# Patient Record
Sex: MALE | Race: WHITE | NOT HISPANIC OR LATINO | Employment: OTHER | URBAN - METROPOLITAN AREA
[De-identification: names, ages, dates, MRNs, and addresses within clinical notes are randomized per-mention and may not be internally consistent; named-entity substitution may affect disease eponyms.]

---

## 2017-01-04 ENCOUNTER — ALLSCRIPTS OFFICE VISIT (OUTPATIENT)
Dept: OTHER | Facility: OTHER | Age: 67
End: 2017-01-04

## 2017-01-25 ENCOUNTER — ALLSCRIPTS OFFICE VISIT (OUTPATIENT)
Dept: OTHER | Facility: OTHER | Age: 67
End: 2017-01-25

## 2017-02-15 ENCOUNTER — LAB (OUTPATIENT)
Dept: LAB | Facility: MEDICAL CENTER | Age: 67
End: 2017-02-15
Payer: COMMERCIAL

## 2017-02-15 DIAGNOSIS — C91.10 CHRONIC LYMPHOCYTIC LEUKEMIA OF B-CELL TYPE NOT HAVING ACHIEVED REMISSION (HCC): ICD-10-CM

## 2017-02-15 DIAGNOSIS — I10 ESSENTIAL (PRIMARY) HYPERTENSION: ICD-10-CM

## 2017-02-15 DIAGNOSIS — Z12.5 ENCOUNTER FOR SCREENING FOR MALIGNANT NEOPLASM OF PROSTATE: ICD-10-CM

## 2017-02-15 LAB
ALBUMIN SERPL BCP-MCNC: 4.1 G/DL (ref 3.5–5)
ALP SERPL-CCNC: 76 U/L (ref 46–116)
ALT SERPL W P-5'-P-CCNC: 28 U/L (ref 12–78)
ANION GAP SERPL CALCULATED.3IONS-SCNC: 8 MMOL/L (ref 4–13)
AST SERPL W P-5'-P-CCNC: 21 U/L (ref 5–45)
BASOPHILS # BLD AUTO: 0.02 THOUSANDS/ΜL (ref 0–0.1)
BASOPHILS NFR BLD AUTO: 0 % (ref 0–1)
BILIRUB SERPL-MCNC: 0.72 MG/DL (ref 0.2–1)
BUN SERPL-MCNC: 9 MG/DL (ref 5–25)
CALCIUM SERPL-MCNC: 8.6 MG/DL (ref 8.3–10.1)
CHLORIDE SERPL-SCNC: 100 MMOL/L (ref 100–108)
CHOLEST SERPL-MCNC: 141 MG/DL (ref 50–200)
CO2 SERPL-SCNC: 28 MMOL/L (ref 21–32)
CREAT SERPL-MCNC: 0.84 MG/DL (ref 0.6–1.3)
EOSINOPHIL # BLD AUTO: 0.14 THOUSAND/ΜL (ref 0–0.61)
EOSINOPHIL NFR BLD AUTO: 1 % (ref 0–6)
ERYTHROCYTE [DISTWIDTH] IN BLOOD BY AUTOMATED COUNT: 17.5 % (ref 11.6–15.1)
GFR SERPL CREATININE-BSD FRML MDRD: >60 ML/MIN/1.73SQ M
GLUCOSE SERPL-MCNC: 109 MG/DL (ref 65–140)
HCT VFR BLD AUTO: 28.7 % (ref 36.5–49.3)
HDLC SERPL-MCNC: 77 MG/DL (ref 40–60)
HGB BLD-MCNC: 9 G/DL (ref 12–17)
LDLC SERPL CALC-MCNC: 56 MG/DL (ref 0–100)
LYMPHOCYTES # BLD AUTO: 14.39 THOUSANDS/ΜL (ref 0.6–4.47)
LYMPHOCYTES NFR BLD AUTO: 84 % (ref 14–44)
MCH RBC QN AUTO: 25 PG (ref 26.8–34.3)
MCHC RBC AUTO-ENTMCNC: 31.4 G/DL (ref 31.4–37.4)
MCV RBC AUTO: 80 FL (ref 82–98)
MONOCYTES # BLD AUTO: 0.41 THOUSAND/ΜL (ref 0.17–1.22)
MONOCYTES NFR BLD AUTO: 2 % (ref 4–12)
NEUTROPHILS # BLD AUTO: 2.3 THOUSANDS/ΜL (ref 1.85–7.62)
NEUTS SEG NFR BLD AUTO: 13 % (ref 43–75)
NRBC BLD AUTO-RTO: 0 /100 WBCS
PLATELET # BLD AUTO: 181 THOUSANDS/UL (ref 149–390)
PMV BLD AUTO: 10.1 FL (ref 8.9–12.7)
POTASSIUM SERPL-SCNC: 4.3 MMOL/L (ref 3.5–5.3)
PROT SERPL-MCNC: 8.3 G/DL (ref 6.4–8.2)
PSA SERPL-MCNC: 0.2 NG/ML (ref 0–4)
RBC # BLD AUTO: 3.6 MILLION/UL (ref 3.88–5.62)
SODIUM SERPL-SCNC: 136 MMOL/L (ref 136–145)
TRIGL SERPL-MCNC: 38 MG/DL
WBC # BLD AUTO: 17.27 THOUSAND/UL (ref 4.31–10.16)

## 2017-02-15 PROCEDURE — 80061 LIPID PANEL: CPT

## 2017-02-15 PROCEDURE — 36415 COLL VENOUS BLD VENIPUNCTURE: CPT

## 2017-02-15 PROCEDURE — G0103 PSA SCREENING: HCPCS

## 2017-02-15 PROCEDURE — 80053 COMPREHEN METABOLIC PANEL: CPT

## 2017-02-15 PROCEDURE — 85025 COMPLETE CBC W/AUTO DIFF WBC: CPT

## 2017-02-16 ENCOUNTER — GENERIC CONVERSION - ENCOUNTER (OUTPATIENT)
Dept: OTHER | Facility: OTHER | Age: 67
End: 2017-02-16

## 2017-02-22 ENCOUNTER — ALLSCRIPTS OFFICE VISIT (OUTPATIENT)
Dept: OTHER | Facility: OTHER | Age: 67
End: 2017-02-22

## 2017-02-22 ENCOUNTER — TRANSCRIBE ORDERS (OUTPATIENT)
Dept: ADMINISTRATIVE | Facility: HOSPITAL | Age: 67
End: 2017-02-22

## 2017-02-22 DIAGNOSIS — R06.00 DYSPNEA, UNSPECIFIED TYPE: Primary | ICD-10-CM

## 2017-02-23 ENCOUNTER — LAB (OUTPATIENT)
Dept: LAB | Facility: MEDICAL CENTER | Age: 67
End: 2017-02-23
Payer: COMMERCIAL

## 2017-02-23 ENCOUNTER — ALLSCRIPTS OFFICE VISIT (OUTPATIENT)
Dept: OTHER | Facility: OTHER | Age: 67
End: 2017-02-23

## 2017-02-23 ENCOUNTER — LAB REQUISITION (OUTPATIENT)
Dept: LAB | Facility: HOSPITAL | Age: 67
End: 2017-02-23
Payer: COMMERCIAL

## 2017-02-23 ENCOUNTER — TRANSCRIBE ORDERS (OUTPATIENT)
Dept: ADMINISTRATIVE | Facility: HOSPITAL | Age: 67
End: 2017-02-23

## 2017-02-23 DIAGNOSIS — D64.9 ANEMIA: ICD-10-CM

## 2017-02-23 LAB
DAT POLY-SP REAG RBC QL: NEGATIVE
FERRITIN SERPL-MCNC: 17 NG/ML (ref 8–388)
FOLATE SERPL-MCNC: 11.2 NG/ML (ref 3.1–17.5)
LDH SERPL-CCNC: 168 U/L (ref 81–234)
RETICS # AUTO: NORMAL 10*3/UL (ref 14356–105094)
RETICS # CALC: 1.57 % (ref 0.37–1.87)

## 2017-02-23 PROCEDURE — 36415 COLL VENOUS BLD VENIPUNCTURE: CPT

## 2017-02-23 PROCEDURE — 86880 COOMBS TEST DIRECT: CPT | Performed by: INTERNAL MEDICINE

## 2017-02-23 PROCEDURE — 85045 AUTOMATED RETICULOCYTE COUNT: CPT

## 2017-02-23 PROCEDURE — 83918 ORGANIC ACIDS TOTAL QUANT: CPT

## 2017-02-23 PROCEDURE — 82746 ASSAY OF FOLIC ACID SERUM: CPT

## 2017-02-23 PROCEDURE — 83615 LACTATE (LD) (LDH) ENZYME: CPT

## 2017-02-23 PROCEDURE — 82728 ASSAY OF FERRITIN: CPT

## 2017-03-01 ENCOUNTER — HOSPITAL ENCOUNTER (OUTPATIENT)
Dept: NON INVASIVE DIAGNOSTICS | Facility: CLINIC | Age: 67
Discharge: HOME | End: 2017-03-01
Payer: COMMERCIAL

## 2017-03-01 ENCOUNTER — GENERIC CONVERSION - ENCOUNTER (OUTPATIENT)
Dept: OTHER | Facility: OTHER | Age: 67
End: 2017-03-01

## 2017-03-01 DIAGNOSIS — R06.00 DYSPNEA, UNSPECIFIED TYPE: ICD-10-CM

## 2017-03-01 LAB — METHYLMALONATE SERPL-SCNC: 184 NMOL/L (ref 0–378)

## 2017-03-01 PROCEDURE — 93350 STRESS TTE ONLY: CPT

## 2017-05-09 RX ORDER — SODIUM CHLORIDE 9 MG/ML
20 INJECTION, SOLUTION INTRAVENOUS CONTINUOUS
Status: DISCONTINUED | OUTPATIENT
Start: 2017-05-10 | End: 2017-05-13 | Stop reason: HOSPADM

## 2017-05-10 ENCOUNTER — HOSPITAL ENCOUNTER (OUTPATIENT)
Dept: INFUSION CENTER | Facility: CLINIC | Age: 67
Discharge: HOME | End: 2017-05-10
Payer: COMMERCIAL

## 2017-05-15 ENCOUNTER — ALLSCRIPTS OFFICE VISIT (OUTPATIENT)
Dept: OTHER | Facility: OTHER | Age: 67
End: 2017-05-15

## 2017-05-16 RX ORDER — SODIUM CHLORIDE 9 MG/ML
20 INJECTION, SOLUTION INTRAVENOUS CONTINUOUS
Status: DISCONTINUED | OUTPATIENT
Start: 2017-05-17 | End: 2017-05-20 | Stop reason: HOSPADM

## 2017-05-17 ENCOUNTER — HOSPITAL ENCOUNTER (OUTPATIENT)
Dept: INFUSION CENTER | Facility: CLINIC | Age: 67
Discharge: HOME | End: 2017-05-17
Payer: COMMERCIAL

## 2017-05-17 PROCEDURE — 96374 THER/PROPH/DIAG INJ IV PUSH: CPT

## 2017-05-17 RX ORDER — OMEPRAZOLE 40 MG/1
40 CAPSULE, DELAYED RELEASE ORAL DAILY
COMMUNITY
End: 2018-08-15 | Stop reason: SDUPTHER

## 2017-05-17 RX ORDER — ASPIRIN 81 MG/1
81 TABLET ORAL DAILY
COMMUNITY
End: 2017-10-02 | Stop reason: HOSPADM

## 2017-05-17 RX ORDER — TAMSULOSIN HYDROCHLORIDE 0.4 MG/1
0.4 CAPSULE ORAL
COMMUNITY
End: 2018-08-15 | Stop reason: SDUPTHER

## 2017-05-17 RX ORDER — OMEGA-3 FATTY ACIDS CAP DELAYED RELEASE 1000 MG 1000 MG
1 CAPSULE DELAYED RELEASE ORAL 2 TIMES DAILY
COMMUNITY
End: 2017-10-02 | Stop reason: HOSPADM

## 2017-05-17 RX ORDER — CARVEDILOL 12.5 MG/1
12.5 TABLET ORAL 2 TIMES DAILY WITH MEALS
COMMUNITY
End: 2018-04-06 | Stop reason: SDUPTHER

## 2017-05-17 RX ORDER — MINOXIDIL 10 MG/1
10 TABLET ORAL DAILY
COMMUNITY
End: 2018-09-25 | Stop reason: SDUPTHER

## 2017-05-17 RX ORDER — SIMVASTATIN 20 MG
20 TABLET ORAL
COMMUNITY
End: 2018-09-25 | Stop reason: SDUPTHER

## 2017-05-17 RX ORDER — LISINOPRIL 40 MG/1
40 TABLET ORAL DAILY
COMMUNITY
End: 2018-04-06 | Stop reason: ALTCHOICE

## 2017-05-17 RX ORDER — AMLODIPINE BESYLATE 5 MG/1
5 TABLET ORAL EVERY EVENING
COMMUNITY
End: 2018-08-15 | Stop reason: SDUPTHER

## 2017-05-17 RX ORDER — SERTRALINE HYDROCHLORIDE 100 MG/1
50 TABLET, FILM COATED ORAL DAILY
COMMUNITY
End: 2018-01-29 | Stop reason: SDUPTHER

## 2017-05-17 RX ADMIN — IRON SUCROSE 200 MG: 20 INJECTION, SOLUTION INTRAVENOUS at 09:29

## 2017-05-17 RX ADMIN — SODIUM CHLORIDE 20 ML/HR: 0.9 INJECTION, SOLUTION INTRAVENOUS at 09:29

## 2017-05-17 NOTE — PLAN OF CARE
Problem: Potential for Falls  Goal: Patient will remain free of falls  INTERVENTIONS:  - Assess patient frequently for physical needs  - Identify cognitive and physical deficits and behaviors that affect risk of falls    - [place] fall precautions as indicated by assessment   - Educate patient/family on patient safety including physical limitations  - Instruct patient to call for assistance with activity based on assessment  - Modify environment to reduce risk of injury  - Consider OT/PT consult to assist with strengthening/mobility   Outcome: Progressing

## 2017-05-19 ENCOUNTER — GENERIC CONVERSION - ENCOUNTER (OUTPATIENT)
Dept: OTHER | Facility: OTHER | Age: 67
End: 2017-05-19

## 2017-05-23 RX ORDER — SODIUM CHLORIDE 9 MG/ML
20 INJECTION, SOLUTION INTRAVENOUS CONTINUOUS
Status: DISCONTINUED | OUTPATIENT
Start: 2017-05-24 | End: 2017-05-27 | Stop reason: HOSPADM

## 2017-05-24 ENCOUNTER — HOSPITAL ENCOUNTER (OUTPATIENT)
Dept: INFUSION CENTER | Facility: CLINIC | Age: 67
Discharge: HOME | End: 2017-05-24
Payer: COMMERCIAL

## 2017-05-24 VITALS
HEART RATE: 54 BPM | RESPIRATION RATE: 18 BRPM | SYSTOLIC BLOOD PRESSURE: 138 MMHG | DIASTOLIC BLOOD PRESSURE: 78 MMHG | TEMPERATURE: 98.1 F

## 2017-05-24 PROCEDURE — 96374 THER/PROPH/DIAG INJ IV PUSH: CPT

## 2017-05-24 RX ADMIN — SODIUM CHLORIDE 20 ML/HR: 0.9 INJECTION, SOLUTION INTRAVENOUS at 09:49

## 2017-05-24 RX ADMIN — IRON SUCROSE 200 MG: 20 INJECTION, SOLUTION INTRAVENOUS at 09:49

## 2017-05-24 NOTE — PLAN OF CARE
Problem: Potential for Falls  Goal: Patient will remain free of falls  INTERVENTIONS:  - Assess patient frequently for physical needs  - Identify cognitive and physical deficits and behaviors that affect risk of falls    - [place] fall precautions as indicated by assessment   - Educate patient/family on patient safety including physical limitations  - Instruct patient to call for assistance with activity based on assessment  - Modify environment to reduce risk of injury  - Consider OT/PT consult to assist with strengthening/mobility   Outcome: Progressing

## 2017-05-30 RX ORDER — SODIUM CHLORIDE 9 MG/ML
20 INJECTION, SOLUTION INTRAVENOUS CONTINUOUS
Status: DISCONTINUED | OUTPATIENT
Start: 2017-05-31 | End: 2017-06-03 | Stop reason: HOSPADM

## 2017-05-31 ENCOUNTER — HOSPITAL ENCOUNTER (OUTPATIENT)
Dept: INFUSION CENTER | Facility: CLINIC | Age: 67
Discharge: HOME | End: 2017-05-31
Payer: COMMERCIAL

## 2017-05-31 VITALS
SYSTOLIC BLOOD PRESSURE: 139 MMHG | RESPIRATION RATE: 18 BRPM | DIASTOLIC BLOOD PRESSURE: 69 MMHG | TEMPERATURE: 97.6 F | HEART RATE: 57 BPM

## 2017-05-31 PROCEDURE — 96374 THER/PROPH/DIAG INJ IV PUSH: CPT

## 2017-05-31 RX ADMIN — IRON SUCROSE 200 MG: 20 INJECTION, SOLUTION INTRAVENOUS at 09:10

## 2017-05-31 RX ADMIN — SODIUM CHLORIDE 20 ML/HR: 0.9 INJECTION, SOLUTION INTRAVENOUS at 09:11

## 2017-05-31 NOTE — PROGRESS NOTES
Pt received IVP venofer dose 3/4 without complications  VSS upon discharge  Pt aware of next appointment  AVS provided

## 2017-05-31 NOTE — PLAN OF CARE
Problem: Potential for Falls  Goal: Patient will remain free of falls  INTERVENTIONS:  - Assess patient frequently for physical needs  - Identify cognitive and physical deficits and behaviors that affect risk of falls    - [place] fall precautions as indicated by assessment   - Educate patient/family on patient safety including physical limitations  - Instruct patient to call for assistance with activity based on assessment  - Modify environment to reduce risk of injury  - Consider OT/PT consult to assist with strengthening/mobility   Outcome: Progressing

## 2017-06-06 RX ORDER — SODIUM CHLORIDE 9 MG/ML
20 INJECTION, SOLUTION INTRAVENOUS CONTINUOUS
Status: DISCONTINUED | OUTPATIENT
Start: 2017-06-07 | End: 2017-06-10 | Stop reason: HOSPADM

## 2017-06-07 ENCOUNTER — HOSPITAL ENCOUNTER (OUTPATIENT)
Dept: INFUSION CENTER | Facility: CLINIC | Age: 67
Discharge: HOME | End: 2017-06-07
Payer: COMMERCIAL

## 2017-06-09 RX ORDER — SODIUM CHLORIDE 9 MG/ML
20 INJECTION, SOLUTION INTRAVENOUS CONTINUOUS
Status: DISCONTINUED | OUTPATIENT
Start: 2017-06-12 | End: 2017-06-15 | Stop reason: HOSPADM

## 2017-06-12 ENCOUNTER — HOSPITAL ENCOUNTER (OUTPATIENT)
Dept: INFUSION CENTER | Facility: CLINIC | Age: 67
Discharge: HOME | End: 2017-06-12
Payer: COMMERCIAL

## 2017-06-12 VITALS
DIASTOLIC BLOOD PRESSURE: 61 MMHG | SYSTOLIC BLOOD PRESSURE: 122 MMHG | HEART RATE: 57 BPM | TEMPERATURE: 98.8 F | RESPIRATION RATE: 20 BRPM

## 2017-06-12 PROCEDURE — 96374 THER/PROPH/DIAG INJ IV PUSH: CPT

## 2017-06-12 RX ADMIN — SODIUM CHLORIDE 20 ML/HR: 0.9 INJECTION, SOLUTION INTRAVENOUS at 08:16

## 2017-06-12 RX ADMIN — IRON SUCROSE 200 MG: 20 INJECTION, SOLUTION INTRAVENOUS at 08:17

## 2017-06-12 NOTE — PLAN OF CARE
Problem: Potential for Falls  Goal: Patient will remain free of falls  INTERVENTIONS:  - Assess patient frequently for physical needs  -  Identify cognitive and physical deficits and behaviors that affect risk of falls    -  [place] fall precautions as indicated by assessment   - Educate patient/family on patient safety including physical limitations  - Instruct patient to call for assistance with activity based on assessment  - Modify environment to reduce risk of injury  - Consider OT/PT consult to assist with strengthening/mobility   Outcome: Progressing

## 2017-07-17 ENCOUNTER — GENERIC CONVERSION - ENCOUNTER (OUTPATIENT)
Dept: OTHER | Facility: OTHER | Age: 67
End: 2017-07-17

## 2017-08-14 DIAGNOSIS — D64.9 ANEMIA: ICD-10-CM

## 2017-08-17 ENCOUNTER — LAB (OUTPATIENT)
Dept: LAB | Facility: CLINIC | Age: 67
End: 2017-08-17
Payer: COMMERCIAL

## 2017-08-17 ENCOUNTER — TRANSCRIBE ORDERS (OUTPATIENT)
Dept: LAB | Facility: CLINIC | Age: 67
End: 2017-08-17

## 2017-08-17 ENCOUNTER — TRANSCRIBE ORDERS (OUTPATIENT)
Dept: ADMINISTRATIVE | Facility: HOSPITAL | Age: 67
End: 2017-08-17

## 2017-08-17 ENCOUNTER — ALLSCRIPTS OFFICE VISIT (OUTPATIENT)
Dept: OTHER | Facility: OTHER | Age: 67
End: 2017-08-17

## 2017-08-17 DIAGNOSIS — D64.9 ANEMIA, UNSPECIFIED: Primary | ICD-10-CM

## 2017-08-17 DIAGNOSIS — I27.20 PHT (PULMONARY HYPERTENSION) (HCC): Primary | ICD-10-CM

## 2017-08-17 DIAGNOSIS — D64.9 ANEMIA: ICD-10-CM

## 2017-08-17 LAB
ALBUMIN SERPL BCP-MCNC: 4.1 G/DL (ref 3.5–5)
ALP SERPL-CCNC: 90 U/L (ref 46–116)
ALT SERPL W P-5'-P-CCNC: 37 U/L (ref 12–78)
ANION GAP SERPL CALCULATED.3IONS-SCNC: 6 MMOL/L (ref 4–13)
ANISOCYTOSIS BLD QL SMEAR: PRESENT
AST SERPL W P-5'-P-CCNC: 35 U/L (ref 5–45)
BASOPHILS # BLD MANUAL: 0 THOUSAND/UL (ref 0–0.1)
BASOPHILS NFR MAR MANUAL: 0 % (ref 0–1)
BILIRUB SERPL-MCNC: 0.45 MG/DL (ref 0.2–1)
BUN SERPL-MCNC: 7 MG/DL (ref 5–25)
CALCIUM SERPL-MCNC: 8.4 MG/DL (ref 8.3–10.1)
CHLORIDE SERPL-SCNC: 97 MMOL/L (ref 100–108)
CO2 SERPL-SCNC: 26 MMOL/L (ref 21–32)
CREAT SERPL-MCNC: 0.71 MG/DL (ref 0.6–1.3)
EOSINOPHIL # BLD MANUAL: 0.23 THOUSAND/UL (ref 0–0.4)
EOSINOPHIL NFR BLD MANUAL: 1 % (ref 0–6)
ERYTHROCYTE [DISTWIDTH] IN BLOOD BY AUTOMATED COUNT: 16.4 % (ref 11.6–15.1)
FERRITIN SERPL-MCNC: 22 NG/ML (ref 8–388)
GFR SERPL CREATININE-BSD FRML MDRD: 99 ML/MIN/1.73SQ M
GLUCOSE P FAST SERPL-MCNC: 96 MG/DL (ref 65–99)
HCT VFR BLD AUTO: 35.1 % (ref 36.5–49.3)
HGB BLD-MCNC: 11.8 G/DL (ref 12–17)
LYMPHOCYTES # BLD AUTO: 18.17 THOUSAND/UL (ref 0.6–4.47)
LYMPHOCYTES # BLD AUTO: 78 % (ref 14–44)
MCH RBC QN AUTO: 28.9 PG (ref 26.8–34.3)
MCHC RBC AUTO-ENTMCNC: 33.6 G/DL (ref 31.4–37.4)
MCV RBC AUTO: 86 FL (ref 82–98)
MONOCYTES # BLD AUTO: 1.4 THOUSAND/UL (ref 0–1.22)
MONOCYTES NFR BLD: 6 % (ref 4–12)
NEUTROPHILS # BLD MANUAL: 2.8 THOUSAND/UL (ref 1.85–7.62)
NEUTS SEG NFR BLD AUTO: 12 % (ref 43–75)
NRBC BLD AUTO-RTO: 0 /100 WBCS
PLATELET # BLD AUTO: 187 THOUSANDS/UL (ref 149–390)
PLATELET BLD QL SMEAR: ADEQUATE
PMV BLD AUTO: 9.9 FL (ref 8.9–12.7)
POTASSIUM SERPL-SCNC: 4.8 MMOL/L (ref 3.5–5.3)
PROT SERPL-MCNC: 8 G/DL (ref 6.4–8.2)
RBC # BLD AUTO: 4.08 MILLION/UL (ref 3.88–5.62)
SMUDGE CELLS BLD QL SMEAR: PRESENT
SODIUM SERPL-SCNC: 129 MMOL/L (ref 136–145)
TOTAL CELLS COUNTED SPEC: 100
VARIANT LYMPHS # BLD AUTO: 3 %
WBC # BLD AUTO: 23.3 THOUSAND/UL (ref 4.31–10.16)

## 2017-08-17 PROCEDURE — 82728 ASSAY OF FERRITIN: CPT

## 2017-08-17 PROCEDURE — 85007 BL SMEAR W/DIFF WBC COUNT: CPT

## 2017-08-17 PROCEDURE — 85027 COMPLETE CBC AUTOMATED: CPT

## 2017-08-17 PROCEDURE — 36415 COLL VENOUS BLD VENIPUNCTURE: CPT

## 2017-08-17 PROCEDURE — 80053 COMPREHEN METABOLIC PANEL: CPT

## 2017-08-24 ENCOUNTER — ALLSCRIPTS OFFICE VISIT (OUTPATIENT)
Dept: OTHER | Facility: OTHER | Age: 67
End: 2017-08-24

## 2017-08-29 ENCOUNTER — HOSPITAL ENCOUNTER (OUTPATIENT)
Dept: INFUSION CENTER | Facility: CLINIC | Age: 67
Discharge: HOME | End: 2017-08-29
Payer: COMMERCIAL

## 2017-08-29 VITALS
DIASTOLIC BLOOD PRESSURE: 80 MMHG | SYSTOLIC BLOOD PRESSURE: 164 MMHG | TEMPERATURE: 97 F | RESPIRATION RATE: 18 BRPM | HEART RATE: 59 BPM

## 2017-08-29 PROCEDURE — 96374 THER/PROPH/DIAG INJ IV PUSH: CPT

## 2017-08-29 RX ORDER — SODIUM CHLORIDE 9 MG/ML
20 INJECTION, SOLUTION INTRAVENOUS CONTINUOUS
Status: DISCONTINUED | OUTPATIENT
Start: 2017-08-29 | End: 2017-09-01 | Stop reason: HOSPADM

## 2017-08-29 RX ADMIN — SODIUM CHLORIDE 20 ML/HR: 0.9 INJECTION, SOLUTION INTRAVENOUS at 08:40

## 2017-08-29 RX ADMIN — IRON SUCROSE 200 MG: 20 INJECTION, SOLUTION INTRAVENOUS at 08:43

## 2017-08-29 NOTE — PLAN OF CARE
Problem: Potential for Falls  Goal: Patient will remain free of falls  INTERVENTIONS:  - Assess patient frequently for physical needs  -  Identify cognitive and physical deficits and behaviors that affect risk of falls    -  [place] fall precautions as indicated by assessment   - Educate patient/family on patient safety including physical limitations  - Instruct patient to call for assistance with activity based on assessment  - Modify environment to reduce risk of injury  - Consider OT/PT consult to assist with strengthening/mobility   Outcome: Progressing

## 2017-09-05 ENCOUNTER — GENERIC CONVERSION - ENCOUNTER (OUTPATIENT)
Dept: OTHER | Facility: OTHER | Age: 67
End: 2017-09-05

## 2017-09-05 ENCOUNTER — APPOINTMENT (OUTPATIENT)
Dept: PREADMISSION TESTING | Facility: HOSPITAL | Age: 67
End: 2017-09-05
Payer: MEDICARE

## 2017-09-05 ENCOUNTER — APPOINTMENT (OUTPATIENT)
Dept: LAB | Facility: HOSPITAL | Age: 67
End: 2017-09-05
Payer: MEDICARE

## 2017-09-05 ENCOUNTER — HOSPITAL ENCOUNTER (OUTPATIENT)
Dept: RADIOLOGY | Facility: HOSPITAL | Age: 67
Discharge: HOME | End: 2017-09-05
Attending: ORTHOPAEDIC SURGERY
Payer: MEDICARE

## 2017-09-05 ENCOUNTER — HOSPITAL ENCOUNTER (OUTPATIENT)
Dept: NON INVASIVE DIAGNOSTICS | Facility: HOSPITAL | Age: 67
Discharge: HOME | End: 2017-09-05
Attending: ORTHOPAEDIC SURGERY
Payer: MEDICARE

## 2017-09-05 ENCOUNTER — ANESTHESIA EVENT (OUTPATIENT)
Dept: PERIOP | Facility: HOSPITAL | Age: 67
DRG: 470 | End: 2017-09-05
Payer: MEDICARE

## 2017-09-05 ENCOUNTER — TRANSCRIBE ORDERS (OUTPATIENT)
Dept: ADMINISTRATIVE | Facility: HOSPITAL | Age: 67
End: 2017-09-05

## 2017-09-05 VITALS
DIASTOLIC BLOOD PRESSURE: 70 MMHG | HEIGHT: 68 IN | RESPIRATION RATE: 16 BRPM | WEIGHT: 180.8 LBS | HEART RATE: 59 BPM | BODY MASS INDEX: 27.4 KG/M2 | TEMPERATURE: 97.7 F | SYSTOLIC BLOOD PRESSURE: 150 MMHG

## 2017-09-05 DIAGNOSIS — Z01.818 PREOP EXAMINATION: ICD-10-CM

## 2017-09-05 DIAGNOSIS — Z01.818 PREOP EXAMINATION: Primary | ICD-10-CM

## 2017-09-05 DIAGNOSIS — Z12.5 ENCOUNTER FOR SCREENING FOR MALIGNANT NEOPLASM OF PROSTATE: ICD-10-CM

## 2017-09-05 LAB
ALBUMIN SERPL BCP-MCNC: 4.3 G/DL (ref 3.5–5)
ALP SERPL-CCNC: 84 U/L (ref 46–116)
ALT SERPL W P-5'-P-CCNC: 35 U/L (ref 12–78)
ANION GAP SERPL CALCULATED.3IONS-SCNC: 7 MMOL/L (ref 4–13)
AST SERPL W P-5'-P-CCNC: 36 U/L (ref 5–45)
ATRIAL RATE: 60 BPM
BACTERIA UR QL AUTO: ABNORMAL /HPF
BASOPHILS # BLD AUTO: 0.03 THOUSANDS/ΜL (ref 0–0.1)
BASOPHILS NFR BLD AUTO: 0 % (ref 0–1)
BILIRUB SERPL-MCNC: 0.62 MG/DL (ref 0.2–1)
BILIRUB UR QL STRIP: NEGATIVE
BUN SERPL-MCNC: 10 MG/DL (ref 5–25)
CALCIUM SERPL-MCNC: 8.9 MG/DL (ref 8.3–10.1)
CHLORIDE SERPL-SCNC: 97 MMOL/L (ref 100–108)
CLARITY UR: CLEAR
CO2 SERPL-SCNC: 30 MMOL/L (ref 21–32)
COLOR UR: ABNORMAL
CREAT SERPL-MCNC: 0.81 MG/DL (ref 0.6–1.3)
EOSINOPHIL # BLD AUTO: 0.11 THOUSAND/ΜL (ref 0–0.61)
EOSINOPHIL NFR BLD AUTO: 1 % (ref 0–6)
ERYTHROCYTE [DISTWIDTH] IN BLOOD BY AUTOMATED COUNT: 16.1 % (ref 11.6–15.1)
GFR SERPL CREATININE-BSD FRML MDRD: 94 ML/MIN/1.73SQ M
GLUCOSE P FAST SERPL-MCNC: 105 MG/DL (ref 65–99)
GLUCOSE UR STRIP-MCNC: NEGATIVE MG/DL
HCT VFR BLD AUTO: 34.7 % (ref 36.5–49.3)
HGB BLD-MCNC: 11.8 G/DL (ref 12–17)
HGB UR QL STRIP.AUTO: ABNORMAL
INR PPP: 1.19 (ref 0.86–1.16)
KETONES UR STRIP-MCNC: NEGATIVE MG/DL
LEUKOCYTE ESTERASE UR QL STRIP: NEGATIVE
LYMPHOCYTES # BLD AUTO: 16.71 THOUSANDS/ΜL (ref 0.6–4.47)
LYMPHOCYTES NFR BLD AUTO: 84 % (ref 14–44)
MCH RBC QN AUTO: 30.1 PG (ref 26.8–34.3)
MCHC RBC AUTO-ENTMCNC: 34 G/DL (ref 31.4–37.4)
MCV RBC AUTO: 89 FL (ref 82–98)
MONOCYTES # BLD AUTO: 0.19 THOUSAND/ΜL (ref 0.17–1.22)
MONOCYTES NFR BLD AUTO: 1 % (ref 4–12)
NEUTROPHILS # BLD AUTO: 2.74 THOUSANDS/ΜL (ref 1.85–7.62)
NEUTS SEG NFR BLD AUTO: 14 % (ref 43–75)
NITRITE UR QL STRIP: NEGATIVE
NON-SQ EPI CELLS URNS QL MICRO: ABNORMAL /HPF
NRBC BLD AUTO-RTO: 0 /100 WBCS
P AXIS: 50 DEGREES
PH UR STRIP.AUTO: 6 [PH] (ref 4.5–8)
PLATELET # BLD AUTO: 148 THOUSANDS/UL (ref 149–390)
PMV BLD AUTO: 9.5 FL (ref 8.9–12.7)
POTASSIUM SERPL-SCNC: 4.6 MMOL/L (ref 3.5–5.3)
PR INTERVAL: 186 MS
PROT SERPL-MCNC: 8.2 G/DL (ref 6.4–8.2)
PROT UR STRIP-MCNC: NEGATIVE MG/DL
PROTHROMBIN TIME: 15.2 SECONDS (ref 12.1–14.4)
QRS AXIS: 70 DEGREES
QRSD INTERVAL: 92 MS
QT INTERVAL: 488 MS
QTC INTERVAL: 488 MS
RBC # BLD AUTO: 3.92 MILLION/UL (ref 3.88–5.62)
RBC #/AREA URNS AUTO: ABNORMAL /HPF
SODIUM SERPL-SCNC: 134 MMOL/L (ref 136–145)
SP GR UR STRIP.AUTO: <=1.005 (ref 1–1.03)
T WAVE AXIS: 77 DEGREES
UROBILINOGEN UR QL STRIP.AUTO: 0.2 E.U./DL
VENTRICULAR RATE: 60 BPM
WBC # BLD AUTO: 19.78 THOUSAND/UL (ref 4.31–10.16)
WBC #/AREA URNS AUTO: ABNORMAL /HPF

## 2017-09-05 PROCEDURE — 36415 COLL VENOUS BLD VENIPUNCTURE: CPT

## 2017-09-05 PROCEDURE — 85610 PROTHROMBIN TIME: CPT

## 2017-09-05 PROCEDURE — 85025 COMPLETE CBC W/AUTO DIFF WBC: CPT

## 2017-09-05 PROCEDURE — 81001 URINALYSIS AUTO W/SCOPE: CPT | Performed by: ORTHOPAEDIC SURGERY

## 2017-09-05 PROCEDURE — 93005 ELECTROCARDIOGRAM TRACING: CPT

## 2017-09-05 PROCEDURE — 71020 HB CHEST X-RAY 2VW FRONTAL&LATL: CPT

## 2017-09-05 PROCEDURE — 80053 COMPREHEN METABOLIC PANEL: CPT

## 2017-09-05 RX ORDER — NAPROXEN 500 MG/1
500 TABLET ORAL 2 TIMES DAILY PRN
COMMUNITY
End: 2017-10-02 | Stop reason: HOSPADM

## 2017-09-05 RX ORDER — SODIUM CHLORIDE 9 MG/ML
125 INJECTION, SOLUTION INTRAVENOUS CONTINUOUS
Status: CANCELLED | OUTPATIENT
Start: 2017-09-29

## 2017-09-06 RX ORDER — SODIUM CHLORIDE 9 MG/ML
20 INJECTION, SOLUTION INTRAVENOUS CONTINUOUS
Status: DISCONTINUED | OUTPATIENT
Start: 2017-09-07 | End: 2017-09-10 | Stop reason: HOSPADM

## 2017-09-07 ENCOUNTER — HOSPITAL ENCOUNTER (OUTPATIENT)
Dept: INFUSION CENTER | Facility: CLINIC | Age: 67
Discharge: HOME | End: 2017-09-07
Payer: MEDICARE

## 2017-09-07 VITALS
HEART RATE: 62 BPM | DIASTOLIC BLOOD PRESSURE: 79 MMHG | RESPIRATION RATE: 18 BRPM | SYSTOLIC BLOOD PRESSURE: 144 MMHG | TEMPERATURE: 98.4 F

## 2017-09-07 PROCEDURE — 96374 THER/PROPH/DIAG INJ IV PUSH: CPT

## 2017-09-07 RX ADMIN — SODIUM CHLORIDE 20 ML/HR: 0.9 INJECTION, SOLUTION INTRAVENOUS at 09:03

## 2017-09-07 RX ADMIN — IRON SUCROSE 200 MG: 20 INJECTION, SOLUTION INTRAVENOUS at 09:03

## 2017-09-07 NOTE — PROGRESS NOTES
Pt received venofer without complications  No further infusion appointments scheduled  AVS provided

## 2017-09-07 NOTE — PLAN OF CARE
Problem: Potential for Falls  Goal: Patient will remain free of falls  INTERVENTIONS:  - Assess patient frequently for physical needs  -  Identify cognitive and physical deficits and behaviors that affect risk of falls    -  [place] fall precautions as indicated by assessment   - Educate patient/family on patient safety including physical limitations  - Instruct patient to call for assistance with activity based on assessment  - Modify environment to reduce risk of injury  - Consider OT/PT consult to assist with strengthening/mobility   Outcome: Progressing

## 2017-09-15 ENCOUNTER — ALLSCRIPTS OFFICE VISIT (OUTPATIENT)
Dept: OTHER | Facility: OTHER | Age: 67
End: 2017-09-15

## 2017-09-18 ENCOUNTER — HOSPITAL ENCOUNTER (OUTPATIENT)
Dept: NON INVASIVE DIAGNOSTICS | Facility: CLINIC | Age: 67
Discharge: HOME | End: 2017-09-18
Payer: MEDICARE

## 2017-09-18 DIAGNOSIS — I27.20 PHT (PULMONARY HYPERTENSION) (HCC): ICD-10-CM

## 2017-09-18 PROCEDURE — 93306 TTE W/DOPPLER COMPLETE: CPT

## 2017-09-25 NOTE — PRE-PROCEDURE INSTRUCTIONS
No outpatient prescriptions have been marked as taking for the 9/29/17 encounter [facility] Encounter)

## 2017-09-27 ENCOUNTER — ALLSCRIPTS OFFICE VISIT (OUTPATIENT)
Dept: OTHER | Facility: OTHER | Age: 67
End: 2017-09-27

## 2017-09-29 ENCOUNTER — APPOINTMENT (OUTPATIENT)
Dept: RADIOLOGY | Facility: HOSPITAL | Age: 67
DRG: 470 | End: 2017-09-29
Payer: MEDICARE

## 2017-09-29 ENCOUNTER — ANESTHESIA (OUTPATIENT)
Dept: PERIOP | Facility: HOSPITAL | Age: 67
DRG: 470 | End: 2017-09-29
Payer: MEDICARE

## 2017-09-29 ENCOUNTER — APPOINTMENT (INPATIENT)
Dept: RADIOLOGY | Facility: HOSPITAL | Age: 67
DRG: 470 | End: 2017-09-29
Payer: MEDICARE

## 2017-09-29 ENCOUNTER — HOSPITAL ENCOUNTER (INPATIENT)
Facility: HOSPITAL | Age: 67
LOS: 3 days | Discharge: HOME HEALTH | DRG: 470 | End: 2017-10-02
Attending: ORTHOPAEDIC SURGERY | Admitting: ORTHOPAEDIC SURGERY
Payer: MEDICARE

## 2017-09-29 DIAGNOSIS — M16.11 PRIMARY OSTEOARTHRITIS OF RIGHT HIP: ICD-10-CM

## 2017-09-29 PROBLEM — C91.10 CLL (CHRONIC LYMPHOCYTIC LEUKEMIA) (HCC): Chronic | Status: ACTIVE | Noted: 2017-09-29

## 2017-09-29 PROBLEM — I10 HYPERTENSION: Chronic | Status: ACTIVE | Noted: 2017-09-29

## 2017-09-29 LAB
ABO GROUP BLD: NORMAL
BLD GP AB SCN SERPL QL: NEGATIVE
RH BLD: POSITIVE
SPECIMEN EXPIRATION DATE: NORMAL

## 2017-09-29 PROCEDURE — C1776 JOINT DEVICE (IMPLANTABLE): HCPCS | Performed by: ORTHOPAEDIC SURGERY

## 2017-09-29 PROCEDURE — 97116 GAIT TRAINING THERAPY: CPT | Performed by: PHYSICAL THERAPIST

## 2017-09-29 PROCEDURE — 86900 BLOOD TYPING SEROLOGIC ABO: CPT | Performed by: ORTHOPAEDIC SURGERY

## 2017-09-29 PROCEDURE — G8979 MOBILITY GOAL STATUS: HCPCS | Performed by: PHYSICAL THERAPIST

## 2017-09-29 PROCEDURE — 94762 N-INVAS EAR/PLS OXIMTRY CONT: CPT

## 2017-09-29 PROCEDURE — 73501 X-RAY EXAM HIP UNI 1 VIEW: CPT

## 2017-09-29 PROCEDURE — 0SR904Z REPLACEMENT OF RIGHT HIP JOINT WITH CERAMIC ON POLYETHYLENE SYNTHETIC SUBSTITUTE, OPEN APPROACH: ICD-10-PCS | Performed by: ORTHOPAEDIC SURGERY

## 2017-09-29 PROCEDURE — 97163 PT EVAL HIGH COMPLEX 45 MIN: CPT | Performed by: PHYSICAL THERAPIST

## 2017-09-29 PROCEDURE — G8978 MOBILITY CURRENT STATUS: HCPCS | Performed by: PHYSICAL THERAPIST

## 2017-09-29 PROCEDURE — 86901 BLOOD TYPING SEROLOGIC RH(D): CPT | Performed by: ORTHOPAEDIC SURGERY

## 2017-09-29 PROCEDURE — 86850 RBC ANTIBODY SCREEN: CPT | Performed by: ORTHOPAEDIC SURGERY

## 2017-09-29 PROCEDURE — C1713 ANCHOR/SCREW BN/BN,TIS/BN: HCPCS | Performed by: ORTHOPAEDIC SURGERY

## 2017-09-29 DEVICE — PINNACLE CANCELLOUS BONE SCREW 6.5MM X 20MM
Type: IMPLANTABLE DEVICE | Site: HIP | Status: FUNCTIONAL
Brand: PINNACLE

## 2017-09-29 DEVICE — PINNACLE POROCOAT ACETABULAR SHELL SECTOR II 52MM OD
Type: IMPLANTABLE DEVICE | Site: HIP | Status: FUNCTIONAL
Brand: PINNACLE POROCOAT

## 2017-09-29 DEVICE — PINNACLE HIP SOLUTIONS ALTRX POLYETHYLENE ACETABULAR LINER NEUTRAL 36MM ID 52MM OD
Type: IMPLANTABLE DEVICE | Site: HIP | Status: FUNCTIONAL
Brand: PINNACLE ALTRX

## 2017-09-29 DEVICE — CORAIL HIP SYSTEM CEMENTLESS FEMORAL STEM 12/14 AMT 135 DEGREES KA SIZE 12 HA COATED STANDARD COLLAR
Type: IMPLANTABLE DEVICE | Site: HIP | Status: FUNCTIONAL
Brand: CORAIL

## 2017-09-29 DEVICE — BIOLOX DELTA CERAMIC FEMORAL HEAD +1.5 36MM DIA 12/14 TAPER
Type: IMPLANTABLE DEVICE | Site: HIP | Status: FUNCTIONAL
Brand: BIOLOX DELTA

## 2017-09-29 RX ORDER — PRAVASTATIN SODIUM 20 MG
10 TABLET ORAL
Status: DISCONTINUED | OUTPATIENT
Start: 2017-09-30 | End: 2017-10-02 | Stop reason: HOSPADM

## 2017-09-29 RX ORDER — ONDANSETRON 2 MG/ML
4 INJECTION INTRAMUSCULAR; INTRAVENOUS EVERY 6 HOURS PRN
Status: DISCONTINUED | OUTPATIENT
Start: 2017-09-30 | End: 2017-10-02 | Stop reason: HOSPADM

## 2017-09-29 RX ORDER — ASPIRIN 81 MG/1
81 TABLET, CHEWABLE ORAL 2 TIMES DAILY
Status: DISCONTINUED | OUTPATIENT
Start: 2017-09-29 | End: 2017-10-02 | Stop reason: HOSPADM

## 2017-09-29 RX ORDER — PROPOFOL 10 MG/ML
INJECTION, EMULSION INTRAVENOUS AS NEEDED
Status: DISCONTINUED | OUTPATIENT
Start: 2017-09-29 | End: 2017-09-29 | Stop reason: SURG

## 2017-09-29 RX ORDER — FENTANYL CITRATE 50 UG/ML
INJECTION, SOLUTION INTRAMUSCULAR; INTRAVENOUS AS NEEDED
Status: DISCONTINUED | OUTPATIENT
Start: 2017-09-29 | End: 2017-09-29 | Stop reason: SURG

## 2017-09-29 RX ORDER — ONDANSETRON 2 MG/ML
INJECTION INTRAMUSCULAR; INTRAVENOUS AS NEEDED
Status: DISCONTINUED | OUTPATIENT
Start: 2017-09-29 | End: 2017-09-29 | Stop reason: SURG

## 2017-09-29 RX ORDER — AMLODIPINE BESYLATE 5 MG/1
5 TABLET ORAL EVERY EVENING
Status: DISCONTINUED | OUTPATIENT
Start: 2017-09-29 | End: 2017-10-02 | Stop reason: HOSPADM

## 2017-09-29 RX ORDER — OXYCODONE HYDROCHLORIDE 5 MG/1
5 TABLET ORAL EVERY 4 HOURS PRN
Status: DISCONTINUED | OUTPATIENT
Start: 2017-09-30 | End: 2017-10-02 | Stop reason: HOSPADM

## 2017-09-29 RX ORDER — PANTOPRAZOLE SODIUM 40 MG/1
40 TABLET, DELAYED RELEASE ORAL
Status: DISCONTINUED | OUTPATIENT
Start: 2017-09-29 | End: 2017-10-02 | Stop reason: HOSPADM

## 2017-09-29 RX ORDER — NALBUPHINE HCL 10 MG/ML
2 AMPUL (ML) INJECTION ONCE
Status: COMPLETED | OUTPATIENT
Start: 2017-09-29 | End: 2017-09-29

## 2017-09-29 RX ORDER — ACETAMINOPHEN 325 MG/1
650 TABLET ORAL EVERY 6 HOURS PRN
Status: DISCONTINUED | OUTPATIENT
Start: 2017-09-30 | End: 2017-10-02 | Stop reason: HOSPADM

## 2017-09-29 RX ORDER — BUPIVACAINE HYDROCHLORIDE 7.5 MG/ML
INJECTION, SOLUTION INTRASPINAL AS NEEDED
Status: DISCONTINUED | OUTPATIENT
Start: 2017-09-29 | End: 2017-09-29 | Stop reason: SURG

## 2017-09-29 RX ORDER — FENTANYL CITRATE/PF 50 MCG/ML
25 SYRINGE (ML) INJECTION
Status: DISCONTINUED | OUTPATIENT
Start: 2017-09-29 | End: 2017-09-29 | Stop reason: HOSPADM

## 2017-09-29 RX ORDER — MINOXIDIL 10 MG/1
10 TABLET ORAL DAILY
Status: DISCONTINUED | OUTPATIENT
Start: 2017-09-29 | End: 2017-10-02 | Stop reason: HOSPADM

## 2017-09-29 RX ORDER — SODIUM CHLORIDE, SODIUM LACTATE, POTASSIUM CHLORIDE, CALCIUM CHLORIDE 600; 310; 30; 20 MG/100ML; MG/100ML; MG/100ML; MG/100ML
125 INJECTION, SOLUTION INTRAVENOUS CONTINUOUS
Status: DISCONTINUED | OUTPATIENT
Start: 2017-09-29 | End: 2017-10-02 | Stop reason: HOSPADM

## 2017-09-29 RX ORDER — METOCLOPRAMIDE HYDROCHLORIDE 5 MG/ML
5 INJECTION INTRAMUSCULAR; INTRAVENOUS EVERY 6 HOURS PRN
Status: ACTIVE | OUTPATIENT
Start: 2017-09-29 | End: 2017-09-30

## 2017-09-29 RX ORDER — PANTOPRAZOLE SODIUM 40 MG/1
40 TABLET, DELAYED RELEASE ORAL
Status: DISCONTINUED | OUTPATIENT
Start: 2017-09-29 | End: 2017-09-29 | Stop reason: SDUPTHER

## 2017-09-29 RX ORDER — MORPHINE SULFATE 0.5 MG/ML
INJECTION, SOLUTION EPIDURAL; INTRATHECAL; INTRAVENOUS AS NEEDED
Status: DISCONTINUED | OUTPATIENT
Start: 2017-09-29 | End: 2017-09-29 | Stop reason: SURG

## 2017-09-29 RX ORDER — LISINOPRIL 20 MG/1
40 TABLET ORAL DAILY
Status: DISCONTINUED | OUTPATIENT
Start: 2017-09-29 | End: 2017-10-02 | Stop reason: HOSPADM

## 2017-09-29 RX ORDER — KETOROLAC TROMETHAMINE 30 MG/ML
15 INJECTION, SOLUTION INTRAMUSCULAR; INTRAVENOUS EVERY 6 HOURS PRN
Status: DISPENSED | OUTPATIENT
Start: 2017-09-29 | End: 2017-09-30

## 2017-09-29 RX ORDER — ZOLPIDEM TARTRATE 5 MG/1
5 TABLET ORAL
COMMUNITY
End: 2018-08-15

## 2017-09-29 RX ORDER — ONDANSETRON 2 MG/ML
4 INJECTION INTRAMUSCULAR; INTRAVENOUS EVERY 4 HOURS PRN
Status: ACTIVE | OUTPATIENT
Start: 2017-09-29 | End: 2017-09-30

## 2017-09-29 RX ORDER — SENNOSIDES 8.6 MG
1 TABLET ORAL DAILY
Qty: 120 EACH | Refills: 0 | Status: SHIPPED | OUTPATIENT
Start: 2017-09-30 | End: 2019-04-10 | Stop reason: ALTCHOICE

## 2017-09-29 RX ORDER — TRAMADOL HYDROCHLORIDE 50 MG/1
50 TABLET ORAL EVERY 6 HOURS PRN
Status: DISCONTINUED | OUTPATIENT
Start: 2017-09-30 | End: 2017-10-02 | Stop reason: HOSPADM

## 2017-09-29 RX ORDER — CARVEDILOL 12.5 MG/1
12.5 TABLET ORAL 2 TIMES DAILY WITH MEALS
Status: DISCONTINUED | OUTPATIENT
Start: 2017-09-29 | End: 2017-10-02 | Stop reason: HOSPADM

## 2017-09-29 RX ORDER — NALOXONE HYDROCHLORIDE 0.4 MG/ML
0.1 INJECTION, SOLUTION INTRAMUSCULAR; INTRAVENOUS; SUBCUTANEOUS
Status: ACTIVE | OUTPATIENT
Start: 2017-09-29 | End: 2017-09-30

## 2017-09-29 RX ORDER — EPHEDRINE SULFATE 50 MG/ML
INJECTION, SOLUTION INTRAVENOUS AS NEEDED
Status: DISCONTINUED | OUTPATIENT
Start: 2017-09-29 | End: 2017-09-29 | Stop reason: SURG

## 2017-09-29 RX ORDER — SENNOSIDES 8.6 MG
1 TABLET ORAL DAILY
Status: DISCONTINUED | OUTPATIENT
Start: 2017-09-29 | End: 2017-10-02 | Stop reason: HOSPADM

## 2017-09-29 RX ORDER — CALCIUM CARBONATE 200(500)MG
1000 TABLET,CHEWABLE ORAL DAILY PRN
Status: DISCONTINUED | OUTPATIENT
Start: 2017-09-29 | End: 2017-10-02 | Stop reason: HOSPADM

## 2017-09-29 RX ORDER — OXYCODONE HYDROCHLORIDE 5 MG/1
5-10 TABLET ORAL EVERY 4 HOURS PRN
Qty: 60 TABLET | Refills: 0 | Status: SHIPPED | OUTPATIENT
Start: 2017-09-30 | End: 2017-10-10

## 2017-09-29 RX ORDER — ONDANSETRON 2 MG/ML
4 INJECTION INTRAMUSCULAR; INTRAVENOUS ONCE AS NEEDED
Status: DISCONTINUED | OUTPATIENT
Start: 2017-09-29 | End: 2017-09-29 | Stop reason: HOSPADM

## 2017-09-29 RX ORDER — NALBUPHINE HCL 10 MG/ML
2 AMPUL (ML) INJECTION
Status: DISPENSED | OUTPATIENT
Start: 2017-09-29 | End: 2017-09-30

## 2017-09-29 RX ORDER — OXYCODONE HYDROCHLORIDE AND ACETAMINOPHEN 5; 325 MG/1; MG/1
2 TABLET ORAL EVERY 6 HOURS PRN
Status: DISCONTINUED | OUTPATIENT
Start: 2017-09-29 | End: 2017-10-02 | Stop reason: HOSPADM

## 2017-09-29 RX ORDER — VANCOMYCIN HYDROCHLORIDE 1 G/200ML
1000 INJECTION, SOLUTION INTRAVENOUS ONCE
Status: COMPLETED | OUTPATIENT
Start: 2017-09-29 | End: 2017-09-29

## 2017-09-29 RX ORDER — PROPOFOL 10 MG/ML
INJECTION, EMULSION INTRAVENOUS CONTINUOUS PRN
Status: DISCONTINUED | OUTPATIENT
Start: 2017-09-29 | End: 2017-09-29 | Stop reason: SURG

## 2017-09-29 RX ORDER — TAMSULOSIN HYDROCHLORIDE 0.4 MG/1
0.4 CAPSULE ORAL
Status: DISCONTINUED | OUTPATIENT
Start: 2017-09-29 | End: 2017-10-02 | Stop reason: HOSPADM

## 2017-09-29 RX ORDER — MIDAZOLAM HYDROCHLORIDE 1 MG/ML
INJECTION INTRAMUSCULAR; INTRAVENOUS AS NEEDED
Status: DISCONTINUED | OUTPATIENT
Start: 2017-09-29 | End: 2017-09-29 | Stop reason: SURG

## 2017-09-29 RX ORDER — MAGNESIUM HYDROXIDE 1200 MG/15ML
LIQUID ORAL AS NEEDED
Status: DISCONTINUED | OUTPATIENT
Start: 2017-09-29 | End: 2017-09-29 | Stop reason: HOSPADM

## 2017-09-29 RX ORDER — SODIUM CHLORIDE 9 MG/ML
INJECTION, SOLUTION INTRAVENOUS AS NEEDED
Status: DISCONTINUED | OUTPATIENT
Start: 2017-09-29 | End: 2017-09-29 | Stop reason: HOSPADM

## 2017-09-29 RX ORDER — TRANEXAMIC ACID 100 MG/ML
INJECTION, SOLUTION INTRAVENOUS AS NEEDED
Status: DISCONTINUED | OUTPATIENT
Start: 2017-09-29 | End: 2017-09-29 | Stop reason: SURG

## 2017-09-29 RX ORDER — SODIUM CHLORIDE 9 MG/ML
125 INJECTION, SOLUTION INTRAVENOUS CONTINUOUS
Status: DISCONTINUED | OUTPATIENT
Start: 2017-09-29 | End: 2017-10-02 | Stop reason: HOSPADM

## 2017-09-29 RX ORDER — OXYCODONE HYDROCHLORIDE 10 MG/1
10 TABLET ORAL EVERY 4 HOURS PRN
Status: DISCONTINUED | OUTPATIENT
Start: 2017-09-30 | End: 2017-10-02 | Stop reason: HOSPADM

## 2017-09-29 RX ORDER — DIPHENHYDRAMINE HYDROCHLORIDE 50 MG/ML
25 INJECTION INTRAMUSCULAR; INTRAVENOUS EVERY 6 HOURS PRN
Status: DISCONTINUED | OUTPATIENT
Start: 2017-09-29 | End: 2017-09-30

## 2017-09-29 RX ADMIN — NALBUPHINE HYDROCHLORIDE 2 MG: 10 INJECTION, SOLUTION INTRAMUSCULAR; INTRAVENOUS; SUBCUTANEOUS at 15:16

## 2017-09-29 RX ADMIN — AMLODIPINE BESYLATE 5 MG: 5 TABLET ORAL at 18:15

## 2017-09-29 RX ADMIN — EPHEDRINE SULFATE 10 MG: 50 INJECTION, SOLUTION INTRAMUSCULAR; INTRAVENOUS; SUBCUTANEOUS at 08:09

## 2017-09-29 RX ADMIN — NALBUPHINE HYDROCHLORIDE 2 MG: 10 INJECTION, SOLUTION INTRAMUSCULAR; INTRAVENOUS; SUBCUTANEOUS at 18:15

## 2017-09-29 RX ADMIN — PROPOFOL 140 MCG/KG/MIN: 10 INJECTION, EMULSION INTRAVENOUS at 07:38

## 2017-09-29 RX ADMIN — ONDANSETRON HYDROCHLORIDE 4 MG: 2 INJECTION, SOLUTION INTRAVENOUS at 08:41

## 2017-09-29 RX ADMIN — PROPOFOL 20 MG: 10 INJECTION, EMULSION INTRAVENOUS at 07:36

## 2017-09-29 RX ADMIN — EPHEDRINE SULFATE 5 MG: 50 INJECTION, SOLUTION INTRAMUSCULAR; INTRAVENOUS; SUBCUTANEOUS at 08:00

## 2017-09-29 RX ADMIN — EPHEDRINE SULFATE 10 MG: 50 INJECTION, SOLUTION INTRAMUSCULAR; INTRAVENOUS; SUBCUTANEOUS at 08:11

## 2017-09-29 RX ADMIN — SODIUM CHLORIDE, SODIUM LACTATE, POTASSIUM CHLORIDE, AND CALCIUM CHLORIDE 125 ML/HR: .6; .31; .03; .02 INJECTION, SOLUTION INTRAVENOUS at 10:14

## 2017-09-29 RX ADMIN — TAMSULOSIN HYDROCHLORIDE 0.4 MG: 0.4 CAPSULE ORAL at 16:35

## 2017-09-29 RX ADMIN — SODIUM CHLORIDE: 0.9 INJECTION, SOLUTION INTRAVENOUS at 09:19

## 2017-09-29 RX ADMIN — DIPHENHYDRAMINE HYDROCHLORIDE 25 MG: 50 INJECTION, SOLUTION INTRAMUSCULAR; INTRAVENOUS at 11:42

## 2017-09-29 RX ADMIN — CEFAZOLIN SODIUM 2000 MG: 2 SOLUTION INTRAVENOUS at 07:34

## 2017-09-29 RX ADMIN — PANTOPRAZOLE SODIUM 40 MG: 40 TABLET, DELAYED RELEASE ORAL at 11:41

## 2017-09-29 RX ADMIN — NALBUPHINE HYDROCHLORIDE 2 MG: 10 INJECTION, SOLUTION INTRAMUSCULAR; INTRAVENOUS; SUBCUTANEOUS at 21:04

## 2017-09-29 RX ADMIN — PANTOPRAZOLE SODIUM 40 MG: 40 TABLET, DELAYED RELEASE ORAL at 15:16

## 2017-09-29 RX ADMIN — EPHEDRINE SULFATE 5 MG: 50 INJECTION, SOLUTION INTRAMUSCULAR; INTRAVENOUS; SUBCUTANEOUS at 08:03

## 2017-09-29 RX ADMIN — ASPIRIN 81 MG 81 MG: 81 TABLET ORAL at 11:41

## 2017-09-29 RX ADMIN — PROPOFOL 30 MG: 10 INJECTION, EMULSION INTRAVENOUS at 07:38

## 2017-09-29 RX ADMIN — LISINOPRIL 40 MG: 20 TABLET ORAL at 13:11

## 2017-09-29 RX ADMIN — EPHEDRINE SULFATE 5 MG: 50 INJECTION, SOLUTION INTRAMUSCULAR; INTRAVENOUS; SUBCUTANEOUS at 08:05

## 2017-09-29 RX ADMIN — PROPOFOL 30 MG: 10 INJECTION, EMULSION INTRAVENOUS at 07:45

## 2017-09-29 RX ADMIN — CEFAZOLIN SODIUM 1000 MG: 1 SOLUTION INTRAVENOUS at 22:41

## 2017-09-29 RX ADMIN — CEFAZOLIN SODIUM 1000 MG: 1 SOLUTION INTRAVENOUS at 15:15

## 2017-09-29 RX ADMIN — EPHEDRINE SULFATE 15 MG: 50 INJECTION, SOLUTION INTRAMUSCULAR; INTRAVENOUS; SUBCUTANEOUS at 08:16

## 2017-09-29 RX ADMIN — SODIUM CHLORIDE, SODIUM LACTATE, POTASSIUM CHLORIDE, AND CALCIUM CHLORIDE 125 ML/HR: .6; .31; .03; .02 INJECTION, SOLUTION INTRAVENOUS at 19:29

## 2017-09-29 RX ADMIN — EPHEDRINE SULFATE 10 MG: 50 INJECTION, SOLUTION INTRAMUSCULAR; INTRAVENOUS; SUBCUTANEOUS at 08:14

## 2017-09-29 RX ADMIN — MIDAZOLAM HYDROCHLORIDE 2 MG: 1 INJECTION, SOLUTION INTRAMUSCULAR; INTRAVENOUS at 07:34

## 2017-09-29 RX ADMIN — MINOXIDIL 10 MG: 10 TABLET ORAL at 13:11

## 2017-09-29 RX ADMIN — EPHEDRINE SULFATE 5 MG: 50 INJECTION, SOLUTION INTRAMUSCULAR; INTRAVENOUS; SUBCUTANEOUS at 08:07

## 2017-09-29 RX ADMIN — SODIUM CHLORIDE: 0.9 INJECTION, SOLUTION INTRAVENOUS at 07:45

## 2017-09-29 RX ADMIN — SENNOSIDES 8.6 MG: 8.6 TABLET, FILM COATED ORAL at 11:41

## 2017-09-29 RX ADMIN — OXYCODONE HYDROCHLORIDE AND ACETAMINOPHEN 2 TABLET: 5; 325 TABLET ORAL at 19:56

## 2017-09-29 RX ADMIN — KETOROLAC TROMETHAMINE 15 MG: 30 INJECTION, SOLUTION INTRAMUSCULAR at 21:09

## 2017-09-29 RX ADMIN — TRANEXAMIC ACID 1000 MG: 100 INJECTION, SOLUTION INTRAVENOUS at 08:41

## 2017-09-29 RX ADMIN — BUPIVACAINE HYDROCHLORIDE IN DEXTROSE 1.4 ML: 7.5 INJECTION, SOLUTION SUBARACHNOID at 07:35

## 2017-09-29 RX ADMIN — FENTANYL CITRATE 100 MCG: 50 INJECTION, SOLUTION INTRAMUSCULAR; INTRAVENOUS at 07:35

## 2017-09-29 RX ADMIN — KETOROLAC TROMETHAMINE 15 MG: 30 INJECTION, SOLUTION INTRAMUSCULAR at 15:15

## 2017-09-29 RX ADMIN — Medication 1 TABLET: at 11:41

## 2017-09-29 RX ADMIN — ASPIRIN 81 MG 81 MG: 81 TABLET ORAL at 18:15

## 2017-09-29 RX ADMIN — VANCOMYCIN HYDROCHLORIDE 1000 MG: 1 INJECTION, SOLUTION INTRAVENOUS at 07:52

## 2017-09-29 RX ADMIN — CARVEDILOL 12.5 MG: 12.5 TABLET, FILM COATED ORAL at 16:35

## 2017-09-29 RX ADMIN — DIPHENHYDRAMINE HYDROCHLORIDE 25 MG: 50 INJECTION, SOLUTION INTRAMUSCULAR; INTRAVENOUS at 19:56

## 2017-09-29 RX ADMIN — MIDAZOLAM HYDROCHLORIDE 2 MG: 1 INJECTION, SOLUTION INTRAMUSCULAR; INTRAVENOUS at 07:35

## 2017-09-29 RX ADMIN — NALBUPHINE HYDROCHLORIDE 2 MG: 10 INJECTION, SOLUTION INTRAMUSCULAR; INTRAVENOUS; SUBCUTANEOUS at 22:41

## 2017-09-29 RX ADMIN — EPHEDRINE SULFATE 10 MG: 50 INJECTION, SOLUTION INTRAMUSCULAR; INTRAVENOUS; SUBCUTANEOUS at 08:19

## 2017-09-29 RX ADMIN — TRANEXAMIC ACID 1000 MG: 100 INJECTION, SOLUTION INTRAVENOUS at 07:40

## 2017-09-29 RX ADMIN — SODIUM CHLORIDE 125 ML/HR: 0.9 INJECTION, SOLUTION INTRAVENOUS at 06:25

## 2017-09-29 RX ADMIN — MORPHINE SULFATE 0.1 MG: 0.5 INJECTION, SOLUTION EPIDURAL; INTRATHECAL; INTRAVENOUS at 07:35

## 2017-09-29 NOTE — DISCHARGE INSTRUCTIONS
[facility]   Post Operative Joint Replacement Instructions   Dr Vergara [place] Office [address] [phone]     Remove dressing post op day 7  Home Health care will remove the staples/sutures post op day 14  MEDICATIONS      *  Enteric Coated Aspirin 81 mg:  Start taking this twice daily on discharge and continue for 6 weeks  Continue as you were in the hospital/rehab facility     * Iron: Continue the iron supplement twice daily  If you experience nausea or constipation with the medicine discontinue its use  Contact us if the nausea persists  * Pain Medications: Your prescriptions may run out before you return for your next visit  Please give us two regular office day's notice before you run out, to prevent any problems of not having pain medicine  Be advised that prescriptions for Percocet and OxyContin cannot be phoned to your pharmacy  They will need to be picked up at our office  Please refrain from using alcohol with your pain medicines  Percocet contains 325 mg of Tylenol  You should not exceed 3000 mg of Tylenol in a day  Please be careful to not overdose the Tylenol  * Herbal Medicines: Please hold all these preparations until after your first post-operative visit  Unless specifically directed otherwise  ACTIVITY   * Your weight bearing status is: as tolerated     * If you have been furnished with an assistive device  Please feel free to try to wean from using it under the supervision of your therapist      * You may participate in activity as tolerated  It is likely that you will tire more easily for a time after surgery  Please rest when you need it to help your healing process  * Stairs are not restricted for you  Please climb stairs as instructed by your physical therapist      * For hip and knee replacement patients please elevate your leg or legs while resting  This is important to prevent lower leg swelling       * When you are back at home you will likely have physical therapy three times a week  On the days you do not have formal therapy please perform the home exercises given to you  * If you had a hip replacement, please follow the safety precautions given to you by the nurse or therapist taking care of you  * Immediately following the surgery you are not permitted to drive until cleared by your surgeon  This typically does not happen until your first visit after surgery  * Knee replacement patients may be passengers in a vehicle following their discharge from the hospital      * Hip replacement patients are not allowed in a vehicle, except for your trip home and your first follow up visit  Please follow these guidelines unless specifically instructed to start outpatient therapy at an earlier time  * Please do not perform lifting tasks or strenuous domestic activities  * If you currently are employed, you are off work until cleared by your surgeon  Everyone's ability to return to work is determined by his or her abilities  Your return to work may take a few weeks to a few months  * Sexual activities are permitted as tolerated  NORMAL FINDINGS   * Numbness along the incision     * Mechanical click of a knee replacement  * Your incision area will feel warm  WOUND CARE   * It is OK to shower once there is no spotting or drainage for a full 24 hours  Please do not submerge the incision into a pool, bath or hot tub until after your 1st post-operative visit  * Please do not disturb your staples, sutures, or the scabs  It promotes better healing and smaller scars  * Cover the incision with gauze dressing until there is no further drainage  * You may leave the incision open to the air when there is no discharge left on the gauze at changing time  Once this occurs you may shower  * Please be generous with the use of ice  It is a very good remedy  Apply ice for only twenty minutes at a time   You may use it every hour  It is recommended to ice before and after anticipated activities, which includes exercise and physical therapy  * Wear your knee-high pressure stockings every day  They may be removed for sleep at night  Continue to wear them until you are seen for your first follow up  * For knee and hip replacements; your staples will be removed about 14 days after your surgery date  Home nurses and physical therapists typically remove them  If they are unable to, please call our office to have us do it for you  FOLLOW UP   * You should have a scheduled visit with your surgeon scheduled for three to four weeks after surgery  If you do not remember your appointment date and time, or if you are sure you do not have one, please call to set one up  * Please inform the office if you experience one of the following:    - Fever that is not responding to Tylenol and is above 101 degrees    - Redness of the skin that is increasing in area    - Your incision is soaking the dressings with drainage or blood    - You're unable to bear weight on your operative leg or legs

## 2017-09-29 NOTE — OP NOTE
OPERATIVE REPORT  PATIENT NAME: Jeffery Ogden    :  1950  MRN: [id number]  Pt Location: [facility] OR ROOM 02    Operative Note    Jeffery Ogden  2017    Pre-op Diagnosis:   Right Hip Osteoarthritis    Post-op Diagnosis:   Right Hip Osteoarthritis    Procedure:  Right Hip Replacement    Surgeon:  Claude Moser MD    Assistants:  ALISHA Avalos CFA       Anesthesia:  Spinal    Estimated Blood Loss:  350cc    Components:     Implant Name Type Inv  Item Serial No   Lot No  LRB No  Used   SHELL ACETABULAR 52MM POROUS SOLID LCK RING PINNACLE - [id number]  SHELL ACETABULAR 52MM POROUS SOLID LCK RING PINNACLE  DEPUY [id number] Right 1   LINER ACTB ULT LNK PE 36 X 52MM 0DEG NEUTRAL ALTRX - [id number]  LINER ACTB ULT LNK PE 36 X 52MM 0DEG NEUTRAL ALTRX  DEPUY [id number] Right 1   SCREW DANYELLE 6 5 X 20MM SLF TAP PINNACLE - [id number]  SCREW DANYELLE 6 5 X 20MM SLF TAP PINNACLE  DEPUY [id number] Right 1   STEM FEM PRESS FIT / TAPER SZ 12 COLR STD CORAIL - [id number]  STEM FEM PRESS FIT  TAPER SZ 12 COLR STD CORAIL  DEPUY [id number] Right 1   HEAD FEMORAL  TPR 36MM +1 5MM ARTICULEZE BIOLOX DELTA - [id number]   HEAD FEMORAL  TPR 36MM +1 5MM ARTICULEZE BIOLOX DELTA   DEPUY [id number] Right 1       APPROACH:   Direct anterior  INDICATION: Jeffery Ogden is a 74 y o  male with advanced osteoarthritis of the Right hip, which was refractory to nonoperative therapy  He understood the risks and benefits of hip replacement and wished to proceed  DESCRIPTION OF PROCEDURE: On 2017 the patient was brought to the holding area  The identity and surgical site were confirmed by him and the surgeon  Perioperative intravenous antibiotics were given  Anesthesia was administered by the anesthesia team  The leg length examination was performed with him in the supine position after anesthesia  The operative extremity was shorter by 7 or so mm   Then the patient was positioned on the HANA table and preoperative fluoroscopic views were obtained  Then the Right extremity was prepped and draped in the usual sterile manner  A direct anterior approach was performed to the hip in the usual manner, exposing the capsule and doing a capsulotomy  A femoral neck osteotomy was made  The head was removed  Acetabulum was exposed and reamed sequentially and eventually underreamed by 1mm for the impaction of the appropriately sized cup with excellent stability to host bone  This was impacted under direct fluoroscopic guidance to make sure we were satisfied with the position  Also, there was good coverage anteriorly to prevent any impingement  One screw was placed in the posterior-superior quadrant  Osteophytes were removed circumferentially from the acetabulum  Then the polyethylene liner was impacted into the cup and it was down all the way around  Attention was then turned to the femur  Appropriate releases were made  The leg was dropped into hyperextension, external rotation  The canal was accessed without difficulty  It was broached to fit the appropriate size stem, which gave excellent stability within the host bone, both axially and rotationally  We used the reamers distally to prevent any potting  We used the calcar planer as well until we were satisfied with the position of the broach relative to the proximal femur  We trialed  We were able to achieve good stability and good restoration of leg lengths and the stem size and neck were chosen as 12KA  Therefore, trial components were removed  The femoral canal was irrigated and the stem was impacted until it reached a firm endpoint, and it had excellent stability within the host bone, both axially and rotationally  We trialed with the different ball options and the 1 5 ball was chosen based on stabililty, measurement of leg lengths by fluoroscopic views, and the soft tissue tension to be appropriate and not excessively lax or tight    With that, there was excellent stability as the hip was able to come to 120 degrees of external rotation, in neutral, it was able to come to 100 degrees of external rotation, 30 degrees of hyperextension, had a firm endpoint posteriorly and the soft tissue tension was appropriate without being excessively lax or tight  Also, the leg length restoration appeared to be appropriate given the fluoroscopic measurements and the preoperative leg length examination  The -2 ball was lax with reduction and dislocation maneuvers, and there was a tendency toward instability with ER, which was not deemed acceptable  Therefore, the 1 5 ball was chosen and impacted with appropriate force onto a clean trunnion with 3 blows of the mallet  The hip was then reduced  It was irrigated copiously with several liters of pulsed lavage  A periarticular injection was injected for postoperative analgesia  The capsulotomy and the fascia were closed with #1 Vicryl suture  The skin was closed using Vicryl sutures, staples, skin adhesive and a sterile dressing  The patient was then awakened and taken to the recovery room  The assistance of Chon Wall was essential as no qualified resident assistance was available  XRAY REPORT: Postoperative x-rays including an AP view of the Right hip as well as fluoroscopic views of the Right hip taken intraoperatively on 9/29/2017 were checked and revealed a Right hip replacement in good alignment with no fracture or dislocation         Shekhar Nazario MD     Date: 9/29/2017  Time: 5:57 PM

## 2017-09-29 NOTE — CONSULTS
Consultation - Internal Medicine  [facility] 74 y o  male MRN: [id number]  Unit/Bed#: E2 -01 Encounter: [id number]      Impression :-  74 y o  male patient, who has undergone elective right total hip replacement earlier today by Dr Chey Nino  Advanced end-stage degenerative joint disease, failed conservative treatments  Ambulatory dysfunction  Hypertension  Benign prostatic hypertrophy  DJD lumbar spine  Chronic lymphocytic leukemia, followed by Hematology  History of depression currently stable  Chronic alcohol use  Postoperative indwelling Hillman catheter  Pulmonary hypertension  Preoperative hyponatremia  History of prolonged QTC interval      Recommendation: -    Patient is recuperating very nicely at this stage of his recovery  Patient's wife is at bedside and discussed and reviewed all his medications  Due to his underlying history of alcohol will be cautious for any delirium  He may require a short dose of benzodiazepine depending on his symptoms of withdrawal if exhibited  Patient is on multiple antihypertensive medications which will have holds in case his blood pressures below 308 systolic  I would hold off on his sertraline for tonight and resume that from tomorrow  His dose has been cut down to 50 mg due to prolonged QTC, following that his QTC does have come down  He has underlying hyponatremia which could be multifactorial, this will be deferred to his outpatient team which is already working him up for low sodium  This could be either related to his underlying conditions or alcohol use or even use of sertraline  I have reviewed patients medications as initiated on post operative orders by the primary team  Recommend  monitoring closely for any hypoxemia / respiratory insufficieny related to narcotics and to reduce doses and frequency of narcotics if necessary  Maintain Intravenous Fluids for next 24 -48 hours, till patient able to reliably keep meals and meds in  Suggest  Zofran ODT 4 mg sublingually PRN for control of post operative nausea  Patient encouraged to  use Incentive spirometry q 15 minutes while awake to minimize risk of postoperative atelectasis and pneumonia  Patient verbalized to understand and fully comprehend  Recommend DVT prophylaxis with use of Venodyne compression boots  If any factor X A inhibitor,  low molecule weight heparin or Coumadin is planned by the primary team, we will be happy to dose that  drug based on patient's hemostasis  Maintain ASA 81 mg as antiplatelet drug per Ortho  Team  Use Proton Pump inhibitor to minimize risk of gastritis  Monitor for any tinnitus as an early sign of salicylism and check salicylate levels in that situation  Discontinue patient's Hillman catheter within next 24-48 hours in order  to minimize risk of catheter associated UTI  Please check patient's hemoglobin and hematocrit within next 24 hours to ensure that there is no acute blood loss anemia which would need any blood transfusion  We will follow this pleasant patient with your service closely and recommend necessary changes based on  further hospital course and diagnostics  Thank you, Dr Lady Gonzalez , for your kind consultation  HISTORY OF PRESENT ILLNESS:    Reason for Consult:  Post operative management for patient who has undergone elective right hip replacement and has multiple other comorbidities for which we have been asked to further assist the primary team     HPI: Chandler Haddad is a 74 y o  male, retired  with underlying history of chronic lymphocytic leukemia with lymphocytosis at baseline, hyponatremia and recent abnormalities on electrocardiogram who has been suffering with chronic pain in his right hip for a long period of time  He used to work as a  and retired 3 years ago  He started noticing pain for many years and it was initially will localized in the lateral and posterior aspect of his hip    This was causing him to have antalgic gait and stiffness  Patient was unable to sit or stand for prolonged period of time  He was having difficulty climbing steps and had to take 1 step at a time  He took nonsteroidal anti-inflammatory medications including naproxen for a period of time  He tried physical therapy without much benefit  Patient was enrolled for physical therapy and when to [facility] and so multiple other physicians which included Dr Jaime Rivera, Dr Claire Owens and eventually by Dr Pastor Dow at [address] [place] [address] - patient was being treated for lumbar stenosis and had injection therapy  Patient thereafter changed his orthopedic team and went to see Dr Tra Arroyo  Upon his review patient was reportedly having discomfort in his right hip with tenderness on the trochanteric bursa  His range of motion passively was limited  Imaging studies, as reported x-rays of 07/17/2017 reveals severe joint narrowing in the hip joint with osteophyte formation and subchondral sclerosis  MRI of the lumbar spine done on 09/29/2016 revealed multilevel degenerative disc disease with spinal stenosis  Patient was given options either to continue conservative treatments which in fact had not worked very well or to consider more definitive joint replacement  He was started on preoperative assessment by Dr Chucho Bernstein and was found to have electrocardiographic abnormalities  He was referred to Cardiology team and was evaluated by them  He also underwent pulmonary evaluations  At the end of fall that he was advised to cut down on his alcohol and reduce the dose of his sertraline to 50 mg instead of 100 mg which she has been taking  He has been followed by Dr Raymundo Stack for his CLL and has been monitored closely for his lymphocytosis  Patient denies being on any chemotherapeutic drugs  Underwent detailed cardiac workup and was evaluated by Dr Gerri Tran on 09/27/2017      Review of Systems   Constitutional:   No recent appetite change, denies chills, diaphoresis, fatigue or fever  Drinks beer regularly - last drink was Monday  Denies any withdrawal    HEENT:  Negative for congestion, sore throat and tinnitus  No visual complaints  Respiratory:  Negative cough, chest tightness, shortness of breath and wheezing  Cardiovascular:  Negative for chest pain and palpitations  Recent detailed cardiac work up  Gastrointestinal: Negative for abdominal distention or pain, constipation, diarrhea and nausea  Endocrine: Negative for cold intolerance, heat intolerance, polydipsia and polyuria  Genitourinary:                Negative for  dysuria, flank pain  Tolerating Hillman without difficulty  Skin:   Negative for color change, pallor and rash  Neurological:   Negative for dizziness, tremors, seizures, syncope, facial asymmetry, speech difficulty, weakness, light-headedness, numbness and headaches  Hematological:  Musculoskeletal:  Psychiatric:  No h/o spontaneous bruising/bleeding  H/o CLL and follows Dr Matthew Villagran  Joint pains as above  Negative for agitation, behavioral problems, confusion, decreased concentration, dysphoric mood and sleep disturbance  A complete system-based review of systems as discussed with patient is otherwise negative      PAST MEDICAL HISTORY:  Past Medical History:   Diagnosis Date    Anxiety     BPH (benign prostatic hypertrophy)     CLL (chronic lymphocytic leukemia)     2009    Depression     GERD (gastroesophageal reflux disease)     Hearing loss of aging     Hiatal hernia     Hyperlipidemia     Hypertension     Iron deficiency anemia     OA (osteoarthritis)     right hip    Prostatitis     Pulmonary hypertension     Seasonal allergies     UTI (urinary tract infection)     in past    Wears glasses      Past Surgical History:   Procedure Laterality Date    COLONOSCOPY      FRACTURE SURGERY      left lower arm    FRACTURE SURGERY      left femur    TONSILLECTOMY AND ADENOIDECTOMY      TRANSURETHRAL RESECTION OF PROSTATE      x 2       FAMILY HISTORY:    Father    · Family history of Acute Myocardial Infarction    · Family history of Father  At Age 61  Brother    · Family history of Diabetes Mellitus    · Family history of Prostate Cancer     SOCIAL HISTORY:  History   Alcohol Use    Yes     Comment: 4 beers day     History   Drug Use    Types: Marijuana     Comment: few x month     History   Smoking Status    Former Smoker    Quit date: 1980   Smokeless Tobacco    Never Used    Retired , lives with his wife in [address] [place] [address]  ALLERGIES:  Allergies   Allergen Reactions    Codeine Other (See Comments)     agitated    Bactrim [Sulfamethoxazole-Trimethoprim] GI Intolerance       MEDICATIONS:  All current active medications have been reviewed    Current Facility-Administered Medications   Medication Dose Route Frequency Provider Last Rate Last Dose    [START ON 2017] acetaminophen (TYLENOL) tablet 650 mg  650 mg Oral Q6H PRN Inderjit Paredes PA-C        aspirin chewable tablet 81 mg  81 mg Oral BID Inderjit Paredes PA-C        calcium carbonate (TUMS) chewable tablet 1,000 mg  1,000 mg Oral Daily PRN Inderjit Paredes PA-C        ceFAZolin (ANCEF) IVPB (premix) 1,000 mg  1,000 mg Intravenous Q8H Saeed Esparza PA-C        dexamethasone (DECADRON) injection 8 mg  8 mg Intravenous Once PRN Rene Cisneros,         diphenhydrAMINE (BENADRYL) injection 25 mg  25 mg Intravenous Q6H PRN Seth Harrington DO        [START ON 2017] HYDROmorphone (DILAUDID) 1 mg/mL injection 0 2 mg  0 2 mg Intravenous Q3H PRN Saeed Esparza PA-C        ketorolac (TORADOL) 30 mg/mL injection 15 mg  15 mg Intravenous Q6H PRN Saeed Esparza PA-C        lactated ringers infusion  125 mL/hr Intravenous Continuous Inderjit Paredes PA-C 125 mL/hr at 17 1014 125 mL/hr at 17 1014    metoclopramide (REGLAN) injection 5 mg  5 mg Intravenous Q6H PRN Rene Alameya, DO        multivitamin-minerals (CENTRUM) tablet 1 tablet  1 tablet Oral Daily Saeed Esparza PA-C        nalbuphine (NUBAIN) injection 2 mg  2 mg Intravenous Q3H PRN Francine Olivo DO        naloxone (NARCAN) 0 4 mg/mL injection 0 1 mg  0 1 mg Intravenous Q3 min PRN Francine Olivo DO        [START ON 2017] ondansetron (ZOFRAN) injection 4 mg  4 mg Intravenous Q6H PRN Saeed Esparza PA-C        [facility]etron [facility]) injection 4 mg  4 mg Intravenous Q4H PRN Francine Olivo DO        [START ON 2017] oxyCODONE (ROXICODONE) immediate release tablet 10 mg  10 mg Oral Q4H PRN Saeed Esparza PA-C        [START ON 2017] oxyCODONE (ROXICODONE) IR tablet 5 mg  5 mg Oral Q4H PRN Saeed Esparza PA-C        oxyCODONE-acetaminophen (PERCOCET) 5-325 mg per tablet 2 tablet  2 tablet Oral Q6H PRN Francine Olivo DO        pantoprazole (PROTONIX) EC tablet 40 mg  40 mg Oral Early Morning Saeed Esparza PA-C        [facility]) tablet 8 6 mg  1 tablet Oral Daily Saeed Esparza PA-C        sodium chloride 0 9 % infusion  125 mL/hr Intravenous Continuous Lynford Din, DO   Stopped at 17 1014    [START ON 2017] traMADol (ULTRAM) tablet 50 mg  50 mg Oral Q6H PRN Mary Lou Burroughs PA-C           Prescriptions Prior to Admission   Medication    amLODIPine (NORVASC) 5 mg tablet    carvedilol (COREG) 12 5 mg tablet    lisinopril (ZESTRIL) 40 mg tablet    minoxidil (LONITEN) 10 mg tablet    omeprazole (PriLOSEC) 40 MG capsule    sertraline (ZOLOFT) 100 mg tablet    simvastatin (ZOCOR) 20 mg tablet    tamsulosin (FLOMAX) 0 4 mg    zolpidem (AMBIEN) 5 mg tablet    aspirin (ECOTRIN LOW STRENGTH) 81 mg EC tablet    milk thistle 175 MG tablet    naproxen (NAPROSYN) 500 mg tablet    Omega-3 Fatty Acids (FISH OIL) 1000 MG CPDR           PHYSICAL EXAM:    Vitals:  HR:  [48-60] 52  Resp:  [13-19] 14  BP: ()/(56-84) 132/78  SpO2:  [97 %-100 %] 97 %  Temp (24hrs), Av 9 °F (36 6 °C), Min:97 5 °F (36 4 °C), Max:99 4 °F (37 4 °C)  Current: Temperature: 97 6 °F (36 4 °C)  Body mass index is 27 37 kg/m²  General Appearance:    Awake, alert, cooperative, no distress, appears of stated age  Head:    Normocephalic, without obvious abnormality, atraumatic   Eyes:    Pupils equal in size,conjunctiva/corneas clear, EOM's intact  Nose:   No evidence of epistaxis/ discharge from nares  Throat:   Lips, mucosa, and tongue normal / dry  Neck:   Supple, symmetrical, trachea midline, no JVP  Back:     Straight / no tenderness  Lungs:     Bilateral air entry is broncho-alveolar and equal        No crepitation or rales  No pleural rub  Heart:    Regular rate and rhythm, S1S2 normal, no murmur, rub  Abdomen:     Soft, non-tender, no masses, no organomegaly   Genitalia:   Indwelling Hillman catheter  Clear urine +, No hematuria  Musculoskeletal:   Extremities appear normal, mild disuse atrophy is noted on lateral aspect of right hip, atraumatic, no cyanosis or edema  Surgical site on right hip has clear dressing / no bleeding or hemorrhage apparent  Vascular:  1+ in Posterior tibialis / dorsalis pedis  Skin:  Skin color, texture, turgor normal, no rashes or lesions   Neurologic:  Oriented/ Awake/ facial symmetry maintained  Speech is intact  Muscle bulk and strength is equivocal in B/L Upper and lower extremities except on operated right lower limb  Light sensation is intact B/l LE  B/L [place] flexion is WNL  LABS, IMAGING, & OTHER STUDIES:  Lab Results:  I have personally reviewed pertinent labs    Lab Results   Component Value Date     (L) 09/05/2017    CL 97 (L) 09/05/2017    CO2 30 09/05/2017    ANIONGAP 7 09/05/2017    BUN 10 09/05/2017    CREATININE 0 81 09/05/2017    EGFR 94 09/05/2017    GLUCOSE 109 02/15/2017    CALCIUM 8 9 09/05/2017    AST 36 09/05/2017    ALT 35 09/05/2017    ALKPHOS 84 09/05/2017    PROT 8 2 09/05/2017    ALBUMIN 4 3 09/05/2017    BILITOT 0 62 09/05/2017     Lab Results   Component Value Date    WBC 19 78 (H) 09/05/2017    WBC 23 30 (H) 08/17/2017    WBC 17 27 (H) 02/15/2017    HGB 11 8 (L) 09/05/2017    HGB 11 8 (L) 08/17/2017    HGB 9 0 (L) 02/15/2017     (L) 09/05/2017     08/17/2017     02/15/2017       Lab Results   Component Value Date    INR 1 19 (H) 09/05/2017         Imaging Studies:   I have personally reviewed pertinent imaging study reports  Imaging:     Xr Chest Pa & Lateral    Result Date: 9/7/2017  Narrative:   CHEST INDICATION:  72-year-old man undergoing preoperative evaluation   COMPARISON:  Chest radiograph 9/4/2014 VIEWS:  Frontal and lateral projections   IMAGES:  3   FINDINGS:  Cardiomediastinal silhouette appears unremarkable  The lungs are clear  No pneumothorax or pleural effusion  Healed right rib fractures are unchanged  Impression: No active pulmonary disease  Xr Hip/pelv 1 Vw Right    Result Date: 9/29/2017  Narrative:   RIGHT HIP INDICATION:  Follow-up after hip replacement   COMPARISON: None   VIEWS: AP coned down views of the hip   IMAGES:  1   FINDINGS: There is no acute fracture or dislocation  Hip prosthesis appears satisfactory in alignment  No lytic or blastic lesions are seen  Minimal soft tissue gas evident related to surgery  There are skin staples laterally  Impression: Satisfactory appearance of hip prosthesis    Xr Hip/pelv 1 Vw Right     Result Date: 9/29/2017  Narrative: C-ARM - right hip   INDICATION: Procedure guidance   COMPARISON:  5/12/2014   TECHNIQUE: FLUOROSCOPY TIME:   12 sec 4 FLUOROSCOPIC IMAGES FINDINGS: The initial image demonstrates severe osteoarthritis of the right hip  Subsequent images demonstrate placement of a right hip prosthesis  Positioning appears typical   The femoral head component is not evident on the images obtained  Osseous and soft tissue detail limited by technique  Impression: Right hip prosthesis placement noted    Alignment typical   Please refer to the separate procedure notes for additional details  EKG, Pathology, and Other Studies:   I have personally reviewed pertinent reports  Patient had electrocardiogram done on 09/05/2017 which showed WI interval of 186 milliseconds,  QTC interval 488 milliseconds, this has improved from previous interval of 504 milliseconds    Normal sinus rhythm  Prolonged QT  Abnormal ECG  When compared with ECG of 11-NOV-2016 03:34,  Nonspecific T wave abnormality no longer evident in Lateral leads          Transthoracic Echocardiogram  2D, M-mode, Doppler, and Color Doppler     Study date:  18-Sep-2017      SUMMARY     LEFT VENTRICLE:  Systolic function was normal  Ejection fraction was estimated to be 60 %  There were no regional wall motion abnormalities  Wall thickness was mildly increased  There was mild concentric hypertrophy      AORTIC VALVE:  There was mild regurgitation      TRICUSPID VALVE:  There was trace regurgitation      PULMONARY ARTERIES:  Systolic pressure was at the upper limits of normal  Estimated peak pressure was 35 mmHg      HISTORY: PRIOR HISTORY: Pulmonary hypertension  Risk factors: hypertension, medication-treated hypercholesterolemia, and a former history of cigarette use (quitting more than one month ago)      PROCEDURE: The study was performed in the [address] [place] [address]  This was a routine study  The transthoracic approach was used  The study included complete 2D imaging, M-mode, complete spectral Doppler, and color Doppler  The  heart rate was 93 bpm, at the start of the study  Image quality was adequate      LEFT VENTRICLE: Size was normal  Systolic function was normal  Ejection fraction was estimated to be 60 %  There were no regional wall motion abnormalities  Wall thickness was mildly increased  There was mild concentric hypertrophy    DOPPLER: Left ventricular diastolic function parameters were normal      RIGHT VENTRICLE: The size was normal  Systolic function was normal  Wall thickness was normal      LEFT ATRIUM: Size was at the upper limits of normal      RIGHT ATRIUM: Size was at the upper limits of normal      MITRAL VALVE: Valve structure was normal  There was normal leaflet separation  DOPPLER: The transmitral velocity was within the normal range  There was no evidence for stenosis  There was no regurgitation      AORTIC VALVE: The valve was trileaflet  Leaflets exhibited mildly increased thickness and normal cuspal separation  DOPPLER: Transaortic velocity was within the normal range  There was no evidence for stenosis  There was mild  regurgitation      TRICUSPID VALVE: The valve structure was normal  There was normal leaflet separation  DOPPLER: The transtricuspid velocity was within the normal range  There was no evidence for stenosis  There was trace regurgitation      PULMONIC VALVE: Leaflets exhibited normal thickness, no calcification, and normal cuspal separation  DOPPLER: The transpulmonic velocity was within the normal range  There was no regurgitation      PERICARDIUM: There was no pericardial effusion  The pericardium was normal in appearance      AORTA: The root exhibited normal size      SYSTEMIC VEINS: IVC: The inferior vena cava was normal in size and course  Respirophasic changes were normal      PULMONARY ARTERY: DOPPLER: Systolic pressure was at the upper limits of normal  Estimated peak pressure was 35 mmHg     5 mmHg            Portions of the record may have been created with voice recognition software  Occasional wrong word or "sound a like" substitutions may have occurred due to the inherent limitations of voice recognition software  Read the chart carefully and recognize, using context, where substitutions have occurred

## 2017-09-29 NOTE — PHYSICAL THERAPY NOTE
Physical Therapy Evaluation  PT evaluation [id number]    Patient Active Problem List   Diagnosis    Hypertension    CLL (chronic lymphocytic leukemia)       Past Medical History:   Diagnosis Date    Anxiety     BPH (benign prostatic hypertrophy)     CLL (chronic lymphocytic leukemia)     2009    Depression     GERD (gastroesophageal reflux disease)     Hearing loss of aging     Hiatal hernia     Hyperlipidemia     Hypertension     Iron deficiency anemia     OA (osteoarthritis)     right hip    Prostatitis     Pulmonary hypertension     Seasonal allergies     UTI (urinary tract infection)     in past    Wears glasses        Past Surgical History:   Procedure Laterality Date    COLONOSCOPY      FRACTURE SURGERY      left lower arm    FRACTURE SURGERY      left femur    TONSILLECTOMY AND ADENOIDECTOMY      TRANSURETHRAL RESECTION OF PROSTATE      x 2        09/29/17 1345   Note Type   Note type Eval/Treat   Pain Assessment   Pain Assessment No/denies pain   Home Living   Type of Home Apartment   Home Layout Stairs to enter with rails  (2 stairs to enter)   Prior Function   Level of [place] Independent with ADLs and functional mobility   Lives With Spouse   Receives Help From Family   ADL Assistance Independent   IADLs Independent   Falls in the last 6 months 0   Comments pt indep without assistive device      Restrictions/Precautions   Weight Bearing Precautions Per Order Yes   RLE Weight Bearing Per Order WBAT   General   Additional Pertinent History etoh per MD note   Family/Caregiver Present Yes   Cognition   Overall Cognitive Status WFL   Orientation Level Oriented X4   RUE Assessment   RUE Assessment WNL   LUE Assessment   LUE Assessment WNL   RLE Assessment   RLE Assessment X  (strength 4+/5)   LLE Assessment   LLE Assessment WNL   Coordination   Movements are Fluid and Coordinated 1   Sensation WFL   Bed Mobility   Rolling R 7  Independent   Rolling L 7  Independent   Supine to Sit 7 Independent   Transfers   Sit to Stand 4  Minimal assistance   Stand to Sit 4  Minimal assistance   Ambulation/Elevation   Gait pattern Antalgic   Gait Assistance 4  Minimal assist   Additional items Verbal cues; Tactile cues   Assistive Device Rolling walker   Distance 40'   Balance   Static Sitting Normal   Dynamic Sitting Good   Static Standing Fair   Dynamic Standing Fair -   Ambulatory Fair -   Endurance Deficit   Endurance Deficit No   Activity Tolerance   Activity Tolerance Patient tolerated treatment well   Nurse Made Aware yes- allie   Assessment   Prognosis Good   Problem List Decreased strength;Decreased endurance; Impaired balance;Decreased mobility; Decreased safety awareness;Decreased skin integrity;Orthopedic restrictions   Assessment pt admited with r hip djd and failed conservative care  underwent r anterior wong and referred to PT  pt was indep PTA, no assistive device  pt demonstrated moderate functional limitations dueto recent hip surgery  pt was able to amb with min assist 40'  needing safety instructions as pt is slightly impulsive  pt haad deficits above and will need skilled PT  pt will ba able to return home, home vs OPPT pt preference at time of discharge   pt tolerated well  Barriers to Discharge None   Goals   Patient Goals go home   STG Expiration Date 10/03/17   Short Term Goal #1 indep with bed mobility, transfers, amgb wtih rw for > 200' wbat rle  improve strength and balance by 1/2 grade  improve safety practices  normal gait pattern  stairs with railing and supervision  Plan   Treatment/Interventions Functional transfer training;LE strengthening/ROM; Elevations; Therapeutic exercise; Endurance training;Patient/family training;Equipment eval/education; Bed mobility;Gait training;Spoke to nursing   PT Frequency 7x/wk; Twice a day   Recommendation   Recommendation Home with family support; Outpatient PT; Home PT   PT - OK to Discharge No   Modified [place] Scale   Modified [place] Scale 3 Barthel Index   Feeding 10   Bathing 0   Grooming Score 5   Dressing Score 10   Bladder Score 0   Bowels Score 10   Toilet Use Score 5   Transfers (Bed/Chair) Score 5   Mobility (Level Surface) Score 0   Stairs Score 0   Barthel Index Score 45   History: co - morbidities, fall risk, use of assistive device, assist for adl's,  multiple lines  Exam: impairments in locomotion, musculoskeletal, balance, joint integrity, self care  Clinical: unpredictabel  Complexity:high  Eloisa November, PT  Time In:1330Time [id number]  Total Time: 15      S:  No pain , happy so far  O:  Gait training using rw   Pt antalgic on eval and impulsive, elevating risk for fall  Pt instructed in correct gait sequencing and needed occasional reinforcement  rw too far in front on several occasions  A:  Continues to need further instruction with emphasis on fall risk prevention     P:  Continue PT    Eloisa November, PT

## 2017-09-29 NOTE — PROGRESS NOTES
Doing well  Has some itching; has already received benadryl  CDI  5/5 EFTG operative side  A/P: PT; Med input; pain control

## 2017-09-29 NOTE — PLAN OF CARE
Problem: PHYSICAL THERAPY ADULT  Goal: Performs mobility at highest level of function for planned discharge setting  See evaluation for individualized goals  Treatment/Interventions: Functional transfer training, LE strengthening/ROM, Elevations, Therapeutic exercise, Endurance training, Patient/family training, Equipment eval/education, Bed mobility, Gait training, Spoke to nursing          See flowsheet documentation for full assessment, interventions and recommendations  Prognosis: Good  Problem List: Decreased strength, Decreased endurance, Impaired balance, Decreased mobility, Decreased safety awareness, Decreased skin integrity, Orthopedic restrictions  Assessment: pt admited with r hip djd and failed conservative care  underwent r anterior wong and referred to PT  pt was indep PTA, no assistive device  pt demonstrated moderate functional limitations dueto recent hip surgery  pt was able to amb with min assist 40'  needing safety instructions as pt is slightly impulsive  pt haad deficits above and will need skilled PT  pt will ba able to return home, home vs OPPT pt preference at time of discharge   pt tolerated well  Barriers to Discharge: None     Recommendation: Home with family support, Outpatient PT, Home PT     PT - OK to Discharge: No    See flowsheet documentation for full assessment

## 2017-09-30 LAB
ANION GAP SERPL CALCULATED.3IONS-SCNC: 5 MMOL/L (ref 4–13)
BUN SERPL-MCNC: 7 MG/DL (ref 5–25)
CALCIUM SERPL-MCNC: 7.9 MG/DL (ref 8.3–10.1)
CHLORIDE SERPL-SCNC: 102 MMOL/L (ref 100–108)
CO2 SERPL-SCNC: 29 MMOL/L (ref 21–32)
CREAT SERPL-MCNC: 0.75 MG/DL (ref 0.6–1.3)
GFR SERPL CREATININE-BSD FRML MDRD: 96 ML/MIN/1.73SQ M
GLUCOSE SERPL-MCNC: 118 MG/DL (ref 65–140)
POTASSIUM SERPL-SCNC: 3.7 MMOL/L (ref 3.5–5.3)
SODIUM SERPL-SCNC: 136 MMOL/L (ref 136–145)

## 2017-09-30 PROCEDURE — 80048 BASIC METABOLIC PNL TOTAL CA: CPT | Performed by: INTERNAL MEDICINE

## 2017-09-30 PROCEDURE — 97116 GAIT TRAINING THERAPY: CPT

## 2017-09-30 PROCEDURE — 97530 THERAPEUTIC ACTIVITIES: CPT

## 2017-09-30 PROCEDURE — 97110 THERAPEUTIC EXERCISES: CPT

## 2017-09-30 RX ORDER — NALBUPHINE HCL 10 MG/ML
2 AMPUL (ML) INJECTION
Status: DISCONTINUED | OUTPATIENT
Start: 2017-09-30 | End: 2017-10-02 | Stop reason: HOSPADM

## 2017-09-30 RX ORDER — TRIAMCINOLONE ACETONIDE 5 MG/G
CREAM TOPICAL 2 TIMES DAILY
Status: DISCONTINUED | OUTPATIENT
Start: 2017-09-30 | End: 2017-10-02 | Stop reason: HOSPADM

## 2017-09-30 RX ORDER — DIPHENHYDRAMINE HYDROCHLORIDE 50 MG/ML
50 INJECTION INTRAMUSCULAR; INTRAVENOUS EVERY 6 HOURS PRN
Status: DISPENSED | OUTPATIENT
Start: 2017-09-30 | End: 2017-09-30

## 2017-09-30 RX ORDER — POLYETHYLENE GLYCOL 3350 17 G/17G
17 POWDER, FOR SOLUTION ORAL DAILY
Status: DISCONTINUED | OUTPATIENT
Start: 2017-09-30 | End: 2017-10-02 | Stop reason: HOSPADM

## 2017-09-30 RX ORDER — ZOLPIDEM TARTRATE 5 MG/1
5 TABLET ORAL
Status: DISCONTINUED | OUTPATIENT
Start: 2017-09-30 | End: 2017-10-02 | Stop reason: HOSPADM

## 2017-09-30 RX ORDER — DIPHENHYDRAMINE HCL 25 MG
25 TABLET ORAL EVERY 6 HOURS PRN
Status: DISCONTINUED | OUTPATIENT
Start: 2017-09-30 | End: 2017-10-02 | Stop reason: HOSPADM

## 2017-09-30 RX ADMIN — TRAMADOL HYDROCHLORIDE 50 MG: 50 TABLET ORAL at 15:45

## 2017-09-30 RX ADMIN — KETOROLAC TROMETHAMINE 15 MG: 30 INJECTION, SOLUTION INTRAMUSCULAR at 08:30

## 2017-09-30 RX ADMIN — ASPIRIN 81 MG 81 MG: 81 TABLET ORAL at 08:24

## 2017-09-30 RX ADMIN — CARVEDILOL 12.5 MG: 12.5 TABLET, FILM COATED ORAL at 08:24

## 2017-09-30 RX ADMIN — TAMSULOSIN HYDROCHLORIDE 0.4 MG: 0.4 CAPSULE ORAL at 15:45

## 2017-09-30 RX ADMIN — NALBUPHINE HYDROCHLORIDE 2 MG: 10 INJECTION, SOLUTION INTRAMUSCULAR; INTRAVENOUS; SUBCUTANEOUS at 01:29

## 2017-09-30 RX ADMIN — PANTOPRAZOLE SODIUM 40 MG: 40 TABLET, DELAYED RELEASE ORAL at 06:48

## 2017-09-30 RX ADMIN — DIPHENHYDRAMINE HYDROCHLORIDE 50 MG: 50 INJECTION, SOLUTION INTRAMUSCULAR; INTRAVENOUS at 07:23

## 2017-09-30 RX ADMIN — HYDROMORPHONE HYDROCHLORIDE 0.2 MG: 1 INJECTION, SOLUTION INTRAMUSCULAR; INTRAVENOUS; SUBCUTANEOUS at 18:10

## 2017-09-30 RX ADMIN — AMLODIPINE BESYLATE 5 MG: 5 TABLET ORAL at 17:19

## 2017-09-30 RX ADMIN — SENNOSIDES 8.6 MG: 8.6 TABLET, FILM COATED ORAL at 08:24

## 2017-09-30 RX ADMIN — OXYCODONE HYDROCHLORIDE AND ACETAMINOPHEN 2 TABLET: 5; 325 TABLET ORAL at 21:09

## 2017-09-30 RX ADMIN — ACETAMINOPHEN 650 MG: 325 TABLET, FILM COATED ORAL at 17:28

## 2017-09-30 RX ADMIN — CARVEDILOL 12.5 MG: 12.5 TABLET, FILM COATED ORAL at 15:45

## 2017-09-30 RX ADMIN — PANTOPRAZOLE SODIUM 40 MG: 40 TABLET, DELAYED RELEASE ORAL at 15:44

## 2017-09-30 RX ADMIN — SODIUM CHLORIDE, SODIUM LACTATE, POTASSIUM CHLORIDE, AND CALCIUM CHLORIDE 125 ML/HR: .6; .31; .03; .02 INJECTION, SOLUTION INTRAVENOUS at 03:11

## 2017-09-30 RX ADMIN — ZOLPIDEM TARTRATE 5 MG: 5 TABLET, FILM COATED ORAL at 01:29

## 2017-09-30 RX ADMIN — TRIAMCINOLONE ACETONIDE: 5 CREAM TOPICAL at 23:08

## 2017-09-30 RX ADMIN — KETOROLAC TROMETHAMINE 15 MG: 30 INJECTION, SOLUTION INTRAMUSCULAR at 03:13

## 2017-09-30 RX ADMIN — TRIAMCINOLONE ACETONIDE: 5 CREAM TOPICAL at 17:19

## 2017-09-30 RX ADMIN — DIPHENHYDRAMINE HCL 25 MG: 25 TABLET ORAL at 15:37

## 2017-09-30 RX ADMIN — ZOLPIDEM TARTRATE 5 MG: 5 TABLET, FILM COATED ORAL at 23:06

## 2017-09-30 RX ADMIN — OXYCODONE HYDROCHLORIDE AND ACETAMINOPHEN 2 TABLET: 5; 325 TABLET ORAL at 06:48

## 2017-09-30 RX ADMIN — OXYCODONE HYDROCHLORIDE 10 MG: 10 TABLET ORAL at 17:18

## 2017-09-30 RX ADMIN — DIPHENHYDRAMINE HCL 25 MG: 25 TABLET ORAL at 21:09

## 2017-09-30 RX ADMIN — ASPIRIN 81 MG 81 MG: 81 TABLET ORAL at 17:21

## 2017-09-30 RX ADMIN — MINOXIDIL 10 MG: 10 TABLET ORAL at 08:24

## 2017-09-30 RX ADMIN — POLYETHYLENE GLYCOL 3350 17 G: 17 POWDER, FOR SOLUTION ORAL at 18:12

## 2017-09-30 RX ADMIN — PRAVASTATIN SODIUM 10 MG: 20 TABLET ORAL at 15:43

## 2017-09-30 RX ADMIN — OXYCODONE HYDROCHLORIDE 10 MG: 10 TABLET ORAL at 12:55

## 2017-09-30 RX ADMIN — NALBUPHINE HYDROCHLORIDE 2 MG: 10 INJECTION, SOLUTION INTRAMUSCULAR; INTRAVENOUS; SUBCUTANEOUS at 08:30

## 2017-09-30 RX ADMIN — Medication 1 TABLET: at 08:24

## 2017-09-30 RX ADMIN — LISINOPRIL 40 MG: 20 TABLET ORAL at 08:23

## 2017-09-30 RX ADMIN — SERTRALINE HYDROCHLORIDE 50 MG: 50 TABLET ORAL at 08:24

## 2017-09-30 NOTE — PROGRESS NOTES
Orthopedic Total Hip Progress Note    ASSESSMENT & PLAN:  Status post- RIGHT total hip arthroplasty: Doing well postoperatively  Pain Relief: Patient doing well with pain medications  Comfortable  Has been out of bed and to bathroom on own  Activity: Patient may be WBAT  Getting up to bathroom by self  Weight Bearing: Weight bearing as tollerated     LOS: 1 day     SUBJECTIVE:      Systemic or Specific Complaints: No Complaints    OBJECTIVE:  Vital signs in last 24 hours:  Temp:  [97 °F (36 1 °C)-100 2 °F (37 9 °C)] 100 2 °F (37 9 °C)  HR:  [48-69] 69  Resp:  [13-19] 18  BP: ()/(56-81) 168/81  General: alert, appears stated age and cooperative   Neurovascular: Tibial nerve: Intact, Superficial peroneal nerve: Intact and Deep peroneal nerve: Intact  Dorsalis pedis pulse: 2+, Posterior tibial pulse: 2+ and Capillary refill: Normal   Wound: No Erythema   Range of Motion: Normal and As expected post THR   DVT Exam: Negative Morris's sign  No cords or calf tenderness  No significant calf/ankle edema  Data Review  CBC:   Lab Results   Component Value Date    WBC 19 78 (H) 09/05/2017    WBC 22 68 (H) 07/20/2015    RBC 3 92 09/05/2017    RBC 4 14 07/20/2015    HGB 11 8 (L) 09/05/2017    HGB 10 7 (L) 07/20/2015    HCT 34 7 (L) 09/05/2017    HCT 33 0 (L) 07/20/2015     (L) 09/05/2017     07/20/2015       Plan: Mobilize with PT / OT  DVT prophylaxis -  mg BID x 6 weeks  D/C Planning for Disposition - Plan to d/c home today if doing well  Plan to start PT at home  WBAT  ?     Angelica Radford PA-C  Date: 9/30/2017  Time: 7:42 AM

## 2017-09-30 NOTE — PHYSICAL THERAPY NOTE
Physical Therapy Progress Note     09/30/17 0853   Pain Assessment   Pain Assessment 0-10   Pain Score 4   Pain Type Surgical pain   Pain Location Hip   Pain Orientation Right   Hospital Pain Intervention(s) Ambulation/increased activity;Cold applied   Response to Interventions Tolerated  Restrictions/Precautions   Weight Bearing Precautions Per Order Yes   RLE Weight Bearing Per Order WBAT   Other Precautions Fall Risk;Pain   General   Chart Reviewed Yes   Response to Previous Treatment Patient with no complaints from previous session  Family/Caregiver Present No   Subjective   Subjective Willing to participate in therapy this AM    Bed Mobility   Supine to Sit 7  Independent   Transfers   Sit to Stand 5  Supervision   Additional items Assist x 1;Bedrails; Increased time required;Verbal cues   Stand to Sit 5  Supervision   Additional items Assist x 1; Armrests; Increased time required;Verbal cues   Ambulation/Elevation   Gait pattern Decreased foot clearance;Decreased R stance; Antalgic   Gait Assistance 5  Supervision   Additional items Assist x 1; Tactile cues; Verbal cues   Assistive Device Rolling walker   Distance 120'   Balance   Static Sitting Normal   Dynamic Sitting Good   Static Standing Fair   Dynamic Standing Fair   Ambulatory Fair   Endurance Deficit   Endurance Deficit No   Activity Tolerance   Activity Tolerance Patient tolerated treatment well   Nurse Made Aware Yes   Exercises   THR Sitting;10 reps;AROM; Bilateral   Assessment   Prognosis Good   Problem List Decreased strength;Decreased range of motion;Decreased endurance; Impaired balance;Decreased mobility;Orthopedic restrictions;Pain   Assessment Pt  supine in bed upon my arrival  Able to complete all transfers practicing proper technique with no noted LOB  Progressed with an increased amb  trial with the RW and standbyA of therapist with no noted LOB  Noted improvement in LE sequencing   After continued amb  pt  returned to room and was positioned seated in bedside chair where HEP was performed  Remained seated in bedside chair with ice applied at end of treatment session  Pt  will continue to progress with PT goals to ensure a safe d/c home with Home PT and family support as needed when medically stable  Barriers to Discharge Inaccessible home environment   Barriers to Discharge Comments CHRISTOPHER   Goals   Patient Goals To go home  STG Expiration Date 10/03/17   Treatment Day 1   Plan   Treatment/Interventions Functional transfer training;LE strengthening/ROM; Therapeutic exercise; Endurance training;Bed mobility;Gait training;Spoke to nursing;Spoke to case management   Progress Improving as expected   PT Frequency Twice a day   Recommendation   Recommendation Home PT; Home with family support   Equipment Recommended [facility] [place]; Other (Comment)  (RW, 3 in 1 commode)   PT - OK to Discharge No  (Continued progression of PT goals )     Lance Chapa, PTA

## 2017-09-30 NOTE — CASE MANAGEMENT
[address] [facility] in the [facility] by Maycol Vaughn for 2017  Network Utilization Review Department  Phone: [phone]; Fax [phone]  ATTENTION: The Network Utilization Review Department is now centralized for our 7 Facilities  Make a note that we have a new phone and fax numbers for our Department  Please call with any questions or concerns to [phone] and carefully follow the prompts so that you are directed to the right person  All voicemails are confidential  Fax any determinations, approvals, denials, and requests for initial or continue stay review clinical to [phone]  Due to HIGH CALL volume, it would be easier if you could please send faxed requests to expedite your requests and in part, help us provide discharge notifications faster  Initial Clinical Review    Age/Sex: 74 y o  male    Surgery Date: 9/29/17    Procedure: Right Hip Replacement    Anesthesia: Spinal    Admission Orders: Date/Time/Statement: 9/29/17 @ 99 [id number]     Orders Placed This Encounter   Procedures    Inpatient Admission     Standing Status:   Standing     Number of Occurrences:   1     Order Specific Question:   Admitting Physician     Answer:   Annie Fox     Order Specific Question:   Level of Care     Answer:   Med Surg [16]     Order Specific Question:   Estimated length of stay     Answer:   Inpatient Only Surgery       Vital Signs: /81   Pulse 69   Temp 100 2 °F (37 9 °C) (Tympanic)   Resp 18   Ht 5' 8" (1 727 m)   Wt 81 6 kg (180 lb)   SpO2 96%   BMI 27 37 kg/m²     Diet:        Diet Orders            Start     Ordered    09/29/17 [facility]  Room Service  Once     Question:  Type of Service  Answer:  Room Service: No assistance needed    09/29/17 1839    09/29/17 1051  Diet Regular; Regular House  Diet effective now     Question Answer Comment   Diet Type Regular    Regular Regular House    RD to adjust diet per protocol?  Yes        09/29/17 1051 Mobility:   PT eval   Wt bearing as shira  Activity as shira  Assistive devices    DVT Prophylaxis: Aspirin 81 mg BID    Pain Control:   Pain Medications             sertraline (ZOLOFT) 100 mg tablet Take 50 mg by mouth daily      aspirin (ECOTRIN LOW STRENGTH) 81 mg EC tablet Take 81 mg by mouth daily    oxyCODONE (ROXICODONE) 5 mg immediate release tablet Take 1-2 tablets by mouth every 4 (four) hours as needed for moderate pain for up to 10 days Max Daily Amount: 60 mg

## 2017-09-30 NOTE — PLAN OF CARE
Problem: Potential for Falls  Goal: Patient will remain free of falls  INTERVENTIONS:  - Assess patient frequently for physical needs  -  Identify cognitive and physical deficits and behaviors that affect risk of falls    -  [place] fall precautions as indicated by assessment   - Educate patient/family on patient safety including physical limitations  - Instruct patient to call for assistance with activity based on assessment  - Modify environment to reduce risk of injury  - Consider OT/PT consult to assist with strengthening/mobility   Outcome: Progressing

## 2017-09-30 NOTE — SOCIAL WORK
D/c planning consult received  Patient s/p Rt THR, PT recommending home PT and RW/BSC  CM met with patient at bedside to address d/c needs  CM introduced self and role  Patient resides with his wife and reports being independent with ADL's PTA  Patient has help/support available  Patient would like RW, BSC and shower chair; referral sent to [facility]  Patient has 35% co-insurance, totaling $27 15 for RW/BSC and $4 86 for shower chair  RW and BSC delivered to patient's room, [facility] to follow up with patient on Monday re deliver of shower chair  Patient agreeable to costs  Patient would like to use [facility]; referral sent and accepted  AVS updated  Rx coverage available  Family to transport home at d/c  No further needs at present  CM will remain available as needed

## 2017-09-30 NOTE — PROGRESS NOTES
Progress Note - Internal Medicine   Jeffery Ogden 74 y o  male MRN: [id number]  Unit/Bed#: E2 -01 Encounter: [id number]      Impression :    POD #1 elective right total hip replacement by Dr Clifton Hines  Advanced end-stage degenerative joint disease, failed conservative treatments  Ambulatory dysfunction  Hypertension  Benign prostatic hypertrophy  DJD lumbar spine  Chronic lymphocytic leukemia, followed by Hematology  History of depression currently stable  Chronic alcohol use  Pulmonary hypertension  Preoperative hyponatremia  History of prolonged QTC interval     Recommendation:    Patient is generally doing well, he has severe pruritus in his back for which he is being started on triamcinolone steroid cream b i d  in addition to Benadryl p r n  as necessary for severe pruritus  Patient may have mild element of acute dermatitis secondary to moisture related to sweating  I do not see any acute allergic drug reaction at this point  Continue to monitor closely  Multiple family members were present in the room and we reviewed his x-ray before and after surgery  Recommend checking patient's white count due to his leukocytosis secondary to chronic lymphocytic leukemia  Full fall precautions, incentive spirometry, early mobilization all reviewed with him in detail  Abstinence from alcohol would be beneficial     Subjective:     Patient seen and examined today  EMR and overnight events reviewed  Resting comfortably, not in any particular distress except for severe pruritus in his back  States that Benadryl was given to him IV and it took slight edge off  He does not have any major pain in his hip area denies any swelling bleeding or cramps in his right leg  He is very cheerful and pleasant multiple family members were in the room and were watching [employer] football with interest   Patient's family members came all the way from [place] to meet him and that really helped his mood      Review of Systems     Constitutional: Denies appetite change  No chills, diaphoresis, fatigue or fever  HEENT: No congestion, sore throat  No visual complaints  Respiratory: No cough, chest tightness, shortness of breath and wheezing  Cardiovascular: No chest pain and palpitations  No Syncope or grey out  GI: Negative for abdominal distention, pain, constipation, diarrhea or nausea  Endocrine: Negative for cold or heat intolerance, polydipsia and polyuria  Genitourinary: Negative for decreased urine volume, dysuria, flank pain and urgency  Skin: Mild rash/ itching on the back present  Neurological: Negative for dizziness, weakness, numbness and headaches  Hematological: Negative for spontaneous bruising or bleeding  History of chronic lymphocytic leukemia with elevated white count in the background  Psychiatric: Negative for confusion, dysphoric mood, hallucinations  All other systems reviewed and are negative  OBJECTIVE:     Vitals:     HR:  [60-69] 64  Resp:  [16-18] 16  BP: (105-168)/(56-81) 150/74  SpO2:  [94 %-97 %] 97 %  Temp (24hrs), Av 7 °F (37 1 °C), Min:97 8 °F (36 6 °C), Max:100 2 °F (37 9 °C)  Current: Temperature: 99 5 °F (37 5 °C)    Intake/Output Summary (Last 24 hours) at 17 1410  Last data filed at 17 1300   Gross per 24 hour   Intake           2902 5 ml   Output             3450 ml   Net           -547 5 ml     Body mass index is 27 37 kg/m²  Physical Exam    General Appearance:  Awake,alert, cooperative  Not in distress  Pallor / Icterus/ Cyanosis negative  Oropharynx no thrush  Overweight and obese  Tongue protrudes in midline  Head:  Normocephalic, atraumatic  No titubations  Eyes:  No eye redness or discharge bilaterally  Neck: Supple, no raised JVP  Back:   Symmetric, no kypho-scoliosis  Lungs:   B/L Clear to auscultation, no wheezing or rhonchi  Normal broncho-alveolar air entry  No pleural rubs     Heart:   Regular S1S2, A2P2 normal, no murmur or gallop  Abdomen:   Soft, non-tender,no rebound, bowel sounds+  Musculoskeletal: Extremities no cyanosis or edema  Surgical site on the operated right hip has clean dressing  No discharge or drainage  Mild diminution and limited range of motion   Psych: Normal Affect / No hallucinations or delusions  Neurologic:               Skin:  Endocrine:  Vascular: Awake and alert  Mentation intact  Speech is intact  Facial symmetry is maintained  Good shoulder shrug and hand grasp  Muscle strength is 5/5 in all muscle groups except on operated right lower limb which is limited due to recent right hip surgery  Light touch and temperature sensation is maintained  Mild pruritic rash on back /no purpura  No open wounds  No thyromegaly / no lid lag  Homans sign is negative  B/L Calf are supple and non tender         Labs, Imaging, & Other studies:    All pertinent labs and imaging studies were personally reviewed                Current Meds:  Current Facility-Administered Medications   Medication Dose Route Frequency Provider Last Rate Last Dose    acetaminophen (TYLENOL) tablet 650 mg  650 mg Oral Q6H PRN Parvin Eden PA-C        amLODIPine (NORVASC) tablet 5 mg  5 mg Oral QPM Darshan Ovalle MD   5 mg at 09/29/17 1815    aspirin chewable tablet 81 mg  81 mg Oral BID Parvin Eden PA-C   81 mg at 09/30/17 1499    calcium carbonate (TUMS) chewable tablet 1,000 mg  1,000 mg Oral Daily PRN Parvin Eden PA-C        carvedilol (COREG) tablet 12 5 mg  12 5 mg Oral BID With Meals Darshan Ovalle MD   12 5 mg at 09/30/17 0824    diphenhydrAMINE (BENADRYL) tablet 25 mg  25 mg Oral Q6H PRN Darshan Ovalle MD        HYDROmorphone (DILAUDID) 1 mg/mL injection 0 2 mg  0 2 mg Intravenous Q3H PRN Parvin Eden PA-C        lactated ringers infusion  125 mL/hr Intravenous Continuous Parvin Eden PA-C   Stopped at 09/30/17 0801    lisinopril (ZESTRIL) tablet 40 mg  40 mg Oral Daily Darshan Ovalle MD   40 mg at 09/30/17 0823    minoxidil (LONITEN) tablet 10 mg  10 mg Oral Daily Steve Crisostomo MD   10 mg at 09/30/17 0824    multivitamin-minerals (CENTRUM) tablet 1 tablet  1 tablet Oral Daily Bebe Finn PA-C   1 tablet at 09/30/17 0824    ondansetron (ZOFRAN) injection 4 mg  4 mg Intravenous Q6H PRN Bebe Finn PA-C        oxyCODONE (ROXICODONE) immediate release tablet 10 mg  10 mg Oral Q4H PRN Bebe Finn PA-C   10 mg at 09/30/17 1255    oxyCODONE (ROXICODONE) IR tablet 5 mg  5 mg Oral Q4H PRN Bebe Finn PA-C        oxyCODONE-acetaminophen (PERCOCET) 5-325 mg per tablet 2 tablet  2 tablet Oral Q6H PRN Catr[place]ito Pines, DO   2 tablet at 09/30/17 0648    pantoprazole (PROTONIX) EC tablet 40 mg  40 mg Oral BID AC Steve Crisostomo MD   40 mg at 09/30/17 7814    polyethylene glycol (MIRALAX) packet 17 g  17 g Oral Daily Steve Crisostomo MD        pravastatin (PRAVACHOL) tablet 10 mg  10 mg Oral Daily With Camron Arellano MD        [facility]) tablet 8 6 mg  1 tablet Oral Daily Bebe Finn PA-C   8 6 mg at 09/30/17 1489    sertraline (ZOLOFT) tablet 50 mg  50 mg Oral Daily Steve Crisostomo MD   50 mg at 09/30/17 1084    sodium chloride 0 9 % infusion  125 mL/hr Intravenous Continuous [place] Gonsalves, DO   Stopped at 09/29/17 1014    tamsulosin (FLOMAX) capsule 0 4 mg  0 4 mg Oral Daily With Camron Arellano MD   0 4 mg at 09/29/17 1635    traMADol (ULTRAM) tablet 50 mg  50 mg Oral Q6H PRN Bebe Finn PA-C        triamcinolone (KENALOG) 0 5 % cream   Topical BID Steve Crisostomo MD        zolpidem [facility]) tablet 5 mg  5 mg Oral HS PRN Delphy Defalcis, DO   5 mg at 09/30/17 0129     Home Meds:  Prescriptions Prior to Admission   Medication    amLODIPine (NORVASC) 5 mg tablet    carvedilol (COREG) 12 5 mg tablet    lisinopril (ZESTRIL) 40 mg tablet    minoxidil (LONITEN) 10 mg tablet    omeprazole (PriLOSEC) 40 MG capsule    sertraline (ZOLOFT) 100 mg tablet    simvastatin (ZOCOR) 20 mg tablet  tamsulosin (FLOMAX) 0 4 mg    zolpidem (AMBIEN) 5 mg tablet    aspirin (ECOTRIN LOW STRENGTH) 81 mg EC tablet    milk thistle 175 MG tablet    naproxen (NAPROSYN) 500 mg tablet    Omega-3 Fatty Acids (FISH OIL) 1000 MG CPDR       Continuous Infusions:    lactated ringers 125 mL/hr Last Rate: Stopped (09/30/17 0801)   sodium chloride 125 mL/hr Last Rate: Stopped (09/29/17 1014)       Invasive Devices     Peripheral Intravenous Line            Peripheral IV 09/29/17 Left Hand 1 day                VTE Pharmacologic Prophylaxis:  as per Primary Ortho Team   VTE Mechanical Prophylaxis: sequential compression device    Portions of the record may have been created with voice recognition software  Occasional wrong word or "sound a like" substitutions may have occurred due to the inherent limitations of voice recognition software  Read the chart carefully and recognize, using context, where substitutions have occurred

## 2017-09-30 NOTE — PLAN OF CARE
Problem: PHYSICAL THERAPY ADULT  Goal: Performs mobility at highest level of function for planned discharge setting  See evaluation for individualized goals  Treatment/Interventions: Functional transfer training, LE strengthening/ROM, Elevations, Therapeutic exercise, Endurance training, Patient/family training, Equipment eval/education, Bed mobility, Gait training, Spoke to nursing          See flowsheet documentation for full assessment, interventions and recommendations  Outcome: Progressing  Prognosis: Good  Problem List: Decreased strength, Decreased endurance, Decreased range of motion, Impaired balance, Decreased mobility, Orthopedic restrictions, Pain  Assessment: PT  supine in bed upon my arrival  Required A with leg lift with bed mobility transfers this PM session  Progressed with amb  trial with use of RW and standbyA of therapist  Limited due to increased pain this PM  Performance of stair training requiring Xu of therapist for completion, with cueing required for proper technique  Returned to room and repositioned supine in bed, where HEP was performed  Remained supine in bed with ice applied at end of treatment session  Pt  will continue to progress with PT goals to ensure a safe d/c home with Home PT and family support when medically stable  Barriers to Discharge: Inaccessible home environment  Barriers to Discharge Comments: CHRISTOPHER  Recommendation: Home PT, Home with family support     PT - OK to Discharge: No (Continued progression of PT goals )    See flowsheet documentation for full assessment

## 2017-09-30 NOTE — PHYSICAL THERAPY NOTE
Physical Therapy Progress Note     09/30/17 1458   Pain Assessment   Pain Assessment 0-10   Pain Score 6   Pain Type Surgical pain   Pain Location Hip   Pain Orientation Right   Hospital Pain Intervention(s) Ambulation/increased activity;Repositioned;Cold applied   Response to Interventions Tolerated  Restrictions/Precautions   Weight Bearing Precautions Per Order Yes   RLE Weight Bearing Per Order WBAT   Other Precautions Fall Risk;Pain   General   Chart Reviewed Yes   Response to Previous Treatment Patient with no complaints from previous session  Family/Caregiver Present No   Subjective   Subjective Willing to participate in therapy this PM    Bed Mobility   Supine to Sit 5  Supervision   Additional items Assist x 1;HOB elevated; Bedrails; Increased time required;Verbal cues;LE management   Sit to Supine 4  Minimal assistance   Additional items Assist x 1;Bedrails; Increased time required;Verbal cues;LE management;Leg    Transfers   Sit to Stand 5  Supervision   Additional items Assist x 1;Bedrails; Increased time required;Verbal cues   Stand to Sit 5  Supervision   Additional items Assist x 1;Bedrails; Increased time required;Verbal cues   Ambulation/Elevation   Gait pattern Decreased foot clearance; Forward Flexion; Antalgic;Decreased R stance; Short stride; Excessively slow   Gait Assistance 5  Supervision   Additional items Assist x 1;Verbal cues; Tactile cues   Assistive Device Bariatric Rolling walker   Distance 60'   Stair Management Assistance 4  Minimal assist   Additional items Assist x 1;Verbal cues; Tactile cues; Increased time required   Stair Management Technique Step to pattern; Foreward;Nonreciprocal;With cane; One rail R   Number of Stairs 5   Balance   Static Sitting Normal   Dynamic Sitting Good   Static Standing Fair   Dynamic Standing Fair   Ambulatory Fair   Endurance Deficit   Endurance Deficit Yes   Endurance Deficit Description fatigue/pain   Activity Tolerance   Activity Tolerance Patient limited by fatigue;Patient limited by pain   Nurse Made Aware Yes   Exercises   THR Supine;10 reps;AAROM; Bilateral   Assessment   Prognosis Good   Problem List Decreased strength;Decreased endurance;Decreased range of motion; Impaired balance;Decreased mobility;Orthopedic restrictions;Pain   Assessment PT  supine in bed upon my arrival  Required A with leg lift with bed mobility transfers this PM session  Progressed with amb  trial with use of RW and standbyA of therapist  Limited due to increased pain this PM  Performance of stair training requiring Xu of therapist for completion, with cueing required for proper technique  Returned to room and repositioned supine in bed, where HEP was performed  Remained supine in bed with ice applied at end of treatment session  Pt  will continue to progress with PT goals to ensure a safe d/c home with Home PT and family support when medically stable  Goals   Patient Goals To go home  STG Expiration Date 10/03/17   Treatment Day 2   Plan   Treatment/Interventions Functional transfer training;LE strengthening/ROM; Elevations; Therapeutic exercise; Endurance training;Bed mobility;Gait training;Spoke to nursing;Spoke to case management; Family   Progress Slow progress, decreased activity tolerance   PT Frequency Twice a day   Recommendation   Recommendation Home PT; Home with family support   PT - OK to Discharge No  (Continued progression of PT goals )     Micheal Izquierdo PTA

## 2017-09-30 NOTE — PLAN OF CARE

## 2017-10-01 LAB
BASOPHILS # BLD AUTO: 0.02 THOUSANDS/ΜL (ref 0–0.1)
BASOPHILS NFR BLD AUTO: 0 % (ref 0–1)
EOSINOPHIL # BLD AUTO: 0.21 THOUSAND/ΜL (ref 0–0.61)
EOSINOPHIL NFR BLD AUTO: 1 % (ref 0–6)
ERYTHROCYTE [DISTWIDTH] IN BLOOD BY AUTOMATED COUNT: 15.5 % (ref 11.6–15.1)
HCT VFR BLD AUTO: 30.1 % (ref 36.5–49.3)
HGB BLD-MCNC: 9.8 G/DL (ref 12–17)
LYMPHOCYTES # BLD AUTO: 11.56 THOUSANDS/ΜL (ref 0.6–4.47)
LYMPHOCYTES NFR BLD AUTO: 74 % (ref 14–44)
MCH RBC QN AUTO: 30.9 PG (ref 26.8–34.3)
MCHC RBC AUTO-ENTMCNC: 32.6 G/DL (ref 31.4–37.4)
MCV RBC AUTO: 95 FL (ref 82–98)
MONOCYTES # BLD AUTO: 0.43 THOUSAND/ΜL (ref 0.17–1.22)
MONOCYTES NFR BLD AUTO: 3 % (ref 4–12)
NEUTROPHILS # BLD AUTO: 3.42 THOUSANDS/ΜL (ref 1.85–7.62)
NEUTS SEG NFR BLD AUTO: 22 % (ref 43–75)
PLATELET # BLD AUTO: 147 THOUSANDS/UL (ref 149–390)
PMV BLD AUTO: 9.9 FL (ref 8.9–12.7)
RBC # BLD AUTO: 3.17 MILLION/UL (ref 3.88–5.62)
WBC # BLD AUTO: 15.64 THOUSAND/UL (ref 4.31–10.16)

## 2017-10-01 PROCEDURE — 97116 GAIT TRAINING THERAPY: CPT

## 2017-10-01 PROCEDURE — 85025 COMPLETE CBC W/AUTO DIFF WBC: CPT | Performed by: INTERNAL MEDICINE

## 2017-10-01 PROCEDURE — 97110 THERAPEUTIC EXERCISES: CPT

## 2017-10-01 RX ADMIN — TAMSULOSIN HYDROCHLORIDE 0.4 MG: 0.4 CAPSULE ORAL at 15:47

## 2017-10-01 RX ADMIN — TRAMADOL HYDROCHLORIDE 50 MG: 50 TABLET ORAL at 06:03

## 2017-10-01 RX ADMIN — DIPHENHYDRAMINE HCL 25 MG: 25 TABLET ORAL at 08:31

## 2017-10-01 RX ADMIN — TRIAMCINOLONE ACETONIDE: 5 CREAM TOPICAL at 18:30

## 2017-10-01 RX ADMIN — ASPIRIN 81 MG 81 MG: 81 TABLET ORAL at 17:13

## 2017-10-01 RX ADMIN — LISINOPRIL 40 MG: 20 TABLET ORAL at 08:26

## 2017-10-01 RX ADMIN — TRAMADOL HYDROCHLORIDE 50 MG: 50 TABLET ORAL at 12:18

## 2017-10-01 RX ADMIN — SENNOSIDES 8.6 MG: 8.6 TABLET, FILM COATED ORAL at 08:26

## 2017-10-01 RX ADMIN — CARVEDILOL 12.5 MG: 12.5 TABLET, FILM COATED ORAL at 15:47

## 2017-10-01 RX ADMIN — OXYCODONE HYDROCHLORIDE 5 MG: 5 TABLET ORAL at 21:36

## 2017-10-01 RX ADMIN — TRIAMCINOLONE ACETONIDE: 5 CREAM TOPICAL at 08:26

## 2017-10-01 RX ADMIN — PANTOPRAZOLE SODIUM 40 MG: 40 TABLET, DELAYED RELEASE ORAL at 15:47

## 2017-10-01 RX ADMIN — DIPHENHYDRAMINE HCL 25 MG: 25 TABLET ORAL at 23:33

## 2017-10-01 RX ADMIN — PRAVASTATIN SODIUM 10 MG: 20 TABLET ORAL at 15:48

## 2017-10-01 RX ADMIN — OXYCODONE HYDROCHLORIDE 10 MG: 10 TABLET ORAL at 07:55

## 2017-10-01 RX ADMIN — PANTOPRAZOLE SODIUM 40 MG: 40 TABLET, DELAYED RELEASE ORAL at 06:03

## 2017-10-01 RX ADMIN — ZOLPIDEM TARTRATE 5 MG: 5 TABLET, FILM COATED ORAL at 20:17

## 2017-10-01 RX ADMIN — Medication 1 TABLET: at 08:26

## 2017-10-01 RX ADMIN — DIPHENHYDRAMINE HCL 25 MG: 25 TABLET ORAL at 02:45

## 2017-10-01 RX ADMIN — CARVEDILOL 12.5 MG: 12.5 TABLET, FILM COATED ORAL at 08:26

## 2017-10-01 RX ADMIN — MINOXIDIL 10 MG: 10 TABLET ORAL at 08:26

## 2017-10-01 RX ADMIN — SERTRALINE HYDROCHLORIDE 50 MG: 50 TABLET ORAL at 08:27

## 2017-10-01 RX ADMIN — NALBUPHINE HYDROCHLORIDE 2 MG: 10 INJECTION, SOLUTION INTRAMUSCULAR; INTRAVENOUS; SUBCUTANEOUS at 00:28

## 2017-10-01 RX ADMIN — NALBUPHINE HYDROCHLORIDE 2 MG: 10 INJECTION, SOLUTION INTRAMUSCULAR; INTRAVENOUS; SUBCUTANEOUS at 06:03

## 2017-10-01 RX ADMIN — OXYCODONE HYDROCHLORIDE AND ACETAMINOPHEN 2 TABLET: 5; 325 TABLET ORAL at 02:44

## 2017-10-01 RX ADMIN — TRAMADOL HYDROCHLORIDE 50 MG: 50 TABLET ORAL at 19:04

## 2017-10-01 RX ADMIN — DIPHENHYDRAMINE HCL 25 MG: 25 TABLET ORAL at 14:42

## 2017-10-01 RX ADMIN — ASPIRIN 81 MG 81 MG: 81 TABLET ORAL at 08:26

## 2017-10-01 RX ADMIN — OXYCODONE HYDROCHLORIDE 5 MG: 5 TABLET ORAL at 16:29

## 2017-10-01 RX ADMIN — AMLODIPINE BESYLATE 5 MG: 5 TABLET ORAL at 17:13

## 2017-10-01 RX ADMIN — POLYETHYLENE GLYCOL 3350 17 G: 17 POWDER, FOR SOLUTION ORAL at 08:27

## 2017-10-01 RX ADMIN — OXYCODONE HYDROCHLORIDE 10 MG: 10 TABLET ORAL at 12:18

## 2017-10-01 NOTE — PHYSICAL THERAPY NOTE
Physical Therapy Progress Note     10/01/17 0955   Pain Assessment   Pain Assessment 0-10   Pain Score 4   Pain Type Surgical pain   Pain Location Hip   Pain Orientation Right   Hospital Pain Intervention(s) Repositioned; Ambulation/increased activity; Emotional support   Response to Interventions Good  Restrictions/Precautions   Other Precautions Pain   Subjective   Subjective The pt  states that he is doing better ,and he is ready to go for a walk  Bed Mobility   Supine to Sit 6  Modified independent   Additional items Increased time required   Transfers   Sit to Stand 6  Modified independent   Stand to Sit 6  Modified independent   Ambulation/Elevation   Gait pattern Excessively slow;Decreased foot clearance; Antalgic   Gait Assistance 5  Supervision   Additional items Verbal cues   Assistive Device Rolling walker   Distance 240 feet x 2  Stair Management Assistance 5  Supervision   Additional items Verbal cues   Stair Management Technique One rail R;Step to pattern; Foreward;Nonreciprocal   Number of Stairs 10   Balance   Static Sitting Normal   Dynamic Sitting Normal   Static Standing Good   Ambulatory Fair +   Activity Tolerance   Activity Tolerance Patient tolerated treatment well   Nurse [address] [place] [address],   Exercises   THR Supine;Bilateral;10 reps;AROM  (x2 sets )   Assessment   Prognosis Good   Problem List Decreased strength;Decreased endurance;Decreased range of motion; Impaired balance;Decreased mobility;Orthopedic restrictions;Pain   Assessment The pt  has much improved mobility as he is ambulating beyond community distances without any losses of balance  He was able to perform dynamic head and trunk movements without difficulty as well  He was able to perform a flight of stairs without assistance  He was able to explain his home exercise program to the therapist and complete it without assistance   He is able to return home as he has demonstrated all of the necessary mobility in order to do so safely  Barriers to Discharge None   Goals   Patient Goals To go home tomorrow  STG Expiration Date 10/03/17   Treatment Day 3   Plan   Treatment/Interventions Functional transfer training;LE strengthening/ROM; Elevations; Therapeutic exercise;Patient/family training; Endurance training;Bed mobility;Gait training   Progress Improving as expected   PT Frequency Twice a day   Recommendation   Recommendation Home PT; Home with family support   Equipment Recommended Ariella Rhodes   PT - OK to Discharge Yes     Gabriella Schlatter, PTA

## 2017-10-01 NOTE — PLAN OF CARE
Problem: PHYSICAL THERAPY ADULT  Goal: Performs mobility at highest level of function for planned discharge setting  See evaluation for individualized goals  Treatment/Interventions: Functional transfer training, LE strengthening/ROM, Elevations, Therapeutic exercise, Endurance training, Patient/family training, Equipment eval/education, Bed mobility, Gait training, Spoke to nursing          See flowsheet documentation for full assessment, interventions and recommendations  Outcome: Adequate for Discharge  Prognosis: Good  Problem List: Decreased strength, Decreased endurance, Decreased range of motion, Impaired balance, Decreased mobility, Orthopedic restrictions, Pain  Assessment: The pt  was fatigued from this morning, but he was able to ambulate beyond community distances  His wife was present throughout and was instructed in proper techniques as well  He did have improved gait velocity this afternoon as well  He is able to return home at discharge  Barriers to Discharge: None  Barriers to Discharge Comments: CHRISTOPHER  Recommendation: Home PT, Home with family support     PT - OK to Discharge: Yes    See flowsheet documentation for full assessment

## 2017-10-01 NOTE — PROGRESS NOTES
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  Ambulatory dysfunction  Hypertension  Benign prostatic hypertrophy  DJD lumbar spine  Chronic lymphocytic leukemia, followed by Dr Slaughter Stage  History of depression currently stable  Pulmonary hypertension @ 35 mm of Hg - echo 9/18/2017  History of prolonged QTC interval    Pruritus generalized  Recommendation:  · Patient is hemodynamically stable as evident on the flow sheets  Patients  pain is well controlled with current analgesics  · Medically stable and doing well  Has mild pruritus and may use PRN oral benadryl  · Continue DVT prophylaxis with ASA and Compression boots  · Monitor for any redness/ drainage / swelling around site of surgery  · Recommend life style modifications and any high impact activities  · Continue PT /OT to improve endurance, range of motion, gait stabilization and use of assist device in a safe manner  · Follow up with Hematology for CLL  · Full fall and orthostatic precautions  Subjective:   Patient seen and examined today  EMR and overnight events reviewed  Patient is resting comfortably and watching football  His wife is in the room  He states he has been having mild itching on his abdomen and his back  But he does not have any rash  He stated he can use hydrocortisone cream to minimize his symptoms  His pain is well controlled and is not having any discomfort in his operated right leg  He is able to move his limbs without much difficulty and wishes to go home tomorrow  Denies any tremors or any symptoms suggestive of any withdrawal     Review of Systems     Constitutional: No fatigue or fever  HEENT: No congestion, sore throat  Respiratory: No shortness of breath and wheezing      Cardiovascular: No chest pain and palpitations  GI: Negative for constipation, diarrhea or nausea  Genitourinary: Negative for dysuria, flank pain and urgency  Skin: Negative for rash  Generalized mild + pruritus  Neurological: Negative for dizziness, weakness, numbness and headaches  Hematological: Negative for spontaneous bruising or bleeding  Psychiatric: Negative for confusion, dysphoric mood, hallucinations  All other systems reviewed and are negative  OBJECTIVE:     Vitals:     HR:  [59-68] 64  Resp:  [16-18] 18  BP: (121-177)/(65-83) 177/82  SpO2:  [93 %-98 %] 98 %  Temp (24hrs), Av °F (37 2 °C), Min:97 °F (36 1 °C), Max:100 7 °F (38 2 °C)  Current: Temperature: 98 7 °F (37 1 °C)    Intake/Output Summary (Last 24 hours) at 10/01/17 1149  Last data filed at 10/01/17 1001   Gross per 24 hour   Intake              240 ml   Output             1425 ml   Net            -1185 ml     Body mass index is 27 37 kg/m²  Physical Exam    General Appearance:  Awake,alert, cooperative  Not in distress  Head:  Normocephalic, atraumatic  Eyes:  No eye redness or discharge bilaterally  Neck: no raised JVP  Back:   Symmetric, no kypho-scoliosis  No rash  Lungs:   B/L Clear to auscultation, no wheezing  Heart:   Regular S1S2,  no murmur or gallop  Abdomen:   Soft, non-tender,no rebound, bowel sounds+  Musculoskeletal: Extremities no edema  Right hip has clean dressing without any discharge or drainage  Mild diminution to flexion +   Psych: Normal Affect   Neurologic:           Skin:  Endocrine:  Vascular: Awake and alert  Mentation intact  Speech is intact  Facial symmetry is maintained  Muscle strength is 5/5 in all muscle groups except on operated limb which is limited due to recent surgery  Light touch and temperature sensation is maintained  No rashes /purpura  No open wounds  No thyromegaly / no lid lag  Homans sign is negative          Labs, Imaging, & Other studies:    All pertinent labs and imaging studies were personally reviewed    Results from last 7 days  Lab Units 10/01/17  0603   WBC Thousand/uL 15 64*   HEMOGLOBIN g/dL 9 8*   PLATELETS Thousands/uL 147*       Results from last 7 days  Lab Units 09/30/17  1608   SODIUM mmol/L 136   POTASSIUM mmol/L 3 7   CHLORIDE mmol/L 102   CO2 mmol/L 29   ANION GAP mmol/L 5   BUN mg/dL 7   CREATININE mg/dL 0 75   EGFR ml/min/1 73sq m 96   GLUCOSE RANDOM mg/dL 118   CALCIUM mg/dL 7 9*           Current Meds:  Current Facility-Administered Medications   Medication Dose Route Frequency Provider Last Rate Last Dose    acetaminophen (TYLENOL) tablet 650 mg  650 mg Oral Q6H PRN GIAN NeilC   650 mg at 09/30/17 1728    amLODIPine (NORVASC) tablet 5 mg  5 mg Oral QPM Juarez Springer MD   5 mg at 09/30/17 1719    aspirin chewable tablet 81 mg  81 mg Oral BID GIAN NeilC   81 mg at 10/01/17 9027    calcium carbonate (TUMS) chewable tablet 1,000 mg  1,000 mg Oral Daily PRN Bebe Martell PA-C        carvedilol (COREG) tablet 12 5 mg  12 5 mg Oral BID With Meals Juarez Springer MD   12 5 mg at 10/01/17 0826    diphenhydrAMINE (BENADRYL) tablet 25 mg  25 mg Oral Q6H PRN Juarez Springer MD   25 mg at 10/01/17 0831    HYDROmorphone (DILAUDID) 1 mg/mL injection 0 2 mg  0 2 mg Intravenous Q3H PRN GIAN NeilC   0 2 mg at 09/30/17 1810    lactated ringers infusion  125 mL/hr Intravenous Continuous JESUSITA Neil-C   Stopped at 09/30/17 0801    lisinopril (ZESTRIL) tablet 40 mg  40 mg Oral Daily Juarez Springer MD   40 mg at 10/01/17 0826    minoxidil (LONITEN) tablet 10 mg  10 mg Oral Daily Juarez Springer MD   10 mg at 10/01/17 0826    multivitamin-minerals (CENTRUM) tablet 1 tablet  1 tablet Oral Daily JESUSITA Neil-C   1 tablet at 10/01/17 0826    nalbuphine (NUBAIN) injection 2 mg  2 mg Intravenous Q3H PRN MATHEW Reyes   2 mg at 10/01/17 0603    ondansetron (ZOFRAN) injection 4 mg  4 mg Intravenous Q6H PRN Bebe Martell PA-C        oxyCODONE (ROXICODONE) immediate release tablet 10 mg  10 mg Oral Q4H PRN Esvin Lang PA-C   10 mg at 10/01/17 0755    oxyCODONE (ROXICODONE) IR tablet 5 mg  5 mg Oral Q4H PRN Esvin Lang PA-C        oxyCODONE-acetaminophen (PERCOCET) 5-325 mg per tablet 2 tablet  2 tablet Oral Q6H PRN Annie Card DO   2 tablet at 10/01/17 0244    pantoprazole (PROTONIX) EC tablet 40 mg  40 mg Oral BID AC Scarlett Mcfarlane MD   40 mg at 10/01/17 0603    polyethylene glycol (MIRALAX) packet 17 g  17 g Oral Daily Scarlett Mcfarlane MD   17 g at 10/01/17 0827    pravastatin (PRAVACHOL) tablet 10 mg  10 mg Oral Daily With Franny Marie MD   10 mg at 09/30/17 1543    senna (SENOKOT) tablet 8 6 mg  1 tablet Oral Daily Esvin Lang PA-C   8 6 mg at 10/01/17 8312    sertraline (ZOLOFT) tablet 50 mg  50 mg Oral Daily Scarlett Mcfarlane MD   50 mg at 10/01/17 0827    sodium chloride 0 9 % infusion  125 mL/hr Intravenous Continuous Linsunita Jeronimo DO   Stopped at 09/29/17 1014    tamsulosin (FLOMAX) capsule 0 4 mg  0 4 mg Oral Daily With Franny Marie MD   0 4 mg at 09/30/17 1545    traMADol (ULTRAM) tablet 50 mg  50 mg Oral Q6H PRN Esvin Lang PA-C   50 mg at 10/01/17 0603    triamcinolone (KENALOG) 0 5 % cream   Topical BID Scarlett Mcfarlane MD        zolpidem [facility]) tablet 5 mg  5 mg Oral HS PRN Ariadne aBlderas DO   5 mg at 09/30/17 2306     Home Meds:  Prescriptions Prior to Admission   Medication    amLODIPine (NORVASC) 5 mg tablet    carvedilol (COREG) 12 5 mg tablet    lisinopril (ZESTRIL) 40 mg tablet    minoxidil (LONITEN) 10 mg tablet    omeprazole (PriLOSEC) 40 MG capsule    sertraline (ZOLOFT) 100 mg tablet    simvastatin (ZOCOR) 20 mg tablet    tamsulosin (FLOMAX) 0 4 mg    zolpidem (AMBIEN) 5 mg tablet    aspirin (ECOTRIN LOW STRENGTH) 81 mg EC tablet    milk thistle 175 MG tablet    naproxen (NAPROSYN) 500 mg tablet    Omega-3 Fatty Acids (FISH OIL) 1000 MG CPDR       Continuous Infusions:    lactated ringers 125 mL/hr Last Rate: Stopped (09/30/17 0801)   sodium chloride 125 mL/hr Last Rate: Stopped (09/29/17 1014)       Invasive Devices     Peripheral Intravenous Line            Peripheral IV 09/29/17 Left Hand 2 days                VTE Pharmacologic Prophylaxis:  as per Primary Ortho Team   VTE Mechanical Prophylaxis: sequential compression device    Portions of the record may have been created with voice recognition software  Occasional wrong word or "sound a like" substitutions may have occurred due to the inherent limitations of voice recognition software  Read the chart carefully and recognize, using context, where substitutions have occurred

## 2017-10-01 NOTE — PLAN OF CARE
Problem: PHYSICAL THERAPY ADULT  Goal: Performs mobility at highest level of function for planned discharge setting  See evaluation for individualized goals  Treatment/Interventions: Functional transfer training, LE strengthening/ROM, Elevations, Therapeutic exercise, Endurance training, Patient/family training, Equipment eval/education, Bed mobility, Gait training, Spoke to nursing          See flowsheet documentation for full assessment, interventions and recommendations  Outcome: Adequate for Discharge  Prognosis: Good  Problem List: Decreased strength, Decreased endurance, Decreased range of motion, Impaired balance, Decreased mobility, Orthopedic restrictions, Pain  Assessment: The pt  has much improved mobility as he is ambulating beyond community distances without any losses of balance  He was able to perform dynamic head and trunk movements without difficulty as well  He was able to perform a flight of stairs without assistance  He was able to explain his home exercise program to the therapist and complete it without assistance  He is able to return home as he has demonstrated all of the necessary mobility in order to do so safely  Barriers to Discharge: None  Barriers to Discharge Comments: CHRISTOPHER  Recommendation: Home PT, Home with family support     PT - OK to Discharge: Yes    See flowsheet documentation for full assessment

## 2017-10-01 NOTE — PROGRESS NOTES
Orthopedic Total Hip Progress Note    ASSESSMENT & PLAN:  Status post- RIGHT total hip arthroplasty: Doing well postoperatively  Pain Relief: Patient doing well with pain medications  Comfortable  Has been out of bed and to bathroom on own  Activity: Patient may be WBAT  Getting up to bathroom by self  Weight Bearing: Weight bearing as tollerated     LOS: 2 days     SUBJECTIVE:      Systemic or Specific Complaints: No Complaints    OBJECTIVE:  Vital signs in last 24 hours:  Temp:  [97 °F (36 1 °C)-100 7 °F (38 2 °C)] 98 7 °F (37 1 °C)  HR:  [59-68] 64  Resp:  [16-18] 18  BP: (121-177)/(65-83) 177/82  General: alert, appears stated age and cooperative   Neurovascular: Tibial nerve: Intact, Superficial peroneal nerve: Intact and Deep peroneal nerve: Intact  Dorsalis pedis pulse: 2+, Posterior tibial pulse: 2+ and Capillary refill: Normal   Wound: No Erythema   Range of Motion: Normal and As expected post THR   DVT Exam: Negative Morris's sign  No cords or calf tenderness  No significant calf/ankle edema  Data Review  CBC:   Lab Results   Component Value Date    WBC 15 64 (H) 10/01/2017    WBC 22 68 (H) 07/20/2015    RBC 3 17 (L) 10/01/2017    RBC 4 14 07/20/2015    HGB 9 8 (L) 10/01/2017    HGB 10 7 (L) 07/20/2015    HCT 30 1 (L) 10/01/2017    HCT 33 0 (L) 07/20/2015     (L) 10/01/2017     07/20/2015       Plan: Mobilize with PT / OT  DVT prophylaxis - ASA 81  mg BID x 6 weeks  D/C Planning for Disposition - Plan to d/c home today if doing well  Plan to start PT at home  WBAT  ?     Stefan Baez PA-C  Date: 10/1/2017  Time: 7:43 AM

## 2017-10-01 NOTE — PLAN OF CARE
Problem: Potential for Falls  Goal: Patient will remain free of falls  INTERVENTIONS:  - Assess patient frequently for physical needs  -  Identify cognitive and physical deficits and behaviors that affect risk of falls    -  Delia fall precautions as indicated by assessment   - Educate patient/family on patient safety including physical limitations  - Instruct patient to call for assistance with activity based on assessment  - Modify environment to reduce risk of injury  - Consider OT/PT consult to assist with strengthening/mobility   Outcome: Progressing

## 2017-10-01 NOTE — PHYSICAL THERAPY NOTE
Physical Therapy Progress Note     10/01/17 1415   Pain Assessment   Pain Assessment 0-10   Pain Score 4   Pain Type Surgical pain   Pain Location Hip   Pain Orientation Right   Hospital Pain Intervention(s) Repositioned; Ambulation/increased activity   Response to Interventions Tolerated, satisfied  Restrictions/Precautions   RLE Weight Bearing Per Order WBAT   Other Precautions Pain   Subjective   Subjective The pt  states that he would like to try and walk around the entire unit this afternoon  Bed Mobility   Supine to Sit 5  Supervision   Additional items Increased time required;LE management   Sit to Supine 5  Supervision   Additional items Increased time required;LE management   Ambulation/Elevation   Gait pattern Inconsistent jay; Antalgic   Gait Assistance 5  Supervision   Additional items Verbal cues   Assistive Device Rolling walker   Distance 420 feet  Stair Management Assistance 5  Supervision   Additional items Verbal cues   Stair Management Technique Two rails; Step to pattern; Foreward;Nonreciprocal   Number of Stairs 10   Balance   Static Sitting Normal   Dynamic Sitting Normal   Static Standing Good   Ambulatory Fair   Activity Tolerance   Activity Tolerance Patient tolerated treatment well   Nurse [facility], RN  Exercises   THR Standing;Bilateral;10 reps  (x2 sets )   Assessment   Prognosis Good   Problem List Decreased strength;Decreased endurance;Decreased range of motion; Impaired balance;Decreased mobility;Orthopedic restrictions;Pain   Assessment The pt  was fatigued from this morning, but he was able to ambulate beyond community distances  His wife was present throughout and was instructed in proper techniques as well  He did have improved gait velocity this afternoon as well  He is able to return home at discharge  Barriers to Discharge None   Goals   Patient Goals To go home tomorrow     STG Expiration Date 10/03/17   Treatment Day 4   Plan   Treatment/Interventions Functional transfer training;LE strengthening/ROM; Elevations; Therapeutic exercise; Endurance training;Patient/family training;Bed mobility;Gait training   Progress Improving as expected   PT Frequency Twice a day   Recommendation   Recommendation Home PT; Home with family support   Equipment Recommended Lisbet Millard   PT - OK to Discharge Yes     Tootie Flores, PTA

## 2017-10-02 VITALS
SYSTOLIC BLOOD PRESSURE: 153 MMHG | DIASTOLIC BLOOD PRESSURE: 93 MMHG | HEIGHT: 68 IN | HEART RATE: 66 BPM | BODY MASS INDEX: 27.28 KG/M2 | WEIGHT: 180 LBS | OXYGEN SATURATION: 99 % | TEMPERATURE: 100.1 F | RESPIRATION RATE: 18 BRPM

## 2017-10-02 PROCEDURE — 97110 THERAPEUTIC EXERCISES: CPT

## 2017-10-02 PROCEDURE — 97116 GAIT TRAINING THERAPY: CPT

## 2017-10-02 PROCEDURE — 97166 OT EVAL MOD COMPLEX 45 MIN: CPT

## 2017-10-02 PROCEDURE — G8989 SELF CARE D/C STATUS: HCPCS

## 2017-10-02 PROCEDURE — G8988 SELF CARE GOAL STATUS: HCPCS

## 2017-10-02 PROCEDURE — 97530 THERAPEUTIC ACTIVITIES: CPT

## 2017-10-02 PROCEDURE — G8987 SELF CARE CURRENT STATUS: HCPCS

## 2017-10-02 RX ADMIN — MINOXIDIL 10 MG: 10 TABLET ORAL at 08:19

## 2017-10-02 RX ADMIN — OXYCODONE HYDROCHLORIDE 5 MG: 5 TABLET ORAL at 09:26

## 2017-10-02 RX ADMIN — SENNOSIDES 8.6 MG: 8.6 TABLET, FILM COATED ORAL at 08:02

## 2017-10-02 RX ADMIN — ASPIRIN 81 MG 81 MG: 81 TABLET ORAL at 08:01

## 2017-10-02 RX ADMIN — SERTRALINE HYDROCHLORIDE 50 MG: 50 TABLET ORAL at 08:02

## 2017-10-02 RX ADMIN — LISINOPRIL 40 MG: 20 TABLET ORAL at 08:02

## 2017-10-02 RX ADMIN — Medication 1 TABLET: at 08:02

## 2017-10-02 RX ADMIN — CARVEDILOL 12.5 MG: 12.5 TABLET, FILM COATED ORAL at 08:01

## 2017-10-02 RX ADMIN — TRIAMCINOLONE ACETONIDE: 5 CREAM TOPICAL at 08:20

## 2017-10-02 RX ADMIN — POLYETHYLENE GLYCOL 3350 17 G: 17 POWDER, FOR SOLUTION ORAL at 08:02

## 2017-10-02 RX ADMIN — PANTOPRAZOLE SODIUM 40 MG: 40 TABLET, DELAYED RELEASE ORAL at 05:15

## 2017-10-02 NOTE — OCCUPATIONAL THERAPY NOTE
OccupationalTherapy Evaluation(time=0915-0930)     Patient Name: Hillary CAMPUZANO Date: 10/2/2017  Problem List  Patient Active Problem List   Diagnosis    Hypertension    CLL (chronic lymphocytic leukemia)    Primary osteoarthritis of right hip     Past Medical History  Past Medical History:   Diagnosis Date    Anxiety     BPH (benign prostatic hypertrophy)     CLL (chronic lymphocytic leukemia)     2009    Depression     GERD (gastroesophageal reflux disease)     Hearing loss of aging     Hiatal hernia     Hyperlipidemia     Hypertension     Iron deficiency anemia     OA (osteoarthritis)     right hip    Prostatitis     Pulmonary hypertension     Seasonal allergies     UTI (urinary tract infection)     in past    Wears glasses      Past Surgical History  Past Surgical History:   Procedure Laterality Date    COLONOSCOPY      FRACTURE SURGERY      left lower arm    FRACTURE SURGERY      left femur    [place] TOTAL HIP ARTHROPLASTY Right 9/29/2017    Procedure: ARTHROPLASTY HIP TOTAL ANTERIOR;  Surgeon: Shantal Uribe MD;  Location: [facility];  Service: Orthopedics    TONSILLECTOMY AND ADENOIDECTOMY      TRANSURETHRAL RESECTION OF PROSTATE      x 2      10/02/17 0930   Note Type   Note type Eval only   Restrictions/Precautions   Weight Bearing Precautions Per Order Yes   RLE Weight Bearing Per Order WBAT   Other Precautions Fall Risk;Pain   Pain Assessment   Pain Assessment 0-10   Pain Score 4   Pain Type Surgical pain   Pain Location Hip   Pain Orientation Right   Home Living   Type of Home Apartment  ([address])   Prior Function   Lives With Spouse   Lifestyle   Autonomy PTA pt states independence with all aspects of his ADLs, transfers, ambulation--w/o device; neg falls, +, neg home alone   Reciprocal Relationships 2 children, supportive wife   Service to Others works for the [employer]   Intrinsic Gratification watching TV   Psychosocial   Psychosocial (WDL) WDL   Subjective Subjective "My wife can help me at home "   ADL   Where Assessed Edge of bed   Eating Assistance 6  Modified independent   Grooming Assistance 6  Modified Independent   UB Bathing Assistance 5  Supervision/Setup   LB Bathing Assistance 4  Minimal Assistance   [address] 5  Supervision/Setup   LB Dressing Assistance 4  [address]  5  Supervision/Setup   Bed Mobility   Rolling R 5  Supervision   Rolling L 5  Supervision   Supine to Sit 5  Supervision   Sit to Supine 4  Minimal assistance   Transfers   Sit to Stand 6  Modified independent   Additional items Armrests   Stand to Sit 6  Modified independent   Additional items Armrests   Functional Mobility   Functional Mobility 5  Supervision   Additional items Rolling walker   Balance   Static Sitting Normal   Dynamic Sitting Normal   Static Standing Good   Dynamic Standing Fair +   Activity Tolerance   Activity Tolerance Patient limited by fatigue;Patient limited by pain   Medical Staff Made Aware [email or web address]   RUE Assessment   RUE Assessment WFL   RUE Strength   RUE Overall Strength Within Functional Limits - able to perform ADL tasks with strength   LUE Assessment   LUE Assessment WFL   LUE Strength   LUE Overall Strength Within Functional Limits - able to perform ADL tasks with strength   Hand Function   Gross Motor Coordination Functional   Fine Motor Coordination Functional   Sensation   Light Touch No apparent deficits   Proprioception   Proprioception No apparent deficits   Vision-Basic Assessment   Current Vision Wears glasses all the time   Vision - Complex Assessment   Acuity Able to read clock/calendar on wall without difficulty   Perception   Inattention/Neglect Appears intact   Cognition   Overall Cognitive Status WFL   Arousal/Participation Alert   Attention Within functional limits   Orientation Level Oriented X4   Memory Within functional limits   Following Commands Follows all commands and directions without difficulty   Assessment   Limitation Decreased ADL status; Decreased endurance   Prognosis Good   Assessment Pt is a 66y/o male admitted to the hospital for an elected R THR--anterior approach(9/29) 2* hx OA  PTA pt states independence with all aspects of his ADLs, transfers, ambulation--w/o device; neg falls, +, neg home alone  During initial eval, pt demonstrated slight deficits with his functional balance, functional mobility, and ADL status  Pt states good support in home environment and states no concerns about going directly home  Reviewed availability of LE adaptive equipment(i e "hip kit") but pt declining purchasing of equipment  Pt would benefit from continued P T  to improve his overall endurance, balance, and mobility  Acute OT tx not indicated at this time 2* limited ADL deficits      Goals   Patient Goals "to go home "   Plan   OT Frequency Eval only   Recommendation   Discharge Recommendation (continue P T  )   Barthel Index   Feeding 10   Bathing 0   Grooming Score 5   Dressing Score 5   Bladder Score 10   Bowels Score 10   Toilet Use Score 10   Transfers (Bed/Chair) Score 10   Mobility (Level Surface) Score 0   Stairs Score 0   Barthel Index Score 60   Zohra Briceño, OT

## 2017-10-02 NOTE — DISCHARGE SUMMARY
DISCHARGE SUMMARY      Patient Name: Sandy Miller  Patient MRN: [id number]  Admitting Provider: Gary Duarte MD  Discharging Provider: Gary Duarte MD  Primary Care Physician at Discharge: Arsenio Paul [phone]  Admission Date: 9/29/2017       Discharge Date: 10/2/17    Admission Diagnosis  Primary osteoarthritis of right hip [M16 11]    Discharge Diagnoses  [facility] Course  Sandy Miller was admitted to [address] [place] [address] on 9/29/2017, with diagnosis of osteoarthritis in his Right hip  On the day of admission, he was brought to surgery, successfully underwent a Right hip replacement  He tolerated the procedure well  Following surgery, he was taken to the recovery room, at which time, there was a consult placed for Dr Dontae Emanuel for the patient's medical management  After a short stay in the recovery room, he was transferred to the orthopedic floor at which time, he was resumed on his routine home medications per the hospitalist group  On postop day #1, he was able to start some physical therapy and was able to tolerate it well  At this time, he was in stable condition, showing no sign of deep vein thrombosis or pulmonary embolism  Incision was healing well at the time and showed no signs of infection  DISCHARGE DIAGNOSIS: Primary osteoarthritis of right hip [M16 11]  He is now status post Right total hip replacement, which he underwent during the hospital stay  Medications  See after visit summary for reconciled discharge medications provided to patient and family      Allergies  Allergies   Allergen Reactions    Codeine Other (See Comments)     agitated    Bactrim [Sulfamethoxazole-Trimethoprim] GI Intolerance       Outpatient Follow-Up  Future Appointments  Date Time Provider Dejon Escalante   10/13/2017 2:15 PM Arsenio Paul MD [facility] appts None   2/22/2018 9:20 AM Leticia Siddiqi MD [facility] appts None       Discharge Disposition  home    Operative Procedures Performed  Procedure(s):  ARTHROPLASTY HIP TOTAL ANTERIOR        Pallavi Hines PA-C    DISCHARGE SUMMARY

## 2017-10-02 NOTE — SOCIAL WORK
IMM Notice presented to the patient, patient signed the notice, & I faxed it to [facility]/ [facility]

## 2017-10-02 NOTE — PHYSICAL THERAPY NOTE
Physical Therapy Progress Note     10/02/17 0858   Pain Assessment   Pain Assessment 0-10   Pain Score 4   Pain Type Surgical pain   Pain Location Hip   Pain Orientation Right   Hospital Pain Intervention(s) Cold applied; Ambulation/increased activity   Response to Interventions Tolerated  Restrictions/Precautions   Weight Bearing Precautions Per Order Yes   RLE Weight Bearing Per Order WBAT   Other Precautions Fall Risk;Pain   General   Chart Reviewed Yes   Response to Previous Treatment Patient with no complaints from previous session  Family/Caregiver Present No   Subjective   Subjective Willing to participte in therapy this AM    Bed Mobility   Sit to Supine 5  Supervision   Additional items Bedrails;Leg ;LE management   Transfers   Sit to Stand 6  Modified independent   Additional items Armrests   Stand to Sit 6  Modified independent   Additional items Bedrails   Ambulation/Elevation   Gait pattern Excessively slow; Antalgic;Decreased R stance; Forward Flexion   Gait Assistance 5  Supervision   Additional items Assist x 1;Verbal cues; Tactile cues   Assistive Device Rolling walker   Distance 200'   Balance   Static Sitting Normal   Dynamic Sitting Normal   Static Standing Good   Dynamic Standing Fair   Ambulatory Fair   Endurance Deficit   Endurance Deficit No   Activity Tolerance   Activity Tolerance Patient tolerated treatment well   Nurse Made Aware Yes   Exercises   THR Sitting;10 reps;AROM; Bilateral   Assessment   Prognosis Good   Problem List Decreased strength;Decreased range of motion;Decreased endurance; Impaired balance;Decreased mobility;Orthopedic restrictions;Pain   Assessment Pt  seated in bedside chair upon my arrival  Performance of HEP seated in bedside chair  Able to complete all transfers practicing proper technique with no noted LOB  Progressed with amb  trial with noted improvement in LE sequencing  After amb  trial pt  requested to return to bed due to fatigue   Repositioned supine in bed where stair training was discussed in detail  Pt  reports he is understanding and has no questions  Remained supine in bed with ice applied at end of treatment session  Pt  has completed all his PT goals and is safe for d/c home with Home PT and family support as needed when medically stable  Barriers to Discharge None   Goals   Patient Goals To go home this AM    [facility] Expiration Date 10/03/17   Treatment Day 5   Plan   Treatment/Interventions Functional transfer training;LE strengthening/ROM; Therapeutic exercise; Endurance training;Elevations; Bed mobility;Gait training;Spoke to nursing;Spoke to case management   Progress Improving as expected   PT Frequency Twice a day   Recommendation   Recommendation Home PT; Home with family support   PT - OK to Discharge Yes  (if d/c when medically stable )     Maylin Lobo, PTA

## 2017-10-02 NOTE — PROGRESS NOTES
Orthopedic Total Hip Progress Note      Subjective     Status post-Right total hip arthroplasty  Systemic or Specific Complaints: No Complaints    Objective     Vital signs in last 24 hours:  Temp:  [98 7 °F (37 1 °C)-99 1 °F (37 3 °C)] 98 7 °F (37 1 °C)  HR:  [60-66] 66  Resp:  [18] 18  BP: (144-177)/(61-82) 144/61    Neurovascular: Capillary refill: Normal  Dorsiflexion: 5/5  Plantarflexion:  5/5   Wound: Dressing:  clean/dry/intact   DVT Exam: No evidence of DVT seen on physical exam      Data Review:  CBC:   Lab Results   Component Value Date    WBC 15 64 (H) 10/01/2017    WBC 22 68 (H) 07/20/2015    RBC 3 17 (L) 10/01/2017    RBC 4 14 07/20/2015    HGB 9 8 (L) 10/01/2017    HGB 10 7 (L) 07/20/2015    HCT 30 1 (L) 10/01/2017    HCT 33 0 (L) 07/20/2015     (L) 10/01/2017     07/20/2015       Assessment/Plan     Status post-Right total hip arthroplasty: Doing well postoperatively      Discharge today, Return to Clinic: as scheduled      Activity: ad bernardo      Shasha Oneal    Date: 10/2/2017  Time: 6:47 AM

## 2017-10-02 NOTE — PLAN OF CARE
Problem: PHYSICAL THERAPY ADULT  Goal: Performs mobility at highest level of function for planned discharge setting  See evaluation for individualized goals  Treatment/Interventions: Functional transfer training, LE strengthening/ROM, Elevations, Therapeutic exercise, Endurance training, Patient/family training, Equipment eval/education, Bed mobility, Gait training, Spoke to nursing          See flowsheet documentation for full assessment, interventions and recommendations  Outcome: Progressing  Prognosis: Good  Problem List: Decreased strength, Decreased range of motion, Decreased endurance, Impaired balance, Decreased mobility, Orthopedic restrictions, Pain  Assessment: Pt  seated in bedside chair upon my arrival  Performance of HEP seated in bedside chair  Able to complete all transfers practicing proper technique with no noted LOB  Progressed with amb  trial with noted improvement in LE sequencing  After amb  trial pt  requested to return to bed due to fatigue  Repositioned supine in bed where stair training was discussed in detail  Pt  reports he is understanding and has no questions  Remained supine in bed with ice applied at end of treatment session  Pt  has completed all his PT goals and is safe for d/c home with Home PT and family support as needed when medically stable  Barriers to Discharge: None  Barriers to Discharge Comments: CHRISTOPHER  Recommendation: Home PT, Home with family support     PT - OK to Discharge: Yes (if d/c when medically stable )    See flowsheet documentation for full assessment

## 2017-10-04 NOTE — CASE MANAGEMENT
Notification of Discharge  This is a Notification of Discharge from our facility [address]  Please be advised that this patient has been discharge from our facility  Below you will find the admission and discharge date and time including the patients disposition  PRESENTATION DATE: 9/29/2017  5:48 AM  IP ADMISSION DATE: 9/29/17 0934  DISCHARGE DATE: 10/2/2017 11:18 AM  DISPOSITION: Home with [address] [place] [address] in the [facility] by Maycol Vaughn for 2017  Network Utilization Review Department  Phone: [phone]; Fax [phone]  ATTENTION: The Network Utilization Review Department is now centralized for our 7 Facilities  Make a note that we have a new phone and fax numbers for our Department  Please call with any questions or concerns to [phone] and carefully follow the prompts so that you are directed to the right person  All voicemails are confidential  Fax any determinations, approvals, denials, and requests for initial or continue stay review clinical to [phone]  Due to HIGH CALL volume, it would be easier if you could please send faxed requests to expedite your requests and in part, help us provide discharge notifications faster

## 2017-10-13 ENCOUNTER — ALLSCRIPTS OFFICE VISIT (OUTPATIENT)
Dept: OTHER | Facility: OTHER | Age: 67
End: 2017-10-13

## 2017-12-06 ENCOUNTER — ALLSCRIPTS OFFICE VISIT (OUTPATIENT)
Dept: OTHER | Facility: OTHER | Age: 67
End: 2017-12-06

## 2017-12-06 DIAGNOSIS — I10 ESSENTIAL (PRIMARY) HYPERTENSION: ICD-10-CM

## 2017-12-06 DIAGNOSIS — Z12.5 ENCOUNTER FOR SCREENING FOR MALIGNANT NEOPLASM OF PROSTATE: ICD-10-CM

## 2017-12-07 NOTE — PROGRESS NOTES
Assessment    1  Hypertension (401 9) (I10)   2  Hyponatremia (276 1) (E87 1)   3  Anemia (285 9) (D64 9)   4  Chronic lymphocytic leukemia (204 10) (C91 10)   5  Esophageal reflux (530 81) (K21 9)   6  Depression (311) (F32 9)   7  Special screening, prostate cancer (V76 44) (Z12 5)    Plan  Hypertension    · (1) LIPID PANEL, FASTING; Status:Active; Requested for:[id number];   Special screening, prostate cancer    · (1) PSA (SCREEN) (Dx V76 44 Screen for Prostate Cancer); Status:Active; Requestedfor:90Unw3948;     Discussion/Summary    Patient presents today for follow-up for his chronic health issues  He continues to do well status post right hip replacement  Blood pressure remains very well controlled  He has actually had a few low blood pressure readings at home, so we will need to watch out for any lightheadedness as he does remain on multiple blood pressure medications  I have given him a slip to have a lipid panel done  He will also be having a CMP CBC and ferritin done by Hematology for his history of CLL  He remains on omeprazole for his history of reflux and this is well controlled  Depression is well controlled with sertraline  He is free to start taking the omeprazole every other day since he has had no recurrent reflux  He notes he would like to take a daily  He is having some mild urinary hesitancy at nighttime  PSA will be ordered and prostate exam will be done next time  He may try to sit down to avoid which should help this  Chief Complaint  2M Follow up - HTNwith Flu shot      Advance Directives  Advance Directive  Luke:  YES - Patient has an advance health care directive  The patient has a living will located  a scanned copy is in the patient's chart  History of Present Illness  The patient presents today for a hypertension follow-up  Patient remains compliant with medications and denies any chest pain, shortness of breath, palpitations, lightheadedness or diaphoresis   He did have an abnormal EKG on a preop study and had a stress echo done which revealed some questionable pulmonary hypertension  He saw Cardiology who felt he did not have any significant cardiovascular disease  He is monitoring blood pressures at home and they have been excellent  He actually had a few low blood pressures, but has had no lightheadedness  has a history of reflux but denies dysphagia or heartburn has a history of depression which remained stable on sertraline  He takes a rare zolpidem at nighttime for insomnia  has a history of BPH and is status post TURP x2  He is having some urinary hesitancy intermittently  He denies dysuria or hematuria  This typically occurs at nighttime and does not bother him during the day  Review of Systems   Constitutional: recent weight loss, but-- no fever,-- not feeling poorly,-- no recent weight gain,-- no chills-- and-- not feeling tired  Eyes: no eye pain-- and-- no eyesight problems  ENT: no sore throat-- and-- no hoarseness  Cardiovascular: the heart rate was not slow,-- no chest pain,-- no intermittent leg claudication,-- the heart rate was not fast,-- no palpitations-- and-- no extremity edema  Respiratory: no shortness of breath,-- no cough,-- no orthopnea,-- no wheezing-- and-- no shortness of breath during exertion  Gastrointestinal: no abdominal pain,-- no nausea,-- no vomiting,-- no constipation-- and-- no diarrhea  Genitourinary: no dysuria,-- no urinary hesitancy-- and-- no incontinence  Musculoskeletal: no limb pain,-- no myalgias-- and-- no joint stiffness  Integumentary: no rashes,-- no itching,-- no dry skin-- and-- no skin lesions  Neurological: no headache-- and-- no numbness  Psychiatric: no anxiety-- and-- no depression  Hematologic/Lymphatic: no tendency for easy bleeding-- and-- no tendency for easy bruising  Active Problems  1  Abnormal EKG (794 31) (R94 31)   2  Abnormal stress echo (794 39) (R94 39)   3   Allergic rhinitis due to pollen (477 0) (J30 1)   4  Anemia (285 9) (D64 9)   5  Anxiety (300 00) (F41 9)   6  Arthritis of right hip (716 95) (M16 11)   7  Chronic lymphocytic leukemia (204 10) (C91 10)   8  Depression (311) (F32 9)   9  Diverticulitis of colon (562 11) (K57 32)   10  DJD (degenerative joint disease), lumbar (721 3) (M47 816)   11  Encounter for pre-operative cardiovascular clearance (V72 81) (Z01 810)   12  Enlarged prostate without lower urinary tract symptoms (luts) (600 00) (N40 0)   13  Erectile dysfunction of non-organic origin (302 72) (F52 21)   14  Esophageal reflux (530 81) (K21 9)   15  Exercise counseling (V65 41) (Z71 82)   16  Flu vaccine need (V04 81) (Z23)   17  Hematuria (599 70) (R31 9)   18  Hypertension (401 9) (I10)   19  Hyponatremia (276 1) (E87 1)   20  Inguinal hernia (550 90) (K40 90)   21  Insomnia (780 52) (G47 00)   22  Iron deficiency anemia (280 9) (D50 9)   23  Prolonged QT interval (794 31) (R94 31)   24  Pulmonary hypertension (416 8) (I27 20)   25  Special screening, prostate cancer (V76 44) (Z12 5)   26  Spinal stenosis of lumbar region (724 02) (M48 061)   27  Well adult on routine health check (V70 0) (Z00 00)    Past Medical History  1  History of Cough (786 2) (R05)   2  History of Depression (311) (F32 9)   3  History of acute bronchitis (V12 69) (Z87 09)   4  History of chest pain (V13 89) (Z87 898)   5  History of concussion (V15 52) (Z87 820)   6  History of falling (V15 88) (Z91 81)   7  History of gastrointestinal hemorrhage (V12 79) (Z87 19)   8  History of hiatal hernia (V12 79) (Z87 19)   9  History of hyperlipidemia (V12 29) (Z86 39)   10  History of pneumothorax (V12 69) (Z87 09)   11  History of prostatitis (V13 89) (Z87 438)   12  History of upper respiratory infection (V12 09) (Z87 09)   13  History of upper respiratory infection (V12 09) (Z87 09)   14  History of upper respiratory infection (V12 09) (Z87 09)   15  History of Microscopic hematuria (599 72) (R31 29)   16  History of Pain, upper back (724 5) (M54 9)   17  Personal history of urinary tract infection (V13 02) (Z87 440)   18  History of Shoulder joint pain, unspecified laterality   19  History of Urinary retention (788 20) (R33 9)   20  History of Urinary Tract Infection (V13 02)   21  History of UTI (lower urinary tract infection) (599 0) (N39 0)    The active problems and past medical history were reviewed and updated today  Surgical History  1  History of Diagnostic Cystoscopy   2  History of Hernia Repair   3  History of Spinal Anesthesia Epidural   4  History of Total Hip Replacement   5  History of Transurethral Resection Of Prostate (TURP)   6  History of Wrist Surgery    The surgical history was reviewed and updated today  Family History  Father    1  Family history of Acute Myocardial Infarction (V17 3)   2  Family history of Father  At Age 61  Brother    1  Family history of Diabetes Mellitus (V18 0)   4  Family history of Prostate Cancer (V16 42)    The family history was reviewed and updated today  Social History     · Alcohol Use (History)   · Former cigar smoker ( 84) (V21 851)   · Former smoker ( 84) (V05 062)   · Marital History - Currently   The social history was reviewed and updated today  Current Meds   1  Adult Low Dose Aspirin 81 MG TABS; TAKE 1 TABLET DAILY; Therapy: (Recorded:2017) to Recorded   2  AmLODIPine Besylate 5 MG Oral Tablet; TAKE 1 TABLET BY MOUTH DAILY; Therapy: 67Sdc8217 to (Evaluate:74Nph2848)  Requested for: 20Pll9194; Last Rx:07Apm3850 Ordered   3  Carvedilol 12 5 MG Oral Tablet; TAKE 1 TABLET TWICE DAILY; Therapy: 06EEJ5629 to (798 660 361)  Requested for: 75IVX2256; Last Rx:90Rjg4445 Ordered   4  Ecotrin Low Strength 81 MG Oral Tablet Delayed Release; TAKE 1 TABLET DAILY; Therapy: (Recorded:2017) to Recorded   5  Fish Oil 1000 MG Oral Capsule; One daily; Therapy: (Recorded:2017) to Recorded   6   Lisinopril 40 MG Oral Tablet; TAKE 1 TABLET DAILY; Therapy: 23IIB6369 to (Evaluate:04Qqg5525)  Requested for: 16Nvy8603; Last Rx:77Vef4826 Ordered   7  Milk Thistle CAPS Recorded   8  Minoxidil 10 MG Oral Tablet; TAKE ONE TABLET BY MOUTH DAY; Therapy: 14WET8137 to (Evaluate:32Seq5745)  Requested for: 67Ecv7121; Last Rx:17Zvs8799 Ordered   9  Montelukast Sodium 10 MG Oral Tablet; TAKE 1 TABLET DAILY  Requested for: 02CFY6028; Last VC:39JIW9940 Ordered   10  Omeprazole 40 MG Oral Capsule Delayed Release; 1 PO QD; Therapy: 08VHJ7983 to (Evaluate:73Kol2237)  Requested for: 67FKZ4991; Last  Rx:30Eep7108 Ordered   11  Sertraline HCl - 100 MG Oral Tablet; TAKE 1/2 TABLET DAILY; Therapy: 40AKF6502 to (Evaluate:67Mnd9780)  Requested for: 00Cqf0056 Recorded   12  Simvastatin 20 MG Oral Tablet; TAKE 1 TABLET AT BEDTIME; Therapy: 28Ijf2519 to (Last Rx:28Bru4775)  Requested for: 10Ogz7905 Ordered   13  Tamsulosin HCl - 0 4 MG Oral Capsule; TAKE 1 CAPSULE AT BEDTIME; Therapy: 77AZV8265 to (Evaluate:47Osy6936)  Requested for: 96WAP6832; Last  Rx:27Oam6226 Ordered   14  Zolpidem Tartrate 10 MG Oral Tablet; TAKE 1 TABLET AT BEDTIME AS NEEDED; Therapy: 71GBL1390 to (Evaluate:51Qzc3964)  Requested for: 37LUI8833; Last  Rx:85Gmo0256 Ordered    The medication list was reviewed and updated today  Allergies  1  Bactrim DS TABS   2  Codeine Derivatives    Vitals  Vital Signs    Recorded: 88BCC1871 11:20AM   Heart Rate 60   Respiration 18   Systolic 634   Diastolic 80   Height 5 ft 7 in   Weight 186 lb    BMI Calculated 29 13   BSA Calculated 1 96   O2 Saturation 98, Non-Rebreather       Physical Exam   Constitutional  General appearance: No acute distress, well appearing and well nourished  Eyes  Conjunctiva and lids: No swelling, erythema, or discharge  Pupils and irises: Equal, round and reactive to light  Pulmonary  Respiratory effort: No increased work of breathing or signs of respiratory distress     Auscultation of lungs: Clear to auscultation, equal breath sounds bilaterally, no wheezes, no rales, no rhonci  Cardiovascular  Auscultation of heart: Normal rate and rhythm, normal S1 and S2, without murmurs  Examination of extremities for edema and/or varicosities: Normal    Carotid pulses: Normal    Abdomen  Abdomen: Non-tender, no masses  Liver and spleen: No hepatomegaly or splenomegaly  Lymphatic  Palpation of lymph nodes in neck: No lymphadenopathy  Musculoskeletal  Gait and station: Normal    Skin  Skin and subcutaneous tissue: Normal without rashes or lesions  Psychiatric  Orientation to person, place and time: Normal    Mood and affect: Normal          Future Appointments    Date/Time Provider Specialty Site   02/22/2018 09:20 AM MONICA Hargrove   Hematology [facility]cology [facility]       Signatures   Electronically signed by : MONICA Marrero ; Dec  6 2017 11:58AM EST                       (Author)

## 2017-12-19 ENCOUNTER — GENERIC CONVERSION - ENCOUNTER (OUTPATIENT)
Dept: OTHER | Facility: OTHER | Age: 67
End: 2017-12-19

## 2017-12-19 ENCOUNTER — ALLSCRIPTS OFFICE VISIT (OUTPATIENT)
Dept: OTHER | Facility: OTHER | Age: 67
End: 2017-12-19

## 2017-12-20 NOTE — PROGRESS NOTES
Assessment  1  Hypertension (401 9) (I10)    Discussion/Summary    1  Hypertension -we reviewed that while his blood pressure is mildly elevated today, it appears that he usually has good control of his blood pressure  He was encouraged to continue with same medication and to continue monitoring his blood pressure at home  His machine has been verified so we know that is giving accurate readings  He was encouraged to call if he finds at any point prior to his next visit that he is getting persistently elevated readings  The patient was in agreement with this plan  The treatment plan was reviewed with the patient/guardian  The patient/guardian understands and agrees with the treatment plan      Chief Complaint  c/o high blood pressure  History of Present Illness  HPI: The patient presented today to the office for a blood pressure check with the nurse  He was converted to an appointment due to elevation in his blood pressure reading  Upon further discussion with the patient today, he reveals that he has been checking his blood pressures at home with this machine  He has been getting reading the usually well controlled in the 100-140s/50-80s  He did have a BP yesterday of 159/86 as well as this morning of 151/83  He relates that he has been running around a lot though lately and likely contributed to the elevations  He denies any symptoms of poor control such as chest pain, HA, SOB  He had normal BP at his recent with Dr Moreno Sessions 2 weeks ago  Review of Systems   Constitutional: not feeling poorly  Cardiovascular: no chest pain-- and-- no palpitations  Respiratory: no shortness of breath  Neurological: no headache-- and-- no dizziness  Active Problems  1  Abnormal EKG (794 31) (R94 31)   2  Abnormal stress echo (794 39) (R94 39)   3  Allergic rhinitis due to pollen (477 0) (J30 1)   4  Anemia (285 9) (D64 9)   5  Anxiety (300 00) (F41 9)   6  Arthritis of right hip (716 95) (M16 11)   7   Chronic lymphocytic leukemia (204 10) (C91 10)   8  Depression (311) (F32 9)   9  Diverticulitis of colon (562 11) (K57 32)   10  DJD (degenerative joint disease), lumbar (721 3) (M47 816)   11  Encounter for pre-operative cardiovascular clearance (V72 81) (Z01 810)   12  Enlarged prostate without lower urinary tract symptoms (luts) (600 00) (N40 0)   13  Erectile dysfunction of non-organic origin (302 72) (F52 21)   14  Esophageal reflux (530 81) (K21 9)   15  Exercise counseling (V65 41) (Z71 82)   16  Flu vaccine need (V04 81) (Z23)   17  Hematuria (599 70) (R31 9)   18  Hypertension (401 9) (I10)   19  Hyponatremia (276 1) (E87 1)   20  Inguinal hernia (550 90) (K40 90)   21  Insomnia (780 52) (G47 00)   22  Iron deficiency anemia (280 9) (D50 9)   23  Prolonged QT interval (794 31) (R94 31)   24  Pulmonary hypertension (416 8) (I27 20)   25  Special screening, prostate cancer (V76 44) (Z12 5)   26  Spinal stenosis of lumbar region (724 02) (M48 061)   27  Well adult on routine health check (V70 0) (Z00 00)    Past Medical History  1  History of Cough (786 2) (R05)   2  History of Depression (311) (F32 9)   3  History of acute bronchitis (V12 69) (Z87 09)   4  History of chest pain (V13 89) (Z87 898)   5  History of concussion (V15 52) (Z87 820)   6  History of falling (V15 88) (Z91 81)   7  History of gastrointestinal hemorrhage (V12 79) (Z87 19)   8  History of hiatal hernia (V12 79) (Z87 19)   9  History of hyperlipidemia (V12 29) (Z86 39)   10  History of pneumothorax (V12 69) (Z87 09)   11  History of prostatitis (V13 89) (Z87 438)   12  History of upper respiratory infection (V12 09) (Z87 09)   13  History of upper respiratory infection (V12 09) (Z87 09)   14  History of upper respiratory infection (V12 09) (Z87 09)   15  History of Microscopic hematuria (599 72) (R31 29)   16  History of Pain, upper back (724 5) (M54 9)   17  Personal history of urinary tract infection (V13 02) (Z87 440)   18   History of Shoulder joint pain, unspecified laterality   19  History of Urinary retention (788 20) (R33 9)   20  History of Urinary Tract Infection (V13 02)   21  History of UTI (lower urinary tract infection) (599 0) (N39 0)    Family History  Father    1  Family history of Acute Myocardial Infarction (V17 3)   2  Family history of Father  At Age 61  Brother    1  Family history of Diabetes Mellitus (V18 0)   4  Family history of Prostate Cancer (V16 42)    Social History   · Alcohol Use (History)   · Former cigar smoker ( 82) (Z87 891)   · rare cigar   · Former smoker ( 82) (K27 021)   · started age 25, smoked 1ppd, quit in early 46s   · Marital History - Currently     Surgical History  1  History of Diagnostic Cystoscopy   2  History of Hernia Repair   3  History of Spinal Anesthesia Epidural   4  History of Total Hip Replacement   5  History of Transurethral Resection Of Prostate (TURP)   6  History of Wrist Surgery    Current Meds   1  Adult Low Dose Aspirin 81 MG TABS; TAKE 1 TABLET DAILY; Therapy: (Recorded:58Afm4393) to Recorded   2  AmLODIPine Besylate 5 MG Oral Tablet; TAKE 1 TABLET BY MOUTH DAILY; Therapy: 94Ouc4054 to (Evaluate:2018)  Requested for: 08Oje3202; Last Rx:44Ydm3657 Ordered   3  Carvedilol 12 5 MG Oral Tablet; TAKE 1 TABLET TWICE DAILY; Therapy: 53PXM8970 to (21 )  Requested for: 95DDN3571; Last Rx:77Qfm9801 Ordered   4  Ecotrin Low Strength 81 MG Oral Tablet Delayed Release; TAKE 1 TABLET DAILY; Therapy: (Recorded:81Ubi8290) to Recorded   5  Fish Oil 1000 MG Oral Capsule; One daily; Therapy: (Recorded:59Mim5948) to Recorded   6  Lisinopril 40 MG Oral Tablet; TAKE 1 TABLET DAILY; Therapy: 87HLP1991 to (Evaluate:29Aik3910)  Requested for: 02Dgm5720; Last Rx:30Ywq6519 Ordered   7  Milk Thistle CAPS Recorded   8  Minoxidil 10 MG Oral Tablet; TAKE ONE TABLET BY MOUTH DAY; Therapy: 92KIQ5643 to (Evaluate:57Btk3918)  Requested for: 86Vzh7263; Last Rx:41Kvx6810 Ordered   9  Montelukast Sodium 10 MG Oral Tablet; TAKE 1 TABLET DAILY  Requested for: 24XRQ0062; Last F81OSF3887 Ordered   10  Omeprazole 40 MG Oral Capsule Delayed Release; 1 PO QD; Therapy: 02DGI6970 to (Evaluate:98Spa4266)  Requested for: 76WGT7726; Last  Rx:37Cdq9955 Ordered   11  Sertraline HCl - 100 MG Oral Tablet; TAKE 1/2 TABLET DAILY; Therapy: 29ENK1491 to (Evaluate:92Bst9070)  Requested for: 65Jme1352 Recorded   12  Simvastatin 20 MG Oral Tablet; TAKE 1 TABLET AT BEDTIME; Therapy: 85Jce5546 to (Last Rx:57Eom2812)  Requested for: 61Wxm5080 Ordered   13  Tamsulosin HCl - 0 4 MG Oral Capsule; TAKE 1 CAPSULE AT BEDTIME; Therapy: 58KCJ8387 to (Evaluate:97Wpi3942)  Requested for: 53JJF4619; Last  Rx:56Rma9323 Ordered   14  Zolpidem Tartrate 10 MG Oral Tablet; TAKE 1 TABLET AT BEDTIME AS NEEDED; Therapy: 51QRK4221 to (Evaluate:96Ssi6535)  Requested for: 78LJN5962; Last  Rx:21Xog5015 Ordered    Allergies  1  Bactrim DS TABS   2  Codeine Derivatives    Vitals   Recorded: 50MAP1649 12:18PM Recorded: 98KAZ2495 11:05AM   Heart Rate  72   Systolic 034    Diastolic 78    Weight  796 lb    BMI Calculated  29 29   BSA Calculated  1 97    ** Printed in Appendix #1 below  Physical Exam   Constitutional  General appearance: No acute distress, well appearing and well nourished  Neck  Neck: Supple, symmetric, trachea midline, no masses  Pulmonary  Respiratory effort: No increased work of breathing or signs of respiratory distress  Future Appointments    Date/Time Provider Specialty Site   2018 09:00 AM MONICA Huntley  [facility]   2018 08:00 AM MONICA Encarnacion  [address] [place] [address]   2018 09:20 AM OMNICA Hercules   Hematology Oncology [facility]     Signatures   Electronically signed by : Marcela Rivas, [facility]; Dec 19 2017 12:30PM EST                       (Author)    Electronically signed by : MONICA Villa ; Dec 19 2017  9:47PM EST                          Appendix #1  Patient: Terry Malin ; : 1950; MRN: [id number]   Recorded: 63DGH0841 12:18PM Recorded: 62OPC1142 11:05AM Recorded: 71WRQ8824 11:01AM Recorded: 08YPF0267 50:26CR   Systolic 190  178 877   Diastolic 78  84 80   Temperature       Heart Rate  72  60   Pulse Quality       Respiration    18   Respiration Quality       Height    5 ft 7 in   Weight  187 lb   186 lb    BMI Calculated  29 29  29 13   BSA Calculated  1 97  1 96   Pain Scale       O2 Saturation    98, Non-Rebreather

## 2017-12-28 ENCOUNTER — ALLSCRIPTS OFFICE VISIT (OUTPATIENT)
Dept: OTHER | Facility: OTHER | Age: 67
End: 2017-12-28

## 2017-12-29 NOTE — PROGRESS NOTES
Assessment   1  Upper respiratory infection (465 9) (J06 9)    Plan   Upper respiratory infection    · Fluticasone Propionate 50 MCG/ACT Nasal Suspension; USE 2 SPRAYS IN EACH    NOSTRIL ONCE DAILY    Discussion/Summary      Salima Pillai is here with viral URI  Will treat symptomatically with increased fluids and Mucinex during the day may also continue Delsym  prn body aches  new rx Flonase 2 sprays each nostril  or recheck if not improving  Chief Complaint   c/o URI      History of Present Illness   HPI: cold sx began 2 days ago  congestion and pnd used Delsym which helped fever or chills great after hip replacement in September has been well controlled at home          Review of Systems        Constitutional: feeling poorly-- and-- feeling tired, but-- no fever-- and-- no chills  ENT: nasal discharge, but-- no earache-- and-- no sore throat  Cardiovascular: no complaints of slow or fast heart rate, no chest pain, no palpitations, no leg claudication or lower extremity edema  Respiratory: as noted in HPI  Gastrointestinal: no complaints of abdominal pain, no constipation, no nausea or vomiting, no diarrhea or bloody stools  Active Problems   1  Abnormal EKG (794 31) (R94 31)   2  Abnormal stress echo (794 39) (R94 39)   3  Allergic rhinitis due to pollen (477 0) (J30 1)   4  Anemia (285 9) (D64 9)   5  Anxiety (300 00) (F41 9)   6  Arthritis of right hip (716 95) (M16 11)   7  Chronic lymphocytic leukemia (204 10) (C91 10)   8  Depression (311) (F32 9)   9  Diverticulitis of colon (562 11) (K57 32)   10  DJD (degenerative joint disease), lumbar (721 3) (M47 816)   11  Encounter for pre-operative cardiovascular clearance (V72 81) (Z01 810)   12  Enlarged prostate without lower urinary tract symptoms (luts) (600 00) (N40 0)   13  Erectile dysfunction of non-organic origin (302 72) (F52 21)   14  Esophageal reflux (530 81) (K21 9)   15  Exercise counseling (V65 41) (Z71 82)   16   Flu vaccine need (V04 81) (Z23)   17  Hematuria (599 70) (R31 9)   18  Hypertension (401 9) (I10)   19  Hyponatremia (276 1) (E87 1)   20  Inguinal hernia (550 90) (K40 90)   21  Insomnia (780 52) (G47 00)   22  Iron deficiency anemia (280 9) (D50 9)   23  Prolonged QT interval (794 31) (R94 31)   24  Pulmonary hypertension (416 8) (I27 20)   25  Special screening, prostate cancer (V76 44) (Z12 5)   26  Spinal stenosis of lumbar region (724 02) (M48 061)   27  Well adult on routine health check (V70 0) (Z00 00)    Past Medical History   1  History of Cough (786 2) (R05)   2  History of Depression (311) (F32 9)   3  History of acute bronchitis (V12 69) (Z87 09)   4  History of chest pain (V13 89) (Z87 898)   5  History of concussion (V15 52) (Z87 820)   6  History of falling (V15 88) (Z91 81)   7  History of gastrointestinal hemorrhage (V12 79) (Z87 19)   8  History of hiatal hernia (V12 79) (Z87 19)   9  History of hyperlipidemia (V12 29) (Z86 39)   10  History of pneumothorax (V12 69) (Z87 09)   11  History of prostatitis (V13 89) (Z87 438)   12  History of upper respiratory infection (V12 09) (Z87 09)   13  History of upper respiratory infection (V12 09) (Z87 09)   14  History of upper respiratory infection (V12 09) (Z87 09)   15  History of Microscopic hematuria (599 72) (R31 29)   16  History of Pain, upper back (724 5) (M54 9)   17  Personal history of urinary tract infection (V13 02) (Z87 440)   18  History of Shoulder joint pain, unspecified laterality   19  History of Urinary retention (788 20) (R33 9)   20  History of Urinary Tract Infection (V13 02)   21  History of UTI (lower urinary tract infection) (599 0) (N39 0)  Active Problems And Past Medical History Reviewed: The active problems and past medical history were reviewed and updated today  Family History   Father    1  Family history of Acute Myocardial Infarction (V17 3)   2  Family history of Father  At Age 61  Brother    1   Family history of Diabetes Mellitus (V18 0)   4  Family history of Prostate Cancer (V16 42)    Social History    · Alcohol Use (History)   · Former cigar smoker (V15 82) (Z87 891)   · rare cigar   · Former smoker (V15 82) (B41 680)   · started age 25, smoked 1ppd, quit in early 46s   · Marital History - Currently     Surgical History   1  History of Diagnostic Cystoscopy   2  History of Hernia Repair   3  History of Spinal Anesthesia Epidural   4  History of Total Hip Replacement   5  History of Transurethral Resection Of Prostate (TURP)   6  History of Wrist Surgery    Current Meds    1  Adult Low Dose Aspirin 81 MG TABS; TAKE 1 TABLET DAILY; Therapy: (Recorded:17Bbs7237) to Recorded   2  AmLODIPine Besylate 5 MG Oral Tablet; TAKE 1 TABLET BY MOUTH DAILY; Therapy: 52Hut0299 to (Evaluate:17Aug2018)  Requested for: 50Rpn9616; Last     Rx:59Sbw0366 Ordered   3  Carvedilol 12 5 MG Oral Tablet; TAKE 1 TABLET TWICE DAILY; Therapy: 50VYK8774 to (Silvino Sampson)  Requested for: 10FGO7221; Last     Rx:46Yce9574 Ordered   4  Ecotrin Low Strength 81 MG Oral Tablet Delayed Release; TAKE 1 TABLET DAILY; Therapy: (Recorded:74Tdw7246) to Recorded   5  Fish Oil 1000 MG Oral Capsule; One daily; Therapy: (Recorded:75Jxa0155) to Recorded   6  Lisinopril 40 MG Oral Tablet; TAKE 1 TABLET DAILY; Therapy: 21TJA7234 to (Evaluate:15Dlv9070)  Requested for: 97Wqe0102; Last     Rx:77Fus5373 Ordered   7  Milk Thistle CAPS Recorded   8  Minoxidil 10 MG Oral Tablet; TAKE ONE TABLET BY MOUTH DAY; Therapy: 23PTG6225 to (Evaluate:59Bax9176)  Requested for: 88Reh1273; Last     Rx:26Jvj4368 Ordered   9  Montelukast Sodium 10 MG Oral Tablet; TAKE 1 TABLET DAILY  Requested for:     65WWK9158; Last HR:44QZJ5853 Ordered   10  Omeprazole 40 MG Oral Capsule Delayed Release; 1 PO QD; Therapy: 64QWM0673 to (Evaluate:07Nvb9397)  Requested for: 52KRR1264; Last      Rx:93Xsk9702 Ordered   11   Sertraline HCl - 100 MG Oral Tablet; TAKE 1/2 TABLET DAILY; Therapy: 50FRE3810 to (Evaluate:18Unc8615)  Requested for: 58Abj0559 Recorded   12  Simvastatin 20 MG Oral Tablet; TAKE 1 TABLET AT BEDTIME; Therapy: 16Yat7809 to (Last Rx:41Rud9438)  Requested for: 82Qyy6600 Ordered   13  Tamsulosin HCl - 0 4 MG Oral Capsule; TAKE 1 CAPSULE AT BEDTIME; Therapy: 87VOK6862 to (Evaluate:82Ibk0849)  Requested for: 19OYV8935; Last      Rx:08Ekj8396 Ordered   14  Zolpidem Tartrate 10 MG Oral Tablet; TAKE 1 TABLET AT BEDTIME AS NEEDED; Therapy: 15JTN1247 to (Evaluate:45Mgn5833)  Requested for: 24GLU0794; Last      Rx:84Ckb8082 Ordered     The medication list was reviewed and updated today  Allergies   1  Bactrim DS TABS   2  Codeine Derivatives    Vitals    Recorded: 28Dec2017 02:33PM   Temperature 99 F   Heart Rate 60   Respiration 18   Systolic 333   Diastolic 80   Height 5 ft 7 in   Weight 185 lb    BMI Calculated 28 98   BSA Calculated 1 96   O2 Saturation 97, RA     Physical Exam        Constitutional      General appearance: No acute distress, well appearing and well nourished  overweight  Ears, Nose, Mouth, and Throat      External inspection of ears and nose: Normal        Otoscopic examination: Tympanic membrance translucent with normal light reflex  Canals patent without erythema  Nasal mucosa, septum, and turbinates: Abnormal   There was clear rhinorrhea from both nares  The bilateral nasal mucosa was boggy  Oropharynx: Normal with no erythema, edema, exudate or lesions  Pulmonary      Respiratory effort: Abnormal   Respiratory Findings: paroxysmal cough  Auscultation of lungs: Clear to auscultation, equal breath sounds bilaterally, no wheezes, no rales, no rhonci  Cardiovascular      Auscultation of heart: Normal rate and rhythm, normal S1 and S2, without murmurs         Examination of extremities for edema and/or varicosities: Normal           Future Appointments      Date/Time Provider Specialty Site   02/13/2018 09:00 AM MONICA Jansen  Pulmonary Medicine [facility] [place]   04/06/2018 08:00 AM MONICA Joshi  [address] [place] [address]   02/22/2018 09:20 AM MONICA Bloom   Hematology Oncology [facility]     Signatures    Electronically signed by : Kj Michaels MD; Dec 28 2017  5:17PM EST                       (Author)

## 2018-01-10 NOTE — PROGRESS NOTES
Chief Complaint  pt here for nurse visit on bp check also has form for our office to fill out  bp at initial encounter is 154/80      Active Problems    1  Allergic rhinitis (477 9) (J30 9)   2  Allergic rhinitis due to pollen (477 0) (J30 1)   3  Anemia (285 9) (D64 9)   4  Anxiety (300 00) (F41 9)   5  Chronic lymphocytic leukemia (204 10) (C91 10)   6  Depression (311) (F32 9)   7  Diverticulitis of colon (562 11) (K57 32)   8  DJD (degenerative joint disease), lumbar (721 3) (M47 816)   9  Dysuria (788 1) (R30 0)   10  Encounter for removal of sutures (V58 32) (Z48 02)   11  Enlarged prostate without lower urinary tract symptoms (luts) (600 00) (N40 0)   12  Erectile dysfunction of non-organic origin (302 72) (F52 21)   13  Esophageal reflux (530 81) (K21 9)   14  Flu vaccine need (V04 81) (Z23)   15  Hematuria (599 70) (R31 9)   16  Hip pain, unspecified laterality   17  Hypercholesterolemia (272 0) (E78 00)   18  Hypertension (401 9) (I10)   19  Hyponatremia (276 1) (E87 1)   20  Incomplete emptying of bladder (788 21) (R33 9)   21  Influenza vaccination administered during current admission (V04 81) (Z23)   22  Influenza vaccination administered during current admission (V04 81) (Z23)   23  Inguinal hernia (550 90) (K40 90)   24  Insomnia (780 52) (G47 00)   25  Lower abdominal pain (789 09) (R10 30)   26  Lumbar radiculopathy (724 4) (M54 16)   27  Need for tetanus booster (V03 7) (Z23)   28  Other muscle spasm (728 85) (M62 838)   29  Pain, upper back (724 5) (M54 9)   30  Shoulder joint pain, unspecified laterality   31  Special screening, prostate cancer (V76 44) (Z12 5)   32  Vaccine for VZV (varicella-zoster virus) (V05 4) (Z23)   33  Well adult on routine health check (V70 0) (Z00 00)    Current Meds   1  AmLODIPine Besylate 5 MG Oral Tablet; TAKE 1 TABLET BY MOUTH DAILY; Therapy: 43Lut2116 to (Evaluate:09Sjq8140)  Requested for: 80Nsl1625; Last   Rx:43Kaj0546 Ordered   2   Carvedilol 12 5 MG Oral Tablet; TAKE 1 TABLET TWICE DAILY; Therapy: 58BNH9426 to (Evaluate:17Aug2017)  Requested for: 86Fst6950; Last   Rx:06Yoo6712 Ordered   3  Ecotrin Low Strength 81 MG Oral Tablet Delayed Release; Therapy: (Recorded:44Gtt6999) to Recorded   4  Fish Oil 1000 MG Oral Capsule Recorded   5  Fluticasone Propionate 50 MCG/ACT Nasal Suspension; INSTILL 2 SPRAY Daily; Therapy: 65EBV2061 to (Evaluate:08Jan2016)  Requested for: 36BWM8897; Last   Rx:13Jan2015 Ordered   6  Lisinopril 40 MG Oral Tablet; TAKE 1 TABLET DAILY; Therapy: 21SPC3548 to (Evaluate:17Aug2017)  Requested for: 93Rzr1642; Last   Rx:19Tnp8978 Ordered   7  Milk Thistle CAPS Recorded   8  Minoxidil 10 MG Oral Tablet; TAKE ONE TABLET BY MOUTH DAY; Therapy: 20WVN9046 to (Evaluate:17Aug2017)  Requested for: 99Zkp1128; Last   Rx:20Nna2088 Ordered   9  Montelukast Sodium 10 MG Oral Tablet (Singulair); TAKE 1 TABLET DAILY; Last   Rx:93Gwp3580 Ordered   10  Naproxen 500 MG Oral Tablet; TAKE 1 TABLET EVERY 12 HOURS WITH FOOD AS    NEEDED; Therapy: 94OLI1451 to (Salima Kerns)  Requested for: 60Ods8877; Last    Rx:43Qub4802 Ordered   11  Omeprazole 40 MG Oral Capsule Delayed Release; 1 PO QD; Therapy: 65GOG0592 to (Evaluate:17Aug2017)  Requested for: 09Knl2905; Last    Rx:16Ufn5948 Ordered   12  ProAir  (90 Base) MCG/ACT Inhalation Aerosol Solution; INHALE 2 PUFFS    EVERY 6 HOURS AS NEEDED; Therapy: 84Dhi9101 to (Evaluate:24Trg1884); Last Rx:66Sre4638 Ordered   13  Sertraline HCl - 100 MG Oral Tablet; TAKE 1 TABLET DAILY; Therapy: 85DOY5833 to (Evaluate:17Aug2017)  Requested for: 15Hqg7214; Last    Rx:44Mfl2452 Ordered   14  Simvastatin 20 MG Oral Tablet; TAKE 1 TABLET AT BEDTIME; Therapy: 20Kzc1998 to (Last Rx:22Aug2016)  Requested for: 64Zcw1190 Ordered   15  Tamsulosin HCl - 0 4 MG Oral Capsule; TAKE 1 CAPSULE AT BEDTIME; Therapy: 41MWV1028 to (Evaluate:17Aug2017)  Requested for: 95Hwc0049; Last    Rx:79Hfm0223 Ordered   16  Zolpidem Tartrate 10 MG Oral Tablet; TAKE 1 TABLET AT BEDTIME AS NEEDED; Therapy: 44QSZ2285 to (Avis Valente)  Requested for: 50Vyt0504; Last    Rx:98Dvg4089 Ordered    Allergies    1  Bactrim DS TABS   2  Codeine Derivatives    Future Appointments    Date/Time Provider Specialty Site   02/22/2017 09:45 MONICA Arreola  [address] [place] [address]   02/27/2017 10:00 AM MONICA Katz  [address] [place] [address]   02/09/2017 09:40 AM MONICA Ballard   Hematology Oncology [facility]     Signatures   Electronically signed by : MONICA Benavides ; Jan 4 2017 10:16PM EST

## 2018-01-11 NOTE — CONSULTS
Chief Complaint  Pre-op clearance - Right total hip replacement scheduled on 9/29/17 at [facility], performed by Dr Geronimo Hammond  Flu shot given today      History of Present Illness  Pre-Op Visit (Brief): The patient is being seen for a preoperative visit  Surgical Risk Assessment:   Prior Anesthesia: He had prior anesthesia, no prior adverse reaction to spinal anesthesia and no prior adverse reaction to general anesthesia  Exercise Capacity: able to walk four blocks without symptoms and able to walk two flights of stairs without symptoms  Lifestyle Factors: denies tobacco use and Daily alcohol 5-6 per day*******  Symptoms: no easy bleeding, no easy bruising, no chest pain, no dyspnea, no edema, no palpitations and no wheezing  HPI: Patient presents today for a preoperative clearance for a right total hip replacement  This will be done at [facility] on September 29 by Dr Geronimo Hammond  The patient has had his preoperative blood work done which revealed improvement in his hemoglobin and a stable white blood cell count  He does have known CLL and follows routinely with hematology  He just finished up 6 rounds of Venofer  Besides his pain, he is currently feeling quite well  He does imbibe in about 5-6 alcoholic drinks per day, but is going to scale back prior to the surgery  He has a history of severe hypertension on multiple agents  He has already been counseled by anesthesia to hold his minoxidil the day of the surgery  He is planning on holding his aspirin as well  Preoperative chest x-ray was normal but he did have QT prolongation on his EKG  He recently had a stress test in March which was normal from a cardiac perspective but did show significant pulmonary hypertension  I had him see Dr Debra Martinez of pulmonary who hasn't getting a normal echocardiogram to reassess this  He has a history of depression but feels this is stable  He remains on sertraline at 100 mg daily    He has history of hyponatremia which improved on recent blood work  BPH is stable with tamsulosin and is voiding well  He remains on omeprazole with good control of his reflux  He has a history of insomnia and takes Ambien on a rare basis  Review of Systems    Constitutional: no fever, not feeling poorly, no recent weight gain, no chills, not feeling tired and no recent weight loss  Eyes: no eye pain and no eyesight problems  ENT: no sore throat and no hoarseness  Cardiovascular: the heart rate was not slow, no chest pain, no intermittent leg claudication, the heart rate was not fast, no palpitations and no extremity edema  Respiratory: no shortness of breath, no cough, no orthopnea, no wheezing and no shortness of breath during exertion  Gastrointestinal: no abdominal pain, no nausea, no vomiting, no constipation and no diarrhea  Genitourinary: no dysuria, no urinary hesitancy and no incontinence  Musculoskeletal: arthralgias, limb pain and joint stiffness, but as noted in HPI and no myalgias  Integumentary: no rashes, no itching, no dry skin and no skin lesions  Neurological: no headache and no numbness  Psychiatric: no anxiety and no depression  Hematologic/Lymphatic: no tendency for easy bleeding and no tendency for easy bruising  Active Problems    1  Allergic rhinitis due to pollen (477 0) (J30 1)   2  Anemia (285 9) (D64 9)   3  Anxiety (300 00) (F41 9)   4  Chronic lymphocytic leukemia (204 10) (C91 10)   5  Depression (311) (F32 9)   6  Diverticulitis of colon (562 11) (K57 32)   7  DJD (degenerative joint disease), lumbar (721 3) (M47 816)   8  Enlarged prostate without lower urinary tract symptoms (luts) (600 00) (N40 0)   9  Erectile dysfunction of non-organic origin (302 72) (F52 21)   10  Esophageal reflux (530 81) (K21 9)   11  Flu vaccine need (V04 81) (Z23)   12  Hematuria (599 70) (R31 9)   13  Hip pain, right (719 45) (M25 551)   14  Hypertension (401 9) (I10)   15  Hyponatremia (276 1) (E87 1)   16  Inguinal hernia (550 90) (K40 90)   17  Insomnia (780 52) (G47 00)   18  Iron deficiency anemia (280 9) (D50 9)   19  Other muscle spasm (728 85) (M62 838)   20  Pulmonary hypertension (416 8) (I27 2)   21  Shortness of breath on exertion (786 05) (R06 02)   22  Special screening, prostate cancer (V76 44) (Z12 5)   23  Spinal stenosis of lumbar region (724 02) (M48 06)   24  Well adult on routine health check (V70 0) (Z00 00)    Past Medical History    · History of Cough (786 2) (R05)   · History of Depression (311) (F32 9)   · History of acute bronchitis (V12 69) (Z87 09)   · History of chest pain (V13 89) (H66 617)   · History of concussion (V15 52) (Z87 820)   · History of falling (V15 88) (Z91 81)   · History of gastrointestinal hemorrhage (V12 79) (Z87 19)   · History of hiatal hernia (V12 79) (Z87 19)   · History of hyperlipidemia (V12 29) (Z86 39)   · History of pneumothorax (V12 69) (Z87 09)   · History of prostatitis (V13 89) (D17 941)   · History of upper respiratory infection (V12 09) (Z87 09)   · History of upper respiratory infection (V12 09) (Z87 09)   · History of upper respiratory infection (V12 09) (Z87 09)   · History of Microscopic hematuria (599 72) (R31 29)   · History of Pain, upper back (724 5) (M54 9)   · Personal history of urinary tract infection (V13 02) (Z87 440)   · History of Shoulder joint pain, unspecified laterality   · History of Urinary retention (788 20) (R33 9)   · History of Urinary Tract Infection (V13 02)   · History of UTI (lower urinary tract infection) (599 0) (N39 0)    The active problems and past medical history were reviewed and updated today  Surgical History    · History of Diagnostic Cystoscopy   · History of Hernia Repair   · History of Spinal Anesthesia Epidural   · History of Transurethral Resection Of Prostate (TURP)   · History of Wrist Surgery    The surgical history was reviewed and updated today         Family History    · Family history of Acute Myocardial Infarction (V17 3)   · Family history of Father  At Age 61    · Family history of Diabetes Mellitus (V18 0)   · Family history of Prostate Cancer (V16 42)    The family history was reviewed and updated today  Social History    · Alcohol Use (History)   · Former cigar smoker () (G91 569)   · rare cigar   · Former smoker () (T86 282)   · started age 25, smoked 1ppd, quit in early 46s   · Marital History - Currently   The social history was reviewed and updated today  Current Meds   1  AmLODIPine Besylate 5 MG Oral Tablet; TAKE 1 TABLET BY MOUTH DAILY; Therapy: 35Zwz9910 to (Evaluate:2018)  Requested for: 31Lwd5428; Last   Rx:96Hba3719 Ordered   2  Carvedilol 12 5 MG Oral Tablet; TAKE 1 TABLET TWICE DAILY; Therapy: 96FGE0434 to (Evaluate:2017)  Requested for: 21Asq7481; Last   Rx:99Jol9491 Ordered   3  Ecotrin Low Strength 81 MG Oral Tablet Delayed Release; TAKE 1 TABLET DAILY; Therapy: (Recorded:2017) to Recorded   4  Fish Oil 1000 MG Oral Capsule; One daily; Therapy: (Recorded:2017) to Recorded   5  Fluticasone Propionate 50 MCG/ACT Nasal Suspension; INSTILL 2 SPRAY Daily; Therapy: 24JOT4116 to (Evaluate:2016)  Requested for: 14AEP1892; Last   Rx:2015 Ordered   6  Lisinopril 40 MG Oral Tablet; TAKE 1 TABLET DAILY; Therapy: 47XOM4702 to (Evaluate:2017)  Requested for: 55Wlu2785; Last   Rx:89Ttd9295 Ordered   7  Milk Thistle CAPS Recorded   8  Minoxidil 10 MG Oral Tablet; TAKE ONE TABLET BY MOUTH DAY; Therapy: 40BTF6606 to (Evaluate:2017)  Requested for: 72Slw4479; Last   Rx:09Pkd2662 Ordered   9  Montelukast Sodium 10 MG Oral Tablet; TAKE 1 TABLET DAILY  Requested for:   29VHA6633; Last SZ:59RJF8737 Ordered   10  Naproxen 500 MG Oral Tablet; TAKE 1 TABLET EVERY 12 HOURS WITH FOOD AS    NEEDED; Therapy: 64WNC4807 to (Evaluate:2017)  Requested for: 64PUY1676; Last    Rx:2017 Ordered   11   Omeprazole 40 MG Oral Capsule Delayed Release; 1 PO QD; Therapy: 36JRD9829 to (Evaluate:53Eht0089)  Requested for: 88BLV2575; Last    Rx:72Xtp3053 Ordered   12  ProAir  (90 Base) MCG/ACT Inhalation Aerosol Solution; INHALE 2 PUFFS    EVERY 6 HOURS AS NEEDED; Therapy: 56Efl3040 to (Evaluate:71Zfn9609); Last Rx:19Hwr4448 Ordered   13  Sertraline HCl - 100 MG Oral Tablet; TAKE 1/2 TABLET DAILY; Therapy: 90TIR4472 to (Evaluate:67Tcw8317)  Requested for: 70Ude1228 Recorded   14  Simvastatin 20 MG Oral Tablet; TAKE 1 TABLET AT BEDTIME; Therapy: 81Tqe3386 to (Last Rx:22Svt6045)  Requested for: 55Fec0116 Ordered   15  Tamsulosin HCl - 0 4 MG Oral Capsule; TAKE 1 CAPSULE AT BEDTIME; Therapy: 35EZD8983 to (Evaluate:61Ztg6049)  Requested for: 91GVZ5425; Last    Rx:57Uxy7396 Ordered   16  Zolpidem Tartrate 10 MG Oral Tablet; TAKE 1 TABLET AT BEDTIME AS NEEDED; Therapy: 09ZFX6759 to (Evaluate:55Lse0207)  Requested for: 46NQT7692; Last    Rx:30Zpv3631 Ordered    The medication list was reviewed and updated today  Allergies    1  Bactrim DS TABS   2  Codeine Derivatives    Vitals  Signs    Heart Rate: 72  Respiration: 18  Systolic: 281  Diastolic: 80  Height: 5 ft 7 in  Weight: 180 lb   BMI Calculated: 28 19  BSA Calculated: 1 93    Physical Exam    Constitutional   General appearance: No acute distress, well appearing and well nourished  Head and Face   Head and face: Normal     Palpation of the face and sinuses: No sinus tenderness  Eyes   Conjunctiva and lids: No erythema, swelling or discharge  Ears, Nose, Mouth, and Throat   External inspection of ears and nose: Normal     Otoscopic examination: Tympanic membranes translucent with normal light reflex  Canals patent without erythema  Oropharynx: Normal with no erythema, edema, exudate or lesions  Neck   Neck: Supple, symmetric, trachea midline, no masses  Thyroid: Normal, no thyromegaly      Pulmonary   Respiratory effort: No increased work of breathing or signs of respiratory distress  Auscultation of lungs: Clear to auscultation  Cardiovascular   Auscultation of heart: Normal rate and rhythm, normal S1 and S2, no murmurs  Carotid pulses: 2+ bilaterally  Examination of extremities for edema and/or varicosities: Normal     Abdomen   Abdomen: Non-tender, no masses  Liver and spleen: No hepatomegaly or splenomegaly  Lymphatic   Palpation of lymph nodes in neck: No lymphadenopathy  Musculoskeletal   Gait and station: Abnormal   antalgic  Inspection/palpation of joints, bones, and muscles: Abnormal   Limited range of motion of his right hip  Muscle strength/tone: Normal     Skin   Skin and subcutaneous tissue: Normal without rashes or lesions  Neurologic   Cranial nerves: Cranial nerves 2-12 intact  Cortical function: Normal mental status  Psychiatric   Judgment and insight: Normal     Orientation to person, place and time: Normal     Recent and remote memory: Intact  Mood and affect: Normal        Assessment    1  Arthritis of right hip (716 95) (M16 11)   2  Hypertension (401 9) (I10)   3  Chronic lymphocytic leukemia (204 10) (C91 10)   4  Anemia (285 9) (D64 9)   5  Pulmonary hypertension (416 8) (I27 2)   6  Hyponatremia (276 1) (E87 1)   7  Iron deficiency anemia (280 9) (D50 9)   8  Prolonged QT interval (794 31) (R94 31)   9  Depression (311) (F32 9)    Plan  Hypertension    · Lisinopril 40 MG Oral Tablet; TAKE 1 TABLET DAILY   · Minoxidil 10 MG Oral Tablet; TAKE ONE TABLET BY MOUTH DAY  PMH: History of hypercholesterolemia    · Simvastatin 20 MG Oral Tablet; TAKE 1 TABLET AT BEDTIME  Prolonged QT interval    · 1 Eva Roberts MD, Henok Guzman  (Cardiology) Co-Management  *  Status: Active  Requested for:  92BXQ3841  Care Summary provided  : Yes    Discussion/Summary  Surgical Clearance: He is at a LOW TO MODERATE risk from a cardiovascular standpoint at this time without any additional cardiac testing   Reevaluation needed, if he should present with symptoms prior to surgery/procedure  Patient presents today for preoperative clearance  His preop EKG did reveal QT prolongation which has not been mentioned on previous EKGs  This may be due to his sertraline, which I cut in half at this point as his depression is stable  I'm also going to have him see cardiology to be on the safe side  He has a pending echocardiogram as ordered by Dr Aylin Kay for his recent noted pulmonary hypertension on stress echocardiogram   It is worth noting that his stress echocardiogram was otherwise normal   He has a history of severe hypertension which is well-controlled on 4 agents  I would have him hold minoxidil the day of the surgery  He will hold his aspirin, naproxen, fish oil and milk thistle one week prior to the procedure  He does currently drink about 5 or 6 beers per day  He is going to hold off from drinking one week prior to the procedure to avoid any concerns of alcohol withdrawal   Blood work was normal for the most part  He does have known CLL and his CBC was stable  I spoke with Dr Lior Barrett and he has no specific concerns regarding his CLL  He has a history of hyponatremia which improved significantly on his recent labs  This may be coming from his sertraline, so we will continue to monitor this postoperatively on one half dose of the sertraline (50 mg)  He will have his flu shot today  Addendum 9/27/17 - Patient cleared by cardiology and is CLEAR for hip surgery  End of Encounter Meds    1  Sertraline HCl - 100 MG Oral Tablet; TAKE 1/2 TABLET DAILY; Therapy: 97YQU3569 to (Evaluate:86Vkw3260)  Requested for: 48Zmk1582 Recorded    2  Omeprazole 40 MG Oral Capsule Delayed Release; 1 PO QD; Therapy: 82GTY9860 to (Evaluate:82Hgy6032)  Requested for: 96LZY9844; Last   Rx:45Dbs3857 Ordered    3  AmLODIPine Besylate 5 MG Oral Tablet; TAKE 1 TABLET BY MOUTH DAILY;    Therapy: 77Mor0875 to (Evaluate:87Nej2904)  Requested for: 03Ltm6834; Last   Rx:22Aug2017 Ordered   4  Carvedilol 12 5 MG Oral Tablet; TAKE 1 TABLET TWICE DAILY; Therapy: 76GHR3473 to (Evaluate:17Aug2017)  Requested for: 86Kti5840; Last   Rx:40Qar8094 Ordered   5  Lisinopril 40 MG Oral Tablet; TAKE 1 TABLET DAILY; Therapy: 06GCU9305 to (Evaluate:96Dyv3309)  Requested for: 38Bvf3769; Last   Rx:17Eod9239; Status: UNAUTHORIZED - Requires Signature Ordered   6  Minoxidil 10 MG Oral Tablet; TAKE ONE TABLET BY MOUTH DAY; Therapy: 78OQJ3466 to (Evaluate:10Sep2018)  Requested for: 42Tqo4436; Last   Rx:15Sep2017; Status: UNAUTHORIZED - Requires Signature Ordered    7  Zolpidem Tartrate 10 MG Oral Tablet; TAKE 1 TABLET AT BEDTIME AS NEEDED; Therapy: 10JMX9975 to (Evaluate:03Sep2017)  Requested for: 95NHX0087; Last   Rx:07Mar2017 Ordered    8  Naproxen 500 MG Oral Tablet; TAKE 1 TABLET EVERY 12 HOURS WITH FOOD AS   NEEDED; Therapy: 72VPZ3581 to (Evaluate:25Apr2017)  Requested for: 41ZFZ9471; Last   Rx:25Jan2017 Ordered    9  Montelukast Sodium 10 MG Oral Tablet (Singulair); TAKE 1 TABLET DAILY  Requested   for: 54WNP0822; Last Rx:26Jan2017 Ordered    10  Simvastatin 20 MG Oral Tablet; TAKE 1 TABLET AT BEDTIME; Therapy: 29Nkr8843-(Last:15Sep2017)  Requested for: 14Ome5518; Last    Rx:60Zcv5500; Status: UNAUTHORIZED - Requires Signature Ordered    11  Fluticasone Propionate 50 MCG/ACT Nasal Suspension; INSTILL 2 SPRAY Daily; Therapy: 83INH2520 to (Evaluate:08Jan2016)  Requested for: 57HSZ5990; Last    Rx:13Jan2015 Ordered   12  ProAir  (90 Base) MCG/ACT Inhalation Aerosol Solution; INHALE 2 PUFFS    EVERY 6 HOURS AS NEEDED; Therapy: 23Pid7616 to (Evaluate:05Sep2014); Last Rx:22Aug2014 Ordered    13  Tamsulosin HCl - 0 4 MG Oral Capsule; TAKE 1 CAPSULE AT BEDTIME; Therapy: 53DPG2055 to (Evaluate:20Gha8911)  Requested for: 58HMW4505; Last    Rx:00Yit2325 Ordered    14  Ecotrin Low Strength 81 MG Oral Tablet Delayed Release; TAKE 1 TABLET DAILY;     Therapy: (Recorded:33Dfr4858) to Recorded   15  Fish Oil 1000 MG Oral Capsule; One daily; Therapy: (Recorded:12Vme6186) to Recorded   16   Milk Thistle CAPS Recorded    Signatures   Electronically signed by : MONICA Byrd ; Sep 27 2017 11:32AM EST                       (Author)

## 2018-01-12 VITALS
BODY MASS INDEX: 28.56 KG/M2 | WEIGHT: 182 LBS | SYSTOLIC BLOOD PRESSURE: 140 MMHG | HEART RATE: 72 BPM | HEIGHT: 67 IN | OXYGEN SATURATION: 97 % | TEMPERATURE: 99.3 F | RESPIRATION RATE: 18 BRPM | DIASTOLIC BLOOD PRESSURE: 80 MMHG

## 2018-01-13 VITALS
HEIGHT: 67 IN | DIASTOLIC BLOOD PRESSURE: 82 MMHG | BODY MASS INDEX: 28.25 KG/M2 | SYSTOLIC BLOOD PRESSURE: 148 MMHG | RESPIRATION RATE: 18 BRPM | TEMPERATURE: 98.9 F | HEART RATE: 72 BPM | WEIGHT: 180 LBS | OXYGEN SATURATION: 97 %

## 2018-01-13 VITALS
SYSTOLIC BLOOD PRESSURE: 130 MMHG | HEART RATE: 60 BPM | DIASTOLIC BLOOD PRESSURE: 70 MMHG | RESPIRATION RATE: 18 BRPM | WEIGHT: 182 LBS | BODY MASS INDEX: 28.56 KG/M2 | HEIGHT: 67 IN

## 2018-01-13 VITALS
HEART RATE: 66 BPM | SYSTOLIC BLOOD PRESSURE: 190 MMHG | HEIGHT: 67 IN | BODY MASS INDEX: 28.25 KG/M2 | DIASTOLIC BLOOD PRESSURE: 90 MMHG | WEIGHT: 180 LBS

## 2018-01-13 VITALS
WEIGHT: 181.05 LBS | SYSTOLIC BLOOD PRESSURE: 130 MMHG | DIASTOLIC BLOOD PRESSURE: 70 MMHG | HEIGHT: 67 IN | BODY MASS INDEX: 28.42 KG/M2 | HEART RATE: 75 BPM | RESPIRATION RATE: 18 BRPM

## 2018-01-14 VITALS
WEIGHT: 180 LBS | HEIGHT: 67 IN | BODY MASS INDEX: 28.25 KG/M2 | RESPIRATION RATE: 18 BRPM | SYSTOLIC BLOOD PRESSURE: 138 MMHG | DIASTOLIC BLOOD PRESSURE: 80 MMHG | HEART RATE: 72 BPM

## 2018-01-14 VITALS
DIASTOLIC BLOOD PRESSURE: 84 MMHG | SYSTOLIC BLOOD PRESSURE: 166 MMHG | WEIGHT: 174.13 LBS | TEMPERATURE: 98 F | HEIGHT: 67 IN | BODY MASS INDEX: 27.33 KG/M2

## 2018-01-14 VITALS
RESPIRATION RATE: 18 BRPM | OXYGEN SATURATION: 98 % | BODY MASS INDEX: 27.62 KG/M2 | HEART RATE: 64 BPM | SYSTOLIC BLOOD PRESSURE: 120 MMHG | WEIGHT: 176 LBS | HEIGHT: 67 IN | DIASTOLIC BLOOD PRESSURE: 70 MMHG

## 2018-01-15 VITALS
BODY MASS INDEX: 28.25 KG/M2 | OXYGEN SATURATION: 98 % | DIASTOLIC BLOOD PRESSURE: 88 MMHG | RESPIRATION RATE: 18 BRPM | HEIGHT: 67 IN | TEMPERATURE: 98.3 F | WEIGHT: 180 LBS | SYSTOLIC BLOOD PRESSURE: 132 MMHG | HEART RATE: 70 BPM

## 2018-01-15 NOTE — MISCELLANEOUS
Message   Recorded as Task   Date: 05/16/2017 10:17 AM, Created By: Ioana Gallagher   Task Name: Medical Complaint Callback   Assigned To: Lowell Soares   Regarding Patient: Ray Pina, Status: Active   CommentWandy Morgan - 16 May 2017 10:17 AM     TASK CREATED  Caller: Self; Medical Complaint; [phone] (Home); [phone] (Mobile Phone)  Patient called today about the referral for pain management  He said that he is not going to see Dr Ira Fink  When scheduling called him, they told him that they needed to get his records from [facility] that he said he has not been to for 5-6 years  He told them that he will not call them for records because they are irrelevant  He said she was really nasty and now he is refusing to see them  So he wanted to pass that along and also wants to know what to do now  He said also if you want to call him, call his mobile phone not his wife  Pravin Castaneda Jr - 16 May 2017 1:52 PM     TASK REPLIED TO: Previously Assigned To Olga Mcdonald                      Please explain to the patient is that prior pain management records are extremely relevant even 5 and 6 years later  He can either return to [facility] pain [facility] or we can help him get set up with [facility] - 17 May 2017 9:02 AM     TASK REASSIGNED: Previously Assigned To Florecita Liu - 17 May 2017 9:28 AM     TASK REASSIGNED: Previously Assigned To Kamryn Whitney - 17 May 2017 2:24 PM     TASK REPLIED TO: Previously Assigned To Pravin Castaneda Jr  Pt said he is going to hold off on the pain management  He said that "it is just a Band-Aid" and is only a temporary fix  He said he will reconsider going when the pain becomes severe  He also doesn't want to go because he said with his insurance it would not be fully covered and he can not afford to pay more bills      He also wanted to let you know that he is going for his iron transfusion today and that he will also go see the pulmonologist as you requested  062Gloria Hayes - 18 May 2017 2:17 PM     TASK REPLIED TO: Previously Assigned To [address] [place] [address]  Please create a note with all of this correspondence  Fernanda Villegas - 19 May 2017 8:16 AM     TASK REASSIGNED: Previously Assigned To Dodie Villegason        Active Problems    1  Allergic rhinitis due to pollen (477 0) (J30 1)   2  Anemia (285 9) (D64 9)   3  Anxiety (300 00) (F41 9)   4  Chronic lymphocytic leukemia (204 10) (C91 10)   5  Depression (311) (F32 9)   6  Diverticulitis of colon (562 11) (K57 32)   7  DJD (degenerative joint disease), lumbar (721 3) (M47 816)   8  Dysuria (788 1) (R30 0)   9  Enlarged prostate without lower urinary tract symptoms (luts) (600 00) (N40 0)   10  Erectile dysfunction of non-organic origin (302 72) (F52 21)   11  Esophageal reflux (530 81) (K21 9)   12  Flu vaccine need (V04 81) (Z23)   13  Hematuria (599 70) (R31 9)   14  Hip pain, right (719 45) (M25 551)   15  Hypertension (401 9) (I10)   16  Hyponatremia (276 1) (E87 1)   17  Incomplete emptying of bladder (788 21) (R33 9)   18  Inguinal hernia (550 90) (K40 90)   19  Insomnia (780 52) (G47 00)   20  Other muscle spasm (728 85) (M62 838)   21  Pulmonary hypertension (416 8) (I27 2)   22  Shortness of breath on exertion (786 05) (R06 02)   23  Special screening, prostate cancer (V76 44) (Z12 5)   24  Spinal stenosis of lumbar region (724 02) (M48 06)   25  Well adult on routine health check (V70 0) (Z00 00)    Current Meds   1  AmLODIPine Besylate 5 MG Oral Tablet; TAKE 1 TABLET BY MOUTH DAILY; Therapy: 30Isa5220 to (Evaluate:83Vov5128)  Requested for: 32Dvs3737; Last   Rx:14Qth6038 Ordered   2  Carvedilol 12 5 MG Oral Tablet; TAKE 1 TABLET TWICE DAILY; Therapy: 85MKM4280 to (Evaluate:64Nic7852)  Requested for: 26Gkr3122; Last   Rx:12Nmy9021 Ordered   3   Ecotrin Low Strength 81 MG Oral Tablet Delayed Release; Therapy: (Recorded:85Dbc5352) to Recorded   4  Fish Oil 1000 MG Oral Capsule Recorded   5  Fluticasone Propionate 50 MCG/ACT Nasal Suspension; INSTILL 2 SPRAY Daily; Therapy: 53VMG2497 to (Evaluate:08Jan2016)  Requested for: 64EYC1058; Last   Rx:13Jan2015 Ordered   6  Lisinopril 40 MG Oral Tablet; TAKE 1 TABLET DAILY; Therapy: 74PQM9927 to (Evaluate:17Aug2017)  Requested for: 13Imu9722; Last   Rx:00Vzm7812 Ordered   7  Milk Thistle CAPS Recorded   8  Minoxidil 10 MG Oral Tablet; TAKE ONE TABLET BY MOUTH DAY; Therapy: 59VID2285 to (Evaluate:17Aug2017)  Requested for: 81Xxe7494; Last   Rx:22Aug2016 Ordered   9  Montelukast Sodium 10 MG Oral Tablet (Singulair); TAKE 1 TABLET DAILY  Requested   for: 60XQS2767; Last Rx:26Jan2017 Ordered   10  Naproxen 500 MG Oral Tablet; TAKE 1 TABLET EVERY 12 HOURS WITH FOOD AS    NEEDED; Therapy: 77QAK4900 to (Evaluate:25Apr2017)  Requested for: 54JLW3648; Last    Rx:25Jan2017 Ordered   11  Omeprazole 40 MG Oral Capsule Delayed Release; 1 PO QD; Therapy: 65MAM7198 to (Evaluate:17Aug2017)  Requested for: 28Rdf1312; Last    Rx:13Upw1895 Ordered   12  ProAir  (90 Base) MCG/ACT Inhalation Aerosol Solution; INHALE 2 PUFFS    EVERY 6 HOURS AS NEEDED; Therapy: 12Gcf1403 to (Evaluate:05Sep2014); Last Rx:51Zmy8945 Ordered   13  Sertraline HCl - 100 MG Oral Tablet; TAKE 1 TABLET DAILY; Therapy: 16VTB9739 to (Evaluate:17Aug2017)  Requested for: 72Jiv0135; Last    Rx:56Guu8225 Ordered   14  Simvastatin 20 MG Oral Tablet; TAKE 1 TABLET AT BEDTIME; Therapy: 89Mno2140 to (Last Rx:94Mrz9692)  Requested for: 77Sjx8416 Ordered   15  Tamsulosin HCl - 0 4 MG Oral Capsule; TAKE 1 CAPSULE AT BEDTIME; Therapy: 93DZC5351 to (Evaluate:17Aug2017)  Requested for: 85Pbd1329; Last    Rx:50Xvo7169 Ordered   16  Zolpidem Tartrate 10 MG Oral Tablet; TAKE 1 TABLET AT BEDTIME AS NEEDED;     Therapy: 47SDT6275 to (Evaluate:90Fek8237)  Requested for: 09PHE1452; Last SY:66XHJ9112 Ordered    Allergies    1  Bactrim DS TABS   2   Codeine Derivatives    Signatures   Electronically signed by : Mendez Huitron, ; May 19 2017  8:30AM EST                       (Author)

## 2018-01-15 NOTE — RESULT NOTES
Verified Results  (1) LIPID PANEL, FASTING 14Ugx4987 09:57AM Jeffrey Olivo Order Number: [id number]      Triglyceride:         Normal              <150 mg/dl       Borderline High    150-199 mg/dl       High               200-499 mg/dl       Very High          >499 mg/dl  Cholesterol:         Desirable        <200 mg/dl      Borderline High  200-239 mg/dl      High             >239 mg/dl  HDL Cholesterol:        High    >59 mg/dL      Low     <41 mg/dL  LDL CALCULATED:    This screening LDL is a calculated result  It does not have the accuracy of the Direct Measured LDL in the monitoring of patients with hyperlipidemia and/or statin therapy  Direct Measure LDL (YYM864) must be ordered separately in these patients       Test Name Result Flag Reference   CHOLESTEROL 145 mg/dL     HDL,DIRECT 76 mg/dL H 40-60   LDL CHOLESTEROL CALCULATED 61 mg/dL  0-100   TRIGLYCERIDES 42 mg/dL  <=150     (1) PSA (SCREEN) (Dx V76 44 Screen for Prostate Cancer) 35RAA5967 09:57AM Arabellaberthajose Osmin Pam Order Number: [id number]     Order Number: [id number]     Test Name Result Flag Reference   PROSTATE SPECIFIC ANTIGEN 0 1 ng/mL  0 0-4 0

## 2018-01-16 NOTE — RESULT NOTES
Verified Results  ECHO STRESS TEST W CONTRAST IF INDICATED [id number] 07:48AM Leonel Mary     Test Name Result Flag Reference   ECHO STRESS TEST W CONTRAST IF INDICATED (Report)     [facility]   [facility]  [place], [address]   [phone]     Exercise Stress Echocardiography     Study date: 01-Mar-2017     Patient: Micheal Baker   MR number: [id number]   Account number: [de-identified]   : 1950   Age: 61 years   Gender: Male   Study date: 01-Mar-2017   Status: Outpatient   [facility]   Height: 67 in   Weight: 181 9 lb   BP: 144// 80 mmHg     Indications: Shortness of breath  Detection of coronary artery disease  Diagnosis: R06 02 - Shortness of breath     Sonographer: HARJINDER Cano   Primary Physician: Dex Holland MD   Group: [facility]   RN: Evette Escobar RN BSN   Interpreting Physician: Marlene Kilgore DO     IMPRESSIONS:   No ECG, or echocardiographic evidence of myocardial ischemia  Moderate pulmonary hypertension noted at rest with pulmonary artery systolic pressure   ~ 17UCDO  Post exercise pulmonary presures not evaluated  Normal right heart size and systolic function  Left ventricular systolic function was normal  Diagnostic sensitivity was limited by blunted heart rate response to exercise (62% MPHR   achieved), likely due to beta blocker treatment  SUMMARY     STRESS RESULTS:   Duration of exercise was 6 min  Maximal work rate was 7 METs  Maximal heart rate during stress was 98 bpm ( 62 % of maximal predicted heart rate)  Target heart rate was not achieved  There was resting hypertension with an appropriate blood pressure response to stress  There was no chest pain during stress  ECG CONCLUSIONS:   The stress ECG was negative for ischemia  BASELINE:   There were no regional wall motion abnormalities     Estimated left ventricular ejection fraction was 60 %       PEAK STRESS:   There were no regional wall motion abnormalities  ECHO CONCLUSIONS:   There was no echocardiographic evidence for stress-induced ischemia  HISTORY: The patient is a 61year old male  Chest pain status: no chest pain  Other symptoms: dyspnea of recent onset  Coronary artery disease risk factors: dyslipidemia, hypertension, and family history of coronary artery disease  Cardiovascular history: none significant  Medications: a beta blocker, a calcium channel blocker, and a lipid lowering agent  REST ECG: Normal sinus rhythm  Normal baseline ECG  PROCEDURE: The study was performed in the stress lab  The study was performed in the [address] [place] [address]  The procedure was explained to the patient and informed consent was obtained  Treadmill exercise testing was performed,   using the Vanesa protocol  Stress and rest echocardiographic evaluation with 2D imaging, spectral Doppler, and color Doppler was performed from multiple acoustic windows for evaluation of ventricular function  VANESA PROTOCOL:   HR bpm SBP mmHg DBP mmHg Symptoms   Baseline 65 144 80 none   Stage 1 93 162 82 --   Stage 2 166 76 -- --   Immediate 98 166 76 --   Recovery 2 79 148 70 --   Recovery 5 68 140 72 --     Resting SPO2 97%  Peak SPO2 94%  MEDICATIONS GIVEN: No medications or fluids given  STRESS RESULTS: Duration of exercise was 6 min  The patient exercised to protocol stage 2  Maximal work rate was 7 METs  Maximal heart rate during stress was 98 bpm ( 62 % of maximal predicted heart rate)  Target heart rate was not   achieved  The heart rate response to stress was blunted  Maximal systolic blood pressure during stress was 166 mmHg  There was resting hypertension with an appropriate blood pressure response to stress  The rate-pressure product for the   peak heart rate and blood pressure was 61169  There was no chest pain during stress   The stress test was terminated due to achievement of maximal (symptom limited) exercise  ECG CONCLUSIONS: The stress ECG was negative for ischemia  There were no stress arrhythmias or conduction abnormalities  STRESS 2D ECHO RESULTS:     BASELINE: There were no regional wall motion abnormalities  Left ventricular size was normal  Overall left ventricular systolic function was normal  Estimated left ventricular ejection fraction was 60 %   PEAK STRESS: There were no regional wall motion abnormalities  There was an appropriate reduction in left ventricular size  There was an appropriate augmentation in LV function  OTHER ECHO FINDINGS: Mild mitral and aortic regurgitation  Moderate pulmonary hypertension, PASP ~ 55mmHg  ECHO CONCLUSIONS: There was no echocardiographic evidence for stress-induced ischemia  The image quality was good       SYSTEM MEASUREMENT TABLES     CW   TR Vmax: 3 6 m/s   TR maxP 1 mmHg     Prepared and electronically signed by     Bia Luke DO   Signed 01-Mar-2017 12:49:22

## 2018-01-16 NOTE — MISCELLANEOUS
Assessment    1  Hypertension (401 9) (I10)   2  Chronic lymphocytic leukemia (204 10) (C91 10)   3  Hyponatremia (276 1) (E87 1)   4  Pulmonary hypertension (416 8) (I27 20)    Plan  Exercise counseling    · There are many exercise options for seniors ; Status:Complete - Retrospective By  Protocol Authorization;   Done: [id number]   Last Updated Jay Nicolas; 10/13/2017 2:46:45 PM;Ordered;  For:Exercise counseling; Ordered By:McGorry Burke Kayser; Health Maintenance    · *VB - Fall Risk Assessment  (Dx Z13 89 Screen for Neurologic Disorder);  Status:Complete - Retrospective By Protocol Authorization;   Done: 12SJF4987 02:46PM   Performed: In Office; [phone]; Last Updated Jay Nicolas; 10/13/2017 2:46:45 PM;Ordered;  For:Health Maintenance; Ordered By:McGorry Burke Kayser;   · *VB - Urinary Incontinence Screen (Dx Z13 89 Screen for UI); Status:Complete -  Retrospective By Protocol Authorization;   Done: 77SHA8017 02:46PM   Performed: In Office; [phone]; Last Updated Jay Nicolas; 10/13/2017 2:46:45 PM;Ordered;  For:Health Maintenance; Ordered By:McGorry Burke Kayser; Discussion/Summary  Discussion Summary:   The patient presents today for a postoperative visit for his recent total hip replacement  His blood pressure has remained well controlled throughout the perioperative period  He is tolerating to aspirin daily at the direction of his orthopedist  Emma Avila with his current blood pressure medications and follow-up in about 2 months time  CLL remains stable as well  He had a mildly abnormal EKG and I had him see Cardiology  There is a question of some pulmonary hypertension on 1 of his echocardiograms  He is going to follow with Cardiology annually  His breathing seems to be quite stable at this point  I would recommend routine exercise and continued weight loss  I am very proud of him at this point        History of Present Illness  TCM Communication [facility]: The patient is being contacted for follow-up after hospitalization  Hospital records were reviewed  He was hospitalized at [facility]  The date of admission:, date of discharge: 10/02/2017  He was discharged to home  Medications were not reviewed today  Counseling was provided to the patient  Communication performed and completed by telma   HPI: Patient presents today for transition of care  He underwent total hip replacement 2 weeks ago [facility] in [facility]  He had a bit of confusion postoperatively due to narcotics, but otherwise did extremely well  His home blood pressures have remained very well controlled  He is having no severe pain and is only taking Tylenol at this point  He denies any current issues with chest pain, shortness of breath, palpitations or lightheadedness  He has a history of depression which has been quite stable  He is actually feeling elated with his lack of pain  Reflux remained stable on omeprazole  He denies any heartburn or dysphagia  He has some blood work preoperatively which revealed improvement of his hyponatremia  He did quit drinking for about 12 days prior to his procedure but is planning on having a few beers at this point  I did have him see Cardiology for an abnormal preoperative EKG  Workup including echocardiogram was okay  He may have some early stage pulmonary hypertension  He continues to follow with Oncology for his CLL  He has had no unintentional weight loss or night sweats  Review of Systems  Complete-Male:   Constitutional: recent weight loss, but no fever, not feeling poorly, no recent weight gain, no chills and not feeling tired  Eyes: no eye pain and no eyesight problems  ENT: no sore throat and no hoarseness  Cardiovascular: the heart rate was not slow, no chest pain, no intermittent leg claudication, the heart rate was not fast, no palpitations and no extremity edema     Respiratory: no shortness of breath, no cough, no orthopnea, no wheezing and no shortness of breath during exertion  Gastrointestinal: no abdominal pain, no nausea, no vomiting, no constipation and no diarrhea  Genitourinary: no dysuria, no urinary hesitancy and no incontinence  Musculoskeletal: no limb pain, no myalgias and no joint stiffness  Integumentary: no rashes, no itching, no dry skin and no skin lesions  Neurological: no headache and no numbness  Psychiatric: no anxiety and no depression  Hematologic/Lymphatic: no tendency for easy bleeding and no tendency for easy bruising  Active Problems    1  Abnormal EKG (794 31) (R94 31)   2  Abnormal stress echo (794 39) (R94 39)   3  Allergic rhinitis due to pollen (477 0) (J30 1)   4  Anemia (285 9) (D64 9)   5  Anxiety (300 00) (F41 9)   6  Arthritis of right hip (716 95) (M16 11)   7  Chronic lymphocytic leukemia (204 10) (C91 10)   8  Depression (311) (F32 9)   9  Diverticulitis of colon (562 11) (K57 32)   10  DJD (degenerative joint disease), lumbar (721 3) (M47 816)   11  Encounter for pre-operative cardiovascular clearance (V72 81) (Z01 810)   12  Enlarged prostate without lower urinary tract symptoms (luts) (600 00) (N40 0)   13  Erectile dysfunction of non-organic origin (302 72) (F52 21)   14  Esophageal reflux (530 81) (K21 9)   15  Flu vaccine need (V04 81) (Z23)   16  Hematuria (599 70) (R31 9)   17  Hip pain, right (719 45) (M25 551)   18  Hypertension (401 9) (I10)   19  Hyponatremia (276 1) (E87 1)   20  Inguinal hernia (550 90) (K40 90)   21  Insomnia (780 52) (G47 00)   22  Iron deficiency anemia (280 9) (D50 9)   23  Other muscle spasm (728 85) (M62 838)   24  Prolonged QT interval (794 31) (R94 31)   25  Pulmonary hypertension (416 8) (I27 20)   26  Special screening, prostate cancer (V76 44) (Z12 5)   27  Spinal stenosis of lumbar region (724 02) (M48 061)   28  Well adult on routine health check (V70 0) (Z00 00)    Past Medical History    1  History of Cough (786 2) (R05)   2   History of Depression (311) (F32 9) 3  History of acute bronchitis (V12 69) (Z87 09)   4  History of chest pain (V13 89) (Z87 898)   5  History of concussion (V15 52) (Z87 820)   6  History of falling (V15 88) (Z91 81)   7  History of gastrointestinal hemorrhage (V12 79) (Z87 19)   8  History of hiatal hernia (V12 79) (Z87 19)   9  History of hyperlipidemia (V12 29) (Z86 39)   10  History of pneumothorax (V12 69) (Z87 09)   11  History of prostatitis (V13 89) (Z87 438)   12  History of upper respiratory infection (V12 09) (Z87 09)   13  History of upper respiratory infection (V12 09) (Z87 09)   14  History of upper respiratory infection (V12 09) (Z87 09)   15  History of Microscopic hematuria (599 72) (R31 29)   16  History of Pain, upper back (724 5) (M54 9)   17  Personal history of urinary tract infection (V13 02) (Z87 440)   18  History of Shoulder joint pain, unspecified laterality   19  History of Urinary retention (788 20) (R33 9)   20  History of Urinary Tract Infection (V13 02)   21  History of UTI (lower urinary tract infection) (599 0) (N39 0)    Surgical History    1  History of Diagnostic Cystoscopy   2  History of Hernia Repair   3  History of Spinal Anesthesia Epidural   4  History of Total Hip Replacement   5  History of Transurethral Resection Of Prostate (TURP)   6  History of Wrist Surgery  Surgical History Reviewed: The surgical history was reviewed and updated today  Family History  Father    1  Family history of Acute Myocardial Infarction (V17 3)   2  Family history of Father  At Age 61  Brother    1  Family history of Diabetes Mellitus (V18 0)   4  Family history of Prostate Cancer (D30 29)  Family History Reviewed: The family history was reviewed and updated today  Social History    · Alcohol Use (History)   · Former cigar smoker (T84 89) (H55 917)   · Former smoker (X63 17) (E95 318)   · Marital History - Currently   Social History Reviewed: The social history was reviewed and updated today  Current Meds   1  Adult Low Dose Aspirin 81 MG TABS; TAKE 1 TABLET DAILY; Therapy: (Recorded:13Oct2017) to Recorded   2  AmLODIPine Besylate 5 MG Oral Tablet; TAKE 1 TABLET BY MOUTH DAILY; Therapy: 25Tnh3604 to (Evaluate:17Aug2018)  Requested for: 60Kem8656; Last   Rx:63Brh3674 Ordered   3  Carvedilol 12 5 MG Oral Tablet; TAKE 1 TABLET TWICE DAILY; Therapy: 60EQP4811 to (Evaluate:17Aug2017)  Requested for: 43Wjq9901; Last   Rx:42Slv3541 Ordered   4  Ecotrin Low Strength 81 MG Oral Tablet Delayed Release; TAKE 1 TABLET DAILY; Therapy: (Recorded:17Aug2017) to Recorded   5  Fish Oil 1000 MG Oral Capsule; One daily; Therapy: (Recorded:17Aug2017) to Recorded   6  Lisinopril 40 MG Oral Tablet; TAKE 1 TABLET DAILY; Therapy: 04YFK4348 to (Evaluate:10Sep2018)  Requested for: 43Bgi5890; Last   Rx:69Zbu3056 Ordered   7  Milk Thistle CAPS Recorded   8  Minoxidil 10 MG Oral Tablet; TAKE ONE TABLET BY MOUTH DAY; Therapy: 94ZVA9832 to (Evaluate:51Jnj8508)  Requested for: 10Ukk6653; Last   Rx:50Vdc5597 Ordered   9  Montelukast Sodium 10 MG Oral Tablet; TAKE 1 TABLET DAILY  Requested for:   11NBX9818; Last OB:95OHL9153 Ordered   10  Omeprazole 40 MG Oral Capsule Delayed Release; 1 PO QD; Therapy: 87IFC4797 to (Evaluate:25Rmc1328)  Requested for: 73BIT4799; Last    Rx:69Ldr1643 Ordered   11  Sertraline HCl - 100 MG Oral Tablet; TAKE 1/2 TABLET DAILY; Therapy: 79CRE1891 to (Evaluate:61Pvl3135)  Requested for: 65Feo4406 Recorded   12  Simvastatin 20 MG Oral Tablet; TAKE 1 TABLET AT BEDTIME; Therapy: 17Zso8201 to (Last Rx:56Jzk6110)  Requested for: 61Rsb6396 Ordered   13  Tamsulosin HCl - 0 4 MG Oral Capsule; TAKE 1 CAPSULE AT BEDTIME; Therapy: 93MKN4726 to (Evaluate:41Wca0066)  Requested for: 13DAX7973; Last    Rx:75Isn7707 Ordered   14  Zolpidem Tartrate 10 MG Oral Tablet; TAKE 1 TABLET AT BEDTIME AS NEEDED;     Therapy: 41QKE6073 to (Evaluate:48Fow3401)  Requested for: 78IWS4692; Last    [id number] Ordered  Medication List Reviewed: The medication list was reviewed and updated today  Allergies    1  Bactrim DS TABS   2  Codeine Derivatives    Vitals  Signs   Recorded: 89DUF6542 92:64KJ   Systolic: 636  Diastolic: 70  Recorded: 59XZI5427 02:42PM   Heart Rate: 64  Respiration: 18  Systolic: 709  Diastolic: 80  Height: 5 ft 7 in  Weight: 176 lb   BMI Calculated: 27 57  BSA Calculated: 1 92  O2 Saturation: 98, RA    Physical Exam    Constitutional   General appearance: No acute distress, well appearing and well nourished  Eyes   Conjunctiva and lids: No swelling, erythema, or discharge  Pupils and irises: Equal, round and reactive to light  Pulmonary   Respiratory effort: No increased work of breathing or signs of respiratory distress  Auscultation of lungs: Clear to auscultation, equal breath sounds bilaterally, no wheezes, no rales, no rhonci  Cardiovascular   Auscultation of heart: Normal rate and rhythm, normal S1 and S2, without murmurs  Examination of extremities for edema and/or varicosities: Normal     Carotid pulses: Normal     Abdomen   Abdomen: Non-tender, no masses  Liver and spleen: No hepatomegaly or splenomegaly  Lymphatic   Palpation of lymph nodes in neck: No lymphadenopathy  Musculoskeletal   Gait and station: Normal     Skin   Skin and subcutaneous tissue: Normal without rashes or lesions  Surgical site is clean dry and intact  Sutures have been removed     Psychiatric   Orientation to person, place and time: Normal     Mood and affect: Normal          Results/Data  *VB - Fall Risk Assessment  (Dx Z13 89 Screen for Neurologic Disorder) 00GMV5230 02:46PM McGorry Jeferson Brenda     Test Name Result Flag Reference   Falls Risk      No falls in the past year     *VB - Urinary Incontinence Screen (Dx Z13 89 Screen for UI) 08AME8186 02:46PM McGorry Jeferson Brenda     Test Name Result Flag Reference   Urinary Incontinence Assessment 30ATQ0687         Future Appointments    Date/Time Provider Specialty Site   02/22/2018 09:20 AM MONICA Perez   Hematology Oncology [facility]     Signatures   Electronically signed by : MONICA Batres ; Oct 13 2017  3:12PM EST                       (Author)

## 2018-01-18 NOTE — RESULT NOTES
Verified Results  (1) LIPID PANEL, FASTING 27ZYP8896 09:40AM Jeffrey Olivo Order Number: [id number]     Test Name Result Flag Reference   CHOLESTEROL 141 mg/dL     HDL,DIRECT 77 mg/dL H 40-60   Specimen collection should occur prior to Metamizole administration due to the potential for falsely depressed results  LDL CHOLESTEROL CALCULATED 56 mg/dL  0-100   - Patient Instructions: This is a fasting blood test  Water,black tea or black  coffee only after 9:00pm the night before test   Drink 2 glasses of water the morning of test     - Patient Instructions: This is a fasting blood test  Water,black tea or black  coffee only after 9:00pm the night before test Drink 2 glasses of water the morning of test   Triglyceride:         Normal              <150 mg/dl       Borderline High    150-199 mg/dl       High               200-499 mg/dl       Very High          >499 mg/dl  Cholesterol:         Desirable        <200 mg/dl      Borderline High  200-239 mg/dl      High             >239 mg/dl  HDL Cholesterol:        High    >59 mg/dL      Low     <41 mg/dL  LDL CALCULATED:    This screening LDL is a calculated result  It does not have the accuracy of the Direct Measured LDL in the monitoring of patients with hyperlipidemia and/or statin therapy  Direct Measure LDL (JYK182) must be ordered separately in these patients  TRIGLYCERIDES 38 mg/dL  <=150   Specimen collection should occur prior to N-Acetylcysteine or Metamizole administration due to the potential for falsely depressed results

## 2018-01-22 VITALS
OXYGEN SATURATION: 97 % | WEIGHT: 185 LBS | BODY MASS INDEX: 29.03 KG/M2 | HEIGHT: 67 IN | DIASTOLIC BLOOD PRESSURE: 80 MMHG | TEMPERATURE: 99 F | RESPIRATION RATE: 18 BRPM | HEART RATE: 60 BPM | SYSTOLIC BLOOD PRESSURE: 142 MMHG

## 2018-01-22 VITALS
WEIGHT: 186 LBS | BODY MASS INDEX: 29.19 KG/M2 | DIASTOLIC BLOOD PRESSURE: 80 MMHG | SYSTOLIC BLOOD PRESSURE: 138 MMHG | HEIGHT: 67 IN | OXYGEN SATURATION: 98 % | RESPIRATION RATE: 18 BRPM | HEART RATE: 60 BPM

## 2018-01-23 VITALS
SYSTOLIC BLOOD PRESSURE: 156 MMHG | DIASTOLIC BLOOD PRESSURE: 78 MMHG | BODY MASS INDEX: 29.29 KG/M2 | WEIGHT: 187 LBS | HEART RATE: 72 BPM

## 2018-01-24 VITALS — SYSTOLIC BLOOD PRESSURE: 158 MMHG | DIASTOLIC BLOOD PRESSURE: 84 MMHG

## 2018-01-29 DIAGNOSIS — F32.A DEPRESSION, UNSPECIFIED DEPRESSION TYPE: Primary | ICD-10-CM

## 2018-01-29 RX ORDER — CHLORAL HYDRATE 500 MG
CAPSULE ORAL DAILY
COMMUNITY

## 2018-01-29 RX ORDER — MONTELUKAST SODIUM 10 MG/1
1 TABLET ORAL DAILY
COMMUNITY
End: 2018-02-27 | Stop reason: SDUPTHER

## 2018-01-29 RX ORDER — SERTRALINE HYDROCHLORIDE 100 MG/1
50 TABLET, FILM COATED ORAL DAILY
Qty: 90 TABLET | Refills: 3 | Status: SHIPPED | OUTPATIENT
Start: 2018-01-29 | End: 2018-01-30 | Stop reason: SDUPTHER

## 2018-01-29 RX ORDER — FLUTICASONE PROPIONATE 50 MCG
2 SPRAY, SUSPENSION (ML) NASAL DAILY PRN
COMMUNITY
Start: 2017-12-28 | End: 2019-07-15 | Stop reason: SDUPTHER

## 2018-01-30 DIAGNOSIS — F32.A DEPRESSION, UNSPECIFIED DEPRESSION TYPE: ICD-10-CM

## 2018-02-12 ENCOUNTER — LAB (OUTPATIENT)
Dept: LAB | Facility: MEDICAL CENTER | Age: 68
End: 2018-02-12
Payer: MEDICARE

## 2018-02-12 ENCOUNTER — TRANSCRIBE ORDERS (OUTPATIENT)
Dept: ADMINISTRATIVE | Facility: HOSPITAL | Age: 68
End: 2018-02-12

## 2018-02-12 DIAGNOSIS — Z12.5 ENCOUNTER FOR SCREENING FOR MALIGNANT NEOPLASM OF PROSTATE: ICD-10-CM

## 2018-02-12 DIAGNOSIS — I10 ESSENTIAL (PRIMARY) HYPERTENSION: ICD-10-CM

## 2018-02-12 DIAGNOSIS — D50.9 IRON DEFICIENCY ANEMIA: ICD-10-CM

## 2018-02-12 LAB
ALBUMIN SERPL BCP-MCNC: 4.3 G/DL (ref 3.5–5)
ALP SERPL-CCNC: 89 U/L (ref 46–116)
ALT SERPL W P-5'-P-CCNC: 23 U/L (ref 12–78)
ANION GAP SERPL CALCULATED.3IONS-SCNC: 6 MMOL/L (ref 4–13)
AST SERPL W P-5'-P-CCNC: 17 U/L (ref 5–45)
BASOPHILS # BLD AUTO: 0.02 THOUSANDS/ΜL (ref 0–0.1)
BASOPHILS NFR BLD AUTO: 0 % (ref 0–1)
BILIRUB SERPL-MCNC: 0.61 MG/DL (ref 0.2–1)
BUN SERPL-MCNC: 7 MG/DL (ref 5–25)
CALCIUM SERPL-MCNC: 8.7 MG/DL (ref 8.3–10.1)
CHLORIDE SERPL-SCNC: 97 MMOL/L (ref 100–108)
CHOLEST SERPL-MCNC: 137 MG/DL (ref 50–200)
CO2 SERPL-SCNC: 29 MMOL/L (ref 21–32)
CREAT SERPL-MCNC: 0.76 MG/DL (ref 0.6–1.3)
EOSINOPHIL # BLD AUTO: 0.12 THOUSAND/ΜL (ref 0–0.61)
EOSINOPHIL NFR BLD AUTO: 1 % (ref 0–6)
ERYTHROCYTE [DISTWIDTH] IN BLOOD BY AUTOMATED COUNT: 14.5 % (ref 11.6–15.1)
FERRITIN SERPL-MCNC: 27 NG/ML (ref 8–388)
GFR SERPL CREATININE-BSD FRML MDRD: 96 ML/MIN/1.73SQ M
GLUCOSE P FAST SERPL-MCNC: 111 MG/DL (ref 65–99)
HCT VFR BLD AUTO: 35.7 % (ref 36.5–49.3)
HDLC SERPL-MCNC: 58 MG/DL (ref 40–60)
HGB BLD-MCNC: 12.2 G/DL (ref 12–17)
LDLC SERPL CALC-MCNC: 62 MG/DL (ref 0–100)
LYMPHOCYTES # BLD AUTO: 17.61 THOUSANDS/ΜL (ref 0.6–4.47)
LYMPHOCYTES NFR BLD AUTO: 84 % (ref 14–44)
MCH RBC QN AUTO: 30.8 PG (ref 26.8–34.3)
MCHC RBC AUTO-ENTMCNC: 34.2 G/DL (ref 31.4–37.4)
MCV RBC AUTO: 90 FL (ref 82–98)
MONOCYTES # BLD AUTO: 0.36 THOUSAND/ΜL (ref 0.17–1.22)
MONOCYTES NFR BLD AUTO: 2 % (ref 4–12)
NEUTROPHILS # BLD AUTO: 2.69 THOUSANDS/ΜL (ref 1.85–7.62)
NEUTS SEG NFR BLD AUTO: 13 % (ref 43–75)
NRBC BLD AUTO-RTO: 0 /100 WBCS
PLATELET # BLD AUTO: 173 THOUSANDS/UL (ref 149–390)
PMV BLD AUTO: 9.5 FL (ref 8.9–12.7)
POTASSIUM SERPL-SCNC: 4.3 MMOL/L (ref 3.5–5.3)
PROT SERPL-MCNC: 8.3 G/DL (ref 6.4–8.2)
PSA SERPL-MCNC: 0.1 NG/ML (ref 0–4)
RBC # BLD AUTO: 3.96 MILLION/UL (ref 3.88–5.62)
SODIUM SERPL-SCNC: 132 MMOL/L (ref 136–145)
TRIGL SERPL-MCNC: 83 MG/DL
WBC # BLD AUTO: 20.81 THOUSAND/UL (ref 4.31–10.16)

## 2018-02-12 PROCEDURE — 36415 COLL VENOUS BLD VENIPUNCTURE: CPT

## 2018-02-12 PROCEDURE — G0103 PSA SCREENING: HCPCS | Performed by: FAMILY MEDICINE

## 2018-02-12 PROCEDURE — 85025 COMPLETE CBC W/AUTO DIFF WBC: CPT

## 2018-02-12 PROCEDURE — 80053 COMPREHEN METABOLIC PANEL: CPT

## 2018-02-12 PROCEDURE — 80061 LIPID PANEL: CPT | Performed by: FAMILY MEDICINE

## 2018-02-12 PROCEDURE — 82728 ASSAY OF FERRITIN: CPT

## 2018-02-13 ENCOUNTER — OFFICE VISIT (OUTPATIENT)
Dept: PULMONOLOGY | Facility: CLINIC | Age: 68
End: 2018-02-13
Payer: MEDICARE

## 2018-02-13 VITALS
BODY MASS INDEX: 28.79 KG/M2 | HEART RATE: 67 BPM | WEIGHT: 190 LBS | DIASTOLIC BLOOD PRESSURE: 72 MMHG | HEIGHT: 68 IN | TEMPERATURE: 98.8 F | SYSTOLIC BLOOD PRESSURE: 130 MMHG | OXYGEN SATURATION: 98 %

## 2018-02-13 DIAGNOSIS — J30.89 CHRONIC NONSEASONAL ALLERGIC RHINITIS DUE TO POLLEN: ICD-10-CM

## 2018-02-13 DIAGNOSIS — I27.20 PULMONARY HYPERTENSION (HCC): Primary | ICD-10-CM

## 2018-02-13 PROBLEM — M16.11 ARTHRITIS OF RIGHT HIP: Status: ACTIVE | Noted: 2017-09-15

## 2018-02-13 PROBLEM — D50.9 IRON DEFICIENCY ANEMIA: Status: ACTIVE | Noted: 2017-08-24

## 2018-02-13 PROBLEM — M48.061 SPINAL STENOSIS OF LUMBAR REGION: Status: ACTIVE | Noted: 2017-05-15

## 2018-02-13 PROCEDURE — 99213 OFFICE O/P EST LOW 20 MIN: CPT | Performed by: INTERNAL MEDICINE

## 2018-02-13 NOTE — PATIENT INSTRUCTIONS
· We will repeat your echocardiogram in September and see you in follow-up for the pulmonary hypertension after that  · The pulmonary hypertension is very mild by the last echocardiogram   I would encourage you to continue exercising and increasing your physical activity    · No specific treatment is indicated for your pulmonary hypertension currently

## 2018-02-13 NOTE — PROGRESS NOTES
Progress note - Pulmonary Medicine   Royal House 74 y o  male MRN: [id number]        Pulmonary hypertension  · Based on standard echocardiogram this is probably quite mild  · He does not report significant symptomatology  · Recommended 1 year repeat echocardiogram to assess pulmonary pressures  · No current therapy indicated but increased activity for cardiovascular conditioning was encouraged    Allergic rhinitis due to pollen  · Has a morning cough with some phlegm related to allergies and postnasal drip  · Takes Zyrtec and Singulair which seemed to help control symptoms  He is not particularly bothered by symptoms throughout the day however  · He will continue present prescribed therapy     Doing well  He will return to see me after echocardiogram in September  ______________________________________________________________________    HPI:    Royal House presents today for follow-up of mild pulmonary hypertension and cough with seasonal allergies  He is doing well  He had hip replacement surgery with Dr Anila Dee and has done very well postoperatively  He is walking much better  He does not have any significant hip or back pain any longer  He has been able to increase his activity a bit as well  His pulmonary symptoms are minimal   He does not report shortness of breath, chest pain, wheezing or other symptoms  He does have a bit of a cough first thing in the morning which is related to allergies and postnasal drip  There is some seasonal association but he does have symptoms throughout the year  He takes Singulair and Zyrtec to treat the symptoms with reasonably good effect  He does not report significant esophageal reflux or other abdominal complaints  He has improved arthritis symptoms after hip replacement surgery  Review of Systems:  Review of Systems   Constitutional: Negative for chills, fatigue and unexpected weight change     HENT:        As per HPI   Respiratory: As per HPI   Cardiovascular:        As per HPI   Gastrointestinal: Negative  Genitourinary: Negative  Musculoskeletal:        As per HPI   Neurological: Negative  Hematological:        He has iron deficiency anemia and will be following up with Dr Meryl Singh   All other systems reviewed and are negative  Social history updates:  History   Smoking Status    Former Smoker    Packs/day: 1 00    Years: 15 00    Types: Cigarettes    Quit date: 9/5/1980   Smokeless Tobacco    Never Used         PhysicalExamination:  Vitals:   /72 (BP Location: Left arm, Patient Position: Sitting)   Pulse 67   Temp 98 8 °F (37 1 °C) (Tympanic)   Ht 5' 8" (1 727 m)   Wt 86 2 kg (190 lb)   SpO2 98%   BMI 28 89 kg/m²     Gen:  Comfortable on room air and without respiratory distress  HEENT: PERRL  Oropharynx is clear without any erythema or exudate  Neck: Supple  There is no JVD, lymphadenopathy or thyromegaly appreciated  Trachea is midline  Chest: Symmetric chest wall excursion  Lung fields are clear to auscultation  No wheezes, rales or rhonchi  Normal resonance to percussion  Cardiac: Regular rate and rhythm  There are no murmurs  Abdomen: Soft and nontender  Benign  Extremities: No clubbing, cyanosis or edema  Neurologic: No focal deficits  Skin: No appreciable rashes  Diagnostic Data:  Labs:   I personally reviewed the most recent laboratory data pertinent to today's visit  Appointment on 02/12/2018   Component Date Value    WBC 02/12/2018 20 81*    RBC 02/12/2018 3 96     Hemoglobin 02/12/2018 12 2     Hematocrit 02/12/2018 35 7*    MCV 02/12/2018 90     MCH 02/12/2018 30 8     MCHC 02/12/2018 34 2     RDW 02/12/2018 14 5     MPV 02/12/2018 9 5     Platelets 65/21/0733 173     nRBC 02/12/2018 0     Neutrophils Relative 02/12/2018 13*    Lymphocytes Relative 02/12/2018 84*    Monocytes Relative 02/12/2018 2*    Eosinophils Relative 02/12/2018 1     Basophils Relative 02/12/2018 0     Neutrophils Absolute 02/12/2018 2 69     Lymphocytes Absolute 02/12/2018 17 61*    Monocytes Absolute 02/12/2018 0 36     Eosinophils Absolute 02/12/2018 0 12     Basophils Absolute 02/12/2018 0 02     Sodium 02/12/2018 132*    Potassium 02/12/2018 4 3     Chloride 02/12/2018 97*    CO2 02/12/2018 29     Anion Gap 02/12/2018 6     BUN 02/12/2018 7     Creatinine 02/12/2018 0 76     Glucose, Fasting 02/12/2018 111*    Calcium 02/12/2018 8 7     AST 02/12/2018 17     ALT 02/12/2018 23     Alkaline Phosphatase 02/12/2018 89     Total Protein 02/12/2018 8 3*    Albumin 02/12/2018 4 3     Total Bilirubin 02/12/2018 0 61     eGFR 02/12/2018 96     Ferritin 02/12/2018 27    Transcribe Orders on 02/12/2018   Component Date Value    PSA 02/12/2018 0 1     Cholesterol 02/12/2018 137     Triglycerides 02/12/2018 83     HDL, Direct 02/12/2018 58     LDL Calculated 02/12/2018 62        Anemia improved  Last echocardiogram and chest x-ray imaging from September    Echocardiogram will be repeated in September      Johnny Scott MD

## 2018-02-13 NOTE — ASSESSMENT & PLAN NOTE
· Has a morning cough with some phlegm related to allergies and postnasal drip  · Takes Zyrtec and Singulair which seemed to help control symptoms  He is not particularly bothered by symptoms throughout the day however    · He will continue present prescribed therapy

## 2018-02-13 NOTE — ASSESSMENT & PLAN NOTE
· Based on standard echocardiogram this is probably quite mild  · He does not report significant symptomatology  · Recommended 1 year repeat echocardiogram to assess pulmonary pressures      · No current therapy indicated but increased activity for cardiovascular conditioning was encouraged

## 2018-02-19 DIAGNOSIS — D50.9 IRON DEFICIENCY ANEMIA: ICD-10-CM

## 2018-02-22 ENCOUNTER — OFFICE VISIT (OUTPATIENT)
Dept: HEMATOLOGY ONCOLOGY | Facility: CLINIC | Age: 68
End: 2018-02-22
Payer: MEDICARE

## 2018-02-22 VITALS
RESPIRATION RATE: 18 BRPM | SYSTOLIC BLOOD PRESSURE: 134 MMHG | WEIGHT: 196 LBS | HEIGHT: 68 IN | HEART RATE: 66 BPM | OXYGEN SATURATION: 94 % | BODY MASS INDEX: 29.7 KG/M2 | DIASTOLIC BLOOD PRESSURE: 70 MMHG | TEMPERATURE: 98.7 F

## 2018-02-22 DIAGNOSIS — C91.10 CLL (CHRONIC LYMPHOCYTIC LEUKEMIA) (HCC): Primary | Chronic | ICD-10-CM

## 2018-02-22 PROCEDURE — 99214 OFFICE O/P EST MOD 30 MIN: CPT | Performed by: INTERNAL MEDICINE

## 2018-02-22 NOTE — PROGRESS NOTES
Hematology / Oncology Outpatient Follow Up Note    Gisela Patiño 74 y o  male [facility] [place]:[id number] TWS:[id number]         Date:  2/22/2018    Assessment / Plan:  A 74year old gentleman with chronic lymphocytic leukemia diagnosed in March 2010  He has lymphocytosis with no lymphadenopathy  He is currently on watchful waiting  He previously had iron deficiency anemia which was corrected by IV iron  Most recent CBC showed no evidence anemia  Based on laboratory as well as his symptomatology, he has no progression  Therefore, I recommended him to continue with watchful waiting  I will see him again in 6 months with CBC, CMP, ferritin and LDH  He is in agreement with my recommendation  Subjective:     HPI:          Interval History:  A 74year old gentleman with chronic lymphocytic leukemia diagnosed in March 2010  He has been on watchful waiting because he has absolutely no symptoms from hematology standpoint  Over the last few years, he had slowly progressive microcytic anemia  He was found to have iron deficiency  He is up to date for colonoscopy  Because of his intolerance to oral iron, he was given IV iron with resolution of anemia  He came in today for follow-up  He has no fever, chills or night sweats  He denied any pain  He has 10 lb of interval weight gain  He has no respiratory symptoms  He has not noticed any lymphadenopathy  His performance status is normal       Objective:     Primary Diagnosis:    Chronic lymphocytic leukemia  Diagnosed in March 2010  CD-5, CD-23 positive  Kappa light chain positive  CD-38 negative  ZAP-70 positive disease  Diagnosed in 2010    Cancer Staging:  No matching staging information was found for the patient  Previous Hematologic/ Oncologic Treatment:         Current Hematologic/ Oncologic Treatment:      Watchful waiting  Disease Status:     No evidence of progressive disease      Test Results:    Pathology:        Radiology:        Laboratory:    See below    Physical Exam:      General Appearance:    Alert, oriented        Eyes:    PERRL   Ears:    Normal external ear canals, both ears   Nose:   Nares normal, septum midline   Throat:   Mucosa moist  Pharynx without injection  Neck:   Supple       Lungs:     Clear to auscultation bilaterally   Chest Wall:    No tenderness or deformity    Heart:    Regular rate and rhythm       Abdomen:     Soft, non-tender, bowel sounds +, no organomegaly           Extremities:   Extremities no cyanosis or edema       Skin:   no rash or icterus  Lymph nodes:   Cervical, supraclavicular, and axillary nodes normal   Neurologic:   CNII-XII intact, normal strength, sensation and reflexes     Throughout                 ROS: Review of Systems   All other systems reviewed and are negative  Imaging: No results found  Labs:   Lab Results   Component Value Date    WBC 20 81 (H) 02/12/2018    HGB 12 2 02/12/2018    HCT 35 7 (L) 02/12/2018    MCV 90 02/12/2018     02/12/2018     Lab Results   Component Value Date     (L) 02/12/2018    K 4 3 02/12/2018    CL 97 (L) 02/12/2018    CO2 29 02/12/2018    ANIONGAP 6 02/12/2018    BUN 7 02/12/2018    CREATININE 0 76 02/12/2018    GLUCOSE 118 09/30/2017    GLUF 111 (H) 02/12/2018    CALCIUM 8 7 02/12/2018    AST 17 02/12/2018    ALT 23 02/12/2018    ALKPHOS 89 02/12/2018    PROT 8 3 (H) 02/12/2018    BILITOT 0 61 02/12/2018    EGFR 96 02/12/2018       Lab Results   Component Value Date     02/23/2017         Lab Results   Component Value Date    PSA 0 1 02/12/2018    PSA 0 2 02/15/2017    PSA 0 1 02/03/2016         Lab Results   Component Value Date    FERRITIN 27 02/12/2018       No results found for: [id number]    Lab Results   Component Value Date    FOLATE 11 2 02/23/2017         Current Medications: Reviewed  Allergies: Reviewed  PMH/FH/SH:  Reviewed      Vital Sign:    Body surface area is 2 03 meters squared      Wt Readings from Last 3 Encounters: 02/22/18 88 9 kg (196 lb)   02/13/18 86 2 kg (190 lb)   12/28/17 83 9 kg (185 lb)        Temp Readings from Last 3 Encounters:   02/22/18 98 7 °F (37 1 °C) (Tympanic)   02/13/18 98 8 °F (37 1 °C) (Tympanic)   12/28/17 99 °F (37 2 °C)        BP Readings from Last 3 Encounters:   02/22/18 134/70   02/13/18 130/72   12/28/17 142/80         Pulse Readings from Last 3 Encounters:   02/22/18 66   02/13/18 67   12/28/17 60     @LASTSAO2(3)@

## 2018-02-23 RX ORDER — ZOLPIDEM TARTRATE 10 MG/1
TABLET ORAL
COMMUNITY
Start: 2018-01-04 | End: 2018-05-21 | Stop reason: SDUPTHER

## 2018-02-26 ENCOUNTER — CONSULT (OUTPATIENT)
Dept: FAMILY MEDICINE CLINIC | Facility: CLINIC | Age: 68
End: 2018-02-26
Payer: MEDICARE

## 2018-02-26 VITALS
BODY MASS INDEX: 28.79 KG/M2 | TEMPERATURE: 98.2 F | DIASTOLIC BLOOD PRESSURE: 72 MMHG | OXYGEN SATURATION: 98 % | HEART RATE: 64 BPM | RESPIRATION RATE: 18 BRPM | HEIGHT: 68 IN | SYSTOLIC BLOOD PRESSURE: 128 MMHG | WEIGHT: 190 LBS

## 2018-02-26 DIAGNOSIS — H25.11 AGE-RELATED NUCLEAR CATARACT OF RIGHT EYE: ICD-10-CM

## 2018-02-26 DIAGNOSIS — Z01.818 PREOP EXAMINATION: Primary | ICD-10-CM

## 2018-02-26 DIAGNOSIS — I27.20 PULMONARY HYPERTENSION (HCC): ICD-10-CM

## 2018-02-26 DIAGNOSIS — R94.31 LONG QT INTERVAL: ICD-10-CM

## 2018-02-26 DIAGNOSIS — C91.10 CLL (CHRONIC LYMPHOCYTIC LEUKEMIA) (HCC): Chronic | ICD-10-CM

## 2018-02-26 PROCEDURE — 99213 OFFICE O/P EST LOW 20 MIN: CPT | Performed by: FAMILY MEDICINE

## 2018-02-26 NOTE — ASSESSMENT & PLAN NOTE
Decreased Zoloft to improve, patient asx and not taking any other QT prolonging medications  May be pertinent for surgeon to be aware of

## 2018-02-26 NOTE — PROGRESS NOTES
Family Medicine Preoperative ClearanceOffice Visit  Royal House 74 y o  male   MRN: [id number] : 1950  ENCOUNTER: 2018 10:24 AM    Assessment and Plan   Age-related nuclear cataract of right eye  Surgical clearance: The patient is found to be at low risk from a cardiovascular standpoint  Additional cardiac testing is NOT  necessary  RCRI CRITERIA: 0  Preoperative testing reviewed includes CMP, CBC, Most recent echo  Re-evaluation is needed if the patient should present with symptoms prior to surgery/ procedure  Surgical clearance will be faxed to Dr Susan Bonner  Long QT interval  Decreased Zoloft to improve, patient asx and not taking any other QT prolonging medications  May be pertinent for surgeon to be aware of  Preop examination  See clearance noted below    Pulmonary hypertension  Following with Dr Marisol Austin, plan to be seen again with Echo this fall    CLL (chronic lymphocytic leukemia) ([facility] [place] [address] )  Continue to follow with Dr Veto Samuel, recently seen      Chief Complaint     Chief Complaint   Patient presents with    Pre-op Exam     left cataract on 18 with Dr Susan Bonner        History of Present Illness   Royal House is a 74y o -year-old male who presents today for preoperative clearance  He complains of worsening vision and is planning to have cataract surgery in the next few days with Dr Susan Bonner  Otherwise he is doing well and he reports he feels stable    Pre-Op Visit:  The patient is being seen today for preoperative visit  The procedure that is scheduled is R cataract surgery  It is scheduled on 18  with Dr Susan Bonner  The indication for the surgery is difficulty seeing especially in the evening  Surgical Risk Assessment:  Prior Anesthesia:  The patient has  had prior anesthesia without any problems    Pertinent Past Medical History:  The patient Does not have history of diabetes, angina, CAD, arrhythmia, DVT, pulmonary embolism, chronic liver disease, chronic kidney disease, COPD, asthma, thyroid disease, seizure disorder  Exercise Capacity:  The patient can  walk 4 blocks without symptoms and can  walk two flights of stairs without symptoms  Lifestyle Factors: The patient does admit to alcohol use--not every day but drinks beer at the bar, denies tobacco use, and denies drug use  Symptoms:  The patient denies easy bruising, chest pain, cough, dyspnea, edema, palpitations, and wheezing  Complains of post nasal drip  Pertinent Family History:  The patient DOES family history of ischemic heart diease (Father had an MI at 61), and stroke (Grandfather) but denies adverse reaction to anesthesia, anuerysm, bleeding problems, sudden early death  Living Situation:  The patient's reports home is secure and denies any post-op concerns with current living situation  Review of Systems   Review of Systems   Constitutional: Negative for chills, fatigue, fever and unexpected weight change  HENT: Positive for postnasal drip  Negative for hearing loss and sore throat  Eyes: Positive for visual disturbance  Negative for discharge  Respiratory: Negative for shortness of breath  Cardiovascular: Negative for chest pain  Gastrointestinal: Negative for constipation, diarrhea, nausea and vomiting  Genitourinary: Negative for dysuria  No incontinence   Musculoskeletal: Negative for arthralgias and myalgias  Skin: Negative for rash  Neurological: Negative for headaches  Psychiatric/Behavioral:        No anxiety or depression   All other systems reviewed and are negative        Active Problem List     Patient Active Problem List   Diagnosis    Hypertension    [facility]) ([facility] [address] )    Primary osteoarthritis of right hip    Allergic rhinitis due to pollen    Erectile dysfunction of non-organic origin    Esophageal reflux    Pulmonary hypertension    Spinal stenosis of lumbar region    Enlarged prostate without lower urinary tract symptoms (luts)  Iron deficiency anemia    Insomnia    Arthritis of right hip    Long QT interval    Preop examination    Age-related nuclear cataract of right eye       Past Medical History, Past Surgical History, Family History, and Social History were reviewed and updated today as appropriate  Objective   /72 (BP Location: Left arm, Patient Position: Sitting)   Pulse 64   Temp 98 2 °F (36 8 °C)   Resp 18   Ht 5' 8" (1 727 m)   Wt 86 2 kg (190 lb)   SpO2 98%   BMI 28 89 kg/m²     Physical Exam   Constitutional: He is oriented to person, place, and time  Vital signs are normal  He appears well-developed and well-nourished  Non-toxic appearance  No distress  Appears Stated Age   HENT:   Head: Normocephalic and atraumatic  Nose: Nose normal    Mouth/Throat: Oropharynx is clear and moist  No oropharyngeal exudate  Eyes: Conjunctivae and EOM are normal  Pupils are equal, round, and reactive to light  Right eye exhibits no discharge  Left eye exhibits no discharge  Left conjunctiva is not injected  No scleral icterus  Right eye exhibits no nystagmus  Left eye exhibits no nystagmus  Accomodation intact   Neck: Normal range of motion  Neck supple  Carotid bruit is not present  No thyromegaly present  Cardiovascular: Normal rate, regular rhythm, S1 normal and S2 normal   Exam reveals no gallop and no friction rub  No murmur heard  Pulmonary/Chest: Effort normal and breath sounds normal  No respiratory distress  He has no wheezes  He has no rhonchi  He has no rales  Abdominal: Soft  Bowel sounds are normal  He exhibits no distension  There is no tenderness  There is no rebound and no guarding  Musculoskeletal: He exhibits no edema  No clubbing or cyanosis   Lymphadenopathy:     He has no cervical adenopathy  Neurological: He is alert and oriented to person, place, and time  No focal neurologic deficits noted on exam, no change from baseline status   Skin: Skin is warm and dry  No rash noted  He is not diaphoretic  Psychiatric: He has a normal mood and affect  His behavior is normal  Thought content normal    stable   Vitals reviewed      Diabetic Foot Exam    Pertinent Laboratory/Diagnostic Studies:  Lab Results   Component Value Date    GLUCOSE 118 09/30/2017    BUN 7 02/12/2018    CREATININE 0 76 02/12/2018    CALCIUM 8 7 02/12/2018     (L) 02/12/2018    K 4 3 02/12/2018    CO2 29 02/12/2018    CL 97 (L) 02/12/2018     Lab Results   Component Value Date    ALT 23 02/12/2018    AST 17 02/12/2018    ALKPHOS 89 02/12/2018    BILITOT 0 61 02/12/2018       Lab Results   Component Value Date    WBC 20 81 (H) 02/12/2018    HGB 12 2 02/12/2018    HCT 35 7 (L) 02/12/2018    MCV 90 02/12/2018     02/12/2018       No results found for: TSH    Lab Results   Component Value Date    CHOL 137 02/12/2018     Lab Results   Component Value Date    TRIG 83 02/12/2018     Lab Results   Component Value Date    HDL 58 02/12/2018     Lab Results   Component Value Date    LDLCALC 62 02/12/2018     No results found for: HGBA1C    Results for orders placed or performed in visit on 02/12/18   CBC and differential   Result Value Ref Range    WBC 20 81 (H) 4 31 - 10 16 Thousand/uL    RBC 3 96 3 88 - 5 62 Million/uL    Hemoglobin 12 2 12 0 - 17 0 g/dL    Hematocrit 35 7 (L) 36 5 - 49 3 %    MCV 90 82 - 98 fL    MCH 30 8 26 8 - 34 3 pg    MCHC 34 2 31 4 - 37 4 g/dL    RDW 14 5 11 6 - 15 1 %    MPV 9 5 8 9 - 12 7 fL    Platelets 922 610 - 245 Thousands/uL    nRBC 0 /100 WBCs    Neutrophils Relative 13 (L) 43 - 75 %    Lymphocytes Relative 84 (H) 14 - 44 %    Monocytes Relative 2 (L) 4 - 12 %    Eosinophils Relative 1 0 - 6 %    Basophils Relative 0 0 - 1 %    Neutrophils Absolute 2 69 1 85 - 7 62 Thousands/µL    Lymphocytes Absolute 17 61 (H) 0 60 - 4 47 Thousands/µL    Monocytes Absolute 0 36 0 17 - 1 22 Thousand/µL    Eosinophils Absolute 0 12 0 00 - 0 61 Thousand/µL    Basophils Absolute 0 02 0 00 - 0 10 Thousands/µL   Comprehensive metabolic panel   Result Value Ref Range    Sodium 132 (L) 136 - 145 mmol/L    Potassium 4 3 3 5 - 5 3 mmol/L    Chloride 97 (L) 100 - 108 mmol/L    CO2 29 21 - 32 mmol/L    Anion Gap 6 4 - 13 mmol/L    BUN 7 5 - 25 mg/dL    Creatinine 0 76 0 60 - 1 30 mg/dL    Glucose, Fasting 111 (H) 65 - 99 mg/dL    Calcium 8 7 8 3 - 10 1 mg/dL    AST 17 5 - 45 U/L    ALT 23 12 - 78 U/L    Alkaline Phosphatase 89 46 - 116 U/L    Total Protein 8 3 (H) 6 4 - 8 2 g/dL    Albumin 4 3 3 5 - 5 0 g/dL    Total Bilirubin 0 61 0 20 - 1 00 mg/dL    eGFR 96 ml/min/1 73sq m   Ferritin   Result Value Ref Range    Ferritin 27 8 - 388 ng/mL       No orders of the defined types were placed in this encounter          Current Medications     Current Outpatient Prescriptions   Medication Sig Dispense Refill    amLODIPine (NORVASC) 5 mg tablet Take 5 mg by mouth every evening        aspirin 81 MG tablet Take 1 tablet by mouth daily      carvedilol (COREG) 12 5 mg tablet Take 12 5 mg by mouth 2 (two) times a day with meals      fluticasone (FLONASE) 50 mcg/act nasal spray 2 sprays into each nostril daily      lisinopril (ZESTRIL) 40 mg tablet Take 40 mg by mouth daily      Milk Thistle 1000 MG CAPS Take by mouth      minoxidil (LONITEN) 10 mg tablet Take 10 mg by mouth daily      montelukast (SINGULAIR) 10 mg tablet Take 1 tablet by mouth daily      Omega-3 Fatty Acids (FISH OIL) 1,000 mg Take by mouth daily      omeprazole (PriLOSEC) 40 MG capsule Take 40 mg by mouth daily      senna (SENOKOT) 8 6 mg Take 1 tablet by mouth daily 120 each 0    sertraline (ZOLOFT) 50 mg tablet Take 1 tablet (50 mg total) by mouth daily 90 tablet 0    simvastatin (ZOCOR) 20 mg tablet Take 20 mg by mouth daily at bedtime      tamsulosin (FLOMAX) 0 4 mg Take 0 4 mg by mouth daily with dinner      zolpidem (AMBIEN) 10 mg tablet       zolpidem (AMBIEN) 5 mg tablet Take 5 mg by mouth daily at bedtime as needed for sleep (10mg) No current facility-administered medications for this visit  ALLERGIES:  Allergies   Allergen Reactions    Codeine Other (See Comments)     agitated    Bactrim [Sulfamethoxazole-Trimethoprim] GI Intolerance       Health Maintenance     Health Maintenance   Topic Date Due    HIV SCREENING  1950    Hepatitis C Screening  1950    COLONOSCOPY  1950    Depression Screening PHQ-9  09/18/1964    DTaP,Tdap,and Td Vaccines (2 - Td) 09/19/2016    Fall Risk  09/18/2017    ABDOMINAL AORTIC ANEURYSM (AAA) SCREEN  09/18/2017    INFLUENZA VACCINE  Completed    PNEUMOCOCCAL POLYSACCHARIDE VACCINE AGE 72 AND OVER  Completed     Immunization History   Administered Date(s) Administered    Influenza 09/21/2015, 10/14/2016    Influenza Quadrivalent Preservative Free 3 years and older IM 10/14/2016, 09/15/2017    Influenza Quadrivalent, 6-35 Months IM 09/21/2015    Influenza Split Preservative Free ID 09/10/2013    Influenza TIV (IM) 08/31/2012, 09/23/2014    Pneumococcal Conjugate 13-Valent 10/14/2016    Pneumococcal Polysaccharide PPV23 09/10/2013    Tdap 08/22/2016    Zoster 04/29/2015         Lakhwinder Ibanez MD   [facility]  2/26/2018  10:24 AM    Parts of this note were dictated using M*[employer] dictation software and may have sounds-like errors due to variation in pronunciation

## 2018-02-26 NOTE — ASSESSMENT & PLAN NOTE
Surgical clearance: The patient is found to be at low risk from a cardiovascular standpoint  Additional cardiac testing is NOT  necessary  RCRI CRITERIA: 0  Preoperative testing reviewed includes CMP, CBC, Most recent echo  Re-evaluation is needed if the patient should present with symptoms prior to surgery/ procedure  Surgical clearance will be faxed to Dr Braden Ellsworth

## 2018-02-26 NOTE — PATIENT INSTRUCTIONS
Age-related nuclear cataract of right eye  Surgical clearance: The patient is found to be at low risk from a cardiovascular standpoint  Additional cardiac testing is NOT  necessary  RCRI CRITERIA: 0  Preoperative testing reviewed includes CMP, CBC, Most recent echo  Re-evaluation is needed if the patient should present with symptoms prior to surgery/ procedure  Surgical clearance will be faxed to Dr Sharlett Sacks  Long QT interval  Decreased Zoloft to improve, patient asx and not taking any other QT prolonging medications  May be pertinent for surgeon to be aware of      Preop examination  See clearance noted below    Pulmonary hypertension  Following with Dr Janis Burrell, plan to be seen again with Echo this fall    CLL (chronic lymphocytic leukemia) ([facility] [address] )  Continue to follow with Dr Machado, recently seen

## 2018-02-27 DIAGNOSIS — J30.1 ALLERGIC RHINITIS DUE TO POLLEN, UNSPECIFIED CHRONICITY, UNSPECIFIED SEASONALITY: Primary | ICD-10-CM

## 2018-02-27 RX ORDER — MONTELUKAST SODIUM 10 MG/1
10 TABLET ORAL DAILY
Qty: 90 TABLET | Refills: 1 | Status: SHIPPED | OUTPATIENT
Start: 2018-02-27 | End: 2018-07-31 | Stop reason: SDUPTHER

## 2018-02-27 RX ORDER — MONTELUKAST SODIUM 10 MG/1
TABLET ORAL
Qty: 90 TABLET | Refills: 0 | OUTPATIENT
Start: 2018-02-27

## 2018-03-12 ENCOUNTER — OFFICE VISIT (OUTPATIENT)
Dept: UROLOGY | Facility: CLINIC | Age: 68
End: 2018-03-12
Payer: MEDICARE

## 2018-03-12 ENCOUNTER — DOCUMENTATION (OUTPATIENT)
Dept: FAMILY MEDICINE CLINIC | Facility: CLINIC | Age: 68
End: 2018-03-12

## 2018-03-12 VITALS
WEIGHT: 190 LBS | HEART RATE: 65 BPM | HEIGHT: 68 IN | BODY MASS INDEX: 28.79 KG/M2 | DIASTOLIC BLOOD PRESSURE: 80 MMHG | SYSTOLIC BLOOD PRESSURE: 142 MMHG

## 2018-03-12 DIAGNOSIS — R97.20 ELEVATED PSA: ICD-10-CM

## 2018-03-12 DIAGNOSIS — Z12.5 SCREENING FOR PROSTATE CANCER: Primary | ICD-10-CM

## 2018-03-12 LAB
SL AMB  POCT GLUCOSE, UA: ABNORMAL
SL AMB LEUKOCYTE ESTERASE,UA: ABNORMAL
SL AMB POCT BILIRUBIN,UA: ABNORMAL
SL AMB POCT BLOOD,UA: ABNORMAL
SL AMB POCT CLARITY,UA: ABNORMAL
SL AMB POCT COLOR,UA: YELLOW
SL AMB POCT KETONES,UA: ABNORMAL
SL AMB POCT NITRITE,UA: ABNORMAL
SL AMB POCT PH,UA: 5
SL AMB POCT SPECIFIC GRAVITY,UA: 1.01
SL AMB POCT URINE PROTEIN: ABNORMAL
SL AMB POCT UROBILINOGEN: ABNORMAL

## 2018-03-12 PROCEDURE — 99213 OFFICE O/P EST LOW 20 MIN: CPT | Performed by: UROLOGY

## 2018-03-12 PROCEDURE — 81002 URINALYSIS NONAUTO W/O SCOPE: CPT | Performed by: UROLOGY

## 2018-03-12 NOTE — PROGRESS NOTES
3/12/2018    Len Ill  1950  [id number]        Assessment  Routine prostate cancer screening      Discussion  I provided Robert Galdamez with reassurance her that his most recent PSA from February 2018 is stable at 0 1  This is unchanged over the course of the last 4 years  There is no family history of prostate cancer and his overall risk of prostate cancer is low based on his PSA and normal digital rectal examination  His next follow-up with Urology can be in 2 years with a repeat PSA and digital rectal examination at that time in the spring of 2020  History of Present Illness  74 y o  male with a history of CLL  He is status post right hip replacement  He also has a history of TURP x2 performed at [facility] as well as [place] [address]  He states he is voiding well with a strong urinary stream and feels that he empties to completion  He returns for routine follow-up today  He was last seen in June 2015  He has no complaints  He denies any lower urinary tract symptoms  AUA Symptom Score      Review of Systems  Review of Systems   Constitutional: Negative  HENT: Negative  Eyes: Negative  Respiratory: Negative  Cardiovascular: Negative  Gastrointestinal: Negative  Endocrine: Negative  Genitourinary: Negative  Musculoskeletal: Negative  Skin: Negative  Allergic/Immunologic: Negative  Neurological: Negative  Hematological: Negative  Psychiatric/Behavioral: Negative  All other systems reviewed and are negative          Past Medical History  Past Medical History:   Diagnosis Date    Anxiety     BPH (benign prostatic hypertrophy)     [facility]     [facility]    Concussion     Resolved: 08/22/16    Depression     Gastrointestinal hemorrhage     Last assessed: 08/27/13    GERD (gastroesophageal reflux disease)     Hearing loss of aging     Hiatal hernia     Hyperlipidemia     Hypertension     Iron deficiency anemia     Microscopic hematuria     Last assessed: 06/28/13    OA (osteoarthritis)     right hip    Pneumothorax     Prostatitis     Pulmonary hypertension     Seasonal allergies     UTI (urinary tract infection)     in past    Wears glasses        Past Social History  Past Surgical History:   Procedure Laterality Date    COLONOSCOPY      CYSTOSCOPY  10/09/2014    Diagnostic    FRACTURE SURGERY      left lower arm    FRACTURE SURGERY      left femur    HERNIA REPAIR      OTHER SURGICAL HISTORY  03/2011    Spinal anesthesia epidural    IA TOTAL HIP ARTHROPLASTY Right 9/29/2017    Procedure: ARTHROPLASTY HIP TOTAL ANTERIOR;  Surgeon: Brianda Ramírez MD;  Location: [facility];  Service: Orthopedics    TONSILLECTOMY AND ADENOIDECTOMY      TRANSURETHRAL RESECTION OF PROSTATE      x 2    WRIST SURGERY         Past Family History  Family History   Problem Relation Age of Onset    No Known Problems Mother     Heart attack Father     Diabetes Brother     Prostate cancer Brother        Past Social history  Social History     Social History    Marital status: /Civil Union     Spouse name: N/A    Number of children: N/A    Years of education: N/A     Occupational History    Not on file       Social History Main Topics    Smoking status: Former Smoker     Packs/day: 1 00     Years: 15 00     Types: Cigarettes     Quit date: 9/5/1980    Smokeless tobacco: Never Used    Alcohol use Yes      Comment: 4 beers day    Drug use: Yes     Types: Marijuana      Comment: few x month    Sexual activity: Not on file     Other Topics Concern    Not on file     Social History Narrative    No narrative on file       Current Medications  Current Outpatient Prescriptions   Medication Sig Dispense Refill    amLODIPine (NORVASC) 5 mg tablet Take 5 mg by mouth every evening        aspirin 81 MG tablet Take 1 tablet by mouth daily      carvedilol (COREG) 12 5 mg tablet Take 12 5 mg by mouth 2 (two) times a day with meals  fluticasone (FLONASE) 50 mcg/act nasal spray 2 sprays into each nostril daily      lisinopril (ZESTRIL) 40 mg tablet Take 40 mg by mouth daily      Milk Thistle 1000 MG CAPS Take by mouth      minoxidil (LONITEN) 10 mg tablet Take 10 mg by mouth daily      montelukast (SINGULAIR) 10 mg tablet Take 1 tablet (10 mg total) by mouth daily for 180 days 90 tablet 1    Omega-3 Fatty Acids (FISH OIL) 1,000 mg Take by mouth daily      omeprazole (PriLOSEC) 40 MG capsule Take 40 mg by mouth daily      senna (SENOKOT) 8 6 mg Take 1 tablet by mouth daily 120 each 0    sertraline (ZOLOFT) 50 mg tablet Take 1 tablet (50 mg total) by mouth daily 90 tablet 0    simvastatin (ZOCOR) 20 mg tablet Take 20 mg by mouth daily at bedtime      tamsulosin (FLOMAX) 0 4 mg Take 0 4 mg by mouth daily with dinner      zolpidem (AMBIEN) 10 mg tablet       zolpidem (AMBIEN) 5 mg tablet Take 5 mg by mouth daily at bedtime as needed for sleep (10mg)       No current facility-administered medications for this visit  Allergies  Allergies   Allergen Reactions    Codeine Other (See Comments)     agitated    Bactrim [Sulfamethoxazole-Trimethoprim] GI Intolerance       Past Medical History, Social History, Family History, medications and allergies were reviewed  Vitals  Vitals:    03/12/18 0924   BP: 142/80   Pulse: 65   Weight: 86 2 kg (190 lb)   Height: 5' 8" (1 727 m)       Physical Exam  Physical Exam   On examination he is in no acute distress  His abdomen is soft nontender nondistended  Hernia  examination reveals normal phallus, scrotum and scrotal contents  A slight bulge in the left groin is appreciated but no justo hernia  Digital rectal examination reveals a very small prostate  Without nodularity  Skin is warm  Extremities without edema    Neurologic is grossly intact and nonfocal   Gait normal   Affect normal        Results  Lab Results   Component Value Date    PSA 0 1 02/12/2018    PSA 0 2 02/15/2017 PSA 0 1 02/03/2016     Lab Results   Component Value Date    GLUCOSE 118 09/30/2017    CALCIUM 8 7 02/12/2018     (L) 02/12/2018    K 4 3 02/12/2018    CO2 29 02/12/2018    CL 97 (L) 02/12/2018    BUN 7 02/12/2018    CREATININE 0 76 02/12/2018     Lab Results   Component Value Date    WBC 20 81 (H) 02/12/2018    HGB 12 2 02/12/2018    HCT 35 7 (L) 02/12/2018    MCV 90 02/12/2018     02/12/2018         Office Urine Dip  Recent Results (from the past 1 hour(s))   POCT urine dip    Collection Time: 03/12/18  9:31 AM   Result Value Ref Range    LEUKOCYTE ESTERASE,UA -      NITRITE,UA -     SL AMB POCT UROBILINOGEN -     SL AMB POCT URINE PROTEIN -      PH,UA 5 0      BLOOD,UA trace      SPECIFIC GRAVITY,UA 1 010      KETONES,UA -      BILIRUBIN,UA -     GLUCOSE, UA -      COLOR,UA yellow      CLARITY,UA trans    ]

## 2018-04-06 ENCOUNTER — OFFICE VISIT (OUTPATIENT)
Dept: FAMILY MEDICINE CLINIC | Facility: CLINIC | Age: 68
End: 2018-04-06
Payer: MEDICARE

## 2018-04-06 VITALS
RESPIRATION RATE: 18 BRPM | BODY MASS INDEX: 29.1 KG/M2 | HEART RATE: 76 BPM | SYSTOLIC BLOOD PRESSURE: 138 MMHG | HEIGHT: 68 IN | OXYGEN SATURATION: 93 % | WEIGHT: 192 LBS | DIASTOLIC BLOOD PRESSURE: 88 MMHG

## 2018-04-06 DIAGNOSIS — J30.89 CHRONIC NONSEASONAL ALLERGIC RHINITIS DUE TO POLLEN: ICD-10-CM

## 2018-04-06 DIAGNOSIS — I10 ESSENTIAL HYPERTENSION: Primary | ICD-10-CM

## 2018-04-06 DIAGNOSIS — I27.20 PULMONARY HYPERTENSION (HCC): ICD-10-CM

## 2018-04-06 DIAGNOSIS — K21.9 GASTROESOPHAGEAL REFLUX DISEASE WITHOUT ESOPHAGITIS: ICD-10-CM

## 2018-04-06 DIAGNOSIS — C91.10 CLL (CHRONIC LYMPHOCYTIC LEUKEMIA) (HCC): Chronic | ICD-10-CM

## 2018-04-06 DIAGNOSIS — N40.0 ENLARGED PROSTATE WITHOUT LOWER URINARY TRACT SYMPTOMS (LUTS): ICD-10-CM

## 2018-04-06 PROBLEM — H25.11 AGE-RELATED NUCLEAR CATARACT OF RIGHT EYE: Status: RESOLVED | Noted: 2018-02-26 | Resolved: 2018-04-06

## 2018-04-06 PROCEDURE — 99214 OFFICE O/P EST MOD 30 MIN: CPT | Performed by: FAMILY MEDICINE

## 2018-04-06 RX ORDER — LOSARTAN POTASSIUM 100 MG/1
100 TABLET ORAL DAILY
Qty: 90 TABLET | Refills: 3 | Status: SHIPPED | OUTPATIENT
Start: 2018-04-06 | End: 2019-03-15 | Stop reason: SDUPTHER

## 2018-04-06 RX ORDER — CARVEDILOL 12.5 MG/1
12.5 TABLET ORAL 2 TIMES DAILY WITH MEALS
Qty: 180 TABLET | Refills: 3 | Status: SHIPPED | OUTPATIENT
Start: 2018-04-06 | End: 2019-03-15 | Stop reason: SDUPTHER

## 2018-04-06 RX ORDER — IPRATROPIUM BROMIDE 42 UG/1
2 SPRAY, METERED NASAL 4 TIMES DAILY
Qty: 45 ML | Refills: 3 | Status: SHIPPED | OUTPATIENT
Start: 2018-04-06 | End: 2019-04-10 | Stop reason: ALTCHOICE

## 2018-04-06 NOTE — ASSESSMENT & PLAN NOTE
Blood pressure is well controlled  We reviewed his blood pressures from home which are all excellent  Continue with his current regimen except I switched lisinopril to losartan with his ongoing cough  I believe his cough is likely from allergies but in ACE-inhibitor can also contribute

## 2018-04-06 NOTE — PROGRESS NOTES
Assessment/Plan:    Essential hypertension  Blood pressure is well controlled  We reviewed his blood pressures from home which are all excellent  Continue with his current regimen except I switched lisinopril to losartan with his ongoing cough  I believe his cough is likely from allergies but in ACE-inhibitor can also contribute  CLL (chronic lymphocytic leukemia) ([address] )  He is stable  Continue with routine Oncology follow-up  Pulmonary hypertension  Pulmonary hypertension remains stable  Repeat echocardiogram prior to his pulmonary follow-up in September  Allergic rhinitis due to pollen  His allergies are persistent  He is having a lot of postnasal drip despite Flonase  I placed him on Atrovent nasal spray 2 sprays per nostril 4 times daily as needed  Enlarged prostate without lower urinary tract symptoms (luts)  Continue with routine Urology follow-up       Diagnoses and all orders for this visit:    Essential hypertension  -     losartan (COZAAR) 100 MG tablet; Take 1 tablet (100 mg total) by mouth daily  -     carvedilol (COREG) 12 5 mg tablet; Take 1 tablet (12 5 mg total) by mouth 2 (two) times a day with meals    CLL (chronic lymphocytic leukemia) (HCC)    Gastroesophageal reflux disease without esophagitis    Pulmonary hypertension  -     Echo complete with contrast if indicated; Future    Chronic nonseasonal allergic rhinitis due to pollen  -     ipratropium (ATROVENT) 0 06 % nasal spray; 2 sprays into each nostril 4 (four) times a day    Enlarged prostate without lower urinary tract symptoms (luts)    Other orders  -      Colonoscopy          Subjective:      Patient ID: Sidney Lane is a 74 y o  male  HPI     The patient presents today for a hypertension follow-up  Patient remains compliant with medications and denies any chest pain, shortness of breath, palpitations, lightheadedness or diaphoresis  We reviewed his labs today which are all excellent    Home blood pressures have been fantastic  Hyperlipidemia is well controlled  He is having no myalgias from his statin therapy  He has history of pulmonary hypertension  He does continue to have some shortness of breath with exertion but denies chest pain  He has history of CLL which is monitored by Oncology  He denies any unintentional weight loss or night sweats  He has persistent cough in the morning  He notes he wakes and cough very severely  He has a lot of postnasal drip  He has been on long-term ACE-inhibitor therapy  He notes his cough is typically in the morning but does occasionally have a dry cough throughout the day  The following portions of the patient's history were reviewed and updated as appropriate: allergies, current medications, past family history, past medical history, past social history, past surgical history and problem list     Review of Systems   Constitutional: Negative for appetite change, chills, fatigue, fever and unexpected weight change  HENT: Positive for postnasal drip, rhinorrhea and sinus pressure  Negative for trouble swallowing  Eyes: Negative for visual disturbance  Respiratory: Positive for cough  Negative for chest tightness, shortness of breath and wheezing  Cardiovascular: Negative for chest pain  Gastrointestinal: Negative for abdominal distention, abdominal pain, blood in stool, constipation and diarrhea  Endocrine: Negative for polyuria  Genitourinary: Negative for difficulty urinating and flank pain  Musculoskeletal: Negative for arthralgias and myalgias  Skin: Negative for rash  Neurological: Negative for dizziness and light-headedness  Hematological: Negative for adenopathy  Does not bruise/bleed easily  Psychiatric/Behavioral: Negative for sleep disturbance           Objective:      /88   Pulse 76   Resp 18   Ht 5' 8" (1 727 m)   Wt 87 1 kg (192 lb)   SpO2 93%   BMI 29 19 kg/m²          Physical Exam   Constitutional: He is oriented to person, place, and time  He appears well-developed and well-nourished  No distress  HENT:   Head: Normocephalic  Eyes: Pupils are equal, round, and reactive to light  Neck: No tracheal deviation present  No thyromegaly present  Cardiovascular: Normal rate, regular rhythm and normal heart sounds  No murmur heard  Pulmonary/Chest: Effort normal  No respiratory distress  He has no wheezes  He has no rales  Abdominal: Soft  He exhibits no distension  There is no tenderness  Musculoskeletal: Normal range of motion  He exhibits no edema  Neurological: He is alert and oriented to person, place, and time  No cranial nerve deficit  Skin: Skin is warm  He is not diaphoretic  Psychiatric: He has a normal mood and affect   Judgment and thought content normal

## 2018-04-06 NOTE — ASSESSMENT & PLAN NOTE
Pulmonary hypertension remains stable  Repeat echocardiogram prior to his pulmonary follow-up in September

## 2018-04-06 NOTE — ASSESSMENT & PLAN NOTE
His allergies are persistent  He is having a lot of postnasal drip despite Flonase  I placed him on Atrovent nasal spray 2 sprays per nostril 4 times daily as needed

## 2018-05-21 DIAGNOSIS — F51.01 PRIMARY INSOMNIA: Primary | ICD-10-CM

## 2018-05-21 RX ORDER — ZOLPIDEM TARTRATE 10 MG/1
10 TABLET ORAL
Qty: 30 TABLET | Refills: 2 | OUTPATIENT
Start: 2018-05-21 | End: 2018-12-27 | Stop reason: SDUPTHER

## 2018-06-21 ENCOUNTER — TELEPHONE (OUTPATIENT)
Dept: FAMILY MEDICINE CLINIC | Facility: CLINIC | Age: 68
End: 2018-06-21

## 2018-06-21 DIAGNOSIS — I10 ESSENTIAL HYPERTENSION, BENIGN: Primary | ICD-10-CM

## 2018-06-21 NOTE — TELEPHONE ENCOUNTER
Feng Hicks called and said that every six months you and his cancer doctor order blood work, and he would like you to put that order in for him

## 2018-06-25 NOTE — TELEPHONE ENCOUNTER
It looks like Dr Yas Napoles, has already ordered these test and some additional's  Lydia printed a mailed to pts home address

## 2018-07-05 ENCOUNTER — TELEPHONE (OUTPATIENT)
Dept: FAMILY MEDICINE CLINIC | Facility: CLINIC | Age: 68
End: 2018-07-05

## 2018-07-31 DIAGNOSIS — J30.1 ALLERGIC RHINITIS DUE TO POLLEN, UNSPECIFIED SEASONALITY: Primary | ICD-10-CM

## 2018-07-31 RX ORDER — MONTELUKAST SODIUM 10 MG/1
10 TABLET ORAL DAILY
Qty: 90 TABLET | Refills: 3 | Status: SHIPPED | OUTPATIENT
Start: 2018-07-31 | End: 2019-12-05 | Stop reason: SDUPTHER

## 2018-08-08 ENCOUNTER — LAB (OUTPATIENT)
Dept: LAB | Age: 68
End: 2018-08-08
Payer: MEDICARE

## 2018-08-08 DIAGNOSIS — C91.10 CLL (CHRONIC LYMPHOCYTIC LEUKEMIA) (HCC): ICD-10-CM

## 2018-08-08 LAB
ALBUMIN SERPL BCP-MCNC: 4.1 G/DL (ref 3.5–5)
ALP SERPL-CCNC: 77 U/L (ref 46–116)
ALT SERPL W P-5'-P-CCNC: 24 U/L (ref 12–78)
ANION GAP SERPL CALCULATED.3IONS-SCNC: 6 MMOL/L (ref 4–13)
ANISOCYTOSIS BLD QL SMEAR: PRESENT
AST SERPL W P-5'-P-CCNC: 18 U/L (ref 5–45)
BASOPHILS # BLD MANUAL: 0 THOUSAND/UL (ref 0–0.1)
BASOPHILS NFR MAR MANUAL: 0 % (ref 0–1)
BILIRUB SERPL-MCNC: 0.71 MG/DL (ref 0.2–1)
BUN SERPL-MCNC: 7 MG/DL (ref 5–25)
CALCIUM SERPL-MCNC: 8.9 MG/DL (ref 8.3–10.1)
CHLORIDE SERPL-SCNC: 98 MMOL/L (ref 100–108)
CO2 SERPL-SCNC: 28 MMOL/L (ref 21–32)
CREAT SERPL-MCNC: 0.87 MG/DL (ref 0.6–1.3)
EOSINOPHIL # BLD MANUAL: 0 THOUSAND/UL (ref 0–0.4)
EOSINOPHIL NFR BLD MANUAL: 0 % (ref 0–6)
ERYTHROCYTE [DISTWIDTH] IN BLOOD BY AUTOMATED COUNT: 14.3 % (ref 11.6–15.1)
FERRITIN SERPL-MCNC: 19 NG/ML (ref 8–388)
GFR SERPL CREATININE-BSD FRML MDRD: 91 ML/MIN/1.73SQ M
GLUCOSE P FAST SERPL-MCNC: 103 MG/DL (ref 65–99)
HCT VFR BLD AUTO: 34.4 % (ref 36.5–49.3)
HGB BLD-MCNC: 11.2 G/DL (ref 12–17)
LDH SERPL-CCNC: 146 U/L (ref 81–234)
LYMPHOCYTES # BLD AUTO: 20.67 THOUSAND/UL (ref 0.6–4.47)
LYMPHOCYTES # BLD AUTO: 88 % (ref 14–44)
MCH RBC QN AUTO: 29.3 PG (ref 26.8–34.3)
MCHC RBC AUTO-ENTMCNC: 32.6 G/DL (ref 31.4–37.4)
MCV RBC AUTO: 90 FL (ref 82–98)
MONOCYTES # BLD AUTO: 0.47 THOUSAND/UL (ref 0–1.22)
MONOCYTES NFR BLD: 2 % (ref 4–12)
NEUTROPHILS # BLD MANUAL: 1.88 THOUSAND/UL (ref 1.85–7.62)
NEUTS SEG NFR BLD AUTO: 8 % (ref 43–75)
NRBC BLD AUTO-RTO: 0 /100 WBCS
OVALOCYTES BLD QL SMEAR: PRESENT
PLATELET # BLD AUTO: 191 THOUSANDS/UL (ref 149–390)
PLATELET BLD QL SMEAR: ADEQUATE
PMV BLD AUTO: 9.6 FL (ref 8.9–12.7)
POIKILOCYTOSIS BLD QL SMEAR: PRESENT
POTASSIUM SERPL-SCNC: 4.5 MMOL/L (ref 3.5–5.3)
PROT SERPL-MCNC: 8.1 G/DL (ref 6.4–8.2)
RBC # BLD AUTO: 3.82 MILLION/UL (ref 3.88–5.62)
SICKLE CELLS BLD QL SMEAR: PRESENT
SODIUM SERPL-SCNC: 132 MMOL/L (ref 136–145)
TOTAL CELLS COUNTED SPEC: 100
VARIANT LYMPHS # BLD AUTO: 2 %
WBC # BLD AUTO: 23.49 THOUSAND/UL (ref 4.31–10.16)

## 2018-08-08 PROCEDURE — 82728 ASSAY OF FERRITIN: CPT

## 2018-08-08 PROCEDURE — 85027 COMPLETE CBC AUTOMATED: CPT

## 2018-08-08 PROCEDURE — 36415 COLL VENOUS BLD VENIPUNCTURE: CPT

## 2018-08-08 PROCEDURE — 85007 BL SMEAR W/DIFF WBC COUNT: CPT

## 2018-08-08 PROCEDURE — 83615 LACTATE (LD) (LDH) ENZYME: CPT

## 2018-08-08 PROCEDURE — 80053 COMPREHEN METABOLIC PANEL: CPT

## 2018-08-14 RX ORDER — GABAPENTIN 300 MG/1
CAPSULE ORAL
COMMUNITY
End: 2020-01-02 | Stop reason: ALTCHOICE

## 2018-08-14 RX ORDER — LISINOPRIL 40 MG/1
TABLET ORAL
COMMUNITY
End: 2019-04-10 | Stop reason: ALTCHOICE

## 2018-08-15 ENCOUNTER — OFFICE VISIT (OUTPATIENT)
Dept: HEMATOLOGY ONCOLOGY | Facility: CLINIC | Age: 68
End: 2018-08-15
Payer: MEDICARE

## 2018-08-15 ENCOUNTER — OFFICE VISIT (OUTPATIENT)
Dept: FAMILY MEDICINE CLINIC | Facility: CLINIC | Age: 68
End: 2018-08-15
Payer: MEDICARE

## 2018-08-15 VITALS
RESPIRATION RATE: 18 BRPM | TEMPERATURE: 97.5 F | HEART RATE: 58 BPM | SYSTOLIC BLOOD PRESSURE: 130 MMHG | BODY MASS INDEX: 29.25 KG/M2 | OXYGEN SATURATION: 95 % | WEIGHT: 193 LBS | HEIGHT: 68 IN | DIASTOLIC BLOOD PRESSURE: 66 MMHG

## 2018-08-15 VITALS
HEIGHT: 68 IN | WEIGHT: 190.8 LBS | BODY MASS INDEX: 28.92 KG/M2 | HEART RATE: 64 BPM | SYSTOLIC BLOOD PRESSURE: 152 MMHG | DIASTOLIC BLOOD PRESSURE: 80 MMHG | TEMPERATURE: 99.6 F | RESPIRATION RATE: 20 BRPM

## 2018-08-15 DIAGNOSIS — I10 ESSENTIAL HYPERTENSION: ICD-10-CM

## 2018-08-15 DIAGNOSIS — C91.10 CLL (CHRONIC LYMPHOCYTIC LEUKEMIA) (HCC): Primary | Chronic | ICD-10-CM

## 2018-08-15 DIAGNOSIS — I27.20 PULMONARY HYPERTENSION (HCC): Primary | ICD-10-CM

## 2018-08-15 DIAGNOSIS — M16.11 PRIMARY OSTEOARTHRITIS OF RIGHT HIP: ICD-10-CM

## 2018-08-15 DIAGNOSIS — K21.9 GASTROESOPHAGEAL REFLUX DISEASE, ESOPHAGITIS PRESENCE NOT SPECIFIED: ICD-10-CM

## 2018-08-15 DIAGNOSIS — N40.0 ENLARGED PROSTATE WITHOUT LOWER URINARY TRACT SYMPTOMS (LUTS): ICD-10-CM

## 2018-08-15 DIAGNOSIS — C91.10 CLL (CHRONIC LYMPHOCYTIC LEUKEMIA) (HCC): Chronic | ICD-10-CM

## 2018-08-15 DIAGNOSIS — E78.5 HYPERLIPIDEMIA, UNSPECIFIED HYPERLIPIDEMIA TYPE: ICD-10-CM

## 2018-08-15 DIAGNOSIS — Z12.5 SCREENING FOR PROSTATE CANCER: ICD-10-CM

## 2018-08-15 PROBLEM — M50.30 DDD (DEGENERATIVE DISC DISEASE), CERVICAL: Status: ACTIVE | Noted: 2018-08-15

## 2018-08-15 PROCEDURE — 99214 OFFICE O/P EST MOD 30 MIN: CPT | Performed by: INTERNAL MEDICINE

## 2018-08-15 PROCEDURE — 99214 OFFICE O/P EST MOD 30 MIN: CPT | Performed by: FAMILY MEDICINE

## 2018-08-15 RX ORDER — AMLODIPINE BESYLATE 5 MG/1
5 TABLET ORAL EVERY EVENING
Qty: 90 TABLET | Refills: 3 | Status: SHIPPED | OUTPATIENT
Start: 2018-08-15 | End: 2019-06-19 | Stop reason: SDUPTHER

## 2018-08-15 RX ORDER — NAPROXEN 500 MG/1
500 TABLET ORAL 2 TIMES DAILY WITH MEALS
Qty: 60 TABLET | Refills: 0 | Status: SHIPPED | OUTPATIENT
Start: 2018-08-15 | End: 2019-09-25 | Stop reason: SDUPTHER

## 2018-08-15 RX ORDER — OMEPRAZOLE 40 MG/1
40 CAPSULE, DELAYED RELEASE ORAL DAILY
Qty: 90 CAPSULE | Refills: 3 | Status: SHIPPED | OUTPATIENT
Start: 2018-08-15 | End: 2019-06-19 | Stop reason: SDUPTHER

## 2018-08-15 RX ORDER — TAMSULOSIN HYDROCHLORIDE 0.4 MG/1
0.4 CAPSULE ORAL
Qty: 90 CAPSULE | Refills: 3 | Status: SHIPPED | OUTPATIENT
Start: 2018-08-15 | End: 2019-08-01 | Stop reason: SDUPTHER

## 2018-08-15 NOTE — PROGRESS NOTES
Hematology / Oncology Outpatient Follow Up Note    Annelise Para 74 y o  male BVA:[birth year] [facility]:[id number]         Date:  8/15/2018    Assessment / Plan:  A 74year old gentleman with chronic lymphocytic leukemia diagnosed in March 2010  He has lymphocytosis with no lymphadenopathy  He is currently on watchful waiting  He previously had iron deficiency anemia which was corrected by IV iron  his lymphocytosis is stable  He has minimal anemia  However, ferritin is low normal range  He is due for colonoscopy this year which I recommended him to do  He remain asymptomatic from hematology standpoint  Therefore, I recommended him to continue watchful waiting  He is in agreement with my recommendation  I will see him again in 6 months with CBC, CMP, LDH and ferritin         Subjective:      HPI:             Interval History:  A 74year old gentleman with chronic lymphocytic leukemia diagnosed in March 2010  He has been on watchful waiting because he has absolutely no symptoms from hematology standpoint  Over the last few years, he had slowly progressive microcytic anemia  He was found to have iron deficiency  He is up to date for colonoscopy  Because of his intolerance to oral iron, he was given IV iron with resolution of anemia  He came in today for follow-up    he has no new symptomatology  He denied fever, chills or night sweats  His weight is stable  He has no complaint of pain  His performance status is normal        Objective:      Primary Diagnosis:     Chronic lymphocytic leukemia  Diagnosed in March 2010  CD-5, CD-23 positive  Kappa light chain positive  CD-38 negative  ZAP-70 positive disease    Diagnosed in 2010     Cancer Staging:  No matching staging information was found for the patient         Previous Hematologic/ Oncologic Treatment:            Current Hematologic/ Oncologic Treatment:       Watchful waiting      Disease Status:      No evidence of progressive disease      Test Results:     Pathology:           Radiology:           Laboratory:     See below     Physical Exam:        General Appearance:    Alert, oriented          Eyes:    PERRL   Ears:    Normal external ear canals, both ears   Nose:   Nares normal, septum midline   Throat:   Mucosa moist  Pharynx without injection  Neck:   Supple         Lungs:     Clear to auscultation bilaterally   Chest Wall:    No tenderness or deformity    Heart:    Regular rate and rhythm         Abdomen:     Soft, non-tender, bowel sounds +, no organomegaly               Extremities:   Extremities no cyanosis or edema         Skin:   no rash or icterus  Lymph nodes:   Cervical, supraclavicular, and axillary nodes normal   Neurologic:   CNII-XII intact, normal strength, sensation and reflexes     Throughout           ROS: Review of Systems        Imaging: No results found  Labs:   Lab Results   Component Value Date    WBC 23 49 (H) 08/08/2018    HGB 11 2 (L) 08/08/2018    HCT 34 4 (L) 08/08/2018    MCV 90 08/08/2018     08/08/2018     Lab Results   Component Value Date     (L) 08/08/2018    K 4 5 08/08/2018    CL 98 (L) 08/08/2018    CO2 28 08/08/2018    ANIONGAP 6 08/08/2018    BUN 7 08/08/2018    CREATININE 0 87 08/08/2018    GLUCOSE 118 09/30/2017    GLUF 103 (H) 08/08/2018    CALCIUM 8 9 08/08/2018    AST 18 08/08/2018    ALT 24 08/08/2018    ALKPHOS 77 08/08/2018    PROT 8 1 08/08/2018    BILITOT 0 71 08/08/2018    EGFR 91 08/08/2018         Lab Results   Component Value Date     08/08/2018       No results found for: SPEP, UPEP    Lab Results   Component Value Date    PSA 0 1 02/12/2018    PSA 0 2 02/15/2017    PSA 0 1 02/03/2016         Lab Results   Component Value Date    FERRITIN 19 08/08/2018         Lab Results   Component Value Date    FOLATE 11 2 02/23/2017         Current Medications: Reviewed  Allergies: Reviewed  PMH/FH/SH:  Reviewed      Vital Sign:    Body surface area is 2 01 meters squared      Wt Readings from Last 3 Encounters:   08/15/18 87 5 kg (193 lb)   04/06/18 87 1 kg (192 lb)   03/12/18 86 2 kg (190 lb)        Temp Readings from Last 3 Encounters:   08/15/18 97 5 °F (36 4 °C) (Tympanic)   02/26/18 98 2 °F (36 8 °C)   02/22/18 98 7 °F (37 1 °C) (Tympanic)        BP Readings from Last 3 Encounters:   08/15/18 130/66   04/06/18 138/88   03/12/18 142/80         Pulse Readings from Last 3 Encounters:   08/15/18 58   04/06/18 76   03/12/18 65     @LASTSAO2(3)@

## 2018-08-15 NOTE — PROGRESS NOTES
Assessment/Plan:    CLL (chronic lymphocytic leukemia) ([facility], [address] )      Everything has been stable without progression  Will continue with lab work and oncology visits every 6 months  Essential hypertension    Blood pressure is stable  Will continue current regimen  Esophageal reflux    Reflux symptoms are stable on omeprazole 40 mg daily  Diagnoses and all orders for this visit:    Pulmonary hypertension (HCC)  -     amLODIPine (NORVASC) 5 mg tablet; Take 1 tablet (5 mg total) by mouth every evening    Gastroesophageal reflux disease, esophagitis presence not specified  -     omeprazole (PriLOSEC) 40 MG capsule; Take 1 capsule (40 mg total) by mouth daily    Enlarged prostate without lower urinary tract symptoms (luts)  -     tamsulosin (FLOMAX) 0 4 mg; Take 1 capsule (0 4 mg total) by mouth daily with dinner    Primary osteoarthritis of right hip  -     naproxen (EC NAPROSYN) 500 MG EC tablet; Take 1 tablet (500 mg total) by mouth 2 (two) times a day with meals    CLL (chronic lymphocytic leukemia) ([facility])    Essential hypertension    Other orders  -     lisinopril (ZESTRIL) 40 mg tablet; lisinopril 40 mg tablet  -     gabapentin (NEURONTIN) 300 mg capsule; gabapentin 300 mg capsule          Subjective:      Patient ID: Lennie Whitman is a 74 y o  male  He is here for follow-up  He denies recent problem  CLL is stable, just saw oncologist  BP has been good at home        The following portions of the patient's history were reviewed and updated as appropriate: allergies, current medications, past family history, past medical history, past social history, past surgical history and problem list     Review of Systems   Constitutional: Negative for activity change, chills, fatigue and fever  HENT: Negative for congestion, ear pain, sinus pressure and sore throat  Eyes: Negative for pain and visual disturbance  Respiratory: Negative for cough, chest tightness, shortness of breath and wheezing  Cardiovascular: Negative for chest pain, palpitations and leg swelling  Gastrointestinal: Negative for abdominal pain, blood in stool, constipation, diarrhea, nausea and vomiting  Endocrine: Negative for polydipsia and polyuria  Genitourinary: Negative for difficulty urinating, dysuria, frequency and urgency  Musculoskeletal: Negative for arthralgias, joint swelling and myalgias  Skin: Negative for rash  Neurological: Negative for dizziness, weakness, numbness and headaches  Hematological: Negative for adenopathy  Does not bruise/bleed easily  Psychiatric/Behavioral: Negative for dysphoric mood  The patient is not nervous/anxious  Objective:      /80   Pulse 64   Temp 99 6 °F (37 6 °C)   Resp 20   Ht 5' 8" (1 727 m)   Wt 86 5 kg (190 lb 12 8 oz)   BMI 29 01 kg/m²          Physical Exam   Constitutional: He is oriented to person, place, and time  He appears well-developed and well-nourished  No distress  HENT:   Left Ear: External ear normal    Eyes: Conjunctivae and EOM are normal  Pupils are equal, round, and reactive to light  No scleral icterus  Neck: Normal range of motion  Neck supple  No thyromegaly present  Cardiovascular: Normal rate, regular rhythm and normal heart sounds  No murmur heard  Pulmonary/Chest: Effort normal and breath sounds normal  He has no wheezes  He has no rales  Musculoskeletal: He exhibits no tenderness or deformity  Lymphadenopathy:     He has no cervical adenopathy  Neurological: He is alert and oriented to person, place, and time  He has normal reflexes  No cranial nerve deficit  Skin: Skin is warm and dry  No rash noted  No erythema  Psychiatric: He has a normal mood and affect  His behavior is normal    Nursing note and vitals reviewed

## 2018-08-15 NOTE — ASSESSMENT & PLAN NOTE
Everything has been stable without progression  Will continue with lab work and oncology visits every 6 months

## 2018-09-25 DIAGNOSIS — E78.00 HYPERCHOLESTEREMIA: ICD-10-CM

## 2018-09-25 DIAGNOSIS — I10 BENIGN ESSENTIAL HTN: Primary | ICD-10-CM

## 2018-09-25 RX ORDER — SIMVASTATIN 20 MG
TABLET ORAL
Qty: 90 TABLET | Refills: 0 | Status: SHIPPED | OUTPATIENT
Start: 2018-09-25 | End: 2018-12-27 | Stop reason: SDUPTHER

## 2018-09-25 RX ORDER — MINOXIDIL 10 MG/1
TABLET ORAL
Qty: 90 TABLET | Refills: 0 | Status: SHIPPED | OUTPATIENT
Start: 2018-09-25 | End: 2018-12-27 | Stop reason: SDUPTHER

## 2018-10-01 ENCOUNTER — IMMUNIZATION (OUTPATIENT)
Dept: FAMILY MEDICINE CLINIC | Facility: CLINIC | Age: 68
End: 2018-10-01
Payer: MEDICARE

## 2018-10-01 DIAGNOSIS — Z23 NEED FOR INFLUENZA VACCINATION: Primary | ICD-10-CM

## 2018-10-01 PROCEDURE — 90662 IIV NO PRSV INCREASED AG IM: CPT | Performed by: FAMILY MEDICINE

## 2018-10-01 PROCEDURE — G0008 ADMIN INFLUENZA VIRUS VAC: HCPCS | Performed by: FAMILY MEDICINE

## 2018-10-02 ENCOUNTER — TELEPHONE (OUTPATIENT)
Dept: PULMONOLOGY | Facility: CLINIC | Age: 68
End: 2018-10-02

## 2018-10-03 ENCOUNTER — HOSPITAL ENCOUNTER (OUTPATIENT)
Dept: NON INVASIVE DIAGNOSTICS | Facility: CLINIC | Age: 68
Discharge: HOME | End: 2018-10-03
Payer: MEDICARE

## 2018-10-03 DIAGNOSIS — I27.20 PULMONARY HYPERTENSION (HCC): ICD-10-CM

## 2018-10-03 PROCEDURE — 93306 TTE W/DOPPLER COMPLETE: CPT

## 2018-10-03 PROCEDURE — 93306 TTE W/DOPPLER COMPLETE: CPT | Performed by: INTERNAL MEDICINE

## 2018-10-17 ENCOUNTER — OFFICE VISIT (OUTPATIENT)
Dept: PULMONOLOGY | Facility: CLINIC | Age: 68
End: 2018-10-17
Payer: MEDICARE

## 2018-10-17 VITALS
DIASTOLIC BLOOD PRESSURE: 62 MMHG | HEIGHT: 68 IN | RESPIRATION RATE: 18 BRPM | WEIGHT: 194.4 LBS | BODY MASS INDEX: 29.46 KG/M2 | SYSTOLIC BLOOD PRESSURE: 148 MMHG | TEMPERATURE: 99.6 F | HEART RATE: 77 BPM | OXYGEN SATURATION: 97 %

## 2018-10-17 DIAGNOSIS — I27.20 PULMONARY HYPERTENSION (HCC): Primary | ICD-10-CM

## 2018-10-17 PROBLEM — G47.33 OSA (OBSTRUCTIVE SLEEP APNEA): Status: ACTIVE | Noted: 2018-10-17

## 2018-10-17 PROBLEM — Z01.818 PREOP EXAMINATION: Status: RESOLVED | Noted: 2018-02-26 | Resolved: 2018-10-17

## 2018-10-17 PROCEDURE — 94618 PULMONARY STRESS TESTING: CPT | Performed by: INTERNAL MEDICINE

## 2018-10-17 PROCEDURE — 99215 OFFICE O/P EST HI 40 MIN: CPT | Performed by: INTERNAL MEDICINE

## 2018-10-17 NOTE — TELEPHONE ENCOUNTER
Called patient and informed him that his echo is scheduled for 4/2/19 at 9am and he is to set up an appointment with [facility] for April following the test

## 2018-10-17 NOTE — PROGRESS NOTES
Progress note - Pulmonary Medicine   Gisela Patiño 79 y o  male MRN: [id number]       Addendum:  I discussed the echocardiogram with Dr Marie Mendoza who reviewed the study again  He does think that 1 of the parameters may have over estimated pulmonary hypertension and that the pulmonary pressures are likely closer to the 45-50 mm of mercury range  He agreed that appropriate follow-up would be interval echocardiogram in 6 months      Impression & Plan:     Pulmonary hypertension ([address] )  · 10/03/2018 repeat echocardiogram suggest pulmonary systolic pressure 60 mm of mercury which would be consistent with severe pulmonary hypertension  · Previous estimation in September 2017 suggested 35 mm of mercury  This is a significant increase but is not consistent with any symptoms or ambulatory oximetry  · I have contacted Dr Marie Mendoza and asked him to take a second look at the echocardiogram to assess the quality of the tricuspid jet to see if this seems accurate  · I would favor a conservative approach at this point  He does not have symptoms or any overt exam findings to suggest right-sided cardiac dysfunction  The right ventricle is also functioning well which is out of proportion to the pulmonary pressure also  · I will contact Jimbo Montiel with the result of Dr Katherine Malik review  Would not favor proceeding with any additional workup at this point and repeat echocardiogram in 6 months  Follow-up will be determined based on the results of the discussion with Dr Marie Mendoza    ______________________________________________________________________    HPI:    Gisela Patiño presents today for follow-up of pulmonary hypertension  He reports that he has been doing well  He has no shortness of Breath  He is able to walk up stairs without getting winded  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  He denies cough or phlegm production  He has no wheezing  He denies any chest pain or chest tightness    He has not had leg swelling or abdominal bloating  He has no calf tenderness or cramping  He has no personal history of pulmonary embolism  He was previously diagnosed with mild sleep apnea  He is overweight and does snore  He has not been treated for obstructive sleep apnea however  His wife is accompanying him today and does agree to the snoring but has not witnessed any apneas  Appetite and weight have been stable  No fever, chills or other constitutional complaints  No headache or lightheadedness  No sore throat  No GERD symptoms  Review of Systems:  Review of Systems   All other systems reviewed and are negative  Past medical history, surgical history, and family history were reviewed and updated as appropriate    Social history updates:  History   Smoking Status    Former Smoker    Packs/day: 1 00    Years: 15 00    Types: Cigarettes    Start date: 1965    Quit date: 9/5/1980   Smokeless Tobacco    Never Used       PhysicalExamination:  Vitals:   /62 (BP Location: Left arm, Patient Position: Sitting, Cuff Size: Standard)   Pulse 77   Temp 99 6 °F (37 6 °C) (Tympanic)   Resp 18   Ht 5' 8" (1 727 m)   Wt 88 2 kg (194 lb 6 4 oz)   SpO2 97%   BMI 29 56 kg/m²     Gen:  Comfortable on room air and without respiratory distress  HEENT: PERRL  Oropharynx is clear without any erythema or exudate  Neck: Supple  There is no JVD, lymphadenopathy or thyromegaly appreciated  Trachea is midline  Chest: Symmetric chest wall excursion  Lung fields are clear to auscultation  No wheezes, rales or rhonchi  Normal resonance to percussion  Cardiac: Regular rate and rhythm  There are no murmurs  Abdomen: Soft and nontender  Benign  Extremities: No clubbing, cyanosis or edema  Neurologic: No focal deficits  Skin: No appreciable rashes  Diagnostic Data:  Labs:   I personally reviewed the most recent laboratory data pertinent to today's visit    Lab Results   Component Value Date    WBC 23 49 (H) 08/08/2018    HGB 11 2 (L) 08/08/2018    HCT 34 4 (L) 08/08/2018    MCV 90 08/08/2018     08/08/2018     Lab Results   Component Value Date    GLUCOSE 119 07/20/2015    CALCIUM 8 9 08/08/2018     (L) 08/08/2018    K 4 5 08/08/2018    CO2 28 08/08/2018    CL 98 (L) 08/08/2018    BUN 7 08/08/2018    CREATININE 0 87 08/08/2018     Lab Results   Component Value Date    ALT 24 08/08/2018    AST 18 08/08/2018    ALKPHOS 77 08/08/2018    BILITOT 0 47 07/20/2015     Imaging:  No pertinent imaging    Other studies:  Echocardiogram report was reviewed  This would suggest severe pulmonary hypertension with an estimated pulmonary pressure of 60 mm of mercury  There is no right ventricular dysfunction or septal flattening described however    Left ventricular ejection fraction is normal    Karla Salcido MD

## 2018-10-17 NOTE — ASSESSMENT & PLAN NOTE
· 10/03/2018 repeat echocardiogram suggest pulmonary systolic pressure 60 mm of mercury which would be consistent with severe pulmonary hypertension  · Previous estimation in September 2017 suggested 35 mm of mercury  This is a significant increase but is not consistent with any symptoms or ambulatory oximetry  · I have contacted Dr Abran Valenzuela and asked him to take a second look at the echocardiogram to assess the quality of the tricuspid jet to see if this seems accurate  · I would favor a conservative approach at this point  He does not have symptoms or any overt exam findings to suggest right-sided cardiac dysfunction  The right ventricle is also functioning well which is out of proportion to the pulmonary pressure also  · I will contact Bouchra Angel with the result of Dr Medina Wyman review  Would not favor proceeding with any additional workup at this point and repeat echocardiogram in 6 months

## 2018-10-17 NOTE — ASSESSMENT & PLAN NOTE
· Polysomnogram was very remote and he reports that he was told it was mild    · Not on any therapy at this time but this may be contributing to pulmonary hypertension

## 2018-10-17 NOTE — LETTER
October 17, 2018     Samantha Marie MD  [address]  [address] [facility]    Patient: Helene Vyas   YOB: 1950   Date of Visit: 10/17/2018       Dear Dr Homer Moreno: Thank you for referring Chandana Butler to me for evaluation  Below are my notes for this consultation  If you have questions, please do not hesitate to call me  I look forward to following your patient along with you  Sincerely,        Manas Macdonald MD        CC: MD Manas Sherman MD  10/17/2018  3:13 PM  Sign at close encounter    Progress note - Pulmonary Medicine   Helene Vyas 79 y o  male MRN: [id number]       Addendum:  I discussed the echocardiogram with Dr Arnol Armijo who reviewed the study again  He does think that 1 of the parameters may have over estimated pulmonary hypertension and that the pulmonary pressures are likely closer to the 45-50 mm of mercury range  He agreed that appropriate follow-up would be interval echocardiogram in 6 months      Impression & Plan:     Pulmonary hypertension ([address] )  · 10/03/2018 repeat echocardiogram suggest pulmonary systolic pressure 60 mm of mercury which would be consistent with severe pulmonary hypertension  · Previous estimation in September 2017 suggested 35 mm of mercury  This is a significant increase but is not consistent with any symptoms or ambulatory oximetry  · I have contacted Dr Arnol Armijo and asked him to take a second look at the echocardiogram to assess the quality of the tricuspid jet to see if this seems accurate  · I would favor a conservative approach at this point  He does not have symptoms or any overt exam findings to suggest right-sided cardiac dysfunction  The right ventricle is also functioning well which is out of proportion to the pulmonary pressure also  · I will contact Johann Saleem with the result of Dr Goran Del Toro review     Would not favor proceeding with any additional workup at this point and repeat echocardiogram in 6 months  Follow-up will be determined based on the results of the discussion with Dr Art Woo    ______________________________________________________________________    HPI:    Sadi Bridges presents today for follow-up of pulmonary hypertension  He reports that he has been doing well  He has no shortness of Breath  He is able to walk up stairs without getting winded  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  He denies cough or phlegm production  He has no wheezing  He denies any chest pain or chest tightness  He has not had leg swelling or abdominal bloating  He has no calf tenderness or cramping  He has no personal history of pulmonary embolism  He was previously diagnosed with mild sleep apnea  He is overweight and does snore  He has not been treated for obstructive sleep apnea however  His wife is accompanying him today and does agree to the snoring but has not witnessed any apneas  Appetite and weight have been stable  No fever, chills or other constitutional complaints  No headache or lightheadedness  No sore throat  No GERD symptoms  Review of Systems:  Review of Systems   All other systems reviewed and are negative  Past medical history, surgical history, and family history were reviewed and updated as appropriate    Social history updates:  History   Smoking Status    Former Smoker    Packs/day: 1 00    Years: 15 00    Types: Cigarettes    Start date: 1965    Quit date: 9/5/1980   Smokeless Tobacco    Never Used       PhysicalExamination:  Vitals:   /62 (BP Location: Left arm, Patient Position: Sitting, Cuff Size: Standard)   Pulse 77   Temp 99 6 °F (37 6 °C) (Tympanic)   Resp 18   Ht 5' 8" (1 727 m)   Wt 88 2 kg (194 lb 6 4 oz)   SpO2 97%   BMI 29 56 kg/m²      Gen:  Comfortable on room air and without respiratory distress  HEENT: PERRL   Oropharynx is clear without any erythema or exudate  Neck: Supple  There is no JVD, lymphadenopathy or thyromegaly appreciated  Trachea is midline  Chest: Symmetric chest wall excursion  Lung fields are clear to auscultation  No wheezes, rales or rhonchi  Normal resonance to percussion  Cardiac: Regular rate and rhythm  There are no murmurs  Abdomen: Soft and nontender  Benign  Extremities: No clubbing, cyanosis or edema  Neurologic: No focal deficits  Skin: No appreciable rashes  Diagnostic Data:  Labs: I personally reviewed the most recent laboratory data pertinent to today's visit    Lab Results   Component Value Date    WBC 23 49 (H) 08/08/2018    HGB 11 2 (L) 08/08/2018    HCT 34 4 (L) 08/08/2018    MCV 90 08/08/2018     08/08/2018     Lab Results   Component Value Date    GLUCOSE 119 07/20/2015    CALCIUM 8 9 08/08/2018     (L) 08/08/2018    K 4 5 08/08/2018    CO2 28 08/08/2018    CL 98 (L) 08/08/2018    BUN 7 08/08/2018    CREATININE 0 87 08/08/2018     Lab Results   Component Value Date    ALT 24 08/08/2018    AST 18 08/08/2018    ALKPHOS 77 08/08/2018    BILITOT 0 47 07/20/2015     Imaging:  No pertinent imaging    Other studies:  Echocardiogram report was reviewed  This would suggest severe pulmonary hypertension with an estimated pulmonary pressure of 60 mm of mercury  There is no right ventricular dysfunction or septal flattening described however    Left ventricular ejection fraction is normal    Adrienne Saenz MD

## 2018-11-09 ENCOUNTER — OFFICE VISIT (OUTPATIENT)
Dept: CARDIOLOGY CLINIC | Facility: CLINIC | Age: 68
End: 2018-11-09
Payer: MEDICARE

## 2018-11-09 VITALS
HEIGHT: 68 IN | BODY MASS INDEX: 28.64 KG/M2 | SYSTOLIC BLOOD PRESSURE: 146 MMHG | HEART RATE: 66 BPM | DIASTOLIC BLOOD PRESSURE: 86 MMHG | WEIGHT: 189 LBS

## 2018-11-09 DIAGNOSIS — I10 ESSENTIAL HYPERTENSION: Primary | ICD-10-CM

## 2018-11-09 DIAGNOSIS — I27.20 PULMONARY HYPERTENSION (HCC): ICD-10-CM

## 2018-11-09 DIAGNOSIS — R94.31 ABNORMAL EKG: ICD-10-CM

## 2018-11-09 PROCEDURE — 99214 OFFICE O/P EST MOD 30 MIN: CPT | Performed by: INTERNAL MEDICINE

## 2018-11-09 NOTE — PROGRESS NOTES
Cardiology Follow Up    Royal Nicks  1950  [id number]  [address] [facility] [address] [place] [address]  [phone]    1  Essential hypertension     2  Pulmonary hypertension ([facility] )     3  Abnormal EKG         Interval History:   Cardiology follow-up  Patient doing overall okay, denies any significant dyspnea there is no chest pain  States been compliant with low-sodium diet, blood pressure is mostly well controlled  Compliant with low-cholesterol diet on statin therapy  Recent lipid profile total cholesterol 137 with an LDL of 62 and an HDL 58 adequate control  He recently undergo an echocardiogram, personally reviewed  The suggesting of significant pulmonary hypertension which is systolic pulmonary artery pressure estimated in the 60 range, up from 35 to 40 previously    There is no evidence of secondary volume or pressure overload on the right heart    Patient Active Problem List   Diagnosis    Essential hypertension    CLL (chronic lymphocytic leukemia) (HCC)    Primary osteoarthritis of right hip    Allergic rhinitis due to pollen    Erectile dysfunction of non-organic origin    Esophageal reflux    Pulmonary [facility] ([facility] )    Spinal stenosis of lumbar region    Enlarged prostate without lower urinary tract symptoms (luts)    Iron deficiency anemia    Insomnia    Arthritis of right hip    Abnormal EKG    DDD (degenerative disc disease), cervical    DIXIE not currently treated     Past Medical History:   Diagnosis Date    Anxiety     BPH (benign prostatic hypertrophy)     CLL (chronic lymphocytic leukemia) ([facility] )     2009    Concussion     Resolved: 08/22/16    Depression     Gastrointestinal hemorrhage     Last assessed: 08/27/13    GERD (gastroesophageal reflux disease)     Hearing loss of aging     Hiatal hernia     Hyperlipidemia     Hypertension     Iron deficiency anemia     Microscopic hematuria     Last assessed: 06/28/13    OA (osteoarthritis)     right hip    Pneumothorax     Prostatitis     Pulmonary hypertension (HCC)     Seasonal allergies     UTI (urinary tract infection)     in past    Wears glasses      Social History     Social History    Marital status: /Civil Union     Spouse name: N/A    Number of children: N/A    Years of education: N/A     Occupational History    Not on file       Social History Main Topics    Smoking status: Former Smoker     Packs/day: 1 00     Years: 15 00     Types: Cigarettes     Start date: 1965     Quit date: 9/5/1980    Smokeless tobacco: Never Used    Alcohol use Yes      Comment: 4 beers day    Drug use: Yes     Types: Marijuana      Comment: few x month    Sexual activity: Not on file     Other Topics Concern    Not on file     Social History Narrative    No narrative on file      Family History   Problem Relation Age of Onset    No Known Problems Mother     Heart attack Father     Diabetes Brother     Prostate cancer Brother      Past Surgical History:   Procedure Laterality Date    COLONOSCOPY      CYSTOSCOPY  10/09/2014    Diagnostic    FRACTURE SURGERY      left lower arm    FRACTURE SURGERY      left femur    HERNIA REPAIR      OTHER SURGICAL HISTORY  03/2011    Spinal anesthesia epidural    MI TOTAL HIP ARTHROPLASTY Right 9/29/2017    Procedure: ARTHROPLASTY HIP TOTAL ANTERIOR;  Surgeon: Jack Rust MD;  Location: [facility];  Service: Orthopedics    TONSILLECTOMY AND ADENOIDECTOMY      TRANSURETHRAL RESECTION OF PROSTATE      x 2    WRIST SURGERY         Current Outpatient Prescriptions:     amLODIPine (NORVASC) 5 mg tablet, Take 1 tablet (5 mg total) by mouth every evening, Disp: 90 tablet, Rfl: 3    aspirin 81 MG tablet, Take 1 tablet by mouth daily, Disp: , Rfl:     carvedilol (COREG) 12 5 mg tablet, Take 1 tablet (12 5 mg total) by mouth 2 (two) times a day with meals, Disp: 180 tablet, Rfl: 3    fluticasone (FLONASE) 50 mcg/act nasal spray, 2 sprays into each nostril daily, Disp: , Rfl:     gabapentin (NEURONTIN) 300 mg capsule, gabapentin 300 mg capsule, Disp: , Rfl:     ipratropium (ATROVENT) 0 06 % nasal spray, 2 sprays into each nostril 4 (four) times a day, Disp: 45 mL, Rfl: 3    lisinopril (ZESTRIL) 40 mg tablet, lisinopril 40 mg tablet, Disp: , Rfl:     losartan (COZAAR) 100 MG tablet, Take 1 tablet (100 mg total) by mouth daily, Disp: 90 tablet, Rfl: 3    Milk Thistle 1000 MG CAPS, Take by mouth, Disp: , Rfl:     minoxidil (LONITEN) 10 mg tablet, TAKE 1 TABLET BY MOUTH ONCE DAILY, Disp: 90 tablet, Rfl: 0    montelukast (SINGULAIR) 10 mg tablet, Take 1 tablet (10 mg total) by mouth daily for 180 days, Disp: 90 tablet, Rfl: 3    naproxen (EC NAPROSYN) 500 MG EC tablet, Take 1 tablet (500 mg total) by mouth 2 (two) times a day with meals, Disp: 60 tablet, Rfl: 0    Omega-3 Fatty Acids (FISH OIL) 1,000 mg, Take by mouth daily, Disp: , Rfl:     omeprazole (PriLOSEC) 40 MG capsule, Take 1 capsule (40 mg total) by mouth daily, Disp: 90 capsule, Rfl: 3    senna (SENOKOT) 8 6 mg, Take 1 tablet by mouth daily, Disp: 120 each, Rfl: 0    sertraline (ZOLOFT) 50 mg tablet, Take 1 tablet (50 mg total) by mouth daily, Disp: 90 tablet, Rfl: 0    SHINGRIX 50 MCG SUSR, inject 0 5 milliliter intramuscularly, Disp: , Rfl: 0    simvastatin (ZOCOR) 20 mg tablet, TAKE ONE TABLET BY MOUTH AT BEDTIME, Disp: 90 tablet, Rfl: 0    tamsulosin (FLOMAX) 0 4 mg, Take 1 capsule (0 4 mg total) by mouth daily with dinner, Disp: 90 capsule, Rfl: 3    zolpidem (AMBIEN) 10 mg tablet, Take 1 tablet (10 mg total) by mouth daily at bedtime as needed for sleep, Disp: 30 tablet, Rfl: 2  Allergies   Allergen Reactions    Codeine Other (See Comments)     agitated    Bactrim [Sulfamethoxazole-Trimethoprim] GI Intolerance       Labs:  Lab on 08/08/2018   Component Date Value    WBC 08/08/2018 23 49*    RBC 08/08/2018 3 82*    Hemoglobin 08/08/2018 11 2*    Hematocrit 08/08/2018 34 4*    MCV 08/08/2018 90     MCH 08/08/2018 29 3     MCHC 08/08/2018 32 6     RDW 08/08/2018 14 3     MPV 08/08/2018 9 6     Platelets 39/05/9294 191     nRBC 08/08/2018 0     Sodium 08/08/2018 132*    Potassium 08/08/2018 4 5     Chloride 08/08/2018 98*    CO2 08/08/2018 28     ANION GAP 08/08/2018 6     BUN 08/08/2018 7     Creatinine 08/08/2018 0 87     Glucose, Fasting 08/08/2018 103*    Calcium 08/08/2018 8 9     AST 08/08/2018 18     ALT 08/08/2018 24     Alkaline Phosphatase 08/08/2018 77     Total Protein 08/08/2018 8 1     Albumin 08/08/2018 4 1     Total Bilirubin 08/08/2018 0 71     eGFR 08/08/2018 91     LD 08/08/2018 146     Ferritin 08/08/2018 19     Segmented % 08/08/2018 8*    Lymphocytes % 08/08/2018 88*    Monocytes % 08/08/2018 2*    Eosinophils, % 08/08/2018 0     Basophils % 08/08/2018 0     Atypical Lymphocytes % 08/08/2018 2*    Absolute Neutrophils 08/08/2018 1 88     Lymphocytes Absolute 08/08/2018 20 67*    Monocytes Absolute 08/08/2018 0 47     Eosinophils Absolute 08/08/2018 0 00     Basophils Absolute 08/08/2018 0 00     Total Counted 08/08/2018 100     Anisocytosis 08/08/2018 Present     Ovalocytes 08/08/2018 Present     Poikilocytes 08/08/2018 Present     Sickle Cells 08/08/2018 Present     Platelet Estimate 65/85/4046 Adequate      Imaging: No results found  Review of Systems:  Review of Systems   Constitutional: Negative for activity change and fatigue  HENT: Negative for nosebleeds and trouble swallowing  Eyes: Negative for visual disturbance  Respiratory: Negative for apnea, chest tightness, shortness of breath, wheezing and stridor  Cardiovascular: Negative for chest pain, palpitations and leg swelling  Gastrointestinal: Negative for abdominal pain  Endocrine: Negative for cold intolerance  Genitourinary: Negative for hematuria  Musculoskeletal: Negative for arthralgias and myalgias  Skin: Negative for pallor  Allergic/Immunologic: Positive for immunocompromised state  Neurological: Negative for syncope  Hematological: Does not bruise/bleed easily  Psychiatric/Behavioral: Negative for sleep disturbance  Physical Exam:  Physical Exam   Constitutional: He is oriented to person, place, and time  He appears well-developed  No distress  Eyes: No scleral icterus  Neck: No JVD present  Cardiovascular: Normal rate, regular rhythm, normal heart sounds and intact distal pulses  Exam reveals no gallop and no friction rub  No murmur heard  Pulmonary/Chest: Effort normal  No respiratory distress  He has no wheezes  He has no rales  He exhibits no tenderness  Neurological: He is alert and oriented to person, place, and time  Skin: Skin is warm and dry  He is not diaphoretic  Psychiatric: He has a normal mood and affect  Discussion/Summary:  Hypertension, mostly well controlled clinical regimen recent echocardiogram as described above  Normal left ventricular systolic function  Left ventricle thickness upper limits of normal, previously described as mild LVH  There was mild aortic insufficiency and tricuspid insufficiency, tricuspid regurgitant jet velocity of 3 9 m/sec suggesting systolic PA pressures in the 60 range   right ventricle systolic function was normal and there is no evidence of pressure volume overload the right ventricle  Uncertain whether this is accurate or not  However it is concerning  Options included proceeding with a right heart catheterization to better determine the accuracy of this pressures  With concomitant left heart catheterization to assess diastolic function  He is not very symptomatic which is speaks against significant pulmonary hypertension but does not necessarily eliminate that possibility    Will wait for the next echocardiogram   Will also do a CT scan of the lungs to look for lung pathology and also pulmonary artery size  He has been followed by Dr Daniel Cifuentes, Pulmonary Department as well

## 2018-11-13 ENCOUNTER — TELEPHONE (OUTPATIENT)
Dept: PULMONOLOGY | Facility: CLINIC | Age: 68
End: 2018-11-13

## 2018-11-13 NOTE — TELEPHONE ENCOUNTER
Patient would like to speak to Dr Lorena Arriaga regarding his recent visit with the cardiologist  Patient would like Dr Belkis Tucker option with testing that the cardiologist ordered   Please call patient at [phone] or on his cell at [phone]

## 2018-11-13 NOTE — TELEPHONE ENCOUNTER
Maya Morgan did call back  I spoke with him about his issue with Cardiology  He was considering heart catheterization  This has been deferred until follow-up echocardiogram   He will be sent for a noncontrast CT scan by Dr Sergei Reyes  I do not expect that this will add a lot to the current decision making however  I will review his study when it is available

## 2018-11-13 NOTE — TELEPHONE ENCOUNTER
I called the patient back  He did not answer his home or cellular telephone    Message left to return my call

## 2018-11-15 ENCOUNTER — HOSPITAL ENCOUNTER (OUTPATIENT)
Dept: RADIOLOGY | Age: 68
Discharge: HOME | End: 2018-11-15
Payer: MEDICARE

## 2018-11-15 DIAGNOSIS — I27.20 PULMONARY HYPERTENSION (HCC): ICD-10-CM

## 2018-11-15 DIAGNOSIS — R94.31 ABNORMAL EKG: ICD-10-CM

## 2018-11-15 PROCEDURE — 71250 CT THORAX DX C-: CPT

## 2018-12-27 DIAGNOSIS — F51.01 PRIMARY INSOMNIA: ICD-10-CM

## 2018-12-27 DIAGNOSIS — E78.00 HYPERCHOLESTEREMIA: ICD-10-CM

## 2018-12-27 DIAGNOSIS — I10 BENIGN ESSENTIAL HTN: ICD-10-CM

## 2018-12-27 RX ORDER — MINOXIDIL 10 MG/1
TABLET ORAL
Qty: 90 TABLET | Refills: 3 | Status: SHIPPED | OUTPATIENT
Start: 2018-12-27 | End: 2019-11-27 | Stop reason: SDUPTHER

## 2018-12-27 RX ORDER — SIMVASTATIN 20 MG
TABLET ORAL
Qty: 90 TABLET | Refills: 3 | Status: SHIPPED | OUTPATIENT
Start: 2018-12-27 | End: 2019-08-30 | Stop reason: ALTCHOICE

## 2018-12-27 RX ORDER — ZOLPIDEM TARTRATE 10 MG/1
TABLET ORAL
Qty: 90 TABLET | Refills: 1 | Status: SHIPPED | OUTPATIENT
Start: 2018-12-27 | End: 2019-08-12 | Stop reason: SDUPTHER

## 2019-01-11 ENCOUNTER — TELEPHONE (OUTPATIENT)
Dept: CARDIOLOGY CLINIC | Facility: CLINIC | Age: 69
End: 2019-01-11

## 2019-02-01 DIAGNOSIS — F32.A DEPRESSION, UNSPECIFIED DEPRESSION TYPE: ICD-10-CM

## 2019-02-15 ENCOUNTER — TRANSCRIBE ORDERS (OUTPATIENT)
Dept: LAB | Facility: MEDICAL CENTER | Age: 69
End: 2019-02-15

## 2019-02-18 ENCOUNTER — APPOINTMENT (OUTPATIENT)
Dept: LAB | Age: 69
End: 2019-02-18
Payer: MEDICARE

## 2019-02-18 DIAGNOSIS — C91.10 CLL (CHRONIC LYMPHOCYTIC LEUKEMIA) (HCC): Chronic | ICD-10-CM

## 2019-02-18 DIAGNOSIS — E78.5 HYPERLIPIDEMIA, UNSPECIFIED HYPERLIPIDEMIA TYPE: ICD-10-CM

## 2019-02-18 DIAGNOSIS — Z12.5 SCREENING FOR PROSTATE CANCER: ICD-10-CM

## 2019-02-18 LAB
ALBUMIN SERPL BCP-MCNC: 4.3 G/DL (ref 3.5–5)
ALP SERPL-CCNC: 74 U/L (ref 46–116)
ALT SERPL W P-5'-P-CCNC: 24 U/L (ref 12–78)
ANION GAP SERPL CALCULATED.3IONS-SCNC: 5 MMOL/L (ref 4–13)
AST SERPL W P-5'-P-CCNC: 17 U/L (ref 5–45)
BASOPHILS # BLD AUTO: 0.07 THOUSANDS/ΜL (ref 0–0.1)
BASOPHILS NFR BLD AUTO: 0 % (ref 0–1)
BILIRUB SERPL-MCNC: 0.61 MG/DL (ref 0.2–1)
BUN SERPL-MCNC: 7 MG/DL (ref 5–25)
CALCIUM SERPL-MCNC: 8.6 MG/DL (ref 8.3–10.1)
CHLORIDE SERPL-SCNC: 100 MMOL/L (ref 100–108)
CHOLEST SERPL-MCNC: 126 MG/DL (ref 50–200)
CO2 SERPL-SCNC: 27 MMOL/L (ref 21–32)
CREAT SERPL-MCNC: 0.86 MG/DL (ref 0.6–1.3)
EOSINOPHIL # BLD AUTO: 0.14 THOUSAND/ΜL (ref 0–0.61)
EOSINOPHIL NFR BLD AUTO: 1 % (ref 0–6)
ERYTHROCYTE [DISTWIDTH] IN BLOOD BY AUTOMATED COUNT: 14.7 % (ref 11.6–15.1)
FERRITIN SERPL-MCNC: 12 NG/ML (ref 8–388)
GFR SERPL CREATININE-BSD FRML MDRD: 90 ML/MIN/1.73SQ M
GLUCOSE P FAST SERPL-MCNC: 105 MG/DL (ref 65–99)
HCT VFR BLD AUTO: 30.9 % (ref 36.5–49.3)
HDLC SERPL-MCNC: 55 MG/DL (ref 40–60)
HGB BLD-MCNC: 9.7 G/DL (ref 12–17)
IMM GRANULOCYTES # BLD AUTO: 0.04 THOUSAND/UL (ref 0–0.2)
IMM GRANULOCYTES NFR BLD AUTO: 0 % (ref 0–2)
LDH SERPL-CCNC: 145 U/L (ref 81–234)
LDLC SERPL CALC-MCNC: 57 MG/DL (ref 0–100)
LYMPHOCYTES # BLD AUTO: 21.07 THOUSANDS/ΜL (ref 0.6–4.47)
LYMPHOCYTES NFR BLD AUTO: 87 % (ref 14–44)
MCH RBC QN AUTO: 27.1 PG (ref 26.8–34.3)
MCHC RBC AUTO-ENTMCNC: 31.4 G/DL (ref 31.4–37.4)
MCV RBC AUTO: 86 FL (ref 82–98)
MONOCYTES # BLD AUTO: 0.27 THOUSAND/ΜL (ref 0.17–1.22)
MONOCYTES NFR BLD AUTO: 1 % (ref 4–12)
NEUTROPHILS # BLD AUTO: 2.58 THOUSANDS/ΜL (ref 1.85–7.62)
NEUTS SEG NFR BLD AUTO: 11 % (ref 43–75)
NONHDLC SERPL-MCNC: 71 MG/DL
NRBC BLD AUTO-RTO: 0 /100 WBCS
PLATELET # BLD AUTO: 179 THOUSANDS/UL (ref 149–390)
PMV BLD AUTO: 10.3 FL (ref 8.9–12.7)
POTASSIUM SERPL-SCNC: 4 MMOL/L (ref 3.5–5.3)
PROT SERPL-MCNC: 8.2 G/DL (ref 6.4–8.2)
PSA SERPL-MCNC: 0.2 NG/ML (ref 0–4)
RBC # BLD AUTO: 3.58 MILLION/UL (ref 3.88–5.62)
SODIUM SERPL-SCNC: 132 MMOL/L (ref 136–145)
TRIGL SERPL-MCNC: 70 MG/DL
WBC # BLD AUTO: 24.17 THOUSAND/UL (ref 4.31–10.16)

## 2019-02-18 PROCEDURE — 82728 ASSAY OF FERRITIN: CPT

## 2019-02-18 PROCEDURE — 80053 COMPREHEN METABOLIC PANEL: CPT

## 2019-02-18 PROCEDURE — 36415 COLL VENOUS BLD VENIPUNCTURE: CPT

## 2019-02-18 PROCEDURE — 80061 LIPID PANEL: CPT

## 2019-02-18 PROCEDURE — G0103 PSA SCREENING: HCPCS

## 2019-02-18 PROCEDURE — 85025 COMPLETE CBC W/AUTO DIFF WBC: CPT

## 2019-02-18 PROCEDURE — 83615 LACTATE (LD) (LDH) ENZYME: CPT

## 2019-02-25 ENCOUNTER — OFFICE VISIT (OUTPATIENT)
Dept: HEMATOLOGY ONCOLOGY | Facility: CLINIC | Age: 69
End: 2019-02-25
Payer: MEDICARE

## 2019-02-25 VITALS
BODY MASS INDEX: 30.39 KG/M2 | TEMPERATURE: 96.6 F | OXYGEN SATURATION: 97 % | SYSTOLIC BLOOD PRESSURE: 142 MMHG | HEART RATE: 62 BPM | WEIGHT: 193.6 LBS | RESPIRATION RATE: 18 BRPM | DIASTOLIC BLOOD PRESSURE: 70 MMHG | HEIGHT: 67 IN

## 2019-02-25 DIAGNOSIS — D50.9 IRON DEFICIENCY ANEMIA, UNSPECIFIED IRON DEFICIENCY ANEMIA TYPE: ICD-10-CM

## 2019-02-25 DIAGNOSIS — C91.10 CLL (CHRONIC LYMPHOCYTIC LEUKEMIA) (HCC): Primary | Chronic | ICD-10-CM

## 2019-02-25 PROCEDURE — 99214 OFFICE O/P EST MOD 30 MIN: CPT | Performed by: INTERNAL MEDICINE

## 2019-02-25 NOTE — PROGRESS NOTES
Hematology / Oncology Outpatient Follow Up Note    Helene Vyas 79 y o  male AYQ:[id number] EAE:[id number]         Date:  2/25/2019    Assessment / Plan: Khushbu Haro old gentleman with chronic lymphocytic leukemia diagnosed in March 2010  He has lymphocytosis with no lymphadenopathy  He is currently on watchful waiting  Venson Agent previously had iron deficiency anemia which was corrected by IV iron  He remain to be asymptomatic and having stable lymphocytosis  However, he has progressive anemia with low ferritin  This is consistent with iron deficiency anemia, again  He has significant constipation with oral iron  I recommended him to have IV iron venofer weekly x5  He is in agreement with my recommendation  I will see him again in 4 months with CBC, CMP and ferritin                                      Subjective:      HPI:             Interval History:  A 74 year old gentleman with chronic lymphocytic leukemia diagnosed in March 2010  He has been on watchful waiting because he has absolutely no symptoms from hematology standpoint  Over the last few years, he had slowly progressive microcytic anemia  He was found to have iron deficiency  He was previously treated with IV iron with improvement of anemia  His last colonoscopy was in October 2018 which was negative  He came in today for routine follow-up  He has absolutely no new complaint  He denied fever, chills or night sweats  His weight is stable  He has no respiratory symptoms  His performance status is normal                                  Objective:      Primary Diagnosis:     Chronic lymphocytic leukemia  Diagnosed in March 2010  CD-5, CD-23 positive  Kappa light chain positive  CD-38 negative   ZAP-70 positive disease   Diagnosed in 2010     Cancer Staging:  No matching staging information was found for the patient         Previous Hematologic/ Oncologic Treatment:            Current Hematologic/ Oncologic Treatment:       Watchful waiting      Disease Status:      No evidence of progressive disease      Test Results:     Pathology:           Radiology:           Laboratory:     See below, ferritin is only 12  Hemoglobin 9 7      Physical Exam:        General Appearance:    Alert, oriented          Eyes:    PERRL   Ears:    Normal external ear canals, both ears   Nose:   Nares normal, septum midline   Throat:   Mucosa moist  Pharynx without injection  Neck:   Supple         Lungs:     Clear to auscultation bilaterally   Chest Wall:    No tenderness or deformity    Heart:    Regular rate and rhythm         Abdomen:     Soft, non-tender, bowel sounds +, no organomegaly               Extremities:   Extremities no cyanosis or edema         Skin:   no rash or icterus  Lymph nodes:   Cervical, supraclavicular, and axillary nodes normal   Neurologic:   CNII-XII intact, normal strength, sensation and reflexes     Throughout             ROS: Review of Systems   All other systems reviewed and are negative  Imaging: No results found        Labs:   Lab Results   Component Value Date    WBC 24 17 (H) 02/18/2019    HGB 9 7 (L) 02/18/2019    HCT 30 9 (L) 02/18/2019    MCV 86 02/18/2019     02/18/2019     Lab Results   Component Value Date     (L) 07/20/2015    K 4 0 02/18/2019     02/18/2019    CO2 27 02/18/2019    ANIONGAP 9 07/20/2015    BUN 7 02/18/2019    CREATININE 0 86 02/18/2019    GLUCOSE 119 07/20/2015    GLUF 105 (H) 02/18/2019    CALCIUM 8 6 02/18/2019    AST 17 02/18/2019    ALT 24 02/18/2019    ALKPHOS 74 02/18/2019    PROT 8 0 07/20/2015    BILITOT 0 47 07/20/2015    EGFR 90 02/18/2019         Lab Results   Component Value Date     02/18/2019         Lab Results   Component Value Date    PSA 0 2 02/18/2019    PSA 0 1 02/12/2018    PSA 0 2 02/15/2017         Lab Results   Component Value Date    FERRITIN 12 02/18/2019         Lab Results   Component Value Date    FOLATE 11 2 02/23/2017         Current Medications: Reviewed  Allergies: Reviewed  PMH/FH/SH:  Reviewed      Vital Sign:    Body surface area is 1 98 meters squared      Wt Readings from Last 3 Encounters:   02/25/19 87 8 kg (193 lb 9 6 oz)   11/09/18 85 7 kg (189 lb)   10/17/18 88 2 kg (194 lb 6 4 oz)        Temp Readings from Last 3 Encounters:   02/25/19 (!) 96 6 °F (35 9 °C) (Tympanic Core)   10/17/18 99 6 °F (37 6 °C) (Tympanic)   08/15/18 99 6 °F (37 6 °C)        BP Readings from Last 3 Encounters:   02/25/19 142/70   11/09/18 146/86   10/17/18 148/62         Pulse Readings from Last 3 Encounters:   02/25/19 62   11/09/18 66   10/17/18 77     @LASTSAO2(3)@

## 2019-02-27 ENCOUNTER — OFFICE VISIT (OUTPATIENT)
Dept: FAMILY MEDICINE CLINIC | Facility: CLINIC | Age: 69
End: 2019-02-27
Payer: MEDICARE

## 2019-02-27 VITALS
RESPIRATION RATE: 18 BRPM | BODY MASS INDEX: 31.34 KG/M2 | WEIGHT: 195 LBS | SYSTOLIC BLOOD PRESSURE: 130 MMHG | OXYGEN SATURATION: 95 % | DIASTOLIC BLOOD PRESSURE: 68 MMHG | HEART RATE: 64 BPM | HEIGHT: 66 IN

## 2019-02-27 DIAGNOSIS — C91.10 CLL (CHRONIC LYMPHOCYTIC LEUKEMIA) (HCC): Chronic | ICD-10-CM

## 2019-02-27 DIAGNOSIS — Z00.00 MEDICARE ANNUAL WELLNESS VISIT, INITIAL: Primary | ICD-10-CM

## 2019-02-27 DIAGNOSIS — K21.9 GASTROESOPHAGEAL REFLUX DISEASE WITHOUT ESOPHAGITIS: ICD-10-CM

## 2019-02-27 DIAGNOSIS — Z11.59 NEED FOR HEPATITIS C SCREENING TEST: ICD-10-CM

## 2019-02-27 DIAGNOSIS — D50.9 IRON DEFICIENCY ANEMIA, UNSPECIFIED IRON DEFICIENCY ANEMIA TYPE: ICD-10-CM

## 2019-02-27 DIAGNOSIS — I10 ESSENTIAL HYPERTENSION: ICD-10-CM

## 2019-02-27 DIAGNOSIS — I27.20 PULMONARY HYPERTENSION (HCC): ICD-10-CM

## 2019-02-27 PROCEDURE — G0439 PPPS, SUBSEQ VISIT: HCPCS | Performed by: FAMILY MEDICINE

## 2019-02-27 PROCEDURE — 99214 OFFICE O/P EST MOD 30 MIN: CPT | Performed by: FAMILY MEDICINE

## 2019-02-27 NOTE — PROGRESS NOTES
Assessment and Plan:    Problem List Items Addressed This Visit        Digestive    Esophageal reflux       Respiratory    Pulmonary hypertension ([facility] [address] )       Cardiovascular and Mediastinum    Essential hypertension       Other    CLL (chronic lymphocytic leukemia) (HCC) (Chronic)    Iron deficiency anemia      Other Visit Diagnoses     Medicare annual wellness visit, initial    -  Primary    Need for hepatitis C screening test            Health Maintenance Due   Topic Date Due    Hepatitis C Screening  1950    Medicare Annual Wellness Visit (AWV)  1950    BMI: Followup Plan  09/18/1970    Pneumococcal PPSV23/PCV13 65+ Years / High and Highest Risk (2 of 2 - PPSV23) 09/10/2018     Patient presents today for Medicare wellness visit  Overall, he is doing quite well  He recently had a CT of his chest done by his cardiologist to look for pulmonary issues due to his history of pulmonary hypertension  Fortunately, this CT was normal   He does have very mild persistent shortness of breath with significant exertion  He does continue to drink about 3 beers per day  He does not smoke  He continues to complete all of his own activities of daily life  He is up-to-date on prostate cancer screening as well as colonoscopy  He is up-to-date on immunizations  He appears to be safe at home and is not having issues with cognitive decline or depression or anxiety  He has a living will as well as a power of   HPI:  Asiya Mondragon is a 79 y o  male here for his Initial Wellness Visit      Patient Active Problem List   Diagnosis    Essential hypertension    CLL (chronic lymphocytic leukemia) ([facility] [address] )    Primary osteoarthritis of right hip    Allergic rhinitis due to pollen    Erectile dysfunction of non-organic origin    Esophageal reflux    Pulmonary hypertension ([facility] [address] )    Spinal stenosis of lumbar region    Enlarged prostate without lower urinary tract symptoms (luts)    Iron deficiency anemia  Insomnia    Arthritis of right hip    Abnormal EKG    DDD (degenerative disc disease), cervical    DIXIE not currently treated     Past Medical History:   Diagnosis Date    Anxiety     BPH (benign prostatic hypertrophy)     CLL [facility]chronic lymphocytic leukemia) ([facility] [address] )         Concussion     Resolved: 16    Depression     Gastrointestinal hemorrhage     Last assessed: 13    GERD (gastroesophageal reflux disease)     Hearing loss of aging     Hiatal hernia     Hyperlipidemia     Hypertension     Iron deficiency anemia     Microscopic hematuria     Last assessed: 13    OA (osteoarthritis)     right hip    Pneumothorax     Prostatitis     Pulmonary hypertension (HCC)     Seasonal allergies     UTI (urinary tract infection)     in past    Wears glasses      Past Surgical History:   Procedure Laterality Date    COLONOSCOPY      CYSTOSCOPY  10/09/2014    Diagnostic    FRACTURE SURGERY      left lower arm    FRACTURE SURGERY      left femur    HERNIA REPAIR      OTHER SURGICAL HISTORY  2011    Spinal anesthesia epidural    NV TOTAL HIP ARTHROPLASTY Right 2017    Procedure: ARTHROPLASTY HIP TOTAL ANTERIOR;  Surgeon: Saira Vazquez MD;  Location: [facility];  Service: Orthopedics    TONSILLECTOMY AND ADENOIDECTOMY      TRANSURETHRAL RESECTION OF PROSTATE      x 2    WRIST SURGERY       Family History   Problem Relation Age of Onset    No Known Problems Mother     Heart attack Father     Diabetes Brother     Prostate cancer Brother      Social History     Tobacco Use   Smoking Status Former Smoker    Packs/day: 1 00    Years: 15 00    Pack years: 15 00    Types: Cigarettes    Start date:     Last attempt to quit: 1980    Years since quittin 5   Smokeless Tobacco Never Used     Social History     Substance and Sexual Activity   Alcohol Use Yes    Comment: 4 beers day      Social History     Substance and Sexual Activity   Drug Use Yes    Types: Marijuana    Comment: few x month       Current Outpatient Medications   Medication Sig Dispense Refill    amLODIPine (NORVASC) 5 mg tablet Take 1 tablet (5 mg total) by mouth every evening 90 tablet 3    aspirin 81 MG tablet Take 1 tablet by mouth daily      carvedilol (COREG) 12 5 mg tablet Take 1 tablet (12 5 mg total) by mouth 2 (two) times a day with meals 180 tablet 3    fluticasone (FLONASE) 50 mcg/act nasal spray 2 sprays into each nostril daily      gabapentin (NEURONTIN) 300 mg capsule gabapentin 300 mg capsule      ipratropium (ATROVENT) 0 06 % nasal spray 2 sprays into each nostril 4 (four) times a day 45 mL 3    lisinopril (ZESTRIL) 40 mg tablet lisinopril 40 mg tablet      losartan (COZAAR) 100 MG tablet Take 1 tablet (100 mg total) by mouth daily 90 tablet 3    Milk Thistle 1000 MG CAPS Take by mouth      minoxidil (LONITEN) 10 mg tablet TAKE 1 TABLET BY MOUTH ONCE DAILY 90 tablet 3    naproxen (EC NAPROSYN) 500 MG EC tablet Take 1 tablet (500 mg total) by mouth 2 (two) times a day with meals 60 tablet 0    Omega-3 Fatty Acids (FISH OIL) 1,000 mg Take by mouth daily      omeprazole (PriLOSEC) 40 MG capsule Take 1 capsule (40 mg total) by mouth daily 90 capsule 3    senna (SENOKOT) 8 6 mg Take 1 tablet by mouth daily 120 each 0    sertraline (ZOLOFT) 50 mg tablet TAKE ONE TABLET BY MOUTH EVERY DAY 90 tablet 0    simvastatin (ZOCOR) 20 mg tablet TAKE ONE TABLET BY MOUTH AT BEDTIME 90 tablet 3    tamsulosin (FLOMAX) 0 4 mg Take 1 capsule (0 4 mg total) by mouth daily with dinner 90 capsule 3    zolpidem (AMBIEN) 10 mg tablet TAKE ONE TABLET BY MOUTH AT BEDTIME AS NEEDED FOR SLEEP 90 tablet 1    montelukast (SINGULAIR) 10 mg tablet Take 1 tablet (10 mg total) by mouth daily for 180 days 90 tablet 3     No current facility-administered medications for this visit        Allergies   Allergen Reactions    Codeine Other (See Comments)     agitated    Bactrim [Sulfamethoxazole-Trimethoprim] GI Intolerance     Immunization History   Administered Date(s) Administered    INFLUENZA 09/21/2015    Influenza Quadrivalent Preservative Free 3 years and older IM 10/14/2016, 09/15/2017    Influenza Split Preservative Free ID 09/10/2013    Influenza TIV (IM) 08/31/2012, 09/23/2014    Influenza, high dose seasonal 0 5 mL 10/01/2018    Pneumococcal Conjugate 13-Valent 10/14/2016    Pneumococcal Polysaccharide PPV23 09/10/2013    Tdap 08/22/2016    Zoster 04/29/2015, 08/20/2018    Zoster Vaccine Recombinant 08/20/2018, 10/19/2018       Patient Care Team:  Anniece Skiff , MD as PCP - MD Felicita Medley MD    Medicare Screening Tests and Risk Assessments:      Health Risk Assessment:  Patient rates overall health as very good  Patient feels that their physical health rating is Same  Eyesight was rated as Same  Hearing was rated as Same  Patient feels that their emotional and mental health rating is Same  Pain experienced by patient in the last 7 days has been None  Emotional/Mental Health:  Patient has been feeling nervous/anxious  PHQ-9 Depression Screening:    Frequency of the following problems over the past two weeks:      1  Little interest or pleasure in doing things: 0 - not at all      2  Feeling down, depressed, or hopeless: 0 - not at all  PHQ-2 Score: 0          Broken Bones/Falls: Fall Risk Assessment:    In the past year, patient has experienced: No history of falling in past year          Bladder/Bowel:  Patient has not leaked urine accidently in the last six months  Patient reports no loss of bowel control  Immunizations:  Patient has had a flu vaccination within the last year  Patient has received a pneumonia shot  Patient has received a shingles shot  Patient has received tetanus/diphtheria shot   Date of tetanus/diphtheria shot: 8/22/2016    Home Safety:  Patient does not have trouble with stairs inside or outside of their home  Patient currently reports that there are no safety hazards present in home, working smoke alarms, no working carbon monoxide detectors  Preventative Screenings:   prostate cancer screen performed, colon cancer screen completed, cholesterol screen completed, glaucoma eye exam completed,     Nutrition:  Current diet: Regular and Limited junk food with servings of the following:    Medications:  Patient is currently taking over-the-counter supplements  Patient is able to manage medications  Lifestyle Choices:  Patient reports no tobacco use  Patient has not smoked or used tobacco in the past   Patient reports alcohol use  Patient drives a vehicle  Patient wears seat belt  Activities of Daily Living:  Can get out of bed by his or her self, able to dress self, able to make own meals, able to do own shopping, able to bathe self, can do own laundry/housekeeping, can manage own money, pay bills and track expenses    Previous Hospitalizations:  Hospitalization or ED visit in past 12 months  Number of hospitalizations within the last year: 1-2        Advanced Directives:  Patient has decided on a power of   Patient has spoken to designated power of   Patient has completed advanced directive          Preventative Screening/Counseling:      Cardiovascular:      General: Screening Current          Diabetes:      General: Screening Current

## 2019-02-27 NOTE — PROGRESS NOTES
Assessment/Plan:       Problem List Items Addressed This Visit        Digestive    Esophageal reflux     Let us try omeprazole every other day and see how he does            Respiratory    Pulmonary hypertension ([address] )     He remains clinically stable  Recent CT chest was normal             Cardiovascular and Mediastinum    Essential hypertension     Blood pressure is very well controlled  Continue current regimen            Other    CLL (chronic lymphocytic leukemia) (HCC) (Chronic)     Continue close follow-up with Dr Anuradha Walker deficiency anemia     He will be having iron transfusions in the near future  Other Visit Diagnoses     Medicare annual wellness visit, initial    -  Primary    Need for hepatitis C screening test                Subjective:      Patient ID: Yasmany Vasques is a 79 y o  male  HPI patient presents today for follow-up for chronic health issues in addition to Medicare wellness visit  He feels that he is stable  He has some chronic mild exertional shortness of breath but no chest pain  Recent echocardiogram showed id intact ejection fraction of 60%  CT of his lungs was clear by Cardiology who was looking for a potential reason for his pulmonary hypertension  Overall, the patient remains quite stable  He remains on multiple medications for his hypertension but is having no lightheadedness  He has a history of reflux  He gets absolute 0 reflux with omeprazole daily  He is having no dysphagia  He has CLL and denies any unintentional weight loss or night sweats        The following portions of the patient's history were reviewed and updated as appropriate: allergies, current medications, past family history, past medical history, past social history, past surgical history and problem list     Review of Systems      Objective:      /68   Pulse 64   Resp 18   Ht 5' 6 25" (1 683 m)   Wt 88 5 kg (195 lb)   SpO2 95%   BMI 31 24 kg/m²          Physical Exam Adonis Thrasher MD

## 2019-03-05 RX ORDER — SODIUM CHLORIDE 9 MG/ML
20 INJECTION, SOLUTION INTRAVENOUS CONTINUOUS
Status: DISCONTINUED | OUTPATIENT
Start: 2019-03-06 | End: 2019-03-09 | Stop reason: HOSPADM

## 2019-03-06 ENCOUNTER — HOSPITAL ENCOUNTER (OUTPATIENT)
Dept: INFUSION CENTER | Facility: CLINIC | Age: 69
Discharge: HOME | End: 2019-03-06
Payer: MEDICARE

## 2019-03-06 VITALS
SYSTOLIC BLOOD PRESSURE: 162 MMHG | TEMPERATURE: 99.4 F | RESPIRATION RATE: 18 BRPM | DIASTOLIC BLOOD PRESSURE: 80 MMHG | HEART RATE: 64 BPM

## 2019-03-06 PROCEDURE — 96365 THER/PROPH/DIAG IV INF INIT: CPT

## 2019-03-06 RX ADMIN — IRON SUCROSE 200 MG: 20 INJECTION, SOLUTION INTRAVENOUS at 12:03

## 2019-03-06 RX ADMIN — SODIUM CHLORIDE 20 ML/HR: 0.9 INJECTION, SOLUTION INTRAVENOUS at 12:03

## 2019-03-06 NOTE — PROGRESS NOTES
Patient arrived to the unit for his venofer infusion  He had questioned why his infusion was an hour  A clarification that future infusions can be IV push  [place] through his venofer infusion he complained of a pain at his iv site  The site appeared swollen  IV was removed ice was applied  Another attempt was made to place an iv by a different nurse  Patient was upset that his iv's were not successful  He did not want to be stuck for a fourth time  Jeferson Evans RN from Dr Christen Lopez nurse was notified  Patient left without completing his venofer  He will return next week

## 2019-03-06 NOTE — PLAN OF CARE
Problem: INFECTION - ADULT  Goal: Absence or prevention of progression during hospitalization  Description  INTERVENTIONS:  - Assess and monitor for signs and symptoms of infection  - Monitor lab/diagnostic results  - Monitor all insertion sites, i e  indwelling lines, tubes, and drains  - Monitor endotracheal (as able) and nasal secretions for changes in amount and color  - [place] appropriate cooling/warming therapies per order  - Administer medications as ordered  - Instruct and encourage patient and family to use good hand hygiene technique  - Identify and instruct in appropriate isolation precautions for identified infection/condition  Outcome: Progressing

## 2019-03-11 NOTE — PROGRESS NOTES
Pt received venofer per protocol without complications  Observed post-infusion, and discharged in stable condition  Pt aware of next infusion appointment  AVS provided  Pt seen for NPO status. Per chart,  pt with abdominal pain for one week found to have an obstruction with proximal large bowel dilatation. Plan for OR tomorrow for lap/open sigmoidectomy. Pt currently NPO with NGT, NGT output 125ml x 24 hours. Pt denies any history of chewing/swallowing difficulty or GI distress at this time. Pt educated on diet advancement, pt declined to have any nutrition-related questions/concerns at this time. Pt made aware RD remains available.

## 2019-03-12 RX ORDER — SODIUM CHLORIDE 9 MG/ML
20 INJECTION, SOLUTION INTRAVENOUS CONTINUOUS
Status: DISCONTINUED | OUTPATIENT
Start: 2019-03-13 | End: 2019-03-13 | Stop reason: ALTCHOICE

## 2019-03-13 ENCOUNTER — HOSPITAL ENCOUNTER (OUTPATIENT)
Dept: INFUSION CENTER | Facility: CLINIC | Age: 69
Discharge: HOME | End: 2019-03-13

## 2019-03-13 RX ORDER — SODIUM CHLORIDE 9 MG/ML
20 INJECTION, SOLUTION INTRAVENOUS CONTINUOUS
Status: DISCONTINUED | OUTPATIENT
Start: 2019-03-14 | End: 2019-03-17 | Stop reason: HOSPADM

## 2019-03-14 ENCOUNTER — HOSPITAL ENCOUNTER (OUTPATIENT)
Dept: INFUSION CENTER | Facility: CLINIC | Age: 69
Discharge: HOME | End: 2019-03-14
Payer: MEDICARE

## 2019-03-14 PROCEDURE — 96374 THER/PROPH/DIAG INJ IV PUSH: CPT

## 2019-03-14 RX ADMIN — IRON SUCROSE 200 MG: 20 INJECTION, SOLUTION INTRAVENOUS at 10:55

## 2019-03-14 RX ADMIN — SODIUM CHLORIDE 20 ML/HR: 0.9 INJECTION, SOLUTION INTRAVENOUS at 10:55

## 2019-03-14 NOTE — PLAN OF CARE
Problem: INFECTION - ADULT  Goal: Absence or prevention of progression during hospitalization  Description  INTERVENTIONS:  - Assess and monitor for signs and symptoms of infection  - Monitor lab/diagnostic results  - Monitor all insertion sites, i e  indwelling lines, tubes, and drains  - Monitor endotracheal (as able) and nasal secretions for changes in amount and color  - [place] appropriate cooling/warming therapies per order  - Administer medications as ordered  - Instruct and encourage patient and family to use good hand hygiene technique  - Identify and instruct in appropriate isolation precautions for identified infection/condition  Outcome: Progressing

## 2019-03-14 NOTE — PROGRESS NOTES
Patient tolerated his venofer IV push well without adverse reaction  He remained for 20 minutes of observation without complications

## 2019-03-15 ENCOUNTER — OFFICE VISIT (OUTPATIENT)
Dept: FAMILY MEDICINE CLINIC | Facility: CLINIC | Age: 69
End: 2019-03-15
Payer: MEDICARE

## 2019-03-15 VITALS
WEIGHT: 193 LBS | SYSTOLIC BLOOD PRESSURE: 112 MMHG | HEIGHT: 66 IN | DIASTOLIC BLOOD PRESSURE: 78 MMHG | HEART RATE: 92 BPM | BODY MASS INDEX: 31.02 KG/M2 | RESPIRATION RATE: 18 BRPM | OXYGEN SATURATION: 96 %

## 2019-03-15 DIAGNOSIS — I10 ESSENTIAL HYPERTENSION: ICD-10-CM

## 2019-03-15 DIAGNOSIS — S39.011A STRAIN OF ABDOMINAL MUSCLE, INITIAL ENCOUNTER: Primary | ICD-10-CM

## 2019-03-15 DIAGNOSIS — F32.A DEPRESSION, UNSPECIFIED DEPRESSION TYPE: ICD-10-CM

## 2019-03-15 DIAGNOSIS — R73.09 ELEVATED GLUCOSE: ICD-10-CM

## 2019-03-15 PROCEDURE — 99213 OFFICE O/P EST LOW 20 MIN: CPT | Performed by: FAMILY MEDICINE

## 2019-03-15 RX ORDER — CARVEDILOL 12.5 MG/1
12.5 TABLET ORAL 2 TIMES DAILY WITH MEALS
Qty: 180 TABLET | Refills: 3 | Status: SHIPPED | OUTPATIENT
Start: 2019-03-15 | End: 2020-03-06

## 2019-03-15 RX ORDER — LOSARTAN POTASSIUM 100 MG/1
100 TABLET ORAL DAILY
Qty: 90 TABLET | Refills: 3 | Status: SHIPPED | OUTPATIENT
Start: 2019-03-15 | End: 2020-03-06

## 2019-03-15 NOTE — PROGRESS NOTES
Assessment/Plan:       Problem List Items Addressed This Visit        Cardiovascular and Mediastinum    Essential hypertension    Relevant Medications    losartan (COZAAR) 100 MG tablet    carvedilol (COREG) 12 5 mg tablet      Other Visit Diagnoses     Strain of abdominal muscle, initial encounter    -  Primary    Likely due to coughing  Contact me if persisting or worsening and I will check a right upper quadrant ultrasound  Depression, unspecified depression type        Relevant Medications    sertraline (ZOLOFT) 50 mg tablet    Elevated glucose        Relevant Orders    Hemoglobin A1C            Subjective:      Patient ID: Asiya Mondragon is a 79 y o  male  HPI patient presents today complaining of pain in his right upper quadrant  He notes that occurred abruptly yesterday after he was coughing significantly  He has had no nausea or vomiting  He denies fever or chills  He does have a chronic intermittent cough due to his allergies which remained stable  He has not noted any exacerbating factors besides coughing  He has had    The following portions of the patient's history were reviewed and updated as appropriate: allergies, current medications, past family history, past medical history, past social history, past surgical history and problem list     Review of Systems   Constitutional: Negative for appetite change, chills, fatigue, fever and unexpected weight change  HENT: Negative for trouble swallowing  Eyes: Negative for visual disturbance  Respiratory: Negative for cough, chest tightness, shortness of breath and wheezing  Cardiovascular: Negative for chest pain  Gastrointestinal: Negative for abdominal distention, abdominal pain, blood in stool, constipation and diarrhea  Endocrine: Negative for polyuria  Genitourinary: Negative for difficulty urinating and flank pain  Musculoskeletal: Negative for arthralgias and myalgias  Skin: Negative for rash     Neurological: Negative for dizziness and light-headedness  Hematological: Negative for adenopathy  Does not bruise/bleed easily  Psychiatric/Behavioral: Negative for sleep disturbance  Objective:      /78   Pulse 92   Resp 18   Ht 5' 6 25" (1 683 m)   Wt 87 5 kg (193 lb)   SpO2 96%   BMI 30 92 kg/m²          Physical Exam   Constitutional: He is oriented to person, place, and time  He appears well-developed and well-nourished  No distress  HENT:   Head: Normocephalic  Eyes: Pupils are equal, round, and reactive to light  Right eye exhibits no discharge  Left eye exhibits no discharge  Neck: No tracheal deviation present  No thyromegaly present  Cardiovascular: Normal rate, regular rhythm and normal heart sounds  No murmur heard  Pulmonary/Chest: Effort normal  No respiratory distress  He has no wheezes  He has no rales  Abdominal: Soft  He exhibits no distension  There is no tenderness  Musculoskeletal: Normal range of motion  He exhibits no edema  Lymphadenopathy:     He has no cervical adenopathy  Neurological: He is alert and oriented to person, place, and time  No cranial nerve deficit  Skin: Skin is warm  He is not diaphoretic  No erythema  Psychiatric: He has a normal mood and affect   Judgment and thought content normal          Franco Lion MD

## 2019-03-19 RX ORDER — SODIUM CHLORIDE 9 MG/ML
20 INJECTION, SOLUTION INTRAVENOUS CONTINUOUS
Status: DISCONTINUED | OUTPATIENT
Start: 2019-03-20 | End: 2019-03-23 | Stop reason: HOSPADM

## 2019-03-20 ENCOUNTER — HOSPITAL ENCOUNTER (OUTPATIENT)
Dept: INFUSION CENTER | Facility: CLINIC | Age: 69
Discharge: HOME | End: 2019-03-20
Payer: MEDICARE

## 2019-03-20 VITALS
HEART RATE: 67 BPM | RESPIRATION RATE: 18 BRPM | DIASTOLIC BLOOD PRESSURE: 75 MMHG | SYSTOLIC BLOOD PRESSURE: 149 MMHG | TEMPERATURE: 98.2 F

## 2019-03-20 PROCEDURE — 96374 THER/PROPH/DIAG INJ IV PUSH: CPT

## 2019-03-20 RX ADMIN — IRON SUCROSE 200 MG: 20 INJECTION, SOLUTION INTRAVENOUS at 13:11

## 2019-03-20 RX ADMIN — SODIUM CHLORIDE 20 ML/HR: 0.9 INJECTION, SOLUTION INTRAVENOUS at 13:11

## 2019-03-20 NOTE — PLAN OF CARE
Problem: Potential for Falls  Goal: Patient will remain free of falls  Description  INTERVENTIONS:  - Assess patient frequently for physical needs  -  Identify cognitive and physical deficits and behaviors that affect risk of falls    -  [place] fall precautions as indicated by assessment   - Educate patient/family on patient safety including physical limitations  - Instruct patient to call for assistance with activity based on assessment  - Modify environment to reduce risk of injury  - Consider OT/PT consult to assist with strengthening/mobility  Outcome: Progressing

## 2019-03-26 RX ORDER — SODIUM CHLORIDE 9 MG/ML
20 INJECTION, SOLUTION INTRAVENOUS CONTINUOUS
Status: DISCONTINUED | OUTPATIENT
Start: 2019-03-27 | End: 2019-03-30 | Stop reason: HOSPADM

## 2019-03-27 ENCOUNTER — HOSPITAL ENCOUNTER (OUTPATIENT)
Dept: INFUSION CENTER | Facility: CLINIC | Age: 69
Discharge: HOME | End: 2019-03-27
Payer: MEDICARE

## 2019-03-27 VITALS
HEART RATE: 56 BPM | TEMPERATURE: 99 F | SYSTOLIC BLOOD PRESSURE: 143 MMHG | DIASTOLIC BLOOD PRESSURE: 72 MMHG | RESPIRATION RATE: 18 BRPM

## 2019-03-27 PROCEDURE — 96374 THER/PROPH/DIAG INJ IV PUSH: CPT

## 2019-03-27 RX ADMIN — SODIUM CHLORIDE 20 ML/HR: 0.9 INJECTION, SOLUTION INTRAVENOUS at 12:06

## 2019-03-27 RX ADMIN — IRON SUCROSE 200 MG: 20 INJECTION, SOLUTION INTRAVENOUS at 12:06

## 2019-03-27 NOTE — PLAN OF CARE
Problem: INFECTION - ADULT  Goal: Absence or prevention of progression during hospitalization  Description  INTERVENTIONS:  - Assess and monitor for signs and symptoms of infection  - Monitor lab/diagnostic results  - Monitor all insertion sites, i e  indwelling lines, tubes, and drains  - Monitor endotracheal (as able) and nasal secretions for changes in amount and color  - [place] appropriate cooling/warming therapies per order  - Administer medications as ordered  - Instruct and encourage patient and family to use good hand hygiene technique  - Identify and instruct in appropriate isolation precautions for identified infection/condition  Outcome: Progressing

## 2019-04-02 RX ORDER — SODIUM CHLORIDE 9 MG/ML
20 INJECTION, SOLUTION INTRAVENOUS CONTINUOUS
Status: DISCONTINUED | OUTPATIENT
Start: 2019-04-03 | End: 2019-04-06 | Stop reason: HOSPADM

## 2019-04-03 ENCOUNTER — HOSPITAL ENCOUNTER (OUTPATIENT)
Dept: INFUSION CENTER | Facility: CLINIC | Age: 69
Discharge: HOME | End: 2019-04-03
Payer: MEDICARE

## 2019-04-03 ENCOUNTER — HOSPITAL ENCOUNTER (OUTPATIENT)
Dept: NON INVASIVE DIAGNOSTICS | Facility: CLINIC | Age: 69
Discharge: HOME | End: 2019-04-03
Payer: MEDICARE

## 2019-04-03 VITALS
DIASTOLIC BLOOD PRESSURE: 76 MMHG | SYSTOLIC BLOOD PRESSURE: 141 MMHG | HEART RATE: 59 BPM | TEMPERATURE: 99.2 F | RESPIRATION RATE: 18 BRPM

## 2019-04-03 DIAGNOSIS — I27.20 PULMONARY HYPERTENSION (HCC): ICD-10-CM

## 2019-04-03 PROCEDURE — 93308 TTE F-UP OR LMTD: CPT | Performed by: INTERNAL MEDICINE

## 2019-04-03 PROCEDURE — 93325 DOPPLER ECHO COLOR FLOW MAPG: CPT | Performed by: INTERNAL MEDICINE

## 2019-04-03 PROCEDURE — 96374 THER/PROPH/DIAG INJ IV PUSH: CPT

## 2019-04-03 PROCEDURE — 93308 TTE F-UP OR LMTD: CPT

## 2019-04-03 PROCEDURE — 93321 DOPPLER ECHO F-UP/LMTD STD: CPT | Performed by: INTERNAL MEDICINE

## 2019-04-03 RX ADMIN — IRON SUCROSE 200 MG: 20 INJECTION, SOLUTION INTRAVENOUS at 11:15

## 2019-04-03 RX ADMIN — SODIUM CHLORIDE 20 ML/HR: 0.9 INJECTION, SOLUTION INTRAVENOUS at 11:15

## 2019-04-03 NOTE — PROGRESS NOTES
Patient tolerated Venofer IVP well  Offers no complaints  Pt is aware he has follow up visit with Dr Bradley Willis in June   Refused AVS

## 2019-04-03 NOTE — PLAN OF CARE
Problem: Potential for Falls  Goal: Patient will remain free of falls  Description  INTERVENTIONS:  - Assess patient frequently for physical needs  -  Identify cognitive and physical deficits and behaviors that affect risk of falls    -  [place] fall precautions as indicated by assessment   - Educate patient/family on patient safety including physical limitations  - Instruct patient to call for assistance with activity based on assessment  - Modify environment to reduce risk of injury  - Consider OT/PT consult to assist with strengthening/mobility  Outcome: Progressing

## 2019-04-10 ENCOUNTER — OFFICE VISIT (OUTPATIENT)
Dept: PULMONOLOGY | Facility: CLINIC | Age: 69
End: 2019-04-10
Payer: MEDICARE

## 2019-04-10 VITALS
TEMPERATURE: 98.9 F | WEIGHT: 196.6 LBS | BODY MASS INDEX: 29.8 KG/M2 | HEART RATE: 56 BPM | OXYGEN SATURATION: 94 % | RESPIRATION RATE: 18 BRPM | HEIGHT: 68 IN | DIASTOLIC BLOOD PRESSURE: 70 MMHG | SYSTOLIC BLOOD PRESSURE: 148 MMHG

## 2019-04-10 DIAGNOSIS — I27.20 PULMONARY HYPERTENSION (HCC): Primary | ICD-10-CM

## 2019-04-10 PROCEDURE — 99214 OFFICE O/P EST MOD 30 MIN: CPT | Performed by: INTERNAL MEDICINE

## 2019-05-06 ENCOUNTER — TELEPHONE (OUTPATIENT)
Dept: FAMILY MEDICINE CLINIC | Facility: CLINIC | Age: 69
End: 2019-05-06

## 2019-06-18 ENCOUNTER — TRANSCRIBE ORDERS (OUTPATIENT)
Dept: LAB | Facility: MEDICAL CENTER | Age: 69
End: 2019-06-18

## 2019-06-19 ENCOUNTER — OFFICE VISIT (OUTPATIENT)
Dept: FAMILY MEDICINE CLINIC | Facility: CLINIC | Age: 69
End: 2019-06-19
Payer: MEDICARE

## 2019-06-19 VITALS
HEIGHT: 66 IN | SYSTOLIC BLOOD PRESSURE: 130 MMHG | DIASTOLIC BLOOD PRESSURE: 70 MMHG | BODY MASS INDEX: 31.08 KG/M2 | TEMPERATURE: 99.9 F | OXYGEN SATURATION: 97 % | WEIGHT: 193.4 LBS | HEART RATE: 60 BPM

## 2019-06-19 DIAGNOSIS — I27.20 PULMONARY HYPERTENSION (HCC): ICD-10-CM

## 2019-06-19 DIAGNOSIS — K21.9 GASTROESOPHAGEAL REFLUX DISEASE, ESOPHAGITIS PRESENCE NOT SPECIFIED: ICD-10-CM

## 2019-06-19 DIAGNOSIS — H61.22 HEARING LOSS SECONDARY TO CERUMEN IMPACTION, LEFT: Primary | ICD-10-CM

## 2019-06-19 PROCEDURE — 1124F ACP DISCUSS-NO DSCNMKR DOCD: CPT | Performed by: FAMILY MEDICINE

## 2019-06-19 PROCEDURE — 69210 REMOVE IMPACTED EAR WAX UNI: CPT | Performed by: FAMILY MEDICINE

## 2019-06-19 PROCEDURE — 99213 OFFICE O/P EST LOW 20 MIN: CPT | Performed by: FAMILY MEDICINE

## 2019-06-19 RX ORDER — AMLODIPINE BESYLATE 5 MG/1
5 TABLET ORAL EVERY EVENING
Qty: 90 TABLET | Refills: 3 | Status: SHIPPED | OUTPATIENT
Start: 2019-06-19 | End: 2020-08-03

## 2019-06-19 RX ORDER — OMEPRAZOLE 40 MG/1
40 CAPSULE, DELAYED RELEASE ORAL DAILY
Qty: 90 CAPSULE | Refills: 3 | Status: SHIPPED | OUTPATIENT
Start: 2019-06-19 | End: 2020-08-19

## 2019-06-21 ENCOUNTER — LAB (OUTPATIENT)
Dept: LAB | Facility: MEDICAL CENTER | Age: 69
End: 2019-06-21
Payer: MEDICARE

## 2019-06-21 DIAGNOSIS — I10 ESSENTIAL HYPERTENSION: ICD-10-CM

## 2019-06-21 DIAGNOSIS — C91.10 CLL (CHRONIC LYMPHOCYTIC LEUKEMIA) (HCC): ICD-10-CM

## 2019-06-21 DIAGNOSIS — Z11.59 NEED FOR HEPATITIS C SCREENING TEST: ICD-10-CM

## 2019-06-21 DIAGNOSIS — R73.09 ELEVATED GLUCOSE: ICD-10-CM

## 2019-06-21 LAB
ALBUMIN SERPL BCP-MCNC: 4.5 G/DL (ref 3.5–5)
ALP SERPL-CCNC: 84 U/L (ref 46–116)
ALT SERPL W P-5'-P-CCNC: 33 U/L (ref 12–78)
ANION GAP SERPL CALCULATED.3IONS-SCNC: 4 MMOL/L (ref 4–13)
ANISOCYTOSIS BLD QL SMEAR: PRESENT
AST SERPL W P-5'-P-CCNC: 21 U/L (ref 5–45)
BASOPHILS # BLD MANUAL: 0.2 THOUSAND/UL (ref 0–0.1)
BASOPHILS NFR MAR MANUAL: 1 % (ref 0–1)
BILIRUB SERPL-MCNC: 0.66 MG/DL (ref 0.2–1)
BUN SERPL-MCNC: 9 MG/DL (ref 5–25)
CALCIUM SERPL-MCNC: 9.3 MG/DL (ref 8.3–10.1)
CHLORIDE SERPL-SCNC: 100 MMOL/L (ref 100–108)
CO2 SERPL-SCNC: 30 MMOL/L (ref 21–32)
CREAT SERPL-MCNC: 0.85 MG/DL (ref 0.6–1.3)
EOSINOPHIL # BLD MANUAL: 0.2 THOUSAND/UL (ref 0–0.4)
EOSINOPHIL NFR BLD MANUAL: 1 % (ref 0–6)
ERYTHROCYTE [DISTWIDTH] IN BLOOD BY AUTOMATED COUNT: 14.6 % (ref 11.6–15.1)
EST. AVERAGE GLUCOSE BLD GHB EST-MCNC: 114 MG/DL
FERRITIN SERPL-MCNC: 27 NG/ML (ref 8–388)
GFR SERPL CREATININE-BSD FRML MDRD: 91 ML/MIN/1.73SQ M
GLUCOSE P FAST SERPL-MCNC: 106 MG/DL (ref 65–99)
HBA1C MFR BLD: 5.6 % (ref 4.2–6.3)
HCT VFR BLD AUTO: 39.9 % (ref 36.5–49.3)
HCV AB SER QL: NORMAL
HGB BLD-MCNC: 12.9 G/DL (ref 12–17)
LYMPHOCYTES # BLD AUTO: 16.77 THOUSAND/UL (ref 0.6–4.47)
LYMPHOCYTES # BLD AUTO: 84 % (ref 14–44)
MCH RBC QN AUTO: 30.6 PG (ref 26.8–34.3)
MCHC RBC AUTO-ENTMCNC: 32.3 G/DL (ref 31.4–37.4)
MCV RBC AUTO: 95 FL (ref 82–98)
MICROCYTES BLD QL AUTO: PRESENT
MONOCYTES # BLD AUTO: 0.2 THOUSAND/UL (ref 0–1.22)
MONOCYTES NFR BLD: 1 % (ref 4–12)
NEUTROPHILS # BLD MANUAL: 2.59 THOUSAND/UL (ref 1.85–7.62)
NEUTS SEG NFR BLD AUTO: 13 % (ref 43–75)
NRBC BLD AUTO-RTO: 0 /100 WBCS
OVALOCYTES BLD QL SMEAR: PRESENT
PLATELET # BLD AUTO: 160 THOUSANDS/UL (ref 149–390)
PLATELET BLD QL SMEAR: ADEQUATE
PMV BLD AUTO: 9.9 FL (ref 8.9–12.7)
POIKILOCYTOSIS BLD QL SMEAR: PRESENT
POTASSIUM SERPL-SCNC: 4.4 MMOL/L (ref 3.5–5.3)
PROT SERPL-MCNC: 8.8 G/DL (ref 6.4–8.2)
RBC # BLD AUTO: 4.22 MILLION/UL (ref 3.88–5.62)
RBC MORPH BLD: PRESENT
SODIUM SERPL-SCNC: 134 MMOL/L (ref 136–145)
STOMATOCYTES BLD QL SMEAR: PRESENT
WBC # BLD AUTO: 19.96 THOUSAND/UL (ref 4.31–10.16)

## 2019-06-21 PROCEDURE — 80053 COMPREHEN METABOLIC PANEL: CPT

## 2019-06-21 PROCEDURE — 82728 ASSAY OF FERRITIN: CPT

## 2019-06-21 PROCEDURE — 85027 COMPLETE CBC AUTOMATED: CPT

## 2019-06-21 PROCEDURE — 85007 BL SMEAR W/DIFF WBC COUNT: CPT

## 2019-06-21 PROCEDURE — 83036 HEMOGLOBIN GLYCOSYLATED A1C: CPT

## 2019-06-21 PROCEDURE — 86803 HEPATITIS C AB TEST: CPT

## 2019-06-21 PROCEDURE — 36415 COLL VENOUS BLD VENIPUNCTURE: CPT

## 2019-06-27 ENCOUNTER — TELEPHONE (OUTPATIENT)
Dept: GASTROENTEROLOGY | Facility: MEDICAL CENTER | Age: 69
End: 2019-06-27

## 2019-06-27 ENCOUNTER — OFFICE VISIT (OUTPATIENT)
Dept: HEMATOLOGY ONCOLOGY | Facility: CLINIC | Age: 69
End: 2019-06-27
Payer: MEDICARE

## 2019-06-27 VITALS
DIASTOLIC BLOOD PRESSURE: 80 MMHG | HEIGHT: 66 IN | RESPIRATION RATE: 18 BRPM | WEIGHT: 197 LBS | TEMPERATURE: 98.8 F | SYSTOLIC BLOOD PRESSURE: 164 MMHG | HEART RATE: 67 BPM | OXYGEN SATURATION: 94 % | BODY MASS INDEX: 31.66 KG/M2

## 2019-06-27 DIAGNOSIS — D50.9 IRON DEFICIENCY ANEMIA, UNSPECIFIED IRON DEFICIENCY ANEMIA TYPE: ICD-10-CM

## 2019-06-27 DIAGNOSIS — C91.10 CLL (CHRONIC LYMPHOCYTIC LEUKEMIA) (HCC): Primary | Chronic | ICD-10-CM

## 2019-06-27 PROCEDURE — 99214 OFFICE O/P EST MOD 30 MIN: CPT | Performed by: INTERNAL MEDICINE

## 2019-06-27 RX ORDER — SODIUM CHLORIDE 9 MG/ML
20 INJECTION, SOLUTION INTRAVENOUS ONCE
Status: CANCELLED | OUTPATIENT
Start: 2019-07-10

## 2019-06-27 NOTE — TELEPHONE ENCOUNTER
Please call patient for first available appt in [place]  Referred by Dr Karie Hernandez for chronic lymphocytic leukemia   Patient can be reached at [phone]

## 2019-07-01 ENCOUNTER — TELEPHONE (OUTPATIENT)
Dept: FAMILY MEDICINE CLINIC | Facility: CLINIC | Age: 69
End: 2019-07-01

## 2019-07-01 NOTE — TELEPHONE ENCOUNTER
He was told by Dr Carrie Holter that he needs a sigmoidoscopy  Fatmata Mari He wants to know who you would go to for this  And don't tell him they're all good , b/c he doesn't want to hear that

## 2019-07-10 ENCOUNTER — HOSPITAL ENCOUNTER (OUTPATIENT)
Dept: INFUSION CENTER | Facility: CLINIC | Age: 69
Discharge: HOME | End: 2019-07-10
Payer: MEDICARE

## 2019-07-10 VITALS
HEART RATE: 62 BPM | TEMPERATURE: 98.5 F | RESPIRATION RATE: 20 BRPM | SYSTOLIC BLOOD PRESSURE: 133 MMHG | DIASTOLIC BLOOD PRESSURE: 71 MMHG

## 2019-07-10 DIAGNOSIS — D50.9 IRON DEFICIENCY ANEMIA, UNSPECIFIED IRON DEFICIENCY ANEMIA TYPE: Primary | ICD-10-CM

## 2019-07-10 PROCEDURE — 96374 THER/PROPH/DIAG INJ IV PUSH: CPT

## 2019-07-10 RX ORDER — SODIUM CHLORIDE 9 MG/ML
20 INJECTION, SOLUTION INTRAVENOUS ONCE
Status: CANCELLED | OUTPATIENT
Start: 2019-12-25

## 2019-07-10 RX ORDER — SODIUM CHLORIDE 9 MG/ML
20 INJECTION, SOLUTION INTRAVENOUS ONCE
Status: COMPLETED | OUTPATIENT
Start: 2019-07-10 | End: 2019-07-10

## 2019-07-10 RX ADMIN — IRON SUCROSE 200 MG: 20 INJECTION, SOLUTION INTRAVENOUS at 12:01

## 2019-07-10 RX ADMIN — SODIUM CHLORIDE 20 ML/HR: 0.9 INJECTION, SOLUTION INTRAVENOUS at 11:40

## 2019-07-10 NOTE — PLAN OF CARE
Problem: Potential for Falls  Goal: Patient will remain free of falls  Description  INTERVENTIONS:  - Assess patient frequently for physical needs  -  Identify cognitive and physical deficits and behaviors that affect risk of falls    -  [place] fall precautions as indicated by assessment   - Educate patient/family on patient safety including physical limitations  - Instruct patient to call for assistance with activity based on assessment  - Modify environment to reduce risk of injury  - Consider OT/PT consult to assist with strengthening/mobility  Outcome: Progressing

## 2019-07-15 DIAGNOSIS — J30.1 ALLERGIC RHINITIS DUE TO POLLEN, UNSPECIFIED SEASONALITY: Primary | ICD-10-CM

## 2019-07-16 RX ORDER — FLUTICASONE PROPIONATE 50 MCG
2 SPRAY, SUSPENSION (ML) NASAL DAILY PRN
Qty: 3 BOTTLE | Refills: 3 | Status: SHIPPED | OUTPATIENT
Start: 2019-07-16 | End: 2021-02-26 | Stop reason: ALTCHOICE

## 2019-08-01 DIAGNOSIS — N40.0 ENLARGED PROSTATE WITHOUT LOWER URINARY TRACT SYMPTOMS (LUTS): ICD-10-CM

## 2019-08-01 RX ORDER — TAMSULOSIN HYDROCHLORIDE 0.4 MG/1
CAPSULE ORAL
Qty: 90 CAPSULE | Refills: 3 | Status: SHIPPED | OUTPATIENT
Start: 2019-08-01 | End: 2020-01-30

## 2019-08-12 DIAGNOSIS — F51.01 PRIMARY INSOMNIA: ICD-10-CM

## 2019-08-12 RX ORDER — ZOLPIDEM TARTRATE 10 MG/1
TABLET ORAL
Qty: 90 TABLET | Refills: 0 | Status: SHIPPED | OUTPATIENT
Start: 2019-08-12 | End: 2020-04-30

## 2019-08-30 ENCOUNTER — OFFICE VISIT (OUTPATIENT)
Dept: FAMILY MEDICINE CLINIC | Facility: CLINIC | Age: 69
End: 2019-08-30
Payer: MEDICARE

## 2019-08-30 VITALS
TEMPERATURE: 98.4 F | SYSTOLIC BLOOD PRESSURE: 120 MMHG | OXYGEN SATURATION: 95 % | DIASTOLIC BLOOD PRESSURE: 72 MMHG | WEIGHT: 191.8 LBS | BODY MASS INDEX: 30.96 KG/M2 | HEART RATE: 64 BPM

## 2019-08-30 DIAGNOSIS — I10 ESSENTIAL HYPERTENSION: ICD-10-CM

## 2019-08-30 DIAGNOSIS — E78.00 HYPERCHOLESTEROLEMIA: ICD-10-CM

## 2019-08-30 DIAGNOSIS — C91.10 CLL (CHRONIC LYMPHOCYTIC LEUKEMIA) (HCC): Chronic | ICD-10-CM

## 2019-08-30 DIAGNOSIS — F32.A MILD DEPRESSION: ICD-10-CM

## 2019-08-30 DIAGNOSIS — N40.0 ENLARGED PROSTATE WITHOUT LOWER URINARY TRACT SYMPTOMS (LUTS): ICD-10-CM

## 2019-08-30 DIAGNOSIS — J30.1 ALLERGIC RHINITIS DUE TO POLLEN, UNSPECIFIED SEASONALITY: ICD-10-CM

## 2019-08-30 DIAGNOSIS — K21.9 GASTROESOPHAGEAL REFLUX DISEASE WITHOUT ESOPHAGITIS: ICD-10-CM

## 2019-08-30 DIAGNOSIS — F51.01 PRIMARY INSOMNIA: ICD-10-CM

## 2019-08-30 DIAGNOSIS — Z23 NEED FOR PNEUMOCOCCAL VACCINATION: Primary | ICD-10-CM

## 2019-08-30 DIAGNOSIS — I27.20 PULMONARY HYPERTENSION (HCC): ICD-10-CM

## 2019-08-30 PROBLEM — H61.22: Status: RESOLVED | Noted: 2019-06-19 | Resolved: 2019-08-30

## 2019-08-30 PROCEDURE — 99214 OFFICE O/P EST MOD 30 MIN: CPT | Performed by: FAMILY MEDICINE

## 2019-08-30 PROCEDURE — 90732 PPSV23 VACC 2 YRS+ SUBQ/IM: CPT

## 2019-08-30 PROCEDURE — G0009 ADMIN PNEUMOCOCCAL VACCINE: HCPCS

## 2019-08-30 RX ORDER — ATORVASTATIN CALCIUM 20 MG/1
20 TABLET, FILM COATED ORAL DAILY
Qty: 90 TABLET | Refills: 3 | Status: SHIPPED | OUTPATIENT
Start: 2019-08-30 | End: 2020-08-19

## 2019-08-30 RX ORDER — ATORVASTATIN CALCIUM 20 MG/1
20 TABLET, FILM COATED ORAL DAILY
Qty: 90 TABLET | Refills: 3 | Status: SHIPPED | OUTPATIENT
Start: 2019-08-30 | End: 2019-08-30 | Stop reason: SDUPTHER

## 2019-08-30 NOTE — ASSESSMENT & PLAN NOTE
I am going to switch him over to atorvastatin as he remains on amlodipine  He has done quite well on the combination of simvastatin amlodipine, but there is a listed interaction

## 2019-08-30 NOTE — ASSESSMENT & PLAN NOTE
Depression is certainly stable  We talked about potentially bumping up his sertraline to 100 mg daily as he is having some breakthrough anxiety  In light of his chronic alcohol use, I am uncomfortable with benzodiazepines

## 2019-08-30 NOTE — ASSESSMENT & PLAN NOTE
He can continue on Singulair and antihistamines  He is having some significant allergic symptoms in the morning  I offered allergy evaluation, but he would like to hold off on that for now

## 2019-08-30 NOTE — PROGRESS NOTES
Assessment/Plan:       Problem List Items Addressed This Visit        Digestive    Esophageal reflux     He does well on omeprazole  He will be having an EGD in the near future due to his persistent iron deficiency anemia  He tried to titrate down his omeprazole to every other day but he had significant recurrence of reflux  Continue with daily omeprazole  Respiratory    Allergic rhinitis due to pollen     He can continue on Singulair and antihistamines  He is having some significant allergic symptoms in the morning  I offered allergy evaluation, but he would like to hold off on that for now  Pulmonary hypertension ([address] )     He remains asymptomatic  He declines an offer for a sleep study as he notes he would not use a CPAP machine  Continue with routine pulmonary follow-up            Cardiovascular and Mediastinum    Essential hypertension - Primary     Blood pressure remains well controlled  Continue current regimen  Other    CLL (chronic lymphocytic leukemia) (HCC) (Chronic)     Continue routine Oncology follow-up         Enlarged prostate without lower urinary tract symptoms (luts)     Symptoms are stable  Insomnia    Hypercholesterolemia     I am going to switch him over to atorvastatin as he remains on amlodipine  He has done quite well on the combination of simvastatin amlodipine, but there is a listed interaction  Relevant Medications    atorvastatin (LIPITOR) 20 mg tablet    Other Relevant Orders    Lipid Panel with Direct LDL reflex          BMI Counseling: Body mass index is 30 96 kg/m²  Discussed the patient's BMI with him  The BMI is above average  BMI counseling and education was provided to the patient  Nutrition recommendations include reducing portion sizes, decreasing overall calorie intake and 3-5 servings of fruits/vegetables daily  Exercise recommendations include moderate aerobic physical activity for 150 minutes/week      Subjective: Patient ID: Darrell Ac is a 79 y o  male  HPI patient presents today for follow-up for chronic health issues  The patient presents today for a hypertension follow-up  Patient remains compliant with medications and denies any chest pain, shortness of breath, palpitations, lightheadedness or diaphoresis  He has history of BPH but is voiding relatively well  His history of some mild depression which he blames mostly on situational circumstances in regards to his wife having history of some chronic brain injury  He does have some breakthrough anxiety intermittently  He has allergies and is currently experiencing some postnasal drip  He feels that Singulair is helpful he takes at bedtime  He has history of iron deficiency anemia which is currently being worked up by Oncology  He also has CLL  He is due for an EGD in the near future  He has known reflux and had significant breakthrough heartburn we tried to titrate down his omeprazole  Currently denies heartburn or dysphagia      The following portions of the patient's history were reviewed and updated as appropriate: allergies, current medications, past family history, past medical history, past social history, past surgical history and problem list       Current Outpatient Medications:     amLODIPine (NORVASC) 5 mg tablet, Take 1 tablet (5 mg total) by mouth every evening, Disp: 90 tablet, Rfl: 3    aspirin 81 MG tablet, Take 1 tablet by mouth daily, Disp: , Rfl:     carvedilol (COREG) 12 5 mg tablet, Take 1 tablet (12 5 mg total) by mouth 2 (two) times a day with meals, Disp: 180 tablet, Rfl: 3    fluticasone (FLONASE) 50 mcg/act nasal spray, 2 sprays into each nostril daily as needed for allergies, Disp: 3 Bottle, Rfl: 3    gabapentin (NEURONTIN) 300 mg capsule, gabapentin 300 mg capsule, Disp: , Rfl:     losartan (COZAAR) 100 MG tablet, Take 1 tablet (100 mg total) by mouth daily, Disp: 90 tablet, Rfl: 3    Milk Thistle 1000 MG CAPS, Take by mouth, Disp: , Rfl:     minoxidil (LONITEN) 10 mg tablet, TAKE 1 TABLET BY MOUTH ONCE DAILY, Disp: 90 tablet, Rfl: 3    montelukast (SINGULAIR) 10 mg tablet, Take 1 tablet (10 mg total) by mouth daily for 180 days (Patient taking differently: Take 10 mg by mouth daily as needed ), Disp: 90 tablet, Rfl: 3    naproxen (EC NAPROSYN) 500 MG EC tablet, Take 1 tablet (500 mg total) by mouth 2 (two) times a day with meals, Disp: 60 tablet, Rfl: 0    Omega-3 Fatty Acids (FISH OIL) 1,000 mg, Take by mouth daily, Disp: , Rfl:     omeprazole (PriLOSEC) 40 MG capsule, Take 1 capsule (40 mg total) by mouth daily, Disp: 90 capsule, Rfl: 3    sertraline (ZOLOFT) 50 mg tablet, Take 1 tablet (50 mg total) by mouth daily, Disp: 90 tablet, Rfl: 3    tamsulosin (FLOMAX) 0 4 mg, TAKE ONE CAPSULE BY MOUTH EVERY DAY WITH DINNER, Disp: 90 capsule, Rfl: 3    zolpidem (AMBIEN) 10 mg tablet, TAKE ONE TABLET BY MOUTH AT BEDTIME AS NEEDED FOR SLEEP, Disp: 90 tablet, Rfl: 0    atorvastatin (LIPITOR) 20 mg tablet, Take 1 tablet (20 mg total) by mouth daily, Disp: 90 tablet, Rfl: 3     Review of Systems   Constitutional: Negative for appetite change, chills, fatigue, fever and unexpected weight change  HENT: Negative for trouble swallowing  Eyes: Negative for visual disturbance  Respiratory: Negative for cough, chest tightness, shortness of breath and wheezing  Cardiovascular: Negative for chest pain  Gastrointestinal: Negative for abdominal distention, abdominal pain, blood in stool, constipation and diarrhea  Endocrine: Negative for polyuria  Genitourinary: Negative for difficulty urinating and flank pain  Musculoskeletal: Negative for arthralgias and myalgias  Skin: Negative for rash  Neurological: Negative for dizziness and light-headedness  Hematological: Negative for adenopathy  Does not bruise/bleed easily  Psychiatric/Behavioral: Negative for sleep disturbance           Objective:      /72 (BP Location: Left arm, Patient Position: Sitting, Cuff Size: Standard)   Pulse 64   Temp 98 4 °F (36 9 °C) (Tympanic)   Wt 87 kg (191 lb 12 8 oz)   SpO2 95%   BMI 30 96 kg/m²          Physical Exam   Constitutional: He is oriented to person, place, and time  He appears well-developed and well-nourished  No distress  HENT:   Head: Normocephalic  Eyes: Pupils are equal, round, and reactive to light  Right eye exhibits no discharge  Left eye exhibits no discharge  Neck: No tracheal deviation present  No thyromegaly present  Cardiovascular: Normal rate, regular rhythm and normal heart sounds  No murmur heard  Pulmonary/Chest: Effort normal  No respiratory distress  He has no wheezes  He has no rales  Abdominal: Soft  He exhibits no distension  There is no tenderness  Musculoskeletal: Normal range of motion  He exhibits no edema  Lymphadenopathy:     He has no cervical adenopathy  Neurological: He is alert and oriented to person, place, and time  No cranial nerve deficit  Skin: Skin is warm  He is not diaphoretic  No erythema  Psychiatric: He has a normal mood and affect   Judgment and thought content normal          Liudmila Stanley MD

## 2019-08-30 NOTE — ASSESSMENT & PLAN NOTE
He does well on omeprazole  He will be having an EGD in the near future due to his persistent iron deficiency anemia  He tried to titrate down his omeprazole to every other day but he had significant recurrence of reflux  Continue with daily omeprazole

## 2019-08-30 NOTE — ASSESSMENT & PLAN NOTE
He remains asymptomatic  He declines an offer for a sleep study as he notes he would not use a CPAP machine    Continue with routine pulmonary follow-up

## 2019-09-16 ENCOUNTER — TELEPHONE (OUTPATIENT)
Dept: GASTROENTEROLOGY | Facility: MEDICAL CENTER | Age: 69
End: 2019-09-16

## 2019-09-16 ENCOUNTER — OFFICE VISIT (OUTPATIENT)
Dept: GASTROENTEROLOGY | Facility: MEDICAL CENTER | Age: 69
End: 2019-09-16
Payer: MEDICARE

## 2019-09-16 VITALS
HEIGHT: 66 IN | HEART RATE: 69 BPM | SYSTOLIC BLOOD PRESSURE: 118 MMHG | TEMPERATURE: 98.6 F | DIASTOLIC BLOOD PRESSURE: 76 MMHG | WEIGHT: 190.2 LBS | BODY MASS INDEX: 30.57 KG/M2

## 2019-09-16 DIAGNOSIS — G47.33 OSA (OBSTRUCTIVE SLEEP APNEA): ICD-10-CM

## 2019-09-16 DIAGNOSIS — I27.20 PULMONARY HYPERTENSION (HCC): ICD-10-CM

## 2019-09-16 DIAGNOSIS — Z86.010 HISTORY OF COLON POLYPS: ICD-10-CM

## 2019-09-16 DIAGNOSIS — K21.9 GASTROESOPHAGEAL REFLUX DISEASE WITHOUT ESOPHAGITIS: Primary | ICD-10-CM

## 2019-09-16 DIAGNOSIS — D50.9 IRON DEFICIENCY ANEMIA, UNSPECIFIED IRON DEFICIENCY ANEMIA TYPE: ICD-10-CM

## 2019-09-16 PROBLEM — Z86.0100 HISTORY OF COLON POLYPS: Status: ACTIVE | Noted: 2019-09-16

## 2019-09-16 PROCEDURE — 99204 OFFICE O/P NEW MOD 45 MIN: CPT | Performed by: INTERNAL MEDICINE

## 2019-09-16 PROCEDURE — 1124F ACP DISCUSS-NO DSCNMKR DOCD: CPT | Performed by: INTERNAL MEDICINE

## 2019-09-16 NOTE — PATIENT INSTRUCTIONS
EGD scheduled on 11/12/2019 with Dr Yokasta Bustos at [facility]  Instructions given to patient

## 2019-09-16 NOTE — TELEPHONE ENCOUNTER
Pt was rescheduled to 10/8/19 with dr Jelani Fields at [facility] end pt requests after 1130 am arrival time

## 2019-09-16 NOTE — PROGRESS NOTES
[facility] - Outpatient Consultation  Odessa Reich 79 y o  male MRN: [id number]  Encounter: [id number]          ASSESSMENT AND PLAN:      1  Gastroesophageal reflux disease without esophagitis  2  Iron deficiency anemia, unspecified iron deficiency anemia type  I will schedule him for an upper endoscopy to evaluate for reflux esophagitis, a hiatal hernia, or Young's esophagus  Because of his iron deficiency anemia I will also evaluate for celiac sprue and any source of chronic bleeding   - Ambulatory referral to Gastroenterology  - EGD; Future    3  History of colon polyps  He is due for his next surveillance colonoscopy in approximately three years because of his personal history of polyps and his family history of polyps  Depending on his upper endoscopy findings for his iron-deficiency anemia we may consider repeating his colonoscopy sooner  4  Pulmonary hypertension ([address] )  5  DIXIE not currently treated  He has mild pulmonary hypertension and obstructive sleep apnea but does not require oxygen supplementation  He believes his diagnoses are very mild and that he strongly prefers to have his EGD at the [place] [address] endoscopy center  He said he plans to discuss this with Dr Lazara Davis and will let me know  ______________________________________________________________________    HPI:  He presents for evaluation because of longstanding history of reflux symptoms  He said he has been on omeprazole for the past 10 years and feels this is helping  He has not had any dysphagia, nausea, vomiting, abdominal pain, change in bowel habits, bleeding, or weight loss  He has never previously had an upper endoscopy  He reports a history of colon polyps and said his last procedure was about two years ago and he is due for his next one in about three years  He also reports a family history of colon polyps        REVIEW OF SYSTEMS:    CONSTITUTIONAL: Denies any fever, chills, rigors, and weight loss   HEENT: No earache or tinnitus  Denies hearing loss or visual disturbances  CARDIOVASCULAR: No chest pain or palpitations  RESPIRATORY: Denies any cough, hemoptysis, shortness of breath or dyspnea on exertion  GASTROINTESTINAL: As noted in the History of Present Illness  GENITOURINARY: No problems with urination  Denies any hematuria or dysuria  NEUROLOGIC: No dizziness or vertigo, denies headaches  MUSCULOSKELETAL: Denies any muscle or joint pain  SKIN: Denies skin rashes or itching  ENDOCRINE: Denies excessive thirst  Denies intolerance to heat or cold  PSYCHOSOCIAL: Denies depression or anxiety  Denies any recent memory loss         Historical Information   Past Medical History:   Diagnosis Date    Anxiety     BPH (benign prostatic hypertrophy)     [facility]LL [facility]) [facility] [address] )     2009    Concussion     Resolved: 08/22/16    Depression     Gastrointestinal hemorrhage     Last assessed: 08/27/13    GERD (gastroesophageal reflux disease)     Hearing loss of aging     Hiatal hernia     Hyperlipidemia     Hypertension     Iron deficiency anemia     Microscopic hematuria     Last assessed: 06/28/13    OA (osteoarthritis)     right hip    Pneumothorax     Prostatitis     Pulmonary hypertension (HCC)     Seasonal allergies     UTI (urinary tract infection)     in past    Wears glasses      Past Surgical History:   Procedure Laterality Date    COLONOSCOPY      CYSTOSCOPY  10/09/2014    Diagnostic    FRACTURE SURGERY      left lower arm    FRACTURE SURGERY      left femur    HERNIA REPAIR      OTHER SURGICAL HISTORY  03/2011    Spinal anesthesia epidural    [facility]ROPLASTY Right 9/29/2017    Procedure: ARTHROPLASTY HIP TOTAL ANTERIOR;  Surgeon: Camille Royal MD;  Location: [facility];  Service: Orthopedics    TONSILLECTOMY AND ADENOIDECTOMY      TRANSURETHRAL RESECTION OF PROSTATE      x 2    WRIST SURGERY       Social History   Social History Substance and Sexual Activity   Alcohol Use Yes    Alcohol/week: 28 0 standard drinks    Types: 28 Cans of beer per week    Comment: 4 beers day     Social History     Substance and Sexual Activity   Drug Use Yes    Types: Marijuana    Comment: few x month     Social History     Tobacco Use   Smoking Status Former Smoker    Packs/day: 1 00    Years: 15 00    Pack years: 15     Types: Cigarettes    Start date:     Last attempt to quit: 1980    Years since quittin 0   Smokeless Tobacco Never Used     Family History   Problem Relation Age of Onset    No Known Problems Mother     Heart attack Father     Diabetes Brother     Prostate cancer Brother        Meds/Allergies       Current Outpatient Medications:     amLODIPine (NORVASC) 5 mg tablet    aspirin 81 MG tablet    atorvastatin (LIPITOR) 20 mg tablet    carvedilol (COREG) 12 5 mg tablet    fluticasone (FLONASE) 50 mcg/act nasal spray    gabapentin (NEURONTIN) 300 mg capsule    losartan (COZAAR) 100 MG tablet    Milk Thistle 1000 MG CAPS    minoxidil (LONITEN) 10 mg tablet    naproxen (EC NAPROSYN) 500 MG EC tablet    Omega-3 Fatty Acids (FISH OIL) 1,000 mg    omeprazole (PriLOSEC) 40 MG capsule    sertraline (ZOLOFT) 50 mg tablet    tamsulosin (FLOMAX) 0 4 mg    zolpidem (AMBIEN) 10 mg tablet    montelukast (SINGULAIR) 10 mg tablet    Allergies   Allergen Reactions    Codeine Other (See Comments)     agitated    Bactrim [Sulfamethoxazole-Trimethoprim] GI Intolerance           Objective     Blood pressure 118/76, pulse 69, temperature 98 6 °F (37 °C), height 5' 6" (1 676 m), weight 86 3 kg (190 lb 3 2 oz)  Body mass index is 30 7 kg/m²          PHYSICAL EXAM:      General Appearance:   Alert, cooperative, no distress   HEENT:   Normocephalic, atraumatic, anicteric      Neck:  Supple, symmetrical, trachea midline   Lungs:   Clear to auscultation bilaterally; no rales, rhonchi or wheezing; respirations unlabored  Heart[de-identified]   Regular rate and rhythm; no murmur, rub, or gallop  Abdomen:   Soft, obese, non-tender, non-distended; normal bowel sounds; no masses, no organomegaly    Genitalia:   Deferred    Rectal:   Deferred    Extremities:  No cyanosis, clubbing or edema    Pulses:  2+ and symmetric    Skin:  No jaundice, rashes, or lesions    Lymph nodes:  No palpable cervical lymphadenopathy        Lab Results:   No visits with results within 1 Day(s) from this visit     Latest known visit with results is:   Lab on 06/21/2019   Component Date Value    WBC 06/21/2019 19 96*    RBC 06/21/2019 4 22     Hemoglobin 06/21/2019 12 9     Hematocrit 06/21/2019 39 9     MCV 06/21/2019 95     MCH 06/21/2019 30 6     MCHC 06/21/2019 32 3     RDW 06/21/2019 14 6     MPV 06/21/2019 9 9     Platelets 71/79/3929 160     nRBC 06/21/2019 0     Ferritin 06/21/2019 27     Hepatitis C Ab 06/21/2019 Non-reactive     Sodium 06/21/2019 134*    Potassium 06/21/2019 4 4     Chloride 06/21/2019 100     CO2 06/21/2019 30     ANION GAP 06/21/2019 4     BUN 06/21/2019 9     Creatinine 06/21/2019 0 85     Glucose, Fasting 06/21/2019 106*    Calcium 06/21/2019 9 3     AST 06/21/2019 21     ALT 06/21/2019 33     Alkaline Phosphatase 06/21/2019 84     Total Protein 06/21/2019 8 8*    Albumin 06/21/2019 4 5     Total Bilirubin 06/21/2019 0 66     eGFR 06/21/2019 91     Hemoglobin A1C 06/21/2019 5 6     EAG 06/21/2019 114     Segmented % 06/21/2019 13*    Lymphocytes % 06/21/2019 84*    Monocytes % 06/21/2019 1*    Eosinophils, % 06/21/2019 1     Basophils % 06/21/2019 1     Absolute Neutrophils 06/21/2019 2 59     Lymphocytes Absolute 06/21/2019 16 77*    Monocytes Absolute 06/21/2019 0 20     Eosinophils Absolute 06/21/2019 0 20     Basophils Absolute 06/21/2019 0 20*    RBC Morphology 06/21/2019 Present     Anisocytosis 06/21/2019 Present     Microcytes 06/21/2019 Present     Ovalocytes 06/21/2019 Present     Poikilocytes 06/21/2019 Present     Stomatocytes 06/21/2019 Present     Platelet Estimate 33/28/0498 Adequate          Radiology Results:   No results found

## 2019-09-25 ENCOUNTER — OFFICE VISIT (OUTPATIENT)
Dept: FAMILY MEDICINE CLINIC | Facility: CLINIC | Age: 69
End: 2019-09-25
Payer: MEDICARE

## 2019-09-25 VITALS
BODY MASS INDEX: 30.67 KG/M2 | OXYGEN SATURATION: 95 % | HEART RATE: 68 BPM | TEMPERATURE: 99.7 F | DIASTOLIC BLOOD PRESSURE: 80 MMHG | SYSTOLIC BLOOD PRESSURE: 144 MMHG | WEIGHT: 190 LBS

## 2019-09-25 DIAGNOSIS — J06.9 VIRAL UPPER RESPIRATORY TRACT INFECTION: Primary | ICD-10-CM

## 2019-09-25 DIAGNOSIS — M16.11 PRIMARY OSTEOARTHRITIS OF RIGHT HIP: ICD-10-CM

## 2019-09-25 PROCEDURE — 99213 OFFICE O/P EST LOW 20 MIN: CPT | Performed by: FAMILY MEDICINE

## 2019-09-25 RX ORDER — NAPROXEN 500 MG/1
500 TABLET ORAL 2 TIMES DAILY WITH MEALS
Qty: 60 TABLET | Refills: 2 | Status: SHIPPED | OUTPATIENT
Start: 2019-09-25 | End: 2021-01-21 | Stop reason: HOSPADM

## 2019-09-25 NOTE — PROGRESS NOTES
Assessment/Plan:    He appears to have viral upper respiratory infection  Will treat symptomatically with continuation of Mucinex in the morning and increased hydration with water or flavored water  He may use extra-strength Tylenol up to the maximum  I also gave him refill on his naproxen 500 mg to use once or twice per day with meals if needed for back pain     Diagnoses and all orders for this visit:    Viral upper respiratory tract infection    Primary osteoarthritis of right hip  -     naproxen (EC NAPROSYN) 500 MG EC tablet; Take 1 tablet (500 mg total) by mouth 2 (two) times a day with meals          Subjective:      Patient ID: Garrick Marshall is a 81 y o  male  He is here with his wife with complaint of upper respiratory infection symptoms which began 2 days ago  It started with scratchy throat then went to nasal congestion and coughing  He is bringing up some yellow mucus  Notes a lot of fatigue and he felt warm like he may have had a fever  He got a little relief from Mucinex  No GI sx      The following portions of the patient's history were reviewed and updated as appropriate: allergies, current medications, past family history, past medical history, past social history, past surgical history and problem list     Review of Systems   Constitutional: Positive for fatigue and fever  Negative for activity change and chills  HENT: Positive for congestion, sneezing and sore throat  Negative for ear pain  Respiratory: Positive for cough  Gastrointestinal: Positive for nausea  Negative for diarrhea and vomiting  Neurological: Negative for dizziness and headaches  Objective:      /80 (BP Location: Left arm, Patient Position: Sitting, Cuff Size: Standard)   Pulse 68   Temp 99 7 °F (37 6 °C)   Wt 86 2 kg (190 lb)   SpO2 95%   BMI 30 67 kg/m²          Physical Exam   Constitutional: He is oriented to person, place, and time  He appears well-developed and well-nourished  HENT:   Head: Normocephalic and atraumatic  Right Ear: External ear normal    Left Ear: External ear normal    Mouth/Throat: Oropharynx is clear and moist    Nose with mucosal edema and serous drainage   Neck: Normal range of motion  Neck supple  No thyromegaly present  Cardiovascular: Normal rate, regular rhythm and normal heart sounds  Pulmonary/Chest: Breath sounds normal  No respiratory distress  Occasional cough   Musculoskeletal: Normal range of motion  Lymphadenopathy:     He has no cervical adenopathy  Neurological: He is alert and oriented to person, place, and time  Skin: Skin is warm and dry  Nursing note and vitals reviewed

## 2019-10-07 ENCOUNTER — ANESTHESIA EVENT (OUTPATIENT)
Dept: GASTROENTEROLOGY | Facility: MEDICAL CENTER | Age: 69
End: 2019-10-07

## 2019-10-08 ENCOUNTER — ANESTHESIA (OUTPATIENT)
Dept: GASTROENTEROLOGY | Facility: MEDICAL CENTER | Age: 69
End: 2019-10-08

## 2019-10-08 ENCOUNTER — HOSPITAL ENCOUNTER (OUTPATIENT)
Dept: GASTROENTEROLOGY | Facility: MEDICAL CENTER | Age: 69
Setting detail: OUTPATIENT SURGERY
Discharge: HOME | End: 2019-10-08
Attending: INTERNAL MEDICINE | Admitting: INTERNAL MEDICINE
Payer: MEDICARE

## 2019-10-08 VITALS
BODY MASS INDEX: 28.64 KG/M2 | HEART RATE: 55 BPM | RESPIRATION RATE: 20 BRPM | HEIGHT: 68 IN | OXYGEN SATURATION: 99 % | TEMPERATURE: 98.5 F | SYSTOLIC BLOOD PRESSURE: 167 MMHG | WEIGHT: 189 LBS | DIASTOLIC BLOOD PRESSURE: 76 MMHG

## 2019-10-08 DIAGNOSIS — K21.9 GASTROESOPHAGEAL REFLUX DISEASE WITHOUT ESOPHAGITIS: ICD-10-CM

## 2019-10-08 DIAGNOSIS — D50.9 IRON DEFICIENCY ANEMIA, UNSPECIFIED IRON DEFICIENCY ANEMIA TYPE: ICD-10-CM

## 2019-10-08 PROCEDURE — 88305 TISSUE EXAM BY PATHOLOGIST: CPT | Performed by: PATHOLOGY

## 2019-10-08 PROCEDURE — 43239 EGD BIOPSY SINGLE/MULTIPLE: CPT | Performed by: INTERNAL MEDICINE

## 2019-10-08 RX ORDER — PROPOFOL 10 MG/ML
INJECTION, EMULSION INTRAVENOUS AS NEEDED
Status: DISCONTINUED | OUTPATIENT
Start: 2019-10-08 | End: 2019-10-08 | Stop reason: SURG

## 2019-10-08 RX ORDER — SODIUM CHLORIDE 9 MG/ML
125 INJECTION, SOLUTION INTRAVENOUS CONTINUOUS
Status: DISCONTINUED | OUTPATIENT
Start: 2019-10-08 | End: 2019-10-12 | Stop reason: HOSPADM

## 2019-10-08 RX ADMIN — PROPOFOL 20 MG: 10 INJECTION, EMULSION INTRAVENOUS at 12:46

## 2019-10-08 RX ADMIN — PROPOFOL 20 MG: 10 INJECTION, EMULSION INTRAVENOUS at 12:49

## 2019-10-08 RX ADMIN — PROPOFOL 30 MG: 10 INJECTION, EMULSION INTRAVENOUS at 12:45

## 2019-10-08 RX ADMIN — SODIUM CHLORIDE 125 ML/HR: 0.9 INJECTION, SOLUTION INTRAVENOUS at 11:51

## 2019-10-08 RX ADMIN — PROPOFOL 20 MG: 10 INJECTION, EMULSION INTRAVENOUS at 12:56

## 2019-10-08 RX ADMIN — PROPOFOL 100 MG: 10 INJECTION, EMULSION INTRAVENOUS at 12:42

## 2019-10-08 RX ADMIN — PROPOFOL 30 MG: 10 INJECTION, EMULSION INTRAVENOUS at 12:43

## 2019-10-08 RX ADMIN — PROPOFOL 20 MG: 10 INJECTION, EMULSION INTRAVENOUS at 12:54

## 2019-10-08 RX ADMIN — PROPOFOL 20 MG: 10 INJECTION, EMULSION INTRAVENOUS at 12:52

## 2019-10-08 RX ADMIN — LIDOCAINE HYDROCHLORIDE 40 MG: 20 INJECTION, SOLUTION INTRAVENOUS at 12:42

## 2019-10-08 NOTE — H&P
History and Physical - [facility]  Blair Guevara 81 y o  male MRN: [id number]    HPI: Blair Guevara is a 81y o  year old male who presents for  Upper endoscopy in setting longstanding GERD and iron deficiency anemia  Patient longstanding history of GERD in addition to iron deficient anemia  Patient will undergo upper endoscopy to rule out esophagitis, Young's esophagus  Will screen for celiac  REVIEW OF SYSTEMS: Per the HPI, and otherwise unremarkable      Historical Information   Past Medical History:   Diagnosis Date    Anxiety     BPH (benign prostatic hypertrophy)     [email or web address] )     2009    Colon polyp     Concussion     Resolved: 08/22/16    Depression     Gastrointestinal hemorrhage     Last assessed: 08/27/13    GERD (gastroesophageal reflux disease)     Hearing loss of aging     Hiatal hernia     Hyperlipidemia     Hypertension     Iron deficiency anemia     Microscopic hematuria     Last assessed: 06/28/13    OA (osteoarthritis)     right hip    Pneumothorax     Prostatitis     Pulmonary hypertension (HCC)     Seasonal allergies     UTI (urinary tract infection)     in past    Wears glasses      Past Surgical History:   Procedure Laterality Date    COLONOSCOPY      CYSTOSCOPY  10/09/2014    Diagnostic    FRACTURE SURGERY      left lower arm    FRACTURE SURGERY      left femur    HERNIA REPAIR      OTHER SURGICAL HISTORY  03/2011    Spinal anesthesia epidural    [place] T[place] H[place] ARTHROPLASTY Right 9/29/2017    Procedure: ARTHROPLASTY HIP TOTAL ANTERIOR;  Surgeon: Isidoro Perez MD;  Location: [facility];  Service: Orthopedics    TONSILLECTOMY AND ADENOIDECTOMY      TRANSURETHRAL RESECTION OF PROSTATE      x 2    WRIST SURGERY       Social History   Social History     Substance and Sexual Activity   Alcohol Use Yes    Alcohol/week: 20 0 standard drinks    Types: 20 Cans of beer per week    Drinks per session: 3 or 4 Social History     Substance and Sexual Activity   Drug Use Not Currently    Types: Marijuana    Comment: few x month     Social History     Tobacco Use   Smoking Status Former Smoker    Packs/day: 1 00    Years: 15 00    Pack years: 15 00    Types: Cigarettes    Start date:     Last attempt to quit: 1980    Years since quittin 1   Smokeless Tobacco Never Used     Family History   Problem Relation Age of Onset    No Known Problems Mother     Heart attack Father     Diabetes Brother     Prostate cancer Brother        Meds/Allergies       (Not in a hospital admission)    Allergies   Allergen Reactions    Codeine Other (See Comments)     agitated    Bactrim [Sulfamethoxazole-Trimethoprim] GI Intolerance       Objective     BP (!) 182/83   Pulse 58   Temp 98 5 °F (36 9 °C) (Temporal)   Resp 16   Ht 5' 8" (1 727 m)   Wt 85 7 kg (189 lb)   SpO2 96%   BMI 28 74 kg/m²       PHYSICAL EXAM    Gen: NAD  CV: RRR  CHEST: Clear  ABD: soft, NT/ND  EXT: no edema      ASSESSMENT/PLAN:  This is a 81y o  year old male here for   Upper endoscopy in setting of GERD and iron deficient anemia, and he is stable and optimized for his procedure

## 2019-10-08 NOTE — ANESTHESIA PREPROCEDURE EVALUATION
Review of Systems/Medical History  Patient summary reviewed  Chart reviewed  No history of anesthetic complications     Cardiovascular  Hyperlipidemia, Hypertension ,   Pulmonary hypertension (mild, per pmd note) Pulmonary  Smoker ex-smoker  , Sleep apnea ,        GI/Hepatic    GERD ,  Hiatal hernia,             Endo/Other    Comment: ETOH daily    GYN       Hematology  Anemia iron deficiency anemia,    Comment: CLL Musculoskeletal    Arthritis     Neurology      Comment: Concussion 2016 Psychology   Anxiety, Depression ,              Physical Exam    Airway    Mallampati score: II  TM Distance: >3 FB  Neck ROM: full     Dental   No notable dental hx     Cardiovascular  Rhythm: regular, Rate: normal, Cardiovascular exam normal    Pulmonary  Pulmonary exam normal Breath sounds clear to auscultation,     Other Findings        Anesthesia Plan  ASA Score- 3     Anesthesia Type- IV sedation with anesthesia with ASA Monitors  Additional Monitors:   Airway Plan:         Plan Factors-    Induction- intravenous  Postoperative Plan-     Informed Consent- Anesthetic plan and risks discussed with patient

## 2019-10-08 NOTE — DISCHARGE INSTRUCTIONS
Upper Endoscopy   WHAT YOU NEED TO KNOW:   An upper endoscopy is also called an upper gastrointestinal (GI) endoscopy, or an esophagogastroduodenoscopy (EGD)  You may feel bloated, gassy, or have some abdominal discomfort after your procedure  Your throat may be sore for 24 to 36 hours  You may burp or pass gas from air that is still inside your body  DISCHARGE INSTRUCTIONS:   Call 911 for any of the following:   · You have sudden chest pain or trouble breathing  Seek care immediately if:   · You feel dizzy or faint  · You have trouble swallowing  · Your bowel movements are very dark or black  · Your abdomen is hard and firm and you have severe pain  · You vomit blood  Contact your healthcare provider if:   · You feel full or bloated and cannot burp or pass gas  · You have not had a bowel movement for 3 days after your procedure  · You have neck pain  · You have a fever or chills  · You have nausea or are vomiting  · You have a rash or hives  · You have questions or concerns about your endoscopy  Relieve a sore throat:  Suck on throat lozenges or crushed ice  Gargle with a small amount of warm salt water  Mix 1 teaspoon of salt and 1 cup of warm water to make salt water  Relieve gas and discomfort from bloating:  Lie on your right side with a heating pad on your abdomen  Take short walks to help pass gas  Eat small meals until bloating is relieved  Rest after your procedure: You have been given medicine to relax you  Do not  drive or make important decisions until the day after your procedure  Return to your normal activity as directed  You can usually return to work the day after your procedure  Follow up with your healthcare provider as directed:  Write down your questions so you remember to ask them during your visits     © 2017 [address] is for End User's use only and may not be sold, redistributed or otherwise used for commercial purposes  All illustrations and images included in CareNotes® are the copyrighted property of A D A M , Inc  or Maycol Vaughn  The above information is an  only  It is not intended as medical advice for individual conditions or treatments  Talk to your doctor, nurse or pharmacist before following any medical regimen to see if it is safe and effective for you

## 2019-10-15 ENCOUNTER — OFFICE VISIT (OUTPATIENT)
Dept: PULMONOLOGY | Facility: CLINIC | Age: 69
End: 2019-10-15
Payer: MEDICARE

## 2019-10-15 ENCOUNTER — IMMUNIZATIONS (OUTPATIENT)
Dept: FAMILY MEDICINE CLINIC | Facility: CLINIC | Age: 69
End: 2019-10-15
Payer: MEDICARE

## 2019-10-15 VITALS
HEART RATE: 64 BPM | TEMPERATURE: 97.9 F | DIASTOLIC BLOOD PRESSURE: 78 MMHG | BODY MASS INDEX: 28.82 KG/M2 | SYSTOLIC BLOOD PRESSURE: 140 MMHG | WEIGHT: 190.2 LBS | OXYGEN SATURATION: 96 % | HEIGHT: 68 IN | RESPIRATION RATE: 18 BRPM

## 2019-10-15 DIAGNOSIS — Z23 FLU VACCINE NEED: Primary | ICD-10-CM

## 2019-10-15 DIAGNOSIS — G47.33 OSA (OBSTRUCTIVE SLEEP APNEA): ICD-10-CM

## 2019-10-15 DIAGNOSIS — I27.20 PULMONARY HYPERTENSION (HCC): Primary | ICD-10-CM

## 2019-10-15 PROCEDURE — G0008 ADMIN INFLUENZA VIRUS VAC: HCPCS

## 2019-10-15 PROCEDURE — 99213 OFFICE O/P EST LOW 20 MIN: CPT | Performed by: INTERNAL MEDICINE

## 2019-10-15 PROCEDURE — 90662 IIV NO PRSV INCREASED AG IM: CPT

## 2019-10-15 NOTE — PROGRESS NOTES
Progress note - Pulmonary Medicine   Walker Parks 81 y o  male MRN: [id number]       Impression & Plan:     Pulmonary hypertension ([address] )  · Continues to be asymptomatic  · No evidence of right ventricular pressure volume overload by clinical examination despite echocardiogram findings of significant pulmonary hypertension  · Patient does not wish to pursue right heart catheterization and we will continue with clinical surveillance  · Instructed to call me with any new or worsening symptoms  · Currently will hold off on repeat echocardiography unless symptoms are changing substantially    DIXIE not currently treated  · Noted to have desaturation under anesthesia at his most recent endoscopy evaluation  · This desaturation is most certainly secondary to his obstructive sleep apnea  · Does not wish to pursue treatment    Will continue to follow up in 6 months  He has an appointment later today for influenza vaccination with his primary care physician  ______________________________________________________________________    HPI:    Walker Parsk presents today for follow-up of pulmonary hypertension and obstructive sleep apnea  He has been doing well from a symptom perspective  He reports no exertional shortness of breath  He has joined a gym and is trying to exercise more regularly he does not report any difficulty with his normal daily activities  He does not have leg swelling increase in abdominal girth, or other symptoms of right ventricular volume or pressure overload  No other cardiac complaints  Denies palpitations  Denies chest pain  No significant change in appetite or weight  He does have obstructive sleep apnea which is untreated  He denies any lightheadedness or dizziness  No headache  No visual change  No sore throat or upper respiratory complaints  Recently had EGD which showed a benign gastric polyp and a watermelon stomach  All biopsies were negative    He is planned to undergo a secondary ablation procedure for the GAVE with Dr Christopher Prasad    Current Medications:    Current Outpatient Medications:     amLODIPine (NORVASC) 5 mg tablet, Take 1 tablet (5 mg total) by mouth every evening, Disp: 90 tablet, Rfl: 3    aspirin 81 MG tablet, Take 1 tablet by mouth daily, Disp: , Rfl:     atorvastatin (LIPITOR) 20 mg tablet, Take 1 tablet (20 mg total) by mouth daily, Disp: 90 tablet, Rfl: 3    carvedilol (COREG) 12 5 mg tablet, Take 1 tablet (12 5 mg total) by mouth 2 (two) times a day with meals, Disp: 180 tablet, Rfl: 3    fluticasone (FLONASE) 50 mcg/act nasal spray, 2 sprays into each nostril daily as needed for allergies, Disp: 3 Bottle, Rfl: 3    gabapentin (NEURONTIN) 300 mg capsule, gabapentin 300 mg capsule, Disp: , Rfl:     losartan (COZAAR) 100 MG tablet, Take 1 tablet (100 mg total) by mouth daily, Disp: 90 tablet, Rfl: 3    Milk Thistle 1000 MG CAPS, Take 250 mg by mouth , Disp: , Rfl:     minoxidil (LONITEN) 10 mg tablet, TAKE 1 TABLET BY MOUTH ONCE DAILY, Disp: 90 tablet, Rfl: 3    naproxen (EC NAPROSYN) 500 MG EC tablet, Take 1 tablet (500 mg total) by mouth 2 (two) times a day with meals, Disp: 60 tablet, Rfl: 2    Omega-3 Fatty Acids (FISH OIL) 1,000 mg, Take by mouth daily, Disp: , Rfl:     omeprazole (PriLOSEC) 40 MG capsule, Take 1 capsule (40 mg total) by mouth daily, Disp: 90 capsule, Rfl: 3    sertraline (ZOLOFT) 50 mg tablet, Take 1 tablet (50 mg total) by mouth daily, Disp: 90 tablet, Rfl: 3    tamsulosin (FLOMAX) 0 4 mg, TAKE ONE CAPSULE BY MOUTH EVERY DAY WITH DINNER, Disp: 90 capsule, Rfl: 3    zolpidem (AMBIEN) 10 mg tablet, TAKE ONE TABLET BY MOUTH AT BEDTIME AS NEEDED FOR SLEEP, Disp: 90 tablet, Rfl: 0    montelukast (SINGULAIR) 10 mg tablet, Take 1 tablet (10 mg total) by mouth daily for 180 days (Patient taking differently: Take 10 mg by mouth daily as needed ), Disp: 90 tablet, Rfl: 3    Review of Systems:  Aside from what is mentioned in the HPI is otherwise negative    Past medical history, surgical history, and family history were reviewed and updated as appropriate    Social history updates:  Social History     Tobacco Use   Smoking Status Former Smoker    Packs/day: 1 00    Years: 15 00    Pack years: 15 00    Types: Cigarettes    Start date:     Last attempt to quit: 1980    Years since quittin 1   Smokeless Tobacco Never Used       PhysicalExamination:  Vitals:   /78 (BP Location: Left arm, Patient Position: Sitting)   Pulse 64   Temp 97 9 °F (36 6 °C)   Resp 18   Ht 5' 8" (1 727 m)   Wt 86 3 kg (190 lb 3 2 oz)   SpO2 96%   BMI 28 92 kg/m²     Gen: Comfortable  Non-labored  HEENT: PERRL  O/P: clear  moist  Neck: Trachea is midline  No JVD  No adenopathy  Chest:  Clear breath sounds bilaterally  Symmetric chest wall excursion  Normal resonance to percussion  Cardiac:  Regular  no murmur  Abdomen: Soft and nontender  Bowel sounds are present  Extremities: No edema    Diagnostic Data:  Labs: I personally reviewed the most recent laboratory data pertinent to today's visit    Lab Results   Component Value Date    WBC 19 96 (H) 2019    HGB 12 9 2019    HCT 39 9 2019    MCV 95 2019     2019     Lab Results   Component Value Date    SODIUM 134 (L) 2019    K 4 4 2019    CO2 30 2019     2019    BUN 9 2019    CREATININE 0 85 2019    CALCIUM 9 3 2019         Other studies:  Pathology from recent EGD was reviewed    No evidence of dysplasia and polyp was benign    Mimi Roque MD

## 2019-10-15 NOTE — ASSESSMENT & PLAN NOTE
· Continues to be asymptomatic  · No evidence of right ventricular pressure volume overload by clinical examination despite echocardiogram findings of significant pulmonary hypertension  · Patient does not wish to pursue right heart catheterization and we will continue with clinical surveillance  · Instructed to call me with any new or worsening symptoms    · Currently will hold off on repeat echocardiography unless symptoms are changing substantially

## 2019-10-15 NOTE — ASSESSMENT & PLAN NOTE
· Noted to have desaturation under anesthesia at his most recent endoscopy evaluation    · This desaturation is most certainly secondary to his obstructive sleep apnea  · Does not wish to pursue treatment

## 2019-10-21 ENCOUNTER — TELEPHONE (OUTPATIENT)
Dept: GASTROENTEROLOGY | Facility: AMBULARY SURGERY CENTER | Age: 69
End: 2019-10-21

## 2019-10-21 NOTE — TELEPHONE ENCOUNTER
Patients GI provider:  Dr Karolina Oscar    Number to return call: [phone]    Reason for call: Pt calling back to get egd results, received vm from dr Niles Stanley    Scheduled procedure/appointment date if applicable: na

## 2019-11-13 NOTE — TELEPHONE ENCOUNTER
Spoke to pt he states dr Cadence Quintanilla wanted two procedures done, I do not see anything ordered   I will reach out to dr Cadence Quintanilla and call the pt back

## 2019-11-14 ENCOUNTER — PREP FOR PROCEDURE (OUTPATIENT)
Dept: GASTROENTEROLOGY | Facility: MEDICAL CENTER | Age: 69
End: 2019-11-14

## 2019-11-14 DIAGNOSIS — K31.819 GAVE (GASTRIC ANTRAL VASCULAR ECTASIA): Primary | ICD-10-CM

## 2019-11-14 NOTE — TELEPHONE ENCOUNTER
Pt is scheduled with dr Sherie Guillaume at [facility] lab on 1/2/19 for egd with APC I mailed out instructions and hosp packet   Pt is aware it is a pm procedure

## 2019-11-23 ENCOUNTER — OFFICE VISIT (OUTPATIENT)
Dept: URGENT CARE | Age: 69
End: 2019-11-23
Payer: MEDICARE

## 2019-11-23 VITALS
WEIGHT: 192.4 LBS | SYSTOLIC BLOOD PRESSURE: 140 MMHG | HEIGHT: 68 IN | DIASTOLIC BLOOD PRESSURE: 66 MMHG | HEART RATE: 55 BPM | BODY MASS INDEX: 29.16 KG/M2 | TEMPERATURE: 98.7 F | OXYGEN SATURATION: 97 % | RESPIRATION RATE: 18 BRPM

## 2019-11-23 DIAGNOSIS — K57.92 DIVERTICULITIS: Primary | ICD-10-CM

## 2019-11-23 LAB
SL AMB  POCT GLUCOSE, UA: NEGATIVE
SL AMB LEUKOCYTE ESTERASE,UA: NEGATIVE
SL AMB POCT BILIRUBIN,UA: NEGATIVE
SL AMB POCT BLOOD,UA: ABNORMAL
SL AMB POCT CLARITY,UA: CLEAR
SL AMB POCT COLOR,UA: ABNORMAL
SL AMB POCT KETONES,UA: NEGATIVE
SL AMB POCT NITRITE,UA: NEGATIVE
SL AMB POCT PH,UA: 5
SL AMB POCT SPECIFIC GRAVITY,UA: 1.01
SL AMB POCT URINE PROTEIN: NEGATIVE
SL AMB POCT UROBILINOGEN: 0.2

## 2019-11-23 PROCEDURE — 99203 OFFICE O/P NEW LOW 30 MIN: CPT | Performed by: PHYSICIAN ASSISTANT

## 2019-11-23 PROCEDURE — G0463 HOSPITAL OUTPT CLINIC VISIT: HCPCS | Performed by: PHYSICIAN ASSISTANT

## 2019-11-23 PROCEDURE — 81002 URINALYSIS NONAUTO W/O SCOPE: CPT | Performed by: PHYSICIAN ASSISTANT

## 2019-11-23 RX ORDER — LEVOFLOXACIN 750 MG/1
750 TABLET ORAL EVERY 24 HOURS
Qty: 7 TABLET | Refills: 0 | Status: SHIPPED | OUTPATIENT
Start: 2019-11-23 | End: 2019-11-30

## 2019-11-23 NOTE — PROGRESS NOTES
[facility]        NAME: Ken Amaya is a 81 y o  male  : 1950    MRN: [id number]  DATE: 2019  TIME: 11:23 AM    /66 (BP Location: Right arm, Patient Position: Sitting, Cuff Size: Adult)   Pulse 55   Temp 98 7 °F (37 1 °C) (Tympanic)   Resp 18   Ht 5' 8" (1 727 m)   Wt 87 3 kg (192 lb 6 4 oz)   SpO2 97%   BMI 29 25 kg/m²     Assessment and Plan   Diverticulitis [K57 92]  1  Diverticulitis  levofloxacin (LEVAQUIN) 750 mg tablet         Patient Instructions       Follow up with PCP in 3-5 days  Proceed to  ER if symptoms worsen  Chief Complaint     Chief Complaint   Patient presents with    Abdominal Pain     since yesterday, LLQ pain  urinary frequency  hx of diverticulitis  History of Present Illness       Pt with llq pain  X 2 days  Feels like when he had diverticulitis several yrs ago      Abdominal Pain   This is a new problem  The current episode started yesterday  The onset quality is gradual  The problem occurs constantly  The most recent episode lasted 2 days  The problem has been unchanged  The pain is located in the LLQ  The pain is at a severity of 2/10  The pain is mild  The abdominal pain does not radiate  Pertinent negatives include no anorexia, arthralgias, belching, constipation, diarrhea, dysuria, fever, flatus, frequency, headaches, hematochezia, hematuria, melena, myalgias, nausea, vomiting or weight loss  Nothing aggravates the pain  The pain is relieved by nothing  He has tried nothing for the symptoms  There is no history of abdominal surgery, colon cancer, Crohn's disease, gallstones, GERD, irritable bowel syndrome, pancreatitis, PUD or ulcerative colitis  Review of Systems   Review of Systems   Constitutional: Negative  Negative for fever and weight loss  HENT: Negative  Eyes: Negative  Respiratory: Negative  Cardiovascular: Negative  Gastrointestinal: Positive for abdominal pain   Negative for anorexia, constipation, diarrhea, flatus, hematochezia, melena, nausea and vomiting  Endocrine: Negative  Genitourinary: Negative  Negative for dysuria, frequency and hematuria  Musculoskeletal: Negative  Negative for arthralgias and myalgias  Allergic/Immunologic: Negative  Neurological: Negative  Negative for headaches  Hematological: Negative  Psychiatric/Behavioral: Negative  All other systems reviewed and are negative          Current Medications       Current Outpatient Medications:     amLODIPine (NORVASC) 5 mg tablet, Take 1 tablet (5 mg total) by mouth every evening, Disp: 90 tablet, Rfl: 3    aspirin 81 MG tablet, Take 1 tablet by mouth daily, Disp: , Rfl:     atorvastatin (LIPITOR) 20 mg tablet, Take 1 tablet (20 mg total) by mouth daily, Disp: 90 tablet, Rfl: 3    carvedilol (COREG) 12 5 mg tablet, Take 1 tablet (12 5 mg total) by mouth 2 (two) times a day with meals, Disp: 180 tablet, Rfl: 3    fluticasone (FLONASE) 50 mcg/act nasal spray, 2 sprays into each nostril daily as needed for allergies, Disp: 3 Bottle, Rfl: 3    losartan (COZAAR) 100 MG tablet, Take 1 tablet (100 mg total) by mouth daily, Disp: 90 tablet, Rfl: 3    Milk Thistle 1000 MG CAPS, Take 250 mg by mouth , Disp: , Rfl:     minoxidil (LONITEN) 10 mg tablet, TAKE 1 TABLET BY MOUTH ONCE DAILY, Disp: 90 tablet, Rfl: 3    naproxen (EC NAPROSYN) 500 MG EC tablet, Take 1 tablet (500 mg total) by mouth 2 (two) times a day with meals, Disp: 60 tablet, Rfl: 2    Omega-3 Fatty Acids (FISH OIL) 1,000 mg, Take by mouth daily, Disp: , Rfl:     omeprazole (PriLOSEC) 40 MG capsule, Take 1 capsule (40 mg total) by mouth daily, Disp: 90 capsule, Rfl: 3    sertraline (ZOLOFT) 50 mg tablet, Take 1 tablet (50 mg total) by mouth daily, Disp: 90 tablet, Rfl: 3    tamsulosin (FLOMAX) 0 4 mg, TAKE ONE CAPSULE BY MOUTH EVERY DAY WITH DINNER, Disp: 90 capsule, Rfl: 3    zolpidem (AMBIEN) 10 mg tablet, TAKE ONE TABLET BY MOUTH AT BEDTIME AS NEEDED FOR SLEEP, Disp: 90 tablet, Rfl: 0    gabapentin (NEURONTIN) 300 mg capsule, gabapentin 300 mg capsule, Disp: , Rfl:     levofloxacin (LEVAQUIN) 750 mg tablet, Take 1 tablet (750 mg total) by mouth every 24 hours for 7 days, Disp: 7 tablet, Rfl: 0    montelukast (SINGULAIR) 10 mg tablet, Take 1 tablet (10 mg total) by mouth daily for 180 days (Patient taking differently: Take 10 mg by mouth daily as needed ), Disp: 90 tablet, Rfl: 3    Current Allergies     Allergies as of 11/23/2019 - Reviewed 11/23/2019   Allergen Reaction Noted    Codeine Other (See Comments) 11/11/2016    Bactrim [sulfamethoxazole-trimethoprim] GI Intolerance 11/11/2016            The following portions of the patient's history were reviewed and updated as appropriate: allergies, current medications, past family history, past medical history, past social history, past surgical history and problem list      Past Medical History:   Diagnosis Date    Anxiety     BPH (benign prostatic hypertrophy)     CLL (chronic lymphocytic leukemia) ([facility] )     2009    Colon polyp     Concussion     Resolved: 08/22/16    Depression     Gastrointestinal hemorrhage     Last assessed: 08/27/13    GERD (gastroesophageal reflux disease)     Hearing loss of aging     Hiatal hernia     Hyperlipidemia     Hypertension     Iron deficiency anemia     Microscopic hematuria     Last assessed: 06/28/13    OA (osteoarthritis)     right hip    Pneumothorax     Prostatitis     Pulmonary hypertension ([facility] )     Seasonal allergies     UTI (urinary tract infection)     in past    Wears glasses        Past Surgical History:   Procedure Laterality Date    COLONOSCOPY      CYSTOSCOPY  10/09/2014    Diagnostic    FRACTURE SURGERY      left lower arm    FRACTURE SURGERY      left femur    HERNIA REPAIR      OTHER SURGICAL HISTORY  03/2011    Spinal anesthesia epidural    UT TOTAL HIP ARTHROPLASTY Right 9/29/2017    Procedure: ARTHROPLASTY HIP TOTAL ANTERIOR;  Surgeon: Otis Rincon MD;  Location: [facility];  Service: Orthopedics    TONSILLECTOMY AND ADENOIDECTOMY      TRANSURETHRAL RESECTION OF PROSTATE      x 2    WRIST SURGERY         Family History   Problem Relation Age of Onset    No Known Problems Mother     Heart attack Father     Diabetes Brother     Prostate cancer Brother          Medications have been verified  Objective   /66 (BP Location: Right arm, Patient Position: Sitting, Cuff Size: Adult)   Pulse 55   Temp 98 7 °F (37 1 °C) (Tympanic)   Resp 18   Ht 5' 8" (1 727 m)   Wt 87 3 kg (192 lb 6 4 oz)   SpO2 97%   BMI 29 25 kg/m²        Physical Exam     Physical Exam   Constitutional: He appears well-developed and well-nourished  Discussed with pt if not improving will need ct scan at er  Pt agrees    HENT:   Head: Normocephalic and atraumatic  Mouth/Throat: Oropharynx is clear and moist    Eyes: Pupils are equal, round, and reactive to light  EOM are normal    Cardiovascular: Normal rate, normal heart sounds and intact distal pulses  Pulmonary/Chest: Effort normal and breath sounds normal    Abdominal: Normal appearance and bowel sounds are normal  There is tenderness in the left lower quadrant  Skin: Skin is warm  Capillary refill takes less than 2 seconds  Nursing note and vitals reviewed

## 2019-11-23 NOTE — PATIENT INSTRUCTIONS
Diverticulitis   WHAT YOU NEED TO KNOW:   Diverticulitis is a condition that causes small pockets along your intestine called diverticula to become inflamed or infected  This is caused by hard bowel movements, food, or bacteria that get stuck in the pockets  DISCHARGE INSTRUCTIONS:   Return to the emergency department if:   · You have bowel movement or foul-smelling discharge leaking from your vagina or in your urine  · You have severe diarrhea  · You urinate less than usual or not at all  · You are not able to have a bowel movement  · You cannot stop vomiting  · You have severe abdominal pain, a fever, and your abdomen is larger than usual      · You have new or increased blood in your bowel movements  Contact your healthcare provider if:   · You have pain when you urinate  · Your symptoms get worse or do not go away  · You have questions or concerns about your condition or care  Medicines:   · Antibiotics  may be given to help treat a bacterial infection  · Prescription pain medicine  may be given  Ask your healthcare provider how to take this medicine safely  Some prescription pain medicines contain acetaminophen  Do not take other medicines that contain acetaminophen without talking to your healthcare provider  Too much acetaminophen may cause liver damage  Prescription pain medicine may cause constipation  Ask your healthcare provider how to prevent or treat constipation  · Take your medicine as directed  Contact your healthcare provider if you think your medicine is not helping or if you have side effects  Tell him or her if you are allergic to any medicine  Keep a list of the medicines, vitamins, and herbs you take  Include the amounts, and when and why you take them  Bring the list or the pill bottles to follow-up visits  Carry your medicine list with you in case of an emergency  Clear liquid diet:  A clear liquid diet includes any liquids that you can see through  Examples include water, ginger-dheeraj, cranberry or apple juice, frozen fruit ice, or broth  Stay on a clear liquid diet until your symptoms are gone, or as directed  Follow up with your healthcare provider as directed: You may need to return for a colonoscopy  When your symptoms are gone, you may need a low-fat, high-fiber diet to prevent diverticulitis from developing again  Your healthcare provider or dietitian can help you create meal plans  Write down your questions so you remember to ask them during your visits  © 2017 [facility] Information is for End User's use only and may not be sold, redistributed or otherwise used for commercial purposes  All illustrations and images included in CareNotes® are the copyrighted property of A D A M , Inc  or Maycol Vaughn  The above information is an  only  It is not intended as medical advice for individual conditions or treatments  Talk to your doctor, nurse or pharmacist before following any medical regimen to see if it is safe and effective for you

## 2019-11-27 DIAGNOSIS — E78.00 HYPERCHOLESTEREMIA: ICD-10-CM

## 2019-11-27 RX ORDER — MINOXIDIL 10 MG/1
TABLET ORAL
Qty: 90 TABLET | Refills: 3 | Status: SHIPPED | OUTPATIENT
Start: 2019-11-27 | End: 2020-11-18

## 2019-12-05 DIAGNOSIS — J30.1 ALLERGIC RHINITIS DUE TO POLLEN, UNSPECIFIED SEASONALITY: ICD-10-CM

## 2019-12-05 RX ORDER — MONTELUKAST SODIUM 10 MG/1
10 TABLET ORAL DAILY
Qty: 90 TABLET | Refills: 3 | Status: SHIPPED | OUTPATIENT
Start: 2019-12-05 | End: 2021-06-19 | Stop reason: SDUPTHER

## 2019-12-19 ENCOUNTER — TELEPHONE (OUTPATIENT)
Dept: INFUSION CENTER | Facility: HOSPITAL | Age: 69
End: 2019-12-19

## 2019-12-19 NOTE — TELEPHONE ENCOUNTER
Call transferred from [facility]  Patient questioning sequence of endoscopy followed by appt with Dr Dina Lopez and an appt for venofer directly after OV  Explained rationale, patient will get lab work prior to appt and Dr Dina Lopez will evaluate results of diagnostic testing to determine whether patient requires venofer infusion  Patient verbalizes understanding of treatment plan

## 2019-12-26 ENCOUNTER — TELEPHONE (OUTPATIENT)
Dept: GASTROENTEROLOGY | Facility: CLINIC | Age: 69
End: 2019-12-26

## 2019-12-26 NOTE — TELEPHONE ENCOUNTER
I called and spoke to pt to confirm procedure, he would like clarification on which meds to take prior to EGD on 1/2/20 with Dr Nely Burks  Pt asked for Dr Nely Burks' personal cell phone number, I did not provide it  Pt stated that we may call him on his cell phone for answer to his questions  I let him know that he can take Losartan prior to procedure, but he would like to hear it from someone else

## 2019-12-27 NOTE — TELEPHONE ENCOUNTER
History of GERD, iron deficiency anemia, colon polyps, pulmonary hypertension, obstructive sleep apnea but does not require O2 supplementation  Spoke to patient  Told him he should take his BP meds with a sip of water  And he would like to take his depression medication before the procedure  Advised it was ok  EGD scheduled for 1/2/2020

## 2019-12-31 ENCOUNTER — LAB (OUTPATIENT)
Dept: LAB | Age: 69
End: 2019-12-31
Payer: MEDICARE

## 2019-12-31 ENCOUNTER — ANESTHESIA EVENT (OUTPATIENT)
Dept: GASTROENTEROLOGY | Facility: HOSPITAL | Age: 69
End: 2019-12-31

## 2019-12-31 DIAGNOSIS — E78.00 HYPERCHOLESTEROLEMIA: ICD-10-CM

## 2019-12-31 DIAGNOSIS — C91.10 CLL (CHRONIC LYMPHOCYTIC LEUKEMIA) (HCC): ICD-10-CM

## 2019-12-31 DIAGNOSIS — D50.9 IRON DEFICIENCY ANEMIA, UNSPECIFIED IRON DEFICIENCY ANEMIA TYPE: ICD-10-CM

## 2019-12-31 LAB
ALBUMIN SERPL BCP-MCNC: 4.2 G/DL (ref 3.5–5)
ALP SERPL-CCNC: 80 U/L (ref 46–116)
ALT SERPL W P-5'-P-CCNC: 28 U/L (ref 12–78)
ANION GAP SERPL CALCULATED.3IONS-SCNC: 4 MMOL/L (ref 4–13)
AST SERPL W P-5'-P-CCNC: 18 U/L (ref 5–45)
BASOPHILS # BLD MANUAL: 0 THOUSAND/UL (ref 0–0.1)
BASOPHILS NFR MAR MANUAL: 0 % (ref 0–1)
BILIRUB SERPL-MCNC: 0.91 MG/DL (ref 0.2–1)
BUN SERPL-MCNC: 7 MG/DL (ref 5–25)
CALCIUM SERPL-MCNC: 8.7 MG/DL (ref 8.3–10.1)
CHLORIDE SERPL-SCNC: 99 MMOL/L (ref 100–108)
CHOLEST SERPL-MCNC: 124 MG/DL (ref 50–200)
CO2 SERPL-SCNC: 28 MMOL/L (ref 21–32)
CREAT SERPL-MCNC: 0.85 MG/DL (ref 0.6–1.3)
EOSINOPHIL # BLD MANUAL: 0.24 THOUSAND/UL (ref 0–0.4)
EOSINOPHIL NFR BLD MANUAL: 1 % (ref 0–6)
ERYTHROCYTE [DISTWIDTH] IN BLOOD BY AUTOMATED COUNT: 13.2 % (ref 11.6–15.1)
FERRITIN SERPL-MCNC: 42 NG/ML (ref 8–388)
GFR SERPL CREATININE-BSD FRML MDRD: 90 ML/MIN/1.73SQ M
GLUCOSE P FAST SERPL-MCNC: 101 MG/DL (ref 65–99)
HCT VFR BLD AUTO: 36.3 % (ref 36.5–49.3)
HDLC SERPL-MCNC: 50 MG/DL
HGB BLD-MCNC: 12.2 G/DL (ref 12–17)
LDLC SERPL CALC-MCNC: 63 MG/DL (ref 0–100)
LYMPHOCYTES # BLD AUTO: 21.15 THOUSAND/UL (ref 0.6–4.47)
LYMPHOCYTES # BLD AUTO: 89 % (ref 14–44)
MCH RBC QN AUTO: 32.8 PG (ref 26.8–34.3)
MCHC RBC AUTO-ENTMCNC: 33.6 G/DL (ref 31.4–37.4)
MCV RBC AUTO: 98 FL (ref 82–98)
MONOCYTES # BLD AUTO: 0.24 THOUSAND/UL (ref 0–1.22)
MONOCYTES NFR BLD: 1 % (ref 4–12)
NEUTROPHILS # BLD MANUAL: 1.66 THOUSAND/UL (ref 1.85–7.62)
NEUTS SEG NFR BLD AUTO: 7 % (ref 43–75)
NRBC BLD AUTO-RTO: 0 /100 WBCS
PLATELET # BLD AUTO: 145 THOUSANDS/UL (ref 149–390)
PLATELET BLD QL SMEAR: ABNORMAL
PMV BLD AUTO: 9.4 FL (ref 8.9–12.7)
POTASSIUM SERPL-SCNC: 4.2 MMOL/L (ref 3.5–5.3)
PROT SERPL-MCNC: 8.1 G/DL (ref 6.4–8.2)
RBC # BLD AUTO: 3.72 MILLION/UL (ref 3.88–5.62)
RBC MORPH BLD: NORMAL
SMUDGE CELLS BLD QL SMEAR: PRESENT
SODIUM SERPL-SCNC: 131 MMOL/L (ref 136–145)
TRIGL SERPL-MCNC: 53 MG/DL
VARIANT LYMPHS # BLD AUTO: 2 %
WBC # BLD AUTO: 23.76 THOUSAND/UL (ref 4.31–10.16)

## 2019-12-31 PROCEDURE — 36415 COLL VENOUS BLD VENIPUNCTURE: CPT

## 2019-12-31 PROCEDURE — 85027 COMPLETE CBC AUTOMATED: CPT

## 2019-12-31 PROCEDURE — 82728 ASSAY OF FERRITIN: CPT

## 2019-12-31 PROCEDURE — 85007 BL SMEAR W/DIFF WBC COUNT: CPT

## 2019-12-31 PROCEDURE — 80053 COMPREHEN METABOLIC PANEL: CPT

## 2019-12-31 PROCEDURE — 80061 LIPID PANEL: CPT

## 2019-12-31 RX ORDER — SODIUM CHLORIDE 9 MG/ML
20 INJECTION, SOLUTION INTRAVENOUS ONCE
Status: CANCELLED | OUTPATIENT
Start: 2020-01-06

## 2020-01-02 ENCOUNTER — HOSPITAL ENCOUNTER (OUTPATIENT)
Dept: GASTROENTEROLOGY | Facility: HOSPITAL | Age: 70
Setting detail: OUTPATIENT SURGERY
Discharge: HOME | End: 2020-01-02
Attending: INTERNAL MEDICINE | Admitting: INTERNAL MEDICINE
Payer: MEDICARE

## 2020-01-02 ENCOUNTER — ANESTHESIA (OUTPATIENT)
Dept: GASTROENTEROLOGY | Facility: HOSPITAL | Age: 70
End: 2020-01-02

## 2020-01-02 VITALS
TEMPERATURE: 98.8 F | HEART RATE: 57 BPM | OXYGEN SATURATION: 97 % | BODY MASS INDEX: 29.1 KG/M2 | RESPIRATION RATE: 16 BRPM | HEIGHT: 68 IN | WEIGHT: 192 LBS | SYSTOLIC BLOOD PRESSURE: 107 MMHG | DIASTOLIC BLOOD PRESSURE: 56 MMHG

## 2020-01-02 DIAGNOSIS — K31.819 GAVE (GASTRIC ANTRAL VASCULAR ECTASIA): ICD-10-CM

## 2020-01-02 PROCEDURE — 43251 EGD REMOVE LESION SNARE: CPT | Performed by: INTERNAL MEDICINE

## 2020-01-02 PROCEDURE — 88342 IMHCHEM/IMCYTCHM 1ST ANTB: CPT | Performed by: PATHOLOGY

## 2020-01-02 PROCEDURE — 43270 EGD LESION ABLATION: CPT | Performed by: INTERNAL MEDICINE

## 2020-01-02 PROCEDURE — 88305 TISSUE EXAM BY PATHOLOGIST: CPT | Performed by: PATHOLOGY

## 2020-01-02 PROCEDURE — 88341 IMHCHEM/IMCYTCHM EA ADD ANTB: CPT | Performed by: PATHOLOGY

## 2020-01-02 RX ORDER — SODIUM CHLORIDE 9 MG/ML
125 INJECTION, SOLUTION INTRAVENOUS CONTINUOUS
Status: DISCONTINUED | OUTPATIENT
Start: 2020-01-02 | End: 2020-01-06 | Stop reason: HOSPADM

## 2020-01-02 RX ORDER — PROPOFOL 10 MG/ML
INJECTION, EMULSION INTRAVENOUS AS NEEDED
Status: DISCONTINUED | OUTPATIENT
Start: 2020-01-02 | End: 2020-01-02 | Stop reason: SURG

## 2020-01-02 RX ADMIN — SODIUM CHLORIDE 125 ML/HR: 0.9 INJECTION, SOLUTION INTRAVENOUS at 13:30

## 2020-01-02 RX ADMIN — PROPOFOL 50 MG: 10 INJECTION, EMULSION INTRAVENOUS at 14:46

## 2020-01-02 RX ADMIN — PROPOFOL 50 MG: 10 INJECTION, EMULSION INTRAVENOUS at 14:35

## 2020-01-02 RX ADMIN — PROPOFOL 50 MG: 10 INJECTION, EMULSION INTRAVENOUS at 14:31

## 2020-01-02 RX ADMIN — PROPOFOL 100 MG: 10 INJECTION, EMULSION INTRAVENOUS at 14:29

## 2020-01-02 RX ADMIN — PROPOFOL 50 MG: 10 INJECTION, EMULSION INTRAVENOUS at 14:49

## 2020-01-02 RX ADMIN — PROPOFOL 50 MG: 10 INJECTION, EMULSION INTRAVENOUS at 14:33

## 2020-01-02 RX ADMIN — PROPOFOL 50 MG: 10 INJECTION, EMULSION INTRAVENOUS at 14:37

## 2020-01-02 RX ADMIN — PROPOFOL 50 MG: 10 INJECTION, EMULSION INTRAVENOUS at 14:43

## 2020-01-02 NOTE — ANESTHESIA PREPROCEDURE EVALUATION
Review of Systems/Medical History  Patient summary reviewed  Chart reviewed  No history of anesthetic complications     Cardiovascular  Hyperlipidemia, Hypertension controlled,   Pulmonary hypertension (mild, per pmd note) Pulmonary  Smoker ex-smoker  , Sleep apnea ,        GI/Hepatic    GERD ,  Hiatal hernia,             Endo/Other    Comment: ETOH daily    GYN       Hematology  Anemia iron deficiency anemia,    Comment: CLL Musculoskeletal    Arthritis     Neurology      Comment: Concussion 2016 Psychology   Anxiety, Depression ,              Physical Exam    Airway    Mallampati score: II  TM Distance: >3 FB  Neck ROM: full     Dental   No notable dental hx     Cardiovascular  Rhythm: regular, Rate: normal, Cardiovascular exam normal    Pulmonary  Pulmonary exam normal Breath sounds clear to auscultation,     Other Findings        Anesthesia Plan  ASA Score- 3     Anesthesia Type- IV sedation with anesthesia with ASA Monitors  Additional Monitors:   Airway Plan:         Plan Factors-Patient not instructed to abstain from smoking on day of procedure  Patient did not smoke on day of surgery  Induction- intravenous  Postoperative Plan-     Informed Consent- Anesthetic plan and risks discussed with patient  I personally reviewed this patient with the CRNA  Discussed and agreed on the Anesthesia Plan with the CRNA  Reji Stoll

## 2020-01-02 NOTE — DISCHARGE INSTRUCTIONS
Gastric Polyps   WHAT YOU NEED TO KNOW:   Gastric polyps are growths that form in the lining of your stomach  They are not cancerous, but certain types of polyps can change into cancer  DISCHARGE INSTRUCTIONS:   Follow up with your healthcare provider as directed: You may need more tests or procedures  Write down any questions so you remember to ask them at your visits  Medicines:   · Medicines may  be given if you have an infection caused by H  pylori bacteria  · Take your medicine as directed  Contact your healthcare provider if you think your medicine is not helping or if you have side effects  Tell him or her if you are allergic to any medicine  Keep a list of the medicines, vitamins, and herbs you take  Include the amounts, and when and why you take them  Bring the list or the pill bottles to follow-up visits  Carry your medicine list with you in case of an emergency  Seek care immediately if:   · You have blood in your vomit  · You have dark bowel movements  · You have severe pain in your abdomen that does not go away after you take medicine  Contact your healthcare provider if:   · You have indigestion that does not go away with treatment  · You vomit after meals  · You have questions or concerns about your condition or care  © 2017 [address] Information is for End User's use only and may not be sold, redistributed or otherwise used for commercial purposes  All illustrations and images included in CareNotes® are the copyrighted property of [employer]  or Maycol Vaughn  The above information is an  only  It is not intended as medical advice for individual conditions or treatments  Talk to your doctor, nurse or pharmacist before following any medical regimen to see if it is safe and effective for you

## 2020-01-06 ENCOUNTER — OFFICE VISIT (OUTPATIENT)
Dept: HEMATOLOGY ONCOLOGY | Facility: CLINIC | Age: 70
End: 2020-01-06
Payer: MEDICARE

## 2020-01-06 ENCOUNTER — HOSPITAL ENCOUNTER (OUTPATIENT)
Dept: INFUSION CENTER | Facility: CLINIC | Age: 70
Discharge: HOME | End: 2020-01-06
Payer: MEDICARE

## 2020-01-06 VITALS
HEIGHT: 66 IN | HEART RATE: 67 BPM | OXYGEN SATURATION: 94 % | TEMPERATURE: 98.6 F | WEIGHT: 195 LBS | BODY MASS INDEX: 31.34 KG/M2 | RESPIRATION RATE: 18 BRPM | SYSTOLIC BLOOD PRESSURE: 132 MMHG | DIASTOLIC BLOOD PRESSURE: 64 MMHG

## 2020-01-06 VITALS
RESPIRATION RATE: 18 BRPM | SYSTOLIC BLOOD PRESSURE: 132 MMHG | TEMPERATURE: 98.6 F | HEART RATE: 67 BPM | DIASTOLIC BLOOD PRESSURE: 64 MMHG

## 2020-01-06 DIAGNOSIS — C91.10 CLL (CHRONIC LYMPHOCYTIC LEUKEMIA) (HCC): Primary | Chronic | ICD-10-CM

## 2020-01-06 DIAGNOSIS — D50.9 IRON DEFICIENCY ANEMIA, UNSPECIFIED IRON DEFICIENCY ANEMIA TYPE: ICD-10-CM

## 2020-01-06 DIAGNOSIS — D50.9 IRON DEFICIENCY ANEMIA, UNSPECIFIED IRON DEFICIENCY ANEMIA TYPE: Primary | ICD-10-CM

## 2020-01-06 PROCEDURE — 96374 THER/PROPH/DIAG INJ IV PUSH: CPT

## 2020-01-06 PROCEDURE — 99214 OFFICE O/P EST MOD 30 MIN: CPT | Performed by: INTERNAL MEDICINE

## 2020-01-06 RX ORDER — SODIUM CHLORIDE 9 MG/ML
20 INJECTION, SOLUTION INTRAVENOUS ONCE
Status: COMPLETED | OUTPATIENT
Start: 2020-01-06 | End: 2020-01-06

## 2020-01-06 RX ORDER — SODIUM CHLORIDE 9 MG/ML
20 INJECTION, SOLUTION INTRAVENOUS ONCE
Status: CANCELLED | OUTPATIENT
Start: 2020-06-22

## 2020-01-06 RX ADMIN — IRON SUCROSE 200 MG: 20 INJECTION, SOLUTION INTRAVENOUS at 11:31

## 2020-01-06 RX ADMIN — SODIUM CHLORIDE 20 ML/HR: 0.9 INJECTION, SOLUTION INTRAVENOUS at 11:31

## 2020-01-06 NOTE — PROGRESS NOTES
Hematology / Oncology Outpatient Follow Up Note    Kortney Russellpool 81 y o  male VRI:[id number] SEN:[id number]         Date:  1/6/2020    Assessment / Plan:  A 81 year old gentleman with chronic lymphocytic leukemia diagnosed in March 2010  He has lymphocytosis with no lymphadenopathy  He is currently on watchful waiting  Mauro Morris previously had iron deficiency anemia which was corrected by IV iron  In early 2019, he became anemic again  He is up to date for colonoscopy  His recent EGD showed several polyp as well as vascular ectasia  He is currently on venofer every 6 months with no anemia  He has very stable lymphocytosis  He remain to be asymptomatic from hematology standpoint  Therefore, I recommended him to continue with watchful waiting for CLL as well as venofer every 6 months for iron deficiency anemia  He is in agreement with my recommendation  I will see him again in 6 months with CBC and ferritin                                      Subjective:      HPI:             Interval History:  A 81 year old gentleman with chronic lymphocytic leukemia diagnosed in March 2010  He has been on watchful waiting because he has absolutely no symptoms from hematology standpoint  Over the last few years, he had slowly progressive microcytic anemia  He was found to have iron deficiency   He was previously treated with IV iron with improvement of anemia   His last colonoscopy was in October 2018 which was negative  In early 2019, he developed iron deficiency anemia again  Therefore, he underwent weekly venofer x5, followed by maintenance Venofer every 6 months  He recently underwent EGD which showed several gastric polyp as well as vascular ectasia  He presents today for routine follow-up  He has absolutely no new complaint  He denied fever, chills or night sweats  His weight is stable  He has stable lymphocytosis  Has no complaint of pain    His performance status is normal                                  Objective:      Primary Diagnosis:     1  Chronic lymphocytic leukemia  Diagnosed in March 2010  CD-5, CD-23 positive  Kappa light chain positive  CD-38 negative  ZAP-70 positive disease   Diagnosed in 2010    2  Iron deficiency anemia      Cancer Staging:  No matching staging information was found for the patient         Previous Hematologic/ Oncologic Treatment:            Current Hematologic/ Oncologic Treatment:       Watchful waiting for CLL  Venofer every 6 months      Disease Status:      No evidence of progressive disease      Test Results:     Pathology:           Radiology:           Laboratory:     See below      Physical Exam:        General Appearance:    Alert, oriented          Eyes:    PERRL   Ears:    Normal external ear canals, both ears   Nose:   Nares normal, septum midline   Throat:   Mucosa moist  Pharynx without injection  Neck:   Supple         Lungs:     Clear to auscultation bilaterally   Chest Wall:    No tenderness or deformity    Heart:    Regular rate and rhythm         Abdomen:     Soft, non-tender, bowel sounds +, no organomegaly               Extremities:   Extremities no cyanosis or edema         Skin:   no rash or icterus  Lymph nodes:   Cervical, supraclavicular, and axillary nodes normal   Neurologic:   CNII-XII intact, normal strength, sensation and reflexes     Throughout           ROS: Review of Systems   All other systems reviewed and are negative  Imaging: No results found        Labs:   Lab Results   Component Value Date    WBC 23 76 (H) 12/31/2019    HGB 12 2 12/31/2019    HCT 36 3 (L) 12/31/2019    MCV 98 12/31/2019     (L) 12/31/2019     Lab Results   Component Value Date     (L) 07/20/2015    K 4 2 12/31/2019    CL 99 (L) 12/31/2019    CO2 28 12/31/2019    ANIONGAP 9 07/20/2015    BUN 7 12/31/2019    CREATININE 0 85 12/31/2019    GLUCOSE 119 07/20/2015    GLUF 101 (H) 12/31/2019    CALCIUM 8 7 12/31/2019    AST 18 12/31/2019    ALT 28 12/31/2019    ALKPHOS 80 12/31/2019    PROT 8 0 07/20/2015    BILITOT 0 47 07/20/2015    EGFR 90 12/31/2019         Lab Results   Component Value Date     02/18/2019       Lab Results   Component Value Date    PSA 0 2 02/18/2019    PSA 0 1 02/12/2018    PSA 0 2 02/15/2017       Lab Results   Component Value Date    FERRITIN 42 12/31/2019         Lab Results   Component Value Date    FOLATE 11 2 02/23/2017         Current Medications: Reviewed  Allergies: Reviewed  PMH/FH/SH:  Reviewed      Vital Sign:    Body surface area is 1 98 meters squared      Wt Readings from Last 3 Encounters:   01/06/20 88 5 kg (195 lb)   01/02/20 87 1 kg (192 lb)   11/23/19 87 3 kg (192 lb 6 4 oz)        Temp Readings from Last 3 Encounters:   01/06/20 98 6 °F (37 °C) (Tympanic Core)   01/02/20 98 8 °F (37 1 °C) (Temporal)   11/23/19 98 7 °F (37 1 °C) (Tympanic)        BP Readings from Last 3 Encounters:   01/06/20 132/64   01/02/20 107/56   11/23/19 140/66         Pulse Readings from Last 3 Encounters:   01/06/20 67   01/02/20 57   11/23/19 55     @LASTSAO2(3)@

## 2020-01-06 NOTE — PLAN OF CARE
Problem: Potential for Falls  Goal: Patient will remain free of falls  Description  INTERVENTIONS:  - Assess patient frequently for physical needs  -  Identify cognitive and physical deficits and behaviors that affect risk of falls    -  [place] fall precautions as indicated by assessment   - Educate patient/family on patient safety including physical limitations  - Instruct patient to call for assistance with activity based on assessment  - Modify environment to reduce risk of injury  - Consider OT/PT consult to assist with strengthening/mobility  Outcome: Progressing

## 2020-01-10 ENCOUNTER — OFFICE VISIT (OUTPATIENT)
Dept: FAMILY MEDICINE CLINIC | Facility: CLINIC | Age: 70
End: 2020-01-10
Payer: MEDICARE

## 2020-01-10 ENCOUNTER — HOSPITAL ENCOUNTER (OUTPATIENT)
Dept: CT IMAGING | Facility: HOSPITAL | Age: 70
Discharge: HOME | End: 2020-01-10
Payer: MEDICARE

## 2020-01-10 VITALS
SYSTOLIC BLOOD PRESSURE: 120 MMHG | TEMPERATURE: 98.8 F | HEIGHT: 66 IN | DIASTOLIC BLOOD PRESSURE: 70 MMHG | OXYGEN SATURATION: 96 % | WEIGHT: 194.13 LBS | BODY MASS INDEX: 31.2 KG/M2

## 2020-01-10 DIAGNOSIS — R10.32 LLQ PAIN: ICD-10-CM

## 2020-01-10 DIAGNOSIS — K57.92 DIVERTICULITIS: Primary | ICD-10-CM

## 2020-01-10 DIAGNOSIS — R10.32 LLQ PAIN: Primary | ICD-10-CM

## 2020-01-10 LAB
SL AMB  POCT GLUCOSE, UA: NEGATIVE
SL AMB LEUKOCYTE ESTERASE,UA: NEGATIVE
SL AMB POCT BILIRUBIN,UA: NEGATIVE
SL AMB POCT BLOOD,UA: ABNORMAL
SL AMB POCT CLARITY,UA: CLEAR
SL AMB POCT COLOR,UA: YELLOW
SL AMB POCT KETONES,UA: ABNORMAL
SL AMB POCT NITRITE,UA: NEGATIVE
SL AMB POCT PH,UA: 5
SL AMB POCT SPECIFIC GRAVITY,UA: 1
SL AMB POCT URINE PROTEIN: NEGATIVE
SL AMB POCT UROBILINOGEN: 0.2

## 2020-01-10 PROCEDURE — 87086 URINE CULTURE/COLONY COUNT: CPT | Performed by: PHYSICIAN ASSISTANT

## 2020-01-10 PROCEDURE — 99214 OFFICE O/P EST MOD 30 MIN: CPT | Performed by: PHYSICIAN ASSISTANT

## 2020-01-10 PROCEDURE — 81002 URINALYSIS NONAUTO W/O SCOPE: CPT | Performed by: PHYSICIAN ASSISTANT

## 2020-01-10 PROCEDURE — 74177 CT ABD & PELVIS W/CONTRAST: CPT

## 2020-01-10 RX ORDER — AMOXICILLIN AND CLAVULANATE POTASSIUM 875; 125 MG/1; MG/1
1 TABLET, FILM COATED ORAL EVERY 12 HOURS SCHEDULED
Qty: 20 TABLET | Refills: 0 | Status: SHIPPED | OUTPATIENT
Start: 2020-01-10 | End: 2020-01-20

## 2020-01-10 RX ADMIN — IOHEXOL 100 ML: 350 INJECTION, SOLUTION INTRAVENOUS at 14:19

## 2020-01-10 NOTE — PROGRESS NOTES
FAMILY PRACTICE ACUTE OFFICE VISIT  [facility]       NAME: Jose Willams  AGE: 81 y o  SEX: male       : 1950        MRN: [id number]    DATE: 1/10/2020  TIME: 11:45 AM    Assessment and Plan     Problem List Items Addressed This Visit     None      Visit Diagnoses     LLQ pain    -  Primary    Relevant Orders    CT abdomen pelvis w contrast        Patient Instructions   Patient has possible diverticulitis  Patient will go for STAT CT of the abdomen and pelvis to confirm diagnosis and check for abscess  Will plan on initiating Augmentin if CT is positive  Patient should also follow diverticulitis  If positive, he should also follow-up with GI given his multiple episodes recently (in November and again now less than 2 months later)  Diverticulitis Diet   WHAT YOU NEED TO KNOW:   A diverticulitis diet includes foods that allow your intestines to rest while you have diverticulitis  Diverticulitis is a condition that causes small pockets along your intestine called diverticula to become inflamed or infected  This is caused by hard bowel movement, food, or bacteria that get stuck in the pockets  DISCHARGE INSTRUCTIONS:   Foods you can eat while you have diverticulitis:   · A clear liquid diet may be recommended for 2 to 3 days  A clear liquid diet is made up of clear liquids and foods that are liquid at room temperature  Your healthcare provider will tell you when you can start eating solid foods   Examples of clear liquids include the following:     ¨ Water and clear juices (such as apple, cranberry, or grape), strained citrus juices or fruit punch    ¨ Coffee or tea (without cream or milk)    ¨ Clear sports drinks or soft drinks, such as ginger ale, lemon-lime soda, or club soda (no cola or root beer)    ¨ Clear broth, bouillon, or consommé    ¨ Plain popsicles (no popsicles with pureed fruit or fiber)    ¨ Flavored gelatin without fruit    · A low-fiber diet may be recommended until your symptoms improve  Your healthcare provider will tell you when you can slowly add high-fiber foods back into your diet  ¨ Cream of wheat and finely ground grits    ¨ White bread, white pasta, and white rice    ¨ Canned and well-cooked fruit without skins or seeds, and juice without pulp    ¨ Canned and well-cooked vegetables without skins or seeds, and vegetable juice    ¨ Cow's milk, lactose-free milk, soy milk, and rice milk    ¨ Yogurt, cottage cheese, and sherbet    ¨ Eggs, poultry (such as chicken and turkey), fish, and tender, ground, well-cooked beef     ¨ Tofu and smooth nut butters, such as peanut butter    ¨ Broth and strained soups made of low-fiber foods  Foods you should avoid while you have diverticulitis:  Avoid foods that are high in fiber while you have symptoms of diverticulitis  Examples of high-fiber foods include the following:  · Whole grains and breads, and cereals made with whole grains    · Dried fruit, fresh fruit with skin, and fruit pulp    · Raw vegetables    · Cooked greens, such as spinach    · Tough meat and meat with gristle    · Legumes, such as sanchez beans and lentils  Contact your healthcare provider if:   · Your symptoms do not get better, or they get worse  · You have questions about the foods you should eat  · You have questions or concerns about your condition or care  © 2017 [address] [employer] Information is for End User's use only and may not be sold, redistributed or otherwise used for commercial purposes  All illustrations and images included in CareNotes® are the copyrighted property of [employer]  or Maycol Vaughn  The above information is an  only  It is not intended as medical advice for individual conditions or treatments  Talk to your doctor, nurse or pharmacist before following any medical regimen to see if it is safe and effective for you              Chief Complaint Chief Complaint   Patient presents with    Abdominal Pain     since last night        History of Present Illness   Macarena Zhang is a 81y o -year-old male who presents for possible diverticulitis  Notes that he has had hematuria in the urine chronically and already had work-up with Urology including cystoscopy    Abdominal Pain   This is a new (reports that this feels like diverticulitis which he has had about 4 times in the past over the last 20 years - last episode was about 11/23/19 - about 6 weeks ago) problem  The current episode started yesterday  The onset quality is gradual (began about 9 pm last night)  The problem occurs intermittently (feels with walking around but okay if sitting)  The pain is located in the LLQ  Quality: stabbing  The abdominal pain does not radiate  Associated symptoms include belching  Pertinent negatives include no constipation, diarrhea, dysuria, fever, flatus, frequency, hematochezia, hematuria, melena, nausea or vomiting  The pain is aggravated by movement  Treatments tried: Tylenol  The treatment provided mild relief  has had diverticulitis in the past and had left over levofloxacin from November so he took one         Review of Systems   Review of Systems   Constitutional: Negative for fever  Gastrointestinal: Positive for abdominal pain  Negative for constipation, diarrhea, flatus, hematochezia, melena, nausea and vomiting  Genitourinary: Negative for dysuria, frequency and hematuria         Active Problem List     Patient Active Problem List   Diagnosis    Essential hypertension    CLL (chronic lymphocytic leukemia) ([address]    Primary osteoarthritis of right hip    Allergic rhinitis due to pollen    Erectile dysfunction of non-organic origin    Esophageal reflux    Pulmonary hypertension ([address]    Spinal stenosis of lumbar region    Enlarged prostate without lower urinary tract symptoms (luts)    Iron deficiency anemia    Insomnia    Arthritis of right hip    Abnormal EKG    DDD (degenerative disc disease), cervical    DIXIE not currently treated    Hypercholesterolemia    Mild depression (HCC)    History of colon polyps         Social History:  Social History     Socioeconomic History    Marital status: /Civil Union     Spouse name: Not on file    Number of children: Not on file    Years of education: Not on file    Highest education level: Not on file   Occupational History    Not on file   Social Needs    Financial resource strain: Not on file    Food insecurity:     Worry: Not on file     Inability: Not on file    Transportation needs:     Medical: Not on file     Non-medical: Not on file   Tobacco Use    Smoking status: Former Smoker     Packs/day: 1 00     Years: 15 00     Pack years: 15      Types: Cigarettes     Start date:      Last attempt to quit: 1980     Years since quittin 3    Smokeless tobacco: Never Used   Substance and Sexual Activity    Alcohol use:  Yes     Alcohol/week: 20 0 standard drinks     Types: 20 Cans of beer per week     Frequency: 4 or more times a week     Drinks per session: 3 or 4    Drug use: Not Currently     Types: Marijuana    Sexual activity: Not on file   Lifestyle    Physical activity:     Days per week: Not on file     Minutes per session: Not on file    Stress: Not on file   Relationships    Social connections:     Talks on phone: Not on file     Gets together: Not on file     Attends [employer] service: Not on file     Active member of club or organization: Not on file     Attends meetings of clubs or organizations: Not on file     Relationship status: Not on file    Intimate partner violence:     Fear of current or ex partner: Not on file     Emotionally abused: Not on file     Physically abused: Not on file     Forced sexual activity: Not on file   Other Topics Concern    Not on file   Social History Narrative    Not on file       Objective     Vitals:    01/10/20 1114   BP: 120/70   BP Location: Left arm   Patient Position: Sitting   Cuff Size: Standard   Temp: 98 8 °F (37 1 °C)   SpO2: 96%   Weight: 88 1 kg (194 lb 2 oz)   Height: 5' 6" (1 676 m)     Wt Readings from Last 3 Encounters:   01/10/20 88 1 kg (194 lb 2 oz)   01/06/20 88 5 kg (195 lb)   01/02/20 87 1 kg (192 lb)       Physical Exam   Constitutional: He is oriented to person, place, and time  He appears well-developed and well-nourished  He does not appear ill  Pulmonary/Chest: Effort normal and breath sounds normal  He has no wheezes  He has no rhonchi  He has no rales  Abdominal: Normal appearance and bowel sounds are normal  He exhibits no distension  There is tenderness in the left lower quadrant  There is no rigidity, no rebound, no guarding and no CVA tenderness  Neurological: He is alert and oriented to person, place, and time  Psychiatric: He has a normal mood and affect  Vitals reviewed  Pertinent Laboratory/Diagnostic Studies:  Results for orders placed or performed during the hospital encounter of 01/02/20   Tissue Exam   Result Value Ref Range    Case Report       Surgical Pathology Report                         Case: [id number]                                   Authorizing Provider:  Johanny Vanessa MD           Collected:           01/02/2020 1449              Ordering Location:     [facility]        Received:            01/02/2020 1401 [address] [facility]                                                          Pathologist:           Maricruz Wooten MD                                                              Specimen:    Stomach, gastric polyps                                                                    Addendum       -Pankeratin stain MCK is used in evaluation and highlighted surface epithelium  Stromal cells are negative for staining  Appropriate positive and negative controls reacted appropriately      Final Diagnosis       A   Stomach, gastric polyps :  -Gastric type mucosa with edema,  Acute & chronic inflammation of lamina propria, surface ulceration and  irregular elongated foveolar epithelium in a corkscrew like appearance , consistent with  hyperplastic polyp  -No evidence of malignancy  Note       Interpretation performed at [facility], [address] [facility] [address] [facility]  H  Pylori , MCK stains will be ordered and the result will be issued in the supplemental report      Additional Information       All controls performed with the immunohistochemical stains reported above reacted appropriately  These tests were developed and their performance characteristics determined by [facility] or [employer]  They may not be cleared or approved by the [place]  Food and Drug Administration  The FDA has determined that such clearance or approval is not necessary  These tests are used for clinical purposes  They should not be regarded as investigational or for research  This laboratory has been approved by [place], designated as a high-complexity laboratory and is qualified to perform these tests  Gross Description       A  The specimen is received in formalin, labeled with the patient's name and hospital number, and is designated "stomach gastric polyp  The specimen consists of 2 polypoid tissue fragments measuring 1 8 x 1 1 x 0 8 cm and 1 5 x 0 8 x 0 6 cm  Both tissue fragments are inked blue at margin of resection and sectioned revealing tan-pink grossly unremarkable cut surfaces  The specimen is entirely submitted in 3 screened cassettes  1:  Polypoid tissue fragment measuring 1 5 x 0 8 x 0 6 cm   2-3:  Polypoid tissue fragment measuring 1 8 x 1 1 x 0 8 cm  Note: The estimated total formalin fixation time based upon information provided by the submitting clinician and the standard processing schedule is under 72 hours      Sebastian Praamalia            Orders Placed This Encounter   Procedures    CT abdomen pelvis w contrast       ALLERGIES:  Allergies   Allergen Reactions    Codeine Other (See Comments)     agitated    Bactrim [Sulfamethoxazole-Trimethoprim] GI Intolerance       Current Medications     Current Outpatient Medications   Medication Sig Dispense Refill    amLODIPine (NORVASC) 5 mg tablet Take 1 tablet (5 mg total) by mouth every evening 90 tablet 3    aspirin 81 MG tablet Take 1 tablet by mouth daily      atorvastatin (LIPITOR) 20 mg tablet Take 1 tablet (20 mg total) by mouth daily 90 tablet 3    carvedilol (COREG) 12 5 mg tablet Take 1 tablet (12 5 mg total) by mouth 2 (two) times a day with meals 180 tablet 3    fluticasone (FLONASE) 50 mcg/act nasal spray 2 sprays into each nostril daily as needed for allergies 3 Bottle 3    losartan (COZAAR) 100 MG tablet Take 1 tablet (100 mg total) by mouth daily 90 tablet 3    Milk Thistle 1000 MG CAPS Take 250 mg by mouth       minoxidil (LONITEN) 10 mg tablet TAKE 1 TABLET BY MOUTH ONCE DAILY 90 tablet 3    montelukast (SINGULAIR) 10 mg tablet Take 1 tablet (10 mg total) by mouth daily 90 tablet 3    naproxen (EC NAPROSYN) 500 MG EC tablet Take 1 tablet (500 mg total) by mouth 2 (two) times a day with meals 60 tablet 2    Omega-3 Fatty Acids (FISH OIL) 1,000 mg Take by mouth daily      omeprazole (PriLOSEC) 40 MG capsule Take 1 capsule (40 mg total) by mouth daily 90 capsule 3    sertraline (ZOLOFT) 50 mg tablet Take 1 tablet (50 mg total) by mouth daily 90 tablet 3    tamsulosin (FLOMAX) 0 4 mg TAKE ONE CAPSULE BY MOUTH EVERY DAY WITH DINNER 90 capsule 3    zolpidem (AMBIEN) 10 mg tablet TAKE ONE TABLET BY MOUTH AT BEDTIME AS NEEDED FOR SLEEP 90 tablet 0     No current facility-administered medications for this visit            Benji Morales PA-C  1/10/2020 11:45 AM  [facility]

## 2020-01-10 NOTE — PATIENT INSTRUCTIONS
Patient has possible diverticulitis  Patient will go for STAT CT of the abdomen and pelvis to confirm diagnosis and check for abscess  Will plan on initiating Augmentin if CT is positive  Patient should also follow diverticulitis  If positive, he should also follow-up with GI given his multiple episodes recently (in November and again now less than 2 months later)  Diverticulitis Diet   WHAT YOU NEED TO KNOW:   A diverticulitis diet includes foods that allow your intestines to rest while you have diverticulitis  Diverticulitis is a condition that causes small pockets along your intestine called diverticula to become inflamed or infected  This is caused by hard bowel movement, food, or bacteria that get stuck in the pockets  DISCHARGE INSTRUCTIONS:   Foods you can eat while you have diverticulitis:   · A clear liquid diet may be recommended for 2 to 3 days  A clear liquid diet is made up of clear liquids and foods that are liquid at room temperature  Your healthcare provider will tell you when you can start eating solid foods  Examples of clear liquids include the following:     ¨ Water and clear juices (such as apple, cranberry, or grape), strained citrus juices or fruit punch    ¨ Coffee or tea (without cream or milk)    ¨ Clear sports drinks or soft drinks, such as ginger ale, lemon-lime soda, or club soda (no cola or root beer)    ¨ Clear broth, bouillon, or consommé    ¨ Plain popsicles (no popsicles with pureed fruit or fiber)    ¨ Flavored gelatin without fruit    · A low-fiber diet may be recommended until your symptoms improve  Your healthcare provider will tell you when you can slowly add high-fiber foods back into your diet      ¨ Cream of wheat and finely ground grits    ¨ White bread, white pasta, and white rice    ¨ Canned and well-cooked fruit without skins or seeds, and juice without pulp    ¨ Canned and well-cooked vegetables without skins or seeds, and vegetable juice    ¨ Cow's milk, lactose-free milk, soy milk, and rice milk    ¨ Yogurt, cottage cheese, and sherbet    ¨ Eggs, poultry (such as chicken and turkey), fish, and tender, ground, well-cooked beef     ¨ Tofu and smooth nut butters, such as peanut butter    ¨ Broth and strained soups made of low-fiber foods  Foods you should avoid while you have diverticulitis:  Avoid foods that are high in fiber while you have symptoms of diverticulitis  Examples of high-fiber foods include the following:  · Whole grains and breads, and cereals made with whole grains    · Dried fruit, fresh fruit with skin, and fruit pulp    · Raw vegetables    · Cooked greens, such as spinach    · Tough meat and meat with gristle    · Legumes, such as sanchez beans and lentils  Contact your healthcare provider if:   · Your symptoms do not get better, or they get worse  · You have questions about the foods you should eat  · You have questions or concerns about your condition or care  © 2017 [employer] Information is for End User's use only and may not be sold, redistributed or otherwise used for commercial purposes  All illustrations and images included in CareNotes® are the copyrighted property of [employer]  or Maycol Vaughn  The above information is an  only  It is not intended as medical advice for individual conditions or treatments  Talk to your doctor, nurse or pharmacist before following any medical regimen to see if it is safe and effective for you

## 2020-01-11 LAB — BACTERIA UR CULT: NORMAL

## 2020-01-13 ENCOUNTER — OFFICE VISIT (OUTPATIENT)
Dept: FAMILY MEDICINE CLINIC | Facility: CLINIC | Age: 70
End: 2020-01-13
Payer: MEDICARE

## 2020-01-13 VITALS
HEIGHT: 66 IN | TEMPERATURE: 97.3 F | WEIGHT: 193.4 LBS | BODY MASS INDEX: 31.08 KG/M2 | DIASTOLIC BLOOD PRESSURE: 80 MMHG | SYSTOLIC BLOOD PRESSURE: 162 MMHG | HEART RATE: 61 BPM | OXYGEN SATURATION: 95 %

## 2020-01-13 DIAGNOSIS — F32.A MILD DEPRESSION: ICD-10-CM

## 2020-01-13 DIAGNOSIS — I27.20 PULMONARY HYPERTENSION (HCC): ICD-10-CM

## 2020-01-13 DIAGNOSIS — R16.1 SPLENOMEGALY: ICD-10-CM

## 2020-01-13 DIAGNOSIS — C91.10 CLL (CHRONIC LYMPHOCYTIC LEUKEMIA) (HCC): Primary | Chronic | ICD-10-CM

## 2020-01-13 DIAGNOSIS — K57.92 DIVERTICULITIS: ICD-10-CM

## 2020-01-13 DIAGNOSIS — D50.9 IRON DEFICIENCY ANEMIA, UNSPECIFIED IRON DEFICIENCY ANEMIA TYPE: ICD-10-CM

## 2020-01-13 DIAGNOSIS — E78.00 HYPERCHOLESTEROLEMIA: ICD-10-CM

## 2020-01-13 DIAGNOSIS — I10 ESSENTIAL HYPERTENSION: ICD-10-CM

## 2020-01-13 DIAGNOSIS — K21.9 GASTROESOPHAGEAL REFLUX DISEASE WITHOUT ESOPHAGITIS: ICD-10-CM

## 2020-01-13 DIAGNOSIS — N40.0 ENLARGED PROSTATE WITHOUT LOWER URINARY TRACT SYMPTOMS (LUTS): ICD-10-CM

## 2020-01-13 PROCEDURE — 99214 OFFICE O/P EST MOD 30 MIN: CPT | Performed by: FAMILY MEDICINE

## 2020-01-13 NOTE — PROGRESS NOTES
Assessment/Plan:       Problem List Items Addressed This Visit        Digestive    Esophageal reflux       Continue on PPI            Cardiovascular and Mediastinum    Essential hypertension       Blood pressure elevated today  I did encourage him to check some blood pressure readings at home and notify me if he is running consistently above 140/90  He was stressed today regarding his recent CT scan as well as recent diverticulitis  Home blood pressures have been running quite good lately  He is coming back to see me in March and will bring his blood pressure cuff at that time         Pulmonary hypertension ([facility] [address] )       He remains clinically stable            Other    CLL (chronic lymphocytic leukemia) ([address] ) - Primary      Continue close Oncology follow-up  Enlarged prostate without lower urinary tract symptoms (luts)       Confirmed on recent CT scan  He also has some known bladder diverticuli  I am going to arrange for Urology follow-up  Relevant Orders    Ambulatory referral to Urology    Iron deficiency anemia       Continue with routine hematology follow-up  He is status post iron infusions  Hypercholesterolemia    Mild depression ([address] )       Stable with sertraline  He has a bit of mild dysphoric mood in light of his recent diverticulitis  I will see him back here in March  Diverticulitis       Finish Augmentin  He certainly is improving clinically  He is following with GI  Routinely for his reflux and we can certainly get their opinion if he continues to have recurrent diverticulitis  In regards to any further steps  Splenomegaly      He has some mild splenomegaly noted on his most recent CT scan done for diverticulitis  This was not noted on previous CT scan  Consider repeat imaging with an ultrasound in about 6 months  This could certainly be coming from his history of chronic alcohol use as well as from his history of CLL    At any rate, it was mild and does not need any current intervention  I would, as always, encouraged him to moderate or stop drinking altogether  We will continue to monitor his CLL through Oncology  Subjective:      Patient ID: Radha Magdaleno is a 81 y o  male  HPI patient presents today for follow-up for his chronic health issues as well as his recent episode of acute diverticulitis  He notes his left lower abdominal pain is improving  He continues on Augmentin  He denies any fever or chills  CT abdomen did reveal diverticulitis as well as some splenomegaly and enlarged bladder with bladder diverticulum  He has a known history of CLL  He denies any unintentional weight loss or night sweats  The patient presents today for a hypertension follow-up  Patient remains compliant with medications and denies any chest pain, shortness of breath, palpitations, lightheadedness or diaphoresis  He notes his mood has been a bit down lately in light of his diverticulitis  He does have a history of depression  Sertraline has been helpful  He has history of reflux and denies any current problems with heartburn or dysphagia  He has been unsuccessful in getting off PPIs in the past   He has known BPH and denies excessive voiding issues currently  He has status post TURP x2        The following portions of the patient's history were reviewed and updated as appropriate: allergies, current medications, past family history, past medical history, past social history, past surgical history and problem list       Current Outpatient Medications:     amLODIPine (NORVASC) 5 mg tablet, Take 1 tablet (5 mg total) by mouth every evening, Disp: 90 tablet, Rfl: 3    amoxicillin-clavulanate (AUGMENTIN) 875-125 mg per tablet, Take 1 tablet by mouth every 12 (twelve) hours for 10 days, Disp: 20 tablet, Rfl: 0    aspirin 81 MG tablet, Take 1 tablet by mouth daily, Disp: , Rfl:     atorvastatin (LIPITOR) 20 mg tablet, Take 1 tablet (20 mg total) by mouth daily, Disp: 90 tablet, Rfl: 3    carvedilol (COREG) 12 5 mg tablet, Take 1 tablet (12 5 mg total) by mouth 2 (two) times a day with meals, Disp: 180 tablet, Rfl: 3    fluticasone (FLONASE) 50 mcg/act nasal spray, 2 sprays into each nostril daily as needed for allergies, Disp: 3 Bottle, Rfl: 3    losartan (COZAAR) 100 MG tablet, Take 1 tablet (100 mg total) by mouth daily, Disp: 90 tablet, Rfl: 3    Milk Thistle 1000 MG CAPS, Take 250 mg by mouth , Disp: , Rfl:     minoxidil (LONITEN) 10 mg tablet, TAKE 1 TABLET BY MOUTH ONCE DAILY, Disp: 90 tablet, Rfl: 3    montelukast (SINGULAIR) 10 mg tablet, Take 1 tablet (10 mg total) by mouth daily, Disp: 90 tablet, Rfl: 3    naproxen (EC NAPROSYN) 500 MG EC tablet, Take 1 tablet (500 mg total) by mouth 2 (two) times a day with meals (Patient taking differently: Take 500 mg by mouth as needed ), Disp: 60 tablet, Rfl: 2    Omega-3 Fatty Acids (FISH OIL) 1,000 mg, Take by mouth daily, Disp: , Rfl:     omeprazole (PriLOSEC) 40 MG capsule, Take 1 capsule (40 mg total) by mouth daily, Disp: 90 capsule, Rfl: 3    sertraline (ZOLOFT) 50 mg tablet, Take 1 tablet (50 mg total) by mouth daily, Disp: 90 tablet, Rfl: 3    tamsulosin (FLOMAX) 0 4 mg, TAKE ONE CAPSULE BY MOUTH EVERY DAY WITH DINNER, Disp: 90 capsule, Rfl: 3    zolpidem (AMBIEN) 10 mg tablet, TAKE ONE TABLET BY MOUTH AT BEDTIME AS NEEDED FOR SLEEP, Disp: 90 tablet, Rfl: 0     Review of Systems   Constitutional: Negative for appetite change, chills, fatigue, fever and unexpected weight change  HENT: Negative for congestion and trouble swallowing  Eyes: Negative for visual disturbance  Respiratory: Negative for cough, chest tightness, shortness of breath and wheezing  Cardiovascular: Negative for chest pain  Gastrointestinal: Negative for abdominal distention, abdominal pain, blood in stool, constipation, diarrhea, nausea and rectal pain  Endocrine: Negative for polyuria     Genitourinary: Negative for difficulty urinating and flank pain  Musculoskeletal: Negative for arthralgias and myalgias  Skin: Negative for rash  Neurological: Negative for dizziness and light-headedness  Hematological: Negative for adenopathy  Does not bruise/bleed easily  Psychiatric/Behavioral: Positive for dysphoric mood  Negative for sleep disturbance  Objective:      /80 (BP Location: Right arm, Patient Position: Sitting, Cuff Size: Standard)   Pulse 61   Temp (!) 97 3 °F (36 3 °C) (Tympanic)   Ht 5' 6" (1 676 m)   Wt 87 7 kg (193 lb 6 4 oz)   SpO2 95%   BMI 31 22 kg/m²          Physical Exam   Constitutional: He is oriented to person, place, and time  He appears well-developed and well-nourished  No distress  HENT:   Head: Normocephalic  Eyes: Pupils are equal, round, and reactive to light  Right eye exhibits no discharge  Left eye exhibits no discharge  Neck: No tracheal deviation present  No thyromegaly present  Cardiovascular: Normal rate, regular rhythm and normal heart sounds  Exam reveals no gallop  No murmur heard  Pulmonary/Chest: Effort normal  No respiratory distress  He has no wheezes  He has no rales  Abdominal: Soft  He exhibits no distension and no mass  There is no tenderness  There is no rebound and no guarding  No hernia  Musculoskeletal: Normal range of motion  He exhibits no edema  Lymphadenopathy:     He has no cervical adenopathy  Neurological: He is alert and oriented to person, place, and time  No cranial nerve deficit  Skin: Skin is warm  He is not diaphoretic  No erythema  Psychiatric: He has a normal mood and affect   Judgment and thought content normal          Judy Castro MD

## 2020-01-13 NOTE — ASSESSMENT & PLAN NOTE
Confirmed on recent CT scan  He also has some known bladder diverticuli  I am going to arrange for Urology follow-up

## 2020-01-13 NOTE — ASSESSMENT & PLAN NOTE
He has some mild splenomegaly noted on his most recent CT scan done for diverticulitis  This was not noted on previous CT scan  Consider repeat imaging with an ultrasound in about 6 months  This could certainly be coming from his history of chronic alcohol use as well as from his history of CLL  At any rate, it was mild and does not need any current intervention  I would, as always, encouraged him to moderate or stop drinking altogether  We will continue to monitor his CLL through Oncology

## 2020-01-13 NOTE — ASSESSMENT & PLAN NOTE
Blood pressure elevated today  I did encourage him to check some blood pressure readings at home and notify me if he is running consistently above 140/90  He was stressed today regarding his recent CT scan as well as recent diverticulitis  Home blood pressures have been running quite good lately    He is coming back to see me in March and will bring his blood pressure cuff at that time

## 2020-01-13 NOTE — ASSESSMENT & PLAN NOTE
Finish Augmentin  He certainly is improving clinically  He is following with GI  Routinely for his reflux and we can certainly get their opinion if he continues to have recurrent diverticulitis  In regards to any further steps

## 2020-01-13 NOTE — ASSESSMENT & PLAN NOTE
Stable with sertraline  He has a bit of mild dysphoric mood in light of his recent diverticulitis  I will see him back here in March

## 2020-01-14 ENCOUNTER — TELEPHONE (OUTPATIENT)
Dept: PULMONOLOGY | Facility: CLINIC | Age: 70
End: 2020-01-14

## 2020-01-16 NOTE — RESULT ENCOUNTER NOTE
I called him with his results  Polyps were hyperplastic  He is planning to follow-up with me in 6 months or sooner if he develops any symptoms

## 2020-01-18 ENCOUNTER — TELEPHONE (OUTPATIENT)
Dept: OTHER | Facility: OTHER | Age: 70
End: 2020-01-18

## 2020-01-18 NOTE — TELEPHONE ENCOUNTER
Joey Bettencourt 1950  CONFIDENTIALTY NOTICE: This fax transmission is intended only for the addressee  It contains information that is legally privileged,  confidential or otherwise protected from use or disclosure  If you are not the intended recipient, you are strictly prohibited from reviewing,  disclosing, copying using or disseminating any of this information or taking any action in reliance on or regarding this information  If you have  received this fax in error, please notify us immediately by telephone so that we can arrange for its return to us  Page:   Call Id: [id number]  Health Call  Standard Call Report  Health Call  Patient Name: Joey Bettencourt  Gender: Male  : 1950  Age: 81 Y 3 M  Return Phone  Number: [phone] (Home)  Address: [address] [place] [address] [address]  [facility]: [facility] [address]  Practice Name: Walter Petersen Charged:  Physician:  [facility] Name:  Relationship To  Patient: Self  Return Phone Number: [phone] (Home)  Presenting Problem: "I am on Augmentin and I think I am  having a reaction "  Service Type: Triage  Charged Service 1: N/A  Pharmacy Name and  Number:  Nurse Assessment  Nurse: Esmer Lara Date/Time: 2020 7:20:15 AM  Type of assessment required:  ---General (Adult or Child)  Duration of Current S/S  ---Has been on-going since starting Augmentin  Location/Radiation  ---GI Intolerance  Temperature (F) and route:  ---Denies fever  Symptom Specific Meds (Dose/Time):  ---Currently on Augmentin Has 5 doses left  Other S/S  ---Patient on Augmentin for Diverticulitis  Since starting it, patient feels as though  he has a lot of flatulence  No c/o abdominal pain  Patient stated, "my diverticulitis is  definitely improved  Patient stated that he does have intermittent itching but no rash,  Itching relieved after using Gold Bold lotion for dry skin    Pain Scale on scale of 1-10, 10 being the worst:  ---"Discomfort due to excess gas "  Symptom progression:  ---same  Intake and Output  Monica Evans 1950  CONFIDENTIALTY NOTICE: This fax transmission is intended only for the addressee  It contains information that is legally privileged,  confidential or otherwise protected from use or disclosure  If you are not the intended recipient, you are strictly prohibited from reviewing,  disclosing, copying using or disseminating any of this information or taking any action in reliance on or regarding this information  If you have  received this fax in error, please notify us immediately by telephone so that we can arrange for its return to us  Page: 2 of 2  Call Id: [id number]  Nurse Assessment  ---Normal intake and output  Last Exam/Treatment:  ---01/13/2020 for F/U of Diverticulitis  Protocols  Protocol Title Nurse Date/Time  Abdominal Pain - Upper Lasha Stall 1/18/2020 7:38:49 AM  Question Caller Affirmed  Disp  Time Disposition Final User  1/18/2020 7:41:08 [address] [place] JOSE Vogel Jerlean Barnes  1/18/2020 7:43:09 AM RN Triaged Yes Shelia Laboy, JOSE, [facility] Advice Given Per Protocol  HOME CARE: You should be able to treat this at home  REASSURANCE: It doesn't sound like a serious stomachache  A mild  stomachache can be from indigestion, stomach irritation, or overeating  Sometimes a stomachache signals the onset of a vomiting illness  from a virus  ANTACID: If having pain now, try taking an antacid (e g , Mylanta, Maalox)  Dose: 2 tablespoons (30 ml) of liquid  FLUIDS: * Drink clear liquids only (e g , water, flat soft drinks or half-strength Gatorade), small amounts at a time, until the pain is  resolved for 2 hours  * Then slowly return to a regular diet  STOP SMOKING: Smoking can aggravate heartburn and stomach problems  AVOID ALCOHOL: Alcohol is a stomach irritant  EXPECTED COURSE: With harmless causes, the pain is usually better or resolved  in 2 hours   With gastroenteritis, belly cramps may precede each bout of vomiting or diarrhea  With serious causes (such as appendicitis)  the pain tends to become constant and severe CALL BACK IF: * Severe pain present over 1 hour * Constant pain present over 2 hours *  Intermittent pain lasts over 48 hours * You become worse  CARE ADVICE given per Abdominal Pain, Upper (Adult) guideline  Caller Understands: Yes  Caller Disagree/Comply: Comply  PreDisposition: Unsure  Comments  User: Lulu Walker RN Date/Time: 1/18/2020 7:43:02 AM  Patient is going to try antacid  Will take the Augmentin with food and will start Probiotics as well  If none of these help, patient  stated that he would stop the Augmentin

## 2020-01-20 ENCOUNTER — TELEPHONE (OUTPATIENT)
Dept: UROLOGY | Facility: AMBULATORY SURGERY CENTER | Age: 70
End: 2020-01-20

## 2020-01-20 DIAGNOSIS — Z12.5 PROSTATE CANCER SCREENING: Primary | ICD-10-CM

## 2020-01-20 NOTE — TELEPHONE ENCOUNTER
Patient managed by [facility] is calling to say Dr Oral Whitehead referred him to have follow up for significant finding in Cat Scan imaging  Patient would like a call back to set up this appointment

## 2020-01-20 NOTE — TELEPHONE ENCOUNTER
Called and spoke to very upset pateint who cut me off multiple times due to the fact that he was "forgotten about"  I tried to inform patein that FT will review his CT   Patient cut me off and told me to just schedule a damn appointment he already knows everything about the CT "probaly more then you know"  I then scheduled patient at soonest appointment   Durga Ewing MD)  02/12/2020 at 4:00 PM Mo     Patient would like to know if he should get a repeat PSA PTV     Please review CT and advise current  appointment

## 2020-01-21 ENCOUNTER — TELEPHONE (OUTPATIENT)
Dept: UROLOGY | Facility: CLINIC | Age: 70
End: 2020-01-21

## 2020-01-21 NOTE — TELEPHONE ENCOUNTER
Called and left a message with pt wife in regards to his appointment on 2/12/20 and PSA PTV    Patient stated he refuses to come in for sooner appointment  As I tried to inform patient of CT notes Patient stated that he does not feel like it needed "I know more about the CT then you do"    Spoke to patient he is very upset about communication from call center and feels he should not have to "put up with aditi faust  If I was to write a review I would give it a 1 start  You can note down all this   DO you hear me SIR" I expressed that I was sorry he felt that way and he then hung up on me      Please review CT and advise

## 2020-01-22 NOTE — TELEPHONE ENCOUNTER
CT scan shows bladder wall thickening  This was ordered by PMD   He has a history of BPH s/p TURP  F/U arranged for Feb 2020 with PSA  Let me know if you think that patient needs a call from me  Thank you

## 2020-01-22 NOTE — TELEPHONE ENCOUNTER
CT reviewed showing thick walled bladder and possible distension  S/p TURP  Patient has appt with me in Feb   Discussed with patient checking PVR/Uroflow next 1-2 weeks with AP  Patient is amenable with this plan and was pleased with the phone call

## 2020-01-22 NOTE — TELEPHONE ENCOUNTER
Patient placed call to office, was immediately upset with front office staff and demanded to speak with nurse  I took the call  Advised patient that I am aware of recommendation by Dr Dante Lane to schedule office visit in 1-2 weeks with AP  Patient demands to be seen next week as he has been "bounced around like a yo yo"  I advised that I would look for an appointment as soon as possible based on openings  Patient became loud and complaining that our staff has "no compassion"  I advised patient that we are attempting to help him however he continues to call office and be rude to office staff  He states that he should not be bounced around "God damn it"  I asked patient to kindly not use that language  He became angry and loud again  I was finally able to get him scheduled with AP at [facility] office for 1/30 and wished him a nice day

## 2020-01-22 NOTE — TELEPHONE ENCOUNTER
This is a duplicate phone encounter  Please go to phone encounter from 1/20/20 where Dr Mary Quintero has already advised

## 2020-01-28 PROBLEM — N13.8 BPH WITH OBSTRUCTION/LOWER URINARY TRACT SYMPTOMS: Status: ACTIVE | Noted: 2020-01-28

## 2020-01-28 PROBLEM — Z12.5 PROSTATE CANCER SCREENING: Status: ACTIVE | Noted: 2020-01-28

## 2020-01-28 PROBLEM — N40.1 BPH WITH OBSTRUCTION/LOWER URINARY TRACT SYMPTOMS: Status: ACTIVE | Noted: 2020-01-28

## 2020-01-28 NOTE — PROGRESS NOTES
1/30/2020      Chief Complaint   Patient presents with    Follow-up    Benign Prostatic Hypertrophy       Assessment and Plan    81 y o  male managed by Dr Annmarie Spann    1  BPH s/p TURP 10+ years ago  - PVR 383mL  - uroflow inaccurate due to a small voided volume   - patient asymptomatic and has bladder diverticulum which could make a PVR inaccurate, discussed catheter versus starting 2 pills of flomax with a recheck PVR at upcoming visit   - the patient wishes to take 2 pills of flomax and re-evaluate, he is aware to call with any abdominal pain or inability to urinate      2  Prostate cancer screening   - PSA 0 2 (2/18/19)  - patient to have PSA prior to next visit, will also be due for ZAIRE    FU as scheduled       History of Present Illness  Kaye Car is a 81 y o  male here for follow up evaluation of a CT scan revealing a markedly distended bladder with circumferential wall thickening and multiple large diverticula  The patient presents today for uroflow and PVR  Uroflow inaccurate due to small voided volume and PVR 383mL  The patient has no pain or discomfort  He is urinating but does admit to some occasional hesitancy  He underwent a TURP 10-20 years ago  Is currently on 1 pill of flomax  Review of Systems   Constitutional: Negative for activity change, chills and fever  Gastrointestinal: Negative for abdominal distention and abdominal pain  Musculoskeletal: Negative for back pain and gait problem  Psychiatric/Behavioral: Negative for behavioral problems and confusion         Past Medical History  Past Medical History:   Diagnosis Date    Anxiety     BPH (benign prostatic hypertrophy)     CLL [facility]chronic [facility]ocytic leukemia) ([facility] [address] )     2009    Colon polyp     Concussion     Resolved: 08/22/16    Depression     Gastrointestinal hemorrhage     Last assessed: 08/27/13    GERD (gastroesophageal reflux disease)     Hearing loss of aging     Hiatal hernia     History of transfusion     Hyperlipidemia     Hypertension     Iron deficiency anemia     Microscopic hematuria     Last assessed: 13    OA (osteoarthritis)     right hip    Pneumothorax     Prostatitis     Pulmonary hypertension (HCC)     Seasonal allergies     UTI (urinary tract infection)     in past    Wears glasses        Past Social History  Past Surgical History:   Procedure Laterality Date    COLONOSCOPY      CYSTOSCOPY  10/09/2014    Diagnostic    FRACTURE SURGERY      left lower arm    FRACTURE SURGERY      left femur    HERNIA REPAIR      JOINT REPLACEMENT Right     hip    OTHER SURGICAL HISTORY  2011    Spinal anesthesia epidural    MD TOTAL HIP ARTHROPLASTY Right 2017    Procedure: ARTHROPLASTY HIP TOTAL ANTERIOR;  Surgeon: Junior Chilel MD;  Location: [facility];  Service: Orthopedics    TONSILLECTOMY AND ADENOIDECTOMY      TRANSURETHRAL RESECTION OF PROSTATE      x 2    WRIST SURGERY       Social History     Tobacco Use   Smoking Status Former Smoker    Packs/day: 1 00    Years: 15 00    Pack years: 15 00    Types: Cigarettes    Start date:     Last attempt to quit: 1980    Years since quittin 4   Smokeless Tobacco Never Used       Past Family History  Family History   Problem Relation Age of Onset    No Known Problems Mother     Heart attack Father     Diabetes Brother     Prostate cancer Brother        Past Social history  Social History     Socioeconomic History    Marital status: /Civil Union     Spouse name: Not on file    Number of children: Not on file    Years of education: Not on file    Highest education level: Not on file   Occupational History    Not on file   Social Needs    Financial resource strain: Not on file    Food insecurity:     Worry: Not on file     Inability: Not on file    Transportation needs:     Medical: Not on file     Non-medical: Not on file   Tobacco Use    Smoking status: Former Smoker     Packs/day: 1 00     Years: 15 00 Pack years: 15 00     Types: Cigarettes     Start date:      Last attempt to quit: 1980     Years since quittin 4    Smokeless tobacco: Never Used   Substance and Sexual Activity    Alcohol use:  Yes     Alcohol/week: 10 0 standard drinks     Types: 10 Cans of beer per week     Frequency: 4 or more times a week     Drinks per session: 3 or 4    Drug use: Not Currently     Types: Marijuana    Sexual activity: Not Currently   Lifestyle    Physical activity:     Days per week: Not on file     Minutes per session: Not on file    Stress: Not on file   Relationships    Social connections:     Talks on phone: Not on file     Gets together: Not on file     Attends Synagogue service: Not on file     Active member of club or organization: Not on file     Attends meetings of clubs or organizations: Not on file     Relationship status: Not on file    Intimate partner violence:     Fear of current or ex partner: Not on file     Emotionally abused: Not on file     Physically abused: Not on file     Forced sexual activity: Not on file   Other Topics Concern    Not on file   Social History Narrative    Not on file       Current Medications  Current Outpatient Medications   Medication Sig Dispense Refill    amLODIPine (NORVASC) 5 mg tablet Take 1 tablet (5 mg total) by mouth every evening 90 tablet 3    aspirin 81 MG tablet Take 1 tablet by mouth daily      atorvastatin (LIPITOR) 20 mg tablet Take 1 tablet (20 mg total) by mouth daily 90 tablet 3    carvedilol (COREG) 12 5 mg tablet Take 1 tablet (12 5 mg total) by mouth 2 (two) times a day with meals 180 tablet 3    fluticasone (FLONASE) 50 mcg/act nasal spray 2 sprays into each nostril daily as needed for allergies 3 Bottle 3    losartan (COZAAR) 100 MG tablet Take 1 tablet (100 mg total) by mouth daily 90 tablet 3    Milk Thistle 1000 MG CAPS Take 250 mg by mouth       minoxidil (LONITEN) 10 mg tablet TAKE 1 TABLET BY MOUTH ONCE DAILY 90 tablet 3  montelukast (SINGULAIR) 10 mg tablet Take 1 tablet (10 mg total) by mouth daily 90 tablet 3    naproxen (EC NAPROSYN) 500 MG EC tablet Take 1 tablet (500 mg total) by mouth 2 (two) times a day with meals (Patient taking differently: Take 500 mg by mouth as needed ) 60 tablet 2    Omega-3 Fatty Acids (FISH OIL) 1,000 mg Take by mouth daily      omeprazole (PriLOSEC) 40 MG capsule Take 1 capsule (40 mg total) by mouth daily 90 capsule 3    sertraline (ZOLOFT) 50 mg tablet Take 1 tablet (50 mg total) by mouth daily 90 tablet 3    zolpidem (AMBIEN) 10 mg tablet TAKE ONE TABLET BY MOUTH AT BEDTIME AS NEEDED FOR SLEEP 90 tablet 0    tamsulosin (FLOMAX) 0 4 mg Take 2 capsules (0 8 mg total) by mouth daily with dinner 60 capsule 5     No current facility-administered medications for this visit  Allergies  Allergies   Allergen Reactions    Codeine Other (See Comments)     agitated    Bactrim [Sulfamethoxazole-Trimethoprim] GI Intolerance         The following portions of the patient's history were reviewed and updated as appropriate: allergies, current medications, past medical history, past social history, past surgical history and problem list       Vitals  Vitals:    01/30/20 1313   BP: 150/70   BP Location: Left arm   Patient Position: Sitting   Cuff Size: Adult   Pulse: 64   Weight: 88 kg (194 lb)   Height: 5' 6" (1 676 m)          Physical Exam  Constitutional   General appearance: Patient is seated and in no acute distress, well appearing and well nourished  Head and Face   Head and face: Normal     Eyes   Conjunctiva and lids: No erythema, swelling or discharge  Ears, Nose, Mouth, and Throat   Hearing: Normal     Pulmonary   Respiratory effort: No increased work of breathing or signs of respiratory distress  Cardiovascular   Examination of extremities for edema and/or varicosities: Normal     Abdomen   Abdomen: Non-tender, no masses      Musculoskeletal   Gait and station: Normal     Skin Skin and subcutaneous tissue: Warm, dry, and intact  No visible lesions or rashes    Psychiatric   Judgment and insight: Normal  Recent and remote memory:  Normal  Mood and affect: Normal      Results  Recent Results (from the past 1 hour(s))   POCT Measure PVR    Collection Time: 01/30/20  1:28 PM   Result Value Ref Range    POST-VOID RESIDUAL VOLUME, ML  mL   ]  Lab Results   Component Value Date    PSA 0 2 02/18/2019    PSA 0 1 02/12/2018    PSA 0 2 02/15/2017     Lab Results   Component Value Date    GLUCOSE 119 07/20/2015    CALCIUM 8 7 12/31/2019     (L) 07/20/2015    K 4 2 12/31/2019    CO2 28 12/31/2019    CL 99 (L) 12/31/2019    BUN 7 12/31/2019    CREATININE 0 85 12/31/2019     Lab Results   Component Value Date    WBC 23 76 (H) 12/31/2019    HGB 12 2 12/31/2019    HCT 36 3 (L) 12/31/2019    MCV 98 12/31/2019     (L) 12/31/2019       Orders  Orders Placed This Encounter   Procedures    POCT Measure PVR

## 2020-01-30 ENCOUNTER — APPOINTMENT (OUTPATIENT)
Dept: LAB | Facility: MEDICAL CENTER | Age: 70
End: 2020-01-30
Payer: MEDICARE

## 2020-01-30 ENCOUNTER — OFFICE VISIT (OUTPATIENT)
Dept: UROLOGY | Facility: CLINIC | Age: 70
End: 2020-01-30
Payer: MEDICARE

## 2020-01-30 VITALS
BODY MASS INDEX: 31.18 KG/M2 | SYSTOLIC BLOOD PRESSURE: 150 MMHG | HEART RATE: 64 BPM | WEIGHT: 194 LBS | DIASTOLIC BLOOD PRESSURE: 70 MMHG | HEIGHT: 66 IN

## 2020-01-30 DIAGNOSIS — Z12.5 PROSTATE CANCER SCREENING: ICD-10-CM

## 2020-01-30 DIAGNOSIS — N13.8 BPH WITH OBSTRUCTION/LOWER URINARY TRACT SYMPTOMS: Primary | ICD-10-CM

## 2020-01-30 DIAGNOSIS — N40.1 BPH WITH OBSTRUCTION/LOWER URINARY TRACT SYMPTOMS: Primary | ICD-10-CM

## 2020-01-30 LAB
POST-VOID RESIDUAL VOLUME, ML POC: 383 ML
PSA SERPL-MCNC: 0.2 NG/ML (ref 0–4)

## 2020-01-30 PROCEDURE — 51798 US URINE CAPACITY MEASURE: CPT | Performed by: PHYSICIAN ASSISTANT

## 2020-01-30 PROCEDURE — 36415 COLL VENOUS BLD VENIPUNCTURE: CPT

## 2020-01-30 PROCEDURE — G0103 PSA SCREENING: HCPCS

## 2020-01-30 PROCEDURE — 99213 OFFICE O/P EST LOW 20 MIN: CPT | Performed by: PHYSICIAN ASSISTANT

## 2020-01-30 RX ORDER — TAMSULOSIN HYDROCHLORIDE 0.4 MG/1
0.8 CAPSULE ORAL
Qty: 60 CAPSULE | Refills: 5 | Status: SHIPPED | OUTPATIENT
Start: 2020-01-30 | End: 2020-07-09

## 2020-01-30 NOTE — LETTER
January 30, 2020     Vickey Coffey MD  [id number]   [address]    Patient: Nuvia Blanco   YOB: 1950   Date of Visit: 1/30/2020       Dear Dr Madelyn Albarran: Thank you for referring [facility] to me for evaluation  Below are the relevant portions of my assessment and plan of care  If you have questions, please do not hesitate to call me  I look forward to following Heidi Naqvi along with you           Sincerely,        Kenroy Patterson PA-C        CC: Durga Ewing MD

## 2020-02-12 ENCOUNTER — OFFICE VISIT (OUTPATIENT)
Dept: UROLOGY | Facility: AMBULATORY SURGERY CENTER | Age: 70
End: 2020-02-12
Payer: MEDICARE

## 2020-02-12 ENCOUNTER — TELEPHONE (OUTPATIENT)
Dept: UROLOGY | Facility: AMBULATORY SURGERY CENTER | Age: 70
End: 2020-02-12

## 2020-02-12 VITALS
WEIGHT: 197.2 LBS | SYSTOLIC BLOOD PRESSURE: 142 MMHG | HEART RATE: 59 BPM | BODY MASS INDEX: 31.69 KG/M2 | HEIGHT: 66 IN | DIASTOLIC BLOOD PRESSURE: 76 MMHG

## 2020-02-12 DIAGNOSIS — R33.9 INCOMPLETE EMPTYING OF BLADDER: Primary | ICD-10-CM

## 2020-02-12 PROCEDURE — 1160F RVW MEDS BY RX/DR IN RCRD: CPT | Performed by: UROLOGY

## 2020-02-12 PROCEDURE — 99214 OFFICE O/P EST MOD 30 MIN: CPT | Performed by: UROLOGY

## 2020-02-12 PROCEDURE — 1036F TOBACCO NON-USER: CPT | Performed by: UROLOGY

## 2020-02-12 PROCEDURE — 3008F BODY MASS INDEX DOCD: CPT | Performed by: UROLOGY

## 2020-02-12 PROCEDURE — 3078F DIAST BP <80 MM HG: CPT | Performed by: UROLOGY

## 2020-02-12 PROCEDURE — 4040F PNEUMOC VAC/ADMIN/RCVD: CPT | Performed by: UROLOGY

## 2020-02-12 PROCEDURE — 3077F SYST BP >= 140 MM HG: CPT | Performed by: UROLOGY

## 2020-02-12 PROCEDURE — 51798 US URINE CAPACITY MEASURE: CPT | Performed by: UROLOGY

## 2020-02-12 NOTE — PROGRESS NOTES
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 and to obtain supplies  I also recommend repeating cystoscopy to see if he requires a 3rd TURP for bladder outlet obstruction  The patient is amenable with this plan will continue on the tamsulosin at this time  History of Present Illness  81 y o  male with a history of BPH status post TURP x2  One performed at [facility] and the 2nd at [facility]  At this time he feels that he is voiding well but he is unsure if he empties to completion  He is taking tamsulosin 0 4 mg x2 tablets daily  He returns in follow-up today as a recent postvoid residual was 380 cc  AUA Symptom Score      Review of Systems  Review of Systems   Constitutional: Negative  HENT: Negative  Eyes: Negative  Respiratory: Negative  Cardiovascular: Negative  Gastrointestinal: Negative  Endocrine: Negative  Genitourinary:        Per HPI   Musculoskeletal: Negative  Skin: Negative  Allergic/Immunologic: Negative  Neurological: Negative  Hematological: Negative  Psychiatric/Behavioral: Negative            Past Medical History  Past Medical History:   Diagnosis Date    Anxiety     BPH (benign prostatic hypertrophy)     [facility]     [facility]    Colon polyp     Concussion     Resolved: 08/22/16    Depression     Gastrointestinal hemorrhage     Last assessed: 08/27/13    GERD (gastroesophageal reflux disease)     Hearing loss of aging     Hiatal hernia     History of transfusion     Hyperlipidemia     Hypertension     Iron deficiency anemia     Microscopic hematuria     Last assessed: 13    OA (osteoarthritis)     right hip    Pneumothorax     Prostatitis     Pulmonary hypertension (HCC)     Seasonal allergies     UTI (urinary tract infection)     in past    Wears glasses        Past Social History  Past Surgical History:   Procedure Laterality Date    COLONOSCOPY      CYSTOSCOPY  10/09/2014    Diagnostic    FRACTURE SURGERY      left lower arm    FRACTURE SURGERY      left femur    HERNIA REPAIR      JOINT REPLACEMENT Right     hip    OTHER SURGICAL HISTORY  2011    Spinal anesthesia epidural    NM TOTAL HIP ARTHROPLASTY Right 2017    Procedure: ARTHROPLASTY HIP TOTAL ANTERIOR;  Surgeon: Nneka Ramos MD;  [facility];  Service: Orthopedics    TONSILLECTOMY AND ADENOIDECTOMY      TRANSURETHRAL RESECTION OF PROSTATE      x 2    WRIST SURGERY         Past Family History  Family History   Problem Relation Age of Onset    No Known Problems Mother     Heart attack Father     Diabetes Brother     Prostate cancer Brother        Past Social history  Social History     Socioeconomic History    Marital status: /Civil Union     Spouse name: Not on file    Number of children: Not on file    Years of education: Not on file    Highest education level: Not on file   Occupational History    Not on file   Social Needs    Financial resource strain: Not on file    Food insecurity:     Worry: Not on file     Inability: Not on file    Transportation needs:     Medical: Not on file     Non-medical: Not on file   Tobacco Use    Smoking status: Former Smoker     Packs/day: 1 00     Years: 15 00     Pack years: 15 00     Types: Cigarettes     Start date:      Last attempt to quit: 1980     Years since quittin 4    Smokeless tobacco: Never Used   Substance and Sexual Activity    Alcohol use:  Yes     Alcohol/week: 10 0 standard drinks     Types: 10 Cans of beer per week     Frequency: 4 or more times a week     Drinks per session: 3 or 4    Drug use: Not Currently     Types: Marijuana    Sexual activity: Not Currently   Lifestyle    Physical activity:     Days per week: Not on file     Minutes per session: Not on file    Stress: Not on file   Relationships    Social connections:     Talks on phone: Not on file     Gets together: Not on file     Attends Taoism service: Not on file     Active member of club or organization: Not on file     Attends meetings of clubs or organizations: Not on file     Relationship status: Not on file    Intimate partner violence:     Fear of current or ex partner: Not on file     Emotionally abused: Not on file     Physically abused: Not on file     Forced sexual activity: Not on file   Other Topics Concern    Not on file   Social History Narrative    Not on file       Current Medications  Current Outpatient Medications   Medication Sig Dispense Refill    amLODIPine (NORVASC) 5 mg tablet Take 1 tablet (5 mg total) by mouth every evening 90 tablet 3    aspirin 81 MG tablet Take 1 tablet by mouth daily      atorvastatin (LIPITOR) 20 mg tablet Take 1 tablet (20 mg total) by mouth daily 90 tablet 3    carvedilol (COREG) 12 5 mg tablet Take 1 tablet (12 5 mg total) by mouth 2 (two) times a day with meals 180 tablet 3    fluticasone (FLONASE) 50 mcg/act nasal spray 2 sprays into each nostril daily as needed for allergies 3 Bottle 3    losartan (COZAAR) 100 MG tablet Take 1 tablet (100 mg total) by mouth daily 90 tablet 3    Milk Thistle 1000 MG CAPS Take 250 mg by mouth       minoxidil (LONITEN) 10 mg tablet TAKE 1 TABLET BY MOUTH ONCE DAILY 90 tablet 3    montelukast (SINGULAIR) 10 mg tablet Take 1 tablet (10 mg total) by mouth daily 90 tablet 3    naproxen (EC NAPROSYN) 500 MG EC tablet Take 1 tablet (500 mg total) by mouth 2 (two) times a day with meals (Patient taking differently: Take 500 mg by mouth as needed ) 60 tablet 2    Omega-3 Fatty Acids (FISH OIL) 1,000 mg Take by mouth daily      omeprazole (PriLOSEC) 40 MG capsule Take 1 capsule (40 mg total) by mouth daily 90 capsule 3    sertraline (ZOLOFT) 50 mg tablet Take 1 tablet (50 mg total) by mouth daily 90 tablet 3    tamsulosin (FLOMAX) 0 4 mg Take 2 capsules (0 8 mg total) by mouth daily with dinner 60 capsule 5    zolpidem (AMBIEN) 10 mg tablet TAKE ONE TABLET BY MOUTH AT BEDTIME AS NEEDED FOR SLEEP 90 tablet 0     No current facility-administered medications for this visit  Allergies  Allergies   Allergen Reactions    Codeine Other (See Comments)     agitated    Bactrim [Sulfamethoxazole-Trimethoprim] GI Intolerance       Past Medical History, Social History, Family History, medications and allergies were reviewed  Vitals  Vitals:    02/12/20 1605   BP: 142/76   BP Location: Left arm   Patient Position: Sitting   Cuff Size: Adult   Pulse: 59   Weight: 89 4 kg (197 lb 3 2 oz)   Height: 5' 6" (1 676 m)       Physical Exam  Physical Exam  On examination he is in no acute distress  Gait normal   Affect normal    Results  Lab Results   Component Value Date    PSA 0 2 01/30/2020    PSA 0 2 02/18/2019    PSA 0 1 02/12/2018     Lab Results   Component Value Date    GLUCOSE 119 07/20/2015    CALCIUM 8 7 12/31/2019     (L) 07/20/2015    K 4 2 12/31/2019    CO2 28 12/31/2019    CL 99 (L) 12/31/2019    BUN 7 12/31/2019    CREATININE 0 85 12/31/2019     Lab Results   Component Value Date    WBC 23 76 (H) 12/31/2019    HGB 12 2 12/31/2019    HCT 36 3 (L) 12/31/2019    MCV 98 12/31/2019     (L) 12/31/2019         Office Urine Dip  No results found for this or any previous visit (from the past 1 hour(s))  ]    I performed ultrasound with the medical assistant which revealed a bladder with a PVR of 390 cc  There also 3 medium-sized bladder diverticula filled with urine which were not included in this calculation      Total visit time was 25 minutes of which over 50% was spent on counseling

## 2020-02-13 ENCOUNTER — TELEPHONE (OUTPATIENT)
Dept: UROLOGY | Facility: CLINIC | Age: 70
End: 2020-02-13

## 2020-02-18 ENCOUNTER — TELEPHONE (OUTPATIENT)
Dept: PULMONOLOGY | Facility: CLINIC | Age: 70
End: 2020-02-18

## 2020-02-18 NOTE — TELEPHONE ENCOUNTER
Pt has an appt w/ Dr Zeeshan Walker on 4/2 ([facility]), need to reschedule his appt, provider not available

## 2020-02-26 ENCOUNTER — PROCEDURE VISIT (OUTPATIENT)
Dept: UROLOGY | Facility: CLINIC | Age: 70
End: 2020-02-26
Payer: MEDICARE

## 2020-02-26 VITALS
WEIGHT: 198.2 LBS | DIASTOLIC BLOOD PRESSURE: 74 MMHG | HEIGHT: 66 IN | HEART RATE: 68 BPM | BODY MASS INDEX: 31.85 KG/M2 | SYSTOLIC BLOOD PRESSURE: 118 MMHG

## 2020-02-26 DIAGNOSIS — N13.8 BPH WITH OBSTRUCTION/LOWER URINARY TRACT SYMPTOMS: ICD-10-CM

## 2020-02-26 DIAGNOSIS — N40.1 BPH WITH OBSTRUCTION/LOWER URINARY TRACT SYMPTOMS: ICD-10-CM

## 2020-02-26 PROCEDURE — 99211 OFF/OP EST MAY X REQ PHY/QHP: CPT

## 2020-02-26 NOTE — PROGRESS NOTES
2/26/2020    Naseem Stovall is a 81 y o  male  [id number]    Diagnosis:  Chief Complaint     Benign Prostatic Hypertrophy; [facility] Teaching          Patient presents for Clean intermittent catheterization teaching managed by Dr Marco A De Santiago:  · Patient provided with catheter samples and a copy of written instructions  · Patient will catheterize twice daily   · Catheter ordered submitted today to [place] [id number] for size 16 Fr  · Patient will Follow up as scheduled with Dr Wayne Sharma for routine office cystoscopy   · Patient knows to call the office with any concerns, problems with supplies or difficulty passing catheter  Vitals:    02/26/20 0853   BP: 118/74   BP Location: Right arm   Patient Position: Sitting   Cuff Size: Adult   Pulse: 68   Weight: 89 9 kg (198 lb 3 2 oz)   Height: 5' 6" (1 676 m)         Teaching:    Patient reports previous history of performing self-catheterization  Provided patient with detailed instructions on how to correctly place catheter  Advised patient of need to perform appropriate hand hygiene prior to catheterization  Instructed patient to clean head of the penis with soap and water or cleansing wipe prior to placing catheter  Patient properly demonstrated correct placement and technique with 16 Fr straight catheter in office  All questions answered  Patient's wife also present for teaching  She voiced understanding of all directions as well       Preet Maher RN

## 2020-02-27 ENCOUNTER — TELEPHONE (OUTPATIENT)
Dept: UROLOGY | Facility: MEDICAL CENTER | Age: 70
End: 2020-02-27

## 2020-02-27 DIAGNOSIS — N40.1 BPH WITH OBSTRUCTION/LOWER URINARY TRACT SYMPTOMS: ICD-10-CM

## 2020-02-27 DIAGNOSIS — N13.8 BPH WITH OBSTRUCTION/LOWER URINARY TRACT SYMPTOMS: ICD-10-CM

## 2020-02-27 NOTE — TELEPHONE ENCOUNTER
Medication Refill Request  Dr Jonah Carmen    Patient calling to request a refill of tamsulosin (FLOMAX) 0 4 mg  to be sent to [address] [place] [address],  [address]       Patient can be reached at: [phone]

## 2020-02-28 RX ORDER — TAMSULOSIN HYDROCHLORIDE 0.4 MG/1
0.8 CAPSULE ORAL
Qty: 60 CAPSULE | Refills: 5 | Status: CANCELLED | OUTPATIENT
Start: 2020-02-28 | End: 2020-03-29

## 2020-02-28 NOTE — TELEPHONE ENCOUNTER
Patient was given plenty of refills back in January, 2020  I called the pharmacy  Patient just picked up the medication  No further action required

## 2020-02-28 NOTE — TELEPHONE ENCOUNTER
Spoke with pt  Advised [facility] would be calling which was explained at his visit  Script for flomax will be sent to pharmacy  Pt verbalized understanding   All questions answered

## 2020-02-28 NOTE — TELEPHONE ENCOUNTER
Patient of Dr Monika Winters seen at [address]    Patient calling in regards to catheter sample  States clinical was supposed to call him with this information but he has not heard anything yet  Asking for a call from a nurse to discuss      Patient can be reached at [phone]

## 2020-03-02 ENCOUNTER — PROCEDURE VISIT (OUTPATIENT)
Dept: UROLOGY | Facility: CLINIC | Age: 70
End: 2020-03-02
Payer: MEDICARE

## 2020-03-02 VITALS
HEIGHT: 66 IN | BODY MASS INDEX: 31.66 KG/M2 | HEART RATE: 58 BPM | SYSTOLIC BLOOD PRESSURE: 140 MMHG | WEIGHT: 197 LBS | DIASTOLIC BLOOD PRESSURE: 78 MMHG

## 2020-03-02 DIAGNOSIS — R31.0 GROSS HEMATURIA: Primary | ICD-10-CM

## 2020-03-02 LAB
BACTERIA UR QL AUTO: ABNORMAL /HPF
BILIRUB UR QL STRIP: NEGATIVE
CLARITY UR: ABNORMAL
COLOR UR: YELLOW
GLUCOSE UR STRIP-MCNC: NEGATIVE MG/DL
HGB UR QL STRIP.AUTO: ABNORMAL
HYALINE CASTS #/AREA URNS LPF: ABNORMAL /LPF
KETONES UR STRIP-MCNC: NEGATIVE MG/DL
LEUKOCYTE ESTERASE UR QL STRIP: ABNORMAL
NITRITE UR QL STRIP: NEGATIVE
NON-SQ EPI CELLS URNS QL MICRO: ABNORMAL /HPF
PH UR STRIP.AUTO: 6 [PH]
PROT UR STRIP-MCNC: NEGATIVE MG/DL
RBC #/AREA URNS AUTO: ABNORMAL /HPF
SL AMB  POCT GLUCOSE, UA: NORMAL
SL AMB LEUKOCYTE ESTERASE,UA: NORMAL
SL AMB POCT BILIRUBIN,UA: NORMAL
SL AMB POCT BLOOD,UA: NORMAL
SL AMB POCT CLARITY,UA: NORMAL
SL AMB POCT COLOR,UA: CLEAR
SL AMB POCT KETONES,UA: NORMAL
SL AMB POCT NITRITE,UA: NORMAL
SL AMB POCT PH,UA: 6
SL AMB POCT SPECIFIC GRAVITY,UA: 1.01
SL AMB POCT URINE PROTEIN: NORMAL
SL AMB POCT UROBILINOGEN: NORMAL
SP GR UR STRIP.AUTO: 1.01 (ref 1–1.03)
UROBILINOGEN UR QL STRIP.AUTO: 0.2 E.U./DL
WBC #/AREA URNS AUTO: ABNORMAL /HPF

## 2020-03-02 PROCEDURE — 99211 OFF/OP EST MAY X REQ PHY/QHP: CPT

## 2020-03-02 PROCEDURE — 81002 URINALYSIS NONAUTO W/O SCOPE: CPT

## 2020-03-02 PROCEDURE — 81001 URINALYSIS AUTO W/SCOPE: CPT | Performed by: UROLOGY

## 2020-03-02 PROCEDURE — 87086 URINE CULTURE/COLONY COUNT: CPT | Performed by: UROLOGY

## 2020-03-02 NOTE — TELEPHONE ENCOUNTER
Patient called in regard to refill for Tamsulosin   This was increased to twice daily vs once- call transferred to [facility]  Patient also reported he is requesting catheter supply samples   He reported he will be into the office today to pick them up   Reporting hematuria x1 since onset of self catheterization and wishes to speak with Gustavo Minors with this concern    He can be reached at [phone]  Thank you

## 2020-03-02 NOTE — PROGRESS NOTES
Patient managed by Karolina Jaquez at the [address] [place] [address]  He has a history of Gross Hematuria, Incomplete emptying  Patient Walked in office with complaints of Urinary frequency and Hematuria     Recent Results (from the past 3 hour(s))   POCT urine dip    Collection Time: 03/02/20  4:32 PM   Result Value Ref Range    LEUKOCYTE ESTERASE,UA +++     NITRITE,UA +     SL AMB POCT UROBILINOGEN -     POCT URINE PROTEIN -      PH,UA 6 0     BLOOD,UA +++     SPECIFIC GRAVITY,UA 1 010     KETONES,UA -     BILIRUBIN,UA -     GLUCOSE, UA -      COLOR,UA clear     CLARITY,UA cloudy          Orders placed for UA C&S to rule out urinary tract infection  Office will follow up as results become available usually within 48-72 hours  Patient encouraged to hydrate well with water and avoid bladder irritants  Patient instructed to call back with worsening symptoms, fever, chills, blood in the urine or difficulty urinating

## 2020-03-02 NOTE — TELEPHONE ENCOUNTER
Pt walked in to office for catheter supplies  Pt complained of urinary frequency, some blood in urine  Urine dip done in office(please see results)  Urine sent out for UA and C&S     Will send to Provider

## 2020-03-03 LAB — BACTERIA UR CULT: NORMAL

## 2020-03-05 NOTE — TELEPHONE ENCOUNTER
Urine culture reveals mixed contaminants  Recent CT scan of the abdomen pelvis with contrast revealed no genitourinary abnormalities  If the patient has been straight catheterizing  himself and had some blood will continue to monitor this  If he had blood not prompted by straight catheterization we may consider cystoscopy

## 2020-03-05 NOTE — TELEPHONE ENCOUNTER
Call placed to pt  Advised of urine results and Marysol's recommendations  Pt verbalized understanding  All questions answered   Pt states any further concerns he will discuss with Dr Romero Sylvester at his [address] on 3/23

## 2020-03-06 ENCOUNTER — OFFICE VISIT (OUTPATIENT)
Dept: FAMILY MEDICINE CLINIC | Facility: CLINIC | Age: 70
End: 2020-03-06
Payer: MEDICARE

## 2020-03-06 ENCOUNTER — TELEPHONE (OUTPATIENT)
Dept: UROLOGY | Facility: MEDICAL CENTER | Age: 70
End: 2020-03-06

## 2020-03-06 VITALS
HEART RATE: 68 BPM | DIASTOLIC BLOOD PRESSURE: 72 MMHG | OXYGEN SATURATION: 92 % | HEIGHT: 66 IN | RESPIRATION RATE: 18 BRPM | WEIGHT: 195 LBS | SYSTOLIC BLOOD PRESSURE: 122 MMHG | BODY MASS INDEX: 31.34 KG/M2

## 2020-03-06 DIAGNOSIS — I10 ESSENTIAL HYPERTENSION: ICD-10-CM

## 2020-03-06 DIAGNOSIS — C91.10 CLL (CHRONIC LYMPHOCYTIC LEUKEMIA) (HCC): ICD-10-CM

## 2020-03-06 DIAGNOSIS — E87.1 HYPONATREMIA: ICD-10-CM

## 2020-03-06 DIAGNOSIS — Z00.00 MEDICARE ANNUAL WELLNESS VISIT, SUBSEQUENT: Primary | ICD-10-CM

## 2020-03-06 DIAGNOSIS — D50.9 IRON DEFICIENCY ANEMIA, UNSPECIFIED IRON DEFICIENCY ANEMIA TYPE: ICD-10-CM

## 2020-03-06 DIAGNOSIS — F32.A MILD DEPRESSION: ICD-10-CM

## 2020-03-06 DIAGNOSIS — E78.00 HYPERCHOLESTEROLEMIA: ICD-10-CM

## 2020-03-06 DIAGNOSIS — I27.20 PULMONARY HYPERTENSION (HCC): ICD-10-CM

## 2020-03-06 DIAGNOSIS — E66.9 OBESITY, CLASS I, BMI 30-34.9: ICD-10-CM

## 2020-03-06 DIAGNOSIS — R33.9 URINARY RETENTION: ICD-10-CM

## 2020-03-06 DIAGNOSIS — R16.1 SPLENOMEGALY: ICD-10-CM

## 2020-03-06 DIAGNOSIS — K21.9 GASTROESOPHAGEAL REFLUX DISEASE WITHOUT ESOPHAGITIS: ICD-10-CM

## 2020-03-06 PROBLEM — F10.10 ALCOHOL ABUSE: Status: ACTIVE | Noted: 2020-03-06

## 2020-03-06 PROCEDURE — 1160F RVW MEDS BY RX/DR IN RCRD: CPT | Performed by: FAMILY MEDICINE

## 2020-03-06 PROCEDURE — G0439 PPPS, SUBSEQ VISIT: HCPCS | Performed by: FAMILY MEDICINE

## 2020-03-06 PROCEDURE — 4040F PNEUMOC VAC/ADMIN/RCVD: CPT | Performed by: FAMILY MEDICINE

## 2020-03-06 PROCEDURE — 99214 OFFICE O/P EST MOD 30 MIN: CPT | Performed by: FAMILY MEDICINE

## 2020-03-06 PROCEDURE — 1170F FXNL STATUS ASSESSED: CPT | Performed by: FAMILY MEDICINE

## 2020-03-06 PROCEDURE — 1125F AMNT PAIN NOTED PAIN PRSNT: CPT | Performed by: FAMILY MEDICINE

## 2020-03-06 PROCEDURE — 1036F TOBACCO NON-USER: CPT | Performed by: FAMILY MEDICINE

## 2020-03-06 PROCEDURE — 3074F SYST BP LT 130 MM HG: CPT | Performed by: FAMILY MEDICINE

## 2020-03-06 PROCEDURE — 3008F BODY MASS INDEX DOCD: CPT | Performed by: FAMILY MEDICINE

## 2020-03-06 PROCEDURE — 3078F DIAST BP <80 MM HG: CPT | Performed by: FAMILY MEDICINE

## 2020-03-06 RX ORDER — CARVEDILOL 12.5 MG/1
TABLET ORAL
Qty: 180 TABLET | Refills: 3 | Status: SHIPPED | OUTPATIENT
Start: 2020-03-06 | End: 2021-01-21 | Stop reason: HOSPADM

## 2020-03-06 RX ORDER — LOSARTAN POTASSIUM 100 MG/1
TABLET ORAL
Qty: 90 TABLET | Refills: 3 | Status: SHIPPED | OUTPATIENT
Start: 2020-03-06 | End: 2021-01-21 | Stop reason: HOSPADM

## 2020-03-06 NOTE — ASSESSMENT & PLAN NOTE
We did reach out to Urology today to make sure he has the appropriate supplies for his self catheterization

## 2020-03-06 NOTE — ASSESSMENT & PLAN NOTE
He has some mild splenomegaly noted on previous CT  Consider repeat imaging after his next visit with an ultrasound

## 2020-03-06 NOTE — TELEPHONE ENCOUNTER
[facility]  Dr Ana Nash office is calling and saying patient is out of medical supplies and they have a couple of questions if he can buy things from the store  Please call Tray Crow at [phone]

## 2020-03-06 NOTE — TELEPHONE ENCOUNTER
Called and spoke with patient  Patient inquiring about what he should you to wipe the head of the penis before he catheterizes  Informed patient that he can use antibacterial soap and water before catheterizing

## 2020-03-06 NOTE — PROGRESS NOTES
Assessment and Plan:     Problem List Items Addressed This Visit     None      Visit Diagnoses     Medicare annual wellness visit, subsequent    -  Primary    Obesity, Class I, BMI 30-34 9            BMI Counseling: Body mass index is 31 47 kg/m²  The BMI is above normal  Nutrition recommendations include encouraging healthy choices of fruits and vegetables  Exercise recommendations include moderate physical activity 150 minutes/week  Presents today for Medicare wellness visit  Overall, he is quite stable  I counseled him to moderate alcohol  He is up-to-date on vaccines  He is up-to-date on colon cancer screening  He is followed routinely by Urology in regards to his BPH and he has screen for prostate cancer through them  He has had no cognitive decline  He had screens negative for depression  Preventive health issues were discussed with patient, and age appropriate screening tests were ordered as noted in patient's After Visit Summary  Personalized health advice and appropriate referrals for health education or preventive services given if needed, as noted in patient's After Visit Summary       History of Present Illness:     Patient presents for Medicare Annual Wellness visit    Patient Care Team:  Edy Sierra MD as PCP - MD Jennifer Adrian MD     Problem List:     Patient Active Problem List   Diagnosis    Essential hypertension    CLL (chronic lymphocytic leukemia) ([address] [address]    Primary osteoarthritis of right hip    Allergic rhinitis due to pollen    Erectile dysfunction of non-organic origin    Esophageal reflux    Pulmonary hypertension ([address] )    Spinal stenosis of lumbar region    Enlarged prostate without lower urinary tract symptoms (luts)    Iron deficiency anemia    Insomnia    Arthritis of right hip    Abnormal EKG    DDD (degenerative disc disease), cervical    DIXIE not currently treated    Hypercholesterolemia    Mild depression ([address] )  History of colon polyps    Diverticulitis    Splenomegaly    Prostate cancer screening    BPH with obstruction/lower urinary tract symptoms      Past Medical and Surgical History:     Past Medical History:   Diagnosis Date    Anxiety     BPH (benign prostatic hypertrophy)     CLL [facility]chronic [facility]ocytic leukemia) [facility]     2009    Colon polyp     Concussion     Resolved: 08/22/16    Depression     Gastrointestinal hemorrhage     Last assessed: 08/27/13    GERD (gastroesophageal reflux disease)     Hearing loss of aging     Hiatal hernia     History of transfusion     Hyperlipidemia     Hypertension     Iron deficiency anemia     Microscopic hematuria     Last assessed: 06/28/13    OA (osteoarthritis)     right hip    Pneumothorax     Prostatitis     Pulmonary hypertension (HCC)     Seasonal allergies     UTI (urinary tract infection)     in past    Wears glasses      Past Surgical History:   Procedure Laterality Date    COLONOSCOPY      CYSTOSCOPY  10/09/2014    Diagnostic    FRACTURE SURGERY      left lower arm    FRACTURE SURGERY      left femur    HERNIA REPAIR      JOINT REPLACEMENT Right     hip    OTHER SURGICAL HISTORY  03/2011    Spinal anesthesia epidural    NV TOTAL HIP ARTHROPLASTY Right 9/29/2017    Procedure: ARTHROPLASTY HIP TOTAL ANTERIOR;  Surgeon: Natalie Gross MD;  Location: [facility];  Service: Orthopedics    TONSILLECTOMY AND ADENOIDECTOMY      TRANSURETHRAL RESECTION OF PROSTATE      x 2    WRIST SURGERY        Family History:     Family History   Problem Relation Age of Onset    No Known Problems Mother     Heart attack Father     Diabetes Brother     Prostate cancer Brother       Social History:        Social History     Socioeconomic History    Marital status: /Civil Union     Spouse name: None    Number of children: None    Years of education: None    Highest education level: None   Occupational History    None   Social Needs    Financial resource strain: None    Food insecurity:     Worry: None     Inability: None    Transportation needs:     Medical: None     Non-medical: None   Tobacco Use    Smoking status: Former Smoker     Packs/day: 1 00     Years: 15      Pack years: 15      Types: Cigarettes     Start date:      Last attempt to quit: 1980     Years since quittin 5    Smokeless tobacco: Never Used   Substance and Sexual Activity    Alcohol use:  Yes     Alcohol/week: 10 0 standard drinks     Types: 10 Cans of beer per week     Frequency: 4 or more times a week     Drinks per session: 3 or 4    Drug use: Not Currently     Types: Marijuana    Sexual activity: Not Currently   Lifestyle    Physical activity:     Days per week: None     Minutes per session: None    Stress: None   Relationships    Social connections:     Talks on phone: None     Gets together: None     Attends Taoism service: None     Active member of club or organization: None     Attends meetings of clubs or organizations: None     Relationship status: None    Intimate partner violence:     Fear of current or ex partner: None     Emotionally abused: None     Physically abused: None     Forced sexual activity: None   Other Topics Concern    None   Social History Narrative    None      Medications and Allergies:     Current Outpatient Medications   Medication Sig Dispense Refill    amLODIPine (NORVASC) 5 mg tablet Take 1 tablet (5 mg total) by mouth every evening 90 tablet 3    aspirin 81 MG tablet Take 1 tablet by mouth daily      atorvastatin (LIPITOR) 20 mg tablet Take 1 tablet (20 mg total) by mouth daily 90 tablet 3    carvedilol (COREG) 12 5 mg tablet Take 1 tablet (12 5 mg total) by mouth 2 (two) times a day with meals 180 tablet 3    fluticasone (FLONASE) 50 mcg/act nasal spray 2 sprays into each nostril daily as needed for allergies 3 Bottle 3    losartan (COZAAR) 100 MG tablet Take 1 tablet (100 mg total) by mouth daily 90 tablet 3    Milk Thistle 1000 MG CAPS Take 250 mg by mouth       minoxidil (LONITEN) 10 mg tablet TAKE 1 TABLET BY MOUTH ONCE DAILY 90 tablet 3    montelukast (SINGULAIR) 10 mg tablet Take 1 tablet (10 mg total) by mouth daily 90 tablet 3    naproxen (EC NAPROSYN) 500 MG EC tablet Take 1 tablet (500 mg total) by mouth 2 (two) times a day with meals (Patient taking differently: Take 500 mg by mouth as needed ) 60 tablet 2    Omega-3 Fatty Acids (FISH OIL) 1,000 mg Take by mouth daily      omeprazole (PriLOSEC) 40 MG capsule Take 1 capsule (40 mg total) by mouth daily 90 capsule 3    sertraline (ZOLOFT) 50 mg tablet Take 1 tablet (50 mg total) by mouth daily 90 tablet 3    tamsulosin (FLOMAX) 0 4 mg Take 2 capsules (0 8 mg total) by mouth daily with dinner 60 capsule 5    zolpidem (AMBIEN) 10 mg tablet TAKE ONE TABLET BY MOUTH AT BEDTIME AS NEEDED FOR SLEEP 90 tablet 0     No current facility-administered medications for this visit  Allergies   Allergen Reactions    Codeine Other (See Comments)     agitated    Bactrim [Sulfamethoxazole-Trimethoprim] GI Intolerance      Immunizations:     Immunization History   Administered Date(s) Administered    INFLUENZA 09/21/2015    Influenza Quadrivalent Preservative Free 3 years and older IM 10/14/2016, 09/15/2017    Influenza Split Preservative Free ID 09/10/2013    Influenza TIV (IM) 08/31/2012, 09/23/2014    Influenza, high dose seasonal 0 5 mL 10/01/2018, 10/15/2019    Pneumococcal Conjugate 13-Valent 10/14/2016    Pneumococcal Polysaccharide PPV23 09/10/2013, 08/30/2019    Tdap 08/22/2016    Zoster 04/29/2015, 08/20/2018    Zoster Vaccine Recombinant 08/20/2018, 10/19/2018      Health Maintenance:         Topic Date Due    CRC Screening: Colonoscopy  10/23/2028    Hepatitis C Screening  Completed     There are no preventive care reminders to display for this patient     Medicare Health Risk Assessment:     /72   Pulse 68   Resp 18   Ht 5' 6" (1 676 m)   Wt 88 5 kg (195 lb)   SpO2 92%   BMI 31 47 kg/m²      Fredda Severance is here for his Subsequent Wellness visit  Health Risk Assessment:   Patient rates overall health as good  Patient feels that their physical health rating is same  Eyesight was rated as same  Hearing was rated as same  Patient feels that their emotional and mental health rating is same  Pain experienced in the last 7 days has been none  Depression Screening:   PHQ-2 Score: 0  PHQ-9 Score: 0      Fall Risk Screening: In the past year, patient has experienced: no history of falling in past year      Home Safety:  Patient does not have trouble with stairs inside or outside of their home  Patient has working smoke alarms and has no working carbon monoxide detector  Home safety hazards include: none  Nutrition:   Current diet is Regular  Medications:   Patient is currently taking over-the-counter supplements  OTC medications include: see medication list  Patient is able to manage medications  Activities of Daily Living (ADLs)/Instrumental Activities of Daily Living (IADLs):   Walk and transfer into and out of bed and chair?: Yes  Dress and groom yourself?: Yes    Bathe or shower yourself?: Yes    Feed yourself? Yes  Do your laundry/housekeeping?: Yes  Manage your money, pay your bills and track your expenses?: Yes  Make your own meals?: Yes    Do your own shopping?: Yes    Previous Hospitalizations:   Any hospitalizations or ED visits within the last 12 months?: Yes    How many hospitalizations have you had in the last year?: 1-2    Advance Care Planning:   Living will: Yes    Durable POA for healthcare:  Yes    Advanced directive: Yes      Cognitive Screening:   Provider or family/friend/caregiver concerned regarding cognition?: No    PREVENTIVE SCREENINGS      Cardiovascular Screening:    General: Screening Not Indicated and History Lipid Disorder      Diabetes Screening:     General: Screening Current      Colorectal Cancer Screening:     General: Screening Current      Prostate Cancer Screening:    General: Screening Current      Osteoporosis Screening:    General: Screening Not Indicated      Abdominal Aortic Aneurysm (AAA) Screening:    Risk factors include: age between 73-67 yo and tobacco use        Lung Cancer Screening:     General: Screening Not Indicated      Hepatitis C Screening:    General: Screening Current    Other Counseling Topics:   Alcohol use counseling         Inis Leventhal, MD

## 2020-03-06 NOTE — PROGRESS NOTES
Assessment/Plan:       Problem List Items Addressed This Visit        Digestive    Esophageal reflux     Continue on PPI  Cardiovascular and Mediastinum    Essential hypertension    Relevant Orders    Comprehensive metabolic panel    Pulmonary hypertension ([address] )    Relevant Orders    Comprehensive metabolic panel       Genitourinary    Urinary retention     We did reach out to Urology today to make sure he has the appropriate supplies for his self catheterization  Other    CLL (chronic lymphocytic leukemia) (HCC)    Relevant Orders    CBC and differential    Iron deficiency anemia    Relevant Orders    Comprehensive metabolic panel    Hypercholesterolemia    Relevant Orders    Comprehensive metabolic panel    Mild depression ([address] )    Splenomegaly     He has some mild splenomegaly noted on previous CT  Consider repeat imaging after his next visit with an ultrasound  Relevant Orders    CBC and differential      Other Visit Diagnoses     Medicare annual wellness visit, subsequent    -  Primary    Obesity, Class I, BMI 30-34 9                Subjective:      Patient ID: Nissa Khalil is a 81 y o  male  HPI patient presents today for follow-up for chronic health issues  Blood pressure actually is very well controlled  He brought a lot of readings from home the look excellent  He is not having chest pain, shortness of breath or palpitations  Denies lower extremity swelling  He is back on his omeprazole routinely for his reflux  He does have a history of chronic hyponatremia which we have been monitoring  This is likely from his SSRI  He has a history of CLL and follows with oncology  He denies unintentional weight loss or night sweats  He has an enlarged prostate has been catheterized himself twice daily  He had some urinary burning but recent urine culture was negative  Mood remained stable on low-dose sertraline  He does continue to drink about 6 beers daily      The following portions of the patient's history were reviewed and updated as appropriate: allergies, current medications, past family history, past medical history, past social history, past surgical history and problem list       Current Outpatient Medications:     amLODIPine (NORVASC) 5 mg tablet, Take 1 tablet (5 mg total) by mouth every evening, Disp: 90 tablet, Rfl: 3    aspirin 81 MG tablet, Take 1 tablet by mouth daily, Disp: , Rfl:     atorvastatin (LIPITOR) 20 mg tablet, Take 1 tablet (20 mg total) by mouth daily, Disp: 90 tablet, Rfl: 3    carvedilol (COREG) 12 5 mg tablet, Take 1 tablet (12 5 mg total) by mouth 2 (two) times a day with meals, Disp: 180 tablet, Rfl: 3    fluticasone (FLONASE) 50 mcg/act nasal spray, 2 sprays into each nostril daily as needed for allergies, Disp: 3 Bottle, Rfl: 3    losartan (COZAAR) 100 MG tablet, Take 1 tablet (100 mg total) by mouth daily, Disp: 90 tablet, Rfl: 3    Milk Thistle 1000 MG CAPS, Take 250 mg by mouth , Disp: , Rfl:     minoxidil (LONITEN) 10 mg tablet, TAKE 1 TABLET BY MOUTH ONCE DAILY, Disp: 90 tablet, Rfl: 3    montelukast (SINGULAIR) 10 mg tablet, Take 1 tablet (10 mg total) by mouth daily, Disp: 90 tablet, Rfl: 3    naproxen (EC NAPROSYN) 500 MG EC tablet, Take 1 tablet (500 mg total) by mouth 2 (two) times a day with meals (Patient taking differently: Take 500 mg by mouth as needed ), Disp: 60 tablet, Rfl: 2    Omega-3 Fatty Acids (FISH OIL) 1,000 mg, Take by mouth daily, Disp: , Rfl:     omeprazole (PriLOSEC) 40 MG capsule, Take 1 capsule (40 mg total) by mouth daily, Disp: 90 capsule, Rfl: 3    sertraline (ZOLOFT) 50 mg tablet, Take 1 tablet (50 mg total) by mouth daily, Disp: 90 tablet, Rfl: 3    tamsulosin (FLOMAX) 0 4 mg, Take 2 capsules (0 8 mg total) by mouth daily with dinner, Disp: 60 capsule, Rfl: 5    zolpidem (AMBIEN) 10 mg tablet, TAKE ONE TABLET BY MOUTH AT BEDTIME AS NEEDED FOR SLEEP, Disp: 90 tablet, Rfl: 0     Review of Systems   Constitutional: Negative for appetite change, chills, fatigue, fever and unexpected weight change  HENT: Negative for congestion and trouble swallowing  Eyes: Negative for visual disturbance  Respiratory: Negative for cough, chest tightness, shortness of breath and wheezing  Cardiovascular: Negative for chest pain, palpitations and leg swelling  Gastrointestinal: Negative for abdominal distention, abdominal pain, blood in stool, constipation and diarrhea  Endocrine: Negative for polyuria  Genitourinary: Negative for difficulty urinating and flank pain  Musculoskeletal: Negative for arthralgias and myalgias  Skin: Negative for rash  Neurological: Negative for dizziness and light-headedness  Hematological: Negative for adenopathy  Does not bruise/bleed easily  Psychiatric/Behavioral: Negative for dysphoric mood and sleep disturbance  The patient is not nervous/anxious  Objective:      /72   Pulse 68   Resp 18   Ht 5' 6" (1 676 m)   Wt 88 5 kg (195 lb)   SpO2 92%   BMI 31 47 kg/m²          Physical Exam   Constitutional: He is oriented to person, place, and time  He appears well-developed and well-nourished  HENT:   Head: Normocephalic  Eyes: Right eye exhibits no discharge  Cardiovascular: Regular rhythm and normal heart sounds  No murmur heard  Pulmonary/Chest: No respiratory distress  He has no wheezes  He has no rales  Abdominal: He exhibits no distension  There is no tenderness  Neurological: He is alert and oriented to person, place, and time  Skin: No rash noted  No erythema  Vitals reviewed          Dory Gresham MD

## 2020-03-09 ENCOUNTER — TELEPHONE (OUTPATIENT)
Dept: FAMILY MEDICINE CLINIC | Facility: CLINIC | Age: 70
End: 2020-03-09

## 2020-03-20 ENCOUNTER — TELEPHONE (OUTPATIENT)
Dept: UROLOGY | Facility: CLINIC | Age: 70
End: 2020-03-20

## 2020-03-20 NOTE — TELEPHONE ENCOUNTER
Spoke to patient today  Cystoscopy for Monday March 23, 2020 postpone for 4-6 weeks  Patient is amenable with this plan  He is continuing to do clean intermittent catheterization at this time

## 2020-04-13 DIAGNOSIS — F32.A DEPRESSION, UNSPECIFIED DEPRESSION TYPE: ICD-10-CM

## 2020-04-29 DIAGNOSIS — F51.01 PRIMARY INSOMNIA: ICD-10-CM

## 2020-04-30 RX ORDER — ZOLPIDEM TARTRATE 10 MG/1
TABLET ORAL
Qty: 90 TABLET | Refills: 0 | Status: SHIPPED | OUTPATIENT
Start: 2020-04-30 | End: 2021-01-21 | Stop reason: HOSPADM

## 2020-06-29 ENCOUNTER — PROCEDURE VISIT (OUTPATIENT)
Dept: UROLOGY | Facility: CLINIC | Age: 70
End: 2020-06-29
Payer: MEDICARE

## 2020-06-29 VITALS
DIASTOLIC BLOOD PRESSURE: 82 MMHG | HEIGHT: 66 IN | TEMPERATURE: 98 F | HEART RATE: 62 BPM | SYSTOLIC BLOOD PRESSURE: 150 MMHG | BODY MASS INDEX: 31.66 KG/M2 | WEIGHT: 197 LBS

## 2020-06-29 DIAGNOSIS — N40.1 BPH WITH OBSTRUCTION/LOWER URINARY TRACT SYMPTOMS: Primary | ICD-10-CM

## 2020-06-29 DIAGNOSIS — N13.8 BPH WITH OBSTRUCTION/LOWER URINARY TRACT SYMPTOMS: Primary | ICD-10-CM

## 2020-06-29 DIAGNOSIS — R33.9 URINARY RETENTION: ICD-10-CM

## 2020-06-29 LAB
SL AMB  POCT GLUCOSE, UA: NORMAL
SL AMB LEUKOCYTE ESTERASE,UA: NORMAL
SL AMB POCT BILIRUBIN,UA: NORMAL
SL AMB POCT BLOOD,UA: NORMAL
SL AMB POCT CLARITY,UA: NORMAL
SL AMB POCT COLOR,UA: YELLOW
SL AMB POCT KETONES,UA: NORMAL
SL AMB POCT NITRITE,UA: NORMAL
SL AMB POCT PH,UA: 6
SL AMB POCT SPECIFIC GRAVITY,UA: 1
SL AMB POCT URINE PROTEIN: NORMAL
SL AMB POCT UROBILINOGEN: 0.2

## 2020-06-29 PROCEDURE — 52000 CYSTOURETHROSCOPY: CPT | Performed by: UROLOGY

## 2020-06-29 PROCEDURE — 81002 URINALYSIS NONAUTO W/O SCOPE: CPT | Performed by: UROLOGY

## 2020-07-06 ENCOUNTER — APPOINTMENT (OUTPATIENT)
Dept: LAB | Age: 70
End: 2020-07-06
Payer: MEDICARE

## 2020-07-06 DIAGNOSIS — C91.10 CLL (CHRONIC LYMPHOCYTIC LEUKEMIA) (HCC): ICD-10-CM

## 2020-07-06 DIAGNOSIS — R16.1 SPLENOMEGALY: ICD-10-CM

## 2020-07-06 DIAGNOSIS — I10 ESSENTIAL HYPERTENSION: ICD-10-CM

## 2020-07-06 DIAGNOSIS — E78.00 HYPERCHOLESTEROLEMIA: ICD-10-CM

## 2020-07-06 DIAGNOSIS — D50.9 IRON DEFICIENCY ANEMIA, UNSPECIFIED IRON DEFICIENCY ANEMIA TYPE: ICD-10-CM

## 2020-07-06 DIAGNOSIS — I27.20 PULMONARY HYPERTENSION (HCC): ICD-10-CM

## 2020-07-06 LAB
ALBUMIN SERPL BCP-MCNC: 4.2 G/DL (ref 3.5–5)
ALP SERPL-CCNC: 90 U/L (ref 46–116)
ALT SERPL W P-5'-P-CCNC: 22 U/L (ref 12–78)
ANION GAP SERPL CALCULATED.3IONS-SCNC: 5 MMOL/L (ref 4–13)
ANISOCYTOSIS BLD QL SMEAR: PRESENT
AST SERPL W P-5'-P-CCNC: 19 U/L (ref 5–45)
BASOPHILS # BLD MANUAL: 0 THOUSAND/UL (ref 0–0.1)
BASOPHILS NFR MAR MANUAL: 0 % (ref 0–1)
BILIRUB SERPL-MCNC: 0.93 MG/DL (ref 0.2–1)
BUN SERPL-MCNC: 8 MG/DL (ref 5–25)
CALCIUM SERPL-MCNC: 8.7 MG/DL (ref 8.3–10.1)
CHLORIDE SERPL-SCNC: 99 MMOL/L (ref 100–108)
CO2 SERPL-SCNC: 28 MMOL/L (ref 21–32)
CREAT SERPL-MCNC: 0.86 MG/DL (ref 0.6–1.3)
EOSINOPHIL # BLD MANUAL: 0.29 THOUSAND/UL (ref 0–0.4)
EOSINOPHIL NFR BLD MANUAL: 1 % (ref 0–6)
ERYTHROCYTE [DISTWIDTH] IN BLOOD BY AUTOMATED COUNT: 14 % (ref 11.6–15.1)
FERRITIN SERPL-MCNC: 51 NG/ML (ref 8–388)
GFR SERPL CREATININE-BSD FRML MDRD: 90 ML/MIN/1.73SQ M
GLUCOSE P FAST SERPL-MCNC: 108 MG/DL (ref 65–99)
HCT VFR BLD AUTO: 37.4 % (ref 36.5–49.3)
HGB BLD-MCNC: 12.5 G/DL (ref 12–17)
LYMPHOCYTES # BLD AUTO: 25.95 THOUSAND/UL (ref 0.6–4.47)
LYMPHOCYTES # BLD AUTO: 88 % (ref 14–44)
MACROCYTES BLD QL AUTO: PRESENT
MCH RBC QN AUTO: 33.5 PG (ref 26.8–34.3)
MCHC RBC AUTO-ENTMCNC: 33.4 G/DL (ref 31.4–37.4)
MCV RBC AUTO: 100 FL (ref 82–98)
MONOCYTES # BLD AUTO: 0 THOUSAND/UL (ref 0–1.22)
MONOCYTES NFR BLD: 0 % (ref 4–12)
NEUTROPHILS # BLD MANUAL: 2.36 THOUSAND/UL (ref 1.85–7.62)
NEUTS SEG NFR BLD AUTO: 8 % (ref 43–75)
NRBC BLD AUTO-RTO: 0 /100 WBCS
PLATELET # BLD AUTO: 158 THOUSANDS/UL (ref 149–390)
PLATELET BLD QL SMEAR: ADEQUATE
PMV BLD AUTO: 9.5 FL (ref 8.9–12.7)
POTASSIUM SERPL-SCNC: 4 MMOL/L (ref 3.5–5.3)
PROT SERPL-MCNC: 8.4 G/DL (ref 6.4–8.2)
RBC # BLD AUTO: 3.73 MILLION/UL (ref 3.88–5.62)
RBC MORPH BLD: PRESENT
SMUDGE CELLS BLD QL SMEAR: PRESENT
SODIUM SERPL-SCNC: 132 MMOL/L (ref 136–145)
TOTAL CELLS COUNTED SPEC: 100
VARIANT LYMPHS # BLD AUTO: 3 %
WBC # BLD AUTO: 29.49 THOUSAND/UL (ref 4.31–10.16)

## 2020-07-06 PROCEDURE — 85027 COMPLETE CBC AUTOMATED: CPT

## 2020-07-06 PROCEDURE — 82728 ASSAY OF FERRITIN: CPT

## 2020-07-06 PROCEDURE — 80053 COMPREHEN METABOLIC PANEL: CPT

## 2020-07-06 PROCEDURE — 36415 COLL VENOUS BLD VENIPUNCTURE: CPT

## 2020-07-06 PROCEDURE — 85007 BL SMEAR W/DIFF WBC COUNT: CPT

## 2020-07-09 DIAGNOSIS — N13.8 BPH WITH OBSTRUCTION/LOWER URINARY TRACT SYMPTOMS: ICD-10-CM

## 2020-07-09 DIAGNOSIS — N40.1 BPH WITH OBSTRUCTION/LOWER URINARY TRACT SYMPTOMS: ICD-10-CM

## 2020-07-09 RX ORDER — TAMSULOSIN HYDROCHLORIDE 0.4 MG/1
CAPSULE ORAL
Qty: 60 CAPSULE | Refills: 5 | Status: SHIPPED | OUTPATIENT
Start: 2020-07-09 | End: 2020-09-01 | Stop reason: SDUPTHER

## 2020-07-10 ENCOUNTER — TELEPHONE (OUTPATIENT)
Dept: HEMATOLOGY ONCOLOGY | Facility: CLINIC | Age: 70
End: 2020-07-10

## 2020-07-13 ENCOUNTER — OFFICE VISIT (OUTPATIENT)
Dept: HEMATOLOGY ONCOLOGY | Facility: CLINIC | Age: 70
End: 2020-07-13
Payer: MEDICARE

## 2020-07-13 ENCOUNTER — HOSPITAL ENCOUNTER (OUTPATIENT)
Dept: INFUSION CENTER | Facility: CLINIC | Age: 70
Discharge: HOME | End: 2020-07-13
Payer: MEDICARE

## 2020-07-13 VITALS
HEART RATE: 65 BPM | WEIGHT: 188 LBS | SYSTOLIC BLOOD PRESSURE: 158 MMHG | OXYGEN SATURATION: 95 % | RESPIRATION RATE: 18 BRPM | HEIGHT: 66 IN | TEMPERATURE: 99 F | BODY MASS INDEX: 30.22 KG/M2 | DIASTOLIC BLOOD PRESSURE: 82 MMHG

## 2020-07-13 VITALS — TEMPERATURE: 97.2 F

## 2020-07-13 DIAGNOSIS — D50.9 IRON DEFICIENCY ANEMIA, UNSPECIFIED IRON DEFICIENCY ANEMIA TYPE: Primary | ICD-10-CM

## 2020-07-13 DIAGNOSIS — C91.10 CLL (CHRONIC LYMPHOCYTIC LEUKEMIA) (HCC): Primary | ICD-10-CM

## 2020-07-13 DIAGNOSIS — D50.9 IRON DEFICIENCY ANEMIA, UNSPECIFIED IRON DEFICIENCY ANEMIA TYPE: ICD-10-CM

## 2020-07-13 PROCEDURE — 1160F RVW MEDS BY RX/DR IN RCRD: CPT | Performed by: INTERNAL MEDICINE

## 2020-07-13 PROCEDURE — 99214 OFFICE O/P EST MOD 30 MIN: CPT | Performed by: INTERNAL MEDICINE

## 2020-07-13 PROCEDURE — 1036F TOBACCO NON-USER: CPT | Performed by: INTERNAL MEDICINE

## 2020-07-13 PROCEDURE — 3008F BODY MASS INDEX DOCD: CPT | Performed by: INTERNAL MEDICINE

## 2020-07-13 PROCEDURE — 4040F PNEUMOC VAC/ADMIN/RCVD: CPT | Performed by: INTERNAL MEDICINE

## 2020-07-13 PROCEDURE — 3079F DIAST BP 80-89 MM HG: CPT | Performed by: INTERNAL MEDICINE

## 2020-07-13 PROCEDURE — 96374 THER/PROPH/DIAG INJ IV PUSH: CPT

## 2020-07-13 PROCEDURE — 3077F SYST BP >= 140 MM HG: CPT | Performed by: INTERNAL MEDICINE

## 2020-07-13 RX ORDER — SODIUM CHLORIDE 9 MG/ML
20 INJECTION, SOLUTION INTRAVENOUS ONCE
Status: CANCELLED | OUTPATIENT
Start: 2020-07-13

## 2020-07-13 RX ORDER — SODIUM CHLORIDE 9 MG/ML
20 INJECTION, SOLUTION INTRAVENOUS ONCE
Status: CANCELLED | OUTPATIENT
Start: 2021-07-12

## 2020-07-13 RX ORDER — SODIUM CHLORIDE 9 MG/ML
20 INJECTION, SOLUTION INTRAVENOUS ONCE
Status: COMPLETED | OUTPATIENT
Start: 2020-07-13 | End: 2020-07-13

## 2020-07-13 RX ADMIN — SODIUM CHLORIDE 20 ML/HR: 0.9 INJECTION, SOLUTION INTRAVENOUS at 11:41

## 2020-07-13 NOTE — PLAN OF CARE
Problem: Potential for Falls  Goal: Patient will remain free of falls  Description  INTERVENTIONS:  - Assess patient frequently for physical needs  -  Identify cognitive and physical deficits and behaviors that affect risk of falls    -  [place] fall precautions as indicated by assessment   - Educate patient/family on patient safety including physical limitations  - Instruct patient to call for assistance with activity based on assessment  - Modify environment to reduce risk of injury  - Consider OT/PT consult to assist with strengthening/mobility  Outcome: Progressing
Praful Tran (Ricki)

## 2020-07-13 NOTE — PROGRESS NOTES
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 to the gastric vascular ectasia  He is on venofer maintenance therapy  He has no anemia  He has slowly progressive asymptomatic lymphocytosis  He has no thrombocytopenia  His ferritin is adequately controlled  Regarding his CLL, I do not think he need active treatment  I recommended him to continue with venofer once a year  I am going to check CBC in 6 months  I will see him again in a year with CBC, CMP and ferritin  He is in agreement with my recommendation                                      Subjective:      HPI:             Interval History:  A 81 year old gentleman with chronic lymphocytic leukemia diagnosed in March 2010  He has been on watchful waiting because he has absolutely no symptoms from hematology standpoint  Over the last few years, he had slowly progressive microcytic anemia  He was found to have iron deficiency   He was previously treated with IV iron with improvement of anemia   His last colonoscopy was in October 2018 which was negative   In early 2019, he developed iron deficiency anemia again  Catie Worrell, he underwent weekly venofer x5, followed by maintenance Venofer every 6 months  He previously underwent EGD which showed several gastric polyp as well as vascular ectasia  He presents today for routine follow-up  He has absolutely no new symptomatology  He denied fever, chills or night sweats  His weight is stable  His recent CBC showed no evidence anemia  Ferritin was 51  He has mild lymphocytosis  His platelet count is normal   He denied any respiratory symptoms  He has no complaint of pain    His performance status is normal                                  Objective:      Primary Diagnosis:     1  Chronic lymphocytic leukemia  Diagnosed in March 2010  CD-5, CD-23 positive  Kappa light chain positive  CD-38 negative  ZAP-70 positive disease   Diagnosed in 2010     2  Iron deficiency anemia      Cancer Staging:  No matching staging information was found for the patient         Previous Hematologic/ Oncologic Treatment:            Current Hematologic/ Oncologic Treatment:       Watchful waiting for CLL     Venofer once a year      Disease Status:      No evidence of progressive disease      Test Results:     Pathology:           Radiology:           Laboratory:     See below      Physical Exam:        General Appearance:    Alert, oriented          Eyes:    PERRL   Ears:    Normal external ear canals, both ears   Nose:   Nares normal, septum midline   Throat:   Mucosa moist  Pharynx without injection  Neck:   Supple         Lungs:     Clear to auscultation bilaterally   Chest Wall:    No tenderness or deformity    Heart:    Regular rate and rhythm         Abdomen:     Soft, non-tender, bowel sounds +, no organomegaly               Extremities:   Extremities no cyanosis or edema         Skin:   no rash or icterus  Lymph nodes:   Cervical, supraclavicular, and axillary nodes normal   Neurologic:   CNII-XII intact, normal strength, sensation and reflexes     Throughout           ROS: Review of Systems   All other systems reviewed and are negative  Imaging: No results found        Labs:   Lab Results   Component Value Date    WBC 29 49 (H) 07/06/2020    HGB 12 5 07/06/2020    HCT 37 4 07/06/2020     (H) 07/06/2020     07/06/2020     Lab Results   Component Value Date     (L) 07/20/2015    K 4 0 07/06/2020    CL 99 (L) 07/06/2020    CO2 28 07/06/2020    ANIONGAP 9 07/20/2015    BUN 8 07/06/2020    CREATININE 0 86 07/06/2020    GLUCOSE 119 07/20/2015    GLUF 108 (H) 07/06/2020    CALCIUM 8 7 07/06/2020    AST 19 07/06/2020    ALT 22 07/06/2020    ALKPHOS 90 07/06/2020    PROT 8 0 07/20/2015    BILITOT 0 47 07/20/2015    EGFR 90 07/06/2020         Lab Results   Component Value Date     02/18/2019         Lab Results   Component Value Date    PSA 0 2 01/30/2020    PSA 0 2 02/18/2019    PSA 0 1 02/12/2018         Lab Results   Component Value Date    FERRITIN 51 07/06/2020         Lab Results   Component Value Date    FOLATE 11 2 02/23/2017         Current Medications: Reviewed  Allergies: Reviewed  PMH/FH/SH:  Reviewed      Vital Sign:    Body surface area is 1 95 meters squared      Wt Readings from Last 3 Encounters:   07/13/20 85 3 kg (188 lb)   06/29/20 89 4 kg (197 lb)   03/06/20 88 5 kg (195 lb)        Temp Readings from Last 3 Encounters:   07/13/20 99 °F (37 2 °C) (Tympanic Core)   06/29/20 98 °F (36 7 °C)   01/13/20 (!) 97 3 °F (36 3 °C) (Tympanic)        BP Readings from Last 3 Encounters:   07/13/20 158/82   06/29/20 150/82   03/06/20 122/72         Pulse Readings from Last 3 Encounters:   07/13/20 65   06/29/20 62   03/06/20 68     @LASTSAO2(3)@

## 2020-08-03 DIAGNOSIS — I27.20 PULMONARY HYPERTENSION (HCC): ICD-10-CM

## 2020-08-03 RX ORDER — AMLODIPINE BESYLATE 5 MG/1
TABLET ORAL
Qty: 90 TABLET | Refills: 3 | Status: SHIPPED | OUTPATIENT
Start: 2020-08-03 | End: 2021-07-22

## 2020-08-13 ENCOUNTER — CONSULT (OUTPATIENT)
Dept: FAMILY MEDICINE CLINIC | Facility: CLINIC | Age: 70
End: 2020-08-13
Payer: MEDICARE

## 2020-08-13 VITALS
WEIGHT: 189 LBS | SYSTOLIC BLOOD PRESSURE: 132 MMHG | TEMPERATURE: 98.4 F | HEIGHT: 66 IN | HEART RATE: 65 BPM | RESPIRATION RATE: 20 BRPM | OXYGEN SATURATION: 97 % | BODY MASS INDEX: 30.37 KG/M2 | DIASTOLIC BLOOD PRESSURE: 70 MMHG

## 2020-08-13 DIAGNOSIS — H25.9 SENILE CATARACT OF LEFT EYE, UNSPECIFIED AGE-RELATED CATARACT TYPE: ICD-10-CM

## 2020-08-13 DIAGNOSIS — N13.8 BPH WITH OBSTRUCTION/LOWER URINARY TRACT SYMPTOMS: ICD-10-CM

## 2020-08-13 DIAGNOSIS — N40.1 BPH WITH OBSTRUCTION/LOWER URINARY TRACT SYMPTOMS: ICD-10-CM

## 2020-08-13 DIAGNOSIS — I10 ESSENTIAL HYPERTENSION: ICD-10-CM

## 2020-08-13 DIAGNOSIS — Z01.818 PRE-OP EXAM: Primary | ICD-10-CM

## 2020-08-13 PROCEDURE — 99214 OFFICE O/P EST MOD 30 MIN: CPT | Performed by: FAMILY MEDICINE

## 2020-08-13 RX ORDER — AMOXICILLIN 500 MG/1
CAPSULE ORAL
COMMUNITY
Start: 2020-08-12 | End: 2020-10-21 | Stop reason: ALTCHOICE

## 2020-08-13 NOTE — ASSESSMENT & PLAN NOTE
Blood pressure is under very good control  Appreciate the monitoring that he has been doing at home  He will continue amlodipine 5 mg, carvedilol 12 5 mg b i d , losartan 100 mg daily and minoxidil 10 mg daily

## 2020-08-13 NOTE — PROGRESS NOTES
Assessment/Plan:      I have given him clearance for left cataract surgery to be done August 26 barring any unforeseen illnesses between now and his surgical date  He has bruise right ankle when he walked into it a ladder  Recommended ice packs and elevation  Essential hypertension    Blood pressure is under very good control  Appreciate the monitoring that he has been doing at home  He will continue amlodipine 5 mg, carvedilol 12 5 mg b i d , losartan 100 mg daily and minoxidil 10 mg daily  Pulmonary hypertension ([facility] [address] )    He remains asymptomatic with pulmonary hypertension  He will continue follow-up with pulmonary  BPH with obstruction/lower urinary tract symptoms    He will continue self catheterization along with tamsulosin and follow-up with urology  [facility]    Thankfully this has been inactive  He will cont f/u with hem-onc and yearly Venofer infusions  Diagnoses and all orders for this visit:    Pre-op exam    Senile cataract of left eye, unspecified age-related cataract type    Essential hypertension    BPH with obstruction/lower urinary tract symptoms    Other orders  -     amoxicillin (AMOXIL) 500 mg capsule          Subjective:      Patient ID: Jayne Verduzco is a 81 y o  male  He is here for preop clearance prior to left cataract surgery which is scheduled August 26th  He had right eye done 2 years ago with good results  Right hip replacement was done about 3 years ago  He denies any side effects from anesthesia  He denies any viral symptoms  He has not been ill and nobody in the household has been ill  He has been carefully monitoring blood pressure at home over the past few months  The majority of readings are normal and occasionally he has systolic in the high 459U  He has been very stable with long history of CLL  He sees hematology yearly and is not needing any active treatment    He has been getting iron infusion yearly basis  He self caths bid in spite of TURPs x 2  He follows with urology regularly     He sees pulmonary for diagnosis of pulmonary hypertension discovered on echo  He is asymptomatic  He denies SOB with walking flat surface or 1 flight of stairs          The following portions of the patient's history were reviewed and updated as appropriate: allergies, current medications, past family history, past medical history, past social history, past surgical history and problem list     Review of Systems   Constitutional: Negative for activity change, chills, fatigue and fever  HENT: Positive for sneezing  Negative for congestion, ear pain, sinus pressure and sore throat  Eyes: Negative for pain and visual disturbance  Respiratory: Negative for cough, chest tightness, shortness of breath and wheezing  Cardiovascular: Negative for chest pain, palpitations and leg swelling  Gastrointestinal: Negative for abdominal pain, blood in stool, constipation, diarrhea, nausea and vomiting  Endocrine: Negative for polydipsia and polyuria  Genitourinary: Positive for difficulty urinating  Negative for dysuria, frequency and urgency  Musculoskeletal: Negative for arthralgias, joint swelling and myalgias  Skin: Negative for rash  Allergic/Immunologic: Positive for environmental allergies  Neurological: Negative for dizziness, weakness, numbness and headaches  Hematological: Negative for adenopathy  Does not bruise/bleed easily  Psychiatric/Behavioral: Negative for dysphoric mood  The patient is not nervous/anxious  Objective:      /70 (BP Location: Left arm, Patient Position: Sitting, Cuff Size: Large)   Pulse 65   Temp 98 4 °F (36 9 °C) (Temporal)   Resp 20   Ht 5' 6" (1 676 m)   Wt 85 7 kg (189 lb)   SpO2 97%   BMI 30 51 kg/m²          Physical Exam  Vitals signs and nursing note reviewed  Constitutional:       General: He is not in acute distress       Appearance: He is well-developed  He is obese  HENT:      Head: Normocephalic and atraumatic  Right Ear: Tympanic membrane and external ear normal       Left Ear: Tympanic membrane and external ear normal       Nose: Nose normal       Mouth/Throat:      Mouth: Mucous membranes are moist    Eyes:      General: No scleral icterus  Conjunctiva/sclera: Conjunctivae normal       Pupils: Pupils are equal, round, and reactive to light  Neck:      Musculoskeletal: Normal range of motion and neck supple  Thyroid: No thyromegaly  Cardiovascular:      Rate and Rhythm: Normal rate and regular rhythm  Pulses: Normal pulses  Heart sounds: Normal heart sounds  No murmur  Pulmonary:      Effort: Pulmonary effort is normal       Breath sounds: Normal breath sounds  No wheezing or rales  Abdominal:      General: Bowel sounds are normal       Palpations: Abdomen is soft  There is no mass  Tenderness: There is no abdominal tenderness  Musculoskeletal:         General: No tenderness or deformity  Comments:     Firm subcutaneous swelling right medial wrist    Lymphadenopathy:      Cervical: No cervical adenopathy  Skin:     General: Skin is warm and dry  Findings: Bruising present  No erythema or rash  Comments: There is a 2 x 3 cm  Area of swelling and ecchymosis right medial ankle  Neurological:      General: No focal deficit present  Mental Status: He is alert and oriented to person, place, and time  Cranial Nerves: No cranial nerve deficit  Deep Tendon Reflexes: Reflexes are normal and symmetric     Psychiatric:         Mood and Affect: Mood normal          Behavior: Behavior normal

## 2020-08-19 DIAGNOSIS — E78.00 HYPERCHOLESTEROLEMIA: ICD-10-CM

## 2020-08-19 DIAGNOSIS — K21.9 GASTROESOPHAGEAL REFLUX DISEASE, ESOPHAGITIS PRESENCE NOT SPECIFIED: ICD-10-CM

## 2020-08-19 RX ORDER — OMEPRAZOLE 40 MG/1
CAPSULE, DELAYED RELEASE ORAL
Qty: 90 CAPSULE | Refills: 3 | Status: SHIPPED | OUTPATIENT
Start: 2020-08-19 | End: 2021-01-21 | Stop reason: HOSPADM

## 2020-08-19 RX ORDER — ATORVASTATIN CALCIUM 20 MG/1
TABLET, FILM COATED ORAL
Qty: 90 TABLET | Refills: 3 | Status: SHIPPED | OUTPATIENT
Start: 2020-08-19 | End: 2021-01-21 | Stop reason: HOSPADM

## 2020-08-24 ENCOUNTER — OFFICE VISIT (OUTPATIENT)
Dept: PULMONOLOGY | Facility: CLINIC | Age: 70
End: 2020-08-24
Payer: MEDICARE

## 2020-08-24 VITALS
SYSTOLIC BLOOD PRESSURE: 140 MMHG | DIASTOLIC BLOOD PRESSURE: 76 MMHG | BODY MASS INDEX: 30.18 KG/M2 | OXYGEN SATURATION: 97 % | HEART RATE: 60 BPM | TEMPERATURE: 98.2 F | WEIGHT: 187 LBS

## 2020-08-24 DIAGNOSIS — I27.20 PULMONARY HYPERTENSION (HCC): Primary | ICD-10-CM

## 2020-08-24 DIAGNOSIS — S89.92XA INJURY OF LEFT LOWER EXTREMITY, INITIAL ENCOUNTER: ICD-10-CM

## 2020-08-24 PROCEDURE — 3077F SYST BP >= 140 MM HG: CPT | Performed by: INTERNAL MEDICINE

## 2020-08-24 PROCEDURE — 3078F DIAST BP <80 MM HG: CPT | Performed by: INTERNAL MEDICINE

## 2020-08-24 PROCEDURE — 1036F TOBACCO NON-USER: CPT | Performed by: INTERNAL MEDICINE

## 2020-08-24 PROCEDURE — 4040F PNEUMOC VAC/ADMIN/RCVD: CPT | Performed by: INTERNAL MEDICINE

## 2020-08-24 PROCEDURE — 1160F RVW MEDS BY RX/DR IN RCRD: CPT | Performed by: INTERNAL MEDICINE

## 2020-08-24 PROCEDURE — 99213 OFFICE O/P EST LOW 20 MIN: CPT | Performed by: INTERNAL MEDICINE

## 2020-08-24 NOTE — ASSESSMENT & PLAN NOTE
· Currently has a bruise and his left ankle from dropping a ladder on his ankle  Still has a fairly firm and slightly warm level of in duration at this site  · He was instructed to continue to monitor the swelling and redness  If the redness is extending and becomes more warm or he notices tracking up his leg, he was instructed to notify either myself or his primary care physician  · At this time appears to be associated with the injury and bruising and is not frankly cellulitic    Will hold off on antibiotics

## 2020-08-24 NOTE — PROGRESS NOTES
Progress note - Pulmonary Medicine   Maricarmen Weber 81 y o  male MRN: [id number]       Impression & Plan:     Pulmonary hypertension ([address] )  · Still without symptoms  Maintains his activity level  · No leg swelling or increasing abdominal girth/bloating to suggest decompensated cor pulmonale  · It has been more than a year since his last echocardiogram   We discussed potentially repeating this  Given that his symptoms are largely unchanged, without plan to add medical intervention, we will continue an observational approach  We will hold on repeat echocardiogram at this time  · Patient is in agreement with this plan  Injury of left lower extremity  · Currently has a bruise and his left ankle from dropping a ladder on his ankle  Still has a fairly firm and slightly warm level of in duration at this site  · He was instructed to continue to monitor the swelling and redness  If the redness is extending and becomes more warm or he notices tracking up his leg, he was instructed to notify either myself or his primary care physician  · At this time appears to be associated with the injury and bruising and is not frankly cellulitic  Will hold off on antibiotics    Follow-up in 1 year unless change in pulmonary symptoms  ______________________________________________________________________    HPI:    Maricarmen Weber presents today for follow-up of pulmonary hypertension and obstructive sleep apnea  He is not treated for sleep apnea and does not report any symptoms or change in sleep habits  He has declined treatment for sleep apnea in the past     His pulmonary hypertension has been essentially asymptomatic  Prior to COVID-19 he was exercising at the gym and did not report any exercise limitation  He currently does not have any limitation to daily activities but is not exercising regularly because of COVID-19  He has not had any leg swelling    He denies any abdominal bloating or increasing abdominal girth     He does tell me that he had an injury to his left lower extremity with some bruising and some induration associated with dropping a ladder on his ankle  He is walking without difficulty and feels that the symptoms are slowly improving  The bruising has improved  He still has an area of firm induration however  He denies any cardiac complaints  No chest pain  No lightheadedness or dizziness  No headache  No neurologic complaints  No arthralgias or myalgias  No substantial weight or appetite changes        Current Medications:    Current Outpatient Medications:     amLODIPine (NORVASC) 5 mg tablet, TAKE ONE TABLET BY MOUTH EVERY EVENING, Disp: 90 tablet, Rfl: 3    amoxicillin (AMOXIL) 500 mg capsule, , Disp: , Rfl:     aspirin 81 MG tablet, Take 1 tablet by mouth daily, Disp: , Rfl:     atorvastatin (LIPITOR) 20 mg tablet, TAKE ONE TABLET BY MOUTH EVERY DAY, Disp: 90 tablet, Rfl: 3    carvedilol (COREG) 12 5 mg tablet, TAKE 1 TABLET BY MOUTH TWO TIMES A DAY WITH MEALS, Disp: 180 tablet, Rfl: 3    fluticasone (FLONASE) 50 mcg/act nasal spray, 2 sprays into each nostril daily as needed for allergies, Disp: 3 Bottle, Rfl: 3    losartan (COZAAR) 100 MG tablet, TAKE ONE TABLET BY MOUTH EVERY DAY, Disp: 90 tablet, Rfl: 3    Milk Thistle 1000 MG CAPS, Take 250 mg by mouth , Disp: , Rfl:     minoxidil (LONITEN) 10 mg tablet, TAKE 1 TABLET BY MOUTH ONCE DAILY, Disp: 90 tablet, Rfl: 3    naproxen (EC NAPROSYN) 500 MG EC tablet, Take 1 tablet (500 mg total) by mouth 2 (two) times a day with meals (Patient taking differently: Take 500 mg by mouth as needed ), Disp: 60 tablet, Rfl: 2    Omega-3 Fatty Acids (FISH OIL) 1,000 mg, Take by mouth daily, Disp: , Rfl:     omeprazole (PriLOSEC) 40 MG capsule, TAKE ONE CAPSULE BY MOUTH EVERY DAY, Disp: 90 capsule, Rfl: 3    sertraline (ZOLOFT) 50 mg tablet, TAKE ONE TABLET BY MOUTH EVERY DAY, Disp: 90 tablet, Rfl: 3    tamsulosin (FLOMAX) 0 4 mg, TAKE TWO CAPSULES BY MOUTH DAILY WITH DINNER, Disp: 60 capsule, Rfl: 5    zolpidem (AMBIEN) 10 mg tablet, TAKE ONE TABLET BY MOUTH AT BEDTIME AS NEEDED SLEEP, Disp: 90 tablet, Rfl: 0    montelukast (SINGULAIR) 10 mg tablet, Take 1 tablet (10 mg total) by mouth daily, Disp: 90 tablet, Rfl: 3    Review of Systems:  Review of Systems   Constitutional: Negative for appetite change and unexpected weight change  Skin: Positive for color change ( bruising of the left ankle with induration)  All other systems reviewed and are negative  Past medical history, surgical history, and family history were reviewed and updated as appropriate    Social history updates:  Social History     Tobacco Use   Smoking Status Former Smoker    Packs/day:     Years: 15 00    Pack years: 15 00    Types: Cigarettes    Start date:     Last attempt to quit: 1980    Years since quittin 9   Smokeless Tobacco Never Used       PhysicalExamination:  Vitals:   /76   Pulse 60   Temp 98 2 °F (36 8 °C)   Wt 84 8 kg (187 lb)   SpO2 97%   BMI 30 18 kg/m²   Gen:  Obese, comfortable on room air  HEENT: PERRL  Oropharynx is crowded with a low-lying palate  There is no erythema or exudate  Neck: Stout  I do not appreciate any JVD or lymphadenopathy  Trachea is midline  No thyromegaly  Chest: Breath sounds are distant but clear to auscultation  There are no wheezes, rales or rhonchi  Cardiac: Regular rate and rhythm  There are no murmurs, rubs or gallops  Abdomen: Obese  Soft and nontender  Extremities:  He does have a warm area of induration above the ankle on the left  This is about 3-4 cm in diameter  There is distal and medial bruising  No open wounds  Neurologic: No focal deficits  Skin: No appreciable rashes  Diagnostic Data:  Labs:   I personally reviewed the most recent laboratory data pertinent to today's visit    Lab Results   Component Value Date    WBC 29 49 (H) 2020    HGB 12 5 2020 HCT 37 4 07/06/2020     (H) 07/06/2020     07/06/2020     Lab Results   Component Value Date    SODIUM 132 (L) 07/06/2020    K 4 0 07/06/2020    CO2 28 07/06/2020    CL 99 (L) 07/06/2020    BUN 8 07/06/2020    CREATININE 0 86 07/06/2020    CALCIUM 8 7 07/06/2020         Other studies:  No new or recent echocardiogram or imaging    Tuyet Quijano MD

## 2020-08-24 NOTE — ASSESSMENT & PLAN NOTE
· Still without symptoms  Maintains his activity level  · No leg swelling or increasing abdominal girth/bloating to suggest decompensated cor pulmonale  · It has been more than a year since his last echocardiogram   We discussed potentially repeating this  Given that his symptoms are largely unchanged, without plan to add medical intervention, we will continue an observational approach  We will hold on repeat echocardiogram at this time  · Patient is in agreement with this plan

## 2020-08-31 DIAGNOSIS — N40.1 BPH WITH OBSTRUCTION/LOWER URINARY TRACT SYMPTOMS: ICD-10-CM

## 2020-08-31 DIAGNOSIS — N13.8 BPH WITH OBSTRUCTION/LOWER URINARY TRACT SYMPTOMS: ICD-10-CM

## 2020-08-31 NOTE — TELEPHONE ENCOUNTER
Patient of Dr Oral Quinn seen at [facility] from [facility] requesting change to Tamsulosin  Only taking 1 pill a day   Also requesting 90 day supply    Patient can be reached at [phone]

## 2020-09-01 RX ORDER — TAMSULOSIN HYDROCHLORIDE 0.4 MG/1
0.4 CAPSULE ORAL
Qty: 90 CAPSULE | Refills: 1 | Status: SHIPPED | OUTPATIENT
Start: 2020-09-01 | End: 2021-02-17 | Stop reason: SDUPTHER

## 2020-09-01 NOTE — TELEPHONE ENCOUNTER
The patient has an upcoming office visit scheduled for 12/28/20 with Dr Rohini Stokes in the [place] [address] location but will run out of medication until then    Request for same, 90 day supply with 1 refill was queued and forwarded to the Advanced Practitioner covering the [place] [address] location for approval

## 2020-09-14 ENCOUNTER — TELEPHONE (OUTPATIENT)
Dept: UROLOGY | Facility: MEDICAL CENTER | Age: 70
End: 2020-09-14

## 2020-09-14 DIAGNOSIS — R39.15 URINARY URGENCY: Primary | ICD-10-CM

## 2020-09-14 DIAGNOSIS — N30.00 ACUTE CYSTITIS WITHOUT HEMATURIA: ICD-10-CM

## 2020-09-14 NOTE — TELEPHONE ENCOUNTER
Called and spoke with pt  He will be in this afternoon to  catheter  [address] was unable to get customer service on the phone  Burke Michelle will send documentation over that need to be signed by our office and ship pts catheter supplies      Pt made aware supplies will be mailed out to him this week

## 2020-09-14 NOTE — TELEPHONE ENCOUNTER
Received notification from Benedicto Burton   Medicare requires documentation for retention  Pts office notes from 6/29/2020 needs to be addend to support pt needing CIC      Will send to provider

## 2020-09-14 NOTE — TELEPHONE ENCOUNTER
Patient managed by Dr Jersey Alvraez  Patient called in stating he is out of catheter supplies  Patient stated [facility] informed him the order was  and they were awaiting order to be signed  Patient would like to  a catheter at the [facility] location this afternoon  Patient stated he needs a 14in speedy cath #[id number]   Patient can be reached at [phone]

## 2020-09-18 ENCOUNTER — OFFICE VISIT (OUTPATIENT)
Dept: FAMILY MEDICINE CLINIC | Facility: CLINIC | Age: 70
End: 2020-09-18
Payer: MEDICARE

## 2020-09-18 VITALS
WEIGHT: 187 LBS | RESPIRATION RATE: 18 BRPM | HEART RATE: 60 BPM | BODY MASS INDEX: 30.05 KG/M2 | SYSTOLIC BLOOD PRESSURE: 104 MMHG | TEMPERATURE: 98.4 F | DIASTOLIC BLOOD PRESSURE: 60 MMHG | OXYGEN SATURATION: 98 % | HEIGHT: 66 IN

## 2020-09-18 DIAGNOSIS — E78.00 HYPERCHOLESTEROLEMIA: ICD-10-CM

## 2020-09-18 DIAGNOSIS — D50.9 IRON DEFICIENCY ANEMIA, UNSPECIFIED IRON DEFICIENCY ANEMIA TYPE: ICD-10-CM

## 2020-09-18 DIAGNOSIS — E66.9 OBESITY, CLASS I, BMI 30-34.9: ICD-10-CM

## 2020-09-18 DIAGNOSIS — J30.1 ALLERGIC RHINITIS DUE TO POLLEN, UNSPECIFIED SEASONALITY: ICD-10-CM

## 2020-09-18 DIAGNOSIS — N40.1 BPH WITH OBSTRUCTION/LOWER URINARY TRACT SYMPTOMS: ICD-10-CM

## 2020-09-18 DIAGNOSIS — F32.A DEPRESSION, UNSPECIFIED DEPRESSION TYPE: ICD-10-CM

## 2020-09-18 DIAGNOSIS — E87.1 HYPONATREMIA: ICD-10-CM

## 2020-09-18 DIAGNOSIS — R73.9 HYPERGLYCEMIA: ICD-10-CM

## 2020-09-18 DIAGNOSIS — N40.0 ENLARGED PROSTATE WITHOUT LOWER URINARY TRACT SYMPTOMS (LUTS): ICD-10-CM

## 2020-09-18 DIAGNOSIS — F32.A MILD DEPRESSION: ICD-10-CM

## 2020-09-18 DIAGNOSIS — Z12.5 PROSTATE CANCER SCREENING: ICD-10-CM

## 2020-09-18 DIAGNOSIS — N13.8 BPH WITH OBSTRUCTION/LOWER URINARY TRACT SYMPTOMS: ICD-10-CM

## 2020-09-18 DIAGNOSIS — Z23 FLU VACCINE NEED: ICD-10-CM

## 2020-09-18 DIAGNOSIS — K21.9 GASTROESOPHAGEAL REFLUX DISEASE WITHOUT ESOPHAGITIS: ICD-10-CM

## 2020-09-18 DIAGNOSIS — I27.20 PULMONARY HYPERTENSION (HCC): ICD-10-CM

## 2020-09-18 DIAGNOSIS — F10.10 ALCOHOL ABUSE: ICD-10-CM

## 2020-09-18 DIAGNOSIS — C91.10 CLL (CHRONIC LYMPHOCYTIC LEUKEMIA) (HCC): ICD-10-CM

## 2020-09-18 DIAGNOSIS — I10 ESSENTIAL HYPERTENSION: Primary | ICD-10-CM

## 2020-09-18 PROBLEM — S89.92XA INJURY OF LEFT LOWER EXTREMITY: Status: RESOLVED | Noted: 2020-08-24 | Resolved: 2020-09-18

## 2020-09-18 PROCEDURE — 99214 OFFICE O/P EST MOD 30 MIN: CPT | Performed by: FAMILY MEDICINE

## 2020-09-18 PROCEDURE — 90662 IIV NO PRSV INCREASED AG IM: CPT

## 2020-09-18 PROCEDURE — G0008 ADMIN INFLUENZA VIRUS VAC: HCPCS

## 2020-09-18 NOTE — PROGRESS NOTES
Assessment/Plan:       Problem List Items Addressed This Visit        Digestive    Esophageal reflux     Stable at this time  Continue PPI  Respiratory    Allergic rhinitis due to pollen     Allergies are stable  Cardiovascular and Mediastinum    Essential hypertension - Primary     Blood pressure is well controlled at this time  Pulmonary hypertension ([facility] [address] )     He continues with pulmonary follow-up  He denies excessive shortness of breath currently  Genitourinary    BPH with obstruction/lower urinary tract symptoms     He continues to self-catheterize  Continue close follow-up with urology  Other    CLL (chronic lymphocytic leukemia) ([facility] [address] )     Continue routine Oncology follow-up         Enlarged prostate without lower urinary tract symptoms (luts)    Iron deficiency anemia     Follow with Hematology  Hypercholesterolemia    Relevant Orders    Lipid Panel with Direct LDL reflex    Mild depression (HCC)     Stable with PHQ 9 of 2  Prostate cancer screening    Relevant Orders    PSA, Total Screen    Hyponatremia    Alcohol abuse     Does continue to drink 3-4 beers daily  I encouraged him to cut back on his alcohol intake  Hyperglycemia     Check an A1c with next labs  Relevant Orders    Lipid Panel with Direct LDL reflex    Hemoglobin A1C      Other Visit Diagnoses     Obesity, Class I, BMI 30-34 9        Depression, unspecified depression type        Flu vaccine need        Relevant Orders    influenza vaccine, high-dose, PF 0 7 mL (FLUZONE HIGH-DOSE) (Completed)            Subjective:      Patient ID: Asiya Mondragon is a 78 y o  male  HPI patient presents today for follow-up for chronic health issues  Overall, he feels relatively well  Today is his birthday  He plans on having several beers  He has no acute complaints  He does have some chronic mild shortness of breath and has a history of some pulmonary hypertension  He denies any current issues chest pain or palpitations  He has a history of significant hypertension but blood pressure is actually have been well controlled and he denies lightheadedness  He has history of some mild depressive disorder but feels his mood has been relatively stable  He has history of CLL with some iron deficiency anemia and follows routinely with Hematology  He denies night sweats or unintentional weight loss  He does have some mild intermittent fatigue      The following portions of the patient's history were reviewed and updated as appropriate: allergies, current medications, past family history, past medical history, past social history, past surgical history and problem list       Current Outpatient Medications:     amLODIPine (NORVASC) 5 mg tablet, TAKE ONE TABLET BY MOUTH EVERY EVENING, Disp: 90 tablet, Rfl: 3    amoxicillin (AMOXIL) 500 mg capsule, , Disp: , Rfl:     aspirin 81 MG tablet, Take 1 tablet by mouth daily, Disp: , Rfl:     atorvastatin (LIPITOR) 20 mg tablet, TAKE ONE TABLET BY MOUTH EVERY DAY, Disp: 90 tablet, Rfl: 3    carvedilol (COREG) 12 5 mg tablet, TAKE 1 TABLET BY MOUTH TWO TIMES A DAY WITH MEALS, Disp: 180 tablet, Rfl: 3    fluticasone (FLONASE) 50 mcg/act nasal spray, 2 sprays into each nostril daily as needed for allergies, Disp: 3 Bottle, Rfl: 3    losartan (COZAAR) 100 MG tablet, TAKE ONE TABLET BY MOUTH EVERY DAY, Disp: 90 tablet, Rfl: 3    Milk Thistle 1000 MG CAPS, Take 250 mg by mouth , Disp: , Rfl:     minoxidil (LONITEN) 10 mg tablet, TAKE 1 TABLET BY MOUTH ONCE DAILY, Disp: 90 tablet, Rfl: 3    naproxen (EC NAPROSYN) 500 MG EC tablet, Take 1 tablet (500 mg total) by mouth 2 (two) times a day with meals (Patient taking differently: Take 500 mg by mouth as needed ), Disp: 60 tablet, Rfl: 2    Omega-3 Fatty Acids (FISH OIL) 1,000 mg, Take by mouth daily, Disp: , Rfl:     omeprazole (PriLOSEC) 40 MG capsule, TAKE ONE CAPSULE BY MOUTH EVERY DAY, Disp: 90 capsule, Rfl: 3    sertraline (ZOLOFT) 50 mg tablet, TAKE ONE TABLET BY MOUTH EVERY DAY, Disp: 90 tablet, Rfl: 3    tamsulosin (FLOMAX) 0 4 mg, Take 1 capsule (0 4 mg total) by mouth daily with dinner CORRECTED SCRIPT! Note change in SIG and QUANTITY  Please process accordingly  Thank you! (9/1/20), Disp: 90 capsule, Rfl: 1    zolpidem (AMBIEN) 10 mg tablet, TAKE ONE TABLET BY MOUTH AT BEDTIME AS NEEDED SLEEP, Disp: 90 tablet, Rfl: 0    montelukast (SINGULAIR) 10 mg tablet, Take 1 tablet (10 mg total) by mouth daily, Disp: 90 tablet, Rfl: 3     Review of Systems   Constitutional: Negative for appetite change, chills, fatigue, fever and unexpected weight change  HENT: Negative for congestion and trouble swallowing  Eyes: Negative for visual disturbance  Respiratory: Positive for shortness of breath (Mild on occasion with exertion)  Negative for cough, chest tightness and wheezing  Cardiovascular: Negative for chest pain, palpitations and leg swelling  Gastrointestinal: Negative for abdominal distention, abdominal pain, blood in stool, constipation and diarrhea  Endocrine: Negative for polyuria  Genitourinary: Negative for difficulty urinating, flank pain and urgency  Musculoskeletal: Negative for arthralgias and myalgias  Skin: Negative for rash  Neurological: Negative for dizziness and light-headedness  Hematological: Negative for adenopathy  Does not bruise/bleed easily  Psychiatric/Behavioral: Negative for sleep disturbance  Objective:      /60   Pulse 60   Temp 98 4 °F (36 9 °C)   Resp 18   Ht 5' 6" (1 676 m)   Wt 84 8 kg (187 lb)   SpO2 98%   BMI 30 18 kg/m²          Physical Exam  Constitutional:       General: He is not in acute distress  Appearance: He is well-developed  He is not diaphoretic  HENT:      Head: Normocephalic  Eyes:      General:         Right eye: No discharge  Left eye: No discharge        Pupils: Pupils are equal, round, and reactive to light  Neck:      Thyroid: No thyromegaly  Trachea: No tracheal deviation  Cardiovascular:      Rate and Rhythm: Normal rate and regular rhythm  Heart sounds: Normal heart sounds  No murmur  Pulmonary:      Effort: Pulmonary effort is normal  No respiratory distress  Breath sounds: No wheezing or rales  Abdominal:      General: There is no distension  Palpations: Abdomen is soft  Tenderness: There is no abdominal tenderness  Musculoskeletal: Normal range of motion  Lymphadenopathy:      Cervical: No cervical adenopathy  Skin:     General: Skin is warm  Findings: No erythema  Neurological:      Mental Status: He is alert and oriented to person, place, and time  Cranial Nerves: No cranial nerve deficit  Psychiatric:         Thought Content:  Thought content normal          Judgment: Judgment normal            Jose Eduardo Beck MD

## 2020-10-19 NOTE — TELEPHONE ENCOUNTER
Patient is calling to say he is using catheters a little bit more than prescribed and he feels he will run out sooner  Please advise

## 2020-10-19 NOTE — TELEPHONE ENCOUNTER
Called and spoke with pt  Pt states he is cathing more frequency  Pt states the past few days he complains of urgency and frequency  Denies pain, fever, no other symptoms  Advised pt to go for urine testing to r/o infection  Encourage pt to hydrate with water  Pt states hes not really good at drinking water  Order placed for urine testing  Pt states he will go in the am     Advised message will be sent to provider for any further recommendations

## 2020-10-20 ENCOUNTER — LAB (OUTPATIENT)
Dept: LAB | Age: 70
End: 2020-10-20
Payer: MEDICARE

## 2020-10-20 DIAGNOSIS — R39.15 URINARY URGENCY: ICD-10-CM

## 2020-10-20 LAB
BACTERIA UR QL AUTO: ABNORMAL /HPF
BILIRUB UR QL STRIP: NEGATIVE
CLARITY UR: ABNORMAL
COLOR UR: YELLOW
GLUCOSE UR STRIP-MCNC: NEGATIVE MG/DL
HGB UR QL STRIP.AUTO: ABNORMAL
KETONES UR STRIP-MCNC: NEGATIVE MG/DL
LEUKOCYTE ESTERASE UR QL STRIP: ABNORMAL
NITRITE UR QL STRIP: NEGATIVE
NON-SQ EPI CELLS URNS QL MICRO: ABNORMAL /HPF
PH UR STRIP.AUTO: 7 [PH]
PROT UR STRIP-MCNC: ABNORMAL MG/DL
RBC #/AREA URNS AUTO: ABNORMAL /HPF
SP GR UR STRIP.AUTO: 1.01 (ref 1–1.03)
UROBILINOGEN UR QL STRIP.AUTO: 0.2 E.U./DL
WBC #/AREA URNS AUTO: ABNORMAL /HPF

## 2020-10-20 PROCEDURE — 81001 URINALYSIS AUTO W/SCOPE: CPT

## 2020-10-20 PROCEDURE — 87077 CULTURE AEROBIC IDENTIFY: CPT

## 2020-10-20 PROCEDURE — 87186 SC STD MICRODIL/AGAR DIL: CPT

## 2020-10-20 PROCEDURE — 87086 URINE CULTURE/COLONY COUNT: CPT

## 2020-10-21 RX ORDER — CEPHALEXIN 500 MG/1
500 CAPSULE ORAL 2 TIMES DAILY
Qty: 14 CAPSULE | Refills: 0 | Status: SHIPPED | OUTPATIENT
Start: 2020-10-21 | End: 2020-10-28

## 2020-10-21 NOTE — TELEPHONE ENCOUNTER
Called and informed patient about urine culture results  Patient stated that he will be out of catheters soon  Faxed an order [facility] to [facility] for [facility] catheters

## 2020-10-22 LAB — BACTERIA UR CULT: ABNORMAL

## 2020-11-16 DIAGNOSIS — E78.00 HYPERCHOLESTEREMIA: ICD-10-CM

## 2020-11-18 RX ORDER — MINOXIDIL 10 MG/1
TABLET ORAL
Qty: 90 TABLET | Refills: 3 | Status: SHIPPED | OUTPATIENT
Start: 2020-11-18 | End: 2021-01-21 | Stop reason: HOSPADM

## 2020-11-24 ENCOUNTER — TELEMEDICINE (OUTPATIENT)
Dept: FAMILY MEDICINE CLINIC | Facility: CLINIC | Age: 70
End: 2020-11-24
Payer: MEDICARE

## 2020-11-24 DIAGNOSIS — B34.9 VIRAL INFECTION, UNSPECIFIED: ICD-10-CM

## 2020-11-24 DIAGNOSIS — B34.9 VIRAL INFECTION, UNSPECIFIED: Primary | ICD-10-CM

## 2020-11-24 PROCEDURE — U0003 INFECTIOUS AGENT DETECTION BY NUCLEIC ACID (DNA OR RNA); SEVERE ACUTE RESPIRATORY SYNDROME CORONAVIRUS 2 (SARS-COV-2) (CORONAVIRUS DISEASE [COVID-19]), AMPLIFIED PROBE TECHNIQUE, MAKING USE OF HIGH THROUGHPUT TECHNOLOGIES AS DESCRIBED BY CMS-2020-01-R: HCPCS | Performed by: INTERNAL MEDICINE

## 2020-11-24 PROCEDURE — 99213 OFFICE O/P EST LOW 20 MIN: CPT | Performed by: INTERNAL MEDICINE

## 2020-11-25 LAB — SARS-COV-2 RNA SPEC QL NAA+PROBE: NOT DETECTED

## 2021-01-04 ENCOUNTER — TELEMEDICINE (OUTPATIENT)
Dept: FAMILY MEDICINE CLINIC | Facility: CLINIC | Age: 71
End: 2021-01-04
Payer: MEDICARE

## 2021-01-04 VITALS — DIASTOLIC BLOOD PRESSURE: 68 MMHG | HEART RATE: 63 BPM | SYSTOLIC BLOOD PRESSURE: 129 MMHG

## 2021-01-04 DIAGNOSIS — B34.9 VIRAL INFECTION, UNSPECIFIED: Primary | ICD-10-CM

## 2021-01-04 DIAGNOSIS — B34.9 VIRAL INFECTION, UNSPECIFIED: ICD-10-CM

## 2021-01-04 PROCEDURE — U0003 INFECTIOUS AGENT DETECTION BY NUCLEIC ACID (DNA OR RNA); SEVERE ACUTE RESPIRATORY SYNDROME CORONAVIRUS 2 (SARS-COV-2) (CORONAVIRUS DISEASE [COVID-19]), AMPLIFIED PROBE TECHNIQUE, MAKING USE OF HIGH THROUGHPUT TECHNOLOGIES AS DESCRIBED BY CMS-2020-01-R: HCPCS | Performed by: PHYSICIAN ASSISTANT

## 2021-01-04 PROCEDURE — 99213 OFFICE O/P EST LOW 20 MIN: CPT | Performed by: PHYSICIAN ASSISTANT

## 2021-01-04 NOTE — PROGRESS NOTES
COVID-19 Virtual Visit     Assessment/Plan:    Problem List Items Addressed This Visit     None      Visit Diagnoses     Viral infection, unspecified    -  Primary    Relevant Orders    Novel Coronavirus (COVID-19), PCR [facility] - Collected at [facility] or [facility]         Disposition:     I referred patient to one of our centralized sites for a COVID-19 swab  Patient was directed to go for testing for COVID and then isolate at home  Call if any new or worsening prior to contacted with results  I have spent 15 minutes directly with the patient  Encounter provider Martir Escobar PA-C    Provider located at [facility] [id number]    Recent Visits  Date Type Provider Dept   01/04/21 [facility], [address] [address] Primary Care   Showing recent visits within past 7 days and meeting all other requirements     Future Appointments  No visits were found meeting these conditions  Showing future appointments within next 150 days and meeting all other requirements      This virtual check-in was done via [employer] and patient was informed that this is not a secure, HIPAA-compliant platform  He agrees to proceed  Patient agrees to participate in a virtual check in via telephone or video visit instead of presenting to the office to address urgent/immediate medical needs  Patient is aware this is a billable service  After connecting through [facility], the patient was identified by name and date of birth  Joyce Meehan was informed that this was a telemedicine visit and that the exam was being conducted confidentially over secure lines  My office door was closed  No one else was in the room  Joyce Meehan acknowledged consent and understanding of privacy and security of the telemedicine visit   I informed the patient that I have reviewed his record in Epic and presented the opportunity for him to ask any questions regarding the visit today  The patient agreed to participate  Subjective:   Walker Parks is a 78 y o  male who is concerned about COVID-19  Date of symptom onset: 1/2/2021    Patient's symptoms include chills (slight), cough (dry - mild), nausea (subdided), vomiting and headache (slight)  Patient denies fever, fatigue, congestion, rhinorrhea, sore throat, anosmia, loss of taste, shortness of breath, chest tightness, abdominal pain, diarrhea and myalgias  Exposure:   Contact with a person who is under investigation (PUI) for or who is positive for COVID-19 within the last 14 days?: Yes  Date of exposure: 12/30/2020       Hospitalized recently for fever and/or lower respiratory symptoms?: No      Currently a healthcare worker that is involved in direct patient care?: No      Works in a special setting where the risk of COVID-19 transmission may be high? (this may include long-term care, correctional and group home facilities; homeless shelters; assisted-living facilities and group homes ): No      Resident in a special setting where the risk of COVID-19 transmission may be high? (this may include long-term care, correctional and group home facilities; homeless shelters; assisted-living facilities and group homes ): No      Watched [employer] concert with friends last week and saw a friend last Wednesday - notes that wife had cough, bodyaches, chills, etc   - patient is feeling better and she is improving too - took Delsym DM    Lab Results   Component Value Date    SARSCOV2 Not Detected 11/24/2020     Past Medical History:   Diagnosis Date    Anxiety     BPH (benign prostatic hypertrophy)     CLL (chronic lymphocytic leukemia) ([facility] [address] )     2009    Colon polyp     Concussion     Resolved: 08/22/16    Depression     Gastrointestinal hemorrhage     Last assessed: 08/27/13    GERD (gastroesophageal reflux disease)     Hearing loss of aging     Hiatal hernia     History of transfusion     Hyperlipidemia     Hypertension     Iron deficiency anemia     Microscopic hematuria     Last assessed: 06/28/13    OA (osteoarthritis)     right hip    Pneumothorax     Prostatitis     Pulmonary hypertension (HCC)     Seasonal allergies     UTI (urinary tract infection)     in past    Wears glasses      Past Surgical History:   Procedure Laterality Date    COLONOSCOPY      CYSTOSCOPY  10/09/2014    Diagnostic    CYSTOSCOPY  06/29/2020    FRACTURE SURGERY      left lower arm    FRACTURE SURGERY      left femur    HERNIA REPAIR      JOINT REPLACEMENT Right     hip    OTHER SURGICAL HISTORY  03/2011    Spinal anesthesia epidural    [facility] TOTAL HIP ARTHROPLASTY Right 9/29/2017    Procedure: ARTHROPLASTY HIP TOTAL ANTERIOR;  Surgeon: Aashish Reynoso MD;  Location: [facility];  Service: Orthopedics    TONSILLECTOMY AND ADENOIDECTOMY      TRANSURETHRAL RESECTION OF PROSTATE      x 2    WRIST SURGERY       Current Outpatient Medications   Medication Sig Dispense Refill    amLODIPine (NORVASC) 5 mg tablet TAKE ONE TABLET BY MOUTH EVERY EVENING 90 tablet 3    aspirin 81 MG tablet Take 1 tablet by mouth daily      atorvastatin (LIPITOR) 20 mg tablet TAKE ONE TABLET BY MOUTH EVERY DAY 90 tablet 3    carvedilol (COREG) 12 5 mg tablet TAKE 1 TABLET BY MOUTH TWO TIMES A DAY WITH MEALS 180 tablet 3    fluticasone (FLONASE) 50 mcg/act nasal spray 2 sprays into each nostril daily as needed for allergies 3 Bottle 3    losartan (COZAAR) 100 MG tablet TAKE ONE TABLET BY MOUTH EVERY DAY 90 tablet 3    Milk Thistle 1000 MG CAPS Take 250 mg by mouth       minoxidil (LONITEN) 10 mg tablet TAKE ONE TABLET BY MOUTH ONCE EVERY DAY 90 tablet 3    montelukast (SINGULAIR) 10 mg tablet Take 1 tablet (10 mg total) by mouth daily 90 tablet 3    naproxen (EC NAPROSYN) 500 MG EC tablet Take 1 tablet (500 mg total) by mouth 2 (two) times a day with meals (Patient taking differently: Take 500 mg by mouth as needed ) 60 tablet 2    Omega-3 Fatty Acids (FISH OIL) 1,000 mg Take by mouth daily      omeprazole (PriLOSEC) 40 MG capsule TAKE ONE CAPSULE BY MOUTH EVERY DAY 90 capsule 3    sertraline (ZOLOFT) 50 mg tablet TAKE ONE TABLET BY MOUTH EVERY DAY 90 tablet 3    tamsulosin (FLOMAX) 0 4 mg Take 1 capsule (0 4 mg total) by mouth daily with dinner CORRECTED SCRIPT! Note change in SIG and QUANTITY  Please process accordingly  Thank you! (9/1/20) 90 capsule 1    zolpidem (AMBIEN) 10 mg tablet TAKE ONE TABLET BY MOUTH AT BEDTIME AS NEEDED SLEEP 90 tablet 0     No current facility-administered medications for this visit  Allergies   Allergen Reactions    Codeine Other (See Comments)     agitated    Sulfamethoxazole-Trimethoprim GI Intolerance and Abdominal Pain     upset stomach       Review of Systems   Constitutional: Positive for chills (slight)  Negative for fatigue and fever  HENT: Negative for congestion, rhinorrhea and sore throat  Respiratory: Positive for cough (dry - mild)  Negative for chest tightness and shortness of breath  Gastrointestinal: Positive for nausea (subdided) and vomiting  Negative for abdominal pain and diarrhea  Musculoskeletal: Negative for myalgias  Neurological: Positive for headaches (slight)  Objective:    Vitals:    01/04/21 1430   BP: 129/68   Pulse: 63       Physical Exam  Vitals signs reviewed  Constitutional:       General: He is not in acute distress  Appearance: He is well-developed  Pulmonary:      Effort: Pulmonary effort is normal  No respiratory distress  Psychiatric:         Behavior: Behavior normal        VIRTUAL VISIT DISCLAIMER    Felipa Willams acknowledges that he has consented to an online visit or consultation   He understands that the online visit is based solely on information provided by him, and that, in the absence of a face-to-face physical evaluation by the physician, the diagnosis he receives is both limited and provisional in terms of accuracy and completeness  This is not intended to replace a full medical face-to-face evaluation by the physician  Reyna Arenas understands and accepts these terms

## 2021-01-05 LAB — SARS-COV-2 RNA SPEC QL NAA+PROBE: DETECTED

## 2021-01-06 ENCOUNTER — TELEPHONE (OUTPATIENT)
Dept: FAMILY MEDICINE CLINIC | Facility: CLINIC | Age: 71
End: 2021-01-06

## 2021-01-06 DIAGNOSIS — R05.9 COUGH: Primary | ICD-10-CM

## 2021-01-06 DIAGNOSIS — U07.1 COVID-19: ICD-10-CM

## 2021-01-06 RX ORDER — GUAIFENESIN/DEXTROMETHORPHAN 100-10MG/5
5 SYRUP ORAL 3 TIMES DAILY PRN
Qty: 120 ML | Refills: 0 | Status: SHIPPED | OUTPATIENT
Start: 2021-01-06 | End: 2021-02-05 | Stop reason: ALTCHOICE

## 2021-01-06 RX ORDER — ALBUTEROL SULFATE 90 UG/1
3 AEROSOL, METERED RESPIRATORY (INHALATION) ONCE AS NEEDED
Status: CANCELLED | OUTPATIENT
Start: 2021-01-06

## 2021-01-06 RX ORDER — SODIUM CHLORIDE 9 MG/ML
20 INJECTION, SOLUTION INTRAVENOUS ONCE
Status: CANCELLED | OUTPATIENT
Start: 2021-01-06

## 2021-01-06 RX ORDER — ACETAMINOPHEN 325 MG/1
650 TABLET ORAL ONCE AS NEEDED
Status: CANCELLED | OUTPATIENT
Start: 2021-01-06

## 2021-01-06 NOTE — TELEPHONE ENCOUNTER
Patient called upset that he has not gotten a steroid called in for his coughing because he is COVID positive  He states the [facility] recommended this and he is very upset with this office and the people we hire! He said it is ridiculous that a patient can call at 11:15AM and not have any type of answer by 3PM  And now the pharmacy won't deliver  Advised him that the provider has been back to back, and he does not believe that we are "that busy"  He wants to know how he is going to sleep tonight and wants to take multiple Ambien and still wants a medication called in to [facility] on [address]  He expects a call back about what he needs to do and what has been called in  Admin note: Patient was asked to not use inappropriate language with staff  Acknowledged that I knew he was upset but that I was respectfully requesting he refrain from that  He understood but stands by the fact that he has something to be upset about!

## 2021-01-08 ENCOUNTER — TELEMEDICINE (OUTPATIENT)
Dept: FAMILY MEDICINE CLINIC | Facility: CLINIC | Age: 71
End: 2021-01-08
Payer: MEDICARE

## 2021-01-08 DIAGNOSIS — I27.20 PULMONARY HYPERTENSION (HCC): ICD-10-CM

## 2021-01-08 DIAGNOSIS — U07.1 COVID-19: Primary | ICD-10-CM

## 2021-01-08 PROCEDURE — 99442 PR PHYS/QHP TELEPHONE EVALUATION 11-20 MIN: CPT | Performed by: FAMILY MEDICINE

## 2021-01-08 NOTE — ASSESSMENT & PLAN NOTE
The patient is doing relatively well at this point  He has some significant fatigue and exertional shortness of breath  He will be getting monoclonal antibodies this weekend  It is not ideal that he will be that day 9, but we were not able to schedule him prior  He meets UA criteria as he will be infused prior to day 10

## 2021-01-08 NOTE — PROGRESS NOTES
COVID-19 Virtual Visit     Assessment/Plan:    Problem List Items Addressed This Visit        Cardiovascular and Mediastinum    Pulmonary hypertension ([address] )       Other    COVID-19 - Primary       The patient is doing relatively well at this point  He has some significant fatigue and exertional shortness of breath  He will be getting monoclonal antibodies this weekend  It is not ideal that he will be that day 9, but we were not able to schedule him prior  He meets UA criteria as he will be infused prior to day 10  Disposition:     Patient is a candidate for Bamlanivimab  They were counseled in regards to risks, benefits, and side effects of this infusion  Possible side effects of bamlanivimab are: Allergic reactions   Allergic reactions can happen during and after infusion with bamlanivimab which include:    Fever, chills, nausea, headache, shortness of breath, low blood pressure, wheezing, swelling of your lips, face, or throat, rash including hives, itching, muscle aches, and dizziness  The side effects of getting any medicine by vein may include brief pain, bleeding, bruising of the skin, soreness, swelling, and possible infection at the infusion site  These are not all the possible side effects of bamlanivimab  Not a lot of people have been given bamlanivimab  Serious and unexpected side effects may happen  Sanaz Elias is still being studied so it is possible that all of the risks are not known at this time  Please note that this drug was approved under the Emergency Use Authorization of the FDA and has not gone through the full, formal FDA approval process    It is possible that bamlanivimab could interfere with your body's own ability to fight off a future infection of SARS-CoV-2  Similarly, bamlanivimab may reduce your bodys immune response to a vaccine for SARS-CoV-2  Specific studies have not been conducted to address these possible risks       Currently there is no data or safety or efficacy of COVID-19 vaccination in persons who received monoclonal antibodies as part of COVID-19 treatment  Treatment should be deferred for at least 90 days to avoid interference of the treatment with vaccine-induced immune responses (this is based on estimated half-life of therapies and evidence suggesting reinfection is uncommon within 90 days of initial infection)  The patient consents to proceed with bamlanivimab infusion  *http://pi  jose antonio  com/eua/bamlanivimab-eua-factsheet-hcp  pdf    I have spent 15 minutes directly with the patient  Greater than 50% of this time was spent in counseling/coordination of care regarding: prognosis, risks and benefits of treatment options and patient and family education  Encounter provider Li Le MD    Provider located at [facility] [id number]    Recent Visits  Date Type Provider Dept   01/07/21 Telephone [address] [facility] Primary Care   01/06/21 Telephone 1100 [facility] Care   01/04/21 [facility] 153, [address] [address] Primary Care   Showing recent visits within past 7 days and meeting all other requirements     Today's Visits  Date Type Provider Dept   01/08/21 Telemedicine Li Le MD [facility] Primary Care   Showing today's visits and meeting all other requirements     Future Appointments  No visits were found meeting these conditions  Showing future appointments within next 150 days and meeting all other requirements        Patient agrees to participate in a virtual check in via telephone or video visit instead of presenting to the office to address urgent/immediate medical needs  Patient is aware this is a billable service  After connecting through Telephone, the patient was identified by name and date of birth   Marina Luna was informed that this was a telemedicine visit and that the exam was being conducted confidentially over secure lines  My office door was closed  No one else was in the room  Cleveland Singh acknowledged consent and understanding of privacy and security of the telemedicine visit  I informed the patient that I have reviewed his record in Epic and presented the opportunity for him to ask any questions regarding the visit today  The patient agreed to participate  Subjective:   Cleveland Singh is a 78 y o  male who has been screened for COVID-19  Symptom change since last report: unchanged  Patient's symptoms include fatigue, loss of taste, shortness of breath (with exertion), nausea (mild - poor appetite ) and headache  Patient denies fever, chills, congestion, rhinorrhea, sore throat, anosmia, cough, vomiting, diarrhea and myalgias  Ruben Aponte has been staying home and has isolated themselves in his home  He is taking care to not share personal items and is cleaning all surfaces that are touched often, like counters, tabletops, and doorknobs using household cleaning sprays or wipes  He is wearing a mask when he leaves his room  Date of symptom onset: 1/2/2021  Date of exposure: 12/30/2020  Date of positive COVID-19 PCR: 1/4/2021    Patient's symptoms are remaining stable  He has some exertional shortness of breath which is persistent but stable  He has a lot of fatigue and poor appetite      Lab Results   Component Value Date    SARSCOV2 Detected (A) 01/04/2021     Past Medical History:   Diagnosis Date    Anxiety     BPH (benign prostatic hypertrophy)     CLL (chronic lymphocytic leukemia) ([facility] [address] )     2009    Colon polyp     Concussion     Resolved: 08/22/16    Depression     Gastrointestinal hemorrhage     Last assessed: 08/27/13    GERD (gastroesophageal reflux disease)     Hearing loss of aging     Hiatal hernia     History of transfusion     Hyperlipidemia     Hypertension     Iron deficiency anemia     Microscopic hematuria     Last assessed: 06/28/13    OA (osteoarthritis)     right hip    Pneumothorax     Prostatitis     Pulmonary hypertension (HCC)     Seasonal allergies     UTI (urinary tract infection)     in past    Wears glasses      Past Surgical History:   Procedure Laterality Date    COLONOSCOPY      CYSTOSCOPY  10/09/2014    Diagnostic    CYSTOSCOPY  06/29/2020    FRACTURE SURGERY      left lower arm    FRACTURE SURGERY      left femur    HERNIA REPAIR      JOINT REPLACEMENT Right     hip    OTHER SURGICAL HISTORY  03/2011    Spinal anesthesia epidural    [facility] TOTAL HIP ARTHROPLASTY Right 9/29/2017    Procedure: ARTHROPLASTY HIP TOTAL ANTERIOR;  Surgeon: Jovanni Cardoso MD;  Location: [facility];  Service: Orthopedics    TONSILLECTOMY AND ADENOIDECTOMY      TRANSURETHRAL RESECTION OF PROSTATE      x 2    WRIST SURGERY       Current Outpatient Medications   Medication Sig Dispense Refill    amLODIPine (NORVASC) 5 mg tablet TAKE ONE TABLET BY MOUTH EVERY EVENING 90 tablet 3    aspirin 81 MG tablet Take 1 tablet by mouth daily      atorvastatin (LIPITOR) 20 mg tablet TAKE ONE TABLET BY MOUTH EVERY DAY 90 tablet 3    carvedilol (COREG) 12 5 mg tablet TAKE 1 TABLET BY MOUTH TWO TIMES A DAY WITH MEALS 180 tablet 3    dextromethorphan-guaiFENesin (ROBITUSSIN DM)  mg/5 mL syrup Take 5 mL by mouth 3 (three) times a day as needed for cough 120 mL 0    fluticasone (FLONASE) 50 mcg/act nasal spray 2 sprays into each nostril daily as needed for allergies 3 Bottle 3    losartan (COZAAR) 100 MG tablet TAKE ONE TABLET BY MOUTH EVERY DAY 90 tablet 3    Milk Thistle 1000 MG CAPS Take 250 mg by mouth       minoxidil (LONITEN) 10 mg tablet TAKE ONE TABLET BY MOUTH ONCE EVERY DAY 90 tablet 3    montelukast (SINGULAIR) 10 mg tablet Take 1 tablet (10 mg total) by mouth daily 90 tablet 3    naproxen (EC NAPROSYN) 500 MG EC tablet Take 1 tablet (500 mg total) by mouth 2 (two) times a day with meals (Patient taking differently: Take 500 mg by mouth as needed ) 60 tablet 2    Omega-3 Fatty Acids (FISH OIL) 1,000 mg Take by mouth daily      omeprazole (PriLOSEC) 40 MG capsule TAKE ONE CAPSULE BY MOUTH EVERY DAY 90 capsule 3    sertraline (ZOLOFT) 50 mg tablet TAKE ONE TABLET BY MOUTH EVERY DAY 90 tablet 3    tamsulosin (FLOMAX) 0 4 mg Take 1 capsule (0 4 mg total) by mouth daily with dinner CORRECTED SCRIPT! Note change in SIG and QUANTITY  Please process accordingly  Thank you! (9/1/20) 90 capsule 1    zolpidem (AMBIEN) 10 mg tablet TAKE ONE TABLET BY MOUTH AT BEDTIME AS NEEDED SLEEP 90 tablet 0     No current facility-administered medications for this visit  Allergies   Allergen Reactions    Codeine Other (See Comments)     agitated    Sulfamethoxazole-Trimethoprim GI Intolerance and Abdominal Pain     upset stomach       Review of Systems   Constitutional: Positive for fatigue  Negative for chills and fever  HENT: Negative for congestion, rhinorrhea and sore throat  Respiratory: Positive for shortness of breath (with exertion)  Negative for cough  Gastrointestinal: Positive for nausea (mild - poor appetite  )  Negative for diarrhea and vomiting  Musculoskeletal: Negative for myalgias  Neurological: Positive for headaches  Objective: There were no vitals filed for this visit  Physical Exam  VIRTUAL VISIT DISCLAIMER    Tora Gottron Екатерина acknowledges that he has consented to an online visit or consultation  He understands that the online visit is based solely on information provided by him, and that, in the absence of a face-to-face physical evaluation by the physician, the diagnosis he receives is both limited and provisional in terms of accuracy and completeness  This is not intended to replace a full medical face-to-face evaluation by the physician  Thom Russo understands and accepts these terms

## 2021-01-10 ENCOUNTER — HOSPITAL ENCOUNTER (OUTPATIENT)
Dept: INFUSION CENTER | Facility: HOSPITAL | Age: 71
Discharge: HOME | End: 2021-01-10

## 2021-01-10 ENCOUNTER — HOSPITAL ENCOUNTER (INPATIENT)
Facility: HOSPITAL | Age: 71
LOS: 11 days | Discharge: HOME HEALTH | DRG: 207 | End: 2021-01-21
Attending: INTERNAL MEDICINE | Admitting: INTERNAL MEDICINE
Payer: MEDICARE

## 2021-01-10 ENCOUNTER — HOSPITAL ENCOUNTER (EMERGENCY)
Facility: HOSPITAL | Age: 71
End: 2021-01-10
Attending: EMERGENCY MEDICINE | Admitting: EMERGENCY MEDICINE
Payer: MEDICARE

## 2021-01-10 ENCOUNTER — APPOINTMENT (EMERGENCY)
Dept: RADIOLOGY | Facility: HOSPITAL | Age: 71
End: 2021-01-10
Payer: MEDICARE

## 2021-01-10 VITALS
SYSTOLIC BLOOD PRESSURE: 91 MMHG | RESPIRATION RATE: 28 BRPM | HEART RATE: 72 BPM | OXYGEN SATURATION: 93 % | DIASTOLIC BLOOD PRESSURE: 60 MMHG

## 2021-01-10 VITALS
HEART RATE: 64 BPM | DIASTOLIC BLOOD PRESSURE: 51 MMHG | SYSTOLIC BLOOD PRESSURE: 104 MMHG | TEMPERATURE: 97.8 F | OXYGEN SATURATION: 92 % | RESPIRATION RATE: 24 BRPM

## 2021-01-10 DIAGNOSIS — E87.1 HYPONATREMIA: ICD-10-CM

## 2021-01-10 DIAGNOSIS — J96.01 ACUTE RESPIRATORY FAILURE WITH HYPOXIA (HCC): ICD-10-CM

## 2021-01-10 DIAGNOSIS — M16.11 PRIMARY OSTEOARTHRITIS OF RIGHT HIP: ICD-10-CM

## 2021-01-10 DIAGNOSIS — D72.829 LEUKOCYTOSIS: ICD-10-CM

## 2021-01-10 DIAGNOSIS — U07.1 PNEUMONIA DUE TO COVID-19 VIRUS: Primary | ICD-10-CM

## 2021-01-10 DIAGNOSIS — J12.82 PNEUMONIA DUE TO COVID-19 VIRUS: Primary | ICD-10-CM

## 2021-01-10 DIAGNOSIS — U07.1 COVID-19: Primary | ICD-10-CM

## 2021-01-10 DIAGNOSIS — R00.1 BRADYCARDIA: ICD-10-CM

## 2021-01-10 DIAGNOSIS — R09.02 HYPOXIA: ICD-10-CM

## 2021-01-10 DIAGNOSIS — C91.10 CLL (CHRONIC LYMPHOCYTIC LEUKEMIA) (HCC): ICD-10-CM

## 2021-01-10 DIAGNOSIS — E78.00 HYPERCHOLESTEROLEMIA: ICD-10-CM

## 2021-01-10 DIAGNOSIS — U07.1 COVID-19: ICD-10-CM

## 2021-01-10 DIAGNOSIS — J96.01 ACUTE RESPIRATORY FAILURE WITH HYPOXEMIA (HCC): ICD-10-CM

## 2021-01-10 LAB
ABO GROUP BLD: NORMAL
ALBUMIN SERPL BCP-MCNC: 2.7 G/DL (ref 3.5–5)
ALBUMIN SERPL BCP-MCNC: 3.7 G/DL (ref 3.4–4.8)
ALP SERPL-CCNC: 103.8 U/L (ref 10–129)
ALP SERPL-CCNC: 116 U/L (ref 46–116)
ALT SERPL W P-5'-P-CCNC: 45 U/L (ref 5–63)
ALT SERPL W P-5'-P-CCNC: 51 U/L (ref 12–78)
ANION GAP SERPL CALCULATED.3IONS-SCNC: 10 MMOL/L (ref 4–13)
ANION GAP SERPL CALCULATED.3IONS-SCNC: 9 MMOL/L (ref 4–13)
ANION GAP SERPL CALCULATED.3IONS-SCNC: 9 MMOL/L (ref 4–13)
ANISOCYTOSIS BLD QL SMEAR: PRESENT
APTT PPP: 35 SECONDS (ref 23–31)
AST SERPL W P-5'-P-CCNC: 50 U/L (ref 15–41)
AST SERPL W P-5'-P-CCNC: 53 U/L (ref 5–45)
BASE EXCESS BLDA CALC-SCNC: 1 MMOL/L (ref -2–3)
BASE EXCESS BLDA CALC-SCNC: 1 MMOL/L (ref -2–3)
BASOPHILS # BLD MANUAL: 0 THOUSAND/UL (ref 0–0.1)
BASOPHILS NFR MAR MANUAL: 0 % (ref 0–1)
BILIRUB SERPL-MCNC: 1.97 MG/DL (ref 0.2–1)
BILIRUB SERPL-MCNC: 2.77 MG/DL (ref 0.3–1.2)
BNP SERPL-MCNC: 395.4 PG/ML (ref 1–100)
BUN SERPL-MCNC: 26 MG/DL (ref 5–25)
BUN SERPL-MCNC: 26 MG/DL (ref 5–25)
BUN SERPL-MCNC: 28 MG/DL (ref 6–20)
CA-I BLD-SCNC: 1.09 MMOL/L (ref 1.12–1.32)
CA-I BLD-SCNC: 1.09 MMOL/L (ref 1.12–1.32)
CALCIUM ALBUM COR SERPL-MCNC: 9.4 MG/DL (ref 8.3–10.1)
CALCIUM SERPL-MCNC: 8.4 MG/DL (ref 8.3–10.1)
CALCIUM SERPL-MCNC: 8.4 MG/DL (ref 8.3–10.1)
CALCIUM SERPL-MCNC: 8.8 MG/DL (ref 8.4–10.2)
CHLORIDE SERPL-SCNC: 87 MMOL/L (ref 96–108)
CHLORIDE SERPL-SCNC: 88 MMOL/L (ref 100–108)
CHLORIDE SERPL-SCNC: 91 MMOL/L (ref 100–108)
CK SERPL-CCNC: 34 U/L (ref 49–397)
CO2 SERPL-SCNC: 23 MMOL/L (ref 21–32)
CO2 SERPL-SCNC: 24 MMOL/L (ref 21–32)
CO2 SERPL-SCNC: 26 MMOL/L (ref 22–33)
CREAT SERPL-MCNC: 1.06 MG/DL (ref 0.6–1.3)
CREAT SERPL-MCNC: 1.13 MG/DL (ref 0.6–1.3)
CREAT SERPL-MCNC: 1.22 MG/DL (ref 0.5–1.2)
CRP SERPL QL: 28.6 MG/L (ref 0–1)
D DIMER PPP FEU-MCNC: 0.73 UG/ML FEU
DOHLE BOD BLD QL SMEAR: PRESENT
EOSINOPHIL # BLD MANUAL: 0 THOUSAND/UL (ref 0–0.4)
EOSINOPHIL NFR BLD MANUAL: 0 % (ref 0–6)
ERYTHROCYTE [DISTWIDTH] IN BLOOD BY AUTOMATED COUNT: 13.7 % (ref 11.6–15.1)
FERRITIN SERPL-MCNC: 1696 NG/ML (ref 8–388)
FIO2 GAS DIL.REBREATH: 50 L
GFR SERPL CREATININE-BSD FRML MDRD: 61 ML/MIN/1.73SQ M
GFR SERPL CREATININE-BSD FRML MDRD: 66 ML/MIN/1.73SQ M
GFR SERPL CREATININE-BSD FRML MDRD: 72 ML/MIN/1.73SQ M
GLUCOSE SERPL-MCNC: 127 MG/DL (ref 65–140)
GLUCOSE SERPL-MCNC: 130 MG/DL (ref 65–140)
GLUCOSE SERPL-MCNC: 132 MG/DL (ref 65–140)
GLUCOSE SERPL-MCNC: 143 MG/DL (ref 65–140)
GLUCOSE SERPL-MCNC: 173 MG/DL (ref 65–140)
HCO3 BLDA-SCNC: 23.8 MMOL/L (ref 22–28)
HCO3 BLDA-SCNC: 24.5 MMOL/L (ref 22–28)
HCT VFR BLD AUTO: 26.5 % (ref 36.5–49.3)
HCT VFR BLD CALC: 22 % (ref 36.5–49.3)
HCT VFR BLD CALC: 24 % (ref 36.5–49.3)
HGB BLD-MCNC: 9.1 G/DL (ref 12–17)
HGB BLDA-MCNC: 7.5 G/DL (ref 12–17)
HGB BLDA-MCNC: 8.2 G/DL (ref 12–17)
HIV 1+2 AB+HIV1 P24 AG SERPL QL IA: NORMAL
HIV1 P24 AG SER QL: NORMAL
HYPERCHROMIA BLD QL SMEAR: PRESENT
INR PPP: 1.18 (ref 0.9–1.1)
LACTATE SERPL-SCNC: 0.7 MMOL/L (ref 0–2)
LYMPHOCYTES # BLD AUTO: 42.26 THOUSAND/UL (ref 0.6–4.47)
LYMPHOCYTES # BLD AUTO: 83 % (ref 14–44)
MAGNESIUM SERPL-MCNC: 1.9 MG/DL (ref 1.6–2.6)
MCH RBC QN AUTO: 33.3 PG (ref 26.8–34.3)
MCHC RBC AUTO-ENTMCNC: 34.3 G/DL (ref 31.4–37.4)
MCV RBC AUTO: 97 FL (ref 82–98)
MONOCYTES # BLD AUTO: 0.51 THOUSAND/UL (ref 0–1.22)
MONOCYTES NFR BLD: 1 % (ref 4–12)
NEUTROPHILS # BLD MANUAL: 8.15 THOUSAND/UL (ref 1.85–7.62)
NEUTS BAND NFR BLD MANUAL: 1 % (ref 0–8)
NEUTS SEG NFR BLD AUTO: 15 % (ref 43–75)
PCO2 BLD: 25 MMOL/L (ref 21–32)
PCO2 BLD: 25 MMOL/L (ref 21–32)
PCO2 BLD: 31.1 MM HG (ref 36–44)
PCO2 BLD: 32 MM HG (ref 36–44)
PH BLD: 7.49 [PH] (ref 7.35–7.45)
PH BLD: 7.49 [PH] (ref 7.35–7.45)
PLATELET # BLD AUTO: 220 THOUSANDS/UL (ref 149–390)
PLATELET # BLD AUTO: 241 THOUSANDS/UL (ref 149–390)
PLATELET BLD QL SMEAR: ADEQUATE
PMV BLD AUTO: 9.3 FL (ref 8.9–12.7)
PMV BLD AUTO: 9.6 FL (ref 8.9–12.7)
PO2 BLD: 27 MM HG (ref 75–129)
PO2 BLD: 66 MM HG (ref 75–129)
POTASSIUM BLD-SCNC: 3.9 MMOL/L (ref 3.5–5.3)
POTASSIUM BLD-SCNC: 4.3 MMOL/L (ref 3.5–5.3)
POTASSIUM SERPL-SCNC: 4.2 MMOL/L (ref 3.5–5.3)
POTASSIUM SERPL-SCNC: 4.4 MMOL/L (ref 3.5–5.3)
POTASSIUM SERPL-SCNC: 4.5 MMOL/L (ref 3.5–5)
PROCALCITONIN SERPL-MCNC: 0.21 NG/ML
PROCALCITONIN SERPL-MCNC: 0.26 NG/ML
PROT SERPL-MCNC: 7.4 G/DL (ref 6.4–8.2)
PROT SERPL-MCNC: 7.5 G/DL (ref 6.4–8.3)
PROTHROMBIN TIME: 13.2 SECONDS (ref 9.5–12.1)
RBC # BLD AUTO: 2.73 MILLION/UL (ref 3.88–5.62)
RBC MORPH BLD: PRESENT
RH BLD: POSITIVE
SAO2 % BLD FROM PO2: 57 % (ref 60–85)
SAO2 % BLD FROM PO2: 95 % (ref 60–85)
SODIUM BLD-SCNC: 121 MMOL/L (ref 136–145)
SODIUM BLD-SCNC: 121 MMOL/L (ref 136–145)
SODIUM SERPL-SCNC: 121 MMOL/L (ref 136–145)
SODIUM SERPL-SCNC: 122 MMOL/L (ref 133–145)
SODIUM SERPL-SCNC: 124 MMOL/L (ref 136–145)
SPECIMEN SOURCE: ABNORMAL
SPECIMEN SOURCE: ABNORMAL
TOTAL CELLS COUNTED SPEC: 100
TOXIC GRANULES BLD QL SMEAR: PRESENT
TROPONIN I SERPL-MCNC: <0.03 NG/ML (ref 0–0.07)
WBC # BLD AUTO: 50.92 THOUSAND/UL (ref 4.31–10.16)

## 2021-01-10 PROCEDURE — 96365 THER/PROPH/DIAG IV INF INIT: CPT

## 2021-01-10 PROCEDURE — 99223 1ST HOSP IP/OBS HIGH 75: CPT | Performed by: INTERNAL MEDICINE

## 2021-01-10 PROCEDURE — 85007 BL SMEAR W/DIFF WBC COUNT: CPT | Performed by: EMERGENCY MEDICINE

## 2021-01-10 PROCEDURE — 85049 AUTOMATED PLATELET COUNT: CPT | Performed by: NURSE PRACTITIONER

## 2021-01-10 PROCEDURE — 86140 C-REACTIVE PROTEIN: CPT | Performed by: EMERGENCY MEDICINE

## 2021-01-10 PROCEDURE — 83880 ASSAY OF NATRIURETIC PEPTIDE: CPT | Performed by: EMERGENCY MEDICINE

## 2021-01-10 PROCEDURE — 84300 ASSAY OF URINE SODIUM: CPT | Performed by: NURSE PRACTITIONER

## 2021-01-10 PROCEDURE — 82803 BLOOD GASES ANY COMBINATION: CPT

## 2021-01-10 PROCEDURE — 87040 BLOOD CULTURE FOR BACTERIA: CPT | Performed by: EMERGENCY MEDICINE

## 2021-01-10 PROCEDURE — 84484 ASSAY OF TROPONIN QUANT: CPT | Performed by: EMERGENCY MEDICINE

## 2021-01-10 PROCEDURE — 86704 HEP B CORE ANTIBODY TOTAL: CPT | Performed by: NURSE PRACTITIONER

## 2021-01-10 PROCEDURE — 86901 BLOOD TYPING SEROLOGIC RH(D): CPT | Performed by: NURSE PRACTITIONER

## 2021-01-10 PROCEDURE — 83735 ASSAY OF MAGNESIUM: CPT | Performed by: EMERGENCY MEDICINE

## 2021-01-10 PROCEDURE — 80053 COMPREHEN METABOLIC PANEL: CPT | Performed by: NURSE PRACTITIONER

## 2021-01-10 PROCEDURE — 80053 COMPREHEN METABOLIC PANEL: CPT | Performed by: EMERGENCY MEDICINE

## 2021-01-10 PROCEDURE — 82947 ASSAY GLUCOSE BLOOD QUANT: CPT

## 2021-01-10 PROCEDURE — 87806 HIV AG W/HIV1&2 ANTB W/OPTIC: CPT | Performed by: NURSE PRACTITIONER

## 2021-01-10 PROCEDURE — 84145 PROCALCITONIN (PCT): CPT | Performed by: NURSE PRACTITIONER

## 2021-01-10 PROCEDURE — 87040 BLOOD CULTURE FOR BACTERIA: CPT | Performed by: NURSE PRACTITIONER

## 2021-01-10 PROCEDURE — 80048 BASIC METABOLIC PNL TOTAL CA: CPT | Performed by: NURSE PRACTITIONER

## 2021-01-10 PROCEDURE — 86803 HEPATITIS C AB TEST: CPT | Performed by: NURSE PRACTITIONER

## 2021-01-10 PROCEDURE — 84295 ASSAY OF SERUM SODIUM: CPT

## 2021-01-10 PROCEDURE — 93005 ELECTROCARDIOGRAM TRACING: CPT

## 2021-01-10 PROCEDURE — 84132 ASSAY OF SERUM POTASSIUM: CPT

## 2021-01-10 PROCEDURE — 96366 THER/PROPH/DIAG IV INF ADDON: CPT

## 2021-01-10 PROCEDURE — 84145 PROCALCITONIN (PCT): CPT | Performed by: EMERGENCY MEDICINE

## 2021-01-10 PROCEDURE — 96375 TX/PRO/DX INJ NEW DRUG ADDON: CPT

## 2021-01-10 PROCEDURE — 85610 PROTHROMBIN TIME: CPT | Performed by: EMERGENCY MEDICINE

## 2021-01-10 PROCEDURE — 82550 ASSAY OF CK (CPK): CPT | Performed by: EMERGENCY MEDICINE

## 2021-01-10 PROCEDURE — 85027 COMPLETE CBC AUTOMATED: CPT | Performed by: EMERGENCY MEDICINE

## 2021-01-10 PROCEDURE — 85014 HEMATOCRIT: CPT

## 2021-01-10 PROCEDURE — 82330 ASSAY OF CALCIUM: CPT

## 2021-01-10 PROCEDURE — 83605 ASSAY OF LACTIC ACID: CPT | Performed by: EMERGENCY MEDICINE

## 2021-01-10 PROCEDURE — 94760 N-INVAS EAR/PLS OXIMETRY 1: CPT

## 2021-01-10 PROCEDURE — 86705 HEP B CORE ANTIBODY IGM: CPT | Performed by: NURSE PRACTITIONER

## 2021-01-10 PROCEDURE — 85379 FIBRIN DEGRADATION QUANT: CPT | Performed by: NURSE PRACTITIONER

## 2021-01-10 PROCEDURE — 99292 CRITICAL CARE ADDL 30 MIN: CPT | Performed by: EMERGENCY MEDICINE

## 2021-01-10 PROCEDURE — 71045 X-RAY EXAM CHEST 1 VIEW: CPT

## 2021-01-10 PROCEDURE — 36600 WITHDRAWAL OF ARTERIAL BLOOD: CPT

## 2021-01-10 PROCEDURE — 36415 COLL VENOUS BLD VENIPUNCTURE: CPT | Performed by: EMERGENCY MEDICINE

## 2021-01-10 PROCEDURE — 86900 BLOOD TYPING SEROLOGIC ABO: CPT | Performed by: NURSE PRACTITIONER

## 2021-01-10 PROCEDURE — 85730 THROMBOPLASTIN TIME PARTIAL: CPT | Performed by: EMERGENCY MEDICINE

## 2021-01-10 PROCEDURE — 82728 ASSAY OF FERRITIN: CPT | Performed by: EMERGENCY MEDICINE

## 2021-01-10 PROCEDURE — 99285 EMERGENCY DEPT VISIT HI MDM: CPT

## 2021-01-10 PROCEDURE — 99291 CRITICAL CARE FIRST HOUR: CPT | Performed by: EMERGENCY MEDICINE

## 2021-01-10 PROCEDURE — 87340 HEPATITIS B SURFACE AG IA: CPT | Performed by: NURSE PRACTITIONER

## 2021-01-10 RX ORDER — FAMOTIDINE 20 MG/1
20 TABLET, FILM COATED ORAL 2 TIMES DAILY
Status: DISCONTINUED | OUTPATIENT
Start: 2021-01-10 | End: 2021-01-10 | Stop reason: DRUGHIGH

## 2021-01-10 RX ORDER — CEFTRIAXONE 2 G/50ML
2000 INJECTION, SOLUTION INTRAVENOUS ONCE
Status: COMPLETED | OUTPATIENT
Start: 2021-01-10 | End: 2021-01-10

## 2021-01-10 RX ORDER — DEXAMETHASONE SODIUM PHOSPHATE 4 MG/ML
8 INJECTION, SOLUTION INTRA-ARTICULAR; INTRALESIONAL; INTRAMUSCULAR; INTRAVENOUS; SOFT TISSUE ONCE
Status: COMPLETED | OUTPATIENT
Start: 2021-01-10 | End: 2021-01-10

## 2021-01-10 RX ORDER — AMOXICILLIN 250 MG
1 CAPSULE ORAL
Status: DISCONTINUED | OUTPATIENT
Start: 2021-01-10 | End: 2021-01-21 | Stop reason: HOSPADM

## 2021-01-10 RX ORDER — FLUTICASONE PROPIONATE 50 MCG
2 SPRAY, SUSPENSION (ML) NASAL DAILY PRN
Status: DISCONTINUED | OUTPATIENT
Start: 2021-01-10 | End: 2021-01-21 | Stop reason: HOSPADM

## 2021-01-10 RX ORDER — CHLORAL HYDRATE 500 MG
1000 CAPSULE ORAL DAILY
Status: DISCONTINUED | OUTPATIENT
Start: 2021-01-10 | End: 2021-01-13

## 2021-01-10 RX ORDER — DOXYCYCLINE HYCLATE 100 MG/1
100 CAPSULE ORAL EVERY 12 HOURS
Status: DISCONTINUED | OUTPATIENT
Start: 2021-01-10 | End: 2021-01-10

## 2021-01-10 RX ORDER — GUAIFENESIN/DEXTROMETHORPHAN 100-10MG/5
10 SYRUP ORAL EVERY 4 HOURS PRN
Status: DISCONTINUED | OUTPATIENT
Start: 2021-01-10 | End: 2021-01-21 | Stop reason: HOSPADM

## 2021-01-10 RX ORDER — DOXYCYCLINE HYCLATE 100 MG/1
100 CAPSULE ORAL EVERY 12 HOURS SCHEDULED
Status: DISCONTINUED | OUTPATIENT
Start: 2021-01-10 | End: 2021-01-11

## 2021-01-10 RX ORDER — TAMSULOSIN HYDROCHLORIDE 0.4 MG/1
0.4 CAPSULE ORAL
Status: DISCONTINUED | OUTPATIENT
Start: 2021-01-10 | End: 2021-01-13

## 2021-01-10 RX ORDER — MULTIVITAMIN/IRON/FOLIC ACID 18MG-0.4MG
1 TABLET ORAL DAILY
Status: DISCONTINUED | OUTPATIENT
Start: 2021-01-17 | End: 2021-01-21 | Stop reason: HOSPADM

## 2021-01-10 RX ORDER — DOXYCYCLINE HYCLATE 100 MG/1
100 CAPSULE ORAL ONCE
Status: COMPLETED | OUTPATIENT
Start: 2021-01-10 | End: 2021-01-10

## 2021-01-10 RX ORDER — POLYETHYLENE GLYCOL 3350 17 G/17G
17 POWDER, FOR SOLUTION ORAL DAILY
Status: DISCONTINUED | OUTPATIENT
Start: 2021-01-11 | End: 2021-01-21 | Stop reason: HOSPADM

## 2021-01-10 RX ORDER — ASPIRIN 81 MG/1
81 TABLET ORAL DAILY
Status: DISCONTINUED | OUTPATIENT
Start: 2021-01-10 | End: 2021-01-21 | Stop reason: HOSPADM

## 2021-01-10 RX ORDER — HEPARIN SODIUM 5000 [USP'U]/ML
5000 INJECTION, SOLUTION INTRAVENOUS; SUBCUTANEOUS EVERY 8 HOURS SCHEDULED
Status: DISCONTINUED | OUTPATIENT
Start: 2021-01-10 | End: 2021-01-10

## 2021-01-10 RX ORDER — MELATONIN
2000 DAILY
Status: DISCONTINUED | OUTPATIENT
Start: 2021-01-10 | End: 2021-01-13

## 2021-01-10 RX ORDER — DEXAMETHASONE SODIUM PHOSPHATE 10 MG/ML
0.1 INJECTION, SOLUTION INTRAMUSCULAR; INTRAVENOUS EVERY 12 HOURS
Status: COMPLETED | OUTPATIENT
Start: 2021-01-10 | End: 2021-01-19

## 2021-01-10 RX ORDER — SODIUM CHLORIDE 1000 MG
1 TABLET, SOLUBLE MISCELLANEOUS
Status: DISCONTINUED | OUTPATIENT
Start: 2021-01-11 | End: 2021-01-11

## 2021-01-10 RX ORDER — ALBUTEROL SULFATE 90 UG/1
2 AEROSOL, METERED RESPIRATORY (INHALATION) EVERY 4 HOURS PRN
Status: DISCONTINUED | OUTPATIENT
Start: 2021-01-10 | End: 2021-01-21 | Stop reason: HOSPADM

## 2021-01-10 RX ORDER — CHLORHEXIDINE GLUCONATE 0.12 MG/ML
15 RINSE ORAL EVERY 12 HOURS SCHEDULED
Status: DISCONTINUED | OUTPATIENT
Start: 2021-01-10 | End: 2021-01-21 | Stop reason: HOSPADM

## 2021-01-10 RX ORDER — LANOLIN ALCOHOL/MO/W.PET/CERES
6 CREAM (GRAM) TOPICAL
Status: DISCONTINUED | OUTPATIENT
Start: 2021-01-10 | End: 2021-01-21 | Stop reason: HOSPADM

## 2021-01-10 RX ORDER — ZINC SULFATE 50(220)MG
220 CAPSULE ORAL DAILY
Status: COMPLETED | OUTPATIENT
Start: 2021-01-10 | End: 2021-01-16

## 2021-01-10 RX ORDER — FAMOTIDINE 20 MG/1
20 TABLET, FILM COATED ORAL 2 TIMES DAILY
Status: DISCONTINUED | OUTPATIENT
Start: 2021-01-10 | End: 2021-01-13

## 2021-01-10 RX ORDER — ZOLPIDEM TARTRATE 5 MG/1
10 TABLET ORAL
Status: DISCONTINUED | OUTPATIENT
Start: 2021-01-10 | End: 2021-01-13

## 2021-01-10 RX ORDER — ATORVASTATIN CALCIUM 40 MG/1
40 TABLET, FILM COATED ORAL
Status: DISCONTINUED | OUTPATIENT
Start: 2021-01-10 | End: 2021-01-13

## 2021-01-10 RX ORDER — ASCORBIC ACID 500 MG
1000 TABLET ORAL EVERY 12 HOURS SCHEDULED
Status: DISCONTINUED | OUTPATIENT
Start: 2021-01-10 | End: 2021-01-13

## 2021-01-10 RX ORDER — SODIUM CHLORIDE 9 MG/ML
50 INJECTION, SOLUTION INTRAVENOUS CONTINUOUS
Status: DISCONTINUED | OUTPATIENT
Start: 2021-01-10 | End: 2021-01-11

## 2021-01-10 RX ORDER — MONTELUKAST SODIUM 10 MG/1
10 TABLET ORAL DAILY
Status: DISCONTINUED | OUTPATIENT
Start: 2021-01-10 | End: 2021-01-21 | Stop reason: HOSPADM

## 2021-01-10 RX ADMIN — OXYCODONE HYDROCHLORIDE AND ACETAMINOPHEN 1000 MG: 500 TABLET ORAL at 22:19

## 2021-01-10 RX ADMIN — REMDESIVIR 200 MG: 100 INJECTION, POWDER, LYOPHILIZED, FOR SOLUTION INTRAVENOUS at 17:23

## 2021-01-10 RX ADMIN — DOXYCYCLINE 100 MG: 100 CAPSULE ORAL at 10:07

## 2021-01-10 RX ADMIN — DOXYCYCLINE 100 MG: 100 CAPSULE ORAL at 22:19

## 2021-01-10 RX ADMIN — TAMSULOSIN HYDROCHLORIDE 0.4 MG: 0.4 CAPSULE ORAL at 17:37

## 2021-01-10 RX ADMIN — OXYCODONE HYDROCHLORIDE AND ACETAMINOPHEN 1000 MG: 500 TABLET ORAL at 13:58

## 2021-01-10 RX ADMIN — CEFTRIAXONE 2000 MG: 2 INJECTION, SOLUTION INTRAVENOUS at 09:08

## 2021-01-10 RX ADMIN — DEXAMETHASONE SODIUM PHOSPHATE 8.5 MG: 10 INJECTION, SOLUTION INTRAMUSCULAR; INTRAVENOUS at 22:25

## 2021-01-10 RX ADMIN — ENOXAPARIN SODIUM 30 MG: 30 INJECTION SUBCUTANEOUS at 22:22

## 2021-01-10 RX ADMIN — MONTELUKAST SODIUM 10 MG: 10 TABLET, FILM COATED ORAL at 13:58

## 2021-01-10 RX ADMIN — HEPARIN SODIUM 5000 UNITS: 5000 INJECTION INTRAVENOUS; SUBCUTANEOUS at 14:12

## 2021-01-10 RX ADMIN — SODIUM CHLORIDE, SODIUM LACTATE, POTASSIUM CHLORIDE, AND CALCIUM CHLORIDE 1000 ML: .6; .31; .03; .02 INJECTION, SOLUTION INTRAVENOUS at 09:05

## 2021-01-10 RX ADMIN — FAMOTIDINE 20 MG: 20 TABLET ORAL at 14:13

## 2021-01-10 RX ADMIN — SODIUM CHLORIDE, SODIUM LACTATE, POTASSIUM CHLORIDE, AND CALCIUM CHLORIDE 1000 ML: .6; .31; .03; .02 INJECTION, SOLUTION INTRAVENOUS at 10:02

## 2021-01-10 RX ADMIN — DEXAMETHASONE SODIUM PHOSPHATE 8 MG: 4 INJECTION INTRA-ARTICULAR; INTRALESIONAL; INTRAMUSCULAR; INTRAVENOUS; SOFT TISSUE at 09:12

## 2021-01-10 RX ADMIN — CHLORHEXIDINE GLUCONATE 0.12% ORAL RINSE 15 ML: 1.2 LIQUID ORAL at 13:59

## 2021-01-10 RX ADMIN — ATORVASTATIN CALCIUM 40 MG: 40 TABLET, FILM COATED ORAL at 22:21

## 2021-01-10 RX ADMIN — FAMOTIDINE 20 MG: 20 TABLET ORAL at 22:21

## 2021-01-10 RX ADMIN — GUAIFENESIN AND DEXTROMETHORPHAN 10 ML: 100; 10 SYRUP ORAL at 22:19

## 2021-01-10 RX ADMIN — ZINC SULFATE 220 MG (50 MG) CAPSULE 220 MG: CAPSULE at 13:59

## 2021-01-10 RX ADMIN — Medication 2000 UNITS: at 13:58

## 2021-01-10 NOTE — PROGRESS NOTES
Pt presented for bamlanivimab infusion  Pt reports feeling fatigued and SOB with exertion  Pts SpO2 on room air 62-70%  Pt titrated to 6L nasal canula, SpO2 85%  Pt placed on nonrebreather mask, SpO2 93%  Medical emergency called  Report given and pt transported to ED with ED staff

## 2021-01-10 NOTE — ED NOTES
[facility] icu bed 8  Accepting: Dr Charmaine Garcia  Report:   [id number], [facility]  01/10/21 1025

## 2021-01-10 NOTE — ED NOTES
Attempting to reach the ICU at [facility] for report, no answer at number provided, will continue to try to call     Carolyn Schaefer RN  01/10/21 3911

## 2021-01-10 NOTE — ED PROVIDER NOTES
History  Chief Complaint   Patient presents with    Shortness of Breath     patient sent from [facility] infusion center for eval of hypoxia and hypotension  patient c/o shortness of breath, weakness, fatigue  This is a 72-year-old male presenting to the emergency department for evaluation of hypoxia in the setting of a recent positive COVID-19 test   He states he has been sick for about 1 week since last Saturday and he tested positive for the coronavirus on January 4th, 6 days ago  He admits to frequent cough, generalized weakness, myalgias and gradually progressive shortness of breath over the past few days, worst when he is coughing  He was upstairs in the infusion center he was just about to receive the monoclonal antibody infusion prescribed by his primary doctor when the RN noticed that his blood pressure was low, 90s over 60s and his pulse ox was in the 60s on room air  They placed him on a non-rebreather and his pulse ox came up to 100%  He brought him down to the emergency department after a medical alert was called for further treatment  History provided by:  Patient   used: No    Shortness of Breath  Severity:  Moderate  Onset quality:  Gradual  Duration:  1 week  Timing:  Constant  Progression:  Worsening  Chronicity:  New  Relieved by:  Oxygen  Worsened by:  Nothing  Ineffective treatments:  None tried  Associated symptoms: cough        Prior to Admission Medications   Prescriptions Last Dose Informant Patient Reported? Taking?    Milk Thistle 1000 MG CAPS  Self Yes No   Sig: Take 250 mg by mouth    Omega-3 Fatty Acids (FISH OIL) 1,000 mg  Self Yes No   Sig: Take by mouth daily   amLODIPine (NORVASC) 5 mg tablet  Self No No   Sig: TAKE ONE TABLET BY MOUTH EVERY EVENING   aspirin 81 MG tablet  Self Yes No   Sig: Take 1 tablet by mouth daily   atorvastatin (LIPITOR) 20 mg tablet  Self No No   Sig: TAKE ONE TABLET BY MOUTH EVERY DAY   carvedilol (COREG) 12 5 mg tablet  Self No No   Sig: TAKE 1 TABLET BY MOUTH TWO TIMES A DAY WITH MEALS   dextromethorphan-guaiFENesin (ROBITUSSIN DM)  mg/5 mL syrup   No No   Sig: Take 5 mL by mouth 3 (three) times a day as needed for cough   fluticasone (FLONASE) 50 mcg/act nasal spray  Self No No   Si sprays into each nostril daily as needed for allergies   losartan (COZAAR) 100 MG tablet  Self No No   Sig: TAKE ONE TABLET BY MOUTH EVERY DAY   minoxidil (LONITEN) 10 mg tablet   No No   Sig: TAKE ONE TABLET BY MOUTH ONCE EVERY DAY   montelukast (SINGULAIR) 10 mg tablet  Self No No   Sig: Take 1 tablet (10 mg total) by mouth daily   naproxen (EC NAPROSYN) 500 MG EC tablet  Self No No   Sig: Take 1 tablet (500 mg total) by mouth 2 (two) times a day with meals   Patient taking differently: Take 500 mg by mouth as needed    omeprazole (PriLOSEC) 40 MG capsule  Self No No   Sig: TAKE ONE CAPSULE BY MOUTH EVERY DAY   sertraline (ZOLOFT) 50 mg tablet  Self No No   Sig: TAKE ONE TABLET BY MOUTH EVERY DAY   tamsulosin (FLOMAX) 0 4 mg  Self No No   Sig: Take 1 capsule (0 4 mg total) by mouth daily with dinner CORRECTED SCRIPT! Note change in SIG and QUANTITY  Please process accordingly    Thank you! (20)   zolpidem (AMBIEN) 10 mg tablet  Self No No   Sig: TAKE ONE TABLET BY MOUTH AT BEDTIME AS NEEDED SLEEP      Facility-Administered Medications: None       Past Medical History:   Diagnosis Date    Anxiety     BPH (benign prostatic hypertrophy)     CLL [facility]ch[facility]ocytic leukemia) ([facility] [address] [facility]         Colon polyp     Concussion     Resolved: 16    Depression     Gastrointestinal hemorrhage     Last assessed: 13    GERD (gastroesophageal reflux disease)     Hearing loss of aging     Hiatal hernia     History of transfusion     Hyperlipidemia     Hypertension     Iron deficiency anemia     Microscopic hematuria     Last assessed: 13    OA (osteoarthritis)     right hip    Pneumothorax     Prostatitis     Pulmonary hypertension ([address] )     Seasonal allergies     UTI (urinary tract infection)     in past    Wears glasses        Past Surgical History:   Procedure Laterality Date    COLONOSCOPY      CYSTOSCOPY  10/09/2014    Diagnostic    CYSTOSCOPY  2020    FRACTURE SURGERY      left lower arm    FRACTURE SURGERY      left femur    HERNIA REPAIR      JOINT REPLACEMENT Right     hip    OTHER SURGICAL HISTORY  2011    Spinal anesthesia epidural    IA TOTAL HIP ARTHROPLASTY Right 2017    Procedure: ARTHROPLASTY HIP TOTAL ANTERIOR;  Surgeon: Shana Ramon MD;  Location: [facility];  Service: Orthopedics    TONSILLECTOMY AND ADENOIDECTOMY      TRANSURETHRAL RESECTION OF PROSTATE      x 2    WRIST SURGERY         Family History   Problem Relation Age of Onset    No Known Problems Mother     Heart attack Father     Diabetes Brother     Prostate cancer Brother      I have reviewed and agree with the history as documented  E-Cigarette/Vaping    E-Cigarette Use Former User      E-Cigarette/Vaping Substances    Nicotine No     THC No     CBD No     Flavoring No     Other No     Unknown No      Social History     Tobacco Use    Smoking status: Former Smoker     Packs/day: 1 00     Years: 15 00     Pack years: 15 00     Types: Cigarettes     Start date:      Quit date: 1980     Years since quittin 3    Smokeless tobacco: Never Used   Substance Use Topics    Alcohol use: Yes     Alcohol/week: 10 0 standard drinks     Types: 10 Cans of beer per week     Frequency: 4 or more times a week     Drinks per session: 3 or 4    Drug use: Not Currently     Types: Marijuana       Review of Systems   Constitutional: Positive for fatigue  Respiratory: Positive for cough and shortness of breath  All other systems reviewed and are negative  Physical Exam  Physical Exam  Vitals signs and nursing note reviewed  Constitutional:       General: He is not in acute distress       Appearance: He is well-developed and normal weight  He is ill-appearing  He is not toxic-appearing  HENT:      Head: Normocephalic and atraumatic  Mouth/Throat:      Mouth: Mucous membranes are moist       Pharynx: Oropharynx is clear  Eyes:      Extraocular Movements: Extraocular movements intact  Pupils: Pupils are equal, round, and reactive to light  Neck:      Musculoskeletal: Normal range of motion and neck supple  Cardiovascular:      Rate and Rhythm: Normal rate and regular rhythm  Pulmonary:      Effort: Pulmonary effort is normal  Tachypnea present  Breath sounds: Normal breath sounds  Musculoskeletal:      Right lower leg: No edema  Left lower leg: No edema  Skin:     General: Skin is warm and dry  Capillary Refill: Capillary refill takes less than 2 seconds  Neurological:      General: No focal deficit present  Mental Status: He is alert and oriented to person, place, and time     Psychiatric:         Mood and Affect: Mood normal          Behavior: Behavior normal          Vital Signs  ED Triage Vitals   Temperature Pulse Respirations Blood Pressure SpO2   01/10/21 0832 01/10/21 0832 01/10/21 0832 01/10/21 0832 01/10/21 0832   97 8 °F (36 6 °C) 72 (!) 28 105/52 98 %      Temp src Heart Rate Source Patient Position - Orthostatic VS BP Location FiO2 (%)   -- 01/10/21 1002 01/10/21 1002 01/10/21 1002 --    Monitor Lying Left arm       Pain Score       --                  Vitals:    01/10/21 0832 01/10/21 1002 01/10/21 1116   BP: 105/52 103/52 104/51   Pulse: 72 67 64   Patient Position - Orthostatic VS:  Lying Lying         Visual Acuity      ED Medications  Medications   lactated ringers bolus 1,000 mL (0 mL Intravenous Stopped 1/10/21 1000)     Followed by   lactated ringers bolus 1,000 mL (0 mL Intravenous Stopped 1/10/21 1145)   cefTRIAXone (ROCEPHIN) IVPB (premix in dextrose) 2,000 mg 50 mL (0 mg Intravenous Stopped 1/10/21 0956)   doxycycline hyclate (VIBRAMYCIN) capsule 100 mg (100 mg Oral Given 1/10/21 1007)   dexamethasone (DECADRON) injection 8 mg (8 mg Intravenous Given 1/10/21 0912)       Diagnostic Studies  Results Reviewed     Procedure Component Value Units Date/Time    Blood culture #1 [[id number]] Collected: 01/10/21 0852    Lab Status: Preliminary result Specimen: Blood from Arm, Left Updated: 01/10/21 1801     Blood Culture Received in Microbiology Lab  Culture in Progress  Blood culture #2 [[id number]] Collected: 01/10/21 1012    Lab Status: Preliminary result Specimen: Blood from Arm, Right Updated: 01/10/21 1801     Blood Culture Received in Microbiology Lab  Culture in Progress      Ferritin [[id number]]  (Abnormal) Collected: 01/10/21 0852    Lab Status: Final result Specimen: Blood from Arm, Left Updated: 01/10/21 1508     Ferritin 1,696 ng/mL     B-Type Natriuretic Peptide [facility] ONLY) [[id number]]  (Abnormal) Collected: 01/10/21 0852    Lab Status: Final result Specimen: Blood from Arm, Left Updated: 01/10/21 0940      4 pg/mL     CBC and differential [[id number]]  (Abnormal) Collected: 01/10/21 0853    Lab Status: Final result Specimen: Blood from Arm, Left Updated: 01/10/21 0937     WBC 50 92 Thousand/uL      RBC 2 73 Million/uL      Hemoglobin 9 1 g/dL      Hematocrit 26 5 %      MCV 97 fL      MCH 33 3 pg      MCHC 34 3 g/dL      RDW 13 7 %      MPV 9 3 fL      Platelets 416 Thousands/uL     Manual Differential(PHLEBS Do Not Order) [[id number]]  (Abnormal) Collected: 01/10/21 0853    Lab Status: Final result Specimen: Blood from Arm, Left Updated: 01/10/21 0937     Segmented % 15 %      Bands % 1 %      Lymphocytes % 83 %      Monocytes % 1 %      Eosinophils, % 0 %      Basophils % 0 %      Absolute Neutrophils 8 15 Thousand/uL      Lymphocytes Absolute 42 26 Thousand/uL      Monocytes Absolute 0 51 Thousand/uL      Eosinophils Absolute 0 00 Thousand/uL      Basophils Absolute 0 00 Thousand/uL      Total Counted 100     Dohle Bodies Present Toxic Granulation Present     RBC Morphology Present     Anisocytosis Present     Hypochromia Present     Platelet Estimate Adequate    Troponin I [[id number]]  (Normal) Collected: 01/10/21 0852    Lab Status: Final result Specimen: Blood from Arm, Left Updated: 01/10/21 0937     Troponin I <0 03 ng/mL     POCT Blood Gas (CG8+) [[id number]]  (Abnormal) Collected: 01/10/21 0931    Lab Status: Final result Specimen: Arterial Updated: 01/10/21 0935     pH, Art i-STAT 7 491     pCO2, Art i-STAT 31 1 mm HG      pO2, ART i-STAT 66 0 mm HG      BE, i-STAT 1 mmol/L      HCO3, Art i-STAT 23 8 mmol/L      CO2, i-STAT 25 mmol/L      O2 Sat, i-STAT 95 %      SODIUM, I-STAT 121 mmol/l      Potassium, i-STAT 3 9 mmol/L      Calcium, Ionized i-STAT 1 09 mmol/L      Hct, i-STAT 24 %      Hgb, i-STAT 8 2 g/dl      Glucose, i-STAT 130 mg/dl      POC FIO2 50 L      Specimen Type ARTERIAL    POCT Blood Gas (CG8+) [[id number]]  (Abnormal) Collected: 01/10/21 0920    Lab Status: Final result Specimen: Arterial Updated: 01/10/21 0935     pH, Art i-STAT 7 491     pCO2, Art i-STAT 32 0 mm HG      pO2, ART i-STAT 27 0 mm HG      BE, i-STAT 1 mmol/L      HCO3, Art i-STAT 24 5 mmol/L      CO2, i-STAT 25 mmol/L      O2 Sat, i-STAT 57 %      SODIUM, I-STAT 121 mmol/l      Potassium, i-STAT 4 3 mmol/L      Calcium, Ionized i-STAT 1 09 mmol/L      Hct, i-STAT 22 %      Hgb, i-STAT 7 5 g/dl      Glucose, i-STAT 132 mg/dl      Specimen Type ARTERIAL    Lactic acid, plasma [[id number]]  (Normal) Collected: 01/10/21 0852    Lab Status: Final result Specimen: Blood from Arm, Left Updated: 01/10/21 0266     LACTIC ACID 0 7 mmol/L     Narrative:      Result may be elevated if tourniquet was used during collection      Comprehensive metabolic panel [[id number]]  (Abnormal) Collected: 01/10/21 0852    Lab Status: Final result Specimen: Blood from Arm, Left Updated: 01/10/21 1506     Sodium 122 mmol/L      Potassium 4 5 mmol/L      Chloride 87 mmol/L      CO2 26 mmol/L      ANION GAP 9 mmol/L      BUN 28 mg/dL      Creatinine 1 22 mg/dL      Glucose 127 mg/dL      Calcium 8 8 mg/dL      AST 50 U/L      ALT 45 U/L      Alkaline Phosphatase 103 8 U/L      Total Protein 7 5 g/dL      Albumin 3 7 g/dL      Total Bilirubin 2 77 mg/dL      eGFR 61 ml/min/1 73sq m     Narrative:      [place] guidelines for Chronic Kidney Disease (CKD):     Stage 1 with normal or high GFR (GFR > 90 mL/min/1 73 square meters)    Stage 2 Mild CKD (GFR = 60-89 mL/min/1 73 square meters)    Stage 3A Moderate CKD (GFR = 45-59 mL/min/1 73 square meters)    Stage 3B Moderate CKD (GFR = 30-44 mL/min/1 73 square meters)    Stage 4 Severe CKD (GFR = 15-29 mL/min/1 73 square meters)    Stage 5 End Stage CKD (GFR <15 mL/min/1 73 square meters)  Note: GFR calculation is accurate only with a steady state creatinine    Magnesium [[id number]]  (Normal) Collected: 01/10/21 0852    Lab Status: Final result Specimen: Blood from Arm, Left Updated: 01/10/21 0922     Magnesium 1 9 mg/dL     C-reactive protein [[id number]]  (Abnormal) Collected: 01/10/21 0852    Lab Status: Final result Specimen: Blood from Arm, Left Updated: 01/10/21 0922     CRP 28 6 mg/L     CK (with reflex to MB) [[id number]]  (Abnormal) Collected: 01/10/21 0852    Lab Status: Final result Specimen: Blood from Arm, Left Updated: 01/10/21 0922     Total CK 34 0 U/L     Protime-INR [[id number]]  (Abnormal) Collected: 01/10/21 0852    Lab Status: Final result Specimen: Blood from Arm, Left Updated: 01/10/21 0920     Protime 13 2 seconds      INR 1 18    Narrative:      INR Reference Ranges:  No Anticoagulant, Normal:           0 9-1 1  Standard Dose, Oral Anticoagulant:  2 0-3 0  High Dose, Oral Anticoagulant:      2 5-3 5    APTT [[id number]]  (Abnormal) Collected: 01/10/21 0852    Lab Status: Final result Specimen: Blood from Arm, Left Updated: 01/10/21 0920     PTT 35 seconds     Procalcitonin with AM Reflex [[id number]] Collected: 01/10/21 0852    Lab Status:  In process Specimen: Blood from Arm, Left Updated: 01/10/21 0859                 XR chest portable   ED Interpretation by Kimberley Garcia DO (01/10 6980)   Severe bilateral patchy infiltrates, R worse than L                 Procedures  ECG 12 Lead Documentation Only    Date/Time: 1/10/2021 8:51 AM  Performed by: Kimberley Garcia DO  Authorized by: Kimberley Garcia DO     Indications / Diagnosis:  Hypoxia  ECG reviewed by me, the ED Provider: yes    Patient location:  ED  Previous ECG:     Previous ECG:  Compared to current    Similarity:  Changes noted    Comparison to cardiac monitor: Yes    Interpretation:     Interpretation: abnormal    Rate:     ECG rate:  69    ECG rate assessment: normal    Rhythm:     Rhythm: sinus rhythm    Ectopy:     Ectopy: none    QRS:     QRS axis:  Normal    QRS intervals:  Normal  Conduction:     Conduction: normal    ST segments:     ST segments:  Normal  T waves:     T waves: inverted      Inverted:  V4, V5, V6 and aVL    CriticalCare Time  Performed by: Kimberley Garcia DO  Authorized by: Kimberley Garcia DO     Critical care provider statement:     Critical care time (minutes):  80    Critical care was necessary to treat or prevent imminent or life-threatening deterioration of the following conditions:  Respiratory failure, sepsis and dehydration    Critical care was time spent personally by me on the following activities:  Blood draw for specimens, obtaining history from patient or surrogate, development of treatment plan with patient or surrogate, discussions with consultants, discussions with primary provider, evaluation of patient's response to treatment, examination of patient, review of old charts, re-evaluation of patient's condition, ordering and review of radiographic studies, ordering and review of laboratory studies and ordering and performing treatments and interventions    I assumed direction of critical care for this patient from another provider in my specialty: no    Comments:      Severe covid pneumonia, hypoxemic resp failure  ED Course  ED Course as of Som 10 1824   Aston Garcia Som 10, 2021   0847 Pt placed on 50% FIO2 high flow nasal cannula (optiflow)  O2 sats  2761 D/w critical care Dr Lokesh Pineda at [facility], accepts to [facility] level 1       1142 Bp improving                                 SBIRT 20yo+      Most Recent Value   SBIRT (23 yo +)   In order to provide better care to our patients, we are screening all of our patients for alcohol and drug use  Would it be okay to ask you these screening questions? Yes Filed at: 01/10/2021 1008   Initial Alcohol Screen: US AUDIT-C    1  How often do you have a drink containing alcohol?  0 Filed at: 01/10/2021 1008   2  How many drinks containing alcohol do you have on a typical day you are drinking? 0 Filed at: 01/10/2021 1008   3a  Male UNDER 65: How often do you have five or more drinks on one occasion? 0 Filed at: 01/10/2021 1008   3b  FEMALE Any Age, or MALE 65+: How often do you have 4 or more drinks on one occassion? 0 Filed at: 01/10/2021 1008   Audit-C Score  0 Filed at: 01/10/2021 1008   ROSALIA: How many times in the past year have you    Used an illegal drug or used a prescription medication for non-medical reasons? Never Filed at: 01/10/2021 1008                    MDM  Number of Diagnoses or Management Options  Acute respiratory failure with hypoxemia ([facility] [address] ): new and requires workup  Hyponatremia:   Hypoxia:   Leukocytosis:   Pneumonia due to COVID-19 virus: new and requires workup  Diagnosis management comments: 79 yo M w/ CLL and severe covid19 pneumonia, very hypoxic requiring hi flow nasal cannula and 50% FIO2  Admit to level 1 stepdown at [facility]         Amount and/or Complexity of Data Reviewed  Clinical lab tests: ordered and reviewed  Tests in the radiology section of CPT®: ordered and reviewed  Review and summarize past medical records: yes  Discuss the patient with other providers: yes  Independent visualization of images, tracings, or specimens: yes    Risk of Complications, Morbidity, and/or Mortality  Presenting problems: high  Diagnostic procedures: high  Management options: high    Patient Progress  Patient progress: improved (guarded)      Disposition  Final diagnoses:   Hypoxia   Pneumonia due to COVID-19 virus   Acute respiratory failure with hypoxemia ([address] )   Hyponatremia   Leukocytosis     Time reflects when diagnosis was documented in both MDM as applicable and the Disposition within this note     Time User Action Codes Description Comment    1/10/2021  9:55 AM Peggi Balloon Add [R09 02] Hypoxia     1/10/2021  9:55 AM DichterMicheal Cockayne Add [U07 1,  J12 82] Pneumonia due to COVID-19 virus     1/10/2021  9:55 AM DichterMicheal Cockayne Add [J96 01] Acute respiratory failure with hypoxemia ([facility] [address] )     1/10/2021  9:56 AM Peggi Balloon Modify [R09 02] Hypoxia     1/10/2021  9:56 AM Dichter Micheal Cockayne Modify [U07 1,  J12 82] Pneumonia due to COVID-19 virus     1/10/2021  9:56 AM Peggi Balloon Add [E87 1] Hyponatremia     1/10/2021  9:56 AM Peggi Balloon Add [Q25 828] Leukocytosis       ED Disposition     ED Disposition Condition Date/Time Comment    Transfer to Another Facility-In Network  Ch[place] Som 10, 2021  9:55 AM Cody Galan should be transferred out to [facility] [place] [facility] Documentation      Most Recent Value   Patient Condition  The patient has been stabilized such that within reasonable medical probability, no material deterioration of the patient condition or the condition of the unborn child(sonja) is likely to result from the transfer   Reason for Transfer  Level of Care needed not available at this facility, Patient/Family request   Benefits of Transfer  Specialized equipment and/or services available at the receiving facility (Include comment)________________________   Risks of Transfer  Potential for delay in receiving treatment, Potential deterioration of medical condition, Loss of IV, Increased discomfort during transfer, Possible worsening of condition or death during transfer   Accepting Physician  Dr Sukhjinder Austin Name, Reza Morin   Sending MD Dr Buddy Marroquin   Provider Certification  General risk, such as traffic hazards, adverse weather conditions, rough terrain or turbulence, possible failure of equipment (including vehicle or aircraft), or consequences of actions of persons outside the control of the transport personnel      RN Documentation      Most [address] Name, [address] [facility]      Follow-up Information    None         Discharge Medication List as of 1/10/2021 12:01 PM      CONTINUE these medications which have NOT CHANGED    Details   amLODIPine (NORVASC) 5 mg tablet TAKE ONE TABLET BY MOUTH EVERY EVENING, Normal      aspirin 81 MG tablet Take 1 tablet by mouth daily, Historical Med      atorvastatin (LIPITOR) 20 mg tablet TAKE ONE TABLET BY MOUTH EVERY DAY, Normal      carvedilol (COREG) 12 5 mg tablet TAKE 1 TABLET BY MOUTH TWO TIMES A DAY WITH MEALS, Normal      dextromethorphan-guaiFENesin (ROBITUSSIN DM)  mg/5 mL syrup Take 5 mL by mouth 3 (three) times a day as needed for cough, Starting Wed 1/6/2021, Normal      fluticasone (FLONASE) 50 mcg/act nasal spray 2 sprays into each nostril daily as needed for allergies, Starting Tue 7/16/2019, Normal      losartan (COZAAR) 100 MG tablet TAKE ONE TABLET BY MOUTH EVERY DAY, Normal      Milk Thistle 1000 MG CAPS Take 250 mg by mouth , Historical Med      minoxidil (LONITEN) 10 mg tablet TAKE ONE TABLET BY MOUTH ONCE EVERY DAY, Normal      montelukast (SINGULAIR) 10 mg tablet Take 1 tablet (10 mg total) by mouth daily, Starting Thu 12/5/2019, Until Tue 11/24/2020, Normal      naproxen (EC NAPROSYN) 500 MG EC tablet Take 1 tablet (500 mg total) by mouth 2 (two) times a day with meals, Starting Wed 9/25/2019, Normal      Omega-3 Fatty Acids (FISH OIL) 1,000 mg Take by mouth daily, Historical Med      omeprazole (PriLOSEC) 40 MG capsule TAKE ONE CAPSULE BY MOUTH EVERY DAY, Normal      sertraline (ZOLOFT) 50 mg tablet TAKE ONE TABLET BY MOUTH EVERY DAY, Normal      tamsulosin (FLOMAX) 0 4 mg Take 1 capsule (0 4 mg total) by mouth daily with dinner CORRECTED SCRIPT! Note change in SIG and QUANTITY  Please process accordingly  Thank you! (9/1/20), Starting Tue 9/1/2020, Normal      zolpidem (AMBIEN) 10 mg tablet TAKE ONE TABLET BY MOUTH AT BEDTIME AS NEEDED SLEEP, Normal           No discharge procedures on file      [address] Review     None          ED Provider  Electronically Signed by           Mary Prather DO  01/10/21 6227

## 2021-01-10 NOTE — H&P
History and Physical - Critical Care   Reyna Arenas 78 y o  male MRN: [id number]  Unit/Bed#: ICU 08 Encounter: [id number]    Code Status: Level 1 - Full Code  POA:      Reason for Admission / Principal Problem:  COVID-19 pneumonia  HPI: Reyna Arenas is a 78 y o  male who presents with acute hypoxic respiratory failure  The patient has a past medical history significant for urinary retention, CLL who was transferred from [facility] with hypoxia  The patient reports he was in his usual state of health until Saturday January 2nd when he and his wife both developed symptoms of COVID-19  The patient went for COVID-19 testing on Monday January 4th which was positive  He had persistent symptoms and was scheduled to receive Bamlnivumab outpatient infusion  Immediately prior to infusion, the patient was undergoing assessment by the nurse who noted he was hypoxic and hypotensive  The infusion was not administered  Patient was transferred immediately to the ER  In ER, he required high-flow nasal cannula to maintain adequate saturations  The patient received fluid boluses for his hypotension in the ED  The patient reports for the week prior, he had decreased p o  Intake  He reported he was drinking water T and some Gatorade but minimal food  He denies fevers  He reports myalgias, decreased appetite and a cough  He has recently started on guaifenesin  The patient denies diarrhea  History obtained from chart review and the patient      PMH:   Past Medical History:   Diagnosis Date    Anxiety     BPH (benign prostatic hypertrophy)     CLL [facility]chronic lymphocytic leukemia) ([facility] [address] )     2009    Colon polyp     Concussion     Resolved: 08/22/16    Depression     Gastrointestinal hemorrhage     Last assessed: 08/27/13    GERD (gastroesophageal reflux disease)     Hearing loss of aging     Hiatal hernia     History of transfusion     Hyperlipidemia     Hypertension     Iron deficiency anemia     Microscopic hematuria     Last assessed: 13    OA (osteoarthritis)     right hip    Pneumothorax     Prostatitis     Pulmonary hypertension (HCC)     Seasonal allergies     UTI (urinary tract infection)     in past    Wears glasses        PSH:   Past Surgical History:   Procedure Laterality Date    COLONOSCOPY      CYSTOSCOPY  10/09/2014    Diagnostic    CYSTOSCOPY  2020    FRACTURE SURGERY      left lower arm    FRACTURE SURGERY      left femur    HERNIA REPAIR      JOINT REPLACEMENT Right     hip    OTHER SURGICAL HISTORY  2011    Spinal anesthesia epidural    ND TOTAL HIP ARTHROPLASTY Right 2017    Procedure: ARTHROPLASTY HIP TOTAL ANTERIOR;  Surgeon: Ananya Benedict MD;  Location: [facility];  Service: Orthopedics    TONSILLECTOMY AND ADENOIDECTOMY      TRANSURETHRAL RESECTION OF PROSTATE      x 2    WRIST SURGERY         Family History:   Family History   Problem Relation Age of Onset    No Known Problems Mother     Heart attack Father     Diabetes Brother     Prostate cancer Brother        Social History:   Social History     Tobacco Use   Smoking Status Former Smoker    Packs/day: 1 00    Years: 15 00    Pack years: 15     Types: Cigarettes    Start date:     Quit date: 1980    Years since quittin 3   Smokeless Tobacco Never Used      Social History     Substance and Sexual Activity   Alcohol Use Yes    Alcohol/week: 10 0 standard drinks    Types: 10 Cans of beer per week    Frequency: 4 or more times a week    Drinks per session: 3 or 4      Marital Status: /Civil Union    ROS: 14-point ROS is negative and/or as stated in the HPI  Allergies: Allergies   Allergen Reactions    Codeine Other (See Comments)     agitated    Sulfamethoxazole-Trimethoprim GI Intolerance and Abdominal Pain     upset stomach       Home Medications:   Prior to Admission medications    Medication Sig Start Date End Date Taking?  Authorizing Provider tamsulosin (FLOMAX) 0 4 mg Take 1 capsule (0 4 mg total) by mouth daily with dinner CORRECTED SCRIPT! Note change in SIG and QUANTITY  Please process accordingly    Thank you! (9/1/20) 9/1/20  Yes Quang Thomas PA-C   zolpidem (AMBIEN) 10 mg tablet TAKE ONE TABLET BY MOUTH AT BEDTIME AS NEEDED SLEEP 4/30/20  Yes Denise Mustafa MD   amLODIPine (NORVASC) 5 mg tablet TAKE ONE TABLET BY MOUTH EVERY EVENING 8/3/20   Denise Mustafa MD   aspirin 81 MG tablet Take 1 tablet by mouth daily    Historical Provider, MD   atorvastatin (LIPITOR) 20 mg tablet TAKE ONE TABLET BY MOUTH EVERY DAY 8/19/20   Denise Mustafa MD   carvedilol (COREG) 12 5 mg tablet TAKE 1 TABLET BY MOUTH TWO TIMES A DAY WITH MEALS 3/6/20   Denise Mustafa MD   dextromethorphan-guaiFENesin [facility] DM)  mg/5 mL syrup Take 5 mL by mouth 3 (three) times a day as needed for cough 1/6/21   Jim Cortez PA-C   fluticasone [facility]) 50 mcg/act nasal spray 2 sprays into each nostril daily as needed for allergies 7/16/19   Denise Mustafa MD   losartan (COZAAR) 100 MG tablet TAKE ONE TABLET BY MOUTH EVERY DAY 3/6/20   Denise Mustafa MD   Milk Thistle 1000 MG CAPS Take 250 mg by mouth     Historical Provider, MD   minoxidil (LONITEN) 10 mg tablet TAKE ONE TABLET BY MOUTH ONCE EVERY DAY 11/18/20   Denise Mustafa MD   montelukast (SINGULAIR) 10 mg tablet Take 1 tablet (10 mg total) by mouth daily 12/5/19 11/24/20  Denise Mustafa MD   naproxen (EC NAPROSYN) 500 MG EC tablet Take 1 tablet (500 mg total) by mouth 2 (two) times a day with meals  Patient taking differently: Take 500 mg by mouth as needed  9/25/19   Humberto Peng MD   Omega-3 Fatty Acids (FISH OIL) 1,000 mg Take by mouth daily    Historical Provider, MD   omeprazole (PriLOSEC) 40 MG capsule TAKE ONE CAPSULE BY MOUTH EVERY DAY 8/19/20   Denise Mustafa MD   sertraline (ZOLOFT) 50 mg tablet TAKE ONE TABLET BY MOUTH EVERY DAY 4/13/20 Carina Maher MD       Invasive lines and devices: Invasive Devices     Peripheral Intravenous Line            Peripheral IV 01/10/21 Left Antecubital less than 1 day    Peripheral IV 01/10/21 Right Antecubital less than 1 day                Vitals:   Vitals:    01/10/21 1223   SpO2: 91%     SpO2: SpO2: 91 %    Temperature: Temp (24hrs), Av 8 °F (36 6 °C), Min:97 8 °F (36 6 °C), Max:97 8 °F (36 6 °C)  Current:      Weights: There is no height or weight on file to calculate BMI  VTE Pharmacologic Prophylaxis: Enoxaparin (Lovenox)  VTE Mechanical Prophylaxis: sequential compression device    Invasive/non-invasive ventilation settings:   Respiratory    Lab Data (Last 4 hours)    None         O2/Vent Data (Last 4 hours)      01/10 1130 01/10 1223        Non-Invasive Ventilation Mode HFNC (High flow) HFNC (High flow)                Physical Exam  Vitals signs and nursing note reviewed  Constitutional:       General: He is not in acute distress  Appearance: Normal appearance  He is not ill-appearing, toxic-appearing or diaphoretic  HENT:      Head: Normocephalic and atraumatic  Right Ear: External ear normal       Left Ear: External ear normal       Nose: Nose normal       Mouth/Throat:      Mouth: Mucous membranes are moist    Eyes:      Pupils: Pupils are equal, round, and reactive to light  Neck:      Musculoskeletal: Normal range of motion and neck supple  Cardiovascular:      Rate and Rhythm: Normal rate  Pulses: Normal pulses  Heart sounds: Normal heart sounds  Pulmonary:      Effort: Pulmonary effort is normal       Breath sounds: No rales  Chest:      Chest wall: No tenderness  Abdominal:      General: Abdomen is flat  Bowel sounds are normal       Palpations: Abdomen is soft  Musculoskeletal: Normal range of motion  General: No swelling, tenderness, deformity or signs of injury  Skin:     General: Skin is warm and dry     Neurological:      General: No focal deficit present  Mental Status: He is alert and oriented to person, place, and time  Mental status is at baseline  Psychiatric:         Mood and Affect: Mood normal          Behavior: Behavior normal          Thought Content: Thought content normal          Judgment: Judgment normal        Labs:   Results from last 7 days   Lab Units 01/10/21  1250 01/10/21  0931 01/10/21  0920 01/10/21  0853   WBC Thousand/uL  --   --   --  50 92*   HEMOGLOBIN g/dL  --   --   --  9 1*   I STAT HEMOGLOBIN g/dl  --  8 2* 7 5*  --    HEMATOCRIT %  --   --   --  26 5*   HEMATOCRIT, ISTAT %  --  24* 22*  --    PLATELETS Thousands/uL 220  --   --  241   MONO PCT %  --   --   --  1*     Results from last 7 days   Lab Units 01/10/21  1250 01/10/21  0931 01/10/21  0920 01/10/21  0852   POTASSIUM mmol/L 4 4  --   --  4 5   CHLORIDE mmol/L 88*  --   --  87*   CO2 mmol/L 24  --   --  26   CO2, I-STAT mmol/L  --  25 25  --    BUN mg/dL 26*  --   --  28*   CREATININE mg/dL 1 13  --   --  1 22*   CALCIUM mg/dL 8 4  --   --  8 8   ALK PHOS U/L 116  --   --  103 8   ALT U/L 51  --   --  45   AST U/L 53*  --   --  50*   GLUCOSE, ISTAT mg/dl  --  130 132  --      Results from last 7 days   Lab Units 01/10/21  0852   MAGNESIUM mg/dL 1 9     Results from last 7 days   Lab Units 01/10/21  0852   INR  1 18*   PTT seconds 35*     No results found for: TROPONINT  ABG:No results found for: PHART, EXX9VXB, PO2ART, GSV5MDJ, Y7IKIHKW, BEART, SOURCE    Imaging: CXR 1/10/21 bilateral ground glass opacities  I have personally reviewed pertinent films in PACS     EKG: Sinus rhythm  This was personally reviewed by myself       Micro:  Blood Culture: No results found for: BLOODCX  Urine Culture:   Lab Results   Component Value Date    URINECX >100,000 cfu/ml Klebsiella pneumoniae (A) 10/20/2020    URINECX 50,000-59,000 cfu/ml  03/02/2020    URINECX No Growth <1000 cfu/mL 01/10/2020    URINECX  10/21/2016     8850-5649 cfu/ml beta hemolytic Streptococcus Group B     Sputum Culture: No components found for: SPUTUMCX  Wound Culure: No results found for: WOUNDCULT    Impression:  Patient Active Problem List   Diagnosis    Essential hypertension    [facility]    Primary osteoarthritis of right hip    Allergic rhinitis due to pollen    Erectile dysfunction of non-organic origin    Esophageal reflux    Pulmonary hypertension ([facility] )    Spinal stenosis of lumbar region    Enlarged prostate without lower urinary tract symptoms (luts)    Iron deficiency anemia    Insomnia    Arthritis of right hip    Abnormal EKG    DDD (degenerative disc disease), cervical    DIXIE not currently treated    Hypercholesterolemia    Mild depression ([facility] )    History of colon polyps    Diverticulitis    Splenomegaly    Prostate cancer screening    BPH with obstruction/lower urinary tract symptoms    Hyponatremia    Urinary retention    Alcohol abuse    Hyperglycemia    COVID-19       Plan:  Patient is a 24-year-old male with past medical history significant for CLL who was transferred from [address] [place] [address] after presenting for monoclonal antibody infusion for COVID-19 but did not receive this as he was hypotensive and hypoxic  In the ED, patient was fluid resuscitated and started high-flow nasal cannula and transferred to [place] for further evaluation      Neuro:   depression  -  + facial tic  -  Start melatonin  -  Hold zoloft    CV: Pulmonary hypertension; HTN  -  Not currently on any out patient therapy  -  No indication for diuresis  -  Hold home antihypertensive medications    Lung:  Acute hypoxic respiratory failure in setting of COVID-19 pneumonia  -  Currently requiring HFNC 30%/60L  -  Start severe covid-19 protocol  -  Start ramdesivir, decadron  -  Continue ceftriaxone/ doxycycline   -  procal pending  -  Pt would accept convalescent plasma  -  Start multivitamins, atorvastatin    Renal:  Hyponatremia; PHAM (baseline 1 2)  -  Likely in setting of decreased PO intake  -  Repeat BMP  -  Monitor urine output  -  Check urine na    GI:   -  Last BM PTA  -  Start regular diet    :  Urinary retention  -  Straight caths at home q12h    ID:   covid 19 pneumonia  -  Empiric abx for community acquired pneumonia pending procal    Heme: CLL  -  Start lovenox 30mg bid    Endo:   --  Blood glucose well controlled    Disposition:   Monitor on SD1    CC time for this patient is 0 not including time for procedures documented elsewhere or time spent teaching

## 2021-01-11 ENCOUNTER — TELEMEDICINE (OUTPATIENT)
Dept: FAMILY MEDICINE CLINIC | Facility: CLINIC | Age: 71
End: 2021-01-11

## 2021-01-11 DIAGNOSIS — U07.1 COVID-19: Primary | ICD-10-CM

## 2021-01-11 LAB
ABO GROUP BLD: NORMAL
ALBUMIN SERPL BCP-MCNC: 2.5 G/DL (ref 3.5–5)
ALP SERPL-CCNC: 107 U/L (ref 46–116)
ALT SERPL W P-5'-P-CCNC: 73 U/L (ref 12–78)
ANION GAP SERPL CALCULATED.3IONS-SCNC: 11 MMOL/L (ref 4–13)
ANION GAP SERPL CALCULATED.3IONS-SCNC: 9 MMOL/L (ref 4–13)
ANION GAP SERPL CALCULATED.3IONS-SCNC: 9 MMOL/L (ref 4–13)
ANISOCYTOSIS BLD QL SMEAR: PRESENT
AST SERPL W P-5'-P-CCNC: 100 U/L (ref 5–45)
ATRIAL RATE: 124 BPM
BASOPHILS # BLD MANUAL: 0 THOUSAND/UL (ref 0–0.1)
BASOPHILS NFR MAR MANUAL: 0 % (ref 0–1)
BILIRUB DIRECT SERPL-MCNC: 1.01 MG/DL (ref 0–0.2)
BILIRUB SERPL-MCNC: 1.49 MG/DL (ref 0.2–1)
BLD GP AB SCN SERPL QL: POSITIVE
BLOOD GROUP ANTIBODIES SERPL: NORMAL
BUN SERPL-MCNC: 20 MG/DL (ref 5–25)
BUN SERPL-MCNC: 21 MG/DL (ref 5–25)
BUN SERPL-MCNC: 26 MG/DL (ref 5–25)
BURR CELLS BLD QL SMEAR: PRESENT
CA-I BLD-SCNC: 1.07 MMOL/L (ref 1.12–1.32)
CALCIUM SERPL-MCNC: 8.3 MG/DL (ref 8.3–10.1)
CALCIUM SERPL-MCNC: 8.5 MG/DL (ref 8.3–10.1)
CALCIUM SERPL-MCNC: 8.5 MG/DL (ref 8.3–10.1)
CHLORIDE SERPL-SCNC: 92 MMOL/L (ref 100–108)
CHLORIDE SERPL-SCNC: 93 MMOL/L (ref 100–108)
CHLORIDE SERPL-SCNC: 94 MMOL/L (ref 100–108)
CO2 SERPL-SCNC: 22 MMOL/L (ref 21–32)
CO2 SERPL-SCNC: 24 MMOL/L (ref 21–32)
CO2 SERPL-SCNC: 24 MMOL/L (ref 21–32)
CREAT SERPL-MCNC: 0.78 MG/DL (ref 0.6–1.3)
CREAT SERPL-MCNC: 0.88 MG/DL (ref 0.6–1.3)
CREAT SERPL-MCNC: 0.91 MG/DL (ref 0.6–1.3)
CRP SERPL QL: 308.2 MG/L
D DIMER PPP FEU-MCNC: 1 UG/ML FEU
EOSINOPHIL # BLD MANUAL: 0 THOUSAND/UL (ref 0–0.4)
EOSINOPHIL NFR BLD MANUAL: 0 % (ref 0–6)
ERYTHROCYTE [DISTWIDTH] IN BLOOD BY AUTOMATED COUNT: 14.3 % (ref 11.6–15.1)
FERRITIN SERPL-MCNC: 1955 NG/ML (ref 8–388)
GFR SERPL CREATININE-BSD FRML MDRD: 86 ML/MIN/1.73SQ M
GFR SERPL CREATININE-BSD FRML MDRD: 88 ML/MIN/1.73SQ M
GFR SERPL CREATININE-BSD FRML MDRD: 93 ML/MIN/1.73SQ M
GLUCOSE SERPL-MCNC: 153 MG/DL (ref 65–140)
GLUCOSE SERPL-MCNC: 165 MG/DL (ref 65–140)
GLUCOSE SERPL-MCNC: 176 MG/DL (ref 65–140)
HBV CORE AB SER QL: NORMAL
HBV CORE IGM SER QL: NORMAL
HBV SURFACE AG SER QL: NORMAL
HCT VFR BLD AUTO: 26.9 % (ref 36.5–49.3)
HCV AB SER QL: NORMAL
HGB BLD-MCNC: 8.7 G/DL (ref 12–17)
HYPERCHROMIA BLD QL SMEAR: PRESENT
LYMPHOCYTES # BLD AUTO: 48.2 THOUSAND/UL (ref 0.6–4.47)
LYMPHOCYTES # BLD AUTO: 74 % (ref 14–44)
MAGNESIUM SERPL-MCNC: 2 MG/DL (ref 1.6–2.6)
MCH RBC QN AUTO: 32.1 PG (ref 26.8–34.3)
MCHC RBC AUTO-ENTMCNC: 32.3 G/DL (ref 31.4–37.4)
MCV RBC AUTO: 99 FL (ref 82–98)
MONOCYTES # BLD AUTO: 3.26 THOUSAND/UL (ref 0–1.22)
MONOCYTES NFR BLD: 5 % (ref 4–12)
NEUTROPHILS # BLD MANUAL: 11.07 THOUSAND/UL (ref 1.85–7.62)
NEUTS BAND NFR BLD MANUAL: 2 % (ref 0–8)
NEUTS SEG NFR BLD AUTO: 15 % (ref 43–75)
NRBC BLD AUTO-RTO: 0 /100 WBCS
P AXIS: 104 DEGREES
PHOSPHATE SERPL-MCNC: 3.4 MG/DL (ref 2.3–4.1)
PLATELET # BLD AUTO: 237 THOUSANDS/UL (ref 149–390)
PLATELET BLD QL SMEAR: ADEQUATE
PMV BLD AUTO: 9.6 FL (ref 8.9–12.7)
POIKILOCYTOSIS BLD QL SMEAR: PRESENT
POTASSIUM SERPL-SCNC: 4.1 MMOL/L (ref 3.5–5.3)
POTASSIUM SERPL-SCNC: 4.1 MMOL/L (ref 3.5–5.3)
POTASSIUM SERPL-SCNC: 4.2 MMOL/L (ref 3.5–5.3)
PR INTERVAL: 196 MS
PROCALCITONIN SERPL-MCNC: 0.14 NG/ML
PROT SERPL-MCNC: 7.1 G/DL (ref 6.4–8.2)
QRS AXIS: 45 DEGREES
QRSD INTERVAL: 89 MS
QT INTERVAL: 468 MS
QTC INTERVAL: 502 MS
RBC # BLD AUTO: 2.71 MILLION/UL (ref 3.88–5.62)
RBC MORPH BLD: PRESENT
RH BLD: POSITIVE
SMUDGE CELLS BLD QL SMEAR: PRESENT
SODIUM 24H UR-SCNC: 8 MOL/L
SODIUM SERPL-SCNC: 125 MMOL/L (ref 136–145)
SODIUM SERPL-SCNC: 126 MMOL/L (ref 136–145)
SODIUM SERPL-SCNC: 127 MMOL/L (ref 136–145)
SPECIMEN EXPIRATION DATE: NORMAL
T WAVE AXIS: 150 DEGREES
TOTAL CELLS COUNTED SPEC: 100
VARIANT LYMPHS # BLD AUTO: 4 %
VENTRICULAR RATE: 69 BPM
WBC # BLD AUTO: 65.14 THOUSAND/UL (ref 4.31–10.16)

## 2021-01-11 PROCEDURE — 86921 COMPATIBILITY TEST INCUBATE: CPT

## 2021-01-11 PROCEDURE — 80048 BASIC METABOLIC PNL TOTAL CA: CPT | Performed by: NURSE PRACTITIONER

## 2021-01-11 PROCEDURE — XW033E5 INTRODUCTION OF REMDESIVIR ANTI-INFECTIVE INTO PERIPHERAL VEIN, PERCUTANEOUS APPROACH, NEW TECHNOLOGY GROUP 5: ICD-10-PCS | Performed by: INTERNAL MEDICINE

## 2021-01-11 PROCEDURE — 85007 BL SMEAR W/DIFF WBC COUNT: CPT | Performed by: NURSE PRACTITIONER

## 2021-01-11 PROCEDURE — 94003 VENT MGMT INPAT SUBQ DAY: CPT

## 2021-01-11 PROCEDURE — 86870 RBC ANTIBODY IDENTIFICATION: CPT | Performed by: INTERNAL MEDICINE

## 2021-01-11 PROCEDURE — 82330 ASSAY OF CALCIUM: CPT | Performed by: NURSE PRACTITIONER

## 2021-01-11 PROCEDURE — 86900 BLOOD TYPING SEROLOGIC ABO: CPT | Performed by: NURSE PRACTITIONER

## 2021-01-11 PROCEDURE — 94762 N-INVAS EAR/PLS OXIMTRY CONT: CPT

## 2021-01-11 PROCEDURE — 97110 THERAPEUTIC EXERCISES: CPT

## 2021-01-11 PROCEDURE — NC001 PR NO CHARGE: Performed by: INTERNAL MEDICINE

## 2021-01-11 PROCEDURE — 85379 FIBRIN DEGRADATION QUANT: CPT | Performed by: NURSE PRACTITIONER

## 2021-01-11 PROCEDURE — 99233 SBSQ HOSP IP/OBS HIGH 50: CPT | Performed by: INTERNAL MEDICINE

## 2021-01-11 PROCEDURE — 86140 C-REACTIVE PROTEIN: CPT | Performed by: NURSE PRACTITIONER

## 2021-01-11 PROCEDURE — 83735 ASSAY OF MAGNESIUM: CPT | Performed by: NURSE PRACTITIONER

## 2021-01-11 PROCEDURE — 84100 ASSAY OF PHOSPHORUS: CPT | Performed by: NURSE PRACTITIONER

## 2021-01-11 PROCEDURE — 80076 HEPATIC FUNCTION PANEL: CPT | Performed by: PHYSICIAN ASSISTANT

## 2021-01-11 PROCEDURE — 86850 RBC ANTIBODY SCREEN: CPT | Performed by: NURSE PRACTITIONER

## 2021-01-11 PROCEDURE — 85027 COMPLETE CBC AUTOMATED: CPT | Performed by: NURSE PRACTITIONER

## 2021-01-11 PROCEDURE — 94660 CPAP INITIATION&MGMT: CPT

## 2021-01-11 PROCEDURE — 84145 PROCALCITONIN (PCT): CPT | Performed by: NURSE PRACTITIONER

## 2021-01-11 PROCEDURE — 86901 BLOOD TYPING SEROLOGIC RH(D): CPT | Performed by: NURSE PRACTITIONER

## 2021-01-11 PROCEDURE — 97163 PT EVAL HIGH COMPLEX 45 MIN: CPT

## 2021-01-11 PROCEDURE — 82728 ASSAY OF FERRITIN: CPT | Performed by: NURSE PRACTITIONER

## 2021-01-11 PROCEDURE — 94002 VENT MGMT INPAT INIT DAY: CPT

## 2021-01-11 PROCEDURE — 93010 ELECTROCARDIOGRAM REPORT: CPT | Performed by: INTERNAL MEDICINE

## 2021-01-11 PROCEDURE — 86922 COMPATIBILITY TEST ANTIGLOB: CPT

## 2021-01-11 PROCEDURE — 80048 BASIC METABOLIC PNL TOTAL CA: CPT | Performed by: PHYSICIAN ASSISTANT

## 2021-01-11 PROCEDURE — XW13325 TRANSFUSION OF CONVALESCENT PLASMA (NONAUTOLOGOUS) INTO PERIPHERAL VEIN, PERCUTANEOUS APPROACH, NEW TECHNOLOGY GROUP 5: ICD-10-PCS | Performed by: INTERNAL MEDICINE

## 2021-01-11 PROCEDURE — 94760 N-INVAS EAR/PLS OXIMETRY 1: CPT

## 2021-01-11 RX ORDER — FOLIC ACID 1 MG/1
1 TABLET ORAL DAILY
Status: DISCONTINUED | OUTPATIENT
Start: 2021-01-12 | End: 2021-01-21 | Stop reason: HOSPADM

## 2021-01-11 RX ORDER — POLYVINYL ALCOHOL 14 MG/ML
1 SOLUTION/ DROPS OPHTHALMIC
Status: DISCONTINUED | OUTPATIENT
Start: 2021-01-11 | End: 2021-01-11

## 2021-01-11 RX ORDER — SODIUM CHLORIDE 9 MG/ML
50 INJECTION, SOLUTION INTRAVENOUS CONTINUOUS
Status: DISCONTINUED | OUTPATIENT
Start: 2021-01-11 | End: 2021-01-13

## 2021-01-11 RX ORDER — THIAMINE MONONITRATE (VIT B1) 100 MG
100 TABLET ORAL DAILY
Status: DISCONTINUED | OUTPATIENT
Start: 2021-01-12 | End: 2021-01-13

## 2021-01-11 RX ADMIN — TAMSULOSIN HYDROCHLORIDE 0.4 MG: 0.4 CAPSULE ORAL at 15:35

## 2021-01-11 RX ADMIN — REMDESIVIR 100 MG: 100 INJECTION, POWDER, LYOPHILIZED, FOR SOLUTION INTRAVENOUS at 15:35

## 2021-01-11 RX ADMIN — GUAIFENESIN AND DEXTROMETHORPHAN 10 ML: 100; 10 SYRUP ORAL at 21:14

## 2021-01-11 RX ADMIN — OXYCODONE HYDROCHLORIDE AND ACETAMINOPHEN 1000 MG: 500 TABLET ORAL at 08:39

## 2021-01-11 RX ADMIN — SODIUM CHLORIDE 50 ML/HR: 0.9 INJECTION, SOLUTION INTRAVENOUS at 17:02

## 2021-01-11 RX ADMIN — DEXAMETHASONE SODIUM PHOSPHATE 8.5 MG: 10 INJECTION, SOLUTION INTRAMUSCULAR; INTRAVENOUS at 21:00

## 2021-01-11 RX ADMIN — ZOLPIDEM TARTRATE 10 MG: 5 TABLET ORAL at 21:00

## 2021-01-11 RX ADMIN — ATORVASTATIN CALCIUM 40 MG: 40 TABLET, FILM COATED ORAL at 21:00

## 2021-01-11 RX ADMIN — ZOLPIDEM TARTRATE 10 MG: 5 TABLET ORAL at 01:22

## 2021-01-11 RX ADMIN — ASPIRIN 81 MG: 81 TABLET ORAL at 08:42

## 2021-01-11 RX ADMIN — CHLORHEXIDINE GLUCONATE 0.12% ORAL RINSE 15 ML: 1.2 LIQUID ORAL at 08:39

## 2021-01-11 RX ADMIN — CEFTRIAXONE SODIUM 1000 MG: 10 INJECTION, POWDER, FOR SOLUTION INTRAVENOUS at 08:38

## 2021-01-11 RX ADMIN — ENOXAPARIN SODIUM 30 MG: 30 INJECTION SUBCUTANEOUS at 21:00

## 2021-01-11 RX ADMIN — SODIUM CHLORIDE 50 ML/HR: 0.9 INJECTION, SOLUTION INTRAVENOUS at 01:23

## 2021-01-11 RX ADMIN — DOCUSATE SODIUM AND SENNOSIDES 1 TABLET: 8.6; 5 TABLET ORAL at 21:01

## 2021-01-11 RX ADMIN — DOCUSATE SODIUM AND SENNOSIDES 1 TABLET: 8.6; 5 TABLET ORAL at 01:23

## 2021-01-11 RX ADMIN — ZINC SULFATE 220 MG (50 MG) CAPSULE 220 MG: CAPSULE at 08:39

## 2021-01-11 RX ADMIN — OXYCODONE HYDROCHLORIDE AND ACETAMINOPHEN 1000 MG: 500 TABLET ORAL at 21:00

## 2021-01-11 RX ADMIN — MONTELUKAST SODIUM 10 MG: 10 TABLET, FILM COATED ORAL at 08:39

## 2021-01-11 RX ADMIN — Medication 2000 UNITS: at 08:38

## 2021-01-11 RX ADMIN — FAMOTIDINE 20 MG: 20 TABLET ORAL at 21:00

## 2021-01-11 RX ADMIN — ENOXAPARIN SODIUM 30 MG: 30 INJECTION SUBCUTANEOUS at 08:39

## 2021-01-11 RX ADMIN — SERTRALINE HYDROCHLORIDE 50 MG: 50 TABLET ORAL at 08:42

## 2021-01-11 RX ADMIN — CHLORHEXIDINE GLUCONATE 0.12% ORAL RINSE 15 ML: 1.2 LIQUID ORAL at 21:01

## 2021-01-11 RX ADMIN — DOXYCYCLINE 100 MG: 100 CAPSULE ORAL at 08:38

## 2021-01-11 RX ADMIN — OMEGA-3 FATTY ACIDS CAP 1000 MG 1000 MG: 1000 CAP at 08:39

## 2021-01-11 RX ADMIN — DEXAMETHASONE SODIUM PHOSPHATE 8.5 MG: 10 INJECTION, SOLUTION INTRAMUSCULAR; INTRAVENOUS at 08:42

## 2021-01-11 RX ADMIN — FAMOTIDINE 20 MG: 20 TABLET ORAL at 08:42

## 2021-01-11 RX ADMIN — MELATONIN 6 MG: at 21:01

## 2021-01-11 RX ADMIN — POLYETHYLENE GLYCOL 3350 17 G: 17 POWDER, FOR SOLUTION ORAL at 08:39

## 2021-01-11 NOTE — PLAN OF CARE
Problem: PHYSICAL THERAPY ADULT  Goal: Performs mobility at highest level of function for planned discharge setting  See evaluation for individualized goals  Description: Treatment/Interventions: Functional transfer training, LE strengthening/ROM, Elevations, Therapeutic exercise, Endurance training, Patient/family training, Equipment eval/education, Bed mobility, Gait training          See flowsheet documentation for full assessment, interventions and recommendations  Outcome: Progressing  Note: Prognosis: Good  Problem List: Decreased strength, Decreased endurance, Impaired balance, Decreased mobility, Decreased safety awareness, Impaired hearing  Assessment: Pt was introduced to therapeutic exercises to address mobility and physical deficits noted during eval  Pt needed rest breaks while completing exercises due to fatigue  Input was necessary to complete all reps and maintain technique  Pt needs further input to improve independence w/ completing exercise program  continued PT intervention is indicated to reduce fall risk  PT Discharge Recommendation: Return to previous environment with social support, Home with skilled therapy(home PT)          See flowsheet documentation for full assessment

## 2021-01-11 NOTE — PROGRESS NOTES
Convalescent Plasma was ordered on 1/11/2021  The Fact Sheet for Patients and Parents/Caregivers was provided to the patient and/or healthcare agent  The option to accept or refuse administration was offered  The risks, benefits, and alternatives to treatment were reviewed, and all questions addressed  Disclosure that this treatment is authorized for use under EUA and not FDA approved for treatment was discussed

## 2021-01-11 NOTE — PROGRESS NOTES
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  Patient very anxious and home zoloft and ambien restarted  Patient remains on HFNC 30LPM 60%  His Spo2 dropped when he was OOB to low 80's and NRB placed for recovery  Patient with chronic hyponatremia  NS infusion started overnight      ---------------------------------------------------------------------------------------  SUBJECTIVE  Patient attempting to self prone  He is short of breath, worse with ambulating  Complains of dry cough  Denies chest pain, abdominal pain  Review of Systems   Constitutional: Negative for fever  Respiratory: Positive for cough and shortness of breath  Negative for chest tightness and wheezing  Cardiovascular: Negative for chest pain, palpitations and leg swelling  Gastrointestinal: Negative for abdominal pain  Genitourinary: Positive for difficulty urinating  Neurological: Positive for weakness  Psychiatric/Behavioral: Negative for behavioral problems  Review of systems was reviewed and negative unless stated above in HPI/24-hour events   ---------------------------------------------------------------------------------------  Assessment and Plan:    Neuro:    Diagnosis: Depression/Anxiety  o Plan:   - Continue Zoloft   - Continue Ambien   - Continue melatonin HS   Diagnosis:  At risk for delirium   o Plan:   - Delirium precautions  - Frequent neuro exams  - CAM-ICU daily   - Regulate sleep/wake cycle       CV:    Diagnosis: HTN  o Plan:   - Home meds: norvasc 5mg, aspirin 81mg, coreg 12 5mg BID, cozaar 100mg daily  - Home antihypertensives on hold in the setting of hypotension   - Restart when able   - Echo 4/3/2019: EF 60%  - Diurese as needed    Diagnosis: Hypotension   o Plan:   - /52 in ED   - S/p 3L IVF   - Continue to cardio-pulmonary monitoring       Pulm:   Diagnosis: Acute hypoxic respiratory failure in the setting of Covid PNA   o Plan:   - Continue severe covid protocol   - Currently requiring HFNC 30LPM/60%  - Maintain SpO2>90%  - Pulmonary hygeine  - Chest PT  - Self prone/OOB   -    Diagnosis: Pulmonary HTN  o Plan:   - No outpatient therapy      GI:    Diagnosis: GERD  o Plan:   - Continue pepcid       :    Diagnosis: Hyponatremia  o Plan:   - BMP q4   - NS @50cc/hr   - Slow repletion of Sodium not to exceed >4mEq/hr    Diagnosis: PHAM  o Plan:   - Likely in the setting of poor PO intake   - Baseline Cr: 0 85-0 87  - Admission Cr: 1 22, trending down   - Continue to trend BUN/Cr  - Strict I/O   Diagnosis: Chronic urinary retention with BPH   o Plan:   - Patient straight caths every AM and HS but also urinates himself through out the day   - Continue flomax   - CT A/P: Markedly distended bladder with circumferential wall thickening and multiple large diverticula  Correlate clinically for urinary retention and the need for urinary catheterization  F/E/N:    Plan:   o Fluids: NS @50cc/hr   o Electrolytes:  Will replete as necessary to maintain potassium > 4 0, magnesium > 2 0, and phosphrous > 3 0    o Nutrition: Regular diet       Heme/Onc:    Diagnosis: CLL  o Plan:   - Start lovenox 30mg BID   - Follows with outpatient oncology    Diagnosis: Leukocytosis  o Plan:   - WBC count on admission 50 92  - Continue to trend blood counts   - Platelets 660   - Transfuse if Hgb <7       Endo:    Diagnosis: No acute issues   o Plan:   - Continue to trend glucose  - Blood glucose goal 140-180       ID:    Diagnosis: Covid PNA   o Plan:   - Continue severe pathway - Continue Remdesivir Day   - Continue Decadron Day 2/10   - Convalescent plasma   - Continue ceftriaxone/doxy   - Procal: 0 21  - Continue DMV  - Atorvastatin   - Pepcid  - Vit C, Vit D, Zinc       MSK/Skin:    Diagnosis: No acute issues   o Plan:   - Frequent turning/pressure offloading   - Local wound care as needed   - PT/OT      Disposition: Continue Stepdown Level 1 level of care   Code Status: Level 1 - Full Code  ---------------------------------------------------------------------------------------  ICU CORE MEASURES    Prophylaxis   VTE Pharmacologic Prophylaxis: Enoxaparin (Lovenox)  VTE Mechanical Prophylaxis: sequential compression device  Stress Ulcer Prophylaxis: Famotidine PO    ABCDE Protocol (if indicated)  Plan to perform spontaneous awakening trial today? Not applicable  Plan to perform spontaneous breathing trial today? Not applicable  Obvious barriers to extubation? Not applicable  CAM-ICU: Negative    Invasive Devices Review  Invasive Devices     Peripheral Intravenous Line            Peripheral IV 01/10/21 Left Antecubital less than 1 day    Peripheral IV 01/10/21 Right Antecubital less than 1 day              Can any invasive devices be discontinued today?  No  ---------------------------------------------------------------------------------------  OBJECTIVE    Vitals   Vitals:    01/10/21 1523 01/10/21 1900 01/10/21 2008 01/10/21 2231   BP:  108/64  113/58   BP Location:  Left arm  Left arm   Pulse:  66  75   Resp:  (!) 31  (!) 57   Temp:  (!) 97 °F (36 1 °C)     TempSrc:  Oral     SpO2: 91% 93% 93% (!) 87%     Temp (24hrs), Av 4 °F (36 3 °C), Min:97 °F (36 1 °C), Max:97 8 °F (36 6 °C)  Current: Temperature: (!) 97 °F (36 1 °C)    Respiratory:  SpO2: SpO2: 91 %, SpO2 Activity: SpO2 Activity: At Rest, SpO2 Device: O2 Device: High flow nasal cannula       Invasive/non-invasive ventilation settings   Respiratory    Lab Data (Last 4 hours)    None         O2/Vent Data (Last 4 hours)      01/10 2008          Non-Invasive Ventilation Mode HFNC (High flow)                   Physical Exam  Constitutional:       General: He is not in acute distress  Appearance: He is obese  He is not ill-appearing or diaphoretic  HENT:      Head: Normocephalic and atraumatic  Nose: Nose normal       Mouth/Throat:      Mouth: Mucous membranes are moist       Pharynx: Oropharynx is clear  Eyes:      General: No scleral icterus  Conjunctiva/sclera: Conjunctivae normal       Pupils: Pupils are equal, round, and reactive to light  Neck:      Musculoskeletal: Normal range of motion and neck supple  Cardiovascular:      Rate and Rhythm: Normal rate and regular rhythm  Heart sounds: Normal heart sounds  Pulmonary:      Effort: Pulmonary effort is normal  No respiratory distress  Breath sounds: Wheezing present  Abdominal:      General: There is no distension  Palpations: Abdomen is soft  Tenderness: There is no abdominal tenderness  Musculoskeletal:      Right lower leg: No edema  Left lower leg: No edema  Skin:     General: Skin is warm and dry  Capillary Refill: Capillary refill takes less than 2 seconds  Coloration: Skin is not jaundiced  Neurological:      General: No focal deficit present  Mental Status: He is alert and oriented to person, place, and time           Laboratory and Diagnostics:  Results from last 7 days   Lab Units 01/10/21  1250 01/10/21  0931 01/10/21  0920 01/10/21  0853   WBC Thousand/uL  --   --   --  50 92*   HEMOGLOBIN g/dL  --   --   --  9 1*   I STAT HEMOGLOBIN g/dl  --  8 2* 7 5*  --    HEMATOCRIT %  --   --   --  26 5*   HEMATOCRIT, ISTAT %  --  24* 22*  --    PLATELETS Thousands/uL 220  --   --  241   BANDS PCT %  --   --   --  1   MONO PCT %  --   --   --  1*     Results from last 7 days   Lab Units 01/10/21  2238 01/10/21  1250 01/10/21  0931 01/10/21  0920 01/10/21  0852   SODIUM mmol/L 124* 121*  --   --  122* POTASSIUM mmol/L 4 2 4 4  --   --  4 5   CHLORIDE mmol/L 91* 88*  --   --  87*   CO2 mmol/L 23 24  --   --  26   CO2, I-STAT mmol/L  --   --  25 25  --    ANION GAP mmol/L 10 9  --   --  9   BUN mg/dL 26* 26*  --   --  28*   CREATININE mg/dL 1 06 1 13  --   --  1 22*   CALCIUM mg/dL 8 4 8 4  --   --  8 8   GLUCOSE RANDOM mg/dL 173* 143*  --   --  127   ALT U/L  --  51  --   --  45   AST U/L  --  53*  --   --  50*   ALK PHOS U/L  --  116  --   --  103 8   ALBUMIN g/dL  --  2 7*  --   --  3 7   TOTAL BILIRUBIN mg/dL  --  1 97*  --   --  2 77*     Results from last 7 days   Lab Units 01/10/21  0852   MAGNESIUM mg/dL 1 9      Results from last 7 days   Lab Units 01/10/21  0852   INR  1 18*   PTT seconds 35*      Results from last 7 days   Lab Units 01/10/21  0852   TROPONIN I ng/mL <0 03     Results from last 7 days   Lab Units 01/10/21  0852   LACTIC ACID mmol/L 0 7     ABG:    VBG:    Results from last 7 days   Lab Units 01/10/21  1250 01/10/21  0852   PROCALCITONIN ng/ml 0 21 0 26*       Micro  Results from last 7 days   Lab Units 01/10/21  1012 01/10/21  0852   BLOOD CULTURE  Received in Microbiology Lab  Culture in Progress  Received in Microbiology Lab  Culture in Progress  EKG: Reviewed      Imaging:  I have personally reviewed pertinent films in PACS    Intake and Output  I/O       01/09 0701 - 01/10 0700 01/10 0701 - 01/11 0700    Urine  925    Total Output  925    Net  -925                Height and Weights         There is no height or weight on file to calculate BMI  Weight (last 2 days)     None            Nutrition       Diet Orders   (From admission, onward)             Start     Ordered    01/10/21 1624  Room Service  Once     Question:  Type of Service  Answer:  Room Service - Appropriate with Assistance    01/10/21 1623    01/10/21 1220  Diet Regular; Regular House  Diet effective now     Question Answer Comment   Diet Type Regular    Regular Regular House    RD to adjust diet per protocol? Yes        01/10/21 1219                    Active Medications  Scheduled Meds:  Current Facility-Administered Medications   Medication Dose Route Frequency Provider Last Rate    albuterol  2 puff Inhalation Q4H PRN MATHEW Gomez      ascorbic acid  1,000 mg Oral Q12H [address] [place] Shruthi, MATHEW      aspirin  81 mg Oral Daily Devan Hawkins, MATHEW      atorvastatin  40 mg Oral HS [facility] MATHEW Stiles      [START ON 1/11/2021] cefTRIAXone  1,000 mg Intravenous Q24H Devan Hawkins, MATHEW      chlorhexidine  15 mL Swish & Spit Q12H [address] [facility], MATHEW      cholecalciferol  2,000 Units Oral Daily [facility]      dexamethasone  0 1 mg/kg Intravenous Q12H Devan Hawkins, MATHEW      dextromethorphan-guaiFENesin  10 mL Oral Q4H PRN Pamela Muniz PA-C      doxycycline hyclate  100 mg Oral Q12H [address] Devan Hawkins, MATHEW      enoxaparin  30 mg Subcutaneous Q12H [address] Devan Hawkins, MATHEW      famotidine  20 mg Oral BID Sullivan Breath, MD      fish oil  1,000 mg Oral Daily Devan Sultanaaves, MATHEW      fluticasone  2 spray Nasal Daily PRN MATHEW Gomez      melatonin  6 mg Oral HS Devan Hawkins, MATHEW      montelukast  10 mg Oral Daily Devan Sultanaaves, MATHEW      zinc sulfate  220 mg Oral Daily MATHEW Gomez      Followed by   Ruben Degree ON 1/17/2021] multivitamin-minerals  1 tablet Oral Daily MATHEW Gomez      [START ON 1/11/2021] polyethylene glycol  17 g Oral Daily Geovanny Dotson PA-C      [START ON 1/11/2021] remdesivir  100 mg Intravenous Q24H MATHEW Gomez      senna-docusate sodium  1 tablet Oral HS Geovanny Dotson PA-C      sertraline  50 mg Oral Daily Devan Hawkins, MATHEW      sodium chloride  50 mL/hr Intravenous Continuous Geovanny Dotson PA-C      [START ON 1/11/2021] sodium chloride  1 g Oral TID With Meals Geovanny Dotson PA-C      tamsulosin  0 4 mg Oral Daily With MATHEW Miles      zolpidem  10 mg Oral HS Radha Hong PA-C       Continuous Infusions:  sodium chloride, 50 mL/hr      PRN Meds:   albuterol, 2 puff, Q4H PRN  dextromethorphan-guaiFENesin, 10 mL, Q4H PRN  fluticasone, 2 spray, Daily PRN        Allergies   Allergies   Allergen Reactions    Codeine Other (See Comments)     agitated    Sulfamethoxazole-Trimethoprim GI Intolerance and Abdominal Pain     upset stomach     ---------------------------------------------------------------------------------------  Advance Directive and Living Will: Yes    Power of :    POLST:    ---------------------------------------------------------------------------------------  Care Time Delivered:   No Critical Care time spent     Radha Hong PA-C

## 2021-01-11 NOTE — PROGRESS NOTES
The patient was put on for a visit but he is actually in the ICU  I did call him and we had a face time visit  He is on high-level O2  I discussed his treatment plan with him

## 2021-01-11 NOTE — PHYSICAL THERAPY NOTE
PHYSICAL THERAPY TREATMENT NOTE      Patient Name: Ivis Rebollar Date: 1/11/2021 01/11/21 8550   PT Last Visit   PT Visit Date 01/11/21   Note Type   Note Type Treatment   Pain Assessment   Pain Assessment Tool Pain Assessment not indicated - pt denies pain   Restrictions/Precautions   Other Precautions Contact/isolation; Airborne/isolation; Impulsive; Chair Alarm; Bed Alarm;Multiple lines;Telemetry;O2;Fall Risk;Hard of hearing  (high flow nasal cannula)   General   Chart Reviewed Yes   Family/Caregiver Present No   Cognition   Arousal/Participation Alert; Cooperative   Attention Attends with cues to redirect   Orientation Level Oriented to person;Oriented to place; Other (Comment)  (pt was identified w/ full name, birth date)   Following Commands Follows one step commands without difficulty   Subjective   Subjective pt agreed to participate in PT intervention  Activity Tolerance   Activity Tolerance Patient limited by fatigue   Nurse Made Aware spoke to Tereso Mccarty PTA, [place] PCA   Equipment Use   Comments ankle pumps 30  quad sets 10  heel slides 5  Assessment   Prognosis Good   Problem List Decreased strength;Decreased endurance; Impaired balance;Decreased mobility; Decreased safety awareness; Impaired hearing   Assessment Pt was introduced to therapeutic exercises to address mobility and physical deficits noted during eval  Pt needed rest breaks while completing exercises due to fatigue  Input was necessary to complete all reps and maintain technique  Pt needs further input to improve independence w/ completing exercise program  continued PT intervention is indicated to reduce fall risk  Goals   Patient Goals go home  do what I was doing before  STG Expiration Date 01/21/21   Short Term Goal #1 pt will:  Increase bilateral LE strength 1/2 grade to facilitate independent mobility, Perform all bed mobility tasks independently to decrease fall risk factors, Perform all transfers independently to improve independence, Ambulate 250 ft  without assistive device independently w/o LOB to facilitate return to previous level of function, Navigate 3 stairs independently with unilateral handrail to facilitate return to previous living environment, Increase ambulatory balance 1 grade to decrease risk for falls, Complete exercise program independently to increase strength and endurance, Tolerate 3 hr OOB to faciliate upright tolerance and Improve Barthel Index score to 80 or greater to facilitate independence   PT Treatment Day 1   Plan   Treatment/Interventions Functional transfer training;LE strengthening/ROM; Elevations; Therapeutic exercise; Endurance training;Patient/family training;Equipment eval/education; Bed mobility;Gait training   Progress Improving as expected   PT Frequency 5x/wk   Recommendation   PT Discharge Recommendation Return to previous environment with social support;Home with skilled therapy  (home PT)   Additional Comments Pt would benefit from OT consult to address safety awareness and energy conservation  Skilled inpatient PT recommended while in hospital to progress pt toward treatment goals      Vinny Yost, PT

## 2021-01-11 NOTE — PHYSICAL THERAPY NOTE
PHYSICAL THERAPY EVALUATION NOTE    Patient Name: Moises LUCIA Date: 1/11/2021  AGE:   78 y o  Mrn:   [id number]  ADMIT DX:  [id number] virus infection [U07 1]    Past Medical History:   Diagnosis Date    Anxiety     BPH (benign prostatic hypertrophy)     [facility]     [facility]    Colon polyp     Concussion     Resolved: 08/22/16    Depression     Gastrointestinal hemorrhage     Last assessed: 08/27/13    GERD (gastroesophageal reflux disease)     Hearing loss of aging     Hiatal hernia     History of transfusion     Hyperlipidemia     Hypertension     Iron deficiency anemia     Microscopic hematuria     Last assessed: 06/28/13    OA (osteoarthritis)     right hip    Pneumothorax     Prostatitis     Pulmonary hypertension (HCC)     Seasonal allergies     UTI (urinary tract infection)     in past    Wears glasses      Length Of Stay: 1  PHYSICAL THERAPY EVALUATION :    01/11/21 0823   PT Last Visit   PT Visit Date 01/11/21   Note Type   Note type Evaluation   Pain Assessment   Pain Assessment Tool Pain Assessment not indicated - pt denies pain   Home Living   Type of Home Apartment   Home Layout One level; Other (Comment)  ([address] w/ railing)   Additional Comments lives w/ spouse  ambulates w/o device  owns cane and walker  independent w/ ADLs and driving  no falls in last 6 months  no history of supplemental oxygen use  Prior Function   Comments pt seen supine in bed  agreed to PT eval  denied pain or dizziness  Restrictions/Precautions   Other Precautions Contact/isolation; Airborne/isolation; Impulsive; Chair Alarm; Bed Alarm;Multiple lines;Telemetry;O2;Fall Risk;Hard of hearing  (high flow nasal cannula)   General   Additional Pertinent History 1/11/21 at 4:25, WBC was 65 14 (critical high value)   1/10/21 at 10:39, blood pressure was 95/44   Family/Caregiver Present No Cognition   Arousal/Participation Alert   Orientation Level Oriented to person;Oriented to place; Other (Comment)  (pt was identified w/ full name, birth date)   Following Commands Follows one step commands without difficulty   Comments high flow nasal cannula  resting pulse ox 95% and 75 BPM, active 96% and 84 BPM  supine blood pressure 117/64  RUE Assessment   RUE Assessment WFL   LUE Assessment   LUE Assessment WFL   RLE Assessment   RLE Assessment WFL  (4-/5)   LLE Assessment   LLE Assessment WFL  (4-/5)   Coordination   Movements are Fluid and Coordinated 1   Sensation X  (impaired hearing)   Light Touch   RLE Light Touch Grossly intact   LLE Light Touch Grossly intact   Bed Mobility   Supine to Sit 4  Minimal assistance   Additional items Assist x 1;HOB elevated; Bedrails; Increased time required;Verbal cues;LE management  (for trunk/LE positioning, breathing technique)   Transfers   Sit to Stand 4  Minimal assistance   Additional items Assist x 1; Increased time required;Verbal cues  (for LE positioning)   Stand to Sit 5  Supervision   Additional items Impulsive;Verbal cues  (for body positioning, controlled descent)   Ambulation/Elevation   Gait pattern Wide HERNAN; Foward flexed; Short stride   Gait Assistance 4  Minimal assist   Additional items Assist x 1;Verbal cues  (for full step length, breathing technique)   Assistive Device None   Distance 5, 20 feet w/ seated rest break x 2 minutes  (additional not possible due to fatigue)   Stair Management Assistance Not tested  (due to limited ambulation tolerance)   Balance   Static Sitting Good   Dynamic Sitting Fair   Static Standing Fair   Ambulatory Poor +   Activity Tolerance   Activity Tolerance Patient limited by fatigue   Nurse Made Aware spoke to [address] PTA, [place] PCA   Assessment   Prognosis Good   Problem List Decreased strength;Decreased endurance; Impaired balance;Decreased mobility; Decreased safety awareness; Impaired hearing   Assessment Pt was transferred [facility] after presenting for monoclonal antibody infusion for COVID-19 but did not receive this as he was hypotensive and hypoxic  In the ED, pt was fluid resuscitated and started high-flow nasal cannula and transferred to [facility] for further evaluation  Dx: acute hypoxic respiratory failure, hypotension, hyponatremia, PHAM, chronic urinary retention, and COVID-19 infection  order placed for PT eval and tx, w/ activity order of up w/ A and ambulate patient  pt presents w/ comorbidities of concussion, leukemia, hyperlipidemia, HTN, anemia, OA, pneumothorax, pulmonary HTN, UTI, left femur fx surgery, and right THR and personal factors of stair(s) to enter home, anxiety, hearing impairments and depression  pt presents w/ weakness, decreased endurance, impaired balance, gait deviations, impaired hearing, decreased safety awareness and fall risk  these impairments are evident in findings from physical examination (weakness), mobility assessment (need for standby to min assist w/ all phases of mobility when usually mobilizing independently, tolerance to only 20 feet of ambulation and need for cueing for mobility technique), and Barthel Index: 55/100  pt needed input for task focus and mobility technique  pt is at risk for falls due to physical and safety awareness deficits  pt's clinical presentation is unstable/unpredictable (evident in critical high WBC value, poor blood pressure control, need for assist w/ all phases of mobility when usually mobilizing independently, tolerance to only 20 feet of ambulation, need for supplemental oxygen in order to maintain oxygen saturation and need for input for mobility technique/safety)  pt needs inpatient PT tx to improve mobility deficits and progress mobility training as appropriate  discharge recommendation is for home w/ family support and home PT to reduce fall risk and maximize level of functional independence   Pt would benefit from OT consult to address safety awareness and energy conservation  Goals   Patient Goals go home  do what I was doing before  STG Expiration Date 01/21/21   Short Term Goal #1 pt will: Increase bilateral LE strength 1/2 grade to facilitate independent mobility, Perform all bed mobility tasks independently to decrease fall risk factors, Perform all transfers independently to improve independence, Ambulate 250 ft  without assistive device independently w/o LOB to facilitate return to previous level of function, Navigate 3 stairs independently with unilateral handrail to facilitate return to previous living environment, Increase ambulatory balance 1 grade to decrease risk for falls, Complete exercise program independently to increase strength and endurance, Tolerate 3 hr OOB to faciliate upright tolerance and Improve Barthel Index score to 80 or greater to facilitate independence   Plan   Treatment/Interventions Functional transfer training;LE strengthening/ROM; Elevations; Therapeutic exercise; Endurance training;Patient/family training;Equipment eval/education; Bed mobility;Gait training   PT Frequency 5x/wk   Recommendation   PT Discharge Recommendation Return to previous environment with social support;Home with skilled therapy; Other (Comment)  (home PT)   Barthel Index   Feeding 10   Bathing 0   Grooming Score 5   Dressing Score 5   Bladder Score 10   Bowels Score 10   Toilet Use Score 5   Transfers (Bed/Chair) Score 10   Mobility (Level Surface) Score 0   Stairs Score 0   Barthel Index Score 55     Skilled PT recommended while in hospital and upon DC to progress pt toward treatment goals       Tony Ray, PT

## 2021-01-12 ENCOUNTER — APPOINTMENT (INPATIENT)
Dept: RADIOLOGY | Facility: HOSPITAL | Age: 71
DRG: 207 | End: 2021-01-12
Payer: MEDICARE

## 2021-01-12 ENCOUNTER — APPOINTMENT (INPATIENT)
Dept: ULTRASOUND IMAGING | Facility: HOSPITAL | Age: 71
DRG: 207 | End: 2021-01-12
Payer: MEDICARE

## 2021-01-12 LAB
ABO GROUP BLD BPU: NORMAL
ALBUMIN SERPL BCP-MCNC: 2.5 G/DL (ref 3.5–5)
ALP SERPL-CCNC: 133 U/L (ref 46–116)
ALT SERPL W P-5'-P-CCNC: 162 U/L (ref 12–78)
ANION GAP SERPL CALCULATED.3IONS-SCNC: 7 MMOL/L (ref 4–13)
ANION GAP SERPL CALCULATED.3IONS-SCNC: 9 MMOL/L (ref 4–13)
ANISOCYTOSIS BLD QL SMEAR: PRESENT
AST SERPL W P-5'-P-CCNC: 174 U/L (ref 5–45)
BASOPHILS # BLD AUTO: 0.03 THOUSANDS/ΜL (ref 0–0.1)
BASOPHILS # BLD MANUAL: 0 THOUSAND/UL (ref 0–0.1)
BASOPHILS NFR BLD AUTO: 0 % (ref 0–1)
BASOPHILS NFR MAR MANUAL: 0 % (ref 0–1)
BILIRUB SERPL-MCNC: 2.22 MG/DL (ref 0.2–1)
BPU ID: NORMAL
BUN SERPL-MCNC: 18 MG/DL (ref 5–25)
BUN SERPL-MCNC: 19 MG/DL (ref 5–25)
CALCIUM ALBUM COR SERPL-MCNC: 9.5 MG/DL (ref 8.3–10.1)
CALCIUM SERPL-MCNC: 8.3 MG/DL (ref 8.3–10.1)
CALCIUM SERPL-MCNC: 8.4 MG/DL (ref 8.3–10.1)
CHLORIDE SERPL-SCNC: 94 MMOL/L (ref 100–108)
CHLORIDE SERPL-SCNC: 97 MMOL/L (ref 100–108)
CO2 SERPL-SCNC: 24 MMOL/L (ref 21–32)
CO2 SERPL-SCNC: 25 MMOL/L (ref 21–32)
CREAT SERPL-MCNC: 0.75 MG/DL (ref 0.6–1.3)
CREAT SERPL-MCNC: 0.81 MG/DL (ref 0.6–1.3)
EOSINOPHIL # BLD AUTO: 0 THOUSAND/ΜL (ref 0–0.61)
EOSINOPHIL # BLD MANUAL: 0 THOUSAND/UL (ref 0–0.4)
EOSINOPHIL NFR BLD AUTO: 0 % (ref 0–6)
EOSINOPHIL NFR BLD MANUAL: 0 % (ref 0–6)
ERYTHROCYTE [DISTWIDTH] IN BLOOD BY AUTOMATED COUNT: 16.1 % (ref 11.6–15.1)
ERYTHROCYTE [DISTWIDTH] IN BLOOD BY AUTOMATED COUNT: 16.7 % (ref 11.6–15.1)
GFR SERPL CREATININE-BSD FRML MDRD: 91 ML/MIN/1.73SQ M
GFR SERPL CREATININE-BSD FRML MDRD: 94 ML/MIN/1.73SQ M
GLUCOSE SERPL-MCNC: 150 MG/DL (ref 65–140)
GLUCOSE SERPL-MCNC: 160 MG/DL (ref 65–140)
HCT VFR BLD AUTO: 23.9 % (ref 36.5–49.3)
HCT VFR BLD AUTO: 25.7 % (ref 36.5–49.3)
HGB BLD-MCNC: 7.5 G/DL (ref 12–17)
HGB BLD-MCNC: 7.8 G/DL (ref 12–17)
IMM GRANULOCYTES # BLD AUTO: 0.5 THOUSAND/UL (ref 0–0.2)
IMM GRANULOCYTES NFR BLD AUTO: 1 % (ref 0–2)
LYMPHOCYTES # BLD AUTO: 80 % (ref 14–44)
LYMPHOCYTES # BLD AUTO: 88.09 THOUSAND/UL (ref 0.6–4.47)
LYMPHOCYTES # BLD AUTO: 93.3 THOUSANDS/ΜL (ref 0.6–4.47)
LYMPHOCYTES NFR BLD AUTO: 88 % (ref 14–44)
MCH RBC QN AUTO: 30.6 PG (ref 26.8–34.3)
MCH RBC QN AUTO: 31.6 PG (ref 26.8–34.3)
MCHC RBC AUTO-ENTMCNC: 30.4 G/DL (ref 31.4–37.4)
MCHC RBC AUTO-ENTMCNC: 31.4 G/DL (ref 31.4–37.4)
MCV RBC AUTO: 101 FL (ref 82–98)
MCV RBC AUTO: 101 FL (ref 82–98)
MONOCYTES # BLD AUTO: 0.3 THOUSAND/ΜL (ref 0.17–1.22)
MONOCYTES # BLD AUTO: 1.1 THOUSAND/UL (ref 0–1.22)
MONOCYTES NFR BLD AUTO: 0 % (ref 4–12)
MONOCYTES NFR BLD: 1 % (ref 4–12)
NEUTROPHILS # BLD AUTO: 11.32 THOUSANDS/ΜL (ref 1.85–7.62)
NEUTROPHILS # BLD MANUAL: 20.92 THOUSAND/UL (ref 1.85–7.62)
NEUTS SEG NFR BLD AUTO: 11 % (ref 43–75)
NEUTS SEG NFR BLD AUTO: 19 % (ref 43–75)
NRBC BLD AUTO-RTO: 0 /100 WBCS
NRBC BLD AUTO-RTO: 0 /100 WBCS
PLATELET # BLD AUTO: 327 THOUSANDS/UL (ref 149–390)
PLATELET # BLD AUTO: 359 THOUSANDS/UL (ref 149–390)
PLATELET BLD QL SMEAR: ADEQUATE
PMV BLD AUTO: 9.7 FL (ref 8.9–12.7)
PMV BLD AUTO: 9.7 FL (ref 8.9–12.7)
POIKILOCYTOSIS BLD QL SMEAR: PRESENT
POTASSIUM SERPL-SCNC: 4.5 MMOL/L (ref 3.5–5.3)
POTASSIUM SERPL-SCNC: 5.1 MMOL/L (ref 3.5–5.3)
PROT SERPL-MCNC: 7 G/DL (ref 6.4–8.2)
RBC # BLD AUTO: 2.37 MILLION/UL (ref 3.88–5.62)
RBC # BLD AUTO: 2.55 MILLION/UL (ref 3.88–5.62)
RBC MORPH BLD: PRESENT
SMUDGE CELLS BLD QL SMEAR: PRESENT
SODIUM SERPL-SCNC: 127 MMOL/L (ref 136–145)
SODIUM SERPL-SCNC: 129 MMOL/L (ref 136–145)
TOTAL CELLS COUNTED SPEC: 100
UNIT DISPENSE STATUS: NORMAL
UNIT PRODUCT CODE: NORMAL
UNIT RH: NORMAL
WBC # BLD AUTO: 105.45 THOUSAND/UL (ref 4.31–10.16)
WBC # BLD AUTO: 110.11 THOUSAND/UL (ref 4.31–10.16)

## 2021-01-12 PROCEDURE — 76705 ECHO EXAM OF ABDOMEN: CPT

## 2021-01-12 PROCEDURE — 80048 BASIC METABOLIC PNL TOTAL CA: CPT | Performed by: INTERNAL MEDICINE

## 2021-01-12 PROCEDURE — 94640 AIRWAY INHALATION TREATMENT: CPT

## 2021-01-12 PROCEDURE — 74018 RADEX ABDOMEN 1 VIEW: CPT

## 2021-01-12 PROCEDURE — 94003 VENT MGMT INPAT SUBQ DAY: CPT

## 2021-01-12 PROCEDURE — 80053 COMPREHEN METABOLIC PANEL: CPT | Performed by: PHYSICIAN ASSISTANT

## 2021-01-12 PROCEDURE — 99291 CRITICAL CARE FIRST HOUR: CPT | Performed by: INTERNAL MEDICINE

## 2021-01-12 PROCEDURE — 85007 BL SMEAR W/DIFF WBC COUNT: CPT | Performed by: INTERNAL MEDICINE

## 2021-01-12 PROCEDURE — 94762 N-INVAS EAR/PLS OXIMTRY CONT: CPT

## 2021-01-12 PROCEDURE — 71045 X-RAY EXAM CHEST 1 VIEW: CPT

## 2021-01-12 PROCEDURE — 85027 COMPLETE CBC AUTOMATED: CPT | Performed by: PHYSICIAN ASSISTANT

## 2021-01-12 PROCEDURE — 85027 COMPLETE CBC AUTOMATED: CPT | Performed by: INTERNAL MEDICINE

## 2021-01-12 PROCEDURE — 94760 N-INVAS EAR/PLS OXIMETRY 1: CPT

## 2021-01-12 RX ORDER — LORAZEPAM 1 MG/1
2 TABLET ORAL ONCE
Status: COMPLETED | OUTPATIENT
Start: 2021-01-12 | End: 2021-01-12

## 2021-01-12 RX ORDER — LEVALBUTEROL 1.25 MG/.5ML
1.25 SOLUTION, CONCENTRATE RESPIRATORY (INHALATION) EVERY 8 HOURS PRN
Status: DISCONTINUED | OUTPATIENT
Start: 2021-01-12 | End: 2021-01-13

## 2021-01-12 RX ADMIN — MELATONIN 6 MG: at 21:54

## 2021-01-12 RX ADMIN — IPRATROPIUM BROMIDE 0.5 MG: 0.5 SOLUTION RESPIRATORY (INHALATION) at 09:19

## 2021-01-12 RX ADMIN — OMEGA-3 FATTY ACIDS CAP 1000 MG 1000 MG: 1000 CAP at 09:16

## 2021-01-12 RX ADMIN — THIAMINE HCL TAB 100 MG 100 MG: 100 TAB at 09:17

## 2021-01-12 RX ADMIN — FOLIC ACID 1 MG: 1 TABLET ORAL at 09:16

## 2021-01-12 RX ADMIN — ENOXAPARIN SODIUM 30 MG: 30 INJECTION SUBCUTANEOUS at 21:54

## 2021-01-12 RX ADMIN — OXYCODONE HYDROCHLORIDE AND ACETAMINOPHEN 1000 MG: 500 TABLET ORAL at 21:54

## 2021-01-12 RX ADMIN — OXYCODONE HYDROCHLORIDE AND ACETAMINOPHEN 1000 MG: 500 TABLET ORAL at 09:16

## 2021-01-12 RX ADMIN — ENOXAPARIN SODIUM 30 MG: 30 INJECTION SUBCUTANEOUS at 09:14

## 2021-01-12 RX ADMIN — ZINC SULFATE 220 MG (50 MG) CAPSULE 220 MG: CAPSULE at 09:16

## 2021-01-12 RX ADMIN — SODIUM CHLORIDE 50 ML/HR: 0.9 INJECTION, SOLUTION INTRAVENOUS at 07:31

## 2021-01-12 RX ADMIN — LORAZEPAM 2 MG: 1 TABLET ORAL at 04:19

## 2021-01-12 RX ADMIN — LORAZEPAM 2 MG: 1 TABLET ORAL at 22:04

## 2021-01-12 RX ADMIN — DEXAMETHASONE SODIUM PHOSPHATE 8.5 MG: 10 INJECTION, SOLUTION INTRAMUSCULAR; INTRAVENOUS at 21:55

## 2021-01-12 RX ADMIN — CHLORHEXIDINE GLUCONATE 0.12% ORAL RINSE 15 ML: 1.2 LIQUID ORAL at 09:18

## 2021-01-12 RX ADMIN — POLYETHYLENE GLYCOL 3350 17 G: 17 POWDER, FOR SOLUTION ORAL at 09:36

## 2021-01-12 RX ADMIN — FAMOTIDINE 20 MG: 20 TABLET ORAL at 21:54

## 2021-01-12 RX ADMIN — ALBUTEROL SULFATE 2 PUFF: 90 AEROSOL, METERED RESPIRATORY (INHALATION) at 09:10

## 2021-01-12 RX ADMIN — MONTELUKAST SODIUM 10 MG: 10 TABLET, FILM COATED ORAL at 09:14

## 2021-01-12 RX ADMIN — DOCUSATE SODIUM AND SENNOSIDES 1 TABLET: 8.6; 5 TABLET ORAL at 21:54

## 2021-01-12 RX ADMIN — ATORVASTATIN CALCIUM 40 MG: 40 TABLET, FILM COATED ORAL at 21:54

## 2021-01-12 RX ADMIN — REMDESIVIR 100 MG: 100 INJECTION, POWDER, LYOPHILIZED, FOR SOLUTION INTRAVENOUS at 17:28

## 2021-01-12 RX ADMIN — ASPIRIN 81 MG: 81 TABLET ORAL at 09:16

## 2021-01-12 RX ADMIN — TAMSULOSIN HYDROCHLORIDE 0.4 MG: 0.4 CAPSULE ORAL at 17:28

## 2021-01-12 RX ADMIN — GUAIFENESIN AND DEXTROMETHORPHAN 10 ML: 100; 10 SYRUP ORAL at 17:28

## 2021-01-12 RX ADMIN — LEVALBUTEROL HYDROCHLORIDE 1.25 MG: 1.25 SOLUTION, CONCENTRATE RESPIRATORY (INHALATION) at 09:19

## 2021-01-12 RX ADMIN — Medication 2000 UNITS: at 09:15

## 2021-01-12 RX ADMIN — DEXAMETHASONE SODIUM PHOSPHATE 8.5 MG: 10 INJECTION, SOLUTION INTRAMUSCULAR; INTRAVENOUS at 09:18

## 2021-01-12 RX ADMIN — FAMOTIDINE 20 MG: 20 TABLET ORAL at 09:17

## 2021-01-12 RX ADMIN — CHLORHEXIDINE GLUCONATE 0.12% ORAL RINSE 15 ML: 1.2 LIQUID ORAL at 21:54

## 2021-01-12 RX ADMIN — SERTRALINE HYDROCHLORIDE 50 MG: 50 TABLET ORAL at 09:16

## 2021-01-12 NOTE — PROGRESS NOTES
Daily Progress Note - [facility] Dina Willams 78 y o  male MRN: [id number]  Unit/Bed#: ICU 08 Encounter: [id number]        ----------------------------------------------------------------------------------------  HPI/24hr events: 77 y/o male admitted due to acute hypoxic respiratory failure and hypotension in the setting of COVID pneumonia  Past medical history, includes CLL, BPH with urinary retention, hypertension, pulmonary hypertension, hypercholesterolemia, depression/anxiety  Patient is anxious particularly about his wife who is on the [facility] Surg service in ICU bed 11  CBC from 4:00 a m  indicated white blood cell count of 105, repeated episodes of calling the nurses and getting mad and anxious overnight requiring 30 L FiO2 85% on high flow  Desat to 50% when jumping across the room    ---------------------------------------------------------------------------------------  SUBJECTIVE    Patient reports that he "did not get along" with the providers overnight  He says that he feels subjectively confused and that he has been coughing and would like more oxygen  Also with new RUQ pain  Nursing reports he is doing much worse than yesterday  Review of Systems   Constitutional: Positive for fatigue  Negative for chills and fever  HENT: Negative for congestion and rhinorrhea  Respiratory: Positive for cough, shortness of breath and wheezing  Cardiovascular: Negative for chest pain and palpitations  Gastrointestinal: Positive for abdominal pain  Negative for constipation, diarrhea, nausea and vomiting  Genitourinary: Negative for decreased urine volume and difficulty urinating  Skin: Negative for rash  Neurological: Positive for weakness  Negative for dizziness and tremors  Psychiatric/Behavioral: Positive for behavioral problems  Negative for hallucinations  The patient is nervous/anxious           Sujective confusion     Review of systems was reviewed and negative unless stated above in HPI/24-hour events   ---------------------------------------------------------------------------------------  Assessment and Plan:    Neuro:    Diagnosis:  Alcohol abuse  o Plan:  On CIWA protocol requiring Ativan once overnight  o Reported history of drinking 6 beers in 1 day with last drink on this Saturday  o On thiamine   Diagnosis:  Depression  o Plan:  Continue melatonin Zoloft and Ambien      CV:    Diagnosis:  Pulmonary hypertension, HTN  o Plan:  No current outpatient therapy for pulmonary hypertension  o No diureses indicated  o Echo 04/03/2019 EF 60%  o holding home antihypertensives   o Home meds: norvasc 5mg, aspirin 81mg, coreg 12 5 mg BID, cozaar 100 mg daily      Pulm:   Diagnosis:  Acute hypoxic respiratory failure in the setting of COVID-19 pneumonia  o Plan:  Continue severe COVID protocol  o Currently on HFNC decreased to 15LPM/60%  o Maintain SpO2 >90%  o Pulmonary hygiene  o Chest PT  o Self prone/OOB      GI:    Diagnosis: GERD  o Plan: continue pepsid, tolerating regular diet        :    Diagnosis: Hyponatremia  o Plan:  BMP Q 4  o Normal saline at 50 cc/hour  o Slow repletion of sodium not to exceed greater than 4 mEq/hr   Diagnosis: PHAM  o Plan: Likely in the setting of poor PO intake  o Baseline Cr: 0 85-0 87  o Admission Cr: 1 22, trending down  o Continue to trend BUN/Cr  o Strict I/O    Diagnosis: Chronic urinary retention with BPH  · Patient straight caths every Am and HS but also urinates himself throughout the day  · Continue flomax  · CT A/P: Markedly distended bladder with circumfirential wall thickening and multiple large diverticula  Correlate clinically for urinary retention and the need for urinary catheterization      F/E/N:    Plan: NS @ 50 cc/hr   Electrolytes will replete as necessary to maintain potassium > 4, magnesium >2, and phosphorus >3   Nutrition: Regular diet      Heme/Onc:    Diagnosis: CLL  o Plan: Start lovenox 30mg BID  o Follows with outpatient oncology   Diagnosis: Leukocytosis  o Plan: WBC count of 50 92 on admission with increase to 105 45 this AM   o Continue to trend blood counts  o Transfuse if Hgb <7  o Platlets 327      Endo:    Diagnosis: No acute issues  o Plan: Continue to trend glucose  o Bood glucose goal 140-180      ID:    Diagnosis: Covid PNA  o Plan: continue severe pathway  o Continue Remdesivir Day 3/5  o Continue Decadron Day 3/10  o Convalescent plasma   o Continue ceftriaxone/doxy  o Procal: 0 21  o Continue DMV  o Atorvastatin  o Pepsid  o Vit C, Vit D, Zinc        MSK/Skin:    Diagnosis: No acute issues  o Plan: Frequent turning/pressure offloading  o Local wound care as needed  o PT/OT      Disposition: Continue Critical Care   Code Status: Level 1 - Full Code  ---------------------------------------------------------------------------------------  ICU CORE MEASURES    Prophylaxis   VTE Pharmacologic Prophylaxis: Enoxaparin (Lovenox)  VTE Mechanical Prophylaxis: sequential compression device  Stress Ulcer Prophylaxis: Famotidine PO      Invasive Devices Review  Invasive Devices     Peripheral Intravenous Line            Peripheral IV 01/10/21 Left Antecubital 1 day    Peripheral IV 01/10/21 Right Antecubital 1 day                ---------------------------------------------------------------------------------------  OBJECTIVE    Vitals   Vitals:    21 0105 21 0115 21 0315 21 0354   BP:  141/81  141/64   Pulse:  77  73   Resp:  (!) 34     Temp:       TempSrc:       SpO2: 91% (!) 81% 91%      Temp (24hrs), Av 2 °F (36 8 °C), Min:97 2 °F (36 2 °C), Max:98 5 °F (36 9 °C)      Respiratory:  SpO2: SpO2: 90 %  Nasal Cannula O2 Flow Rate (L/min): 10 L/min    Invasive/non-invasive ventilation settings   Respiratory    Lab Data (Last 4 hours)    None         O2/Vent Data (Last 4 hours)       0315          Non-Invasive Ventilation Mode MFNC (Mid flow)                   Physical Exam  Constitutional: Appearance: He is ill-appearing  He is not toxic-appearing  HENT:      Head: Normocephalic and atraumatic  Right Ear: External ear normal       Left Ear: External ear normal       Nose: Nose normal       Mouth/Throat:      Mouth: Mucous membranes are moist       Pharynx: Oropharynx is clear  Eyes:      General: No scleral icterus  Right eye: No discharge  Left eye: No discharge  Extraocular Movements: Extraocular movements intact  Conjunctiva/sclera: Conjunctivae normal    Neck:      Musculoskeletal: Neck supple  Cardiovascular:      Rate and Rhythm: Normal rate and regular rhythm  Heart sounds: No murmur  No friction rub  No gallop  Pulmonary:      Breath sounds: Wheezing present  No rhonchi or rales  Abdominal:      General: Bowel sounds are normal  There is no distension  Palpations: Abdomen is soft  There is no mass  Tenderness: There is abdominal tenderness (RUQ)  There is no guarding or rebound  Musculoskeletal:      Right lower leg: No edema  Left lower leg: No edema  Skin:     General: Skin is warm and dry  Capillary Refill: Capillary refill takes less than 2 seconds  Neurological:      General: No focal deficit present  Mental Status: He is alert and oriented to person, place, and time  Cranial Nerves: No cranial nerve deficit        Comments: Patient feels subjectively confused but converses appropriately, AAOx3   Psychiatric:         Mood and Affect: Mood normal          Laboratory and Diagnostics:  Results from last 7 days   Lab Units 01/12/21  0352 01/11/21  0425 01/10/21  1250 01/10/21  0931 01/10/21  0920 01/10/21  0853   WBC Thousand/uL 105 45* 65 14*  --   --   --  50 92*   HEMOGLOBIN g/dL 7 5* 8 7*  --   --   --  9 1*   I STAT HEMOGLOBIN g/dl  --   --   --  8 2* 7 5*  --    HEMATOCRIT % 23 9* 26 9*  --   --   --  26 5*   HEMATOCRIT, ISTAT %  --   --   --  24* 22*  --    PLATELETS Thousands/uL 327 237 220  --   -- 241   NEUTROS PCT % 11*  --   --   --   --   --    BANDS PCT %  --  2  --   --   --  1   MONOS PCT % 0*  --   --   --   --   --    MONO PCT %  --  5  --   --   --  1*     Results from last 7 days   Lab Units 01/12/21  0352 01/12/21  0025 01/11/21  1546 01/11/21  0800 01/11/21  0425 01/11/21  0414 01/10/21  2238 01/10/21  1250  01/10/21  0852   SODIUM mmol/L 129* 127* 126* 127*  --  125* 124* 121*  --  122*   POTASSIUM mmol/L 4 5 5 1 4 2 4 1  --  4 1 4 2 4 4  --  4 5   CHLORIDE mmol/L 97* 94* 93* 94*  --  92* 91* 88*  --  87*   CO2 mmol/L 25 24 22 24  --  24 23 24  --  26   CO2, I-STAT   --   --   --   --   --   --   --   --    < >  --    ANION GAP mmol/L 7 9 11 9  --  9 10 9  --  9   BUN mg/dL 18 19 20 21  --  26* 26* 26*  --  28*   CREATININE mg/dL 0 75 0 81 0 88 0 78  --  0 91 1 06 1 13  --  1 22*   CALCIUM mg/dL 8 3 8 4 8 5 8 5  --  8 3 8 4 8 4  --  8 8   GLUCOSE RANDOM mg/dL 150* 160* 176* 153*  --  165* 173* 143*  --  127   ALT U/L 162*  --   --   --  73  --   --  51  --  45   AST U/L 174*  --   --   --  100*  --   --  53*  --  50*   ALK PHOS U/L 133*  --   --   --  107  --   --  116  --  103 8   ALBUMIN g/dL 2 5*  --   --   --  2 5*  --   --  2 7*  --  3 7   TOTAL BILIRUBIN mg/dL 2 22*  --   --   --  1 49*  --   --  1 97*  --  2 77*    < > = values in this interval not displayed       Results from last 7 days   Lab Units 01/11/21  0425 01/10/21  0852   MAGNESIUM mg/dL 2 0 1 9   PHOSPHORUS mg/dL 3 4  --       Results from last 7 days   Lab Units 01/10/21  0852   INR  1 18*   PTT seconds 35*      Results from last 7 days   Lab Units 01/10/21  0852   TROPONIN I ng/mL <0 03     Results from last 7 days   Lab Units 01/10/21  0852   LACTIC ACID mmol/L 0 7     ABG:    VBG:    Results from last 7 days   Lab Units 01/11/21  0425 01/10/21  1250 01/10/21  0852   PROCALCITONIN ng/ml 0 14 0 21 0 26*       Micro  Results from last 7 days   Lab Units 01/10/21  1423 01/10/21  1251 01/10/21  1012 01/10/21  0852   BLOOD CULTURE No Growth at 24 hrs  No Growth at 24 hrs  No Growth at 24 hrs  No Growth at 24 hrs  EKG:   Imaging:  I have personally reviewed pertinent reports  Intake and Output  I/O       01/10 0701 - 01/11 0700 01/11 0701 - 01/12 0700    P  O   830    I V   1151 7    Blood  200    Total Intake  2181 7    Urine 925 1700    Stool  0    Total Output 925 1700    Net -925 +481 7          Unmeasured Urine Occurrence  1 x    Unmeasured Stool Occurrence  1 x            Height and Weights         There is no height or weight on file to calculate BMI  Weight (last 2 days)     None            Nutrition       Diet Orders   (From admission, onward)             Start     Ordered    01/10/21 1624  Room Service  Once     Question:  Type of Service  Answer:  Room Service - Appropriate with Assistance    01/10/21 1623    01/10/21 1220  Diet Regular; Regular House  Diet effective now     Question Answer Comment   Diet Type Regular    Regular Regular House    RD to adjust diet per protocol?  Yes        01/10/21 1219                    Active Medications  Scheduled Meds:  Current Facility-Administered Medications   Medication Dose Route Frequency Provider Last Rate    albuterol  2 puff Inhalation Q4H PRN Claudean Acosta, CRNP      ascorbic acid  1,000 mg Oral Q12H [address] Claudean Acosta, CRNP      aspirin  81 mg Oral Daily Claudean Acosta, CRNP      atorvastatin  40 mg Oral HS Claudean Acosta, CRNP      chlorhexidine  15 mL Swish & Spit Q12H [address] Claudean Acosta, CRNP      cholecalciferol  2,000 Units Oral Daily MATHEW Antunez      dexamethasone  0 1 mg/kg Intravenous Q12H Claudean Acosta, CRNP      dextromethorphan-guaiFENesin  10 mL Oral Q4H PRN Francheska Terry PA-C      enoxaparin  30 mg Subcutaneous Q12H [address] Claudean Acosta, CRNP      famotidine  20 mg Oral BID Mathew Olea MD      fish oil  1,000 mg Oral Daily KoltoneaMATHEW Tom      fluticasone  2 spray Nasal Daily PRN , CRNP      folic acid  1 mg Oral Daily Mohan Hoskins MD      glycerin-hypromellose-  1 drop Both Eyes Q3H PRN Lauren Krause MD      melatonin  6 mg Oral HS , CRNP      montelukast  10 mg Oral Daily , CRNP      zinc sulfate  220 mg Oral Daily , CRNP      Followed by   Jocelyn Hall ON 1/17/2021] multivitamin-minerals  1 tablet Oral Daily , CRNP      polyethylene glycol  17 g Oral Daily Bianca Johnson PA-C      remdesivir  100 mg Intravenous Q24H , MATHEW      senna-docusate sodium  1 tablet Oral HS Bianca Johnson PA-C      sertraline  50 mg Oral Daily , MATHEW      sodium chloride  50 mL/hr Intravenous Continuous Mohan Hoskins MD 50 mL/hr (01/11/21 1702)    tamsulosin  0 4 mg Oral Daily With [employer], MATHEW      thiamine  100 mg Oral Daily Mohan Hoskins MD      zolpidem  10 mg Oral HS Bianca Johnson PA-C       Continuous Infusions:  sodium chloride, 50 mL/hr, Last Rate: 50 mL/hr (01/11/21 1702)      PRN Meds:   albuterol, 2 puff, Q4H PRN  dextromethorphan-guaiFENesin, 10 mL, Q4H PRN  fluticasone, 2 spray, Daily PRN  glycerin-hypromellose-, 1 drop, Q3H PRN        Allergies   Allergies   Allergen Reactions    Codeine Other (See Comments)     agitated    Sulfamethoxazole-Trimethoprim GI Intolerance and Abdominal Pain     upset stomach     ---------------------------------------------------------------------------------------  Advance Directive and Living Will: Yes    Power of :    POLST:    ---------------------------------------------------------------------------------------  Care Time Delivered:       Clinton Fuentes DO      Portions of the record may have been created with voice recognition software    Occasional wrong word or "sound a like" substitutions may have occurred due to the inherent limitations of voice recognition software    Read the chart carefully and recognize, using context, where substitutions have occurred

## 2021-01-12 NOTE — QUICK NOTE
Discussed with the patient's son Ac Tom was called the with an update  We discussed his continued oxygen requirement on high flow oxygen 40L at 85% FiO2 as well as increased leucocytosis and inflammatory markers and worsened chest xray  All questions were answered and update was appreciated

## 2021-01-13 ENCOUNTER — APPOINTMENT (INPATIENT)
Dept: RADIOLOGY | Facility: HOSPITAL | Age: 71
DRG: 207 | End: 2021-01-13
Payer: MEDICARE

## 2021-01-13 PROBLEM — M48.061 SPINAL STENOSIS OF LUMBAR REGION: Chronic | Status: ACTIVE | Noted: 2017-05-15

## 2021-01-13 PROBLEM — N13.8 BPH WITH OBSTRUCTION/LOWER URINARY TRACT SYMPTOMS: Chronic | Status: ACTIVE | Noted: 2020-01-28

## 2021-01-13 PROBLEM — M50.30 DDD (DEGENERATIVE DISC DISEASE), CERVICAL: Chronic | Status: ACTIVE | Noted: 2018-08-15

## 2021-01-13 PROBLEM — J96.01 ACUTE RESPIRATORY FAILURE WITH HYPOXIA (HCC): Status: ACTIVE | Noted: 2021-01-13

## 2021-01-13 PROBLEM — E87.1 HYPONATREMIA: Status: RESOLVED | Noted: 2020-03-06 | Resolved: 2021-01-13

## 2021-01-13 PROBLEM — R94.31 QT PROLONGATION: Status: ACTIVE | Noted: 2021-01-13

## 2021-01-13 PROBLEM — M16.11 PRIMARY OSTEOARTHRITIS OF RIGHT HIP: Chronic | Status: ACTIVE | Noted: 2017-09-29

## 2021-01-13 PROBLEM — F10.10 ALCOHOL ABUSE: Chronic | Status: ACTIVE | Noted: 2020-03-06

## 2021-01-13 PROBLEM — D50.9 IRON DEFICIENCY ANEMIA: Chronic | Status: ACTIVE | Noted: 2017-08-24

## 2021-01-13 PROBLEM — K57.92 DIVERTICULITIS: Chronic | Status: ACTIVE | Noted: 2020-01-13

## 2021-01-13 PROBLEM — Z12.5 PROSTATE CANCER SCREENING: Status: RESOLVED | Noted: 2020-01-28 | Resolved: 2021-01-13

## 2021-01-13 PROBLEM — I27.20 PULMONARY HYPERTENSION (HCC): Chronic | Status: ACTIVE | Noted: 2017-05-15

## 2021-01-13 PROBLEM — G47.33 OSA (OBSTRUCTIVE SLEEP APNEA): Chronic | Status: ACTIVE | Noted: 2018-10-17

## 2021-01-13 PROBLEM — F32.A MILD DEPRESSION: Chronic | Status: ACTIVE | Noted: 2019-08-30

## 2021-01-13 PROBLEM — E78.00 HYPERCHOLESTEROLEMIA: Chronic | Status: ACTIVE | Noted: 2019-08-30

## 2021-01-13 PROBLEM — R33.9 URINARY RETENTION: Chronic | Status: ACTIVE | Noted: 2020-03-06

## 2021-01-13 PROBLEM — N40.1 BPH WITH OBSTRUCTION/LOWER URINARY TRACT SYMPTOMS: Chronic | Status: ACTIVE | Noted: 2020-01-28

## 2021-01-13 LAB
ALBUMIN SERPL BCP-MCNC: 2.2 G/DL (ref 3.5–5)
ALBUMIN SERPL BCP-MCNC: 2.3 G/DL (ref 3.5–5)
ALP SERPL-CCNC: 132 U/L (ref 46–116)
ALP SERPL-CCNC: 137 U/L (ref 46–116)
ALT SERPL W P-5'-P-CCNC: 124 U/L (ref 12–78)
ALT SERPL W P-5'-P-CCNC: 134 U/L (ref 12–78)
ANION GAP SERPL CALCULATED.3IONS-SCNC: 5 MMOL/L (ref 4–13)
ANION GAP SERPL CALCULATED.3IONS-SCNC: 6 MMOL/L (ref 4–13)
ANION GAP SERPL CALCULATED.3IONS-SCNC: 7 MMOL/L (ref 4–13)
ANISOCYTOSIS BLD QL SMEAR: PRESENT
ARTERIAL PATENCY WRIST A: YES
AST SERPL W P-5'-P-CCNC: 103 U/L (ref 5–45)
AST SERPL W P-5'-P-CCNC: 85 U/L (ref 5–45)
BASE EXCESS BLDA CALC-SCNC: -0.7 MMOL/L
BASOPHILS # BLD MANUAL: 0 THOUSAND/UL (ref 0–0.1)
BASOPHILS NFR MAR MANUAL: 0 % (ref 0–1)
BILIRUB SERPL-MCNC: 2.27 MG/DL (ref 0.2–1)
BILIRUB SERPL-MCNC: 2.85 MG/DL (ref 0.2–1)
BUN SERPL-MCNC: 20 MG/DL (ref 5–25)
BUN SERPL-MCNC: 22 MG/DL (ref 5–25)
BUN SERPL-MCNC: 23 MG/DL (ref 5–25)
CALCIUM ALBUM COR SERPL-MCNC: 9 MG/DL (ref 8.3–10.1)
CALCIUM ALBUM COR SERPL-MCNC: 9.4 MG/DL (ref 8.3–10.1)
CALCIUM SERPL-MCNC: 7.6 MG/DL (ref 8.3–10.1)
CALCIUM SERPL-MCNC: 7.7 MG/DL (ref 8.3–10.1)
CALCIUM SERPL-MCNC: 8 MG/DL (ref 8.3–10.1)
CHLORIDE SERPL-SCNC: 101 MMOL/L (ref 100–108)
CHLORIDE SERPL-SCNC: 102 MMOL/L (ref 100–108)
CHLORIDE SERPL-SCNC: 99 MMOL/L (ref 100–108)
CO2 SERPL-SCNC: 25 MMOL/L (ref 21–32)
CO2 SERPL-SCNC: 26 MMOL/L (ref 21–32)
CO2 SERPL-SCNC: 27 MMOL/L (ref 21–32)
CREAT SERPL-MCNC: 0.71 MG/DL (ref 0.6–1.3)
CREAT SERPL-MCNC: 0.72 MG/DL (ref 0.6–1.3)
CREAT SERPL-MCNC: 0.77 MG/DL (ref 0.6–1.3)
CRP SERPL QL: 187.7 MG/L
CRP SERPL QL: <3 MG/L
D DIMER PPP FEU-MCNC: 16.39 UG/ML FEU
D DIMER PPP FEU-MCNC: >20 UG/ML FEU
EOSINOPHIL # BLD MANUAL: 0 THOUSAND/UL (ref 0–0.4)
EOSINOPHIL NFR BLD MANUAL: 0 % (ref 0–6)
ERYTHROCYTE [DISTWIDTH] IN BLOOD BY AUTOMATED COUNT: 17.2 % (ref 11.6–15.1)
ERYTHROCYTE [DISTWIDTH] IN BLOOD BY AUTOMATED COUNT: 17.6 % (ref 11.6–15.1)
FERRITIN SERPL-MCNC: 2320 NG/ML (ref 8–388)
FERRITIN SERPL-MCNC: 2430 NG/ML (ref 8–388)
GFR SERPL CREATININE-BSD FRML MDRD: 93 ML/MIN/1.73SQ M
GFR SERPL CREATININE-BSD FRML MDRD: 96 ML/MIN/1.73SQ M
GFR SERPL CREATININE-BSD FRML MDRD: 96 ML/MIN/1.73SQ M
GLUCOSE SERPL-MCNC: 118 MG/DL (ref 65–140)
GLUCOSE SERPL-MCNC: 120 MG/DL (ref 65–140)
GLUCOSE SERPL-MCNC: 159 MG/DL (ref 65–140)
HCO3 BLDA-SCNC: 24.8 MMOL/L (ref 22–28)
HCT VFR BLD AUTO: 19.3 % (ref 36.5–49.3)
HCT VFR BLD AUTO: 21.9 % (ref 36.5–49.3)
HGB BLD-MCNC: 5.8 G/DL (ref 12–17)
HGB BLD-MCNC: 6.8 G/DL (ref 12–17)
HOROWITZ INDEX BLDA+IHG-RTO: 100 MM[HG]
LYMPHOCYTES # BLD AUTO: 72.02 THOUSAND/UL (ref 0.6–4.47)
LYMPHOCYTES # BLD AUTO: 85 % (ref 14–44)
MAGNESIUM SERPL-MCNC: 2 MG/DL (ref 1.6–2.6)
MCH RBC QN AUTO: 31.2 PG (ref 26.8–34.3)
MCH RBC QN AUTO: 32.1 PG (ref 26.8–34.3)
MCHC RBC AUTO-ENTMCNC: 30.1 G/DL (ref 31.4–37.4)
MCHC RBC AUTO-ENTMCNC: 31.1 G/DL (ref 31.4–37.4)
MCV RBC AUTO: 103 FL (ref 82–98)
MCV RBC AUTO: 104 FL (ref 82–98)
MONOCYTES # BLD AUTO: 0.85 THOUSAND/UL (ref 0–1.22)
MONOCYTES NFR BLD: 1 % (ref 4–12)
NEUTROPHILS # BLD MANUAL: 11.86 THOUSAND/UL (ref 1.85–7.62)
NEUTS SEG NFR BLD AUTO: 14 % (ref 43–75)
NRBC BLD AUTO-RTO: 0 /100 WBCS
O2 CT BLDA-SCNC: 9.2 ML/DL (ref 16–23)
OXYHGB MFR BLDA: 91.8 % (ref 94–97)
PCO2 BLDA: 45.8 MM HG (ref 36–44)
PEEP RESPIRATORY: 8 CM[H2O]
PH BLDA: 7.35 [PH] (ref 7.35–7.45)
PLATELET # BLD AUTO: 293 THOUSANDS/UL (ref 149–390)
PLATELET # BLD AUTO: 318 THOUSANDS/UL (ref 149–390)
PLATELET BLD QL SMEAR: ADEQUATE
PMV BLD AUTO: 9.5 FL (ref 8.9–12.7)
PMV BLD AUTO: 9.6 FL (ref 8.9–12.7)
PO2 BLDA: 75 MM HG (ref 75–129)
POTASSIUM SERPL-SCNC: 4.4 MMOL/L (ref 3.5–5.3)
POTASSIUM SERPL-SCNC: 4.8 MMOL/L (ref 3.5–5.3)
POTASSIUM SERPL-SCNC: 4.9 MMOL/L (ref 3.5–5.3)
PROCALCITONIN SERPL-MCNC: 0.12 NG/ML
PROT SERPL-MCNC: 6.3 G/DL (ref 6.4–8.2)
PROT SERPL-MCNC: 6.7 G/DL (ref 6.4–8.2)
RBC # BLD AUTO: 1.86 MILLION/UL (ref 3.88–5.62)
RBC # BLD AUTO: 2.12 MILLION/UL (ref 3.88–5.62)
RBC MORPH BLD: PRESENT
SMUDGE CELLS BLD QL SMEAR: PRESENT
SODIUM SERPL-SCNC: 132 MMOL/L (ref 136–145)
SODIUM SERPL-SCNC: 133 MMOL/L (ref 136–145)
SODIUM SERPL-SCNC: 133 MMOL/L (ref 136–145)
SPECIMEN SOURCE: ABNORMAL
TOTAL CELLS COUNTED SPEC: 100
VENT AC: 16
VENT- AC: AC
VT SETTING VENT: 450 ML
WBC # BLD AUTO: 70.15 THOUSAND/UL (ref 4.31–10.16)
WBC # BLD AUTO: 84.73 THOUSAND/UL (ref 4.31–10.16)

## 2021-01-13 PROCEDURE — 82728 ASSAY OF FERRITIN: CPT | Performed by: INTERNAL MEDICINE

## 2021-01-13 PROCEDURE — 85379 FIBRIN DEGRADATION QUANT: CPT | Performed by: INTERNAL MEDICINE

## 2021-01-13 PROCEDURE — 30233N1 TRANSFUSION OF NONAUTOLOGOUS RED BLOOD CELLS INTO PERIPHERAL VEIN, PERCUTANEOUS APPROACH: ICD-10-PCS | Performed by: INTERNAL MEDICINE

## 2021-01-13 PROCEDURE — 31500 INSERT EMERGENCY AIRWAY: CPT | Performed by: PHYSICIAN ASSISTANT

## 2021-01-13 PROCEDURE — 36600 WITHDRAWAL OF ARTERIAL BLOOD: CPT

## 2021-01-13 PROCEDURE — 31500 INSERT EMERGENCY AIRWAY: CPT

## 2021-01-13 PROCEDURE — 85379 FIBRIN DEGRADATION QUANT: CPT | Performed by: NURSE PRACTITIONER

## 2021-01-13 PROCEDURE — 85027 COMPLETE CBC AUTOMATED: CPT | Performed by: INTERNAL MEDICINE

## 2021-01-13 PROCEDURE — 87070 CULTURE OTHR SPECIMN AEROBIC: CPT | Performed by: PHYSICIAN ASSISTANT

## 2021-01-13 PROCEDURE — 82728 ASSAY OF FERRITIN: CPT | Performed by: NURSE PRACTITIONER

## 2021-01-13 PROCEDURE — P9016 RBC LEUKOCYTES REDUCED: HCPCS

## 2021-01-13 PROCEDURE — 80053 COMPREHEN METABOLIC PANEL: CPT | Performed by: INTERNAL MEDICINE

## 2021-01-13 PROCEDURE — 99291 CRITICAL CARE FIRST HOUR: CPT | Performed by: PHYSICIAN ASSISTANT

## 2021-01-13 PROCEDURE — 0BH18EZ INSERTION OF ENDOTRACHEAL AIRWAY INTO TRACHEA, VIA NATURAL OR ARTIFICIAL OPENING ENDOSCOPIC: ICD-10-PCS | Performed by: INTERNAL MEDICINE

## 2021-01-13 PROCEDURE — 80053 COMPREHEN METABOLIC PANEL: CPT | Performed by: NURSE PRACTITIONER

## 2021-01-13 PROCEDURE — 83735 ASSAY OF MAGNESIUM: CPT | Performed by: NURSE PRACTITIONER

## 2021-01-13 PROCEDURE — 85610 PROTHROMBIN TIME: CPT | Performed by: INTERNAL MEDICINE

## 2021-01-13 PROCEDURE — 94002 VENT MGMT INPAT INIT DAY: CPT

## 2021-01-13 PROCEDURE — 85014 HEMATOCRIT: CPT | Performed by: NURSE PRACTITIONER

## 2021-01-13 PROCEDURE — 84145 PROCALCITONIN (PCT): CPT | Performed by: FAMILY MEDICINE

## 2021-01-13 PROCEDURE — 87205 SMEAR GRAM STAIN: CPT | Performed by: PHYSICIAN ASSISTANT

## 2021-01-13 PROCEDURE — 94640 AIRWAY INHALATION TREATMENT: CPT

## 2021-01-13 PROCEDURE — 5A1955Z RESPIRATORY VENTILATION, GREATER THAN 96 CONSECUTIVE HOURS: ICD-10-PCS | Performed by: INTERNAL MEDICINE

## 2021-01-13 PROCEDURE — 84484 ASSAY OF TROPONIN QUANT: CPT | Performed by: INTERNAL MEDICINE

## 2021-01-13 PROCEDURE — 94150 VITAL CAPACITY TEST: CPT

## 2021-01-13 PROCEDURE — 82805 BLOOD GASES W/O2 SATURATION: CPT | Performed by: PHYSICIAN ASSISTANT

## 2021-01-13 PROCEDURE — 86140 C-REACTIVE PROTEIN: CPT | Performed by: NURSE PRACTITIONER

## 2021-01-13 PROCEDURE — XW033H5 INTRODUCTION OF TOCILIZUMAB INTO PERIPHERAL VEIN, PERCUTANEOUS APPROACH, NEW TECHNOLOGY GROUP 5: ICD-10-PCS | Performed by: INTERNAL MEDICINE

## 2021-01-13 PROCEDURE — 85027 COMPLETE CBC AUTOMATED: CPT | Performed by: NURSE PRACTITIONER

## 2021-01-13 PROCEDURE — NC001 PR NO CHARGE: Performed by: PHYSICIAN ASSISTANT

## 2021-01-13 PROCEDURE — 85018 HEMOGLOBIN: CPT | Performed by: NURSE PRACTITIONER

## 2021-01-13 PROCEDURE — 93005 ELECTROCARDIOGRAM TRACING: CPT

## 2021-01-13 PROCEDURE — 86140 C-REACTIVE PROTEIN: CPT | Performed by: INTERNAL MEDICINE

## 2021-01-13 PROCEDURE — 80048 BASIC METABOLIC PNL TOTAL CA: CPT | Performed by: INTERNAL MEDICINE

## 2021-01-13 PROCEDURE — 85007 BL SMEAR W/DIFF WBC COUNT: CPT | Performed by: INTERNAL MEDICINE

## 2021-01-13 PROCEDURE — 94760 N-INVAS EAR/PLS OXIMETRY 1: CPT

## 2021-01-13 PROCEDURE — 99292 CRITICAL CARE ADDL 30 MIN: CPT | Performed by: INTERNAL MEDICINE

## 2021-01-13 PROCEDURE — 94003 VENT MGMT INPAT SUBQ DAY: CPT

## 2021-01-13 PROCEDURE — 71045 X-RAY EXAM CHEST 1 VIEW: CPT

## 2021-01-13 RX ORDER — LEVALBUTEROL 1.25 MG/.5ML
1.25 SOLUTION, CONCENTRATE RESPIRATORY (INHALATION)
Status: DISCONTINUED | OUTPATIENT
Start: 2021-01-13 | End: 2021-01-17

## 2021-01-13 RX ORDER — FENTANYL CITRATE 50 UG/ML
INJECTION, SOLUTION INTRAMUSCULAR; INTRAVENOUS
Status: DISPENSED
Start: 2021-01-13 | End: 2021-01-13

## 2021-01-13 RX ORDER — DEXMEDETOMIDINE 100 UG/ML
.1-.7 INJECTION, SOLUTION, CONCENTRATE INTRAVENOUS
Status: DISCONTINUED | OUTPATIENT
Start: 2021-01-13 | End: 2021-01-13

## 2021-01-13 RX ORDER — MIDAZOLAM HYDROCHLORIDE 2 MG/2ML
2 INJECTION, SOLUTION INTRAMUSCULAR; INTRAVENOUS ONCE
Status: COMPLETED | OUTPATIENT
Start: 2021-01-13 | End: 2021-01-13

## 2021-01-13 RX ORDER — FAMOTIDINE 40 MG/5ML
20 POWDER, FOR SUSPENSION ORAL 2 TIMES DAILY
Status: DISCONTINUED | OUTPATIENT
Start: 2021-01-13 | End: 2021-01-18

## 2021-01-13 RX ORDER — FENTANYL CITRATE 50 UG/ML
50 INJECTION, SOLUTION INTRAMUSCULAR; INTRAVENOUS ONCE
Status: COMPLETED | OUTPATIENT
Start: 2021-01-13 | End: 2021-01-13

## 2021-01-13 RX ORDER — VECURONIUM BROMIDE 1 MG/ML
10 INJECTION, POWDER, LYOPHILIZED, FOR SOLUTION INTRAVENOUS ONCE
Status: DISCONTINUED | OUTPATIENT
Start: 2021-01-13 | End: 2021-01-13

## 2021-01-13 RX ORDER — SODIUM CHLORIDE, SODIUM GLUCONATE, SODIUM ACETATE, POTASSIUM CHLORIDE, MAGNESIUM CHLORIDE, SODIUM PHOSPHATE, DIBASIC, AND POTASSIUM PHOSPHATE .53; .5; .37; .037; .03; .012; .00082 G/100ML; G/100ML; G/100ML; G/100ML; G/100ML; G/100ML; G/100ML
500 INJECTION, SOLUTION INTRAVENOUS ONCE
Status: COMPLETED | OUTPATIENT
Start: 2021-01-13 | End: 2021-01-13

## 2021-01-13 RX ORDER — LORAZEPAM 2 MG/ML
4 INJECTION INTRAMUSCULAR ONCE
Status: COMPLETED | OUTPATIENT
Start: 2021-01-13 | End: 2021-01-13

## 2021-01-13 RX ORDER — MELATONIN
2000 DAILY
Status: DISCONTINUED | OUTPATIENT
Start: 2021-01-13 | End: 2021-01-21 | Stop reason: HOSPADM

## 2021-01-13 RX ORDER — BISACODYL 10 MG
10 SUPPOSITORY, RECTAL RECTAL DAILY PRN
Status: DISCONTINUED | OUTPATIENT
Start: 2021-01-13 | End: 2021-01-20

## 2021-01-13 RX ORDER — FENTANYL CITRATE 50 UG/ML
50 INJECTION, SOLUTION INTRAMUSCULAR; INTRAVENOUS
Status: DISCONTINUED | OUTPATIENT
Start: 2021-01-13 | End: 2021-01-18

## 2021-01-13 RX ORDER — MIDAZOLAM HYDROCHLORIDE 2 MG/2ML
INJECTION, SOLUTION INTRAMUSCULAR; INTRAVENOUS
Status: COMPLETED
Start: 2021-01-13 | End: 2021-01-13

## 2021-01-13 RX ORDER — CHLORHEXIDINE GLUCONATE 0.12 MG/ML
15 RINSE ORAL EVERY 12 HOURS SCHEDULED
Status: DISCONTINUED | OUTPATIENT
Start: 2021-01-13 | End: 2021-01-13 | Stop reason: SDUPTHER

## 2021-01-13 RX ORDER — ETOMIDATE 2 MG/ML
20 INJECTION INTRAVENOUS ONCE
Status: COMPLETED | OUTPATIENT
Start: 2021-01-13 | End: 2021-01-13

## 2021-01-13 RX ORDER — ATORVASTATIN CALCIUM 40 MG/1
40 TABLET, FILM COATED ORAL
Status: DISCONTINUED | OUTPATIENT
Start: 2021-01-13 | End: 2021-01-21 | Stop reason: HOSPADM

## 2021-01-13 RX ORDER — THIAMINE MONONITRATE (VIT B1) 100 MG
100 TABLET ORAL DAILY
Status: DISCONTINUED | OUTPATIENT
Start: 2021-01-13 | End: 2021-01-21 | Stop reason: HOSPADM

## 2021-01-13 RX ORDER — PROPOFOL 10 MG/ML
5-50 INJECTION, EMULSION INTRAVENOUS
Status: DISCONTINUED | OUTPATIENT
Start: 2021-01-13 | End: 2021-01-16

## 2021-01-13 RX ORDER — ASCORBIC ACID 500 MG
1000 TABLET ORAL EVERY 12 HOURS SCHEDULED
Status: COMPLETED | OUTPATIENT
Start: 2021-01-13 | End: 2021-01-16

## 2021-01-13 RX ORDER — LORAZEPAM 2 MG/ML
INJECTION INTRAMUSCULAR
Status: DISPENSED
Start: 2021-01-13 | End: 2021-01-13

## 2021-01-13 RX ORDER — SODIUM CHLORIDE 30 MG/ML INHALATION SOLUTION 30 MG/ML
4 SOLUTION INHALANT
Status: DISCONTINUED | OUTPATIENT
Start: 2021-01-13 | End: 2021-01-17

## 2021-01-13 RX ORDER — VECURONIUM BROMIDE 1 MG/ML
8 INJECTION, POWDER, LYOPHILIZED, FOR SOLUTION INTRAVENOUS ONCE
Status: COMPLETED | OUTPATIENT
Start: 2021-01-13 | End: 2021-01-13

## 2021-01-13 RX ADMIN — PROPOFOL 50 MCG/KG/MIN: 10 INJECTION, EMULSION INTRAVENOUS at 13:41

## 2021-01-13 RX ADMIN — MIDAZOLAM 2 MG: 1 INJECTION INTRAMUSCULAR; INTRAVENOUS at 17:32

## 2021-01-13 RX ADMIN — POLYETHYLENE GLYCOL 3350 17 G: 17 POWDER, FOR SOLUTION ORAL at 08:00

## 2021-01-13 RX ADMIN — ATORVASTATIN CALCIUM 40 MG: 40 TABLET, FILM COATED ORAL at 21:04

## 2021-01-13 RX ADMIN — VANCOMYCIN HYDROCHLORIDE 1500 MG: 5 INJECTION, POWDER, LYOPHILIZED, FOR SOLUTION INTRAVENOUS at 17:15

## 2021-01-13 RX ADMIN — MIDAZOLAM HYDROCHLORIDE 2 MG: 2 INJECTION, SOLUTION INTRAMUSCULAR; INTRAVENOUS at 17:32

## 2021-01-13 RX ADMIN — DEXAMETHASONE SODIUM PHOSPHATE 8.5 MG: 10 INJECTION, SOLUTION INTRAMUSCULAR; INTRAVENOUS at 08:04

## 2021-01-13 RX ADMIN — LEVALBUTEROL HYDROCHLORIDE 1.25 MG: 1.25 SOLUTION, CONCENTRATE RESPIRATORY (INHALATION) at 13:59

## 2021-01-13 RX ADMIN — ETOMIDATE 20 MG: 2 INJECTION, SOLUTION INTRAVENOUS at 02:30

## 2021-01-13 RX ADMIN — THIAMINE HCL TAB 100 MG 100 MG: 100 TAB at 10:07

## 2021-01-13 RX ADMIN — FAMOTIDINE 20 MG: 20 TABLET ORAL at 08:01

## 2021-01-13 RX ADMIN — PROPOFOL 50 MCG/KG/MIN: 10 INJECTION, EMULSION INTRAVENOUS at 09:43

## 2021-01-13 RX ADMIN — SODIUM CHLORIDE SOLN NEBU 3% 4 ML: 3 NEBU SOLN at 07:14

## 2021-01-13 RX ADMIN — SERTRALINE HYDROCHLORIDE 50 MG: 50 TABLET ORAL at 08:01

## 2021-01-13 RX ADMIN — REMDESIVIR 100 MG: 100 INJECTION, POWDER, LYOPHILIZED, FOR SOLUTION INTRAVENOUS at 15:50

## 2021-01-13 RX ADMIN — DOCUSATE SODIUM AND SENNOSIDES 1 TABLET: 8.6; 5 TABLET ORAL at 21:02

## 2021-01-13 RX ADMIN — FENTANYL CITRATE 50 MCG: 50 INJECTION INTRAMUSCULAR; INTRAVENOUS at 21:05

## 2021-01-13 RX ADMIN — CEFEPIME HYDROCHLORIDE 1000 MG: 1 INJECTION, POWDER, FOR SOLUTION INTRAMUSCULAR; INTRAVENOUS at 04:00

## 2021-01-13 RX ADMIN — PROPOFOL 50 MCG/KG/MIN: 10 INJECTION, EMULSION INTRAVENOUS at 22:05

## 2021-01-13 RX ADMIN — IPRATROPIUM BROMIDE 0.5 MG: 0.5 SOLUTION RESPIRATORY (INHALATION) at 01:21

## 2021-01-13 RX ADMIN — SODIUM CHLORIDE 0.5 MCG/KG/HR: 9 INJECTION, SOLUTION INTRAVENOUS at 05:40

## 2021-01-13 RX ADMIN — FENTANYL CITRATE 50 MCG: 50 INJECTION INTRAMUSCULAR; INTRAVENOUS at 13:38

## 2021-01-13 RX ADMIN — CHLORHEXIDINE GLUCONATE 0.12% ORAL RINSE 15 ML: 1.2 LIQUID ORAL at 21:01

## 2021-01-13 RX ADMIN — OXYCODONE HYDROCHLORIDE AND ACETAMINOPHEN 1000 MG: 500 TABLET ORAL at 21:01

## 2021-01-13 RX ADMIN — ZINC SULFATE 220 MG (50 MG) CAPSULE 220 MG: CAPSULE at 08:01

## 2021-01-13 RX ADMIN — PROPOFOL 50 MCG/KG/MIN: 10 INJECTION, EMULSION INTRAVENOUS at 20:45

## 2021-01-13 RX ADMIN — ENOXAPARIN SODIUM 80 MG: 80 INJECTION SUBCUTANEOUS at 21:02

## 2021-01-13 RX ADMIN — PROPOFOL 45 MCG/KG/MIN: 10 INJECTION, EMULSION INTRAVENOUS at 05:35

## 2021-01-13 RX ADMIN — LEVALBUTEROL HYDROCHLORIDE 1.25 MG: 1.25 SOLUTION, CONCENTRATE RESPIRATORY (INHALATION) at 19:16

## 2021-01-13 RX ADMIN — VANCOMYCIN HYDROCHLORIDE 1500 MG: 5 INJECTION, POWDER, LYOPHILIZED, FOR SOLUTION INTRAVENOUS at 05:18

## 2021-01-13 RX ADMIN — PROPOFOL 20 MCG/KG/MIN: 10 INJECTION, EMULSION INTRAVENOUS at 02:45

## 2021-01-13 RX ADMIN — FAMOTIDINE 20 MG: 40 POWDER, FOR SUSPENSION ORAL at 17:15

## 2021-01-13 RX ADMIN — LORAZEPAM 4 MG: 2 INJECTION INTRAMUSCULAR; INTRAVENOUS at 00:38

## 2021-01-13 RX ADMIN — SODIUM CHLORIDE SOLN NEBU 3% 4 ML: 3 NEBU SOLN at 19:16

## 2021-01-13 RX ADMIN — LEVALBUTEROL HYDROCHLORIDE 1.25 MG: 1.25 SOLUTION, CONCENTRATE RESPIRATORY (INHALATION) at 07:14

## 2021-01-13 RX ADMIN — SODIUM CHLORIDE 0.4 MCG/KG/HR: 9 INJECTION, SOLUTION INTRAVENOUS at 01:02

## 2021-01-13 RX ADMIN — SODIUM CHLORIDE SOLN NEBU 3% 4 ML: 3 NEBU SOLN at 13:59

## 2021-01-13 RX ADMIN — FOLIC ACID 1 MG: 1 TABLET ORAL at 08:01

## 2021-01-13 RX ADMIN — CEFEPIME HYDROCHLORIDE 1000 MG: 1 INJECTION, POWDER, FOR SOLUTION INTRAMUSCULAR; INTRAVENOUS at 15:07

## 2021-01-13 RX ADMIN — TOCILIZUMAB 600 MG: 20 INJECTION, SOLUTION, CONCENTRATE INTRAVENOUS at 20:30

## 2021-01-13 RX ADMIN — SODIUM CHLORIDE, SODIUM GLUCONATE, SODIUM ACETATE, POTASSIUM CHLORIDE, MAGNESIUM CHLORIDE, SODIUM PHOSPHATE, DIBASIC, AND POTASSIUM PHOSPHATE 500 ML: .53; .5; .37; .037; .03; .012; .00082 INJECTION, SOLUTION INTRAVENOUS at 02:46

## 2021-01-13 RX ADMIN — DEXAMETHASONE SODIUM PHOSPHATE 8.5 MG: 10 INJECTION, SOLUTION INTRAMUSCULAR; INTRAVENOUS at 21:04

## 2021-01-13 RX ADMIN — ENOXAPARIN SODIUM 80 MG: 80 INJECTION SUBCUTANEOUS at 10:07

## 2021-01-13 RX ADMIN — Medication 2000 UNITS: at 10:07

## 2021-01-13 RX ADMIN — FAMOTIDINE 20 MG: 40 POWDER, FOR SUSPENSION ORAL at 14:07

## 2021-01-13 RX ADMIN — CHLORHEXIDINE GLUCONATE 0.12% ORAL RINSE 15 ML: 1.2 LIQUID ORAL at 08:00

## 2021-01-13 RX ADMIN — LEVALBUTEROL HYDROCHLORIDE 1.25 MG: 1.25 SOLUTION, CONCENTRATE RESPIRATORY (INHALATION) at 01:21

## 2021-01-13 RX ADMIN — FENTANYL CITRATE 50 MCG: 50 INJECTION INTRAMUSCULAR; INTRAVENOUS at 17:22

## 2021-01-13 RX ADMIN — MONTELUKAST SODIUM 10 MG: 10 TABLET, FILM COATED ORAL at 10:07

## 2021-01-13 RX ADMIN — ASPIRIN 81 MG: 81 TABLET ORAL at 08:01

## 2021-01-13 RX ADMIN — NOREPINEPHRINE BITARTRATE 2 MCG/MIN: 1 INJECTION INTRAVENOUS at 05:42

## 2021-01-13 RX ADMIN — FENTANYL CITRATE 50 MCG: 50 INJECTION INTRAMUSCULAR; INTRAVENOUS at 22:11

## 2021-01-13 RX ADMIN — MELATONIN 6 MG: at 21:01

## 2021-01-13 RX ADMIN — OXYCODONE HYDROCHLORIDE AND ACETAMINOPHEN 1000 MG: 500 TABLET ORAL at 10:07

## 2021-01-13 RX ADMIN — VECURONIUM BROMIDE 8 MG: 1 INJECTION, POWDER, LYOPHILIZED, FOR SOLUTION INTRAVENOUS at 02:30

## 2021-01-13 NOTE — PROCEDURES
Intubation    Date/Time: 1/13/2021 2:20 AM  Performed by: Bishop Villagomez PA-C  Authorized by: Bishop Villagomez PA-C     Patient location:  Bedside  Other Assisting Provider: No    Consent:     Consent obtained:  Emergent situation  Universal protocol:     Relevant documents present and verified: yes      Test results available and properly labeled: yes      Radiology Images displayed and confirmed  If images not available, report reviewed: yes      Required blood products, implants, devices, and special equipment available: yes      Patient identity confirmed:  Hospital-assigned identification number and arm band  Pre-procedure details:     Patient status:  Altered mental status    Mallampati score:  1    Pretreatment medications:  Etomidate  Indications:     Indications for intubation: respiratory failure and hypoxemia    Procedure details:     Preoxygenation:  Bag valve mask    Intubation method:  Oral    Oral intubation technique:  Glidescope    Laryngoscope blade: Mac 3    Tube size (mm):  8 0    Number of attempts:  1    Cricoid pressure: no      Tube visualized through cords: yes    Placement assessment:     Tube secured with:  ETT saleh    Breath sounds:  Equal    Placement verification: chest rise, CXR verification, equal breath sounds, ETCO2 detector and tube exhalation      CXR findings:  ETT in proper place  Post-procedure details:     Patient tolerance of procedure: Tolerated well, no immediate complications    Complication (if applicable): Other than below  Comments:      Initially intubated patient with 7 5 ETT, successful intubation  Upon patient manipulation / repositioning, ETT dislodged prior to secured  Patient reintubated with 8 0 ETT successfully with glidescope  Initial attempt patient only received etomidate  After intubated 2nd time, unable to ventilate via BVM, Fentanyl and Vec administered

## 2021-01-13 NOTE — PROGRESS NOTES
Pt becoming increasingly agitated, anxious unable to follow commands  Reoriented pt to situation, assessed CIWA score 15 and administered 4mg IV ativan as per protocol  Upon leaving pts room he began to yell for help he then removed HFNC, nonrebreather and was climbing out of bed  Pt began to decompensate, CC at bedside

## 2021-01-13 NOTE — PROGRESS NOTES
Vancomycin Assessment    Gillian Munguia is a 78 y o  male who is currently receiving vancomycin 1500mg (20mg/kg) Q 12hrs for Pneumonia     Relevant clinical data and objective history reviewed:  Creatinine   Date Value Ref Range Status   01/12/2021 0 75 0 60 - 1 30 mg/dL Final     Comment:     Standardized to IDMS reference method   01/12/2021 0 81 0 60 - 1 30 mg/dL Final     Comment:     Standardized to IDMS reference method   01/11/2021 0 88 0 60 - 1 30 mg/dL Final     Comment:     Standardized to IDMS reference method   07/20/2015 0 86 0 60 - 1 30 mg/dL Final     Comment:     Standardized to IDMS reference method   01/28/2015 0 80 0 60 - 1 30 mg/dL Final     Comment:     Standardized to IDMS reference method   01/20/2015 0 78 0 60 - 1 30 mg/dL Final     Comment:     Standardized to IDMS reference method     BP (!) 86/50   Pulse 64   Temp 97 9 °F (36 6 °C) (Axillary)   Resp 17   SpO2 96%   I/O last 3 completed shifts: In: 2830 8 [P O :830; I V :1550 8; Blood:200; IV Piggyback:250]  Out: 2000 [Urine:2000]  Lab Results   Component Value Date/Time    BUN 18 01/12/2021 03:52 AM    BUN 11 07/20/2015 08:49 AM     11 (HH) 01/12/2021 04:09 PM    WBC 22 68 (H) 07/20/2015 08:49 AM    HGB 7 8 (L) 01/12/2021 04:09 PM    HGB 10 7 (L) 07/20/2015 08:49 AM    HCT 25 7 (L) 01/12/2021 04:09 PM    HCT 33 0 (L) 07/20/2015 08:49 AM     (H) 01/12/2021 04:09 PM    MCV 80 (L) 07/20/2015 08:49 AM     01/12/2021 04:09 PM     07/20/2015 08:49 AM     Temp Readings from Last 3 Encounters:   01/12/21 97 9 °F (36 6 °C) (Axillary)   01/10/21 97 8 °F (36 6 °C)   11/10/20 97 6 °F (36 4 °C) (Skin)     Vancomycin Days of Therapy: 1    Assessment/Plan  The patient is currently on vancomycin utilizing scheduled dosing based on adjusted body weight (due to obesity)  Baseline risks associated with therapy include: concomitant nephrotoxic medications and advanced age    The patient is currently receiving 1500mg (20mg/kg) Q 12hrs and is clinically appropriate and dose will be continued  Pharmacy will also follow closely for s/sx of nephrotoxicity, infusion reactions, and appropriateness of therapy  BMP and CBC will be ordered per protocol  Plan for trough as patient approaches steady state, prior to the 4th  dose at approximately 1630 onn 1/14/21  Due to infection severity, will target a trough of 15-20 (appropriate for most indications)   Pharmacy will continue to follow the patients culture results and clinical progress daily      Darya Kerns, Pharmacist

## 2021-01-13 NOTE — QUICK NOTE
Spoke to pt son Suleiman Ward and update at 1400 and answered all questions  Pt wife is a patient on the 3rd floor and was unable to reach her until this evening  She was updated on his condition and answered all questions

## 2021-01-13 NOTE — PROGRESS NOTES
Daily Progress Note - [facility] Dina Willams 78 y o  male MRN: [id number]  Unit/Bed#: ICU 08 Encounter: [id number]        ----------------------------------------------------------------------------------------  HPI/24hr events: 77 y/o male admitted due to acute hypoxic respiratory failure and hypotension in the setting of COVID pneumonia  Past medical history, includes CLL, BPH with urinary retention, hypertension, pulmonary hypertension, hypercholesterolemia, depression/anxiety  Worsening acute hypoxic respiratory failure overnight exacerbated by anxiety fighting with high flow attempted presidex with decompensation to the 60s on HF the with need to transition from HFNC to emergent intubation  Initial 7 5 ET tube which dislodged following settings adjustments by respiratory therapy and was intubated again with an 8 0 ET tube  Given atomidate  Later paralyzed with 8 of Vec  For decompensating pressures was given 500 cc isolyte   Currently requiring Levo to maintain pressures following intubation          ---------------------------------------------------------------------------------------  SUBJECTIVE  Unable to obtain due to patient mental status    Review of Systems  Review of systems was unable to be performed secondary to sedation  ---------------------------------------------------------------------------------------  Assessment and Plan:    Neuro:    Diagnosis: Pharmacologic encephalopathy  o Plan: Propofol 20 mcg/kg/min titrating up by 5 every 5 minutes to achieve RASS of -2 to -3   Diagnosis: Alcohol abuse   o Plan: Reports drinking "enough" as many as 6 beers/ day with last drink LAST Saturday on my questioning of the patient   o On thiamin and folate  o On presidex at 0 3 due to alcohol history will consider discontinuing today      CV:    Diagnosis: Hypotension  o Plan: Continue on Levo at this time, no fluids due to precipitous lung status on chest Xray yesterday   Diagnosis: Pulmonary hypertension, HTN  o Plan: No diuresis at this time  o Echo 4/3/2019 EF 60%  o Holding home antihypertensives  o Home meds norvasc, ASA, Coreg, Cozaar      Pulm:   Diagnosis: Acute hypoxic respiratory failure in the setting of CoVID-19 pneumonia  o Plan: Increase to very severe COVID protocol  o Currently on ventilltor - AC at 16/440/80/8 from HFNC yesterday  o SpO2 goal > 90%  o Antibiotics have been d/c  o Continue ramdesivir, decadron  o S/p convalescent plasma (1/11)  o Continue multivitamins, Atorvastatin  o q8h xopenex  o D-dimer up to 16 from 1 increase lovenox to therapeutic dose 1 mg/kg  o Recheck Procal - hold off on antibiotics at this time  o Consider early proning      GI:    Diagnosis: Last BM 1/11  o Plan: NPO with intubation status  o Jevity 1 2 ekaterina continuous 20   Diagnosis: GERD  o Plan: home on PO pepsid  o Start IV protonix      :    Diagnosis: Hyponatremia  o Plan: Continued improvement BMP q4 no fluids at this time  o Will evaluate AM Na   Diagnosis: PHAM  o Plan: Likely in the setting of poor PO intake  o Baseline Cr 0 85-0 87  o Admission Cr: 1 22, trending down  o Continue to trend BUN/Cr  o Strict I/O          F/E/N:    Plan: No fluids or diuresis at this time   Electrolytes will replete as necessary   NPO      Heme/Onc:    Diagnosis: CLL  o Plan: lovenox 30mg BID follows with outpatient oncology    Diagnosis: Leukocytosis  o Plan: WBC count of 50 92 on admission with increase to 105 45 yesterday AM  o Continue to trend blood counts Transfuse if Hgb <7   o Platlets 327      Endo:    Diagnosis:   o Plan: continue to trend glucose, goal 140-180        ID:    Diagnosis: COVID PNA severe pathway  o Plan: Remdesivir day 4/5  o Decadron day 4/5  o Convalescent plasma 1/11  o Off of ceftriaxone and doxy will repeat procalcitonin  o Cefepime and Vanc started overnight Day 1  o Last Procal 0 21  o Continue DMV  o Atorvastatin  o Pepsid  o Vit C, Vit D, and Zinc       MSK/Skin:  Diagnosis: No acute issues   o Plan: frequent turning/pressure offloading  o Local wound care as needed      Disposition: Continue Critical Care   Code Status: Level 1 - Full Code  ---------------------------------------------------------------------------------------  ICU CORE MEASURES    Prophylaxis   VTE Pharmacologic Prophylaxis: Enoxaparin (Lovenox)  VTE Mechanical Prophylaxis: sequential compression device  Stress Ulcer Prophylaxis: Famotidine IV     ABCDE Protocol (if indicated)  Plan to perform spontaneous awakening trial today? No  Plan to perform spontaneous breathing trial today? No  Obvious barriers to extubation? yes  CAM-ICU:     Invasive Devices Review  Invasive Devices     Peripheral Intravenous Line            Peripheral IV 01/10/21 Right Antecubital 2 days    Peripheral IV 21 Left Forearm 1 day    Peripheral IV 21 Dorsal (posterior); Right Hand less than 1 day          Drain            NG/OG/Enteral Tube Orogastric 18 Fr Right mouth less than 1 day          Airway            ETT  Oral;Hi-Lo; Cuffed 8 mm less than 1 day              Can any invasive devices be discontinued today?  No  ---------------------------------------------------------------------------------------  OBJECTIVE    Vitals   Vitals:    21 0415 21 0430 21 0445 21 0500   BP: (!) 86/54 (!) 86/52 (!) 86/52 (!) 86/50   BP Location:       Pulse: 64 65 64 64   Resp:  17   Temp:       TempSrc:       SpO2: 91% 91% 95% 97%     Temp (24hrs), Av 4 °F (36 3 °C), Min:97 2 °F (36 2 °C), Max:97 9 °F (36 6 °C)  Current: Temperature: 97 9 °F (36 6 °C)      Respiratory:  SpO2: SpO2: 96 %, SpO2 Device: O2 Device: Ventilator  Nasal Cannula O2 Flow Rate (L/min): 10 L/min    Invasive/non-invasive ventilation settings   Respiratory    Lab Data (Last 4 hours)       0347            pH, Arterial       7 352           pCO2, Arterial       45 8           pO2, Arterial       75 0           HCO3, Arterial 24 8           Base Excess, Arterial       -0 7                O2/Vent Data       01/13 0300   Most Recent         Vent Mode   AC/VC      Resp Rate (BPM) (BPM)   16      Vt (mL) (mL)   450      FIO2 (%) (%)   100      PEEP (cmH2O) (cmH2O) 8  Comment: increased by CC provider  8  Comment: increased by CC provider      MV   7 7                  Physical Exam  Constitutional:       General: He is in acute distress  Appearance: He is ill-appearing and toxic-appearing  He is not diaphoretic  HENT:      Head: Normocephalic and atraumatic  Right Ear: External ear normal       Left Ear: External ear normal       Nose: Nose normal       Mouth/Throat:      Mouth: Mucous membranes are moist       Pharynx: Oropharynx is clear  Comments: Blood tinged secretions  Eyes:      General: No scleral icterus  Right eye: No discharge  Left eye: No discharge  Conjunctiva/sclera: Conjunctivae normal       Comments: Poor pupillary constriction   Neck:      Musculoskeletal: Normal range of motion  Cardiovascular:      Rate and Rhythm: Normal rate and regular rhythm  Heart sounds: No murmur  No friction rub  No gallop  Pulmonary:      Effort: Pulmonary effort is normal       Breath sounds: Wheezing present  Comments: intubated  Abdominal:      General: Bowel sounds are normal       Palpations: Abdomen is soft  Tenderness: There is no abdominal tenderness  There is no guarding or rebound  Genitourinary:     Penis: Normal        Comments: Urinary catheter in place  Musculoskeletal:      Right lower leg: No edema  Left lower leg: No edema  Skin:     General: Skin is warm and dry  Capillary Refill: Capillary refill takes less than 2 seconds  Findings: No bruising or erythema     Neurological:      Comments: Unable to evaluate         Laboratory and Diagnostics:  Results from last 7 days   Lab Units 01/12/21  1609 01/12/21  0352 01/11/21  0425 01/10/21  1250 01/10/21  0931 01/10/21  0920 01/10/21  0853   WBC Thousand/uL 110 11* 105 45* 65 14*  --   --   --  50 92*   HEMOGLOBIN g/dL 7 8* 7 5* 8 7*  --   --   --  9 1*   I STAT HEMOGLOBIN g/dl  --   --   --   --  8 2* 7 5*  --    HEMATOCRIT % 25 7* 23 9* 26 9*  --   --   --  26 5*   HEMATOCRIT, ISTAT %  --   --   --   --  24* 22*  --    PLATELETS Thousands/uL 359 327 237 220  --   --  241   NEUTROS PCT %  --  11*  --   --   --   --   --    BANDS PCT %  --   --  2  --   --   --  1   MONOS PCT %  --  0*  --   --   --   --   --    MONO PCT % 1*  --  5  --   --   --  1*     Results from last 7 days   Lab Units 01/12/21  0352 01/12/21  0025 01/11/21  1546 01/11/21  0800 01/11/21  0425 01/11/21  0414 01/10/21  2238 01/10/21  1250  01/10/21  0852   SODIUM mmol/L 129* 127* 126* 127*  --  125* 124* 121*  --  122*   POTASSIUM mmol/L 4 5 5 1 4 2 4 1  --  4 1 4 2 4 4  --  4 5   CHLORIDE mmol/L 97* 94* 93* 94*  --  92* 91* 88*  --  87*   CO2 mmol/L 25 24 22 24  --  24 23 24  --  26   CO2, I-STAT   --   --   --   --   --   --   --   --    < >  --    ANION GAP mmol/L 7 9 11 9  --  9 10 9  --  9   BUN mg/dL 18 19 20 21  --  26* 26* 26*  --  28*   CREATININE mg/dL 0 75 0 81 0 88 0 78  --  0 91 1 06 1 13  --  1 22*   CALCIUM mg/dL 8 3 8 4 8 5 8 5  --  8 3 8 4 8 4  --  8 8   GLUCOSE RANDOM mg/dL 150* 160* 176* 153*  --  165* 173* 143*  --  127   ALT U/L 162*  --   --   --  73  --   --  51  --  45   AST U/L 174*  --   --   --  100*  --   --  53*  --  50*   ALK PHOS U/L 133*  --   --   --  107  --   --  116  --  103 8   ALBUMIN g/dL 2 5*  --   --   --  2 5*  --   --  2 7*  --  3 7   TOTAL BILIRUBIN mg/dL 2 22*  --   --   --  1 49*  --   --  1 97*  --  2 77*    < > = values in this interval not displayed       Results from last 7 days   Lab Units 01/11/21  0425 01/10/21  0852   MAGNESIUM mg/dL 2 0 1 9   PHOSPHORUS mg/dL 3 4  --       Results from last 7 days   Lab Units 01/10/21  0852   INR  1 18*   PTT seconds 35*      Results from last 7 days Lab Units 01/10/21  0852   TROPONIN I ng/mL <0 03     Results from last 7 days   Lab Units 01/10/21  0852   LACTIC ACID mmol/L 0 7     ABG:  Results from last 7 days   Lab Units 01/13/21  0347   PH ART  7 352   PCO2 ART mm Hg 45 8*   PO2 ART mm Hg 75 0   HCO3 ART mmol/L 24 8   BASE EXC ART mmol/L -0 7   ABG SOURCE  Radial, Right     VBG:  Results from last 7 days   Lab Units 01/13/21  0347   ABG SOURCE  Radial, Right     Results from last 7 days   Lab Units 01/11/21  0425 01/10/21  1250 01/10/21  0852   PROCALCITONIN ng/ml 0 14 0 21 0 26*       Micro  Results from last 7 days   Lab Units 01/10/21  1423 01/10/21  1251 01/10/21  1012 01/10/21  0852   BLOOD CULTURE  No Growth at 48 hrs  No Growth at 48 hrs  No Growth at 48 hrs  No Growth at 48 hrs  EKG:   Imaging:  I have personally reviewed pertinent reports  Intake and Output  I/O       01/11 0701 - 01/12 0700 01/12 0701 - 01/13 0700    P  O  830     I V  1251 7 799 2    Blood 200     IV Piggyback  500    Total Intake 2281 7 1299 2    Urine 1700 1250    Stool 0     Total Output 1700 1250    Net +581 7 +49 2          Unmeasured Urine Occurrence 1 x     Unmeasured Stool Occurrence 1 x             Height and Weights         There is no height or weight on file to calculate BMI  Weight (last 2 days)     None            Nutrition       Diet Orders   (From admission, onward)             Start     Ordered    01/10/21 1624  Room Service  Once     Question:  Type of Service  Answer:  Room Service - Appropriate with Assistance    01/10/21 1623    01/10/21 1220  Diet Regular; Regular House  Diet effective now     Question Answer Comment   Diet Type Regular    Regular Regular House    RD to adjust diet per protocol?  Yes        01/10/21 1219                    Active Medications  Scheduled Meds:  Current Facility-Administered Medications   Medication Dose Route Frequency Provider Last Rate    albuterol  2 puff Inhalation Q4H PRN MATHEW Morillo      ascorbic acid  1,000 mg Per NG Tube Q12H [facility] JESUSITA Pierson-C      aspirin  81 mg Oral Daily MATHEW Ragsdale      atorvastatin  40 mg Per NG Tube HS Mercy Frank PA-C      cefepime  1,000 mg Intravenous Q12H Mercy Gojamie, PA-C 1,000 mg (01/13/21 0400)    chlorhexidine  15 mL [facility] Kendal Tyree [place]      cholecalciferol  2,000 Units Per NG Tube Daily Helene Marie, PA-C      dexamethasone  0 1 mg/kg Intravenous Q12H MATHEW Ragsdale      dexmedetomidine  0 1-0 7 mcg/kg/hr Intravenous Titrated Helene Gojamie, PA-C 0 7 mcg/kg/hr (01/13/21 0133)    dextromethorphan-guaiFENesin  10 mL Oral Q4H PRN JESUSITA Garcia-C      enoxaparin  30 mg Subcutaneous Q12H [facility] MATHEW Ragsdale      etomidate  20 mg Intravenous Once Helene Marie PA-C      famotidine  20 mg Oral BID Edil Bhatt MD      fentanyl citrate (PF)           fluticasone  2 spray Nasal Daily PRN MATHEW Ragsdale      folic acid  1 mg Oral Daily Elise Garcia MD      glycerin-hypromellose-  1 drop Both Eyes Q3H PRN Edil Bhatt MD      levalbuterol  1 25 mg Nebulization TID Helene Marie PA-C      melatonin  6 mg Oral HS MATHEW Ragsdale      montelukast  10 mg Oral Daily Amauryter Meghna, MATHEW      zinc sulfate  220 mg Oral Daily Amauryter Meghna, MATHEW      Followed by   Pola Quiles ON 1/17/2021] multivitamin-minerals  1 tablet Oral Daily MATHEW Ragsdale      norepinephrine  1-30 mcg/min Intravenous Titrated Helene Gojamie, PA-C      polyethylene glycol  17 g Oral Daily JESUSITA Blank-MARKOS      propofol  5-50 mcg/kg/min Intravenous Titrated Elisy Gobble, PA-C 45 mcg/kg/min (01/13/21 0535)    remdesivir  100 mg Intravenous Q24H CHONG RagsdaleNP 0 mg (01/12/21 0800)    senna-docusate sodium  1 tablet Oral HS Apurva Sinclair PA-C      sertraline  50 mg Per NG Tube Daily Champ Flirt, PA-C      sodium chloride  4 mL Nebulization TID Champ Flirt, PA-C      thiamine  100 mg Per NG Tube Daily Champ Flirt, PA-C      vancomycin  20 mg/kg (Adjusted) Intravenous Q12H Champ Flirt, PA-C 1,500 mg (01/13/21 0518)    vecuronium  8 mg Intravenous Once Champ Flirt, PA-C       Continuous Infusions:  dexmedetomidine, 0 1-0 7 mcg/kg/hr, Last Rate: 0 7 mcg/kg/hr (01/13/21 0133)  norepinephrine, 1-30 mcg/min  propofol, 5-50 mcg/kg/min, Last Rate: 45 mcg/kg/min (01/13/21 0535)      PRN Meds:   albuterol, 2 puff, Q4H PRN  dextromethorphan-guaiFENesin, 10 mL, Q4H PRN  fluticasone, 2 spray, Daily PRN  glycerin-hypromellose-, 1 drop, Q3H PRN        Allergies   Allergies   Allergen Reactions    Codeine Other (See Comments)     agitated    Sulfamethoxazole-Trimethoprim GI Intolerance and Abdominal Pain     upset stomach     ---------------------------------------------------------------------------------------  Advance Directive and Living Will: Yes    Power of :    POLST:    ---------------------------------------------------------------------------------------  Care Time Delivered:       Margarette Thomas DO      Portions of the record may have been created with voice recognition software  Occasional wrong word or "sound a like" substitutions may have occurred due to the inherent limitations of voice recognition software    Read the chart carefully and recognize, using context, where substitutions have occurred

## 2021-01-13 NOTE — CONSULTS
Consult received for TF recommendations  Pt receiving about 671 kcal daily from propofol rate  Recommend goal TF of Jevity 1 2 @ 40 ml/hr with additional 2 packets of Prosource TF daily  Including calories from propofol, will provide 1903 kcal, 75g protein , 778 ml free water  Additional free water flushes per primary team, hyponatremia noted  Will continue to closely follow

## 2021-01-13 NOTE — QUICK NOTE
Contacted patients son Karey Aaron and updated on his dad's further decline ultimately requiring intubation as well as MV support  We further discussed his Mom / Murphy's wife whom is also admitted currently on MS3 with COVID  He would like us to hold off on updating his mother at this time  He will call us back this am after he speaks with his sister regarding his dads decline

## 2021-01-13 NOTE — PHYSICAL THERAPY NOTE
Physical Therapy Cancellation Note      Attempted to see pt for PT intervention but Kimberley Schofield reports pt was intubated last night and is not appropriate for PT due to respiratory and overall medical status  Will follow and continue PT as appropriate      Jakub Ogden, PT

## 2021-01-13 NOTE — CONSULTS
Oncology Consult Note      Name: Dev Willson  : 1950  Age: 78 y o  Sex: male  MRN: [id number]  Unit/Bed#: ICU 08  Encounter: [id number]      Assessment   1  COVID-19 pneumonia  The patient presented with increasing shortness of breath and in acute hypoxic respiratory failure  He has been initiated on protocol for COVID 19 infection  He is currently on remdesivir, dexamethasone on broad-spectrum antibiotic therapy with cefepime and vancomycin for suspected concurrent bacterial infection  2  Acute respiratory failure with hypoxemia  The patient is currently intubated on ventilatory support  3  Leukocytosis  The patient has a known diagnosis of chronic lymphocytic leukemia (CLL) in   The disease is CD5, CD 23 positive, kappa light chain positive, CD 30 8-and Zap 70 positive  He has been followed by Dr Qi Loyd of [address] [place] [address] Oncology  4  Anemia  The patient has a history of occult GI blood loss from gastric angiodysplasia  He has been supplemented with IV iron therapy in order to maintain adequate iron stores  The anemia is due in part to the acute infection        Patient Active Problem List   Diagnosis    Essential hypertension    CLL (chronic lymphocytic leukemia) ([address] )    Primary osteoarthritis of right hip    Allergic rhinitis due to pollen    Erectile dysfunction of non-organic origin    Esophageal reflux    Pulmonary hypertension ([address] )    Spinal stenosis of lumbar region    Enlarged prostate without lower urinary tract symptoms (luts)    Iron deficiency anemia    Insomnia    Arthritis of right hip    Abnormal EKG    DDD (degenerative disc disease), cervical    DIXIE not currently treated    Hypercholesterolemia    Mild depression ([address] )    History of colon polyps    Diverticulitis    Splenomegaly    BPH with obstruction/lower urinary tract symptoms    Urinary retention    Alcohol abuse    Hyperglycemia    COVID-19    Acute respiratory failure with hypoxia ([facility] )       Plan:  The patient's white blood cell count should decrease as his infection is treated  No specific intervention is indicated at this time for his known chronic lymphocytic leukemia (CLL)  Reason for consultation:  Patient with chronic lymphocytic leukemia presenting with COVID-19 pneumonia  History of Presenting Illness:  The patient is a 62-year-old male with multiple medical problems including a history of hypertension, hyperlipidemia, gastroesophageal reflux disease (GERD), alcohol abuse being followed by Dr Madelyn Ashton of [place] [address] Oncology for CLL  The patient's disease has been indolent and he has not been initiated on active treatment of his disease  The patient also has a history of iron-deficiency anemia due to occult GI bleeding as a result of gastric angiodysplastic lesions  The patient presented complaining of increasing shortness of breath  He was initially going to be treated on an outpatient but was found to be in acute respiratory failure with hypoxemia  The chest x-ray done on presentation showed ground-glass opacities throughout the right lung and left mid to lower lung zone consistent with COVID-19 pneumonia  The patient subsequently tested positive for SARS-CoV-2  The patient appeared to be going into alcohol withdrawal before becoming increasingly agitated and hypoxemic prompting intubation and initiation of ventilatory support  The patient is on the protocol for COVID-19 infection and suspected superimposed bacterial infection        Past Medical History:   Diagnosis Date    Anxiety     BPH (benign prostatic hypertrophy)     CLL (chronic lymphocytic leukemia) ([facility] )     2009    Colon polyp     Concussion     Resolved: 08/22/16    Depression     Gastrointestinal hemorrhage     Last assessed: 08/27/13    GERD (gastroesophageal reflux disease)     Hearing loss of aging     Hiatal hernia     History of transfusion     Hyperlipidemia     Hypertension     Iron deficiency anemia     Microscopic hematuria     Last assessed: 13    OA (osteoarthritis)     right hip    Pneumothorax     Prostatitis     Pulmonary hypertension (HCC)     Seasonal allergies     UTI (urinary tract infection)     in past    Wears glasses        Past Surgical History:   Procedure Laterality Date    COLONOSCOPY      CYSTOSCOPY  10/09/2014    Diagnostic    CYSTOSCOPY  2020    FRACTURE SURGERY      left lower arm    FRACTURE SURGERY      left femur    HERNIA REPAIR      JOINT REPLACEMENT Right     hip    OTHER SURGICAL HISTORY  2011    Spinal anesthesia epidural    VA TOTAL HIP ARTHROPLASTY Right 2017    Procedure: ARTHROPLASTY HIP TOTAL ANTERIOR;  Surgeon: Isidoro Perez MD;  Location: [facility];  Service: Orthopedics    TONSILLECTOMY AND ADENOIDECTOMY      TRANSURETHRAL RESECTION OF PROSTATE      x 2    WRIST SURGERY         Social History     Socioeconomic History    Marital status: /Civil Union     Spouse name: Not on file    Number of children: Not on file    Years of education: Not on file    Highest education level: Not on file   Occupational History    Not on file   Social Needs    Financial resource strain: Not on file    Food insecurity     Worry: Not on file     Inability: Not on file   [employer] needs     Medical: Not on file     Non-medical: Not on file   Tobacco Use    Smoking status: Former Smoker     Packs/day: 1 00     Years: 15 00     Pack years: 15 00     Types: Cigarettes     Start date:      Quit date: 1980     Years since quittin 3    Smokeless tobacco: Never Used   Substance and Sexual Activity    Alcohol use:  Yes     Alcohol/week: 10 0 standard drinks     Types: 10 Cans of beer per week     Frequency: 4 or more times a week     Drinks per session: 3 or 4    Drug use: Not Currently     Types: Marijuana    Sexual activity: Not Currently   Lifestyle    Physical activity     Days per week: Not on file     Minutes per session: Not on file    Stress: Not on file   Relationships    Social connections     Talks on phone: Not on file     Gets together: Not on file     Attends Cheondo[employer] service: Not on file     Active member of club or organization: Not on file     Attends meetings of clubs or organizations: Not on file     Relationship status: Not on file    Intimate partner violence     Fear of current or ex partner: Not on file     Emotionally abused: Not on file     Physically abused: Not on file     Forced sexual activity: Not on file   Other Topics Concern    Not on file   Social History Narrative    Not on file       Family History   Problem Relation Age of Onset    No Known Problems Mother     Heart attack Father     Diabetes Brother     Prostate cancer Brother          Current Facility-Administered Medications:     albuterol (PROVENTIL HFA,VENTOLIN HFA) inhaler 2 puff, 2 puff, Inhalation, Q4H PRN, [facility], CRNP, 2 puff at 01/12/21 0910    ascorbic acid (VITAMIN C) tablet 1,000 mg, 1,000 mg, Per NG Tube, Q12H [address], Rodriguez Osorio PA-C, 1,000 mg at 01/13/21 1007    aspirin (ECOTRIN LOW STRENGTH) EC tablet 81 mg, 81 mg, Oral, Daily, [facility], CRNP, 81 mg at 01/13/21 0801    atorvastatin (LIPITOR) tablet 40 mg, 40 mg, Per NG Tube, HS, Rodriguez Osorio PA-C    cefepime (MAXIPIME) 1,000 mg in dextrose 5 % 50 mL IVPB, 1,000 mg, Intravenous, Q12H, Rodriguez Osorio PA-C, Stopped at 01/13/21 1601    chlorhexidine (PERIDEX) 0 12 % oral rinse 15 mL, 15 mL, Swish & Spit, Q12H [address], MATHEW Sotelo, 15 mL at 01/13/21 0800    cholecalciferol (VITAMIN D3) tablet 2,000 Units, 2,000 Units, Per NG Tube, Daily, Rodriguez Osorio PA-C, 2,000 Units at 01/13/21 1007    dexamethasone (PF) (DECADRON) injection 8 5 mg, 0 1 mg/kg, Intravenous, Q12H, MATHEW Sotelo, 8 5 mg at 01/13/21 0804    dextromethorphan-guaiFENesin (ROBITUSSIN DM) oral syrup 10 mL, 10 mL, Oral, Q4H PRN, Gabo Vanegas PA-C, 10 mL at 01/12/21 1728    enoxaparin (LOVENOX) subcutaneous injection 80 mg, 1 mg/kg, Subcutaneous, Q12H [facility], Julianne Jordan, DO, 80 mg at 01/13/21 1007    famotidine (PEPCID) oral suspension 20 mg, 20 mg, Per NG Tube, BID, MATHEW Dawson, 20 mg at 01/13/21 1715    fentanyl citrate (PF) 100 MCG/2ML **ADS Override Pull**, , , ,     fentanyl citrate (PF) 100 MCG/2ML 50 mcg, 50 mcg, Intravenous, Q1H PRN, MATHEW Dawson, 50 mcg at 01/13/21 1722    fluticasone (FLONASE) 50 mcg/act nasal spray 2 spray, 2 spray, Nasal, Daily PRN, MATHEW [place]    folic acid (FOLVITE) tablet 1 mg, 1 mg, Oral, Daily, Temitope Sesay MD, 1 mg at 01/13/21 0801    glycerin-hypromellose- (ARTIFICIAL TEARS) ophthalmic solution 1 drop, 1 drop, Both Eyes, Q3H PRN, Lauren Krause MD    [facility]) inhalation solution 1 25 mg, 1 25 mg, Nebulization, TID, Carmina Ervin PA-C, 1 25 mg at 01/13/21 1359    melatonin tablet 6 mg, 6 mg, Oral, HS, MATHEW Sotelo, 6 mg at 01/12/21 2154    montelukast (SINGULAIR) tablet 10 mg, 10 mg, Oral, Daily, Cheng MATHEW Snyder, 10 mg at 01/13/21 1007    zinc sulfate (ZINCATE) capsule 220 mg, 220 mg, Oral, Daily, 220 mg at 01/13/21 0801 **FOLLOWED BY** [START ON 1/17/2021] multivitamin-minerals (CENTRUM ADULTS) tablet 1 tablet, 1 tablet, Oral, Daily, MATHEW [place]    polyethylene glycol (MIRALAX) packet 17 g, 17 g, Oral, Daily, Bianca Johnson PA-C, 17 g at 01/13/21 0800    propofol (DIPRIVAN) 1000 mg in 100 mL infusion (premix), 5-50 mcg/kg/min, Intravenous, Titrated, Carmina Ervin PA-C, Last Rate: 25 4 mL/hr at 01/13/21 1341, 50 mcg/kg/min at 01/13/21 1341    [COMPLETED] remdesivir (Veklury) 200 mg in sodium chloride 0 9 % 250 mL IVPB, 200 mg, Intravenous, Q24H, Stopped at 01/10/21 2154 **FOLLOWED BY** remdesivir (Veklury) 100 mg in sodium chloride 0 9 % 250 mL IVPB, 100 mg, Intravenous, Q24H, MATHEW Sotelo, Last Rate: 0 mL/hr at 01/12/21 0800, 100 mg at 01/13/21 1550    senna-docusate sodium (SENOKOT S) 8 6-50 mg per tablet 1 tablet, 1 tablet, Oral, HS, Trinity Houser PA-C, 1 tablet at 01/12/21 2154    sertraline (ZOLOFT) tablet 50 mg, 50 mg, Per NG Tube, Daily, Luiza Real PA-C, 50 mg at 01/13/21 0801    sodium chloride 3 % inhalation solution 4 mL, 4 mL, Nebulization, TID, Luiza Real PA-C, 4 mL at 01/13/21 1359    thiamine (VITAMIN B1) tablet 100 mg, 100 mg, Per NG Tube, Daily, Luiza Real PA-C, 100 mg at 01/13/21 1007    vancomycin (VANCOCIN) 1,500 mg in sodium chloride 0 9 % 250 mL IVPB, 20 mg/kg (Adjusted), Intravenous, Q12H, Luiza Real PA-C, Last Rate: 166 7 mL/hr at 01/13/21 1715, 1,500 mg at 01/13/21 1715    Allergies   Allergen Reactions    Codeine Other (See Comments)     agitated    Sulfamethoxazole-Trimethoprim GI Intolerance and Abdominal Pain     upset stomach         Review of Systems:     Constitutional:  No fever, chills or night sweats  No fatigue  No recent weight change  HEENT:  No headaches  No eye pain or discharge  No visual problems  No ear pain or discharge  No sore throat  Respiratory:  He complained of increasing shortness of breath, cough, wheezing or sputum production  Cardiovascular:  No chest pain  No palpitations  Gastrointestinal:  No abdominal pain  No nausea or vomiting  No change in bowel habits  No blood noted in the stool  Genitourinary:  Negative  Musculoskeletal: No joint pain or swelling  No limitation of range of motion  Skin:  No rashes  No itching  Neurological:  No dizziness  No lightheadedness  No focal weakness  Psychiatric:  Negative  Physical Exam    General:  The patient is sedated on ventilatory support      Vitals: /67   Pulse 66   Temp 97 9 °F (36 6 °C)   Resp (!) 28   Ht 5' 6" (1 676 m) Comment: most recent ht in chart from 11/10/20 encounter  Wt 82 7 kg (182 lb 6 5 oz)   SpO2 98%   BMI 29 44 kg/m²     HEENT: Pupils are reactive to light and accommodation  Extraocular muscles are intact  No scleral icterus  Respiratory:  Lungs are clear bilaterally  No crackles  No wheezes  Cardiovascular: S1, S2 are normal  No murmurs  No gallops  Abdominal: Soft, non-tender  Normal bowel sounds  No organomegaly  Musculoskeletal: Negative for joint or muscle tenderness or swelling  Skin: No rashes  No erythema  Extremities: No clubbing, cyanosis or edema  Pulses are normal     Neurologic:  The patient is sedated on ventilatory support  No focal deficits  LABS:    Results Reviewed     None              Xr Chest Portable    Result Date: 1/11/2021  Narrative: CHEST INDICATION:  Decreased oxygen saturation, covid+  COMPARISON:  9/5/2017, CT chest 11/15/2018 EXAM PERFORMED/VIEWS:  XR CHEST PORTABLE FINDINGS: Cardiomediastinal silhouette appears stable  Groundglass opacities throughout the right lung and left mid to lower lung zone  No pneumothorax or pleural effusion  Old right-sided rib fractures  Impression: Bilateral groundglass opacities, right greater than left  Although nonspecific, given clinical scenario, findings are suggestive of Covid-19 viral pneumonia  As per comments in EPIC, findings are concordant with preliminary interpretation provided by the emergency department  Workstation performed: [id number]     Xr Abdomen 1 View Kub    Result Date: 1/12/2021  Narrative: ABDOMEN INDICATION:   sob, abdominal distention  Patient has confirmed COVID-19  COMPARISON:  CT abdomen and pelvis study of January 10, 2020  VIEWS:  AP supine FINDINGS: There is a nonobstructive bowel gas pattern  No discernible free air on this supine study  Upright or left lateral decubitus imaging is more sensitive to detect subtle free air in the appropriate setting  No pathologic calcifications or soft tissue masses   Bilateral groundglass opacities concerning for Covid 19 pneumonia  Elevated left hemidiaphragm presumably due to the recently noted CT findings of splenomegaly  Right total hip arthroplasty  Degenerative changes of the left hip joint and lower lumbar spine  Impression: Unremarkable bowel gas pattern  Bilateral pulmonary infiltrates consistent with Covid 19 pneumonia  Persistent elevation of left hemidiaphragm likely secondary to splenomegaly as seen on the recent CT scan  Workstation performed: [id number]     Xr Chest Portable Icu    Result Date: 1/13/2021  Narrative: CHEST INDICATION:   intubation  Patient has confirmed COVID-19  COMPARISON:  1/12/2021 EXAM PERFORMED/VIEWS:  XR CHEST PORTABLE ICU FINDINGS:  Endotracheal tube tip is 3 8 cm above the mar and nasogastric tube extends below left hemidiaphragm  The cardiac silhouette is without change from the prior study  Persistent bilateral multifocal infiltrates are identified  No pneumothorax  Osseous structures appear within normal limits for patient age  Impression: Endotracheal tube tip is 3 8 cm above the mar Workstation performed: [id number]     Xr Chest Portable Icu    Result Date: 1/12/2021  Narrative: CHEST INDICATION:   sob, increase o2 requirement  Patient has confirmed COVID-19  COMPARISON:  1/10/2021 EXAM PERFORMED/VIEWS:  XR CHEST PORTABLE ICU FINDINGS: Cardiomediastinal silhouette appears unremarkable  There is diffuse patchy airspace disease, worsened  Osseous structures appear within normal limits for patient age  Impression: Diffuse patchy airspace disease, worsened and consistent with the clinical history  Workstation performed: [id number]     Us Right Upper Quadrant    Result Date: 1/12/2021  Narrative: RIGHT UPPER QUADRANT ULTRASOUND INDICATION:     RUQ pain  Patient has confirmed COVID-19   COMPARISON:  None TECHNIQUE:   Real-time ultrasound of the right upper quadrant was performed with a curvilinear transducer with both volumetric sweeps and still imaging techniques  FINDINGS: Limited study as the patient is Covid -19 positive  BILIARY: Gallbladder is underdistended limiting evaluation for wall thickening  There is a 3 mm gallbladder polyp along the anterior wall  There is a negative sonographic Soto's sign  There is no evidence for cholelithiasis  No intrahepatic biliary dilatation  CBD measures 2 mm  No choledocholithiasis  Impression: No evidence for cholelithiasis  Under distention of the gallbladder wall limits evaluation for thickening  3 mm gallbladder polyp  According to current literature guidelines (JACR 2013;10:953-956) small polyps this size are benign and no workup or followup is needed   Workstation performed: [id number]

## 2021-01-13 NOTE — PROGRESS NOTES
Critical Care Services- Interval Progress Note   Batool Musa 78 y o  male MRN: [id number]  Unit/Bed#: ICU 08 Encounter: [id number]  Assessment and Plan  Patient is a 79 y/o M whom presented on 1/10 with Acute hypoxic respiratory failure 2/2 COVID 19 which was diagnosed on 1/4  PMH most significant for CLL, HTN, HLD, GERD, and alcoholism  Since admission patient with increasing FiO2 requirements, all evening on HFNC escalating from 85/35 to 100/50  Earlier advised patient meeting CIWA with ativan doses being administered  Patient however with worsening hypoxia as well as agitation despite coaching  Precedex infusion initiated at 0100; however despite infusion as well as up titration, patient continued to remain overly agitated, with worsening hypoxia   Ultimately requiring Emergent Intubation with RSI       Diagnosis: Worsening Acute Hypoxic Respiratory Failure  o Plan: attempts to control anxiety with CIWA ativan as well as addition of precedex unsuccessful  o Suspect worsening anxiety in setting of progressive hypoxic respiratory failure  o Emergently intubated - etomidate with 7 5 ETT 1st successful attempt; however in repositioning ETT dislodged, reintubated with 8 0 ETT  o STAT CXR  o Attempts to ventilate via BVM post-intubation extremely difficult despite sedation, 1 x dose of 8mg Vec administered  o Continue telemetry and hemodynamic monitoring  o Continue MV support  o Obtain ABG in 30 mins  o Goal RASS -2 to -3  o Currently will continue Precedex 0 7 in setting of alcohol abuse, in addition will add propofol cautiously in the setting of hypotension post intubation  o Currently Peak 21 with plateau of 17  o If persistently hypoxic may need to increase PEEP as well as consider addition of inhaled epoprostenol / continued paralyzation / proning  o Despite low procalcitonin, will broaden antibiotic coverage to Cefepime, Vancomycin  o Obtain MRSA swab  o Obtain Sputum culture     Diagnosis: COVID-19 Pneumonia  o Plan: continue COVID treatment algorithm, escalating to the VERY SEVERE algorithm  o Continue to trend inflammatory markers      Contacted son and updated regarding decline in fathers condition ultimately requiring intubation  The above decompensation as well as Assessment / Plan discussed with Dr Eyad Larkin whom is in agreement as outline above  Upon my evaluation, this patient had a high probability of imminent or life-threatening deterioration due to Worsening Hypoxic respiratory failure in the setting of COVID-19 Pneumonia, which required my direct attention, intervention, and personal management  I have personally provided 70 minutes (0100 to 0210) on 01/13/2021 of critical care time, exclusive of procedures, teaching, family meetings, and any prior time recorded by providers other than myself  Time includes review of laboratory data, review of imaging/radiology results, discussion with consultants, monitoring for potential decompensation    Interventions were performed as documented above    -------------------------------------------------------------------------------------------------------------------------------------  Hemodynamic Monitoring:  Vital Signs:   HR: 100s  BP: 185/90  RR: 48  SpO2: 72 on 100 oxygen via HFNC  Cardiac Monitoring:   Telemetry rhythm: Sinus with occasional PVCs      Laboratory   Results from last 7 days   Lab Units 01/12/21  1609 01/12/21  0352 01/11/21  0425 01/10/21  1250 01/10/21  0931 01/10/21  0920 01/10/21  0853   WBC Thousand/uL 110 11* 105 45* 65 14*  --   --   --  50 92*   HEMOGLOBIN g/dL 7 8* 7 5* 8 7*  --   --   --  9 1*   I STAT HEMOGLOBIN g/dl  --   --   --   --  8 2* 7 5*  --    HEMATOCRIT % 25 7* 23 9* 26 9*  --   --   --  26 5*   HEMATOCRIT, ISTAT %  --   --   --   --  24* 22*  --    PLATELETS Thousands/uL 359 327 237 220  --   --  241   NEUTROS PCT %  --  11*  --   --   --   --   --    BANDS PCT %  --   --  2  --   --   --  1   MONOS PCT %  -- 0*  --   --   --   --   --    MONO PCT % 1*  --  5  --   --   --  1*     Results from last 7 days   Lab Units 01/12/21  0352 01/12/21  0025 01/11/21  1546 01/11/21  0800 01/11/21  0425 01/11/21  0414 01/10/21  2238 01/10/21  1250  01/10/21  0852   SODIUM mmol/L 129* 127* 126* 127*  --  125* 124* 121*  --  122*   POTASSIUM mmol/L 4 5 5 1 4 2 4 1  --  4 1 4 2 4 4  --  4 5   CHLORIDE mmol/L 97* 94* 93* 94*  --  92* 91* 88*  --  87*   CO2 mmol/L 25 24 22 24  --  24 23 24  --  26   CO2, I-STAT   --   --   --   --   --   --   --   --    < >  --    ANION GAP mmol/L 7 9 11 9  --  9 10 9  --  9   BUN mg/dL 18 19 20 21  --  26* 26* 26*  --  28*   CREATININE mg/dL 0 75 0 81 0 88 0 78  --  0 91 1 06 1 13  --  1 22*   CALCIUM mg/dL 8 3 8 4 8 5 8 5  --  8 3 8 4 8 4  --  8 8   GLUCOSE RANDOM mg/dL 150* 160* 176* 153*  --  165* 173* 143*  --  127   ALT U/L 162*  --   --   --  73  --   --  51  --  45   AST U/L 174*  --   --   --  100*  --   --  53*  --  50*   ALK PHOS U/L 133*  --   --   --  107  --   --  116  --  103 8   ALBUMIN g/dL 2 5*  --   --   --  2 5*  --   --  2 7*  --  3 7   TOTAL BILIRUBIN mg/dL 2 22*  --   --   --  1 49*  --   --  1 97*  --  2 77*    < > = values in this interval not displayed  Results from last 7 days   Lab Units 01/11/21  0425 01/10/21  0852   MAGNESIUM mg/dL 2 0 1 9   PHOSPHORUS mg/dL 3 4  --       Results from last 7 days   Lab Units 01/10/21  0852   INR  1 18*   PTT seconds 35*      Results from last 7 days   Lab Units 01/10/21  0852   TROPONIN I ng/mL <0 03     Results from last 7 days   Lab Units 01/10/21  0852   LACTIC ACID mmol/L 0 7       Results from last 7 days   Lab Units 01/11/21  0425 01/10/21  1250 01/10/21  0852   PROCALCITONIN ng/ml 0 14 0 21 0 26*       Micro  Results from last 7 days   Lab Units 01/10/21  1423 01/10/21  1251 01/10/21  1012 01/10/21  0852   BLOOD CULTURE  No Growth at 48 hrs  No Growth at 48 hrs  No Growth at 48 hrs  No Growth at 48 hrs         Diagnostic Imaging / Data: I have personally reviewed pertinent reports     and I have personally reviewed pertinent films in PACS    Medications:  Current Facility-Administered Medications   Medication Dose Route Frequency    albuterol (PROVENTIL HFA,VENTOLIN HFA) inhaler 2 puff  2 puff Inhalation Q4H PRN    ascorbic acid (VITAMIN C) tablet 1,000 mg  1,000 mg Oral Q12H [address]    aspirin (ECOTRIN LOW STRENGTH) EC tablet 81 mg  81 mg Oral Daily    atorvastatin (LIPITOR) tablet 40 mg  40 mg Oral HS    chlorhexidine (PERIDEX) 0 12 % oral rinse 15 mL  15 mL Swish & Spit Q12H [address]    chlorhexidine (PERIDEX) 0 12 % oral rinse 15 mL  15 mL Swish & Spit Q12H [address]    cholecalciferol (VITAMIN D3) tablet 2,000 Units  2,000 Units Oral Daily    dexamethasone (PF) (DECADRON) injection 8 5 mg  0 1 mg/kg Intravenous Q12H    dexmedetomidine (PRECEDEX) 400 mcg in sodium chloride 0 9% 100 ml IVPB  0 1-0 7 mcg/kg/hr Intravenous Titrated    dextromethorphan-guaiFENesin (ROBITUSSIN DM) oral syrup 10 mL  10 mL Oral Q4H PRN    enoxaparin (LOVENOX) subcutaneous injection 30 mg  30 mg Subcutaneous Q12H CHET    famotidine (PEPCID) tablet 20 mg  20 mg Oral BID    fentanyl citrate (PF) 100 MCG/2ML **ADS Override Pull**        fish oil capsule 1,000 mg  1,000 mg Oral Daily    fluticasone (FLONASE) 50 mcg/act nasal spray 2 spray  2 spray Nasal Daily PRN    folic acid (FOLVITE) tablet 1 mg  1 mg Oral Daily    glycerin-hypromellose- (ARTIFICIAL TEARS) ophthalmic solution 1 drop  1 drop Both Eyes Q3H PRN    ipratropium (ATROVENT) 0 02 % inhalation solution 0 5 mg  0 5 mg Nebulization Q8H PRN    levalbuterol (XOPENEX) inhalation solution 1 25 mg  1 25 mg Nebulization Q8H PRN    melatonin tablet 6 mg  6 mg Oral HS    montelukast (SINGULAIR) tablet 10 mg  10 mg Oral Daily    multi-electrolyte (ISOLYTE-S PH 7 4) bolus 500 mL  500 mL Intravenous Once    zinc sulfate (ZINCATE) capsule 220 mg  220 mg Oral Daily    Followed by   Adenike Ross ON 1/17/2021] multivitamin-minerals (CENTRUM ADULTS) tablet 1 tablet  1 tablet Oral Daily    polyethylene glycol (MIRALAX) packet 17 g  17 g Oral Daily    propofol (DIPRIVAN) 1000 mg in 100 mL infusion (premix)  5-50 mcg/kg/min Intravenous Titrated    remdesivir (Veklury) 100 mg in sodium chloride 0 9 % 250 mL IVPB  100 mg Intravenous Q24H    senna-docusate sodium (SENOKOT S) 8 6-50 mg per tablet 1 tablet  1 tablet Oral HS    sertraline (ZOLOFT) tablet 50 mg  50 mg Oral Daily    sodium chloride 0 9 % infusion  50 mL/hr Intravenous Continuous    tamsulosin (FLOMAX) capsule 0 4 mg  0 4 mg Oral Daily With Dinner    thiamine (VITAMIN B1) tablet 100 mg  100 mg Oral Daily    zolpidem (AMBIEN) tablet 10 mg  10 mg Oral HS       SIGNATURE: Maury López PA-C    Portions of the record may have been created with voice recognition software  Occasional wrong word or "sound a like" substitutions may have occurred due to the inherent limitations of voice recognition software    Read the chart carefully and recognize, using context, where substitutions have occurred

## 2021-01-13 NOTE — OCCUPATIONAL THERAPY NOTE
Occupational Therapy Evaluation     Patient Name: Nicoalsa CHATTERJEE Date: 1/13/2021  Problem List  Principal Problem:    COVID-19  Active Problems:    Essential hypertension    [facility]ch[facility] ([facility] )    Hypercholesterolemia    Mild depression (HCC)    BPH with obstruction/lower urinary tract symptoms    Alcohol abuse    Acute respiratory failure with hypoxia [facility])    Past Medical History  Past Medical History:   Diagnosis Date    Anxiety     BPH (benign prostatic hypertrophy)     CLL [facility]ch[facility]ocytic [facility]) ([facility] )     2009    Colon polyp     Concussion     Resolved: 08/22/16    Depression     Gastrointestinal hemorrhage     Last assessed: 08/27/13    GERD (gastroesophageal reflux disease)     Hearing loss of aging     Hiatal hernia     History of transfusion     Hyperlipidemia     Hypertension     Iron deficiency anemia     Microscopic hematuria     Last assessed: 06/28/13    OA (osteoarthritis)     right hip    Pneumothorax     Prostatitis     Pulmonary hypertension (HCC)     Seasonal allergies     UTI (urinary tract infection)     in past    Wears glasses      Past Surgical History  Past Surgical History:   Procedure Laterality Date    COLONOSCOPY      CYSTOSCOPY  10/09/2014    Diagnostic    CYSTOSCOPY  06/29/2020    FRACTURE SURGERY      left lower arm    FRACTURE SURGERY      left femur    HERNIA REPAIR      JOINT REPLACEMENT Right     hip    OTHER SURGICAL HISTORY  03/2011    Spinal anesthesia epidural    FL TOTAL HIP ARTHROPLASTY Right 9/29/2017    Procedure: ARTHROPLASTY HIP TOTAL ANTERIOR;  Surgeon: Adrian Madrigal MD;  Location: [facility];  Service: Orthopedics    TONSILLECTOMY AND ADENOIDECTOMY      TRANSURETHRAL RESECTION OF PROSTATE      x 2    WRIST SURGERY          01/13/21 1209   OT Last Visit   OT Visit Date 01/13/21  (Wednesday)   Note Type   Note type Evaluation   Cancel Reasons Intubated/sedated   Assessment   Assessment OT orders received and chart review completed  Spoke w/ [email or web address]   Pt intubated over night and currently not appropriate to participate in OT eval  Will continue to follow as appropriate and schedule allows      Marylen Furl, OT

## 2021-01-13 NOTE — RESPIRATORY THERAPY NOTE
01/13/21 0240   Vent Information   Vent    Vent type     Vent Mode AC/VC   $ Vent Charge-INITIAL Yes   Ventilator Start Yes   $ Pulse Oximetry Spot Check Charge Completed   SpO2 91 %   AC/VC Settings   Resp Rate (BPM) 16 BPM   Vt (mL) 450 mL   FIO2 (%) 100 %   PEEP (cmH2O) 6 cmH2O   Flow Pattern (LPM) 55 L/min   Trigger Sensitivity Flow (lpm) 3 %   Humidification Heater   Heater Temperature (Set) 98 6 °F (37 °C)   AC/VC Actuals   Resp Rate (BPM) 16 BPM   VT (mL) 480   MV 7 7   MAP (cmH2O) 11 cmH2O   Peak Pressure (cmH2O) 21 cmH2O   I/E Ratio (Obs) 1:3 2   Heater Temperature (Obs) 98 6 °F (37 °C)   Static Compliance (mL/cmH20) 43 mL/cmH2O   Plateau Pressure (cm H2O) 17 cm H2O   AC/VC Alarms   High Peak Pressure (cmH2O) 40   High Resp Rate (BPM) 40 BPM   High MV (L/min) 16 L/min   Low MV (L/min) 3 L/min   Vt High (mL) 900 mL   Vt Low (mL) 250 mL   AC/VC Apnea Settings   Resp Rate (BPM) 16 BPM   VT (mL) 450 mL   FIO2 (%) 100 %   Apnea Time (s) 20 S   Apnea Flow (L/min) 55 L/min   Maintenance   Alarm (pink) cable attached Yes   Resuscitation bag with peep valve at bedside Yes   Water bag changed No   Circuit changed No   ETT  Oral;Hi-Lo; Cuffed 8 mm   Placement Date/Time: 01/13/21 0220   Mask Ventilation: Ventilated by mask with oral airway (2)  Preoxygenated: Yes  Type: Oral;Hi-Lo; Cuffed  Tube Size: 8 mm  Location: Oral  Placement Verification: Auscultation; Chest x-ray;End tidal CO2;Symmetrical ch    Secured at (cm) 25   Measured from Teeth   Secured Location Right   Secured by Commercial tube saleh   Site Condition Dry   HI-LO Suction  Intermittent suction   HI-LO Secretions Small;Tan;Blood tinged   HI-LO Intervention Patent   RT Ventilator Management Note  Joyce Meehan 78 y o  male MRN: [id number]  Unit/Bed#: ICU 08 Encounter: [id number]      Daily Screen     No data found in the last 10 encounters              Physical Exam:          Resp Comments: pt extremely agitated, pulling off all O2, increased O2 an flow, prn neb given for exp wheezing, RN aware

## 2021-01-14 LAB
ABO GROUP BLD BPU: NORMAL
ALBUMIN SERPL BCP-MCNC: 2 G/DL (ref 3.5–5)
ALP SERPL-CCNC: 115 U/L (ref 46–116)
ALT SERPL W P-5'-P-CCNC: 99 U/L (ref 12–78)
ANION GAP SERPL CALCULATED.3IONS-SCNC: 6 MMOL/L (ref 4–13)
ANION GAP SERPL CALCULATED.3IONS-SCNC: 8 MMOL/L (ref 4–13)
ARTERIAL PATENCY WRIST A: YES
AST SERPL W P-5'-P-CCNC: 67 U/L (ref 5–45)
ATRIAL RATE: 55 BPM
BASE EXCESS BLDA CALC-SCNC: 0.1 MMOL/L
BASOPHILS # BLD AUTO: 0.02 THOUSANDS/ΜL (ref 0–0.1)
BASOPHILS NFR BLD AUTO: 0 % (ref 0–1)
BILIRUB SERPL-MCNC: 2.48 MG/DL (ref 0.2–1)
BPU ID: NORMAL
BUN SERPL-MCNC: 19 MG/DL (ref 5–25)
BUN SERPL-MCNC: 21 MG/DL (ref 5–25)
CA-I BLD-SCNC: 1.08 MMOL/L (ref 1.12–1.32)
CALCIUM ALBUM COR SERPL-MCNC: 9.4 MG/DL (ref 8.3–10.1)
CALCIUM SERPL-MCNC: 7.8 MG/DL (ref 8.3–10.1)
CALCIUM SERPL-MCNC: 7.8 MG/DL (ref 8.3–10.1)
CHLORIDE SERPL-SCNC: 102 MMOL/L (ref 100–108)
CHLORIDE SERPL-SCNC: 103 MMOL/L (ref 100–108)
CO2 SERPL-SCNC: 24 MMOL/L (ref 21–32)
CO2 SERPL-SCNC: 26 MMOL/L (ref 21–32)
CREAT SERPL-MCNC: 0.61 MG/DL (ref 0.6–1.3)
CREAT SERPL-MCNC: 0.66 MG/DL (ref 0.6–1.3)
CROSSMATCH: NORMAL
CRP SERPL QL: 199.1 MG/L
D DIMER PPP FEU-MCNC: 17.59 UG/ML FEU
EOSINOPHIL # BLD AUTO: 0 THOUSAND/ΜL (ref 0–0.61)
EOSINOPHIL NFR BLD AUTO: 0 % (ref 0–6)
ERYTHROCYTE [DISTWIDTH] IN BLOOD BY AUTOMATED COUNT: 18.4 % (ref 11.6–15.1)
FERRITIN SERPL-MCNC: 1965 NG/ML (ref 8–388)
GFR SERPL CREATININE-BSD FRML MDRD: 103 ML/MIN/1.73SQ M
GFR SERPL CREATININE-BSD FRML MDRD: 99 ML/MIN/1.73SQ M
GLUCOSE SERPL-MCNC: 157 MG/DL (ref 65–140)
GLUCOSE SERPL-MCNC: 184 MG/DL (ref 65–140)
GLUCOSE SERPL-MCNC: 184 MG/DL (ref 65–140)
GLUCOSE SERPL-MCNC: 190 MG/DL (ref 65–140)
GLUCOSE SERPL-MCNC: 206 MG/DL (ref 65–140)
GLUCOSE SERPL-MCNC: <20 MG/DL (ref 65–140)
HCO3 BLDA-SCNC: 25 MMOL/L (ref 22–28)
HCT VFR BLD AUTO: 21.5 % (ref 36.5–49.3)
HCT VFR BLD AUTO: 22.8 % (ref 36.5–49.3)
HCT VFR BLD AUTO: 23.6 % (ref 36.5–49.3)
HGB BLD-MCNC: 6.7 G/DL (ref 12–17)
HGB BLD-MCNC: 7.2 G/DL (ref 12–17)
HGB BLD-MCNC: 7.2 G/DL (ref 12–17)
HOROWITZ INDEX BLDA+IHG-RTO: 90 MM[HG]
IMM GRANULOCYTES # BLD AUTO: 0.24 THOUSAND/UL (ref 0–0.2)
IMM GRANULOCYTES NFR BLD AUTO: 0 % (ref 0–2)
INR PPP: 1.61 (ref 0.84–1.19)
LYMPHOCYTES # BLD AUTO: 66.88 THOUSANDS/ΜL (ref 0.6–4.47)
LYMPHOCYTES NFR BLD AUTO: 90 % (ref 14–44)
MAGNESIUM SERPL-MCNC: 2.5 MG/DL (ref 1.6–2.6)
MCH RBC QN AUTO: 31.3 PG (ref 26.8–34.3)
MCHC RBC AUTO-ENTMCNC: 31.6 G/DL (ref 31.4–37.4)
MCV RBC AUTO: 99 FL (ref 82–98)
MONOCYTES # BLD AUTO: 0.7 THOUSAND/ΜL (ref 0.17–1.22)
MONOCYTES NFR BLD AUTO: 1 % (ref 4–12)
NEUTROPHILS # BLD AUTO: 6.32 THOUSANDS/ΜL (ref 1.85–7.62)
NEUTS SEG NFR BLD AUTO: 9 % (ref 43–75)
NRBC BLD AUTO-RTO: 0 /100 WBCS
O2 CT BLDA-SCNC: 12.3 ML/DL (ref 16–23)
OXYHGB MFR BLDA: 95.4 % (ref 94–97)
P AXIS: 118 DEGREES
PCO2 BLDA: 41.3 MM HG (ref 36–44)
PEEP RESPIRATORY: 10 CM[H2O]
PH BLDA: 7.4 [PH] (ref 7.35–7.45)
PLATELET # BLD AUTO: 266 THOUSANDS/UL (ref 149–390)
PMV BLD AUTO: 9.7 FL (ref 8.9–12.7)
PO2 BLDA: 86.2 MM HG (ref 75–129)
POTASSIUM SERPL-SCNC: 4.6 MMOL/L (ref 3.5–5.3)
POTASSIUM SERPL-SCNC: 4.9 MMOL/L (ref 3.5–5.3)
PR INTERVAL: 154 MS
PROCALCITONIN SERPL-MCNC: 0.06 NG/ML
PROT SERPL-MCNC: 6.3 G/DL (ref 6.4–8.2)
PROTHROMBIN TIME: 19.3 SECONDS (ref 11.6–14.5)
QRS AXIS: 64 DEGREES
QRSD INTERVAL: 94 MS
QT INTERVAL: 700 MS
QTC INTERVAL: 669 MS
RBC # BLD AUTO: 2.3 MILLION/UL (ref 3.88–5.62)
SODIUM SERPL-SCNC: 134 MMOL/L (ref 136–145)
SODIUM SERPL-SCNC: 135 MMOL/L (ref 136–145)
SPECIMEN SOURCE: ABNORMAL
T WAVE AXIS: 31 DEGREES
TROPONIN I SERPL-MCNC: 0.22 NG/ML
TROPONIN I SERPL-MCNC: 0.28 NG/ML
TROPONIN I SERPL-MCNC: 0.34 NG/ML
UNIT DISPENSE STATUS: NORMAL
UNIT PRODUCT CODE: NORMAL
UNIT RH: NORMAL
VANCOMYCIN TROUGH SERPL-MCNC: 17.5 UG/ML (ref 10–20)
VENT AC: 16
VENT- AC: AC
VENTRICULAR RATE: 55 BPM
VT SETTING VENT: 450 ML
WBC # BLD AUTO: 74.16 THOUSAND/UL (ref 4.31–10.16)

## 2021-01-14 PROCEDURE — 86140 C-REACTIVE PROTEIN: CPT | Performed by: NURSE PRACTITIONER

## 2021-01-14 PROCEDURE — 94760 N-INVAS EAR/PLS OXIMETRY 1: CPT

## 2021-01-14 PROCEDURE — 84145 PROCALCITONIN (PCT): CPT | Performed by: FAMILY MEDICINE

## 2021-01-14 PROCEDURE — 93010 ELECTROCARDIOGRAM REPORT: CPT | Performed by: INTERNAL MEDICINE

## 2021-01-14 PROCEDURE — 30233N1 TRANSFUSION OF NONAUTOLOGOUS RED BLOOD CELLS INTO PERIPHERAL VEIN, PERCUTANEOUS APPROACH: ICD-10-PCS | Performed by: INTERNAL MEDICINE

## 2021-01-14 PROCEDURE — 82805 BLOOD GASES W/O2 SATURATION: CPT | Performed by: NURSE PRACTITIONER

## 2021-01-14 PROCEDURE — 82728 ASSAY OF FERRITIN: CPT | Performed by: NURSE PRACTITIONER

## 2021-01-14 PROCEDURE — 94762 N-INVAS EAR/PLS OXIMTRY CONT: CPT

## 2021-01-14 PROCEDURE — 85027 COMPLETE CBC AUTOMATED: CPT | Performed by: NURSE PRACTITIONER

## 2021-01-14 PROCEDURE — 83735 ASSAY OF MAGNESIUM: CPT | Performed by: NURSE PRACTITIONER

## 2021-01-14 PROCEDURE — 94150 VITAL CAPACITY TEST: CPT

## 2021-01-14 PROCEDURE — 85014 HEMATOCRIT: CPT | Performed by: NURSE PRACTITIONER

## 2021-01-14 PROCEDURE — 94640 AIRWAY INHALATION TREATMENT: CPT

## 2021-01-14 PROCEDURE — 80202 ASSAY OF VANCOMYCIN: CPT | Performed by: PHYSICIAN ASSISTANT

## 2021-01-14 PROCEDURE — 84484 ASSAY OF TROPONIN QUANT: CPT | Performed by: NURSE PRACTITIONER

## 2021-01-14 PROCEDURE — 82948 REAGENT STRIP/BLOOD GLUCOSE: CPT

## 2021-01-14 PROCEDURE — 85379 FIBRIN DEGRADATION QUANT: CPT | Performed by: NURSE PRACTITIONER

## 2021-01-14 PROCEDURE — 80048 BASIC METABOLIC PNL TOTAL CA: CPT | Performed by: NURSE PRACTITIONER

## 2021-01-14 PROCEDURE — 99291 CRITICAL CARE FIRST HOUR: CPT | Performed by: INTERNAL MEDICINE

## 2021-01-14 PROCEDURE — P9016 RBC LEUKOCYTES REDUCED: HCPCS

## 2021-01-14 PROCEDURE — 94003 VENT MGMT INPAT SUBQ DAY: CPT

## 2021-01-14 PROCEDURE — 82330 ASSAY OF CALCIUM: CPT | Performed by: FAMILY MEDICINE

## 2021-01-14 PROCEDURE — 85018 HEMOGLOBIN: CPT | Performed by: NURSE PRACTITIONER

## 2021-01-14 PROCEDURE — 36600 WITHDRAWAL OF ARTERIAL BLOOD: CPT

## 2021-01-14 PROCEDURE — 80053 COMPREHEN METABOLIC PANEL: CPT | Performed by: NURSE PRACTITIONER

## 2021-01-14 RX ORDER — MIDAZOLAM HYDROCHLORIDE 2 MG/2ML
2 INJECTION, SOLUTION INTRAMUSCULAR; INTRAVENOUS EVERY 4 HOURS PRN
Status: DISCONTINUED | OUTPATIENT
Start: 2021-01-14 | End: 2021-01-15

## 2021-01-14 RX ADMIN — FENTANYL CITRATE 50 MCG: 50 INJECTION INTRAMUSCULAR; INTRAVENOUS at 05:36

## 2021-01-14 RX ADMIN — INSULIN LISPRO 1 UNITS: 100 INJECTION, SOLUTION INTRAVENOUS; SUBCUTANEOUS at 11:38

## 2021-01-14 RX ADMIN — FENTANYL CITRATE 50 MCG: 50 INJECTION INTRAMUSCULAR; INTRAVENOUS at 21:03

## 2021-01-14 RX ADMIN — REMDESIVIR 100 MG: 100 INJECTION, POWDER, LYOPHILIZED, FOR SOLUTION INTRAVENOUS at 14:30

## 2021-01-14 RX ADMIN — ZINC SULFATE 220 MG (50 MG) CAPSULE 220 MG: CAPSULE at 08:06

## 2021-01-14 RX ADMIN — PROPOFOL 50 MCG/KG/MIN: 10 INJECTION, EMULSION INTRAVENOUS at 13:50

## 2021-01-14 RX ADMIN — CHLORHEXIDINE GLUCONATE 0.12% ORAL RINSE 15 ML: 1.2 LIQUID ORAL at 08:06

## 2021-01-14 RX ADMIN — OXYCODONE HYDROCHLORIDE AND ACETAMINOPHEN 1000 MG: 500 TABLET ORAL at 21:00

## 2021-01-14 RX ADMIN — LEVALBUTEROL HYDROCHLORIDE 1.25 MG: 1.25 SOLUTION, CONCENTRATE RESPIRATORY (INHALATION) at 07:20

## 2021-01-14 RX ADMIN — PROPOFOL 50 MCG/KG/MIN: 10 INJECTION, EMULSION INTRAVENOUS at 01:28

## 2021-01-14 RX ADMIN — DEXAMETHASONE SODIUM PHOSPHATE 8.5 MG: 10 INJECTION, SOLUTION INTRAMUSCULAR; INTRAVENOUS at 21:22

## 2021-01-14 RX ADMIN — SODIUM CHLORIDE SOLN NEBU 3% 4 ML: 3 NEBU SOLN at 07:20

## 2021-01-14 RX ADMIN — ENOXAPARIN SODIUM 80 MG: 80 INJECTION SUBCUTANEOUS at 21:00

## 2021-01-14 RX ADMIN — PROPOFOL 50 MCG/KG/MIN: 10 INJECTION, EMULSION INTRAVENOUS at 05:30

## 2021-01-14 RX ADMIN — OXYCODONE HYDROCHLORIDE AND ACETAMINOPHEN 1000 MG: 500 TABLET ORAL at 08:06

## 2021-01-14 RX ADMIN — SODIUM CHLORIDE SOLN NEBU 3% 4 ML: 3 NEBU SOLN at 19:03

## 2021-01-14 RX ADMIN — DEXAMETHASONE SODIUM PHOSPHATE 8.5 MG: 10 INJECTION, SOLUTION INTRAMUSCULAR; INTRAVENOUS at 08:07

## 2021-01-14 RX ADMIN — BISACODYL 10 MG: 10 SUPPOSITORY RECTAL at 16:33

## 2021-01-14 RX ADMIN — PROPOFOL 50 MCG/KG/MIN: 10 INJECTION, EMULSION INTRAVENOUS at 08:16

## 2021-01-14 RX ADMIN — LEVALBUTEROL HYDROCHLORIDE 1.25 MG: 1.25 SOLUTION, CONCENTRATE RESPIRATORY (INHALATION) at 19:03

## 2021-01-14 RX ADMIN — VANCOMYCIN HYDROCHLORIDE 1500 MG: 5 INJECTION, POWDER, LYOPHILIZED, FOR SOLUTION INTRAVENOUS at 17:15

## 2021-01-14 RX ADMIN — ENOXAPARIN SODIUM 80 MG: 80 INJECTION SUBCUTANEOUS at 08:06

## 2021-01-14 RX ADMIN — Medication 2000 UNITS: at 08:06

## 2021-01-14 RX ADMIN — CHLORHEXIDINE GLUCONATE 0.12% ORAL RINSE 15 ML: 1.2 LIQUID ORAL at 21:00

## 2021-01-14 RX ADMIN — ASPIRIN 81 MG: 81 TABLET ORAL at 08:06

## 2021-01-14 RX ADMIN — THIAMINE HCL TAB 100 MG 100 MG: 100 TAB at 08:06

## 2021-01-14 RX ADMIN — POLYETHYLENE GLYCOL 3350 17 G: 17 POWDER, FOR SOLUTION ORAL at 08:06

## 2021-01-14 RX ADMIN — SODIUM CHLORIDE SOLN NEBU 3% 4 ML: 3 NEBU SOLN at 13:30

## 2021-01-14 RX ADMIN — VANCOMYCIN HYDROCHLORIDE 1500 MG: 5 INJECTION, POWDER, LYOPHILIZED, FOR SOLUTION INTRAVENOUS at 05:31

## 2021-01-14 RX ADMIN — INSULIN LISPRO 1 UNITS: 100 INJECTION, SOLUTION INTRAVENOUS; SUBCUTANEOUS at 17:32

## 2021-01-14 RX ADMIN — FAMOTIDINE 20 MG: 40 POWDER, FOR SUSPENSION ORAL at 17:32

## 2021-01-14 RX ADMIN — ATORVASTATIN CALCIUM 40 MG: 40 TABLET, FILM COATED ORAL at 21:00

## 2021-01-14 RX ADMIN — FAMOTIDINE 20 MG: 40 POWDER, FOR SUSPENSION ORAL at 08:06

## 2021-01-14 RX ADMIN — MELATONIN 6 MG: at 21:00

## 2021-01-14 RX ADMIN — FOLIC ACID 1 MG: 1 TABLET ORAL at 08:06

## 2021-01-14 RX ADMIN — MONTELUKAST SODIUM 10 MG: 10 TABLET, FILM COATED ORAL at 08:06

## 2021-01-14 RX ADMIN — CEFEPIME HYDROCHLORIDE 1000 MG: 1 INJECTION, POWDER, FOR SOLUTION INTRAMUSCULAR; INTRAVENOUS at 14:15

## 2021-01-14 RX ADMIN — PROPOFOL 50 MCG/KG/MIN: 10 INJECTION, EMULSION INTRAVENOUS at 23:34

## 2021-01-14 RX ADMIN — LEVALBUTEROL HYDROCHLORIDE 1.25 MG: 1.25 SOLUTION, CONCENTRATE RESPIRATORY (INHALATION) at 13:30

## 2021-01-14 RX ADMIN — PROPOFOL 50 MCG/KG/MIN: 10 INJECTION, EMULSION INTRAVENOUS at 21:45

## 2021-01-14 RX ADMIN — CEFEPIME HYDROCHLORIDE 1000 MG: 1 INJECTION, POWDER, FOR SOLUTION INTRAMUSCULAR; INTRAVENOUS at 02:18

## 2021-01-14 RX ADMIN — PROPOFOL 50 MCG/KG/MIN: 10 INJECTION, EMULSION INTRAVENOUS at 16:27

## 2021-01-14 NOTE — QUICK NOTE
Called patient's son Juan Francisco Posey to update  Informed that we provided 2 units of blood overnight and that we are going to do a rectal exam to look for blood in the stool  Otherwise his vent settings are up a little today  And informed that he is on broad spectrum antibiotics  All questions were answered at this time

## 2021-01-14 NOTE — PROGRESS NOTES
Daily Progress Note - [address] [facility] 78 y o  male MRN: [id number]  Unit/Bed#: ICU 08 Encounter: [id number]        ----------------------------------------------------------------------------------------  HPI/24hr events:  29-year-old male admitted due to acute hypoxic respiratory failure and hypotension in the setting of COVID pneumonia  Past medical history includes CLL, BPH with urinary retention, hypertension, pulmonary hypertension, hypercholesterolemia, depression/anxiety  Received 2L of blood overnight  Up to 6 7 following first unit awaiting Hgb following second unit   Troponins were evaluated in the setting of bradycardia      ---------------------------------------------------------------------------------------  SUBJECTIVE  Unable to obtain due to patient mental status    Review of Systems  Review of systems was reviewed and negative unless stated above in HPI/24-hour events   ---------------------------------------------------------------------------------------  Assessment and Plan:    Neuro:    Diagnosis:  Depression, ETOH abuse  o RASS -4; CAM-ICU unable to assess  o Plan:  Propofol 50  o Precedex weaned in the setting of bradycardia  o Continue melatonin  o Continue Zoloft  o Continue thiamine and folate      CV:    Diagnosis:  Shock secondary to sedation  o Plan:  Maintain MEP greater than 65  o Nor epi on hold   o Hemodynamically stable overnight   Diagnosis:  Pulmonary hypertension, hypertension  o Plan:  No diuresis at this time  o Echo 04/03/2019 EF 60%  o Holding home antihypertensives including Norvasc ASA Coreg Cozaar      Pulm:   Diagnosis:  Acute hypoxic respiratory failure in the setting of COVID-19 pneumonia  o Plan:  Intubated on 01/13  o 16/440/8/80 RR 40s SpO2 95%  o Wean Fi O2 as tolerated  o Ferritin 2430, , D-dimer 16 inflammatory markers trending up  o Very severe COVID-19 protocol  o Continue remdesivir, Decadron  o Status post cuffless plasma 1/11  o Continue multivitamins, atorvastatin  o Q 8 hours xopenex      GI:    Diagnosis:  Last BM 1/11  o Plan:  Jevity 1 2 calories continues 20   Diagnosis:  GERD  o Plan:  Continue on liquid Pepcid      :    Diagnosis:  Hyponatremia  o Plan:  Stablized at 133 over the past 24 hours  o No fluids at this time   Diagnosis:  PHAM  o Plan:  Likely in the setting of poor p o  Intake  o Baseline creatinine 0 85-0 87  o Admission creatinine 1 22  o Currently 0 71 resolved  o Place Hillman catheter      F/E/N:    Plan:  No fluid her diuresis at this time   Electrolytes will replete as necessary   Jevity 1 2 calories at 40 mL/hr with additional 2 packets of [employer] TF daily      Heme/Onc:    Diagnosis: CLL  o Plan: therapeutic lovenox  o Heme consult for leukocytosis and concern for leukostasis appreciated; no intervention recommended at this time  o WBC max 110 on 1/12 down to 70 on 1/13      Endo:    Diagnosis: no active issues  o Plan: Blood glucose well controlled  o Plan to trend glucose and maintain between 140-180      ID:    Diagnosis: COVID PNA severe pathway  o Plan: Remdesivir day 5/5  o Decadron day 5/5  o Convalescent plasma 1/11  o On cefepime and vanc day 2 (previously on ceftriaxone and doxy)  o Last Procal 0 21  o D-dimer >20,  7, Ferritin 2430, continues to increase  o Will consider plasmapharesis      MSK/Skin:    Diagnosis: no active issues  o Plan: frequent repositioning and pressure offloading  o Local wound care as needed      Disposition: Continue Critical Care   Code Status: Level 1 - Full Code  ---------------------------------------------------------------------------------------  ICU CORE MEASURES    Prophylaxis   VTE Pharmacologic Prophylaxis: On therapeutic Lovenox  VTE Mechanical Prophylaxis: sequential compression device  Stress Ulcer Prophylaxis: Famotidine PO    ABCDE Protocol (if indicated)  Plan to perform spontaneous awakening trial today?  yes  Plan to perform spontaneous breathing trial today? yes  Obvious barriers to extubation? CAM-ICU: Unable to evaluate    Invasive Devices Review  Invasive Devices     Peripheral Intravenous Line            Peripheral IV 21 Left Forearm 2 days    Peripheral IV 21 Dorsal (posterior); Right Hand 1 day    Long-Dwell Peripheral IV (Midline) 49/55/11 Left Cephalic less than 1 day          Drain            NG/OG/Enteral Tube Orogastric 18 Fr Right mouth 1 day    Urethral Catheter Latex 16 Fr  less than 1 day          Airway            ETT  Oral;Hi-Lo; Cuffed 8 mm 1 day              Can any invasive devices be discontinued today? No  ---------------------------------------------------------------------------------------  OBJECTIVE    Vitals   Vitals:    21 0115 21 0130 21 0200 21 0410   BP: 110/66 113/70 114/67    Pulse: 62 62 58    Resp: (!) 26 (!) 23 (!) 23    Temp: (!) 97 4 °F (36 3 °C) (!) 97 4 °F (36 3 °C) (!) 97 4 °F (36 3 °C)    TempSrc:       SpO2: 91% 90% 94% 94%   Weight:       Height:         Temp (24hrs), Av °F (36 7 °C), Min:97 1 °F (36 2 °C), Max:99 6 °F (37 6 °C)  Current: Temperature: (!) 97 4 °F (36 3 °C)      Respiratory:  SpO2: SpO2: 91 %  Nasal Cannula O2 Flow Rate (L/min): 10 L/min    Invasive/non-invasive ventilation settings   Respiratory    Lab Data (Last 4 hours)    None         O2/Vent Data (Last 4 hours)      410           Vent Mode AC/VC       Resp Rate (BPM) (BPM) 16       Vt (mL) (mL) 450       FIO2 (%) (%) 95       PEEP (cmH2O) (cmH2O) 10       MV 10 5                   Physical Exam  Constitutional:       General: He is not in acute distress  Appearance: He is not ill-appearing or diaphoretic  HENT:      Head: Normocephalic and atraumatic  Right Ear: External ear normal       Left Ear: External ear normal       Mouth/Throat:      Pharynx: Oropharynx is clear  No oropharyngeal exudate        Comments: ET tube in place  Eyes:      General: No scleral icterus  Right eye: No discharge  Left eye: No discharge  Conjunctiva/sclera: Conjunctivae normal    Cardiovascular:      Rate and Rhythm: Normal rate and regular rhythm  Heart sounds: No murmur  No friction rub  No gallop  Pulmonary:      Effort: Pulmonary effort is normal       Breath sounds: Rhonchi present  No wheezing or rales  Abdominal:      General: Bowel sounds are normal       Palpations: Abdomen is soft  Tenderness: There is no abdominal tenderness  There is no guarding or rebound  Genitourinary:     Penis: Normal        Comments: Urinary catheter in place  Musculoskeletal:      Right lower leg: No edema  Left lower leg: No edema  Skin:     General: Skin is warm and dry  Capillary Refill: Capillary refill takes less than 2 seconds  Neurological:      Comments: Unable to evaluate RASS -4         Laboratory and Diagnostics:  Results from last 7 days   Lab Units 01/13/21  2341 01/13/21  1151 01/13/21  0757 01/12/21  1609 01/12/21  0352 01/11/21  0425 01/10/21  1250 01/10/21  0931  01/10/21  0853   WBC Thousand/uL  --  70 15* 84 73* 110 11* 105 45* 65 14*  --   --   --  50 92*   HEMOGLOBIN g/dL 6 7* 5 8* 6 8* 7 8* 7 5* 8 7*  --   --   --  9 1*   I STAT HEMOGLOBIN g/dl  --   --   --   --   --   --   --  8 2*   < >  --    HEMATOCRIT % 21 5* 19 3* 21 9* 25 7* 23 9* 26 9*  --   --   --  26 5*   HEMATOCRIT, ISTAT %  --   --   --   --   --   --   --  24*   < >  --    PLATELETS Thousands/uL  --  293 318 359 327 237 220  --   --  241   NEUTROS PCT %  --   --   --   --  11*  --   --   --   --   --    BANDS PCT %  --   --   --   --   --  2  --   --   --  1   MONOS PCT %  --   --   --   --  0*  --   --   --   --   --    MONO PCT %  --   --  1* 1*  --  5  --   --   --  1*    < > = values in this interval not displayed       Results from last 7 days   Lab Units 01/13/21  2340 01/13/21  1121 01/13/21  0511 01/12/21  0352 01/12/21  0025 01/11/21  1546 01/11/21  0800 01/11/21  0425  01/10/21  1250  01/10/21  0852   SODIUM mmol/L 133* 132* 133* 129* 127* 126* 127*  --    < > 121*  --  122*   POTASSIUM mmol/L 4 9 4 8 4 4 4 5 5 1 4 2 4 1  --    < > 4 4  --  4 5   CHLORIDE mmol/L 102 101 99* 97* 94* 93* 94*  --    < > 88*  --  87*   CO2 mmol/L 25 26 27 25 24 22 24  --    < > 24  --  26   CO2, I-STAT   --   --   --   --   --   --   --   --   --   --    < >  --    ANION GAP mmol/L 6 5 7 7 9 11 9  --    < > 9  --  9   BUN mg/dL 22 20 23 18 19 20 21  --    < > 26*  --  28*   CREATININE mg/dL 0 71 0 77 0 72 0 75 0 81 0 88 0 78  --    < > 1 13  --  1 22*   CALCIUM mg/dL 7 7* 8 0* 7 6* 8 3 8 4 8 5 8 5  --    < > 8 4  --  8 8   GLUCOSE RANDOM mg/dL 159* 120 118 150* 160* 176* 153*  --    < > 143*  --  127   ALT U/L  --  134* 124* 162*  --   --   --  73  --  51  --  45   AST U/L  --  85* 103* 174*  --   --   --  100*  --  53*  --  50*   ALK PHOS U/L  --  137* 132* 133*  --   --   --  107  --  116  --  103 8   ALBUMIN g/dL  --  2 3* 2 2* 2 5*  --   --   --  2 5*  --  2 7*  --  3 7   TOTAL BILIRUBIN mg/dL  --  2 85* 2 27* 2 22*  --   --   --  1 49*  --  1 97*  --  2 77*    < > = values in this interval not displayed       Results from last 7 days   Lab Units 01/13/21  0511 01/11/21  0425 01/10/21  0852   MAGNESIUM mg/dL 2 0 2 0 1 9   PHOSPHORUS mg/dL  --  3 4  --       Results from last 7 days   Lab Units 01/13/21  2340 01/10/21  0852   INR  1 61* 1 18*   PTT seconds  --  35*      Results from last 7 days   Lab Units 01/14/21  0254 01/13/21  2340 01/10/21  0852   TROPONIN I ng/mL 0 28* 0 34* <0 03     Results from last 7 days   Lab Units 01/10/21  0852   LACTIC ACID mmol/L 0 7     ABG:  Results from last 7 days   Lab Units 01/13/21  0347   PH ART  7 352   PCO2 ART mm Hg 45 8*   PO2 ART mm Hg 75 0   HCO3 ART mmol/L 24 8   BASE EXC ART mmol/L -0 7   ABG SOURCE  Radial, Right     VBG:  Results from last 7 days   Lab Units 01/13/21  0347   ABG SOURCE  Radial, Right     Results from last 7 days Lab Units 01/13/21  0757 01/11/21  0425 01/10/21  1250 01/10/21  0852   PROCALCITONIN ng/ml 0 12 0 14 0 21 0 26*       Micro  Results from last 7 days   Lab Units 01/13/21  0758 01/10/21  1423 01/10/21  1251 01/10/21  1012 01/10/21  0852   BLOOD CULTURE   --  No Growth at 72 hrs  No Growth at 72 hrs  No Growth at 72 hrs  No Growth at 72 hrs  GRAM STAIN RESULT  No Epithelial cells per low power field  No Polys or Bacteria seen  --   --   --   --        EKG:   Imaging: I have personally reviewed pertinent reports  Less ground glass opacities on L upper lung field than 2 days ago    Intake and Output  I/O       01/12 0701 - 01/13 0700 01/13 0701 - 01/14 0700    P  O   0    I V  (mL/kg) 1433 2 622 4 (7 5)    Blood  443 8    NG/GT 30 120    IV Piggyback 800 150    Feedings  250    Total Intake(mL/kg) 2263 2 1586 1 (19 2)    Urine (mL/kg/hr) 1250 1915 (1)    Emesis/NG output 0     Total Output 1250 1915    Net +1013 2 -328 9              UOP: 52 ml/hr     Height and Weights   Height: 5' 6" (167 6 cm)(most recent ht in chart from 11/10/20 encounter)  IBW: 63 8 kg  Body mass index is 29 44 kg/m²  Weight (last 2 days)     Date/Time   Weight    01/13/21 0815   82 7 (182 41)                Nutrition       Diet Orders   (From admission, onward)             Start     Ordered    01/13/21 0723  Diet Enteral/Parenteral; Tube Feeding No Oral Diet; Jevity 1 2 Chad; Continuous; 20  Diet effective now     Question Answer Comment   Diet Type Enteral/Parenteral    Enteral/Parenteral Tube Feeding No Oral Diet    Tube Feeding Formula: Jevity 1 2 Cahd    Bolus/Cyclic/Continuous Continuous    Tube Feeding Goal Rate (mL/hr): 20    RD to adjust diet per protocol? Yes        01/13/21 0726    01/10/21 1624  Room Service  Once     Question:  Type of Service  Answer:  Room Service - Appropriate with Assistance    01/10/21 1623              TF currently running at 20 ml/hr with a goal of 40 ml/hr   Formula: Jevity 1 2 Chad      Active Medications  Scheduled Meds:  Current Facility-Administered Medications   Medication Dose Route Frequency Provider Last Rate    albuterol  2 puff Inhalation Q4H PRN MATHEW Hernandez      ascorbic acid  1,000 mg Per NG Tube Q12H [address] Odalys Mccloud PA-C      aspirin  81 mg Oral Daily MATHEW Hernandez      atorvastatin  40 mg Per NG Tube HS Odalys Mccloud PA-C      bisacodyl  10 mg Rectal Daily PRN MATHEW Parrish      cefepime  1,000 mg Intravenous Q12H Odalys Mccloud PA-C 1,000 mg (01/14/21 0218)    chlorhexidine  15 mL Swish & Spit Q12H [address] MATHEW Padilla      cholecalciferol  2,000 Units Per NG Tube Daily Odalys Mccloud PA-C      dexamethasone  0 1 mg/kg Intravenous Q12H MATHEW Hernandez      dextromethorphan-guaiFENesin  10 mL Oral Q4H PRN Shane Quinn PA-C      enoxaparin  1 mg/kg Subcutaneous Q12H [address] [facility],       famotidine  20 mg Per NG Tube BID MATHEW Amos      fentanyl citrate (PF)  50 mcg Intravenous Q1H PRN MATHEW Amos      fluticasone  2 spray Nasal Daily PRN MATHEW Hernandez      folic acid  1 mg Oral Daily Kassandra Whitehead MD      glycerin-hypromellose-  1 drop Both Eyes Q3H PRN Scottie Cary MD      levalbuterol  1 25 mg Nebulization TID Odalys Mccloud PA-C      melatonin  6 mg Oral HS MATHEW Hernandez      montelukast  10 mg Oral Daily MATHEW Hernandez      zinc sulfate  220 mg Oral Daily MATHEW Hernandez      Followed by   Tamanna Freeman ON 1/17/2021] multivitamin-minerals  1 tablet Oral Daily MATHEW Hernandez      polyethylene glycol  17 g Oral Daily Lutricia Galeazzi, PA-C      propofol  5-50 mcg/kg/min Intravenous Titrated Odalys Mccloud PA-C 50 mcg/kg/min (01/14/21 0128)    remdesivir  100 mg Intravenous Q24H MATHEW Hernandez 0 mg (01/12/21 0800)    senna-docusate sodium  1 tablet Oral  Radha Alex Hunt PA-C      sodium chloride  4 mL Nebulization TID Isma ALISHA Hernandez      sterile water           thiamine  100 mg Per NG Tube Daily Lukefelicitykirstin Hernandez PA-C      vancomycin  20 mg/kg (Adjusted) Intravenous Q12H Carsonnikhilfelicitykirstin Hernandez PA-C 1,500 mg (01/13/21 0814)     Continuous Infusions:  propofol, 5-50 mcg/kg/min, Last Rate: 50 mcg/kg/min (01/14/21 0128)      PRN Meds:   albuterol, 2 puff, Q4H PRN  bisacodyl, 10 mg, Daily PRN  dextromethorphan-guaiFENesin, 10 mL, Q4H PRN  fentanyl citrate (PF), 50 mcg, Q1H PRN  fluticasone, 2 spray, Daily PRN  glycerin-hypromellose-, 1 drop, Q3H PRN        Allergies   Allergies   Allergen Reactions    Codeine Other (See Comments)     agitated    Sulfamethoxazole-Trimethoprim GI Intolerance and Abdominal Pain     upset stomach     ---------------------------------------------------------------------------------------  Advance Directive and Living Will: Yes    Power of :    POLST:    ---------------------------------------------------------------------------------------  Care Time Delivered:       Gisella Earl,       Portions of the record may have been created with voice recognition software  Occasional wrong word or "sound a like" substitutions may have occurred due to the inherent limitations of voice recognition software    Read the chart carefully and recognize, using context, where substitutions have occurred

## 2021-01-14 NOTE — PHYSICAL THERAPY NOTE
Physical Therapy Cancellation Note      Attempted to see pt for continued PT intervention but April NSG states pt is not appropriate due to continued intubation and medical status  Will follow and continue PT as appropriate      Deloris Galan, PT

## 2021-01-14 NOTE — PROGRESS NOTES
Vancomycin IV Pharmacy-to-Dose Consultation    Nuvia Blanco is a 78 y o  male who is currently receiving Vancomycin IV with management by the Pharmacy Consult service  Assessment/Plan:  The patient was reviewed  Renal function is stable and no signs or symptoms of nephrotoxicity and/or infusion reactions were documented in the chart  Trough today was drawn approximately 30 minutes early and is 17 5  Extrapolated trough is 16 1 and therapeutic  Based on todays assessment, continue current vancomycin (day # 2) dosing of 1500 mg every 12 hours, with a plan for trough to be drawn at 1630 on 1/18  We will continue to follow the patients culture results and clinical progress daily      Jose Jordan, Pharmacist

## 2021-01-14 NOTE — CASE MANAGEMENT
LOS: 4 DAYS  PATIENT IS NOT A BUNDLE  PATIENT IS NOT A READMISSION  UNPLANNED RISK OF READMISSION: 19    CM called and spoke to patient's son Roberta Tran via mobile phone to introduce self, explain role, complete CM assessment, and discuss DC planning as patient is COVID + and intubated at this time  Patient resides with his spouse in a 1st floor apartment in [place]  There are a few CHRISTOPHER and patient is able to navigate his home without concern  Patient is totally independent with ADLs and does not utilize any DME  Patient does not have history of VNA/STR  Patient utilizes [address] or [employer] in [place]; has Rx plan and is able toa fford all copays  Patient sees Psychologist Dr Fredi Carvalho on [address] [facility] with his spouse  His spouse suffered a TBI as the result of a MVA in 1991 and patient helps care for her  Patient does not have history of IP [employer] [facility]ending [facility] to [facility] admission  Patient's son stated that patient "drinks beer with his buddies" but doesn't think this negatively impact patient in any way or that his consumption is concerning  Patient receives SSI and a pension and he does drive  His PCP is Dr Peter Gonzalez  CM explained that once patient is more medically stable that we would get recommendations from the entire team (including PT and OT) and make discharge plans at this point  Family expressed understanding of this  CM Dept will continue to follow  CM reviewed discharge planning process including the following: identifying caregivers at home, preference for d/c planning needs, availability of treatment team to discuss questions or concerns patient and/or family may have regarding diagnosis, plan of care, old or new medications and discharge planning   CM will continue to follow for care coordination and update assessment as appropriate

## 2021-01-15 ENCOUNTER — APPOINTMENT (INPATIENT)
Dept: NON INVASIVE DIAGNOSTICS | Facility: HOSPITAL | Age: 71
DRG: 207 | End: 2021-01-15
Payer: MEDICARE

## 2021-01-15 PROBLEM — N30.00 ACUTE CYSTITIS WITHOUT HEMATURIA: Status: ACTIVE | Noted: 2021-01-15

## 2021-01-15 PROBLEM — I49.5 SINUS BRADY-TACHY SYNDROME (HCC): Status: ACTIVE | Noted: 2021-01-15

## 2021-01-15 PROBLEM — R00.1 SINUS BRADYCARDIA BY ELECTROCARDIOGRAM: Status: ACTIVE | Noted: 2021-01-15

## 2021-01-15 PROBLEM — R33.9 URINARY RETENTION: Chronic | Status: RESOLVED | Noted: 2020-03-06 | Resolved: 2021-01-15

## 2021-01-15 PROBLEM — D64.9 ANEMIA: Status: ACTIVE | Noted: 2021-01-15

## 2021-01-15 PROBLEM — R74.01 TRANSAMINITIS: Status: ACTIVE | Noted: 2021-01-15

## 2021-01-15 LAB
ABO GROUP BLD BPU: NORMAL
ABO GROUP BLD BPU: NORMAL
ALBUMIN SERPL BCP-MCNC: 2.1 G/DL (ref 3.5–5)
ALP SERPL-CCNC: 116 U/L (ref 46–116)
ALT SERPL W P-5'-P-CCNC: 94 U/L (ref 12–78)
ANION GAP SERPL CALCULATED.3IONS-SCNC: 5 MMOL/L (ref 4–13)
ANISOCYTOSIS BLD QL SMEAR: PRESENT
ARTERIAL PATENCY WRIST A: YES
AST SERPL W P-5'-P-CCNC: 36 U/L (ref 5–45)
BACTERIA BLD CULT: NORMAL
BACTERIA SPT RESP CULT: NO GROWTH
BASE EXCESS BLDA CALC-SCNC: 0.4 MMOL/L
BASOPHILS # BLD MANUAL: 0 THOUSAND/UL (ref 0–0.1)
BASOPHILS NFR MAR MANUAL: 0 % (ref 0–1)
BILIRUB DIRECT SERPL-MCNC: 1.32 MG/DL (ref 0–0.2)
BILIRUB SERPL-MCNC: 1.75 MG/DL (ref 0.2–1)
BPU ID: NORMAL
BPU ID: NORMAL
BUN SERPL-MCNC: 18 MG/DL (ref 5–25)
CALCIUM SERPL-MCNC: 7.9 MG/DL (ref 8.3–10.1)
CHLORIDE SERPL-SCNC: 105 MMOL/L (ref 100–108)
CO2 SERPL-SCNC: 26 MMOL/L (ref 21–32)
CREAT SERPL-MCNC: 0.65 MG/DL (ref 0.6–1.3)
CROSSMATCH: NORMAL
CROSSMATCH: NORMAL
CRP SERPL QL: 145 MG/L
D DIMER PPP FEU-MCNC: 12.18 UG/ML FEU
EOSINOPHIL # BLD MANUAL: 0 THOUSAND/UL (ref 0–0.4)
EOSINOPHIL NFR BLD MANUAL: 0 % (ref 0–6)
ERYTHROCYTE [DISTWIDTH] IN BLOOD BY AUTOMATED COUNT: 19.5 % (ref 11.6–15.1)
FERRITIN SERPL-MCNC: 1842 NG/ML (ref 8–388)
GFR SERPL CREATININE-BSD FRML MDRD: 100 ML/MIN/1.73SQ M
GLUCOSE SERPL-MCNC: 128 MG/DL (ref 65–140)
GLUCOSE SERPL-MCNC: 159 MG/DL (ref 65–140)
GLUCOSE SERPL-MCNC: 161 MG/DL (ref 65–140)
GLUCOSE SERPL-MCNC: 94 MG/DL (ref 65–140)
GRAM STN SPEC: NORMAL
GRAM STN SPEC: NORMAL
HCO3 BLDA-SCNC: 25.8 MMOL/L (ref 22–28)
HCT VFR BLD AUTO: 23.7 % (ref 36.5–49.3)
HGB BLD-MCNC: 7.2 G/DL (ref 12–17)
HOROWITZ INDEX BLDA+IHG-RTO: 80 MM[HG]
I-TIME: 1
LYMPHOCYTES # BLD AUTO: 71.76 THOUSAND/UL (ref 0.6–4.47)
LYMPHOCYTES # BLD AUTO: 83 % (ref 14–44)
MAGNESIUM SERPL-MCNC: 2.5 MG/DL (ref 1.6–2.6)
MCH RBC QN AUTO: 30.6 PG (ref 26.8–34.3)
MCHC RBC AUTO-ENTMCNC: 30.4 G/DL (ref 31.4–37.4)
MCV RBC AUTO: 101 FL (ref 82–98)
MONOCYTES # BLD AUTO: 0 THOUSAND/UL (ref 0–1.22)
MONOCYTES NFR BLD: 0 % (ref 4–12)
NEUTROPHILS # BLD MANUAL: 14.7 THOUSAND/UL (ref 1.85–7.62)
NEUTS SEG NFR BLD AUTO: 17 % (ref 43–75)
NRBC BLD AUTO-RTO: 0 /100 WBCS
O2 CT BLDA-SCNC: 11.2 ML/DL (ref 16–23)
OXYHGB MFR BLDA: 91.7 % (ref 94–97)
PCO2 BLDA: 45.3 MM HG (ref 36–44)
PEEP RESPIRATORY: 10 CM[H2O]
PH BLDA: 7.37 [PH] (ref 7.35–7.45)
PHOSPHATE SERPL-MCNC: 3.5 MG/DL (ref 2.3–4.1)
PLATELET # BLD AUTO: 276 THOUSANDS/UL (ref 149–390)
PLATELET BLD QL SMEAR: ADEQUATE
PMV BLD AUTO: 10 FL (ref 8.9–12.7)
PO2 BLDA: 75.3 MM HG (ref 75–129)
POLYCHROMASIA BLD QL SMEAR: PRESENT
POTASSIUM SERPL-SCNC: 4.7 MMOL/L (ref 3.5–5.3)
PRESSURE CONTROL: 14
PROT SERPL-MCNC: 6.1 G/DL (ref 6.4–8.2)
RBC # BLD AUTO: 2.35 MILLION/UL (ref 3.88–5.62)
SMUDGE CELLS BLD QL SMEAR: PRESENT
SODIUM SERPL-SCNC: 136 MMOL/L (ref 136–145)
SPECIMEN SOURCE: ABNORMAL
TOTAL CELLS COUNTED SPEC: 100
TSH SERPL DL<=0.05 MIU/L-ACNC: 0.33 UIU/ML (ref 0.36–3.74)
UNIT DISPENSE STATUS: NORMAL
UNIT DISPENSE STATUS: NORMAL
UNIT PRODUCT CODE: NORMAL
UNIT PRODUCT CODE: NORMAL
UNIT RH: NORMAL
UNIT RH: NORMAL
VENT AC: 14
VENT- AC: AC
WBC # BLD AUTO: 86.46 THOUSAND/UL (ref 4.31–10.16)

## 2021-01-15 PROCEDURE — 86140 C-REACTIVE PROTEIN: CPT | Performed by: INTERNAL MEDICINE

## 2021-01-15 PROCEDURE — 94003 VENT MGMT INPAT SUBQ DAY: CPT

## 2021-01-15 PROCEDURE — 94762 N-INVAS EAR/PLS OXIMTRY CONT: CPT

## 2021-01-15 PROCEDURE — 93306 TTE W/DOPPLER COMPLETE: CPT

## 2021-01-15 PROCEDURE — 84443 ASSAY THYROID STIM HORMONE: CPT | Performed by: INTERNAL MEDICINE

## 2021-01-15 PROCEDURE — 94760 N-INVAS EAR/PLS OXIMETRY 1: CPT

## 2021-01-15 PROCEDURE — 82805 BLOOD GASES W/O2 SATURATION: CPT | Performed by: NURSE PRACTITIONER

## 2021-01-15 PROCEDURE — 84100 ASSAY OF PHOSPHORUS: CPT | Performed by: INTERNAL MEDICINE

## 2021-01-15 PROCEDURE — 82948 REAGENT STRIP/BLOOD GLUCOSE: CPT

## 2021-01-15 PROCEDURE — 82728 ASSAY OF FERRITIN: CPT | Performed by: INTERNAL MEDICINE

## 2021-01-15 PROCEDURE — 85379 FIBRIN DEGRADATION QUANT: CPT | Performed by: INTERNAL MEDICINE

## 2021-01-15 PROCEDURE — 94640 AIRWAY INHALATION TREATMENT: CPT

## 2021-01-15 PROCEDURE — 80076 HEPATIC FUNCTION PANEL: CPT | Performed by: INTERNAL MEDICINE

## 2021-01-15 PROCEDURE — 84439 ASSAY OF FREE THYROXINE: CPT | Performed by: INTERNAL MEDICINE

## 2021-01-15 PROCEDURE — 99223 1ST HOSP IP/OBS HIGH 75: CPT | Performed by: INTERNAL MEDICINE

## 2021-01-15 PROCEDURE — 99291 CRITICAL CARE FIRST HOUR: CPT | Performed by: INTERNAL MEDICINE

## 2021-01-15 PROCEDURE — 85027 COMPLETE CBC AUTOMATED: CPT | Performed by: INTERNAL MEDICINE

## 2021-01-15 PROCEDURE — 93005 ELECTROCARDIOGRAM TRACING: CPT

## 2021-01-15 PROCEDURE — 83735 ASSAY OF MAGNESIUM: CPT | Performed by: INTERNAL MEDICINE

## 2021-01-15 PROCEDURE — 36600 WITHDRAWAL OF ARTERIAL BLOOD: CPT

## 2021-01-15 PROCEDURE — 93306 TTE W/DOPPLER COMPLETE: CPT | Performed by: INTERNAL MEDICINE

## 2021-01-15 PROCEDURE — 85007 BL SMEAR W/DIFF WBC COUNT: CPT | Performed by: INTERNAL MEDICINE

## 2021-01-15 PROCEDURE — 87081 CULTURE SCREEN ONLY: CPT | Performed by: INTERNAL MEDICINE

## 2021-01-15 PROCEDURE — 80048 BASIC METABOLIC PNL TOTAL CA: CPT | Performed by: INTERNAL MEDICINE

## 2021-01-15 PROCEDURE — 94150 VITAL CAPACITY TEST: CPT

## 2021-01-15 RX ORDER — MIDAZOLAM HYDROCHLORIDE 2 MG/2ML
2 INJECTION, SOLUTION INTRAMUSCULAR; INTRAVENOUS ONCE
Status: COMPLETED | OUTPATIENT
Start: 2021-01-15 | End: 2021-01-15

## 2021-01-15 RX ORDER — LOSARTAN POTASSIUM 50 MG/1
50 TABLET ORAL DAILY
Status: DISCONTINUED | OUTPATIENT
Start: 2021-01-16 | End: 2021-01-16

## 2021-01-15 RX ORDER — AMLODIPINE BESYLATE 5 MG/1
5 TABLET ORAL EVERY EVENING
Status: DISCONTINUED | OUTPATIENT
Start: 2021-01-15 | End: 2021-01-19

## 2021-01-15 RX ORDER — FENTANYL CITRATE-0.9 % NACL/PF 10 MCG/ML
100 PLASTIC BAG, INJECTION (ML) INTRAVENOUS CONTINUOUS
Status: DISCONTINUED | OUTPATIENT
Start: 2021-01-15 | End: 2021-01-17

## 2021-01-15 RX ORDER — HYDROMORPHONE HCL/PF 1 MG/ML
1 SYRINGE (ML) INJECTION ONCE
Status: COMPLETED | OUTPATIENT
Start: 2021-01-15 | End: 2021-01-15

## 2021-01-15 RX ADMIN — FAMOTIDINE 20 MG: 40 POWDER, FOR SUSPENSION ORAL at 18:25

## 2021-01-15 RX ADMIN — POLYETHYLENE GLYCOL 3350 17 G: 17 POWDER, FOR SOLUTION ORAL at 08:09

## 2021-01-15 RX ADMIN — LEVALBUTEROL HYDROCHLORIDE 1.25 MG: 1.25 SOLUTION, CONCENTRATE RESPIRATORY (INHALATION) at 19:53

## 2021-01-15 RX ADMIN — ZINC SULFATE 220 MG (50 MG) CAPSULE 220 MG: CAPSULE at 08:10

## 2021-01-15 RX ADMIN — PROPOFOL 50 MCG/KG/MIN: 10 INJECTION, EMULSION INTRAVENOUS at 03:39

## 2021-01-15 RX ADMIN — FOLIC ACID 1 MG: 1 TABLET ORAL at 08:10

## 2021-01-15 RX ADMIN — ENOXAPARIN SODIUM 80 MG: 80 INJECTION SUBCUTANEOUS at 21:05

## 2021-01-15 RX ADMIN — ATORVASTATIN CALCIUM 40 MG: 40 TABLET, FILM COATED ORAL at 21:07

## 2021-01-15 RX ADMIN — FENTANYL CITRATE 50 MCG: 50 INJECTION INTRAMUSCULAR; INTRAVENOUS at 00:17

## 2021-01-15 RX ADMIN — MONTELUKAST SODIUM 10 MG: 10 TABLET, FILM COATED ORAL at 08:10

## 2021-01-15 RX ADMIN — VANCOMYCIN HYDROCHLORIDE 1500 MG: 5 INJECTION, POWDER, LYOPHILIZED, FOR SOLUTION INTRAVENOUS at 04:51

## 2021-01-15 RX ADMIN — SODIUM CHLORIDE SOLN NEBU 3% 4 ML: 3 NEBU SOLN at 13:23

## 2021-01-15 RX ADMIN — VANCOMYCIN HYDROCHLORIDE 1500 MG: 5 INJECTION, POWDER, LYOPHILIZED, FOR SOLUTION INTRAVENOUS at 18:19

## 2021-01-15 RX ADMIN — ENOXAPARIN SODIUM 80 MG: 80 INJECTION SUBCUTANEOUS at 08:08

## 2021-01-15 RX ADMIN — SODIUM CHLORIDE SOLN NEBU 3% 4 ML: 3 NEBU SOLN at 19:53

## 2021-01-15 RX ADMIN — MIDAZOLAM HYDROCHLORIDE 2 MG: 1 INJECTION, SOLUTION INTRAMUSCULAR; INTRAVENOUS at 16:48

## 2021-01-15 RX ADMIN — FAMOTIDINE 20 MG: 40 POWDER, FOR SUSPENSION ORAL at 08:08

## 2021-01-15 RX ADMIN — PROPOFOL 45 MCG/KG/MIN: 10 INJECTION, EMULSION INTRAVENOUS at 10:19

## 2021-01-15 RX ADMIN — Medication 25 MCG/HR: at 10:14

## 2021-01-15 RX ADMIN — SODIUM CHLORIDE SOLN NEBU 3% 4 ML: 3 NEBU SOLN at 07:15

## 2021-01-15 RX ADMIN — CHLORHEXIDINE GLUCONATE 0.12% ORAL RINSE 15 ML: 1.2 LIQUID ORAL at 21:07

## 2021-01-15 RX ADMIN — THIAMINE HCL TAB 100 MG 100 MG: 100 TAB at 08:10

## 2021-01-15 RX ADMIN — FENTANYL CITRATE 50 MCG: 50 INJECTION INTRAMUSCULAR; INTRAVENOUS at 08:04

## 2021-01-15 RX ADMIN — DEXAMETHASONE SODIUM PHOSPHATE 8.5 MG: 10 INJECTION, SOLUTION INTRAMUSCULAR; INTRAVENOUS at 08:08

## 2021-01-15 RX ADMIN — ASPIRIN 81 MG: 81 TABLET ORAL at 08:11

## 2021-01-15 RX ADMIN — DOCUSATE SODIUM AND SENNOSIDES 1 TABLET: 8.6; 5 TABLET ORAL at 21:05

## 2021-01-15 RX ADMIN — CHLORHEXIDINE GLUCONATE 0.12% ORAL RINSE 15 ML: 1.2 LIQUID ORAL at 08:08

## 2021-01-15 RX ADMIN — MELATONIN 6 MG: at 21:46

## 2021-01-15 RX ADMIN — DEXAMETHASONE SODIUM PHOSPHATE 8.5 MG: 10 INJECTION, SOLUTION INTRAMUSCULAR; INTRAVENOUS at 21:05

## 2021-01-15 RX ADMIN — LEVALBUTEROL HYDROCHLORIDE 1.25 MG: 1.25 SOLUTION, CONCENTRATE RESPIRATORY (INHALATION) at 13:23

## 2021-01-15 RX ADMIN — Medication 2000 UNITS: at 08:10

## 2021-01-15 RX ADMIN — MIDAZOLAM 2 MG: 1 INJECTION INTRAMUSCULAR; INTRAVENOUS at 08:05

## 2021-01-15 RX ADMIN — LEVALBUTEROL HYDROCHLORIDE 1.25 MG: 1.25 SOLUTION, CONCENTRATE RESPIRATORY (INHALATION) at 07:15

## 2021-01-15 RX ADMIN — CEFEPIME HYDROCHLORIDE 1000 MG: 1 INJECTION, POWDER, FOR SOLUTION INTRAMUSCULAR; INTRAVENOUS at 02:56

## 2021-01-15 RX ADMIN — SERTRALINE HYDROCHLORIDE 50 MG: 50 TABLET ORAL at 10:35

## 2021-01-15 RX ADMIN — PROPOFOL 50 MCG/KG/MIN: 10 INJECTION, EMULSION INTRAVENOUS at 09:09

## 2021-01-15 RX ADMIN — PROPOFOL 25 MCG/KG/MIN: 10 INJECTION, EMULSION INTRAVENOUS at 16:49

## 2021-01-15 RX ADMIN — HYDROMORPHONE HYDROCHLORIDE 1 MG: 1 INJECTION, SOLUTION INTRAMUSCULAR; INTRAVENOUS; SUBCUTANEOUS at 16:48

## 2021-01-15 RX ADMIN — INSULIN LISPRO 1 UNITS: 100 INJECTION, SOLUTION INTRAVENOUS; SUBCUTANEOUS at 00:35

## 2021-01-15 RX ADMIN — OXYCODONE HYDROCHLORIDE AND ACETAMINOPHEN 1000 MG: 500 TABLET ORAL at 21:05

## 2021-01-15 RX ADMIN — MIDAZOLAM 2 MG: 1 INJECTION INTRAMUSCULAR; INTRAVENOUS at 13:10

## 2021-01-15 RX ADMIN — FENTANYL CITRATE 50 MCG: 50 INJECTION INTRAMUSCULAR; INTRAVENOUS at 13:53

## 2021-01-15 RX ADMIN — AMLODIPINE BESYLATE 5 MG: 5 TABLET ORAL at 21:46

## 2021-01-15 RX ADMIN — FENTANYL CITRATE 50 MCG: 50 INJECTION INTRAMUSCULAR; INTRAVENOUS at 15:42

## 2021-01-15 RX ADMIN — INSULIN LISPRO 1 UNITS: 100 INJECTION, SOLUTION INTRAVENOUS; SUBCUTANEOUS at 05:36

## 2021-01-15 RX ADMIN — MIDAZOLAM 2 MG: 1 INJECTION INTRAMUSCULAR; INTRAVENOUS at 21:30

## 2021-01-15 RX ADMIN — CEFEPIME HYDROCHLORIDE 1000 MG: 1 INJECTION, POWDER, FOR SOLUTION INTRAMUSCULAR; INTRAVENOUS at 15:34

## 2021-01-15 RX ADMIN — OXYCODONE HYDROCHLORIDE AND ACETAMINOPHEN 1000 MG: 500 TABLET ORAL at 08:10

## 2021-01-15 NOTE — ASSESSMENT & PLAN NOTE
· Patient was noted to have bradycardia with HR ranging from 45 -60 in the last 24 hours  · Patient presented with NSR with HR of 69  On ECG at admission  · ECG on 01/14/2021 showed sinus bradycardia with long Q T interval  HR 55 and Qtc of 669  · Patient is on fentanyl and propofol infusions for sedation purposes secondary to acute respiratory failure with hypoxia on AC mode ventilator  Plan:  · Consider holding propofol and monitor for improvement  · Consider magnesium if symptoms persist in the setting of long QT prolongation  · Consider temporary transcutaneous pacing if refractory to conservative measures    · Check TSH

## 2021-01-15 NOTE — ASSESSMENT & PLAN NOTE
· Patient drinks 3-4 beers a day about 4 times a week  Plan:  · ASTON protocol p r n  · Thiamine 100 mg q d  Jacobannetta Given Per NG tube  · Encourage cessation of alcohol use

## 2021-01-15 NOTE — ASSESSMENT & PLAN NOTE
· Patient was diagnosed on 01/04/2021 after 2 days symptoms  His wife also tested positive  Patient presented for [facility] when he was found to be severely hypotensive and hypoxic  Patient was immediately referred to the ED where he was started on HFNC and fluid boluses  · Currently on severe pathway for COVID-19 protocol for management and on the ventilator      Plan:  · Continue management per CCU Team

## 2021-01-15 NOTE — ASSESSMENT & PLAN NOTE
· Patient has a history of urinary retention secondary to BPH and self catheterizations at home  Follows up with Urology  · Adequate urinary output of 1 2 L in the last 24 hours with Hillman  · Unlikely cause of reflex bradycardia with vagal response as urine output is adequate      Plan:  · Strict I's and O's  · Maintain Hillman catheter

## 2021-01-15 NOTE — PHYSICAL THERAPY NOTE
Physical Therapy Cancellation Note      Attempted to see pt for PT intervention but Flakita GONZALEZ reports pt is not appropriate due to continued intubation and overall medical status  Will follow and continue PT as appropriate      Jakub Ogden, PT

## 2021-01-15 NOTE — ASSESSMENT & PLAN NOTE
· Recent echo on 01/15/2021 was significant for PA pressure of 75 mm Hg  · May be secondary or exaggerated by history of DIXIE  Plan:  · Monitor

## 2021-01-15 NOTE — ASSESSMENT & PLAN NOTE
· Patient was noted to have bradycardia with HR ranging from 45 -60 in the last 24 hours  · Patient presented with NSR with HR of 69  On ECG at admission  · ECG on 01/14/2021 showed sinus bradycardia with long Q T interval  HR 55 and Qtc of 669  · Patient is on fentanyl and propofol infusions for sedation purposes secondary to acute respiratory failure with hypoxia on AC mode ventilator  Plan:  · Consider holding propofol and monitor for improvement  · Consider magnesium if symptoms persist in the setting of long QT prolongation  · Consider temporary transcutaneous pacing if refractory to conservative measures

## 2021-01-15 NOTE — OCCUPATIONAL THERAPY NOTE
Occupational Therapy Evaluation     Patient Name: Jerson RAUSCH Date: 1/15/2021  Problem List  Principal Problem:    COVID-19  Active Problems:    Essential hypertension    CLL (chronic lymphocytic leukemia) ([facility] )    Pulmonary hypertension (HCC)    DIXIE not currently treated    Hypercholesterolemia    Mild depression ([facility] )    BPH with obstruction/lower urinary tract symptoms    Alcohol abuse    Acute respiratory failure with hypoxia (HCC)    QT prolongation    Sinus los-tachy syndrome ([facility] )    Past Medical History  Past Medical History:   Diagnosis Date    Anxiety     BPH (benign prostatic hypertrophy)     CLL (chronic lymphocytic leukemia) ([facility] )     2009    Colon polyp     Concussion     Resolved: 08/22/16    Depression     Gastrointestinal hemorrhage     Last assessed: 08/27/13    GERD (gastroesophageal reflux disease)     Hearing loss of aging     Hiatal hernia     History of transfusion     Hyperlipidemia     Hypertension     Iron deficiency anemia     Microscopic hematuria     Last assessed: 06/28/13    OA (osteoarthritis)     right hip    Pneumothorax     Prostatitis     Pulmonary hypertension (HCC)     Seasonal allergies     UTI (urinary tract infection)     in past    Wears glasses      Past Surgical History  Past Surgical History:   Procedure Laterality Date    COLONOSCOPY      CYSTOSCOPY  10/09/2014    Diagnostic    CYSTOSCOPY  06/29/2020    FRACTURE SURGERY      left lower arm    FRACTURE SURGERY      left femur    HERNIA REPAIR      JOINT REPLACEMENT Right     hip    OTHER SURGICAL HISTORY  03/2011    Spinal anesthesia epidural    MN TOTAL HIP ARTHROPLASTY Right 9/29/2017    Procedure: ARTHROPLASTY HIP TOTAL ANTERIOR;  Surgeon: Junior Chilel MD;  Location: [facility];  Service: Orthopedics    TONSILLECTOMY AND ADENOIDECTOMY      TRANSURETHRAL RESECTION OF PROSTATE      x 2    WRIST SURGERY          01/15/21 1115   OT Last Visit   OT Visit Date 01/15/21  (Friday)   Note Type   Note type Evaluation   Cancel Reasons Intubated/sedated   Assessment   Assessment OT orders received and chart review completed  Pt currently not appropriate to participate in OT eval due to medical / respiratory status   Will follow as appropriate and schedule allows      Sanchez Vera OT

## 2021-01-15 NOTE — ASSESSMENT & PLAN NOTE
· POA  Patient presented with acute respiratory failure with hypoxia secondary to COVID 9 infection  · Patient was treated with HFNC and then transitioned to ventilator on [facility] mode  · Currently patient is on ventilator AC mode  14/520/10/60    SpO2 at 100%  · Management by CCU team     Plan:  · Continue management by CCU team

## 2021-01-15 NOTE — CONSULTS
Consult- Jessica Duque 1950, 78 y o  male MRN: [id number]    Unit/Bed#: ICU 08 Encounter: [id number]    Primary Care Provider: Margot Chase MD   Date and time admitted to hospital: 1/10/2021 12:16 PM    Reason For Consultation:   Sinus bradycardia    Assessment/Plan:         Inpatient consult to Cardiology     Date/Time 1/15/2021 2:28 PM     Performed by  Kelin Lundberg MD     Authorized by Fadia Maxwell MD            Sinus los-tachy syndrome [facility])  Assessment & Plan  · Patient was noted to have bradycardia with HR ranging from 45 -60 in the last 24 hours  · Patient presented with NSR with HR of 69  On ECG at admission  · ECG on 01/14/2021 showed sinus bradycardia with long Q T interval  HR 55 and Qtc of 669  · Patient is on fentanyl and propofol infusions for sedation purposes secondary to acute respiratory failure with hypoxia on AC mode ventilator  Plan:  · Consider holding propofol and monitor for improvement  · Consider magnesium if symptoms persist in the setting of long QT prolongation  · Consider temporary transcutaneous pacing if refractory to conservative measures  · Check TSH    QT prolongation  Assessment & Plan  Recent Labs     01/13/21  2325   QTCINT 669     Pt has prolonged QTc interval that was not noted at admission  Plan:  · Avoid using agents that prolong QT interval or interact causing prolongation  · Consider magnesium if symptoms persist   · Considered temporary transcutaneous pacing if refractory  · Monitor    Acute respiratory failure with hypoxia (HCC)  Assessment & Plan  · POA  Patient presented with acute respiratory failure with hypoxia secondary to COVID 9 infection  · Patient was treated with HFNC and then transitioned to ventilator on [facility] mode  · Currently patient is on ventilator AC mode  14/520/10/60    SpO2 at 100%  · Management by CCU team     Plan:  · Continue management by CCU team     * COVID-19  Assessment & Plan  · Patient was diagnosed on 01/04/2021 after 2 days symptoms  His wife also tested positive  Patient presented for [facility] Pe[facility]ody when he was found to be severely hypotensive and hypoxic  Patient was immediately referred to the ED where he was started on HFNC and fluid boluses  · Currently on severe pathway for COVID-19 protocol for management and on the ventilator  Plan:  · Continue management per CCU Team     DIXIE not currently treated  Assessment & Plan  · Polysomnogram very remote and was self-reported to have mild DIXIE currently untreated  · Patient has a history of pulmonary hypertension which may be secondary or exacerbated by DIXIE  · Echo on 01/15/2021 was significant for PA pressure of 75 mm Hg  Plan:  · Patient is intubated on ventilator support  [facility]  Assessment & Plan  · Recent echo on 01/15/2021 was significant for PA pressure of 75 mm Hg  · May be secondary or exaggerated by history of DIXIE  Plan:  · Monitor  BPH with obstruction/lower urinary tract symptoms  Assessment & Plan  · Patient has a history of urinary retention secondary to BPH and self catheterizations at home  Follows up with Urology  · Adequate urinary output of 1 2 L in the last 24 hours with Hillman  · Unlikely cause of reflex bradycardia with vagal response as urine output is adequate  Plan:  · Strict I's and O's  · Maintain Hillman catheter    Alcohol abuse  Assessment & Plan  · Patient drinks 3-4 beers a day about 4 times a week  Plan:  · CIWA protocol p r n  · Thiamine 100 mg q d  Daniel Alhaji Per NG tube  · Encourage cessation of alcohol use  Hypercholesterolemia  Assessment & Plan  Lab Results   Component Value Date    CHOLESTEROL 124 12/31/2019    TRIG 53 12/31/2019    HDL 50 12/31/2019    LDLCALC 63 12/31/2019       CLL (chronic lymphocytic leukemia) ([facility] )  Assessment & Plan  Recent Labs     01/13/21  1151 01/14/21  0558 01/15/21  0324   WBC 70 15* 74 16* 86 46*     · Patient has been in remission  Plan:  · Will continue follow-up with heme Onc and yearly for Venofer infusions  · Monitor CBC for WBC      VTE Prophylaxis: Enoxaparin (Lovenox)  / sequential compression device       Counseling / Coordination of Care Time: 45 minutes  Greater than 50% of total time spent on patient counseling and coordination of care  History of Present Illness:    Laurent Zamora is a 78 y o  male with past medical history of pulmonary hypertension and DIXIE untreated, HTN, HLD, iron deficiency anemia CLL in remission on venofer infusions yearly, BPH with urinary retention, hearing loss, mild depression and insomnia who is originally admitted to the CCU service on 1/10/2021 due to acute respiratory failure with hypoxia and hypotension secondary to COVID-19 infection  Patient had COVID-19 symptoms together with his wife at home on 01/02/2021 and was tested on 01/04/21 and tested positive for COVID  Patient was at home and experiencing decreased appetite however stated that he was trying to keep up with his water intake and Gatorade  He admitted to body aches, malaise, productive cough and shortness of breath  He denied any fever, chills, dizziness or abdominal pain  Patient went to an infusion center to receive bamlanivimab infusion when he was noted to be significantly hypoxic and hypotensive  Patient was immediately referred to [facility]  In the ER patient received IV boluses for hydration and hypertension and was started on HFNC to maintain SpO2 greater than 90%  CXR confirmed bilateral infiltrates consistent with COVID-19 infection  CBC was significant for WBC of 50 92 and HGB of 9 1  Hb baseline is 12  Patient was also noted to be hyponatremic with a sodium of 122, PHAM with a creatinine of 1 2  BNP was mildly elevated at 395 4  Inflammatory markers and propofol were elevated Lactic acid, troponins, CK-MB for within normal limits  ECG was unremarkable with NSR and HR of 69       In the CCU patient continued to progressively decline respiratory wise and was intubated on 01/13/2021 to maintain SpO2 greater than 90  Patient was also noted to have bradycardia with ectopic intermittent ventricular tachyarrhythmias that included short runs of PVCs and VT  Patient was also noted to have QT prolongation with a QTC of 669 with sinus bradycardia on 01/13    Echocardiogram on 01/15/2021 was significant for normal LV with EF of 60%  RV was mildly to moderately dilated  Left atrium was dilated with mild mitral regurg  PA pressure was 75 mm Hg  Review of Systems:    Review of Systems   Reason unable to perform ROS: Patient intubated on ventilator and sedated         Past Medical and Surgical History:     Past Medical History:   Diagnosis Date    Anxiety     BPH (benign prostatic hypertrophy)     CLL [facility]chronic [facility]ocytic leukemia) ([facility] )     2009    Colon polyp     Concussion     Resolved: 08/22/16    Depression     Gastrointestinal hemorrhage     Last assessed: 08/27/13    GERD (gastroesophageal reflux disease)     Hearing loss of aging     Hiatal hernia     History of transfusion     Hyperlipidemia     Hypertension     Iron deficiency anemia     Microscopic hematuria     Last assessed: 06/28/13    OA (osteoarthritis)     right hip    Pneumothorax     Prostatitis     Pulmonary hypertension (HCC)     Seasonal allergies     UTI (urinary tract infection)     in past    Wears glasses        Past Surgical History:   Procedure Laterality Date    COLONOSCOPY      CYSTOSCOPY  10/09/2014    Diagnostic    CYSTOSCOPY  06/29/2020    FRACTURE SURGERY      left lower arm    FRACTURE SURGERY      left femur    HERNIA REPAIR      JOINT REPLACEMENT Right     hip    OTHER SURGICAL HISTORY  03/2011    Spinal anesthesia epidural    LA TOTAL HIP ARTHROPLASTY Right 9/29/2017    Procedure: ARTHROPLASTY HIP TOTAL ANTERIOR;  Surgeon: Surendra Alcantar MD;  Location: [facility];  Service: Orthopedics    TONSILLECTOMY AND ADENOIDECTOMY      TRANSURETHRAL RESECTION OF PROSTATE      x 2    WRIST SURGERY         Meds/Allergies:    PTA meds:   Prior to Admission Medications   Prescriptions Last Dose Informant Patient Reported? Taking? Milk Thistle 1000 MG CAPS  Self Yes No   Sig: Take 250 mg by mouth    Omega-3 Fatty Acids (FISH OIL) 1,000 mg  Self Yes No   Sig: Take by mouth daily   amLODIPine (NORVASC) 5 mg tablet  Self No No   Sig: TAKE ONE TABLET BY MOUTH EVERY EVENING   aspirin 81 MG tablet  Self Yes No   Sig: Take 1 tablet by mouth daily   atorvastatin (LIPITOR) 20 mg tablet  Self No No   Sig: TAKE ONE TABLET BY MOUTH EVERY DAY   carvedilol (COREG) 12 5 mg tablet  Self No No   Sig: TAKE 1 TABLET BY MOUTH TWO TIMES A DAY WITH MEALS   dextromethorphan-guaiFENesin (ROBITUSSIN DM)  mg/5 mL syrup   No No   Sig: Take 5 mL by mouth 3 (three) times a day as needed for cough   fluticasone (FLONASE) 50 mcg/act nasal spray  Self No No   Si sprays into each nostril daily as needed for allergies   losartan (COZAAR) 100 MG tablet  Self No No   Sig: TAKE ONE TABLET BY MOUTH EVERY DAY   minoxidil (LONITEN) 10 mg tablet   No No   Sig: TAKE ONE TABLET BY MOUTH ONCE EVERY DAY   montelukast (SINGULAIR) 10 mg tablet  Self No No   Sig: Take 1 tablet (10 mg total) by mouth daily   naproxen (EC NAPROSYN) 500 MG EC tablet  Self No No   Sig: Take 1 tablet (500 mg total) by mouth 2 (two) times a day with meals   Patient taking differently: Take 500 mg by mouth as needed    omeprazole (PriLOSEC) 40 MG capsule  Self No No   Sig: TAKE ONE CAPSULE BY MOUTH EVERY DAY   sertraline (ZOLOFT) 50 mg tablet  Self No No   Sig: TAKE ONE TABLET BY MOUTH EVERY DAY   tamsulosin (FLOMAX) 0 4 mg 2021 at Unknown time Self No Yes   Sig: Take 1 capsule (0 4 mg total) by mouth daily with dinner CORRECTED SCRIPT! Note change in SIG and QUANTITY  Please process accordingly    Thank you! (20)   zolpidem (AMBIEN) 10 mg tablet Past Week at Unknown time Self No Yes   Sig: TAKE ONE TABLET BY MOUTH AT BEDTIME AS NEEDED SLEEP      Facility-Administered Medications: None       Allergies: Allergies   Allergen Reactions    Codeine Other (See Comments)     agitated    Sulfamethoxazole-Trimethoprim GI Intolerance and Abdominal Pain     upset stomach       Social History:     Marital Status: /Civil Union    Substance Use History:   Social History     Substance and Sexual Activity   Alcohol Use Yes    Alcohol/week: 10 0 standard drinks    Types: 10 Cans of beer per week    Frequency: 4 or more times a week    Drinks per session: 3 or 4     Social History     Tobacco Use   Smoking Status Former Smoker    Packs/day: 1 00    Years: 15     Pack years: 15     Types: Cigarettes    Start date:     Quit date: 1980    Years since quittin 3   Smokeless Tobacco Never Used     Social History     Substance and Sexual Activity   Drug Use Not Currently    Types: Marijuana       Family History:    Family History   Problem Relation Age of Onset    No Known Problems Mother     Heart attack Father     Diabetes Brother     Prostate cancer Brother        Physical Exam:     Vitals:   Blood Pressure: 118/63 (01/15/21 1400)  Pulse: (!) 50 (01/15/21 1400)  Temperature: (!) 96 °F (35 6 °C) (01/15/21 1000)  Temp Source: Axillary (01/15/21 1000)  Respirations: 14 (01/15/21 1400)  Height: 5' 6" (167 6 cm)(most recent ht in chart from 11/10/20 encounter) (21 0957)  Weight - Scale: 88 8 kg (195 lb 12 3 oz) (01/15/21 0541)  SpO2: 91 % (01/15/21 1400)    Physical Exam  Vitals signs and nursing note reviewed  Constitutional:       General: He is not in acute distress  Appearance: Normal appearance  He is obese  He is not ill-appearing or toxic-appearing  HENT:      Head: Normocephalic and atraumatic  Nose: Nose normal       Mouth/Throat:      Mouth: Mucous membranes are dry  Pharynx: Oropharyngeal exudate present     Eyes: General: No scleral icterus  Right eye: No discharge  Left eye: No discharge  Pupils: Pupils are equal, round, and reactive to light  Neck:      Musculoskeletal: Normal range of motion and neck supple  No neck rigidity or muscular tenderness  Cardiovascular:      Rate and Rhythm: Regular rhythm  Bradycardia present  Pulses: Normal pulses  Heart sounds: Normal heart sounds  No murmur  No friction rub  No gallop  Pulmonary:      Effort: Pulmonary effort is normal  No respiratory distress  Breath sounds: No stridor  Rhonchi and rales present  No wheezing  Abdominal:      General: Bowel sounds are normal       Palpations: Abdomen is soft  Tenderness: There is no abdominal tenderness  There is no guarding or rebound  Genitourinary:     Comments: Hillman in place  Musculoskeletal:         General: No swelling, tenderness or deformity  Right lower leg: No edema  Left lower leg: No edema  Skin:     General: Skin is warm and dry  Capillary Refill: Capillary refill takes less than 2 seconds  Coloration: Skin is not jaundiced  Findings: No lesion or rash  Neurological:      Mental Status: He is alert  Motor: No weakness  Comments: Sedated for intubation on ventilator  Additional Data:     Lab Results: I have personally reviewed pertinent reports  Results from last 7 days   Lab Units 01/15/21  0324  01/14/21  0558   WBC Thousand/uL 86 46*  --  74 16*   HEMOGLOBIN g/dL 7 2*   < > 7 2*   HEMATOCRIT % 23 7*   < > 22 8*   PLATELETS Thousands/uL 276  --  266   NEUTROS PCT %  --   --  9*   LYMPHS PCT %  --   --  90*   LYMPHO PCT % 83*  --   --    MONOS PCT %  --   --  1*   MONO PCT % 0*  --   --    EOS PCT % 0  --  0    < > = values in this interval not displayed       Results from last 7 days   Lab Units 01/15/21  0324  01/10/21  0931   POTASSIUM mmol/L 4 7   < >  --    CHLORIDE mmol/L 105   < >  --    CO2 mmol/L 26   < >  --    CO2, I-STAT mmol/L  --   --  25   BUN mg/dL 18   < >  --    CREATININE mg/dL 0 65   < >  --    CALCIUM mg/dL 7 9*   < >  --    ALK PHOS U/L 116   < >  --    ALT U/L 94*   < >  --    AST U/L 36   < >  --    GLUCOSE, ISTAT mg/dl  --   --  130    < > = values in this interval not displayed  Results from last 7 days   Lab Units 01/13/21  2340   INR  1 61*       Imaging: I have personally reviewed pertinent films in PACS    Xr Chest Portable    Result Date: 1/11/2021  Narrative: CHEST INDICATION:  Decreased oxygen saturation, covid+  COMPARISON:  9/5/2017, CT chest 11/15/2018 EXAM PERFORMED/VIEWS:  XR CHEST PORTABLE FINDINGS: Cardiomediastinal silhouette appears stable  Groundglass opacities throughout the right lung and left mid to lower lung zone  No pneumothorax or pleural effusion  Old right-sided rib fractures  Impression: Bilateral groundglass opacities, right greater than left  Although nonspecific, given clinical scenario, findings are suggestive of Covid-19 viral pneumonia  As per comments in EPIC, findings are concordant with preliminary interpretation provided by the emergency department  Workstation performed: [id number]     Xr Abdomen 1 View Kub    Result Date: 1/12/2021  Narrative: ABDOMEN INDICATION:   sob, abdominal distention  Patient has confirmed COVID-19  COMPARISON:  CT abdomen and pelvis study of January 10, 2020  VIEWS:  AP supine FINDINGS: There is a nonobstructive bowel gas pattern  No discernible free air on this supine study  Upright or left lateral decubitus imaging is more sensitive to detect subtle free air in the appropriate setting  No pathologic calcifications or soft tissue masses  Bilateral groundglass opacities concerning for Covid 19 pneumonia  Elevated left hemidiaphragm presumably due to the recently noted CT findings of splenomegaly  Right total hip arthroplasty  Degenerative changes of the left hip joint and lower lumbar spine       Impression: Unremarkable bowel gas pattern  Bilateral pulmonary infiltrates consistent with Covid 19 pneumonia  Persistent elevation of left hemidiaphragm likely secondary to splenomegaly as seen on the recent CT scan  Workstation performed: [id number]     Xr Chest Portable Icu    Result Date: 1/13/2021  Narrative: CHEST INDICATION:   intubation  Patient has confirmed COVID-19  COMPARISON:  1/12/2021 EXAM PERFORMED/VIEWS:  XR CHEST PORTABLE ICU FINDINGS:  Endotracheal tube tip is 3 8 cm above the mar and nasogastric tube extends below left hemidiaphragm  The cardiac silhouette is without change from the prior study  Persistent bilateral multifocal infiltrates are identified  No pneumothorax  Osseous structures appear within normal limits for patient age  Impression: Endotracheal tube tip is 3 8 cm above the mar Workstation performed: [id number]     Xr Chest Portable Icu    Result Date: 1/12/2021  Narrative: CHEST INDICATION:   sob, increase o2 requirement  Patient has confirmed COVID-19  COMPARISON:  1/10/2021 EXAM PERFORMED/VIEWS:  XR CHEST PORTABLE ICU FINDINGS: Cardiomediastinal silhouette appears unremarkable  There is diffuse patchy airspace disease, worsened  Osseous structures appear within normal limits for patient age  Impression: Diffuse patchy airspace disease, worsened and consistent with the clinical history  Workstation performed: [id number]     Us Right Upper Quadrant    Result Date: 1/12/2021  Narrative: RIGHT UPPER QUADRANT ULTRASOUND INDICATION:     RUQ pain  Patient has confirmed COVID-19  COMPARISON:  None TECHNIQUE:   Real-time ultrasound of the right upper quadrant was performed with a curvilinear transducer with both volumetric sweeps and still imaging techniques  FINDINGS: Limited study as the patient is Covid -19 positive  BILIARY: Gallbladder is underdistended limiting evaluation for wall thickening  There is a 3 mm gallbladder polyp along the anterior wall  There is a negative sonographic Soto's sign  There is no evidence for cholelithiasis  No intrahepatic biliary dilatation  CBD measures 2 mm  No choledocholithiasis  Impression: No evidence for cholelithiasis  Under distention of the gallbladder wall limits evaluation for thickening  3 mm gallbladder polyp  According to current literature guidelines (JACR 2013;10:953-956) small polyps this size are benign and no workup or followup is needed  Workstation performed: [id number]       EKG, Pathology, and Other Studies Reviewed on Admission:   · EKG:  Sinus bradycardia  HR 55  Long QT prolongation    ** Please Note: This note has been constructed using a voice recognition system   **

## 2021-01-15 NOTE — ASSESSMENT & PLAN NOTE
Lab Results   Component Value Date    CHOLESTEROL 124 12/31/2019    TRIG 53 12/31/2019    HDL 50 12/31/2019    LDLCALC 63 12/31/2019

## 2021-01-15 NOTE — PROGRESS NOTES
Vancomycin IV Pharmacy-to-Dose Consultation    Martha Vinson is a 78 y o  male who is currently receiving Vancomycin IV with management by the Pharmacy Consult service  Assessment/Plan:  The patient was reviewed  Renal function is stable and no signs or symptoms of nephrotoxicity and/or infusion reactions were documented in the chart  Based on todays assessment, continue current vancomycin (day # 3) dosing of 1500 mg IV Q12H, with a plan for trough to be drawn at 1630 on 1/18  We will continue to follow the patients culture results and clinical progress daily      Suha Castaneda, Pharmacist

## 2021-01-15 NOTE — ASSESSMENT & PLAN NOTE
· POA  Patient presented with acute respiratory failure with hypoxia secondary to COVID 9 infection  · Patient was treated with HFNC and then transitioned to ventilator on [facility] mode  · Currently patient is on ventilator AC mode  14/520/10/60    SpO2 at 100%  · Management by CCU team     Plan:  · Continue management by CCU team

## 2021-01-15 NOTE — ASSESSMENT & PLAN NOTE
Recent Labs     01/13/21  2325   QTCINT 669     Pt has prolonged QTc interval that was not noted at admission  Plan:  · Avoid using agents that prolong QT interval or interact causing prolongation  · Consider magnesium if symptoms persist   · Considered temporary transcutaneous pacing if refractory    · Monitor

## 2021-01-15 NOTE — PROGRESS NOTES
Daily Progress Note - 102 [facility] Dina Willams 78 y o  male MRN: [id number]  Unit/Bed#: ICU 08 Encounter: [id number]        ----------------------------------------------------------------------------------------  HPI/24hr events: 78year old male admitted due to Covid pneumonia  Intubated 1/13, MV day 3  Overnight, patient had several episodes of ectopy  Longest run was 8 beats of Vtach followed by nasreen  Patient also remains bradycardic this AM      ---------------------------------------------------------------------------------------  SUBJECTIVE  Unable to offer verbal complaints  Able to follow simple commands   Frustrated this AM      Review of Systems   Unable to perform ROS: Intubated     Review of systems was reviewed and negative unless stated above in HPI/24-hour events   ---------------------------------------------------------------------------------------  Assessment and Plan:    Neuro:    Diagnosis: Sedation/Pain  o Plan:   - RASS goal 0 to -1  - Continue propofol @50  - Precedex stopped in the setting of bradycardia   - PRN fentanyl 50mcg   - Versed 2mg q4 PRN   - Continue melatonin HS   Diagnosis: Alcohol abuse   o Plan:   - Continue CIWA protocol   - Continue thiamine/folate   Diagnosis: Depression/Anxiety  o Plan:   - Home meds include: ambien, zoloft   - Restart home meds       CV:    Diagnosis: Normal sinus VT  o Plan:   - Continue telemetry   - Echo pending   - Echo 4/2019 EF 60%   Diagnosis: Pulmonary HTN  o Plan:   - Not currently on outpatient regimen   - No indication for diuresis    Diagnosis: HTN   o Plan:   - Home hypertension medications on hold in the setting of hypotension   - Levophed off  - Maintain MAP >65   - Continue cardio-pulmonary monitoring       Pulm:   Diagnosis: Acute hypoxic respiratory failure   o Plan:   - Likely in the setting of Covid PNA   - Originially presented requiring HFNC and NRB   - Intubated 1/13, MV 3  - Continue severe Covid pathway - Continue Xopenex   - Wean FiO2 as tolerated to maintain SpO2>88%      GI:    Diagnosis: GERD  o Plan:   - Continue Pepcid   - Continue bowel regimen   - RUQ US negative       :    Diagnosis: PHAM   o Plan:   - Strict I/O  - Baseline Cr 0 85-0 87  - Admission Cr 1 22  - Continue to trend BUN/Cr      F/E/N:    Plan:   o Fluids: No maintenance fluids at this time, diurese as needed   o Electrolytes: Will replete as necessary to maintain potassium > 4 0, magnesium > 2 0, and phosphrous > 3 0    o Nutrition: TFs at goal       Heme/Onc:    Diagnosis: CLL  o Plan:   - Heme consulted   - WBC count elevated in the setting of acute infection   - Continue to trend       Endo:    Diagnosis: Hyperglycemia  o Plan:   - Continue SSI  - Blood glucose goal 140-180     ID:    Diagnosis: Covid-19 PNA   o Plan:   - Continue severe pathway   - Remdesivir completed 1/14  - Continue Decadron   - S/p Convalescent plasma 1/11  - Continue multivitamins, atorvastatin, pepcid   - Continue to trend inflammatory markers   - Ferritin: 1842  - D-dimer: 12 18  - CRP: 145  - Procal: 0 06  - Continue cefepime day 3  - Continue vancomycin Day 3  - Blood cultures 1/10 show no growth at 4 days   - Sputum culture 1/13: no growth      MSK/Skin:    Diagnosis: No acute issues   o Plan:   - Frequent repositioning and pressure offloading   - Local wound care as needed       Disposition: Continue Critical Care   Code Status: Level 1 - Full Code  ---------------------------------------------------------------------------------------  ICU CORE MEASURES    Prophylaxis   VTE Pharmacologic Prophylaxis: Enoxaparin (Lovenox)  VTE Mechanical Prophylaxis: sequential compression device  Stress Ulcer Prophylaxis: Famotidine PO    ABCDE Protocol (if indicated)  Plan to perform spontaneous awakening trial today? Yes  Plan to perform spontaneous breathing trial today? No  Obvious barriers to extubation?  Yes  CAM-ICU: Positive    Invasive Devices Review  Invasive Devices     Peripheral Intravenous Line            Peripheral IV 21 Left Forearm 3 days    Peripheral IV 21 Dorsal (posterior); Right Hand 2 days    Long-Dwell Peripheral IV (Midline)  Left Cephalic 1 day          Drain            NG/OG/Enteral Tube Orogastric 18 Fr Right mouth 2 days    Urethral Catheter Latex 16 Fr  1 day          Airway            ETT  Oral;Hi-Lo; Cuffed 8 mm 2 days              Can any invasive devices be discontinued today? No  ---------------------------------------------------------------------------------------  OBJECTIVE    Vitals   Vitals:    01/15/21 0400 01/15/21 0500 01/15/21 0541 01/15/21 0600   BP:       BP Location:       Pulse: (!) 47 (!) 45  (!) 50   Resp: 15 15  16   Temp:       TempSrc:       SpO2: 98% 98%  98%   Weight:   88 8 kg (195 lb 12 3 oz)    Height:         Temp (24hrs), Av °F (36 1 °C), Min:95 9 °F (35 5 °C), Max:98 3 °F (36 8 °C)  Current: Temperature: 98 3 °F (36 8 °C)     Respiratory:  SpO2: SpO2: 98 %, SpO2 Activity: SpO2 Activity: At Rest, SpO2 Device: O2 Device: Ventilator  Nasal Cannula O2 Flow Rate (L/min): 10 L/min    Invasive/non-invasive ventilation settings   Respiratory    Lab Data (Last 4 hours)    None         O2/Vent Data (Last 4 hours)      01/15 0327           Vent Mode AC/VC       Resp Rate (BPM) (BPM) 14       Vt (mL) (mL) 520       FIO2 (%) (%) 60       PEEP (cmH2O) (cmH2O) 10       MV 9 15                   Physical Exam  Constitutional:       Appearance: He is obese  HENT:      Head: Normocephalic and atraumatic  Nose: Nose normal       Mouth/Throat:      Pharynx: Oropharyngeal exudate present  Eyes:      General: No scleral icterus  Conjunctiva/sclera: Conjunctivae normal       Pupils: Pupils are equal, round, and reactive to light  Neck:      Musculoskeletal: Normal range of motion and neck supple  Cardiovascular:      Rate and Rhythm: Regular rhythm  Bradycardia present        Heart sounds: Normal heart sounds  Pulmonary:      Effort: No respiratory distress  Breath sounds: Rhonchi and rales present  Abdominal:      General: There is no distension  Palpations: Abdomen is soft  Tenderness: There is no abdominal tenderness  Genitourinary:     Comments: Rafy  Musculoskeletal:      Right lower leg: No edema  Left lower leg: No edema  Skin:     General: Skin is warm and dry  Capillary Refill: Capillary refill takes less than 2 seconds  Coloration: Skin is not jaundiced  Neurological:      General: No focal deficit present  Mental Status: He is alert  Laboratory and Diagnostics:  Results from last 7 days   Lab Units 01/15/21  0324 01/14/21  1628 01/14/21  0558 01/13/21  2341 01/13/21  1151 01/13/21  0757 01/12/21  1609 01/12/21  0352 01/11/21  0425  01/10/21  0853   WBC Thousand/uL 86 46*  --  74 16*  --  70 15* 84 73* 110 11* 105 45* 65 14*  --  50 92*   HEMOGLOBIN g/dL 7 2* 7 2* 7 2* 6 7* 5 8* 6 8* 7 8* 7 5* 8 7*  --  9 1*   I STAT HEMOGLOBIN   --   --   --   --   --   --   --   --   --    < >  --    HEMATOCRIT % 23 7* 23 6* 22 8* 21 5* 19 3* 21 9* 25 7* 23 9* 26 9*  --  26 5*   HEMATOCRIT, ISTAT   --   --   --   --   --   --   --   --   --    < >  --    PLATELETS Thousands/uL 276  --  266  --  293 318 359 327 237   < > 241   NEUTROS PCT %  --   --  9*  --   --   --   --  11*  --   --   --    BANDS PCT %  --   --   --   --   --   --   --   --  2  --  1   MONOS PCT %  --   --  1*  --   --   --   --  0*  --   --   --    MONO PCT % 0*  --   --   --   --  1* 1*  --  5  --  1*    < > = values in this interval not displayed       Results from last 7 days   Lab Units 01/15/21  0324 01/14/21  1628 01/14/21  0558 01/13/21  2340 01/13/21  1121 01/13/21  0511 01/12/21  0352  01/11/21  0425  01/10/21  1250   SODIUM mmol/L 136 135* 134* 133* 132* 133* 129*   < >  --    < > 121*   POTASSIUM mmol/L 4 7 4 6 4 9 4 9 4 8 4 4 4 5   < >  --    < > 4 4   CHLORIDE mmol/L 105 103 102 102 101 99* 97*   < >  --    < > 88*   CO2 mmol/L 26 26 24 25 26 27 25   < >  --    < > 24   ANION GAP mmol/L 5 6 8 6 5 7 7   < >  --    < > 9   BUN mg/dL 18 19 21 22 20 23 18   < >  --    < > 26*   CREATININE mg/dL 0 65 0 66 0 61 0 71 0 77 0 72 0 75   < >  --    < > 1 13   CALCIUM mg/dL 7 9* 7 8* 7 8* 7 7* 8 0* 7 6* 8 3   < >  --    < > 8 4   GLUCOSE RANDOM mg/dL 159* 190* 206* 159* 120 118 150*   < >  --    < > 143*   ALT U/L 94*  --  99*  --  134* 124* 162*  --  73  --  51   AST U/L 36  --  67*  --  85* 103* 174*  --  100*  --  53*   ALK PHOS U/L 116  --  115  --  137* 132* 133*  --  107  --  116   ALBUMIN g/dL 2 1*  --  2 0*  --  2 3* 2 2* 2 5*  --  2 5*  --  2 7*   TOTAL BILIRUBIN mg/dL 1 75*  --  2 48*  --  2 85* 2 27* 2 22*  --  1 49*  --  1 97*    < > = values in this interval not displayed       Results from last 7 days   Lab Units 01/15/21  0324 01/14/21  0558 01/13/21  0511 01/11/21  0425 01/10/21  0852   MAGNESIUM mg/dL 2 5 2 5 2 0 2 0 1 9   PHOSPHORUS mg/dL 3 5  --   --  3 4  --       Results from last 7 days   Lab Units 01/13/21  2340 01/10/21  0852   INR  1 61* 1 18*   PTT seconds  --  35*      Results from last 7 days   Lab Units 01/14/21  0559 01/14/21  0254 01/13/21  2340 01/10/21  0852   TROPONIN I ng/mL 0 22* 0 28* 0 34* <0 03     Results from last 7 days   Lab Units 01/10/21  0852   LACTIC ACID mmol/L 0 7     ABG:  Results from last 7 days   Lab Units 01/14/21  0550   PH ART  7 399   PCO2 ART mm Hg 41 3   PO2 ART mm Hg 86 2   HCO3 ART mmol/L 25 0   BASE EXC ART mmol/L 0 1   ABG SOURCE  Radial, Left     VBG:  Results from last 7 days   Lab Units 01/14/21  0550   ABG SOURCE  Radial, Left     Results from last 7 days   Lab Units 01/14/21  0558 01/13/21  0757 01/11/21  0425 01/10/21  1250 01/10/21  0852   PROCALCITONIN ng/ml 0 06 0 12 0 14 0 21 0 26*       Micro  Results from last 7 days   Lab Units 01/13/21  0758 01/10/21  1423 01/10/21  1251 01/10/21  1012 01/10/21  0852   BLOOD CULTURE   --  No Growth After 4 Days  No Growth After 4 Days  No Growth After 4 Days  No Growth After 4 Days  SPUTUM CULTURE  No growth  --   --   --   --    GRAM STAIN RESULT  No Epithelial cells per low power field  No Polys or Bacteria seen  --   --   --   --        EKG: Reviewed      Imaging:  I have personally reviewed pertinent films in PACS    Intake and Output  I/O       01/13 0701 - 01/14 0700 01/14 0701 - 01/15 0700    P  O  0     I V  (mL/kg) 724 (8 8) 750 (8 4)    Blood 943 8     NG/ 210    IV Piggyback 150 100    Feedings 330 1169    Total Intake(mL/kg) 2267 7 (27 4) 2229 (25 1)    Urine (mL/kg/hr) 2225 (1 1) 1290 (0 6)    Stool  0    Total Output 2225 1290    Net +42 7 +939          Unmeasured Stool Occurrence  1 x          Height and Weights   Height: 5' 6" (167 6 cm)(most recent ht in chart from 11/10/20 encounter)  IBW: 63 8 kg  Body mass index is 31 6 kg/m²  Weight (last 2 days)     Date/Time   Weight    01/15/21 0541   88 8 (195 77)    01/13/21 0815   82 7 (182 41)                Nutrition       Diet Orders   (From admission, onward)             Start     Ordered    01/13/21 0723  Diet Enteral/Parenteral; Tube Feeding No Oral Diet; Jevity 1 2 Chad; Continuous; 20  Diet effective now     Question Answer Comment   Diet Type Enteral/Parenteral    Enteral/Parenteral Tube Feeding No Oral Diet    Tube Feeding Formula: Jevity 1 2 Chad    Bolus/Cyclic/Continuous Continuous    Tube Feeding Goal Rate (mL/hr): 20    RD to adjust diet per protocol? Yes        01/13/21 0726    01/10/21 1624  Room Service  Once     Question:  Type of Service  Answer:  Room Service - Appropriate with Assistance    01/10/21 1623              TF currently running at 20 ml/hr with a goal of 20 ml/hr   Formula: Jevity 1 2      Active Medications  Scheduled Meds:  Current Facility-Administered Medications   Medication Dose Route Frequency Provider Last Rate    albuterol  2 puff Inhalation Q4H PRN MATHEW North      ascorbic acid 1,000 mg Per NG Tube Q12H [facility] Odalys Mccloud PA-C      aspirin  81 mg Oral Daily MATHEW Hernandez      atorvastatin  40 mg Per NG Tube HS Odalys Mccloud PA-C      bisacodyl  10 mg Rectal Daily PRN MATHEW Parrish      cefepime  1,000 mg Intravenous Q12H Odalys Mccloud PA-C Stopped (01/15/21 0400)    chlorhexidine  15 mL Swish & Spit Q12H [facility] Lala MATHEW Ferguson      cholecalciferol  2,000 Units Per NG Tube Daily Odalys Mccloud PA-C      dexamethasone  0 1 mg/kg Intravenous Q12H MATHEW Hernandez      dextromethorphan-guaiFENesin  10 mL Oral Q4H PRN Shane Quinn PA-C      enoxaparin  1 mg/kg Subcutaneous Q12H [facility] Willis Kay DO      famotidine  20 mg Per NG Tube BID MATHEW Amos      fentanyl citrate (PF)  50 mcg Intravenous Q1H PRN MATHEW Amos      fluticasone  2 spray Nasal Daily PRN MATHEW Hernandez      folic acid  1 mg Oral Daily Kassandra Whitehead MD      glycerin-hypromellose-  1 drop Both Eyes Q3H PRN Scottie Cary MD      insulin lispro  1-5 Units Subcutaneous Q6H [facility] Temitope Sesay MD      levalbuterol  1 25 mg Nebulization TID Odalys Mccloud PA-C      melatonin  6 mg Oral HS MATHEW Hernandez      midazolam  2 mg Intravenous Q4H PRN Kassandra Whitehead MD      montelukast  10 mg Oral Daily MATHEW Hernandez      zinc sulfate  220 mg Oral Daily MATHEW Hernandez      Followed by   Tamanna Freeman ON 1/17/2021] multivitamin-minerals  1 tablet Oral Daily MATHEW Hernandez      polyethylene glycol  17 g Oral Daily Lutricia Galeazzi, PA-C      propofol  5-50 mcg/kg/min Intravenous Titrated Odalys Mccloud PA-C 50 mcg/kg/min (01/15/21 0339)    senna-docusate sodium  1 tablet Oral HS Lutricia Galeazzi, PA-C      sodium chloride  4 mL Nebulization TID Odalys Mccloud PA-C      thiamine  100 mg Per NG Tube Daily Odalys Mccloud PA-C      vancomycin  20 mg/kg (Adjusted) Intravenous Q12H Feliciano Guy PA-C 1,500 mg (01/15/21 7016)     Continuous Infusions:  propofol, 5-50 mcg/kg/min, Last Rate: 50 mcg/kg/min (01/15/21 1219)      PRN Meds:   albuterol, 2 puff, Q4H PRN  bisacodyl, 10 mg, Daily PRN  dextromethorphan-guaiFENesin, 10 mL, Q4H PRN  fentanyl citrate (PF), 50 mcg, Q1H PRN  fluticasone, 2 spray, Daily PRN  glycerin-hypromellose-, 1 drop, Q3H PRN  midazolam, 2 mg, Q4H PRN        Allergies   Allergies   Allergen Reactions    Codeine Other (See Comments)     agitated    Sulfamethoxazole-Trimethoprim GI Intolerance and Abdominal Pain     upset stomach     ---------------------------------------------------------------------------------------  Advance Directive and Living Will: Yes    Power of :    POLST:    ---------------------------------------------------------------------------------------  Care Time Delivered:   No Critical Care time spent     Miki Nobles PA-C

## 2021-01-15 NOTE — ASSESSMENT & PLAN NOTE
· Patient drinks 3-4 beers a day about 4 times a week  Plan:  · JIMWA protocol p r n   · Encourage cessation of alcohol use

## 2021-01-15 NOTE — ASSESSMENT & PLAN NOTE
· Patient was diagnosed on 01/04/2021 after 2 days symptoms  His wife also tested positive  Patient presented for [facility] 1677 when he was found to be severely hypotensive and hypoxic  Patient was immediately referred to the ED where he was started on HFNC and fluid boluses  · Currently on severe pathway for COVID-19 protocol for management and on the ventilator      Plan:  · Continue management per CCU Team

## 2021-01-15 NOTE — ASSESSMENT & PLAN NOTE
· Polysomnogram very remote and was self-reported to have mild DIXIE currently untreated  · Patient has a history of pulmonary hypertension which may be secondary or exacerbated by DIXIE  · Echo on 01/15/2021 was significant for PA pressure of 75 mm Hg  Plan:  · Patient is intubated on ventilator support

## 2021-01-15 NOTE — ASSESSMENT & PLAN NOTE
Recent Labs     01/13/21  1151 01/14/21  0558 01/15/21  0324   WBC 70 15* 74 16* 86 46*     · Patient has been in remission  Plan:  · Will continue follow-up with heme Onc and yearly for Venofer infusions    · Monitor CBC for WBC

## 2021-01-16 LAB
ALBUMIN SERPL BCP-MCNC: 2.5 G/DL (ref 3.5–5)
ALP SERPL-CCNC: 127 U/L (ref 46–116)
ALT SERPL W P-5'-P-CCNC: 77 U/L (ref 12–78)
ANION GAP SERPL CALCULATED.3IONS-SCNC: 4 MMOL/L (ref 4–13)
ANION GAP SERPL CALCULATED.3IONS-SCNC: 6 MMOL/L (ref 4–13)
AST SERPL W P-5'-P-CCNC: 34 U/L (ref 5–45)
ATRIAL RATE: 60 BPM
ATRIAL RATE: 71 BPM
BASOPHILS # BLD AUTO: 0.03 THOUSANDS/ΜL (ref 0–0.1)
BASOPHILS NFR BLD AUTO: 0 % (ref 0–1)
BILIRUB SERPL-MCNC: 1.61 MG/DL (ref 0.2–1)
BUN SERPL-MCNC: 19 MG/DL (ref 5–25)
BUN SERPL-MCNC: 19 MG/DL (ref 5–25)
CALCIUM ALBUM COR SERPL-MCNC: 9.1 MG/DL (ref 8.3–10.1)
CALCIUM SERPL-MCNC: 7.9 MG/DL (ref 8.3–10.1)
CALCIUM SERPL-MCNC: 8.1 MG/DL (ref 8.3–10.1)
CHLORIDE SERPL-SCNC: 102 MMOL/L (ref 100–108)
CHLORIDE SERPL-SCNC: 104 MMOL/L (ref 100–108)
CO2 SERPL-SCNC: 29 MMOL/L (ref 21–32)
CO2 SERPL-SCNC: 30 MMOL/L (ref 21–32)
CREAT SERPL-MCNC: 0.58 MG/DL (ref 0.6–1.3)
CREAT SERPL-MCNC: 0.74 MG/DL (ref 0.6–1.3)
CRP SERPL QL: 64.2 MG/L
D DIMER PPP FEU-MCNC: 9.12 UG/ML FEU
EOSINOPHIL # BLD AUTO: 0.03 THOUSAND/ΜL (ref 0–0.61)
EOSINOPHIL NFR BLD AUTO: 0 % (ref 0–6)
ERYTHROCYTE [DISTWIDTH] IN BLOOD BY AUTOMATED COUNT: 20.2 % (ref 11.6–15.1)
EST. AVERAGE GLUCOSE BLD GHB EST-MCNC: 114 MG/DL
FERRITIN SERPL-MCNC: 1380 NG/ML (ref 8–388)
FERRITIN SERPL-MCNC: 1510 NG/ML (ref 8–388)
FOLATE SERPL-MCNC: 11.8 NG/ML (ref 3.1–17.5)
GFR SERPL CREATININE-BSD FRML MDRD: 105 ML/MIN/1.73SQ M
GFR SERPL CREATININE-BSD FRML MDRD: 95 ML/MIN/1.73SQ M
GLUCOSE SERPL-MCNC: 124 MG/DL (ref 65–140)
GLUCOSE SERPL-MCNC: 127 MG/DL (ref 65–140)
GLUCOSE SERPL-MCNC: 138 MG/DL (ref 65–140)
GLUCOSE SERPL-MCNC: 144 MG/DL (ref 65–140)
HBA1C MFR BLD: 5.6 %
HCT VFR BLD AUTO: 30.2 % (ref 36.5–49.3)
HGB BLD-MCNC: 8.8 G/DL (ref 12–17)
IMM GRANULOCYTES # BLD AUTO: >0.5 THOUSAND/UL (ref 0–0.2)
IMM GRANULOCYTES NFR BLD AUTO: 1 % (ref 0–2)
IRON SATN MFR SERPL: 31 %
IRON SERPL-MCNC: 64 UG/DL (ref 65–175)
LYMPHOCYTES # BLD AUTO: >100 THOUSANDS/ΜL (ref 0.6–4.47)
LYMPHOCYTES NFR BLD AUTO: 92 % (ref 14–44)
MAGNESIUM SERPL-MCNC: 2.2 MG/DL (ref 1.6–2.6)
MCH RBC QN AUTO: 29.7 PG (ref 26.8–34.3)
MCHC RBC AUTO-ENTMCNC: 29.1 G/DL (ref 31.4–37.4)
MCV RBC AUTO: 102 FL (ref 82–98)
MONOCYTES # BLD AUTO: 0.23 THOUSAND/ΜL (ref 0.17–1.22)
MONOCYTES NFR BLD AUTO: 0 % (ref 4–12)
MRSA NOSE QL CULT: NORMAL
NEUTROPHILS # BLD AUTO: 8.61 THOUSANDS/ΜL (ref 1.85–7.62)
NEUTS SEG NFR BLD AUTO: 7 % (ref 43–75)
NRBC BLD AUTO-RTO: 0 /100 WBCS
P AXIS: 61 DEGREES
P AXIS: 70 DEGREES
PLATELET # BLD AUTO: 384 THOUSANDS/UL (ref 149–390)
PMV BLD AUTO: 9.8 FL (ref 8.9–12.7)
POTASSIUM SERPL-SCNC: 5.3 MMOL/L (ref 3.5–5.3)
POTASSIUM SERPL-SCNC: 5.6 MMOL/L (ref 3.5–5.3)
PR INTERVAL: 142 MS
PR INTERVAL: 148 MS
PROT SERPL-MCNC: 6.6 G/DL (ref 6.4–8.2)
QRS AXIS: 76 DEGREES
QRS AXIS: 77 DEGREES
QRSD INTERVAL: 96 MS
QRSD INTERVAL: 98 MS
QT INTERVAL: 642 MS
QT INTERVAL: 648 MS
QTC INTERVAL: 679 MS
QTC INTERVAL: 697 MS
RBC # BLD AUTO: 2.96 MILLION/UL (ref 3.88–5.62)
SODIUM SERPL-SCNC: 137 MMOL/L (ref 136–145)
SODIUM SERPL-SCNC: 138 MMOL/L (ref 136–145)
T WAVE AXIS: 118 DEGREES
T WAVE AXIS: 126 DEGREES
T4 FREE SERPL-MCNC: 1.28 NG/DL (ref 0.76–1.46)
TIBC SERPL-MCNC: 208 UG/DL (ref 250–450)
TRIGL SERPL-MCNC: 117 MG/DL
VENTRICULAR RATE: 66 BPM
VENTRICULAR RATE: 71 BPM
VIT B12 SERPL-MCNC: 794 PG/ML (ref 100–900)
WBC # BLD AUTO: 125.45 THOUSAND/UL (ref 4.31–10.16)

## 2021-01-16 PROCEDURE — 99292 CRITICAL CARE ADDL 30 MIN: CPT | Performed by: INTERNAL MEDICINE

## 2021-01-16 PROCEDURE — 85379 FIBRIN DEGRADATION QUANT: CPT | Performed by: INTERNAL MEDICINE

## 2021-01-16 PROCEDURE — 93010 ELECTROCARDIOGRAM REPORT: CPT | Performed by: INTERNAL MEDICINE

## 2021-01-16 PROCEDURE — 82728 ASSAY OF FERRITIN: CPT | Performed by: NURSE PRACTITIONER

## 2021-01-16 PROCEDURE — 94150 VITAL CAPACITY TEST: CPT

## 2021-01-16 PROCEDURE — 82607 VITAMIN B-12: CPT | Performed by: NURSE PRACTITIONER

## 2021-01-16 PROCEDURE — 83735 ASSAY OF MAGNESIUM: CPT | Performed by: INTERNAL MEDICINE

## 2021-01-16 PROCEDURE — 80048 BASIC METABOLIC PNL TOTAL CA: CPT | Performed by: PHYSICIAN ASSISTANT

## 2021-01-16 PROCEDURE — 83550 IRON BINDING TEST: CPT | Performed by: NURSE PRACTITIONER

## 2021-01-16 PROCEDURE — 94760 N-INVAS EAR/PLS OXIMETRY 1: CPT

## 2021-01-16 PROCEDURE — 82728 ASSAY OF FERRITIN: CPT | Performed by: INTERNAL MEDICINE

## 2021-01-16 PROCEDURE — 94003 VENT MGMT INPAT SUBQ DAY: CPT

## 2021-01-16 PROCEDURE — 86140 C-REACTIVE PROTEIN: CPT | Performed by: INTERNAL MEDICINE

## 2021-01-16 PROCEDURE — 99291 CRITICAL CARE FIRST HOUR: CPT | Performed by: NURSE PRACTITIONER

## 2021-01-16 PROCEDURE — 94762 N-INVAS EAR/PLS OXIMTRY CONT: CPT

## 2021-01-16 PROCEDURE — 85027 COMPLETE CBC AUTOMATED: CPT | Performed by: INTERNAL MEDICINE

## 2021-01-16 PROCEDURE — 83540 ASSAY OF IRON: CPT | Performed by: NURSE PRACTITIONER

## 2021-01-16 PROCEDURE — 80053 COMPREHEN METABOLIC PANEL: CPT | Performed by: INTERNAL MEDICINE

## 2021-01-16 PROCEDURE — 93005 ELECTROCARDIOGRAM TRACING: CPT

## 2021-01-16 PROCEDURE — 94664 DEMO&/EVAL PT USE INHALER: CPT

## 2021-01-16 PROCEDURE — 83036 HEMOGLOBIN GLYCOSYLATED A1C: CPT | Performed by: NURSE PRACTITIONER

## 2021-01-16 PROCEDURE — 84478 ASSAY OF TRIGLYCERIDES: CPT | Performed by: INTERNAL MEDICINE

## 2021-01-16 PROCEDURE — 82746 ASSAY OF FOLIC ACID SERUM: CPT | Performed by: NURSE PRACTITIONER

## 2021-01-16 PROCEDURE — 82948 REAGENT STRIP/BLOOD GLUCOSE: CPT

## 2021-01-16 PROCEDURE — 94640 AIRWAY INHALATION TREATMENT: CPT

## 2021-01-16 RX ORDER — HYDRALAZINE HYDROCHLORIDE 20 MG/ML
10 INJECTION INTRAMUSCULAR; INTRAVENOUS EVERY 6 HOURS PRN
Status: DISCONTINUED | OUTPATIENT
Start: 2021-01-16 | End: 2021-01-16

## 2021-01-16 RX ORDER — FUROSEMIDE 10 MG/ML
40 INJECTION INTRAMUSCULAR; INTRAVENOUS ONCE
Status: COMPLETED | OUTPATIENT
Start: 2021-01-16 | End: 2021-01-16

## 2021-01-16 RX ORDER — HYDRALAZINE HYDROCHLORIDE 20 MG/ML
20 INJECTION INTRAMUSCULAR; INTRAVENOUS EVERY 6 HOURS PRN
Status: DISCONTINUED | OUTPATIENT
Start: 2021-01-16 | End: 2021-01-21 | Stop reason: HOSPADM

## 2021-01-16 RX ADMIN — HYDRALAZINE HYDROCHLORIDE 10 MG: 20 INJECTION, SOLUTION INTRAMUSCULAR; INTRAVENOUS at 11:26

## 2021-01-16 RX ADMIN — ATORVASTATIN CALCIUM 40 MG: 40 TABLET, FILM COATED ORAL at 21:04

## 2021-01-16 RX ADMIN — Medication 100 MCG/HR: at 00:07

## 2021-01-16 RX ADMIN — MONTELUKAST SODIUM 10 MG: 10 TABLET, FILM COATED ORAL at 08:36

## 2021-01-16 RX ADMIN — DOCUSATE SODIUM AND SENNOSIDES 1 TABLET: 8.6; 5 TABLET ORAL at 21:04

## 2021-01-16 RX ADMIN — FOLIC ACID 1 MG: 1 TABLET ORAL at 08:36

## 2021-01-16 RX ADMIN — HYDRALAZINE HYDROCHLORIDE 20 MG: 20 INJECTION, SOLUTION INTRAMUSCULAR; INTRAVENOUS at 18:23

## 2021-01-16 RX ADMIN — SERTRALINE HYDROCHLORIDE 50 MG: 50 TABLET ORAL at 08:36

## 2021-01-16 RX ADMIN — DEXAMETHASONE SODIUM PHOSPHATE 8.5 MG: 10 INJECTION, SOLUTION INTRAMUSCULAR; INTRAVENOUS at 08:35

## 2021-01-16 RX ADMIN — THIAMINE HCL TAB 100 MG 100 MG: 100 TAB at 08:36

## 2021-01-16 RX ADMIN — OXYCODONE HYDROCHLORIDE AND ACETAMINOPHEN 1000 MG: 500 TABLET ORAL at 21:04

## 2021-01-16 RX ADMIN — ENOXAPARIN SODIUM 80 MG: 80 INJECTION SUBCUTANEOUS at 08:35

## 2021-01-16 RX ADMIN — AMLODIPINE BESYLATE 5 MG: 5 TABLET ORAL at 17:28

## 2021-01-16 RX ADMIN — LEVALBUTEROL HYDROCHLORIDE 1.25 MG: 1.25 SOLUTION, CONCENTRATE RESPIRATORY (INHALATION) at 19:24

## 2021-01-16 RX ADMIN — INSULIN LISPRO 1 UNITS: 100 INJECTION, SOLUTION INTRAVENOUS; SUBCUTANEOUS at 23:38

## 2021-01-16 RX ADMIN — CHLORHEXIDINE GLUCONATE 0.12% ORAL RINSE 15 ML: 1.2 LIQUID ORAL at 21:04

## 2021-01-16 RX ADMIN — LEVALBUTEROL HYDROCHLORIDE 1.25 MG: 1.25 SOLUTION, CONCENTRATE RESPIRATORY (INHALATION) at 07:24

## 2021-01-16 RX ADMIN — OXYCODONE HYDROCHLORIDE AND ACETAMINOPHEN 1000 MG: 500 TABLET ORAL at 08:36

## 2021-01-16 RX ADMIN — VANCOMYCIN HYDROCHLORIDE 1500 MG: 5 INJECTION, POWDER, LYOPHILIZED, FOR SOLUTION INTRAVENOUS at 16:59

## 2021-01-16 RX ADMIN — LEVALBUTEROL HYDROCHLORIDE 1.25 MG: 1.25 SOLUTION, CONCENTRATE RESPIRATORY (INHALATION) at 14:18

## 2021-01-16 RX ADMIN — DEXAMETHASONE SODIUM PHOSPHATE 8.5 MG: 10 INJECTION, SOLUTION INTRAMUSCULAR; INTRAVENOUS at 21:05

## 2021-01-16 RX ADMIN — MELATONIN 6 MG: at 21:04

## 2021-01-16 RX ADMIN — SODIUM CHLORIDE SOLN NEBU 3% 4 ML: 3 NEBU SOLN at 14:18

## 2021-01-16 RX ADMIN — ZINC SULFATE 220 MG (50 MG) CAPSULE 220 MG: CAPSULE at 08:36

## 2021-01-16 RX ADMIN — Medication 2000 UNITS: at 08:36

## 2021-01-16 RX ADMIN — CEFEPIME HYDROCHLORIDE 1000 MG: 1 INJECTION, POWDER, FOR SOLUTION INTRAMUSCULAR; INTRAVENOUS at 03:57

## 2021-01-16 RX ADMIN — LOSARTAN POTASSIUM 50 MG: 50 TABLET, FILM COATED ORAL at 08:36

## 2021-01-16 RX ADMIN — FENTANYL CITRATE 50 MCG: 50 INJECTION INTRAMUSCULAR; INTRAVENOUS at 06:22

## 2021-01-16 RX ADMIN — POLYETHYLENE GLYCOL 3350 17 G: 17 POWDER, FOR SOLUTION ORAL at 08:35

## 2021-01-16 RX ADMIN — FUROSEMIDE 40 MG: 10 INJECTION, SOLUTION INTRAMUSCULAR; INTRAVENOUS at 02:30

## 2021-01-16 RX ADMIN — Medication 100 MCG/HR: at 10:04

## 2021-01-16 RX ADMIN — SODIUM CHLORIDE SOLN NEBU 3% 4 ML: 3 NEBU SOLN at 19:24

## 2021-01-16 RX ADMIN — FAMOTIDINE 20 MG: 40 POWDER, FOR SUSPENSION ORAL at 08:35

## 2021-01-16 RX ADMIN — ASPIRIN 81 MG: 81 TABLET ORAL at 08:36

## 2021-01-16 RX ADMIN — FAMOTIDINE 20 MG: 40 POWDER, FOR SUSPENSION ORAL at 17:28

## 2021-01-16 RX ADMIN — Medication 100 MCG/HR: at 20:25

## 2021-01-16 RX ADMIN — FUROSEMIDE 40 MG: 10 INJECTION, SOLUTION INTRAMUSCULAR; INTRAVENOUS at 13:18

## 2021-01-16 RX ADMIN — CHLORHEXIDINE GLUCONATE 0.12% ORAL RINSE 15 ML: 1.2 LIQUID ORAL at 08:36

## 2021-01-16 RX ADMIN — VANCOMYCIN HYDROCHLORIDE 1500 MG: 5 INJECTION, POWDER, LYOPHILIZED, FOR SOLUTION INTRAVENOUS at 06:00

## 2021-01-16 RX ADMIN — SODIUM CHLORIDE SOLN NEBU 3% 4 ML: 3 NEBU SOLN at 07:24

## 2021-01-16 RX ADMIN — ENOXAPARIN SODIUM 80 MG: 80 INJECTION SUBCUTANEOUS at 21:05

## 2021-01-16 RX ADMIN — CEFEPIME HYDROCHLORIDE 1000 MG: 1 INJECTION, POWDER, FOR SOLUTION INTRAMUSCULAR; INTRAVENOUS at 14:38

## 2021-01-16 NOTE — PROGRESS NOTES
Progress Note - Kortney Russellpool 1950, 78 y o  male MRN: [id number]    Unit/Bed#: ICU 08 Encounter: [id number]    Primary Care Provider: Sandra Schultz MD   Date and time admitted to hospital: 1/10/2021 12:16 PM        * COVID-19  Assessment & Plan  · CXR bilateral ground-glass opacities worsened from 01/10  · Severe COVID-19 tx algorithm/Positive 1/4  · BC no growth  MEDICATIONS  · Decadron 0 1 milligram/kilogram D 7/10/Pepcid b i d   · Doxy/ceftriazone escalated to cefepime/Vanco currently day 5 of 5   · Vit C/Zinc x7 days then MV/Vit D  · Atorvastatin transition home dose on discharge   · Enoxaparin therapeutic  · Remdesimir completed 1/14   · Plasma 1/11  · Actemra 1/13 not redosed given down trending inflammatory marker  INFLAMMATORY MARKERS (trend q 48-72)  · CRP -887-145  · Ferritin 3306-8393-2009  · D-Dimer 0 3-77-lqbtsyl than 20-12  · Troponin peak 0 3  ·   · CK 34  · PCT 0 26-0 2 1-0 06  · Hepatitis negative  · HIV negative      Acute respiratory failure with hypoxia (HCC)  Assessment & Plan  · Secondary to COVID-19 viral pneumonia concern for superimposed bacterial  · ETT day 4  · Currently on mechanical ventilation changed to pressure support overnight due to breath stacking  · PC 14/14/70/10  · Wean oxygen for saturations greater than 88%  · VAP bundle  · Transition to fentanyl drip only given bradycardia goal RASS 0 to -1  · Dose with lasix x1 for net negative    QT prolongation  Assessment & Plan  ·   · Cardiology following  · Avoid QTC prolonging medications  · Weaning off propofol  · Daily EKGs    Anemia  Assessment & Plan  · Baseline hemoglobin 12  · Status post PRBCs on 01/13 in 1/14  · No signs of acute bleeding  · Will check occult/iron B12 and folate  · CBC in the a m      Sinus los-tachy syndrome (HCC)  Assessment & Plan  · TSH 0 3 awaiting free T4  · Coreg currently being held  · Weaning off propofol  · Hemodynamically stable  · Cardiology following    Transaminitis  Assessment & Plan  · With elevated t bili  · Improving   · RUQ/CT cholelithiasis    Acute cystitis without hematuria  Assessment & Plan  · Large leukocytes/innumerable wbc's/occasional bacteria  · Did not reflex  · Currently on cefepime which will cover day for antibiotic day 6    Hyperglycemia  Assessment & Plan  · On steroids  · Check A1c  · Continue sliding scale insulin    Diverticulitis  Assessment & Plan  · Noted acute uncomplicated sigmoid diverticulitis on CT scan    Essential hypertension  Assessment & Plan  · Resume home Norvasc  · Resumed Cozaar  · Holding Coreg with bradycardia  · Consider resuming minoxidil if needed    Hypercholesterolemia  Assessment & Plan  · Currently on COVID dose statin protocol    Mild depression ([address] )  Assessment & Plan  · Continue home Zoloft    Alcohol abuse  Assessment & Plan  · No signs and symptoms of alcohol withdrawal  ·  Thiamine/folate    Pulmonary hypertension (HCC)  Assessment & Plan  · PA pressure of 75  · RV mildly down  · Outpatient follow-up    BPH with obstruction/lower urinary tract symptoms  Assessment & Plan  · Flomax on hold is unable to crush    CLL (chronic lymphocytic leukemia) ([address] )  Assessment & Plan  · Outpatient follow-up with Oncology    ----------------------------------------------------------------------------------------  HPI/24hr events:  Patient with breath stacking overnight changed to pressure control ventilation  Hypertensive restarted oral meds  Noted to be bradycardic Cardiology consulted found to have prolonged QTC weaned off propofol   Decreased fio2 70    Disposition: Continue Critical Care   Code Status: Level 1 - Full Code  ---------------------------------------------------------------------------------------  SUBJECTIVE  Intubated    Review of Systems   Unable to perform ROS: Intubated     Review of systems was unable to be performed secondary to Intubation  ---------------------------------------------------------------------------------------  OBJECTIVE    Vitals   Vitals:    01/15/21 1953 01/15/21 2056 01/15/21 2343 21 0000   BP:    (!) 188/86   BP Location:       Pulse:    59   Resp:    18   Temp:       TempSrc:       SpO2: 97% 93% 94% 95%   Weight:       Height:         Temp (24hrs), Av 9 °F (36 1 °C), Min:96 °F (35 6 °C), Max:98 3 °F (36 8 °C)  Current: Temperature: (!) 96 4 °F (35 8 °C)          Respiratory:  SpO2: SpO2: 95 %  Nasal Cannula O2 Flow Rate (L/min): 10 L/min    Invasive/non-invasive ventilation settings   Respiratory    Lab Data (Last 4 hours)      01/15 2221            pH, Arterial       7 373           pCO2, Arterial       45 3           pO2, Arterial       75 3           HCO3, Arterial       25 8           Base Excess, Arterial       0 4                O2/Vent Data       01/15 2056   Most Recent         Vent Mode AC/PC  AC/PC      Resp Rate (BPM) (BPM) 14  14      Pressure Control (cmH2O) (cm) 14  14      Insp Time (sec) (sec) 1  1      FiO2 (%) (%) 80  80      PEEP (cmH2O) (cmH2O) 10  10      MV 11 2  7 33                  Physical Exam  Constitutional:       General: He is not in acute distress  Interventions: He is sedated, intubated and restrained  HENT:      Head: Normocephalic and atraumatic  Mouth/Throat:      Mouth: Mucous membranes are moist    Eyes:      General: No scleral icterus  Pupils: Pupils are equal, round, and reactive to light  Neck:      Musculoskeletal: Neck supple  Vascular: No JVD  Cardiovascular:      Rate and Rhythm: Normal rate and regular rhythm  Heart sounds: Normal heart sounds  No murmur  No friction rub  No gallop  Pulmonary:      Effort: Pulmonary effort is normal  No respiratory distress  He is intubated  Breath sounds: No wheezing or rales  Comments: Coarse breath sounds  Abdominal:      General: Bowel sounds are normal  There is distension  Palpations: Abdomen is soft  Musculoskeletal: Normal range of motion  Skin:     General: Skin is warm and dry  Coloration: Skin is not pale  Findings: No erythema or rash  Neurological:      GCS: GCS eye subscore is 4  GCS verbal subscore is 1  GCS motor subscore is 6  Cranial Nerves: No cranial nerve deficit  Laboratory and Diagnostics:  Results from last 7 days   Lab Units 01/15/21  0324 01/14/21  1628 01/14/21  0558 01/13/21  2341 01/13/21  1151 01/13/21  0757 01/12/21  1609 01/12/21  0352 01/11/21  0425  01/10/21  0853   WBC Thousand/uL 86 46*  --  74 16*  --  70 15* 84 73* 110 11* 105 45* 65 14*  --  50 92*   HEMOGLOBIN g/dL 7 2* 7 2* 7 2* 6 7* 5 8* 6 8* 7 8* 7 5* 8 7*  --  9 1*   I STAT HEMOGLOBIN   --   --   --   --   --   --   --   --   --    < >  --    HEMATOCRIT % 23 7* 23 6* 22 8* 21 5* 19 3* 21 9* 25 7* 23 9* 26 9*  --  26 5*   HEMATOCRIT, ISTAT   --   --   --   --   --   --   --   --   --    < >  --    PLATELETS Thousands/uL 276  --  266  --  293 318 359 327 237   < > 241   NEUTROS PCT %  --   --  9*  --   --   --   --  11*  --   --   --    BANDS PCT %  --   --   --   --   --   --   --   --  2  --  1   MONOS PCT %  --   --  1*  --   --   --   --  0*  --   --   --    MONO PCT % 0*  --   --   --   --  1* 1*  --  5  --  1*    < > = values in this interval not displayed       Results from last 7 days   Lab Units 01/15/21  0324 01/14/21  1628 01/14/21  0558 01/13/21  2340 01/13/21  1121 01/13/21  0511 01/12/21  0352  01/11/21  0425  01/10/21  1250   SODIUM mmol/L 136 135* 134* 133* 132* 133* 129*   < >  --    < > 121*   POTASSIUM mmol/L 4 7 4 6 4 9 4 9 4 8 4 4 4 5   < >  --    < > 4 4   CHLORIDE mmol/L 105 103 102 102 101 99* 97*   < >  --    < > 88*   CO2 mmol/L 26 26 24 25 26 27 25   < >  --    < > 24   ANION GAP mmol/L 5 6 8 6 5 7 7   < >  --    < > 9   BUN mg/dL 18 19 21 22 20 23 18   < >  --    < > 26*   CREATININE mg/dL 0 65 0 66 0 61 0 71 0 77 0 72 0 75   < >  --    < > 1  13   CALCIUM mg/dL 7 9* 7 8* 7 8* 7 7* 8 0* 7 6* 8 3   < >  --    < > 8 4   GLUCOSE RANDOM mg/dL 159* 190* 206* 159* 120 118 150*   < >  --    < > 143*   ALT U/L 94*  --  99*  --  134* 124* 162*  --  73  --  51   AST U/L 36  --  67*  --  85* 103* 174*  --  100*  --  53*   ALK PHOS U/L 116  --  115  --  137* 132* 133*  --  107  --  116   ALBUMIN g/dL 2 1*  --  2 0*  --  2 3* 2 2* 2 5*  --  2 5*  --  2 7*   TOTAL BILIRUBIN mg/dL 1 75*  --  2 48*  --  2 85* 2 27* 2 22*  --  1 49*  --  1 97*    < > = values in this interval not displayed  Results from last 7 days   Lab Units 01/15/21  0324 01/14/21  0558 01/13/21  0511 01/11/21  0425 01/10/21  0852   MAGNESIUM mg/dL 2 5 2 5 2 0 2 0 1 9   PHOSPHORUS mg/dL 3 5  --   --  3 4  --       Results from last 7 days   Lab Units 01/13/21  2340 01/10/21  0852   INR  1 61* 1 18*   PTT seconds  --  35*      Results from last 7 days   Lab Units 01/14/21  0559 01/14/21  0254 01/13/21  2340 01/10/21  0852   TROPONIN I ng/mL 0 22* 0 28* 0 34* <0 03     Results from last 7 days   Lab Units 01/10/21  0852   LACTIC ACID mmol/L 0 7     ABG:  Results from last 7 days   Lab Units 01/15/21  2221   PH ART  7 373   PCO2 ART mm Hg 45 3*   PO2 ART mm Hg 75 3   HCO3 ART mmol/L 25 8   BASE EXC ART mmol/L 0 4   ABG SOURCE  Radial, Left     VBG:  Results from last 7 days   Lab Units 01/15/21  2221   ABG SOURCE  Radial, Left     Results from last 7 days   Lab Units 01/14/21  0558 01/13/21  0757 01/11/21  0425 01/10/21  1250 01/10/21  0852   PROCALCITONIN ng/ml 0 06 0 12 0 14 0 21 0 26*       Micro  Results from last 7 days   Lab Units 01/13/21  0758 01/10/21  1423 01/10/21  1251 01/10/21  1012 01/10/21  0852   BLOOD CULTURE   --  No Growth After 5 Days  No Growth After 5 Days  No Growth After 5 Days  No Growth After 5 Days     SPUTUM CULTURE  No growth  --   --   --   --    GRAM STAIN RESULT  No Epithelial cells per low power field  No Polys or Bacteria seen  --   --   --   --        EKG:  Sinus bradycardia with junctional escape beats and PVCs prolonged QTC  Imaging: I have personally reviewed pertinent reports  and I have personally reviewed pertinent films in PACS    Intake and Output  I/O       01/14 0701 - 01/15 0700 01/15 0701 - 01/16 0700    P  O       I V  (mL/kg) 750 (8 4) 396 9 (4 5)    Blood      NG/ 210    IV Piggyback 100 50    Feedings 1169 240    Total Intake(mL/kg) 2229 (25 1) 896 9 (10 1)    Urine (mL/kg/hr) 1290 (0 6) 800 (0 6)    Stool 0 0    Total Output 1290 800    Net +939 +96 9          Unmeasured Stool Occurrence 1 x 1 x          Height and Weights   Height: 5' 6" (167 6 cm)(most recent ht in chart from 11/10/20 encounter)  IBW: 63 8 kg  Body mass index is 31 6 kg/m²  Weight (last 2 days)     Date/Time   Weight    01/15/21 0541   88 8 (195 77)                Nutrition       Diet Orders   (From admission, onward)             Start     Ordered    01/15/21 2150  Diet Enteral/Parenteral; Tube Feeding No Oral Diet; Jevity 1 2 Chad; Continuous; 40; Prosource Protein Liquid - Two Packets  Diet effective now     Question Answer Comment   Diet Type Enteral/Parenteral    Enteral/Parenteral Tube Feeding No Oral Diet    Tube Feeding Formula: Jevity 1 2 Chad    Bolus/Cyclic/Continuous Continuous    Tube Feeding Goal Rate (mL/hr): 40    Prosource Protein Liquid - No Carb Prosource Protein Liquid - Two Packets    RD to adjust diet per protocol?  Yes        01/15/21 2149    01/10/21 1624  Room Service  Once     Question:  Type of Service  Answer:  Room Service - Appropriate with Assistance    01/10/21 1623                  Active Medications  Scheduled Meds:  Current Facility-Administered Medications   Medication Dose Route Frequency Provider Last Rate    albuterol  2 puff Inhalation Q4H PRN SilverioMATHEW Hickman      amLODIPine  5 mg Oral QPM MATHEW Pandya      ascorbic acid  1,000 mg Per NG Tube Q12H [facility] Rodriguez Osorio PA-C      aspirin  81 mg Oral Daily Gabriele Armenta MATHEW Wang      atorvastatin  40 mg Per NG Tube HS Gilda Huynh PA-C      bisacodyl  10 mg Rectal Daily PRN MATHEW Main      cefepime  1,000 mg Intravenous Q12H [place] MATHEW MICHAEL      chlorhexidine  15 mL Swish & Spit Q12H [address] [facility]      cholecalciferol  2,000 Units Per NG Tube Daily Gilda Huynh PA-C      dexamethasone  0 1 mg/kg Intravenous Q12H Candida Loving, CRNP      dextromethorphan-guaiFENesin  10 mL Oral Q4H PRN Maribel Cornejo PA-C      enoxaparin  1 mg/kg Subcutaneous Q12H [address] Carine Guzman DO      famotidine  20 mg Per NG Tube BID MATHEW Ramirez      fentaNYL  100 mcg/hr Intravenous Continuous [place] MATHEW MICHAEL 100 mcg/hr (01/16/21 0007)    fentanyl citrate (PF)  50 mcg Intravenous Q1H PRN MATHEW Ramirez      fluticasone  2 spray Nasal Daily PRN Indy Loving, CRNP      folic acid  1 mg Oral Daily Adriano Chris MD      glycerin-hypromellose-  1 drop Both Eyes Q3H PRN Chet Blanchard MD      insulin lispro  1-5 Units Subcutaneous Q6H [address] Temitope Sesay MD      levalbuterol  1 25 mg Nebulization TID Gilda Huynh PA-C      losartan  50 mg Oral Daily MATHEW Pandya      melatonin  6 mg Oral HS Indy Loving, CRNP      montelukast  10 mg Oral Daily Candida Loving, CRNP      zinc sulfate  220 mg Oral Daily Candida Loving, CRNP      Followed by   Mckenzie Paul ON 1/17/2021] multivitamin-minerals  1 tablet Oral Daily Candida Loving, CRNP      polyethylene glycol  17 g Oral Daily Iveth Rice PA-C      propofol  5-50 mcg/kg/min Intravenous Titrated Gilda Huynh PA-C 15 mcg/kg/min (01/15/21 2200)    senna-docusate sodium  1 tablet Oral HS Iveth Rice PA-C      sertraline  50 mg Oral Daily Ian Sesay MD      sodium chloride  4 mL Nebulization TID Gilda Huynh PA-C      thiamine  100 mg Per NG Tube Daily Gilda Huynh PA-C      vancomycin  20 mg/kg (Adjusted) Intravenous Q12H MATHEW Pandya       Continuous Infusions:  fentaNYL, 100 mcg/hr, Last Rate: 100 mcg/hr (01/16/21 0007)  propofol, 5-50 mcg/kg/min, Last Rate: 15 mcg/kg/min (01/15/21 2200)      PRN Meds:   albuterol, 2 puff, Q4H PRN  bisacodyl, 10 mg, Daily PRN  dextromethorphan-guaiFENesin, 10 mL, Q4H PRN  fentanyl citrate (PF), 50 mcg, Q1H PRN  fluticasone, 2 spray, Daily PRN  glycerin-hypromellose-, 1 drop, Q3H PRN        Invasive Devices Review  Invasive Devices     Peripheral Intravenous Line            Peripheral IV 01/12/21 Left Forearm 3 days    Long-Dwell Peripheral IV (Midline) 22/39/43 Left Cephalic 2 days    Peripheral IV 01/13/21 Dorsal (posterior); Right Hand 2 days          Drain            NG/OG/Enteral Tube Orogastric 18 Fr Right mouth 2 days    Urethral Catheter Latex 16 Fr  2 days          Airway            ETT  Oral;Hi-Lo; Cuffed 8 mm 2 days                Rationale for remaining devices:  ett/Hillman  ---------------------------------------------------------------------------------------  Advance Directive and Living Will: Yes    Power of :    POLST:    ---------------------------------------------------------------------------------------  Care Time Delivered:   Upon my evaluation, this patient had a high probability of imminent or life-threatening deterioration due to Respiratory failure, which required my direct attention, intervention, and personal management  I have personally provided 35 minutes (0005 to 0040) of critical care time, exclusive of procedures, teaching, family meetings, and any prior time recorded by providers other than myself  MATHEW LEVIN      Portions of the record may have been created with voice recognition software  Occasional wrong word or "sound a like" substitutions may have occurred due to the inherent limitations of voice recognition software    Read the chart carefully and recognize, using context, where substitutions have occurred

## 2021-01-16 NOTE — ASSESSMENT & PLAN NOTE
· Baseline hemoglobin 12  · Status post PRBCs on 01/13 in 1/14  · No signs of acute bleeding  · Will check occult/iron B12 and folate  · CBC in the a m

## 2021-01-16 NOTE — PROGRESS NOTES
Vancomycin IV Pharmacy-to-Dose Consultation    Macarena Zhang is a 78 y o  male who is currently receiving Vancomycin IV with management by the Pharmacy Consult service for the treatment of Pneumonia  Assessment/Plan:    The patient's chart was reviewed  Renal function is stable  There are no signs or symptoms of nephrotoxicity and/or infusion reactions documented  Based on today's assessment, will continue current vancomycin dosing of 1500mg (20mg/kg) Q 12hrs, with a plan for trough to be drawn at 1630 on 1/18  We will continue to follow the patient's culture results and clinical progress daily        Inga Hernandez, PharmD   Pharmacist

## 2021-01-16 NOTE — ASSESSMENT & PLAN NOTE
· Large leukocytes/innumerable wbc's/occasional bacteria  · Did not reflex  · Currently on cefepime which will cover day for antibiotic day 6

## 2021-01-16 NOTE — ASSESSMENT & PLAN NOTE
· Secondary to COVID-19 viral pneumonia concern for superimposed bacterial  · ETT day 4  · Currently on mechanical ventilation changed to pressure support overnight due to breath stacking  · PC 14/14/70/10  · Wean oxygen for saturations greater than 88%  · VAP bundle  · Transition to fentanyl drip only given bradycardia goal RASS 0 to -1  · Dose with lasix x1 for net negative

## 2021-01-16 NOTE — QUICK NOTE
Saima Stains the patient's son was called to let him know that his ventilator settings have continued to decrease and he has been taken down to pressure support today, and has been more alert  His blood pressure has been elevated so we have been giving him medications for that  All questions were answered about inflammatory markers   No additional concerns at this time

## 2021-01-16 NOTE — ASSESSMENT & PLAN NOTE
·   · Cardiology following  · Avoid QTC prolonging medications  · Weaning off propofol  · Daily EKGs

## 2021-01-16 NOTE — ASSESSMENT & PLAN NOTE
· CXR bilateral ground-glass opacities worsened from 01/10  · Severe COVID-19 tx algorithm/Positive 1/4  · BC no growth  MEDICATIONS  · Decadron 0 1 milligram/kilogram D 7/10/Pepcid b i d   · Doxy/ceftriazone escalated to cefepime/Vanco currently day 5 of 5   · Vit C/Zinc x7 days then MV/Vit D  · Atorvastatin transition home dose on discharge   · Enoxaparin therapeutic  · Remdesimir completed 1/14   · Plasma 1/11  · Actemra 1/13 not redosed given down trending inflammatory marker  INFLAMMATORY MARKERS (trend q 48-72)  · CRP -670-145  · Ferritin 0272-2099-9244  · D-Dimer 0 6-93-cdedjqs than 20-12  · Troponin peak 0 3  ·   · CK 34  · PCT 0 26-0 2 1-0 06  · Hepatitis negative  · HIV negative

## 2021-01-16 NOTE — ASSESSMENT & PLAN NOTE
· TSH 0 3 awaiting free T4  · Coreg currently being held  · Weaning off propofol  · Hemodynamically stable  · Cardiology following

## 2021-01-16 NOTE — ASSESSMENT & PLAN NOTE
· Resume home Norvasc  · Resumed Cozaar  · Holding Coreg with bradycardia  · Consider resuming minoxidil if needed

## 2021-01-17 LAB
ANION GAP SERPL CALCULATED.3IONS-SCNC: 6 MMOL/L (ref 4–13)
BASOPHILS # BLD MANUAL: 0 THOUSAND/UL (ref 0–0.1)
BASOPHILS NFR MAR MANUAL: 0 % (ref 0–1)
BUN SERPL-MCNC: 19 MG/DL (ref 5–25)
CALCIUM SERPL-MCNC: 8.2 MG/DL (ref 8.3–10.1)
CHLORIDE SERPL-SCNC: 101 MMOL/L (ref 100–108)
CO2 SERPL-SCNC: 32 MMOL/L (ref 21–32)
CREAT SERPL-MCNC: 0.57 MG/DL (ref 0.6–1.3)
EOSINOPHIL # BLD MANUAL: 0 THOUSAND/UL (ref 0–0.4)
EOSINOPHIL NFR BLD MANUAL: 0 % (ref 0–6)
ERYTHROCYTE [DISTWIDTH] IN BLOOD BY AUTOMATED COUNT: 20.4 % (ref 11.6–15.1)
GFR SERPL CREATININE-BSD FRML MDRD: 106 ML/MIN/1.73SQ M
GLUCOSE SERPL-MCNC: 109 MG/DL (ref 65–140)
GLUCOSE SERPL-MCNC: 113 MG/DL (ref 65–140)
GLUCOSE SERPL-MCNC: 160 MG/DL (ref 65–140)
GLUCOSE SERPL-MCNC: 183 MG/DL (ref 65–140)
GLUCOSE SERPL-MCNC: 88 MG/DL (ref 65–140)
GLUCOSE SERPL-MCNC: 94 MG/DL (ref 65–140)
HCT VFR BLD AUTO: 32.5 % (ref 36.5–49.3)
HGB BLD-MCNC: 9.8 G/DL (ref 12–17)
LYMPHOCYTES # BLD AUTO: 150.59 THOUSAND/UL (ref 0.6–4.47)
LYMPHOCYTES # BLD AUTO: 95 % (ref 14–44)
MAGNESIUM SERPL-MCNC: 2.1 MG/DL (ref 1.6–2.6)
MCH RBC QN AUTO: 30.3 PG (ref 26.8–34.3)
MCHC RBC AUTO-ENTMCNC: 30.2 G/DL (ref 31.4–37.4)
MCV RBC AUTO: 101 FL (ref 82–98)
MONOCYTES # BLD AUTO: 0 THOUSAND/UL (ref 0–1.22)
MONOCYTES NFR BLD: 0 % (ref 4–12)
NEUTROPHILS # BLD MANUAL: 7.93 THOUSAND/UL (ref 1.85–7.62)
NEUTS SEG NFR BLD AUTO: 5 % (ref 43–75)
NRBC BLD AUTO-RTO: 0 /100 WBCS
PLATELET # BLD AUTO: 436 THOUSANDS/UL (ref 149–390)
PLATELET BLD QL SMEAR: ABNORMAL
PMV BLD AUTO: 9.9 FL (ref 8.9–12.7)
POLYCHROMASIA BLD QL SMEAR: PRESENT
POTASSIUM SERPL-SCNC: 5 MMOL/L (ref 3.5–5.3)
RBC # BLD AUTO: 3.23 MILLION/UL (ref 3.88–5.62)
SODIUM SERPL-SCNC: 139 MMOL/L (ref 136–145)
TOTAL CELLS COUNTED SPEC: 100
WBC # BLD AUTO: 158.52 THOUSAND/UL (ref 4.31–10.16)

## 2021-01-17 PROCEDURE — 94003 VENT MGMT INPAT SUBQ DAY: CPT

## 2021-01-17 PROCEDURE — 94760 N-INVAS EAR/PLS OXIMETRY 1: CPT

## 2021-01-17 PROCEDURE — 80048 BASIC METABOLIC PNL TOTAL CA: CPT | Performed by: INTERNAL MEDICINE

## 2021-01-17 PROCEDURE — 94640 AIRWAY INHALATION TREATMENT: CPT

## 2021-01-17 PROCEDURE — 94002 VENT MGMT INPAT INIT DAY: CPT

## 2021-01-17 PROCEDURE — 83735 ASSAY OF MAGNESIUM: CPT | Performed by: INTERNAL MEDICINE

## 2021-01-17 PROCEDURE — 94150 VITAL CAPACITY TEST: CPT

## 2021-01-17 PROCEDURE — 85007 BL SMEAR W/DIFF WBC COUNT: CPT | Performed by: INTERNAL MEDICINE

## 2021-01-17 PROCEDURE — 82948 REAGENT STRIP/BLOOD GLUCOSE: CPT

## 2021-01-17 PROCEDURE — 93005 ELECTROCARDIOGRAM TRACING: CPT

## 2021-01-17 PROCEDURE — 94762 N-INVAS EAR/PLS OXIMTRY CONT: CPT

## 2021-01-17 PROCEDURE — 85027 COMPLETE CBC AUTOMATED: CPT | Performed by: INTERNAL MEDICINE

## 2021-01-17 PROCEDURE — 94660 CPAP INITIATION&MGMT: CPT

## 2021-01-17 PROCEDURE — 99291 CRITICAL CARE FIRST HOUR: CPT | Performed by: INTERNAL MEDICINE

## 2021-01-17 RX ORDER — CARVEDILOL 6.25 MG/1
6.25 TABLET ORAL 2 TIMES DAILY WITH MEALS
Status: DISCONTINUED | OUTPATIENT
Start: 2021-01-17 | End: 2021-01-18

## 2021-01-17 RX ORDER — LORAZEPAM 0.5 MG/1
0.5 TABLET ORAL EVERY 8 HOURS PRN
Status: DISCONTINUED | OUTPATIENT
Start: 2021-01-17 | End: 2021-01-21 | Stop reason: HOSPADM

## 2021-01-17 RX ORDER — FUROSEMIDE 10 MG/ML
20 INJECTION INTRAMUSCULAR; INTRAVENOUS ONCE
Status: COMPLETED | OUTPATIENT
Start: 2021-01-17 | End: 2021-01-17

## 2021-01-17 RX ADMIN — CARVEDILOL 6.25 MG: 6.25 TABLET, FILM COATED ORAL at 15:50

## 2021-01-17 RX ADMIN — HYDRALAZINE HYDROCHLORIDE 20 MG: 20 INJECTION, SOLUTION INTRAMUSCULAR; INTRAVENOUS at 00:26

## 2021-01-17 RX ADMIN — CHLORHEXIDINE GLUCONATE 0.12% ORAL RINSE 15 ML: 1.2 LIQUID ORAL at 22:00

## 2021-01-17 RX ADMIN — ENOXAPARIN SODIUM 80 MG: 80 INJECTION SUBCUTANEOUS at 22:00

## 2021-01-17 RX ADMIN — INSULIN LISPRO 1 UNITS: 100 INJECTION, SOLUTION INTRAVENOUS; SUBCUTANEOUS at 06:05

## 2021-01-17 RX ADMIN — LEVALBUTEROL HYDROCHLORIDE 1.25 MG: 1.25 SOLUTION, CONCENTRATE RESPIRATORY (INHALATION) at 07:08

## 2021-01-17 RX ADMIN — ASPIRIN 81 MG: 81 TABLET ORAL at 08:29

## 2021-01-17 RX ADMIN — GLYCERIN 1 DROP: .002; .002; .01 SOLUTION/ DROPS OPHTHALMIC at 13:39

## 2021-01-17 RX ADMIN — SODIUM CHLORIDE SOLN NEBU 3% 4 ML: 3 NEBU SOLN at 07:08

## 2021-01-17 RX ADMIN — DEXAMETHASONE SODIUM PHOSPHATE 8.5 MG: 10 INJECTION, SOLUTION INTRAMUSCULAR; INTRAVENOUS at 08:28

## 2021-01-17 RX ADMIN — FAMOTIDINE 20 MG: 40 POWDER, FOR SUSPENSION ORAL at 18:09

## 2021-01-17 RX ADMIN — FAMOTIDINE 20 MG: 40 POWDER, FOR SUSPENSION ORAL at 08:29

## 2021-01-17 RX ADMIN — MONTELUKAST SODIUM 10 MG: 10 TABLET, FILM COATED ORAL at 08:30

## 2021-01-17 RX ADMIN — DEXAMETHASONE SODIUM PHOSPHATE 8.5 MG: 10 INJECTION, SOLUTION INTRAMUSCULAR; INTRAVENOUS at 22:00

## 2021-01-17 RX ADMIN — ATORVASTATIN CALCIUM 40 MG: 40 TABLET, FILM COATED ORAL at 22:00

## 2021-01-17 RX ADMIN — AMLODIPINE BESYLATE 5 MG: 5 TABLET ORAL at 18:09

## 2021-01-17 RX ADMIN — POLYETHYLENE GLYCOL 3350 17 G: 17 POWDER, FOR SOLUTION ORAL at 08:29

## 2021-01-17 RX ADMIN — HYDRALAZINE HYDROCHLORIDE 20 MG: 20 INJECTION, SOLUTION INTRAMUSCULAR; INTRAVENOUS at 06:31

## 2021-01-17 RX ADMIN — FOLIC ACID 1 MG: 1 TABLET ORAL at 08:30

## 2021-01-17 RX ADMIN — FUROSEMIDE 20 MG: 10 INJECTION, SOLUTION INTRAMUSCULAR; INTRAVENOUS at 15:54

## 2021-01-17 RX ADMIN — ENOXAPARIN SODIUM 80 MG: 80 INJECTION SUBCUTANEOUS at 08:29

## 2021-01-17 RX ADMIN — THIAMINE HCL TAB 100 MG 100 MG: 100 TAB at 08:29

## 2021-01-17 RX ADMIN — Medication 1 TABLET: at 08:29

## 2021-01-17 RX ADMIN — HYDRALAZINE HYDROCHLORIDE 20 MG: 20 INJECTION, SOLUTION INTRAMUSCULAR; INTRAVENOUS at 12:13

## 2021-01-17 RX ADMIN — CHLORHEXIDINE GLUCONATE 0.12% ORAL RINSE 15 ML: 1.2 LIQUID ORAL at 08:29

## 2021-01-17 RX ADMIN — MELATONIN 6 MG: at 22:00

## 2021-01-17 RX ADMIN — Medication 100 MCG/HR: at 05:42

## 2021-01-17 RX ADMIN — Medication 2000 UNITS: at 08:29

## 2021-01-17 RX ADMIN — DOCUSATE SODIUM AND SENNOSIDES 1 TABLET: 8.6; 5 TABLET ORAL at 22:00

## 2021-01-17 RX ADMIN — SERTRALINE HYDROCHLORIDE 50 MG: 50 TABLET ORAL at 08:29

## 2021-01-17 NOTE — PROGRESS NOTES
Daily Progress Note - [facility] Dina Willams 78 y o  male MRN: [id number]  Unit/Bed#: ICU 08 Encounter: [id number]        ----------------------------------------------------------------------------------------  HPI/24hr events: On pressure support, PEEP down from 8 to 6, saturating fine patient is more awake and alert    ---------------------------------------------------------------------------------------  SUBJECTIVE  Patient is vent, but is able to communicate via writing and nods  Vent comfortable but otherwise no pain in chest or abdomen or anywhere else  No complaints in urine or bowel  Most concerned about when he will be extubated as he was told that it will be this morning  Review of Systems  Review of systems was reviewed and negative unless stated above in HPI/24-hour events   ---------------------------------------------------------------------------------------  Assessment and Plan:    Neuro:    Alcohol abuse  o No withdrawal noted at this time  o Thiamine  o Folate  o Restraints   · Mild depression  · Continue Zoloft  · Maintain sleep-wake cycle with melatonin 6 mg q h s  · Fentanyl drip 100mcg/hr,  will wean? CV:    Cardiology following   QT prolongation 697  o Avoid QT prolonging medication  o Has been off propofol for more than 1 day, continue to hold as he will likely be extubated  o Daily EKGs?    Sinus bradycardia  o Cardiology agrees that it is likely due to propofol infusion, this has been continued for greater than 24 hours, no bradycardia events recently  o If patient develops high-degree AV block or symptomatic bradycardia cardiology suggest transvenous pacer  ·  Nonsustained V-tach  · Multiple ectopy rounds yesterday evening, only 1 that was 5 beats  · Will observe   · No treatment as per Cardiology at his it may worsen bradycardia  · Hypertension with elevated blood pressures  · Hydralazine p r n   · Hold losartan as patient had hyperkalemia yesterday  · Hold Coreg for bradycardia, no bradycardia for greater than 24 hours  · Continue amlodipine 5 mg q d  · Hyperlipidemia  · Continue atorvastatin 40    Pulm:   Acute hypoxic respiratory failure due to COVID-19 pneumonia #Day 5 Vent, Day 8/10 of COVID protocol  o On pressure support: 5/8 , decreased PEEP to 6  o Patient is awake alert  o Possible extubation later this today  o Atorvastatin 40 mg q d   o Vitamin-D  o Famotidine 20 mg  o Status post from the severe completed 1/14  o Status post Remdesivir completed 1/13  o Status post going to plasma 1/12   Pulmonary hypertension   o RA pressure of 75   o Will require outpatient follow-up       GI:    Diverticulitis  o Noted on CT that she has uncomplicated sigmoid diverticulitis   · Transaminitis with elevated bili  · Right upper quadrant/CT shows cholelithiasis  · Observe, monitor    :    Acute cystitis without hematuria Day 4 of cefepime  o UA showed large leukocytes/innumerable WBCs/occasional bacteremia which did not reflex  o Currently on cefepime   BPH with obstruction   o Flomax on as they are unable to flush it, can resume once patient is eating     F/E/N:    Electrolytes normal/hyperkalemia resolved   On tube feeds -Jevity 1 2      Heme/Onc:    Anemia  o Baseline hemoglobin 12  o Status post 2 units RBCs, hemoglobin 9 8 status post transfusion  o No bleeding source noted   o On DVT prophylaxis therapeutic Lovenox  · CLL  · Lymphocytes increased from 71  5   · WBC increased from 86 46 to 125 45 to 158 52   · hemoconcentration of CBC?   · Monitor    Endo:    Hyperglycemia secondary to steroids  o Continue SSI      ID:    COVID-19  o See above   Acute cystitis without hematuria ,   o See above      MSK/Skin:   o None      Disposition: Continue Critical Care   Code Status: Level 1 - Full Code  ---------------------------------------------------------------------------------------  ICU CORE MEASURES    Prophylaxis   VTE Pharmacologic Prophylaxis: Enoxaparin (Lovenox)  VTE Mechanical Prophylaxis: sequential compression device and foot pump applied  Stress Ulcer Prophylaxis: Famotidine PO    ABCDE Protocol (if indicated)  Plan to perform spontaneous awakening trial today? Not applicable  Plan to perform spontaneous breathing trial today? May be later today if patient does well on lower PEEP  Obvious barriers to extubation? See above  CAM-ICU: Negative    Invasive Devices Review  Invasive Devices     Peripheral Intravenous Line            Peripheral IV 21 Dorsal (posterior); Right Hand 4 days    Long-Dwell Peripheral IV (Midline)  Left Cephalic 3 days    Peripheral IV 21 Right Forearm less than 1 day          Drain            NG/OG/Enteral Tube Orogastric 18 Fr Right mouth 4 days    Urethral Catheter Latex 16 Fr  3 days          Airway            ETT  Oral;Hi-Lo; Cuffed 8 mm 4 days              Can any invasive devices be discontinued today? Yes, likely to be extubated   ---------------------------------------------------------------------------------------  OBJECTIVE    Vitals   Vitals:    21 0358 21 0400 21 0500 21 0600   BP:  169/80 (!) 175/81    BP Location:       Pulse:  71 68    Resp:  13 18    Temp:       TempSrc:       SpO2: 96% 97% 96%    Weight:    88 8 kg (195 lb 12 3 oz)   Height:         Temp (24hrs), Av 5 °F (36 4 °C), Min:97 2 °F (36 2 °C), Max:97 7 °F (36 5 °C)  Current: Temperature: 97 5 °F (36 4 °C)  HR: 68  BP: 175/81  RR: 18  SpO2: 95%    Respiratory:  Negative  Nasal Cannula O2 Flow Rate (L/min): 10 L/min    Invasive/non-invasive ventilation settings   Respiratory    Lab Data (Last 4 hours)    None         O2/Vent Data (Last 4 hours)       0358           Vent Mode CPAP/PS Spont       FIO2 (%) (%) 50       PEEP (cmH2O) (cmH2O) 8       Pressure Support (cmH2O) (cmH20) 5       MV (Obs) 9 64       RSBI 19                   Physical Exam  Vitals signs and nursing note reviewed     Constitutional: General: He is not in acute distress  Appearance: Normal appearance  He is not ill-appearing  Comments: Awake , alert      HENT:      Right Ear: External ear normal       Left Ear: External ear normal    Eyes:      General: No scleral icterus  Right eye: No discharge  Left eye: No discharge  Cardiovascular:      Rate and Rhythm: Normal rate and regular rhythm  Pulses: Normal pulses  Heart sounds: No murmur  No friction rub  Comments: Loud heart sound  Pulmonary:      Effort: Pulmonary effort is normal       Breath sounds: Rales present  Abdominal:      General: Abdomen is flat  Bowel sounds are normal  There is no distension  Tenderness: There is no abdominal tenderness  There is no guarding  Genitourinary:     Comments: Urine is yelliw   Musculoskeletal:      Right lower leg: No edema  Left lower leg: No edema  Skin:     General: Skin is warm  Coloration: Skin is not jaundiced  Findings: No bruising, lesion or rash  Neurological:      Mental Status: He is alert and oriented to person, place, and time        Comments: Able to write and questions appropriately   Random blinking   Knows year/month/day with head nods   Moves all limbs    Psychiatric:         Mood and Affect: Mood normal          Behavior: Behavior normal          Laboratory and Diagnostics:  Results from last 7 days   Lab Units 01/17/21  0403 01/16/21  0358 01/15/21  0324 01/14/21  1628 01/14/21  0558 01/13/21  2341 01/13/21  1151 01/13/21  0757 01/12/21  1609 01/12/21  0352 01/11/21  0425  01/10/21  0853   WBC Thousand/uL 158 52* 125 45* 86 46*  --  74 16*  --  70 15* 84 73* 110 11* 105 45* 65 14*  --  50 92*   HEMOGLOBIN g/dL 9 8* 8 8* 7 2* 7 2* 7 2* 6 7* 5 8* 6 8* 7 8* 7 5* 8 7*  --  9 1*   I STAT HEMOGLOBIN   --   --   --   --   --   --   --   --   --   --   --    < >  --    HEMATOCRIT % 32 5* 30 2* 23 7* 23 6* 22 8* 21 5* 19 3* 21 9* 25 7* 23 9* 26 9*  --  26 5*   HEMATOCRIT, ISTAT   --   --   --   --   --   --   --   --   --   --   --    < >  --    PLATELETS Thousands/uL 436* 384 276  --  266  --  293 318 359 327 237   < > 241   NEUTROS PCT %  --  7*  --   --  9*  --   --   --   --  11*  --   --   --    BANDS PCT %  --   --   --   --   --   --   --   --   --   --  2  --  1   MONOS PCT %  --  0*  --   --  1*  --   --   --   --  0*  --   --   --    MONO PCT % 0*  --  0*  --   --   --   --  1* 1*  --  5  --  1*    < > = values in this interval not displayed  Results from last 7 days   Lab Units 01/17/21  0403 01/16/21  1127 01/16/21  0358 01/15/21  0324 01/14/21  1628 01/14/21  0558 01/13/21  2340 01/13/21  1121 01/13/21  0511 01/12/21  0352  01/11/21  0425   SODIUM mmol/L 139 138 137 136 135* 134* 133* 132* 133* 129*   < >  --    POTASSIUM mmol/L 5 0 5 3 5 6* 4 7 4 6 4 9 4 9 4 8 4 4 4 5   < >  --    CHLORIDE mmol/L 101 102 104 105 103 102 102 101 99* 97*   < >  --    CO2 mmol/L 32 30 29 26 26 24 25 26 27 25   < >  --    ANION GAP mmol/L 6 6 4 5 6 8 6 5 7 7   < >  --    BUN mg/dL 19 19 19 18 19 21 22 20 23 18   < >  --    CREATININE mg/dL 0 57* 0 58* 0 74 0 65 0 66 0 61 0 71 0 77 0 72 0 75   < >  --    CALCIUM mg/dL 8 2* 8 1* 7 9* 7 9* 7 8* 7 8* 7 7* 8 0* 7 6* 8 3   < >  --    GLUCOSE RANDOM mg/dL 160* 124 138 159* 190* 206* 159* 120 118 150*   < >  --    ALT U/L  --   --  77 94*  --  99*  --  134* 124* 162*  --  73   AST U/L  --   --  34 36  --  67*  --  85* 103* 174*  --  100*   ALK PHOS U/L  --   --  127* 116  --  115  --  137* 132* 133*  --  107   ALBUMIN g/dL  --   --  2 5* 2 1*  --  2 0*  --  2 3* 2 2* 2 5*  --  2 5*   TOTAL BILIRUBIN mg/dL  --   --  1 61* 1 75*  --  2 48*  --  2 85* 2 27* 2 22*  --  1 49*    < > = values in this interval not displayed       Results from last 7 days   Lab Units 01/17/21  0403 01/16/21  0358 01/15/21  0324 01/14/21  0558 01/13/21  0511 01/11/21  0425 01/10/21  0852   MAGNESIUM mg/dL 2 1 2 2 2 5 2 5 2 0 2 0 1 9   PHOSPHORUS mg/dL  --   --  3 5  -- --  3 4  --       Results from last 7 days   Lab Units 01/13/21  2340 01/10/21  0852   INR  1 61* 1 18*   PTT seconds  --  35*      Results from last 7 days   Lab Units 01/14/21  0559 01/14/21  0254 01/13/21  2340 01/10/21  0852   TROPONIN I ng/mL 0 22* 0 28* 0 34* <0 03     Results from last 7 days   Lab Units 01/10/21  0852   LACTIC ACID mmol/L 0 7     ABG:  Results from last 7 days   Lab Units 01/15/21  2221   PH ART  7 373   PCO2 ART mm Hg 45 3*   PO2 ART mm Hg 75 3   HCO3 ART mmol/L 25 8   BASE EXC ART mmol/L 0 4   ABG SOURCE  Radial, Left     VBG:  Results from last 7 days   Lab Units 01/15/21  2221   ABG SOURCE  Radial, Left     Results from last 7 days   Lab Units 01/14/21  0558 01/13/21  0757 01/11/21  0425 01/10/21  1250 01/10/21  0852   PROCALCITONIN ng/ml 0 06 0 12 0 14 0 21 0 26*       Micro  Results from last 7 days   Lab Units 01/15/21  1034 01/13/21  0758 01/10/21  1423 01/10/21  1251 01/10/21  1012 01/10/21  0852   BLOOD CULTURE   --   --  No Growth After 5 Days  No Growth After 5 Days  No Growth After 5 Days  No Growth After 5 Days     SPUTUM CULTURE   --  No growth  --   --   --   --    GRAM STAIN RESULT   --  No Epithelial cells per low power field  No Polys or Bacteria seen  --   --   --   --    MRSA CULTURE ONLY  No Methicillin Resistant Staphlyococcus aureus (MRSA) isolated  --   --   --   --   --        EKG: No new   Imaging:  no new     Intake and Output  I/O       01/15 0701 - 01/16 0700 01/16 0701 - 01/17 0700 01/17 0701 - 01/18 0700    I V  (mL/kg) 575 8 (6 5) 320 2 (3 6)     NG/ 150     IV Piggyback 350 300     Feedings 400 700     Total Intake(mL/kg) 1535 8 (17 3) 1470 2 (16 6)     Urine (mL/kg/hr) 4310 (2) 4290 (2)     Stool 0 0     Total Output 4310 4290     Net -2774 2 -2819 8            Unmeasured Stool Occurrence 1 x 1 x           Height and Weights   Height: 5' 6" (167 6 cm)(most recent ht in chart from 11/10/20 encounter)  IBW: 63 8 kg  Body mass index is 31 6 kg/m²  Weight (last 2 days)     Date/Time   Weight    01/17/21 0600   88 8 (195 77)    01/16/21 0600   88 8 (195 77)    01/15/21 0541   88 8 (195 77)                Nutrition       Diet Orders   (From admission, onward)             Start     Ordered    01/15/21 2150  Diet Enteral/Parenteral; Tube Feeding No Oral Diet; Jevity 1 2 Chad; Continuous; 40; Prosource Protein Liquid - Two Packets  Diet effective now     Question Answer Comment   Diet Type Enteral/Parenteral    Enteral/Parenteral Tube Feeding No Oral Diet    Tube Feeding Formula: Jevity 1 2 Chad    Bolus/Cyclic/Continuous Continuous    Tube Feeding Goal Rate (mL/hr): 40    Prosource Protein Liquid - No Carb Prosource Protein Liquid - Two Packets    RD to adjust diet per protocol?  Yes        01/15/21 2149    01/10/21 1624  Room Service  Once     Question:  Type of Service  Answer:  Room Service - Appropriate with Assistance    01/10/21 1623                  Active Medications  Scheduled Meds:  Current Facility-Administered Medications   Medication Dose Route Frequency Provider Last Rate    albuterol  2 puff Inhalation Q4H PRN MATHEW Gomez      amLODIPine  5 mg Oral QPM Dierdre Dress, CRNP      aspirin  81 mg Oral Daily MATHEW Gomez      atorvastatin  40 mg Per NG Tube HS Dwight Peacock PA-C      bisacodyl  10 mg Rectal Daily PRN MATHEW Jain      chlorhexidine  15 mL Swish & Spit Q12H [facility], [address]      cholecalciferol  2,000 Units Per NG Tube Daily Dwight Peacock PA-C      dexamethasone  0 1 mg/kg Intravenous Q12H MATHEW Gomez      dextromethorphan-guaiFENesin  10 mL Oral Q4H PRN Pamela Muniz PA-C      enoxaparin  1 mg/kg Subcutaneous Q12H [facility] Braden Pitt,       famotidine  20 mg Per NG Tube BID Murleen Coins, CRFATOU      fentaNYL  100 mcg/hr Intravenous Continuous Dierdre Dress, CRNP 100 mcg/hr (01/17/21 0542)    fentanyl citrate (PF)  50 mcg Intravenous Q1H PRN [place] Nadiya, MATHEW      fluticasone  2 spray Nasal Daily PRN Belkis Quails, CRNP      folic acid  1 mg Oral Daily Boy García MD      glycerin-hypromellose-  1 drop Both Eyes Q3H PRN Roberta Townsend MD      hydrALAZINE  20 mg Intravenous Q6H PRN Emmanuel Rodriguez PA-C      insulin lispro  1-5 Units Subcutaneous Q6H [facility] Temitope Lam MD      levalbuterol  1 25 mg Nebulization TID Vinh Zee PA-C      melatonin  6 mg Oral HS Belkis Quails, CRFATOU      montelukast  10 mg Oral Daily Belkis Quails, CRNP      multivitamin-minerals  1 tablet Oral Daily Belkis Quails, CRNP      polyethylene glycol  17 g Oral Daily Emry [place], [place]      senna-docusate sodium  1 tablet Oral HS Emrjose Hong PA-C      sertraline  50 mg Oral Daily Boy García MD      sodium chloride  4 mL Nebulization TID Vinh Zee PA-C      thiamine  100 mg Per NG Tube Daily Vinh Zee PA-C       Continuous Infusions:  fentaNYL, 100 mcg/hr, Last Rate: 100 mcg/hr (01/17/21 0542)      PRN Meds:   albuterol, 2 puff, Q4H PRN  bisacodyl, 10 mg, Daily PRN  dextromethorphan-guaiFENesin, 10 mL, Q4H PRN  fentanyl citrate (PF), 50 mcg, Q1H PRN  fluticasone, 2 spray, Daily PRN  glycerin-hypromellose-, 1 drop, Q3H PRN  hydrALAZINE, 20 mg, Q6H PRN        Allergies   Allergies   Allergen Reactions    Codeine Other (See Comments)     agitated    Sulfamethoxazole-Trimethoprim GI Intolerance and Abdominal Pain     upset stomach     ---------------------------------------------------------------------------------------  Advance Directive and Living Will: Yes    Power of :    POLST:    ---------------------------------------------------------------------------------------  Care Time Delivered:   No Critical Care time spent     Kervin Del Castillo MD      Portions of the record may have been created with voice recognition software  Occasional wrong word or "sound a like" substitutions may have occurred due to the inherent limitations of voice recognition software    Read the chart carefully and recognize, using context, where substitutions have occurred

## 2021-01-17 NOTE — CONSULTS
Vancomycin IV Pharmacy-to-Dose Consultation    Cody Galan is a 78 y o  male who was receiving Vancomycin IV with management by the Pharmacy Consult service for treatment of Pneumonia  The patient's Vancomycin therapy has been completed / discontinued  Thank you for allowing us to take part in this patient's care  Pharmacy will sign-off now; please call or re-consult if there are any questions          Iesha Mcgarry, PharmD  Pharmacist

## 2021-01-17 NOTE — QUICK NOTE
Spoke to [employer]  Informed him that his dad is extubated and thus far stable  Updated him on plan and monitoring  All questions answered

## 2021-01-18 ENCOUNTER — TELEPHONE (OUTPATIENT)
Dept: FAMILY MEDICINE CLINIC | Facility: CLINIC | Age: 71
End: 2021-01-18

## 2021-01-18 LAB
ALBUMIN SERPL BCP-MCNC: 3 G/DL (ref 3.5–5)
ALP SERPL-CCNC: 133 U/L (ref 46–116)
ALT SERPL W P-5'-P-CCNC: 83 U/L (ref 12–78)
ANION GAP SERPL CALCULATED.3IONS-SCNC: 6 MMOL/L (ref 4–13)
AST SERPL W P-5'-P-CCNC: 46 U/L (ref 5–45)
BILIRUB SERPL-MCNC: 2.1 MG/DL (ref 0.2–1)
BUN SERPL-MCNC: 26 MG/DL (ref 5–25)
CALCIUM ALBUM COR SERPL-MCNC: 9.5 MG/DL (ref 8.3–10.1)
CALCIUM SERPL-MCNC: 8.7 MG/DL (ref 8.3–10.1)
CHLORIDE SERPL-SCNC: 97 MMOL/L (ref 100–108)
CO2 SERPL-SCNC: 34 MMOL/L (ref 21–32)
CREAT SERPL-MCNC: 0.66 MG/DL (ref 0.6–1.3)
ERYTHROCYTE [DISTWIDTH] IN BLOOD BY AUTOMATED COUNT: 20.9 % (ref 11.6–15.1)
GFR SERPL CREATININE-BSD FRML MDRD: 99 ML/MIN/1.73SQ M
GLUCOSE SERPL-MCNC: 117 MG/DL (ref 65–140)
GLUCOSE SERPL-MCNC: 136 MG/DL (ref 65–140)
GLUCOSE SERPL-MCNC: 148 MG/DL (ref 65–140)
GLUCOSE SERPL-MCNC: 90 MG/DL (ref 65–140)
HCT VFR BLD AUTO: 33.9 % (ref 36.5–49.3)
HGB BLD-MCNC: 9.9 G/DL (ref 12–17)
MCH RBC QN AUTO: 29.7 PG (ref 26.8–34.3)
MCHC RBC AUTO-ENTMCNC: 29.2 G/DL (ref 31.4–37.4)
MCV RBC AUTO: 102 FL (ref 82–98)
PLATELET # BLD AUTO: 364 THOUSANDS/UL (ref 149–390)
PMV BLD AUTO: 10 FL (ref 8.9–12.7)
POTASSIUM SERPL-SCNC: 5.3 MMOL/L (ref 3.5–5.3)
PROT SERPL-MCNC: 7.1 G/DL (ref 6.4–8.2)
RBC # BLD AUTO: 3.33 MILLION/UL (ref 3.88–5.62)
SODIUM SERPL-SCNC: 137 MMOL/L (ref 136–145)
WBC # BLD AUTO: 164.9 THOUSAND/UL (ref 4.31–10.16)

## 2021-01-18 PROCEDURE — 94760 N-INVAS EAR/PLS OXIMETRY 1: CPT

## 2021-01-18 PROCEDURE — 85027 COMPLETE CBC AUTOMATED: CPT | Performed by: INTERNAL MEDICINE

## 2021-01-18 PROCEDURE — 94003 VENT MGMT INPAT SUBQ DAY: CPT

## 2021-01-18 PROCEDURE — 99232 SBSQ HOSP IP/OBS MODERATE 35: CPT | Performed by: INTERNAL MEDICINE

## 2021-01-18 PROCEDURE — 82948 REAGENT STRIP/BLOOD GLUCOSE: CPT

## 2021-01-18 PROCEDURE — 99233 SBSQ HOSP IP/OBS HIGH 50: CPT | Performed by: INTERNAL MEDICINE

## 2021-01-18 PROCEDURE — 80053 COMPREHEN METABOLIC PANEL: CPT | Performed by: INTERNAL MEDICINE

## 2021-01-18 RX ORDER — ACETAMINOPHEN 325 MG/1
650 TABLET ORAL EVERY 6 HOURS PRN
Status: DISCONTINUED | OUTPATIENT
Start: 2021-01-18 | End: 2021-01-21 | Stop reason: HOSPADM

## 2021-01-18 RX ORDER — FAMOTIDINE 20 MG/1
20 TABLET, FILM COATED ORAL 2 TIMES DAILY
Status: DISCONTINUED | OUTPATIENT
Start: 2021-01-18 | End: 2021-01-19

## 2021-01-18 RX ADMIN — CHLORHEXIDINE GLUCONATE 0.12% ORAL RINSE 15 ML: 1.2 LIQUID ORAL at 20:37

## 2021-01-18 RX ADMIN — MONTELUKAST SODIUM 10 MG: 10 TABLET, FILM COATED ORAL at 09:06

## 2021-01-18 RX ADMIN — THIAMINE HCL TAB 100 MG 100 MG: 100 TAB at 09:06

## 2021-01-18 RX ADMIN — LORAZEPAM 0.5 MG: 0.5 TABLET ORAL at 23:45

## 2021-01-18 RX ADMIN — DEXAMETHASONE SODIUM PHOSPHATE 8.5 MG: 10 INJECTION, SOLUTION INTRAMUSCULAR; INTRAVENOUS at 21:01

## 2021-01-18 RX ADMIN — CARVEDILOL 6.25 MG: 6.25 TABLET, FILM COATED ORAL at 09:06

## 2021-01-18 RX ADMIN — AMLODIPINE BESYLATE 5 MG: 5 TABLET ORAL at 18:22

## 2021-01-18 RX ADMIN — LORAZEPAM 0.5 MG: 0.5 TABLET ORAL at 09:06

## 2021-01-18 RX ADMIN — FAMOTIDINE 20 MG: 20 TABLET ORAL at 12:39

## 2021-01-18 RX ADMIN — Medication 1 TABLET: at 09:06

## 2021-01-18 RX ADMIN — ACETAMINOPHEN 650 MG: 325 TABLET, FILM COATED ORAL at 23:44

## 2021-01-18 RX ADMIN — SERTRALINE HYDROCHLORIDE 50 MG: 50 TABLET ORAL at 09:06

## 2021-01-18 RX ADMIN — MELATONIN 6 MG: at 21:00

## 2021-01-18 RX ADMIN — FAMOTIDINE 20 MG: 20 TABLET ORAL at 18:22

## 2021-01-18 RX ADMIN — ENOXAPARIN SODIUM 80 MG: 80 INJECTION SUBCUTANEOUS at 09:07

## 2021-01-18 RX ADMIN — FOLIC ACID 1 MG: 1 TABLET ORAL at 09:06

## 2021-01-18 RX ADMIN — ASPIRIN 81 MG: 81 TABLET ORAL at 09:07

## 2021-01-18 RX ADMIN — METOPROLOL TARTRATE 25 MG: 25 TABLET, FILM COATED ORAL at 21:00

## 2021-01-18 RX ADMIN — DOCUSATE SODIUM AND SENNOSIDES 1 TABLET: 8.6; 5 TABLET ORAL at 21:02

## 2021-01-18 RX ADMIN — LORAZEPAM 0.5 MG: 0.5 TABLET ORAL at 00:11

## 2021-01-18 RX ADMIN — DEXAMETHASONE SODIUM PHOSPHATE 8.5 MG: 10 INJECTION, SOLUTION INTRAMUSCULAR; INTRAVENOUS at 09:07

## 2021-01-18 RX ADMIN — ENOXAPARIN SODIUM 80 MG: 80 INJECTION SUBCUTANEOUS at 20:40

## 2021-01-18 RX ADMIN — POLYETHYLENE GLYCOL 3350 17 G: 17 POWDER, FOR SOLUTION ORAL at 09:06

## 2021-01-18 RX ADMIN — GLYCERIN 1 DROP: .002; .002; .01 SOLUTION/ DROPS OPHTHALMIC at 12:40

## 2021-01-18 RX ADMIN — Medication 2000 UNITS: at 12:39

## 2021-01-18 RX ADMIN — ATORVASTATIN CALCIUM 40 MG: 40 TABLET, FILM COATED ORAL at 21:00

## 2021-01-18 NOTE — PROGRESS NOTES
Cardiology Progress Note - Marine Cam 78 y o  male MRN: [id number]    Unit/Bed#: ICU 08 Encounter: [id number]    Assessment and plan  1  Sinus bradycardia resolved  2  Nonsustained ventricular tachycardia  3  COVID pneumonia  4  QT prolongation  5  Acute hypoxemic respiratory failure secondary to 3    6  Obstructive sleep apnea  7  CLL    Recommendations: The bradycardia has resolved likely secondary due to propofol infusion  He is now extubated  Heart rates in the 70s to 80s  There is still brief nonsustained ventricular tachycardia noted on telemetry would trial low-dose of metoprolol tartrate today watch closely on telemetry  Keep potassium and magnesium corrected  Subjective:    No significant events overnight  Extubated this a m  No reported anginal chest pain  Telemetry reviewed no high-degree AV block, resolution of sinus bradycardia    ROS    Objective:   Vitals: Blood pressure 156/84, pulse 68, temperature 97 5 °F (36 4 °C), temperature source Axillary, resp  rate 17, height 5' 6" (1 676 m), weight 88 8 kg (195 lb 12 3 oz), SpO2 93 %  , Body mass index is 31 6 kg/m² ,   Orthostatic Blood Pressures      Most Recent Value   Blood Pressure  156/84 filed at 01/18/2021 0600   Patient Position - Orthostatic VS  Lying filed at 01/18/2021 8461         Systolic (26IRM), ZGI:026 , Min:143 , SIO:680     Diastolic (27RYV), IUA:95, Min:72, Max:101      Intake/Output Summary (Last 24 hours) at 1/18/2021 0803  Last data filed at 1/18/2021 0400  Gross per 24 hour   Intake 321 ml   Output 2840 ml   Net -2519 ml     Weight (last 2 days)     Date/Time   Weight    01/18/21 0542   88 8 (195 77)    01/17/21 0600   88 8 (195 77)    01/16/21 0600   88 8 (195 77)                Telemetry Review: No significant arrhythmias seen on telemetry review  EKG personally reviewed by Xiomara Simons DO       Physical Exam  Physical Exam:  Vital signs reviewed  General:  Alert , appears stated age, respiratory status labored but just extubated  HEENT:  No scleral icterus, no conjunctival pallor  Neck:  Normal range of motion  Lungs:  Just extubated, breathing labored  Abdomen:  No evidence of distension   Extremities:  Normal range of motion    No clubbing, cyanosis or edema   Skin:  No rashes or lesions on exposed skin  Neurologic:  Cranial nerves II-XII grossly intact without focal deficits  Psych:  Normal mood and affect        Laboratory Results:  Results from last 7 days   Lab Units 01/14/21  0559 01/14/21  0254 01/13/21  2340   TROPONIN I ng/mL 0 22* 0 28* 0 34*       CBC with diff:   Results from last 7 days   Lab Units 01/18/21  0600 01/17/21  0403 01/16/21  0358 01/15/21  0324 01/14/21  1628 01/14/21  0558 01/13/21  2341 01/13/21  1151 01/13/21  0757 01/12/21  1609 01/12/21  0352   WBC Thousand/uL 164 90* 158 52* 125 45* 86 46*  --  74 16*  --  70 15* 84 73* 110 11* 105 45*   HEMOGLOBIN g/dL 9 9* 9 8* 8 8* 7 2* 7 2* 7 2* 6 7* 5 8* 6 8* 7 8* 7 5*   HEMATOCRIT % 33 9* 32 5* 30 2* 23 7* 23 6* 22 8* 21 5* 19 3* 21 9* 25 7* 23 9*   MCV fL 102* 101* 102* 101*  --  99*  --  104* 103* 101* 101*   PLATELETS Thousands/uL 364 436* 384 276  --  266  --  293 318 359 327   MCH pg 29 7 30 3 29 7 30 6  --  31 3  --  31 2 32 1 30 6 31 6   MCHC g/dL 29 2* 30 2* 29 1* 30 4*  --  31 6  --  30 1* 31 1* 30 4* 31 4   RDW % 20 9* 20 4* 20 2* 19 5*  --  18 4*  --  17 2* 17 6* 16 7* 16 1*   MPV fL 10 0 9 9 9 8 10 0  --  9 7  --  9 6 9 5 9 7 9 7   NRBC AUTO /100 WBCs  --  0 0 0  --  0  --   --  0 0 0         CMP:  Results from last 7 days   Lab Units 01/17/21  0403 01/16/21  1127 01/16/21  0358 01/15/21  0324 01/14/21  1628 01/14/21  0558 01/13/21  2340 01/13/21  1121 01/13/21  0511 01/12/21  0352   POTASSIUM mmol/L 5 0 5 3 5 6* 4 7 4 6 4 9 4 9 4 8 4 4 4 5   CHLORIDE mmol/L 101 102 104 105 103 102 102 101 99* 97*   CO2 mmol/L 32 30 29 26 26 24 25 26 27 25   BUN mg/dL 19 19 19 18 19 21 22 20 23 18   CREATININE mg/dL 0 57* 0 58* 0 74 0 65 0 66 0 61 0  71 0 77 0 72 0 75   CALCIUM mg/dL 8 2* 8 1* 7 9* 7 9* 7 8* 7 8* 7 7* 8 0* 7 6* 8 3   AST U/L  --   --  34 36  --  67*  --  85* 103* 174*   ALT U/L  --   --  77 94*  --  99*  --  134* 124* 162*   ALK PHOS U/L  --   --  127* 116  --  115  --  137* 132* 133*   EGFR ml/min/1 73sq m 106 105 95 100 99 103 96 93 96 94         BMP:  Results from last 7 days   Lab Units 21  0403 21  1127 21  0358 01/15/21  0324 21  1628 21  0558 21  2340   POTASSIUM mmol/L 5 0 5 3 5 6* 4 7 4 6 4 9 4 9   CHLORIDE mmol/L 101 102 104 105 103 102 102   CO2 mmol/L 32 30 29 26 26 24 25   BUN mg/dL 19 19 19 18 19 21 22   CREATININE mg/dL 0 57* 0 58* 0 74 0 65 0 66 0 61 0 71   CALCIUM mg/dL 8 2* 8 1* 7 9* 7 9* 7 8* 7 8* 7 7*       BNP: No results for input(s): BNP in the last 72 hours  Magnesium:   Results from last 7 days   Lab Units 21  0403 21  0358 01/15/21  0324 21  0558 21  0511   MAGNESIUM mg/dL 2 1 2 2 2 5 2 5 2 0       Coags:   Results from last 7 days   Lab Units 21  2340   INR  1 61*       TSH:        Hemoglobin A1C   Results from last 7 days   Lab Units 21  0358   HEMOGLOBIN A1C % 5 6       Lipid Profile:   Results from last 7 days   Lab Units 21  0358   TRIGLYCERIDES mg/dL 117       Cardiac testing:   Results for orders placed during the hospital encounter of 01/10/21   Echo complete with contrast if indicated    [facility] [address] [facility]  [phone]    Transthoracic Echocardiogram  2D, M-mode, Doppler, and Color Doppler    Study date:  15-Som-2021    Patient: Vijaya Melissa  MR number: [id number]  Account number: [de-identified]  : 1950  Age: 78 years  Gender: Male  Status: Inpatient  Location: ICU  Height: 66 in  Weight: 195 lb  BP: 149/ 71 mmHg    Indications: New V-Tach, COVID-19      Diagnoses: I47 2 - Ventricular tachycardia    Sonographer:  HARJINDER Moreno  Primary Physician:  Anjali Wesley Jamil Bee MD  Referring Physician:  Nicolle Morrison MD  Group:  [facility]  Interpreting Physician:  Peggy Esquivel DO    SUMMARY    SUMMARY:  Limited study  LEFT VENTRICLE:  Systolic function was normal  Ejection fraction was estimated to be 60 %  Although no diagnostic regional wall motion abnormality was identified, this possibility cannot be completely excluded on the basis of this study  RIGHT VENTRICLE:  The ventricle was mildly to moderately dilated  Systolic function was mildly reduced  Free wall hypokinesis  LEFT ATRIUM:  The atrium was dilated  MITRAL VALVE:  There was mild regurgitation  AORTIC VALVE:  There was mild to moderate regurgitation  TRICUSPID VALVE:  There was moderate regurgitation  Estimated peak PA pressure was 75 mmHg  IVC, HEPATIC VEINS:  The inferior vena cava was dilated, with poor inspiratory collapse, consistent with elevated right atrial pressure  HISTORY: PRIOR HISTORY: COVID-19 +, hypertension, PHTN, Luekemia, Alcohol Abuse, Former Smoker  PROCEDURE: The study was performed in the ICU  This was a routine study  The transthoracic approach was used  The study included complete 2D imaging, M-mode, complete spectral Doppler, and color Doppler  The heart rate was 47 bpm, at the  start of the study  Images were obtained from the parasternal, apical, subcostal, and suprasternal notch acoustic windows  Image quality was adequate  LEFT VENTRICLE: Size was normal  Systolic function was normal  Ejection fraction was estimated to be 60 %  Although no diagnostic regional wall motion abnormality was identified, this possibility cannot be completely excluded on the basis  of this study  RIGHT VENTRICLE: The ventricle was mildly to moderately dilated  Systolic function was mildly reduced  Free wall hypokinesis  Wall thickness was normal     LEFT ATRIUM: The atrium was dilated      RIGHT ATRIUM: Size was normal     MITRAL VALVE: Valve structure was normal  There was normal leaflet separation  DOPPLER: The transmitral velocity was within the normal range  There was no evidence for stenosis  There was mild regurgitation  AORTIC VALVE: The valve was trileaflet  Leaflets exhibited normal thickness and normal cuspal separation  DOPPLER: Transaortic velocity was within the normal range  There was no evidence for stenosis  There was mild to moderate  regurgitation  TRICUSPID VALVE: The valve structure was normal  There was normal leaflet separation  DOPPLER: The transtricuspid velocity was within the normal range  There was no evidence for stenosis  There was moderate regurgitation  Estimated peak PA  pressure was 75 mmHg  PULMONIC VALVE: Not well visualized  PERICARDIUM: There was no pericardial effusion  The pericardium was normal in appearance  AORTA: The root exhibited normal size  SYSTEMIC VEINS: IVC: The inferior vena cava was dilated, with poor inspiratory collapse, consistent with elevated right atrial pressure  SYSTEM MEASUREMENT TABLES    2D  %FS: 33 33 %  Ao Diam: 3 62 cm  EDV(Teich): 99 88 ml  EF(Teich): 62 02 %  ESV(Teich): 37 93 ml  IVSd: 1 15 cm  LA Area: 26 46 cm2  LA Diam: 4 59 cm  LVEDV MOD A4C: 166 8 ml  LVEF MOD A4C: 60 98 %  LVESV MOD A4C: 65 09 ml  LVIDd: 4 65 cm  LVIDs: 3 1 cm  LVLd A4C: 9 42 cm  LVLs A4C: 7 89 cm  LVPWd: 1 03 cm  RA Area: 16 54 cm2  RVIDd: 5 01 cm  SV MOD A4C: 101 71 ml  SV(Teich): 61 94 ml    CW  AR Dec Donley: 3 09 m/s2  AR Dec Time: 1445 59 ms  AR PHT: 419 22 ms  AR Vmax: 4 47 m/s  AR maxP 75 mmHg  AV Env  Ti: 304 47 ms  AV MaxP 7 mmHg  AV VTI: 33 72 cm  AV Vmax: 1 78 m/s  AV Vmean: 1 11 m/s  AV meanP 58 mmHg  MR VTI: 204 77 cm  MR Vmax: 4 56 m/s  MR Vmean: 3 84 m/s  MR maxP 27 mmHg  MR meanP 52 mmHg  TR MaxP 73 mmHg  TR Vmax: 3 96 m/s    PW  LVOT Env  Ti: 374 88 ms  LVOT VTI: 24 53 cm  LVOT Vmax: 1 04 m/s  LVOT Vmean: 0 65 m/s  LVOT maxP 42 mmHg  LVOT meanPG: 2 05 mmHg  MV A Adithya: 0 16 m/s  MV Dec Vigo: 2 45 m/s2  MV DecT: 254 72 ms  MV E Adithya: 0 62 m/s  MV E/A Ratio: 3 99  MV PHT: 73 87 ms  MVA By PHT: 2 98 cm2    [address] [facility]    Prepared and electronically signed by    Chris Victoria DO  Signed 15-Som-2021 12:34:36       No results found for this or any previous visit  No results found for this or any previous visit  No results found for this or any previous visit  Meds/Allergies   all current active meds have been reviewed  Medications Prior to Admission   Medication    tamsulosin (FLOMAX) 0 4 mg    zolpidem (AMBIEN) 10 mg tablet    amLODIPine (NORVASC) 5 mg tablet    aspirin 81 MG tablet    atorvastatin (LIPITOR) 20 mg tablet    carvedilol (COREG) 12 5 mg tablet    dextromethorphan-guaiFENesin (ROBITUSSIN DM)  mg/5 mL syrup    fluticasone (FLONASE) 50 mcg/act nasal spray    losartan (COZAAR) 100 MG tablet    Milk Thistle 1000 MG CAPS    minoxidil (LONITEN) 10 mg tablet    montelukast (SINGULAIR) 10 mg tablet    naproxen (EC NAPROSYN) 500 MG EC tablet    Omega-3 Fatty Acids (FISH OIL) 1,000 mg    omeprazole (PriLOSEC) 40 MG capsule    sertraline (ZOLOFT) 50 mg tablet          Assessment:  Principal Problem:    COVID-19  Active Problems:    Essential hypertension    CLL (chronic lymphocytic leukemia) (HCC)    Pulmonary hypertension (HCC)    Hypercholesterolemia    Mild depression (HCC)    Diverticulitis    BPH with obstruction/lower urinary tract symptoms    Alcohol abuse    Hyperglycemia    Acute respiratory failure with hypoxia (HCC)    QT prolongation    Sinus los-tachy syndrome (HCC)    Anemia    Acute cystitis without hematuria    Transaminitis            Counseling / Coordination of Care  Total floor / unit time spent today 25 minutes  Greater than 50% of total time was spent with the patient and / or family counseling and / or coordination of care    A description of the counseling / coordination of care: Pam Fowler

## 2021-01-18 NOTE — PROGRESS NOTES
Daily Progress Note - 102 [facility] Dina Willams 78 y o  male MRN: [id number]  Unit/Bed#: ICU 08 Encounter: [id number]        ----------------------------------------------------------------------------------------  HPI/24hr events: Kevin Bond is a 78 y o  male who presents with acute hypoxic respiratory failure  The patient has a past medical history significant for urinary retention, CLL who was transferred from [facility] with hypoxia  The patient reports he was in his usual state of health until Saturday January 2nd when he and his wife both developed symptoms of COVID-19  The patient went for COVID-19 testing on Monday January 4th which was positive  He had persistent symptoms and was scheduled to receive Bamlnivumab outpatient infusion  Immediately prior to infusion, the patient was undergoing assessment by the nurse who noted he was hypoxic and hypotensive  The infusion was not administered  Patient was transferred immediately to the ER  In ER, he required high-flow nasal cannula to maintain adequate saturations  The patient received fluid boluses for his hypotension in the ED  Patient extubated on 01/17 has been closely monitored for any signs of respiratory distress or need for re-intubation  Has been on high flow nasal cannula 60% 30 L     ---------------------------------------------------------------------------------------  SUBJECTIVE  Patient is doing well today and his only concern is that he is hungry  Not feeling as short of breath denies chest pain  Review of Systems   Constitutional: Negative for chills and fever  HENT: Negative for congestion and sore throat  Eyes: Negative for visual disturbance  Respiratory: Positive for shortness of breath  Negative for cough and chest tightness  Cardiovascular: Negative for chest pain and palpitations  Gastrointestinal: Negative for abdominal distention, abdominal pain, constipation, diarrhea and vomiting     Skin: Negative for rash  Neurological: Negative for headaches  Psychiatric/Behavioral: Negative for behavioral problems and confusion  Review of systems was reviewed and negative unless stated above in HPI/24-hour events   ---------------------------------------------------------------------------------------  Assessment and Plan:    Neuro:    Diagnosis:  Alcohol abuse  o Plan:  No withdrawal noted at this time  o Thiamine and folate  o Restraints as needed   Diagnosis:  Depression  o Plan:  Continue Zoloft  o Maintain sleep-wake cycle with melatonin 6 mg q h s   o Fentanyl drip 100 micrograms/hour weaned      CV:    Diagnosis:  QT prolongation 697  o Plan:  Avoid QT prolonging medications  o Has been off propofol for more than 1 day continue to hold as extubated  o Daily EKGs   Diagnosis:  Sinus bradycardia  o Plan:  Cardiology agrees that this is likely due to propofol infusion, this has been discontinued for greater than 24 hours no bradycardia events noted  o If patient develops high degree AV block or symptomatic bradycardia cardiology suggests transvenous pacer   Diagnosis:  Nonsustained V-tach  o Plan:  Multiple ectopy rounds on the evening of 1/16 with 1 that was 5 beats  o Will observe  o No treatment as per Cardiology as this may worsen bradycardia   Diagnosis:  Hypertension with elevated blood pressures  o Plan:  Hydralazine p r n   o Hold losartan as patient hyperkalemia yesterday  o Resume low-dose Coreg as no bradycardia for 24 hours if he becomes bradycardic may need pacer  o Continue amlodipine 5 mg q d     Diagnosis:  Hyperlipidemia  o Plan:  Continue atorvastatin 40mg        Pulm:   Diagnosis:  Acute hypoxic respiratory failure due to COVID-19 pneumonia 5 days on vent currently extubated, day 9/10 of COVID protocol  o Plan:  Currently on HFNC 30L at 60%  o Atorvastatin 40 mg q d   o Vitamin-D/vitamin-C/zinc  o Famotidine 20 mg   o remdesivir completed 1/14  o Actemra 1/13  o Status post convalescent plasma 1/12   Diagnosis: Pulmonary hypertension  o Plan: RA pressure of 75  o Will require outpatient f/u      GI:    Diagnosis: Diverticulitis  o Plan: Noted on CT that she has uncomplicated sigmoid diverticulitis   Diagnosis: Transaminitis with elevated bili  o Plan: RUQ/ US shows GB polyp  o No gallstones  o No need for treatment will continue to monitor    :    Diagnosis: Acute cystitis without hematuria 4 days of cefepime pompleted  o Plan:  UA showed large leukocytes/innumerable wbc's/occasional bacteremia which did not reflex   o Four days of cefepime completed   Diagnosis:  BPH with obstruction  o Plan:  Flomax was held in as they are unable to flush it, patient can resume when returned to diet      F/E/N:    Plan:  Monitor electrolytes and replete as indicated   Ice chips and and clears overnight   Plan to restart diet today      Heme/Onc:    Diagnosis:  Anemia  o Plan:  Baseline hemoglobin 12  o Status post 2 units of RBCs, hemoglobin 9 9 and stable post transfusion  o No source of bleeding noted  o On DVT prophylaxis therapeutc lovenox   Diagnosis: CLL  o Plan:  Wbc's increased from 142>962>556 for today  o Potentially hemoconcentrated  And secondary to CLL  o Hemeonc following, recommendations appreciated  o Will continue to monitor      Endo:    Diagnosis:  Hyperglycemia secondary to steroids  o Plan:  Continue SSI algorithm 1      ID:    Diagnosis:  COVID-19   o Plan:  See above   Diagnosis:  Acute cystitis without hematuria  Plan:  See above    MSK/Skin:   o Diagnosis:  No active issues      Disposition: Continue Critical Care   Code Status: Level 1 - Full Code  ---------------------------------------------------------------------------------------  ICU CORE MEASURES    Prophylaxis   VTE Pharmacologic Prophylaxis: Enoxaparin (Lovenox)  VTE Mechanical Prophylaxis: sequential compression device  Stress Ulcer Prophylaxis: Famotidine PO    ABCDE Protocol (if indicated)  Plan to perform spontaneous awakening trial today? N/A  Plan to perform spontaneous breathing trial today? N/A  Obvious barriers to extubation? N/A  CAM-ICU: negative    Invasive Devices Review  Invasive Devices     Peripheral Intravenous Line            Long-Dwell Peripheral IV (Midline) / Left Cephalic 4 days    Peripheral IV 21 Right Forearm 1 day          Drain            Urethral Catheter Latex 16 Fr  4 days              Can any invasive devices be discontinued today? N/A  ---------------------------------------------------------------------------------------  OBJECTIVE    Vitals   Vitals:    21 0024 21 0100 21 0300 21 0303   BP: 157/79 148/77 147/77    BP Location: Right arm      Pulse: 67 65 59    Resp: 18 15 15    Temp: 98 6 °F (37 °C)      TempSrc: Oral      SpO2: (!) 88% (!) 88% 90% 90%   Weight:       Height:         Temp (24hrs), Av 6 °F (37 °C), Min:98 2 °F (36 8 °C), Max:98 9 °F (37 2 °C)  Current: Temperature: 98 6 °F (37 °C)      Respiratory:  SpO2: SpO2: 92 %  Nasal Cannula O2 Flow Rate (L/min): 10 L/min    Invasive/non-invasive ventilation settings   Respiratory    Lab Data (Last 4 hours)    None         O2/Vent Data (Last 4 hours)      303          Non-Invasive Ventilation Mode HFNC (High flow)                   Physical Exam  Constitutional:       General: He is not in acute distress  Appearance: Normal appearance  He is not toxic-appearing  HENT:      Head: Normocephalic and atraumatic  Right Ear: External ear normal       Left Ear: External ear normal       Nose: Nose normal  No congestion  Mouth/Throat:      Mouth: Mucous membranes are moist       Pharynx: Oropharynx is clear  Cardiovascular:      Rate and Rhythm: Normal rate and regular rhythm  Heart sounds: No murmur  No friction rub  No gallop  Pulmonary:      Effort: Pulmonary effort is normal       Breath sounds: Rhonchi present  No wheezing     Abdominal:      Palpations: Abdomen is soft  Tenderness: There is no abdominal tenderness  There is no guarding or rebound  Musculoskeletal:      Right lower leg: No edema  Left lower leg: No edema  Skin:     General: Skin is warm and dry  Capillary Refill: Capillary refill takes less than 2 seconds  Neurological:      General: No focal deficit present  Mental Status: He is alert and oriented to person, place, and time     Psychiatric:         Mood and Affect: Mood normal          Behavior: Behavior normal          Laboratory and Diagnostics:  Results from last 7 days   Lab Units 01/17/21  0403 01/16/21  0358 01/15/21  0324 01/14/21  1628 01/14/21  0558 01/13/21  2341 01/13/21  1151 01/13/21  0757 01/12/21  1609 01/12/21  0352   WBC Thousand/uL 158 52* 125 45* 86 46*  --  74 16*  --  70 15* 84 73* 110 11* 105 45*   HEMOGLOBIN g/dL 9 8* 8 8* 7 2* 7 2* 7 2* 6 7* 5 8* 6 8* 7 8* 7 5*   HEMATOCRIT % 32 5* 30 2* 23 7* 23 6* 22 8* 21 5* 19 3* 21 9* 25 7* 23 9*   PLATELETS Thousands/uL 436* 384 276  --  266  --  293 318 359 327   NEUTROS PCT %  --  7*  --   --  9*  --   --   --   --  11*   MONOS PCT %  --  0*  --   --  1*  --   --   --   --  0*   MONO PCT % 0*  --  0*  --   --   --   --  1* 1*  --      Results from last 7 days   Lab Units 01/17/21  0403 01/16/21  1127 01/16/21  0358 01/15/21  0324 01/14/21  1628 01/14/21  0558 01/13/21  2340 01/13/21  1121 01/13/21  0511 01/12/21  0352   SODIUM mmol/L 139 138 137 136 135* 134* 133* 132* 133* 129*   POTASSIUM mmol/L 5 0 5 3 5 6* 4 7 4 6 4 9 4 9 4 8 4 4 4 5   CHLORIDE mmol/L 101 102 104 105 103 102 102 101 99* 97*   CO2 mmol/L 32 30 29 26 26 24 25 26 27 25   ANION GAP mmol/L 6 6 4 5 6 8 6 5 7 7   BUN mg/dL 19 19 19 18 19 21 22 20 23 18   CREATININE mg/dL 0 57* 0 58* 0 74 0 65 0 66 0 61 0 71 0 77 0 72 0 75   CALCIUM mg/dL 8 2* 8 1* 7 9* 7 9* 7 8* 7 8* 7 7* 8 0* 7 6* 8 3   GLUCOSE RANDOM mg/dL 160* 124 138 159* 190* 206* 159* 120 118 150*   ALT U/L  --   --  77 94*  --  99*  -- 134* 124* 162*   AST U/L  --   --  34 36  --  67*  --  85* 103* 174*   ALK PHOS U/L  --   --  127* 116  --  115  --  137* 132* 133*   ALBUMIN g/dL  --   --  2 5* 2 1*  --  2 0*  --  2 3* 2 2* 2 5*   TOTAL BILIRUBIN mg/dL  --   --  1 61* 1 75*  --  2 48*  --  2 85* 2 27* 2 22*     Results from last 7 days   Lab Units 01/17/21  0403 01/16/21  0358 01/15/21  0324 01/14/21  0558 01/13/21  0511   MAGNESIUM mg/dL 2 1 2 2 2 5 2 5 2 0   PHOSPHORUS mg/dL  --   --  3 5  --   --       Results from last 7 days   Lab Units 01/13/21  2340   INR  1 61*      Results from last 7 days   Lab Units 01/14/21  0559 01/14/21  0254 01/13/21  2340   TROPONIN I ng/mL 0 22* 0 28* 0 34*         ABG:  Results from last 7 days   Lab Units 01/15/21  2221   PH ART  7 373   PCO2 ART mm Hg 45 3*   PO2 ART mm Hg 75 3   HCO3 ART mmol/L 25 8   BASE EXC ART mmol/L 0 4   ABG SOURCE  Radial, Left     VBG:  Results from last 7 days   Lab Units 01/15/21  2221   ABG SOURCE  Radial, Left     Results from last 7 days   Lab Units 01/14/21  0558 01/13/21  0757   PROCALCITONIN ng/ml 0 06 0 12       Micro  Results from last 7 days   Lab Units 01/15/21  1034 01/13/21  0758   SPUTUM CULTURE   --  No growth   GRAM STAIN RESULT   --  No Epithelial cells per low power field  No Polys or Bacteria seen   MRSA CULTURE ONLY  No Methicillin Resistant Staphlyococcus aureus (MRSA) isolated  --        EKG: no new EKG, last 1/15  Imaging:  I have personally reviewed pertinent reports        Intake and Output  I/O       01/16 0701 - 01/17 0700 01/17 0701 - 01/18 0700    I V  (mL/kg) 320 2 (3 6) 41 (0 5)    NG/ 120    IV Piggyback 300     Feedings 700 160    Total Intake(mL/kg) 1470 2 (16 6) 321 (3 6)    Urine (mL/kg/hr) 4290 (2) 1990 (0 9)    Stool 0 0    Total Output 4290 1990    Net -2819 8 -1669          Unmeasured Stool Occurrence 1 x 1 x            Height and Weights   Height: 5' 6" (167 6 cm)(most recent ht in chart from 11/10/20 encounter)  IBW: 63 8 kg  Body mass index is 31 6 kg/m²  Weight (last 2 days)     Date/Time   Weight    01/17/21 0600   88 8 (195 77)    01/16/21 0600   88 8 (195 77)                Nutrition       Diet Orders   (From admission, onward)             Start     Ordered    01/17/21 0940  Diet NPO  Diet effective now     Question Answer Comment   Diet Type NPO    RD to adjust diet per protocol?  Yes        01/17/21 0940    01/10/21 1624  Room Service  Once     Question:  Type of Service  Answer:  Room Service - Appropriate with Assistance    01/10/21 1623                    Active Medications  Scheduled Meds:  Current Facility-Administered Medications   Medication Dose Route Frequency Provider Last Rate    albuterol  2 puff Inhalation Q4H PRN MATHEW Samayoa      amLODIPine  5 mg Oral QPM MATHEW Worthington      aspirin  81 mg Oral Daily MATHEW Samayoa      atorvastatin  40 mg Per NG Tube HS Sarai Bettencourt PA-C      bisacodyl  10 mg Rectal Daily PRN MATHEW Matias      carvedilol  6 25 mg Oral BID With Meals Shyla Zepeda MD      chlorhexidine  15 mL Swish & Spit Q12H [facility] [address]      cholecalciferol  2,000 Units Per NG Tube Daily Sarai Bettencourt PA-C      dexamethasone  0 1 mg/kg Intravenous Q12H MATHEW Samayoa      dextromethorphan-guaiFENesin  10 mL Oral Q4H PRN Farnaz Moreno PA-C      enoxaparin  1 mg/kg Subcutaneous Q12H [facility] Ashleigh Alberto DO      famotidine  20 mg Per NG Tube BID Collette Calamity, CRNP      fentanyl citrate (PF)  50 mcg Intravenous Q1H PRN Collette Calamity, CRNP      fluticasone  2 spray Nasal Daily PRN MATHEW Samayoa      folic acid  1 mg Oral Daily Shyla Zepeda MD      glycerin-hypromellose-  1 drop Both Eyes Q3H PRN Cherelle Cunningham MD      hydrALAZINE  20 mg Intravenous Q6H PRN Cristel Matthews PA-C      insulin lispro  1-5 Units Subcutaneous Q6H Steve Sesay MD      LORazepam  0 5 mg Oral Q8H PRN Lauri Graham PA-C      melatonin  6 mg Oral HS Rebbeca Smoker, CRNP      montelukast  10 mg Oral Daily Rebbeca Smoker, CRNP      multivitamin-minerals  1 tablet Oral Daily Rebbeca Smoker, CRNP      polyethylene glycol  17 g Oral Daily Alisha Prude, [place]      senna-docusate sodium  1 tablet Oral HS Alisha PrALISHA antunez      sertraline  50 mg Oral Daily Jules Bowen MD      thiamine  100 mg Per NG Tube Daily Bartolo Monahan PA-C       Continuous Infusions:     PRN Meds:   albuterol, 2 puff, Q4H PRN  bisacodyl, 10 mg, Daily PRN  dextromethorphan-guaiFENesin, 10 mL, Q4H PRN  fentanyl citrate (PF), 50 mcg, Q1H PRN  fluticasone, 2 spray, Daily PRN  glycerin-hypromellose-, 1 drop, Q3H PRN  hydrALAZINE, 20 mg, Q6H PRN  LORazepam, 0 5 mg, Q8H PRN        Allergies   Allergies   Allergen Reactions    Codeine Other (See Comments)     agitated    Sulfamethoxazole-Trimethoprim GI Intolerance and Abdominal Pain     upset stomach     ---------------------------------------------------------------------------------------  Advance Directive and Living Will: Yes    Power of :    POLST:    ---------------------------------------------------------------------------------------  Care Time Delivered:       Aaron Apt, DO      Portions of the record may have been created with voice recognition software  Occasional wrong word or "sound a like" substitutions may have occurred due to the inherent limitations of voice recognition software    Read the chart carefully and recognize, using context, where substitutions have occurred

## 2021-01-18 NOTE — TELEPHONE ENCOUNTER
Patient called into the office just to inform Dr Ortega that he was in the hospital due to covid, patient stated he was intubated due to his oxygen levels dropping  Patient stated he would love to hear back from Dr Celine Anna and he wanted him to know he is doing better

## 2021-01-18 NOTE — QUICK NOTE
Alex Jace patient's son called with an update with current High flow requirements and back on a diet  Overall prognosis has greatly improved  All questions are answered and concerns addressed

## 2021-01-19 ENCOUNTER — TELEPHONE (OUTPATIENT)
Dept: FAMILY MEDICINE CLINIC | Facility: CLINIC | Age: 71
End: 2021-01-19

## 2021-01-19 ENCOUNTER — TELEPHONE (OUTPATIENT)
Dept: HEMATOLOGY ONCOLOGY | Facility: CLINIC | Age: 71
End: 2021-01-19

## 2021-01-19 PROBLEM — I47.29 NSVT (NONSUSTAINED VENTRICULAR TACHYCARDIA): Status: ACTIVE | Noted: 2021-01-19

## 2021-01-19 PROBLEM — I47.2 NSVT (NONSUSTAINED VENTRICULAR TACHYCARDIA) (HCC): Status: ACTIVE | Noted: 2021-01-19

## 2021-01-19 LAB
ANION GAP SERPL CALCULATED.3IONS-SCNC: 5 MMOL/L (ref 4–13)
ANISOCYTOSIS BLD QL SMEAR: PRESENT
ATRIAL RATE: 53 BPM
ATRIAL RATE: 60 BPM
ATRIAL RATE: 68 BPM
ATRIAL RATE: 71 BPM
ATRIAL RATE: 91 BPM
BASOPHILS # BLD MANUAL: 0 THOUSAND/UL (ref 0–0.1)
BASOPHILS NFR MAR MANUAL: 0 % (ref 0–1)
BUN SERPL-MCNC: 23 MG/DL (ref 5–25)
CALCIUM SERPL-MCNC: 8.3 MG/DL (ref 8.3–10.1)
CHLORIDE SERPL-SCNC: 97 MMOL/L (ref 100–108)
CO2 SERPL-SCNC: 31 MMOL/L (ref 21–32)
CREAT SERPL-MCNC: 0.71 MG/DL (ref 0.6–1.3)
EOSINOPHIL # BLD MANUAL: 0 THOUSAND/UL (ref 0–0.4)
EOSINOPHIL NFR BLD MANUAL: 0 % (ref 0–6)
ERYTHROCYTE [DISTWIDTH] IN BLOOD BY AUTOMATED COUNT: 20.9 % (ref 11.6–15.1)
GFR SERPL CREATININE-BSD FRML MDRD: 96 ML/MIN/1.73SQ M
GLUCOSE SERPL-MCNC: 116 MG/DL (ref 65–140)
GLUCOSE SERPL-MCNC: 129 MG/DL (ref 65–140)
GLUCOSE SERPL-MCNC: 145 MG/DL (ref 65–140)
GLUCOSE SERPL-MCNC: 95 MG/DL (ref 65–140)
HCT VFR BLD AUTO: 33.4 % (ref 36.5–49.3)
HGB BLD-MCNC: 9.8 G/DL (ref 12–17)
LYMPHOCYTES # BLD AUTO: 161.28 THOUSAND/UL (ref 0.6–4.47)
LYMPHOCYTES # BLD AUTO: 97 % (ref 14–44)
MAGNESIUM SERPL-MCNC: 2.3 MG/DL (ref 1.6–2.6)
MCH RBC QN AUTO: 29.9 PG (ref 26.8–34.3)
MCHC RBC AUTO-ENTMCNC: 29.3 G/DL (ref 31.4–37.4)
MCV RBC AUTO: 102 FL (ref 82–98)
MONOCYTES # BLD AUTO: 0 THOUSAND/UL (ref 0–1.22)
MONOCYTES NFR BLD: 0 % (ref 4–12)
NEUTROPHILS # BLD MANUAL: 4.99 THOUSAND/UL (ref 1.85–7.62)
NEUTS SEG NFR BLD AUTO: 3 % (ref 43–75)
NRBC BLD AUTO-RTO: 0 /100 WBCS
P AXIS: 31 DEGREES
P AXIS: 41 DEGREES
P AXIS: 44 DEGREES
P AXIS: 83 DEGREES
PLATELET # BLD AUTO: 319 THOUSANDS/UL (ref 149–390)
PLATELET BLD QL SMEAR: ADEQUATE
PMV BLD AUTO: 9.7 FL (ref 8.9–12.7)
POTASSIUM SERPL-SCNC: 5.2 MMOL/L (ref 3.5–5.3)
PR INTERVAL: 128 MS
PR INTERVAL: 134 MS
PR INTERVAL: 138 MS
PR INTERVAL: 152 MS
QRS AXIS: 61 DEGREES
QRS AXIS: 65 DEGREES
QRS AXIS: 75 DEGREES
QRS AXIS: 78 DEGREES
QRS AXIS: 79 DEGREES
QRSD INTERVAL: 86 MS
QRSD INTERVAL: 88 MS
QRSD INTERVAL: 90 MS
QRSD INTERVAL: 92 MS
QRSD INTERVAL: 94 MS
QT INTERVAL: 478 MS
QT INTERVAL: 590 MS
QT INTERVAL: 602 MS
QT INTERVAL: 614 MS
QT INTERVAL: 614 MS
QTC INTERVAL: 564 MS
QTC INTERVAL: 587 MS
QTC INTERVAL: 590 MS
QTC INTERVAL: 652 MS
QTC INTERVAL: 667 MS
RBC # BLD AUTO: 3.28 MILLION/UL (ref 3.88–5.62)
SODIUM SERPL-SCNC: 133 MMOL/L (ref 136–145)
T WAVE AXIS: 100 DEGREES
T WAVE AXIS: 66 DEGREES
T WAVE AXIS: 79 DEGREES
T WAVE AXIS: 84 DEGREES
T WAVE AXIS: 97 DEGREES
TOTAL CELLS COUNTED SPEC: 100
VENTRICULAR RATE: 53 BPM
VENTRICULAR RATE: 60 BPM
VENTRICULAR RATE: 68 BPM
VENTRICULAR RATE: 71 BPM
VENTRICULAR RATE: 91 BPM
WBC # BLD AUTO: 166.27 THOUSAND/UL (ref 4.31–10.16)

## 2021-01-19 PROCEDURE — 85027 COMPLETE CBC AUTOMATED: CPT | Performed by: INTERNAL MEDICINE

## 2021-01-19 PROCEDURE — 83735 ASSAY OF MAGNESIUM: CPT | Performed by: INTERNAL MEDICINE

## 2021-01-19 PROCEDURE — 93005 ELECTROCARDIOGRAM TRACING: CPT

## 2021-01-19 PROCEDURE — 94003 VENT MGMT INPAT SUBQ DAY: CPT

## 2021-01-19 PROCEDURE — 94760 N-INVAS EAR/PLS OXIMETRY 1: CPT

## 2021-01-19 PROCEDURE — 80048 BASIC METABOLIC PNL TOTAL CA: CPT | Performed by: INTERNAL MEDICINE

## 2021-01-19 PROCEDURE — 99232 SBSQ HOSP IP/OBS MODERATE 35: CPT | Performed by: INTERNAL MEDICINE

## 2021-01-19 PROCEDURE — 97167 OT EVAL HIGH COMPLEX 60 MIN: CPT

## 2021-01-19 PROCEDURE — 82948 REAGENT STRIP/BLOOD GLUCOSE: CPT

## 2021-01-19 PROCEDURE — 93010 ELECTROCARDIOGRAM REPORT: CPT | Performed by: INTERNAL MEDICINE

## 2021-01-19 PROCEDURE — 97110 THERAPEUTIC EXERCISES: CPT

## 2021-01-19 PROCEDURE — 97164 PT RE-EVAL EST PLAN CARE: CPT

## 2021-01-19 PROCEDURE — 97530 THERAPEUTIC ACTIVITIES: CPT

## 2021-01-19 PROCEDURE — NC001 PR NO CHARGE: Performed by: INTERNAL MEDICINE

## 2021-01-19 PROCEDURE — 85007 BL SMEAR W/DIFF WBC COUNT: CPT | Performed by: INTERNAL MEDICINE

## 2021-01-19 RX ORDER — PANTOPRAZOLE SODIUM 40 MG/1
40 TABLET, DELAYED RELEASE ORAL
Status: DISCONTINUED | OUTPATIENT
Start: 2021-01-19 | End: 2021-01-21 | Stop reason: HOSPADM

## 2021-01-19 RX ORDER — TAMSULOSIN HYDROCHLORIDE 0.4 MG/1
0.4 CAPSULE ORAL
Status: DISCONTINUED | OUTPATIENT
Start: 2021-01-19 | End: 2021-01-21 | Stop reason: HOSPADM

## 2021-01-19 RX ORDER — PREDNISONE 20 MG/1
20 TABLET ORAL DAILY
Status: DISCONTINUED | OUTPATIENT
Start: 2021-01-26 | End: 2021-01-21 | Stop reason: HOSPADM

## 2021-01-19 RX ORDER — AMLODIPINE BESYLATE 5 MG/1
5 TABLET ORAL 2 TIMES DAILY
Status: DISCONTINUED | OUTPATIENT
Start: 2021-01-19 | End: 2021-01-21 | Stop reason: HOSPADM

## 2021-01-19 RX ORDER — PREDNISONE 20 MG/1
40 TABLET ORAL DAILY
Status: DISCONTINUED | OUTPATIENT
Start: 2021-01-20 | End: 2021-01-21 | Stop reason: HOSPADM

## 2021-01-19 RX ORDER — PREDNISONE 10 MG/1
10 TABLET ORAL DAILY
Status: DISCONTINUED | OUTPATIENT
Start: 2021-01-29 | End: 2021-01-21 | Stop reason: HOSPADM

## 2021-01-19 RX ORDER — CALCIUM CARBONATE 200(500)MG
500 TABLET,CHEWABLE ORAL DAILY PRN
Status: DISCONTINUED | OUTPATIENT
Start: 2021-01-19 | End: 2021-01-21 | Stop reason: HOSPADM

## 2021-01-19 RX ADMIN — TAMSULOSIN HYDROCHLORIDE 0.4 MG: 0.4 CAPSULE ORAL at 17:25

## 2021-01-19 RX ADMIN — DEXAMETHASONE SODIUM PHOSPHATE 8.5 MG: 10 INJECTION, SOLUTION INTRAMUSCULAR; INTRAVENOUS at 21:29

## 2021-01-19 RX ADMIN — METOPROLOL TARTRATE 25 MG: 25 TABLET, FILM COATED ORAL at 21:30

## 2021-01-19 RX ADMIN — ENOXAPARIN SODIUM 80 MG: 80 INJECTION SUBCUTANEOUS at 08:24

## 2021-01-19 RX ADMIN — FOLIC ACID 1 MG: 1 TABLET ORAL at 08:23

## 2021-01-19 RX ADMIN — CALCIUM CARBONATE 500 MG: 500 TABLET, CHEWABLE ORAL at 22:28

## 2021-01-19 RX ADMIN — ENOXAPARIN SODIUM 80 MG: 80 INJECTION SUBCUTANEOUS at 21:28

## 2021-01-19 RX ADMIN — ASPIRIN 81 MG: 81 TABLET ORAL at 08:24

## 2021-01-19 RX ADMIN — CHLORHEXIDINE GLUCONATE 0.12% ORAL RINSE 15 ML: 1.2 LIQUID ORAL at 21:29

## 2021-01-19 RX ADMIN — METOPROLOL TARTRATE 25 MG: 25 TABLET, FILM COATED ORAL at 08:24

## 2021-01-19 RX ADMIN — AMLODIPINE BESYLATE 5 MG: 5 TABLET ORAL at 21:30

## 2021-01-19 RX ADMIN — MELATONIN 6 MG: at 22:29

## 2021-01-19 RX ADMIN — THIAMINE HCL TAB 100 MG 100 MG: 100 TAB at 08:24

## 2021-01-19 RX ADMIN — SERTRALINE HYDROCHLORIDE 50 MG: 50 TABLET ORAL at 08:23

## 2021-01-19 RX ADMIN — DOCUSATE SODIUM AND SENNOSIDES 1 TABLET: 8.6; 5 TABLET ORAL at 22:31

## 2021-01-19 RX ADMIN — DEXAMETHASONE SODIUM PHOSPHATE 8.5 MG: 10 INJECTION, SOLUTION INTRAMUSCULAR; INTRAVENOUS at 08:25

## 2021-01-19 RX ADMIN — MONTELUKAST SODIUM 10 MG: 10 TABLET, FILM COATED ORAL at 08:24

## 2021-01-19 RX ADMIN — Medication 2000 UNITS: at 08:23

## 2021-01-19 RX ADMIN — LORAZEPAM 0.5 MG: 0.5 TABLET ORAL at 22:31

## 2021-01-19 RX ADMIN — ATORVASTATIN CALCIUM 40 MG: 40 TABLET, FILM COATED ORAL at 22:30

## 2021-01-19 RX ADMIN — Medication 1 TABLET: at 08:23

## 2021-01-19 NOTE — TELEPHONE ENCOUNTER
Patient is calling in inquiring whether Shine Linder would be able to visit him in the [facility] ICU or if he would be able to call patient at [phone]

## 2021-01-19 NOTE — ASSESSMENT & PLAN NOTE
· Carvedilol 12 5 mg BID switched to metoprolol 25 mg BID in effort to suppress  · Intermittent PVVC and bigeminy overnight

## 2021-01-19 NOTE — ASSESSMENT & PLAN NOTE
· Baseline hemoglobin 12  · Status post 2 units PRBCs between 1/13 and 1/14  · No signs of active bleeding  · No signs of blood in stool  · Hgb has stabilized at 9 8 today  · Check occult/iron B12 and folate - likely anemia of chronic disease  · CBC in the a m

## 2021-01-19 NOTE — TELEPHONE ENCOUNTER
PATIENT CALL HE STILL IN [address] AND  HAVE AN APPOINTMENT WITH YOU ON Friday WOULD LIKE TO KEEP IT, AND SO I CHANGE IT TO A VIRTUAL FACETIME

## 2021-01-19 NOTE — PROGRESS NOTES
Daily Progress Note - [facility] Dina Willams 78 y o  male MRN: [id number]  Unit/Bed#: ICU 08 Encounter: [id number]        ----------------------------------------------------------------------------------------  HPI/24hr events: Coleman Anderson is a 78 y o  male who presents with acute hypoxic respiratory failure  The patient has a past medical history significant for urinary retention, CLL  The patient reports he was in his usual state of health until COVID 19 symtoms started 1/2 and tested positive on 1/4  He had persistent symptoms and did not receive Bamlanivumab due to being hypoxic and hypotensive at the infusion center and was requiring HFNC in the ED and thus transferred to ICU on 1/10  No acute events overnight bradycardic in the 50s with intermittent PVCs and bigeminy     ---------------------------------------------------------------------------------------  SUBJECTIVE  Patient reports that he would like to try to get up and out of bed  Also saying that the pepsid upsets his stomach and he would prefer omeprazole  He is feeling well aside from some slight chest wall discomfort  Review of Systems   Constitutional: Negative for chills, fatigue and fever  HENT: Negative for congestion  Eyes: Negative for visual disturbance  Respiratory: Negative for cough, chest tightness and shortness of breath  Some chest wall discomfort   Cardiovascular: Negative for chest pain and palpitations  Gastrointestinal: Negative for abdominal pain, constipation, diarrhea, nausea and vomiting  Genitourinary: Negative for difficulty urinating  Musculoskeletal: Negative for back pain  Neurological: Negative for dizziness and headaches  Psychiatric/Behavioral: Negative for confusion       Review of systems was reviewed and negative unless stated above in HPI/24-hour events   ---------------------------------------------------------------------------------------  Assessment and Plan:    Neuro:  Diagnosis: Alcohol abuse  o Plan:  Not a daily drinker no withdrawal at this time  o CIWA protocol has discontinued  o Thiamine and folate  o Currently AAOx3   Diagnosis:  Depression  o Plan:  Continue Zoloft  o Maintain sleep-wake cycle with melatonin 6 mg q h s  CV:    Diagnosis:  QT prolongation 697  o Plan:  Avoid QT prolonging medications  o Has resolved since being off of propofol  o Daily EKGs   Diagnosis:  Sinus bradycardia  o Plan:  Has resolved since being propofol  o If patient were to develop high degree AV block or symptomatic bradycardia cardiology would suggest transvenous pacer   Diagnosis:  NSVT  o Plan:  Cardiology following  o carvedilol 12 5 mg b i d  Was switched to metoprolol 25 mg b i d  In an effort to suppress  o Will continue to monitor on telemetry   Diagnosis:  Hypertension with elevated blood pressures  o Plan:  Hydralazine p r n   o Continue amlodipine 5 mg daily  o Holding losartan in the setting of hyperkalemia  o On the metoprolol rather than home carvedilol as above   Diagnosis:  Hyperlipidemia  o Plan:  Continue atorvastatin 40 mg      Pulm:   Diagnosis:  Acute hypoxic respiratory failure due to COVID-19 pneumonia 5 days on vent extubated x2 days, day 9/10 of COVID protocol  o Plan:  Currently on mid flow 8 L  o Atorvastatin 40 mg daily  o Vitamin-D/vitamin-C/zinc  o Famotidine 20 mg transition to protonix  o Remdesivir completed 1/14  o Actemra completed 1/13  o Convalescent plasma 1/12  o Continue decadron today transition to prednisone 40 mg tomorrow taper down by 10mg q3 days     Diagnosis:  Pulmonary hypertension  o Plan:  RA pressure of 75  o Will require outpatient follow-up      GI:    Diagnosis:  Diverticulitis  o Plan:  Noted on CT that he has uncomplicated sigmoid diverticulitis   Diagnosis:  Transaminitis with elevated bili  o Plan:  Right upper quadrant ultrasound negative  o Likely elevated in the setting of COVID will continue to monitor      :  Diagnosis:  Acute cystitis without hematuria 4 days of cefepime completed  o Plan:  UA showed large leukocytes numeral Will white blood cells occasional bacteremia which did not reflex   Diagnosis:  BPH with obstruction  o Plan:  Flomax restarted  o Patient will continue to self cath as he does at home as needed with help of nursing      F/E/N:    Plan:  Monitor electrolytes and replete as indicated   On regular house diet      Heme/Onc:    Diagnosis:  Anemia  o Plan:  Baseline hemoglobin 12  o 1 unit received 1/13, 1 unit received 1/14  o Hemoglobin of 9 9 has been stable post transfusion  o No source of bleeding noted  o On therapeutic dose Lovenox   Diagnosis:  CLL  o Plan:  WBC count continues to increase 165 today  o Heme Onc following and updated  o No additional recommendations per heme Onc at this time      Endo:    Diagnosis:  Hyperglycemia secondary to steroids  o Plan:  Continue SSi algorithm 1      ID:    Diagnosis:  COVID-19  o Plan:  See pulmonary above   Diagnosis:  Acute cystitis without hematuria  o Plan:  See  above      MSK/Skin:    Diagnosis:  No active issues  o       Disposition: Transfer to Med-Surg   Code Status: Level 1 - Full Code  ---------------------------------------------------------------------------------------  ICU CORE MEASURES    Prophylaxis   VTE Pharmacologic Prophylaxis: Enoxaparin (Lovenox)  VTE Mechanical Prophylaxis: sequential compression device  Stress Ulcer Prophylaxis: Famotidine PO      Invasive Devices Review  Invasive Devices     Peripheral Intravenous Line            Long-Dwell Peripheral IV (Midline) 51/08/62 Left Cephalic 5 days    Peripheral IV 01/16/21 Right Forearm 2 days              Can any invasive devices be discontinued today?  N/A  ---------------------------------------------------------------------------------------  OBJECTIVE    Vitals   Vitals:    01/18/21 2344 01/19/21 0000 01/19/21 0100 01/19/21 0344   BP: 156/72 156/72     BP Location: Right arm      Pulse: 62 55 56    Resp:  (!) 23 20    Temp:       TempSrc:       SpO2:  90% 95% 94%   Weight:       Height:         Temp (24hrs), Av 1 °F (36 7 °C), Min:97 5 °F (36 4 °C), Max:98 8 °F (37 1 °C)  Current: Temperature: 97 8 °F (36 6 °C)      Respiratory:  SpO2: SpO2: 93 %  Nasal Cannula O2 Flow Rate (L/min): 8 L/min    Invasive/non-invasive ventilation settings   Respiratory    Lab Data (Last 4 hours)    None         O2/Vent Data (Last 4 hours)       034          Non-Invasive Ventilation Mode MFNC (Mid flow)                   Physical Exam    Laboratory and Diagnostics:  Results from last 7 days   Lab Units 21  0600 21  0403 21  0358 01/15/21  0324 21  1628 21  0558 21  2341 21  1151 21  0757 21  1609   WBC Thousand/uL 164 90* 158 52* 125 45* 86 46*  --  74 16*  --  70 15* 84 73* 110 11*   HEMOGLOBIN g/dL 9 9* 9 8* 8 8* 7 2* 7 2* 7 2* 6 7* 5 8* 6 8* 7 8*   HEMATOCRIT % 33 9* 32 5* 30 2* 23 7* 23 6* 22 8* 21 5* 19 3* 21 9* 25 7*   PLATELETS Thousands/uL 364 436* 384 276  --  266  --  293 318 359   NEUTROS PCT %  --   --  7*  --   --  9*  --   --   --   --    MONOS PCT %  --   --  0*  --   --  1*  --   --   --   --    MONO PCT %  --  0*  --  0*  --   --   --   --  1* 1*     Results from last 7 days   Lab Units 21  0859 21  0403 21  1127 21  0358 01/15/21  0324 21  1628 21  0558  21  1121 21  0511   SODIUM mmol/L 137 139 138 137 136 135* 134*   < > 132* 133*   POTASSIUM mmol/L 5 3 5 0 5 3 5 6* 4 7 4 6 4 9   < > 4 8 4 4   CHLORIDE mmol/L 97* 101 102 104 105 103 102   < > 101 99*   CO2 mmol/L 34* 32 30 29 26 26 24   < > 26 27   ANION GAP mmol/L 6 6 6 4 5 6 8   < > 5 7   BUN mg/dL 26* 19 19 19 18 19 21   < > 20 23   CREATININE mg/dL 0 66 0 57* 0 58* 0 74 0 65 0 66 0 61   < > 0 77 0 72   CALCIUM mg/dL 8 7 8 2* 8 1* 7 9* 7 9* 7 8* 7 8*   < > 8 0* 7 6*   GLUCOSE RANDOM mg/dL 90 160* 124 138 159* 190* 206*   < > 120 118   ALT U/L 83*  --   --  77 94*  --  99*  --  134* 124*   AST U/L 46*  --   --  34 36  --  67*  --  85* 103*   ALK PHOS U/L 133*  --   --  127* 116  --  115  --  137* 132*   ALBUMIN g/dL 3 0*  --   --  2 5* 2 1*  --  2 0*  --  2 3* 2 2*   TOTAL BILIRUBIN mg/dL 2 10*  --   --  1 61* 1 75*  --  2 48*  --  2 85* 2 27*    < > = values in this interval not displayed  Results from last 7 days   Lab Units 21  0403 21  0358 01/15/21  0324 21  0558 21  0511   MAGNESIUM mg/dL 2 1 2 2 2 5 2 5 2 0   PHOSPHORUS mg/dL  --   --  3 5  --   --       Results from last 7 days   Lab Units 21  2340   INR  1 61*      Results from last 7 days   Lab Units 21  0559 21  0254 21  2340   TROPONIN I ng/mL 0 22* 0 28* 0 34*         ABG:  Results from last 7 days   Lab Units 01/15/21  2221   PH ART  7 373   PCO2 ART mm Hg 45 3*   PO2 ART mm Hg 75 3   HCO3 ART mmol/L 25 8   BASE EXC ART mmol/L 0 4   ABG SOURCE  Radial, Left     VBG:  Results from last 7 days   Lab Units 01/15/21  2221   ABG SOURCE  Radial, Left     Results from last 7 days   Lab Units 21  0558 21  0757   PROCALCITONIN ng/ml 0 06 0 12       Micro  Results from last 7 days   Lab Units 01/15/21  1034 21  0758   SPUTUM CULTURE   --  No growth   GRAM STAIN RESULT   --  No Epithelial cells per low power field  No Polys or Bacteria seen   MRSA CULTURE ONLY  No Methicillin Resistant Staphlyococcus aureus (MRSA) isolated  --        EK/15 normal sinus rhythm with frequent and consecutive PVCs, nonspecific T-wave abnormality, prolonged QT  Imaging:  I have personally reviewed pertinent reports  Intake and Output  I/O       701 -  07 0701 -  0700    P  O   600    I V  (mL/kg) 41 (0 5)     NG/     Feedings 160     Total Intake(mL/kg) 321 (3 6) 600 (6 8)    Urine (mL/kg/hr) 2840 (1 3) 850 (0 4)    Stool 0 0    Total Output 2840 850    Net -2515 -250          Unmeasured Stool Occurrence 1 x 1 x            Height and Weights   Height: 5' 6" (167 6 cm)(most recent ht in chart from 11/10/20 encounter)  IBW: 63 8 kg  Body mass index is 31 6 kg/m²  Weight (last 2 days)     Date/Time   Weight    01/18/21 0542   88 8 (195 77)    01/17/21 0600   88 8 (195 77)                Nutrition       Diet Orders   (From admission, onward)             Start     Ordered    01/18/21 1741  Dietary nutrition supplements  Once     Question Answer Comment   Select Supplement: Ensure Enlive-Chocolate    Frequency Once        01/18/21 1741    01/18/21 0808  Diet Regular; Regular House  Diet effective now     Question Answer Comment   Diet Type Regular    Regular Regular House    RD to adjust diet per protocol?  Yes        01/18/21 0807    01/10/21 1624  Room Service  Once     Question:  Type of Service  Answer:  Room Service - Appropriate with Assistance    01/10/21 1623                  Active Medications  Scheduled Meds:  Current Facility-Administered Medications   Medication Dose Route Frequency Provider Last Rate    acetaminophen  650 mg Oral Q6H PRN MATHEW Ceja      albuterol  2 puff Inhalation Q4H PRN MATHEW Rod      amLODIPine  5 mg Oral QPM MATHEW Rucker      aspirin  81 mg Oral Daily MATHEW Rod      atorvastatin  40 mg Per NG Tube HS Lorelei Arreola PA-C      bisacodyl  10 mg Rectal Daily PRN MATHEW Ceja      chlorhexidine  15 mL Swish & Spit Q12H [facility] [address]      cholecalciferol  2,000 Units Per NG Tube Daily Lorelei Arreola PA-C      dexamethasone  0 1 mg/kg Intravenous Q12H MATHEW Rod      dextromethorphan-guaiFENesin  10 mL Oral Q4H PRN Deana Brown PA-C      enoxaparin  1 mg/kg Subcutaneous Q12H [facility] Rhesa Rhyme, DO      famotidine  20 mg Oral BID Rhesa Rhyme, DO      fluticasone  2 spray Nasal Daily PRN MATHEW Rod      folic acid  1 mg Oral Daily Cayden Mallory Laura Espinoza MD      glycerin-hypromellose-  1 drop Both Eyes Q3H PRN Santiago Box MD      hydrALAZINE  20 mg Intravenous Q6H PRN Florencia Elizondo PA-C      insulin lispro  1-5 Units Subcutaneous Q6H [address] Julianne Jordan DO      LORazepam  0 5 mg Oral Q8H PRN Lauri Graham PA-C      melatonin  6 mg Oral HS MATHEW Truong      metoprolol tartrate  25 mg Oral Q12H [address] Temitope Sesay MD      montelukast  10 mg Oral Daily MATHEW Truong      multivitamin-minerals  1 tablet Oral Daily MATHEW Truong      polyethylene glycol  17 g Oral Daily Mariusz Higuera [place]      senna-docusate sodium  1 tablet Oral HS Mariusz Higuera PA-C      sertraline  50 mg Oral Daily Shoaib Petersen MD      thiamine  100 mg Per NG Tube Daily Janice Leroy PA-C       Continuous Infusions:     PRN Meds:   acetaminophen, 650 mg, Q6H PRN  albuterol, 2 puff, Q4H PRN  bisacodyl, 10 mg, Daily PRN  dextromethorphan-guaiFENesin, 10 mL, Q4H PRN  fluticasone, 2 spray, Daily PRN  glycerin-hypromellose-, 1 drop, Q3H PRN  hydrALAZINE, 20 mg, Q6H PRN  LORazepam, 0 5 mg, Q8H PRN        Allergies   Allergies   Allergen Reactions    Codeine Other (See Comments)     agitated    Sulfamethoxazole-Trimethoprim GI Intolerance and Abdominal Pain     upset stomach     ---------------------------------------------------------------------------------------  Advance Directive and Living Will: Yes    Power of :    POLST:    ---------------------------------------------------------------------------------------  Care Time Delivered:       Octavia Shea,       Portions of the record may have been created with voice recognition software  Occasional wrong word or "sound a like" substitutions may have occurred due to the inherent limitations of voice recognition software    Read the chart carefully and recognize, using context, where substitutions have occurred

## 2021-01-19 NOTE — ASSESSMENT & PLAN NOTE
· Secondary to COVID-19 viral pneumonia   · Currently on mid flow 8L - had been on mechanical ventilation for 5 days, 3 days since extubation  · Atorvastatin 40mg daily  · Vitamin D/Vit C/Zinc  · Famotidine transitioned to Protonix per patient request  · Convalescent plasma 1/12  · Actemira completed 1/13  · Remdesivir completed 1/14  · Continue decadron today transition to prednisone 40 mg tomorrow taper down by 10mg q3 days

## 2021-01-19 NOTE — PLAN OF CARE
Problem: OCCUPATIONAL THERAPY ADULT  Goal: Performs self-care activities at highest level of function for planned discharge setting  See evaluation for individualized goals  Description: Treatment Interventions: ADL retraining, Functional transfer training, Endurance training, Patient/family training, Equipment evaluation/education, Continued evaluation, Energy conservation, Activityengagement  Equipment Recommended: Tub seat with back       See flowsheet documentation for full assessment, interventions and recommendations  Note: Limitation: Decreased ADL status, Decreased endurance, Decreased high-level ADLs, Decreased self-care trans     Assessment: Pt is a 67yo male admitted [facility] on 1/101/2021  Pt presents w/ COVID-19 and significant PMH impacting his occupational performance including CLL, hx concussion, anxiety/depression, HTN, R DANIEL, wrist sx  Pt tested + for COVID on 1/4/2021  Pt transferred from [facility]  Pt intubated on 1/12/2021 and extubated on 1/17/2021  Pt reports living w/ wife in apt PTA  Pt reports I w/ ADL/IADL w/ out O2 or AD  Upon eval, pt alert and generally oriented on 6L O2  Pt able to participate in conversation and follow directions w/ cues to sustain attention  Pt required S to complete bed mobility supine <> sit  Pt required S to complete sit <> stand and min A w/ out AD for short distance functional mobility  Pt required min A to complete LBD and mod I for + time, set- up to complete UBD  Pt engaged in 30 second sit to stand w/ score of 4 indicating + fall risk  Pt presents w/ decreased activity tolerance, decreased endurance, decreased standing tolerance, decreased standing balance, generalized weakness / deconditioning impacting his I w/ dressing, bathing, food prep / clean up, clothing mgmt, functional mobility, functional transfers, community mobility  Pt completing ADL below baseline level of I and would benefit from OT while in acute care to address deficits   Recommend discharge home to PLOF w/ social support pending progress, activity tolerance when medically stable for discharge from acte care   Will continue to follow     OT Discharge Recommendation: Home with skilled therapy(pending progress, activity tolerance)

## 2021-01-19 NOTE — ASSESSMENT & PLAN NOTE
· Outpatient follow-up with Oncology  · Heme-Onc following and informed about grossly elevated WBC count  · No concerns for leukostasis in the setting of infection in CLL according to Dr Margarito Fleming  · Recommendations appreciated

## 2021-01-19 NOTE — ASSESSMENT & PLAN NOTE
·  on 1/17  · Cardiology following  · Avoid QTC prolonging medications  · Much improved off of propofol   · Daily EKGs - 12 lead ECG 1/19/2021; SB, rate 53 bpm,  msec

## 2021-01-19 NOTE — PHYSICAL THERAPY NOTE
Physical Therapy Re-Evaluation and Treatment    Patient's Name: Jessica Duque    Admitting Diagnosis  COVID-19 virus infection [U07 1]    Problem List  Patient Active Problem List   Diagnosis    Essential hypertension    CLL (chronic lymphocytic leukemia) ([facility] )    Primary osteoarthritis of right hip    Allergic rhinitis due to pollen    Erectile dysfunction of non-organic origin    Esophageal reflux    Pulmonary hypertension ([place] [address] )    Spinal stenosis of lumbar region    Enlarged prostate without lower urinary tract symptoms (luts)    Iron deficiency anemia    Insomnia    Arthritis of right hip    Abnormal EKG    DDD (degenerative disc disease), cervical    DIXIE not currently treated    Hypercholesterolemia    Mild depression ([facility] )    History of colon polyps    Diverticulitis    Splenomegaly    BPH with obstruction/lower urinary tract symptoms    Alcohol abuse    Hyperglycemia    COVID-19    Acute respiratory failure with hypoxia ([facility])    QT prolongation    Sinus los-tachy syndrome ([facility] )    Anemia    Acute cystitis without hematuria    Transaminitis    NSVT (nonsustained ventricular tachycardia) ([facility])       Past Medical History  Past Medical History:   Diagnosis Date    Anxiety     BPH (benign prostatic hypertrophy)     CLL (chronic lymphocytic leukemia) ([place] [address] )     2009    Colon polyp     Concussion     Resolved: 08/22/16    Depression     Gastrointestinal hemorrhage     Last assessed: 08/27/13    GERD (gastroesophageal reflux disease)     Hearing loss of aging     Hiatal hernia     History of transfusion     Hyperlipidemia     Hypertension     Iron deficiency anemia     Microscopic hematuria     Last assessed: 06/28/13    OA (osteoarthritis)     right hip    Pneumothorax     Prostatitis     Pulmonary hypertension (HCC)     Seasonal allergies     UTI (urinary tract infection)     in past    Wears glasses        Past Surgical History  Past Surgical History: Procedure Laterality Date    COLONOSCOPY      CYSTOSCOPY  10/09/2014    Diagnostic    CYSTOSCOPY  06/29/2020    FRACTURE SURGERY      left lower arm    FRACTURE SURGERY      left femur    HERNIA REPAIR      JOINT REPLACEMENT Right     hip    OTHER SURGICAL HISTORY  03/2011    Spinal anesthesia epidural    DE TOTAL HIP ARTHROPLASTY Right 9/29/2017    Procedure: ARTHROPLASTY HIP TOTAL ANTERIOR;  Surgeon: Jovanni Cardoso MD;  Location: [facility];  Service: Orthopedics    TONSILLECTOMY AND ADENOIDECTOMY      TRANSURETHRAL RESECTION OF PROSTATE      x 2    WRIST SURGERY          01/19/21 1120   PT Last Visit   PT Visit Date 01/19/21   Note Type   Note type Re-Evaluation   Pain Assessment   Pain Assessment Tool 0-10   Pain Score No Pain   Home Living   Type of [address] One level;Stairs to enter with rails  (3 CHRISTOPHER)   Home Equipment   (DENIES)   Prior Function   Level of [place] Independent with ADLs and functional mobility   Lives With Spouse  (*CURRENTLY PATIENT IN [facility] PER PATIENT )   ADL Assistance Independent   IADLs Independent   Falls in the last 6 months 0   Vocational Retired   Restrictions/Precautions   [facility] Bearing Precautions Per Order No   Braces or Orthoses   (NONE)   Other Precautions Contact/isolation;O2;Multiple lines;Telemetry; Chair Alarm  (6 L O2; COVID PRECAUTIONS; CHAIR ALARM ACTIVE POST RE-EVAL)   General   Additional Pertinent History INTUBATED 1/13  EXTUBATED 1/17   Family/Caregiver Present No   Cognition   Overall Cognitive Status WFL   Arousal/Participation Alert   Attention Within functional limits   Orientation Level Oriented X4   Memory Within functional limits   Following Commands Follows multistep commands with increased time or repetition   Comments VERY PLEASANT AND MOTIVATED  EXPRESSED CONCERNS FOR WIFE WHO IS IN HOSPITAL      RUE Assessment   RUE Assessment WFL   LUE Assessment   LUE Assessment WFL   RLE Assessment   RLE Assessment WFL  (4-/5 GROSSLY )   LLE Assessment   LLE Assessment WFL  (4-/5 GROSSLY )   Bed Mobility   Supine to Sit Unable to assess   Sit to Supine Unable to assess   Additional Comments SEATED IN CHAIR    Transfers   Sit to Stand 5  Supervision   Additional items Increased time required;Verbal cues   Stand to Sit 5  Supervision   Additional items Increased time required;Verbal cues   Ambulation/Elevation   Gait pattern Short stride;Decreased foot clearance   Gait Assistance 5  Supervision   Additional items Verbal cues   Assistive Device Rolling walker   Distance 5 FEET FORWARDS/5 FEET BACKWARDS    Balance   Static Sitting Good   Static Standing Fair   Ambulatory Fair -   Endurance Deficit   Endurance Deficit Yes   Endurance Deficit Description 6 L O2  O2 SATS 88%-95%  Activity Tolerance   Activity Tolerance Patient tolerated treatment well;Patient limited by fatigue   Nurse Made Aware [place] TO SEE PER RN APRIL    Assessment   Prognosis Good   Problem List Decreased strength;Decreased endurance; Impaired balance;Decreased mobility   Assessment PT COMPLETED RE-EVALUATION OF 78YEAR OLD MALE ADMITTED TO [facility] ON 1/10/2021 (TRANSFER FROM [address] [place] [address] AFTER MONOCLONAL ANTIBODY INFUSION FOR COVID-19) WITH HYPOTENSION AND HYPOXIA  INITIAL PT EVALUATION WAS PERFORMED ON 1/11/2021 DURING WHICH HE AMBULATED 25 FEET AND WAS RECOMMENDED FOR DISCHARGE HOME  1/13/2021 PATIENT WAS INTUBATED FOR WORSENING RESPIRATORY STATUS  1/17/2021 PATIENT EXTUBATED AND PRESENTS TODAY ON NASAL CANNULA AND MEDICALLY APPROPRIATE FOR PT RE-EVALUATION  DURING RE-EVALUATION PATIENT RECEIVED SEATED ON CHAIR ON 6 L O2 (93% AT REST)  HE WAS ABLE TO PERFORM SIT<-->STAND TRANSFER AND SHORT DISTANCE AMBULATION SUPERVISION LEVEL  PATIENT AMBULATED 5 FEET FORWARDS/5 FEET BACKWARDS WITH USE OF RW  DISTANCE LIMITED BY LINES/DESATURATION (88%)  ANTICIPATE WITH CONTINUED MOBILITY THIS ADMISSION PATIENT WILL ACHIEVE PT GOALS AND D/C HOME WITH HHPT   WILL LIKELY REQUIRES USE OF RW VERSUS SPC  PATIENT WILL BENEFIT FROM CONTINUED SKILLED PT THIS ADMISSION TO ACHIEVE MAXIMAL FUNCTION AND SAFETY  Goals   Patient Goals TO GO HOME AND BE WITH HIS WIFE    LTG Expiration Date 02/02/21   Long Term Goal #1 1) PERFORM BED MOBILITY MOD-I TO PARTICIPATE IN FREQUENT REPOSITIONING AND IMPROVE SKIN INTEGRITY; 2) PERFORM SIT<-->STAND TRANSFER MOD-I TO PROMOTE I WITH TOILETING AND OOB MOBILITY; 3) AMBULATE 200 FEET MOD-I W/ LEAST RESTRICTIVE DEVICE TO PARTICIPATE IN HOUSEHOLD AND COMMUNITY LEVEL AMBULATION; 4) IMPROVE B/L LE STRENGTH BY 1/2 GRADE TO IMPROVE EFFICIENCY OF TRANSFERS; 5) IMPROVE BALANCE BY 1 GRADE TO REDUCE RISK FOR FALLS; 6) NAVIGATE 3 STEPS S LEVEL IN ORDER TO SAFELY NAVIGATE IN/OUT OF HOME   PT Treatment Day 0  (RE-EVAL)   Plan   Treatment/Interventions Functional transfer training;LE strengthening/ROM; Elevations; Therapeutic exercise; Endurance training;Patient/family training;Equipment eval/education; Bed mobility;Gait training;OT;Spoke to nursing   PT Frequency 5x/wk   Recommendation   PT Discharge Recommendation Return to previous environment with social support;Home with skilled therapy  (HOME PT)   Equipment Recommended Walker  (WILL LIKELY REQUIRE RW TO D/C HOME WITH )   PT - OK to Discharge No  (IMPROVE AMBULATORY DISTANCE)     ADDITIONAL TREATMENT SESSION PERFORMED TODAY BELOW ([id number]; 29 MINUTES)    SUBJECTIVE: "I CAN'T WAIT TO BE HOME WITH MY WIFE"    OBJECTIVE: PATIENT PARTICIPATED IN THE FOLLOWING SEATED THERE-EX:  LONG ARC QUADS, MARCHES, HIP ABDUCTION, AND HEEL SLIDES WHILE SEATED IN CHAIR  10 REPS B/L DEMONSTRATING GOOD TOLERANCE  HE PERFORMED 3 SIT<-->STAND TRANSFERS WITH SUPERVISION  THE 1ST STAND HE MAINTAINED X 15 SECONDS, THE 2ND STAND HE MAINTAINED X 30 SECONDS, AND THE 3RD STAND HE PERFORMED MARCHING IN PLACE X 20 SECONDS  O2 SATS WERE MONITORED THROUGHOUT TREATMENT SESSION (88%-95% ON 6 L)       ASSESSMENT: THIS PATIENT'S FUNCTIONAL MOBILITY CONTINUES TO BE LIMITED BY DECREASED ACTIVITY TOLERANCE  PATIENT IS VERY MOTIVATED TO GET BETTER AND EXPRESSES DESIRE  [address]  HE PARTICIPATED IN SEATED AND STANDING THERE-EX THIS SESSION TO IMPROVE LE STRENGTH AND OVERALL ACTIVITY TOLERANCE  ANTICIPATE WITH CONTINUED MOBILITY HE WILL ACHIEVE GOALS AND D/C HOME WITH USE OF [place] [facility] AND HHPT  PATIENT WILL BENEFIT FROM CONTINUED SKILLED PT THIS ADMISSION TO ACHIEVE MAXIMAL FUNCTION AND SAFETY  PLAN: NEXT SESSION PLAN TO PROGRESS AMBULATORY DISTANCE AS TOLERATED AND APPROPRIATE         Knutson Betters, PT, DPT

## 2021-01-19 NOTE — QUICK NOTE
I personally spoke with Murphy's son, Jamal Collins, and gave him a clinical update  He is aware that he will be transferred to a medical surgical flood with telemetry  All questions answered

## 2021-01-19 NOTE — ASSESSMENT & PLAN NOTE
· CXR bilateral ground-glass opacities worsened from 01/10  · Severe COVID-19 tx algorithm/Positive 1/4  · BC no growth  MEDICATIONS  · Decadron 0 1 milligram/kilogram D 7/10/Pepcid b i d   · Doxy/ceftriazone escalated to cefepime/Vanco currently day 5 of 5   · Vit C/Zinc MV/Vit D  · Atorvastatin transition home dose on discharge   · Enoxaparin therapeutic  · Remdesimir completed 1/14   · Plasma 1/11  · Actemra 1/13 not redosed given down trending inflammatory marker  · IHepatitis negative  · HIV negative

## 2021-01-19 NOTE — PLAN OF CARE
Problem: PHYSICAL THERAPY ADULT  Goal: Performs mobility at highest level of function for planned discharge setting  See evaluation for individualized goals  Description: Treatment/Interventions: Functional transfer training, LE strengthening/ROM, Elevations, Therapeutic exercise, Endurance training, Patient/family training, Equipment eval/education, Bed mobility, Gait training          See flowsheet documentation for full assessment, interventions and recommendations  Outcome: Progressing  Note: Prognosis: Good  Problem List: Decreased strength, Decreased endurance, Impaired balance, Decreased mobility  Assessment: PT COMPLETED RE-EVALUATION OF 78YEAR OLD MALE ADMITTED TO [facility] ON 1/10/2021 (TRANSFER FROM [facility] AFTER MONOCLONAL ANTIBODY INFUSION FOR COVID-19) WITH HYPOTENSION AND HYPOXIA  INITIAL PT EVALUATION WAS PERFORMED ON 1/11/2021 DURING WHICH HE AMBULATED 25 FEET AND WAS RECOMMENDED FOR DISCHARGE HOME  1/13/2021 PATIENT WAS INTUBATED FOR WORSENING RESPIRATORY STATUS  1/17/2021 PATIENT EXTUBATED AND PRESENTS TODAY ON NASAL CANNULA AND MEDICALLY APPROPRIATE FOR PT RE-EVALUATION  DURING RE-EVALUATION PATIENT RECEIVED SEATED ON CHAIR ON 6 L O2 (93% AT REST)  HE WAS ABLE TO PERFORM SIT<-->STAND TRANSFER AND SHORT DISTANCE AMBULATION SUPERVISION LEVEL  PATIENT AMBULATED 5 FEET FORWARDS/5 FEET BACKWARDS WITH USE OF RW  DISTANCE LIMITED BY LINES/DESATURATION (88%)  ANTICIPATE WITH CONTINUED MOBILITY THIS ADMISSION PATIENT WILL ACHIEVE PT GOALS AND D/C HOME WITH HHPT  WILL LIKELY REQUIRES USE OF RW VERSUS SPC  PATIENT WILL BENEFIT FROM CONTINUED SKILLED PT THIS ADMISSION TO ACHIEVE MAXIMAL FUNCTION AND SAFETY  PT Discharge Recommendation: Return to previous environment with social support, Home with skilled therapy(HOME PT)     PT - OK to Discharge: No(IMPROVE AMBULATORY DISTANCE)    See flowsheet documentation for full assessment

## 2021-01-19 NOTE — TELEPHONE ENCOUNTER
Will print and discuss with Dr Jackson--however he is NOT the rounding physician at this time   Whom ever is rounding currently would be the physician to see this patient in the hospital

## 2021-01-19 NOTE — ASSESSMENT & PLAN NOTE
· TSH 0 3 awaiting free T4  · Resolved since weaned off of propofol  · Hemodynamically stable  · Cardiology following

## 2021-01-19 NOTE — ASSESSMENT & PLAN NOTE
· Large leukocytes/innumerable wbc's/occasional bacteria  · Did not reflex  · Received 4 days of cefepime which covers

## 2021-01-19 NOTE — PROGRESS NOTES
General Cardiology   Progress Note -  [employer]   Walker Parks 78 y o  male MRN: [id number]    Unit/Bed#: ICU 08 Encounter: [id number]    Assessment  1  Sinus bradycardia -- 2/2 to sedation  2  NSVT -- resolved  12 lead ECG 2021; SB, rate 53 bpm,  msec  -24 hr telemetry review; NSR, occasional PAC's  -Had been started on metoprolol tartrate 25 mg q12h yesterday  -Electrolytes stable on BMP, TSH 0 325/ free t4 1 28    3  Acute hypoxic respiratory failure  4  COVID-19 PNA  -Management per CC service     5  HTN  -Avg /77, last recorded at 155/99, HR 56  -On amlodipine 5 mg daily, and metoprolol tartrate 25 mg q12h    6  Dyslipidemia  -On atorvastatin 40 mg daily    Plan  -Continue metoprolol at current dose   -Avoid QT prolongating medications  -Monitor on telemetry  -Maintain K+ at 4, and mag at 2    Subjective  Review of Systems   Constitution: Negative for chills, fever and malaise/fatigue  HENT: Negative for congestion  Eyes: Negative for visual disturbance  Respiratory: Negative for cough and shortness of breath  Gastrointestinal: Negative for abdominal pain  Genitourinary: Negative for dysuria  Neurological: Negative for dizziness, focal weakness, headaches, light-headedness and weakness  All other systems reviewed and are negative  Objective:   Physical Exam  Vitals signs and nursing note reviewed  Constitutional:       Appearance: Normal appearance  Cardiovascular:      Rate and Rhythm: Normal rate and regular rhythm  Neurological:      Mental Status: He is alert and oriented to person, place, and time  Psychiatric:         Mood and Affect: Mood normal         Vitals: Blood pressure 155/99, pulse 56, temperature 98 4 °F (36 9 °C), temperature source Oral, resp  rate 18, height 5' 6" (1 676 m), weight 77 2 kg (170 lb 3 1 oz), SpO2 (!) 89 %  ,     Body mass index is 27 47 kg/m²  ,   Systolic (79LVO), AWJ:760 , Min:123 , JRO:259     Diastolic (25PFQ), YK, Min:65, Max:99      Intake/Output Summary (Last 24 hours) at 1/19/2021 0858  Last data filed at 1/19/2021 0501  Gross per 24 hour   Intake 600 ml   Output 1325 ml   Net -725 ml     Weight (last 2 days)     Date/Time   Weight    01/19/21 0559   77 2 (170 2)    01/18/21 0542   88 8 (195 77)    01/17/21 0600   88 8 (195 77)              LABORATORY RESULTS  Results from last 7 days   Lab Units 01/14/21  0559 01/14/21  0254 01/13/21  2340   TROPONIN I ng/mL 0 22* 0 28* 0 34*     CBC with diff:   Results from last 7 days   Lab Units 01/19/21  0507 01/18/21  0600 01/17/21  0403 01/16/21  0358 01/15/21  0324 01/14/21  1628 01/14/21  0558  01/13/21  1151 01/13/21  0757 01/12/21  1609   WBC Thousand/uL 166 27* 164 90* 158 52* 125 45* 86 46*  --  74 16*  --  70 15* 84 73* 110 11*   HEMOGLOBIN g/dL 9 8* 9 9* 9 8* 8 8* 7 2* 7 2* 7 2*   < > 5 8* 6 8* 7 8*   HEMATOCRIT % 33 4* 33 9* 32 5* 30 2* 23 7* 23 6* 22 8*   < > 19 3* 21 9* 25 7*   MCV fL 102* 102* 101* 102* 101*  --  99*  --  104* 103* 101*   PLATELETS Thousands/uL 319 364 436* 384 276  --  266  --  293 318 359   MCH pg 29 9 29 7 30 3 29 7 30 6  --  31 3  --  31 2 32 1 30 6   MCHC g/dL 29 3* 29 2* 30 2* 29 1* 30 4*  --  31 6  --  30 1* 31 1* 30 4*   RDW % 20 9* 20 9* 20 4* 20 2* 19 5*  --  18 4*  --  17 2* 17 6* 16 7*   MPV fL 9 7 10 0 9 9 9 8 10 0  --  9 7  --  9 6 9 5 9 7   NRBC AUTO /100 WBCs 0  --  0 0 0  --  0  --   --  0 0    < > = values in this interval not displayed         CMP:  Results from last 7 days   Lab Units 01/19/21  0507 01/18/21  0859 01/17/21  0403 01/16/21  1127 01/16/21  0358 01/15/21  0324 01/14/21  1628 01/14/21  0558  01/13/21  1121 01/13/21  0511   POTASSIUM mmol/L 5 2 5 3 5 0 5 3 5 6* 4 7 4 6 4 9   < > 4 8 4 4   CHLORIDE mmol/L 97* 97* 101 102 104 105 103 102   < > 101 99*   CO2 mmol/L 31 34* 32 30 29 26 26 24   < > 26 27   BUN mg/dL 23 26* 19 19 19 18 19 21   < > 20 23   CREATININE mg/dL 0 71 0 66 0 57* 0 58* 0 74 0 65 0 66 0 61   < > 0 77 0 72   CALCIUM mg/dL 8 3 8 7 8 2* 8 1* 7 9* 7 9* 7 8* 7 8*   < > 8 0* 7 6*   AST U/L  --  46*  --   --  34 36  --  67*  --  85* 103*   ALT U/L  --  83*  --   --  77 94*  --  99*  --  134* 124*   ALK PHOS U/L  --  133*  --   --  127* 116  --  115  --  137* 132*   EGFR ml/min/1 73sq m 96 99 106 105 95 100 99 103   < > 93 96    < > = values in this interval not displayed         BMP:  Results from last 7 days   Lab Units 01/19/21  0507 01/18/21  0859 01/17/21  0403 01/16/21  1127 01/16/21  0358 01/15/21  0324 01/14/21  1628   POTASSIUM mmol/L 5 2 5 3 5 0 5 3 5 6* 4 7 4 6   CHLORIDE mmol/L 97* 97* 101 102 104 105 103   CO2 mmol/L 31 34* 32 30 29 26 26   BUN mg/dL 23 26* 19 19 19 18 19   CREATININE mg/dL 0 71 0 66 0 57* 0 58* 0 74 0 65 0 66   CALCIUM mg/dL 8 3 8 7 8 2* 8 1* 7 9* 7 9* 7 8*       No results found for: NTBNP     Results from last 7 days   Lab Units 01/19/21  0507 01/17/21  0403 01/16/21  0358 01/15/21  0324 01/14/21  0558 01/13/21  0511   MAGNESIUM mg/dL 2 3 2 1 2 2 2 5 2 5 2 0       Results from last 7 days   Lab Units 01/16/21  0358   HEMOGLOBIN A1C % 5 6       Results from last 7 days   Lab Units 01/15/21  1553   TSH 3RD GENERATON uIU/mL 0 325*   FREE T4 ng/dL 1 28       Results from last 7 days   Lab Units 01/13/21  2340   INR  1 61*       Lipid Profile:   Lab Results   Component Value Date    CHOL 144 01/20/2015    CHOL 146 07/01/2014    CHOL 151 01/10/2014     Lab Results   Component Value Date    HDL 50 12/31/2019    HDL 55 02/18/2019    HDL 58 02/12/2018     Lab Results   Component Value Date    LDLCALC 63 12/31/2019    LDLCALC 57 02/18/2019    LDLCALC 62 02/12/2018     Lab Results   Component Value Date    TRIG 117 01/16/2021    TRIG 53 12/31/2019    TRIG 70 02/18/2019       Cardiac testing:   Results for orders placed during the hospital encounter of 01/10/21   Echo complete with contrast if indicated    Narrative [address] [facility]  [phone]    Transthoracic Echocardiogram  2D, M-mode, Doppler, and Color Doppler    Study date:  15-Som-2021    Patient: Austyn Vanegas  MR number: [id number]  Account number: [de-identified]  : 1950  Age: 78 years  Gender: Male  Status: Inpatient  Location: ICU  Height: 66 in  Weight: 195 lb  BP: 149/ 71 mmHg    Indications: New V-Tach, COVID-19  Diagnoses: I47 2 - Ventricular tachycardia    Sonographer:  HARJINDER Apodaca  Primary Physician:  Tiffany Verduzco MD  Referring Physician:  Jeanne Tavarez MD  Group:  [facility]  Interpreting Physician:  Davian Serna DO    SUMMARY    SUMMARY:  Limited study  LEFT VENTRICLE:  Systolic function was normal  Ejection fraction was estimated to be 60 %  Although no diagnostic regional wall motion abnormality was identified, this possibility cannot be completely excluded on the basis of this study  RIGHT VENTRICLE:  The ventricle was mildly to moderately dilated  Systolic function was mildly reduced  Free wall hypokinesis  LEFT ATRIUM:  The atrium was dilated  MITRAL VALVE:  There was mild regurgitation  AORTIC VALVE:  There was mild to moderate regurgitation  TRICUSPID VALVE:  There was moderate regurgitation  Estimated peak PA pressure was 75 mmHg  IVC, HEPATIC VEINS:  The inferior vena cava was dilated, with poor inspiratory collapse, consistent with elevated right atrial pressure  HISTORY: PRIOR HISTORY: COVID-19 +, hypertension, PHTN, Luekemia, Alcohol Abuse, Former Smoker  PROCEDURE: The study was performed in the ICU  This was a routine study  The transthoracic approach was used  The study included complete 2D imaging, M-mode, complete spectral Doppler, and color Doppler  The heart rate was 47 bpm, at the  start of the study  Images were obtained from the parasternal, apical, subcostal, and suprasternal notch acoustic windows  Image quality was adequate      LEFT VENTRICLE: Size was normal  Systolic function was normal  Ejection fraction was estimated to be 60 %  Although no diagnostic regional wall motion abnormality was identified, this possibility cannot be completely excluded on the basis  of this study  RIGHT VENTRICLE: The ventricle was mildly to moderately dilated  Systolic function was mildly reduced  Free wall hypokinesis  Wall thickness was normal     LEFT ATRIUM: The atrium was dilated  RIGHT ATRIUM: Size was normal     MITRAL VALVE: Valve structure was normal  There was normal leaflet separation  DOPPLER: The transmitral velocity was within the normal range  There was no evidence for stenosis  There was mild regurgitation  AORTIC VALVE: The valve was trileaflet  Leaflets exhibited normal thickness and normal cuspal separation  DOPPLER: Transaortic velocity was within the normal range  There was no evidence for stenosis  There was mild to moderate  regurgitation  TRICUSPID VALVE: The valve structure was normal  There was normal leaflet separation  DOPPLER: The transtricuspid velocity was within the normal range  There was no evidence for stenosis  There was moderate regurgitation  Estimated peak PA  pressure was 75 mmHg  PULMONIC VALVE: Not well visualized  PERICARDIUM: There was no pericardial effusion  The pericardium was normal in appearance  AORTA: The root exhibited normal size  SYSTEMIC VEINS: IVC: The inferior vena cava was dilated, with poor inspiratory collapse, consistent with elevated right atrial pressure      SYSTEM MEASUREMENT TABLES    2D  %FS: 33 33 %  Ao Diam: 3 62 cm  EDV(Teich): 99 88 ml  EF(Teich): 62 02 %  ESV(Teich): 37 93 ml  IVSd: 1 15 cm  LA Area: 26 46 cm2  LA Diam: 4 59 cm  LVEDV MOD A4C: 166 8 ml  LVEF MOD A4C: 60 98 %  LVESV MOD A4C: 65 09 ml  LVIDd: 4 65 cm  LVIDs: 3 1 cm  LVLd A4C: 9 42 cm  LVLs A4C: 7 89 cm  LVPWd: 1 03 cm  RA Area: 16 54 cm2  RVIDd: 5 01 cm  SV MOD A4C: 101 71 ml  SV(Teich): 61 94 ml    CW  AR Dec Calvert: 3 09 m/s2  AR Dec Time: 1445 59 ms  AR PHT: 419 22 ms  AR Vmax: 4 47 m/s  AR maxP 75 mmHg  AV Env  Ti: 304 47 ms  AV MaxP 7 mmHg  AV VTI: 33 72 cm  AV Vmax: 1 78 m/s  AV Vmean: 1 11 m/s  AV meanP 58 mmHg  MR VTI: 204 77 cm  MR Vmax: 4 56 m/s  MR Vmean: 3 84 m/s  MR maxP 27 mmHg  MR meanP 52 mmHg  TR MaxP 73 mmHg  TR Vmax: 3 96 m/s    PW  LVOT Env  Ti: 374 88 ms  LVOT VTI: 24 53 cm  LVOT Vmax: 1 04 m/s  LVOT Vmean: 0 65 m/s  LVOT maxP 42 mmHg  LVOT meanP 05 mmHg  MV A Adithya: 0 16 m/s  MV Dec Lamb: 2 45 m/s2  MV DecT: 254 72 ms  MV E Adithya: 0 62 m/s  MV E/A Ratio: 3 99  MV PHT: 73 87 ms  MVA By PHT: 2 98 cm2    [address] [facility]    Prepared and electronically signed by    Joaquin Beauchamp DO  Signed 15-Som-2021 12:34:36       No results found for this or any previous visit  No results found for this or any previous visit  No procedure found  No results found for this or any previous visit      Meds/Allergies   all current active meds have been reviewed, current meds:   Current Facility-Administered Medications   Medication Dose Route Frequency    acetaminophen (TYLENOL) tablet 650 mg  650 mg Oral Q6H PRN    albuterol (PROVENTIL HFA,VENTOLIN HFA) inhaler 2 puff  2 puff Inhalation Q4H PRN    amLODIPine (NORVASC) tablet 5 mg  5 mg Oral QPM    aspirin (ECOTRIN LOW STRENGTH) EC tablet 81 mg  81 mg Oral Daily    atorvastatin (LIPITOR) tablet 40 mg  40 mg Per NG Tube HS    bisacodyl (DULCOLAX) rectal suppository 10 mg  10 mg Rectal Daily PRN    chlorhexidine (PERIDEX) 0 12 % oral rinse 15 mL  15 mL Swish & Spit Q12H [address]    cholecalciferol (VITAMIN D3) tablet 2,000 Units  2,000 Units Per NG Tube Daily    dexamethasone (PF) (DECADRON) injection 8 5 mg  0 1 mg/kg Intravenous Q12H    dextromethorphan-guaiFENesin (ROBITUSSIN DM) oral syrup 10 mL  10 mL Oral Q4H PRN    enoxaparin (LOVENOX) subcutaneous injection 80 mg  1 mg/kg Subcutaneous Q12H [address]    fluticasone (FLONASE) 50 mcg/act nasal spray 2 spray  2 spray Nasal Daily PRN    folic acid (FOLVITE) tablet 1 mg  1 mg Oral Daily    glycerin-hypromellose- (ARTIFICIAL TEARS) ophthalmic solution 1 drop  1 drop Both Eyes Q3H PRN    hydrALAZINE (APRESOLINE) injection 20 mg  20 mg Intravenous Q6H PRN    insulin lispro (HumaLOG) 100 units/mL subcutaneous injection 1-5 Units  1-5 Units Subcutaneous Q6H [address]    LORazepam (ATIVAN) tablet 0 5 mg  0 5 mg Oral Q8H PRN    melatonin tablet 6 mg  6 mg Oral HS    metoprolol tartrate (LOPRESSOR) tablet 25 mg  25 mg Oral Q12H CHET    montelukast (SINGULAIR) tablet 10 mg  10 mg Oral Daily    multivitamin-minerals (CENTRUM ADULTS) tablet 1 tablet  1 tablet Oral Daily    pantoprazole (PROTONIX) EC tablet 40 mg  40 mg Oral Early Morning    polyethylene glycol (MIRALAX) packet 17 g  17 g Oral Daily    [START ON 1/20/2021] predniSONE tablet 40 mg  40 mg Oral Daily    Followed by   Fei Merchant ON 1/23/2021] predniSONE tablet 30 mg  30 mg Oral Daily    Followed by   Fei Merchant ON 1/26/2021] predniSONE tablet 20 mg  20 mg Oral Daily    Followed by   Fei Merchant ON 1/29/2021] predniSONE tablet 10 mg  10 mg Oral Daily    senna-docusate sodium (SENOKOT S) 8 6-50 mg per tablet 1 tablet  1 tablet Oral HS    sertraline (ZOLOFT) tablet 50 mg  50 mg Oral Daily    tamsulosin (FLOMAX) capsule 0 4 mg  0 4 mg Oral Daily With Dinner    thiamine (VITAMIN B1) tablet 100 mg  100 mg Per NG Tube Daily    and PTA meds:   Prior to Admission Medications   Prescriptions Last Dose Informant Patient Reported? Taking?    Milk Thistle 1000 MG CAPS  Self Yes No   Sig: Take 250 mg by mouth    Omega-3 Fatty Acids (FISH OIL) 1,000 mg  Self Yes No   Sig: Take by mouth daily   amLODIPine (NORVASC) 5 mg tablet  Self No No   Sig: TAKE ONE TABLET BY MOUTH EVERY EVENING   aspirin 81 MG tablet  Self Yes No   Sig: Take 1 tablet by mouth daily   atorvastatin (LIPITOR) 20 mg tablet  Self No No   Sig: TAKE ONE TABLET BY MOUTH EVERY DAY carvedilol (COREG) 12 5 mg tablet  Self No No   Sig: TAKE 1 TABLET BY MOUTH TWO TIMES A DAY WITH MEALS   dextromethorphan-guaiFENesin (ROBITUSSIN DM)  mg/5 mL syrup   No No   Sig: Take 5 mL by mouth 3 (three) times a day as needed for cough   fluticasone (FLONASE) 50 mcg/act nasal spray  Self No No   Si sprays into each nostril daily as needed for allergies   losartan (COZAAR) 100 MG tablet  Self No No   Sig: TAKE ONE TABLET BY MOUTH EVERY DAY   minoxidil (LONITEN) 10 mg tablet   No No   Sig: TAKE ONE TABLET BY MOUTH ONCE EVERY DAY   montelukast (SINGULAIR) 10 mg tablet  Self No No   Sig: Take 1 tablet (10 mg total) by mouth daily   naproxen (EC NAPROSYN) 500 MG EC tablet  Self No No   Sig: Take 1 tablet (500 mg total) by mouth 2 (two) times a day with meals   Patient taking differently: Take 500 mg by mouth as needed    omeprazole (PriLOSEC) 40 MG capsule  Self No No   Sig: TAKE ONE CAPSULE BY MOUTH EVERY DAY   sertraline (ZOLOFT) 50 mg tablet  Self No No   Sig: TAKE ONE TABLET BY MOUTH EVERY DAY   tamsulosin (FLOMAX) 0 4 mg 2021 at Unknown time Self No Yes   Sig: Take 1 capsule (0 4 mg total) by mouth daily with dinner CORRECTED SCRIPT! Note change in SIG and QUANTITY  Please process accordingly    Thank you! (20)   zolpidem (AMBIEN) 10 mg tablet Past Week at Unknown time Self No Yes   Sig: TAKE ONE TABLET BY MOUTH AT BEDTIME AS NEEDED SLEEP      Facility-Administered Medications: None          EKG personally reviewed by MATHEW Padgett    Assessment:  Principal Problem:    COVID-19  Active Problems:    Essential hypertension    CLL (chronic lymphocytic leukemia) (HCC)    Pulmonary hypertension (HCC)    Hypercholesterolemia    Mild depression (HCC)    Diverticulitis    BPH with obstruction/lower urinary tract symptoms    Alcohol abuse    Hyperglycemia    Acute respiratory failure with hypoxia (HCC)    QT prolongation    Sinus los-tachy syndrome (HCC)    Anemia    Acute cystitis without hematuria    Transaminitis    Counseling / Coordination of Care  Total floor / unit time spent today 20 minutes  Greater than 50% of total time was spent with the patient and / or family counseling and / or coordination of care  ** Please Note: Dragon 360 Dictation voice to text software may have been used in the creation of this document   **

## 2021-01-19 NOTE — PROGRESS NOTES
Transfer Note - Antoine Gresham 1950, 78 y o  male MRN: [id number]    Unit/Bed#: ICU 08 Encounter: [id number]    Primary Care Provider: Owen Ge MD   Date and time admitted to hospital: 1/10/2021 12:16 PM        NSVT (nonsustained ventricular tachycardia) ([facility])  Assessment & Plan  · Carvedilol 12 5 mg BID switched to metoprolol 25 mg BID in effort to suppress  · Intermittent PVVC and bigeminy overnight    Transaminitis  Assessment & Plan  · With elevated t bili  · Improving   · RUQ U/S negative    Acute cystitis without hematuria  Assessment & Plan  · Large leukocytes/innumerable wbc's/occasional bacteria  · Did not reflex  · Received 4 days of cefepime which covers    Anemia  Assessment & Plan  · Baseline hemoglobin 12  · Status post 2 units PRBCs between 1/13 and 1/14  · No signs of active bleeding  · No signs of blood in stool  · Hgb has stabilized at 9 8 today  · Check occult/iron B12 and folate - likely anemia of chronic disease  · CBC in the a m      Sinus los-tachy syndrome (HCC)  Assessment & Plan  · TSH 0 3 awaiting free T4  · Resolved since weaned off of propofol  · Hemodynamically stable  · Cardiology following    QT prolongation  Assessment & Plan  ·  on 1/17  · Cardiology following  · Avoid QTC prolonging medications  · Much improved off of propofol   · Daily EKGs - 12 lead ECG 1/19/2021; SB, rate 53 bpm,  msec    Acute respiratory failure with hypoxia (HCC)  Assessment & Plan  · Secondary to COVID-19 viral pneumonia   · Currently on mid flow 8L - had been on mechanical ventilation for 5 days, 3 days since extubation  · Atorvastatin 40mg daily  · Vitamin D/Vit C/Zinc  · Famotidine transitioned to Protonix per patient request  · Convalescent plasma 1/12  · Sharlotte Lazier completed 1/13  · Remdesivir completed 1/14  · Continue decadron today transition to prednisone 40 mg tomorrow taper down by 10mg q3 days    Hyperglycemia  Assessment & Plan  · On steroids  · A1c 5 6  · Continue sliding scale insulin algorithm 1    Alcohol abuse  Assessment & Plan  · No signs and symptoms of alcohol withdrawal  ·  Thiamine/folate  · Not a daily drinker    BPH with obstruction/lower urinary tract symptoms  Assessment & Plan  · Flomax restarted  · Self cath as needed    Diverticulitis  Assessment & Plan  · Noted acute uncomplicated sigmoid diverticulitis on CT scan    Mild depression (HCC)  Assessment & Plan  · Continue home Zoloft    Hypercholesterolemia  Assessment & Plan  · Currently on COVID dose statin protocol    Pulmonary hypertension ([facility] [address] )  Assessment & Plan  · PA pressure of 75  · RV mildly down  · Outpatient follow-up    CLL (chronic lymphocytic leukemia) ([facility] [address] )  Assessment & Plan  · Outpatient follow-up with Oncology  · Heme-Onc following and informed about grossly elevated WBC count  · No concerns for leukostasis in the setting of infection in CLL according to Dr Mathew Kocher  · Recommendations appreciated    Essential hypertension  Assessment & Plan  · Continue amlodipine 5mg daily  · Holding losartan in the setting of hyperkalemia  · Cardiology recommendations appreciated  · Now on metoprolol 25mg BID rather than home Carvedilol 12 5 BID in the setting of NSVTs  · Hydralizine PRN    * COVID-19  Assessment & Plan  · CXR bilateral ground-glass opacities worsened from 01/10  · Severe COVID-19 tx algorithm/Positive 1/4  · BC no growth  MEDICATIONS  · Decadron 0 1 milligram/kilogram D 7/10/Pepcid b i d   · Doxy/ceftriazone escalated to cefepime/Vanco currently day 5 of 5   · Vit C/Zinc MV/Vit D  · Atorvastatin transition home dose on discharge   · Enoxaparin therapeutic  · Remdesimir completed 1/14   · Plasma 1/11  · Actemra 1/13 not redosed given down trending inflammatory marker  · IHepatitis negative  · HIV negative        Code Status: Level 1 - Full Code  POA:    POLST:      Reason for ICU admission:   Hypoxia and hypotension    Active problems:   Principal Problem:    COVID-19  Active Problems:    Essential hypertension    CLL (chronic lymphocytic leukemia) (HCC)    Pulmonary hypertension (HCC)    Hypercholesterolemia    Mild depression (HCC)    Diverticulitis    BPH with obstruction/lower urinary tract symptoms    Alcohol abuse    Hyperglycemia    Acute respiratory failure with hypoxia (HCC)    QT prolongation    Sinus los-tachy syndrome (HCC)    Anemia    Acute cystitis without hematuria    Transaminitis    NSVT (nonsustained ventricular tachycardia) (HCC)  Resolved Problems:    * No resolved hospital problems  *      Consultants:   Cardiology  Heme-Onc    History of Present Illness:   Batool Musa is a 78 y o  male who presents with acute hypoxic respiratory failure  The patient has a past medical history significant for urinary retention, CLL  The patient reports he was in his usual state of health until COVID 19 symtoms started 1/2 and tested positive on 1/4  He had persistent symptoms and did not receive Bamlanivumab due to being hypoxic and hypotensive at the infusion center and was requiring HFNC in the ED and thus transferred to ICU on 1/10  Summary of clinical course:   When admitted to the unit patient was on HFNC  Patient was intubated on 1/13 for worsening acute hypoxic respiratory failure  He received propofol and precidex both of which were weaned with fentanyl increased instead in the setting of bradycardia and increased NSVTs (with improvement following wean)  He was treated per the severe COVID protocol with convalescent plasma received 1/12, Actemra received 1/13, Remdesivir completed 1/14  Has been on 0 1mg/kg decadron for 10days  Receiving atorvastatin, famotidine, zinc, vitamin C, vitamin D, and multivitamin  Patient was extubated on 1/17 due to improved breathing status  He has retained mental status AAOx3 and has been titrating down over the past 3 days off of the vent now on 8L midflow       Recent or scheduled procedures:   N/A    Outstanding/pending diagnostics:   N/A    Cultures:   Blood cultures and repeats no growth after 5 days  No growth on sputum culture  MRSA negative  Mobilization Plan:   Per PT/OT    Nutrition Plan:   House diet    Invasive Devices Review  Invasive Devices     Peripheral Intravenous Line            Long-Dwell Peripheral IV (Midline) 09/71/10 Left Cephalic 5 days    Peripheral IV 01/16/21 Right Forearm 3 days                Rationale for remaining devices: N/A    VTE Pharmacologic Prophylaxis: Enoxaparin (Lovenox)  VTE Mechanical Prophylaxis: sequential compression device    Discharge Plan:   Discharge planning per SLIM team    Initial Physical Therapy Recommendations: Home PT  Initial Occupational Therapy Recommendations: pending  Initial /Plan: pending    Home medications that are not reordered and reason why:   Carvedilol - See SEA, Losartan see SEA, minoxidil - await Cardiology recs    Spoke with Dr Jessie Hemphill  regarding transfer  Please contact critical care via [employer] with any questions or concerns  Portions of the record may have been created with voice recognition software  Occasional wrong word or "sound a like" substitutions may have occurred due to the inherent limitations of voice recognition software  Read the chart carefully and recognize, using context, where substitutions have occurred

## 2021-01-19 NOTE — ASSESSMENT & PLAN NOTE
· Continue amlodipine 5mg daily  · Holding losartan in the setting of hyperkalemia  · Cardiology recommendations appreciated  · Now on metoprolol 25mg BID rather than home Carvedilol 12 5 BID in the setting of NSVTs  · Hydralizine PRN

## 2021-01-19 NOTE — OCCUPATIONAL THERAPY NOTE
Occupational Therapy Evaluation     Patient Name: Walker Parks  CQIKV'P Date: 1/19/2021  Problem List  Principal Problem:    COVID-19  Active Problems:    Essential hypertension    CLL (chronic lymphocytic leukemia) ([facility] )    Pulmonary hypertension ([facility] )    Hypercholesterolemia    Mild depression ([facility] )    Diverticulitis    BPH with obstruction/lower urinary tract symptoms    Alcohol abuse    Hyperglycemia    Acute respiratory failure with hypoxia (HCC)    QT prolongation    Sinus los-tachy syndrome (HCC)    Anemia    Acute cystitis without hematuria    Transaminitis    NSVT (nonsustained ventricular tachycardia) (HCC)    Past Medical History  Past Medical History:   Diagnosis Date    Anxiety     BPH (benign prostatic hypertrophy)     CLL [facility]ch[facility]) ([facility] )     2009    Colon polyp     Concussion     Resolved: 08/22/16    Depression     Gastrointestinal hemorrhage     Last assessed: 08/27/13    GERD (gastroesophageal reflux disease)     Hearing loss of aging     Hiatal hernia     History of transfusion     Hyperlipidemia     Hypertension     Iron deficiency anemia     Microscopic hematuria     Last assessed: 06/28/13    OA (osteoarthritis)     right hip    Pneumothorax     Prostatitis     Pulmonary hypertension ([facility] )     Seasonal allergies     UTI (urinary tract infection)     in past    Wears glasses      Past Surgical History  Past Surgical History:   Procedure Laterality Date    COLONOSCOPY      CYSTOSCOPY  10/09/2014    Diagnostic    CYSTOSCOPY  06/29/2020    FRACTURE SURGERY      left lower arm    FRACTURE SURGERY      left femur    HERNIA REPAIR      JOINT REPLACEMENT Right     hip    OTHER SURGICAL HISTORY  03/2011    Spinal anesthesia epidural    KS TOTAL HIP ARTHROPLASTY Right 9/29/2017    Procedure: ARTHROPLASTY HIP TOTAL ANTERIOR;  Surgeon: Pamela Rowell MD;  Location: [facility];  Service: Orthopedics    TONSILLECTOMY AND ADENOIDECTOMY      TRANSURETHRAL RESECTION OF PROSTATE      x 2    WRIST SURGERY          01/19/21 1524   OT Last Visit   OT Visit Date 01/19/21  (Tuesday)   Note Type   Note type Evaluation   Restrictions/Precautions   Weight Bearing Precautions Per Order No   Other Precautions Contact/isolation; Airborne/isolation;Droplet precautions;O2;Fall Risk;Bed Alarm;Multiple lines   Pain Assessment   Pain Assessment Tool 0-10   Pain Score No Pain   Home Living   Type of Home Apartment   Home Layout One level;Performs ADLs on one level; Able to live on main level with bedroom/bathroom   Bathroom Shower/Tub Walk-in shower   Bathroom Toilet Standard   Bathroom Equipment Grab bars in shower; Other (Comment)  (no DME  pt plans to get shower chair)   [facility]  (not using AD)   Additional Comments Pt reports living w/ wife in apt PTA   Prior Function   Level of Hope Independent with ADLs and functional mobility   Lives With Spouse; Other (Comment)  (pt reports wife is currently admitted on S 3 w/ COVID)   ADL Assistance Independent   IADLs Independent  (+ drive)   Falls in the last 6 months 0   Vocational Retired   Comments Pt reports I w/ ADL/ IADl PTA w/ out use of AD including driving  Pt reports no O2 at baseline   Lifestyle   Autonomy Pt reports I w/ ADL/ IADL PTA w/ out use of AD or DME  Pt drives   Reciprocal Relationships Pt reports living w/ wife, and reported that she is curently admitted w/ COVID   Service to Others Pt reports retired and worked for [employer]   Intrinsic Gratification Pt reports enjoying going to bars, but added that he know he shouldn't  Pt added that he would like to lose some weight  Also enjoys puzzle books and his grandson   Psychosocial   Needs Expressed Emotional   Length of Time/Family Visitation   (therapist provided emotional support, A w/ phone)   Subjective   Subjective "I am trying to get in touch w/ my wife's nurse   I am worried about her and she called me to tell me they put a heart monitor on her"   ADL   Where Assessed Edge of bed  (vs standing )   Eating Assistance 6  Modified independent   Eating Deficit Setup   Grooming Assistance 6  Modified Independent  (seated unsupported at EOB)   Grooming Deficit Setup;Standing with assistive device   UB Bathing Assistance Unable to assess  (anticipate S after set- up w/ + time based on obs skills)   LB Bathing Assistance Unable to assess  (anticipate min A based on fxal obs skills)   UB Dressing Assistance 6  Modified independent   UB Dressing Deficit Setup;Verbal cueing; Increased time to complete   LB Dressing Assistance 4  Minimal Assistance   LB Dressing Deficit Steadying;Setup   Toileting Assistance  Unable to assess   Additional Comments on O2 via NC   Bed Mobility   Supine to Sit 5  Supervision   Additional items Assist x 1; Increased time required;Verbal cues; Bedrails   Sit to Supine 5  Supervision   Additional items Assist x 1; Increased time required; Bedrails   Additional Comments Pt supine HOB elevated upon therapist arrival and post eval w/ needs met, call bell in reach and bed alarm activated   Transfers   Sit to Stand 5  Supervision   Additional items Assist x 1; Increased time required;Verbal cues  (instruction for hand placement)   Stand to Sit 5  Supervision   Additional items Assist x 1; Increased time required;Verbal cues  (instruction for hand placement)   Additional Comments Pt engaged in 30 second sit <> stand w/ score of 4  Scores < 12 for mend 65-69 indicates + fall risk  Pt scored below mean for his age indicating + fall risk   Functional Mobility   Functional Mobility 4  Minimal assistance   Additional Comments min A short distances w/ in room w/ out use of AD     Additional items   (no AD)   Balance   Static Sitting Good   Static Standing Fair -   Ambulatory Poor +   Activity Tolerance   Activity Tolerance Patient limited by fatigue   Medical Staff Made Aware spoke to PTLibia Aqq  192 per RN, April appropriate to see pt   RUE Assessment   RUE Assessment WFL   RUE Strength   RUE Overall Strength Other (Comment)  (generalized weakness, able to participate in ADL)   LUE Assessment   LUE Assessment WFL   LUE Strength   LUE Overall Strength Other (Comment)  (generalized weakness; able to participate in ADL)   Hand Function   Gross Motor Coordination Functional   Fine Motor Coordination Functional   Sensation   Light Touch Not tested   Sharp/Dull Not tested   Vision-Basic Assessment   Current Vision   (glasses)   Cognition   Overall Cognitive Status [facility]   Arousal/Participation Alert; Cooperative   Attention Attends with cues to redirect   Orientation Level Oriented X4   Memory Decreased recall of recent events; Other (Comment)  (details, timeframe)   Following Commands Follows multistep commands without difficulty   Comments Identified pt by full name and birthdate  Pt alert and oriented  Demonstrated limited recall details, timeframe events but oriented  Able to participate in conversation  Therapist provided emotional support and asissted pt w/ hospital phone  Pt is easily distracted but pleasant   Assessment   Limitation Decreased ADL status; Decreased endurance;Decreased high-level ADLs; Decreased self-care trans   Assessment Pt is a 69yo male [facility] 1/101[facility]20[facility]  Pt presents w/ COVID-19 and significant PMH impacting his occupational performance including CLL, hx concussion, anxiety/depression, HTN, R DANIEL, wrist sx  Pt tested + for COVID on 1/4/2021  Pt transferred from [facility]  Pt intubated on 1/12/2021 and extubated on 1/17/2021  Pt reports living w/ wife in apt PTA  Pt reports I w/ ADL/IADL w/ out O2 or AD  Upon eval, pt alert and generally oriented on 6L O2  Pt able to participate in conversation and follow directions w/ cues to sustain attention  Pt required S to complete bed mobility supine <> sit   Pt required S to complete sit <> stand and min A w/ out AD for short distance functional mobility  Pt required min A to complete LBD and mod I for + time, set- up to complete UBD  Pt engaged in 30 second sit to stand w/ score of 4 indicating + fall risk  Pt presents w/ decreased activity tolerance, decreased endurance, decreased standing tolerance, decreased standing balance, generalized weakness / deconditioning impacting his I w/ dressing, bathing, food prep / clean up, clothing mgmt, functional mobility, functional transfers, community mobility  Pt completing ADL below baseline level of I and would benefit from OT while in acute care to address deficits  Recommend discharge home to OF w/ social support pending progress, activity tolerance when medically stable for discharge from acte care  Will continue to follow   Goals   Patient Goals Pt stated that he would like to go home and be able to care for his wife   Plan   Treatment Interventions ADL retraining;Functional transfer training; Endurance training;Patient/family training;Equipment evaluation/education;Continued evaluation; Energy conservation; Activityengagement   Goal Expiration Date 01/29/21   OT Frequency 2-3x/wk   Recommendation   OT Discharge Recommendation Home with skilled therapy  (pending progress, activity tolerance)   Equipment Recommended Tub seat with back   Barthel Index   Feeding 10   Bathing 0   Grooming Score 5   Dressing Score 5   Bladder Score 10   Bowels Score 10   Toilet Use Score 5   Transfers (Bed/Chair) Score 10   Mobility (Level Surface) Score 0   Stairs Score 0   Barthel Index Score 55   Modified [place] Scale   Modified [place] Scale 4   Pt goals to be met by 1/29/2021:  -Pt will complete bed mobility supine <> sit w/ mod I to max I w/ ADL performance    -Pt will complete UBD w/ mod I to max I w/ ADL performance    -Pt will complete LBD w/ S after set- up to max I w/ ADL performance to return home    -Pt will consistently engage in functional mobility using AD as needed to /from bathroom w/ mod I to max I w/ ADL performance to return home    -Pt will demonstrate increased functional standing tolerance for at least 10 minutes w/ out rest break or sign /symptoms of fatigue while engaged in ADL tasks w/ at least fair balance to max I w/ functional mobility and IADL tasks    -Pt will demonstrate good attention and understanding of EC tech to max I and improve activity engagement    -Pt will demonstrate increased 30 second sit <> stand score to at least 8 to reduce fall risk and max I w/ ADL / IADL performance to return home    -Pt will complete functional transfers to all surfaces w/ mod I to max I w/ ADL performance using AD, DME as needed    Maricruz Gonzales, OTR/L

## 2021-01-20 PROBLEM — R00.1 BRADYCARDIA: Status: ACTIVE | Noted: 2021-01-20

## 2021-01-20 LAB
ALBUMIN SERPL BCP-MCNC: 3 G/DL (ref 3.5–5)
ALP SERPL-CCNC: 99 U/L (ref 46–116)
ALT SERPL W P-5'-P-CCNC: 98 U/L (ref 12–78)
ANION GAP SERPL CALCULATED.3IONS-SCNC: 7 MMOL/L (ref 4–13)
ANISOCYTOSIS BLD QL SMEAR: PRESENT
AST SERPL W P-5'-P-CCNC: 47 U/L (ref 5–45)
BASOPHILS # BLD MANUAL: 0 THOUSAND/UL (ref 0–0.1)
BASOPHILS NFR MAR MANUAL: 0 % (ref 0–1)
BILIRUB SERPL-MCNC: 1.44 MG/DL (ref 0.2–1)
BUN SERPL-MCNC: 17 MG/DL (ref 5–25)
CALCIUM ALBUM COR SERPL-MCNC: 9 MG/DL (ref 8.3–10.1)
CALCIUM SERPL-MCNC: 8.2 MG/DL (ref 8.3–10.1)
CHLORIDE SERPL-SCNC: 96 MMOL/L (ref 100–108)
CO2 SERPL-SCNC: 29 MMOL/L (ref 21–32)
CREAT SERPL-MCNC: 0.59 MG/DL (ref 0.6–1.3)
CRP SERPL QL: 6.1 MG/L
D DIMER PPP FEU-MCNC: 3.02 UG/ML FEU
EOSINOPHIL # BLD MANUAL: 0 THOUSAND/UL (ref 0–0.4)
EOSINOPHIL NFR BLD MANUAL: 0 % (ref 0–6)
ERYTHROCYTE [DISTWIDTH] IN BLOOD BY AUTOMATED COUNT: 20.7 % (ref 11.6–15.1)
FERRITIN SERPL-MCNC: 854 NG/ML (ref 8–388)
GFR SERPL CREATININE-BSD FRML MDRD: 104 ML/MIN/1.73SQ M
GLUCOSE SERPL-MCNC: 103 MG/DL (ref 65–140)
GLUCOSE SERPL-MCNC: 107 MG/DL (ref 65–140)
GLUCOSE SERPL-MCNC: 132 MG/DL (ref 65–140)
GLUCOSE SERPL-MCNC: 137 MG/DL (ref 65–140)
GLUCOSE SERPL-MCNC: 139 MG/DL (ref 65–140)
GLUCOSE SERPL-MCNC: 174 MG/DL (ref 65–140)
HCT VFR BLD AUTO: 31 % (ref 36.5–49.3)
HGB BLD-MCNC: 9.3 G/DL (ref 12–17)
LYMPHOCYTES # BLD AUTO: 143.3 THOUSAND/UL (ref 0.6–4.47)
LYMPHOCYTES # BLD AUTO: 94 % (ref 14–44)
MCH RBC QN AUTO: 30.6 PG (ref 26.8–34.3)
MCHC RBC AUTO-ENTMCNC: 30 G/DL (ref 31.4–37.4)
MCV RBC AUTO: 102 FL (ref 82–98)
MONOCYTES # BLD AUTO: 0 THOUSAND/UL (ref 0–1.22)
MONOCYTES NFR BLD: 0 % (ref 4–12)
NEUTROPHILS # BLD MANUAL: 9.15 THOUSAND/UL (ref 1.85–7.62)
NEUTS SEG NFR BLD AUTO: 6 % (ref 43–75)
NRBC BLD AUTO-RTO: 0 /100 WBCS
PLATELET # BLD AUTO: 293 THOUSANDS/UL (ref 149–390)
PLATELET BLD QL SMEAR: ADEQUATE
PMV BLD AUTO: 10.5 FL (ref 8.9–12.7)
POTASSIUM SERPL-SCNC: 5 MMOL/L (ref 3.5–5.3)
PROT SERPL-MCNC: 6.6 G/DL (ref 6.4–8.2)
RBC # BLD AUTO: 3.04 MILLION/UL (ref 3.88–5.62)
SODIUM SERPL-SCNC: 132 MMOL/L (ref 136–145)
TOTAL CELLS COUNTED SPEC: 100
WBC # BLD AUTO: 152.45 THOUSAND/UL (ref 4.31–10.16)

## 2021-01-20 PROCEDURE — 82948 REAGENT STRIP/BLOOD GLUCOSE: CPT

## 2021-01-20 PROCEDURE — 85007 BL SMEAR W/DIFF WBC COUNT: CPT | Performed by: INTERNAL MEDICINE

## 2021-01-20 PROCEDURE — 85379 FIBRIN DEGRADATION QUANT: CPT | Performed by: INTERNAL MEDICINE

## 2021-01-20 PROCEDURE — 99232 SBSQ HOSP IP/OBS MODERATE 35: CPT | Performed by: INTERNAL MEDICINE

## 2021-01-20 PROCEDURE — 97116 GAIT TRAINING THERAPY: CPT

## 2021-01-20 PROCEDURE — 97110 THERAPEUTIC EXERCISES: CPT

## 2021-01-20 PROCEDURE — 80053 COMPREHEN METABOLIC PANEL: CPT | Performed by: INTERNAL MEDICINE

## 2021-01-20 PROCEDURE — 85027 COMPLETE CBC AUTOMATED: CPT | Performed by: INTERNAL MEDICINE

## 2021-01-20 PROCEDURE — 82728 ASSAY OF FERRITIN: CPT | Performed by: INTERNAL MEDICINE

## 2021-01-20 PROCEDURE — 86140 C-REACTIVE PROTEIN: CPT | Performed by: INTERNAL MEDICINE

## 2021-01-20 RX ORDER — BISACODYL 10 MG
10 SUPPOSITORY, RECTAL RECTAL DAILY PRN
Status: DISCONTINUED | OUTPATIENT
Start: 2021-01-20 | End: 2021-01-21 | Stop reason: HOSPADM

## 2021-01-20 RX ADMIN — LORAZEPAM 0.5 MG: 0.5 TABLET ORAL at 21:25

## 2021-01-20 RX ADMIN — MONTELUKAST SODIUM 10 MG: 10 TABLET, FILM COATED ORAL at 08:31

## 2021-01-20 RX ADMIN — CHLORHEXIDINE GLUCONATE 0.12% ORAL RINSE 15 ML: 1.2 LIQUID ORAL at 08:30

## 2021-01-20 RX ADMIN — FOLIC ACID 1 MG: 1 TABLET ORAL at 08:31

## 2021-01-20 RX ADMIN — TAMSULOSIN HYDROCHLORIDE 0.4 MG: 0.4 CAPSULE ORAL at 17:20

## 2021-01-20 RX ADMIN — POLYETHYLENE GLYCOL 3350 17 G: 17 POWDER, FOR SOLUTION ORAL at 08:32

## 2021-01-20 RX ADMIN — Medication 1 TABLET: at 08:32

## 2021-01-20 RX ADMIN — THIAMINE HCL TAB 100 MG 100 MG: 100 TAB at 08:33

## 2021-01-20 RX ADMIN — AMLODIPINE BESYLATE 5 MG: 5 TABLET ORAL at 21:14

## 2021-01-20 RX ADMIN — Medication 2000 UNITS: at 08:30

## 2021-01-20 RX ADMIN — ENOXAPARIN SODIUM 80 MG: 80 INJECTION SUBCUTANEOUS at 08:30

## 2021-01-20 RX ADMIN — ENOXAPARIN SODIUM 80 MG: 80 INJECTION SUBCUTANEOUS at 21:13

## 2021-01-20 RX ADMIN — SERTRALINE HYDROCHLORIDE 50 MG: 50 TABLET ORAL at 08:32

## 2021-01-20 RX ADMIN — ASPIRIN 81 MG: 81 TABLET ORAL at 08:30

## 2021-01-20 RX ADMIN — DOCUSATE SODIUM AND SENNOSIDES 1 TABLET: 8.6; 5 TABLET ORAL at 21:14

## 2021-01-20 RX ADMIN — MELATONIN 6 MG: at 21:13

## 2021-01-20 RX ADMIN — PREDNISONE 40 MG: 20 TABLET ORAL at 08:32

## 2021-01-20 RX ADMIN — METOPROLOL TARTRATE 25 MG: 25 TABLET, FILM COATED ORAL at 21:14

## 2021-01-20 RX ADMIN — AMLODIPINE BESYLATE 5 MG: 5 TABLET ORAL at 08:29

## 2021-01-20 RX ADMIN — PANTOPRAZOLE SODIUM 40 MG: 40 TABLET, DELAYED RELEASE ORAL at 05:04

## 2021-01-20 RX ADMIN — CHLORHEXIDINE GLUCONATE 0.12% ORAL RINSE 15 ML: 1.2 LIQUID ORAL at 21:14

## 2021-01-20 RX ADMIN — ATORVASTATIN CALCIUM 40 MG: 40 TABLET, FILM COATED ORAL at 21:13

## 2021-01-20 NOTE — PHYSICAL THERAPY NOTE
PHYSICAL THERAPY NOTE    Patient Name: Noam Carter  KDLWO'N Date: 21 1342   PT Last Visit   PT Visit Date 21   Note Type   Note Type Treatment   Pain Assessment   Pain Assessment Tool Pain Assessment not indicated - pt denies pain   Pain Score No Pain   Restrictions/Precautions   Weight Bearing Precautions Per Order No   Other Precautions Contact/isolation; Airborne/isolation;Droplet precautions;O2;Fall Risk;Bed Alarm;Multiple lines   General   Family/Caregiver Present No   Subjective   Subjective Patient seated OOB in recliner and is agreeable to participate in therapy session  Patient identifers obtained from name &   Bed Mobility   Supine to Sit Unable to assess   Sit to Supine Unable to assess   Additional Comments Patient seated OOB in recliner pre and post session with call bell and belongings in reach  Transfers   Sit to Stand 5  Supervision   Additional items Assist x 1; Armrests   Stand to Sit 5  Supervision   Additional items Assist x 1; Armrests   Toilet transfer 5  Supervision   Additional items Assist x 1; Armrests; Increased time required;Verbal cues;Standard toilet;Commode  (x 2 trials (one commode & one standard))   Ambulation/Elevation   Gait pattern Short stride;Decreased foot clearance   Gait Assistance 5  Supervision   Additional items Assist x 1;Verbal cues   Assistive Device Rolling walker   Distance 60' x1, 30' x1   Balance   Static Sitting Good   Dynamic Sitting Fair +   Static Standing Fair -   Ambulatory Fair -   Endurance Deficit   Endurance Deficit Yes   Endurance Deficit Description 6L O2 via NC, O2 sat 89-94% throughout session   Activity Tolerance   Activity Tolerance Patient tolerated treatment well   Nurse Made Aware Spoke to Braden Galo RN    Exercises   Hip Abduction Sitting;20 reps;AROM; Bilateral  (with SLR)   Hip Adduction Sitting;20 reps;AROM; Bilateral  (with SLR) Knee AROM Long Arc Quad Sitting;20 reps;AROM; Bilateral   Marching Sitting;20 reps;AROM; Bilateral   Assessment   Prognosis Good   Problem List Decreased strength;Decreased endurance; Impaired balance;Decreased mobility   Assessment Patient agreeable and motivated to participate in therapy session  Patient demonstrates ability to transfer sit<>stand with consistent supervision throughout session with good safety and occasional instruction for hand placement  Pt able to ambulate increased gait distance with roller walker and supervision with assistance for O2 line management  Participated in seated B LE exercise program with AROM and instruction to slow pace  Continue to focus on OOB mobility with progression of ambulation and initiation of stair training as appropriate  Goals   Patient Goals "to see my wife"   STG Expiration Date 02/02/21   PT Treatment Day 1   Plan   Treatment/Interventions Functional transfer training;LE strengthening/ROM; Elevations; Therapeutic exercise; Endurance training;Patient/family training;Equipment eval/education; Bed mobility;Gait training;Spoke to nursing   Progress Improving as expected   PT Frequency 5x/wk   Recommendation   PT Discharge Recommendation Return to previous environment with social support;Home with skilled therapy; Other (Comment)  (home PT)   Equipment Recommended Briseyda Barlow, PTA

## 2021-01-20 NOTE — PROGRESS NOTES
Progress Note - Maribell Retana 1950, 78 y o  male MRN: [id number]    Unit/Bed#: S -01 Encounter: [id number]    Primary Care Provider: Beckie Goel MD   Date and time admitted to hospital: 1/10/2021 12:16 PM        Bradycardia  Assessment & Plan  Cardiology did see the patient  They feel bradycardia is sedondary to propofol infusion  Stable to be DC on metoprolol    Acute respiratory failure with hypoxia (HCC)  Assessment & Plan  Secondary to covid 19   Weaning off O2   Will get PT /OT as well  May be stable for DC in 1-2 days  CLL (chronic lymphocytic leukemia) [facility])  Assessment & Plan  Outpatient follow up with hematology oncology  Essential hypertension  Assessment & Plan          VTE Pharmacologic Prophylaxis:   Pharmacologic: Heparin  Mechanical VTE Prophylaxis in Place: Yes    Patient Centered Rounds: touched base with corbin  Discussions with Specialists or Other Care Team Provider: Discussed with CM  Education and Discussions with Family / Patient: Discussed with patient  Time Spent for Care: 30 minutes  More than 50% of total time spent on counseling and coordination of care as described above  Current Length of Stay: 10 day(s)    Current Patient Status: Inpatient   Certification Statement: The patient will continue to require additional inpatient hospital stay due to covid 19 wean off O2  Discharge Plan: Not medically stable for dC  Code Status: Level 1 - Full Code      Subjective:   Patient seen and examined  Feeling "good"    Objective:     Vitals:   Temp (24hrs), Av 2 °F (36 8 °C), Min:97 9 °F (36 6 °C), Max:98 6 °F (37 °C)    Temp:  [97 9 °F (36 6 °C)-98 6 °F (37 °C)] 97 9 °F (36 6 °C)  HR:  [50-72] 72  Resp:  [16-18] 18  BP: (124-139)/(61-64) 124/64  SpO2:  [85 %-99 %] 95 %  Body mass index is 26 37 kg/m²  Input and Output Summary (last 24 hours):        Intake/Output Summary (Last 24 hours) at 2021 4734  Last data filed at 1/20/2021 1230  Gross per 24 hour   Intake 720 ml   Output 1300 ml   Net -580 ml       Physical Exam:     Physical Exam  Constitutional:       General: He is not in acute distress  Appearance: He is not ill-appearing, toxic-appearing or diaphoretic  HENT:      Head: Normocephalic  Cardiovascular:      Rate and Rhythm: Normal rate  Pulmonary:      Effort: Pulmonary effort is normal  No respiratory distress  Breath sounds: No stridor  No wheezing, rhonchi or rales  Chest:      Chest wall: No tenderness  Abdominal:      General: Abdomen is flat  There is no distension  Palpations: There is no mass  Tenderness: There is no abdominal tenderness  There is no right CVA tenderness, guarding or rebound  Hernia: No hernia is present  Musculoskeletal:         General: No swelling, tenderness, deformity or signs of injury  Left lower leg: No edema  Skin:     Capillary Refill: Capillary refill takes less than 2 seconds  Coloration: Skin is pale  Skin is not jaundiced  Findings: No bruising, erythema or rash  Neurological:      General: No focal deficit present  Mental Status: He is alert  Cranial Nerves: No cranial nerve deficit  Sensory: No sensory deficit  Motor: No weakness  Coordination: Coordination normal       Gait: Gait normal       Deep Tendon Reflexes: Reflexes normal    Psychiatric:         Mood and Affect: Mood normal            Additional Data:     Labs:    Results from last 7 days   Lab Units 01/20/21 0448  01/16/21  0358   WBC Thousand/uL 152 45*   < > 125 45*   HEMOGLOBIN g/dL 9 3*   < > 8 8*   HEMATOCRIT % 31 0*   < > 30 2*   PLATELETS Thousands/uL 293   < > 384   NEUTROS PCT %  --   --  7*   LYMPHS PCT %  --   --  92*   LYMPHO PCT % 94*   < >  --    MONOS PCT %  --   --  0*   MONO PCT % 0*   < >  --    EOS PCT % 0   < > 0    < > = values in this interval not displayed       Results from last 7 days   Lab Units 01/20/21  0447 SODIUM mmol/L 132*   POTASSIUM mmol/L 5 0   CHLORIDE mmol/L 96*   CO2 mmol/L 29   BUN mg/dL 17   CREATININE mg/dL 0 59*   ANION GAP mmol/L 7   CALCIUM mg/dL 8 2*   ALBUMIN g/dL 3 0*   TOTAL BILIRUBIN mg/dL 1 44*   ALK PHOS U/L 99   ALT U/L 98*   AST U/L 47*   GLUCOSE RANDOM mg/dL 107     Results from last 7 days   Lab Units 01/13/21  2340   INR  1 61*     Results from last 7 days   Lab Units 01/20/21  1611 01/20/21  1040 01/20/21  0732 01/20/21  0456 01/19/21  2048 01/19/21  1232 01/19/21  0630 01/18/21  2347 01/18/21  1212 01/18/21  0023 01/17/21  2305 01/17/21  1807   POC GLUCOSE mg/dl 137 139 103 132 116 145* 95 136 148* 117 88 94     Results from last 7 days   Lab Units 01/16/21  0358   HEMOGLOBIN A1C % 5 6     Results from last 7 days   Lab Units 01/14/21  0558   PROCALCITONIN ng/ml 0 06           * I Have Reviewed All Lab Data Listed Above  * Additional Pertinent Lab Tests Reviewed: All Labs For Current Hospital Admission Reviewed    Imaging:    Imaging Reports Reviewed Today Include:   None pertinent to this encounter     Imaging Personally Reviewed by Myself Includes:  As above    Recent Cultures (last 7 days):           Last 24 Hours Medication List:   Current Facility-Administered Medications   Medication Dose Route Frequency Provider Last Rate    acetaminophen  650 mg Oral Q6H PRN Fadia Maxwell MD      albuterol  2 puff Inhalation Q4H PRN Fadia Maxwell MD      amLODIPine  5 mg Oral BID Fadia Maxwell MD      aspirin  81 mg Oral Daily Fadia Maxwell MD      atorvastatin  40 mg Per NG Tube HS Fadia Maxwell MD      bisacodyl  10 mg Rectal Daily PRN Yu Gamez MD      calcium carbonate  500 mg Oral Daily PRN Deepali Chandra PA-C      chlorhexidine  15 mL Swish & Spit Q12H [facility] Fadia Maxwell MD      cholecalciferol  2,000 Units Per NG Tube Daily Fadia Maxwell MD      dextromethorphan-guaiFENesin  10 mL Oral Q4H PRN Jettie Stage, MD      enoxaparin  1 mg/kg Subcutaneous Q12H [address] Butch Boast, MD      fluticasone  2 spray Nasal Daily PRN Jettie Stage, MD      folic acid  1 mg Oral Daily Jettie Stage, MD      glycerin-hypromellose-  1 drop Both Eyes Q3H PRN Jettie Stage, MD      hydrALAZINE  20 mg Intravenous Q6H PRN Jettie Stage, MD      insulin lispro  1-5 Units Subcutaneous Q6H [address] A MD Lázaro      LORazepam  0 5 mg Oral Q8H PRN Jettie Stage, MD      melatonin  6 mg Oral HS Jettie Stage, MD      metoprolol tartrate  25 mg Oral Q12H [address] A MD Lázaro      montelukast  10 mg Oral Daily Jettie Stage, MD      multivitamin-minerals  1 tablet Oral Daily Jettie Stage, MD      pantoprazole  40 mg Oral Early Morning Jettie Stage, MD      polyethylene glycol  17 g Oral Daily Jettie Stage, MD      predniSONE  40 mg Oral Daily Tae Feeling A MD Lázaro      Followed by   Shanita Iraheta ON 1/23/2021] predniSONE  30 mg Oral Daily Tae Feeling TIFFANIE Sesay MD      Followed by   Shanita Irhaeta ON 1/26/2021] predniSONE  20 mg Oral Daily Tae Feeling A MD Lázaro      Followed by   Shanita Iraheta ON 1/29/2021] predniSONE  10 mg Oral Daily Jettie Stage, MD      senna-docusate sodium  1 tablet Oral HS Jettie Stage, MD      sertraline  50 mg Oral Daily Jettie Stage, MD      tamsulosin  0 4 mg Oral Daily With Daniel Adrian MD      thiamine  100 mg Per NG Tube Daily Cecilia Stage, MD          Today, Patient Was Seen By: Rosana Moffett MD    ** Please Note: Dictation voice to text software may have been used in the creation of this document   **

## 2021-01-20 NOTE — PLAN OF CARE
Problem: Potential for Falls  Goal: Patient will remain free of falls  Description: INTERVENTIONS:  - Assess patient frequently for physical needs  -  Identify cognitive and physical deficits and behaviors that affect risk of falls  -  [place] fall precautions as indicated by assessment   - Educate patient/family on patient safety including physical limitations  - Instruct patient to call for assistance with activity based on assessment  - Modify environment to reduce risk of injury  - Consider OT/PT consult to assist with strengthening/mobility  Outcome: Progressing     Problem: Nutrition/Hydration-ADULT  Goal: Nutrient/Hydration intake appropriate for improving, restoring or maintaining nutritional needs  Description: Monitor and assess patient's nutrition/hydration status for malnutrition  Collaborate with interdisciplinary team and initiate plan and interventions as ordered  Monitor patient's weight and dietary intake as ordered or per policy  Utilize nutrition screening tool and intervene as necessary  Determine patient's food preferences and provide high-protein, high-caloric foods as appropriate       INTERVENTIONS:  - Monitor oral intake, urinary output, labs, and treatment plans  - Assess nutrition and hydration status and recommend course of action  - Evaluate amount of meals eaten  - Assist patient with eating if necessary   - Allow adequate time for meals  - Recommend/ encourage appropriate diets, oral nutritional supplements, and vitamin/mineral supplements  - Order, calculate, and assess calorie counts as needed  - Recommend, monitor, and adjust tube feedings and TPN/PPN based on assessed needs  - Assess need for intravenous fluids  - Provide specific nutrition/hydration education as appropriate  - Include patient/family/caregiver in decisions related to nutrition  Outcome: Progressing     Problem: Prexisting or High Potential for Compromised Skin Integrity  Goal: Skin integrity is maintained or improved  Description: INTERVENTIONS:  - Identify patients at risk for skin breakdown  - Assess and monitor skin integrity  - Assess and monitor nutrition and hydration status  - Monitor labs   - Assess for incontinence   - Turn and reposition patient  - Assist with mobility/ambulation  - Relieve pressure over bony prominences  - Avoid friction and shearing  - Provide appropriate hygiene as needed including keeping skin clean and dry  - Evaluate need for skin moisturizer/barrier cream  - Collaborate with interdisciplinary team   - Patient/family teaching  - Consider wound care consult   Outcome: Progressing     Problem: SAFETY,RESTRAINT: NV/NON-SELF DESTRUCTIVE BEHAVIOR  Goal: Remains free of harm/injury (restraint for non violent/non self-detsructive behavior)  Description: INTERVENTIONS:  - Instruct patient/family regarding restraint use   - Assess and monitor physiologic and psychological status   - Provide interventions and comfort measures to meet assessed patient needs   - Identify and implement measures to help patient regain control  - Assess readiness for release of restraint   Outcome: Progressing  Goal: Returns to optimal restraint-free functioning  Description: INTERVENTIONS:  - Assess the patient's behavior and symptoms that indicate continued need for restraint  - Identify and implement measures to help patient regain control  - Assess readiness for release of restraint   Outcome: Progressing

## 2021-01-20 NOTE — PLAN OF CARE
Problem: Potential for Falls  Goal: Patient will remain free of falls  Description: INTERVENTIONS:  - Assess patient frequently for physical needs  -  Identify cognitive and physical deficits and behaviors that affect risk of falls  -  [place] fall precautions as indicated by assessment   - Educate patient/family on patient safety including physical limitations  - Instruct patient to call for assistance with activity based on assessment  - Modify environment to reduce risk of injury  - Consider OT/PT consult to assist with strengthening/mobility  Outcome: Progressing     Problem: Nutrition/Hydration-ADULT  Goal: Nutrient/Hydration intake appropriate for improving, restoring or maintaining nutritional needs  Description: Monitor and assess patient's nutrition/hydration status for malnutrition  Collaborate with interdisciplinary team and initiate plan and interventions as ordered  Monitor patient's weight and dietary intake as ordered or per policy  Utilize nutrition screening tool and intervene as necessary  Determine patient's food preferences and provide high-protein, high-caloric foods as appropriate       INTERVENTIONS:  - Monitor oral intake, urinary output, labs, and treatment plans  - Assess nutrition and hydration status and recommend course of action  - Evaluate amount of meals eaten  - Assist patient with eating if necessary   - Allow adequate time for meals  - Recommend/ encourage appropriate diets, oral nutritional supplements, and vitamin/mineral supplements  - Order, calculate, and assess calorie counts as needed  - Recommend, monitor, and adjust tube feedings and TPN/PPN based on assessed needs  - Assess need for intravenous fluids  - Provide specific nutrition/hydration education as appropriate  - Include patient/family/caregiver in decisions related to nutrition  Outcome: Progressing     Problem: Prexisting or High Potential for Compromised Skin Integrity  Goal: Skin integrity is maintained or improved  Description: INTERVENTIONS:  - Identify patients at risk for skin breakdown  - Assess and monitor skin integrity  - Assess and monitor nutrition and hydration status  - Monitor labs   - Assess for incontinence   - Turn and reposition patient  - Assist with mobility/ambulation  - Relieve pressure over bony prominences  - Avoid friction and shearing  - Provide appropriate hygiene as needed including keeping skin clean and dry  - Evaluate need for skin moisturizer/barrier cream  - Collaborate with interdisciplinary team   - Patient/family teaching  - Consider wound care consult   Outcome: Progressing     Problem: SAFETY,RESTRAINT: NV/NON-SELF DESTRUCTIVE BEHAVIOR  Goal: Remains free of harm/injury (restraint for non violent/non self-detsructive behavior)  Description: INTERVENTIONS:  - Instruct patient/family regarding restraint use   - Assess and monitor physiologic and psychological status   - Provide interventions and comfort measures to meet assessed patient needs   - Identify and implement measures to help patient regain control  - Assess readiness for release of restraint   Outcome: Progressing  Goal: Returns to optimal restraint-free functioning  Description: INTERVENTIONS:  - Assess the patient's behavior and symptoms that indicate continued need for restraint  - Identify and implement measures to help patient regain control  - Assess readiness for release of restraint   Outcome: Progressing

## 2021-01-20 NOTE — ASSESSMENT & PLAN NOTE
Cardiology did see the patient  They feel bradycardia is sedondary to propofol infusion     Stable to be DC on metoprolol

## 2021-01-20 NOTE — PLAN OF CARE
Problem: PHYSICAL THERAPY ADULT  Goal: Performs mobility at highest level of function for planned discharge setting  See evaluation for individualized goals  Description: Treatment/Interventions: Functional transfer training, LE strengthening/ROM, Elevations, Therapeutic exercise, Endurance training, Patient/family training, Equipment eval/education, Bed mobility, Gait training          See flowsheet documentation for full assessment, interventions and recommendations  Outcome: Progressing  Note: Prognosis: Good  Problem List: Decreased strength, Decreased endurance, Impaired balance, Decreased mobility  Assessment: Patient agreeable and motivated to participate in therapy session  Patient demonstrates ability to transfer sit<>stand with consistent supervision throughout session with good safety and occasional instruction for hand placement  Pt able to ambulate increased gait distance with roller walker and supervision with assistance for O2 line management  Participated in seated B LE exercise program with AROM and instruction to slow pace  Continue to focus on OOB mobility with progression of ambulation and initiation of stair training as appropriate  PT Discharge Recommendation: Return to previous environment with social support, Home with skilled therapy, Other (Comment)(home PT)     PT - OK to Discharge: No(IMPROVE AMBULATORY DISTANCE)    See flowsheet documentation for full assessment

## 2021-01-20 NOTE — PROGRESS NOTES
Cardiology Progress Note - Xin Morales 78 y o  male MRN: [id number]    Unit/Bed#: S -01 Encounter: [id number]    Assessment and plan  1  Sinus bradycardia resolved  2  Nonsustained ventricular tachycardia  3  COVID pneumonia  4  QT prolongation  5  Acute hypoxemic respiratory failure secondary to 3    6  Obstructive sleep apnea  7  CLL    Recommendations: The bradycardia has resolved likely secondary due to propofol infusion  He has been moved to a med surge floor and is no longer on telemetry  Continue current dose of metoprolol keep electrolytes corrected  Patient can follow up in the office in 3-4 weeks  Avoid any QT prolonging agents  Please call if any questions    Subjective:    No significant events overnight  Transferred out of the ICU on med surge floor not on telemetry more  No significant events  Review of Systems   Skin: Dry skin:  covidex  Objective:   Vitals: Blood pressure 129/62, pulse (!) 50, temperature 98 °F (36 7 °C), temperature source Oral, resp  rate 18, height 5' 6" (1 676 m), weight 74 1 kg (163 lb 5 8 oz), SpO2 98 %  , Body mass index is 26 37 kg/m² ,   Orthostatic Blood Pressures      Most Recent Value   Blood Pressure  129/62 filed at 01/20/2021 0700   Patient Position - Orthostatic VS  Lying filed at 01/20/2021 6957         Systolic (92RAW), SHN:120 , Min:129 , LPW:239     Diastolic (35AIO), MEQ:94, Min:61, Max:84      Intake/Output Summary (Last 24 hours) at 1/20/2021 0913  Last data filed at 1/20/2021 0801  Gross per 24 hour   Intake 120 ml   Output 1300 ml   Net -1180 ml     Weight (last 2 days)     Date/Time   Weight    01/20/21 0600   74 1 (163 36)    01/19/21 0559   77 2 (170 2)    01/18/21 0542   88 8 (195 77)                Telemetry Review: No significant arrhythmias seen on telemetry review  EKG personally reviewed by Allison Zimmer DO       Physical Exam  Physical Exam:  Vital signs reviewed  General:  Alert and cooperative, appears stated age, no acute distress  HEENT:  No scleral icterus, no conjunctival pallor  Neck:  Normal range of motion  Lungs:  No respiratory distress, breathing unlabored  Abdomen:  No evidence of distension   Extremities:  Normal range of motion    No clubbing, cyanosis or edema   Skin:  No rashes or lesions on exposed skin  Neurologic:  Cranial nerves II-XII grossly intact without focal deficits  Psych:  Normal mood and affect            Laboratory Results:  Results from last 7 days   Lab Units 01/14/21  0559 01/14/21  0254 01/13/21  2340   TROPONIN I ng/mL 0 22* 0 28* 0 34*       CBC with diff:   Results from last 7 days   Lab Units 01/20/21  0448 01/19/21  0507 01/18/21  0600 01/17/21  0403 01/16/21  0358 01/15/21  0324 01/14/21  1628 01/14/21  0558   WBC Thousand/uL 152 45* 166 27* 164 90* 158 52* 125 45* 86 46*  --  74 16*   HEMOGLOBIN g/dL 9 3* 9 8* 9 9* 9 8* 8 8* 7 2* 7 2* 7 2*   HEMATOCRIT % 31 0* 33 4* 33 9* 32 5* 30 2* 23 7* 23 6* 22 8*   MCV fL 102* 102* 102* 101* 102* 101*  --  99*   PLATELETS Thousands/uL 293 319 364 436* 384 276  --  266   MCH pg 30 6 29 9 29 7 30 3 29 7 30 6  --  31 3   MCHC g/dL 30 0* 29 3* 29 2* 30 2* 29 1* 30 4*  --  31 6   RDW % 20 7* 20 9* 20 9* 20 4* 20 2* 19 5*  --  18 4*   MPV fL 10 5 9 7 10 0 9 9 9 8 10 0  --  9 7   NRBC AUTO /100 WBCs 0 0  --  0 0 0  --  0         CMP:  Results from last 7 days   Lab Units 01/20/21  0447 01/19/21  0507 01/18/21  0859 01/17/21  0403 01/16/21  1127 01/16/21  0358 01/15/21  0324  01/14/21  0558  01/13/21  1121   POTASSIUM mmol/L 5 0 5 2 5 3 5 0 5 3 5 6* 4 7   < > 4 9   < > 4 8   CHLORIDE mmol/L 96* 97* 97* 101 102 104 105   < > 102   < > 101   CO2 mmol/L 29 31 34* 32 30 29 26   < > 24   < > 26   BUN mg/dL 17 23 26* 19 19 19 18   < > 21   < > 20   CREATININE mg/dL 0 59* 0 71 0 66 0 57* 0 58* 0 74 0 65   < > 0 61   < > 0 77   CALCIUM mg/dL 8 2* 8 3 8 7 8 2* 8 1* 7 9* 7 9*   < > 7 8*   < > 8 0*   AST U/L 47*  --  46*  --   --  34 36  --  67*  --  85*   ALT U/L 98*  --  83*  --   --  77 94*  --  99*  --  134*   ALK PHOS U/L 99  --  133*  --   --  127* 116  --  115  --  137*   EGFR ml/min/1 73sq m 104 96 99 106 105 95 100   < > 103   < > 93    < > = values in this interval not displayed  BMP:  Results from last 7 days   Lab Units 21  0447 21  0507 21  0859 21  0403 21  1127 21  0358 01/15/21  0324   POTASSIUM mmol/L 5 0 5 2 5 3 5 0 5 3 5 6* 4 7   CHLORIDE mmol/L 96* 97* 97* 101 102 104 105   CO2 mmol/L 29 31 34* 32 30 29 26   BUN mg/dL 17 23 26* 19 19 19 18   CREATININE mg/dL 0 59* 0 71 0 66 0 57* 0 58* 0 74 0 65   CALCIUM mg/dL 8 2* 8 3 8 7 8 2* 8 1* 7 9* 7 9*       BNP: No results for input(s): BNP in the last 72 hours  Magnesium:   Results from last 7 days   Lab Units 21  0507 21  0403 21  0358 01/15/21  0324 21  0558   MAGNESIUM mg/dL 2 3 2 1 2 2 2 5 2 5       Coags:   Results from last 7 days   Lab Units 21  2340   INR  1 61*       TSH:        Hemoglobin A1C   Results from last 7 days   Lab Units 21  0358   HEMOGLOBIN A1C % 5 6       Lipid Profile:   Results from last 7 days   Lab Units 21  0358   TRIGLYCERIDES mg/dL 117       Cardiac testing:   Results for orders placed during the hospital encounter of 01/10/21   Echo complete with contrast if indicated    Narrative [facility], [address] [facility]  [phone]    Transthoracic Echocardiogram  2D, M-mode, Doppler, and Color Doppler    Study date:  15-Som-2021    Patient: Kendra Alcaraz  MR number: [id number]  Account number: [de-identified]  : 1950  Age: 78 years  Gender: Male  Status: Inpatient  Location: ICU  Height: 66 in  Weight: 195 lb  BP: 149/ 71 mmHg    Indications: New V-Tach, COVID-19      Diagnoses: I47 2 - Ventricular tachycardia    Sonographer:  HARJINDER Pablo  Primary Physician:  Nevin Michelle MD  Referring Physician:  Kelsie Paul MD  Group:  [facility] [id number] [facility]  Interpreting Physician:  Aylin Nuñez DO    SUMMARY    SUMMARY:  Limited study  LEFT VENTRICLE:  Systolic function was normal  Ejection fraction was estimated to be 60 %  Although no diagnostic regional wall motion abnormality was identified, this possibility cannot be completely excluded on the basis of this study  RIGHT VENTRICLE:  The ventricle was mildly to moderately dilated  Systolic function was mildly reduced  Free wall hypokinesis  LEFT ATRIUM:  The atrium was dilated  MITRAL VALVE:  There was mild regurgitation  AORTIC VALVE:  There was mild to moderate regurgitation  TRICUSPID VALVE:  There was moderate regurgitation  Estimated peak PA pressure was 75 mmHg  IVC, HEPATIC VEINS:  The inferior vena cava was dilated, with poor inspiratory collapse, consistent with elevated right atrial pressure  HISTORY: PRIOR HISTORY: COVID-19 +, hypertension, PHTN, Luekemia, Alcohol Abuse, Former Smoker  PROCEDURE: The study was performed in the ICU  This was a routine study  The transthoracic approach was used  The study included complete 2D imaging, M-mode, complete spectral Doppler, and color Doppler  The heart rate was 47 bpm, at the  start of the study  Images were obtained from the parasternal, apical, subcostal, and suprasternal notch acoustic windows  Image quality was adequate  LEFT VENTRICLE: Size was normal  Systolic function was normal  Ejection fraction was estimated to be 60 %  Although no diagnostic regional wall motion abnormality was identified, this possibility cannot be completely excluded on the basis  of this study  RIGHT VENTRICLE: The ventricle was mildly to moderately dilated  Systolic function was mildly reduced  Free wall hypokinesis  Wall thickness was normal     LEFT ATRIUM: The atrium was dilated  RIGHT ATRIUM: Size was normal     MITRAL VALVE: Valve structure was normal  There was normal leaflet separation   DOPPLER: The transmitral velocity was within the normal range  There was no evidence for stenosis  There was mild regurgitation  AORTIC VALVE: The valve was trileaflet  Leaflets exhibited normal thickness and normal cuspal separation  DOPPLER: Transaortic velocity was within the normal range  There was no evidence for stenosis  There was mild to moderate  regurgitation  TRICUSPID VALVE: The valve structure was normal  There was normal leaflet separation  DOPPLER: The transtricuspid velocity was within the normal range  There was no evidence for stenosis  There was moderate regurgitation  Estimated peak PA  pressure was 75 mmHg  PULMONIC VALVE: Not well visualized  PERICARDIUM: There was no pericardial effusion  The pericardium was normal in appearance  AORTA: The root exhibited normal size  SYSTEMIC VEINS: IVC: The inferior vena cava was dilated, with poor inspiratory collapse, consistent with elevated right atrial pressure  SYSTEM MEASUREMENT TABLES    2D  %FS: 33 33 %  Ao Diam: 3 62 cm  EDV(Teich): 99 88 ml  EF(Teich): 62 02 %  ESV(Teich): 37 93 ml  IVSd: 1 15 cm  LA Area: 26 46 cm2  LA Diam: 4 59 cm  LVEDV MOD A4C: 166 8 ml  LVEF MOD A4C: 60 98 %  LVESV MOD A4C: 65 09 ml  LVIDd: 4 65 cm  LVIDs: 3 1 cm  LVLd A4C: 9 42 cm  LVLs A4C: 7 89 cm  LVPWd: 1 03 cm  RA Area: 16 54 cm2  RVIDd: 5 01 cm  SV MOD A4C: 101 71 ml  SV(Teich): 61 94 ml    CW  AR Dec [place]: 3 09 m/s2  AR Dec Time: 1445 59 ms  AR PHT: 419 22 ms  AR Vmax: 4 47 m/s  AR maxP 75 mmHg  AV Env  Ti: 304 47 ms  AV MaxP 7 mmHg  AV VTI: 33 72 cm  AV Vmax: 1 78 m/s  AV Vmean: 1 11 m/s  AV meanP 58 mmHg  MR VTI: 204 77 cm  MR Vmax: 4 56 m/s  MR Vmean: 3 84 m/s  MR maxP 27 mmHg  MR meanP 52 mmHg  TR MaxP 73 mmHg  TR Vmax: 3 96 m/s    PW  LVOT Env  Ti: 374 88 ms  LVOT VTI: 24 53 cm  LVOT Vmax: 1 04 m/s  LVOT Vmean: 0 65 m/s  LVOT maxP 42 mmHg  LVOT meanP 05 mmHg  MV A Adithya: 0 16 m/s  MV Dec [place]: 2 45 m/s2  MV DecT: 254 72 ms  MV E Adithya: 0 62 m/s  MV E/A Ratio: 3 99  MV PHT: 73 87 ms  MVA By PHT: 2 98 cm2    [address] [facility]    Prepared and electronically signed by    Osiel Alcaraz DO  Signed 15-Som-2021 12:34:36       No results found for this or any previous visit  No results found for this or any previous visit  No results found for this or any previous visit  Meds/Allergies   all current active meds have been reviewed  Medications Prior to Admission   Medication    tamsulosin (FLOMAX) 0 4 mg    zolpidem (AMBIEN) 10 mg tablet    amLODIPine (NORVASC) 5 mg tablet    aspirin 81 MG tablet    atorvastatin (LIPITOR) 20 mg tablet    carvedilol (COREG) 12 5 mg tablet    dextromethorphan-guaiFENesin (ROBITUSSIN DM)  mg/5 mL syrup    fluticasone (FLONASE) 50 mcg/act nasal spray    losartan (COZAAR) 100 MG tablet    Milk Thistle 1000 MG CAPS    minoxidil (LONITEN) 10 mg tablet    montelukast (SINGULAIR) 10 mg tablet    naproxen (EC NAPROSYN) 500 MG EC tablet    Omega-3 Fatty Acids (FISH OIL) 1,000 mg    omeprazole (PriLOSEC) 40 MG capsule    sertraline (ZOLOFT) 50 mg tablet          Assessment:  Principal Problem:    COVID-19  Active Problems:    Essential hypertension    CLL (chronic lymphocytic leukemia) (HCC)    Pulmonary hypertension (HCC)    Hypercholesterolemia    Mild depression (HCC)    Diverticulitis    BPH with obstruction/lower urinary tract symptoms    Alcohol abuse    Hyperglycemia    Acute respiratory failure with hypoxia (HCC)    QT prolongation    Sinus los-tachy syndrome (HCC)    Anemia    Acute cystitis without hematuria    Transaminitis    NSVT (nonsustained ventricular tachycardia) (HCC)            Counseling / Coordination of Care  Total floor / unit time spent today 25 minutes  Greater than 50% of total time was spent with the patient and / or family counseling and / or coordination of care    A description of the counseling / coordination of care: Andree Mcnamara

## 2021-01-20 NOTE — PLAN OF CARE
Problem: Potential for Falls  Goal: Patient will remain free of falls  Description: INTERVENTIONS:  - Assess patient frequently for physical needs  -  Identify cognitive and physical deficits and behaviors that affect risk of falls  -  [place] fall precautions as indicated by assessment   - Educate patient/family on patient safety including physical limitations  - Instruct patient to call for assistance with activity based on assessment  - Modify environment to reduce risk of injury  - Consider OT/PT consult to assist with strengthening/mobility  Outcome: Progressing     Problem: Nutrition/Hydration-ADULT  Goal: Nutrient/Hydration intake appropriate for improving, restoring or maintaining nutritional needs  Description: Monitor and assess patient's nutrition/hydration status for malnutrition  Collaborate with interdisciplinary team and initiate plan and interventions as ordered  Monitor patient's weight and dietary intake as ordered or per policy  Utilize nutrition screening tool and intervene as necessary  Determine patient's food preferences and provide high-protein, high-caloric foods as appropriate       INTERVENTIONS:  - Monitor oral intake, urinary output, labs, and treatment plans  - Assess nutrition and hydration status and recommend course of action  - Evaluate amount of meals eaten  - Assist patient with eating if necessary   - Allow adequate time for meals  - Recommend/ encourage appropriate diets, oral nutritional supplements, and vitamin/mineral supplements  - Order, calculate, and assess calorie counts as needed  - Recommend, monitor, and adjust tube feedings and TPN/PPN based on assessed needs  - Assess need for intravenous fluids  - Provide specific nutrition/hydration education as appropriate  - Include patient/family/caregiver in decisions related to nutrition  Outcome: Progressing     Problem: Prexisting or High Potential for Compromised Skin Integrity  Goal: Skin integrity is maintained or improved  Description: INTERVENTIONS:  - Identify patients at risk for skin breakdown  - Assess and monitor skin integrity  - Assess and monitor nutrition and hydration status  - Monitor labs   - Assess for incontinence   - Turn and reposition patient  - Assist with mobility/ambulation  - Relieve pressure over bony prominences  - Avoid friction and shearing  - Provide appropriate hygiene as needed including keeping skin clean and dry  - Evaluate need for skin moisturizer/barrier cream  - Collaborate with interdisciplinary team   - Patient/family teaching  - Consider wound care consult   Outcome: Progressing     Problem: SAFETY,RESTRAINT: NV/NON-SELF DESTRUCTIVE BEHAVIOR  Goal: Remains free of harm/injury (restraint for non violent/non self-detsructive behavior)  Description: INTERVENTIONS:  - Instruct patient/family regarding restraint use   - Assess and monitor physiologic and psychological status   - Provide interventions and comfort measures to meet assessed patient needs   - Identify and implement measures to help patient regain control  - Assess readiness for release of restraint   Outcome: Progressing  Goal: Returns to optimal restraint-free functioning  Description: INTERVENTIONS:  - Assess the patient's behavior and symptoms that indicate continued need for restraint  - Identify and implement measures to help patient regain control  - Assess readiness for release of restraint   Outcome: Progressing

## 2021-01-21 VITALS
SYSTOLIC BLOOD PRESSURE: 160 MMHG | DIASTOLIC BLOOD PRESSURE: 74 MMHG | HEIGHT: 66 IN | HEART RATE: 65 BPM | WEIGHT: 162.04 LBS | BODY MASS INDEX: 26.04 KG/M2 | OXYGEN SATURATION: 97 % | RESPIRATION RATE: 18 BRPM | TEMPERATURE: 98 F

## 2021-01-21 LAB
ALBUMIN SERPL BCP-MCNC: 3 G/DL (ref 3.5–5)
ALP SERPL-CCNC: 86 U/L (ref 46–116)
ALT SERPL W P-5'-P-CCNC: 98 U/L (ref 12–78)
ANION GAP SERPL CALCULATED.3IONS-SCNC: 8 MMOL/L (ref 4–13)
AST SERPL W P-5'-P-CCNC: 36 U/L (ref 5–45)
BILIRUB SERPL-MCNC: 1.08 MG/DL (ref 0.2–1)
BUN SERPL-MCNC: 16 MG/DL (ref 5–25)
CALCIUM ALBUM COR SERPL-MCNC: 9 MG/DL (ref 8.3–10.1)
CALCIUM SERPL-MCNC: 8.2 MG/DL (ref 8.3–10.1)
CHLORIDE SERPL-SCNC: 99 MMOL/L (ref 100–108)
CO2 SERPL-SCNC: 27 MMOL/L (ref 21–32)
CREAT SERPL-MCNC: 0.55 MG/DL (ref 0.6–1.3)
D DIMER PPP FEU-MCNC: 2.12 UG/ML FEU
GFR SERPL CREATININE-BSD FRML MDRD: 107 ML/MIN/1.73SQ M
GLUCOSE SERPL-MCNC: 107 MG/DL (ref 65–140)
GLUCOSE SERPL-MCNC: 143 MG/DL (ref 65–140)
GLUCOSE SERPL-MCNC: 155 MG/DL (ref 65–140)
POTASSIUM SERPL-SCNC: 4.5 MMOL/L (ref 3.5–5.3)
PROT SERPL-MCNC: 6.5 G/DL (ref 6.4–8.2)
SODIUM SERPL-SCNC: 134 MMOL/L (ref 136–145)

## 2021-01-21 PROCEDURE — 94761 N-INVAS EAR/PLS OXIMETRY MLT: CPT

## 2021-01-21 PROCEDURE — 85379 FIBRIN DEGRADATION QUANT: CPT | Performed by: NURSE PRACTITIONER

## 2021-01-21 PROCEDURE — 82948 REAGENT STRIP/BLOOD GLUCOSE: CPT

## 2021-01-21 PROCEDURE — 99239 HOSP IP/OBS DSCHRG MGMT >30: CPT | Performed by: INTERNAL MEDICINE

## 2021-01-21 PROCEDURE — 80053 COMPREHEN METABOLIC PANEL: CPT | Performed by: NURSE PRACTITIONER

## 2021-01-21 PROCEDURE — 97116 GAIT TRAINING THERAPY: CPT

## 2021-01-21 RX ORDER — PANTOPRAZOLE SODIUM 40 MG/1
40 TABLET, DELAYED RELEASE ORAL
Qty: 30 TABLET | Refills: 0 | Status: SHIPPED | OUTPATIENT
Start: 2021-01-22 | End: 2021-01-21

## 2021-01-21 RX ORDER — PANTOPRAZOLE SODIUM 40 MG/1
40 TABLET, DELAYED RELEASE ORAL
Qty: 30 TABLET | Refills: 0 | Status: SHIPPED | OUTPATIENT
Start: 2021-01-22 | End: 2021-02-26 | Stop reason: ALTCHOICE

## 2021-01-21 RX ORDER — ALBUTEROL SULFATE 90 UG/1
2 AEROSOL, METERED RESPIRATORY (INHALATION) EVERY 4 HOURS PRN
Qty: 1 INHALER | Refills: 0 | Status: SHIPPED | OUTPATIENT
Start: 2021-01-21 | End: 2021-02-05 | Stop reason: ALTCHOICE

## 2021-01-21 RX ORDER — ATORVASTATIN CALCIUM 40 MG/1
40 TABLET, FILM COATED ORAL DAILY
Qty: 30 TABLET | Refills: 0 | Status: SHIPPED | OUTPATIENT
Start: 2021-01-21 | End: 2021-03-11

## 2021-01-21 RX ORDER — MULTIVITAMIN/IRON/FOLIC ACID 18MG-0.4MG
1 TABLET ORAL DAILY
Qty: 30 TABLET | Refills: 0 | Status: SHIPPED | OUTPATIENT
Start: 2021-01-22 | End: 2021-02-05 | Stop reason: SDUPTHER

## 2021-01-21 RX ORDER — AMOXICILLIN 250 MG
1 CAPSULE ORAL
Qty: 30 TABLET | Refills: 0 | Status: SHIPPED | OUTPATIENT
Start: 2021-01-21 | End: 2021-11-10 | Stop reason: ALTCHOICE

## 2021-01-21 RX ORDER — AMOXICILLIN 250 MG
1 CAPSULE ORAL
Qty: 30 TABLET | Refills: 0 | Status: SHIPPED | OUTPATIENT
Start: 2021-01-21 | End: 2021-01-21

## 2021-01-21 RX ORDER — PREDNISONE 10 MG/1
40 TABLET ORAL DAILY
Qty: 22 TABLET | Refills: 0 | Status: SHIPPED | OUTPATIENT
Start: 2021-01-21 | End: 2021-01-22 | Stop reason: SDUPTHER

## 2021-01-21 RX ORDER — ALBUTEROL SULFATE 90 UG/1
2 AEROSOL, METERED RESPIRATORY (INHALATION) EVERY 4 HOURS PRN
Qty: 1 INHALER | Refills: 0 | Status: SHIPPED | OUTPATIENT
Start: 2021-01-21 | End: 2021-01-21

## 2021-01-21 RX ADMIN — MONTELUKAST SODIUM 10 MG: 10 TABLET, FILM COATED ORAL at 08:41

## 2021-01-21 RX ADMIN — CHLORHEXIDINE GLUCONATE 0.12% ORAL RINSE 15 ML: 1.2 LIQUID ORAL at 08:41

## 2021-01-21 RX ADMIN — ENOXAPARIN SODIUM 80 MG: 80 INJECTION SUBCUTANEOUS at 09:46

## 2021-01-21 RX ADMIN — Medication 2000 UNITS: at 08:41

## 2021-01-21 RX ADMIN — PANTOPRAZOLE SODIUM 40 MG: 40 TABLET, DELAYED RELEASE ORAL at 05:16

## 2021-01-21 RX ADMIN — ASPIRIN 81 MG: 81 TABLET ORAL at 08:40

## 2021-01-21 RX ADMIN — FOLIC ACID 1 MG: 1 TABLET ORAL at 08:40

## 2021-01-21 RX ADMIN — METOPROLOL TARTRATE 25 MG: 25 TABLET, FILM COATED ORAL at 08:41

## 2021-01-21 RX ADMIN — SERTRALINE HYDROCHLORIDE 50 MG: 50 TABLET ORAL at 08:40

## 2021-01-21 RX ADMIN — PREDNISONE 40 MG: 20 TABLET ORAL at 08:41

## 2021-01-21 RX ADMIN — AMLODIPINE BESYLATE 5 MG: 5 TABLET ORAL at 08:40

## 2021-01-21 RX ADMIN — Medication 1 TABLET: at 08:40

## 2021-01-21 RX ADMIN — THIAMINE HCL TAB 100 MG 100 MG: 100 TAB at 08:40

## 2021-01-21 RX ADMIN — INSULIN LISPRO 1 UNITS: 100 INJECTION, SOLUTION INTRAVENOUS; SUBCUTANEOUS at 00:31

## 2021-01-21 NOTE — NURSING NOTE
Pt colton/roberto removed  AVS reviewed  Questions answered  Scripts originally sent to pt personal pharmacy, but due to pharmacy unable to deliver this late in the day, pt requested meds [facility] beds [facility] to verify that scripts were sent  Orders were not yet placed to homestar but while giving report to JOSE Capps told her to double check that patient leaves with medications in hand prior to d/c  Pt will be leaving in stable condition via wheelchair van once oxygen is delivered home and meds delivered to bedside    Thalia Dee RN

## 2021-01-21 NOTE — CASE MANAGEMENT
CM made aware patient is in need of home oxygen home Wilizuly Moreno  CM contacted patient via telephone d/t Covid to discuss dcp  Patient is requesting referral to [facility] d/t past poistive experience and requests PT Shanon MYERS agreed to place referral and request Shanon  LOUIS reviewed need for home oxygen  CM provided DME company options and patient chooses [facility] d/t convieniance  PERRYVDUDLEY referral placed via [facility] and requested Shanon PT  CM placed referral to [facility] and uploaded Script and supporting documentation for oxygen via [facility]  Patient requests transport home  CM review OOP cost of w/c transport in the event he doesn't qualify for BLS, patient agreeable  Patient accepted by both [facility] and [facility]  AVS updated  CM contacted [place] [facility] at [facility] to discuss transport for patient to home  Neither w/c van or BLS can secure E-tank oxygen for transport d/t size and inability to secure it  Per Aurelia Chaidez, oxygen will need to be in place at patient's home prior to transport taking him home  The other option would be for family to transport  CM contacted patient's son Claire Frenchs to discuss above  Per Claire Junes, he is not comfortable transporting patient d/t covid and need for 3 more days of quarantine  Claire Junes states he can be at his father's home by 430-5pm today to accept oxygen delivery  CM notified [facility] liaison Laban Koyanagi of need for coordination of O2 delivery prior to patient being able to d/c  Laban Koyanagi to f/u with delivery team to ensure O2 delivery b/w 430-530 pm this evening  CM placed referral to [facility] via [facility] requesting 6pm w/c transport this evening  Awaiting return response

## 2021-01-21 NOTE — PLAN OF CARE
Problem: PHYSICAL THERAPY ADULT  Goal: Performs mobility at highest level of function for planned discharge setting  See evaluation for individualized goals  Description: Treatment/Interventions: Functional transfer training, LE strengthening/ROM, Elevations, Therapeutic exercise, Endurance training, Patient/family training, Equipment eval/education, Bed mobility, Gait training          See flowsheet documentation for full assessment, interventions and recommendations  Outcome: Progressing  Note: Prognosis: Good  Problem List: Decreased strength, Decreased endurance, Impaired balance, Decreased mobility  Assessment: Pt is agreeable to participate in PT intervention, continues to appear well-motivated throughout  Pt recently weaned off 1L NC O2 onto RA, coordinated w/ RN to assist w/ O2 requirements during session  Pt continues to be S for all transfers and mobility w/ RW, however SpO2 dropped on RA after ~20 ft from 94% to 83% without reported SOB or fatigue from patient  Placed on 1L, increased to 90% and then again dropped to 87-88% after ~20 ft, increased back to >90% after ~45s  Pt also participated in 5 Time STS from recliner chair without BUE use to assess fall risk and overall for functional strength and endurance  Pt would continue to benefit from practice w/ OOB mobility including transfers, gait, and initiating stair navigation to facilitate return to previous living environment  PT Discharge Recommendation: Home with skilled therapy, Other (Comment)(HHPT)       See flowsheet documentation for full assessment

## 2021-01-21 NOTE — PROGRESS NOTES
Patient called twice and is upset and irritable regarding the amount of time his discharge is taking  [facility]urse [facility] and was told they are filling his prescriptions and will receive a tiger text when meds are ready  Kain Roa, , is to call patient and answer some of his questions when available  Per Kain Roa, patient told her he has 2 walkers at home  Still awaiting a time for patient

## 2021-01-21 NOTE — DISCHARGE INSTR - AVS FIRST PAGE
Dear Noam Carter,     It was our pleasure to care for you here at [facility], [address]  It is our hope that we were always able to exceed the expected standards for your care during your stay  You were hospitalized due to covid 19 pneumonia  You were cared for on the 3rd floor under the service of Rosana Moffett MD with the [facility] who covers for your primary care physician (PCP), Yovani Lara MD, while you were hospitalized  If you have any questions or concerns related to this hospitalization, you may contact us at [phone]  For follow up as well as medication refills, we recommend that you follow up with your primary care physician  A registered nurse will reach out to you by phone within a few days after your discharge to answer any additional questions that you may have after going home  However, at this time we provide for you here, the most important instructions / recommendations at discharge:     · Notable Medication Adjustments -   · We started you on metoprolol and given That you had issues with a low heart rate during this hospitalization will keep you on this medication because he was stable with regard to heart rate on this    Your carvedilol will therefore be discontinued  ·   We started you on Protonix to protect the lining of your stomach  ·  we discharged you on a prednisone taper to take 40 mg once a day for 1 day then 30 mg once a day for 3 days then 20 mg once a day for 3 days then 10 mg once a day for 3 days then to stop  ·  we discharged on Eliquis 2 5 mg to be taken twice a day to prevent blood clots which we know can be a side effect of COVID-19 infections  · Testing Required after Discharge -   ·  you should follow-up with your PCP early next week  ·  please purchase a pulse oximeter so that you can measure  your saturations at home  · Important follow up information -   ·  if you experience worsening chest pain shortness of breath leg swelling altered mental status or chest pain please urgently contact your come back into the emergency room  · Other Instructions -   ·  you will be discharged on home oxygen currently will need 2 L  ·  he should follow-up with Pulmonary Medicine I have attached their contact information  ·  you should follow-up with Cardiology I have attached their contact information  ·  you should follow-up with your primary care physician early next week  · Please review this entire after visit summary as additional general instructions including medication list, appointments, activity, diet, any pertinent wound care, and other additional recommendations from your care team that may be provided for you  Sincerely,     Alexander Marshall MD       COVID-19 Home Care Guidelines    Your healthcare provider and/or public health staff have evaluated you and have determined that you do not need to remain in the hospital at this time  At this time you can be isolated at home where you will be monitored by staff from your local or state health department  You should carefully follow the prevention and isolation steps below until a healthcare provider or local or state health department says that you can return to your normal activities  Stay home except to get medical care    People who are mildly ill with COVID-19 are able to isolate at home during their illness  You should restrict activities outside your home, except for getting medical care  Do not go to work, school, or public areas  Avoid using public transportation, ride-sharing, or taxis  Separate yourself from other people and animals in your home    People: As much as possible, you should stay in a specific room and away from other people in your home  Also, you should use a separate bathroom, if available  Animals:  You should restrict contact with pets and other animals while you are sick with COVID-19, just like you would around other people  Although there have not been reports of pets or other animals becoming sick with COVID-19, it is still recommended that people sick with COVID-19 limit contact with animals until more information is known about the virus  When possible, have another member of your household care for your animals while you are sick  If you are sick with COVID-19, avoid contact with your pet, including petting, snuggling, being kissed or licked, and sharing food  If you must care for your pet or be around animals while you are sick, wash your hands before and after you interact with pets and wear a facemask  See COVID-19 and Animals for more information  Call ahead before visiting your doctor    If you have a medical appointment, call the healthcare provider and tell them that you have or may have COVID-19  This will help the healthcare providers office take steps to keep other people from getting infected or exposed  Wear a facemask    You should wear a facemask when you are around other people (e g , sharing a room or vehicle) or pets and before you enter a healthcare providers office  If you are not able to wear a facemask (for example, because it causes trouble breathing), then people who live with you should not stay in the same room with you, or they should wear a facemask if they enter your room  Cover your coughs and sneezes    Cover your mouth and nose with a tissue when you cough or sneeze  Throw used tissues in a lined trash can  Immediately wash your hands with soap and water for at least 20 seconds or, if soap and water are not available, clean your hands with an alcohol-based hand  that contains at least 60% alcohol  Clean your hands often    Wash your hands often with soap and water for at least 20 seconds, especially after blowing your nose, coughing, or sneezing; going to the bathroom; and before eating or preparing food   If soap and water are not readily available, use an alcohol-based hand  with at least 60% alcohol, covering all surfaces of your hands and rubbing them together until they feel dry  Soap and water are the best option if hands are visibly dirty  Avoid touching your eyes, nose, and mouth with unwashed hands  Avoid sharing personal household items    You should not share dishes, drinking glasses, cups, eating utensils, towels, or bedding with other people or pets in your home  After using these items, they should be washed thoroughly with soap and water  Clean all high-touch surfaces everyday    High touch surfaces include counters, tabletops, doorknobs, bathroom fixtures, toilets, phones, keyboards, tablets, and bedside tables  Also, clean any surfaces that may have blood, stool, or body fluids on them  Use a household cleaning spray or wipe, according to the label instructions  Labels contain instructions for safe and effective use of the cleaning product including precautions you should take when applying the product, such as wearing gloves and making sure you have good ventilation during use of the product  Monitor your symptoms    Seek prompt medical attention if your illness is worsening (e g , difficulty breathing)  Before seeking care, call your healthcare provider and tell them that you have, or are being evaluated for, COVID-19  Put on a facemask before you enter the facility  These steps will help the healthcare providers office to keep other people in the office or waiting room from getting infected or exposed  Ask your healthcare provider to call the local or [facility] health department  Persons who are placed under active monitoring or facilitated self-monitoring should follow instructions provided by their local health department or occupational health professionals, as appropriate  If you have a medical emergency and need to call 911, notify the dispatch personnel that you have, or are being evaluated for COVID-19   If possible, put on a facemask before emergency medical services arrive  Discontinuing home isolation    Patients with confirmed COVID-19 should remain under home isolation precautions until the following conditions are met:   - They have had no fever for at least 24 hours (that is one full day of no fever without the use medicine that reduces fevers)  AND  - other symptoms have improved (for example, when their cough or shortness of breath have improved)  AND  - If had mild or moderate illness, at least 10 days have passed since their symptoms first appeared or if severe illness (needed oxygen) or immunosuppressed, at least 20 days have passed since symptoms first appeared  Patients with confirmed COVID-19 should also notify close contacts (including their workplace) and ask that they self-quarantine  Currently, close contact is defined as being within 6 feet for 15 minutes or more from the period 24 hours starting 48 hours before symptom onset to the time at which the patient went into isolation  Close contacts of patients diagnosed with COVID-19 should be instructed by the patient to self-quarantine for 14 days from the last time of their last contact with the patient       Source: [employer]

## 2021-01-21 NOTE — PROGRESS NOTES
Patient's medications from [address] delievered, walker delivered, and patient's belongings all packed up

## 2021-01-21 NOTE — PLAN OF CARE
Problem: Potential for Falls  Goal: Patient will remain free of falls  Description: INTERVENTIONS:  - Assess patient frequently for physical needs  -  Identify cognitive and physical deficits and behaviors that affect risk of falls  -  [place] fall precautions as indicated by assessment   - Educate patient/family on patient safety including physical limitations  - Instruct patient to call for assistance with activity based on assessment  - Modify environment to reduce risk of injury  - Consider OT/PT consult to assist with strengthening/mobility  Outcome: Progressing     Problem: Nutrition/Hydration-ADULT  Goal: Nutrient/Hydration intake appropriate for improving, restoring or maintaining nutritional needs  Description: Monitor and assess patient's nutrition/hydration status for malnutrition  Collaborate with interdisciplinary team and initiate plan and interventions as ordered  Monitor patient's weight and dietary intake as ordered or per policy  Utilize nutrition screening tool and intervene as necessary  Determine patient's food preferences and provide high-protein, high-caloric foods as appropriate       INTERVENTIONS:  - Monitor oral intake, urinary output, labs, and treatment plans  - Assess nutrition and hydration status and recommend course of action  - Evaluate amount of meals eaten  - Assist patient with eating if necessary   - Allow adequate time for meals  - Recommend/ encourage appropriate diets, oral nutritional supplements, and vitamin/mineral supplements  - Order, calculate, and assess calorie counts as needed  - Recommend, monitor, and adjust tube feedings and TPN/PPN based on assessed needs  - Assess need for intravenous fluids  - Provide specific nutrition/hydration education as appropriate  - Include patient/family/caregiver in decisions related to nutrition  Outcome: Progressing     Problem: Prexisting or High Potential for Compromised Skin Integrity  Goal: Skin integrity is maintained or improved  Description: INTERVENTIONS:  - Identify patients at risk for skin breakdown  - Assess and monitor skin integrity  - Assess and monitor nutrition and hydration status  - Monitor labs   - Assess for incontinence   - Turn and reposition patient  - Assist with mobility/ambulation  - Relieve pressure over bony prominences  - Avoid friction and shearing  - Provide appropriate hygiene as needed including keeping skin clean and dry  - Evaluate need for skin moisturizer/barrier cream  - Collaborate with interdisciplinary team   - Patient/family teaching  - Consider wound care consult   Outcome: Progressing     Problem: SAFETY,RESTRAINT: NV/NON-SELF DESTRUCTIVE BEHAVIOR  Goal: Remains free of harm/injury (restraint for non violent/non self-detsructive behavior)  Description: INTERVENTIONS:  - Instruct patient/family regarding restraint use   - Assess and monitor physiologic and psychological status   - Provide interventions and comfort measures to meet assessed patient needs   - Identify and implement measures to help patient regain control  - Assess readiness for release of restraint   Outcome: Progressing  Goal: Returns to optimal restraint-free functioning  Description: INTERVENTIONS:  - Assess the patient's behavior and symptoms that indicate continued need for restraint  - Identify and implement measures to help patient regain control  - Assess readiness for release of restraint   Outcome: Progressing

## 2021-01-21 NOTE — PLAN OF CARE
Problem: Potential for Falls  Goal: Patient will remain free of falls  Description: INTERVENTIONS:  - Assess patient frequently for physical needs  -  Identify cognitive and physical deficits and behaviors that affect risk of falls  -  [place] fall precautions as indicated by assessment   - Educate patient/family on patient safety including physical limitations  - Instruct patient to call for assistance with activity based on assessment  - Modify environment to reduce risk of injury  - Consider OT/PT consult to assist with strengthening/mobility  Outcome: Progressing     Problem: Nutrition/Hydration-ADULT  Goal: Nutrient/Hydration intake appropriate for improving, restoring or maintaining nutritional needs  Description: Monitor and assess patient's nutrition/hydration status for malnutrition  Collaborate with interdisciplinary team and initiate plan and interventions as ordered  Monitor patient's weight and dietary intake as ordered or per policy  Utilize nutrition screening tool and intervene as necessary  Determine patient's food preferences and provide high-protein, high-caloric foods as appropriate       INTERVENTIONS:  - Monitor oral intake, urinary output, labs, and treatment plans  - Assess nutrition and hydration status and recommend course of action  - Evaluate amount of meals eaten  - Assist patient with eating if necessary   - Allow adequate time for meals  - Recommend/ encourage appropriate diets, oral nutritional supplements, and vitamin/mineral supplements  - Order, calculate, and assess calorie counts as needed  - Recommend, monitor, and adjust tube feedings and TPN/PPN based on assessed needs  - Assess need for intravenous fluids  - Provide specific nutrition/hydration education as appropriate  - Include patient/family/caregiver in decisions related to nutrition  Outcome: Progressing     Problem: Prexisting or High Potential for Compromised Skin Integrity  Goal: Skin integrity is maintained or improved  Description: INTERVENTIONS:  - Identify patients at risk for skin breakdown  - Assess and monitor skin integrity  - Assess and monitor nutrition and hydration status  - Monitor labs   - Assess for incontinence   - Turn and reposition patient  - Assist with mobility/ambulation  - Relieve pressure over bony prominences  - Avoid friction and shearing  - Provide appropriate hygiene as needed including keeping skin clean and dry  - Evaluate need for skin moisturizer/barrier cream  - Collaborate with interdisciplinary team   - Patient/family teaching  - Consider wound care consult   Outcome: Progressing     Problem: SAFETY,RESTRAINT: NV/NON-SELF DESTRUCTIVE BEHAVIOR  Goal: Remains free of harm/injury (restraint for non violent/non self-detsructive behavior)  Description: INTERVENTIONS:  - Instruct patient/family regarding restraint use   - Assess and monitor physiologic and psychological status   - Provide interventions and comfort measures to meet assessed patient needs   - Identify and implement measures to help patient regain control  - Assess readiness for release of restraint   Outcome: Progressing  Goal: Returns to optimal restraint-free functioning  Description: INTERVENTIONS:  - Assess the patient's behavior and symptoms that indicate continued need for restraint  - Identify and implement measures to help patient regain control  - Assess readiness for release of restraint   Outcome: Progressing

## 2021-01-21 NOTE — PHYSICAL THERAPY NOTE
PHYSICAL THERAPY TREATMENT NOTE    Patient Name: Alla IRELAND Date: 1/21/2021 01/21/21 1035   PT Last Visit   PT Visit Date 01/21/21   Note Type   Note Type Treatment   Pain Assessment   Pain Assessment Tool Pain Assessment not indicated - pt denies pain   Pain Score No Pain   Restrictions/Precautions   Weight Bearing Precautions Per Order No   Other Precautions Airborne/isolation;Droplet precautions;Contact/isolation;O2  (RA --> 1L, masimo)   General   Chart Reviewed Yes   Additional Pertinent History Per RN, pt was on 1L NC O2 this AM, recently weaned to RA  Pt 94% at rest, after ambulating ~20 ft, desat to 83% (confirmed via masimo and dynamap), pt denies increased SOB  Required >1-2 min to increase to 88%, RN placed on 1 L NC O2 and increased to >90%  Pt at 91%, ambulated ~20 ft again w/ RW on 1L before dropping to 88%  Required ~45s to increase to >90%  Notified RN Shira Proctor to RT Shahid    Response to Previous Treatment Patient with no complaints from previous session  Cognition   Overall Cognitive Status WFL   Arousal/Participation Alert; Cooperative   Attention Attends with cues to redirect   Orientation Level Oriented X4   Memory Decreased recall of recent events; Other (Comment)   Following Commands Follows multistep commands without difficulty   Comments Pt seated OOB in recliner chair  He is pleasant and agreeable to participate in PT evaluation  Bed Mobility   Supine to Sit Unable to assess   Sit to Supine Unable to assess   Transfers   Sit to Stand 5  Supervision   Additional items Assist x 1; Armrests   Stand to Sit 5  Supervision   Additional items Assist x 1; Armrests   Ambulation/Elevation   Gait pattern Forward Flexion; Short stride   Gait Assistance 5  Supervision   Additional items Assist x 1   Assistive Device Rolling walker   Distance 40 ft x 2  (seated rest break between)   Balance   Static Sitting Good   Static Standing Fair -   Ambulatory Fair -  (w/ RW)   Endurance Deficit   Endurance Deficit Yes   Activity Tolerance   Activity Tolerance Patient limited by fatigue  (SpO2 requirements)   Nurse Made Aware Spoke to  [facility] Staff Made Aware Spoke to RT [facility]   Exercises   Balance training  5 Time STS = 18 3s (<11 4s is at increased risk for those 61-75 y/o)  (Able to perform STS w/ no UE support w/ CGA)   Assessment   Prognosis Good   Problem List Decreased strength;Decreased endurance; Impaired balance;Decreased mobility   Assessment Pt is agreeable to participate in PT intervention, continues to appear well-motivated throughout  Pt recently weaned off 1L NC O2 onto RA, coordinated w/ RN to assist w/ O2 requirements during session  Pt continues to be S for all transfers and mobility w/ RW, however SpO2 dropped on RA after ~20 ft from 94% to 83% without reported SOB or fatigue from patient  Placed on 1L, increased to 90% and then again dropped to 87-88% after ~20 ft, increased back to >90% after ~45s  Pt also participated in 5 Time STS from recliner chair without BUE use to assess fall risk and overall for functional strength and endurance  Pt would continue to benefit from practice w/ OOB mobility including transfers, gait, and initiating stair navigation to facilitate return to previous living environment      Goals   Patient Goals to shower this afternoon, and to be discharged together w/ his wife   STG Expiration Date 02/02/21   Short Term Goal #1 1) PERFORM BED MOBILITY MOD-I TO PARTICIPATE IN FREQUENT REPOSITIONING AND IMPROVE SKIN INTEGRITY; 2) PERFORM SIT<-->STAND TRANSFER MOD-I TO PROMOTE I WITH TOILETING AND OOB MOBILITY; 3) AMBULATE 200 FEET MOD-I W/ LEAST RESTRICTIVE DEVICE TO PARTICIPATE IN HOUSEHOLD AND COMMUNITY LEVEL AMBULATION; 4) IMPROVE B/L LE STRENGTH BY 1/2 GRADE TO IMPROVE EFFICIENCY OF TRANSFERS; 5) IMPROVE BALANCE BY 1 GRADE TO REDUCE RISK FOR FALLS; 6) NAVIGATE 3 STEPS S LEVEL IN ORDER TO SAFELY NAVIGATE IN/OUT OF HOME   PT Treatment Day 2   Plan   Treatment/Interventions Functional transfer training;LE strengthening/ROM; Elevations; Therapeutic exercise; Endurance training;Equipment eval/education;Patient/family training;Gait training;Bed mobility   Progress Progressing toward goals   PT Frequency 5x/wk   Recommendation   PT Discharge Recommendation Home with skilled therapy; Other (Comment)  (HHPT)   Equipment Recommended Walker  (RW)   AM-PAC Basic Mobility Inpatient   Turning in Bed Without Bedrails 3   Lying on Back to Sitting on Edge of Flat Bed 3   Moving Bed to Chair 3   Standing Up From Chair 3   Walk in Room 3   Climb 3-5 Stairs 3   Basic Mobility Inpatient Raw Score 18   Basic Mobility Standardized Score 41 05     Pt would continue to benefit from skilled PT during this admission in order to progress patient towards goals to decrease risk of falls and maximize independence       Sybil Dickson, PT, DPT

## 2021-01-21 NOTE — CASE MANAGEMENT
CM received confirmation from [facility] that patient is set up for a 1830 W/C transport via [address] to his residence today  CM received call from patient's son Kain Rivas that patient's oxygen has been delivered to his home and is ready to go  CM contacted patient via telephone to make him aware of above  Patient states that he is now unsure he has a walker at home and would like one  CM s/w patient's son simultaniously and made them both aware that per pt's previous account he received a walker follwing knew surgery about 3 years ago he would not be [facility] only [facility] 1 piece of DME every 5 years  CM reviewed that OOP cost of walker is about $99 dollars  Both patient and son agree that they would rather patient go home with ta new walker and pay for it if medicare doesn't cover than risk him not having one at home  CM agrees to request script for walker  Referral placed to [facility], awaiting script from Dr Keanu Chahal, walker provided to nurse Kia Mcintyre to give to patient as patient wants the walker whether insurance approves or not  CM dept will continue to follow

## 2021-01-21 NOTE — PROGRESS NOTES
Patient had some questions regarding his discharge  All questions were answered to the best of my ability  Reached out to Jairo Cheung,  regarding these questions  Patient seems very anxious and continues to reiterate he cannot think correctly because his wife is also admitted in the hospital  Patient now states he is unsure if he has a walker at home or not, Coco Espinoza notified

## 2021-01-21 NOTE — RESPIRATORY THERAPY NOTE
Home Oxygen Qualifying Test       Patient name: Lynne Costa        : 1950   Date of Test:  2021  Diagnosis: Covid 19     Home Oxygen Test:    **Medicare Guidelines require item(s) 1-5 on all ambulatory patients or 1 and 2 on non-ambulatory patients  1   Baseline SPO2 on Room Air at rest: 91%  2   SPO2 during exercise on Room Air: 86-90%  During exercise monitor SpO2  If SPO2 increases >=89% with ambulation do not add supplemental             oxygen  If <= 88% on room air add O2 via NC and titrate patient  Patient must be ambulated with O2 and titrated to > 88% with exertion  3   SPO2 on Oxygen at rest: 94% 2 lpm     4   SPO2 during exercise on Oxygen: 89-90% a liter flow of 2 lpm     5   Exercise performed:         Pt was only able to walk for about 4 minutes as tolerated with walker          [x]  Supplemental Home Oxygen is indicated  []  Client does not qualify for home oxygen  Respiratory Additional Notes: PT qualifies for 2L NC  Pt had desaturation below 88% on RA  Placed on 2L NC and no further desaturation      Kait Ni, RT

## 2021-01-22 ENCOUNTER — TELEMEDICINE (OUTPATIENT)
Dept: FAMILY MEDICINE CLINIC | Facility: CLINIC | Age: 71
End: 2021-01-22
Payer: MEDICARE

## 2021-01-22 ENCOUNTER — TRANSITIONAL CARE MANAGEMENT (OUTPATIENT)
Dept: FAMILY MEDICINE CLINIC | Facility: CLINIC | Age: 71
End: 2021-01-22

## 2021-01-22 ENCOUNTER — TELEPHONE (OUTPATIENT)
Dept: FAMILY MEDICINE CLINIC | Facility: CLINIC | Age: 71
End: 2021-01-22

## 2021-01-22 VITALS — OXYGEN SATURATION: 94 % | HEART RATE: 65 BPM

## 2021-01-22 DIAGNOSIS — F32.A MILD DEPRESSION: Primary | Chronic | ICD-10-CM

## 2021-01-22 DIAGNOSIS — I27.20 PULMONARY HYPERTENSION (HCC): Chronic | ICD-10-CM

## 2021-01-22 DIAGNOSIS — I10 ESSENTIAL HYPERTENSION: Chronic | ICD-10-CM

## 2021-01-22 DIAGNOSIS — D50.9 IRON DEFICIENCY ANEMIA, UNSPECIFIED IRON DEFICIENCY ANEMIA TYPE: Chronic | ICD-10-CM

## 2021-01-22 DIAGNOSIS — U07.1 COVID-19: ICD-10-CM

## 2021-01-22 DIAGNOSIS — E78.00 HYPERCHOLESTEROLEMIA: Chronic | ICD-10-CM

## 2021-01-22 DIAGNOSIS — F51.01 PRIMARY INSOMNIA: ICD-10-CM

## 2021-01-22 DIAGNOSIS — F10.10 ALCOHOL ABUSE: Chronic | ICD-10-CM

## 2021-01-22 DIAGNOSIS — K21.9 GASTROESOPHAGEAL REFLUX DISEASE WITHOUT ESOPHAGITIS: Chronic | ICD-10-CM

## 2021-01-22 DIAGNOSIS — C91.10 CLL (CHRONIC LYMPHOCYTIC LEUKEMIA) (HCC): Chronic | ICD-10-CM

## 2021-01-22 DIAGNOSIS — J96.01 ACUTE RESPIRATORY FAILURE WITH HYPOXIA (HCC): ICD-10-CM

## 2021-01-22 PROBLEM — N30.00 ACUTE CYSTITIS WITHOUT HEMATURIA: Status: RESOLVED | Noted: 2021-01-15 | Resolved: 2021-01-22

## 2021-01-22 PROCEDURE — 99496 TRANSJ CARE MGMT HIGH F2F 7D: CPT | Performed by: FAMILY MEDICINE

## 2021-01-22 RX ORDER — MULTIVITAMIN
1 TABLET ORAL DAILY
Qty: 90 TABLET | Refills: 3 | Status: SHIPPED | OUTPATIENT
Start: 2021-01-22 | End: 2021-06-28 | Stop reason: HOSPADM

## 2021-01-22 RX ORDER — PREDNISONE 10 MG/1
40 TABLET ORAL DAILY
Qty: 22 TABLET | Refills: 0 | Status: SHIPPED | OUTPATIENT
Start: 2021-01-22 | End: 2021-02-01 | Stop reason: ALTCHOICE

## 2021-01-22 RX ORDER — LORAZEPAM 0.5 MG/1
0.5 TABLET ORAL
Qty: 15 TABLET | Refills: 0 | Status: SHIPPED | OUTPATIENT
Start: 2021-01-22 | End: 2021-02-05 | Stop reason: SDUPTHER

## 2021-01-22 NOTE — DISCHARGE SUMMARY
Discharge- Radha Magdaleno 1950, 78 y o  male MRN: [id number]    Unit/Bed#: S -01 Encounter: [id number]    Primary Care Provider: Colonel Norbreto MD   Date and time admitted to hospital: 1/10/2021 12:16 PM        Bradycardia  Assessment & Plan  Cardiology did see the patient  They feel bradycardia is sedondary to propofol infusion  Stable to be DC on metoprolol    CLL (chronic lymphocytic leukemia) [facility])  Assessment & Plan  Outpatient follow up with hematology oncology  Essential hypertension  Assessment & Plan  160/74 - stable for discharge  May need outpatient adjustment of medication    * COVID-19  Assessment & Plan  Discharge on home oxygen and prednisone taper vitamin-C and zinc   Discussed at length with the patient's son regarding discharge plan and what his needs will be as well as isolation up until the 25th of January  Also discussed with son that the wife should not be discharged home on the same day as the patient given that he is her main caregiver and he will need to get use to new medication regimen as well as oxygen requirements  Overall the patient is doing better and eager to be discharged home          Discharging Physician / Practitioner: Zena Aden MD  PCP: Colonel Norberto MD  Admission Date:   Admission Orders (From admission, onward)     Ordered        01/10/21 1217  Inpatient Admission  Once                   Discharge Date: 01/21/21    Resolved Problems  Date Reviewed: 1/21/2021    None          Consultations During Hospital Stay:  · Cardiology  · Pulmonology/critical care    Procedures Performed:   · Endotracheal intubation    Significant Findings / Test Results:   · Chest x-ray compatible with COVID-19 pneumonia    Incidental Findings:   · Bradycardia     Test Results Pending at Discharge (will require follow up):    · None     Outpatient Tests Requested:  · None    Complications:  None    Reason for Admission:  Shortness of breath    Hospital Course: Theron Hayward is a 78 y o  male patient who originally presented to the hospital on 1/10/2021 due to shortness of breath and acute respiratory failure  Patient was admitted to ICU and underwent intubation he was extubated successfully weaned off oxygen transferred to the medical-surgical floors  He did have bradycardia for which Cardiology saw him and his Coreg was held and he was started on metoprolol on which she was stable and not bradycardic  Overall the patient improved and felt well enough to be discharged home on the 21st January 2021  He did qualify for oxygen and will be discharged on 2 L continuously  He will follow-up with pulmonology in the outpatient setting and will be discharged on Eliquis 2 5 mg twice a day prednisone taper as well as vitamin-C and zinc        Please see above list of diagnoses and related plan for additional information  Condition at Discharge: stable     Discharge Day Visit / Exam:     Subjective:    Patient seen and examined  Feeling "Good"  Vitals: Blood Pressure: 160/74 (01/21/21 0700)  Pulse: 65 (01/21/21 0700)  Temperature: 98 °F (36 7 °C) (01/21/21 0700)  Temp Source: Oral (01/21/21 0700)  Respirations: 18 (01/21/21 0700)  Height: 5' 6" (167 6 cm)(most recent ht in chart from 11/10/20 encounter) (01/13/21 0957)  Weight - Scale: 73 5 kg (162 lb 0 6 oz) (01/21/21 0546)  SpO2: 97 % (01/21/21 0700)  Exam:   Physical Exam  Constitutional:       General: He is not in acute distress  Appearance: He is not ill-appearing, toxic-appearing or diaphoretic  Cardiovascular:      Rate and Rhythm: Normal rate  Pulmonary:      Effort: No respiratory distress  Breath sounds: No stridor  No wheezing  Abdominal:      General: Abdomen is flat  There is no distension  Palpations: There is no mass  Tenderness: There is no abdominal tenderness  There is no right CVA tenderness, left CVA tenderness, guarding or rebound  Hernia: No hernia is present  Skin:     Capillary Refill: Capillary refill takes less than 2 seconds  Coloration: Skin is pale  Skin is not jaundiced  Findings: No bruising or lesion  Neurological:      General: No focal deficit present  Cranial Nerves: No cranial nerve deficit  Sensory: No sensory deficit  Motor: No weakness  Coordination: Coordination normal       Gait: Gait normal       Deep Tendon Reflexes: Reflexes normal    Psychiatric:         Mood and Affect: Mood normal          Behavior: Behavior is uncooperative  Discussion with Family:  Discussed with patient discussed with son    Discharge instructions/Information to patient and family:   See after visit summary for information provided to patient and family  Provisions for Follow-Up Care:  See after visit summary for information related to follow-up care and any pertinent home health orders  Disposition:     Home    For Discharges to [facility] SNF:   · Not Applicable to this Patient - Not Applicable to this Patient    Planned Readmission:  None     Discharge Statement:  I spent 45 minutes discharging the patient  This time was spent on the day of discharge  I had direct contact with the patient on the day of discharge  Greater than 50% of the total time was spent examining patient, answering all patient questions, arranging and discussing plan of care with patient as well as directly providing post-discharge instructions  Additional time then spent on discharge activities  Discharge Medications:  See after visit summary for reconciled discharge medications provided to patient and family        ** Please Note: This note has been constructed using a voice recognition system **

## 2021-01-22 NOTE — ASSESSMENT & PLAN NOTE
Discharge on home oxygen and prednisone taper vitamin-C and zinc   Discussed at length with the patient's son regarding discharge plan and what his needs will be as well as isolation up until the 25th of January  Also discussed with son that the wife should not be discharged home on the same day as the patient given that he is her main caregiver and he will need to get use to new medication regimen as well as oxygen requirements      Overall the patient is doing better and eager to be discharged home

## 2021-01-22 NOTE — PROGRESS NOTES
Assessment/Plan:       Problem List Items Addressed This Visit        Digestive    Esophageal reflux (Chronic)     He was prescribed omeprazole upon discharge from the hospital which he may continue  He had previously been on Protonix which he can initiate once omeprazole runs out  He does seem to require chronic PPI therapy  Respiratory    Acute respiratory failure with hypoxia (HCC)       Cardiovascular and Mediastinum    Essential hypertension (Chronic)     Stable with current therapy  This will need to monitored closely  Pulmonary hypertension (HCC) (Chronic)     Breathing seems stable at this time  Other    CLL (chronic lymphocytic leukemia) (HCC) (Chronic)     White counts were exceedingly high when hospitalized for COVID-19  Continue routine hematology follow-up         Iron deficiency anemia (Chronic)     Continue monitoring of CBC  Continue routine follow-up with Hematology  Hypercholesterolemia (Chronic)    Mild depression (HCC) - Primary (Chronic)    Relevant Medications    LORazepam (ATIVAN) 0 5 mg tablet    Alcohol abuse (Chronic)     The patient has not drank since his COVID illness in early January  I did give him some lorazepam to help him sleep and told him that he may not drink if taking this medication  Relevant Medications    LORazepam (ATIVAN) 0 5 mg tablet    Insomnia    Relevant Medications    LORazepam (ATIVAN) 0 5 mg tablet    COVID-19     The patient looks well on his virtual visit today  He is oxygenating in the mid 90s consistently even with some moderate ambulation  He will continue with his anticoagulation for a full month  I did rescind his steroid taper as prescribed by Pulmonary/Critical Care  He is having some significant issues with sleep since being extubated and I did refill lorazepam   He notes he is not drinking at this time  He will require close follow-up with myself as well as Pulmonary and Cardiology    Blood pressure should be checked at least daily at home and he agrees to notify me if it is running above 140/90  Relevant Medications    predniSONE 10 mg tablet    Multiple Vitamin (multivitamin) tablet            Subjective:      Patient ID: Destiny Lane is a 78 y o  male  HPI     TCM Call (since 12/22/2020)     Date and time call was made  1/22/2021 10:41 AM    Hospital care reviewed  Records reviewed    Patient was hospitialized at  [facility]        Date of Admission  01/10/21    Date of discharge  01/21/21    Diagnosis  Covid 19, SOB, Acute Respiratory Failure    Disposition  Home    Were the patients medications reviewed and updated  Yes    Current Symptoms  Weakness; Shortness of breath; Fatigue    Shortness of breath severity  Mild    Weakness severity  Mild    Fatigue severity  Moderate      TCM Call (since 12/22/2020)     Post hospital issues  Reduced activity    Should patient be enrolled in anticoag monitoring? Yes    Scheduled for follow up? Yes    Did you obtain your prescribed medications  Yes    Do you need help managing your prescriptions or medications  No    Living Arrangements  Family members    Are you recieving any outpatient services  Yes    What type of services  [address] [facility] you recieving home care services  Yes    Types of home care services  Hospice; Nurse visit          Patient was evaluated today via a virtual visit for his recent hospitalization  TCM will be done at a later date  The patient had severe COVID illness and was intubated  He was treated with steroids as well as doxycycline ceftriaxone initially and then transfer to cefepime vancomycin  He was treated with plasma as well as remdesivir  He is anticoagulated now and is discharged on a steroid taper  He is fatigued and gets short of breath with exertion but feels much better  He denies any further fever or chills  He has continued on oxygen at 2 L without excessive shortness of breath    He denies any chest pain or palpitations  He is tolerating anticoagulation without excessive bruising or bleeding      The following portions of the patient's history were reviewed and updated as appropriate: allergies, current medications, past family history, past medical history, past social history, past surgical history and problem list       Current Outpatient Medications:     albuterol (PROVENTIL HFA,VENTOLIN HFA) 90 mcg/act inhaler, Inhale 2 puffs every 4 (four) hours as needed for wheezing, Disp: 1 Inhaler, Rfl: 0    amLODIPine (NORVASC) 5 mg tablet, TAKE ONE TABLET BY MOUTH EVERY EVENING, Disp: 90 tablet, Rfl: 3    apixaban (ELIQUIS) 2 5 mg, Take 1 tablet (2 5 mg total) by mouth 2 (two) times a day, Disp: 60 tablet, Rfl: 0    aspirin 81 MG tablet, Take 1 tablet by mouth daily, Disp: , Rfl:     atorvastatin (LIPITOR) 40 mg tablet, Take 1 tablet (40 mg total) by mouth daily, Disp: 30 tablet, Rfl: 0    dextromethorphan-guaiFENesin (ROBITUSSIN DM)  mg/5 mL syrup, Take 5 mL by mouth 3 (three) times a day as needed for cough, Disp: 120 mL, Rfl: 0    fluticasone (FLONASE) 50 mcg/act nasal spray, 2 sprays into each nostril daily as needed for allergies, Disp: 3 Bottle, Rfl: 3    LORazepam (ATIVAN) 0 5 mg tablet, Take 1 tablet (0 5 mg total) by mouth daily at bedtime, Disp: 15 tablet, Rfl: 0    metoprolol tartrate (LOPRESSOR) 25 mg tablet, Take 1 tablet (25 mg total) by mouth every 12 (twelve) hours, Disp: 60 tablet, Rfl: 0    montelukast (SINGULAIR) 10 mg tablet, Take 1 tablet (10 mg total) by mouth daily, Disp: 90 tablet, Rfl: 3    Multiple Vitamin (multivitamin) tablet, Take 1 tablet by mouth daily, Disp: 90 tablet, Rfl: 3    multivitamin-minerals (CENTRUM ADULTS) tablet, Take 1 tablet by mouth daily, Disp: 30 tablet, Rfl: 0    Omega-3 Fatty Acids (FISH OIL) 1,000 mg, Take by mouth daily, Disp: , Rfl:     pantoprazole (PROTONIX) 40 mg tablet, Take 1 tablet (40 mg total) by mouth daily in the early morning, Disp: 30 tablet, Rfl: 0    predniSONE 10 mg tablet, Take 4 tablets (40 mg total) by mouth daily for 10 days Take 4 tablets (40 mg ) for one more day, then 30 mg once a day (3 tab) for 3 days, then 20 mg (2tab) for 3 days, then 10 mg once a day for 3 days, then stop , Disp: 22 tablet, Rfl: 0    senna-docusate sodium (SENOKOT S) 8 6-50 mg per tablet, Take 1 tablet by mouth daily at bedtime, Disp: 30 tablet, Rfl: 0    sertraline (ZOLOFT) 50 mg tablet, TAKE ONE TABLET BY MOUTH EVERY DAY, Disp: 90 tablet, Rfl: 3    tamsulosin (FLOMAX) 0 4 mg, Take 1 capsule (0 4 mg total) by mouth daily with dinner CORRECTED SCRIPT! Note change in SIG and QUANTITY  Please process accordingly  Thank you! (9/1/20), Disp: 90 capsule, Rfl: 1  No current facility-administered medications for this visit  Review of Systems   Constitutional: Positive for fatigue  Negative for appetite change, chills, fever and unexpected weight change  HENT: Negative for trouble swallowing  Eyes: Negative for visual disturbance  Respiratory: Positive for shortness of breath (minimal with SOB)  Negative for cough, chest tightness and wheezing  Cardiovascular: Negative for chest pain, palpitations and leg swelling  Gastrointestinal: Negative for abdominal distention, abdominal pain, blood in stool, constipation and diarrhea  Endocrine: Negative for polyuria  Genitourinary: Negative for difficulty urinating and flank pain  Musculoskeletal: Negative for arthralgias and myalgias  Skin: Negative for rash  Neurological: Negative for dizziness and light-headedness  Hematological: Negative for adenopathy  Does not bruise/bleed easily  Psychiatric/Behavioral: Negative for dysphoric mood and sleep disturbance  The patient is not nervous/anxious  Objective:      Pulse 65   SpO2 94% Comment: on 2L via NC         Physical Exam  Constitutional:       Appearance: He is well-developed        Comments: o2 in place via nasal cannula  He has lost weight   HENT:      Head: Normocephalic  Eyes:      Pupils: Pupils are equal, round, and reactive to light  Neck:      Musculoskeletal: Normal range of motion  Pulmonary:      Effort: Pulmonary effort is normal    Skin:     Findings: No rash  Neurological:      Mental Status: He is alert             Genny Huston MD

## 2021-01-22 NOTE — TELEPHONE ENCOUNTER
Spoke with patient this morning regarding set-up TCM , patient is asking for oxygen humidifier  I contact [facility] and they informed me at first to fax Rx to them then nurse call back and informed that Rx have to be fax to [facility]  Patient have to find out with his insurance which pharmacy he can use  [facility] is assisting patient with this  As soon we have more info we will fax order to the proper pharmacy

## 2021-01-25 ENCOUNTER — TELEMEDICINE (OUTPATIENT)
Dept: FAMILY MEDICINE CLINIC | Facility: CLINIC | Age: 71
End: 2021-01-25
Payer: MEDICARE

## 2021-01-25 VITALS — SYSTOLIC BLOOD PRESSURE: 126 MMHG | DIASTOLIC BLOOD PRESSURE: 72 MMHG | OXYGEN SATURATION: 96 % | HEART RATE: 72 BPM

## 2021-01-25 DIAGNOSIS — I10 ESSENTIAL HYPERTENSION: Chronic | ICD-10-CM

## 2021-01-25 DIAGNOSIS — C91.10 CLL (CHRONIC LYMPHOCYTIC LEUKEMIA) (HCC): Chronic | ICD-10-CM

## 2021-01-25 DIAGNOSIS — N13.8 BPH WITH OBSTRUCTION/LOWER URINARY TRACT SYMPTOMS: Chronic | ICD-10-CM

## 2021-01-25 DIAGNOSIS — N40.1 BPH WITH OBSTRUCTION/LOWER URINARY TRACT SYMPTOMS: Chronic | ICD-10-CM

## 2021-01-25 DIAGNOSIS — F32.A MILD DEPRESSION: Chronic | ICD-10-CM

## 2021-01-25 DIAGNOSIS — Z12.5 PROSTATE CANCER SCREENING: ICD-10-CM

## 2021-01-25 DIAGNOSIS — J96.01 ACUTE RESPIRATORY FAILURE WITH HYPOXIA (HCC): ICD-10-CM

## 2021-01-25 DIAGNOSIS — U07.1 COVID-19: Primary | ICD-10-CM

## 2021-01-25 PROCEDURE — 99213 OFFICE O/P EST LOW 20 MIN: CPT | Performed by: FAMILY MEDICINE

## 2021-01-25 NOTE — PROGRESS NOTES
COVID-19 Virtual Visit     Assessment/Plan:    Problem List Items Addressed This Visit        Respiratory    Acute respiratory failure with hypoxia (HCC)    Relevant Orders    CBC and differential       Cardiovascular and Mediastinum    Essential hypertension (Chronic)    Relevant Orders    CBC and differential    Comprehensive metabolic panel       Genitourinary    BPH with obstruction/lower urinary tract symptoms (Chronic)     Check PSA and follow with urology  Other    CLL (chronic lymphocytic leukemia) (HCC) (Chronic)    Relevant Orders    CBC and differential    Comprehensive metabolic panel    Mild depression (HCC) (Chronic)    Relevant Orders    CBC and differential    COVID-19 - Primary    Relevant Orders    CBC and differential    Comprehensive metabolic panel      Other Visit Diagnoses     Prostate cancer screening        Relevant Orders    PSA, Total Screen         Disposition:     The patient appears to be on the road to recovery  Fortunately, he has had no setbacks  He is breathing well even off of his oxygen but is using it for any type of exertion  We will continue with close follow-up visit is and he will contact me immediately with any worsening symptoms, in particular shortness of breath  I have spent 15 minutes directly with the patient  Greater than 50% of this time was spent in counseling/coordination of care regarding: impressions          Encounter provider Tom Hill MD    Provider located at [address] [place] [address] [place] [phone]    Recent Visits  Date Type Provider Dept   01/25/21 Telemedicine Tom Hill MD [facility]   [facility]   01/22/21 Telemedicine Tom Hill MD [facility]   Showing recent visits within past 7 days and meeting all other requirements     Future Appointments  No visits were found meeting these conditions  Showing future appointments within next 150 days and meeting all other requirements      This virtual check-in was done via [employer] and patient was informed that this is not a secure, HIPAA-compliant platform  He agrees to proceed  Patient agrees to participate in a virtual check in via telephone or video visit instead of presenting to the office to address urgent/immediate medical needs  Patient is aware this is a billable service  After connecting through [place], the patient was identified by name and date of birth  Radha Magdaleno was informed that this was a telemedicine visit and that the exam was being conducted confidentially over secure lines  My office door was closed  No one else was in the room  Radha Magdaleno acknowledged consent and understanding of privacy and security of the telemedicine visit  I informed the patient that I have reviewed his record in Epic and presented the opportunity for him to ask any questions regarding the visit today  The patient agreed to participate  Subjective:   Radha Magdaleno is a 78 y o  male who has been screened for COVID-19  Symptom change since last report: improving  Patient's symptoms include fatigue and shortness of breath (some SOB with exertion  )  Patient denies fever, chills, malaise, congestion, rhinorrhea, sore throat, anosmia, loss of taste, cough, chest tightness, abdominal pain, nausea, vomiting, diarrhea, myalgias and headaches  Pravin Rosenberg has been staying home and has isolated themselves in his home  He is taking care to not share personal items and is cleaning all surfaces that are touched often, like counters, tabletops, and doorknobs using household cleaning sprays or wipes  He is wearing a mask when he leaves his room  Date of symptom onset: 1/2/2021  Date of exposure: 12/30/2020  Date of positive COVID-19 PCR: 1/4/2021    Patient evaluated for a COVID follow-up  Fortunately, he continues to improve overall  He notes his fatigue is improving but is present  He gets mildly short of breath with exertion  He remains on oxygen at home and will be undergoing some physical therapy in the near future      Lab Results   Component Value Date    SARSCOV2 Detected (A) 01/04/2021     Past Medical History:   Diagnosis Date    Anxiety     BPH (benign prostatic hypertrophy)     CLL (chronic lymphocytic leukemia) ([facility] [address] )     2009    Colon polyp     Concussion     Resolved: 08/22/16    Depression     Gastrointestinal hemorrhage     Last assessed: 08/27/13    GERD (gastroesophageal reflux disease)     Hearing loss of aging     Hiatal hernia     History of transfusion     Hyperlipidemia     Hypertension     Iron deficiency anemia     Microscopic hematuria     Last assessed: 06/28/13    OA (osteoarthritis)     right hip    Pneumothorax     Prostatitis     Pulmonary hypertension ([facility] [address] )     Seasonal allergies     UTI (urinary tract infection)     in past    Wears glasses      Past Surgical History:   Procedure Laterality Date    COLONOSCOPY      CYSTOSCOPY  10/09/2014    Diagnostic    CYSTOSCOPY  06/29/2020    FRACTURE SURGERY      left lower arm    FRACTURE SURGERY      left femur    HERNIA REPAIR      JOINT REPLACEMENT Right     hip    OTHER SURGICAL HISTORY  03/2011    Spinal anesthesia epidural    [place] TOTAL HIP ARTHROPLASTY Right 9/29/2017    Procedure: ARTHROPLASTY HIP TOTAL ANTERIOR;  Surgeon: Aaron Vera MD;  Location: [facility];  Service: Orthopedics    TONSILLECTOMY AND ADENOIDECTOMY      TRANSURETHRAL RESECTION OF PROSTATE      x 2    WRIST SURGERY       Current Outpatient Medications   Medication Sig Dispense Refill    albuterol (PROVENTIL HFA,VENTOLIN HFA) 90 mcg/act inhaler Inhale 2 puffs every 4 (four) hours as needed for wheezing 1 Inhaler 0    amLODIPine (NORVASC) 5 mg tablet TAKE ONE TABLET BY MOUTH EVERY EVENING 90 tablet 3    apixaban (ELIQUIS) 2 5 mg Take 1 tablet (2 5 mg total) by mouth 2 (two) times a day 60 tablet 0    aspirin 81 MG tablet Take 1 tablet by mouth daily      atorvastatin (LIPITOR) 40 mg tablet Take 1 tablet (40 mg total) by mouth daily 30 tablet 0    dextromethorphan-guaiFENesin (ROBITUSSIN DM)  mg/5 mL syrup Take 5 mL by mouth 3 (three) times a day as needed for cough 120 mL 0    fluticasone (FLONASE) 50 mcg/act nasal spray 2 sprays into each nostril daily as needed for allergies 3 Bottle 3    Humidifiers MISC Use humidifier with oxygen therapy  1 each 0    LORazepam (ATIVAN) 0 5 mg tablet Take 1 tablet (0 5 mg total) by mouth daily at bedtime 15 tablet 0    metoprolol tartrate (LOPRESSOR) 25 mg tablet Take 1 tablet (25 mg total) by mouth every 12 (twelve) hours 60 tablet 0    montelukast (SINGULAIR) 10 mg tablet Take 1 tablet (10 mg total) by mouth daily 90 tablet 3    Multiple Vitamin (multivitamin) tablet Take 1 tablet by mouth daily 90 tablet 3    multivitamin-minerals (CENTRUM ADULTS) tablet Take 1 tablet by mouth daily 30 tablet 0    Omega-3 Fatty Acids (FISH OIL) 1,000 mg Take by mouth daily      pantoprazole (PROTONIX) 40 mg tablet Take 1 tablet (40 mg total) by mouth daily in the early morning 30 tablet 0    predniSONE 10 mg tablet Take 4 tablets (40 mg total) by mouth daily for 10 days Take 4 tablets (40 mg ) for one more day, then 30 mg once a day (3 tab) for 3 days, then 20 mg (2tab) for 3 days, then 10 mg once a day for 3 days, then stop  22 tablet 0    senna-docusate sodium (SENOKOT S) 8 6-50 mg per tablet Take 1 tablet by mouth daily at bedtime 30 tablet 0    sertraline (ZOLOFT) 50 mg tablet TAKE ONE TABLET BY MOUTH EVERY DAY 90 tablet 3    tamsulosin (FLOMAX) 0 4 mg Take 1 capsule (0 4 mg total) by mouth daily with dinner CORRECTED SCRIPT! Note change in SIG and QUANTITY  Please process accordingly    Thank you! (9/1/20) 90 capsule 1     No current facility-administered medications for this visit  Allergies   Allergen Reactions    Codeine Other (See Comments)     agitated    Sulfamethoxazole-Trimethoprim GI Intolerance and Abdominal Pain     upset stomach       Review of Systems   Constitutional: Positive for fatigue  Negative for chills and fever  HENT: Negative for congestion, rhinorrhea and sore throat  Respiratory: Positive for shortness of breath (some SOB with exertion  )  Negative for cough and chest tightness  Gastrointestinal: Negative for abdominal pain, diarrhea, nausea and vomiting  Musculoskeletal: Negative for myalgias  Neurological: Negative for headaches  Objective:    Vitals:    01/25/21 1616   BP: 126/72   Pulse: 72   SpO2: 96%       Physical Exam  Constitutional:       Appearance: He is well-developed  HENT:      Head: Normocephalic  Eyes:      Pupils: Pupils are equal, round, and reactive to light  Pulmonary:      Effort: Pulmonary effort is normal    Skin:     Findings: No rash  Neurological:      Mental Status: He is alert  VIRTUAL VISIT DISCLAIMER    Radha Magdaleno acknowledges that he has consented to an online visit or consultation  He understands that the online visit is based solely on information provided by him, and that, in the absence of a face-to-face physical evaluation by the physician, the diagnosis he receives is both limited and provisional in terms of accuracy and completeness  This is not intended to replace a full medical face-to-face evaluation by the physician  Radha Magdaleno understands and accepts these terms

## 2021-01-27 ENCOUNTER — APPOINTMENT (OUTPATIENT)
Dept: LAB | Age: 71
End: 2021-01-27
Payer: MEDICARE

## 2021-01-27 DIAGNOSIS — F32.A MILD DEPRESSION: ICD-10-CM

## 2021-01-27 DIAGNOSIS — U07.1 COVID-19: ICD-10-CM

## 2021-01-27 DIAGNOSIS — Z12.5 PROSTATE CANCER SCREENING: ICD-10-CM

## 2021-01-27 DIAGNOSIS — J96.01 ACUTE RESPIRATORY FAILURE WITH HYPOXIA (HCC): ICD-10-CM

## 2021-01-27 DIAGNOSIS — C91.10 CLL (CHRONIC LYMPHOCYTIC LEUKEMIA) (HCC): ICD-10-CM

## 2021-01-27 DIAGNOSIS — I10 ESSENTIAL HYPERTENSION: ICD-10-CM

## 2021-01-27 LAB
ALBUMIN SERPL BCP-MCNC: 3.8 G/DL (ref 3.5–5)
ALP SERPL-CCNC: 95 U/L (ref 46–116)
ALT SERPL W P-5'-P-CCNC: 108 U/L (ref 12–78)
ANION GAP SERPL CALCULATED.3IONS-SCNC: 7 MMOL/L (ref 4–13)
ANISOCYTOSIS BLD QL SMEAR: PRESENT
AST SERPL W P-5'-P-CCNC: 41 U/L (ref 5–45)
BASOPHILS # BLD MANUAL: 0 THOUSAND/UL (ref 0–0.1)
BASOPHILS NFR MAR MANUAL: 0 % (ref 0–1)
BILIRUB SERPL-MCNC: 0.84 MG/DL (ref 0.2–1)
BUN SERPL-MCNC: 12 MG/DL (ref 5–25)
CALCIUM SERPL-MCNC: 8.7 MG/DL (ref 8.3–10.1)
CHLORIDE SERPL-SCNC: 100 MMOL/L (ref 100–108)
CO2 SERPL-SCNC: 25 MMOL/L (ref 21–32)
CREAT SERPL-MCNC: 0.65 MG/DL (ref 0.6–1.3)
EOSINOPHIL # BLD MANUAL: 0 THOUSAND/UL (ref 0–0.4)
EOSINOPHIL NFR BLD MANUAL: 0 % (ref 0–6)
ERYTHROCYTE [DISTWIDTH] IN BLOOD BY AUTOMATED COUNT: 23 % (ref 11.6–15.1)
GFR SERPL CREATININE-BSD FRML MDRD: 100 ML/MIN/1.73SQ M
GLUCOSE SERPL-MCNC: 125 MG/DL (ref 65–140)
HCT VFR BLD AUTO: 34.9 % (ref 36.5–49.3)
HGB BLD-MCNC: 10.5 G/DL (ref 12–17)
LG PLATELETS BLD QL SMEAR: PRESENT
LYMPHOCYTES # BLD AUTO: 142.42 THOUSAND/UL (ref 0.6–4.47)
LYMPHOCYTES # BLD AUTO: 88 % (ref 14–44)
MACROCYTES BLD QL AUTO: PRESENT
MCH RBC QN AUTO: 31.9 PG (ref 26.8–34.3)
MCHC RBC AUTO-ENTMCNC: 30.1 G/DL (ref 31.4–37.4)
MCV RBC AUTO: 106 FL (ref 82–98)
MICROCYTES BLD QL AUTO: PRESENT
MONOCYTES # BLD AUTO: 0 THOUSAND/UL (ref 0–1.22)
MONOCYTES NFR BLD: 0 % (ref 4–12)
NEUTROPHILS # BLD MANUAL: 16.18 THOUSAND/UL (ref 1.85–7.62)
NEUTS SEG NFR BLD AUTO: 10 % (ref 43–75)
NRBC BLD AUTO-RTO: 0 /100 WBCS
PLATELET # BLD AUTO: 222 THOUSANDS/UL (ref 149–390)
PLATELET BLD QL SMEAR: ADEQUATE
PMV BLD AUTO: 10.7 FL (ref 8.9–12.7)
POLYCHROMASIA BLD QL SMEAR: PRESENT
POTASSIUM SERPL-SCNC: 4.9 MMOL/L (ref 3.5–5.3)
PROT SERPL-MCNC: 7.3 G/DL (ref 6.4–8.2)
PSA SERPL-MCNC: 0.4 NG/ML (ref 0–4)
RBC # BLD AUTO: 3.29 MILLION/UL (ref 3.88–5.62)
RBC MORPH BLD: PRESENT
SMUDGE CELLS BLD QL SMEAR: PRESENT
SODIUM SERPL-SCNC: 132 MMOL/L (ref 136–145)
TOTAL CELLS COUNTED SPEC: 100
VARIANT LYMPHS # BLD AUTO: 2 %
WBC # BLD AUTO: 161.84 THOUSAND/UL (ref 4.31–10.16)

## 2021-01-27 PROCEDURE — 36415 COLL VENOUS BLD VENIPUNCTURE: CPT

## 2021-01-27 PROCEDURE — 85007 BL SMEAR W/DIFF WBC COUNT: CPT

## 2021-01-27 PROCEDURE — G0103 PSA SCREENING: HCPCS

## 2021-01-27 PROCEDURE — 80053 COMPREHEN METABOLIC PANEL: CPT

## 2021-01-27 PROCEDURE — 85027 COMPLETE CBC AUTOMATED: CPT

## 2021-01-28 ENCOUNTER — TELEPHONE (OUTPATIENT)
Dept: FAMILY MEDICINE CLINIC | Facility: CLINIC | Age: 71
End: 2021-01-28

## 2021-01-28 NOTE — TELEPHONE ENCOUNTER
PATIENT CALL CONCERNED ABOUT THE WEATHER ON Monday  HAS AN APPOINTMENT WOULD LIKE TO KNOW IF HE CAN DO AS A VIRTUAL IF THE WEATHER IS TO BAD TO COME IN  MR VANNA SAID THAT IN LAST TWO DAYS HE DIDN'T HAVE TO USE HIS OXYGEN AND HE DID GOOD

## 2021-01-29 DIAGNOSIS — R30.0 DYSURIA: Primary | ICD-10-CM

## 2021-01-29 RX ORDER — CEPHALEXIN 500 MG/1
500 CAPSULE ORAL EVERY 8 HOURS SCHEDULED
Qty: 21 CAPSULE | Refills: 0 | Status: SHIPPED | OUTPATIENT
Start: 2021-01-29 | End: 2021-02-05

## 2021-01-29 NOTE — PROGRESS NOTES
Patient describes burning with voiding  He does self-catheterize in his high risk for a UTI  He just had COVID in cannot come in for a urinalysis, so I am going to place him on Keflex 500 t i d  for 7 days  If symptoms persist, he will need further evaluation

## 2021-02-01 ENCOUNTER — TELEMEDICINE (OUTPATIENT)
Dept: FAMILY MEDICINE CLINIC | Facility: CLINIC | Age: 71
End: 2021-02-01
Payer: MEDICARE

## 2021-02-01 VITALS — DIASTOLIC BLOOD PRESSURE: 73 MMHG | SYSTOLIC BLOOD PRESSURE: 124 MMHG | HEART RATE: 74 BPM | OXYGEN SATURATION: 97 %

## 2021-02-01 DIAGNOSIS — N40.1 BPH WITH OBSTRUCTION/LOWER URINARY TRACT SYMPTOMS: Chronic | ICD-10-CM

## 2021-02-01 DIAGNOSIS — C91.10 CLL (CHRONIC LYMPHOCYTIC LEUKEMIA) (HCC): Chronic | ICD-10-CM

## 2021-02-01 DIAGNOSIS — F32.A MILD DEPRESSION: Chronic | ICD-10-CM

## 2021-02-01 DIAGNOSIS — N13.8 BPH WITH OBSTRUCTION/LOWER URINARY TRACT SYMPTOMS: Chronic | ICD-10-CM

## 2021-02-01 DIAGNOSIS — T83.511A URINARY TRACT INFECTION ASSOCIATED WITH CATHETERIZATION OF URINARY TRACT, UNSPECIFIED INDWELLING URINARY CATHETER TYPE, INITIAL ENCOUNTER (HCC): ICD-10-CM

## 2021-02-01 DIAGNOSIS — M48.061 SPINAL STENOSIS OF LUMBAR REGION WITHOUT NEUROGENIC CLAUDICATION: Chronic | ICD-10-CM

## 2021-02-01 DIAGNOSIS — N39.0 URINARY TRACT INFECTION ASSOCIATED WITH CATHETERIZATION OF URINARY TRACT, UNSPECIFIED INDWELLING URINARY CATHETER TYPE, INITIAL ENCOUNTER (HCC): ICD-10-CM

## 2021-02-01 DIAGNOSIS — E78.00 HYPERCHOLESTEROLEMIA: Chronic | ICD-10-CM

## 2021-02-01 DIAGNOSIS — J96.01 ACUTE RESPIRATORY FAILURE WITH HYPOXIA (HCC): ICD-10-CM

## 2021-02-01 DIAGNOSIS — U07.1 COVID-19: Primary | ICD-10-CM

## 2021-02-01 PROCEDURE — 99214 OFFICE O/P EST MOD 30 MIN: CPT | Performed by: FAMILY MEDICINE

## 2021-02-01 NOTE — PROGRESS NOTES
COVID-19 Virtual Visit     Assessment/Plan:    Problem List Items Addressed This Visit        Respiratory    Acute respiratory failure with hypoxia ([facility] [address] )      He is off oxygen now x5 days  He seems to be doing quite well  He will be following with Pulmonary tomorrow  I will see him later in the week  Genitourinary    BPH with obstruction/lower urinary tract symptoms (Chronic)       He does appear to have UTI now but has responded quite well to cephalexin  I did not get a culture at the time as he was unable to come in due to his recent COVID illness  He will follow closely with Urology as he does require self catheterization  Intermittently            Other    CLL (chronic lymphocytic leukemia) ([facility] [address] ) (Chronic)       Continue close follow-up with Oncology  Spinal stenosis of lumbar region (Chronic)      Back pain issues are currently stable  Hypercholesterolemia (Chronic)       Continue current regimen  Mild depression (HCC) (Chronic)     Stable at this time  COVID-19 - Primary       He seems to be doing quite well  He is now off of oxygen for 5 days  He is moving about his apartment without significant issues  He will continue close follow-up with Pulmonary  I will see him also later in the week  His wife is still admitted at a SNF           Other Visit Diagnoses     Urinary tract infection associated with catheterization of urinary tract, unspecified indwelling urinary catheter type, initial encounter ([place] [address] )             Disposition:     I have spent 15 minutes directly with the patient          Encounter provider Genny Huston MD    Provider located at [address] [place] [id number]    Recent Visits  Date Type Provider Dept   01/28/21 Telephone Genny Huston MD [place] [id number] Primary Care   01/25/21 Telemedicine Genny Huston MD [facility] Primary Care   Showing recent visits within past 7 days and meeting all other requirements     Today's Visits  Date Type Provider Dept   02/01/21 Telemedicine Stephany Gambino MD [facility] Primary Care   Showing today's visits and meeting all other requirements     Future Appointments  No visits were found meeting these conditions  Showing future appointments within next 150 days and meeting all other requirements      This virtual check-in was done via [employer] and patient was informed that this is not a secure, HIPAA-compliant platform  He agrees to proceed  Patient agrees to participate in a virtual check in via telephone or video visit instead of presenting to the office to address urgent/immediate medical needs  Patient is aware this is a billable service  After connecting through [facility], the patient was identified by name and date of birth  Esau Casanova was informed that this was a telemedicine visit and that the exam was being conducted confidentially over secure lines  My office door was closed  No one else was in the room  Esau Casanova acknowledged consent and understanding of privacy and security of the telemedicine visit  I informed the patient that I have reviewed his record in Epic and presented the opportunity for him to ask any questions regarding the visit today  The patient agreed to participate  Subjective:   Esau Casanova is a 78 y o  male who has been screened for COVID-19  Symptom change since last report: resolving  Patient's symptoms include fatigue  Patient denies fever, chills, congestion, rhinorrhea, sore throat, anosmia, loss of taste, cough, shortness of breath, chest tightness, abdominal pain, nausea, vomiting, diarrhea, myalgias and headaches  Dain Riding has been staying home and has isolated themselves in his home   He is taking care to not share personal items and is cleaning all surfaces that are touched often, like counters, tabletops, and doorknobs using household cleaning sprays or wipes  He is wearing a mask when he leaves his room  Date of symptom onset: 1/2/2021  Date of exposure: 12/30/2020  Date of positive COVID-19 PCR: 1/4/2021    Stopped O2 as of 5 days ago  He is feeling relatively well  He denies excessive shortness of breath or coughing  He has history of hypertension and blood pressure readings have been excellent since his hospital discharge  He does remain on metoprolol which was added during his hospitalization  He also remains on anticoagulation  He will be finishing his prednisone taper today  He has no acute complaints today  He notes that his wife is doing better and should be coming home soon  He has history of CLL but follows routinely with Oncology for this  He has lost significant weight and has not been drinking alcohol since his admission for COVID    Lab Results   Component Value Date    SARSCOV2 Detected (A) 01/04/2021     Past Medical History:   Diagnosis Date    Anxiety     BPH (benign prostatic hypertrophy)     CLL (chronic lymphocytic [facility]) ([facility] [address] )     2009    Colon polyp     Concussion     Resolved: 08/22/16    Depression     Gastrointestinal hemorrhage     Last assessed: 08/27/13    GERD (gastroesophageal reflux disease)     Hearing loss of aging     Hiatal hernia     History of transfusion     Hyperlipidemia     Hypertension     Iron deficiency anemia     Microscopic hematuria     Last assessed: 06/28/13    OA (osteoarthritis)     right hip    Pneumothorax     Prostatitis     Pulmonary hypertension (HCC)     Seasonal allergies     UTI (urinary tract infection)     in past    Wears glasses      Past Surgical History:   Procedure Laterality Date    COLONOSCOPY      CYSTOSCOPY  10/09/2014    Diagnostic    CYSTOSCOPY  06/29/2020    FRACTURE SURGERY      left lower arm    FRACTURE SURGERY      left femur    HERNIA REPAIR      JOINT REPLACEMENT Right     hip    OTHER SURGICAL HISTORY  03/2011    Spinal anesthesia epidural    [place] TOTAL HIP ARTHROPLASTY Right 9/29/2017    Procedure: ARTHROPLASTY HIP TOTAL ANTERIOR;  Surgeon: Verenice Freitas MD;  Location: [facility];  Service: Orthopedics    TONSILLECTOMY AND ADENOIDECTOMY      TRANSURETHRAL RESECTION OF PROSTATE      x 2    WRIST SURGERY       Current Outpatient Medications   Medication Sig Dispense Refill    albuterol (PROVENTIL HFA,VENTOLIN HFA) 90 mcg/act inhaler Inhale 2 puffs every 4 (four) hours as needed for wheezing 1 Inhaler 0    amLODIPine (NORVASC) 5 mg tablet TAKE ONE TABLET BY MOUTH EVERY EVENING 90 tablet 3    apixaban (ELIQUIS) 2 5 mg Take 1 tablet (2 5 mg total) by mouth 2 (two) times a day 60 tablet 0    aspirin 81 MG tablet Take 1 tablet by mouth daily      atorvastatin (LIPITOR) 40 mg tablet Take 1 tablet (40 mg total) by mouth daily 30 tablet 0    cephalexin (KEFLEX) 500 mg capsule Take 1 capsule (500 mg total) by mouth every 8 (eight) hours for 7 days 21 capsule 0    dextromethorphan-guaiFENesin (ROBITUSSIN DM)  mg/5 mL syrup Take 5 mL by mouth 3 (three) times a day as needed for cough 120 mL 0    fluticasone (FLONASE) 50 mcg/act nasal spray 2 sprays into each nostril daily as needed for allergies 3 Bottle 3    Humidifiers MISC Use humidifier with oxygen therapy   1 each 0    LORazepam (ATIVAN) 0 5 mg tablet Take 1 tablet (0 5 mg total) by mouth daily at bedtime 15 tablet 0    metoprolol tartrate (LOPRESSOR) 25 mg tablet Take 1 tablet (25 mg total) by mouth every 12 (twelve) hours 60 tablet 0    montelukast (SINGULAIR) 10 mg tablet Take 1 tablet (10 mg total) by mouth daily 90 tablet 3    Multiple Vitamin (multivitamin) tablet Take 1 tablet by mouth daily 90 tablet 3    multivitamin-minerals (CENTRUM ADULTS) tablet Take 1 tablet by mouth daily 30 tablet 0    Omega-3 Fatty Acids (FISH OIL) 1,000 mg Take by mouth daily      pantoprazole (PROTONIX) 40 mg tablet Take 1 tablet (40 mg total) by mouth daily in the early morning 30 tablet 0    senna-docusate sodium (SENOKOT S) 8 6-50 mg per tablet Take 1 tablet by mouth daily at bedtime 30 tablet 0    sertraline (ZOLOFT) 50 mg tablet TAKE ONE TABLET BY MOUTH EVERY DAY 90 tablet 3    tamsulosin (FLOMAX) 0 4 mg Take 1 capsule (0 4 mg total) by mouth daily with dinner CORRECTED SCRIPT! Note change in SIG and QUANTITY  Please process accordingly  Thank you! (9/1/20) 90 capsule 1     No current facility-administered medications for this visit  Allergies   Allergen Reactions    Codeine Other (See Comments)     agitated    Sulfamethoxazole-Trimethoprim GI Intolerance and Abdominal Pain     upset stomach       Review of Systems   Constitutional: Positive for fatigue  Negative for chills and fever  HENT: Negative for congestion, rhinorrhea and sore throat  Respiratory: Negative for cough, chest tightness and shortness of breath  Cardiovascular: Negative for chest pain, palpitations and leg swelling  Gastrointestinal: Negative for abdominal pain, diarrhea, nausea and vomiting  Musculoskeletal: Negative for myalgias  Neurological: Negative for headaches  Objective:    Vitals:    02/01/21 1332   BP: 124/73   Pulse: 74   SpO2: 97%       Physical Exam  Constitutional:       Appearance: He is well-developed  HENT:      Head: Normocephalic  Eyes:      Pupils: Pupils are equal, round, and reactive to light  Pulmonary:      Effort: Pulmonary effort is normal  No respiratory distress  Skin:     Findings: No rash  Neurological:      Mental Status: He is alert  Cranial Nerves: No cranial nerve deficit  VIRTUAL VISIT DISCLAIMER    aRdha Magdaleno acknowledges that he has consented to an online visit or consultation   He understands that the online visit is based solely on information provided by him, and that, in the absence of a face-to-face physical evaluation by the physician, the diagnosis he receives is both limited and provisional in terms of accuracy and completeness  This is not intended to replace a full medical face-to-face evaluation by the physician  Destiny Lane understands and accepts these terms

## 2021-02-01 NOTE — ASSESSMENT & PLAN NOTE
He seems to be doing quite well  He is now off of oxygen for 5 days  He is moving about his apartment without significant issues  He will continue close follow-up with Pulmonary  I will see him also later in the week    His wife is still admitted at a SNF

## 2021-02-01 NOTE — ASSESSMENT & PLAN NOTE
He is off oxygen now x5 days  He seems to be doing quite well  He will be following with Pulmonary tomorrow  I will see him later in the week

## 2021-02-01 NOTE — ASSESSMENT & PLAN NOTE
He does appear to have UTI now but has responded quite well to cephalexin  I did not get a culture at the time as he was unable to come in due to his recent COVID illness    He will follow closely with Urology as he does require self catheterization  Intermittently

## 2021-02-02 ENCOUNTER — OFFICE VISIT (OUTPATIENT)
Dept: PULMONOLOGY | Facility: CLINIC | Age: 71
End: 2021-02-02
Payer: MEDICARE

## 2021-02-02 VITALS
SYSTOLIC BLOOD PRESSURE: 96 MMHG | BODY MASS INDEX: 26.07 KG/M2 | WEIGHT: 172 LBS | DIASTOLIC BLOOD PRESSURE: 63 MMHG | HEART RATE: 86 BPM | OXYGEN SATURATION: 96 % | HEIGHT: 68 IN

## 2021-02-02 DIAGNOSIS — J96.01 ACUTE RESPIRATORY FAILURE WITH HYPOXIA (HCC): ICD-10-CM

## 2021-02-02 DIAGNOSIS — U07.1 COVID-19: Primary | ICD-10-CM

## 2021-02-02 PROCEDURE — 99442 PR PHYS/QHP TELEPHONE EVALUATION 11-20 MIN: CPT | Performed by: INTERNAL MEDICINE

## 2021-02-02 NOTE — ASSESSMENT & PLAN NOTE
· This is now resolved and we will discontinue the home oxygen   · He has had steady improvement in his oxygen levels which have ranged from the low to mid 90s with exertion to close to 98% at rest

## 2021-02-02 NOTE — ASSESSMENT & PLAN NOTE
· Clinically improving with increased activity levels   · He did have significant interventions during the hospital stay included remdesivir, convalescent plasma, and Actemra  Treated with Decadron and antibiotics as well  · Currently remains on Eliquis due to elevated D-dimer at 2 5 mg b i d    · I have recommended that he obtain a repeat chest x-ray at the end of March which will be in 8 week follow-up chest x-ray  I have also ordered a test for his wife as well who was also hospitalized and sees me as a patient    · Will see both of them in follow-up after the x-rays are done

## 2021-02-02 NOTE — PROGRESS NOTES
Virtual Brief Visit    Assessment/Plan:    Problem List Items Addressed This Visit        Respiratory    Acute respiratory failure with hypoxia (HCC)     · This is now resolved and we will discontinue the home oxygen   · He has had steady improvement in his oxygen levels which have ranged from the low to mid 90s with exertion to close to 98% at rest          Relevant Orders    Discontinue home oxygen       Other    COVID-19 - Primary     · Clinically improving with increased activity levels   · He did have significant interventions during the hospital stay included remdesivir, convalescent plasma, and Actemra  Treated with Decadron and antibiotics as well  · Currently remains on Eliquis due to elevated D-dimer at 2 5 mg b i d    · I have recommended that he obtain a repeat chest x-ray at the end of March which will be in 8 week follow-up chest x-ray  I have also ordered a test for his wife as well who was also hospitalized and sees me as a patient  · Will see both of them in follow-up after the x-rays are done         Relevant Orders    Discontinue home oxygen    XR chest pa & lateral                Reason for visit is   Chief Complaint   Patient presents with   [address] Visit        Encounter provider Lurdes Stubbs MD    Provider located at [address] [id number]    Recent Visits  Date Type Provider Dept   02/01/21 Telemedicine Ruchi Porter MD [place] [address] Primary Care   01/28/21 Telephone Ruchi Porter MD [facility]   Showing recent visits within past 7 days and meeting all other requirements     Today's Visits  Date Type Provider Dept   02/02/21 Office Visit Lurdes Stubbs MD  Pulmonary [facility]   Showing today's visits and meeting all other requirements     Future Appointments  No visits were found meeting these conditions     Showing future appointments within next 150 days and meeting all other requirements        After connecting through telephone, the patient was identified by name and date of birth  Nuvia Blanco was informed that this is a telemedicine visit and that the visit is being conducted through telephone  My office door was closed  No one else was in the room  He acknowledged consent and understanding of privacy and security of the platform  The patient has agreed to participate and understands he can discontinue the visit at any time  Patient is aware this is a billable service  Subjective    Nuvia Blanco is a 78 y o  male recently hospitalized for COVID-19 infection  He was ultimately discharged home and has been doing well with maintained oxygen saturations  He has had some fatigue which has been gradually improving since the hospitalization  Both he and his wife for hospitalized at the same time at the [address] [place] [address]  They both had ICU stays  He did require mechanical ventilation and intubation  He was fortunate in that he was able to come off of the ventilator and is doing well  He has had steady improvement and currently is off oxygen and has not used it for close to a week now  His oxygen saturations he has been monitoring in remains in the mid 90s  He also has been anticoagulated on Eliquis and completed a prednisone taper  He reports no new symptoms  He continues to use an incentive spirometer  He has no chest pain  No cardiac complaints  He did have bradycardia during the hospital stay and has follow-up scheduled with Cardiology  No fever, chills, or persistent symptoms  He has resumed his pre-hospital level of activity  He has not had cough, phlegm production, or other respiratory symptoms  He denies any GERD symptoms or abdominal pain  No arthralgias or myalgias  He otherwise reports steady improvement in fatigue and his activity level has increased            Past Medical History: Diagnosis Date    Anxiety     BPH (benign prostatic hypertrophy)     CLL [facility]chronic lymphocytic leukemia) ([facility] [address] )     2009    Colon polyp     Concussion     Resolved: 08/22/16    Depression     Gastrointestinal hemorrhage     Last assessed: 08/27/13    GERD (gastroesophageal reflux disease)     Hearing loss of aging     Hiatal hernia     History of transfusion     Hyperlipidemia     Hypertension     Iron deficiency anemia     Microscopic hematuria     Last assessed: 06/28/13    OA (osteoarthritis)     right hip    Pneumothorax     Prostatitis     [facility]     Seasonal allergies     UTI (urinary tract infection)     in past    Wears glasses        Past Surgical History:   Procedure Laterality Date    COLONOSCOPY      CYSTOSCOPY  10/09/2014    Diagnostic    CYSTOSCOPY  06/29/2020    FRACTURE SURGERY      left lower arm    FRACTURE SURGERY      left femur    HERNIA REPAIR      JOINT REPLACEMENT Right     hip    OTHER SURGICAL HISTORY  03/2011    Spinal anesthesia epidural    ID TOTAL HIP ARTHROPLASTY Right 9/29/2017    Procedure: ARTHROPLASTY HIP TOTAL ANTERIOR;  Surgeon: Kailey Poole MD;  Location: [facility];  Service: Orthopedics    TONSILLECTOMY AND ADENOIDECTOMY      TRANSURETHRAL RESECTION OF PROSTATE      x 2    WRIST SURGERY         Current Outpatient Medications   Medication Sig Dispense Refill    albuterol (PROVENTIL HFA,VENTOLIN HFA) 90 mcg/act inhaler Inhale 2 puffs every 4 (four) hours as needed for wheezing 1 Inhaler 0    amLODIPine (NORVASC) 5 mg tablet TAKE ONE TABLET BY MOUTH EVERY EVENING 90 tablet 3    apixaban (ELIQUIS) 2 5 mg Take 1 tablet (2 5 mg total) by mouth 2 (two) times a day 60 tablet 0    aspirin 81 MG tablet Take 1 tablet by mouth daily      atorvastatin (LIPITOR) 40 mg tablet Take 1 tablet (40 mg total) by mouth daily 30 tablet 0    cephalexin (KEFLEX) 500 mg capsule Take 1 capsule (500 mg total) by mouth every 8 (eight) hours for 7 days 21 capsule 0    dextromethorphan-guaiFENesin (ROBITUSSIN DM)  mg/5 mL syrup Take 5 mL by mouth 3 (three) times a day as needed for cough 120 mL 0    fluticasone (FLONASE) 50 mcg/act nasal spray 2 sprays into each nostril daily as needed for allergies 3 Bottle 3    Humidifiers MISC Use humidifier with oxygen therapy  1 each 0    LORazepam (ATIVAN) 0 5 mg tablet Take 1 tablet (0 5 mg total) by mouth daily at bedtime 15 tablet 0    metoprolol tartrate (LOPRESSOR) 25 mg tablet Take 1 tablet (25 mg total) by mouth every 12 (twelve) hours 60 tablet 0    montelukast (SINGULAIR) 10 mg tablet Take 1 tablet (10 mg total) by mouth daily 90 tablet 3    Multiple Vitamin (multivitamin) tablet Take 1 tablet by mouth daily 90 tablet 3    multivitamin-minerals (CENTRUM ADULTS) tablet Take 1 tablet by mouth daily 30 tablet 0    Omega-3 Fatty Acids (FISH OIL) 1,000 mg Take by mouth daily      pantoprazole (PROTONIX) 40 mg tablet Take 1 tablet (40 mg total) by mouth daily in the early morning 30 tablet 0    senna-docusate sodium (SENOKOT S) 8 6-50 mg per tablet Take 1 tablet by mouth daily at bedtime 30 tablet 0    sertraline (ZOLOFT) 50 mg tablet TAKE ONE TABLET BY MOUTH EVERY DAY 90 tablet 3    tamsulosin (FLOMAX) 0 4 mg Take 1 capsule (0 4 mg total) by mouth daily with dinner CORRECTED SCRIPT! Note change in SIG and QUANTITY  Please process accordingly  Thank you! (9/1/20) 90 capsule 1     No current facility-administered medications for this visit  Allergies   Allergen Reactions    Codeine Other (See Comments)     agitated    Sulfamethoxazole-Trimethoprim GI Intolerance and Abdominal Pain     upset stomach     Aside from what is mentioned in the HPI, the review of systems is otherwise negative    Vitals:    02/02/21 0928   BP: 96/63   Pulse: 86   SpO2: 96%   Weight: 78 kg (172 lb)   Height: 5' 8" (1 727 m)     He is mildly anxious but otherwise sounds well on the phone    Speech normal  Does not appear dyspneic or have any increased respiratory rate  I personally reviewed his chest x-ray from the hospitalization  This was after intubation and shows diffuse bilateral opacities consistent with COVID-19 pneumonia  Hospital blood work was also reviewed  I spent 15 minutes directly with the patient during this visit    VIRTUAL VISIT [address] acknowledges that he has consented to an online visit or consultation  He understands that the online visit is based solely on information provided by him, and that, in the absence of a face-to-face physical evaluation by the physician, the diagnosis he receives is both limited and provisional in terms of accuracy and completeness  This is not intended to replace a full medical face-to-face evaluation by the physician  Khushbu Melendrez understands and accepts these terms

## 2021-02-03 ENCOUNTER — TELEPHONE (OUTPATIENT)
Dept: FAMILY MEDICINE CLINIC | Facility: CLINIC | Age: 71
End: 2021-02-03

## 2021-02-03 NOTE — TELEPHONE ENCOUNTER
Patient has a few questions:    1  He is almost done with his medications for the UTI and he still has burning  Anything else he can do? 2  He has been drinking cranberry juice almost exclusively because he thought it was good for urinary issues; but then he read that those on Eliquis should not drink cranberry   ? 3  Also, tomorrow is his last visit with the visiting nurse - did you want to end that order?     Please advise

## 2021-02-05 ENCOUNTER — OFFICE VISIT (OUTPATIENT)
Dept: FAMILY MEDICINE CLINIC | Facility: CLINIC | Age: 71
End: 2021-02-05
Payer: MEDICARE

## 2021-02-05 ENCOUNTER — OFFICE VISIT (OUTPATIENT)
Dept: UROLOGY | Facility: CLINIC | Age: 71
End: 2021-02-05
Payer: MEDICARE

## 2021-02-05 VITALS
HEIGHT: 68 IN | OXYGEN SATURATION: 96 % | WEIGHT: 175 LBS | TEMPERATURE: 97.2 F | HEART RATE: 89 BPM | DIASTOLIC BLOOD PRESSURE: 60 MMHG | RESPIRATION RATE: 18 BRPM | BODY MASS INDEX: 26.52 KG/M2 | SYSTOLIC BLOOD PRESSURE: 110 MMHG

## 2021-02-05 VITALS
WEIGHT: 175.2 LBS | HEART RATE: 78 BPM | DIASTOLIC BLOOD PRESSURE: 78 MMHG | SYSTOLIC BLOOD PRESSURE: 124 MMHG | HEIGHT: 68 IN | BODY MASS INDEX: 26.55 KG/M2

## 2021-02-05 DIAGNOSIS — N39.0 URINARY TRACT INFECTION ASSOCIATED WITH CATHETERIZATION OF URINARY TRACT, UNSPECIFIED INDWELLING URINARY CATHETER TYPE, SUBSEQUENT ENCOUNTER: ICD-10-CM

## 2021-02-05 DIAGNOSIS — U07.1 COVID-19: ICD-10-CM

## 2021-02-05 DIAGNOSIS — F32.A MILD DEPRESSION: Chronic | ICD-10-CM

## 2021-02-05 DIAGNOSIS — T83.511D URINARY TRACT INFECTION ASSOCIATED WITH CATHETERIZATION OF URINARY TRACT, UNSPECIFIED INDWELLING URINARY CATHETER TYPE, SUBSEQUENT ENCOUNTER: ICD-10-CM

## 2021-02-05 DIAGNOSIS — D50.9 IRON DEFICIENCY ANEMIA, UNSPECIFIED IRON DEFICIENCY ANEMIA TYPE: Chronic | ICD-10-CM

## 2021-02-05 DIAGNOSIS — N40.1 BPH WITH OBSTRUCTION/LOWER URINARY TRACT SYMPTOMS: Primary | Chronic | ICD-10-CM

## 2021-02-05 DIAGNOSIS — R73.9 HYPERGLYCEMIA: ICD-10-CM

## 2021-02-05 DIAGNOSIS — R74.01 TRANSAMINITIS: ICD-10-CM

## 2021-02-05 DIAGNOSIS — J96.01 ACUTE RESPIRATORY FAILURE WITH HYPOXIA (HCC): ICD-10-CM

## 2021-02-05 DIAGNOSIS — F51.01 PRIMARY INSOMNIA: ICD-10-CM

## 2021-02-05 DIAGNOSIS — C91.10 CLL (CHRONIC LYMPHOCYTIC LEUKEMIA) (HCC): Primary | Chronic | ICD-10-CM

## 2021-02-05 DIAGNOSIS — N13.8 BPH WITH OBSTRUCTION/LOWER URINARY TRACT SYMPTOMS: Primary | Chronic | ICD-10-CM

## 2021-02-05 DIAGNOSIS — E78.00 HYPERCHOLESTEROLEMIA: Chronic | ICD-10-CM

## 2021-02-05 DIAGNOSIS — I47.2 NSVT (NONSUSTAINED VENTRICULAR TACHYCARDIA) (HCC): ICD-10-CM

## 2021-02-05 PROBLEM — T83.511A URINARY TRACT INFECTION ASSOCIATED WITH CATHETERIZATION OF URINARY TRACT (HCC): Status: ACTIVE | Noted: 2021-02-05

## 2021-02-05 PROBLEM — F10.10 ALCOHOL ABUSE: Chronic | Status: RESOLVED | Noted: 2020-03-06 | Resolved: 2021-02-05

## 2021-02-05 LAB
SL AMB  POCT GLUCOSE, UA: NEGATIVE
SL AMB LEUKOCYTE ESTERASE,UA: ABNORMAL
SL AMB POCT BILIRUBIN,UA: NEGATIVE
SL AMB POCT BLOOD,UA: ABNORMAL
SL AMB POCT CLARITY,UA: CLEAR
SL AMB POCT COLOR,UA: YELLOW
SL AMB POCT KETONES,UA: NEGATIVE
SL AMB POCT NITRITE,UA: ABNORMAL
SL AMB POCT PH,UA: 6
SL AMB POCT SPECIFIC GRAVITY,UA: 1.01
SL AMB POCT URINE PROTEIN: 100
SL AMB POCT UROBILINOGEN: 0.2

## 2021-02-05 PROCEDURE — 99213 OFFICE O/P EST LOW 20 MIN: CPT | Performed by: UROLOGY

## 2021-02-05 PROCEDURE — 81002 URINALYSIS NONAUTO W/O SCOPE: CPT | Performed by: FAMILY MEDICINE

## 2021-02-05 PROCEDURE — 81002 URINALYSIS NONAUTO W/O SCOPE: CPT | Performed by: UROLOGY

## 2021-02-05 PROCEDURE — 99214 OFFICE O/P EST MOD 30 MIN: CPT | Performed by: FAMILY MEDICINE

## 2021-02-05 RX ORDER — LORAZEPAM 0.5 MG/1
0.5 TABLET ORAL
Qty: 15 TABLET | Refills: 0 | Status: SHIPPED | OUTPATIENT
Start: 2021-02-05 | End: 2021-02-26 | Stop reason: ALTCHOICE

## 2021-02-05 RX ORDER — MULTIVITAMIN/IRON/FOLIC ACID 18MG-0.4MG
1 TABLET ORAL DAILY
Qty: 30 TABLET | Refills: 0
Start: 2021-02-05 | End: 2021-02-17

## 2021-02-05 NOTE — PROGRESS NOTES
2/5/2021    Doneen Balloon  1950  [id number]        Assessment   history of BPH, status post TURP x2, bladder diverticulum, history of clean intermittent catheterization, routine prostate cancer screening      Discussion   I provided Kristopher Lynch with reassurance that his PSA is 0 4  He continues on the tamsulosin and does have spontaneous voids  He also performs clean intermittent catheterization twice daily  He feels that this helps to maintain his bladder empty  A likely etiology for an elevated PVR would be his moderate-sized bladder diverticula x2  He states that he recently had COVID and was in the ICU on a ventilator for 5 days  He feels that he has fully recovered  Digital rectal examination to be performed at his next office visit  History of Present Illness  78 y o  male with a history of  BPH  He is status post TURP x2 performed at an outside institution  Despite this, he continues to have incomplete bladder emptying  I last performed cystoscopy in June 2020 and found at the bladder neck and prostatic urethra were widely patent  He appears to have 2 sizable bladder diverticula and this may be the etiology for his incomplete bladder emptying  He continues on the tamsulosin  Recent PSA level 0 4  AUA Symptom Score      Review of Systems  Review of Systems   Constitutional: Negative  HENT: Negative  Eyes: Negative  Respiratory: Negative  Cardiovascular: Negative  Gastrointestinal: Negative  Endocrine: Negative  Genitourinary:        Per HPI   Musculoskeletal: Negative  Skin: Negative  Allergic/Immunologic: Negative  Neurological: Negative  Hematological: Negative  Psychiatric/Behavioral: Negative  All other systems reviewed and are negative          Past Medical History  Past Medical History:   Diagnosis Date    Anxiety     BPH (benign prostatic hypertrophy)     CLL (chronic lymphocytic leukemia) ([facility] [address] )     2009    Colon polyp     Concussion Resolved: 08/22/16    Depression     Gastrointestinal hemorrhage     Last assessed: 08/27/13    GERD (gastroesophageal reflux disease)     Hearing loss of aging     Hiatal hernia     History of transfusion     Hyperlipidemia     Hypertension     Iron deficiency anemia     Microscopic hematuria     Last assessed: 06/28/13    OA (osteoarthritis)     right hip    Pneumothorax     Prostatitis     Pulmonary hypertension (HCC)     Seasonal allergies     UTI (urinary tract infection)     in past    Wears glasses        Past Social History  Past Surgical History:   Procedure Laterality Date    COLONOSCOPY      CYSTOSCOPY  10/09/2014    Diagnostic    CYSTOSCOPY  06/29/2020    FRACTURE SURGERY      left lower arm    FRACTURE SURGERY      left femur    HERNIA REPAIR      JOINT REPLACEMENT Right     hip    OTHER SURGICAL HISTORY  03/2011    Spinal anesthesia epidural    RI TOTAL HIP ARTHROPLASTY Right 9/29/2017    Procedure: ARTHROPLASTY HIP TOTAL ANTERIOR;  Surgeon: Maurice Muro MD;  Location: [facility];  Service: Orthopedics    TONSILLECTOMY AND ADENOIDECTOMY      TRANSURETHRAL RESECTION OF PROSTATE      x 2    WRIST SURGERY         Past Family History  Family History   Problem Relation Age of Onset    No Known Problems Mother     Heart attack Father     Diabetes Brother     Prostate cancer Brother        Past Social history  Social History     Socioeconomic History    Marital status: /Civil Union     Spouse name: Not on file    Number of children: Not on file    Years of education: Not on file    Highest education level: Not on file   Occupational History    Not on file   Social Needs    Financial resource strain: Not on file    Food insecurity     Worry: Not on file     Inability: Not on file    Transportation needs     Medical: Not on file     Non-medical: Not on file   Tobacco Use    Smoking status: Former Smoker     Packs/day: 1 00     Years: 15 00     Pack years: 15 00     Types: Cigarettes     Start date: 67     Quit date: 1980     Years since quittin 4    Smokeless tobacco: Never Used   Substance and Sexual Activity    Alcohol use:  Yes     Alcohol/week: 10 0 standard drinks     Types: 10 Cans of beer per week     Frequency: 4 or more times a week     Drinks per session: 3 or 4    Drug use: Not Currently     Types: Marijuana    Sexual activity: Not Currently   Lifestyle    Physical activity     Days per week: Not on file     Minutes per session: Not on file    Stress: Not on file   Relationships    Social connections     Talks on phone: Not on file     Gets together: Not on file     Attends Mosque service: Not on file     Active member of club or organization: Not on file     Attends meetings of clubs or organizations: Not on file     Relationship status: Not on file    Intimate partner violence     Fear of current or ex partner: Not on file     Emotionally abused: Not on file     Physically abused: Not on file     Forced sexual activity: Not on file   Other Topics Concern    Not on file   Social History Narrative    Not on file       Current Medications  Current Outpatient Medications   Medication Sig Dispense Refill    amLODIPine (NORVASC) 5 mg tablet TAKE ONE TABLET BY MOUTH EVERY EVENING 90 tablet 3    apixaban (ELIQUIS) 2 5 mg Take 1 tablet (2 5 mg total) by mouth 2 (two) times a day 60 tablet 0    aspirin 81 MG tablet Take 1 tablet by mouth daily      atorvastatin (LIPITOR) 40 mg tablet Take 1 tablet (40 mg total) by mouth daily 30 tablet 0    cephalexin (KEFLEX) 500 mg capsule Take 1 capsule (500 mg total) by mouth every 8 (eight) hours for 7 days 21 capsule 0    fluticasone (FLONASE) 50 mcg/act nasal spray 2 sprays into each nostril daily as needed for allergies 3 Bottle 3    LORazepam (ATIVAN) 0 5 mg tablet Take 1 tablet (0 5 mg total) by mouth daily at bedtime 15 tablet 0    metoprolol tartrate (LOPRESSOR) 25 mg tablet Take 1 tablet (25 mg total) by mouth every 12 (twelve) hours 60 tablet 0    Multiple Vitamin (multivitamin) tablet Take 1 tablet by mouth daily 90 tablet 3    multivitamin-minerals (CENTRUM ADULTS) tablet Take 1 tablet by mouth daily 30 tablet 0    Omega-3 Fatty Acids (FISH OIL) 1,000 mg Take by mouth daily      pantoprazole (PROTONIX) 40 mg tablet Take 1 tablet (40 mg total) by mouth daily in the early morning 30 tablet 0    senna-docusate sodium (SENOKOT S) 8 6-50 mg per tablet Take 1 tablet by mouth daily at bedtime 30 tablet 0    sertraline (ZOLOFT) 50 mg tablet TAKE ONE TABLET BY MOUTH EVERY DAY 90 tablet 3    tamsulosin (FLOMAX) 0 4 mg Take 1 capsule (0 4 mg total) by mouth daily with dinner CORRECTED SCRIPT! Note change in SIG and QUANTITY  Please process accordingly  Thank you! (9/1/20) 90 capsule 1    montelukast (SINGULAIR) 10 mg tablet Take 1 tablet (10 mg total) by mouth daily 90 tablet 3     No current facility-administered medications for this visit  Allergies  Allergies   Allergen Reactions    Codeine Other (See Comments)     agitated    Sulfamethoxazole-Trimethoprim GI Intolerance and Abdominal Pain     upset stomach       Past Medical History, Social History, Family History, medications and allergies were reviewed  Vitals  Vitals:    02/05/21 1458   BP: 124/78   Pulse: 78   Weight: 79 5 kg (175 lb 3 2 oz)   Height: 5' 8" (1 727 m)       Physical Exam  Physical Exam      On examination is he is in no acute distress  Gait normal   Affect normal   He defers digital rectal examination until his next office visit      Results  Lab Results   Component Value Date    PSA 0 4 01/27/2021    PSA 0 2 01/30/2020    PSA 0 2 02/18/2019     Lab Results   Component Value Date    GLUCOSE 130 01/10/2021    CALCIUM 8 7 01/27/2021     (L) 07/20/2015    K 4 9 01/27/2021    CO2 25 01/27/2021     01/27/2021    BUN 12 01/27/2021    CREATININE 0 65 01/27/2021     Lab Results   Component Value Date    WBC 161 84 (HH) 01/27/2021    HGB 10 5 (L) 01/27/2021    HCT 34 9 (L) 01/27/2021     (H) 01/27/2021     01/27/2021         Office Urine Dip  Recent Results (from the past 1 hour(s))   POCT urine dip    Collection Time: 02/05/21  2:31 PM   Result Value Ref Range    LEUKOCYTE ESTERASE,UA large     NITRITE,UA postive     SL AMB POCT UROBILINOGEN 0 2     POCT URINE PROTEIN 100      PH,UA 6 0     BLOOD,UA small     SPECIFIC GRAVITY,UA 1 015     KETONES,UA negative     BILIRUBIN,UA negative     GLUCOSE, UA negative      COLOR,UA yellow     CLARITY,UA clear    ]      Total visit time was 15 minutes of which over 50% was spent on counseling

## 2021-02-05 NOTE — PROGRESS NOTES
Assessment/Plan:       Problem List Items Addressed This Visit        Respiratory    Acute respiratory failure with hypoxia (HCC)     He is now off of oxygen and seems to be doing extremely well  He had a relatively difficult COVID course but is fortunately on the rebound  Continue to monitor him clinically  Follow-up in March  Cardiovascular and Mediastinum    NSVT (nonsustained ventricular tachycardia) ([facility] [address] )     The patient does remain on metoprolol tartrate twice daily  Continue this for the time being  Genitourinary    Urinary tract infection associated with catheterization of urinary tract (HCC)     The patient had a symptomatic UTI but was unable to come in for urine culture due to his illness with COVID-19  I did place him on cephalexin which is ending today  He is still having some intermittent dysuria and significant discomfort when catheterizing himself  Urine dip today revealed large amount of leukocytes with positive nitrates  I will send off a urine culture  He is also seeing Urology this afternoon  Other    CLL (chronic lymphocytic leukemia) (HCC) - Primary (Chronic)    Iron deficiency anemia (Chronic)    Relevant Orders    CBC and differential    Hypercholesterolemia (Chronic)    Relevant Orders    Comprehensive metabolic panel    Mild depression (HCC) (Chronic)    Relevant Medications    LORazepam (ATIVAN) 0 5 mg tablet    Other Relevant Orders    CBC and differential    Insomnia    Relevant Medications    LORazepam (ATIVAN) 0 5 mg tablet    Hyperglycemia    Relevant Orders    Hemoglobin A1C    COVID-19    Relevant Medications    multivitamin-minerals (CENTRUM ADULTS) tablet    Transaminitis    Relevant Orders    CBC and differential    Comprehensive metabolic panel            Subjective:      Patient ID: Coleman Anderson is a 78 y o  male  HPI   The patient presents today for his 1st visit since his recent admission for COVID  He continues to improve    He is off oxygen now for a week  He does get fatigued with exertion but has no further severe shortness of breath  He has had no recurrence of any type of palpitations and denies any chest pain  He has had no fever or chills  He does remain on Eliquis at this time based on protocol  He used to drink several alcoholic beverages per day but has backed off on that significantly since his COVID illness  He does have a history of UTI and I placed him on cephalexin 6 days ago in light of dysuria  He also could not command as he was on isolation  He is having some persistent intermittent dysuria and increased pain with self catheterization        The following portions of the patient's history were reviewed and updated as appropriate: allergies, current medications, past family history, past medical history, past social history, past surgical history and problem list       Current Outpatient Medications:     amLODIPine (NORVASC) 5 mg tablet, TAKE ONE TABLET BY MOUTH EVERY EVENING, Disp: 90 tablet, Rfl: 3    apixaban (ELIQUIS) 2 5 mg, Take 1 tablet (2 5 mg total) by mouth 2 (two) times a day, Disp: 60 tablet, Rfl: 0    aspirin 81 MG tablet, Take 1 tablet by mouth daily, Disp: , Rfl:     atorvastatin (LIPITOR) 40 mg tablet, Take 1 tablet (40 mg total) by mouth daily, Disp: 30 tablet, Rfl: 0    cephalexin (KEFLEX) 500 mg capsule, Take 1 capsule (500 mg total) by mouth every 8 (eight) hours for 7 days, Disp: 21 capsule, Rfl: 0    fluticasone (FLONASE) 50 mcg/act nasal spray, 2 sprays into each nostril daily as needed for allergies, Disp: 3 Bottle, Rfl: 3    LORazepam (ATIVAN) 0 5 mg tablet, Take 1 tablet (0 5 mg total) by mouth daily at bedtime, Disp: 15 tablet, Rfl: 0    metoprolol tartrate (LOPRESSOR) 25 mg tablet, Take 1 tablet (25 mg total) by mouth every 12 (twelve) hours, Disp: 60 tablet, Rfl: 0    Multiple Vitamin (multivitamin) tablet, Take 1 tablet by mouth daily, Disp: 90 tablet, Rfl: 3    Omega-3 Fatty Acids (FISH OIL) 1,000 mg, Take by mouth daily, Disp: , Rfl:     senna-docusate sodium (SENOKOT S) 8 6-50 mg per tablet, Take 1 tablet by mouth daily at bedtime, Disp: 30 tablet, Rfl: 0    sertraline (ZOLOFT) 50 mg tablet, TAKE ONE TABLET BY MOUTH EVERY DAY, Disp: 90 tablet, Rfl: 3    tamsulosin (FLOMAX) 0 4 mg, Take 1 capsule (0 4 mg total) by mouth daily with dinner CORRECTED SCRIPT! Note change in SIG and QUANTITY  Please process accordingly  Thank you! (9/1/20), Disp: 90 capsule, Rfl: 1    montelukast (SINGULAIR) 10 mg tablet, Take 1 tablet (10 mg total) by mouth daily, Disp: 90 tablet, Rfl: 3    multivitamin-minerals (CENTRUM ADULTS) tablet, Take 1 tablet by mouth daily, Disp: 30 tablet, Rfl: 0    pantoprazole (PROTONIX) 40 mg tablet, Take 1 tablet (40 mg total) by mouth daily in the early morning, Disp: 30 tablet, Rfl: 0     Review of Systems      Objective:      /60 (BP Location: Left arm, Patient Position: Sitting, Cuff Size: Adult)   Pulse 89   Temp (!) 97 2 °F (36 2 °C) (Temporal)   Resp 18   Ht 5' 8" (1 727 m)   Wt 79 4 kg (175 lb)   SpO2 96%   BMI 26 61 kg/m²          Physical Exam  Constitutional:       General: He is not in acute distress  Appearance: He is well-developed  He is not diaphoretic  HENT:      Head: Normocephalic  Eyes:      General:         Right eye: No discharge  Left eye: No discharge  Pupils: Pupils are equal, round, and reactive to light  Neck:      Thyroid: No thyromegaly  Trachea: No tracheal deviation  Cardiovascular:      Rate and Rhythm: Normal rate and regular rhythm  Heart sounds: Normal heart sounds  No murmur  Pulmonary:      Effort: Pulmonary effort is normal  No respiratory distress  Breath sounds: No wheezing or rales  Abdominal:      General: There is no distension  Palpations: Abdomen is soft  Tenderness: There is no abdominal tenderness  Musculoskeletal: Normal range of motion  Lymphadenopathy:      Cervical: No cervical adenopathy  Skin:     General: Skin is warm  Findings: No erythema or rash  Comments: He has significant ecchymoses with a hematoma of his right lower abdomen  Neurological:      Mental Status: He is alert and oriented to person, place, and time  Cranial Nerves: No cranial nerve deficit  Psychiatric:         Thought Content:  Thought content normal          Judgment: Judgment normal            Felix Vasquez MD

## 2021-02-05 NOTE — ASSESSMENT & PLAN NOTE
He is now off of oxygen and seems to be doing extremely well  He had a relatively difficult COVID course but is fortunately on the rebound  Continue to monitor him clinically  Follow-up in March  No indicators present

## 2021-02-05 NOTE — ASSESSMENT & PLAN NOTE
The patient had a symptomatic UTI but was unable to come in for urine culture due to his illness with COVID-19  I did place him on cephalexin which is ending today  He is still having some intermittent dysuria and significant discomfort when catheterizing himself  Urine dip today revealed large amount of leukocytes with positive nitrates  I will send off a urine culture  He is also seeing Urology this afternoon

## 2021-02-12 DIAGNOSIS — U07.1 COVID-19: ICD-10-CM

## 2021-02-12 NOTE — TELEPHONE ENCOUNTER
Also, patient wants to confirm that he should NOT re [facility] a refill of the Eliquis, that you would like him to d/c once he is out of the pills

## 2021-02-12 NOTE — TELEPHONE ENCOUNTER
Patient called numerous times today to try to reach Dr Rachel Soares  He states as per his patient message that he is very backed up, his rectum hurts, despite stool softener and metamucil  Per his message, he had to pull out some that was impacted  Per patient, he has increased stool softener to 3 pills   Please advise further

## 2021-02-17 DIAGNOSIS — N13.8 BPH WITH OBSTRUCTION/LOWER URINARY TRACT SYMPTOMS: ICD-10-CM

## 2021-02-17 DIAGNOSIS — K21.9 GASTROESOPHAGEAL REFLUX DISEASE: ICD-10-CM

## 2021-02-17 DIAGNOSIS — N40.1 BPH WITH OBSTRUCTION/LOWER URINARY TRACT SYMPTOMS: ICD-10-CM

## 2021-02-17 RX ORDER — TAMSULOSIN HYDROCHLORIDE 0.4 MG/1
0.4 CAPSULE ORAL
Qty: 90 CAPSULE | Refills: 1 | Status: SHIPPED | OUTPATIENT
Start: 2021-02-17 | End: 2021-08-30

## 2021-02-17 RX ORDER — OMEPRAZOLE 40 MG/1
CAPSULE, DELAYED RELEASE ORAL
Qty: 90 CAPSULE | Refills: 3 | Status: SHIPPED | OUTPATIENT
Start: 2021-02-17 | End: 2022-03-23 | Stop reason: SDUPTHER

## 2021-02-17 NOTE — TELEPHONE ENCOUNTER
Patient called in regard of medication needs refills for omeprazole 40 mg and Flomax 0 4 mg capsules  Please review  Patient is also asking if he can keep taking miralex  Has been taking it for 7 days and is worried about constipation if he stop it  Direction on medication says to stop in 7 days   Would like to know if he can take metamucil instead of the miralex   If the doctor states to stop miralex

## 2021-02-19 NOTE — TELEPHONE ENCOUNTER
Called pt and informed him on medication, patient is upset in regards of his wife medication  He stated nobody got back to him in regards of this and stated when he comes to see the doctor on the 17 th he will bring it up to his attention  He feels like every time he sends a message the doctor need to get back to him asap  He was highly upset and need to speak with someone

## 2021-02-22 ENCOUNTER — APPOINTMENT (OUTPATIENT)
Dept: RADIOLOGY | Facility: MEDICAL CENTER | Age: 71
End: 2021-02-22
Payer: MEDICARE

## 2021-02-22 DIAGNOSIS — U07.1 COVID-19: ICD-10-CM

## 2021-02-22 PROCEDURE — 71046 X-RAY EXAM CHEST 2 VIEWS: CPT

## 2021-02-26 ENCOUNTER — OFFICE VISIT (OUTPATIENT)
Dept: PULMONOLOGY | Facility: CLINIC | Age: 71
End: 2021-02-26
Payer: MEDICARE

## 2021-02-26 VITALS
TEMPERATURE: 96 F | HEART RATE: 68 BPM | HEIGHT: 68 IN | DIASTOLIC BLOOD PRESSURE: 66 MMHG | SYSTOLIC BLOOD PRESSURE: 118 MMHG | WEIGHT: 180.8 LBS | BODY MASS INDEX: 27.4 KG/M2 | RESPIRATION RATE: 18 BRPM | OXYGEN SATURATION: 97 %

## 2021-02-26 DIAGNOSIS — G47.33 OSA (OBSTRUCTIVE SLEEP APNEA): Chronic | ICD-10-CM

## 2021-02-26 DIAGNOSIS — I27.20 PULMONARY HYPERTENSION (HCC): Primary | Chronic | ICD-10-CM

## 2021-02-26 DIAGNOSIS — U07.1 COVID-19: ICD-10-CM

## 2021-02-26 PROBLEM — J96.01 ACUTE RESPIRATORY FAILURE WITH HYPOXIA (HCC): Status: RESOLVED | Noted: 2021-01-13 | Resolved: 2021-02-26

## 2021-02-26 PROCEDURE — 99214 OFFICE O/P EST MOD 30 MIN: CPT | Performed by: INTERNAL MEDICINE

## 2021-02-26 RX ORDER — ATORVASTATIN CALCIUM 20 MG/1
TABLET, FILM COATED ORAL
COMMUNITY
Start: 2021-02-17 | End: 2021-08-30

## 2021-02-26 NOTE — PROGRESS NOTES
Progress note - Pulmonary Medicine   Esau Casanova 78 y o  male MRN: [id number]       Impression & Plan:     COVID-19  · Infection resolved  · Had severe presentation requiring hospitalization, intubation, and mechanical ventilation  Discharged on supplemental oxygen  · Now off supplemental oxygen   · Repeat chest x-ray clear   · No longstanding sequela of primary infection    DIXIE not currently treated  ·  Has declined therapy for sleep apnea       Pulmonary hypertension ([address] )  · Continues to have elevated pulmonary pressures  Most recent echocardiogram January 21 performed during hospitalization for COVID-19 infection showed an estimated pulmonary artery pressure of 75 mmHg   · He has no symptoms of cor pulmonale  No shortness of breath with exertion  No leg swelling  No abdominal bloating  · His symptoms have always been discordant with his pulmonary pressures by echocardiography  · Given the absence of symptoms, no indication to start primary pulmonary hypertension therapy     I will see Drippler Riding back in 3 months for evaluation  ______________________________________________________________________    HPI:    Esau Casanova presents today for follow-up of  COVID-19 pneumonia  He has been doing well since a severe hospitalization  He is off supplemental oxygen and doing well  No residual cough, shortness of breath, chest tightness, or chest pain  He has nothing but trophic things to say about his hospitalization despite severe illness and ICU level care  He continues not to have any symptoms of pulmonary hypertension  No leg swelling, abdominal bloating, chest pain, or other symptoms  He has not shown any signs or symptoms of congestive heart failure  He does have obstructive sleep apnea  He has declined therapy in the past   He does not have significant symptoms of hypersomnolence or other findings to suggest sleep difficulty  Otherwise he has been doing well    He has no longstanding sequela of the primary COVID-19 infection  Current Medications:    Current Outpatient Medications:     amLODIPine (NORVASC) 5 mg tablet, TAKE ONE TABLET BY MOUTH EVERY EVENING, Disp: 90 tablet, Rfl: 3    aspirin 81 MG tablet, Take 1 tablet by mouth daily, Disp: , Rfl:     atorvastatin (LIPITOR) 40 mg tablet, Take 1 tablet (40 mg total) by mouth daily, Disp: 30 tablet, Rfl: 0    metoprolol tartrate (LOPRESSOR) 25 mg tablet, Take 1 tablet (25 mg total) by mouth every 12 (twelve) hours, Disp: 60 tablet, Rfl: 5    Multiple Vitamin (multivitamin) tablet, Take 1 tablet by mouth daily, Disp: 90 tablet, Rfl: 3    Omega-3 Fatty Acids (FISH OIL) 1,000 mg, Take by mouth daily, Disp: , Rfl:     omeprazole (PriLOSEC) 40 MG capsule, TAKE ONE CAPSULE BY MOUTH ONCE EVERY DAY, Disp: 90 capsule, Rfl: 3    senna-docusate sodium (SENOKOT S) 8 6-50 mg per tablet, Take 1 tablet by mouth daily at bedtime, Disp: 30 tablet, Rfl: 0    sertraline (ZOLOFT) 50 mg tablet, TAKE ONE TABLET BY MOUTH EVERY DAY, Disp: 90 tablet, Rfl: 3    tamsulosin (FLOMAX) 0 4 mg, Take 1 capsule (0 4 mg total) by mouth daily with dinner CORRECTED SCRIPT! Note change in SIG and QUANTITY  Please process accordingly    Thank you! (20), Disp: 90 capsule, Rfl: 1    atorvastatin (LIPITOR) 20 mg tablet, TAKE ONE TABLET BY MOUTH ONCE EVERY DAY, Disp: , Rfl:     Review of Systems:  Aside from what is mentioned in the HPI, the review of systems is otherwise negative    Past medical history, surgical history, and family history were reviewed and updated as appropriate    Social history updates:  Social History     Tobacco Use   Smoking Status Former Smoker    Packs/day: 1 00    Years: 15 00    Pack years: 15 00    Types: Cigarettes    Start date:     Quit date: 1980    Years since quittin 5   Smokeless Tobacco Never Used       PhysicalExamination:  Vitals:   /66   Pulse 68   Temp (!) 96 °F (35 6 °C)   Resp 18   Ht 5' 8" (1 727 m)   Wt 82 kg (180 lb 12 8 oz)   SpO2 97%   BMI 27 49 kg/m²     Gen: Comfortable  Non-labored  HEENT: PERRL  O/P: clear  moist  Neck: Trachea is midline  No JVD  No adenopathy  Chest:   Symmetric chest wall excursion  Slightly limited  Breath sounds are clear bilaterally  Cardiac:  regular  no murmur  Abdomen: Soft and nontender  Bowel sounds are present  Extremities: No edema  Neuro:  Nonfocal     Diagnostic Data:  Labs: I personally reviewed the most recent laboratory data pertinent to today's visit    Lab Results   Component Value Date     84 (HH) 01/27/2021    HGB 10 5 (L) 01/27/2021    HCT 34 9 (L) 01/27/2021     (H) 01/27/2021     01/27/2021     Lab Results   Component Value Date    SODIUM 132 (L) 01/27/2021    K 4 9 01/27/2021    CO2 25 01/27/2021     01/27/2021    BUN 12 01/27/2021    CREATININE 0 65 01/27/2021    CALCIUM 8 7 01/27/2021       Imaging:  I personally reviewed the images on the [facility] system pertinent to today's visit    Chest x-ray shows complete resolution of pulmonary infiltrates      Other studies:    Echocardiogram from January 21 during the hospitalization  Was reviewed  And is as mentioned above in the HPI    Jazlyn Feliciano MD

## 2021-02-26 NOTE — ASSESSMENT & PLAN NOTE
· Infection resolved  · Had severe presentation requiring hospitalization, intubation, and mechanical ventilation    Discharged on supplemental oxygen  · Now off supplemental oxygen   · Repeat chest x-ray clear   · No longstanding sequela of primary infection

## 2021-02-26 NOTE — ASSESSMENT & PLAN NOTE
· Continues to have elevated pulmonary pressures  Most recent echocardiogram January 21 performed during hospitalization for COVID-19 infection showed an estimated pulmonary artery pressure of 75 mmHg   · He has no symptoms of cor pulmonale  No shortness of breath with exertion  No leg swelling  No abdominal bloating  · His symptoms have always been discordant with his pulmonary pressures by echocardiography    · Given the absence of symptoms, no indication to start primary pulmonary hypertension therapy

## 2021-03-10 ENCOUNTER — TELEPHONE (OUTPATIENT)
Dept: UROLOGY | Facility: MEDICAL CENTER | Age: 71
End: 2021-03-10

## 2021-03-10 DIAGNOSIS — N30.00 ACUTE CYSTITIS WITHOUT HEMATURIA: ICD-10-CM

## 2021-03-10 DIAGNOSIS — R39.9 UTI SYMPTOMS: Primary | ICD-10-CM

## 2021-03-10 DIAGNOSIS — Z23 ENCOUNTER FOR IMMUNIZATION: ICD-10-CM

## 2021-03-10 NOTE — TELEPHONE ENCOUNTER
Patient of [facility] office  History of BPH, status post TURP x2, bladder diverticulum, history of clean intermittent catheterization, routine prostate cancer screening  Patient called with complaints of burning, urgency, frequency  Denies fever, chills, hematuria, flank pain  Orders placed for UA C&S to rule out urinary tract infection  Office will follow up as results become available usually within 48-72 hours  Patient encouraged to hydrate well with water and avoid bladder irritants  Patient instructed to call back with worsening symptoms, fever, chills, blood in the urine or difficulty urinating

## 2021-03-10 NOTE — TELEPHONE ENCOUNTER
Patient of Dr Joyce Nugent seen in [address]  Patient requesting lab order for ua  Patient is having burning and urgency  Patient will be going to lab in am and is requesting order to be put in

## 2021-03-11 ENCOUNTER — APPOINTMENT (OUTPATIENT)
Dept: LAB | Facility: MEDICAL CENTER | Age: 71
End: 2021-03-11
Payer: MEDICARE

## 2021-03-11 ENCOUNTER — TELEPHONE (OUTPATIENT)
Dept: CARDIOLOGY CLINIC | Facility: CLINIC | Age: 71
End: 2021-03-11

## 2021-03-11 ENCOUNTER — OFFICE VISIT (OUTPATIENT)
Dept: CARDIOLOGY CLINIC | Facility: CLINIC | Age: 71
End: 2021-03-11
Payer: MEDICARE

## 2021-03-11 VITALS
SYSTOLIC BLOOD PRESSURE: 136 MMHG | DIASTOLIC BLOOD PRESSURE: 70 MMHG | HEART RATE: 66 BPM | WEIGHT: 184.8 LBS | BODY MASS INDEX: 28.01 KG/M2 | HEIGHT: 68 IN | TEMPERATURE: 97.9 F

## 2021-03-11 DIAGNOSIS — R39.9 UTI SYMPTOMS: ICD-10-CM

## 2021-03-11 DIAGNOSIS — F32.A MILD DEPRESSION: ICD-10-CM

## 2021-03-11 DIAGNOSIS — I10 ESSENTIAL HYPERTENSION: Primary | Chronic | ICD-10-CM

## 2021-03-11 DIAGNOSIS — E78.00 HYPERCHOLESTEROLEMIA: Chronic | ICD-10-CM

## 2021-03-11 DIAGNOSIS — I47.2 NSVT (NONSUSTAINED VENTRICULAR TACHYCARDIA) (HCC): ICD-10-CM

## 2021-03-11 DIAGNOSIS — R73.9 HYPERGLYCEMIA: ICD-10-CM

## 2021-03-11 DIAGNOSIS — D50.9 IRON DEFICIENCY ANEMIA, UNSPECIFIED IRON DEFICIENCY ANEMIA TYPE: ICD-10-CM

## 2021-03-11 DIAGNOSIS — R74.01 TRANSAMINITIS: ICD-10-CM

## 2021-03-11 LAB
ALBUMIN SERPL BCP-MCNC: 4.1 G/DL (ref 3.5–5)
ALP SERPL-CCNC: 99 U/L (ref 46–116)
ALT SERPL W P-5'-P-CCNC: 31 U/L (ref 12–78)
ANION GAP SERPL CALCULATED.3IONS-SCNC: 3 MMOL/L (ref 4–13)
ANISOCYTOSIS BLD QL SMEAR: PRESENT
AST SERPL W P-5'-P-CCNC: 15 U/L (ref 5–45)
BACTERIA UR QL AUTO: ABNORMAL /HPF
BASOPHILS # BLD MANUAL: 0 THOUSAND/UL (ref 0–0.1)
BASOPHILS NFR MAR MANUAL: 0 % (ref 0–1)
BILIRUB SERPL-MCNC: 0.51 MG/DL (ref 0.2–1)
BILIRUB UR QL STRIP: NEGATIVE
BUN SERPL-MCNC: 16 MG/DL (ref 5–25)
CALCIUM SERPL-MCNC: 9.2 MG/DL (ref 8.3–10.1)
CHLORIDE SERPL-SCNC: 106 MMOL/L (ref 100–108)
CLARITY UR: ABNORMAL
CO2 SERPL-SCNC: 29 MMOL/L (ref 21–32)
COLOR UR: YELLOW
CREAT SERPL-MCNC: 0.74 MG/DL (ref 0.6–1.3)
EOSINOPHIL # BLD MANUAL: 0.25 THOUSAND/UL (ref 0–0.4)
EOSINOPHIL NFR BLD MANUAL: 1 % (ref 0–6)
ERYTHROCYTE [DISTWIDTH] IN BLOOD BY AUTOMATED COUNT: 16.1 % (ref 11.6–15.1)
EST. AVERAGE GLUCOSE BLD GHB EST-MCNC: 100 MG/DL
GFR SERPL CREATININE-BSD FRML MDRD: 95 ML/MIN/1.73SQ M
GLUCOSE P FAST SERPL-MCNC: 104 MG/DL (ref 65–99)
GLUCOSE UR STRIP-MCNC: NEGATIVE MG/DL
HBA1C MFR BLD: 5.1 %
HCT VFR BLD AUTO: 32 % (ref 36.5–49.3)
HGB BLD-MCNC: 10.4 G/DL (ref 12–17)
HGB UR QL STRIP.AUTO: ABNORMAL
KETONES UR STRIP-MCNC: NEGATIVE MG/DL
LEUKOCYTE ESTERASE UR QL STRIP: ABNORMAL
LYMPHOCYTES # BLD AUTO: 20.91 THOUSAND/UL (ref 0.6–4.47)
LYMPHOCYTES # BLD AUTO: 85 % (ref 14–44)
MACROCYTES BLD QL AUTO: PRESENT
MCH RBC QN AUTO: 33.2 PG (ref 26.8–34.3)
MCHC RBC AUTO-ENTMCNC: 32.5 G/DL (ref 31.4–37.4)
MCV RBC AUTO: 102 FL (ref 82–98)
MONOCYTES # BLD AUTO: 0.49 THOUSAND/UL (ref 0–1.22)
MONOCYTES NFR BLD: 2 % (ref 4–12)
NEUTROPHILS # BLD MANUAL: 2.95 THOUSAND/UL (ref 1.85–7.62)
NEUTS BAND NFR BLD MANUAL: 1 % (ref 0–8)
NEUTS SEG NFR BLD AUTO: 11 % (ref 43–75)
NITRITE UR QL STRIP: POSITIVE
NON-SQ EPI CELLS URNS QL MICRO: ABNORMAL /HPF
NRBC BLD AUTO-RTO: 0 /100 WBCS
OVALOCYTES BLD QL SMEAR: PRESENT
PH UR STRIP.AUTO: 6.5 [PH]
PLATELET # BLD AUTO: 262 THOUSANDS/UL (ref 149–390)
PLATELET BLD QL SMEAR: ADEQUATE
PMV BLD AUTO: 10.3 FL (ref 8.9–12.7)
POIKILOCYTOSIS BLD QL SMEAR: PRESENT
POTASSIUM SERPL-SCNC: 4.4 MMOL/L (ref 3.5–5.3)
PROT SERPL-MCNC: 7.9 G/DL (ref 6.4–8.2)
PROT UR STRIP-MCNC: ABNORMAL MG/DL
RBC # BLD AUTO: 3.13 MILLION/UL (ref 3.88–5.62)
RBC #/AREA URNS AUTO: ABNORMAL /HPF
SODIUM SERPL-SCNC: 138 MMOL/L (ref 136–145)
SP GR UR STRIP.AUTO: 1.01 (ref 1–1.03)
TOTAL CELLS COUNTED SPEC: 100
UROBILINOGEN UR QL STRIP.AUTO: 0.2 E.U./DL
WBC # BLD AUTO: 24.6 THOUSAND/UL (ref 4.31–10.16)
WBC #/AREA URNS AUTO: ABNORMAL /HPF

## 2021-03-11 PROCEDURE — 93000 ELECTROCARDIOGRAM COMPLETE: CPT | Performed by: INTERNAL MEDICINE

## 2021-03-11 PROCEDURE — 83036 HEMOGLOBIN GLYCOSYLATED A1C: CPT

## 2021-03-11 PROCEDURE — 80053 COMPREHEN METABOLIC PANEL: CPT

## 2021-03-11 PROCEDURE — 87077 CULTURE AEROBIC IDENTIFY: CPT

## 2021-03-11 PROCEDURE — 99214 OFFICE O/P EST MOD 30 MIN: CPT | Performed by: INTERNAL MEDICINE

## 2021-03-11 PROCEDURE — 85007 BL SMEAR W/DIFF WBC COUNT: CPT

## 2021-03-11 PROCEDURE — 87186 SC STD MICRODIL/AGAR DIL: CPT

## 2021-03-11 PROCEDURE — 81001 URINALYSIS AUTO W/SCOPE: CPT

## 2021-03-11 PROCEDURE — 36415 COLL VENOUS BLD VENIPUNCTURE: CPT

## 2021-03-11 PROCEDURE — 85027 COMPLETE CBC AUTOMATED: CPT

## 2021-03-11 PROCEDURE — 87086 URINE CULTURE/COLONY COUNT: CPT

## 2021-03-11 NOTE — PROGRESS NOTES
Cardiology   MD Kee Lyman MD Ather Mansoor, MD Alisia Needle, DO, Wendy Ly DO, Robyn Almonte DO, [facility] - [place]  -------------------------------------------------------------------  [facility], [address] [id number] [phone]                        Blair Guevara                     1950                     [id number]          Assessment/Plan:    1  QT prolongation:  Without symptoms of palpitations, without clinical sequelae, now history of torsades  Prior potassium level normal   Would recommend routine magnesium level once or twice a year  Have written down this condition for him on paper, and have discussed with him the importance of avoiding QT prolonging medications in the future  2  Nonsustained ventricular tachycardia:  As noted during prior hospitalization 01/2021 at time of COVID-19 pneumonia  Continue beta-blocker as tolerated  NO symptoms of angina  3  Hypertension: Blood pressure controlled, continue amlodipine,  Metoprolol  4  Pulmonary hypertension:  Fortunately, without symptoms  Continue following up with Pulmonary Medicine  No plans for treatment as he is asymptomatic      Follow up in 1 year      Interval History:      this is a 72-year-old male with hypertension who has to be on minoxidil in the past   He also has a history of prolonged QT interval, and has been seeing Dr Hernesto Awad in the past    He has also been following Dr Brendon Duran a pulmonary medicine for pulmonary hypertension and sleep apnea  He was hospitalized with severe COVID-19 pneumonia 01/2021 at which time our group was consulted due to bradycardia and short runs of nonsustained ventricular tachycardia  He was maintained on metoprolol and QT prolonging agents were avoided  He presents today for follow-up with no complaints    He denies exertional dyspnea, chest discomfort, palpitations, dizziness, lower extremity edema  Vitals:  Vitals:    21 1446   BP: 136/70   Pulse: 66   Temp: 97 9 °F (36 6 °C)   Weight: 83 8 kg (184 lb 12 8 oz)   Height: 5' 8" (1 727 m)         Past Medical History:   Diagnosis Date    Anxiety     BPH (benign prostatic hypertrophy)     Cancer (HCC)     CLL    [facility]         Colon polyp     Concussion     Resolved: 16    Depression     Gastrointestinal hemorrhage     Last assessed: 13    GERD (gastroesophageal reflux disease)     Hearing loss of aging     Hiatal hernia     History of transfusion     Hyperlipidemia     Hypertension     Iron deficiency anemia     Microscopic hematuria     Last assessed: 13    OA (osteoarthritis)     right hip    Pneumothorax     Prostatitis     Pulmonary hypertension (HCC)     Seasonal allergies     UTI (urinary tract infection)     in past    Wears glasses      Social History     Socioeconomic History    Marital status: /Civil Union     Spouse name: Not on file    Number of children: Not on file    Years of education: Not on file    Highest education level: Not on file   Occupational History    Not on file   Social Needs    Financial resource strain: Not on file    Food insecurity     Worry: Not on file     Inability: Not on file   MD-IT needs     Medical: Not on file     Non-medical: Not on file   Tobacco Use    Smoking status: Former Smoker     Packs/day: 1 00     Years: 15 00     Pack years: 15 00     Types: Cigarettes     Start date:      Quit date: 1980     Years since quittin 5    Smokeless tobacco: Never Used   Substance and Sexual Activity    Alcohol use:  Yes     Alcohol/week: 10 0 standard drinks     Types: 10 Cans of beer per week     Frequency: 4 or more times a week     Drinks per session: 3 or 4    Drug use: Not Currently     Types: Marijuana    Sexual activity: Not Currently   Lifestyle    Physical activity Days per week: Not on file     Minutes per session: Not on file    Stress: Not on file   Relationships    Social connections     Talks on phone: Not on file     Gets together: Not on file     Attends Sikhism service: Not on file     Active member of club or organization: Not on file     Attends meetings of clubs or organizations: Not on file     Relationship status: Not on file    Intimate partner violence     Fear of current or ex partner: Not on file     Emotionally abused: Not on file     Physically abused: Not on file     Forced sexual activity: Not on file   Other Topics Concern    Not on file   Social History Narrative    Not on file      Family History   Problem Relation Age of Onset    No Known Problems Mother     Heart attack Father     Diabetes Brother     Prostate cancer Brother      Past Surgical History:   Procedure Laterality Date    COLONOSCOPY      CYSTOSCOPY  10/09/2014    Diagnostic    CYSTOSCOPY  06/29/2020    FRACTURE SURGERY      left lower arm    FRACTURE SURGERY      left femur    HERNIA REPAIR      JOINT REPLACEMENT Right     hip    OTHER SURGICAL HISTORY  03/2011    Spinal anesthesia epidural    KS TOTAL HIP ARTHROPLASTY Right 9/29/2017    Procedure: ARTHROPLASTY HIP TOTAL ANTERIOR;  Surgeon: Honorio Crabtree MD;  Location: [facility];  Service: Orthopedics    TONSILLECTOMY AND ADENOIDECTOMY      TRANSURETHRAL RESECTION OF PROSTATE      x 2    WRIST SURGERY         Current Outpatient Medications:     amLODIPine (NORVASC) 5 mg tablet, TAKE ONE TABLET BY MOUTH EVERY EVENING, Disp: 90 tablet, Rfl: 3    aspirin 81 MG tablet, Take 1 tablet by mouth daily, Disp: , Rfl:     metoprolol tartrate (LOPRESSOR) 25 mg tablet, Take 1 tablet (25 mg total) by mouth every 12 (twelve) hours, Disp: 60 tablet, Rfl: 5    Multiple Vitamin (multivitamin) tablet, Take 1 tablet by mouth daily, Disp: 90 tablet, Rfl: 3    Omega-3 Fatty Acids (FISH OIL) 1,000 mg, Take by mouth daily, Disp: , Rfl:     omeprazole (PriLOSEC) 40 MG capsule, TAKE ONE CAPSULE BY MOUTH ONCE EVERY DAY, Disp: 90 capsule, Rfl: 3    senna-docusate sodium (SENOKOT S) 8 6-50 mg per tablet, Take 1 tablet by mouth daily at bedtime, Disp: 30 tablet, Rfl: 0    sertraline (ZOLOFT) 50 mg tablet, TAKE ONE TABLET BY MOUTH EVERY DAY, Disp: 90 tablet, Rfl: 3    tamsulosin (FLOMAX) 0 4 mg, Take 1 capsule (0 4 mg total) by mouth daily with dinner CORRECTED SCRIPT! Note change in SIG and QUANTITY  Please process accordingly  Thank you! (9/1/20), Disp: 90 capsule, Rfl: 1    atorvastatin (LIPITOR) 20 mg tablet, TAKE ONE TABLET BY MOUTH ONCE EVERY DAY, Disp: , Rfl:     atorvastatin (LIPITOR) 40 mg tablet, Take 1 tablet (40 mg total) by mouth daily, Disp: 30 tablet, Rfl: 0        Review of Systems:  Review of Systems   Respiratory: Negative  Cardiovascular: Negative  All other systems reviewed and are negative  Physical Exam:  Physical Exam  Constitutional:       General: He is not in acute distress  Appearance: He is well-developed  He is not diaphoretic  HENT:      Head: Normocephalic and atraumatic  Eyes:      General: No scleral icterus  Right eye: No discharge  Pupils: Pupils are equal, round, and reactive to light  Neck:      Musculoskeletal: Normal range of motion and neck supple  Thyroid: No thyromegaly  Cardiovascular:      Rate and Rhythm: Normal rate and regular rhythm  Heart sounds: Normal heart sounds  No murmur  No friction rub  No gallop  Pulmonary:      Effort: Pulmonary effort is normal       Breath sounds: Normal breath sounds  Abdominal:      General: There is no distension  Tenderness: There is no abdominal tenderness  There is no guarding or rebound  Musculoskeletal: Normal range of motion  Skin:     General: Skin is warm and dry  Coloration: Skin is not pale  Findings: No erythema or rash     Neurological:      Mental Status: He is alert and oriented to person, place, and time  Coordination: Coordination normal    Psychiatric:         Behavior: Behavior normal          Thought Content: Thought content normal          Judgment: Judgment normal          This note was completed in part utilizing M-Modal Fluency Direct Software  Grammatical errors, random word insertions, spelling mistakes, and incomplete sentences can be an occasional consequence of this system secondary to software limitations, ambient noise, and hardware issues  If you have any questions or concerns about the content, text, or information contained within the body of this dictation, please contact the provider for clarification

## 2021-03-11 NOTE — TELEPHONE ENCOUNTER
Phone call from patient who had an appointment today and was to call with strength of Lipitor  He stating takes Lipitor 20 mgs not the 40 mgs

## 2021-03-12 RX ORDER — CEFIXIME 400 MG/1
400 CAPSULE ORAL DAILY
Qty: 5 CAPSULE | Refills: 0 | Status: SHIPPED | OUTPATIENT
Start: 2021-03-12 | End: 2021-03-15 | Stop reason: ALTCHOICE

## 2021-03-12 NOTE — TELEPHONE ENCOUNTER
Spoke with patient in depth regarding his current symptoms, UA results and preliminary urine culture results  Patient reports currently no fever, reports burning has mostly resolved  Patient does performs CIC 2x day  Patient reports reservations with starting Suprax now, given he currently doesn't have any true symptoms  Discussed with patient chronic colonization that can become common with patients who have chronic wolfe catheters or perform CIC  At this time, patient wishes to hold off on antibiotics until final urine culture results are back  Patient is requesting call on Monday with final results  Patient knows if symptoms worsen, to start antibiotics in the meantime and contact office or proceed to the ER  Will monitor for final culture results and contact patient on Monday

## 2021-03-12 NOTE — TELEPHONE ENCOUNTER
Patient called the office once again, frustrated that he has yet to receive a call back from the clinical staff  He didn't understand why an antibiotic couldn't be put into his pharmacy, as he saw that his urine results came in  I explained to him that although the UA results were finalized, the UC results were still preliminary, so we are waiting for those results so that we can prescribe the best possible antibiotic for him  I assured him that WE would be the ones to contact him regarding this, and that he doesn't have to call our office back before that

## 2021-03-12 NOTE — TELEPHONE ENCOUNTER
Please call patient and let him know I apologize for delay in prescribing him the medication  I was hoping for his urine culture with sensitivity to come back  Because it is Friday and getting closer to the end of the day and I do not have the sensitivities back I will send a prescription to his pharmacy on file  If I need to change his prescription because it will not cover his current infection we will call him  And let him know    If he does not hear from us the antibiotic prescribed will cover his current infection

## 2021-03-12 NOTE — TELEPHONE ENCOUNTER
This is a very highly resistant infection and the antibiotics And I  and waiting for sensitivities    If I do not have the by the end of the day we will call in an antibiotic to his pharmacy however it may need to be changed

## 2021-03-12 NOTE — TELEPHONE ENCOUNTER
Patient called stating he saw his results on my chart this morning and he wants a call back   He was very upset stating no one has called him back  He has burning when urinating  He states he needs antibiotics      Patient can be reached at [phone] (Z)

## 2021-03-13 LAB — BACTERIA UR CULT: ABNORMAL

## 2021-03-15 NOTE — TELEPHONE ENCOUNTER
Spoke with patient, advised, per provider, no need for antibiotics at this time  Patient instructed to contact office if he develops symptoms  Spoke with [place] at [facility], cancelled script for Suprax

## 2021-03-15 NOTE — TELEPHONE ENCOUNTER
Urine culture results final  Attempted to contact patient, per his request  No answer  Left message for patient to return call to discuss final culture results and if he is currently experiencing any symptoms

## 2021-03-15 NOTE — TELEPHONE ENCOUNTER
Just confirming if patient symptomatic or asymptomatic? Culture grew pseudomonas   cipro or levaquin would be best, happy to prescribe wherever he likes

## 2021-03-15 NOTE — TELEPHONE ENCOUNTER
Spoke with patient  Patient reports doing well, denies any further symptoms  Advised patient of urine culture results  Patient questions need for antibiotics, given he currently has no symptoms  Advised, will discuss with provider on their thoughts for antibiotics, given asymptomatic  Patient reports, Sobia Edwards had sent the Suprax script to [facility], as they do not stock Suprax  Patient requests office contact pharmacy to cancel order if antibiotics are not needed  Patient requests call back after discussion with provider  Patient will be unavailable between 10-11 this morning

## 2021-03-17 ENCOUNTER — OFFICE VISIT (OUTPATIENT)
Dept: FAMILY MEDICINE CLINIC | Facility: CLINIC | Age: 71
End: 2021-03-17
Payer: MEDICARE

## 2021-03-17 VITALS
SYSTOLIC BLOOD PRESSURE: 128 MMHG | WEIGHT: 189.2 LBS | DIASTOLIC BLOOD PRESSURE: 76 MMHG | BODY MASS INDEX: 28.67 KG/M2 | OXYGEN SATURATION: 97 % | HEART RATE: 81 BPM | HEIGHT: 68 IN | RESPIRATION RATE: 12 BRPM

## 2021-03-17 DIAGNOSIS — I27.20 PULMONARY HYPERTENSION (HCC): ICD-10-CM

## 2021-03-17 DIAGNOSIS — R73.9 HYPERGLYCEMIA: ICD-10-CM

## 2021-03-17 DIAGNOSIS — R00.1 BRADYCARDIA: ICD-10-CM

## 2021-03-17 DIAGNOSIS — F32.A MILD DEPRESSION: Chronic | ICD-10-CM

## 2021-03-17 DIAGNOSIS — N40.0 ENLARGED PROSTATE WITHOUT LOWER URINARY TRACT SYMPTOMS (LUTS): ICD-10-CM

## 2021-03-17 DIAGNOSIS — U07.1 COVID-19: ICD-10-CM

## 2021-03-17 DIAGNOSIS — I47.2 NSVT (NONSUSTAINED VENTRICULAR TACHYCARDIA) (HCC): ICD-10-CM

## 2021-03-17 DIAGNOSIS — G47.33 OSA (OBSTRUCTIVE SLEEP APNEA): Chronic | ICD-10-CM

## 2021-03-17 DIAGNOSIS — R94.31 QT PROLONGATION: ICD-10-CM

## 2021-03-17 DIAGNOSIS — C91.10 CLL (CHRONIC LYMPHOCYTIC LEUKEMIA) (HCC): Chronic | ICD-10-CM

## 2021-03-17 DIAGNOSIS — T83.511D URINARY TRACT INFECTION ASSOCIATED WITH CATHETERIZATION OF URINARY TRACT, UNSPECIFIED INDWELLING URINARY CATHETER TYPE, SUBSEQUENT ENCOUNTER: ICD-10-CM

## 2021-03-17 DIAGNOSIS — I49.5 SINUS BRADY-TACHY SYNDROME (HCC): ICD-10-CM

## 2021-03-17 DIAGNOSIS — N39.0 URINARY TRACT INFECTION ASSOCIATED WITH CATHETERIZATION OF URINARY TRACT, UNSPECIFIED INDWELLING URINARY CATHETER TYPE, SUBSEQUENT ENCOUNTER: ICD-10-CM

## 2021-03-17 DIAGNOSIS — I10 ESSENTIAL HYPERTENSION: Primary | ICD-10-CM

## 2021-03-17 DIAGNOSIS — K21.9 GASTROESOPHAGEAL REFLUX DISEASE WITHOUT ESOPHAGITIS: Chronic | ICD-10-CM

## 2021-03-17 DIAGNOSIS — E78.00 HYPERCHOLESTEROLEMIA: ICD-10-CM

## 2021-03-17 DIAGNOSIS — D50.9 IRON DEFICIENCY ANEMIA, UNSPECIFIED IRON DEFICIENCY ANEMIA TYPE: Chronic | ICD-10-CM

## 2021-03-17 PROCEDURE — 99214 OFFICE O/P EST MOD 30 MIN: CPT | Performed by: FAMILY MEDICINE

## 2021-03-17 NOTE — ASSESSMENT & PLAN NOTE
Breathing seems stable at this time 
Continue monitoring of CBC  Continue routine follow-up with Hematology 
He was placed on metoprolol while hospitalized which will continue for now  He also will be seeing Cardiology  We will help to expedite this 
He was prescribed omeprazole upon discharge from the hospital which he may continue  He had previously been on Protonix which he can initiate once omeprazole runs out  He does seem to require chronic PPI therapy 
Stable at this time    Continue Urology follow-up
Stable with current therapy  This will need to monitored closely 
The patient has not drank since his COVID illness in early January  I did give him some lorazepam to help him sleep and told him that he may not drink if taking this medication 
The patient looks well on his virtual visit today  He is oxygenating in the mid 90s consistently even with some moderate ambulation  He will continue with his anticoagulation for a full month  I did rescind his steroid taper as prescribed by Pulmonary/Critical Care  He is having some significant issues with sleep since being extubated and I did refill lorazepam   He notes he is not drinking at this time  He will require close follow-up with myself as well as Pulmonary and Cardiology  Blood pressure should be checked at least daily at home and he agrees to notify me if it is running above 140/90 
White counts were exceedingly high when hospitalized for COVID-19    Continue routine hematology follow-up
Initiate Treatment: Hydrocortisone 2.5% cream twice daily as needed x1-2 weeks for flares on the torso
Detail Level: Zone
Render In Strict Bullet Format?: No

## 2021-03-17 NOTE — PROGRESS NOTES
Assessment/Plan:       Problem List Items Addressed This Visit        Digestive    Esophageal reflux (Chronic)     Stable at this time  Continue PPI  Respiratory    DIXIE not currently treated (Chronic)       Cardiovascular and Mediastinum    Essential hypertension - Primary (Chronic)    Relevant Medications    metoprolol tartrate (LOPRESSOR) 25 mg tablet    Other Relevant Orders    Comprehensive metabolic panel    Lipid Panel with Direct LDL reflex    Pulmonary hypertension (HCC) (Chronic)    Relevant Orders    Comprehensive metabolic panel    Lipid Panel with Direct LDL reflex    Sinus los-tachy syndrome (HCC)    Relevant Medications    metoprolol tartrate (LOPRESSOR) 25 mg tablet    NSVT (nonsustained ventricular tachycardia) (HCC)    Relevant Medications    metoprolol tartrate (LOPRESSOR) 25 mg tablet       Genitourinary    Urinary tract infection associated with catheterization of urinary tract (HCC)       Other    CLL (chronic lymphocytic leukemia) (HCC) (Chronic)    Relevant Orders    Comprehensive metabolic panel    Iron deficiency anemia (Chronic)    Hypercholesterolemia (Chronic)    Relevant Orders    Comprehensive metabolic panel    Lipid Panel with Direct LDL reflex    Mild depression (HCC) (Chronic)    Relevant Orders    Comprehensive metabolic panel    TSH, 3rd generation with Free T4 reflex    Enlarged prostate without lower urinary tract symptoms (luts)     Symptoms stable at this time  Hyperglycemia    Relevant Orders    Comprehensive metabolic panel    BIPLY-12    Relevant Medications    metoprolol tartrate (LOPRESSOR) 25 mg tablet    QT prolongation    Relevant Orders    TSH, 3rd generation with Free T4 reflex    Bradycardia    Relevant Orders    TSH, 3rd generation with Free T4 reflex      Other Visit Diagnoses     BMI 29 0-29 9,adult              BMI Counseling: Body mass index is 28 77 kg/m²   The BMI is above normal  Nutrition recommendations include encouraging healthy choices of fruits and vegetables  Exercise recommendations include moderate physical activity 150 minutes/week  Depression Screening and Follow-up Plan: Patient advised to follow-up with PCP for further management  Subjective:      Patient ID: Joyce Meehan is a 78 y o  male  HPI patient presents today for follow-up for chronic health issues  He feels well  He had a very complicated COVID illness but has fortunately seem to reached almost a full recovery  He still has some persistent fatigue intermittently  He denies any persistent shortness of breath or chest tightness  He has a history of prolonged QT as well as ventricular tachycardia noted during his hospitalization  Fortunately, he denies any chest pain, palpitations, lightheadedness or syncope  He has history of depressed mood but feels his mood is very good these days  He is quite thankful to have gotten through [facility]  He has history of reflux and remains on PPI without heartburn or dysphagia  BPH remains stable  He does have to self cath himself and recently was noted to have Pseudomonas in the urine, but urology has elected to hold off on treatment at this time      The following portions of the patient's history were reviewed and updated as appropriate: allergies, current medications, past family history, past medical history, past social history, past surgical history and problem list       Current Outpatient Medications:     amLODIPine (NORVASC) 5 mg tablet, TAKE ONE TABLET BY MOUTH EVERY EVENING, Disp: 90 tablet, Rfl: 3    aspirin 81 MG tablet, Take 1 tablet by mouth daily, Disp: , Rfl:     atorvastatin (LIPITOR) 20 mg tablet, TAKE ONE TABLET BY MOUTH ONCE EVERY DAY, Disp: , Rfl:     metoprolol tartrate (LOPRESSOR) 25 mg tablet, Take 1 tablet (25 mg total) by mouth every 12 (twelve) hours, Disp: 180 tablet, Rfl: 3    Multiple Vitamin (multivitamin) tablet, Take 1 tablet by mouth daily, Disp: 90 tablet, Rfl: 3    Omega-3 Fatty Acids (FISH OIL) 1,000 mg, Take by mouth daily, Disp: , Rfl:     omeprazole (PriLOSEC) 40 MG capsule, TAKE ONE CAPSULE BY MOUTH ONCE EVERY DAY, Disp: 90 capsule, Rfl: 3    senna-docusate sodium (SENOKOT S) 8 6-50 mg per tablet, Take 1 tablet by mouth daily at bedtime, Disp: 30 tablet, Rfl: 0    sertraline (ZOLOFT) 50 mg tablet, TAKE ONE TABLET BY MOUTH EVERY DAY, Disp: 90 tablet, Rfl: 3    tamsulosin (FLOMAX) 0 4 mg, Take 1 capsule (0 4 mg total) by mouth daily with dinner CORRECTED SCRIPT! Note change in SIG and QUANTITY  Please process accordingly  Thank you! (9/1/20), Disp: 90 capsule, Rfl: 1     Review of Systems   Constitutional: Negative for appetite change, chills, fatigue, fever and unexpected weight change  HENT: Negative for trouble swallowing  Eyes: Negative for visual disturbance  Respiratory: Negative for cough, chest tightness, shortness of breath and wheezing  Cardiovascular: Negative for chest pain, palpitations and leg swelling  Gastrointestinal: Negative for abdominal distention, abdominal pain, blood in stool, constipation and diarrhea  Endocrine: Negative for polyuria  Genitourinary: Negative for difficulty urinating and flank pain  Musculoskeletal: Negative for arthralgias and myalgias  Skin: Negative for rash  Neurological: Negative for dizziness and light-headedness  Hematological: Negative for adenopathy  Does not bruise/bleed easily  Psychiatric/Behavioral: Negative for dysphoric mood and sleep disturbance  The patient is not nervous/anxious  Objective:      /76 (BP Location: Left arm, Patient Position: Sitting, Cuff Size: Standard)   Pulse 81   Resp 12   Ht 5' 8" (1 727 m)   Wt 85 8 kg (189 lb 3 2 oz)   SpO2 97%   BMI 28 77 kg/m²          Physical Exam  Constitutional:       General: He is not in acute distress  Appearance: He is well-developed  He is not diaphoretic  HENT:      Head: Normocephalic     Eyes:      General: Right eye: No discharge  Left eye: No discharge  Pupils: Pupils are equal, round, and reactive to light  Neck:      Thyroid: No thyromegaly  Trachea: No tracheal deviation  Cardiovascular:      Rate and Rhythm: Normal rate and regular rhythm  Heart sounds: Normal heart sounds  No murmur  Pulmonary:      Effort: Pulmonary effort is normal  No respiratory distress  Breath sounds: No wheezing or rales  Abdominal:      General: There is no distension  Palpations: Abdomen is soft  Tenderness: There is no abdominal tenderness  Musculoskeletal: Normal range of motion  Lymphadenopathy:      Cervical: No cervical adenopathy  Skin:     General: Skin is warm  Findings: No erythema  Neurological:      Mental Status: He is alert and oriented to person, place, and time  Cranial Nerves: No cranial nerve deficit  Psychiatric:         Thought Content:  Thought content normal          Judgment: Judgment normal            Naomi Mota MD

## 2021-03-31 ENCOUNTER — TELEPHONE (OUTPATIENT)
Dept: PULMONOLOGY | Facility: CLINIC | Age: 71
End: 2021-03-31

## 2021-03-31 NOTE — TELEPHONE ENCOUNTER
Patient calling stating he has been more SOB lately than he has in the past and is very concerned   Please advise [phone]

## 2021-03-31 NOTE — TELEPHONE ENCOUNTER
Spoke with Sonia Luke  He said for the past week he has increased SOB on exertion  He has to stop and catch his breath  Denies cough, wheeze, chest tightness, fevers and chills  He does not have any inhalers or nebulizer  He said he is more SOB now then when he was seen last   Please advise   Jessie Mcdaniels

## 2021-03-31 NOTE — TELEPHONE ENCOUNTER
Called patient but no answer  I would investigate if there is any signs or symptoms of volume overload like lower extremity edema, orthopnea, abdominal bloating etc  I asked him to call back and if he does let me know

## 2021-04-05 ENCOUNTER — TELEPHONE (OUTPATIENT)
Dept: PULMONOLOGY | Facility: CLINIC | Age: 71
End: 2021-04-05

## 2021-04-05 NOTE — TELEPHONE ENCOUNTER
Patient calling stating since he spoke with [facility] on 3/31 he tried the singulair and albuterol inhaler and he stated it doesn't seem to be working  He feels he has been getting worse over the last few days  Patient states he is very concerned and doesn't know what he should do   Please advise [phone]

## 2021-04-05 NOTE — TELEPHONE ENCOUNTER
Telephone note- Pulmonary Medicine   Maureen Landrum 78 y o  male MRN: [id number]    Reason for call:    Sick call      Pertinent details:  Reports: persistent dyspnea regardless of use of albuterol    Plan:   Discussed with Dr Kunal Fish steroid taper, patient refuses and requests follow up            Yin Soliz, Magrarito Shabazz

## 2021-04-06 ENCOUNTER — IMMUNIZATIONS (OUTPATIENT)
Dept: FAMILY MEDICINE CLINIC | Facility: HOSPITAL | Age: 71
End: 2021-04-06

## 2021-04-06 DIAGNOSIS — Z23 ENCOUNTER FOR IMMUNIZATION: Primary | ICD-10-CM

## 2021-04-06 PROCEDURE — 0011A SARS-COV-2 / COVID-19 MRNA VACCINE (MODERNA) 100 MCG: CPT

## 2021-04-06 PROCEDURE — 91301 SARS-COV-2 / COVID-19 MRNA VACCINE (MODERNA) 100 MCG: CPT

## 2021-04-08 PROCEDURE — 99291 CRITICAL CARE FIRST HOUR: CPT | Performed by: EMERGENCY MEDICINE

## 2021-04-09 ENCOUNTER — APPOINTMENT (EMERGENCY)
Dept: RADIOLOGY | Facility: HOSPITAL | Age: 71
DRG: 841 | End: 2021-04-09
Payer: MEDICARE

## 2021-04-09 ENCOUNTER — ANESTHESIA (INPATIENT)
Dept: GASTROENTEROLOGY | Facility: HOSPITAL | Age: 71
DRG: 841 | End: 2021-04-09
Payer: MEDICARE

## 2021-04-09 ENCOUNTER — APPOINTMENT (INPATIENT)
Dept: RADIOLOGY | Facility: HOSPITAL | Age: 71
DRG: 841 | End: 2021-04-09
Payer: MEDICARE

## 2021-04-09 ENCOUNTER — HOSPITAL ENCOUNTER (INPATIENT)
Facility: HOSPITAL | Age: 71
LOS: 3 days | Discharge: HOME | DRG: 841 | End: 2021-04-12
Attending: EMERGENCY MEDICINE | Admitting: INTERNAL MEDICINE
Payer: MEDICARE

## 2021-04-09 ENCOUNTER — APPOINTMENT (INPATIENT)
Dept: GASTROENTEROLOGY | Facility: HOSPITAL | Age: 71
DRG: 841 | End: 2021-04-09
Payer: MEDICARE

## 2021-04-09 ENCOUNTER — ANESTHESIA EVENT (INPATIENT)
Dept: GASTROENTEROLOGY | Facility: HOSPITAL | Age: 71
DRG: 841 | End: 2021-04-09
Payer: MEDICARE

## 2021-04-09 DIAGNOSIS — C91.10 CLL (CHRONIC LYMPHOCYTIC LEUKEMIA) (HCC): ICD-10-CM

## 2021-04-09 DIAGNOSIS — E87.1 HYPONATREMIA: ICD-10-CM

## 2021-04-09 DIAGNOSIS — I20.8 STABLE ANGINA (HCC): ICD-10-CM

## 2021-04-09 DIAGNOSIS — R06.00 DYSPNEA ON EXERTION: ICD-10-CM

## 2021-04-09 DIAGNOSIS — D64.9 ACUTE ANEMIA: Primary | ICD-10-CM

## 2021-04-09 DIAGNOSIS — G47.00 INSOMNIA, UNSPECIFIED TYPE: ICD-10-CM

## 2021-04-09 PROBLEM — R06.09 DYSPNEA ON EXERTION: Status: ACTIVE | Noted: 2021-04-09

## 2021-04-09 LAB
ABO GROUP BLD: NORMAL
ALBUMIN SERPL BCP-MCNC: 3.7 G/DL (ref 3.5–5)
ALP SERPL-CCNC: 88 U/L (ref 46–116)
ALT SERPL W P-5'-P-CCNC: 18 U/L (ref 12–78)
ANION GAP SERPL CALCULATED.3IONS-SCNC: 8 MMOL/L (ref 4–13)
ANION GAP SERPL CALCULATED.3IONS-SCNC: 9 MMOL/L (ref 4–13)
ANISOCYTOSIS BLD QL SMEAR: PRESENT
AST SERPL W P-5'-P-CCNC: 21 U/L (ref 5–45)
ATRIAL RATE: 227 BPM
ATRIAL RATE: 234 BPM
BASOPHILS # BLD MANUAL: 0 THOUSAND/UL (ref 0–0.1)
BASOPHILS NFR BLD AUTO: 0 % (ref 0–1)
BASOPHILS NFR MAR MANUAL: 0 % (ref 0–1)
BILIRUB SERPL-MCNC: 1.97 MG/DL (ref 0.2–1)
BLD GP AB SCN SERPL QL: NEGATIVE
BLOOD GROUP ANTIBODIES SERPL: NORMAL
BUN SERPL-MCNC: 10 MG/DL (ref 5–25)
BUN SERPL-MCNC: 10 MG/DL (ref 5–25)
C AG RBC QL: POSITIVE
CALCIUM SERPL-MCNC: 8.1 MG/DL (ref 8.3–10.1)
CALCIUM SERPL-MCNC: 8.2 MG/DL (ref 8.3–10.1)
CHLORIDE SERPL-SCNC: 100 MMOL/L (ref 100–108)
CHLORIDE SERPL-SCNC: 95 MMOL/L (ref 100–108)
CO2 SERPL-SCNC: 24 MMOL/L (ref 21–32)
CO2 SERPL-SCNC: 26 MMOL/L (ref 21–32)
CREAT SERPL-MCNC: 0.76 MG/DL (ref 0.6–1.3)
CREAT SERPL-MCNC: 0.77 MG/DL (ref 0.6–1.3)
D DIMER PPP FEU-MCNC: 0.69 UG/ML FEU
DACRYOCYTES BLD QL SMEAR: PRESENT
E AG RBC QL: NEGATIVE
EOSINOPHIL # BLD MANUAL: 0.74 THOUSAND/UL (ref 0–0.4)
EOSINOPHIL NFR BLD AUTO: 0 % (ref 0–6)
EOSINOPHIL NFR BLD MANUAL: 1 % (ref 0–6)
ERYTHROCYTE [DISTWIDTH] IN BLOOD BY AUTOMATED COUNT: 27 % (ref 11.6–15.1)
ERYTHROCYTE [DISTWIDTH] IN BLOOD BY AUTOMATED COUNT: 27.3 % (ref 11.6–15.1)
ERYTHROCYTE [DISTWIDTH] IN BLOOD BY AUTOMATED COUNT: 33.1 % (ref 11.6–15.1)
FERRITIN SERPL-MCNC: 484 NG/ML (ref 8–388)
GFR SERPL CREATININE-BSD FRML MDRD: 93 ML/MIN/1.73SQ M
GFR SERPL CREATININE-BSD FRML MDRD: 94 ML/MIN/1.73SQ M
GLUCOSE SERPL-MCNC: 119 MG/DL (ref 65–140)
GLUCOSE SERPL-MCNC: 127 MG/DL (ref 65–140)
HCT VFR BLD AUTO: 14.9 % (ref 36.5–49.3)
HCT VFR BLD AUTO: 14.9 % (ref 36.5–49.3)
HCT VFR BLD AUTO: 20.7 % (ref 36.5–49.3)
HCT VFR BLD AUTO: 23.6 % (ref 36.5–49.3)
HGB BLD-MCNC: 4.6 G/DL (ref 12–17)
HGB BLD-MCNC: 4.6 G/DL (ref 12–17)
HGB BLD-MCNC: 6.7 G/DL (ref 12–17)
HGB BLD-MCNC: 7.3 G/DL (ref 12–17)
IMM GRANULOCYTES NFR BLD AUTO: 0 % (ref 0–2)
IRON SATN MFR SERPL: 33 %
IRON SERPL-MCNC: 107 UG/DL (ref 65–175)
KELL GROUP AG RBC: NEGATIVE
LDH SERPL-CCNC: 415 U/L (ref 81–234)
LITTLE C AG RBC QL: POSITIVE
LYMPHOCYTES # BLD AUTO: 62.44 THOUSAND/UL (ref 0.6–4.47)
LYMPHOCYTES # BLD AUTO: 84 % (ref 14–44)
LYMPHOCYTES NFR BLD AUTO: 90 % (ref 14–44)
MACROCYTES BLD QL AUTO: PRESENT
MCH RBC QN AUTO: 36.2 PG (ref 26.8–34.3)
MCH RBC QN AUTO: 41.1 PG (ref 26.8–34.3)
MCH RBC QN AUTO: 41.4 PG (ref 26.8–34.3)
MCHC RBC AUTO-ENTMCNC: 30.9 G/DL (ref 31.4–37.4)
MCHC RBC AUTO-ENTMCNC: 30.9 G/DL (ref 31.4–37.4)
MCHC RBC AUTO-ENTMCNC: 32.4 G/DL (ref 31.4–37.4)
MCV RBC AUTO: 112 FL (ref 82–98)
MCV RBC AUTO: 133 FL (ref 82–98)
MCV RBC AUTO: 134 FL (ref 82–98)
MONOCYTES # BLD AUTO: 0 THOUSAND/UL (ref 0–1.22)
MONOCYTES NFR BLD AUTO: 4 % (ref 4–12)
MONOCYTES NFR BLD: 0 % (ref 4–12)
NEUTROPHILS # BLD MANUAL: 5.95 THOUSAND/UL (ref 1.85–7.62)
NEUTS BAND NFR BLD MANUAL: 1 % (ref 0–8)
NEUTS SEG NFR BLD AUTO: 6 % (ref 43–75)
NEUTS SEG NFR BLD AUTO: 7 % (ref 43–75)
NRBC BLD AUTO-RTO: 0 /100 WBCS
NRBC BLD AUTO-RTO: 0 /100 WBCS
OVALOCYTES BLD QL SMEAR: PRESENT
P AXIS: 78 DEGREES
PLATELET # BLD AUTO: 164 THOUSANDS/UL (ref 149–390)
PLATELET # BLD AUTO: 170 THOUSANDS/UL (ref 149–390)
PLATELET # BLD AUTO: 181 THOUSANDS/UL (ref 149–390)
PLATELET BLD QL SMEAR: ADEQUATE
PMV BLD AUTO: 10.1 FL (ref 8.9–12.7)
PMV BLD AUTO: 10.3 FL (ref 8.9–12.7)
PMV BLD AUTO: 9.8 FL (ref 8.9–12.7)
POIKILOCYTOSIS BLD QL SMEAR: PRESENT
POLYCHROMASIA BLD QL SMEAR: PRESENT
POTASSIUM SERPL-SCNC: 4.2 MMOL/L (ref 3.5–5.3)
POTASSIUM SERPL-SCNC: 4.2 MMOL/L (ref 3.5–5.3)
PROT SERPL-MCNC: 7.6 G/DL (ref 6.4–8.2)
QRS AXIS: 74 DEGREES
QRS AXIS: 76 DEGREES
QRSD INTERVAL: 86 MS
QRSD INTERVAL: 94 MS
QT INTERVAL: 374 MS
QT INTERVAL: 378 MS
QTC INTERVAL: 405 MS
QTC INTERVAL: 406 MS
RBC # BLD AUTO: 1.11 MILLION/UL (ref 3.88–5.62)
RBC # BLD AUTO: 1.12 MILLION/UL (ref 3.88–5.62)
RBC # BLD AUTO: 1.85 MILLION/UL (ref 3.88–5.62)
RH BLD: POSITIVE
SMUDGE CELLS BLD QL SMEAR: PRESENT
SODIUM SERPL-SCNC: 128 MMOL/L (ref 136–145)
SODIUM SERPL-SCNC: 134 MMOL/L (ref 136–145)
SPECIMEN EXPIRATION DATE: NORMAL
T WAVE AXIS: 78 DEGREES
T WAVE AXIS: 80 DEGREES
TARGETS BLD QL SMEAR: PRESENT
TIBC SERPL-MCNC: 324 UG/DL (ref 250–450)
TOTAL CELLS COUNTED SPEC: 100
TROPONIN I SERPL-MCNC: <0.02 NG/ML
VARIANT LYMPHS # BLD AUTO: 7 %
VENTRICULAR RATE: 69 BPM
VENTRICULAR RATE: 71 BPM
WBC # BLD AUTO: 74.33 THOUSAND/UL (ref 4.31–10.16)
WBC # BLD AUTO: 77.59 THOUSAND/UL (ref 4.31–10.16)
WBC # BLD AUTO: 89.58 THOUSAND/UL (ref 4.31–10.16)

## 2021-04-09 PROCEDURE — 85379 FIBRIN DEGRADATION QUANT: CPT | Performed by: EMERGENCY MEDICINE

## 2021-04-09 PROCEDURE — 93010 ELECTROCARDIOGRAM REPORT: CPT | Performed by: INTERNAL MEDICINE

## 2021-04-09 PROCEDURE — 74176 CT ABD & PELVIS W/O CONTRAST: CPT

## 2021-04-09 PROCEDURE — 83615 LACTATE (LD) (LDH) ENZYME: CPT | Performed by: PHYSICIAN ASSISTANT

## 2021-04-09 PROCEDURE — 85007 BL SMEAR W/DIFF WBC COUNT: CPT | Performed by: INTERNAL MEDICINE

## 2021-04-09 PROCEDURE — 80053 COMPREHEN METABOLIC PANEL: CPT | Performed by: EMERGENCY MEDICINE

## 2021-04-09 PROCEDURE — 80048 BASIC METABOLIC PNL TOTAL CA: CPT | Performed by: INTERNAL MEDICINE

## 2021-04-09 PROCEDURE — P9016 RBC LEUKOCYTES REDUCED: HCPCS

## 2021-04-09 PROCEDURE — 99285 EMERGENCY DEPT VISIT HI MDM: CPT

## 2021-04-09 PROCEDURE — 86905 BLOOD TYPING RBC ANTIGENS: CPT

## 2021-04-09 PROCEDURE — 83010 ASSAY OF HAPTOGLOBIN QUANT: CPT | Performed by: PHYSICIAN ASSISTANT

## 2021-04-09 PROCEDURE — 85014 HEMATOCRIT: CPT | Performed by: PHYSICIAN ASSISTANT

## 2021-04-09 PROCEDURE — 0D578ZZ DESTRUCTION OF STOMACH, PYLORUS, VIA NATURAL OR ARTIFICIAL OPENING ENDOSCOPIC: ICD-10-PCS | Performed by: INTERNAL MEDICINE

## 2021-04-09 PROCEDURE — 86922 COMPATIBILITY TEST ANTIGLOB: CPT

## 2021-04-09 PROCEDURE — 86870 RBC ANTIBODY IDENTIFICATION: CPT | Performed by: INTERNAL MEDICINE

## 2021-04-09 PROCEDURE — G1004 CDSM NDSC: HCPCS

## 2021-04-09 PROCEDURE — C9113 INJ PANTOPRAZOLE SODIUM, VIA: HCPCS | Performed by: INTERNAL MEDICINE

## 2021-04-09 PROCEDURE — 99223 1ST HOSP IP/OBS HIGH 75: CPT | Performed by: INTERNAL MEDICINE

## 2021-04-09 PROCEDURE — 86901 BLOOD TYPING SEROLOGIC RH(D): CPT | Performed by: EMERGENCY MEDICINE

## 2021-04-09 PROCEDURE — 86850 RBC ANTIBODY SCREEN: CPT | Performed by: EMERGENCY MEDICINE

## 2021-04-09 PROCEDURE — 85025 COMPLETE CBC W/AUTO DIFF WBC: CPT | Performed by: EMERGENCY MEDICINE

## 2021-04-09 PROCEDURE — 30233N1 TRANSFUSION OF NONAUTOLOGOUS RED BLOOD CELLS INTO PERIPHERAL VEIN, PERCUTANEOUS APPROACH: ICD-10-PCS | Performed by: INTERNAL MEDICINE

## 2021-04-09 PROCEDURE — 36415 COLL VENOUS BLD VENIPUNCTURE: CPT | Performed by: EMERGENCY MEDICINE

## 2021-04-09 PROCEDURE — 83540 ASSAY OF IRON: CPT | Performed by: INTERNAL MEDICINE

## 2021-04-09 PROCEDURE — 85018 HEMOGLOBIN: CPT | Performed by: PHYSICIAN ASSISTANT

## 2021-04-09 PROCEDURE — 71045 X-RAY EXAM CHEST 1 VIEW: CPT

## 2021-04-09 PROCEDURE — 84484 ASSAY OF TROPONIN QUANT: CPT | Performed by: EMERGENCY MEDICINE

## 2021-04-09 PROCEDURE — 84484 ASSAY OF TROPONIN QUANT: CPT | Performed by: INTERNAL MEDICINE

## 2021-04-09 PROCEDURE — 1123F ACP DISCUSS/DSCN MKR DOCD: CPT | Performed by: EMERGENCY MEDICINE

## 2021-04-09 PROCEDURE — 43270 EGD LESION ABLATION: CPT | Performed by: INTERNAL MEDICINE

## 2021-04-09 PROCEDURE — 86921 COMPATIBILITY TEST INCUBATE: CPT

## 2021-04-09 PROCEDURE — 85027 COMPLETE CBC AUTOMATED: CPT | Performed by: INTERNAL MEDICINE

## 2021-04-09 PROCEDURE — 82728 ASSAY OF FERRITIN: CPT | Performed by: INTERNAL MEDICINE

## 2021-04-09 PROCEDURE — 93005 ELECTROCARDIOGRAM TRACING: CPT

## 2021-04-09 PROCEDURE — 86900 BLOOD TYPING SEROLOGIC ABO: CPT | Performed by: EMERGENCY MEDICINE

## 2021-04-09 PROCEDURE — 83550 IRON BINDING TEST: CPT | Performed by: INTERNAL MEDICINE

## 2021-04-09 RX ORDER — PROPOFOL 10 MG/ML
INJECTION, EMULSION INTRAVENOUS AS NEEDED
Status: DISCONTINUED | OUTPATIENT
Start: 2021-04-09 | End: 2021-04-09

## 2021-04-09 RX ORDER — SODIUM CHLORIDE 9 MG/ML
INJECTION, SOLUTION INTRAVENOUS CONTINUOUS PRN
Status: DISCONTINUED | OUTPATIENT
Start: 2021-04-09 | End: 2021-04-09

## 2021-04-09 RX ORDER — TAMSULOSIN HYDROCHLORIDE 0.4 MG/1
0.4 CAPSULE ORAL
Status: DISCONTINUED | OUTPATIENT
Start: 2021-04-09 | End: 2021-04-12 | Stop reason: HOSPADM

## 2021-04-09 RX ORDER — PANTOPRAZOLE SODIUM 40 MG/1
40 INJECTION, POWDER, FOR SOLUTION INTRAVENOUS EVERY 12 HOURS SCHEDULED
Status: DISCONTINUED | OUTPATIENT
Start: 2021-04-09 | End: 2021-04-10

## 2021-04-09 RX ORDER — ONDANSETRON 2 MG/ML
4 INJECTION INTRAMUSCULAR; INTRAVENOUS EVERY 6 HOURS PRN
Status: DISCONTINUED | OUTPATIENT
Start: 2021-04-09 | End: 2021-04-12 | Stop reason: HOSPADM

## 2021-04-09 RX ORDER — ONDANSETRON 2 MG/ML
4 INJECTION INTRAMUSCULAR; INTRAVENOUS ONCE
Status: COMPLETED | OUTPATIENT
Start: 2021-04-09 | End: 2021-04-09

## 2021-04-09 RX ORDER — CHLORAL HYDRATE 500 MG
1000 CAPSULE ORAL DAILY
Status: DISCONTINUED | OUTPATIENT
Start: 2021-04-09 | End: 2021-04-12 | Stop reason: HOSPADM

## 2021-04-09 RX ORDER — LORAZEPAM 0.5 MG/1
0.5 TABLET ORAL ONCE
Status: COMPLETED | OUTPATIENT
Start: 2021-04-09 | End: 2021-04-09

## 2021-04-09 RX ORDER — LORAZEPAM 0.5 MG/1
0.25 TABLET ORAL ONCE AS NEEDED
Status: COMPLETED | OUTPATIENT
Start: 2021-04-09 | End: 2021-04-09

## 2021-04-09 RX ORDER — AMOXICILLIN 250 MG
1 CAPSULE ORAL
Status: DISCONTINUED | OUTPATIENT
Start: 2021-04-09 | End: 2021-04-12 | Stop reason: HOSPADM

## 2021-04-09 RX ORDER — ASPIRIN 81 MG/1
162 TABLET, CHEWABLE ORAL ONCE
Status: COMPLETED | OUTPATIENT
Start: 2021-04-09 | End: 2021-04-09

## 2021-04-09 RX ORDER — ATORVASTATIN CALCIUM 20 MG/1
20 TABLET, FILM COATED ORAL
Status: DISCONTINUED | OUTPATIENT
Start: 2021-04-09 | End: 2021-04-12 | Stop reason: HOSPADM

## 2021-04-09 RX ORDER — PANTOPRAZOLE SODIUM 40 MG/1
40 TABLET, DELAYED RELEASE ORAL
Status: DISCONTINUED | OUTPATIENT
Start: 2021-04-09 | End: 2021-04-09

## 2021-04-09 RX ORDER — AMLODIPINE BESYLATE 5 MG/1
5 TABLET ORAL EVERY EVENING
Status: DISCONTINUED | OUTPATIENT
Start: 2021-04-09 | End: 2021-04-09

## 2021-04-09 RX ADMIN — PROPOFOL 40 MG: 10 INJECTION, EMULSION INTRAVENOUS at 15:11

## 2021-04-09 RX ADMIN — SENNOSIDES AND DOCUSATE SODIUM 1 TABLET: 8.6; 5 TABLET ORAL at 21:12

## 2021-04-09 RX ADMIN — Medication 1 TABLET: at 11:48

## 2021-04-09 RX ADMIN — PROPOFOL 40 MG: 10 INJECTION, EMULSION INTRAVENOUS at 15:16

## 2021-04-09 RX ADMIN — PROPOFOL 40 MG: 10 INJECTION, EMULSION INTRAVENOUS at 15:06

## 2021-04-09 RX ADMIN — LORAZEPAM 0.25 MG: 0.5 TABLET ORAL at 22:25

## 2021-04-09 RX ADMIN — OMEGA-3 FATTY ACIDS CAP 1000 MG 1000 MG: 1000 CAP at 11:48

## 2021-04-09 RX ADMIN — PANTOPRAZOLE SODIUM 40 MG: 40 INJECTION, POWDER, FOR SOLUTION INTRAVENOUS at 11:49

## 2021-04-09 RX ADMIN — METOPROLOL TARTRATE 25 MG: 25 TABLET, FILM COATED ORAL at 21:04

## 2021-04-09 RX ADMIN — TAMSULOSIN HYDROCHLORIDE 0.4 MG: 0.4 CAPSULE ORAL at 18:16

## 2021-04-09 RX ADMIN — ATORVASTATIN CALCIUM 20 MG: 20 TABLET, FILM COATED ORAL at 18:16

## 2021-04-09 RX ADMIN — PANTOPRAZOLE SODIUM 40 MG: 40 TABLET, DELAYED RELEASE ORAL at 06:27

## 2021-04-09 RX ADMIN — SODIUM CHLORIDE: 0.9 INJECTION, SOLUTION INTRAVENOUS at 14:56

## 2021-04-09 RX ADMIN — LORAZEPAM 0.5 MG: 0.5 TABLET ORAL at 03:48

## 2021-04-09 RX ADMIN — SERTRALINE HYDROCHLORIDE 50 MG: 50 TABLET ORAL at 11:48

## 2021-04-09 RX ADMIN — ASPIRIN 162 MG: 81 TABLET, CHEWABLE ORAL at 00:45

## 2021-04-09 RX ADMIN — ONDANSETRON 4 MG: 2 INJECTION INTRAMUSCULAR; INTRAVENOUS at 02:38

## 2021-04-09 RX ADMIN — PANTOPRAZOLE SODIUM 40 MG: 40 INJECTION, POWDER, FOR SOLUTION INTRAVENOUS at 21:04

## 2021-04-09 RX ADMIN — PROPOFOL 100 MG: 10 INJECTION, EMULSION INTRAVENOUS at 15:02

## 2021-04-09 NOTE — ANESTHESIA POSTPROCEDURE EVALUATION
Post-Op Assessment Note    CV Status:  Stable  Pain Score: 0    Pain management: adequate     Mental Status:  Alert and awake   Hydration Status:  Stable   PONV Controlled:  None   Airway Patency:  Patent      Post Op Vitals Reviewed: Yes      Staff: CRNA         No complications documented      /53    Temp 99 3 °F (37 4 °C) (04/09/21 1524)    Pulse 72 (04/09/21 1524)   Resp 18 (04/09/21 1524)    SpO2 95 % (04/09/21 1524)

## 2021-04-09 NOTE — ASSESSMENT & PLAN NOTE
· Sodium 128 on admission, did have prior chronic hyponatremia 131-134 noted however some recent normal values  · Will assess response to PRBC transfusion, monitor with BMP

## 2021-04-09 NOTE — ASSESSMENT & PLAN NOTE
· Follows with Dr Maira Hernandez, echo 01/2021 EF 60% with peak pressure 75 mmHg  · May need IV diuresis if shortness of breath develops during blood transfusion

## 2021-04-09 NOTE — ANESTHESIA PREPROCEDURE EVALUATION
Procedure:  EGD    Relevant Problems   CARDIO   (+) Dyspnea on exertion   (+) Essential hypertension   (+) Hypercholesterolemia   (+) Sinus los-tachy syndrome (HCC)      GI/HEPATIC   (+) Esophageal reflux      /RENAL   (+) BPH with obstruction/lower urinary tract symptoms      HEMATOLOGY   (+) Acute on chronic anemia   (+) Iron deficiency anemia      MUSCULOSKELETAL   (+) Arthritis of right hip   (+) DDD (degenerative disc disease), cervical   (+) Primary osteoarthritis of right hip      NEURO/PSYCH   (+) History of colon polyps   (+) Mild depression (HCC)      PULMONARY   (+) Dyspnea on exertion   (+) DIXIE not currently treated      Other   (+) CLL (chronic lymphocytic leukemia) (HCC)   (+) Splenomegaly      1/2021:  Normal LV systolic function, mild RV systolic dysfunction, mild MR, mod TR, PASP 75 mmHg       Anesthesia Plan  ASA Score- 3 Emergent    Anesthesia Type- IV sedation with anesthesia with ASA Monitors  Additional Monitors:   Airway Plan:     Comment: IV sedation,  standard ASA monitors  Risks and benefits discussed with patient; patient consented and agrees to proceed  I saw and evaluated the patient  If seen with CRNA, we have discussed the anesthetic plan and I am in agreement that the plan is appropriate for the patient          Plan Factors-    Induction- intravenous  Postoperative Plan-     Informed Consent- Anesthetic plan and risks discussed with patient  I personally reviewed this patient with the CRNA  Discussed and agreed on the Anesthesia Plan with the CRNA  Uma Villanueva

## 2021-04-09 NOTE — ED NOTES
Per GI, they would like patient to receive all 3 units of blood before EGD at 1300 today  Made them aware that patient has yet to receive first unit due to patient having rare type of blood that would require longer prep time and that first unit received, the bag was damaged        Beckie Rahman, RN  04/09/21 1483

## 2021-04-09 NOTE — ED ATTENDING ATTESTATION
4/9/2021  IVladimir DO, saw and evaluated the patient  I have discussed the patient with the resident/non-physician practitioner and agree with the resident's/non-physician practitioner's findings, Plan of Care, and MDM as documented in the resident's/non-physician practitioner's note, except where noted  All available labs and Radiology studies were reviewed  I was present for key portions of any procedure(s) performed by the resident/non-physician practitioner and I was immediately available to provide assistance  At this point I agree with the current assessment done in the Emergency Department  I have conducted an independent evaluation of this patient a history and physical is as follows:    51-year-old male with history of CLL p/w with dyspnea, weakness worse with exertion, onset approx 2 weeks ago hypoxic on arrival, improved with 4LNC  Looks pale  No chest pain        Had covid in icu, on vent 3 months ago       Plan cardiac workup, PE, cxr for pneumonia, likely admit for hypoxia    ED Course     Patient profoundly anemic, requiring blood administration, likely cause of hypoxia is multifactoral, will give blood, admit    Critical Care Time  CriticalCare Time  Performed by: Vladimir Young DO  Authorized by: Vladimir Young DO     Critical care provider statement:     Critical care time (minutes):  30    Critical care time was exclusive of:  Separately billable procedures and treating other patients and teaching time    Critical care was necessary to treat or prevent imminent or life-threatening deterioration of the following conditions:  Shock    Critical care was time spent personally by me on the following activities:  Blood draw for specimens, obtaining history from patient or surrogate, re-evaluation of patient's condition, review of old charts, evaluation of patient's response to treatment, examination of patient, ordering and review of laboratory studies, ordering and performing treatments and interventions, development of treatment plan with patient or surrogate and ordering and review of radiographic studies

## 2021-04-09 NOTE — CONSULTS
Oncology Consult Note  Maximo Servin 78 y o  male MRN: [id number]  Unit/Bed#: ED 20 Encounter: [id number]      Presenting Complaint:  CLL  Acute onset of macrocytic anemia  History of Presenting Illness:  A 71-year-old gentleman who was found to have CLL in 2010  He has lymphocytosis  However, he has no constitutional symptoms  Therefore, he has been on watchful waiting  He did not have any CLL treatment in the past   In the past, he has history of iron deficiency anemia for which she was on intravenous iron  His hemoglobin was 10 2 in early March 2021  However, he developed exertional shortness of breath as well as weakness  Therefore, he came to the emergency department  His hemoglobin was 4 6  MCV was 133  He has markedly elevated lymphocytosis compared to a month ago  His platelet count is normal range  He has no recent history of dark stool or hematochezia  He has no abdominal pain  He has some shortness of breath but no complaint of pain  He denied fever, chills or night sweats  Review of Systems - As stated in the HPI otherwise the fourteen point review of systems was negative      Past Medical History:   Diagnosis Date    Anxiety     BPH (benign prostatic hypertrophy)     Cancer (HCC)     CLL    CLL [facility]chronic lymphocytic leukemia) ([facility] [address] )     2009    Colon polyp     Concussion     Resolved: 08/22/16    Depression     Gastrointestinal hemorrhage     Last assessed: 08/27/13    GERD (gastroesophageal reflux disease)     Hearing loss of aging     Hiatal hernia     History of transfusion     Hyperlipidemia     Hypertension     Iron deficiency anemia     Microscopic hematuria     Last assessed: 06/28/13    OA (osteoarthritis)     right hip    Pneumothorax     Prostatitis     Pulmonary hypertension (HCC)     Seasonal allergies     UTI (urinary tract infection)     in past    Wears glasses        Social History     Socioeconomic History    Marital status: /Civil Union Spouse name: None    Number of children: None    Years of education: None    Highest education level: None   Occupational History    None   Social Needs    Financial resource strain: None    Food insecurity     Worry: None     Inability: None    Transportation needs     Medical: None     Non-medical: None   Tobacco Use    Smoking status: Former Smoker     Packs/day: 1 00     Years: 15 00     Pack years: 15      Types: Cigarettes     Start date:      Quit date: 1980     Years since quittin 11    Smokeless tobacco: Never Used   Substance and Sexual Activity    Alcohol use:  Yes     Alcohol/week: 10 0 standard drinks     Types: 10 Cans of beer per week     Frequency: 4 or more times a week     Drinks per session: 3 or 4    Drug use: Not Currently     Types: Marijuana    Sexual activity: Not Currently   Lifestyle    Physical activity     Days per week: None     Minutes per session: None    Stress: None   Relationships    Social connections     Talks on phone: None     Gets together: None     Attends Confucianism service: None     Active member of club or organization: None     Attends meetings of clubs or organizations: None     Relationship status: None    Intimate partner violence     Fear of current or ex partner: None     Emotionally abused: None     Physically abused: None     Forced sexual activity: None   Other Topics Concern    None   Social History Narrative    None       Family History   Problem Relation Age of Onset    No Known Problems Mother     Heart attack Father     Diabetes Brother     Prostate cancer Brother        Allergies   Allergen Reactions    Codeine Other (See Comments)     agitated    Sulfamethoxazole-Trimethoprim GI Intolerance and Abdominal Pain     upset stomach         Current Facility-Administered Medications:     atorvastatin (LIPITOR) tablet 20 mg, 20 mg, Oral, Daily With Dinner, Sidney Stewart DO    fish oil capsule 1,000 mg, 1,000 mg, Oral, Daily, [place] Slight, DO, 1,000 mg at 04/09/21 1148    metoprolol tartrate (LOPRESSOR) tablet 25 mg, 25 mg, Oral, Q12H [facility], [place]a Slight, DO    multivitamin-minerals (CENTRUM) tablet 1 tablet, 1 tablet, Oral, Daily, [place]a Slight, DO, 1 tablet at 04/09/21 1148    ondansetron (ZOFRAN) injection 4 mg, 4 mg, Intravenous, Q6H PRN, [place]a Slight, DO    pantoprazole (PROTONIX) injection 40 mg, 40 mg, Intravenous, Q12H [facility], Andrzej Bradley MD, 40 mg at 04/09/21 1149    senna-docusate sodium (SENOKOT S) 8 6-50 mg per tablet 1 tablet, 1 tablet, Oral, HS, T[place]a Slight, DO    sertraline (ZOLOFT) tablet 50 mg, 50 mg, Oral, Daily, T[place]a Slight, DO, 50 mg at 04/09/21 1148    tamsulosin (FLOMAX) capsule 0 4 mg, 0 4 mg, Oral, Daily With Dinner, T[place]a Slight, DO    Current Outpatient Medications:     amLODIPine (NORVASC) 5 mg tablet, TAKE ONE TABLET BY MOUTH EVERY EVENING, Disp: 90 tablet, Rfl: 3    aspirin 81 MG tablet, Take 1 tablet by mouth daily, Disp: , Rfl:     atorvastatin (LIPITOR) 20 mg tablet, TAKE ONE TABLET BY MOUTH ONCE EVERY DAY, Disp: , Rfl:     metoprolol tartrate (LOPRESSOR) 25 mg tablet, Take 1 tablet (25 mg total) by mouth every 12 (twelve) hours, Disp: 180 tablet, Rfl: 3    Multiple Vitamin (multivitamin) tablet, Take 1 tablet by mouth daily, Disp: 90 tablet, Rfl: 3    Omega-3 Fatty Acids (FISH OIL) 1,000 mg, Take by mouth daily, Disp: , Rfl:     omeprazole (PriLOSEC) 40 MG capsule, TAKE ONE CAPSULE BY MOUTH ONCE EVERY DAY, Disp: 90 capsule, Rfl: 3    senna-docusate sodium (SENOKOT S) 8 6-50 mg per tablet, Take 1 tablet by mouth daily at bedtime, Disp: 30 tablet, Rfl: 0    sertraline (ZOLOFT) 50 mg tablet, TAKE ONE TABLET BY MOUTH EVERY DAY, Disp: 90 tablet, Rfl: 3    tamsulosin (FLOMAX) 0 4 mg, Take 1 capsule (0 4 mg total) by mouth daily with dinner CORRECTED SCRIPT! Note change in SIG and QUANTITY  Please process accordingly    Thank you! (9/1/20), Disp: 90 capsule, Rfl: 1      /71 Pulse 74   Temp 98 9 °F (37 2 °C) (Tympanic)   Resp 18   Wt 84 8 kg (187 lb)   SpO2 96%   BMI 28 43 kg/m²     General Appearance:    Alert, oriented        Eyes:    PERRL   Ears:    Normal external ear canals, both ears   Nose:   Nares normal, septum midline   Throat:   Mucosa moist  Pharynx without injection  Neck:   Supple       Lungs:     Clear to auscultation bilaterally   Chest Wall:    No tenderness or deformity    Heart:    Regular rate and rhythm       Abdomen:     Soft, non-tender, bowel sounds +, no organomegaly           Extremities:   Extremities no cyanosis or edema       Skin:   no rash or icterus      Lymph nodes:   Cervical, supraclavicular, and axillary nodes normal   Neurologic:   CNII-XII intact, normal strength, sensation and reflexes     Throughout               Recent Results (from the past 48 hour(s))   ECG 12 lead    Collection Time: 04/09/21 12:37 AM   Result Value Ref Range    Ventricular Rate 71 BPM    Atrial Rate 234 BPM    WI Interval  ms    QRSD Interval 86 ms    QT Interval 374 ms    QTC Interval 406 ms    P Axis 78 degrees    QRS Axis 76 degrees    T Wave Axis 80 degrees   CBC and differential    Collection Time: 04/09/21 12:45 AM   Result Value Ref Range    WBC 89 58 (HH) 4 31 - 10 16 Thousand/uL    RBC 1 12 (L) 3 88 - 5 62 Million/uL    Hemoglobin 4 6 (LL) 12 0 - 17 0 g/dL    Hematocrit 14 9 (L) 36 5 - 49 3 %     (H) 82 - 98 fL    MCH 41 1 (H) 26 8 - 34 3 pg    MCHC 30 9 (L) 31 4 - 37 4 g/dL    RDW 27 3 (H) 11 6 - 15 1 %    MPV 10 3 8 9 - 12 7 fL    Platelets 507 590 - 158 Thousands/uL    nRBC 0 /100 WBCs    Neutrophils Relative 6 (L) 43 - 75 %    Immat GRANS % 0 0 - 2 %    Lymphocytes Relative 90 (H) 14 - 44 %    Monocytes Relative 4 4 - 12 %    Eosinophils Relative 0 0 - 6 %    Basophils Relative 0 0 - 1 %   Comprehensive metabolic panel    Collection Time: 04/09/21 12:45 AM   Result Value Ref Range    Sodium 128 (L) 136 - 145 mmol/L    Potassium 4 2 3 5 - 5 3 mmol/L Chloride 95 (L) 100 - 108 mmol/L    CO2 24 21 - 32 mmol/L    ANION GAP 9 4 - 13 mmol/L    BUN 10 5 - 25 mg/dL    Creatinine 0 77 0 60 - 1 30 mg/dL    Glucose 127 65 - 140 mg/dL    Calcium 8 1 (L) 8 3 - 10 1 mg/dL    AST 21 5 - 45 U/L    ALT 18 12 - 78 U/L    Alkaline Phosphatase 88 46 - 116 U/L    Total Protein 7 6 6 4 - 8 2 g/dL    Albumin 3 7 3 5 - 5 0 g/dL    Total Bilirubin 1 97 (H) 0 20 - 1 00 mg/dL    eGFR 93 ml/min/1 73sq m   Troponin I    Collection Time: 04/09/21 12:45 AM   Result Value Ref Range    Troponin I <0 02 <=0 04 ng/mL   D-Dimer    Collection Time: 04/09/21 12:45 AM   Result Value Ref Range    D-Dimer, Quant 0 69 (H) <0 50 ug/ml FEU   Iron Saturation %    Collection Time: 04/09/21 12:45 AM   Result Value Ref Range    Iron Saturation 33 %    TIBC 324 250 - 450 ug/dL    Iron 107 65 - 175 ug/dL   Ferritin    Collection Time: 04/09/21 12:45 AM   Result Value Ref Range    Ferritin 484 (H) 8 - 388 ng/mL   Type and screen    Collection Time: 04/09/21  2:24 AM   Result Value Ref Range    ABO Grouping A     Rh Factor Positive     Antibody Screen Negative     Specimen Expiration Date [id number]    Troponin I    Collection Time: 04/09/21  3:50 AM   Result Value Ref Range    Troponin I <0 02 <=0 04 ng/mL   Basic metabolic panel    Collection Time: 04/09/21  6:32 AM   Result Value Ref Range    Sodium 134 (L) 136 - 145 mmol/L    Potassium 4 2 3 5 - 5 3 mmol/L    Chloride 100 100 - 108 mmol/L    CO2 26 21 - 32 mmol/L    ANION GAP 8 4 - 13 mmol/L    BUN 10 5 - 25 mg/dL    Creatinine 0 76 0 60 - 1 30 mg/dL    Glucose 119 65 - 140 mg/dL    Calcium 8 2 (L) 8 3 - 10 1 mg/dL    eGFR 94 ml/min/1 73sq m   CBC (With Platelets)    Collection Time: 04/09/21  6:32 AM   Result Value Ref Range    WBC 77 59 (HH) 4 31 - 10 16 Thousand/uL    RBC 1 11 (L) 3 88 - 5 62 Million/uL    Hemoglobin 4 6 (LL) 12 0 - 17 0 g/dL    Hematocrit 14 9 (L) 36 5 - 49 3 %     (H) 82 - 98 fL    MCH 41 4 (H) 26 8 - 34 3 pg    MCHC 30 9 (L) 31 4 - 37 4 g/dL    RDW 27 0 (H) 11 6 - 15 1 %    Platelets 234 230 - 897 Thousands/uL    MPV 10 1 8 9 - 12 7 fL   Troponin I    Collection Time: 04/09/21  6:32 AM   Result Value Ref Range    Troponin I <0 02 <=0 04 ng/mL   ECG 12 lead    Collection Time: 04/09/21  6:33 AM   Result Value Ref Range    Ventricular Rate 69 BPM    Atrial Rate 227 BPM    VT Interval  ms    QRSD Interval 94 ms    QT Interval 378 ms    QTC Interval 405 ms    P Axis  degrees    QRS Axis 74 degrees    T Wave Axis 78 degrees   Prepare Leukoreduced RBC: 1 Units    Collection Time: 04/09/21 10:57 AM   Result Value Ref Range    Unit Product Code J0039X75     Unit Number [id number]     Unit ABO A     Unit RH NEG     Crossmatch Compatible     Unit Dispense Status Issued    Lactate dehydrogenase    Collection Time: 04/09/21 11:12 AM   Result Value Ref Range     (H) 81 - 234 U/L   Blood typing, RBC antigens    Collection Time: 04/09/21 11:22 AM   Result Value Ref Range    C ANTIGEN Positive     E ANTIGEN Negative     c ANTIGEN Positive     CARLEY ANTIGEN Negative    Prepare Leukoreduced RBC: 3 Units    Collection Time: 04/09/21 12:23 PM   Result Value Ref Range    Unit Product Code M6193U01     Unit Number [id number]     Unit ABO A     Unit RH NEG     Crossmatch Compatible     Unit Dispense Status Return to [facility]     Unit Product Code X4809K15     Unit Number [id number]     Unit ABO A     Unit RH NEG     Crossmatch Compatible     Unit Dispense Status Return to [facility]     Unit Product Code C3886E02     Unit Number [id number]     Unit ABO A     Unit [facility] NEG     Crossmatch Compatible     Unit Dispense Status Issued     Unit Product Code U1729V65     Unit Number [id number]     Unit ABO A     Unit [facility] NEG     Crossmatch Compatible     Unit Dispense Status Issued          Xr Chest 1 View Portable    Result Date: 4/9/2021  Narrative: CHEST INDICATION:   dyspnea   COMPARISON:  2/22/2021 EXAM PERFORMED/VIEWS:  XR CHEST PORTABLE Single view FINDINGS: Cardiomediastinal silhouette remains mildly enlarged The lungs are clear  No pneumothorax or pleural effusion  Osseous structures appear within normal limits for patient age  Chronic posttraumatic right chest wall deformity with old rib fractures     Impression: No acute cardiopulmonary disease  Stable Workstation performed: [id number]       Assessment: 1  CLL    2  Relatively acute onset of macrocytic anemia  Plan:  A 77-year-old gentleman with history as described above  He has severe microcytic anemia with hemoglobin 4 6,   He has no noticeable sign of GI bleed lately  This is 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

## 2021-04-09 NOTE — ED PROVIDER NOTES
CERTIFICATE OF RETURN TO WORK    October 31, 2017      Re:   Bianca De La Paz  [address] [facility] [id number]                        This is to certify that Bianca De La Paz has been under my care.       RESTRICTIONS: please excuse from work 10/30, 10/31 and 11/1/2017      REMARKS:         SIGNATURE:___________________________________________,   10/31/2017      JOSE L Mcdonald Kennebeck, PA-C  Family Medicine  [address] [place] [id number]  [phone]   History  Chief Complaint   Patient presents with    Shortness of Breath     Per EMS pt was out for a walking and c/o sob  Pt states o2 sat dropped down to the 88%  Pt currently on 4L nc and sating 99%     Helene Vyas is a 78y o  year old male with PMH of CLL presenting to the [facility] ED for dyspnea  Symptoms started two weeks prior to arrival  Patient notes dyspnea brought on by exertion and relieved with rest  Also notes chest burning sensation brought on while walking that resolves within minutes of rest  This evening patient, went for walk and became profoundly short of rest  Patient noted to have O2 sat in 80's per EMS for which he was placed on 4L NC  Does not wear O2 supplementation at baseline  Patient intubated for COVID19 respiratory failure three months ago  Received first dose of moderna COVID19 immunization this past week  Patient denies associated abdominal pain, dark/tarry stools or bloody stools  Not currently on anticoagulants  Patient not currently on AC/AP medications  History provided by:  Patient   used: No    Shortness of Breath  Associated symptoms: chest pain    Associated symptoms: no abdominal pain, no cough, no fever, no headaches, no rash, no vomiting and no wheezing        Prior to Admission Medications   Prescriptions Last Dose Informant Patient Reported? Taking?    Multiple Vitamin (multivitamin) tablet 4/8/2021 at Unknown time Self No Yes   Sig: Take 1 tablet by mouth daily   Omega-3 Fatty Acids (FISH OIL) 1,000 mg 4/8/2021 at Unknown time Self Yes Yes   Sig: Take by mouth daily   amLODIPine (NORVASC) 5 mg tablet 4/8/2021 at Unknown time Self No Yes   Sig: TAKE ONE TABLET BY MOUTH EVERY EVENING   aspirin 81 MG tablet 4/8/2021 at Unknown time Self Yes Yes   Sig: Take 1 tablet by mouth daily   atorvastatin (LIPITOR) 20 mg tablet  Self Yes No   Sig: TAKE ONE TABLET BY MOUTH ONCE EVERY DAY   metoprolol tartrate (LOPRESSOR) 25 mg tablet 4/8/2021 at Unknown time No Yes   Sig: Take 1 tablet (25 mg total) by mouth every 12 (twelve) hours   omeprazole (PriLOSEC) 40 MG capsule 4/8/2021 at Unknown time Self No Yes   Sig: TAKE ONE CAPSULE BY MOUTH ONCE EVERY DAY   senna-docusate sodium (SENOKOT S) 8 6-50 mg per tablet 4/8/2021 at Unknown time Self No Yes   Sig: Take 1 tablet by mouth daily at bedtime   sertraline (ZOLOFT) 50 mg tablet 4/8/2021 at Unknown time Self No Yes   Sig: TAKE ONE TABLET BY MOUTH EVERY DAY   tamsulosin (FLOMAX) 0 4 mg 4/8/2021 at Unknown time Self No Yes   Sig: Take 1 capsule (0 4 mg total) by mouth daily with dinner CORRECTED SCRIPT! Note change in SIG and QUANTITY  Please process accordingly    Thank you! (9/1/20)      Facility-Administered Medications: None       Past Medical History:   Diagnosis Date    Anxiety     BPH (benign prostatic hypertrophy)     Cancer (HCC)     CLL    CLL (chronic lymphocytic leukemia) ([facility] [address] )     2009    Colon polyp     Concussion     Resolved: 08/22/16    Depression     Gastrointestinal hemorrhage     Last assessed: 08/27/13    GERD (gastroesophageal reflux disease)     Hearing loss of aging     Hiatal hernia     History of transfusion     Hyperlipidemia     Hypertension     Iron deficiency anemia     Microscopic hematuria     Last assessed: 06/28/13    OA (osteoarthritis)     right hip    Pneumothorax     Prostatitis     Pulmonary hypertension (HCC)     Seasonal allergies     UTI (urinary tract infection)     in past    Wears glasses        Past Surgical History:   Procedure Laterality Date    COLONOSCOPY      CYSTOSCOPY  10/09/2014    Diagnostic    CYSTOSCOPY  06/29/2020    FRACTURE SURGERY      left lower arm    FRACTURE SURGERY      left femur    HERNIA REPAIR      JOINT REPLACEMENT Right     hip    OTHER SURGICAL HISTORY  03/2011    Spinal anesthesia epidural    [facility] TOTAL HIP ARTHROPLASTY Right 9/29/2017    Procedure: ARTHROPLASTY HIP TOTAL ANTERIOR;  Surgeon: Dayanara Vasquez MD;  Location: [facility];  Service: Orthopedics    TONSILLECTOMY AND ADENOIDECTOMY      TRANSURETHRAL RESECTION OF PROSTATE      x 2    WRIST SURGERY         Family History   Problem Relation Age of Onset    No Known Problems Mother     Heart attack Father     Diabetes Brother     Prostate cancer Brother      I have reviewed and agree with the history as documented  E-Cigarette/Vaping    E-Cigarette Use Never User      E-Cigarette/Vaping Substances    Nicotine No     THC No     CBD No     Flavoring No     Other No     Unknown No      Social History     Tobacco Use    Smoking status: Former Smoker     Packs/day: 1      Years: 15      Pack years: 15      Types: Cigarettes     Start date:      Quit date: 1980     Years since quittin 11    Smokeless tobacco: Never Used   Substance Use Topics    Alcohol use: Yes     Alcohol/week: 10 0 standard drinks     Types: 10 Cans of beer per week     Frequency: 4 or more times a week     Drinks per session: 3 or 4    Drug use: Not Currently     Types: Marijuana        Review of Systems   Constitutional: Negative for chills, fatigue and fever  HENT: Negative for congestion, nosebleeds and rhinorrhea  Eyes: Negative for visual disturbance  Respiratory: Positive for shortness of breath  Negative for cough, chest tightness and wheezing  Cardiovascular: Positive for chest pain  Negative for leg swelling  Gastrointestinal: Negative for abdominal distention, abdominal pain, blood in stool, diarrhea, nausea and vomiting  Endocrine: Negative for polyuria  Genitourinary: Negative for dysuria and hematuria  Musculoskeletal: Negative for arthralgias, gait problem and joint swelling  Skin: Negative for rash  Neurological: Negative for seizures, syncope, weakness, light-headedness and headaches  Hematological: Does not bruise/bleed easily  Psychiatric/Behavioral: Negative for behavioral problems and confusion     All other systems reviewed and are negative  Physical Exam  ED Triage Vitals   Temperature Pulse Respirations Blood Pressure SpO2   04/09/21 0019 04/09/21 0019 04/09/21 0019 04/09/21 0019 04/09/21 0019   97 7 °F (36 5 °C) 74 20 136/62 99 %      Temp Source Heart Rate Source Patient Position - Orthostatic VS BP Location FiO2 (%)   04/09/21 0019 04/09/21 0019 04/09/21 0019 04/09/21 0019 --   Oral Monitor Lying Right arm       Pain Score       04/09/21 0139       No Pain             Orthostatic Vital Signs  Vitals:    04/09/21 1539 04/09/21 1709 04/09/21 2101 04/09/21 2220   BP: 115/56 128/70 129/71 130/66   Pulse: 67 68 65 59   Patient Position - Orthostatic VS:           Physical Exam  Vitals signs and nursing note reviewed  Constitutional:       General: He is not in acute distress  Appearance: He is well-developed  He is not ill-appearing, toxic-appearing or diaphoretic  HENT:      Head: Normocephalic and atraumatic  Nose: No congestion or rhinorrhea  Eyes:      General:         Right eye: No discharge  Left eye: No discharge  Neck:      Musculoskeletal: Normal range of motion and neck supple  No neck rigidity  Cardiovascular:      Rate and Rhythm: Normal rate and regular rhythm  Pulmonary:      Effort: Pulmonary effort is normal  No tachypnea or respiratory distress  Breath sounds: Normal breath sounds  No wheezing, rhonchi or rales  Comments: 4L NC  Abdominal:      General: Bowel sounds are normal  There is no distension  Palpations: Abdomen is soft  Tenderness: There is no abdominal tenderness  There is no guarding or rebound  Genitourinary:     Rectum: Guaiac result negative (stool light brown, hemoccult negative)  Musculoskeletal:      Right lower leg: No edema  Left lower leg: No edema  Skin:     General: Skin is warm  Capillary Refill: Capillary refill takes less than 2 seconds  Neurological:      Mental Status: He is alert and oriented to person, place, and time  Psychiatric:         Mood and Affect: Mood normal          Behavior: Behavior normal          ED Medications  Medications   atorvastatin (LIPITOR) tablet 20 mg (20 mg Oral Given 4/9/21 1816)   metoprolol tartrate (LOPRESSOR) tablet 25 mg (25 mg Oral Given 4/9/21 2104)   tamsulosin (FLOMAX) capsule 0 4 mg (0 4 mg Oral Given 4/9/21 1816)   sertraline (ZOLOFT) tablet 50 mg (50 mg Oral Given 4/9/21 1148)   senna-docusate sodium (SENOKOT S) 8 6-50 mg per tablet 1 tablet (1 tablet Oral Given 4/9/21 2112)   multivitamin-minerals (CENTRUM) tablet 1 tablet (1 tablet Oral Given 4/9/21 1148)   fish oil capsule 1,000 mg (1,000 mg Oral Given 4/9/21 1148)   ondansetron (ZOFRAN) injection 4 mg (has no administration in time range)   pantoprazole (PROTONIX) injection 40 mg (40 mg Intravenous Given 4/9/21 2104)   aspirin chewable tablet 162 mg (162 mg Oral Given 4/9/21 0045)   ondansetron (ZOFRAN) injection 4 mg (4 mg Intravenous Given 4/9/21 0238)   LORazepam (ATIVAN) tablet 0 5 mg (0 5 mg Oral Given 4/9/21 0348)   LORazepam (ATIVAN) tablet 0 25 mg (0 25 mg Oral Given 4/9/21 2225)       Diagnostic Studies  Results Reviewed     Procedure Component Value Units Date/Time    Manual Differential(PHLEBS Do Not Order) [[id number]]  (Abnormal) Collected: 04/09/21 1326    Lab Status: Final result Specimen: Blood from Arm, Right Updated: 04/09/21 1638     Segmented % 7 %      Bands % 1 %      Lymphocytes % 84 %      Monocytes % 0 %      Eosinophils, % 1 %      Basophils % 0 %      Atypical Lymphocytes % 7 %      Absolute Neutrophils 5 95 Thousand/uL      Lymphocytes Absolute 62 44 Thousand/uL      Monocytes Absolute 0 00 Thousand/uL      Eosinophils Absolute 0 74 Thousand/uL      Basophils Absolute 0 00 Thousand/uL      Total Counted 100     Smudge Cells Present     Anisocytosis Present     Macrocytes Present     Ovalocytes Present     Poikilocytes Present     Polychromasia Present     Target Cells Present     Tear Drop Cells Present Platelet Estimate Adequate    CBC and differential [[id number]]  (Abnormal) Collected: 04/09/21 1326    Lab Status: Final result Specimen: Blood from Arm, Right Updated: 04/09/21 1638     WBC 74 33 Thousand/uL      RBC 1 85 Million/uL      Hemoglobin 6 7 g/dL      Hematocrit 20 7 %       fL      MCH 36 2 pg      MCHC 32 4 g/dL      RDW 33 1 %      MPV 9 8 fL      Platelets 920 Thousands/uL      nRBC 0 /100 WBCs     Narrative: This is an appended report  These results have been appended to a previously verified report  Cold agglutinin titer [[id number]]     Lab Status: No result Specimen: Blood     Lactate dehydrogenase [[id number]]  (Abnormal) Collected: 04/09/21 1112    Lab Status: Final result Specimen: Blood from Arm, Left Updated: 04/09/21 1147      U/L     Retic Count [[id number]]     Lab Status: No result Specimen: Blood     Haptoglobin [[id number]] Collected: 04/09/21 1112    Lab Status:  In process Specimen: Blood from Arm, Left Updated: 04/09/21 1122    CBC (With Platelets) [[id number]]  (Abnormal) Collected: 04/09/21 0632    Lab Status: Final result Specimen: Blood from Arm, Right Updated: 04/09/21 0720     WBC 77 59 Thousand/uL      RBC 1 11 Million/uL      Hemoglobin 4 6 g/dL      Hematocrit 14 9 %       fL      MCH 41 4 pg      MCHC 30 9 g/dL      RDW 27 0 %      Platelets 920 Thousands/uL      MPV 10 1 fL     Troponin I [[id number]]  (Normal) Collected: 04/09/21 0632    Lab Status: Final result Specimen: Blood from Arm, Right Updated: 04/09/21 0711     Troponin I <0 02 ng/mL     Basic metabolic panel [[id number]]  (Abnormal) Collected: 04/09/21 7672    Lab Status: Final result Specimen: Blood from Arm, Right Updated: 04/09/21 0706     Sodium 134 mmol/L      Potassium 4 2 mmol/L      Chloride 100 mmol/L      CO2 26 mmol/L      ANION GAP 8 mmol/L      BUN 10 mg/dL      Creatinine 0 76 mg/dL      Glucose 119 mg/dL      Calcium 8 2 mg/dL      eGFR 94 ml/min/1 73sq m     Narrative: National Kidney Disease Foundation guidelines for Chronic Kidney Disease (CKD):     Stage 1 with normal or high GFR (GFR > 90 mL/min/1 73 square meters)    Stage 2 Mild CKD (GFR = 60-89 mL/min/1 73 square meters)    Stage 3A Moderate CKD (GFR = 45-59 mL/min/1 73 square meters)    Stage 3B Moderate CKD (GFR = 30-44 mL/min/1 73 square meters)    Stage 4 Severe CKD (GFR = 15-29 mL/min/1 73 square meters)    Stage 5 End Stage CKD (GFR <15 mL/min/1 73 square meters)  Note: GFR calculation is accurate only with a steady state creatinine    Iron Saturation % [[id number]] Collected: 04/09/21 0045    Lab Status: Final result Specimen: Blood from Arm, Right Updated: 04/09/21 0454     Iron Saturation 33 %      TIBC 324 ug/dL      Iron 107 ug/dL     Ferritin [[id number]]  (Abnormal) Collected: 04/09/21 0045    Lab Status: Final result Specimen: Blood from Arm, Right Updated: 04/09/21 0454     Ferritin 484 ng/mL     Troponin I [[id number]]  (Normal) Collected: 04/09/21 0350    Lab Status: Final result Specimen: Blood from Arm, Right Updated: 04/09/21 0419     Troponin I <0 02 ng/mL     CBC and differential [[id number]]  (Abnormal) Collected: 04/09/21 0045    Lab Status: Final result Specimen: Blood from Arm, Right Updated: 04/09/21 0410     WBC 89 58 Thousand/uL      RBC 1 12 Million/uL      Hemoglobin 4 6 g/dL      Hematocrit 14 9 %       fL      MCH 41 1 pg      MCHC 30 9 g/dL      RDW 27 3 %      MPV 10 3 fL      Platelets 069 Thousands/uL      nRBC 0 /100 WBCs      Neutrophils Relative 6 %      Immat GRANS % 0 %      Lymphocytes Relative 90 %      Monocytes Relative 4 %      Eosinophils Relative 0 %      Basophils Relative 0 %     D-Dimer [[id number]]  (Abnormal) Collected: 04/09/21 0045    Lab Status: Final result Specimen: Blood from Arm, Right Updated: 04/09/21 0130     D-Dimer, Quant 0 69 ug/ml FEU     Troponin I [[id number]]  (Normal) Collected: 04/09/21 0045    Lab Status: Final result Specimen: Blood from Arm, Right Updated: 04/09/21 0130     Troponin I <0 02 ng/mL     Comprehensive metabolic panel [[id number]]  (Abnormal) Collected: 04/09/21 0045    Lab Status: Final result Specimen: Blood from Arm, Right Updated: 04/09/21 0129     Sodium 128 mmol/L      Potassium 4 2 mmol/L      Chloride 95 mmol/L      CO2 24 mmol/L      ANION GAP 9 mmol/L      BUN 10 mg/dL      Creatinine 0 77 mg/dL      Glucose 127 mg/dL      Calcium 8 1 mg/dL      AST 21 U/L      ALT 18 U/L      Alkaline Phosphatase 88 U/L      Total Protein 7 6 g/dL      Albumin 3 7 g/dL      Total Bilirubin 1 97 mg/dL      eGFR 93 ml/min/1 73sq m     Narrative:      [place] guidelines for Chronic Kidney Disease (CKD):     Stage 1 with normal or high GFR (GFR > 90 mL/min/1 73 square meters)    Stage 2 Mild CKD (GFR = 60-89 mL/min/1 73 square meters)    Stage 3A Moderate CKD (GFR = 45-59 mL/min/1 73 square meters)    Stage 3B Moderate CKD (GFR = 30-44 mL/min/1 73 square meters)    Stage 4 Severe CKD (GFR = 15-29 mL/min/1 73 square meters)    Stage 5 End Stage CKD (GFR <15 mL/min/1 73 square meters)  Note: GFR calculation is accurate only with a steady state creatinine                 CT abdomen pelvis wo contrast   Final Result by Diana Chong DO (04/09 2010)      Diffuse urinary bladder wall thickening without surrounding inflammatory change  Correlate clinically for cystitis  Several bladder diverticula are seen as on the prior study  Splenomegaly  Colonic diverticulosis without evidence for acute diverticulitis  Workstation performed: [id number]         XR chest 1 view portable   Final Result by Chan Aggarwal MD (04/09 1051)   No acute cardiopulmonary disease        Stable            Workstation performed: [id number]               Procedures  Procedures      ED Course  ED Course as of Apr 10 0300   Fri Apr 09, 2021   4803 Procedure Note: EKG  Date/Time: 04/09/21 12:37 AM   Interpreted by: Kaylin Kaiser Vickey Castro DO  Indications / Diagnosis: Dyspnea  ECG reviewed by me, the ED Provider: yes   The EKG demonstrates:  Rhythm: normal sinus rhythm 71 BPM  Intervals: Normal NE and QT intervals  Axis: Normal axis  QRS/Blocks: Normal QRS  ST Changes: No acute ST/T waves changes  No CHRISTOPHER  No TWI  Comparison: Compared to prior EKG performed on 01/19/2021                  Identification of Seniors at Risk      Most Recent Value   (ISAR) Identification of Seniors at Risk   Before the illness or injury that brought you to the Emergency, did you need someone to help you on a regular basis? 0 Filed at: 04/09/2021 0021   In the last 24 hours, have you needed more help than usual?  1 Filed at: 04/09/2021 0021   Have you been hospitalized for one or more nights during the past 6 months? 0 Filed at: 04/09/2021 0021   In general, do you see well?  0 Filed at: 04/09/2021 0021   In general, do you have serious problems with your memory? 0 Filed at: 04/09/2021 0021   Do you take more than three different medications every day? 1 Filed at: 04/09/2021 0021   ISAR Score  2 Filed at: 04/09/2021 0021                    SBIRT 22yo+      Most Recent Value   SBIRT (25 yo +)   In order to provide better care to our patients, we are screening all of our patients for alcohol and drug use  Would it be okay to ask you these screening questions?   Unable to answer at this time Filed at: 04/09/2021 0032          Wells' Criteria for PE      Most Recent Value   Wells' Criteria for PE   Clinical signs and symptoms of DVT  0 Filed at: 04/09/2021 0107   PE is primary diagnosis or equally likely  0 Filed at: 04/09/2021 0107   HR >100  0 Filed at: 04/09/2021 0107   Immobilization at least 3 days or Surgery in the previous 4 weeks  0 Filed at: 04/09/2021 0107   Previous, objectively diagnosed PE or DVT  0 Filed at: 04/09/2021 0107   Hemoptysis  0 Filed at: 04/09/2021 0107   Malignancy with treatment within 6 months or palliative  1 Filed at: 04/09/2021 0107 Sage' Criteria Total  1 Filed at: 04/09/2021 0107            MDM  Number of Diagnoses or Management Options  Acute anemia:   CLL (chronic lymphocytic leukemia) ([facility] ):   Dyspnea on exertion:   Hyponatremia:   Stable angina [facility]):   Diagnosis management comments: 78 y o  male presenting for dyspnea with exertion  Will check labs and CXR  Hb result reviewed with patient and written consent obtained for blood transfusion  Due to symptomatic anemia requiring transfusion, will admit for further evaluation and management  Patient care discussed with Dr Milagro Richard who will assume care and admit patient to the hospital  I have discussed with the patient our recommendation of inpatient admission for further medical care  I have answered all of the patient's questions and concerns   The patient is in agreement with the plan to proceed with admission to the hospital         Amount and/or Complexity of Data Reviewed  Clinical lab tests: ordered and reviewed  Tests in the radiology section of CPT®: reviewed and ordered  Review and summarize past medical records: yes  Discuss the patient with other providers: yes  Independent visualization of images, tracings, or specimens: yes    Patient Progress  Patient progress: stable      Disposition  Final diagnoses:   Acute anemia   CLL (chronic lymphocytic leukemia) ([place] [address] )   Dyspnea on exertion   Stable angina ([place] [address] )   Hyponatremia     Time reflects when diagnosis was documented in both MDM as applicable and the Disposition within this note     Time User Action Codes Description Comment    4/9/2021  2:01 AM Heather Raisin Add [D64 9] Acute anemia     4/9/2021  2:01 AM Heather Raisin Add [C91 10] CLL (chronic lymphocytic leukemia) ([place] [address] )     4/9/2021  2:01 AM Heather Raisin Add [R06 00] Dyspnea on exertion     4/9/2021  2:01 AM Heather Raisin Add [I20 8] Stable angina ([place] [address] )     4/9/2021  2:01 AM Heather Raisin Add [E87 1] Hyponatremia       ED Disposition     ED Disposition Condition Date/Time Comment    Admit Stable Fri Apr 9, 2021  2:01 AM Case was discussed with Dr Zeeshan Hong and the patient's admission status was agreed to be Admission Status: inpatient status to the service of Dr Zeeshan Hong  Follow-up Information    None         Current Discharge Medication List      CONTINUE these medications which have NOT CHANGED    Details   amLODIPine (NORVASC) 5 mg tablet TAKE ONE TABLET BY MOUTH EVERY EVENING  Qty: 90 tablet, Refills: 3    Comments: This prescription was filled on 5/18/2020  Any refills authorized will be placed on file  Associated Diagnoses: Pulmonary hypertension (HCC)      aspirin 81 MG tablet Take 1 tablet by mouth daily      metoprolol tartrate (LOPRESSOR) 25 mg tablet Take 1 tablet (25 mg total) by mouth every 12 (twelve) hours  Qty: 180 tablet, Refills: 3    Associated Diagnoses: COVID-19      Multiple Vitamin (multivitamin) tablet Take 1 tablet by mouth daily  Qty: 90 tablet, Refills: 3    Associated Diagnoses: COVID-19      Omega-3 Fatty Acids (FISH OIL) 1,000 mg Take by mouth daily      omeprazole (PriLOSEC) 40 MG capsule TAKE ONE CAPSULE BY MOUTH ONCE EVERY DAY  Qty: 90 capsule, Refills: 3    Associated Diagnoses: Gastroesophageal reflux disease      senna-docusate sodium (SENOKOT S) 8 6-50 mg per tablet Take 1 tablet by mouth daily at bedtime  Qty: 30 tablet, Refills: 0    Associated Diagnoses: COVID-19      sertraline (ZOLOFT) 50 mg tablet TAKE ONE TABLET BY MOUTH EVERY DAY  Qty: 90 tablet, Refills: 3    Associated Diagnoses: Depression, unspecified depression type      tamsulosin (FLOMAX) 0 4 mg Take 1 capsule (0 4 mg total) by mouth daily with dinner CORRECTED SCRIPT! Note change in SIG and QUANTITY  Please process accordingly  Thank you! (9/1/20)  Qty: 90 capsule, Refills: 1    Comments: This prescription was filled on 6/22/2020  Any refills authorized will be placed on file    Associated Diagnoses: BPH with obstruction/lower urinary tract symptoms atorvastatin (LIPITOR) 20 mg tablet TAKE ONE TABLET BY MOUTH ONCE EVERY DAY           No discharge procedures on file  PDMP Review       Value Time User    PDMP Reviewed  Yes 4/9/2021  2:55 AM Alexx Howard DO           ED Provider  Attending physically available and evaluated Susanne Pica  I managed the patient along with the ED Attending      Electronically Signed by         Carson Gamez DO  04/10/21 0930

## 2021-04-09 NOTE — ASSESSMENT & PLAN NOTE
· Reason baseline hemoglobin has been approximately 10, now presented with dyspnea on exertion and found with hemoglobin 4 6  · Patient with known history of CML, additionally with history of gastric vascular ectasia however denies any visible bleeding  · To received 3 units PRBCs, will repeat hemoglobin following transfusion to assess response then serial hemoglobins  · Check iron panel  · GI consultation, will consider Hematology evaluation  · Hold AP/AC for now

## 2021-04-09 NOTE — ASSESSMENT & PLAN NOTE
· POA with SOB found of have hgb 4 6 on admission   · Known CLL diagnosed in March 2010 under care of Dr Amadou Morton   · additionally with history of gastric vascular ectasia however denies any visible bleeding  · GI consulted going for EGD today   · Receiving first unit out of 3 PRBC ordered now   · Repeat H&H after transfusion   · Monitor volume status   · Check iron panel  · Check STAT peripheral smear, LDH, haptoglobin   · Consult Hematology   · Hold [facility] for now

## 2021-04-09 NOTE — ED NOTES
Per blood bank, patient has "special" type of blood and will take longer to prepare units        Kiesha Zepeda RN  04/09/21 9115

## 2021-04-09 NOTE — H&P
[facility]  H&P- Fabrizio Nagy 1950, 78 y o  male MRN: [id number]  Unit/Bed#: ED 20 Encounter: [id number]  Primary Care Provider: Franco Lion MD   Date and time admitted to hospital: 4/9/2021 12:14 AM    * Acute on chronic anemia  Assessment & Plan  · Reason baseline hemoglobin has been approximately 10, now presented with dyspnea on exertion and found with hemoglobin 4 6  · Patient with known history of CML, additionally with history of gastric vascular ectasia however denies any visible bleeding  · To received 3 units PRBCs, will repeat hemoglobin following transfusion to assess response then serial hemoglobins  · Check iron panel  · GI consultation, will consider Hematology evaluation  · Hold AP/AC for now    Dyspnea on exertion  Assessment & Plan  · Developing over past 1 week, history of pulmonary hypertension noted however suspect this is more likely in setting of profound anemia and will assess response to PRBC transfusions  · Additionally will trend troponin and monitor on telemetry for now    Hyponatremia  Assessment & Plan  · Sodium 128 on admission, did have prior chronic hyponatremia 131-134 noted however some recent normal values  · Will assess response to PRBC transfusion, monitor with BMP    Pulmonary hypertension ([facility] [address] )  Assessment & Plan  · Follows with Dr Silke Jaeger, echo 01/2021 EF 60% with peak pressure 75 mmHg  · May need IV diuresis if shortness of breath develops during blood transfusion    Esophageal reflux  Assessment & Plan  · Continue PPI for now    CLL (chronic lymphocytic leukemia) ([facility] [address] )  Assessment & Plan  · Follows with Dr Latrell Cruz, currently watchful waiting      VTE Prophylaxis: Pharmacologic VTE Prophylaxis contraindicated due to Profound anemia  / sequential compression device   Code Status: Level 1 - Full Code  POLST: POLST form is not discussed and not completed at this time      Anticipated Length of Stay:  Patient will be admitted on an Inpatient basis with an anticipated length of stay of  greater than 2 midnights  Justification for Hospital Stay: Please see detailed plans noted above  Chief Complaint:     Shortness of breath with exertion  History of Present Illness:  China Maher is a 78 y o  male who presents with shortness of breath on exertion  Has a past medical history significant for pulmonary hypertension currently followed by pulmonology, CLL currently with watchful waiting, esophageal reflux with gastric vascular ectasia noted s/p prior intervention  Additionally with prolonged hospitalization 01/2021 with acute hypoxic respiratory failure in the setting of COVID-19 infection requiring intubation with successful extubation  Reported dyspnea on exertion has been worsening over the past 2 weeks to where patient became profoundly short of breath even with brief walk of approximately 100 ft this evening which prompted presentation  Additionally did note that episode was associated with brief chest pressure described as central, nonradiating, and resolved almost immediately with rest   Denies fevers/chills, dizziness/lightheadedness, shortness of breath at rest, abdominal pain/nausea/vomiting/diarrhea, lower extremity edema, weight gain, orthopnea, melena or hematochezia  During ED evaluation noted hypoxic requiring 4 L NC, and labs reveal hemoglobin of 4 6 for which patient is to receive 3 units PRBCs and is admitted for further management of acute on chronic anemia  Currently, patient is lying in bed in no acute distress and comfortable appearing  Denies shortness of breath or chest pain/pressure at this time while resting  Does admit to some anxiety this time      Review of Systems:    Constitutional:  Denies fever or chills   Eyes:  Denies change in visual acuity   HENT:  Denies nasal congestion or sore throat   Respiratory:  Denies cough but endorsed dyspnea on exertion  Cardiovascular:  Denies chest pain or edema GI:  Denies abdominal pain, nausea, vomiting, bloody stools or diarrhea   :  Denies dysuria   Musculoskeletal:  Denies back pain or joint pain   Integument:  Denies rash   Neurologic:  Denies headache, focal weakness or sensory changes   Endocrine:  Denies polyuria or polydipsia   Lymphatic:  Denies swollen glands   Psychiatric:  Denies depression but endorses anxiety    Past Medical and Surgical History:   Past Medical History:   Diagnosis Date    Anxiety     BPH (benign prostatic hypertrophy)     Cancer (HCC)     CLL    CLL (chronic lymphocytic leukemia) ([facility] )     2009    Colon polyp     Concussion     Resolved: 08/22/16    Depression     Gastrointestinal hemorrhage     Last assessed: 08/27/13    GERD (gastroesophageal reflux disease)     Hearing loss of aging     Hiatal hernia     History of transfusion     Hyperlipidemia     Hypertension     Iron deficiency anemia     Microscopic hematuria     Last assessed: 06/28/13    OA (osteoarthritis)     right hip    Pneumothorax     Prostatitis     Pulmonary hypertension ([facility] )     Seasonal allergies     UTI (urinary tract infection)     in past    Wears glasses      Past Surgical History:   Procedure Laterality Date    COLONOSCOPY      CYSTOSCOPY  10/09/2014    Diagnostic    CYSTOSCOPY  06/29/2020    FRACTURE SURGERY      left lower arm    FRACTURE SURGERY      left femur    HERNIA REPAIR      JOINT REPLACEMENT Right     hip    OTHER SURGICAL HISTORY  03/2011    Spinal anesthesia epidural    [place] TOTAL HIP ARTHROPLASTY Right 9/29/2017    Procedure: ARTHROPLASTY HIP TOTAL ANTERIOR;  Surgeon: Brady Brown MD;  Location: [facility];  Service: Orthopedics    TONSILLECTOMY AND ADENOIDECTOMY      TRANSURETHRAL RESECTION OF PROSTATE      x 2    WRIST SURGERY         Meds/Allergies:    Current Facility-Administered Medications:     amLODIPine (NORVASC) tablet 5 mg, 5 mg, Oral, QPM, Tirso Landry DO    atorvastatin (LIPITOR) tablet 20 mg, 20 mg, Oral, Daily With Dinner, Calloway [place], DO    fish oil capsule 1,000 mg, 1,000 mg, Oral, Daily, Calloway [place], DO    metoprolol tartrate (LOPRESSOR) tablet 25 mg, 25 mg, Oral, Q12H [address], Calloway [place], DO    multivitamin-minerals (CENTRUM) tablet 1 tablet, 1 tablet, Oral, Daily, Calloway Anastasia, DO    ondansetron [facility]) injection 4 mg, 4 mg, Intravenous, Q6H PRN, Calloway [place], DO    pantoprazole (PROTONIX) EC tablet 40 mg, 40 mg, Oral, Early Morning, Calloway Anastasia, DO    senna-docusate sodium (SENOKOT S) 8 6-50 mg per tablet 1 tablet, 1 tablet, Oral, HS, Calloway [place], DO    sertraline (ZOLOFT) tablet 50 mg, 50 mg, Oral, Daily, Calloway Anastasia, DO    tamsulosin (FLOMAX) capsule 0 4 mg, 0 4 mg, Oral, Daily With Dinner, Calloway Anastasia, DO    Current Outpatient Medications:     amLODIPine (NORVASC) 5 mg tablet, TAKE ONE TABLET BY MOUTH EVERY EVENING, Disp: 90 tablet, Rfl: 3    aspirin 81 MG tablet, Take 1 tablet by mouth daily, Disp: , Rfl:     atorvastatin (LIPITOR) 20 mg tablet, TAKE ONE TABLET BY MOUTH ONCE EVERY DAY, Disp: , Rfl:     metoprolol tartrate (LOPRESSOR) 25 mg tablet, Take 1 tablet (25 mg total) by mouth every 12 (twelve) hours, Disp: 180 tablet, Rfl: 3    Multiple Vitamin (multivitamin) tablet, Take 1 tablet by mouth daily, Disp: 90 tablet, Rfl: 3    Omega-3 Fatty Acids (FISH OIL) 1,000 mg, Take by mouth daily, Disp: , Rfl:     omeprazole (PriLOSEC) 40 MG capsule, TAKE ONE CAPSULE BY MOUTH ONCE EVERY DAY, Disp: 90 capsule, Rfl: 3    senna-docusate sodium (SENOKOT S) 8 6-50 mg per tablet, Take 1 tablet by mouth daily at bedtime, Disp: 30 tablet, Rfl: 0    sertraline (ZOLOFT) 50 mg tablet, TAKE ONE TABLET BY MOUTH EVERY DAY, Disp: 90 tablet, Rfl: 3    tamsulosin (FLOMAX) 0 4 mg, Take 1 capsule (0 4 mg total) by mouth daily with dinner CORRECTED SCRIPT! Note change in SIG and QUANTITY  Please process accordingly    Thank you! (9/1/20), Disp: 90 capsule, Rfl: 1    Allergies: Allergies   Allergen Reactions    Codeine Other (See Comments)     agitated    Sulfamethoxazole-Trimethoprim GI Intolerance and Abdominal Pain     upset stomach     History:  Marital Status: /Civil Union     Substance Use History:   Social History     Substance and Sexual Activity   Alcohol Use Yes    Alcohol/week: 10 0 standard drinks    Types: 10 Cans of beer per week    Frequency: 4 or more times a week    Drinks per session: 3 or 4     Social History     Tobacco Use   Smoking Status Former Smoker    Packs/day: 1 00    Years: 15 00    Pack years: 15     Types: Cigarettes    Start date:     Quit date: 1980    Years since quittin 6   Smokeless Tobacco Never Used     Social History     Substance and Sexual Activity   Drug Use Not Currently    Types: Marijuana       Family History:  Family History   Problem Relation Age of Onset    No Known Problems Mother     Heart attack Father     Diabetes Brother     Prostate cancer Brother        Physical Exam:     Vitals:   Blood Pressure: (!) 146/108 (21)  Pulse: 74 (21)  Temperature: 97 7 °F (36 5 °C) (21)  Temp Source: Oral (21)  Respirations: 22 (21)  Weight - Scale: 84 8 kg (187 lb) (21)  SpO2: 97 % (21)    Constitutional:  Well developed, well nourished, no acute distress, non-toxic appearance   Eyes:  PERRL, conjunctiva normal   HENT:  Atraumatic, external ears normal, nose normal, oropharynx moist, no pharyngeal exudates  Neck- normal range of motion, no tenderness, supple   Respiratory:  No respiratory distress, normal breath sounds, no rales, no wheezing   Cardiovascular:  Normal rate, normal rhythm, no murmurs, no gallops, no rubs   GI:  Soft, nondistended, normal bowel sounds, nontender, no organomegaly, no mass, no rebound, no guarding   :  No costovertebral angle tenderness   Musculoskeletal:  No edema, no tenderness, no deformities   Back- no tenderness  Integument:  Well hydrated, no rash, pallor noted   Lymphatic:  No lymphadenopathy noted   Neurologic:  Alert &awake, communicative, CN 2-12 normal, normal motor function, normal sensory function, no focal deficits noted   Psychiatric:  Speech and behavior appropriate       Lab Results: I have personally reviewed pertinent reports  Results from last 7 days   Lab Units 04/09/21  0045   WBC Thousand/uL 89 58*   HEMOGLOBIN g/dL 4 6*   HEMATOCRIT % 14 9*   PLATELETS Thousands/uL 181   NEUTROS PCT % 6*   LYMPHS PCT % 90*   MONOS PCT % 4   EOS PCT % 0     Results from last 7 days   Lab Units 04/09/21  0045   POTASSIUM mmol/L 4 2   CHLORIDE mmol/L 95*   CO2 mmol/L 24   BUN mg/dL 10   CREATININE mg/dL 0 77   CALCIUM mg/dL 8 1*   ALK PHOS U/L 88   ALT U/L 18   AST U/L 21           EKG:  Sinus rhythm with occasional PACs, persistent nonspecific ST-T changes    Imaging: I have personally reviewed pertinent films in PACS    CXR:  Personally reviewed, some increased opacity particularly in lingula  Final radiologist read is pending  ** Please Note: Dragon 360 Dictation voice to text software was used in the creation of this document   **

## 2021-04-09 NOTE — QUICK NOTE
CT abdomen/pelvis ordered per Dr Kasandra Crowe request given findings of abdominal distension on exam

## 2021-04-09 NOTE — ASSESSMENT & PLAN NOTE
· Sodium 128 on admission, did have prior chronic hyponatremia 131-134 noted however some recent normal values  · Na 134 on repeat labs   · monitor with BMP

## 2021-04-09 NOTE — ASSESSMENT & PLAN NOTE
· Follows with Dr Silke Jaeger, echo 01/2021 EF 60% with peak pressure 75 mmHg  · May need IV diuresis if shortness of breath develops during blood transfusion, close monitoring

## 2021-04-09 NOTE — ASSESSMENT & PLAN NOTE
· Developing over past 1 week, history of pulmonary hypertension noted however suspect this is more likely in setting of profound anemia and will assess response to PRBC transfusions  · Troponin negative   · Monitor on telemetry   · He is on room air at home

## 2021-04-09 NOTE — PLAN OF CARE
Problem: Potential for Falls  Goal: Patient will remain free of falls  Description: INTERVENTIONS:  - Assess patient frequently for physical needs  -  Identify cognitive and physical deficits and behaviors that affect risk of falls    -  [place] fall precautions as indicated by assessment   - Educate patient/family on patient safety including physical limitations  - Instruct patient to call for assistance with activity based on assessment  - Modify environment to reduce risk of injury  - Consider OT/PT consult to assist with strengthening/mobility  Outcome: Progressing     Problem: PAIN - ADULT  Goal: Verbalizes/displays adequate comfort level or baseline comfort level  Description: Interventions:  - Encourage patient to monitor pain and request assistance  - Assess pain using appropriate pain scale  - Administer analgesics based on type and severity of pain and evaluate response  - Implement non-pharmacological measures as appropriate and evaluate response  - Consider cultural and social influences on pain and pain management  - Notify physician/advanced practitioner if interventions unsuccessful or patient reports new pain  Outcome: Progressing     Problem: INFECTION - ADULT  Goal: Absence or prevention of progression during hospitalization  Description: INTERVENTIONS:  - Assess and monitor for signs and symptoms of infection  - Monitor lab/diagnostic results  - Monitor all insertion sites, i e  indwelling lines, tubes, and drains  - Monitor endotracheal if appropriate and nasal secretions for changes in amount and color  - [place] appropriate cooling/warming therapies per order  - Administer medications as ordered  - Instruct and encourage patient and family to use good hand hygiene technique  - Identify and instruct in appropriate isolation precautions for identified infection/condition  Outcome: Progressing  Goal: Absence of fever/infection during neutropenic period  Description: INTERVENTIONS:  - Monitor WBC    Outcome: Progressing     Problem: SAFETY ADULT  Goal: Patient will remain free of falls  Description: INTERVENTIONS:  - Assess patient frequently for physical needs  -  Identify cognitive and physical deficits and behaviors that affect risk of falls    -  [place] fall precautions as indicated by assessment   - Educate patient/family on patient safety including physical limitations  - Instruct patient to call for assistance with activity based on assessment  - Modify environment to reduce risk of injury  - Consider OT/PT consult to assist with strengthening/mobility  Outcome: Progressing  Goal: Maintain or return to baseline ADL function  Description: INTERVENTIONS:  -  Assess patient's ability to carry out ADLs; assess patient's baseline for ADL function and identify physical deficits which impact ability to perform ADLs (bathing, care of mouth/teeth, toileting, grooming, dressing, etc )  - Assess/evaluate cause of self-care deficits   - Assess range of motion  - Assess patient's mobility; develop plan if impaired  - Assess patient's need for assistive devices and provide as appropriate  - Encourage maximum independence but intervene and supervise when necessary  - Involve family in performance of ADLs  - Assess for home care needs following discharge   - Consider OT consult to assist with ADL evaluation and planning for discharge  - Provide patient education as appropriate  Outcome: Progressing  Goal: Maintain or return mobility status to optimal level  Description: INTERVENTIONS:  - Assess patient's baseline mobility status (ambulation, transfers, stairs, etc )    - Identify cognitive and physical deficits and behaviors that affect mobility  - Identify mobility aids required to assist with transfers and/or ambulation (gait belt, sit-to-stand, lift, walker, cane, etc )  - [place] fall precautions as indicated by assessment  - Record patient progress and toleration of activity level on Mobility SBAR; progress patient to next Phase/Stage  - Instruct patient to call for assistance with activity based on assessment  - Consider rehabilitation consult to assist with strengthening/weightbearing, etc   Outcome: Progressing     Problem: DISCHARGE PLANNING  Goal: Discharge to home or other facility with appropriate resources  Description: INTERVENTIONS:  - Identify barriers to discharge w/patient and caregiver  - Arrange for needed discharge resources and transportation as appropriate  - Identify discharge learning needs (meds, wound care, etc )  - Arrange for interpretive services to assist at discharge as needed  - Refer to Case Management Department for coordinating discharge planning if the patient needs post-hospital services based on physician/advanced practitioner order or complex needs related to functional status, cognitive ability, or social support system  Outcome: Progressing     Problem: Knowledge Deficit  Goal: Patient/family/caregiver demonstrates understanding of disease process, treatment plan, medications, and discharge instructions  Description: Complete learning assessment and assess knowledge base    Interventions:  - Provide teaching at level of understanding  - Provide teaching via preferred learning methods  Outcome: Progressing

## 2021-04-09 NOTE — PROGRESS NOTES
[facility]  Progress Note Anneliese Meehan 1950, 78 y o  male MRN: [id number]  Unit/Bed#: ED 20 Encounter: [id number]  Primary Care Provider: Aaron Wallace MD   Date and time admitted to hospital: 4/9/2021 12:14 AM    * Acute on chronic anemia  Assessment & Plan  · POA with SOB found of have hgb 4 6 on admission   · Known CLL diagnosed in March 2010 under care of Dr Nu Williamson   · additionally with history of gastric vascular ectasia however denies any visible bleeding  · GI consulted going for EGD today   · Receiving first unit out of 3 PRBC ordered now   · Repeat H&H after transfusion   · Monitor volume status   · Check iron panel  · Check STAT peripheral smear, LDH, haptoglobin   · Consult Hematology   · Hold [facility] for now     CLL (chronic lymphocytic leukemia) ([facility] [address] )  Assessment & Plan  · Follows with Dr Nu Williamson, not on any treatment   · Consult Hematology for concern for hemolysis     Pulmonary hypertension ([facility] )  Assessment & Plan  · Follows with Dr Lorena Arriaga, echo 01/2021 EF 60% with peak pressure 75 mmHg  · May need IV diuresis if shortness of breath develops during blood transfusion, close monitoring     Esophageal reflux  Assessment & Plan  · Continue IV PPI for now    Hyponatremia  Assessment & Plan  · Sodium 128 on admission, did have prior chronic hyponatremia 131-134 noted however some recent normal values  · Na 134 on repeat labs   · monitor with BMP    Dyspnea on exertion  Assessment & Plan  · Developing over past 1 week, history of pulmonary hypertension noted however suspect this is more likely in setting of profound anemia and will assess response to PRBC transfusions  · Troponin negative   · Monitor on telemetry   · He is on room air at home     Essential hypertension  Assessment & Plan  hold norvasc for now with profound anemia   Continue BB as BP tolerates     Mild depression (HCC)  Assessment & Plan  Continue zoloft       Post admin check     VTE Pharmacologic Prophylaxis:   Pharmacologic: Pharmacologic VTE Prophylaxis contraindicated due to profound anemia   Mechanical VTE Prophylaxis in Place: Yes    Patient Centered Rounds: I have performed bedside rounds with nursing staff today  Discussions with Specialists or Other Care Team Provider: Oncology     Education and Discussions with Family / Patient: patient at the bedside,called his wife and updated on plan of care    Time Spent for Care: 20 minutes  More than 50% of total time spent on counseling and coordination of care as described above  Current Length of Stay: 0 day(s)    Current Patient Status: Inpatient   Certification Statement: The patient will continue to require additional inpatient hospital stay due to CLL, anemia     Discharge Plan: pending clinical course     Code Status: Level 1 - Full Code      Subjective:   Jono Land is lying in bed flat comfortably  He is on 4 lpm nasal cannula currently speaking in full sentences  He denies pain  States he feels better than when he came in  No chest pain or SOB at this time  No abdominal pain  No N/V  No rashes  No bleeding from anywhere noted  He is glad Dr Machado is here  Objective:     Vitals:   Temp (24hrs), Av 3 °F (36 8 °C), Min:97 7 °F (36 5 °C), Max:98 9 °F (37 2 °C)    Temp:  [97 7 °F (36 5 °C)-98 9 °F (37 2 °C)] 98 9 °F (37 2 °C)  HR:  [64-76] 70  Resp:  [16-24] 19  BP: (116-146)/() 116/60  SpO2:  [94 %-100 %] 100 %  Body mass index is 28 43 kg/m²  Input and Output Summary (last 24 hours):     No intake or output data in the 24 hours ending 21 0824    Physical Exam:     Physical Exam  Vitals signs and nursing note reviewed  Constitutional:       General: He is not in acute distress  Appearance: He is ill-appearing  HENT:      Head: Normocephalic  Eyes:      Comments: Pale conjunctiva    Cardiovascular:      Rate and Rhythm: Normal rate and regular rhythm     Pulmonary:      Effort: Pulmonary effort is normal  No respiratory distress  Breath sounds: No wheezing, rhonchi or rales  Comments: Speaking in full sentences, on 4 lpm nasal cannula   Abdominal:      General: Bowel sounds are normal       Palpations: Abdomen is soft  Comments: Large, round abdominal circumference, non tender    Musculoskeletal:      Right lower leg: No edema  Left lower leg: No edema  Skin:     Coloration: Skin is pale  Findings: No rash  Neurological:      Mental Status: He is alert  Mental status is at baseline  Psychiatric:         Mood and Affect: Mood normal          Additional Data:     Labs:    Results from last 7 days   Lab Units 04/09/21  0632 04/09/21  0045   WBC Thousand/uL 77 59* 89 58*   HEMOGLOBIN g/dL 4 6* 4 6*   HEMATOCRIT % 14 9* 14 9*   PLATELETS Thousands/uL 170 181   NEUTROS PCT %  --  6*   LYMPHS PCT %  --  90*   MONOS PCT %  --  4   EOS PCT %  --  0     Results from last 7 days   Lab Units 04/09/21  0632 04/09/21  0045   SODIUM mmol/L 134* 128*   POTASSIUM mmol/L 4 2 4 2   CHLORIDE mmol/L 100 95*   CO2 mmol/L 26 24   BUN mg/dL 10 10   CREATININE mg/dL 0 76 0 77   ANION GAP mmol/L 8 9   CALCIUM mg/dL 8 2* 8 1*   ALBUMIN g/dL  --  3 7   TOTAL BILIRUBIN mg/dL  --  1 97*   ALK PHOS U/L  --  88   ALT U/L  --  18   AST U/L  --  21   GLUCOSE RANDOM mg/dL 119 127                           * I Have Reviewed All Lab Data Listed Above  * Additional Pertinent Lab Tests Reviewed:  All Labs Within Last 24 Hours Reviewed     Imaging:    Imaging Reports Reviewed Today Include:   Imaging Personally Reviewed by Myself Includes:      Recent Cultures (last 7 days):           Last 24 Hours Medication List:   Current Facility-Administered Medications   Medication Dose Route Frequency Provider Last Rate    atorvastatin  20 mg Oral Daily With Dinner Love Spoon, DO      fish oil  1,000 mg Oral Daily Love Spoon, DO      metoprolol tartrate  25 mg Oral Q12H [facility]      multivitamin-minerals  1 tablet Oral Daily Sabrina Olney, DO      ondansetron  4 mg Intravenous Q6H PRN Sabrina Olney, DO      pantoprazole  40 mg Intravenous Q12H [address] Andrzej Bradley MD      senna-docusate sodium  1 tablet Oral HS Sabrina Olney, DO      sertraline  50 mg Oral Daily Sabrina Olney, DO      tamsulosin  0 4 mg Oral Daily With Jelly Farias DO          Today, Patient Was Seen By: Narinder Rdz PA-C    ** Please Note: Dictation voice to text software may have been used in the creation of this document   **

## 2021-04-09 NOTE — ED NOTES
Spoke with blood bank, karla states that they can send up the 2nd unit in about 5 minutes as more testing is underway according to the supervisor       Jennifer Cha RN  04/09/21 8301

## 2021-04-09 NOTE — ED NOTES
Received 1st unit of blood at this time  Upon inspection of bag, blood bag seemed to have a hole in it and leaking at this time  Blood bank made aware of damage to specimen        Stu Salcido RN  04/09/21 [id number]

## 2021-04-09 NOTE — CONSULTS
Consult Service: Gastroenterology      PATIENT INFORMATION      Ashish Slater 78 y o  male MRN: [id number]  Unit/Bed#: ED 20 Encounter: [id number]  PCP: Nevin Rocha MD  Date of Admission:  4/9/2021  Date of Consultation: 04/09/21  Requesting Physician: Vernell Pennington*       ASSESSMENTS & PLAN   Ashish Slater is a 78 y o  male with past medical history of CLL, gave, GERD presented with shortness of breath 2/2 acute symptomatic anemia    Symptomatic acute anemia - no overt bleeding  This may be a consequence of his CLL given his marked elevation as white count  He also has an elevated T bili so there component of hemolysis  However given the significant drop in his hemoglobin from less than 1 month ago from 10 4-4 6, will perform endoscopic evaluation and rule out PUD, AVMs, gastritis, esophagitis and/or malignancy  · Keep NPO  Plan for EGD in afternoon after appropriate resuscitation  · IV PPI BID  · Recommend Initiating hemolysis workup and hematology consult        REASON FOR CONSULTATION      Acute anemia      HISTORY OF PRESENT ILLNESS      Ashish Slater is a 78 y o  male with past medical history of CLL, gave, GERD presented with shortness of breath  Patient states for the past week he has been having exertional shortness of breath associated lightheadedness dizziness fatigue  He denies abdominal pain nausea vomiting constipation or diarrhea  He does not use NSAIDs  Last EGD on 01/2020 showed 3 large ulcerated gastric polyps and gave status post APC  Last colonoscopy on 10/2018 showed 3 subcentimeter colon polyps and pandiverticulosis  No family history of colon cancer or IBD  No significant alcohol smoking history  When he arrived to the ED he was noted to be short of breath and hemoglobin dropped from 10 4-4 6 and less than a month  He has ordered 3 units of PRBCs  He also noted to have elevated T bili and marked leukocytosis        REVIEW OF SYSTEMS     A thorough 12-point review of systems has been conducted  Pertinent positives and negatives are mentioned in the history of present illness  PAST MEDICAL & SURGICAL HISTORY      Past Medical History:   Diagnosis Date    Anxiety     BPH (benign prostatic hypertrophy)     Cancer (HCC)     CLL    CLL (chronic lymphocytic leukemia) ([facility] [address] )     2009    Colon polyp     Concussion     Resolved: 08/22/16    Depression     Gastrointestinal hemorrhage     Last assessed: 08/27/13    GERD (gastroesophageal reflux disease)     Hearing loss of aging     Hiatal hernia     History of transfusion     Hyperlipidemia     Hypertension     Iron deficiency anemia     Microscopic hematuria     Last assessed: 06/28/13    OA (osteoarthritis)     right hip    Pneumothorax     Prostatitis     Pulmonary hypertension (HCC)     Seasonal allergies     UTI (urinary tract infection)     in past    Wears glasses        Past Surgical History:   Procedure Laterality Date    COLONOSCOPY      CYSTOSCOPY  10/09/2014    Diagnostic    CYSTOSCOPY  06/29/2020    FRACTURE SURGERY      left lower arm    FRACTURE SURGERY      left femur    HERNIA REPAIR      JOINT REPLACEMENT Right     hip    OTHER SURGICAL HISTORY  03/2011    Spinal anesthesia epidural    SD TOTAL HIP ARTHROPLASTY Right 9/29/2017    Procedure: ARTHROPLASTY HIP TOTAL ANTERIOR;  Surgeon: Shaina Moreno MD;  Location: [facility];  Service: Orthopedics    TONSILLECTOMY AND ADENOIDECTOMY      TRANSURETHRAL RESECTION OF PROSTATE      x 2    WRIST SURGERY           MEDICATIONS & ALLERGIES       Medications:   Prior to Admission medications    Medication Sig Start Date End Date Taking?  Authorizing Provider   amLODIPine (NORVASC) 5 mg tablet TAKE ONE TABLET BY MOUTH EVERY EVENING 8/3/20   Alvin Argueta MD   aspirin 81 MG tablet Take 1 tablet by mouth daily    Historical Provider, MD   atorvastatin (LIPITOR) 20 mg tablet TAKE ONE TABLET BY MOUTH ONCE EVERY DAY 2/17/21 Historical Provider, MD   metoprolol tartrate (LOPRESSOR) 25 mg tablet Take 1 tablet (25 mg total) by mouth every 12 (twelve) hours 3/17/21   Jose Eduardo Beck MD   Multiple Vitamin (multivitamin) tablet Take 1 tablet by mouth daily 21   Jose Eduardo Beck MD   Omega-3 Fatty Acids (FISH OIL) 1,000 mg Take by mouth daily    Historical Provider, MD   omeprazole (PriLOSEC) 40 MG capsule TAKE ONE CAPSULE BY MOUTH ONCE EVERY DAY 21   Jose Eduardo Beck MD   senna-docusate sodium (SENOKOT S) 8 6-50 mg per tablet Take 1 tablet by mouth daily at bedtime 21   Lyle Nick MD   sertraline (ZOLOFT) 50 mg tablet TAKE ONE TABLET BY MOUTH EVERY DAY 20   Jose Eduardo Beck MD   [facility]) 0 4 mg Take 1 capsule (0 4 mg total) by mouth daily with dinner CORRECTED SCRIPT! Note change in SIG and QUANTITY  Please process accordingly  Thank you! (20) 21   Jose Eduardo Beck MD       Allergies:    Allergies   Allergen Reactions    Codeine Other (See Comments)     agitated    Sulfamethoxazole-Trimethoprim GI Intolerance and Abdominal Pain     upset stomach         SOCIAL HISTORY      Marital Status: /Civil Union    Substance Use History:   Social History     Substance and Sexual Activity   Alcohol Use Yes    Alcohol/week: 10 0 standard drinks    Types: 10 Cans of beer per week    Frequency: 4 or more times a week    Drinks per session: 3 or 4     Social History     Tobacco Use   Smoking Status Former Smoker    Packs/day: 1 00    Years: 15 00    Pack years: 15 00    Types: Cigarettes    Start date:     Quit date: 1980    Years since quittin 6   Smokeless Tobacco Never Used     Social History     Substance and Sexual Activity   Drug Use Not Currently    Types: Marijuana         FAMILY HISTORY      Non-Contributory      PHYSICAL EXAM     Vitals:   Blood Pressure: 139/59 (21 0600)  Pulse: 72 (21 0600)  Temperature: 97 7 °F (36 5 °C) (04/09/21 0019)  Temp Source: Oral (04/09/21 0019)  Respirations: (!) 24 (04/09/21 0600)  Weight - Scale: 84 8 kg (187 lb) (04/09/21 0019)  SpO2: 94 % (04/09/21 0600)    Physical Exam:   GENERAL: NAD  HEENT:  NC/AT, no scleral icterus  CARDIAC:  RRR, +S1/S2  PULMONARY:  CTA B/L, no wheezing/rales/rhonci, non-labored breathing  ABDOMEN:  Soft, NT/ND, +BS, no rebound/guarding/rigidity  Extremities:  No edema, cyanosis, or clubbing  NEUROLOGIC:  Alert  SKIN:  No rashes or erythema      ADDITIONAL DATA     Lab Results:     Results from last 7 days   Lab Units 04/09/21  0045   WBC Thousand/uL 89 58*   HEMOGLOBIN g/dL 4 6*   HEMATOCRIT % 14 9*   PLATELETS Thousands/uL 181   NEUTROS PCT % 6*   LYMPHS PCT % 90*   MONOS PCT % 4   EOS PCT % 0     Results from last 7 days   Lab Units 04/09/21  0045   POTASSIUM mmol/L 4 2   CHLORIDE mmol/L 95*   CO2 mmol/L 24   BUN mg/dL 10   CREATININE mg/dL 0 77   CALCIUM mg/dL 8 1*   ALK PHOS U/L 88   ALT U/L 18   AST U/L 21           Imaging:    No results found  EKG, Pathology, and Other Studies Reviewed on Admission:   · EKG: Reviewed      Counseling / Coordination of Care Time: 30 total mins spent n consult  Greater than 50% of total time spent on patient counseling and coordination of care     ** Please Note: This note is constructed using a voice recognition dictation system   **

## 2021-04-09 NOTE — ASSESSMENT & PLAN NOTE
· Developing over past 1 week, history of pulmonary hypertension noted however suspect this is more likely in setting of profound anemia and will assess response to PRBC transfusions  · Additionally will trend troponin and monitor on telemetry for now

## 2021-04-09 NOTE — ED NOTES
Requesting 2nd unit of blood  Blood bank asked for another hour for them to recheck antibodies  Xavier Vanessa in department and she is ok with delay for safety of blood   Will continue to monitor     Mario Crawford RN  04/09/21 3608

## 2021-04-10 LAB
ABO GROUP BLD BPU: NORMAL
ALBUMIN SERPL BCP-MCNC: 3.7 G/DL (ref 3.5–5)
ALP SERPL-CCNC: 90 U/L (ref 46–116)
ALT SERPL W P-5'-P-CCNC: 16 U/L (ref 12–78)
ANION GAP SERPL CALCULATED.3IONS-SCNC: 8 MMOL/L (ref 4–13)
AST SERPL W P-5'-P-CCNC: 20 U/L (ref 5–45)
BILIRUB SERPL-MCNC: 2.53 MG/DL (ref 0.2–1)
BPU ID: NORMAL
BUN SERPL-MCNC: 12 MG/DL (ref 5–25)
CALCIUM SERPL-MCNC: 8.4 MG/DL (ref 8.3–10.1)
CHLORIDE SERPL-SCNC: 100 MMOL/L (ref 100–108)
CO2 SERPL-SCNC: 25 MMOL/L (ref 21–32)
CREAT SERPL-MCNC: 0.77 MG/DL (ref 0.6–1.3)
CROSSMATCH: NORMAL
DAT C3-SP REAG RBC QL: NEGATIVE
DAT IGG-SP REAG RBCCO QL: NORMAL
DAT POLY-SP REAG RBC QL: NORMAL
ERYTHROCYTE [DISTWIDTH] IN BLOOD BY AUTOMATED COUNT: 33.5 % (ref 11.6–15.1)
GFR SERPL CREATININE-BSD FRML MDRD: 93 ML/MIN/1.73SQ M
GLUCOSE SERPL-MCNC: 104 MG/DL (ref 65–140)
HAPTOGLOB SERPL-MCNC: <10 MG/DL (ref 32–363)
HCT VFR BLD AUTO: 23 % (ref 36.5–49.3)
HGB BLD-MCNC: 7.2 G/DL (ref 12–17)
MCH RBC QN AUTO: 35.1 PG (ref 26.8–34.3)
MCHC RBC AUTO-ENTMCNC: 31.3 G/DL (ref 31.4–37.4)
MCV RBC AUTO: 112 FL (ref 82–98)
NRBC BLD AUTO-RTO: 0 /100 WBCS
PLATELET # BLD AUTO: 168 THOUSANDS/UL (ref 149–390)
PMV BLD AUTO: 10.1 FL (ref 8.9–12.7)
POTASSIUM SERPL-SCNC: 4.2 MMOL/L (ref 3.5–5.3)
PROT SERPL-MCNC: 7.7 G/DL (ref 6.4–8.2)
RBC # BLD AUTO: 2.05 MILLION/UL (ref 3.88–5.62)
RETICS # AUTO: ABNORMAL 10*3/UL (ref 14356–105094)
RETICS # AUTO: ABNORMAL 10*3/UL (ref 14356–105094)
RETICS # CALC: 13.78 % (ref 0.37–1.87)
RETICS # CALC: 15.93 % (ref 0.37–1.87)
SODIUM SERPL-SCNC: 133 MMOL/L (ref 136–145)
UNIT DISPENSE STATUS: NORMAL
UNIT PRODUCT CODE: NORMAL
UNIT RH: NORMAL
WBC # BLD AUTO: 65.29 THOUSAND/UL (ref 4.31–10.16)

## 2021-04-10 PROCEDURE — C9113 INJ PANTOPRAZOLE SODIUM, VIA: HCPCS | Performed by: INTERNAL MEDICINE

## 2021-04-10 PROCEDURE — 85045 AUTOMATED RETICULOCYTE COUNT: CPT | Performed by: INTERNAL MEDICINE

## 2021-04-10 PROCEDURE — 82595 ASSAY OF CRYOGLOBULIN: CPT | Performed by: INTERNAL MEDICINE

## 2021-04-10 PROCEDURE — 86157 COLD AGGLUTININ TITER: CPT | Performed by: INTERNAL MEDICINE

## 2021-04-10 PROCEDURE — 80053 COMPREHEN METABOLIC PANEL: CPT | Performed by: PHYSICIAN ASSISTANT

## 2021-04-10 PROCEDURE — 85027 COMPLETE CBC AUTOMATED: CPT | Performed by: PHYSICIAN ASSISTANT

## 2021-04-10 PROCEDURE — 86860 RBC ANTIBODY ELUTION: CPT | Performed by: INTERNAL MEDICINE

## 2021-04-10 PROCEDURE — 86880 COOMBS TEST DIRECT: CPT | Performed by: INTERNAL MEDICINE

## 2021-04-10 PROCEDURE — 99232 SBSQ HOSP IP/OBS MODERATE 35: CPT | Performed by: INTERNAL MEDICINE

## 2021-04-10 RX ORDER — LANOLIN ALCOHOL/MO/W.PET/CERES
3 CREAM (GRAM) TOPICAL
Status: DISCONTINUED | OUTPATIENT
Start: 2021-04-10 | End: 2021-04-12 | Stop reason: HOSPADM

## 2021-04-10 RX ORDER — PREDNISONE 20 MG/1
60 TABLET ORAL DAILY
Status: DISCONTINUED | OUTPATIENT
Start: 2021-04-10 | End: 2021-04-12 | Stop reason: HOSPADM

## 2021-04-10 RX ORDER — ACETAMINOPHEN 325 MG/1
975 TABLET ORAL EVERY 6 HOURS PRN
Status: DISCONTINUED | OUTPATIENT
Start: 2021-04-10 | End: 2021-04-12 | Stop reason: HOSPADM

## 2021-04-10 RX ORDER — PANTOPRAZOLE SODIUM 40 MG/1
40 TABLET, DELAYED RELEASE ORAL EVERY 12 HOURS SCHEDULED
Status: DISCONTINUED | OUTPATIENT
Start: 2021-04-10 | End: 2021-04-12 | Stop reason: HOSPADM

## 2021-04-10 RX ADMIN — PANTOPRAZOLE SODIUM 40 MG: 40 INJECTION, POWDER, FOR SOLUTION INTRAVENOUS at 07:51

## 2021-04-10 RX ADMIN — OMEGA-3 FATTY ACIDS CAP 1000 MG 1000 MG: 1000 CAP at 07:53

## 2021-04-10 RX ADMIN — PREDNISONE 60 MG: 20 TABLET ORAL at 15:25

## 2021-04-10 RX ADMIN — Medication 1 TABLET: at 07:52

## 2021-04-10 RX ADMIN — METOPROLOL TARTRATE 25 MG: 25 TABLET, FILM COATED ORAL at 20:00

## 2021-04-10 RX ADMIN — ATORVASTATIN CALCIUM 20 MG: 20 TABLET, FILM COATED ORAL at 17:24

## 2021-04-10 RX ADMIN — SENNOSIDES AND DOCUSATE SODIUM 1 TABLET: 8.6; 5 TABLET ORAL at 21:43

## 2021-04-10 RX ADMIN — SERTRALINE HYDROCHLORIDE 50 MG: 50 TABLET ORAL at 07:52

## 2021-04-10 RX ADMIN — MELATONIN TAB 3 MG 3 MG: 3 TAB at 21:42

## 2021-04-10 RX ADMIN — METOPROLOL TARTRATE 25 MG: 25 TABLET, FILM COATED ORAL at 07:52

## 2021-04-10 RX ADMIN — TAMSULOSIN HYDROCHLORIDE 0.4 MG: 0.4 CAPSULE ORAL at 17:23

## 2021-04-10 RX ADMIN — PANTOPRAZOLE SODIUM 40 MG: 40 TABLET, DELAYED RELEASE ORAL at 21:43

## 2021-04-10 NOTE — ASSESSMENT & PLAN NOTE
· Follows with Dr Nathaniel Ryder, not on any treatment   · Likely hemolytic anemia consistent with diagnosis of CLL; followed by Hematology/Oncology

## 2021-04-10 NOTE — PLAN OF CARE
Problem: Potential for Falls  Goal: Patient will remain free of falls  Description: INTERVENTIONS:  - Assess patient frequently for physical needs  -  Identify cognitive and physical deficits and behaviors that affect risk of falls    -  [place] fall precautions as indicated by assessment   - Educate patient/family on patient safety including physical limitations  - Instruct patient to call for assistance with activity based on assessment  - Modify environment to reduce risk of injury  - Consider OT/PT consult to assist with strengthening/mobility  Outcome: Progressing     Problem: PAIN - ADULT  Goal: Verbalizes/displays adequate comfort level or baseline comfort level  Description: Interventions:  - Encourage patient to monitor pain and request assistance  - Assess pain using appropriate pain scale  - Administer analgesics based on type and severity of pain and evaluate response  - Implement non-pharmacological measures as appropriate and evaluate response  - Consider cultural and social influences on pain and pain management  - Notify physician/advanced practitioner if interventions unsuccessful or patient reports new pain  Outcome: Progressing     Problem: INFECTION - ADULT  Goal: Absence or prevention of progression during hospitalization  Description: INTERVENTIONS:  - Assess and monitor for signs and symptoms of infection  - Monitor lab/diagnostic results  - Monitor all insertion sites, i e  indwelling lines, tubes, and drains  - Monitor endotracheal if appropriate and nasal secretions for changes in amount and color  - [place] appropriate cooling/warming therapies per order  - Administer medications as ordered  - Instruct and encourage patient and family to use good hand hygiene technique  - Identify and instruct in appropriate isolation precautions for identified infection/condition  Outcome: Progressing  Goal: Absence of fever/infection during neutropenic period  Description: INTERVENTIONS:  - Monitor WBC    Outcome: Progressing     Problem: SAFETY ADULT  Goal: Patient will remain free of falls  Description: INTERVENTIONS:  - Assess patient frequently for physical needs  -  Identify cognitive and physical deficits and behaviors that affect risk of falls    -  [place] fall precautions as indicated by assessment   - Educate patient/family on patient safety including physical limitations  - Instruct patient to call for assistance with activity based on assessment  - Modify environment to reduce risk of injury  - Consider OT/PT consult to assist with strengthening/mobility  Outcome: Progressing  Goal: Maintain or return to baseline ADL function  Description: INTERVENTIONS:  -  Assess patient's ability to carry out ADLs; assess patient's baseline for ADL function and identify physical deficits which impact ability to perform ADLs (bathing, care of mouth/teeth, toileting, grooming, dressing, etc )  - Assess/evaluate cause of self-care deficits   - Assess range of motion  - Assess patient's mobility; develop plan if impaired  - Assess patient's need for assistive devices and provide as appropriate  - Encourage maximum independence but intervene and supervise when necessary  - Involve family in performance of ADLs  - Assess for home care needs following discharge   - Consider OT consult to assist with ADL evaluation and planning for discharge  - Provide patient education as appropriate  Outcome: Progressing  Goal: Maintain or return mobility status to optimal level  Description: INTERVENTIONS:  - Assess patient's baseline mobility status (ambulation, transfers, stairs, etc )    - Identify cognitive and physical deficits and behaviors that affect mobility  - Identify mobility aids required to assist with transfers and/or ambulation (gait belt, sit-to-stand, lift, walker, cane, etc )  - [place] fall precautions as indicated by assessment  - Record patient progress and toleration of activity level on Mobility SBAR; progress patient to next Phase/Stage  - Instruct patient to call for assistance with activity based on assessment  - Consider rehabilitation consult to assist with strengthening/weightbearing, etc   Outcome: Progressing     Problem: DISCHARGE PLANNING  Goal: Discharge to home or other facility with appropriate resources  Description: INTERVENTIONS:  - Identify barriers to discharge w/patient and caregiver  - Arrange for needed discharge resources and transportation as appropriate  - Identify discharge learning needs (meds, wound care, etc )  - Arrange for interpretive services to assist at discharge as needed  - Refer to Case Management Department for coordinating discharge planning if the patient needs post-hospital services based on physician/advanced practitioner order or complex needs related to functional status, cognitive ability, or social support system  Outcome: Progressing     Problem: Knowledge Deficit  Goal: Patient/family/caregiver demonstrates understanding of disease process, treatment plan, medications, and discharge instructions  Description: Complete learning assessment and assess knowledge base    Interventions:  - Provide teaching at level of understanding  - Provide teaching via preferred learning methods  Outcome: Progressing

## 2021-04-10 NOTE — NURSING NOTE
Lab person called and stated the cryoglobulin needs to be redrawn as it requires a block so the vial is warm prior to drawing the specimen  HUGO Freitas told about this and that she is required to go to the lab to obtain the heating block needed  The lab person will reenter the lab so it can be drawn

## 2021-04-10 NOTE — ASSESSMENT & PLAN NOTE
· POA with SOB found of have hgb 4 6 on admission   · Known CLL diagnosed in March 2010 under care of Dr Sheree Montez   · additionally with history of gastric vascular ectasia however denies any visible bleeding  · GI consulted -underwent EGD and evidence of GA ve disease, however unlikely cause of anemia  GI signed off      · Transfused 4 units PRBCs since admission  · Repeat H&H -7 3  · Monitor volume status   · Appreciate hematology recommendations, following  · Direct Narciso test positive, reticulocytes 15 93%, haptoglobin less than 10--all consistent with diagnosis of hemolytic anemia  · As per Hematology recommendations, have started 60 mg of prednisone daily

## 2021-04-10 NOTE — ASSESSMENT & PLAN NOTE
· Developing over past 1 week, history of pulmonary hypertension noted however suspect this is more likely in setting of profound anemia and will assess response to PRBC transfusions  · Troponin negative , vital stable  · He is on room air at home

## 2021-04-10 NOTE — CASE MANAGEMENT
Cm spoke to pt via unit phone, introduced self, role and discharged process  Pt presented as alert during conversation  Pt reported lives with spouse on 1st floor aptmt, 4 steps to enter  Pt reported being indep with ADLS PTA  Pt ambulate with RW independently  Pt has hx of inpt rehab, [facility] services  Denies any inpt MH or substance abuse  Pt PCP is Ivis Briones and preferred pharmacy is [facility]  Pt retired and don't drive  Per pt, son to transport upon discharged  CM reviewed d/c planning process including the following: identifying help at home, patient preference for d/c planning needs, Discharge Lounge, [facility] Meds to Bed program, availability of treatment team to discuss questions or concerns patient and/or family may have regarding understanding medications and recognizing signs and symptoms once discharged  CM also encouraged patient to follow up with all recommended appointments after discharge  Patient advised of importance for patient and family to participate in managing patients medical well being

## 2021-04-10 NOTE — PROGRESS NOTES
[facility]  Progress Note Trinidad Esters 1950, 78 y o  male MRN: [id number]  Unit/Bed#: [facility] 928-01 Encounter: [id number]  Primary Care Provider: Mike Panchal MD   Date and time admitted to hospital: 4/9/2021 12:14 AM    Dyspnea on exertion  Assessment & Plan  · Developing over past 1 week, history of pulmonary hypertension noted however suspect this is more likely in setting of profound anemia and will assess response to PRBC transfusions  · Troponin negative , vital stable  · He is on room air at home     Hyponatremia  Assessment & Plan  · Sodium 128 on admission, did have prior chronic hyponatremia 131-134 noted however some recent normal values  · Na 134 on repeat labs   · monitor with BMP    Mild depression ([address] )  Assessment & Plan  Continue zoloft     Pulmonary hypertension ([facility] [address] )  Assessment & Plan  · Follows with Dr Noe Osorio, echo 01/2021 EF 60% with peak pressure 75 mmHg  · May need IV diuresis if shortness of breath develops during blood transfusion, close monitoring     Esophageal reflux  Assessment & Plan  · Continue IV PPI for now    CLL (chronic lymphocytic leukemia) ([address] )  Assessment & Plan  · Follows with Dr Arlene Dailey, not on any treatment   · Likely hemolytic anemia consistent with diagnosis of CLL; followed by Hematology/Oncology    Essential hypertension  Assessment & Plan  hold norvasc for now with profound anemia   Continue BB as BP tolerates     * Acute on chronic anemia  Assessment & Plan  · POA with SOB found of have hgb 4 6 on admission   · Known CLL diagnosed in March 2010 under care of Dr Arlene Dailey   · additionally with history of gastric vascular ectasia however denies any visible bleeding  · GI consulted -underwent EGD and evidence of GA ve disease, however unlikely cause of anemia  GI signed off      · Transfused 4 units PRBCs since admission  · Repeat H&H -7 3  · Monitor volume status   · Appreciate hematology recommendations, following  · Direct Alexandra test positive, reticulocytes 15 93%, haptoglobin less than 10--all consistent with diagnosis of hemolytic anemia  · As per Hematology recommendations, have started 60 mg of prednisone daily      VTE Pharmacologic Prophylaxis:   Pharmacologic: Pharmacologic VTE Prophylaxis contraindicated due to Acute hemolytic anemia  Mechanical VTE Prophylaxis in Place: Yes    Patient Centered Rounds: I have performed bedside rounds with nursing staff today  Discussions with Specialists or Other Care Team Provider:  Oncology    Education and Discussions with Family / Patient: Discussed treatment plan with family and patient who agree with current plan; encouraged to ask questions and participate  Time Spent for Care: 30 minutes  More than 50% of total time spent on counseling and coordination of care as described above  Current Length of Stay: 1 day(s)    Current Patient Status: Inpatient   Certification Statement: The patient will continue to require additional inpatient hospital stay due to Treatment of anemia    Discharge Plan: To be determined    Code Status: Level 1 - Full Code      Subjective:   Patient seen examined bedside, no acute distress or discomfort noted  Overall, patient's respiratory status improved and hemoglobin now 7 2  Patient very anxious and has many questions about hemolytic anemia and its long-term prognosis  Will defer to Hematology/Oncology for those answers   haptoglobin low, reticulocytes high, and direct alexandra test is positive  Have started on 60 mg prednisone daily; no GI bleed suspected  Monitor overnight and transfuse as needed  Objective:     Vitals:   Temp (24hrs), Av 7 °F (36 5 °C), Min:97 4 °F (36 3 °C), Max:97 8 °F (36 6 °C)    Temp:  [97 4 °F (36 3 °C)-97 8 °F (36 6 °C)] 97 8 °F (36 6 °C)  HR:  [59-69] 65  Resp:  [18-20] 20  BP: (128-140)/(66-75) 140/75  SpO2:  [97 %-99 %] 97 %  Body mass index is 28 86 kg/m²       Input and Output Summary (last 24 hours): Intake/Output Summary (Last 24 hours) at 4/10/2021 1619  Last data filed at 4/10/2021 0915  Gross per 24 hour   Intake 950 ml   Output 750 ml   Net 200 ml       Physical Exam:     Physical Exam  Vitals signs and nursing note reviewed  Constitutional:       Appearance: He is well-developed  HENT:      Head: Normocephalic and atraumatic  Eyes:      Conjunctiva/sclera: Conjunctivae normal    Neck:      Musculoskeletal: Neck supple  Cardiovascular:      Rate and Rhythm: Normal rate and regular rhythm  Heart sounds: No murmur  Pulmonary:      Effort: Pulmonary effort is normal  No respiratory distress  Breath sounds: Normal breath sounds  Abdominal:      General: There is distension  Palpations: Abdomen is soft  Tenderness: There is no abdominal tenderness  Skin:     General: Skin is warm and dry  Neurological:      Mental Status: He is alert and oriented to person, place, and time  Psychiatric:         Mood and Affect: Mood normal          Behavior: Behavior normal            Additional Data:     Labs:    Results from last 7 days   Lab Units 04/10/21  0539  04/09/21  1326  04/09/21  0045   WBC Thousand/uL 65 29*  --  74 33*   < > 89 58*   HEMOGLOBIN g/dL 7 2*   < > 6 7*   < > 4 6*   HEMATOCRIT % 23 0*   < > 20 7*   < > 14 9*   PLATELETS Thousands/uL 168  --  164   < > 181   BANDS PCT %  --   --  1  --   --    NEUTROS PCT %  --   --   --   --  6*   LYMPHS PCT %  --   --   --   --  90*   LYMPHO PCT %  --   --  84*  --   --    MONOS PCT %  --   --   --   --  4   MONO PCT %  --   --  0*  --   --    EOS PCT %  --   --  1  --  0    < > = values in this interval not displayed       Results from last 7 days   Lab Units 04/10/21  0539   SODIUM mmol/L 133*   POTASSIUM mmol/L 4 2   CHLORIDE mmol/L 100   CO2 mmol/L 25   BUN mg/dL 12   CREATININE mg/dL 0 77   ANION GAP mmol/L 8   CALCIUM mg/dL 8 4   ALBUMIN g/dL 3 7   TOTAL BILIRUBIN mg/dL 2 53*   ALK PHOS U/L 90   ALT U/L 16   AST U/L 20   GLUCOSE RANDOM mg/dL 104                           * I Have Reviewed All Lab Data Listed Above  * Additional Pertinent Lab Tests Reviewed: All Labs Within Last 24 Hours Reviewed    Imaging:    Imaging Reports Reviewed Today Include:  EGD  Imaging Personally Reviewed by Myself Includes:      Recent Cultures (last 7 days):           Last 24 Hours Medication List:   Current Facility-Administered Medications   Medication Dose Route Frequency Provider Last Rate    acetaminophen  975 mg Oral Q6H PRN Naya Rice PA-C      atorvastatin  20 mg Oral Daily With [employer], DO      fish oil  1,000 mg Oral Daily Prowers Anastasia, DO      metoprolol tartrate  25 mg Oral Q12H [facility] Prowers Anastasia, DO      multivitamin-minerals  1 tablet Oral Daily Prowers Anastasia, DO      ondansetron  4 mg Intravenous Q6H PRN Prowers Anastasia, DO      pantoprazole  40 mg Oral Q12H 1338 Luan Petersen MD      predniSONE  60 mg Oral Daily Terrie Duff MD      senna-docusate sodium  1 tablet Oral HS Prowers Anastasia, DO      sertraline  50 mg Oral Daily Prowers Anastasia, DO      tamsulosin  0 4 mg Oral Daily With Dinner Prowers [place], DO          Today, Patient Was Seen By: Merlene Humphreys MD    ** Please Note: Dictation voice to text software may have been used in the creation of this document   **

## 2021-04-11 LAB
ABO GROUP BLD: NORMAL
ALBUMIN SERPL BCP-MCNC: 3.5 G/DL (ref 3.5–5)
ALP SERPL-CCNC: 87 U/L (ref 46–116)
ALT SERPL W P-5'-P-CCNC: 16 U/L (ref 12–78)
ANION GAP SERPL CALCULATED.3IONS-SCNC: 6 MMOL/L (ref 4–13)
AST SERPL W P-5'-P-CCNC: 16 U/L (ref 5–45)
BILIRUB SERPL-MCNC: 1.5 MG/DL (ref 0.2–1)
BLD GP AB SCN SERPL QL: NEGATIVE
BLD GP AB SCN SERPL QL: POSITIVE
BUN SERPL-MCNC: 12 MG/DL (ref 5–25)
CALCIUM SERPL-MCNC: 8.6 MG/DL (ref 8.3–10.1)
CHLORIDE SERPL-SCNC: 103 MMOL/L (ref 100–108)
CO2 SERPL-SCNC: 26 MMOL/L (ref 21–32)
CREAT SERPL-MCNC: 0.77 MG/DL (ref 0.6–1.3)
ERYTHROCYTE [DISTWIDTH] IN BLOOD BY AUTOMATED COUNT: 32.2 % (ref 11.6–15.1)
GFR SERPL CREATININE-BSD FRML MDRD: 93 ML/MIN/1.73SQ M
GLUCOSE SERPL-MCNC: 154 MG/DL (ref 65–140)
HCT VFR BLD AUTO: 22.4 % (ref 36.5–49.3)
HGB BLD-MCNC: 6.9 G/DL (ref 12–17)
MAGNESIUM SERPL-MCNC: 2.5 MG/DL (ref 1.6–2.6)
MCH RBC QN AUTO: 35.8 PG (ref 26.8–34.3)
MCHC RBC AUTO-ENTMCNC: 30.8 G/DL (ref 31.4–37.4)
MCV RBC AUTO: 116 FL (ref 82–98)
NRBC BLD AUTO-RTO: 0 /100 WBCS
PHOSPHATE SERPL-MCNC: 3.3 MG/DL (ref 2.3–4.1)
PLATELET # BLD AUTO: 166 THOUSANDS/UL (ref 149–390)
PMV BLD AUTO: 10.5 FL (ref 8.9–12.7)
POTASSIUM SERPL-SCNC: 4 MMOL/L (ref 3.5–5.3)
PROT SERPL-MCNC: 7.6 G/DL (ref 6.4–8.2)
RBC # BLD AUTO: 1.93 MILLION/UL (ref 3.88–5.62)
RH BLD: POSITIVE
SODIUM SERPL-SCNC: 135 MMOL/L (ref 136–145)
SPECIMEN EXPIRATION DATE: NORMAL
WBC # BLD AUTO: 66.4 THOUSAND/UL (ref 4.31–10.16)

## 2021-04-11 PROCEDURE — 86922 COMPATIBILITY TEST ANTIGLOB: CPT

## 2021-04-11 PROCEDURE — 86900 BLOOD TYPING SEROLOGIC ABO: CPT | Performed by: INTERNAL MEDICINE

## 2021-04-11 PROCEDURE — 86921 COMPATIBILITY TEST INCUBATE: CPT

## 2021-04-11 PROCEDURE — 80053 COMPREHEN METABOLIC PANEL: CPT | Performed by: INTERNAL MEDICINE

## 2021-04-11 PROCEDURE — 86901 BLOOD TYPING SEROLOGIC RH(D): CPT | Performed by: INTERNAL MEDICINE

## 2021-04-11 PROCEDURE — 86850 RBC ANTIBODY SCREEN: CPT | Performed by: INTERNAL MEDICINE

## 2021-04-11 PROCEDURE — P9040 RBC LEUKOREDUCED IRRADIATED: HCPCS

## 2021-04-11 PROCEDURE — 84100 ASSAY OF PHOSPHORUS: CPT | Performed by: INTERNAL MEDICINE

## 2021-04-11 PROCEDURE — 83735 ASSAY OF MAGNESIUM: CPT | Performed by: INTERNAL MEDICINE

## 2021-04-11 PROCEDURE — 99232 SBSQ HOSP IP/OBS MODERATE 35: CPT | Performed by: INTERNAL MEDICINE

## 2021-04-11 PROCEDURE — 85027 COMPLETE CBC AUTOMATED: CPT | Performed by: INTERNAL MEDICINE

## 2021-04-11 RX ADMIN — METOPROLOL TARTRATE 25 MG: 25 TABLET, FILM COATED ORAL at 22:01

## 2021-04-11 RX ADMIN — OMEGA-3 FATTY ACIDS CAP 1000 MG 1000 MG: 1000 CAP at 08:15

## 2021-04-11 RX ADMIN — Medication 1 TABLET: at 08:15

## 2021-04-11 RX ADMIN — TAMSULOSIN HYDROCHLORIDE 0.4 MG: 0.4 CAPSULE ORAL at 17:25

## 2021-04-11 RX ADMIN — PANTOPRAZOLE SODIUM 40 MG: 40 TABLET, DELAYED RELEASE ORAL at 08:15

## 2021-04-11 RX ADMIN — PREDNISONE 60 MG: 20 TABLET ORAL at 08:15

## 2021-04-11 RX ADMIN — ATORVASTATIN CALCIUM 20 MG: 20 TABLET, FILM COATED ORAL at 17:25

## 2021-04-11 RX ADMIN — METOPROLOL TARTRATE 25 MG: 25 TABLET, FILM COATED ORAL at 08:15

## 2021-04-11 RX ADMIN — PANTOPRAZOLE SODIUM 40 MG: 40 TABLET, DELAYED RELEASE ORAL at 22:01

## 2021-04-11 RX ADMIN — SERTRALINE HYDROCHLORIDE 50 MG: 50 TABLET ORAL at 08:15

## 2021-04-11 RX ADMIN — SENNOSIDES AND DOCUSATE SODIUM 1 TABLET: 8.6; 5 TABLET ORAL at 22:01

## 2021-04-11 RX ADMIN — MELATONIN TAB 3 MG 3 MG: 3 TAB at 22:01

## 2021-04-11 NOTE — PLAN OF CARE
Problem: Potential for Falls  Goal: Patient will remain free of falls  Description: INTERVENTIONS:  - Assess patient frequently for physical needs  -  Identify cognitive and physical deficits and behaviors that affect risk of falls    -  [place] fall precautions as indicated by assessment   - Educate patient/family on patient safety including physical limitations  - Instruct patient to call for assistance with activity based on assessment  - Modify environment to reduce risk of injury  - Consider OT/PT consult to assist with strengthening/mobility  4/11/2021 0825 by Tiffany Palacios RN  Outcome: Progressing  4/10/2021 1929 by Tiffany Palacios RN  Outcome: Progressing     Problem: PAIN - ADULT  Goal: Verbalizes/displays adequate comfort level or baseline comfort level  Description: Interventions:  - Encourage patient to monitor pain and request assistance  - Assess pain using appropriate pain scale  - Administer analgesics based on type and severity of pain and evaluate response  - Implement non-pharmacological measures as appropriate and evaluate response  - Consider cultural and social influences on pain and pain management  - Notify physician/advanced practitioner if interventions unsuccessful or patient reports new pain  4/11/2021 0825 by Tiffany Palacios RN  Outcome: Progressing  4/10/2021 1929 by Tiffany Palacios RN  Outcome: Progressing     Problem: INFECTION - ADULT  Goal: Absence or prevention of progression during hospitalization  Description: INTERVENTIONS:  - Assess and monitor for signs and symptoms of infection  - Monitor lab/diagnostic results  - Monitor all insertion sites, i e  indwelling lines, tubes, and drains  - Monitor endotracheal if appropriate and nasal secretions for changes in amount and color  - [place] appropriate cooling/warming therapies per order  - Administer medications as ordered  - Instruct and encourage patient and family to use good hand hygiene technique  - Identify and instruct in appropriate isolation precautions for identified infection/condition  4/11/2021 0825 by Jeanie Richmond RN  Outcome: Progressing  4/10/2021 1929 by Jeanie Richmond RN  Outcome: Progressing  Goal: Absence of fever/infection during neutropenic period  Description: INTERVENTIONS:  - Monitor WBC    4/11/2021 0825 by Jeanie Richmond RN  Outcome: Progressing  4/10/2021 1929 by Jeanie Richmond RN  Outcome: Progressing     Problem: SAFETY ADULT  Goal: Patient will remain free of falls  Description: INTERVENTIONS:  - Assess patient frequently for physical needs  -  Identify cognitive and physical deficits and behaviors that affect risk of falls    -  [place] fall precautions as indicated by assessment   - Educate patient/family on patient safety including physical limitations  - Instruct patient to call for assistance with activity based on assessment  - Modify environment to reduce risk of injury  - Consider OT/PT consult to assist with strengthening/mobility  4/11/2021 0825 by Jeanie Richmond RN  Outcome: Progressing  4/10/2021 1929 by Jeanie Richmond RN  Outcome: Progressing  Goal: Maintain or return to baseline ADL function  Description: INTERVENTIONS:  -  Assess patient's ability to carry out ADLs; assess patient's baseline for ADL function and identify physical deficits which impact ability to perform ADLs (bathing, care of mouth/teeth, toileting, grooming, dressing, etc )  - Assess/evaluate cause of self-care deficits   - Assess range of motion  - Assess patient's mobility; develop plan if impaired  - Assess patient's need for assistive devices and provide as appropriate  - Encourage maximum independence but intervene and supervise when necessary  - Involve family in performance of ADLs  - Assess for home care needs following discharge   - Consider OT consult to assist with ADL evaluation and planning for discharge  - Provide patient education as appropriate  4/11/2021 0825 by Riddhi Delgado RN  Outcome: Progressing  4/10/2021 1929 by Riddhi Delgado RN  Outcome: Progressing  Goal: Maintain or return mobility status to optimal level  Description: INTERVENTIONS:  - Assess patient's baseline mobility status (ambulation, transfers, stairs, etc )    - Identify cognitive and physical deficits and behaviors that affect mobility  - Identify mobility aids required to assist with transfers and/or ambulation (gait belt, sit-to-stand, lift, walker, cane, etc )  - [place] fall precautions as indicated by assessment  - Record patient progress and toleration of activity level on Mobility SBAR; progress patient to next Phase/Stage  - Instruct patient to call for assistance with activity based on assessment  - Consider rehabilitation consult to assist with strengthening/weightbearing, etc   4/11/2021 0825 by Riddhi Delgado RN  Outcome: Progressing  4/10/2021 1929 by Riddhi Delgado RN  Outcome: Progressing     Problem: DISCHARGE PLANNING  Goal: Discharge to home or other facility with appropriate resources  Description: INTERVENTIONS:  - Identify barriers to discharge w/patient and caregiver  - Arrange for needed discharge resources and transportation as appropriate  - Identify discharge learning needs (meds, wound care, etc )  - Arrange for interpretive services to assist at discharge as needed  - Refer to Case Management Department for coordinating discharge planning if the patient needs post-hospital services based on physician/advanced practitioner order or complex needs related to functional status, cognitive ability, or social support system  4/11/2021 0825 by Riddhi Delgado RN  Outcome: Progressing  4/10/2021 1929 by Riddhi Delgado RN  Outcome: Progressing     Problem: Knowledge Deficit  Goal: Patient/family/caregiver demonstrates understanding of disease process, treatment plan, medications, and discharge instructions  Description: Complete learning assessment and assess knowledge base    Interventions:  - Provide teaching at level of understanding  - Provide teaching via preferred learning methods  4/11/2021 0825 by Pradeep Cornejo RN  Outcome: Progressing  4/10/2021 1929 by Pradeep Cornejo, RN  Outcome: Progressing

## 2021-04-11 NOTE — ASSESSMENT & PLAN NOTE
· Sodium 128 on admission, did have prior chronic hyponatremia 131-134 noted however some recent normal values  · Na 135 on repeat labs   · monitor with BMP

## 2021-04-11 NOTE — ASSESSMENT & PLAN NOTE
Patient noted on admission to have EKG showing atrial fibrillation; a few minutes prior to that EKG, he was normal sinus rhythm  As per primary care note on 03/2021; has been diagnosed with sinus Jimi-tachycardia syndrome; on metoprolol   Heart rate and blood pressure appear well controlled this admission; will continue metoprolol  Denies chest pain, echo shows no systolic dysfunction, EF 38%  Chads Vasc-2; however patient with long history of CLL and currently requiring blood transfusions  Will consider consult to Cardiology and defer discussion of anticoagulation until current hemolytic anemia crisis resolved

## 2021-04-11 NOTE — ASSESSMENT & PLAN NOTE
· Follows with Dr Samantha Díaz, echo 01/2021 EF 60% with peak pressure 75 mmHg  · May need IV diuresis if shortness of breath develops during blood transfusion, close monitoring

## 2021-04-11 NOTE — ASSESSMENT & PLAN NOTE
· Follows with Dr Amadou Morton, not on any treatment   · Likely hemolytic anemia consistent with diagnosis of CLL; followed by Hematology/Oncology

## 2021-04-11 NOTE — ASSESSMENT & PLAN NOTE
· POA with SOB found of have hgb 4 6 on admission   · Known CLL diagnosed in March 2010 under care of Dr Pinky Bustillo   · additionally with history of gastric vascular ectasia however denies any visible bleeding  · GI consulted -underwent EGD and evidence of GA ve disease, however unlikely cause of anemia  GI signed off  · Transfused 4 units PRBCs since admission  · Repeat H&H -7 3  · Monitor volume status   · Appreciate hematology recommendations, following  · Direct Narciso test positive, reticulocytes 15 93%, haptoglobin less than 10--all consistent with diagnosis of hemolytic anemia  · As per Hematology recommendations, have started 60 mg of prednisone daily    4/11-hemoglobin 6 9 on morning labs; will transfuse 1 unit of leuko reduced, irradiated PRBCs  Again, picture of autoimmune hemolytic anemia and discussed with Hematology again this morning  Continue prednisone and anticipate response in the next few days to weeks  Already, T bilirubin has trended down  Monitor and transfuse as needed

## 2021-04-11 NOTE — PROGRESS NOTES
[address] [facility]  Progress Note Aleyda Morales 1950, 78 y o  male MRN: [id number]  Unit/Bed#: [facility] 928-01 Encounter: [id number]  Primary Care Provider: Jose Eduardo Beck MD   Date and time admitted to hospital: 4/9/2021 12:14 AM    Dyspnea on exertion  Assessment & Plan  · Developing over past 1 week, history of pulmonary hypertension noted however suspect this is more likely in setting of profound anemia and will assess response to PRBC transfusions  · Troponin negative , vital stable  · He is on room air at home     Hyponatremia  Assessment & Plan  · Sodium 128 on admission, did have prior chronic hyponatremia 131-134 noted however some recent normal values  · Na 135 on repeat labs   · monitor with BMP    Mild depression ([facility] [address] )  Assessment & Plan  Continue zoloft     Abnormal EKG  Assessment & Plan  Patient noted on admission to have EKG showing atrial fibrillation; a few minutes prior to that EKG, he was normal sinus rhythm  As per primary care note on 03/2021; has been diagnosed with sinus Jimi-tachycardia syndrome; on metoprolol   Heart rate and blood pressure appear well controlled this admission; will continue metoprolol  Denies chest pain, echo shows no systolic dysfunction, EF 08%  Chads Vasc-2; however patient with long history of CLL and currently requiring blood transfusions  Will consider consult to Cardiology and defer discussion of anticoagulation until current hemolytic anemia crisis resolved        [facility]  Assessment & Plan  · Follows with Dr Yuliana Vazquez, echo 01/2021 EF 60% with peak pressure 75 mmHg  · May need IV diuresis if shortness of breath develops during blood transfusion, close monitoring     Esophageal reflux  Assessment & Plan  · Continue IV PPI for now    CLL (chronic lymphocytic leukemia) ([facility] [address] )  Assessment & Plan  · Follows with Dr Socrates Nino, not on any treatment   · Likely hemolytic anemia consistent with diagnosis of CLL; followed by Hematology/Oncology    Essential hypertension  Assessment & Plan  hold norvasc for now with profound anemia   Continue BB as BP tolerates     * Acute on chronic anemia  Assessment & Plan  · POA with SOB found of have hgb 4 6 on admission   · Known CLL diagnosed in March 2010 under care of Dr Sheree Montez   · additionally with history of gastric vascular ectasia however denies any visible bleeding  · GI consulted -underwent EGD and evidence of GA ve disease, however unlikely cause of anemia  GI signed off  · Transfused 4 units PRBCs since admission  · Repeat H&H -7 3  · Monitor volume status   · Appreciate hematology recommendations, following  · Direct Narciso test positive, reticulocytes 15 93%, haptoglobin less than 10--all consistent with diagnosis of hemolytic anemia  · As per Hematology recommendations, have started 60 mg of prednisone daily    4/11-hemoglobin 6 9 on morning labs; will transfuse 1 unit of leuko reduced, irradiated PRBCs  Again, picture of autoimmune hemolytic anemia and discussed with Hematology again this morning  Continue prednisone and anticipate response in the next few days to weeks  Already, T bilirubin has trended down  Monitor and transfuse as needed  VTE Pharmacologic Prophylaxis:   Pharmacologic: Pharmacologic VTE Prophylaxis contraindicated due to Anemia  Mechanical VTE Prophylaxis in Place: Yes    Patient Centered Rounds: I have performed bedside rounds with nursing staff today  Discussions with Specialists or Other Care Team Provider:  Hematology    Education and Discussions with Family / Patient: Discussed treatment plan with family and patient who agree with current plan; encouraged to ask questions and participate  Time Spent for Care: 30 minutes  More than 50% of total time spent on counseling and coordination of care as described above      Current Length of Stay: 2 day(s)    Current Patient Status: Inpatient   Certification Statement: The patient will continue to require additional inpatient hospital stay due to Treatment of autoimmune hemolytic anemia secondary to CLL    Discharge Plan: To be determined    Code Status: Level 1 - Full Code      Subjective:   Patient seen examined bedside, no acute distress or discomfort noted  Hemoglobin 6 9 this morning, transfuse 1 unit PRBCs  Had long discussion about the nature of patient's underlying disease and benefits of steroids to help improve his hemolytic anemia  Patient voiced understanding  EKG reviewed, patient noted to have abnormal rhythm on admission, however has been stable since then  Will avoid anticoagulation at this time secondary to underlying disease  Consider consult Cardiology moving forward  Blood pressure and vital stable, otherwise labs stable and followed by Oncology  Objective:     Vitals:   Temp (24hrs), Av 9 °F (36 6 °C), Min:97 7 °F (36 5 °C), Max:98 3 °F (36 8 °C)    Temp:  [97 7 °F (36 5 °C)-98 3 °F (36 8 °C)] 98 3 °F (36 8 °C)  HR:  [58-69] 63  Resp:  [16-20] 18  BP: (127-150)/(68-75) 148/71  SpO2:  [94 %-97 %] 94 %  Body mass index is 28 96 kg/m²  Input and Output Summary (last 24 hours): Intake/Output Summary (Last 24 hours) at 2021 1534  Last data filed at 2021 1030  Gross per 24 hour   Intake 600 ml   Output 1000 ml   Net -400 ml       Physical Exam:     Physical Exam  Vitals signs and nursing note reviewed  Constitutional:       Appearance: He is well-developed  HENT:      Head: Normocephalic and atraumatic  Eyes:      Conjunctiva/sclera: Conjunctivae normal    Neck:      Musculoskeletal: Neck supple  Cardiovascular:      Rate and Rhythm: Normal rate and regular rhythm  Heart sounds: No murmur  Pulmonary:      Effort: Pulmonary effort is normal  No respiratory distress  Breath sounds: Normal breath sounds  Abdominal:      General: There is distension  Palpations: Abdomen is soft  Tenderness:  There is no abdominal tenderness  Skin:     General: Skin is warm and dry  Neurological:      Mental Status: He is alert and oriented to person, place, and time  Psychiatric:         Mood and Affect: Mood normal          Behavior: Behavior normal            Additional Data:     Labs:    Results from last 7 days   Lab Units 04/11/21  0653  04/09/21  1326  04/09/21  0045   WBC Thousand/uL 66 40*   < > 74 33*   < > 89 58*   HEMOGLOBIN g/dL 6 9*   < > 6 7*   < > 4 6*   HEMATOCRIT % 22 4*   < > 20 7*   < > 14 9*   PLATELETS Thousands/uL 166   < > 164   < > 181   BANDS PCT %  --   --  1  --   --    NEUTROS PCT %  --   --   --   --  6*   LYMPHS PCT %  --   --   --   --  90*   LYMPHO PCT %  --   --  84*  --   --    MONOS PCT %  --   --   --   --  4   MONO PCT %  --   --  0*  --   --    EOS PCT %  --   --  1  --  0    < > = values in this interval not displayed  Results from last 7 days   Lab Units 04/11/21  0653   SODIUM mmol/L 135*   POTASSIUM mmol/L 4 0   CHLORIDE mmol/L 103   CO2 mmol/L 26   BUN mg/dL 12   CREATININE mg/dL 0 77   ANION GAP mmol/L 6   CALCIUM mg/dL 8 6   ALBUMIN g/dL 3 5   TOTAL BILIRUBIN mg/dL 1 50*   ALK PHOS U/L 87   ALT U/L 16   AST U/L 16   GLUCOSE RANDOM mg/dL 154*                           * I Have Reviewed All Lab Data Listed Above  * Additional Pertinent Lab Tests Reviewed:  All Labs Within Last 24 Hours Reviewed    Imaging:    Imaging Reports Reviewed Today Include:   Imaging Personally Reviewed by Myself Includes:      Recent Cultures (last 7 days):           Last 24 Hours Medication List:   Current Facility-Administered Medications   Medication Dose Route Frequency Provider Last Rate    acetaminophen  975 mg Oral Q6H PRN Olinda Vital PA-C      atorvastatin  20 mg Oral Daily With [employer], DO      fish oil  1,000 mg Oral Daily Le Patches, DO      melatonin  3 mg Oral HS PRN Mellisa Sanchez PA-C      metoprolol tartrate  25 mg Oral Q12H [facility]      multivitamin-minerals  1 tablet Oral Daily [place]um[place], DO      ondansetron  4 mg Intravenous Q6H PRN [place]ummer, DO      pantoprazole  40 mg Oral Q12H [address] Mari Farnsworth MD      predniSONE  60 mg Oral Daily Mari Farnsworth MD      senna-docusate sodium  1 tablet Oral HS [place]ummer, DO      sertraline  50 mg Oral Daily [place]ummer, DO      tamsulosin  0 4 mg Oral Daily With Hardeep Kenny DO          Today, Patient Was Seen By: Gonzalez Elliott MD    ** Please Note: Dictation voice to text software may have been used in the creation of this document   **

## 2021-04-12 VITALS
DIASTOLIC BLOOD PRESSURE: 67 MMHG | RESPIRATION RATE: 16 BRPM | WEIGHT: 190.04 LBS | HEIGHT: 68 IN | OXYGEN SATURATION: 98 % | TEMPERATURE: 97.9 F | HEART RATE: 56 BPM | SYSTOLIC BLOOD PRESSURE: 136 MMHG | BODY MASS INDEX: 28.8 KG/M2

## 2021-04-12 PROBLEM — K31.819 GAVE (GASTRIC ANTRAL VASCULAR ECTASIA): Status: ACTIVE | Noted: 2021-04-12

## 2021-04-12 LAB
ABO GROUP BLD BPU: NORMAL
ANION GAP SERPL CALCULATED.3IONS-SCNC: 6 MMOL/L (ref 4–13)
ANTIBODY ELUTION #1: NORMAL
BPU ID: NORMAL
BUN SERPL-MCNC: 14 MG/DL (ref 5–25)
CA TITR SERPL AGGL: NEGATIVE {TITER}
CALCIUM SERPL-MCNC: 8.3 MG/DL (ref 8.3–10.1)
CHLORIDE SERPL-SCNC: 105 MMOL/L (ref 100–108)
CO2 SERPL-SCNC: 26 MMOL/L (ref 21–32)
CREAT SERPL-MCNC: 0.76 MG/DL (ref 0.6–1.3)
CROSSMATCH: NORMAL
ERYTHROCYTE [DISTWIDTH] IN BLOOD BY AUTOMATED COUNT: 31.4 % (ref 11.6–15.1)
GFR SERPL CREATININE-BSD FRML MDRD: 94 ML/MIN/1.73SQ M
GLUCOSE SERPL-MCNC: 101 MG/DL (ref 65–140)
HCT VFR BLD AUTO: 24.4 % (ref 36.5–49.3)
HGB BLD-MCNC: 7.5 G/DL (ref 12–17)
MCH RBC QN AUTO: 35.4 PG (ref 26.8–34.3)
MCHC RBC AUTO-ENTMCNC: 30.7 G/DL (ref 31.4–37.4)
MCV RBC AUTO: 115 FL (ref 82–98)
NRBC BLD AUTO-RTO: 0 /100 WBCS
PLATELET # BLD AUTO: 156 THOUSANDS/UL (ref 149–390)
PMV BLD AUTO: 10.7 FL (ref 8.9–12.7)
POTASSIUM SERPL-SCNC: 4 MMOL/L (ref 3.5–5.3)
RBC # BLD AUTO: 2.12 MILLION/UL (ref 3.88–5.62)
SODIUM SERPL-SCNC: 137 MMOL/L (ref 136–145)
UNIT DISPENSE STATUS: NORMAL
UNIT PRODUCT CODE: NORMAL
UNIT RH: NORMAL
WBC # BLD AUTO: 65.24 THOUSAND/UL (ref 4.31–10.16)

## 2021-04-12 PROCEDURE — 80048 BASIC METABOLIC PNL TOTAL CA: CPT | Performed by: INTERNAL MEDICINE

## 2021-04-12 PROCEDURE — 85027 COMPLETE CBC AUTOMATED: CPT | Performed by: INTERNAL MEDICINE

## 2021-04-12 PROCEDURE — 99239 HOSP IP/OBS DSCHRG MGMT >30: CPT | Performed by: INTERNAL MEDICINE

## 2021-04-12 PROCEDURE — 99233 SBSQ HOSP IP/OBS HIGH 50: CPT | Performed by: INTERNAL MEDICINE

## 2021-04-12 RX ORDER — CYANOCOBALAMIN (VITAMIN B-12) 500 MCG
500 TABLET ORAL DAILY
Qty: 30 TABLET | Refills: 0 | Status: SHIPPED | OUTPATIENT
Start: 2021-04-12 | End: 2022-03-16

## 2021-04-12 RX ORDER — PREDNISONE 20 MG/1
60 TABLET ORAL DAILY
Qty: 90 TABLET | Refills: 0 | Status: SHIPPED | OUTPATIENT
Start: 2021-04-13 | End: 2021-05-04 | Stop reason: SDUPTHER

## 2021-04-12 RX ORDER — LANOLIN ALCOHOL/MO/W.PET/CERES
400 CREAM (GRAM) TOPICAL DAILY
Qty: 30 TABLET | Refills: 0 | Status: SHIPPED | OUTPATIENT
Start: 2021-04-12

## 2021-04-12 RX ORDER — LANOLIN ALCOHOL/MO/W.PET/CERES
3 CREAM (GRAM) TOPICAL
Qty: 30 TABLET | Refills: 0 | Status: SHIPPED | OUTPATIENT
Start: 2021-04-12 | End: 2021-05-12

## 2021-04-12 RX ADMIN — OMEGA-3 FATTY ACIDS CAP 1000 MG 1000 MG: 1000 CAP at 08:47

## 2021-04-12 RX ADMIN — PANTOPRAZOLE SODIUM 40 MG: 40 TABLET, DELAYED RELEASE ORAL at 08:51

## 2021-04-12 RX ADMIN — METOPROLOL TARTRATE 25 MG: 25 TABLET, FILM COATED ORAL at 08:51

## 2021-04-12 RX ADMIN — Medication 1 TABLET: at 08:47

## 2021-04-12 RX ADMIN — PREDNISONE 60 MG: 20 TABLET ORAL at 08:47

## 2021-04-12 RX ADMIN — SERTRALINE HYDROCHLORIDE 50 MG: 50 TABLET ORAL at 08:51

## 2021-04-12 NOTE — ASSESSMENT & PLAN NOTE
Patient presented with a hemoglobin as low as 4 6 on admission, symptomatic with shortness of breath and generalized fatigue  Baseline hemoglobin previously around 10  Further workup revealed autoimmune hemolytic anemia in the background of his chronic lymphocytic leukemia  Hematology/oncology was on board, initiated prednisone 60 mg daily  Hemoglobin on discharge is 7 5, patient is currently asymptomatic  He was given a script for a CBC to be done in 1 week and instructed to follow-up with his oncologist in 2 weeks  Folate and B12 tablets were also provided

## 2021-04-12 NOTE — ASSESSMENT & PLAN NOTE
· Follows with Dr Darrell Rhodes, not on any treatment   · Chronic leukocytosis; noted to be 65,000 today which is actually close to his baseline; no signs of hyperviscosity or leuko stasis noted (no visual changes, severe headache, bleeding, etc )  · CBC to be done in 1 week; follow-up with Heme-Onc in 2 weeks

## 2021-04-12 NOTE — ASSESSMENT & PLAN NOTE
· Follows with Dr Maira Hernandez, echo 01/2021 EF 60% with peak pressure 75 mmHg  · Did not require any IV diuresis during his hospital stay; shortness of breath resolved with improvement in anemia

## 2021-04-12 NOTE — ASSESSMENT & PLAN NOTE
Upon further review of admission EKG, P waves are noted prior to QRS therefore ruling out atrial fibrillation

## 2021-04-12 NOTE — ASSESSMENT & PLAN NOTE
Patient does have a history of gastric antral vascular ectasia, no evidence of GI bleed during his hospital stay or prior to admission but given his severe anemia, GI was consulted he underwent EGD with repeat APC to residual gave tissue but no evidence of bleeding at that time either  Continue PPI therapy

## 2021-04-12 NOTE — DISCHARGE SUMMARY
[address] [facility]  Discharge- Maximo Servin 1950, 78 y o  male MRN: [id number]  Unit/Bed#: [facility] 928-01 Encounter: [id number]  Primary Care Provider: Reyes Orosco MD   Date and time admitted to hospital: 4/9/2021 12:14 AM    GAVE (gastric antral vascular ectasia)  Assessment & Plan  Patient does have a history of gastric antral vascular ectasia, no evidence of GI bleed during his hospital stay or prior to admission but given his severe anemia, GI was consulted he underwent EGD with repeat APC to residual gave tissue but no evidence of bleeding at that time either  Continue PPI therapy  Dyspnea on exertion  Assessment & Plan  · Developing over past 1 week, history of pulmonary hypertension noted however suspect this is more likely in setting of profound anemia and will assess response to PRBC transfusions  · Troponin negative , vital stable  · He is on room air at home     Mild depression [facility])  Assessment & Plan  Continue zoloft     Abnormal EKG  Assessment & Plan  Upon further review of admission EKG, P waves are noted prior to QRS therefore ruling out atrial fibrillation  Pulmonary hypertension ([address] )  Assessment & Plan  · Follows with Dr Sharyle Pho, echo 01/2021 EF 60% with peak pressure 75 mmHg  · Did not require any IV diuresis during his hospital stay; shortness of breath resolved with improvement in anemia      Esophageal reflux  Assessment & Plan  Resume home dose of PPI    CLL (chronic lymphocytic leukemia) (HCC)  Assessment & Plan  · Follows with Dr Nohemy David, not on any treatment   · Chronic leukocytosis; noted to be 65,000 today which is actually close to his baseline; no signs of hyperviscosity or leuko stasis noted (no visual changes, severe headache, bleeding, etc )  · CBC to be done in 1 week; follow-up with Heme-Onc in 2 weeks    Essential hypertension  Assessment & Plan  Blood pressure in the 150s on discharge; amlodipine restarted on discharge, continue home dose of metoprolol  * Acute on chronic anemia  Assessment & Plan  Patient presented with a hemoglobin as low as 4 6 on admission, symptomatic with shortness of breath and generalized fatigue  Baseline hemoglobin previously around 10  Further workup revealed autoimmune hemolytic anemia in the background of his chronic lymphocytic leukemia  Hematology/oncology was on board, initiated prednisone 60 mg daily  Hemoglobin on discharge is 7 5, patient is currently asymptomatic  He was given a script for a CBC to be done in 1 week and instructed to follow-up with his oncologist in 2 weeks  Folate and B12 tablets were also provided  Resolved Problems  Date Reviewed: 4/12/2021    None          Admission Date:   Admission Orders (From admission, onward)     Ordered        04/09/21 0226  Inpatient Admission  Once                     Admitting Diagnosis: Hyponatremia [E87 1]  SOB (shortness of breath) [R06 02]  Dyspnea on exertion [R06 00]  CLL (chronic lymphocytic leukemia) (HCC) [C91 10]  Stable angina (HCC) [I20 8]  Acute anemia [D64 9]    HPI:  53-year-old male with a history of chronic lymphocytic leukemia who presented with complaints of shortness of breath and generalized fatigue over 1 week prior to admission  Procedures Performed: No orders of the defined types were placed in this encounter  Summary of Hospital Course:   Hemoglobin on admission of 4 6 (baseline around 10)  Further workup revealed autoimmune hemolytic anemia in the background of his chronic lymphocytic leukemia, oncology consulted who initiated prednisone 60 mg daily for which he is to continue on discharge and follow-up with them in 2 weeks, CBC in 1 week  Hemoglobin on discharge of 7 5 with total bilirubin downtrending likely signifying decreased and hemolytic activity    Given his history of gastric antral vascular ectasia, GI was consulted, underwent repeat EGD without signs of active bleeding but did undergo a PC to some residual tissue  Patient is clinically and hemodynamically stable on discharge  Significant Findings, Care, Treatment and Services Provided:   CT abdomen pelvis wo contrast   Final Result by Diana Chong DO (04/09 2010)      Diffuse urinary bladder wall thickening without surrounding inflammatory change  Correlate clinically for cystitis  Several bladder diverticula are seen as on the prior study  Splenomegaly  Colonic diverticulosis without evidence for acute diverticulitis  Workstation performed: [id number]         XR chest 1 view portable   Final Result by Ge Rodrigues MD (04/09 1051)   No acute cardiopulmonary disease  Stable            Workstation performed: [id number]           EGD-  · The esophagus appeared normal   · Small sliding hiatal hernia (type I hiatal hernia) - GE junction 38 cm from the incisors, diaphragmatic impression 40 cm from the incisors  · Gastric antral vascular ectasia in the antrum; ablated residual tissue with argon plasma coagulation  · Ten or more benign-appearing polyps measuring smaller than 5 mm in the stomach  · The 1st part of the duodenum and 2nd part of the duodenum appeared normal     Pending labs-  Cold agglutinin titer and cryoglobulin levels  Complications: none    Condition at Discharge: stable      Physical Exam -   Gen: in recliner; room air  HEENT: NC/AT, PERRLA, no scleral icterus, Trachea is central  RS: symmetric, speaking in full sentences, CTA bilaterally  CVS:  RRR, S1-S2 heard without murmurs, no lower extremity edema, radial pulses 2+, capillary refill less than 2 seconds  Abdomen:  Soft, NT/ND, bowel sounds heard  No rebound tenderness or involuntary guarding  MSK:  Warm  Moves all 4 extremities  :  No [place]   Neuro:  AAO x3  Psych:  Cooperative        Discharge instructions/Information to patient and family:   See after visit summary for information provided to patient and family  Provisions for Follow-Up Care:  See after visit summary for information related to follow-up care and any pertinent home health orders  PCP: Gt Perez MD    Disposition: Home    Planned Readmission: No    Discharge Statement   I spent 35 minutes discharging the patient  This time was spent on the day of discharge  I had direct contact with the patient on the day of discharge  Additional documentation is required if more than 30 minutes were spent on discharge  Discharge Medications:  See after visit summary for reconciled discharge medications provided to patient and family

## 2021-04-12 NOTE — PLAN OF CARE
Problem: Potential for Falls  Goal: Patient will remain free of falls  Description: INTERVENTIONS:  - Assess patient frequently for physical needs  -  Identify cognitive and physical deficits and behaviors that affect risk of falls    -  [place] fall precautions as indicated by assessment   - Educate patient/family on patient safety including physical limitations  - Instruct patient to call for assistance with activity based on assessment  - Modify environment to reduce risk of injury  - Consider OT/PT consult to assist with strengthening/mobility  Outcome: Completed     Problem: PAIN - ADULT  Goal: Verbalizes/displays adequate comfort level or baseline comfort level  Description: Interventions:  - Encourage patient to monitor pain and request assistance  - Assess pain using appropriate pain scale  - Administer analgesics based on type and severity of pain and evaluate response  - Implement non-pharmacological measures as appropriate and evaluate response  - Consider cultural and social influences on pain and pain management  - Notify physician/advanced practitioner if interventions unsuccessful or patient reports new pain  Outcome: Completed     Problem: INFECTION - ADULT  Goal: Absence or prevention of progression during hospitalization  Description: INTERVENTIONS:  - Assess and monitor for signs and symptoms of infection  - Monitor lab/diagnostic results  - Monitor all insertion sites, i e  indwelling lines, tubes, and drains  - Monitor endotracheal if appropriate and nasal secretions for changes in amount and color  - [place] appropriate cooling/warming therapies per order  - Administer medications as ordered  - Instruct and encourage patient and family to use good hand hygiene technique  - Identify and instruct in appropriate isolation precautions for identified infection/condition  Outcome: Completed  Goal: Absence of fever/infection during neutropenic period  Description: INTERVENTIONS:  - Monitor WBC    Outcome: Completed     Problem: SAFETY ADULT  Goal: Patient will remain free of falls  Description: INTERVENTIONS:  - Assess patient frequently for physical needs  -  Identify cognitive and physical deficits and behaviors that affect risk of falls    -  [place] fall precautions as indicated by assessment   - Educate patient/family on patient safety including physical limitations  - Instruct patient to call for assistance with activity based on assessment  - Modify environment to reduce risk of injury  - Consider OT/PT consult to assist with strengthening/mobility  Outcome: Completed  Goal: Maintain or return to baseline ADL function  Description: INTERVENTIONS:  -  Assess patient's ability to carry out ADLs; assess patient's baseline for ADL function and identify physical deficits which impact ability to perform ADLs (bathing, care of mouth/teeth, toileting, grooming, dressing, etc )  - Assess/evaluate cause of self-care deficits   - Assess range of motion  - Assess patient's mobility; develop plan if impaired  - Assess patient's need for assistive devices and provide as appropriate  - Encourage maximum independence but intervene and supervise when necessary  - Involve family in performance of ADLs  - Assess for home care needs following discharge   - Consider OT consult to assist with ADL evaluation and planning for discharge  - Provide patient education as appropriate  Outcome: Completed  Goal: Maintain or return mobility status to optimal level  Description: INTERVENTIONS:  - Assess patient's baseline mobility status (ambulation, transfers, stairs, etc )    - Identify cognitive and physical deficits and behaviors that affect mobility  - Identify mobility aids required to assist with transfers and/or ambulation (gait belt, sit-to-stand, lift, walker, cane, etc )  - [place] fall precautions as indicated by assessment  - Record patient progress and toleration of activity level on Mobility SBAR; progress patient to next Phase/Stage  - Instruct patient to call for assistance with activity based on assessment  - Consider rehabilitation consult to assist with strengthening/weightbearing, etc   Outcome: Completed     Problem: DISCHARGE PLANNING  Goal: Discharge to home or other facility with appropriate resources  Description: INTERVENTIONS:  - Identify barriers to discharge w/patient and caregiver  - Arrange for needed discharge resources and transportation as appropriate  - Identify discharge learning needs (meds, wound care, etc )  - Arrange for interpretive services to assist at discharge as needed  - Refer to Case Management Department for coordinating discharge planning if the patient needs post-hospital services based on physician/advanced practitioner order or complex needs related to functional status, cognitive ability, or social support system  Outcome: Completed     Problem: Knowledge Deficit  Goal: Patient/family/caregiver demonstrates understanding of disease process, treatment plan, medications, and discharge instructions  Description: Complete learning assessment and assess knowledge base    Interventions:  - Provide teaching at level of understanding  - Provide teaching via preferred learning methods  Outcome: Completed

## 2021-04-12 NOTE — PROGRESS NOTES
Oncology Progress Note  Carry Kawasaki 78 y o  male MRN: [id number]  Unit/Bed#: [facility] 928-01 Encounter: [id number]      /67   Pulse 56   Temp 97 9 °F (36 6 °C)   Resp 16   Ht 5' 8" (1 727 m)   Wt 86 2 kg (190 lb 0 6 oz)   SpO2 98%   BMI 28 89 kg/m²     Subjective:  His reticulocyte count was highly elevated  Narciso test was positive  This is consistent with autoimmune hemolytic anemia, associated with CLL  He already started prednisone 60 mg daily  His hemoglobin is stable  He feels well  He is afebrile  She has no complaint of pain  Objective:    General Appearance:    Alert, oriented        Eyes:    PERRL   Ears:    Normal external ear canals, both ears   Nose:   Nares normal, septum midline   Throat:   Mucosa moist  Pharynx without injection  Neck:   Supple       Lungs:     Clear to auscultation bilaterally   Chest Wall:    No tenderness or deformity    Heart:    Regular rate and rhythm       Abdomen:     Soft, non-tender, bowel sounds +, no organomegaly           Extremities:   Extremities no cyanosis or edema       Skin:   no rash or icterus      Lymph nodes:   Cervical, supraclavicular, and axillary nodes normal   Neurologic:   CNII-XII intact, normal strength, sensation and reflexes     throughout        Recent Results (from the past 48 hour(s))   CBC and differential    Collection Time: 04/11/21  6:53 AM   Result Value Ref Range    WBC 66 40 (HH) 4 31 - 10 16 Thousand/uL    RBC 1 93 (L) 3 88 - 5 62 Million/uL    Hemoglobin 6 9 (LL) 12 0 - 17 0 g/dL    Hematocrit 22 4 (L) 36 5 - 49 3 %     (H) 82 - 98 fL    MCH 35 8 (H) 26 8 - 34 3 pg    MCHC 30 8 (L) 31 4 - 37 4 g/dL    RDW 32 2 (H) 11 6 - 15 1 %    MPV 10 5 8 9 - 12 7 fL    Platelets 850 254 - 546 Thousands/uL    nRBC 0 /100 WBCs   Comprehensive metabolic panel    Collection Time: 04/11/21  6:53 AM   Result Value Ref Range    Sodium 135 (L) 136 - 145 mmol/L    Potassium 4 0 3 5 - 5 3 mmol/L    Chloride 103 100 - 108 mmol/L CO2 26 21 - 32 mmol/L    ANION GAP 6 4 - 13 mmol/L    BUN 12 5 - 25 mg/dL    Creatinine 0 77 0 60 - 1 30 mg/dL    Glucose 154 (H) 65 - 140 mg/dL    Calcium 8 6 8 3 - 10 1 mg/dL    AST 16 5 - 45 U/L    ALT 16 12 - 78 U/L    Alkaline Phosphatase 87 46 - 116 U/L    Total Protein 7 6 6 4 - 8 2 g/dL    Albumin 3 5 3 5 - 5 0 g/dL    Total Bilirubin 1 50 (H) 0 20 - 1 00 mg/dL    eGFR 93 ml/min/1 73sq m   Magnesium    Collection Time: 04/11/21  6:53 AM   Result Value Ref Range    Magnesium 2 5 1 6 - 2 6 mg/dL   Phosphorus    Collection Time: 04/11/21  6:53 AM   Result Value Ref Range    Phosphorus 3 3 2 3 - 4 1 mg/dL   Type and screen    Collection Time: 04/11/21  1:21 PM   Result Value Ref Range    ABO Grouping A     Rh Factor Positive     Antibody Screen Positive     Specimen Expiration Date 54553007    Antibody screen    Collection Time: 04/11/21  1:21 PM   Result Value Ref Range    Antibody Screen Negative    Prepare Leukoreduced RBC: 1 Units, Leukoreduced, Irradiated    Collection Time: 04/12/21  5:55 AM   Result Value Ref Range    Unit Product Code A9163B91     Unit Number [id number]     Unit ABO A     Unit [facility] POS     Crossmatch Compatible     Unit Dispense Status Presumed Trans    CBC and differential    Collection Time: 04/12/21  6:22 AM   Result Value Ref Range    WBC 65 24 (HH) 4 31 - 10 16 Thousand/uL    RBC 2 12 (L) 3 88 - 5 62 Million/uL    Hemoglobin 7 5 (L) 12 0 - 17 0 g/dL    Hematocrit 24 4 (L) 36 5 - 49 3 %     (H) 82 - 98 fL    MCH 35 4 (H) 26 8 - 34 3 pg    MCHC 30 7 (L) 31 4 - 37 4 g/dL    RDW 31 4 (H) 11 6 - 15 1 %    MPV 10 7 8 9 - 12 7 fL    Platelets 244 930 - 019 Thousands/uL    nRBC 0 /100 WBCs   Basic metabolic panel    Collection Time: 04/12/21  6:22 AM   Result Value Ref Range    Sodium 137 136 - 145 mmol/L    Potassium 4 0 3 5 - 5 3 mmol/L    Chloride 105 100 - 108 mmol/L    CO2 26 21 - 32 mmol/L    ANION GAP 6 4 - 13 mmol/L    BUN 14 5 - 25 mg/dL    Creatinine 0 76 0 60 - 1 30 mg/dL    Glucose 101 65 - 140 mg/dL    Calcium 8 3 8 3 - 10 1 mg/dL    eGFR 94 ml/min/1 73sq m         Ct Abdomen Pelvis Wo Contrast    Result Date: 4/9/2021  Narrative: CT ABDOMEN AND PELVIS WITHOUT IV CONTRAST INDICATION:   Abdominal distension abdominal distension  COMPARISON:  None  TECHNIQUE:  CT examination of the abdomen and pelvis was performed without intravenous contrast   Axial, sagittal, and coronal 2D reformatted images were created from the source data and submitted for interpretation  Radiation dose length product (DLP) for this visit:  1010 8 mGy-cm   This examination, like all CT scans performed in the [facility], was performed utilizing techniques to minimize radiation dose exposure, including the use of iterative  reconstruction and automated exposure control  Enteric contrast was administered  FINDINGS: ABDOMEN LOWER CHEST:  Trace bilateral pleural effusions  LIVER/BILIARY TREE:  Unremarkable  GALLBLADDER:  No calcified gallstones  No pericholecystic inflammatory change  SPLEEN:  Spleen is enlarged measuring 17 2 x 14 5 x 9 1 cm  PANCREAS:  Unremarkable  ADRENAL GLANDS:  Unremarkable  KIDNEYS/URETERS:  Unremarkable  No hydronephrosis  STOMACH AND BOWEL:  There is colonic diverticulosis without evidence of acute diverticulitis  APPENDIX:  No findings to suggest appendicitis  ABDOMINOPELVIC CAVITY:  No ascites  No pneumoperitoneum  No lymphadenopathy  VESSELS:  Unremarkable for patient's age  PELVIS REPRODUCTIVE ORGANS:  Unremarkable for patient's age  URINARY BLADDER:  The urinary bladder is distended with diffuse wall thickening  Several fairly large diverticuli are also noted  This appearance is unchanged from the prior study  Correlate clinically for cystitis  ABDOMINAL WALL/INGUINAL REGIONS:  There is an umbilical hernia containing fat similar in appearance to the prior study  OSSEOUS STRUCTURES:  No acute fracture or destructive osseous lesion       Impression: Diffuse urinary bladder wall thickening without surrounding inflammatory change  Correlate clinically for cystitis  Several bladder diverticula are seen as on the prior study  Splenomegaly  Colonic diverticulosis without evidence for acute diverticulitis  Workstation performed: [id number]     Xr Chest 1 View Portable    Result Date: 4/9/2021  Narrative: CHEST INDICATION:   dyspnea  COMPARISON:  2/22/2021 EXAM PERFORMED/VIEWS:  XR CHEST PORTABLE Single view FINDINGS: Cardiomediastinal silhouette remains mildly enlarged The lungs are clear  No pneumothorax or pleural effusion  Osseous structures appear within normal limits for patient age  Chronic posttraumatic right chest wall deformity with old rib fractures     Impression: No acute cardiopulmonary disease  Stable Workstation performed: [id number]         Assessment :  Chronic lymphocytic leukemia  Autoimmune hemolytic anemia  Plan: On prednisone 60 mg daily, his hemoglobin is stable  From hematology oncology standpoint, he may be discharged today  I am going to see him in 2 weeks with CBC  He will continue with prednisone 60 mg at least he see me in my office, when I would consider start tapering if he has adequate response

## 2021-04-12 NOTE — DISCHARGE INSTRUCTIONS
Upper Endoscopy   WHAT YOU NEED TO KNOW:   An upper endoscopy is also called an upper gastrointestinal (GI) endoscopy, or an esophagogastroduodenoscopy (EGD)  It is a procedure to examine the inside of your esophagus, stomach, and duodenum (first part of the small intestine) with a scope  You may feel bloated, gassy, or have some abdominal discomfort after your procedure  Your throat may be sore for 24 to 36 hours  You may burp or pass gas from air that is still inside your body  DISCHARGE INSTRUCTIONS:   Seek care immediately if:    You have sudden, severe abdominal pain   You have problems swallowing   You have a large amount of black, sticky bowel movements or blood in your bowel movements   You have sudden trouble breathing   You feel weak, lightheaded, or faint or your heart beats faster than normal for you  Contact your healthcare provider if:    You have a fever and chills   You have nausea or are vomiting   Your abdomen is bloated or feels full and hard   You have abdominal pain   You have black, sticky bowel movements or blood in your bowel movements   You have not had a bowel movement for 3 days after your procedure   You have rash or hives   You have questions or concerns about your procedure  Activity:    Do not lift, strain, or run for 24 hours after your procedure   Rest after your procedure  You have been given medicine to relax you  Do not drive or make important decisions until the day after your procedure  Return to your normal activity as directed   Relieve gas and discomfort from bloating by lying on your right side with a heating pad on your abdomen  You may need to take short walks to help the gas move out  Eat small meals until bloating is relieved  Follow up with your healthcare provider as directed: Write down your questions so you remember to ask them during your visits       If you take a blood thinner, please review the specific instructions from your endoscopist about when you should resume it  These can be found in the Recommendation and Your Medication list sections of this After Visit Summary  Hemolytic Anemia   WHAT YOU NEED TO KNOW:   What is hemolytic anemia? Hemolytic anemia is a condition that causes your red blood cells to die sooner than normal  Your bone marrow cannot make new red blood cells fast enough to replace the cells that have   Hemolytic anemia can be a short-term or long-term problem  What causes hemolytic anemia? · A disease you were born with, such as sickle cell anemia or thalassemia    · Certain medicines, such as antibiotics, antimalaria medicine, or acetaminophen    · Exposure to certain chemicals, such as arsenic or lead    · An autoimmune disorder, such as rheumatoid arthritis or lupus    · An organ transplant    · A bone marrow or blood stem cell transplant, a blood transfusion, or dialysis    · An artificial device, such as a heart valve    · An infection, such as mononucleosis or hepatitis    · Certain cancers, such as leukemia or lymphoma    What are the signs and symptoms of hemolytic anemia? · Feeling tired and weak    · Dizziness or trouble thinking clearly    · Unusual shortness of breath when you exercise    · A faster heartbeat than usual    · Pale skin    · Yellow skin and eyes, and dark urine    How is hemolytic anemia diagnosed and treated? Your healthcare provider will ask about your medical history and examine you  You may also need a blood test so your healthcare provider knows your red blood cell level  Treatment will depend on what caused your hemolytic anemia  You may need any of the following:  · Medicines  may be given to help trigger your bone marrow to start making new red blood cells, white blood cells, and platelets  Platelets are the sticky part of your blood that helps form clots to stop bleeding   You may also need medicines to help prevent your body from attacking its own bone marrow  This may help the bone marrow make more blood cells  · A blood transfusion  may be needed to replace blood you have lost  You may need more than one transfusion  · A bone marrow or stem cell transplant  is a procedure used to replace your stem cells with healthy cells  Stem cells are the part of the bone marrow that make red blood cells, white blood cells, and platelets  The transplanted stem cells return to the bone marrow, grow, and start producing new blood cells  · Surgery  may be used remove your spleen if it gets too large  Your spleen may get larger as it works harder to remove broken down red blood cells  As the spleen gets larger, even more cells are broken down  What can I do to manage hemolytic anemia? · Rest as much as possible  Hemolytic anemia can cause you to feel more tired than usual     · Eat a variety of healthy foods  This may help you have more energy and heal faster  Healthy foods include fruit, vegetables, whole-grain breads, low-fat dairy products, beans, lean meat, and fish  Ask your healthcare provider if you need to be on a special diet  · Drink liquids as directed  Ask your healthcare provider how much liquid to drink and which liquids are best for you  For most people, good liquids are water, juice, and milk  · Exercise as directed  Talk to your healthcare provider about the best exercise plan for you  Exercise can decrease your blood pressure and improve your health  Call 911 if:   · You have chest pain  When should I seek immediate care? · You have shortness of breath, even when you rest     · You have trouble thinking clearly  When should I contact my healthcare provider? · You have a fever, muscle aches, a cough, or sore throat  · You have signs of infection, such as redness, pain, or swelling in any part of your body  · Your skin is itchy, swollen, or has a rash   Your medicine may be causing these symptoms  · You have blood in your urine  · You are dizzy or more tired than usual     · You have questions or concerns about your condition or care  CARE AGREEMENT:   You have the right to help plan your care  Learn about your health condition and how it may be treated  Discuss treatment options with your healthcare providers to decide what care you want to receive  You always have the right to refuse treatment  The above information is an  only  It is not intended as medical advice for individual conditions or treatments  Talk to your doctor, nurse or pharmacist before following any medical regimen to see if it is safe and effective for you  © Copyright [address] Information is for End User's use only and may not be sold, redistributed or otherwise used for commercial purposes   All illustrations and images included in CareNotes® are the copyrighted property of [employer] D [employer] M [employer]  or [address] [place] [address]

## 2021-04-12 NOTE — ASSESSMENT & PLAN NOTE
Blood pressure in the 150s on discharge; amlodipine restarted on discharge, continue home dose of metoprolol

## 2021-04-13 ENCOUNTER — TRANSITIONAL CARE MANAGEMENT (OUTPATIENT)
Dept: FAMILY MEDICINE CLINIC | Facility: CLINIC | Age: 71
End: 2021-04-13

## 2021-04-13 ENCOUNTER — TELEPHONE (OUTPATIENT)
Dept: HEMATOLOGY ONCOLOGY | Facility: CLINIC | Age: 71
End: 2021-04-13

## 2021-04-13 DIAGNOSIS — D50.9 IRON DEFICIENCY ANEMIA, UNSPECIFIED IRON DEFICIENCY ANEMIA TYPE: ICD-10-CM

## 2021-04-13 DIAGNOSIS — C91.10 CLL (CHRONIC LYMPHOCYTIC LEUKEMIA) (HCC): Primary | ICD-10-CM

## 2021-04-13 LAB
ABO GROUP BLD BPU: NORMAL
BPU ID: NORMAL
CROSSMATCH: NORMAL
UNIT DISPENSE STATUS: NORMAL
UNIT PRODUCT CODE: NORMAL
UNIT RH: NORMAL

## 2021-04-13 NOTE — TELEPHONE ENCOUNTER
----- Message from Finn Yi MD sent at 4/13/2021 11:35 AM EDT -----  Can you let him do CBC and CMP 1-2 days before his next appointment?

## 2021-04-15 LAB — CRYOGLOB SER QL 1D COLD INC: NORMAL

## 2021-04-16 ENCOUNTER — CONSULT (OUTPATIENT)
Dept: FAMILY MEDICINE CLINIC | Facility: CLINIC | Age: 71
End: 2021-04-16
Payer: MEDICARE

## 2021-04-16 VITALS
OXYGEN SATURATION: 98 % | HEART RATE: 73 BPM | BODY MASS INDEX: 30.1 KG/M2 | SYSTOLIC BLOOD PRESSURE: 138 MMHG | WEIGHT: 198.6 LBS | DIASTOLIC BLOOD PRESSURE: 78 MMHG | HEIGHT: 68 IN

## 2021-04-16 DIAGNOSIS — I27.20 PULMONARY HYPERTENSION (HCC): Chronic | ICD-10-CM

## 2021-04-16 DIAGNOSIS — I49.5 SINUS BRADY-TACHY SYNDROME (HCC): ICD-10-CM

## 2021-04-16 DIAGNOSIS — I10 ESSENTIAL HYPERTENSION: Chronic | ICD-10-CM

## 2021-04-16 DIAGNOSIS — K31.819 GAVE (GASTRIC ANTRAL VASCULAR ECTASIA): ICD-10-CM

## 2021-04-16 DIAGNOSIS — R73.9 HYPERGLYCEMIA: ICD-10-CM

## 2021-04-16 DIAGNOSIS — E87.1 HYPONATREMIA: ICD-10-CM

## 2021-04-16 DIAGNOSIS — D64.9 ACUTE ON CHRONIC ANEMIA: ICD-10-CM

## 2021-04-16 DIAGNOSIS — E78.00 HYPERCHOLESTEROLEMIA: Chronic | ICD-10-CM

## 2021-04-16 DIAGNOSIS — U07.1 COVID-19: ICD-10-CM

## 2021-04-16 DIAGNOSIS — F32.A MILD DEPRESSION: Chronic | ICD-10-CM

## 2021-04-16 DIAGNOSIS — C91.10 CLL (CHRONIC LYMPHOCYTIC LEUKEMIA) (HCC): Chronic | ICD-10-CM

## 2021-04-16 DIAGNOSIS — D59.19 OTHER AUTOIMMUNE HEMOLYTIC ANEMIA (HCC): Primary | ICD-10-CM

## 2021-04-16 PROBLEM — D58.9 HEMOLYTIC ANEMIA (HCC): Status: ACTIVE | Noted: 2021-04-16

## 2021-04-16 PROCEDURE — 99496 TRANSJ CARE MGMT HIGH F2F 7D: CPT | Performed by: FAMILY MEDICINE

## 2021-04-16 NOTE — PROGRESS NOTES
Assessment/Plan:       Problem List Items Addressed This Visit        Digestive    GAVE (gastric antral vascular ectasia)     Continue routine GI follow-up  He has no signs of active bleeding at this time            Cardiovascular and Mediastinum    Essential hypertension (Chronic)     Blood pressure is starting to become elevated again  He will maintain routine follow-up with me  Continue his current regimen and notify me if his pressure is running above 140/90 at home  Pulmonary hypertension (HCC) (Chronic)     Continue close pulmonary follow-up  Sinus los-tachy syndrome ([facility] [address] )     Continue cardiology follow-up            Other    CLL (chronic lymphocytic leukemia) ([facility] [address] ) (Chronic)     Continue close follow-up with Hematology         Hypercholesterolemia (Chronic)    Mild depression (HCC) (Chronic)     Mood seems to be relatively stable today despite his recent admission  Hyponatremia     Monitor electrolytes prior to follow-up         Hyperglycemia     He has known prednisone  Continue with short-term follow-up  COVID-19     He seems to be relatively stable at this time  Acute on chronic anemia     Repeat labs next week         Hemolytic anemia (HCC) - Primary     Continue prednisone  Continue close follow-up with Hematology  He will be having blood work done in the next few days  Subjective:      Patient ID: Rebecca Vallejo is a 78 y o  male  HPI     TCM Call (since 3/19/2021)     Date and time call was made  4/13/2021  5:37 PM    Hospital care reviewed  Records reviewed    Patient was hospitialized at  [facility]        Date of Admission  04/09/21    Date of discharge  04/12/21    Diagnosis  acute anemia    Disposition  Home    Current Symptoms  None      TCM Call (since 3/19/2021)     Post hospital issues  None    Scheduled for follow up?   Yes    Did you obtain your prescribed medications  Yes    Do you need help managing your prescriptions or medications  No    Is transportation to your appointment needed  No    I have advised the patient to call PCP with any new or worsening symptoms  ISMAEL Mazariegos       The patient presents today for TCM  Patient presented with a hemoglobin as low as 4 6 on admission on April 9, 2021, symptomatic with shortness of breath and generalized fatigue  Baseline hemoglobin previously around 10  Further workup revealed autoimmune hemolytic anemia in the background of his chronic lymphocytic leukemia  Hematology/oncology was on board, initiated prednisone 60 mg daily  Hemoglobin on discharge is 7 5  He notes he feels better at this time  He is mildly fatigue  He denies any chest pain  He is mildly short of breath with exertion  He is following closely with Hematology  He has a history of hypertension  Home blood pressures have been mostly pretty well controlled  He did have several his blood pressure medications removed during hospitalization for COVID-19 in January        The following portions of the patient's history were reviewed and updated as appropriate: allergies, current medications, past family history, past medical history, past social history, past surgical history and problem list       Current Outpatient Medications:     amLODIPine (NORVASC) 5 mg tablet, TAKE ONE TABLET BY MOUTH EVERY EVENING, Disp: 90 tablet, Rfl: 3    aspirin 81 MG tablet, Take 1 tablet by mouth daily, Disp: , Rfl:     atorvastatin (LIPITOR) 20 mg tablet, TAKE ONE TABLET BY MOUTH ONCE EVERY DAY, Disp: , Rfl:     melatonin 3 mg, Take 1 tablet (3 mg total) by mouth daily at bedtime as needed (insomnia), Disp: 30 tablet, Rfl: 0    metoprolol tartrate (LOPRESSOR) 25 mg tablet, Take 1 tablet (25 mg total) by mouth every 12 (twelve) hours, Disp: 180 tablet, Rfl: 3    Multiple Vitamin (multivitamin) tablet, Take 1 tablet by mouth daily, Disp: 90 tablet, Rfl: 3    Omega-3 Fatty Acids (FISH OIL) 1,000 mg, Take by mouth daily, Disp: , Rfl:     omeprazole (PriLOSEC) 40 MG capsule, TAKE ONE CAPSULE BY MOUTH ONCE EVERY DAY, Disp: 90 capsule, Rfl: 3    predniSONE 20 mg tablet, Take 3 tablets (60 mg total) by mouth daily, Disp: 90 tablet, Rfl: 0    senna-docusate sodium (SENOKOT S) 8 6-50 mg per tablet, Take 1 tablet by mouth daily at bedtime, Disp: 30 tablet, Rfl: 0    sertraline (ZOLOFT) 50 mg tablet, TAKE ONE TABLET BY MOUTH EVERY DAY, Disp: 90 tablet, Rfl: 3    tamsulosin (FLOMAX) 0 4 mg, Take 1 capsule (0 4 mg total) by mouth daily with dinner CORRECTED SCRIPT! Note change in SIG and QUANTITY  Please process accordingly  Thank you! (9/1/20), Disp: 90 capsule, Rfl: 1    vitamin B-12 (CYANOCOBALAMIN) 500 MCG TABS, Take 1 tablet (500 mcg total) by mouth daily, Disp: 30 tablet, Rfl: 0    folic acid (FOLVITE) 452 mcg tablet, Take 1 tablet (400 mcg total) by mouth daily (Patient not taking: Reported on 4/16/2021), Disp: 30 tablet, Rfl: 0     Review of Systems   Constitutional: Negative for appetite change, chills, fatigue, fever and unexpected weight change  HENT: Negative for trouble swallowing  Eyes: Negative for visual disturbance  Respiratory: Negative for cough, chest tightness, shortness of breath and wheezing  Cardiovascular: Positive for leg swelling (trace edema)  Negative for chest pain and palpitations  Gastrointestinal: Negative for abdominal distention, abdominal pain, blood in stool, constipation and diarrhea  Endocrine: Negative for polyuria  Genitourinary: Negative for difficulty urinating and flank pain  Musculoskeletal: Negative for arthralgias and myalgias  Skin: Negative for rash  Neurological: Negative for dizziness and light-headedness  Hematological: Negative for adenopathy  Does not bruise/bleed easily  Psychiatric/Behavioral: Negative for dysphoric mood and sleep disturbance  The patient is not nervous/anxious            Objective:      /78 (BP Location: Left arm, Patient Position: Sitting, Cuff Size: Adult)   Pulse 73   Ht 5' 8" (1 727 m)   Wt 90 1 kg (198 lb 9 6 oz)   SpO2 98%   BMI 30 20 kg/m²          Physical Exam  Constitutional:       General: He is not in acute distress  Appearance: He is well-developed  He is obese  He is not diaphoretic  HENT:      Head: Normocephalic  Eyes:      General:         Right eye: No discharge  Left eye: No discharge  Pupils: Pupils are equal, round, and reactive to light  Neck:      Thyroid: No thyromegaly  Trachea: No tracheal deviation  Cardiovascular:      Rate and Rhythm: Normal rate and regular rhythm  Heart sounds: Normal heart sounds  No murmur  Pulmonary:      Effort: Pulmonary effort is normal  No respiratory distress  Breath sounds: No wheezing or rales  Abdominal:      General: There is no distension  Palpations: Abdomen is soft  Tenderness: There is no abdominal tenderness  Musculoskeletal: Normal range of motion  Lymphadenopathy:      Cervical: No cervical adenopathy  Skin:     General: Skin is warm  Findings: No erythema  Neurological:      General: No focal deficit present  Mental Status: He is alert and oriented to person, place, and time  Cranial Nerves: No cranial nerve deficit  Psychiatric:         Thought Content:  Thought content normal          Judgment: Judgment normal            Anniece Skiff, MD

## 2021-04-19 PROBLEM — Z96.641 HISTORY OF RIGHT HIP REPLACEMENT: Status: ACTIVE | Noted: 2021-04-19

## 2021-04-19 PROBLEM — M16.11 PRIMARY OSTEOARTHRITIS OF RIGHT HIP: Chronic | Status: RESOLVED | Noted: 2017-09-29 | Resolved: 2021-04-19

## 2021-04-19 PROBLEM — M16.11 ARTHRITIS OF RIGHT HIP: Status: RESOLVED | Noted: 2017-09-15 | Resolved: 2021-04-19

## 2021-04-19 NOTE — ASSESSMENT & PLAN NOTE
Continue prednisone  Continue close follow-up with Hematology  He will be having blood work done in the next few days

## 2021-04-19 NOTE — ASSESSMENT & PLAN NOTE
Blood pressure is starting to become elevated again  He will maintain routine follow-up with me  Continue his current regimen and notify me if his pressure is running above 140/90 at home

## 2021-04-21 ENCOUNTER — LAB (OUTPATIENT)
Dept: LAB | Age: 71
End: 2021-04-21
Payer: MEDICARE

## 2021-04-21 ENCOUNTER — TELEPHONE (OUTPATIENT)
Dept: HEMATOLOGY ONCOLOGY | Facility: CLINIC | Age: 71
End: 2021-04-21

## 2021-04-21 DIAGNOSIS — C91.10 CLL (CHRONIC LYMPHOCYTIC LEUKEMIA) (HCC): ICD-10-CM

## 2021-04-21 DIAGNOSIS — D64.9 ACUTE ANEMIA: ICD-10-CM

## 2021-04-21 DIAGNOSIS — D50.9 IRON DEFICIENCY ANEMIA, UNSPECIFIED IRON DEFICIENCY ANEMIA TYPE: ICD-10-CM

## 2021-04-21 LAB
ALBUMIN SERPL BCP-MCNC: 4 G/DL (ref 3.5–5)
ALP SERPL-CCNC: 77 U/L (ref 46–116)
ALT SERPL W P-5'-P-CCNC: 30 U/L (ref 12–78)
ANION GAP SERPL CALCULATED.3IONS-SCNC: 4 MMOL/L (ref 4–13)
ANISOCYTOSIS BLD QL SMEAR: PRESENT
AST SERPL W P-5'-P-CCNC: 9 U/L (ref 5–45)
BASOPHILS # BLD MANUAL: 0 THOUSAND/UL (ref 0–0.1)
BASOPHILS NFR MAR MANUAL: 0 % (ref 0–1)
BILIRUB SERPL-MCNC: 1.25 MG/DL (ref 0.2–1)
BUN SERPL-MCNC: 17 MG/DL (ref 5–25)
CALCIUM SERPL-MCNC: 8.6 MG/DL (ref 8.3–10.1)
CHLORIDE SERPL-SCNC: 102 MMOL/L (ref 100–108)
CO2 SERPL-SCNC: 29 MMOL/L (ref 21–32)
CREAT SERPL-MCNC: 0.79 MG/DL (ref 0.6–1.3)
EOSINOPHIL # BLD MANUAL: 0 THOUSAND/UL (ref 0–0.4)
EOSINOPHIL NFR BLD MANUAL: 0 % (ref 0–6)
ERYTHROCYTE [DISTWIDTH] IN BLOOD BY AUTOMATED COUNT: 27 % (ref 11.6–15.1)
GFR SERPL CREATININE-BSD FRML MDRD: 92 ML/MIN/1.73SQ M
GLUCOSE SERPL-MCNC: 102 MG/DL (ref 65–140)
HCT VFR BLD AUTO: 29.8 % (ref 36.5–49.3)
HGB BLD-MCNC: 8.9 G/DL (ref 12–17)
LYMPHOCYTES # BLD AUTO: 92 % (ref 14–44)
LYMPHOCYTES # BLD AUTO: 96.02 THOUSAND/UL (ref 0.6–4.47)
MCH RBC QN AUTO: 36.2 PG (ref 26.8–34.3)
MCHC RBC AUTO-ENTMCNC: 29.9 G/DL (ref 31.4–37.4)
MCV RBC AUTO: 121 FL (ref 82–98)
MONOCYTES # BLD AUTO: 3.13 THOUSAND/UL (ref 0–1.22)
MONOCYTES NFR BLD: 3 % (ref 4–12)
NEUTROPHILS # BLD MANUAL: 4.17 THOUSAND/UL (ref 1.85–7.62)
NEUTS SEG NFR BLD AUTO: 4 % (ref 43–75)
NRBC BLD AUTO-RTO: 0 /100 WBCS
PLATELET # BLD AUTO: 293 THOUSANDS/UL (ref 149–390)
PLATELET BLD QL SMEAR: ADEQUATE
PMV BLD AUTO: 9.6 FL (ref 8.9–12.7)
POLYCHROMASIA BLD QL SMEAR: PRESENT
POTASSIUM SERPL-SCNC: 4.1 MMOL/L (ref 3.5–5.3)
PROT SERPL-MCNC: 7.6 G/DL (ref 6.4–8.2)
RBC # BLD AUTO: 2.46 MILLION/UL (ref 3.88–5.62)
SMUDGE CELLS BLD QL SMEAR: PRESENT
SODIUM SERPL-SCNC: 135 MMOL/L (ref 136–145)
TOTAL CELLS COUNTED SPEC: 100
VARIANT LYMPHS # BLD AUTO: 1 %
WBC # BLD AUTO: 104.37 THOUSAND/UL (ref 4.31–10.16)

## 2021-04-21 PROCEDURE — 80053 COMPREHEN METABOLIC PANEL: CPT

## 2021-04-21 PROCEDURE — 85007 BL SMEAR W/DIFF WBC COUNT: CPT

## 2021-04-21 PROCEDURE — 85027 COMPLETE CBC AUTOMATED: CPT

## 2021-04-21 PROCEDURE — 36415 COLL VENOUS BLD VENIPUNCTURE: CPT

## 2021-04-21 NOTE — TELEPHONE ENCOUNTER
Patient has f/u appt with Dr Monika Edwards on 4/23/2021  Will pass on critical to Dr Cari Castañeda

## 2021-04-21 NOTE — TELEPHONE ENCOUNTER
Critical Results   Call Received From  [facility]   Lab Study WBC   Date Blood Work was Done  04/21/21 @9am   Relevant Information  [phone]

## 2021-04-22 ENCOUNTER — OFFICE VISIT (OUTPATIENT)
Dept: PULMONOLOGY | Facility: CLINIC | Age: 71
End: 2021-04-22
Payer: MEDICARE

## 2021-04-22 VITALS
RESPIRATION RATE: 18 BRPM | TEMPERATURE: 99.3 F | OXYGEN SATURATION: 94 % | SYSTOLIC BLOOD PRESSURE: 132 MMHG | HEART RATE: 74 BPM | DIASTOLIC BLOOD PRESSURE: 68 MMHG | WEIGHT: 196 LBS | BODY MASS INDEX: 29.7 KG/M2 | HEIGHT: 68 IN

## 2021-04-22 DIAGNOSIS — R06.00 DYSPNEA ON EXERTION: Primary | ICD-10-CM

## 2021-04-22 DIAGNOSIS — I27.20 PULMONARY HYPERTENSION (HCC): Chronic | ICD-10-CM

## 2021-04-22 DIAGNOSIS — G47.33 OSA (OBSTRUCTIVE SLEEP APNEA): Chronic | ICD-10-CM

## 2021-04-22 PROBLEM — T83.511A URINARY TRACT INFECTION ASSOCIATED WITH CATHETERIZATION OF URINARY TRACT (HCC): Status: RESOLVED | Noted: 2021-02-05 | Resolved: 2021-04-22

## 2021-04-22 PROBLEM — N39.0 URINARY TRACT INFECTION ASSOCIATED WITH CATHETERIZATION OF URINARY TRACT (HCC): Status: RESOLVED | Noted: 2021-02-05 | Resolved: 2021-04-22

## 2021-04-22 PROCEDURE — 99214 OFFICE O/P EST MOD 30 MIN: CPT | Performed by: INTERNAL MEDICINE

## 2021-04-22 NOTE — ASSESSMENT & PLAN NOTE
·  Recently had significant increase in dyspnea which was attributable to the development of hemolytic anemia   · On steroids per Dr Callum Yeung and has follow-up in a short interval   Patient does have CLL with significantly high white count on steroids and most recent hemoglobin stable  · Has gained weight on steroids and we discussed the impact that this would have on his shortness of breath as well    Encouraged diet and exercise

## 2021-04-22 NOTE — ASSESSMENT & PLAN NOTE
·  Weight may impact severity of sleep apnea, still not treated and may also be contributing to pulmonary hypertension

## 2021-04-22 NOTE — PROGRESS NOTES
Progress note - Pulmonary Medicine   Richard Dalton 78 y o  male MRN: [id number]       Impression & Plan:     Dyspnea on exertion  ·  Recently had significant increase in dyspnea which was attributable to the development of hemolytic anemia   · On steroids per Dr Machado and has follow-up in a short interval   Patient does have CLL with significantly high white count on steroids and most recent hemoglobin stable  · Has gained weight on steroids and we discussed the impact that this would have on his shortness of breath as well  Encouraged diet and exercise    DIXIE not currently treated  ·  Weight may impact severity of sleep apnea, still not treated and may also be contributing to pulmonary hypertension    Pulmonary hypertension (HCC)  ·  Last echocardiogram does continue to estimate elevated pulmonary pressures  Does not have signs or symptoms of cor pulmonale however  · Estimated pulmonary pressure 75 mmHg January 2021  · Continue observational approach at this time     he does have a follow-up appointment with me in May  He will keep this appointment   ______________________________________________________________________    HPI:    Richard Dalton presents today for follow-up of   Shortness of breath  Had COVID infection  Post hospital discharge to develop severe shortness of breath and was rehospitalized with notation of the development of hemolytic anemia  He did have EGD and had steroids initiate his hemoglobin is better and he is feeling better  He still has a significantly elevated white count consistent with his known CLL  He will be following up with Dr Machado in the near future  Otherwise he does admit to some weight gain on prednisone  It makes him want to eat consistently  He does drink beer as well which contributes some additional calories  He denies any new symptoms otherwise  No chest pain or palpitations  No leg swelling or signs or symptoms of cor pulmonale    No abdominal pain or GERD symptoms  No increasing arthralgias or myalgias  Current Medications:    Current Outpatient Medications:     amLODIPine (NORVASC) 5 mg tablet, TAKE ONE TABLET BY MOUTH EVERY EVENING, Disp: 90 tablet, Rfl: 3    aspirin 81 MG tablet, Take 1 tablet by mouth daily, Disp: , Rfl:     atorvastatin (LIPITOR) 20 mg tablet, TAKE ONE TABLET BY MOUTH ONCE EVERY DAY, Disp: , Rfl:     melatonin 3 mg, Take 1 tablet (3 mg total) by mouth daily at bedtime as needed (insomnia), Disp: 30 tablet, Rfl: 0    metoprolol tartrate (LOPRESSOR) 25 mg tablet, Take 1 tablet (25 mg total) by mouth every 12 (twelve) hours, Disp: 180 tablet, Rfl: 3    Multiple Vitamin (multivitamin) tablet, Take 1 tablet by mouth daily, Disp: 90 tablet, Rfl: 3    Omega-3 Fatty Acids (FISH OIL) 1,000 mg, Take by mouth daily, Disp: , Rfl:     omeprazole (PriLOSEC) 40 MG capsule, TAKE ONE CAPSULE BY MOUTH ONCE EVERY DAY, Disp: 90 capsule, Rfl: 3    predniSONE 20 mg tablet, Take 3 tablets (60 mg total) by mouth daily, Disp: 90 tablet, Rfl: 0    senna-docusate sodium (SENOKOT S) 8 6-50 mg per tablet, Take 1 tablet by mouth daily at bedtime, Disp: 30 tablet, Rfl: 0    sertraline (ZOLOFT) 50 mg tablet, TAKE ONE TABLET BY MOUTH EVERY DAY, Disp: 90 tablet, Rfl: 3    tamsulosin (FLOMAX) 0 4 mg, Take 1 capsule (0 4 mg total) by mouth daily with dinner CORRECTED SCRIPT! Note change in SIG and QUANTITY  Please process accordingly    Thank you! (9/1/20), Disp: 90 capsule, Rfl: 1    vitamin B-12 (CYANOCOBALAMIN) 500 MCG TABS, Take 1 tablet (500 mcg total) by mouth daily, Disp: 30 tablet, Rfl: 0    folic acid (FOLVITE) 288 mcg tablet, Take 1 tablet (400 mcg total) by mouth daily (Patient not taking: Reported on 4/16/2021), Disp: 30 tablet, Rfl: 0    Review of Systems:  Aside from what is mentioned in the HPI, the review of systems is otherwise negative    Past medical history, surgical history, and family history were reviewed and updated as appropriate    Social history updates:  Social History     Tobacco Use   Smoking Status Former Smoker    Packs/day: 1 00    Years: 15 00    Pack years: 15 00    Types: Cigarettes    Start date:     Quit date: 1980    Years since quittin 6   Smokeless Tobacco Never Used       PhysicalExamination:  Vitals:   /68   Pulse 74   Temp 99 3 °F (37 4 °C)   Resp 18   Ht 5' 8" (1 727 m)   Wt 88 9 kg (196 lb)   SpO2 94%   BMI 29 80 kg/m²     Gen:  Obese, comfortable on room air  No conversational dyspnea  Mild pallor  HEENT:   Conjugate gaze  No scleral icterus  Oropharynx exam deferred due to face mask and COVID 19  Neck: Stout  I do not appreciate any JVD or lymphadenopathy  Trachea is midline  No thyromegaly  Chest: Breath sounds are distant but clear to auscultation  There are no wheezes, rales or rhonchi  Cardiac: Regular rate and rhythm  There are no murmurs, rubs or gallops  Abdomen: Obese  Soft and nontender  Extremities: No clubbing, cyanosis or edema  Neurologic: No focal deficits  Skin: No appreciable rashes  Diagnostic Data:  Labs: I personally reviewed the most recent laboratory data pertinent to today's visit    Lab Results   Component Value Date     37 (HH) 2021    HGB 8 9 (L) 2021    HCT 29 8 (L) 2021     (H) 2021     2021     Lab Results   Component Value Date    SODIUM 135 (L) 2021    K 4 1 2021    CO2 29 2021     2021    BUN 17 2021    CREATININE 0 79 2021    CALCIUM 8 6 2021      hematologic studies identifying his CLL and hemolytic anemia were reviewed      Imaging:  I personally reviewed the images on the [facility] system pertinent to today's visit    2021 chest x-ray shows clear lung fields    Other studies:   2021 echocardiogram does show severe pulmonary hypertension with an estimated pulmonary artery pressure of 75 mmHg    Jammie Benoit MD

## 2021-04-22 NOTE — ASSESSMENT & PLAN NOTE
·  Last echocardiogram does continue to estimate elevated pulmonary pressures  Does not have signs or symptoms of cor pulmonale however      · Estimated pulmonary pressure 75 mmHg January 2021  · Continue observational approach at this time

## 2021-04-23 ENCOUNTER — OFFICE VISIT (OUTPATIENT)
Dept: HEMATOLOGY ONCOLOGY | Facility: CLINIC | Age: 71
End: 2021-04-23
Payer: MEDICARE

## 2021-04-23 VITALS
WEIGHT: 196 LBS | OXYGEN SATURATION: 96 % | SYSTOLIC BLOOD PRESSURE: 142 MMHG | TEMPERATURE: 99.2 F | DIASTOLIC BLOOD PRESSURE: 78 MMHG | HEART RATE: 67 BPM | RESPIRATION RATE: 18 BRPM | HEIGHT: 68 IN | BODY MASS INDEX: 29.7 KG/M2

## 2021-04-23 DIAGNOSIS — C91.10 CLL (CHRONIC LYMPHOCYTIC LEUKEMIA) (HCC): Primary | ICD-10-CM

## 2021-04-23 DIAGNOSIS — D59.10 AUTOIMMUNE HEMOLYTIC ANEMIA (HCC): ICD-10-CM

## 2021-04-23 PROCEDURE — 99215 OFFICE O/P EST HI 40 MIN: CPT | Performed by: INTERNAL MEDICINE

## 2021-04-23 NOTE — PROGRESS NOTES
Hematology / Oncology Outpatient Follow Up Note    David Knox 78 y o  male GLEN: [facility] [place]         Date:  2021    Assessment / Plan: Shaan Disla old gentleman with chronic lymphocytic leukemia diagnosed in 2010  He has lymphocytosis with no lymphadenopathy  He is currently on watchful waiting  He also has iron deficiency anemia, probably due to the gastric vascular ectasia  He is on venofer maintenance therapy  In 2021, he had relatively acute onset of severe microcytic anemia with hemoglobin 4 6  Narciso test was positive  Reticulocyte count was elevated, consistent with autoimmune hemolytic anemia associated with CLL  He has been on prednisone 60 mg with improvement of hemoglobin  I recommended him to continue with prednisone 60 mg daily  I am going to monitor his CBC every week for next 4 weeks  If he has maximum response, he may start taper prednisone  I will see him again in a month for routine follow-up  If his anemia incompletely resolved, ibrutinib may be considered  He is in agreement with my recommendations                                    Subjective:      HPI:             Interval History:  A 80 year old gentleman with chronic lymphocytic leukemia diagnosed in 2010  He has been on watchful waiting because he has absolutely no symptoms from hematology standpoint  Over the last few years, he had slowly progressive microcytic anemia  He was found to have iron deficiency   He was previously treated with IV iron with improvement of anemia   His last colonoscopy was in 2018 which was negative   In early , he developed iron deficiency anemia again  Faustino Yarbrough, he underwent weekly venofer x5, followed by maintenance Venofer every 6 months   He previously underwent EGD which showed several gastric polyp as well as vascular ectasia  In early 2021, he was hospitalized with severe anemia with macrocytosis    He had Narciso positive anemia and elevated reticulocyte count, consistent with hemolytic anemia  He was placed on prednisone 60 mg daily  He presents today for follow-up  His hemoglobin has been improving  He feels better  He has no exertional shortness of breath  He has no bleeding symptoms  His performance status is normal                                   Objective:      Primary Diagnosis:     1  Chronic lymphocytic leukemia  Diagnosed in March 2010  CD-5, CD-23 positive  Kappa light chain positive  CD-38 negative  ZAP-70 positive disease   Diagnosed in 2010     2  Iron deficiency anemia  3    Autoimmune hemolytic anemia, diagnosed in April 2021      Cancer Staging:  No matching staging information was found for the patient         Previous Hematologic/ Oncologic Treatment:            Current Hematologic/ Oncologic Treatment:        prednisone 60 mg daily      Venofer once a year      Disease Status:      No evidence of progressive disease      Test Results:     Pathology:           Radiology:           Laboratory:     See below      Physical Exam:        General Appearance:    Alert, oriented          Eyes:    PERRL   Ears:    Normal external ear canals, both ears   Nose:   Nares normal, septum midline   Throat:   Mucosa moist  Pharynx without injection  Neck:   Supple         Lungs:     Clear to auscultation bilaterally   Chest Wall:    No tenderness or deformity    Heart:    Regular rate and rhythm         Abdomen:     Soft, non-tender, bowel sounds +, no organomegaly               Extremities:   Extremities no cyanosis or edema         Skin:   no rash or icterus  Lymph nodes:   Cervical, supraclavicular, and axillary nodes normal   Neurologic:   CNII-XII intact, normal strength, sensation and reflexes     Throughout           ROS: Review of Systems   All other systems reviewed and are negative            Imaging: Ct Abdomen Pelvis Wo Contrast    Result Date: 4/9/2021  Narrative: CT ABDOMEN AND PELVIS WITHOUT IV CONTRAST INDICATION:   Abdominal distension abdominal distension  COMPARISON:  None  TECHNIQUE:  CT examination of the abdomen and pelvis was performed without intravenous contrast   Axial, sagittal, and coronal 2D reformatted images were created from the source data and submitted for interpretation  Radiation dose length product (DLP) for this visit:  1010 8 mGy-cm   This examination, like all CT scans performed in the [facility], was performed utilizing techniques to minimize radiation dose exposure, including the use of iterative  reconstruction and automated exposure control  Enteric contrast was administered  FINDINGS: ABDOMEN LOWER CHEST:  Trace bilateral pleural effusions  LIVER/BILIARY TREE:  Unremarkable  GALLBLADDER:  No calcified gallstones  No pericholecystic inflammatory change  SPLEEN:  Spleen is enlarged measuring 17 2 x 14 5 x 9 1 cm  PANCREAS:  Unremarkable  ADRENAL GLANDS:  Unremarkable  KIDNEYS/URETERS:  Unremarkable  No hydronephrosis  STOMACH AND BOWEL:  There is colonic diverticulosis without evidence of acute diverticulitis  APPENDIX:  No findings to suggest appendicitis  ABDOMINOPELVIC CAVITY:  No ascites  No pneumoperitoneum  No lymphadenopathy  VESSELS:  Unremarkable for patient's age  PELVIS REPRODUCTIVE ORGANS:  Unremarkable for patient's age  URINARY BLADDER:  The urinary bladder is distended with diffuse wall thickening  Several fairly large diverticuli are also noted  This appearance is unchanged from the prior study  Correlate clinically for cystitis  ABDOMINAL WALL/INGUINAL REGIONS:  There is an umbilical hernia containing fat similar in appearance to the prior study  OSSEOUS STRUCTURES:  No acute fracture or destructive osseous lesion  Impression: Diffuse urinary bladder wall thickening without surrounding inflammatory change  Correlate clinically for cystitis  Several bladder diverticula are seen as on the prior study  Splenomegaly   Colonic diverticulosis without evidence for acute diverticulitis  Workstation performed: [id number]     Xr Chest 1 View Portable    Result Date: 4/9/2021  Narrative: CHEST INDICATION:   dyspnea  COMPARISON:  2/22/2021 EXAM PERFORMED/VIEWS:  XR CHEST PORTABLE Single view FINDINGS: Cardiomediastinal silhouette remains mildly enlarged The lungs are clear  No pneumothorax or pleural effusion  Osseous structures appear within normal limits for patient age  Chronic posttraumatic right chest wall deformity with old rib fractures     Impression: No acute cardiopulmonary disease  Stable Workstation performed: [id number]         Labs:   Lab Results   Component Value Date     37 (HH) 04/21/2021    HGB 8 9 (L) 04/21/2021    HCT 29 8 (L) 04/21/2021     (H) 04/21/2021     04/21/2021     Lab Results   Component Value Date     (L) 07/20/2015    K 4 1 04/21/2021     04/21/2021    CO2 29 04/21/2021    ANIONGAP 9 07/20/2015    BUN 17 04/21/2021    CREATININE 0 79 04/21/2021    GLUCOSE 130 01/10/2021    GLUF 104 (H) 03/11/2021    CALCIUM 8 6 04/21/2021    CORRECTEDCA 9 0 01/21/2021    AST 9 04/21/2021    ALT 30 04/21/2021    ALKPHOS 77 04/21/2021    PROT 8 0 07/20/2015    BILITOT 0 47 07/20/2015    EGFR 92 04/21/2021         Lab Results   Component Value Date     (H) 04/09/2021       No results found for: SPEP, UPEP    Lab Results   Component Value Date    PSA 0 4 01/27/2021    PSA 0 2 01/30/2020    PSA 0 2 02/18/2019         Lab Results   Component Value Date    IRON 107 04/09/2021    TIBC 324 04/09/2021    FERRITIN 484 (H) 04/09/2021       Lab Results   Component Value Date    COHDOBFW37 763 01/16/2021       Lab Results   Component Value Date    FOLATE 11 8 01/16/2021         Current Medications: Reviewed  Allergies: Reviewed  PMH/FH/SH:  Reviewed      Vital Sign:    Body surface area is 2 03 meters squared      Wt Readings from Last 3 Encounters:   04/23/21 88 9 kg (196 lb)   04/22/21 88 9 kg (196 lb)   04/16/21 90 1 kg (198 lb 9 6 oz)        Temp Readings from Last 3 Encounters:   04/23/21 99 2 °F (37 3 °C) (Tympanic Core)   04/22/21 99 3 °F (37 4 °C)   04/16/21 (!) 97 4 °F (36 3 °C) (Skin)        BP Readings from Last 3 Encounters:   04/23/21 142/78   04/22/21 132/68   04/16/21 138/78         Pulse Readings from Last 3 Encounters:   04/23/21 67   04/22/21 74   04/16/21 73     @LASTSAO2(3)@

## 2021-04-29 ENCOUNTER — APPOINTMENT (OUTPATIENT)
Dept: LAB | Facility: MEDICAL CENTER | Age: 71
End: 2021-04-29
Payer: MEDICARE

## 2021-04-29 ENCOUNTER — TELEPHONE (OUTPATIENT)
Dept: HEMATOLOGY ONCOLOGY | Facility: CLINIC | Age: 71
End: 2021-04-29

## 2021-04-29 LAB
BASOPHILS # BLD MANUAL: 0 THOUSAND/UL (ref 0–0.1)
BASOPHILS NFR MAR MANUAL: 0 % (ref 0–1)
EOSINOPHIL # BLD MANUAL: 0 THOUSAND/UL (ref 0–0.4)
EOSINOPHIL NFR BLD MANUAL: 0 % (ref 0–6)
ERYTHROCYTE [DISTWIDTH] IN BLOOD BY AUTOMATED COUNT: 22.6 % (ref 11.6–15.1)
HCT VFR BLD AUTO: 32.2 % (ref 36.5–49.3)
HGB BLD-MCNC: 10 G/DL (ref 12–17)
LYMPHOCYTES # BLD AUTO: 85 % (ref 14–44)
LYMPHOCYTES # BLD AUTO: 98.94 THOUSAND/UL (ref 0.6–4.47)
MACROCYTES BLD QL AUTO: PRESENT
MCH RBC QN AUTO: 37.3 PG (ref 26.8–34.3)
MCHC RBC AUTO-ENTMCNC: 31.1 G/DL (ref 31.4–37.4)
MCV RBC AUTO: 120 FL (ref 82–98)
MONOCYTES # BLD AUTO: 0 THOUSAND/UL (ref 0–1.22)
MONOCYTES NFR BLD: 0 % (ref 4–12)
NEUTROPHILS # BLD MANUAL: 17.46 THOUSAND/UL (ref 1.85–7.62)
NEUTS BAND NFR BLD MANUAL: 3 % (ref 0–8)
NEUTS SEG NFR BLD AUTO: 12 % (ref 43–75)
NRBC BLD AUTO-RTO: 0 /100 WBCS
PLATELET # BLD AUTO: 240 THOUSANDS/UL (ref 149–390)
PLATELET BLD QL SMEAR: ADEQUATE
PMV BLD AUTO: 9.4 FL (ref 8.9–12.7)
RBC # BLD AUTO: 2.68 MILLION/UL (ref 3.88–5.62)
RBC MORPH BLD: PRESENT
STOMATOCYTES BLD QL SMEAR: PRESENT
TOTAL CELLS COUNTED SPEC: 100
WBC # BLD AUTO: 116.4 THOUSAND/UL (ref 4.31–10.16)

## 2021-04-29 PROCEDURE — 36415 COLL VENOUS BLD VENIPUNCTURE: CPT | Performed by: INTERNAL MEDICINE

## 2021-04-29 PROCEDURE — 85007 BL SMEAR W/DIFF WBC COUNT: CPT | Performed by: INTERNAL MEDICINE

## 2021-04-29 PROCEDURE — 85027 COMPLETE CBC AUTOMATED: CPT | Performed by: INTERNAL MEDICINE

## 2021-04-29 NOTE — TELEPHONE ENCOUNTER
Critical Results   Call Received From [facility]    Lab Department Location  [place]    Lab Study  40   Date Blood Work was Done 4/296   Read Back of Information Done Yes    Relevant Information

## 2021-04-29 NOTE — TELEPHONE ENCOUNTER
Hg 10    Per 4/23/OV note:   Reticulocyte count was elevated, consistent with autoimmune hemolytic anemia associated with CLL  He has been on prednisone 60 mg with improvement of hemoglobin  I recommended him to continue with prednisone 60 mg daily  I am going to monitor his CBC every week for next 4 weeks  If he has maximum response, he may start taper prednisone  I will see him again in a month for routine follow-up  If his anemia incompletely resolved, ibrutinib may be considered  He is in agreement with my recommendations        Will send critical WBC to Dr Nevin Munoz RN

## 2021-04-29 NOTE — TELEPHONE ENCOUNTER
Patient calling to see if there is an alternative to the steroid he is on due to symptoms he is having  I advised that this medication was prescribed by another provider  He insisted that it was Dr Stephan Murphy or that he is at least aware the patient is on the steroid  He also would like to talk to someone regarding covid vaccine scheduled 05/07/2021 he is very anxious   He can be reached at [phone]

## 2021-04-29 NOTE — TELEPHONE ENCOUNTER
Returned call to patient and relayed message from Dr Stephan Murphy regarding covid vaccine and prednisone  Patient verbalizes understanding  Is at lab currently for weekly CBC ordered by Dr Carrie Turk to Dr Anna Salazar RN

## 2021-04-29 NOTE — TELEPHONE ENCOUNTER
Patient is concerned that prednisone will interfere with covid vaccine  Will send concerns to Dr Moishe Cowden RN for his input  Patient aware of plan

## 2021-05-04 ENCOUNTER — TELEPHONE (OUTPATIENT)
Dept: HEMATOLOGY ONCOLOGY | Facility: CLINIC | Age: 71
End: 2021-05-04

## 2021-05-04 DIAGNOSIS — D64.9 ACUTE ANEMIA: ICD-10-CM

## 2021-05-04 RX ORDER — PREDNISONE 20 MG/1
60 TABLET ORAL DAILY
Qty: 90 TABLET | Refills: 0 | Status: SHIPPED | OUTPATIENT
Start: 2021-05-04 | End: 2021-06-03

## 2021-05-04 NOTE — TELEPHONE ENCOUNTER
Pended script to Dr Nolen Solders to sign for prednisone  Patient gets weekly CBC to adjust prednisone dose

## 2021-05-04 NOTE — TELEPHONE ENCOUNTER
Medication Refill     Who is Calling  Patient    Medication predniSONE 20 mg tablet        How many pills left  0   Preferred Pharmacy / Address [facility]   Call back number [phone]   Relevant Information

## 2021-05-06 ENCOUNTER — TELEPHONE (OUTPATIENT)
Dept: HEMATOLOGY ONCOLOGY | Facility: CLINIC | Age: 71
End: 2021-05-06

## 2021-05-06 ENCOUNTER — APPOINTMENT (OUTPATIENT)
Dept: LAB | Facility: MEDICAL CENTER | Age: 71
End: 2021-05-06
Payer: MEDICARE

## 2021-05-06 DIAGNOSIS — F32.A DEPRESSION, UNSPECIFIED DEPRESSION TYPE: ICD-10-CM

## 2021-05-06 NOTE — TELEPHONE ENCOUNTER
Lab calling with critical result   Critical WBC = 73 24    Will send to RN to review with Dr Fernando Mini

## 2021-05-07 ENCOUNTER — IMMUNIZATIONS (OUTPATIENT)
Dept: FAMILY MEDICINE CLINIC | Facility: HOSPITAL | Age: 71
End: 2021-05-07

## 2021-05-07 ENCOUNTER — TELEPHONE (OUTPATIENT)
Dept: FAMILY MEDICINE CLINIC | Facility: CLINIC | Age: 71
End: 2021-05-07

## 2021-05-07 DIAGNOSIS — Z23 ENCOUNTER FOR IMMUNIZATION: Primary | ICD-10-CM

## 2021-05-07 PROCEDURE — 91301 SARS-COV-2 / COVID-19 MRNA VACCINE (MODERNA) 100 MCG: CPT

## 2021-05-07 PROCEDURE — 0012A SARS-COV-2 / COVID-19 MRNA VACCINE (MODERNA) 100 MCG: CPT

## 2021-05-13 ENCOUNTER — TELEPHONE (OUTPATIENT)
Dept: HEMATOLOGY ONCOLOGY | Facility: CLINIC | Age: 71
End: 2021-05-13

## 2021-05-13 ENCOUNTER — APPOINTMENT (OUTPATIENT)
Dept: LAB | Facility: HOSPITAL | Age: 71
End: 2021-05-13
Attending: INTERNAL MEDICINE
Payer: MEDICARE

## 2021-05-18 ENCOUNTER — APPOINTMENT (OUTPATIENT)
Dept: LAB | Age: 71
End: 2021-05-18
Payer: MEDICARE

## 2021-05-18 ENCOUNTER — HOSPITAL ENCOUNTER (OUTPATIENT)
Dept: ULTRASOUND IMAGING | Facility: HOSPITAL | Age: 71
Discharge: HOME | End: 2021-05-18
Payer: MEDICARE

## 2021-05-18 ENCOUNTER — TELEPHONE (OUTPATIENT)
Dept: FAMILY MEDICINE CLINIC | Facility: CLINIC | Age: 71
End: 2021-05-18

## 2021-05-18 ENCOUNTER — OFFICE VISIT (OUTPATIENT)
Dept: FAMILY MEDICINE CLINIC | Facility: CLINIC | Age: 71
End: 2021-05-18
Payer: MEDICARE

## 2021-05-18 VITALS
RESPIRATION RATE: 22 BRPM | SYSTOLIC BLOOD PRESSURE: 142 MMHG | BODY MASS INDEX: 31.22 KG/M2 | DIASTOLIC BLOOD PRESSURE: 52 MMHG | HEART RATE: 70 BPM | OXYGEN SATURATION: 96 % | WEIGHT: 206 LBS | HEIGHT: 68 IN

## 2021-05-18 DIAGNOSIS — N50.811 PAIN IN RIGHT TESTICLE: ICD-10-CM

## 2021-05-18 DIAGNOSIS — N50.811 PAIN IN RIGHT TESTICLE: Primary | ICD-10-CM

## 2021-05-18 DIAGNOSIS — D58.9 HEREDITARY HEMOLYTIC ANEMIA (HCC): ICD-10-CM

## 2021-05-18 LAB
BACTERIA UR QL AUTO: ABNORMAL /HPF
BILIRUB UR QL STRIP: NEGATIVE
CLARITY UR: CLEAR
COLOR UR: YELLOW
GLUCOSE UR STRIP-MCNC: NEGATIVE MG/DL
HGB UR QL STRIP.AUTO: ABNORMAL
HYALINE CASTS #/AREA URNS LPF: ABNORMAL /LPF
KETONES UR STRIP-MCNC: NEGATIVE MG/DL
LEUKOCYTE ESTERASE UR QL STRIP: ABNORMAL
NITRITE UR QL STRIP: POSITIVE
NON-SQ EPI CELLS URNS QL MICRO: ABNORMAL /HPF
PH UR STRIP.AUTO: 6.5 [PH]
PROT UR STRIP-MCNC: ABNORMAL MG/DL
RBC #/AREA URNS AUTO: ABNORMAL /HPF
SP GR UR STRIP.AUTO: 1.02 (ref 1–1.03)
UROBILINOGEN UR QL STRIP.AUTO: 1 E.U./DL
WBC #/AREA URNS AUTO: ABNORMAL /HPF

## 2021-05-18 PROCEDURE — 87086 URINE CULTURE/COLONY COUNT: CPT | Performed by: FAMILY MEDICINE

## 2021-05-18 PROCEDURE — 99214 OFFICE O/P EST MOD 30 MIN: CPT | Performed by: FAMILY MEDICINE

## 2021-05-18 PROCEDURE — 87077 CULTURE AEROBIC IDENTIFY: CPT | Performed by: FAMILY MEDICINE

## 2021-05-18 PROCEDURE — 76870 US EXAM SCROTUM: CPT

## 2021-05-18 PROCEDURE — 87186 SC STD MICRODIL/AGAR DIL: CPT | Performed by: FAMILY MEDICINE

## 2021-05-18 PROCEDURE — 81001 URINALYSIS AUTO W/SCOPE: CPT | Performed by: FAMILY MEDICINE

## 2021-05-18 RX ORDER — LEVOFLOXACIN 500 MG/1
TABLET, FILM COATED ORAL
Qty: 10 TABLET | Refills: 0 | Status: SHIPPED | OUTPATIENT
Start: 2021-05-18 | End: 2021-05-28

## 2021-05-18 NOTE — PATIENT INSTRUCTIONS
STAT ultrasound testicles today    Go for urine test at the lab    Patient to call for results if he/she does not hear from us    Cool compresses/elevation of scrotal sac    Further treatment including pain management pending results of above

## 2021-05-18 NOTE — PROGRESS NOTES
FAMILY PRACTICE OFFICE VISIT    NAME: Macarena Willams    AGE: 78 y o  SEX: male  : 1950   MRN: [id number]    DATE: 2021  TIME: 10:00 AM    Assessment and Plan   1  Pain in right testicle  Send for STAT US today  Cannot r/o acute epididymitis/epididymoorchitis (heavy lifting could pre-dispose as well as underlying bacterial/viral infection or tumor)   There is a palpable mass attached to the right testicle which pt states is new  Cannot r/o acute testicular torsion  Less likely - hernia  (but pt with h/o b/l inguinal hernia repairs years ago)  No overlying skin changes in scrotum other than mild erythema - so very unlikely to be early elizabeth's ; no fever  Pt with known BPH - requiring self cath bid - may be creating outflow obstruction    Send for urine today as well  Note -pt has known prolonged QTc interval     Once dx known - can decide upon pain control  Pt took 2 xtra strength tylenol this am and not much relief  Pt with urologist - so may need to consult with him once results known    Cool compresses, elevation of scrotal sac  Will defer CBC testing today - as pt has known CLL  Going for routine labs in a couple of days  AVS:  STAT ultrasound testicles today    Go for urine test at the lab    Patient to call for results if he/she does not hear from us    Cool compresses/elevation of scrotal sac    Further treatment including pain management pending results of above  Chief Complaint     Chief Complaint   Patient presents with    Testicle Pain    Groin Swelling       History of Present Illness   Swati Bruce is a 78y o -year-old male who presents today for acute concern    Pt noticed swelling in testicle 4 days ago (right)    A few days prior to that pt was helping friend move a washer   Noticed some swelling after the move but no pain  Now the pain is increasing particularly starting last night    Here with wife    No h/o testicular problems in past  H/o b/l inguinal hernias 1982 - no problems since    No acute changes in bowels  Pt gets some radiation of pain into back as well        Review of Systems   Review of Systems   Constitutional: Positive for unexpected weight change  Negative for fever  Pt gained weight  Has been taking prednisone   Gastrointestinal: Positive for abdominal pain  Negative for constipation, diarrhea, nausea and vomiting  Genitourinary: Positive for scrotal swelling and testicular pain  Negative for decreased urine volume, discharge, dysuria, flank pain, hematuria, penile pain, penile swelling and urgency  No h/o renal calculi or STI's    Pt self-caths bid - h/o turps X 2  Sees uro      Urine is clear       Active Problem List     Patient Active Problem List   Diagnosis    Essential hypertension    CLL (chronic lymphocytic leukemia) (HCC)    Allergic rhinitis due to pollen    Erectile dysfunction of non-organic origin    Esophageal reflux    Pulmonary hypertension ([address] )    Spinal stenosis of lumbar region    Enlarged prostate without lower urinary tract symptoms (luts)    Iron deficiency anemia    Insomnia    Abnormal EKG    DDD (degenerative disc disease), cervical    DIXIE not currently treated    Hypercholesterolemia    Mild depression ([address] )    History of colon polyps    Diverticulitis    Splenomegaly    BPH with obstruction/lower urinary tract symptoms    Hyponatremia    Hyperglycemia    COVID-19    QT prolongation    Sinus los-tachy syndrome (HCC)    Acute on chronic anemia    Transaminitis    NSVT (nonsustained ventricular tachycardia) (HCC)    Bradycardia    Dyspnea on exertion    GAVE (gastric antral vascular ectasia)    Hemolytic anemia (HCC)    History of right hip replacement    Autoimmune hemolytic anemia (HCC)         Past Medical History:  Past Medical History:   Diagnosis Date    Anxiety     BPH (benign prostatic hypertrophy)     Cancer (HCC)     CLL    CLL (chronic lymphocytic leukemia) ([facility] )     2009    Colon polyp     Concussion     Resolved: 08/22/16    Depression     Gastrointestinal hemorrhage     Last assessed: 08/27/13    GERD (gastroesophageal reflux disease)     Hearing loss of aging     Hiatal hernia     History of transfusion     Hyperlipidemia     Hypertension     Iron deficiency anemia     Microscopic hematuria     Last assessed: 06/28/13    OA (osteoarthritis)     right hip    Pneumothorax     Primary osteoarthritis of right hip 9/29/2017    He is status post right hip arthroplasty    Prostatitis     Pulmonary hypertension ([place] [address] )     Seasonal allergies     Urinary tract infection associated with catheterization of urinary tract ([place] [address] ) 2/5/2021    Pseudomonal UTI asymptomatic  Per Urology do not treat at this time      UTI (urinary tract infection)     in past    Wears glasses        Past Surgical History:  Past Surgical History:   Procedure Laterality Date    COLONOSCOPY      CYSTOSCOPY  10/09/2014    Diagnostic    CYSTOSCOPY  06/29/2020    FRACTURE SURGERY      left lower arm    FRACTURE SURGERY      left femur    HERNIA REPAIR      JOINT REPLACEMENT Right     hip    OTHER SURGICAL HISTORY  03/2011    Spinal anesthesia epidural    NE TOTAL HIP ARTHROPLASTY Right 9/29/2017    Procedure: ARTHROPLASTY HIP TOTAL ANTERIOR;  Surgeon: Raffy Evans MD;  Location: [facility];  Service: Orthopedics    TONSILLECTOMY AND ADENOIDECTOMY      TRANSURETHRAL RESECTION OF PROSTATE      x 2    WRIST SURGERY         Family History:  Family History   Problem Relation Age of Onset    No Known Problems Mother     Heart attack Father     Diabetes Brother     Prostate cancer Brother        Social History:  Social History     Socioeconomic History    Marital status: /Civil Union     Spouse name: Not on file    Number of children: Not on file    Years of education: Not on file    Highest education level: Not on file   Occupational History    Not on file Social Needs    Financial resource strain: Not on file    Food insecurity     Worry: Not on file     Inability: Not on file    Transportation needs     Medical: Not on file     Non-medical: Not on file   Tobacco Use    Smoking status: Former Smoker     Packs/day: 1 00     Years: 15 00     Pack years: 15      Types: Cigarettes     Start date:      Quit date: 1980     Years since quittin 7    Smokeless tobacco: Never Used   Substance and Sexual Activity    Alcohol use: Yes     Alcohol/week: 10 0 standard drinks     Types: 10 Cans of beer per week     Frequency: 4 or more times a week     Drinks per session: 3 or 4    Drug use: Not Currently     Types: Marijuana    Sexual activity: Not Currently   Lifestyle    Physical activity     Days per week: Not on file     Minutes per session: Not on file    Stress: Not on file   Relationships    Social connections     Talks on phone: Not on file     Gets together: Not on file     Attends [employer] service: Not on file     Active member of club or organization: Not on file     Attends meetings of clubs or organizations: Not on file     Relationship status: Not on file    Intimate partner violence     Fear of current or ex partner: Not on file     Emotionally abused: Not on file     Physically abused: Not on file     Forced sexual activity: Not on file   Other Topics Concern    Not on file   Social History Narrative    Not on file       Objective     Vitals:    21 0945   BP: 142/52   Pulse: 70   Resp: 22   SpO2: 96%     Wt Readings from Last 3 Encounters:   21 93 4 kg (206 lb)   21 88 9 kg (196 lb)   21 88 9 kg (196 lb)       Physical Exam  Vitals signs and nursing note reviewed  Constitutional:       General: He is not in acute distress  Appearance: Normal appearance  He is not ill-appearing or toxic-appearing  Comments: Nontoxic appearing   Cardiovascular:      Rate and Rhythm: Normal rate and regular rhythm  Pulses: Normal pulses  Pulmonary:      Effort: Pulmonary effort is normal  No respiratory distress  Breath sounds: Normal breath sounds  No stridor  No wheezing, rhonchi or rales  Abdominal:      General: Abdomen is flat  There is no distension  Palpations: Abdomen is soft  There is no mass  Tenderness: There is no abdominal tenderness  There is no guarding  Hernia: No hernia is present  Comments: No palpable inguinal hernia with coughing  Genitourinary:     Comments: Tenderness along right testicle but not along the epididymis  Not relieved with elevation of scrotal sac/testicles  Slight reddness  Left testicle appears slightly enlarged as well  No inguinal adenopathy  Penis is not entirely visible upon length due to swelling in testicles  No penile discharge  Musculoskeletal:      Right lower leg: No edema  Left lower leg: No edema  Skin:     General: Skin is warm  Findings: No rash  Neurological:      Mental Status: He is alert  Psychiatric:         Mood and Affect: Mood normal          Behavior: Behavior normal          Thought Content:  Thought content normal          Judgment: Judgment normal          Pertinent Laboratory/Diagnostic Studies:  Lab Results   Component Value Date    GLUCOSE 130 01/10/2021    BUN 17 04/21/2021    CREATININE 0 79 04/21/2021    CALCIUM 8 6 04/21/2021     (L) 07/20/2015    K 4 1 04/21/2021    CO2 29 04/21/2021     04/21/2021     Lab Results   Component Value Date    ALT 30 04/21/2021    AST 9 04/21/2021    ALKPHOS 77 04/21/2021    BILITOT 0 47 07/20/2015       Lab Results   Component Value Date    WBC 65 83 (HH) 05/13/2021    HGB 10 7 (L) 05/13/2021    HCT 34 2 (L) 05/13/2021     (H) 05/13/2021     05/13/2021       No results found for: TSH    Lab Results   Component Value Date    CHOL 144 01/20/2015     Lab Results   Component Value Date    TRIG 117 01/16/2021     Lab Results   Component Value Date    HDL 50 12/31/2019     Lab Results   Component Value Date    LDLCALC 63 12/31/2019     Lab Results   Component Value Date    HGBA1C 5 1 03/11/2021       Results for orders placed or performed in visit on 05/07/21   CBC and differential   Result Value Ref Range    WBC 65 83 (HH) 4 31 - 10 16 Thousand/uL    RBC 2 88 (L) 3 88 - 5 62 Million/uL    Hemoglobin 10 7 (L) 12 0 - 17 0 g/dL    Hematocrit 34 2 (L) 36 5 - 49 3 %     (H) 82 - 98 fL    MCH 37 2 (H) 26 8 - 34 3 pg    MCHC 31 3 (L) 31 4 - 37 4 g/dL    RDW 17 2 (H) 11 6 - 15 1 %    MPV 8 9 8 9 - 12 7 fL    Platelets 852 017 - 683 Thousands/uL    nRBC 0 /100 WBCs   Manual Differential(PHLEBS Do Not Order)   Result Value Ref Range    Segmented % 1 (L) 43 - 75 %    Lymphocytes % 97 (H) 14 - 44 %    Monocytes % 2 (L) 4 - 12 %    Eosinophils, % 0 0 - 6 %    Basophils % 0 0 - 1 %    Absolute Neutrophils 0 66 (L) 1 85 - 7 62 Thousand/uL    Lymphocytes Absolute 63 86 (H) 0 60 - 4 47 Thousand/uL    Monocytes Absolute 1 32 (H) 0 00 - 1 22 Thousand/uL    Eosinophils Absolute 0 00 0 00 - 0 40 Thousand/uL    Basophils Absolute 0 00 0 00 - 0 10 Thousand/uL    Total Counted 100     Smudge Cells Present     Anisocytosis Present     Macrocytes Present     Polychromasia Present     Platelet Estimate Adequate Adequate       No orders of the defined types were placed in this encounter        ALLERGIES:  Allergies   Allergen Reactions    Codeine Other (See Comments)     agitated    Sulfamethoxazole-Trimethoprim GI Intolerance and Abdominal Pain     upset stomach       Current Medications     Current Outpatient Medications   Medication Sig Dispense Refill    amLODIPine (NORVASC) 5 mg tablet TAKE ONE TABLET BY MOUTH EVERY EVENING 90 tablet 3    aspirin 81 MG tablet Take 1 tablet by mouth daily      atorvastatin (LIPITOR) 20 mg tablet TAKE ONE TABLET BY MOUTH ONCE EVERY DAY      folic acid (FOLVITE) 037 mcg tablet Take 1 tablet (400 mcg total) by mouth daily 30 tablet 0    metoprolol tartrate (LOPRESSOR) 25 mg tablet Take 1 tablet (25 mg total) by mouth every 12 (twelve) hours 180 tablet 3    Multiple Vitamin (multivitamin) tablet Take 1 tablet by mouth daily 90 tablet 3    Omega-3 Fatty Acids (FISH OIL) 1,000 mg Take by mouth daily      omeprazole (PriLOSEC) 40 MG capsule TAKE ONE CAPSULE BY MOUTH ONCE EVERY DAY 90 capsule 3    predniSONE 20 mg tablet Take 3 tablets (60 mg total) by mouth daily 90 tablet 0    senna-docusate sodium (SENOKOT S) 8 6-50 mg per tablet Take 1 tablet by mouth daily at bedtime 30 tablet 0    sertraline (ZOLOFT) 50 mg tablet TAKE ONE TABLET BY MOUTH EVERY DAY 90 tablet 3    tamsulosin (FLOMAX) 0 4 mg Take 1 capsule (0 4 mg total) by mouth daily with dinner CORRECTED SCRIPT! Note change in SIG and QUANTITY  Please process accordingly  Thank you! (9/1/20) 90 capsule 1    vitamin B-12 (CYANOCOBALAMIN) 500 MCG TABS Take 1 tablet (500 mcg total) by mouth daily 30 tablet 0     No current facility-administered medications for this visit            Health Maintenance     Health Maintenance   Topic Date Due    Medicare Annual Wellness Visit (AWV)  03/06/2021    Fall Risk  02/05/2022    Depression Remission PHQ  02/05/2022    BMI: Followup Plan  03/17/2022    BMI: Adult  05/18/2022    DTaP,Tdap,and Td Vaccines (2 - Td) 08/22/2026    Colorectal Cancer Screening  10/23/2028    Hepatitis C Screening  Completed    Pneumococcal Vaccine: 65+ Years  Completed    Influenza Vaccine  Completed    COVID-19 Vaccine  Completed    HIB Vaccine  Aged Out    Hepatitis B Vaccine  Aged Out    IPV Vaccine  Aged Out    Hepatitis A Vaccine  Aged Out    Meningococcal ACWY Vaccine  Aged Out    HPV Vaccine  Aged Out     Immunization History   Administered Date(s) Administered    INFLUENZA 09/21/2015    Influenza Quadrivalent Preservative Free 3 years and older IM 10/14/2016, 09/15/2017    Influenza Split Preservative Free ID 09/10/2013    Influenza, high dose seasonal 0 7 mL 10/01/2018, 10/15/2019, 09/18/2020    Influenza, seasonal, injectable 08/31/2012, 09/23/2014    Pneumococcal Conjugate 13-Valent 10/14/2016    Pneumococcal Polysaccharide PPV23 09/10/2013, 08/30/2019    SARS-CoV-2 / COVID-19 mRNA IM (Moderna) 04/06/2021, 05/07/2021    Tdap 08/22/2016    Zoster 04/29/2015, 08/20/2018    Zoster Vaccine Recombinant 08/20/2018, 10/19/2018          Bertrand Sauceda DO    Addendum - rec'd results of scrotal US -   Sonographic evidence of right epididymoorchitis      Large, complex right hydrocele  No specific findings of scrotal abscess  Consistent with epididymoorchitis  And large complex right hydrocoele  Will begin treatment with antibiotic   levaquin 500 mg po qd for 10 days  Note - pt with prolongation of QTc so checked with samira - clinic pharmacist to make sure levaquin  Is appropriate    Will also have pt f/u with urology whom he is already established with  Bertrand Sauceda 5/18/2021 at [phone]

## 2021-05-18 NOTE — LETTER
[address] [place] [address]  [address] [facility] [address]      May 20, 2021    MRN: [id number]     Phone: [phone]     Dear Mr Mariaelena Gómez recently had a(n) Ultrasound performed on 5/18/2021 at  [address] [place] [address] that was requested by Brody Melo DO  The study was reviewed by a radiologist, which is a physician who specializes in medical imaging  The radiologist issued a report describing his or her findings  In that report there was a finding that the radiologist felt warranted further discussion with your health care provider and that discussion would be beneficial to you  The results were sent to Brody Melo DO on 5/18/2021  We recommend that you contact Brody Melo DO at [phone] or set up an appointment to discuss the results of the imaging test  If you have already heard from Brody Melo DO regarding the results of your study, you can disregard this letter  This letter is not meant to alarm you, but intended to encourage you to follow-up on your results with the provider that sent you for the imaging study  In addition, we have enclosed answers to frequently asked questions by other patients who have also received a letter to review results with their health care provider (see page two)  Thank you for choosing [address] [place] [address] for your medical imaging needs  FREQUENTLY ASKED QUESTIONS    1  Why am I receiving this letter? [facility] requires us to notify patients who have findings on imaging exams that may require more testing or follow-up with a health professional within the next 3 months  2  How serious is the finding on the imaging test?  This letter is sent to all patients who may need follow-up or more testing within the next 3 months  Receiving this letter does not necessarily mean you have a life-threatening imaging finding or disease  Recommendations in the radiologists imaging report are general in nature and it is up to your healthcare provider to say whether those recommendations make sense for your situation  You are strongly encouraged to talk to your health care provider about the results and ask whether additional steps need to be taken  3  Where can I get a copy of the final report for my recent radiology exam?  To get a full copy of the report you can access your records online at [email or web address] or please contact [address] [place] [address] Department at [phone] Monday through Friday between 8 am and 6 pm          4  What do I need to do now? Please contact your health care provider who requested the imaging study to discuss what further actions (if any) are needed  You may have already heard from (your ordering provider) in regard to this test in which case you can disregard this letter  NOTICE IN ACCORDANCE WITH THE [facility] PATIENT TEST RESULT INFORMATION ACT OF 2018    You are receiving this notice as a result of a determination by your diagnostic imaging service that further discussions of your test results are warranted and would be beneficial to you  The complete results of your test or tests have been or will be sent to the health care practitioner that ordered the test or tests  It is recommended that you contact your health care practitioner to discuss your results as soon as possible

## 2021-05-18 NOTE — TELEPHONE ENCOUNTER
I personally called pt at 1303 - with results of ultrasound  Discussed that he should take antibiotic daily for 10 days and avoid taking within 2 hours of taking calcium or other supplements  Note - renal function normal  And most recent QTc normal  He should also use tylenol for pain control, cool compressess and scrotal elevation  He collected his urine and will bring to the lab  And I asked him to f/u with urology  I discussed that the antibiotic can increase risk for tendon rupture while on prednisone but at this time the benefits outweigh the risks    He is aware and in agreement with plan

## 2021-05-19 ENCOUNTER — OFFICE VISIT (OUTPATIENT)
Dept: FAMILY MEDICINE CLINIC | Facility: CLINIC | Age: 71
End: 2021-05-19
Payer: MEDICARE

## 2021-05-19 ENCOUNTER — TELEPHONE (OUTPATIENT)
Dept: OTHER | Facility: OTHER | Age: 71
End: 2021-05-19

## 2021-05-19 ENCOUNTER — APPOINTMENT (OUTPATIENT)
Dept: LAB | Facility: MEDICAL CENTER | Age: 71
End: 2021-05-19
Payer: MEDICARE

## 2021-05-19 VITALS
BODY MASS INDEX: 30.46 KG/M2 | OXYGEN SATURATION: 97 % | WEIGHT: 201 LBS | HEIGHT: 68 IN | DIASTOLIC BLOOD PRESSURE: 70 MMHG | TEMPERATURE: 97.8 F | HEART RATE: 94 BPM | SYSTOLIC BLOOD PRESSURE: 120 MMHG

## 2021-05-19 DIAGNOSIS — R73.9 HYPERGLYCEMIA: ICD-10-CM

## 2021-05-19 DIAGNOSIS — D59.10 AUTOIMMUNE HEMOLYTIC ANEMIA (HCC): ICD-10-CM

## 2021-05-19 DIAGNOSIS — N43.0 ENCYSTED HYDROCELE: ICD-10-CM

## 2021-05-19 DIAGNOSIS — D50.9 IRON DEFICIENCY ANEMIA, UNSPECIFIED IRON DEFICIENCY ANEMIA TYPE: Chronic | ICD-10-CM

## 2021-05-19 DIAGNOSIS — N40.1 BPH WITH OBSTRUCTION/LOWER URINARY TRACT SYMPTOMS: Chronic | ICD-10-CM

## 2021-05-19 DIAGNOSIS — K31.819 GAVE (GASTRIC ANTRAL VASCULAR ECTASIA): ICD-10-CM

## 2021-05-19 DIAGNOSIS — Z00.00 MEDICARE ANNUAL WELLNESS VISIT, SUBSEQUENT: Primary | ICD-10-CM

## 2021-05-19 DIAGNOSIS — G47.33 OSA (OBSTRUCTIVE SLEEP APNEA): Chronic | ICD-10-CM

## 2021-05-19 DIAGNOSIS — C91.10 CLL (CHRONIC LYMPHOCYTIC LEUKEMIA) (HCC): Chronic | ICD-10-CM

## 2021-05-19 DIAGNOSIS — N45.1 EPIDIDYMITIS: ICD-10-CM

## 2021-05-19 DIAGNOSIS — D59.19 OTHER AUTOIMMUNE HEMOLYTIC ANEMIA (HCC): ICD-10-CM

## 2021-05-19 DIAGNOSIS — N13.8 BPH WITH OBSTRUCTION/LOWER URINARY TRACT SYMPTOMS: Chronic | ICD-10-CM

## 2021-05-19 DIAGNOSIS — I10 ESSENTIAL HYPERTENSION: Chronic | ICD-10-CM

## 2021-05-19 DIAGNOSIS — K21.9 GASTROESOPHAGEAL REFLUX DISEASE WITHOUT ESOPHAGITIS: Chronic | ICD-10-CM

## 2021-05-19 DIAGNOSIS — E87.1 HYPONATREMIA: ICD-10-CM

## 2021-05-19 DIAGNOSIS — F32.A MILD DEPRESSION: Chronic | ICD-10-CM

## 2021-05-19 DIAGNOSIS — I27.20 PULMONARY HYPERTENSION (HCC): Chronic | ICD-10-CM

## 2021-05-19 PROCEDURE — 99214 OFFICE O/P EST MOD 30 MIN: CPT | Performed by: FAMILY MEDICINE

## 2021-05-19 PROCEDURE — G0438 PPPS, INITIAL VISIT: HCPCS | Performed by: FAMILY MEDICINE

## 2021-05-19 NOTE — PROGRESS NOTES
Assessment/Plan:       Problem List Items Addressed This Visit        Digestive    Esophageal reflux (Chronic)     Continue on PPI therapy  GAVE (gastric antral vascular ectasia)     He is now following with GI  He had a recent EGD  CBC was improved recently  Respiratory    DIXIE not currently treated (Chronic)     He declines treatment at this time  Cardiovascular and Mediastinum    Essential hypertension (Chronic)     Blood pressure remains stable off multiple medications that were stopped during his [facility] admission  Continue close monitoring as he has gained some weight and remains on high-dose prednisone  Pulmonary hypertension (HCC) (Chronic)     He has no significant shortness of breath at this time  He declines CPAP treatment which could certainly help with his pulmonary hypertension  Genitourinary    BPH with obstruction/lower urinary tract symptoms (Chronic)     Continue close Urology follow-up         Epididymitis     He has taken 2 doses of Levaquin  Fortunately, he is feeling much better already  He is concerned about side effects of Levaquin so we decided to stop it today  He will monitor closely for recurrence of significant symptoms  Urine culture is negative, but I explained that that does not rule out epididymitis  Encysted hydrocele     He has a large right-sided hydrocele  At this time, it is minimally tender  He does follow with urology intermittently  He can keep his regular appointment in August             Other    CLL (chronic lymphocytic leukemia) ([facility] [address] ) (Chronic)     Continue close follow-up with Oncology  Recent blood work was reviewed and looks stable  Iron deficiency anemia (Chronic)    Mild depression (HCC) (Chronic)     He seems to be stable at this time  He feels that sertraline has been helpful  Hyponatremia     Continue routine monitoring           Hyperglycemia    Relevant Orders    Hemoglobin A1C Hemolytic anemia (HCC)    Autoimmune hemolytic anemia (HCC)     This appears to be stable at this time  He does remain on high-dose prednisone  He is seeing Oncology next week  Other Visit Diagnoses     Medicare annual wellness visit, subsequent    -  Primary            Subjective:      Patient ID: Sadi Bridges is a 78 y o  male  HPI patient presents today for follow-up for chronic health issues in addition to a Medicare wellness visit  He is doing pretty well  He has gained some weight on prednisone which he finds disconcerting  He does have a history of hypertension which interestingly has stayed on the lower side despite stopping multiple medications during his COVID illness  He denies any current issues with chest pain, shortness of breath, palpitations or lightheadedness  He has some chronic depression which remains stable  His wife has chronic memory loss which she finds very stressful  He has history of BPH and remains on tamsulosin and is voiding well  He was diagnosed with epididymitis a few days ago  He took 2 doses of Levaquin but wants to stop it due to concern for side effects  He is feeling much better  He still is having some right-sided testicular pain  He has a large hydrocele as well      The following portions of the patient's history were reviewed and updated as appropriate: allergies, current medications, past family history, past medical history, past social history, past surgical history and problem list       Current Outpatient Medications:     amLODIPine (NORVASC) 5 mg tablet, TAKE ONE TABLET BY MOUTH EVERY EVENING, Disp: 90 tablet, Rfl: 3    aspirin 81 MG tablet, Take 1 tablet by mouth daily, Disp: , Rfl:     atorvastatin (LIPITOR) 20 mg tablet, TAKE ONE TABLET BY MOUTH ONCE EVERY DAY, Disp: , Rfl:     folic acid (FOLVITE) 356 mcg tablet, Take 1 tablet (400 mcg total) by mouth daily, Disp: 30 tablet, Rfl: 0    levofloxacin (LEVAQUIN) 500 mg tablet, Take 1 tab po daily for 10 days, Disp: 10 tablet, Rfl: 0    metoprolol tartrate (LOPRESSOR) 25 mg tablet, Take 1 tablet (25 mg total) by mouth every 12 (twelve) hours, Disp: 180 tablet, Rfl: 3    Multiple Vitamin (multivitamin) tablet, Take 1 tablet by mouth daily, Disp: 90 tablet, Rfl: 3    Omega-3 Fatty Acids (FISH OIL) 1,000 mg, Take by mouth daily, Disp: , Rfl:     omeprazole (PriLOSEC) 40 MG capsule, TAKE ONE CAPSULE BY MOUTH ONCE EVERY DAY, Disp: 90 capsule, Rfl: 3    predniSONE 20 mg tablet, Take 3 tablets (60 mg total) by mouth daily, Disp: 90 tablet, Rfl: 0    senna-docusate sodium (SENOKOT S) 8 6-50 mg per tablet, Take 1 tablet by mouth daily at bedtime, Disp: 30 tablet, Rfl: 0    sertraline (ZOLOFT) 50 mg tablet, TAKE ONE TABLET BY MOUTH EVERY DAY, Disp: 90 tablet, Rfl: 3    tamsulosin (FLOMAX) 0 4 mg, Take 1 capsule (0 4 mg total) by mouth daily with dinner CORRECTED SCRIPT! Note change in SIG and QUANTITY  Please process accordingly  Thank you! (9/1/20), Disp: 90 capsule, Rfl: 1    vitamin B-12 (CYANOCOBALAMIN) 500 MCG TABS, Take 1 tablet (500 mcg total) by mouth daily, Disp: 30 tablet, Rfl: 0     Review of Systems   Constitutional: Negative for appetite change, chills, fatigue, fever and unexpected weight change  HENT: Negative for trouble swallowing  Eyes: Negative for visual disturbance  Respiratory: Negative for cough, chest tightness, shortness of breath and wheezing  Cardiovascular: Negative for chest pain, palpitations and leg swelling  Gastrointestinal: Negative for abdominal distention, abdominal pain, blood in stool, constipation and diarrhea  Endocrine: Negative for polyuria  Genitourinary: Negative for difficulty urinating and flank pain  Musculoskeletal: Negative for arthralgias and myalgias  Skin: Negative for rash  Neurological: Negative for dizziness and light-headedness  Hematological: Negative for adenopathy  Does not bruise/bleed easily  Psychiatric/Behavioral: Negative for decreased concentration, dysphoric mood and sleep disturbance  The patient is not nervous/anxious  Objective:      /70 (BP Location: Left arm, Patient Position: Sitting, Cuff Size: Standard)   Pulse 94   Temp 97 8 °F (36 6 °C)   Ht 5' 8" (1 727 m)   Wt 91 2 kg (201 lb)   SpO2 97%   BMI 30 56 kg/m²          Physical Exam  Constitutional:       General: He is not in acute distress  Appearance: He is well-developed  He is obese  He is not diaphoretic  HENT:      Head: Normocephalic  Eyes:      General:         Right eye: No discharge  Left eye: No discharge  Pupils: Pupils are equal, round, and reactive to light  Neck:      Thyroid: No thyromegaly  Trachea: No tracheal deviation  Cardiovascular:      Rate and Rhythm: Normal rate and regular rhythm  Heart sounds: Normal heart sounds  No murmur  Pulmonary:      Effort: Pulmonary effort is normal  No respiratory distress  Breath sounds: No wheezing or rales  Abdominal:      General: There is no distension  Palpations: Abdomen is soft  Tenderness: There is no abdominal tenderness  Musculoskeletal: Normal range of motion  Lymphadenopathy:      Cervical: No cervical adenopathy  Skin:     General: Skin is warm  Findings: No erythema  Neurological:      Mental Status: He is alert and oriented to person, place, and time  Cranial Nerves: No cranial nerve deficit  Psychiatric:         Thought Content:  Thought content normal          Judgment: Judgment normal            Sal Trotter MD

## 2021-05-19 NOTE — PROGRESS NOTES
Assessment and Plan:     Problem List Items Addressed This Visit     None        BMI Counseling: Body mass index is 30 56 kg/m²  The BMI is above normal  Nutrition recommendations include encouraging healthy choices of fruits and vegetables  Exercise recommendations include moderate physical activity 150 minutes/week  Presents today for Medicare wellness visit  He is up-to-date on vaccines  He is up-to-date on colon cancer screening  PSA will continue to be monitored  He is following with Urology  He has a living will  He screens negative for depression or cognitive decline  Preventive health issues were discussed with patient, and age appropriate screening tests were ordered as noted in patient's After Visit Summary  Personalized health advice and appropriate referrals for health education or preventive services given if needed, as noted in patient's After Visit Summary       History of Present Illness:     Patient presents for Medicare Annual Wellness visit    Patient Care Team:  Tracy Faustin MD as PCP - MD Luc Killian MD     Problem List:     Patient Active Problem List   Diagnosis    Essential hypertension    [facility]LL [facility]ocytic [facility]) ([facility] [address] )    Allergic rhinitis due to pollen    Erectile dysfunction of non-organic origin    Esophageal reflux    [facility]    Spinal stenosis of lumbar region    Enlarged prostate without lower urinary tract symptoms (luts)    Iron deficiency anemia    Insomnia    Abnormal EKG    DDD (degenerative disc disease), cervical    DIXIE not currently treated    Hypercholesterolemia    Mild depression ([facility] [address] )    History of colon polyps    Diverticulitis    Splenomegaly    BPH with obstruction/lower urinary tract symptoms    Hyponatremia    Hyperglycemia    COVID-19    QT prolongation    [facility]us los-tachy syndrome [facility]    Acute on chronic anemia    Transaminitis    NSVT (nonsustained ventricular tachycardia) (HCC)    Bradycardia    Dyspnea on exertion    GAVE (gastric antral vascular ectasia)    Hemolytic anemia (HCC)    History of right hip replacement    Autoimmune hemolytic anemia (HCC)      Past Medical and Surgical History:     Past Medical History:   Diagnosis Date    Anxiety     BPH (benign prostatic hypertrophy)     Cancer (HCC)     CLL    CLL (chronic lymphocytic leukemia) ([facility] [address] )     2009    Colon polyp     Concussion     Resolved: 08/22/16    Depression     Gastrointestinal hemorrhage     Last assessed: 08/27/13    GERD (gastroesophageal reflux disease)     Hearing loss of aging     Hiatal hernia     History of transfusion     Hyperlipidemia     Hypertension     Iron deficiency anemia     Microscopic hematuria     Last assessed: 06/28/13    OA (osteoarthritis)     right hip    Pneumothorax     Primary osteoarthritis of right hip 9/29/2017    He is status post right hip arthroplasty    Prostatitis     Pulmonary hypertension ([facility] [address] )     Seasonal allergies     [facility] 2/5/2021    Pseudomonal UTI asymptomatic  Per Urology do not treat at this time      UTI (urinary tract infection)     in past    Wears glasses      Past Surgical History:   Procedure Laterality Date    COLONOSCOPY      CYSTOSCOPY  10/09/2014    Diagnostic    CYSTOSCOPY  06/29/2020    FRACTURE SURGERY      left lower arm    FRACTURE SURGERY      left femur    HERNIA REPAIR      JOINT REPLACEMENT Right     hip    OTHER SURGICAL HISTORY  03/2011    Spinal anesthesia epidural    MA TOTAL HIP ARTHROPLASTY Right 9/29/2017    Procedure: ARTHROPLASTY HIP TOTAL ANTERIOR;  Surgeon: Edward Crawford MD;  Location: [facility];  Service: Orthopedics    TONSILLECTOMY AND ADENOIDECTOMY      TRANSURETHRAL RESECTION OF PROSTATE      x 2    WRIST SURGERY        Family History:     Family History   Problem Relation Age of Onset    No Known Problems Mother     Heart attack Father     Diabetes Brother     Prostate cancer Brother       Social History:     E-Cigarette/Vaping    E-Cigarette Use Never User      E-Cigarette/Vaping Substances    Nicotine No     THC No     CBD No     Flavoring No     Other No     Unknown No      Social History     Socioeconomic History    Marital status: /Civil Union     Spouse name: None    Number of children: None    Years of education: None    Highest education level: None   Occupational History    None   Social Needs    Financial resource strain: None    Food insecurity     Worry: None     Inability: None    Transportation needs     Medical: None     Non-medical: None   Tobacco Use    Smoking status: Former Smoker     Packs/day: 1      Years: 15      Pack years: 15 00     Types: Cigarettes     Start date:      Quit date: 1980     Years since quittin 7    Smokeless tobacco: Never Used   Substance and Sexual Activity    Alcohol use:  Yes     Alcohol/week: 10 0 standard drinks     Types: 10 Cans of beer per week     Frequency: 4 or more times a week     Drinks per session: 3 or 4    Drug use: Not Currently     Types: Marijuana    Sexual activity: Not Currently   Lifestyle    Physical activity     Days per week: None     Minutes per session: None    Stress: None   Relationships    Social connections     Talks on phone: None     Gets together: None     Attends Ca[employer] service: None     Active member of club or organization: None     Attends meetings of clubs or organizations: None     Relationship status: None    Intimate partner violence     Fear of current or ex partner: None     Emotionally abused: None     Physically abused: None     Forced sexual activity: None   Other Topics Concern    None   Social History Narrative    None      Medications and Allergies:     Current Outpatient Medications   Medication Sig Dispense Refill    amLODIPine (NORVASC) 5 mg tablet TAKE ONE TABLET BY MOUTH EVERY EVENING 90 tablet 3    aspirin 81 MG tablet Take 1 tablet by mouth daily      atorvastatin (LIPITOR) 20 mg tablet TAKE ONE TABLET BY MOUTH ONCE EVERY DAY      folic acid (FOLVITE) 191 mcg tablet Take 1 tablet (400 mcg total) by mouth daily 30 tablet 0    levofloxacin (LEVAQUIN) 500 mg tablet Take 1 tab po daily for 10 days 10 tablet 0    metoprolol tartrate (LOPRESSOR) 25 mg tablet Take 1 tablet (25 mg total) by mouth every 12 (twelve) hours 180 tablet 3    Multiple Vitamin (multivitamin) tablet Take 1 tablet by mouth daily 90 tablet 3    Omega-3 Fatty Acids (FISH OIL) 1,000 mg Take by mouth daily      omeprazole (PriLOSEC) 40 MG capsule TAKE ONE CAPSULE BY MOUTH ONCE EVERY DAY 90 capsule 3    predniSONE 20 mg tablet Take 3 tablets (60 mg total) by mouth daily 90 tablet 0    senna-docusate sodium (SENOKOT S) 8 6-50 mg per tablet Take 1 tablet by mouth daily at bedtime 30 tablet 0    sertraline (ZOLOFT) 50 mg tablet TAKE ONE TABLET BY MOUTH EVERY DAY 90 tablet 3    tamsulosin (FLOMAX) 0 4 mg Take 1 capsule (0 4 mg total) by mouth daily with dinner CORRECTED SCRIPT! Note change in SIG and QUANTITY  Please process accordingly  Thank you! (9/1/20) 90 capsule 1    vitamin B-12 (CYANOCOBALAMIN) 500 MCG TABS Take 1 tablet (500 mcg total) by mouth daily 30 tablet 0     No current facility-administered medications for this visit        Allergies   Allergen Reactions    Codeine Other (See Comments)     agitated    Sulfamethoxazole-Trimethoprim GI Intolerance and Abdominal Pain     upset stomach      Immunizations:     Immunization History   Administered Date(s) Administered    INFLUENZA 09/21/2015    Influenza Quadrivalent Preservative Free 3 years and older IM 10/14/2016, 09/15/2017    Influenza Split Preservative Free ID 09/10/2013    Influenza, high dose seasonal 0 7 mL 10/01/2018, 10/15/2019, 09/18/2020    Influenza, seasonal, injectable 08/31/2012, 09/23/2014    Pneumococcal Conjugate 13-Valent 10/14/2016    Pneumococcal Polysaccharide PPV23 09/10/2013, 08/30/2019    SARS-CoV-2 / COVID-19 mRNA IM (Moderna) 04/06/2021, 05/07/2021    Tdap 08/22/2016    Zoster 04/29/2015, 08/20/2018    Zoster Vaccine Recombinant 08/20/2018, 10/19/2018      Health Maintenance:         Topic Date Due    Colorectal Cancer Screening  10/23/2028    Hepatitis C Screening  Completed     There are no preventive care reminders to display for this patient  Medicare Health Risk Assessment:     /70 (BP Location: Left arm, Patient Position: Sitting, Cuff Size: Standard)   Pulse 94   Temp 97 8 °F (36 6 °C)   Ht 5' 8" (1 727 m)   Wt 91 2 kg (201 lb)   SpO2 97%   BMI 30 56 kg/m²      Dustin Peralta is here for his Subsequent Wellness visit  Health Risk Assessment:   Patient rates overall health as good  Patient feels that their physical health rating is same  Patient is satisfied with their life  Eyesight was rated as same  Hearing was rated as same  Patient feels that their emotional and mental health rating is same  Patients states they are never, rarely angry  Patient states they are never, rarely unusually tired/fatigued  Pain experienced in the last 7 days has been a lot  Patient's pain rating has been 3/10  Patient states that he has experienced no weight loss or gain in last 6 months  Depression Screening:   PHQ-2 Score: 0  PHQ-9 Score: 4      Fall Risk Screening: In the past year, patient has experienced: no history of falling in past year      Home Safety:  Patient does not have trouble with stairs inside or outside of their home  Patient has working smoke alarms and has no working carbon monoxide detector  Home safety hazards include: none  Nutrition:   Current diet is Regular  Medications:   Patient is currently taking over-the-counter supplements  OTC medications include: see medication list  Patient is able to manage medications       Activities of Daily Living (ADLs)/Instrumental Activities of Daily Living (IADLs):   Walk and transfer into and out of bed and chair?: Yes  Dress and groom yourself?: Yes    Bathe or shower yourself?: Yes    Feed yourself? Yes  Do your laundry/housekeeping?: Yes  Manage your money, pay your bills and track your expenses?: Yes  Make your own meals?: Yes    Do your own shopping?: Yes    Previous Hospitalizations:   Any hospitalizations or ED visits within the last 12 months?: Yes      Advance Care Planning:   Living will: Yes    Durable POA for healthcare: Yes    Advanced directive: Yes    End of Life Decisions reviewed with patient: Yes      Cognitive Screening:   Provider or family/friend/caregiver concerned regarding cognition?: No    PREVENTIVE SCREENINGS      Cardiovascular Screening:    General: Screening Not Indicated and History Lipid Disorder      Diabetes Screening:     General: Screening Current      Colorectal Cancer Screening:     General: Screening Current      Prostate Cancer Screening:    General: Screening Current      Osteoporosis Screening:    General: Patient Declines      Abdominal Aortic Aneurysm (AAA) Screening:    Risk factors include: age between 73-67 yo and tobacco use        Lung Cancer Screening:     General: Screening Not Indicated      Hepatitis C Screening:    General: Screening Current    Screening, Brief Intervention, and Referral to Treatment (SBIRT)    Screening  Typical number of drinks in a day: 0  Typical number of drinks in a week: 5  Interpretation: Low risk drinking behavior      Single Item Drug Screening:  How often have you used an illegal drug (including marijuana) or a prescription medication for non-medical reasons in the past year? never    Single Item Drug Screen Score: 0  Interpretation: Negative screen for possible drug use disorder      Imelda Gonsalves MD

## 2021-05-19 NOTE — TELEPHONE ENCOUNTER
Lab Result: Critical Lab results - WBC - 75 98   Date/Time Drawn: 05/19/2021 at [facility]   Ordering Provider:  Tripp Camacho   Lab Personnel's Name: Irving Perez       The following critical/stat result was read back to the lab as stated above and [employer] to the on-call provider

## 2021-05-20 ENCOUNTER — TELEPHONE (OUTPATIENT)
Dept: HEMATOLOGY ONCOLOGY | Facility: CLINIC | Age: 71
End: 2021-05-20

## 2021-05-20 LAB — BACTERIA UR CULT: ABNORMAL

## 2021-05-20 NOTE — TELEPHONE ENCOUNTER
----- Message from Maryann Chavez MD sent at 5/20/2021  7:53 AM EDT -----  Hb>11  Can you let him cut back prednisone 40mg po daily

## 2021-05-20 NOTE — RESULT ENCOUNTER NOTE
Will inform pt thru the portal that his urine did in fact grow out bacteria which is susceptible to the levaquin which pt is taking  Any questions, please feel free to call!

## 2021-05-24 ENCOUNTER — OFFICE VISIT (OUTPATIENT)
Dept: HEMATOLOGY ONCOLOGY | Facility: CLINIC | Age: 71
End: 2021-05-24
Payer: MEDICARE

## 2021-05-24 VITALS
OXYGEN SATURATION: 95 % | HEIGHT: 68 IN | WEIGHT: 207 LBS | BODY MASS INDEX: 31.37 KG/M2 | HEART RATE: 88 BPM | TEMPERATURE: 99 F | RESPIRATION RATE: 18 BRPM | DIASTOLIC BLOOD PRESSURE: 80 MMHG | SYSTOLIC BLOOD PRESSURE: 138 MMHG

## 2021-05-24 DIAGNOSIS — C91.10 CLL (CHRONIC LYMPHOCYTIC LEUKEMIA) (HCC): Primary | ICD-10-CM

## 2021-05-24 PROCEDURE — 99214 OFFICE O/P EST MOD 30 MIN: CPT | Performed by: INTERNAL MEDICINE

## 2021-05-24 NOTE — PROGRESS NOTES
Hematology / Oncology Outpatient Follow Up Note    Ashish Slater 78 y o  male  BGD:[id number]         Date:  2021    Assessment / Plan: Morteza Davison old gentleman with chronic lymphocytic leukemia diagnosed in 2010  He has lymphocytosis with no lymphadenopathy  He is currently on watchful waiting   He also has iron deficiency anemia, probably due to the gastric vascular ectasia   He is on venofer maintenance therapy  In 2021, he  Was diagnosed with autoimmune hemolytic anemia for which he is on prednisone 60 mg daily  He has significant improvement of anemia with prednisone  Therefore, I recommended him to start tapering prednisone  Over the next 2 weeks, he takes 40 mg daily followed by 20 mg for 2 weeks  Subsequently, he takes 10 mg daily for 2 weeks followed by 10 mg every other day for 2 weeks before he discontinued prednisone  He is in agreement with my recommendation  We will continue to monitor his CBC every other week  I will see him again in mid 2021 with ferritin at which time he is going to have venofer infusion with history of iron deficiency                                   Subjective:      HPI:             Interval History:  A 80 year old gentleman with chronic lymphocytic leukemia diagnosed in 2010  He has been on watchful waiting because he has absolutely no symptoms from hematology standpoint  Over the last few years, he had slowly progressive microcytic anemia  He was found to have iron deficiency   He was previously treated with IV iron with improvement of anemia   His last colonoscopy was in 2018 which was negative   In early , he developed iron deficiency anemia again  Mayda Carter, he underwent weekly venofer x5, followed by maintenance Venofer every 6 months   He previously underwent EGD which showed several gastric polyp as well as vascular ectasia  In early 2021, he was hospitalized with severe anemia with macrocytosis    He had Narciso positive anemia and elevated reticulocyte count, consistent with autoimmune hemolytic anemia  He was placed on prednisone 60 mg daily  He has significant improvement of anemia  His most recent hemoglobin was 11 3  He has stable lymphocytosis  He feels much better  His exercise tolerance has much improved  He has no exertional shortness of breath  He is afebrile  He has mild insomnia  His performance status is normal                                  Objective:      Primary Diagnosis:     1  Chronic lymphocytic leukemia  Diagnosed in March 2010  CD-5, CD-23 positive  Kappa light chain positive  CD-38 negative  ZAP-70 positive disease   Diagnosed in 2010     2  Iron deficiency anemia      3  Autoimmune hemolytic anemia, diagnosed in April 2021      Cancer Staging:  No matching staging information was found for the patient         Previous Hematologic/ Oncologic Treatment:            Current Hematologic/ Oncologic Treatment:        prednisone   Taper to be started today      Venofer once a year      Disease Status:      NA     Test Results:     Pathology:           Radiology:           Laboratory:     See below      Physical Exam:        General Appearance:    Alert, oriented          Eyes:    PERRL   Ears:    Normal external ear canals, both ears   Nose:   Nares normal, septum midline   Throat:   Mucosa moist  Pharynx without injection  Neck:   Supple         Lungs:     Clear to auscultation bilaterally   Chest Wall:    No tenderness or deformity    Heart:    Regular rate and rhythm         Abdomen:     Soft, non-tender, bowel sounds +, no organomegaly               Extremities:   Extremities no cyanosis or edema         Skin:   no rash or icterus      Lymph nodes:   Cervical, supraclavicular, and axillary nodes normal   Neurologic:   CNII-XII intact, normal strength, sensation and reflexes     Throughout           ROS: Review of Systems   All other systems reviewed and are negative  Imaging: Us Scrotum And Testicles    Result Date: 5/18/2021  Narrative: SCROTAL ULTRASOUND INDICATION:    N50 [id number]: Right testicular pain  Per physician progress notes: Questioned acute epididymitis /epididymoorchitis  Palpable mass right scrotum /testicle  Question torsion  Known BPH, self catheterizes  Per sonographer, right testicular pain for 5 days with redness and swelling  COMPARISON: None TECHNIQUE:   Ultrasound the scrotal contents was performed with a high frequency linear transducer utilizing volumetric sweep imaging as well as standard still image techniques  Imaging performed in longitudinal and transverse orientation  Color and spectral Doppler evaluation also performed bilaterally  FINDINGS: TESTES: Testes are symmetric and normal in size  RIGHT testis = 3 9 x 2 6 x 3 0 cm (length X depth X width) (length X depth X width) Normal contour with homogeneous smooth echotexture  No intratesticular mass lesion or calcifications  LEFT testis = 3 6 x 2 0 x 2 5 cm (length X depth X width) Normal contour with homogeneous smooth echotexture  No intratesticular mass lesion or calcifications  Arterial and venous flow demonstrated in both testes  Asymmetric diffuse hypervascularity right testicle and epididymis compared to the left  EPIDIDYMIDES: Hypervascularity on the right mentioned above  Also asymmetric increased in size and decreased echogenicity right epididymis compared to last  Simple epididymal cysts, largest 1 4 cm, on the left are considered benign findings  HYDROCELE:  Large, complex right hydrocele, with septations, but no masses or internal vascularity  Smaller left hydrocele with mild floating debris  VARICOCELE:  Not demonstrated  SCROTUM:  Scrotal thickness and appearance within normal limits  No evidence for extratesticular mass or hernia demonstrated  The study was marked in [facility] for immediate notification       Impression:  Sonographic evidence of right epididymoorchitis  Large, complex right hydrocele  No specific findings of scrotal abscess  Other nonemergent findings above  Workstation performed: [id number]         Labs:   Lab Results   Component Value Date    WBC 75 98 (HH) 05/19/2021    HGB 11 3 (L) 05/19/2021    HCT 35 6 (L) 05/19/2021     (H) 05/19/2021     05/19/2021     Lab Results   Component Value Date     (L) 07/20/2015    K 4 1 04/21/2021     04/21/2021    CO2 29 04/21/2021    ANIONGAP 9 07/20/2015    BUN 17 04/21/2021    CREATININE 0 79 04/21/2021    GLUCOSE 130 01/10/2021    GLUF 104 (H) 03/11/2021    CALCIUM 8 6 04/21/2021    CORRECTEDCA 9 0 01/21/2021    AST 9 04/21/2021    ALT 30 04/21/2021    ALKPHOS 77 04/21/2021    PROT 8 0 07/20/2015    BILITOT 0 47 07/20/2015    EGFR 92 04/21/2021           Lab Results   Component Value Date     (H) 04/09/2021           Lab Results   Component Value Date    PSA 0 4 01/27/2021    PSA 0 2 01/30/2020    PSA 0 2 02/18/2019         Lab Results   Component Value Date    IRON 107 04/09/2021    TIBC 324 04/09/2021    FERRITIN 484 (H) 04/09/2021       Lab Results   Component Value Date    BEMZSSEV17 676 01/16/2021       Lab Results   Component Value Date    FOLATE 11 8 01/16/2021         Current Medications: Reviewed  Allergies: Reviewed  PMH/FH/SH:  Reviewed      Vital Sign:    Body surface area is 2 07 meters squared      Wt Readings from Last 3 Encounters:   05/24/21 93 9 kg (207 lb)   05/19/21 91 2 kg (201 lb)   05/18/21 93 4 kg (206 lb)        Temp Readings from Last 3 Encounters:   05/24/21 99 °F (37 2 °C) (Tympanic Core)   05/19/21 97 8 °F (36 6 °C)   04/23/21 99 2 °F (37 3 °C) (Tympanic Core)        BP Readings from Last 3 Encounters:   05/24/21 138/80   05/19/21 120/70   05/18/21 142/52         Pulse Readings from Last 3 Encounters:   05/24/21 88   05/19/21 94   05/18/21 70     @LASTSAO2(3)@

## 2021-05-28 ENCOUNTER — OFFICE VISIT (OUTPATIENT)
Dept: PULMONOLOGY | Facility: CLINIC | Age: 71
End: 2021-05-28
Payer: MEDICARE

## 2021-05-28 VITALS
OXYGEN SATURATION: 97 % | WEIGHT: 205.2 LBS | DIASTOLIC BLOOD PRESSURE: 80 MMHG | HEART RATE: 68 BPM | HEIGHT: 68 IN | SYSTOLIC BLOOD PRESSURE: 144 MMHG | BODY MASS INDEX: 31.1 KG/M2 | TEMPERATURE: 99.3 F

## 2021-05-28 DIAGNOSIS — R06.00 DYSPNEA ON EXERTION: Primary | ICD-10-CM

## 2021-05-28 DIAGNOSIS — I27.20 PULMONARY HYPERTENSION (HCC): Chronic | ICD-10-CM

## 2021-05-28 DIAGNOSIS — G47.33 OSA (OBSTRUCTIVE SLEEP APNEA): Chronic | ICD-10-CM

## 2021-05-28 PROCEDURE — 99214 OFFICE O/P EST MOD 30 MIN: CPT | Performed by: INTERNAL MEDICINE

## 2021-05-28 NOTE — PROGRESS NOTES
Progress note - Pulmonary Medicine   Swati Bruce 78 y o  male MRN: [id number]       Impression & Plan:     Dyspnea on exertion  ·  Currently well controlled and was tied closely to his degree of anemia  · Currently with a hemoglobin of 11   · Followed by Dr Nilsa Harrison for CLL and hemolytic anemia  Remains on steroids  Currently on tapering dose  · Has pulmonary hypertension but has never been particularly symptomatic from a pulmonary hypertension standpoint    DIXIE not currently treated  ·  Has not been on therapy for sleep apnea -  Has declined  · Denies daytime somnolence or other symptoms  Continue to monitor for symptomatology    Pulmonary hypertension (HCC)  ·  Last echocardiogram with significant pulmonary hypertension estimated at 75 mmHg   · He does not have any signs or symptoms of right ventricular pressure or volume overload by clinical examination  No peripheral edema  Suspect discordance with echo and pulmonary pressure is likely overestimated   · At this time would not pursue any additional echocardiogram or right heart catheterization given well controlled symptoms      Will follow up with me in 3-4 months  ______________________________________________________________________    HPI:    Swati Bruce presents today for follow-up of  Dyspnea on exertion, obstructive sleep apnea, and pulmonary hypertension  He remains essentially asymptomatic from a pulmonary hypertension standpoint  His recent increase in dyspnea on exertion was related to hemolytic anemia  He has had improvement in his hemoglobin levels and is on tapering doses of steroids under the direction of Dr Nilsa Harrison  He has CLL as well  No cough or phlegm production  No chest tightness or chest pain  He denies any new respiratory symptoms and shortness of breath is well controlled  He unfortunately has gained substantial amount of weight on steroid therapy  He has a significantly increased appetite    No fever, chills, or sick contacts  He did receive his COVID vaccination after having primary COVID infection  He may be at risk for poor vaccination response due to his relative immunocompromise and CLL    Although he does have obstructive sleep apnea, he denies symptomatology and has never agreed therapy      He does report stress and anxiety  He does use marijuana occasionally to help remedy the anxiety  He is interested in obtaining a medical marijuana card and potentially transitioning from smoking marijuana to  edible therapy as an alternative    Current Medications:    Current Outpatient Medications:     amLODIPine (NORVASC) 5 mg tablet, TAKE ONE TABLET BY MOUTH EVERY EVENING, Disp: 90 tablet, Rfl: 3    aspirin 81 MG tablet, Take 1 tablet by mouth daily, Disp: , Rfl:     atorvastatin (LIPITOR) 20 mg tablet, TAKE ONE TABLET BY MOUTH ONCE EVERY DAY, Disp: , Rfl:     folic acid (FOLVITE) 003 mcg tablet, Take 1 tablet (400 mcg total) by mouth daily, Disp: 30 tablet, Rfl: 0    levofloxacin (LEVAQUIN) 500 mg tablet, Take 1 tab po daily for 10 days, Disp: 10 tablet, Rfl: 0    metoprolol tartrate (LOPRESSOR) 25 mg tablet, Take 1 tablet (25 mg total) by mouth every 12 (twelve) hours, Disp: 180 tablet, Rfl: 3    Multiple Vitamin (multivitamin) tablet, Take 1 tablet by mouth daily, Disp: 90 tablet, Rfl: 3    Omega-3 Fatty Acids (FISH OIL) 1,000 mg, Take by mouth daily, Disp: , Rfl:     omeprazole (PriLOSEC) 40 MG capsule, TAKE ONE CAPSULE BY MOUTH ONCE EVERY DAY, Disp: 90 capsule, Rfl: 3    predniSONE 20 mg tablet, Take 3 tablets (60 mg total) by mouth daily, Disp: 90 tablet, Rfl: 0    senna-docusate sodium (SENOKOT S) 8 6-50 mg per tablet, Take 1 tablet by mouth daily at bedtime, Disp: 30 tablet, Rfl: 0    sertraline (ZOLOFT) 50 mg tablet, TAKE ONE TABLET BY MOUTH EVERY DAY, Disp: 90 tablet, Rfl: 3    tamsulosin (FLOMAX) 0 4 mg, Take 1 capsule (0 4 mg total) by mouth daily with dinner CORRECTED SCRIPT!   Note change in SIG and QUANTITY  Please process accordingly  Thank you! (20), Disp: 90 capsule, Rfl: 1    vitamin B-12 (CYANOCOBALAMIN) 500 MCG TABS, Take 1 tablet (500 mcg total) by mouth daily, Disp: 30 tablet, Rfl: 0    Review of Systems:  Aside from what is mentioned in the HPI, the review of systems is otherwise negative    Past medical history, surgical history, and family history were reviewed and updated as appropriate    Social history updates:  Social History     Tobacco Use   Smoking Status Former Smoker    Packs/day:     Years: 15 00    Pack years: 15 00    Types: Cigarettes    Start date:     Quit date: 1980    Years since quittin 7   Smokeless Tobacco Never Used       PhysicalExamination:  Vitals:   /80   Pulse 68   Temp 99 3 °F (37 4 °C)   Ht 5' 8" (1 727 m)   Wt 93 1 kg (205 lb 3 2 oz)   SpO2 97%   BMI 31 20 kg/m²    Gen:  Overweight  Comfortable  Non-labored  HEENT:  Conjugate gaze  No scleral icterus    O/P: clear  moist  Neck: Trachea is midline  No JVD  No adenopathy  Chest:   Mild thoracic kyphosis  Cardiac:  regular  no murmur  Abdomen: Soft and nontender  Bowel sounds are present  Extremities: No edema    Diagnostic Data:  Labs:   I personally reviewed the most recent laboratory data pertinent to today's visit    Lab Results   Component Value Date    WBC 75 98 (HH) 2021    HGB 11 3 (L) 2021    HCT 35 6 (L) 2021     (H) 2021     2021     Lab Results   Component Value Date    SODIUM 135 (L) 2021    K 4 1 2021    CO2 29 2021     2021    BUN 17 2021    CREATININE 0 79 2021    CALCIUM 8 6 2021       no new pulmonary diagnostic studies or imaging    Karla Salcido MD

## 2021-05-28 NOTE — ASSESSMENT & PLAN NOTE
·  Has not been on therapy for sleep apnea -  Has declined  · Denies daytime somnolence or other symptoms    Continue to monitor for symptomatology

## 2021-05-28 NOTE — ASSESSMENT & PLAN NOTE
·  Currently well controlled and was tied closely to his degree of anemia  · Currently with a hemoglobin of 11   · Followed by Dr Miguelina Alvarez for CLL and hemolytic anemia  Remains on steroids  Currently on tapering dose    · Has pulmonary hypertension but has never been particularly symptomatic from a pulmonary hypertension standpoint

## 2021-05-28 NOTE — ASSESSMENT & PLAN NOTE
·  Last echocardiogram with significant pulmonary hypertension estimated at 75 mmHg   · He does not have any signs or symptoms of right ventricular pressure or volume overload by clinical examination  No peripheral edema    Suspect discordance with echo and pulmonary pressure is likely overestimated   · At this time would not pursue any additional echocardiogram or right heart catheterization given well controlled symptoms

## 2021-06-07 ENCOUNTER — APPOINTMENT (OUTPATIENT)
Dept: LAB | Age: 71
End: 2021-06-07
Payer: MEDICARE

## 2021-06-07 ENCOUNTER — TELEPHONE (OUTPATIENT)
Dept: OTHER | Facility: OTHER | Age: 71
End: 2021-06-07

## 2021-06-07 LAB
ANISOCYTOSIS BLD QL SMEAR: PRESENT
BASOPHILS # BLD MANUAL: 0.37 THOUSAND/UL (ref 0–0.1)
BASOPHILS NFR MAR MANUAL: 1 % (ref 0–1)
EOSINOPHIL # BLD MANUAL: 0.37 THOUSAND/UL (ref 0–0.4)
EOSINOPHIL NFR BLD MANUAL: 1 % (ref 0–6)
ERYTHROCYTE [DISTWIDTH] IN BLOOD BY AUTOMATED COUNT: 14.8 % (ref 11.6–15.1)
HCT VFR BLD AUTO: 34 % (ref 36.5–49.3)
HGB BLD-MCNC: 11.1 G/DL (ref 12–17)
LYMPHOCYTES # BLD AUTO: 32.15 THOUSAND/UL (ref 0.6–4.47)
LYMPHOCYTES # BLD AUTO: 88 % (ref 14–44)
MACROCYTES BLD QL AUTO: PRESENT
MCH RBC QN AUTO: 36 PG (ref 26.8–34.3)
MCHC RBC AUTO-ENTMCNC: 32.6 G/DL (ref 31.4–37.4)
MCV RBC AUTO: 110 FL (ref 82–98)
METAMYELOCYTES NFR BLD MANUAL: 1 % (ref 0–1)
MONOCYTES # BLD AUTO: 0 THOUSAND/UL (ref 0–1.22)
MONOCYTES NFR BLD: 0 % (ref 4–12)
NEUTROPHILS # BLD MANUAL: 2.92 THOUSAND/UL (ref 1.85–7.62)
NEUTS BAND NFR BLD MANUAL: 1 % (ref 0–8)
NEUTS SEG NFR BLD AUTO: 7 % (ref 43–75)
NRBC BLD AUTO-RTO: 0 /100 WBCS
PLATELET # BLD AUTO: 166 THOUSANDS/UL (ref 149–390)
PLATELET BLD QL SMEAR: ADEQUATE
PMV BLD AUTO: 9.2 FL (ref 8.9–12.7)
POLYCHROMASIA BLD QL SMEAR: PRESENT
RBC # BLD AUTO: 3.08 MILLION/UL (ref 3.88–5.62)
SMUDGE CELLS BLD QL SMEAR: PRESENT
TOTAL CELLS COUNTED SPEC: 100
VARIANT LYMPHS # BLD AUTO: 1 %
WBC # BLD AUTO: 36.53 THOUSAND/UL (ref 4.31–10.16)

## 2021-06-07 PROCEDURE — 85007 BL SMEAR W/DIFF WBC COUNT: CPT | Performed by: INTERNAL MEDICINE

## 2021-06-07 PROCEDURE — 85027 COMPLETE CBC AUTOMATED: CPT | Performed by: INTERNAL MEDICINE

## 2021-06-07 PROCEDURE — 36415 COLL VENOUS BLD VENIPUNCTURE: CPT | Performed by: INTERNAL MEDICINE

## 2021-06-07 NOTE — TELEPHONE ENCOUNTER
Lab Result: WBC 36 53   Date/Time Drawn: 06-07-21 @ 0932   Ordering Provider: Dr Whitley Hopson Name: Ronald Bella following critical/stat result was read back to the lab as stated above and [employer] to the on-call provider

## 2021-06-18 ENCOUNTER — TELEPHONE (OUTPATIENT)
Dept: FAMILY MEDICINE CLINIC | Facility: CLINIC | Age: 71
End: 2021-06-18

## 2021-06-18 DIAGNOSIS — J30.1 ALLERGIC RHINITIS DUE TO POLLEN, UNSPECIFIED SEASONALITY: ICD-10-CM

## 2021-06-18 NOTE — TELEPHONE ENCOUNTER
patient called in to the office stating that he called over 2 weeks ago to discuss a personal issue with you over the phone and you never called him back, and he stated he does understand you a very busy man, but would like to discuss a personal issue  I stated to the pt that you were out for the week and that I would send you a message, but I can not grantee he would call you while out of the office and that once you returned someone from the office would call him back  Thank you!

## 2021-06-19 RX ORDER — MONTELUKAST SODIUM 10 MG/1
TABLET ORAL
Qty: 90 TABLET | Refills: 3 | Status: SHIPPED | OUTPATIENT
Start: 2021-06-19 | End: 2022-07-13

## 2021-06-21 ENCOUNTER — APPOINTMENT (OUTPATIENT)
Dept: LAB | Facility: IMAGING CENTER | Age: 71
End: 2021-06-21
Payer: MEDICARE

## 2021-06-22 ENCOUNTER — TELEMEDICINE (OUTPATIENT)
Dept: FAMILY MEDICINE CLINIC | Facility: CLINIC | Age: 71
End: 2021-06-22
Payer: MEDICARE

## 2021-06-22 VITALS
OXYGEN SATURATION: 93 % | TEMPERATURE: 99.3 F | SYSTOLIC BLOOD PRESSURE: 159 MMHG | HEART RATE: 66 BPM | DIASTOLIC BLOOD PRESSURE: 83 MMHG

## 2021-06-22 DIAGNOSIS — R50.9 FEVER, UNSPECIFIED FEVER CAUSE: Primary | ICD-10-CM

## 2021-06-22 PROCEDURE — 99213 OFFICE O/P EST LOW 20 MIN: CPT | Performed by: FAMILY MEDICINE

## 2021-06-24 ENCOUNTER — HOSPITAL ENCOUNTER (INPATIENT)
Facility: HOSPITAL | Age: 71
LOS: 3 days | Discharge: HOME | DRG: 699 | End: 2021-06-28
Attending: EMERGENCY MEDICINE | Admitting: INTERNAL MEDICINE
Payer: MEDICARE

## 2021-06-24 ENCOUNTER — TELEPHONE (OUTPATIENT)
Dept: FAMILY MEDICINE CLINIC | Facility: CLINIC | Age: 71
End: 2021-06-24

## 2021-06-24 ENCOUNTER — APPOINTMENT (EMERGENCY)
Dept: CT IMAGING | Facility: HOSPITAL | Age: 71
DRG: 699 | End: 2021-06-24
Payer: MEDICARE

## 2021-06-24 DIAGNOSIS — T83.511D URINARY TRACT INFECTION ASSOCIATED WITH CATHETERIZATION OF URINARY TRACT, UNSPECIFIED INDWELLING URINARY CATHETER TYPE, SUBSEQUENT ENCOUNTER: ICD-10-CM

## 2021-06-24 DIAGNOSIS — R78.81 BACTEREMIA: ICD-10-CM

## 2021-06-24 DIAGNOSIS — N39.0 URINARY TRACT INFECTION ASSOCIATED WITH CATHETERIZATION OF URINARY TRACT, UNSPECIFIED INDWELLING URINARY CATHETER TYPE, SUBSEQUENT ENCOUNTER: ICD-10-CM

## 2021-06-24 DIAGNOSIS — N30.80 EMPHYSEMATOUS CYSTITIS: Primary | ICD-10-CM

## 2021-06-24 DIAGNOSIS — C91.10 CLL (CHRONIC LYMPHOCYTIC LEUKEMIA) (HCC): ICD-10-CM

## 2021-06-24 DIAGNOSIS — E87.1 HYPONATREMIA: ICD-10-CM

## 2021-06-24 LAB
ALBUMIN SERPL BCP-MCNC: 3.5 G/DL (ref 3.5–5)
ALP SERPL-CCNC: 83 U/L (ref 46–116)
ALT SERPL W P-5'-P-CCNC: 43 U/L (ref 12–78)
ANION GAP SERPL CALCULATED.3IONS-SCNC: 8 MMOL/L (ref 4–13)
AST SERPL W P-5'-P-CCNC: 22 U/L (ref 5–45)
BACTERIA UR QL AUTO: ABNORMAL /HPF
BASOPHILS # BLD MANUAL: 0.32 THOUSAND/UL (ref 0–0.1)
BASOPHILS NFR MAR MANUAL: 1 % (ref 0–1)
BILIRUB SERPL-MCNC: 0.88 MG/DL (ref 0.2–1)
BILIRUB UR QL STRIP: NEGATIVE
BUN SERPL-MCNC: 10 MG/DL (ref 5–25)
CALCIUM SERPL-MCNC: 8.4 MG/DL (ref 8.3–10.1)
CHLORIDE SERPL-SCNC: 92 MMOL/L (ref 100–108)
CLARITY UR: ABNORMAL
CO2 SERPL-SCNC: 29 MMOL/L (ref 21–32)
COLOR UR: YELLOW
CREAT SERPL-MCNC: 0.77 MG/DL (ref 0.6–1.3)
EOSINOPHIL # BLD MANUAL: 0 THOUSAND/UL (ref 0–0.4)
EOSINOPHIL NFR BLD MANUAL: 0 % (ref 0–6)
ERYTHROCYTE [DISTWIDTH] IN BLOOD BY AUTOMATED COUNT: 14.9 % (ref 11.6–15.1)
GFR SERPL CREATININE-BSD FRML MDRD: 93 ML/MIN/1.73SQ M
GLUCOSE SERPL-MCNC: 115 MG/DL (ref 65–140)
GLUCOSE UR STRIP-MCNC: NEGATIVE MG/DL
HCT VFR BLD AUTO: 31.2 % (ref 36.5–49.3)
HGB BLD-MCNC: 10.7 G/DL (ref 12–17)
HGB UR QL STRIP.AUTO: ABNORMAL
KETONES UR STRIP-MCNC: NEGATIVE MG/DL
LACTATE SERPL-SCNC: 0.6 MMOL/L (ref 0.5–2)
LEUKOCYTE ESTERASE UR QL STRIP: ABNORMAL
LYMPHOCYTES # BLD AUTO: 26.48 THOUSAND/UL (ref 0.6–4.47)
LYMPHOCYTES # BLD AUTO: 82 % (ref 14–44)
MACROCYTES BLD QL AUTO: PRESENT
MCH RBC QN AUTO: 34.9 PG (ref 26.8–34.3)
MCHC RBC AUTO-ENTMCNC: 34.3 G/DL (ref 31.4–37.4)
MCV RBC AUTO: 102 FL (ref 82–98)
MONOCYTES # BLD AUTO: 0 THOUSAND/UL (ref 0–1.22)
MONOCYTES NFR BLD: 0 % (ref 4–12)
NEUTROPHILS # BLD MANUAL: 5.49 THOUSAND/UL (ref 1.85–7.62)
NEUTS SEG NFR BLD AUTO: 17 % (ref 43–75)
NITRITE UR QL STRIP: NEGATIVE
NON-SQ EPI CELLS URNS QL MICRO: ABNORMAL /HPF
NRBC BLD AUTO-RTO: 0 /100 WBCS
PH UR STRIP.AUTO: 6 [PH]
PLATELET # BLD AUTO: 160 THOUSANDS/UL (ref 149–390)
PLATELET BLD QL SMEAR: ADEQUATE
PMV BLD AUTO: 9 FL (ref 8.9–12.7)
POTASSIUM SERPL-SCNC: 3.9 MMOL/L (ref 3.5–5.3)
PROT SERPL-MCNC: 7.7 G/DL (ref 6.4–8.2)
PROT UR STRIP-MCNC: ABNORMAL MG/DL
RBC # BLD AUTO: 3.07 MILLION/UL (ref 3.88–5.62)
RBC #/AREA URNS AUTO: ABNORMAL /HPF
SMUDGE CELLS BLD QL SMEAR: PRESENT
SODIUM SERPL-SCNC: 129 MMOL/L (ref 136–145)
SP GR UR STRIP.AUTO: 1.01 (ref 1–1.03)
TOTAL CELLS COUNTED SPEC: 100
UROBILINOGEN UR QL STRIP.AUTO: 0.2 E.U./DL
WBC # BLD AUTO: 32.29 THOUSAND/UL (ref 4.31–10.16)
WBC #/AREA URNS AUTO: ABNORMAL /HPF

## 2021-06-24 PROCEDURE — 87186 SC STD MICRODIL/AGAR DIL: CPT | Performed by: EMERGENCY MEDICINE

## 2021-06-24 PROCEDURE — 81001 URINALYSIS AUTO W/SCOPE: CPT | Performed by: EMERGENCY MEDICINE

## 2021-06-24 PROCEDURE — 99284 EMERGENCY DEPT VISIT MOD MDM: CPT | Performed by: EMERGENCY MEDICINE

## 2021-06-24 PROCEDURE — 74177 CT ABD & PELVIS W/CONTRAST: CPT

## 2021-06-24 PROCEDURE — 1123F ACP DISCUSS/DSCN MKR DOCD: CPT | Performed by: EMERGENCY MEDICINE

## 2021-06-24 PROCEDURE — 87086 URINE CULTURE/COLONY COUNT: CPT | Performed by: EMERGENCY MEDICINE

## 2021-06-24 PROCEDURE — 85027 COMPLETE CBC AUTOMATED: CPT | Performed by: EMERGENCY MEDICINE

## 2021-06-24 PROCEDURE — 36415 COLL VENOUS BLD VENIPUNCTURE: CPT | Performed by: EMERGENCY MEDICINE

## 2021-06-24 PROCEDURE — 85007 BL SMEAR W/DIFF WBC COUNT: CPT | Performed by: EMERGENCY MEDICINE

## 2021-06-24 PROCEDURE — 99285 EMERGENCY DEPT VISIT HI MDM: CPT

## 2021-06-24 PROCEDURE — 85025 COMPLETE CBC W/AUTO DIFF WBC: CPT | Performed by: EMERGENCY MEDICINE

## 2021-06-24 PROCEDURE — 80053 COMPREHEN METABOLIC PANEL: CPT | Performed by: EMERGENCY MEDICINE

## 2021-06-24 PROCEDURE — 87077 CULTURE AEROBIC IDENTIFY: CPT | Performed by: EMERGENCY MEDICINE

## 2021-06-24 PROCEDURE — 87040 BLOOD CULTURE FOR BACTERIA: CPT | Performed by: EMERGENCY MEDICINE

## 2021-06-24 PROCEDURE — 83605 ASSAY OF LACTIC ACID: CPT | Performed by: EMERGENCY MEDICINE

## 2021-06-24 RX ADMIN — IOHEXOL 100 ML: 350 INJECTION, SOLUTION INTRAVENOUS at 23:47

## 2021-06-24 NOTE — TELEPHONE ENCOUNTER
PATIENT CALLED SAID NOT HAPPY AT ALL DR MIRELES WAS GOING TO CALL ME BACK YESTERDAY WITH ISSUES THAT PATIENT IS HAVING AND HE NEVER RECEIVED A CALL FROM HIS DOCTOR WHO SAID HE WOULD BE THERE FOR HIM  PATIENT REQUESTING A CALL FROM THE DOCTOR TODAY

## 2021-06-25 PROBLEM — R79.1 ELEVATED INR: Status: ACTIVE | Noted: 2021-06-25

## 2021-06-25 LAB
ANION GAP SERPL CALCULATED.3IONS-SCNC: 8 MMOL/L (ref 4–13)
ATRIAL RATE: 80 BPM
BUN SERPL-MCNC: 8 MG/DL (ref 5–25)
CALCIUM SERPL-MCNC: 8.1 MG/DL (ref 8.3–10.1)
CHLORIDE SERPL-SCNC: 97 MMOL/L (ref 100–108)
CO2 SERPL-SCNC: 26 MMOL/L (ref 21–32)
CREAT SERPL-MCNC: 0.85 MG/DL (ref 0.6–1.3)
ERYTHROCYTE [DISTWIDTH] IN BLOOD BY AUTOMATED COUNT: 15 % (ref 11.6–15.1)
GFR SERPL CREATININE-BSD FRML MDRD: 90 ML/MIN/1.73SQ M
GLUCOSE SERPL-MCNC: 121 MG/DL (ref 65–140)
HCT VFR BLD AUTO: 30 % (ref 36.5–49.3)
HGB BLD-MCNC: 10.1 G/DL (ref 12–17)
MCH RBC QN AUTO: 34.4 PG (ref 26.8–34.3)
MCHC RBC AUTO-ENTMCNC: 33.7 G/DL (ref 31.4–37.4)
MCV RBC AUTO: 102 FL (ref 82–98)
NRBC BLD AUTO-RTO: 0 /100 WBCS
P AXIS: 64 DEGREES
PLATELET # BLD AUTO: 143 THOUSANDS/UL (ref 149–390)
PMV BLD AUTO: 8.7 FL (ref 8.9–12.7)
POTASSIUM SERPL-SCNC: 3.7 MMOL/L (ref 3.5–5.3)
PR INTERVAL: 180 MS
QRS AXIS: 58 DEGREES
QRSD INTERVAL: 90 MS
QT INTERVAL: 444 MS
QTC INTERVAL: 512 MS
RBC # BLD AUTO: 2.94 MILLION/UL (ref 3.88–5.62)
SODIUM SERPL-SCNC: 131 MMOL/L (ref 136–145)
T WAVE AXIS: 72 DEGREES
VENTRICULAR RATE: 80 BPM
WBC # BLD AUTO: 27.76 THOUSAND/UL (ref 4.31–10.16)

## 2021-06-25 PROCEDURE — 93010 ELECTROCARDIOGRAM REPORT: CPT | Performed by: INTERNAL MEDICINE

## 2021-06-25 PROCEDURE — 96375 TX/PRO/DX INJ NEW DRUG ADDON: CPT

## 2021-06-25 PROCEDURE — 85027 COMPLETE CBC AUTOMATED: CPT | Performed by: PHYSICIAN ASSISTANT

## 2021-06-25 PROCEDURE — 96374 THER/PROPH/DIAG INJ IV PUSH: CPT

## 2021-06-25 PROCEDURE — 93005 ELECTROCARDIOGRAM TRACING: CPT

## 2021-06-25 PROCEDURE — 80048 BASIC METABOLIC PNL TOTAL CA: CPT | Performed by: PHYSICIAN ASSISTANT

## 2021-06-25 PROCEDURE — 99222 1ST HOSP IP/OBS MODERATE 55: CPT | Performed by: NURSE PRACTITIONER

## 2021-06-25 PROCEDURE — 99223 1ST HOSP IP/OBS HIGH 75: CPT | Performed by: PHYSICIAN ASSISTANT

## 2021-06-25 RX ORDER — ACETAMINOPHEN 325 MG/1
650 TABLET ORAL EVERY 6 HOURS PRN
Status: DISCONTINUED | OUTPATIENT
Start: 2021-06-25 | End: 2021-06-28 | Stop reason: HOSPADM

## 2021-06-25 RX ORDER — MONTELUKAST SODIUM 10 MG/1
10 TABLET ORAL DAILY
Status: DISCONTINUED | OUTPATIENT
Start: 2021-06-25 | End: 2021-06-28 | Stop reason: HOSPADM

## 2021-06-25 RX ORDER — CHLORAL HYDRATE 500 MG
1000 CAPSULE ORAL DAILY
Status: DISCONTINUED | OUTPATIENT
Start: 2021-06-25 | End: 2021-06-28 | Stop reason: HOSPADM

## 2021-06-25 RX ORDER — ONDANSETRON 2 MG/ML
4 INJECTION INTRAMUSCULAR; INTRAVENOUS EVERY 6 HOURS PRN
Status: DISCONTINUED | OUTPATIENT
Start: 2021-06-25 | End: 2021-06-28 | Stop reason: HOSPADM

## 2021-06-25 RX ORDER — ONDANSETRON 2 MG/ML
4 INJECTION INTRAMUSCULAR; INTRAVENOUS ONCE
Status: COMPLETED | OUTPATIENT
Start: 2021-06-25 | End: 2021-06-25

## 2021-06-25 RX ORDER — PANTOPRAZOLE SODIUM 40 MG/1
40 TABLET, DELAYED RELEASE ORAL
Status: DISCONTINUED | OUTPATIENT
Start: 2021-06-25 | End: 2021-06-28 | Stop reason: HOSPADM

## 2021-06-25 RX ORDER — PREDNISONE 10 MG/1
10 TABLET ORAL DAILY
Status: DISCONTINUED | OUTPATIENT
Start: 2021-06-25 | End: 2021-06-28 | Stop reason: HOSPADM

## 2021-06-25 RX ORDER — ATORVASTATIN CALCIUM 20 MG/1
20 TABLET, FILM COATED ORAL
Status: DISCONTINUED | OUTPATIENT
Start: 2021-06-25 | End: 2021-06-28 | Stop reason: HOSPADM

## 2021-06-25 RX ORDER — LORAZEPAM 0.5 MG/1
0.5 TABLET ORAL
Status: DISCONTINUED | OUTPATIENT
Start: 2021-06-25 | End: 2021-06-26

## 2021-06-25 RX ORDER — AMLODIPINE BESYLATE 5 MG/1
5 TABLET ORAL EVERY EVENING
Status: DISCONTINUED | OUTPATIENT
Start: 2021-06-25 | End: 2021-06-28 | Stop reason: HOSPADM

## 2021-06-25 RX ORDER — TAMSULOSIN HYDROCHLORIDE 0.4 MG/1
0.4 CAPSULE ORAL
Status: DISCONTINUED | OUTPATIENT
Start: 2021-06-25 | End: 2021-06-28 | Stop reason: HOSPADM

## 2021-06-25 RX ORDER — LANOLIN ALCOHOL/MO/W.PET/CERES
400 CREAM (GRAM) TOPICAL DAILY
Status: DISCONTINUED | OUTPATIENT
Start: 2021-06-25 | End: 2021-06-28 | Stop reason: HOSPADM

## 2021-06-25 RX ORDER — ZOLPIDEM TARTRATE 5 MG/1
10 TABLET ORAL
Status: DISCONTINUED | OUTPATIENT
Start: 2021-06-25 | End: 2021-06-28 | Stop reason: HOSPADM

## 2021-06-25 RX ORDER — AMOXICILLIN 250 MG
1 CAPSULE ORAL
Status: DISCONTINUED | OUTPATIENT
Start: 2021-06-25 | End: 2021-06-28 | Stop reason: HOSPADM

## 2021-06-25 RX ORDER — SODIUM CHLORIDE 9 MG/ML
100 INJECTION, SOLUTION INTRAVENOUS CONTINUOUS
Status: DISCONTINUED | OUTPATIENT
Start: 2021-06-25 | End: 2021-06-26

## 2021-06-25 RX ORDER — ASPIRIN 81 MG/1
81 TABLET, CHEWABLE ORAL DAILY
Status: DISCONTINUED | OUTPATIENT
Start: 2021-06-25 | End: 2021-06-28 | Stop reason: HOSPADM

## 2021-06-25 RX ORDER — LORAZEPAM 2 MG/ML
0.5 INJECTION INTRAMUSCULAR ONCE
Status: COMPLETED | OUTPATIENT
Start: 2021-06-25 | End: 2021-06-25

## 2021-06-25 RX ADMIN — CYANOCOBALAMIN TAB 500 MCG 500 MCG: 500 TAB at 09:15

## 2021-06-25 RX ADMIN — OMEGA-3 FATTY ACIDS CAP 1000 MG 1000 MG: 1000 CAP at 09:15

## 2021-06-25 RX ADMIN — ZOLPIDEM TARTRATE 10 MG: 5 TABLET, FILM COATED ORAL at 02:57

## 2021-06-25 RX ADMIN — Medication 400 MCG: at 09:15

## 2021-06-25 RX ADMIN — ATORVASTATIN CALCIUM 20 MG: 20 TABLET, FILM COATED ORAL at 15:53

## 2021-06-25 RX ADMIN — ASPIRIN 81 MG: 81 TABLET, CHEWABLE ORAL at 09:14

## 2021-06-25 RX ADMIN — ZOLPIDEM TARTRATE 10 MG: 5 TABLET, FILM COATED ORAL at 23:52

## 2021-06-25 RX ADMIN — SERTRALINE HYDROCHLORIDE 50 MG: 50 TABLET ORAL at 09:15

## 2021-06-25 RX ADMIN — ACETAMINOPHEN 650 MG: 325 TABLET, FILM COATED ORAL at 17:13

## 2021-06-25 RX ADMIN — TAMSULOSIN HYDROCHLORIDE 0.4 MG: 0.4 CAPSULE ORAL at 15:53

## 2021-06-25 RX ADMIN — LORAZEPAM 0.5 MG: 0.5 TABLET ORAL at 10:19

## 2021-06-25 RX ADMIN — AMLODIPINE BESYLATE 5 MG: 5 TABLET ORAL at 17:16

## 2021-06-25 RX ADMIN — SODIUM CHLORIDE 100 ML/HR: 0.9 INJECTION, SOLUTION INTRAVENOUS at 03:05

## 2021-06-25 RX ADMIN — PANTOPRAZOLE SODIUM 40 MG: 40 TABLET, DELAYED RELEASE ORAL at 05:25

## 2021-06-25 RX ADMIN — MONTELUKAST SODIUM 10 MG: 10 TABLET, COATED ORAL at 09:15

## 2021-06-25 RX ADMIN — ONDANSETRON 4 MG: 2 INJECTION INTRAMUSCULAR; INTRAVENOUS at 01:05

## 2021-06-25 RX ADMIN — DOCUSATE SODIUM AND SENNOSIDES 1 TABLET: 8.6; 5 TABLET, FILM COATED ORAL at 23:52

## 2021-06-25 RX ADMIN — CEFEPIME HYDROCHLORIDE 2000 MG: 2 INJECTION, POWDER, FOR SOLUTION INTRAVENOUS at 01:07

## 2021-06-25 RX ADMIN — PREDNISONE 10 MG: 10 TABLET ORAL at 09:15

## 2021-06-25 RX ADMIN — METOPROLOL TARTRATE 25 MG: 25 TABLET, FILM COATED ORAL at 23:52

## 2021-06-25 RX ADMIN — LORAZEPAM 0.5 MG: 2 INJECTION INTRAMUSCULAR; INTRAVENOUS at 19:44

## 2021-06-25 RX ADMIN — SODIUM CHLORIDE 1000 ML: 0.9 INJECTION, SOLUTION INTRAVENOUS at 01:07

## 2021-06-25 RX ADMIN — METOPROLOL TARTRATE 25 MG: 25 TABLET, FILM COATED ORAL at 09:15

## 2021-06-25 RX ADMIN — CEFEPIME HYDROCHLORIDE 2000 MG: 2 INJECTION, POWDER, FOR SOLUTION INTRAVENOUS at 13:11

## 2021-06-25 RX ADMIN — ACETAMINOPHEN 650 MG: 325 TABLET, FILM COATED ORAL at 02:57

## 2021-06-25 NOTE — ASSESSMENT & PLAN NOTE
· Na 129  · Some element of chronicity in the setting of leukemia  · Suspect poor p o  Intake over the last few days what contributed  · Status post 1 L IV fluid resuscitation, will begin IV fluid hydration at 100 mL/hr  · Last echo in January 2021 shows EF 60%   · a m   CMP

## 2021-06-25 NOTE — ASSESSMENT & PLAN NOTE
· Presented with fever and malaise for the last 2 days, nausea, poor p o  Intake  · Procalcitonin is pending  · UA shows innumerable bacteria and innumerable white blood cells, urine cultures pending  · CT abd & pelvis -   1 ) Moderate circumferential bladder wall thickening with air within the bladder lumen as well as within the wall of the bladder suspicious for emphysematous cystitis  Correlation with the patient's symptoms, laboratory values, and urinalysis recommended  2) Subtle heterogeneous enhancement of the bilateral kidneys, consider pyelonephritis  No hydronephrosis  3) Subtle nodular contour of the liver, consider a component of hepatic cirrhosis  Associated splenomegaly, nonspecific  4) Colonic diverticulosis without evidence of acute diverticulitis, small hiatal hernia, coronary atherosclerosis, and other findings   · Does have a history of pseudomonal UTI which was susceptible to cefepime    This occurred may 2021  · IV cefepime for now

## 2021-06-25 NOTE — CONSULTS
UROLOGY CONSULTATION NOTE     Patient Identifiers: Bartolo Fonseca (MRN [id number])  Service Requesting Consultation: Internal Med   Service Providing Consultation:  Urology, Danise Moritz, CRNP    Date of Service: 6/25/2021  Consults    Reason for Consultation: UTI    ASSESSMENT:   UTI  Urinary retention   Leukocytosis 2/2 to CLL     PLAN:   Continue antibiotics  Place wolfe while in hospital, consider long term use as opposed to CIC at home   Urine culture pending  Blood cultures pending       History of Present Illness:     Bartolo Fonseca is a 78 y o  old with a history of BPH with chronic self catheterization 2-3 times daily since Feb 2020, TURP x 2, bladder diverticulum, CLL, anxiety, depression, GERD, hypertension, ELI, OA, and pulmonary hypertension  Patient developed fevers over the previous two days up to 102 with chills and fatigue  He noticed foul smelling urine this past week but denies any other  symptoms  Patient follows with Dr Lindy Lau and last seen Feb 2021 with cysto June 2020 where he was found to have 2 sizable bladder diverticula thought to be the etiology of his incomplete bladder emptying  Patient has recently increased his self-catheterization from 2 to 3 times per day      Past Medical, Past Surgical History:     Past Medical History:   Diagnosis Date    Anxiety     BPH (benign prostatic hypertrophy)     Cancer (HCC)     CLL    [facility]     [facility]    Colon polyp     Concussion     Resolved: 08/22/16    Depression     Gastrointestinal hemorrhage     Last assessed: 08/27/13    GERD (gastroesophageal reflux disease)     Hearing loss of aging     Hiatal hernia     History of transfusion     Hyperlipidemia     Hypertension     Iron deficiency anemia     Microscopic hematuria     Last assessed: 06/28/13    OA (osteoarthritis)     right hip    Pneumothorax     Primary osteoarthritis of right hip 9/29/2017    He is status post right hip arthroplasty    Prostatitis     Pulmonary hypertension (HCC)     Seasonal allergies     Urinary tract infection associated with catheterization of urinary tract [facility] ) 2/5/2021    Pseudomonal UTI asymptomatic  Per Urology do not treat at this time      UTI (urinary tract infection)     in past    Wears glasses    :    Past Surgical History:   Procedure Laterality Date    COLONOSCOPY      CYSTOSCOPY  10/09/2014    Diagnostic    CYSTOSCOPY  06/29/2020    FRACTURE SURGERY      left lower arm    FRACTURE SURGERY      left femur    HERNIA REPAIR      JOINT REPLACEMENT Right     hip    OTHER SURGICAL HISTORY  03/2011    Spinal anesthesia epidural    NE TOTAL HIP ARTHROPLASTY Right 9/29/2017    Procedure: ARTHROPLASTY HIP TOTAL ANTERIOR;  Surgeon: Anne Bonilla MD;  Location: [facility];  Service: Orthopedics    TONSILLECTOMY AND ADENOIDECTOMY      TRANSURETHRAL RESECTION OF PROSTATE      x 2    WRIST SURGERY     :    Medications, Allergies:     Current Facility-Administered Medications   Medication Dose Route Frequency    acetaminophen (TYLENOL) tablet 650 mg  650 mg Oral Q6H PRN    amLODIPine (NORVASC) tablet 5 mg  5 mg Oral QPM    aspirin chewable tablet 81 mg  81 mg Oral Daily    atorvastatin (LIPITOR) tablet 20 mg  20 mg Oral Daily With Dinner    cefepime (MAXIPIME) 2,000 mg in dextrose 5 % 50 mL IVPB  2,000 mg Intravenous Q12H    cyanocobalamin (VITAMIN B-12) tablet 500 mcg  500 mcg Oral Daily    enoxaparin (LOVENOX) subcutaneous injection 40 mg  40 mg Subcutaneous Daily    fish oil capsule 1,000 mg  1,000 mg Oral Daily    folic acid (FOLVITE) tablet 400 mcg  400 mcg Oral Daily    LORazepam (ATIVAN) tablet 0 5 mg  0 5 mg Oral HS PRN    metoprolol tartrate (LOPRESSOR) tablet 25 mg  25 mg Oral Q12H [facility]    montelukast (SINGULAIR) tablet 10 mg  10 mg Oral Daily    ondansetron (ZOFRAN) injection 4 mg  4 mg Intravenous Q6H PRN    pantoprazole (PROTONIX) EC tablet 40 mg  40 mg Oral Early Morning    predniSONE tablet 10 mg  10 mg Oral Daily    senna-docusate sodium (SENOKOT S) 8 6-50 mg per tablet 1 tablet  1 tablet Oral HS    sertraline (ZOLOFT) tablet 50 mg  50 mg Oral Daily    sodium chloride 0 9 % infusion  100 mL/hr Intravenous Continuous    tamsulosin (FLOMAX) capsule 0 4 mg  0 4 mg Oral Daily With Dinner    zolpidem (AMBIEN) tablet 10 mg  10 mg Oral HS PRN       Allergies: Allergies   Allergen Reactions    Codeine Other (See Comments)     agitated    Sulfamethoxazole-Trimethoprim GI Intolerance and Abdominal Pain     upset stomach   :    Social and Family History:   Social History:   Social History     Tobacco Use    Smoking status: Former Smoker     Packs/day: 1 00     Years: 15 00     Pack years: 15 00     Types: Cigarettes     Start date:      Quit date: 1980     Years since quittin 8    Smokeless tobacco: Never Used   Vaping Use    Vaping Use: Never used   Substance Use Topics    Alcohol use: Yes     Alcohol/week: 10 0 standard drinks     Types: 10 Cans of beer per week    Drug use: Not Currently     Types: Marijuana         Social History     Tobacco Use   Smoking Status Former Smoker    Packs/day:  00    Years: 15 00    Pack years: 15 00    Types: Cigarettes    Start date:     Quit date: 1980    Years since quittin 8   Smokeless Tobacco Never Used       Family History:  Family History   Problem Relation Age of Onset    No Known Problems Mother     Heart attack Father     Diabetes Brother     Prostate cancer Brother    :     Review of Systems:     Review of Systems - History obtained from spouse, chart review and the patient  General ROS: positive for  - chills, fatigue and fever  Psychological ROS: negative  Allergy and Immunology ROS: negative  Hematological and Lymphatic ROS: negative  Respiratory ROS: no cough, shortness of breath, or wheezing  Cardiovascular ROS: no chest pain or dyspnea on exertion  Gastrointestinal ROS: no abdominal pain, change in bowel habits, or black or bloody stools  Genito-Urinary ROS: no dysuria, trouble voiding, or hematuria  Positive for foul smelling urine   Musculoskeletal ROS: negative  Neurological ROS: no TIA or stroke symptoms    All other systems queried were negative  Physical Exam:   General: Patient is pleasant and in NAD  Awake and alert  /92 (BP Location: Left arm)   Pulse 89   Temp 97 5 °F (36 4 °C) (Oral)   Resp 20   Wt 94 kg (207 lb 3 7 oz)   SpO2 94%   BMI 31 51 kg/m² Temp (24hrs), Av 5 °F (37 5 °C), Min:97 5 °F (36 4 °C), Max:102 1 °F (38 9 °C)  current; Temperature: 97 5 °F (36 4 °C)  I/O last 24 hours: In: 48 [IV Piggyback:50]  Out: -     /92 (BP Location: Left arm)   Pulse 89   Temp 97 5 °F (36 4 °C) (Oral)   Resp 20   Wt 94 kg (207 lb 3 7 oz)   SpO2 94%   BMI 31 51 kg/m²   General appearance: alert and oriented, in no acute distress  Neck: no adenopathy, no carotid bruit, no JVD, supple, symmetrical, trachea midline and thyroid not enlarged, symmetric, no tenderness/mass/nodules  Back: symmetric, no curvature  ROM normal  No CVA tenderness    Lungs: clear to auscultation bilaterally  Heart: regular rate and rhythm, S1, S2 normal, no murmur, click, rub or gallop  Abdomen: soft, non-tender; bowel sounds normal; no masses,  no organomegaly  Male genitalia: normal  Pulses: 2+ and symmetric  Skin: pale    Labs:     Lab Results   Component Value Date    HGB 10 1 (L) 2021    HCT 30 0 (L) 2021    WBC 27 76 (H) 2021     (L) 2021   ]    Lab Results   Component Value Date     (L) 2015    K 3 7 2021    CL 97 (L) 2021    CO2 26 2021    BUN 8 2021    CREATININE 0 85 2021    CALCIUM 8 1 (L) 2021    GLUCOSE 130 01/10/2021   ]    Imaging:   I personally reviewed the images and report of the following studies, and reviewed them with the patient:    CT abd/pelvis- Moderate circumferential bladder wall thickening with air within the bladder lumen as well as within the wall of the bladder (axial image 79, series 2), suspicious for emphysematous cystitis  Correlation with the patient's symptoms, laboratory values,   and urinalysis recommended      Subtle heterogeneous enhancement of the bilateral kidneys, consider pyelonephritis  No hydronephrosis  Thank you for allowing me to participate in this patients care  Please do not hesitate to call with any additional questions    Hakan Alvarado

## 2021-06-25 NOTE — ASSESSMENT & PLAN NOTE
· Requires self catheterization twice daily, will continue this regimen while inpatient  · Urinary retention protocol  · Urology consultation has been placed

## 2021-06-25 NOTE — ED NOTES
Per Dr Xavier Graves, patient is allowed to self cath patient in ER for urine as patient self caths at home frequently  Patient has clean supplies with him at this time        Timi John RN  06/24/21 2221

## 2021-06-25 NOTE — ED NOTES
Pt states "I feel like I have a fever " Requesting temp to be retaken  Oral temp 99 4        Gloria oMntez RN  06/25/21 0769

## 2021-06-25 NOTE — PLAN OF CARE
Problem: Potential for Falls  Goal: Patient will remain free of falls  Description: INTERVENTIONS:  - Educate patient/family on patient safety including physical limitations  - Instruct patient to call for assistance with activity   - Consult OT/PT to assist with strengthening/mobility   - Keep Call bell within reach  - Keep bed low and locked with side rails adjusted as appropriate  - Keep care items and personal belongings within reach  - Initiate and maintain comfort rounds  - Make Fall Risk Sign visible to staff  - Offer Toileting every  Hours, in advance of need  - Initiate/Maintain alarm  - Obtain necessary fall risk management equipment:   - Apply yellow socks and bracelet for high fall risk patients  - Consider moving patient to room near nurses station  Outcome: Progressing     Problem: PAIN - ADULT  Goal: Verbalizes/displays adequate comfort level or baseline comfort level  Description: Interventions:  - Encourage patient to monitor pain and request assistance  - Assess pain using appropriate pain scale  - Administer analgesics based on type and severity of pain and evaluate response  - Implement non-pharmacological measures as appropriate and evaluate response  - Consider cultural and social influences on pain and pain management  - Notify physician/advanced practitioner if interventions unsuccessful or patient reports new pain  Outcome: Progressing     Problem: INFECTION - ADULT  Goal: Absence or prevention of progression during hospitalization  Description: INTERVENTIONS:  - Assess and monitor for signs and symptoms of infection  - Monitor lab/diagnostic results  - Monitor all insertion sites, i e  indwelling lines, tubes, and drains  - Monitor endotracheal if appropriate and nasal secretions for changes in amount and color  - [place] appropriate cooling/warming therapies per order  - Administer medications as ordered  - Instruct and encourage patient and family to use good hand hygiene technique  - Identify and instruct in appropriate isolation precautions for identified infection/condition  Outcome: Progressing  Goal: Absence of fever/infection during neutropenic period  Description: INTERVENTIONS:  - Monitor WBC    Outcome: Progressing     Problem: SAFETY ADULT  Goal: Patient will remain free of falls  Description: INTERVENTIONS:  - Educate patient/family on patient safety including physical limitations  - Instruct patient to call for assistance with activity   - Consult OT/PT to assist with strengthening/mobility   - Keep Call bell within reach  - Keep bed low and locked with side rails adjusted as appropriate  - Keep care items and personal belongings within reach  - Initiate and maintain comfort rounds  - Make Fall Risk Sign visible to staff  - Offer Toileting every  Hours, in advance of need  - Initiate/Maintain alarm  - Obtain necessary fall risk management equipment:   - Apply yellow socks and bracelet for high fall risk patients  - Consider moving patient to room near nurses station  Outcome: Progressing  Goal: Maintain or return to baseline ADL function  Description: INTERVENTIONS:  -  Assess patient's ability to carry out ADLs; assess patient's baseline for ADL function and identify physical deficits which impact ability to perform ADLs (bathing, care of mouth/teeth, toileting, grooming, dressing, etc )  - Assess/evaluate cause of self-care deficits   - Assess range of motion  - Assess patient's mobility; develop plan if impaired  - Assess patient's need for assistive devices and provide as appropriate  - Encourage maximum independence but intervene and supervise when necessary  - Involve family in performance of ADLs  - Assess for home care needs following discharge   - Consider OT consult to assist with ADL evaluation and planning for discharge  - Provide patient education as appropriate  Outcome: Progressing  Goal: Maintains/Returns to pre admission functional level  Description: INTERVENTIONS:  - Perform BMAT or MOVE assessment daily    - Set and communicate daily mobility goal to care team and patient/family/caregiver  - Collaborate with rehabilitation services on mobility goals if consulted  - Perform Range of Motion  times a day  - Reposition patient every  hours  - Dangle patient  times a day  - Stand patient  times a day  - Ambulate patient  times a day  - Out of bed to chair  times a day   - Out of bed for meal times a day  - Out of bed for toileting  - Record patient progress and toleration of activity level   Outcome: Progressing     Problem: DISCHARGE PLANNING  Goal: Discharge to home or other facility with appropriate resources  Description: INTERVENTIONS:  - Identify barriers to discharge w/patient and caregiver  - Arrange for needed discharge resources and transportation as appropriate  - Identify discharge learning needs (meds, wound care, etc )  - Arrange for interpretive services to assist at discharge as needed  - Refer to Case Management Department for coordinating discharge planning if the patient needs post-hospital services based on physician/advanced practitioner order or complex needs related to functional status, cognitive ability, or social support system  Outcome: Progressing     Problem: Knowledge Deficit  Goal: Patient/family/caregiver demonstrates understanding of disease process, treatment plan, medications, and discharge instructions  Description: Complete learning assessment and assess knowledge base    Interventions:  - Provide teaching at level of understanding  - Provide teaching via preferred learning methods  Outcome: Progressing

## 2021-06-25 NOTE — ASSESSMENT & PLAN NOTE
· Diagnosis in 2009 following with Oncology  · Recently underwent prednisone taper from chronic prednisone use  · White blood cell 32 29 which is actually on the lower side for this patient  · A m  CBC with diff

## 2021-06-25 NOTE — ASSESSMENT & PLAN NOTE
· Was hospitalized for COVID-19 in January 2021, did have to go to the ICU at 1 point, requiring intubation  · This history is noted simply in case patient has positive COVID screening, his infection was months ago

## 2021-06-25 NOTE — ED PROVIDER NOTES
Pt Name: Joseph Hudson  MRN: [id number]  [place] 1950  Age/Sex: 78 y o  male  Date of evaluation: 6/24/2021  PCP: Roosevelt Leiva MD    [address] [place] [address]    Chief Complaint   Patient presents with    Possible UTI     Pt reports foul smelling urine and intermittent fevers since Tuesday  Reports caths self daily  States temp of 100 0 this evening and took tylenol around 1930  HPI  Lili Peters presents to the Emergency Department complaining of fever  He has had foul smelling urine  He self cath's twice daily  He otherwise feels well but was concerned because he has plans this weekend  HPI      Past Medical and Surgical History    Past Medical History:   Diagnosis Date    Anxiety     BPH (benign prostatic hypertrophy)     Cancer (HCC)     CLL    CLL (chronic lymphocytic leukemia) ([facility] )     2009    Colon polyp     Concussion     Resolved: 08/22/16    Depression     Gastrointestinal hemorrhage     Last assessed: 08/27/13    GERD (gastroesophageal reflux disease)     Hearing loss of aging     Hiatal hernia     History of transfusion     Hyperlipidemia     Hypertension     Iron deficiency anemia     Microscopic hematuria     Last assessed: 06/28/13    OA (osteoarthritis)     right hip    Pneumothorax     Primary osteoarthritis of right hip 9/29/2017    He is status post right hip arthroplasty    Prostatitis     Pulmonary hypertension ([facility] )     Seasonal allergies     Urinary tract infection associated with catheterization of urinary tract ([facility] ) 2/5/2021    Pseudomonal UTI asymptomatic  Per Urology do not treat at this time      UTI (urinary tract infection)     in past    Wears glasses        Past Surgical History:   Procedure Laterality Date    COLONOSCOPY      CYSTOSCOPY  10/09/2014    Diagnostic    CYSTOSCOPY  06/29/2020    FRACTURE SURGERY      left lower arm    FRACTURE SURGERY      left femur    HERNIA REPAIR      JOINT REPLACEMENT Right     hip    OTHER SURGICAL HISTORY  2011    Spinal anesthesia epidural    [place] TOTAL HIP ARTHROPLASTY Right 2017    Procedure: ARTHROPLASTY HIP TOTAL ANTERIOR;  Surgeon: Gary Duarte MD;  Location: [facility];  Service: Orthopedics    TONSILLECTOMY AND ADENOIDECTOMY      TRANSURETHRAL RESECTION OF PROSTATE      x 2    WRIST SURGERY         Family History   Problem Relation Age of Onset    No Known Problems Mother     Heart attack Father     Diabetes Brother     Prostate cancer Brother        Social History     Tobacco Use    Smoking status: Former Smoker     Packs/day: 1 00     Years: 15 00     Pack years: 15 00     Types: Cigarettes     Start date:      Quit date: 1980     Years since quittin 8    Smokeless tobacco: Never Used   Vaping Use    Vaping Use: Never used   Substance Use Topics    Alcohol use: Yes     Alcohol/week: 10 0 standard drinks     Types: 10 Cans of beer per week    Drug use: Not Currently     Types: Marijuana           Allergies    Allergies   Allergen Reactions    Codeine Other (See Comments)     agitated    Sulfamethoxazole-Trimethoprim GI Intolerance and Abdominal Pain     upset stomach       Home Medications    Prior to Admission medications    Medication Sig Start Date End Date Taking?  Authorizing Provider   amLODIPine (NORVASC) 5 mg tablet TAKE ONE TABLET BY MOUTH EVERY EVENING 8/3/20  Yes Arsenio Paul MD   aspirin 81 MG tablet Take 1 tablet by mouth daily   Yes Historical Provider, MD   atorvastatin (LIPITOR) 20 mg tablet TAKE ONE TABLET BY MOUTH ONCE EVERY DAY 21  Yes Historical Provider, MD   folic acid (FOLVITE) 815 mcg tablet Take 1 tablet (400 mcg total) by mouth daily 21  Yes Chidi Prather MD   metoprolol tartrate (LOPRESSOR) 25 mg tablet Take 1 tablet (25 mg total) by mouth every 12 (twelve) hours 3/17/21  Yes Arsenio Paul MD   montelukast (SINGULAIR) 10 mg tablet TAKE ONE TABLET BY MOUTH DAILY 21  Yes Arsenio Paul MD Multiple Vitamin (multivitamin) tablet Take 1 tablet by mouth daily 1/22/21  Yes Geri Hewitt MD   Omega-3 Fatty Acids (FISH OIL) 1,000 mg Take by mouth daily   Yes Pepe Lewis MD   omeprazole (PriLOSEC) 40 MG capsule TAKE ONE CAPSULE BY MOUTH ONCE EVERY DAY 2/17/21  Yes Geri Hewitt MD   senna-docusate sodium (SENOKOT S) 8 6-50 mg per tablet Take 1 tablet by mouth daily at bedtime 1/21/21  Yes Juan Antonio Styles MD   sertraline (ZOLOFT) 50 mg tablet TAKE ONE TABLET BY MOUTH EVERY DAY 5/10/21  Yes Geri Hewitt MD   tamsulosin [facility]) 0 4 mg Take 1 capsule (0 4 mg total) by mouth daily with dinner CORRECTED SCRIPT! Note change in SIG and QUANTITY  Please process accordingly  Thank you! (9/1/20) 2/17/21  Yes Geri Hewitt MD   vitamin B-12 (CYANOCOBALAMIN) 500 MCG TABS Take 1 tablet (500 mcg total) by mouth daily 4/12/21 6/24/21 Yes Luis Woods MD   montelukast (SINGULAIR) 10 mg tablet Take 1 tablet (10 mg total) by mouth daily 12/5/19   Geri Hewitt MD           Review of Systems    Review of Systems   Constitutional: Positive for fever  Negative for chills  HENT: Negative for ear pain and sore throat  Eyes: Negative for pain and visual disturbance  Respiratory: Negative for cough and shortness of breath  Cardiovascular: Negative for chest pain and palpitations  Gastrointestinal: Positive for abdominal pain  Negative for vomiting  Genitourinary: Positive for difficulty urinating  Negative for dysuria and hematuria  Musculoskeletal: Negative for arthralgias and back pain  Skin: Negative for color change and rash  Neurological: Negative for seizures and syncope  All other systems reviewed and are negative        Physical Exam      ED Triage Vitals [06/24/21 2037]   Temperature Pulse Respirations Blood Pressure SpO2   99 9 °F (37 7 °C) 85 18 141/81 96 %      Temp Source Heart Rate Source Patient Position - Orthostatic VS BP Location FiO2 (%)   Oral Monitor Sitting Right arm --      Pain Score       --               Physical Exam  Vitals and nursing note reviewed  Constitutional:       General: He is not in acute distress  Appearance: He is well-developed  He is not diaphoretic  HENT:      Head: Normocephalic and atraumatic  Nose: Nose normal    Eyes:      Conjunctiva/sclera: Conjunctivae normal       Pupils: Pupils are equal, round, and reactive to light  Cardiovascular:      Rate and Rhythm: Normal rate and regular rhythm  Heart sounds: Normal heart sounds  No murmur heard  No friction rub  No gallop  Pulmonary:      Effort: Pulmonary effort is normal  No respiratory distress  Breath sounds: Normal breath sounds  No wheezing or rales  Abdominal:      General: Bowel sounds are normal       Palpations: Abdomen is soft  Tenderness: There is abdominal tenderness in the left upper quadrant and left lower quadrant  There is no guarding or rebound  Musculoskeletal:         General: Normal range of motion  Cervical back: Normal range of motion and neck supple  Skin:     General: Skin is warm and dry  Neurological:      Mental Status: He is alert and oriented to person, place, and time  Psychiatric:         Behavior: Behavior normal                 Assessment and Plan    Ronna Khoury is a 78 y o  male who presents with fever  Physical examination remarkable for abdominal tenderness  Differential diagnosis (not completely inclusive) includes intra-abdominal pathology  Plan will be to perform diagnostic testing and treat symptomatically        MDM    Diagnostic Results        Labs:    Results for orders placed or performed during the hospital encounter of 06/24/21   UA w Reflex to Microscopic w Reflex to Culture    Specimen: Urine, Straight Cath   Result Value Ref Range    Color, UA Yellow     Clarity, UA Cloudy     Specific [place], UA 1 015 1 003 - 1 030    pH, UA 6 0 4 5, 5 0, 5 5, 6 0, 6 5, 7 0, 7 5, 8 0 Leukocytes, UA Moderate (A) Negative    Nitrite, UA Negative Negative    Protein, UA 30 (1+) (A) Negative mg/dl    Glucose, UA Negative Negative mg/dl    Ketones, UA Negative Negative mg/dl    Urobilinogen, UA 0 2 0 2, 1 0 E U /dl E U /dl    Bilirubin, UA Negative Negative    Blood, UA Large (A) Negative   CBC and differential   Result Value Ref Range    WBC 32 29 (HH) 4 31 - 10 16 Thousand/uL    RBC 3 07 (L) 3 88 - 5 62 Million/uL    Hemoglobin 10 7 (L) 12 0 - 17 0 g/dL    Hematocrit 31 2 (L) 36 5 - 49 3 %     (H) 82 - 98 fL    MCH 34 9 (H) 26 8 - 34 3 pg    MCHC 34 3 31 4 - 37 4 g/dL    RDW 14 9 11 6 - 15 1 %    MPV 9 0 8 9 - 12 7 fL    Platelets 853 008 - 176 Thousands/uL    nRBC 0 /100 WBCs   Comprehensive metabolic panel   Result Value Ref Range    Sodium 129 (L) 136 - 145 mmol/L    Potassium 3 9 3 5 - 5 3 mmol/L    Chloride 92 (L) 100 - 108 mmol/L    CO2 29 21 - 32 mmol/L    ANION GAP 8 4 - 13 mmol/L    BUN 10 5 - 25 mg/dL    Creatinine 0 77 0 60 - 1 30 mg/dL    Glucose 115 65 - 140 mg/dL    Calcium 8 4 8 3 - 10 1 mg/dL    AST 22 5 - 45 U/L    ALT 43 12 - 78 U/L    Alkaline Phosphatase 83 46 - 116 U/L    Total Protein 7 7 6 4 - 8 2 g/dL    Albumin 3 5 3 5 - 5 0 g/dL    Total Bilirubin 0 88 0 20 - 1 00 mg/dL    eGFR 93 ml/min/1 73sq m   Lactic acid   Result Value Ref Range    LACTIC ACID 0 6 0 5 - 2 0 mmol/L   Urine Microscopic   Result Value Ref Range    RBC, UA 2-4 None Seen, 2-4 /hpf    WBC, UA Innumerable (A) None Seen, 2-4 /hpf    Epithelial Cells Occasional None Seen, Occasional /hpf    Bacteria, UA Innumerable (A) None Seen, Occasional /hpf   Manual Differential(PHLEBS Do Not Order)   Result Value Ref Range    Segmented % 17 (L) 43 - 75 %    Lymphocytes % 82 (H) 14 - 44 %    Monocytes % 0 (L) 4 - 12 %    Eosinophils, % 0 0 - 6 %    Basophils % 1 0 - 1 %    Absolute Neutrophils 5 49 1 85 - 7 62 Thousand/uL    Lymphocytes Absolute 26 48 (H) 0 60 - 4 47 Thousand/uL    Monocytes Absolute 0 00 0 00 - 1 22 Thousand/uL    Eosinophils Absolute 0 00 0 00 - 0 40 Thousand/uL    Basophils Absolute 0 32 (H) 0 00 - 0 10 Thousand/uL    Total Counted 100     Smudge Cells Present     Macrocytes Present     Platelet Estimate Adequate Adequate       All labs reviewed and utilized in the medical decision making process    Radiology:    CT abdomen pelvis with contrast   Final Result      Moderate circumferential bladder wall thickening with air within the bladder lumen as well as within the wall of the bladder (axial image 79, series 2), suspicious for emphysematous cystitis  Correlation with the patient's symptoms, laboratory values,    and urinalysis recommended  Subtle heterogeneous enhancement of the bilateral kidneys, consider pyelonephritis  No hydronephrosis  Subtle nodular contour of the liver, consider a component of hepatic cirrhosis  Associated splenomegaly, nonspecific  Colonic diverticulosis without evidence of acute diverticulitis, small hiatal hernia, coronary atherosclerosis, and other findings as above  I personally discussed this study with Pat Arroyo on 6/25/2021 at 1:00 AM                      Workstation performed: [id number]             All radiology studies independently viewed by me and interpreted by the radiologist     Procedure    Procedures      ED Course of Care and Re-Assessments    Patient admitted to [facility]       Medications   sodium chloride 0 9 % bolus 1,000 mL (1,000 mL Intravenous New Bag 6/25/21 0107)   cefepime (MAXIPIME) 2 g/50 mL dextrose IVPB (2,000 mg Intravenous New Bag 6/25/21 0107)   iohexol (OMNIPAQUE) 350 MG/ML injection (SINGLE-DOSE) 100 mL (100 mL Intravenous Given 6/24/21 2347)   ondansetron (ZOFRAN) injection 4 mg (4 mg Intravenous Given 6/25/21 0105)           FINAL IMPRESSION    Final diagnoses:   Emphysematous cystitis         DISPOSITION/PLAN    Time reflects when diagnosis was documented in both MDM as applicable and the Disposition within this note     Time User Action Codes Description Comment    6/25/2021  1:03 AM Edenilson Melgar YESSICA Add [N30 [id number]] Emphysematous cystitis       ED Disposition     ED Disposition Condition Date/Time Comment    Admit Stable Fri Jun 25, 2021  1:15 AM Case was discussed with MILLI and the patient's admission status was agreed to be Admission Status: inpatient status to the service of Dr Preston Díaz   Follow-up Information    None           PATIENT REFERRED TO:    No follow-up provider specified  DISCHARGE MEDICATIONS:    Patient's Medications   Discharge Prescriptions    No medications on file       No discharge procedures on file           Sheila Aponte, [address] [place] [address],   06/25/21 8897

## 2021-06-25 NOTE — H&P
[address] [facility] [place] 1950, 78 y o  male MRN: [id number]  Unit/Bed#: E5 -01 Encounter: [id number]  Primary Care Provider: Osvaldo Guallpa MD   Date and time admitted to hospital: 6/24/2021  8:34 PM    * Urinary tract infection associated with catheterization of urinary tract [facility])  Assessment & Plan  · Presented with fever and malaise for the last 2 days, nausea, poor p o  Intake  · Procalcitonin is pending  · UA shows innumerable bacteria and innumerable white blood cells, urine cultures pending  · CT abd & pelvis -   1 ) Moderate circumferential bladder wall thickening with air within the bladder lumen as well as within the wall of the bladder suspicious for emphysematous cystitis  Correlation with the patient's symptoms, laboratory values, and urinalysis recommended  2) Subtle heterogeneous enhancement of the bilateral kidneys, consider pyelonephritis  No hydronephrosis  3) Subtle nodular contour of the liver, consider a component of hepatic cirrhosis  Associated splenomegaly, nonspecific  4) Colonic diverticulosis without evidence of acute diverticulitis, small hiatal hernia, coronary atherosclerosis, and other findings   · Does have a history of pseudomonal UTI which was susceptible to cefepime  This occurred may 2021  · IV cefepime for now    CLL (chronic lymphocytic leukemia) ([facility] [address] )  Assessment & Plan  · Diagnosis in 2009 following with Oncology  · Recently underwent prednisone taper from chronic prednisone use  · White blood cell 32 29 which is actually on the lower side for this patient  · A m  CBC with diff    H/o COVID-19  Assessment & Plan  · Was hospitalized for COVID-19 in January 2021, did have to go to the ICU at 1 point, requiring intubation  · This history is noted simply in case patient has positive COVID screening, his infection was months ago      Hyponatremia  Assessment & Plan  · Na 129  · Some element of chronicity in the setting of leukemia  · Suspect poor p o  Intake over the last few days what contributed  · Status post 1 L IV fluid resuscitation, will begin IV fluid hydration at 100 mL/hr  · Last echo in January 2021 shows EF 60%   · a m  CMP    BPH with obstruction/lower urinary tract symptoms  Assessment & Plan  · Requires self catheterization twice daily, will continue this regimen while inpatient  · Urinary retention protocol  · Urology consultation has been placed    Insomnia  Assessment & Plan  · Takes Ambien and Ativan at home  · PDMP has been reviewed    Essential hypertension  Assessment & Plan  · Continue amlodipine from home  · Blood pressure is well controlled 145/70 in the ED      VTE Pharmacologic Prophylaxis: VTE Score: 4 Moderate Risk (Score 3-4) - Pharmacological DVT Prophylaxis Ordered: enoxaparin (Lovenox)  Code Status: level 1 - full code  Discussion with family: Updated  (wife) at bedside  Anticipated Length of Stay: Patient will be admitted on an inpatient basis with an anticipated length of stay of greater than 2 midnights secondary to iv antibiotics, urology consultation  Total Time for Visit, including Counseling / Coordination of Care: 45 minutes Greater than 50% of this total time spent on direct patient counseling and coordination of care  Chief Complaint:  Fever    History of Present Illness:  Aries Rojas is a 78 y o  male with a PMH of BPH with chronic self catheterization twice daily, CLL, anxiety, depression, GERD, hypertension, ELI, OA, pulmonary hypertension, history of COVID pneumonia in January 2021 who presents with fever for the last 2 days, taking Tylenol at home but the fever keeps coming back with shaking chills  This patient admits to foul-smelling urine  He self catheterized himself twice a day, denies urinary urgency, frequency, dysuria  Denies hematuria, flank pain    Patient admits to experiencing decreased appetite, nausea, vomiting, generalized abdominal pain that comes in waves before the nausea and vomiting  Patient complaining of malaise, fatigue in excessive tiredness since he has been sick for the last few days  Patient denies change in bowel habit, change in weight  Patient denies shortness of breath, pleurisy, chest pain, lightheadedness, dizziness, syncope or presyncopal feeling  Patient has taken all his medications, no recent changes besides prednisone taper which was completed earlier this month  No travel, no sick contacts, patient did have history of COVID in January 2021  Review of Systems:  Review of Systems   Constitutional: Positive for activity change, appetite change, chills, fatigue and fever  Negative for diaphoresis and unexpected weight change  HENT: Negative  Respiratory: Negative  Cardiovascular: Negative  Gastrointestinal: Positive for abdominal pain, nausea and vomiting  Genitourinary: Positive for difficulty urinating  Negative for decreased urine volume, discharge, dysuria, flank pain, frequency, hematuria, penile pain, penile swelling, testicular pain and urgency  Baseline difficulty urinating secondary to BPH, patient straight caths himself twice a day  Musculoskeletal: Negative  Skin: Negative  Neurological: Positive for weakness  Psychiatric/Behavioral: Negative  All other systems reviewed and are negative        Past Medical and Surgical History:   Past Medical History:   Diagnosis Date    Anxiety     BPH (benign prostatic hypertrophy)     Cancer (HCC)     CLL    CLL (chronic lymphocytic leukemia) ([facility] [address] [facility]     2009    Colon polyp     Concussion     Resolved: 08/22/16    Depression     Gastrointestinal hemorrhage     Last assessed: 08/27/13    GERD (gastroesophageal reflux disease)     Hearing loss of aging     Hiatal hernia     History of transfusion     Hyperlipidemia     Hypertension     Iron deficiency anemia     Microscopic hematuria     Last assessed: 06/28/13    OA (osteoarthritis)     right hip    Pneumothorax     Primary osteoarthritis of right hip 9/29/2017    He is status post right hip arthroplasty    Prostatitis     Pulmonary hypertension (HCC)     Seasonal allergies     Urinary tract infection associated with catheterization of urinary tract ([facility] [address] ) 2/5/2021    Pseudomonal UTI asymptomatic  Per Urology do not treat at this time   UTI (urinary tract infection)     in past    Wears glasses        Past Surgical History:   Procedure Laterality Date    COLONOSCOPY      CYSTOSCOPY  10/09/2014    Diagnostic    CYSTOSCOPY  06/29/2020    FRACTURE SURGERY      left lower arm    FRACTURE SURGERY      left femur    HERNIA REPAIR      JOINT REPLACEMENT Right     hip    OTHER SURGICAL HISTORY  03/2011    Spinal anesthesia epidural    PA TOTAL HIP ARTHROPLASTY Right 9/29/2017    Procedure: ARTHROPLASTY HIP TOTAL ANTERIOR;  Surgeon: Delvin Reece MD;  Location: [facility];  Service: Orthopedics    TONSILLECTOMY AND ADENOIDECTOMY      TRANSURETHRAL RESECTION OF PROSTATE      x 2    WRIST SURGERY         Meds/Allergies:  Prior to Admission medications    Medication Sig Start Date End Date Taking?  Authorizing Provider   amLODIPine (NORVASC) 5 mg tablet TAKE ONE TABLET BY MOUTH EVERY EVENING 8/3/20  Yes Geri Hewitt MD   aspirin 81 MG tablet Take 1 tablet by mouth daily   Yes Historical Provider, MD   atorvastatin (LIPITOR) 20 mg tablet TAKE ONE TABLET BY MOUTH ONCE EVERY DAY 2/17/21  Yes Historical Provider, MD   folic acid (FOLVITE) 912 mcg tablet Take 1 tablet (400 mcg total) by mouth daily 4/12/21  Yes Luis Woods MD   metoprolol tartrate (LOPRESSOR) 25 mg tablet Take 1 tablet (25 mg total) by mouth every 12 (twelve) hours 3/17/21  Yes Geri Hewitt MD   montelukast (SINGULAIR) 10 mg tablet TAKE ONE TABLET BY MOUTH DAILY 6/19/21  Yes Geri Hewitt MD   Multiple Vitamin (multivitamin) tablet Take 1 tablet by mouth daily 1/22/21  Yes Geri Hewitt MD   Omega-3 Fatty Acids (FISH OIL) 1,000 mg Take by mouth daily   Yes Pepe Lewis MD   omeprazole (PriLOSEC) 40 MG capsule TAKE ONE CAPSULE BY MOUTH ONCE EVERY DAY 21  Yes Geri Hewitt MD   senna-docusate sodium (SENOKOT S) 8 6-50 mg per tablet Take 1 tablet by mouth daily at bedtime 21  Yes Juan Antonio Styles MD   sertraline (ZOLOFT) 50 mg tablet TAKE ONE TABLET BY MOUTH EVERY DAY 5/10/21  Yes Geri Hewitt MD   tamsulosin [facility]) 0 4 mg Take 1 capsule (0 4 mg total) by mouth daily with dinner CORRECTED SCRIPT! Note change in SIG and QUANTITY  Please process accordingly  Thank you! (20) 21  Yes Geri Hewitt MD   vitamin B-12 (CYANOCOBALAMIN) 500 MCG TABS Take 1 tablet (500 mcg total) by mouth daily 21 Yes Luis Woods MD   montelukast (SINGULAIR) 10 mg tablet Take 1 tablet (10 mg total) by mouth daily 19   Geri Hewitt MD     I have reviewed home medications with patient personally  Allergies:    Allergies   Allergen Reactions    Codeine Other (See Comments)     agitated    Sulfamethoxazole-Trimethoprim GI Intolerance and Abdominal Pain     upset stomach       Social History:  Marital Status: /Civil Union   Occupation: noncontributory  Patient Pre-hospital Living Situation: Home  Patient Pre-hospital Level of Mobility: walks  Patient Pre-hospital Diet Restrictions: none  Substance Use History:   Social History     Substance and Sexual Activity   Alcohol Use Yes    Alcohol/week: 10 0 standard drinks    Types: 10 Cans of beer per week     Social History     Tobacco Use   Smoking Status Former Smoker    Packs/day: 1 00    Years: 15 00    Pack years: 15 00    Types: Cigarettes    Start date:     Quit date: 1980    Years since quittin 8   Smokeless Tobacco Never Used     Social History     Substance and Sexual Activity   Drug Use Not Currently    Types: Marijuana       Family History:  Family History   Problem Relation Age of Onset    No Known Problems Mother     Heart attack Father     Diabetes Brother     Prostate cancer Brother        Physical Exam:     Vitals:   Blood Pressure: 153/85 (06/25/21 0159)  Pulse: 82 (06/25/21 0159)  Temperature: (!) 102 1 °F (38 9 °C) (06/25/21 0159)  Temp Source: Oral (06/25/21 0027)  Respirations: 16 (06/25/21 0159)  Weight - Scale: 94 kg (207 lb 3 7 oz) (06/24/21 2037)  SpO2: 94 % (06/25/21 0159)    Physical Exam  Vitals reviewed  Constitutional:       General: He is not in acute distress  Appearance: He is obese  He is ill-appearing  He is not toxic-appearing  Comments: Patient is lying in bed, some mild increased work of breathing at rest, however patient claims he is not short of breath this is his baseline  Patient has shaking chills, but is strong enough to move the way ask him to during my physical exam     HENT:      Head: Normocephalic and atraumatic  Nose: Nose normal       Mouth/Throat:      Mouth: Mucous membranes are dry  Eyes:      Extraocular Movements: Extraocular movements intact  Conjunctiva/sclera: Conjunctivae normal    Neck:      Comments: No JVD  Cardiovascular:      Rate and Rhythm: Normal rate and regular rhythm  Pulses: Normal pulses  Heart sounds: Normal heart sounds  Pulmonary:      Breath sounds: No wheezing, rhonchi or rales  Comments: Some increased work of breathing even at rest    Decreased breath sounds throughout  Abdominal:      General: Bowel sounds are normal  There is no distension  Palpations: Abdomen is soft  Tenderness: There is no abdominal tenderness  Genitourinary:     Penis: Normal        Testes: Normal    Musculoskeletal:         General: No swelling or tenderness  Normal range of motion  Cervical back: Neck supple  Skin:     General: Skin is warm and dry  Coloration: Skin is pale  Neurological:      General: No focal deficit present  Mental Status: He is alert and oriented to person, place, and time  Cranial Nerves: No dysarthria or facial asymmetry  Comments: Facial tremors which include winking the right eye and right side of the mouth, patient reports this is not his baseline and due to [employer]   Psychiatric:      Comments: Anxious, repeats himself sometimes  Additional Data:     Lab Results:  Results from last 7 days   Lab Units 06/24/21 2157 06/21/21  0941   WBC Thousand/uL 32 29* 28 87*   HEMOGLOBIN g/dL 10 7* 11 2*   HEMATOCRIT % 31 2* 34 9*   PLATELETS Thousands/uL 160 196   BANDS PCT %  --  1   LYMPHO PCT % 82* 89*   MONO PCT % 0* 1*   EOS PCT % 0 0     Results from last 7 days   Lab Units 06/24/21 2157   SODIUM mmol/L 129*   POTASSIUM mmol/L 3 9   CHLORIDE mmol/L 92*   CO2 mmol/L 29   BUN mg/dL 10   CREATININE mg/dL 0 77   ANION GAP mmol/L 8   CALCIUM mg/dL 8 4   ALBUMIN g/dL 3 5   TOTAL BILIRUBIN mg/dL 0 88   ALK PHOS U/L 83   ALT U/L 43   AST U/L 22   GLUCOSE RANDOM mg/dL 115                 Results from last 7 days   Lab Units 06/24/21  2157   LACTIC ACID mmol/L 0 6       Imaging: Reviewed radiology reports from this admission including: abdominal/pelvic CT  CT abdomen pelvis with contrast   Final Result by Ruby Vasquez DO (06/25 0102)      Moderate circumferential bladder wall thickening with air within the bladder lumen as well as within the wall of the bladder (axial image 79, series 2), suspicious for emphysematous cystitis  Correlation with the patient's symptoms, laboratory values,    and urinalysis recommended  Subtle heterogeneous enhancement of the bilateral kidneys, consider pyelonephritis  No hydronephrosis  Subtle nodular contour of the liver, consider a component of hepatic cirrhosis  Associated splenomegaly, nonspecific  Colonic diverticulosis without evidence of acute diverticulitis, small hiatal hernia, coronary atherosclerosis, and other findings as above  I personally discussed this study with LEANN BOLAND on 6/25/2021 at 1:00 AM                      Workstation performed: [id number]             EKG and Other Studies Reviewed on Admission:   · EKG: No EKG obtained  Will get an EKG now     ** Please Note: This note has been constructed using a voice recognition system   **

## 2021-06-26 LAB
ANION GAP SERPL CALCULATED.3IONS-SCNC: 7 MMOL/L (ref 4–13)
BUN SERPL-MCNC: 8 MG/DL (ref 5–25)
CALCIUM SERPL-MCNC: 7.8 MG/DL (ref 8.3–10.1)
CHLORIDE SERPL-SCNC: 100 MMOL/L (ref 100–108)
CO2 SERPL-SCNC: 26 MMOL/L (ref 21–32)
CREAT SERPL-MCNC: 0.82 MG/DL (ref 0.6–1.3)
ERYTHROCYTE [DISTWIDTH] IN BLOOD BY AUTOMATED COUNT: 15 % (ref 11.6–15.1)
GFR SERPL CREATININE-BSD FRML MDRD: 91 ML/MIN/1.73SQ M
GLUCOSE SERPL-MCNC: 121 MG/DL (ref 65–140)
HCT VFR BLD AUTO: 27.9 % (ref 36.5–49.3)
HGB BLD-MCNC: 9.3 G/DL (ref 12–17)
MCH RBC QN AUTO: 34.8 PG (ref 26.8–34.3)
MCHC RBC AUTO-ENTMCNC: 33.3 G/DL (ref 31.4–37.4)
MCV RBC AUTO: 105 FL (ref 82–98)
PLATELET # BLD AUTO: 153 THOUSANDS/UL (ref 149–390)
PMV BLD AUTO: 9.2 FL (ref 8.9–12.7)
POTASSIUM SERPL-SCNC: 3.6 MMOL/L (ref 3.5–5.3)
PROCALCITONIN SERPL-MCNC: 0.2 NG/ML
RBC # BLD AUTO: 2.67 MILLION/UL (ref 3.88–5.62)
SODIUM SERPL-SCNC: 133 MMOL/L (ref 136–145)
WBC # BLD AUTO: 21.79 THOUSAND/UL (ref 4.31–10.16)

## 2021-06-26 PROCEDURE — 99232 SBSQ HOSP IP/OBS MODERATE 35: CPT | Performed by: INTERNAL MEDICINE

## 2021-06-26 PROCEDURE — 84145 PROCALCITONIN (PCT): CPT | Performed by: PHYSICIAN ASSISTANT

## 2021-06-26 PROCEDURE — 85027 COMPLETE CBC AUTOMATED: CPT | Performed by: INTERNAL MEDICINE

## 2021-06-26 PROCEDURE — 80048 BASIC METABOLIC PNL TOTAL CA: CPT | Performed by: INTERNAL MEDICINE

## 2021-06-26 RX ORDER — LORAZEPAM 0.5 MG/1
0.5 TABLET ORAL 2 TIMES DAILY
Status: DISCONTINUED | OUTPATIENT
Start: 2021-06-26 | End: 2021-06-28 | Stop reason: HOSPADM

## 2021-06-26 RX ORDER — LORAZEPAM 2 MG/ML
0.5 INJECTION INTRAMUSCULAR EVERY 8 HOURS PRN
Status: DISCONTINUED | OUTPATIENT
Start: 2021-06-26 | End: 2021-06-28 | Stop reason: HOSPADM

## 2021-06-26 RX ORDER — SODIUM CHLORIDE 1000 MG
1 TABLET, SOLUBLE MISCELLANEOUS 2 TIMES DAILY WITH MEALS
Status: DISCONTINUED | OUTPATIENT
Start: 2021-06-26 | End: 2021-06-28 | Stop reason: HOSPADM

## 2021-06-26 RX ORDER — LORAZEPAM 2 MG/ML
0.5 INJECTION INTRAMUSCULAR EVERY 6 HOURS PRN
Status: DISCONTINUED | OUTPATIENT
Start: 2021-06-26 | End: 2021-06-26

## 2021-06-26 RX ADMIN — CEFEPIME HYDROCHLORIDE 2000 MG: 2 INJECTION, POWDER, FOR SOLUTION INTRAVENOUS at 13:23

## 2021-06-26 RX ADMIN — OMEGA-3 FATTY ACIDS CAP 1000 MG 1000 MG: 1000 CAP at 08:57

## 2021-06-26 RX ADMIN — SODIUM CHLORIDE TAB 1 GM 1 G: 1 TAB at 16:11

## 2021-06-26 RX ADMIN — ASPIRIN 81 MG: 81 TABLET, CHEWABLE ORAL at 08:57

## 2021-06-26 RX ADMIN — DOCUSATE SODIUM AND SENNOSIDES 1 TABLET: 8.6; 5 TABLET, FILM COATED ORAL at 22:22

## 2021-06-26 RX ADMIN — AMLODIPINE BESYLATE 5 MG: 5 TABLET ORAL at 17:33

## 2021-06-26 RX ADMIN — LORAZEPAM 0.5 MG: 2 INJECTION INTRAMUSCULAR; INTRAVENOUS at 20:38

## 2021-06-26 RX ADMIN — SODIUM CHLORIDE 100 ML/HR: 0.9 INJECTION, SOLUTION INTRAVENOUS at 01:11

## 2021-06-26 RX ADMIN — ENOXAPARIN SODIUM 40 MG: 40 INJECTION SUBCUTANEOUS at 08:59

## 2021-06-26 RX ADMIN — TAMSULOSIN HYDROCHLORIDE 0.4 MG: 0.4 CAPSULE ORAL at 16:11

## 2021-06-26 RX ADMIN — SERTRALINE HYDROCHLORIDE 50 MG: 50 TABLET ORAL at 08:58

## 2021-06-26 RX ADMIN — CYANOCOBALAMIN TAB 500 MCG 500 MCG: 500 TAB at 08:57

## 2021-06-26 RX ADMIN — PANTOPRAZOLE SODIUM 40 MG: 40 TABLET, DELAYED RELEASE ORAL at 05:44

## 2021-06-26 RX ADMIN — MONTELUKAST SODIUM 10 MG: 10 TABLET, COATED ORAL at 08:57

## 2021-06-26 RX ADMIN — METOPROLOL TARTRATE 25 MG: 25 TABLET, FILM COATED ORAL at 08:58

## 2021-06-26 RX ADMIN — ZOLPIDEM TARTRATE 10 MG: 5 TABLET, FILM COATED ORAL at 22:22

## 2021-06-26 RX ADMIN — METOPROLOL TARTRATE 25 MG: 25 TABLET, FILM COATED ORAL at 22:22

## 2021-06-26 RX ADMIN — Medication 400 MCG: at 08:57

## 2021-06-26 RX ADMIN — CEFEPIME HYDROCHLORIDE 2000 MG: 2 INJECTION, POWDER, FOR SOLUTION INTRAVENOUS at 01:08

## 2021-06-26 RX ADMIN — LORAZEPAM 0.5 MG: 0.5 TABLET ORAL at 17:33

## 2021-06-26 RX ADMIN — PREDNISONE 10 MG: 10 TABLET ORAL at 08:57

## 2021-06-26 RX ADMIN — ACETAMINOPHEN 650 MG: 325 TABLET, FILM COATED ORAL at 02:46

## 2021-06-26 RX ADMIN — ATORVASTATIN CALCIUM 20 MG: 20 TABLET, FILM COATED ORAL at 16:11

## 2021-06-26 NOTE — PROGRESS NOTES
Progress Note - Aries Rojas 78 y o  male MRN: [id number]    Unit/Bed#: E5 -01 Encounter: [id number]    Assessment/Plan:    Bacteremia   Gram-negative melanie continue cefepime and monitor culture    UTI    Complicated, patient has a history of BPH and self catheterization, will continue cefepime and closely monitor culture    BPH    Hillman has been placed will continue follow-up with Urology  History of TURP in the past    CLL    continue outpatient follow-up with Oncology continue prednisone    Hyponatremia   appears chronic DC IV fluids and initiate salt tablets      Hypertension   control with amlodipine and metoprolol    Dyslipidemia   continue statin for LDL control    Subjective:   Feels much better, denies chest pain shortness of breath nausea vomiting no fevers chills appetite is okay    Objective:     Vitals: Blood pressure 144/79, pulse 86, temperature 99 3 °F (37 4 °C), temperature source Oral, resp  rate 18, weight 94 kg (207 lb 3 7 oz), SpO2 91 %  ,Body mass index is 31 51 kg/m²        Results from last 7 days   Lab Units 06/26/21  0538   WBC Thousand/uL 21 79*   HEMOGLOBIN g/dL 9 3*   HEMATOCRIT % 27 9*   PLATELETS Thousands/uL 153     Results from last 7 days   Lab Units 06/26/21  0538 06/24/21  2157   POTASSIUM mmol/L 3 6 3 9   CHLORIDE mmol/L 100 92*   CO2 mmol/L 26 29   BUN mg/dL 8 10   CREATININE mg/dL 0 82 0 77   CALCIUM mg/dL 7 8* 8 4   ALK PHOS U/L  --  83   ALT U/L  --  43   AST U/L  --  22       Scheduled Meds:  Current Facility-Administered Medications   Medication Dose Route Frequency Provider Last Rate    acetaminophen  650 mg Oral Q6H PRN Debra Leon PA-C      amLODIPine  5 mg Oral QPM Debra Leon PA-C      aspirin  81 mg Oral Daily Debra Leon PA-C      atorvastatin  20 mg Oral Daily With [facility]IS[facility]      cefepime  2,000 mg Intravenous Q12H Debra Leon PA-C 2,000 mg (06/26/21 0108)    vitamin B-12  500 mcg Oral Daily Debra Leon PA-C      enoxaparin  40 mg Subcutaneous Daily Debra Leon PA-C      fish oil  1,000 mg Oral Daily [place], [place]      folic acid  007 mcg Oral Daily [place], [place]      LORazepam  0 5 mg Oral HS PRN Debra Leon PA-C      metoprolol tartrate  25 mg Oral Q12H [address] [facility] negrete      montelukast  10 mg Oral Daily [place], [place]      ondansetron  4 mg Intravenous Q6H PRN Debra Leon PA-C      pantoprazole  40 mg Oral Early Morning [facility] [place]      predniSONE  10 mg Oral Daily Fede Snyder DO      senna-docusate sodium  1 tablet Oral HS Debra Leon PA-C      sertraline  50 mg Oral Daily Debra Leon PA-C      sodium chloride  100 mL/hr Intravenous Continuous Debra Leon PA-C 100 mL/hr (06/26/21 0111)    tamsulosin  0 4 mg Oral Daily With [place]      zolpidem  10 mg Oral HS PRN eDbra Leon PA-C         Continuous Infusions:  sodium chloride, 100 mL/hr, Last Rate: 100 mL/hr (06/26/21 0111)          Physical exam:  General appearance:  Alert no distress interaction appropriate  Head/Eyes:  Nonicteric PERRL EOMI  Neck:  Supple  Lungs: CTA bilateral no wheezing rhonchi or rales  Heart: normal S1 S2 regular  Abdomen:  Obese nontender with bowel sounds  Extremities: no edema  Skin: no rash    Invasive Devices     Peripheral Intravenous Line            Peripheral IV 06/25/21 Left;Ventral (anterior) Forearm <1 day          Drain            Urethral Catheter 16 Fr  <1 day                  VTE Pharmacologic Prophylaxis:  Lovenox    Counseling / Coordination of Care  Total floor / unit time spent today 30 and / or family counseling and / or coordination of care  A description of the counseling / coordination of care: Josey Slater

## 2021-06-26 NOTE — PLAN OF CARE
Problem: Potential for Falls  Goal: Patient will remain free of falls  Description: INTERVENTIONS:  - Educate patient/family on patient safety including physical limitations  - Instruct patient to call for assistance with activity   - Consult OT/PT to assist with strengthening/mobility   - Keep Call bell within reach  - Keep bed low and locked with side rails adjusted as appropriate  - Keep care items and personal belongings within reach  - Initiate and maintain comfort rounds  - Make Fall Risk Sign visible to staff  - Offer Toileting every  Hours, in advance of need  - Initiate/Maintain alarm  - Obtain necessary fall risk management equipment:   - Apply yellow socks and bracelet for high fall risk patients  - Consider moving patient to room near nurses station  Outcome: Progressing     Problem: PAIN - ADULT  Goal: Verbalizes/displays adequate comfort level or baseline comfort level  Description: Interventions:  - Encourage patient to monitor pain and request assistance  - Assess pain using appropriate pain scale  - Administer analgesics based on type and severity of pain and evaluate response  - Implement non-pharmacological measures as appropriate and evaluate response  - Consider cultural and social influences on pain and pain management  - Notify physician/advanced practitioner if interventions unsuccessful or patient reports new pain  Outcome: Progressing     Problem: INFECTION - ADULT  Goal: Absence or prevention of progression during hospitalization  Description: INTERVENTIONS:  - Assess and monitor for signs and symptoms of infection  - Monitor lab/diagnostic results  - Monitor all insertion sites, i e  indwelling lines, tubes, and drains  - Monitor endotracheal if appropriate and nasal secretions for changes in amount and color  - [place] appropriate cooling/warming therapies per order  - Administer medications as ordered  - Instruct and encourage patient and family to use good hand hygiene technique  - Identify and instruct in appropriate isolation precautions for identified infection/condition  Outcome: Progressing  Goal: Absence of fever/infection during neutropenic period  Description: INTERVENTIONS:  - Monitor WBC    Outcome: Progressing     Problem: SAFETY ADULT  Goal: Patient will remain free of falls  Description: INTERVENTIONS:  - Educate patient/family on patient safety including physical limitations  - Instruct patient to call for assistance with activity   - Consult OT/PT to assist with strengthening/mobility   - Keep Call bell within reach  - Keep bed low and locked with side rails adjusted as appropriate  - Keep care items and personal belongings within reach  - Initiate and maintain comfort rounds  - Make Fall Risk Sign visible to staff  - Offer Toileting every  Hours, in advance of need  - Initiate/Maintain alarm  - Obtain necessary fall risk management equipment:   - Apply yellow socks and bracelet for high fall risk patients  - Consider moving patient to room near nurses station  Outcome: Progressing  Goal: Maintain or return to baseline ADL function  Description: INTERVENTIONS:  -  Assess patient's ability to carry out ADLs; assess patient's baseline for ADL function and identify physical deficits which impact ability to perform ADLs (bathing, care of mouth/teeth, toileting, grooming, dressing, etc )  - Assess/evaluate cause of self-care deficits   - Assess range of motion  - Assess patient's mobility; develop plan if impaired  - Assess patient's need for assistive devices and provide as appropriate  - Encourage maximum independence but intervene and supervise when necessary  - Involve family in performance of ADLs  - Assess for home care needs following discharge   - Consider OT consult to assist with ADL evaluation and planning for discharge  - Provide patient education as appropriate  Outcome: Progressing  Goal: Maintains/Returns to pre admission functional level  Description: INTERVENTIONS:  - Perform BMAT or MOVE assessment daily    - Set and communicate daily mobility goal to care team and patient/family/caregiver  - Collaborate with rehabilitation services on mobility goals if consulted  - Perform Range of Motion  times a day  - Reposition patient every  hours  - Dangle patient  times a day  - Stand patient  times a day  - Ambulate patient  times a day  - Out of bed to chair  times a day   - Out of bed for meals times a day  - Out of bed for toileting  - Record patient progress and toleration of activity level   Outcome: Progressing     Problem: DISCHARGE PLANNING  Goal: Discharge to home or other facility with appropriate resources  Description: INTERVENTIONS:  - Identify barriers to discharge w/patient and caregiver  - Arrange for needed discharge resources and transportation as appropriate  - Identify discharge learning needs (meds, wound care, etc )  - Arrange for interpretive services to assist at discharge as needed  - Refer to Case Management Department for coordinating discharge planning if the patient needs post-hospital services based on physician/advanced practitioner order or complex needs related to functional status, cognitive ability, or social support system  Outcome: Progressing     Problem: Knowledge Deficit  Goal: Patient/family/caregiver demonstrates understanding of disease process, treatment plan, medications, and discharge instructions  Description: Complete learning assessment and assess knowledge base    Interventions:  - Provide teaching at level of understanding  - Provide teaching via preferred learning methods  Outcome: Progressing

## 2021-06-27 ENCOUNTER — TELEPHONE (OUTPATIENT)
Dept: OTHER | Facility: HOSPITAL | Age: 71
End: 2021-06-27

## 2021-06-27 PROBLEM — R78.81 BACTEREMIA: Status: ACTIVE | Noted: 2021-06-27

## 2021-06-27 LAB
ANION GAP SERPL CALCULATED.3IONS-SCNC: 10 MMOL/L (ref 4–13)
BACTERIA BLD CULT: ABNORMAL
BACTERIA BLD CULT: ABNORMAL
BACTERIA UR CULT: ABNORMAL
BACTERIA UR CULT: ABNORMAL
BUN SERPL-MCNC: 10 MG/DL (ref 5–25)
CALCIUM SERPL-MCNC: 8.3 MG/DL (ref 8.3–10.1)
CHLORIDE SERPL-SCNC: 96 MMOL/L (ref 100–108)
CO2 SERPL-SCNC: 26 MMOL/L (ref 21–32)
CREAT SERPL-MCNC: 0.78 MG/DL (ref 0.6–1.3)
ERYTHROCYTE [DISTWIDTH] IN BLOOD BY AUTOMATED COUNT: 15 % (ref 11.6–15.1)
GFR SERPL CREATININE-BSD FRML MDRD: 93 ML/MIN/1.73SQ M
GLUCOSE SERPL-MCNC: 109 MG/DL (ref 65–140)
GRAM STN SPEC: ABNORMAL
GRAM STN SPEC: ABNORMAL
HCT VFR BLD AUTO: 27.7 % (ref 36.5–49.3)
HGB BLD-MCNC: 9.1 G/DL (ref 12–17)
MCH RBC QN AUTO: 33.7 PG (ref 26.8–34.3)
MCHC RBC AUTO-ENTMCNC: 32.9 G/DL (ref 31.4–37.4)
MCV RBC AUTO: 103 FL (ref 82–98)
PLATELET # BLD AUTO: 163 THOUSANDS/UL (ref 149–390)
PMV BLD AUTO: 9.2 FL (ref 8.9–12.7)
POTASSIUM SERPL-SCNC: 3.6 MMOL/L (ref 3.5–5.3)
PROCALCITONIN SERPL-MCNC: 0.1 NG/ML
RBC # BLD AUTO: 2.7 MILLION/UL (ref 3.88–5.62)
SODIUM SERPL-SCNC: 132 MMOL/L (ref 136–145)
WBC # BLD AUTO: 22.97 THOUSAND/UL (ref 4.31–10.16)

## 2021-06-27 PROCEDURE — 85027 COMPLETE CBC AUTOMATED: CPT | Performed by: INTERNAL MEDICINE

## 2021-06-27 PROCEDURE — 80048 BASIC METABOLIC PNL TOTAL CA: CPT | Performed by: INTERNAL MEDICINE

## 2021-06-27 PROCEDURE — 99233 SBSQ HOSP IP/OBS HIGH 50: CPT | Performed by: INTERNAL MEDICINE

## 2021-06-27 PROCEDURE — 87040 BLOOD CULTURE FOR BACTERIA: CPT | Performed by: INTERNAL MEDICINE

## 2021-06-27 PROCEDURE — 84145 PROCALCITONIN (PCT): CPT | Performed by: PHYSICIAN ASSISTANT

## 2021-06-27 RX ADMIN — DEXTROSE 1000 MG: 50 INJECTION, SOLUTION INTRAVENOUS at 14:26

## 2021-06-27 RX ADMIN — ASPIRIN 81 MG: 81 TABLET, CHEWABLE ORAL at 09:05

## 2021-06-27 RX ADMIN — METOPROLOL TARTRATE 25 MG: 25 TABLET, FILM COATED ORAL at 22:35

## 2021-06-27 RX ADMIN — PANTOPRAZOLE SODIUM 40 MG: 40 TABLET, DELAYED RELEASE ORAL at 05:17

## 2021-06-27 RX ADMIN — AMLODIPINE BESYLATE 5 MG: 5 TABLET ORAL at 16:49

## 2021-06-27 RX ADMIN — ZOLPIDEM TARTRATE 10 MG: 5 TABLET, FILM COATED ORAL at 23:58

## 2021-06-27 RX ADMIN — CYANOCOBALAMIN TAB 500 MCG 500 MCG: 500 TAB at 09:05

## 2021-06-27 RX ADMIN — PREDNISONE 10 MG: 10 TABLET ORAL at 09:06

## 2021-06-27 RX ADMIN — LORAZEPAM 0.5 MG: 2 INJECTION INTRAMUSCULAR; INTRAVENOUS at 22:35

## 2021-06-27 RX ADMIN — SODIUM CHLORIDE TAB 1 GM 1 G: 1 TAB at 09:06

## 2021-06-27 RX ADMIN — METOPROLOL TARTRATE 25 MG: 25 TABLET, FILM COATED ORAL at 09:06

## 2021-06-27 RX ADMIN — ENOXAPARIN SODIUM 40 MG: 40 INJECTION SUBCUTANEOUS at 09:06

## 2021-06-27 RX ADMIN — Medication 400 MCG: at 09:05

## 2021-06-27 RX ADMIN — MONTELUKAST SODIUM 10 MG: 10 TABLET, COATED ORAL at 09:06

## 2021-06-27 RX ADMIN — TAMSULOSIN HYDROCHLORIDE 0.4 MG: 0.4 CAPSULE ORAL at 16:49

## 2021-06-27 RX ADMIN — LORAZEPAM 0.5 MG: 0.5 TABLET ORAL at 09:05

## 2021-06-27 RX ADMIN — SERTRALINE HYDROCHLORIDE 50 MG: 50 TABLET ORAL at 09:05

## 2021-06-27 RX ADMIN — ATORVASTATIN CALCIUM 20 MG: 20 TABLET, FILM COATED ORAL at 16:49

## 2021-06-27 RX ADMIN — LORAZEPAM 0.5 MG: 0.5 TABLET ORAL at 16:49

## 2021-06-27 RX ADMIN — SODIUM CHLORIDE TAB 1 GM 1 G: 1 TAB at 16:48

## 2021-06-27 RX ADMIN — DOCUSATE SODIUM AND SENNOSIDES 1 TABLET: 8.6; 5 TABLET, FILM COATED ORAL at 22:35

## 2021-06-27 RX ADMIN — LORAZEPAM 0.5 MG: 2 INJECTION INTRAMUSCULAR; INTRAVENOUS at 14:41

## 2021-06-27 RX ADMIN — OMEGA-3 FATTY ACIDS CAP 1000 MG 1000 MG: 1000 CAP at 09:05

## 2021-06-27 RX ADMIN — CEFEPIME HYDROCHLORIDE 2000 MG: 2 INJECTION, POWDER, FOR SOLUTION INTRAVENOUS at 00:22

## 2021-06-27 NOTE — ASSESSMENT & PLAN NOTE
· Na 129 on admission  · Some element of chronicity in the setting of leukemia  · Suspect poor p o   Intake over the last few days what contributed  · Status post 1 L IV fluid resuscitation, was started on IV fluid hydration at 100 mL/hr  · Last echo in January 2021 shows EF 60%   · Switched to salt tablets yesterday  · Na 132 today

## 2021-06-27 NOTE — ASSESSMENT & PLAN NOTE
· Presented with fever and malaise for the last 2 days, nausea, poor p o  Intake  · Procalcitonin is pending  · UA shows innumerable bacteria and innumerable white blood cells, urine cultures pending  · CT abd & pelvis -   1 ) Moderate circumferential bladder wall thickening with air within the bladder lumen as well as within the wall of the bladder suspicious for emphysematous cystitis  Correlation with the patient's symptoms, laboratory values, and urinalysis recommended  2) Subtle heterogeneous enhancement of the bilateral kidneys, consider pyelonephritis  No hydronephrosis  3) Subtle nodular contour of the liver, consider a component of hepatic cirrhosis  Associated splenomegaly, nonspecific  4) Colonic diverticulosis without evidence of acute diverticulitis, small hiatal hernia, coronary atherosclerosis, and other findings   · Does have a history of pseudomonal UTI which was susceptible to cefepime    This occurred may 2021  · Urine culture with Ecoli sensitive to cephalosporins  · Will switch cefepime to ceftriaxone  · Given possible pyelonephritis, will need a longer period of antibiotic treatment

## 2021-06-27 NOTE — TELEPHONE ENCOUNTER
Alejandro Palomino is a 78year old male with history of urinary retention who performs CIC at home twice daily however more recently has been increased to three times  Currently admitted with UTI and still requiring antibiotics  Patient should be discharged home with wolfe catheter and outpatient follow up with our group for long term management options

## 2021-06-27 NOTE — PROGRESS NOTES
[facility]  Progress Note Cheryl Fitting 1950, 78 y o  male MRN: [id number]  Unit/Bed#: E5 -01 Encounter: [id number]  Primary Care Provider: Faustina Miller MD   Date and time admitted to hospital: 6/24/2021  8:34 PM    H/o COVID-19  Assessment & Plan  · Was hospitalized for COVID-19 in January 2021, did have to go to the ICU at 1 point, requiring intubation  · This history is noted simply in case patient has positive COVID screening, his infection was months ago  Hyponatremia  Assessment & Plan  · Na 129 on admission  · Some element of chronicity in the setting of leukemia  · Suspect poor p o  Intake over the last few days what contributed  · Status post 1 L IV fluid resuscitation, was started on IV fluid hydration at 100 mL/hr  · Last echo in January 2021 shows EF 60%   · Switched to salt tablets yesterday  · Na 132 today    BPH with obstruction/lower urinary tract symptoms  Assessment & Plan  · Requires self catheterization twice daily, will continue this regimen while inpatient  · Urinary retention protocol  · Urology consultation has been placed    Insomnia  Assessment & Plan  · Takes Ambien and Ativan at home  · PDMP has been reviewed    CLL (chronic lymphocytic leukemia) ([facility] [address] )  Assessment & Plan  · Diagnosis in 2009 following with Oncology  · Recently underwent prednisone taper from chronic prednisone use  · WBC elevated and predominately lymphocytic which coincides with CLL  · Has splenomegaly as well  · Needs outpatient follow up with heme/onc  · Continue prednisone 10mg daily    Essential hypertension  Assessment & Plan  · Continue amlodipine from home  · Blood pressure is well controlled 145/70 in the ED    * Urinary tract infection associated with catheterization of urinary tract ([facility] [address] )  Assessment & Plan  · Presented with fever and malaise for the last 2 days, nausea, poor p o   Intake  · Procalcitonin is pending  · UA shows innumerable bacteria and innumerable white blood cells, urine cultures pending  · CT abd & pelvis -   1 ) Moderate circumferential bladder wall thickening with air within the bladder lumen as well as within the wall of the bladder suspicious for emphysematous cystitis  Correlation with the patient's symptoms, laboratory values, and urinalysis recommended  2) Subtle heterogeneous enhancement of the bilateral kidneys, consider pyelonephritis  No hydronephrosis  3) Subtle nodular contour of the liver, consider a component of hepatic cirrhosis  Associated splenomegaly, nonspecific  4) Colonic diverticulosis without evidence of acute diverticulitis, small hiatal hernia, coronary atherosclerosis, and other findings   · Does have a history of pseudomonal UTI which was susceptible to cefepime  This occurred may 2021  · Urine culture with Ecoli sensitive to cephalosporins  · Will switch cefepime to ceftriaxone  · Given possible pyelonephritis, will need a longer period of antibiotic treatment      VTE Pharmacologic Prophylaxis:   Pharmacologic: Enoxaparin (Lovenox)  Mechanical VTE Prophylaxis in Place: Yes    Patient Centered Rounds: I have performed bedside rounds with nursing staff today  Discussions with Specialists or Other Care Team Provider: karen    Education and Discussions with Family / Patient: patient    Time Spent for Care: 30 minutes  More than 50% of total time spent on counseling and coordination of care as described above      Current Length of Stay: 2 day(s)    Current Patient Status: Inpatient   Certification Statement: The patient will continue to require additional inpatient hospital stay due to need for repeat blood cultures and treatment of complicated UTI    Discharge Plan: pending new blood cultures    Code Status: Level 1 - Full Code      Subjective:   Feels better today, no acute complaints and no acute events overnight    Objective:     Vitals:   Temp (24hrs), Av 2 °F (31 2 °C), Min:37 4 °F (3 °C), Max:99 3 °F (37 4 °C)    Temp:  [37 4 °F (3 °C)-99 3 °F (37 4 °C)] 97 7 °F (36 5 °C)  HR:  [72-86] 86  Resp:  [16-18] 18  BP: (144-157)/(79-83) 144/83  SpO2:  [92 %-96 %] 93 %  Body mass index is 31 51 kg/m²  Input and Output Summary (last 24 hours): Intake/Output Summary (Last 24 hours) at 6/27/2021 1334  Last data filed at 6/27/2021 1255  Gross per 24 hour   Intake 650 ml   Output 1700 ml   Net -1050 ml       Physical Exam:     Physical Exam  Constitutional:       General: He is not in acute distress  Appearance: He is not diaphoretic  HENT:      Head: Normocephalic and atraumatic  Nose: Nose normal       Mouth/Throat:      Pharynx: No oropharyngeal exudate  Eyes:      General: No scleral icterus  Conjunctiva/sclera: Conjunctivae normal       Pupils: Pupils are equal, round, and reactive to light  Neck:      Thyroid: No thyromegaly  Vascular: No JVD  Trachea: No tracheal deviation  Cardiovascular:      Rate and Rhythm: Normal rate and regular rhythm  Heart sounds: Normal heart sounds  No murmur heard  No friction rub  No gallop  Pulmonary:      Effort: Pulmonary effort is normal  No respiratory distress  Breath sounds: Normal breath sounds  No stridor  No wheezing or rales  Abdominal:      General: Bowel sounds are normal  There is no distension  Palpations: Abdomen is soft  Tenderness: There is no abdominal tenderness  There is no guarding or rebound  Musculoskeletal:         General: No deformity  Normal range of motion  Cervical back: Normal range of motion and neck supple  Lymphadenopathy:      Cervical: No cervical adenopathy  Skin:     General: Skin is warm  Findings: No erythema or rash  Neurological:      Mental Status: He is alert and oriented to person, place, and time  Cranial Nerves: No cranial nerve deficit  Sensory: No sensory deficit         Additional Data:     Labs:    Results from last 7 days   Lab Units 06/27/21  0520 06/24/21 2157 06/21/21  0941   WBC Thousand/uL 22 97* 32 29* 28 87*   HEMOGLOBIN g/dL 9 1* 10 7* 11 2*   HEMATOCRIT % 27 7* 31 2* 34 9*   PLATELETS Thousands/uL 163 160 196   BANDS PCT %  --   --  1   LYMPHO PCT %  --  82* 89*   MONO PCT %  --  0* 1*   EOS PCT %  --  0 0     Results from last 7 days   Lab Units 06/27/21  0520 06/24/21 2157   SODIUM mmol/L 132* 129*   POTASSIUM mmol/L 3 6 3 9   CHLORIDE mmol/L 96* 92*   CO2 mmol/L 26 29   BUN mg/dL 10 10   CREATININE mg/dL 0 78 0 77   ANION GAP mmol/L 10 8   CALCIUM mg/dL 8 3 8 4   ALBUMIN g/dL  --  3 5   TOTAL BILIRUBIN mg/dL  --  0 88   ALK PHOS U/L  --  83   ALT U/L  --  43   AST U/L  --  22   GLUCOSE RANDOM mg/dL 109 115                 Results from last 7 days   Lab Units 06/27/21 0520 06/26/21  0538 06/24/21 2157   LACTIC ACID mmol/L  --   --  0 6   PROCALCITONIN ng/ml 0 10 0 20  --            * I Have Reviewed All Lab Data Listed Above  * Additional Pertinent Lab Tests Reviewed:  [address] Admission Reviewed    Imaging:    Imaging Reports Reviewed Today Include: CT abdomen  Imaging Personally Reviewed by Myself Includes:  CT abdomen    Recent Cultures (last 7 days):     Results from last 7 days   Lab Units 06/24/21 2223 06/24/21 2157   BLOOD CULTURE   --  Escherichia coli*  Escherichia coli*   GRAM STAIN RESULT   --  Gram negative rods*  Gram negative rods*   URINE CULTURE  >100,000 cfu/ml Escherichia coli*  >100,000 cfu/ml Escherichia coli*  --        Last 24 Hours Medication List:   Current Facility-Administered Medications   Medication Dose Route Frequency Provider Last Rate    acetaminophen  650 mg Oral Q6H PRN Debra Leon PA-C      amLODIPine  5 mg Oral QPM Debra Leon PA-C      aspirin  81 mg Oral Daily Debra Leon PA-C      atorvastatin  20 mg Oral Daily With [facility] PriALISHA paz      cefTRIAXone  1,000 mg Intravenous Q24H Jessica Yun MD      vitamin B-12  500 mcg Oral Daily Debra Leon PA-C      enoxaparin  40 mg Subcutaneous Daily Debra Villegascaden, ALISHA      fish oil  1,000 mg Oral Daily [place], [place]      folic acid  685 mcg Oral Daily [place]      LORazepam  0 5 mg Intravenous Q8H PRN MATHEW Murphy      LORazepam  0 5 mg Oral BID Jan Apt, ALISHA      metoprolol tartrate  25 mg Oral Q12H [facility] Debra Nellyo, ALISHA      montelukast  10 mg Oral Daily Debra Demo, ALISHA      ondansetron  4 mg Intravenous Q6H PRN [facility], ALISHA      pantoprazole  40 mg Oral Early Morning [facility], ALISHA      predniSONE  10 mg Oral Daily Fede Snyder DO      senna-docusate sodium  1 tablet Oral HS [facility], ALISHA      sertraline  50 mg Oral Daily [facility], ALISHA      sodium chloride  1 g Oral BID With [facility], DO      tamsulosin  0 4 mg Oral Daily With [employer], ALISHA      zolpidem  10 mg Oral HS PRN Sabino Momin PA-C          Today, Patient Was Seen By: Aliya Castro MD    ** Please Note: Dictation voice to text software may have been used in the creation of this document   **

## 2021-06-27 NOTE — ASSESSMENT & PLAN NOTE
· Diagnosis in 2009 following with Oncology  · Recently underwent prednisone taper from chronic prednisone use  · WBC elevated and predominately lymphocytic which coincides with CLL  · Has splenomegaly as well  · Needs outpatient follow up with heme/onc  · Continue prednisone 10mg daily

## 2021-06-27 NOTE — QUICK NOTE
Skyler Cooley is a 78year old male with history of urinary retention who performs CIC at home twice daily however more recently has been increased to three times  Currently admitted with UTI and still requiring antibiotics  Patient should be discharged home with wolfe catheter and outpatient follow up with our group for long term management options  Our office will reach out for scheduling patient  Will sign off, please do not hesitate to reach back out for further questions or concerns

## 2021-06-28 ENCOUNTER — TELEPHONE (OUTPATIENT)
Dept: UROLOGY | Facility: MEDICAL CENTER | Age: 71
End: 2021-06-28

## 2021-06-28 VITALS
WEIGHT: 207.23 LBS | SYSTOLIC BLOOD PRESSURE: 159 MMHG | TEMPERATURE: 97.9 F | BODY MASS INDEX: 31.41 KG/M2 | RESPIRATION RATE: 18 BRPM | HEART RATE: 88 BPM | DIASTOLIC BLOOD PRESSURE: 91 MMHG | HEIGHT: 68 IN | OXYGEN SATURATION: 94 %

## 2021-06-28 PROBLEM — B96.20 E COLI BACTEREMIA: Status: ACTIVE | Noted: 2021-06-27

## 2021-06-28 LAB
ANION GAP SERPL CALCULATED.3IONS-SCNC: 10 MMOL/L (ref 4–13)
BUN SERPL-MCNC: 9 MG/DL (ref 5–25)
CALCIUM SERPL-MCNC: 8.5 MG/DL (ref 8.3–10.1)
CHLORIDE SERPL-SCNC: 99 MMOL/L (ref 100–108)
CO2 SERPL-SCNC: 27 MMOL/L (ref 21–32)
CREAT SERPL-MCNC: 0.79 MG/DL (ref 0.6–1.3)
ERYTHROCYTE [DISTWIDTH] IN BLOOD BY AUTOMATED COUNT: 14.9 % (ref 11.6–15.1)
GFR SERPL CREATININE-BSD FRML MDRD: 92 ML/MIN/1.73SQ M
GLUCOSE SERPL-MCNC: 131 MG/DL (ref 65–140)
HCT VFR BLD AUTO: 28.9 % (ref 36.5–49.3)
HGB BLD-MCNC: 9.4 G/DL (ref 12–17)
MCH RBC QN AUTO: 33.5 PG (ref 26.8–34.3)
MCHC RBC AUTO-ENTMCNC: 32.5 G/DL (ref 31.4–37.4)
MCV RBC AUTO: 103 FL (ref 82–98)
PLATELET # BLD AUTO: 200 THOUSANDS/UL (ref 149–390)
PMV BLD AUTO: 9.2 FL (ref 8.9–12.7)
POTASSIUM SERPL-SCNC: 3.8 MMOL/L (ref 3.5–5.3)
RBC # BLD AUTO: 2.81 MILLION/UL (ref 3.88–5.62)
SODIUM SERPL-SCNC: 136 MMOL/L (ref 136–145)
WBC # BLD AUTO: 22.22 THOUSAND/UL (ref 4.31–10.16)

## 2021-06-28 PROCEDURE — 80048 BASIC METABOLIC PNL TOTAL CA: CPT | Performed by: INTERNAL MEDICINE

## 2021-06-28 PROCEDURE — 85027 COMPLETE CBC AUTOMATED: CPT | Performed by: INTERNAL MEDICINE

## 2021-06-28 PROCEDURE — 99239 HOSP IP/OBS DSCHRG MGMT >30: CPT | Performed by: INTERNAL MEDICINE

## 2021-06-28 RX ORDER — SODIUM CHLORIDE 1000 MG
1 TABLET, SOLUBLE MISCELLANEOUS 2 TIMES DAILY WITH MEALS
Qty: 10 TABLET | Refills: 0 | Status: SHIPPED | OUTPATIENT
Start: 2021-06-28 | End: 2021-07-07 | Stop reason: ALTCHOICE

## 2021-06-28 RX ORDER — PREDNISONE 10 MG/1
10 TABLET ORAL DAILY
Qty: 30 TABLET | Refills: 0 | Status: SHIPPED | OUTPATIENT
Start: 2021-06-29 | End: 2021-08-02 | Stop reason: ALTCHOICE

## 2021-06-28 RX ORDER — CEPHALEXIN 500 MG/1
500 CAPSULE ORAL EVERY 6 HOURS SCHEDULED
Qty: 24 CAPSULE | Refills: 0 | Status: SHIPPED | OUTPATIENT
Start: 2021-06-28 | End: 2021-07-04

## 2021-06-28 RX ADMIN — LORAZEPAM 0.5 MG: 0.5 TABLET ORAL at 10:07

## 2021-06-28 RX ADMIN — OMEGA-3 FATTY ACIDS CAP 1000 MG 1000 MG: 1000 CAP at 10:07

## 2021-06-28 RX ADMIN — Medication 400 MCG: at 10:06

## 2021-06-28 RX ADMIN — SODIUM CHLORIDE TAB 1 GM 1 G: 1 TAB at 10:05

## 2021-06-28 RX ADMIN — CYANOCOBALAMIN TAB 500 MCG 500 MCG: 500 TAB at 10:08

## 2021-06-28 RX ADMIN — METOPROLOL TARTRATE 25 MG: 25 TABLET, FILM COATED ORAL at 10:09

## 2021-06-28 RX ADMIN — MONTELUKAST SODIUM 10 MG: 10 TABLET, COATED ORAL at 10:09

## 2021-06-28 RX ADMIN — PANTOPRAZOLE SODIUM 40 MG: 40 TABLET, DELAYED RELEASE ORAL at 05:35

## 2021-06-28 RX ADMIN — SERTRALINE HYDROCHLORIDE 50 MG: 50 TABLET ORAL at 10:06

## 2021-06-28 RX ADMIN — PREDNISONE 10 MG: 10 TABLET ORAL at 10:10

## 2021-06-28 RX ADMIN — ASPIRIN 81 MG: 81 TABLET, CHEWABLE ORAL at 10:05

## 2021-06-28 NOTE — ASSESSMENT & PLAN NOTE
· Na 129 on admission  Improved to 136 on discharge  · Some element of chronicity in the setting of leukemia  · Suspect poor p o  Intake over the last few days what contributed  · Status post 1 L IV fluid resuscitation, was started on IV fluid hydration at 100 mL/hr  IV fluids have subsequently been discontinued

## 2021-06-28 NOTE — TELEPHONE ENCOUNTER
Spoke with patient and scheduled him for cath removal on Friday 7/2/2021 @ 0900 in the [facility] office, as he requested to have catheter removed prior to 7/4/2021  He is aware he may resume spontaneous voiding with CIC TID post Hillman removal  Also aware of keeping office appointment on 8/20/2021

## 2021-06-28 NOTE — ASSESSMENT & PLAN NOTE
Patient admitted with urinary tract infection and 2 x 2 blood cultures positive with E coli  Urine with same organism  Repeat blood cultures have been negative to date  He remains afebrile hemodynamically stable  Stable for transition to oral antibiotics to complete total of 10 days of treatment

## 2021-06-28 NOTE — TELEPHONE ENCOUNTER
Patient calling for hospital follow up appointment    Patient of Dr Pati Riley seen in [address]

## 2021-06-28 NOTE — TELEPHONE ENCOUNTER
Hillman catheter can be removed in 1 week and he can resume spontaneous void with CIC 3x a day   Keep 8/20 appt

## 2021-06-28 NOTE — DISCHARGE SUMMARY
[facility]  Discharge- La Nena Orozco 1950, 78 y o  male MRN: [id number]  Unit/Bed#: E5 -01 Encounter: [id number]  Primary Care Provider: Leona Scherer MD   Date and time admitted to hospital: 6/24/2021  8:34 PM  Patient's UTI likely secondary to pyelonephritis with bacteremia present on admission  E coli bacteremia  Assessment & Plan  Patient admitted with urinary tract infection and 2 x 2 blood cultures positive with E coli  Urine with same organism  Repeat blood cultures have been negative to date  He remains afebrile hemodynamically stable  Stable for transition to oral antibiotics to complete total of 10 days of treatment  * Urinary tract infection associated with catheterization of urinary tract ([facility] [address] )  Assessment & Plan  · Presented with fever and malaise for the last 2 days, nausea, poor p o  Intake  · Procalcitonin is pending  · UA shows innumerable bacteria and innumerable white blood cells, urine cultures pending  · CT abd & pelvis -   1 ) Moderate circumferential bladder wall thickening with air within the bladder lumen as well as within the wall of the bladder suspicious for emphysematous cystitis  Correlation with the patient's symptoms, laboratory values, and urinalysis recommended  2) Subtle heterogeneous enhancement of the bilateral kidneys, consider pyelonephritis  No hydronephrosis  3) Subtle nodular contour of the liver, consider a component of hepatic cirrhosis  Associated splenomegaly, nonspecific  4) Colonic diverticulosis without evidence of acute diverticulitis, small hiatal hernia, coronary atherosclerosis, and other findings   · Does have a history of pseudomonal UTI which was susceptible to cefepime    This occurred may 2021  · Urine culture with Ecoli sensitive to cephalosporins  · Will switch ceftriaxone to Keflex to complete total of 10 days of treatment in view ofbacteremia present on admission  · Outpatient Urology follow-up on discharge    H/o COVID-19  Assessment & Plan  · Was hospitalized for COVID-19 in January 2021, did have to go to the ICU at 1 point, requiring intubation  · This history is noted simply in case patient has positive COVID screening, his infection was months ago  Hyponatremia  Assessment & Plan  · Na 129 on admission  Improved to 136 on discharge  · Some element of chronicity in the setting of leukemia  · Suspect poor p o  Intake over the last few days what contributed  · Status post 1 L IV fluid resuscitation, was started on IV fluid hydration at 100 mL/hr  IV fluids have subsequently been discontinued  BPH with obstruction/lower urinary tract symptoms  Assessment & Plan  · Requires self catheterization twice daily,   · Urinary retention protocol since admission  · Had Hillman catheter placed secondary to urinary retention  · Seen by Urology and recommended discharge with Hillmna catheter to complete treatment of urinary tract infection and outpatient follow-up with primary urologist Dr Lillian Matthews    · Catheter care discussed with patient prior to discharge  · Outpatient Urology follow-up for catheter removal as an outpatient    CLL (chronic lymphocytic leukemia) ([facility] [address] )  Assessment & Plan  · Diagnosis in 2009 following with Oncology  · Recently underwent prednisone taper from chronic prednisone use  · WBC elevated and predominately lymphocytic which coincides with CLL  · Has splenomegaly as well  · Needs outpatient follow up with heme/onc  · Continue prednisone 10mg daily    Essential hypertension  Assessment & Plan  · Continue amlodipine from home  · Blood pressure is well controlled 145/70 in the ED    Medical Problems     Resolved Problems  Date Reviewed: 6/27/2021    None              Discharging Physician / Practitioner: Azell Lundborg, MD  PCP: Kirsten Mitchell MD  Admission Date:   Admission Orders (From admission, onward)     Ordered        06/25/21 0115  Inpatient Admission  Once Discharge Date: 06/28/21    Consultations During Hospital Stay:  · Urology    Procedures Performed:   CT abdomen: Moderate circumferential bladder wall thickening with air within the bladder lumen as well as within the wall of the bladder (axial image 79, series 2), suspicious for emphysematous cystitis  Correlation with the patient's symptoms, laboratory values,   and urinalysis recommended      · Subtle heterogeneous enhancement of the bilateral kidneys, consider pyelonephritis  No hydronephrosis  Significant Findings / Test Results:   · See above    Incidental Findings:   · See above    Test Results Pending at Discharge (will require follow up): · Blood cultures repeat from 6/27-to date     Outpatient Tests Requested:  · Urology follow-up    Complications:  None  Reason for Admission:  Fever    Hospital Course:   Heath Bush is a 78 y o  male patient who originally presented to the hospital on 6/24/2021 due to fever, nausea, poor oral intake  CT scan of the abdomen pelvis was suspicious for emphysematous cystitis with pyelonephritis  Urinalysis showed innumerable bacteria and white cells  Blood and urine cultures were sent and he was started on broad-spectrum antibiotic  His blood culture and urine culture both grew E coli which was pansensitive and antibiotic was subsequently adjusted  Repeat blood cultures have been negative to date and patient had remained afebrile  He was subsequently switched to oral antibiotics to complete total of 10 days of treatment  Of note he had history of BPH with urinary retention for which he was intermittently self catheterizing at home prior to admission  Subsequently had urinary retention requiring Hillman catheter placement  Urology recommended discharge with a Hillman catheter and completion of course of antibiotics and subsequent follow-up with urologist as an outpatient  Patient was discharged with a Hillman catheter and VNA set up      Please see above list of diagnoses and related plan for additional information  Condition at Discharge: stable    Discharge Day Visit / Exam:   Subjective patient seen and examined  Alcira Cummings for discharge home  Denies any complaints  Remains afebrile  Vitals: Blood Pressure: 159/91 (06/28/21 0755)  Pulse: 88 (06/28/21 0755)  Temperature: 97 9 °F (36 6 °C) (06/28/21 0755)  Temp Source: Oral (06/27/21 1608)  Respirations: 18 (06/28/21 0755)  Height: 5' 8" (172 7 cm) (06/26/21 1300)  Weight - Scale: 94 kg (207 lb 3 7 oz) (06/26/21 1300)  SpO2: 94 % (06/28/21 0755)  Exam:   Physical Exam  Constitutional:       Appearance: He is obese  HENT:      Head: Normocephalic  Nose: Nose normal       Mouth/Throat:      Mouth: Mucous membranes are moist    Eyes:      Extraocular Movements: Extraocular movements intact  Pupils: Pupils are equal, round, and reactive to light  Cardiovascular:      Rate and Rhythm: Normal rate and regular rhythm  Pulses: Normal pulses  Pulmonary:      Effort: Pulmonary effort is normal       Breath sounds: Normal breath sounds  Abdominal:      General: There is distension  Palpations: Abdomen is soft  There is mass  Tenderness: There is no abdominal tenderness  Comments: Splenomegaly   Genitourinary:     Comments: Indwelling Hillman catheter without hematuria  Musculoskeletal:         General: No swelling or tenderness  Cervical back: Neck supple  Right lower leg: No edema  Skin:     General: Skin is warm  Neurological:      General: No focal deficit present  Mental Status: He is alert and oriented to person, place, and time  Mental status is at baseline  Psychiatric:         Mood and Affect: Mood normal          Discussion with Family: Patient declined call to   Discharge instructions/Information to patient and family:   See after visit summary for information provided to patient and family        Provisions for Follow-Up Care:  See after visit summary for information related to follow-up care and any pertinent home health orders  Disposition:   Home    Planned Readmission: No     Discharge Statement:  I [id number] discharging the patient  This time was spent on the day of discharge  I had direct contact with the patient on the day of discharge  Greater than 50% of the total time was spent examining patient, answering all patient questions, arranging and discussing plan of care with patient as well as directly providing post-discharge instructions  Additional time then spent on discharge activities  Discharge Medications:  See after visit summary for reconciled discharge medications provided to patient and/or family        **Please Note: This note may have been constructed using a voice recognition system**

## 2021-06-28 NOTE — ASSESSMENT & PLAN NOTE
· Presented with fever and malaise for the last 2 days, nausea, poor p o  Intake  · Procalcitonin is pending  · UA shows innumerable bacteria and innumerable white blood cells, urine cultures pending  · CT abd & pelvis -   1 ) Moderate circumferential bladder wall thickening with air within the bladder lumen as well as within the wall of the bladder suspicious for emphysematous cystitis  Correlation with the patient's symptoms, laboratory values, and urinalysis recommended  2) Subtle heterogeneous enhancement of the bilateral kidneys, consider pyelonephritis  No hydronephrosis  3) Subtle nodular contour of the liver, consider a component of hepatic cirrhosis  Associated splenomegaly, nonspecific  4) Colonic diverticulosis without evidence of acute diverticulitis, small hiatal hernia, coronary atherosclerosis, and other findings   · Does have a history of pseudomonal UTI which was susceptible to cefepime    This occurred may 2021  · Urine culture with Ecoli sensitive to cephalosporins  · Will switch ceftriaxone to Keflex to complete total of 10 days of treatment in view ofbacteremia present on admission  · Outpatient Urology follow-up on discharge

## 2021-06-28 NOTE — ASSESSMENT & PLAN NOTE
· Requires self catheterization twice daily,   · Urinary retention protocol since admission  · Had Hillman catheter placed secondary to urinary retention  · Seen by Urology and recommended discharge with Hillman catheter to complete treatment of urinary tract infection and outpatient follow-up with primary urologist Dr Wanda Peña    · Catheter care discussed with patient prior to discharge  · Outpatient Urology follow-up for catheter removal as an outpatient

## 2021-06-28 NOTE — TELEPHONE ENCOUNTER
Please advise on follow up      Should we set pt up for void trail  Pt has follow up scheduled with [facility] on 8/21

## 2021-06-28 NOTE — CASE MANAGEMENT
CM attempted assessment and offered home assistance w/ [employer]- patient refused and states " No I don't need nothing "

## 2021-06-30 ENCOUNTER — TRANSITIONAL CARE MANAGEMENT (OUTPATIENT)
Dept: FAMILY MEDICINE CLINIC | Facility: CLINIC | Age: 71
End: 2021-06-30

## 2021-07-02 ENCOUNTER — CLINICAL SUPPORT (OUTPATIENT)
Dept: UROLOGY | Facility: CLINIC | Age: 71
End: 2021-07-02
Payer: MEDICARE

## 2021-07-02 VITALS
DIASTOLIC BLOOD PRESSURE: 70 MMHG | RESPIRATION RATE: 20 BRPM | BODY MASS INDEX: 31.07 KG/M2 | HEART RATE: 80 BPM | SYSTOLIC BLOOD PRESSURE: 130 MMHG | HEIGHT: 68 IN | WEIGHT: 205 LBS

## 2021-07-02 DIAGNOSIS — N39.0 URINARY TRACT INFECTION ASSOCIATED WITH CATHETERIZATION OF URINARY TRACT, UNSPECIFIED INDWELLING URINARY CATHETER TYPE, INITIAL ENCOUNTER (HCC): Primary | ICD-10-CM

## 2021-07-02 DIAGNOSIS — T83.511A URINARY TRACT INFECTION ASSOCIATED WITH CATHETERIZATION OF URINARY TRACT, UNSPECIFIED INDWELLING URINARY CATHETER TYPE, INITIAL ENCOUNTER (HCC): Primary | ICD-10-CM

## 2021-07-02 LAB
BACTERIA BLD CULT: NORMAL
BACTERIA BLD CULT: NORMAL

## 2021-07-02 PROCEDURE — 99211 OFF/OP EST MAY X REQ PHY/QHP: CPT

## 2021-07-02 NOTE — PROGRESS NOTES
7/2/2021    Richa Cohn is a 78 y o  male  [id number]    Diagnosis:  Chief Complaint     Urinary Tract Infection; Wolfe Removal          Patient presents for wolfe removal managed by  Dr Yehuda Larsen:  Patient to keep scheduled appointment for 08/20/2021 with Dr Anyi Gipson    Procedure: Wolfe removed without incident after deflation of intact balloon  Patient tolerated procedure well     Instructed patient to perform CIC TID but patient corrected myself stating " Dr Anyi Gipson said BID once in the morning and once in the evening"    Vitals:    07/02/21 0855   BP: 130/70   Pulse: 80   Resp: 20   Weight: 93 kg (205 lb)   Height: 5' 8" (1 727 m)         Edda Flores RN

## 2021-07-05 ENCOUNTER — TELEPHONE (OUTPATIENT)
Dept: OTHER | Facility: OTHER | Age: 71
End: 2021-07-05

## 2021-07-05 ENCOUNTER — APPOINTMENT (OUTPATIENT)
Dept: LAB | Facility: MEDICAL CENTER | Age: 71
End: 2021-07-05
Payer: MEDICARE

## 2021-07-05 NOTE — TELEPHONE ENCOUNTER
Lab Result: WBC 38 13   Date/Time Drawn: 07-05-21@ 0849   Ordering Provider: Dr Cy Bhatt Name: Cat O       The following critical/stat result was read back to the lab as stated above and [employer] to the on-call provider

## 2021-07-07 ENCOUNTER — OFFICE VISIT (OUTPATIENT)
Dept: FAMILY MEDICINE CLINIC | Facility: CLINIC | Age: 71
End: 2021-07-07

## 2021-07-07 ENCOUNTER — TELEPHONE (OUTPATIENT)
Dept: UROLOGY | Facility: MEDICAL CENTER | Age: 71
End: 2021-07-07

## 2021-07-07 VITALS
DIASTOLIC BLOOD PRESSURE: 70 MMHG | WEIGHT: 203 LBS | SYSTOLIC BLOOD PRESSURE: 160 MMHG | TEMPERATURE: 97.8 F | HEART RATE: 85 BPM | OXYGEN SATURATION: 95 % | HEIGHT: 68 IN | BODY MASS INDEX: 30.77 KG/M2

## 2021-07-07 DIAGNOSIS — R73.9 HYPERGLYCEMIA: ICD-10-CM

## 2021-07-07 DIAGNOSIS — B96.20 E COLI BACTEREMIA: ICD-10-CM

## 2021-07-07 DIAGNOSIS — C91.10 CLL (CHRONIC LYMPHOCYTIC LEUKEMIA) (HCC): Chronic | ICD-10-CM

## 2021-07-07 DIAGNOSIS — I10 ESSENTIAL HYPERTENSION: Primary | Chronic | ICD-10-CM

## 2021-07-07 DIAGNOSIS — I49.5 SINUS BRADY-TACHY SYNDROME (HCC): ICD-10-CM

## 2021-07-07 DIAGNOSIS — M79.10 MYALGIA: ICD-10-CM

## 2021-07-07 DIAGNOSIS — Z86.39 H/O VITAMIN D DEFICIENCY: ICD-10-CM

## 2021-07-07 DIAGNOSIS — K21.9 GASTROESOPHAGEAL REFLUX DISEASE WITHOUT ESOPHAGITIS: Chronic | ICD-10-CM

## 2021-07-07 DIAGNOSIS — N39.0 URINARY TRACT INFECTION ASSOCIATED WITH CATHETERIZATION OF URINARY TRACT, UNSPECIFIED INDWELLING URINARY CATHETER TYPE, SUBSEQUENT ENCOUNTER: ICD-10-CM

## 2021-07-07 DIAGNOSIS — K31.819 GAVE (GASTRIC ANTRAL VASCULAR ECTASIA): ICD-10-CM

## 2021-07-07 DIAGNOSIS — R78.81 E COLI BACTEREMIA: ICD-10-CM

## 2021-07-07 DIAGNOSIS — E87.1 HYPONATREMIA: ICD-10-CM

## 2021-07-07 DIAGNOSIS — D59.19 OTHER AUTOIMMUNE HEMOLYTIC ANEMIA (HCC): ICD-10-CM

## 2021-07-07 DIAGNOSIS — D59.10 AUTOIMMUNE HEMOLYTIC ANEMIA (HCC): ICD-10-CM

## 2021-07-07 DIAGNOSIS — I27.20 PULMONARY HYPERTENSION (HCC): Chronic | ICD-10-CM

## 2021-07-07 DIAGNOSIS — F32.A MILD DEPRESSION: Chronic | ICD-10-CM

## 2021-07-07 DIAGNOSIS — T83.511D URINARY TRACT INFECTION ASSOCIATED WITH CATHETERIZATION OF URINARY TRACT, UNSPECIFIED INDWELLING URINARY CATHETER TYPE, SUBSEQUENT ENCOUNTER: ICD-10-CM

## 2021-07-07 DIAGNOSIS — D50.9 IRON DEFICIENCY ANEMIA, UNSPECIFIED IRON DEFICIENCY ANEMIA TYPE: Chronic | ICD-10-CM

## 2021-07-07 DIAGNOSIS — D64.9 ACUTE ON CHRONIC ANEMIA: ICD-10-CM

## 2021-07-07 PROCEDURE — 99495 TRANSJ CARE MGMT MOD F2F 14D: CPT | Performed by: FAMILY MEDICINE

## 2021-07-07 NOTE — TELEPHONE ENCOUNTER
Patient stopped by office stating that he is performing CIC 4x a day needs new referral to [address] [place] [address]  Please provide documentation stating CIC 4x per day

## 2021-07-07 NOTE — ASSESSMENT & PLAN NOTE
He just finished his sodium tablets for his hyponatremia  Blood pressure was high today  He is checking his blood pressure at home daily and will report to me if he runs consistently above 140/90  I will be seeing him back in early August   No blood pressure medication changes will be made today

## 2021-07-07 NOTE — ASSESSMENT & PLAN NOTE
Patient had urosepsis and was recently admitted  He had an E coli UTI this time  Continue close follow-up with urology

## 2021-07-07 NOTE — TELEPHONE ENCOUNTER
Patient requesting call from clinical   Patient states he needs additional catheters ordered as he is using more

## 2021-07-07 NOTE — PROGRESS NOTES
Assessment/Plan:       Problem List Items Addressed This Visit        Digestive    Esophageal reflux (Chronic)     Check magnesium level he on chronic PPI  GAVE (gastric antral vascular ectasia)     This remains clinically stable  Relevant Orders    CBC and differential       Cardiovascular and Mediastinum    Essential hypertension - Primary (Chronic)     He just finished his sodium tablets for his hyponatremia  Blood pressure was high today  He is checking his blood pressure at home daily and will report to me if he runs consistently above 140/90  I will be seeing him back in early August   No blood pressure medication changes will be made today  Relevant Orders    Comprehensive metabolic panel    CBC and differential    Pulmonary hypertension (HCC) (Chronic)     Is clinically stable  Relevant Orders    Comprehensive metabolic panel    CBC and differential    Sinus los-tachy syndrome (HCC)       Genitourinary    Urinary tract infection associated with catheterization of urinary tract [facility])     Patient had urosepsis and was recently admitted  He had an E coli UTI this time  Continue close follow-up with urology  Other    CLL (chronic lymphocytic leukemia) (HCC) (Chronic)    Relevant Orders    CBC and differential    Iron deficiency anemia (Chronic)    Relevant Orders    CBC and differential    Mild depression (HCC) (Chronic)     Stable despite recent stress  Relevant Orders    Comprehensive metabolic panel    Hyponatremia     Check lab work within the next few weeks  He does have a follow-up with me in early August   He has several risk factors for hyponatremia including his CLL as well as being on sertraline  He finished his sodium tablets from the hospital today           Relevant Orders    Comprehensive metabolic panel    Hyperglycemia    Relevant Orders    Comprehensive metabolic panel    Acute on chronic anemia    Relevant Orders    CBC and differential Hemolytic anemia (HCC)    Relevant Orders    CBC and differential    Autoimmune hemolytic anemia (HCC)    Relevant Orders    CBC and differential    E coli bacteremia     Fortunately, he has improved significantly  He has no signs of recurrent sepsis  Continue close follow-up with urology regarding his unfortunate chronic urinary retention with need for self catheterization         Relevant Orders    Magnesium    H/O vitamin D deficiency     He is a high risk for vitamin-D deficiency  Check vitamin-D level  Other Visit Diagnoses     Myalgia                Subjective:      Patient ID: La Nena Orozco is a 78 y o  male  HPI   TCM Call (since 6/6/2021)     Date and time call was made  6/30/2021  4:24 PM    Hospital care reviewed  Records reviewed    Patient was hospitialized at  [address] [place] [address]        Date of Admission  06/24/21    Date of discharge  06/28/21    Diagnosis  emphysematous cyctitis     Disposition  Home    Current Symptoms  None      TCM Call (since 6/6/2021)     Scheduled for follow up? Yes    Did you obtain your prescribed medications  Yes    Do you need help managing your prescriptions or medications  No    Is transportation to your appointment needed  No    I have advised the patient to call PCP with any new or worsening symptoms  ISMAEL Duarte        Patient presents today for his recent hospital admission for urosepsis with blood cultures positive for E coli  Fortunately, he seems to be doing much better  He is having no further fever or chills  He does require self catheterization at least twice daily but up to 4 times daily  He has a history of UTI  His other medical issues seem stable  His blood pressure is up today but he just came off of sodium chloride tablets for hyponatremia  He will be checking his blood pressure at home and is not experiencing any chest pain or shortness of breath    He does have a history of chronic anemia as well as CLL and is following closely with Oncology  He has a history of some mild depression secondary to his wife's history of brain injury which seems to be stable at this time and he is on a moderate dose of sertraline  The following portions of the patient's history were reviewed and updated as appropriate: allergies, current medications, past family history, past medical history, past social history, past surgical history and problem list       Current Outpatient Medications:     amLODIPine (NORVASC) 5 mg tablet, TAKE ONE TABLET BY MOUTH EVERY EVENING, Disp: 90 tablet, Rfl: 3    aspirin 81 MG tablet, Take 1 tablet by mouth daily, Disp: , Rfl:     atorvastatin (LIPITOR) 20 mg tablet, TAKE ONE TABLET BY MOUTH ONCE EVERY DAY, Disp: , Rfl:     metoprolol tartrate (LOPRESSOR) 25 mg tablet, Take 1 tablet (25 mg total) by mouth every 12 (twelve) hours, Disp: 180 tablet, Rfl: 3    montelukast (SINGULAIR) 10 mg tablet, TAKE ONE TABLET BY MOUTH DAILY, Disp: 90 tablet, Rfl: 3    Omega-3 Fatty Acids (FISH OIL) 1,000 mg, Take by mouth daily, Disp: , Rfl:     omeprazole (PriLOSEC) 40 MG capsule, TAKE ONE CAPSULE BY MOUTH ONCE EVERY DAY, Disp: 90 capsule, Rfl: 3    predniSONE 10 mg tablet, Take 1 tablet (10 mg total) by mouth daily, Disp: 30 tablet, Rfl: 0    senna-docusate sodium (SENOKOT S) 8 6-50 mg per tablet, Take 1 tablet by mouth daily at bedtime, Disp: 30 tablet, Rfl: 0    sertraline (ZOLOFT) 50 mg tablet, TAKE ONE TABLET BY MOUTH EVERY DAY, Disp: 90 tablet, Rfl: 3    tamsulosin (FLOMAX) 0 4 mg, Take 1 capsule (0 4 mg total) by mouth daily with dinner CORRECTED SCRIPT! Note change in SIG and QUANTITY  Please process accordingly    Thank you! (9/1/20), Disp: 90 capsule, Rfl: 1    vitamin B-12 (CYANOCOBALAMIN) 500 MCG TABS, Take 1 tablet (500 mcg total) by mouth daily, Disp: 30 tablet, Rfl: 0    folic acid (FOLVITE) 338 mcg tablet, Take 1 tablet (400 mcg total) by mouth daily (Patient not taking: Reported on 7/7/2021), Disp: 30 tablet, Rfl: 0     Review of Systems   Constitutional: Negative for appetite change, chills, fatigue, fever and unexpected weight change  HENT: Negative for trouble swallowing  Eyes: Negative for visual disturbance  Respiratory: Negative for cough, chest tightness, shortness of breath and wheezing  Cardiovascular: Negative for chest pain, palpitations and leg swelling  Gastrointestinal: Negative for abdominal distention, abdominal pain, blood in stool, constipation and diarrhea  Endocrine: Negative for polyuria  Genitourinary: Positive for decreased urine volume (he performs self catheterization)  Negative for difficulty urinating, flank pain, frequency and urgency  Musculoskeletal: Negative for arthralgias and myalgias  Skin: Negative for rash  Neurological: Negative for dizziness and light-headedness  Hematological: Negative for adenopathy  Does not bruise/bleed easily  Psychiatric/Behavioral: Negative for agitation, dysphoric mood and sleep disturbance  The patient is not nervous/anxious  Objective:      /70 (BP Location: Right arm, Patient Position: Sitting, Cuff Size: Large)   Pulse 85   Temp 97 8 °F (36 6 °C)   Ht 5' 8" (1 727 m)   Wt 92 1 kg (203 lb)   SpO2 95%   BMI 30 87 kg/m²          Physical Exam  Constitutional:       General: He is not in acute distress  Appearance: He is well-developed  He is obese  He is not diaphoretic  HENT:      Head: Normocephalic  Eyes:      General:         Right eye: No discharge  Left eye: No discharge  Pupils: Pupils are equal, round, and reactive to light  Neck:      Thyroid: No thyromegaly  Trachea: No tracheal deviation  Cardiovascular:      Rate and Rhythm: Normal rate and regular rhythm  Heart sounds: Normal heart sounds  No murmur heard  Pulmonary:      Effort: Pulmonary effort is normal  No respiratory distress  Breath sounds: No wheezing or rales     Abdominal:      General: There is no distension  Palpations: Abdomen is soft  Tenderness: There is no abdominal tenderness  Musculoskeletal:         General: Normal range of motion  Lymphadenopathy:      Cervical: No cervical adenopathy  Skin:     General: Skin is warm  Findings: No erythema  Neurological:      Mental Status: He is alert and oriented to person, place, and time  Cranial Nerves: No cranial nerve deficit  Psychiatric:         Thought Content:  Thought content normal          Judgment: Judgment normal            Kacy Diego MD

## 2021-07-07 NOTE — ASSESSMENT & PLAN NOTE
Fortunately, he has improved significantly  He has no signs of recurrent sepsis    Continue close follow-up with urology regarding his unfortunate chronic urinary retention with need for self catheterization

## 2021-07-07 NOTE — ASSESSMENT & PLAN NOTE
Check lab work within the next few weeks  He does have a follow-up with me in early August   He has several risk factors for hyponatremia including his CLL as well as being on sertraline  He finished his sodium tablets from the hospital today

## 2021-07-08 ENCOUNTER — TELEPHONE (OUTPATIENT)
Dept: UROLOGY | Facility: CLINIC | Age: 71
End: 2021-07-08

## 2021-07-08 NOTE — TELEPHONE ENCOUNTER
Patient stopped by the office stating he is performing CIC 4x/day and asking for a new referral be sent to [facility]   Referral for CIC 4X/day  fax to [phone] and confirmation received  Patient notified

## 2021-07-15 ENCOUNTER — APPOINTMENT (OUTPATIENT)
Dept: LAB | Facility: MEDICAL CENTER | Age: 71
End: 2021-07-15
Payer: MEDICARE

## 2021-07-15 DIAGNOSIS — C91.10 CLL (CHRONIC LYMPHOCYTIC LEUKEMIA) (HCC): ICD-10-CM

## 2021-07-15 LAB
BASOPHILS # BLD MANUAL: 0 THOUSAND/UL (ref 0–0.1)
BASOPHILS NFR MAR MANUAL: 0 % (ref 0–1)
EOSINOPHIL # BLD MANUAL: 0 THOUSAND/UL (ref 0–0.4)
EOSINOPHIL NFR BLD MANUAL: 0 % (ref 0–6)
ERYTHROCYTE [DISTWIDTH] IN BLOOD BY AUTOMATED COUNT: 17.6 % (ref 11.6–15.1)
FERRITIN SERPL-MCNC: 395 NG/ML (ref 8–388)
HCT VFR BLD AUTO: 34.9 % (ref 36.5–49.3)
HGB BLD-MCNC: 11.2 G/DL (ref 12–17)
LYMPHOCYTES # BLD AUTO: 25.35 THOUSAND/UL (ref 0.6–4.47)
LYMPHOCYTES # BLD AUTO: 93 % (ref 14–44)
MACROCYTES BLD QL AUTO: PRESENT
MCH RBC QN AUTO: 32.8 PG (ref 26.8–34.3)
MCHC RBC AUTO-ENTMCNC: 32.1 G/DL (ref 31.4–37.4)
MCV RBC AUTO: 102 FL (ref 82–98)
MONOCYTES # BLD AUTO: 0.27 THOUSAND/UL (ref 0–1.22)
MONOCYTES NFR BLD: 1 % (ref 4–12)
NEUTROPHILS # BLD MANUAL: 1.64 THOUSAND/UL (ref 1.85–7.62)
NEUTS SEG NFR BLD AUTO: 6 % (ref 43–75)
NRBC BLD AUTO-RTO: 0 /100 WBCS
PLATELET # BLD AUTO: 224 THOUSANDS/UL (ref 149–390)
PLATELET BLD QL SMEAR: ADEQUATE
PMV BLD AUTO: 9.7 FL (ref 8.9–12.7)
RBC # BLD AUTO: 3.41 MILLION/UL (ref 3.88–5.62)
RBC MORPH BLD: PRESENT
TOTAL CELLS COUNTED SPEC: 100
WBC # BLD AUTO: 27.26 THOUSAND/UL (ref 4.31–10.16)

## 2021-07-15 PROCEDURE — 36415 COLL VENOUS BLD VENIPUNCTURE: CPT

## 2021-07-15 PROCEDURE — 85027 COMPLETE CBC AUTOMATED: CPT

## 2021-07-15 PROCEDURE — 82728 ASSAY OF FERRITIN: CPT

## 2021-07-15 PROCEDURE — 85007 BL SMEAR W/DIFF WBC COUNT: CPT

## 2021-07-19 ENCOUNTER — OFFICE VISIT (OUTPATIENT)
Dept: HEMATOLOGY ONCOLOGY | Facility: CLINIC | Age: 71
End: 2021-07-19
Payer: MEDICARE

## 2021-07-19 ENCOUNTER — HOSPITAL ENCOUNTER (OUTPATIENT)
Dept: INFUSION CENTER | Facility: CLINIC | Age: 71
End: 2021-07-19

## 2021-07-19 ENCOUNTER — TELEPHONE (OUTPATIENT)
Dept: HEMATOLOGY ONCOLOGY | Facility: CLINIC | Age: 71
End: 2021-07-19

## 2021-07-19 VITALS
BODY MASS INDEX: 31.52 KG/M2 | WEIGHT: 208 LBS | DIASTOLIC BLOOD PRESSURE: 76 MMHG | SYSTOLIC BLOOD PRESSURE: 138 MMHG | OXYGEN SATURATION: 93 % | RESPIRATION RATE: 18 BRPM | HEIGHT: 68 IN | HEART RATE: 91 BPM | TEMPERATURE: 99 F

## 2021-07-19 DIAGNOSIS — C91.10 CLL (CHRONIC LYMPHOCYTIC LEUKEMIA) (HCC): Primary | Chronic | ICD-10-CM

## 2021-07-19 PROCEDURE — 99214 OFFICE O/P EST MOD 30 MIN: CPT | Performed by: INTERNAL MEDICINE

## 2021-07-19 NOTE — PROGRESS NOTES
Hematology / Oncology Outpatient Follow Up Note    Sidney Lane 78 y o  male LRE:[birth year] LAZARO:[id number]         Date:  7/19/2021    Assessment / Plan: Anahy Crump old gentleman with chronic lymphocytic leukemia diagnosed in March 2010  He has lymphocytosis with no lymphadenopathy  He is currently on watchful waiting   He also has iron deficiency anemia, probably due to the gastric vascular ectasia   He is on venofer maintenance therapy   In April 2021, he was diagnosed with autoimmune hemolytic anemia for which he was treated with prednisone with significant improvement of anemia  He completed prednisone taper, today  His hemoglobin is 11 2  He has above average ferritin  Therefore, I am going to discontinue venofer infusion  He does not need active treatment for CLL at this moment  I recommended him to have CBC, CMP and ferritin in 3 months followed by office visit  He is in agreement with my recommendation                                     Subjective:      HPI:             Interval History:  A 80 year old gentleman with chronic lymphocytic leukemia diagnosed in March 2010  He has been on watchful waiting because he has absolutely no symptoms from hematology standpoint  Over the last few years, he had slowly progressive microcytic anemia  He was found to have iron deficiency   He was previously treated with IV iron with improvement of anemia   His last colonoscopy was in October 2018 which was negative   In early 2019, he developed iron deficiency anemia again  Lulu Reddy, he underwent weekly venofer x5, followed by maintenance Venofer every 6 months   He previously underwent EGD which showed several gastric polyp as well as vascular ectasia   In early April 2021, he was hospitalized with severe anemia with macrocytosis   He had Narciso positive anemia and elevated reticulocyte count, consistent with autoimmune hemolytic anemia   He was placed on prednisone 60 mg daily   He has significant improvement of anemia  In the last 2 months, he underwent prednisone taper  Today, he discontinued prednisone  His most recent hemoglobin was 11 2  He feels well  He has no respiratory symptoms  He denied any pain  During the treatment with prednisone, he has significant weight gain  His performance status is normal                                   Objective:      Primary Diagnosis:     1  Chronic lymphocytic leukemia  Diagnosed in March 2010  CD-5, CD-23 positive  Kappa light chain positive  CD-38 negative  ZAP-70 positive disease   Diagnosed in 2010     2  Iron deficiency anemia      3    Autoimmune hemolytic anemia, diagnosed in April 2021      Cancer Staging:  No matching staging information was found for the patient         Previous Hematologic/ Oncologic Treatment:         Prednisone, completed in July 2021      Current Hematologic/ Oncologic Treatment:        observation        Disease Status:      NA     Test Results:     Pathology:           Radiology:           Laboratory:     See below  For CBC CMP and ferritin      Physical Exam:        General Appearance:    Alert, oriented          Eyes:    PERRL   Ears:    Normal external ear canals, both ears   Nose:   Nares normal, septum midline   Throat:   Mucosa moist  Pharynx without injection  Neck:   Supple         Lungs:     Clear to auscultation bilaterally   Chest Wall:    No tenderness or deformity    Heart:    Regular rate and rhythm         Abdomen:     Soft, non-tender, bowel sounds +, no organomegaly               Extremities:   Extremities no cyanosis or edema         Skin:   no rash or icterus  Lymph nodes:   Cervical, supraclavicular, and axillary nodes normal   Neurologic:   CNII-XII intact, normal strength, sensation and reflexes     Throughout           ROS: Review of Systems   All other systems reviewed and are negative            Imaging: CT abdomen pelvis with contrast    Result Date: 6/25/2021  Narrative: CT ABDOMEN AND PELVIS WITH IV CONTRAST INDICATION:   left  sided abdominal pain  COMPARISON:  CT abdomen and pelvis dated 4/9/2021 TECHNIQUE:  CT examination of the abdomen and pelvis was performed  Axial, sagittal, and coronal 2D reformatted images were created from the source data and submitted for interpretation  Radiation dose length product (DLP) for this visit:  930 mGy-cm   This examination, like all CT scans performed in the [facility], was performed utilizing techniques to minimize radiation dose exposure, including the use of iterative reconstruction and automated exposure control  IV Contrast:  100 mL of iohexol (OMNIPAQUE) Enteric Contrast:  Enteric contrast was not administered  FINDINGS: ABDOMEN LOWER CHEST:  Mild atelectasis/scarring in the bilateral lower lung fields  Moderate coronary atherosclerosis  The heart is normal in size  LIVER/BILIARY TREE:  Subtle nodular contour of the liver, consider a component of hepatic cirrhosis  Otherwise grossly unremarkable  GALLBLADDER:  No calcified gallstones  No pericholecystic inflammatory change  SPLEEN:  Enlarged, splenic volume estimated at 915 mL PANCREAS:  Unremarkable  ADRENAL GLANDS:  Unremarkable  KIDNEYS/URETERS:  Subtle heterogeneous enhancement of the bilateral kidneys, consider pyelonephritis  No hydronephrosis  STOMACH AND BOWEL:  Colonic diverticulosis  No discrete evidence of acute diverticulitis  Small hiatal hernia  No evidence of bowel obstruction  APPENDIX:  No findings to suggest appendicitis  ABDOMINOPELVIC CAVITY:  No ascites  No pneumoperitoneum  No lymphadenopathy  VESSELS:  Moderate atherosclerosis; no aortic aneurysm PELVIS REPRODUCTIVE ORGANS:  Unremarkable for patient's age  URINARY BLADDER:  Several posterior bladder diverticula are redemonstrated    There is moderate circumferential bladder wall thickening with air within the bladder lumen as well as within the wall of the bladder (axial image 79, series 2), suspicious for emphysematous cystitis  ABDOMINAL WALL/INGUINAL REGIONS:  Small fat-containing inguinal hernias  Small fat-containing umbilical hernia  OSSEOUS STRUCTURES:  Multiple old right-sided rib fractures  Multilevel degenerative changes of the spine, most prominent in the lumbar region ORIF of the right hip which appears intact  Degenerative changes of the left hip  Impression: Moderate circumferential bladder wall thickening with air within the bladder lumen as well as within the wall of the bladder (axial image 79, series 2), suspicious for emphysematous cystitis  Correlation with the patient's symptoms, laboratory values, and urinalysis recommended  Subtle heterogeneous enhancement of the bilateral kidneys, consider pyelonephritis  No hydronephrosis  Subtle nodular contour of the liver, consider a component of hepatic cirrhosis  Associated splenomegaly, nonspecific  Colonic diverticulosis without evidence of acute diverticulitis, small hiatal hernia, coronary atherosclerosis, and other findings as above   I personally discussed this study with LEANN BOLAND on 6/25/2021 at 1:00 AM  Workstation performed: [id number]         Labs:   Lab Results   Component Value Date    WBC 27 26 (H) 07/15/2021    HGB 11 2 (L) 07/15/2021    HCT 34 9 (L) 07/15/2021     (H) 07/15/2021     07/15/2021     Lab Results   Component Value Date     (L) 07/20/2015    K 3 8 06/28/2021    CL 99 (L) 06/28/2021    CO2 27 06/28/2021    ANIONGAP 9 07/20/2015    BUN 9 06/28/2021    CREATININE 0 79 06/28/2021    GLUCOSE 130 01/10/2021    GLUF 104 (H) 03/11/2021    CALCIUM 8 5 06/28/2021    CORRECTEDCA 9 0 01/21/2021    AST 22 06/24/2021    ALT 43 06/24/2021    ALKPHOS 83 06/24/2021    PROT 8 0 07/20/2015    BILITOT 0 47 07/20/2015    EGFR 92 06/28/2021           Lab Results   Component Value Date     (H) 04/09/2021       No results found for: SPEP, UPEP    Lab Results   Component Value Date    PSA 0 4 01/27/2021    PSA 0 2 01/30/2020    PSA 0 2 02/18/2019         Lab Results   Component Value Date    IRON 107 04/09/2021    TIBC 324 04/09/2021    FERRITIN 395 (H) 07/15/2021       Lab Results   Component Value Date    MPKEAWMF41 108 01/16/2021       Lab Results   Component Value Date    FOLATE 11 8 01/16/2021         Current Medications: Reviewed  Allergies: Reviewed  PMH/FH/SH:  Reviewed      Vital Sign:    Body surface area is 2 08 meters squared      Wt Readings from Last 3 Encounters:   07/19/21 94 3 kg (208 lb)   07/07/21 92 1 kg (203 lb)   07/02/21 93 kg (205 lb)        Temp Readings from Last 3 Encounters:   07/19/21 99 °F (37 2 °C) (Tympanic)   07/07/21 97 8 °F (36 6 °C)   06/28/21 97 9 °F (36 6 °C)        BP Readings from Last 3 Encounters:   07/19/21 138/76   07/07/21 160/70   07/02/21 130/70         Pulse Readings from Last 3 Encounters:   07/19/21 91   07/07/21 85   07/02/21 80     @LASTSAO2(3)@

## 2021-07-19 NOTE — TELEPHONE ENCOUNTER
Patient wanted an earlier appointment during the first week of October because later on in the month, patient is going to [place] supposedly

## 2021-07-20 ENCOUNTER — TELEPHONE (OUTPATIENT)
Dept: UROLOGY | Facility: AMBULATORY SURGERY CENTER | Age: 71
End: 2021-07-20

## 2021-07-20 NOTE — TELEPHONE ENCOUNTER
Patient is calling to request psa lab order to be added to epic  He will get lab work done for his upcoming appointment  Also requesting to have clinical call him back   [phone]

## 2021-07-20 NOTE — TELEPHONE ENCOUNTER
Spoke with patient and made him aware psa lab test was ordered and he can have it completed prior to his upcoming appointment in August  He verbalized understanding

## 2021-07-22 DIAGNOSIS — I27.20 PULMONARY HYPERTENSION (HCC): ICD-10-CM

## 2021-07-22 RX ORDER — AMLODIPINE BESYLATE 5 MG/1
TABLET ORAL
Qty: 90 TABLET | Refills: 3 | Status: SHIPPED | OUTPATIENT
Start: 2021-07-22 | End: 2022-06-22 | Stop reason: SDUPTHER

## 2021-07-29 ENCOUNTER — APPOINTMENT (OUTPATIENT)
Dept: LAB | Facility: MEDICAL CENTER | Age: 71
End: 2021-07-29
Payer: MEDICARE

## 2021-07-29 ENCOUNTER — TELEPHONE (OUTPATIENT)
Dept: UROLOGY | Facility: AMBULATORY SURGERY CENTER | Age: 71
End: 2021-07-29

## 2021-07-29 ENCOUNTER — NURSE TRIAGE (OUTPATIENT)
Dept: OTHER | Facility: OTHER | Age: 71
End: 2021-07-29

## 2021-07-29 ENCOUNTER — APPOINTMENT (OUTPATIENT)
Dept: LAB | Facility: HOSPITAL | Age: 71
End: 2021-07-29
Attending: UROLOGY
Payer: MEDICARE

## 2021-07-29 DIAGNOSIS — K31.819 GAVE (GASTRIC ANTRAL VASCULAR ECTASIA): ICD-10-CM

## 2021-07-29 DIAGNOSIS — I10 ESSENTIAL HYPERTENSION: ICD-10-CM

## 2021-07-29 DIAGNOSIS — D50.9 IRON DEFICIENCY ANEMIA, UNSPECIFIED IRON DEFICIENCY ANEMIA TYPE: Chronic | ICD-10-CM

## 2021-07-29 DIAGNOSIS — B96.20 E COLI BACTEREMIA: ICD-10-CM

## 2021-07-29 DIAGNOSIS — F32.A MILD DEPRESSION: Chronic | ICD-10-CM

## 2021-07-29 DIAGNOSIS — R00.1 BRADYCARDIA: ICD-10-CM

## 2021-07-29 DIAGNOSIS — E87.1 HYPONATREMIA: ICD-10-CM

## 2021-07-29 DIAGNOSIS — R73.9 HYPERGLYCEMIA: ICD-10-CM

## 2021-07-29 DIAGNOSIS — C91.10 CLL (CHRONIC LYMPHOCYTIC LEUKEMIA) (HCC): Chronic | ICD-10-CM

## 2021-07-29 DIAGNOSIS — D64.9 ACUTE ON CHRONIC ANEMIA: ICD-10-CM

## 2021-07-29 DIAGNOSIS — N39.0 URINARY TRACT INFECTION WITHOUT HEMATURIA, SITE UNSPECIFIED: ICD-10-CM

## 2021-07-29 DIAGNOSIS — N39.0 URINARY TRACT INFECTION WITHOUT HEMATURIA, SITE UNSPECIFIED: Primary | ICD-10-CM

## 2021-07-29 DIAGNOSIS — D59.10 AUTOIMMUNE HEMOLYTIC ANEMIA (HCC): ICD-10-CM

## 2021-07-29 DIAGNOSIS — E78.00 HYPERCHOLESTEROLEMIA: ICD-10-CM

## 2021-07-29 DIAGNOSIS — Z12.5 PROSTATE CANCER SCREENING: ICD-10-CM

## 2021-07-29 DIAGNOSIS — R78.81 E COLI BACTEREMIA: ICD-10-CM

## 2021-07-29 DIAGNOSIS — R94.31 QT PROLONGATION: ICD-10-CM

## 2021-07-29 DIAGNOSIS — I27.20 PULMONARY HYPERTENSION (HCC): ICD-10-CM

## 2021-07-29 LAB
ALBUMIN SERPL BCP-MCNC: 4 G/DL (ref 3.5–5)
ALP SERPL-CCNC: 97 U/L (ref 46–116)
ALT SERPL W P-5'-P-CCNC: 33 U/L (ref 12–78)
ANION GAP SERPL CALCULATED.3IONS-SCNC: 9 MMOL/L (ref 4–13)
AST SERPL W P-5'-P-CCNC: 17 U/L (ref 5–45)
BACTERIA UR QL AUTO: ABNORMAL /HPF
BASOPHILS # BLD AUTO: 0.04 THOUSANDS/ΜL (ref 0–0.1)
BASOPHILS NFR BLD AUTO: 0 % (ref 0–1)
BILIRUB SERPL-MCNC: 0.67 MG/DL (ref 0.2–1)
BILIRUB UR QL STRIP: NEGATIVE
BUN SERPL-MCNC: 6 MG/DL (ref 5–25)
CALCIUM SERPL-MCNC: 8.6 MG/DL (ref 8.3–10.1)
CHLORIDE SERPL-SCNC: 95 MMOL/L (ref 100–108)
CHOLEST SERPL-MCNC: 128 MG/DL (ref 50–200)
CLARITY UR: ABNORMAL
CO2 SERPL-SCNC: 26 MMOL/L (ref 21–32)
COLOR UR: ABNORMAL
CREAT SERPL-MCNC: 0.64 MG/DL (ref 0.6–1.3)
EOSINOPHIL # BLD AUTO: 0.08 THOUSAND/ΜL (ref 0–0.61)
EOSINOPHIL NFR BLD AUTO: 1 % (ref 0–6)
ERYTHROCYTE [DISTWIDTH] IN BLOOD BY AUTOMATED COUNT: 18.6 % (ref 11.6–15.1)
EST. AVERAGE GLUCOSE BLD GHB EST-MCNC: 105 MG/DL
GFR SERPL CREATININE-BSD FRML MDRD: 101 ML/MIN/1.73SQ M
GLUCOSE P FAST SERPL-MCNC: 113 MG/DL (ref 65–99)
GLUCOSE UR STRIP-MCNC: NEGATIVE MG/DL
HBA1C MFR BLD: 5.3 %
HCT VFR BLD AUTO: 34.2 % (ref 36.5–49.3)
HDLC SERPL-MCNC: 43 MG/DL
HGB BLD-MCNC: 11.4 G/DL (ref 12–17)
HGB UR QL STRIP.AUTO: ABNORMAL
IMM GRANULOCYTES # BLD AUTO: 0.05 THOUSAND/UL (ref 0–0.2)
IMM GRANULOCYTES NFR BLD AUTO: 0 % (ref 0–2)
KETONES UR STRIP-MCNC: NEGATIVE MG/DL
LDLC SERPL CALC-MCNC: 64 MG/DL (ref 0–100)
LEUKOCYTE ESTERASE UR QL STRIP: ABNORMAL
LYMPHOCYTES # BLD AUTO: 13.25 THOUSANDS/ΜL (ref 0.6–4.47)
LYMPHOCYTES NFR BLD AUTO: 77 % (ref 14–44)
MAGNESIUM SERPL-MCNC: 2.1 MG/DL (ref 1.6–2.6)
MCH RBC QN AUTO: 33.8 PG (ref 26.8–34.3)
MCHC RBC AUTO-ENTMCNC: 33.3 G/DL (ref 31.4–37.4)
MCV RBC AUTO: 102 FL (ref 82–98)
MONOCYTES # BLD AUTO: 0.28 THOUSAND/ΜL (ref 0.17–1.22)
MONOCYTES NFR BLD AUTO: 2 % (ref 4–12)
NEUTROPHILS # BLD AUTO: 3.31 THOUSANDS/ΜL (ref 1.85–7.62)
NEUTS SEG NFR BLD AUTO: 20 % (ref 43–75)
NITRITE UR QL STRIP: NEGATIVE
NON-SQ EPI CELLS URNS QL MICRO: ABNORMAL /HPF
NRBC BLD AUTO-RTO: 0 /100 WBCS
OTHER STN SPEC: ABNORMAL
PH UR STRIP.AUTO: 5.5 [PH]
PLATELET # BLD AUTO: 197 THOUSANDS/UL (ref 149–390)
PMV BLD AUTO: 10.2 FL (ref 8.9–12.7)
POTASSIUM SERPL-SCNC: 4.5 MMOL/L (ref 3.5–5.3)
PROT SERPL-MCNC: 7.7 G/DL (ref 6.4–8.2)
PROT UR STRIP-MCNC: NEGATIVE MG/DL
PSA SERPL-MCNC: 0.4 NG/ML (ref 0–4)
RBC # BLD AUTO: 3.37 MILLION/UL (ref 3.88–5.62)
RBC #/AREA URNS AUTO: ABNORMAL /HPF
SODIUM SERPL-SCNC: 130 MMOL/L (ref 136–145)
SP GR UR STRIP.AUTO: 1.01 (ref 1–1.03)
TRIGL SERPL-MCNC: 103 MG/DL
TSH SERPL DL<=0.05 MIU/L-ACNC: 0.96 UIU/ML (ref 0.36–3.74)
UROBILINOGEN UR QL STRIP.AUTO: 0.2 E.U./DL
WBC # BLD AUTO: 17.01 THOUSAND/UL (ref 4.31–10.16)
WBC #/AREA URNS AUTO: ABNORMAL /HPF
WBC CLUMPS # UR AUTO: ABNORMAL /UL

## 2021-07-29 PROCEDURE — 36415 COLL VENOUS BLD VENIPUNCTURE: CPT

## 2021-07-29 PROCEDURE — 80053 COMPREHEN METABOLIC PANEL: CPT

## 2021-07-29 PROCEDURE — 84443 ASSAY THYROID STIM HORMONE: CPT

## 2021-07-29 PROCEDURE — G0103 PSA SCREENING: HCPCS

## 2021-07-29 PROCEDURE — 80061 LIPID PANEL: CPT

## 2021-07-29 PROCEDURE — 87086 URINE CULTURE/COLONY COUNT: CPT

## 2021-07-29 PROCEDURE — 83735 ASSAY OF MAGNESIUM: CPT

## 2021-07-29 PROCEDURE — 81001 URINALYSIS AUTO W/SCOPE: CPT

## 2021-07-29 PROCEDURE — 83036 HEMOGLOBIN GLYCOSYLATED A1C: CPT

## 2021-07-29 PROCEDURE — 87186 SC STD MICRODIL/AGAR DIL: CPT

## 2021-07-29 PROCEDURE — 85025 COMPLETE CBC W/AUTO DIFF WBC: CPT

## 2021-07-29 NOTE — TELEPHONE ENCOUNTER
Spoke to pt regarding most recent results of urine testing dated today 7/29/21    Urine culture is pending  Pt will await test results and a call from the office with those results

## 2021-07-29 NOTE — TELEPHONE ENCOUNTER
Regarding: UA/UC Order Request  ----- Message from [facility] sent at 7/29/2021  8:16 AM EDT -----  "I am having trouble urinating and I am at the lab right now to have blood work completed   Can I please get an order for a UA/UC "

## 2021-07-29 NOTE — TELEPHONE ENCOUNTER
Patient managed by Dr Lester [facility]) patient called in concern about his abnormal Urinalysis   Patient stated he was just in the hospital with a infection that went into his blood stream    Patient can be reached at [phone]

## 2021-07-29 NOTE — TELEPHONE ENCOUNTER
Order was placed by health call nurse for urine testing, which is in process  Office to monitor for results and follow up with patient at that time

## 2021-07-29 NOTE — TELEPHONE ENCOUNTER
Reason for Disposition   All other urine symptoms    Answer Assessment - Initial Assessment Questions  1  SYMPTOM: "What's the main symptom you're concerned about?" (e g , frequency, incontinence)      Patient catheterizes himself due to frequency, but he only passes a small amount of urine  Burning in urethra when he pulls the catheter out  2  ONSET: "When did the  Symptoms  start?"     Started 2 days ago  Recently treated for a UTI  3   PAIN: "Is there any pain?" If Yes, ask: "How bad is it?" (Scale: 1-10; mild, moderate, severe)      Burning pain rated 2-3/10    4  CAUSE: "What do you think is causing the symptoms?"      UTI  5  OTHER SYMPTOMS: "Do you have any other symptoms?" (e g , fever, flank pain, blood in urine, pain with urination)      Denied fever, hematuria, flank or bladder pain    Protocols used: [facility]

## 2021-07-30 ENCOUNTER — TELEPHONE (OUTPATIENT)
Dept: FAMILY MEDICINE CLINIC | Facility: CLINIC | Age: 71
End: 2021-07-30

## 2021-07-30 ENCOUNTER — APPOINTMENT (OUTPATIENT)
Dept: LAB | Facility: HOSPITAL | Age: 71
End: 2021-07-30
Payer: MEDICARE

## 2021-07-30 DIAGNOSIS — E87.1 HYPONATREMIA: ICD-10-CM

## 2021-07-30 LAB
ANION GAP SERPL CALCULATED.3IONS-SCNC: 13 MMOL/L (ref 5–14)
BUN SERPL-MCNC: 7 MG/DL (ref 5–25)
CALCIUM SERPL-MCNC: 9.3 MG/DL (ref 8.4–10.2)
CHLORIDE SERPL-SCNC: 98 MMOL/L (ref 97–108)
CO2 SERPL-SCNC: 25 MMOL/L (ref 22–30)
CREAT SERPL-MCNC: 0.61 MG/DL (ref 0.7–1.5)
GFR SERPL CREATININE-BSD FRML MDRD: 103 ML/MIN/1.73SQ M
GLUCOSE P FAST SERPL-MCNC: 123 MG/DL (ref 70–99)
POTASSIUM SERPL-SCNC: 4.4 MMOL/L (ref 3.6–5)
SODIUM SERPL-SCNC: 136 MMOL/L (ref 137–147)

## 2021-07-30 PROCEDURE — 36415 COLL VENOUS BLD VENIPUNCTURE: CPT

## 2021-07-30 PROCEDURE — 80048 BASIC METABOLIC PNL TOTAL CA: CPT

## 2021-07-30 NOTE — TELEPHONE ENCOUNTER
Patient called in to the office stating he was mad because he was not told that his blood work that Dr Gonzalo Mitchell wanted him to do was stat and he has to fast  He was yelling at me saying he is hungry and wants to go eat and wanted to know why he has to go now  Because patient was yelling at me I went to get my [place] to talk with him  Thank you!

## 2021-07-30 NOTE — TELEPHONE ENCOUNTER
Patient had urine culture completed 1 month ago and showed E coli  U/a has been resulted bu not the urine culture  Patient states that he doesn't have any fever, no burning with catherization and urine is clear  He states that he doesn't think that he needs anything now So we will see out things are on Monday  He has an appointment on 8/12 - he has a lot of questions about urolift or TURP   Patient has been catherizing more than normal

## 2021-08-02 ENCOUNTER — OFFICE VISIT (OUTPATIENT)
Dept: FAMILY MEDICINE CLINIC | Facility: CLINIC | Age: 71
End: 2021-08-02
Payer: MEDICARE

## 2021-08-02 VITALS
BODY MASS INDEX: 31.58 KG/M2 | HEART RATE: 82 BPM | HEIGHT: 68 IN | WEIGHT: 208.4 LBS | DIASTOLIC BLOOD PRESSURE: 80 MMHG | OXYGEN SATURATION: 97 % | RESPIRATION RATE: 12 BRPM | SYSTOLIC BLOOD PRESSURE: 142 MMHG

## 2021-08-02 DIAGNOSIS — R73.9 HYPERGLYCEMIA: ICD-10-CM

## 2021-08-02 DIAGNOSIS — I27.20 PULMONARY HYPERTENSION (HCC): Chronic | ICD-10-CM

## 2021-08-02 DIAGNOSIS — E87.1 HYPONATREMIA: ICD-10-CM

## 2021-08-02 DIAGNOSIS — G47.33 OSA (OBSTRUCTIVE SLEEP APNEA): Chronic | ICD-10-CM

## 2021-08-02 DIAGNOSIS — C91.10 CLL (CHRONIC LYMPHOCYTIC LEUKEMIA) (HCC): Chronic | ICD-10-CM

## 2021-08-02 DIAGNOSIS — I10 ESSENTIAL HYPERTENSION: Primary | ICD-10-CM

## 2021-08-02 DIAGNOSIS — N39.0 URINARY TRACT INFECTION ASSOCIATED WITH CATHETERIZATION OF URINARY TRACT, UNSPECIFIED INDWELLING URINARY CATHETER TYPE, SUBSEQUENT ENCOUNTER: ICD-10-CM

## 2021-08-02 DIAGNOSIS — I49.5 SINUS BRADY-TACHY SYNDROME (HCC): ICD-10-CM

## 2021-08-02 DIAGNOSIS — K21.9 GASTROESOPHAGEAL REFLUX DISEASE WITHOUT ESOPHAGITIS: Chronic | ICD-10-CM

## 2021-08-02 DIAGNOSIS — I47.2 NSVT (NONSUSTAINED VENTRICULAR TACHYCARDIA) (HCC): ICD-10-CM

## 2021-08-02 DIAGNOSIS — T83.511D URINARY TRACT INFECTION ASSOCIATED WITH CATHETERIZATION OF URINARY TRACT, UNSPECIFIED INDWELLING URINARY CATHETER TYPE, SUBSEQUENT ENCOUNTER: ICD-10-CM

## 2021-08-02 DIAGNOSIS — D59.19 OTHER AUTOIMMUNE HEMOLYTIC ANEMIA (HCC): ICD-10-CM

## 2021-08-02 DIAGNOSIS — F32.A MILD DEPRESSION: Chronic | ICD-10-CM

## 2021-08-02 PROBLEM — B96.20 E COLI BACTEREMIA: Status: RESOLVED | Noted: 2021-06-27 | Resolved: 2021-08-02

## 2021-08-02 PROBLEM — R78.81 E COLI BACTEREMIA: Status: RESOLVED | Noted: 2021-06-27 | Resolved: 2021-08-02

## 2021-08-02 PROCEDURE — 99214 OFFICE O/P EST MOD 30 MIN: CPT | Performed by: FAMILY MEDICINE

## 2021-08-02 NOTE — PROGRESS NOTES
Assessment/Plan:       Problem List Items Addressed This Visit        Digestive    Esophageal reflux (Chronic)     Continue PPI therapy  Respiratory    DIXIE not currently treated (Chronic)     Patient declined CPAP  Cardiovascular and Mediastinum    Essential hypertension - Primary (Chronic)     Blood pressure is well controlled  Continue current regimen  Pulmonary hypertension ([facility] ) (Chronic)     He is clinically stable and denies excessive shortness of breath at this time  Sinus los-tachy syndrome ([facility] )     This remains clinically stable  Continue routine cardiology follow-up         NSVT (nonsustained ventricular tachycardia) ([facility] )     He is now on a beta blocker ever since his V-tach noted during his [facility] hospitalization  Relevant Orders    TSH, 3rd generation with Free T4 reflex       Genitourinary    Urinary tract infection associated with catheterization of urinary tract ([facility] [address] )     He does have a positive urine culture for Staph  I reached out to Urology and Dr Eleno Guzman does not feel he needs treatment for this as he would probably be colonized with his chronic need for catheterization            Other    CLL (chronic lymphocytic leukemia) (HCC) (Chronic)    Mild depression (HCC) (Chronic)     Mood seems pretty stable at this time  He will continue with sertraline  Relevant Orders    TSH, 3rd generation with Free T4 reflex    Hyponatremia     Sodium did drop significant last week  This may be related to some intermittent alcohol abuse  He also remains on sertraline  Continue monitoring sertraline closely  Hyperglycemia     Continue routine monitoring  Relevant Orders    Hemoglobin A1C    Hemolytic anemia ([facility] [address] )     Continue close follow-up with Hematology  Subjective:      Patient ID: Darrell Ac is a 78 y o  male  HPI patient presents today for follow-up for his chronic health issues    He feels pretty well overall  He has been gaining weight ever since he lost weight during his COVID illness  He feels well at this time and is no acute complaints  He has history of SVT and remains on metoprolol  He denies any excessive palpitations or lightheadedness at this time  He has a history of hyperlipidemia and remains on statin therapy without myalgias  He does unfortunately of chronic urinary retention requiring self catheterization of the 4 times daily  Recent urine did show Staph infection  He has no fever or chills  He did have sepsis in June likely from a urinary source  The following portions of the patient's history were reviewed and updated as appropriate: allergies, current medications, past family history, past medical history, past social history, past surgical history and problem list       Current Outpatient Medications:     amLODIPine (NORVASC) 5 mg tablet, TAKE ONE TABLET BY MOUTH EVERY EVENING, Disp: 90 tablet, Rfl: 3    aspirin 81 MG tablet, Take 1 tablet by mouth daily, Disp: , Rfl:     atorvastatin (LIPITOR) 20 mg tablet, TAKE ONE TABLET BY MOUTH ONCE EVERY DAY, Disp: , Rfl:     metoprolol tartrate (LOPRESSOR) 25 mg tablet, Take 1 tablet (25 mg total) by mouth every 12 (twelve) hours, Disp: 180 tablet, Rfl: 3    montelukast (SINGULAIR) 10 mg tablet, TAKE ONE TABLET BY MOUTH DAILY, Disp: 90 tablet, Rfl: 3    Omega-3 Fatty Acids (FISH OIL) 1,000 mg, Take by mouth daily, Disp: , Rfl:     omeprazole (PriLOSEC) 40 MG capsule, TAKE ONE CAPSULE BY MOUTH ONCE EVERY DAY, Disp: 90 capsule, Rfl: 3    senna-docusate sodium (SENOKOT S) 8 6-50 mg per tablet, Take 1 tablet by mouth daily at bedtime, Disp: 30 tablet, Rfl: 0    sertraline (ZOLOFT) 50 mg tablet, TAKE ONE TABLET BY MOUTH EVERY DAY, Disp: 90 tablet, Rfl: 3    tamsulosin (FLOMAX) 0 4 mg, Take 1 capsule (0 4 mg total) by mouth daily with dinner CORRECTED SCRIPT! Note change in SIG and QUANTITY  Please process accordingly  Thank you! (9/1/20), Disp: 90 capsule, Rfl: 1    folic acid (FOLVITE) 592 mcg tablet, Take 1 tablet (400 mcg total) by mouth daily (Patient not taking: Reported on 8/2/2021), Disp: 30 tablet, Rfl: 0    vitamin B-12 (CYANOCOBALAMIN) 500 MCG TABS, Take 1 tablet (500 mcg total) by mouth daily, Disp: 30 tablet, Rfl: 0     Review of Systems   Constitutional: Negative for appetite change, chills, fatigue, fever and unexpected weight change  HENT: Negative for trouble swallowing  Eyes: Negative for visual disturbance  Respiratory: Negative for cough, chest tightness, shortness of breath and wheezing  Cardiovascular: Negative for chest pain, palpitations and leg swelling  Gastrointestinal: Negative for abdominal distention, abdominal pain, blood in stool, constipation and diarrhea  Endocrine: Negative for polyuria  Genitourinary: Negative for difficulty urinating and flank pain  Musculoskeletal: Negative for arthralgias, gait problem and myalgias  Skin: Negative for rash  Neurological: Negative for dizziness, weakness and light-headedness  Hematological: Negative for adenopathy  Does not bruise/bleed easily  Psychiatric/Behavioral: Negative for dysphoric mood and sleep disturbance  The patient is not nervous/anxious  Objective:      /80 (BP Location: Left arm, Patient Position: Sitting, Cuff Size: Standard)   Pulse 82   Resp 12   Ht 5' 8" (1 727 m)   Wt 94 5 kg (208 lb 6 4 oz)   SpO2 97%   BMI 31 69 kg/m²          Physical Exam  Constitutional:       General: He is not in acute distress  Appearance: He is well-developed  He is obese  He is not diaphoretic  HENT:      Head: Normocephalic  Eyes:      General:         Right eye: No discharge  Left eye: No discharge  Pupils: Pupils are equal, round, and reactive to light  Neck:      Thyroid: No thyromegaly  Trachea: No tracheal deviation  Cardiovascular:      Rate and Rhythm: Normal rate and regular rhythm  Heart sounds: Normal heart sounds  No murmur heard  Pulmonary:      Effort: Pulmonary effort is normal  No respiratory distress  Breath sounds: No wheezing or rales  Abdominal:      General: There is no distension  Palpations: Abdomen is soft  Tenderness: There is no abdominal tenderness  Musculoskeletal:         General: Normal range of motion  Lymphadenopathy:      Cervical: No cervical adenopathy  Skin:     General: Skin is warm  Findings: No erythema  Neurological:      Mental Status: He is alert and oriented to person, place, and time  Cranial Nerves: No cranial nerve deficit  Psychiatric:         Thought Content:  Thought content normal          Judgment: Judgment normal            Gabino Delarosa MD

## 2021-08-04 NOTE — ASSESSMENT & PLAN NOTE
Sodium did drop significant last week  This may be related to some intermittent alcohol abuse  He also remains on sertraline  Continue monitoring sertraline closely

## 2021-08-04 NOTE — ASSESSMENT & PLAN NOTE
He does have a positive urine culture for Staph    I reached out to Urology and Dr Jose Luis Posey does not feel he needs treatment for this as he would probably be colonized with his chronic need for catheterization

## 2021-08-12 ENCOUNTER — TELEPHONE (OUTPATIENT)
Dept: OTHER | Facility: OTHER | Age: 71
End: 2021-08-12

## 2021-08-13 NOTE — TELEPHONE ENCOUNTER
Patient call and advised that he would like to reschedule his appointment from yesterday  Patient advised that he would like the soonest available because he is having to CIC more than usual  Scheduled patient for 08/26/21 @11:15am  Please advise if appointment is appropriate

## 2021-08-18 ENCOUNTER — TELEPHONE (OUTPATIENT)
Dept: HEMATOLOGY ONCOLOGY | Facility: CLINIC | Age: 71
End: 2021-08-18

## 2021-08-18 NOTE — TELEPHONE ENCOUNTER
Patient is calling to see if Dr Don Sánchez would recommend he get the COVID 19 booster? Would he qualify for it?     Will forward to Dr Leonora Morrow RN to review with MD

## 2021-08-21 LAB — BACTERIA UR CULT: ABNORMAL

## 2021-08-26 ENCOUNTER — OFFICE VISIT (OUTPATIENT)
Dept: UROLOGY | Facility: CLINIC | Age: 71
End: 2021-08-26
Payer: MEDICARE

## 2021-08-26 VITALS
BODY MASS INDEX: 31.37 KG/M2 | HEIGHT: 68 IN | SYSTOLIC BLOOD PRESSURE: 130 MMHG | WEIGHT: 207 LBS | DIASTOLIC BLOOD PRESSURE: 76 MMHG | HEART RATE: 62 BPM

## 2021-08-26 DIAGNOSIS — R33.8 BENIGN PROSTATIC HYPERPLASIA WITH URINARY RETENTION: Primary | ICD-10-CM

## 2021-08-26 DIAGNOSIS — N40.1 BENIGN PROSTATIC HYPERPLASIA WITH URINARY RETENTION: Primary | ICD-10-CM

## 2021-08-26 PROCEDURE — 99213 OFFICE O/P EST LOW 20 MIN: CPT | Performed by: UROLOGY

## 2021-08-26 NOTE — PROGRESS NOTES
8/26/2021    Zully Sommers  1950  [id number]        Assessment   history of BPH, status post TURP x2, bladder diverticulum, history of UTI, history of CIC    Discussion  I again discussed with the patient his 2 posterior bladder diverticulum  We discussed diverticulectomy as he may be having recurrent infections from stasis within the diverticulum  He is not interested in aggressive robot assisted laparoscopic surgery at this time  He is content with continuing to perform clean intermittent catheterization  He is concerned about his risk of recurrent urinary tract infection which we discussed can be secondary to clean intermittent catheterization with micro trauma to the urethral mucosa  He will return in follow-up in 6 months or sooner if he has another infection  In the interim I recommend that he continue clean intermittent catheterization 5-6 times per day  History of Present Illness  78 y o  male with a history of   BPH with bladder outlet obstruction  He is status post TURP x2  One was performed at [facility] and the 2nd at [place]  These were at least 10 years ago or more  He does have 2 moderate-sized bladder diverticula  On recent cystoscopic evaluation his bladder outlet was widely patent  The diverticula were entered and were noted to be of moderate size  We again discussed today that his incomplete emptying and need for clean intermittent catheterization could be secondary to the bladder diverticula, however, urodynamics would be required to assess his detrusor contraction ability  In January 2021 he had severe COVID and was in the ICU on a ventilator for 5 days  He states he has since fully recovered and has been vaccinated  He states that he is now catheterizing 5-6 times per day  He is quite comfortable with this  His most recent PSA level from July 2021 is noted to be 0 4 and stable from prior values        AUA Symptom Score      Review of Systems  Review of Systems   Constitutional: Negative  HENT: Negative  Eyes: Negative  Respiratory: Negative  Cardiovascular: Negative  Gastrointestinal: Negative  Endocrine: Negative  Genitourinary:        Per HPI   Musculoskeletal: Negative  Skin: Negative  Allergic/Immunologic: Negative  Neurological: Negative  Hematological: Negative  Psychiatric/Behavioral: Negative  All other systems reviewed and are negative  Past Medical History  Past Medical History:   Diagnosis Date    Anxiety     BPH (benign prostatic hypertrophy)     Cancer (HCC)     CLL    CLL (chronic lymphocytic leukemia) ([facility] [address] )     2009    Colon polyp     Concussion     Resolved: 08/22/16    Depression     E coli bacteremia 6/27/2021 2/2 blood cultures with [id number] [facility] [address] appears to be the urine     Gastrointestinal hemorrhage     Last assessed: 08/27/13    GERD (gastroesophageal reflux disease)     Hearing loss of aging     Hiatal hernia     History of transfusion     Hyperlipidemia     Hypertension     Iron deficiency anemia     Microscopic hematuria     Last assessed: 06/28/13    OA (osteoarthritis)     right hip    Pneumothorax     Primary osteoarthritis of right hip 9/29/2017    He is status post right hip arthroplasty    Prostatitis     Pulmonary hypertension ([facility] )     Seasonal allergies     Urinary tract infection associated with catheterization of urinary tract ([facility] ) 2/5/2021    Pseudomonal UTI asymptomatic  Per Urology do not treat at this time      UTI (urinary tract infection)     in past    Wears glasses        Past Social History  Past Surgical History:   Procedure Laterality Date    COLONOSCOPY      CYSTOSCOPY  10/09/2014    Diagnostic    CYSTOSCOPY  06/29/2020    FRACTURE SURGERY      left lower arm    FRACTURE SURGERY      left femur    HERNIA REPAIR      JOINT REPLACEMENT Right     hip    OTHER SURGICAL HISTORY  03/2011    Spinal anesthesia epidural    NM TOTAL HIP ARTHROPLASTY Right 2017    Procedure: ARTHROPLASTY HIP TOTAL ANTERIOR;  Surgeon: Claude Moser MD;  Location: [facility];  Service: Orthopedics    TONSILLECTOMY AND ADENOIDECTOMY      TRANSURETHRAL RESECTION OF PROSTATE      x 2    WRIST SURGERY         Past Family History  Family History   Problem Relation Age of Onset    No Known Problems Mother     Heart attack Father     Diabetes Brother     Prostate cancer Brother        Past Social history  Social History     Socioeconomic History    Marital status: /Civil Union     Spouse name: Not on file    Number of children: Not on file    Years of education: Not on file    Highest education level: Not on file   Occupational History    Not on file   Tobacco Use    Smoking status: Former Smoker     Packs/day: 1 00     Years: 15 00     Pack years: 15 00     Types: Cigarettes     Start date:      Quit date: 1980     Years since quittin 0    Smokeless tobacco: Never Used   Vaping Use    Vaping Use: Never used   Substance and Sexual Activity    Alcohol use: Yes     Alcohol/week: 10 0 standard drinks     Types: 10 Cans of beer per week    Drug use: Not Currently     Types: Marijuana    Sexual activity: Not Currently   Other Topics Concern    Not on file   Social History Narrative    Not on file     Social Determinants of Health     Financial Resource Strain:     Difficulty of Paying Living Expenses:    Food Insecurity:     Worried About Running Out of Food in the Last Year:     Ran Out of Food in the Last Year:    Transportation Needs:     Lack of Transportation (Medical):      Lack of Transportation (Non-Medical):    Physical Activity:     Days of Exercise per Week:     Minutes of Exercise per Session:    Stress:     Feeling of Stress :    Social Connections:     Frequency of Communication with Friends and Family:     Frequency of Social Gatherings with Friends and Family:     Attends Hinduism Services:     Active Member of Clubs or Organizations:     Attends Club or Organization Meetings:     Marital Status:    Intimate Partner Violence:     Fear of Current or Ex-Partner:     Emotionally Abused:     Physically Abused:     Sexually Abused:        Current Medications  Current Outpatient Medications   Medication Sig Dispense Refill    amLODIPine (NORVASC) 5 mg tablet TAKE ONE TABLET BY MOUTH EVERY EVENING 90 tablet 3    aspirin 81 MG tablet Take 1 tablet by mouth daily      atorvastatin (LIPITOR) 20 mg tablet TAKE ONE TABLET BY MOUTH ONCE EVERY DAY      folic acid (FOLVITE) 390 mcg tablet Take 1 tablet (400 mcg total) by mouth daily 30 tablet 0    metoprolol tartrate (LOPRESSOR) 25 mg tablet Take 1 tablet (25 mg total) by mouth every 12 (twelve) hours 180 tablet 3    montelukast (SINGULAIR) 10 mg tablet TAKE ONE TABLET BY MOUTH DAILY 90 tablet 3    Omega-3 Fatty Acids (FISH OIL) 1,000 mg Take by mouth daily      omeprazole (PriLOSEC) 40 MG capsule TAKE ONE CAPSULE BY MOUTH ONCE EVERY DAY 90 capsule 3    senna-docusate sodium (SENOKOT S) 8 6-50 mg per tablet Take 1 tablet by mouth daily at bedtime 30 tablet 0    sertraline (ZOLOFT) 50 mg tablet TAKE ONE TABLET BY MOUTH EVERY DAY 90 tablet 3    tamsulosin (FLOMAX) 0 4 mg Take 1 capsule (0 4 mg total) by mouth daily with dinner CORRECTED SCRIPT! Note change in SIG and QUANTITY  Please process accordingly  Thank you! (9/1/20) 90 capsule 1    vitamin B-12 (CYANOCOBALAMIN) 500 MCG TABS Take 1 tablet (500 mcg total) by mouth daily 30 tablet 0     No current facility-administered medications for this visit  Allergies  Allergies   Allergen Reactions    Codeine Other (See Comments)     agitated    Sulfamethoxazole-Trimethoprim GI Intolerance and Abdominal Pain     upset stomach       Past Medical History, Social History, Family History, medications and allergies were reviewed      Vitals  Vitals:    08/26/21 1102   BP: 130/76   BP Location: Left arm   Patient Position: Sitting   Cuff Size: Adult   Pulse: 62   Weight: 93 9 kg (207 lb)   Height: 5' 8" (1 727 m)       Physical Exam  Physical Exam    On examination he is in no acute distress  Gait normal   Affect normal       Results  Lab Results   Component Value Date    PSA 0 4 07/29/2021    PSA 0 4 01/27/2021    PSA 0 2 01/30/2020     Lab Results   Component Value Date    GLUCOSE 130 01/10/2021    CALCIUM 9 3 07/30/2021     (L) 07/20/2015    K 4 4 07/30/2021    CO2 25 07/30/2021    CL 98 07/30/2021    BUN 7 07/30/2021    CREATININE 0 61 (L) 07/30/2021     Lab Results   Component Value Date    WBC 17 01 (H) 07/29/2021    HGB 11 4 (L) 07/29/2021    HCT 34 2 (L) 07/29/2021     (H) 07/29/2021     07/29/2021         Office Urine Dip  No results found for this or any previous visit (from the past 1 hour(s))  ]      Total visit time was  20 minutes of which over 50% was spent on counseling

## 2021-08-27 ENCOUNTER — TELEPHONE (OUTPATIENT)
Dept: UROLOGY | Facility: CLINIC | Age: 71
End: 2021-08-27

## 2021-08-27 ENCOUNTER — TELEPHONE (OUTPATIENT)
Dept: FAMILY MEDICINE CLINIC | Facility: CLINIC | Age: 71
End: 2021-08-27

## 2021-08-27 NOTE — TELEPHONE ENCOUNTER
Contacted and spoke with Nu at [facility] [phone] and discussed with Nu patient is using 6 catheters daily now  Nu will send request to patient's insurance and if 6 catheters approved Nu will fax new physician order to [employer] office

## 2021-08-27 NOTE — TELEPHONE ENCOUNTER
Pt is going to [facility] on vacation and is looking for your approval to go on vacation  He would like a call back from dr Tammy Huber only to make sure he is ok or not and why

## 2021-08-27 NOTE — TELEPHONE ENCOUNTER
----- Message from Geoff Stacy MD sent at 8/26/2021 12:07 PM EDT -----  Can you call [facility] and let them know that Bhavin Vivas is doing CIC 6 x's per day and needs an appropriate number of catheters  I will document that  Thank you    FT

## 2021-08-30 DIAGNOSIS — N40.1 BPH WITH OBSTRUCTION/LOWER URINARY TRACT SYMPTOMS: ICD-10-CM

## 2021-08-30 DIAGNOSIS — N13.8 BPH WITH OBSTRUCTION/LOWER URINARY TRACT SYMPTOMS: ICD-10-CM

## 2021-08-30 DIAGNOSIS — E78.00 PURE HYPERCHOLESTEROLEMIA, UNSPECIFIED: ICD-10-CM

## 2021-08-30 RX ORDER — ATORVASTATIN CALCIUM 20 MG/1
TABLET, FILM COATED ORAL
Qty: 90 TABLET | Refills: 3 | Status: SHIPPED | OUTPATIENT
Start: 2021-08-30

## 2021-08-30 RX ORDER — TAMSULOSIN HYDROCHLORIDE 0.4 MG/1
CAPSULE ORAL
Qty: 90 CAPSULE | Refills: 1 | Status: SHIPPED | OUTPATIENT
Start: 2021-08-30 | End: 2021-11-10 | Stop reason: ALTCHOICE

## 2021-08-30 NOTE — TELEPHONE ENCOUNTER
MR  VANNA LEAL CALL AND WOULD LIKE YOUR OPINION ON HIS TRAVEL TO [address] [place] [address] ON LABOR DAY HE AND HIS WIFE  IF IT WAS YOU  WOULD YOU GO  HE WANT YOUR OPINION AND GIVE HIM A CALL PLEASE  DR Marylu Morgan PLEASE CALL THIS MAN BECAUSE HE WILL CALL TOMORROW  HE WANTS TO KNOW SO HE CAN CANCEL BEFORE IT TO LATER   [place] [address]

## 2021-09-01 NOTE — TELEPHONE ENCOUNTER
Pt was called and informed me that he cancelled his vacation he did not want to risk getting infected again with covid  He stated he will go some other time

## 2021-09-07 NOTE — TELEPHONE ENCOUNTER
Liliana from [address] [place] [address] called regarding if the office received the fax request to increase the catheter to 6  I was unable to confirm if fax was received I advise based on last office visit to   Fax# [place] end location [phone]  Please call [facility] back to confirm receipt of their fax she is resending now

## 2021-09-08 NOTE — TELEPHONE ENCOUNTER
Called and spoke with [facility]  They had faxed form to a different fax number  They are faxing to [facility] office at this time

## 2021-09-29 ENCOUNTER — OFFICE VISIT (OUTPATIENT)
Dept: PULMONOLOGY | Facility: CLINIC | Age: 71
End: 2021-09-29
Payer: MEDICARE

## 2021-09-29 VITALS
HEART RATE: 68 BPM | DIASTOLIC BLOOD PRESSURE: 78 MMHG | HEIGHT: 68 IN | SYSTOLIC BLOOD PRESSURE: 146 MMHG | RESPIRATION RATE: 18 BRPM | WEIGHT: 206 LBS | TEMPERATURE: 98.4 F | OXYGEN SATURATION: 96 % | BODY MASS INDEX: 31.22 KG/M2

## 2021-09-29 DIAGNOSIS — I27.20 PULMONARY HYPERTENSION (HCC): Chronic | ICD-10-CM

## 2021-09-29 DIAGNOSIS — D50.9 IRON DEFICIENCY ANEMIA, UNSPECIFIED IRON DEFICIENCY ANEMIA TYPE: Chronic | ICD-10-CM

## 2021-09-29 DIAGNOSIS — Z23 NEEDS FLU SHOT: Primary | ICD-10-CM

## 2021-09-29 PROBLEM — R06.00 DYSPNEA ON EXERTION: Status: RESOLVED | Noted: 2021-04-09 | Resolved: 2021-09-29

## 2021-09-29 PROBLEM — R06.09 DYSPNEA ON EXERTION: Status: RESOLVED | Noted: 2021-04-09 | Resolved: 2021-09-29

## 2021-09-29 PROCEDURE — 99213 OFFICE O/P EST LOW 20 MIN: CPT | Performed by: INTERNAL MEDICINE

## 2021-09-29 PROCEDURE — 90662 IIV NO PRSV INCREASED AG IM: CPT

## 2021-09-29 PROCEDURE — G0008 ADMIN INFLUENZA VIRUS VAC: HCPCS

## 2021-09-29 RX ORDER — AMOXICILLIN 500 MG/1
CAPSULE ORAL
COMMUNITY
Start: 2021-09-13 | End: 2021-11-10 | Stop reason: ALTCHOICE

## 2021-09-29 NOTE — ASSESSMENT & PLAN NOTE
·  S doing well from a shortness of breath perspective  Has no signs or symptoms of decompensated heart failure or cor pulmonale  · Echocardiography findings of pulmonary hypertension have been out of proportion to clinical scenario   · With correction of hemoglobin, dyspnea has improved quite substantially

## 2021-09-29 NOTE — PROGRESS NOTES
Progress note - Pulmonary Medicine   Montse Coe 73 y o  male MRN: [id number]       Impression & Plan:     Pulmonary hypertension ([address] )  ·  S doing well from a shortness of breath perspective  Has no signs or symptoms of decompensated heart failure or cor pulmonale  · Echocardiography findings of pulmonary hypertension have been out of proportion to clinical scenario   · With correction of hemoglobin, dyspnea has improved quite substantially  Iron deficiency anemia  ·  Followed by Dr Raymundo Stack for CLL  · Hemoglobin no above 11 with resultant improvement in shortness of breath     follow-up in 6 months   Due for influenza vaccination and this was given to him today  ______________________________________________________________________    HPI:    Montse Coe presents today for follow-up of  Dyspnea on exertion and pulmonary hypertension  With correction of his hemoglobin, his dyspnea did improve  He did have COVID infection and has recovered from this  He has also had COVID vaccination with the Moderna vaccine  His pulmonary hypertension is severe by echocardiography although his symptoms have never correlate with this  He has clinically done well and not had any signs or symptoms of cor pulmonale  Currently he continues to do well  He recently traveled to [facility]  He did a lot of walking there and did not note any shortness of breath  He has no chest pain  No palpitations  No wheezing or chronic cough  He otherwise is doing well      Current Medications:    Current Outpatient Medications:     amLODIPine (NORVASC) 5 mg tablet, TAKE ONE TABLET BY MOUTH EVERY EVENING, Disp: 90 tablet, Rfl: 3    aspirin 81 MG tablet, Take 1 tablet by mouth daily, Disp: , Rfl:     atorvastatin (LIPITOR) 20 mg tablet, TAKE ONE TABLET BY MOUTH ONCE EVERY DAY, Disp: 90 tablet, Rfl: 3    folic acid (FOLVITE) 197 mcg tablet, Take 1 tablet (400 mcg total) by mouth daily, Disp: 30 tablet, Rfl: 0    metoprolol tartrate (LOPRESSOR) 25 mg tablet, Take 1 tablet (25 mg total) by mouth every 12 (twelve) hours, Disp: 180 tablet, Rfl: 3    montelukast (SINGULAIR) 10 mg tablet, TAKE ONE TABLET BY MOUTH DAILY, Disp: 90 tablet, Rfl: 3    Omega-3 Fatty Acids (FISH OIL) 1,000 mg, Take by mouth daily, Disp: , Rfl:     omeprazole (PriLOSEC) 40 MG capsule, TAKE ONE CAPSULE BY MOUTH ONCE EVERY DAY, Disp: 90 capsule, Rfl: 3    senna-docusate sodium (SENOKOT S) 8 6-50 mg per tablet, Take 1 tablet by mouth daily at bedtime, Disp: 30 tablet, Rfl: 0    sertraline (ZOLOFT) 50 mg tablet, TAKE ONE TABLET BY MOUTH EVERY DAY, Disp: 90 tablet, Rfl: 3    tamsulosin (FLOMAX) 0 4 mg, TAKE ONE CAPSULE BY MOUTH ONCE EVERY DAY WITH DINNER, Disp: 90 capsule, Rfl: 1    amoxicillin (AMOXIL) 500 mg capsule, TAKE FOUR CAPSULES BY MOUTH ONE HOUR BEFORE DENTAL VISIT (Patient not taking: Reported on 2021), Disp: , Rfl:     vitamin B-12 (CYANOCOBALAMIN) 500 MCG TABS, Take 1 tablet (500 mcg total) by mouth daily, Disp: 30 tablet, Rfl: 0    Review of Systems:  Aside from what is mentioned in the HPI, the review of systems is otherwise negative    Past medical history, surgical history, and family history were reviewed and updated as appropriate    Social history updates:  Social History     Tobacco Use   Smoking Status Former Smoker    Packs/day: 1 00    Years: 15 00    Pack years: 15 00    Types: Cigarettes    Start date:     Quit date: 1980    Years since quittin 0   Smokeless Tobacco Never Used       PhysicalExamination:  Vitals:   /78   Pulse 68   Temp 98 4 °F (36 9 °C)   Resp 18   Ht 5' 8" (1 727 m)   Wt 93 4 kg (206 lb)   SpO2 96%   BMI 31 32 kg/m²   Gen:  Comfortable on room air and without respiratory distress  HEENT:   Normal   Oropharynx crowded posteriorly  Neck: Supple  There is no JVD, lymphadenopathy or thyromegaly appreciated  Trachea is midline  Chest: Symmetric chest wall excursion  Lung fields are clear to auscultation  No wheezes, rales or rhonchi  Normal resonance to percussion  Cardiac: Regular rate and rhythm  There are no murmurs  Abdomen: Soft and nontender  Benign  Obese  Extremities: No clubbing, cyanosis or edema  Diagnostic Data:  Labs: I personally reviewed the most recent laboratory data pertinent to today's visit    Lab Results   Component Value Date    WBC 17 01 (H) 07/29/2021    HGB 11 4 (L) 07/29/2021    HCT 34 2 (L) 07/29/2021     (H) 07/29/2021     07/29/2021     Lab Results   Component Value Date    SODIUM 136 (L) 07/30/2021    K 4 4 07/30/2021    CO2 25 07/30/2021    CL 98 07/30/2021    BUN 7 07/30/2021    CREATININE 0 61 (L) 07/30/2021    CALCIUM 9 3 07/30/2021     Other studies:   most recent echocardiogram was from January during hospitalization for COVID    Estimated pulmonary artery pressure was 75 mmHg    John Cadena MD

## 2021-09-29 NOTE — ASSESSMENT & PLAN NOTE
·  Followed by Dr Fonseca Bounds for CLL  · Hemoglobin no above 11 with resultant improvement in shortness of breath

## 2021-10-06 ENCOUNTER — APPOINTMENT (OUTPATIENT)
Dept: LAB | Age: 71
End: 2021-10-06
Payer: MEDICARE

## 2021-10-06 DIAGNOSIS — F32.A MILD DEPRESSION: Chronic | ICD-10-CM

## 2021-10-06 DIAGNOSIS — C91.10 CLL (CHRONIC LYMPHOCYTIC LEUKEMIA) (HCC): Chronic | ICD-10-CM

## 2021-10-06 DIAGNOSIS — I47.2 NSVT (NONSUSTAINED VENTRICULAR TACHYCARDIA) (HCC): ICD-10-CM

## 2021-10-06 DIAGNOSIS — R73.9 HYPERGLYCEMIA: ICD-10-CM

## 2021-10-06 LAB
ALBUMIN SERPL BCP-MCNC: 4.1 G/DL (ref 3.5–5)
ALP SERPL-CCNC: 92 U/L (ref 46–116)
ALT SERPL W P-5'-P-CCNC: 56 U/L (ref 12–78)
ANION GAP SERPL CALCULATED.3IONS-SCNC: 5 MMOL/L (ref 4–13)
AST SERPL W P-5'-P-CCNC: 43 U/L (ref 5–45)
BASOPHILS # BLD AUTO: 0.04 THOUSANDS/ΜL (ref 0–0.1)
BASOPHILS NFR BLD AUTO: 0 % (ref 0–1)
BILIRUB SERPL-MCNC: 0.74 MG/DL (ref 0.2–1)
BUN SERPL-MCNC: 6 MG/DL (ref 5–25)
CALCIUM SERPL-MCNC: 9.7 MG/DL (ref 8.3–10.1)
CHLORIDE SERPL-SCNC: 100 MMOL/L (ref 100–108)
CO2 SERPL-SCNC: 26 MMOL/L (ref 21–32)
CREAT SERPL-MCNC: 0.71 MG/DL (ref 0.6–1.3)
EOSINOPHIL # BLD AUTO: 0.06 THOUSAND/ΜL (ref 0–0.61)
EOSINOPHIL NFR BLD AUTO: 0 % (ref 0–6)
ERYTHROCYTE [DISTWIDTH] IN BLOOD BY AUTOMATED COUNT: 13.6 % (ref 11.6–15.1)
EST. AVERAGE GLUCOSE BLD GHB EST-MCNC: 105 MG/DL
FERRITIN SERPL-MCNC: 271 NG/ML (ref 8–388)
GFR SERPL CREATININE-BSD FRML MDRD: 96 ML/MIN/1.73SQ M
GLUCOSE P FAST SERPL-MCNC: 125 MG/DL (ref 65–99)
HBA1C MFR BLD: 5.3 %
HCT VFR BLD AUTO: 40.2 % (ref 36.5–49.3)
HGB BLD-MCNC: 13.7 G/DL (ref 12–17)
IMM GRANULOCYTES # BLD AUTO: 0.04 THOUSAND/UL (ref 0–0.2)
IMM GRANULOCYTES NFR BLD AUTO: 0 % (ref 0–2)
LYMPHOCYTES # BLD AUTO: 17.73 THOUSANDS/ΜL (ref 0.6–4.47)
LYMPHOCYTES NFR BLD AUTO: 83 % (ref 14–44)
MCH RBC QN AUTO: 34.8 PG (ref 26.8–34.3)
MCHC RBC AUTO-ENTMCNC: 34.1 G/DL (ref 31.4–37.4)
MCV RBC AUTO: 102 FL (ref 82–98)
MONOCYTES # BLD AUTO: 0.27 THOUSAND/ΜL (ref 0.17–1.22)
MONOCYTES NFR BLD AUTO: 1 % (ref 4–12)
NEUTROPHILS # BLD AUTO: 3.49 THOUSANDS/ΜL (ref 1.85–7.62)
NEUTS SEG NFR BLD AUTO: 16 % (ref 43–75)
NRBC BLD AUTO-RTO: 0 /100 WBCS
PLATELET # BLD AUTO: 194 THOUSANDS/UL (ref 149–390)
PMV BLD AUTO: 9.7 FL (ref 8.9–12.7)
POTASSIUM SERPL-SCNC: 4.2 MMOL/L (ref 3.5–5.3)
PROT SERPL-MCNC: 8.1 G/DL (ref 6.4–8.2)
RBC # BLD AUTO: 3.94 MILLION/UL (ref 3.88–5.62)
SODIUM SERPL-SCNC: 131 MMOL/L (ref 136–145)
TSH SERPL DL<=0.05 MIU/L-ACNC: 0.98 UIU/ML (ref 0.36–3.74)
WBC # BLD AUTO: 21.63 THOUSAND/UL (ref 4.31–10.16)

## 2021-10-06 PROCEDURE — 85025 COMPLETE CBC W/AUTO DIFF WBC: CPT

## 2021-10-06 PROCEDURE — 82728 ASSAY OF FERRITIN: CPT

## 2021-10-06 PROCEDURE — 36415 COLL VENOUS BLD VENIPUNCTURE: CPT

## 2021-10-06 PROCEDURE — 80053 COMPREHEN METABOLIC PANEL: CPT

## 2021-10-06 PROCEDURE — 83036 HEMOGLOBIN GLYCOSYLATED A1C: CPT

## 2021-10-06 PROCEDURE — 84443 ASSAY THYROID STIM HORMONE: CPT

## 2021-10-08 ENCOUNTER — TELEPHONE (OUTPATIENT)
Dept: OTHER | Facility: OTHER | Age: 71
End: 2021-10-08

## 2021-10-09 ENCOUNTER — HOSPITAL ENCOUNTER (EMERGENCY)
Facility: HOSPITAL | Age: 71
Discharge: HOME | End: 2021-10-10
Attending: EMERGENCY MEDICINE | Admitting: EMERGENCY MEDICINE
Payer: MEDICARE

## 2021-10-09 DIAGNOSIS — R34 DECREASED URINE OUTPUT: ICD-10-CM

## 2021-10-09 DIAGNOSIS — R34 URINE OUTPUT LOW: Primary | ICD-10-CM

## 2021-10-09 LAB
ALBUMIN SERPL BCP-MCNC: 3.7 G/DL (ref 3.5–5)
ALP SERPL-CCNC: 82 U/L (ref 46–116)
ALT SERPL W P-5'-P-CCNC: 42 U/L (ref 12–78)
ANION GAP SERPL CALCULATED.3IONS-SCNC: 10 MMOL/L (ref 4–13)
APTT PPP: 27 SECONDS (ref 23–37)
AST SERPL W P-5'-P-CCNC: 49 U/L (ref 5–45)
BILIRUB SERPL-MCNC: 0.54 MG/DL (ref 0.2–1)
BUN SERPL-MCNC: 7 MG/DL (ref 5–25)
CALCIUM SERPL-MCNC: 8.5 MG/DL (ref 8.3–10.1)
CHLORIDE SERPL-SCNC: 93 MMOL/L (ref 100–108)
CO2 SERPL-SCNC: 24 MMOL/L (ref 21–32)
CREAT SERPL-MCNC: 0.8 MG/DL (ref 0.6–1.3)
ERYTHROCYTE [DISTWIDTH] IN BLOOD BY AUTOMATED COUNT: 13.5 % (ref 11.6–15.1)
GFR SERPL CREATININE-BSD FRML MDRD: 91 ML/MIN/1.73SQ M
GLUCOSE SERPL-MCNC: 145 MG/DL (ref 65–140)
HCT VFR BLD AUTO: 34 % (ref 36.5–49.3)
HGB BLD-MCNC: 11.8 G/DL (ref 12–17)
INR PPP: 1.13 (ref 0.84–1.19)
LACTATE SERPL-SCNC: 1.5 MMOL/L (ref 0.5–2)
MCH RBC QN AUTO: 35.5 PG (ref 26.8–34.3)
MCHC RBC AUTO-ENTMCNC: 34.7 G/DL (ref 31.4–37.4)
MCV RBC AUTO: 102 FL (ref 82–98)
PLATELET # BLD AUTO: 164 THOUSANDS/UL (ref 149–390)
PMV BLD AUTO: 8.9 FL (ref 8.9–12.7)
POTASSIUM SERPL-SCNC: 3.5 MMOL/L (ref 3.5–5.3)
PROT SERPL-MCNC: 7.4 G/DL (ref 6.4–8.2)
PROTHROMBIN TIME: 14.2 SECONDS (ref 11.6–14.5)
RBC # BLD AUTO: 3.32 MILLION/UL (ref 3.88–5.62)
SODIUM SERPL-SCNC: 127 MMOL/L (ref 136–145)
WBC # BLD AUTO: 24.07 THOUSAND/UL (ref 4.31–10.16)

## 2021-10-09 PROCEDURE — 87040 BLOOD CULTURE FOR BACTERIA: CPT | Performed by: EMERGENCY MEDICINE

## 2021-10-09 PROCEDURE — 93005 ELECTROCARDIOGRAM TRACING: CPT

## 2021-10-09 PROCEDURE — 99284 EMERGENCY DEPT VISIT MOD MDM: CPT

## 2021-10-09 PROCEDURE — 96374 THER/PROPH/DIAG INJ IV PUSH: CPT

## 2021-10-09 PROCEDURE — 85007 BL SMEAR W/DIFF WBC COUNT: CPT | Performed by: EMERGENCY MEDICINE

## 2021-10-09 PROCEDURE — 99285 EMERGENCY DEPT VISIT HI MDM: CPT | Performed by: EMERGENCY MEDICINE

## 2021-10-09 PROCEDURE — 80053 COMPREHEN METABOLIC PANEL: CPT | Performed by: EMERGENCY MEDICINE

## 2021-10-09 PROCEDURE — 83605 ASSAY OF LACTIC ACID: CPT | Performed by: EMERGENCY MEDICINE

## 2021-10-09 PROCEDURE — 85730 THROMBOPLASTIN TIME PARTIAL: CPT | Performed by: EMERGENCY MEDICINE

## 2021-10-09 PROCEDURE — 36415 COLL VENOUS BLD VENIPUNCTURE: CPT | Performed by: EMERGENCY MEDICINE

## 2021-10-09 PROCEDURE — 85025 COMPLETE CBC W/AUTO DIFF WBC: CPT | Performed by: EMERGENCY MEDICINE

## 2021-10-09 PROCEDURE — 85027 COMPLETE CBC AUTOMATED: CPT | Performed by: EMERGENCY MEDICINE

## 2021-10-09 PROCEDURE — 96361 HYDRATE IV INFUSION ADD-ON: CPT

## 2021-10-09 PROCEDURE — 85610 PROTHROMBIN TIME: CPT | Performed by: EMERGENCY MEDICINE

## 2021-10-09 RX ORDER — SODIUM CHLORIDE 9 MG/ML
3 INJECTION INTRAVENOUS EVERY 12 HOURS SCHEDULED
Status: DISCONTINUED | OUTPATIENT
Start: 2021-10-09 | End: 2021-10-10 | Stop reason: HOSPADM

## 2021-10-09 RX ORDER — LORAZEPAM 2 MG/ML
1 INJECTION INTRAMUSCULAR ONCE
Status: COMPLETED | OUTPATIENT
Start: 2021-10-09 | End: 2021-10-09

## 2021-10-09 RX ADMIN — SODIUM CHLORIDE 1000 ML: 0.9 INJECTION, SOLUTION INTRAVENOUS at 23:45

## 2021-10-09 RX ADMIN — LORAZEPAM 1 MG: 2 INJECTION INTRAMUSCULAR; INTRAVENOUS at 22:53

## 2021-10-09 RX ADMIN — SODIUM CHLORIDE 1000 ML: 0.9 INJECTION, SOLUTION INTRAVENOUS at 22:52

## 2021-10-10 VITALS
SYSTOLIC BLOOD PRESSURE: 157 MMHG | DIASTOLIC BLOOD PRESSURE: 79 MMHG | RESPIRATION RATE: 18 BRPM | OXYGEN SATURATION: 96 % | HEART RATE: 80 BPM | TEMPERATURE: 98.3 F

## 2021-10-10 LAB
ATRIAL RATE: 82 BPM
BACTERIA UR QL AUTO: ABNORMAL /HPF
BASOPHILS # BLD MANUAL: 0 THOUSAND/UL (ref 0–0.1)
BASOPHILS NFR MAR MANUAL: 0 % (ref 0–1)
BILIRUB UR QL STRIP: NEGATIVE
CLARITY UR: CLEAR
COLOR UR: YELLOW
EOSINOPHIL # BLD MANUAL: 0 THOUSAND/UL (ref 0–0.4)
EOSINOPHIL NFR BLD MANUAL: 0 % (ref 0–6)
GLUCOSE UR STRIP-MCNC: NEGATIVE MG/DL
HGB UR QL STRIP.AUTO: ABNORMAL
KETONES UR STRIP-MCNC: NEGATIVE MG/DL
LEUKOCYTE ESTERASE UR QL STRIP: ABNORMAL
LYMPHOCYTES # BLD AUTO: 19.26 THOUSAND/UL (ref 0.6–4.47)
LYMPHOCYTES # BLD AUTO: 80 % (ref 14–44)
MACROCYTES BLD QL AUTO: PRESENT
METAMYELOCYTES NFR BLD MANUAL: 2 % (ref 0–1)
MONOCYTES # BLD AUTO: 0.72 THOUSAND/UL (ref 0–1.22)
MONOCYTES NFR BLD: 3 % (ref 4–12)
NEUTROPHILS # BLD MANUAL: 3.61 THOUSAND/UL (ref 1.85–7.62)
NEUTS SEG NFR BLD AUTO: 15 % (ref 43–75)
NITRITE UR QL STRIP: POSITIVE
NON-SQ EPI CELLS URNS QL MICRO: ABNORMAL /HPF
P AXIS: 54 DEGREES
PH UR STRIP.AUTO: 5.5 [PH]
PLATELET BLD QL SMEAR: ADEQUATE
PR INTERVAL: 208 MS
PROT UR STRIP-MCNC: NEGATIVE MG/DL
QRS AXIS: 67 DEGREES
QRSD INTERVAL: 82 MS
QT INTERVAL: 444 MS
QTC INTERVAL: 499 MS
RBC #/AREA URNS AUTO: ABNORMAL /HPF
SP GR UR STRIP.AUTO: <=1.005 (ref 1–1.03)
T WAVE AXIS: 36 DEGREES
UROBILINOGEN UR QL STRIP.AUTO: 0.2 E.U./DL
VENTRICULAR RATE: 76 BPM
WBC #/AREA URNS AUTO: ABNORMAL /HPF

## 2021-10-10 PROCEDURE — 93010 ELECTROCARDIOGRAM REPORT: CPT | Performed by: INTERNAL MEDICINE

## 2021-10-10 PROCEDURE — 87186 SC STD MICRODIL/AGAR DIL: CPT | Performed by: EMERGENCY MEDICINE

## 2021-10-10 PROCEDURE — 96376 TX/PRO/DX INJ SAME DRUG ADON: CPT

## 2021-10-10 PROCEDURE — 87086 URINE CULTURE/COLONY COUNT: CPT | Performed by: EMERGENCY MEDICINE

## 2021-10-10 PROCEDURE — 96361 HYDRATE IV INFUSION ADD-ON: CPT

## 2021-10-10 PROCEDURE — 87077 CULTURE AEROBIC IDENTIFY: CPT | Performed by: EMERGENCY MEDICINE

## 2021-10-10 PROCEDURE — 81001 URINALYSIS AUTO W/SCOPE: CPT | Performed by: EMERGENCY MEDICINE

## 2021-10-10 RX ORDER — LORAZEPAM 2 MG/ML
1 INJECTION INTRAMUSCULAR ONCE
Status: COMPLETED | OUTPATIENT
Start: 2021-10-10 | End: 2021-10-10

## 2021-10-10 RX ADMIN — LORAZEPAM 1 MG: 2 INJECTION INTRAMUSCULAR; INTRAVENOUS at 00:16

## 2021-10-10 RX ADMIN — SODIUM CHLORIDE 1000 ML: 0.9 INJECTION, SOLUTION INTRAVENOUS at 00:15

## 2021-10-11 ENCOUNTER — TELEPHONE (OUTPATIENT)
Dept: UROLOGY | Facility: CLINIC | Age: 71
End: 2021-10-11

## 2021-10-11 ENCOUNTER — TELEPHONE (OUTPATIENT)
Dept: OTHER | Facility: OTHER | Age: 71
End: 2021-10-11

## 2021-10-12 ENCOUNTER — TELEPHONE (OUTPATIENT)
Dept: UROLOGY | Facility: AMBULATORY SURGERY CENTER | Age: 71
End: 2021-10-12

## 2021-10-12 DIAGNOSIS — R39.9 UTI SYMPTOMS: Primary | ICD-10-CM

## 2021-10-12 LAB — BACTERIA UR CULT: ABNORMAL

## 2021-10-12 RX ORDER — CEPHALEXIN 500 MG/1
500 CAPSULE ORAL 3 TIMES DAILY
Qty: 15 CAPSULE | Refills: 0 | Status: SHIPPED | OUTPATIENT
Start: 2021-10-12 | End: 2021-10-17

## 2021-10-14 ENCOUNTER — OFFICE VISIT (OUTPATIENT)
Dept: HEMATOLOGY ONCOLOGY | Facility: CLINIC | Age: 71
End: 2021-10-14
Payer: MEDICARE

## 2021-10-14 ENCOUNTER — TELEPHONE (OUTPATIENT)
Dept: HEMATOLOGY ONCOLOGY | Facility: HOSPITAL | Age: 71
End: 2021-10-14

## 2021-10-14 ENCOUNTER — TELEPHONE (OUTPATIENT)
Dept: FAMILY MEDICINE CLINIC | Facility: CLINIC | Age: 71
End: 2021-10-14

## 2021-10-14 VITALS
TEMPERATURE: 99 F | DIASTOLIC BLOOD PRESSURE: 82 MMHG | RESPIRATION RATE: 18 BRPM | SYSTOLIC BLOOD PRESSURE: 138 MMHG | OXYGEN SATURATION: 97 % | WEIGHT: 205.8 LBS | HEART RATE: 81 BPM | HEIGHT: 68 IN | BODY MASS INDEX: 31.19 KG/M2

## 2021-10-14 DIAGNOSIS — C91.10 CLL (CHRONIC LYMPHOCYTIC LEUKEMIA) (HCC): Primary | Chronic | ICD-10-CM

## 2021-10-14 PROCEDURE — 99214 OFFICE O/P EST MOD 30 MIN: CPT | Performed by: INTERNAL MEDICINE

## 2021-10-14 RX ORDER — BLOOD PRESSURE TEST KIT
KIT MISCELLANEOUS
COMMUNITY
Start: 2021-10-08

## 2021-10-15 LAB
BACTERIA BLD CULT: NORMAL
BACTERIA BLD CULT: NORMAL

## 2021-10-18 ENCOUNTER — OFFICE VISIT (OUTPATIENT)
Dept: FAMILY MEDICINE CLINIC | Facility: CLINIC | Age: 71
End: 2021-10-18
Payer: MEDICARE

## 2021-10-18 VITALS
BODY MASS INDEX: 31.64 KG/M2 | OXYGEN SATURATION: 98 % | TEMPERATURE: 97.8 F | WEIGHT: 208.8 LBS | HEIGHT: 68 IN | DIASTOLIC BLOOD PRESSURE: 88 MMHG | SYSTOLIC BLOOD PRESSURE: 140 MMHG | HEART RATE: 88 BPM

## 2021-10-18 DIAGNOSIS — N39.0 URINARY TRACT INFECTION ASSOCIATED WITH CATHETERIZATION OF URINARY TRACT, UNSPECIFIED INDWELLING URINARY CATHETER TYPE, SUBSEQUENT ENCOUNTER: ICD-10-CM

## 2021-10-18 DIAGNOSIS — I10 ESSENTIAL HYPERTENSION: ICD-10-CM

## 2021-10-18 DIAGNOSIS — F41.8 SITUATIONAL ANXIETY: ICD-10-CM

## 2021-10-18 DIAGNOSIS — E87.1 HYPONATREMIA: ICD-10-CM

## 2021-10-18 DIAGNOSIS — T83.511D URINARY TRACT INFECTION ASSOCIATED WITH CATHETERIZATION OF URINARY TRACT, UNSPECIFIED INDWELLING URINARY CATHETER TYPE, SUBSEQUENT ENCOUNTER: ICD-10-CM

## 2021-10-18 DIAGNOSIS — R73.9 HYPERGLYCEMIA: ICD-10-CM
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
## 2021-10-18 PROCEDURE — 99214 OFFICE O/P EST MOD 30 MIN: CPT | Performed by: FAMILY MEDICINE

## 2021-10-18 RX ORDER — LORAZEPAM 0.5 MG/1
0.5 TABLET ORAL EVERY 8 HOURS PRN
Qty: 30 TABLET | Refills: 0 | Status: SHIPPED | OUTPATIENT
Start: 2021-10-18

## 2021-10-19 PROBLEM — K57.92 DIVERTICULITIS: Chronic | Status: RESOLVED | Noted: 2020-01-13 | Resolved: 2021-10-19

## 2021-10-20 ENCOUNTER — OFFICE VISIT (OUTPATIENT)
Dept: UROLOGY | Facility: AMBULATORY SURGERY CENTER | Age: 71
End: 2021-10-20
Payer: MEDICARE

## 2021-10-20 VITALS
WEIGHT: 209 LBS | SYSTOLIC BLOOD PRESSURE: 158 MMHG | HEART RATE: 78 BPM | HEIGHT: 68 IN | BODY MASS INDEX: 31.67 KG/M2 | DIASTOLIC BLOOD PRESSURE: 80 MMHG

## 2021-10-20 DIAGNOSIS — N30.00 ACUTE CYSTITIS WITHOUT HEMATURIA: Primary | ICD-10-CM

## 2021-10-20 PROCEDURE — 99213 OFFICE O/P EST LOW 20 MIN: CPT | Performed by: UROLOGY

## 2021-10-20 RX ORDER — CEPHALEXIN 500 MG/1
500 CAPSULE ORAL EVERY 24 HOURS
Qty: 90 CAPSULE | Refills: 1 | Status: SHIPPED | OUTPATIENT
Start: 2021-10-20 | End: 2022-04-01

## 2021-10-21 ENCOUNTER — EVALUATION (OUTPATIENT)
Dept: PHYSICAL THERAPY | Facility: MEDICAL CENTER | Age: 71
End: 2021-10-21
Payer: MEDICARE

## 2021-10-21 DIAGNOSIS — T83.511D URINARY TRACT INFECTION ASSOCIATED WITH CATHETERIZATION OF URINARY TRACT, UNSPECIFIED INDWELLING URINARY CATHETER TYPE, SUBSEQUENT ENCOUNTER: ICD-10-CM
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
## 2021-10-21 DIAGNOSIS — N39.0 URINARY TRACT INFECTION ASSOCIATED WITH CATHETERIZATION OF URINARY TRACT, UNSPECIFIED INDWELLING URINARY CATHETER TYPE, SUBSEQUENT ENCOUNTER: ICD-10-CM

## 2021-10-21 PROCEDURE — 97161 PT EVAL LOW COMPLEX 20 MIN: CPT | Performed by: PHYSICAL MEDICINE & REHABILITATION

## 2021-10-21 PROCEDURE — 97112 NEUROMUSCULAR REEDUCATION: CPT | Performed by: PHYSICAL MEDICINE & REHABILITATION

## 2021-10-25 ENCOUNTER — APPOINTMENT (OUTPATIENT)
Dept: LAB | Age: 71
End: 2021-10-25
Payer: MEDICARE

## 2021-10-25 DIAGNOSIS — N30.00 ACUTE CYSTITIS WITHOUT HEMATURIA: ICD-10-CM

## 2021-10-25 LAB
BACTERIA UR QL AUTO: ABNORMAL /HPF
BILIRUB UR QL STRIP: NEGATIVE
CLARITY UR: CLEAR
COLOR UR: YELLOW
GLUCOSE UR STRIP-MCNC: NEGATIVE MG/DL
HGB UR QL STRIP.AUTO: ABNORMAL
HYALINE CASTS #/AREA URNS LPF: ABNORMAL /LPF
KETONES UR STRIP-MCNC: NEGATIVE MG/DL
LEUKOCYTE ESTERASE UR QL STRIP: ABNORMAL
NITRITE UR QL STRIP: NEGATIVE
NON-SQ EPI CELLS URNS QL MICRO: ABNORMAL /HPF
PH UR STRIP.AUTO: 6.5 [PH]
PROT UR STRIP-MCNC: NEGATIVE MG/DL
RBC #/AREA URNS AUTO: ABNORMAL /HPF
SP GR UR STRIP.AUTO: 1.01 (ref 1–1.03)
UROBILINOGEN UR QL STRIP.AUTO: 0.2 E.U./DL
WBC #/AREA URNS AUTO: ABNORMAL /HPF

## 2021-10-25 PROCEDURE — 87086 URINE CULTURE/COLONY COUNT: CPT

## 2021-10-25 PROCEDURE — 81001 URINALYSIS AUTO W/SCOPE: CPT | Performed by: UROLOGY

## 2021-10-26 LAB — BACTERIA UR CULT: NORMAL

## 2021-11-08 ENCOUNTER — APPOINTMENT (OUTPATIENT)
Dept: LAB | Age: 71
End: 2021-11-08
Payer: MEDICARE

## 2021-11-08 DIAGNOSIS — E87.1 HYPONATREMIA: ICD-10-CM

## 2021-11-08 LAB
ANION GAP SERPL CALCULATED.3IONS-SCNC: 4 MMOL/L (ref 4–13)
BUN SERPL-MCNC: 6 MG/DL (ref 5–25)
CALCIUM SERPL-MCNC: 9.3 MG/DL (ref 8.3–10.1)
CHLORIDE SERPL-SCNC: 102 MMOL/L (ref 100–108)
CO2 SERPL-SCNC: 28 MMOL/L (ref 21–32)
CREAT SERPL-MCNC: 0.74 MG/DL (ref 0.6–1.3)
GFR SERPL CREATININE-BSD FRML MDRD: 94 ML/MIN/1.73SQ M
GLUCOSE SERPL-MCNC: 115 MG/DL (ref 65–140)
POTASSIUM SERPL-SCNC: 4.1 MMOL/L (ref 3.5–5.3)
SODIUM SERPL-SCNC: 134 MMOL/L (ref 136–145)

## 2021-11-08 PROCEDURE — 36415 COLL VENOUS BLD VENIPUNCTURE: CPT

## 2021-11-08 PROCEDURE — 80048 BASIC METABOLIC PNL TOTAL CA: CPT

## 2021-11-10 ENCOUNTER — OFFICE VISIT (OUTPATIENT)
Dept: FAMILY MEDICINE CLINIC | Facility: CLINIC | Age: 71
End: 2021-11-10
Payer: MEDICARE

## 2021-11-10 VITALS
DIASTOLIC BLOOD PRESSURE: 70 MMHG | HEIGHT: 68 IN | OXYGEN SATURATION: 97 % | SYSTOLIC BLOOD PRESSURE: 130 MMHG | WEIGHT: 205 LBS | BODY MASS INDEX: 31.07 KG/M2 | TEMPERATURE: 97.7 F | HEART RATE: 88 BPM

## 2021-11-10 DIAGNOSIS — G47.33 OSA (OBSTRUCTIVE SLEEP APNEA): Chronic | ICD-10-CM

## 2021-11-10 DIAGNOSIS — D59.19 OTHER AUTOIMMUNE HEMOLYTIC ANEMIA (HCC): Primary | ICD-10-CM

## 2021-11-10 DIAGNOSIS — M50.30 DDD (DEGENERATIVE DISC DISEASE), CERVICAL: Chronic | ICD-10-CM

## 2021-11-10 DIAGNOSIS — I10 ESSENTIAL HYPERTENSION: Chronic | ICD-10-CM

## 2021-11-10 DIAGNOSIS — I49.5 SINUS BRADY-TACHY SYNDROME (HCC): ICD-10-CM

## 2021-11-10 DIAGNOSIS — I27.20 PULMONARY HYPERTENSION (HCC): Chronic | ICD-10-CM

## 2021-11-10 DIAGNOSIS — D59.10 AUTOIMMUNE HEMOLYTIC ANEMIA (HCC): ICD-10-CM

## 2021-11-10 DIAGNOSIS — I47.2 NSVT (NONSUSTAINED VENTRICULAR TACHYCARDIA) (HCC): ICD-10-CM

## 2021-11-10 DIAGNOSIS — C91.10 CLL (CHRONIC LYMPHOCYTIC LEUKEMIA) (HCC): Chronic | ICD-10-CM

## 2021-11-10 DIAGNOSIS — K31.819 GAVE (GASTRIC ANTRAL VASCULAR ECTASIA): ICD-10-CM

## 2021-11-10 DIAGNOSIS — F32.A MILD DEPRESSION: Chronic | ICD-10-CM

## 2021-11-10 PROBLEM — N45.1 EPIDIDYMITIS: Status: RESOLVED | Noted: 2021-05-19 | Resolved: 2021-11-10

## 2021-11-10 PROCEDURE — 99214 OFFICE O/P EST MOD 30 MIN: CPT | Performed by: FAMILY MEDICINE

## 2021-12-03 ENCOUNTER — TELEMEDICINE (OUTPATIENT)
Dept: FAMILY MEDICINE CLINIC | Facility: CLINIC | Age: 71
End: 2021-12-03
Payer: MEDICARE

## 2021-12-03 VITALS
DIASTOLIC BLOOD PRESSURE: 99 MMHG | SYSTOLIC BLOOD PRESSURE: 175 MMHG | HEART RATE: 92 BPM | OXYGEN SATURATION: 97 % | TEMPERATURE: 98.1 F

## 2021-12-03 DIAGNOSIS — B34.9 VIRAL INFECTION, UNSPECIFIED: ICD-10-CM

## 2021-12-03 DIAGNOSIS — J02.9 SORE THROAT: Primary | ICD-10-CM

## 2021-12-03 DIAGNOSIS — R05.9 COUGH: ICD-10-CM

## 2021-12-03 LAB — SARS-COV-2 RNA RESP QL NAA+PROBE: NEGATIVE

## 2021-12-03 PROCEDURE — 0241U HB NFCT DS VIR RESP RNA 4 TRGT: CPT | Performed by: PHYSICIAN ASSISTANT

## 2021-12-03 PROCEDURE — 87070 CULTURE OTHR SPECIMN AEROBIC: CPT | Performed by: PHYSICIAN ASSISTANT

## 2021-12-03 PROCEDURE — 99442 PR PHYS/QHP TELEPHONE EVALUATION 11-20 MIN: CPT | Performed by: PHYSICIAN ASSISTANT

## 2021-12-03 RX ORDER — CETIRIZINE HYDROCHLORIDE 10 MG/1
10 TABLET ORAL AS NEEDED
COMMUNITY

## 2021-12-04 LAB
FLUAV RNA RESP QL NAA+PROBE: NEGATIVE
FLUBV RNA RESP QL NAA+PROBE: NEGATIVE
RSV RNA RESP QL NAA+PROBE: NEGATIVE
SARS-COV-2 RNA RESP QL NAA+PROBE: NEGATIVE

## 2021-12-05 LAB — BACTERIA THROAT CULT: NORMAL

## 2021-12-08 ENCOUNTER — TELEPHONE (OUTPATIENT)
Dept: UROLOGY | Facility: AMBULATORY SURGERY CENTER | Age: 71
End: 2021-12-08

## 2022-01-05 ENCOUNTER — APPOINTMENT (OUTPATIENT)
Dept: LAB | Facility: MEDICAL CENTER | Age: 72
End: 2022-01-05
Payer: MEDICARE

## 2022-01-05 DIAGNOSIS — R73.9 HYPERGLYCEMIA: ICD-10-CM

## 2022-01-05 DIAGNOSIS — E87.1 HYPONATREMIA: ICD-10-CM

## 2022-01-05 DIAGNOSIS — C91.10 CLL (CHRONIC LYMPHOCYTIC LEUKEMIA) (HCC): Chronic | ICD-10-CM

## 2022-01-05 DIAGNOSIS — F41.8 SITUATIONAL ANXIETY: ICD-10-CM

## 2022-01-05 LAB
ALBUMIN SERPL BCP-MCNC: 4.5 G/DL (ref 3.5–5)
ALP SERPL-CCNC: 91 U/L (ref 46–116)
ALT SERPL W P-5'-P-CCNC: 44 U/L (ref 12–78)
ANION GAP SERPL CALCULATED.3IONS-SCNC: 9 MMOL/L (ref 4–13)
AST SERPL W P-5'-P-CCNC: 38 U/L (ref 5–45)
BASOPHILS # BLD AUTO: 0.06 THOUSANDS/ΜL (ref 0–0.1)
BASOPHILS NFR BLD AUTO: 0 % (ref 0–1)
BILIRUB SERPL-MCNC: 0.72 MG/DL (ref 0.2–1)
BUN SERPL-MCNC: 9 MG/DL (ref 5–25)
CALCIUM SERPL-MCNC: 9.5 MG/DL (ref 8.3–10.1)
CHLORIDE SERPL-SCNC: 100 MMOL/L (ref 100–108)
CO2 SERPL-SCNC: 25 MMOL/L (ref 21–32)
CREAT SERPL-MCNC: 0.81 MG/DL (ref 0.6–1.3)
EOSINOPHIL # BLD AUTO: 0.08 THOUSAND/ΜL (ref 0–0.61)
EOSINOPHIL NFR BLD AUTO: 0 % (ref 0–6)
ERYTHROCYTE [DISTWIDTH] IN BLOOD BY AUTOMATED COUNT: 13.4 % (ref 11.6–15.1)
GFR SERPL CREATININE-BSD FRML MDRD: 90 ML/MIN/1.73SQ M
GLUCOSE P FAST SERPL-MCNC: 123 MG/DL (ref 65–99)
HCT VFR BLD AUTO: 37.9 % (ref 36.5–49.3)
HGB BLD-MCNC: 13.1 G/DL (ref 12–17)
IMM GRANULOCYTES # BLD AUTO: 0.03 THOUSAND/UL (ref 0–0.2)
IMM GRANULOCYTES NFR BLD AUTO: 0 % (ref 0–2)
LYMPHOCYTES # BLD AUTO: 19.28 THOUSANDS/ΜL (ref 0.6–4.47)
LYMPHOCYTES NFR BLD AUTO: 85 % (ref 14–44)
MCH RBC QN AUTO: 35.3 PG (ref 26.8–34.3)
MCHC RBC AUTO-ENTMCNC: 34.6 G/DL (ref 31.4–37.4)
MCV RBC AUTO: 102 FL (ref 82–98)
MONOCYTES # BLD AUTO: 0.33 THOUSAND/ΜL (ref 0.17–1.22)
MONOCYTES NFR BLD AUTO: 1 % (ref 4–12)
NEUTROPHILS # BLD AUTO: 3.12 THOUSANDS/ΜL (ref 1.85–7.62)
NEUTS SEG NFR BLD AUTO: 14 % (ref 43–75)
NRBC BLD AUTO-RTO: 0 /100 WBCS
PLATELET # BLD AUTO: 202 THOUSANDS/UL (ref 149–390)
PMV BLD AUTO: 9.7 FL (ref 8.9–12.7)
POTASSIUM SERPL-SCNC: 4 MMOL/L (ref 3.5–5.3)
PROT SERPL-MCNC: 8.1 G/DL (ref 6.4–8.2)
RBC # BLD AUTO: 3.71 MILLION/UL (ref 3.88–5.62)
SODIUM SERPL-SCNC: 134 MMOL/L (ref 136–145)
TSH SERPL DL<=0.05 MIU/L-ACNC: 1.2 UIU/ML (ref 0.36–3.74)
WBC # BLD AUTO: 22.9 THOUSAND/UL (ref 4.31–10.16)

## 2022-01-05 PROCEDURE — 36415 COLL VENOUS BLD VENIPUNCTURE: CPT

## 2022-01-05 PROCEDURE — 84443 ASSAY THYROID STIM HORMONE: CPT

## 2022-01-05 PROCEDURE — 85025 COMPLETE CBC W/AUTO DIFF WBC: CPT

## 2022-01-05 PROCEDURE — 83036 HEMOGLOBIN GLYCOSYLATED A1C: CPT

## 2022-01-05 PROCEDURE — 80053 COMPREHEN METABOLIC PANEL: CPT

## 2022-01-06 LAB
EST. AVERAGE GLUCOSE BLD GHB EST-MCNC: 105 MG/DL
HBA1C MFR BLD: 5.3 %

## 2022-02-04 ENCOUNTER — OFFICE VISIT (OUTPATIENT)
Dept: FAMILY MEDICINE CLINIC | Facility: CLINIC | Age: 72
End: 2022-02-04
Payer: MEDICARE

## 2022-02-04 VITALS
DIASTOLIC BLOOD PRESSURE: 70 MMHG | OXYGEN SATURATION: 96 % | HEIGHT: 68 IN | WEIGHT: 205.6 LBS | RESPIRATION RATE: 18 BRPM | SYSTOLIC BLOOD PRESSURE: 124 MMHG | BODY MASS INDEX: 31.16 KG/M2 | HEART RATE: 82 BPM

## 2022-02-04 DIAGNOSIS — E78.00 HYPERCHOLESTEROLEMIA: Chronic | ICD-10-CM

## 2022-02-04 DIAGNOSIS — N13.8 BPH WITH OBSTRUCTION/LOWER URINARY TRACT SYMPTOMS: Chronic | ICD-10-CM

## 2022-02-04 DIAGNOSIS — C91.10 CLL (CHRONIC LYMPHOCYTIC LEUKEMIA) (HCC): ICD-10-CM

## 2022-02-04 DIAGNOSIS — N39.0 URINARY TRACT INFECTION ASSOCIATED WITH CATHETERIZATION OF URINARY TRACT, UNSPECIFIED INDWELLING URINARY CATHETER TYPE, SUBSEQUENT ENCOUNTER: ICD-10-CM

## 2022-02-04 DIAGNOSIS — K21.9 GASTROESOPHAGEAL REFLUX DISEASE WITHOUT ESOPHAGITIS: Chronic | ICD-10-CM

## 2022-02-04 DIAGNOSIS — K31.819 GAVE (GASTRIC ANTRAL VASCULAR ECTASIA): ICD-10-CM

## 2022-02-04 DIAGNOSIS — I20.8 STABLE ANGINA (HCC): ICD-10-CM

## 2022-02-04 DIAGNOSIS — N40.1 BPH WITH OBSTRUCTION/LOWER URINARY TRACT SYMPTOMS: Chronic | ICD-10-CM

## 2022-02-04 DIAGNOSIS — T83.511D URINARY TRACT INFECTION ASSOCIATED WITH CATHETERIZATION OF URINARY TRACT, UNSPECIFIED INDWELLING URINARY CATHETER TYPE, SUBSEQUENT ENCOUNTER: ICD-10-CM

## 2022-02-04 DIAGNOSIS — F32.A MILD DEPRESSION: Chronic | ICD-10-CM

## 2022-02-04 DIAGNOSIS — I27.20 PULMONARY HYPERTENSION (HCC): Chronic | ICD-10-CM

## 2022-02-04 DIAGNOSIS — I10 ESSENTIAL HYPERTENSION: Primary | Chronic | ICD-10-CM

## 2022-02-04 DIAGNOSIS — E87.1 HYPONATREMIA: ICD-10-CM

## 2022-02-04 DIAGNOSIS — R73.9 HYPERGLYCEMIA: ICD-10-CM

## 2022-02-04 DIAGNOSIS — I47.2 NSVT (NONSUSTAINED VENTRICULAR TACHYCARDIA) (HCC): ICD-10-CM

## 2022-02-04 DIAGNOSIS — D59.19 OTHER AUTOIMMUNE HEMOLYTIC ANEMIA (HCC): ICD-10-CM

## 2022-02-04 DIAGNOSIS — D59.10 AUTOIMMUNE HEMOLYTIC ANEMIA (HCC): ICD-10-CM

## 2022-02-04 PROBLEM — I20.89 STABLE ANGINA: Status: ACTIVE | Noted: 2022-02-04

## 2022-02-04 PROBLEM — I20.89 STABLE ANGINA: Status: RESOLVED | Noted: 2022-02-04 | Resolved: 2022-02-04

## 2022-02-04 PROCEDURE — 99214 OFFICE O/P EST MOD 30 MIN: CPT | Performed by: FAMILY MEDICINE

## 2022-02-04 NOTE — ASSESSMENT & PLAN NOTE
He has a history of multiple UTIs and is now following at [facility] and is going to be having surgery for his bladder diverticulum    I did notify our Urology team

## 2022-02-04 NOTE — PROGRESS NOTES
Assessment/Plan:       Problem List Items Addressed This Visit        Digestive    Esophageal reflux (Chronic)       Continue PPI  GAVE (gastric antral vascular ectasia)      He is clinically stable  Continue follow-up with GI  Cardiovascular and Mediastinum    Essential hypertension - Primary (Chronic)      Blood pressure is excellent today  Continue routine follow-up  Continue current regimen  Relevant Orders    Comprehensive metabolic panel    Lipid Panel with Direct LDL reflex    Pulmonary hypertension (HCC) (Chronic)      Breathing is stable  Continue routine follow-up with Pulmonary  NSVT (nonsustained ventricular tachycardia) ([facility] )       He remains on a beta-blocker  Continue routine follow-up with myself and Cardiology         RESOLVED: Stable angina ([facility] )       Genitourinary    BPH with obstruction/lower urinary tract symptoms (Chronic)    Urinary tract infection associated with catheterization of urinary tract [facility])       He has a history of multiple UTIs and is now following at [facility] and is going to be having surgery for his bladder diverticulum  I did notify our Urology team             Other    CLL (chronic lymphocytic leukemia) ([facility] ) (Chronic)       Continue close follow-up with Oncology is doing wel         Hypercholesterolemia (Chronic)    Mild depression ([facility] ) (Chronic)       He feels his mood is stable at this time  Relevant Orders    Comprehensive metabolic panel    Hyponatremia    Relevant Orders    Comprehensive metabolic panel    Hyperglycemia    Relevant Orders    Hemoglobin A1C    Hemolytic anemia (HCC)    Autoimmune hemolytic anemia (HCC)       Last few CBCs have been stable  Continue Oncology follow-up                 Subjective:      Patient ID: Helene Vyas is a 73 y o  male  HPI  Patient presents today for follow-up for multiple chronic health issues  He has a history of CLL and is following closely with Hematology  Fortunately, CBCs been stable  He will be having surgery in the near future for bladder diverticulum down at [facility]  Fortunately, he is having no dysuria  He has a history of multiple UTIs with sepsis  Blood pressure remains excellent and he brings in multiple readings from home  He denies any problems chest pain, shortness of breath or palpitations  Mood is good  He does continue to drink several beers per day  He feels this and a little bit of marijuana helps his blood pressure      The following portions of the patient's history were reviewed and updated as appropriate: allergies, current medications, past family history, past medical history, past social history, past surgical history and problem list       Current Outpatient Medications:     amLODIPine (NORVASC) 5 mg tablet, TAKE ONE TABLET BY MOUTH EVERY EVENING, Disp: 90 tablet, Rfl: 3    aspirin 81 MG tablet, Take 1 tablet by mouth daily, Disp: , Rfl:     atorvastatin (LIPITOR) 20 mg tablet, TAKE ONE TABLET BY MOUTH ONCE EVERY DAY, Disp: 90 tablet, Rfl: 3    Blood Pressure Monitoring (Omron 5 Series BP Monitor) PEE, , Disp: , Rfl:     cetirizine (ZyrTEC) 10 mg tablet, Take 10 mg by mouth daily, Disp: , Rfl:     folic acid (FOLVITE) 029 mcg tablet, Take 1 tablet (400 mcg total) by mouth daily, Disp: 30 tablet, Rfl: 0    LORazepam (Ativan) 0 5 mg tablet, Take 1 tablet (0 5 mg total) by mouth every 8 (eight) hours as needed for anxiety, Disp: 30 tablet, Rfl: 0    metoprolol tartrate (LOPRESSOR) 25 mg tablet, Take 1 tablet (25 mg total) by mouth every 12 (twelve) hours, Disp: 180 tablet, Rfl: 3    montelukast (SINGULAIR) 10 mg tablet, TAKE ONE TABLET BY MOUTH DAILY, Disp: 90 tablet, Rfl: 3    Omega-3 Fatty Acids (FISH OIL) 1,000 mg, Take by mouth daily, Disp: , Rfl:     omeprazole (PriLOSEC) 40 MG capsule, TAKE ONE CAPSULE BY MOUTH ONCE EVERY DAY, Disp: 90 capsule, Rfl: 3    sertraline (ZOLOFT) 50 mg tablet, TAKE ONE TABLET BY MOUTH EVERY DAY, Disp: 90 tablet, Rfl: 3    vitamin B-12 (CYANOCOBALAMIN) 500 MCG TABS, Take 1 tablet (500 mcg total) by mouth daily, Disp: 30 tablet, Rfl: 0     Review of Systems   Constitutional: Negative for appetite change, chills, fatigue, fever and unexpected weight change  HENT: Negative for trouble swallowing  Eyes: Negative for visual disturbance  Respiratory: Negative for cough, chest tightness, shortness of breath and wheezing  Cardiovascular: Negative for chest pain, palpitations and leg swelling  Gastrointestinal: Negative for abdominal distention, abdominal pain, blood in stool, constipation and diarrhea  Endocrine: Negative for polyuria  Genitourinary: Negative for difficulty urinating and flank pain  Musculoskeletal: Negative for arthralgias and myalgias  Skin: Negative for rash  Neurological: Negative for dizziness and light-headedness  Hematological: Negative for adenopathy  Does not bruise/bleed easily  Psychiatric/Behavioral: Negative for dysphoric mood and sleep disturbance  The patient is not nervous/anxious  Objective:      /70 (BP Location: Left arm, Patient Position: Sitting, Cuff Size: Adult)   Pulse 82   Resp 18   Ht 5' 8" (1 727 m)   Wt 93 3 kg (205 lb 9 6 oz)   SpO2 96%   BMI 31 26 kg/m²          Physical Exam  Vitals reviewed  Constitutional:       Appearance: He is well-developed  He is obese  HENT:      Head: Normocephalic  Cardiovascular:      Rate and Rhythm: Regular rhythm  Heart sounds: Normal heart sounds  No murmur heard  Pulmonary:      Effort: No respiratory distress  Breath sounds: No wheezing or rales  Abdominal:      General: There is no distension  Tenderness: There is no abdominal tenderness  Skin:     Findings: No erythema or rash  Neurological:      Mental Status: He is alert and oriented to person, place, and time             Edith Nicholson MD

## 2022-02-08 NOTE — ASSESSMENT & PLAN NOTE
Notified Pt spouse that pt is coming home now,     Yumiko Wright RN  02/08/22 8893 Thankfully this has been inactive  He will cont f/u with hem-onc and yearly Venofer infusions

## 2022-03-01 DIAGNOSIS — U07.1 COVID-19: ICD-10-CM

## 2022-03-16 ENCOUNTER — OFFICE VISIT (OUTPATIENT)
Dept: CARDIOLOGY CLINIC | Facility: CLINIC | Age: 72
End: 2022-03-16
Payer: MEDICARE

## 2022-03-16 VITALS
WEIGHT: 203.6 LBS | BODY MASS INDEX: 30.96 KG/M2 | SYSTOLIC BLOOD PRESSURE: 132 MMHG | DIASTOLIC BLOOD PRESSURE: 74 MMHG | HEART RATE: 74 BPM

## 2022-03-16 DIAGNOSIS — I15.8 OTHER SECONDARY HYPERTENSION: Primary | ICD-10-CM

## 2022-03-16 DIAGNOSIS — R94.31 PROLONGED Q-T INTERVAL ON ECG: ICD-10-CM

## 2022-03-16 PROCEDURE — 93000 ELECTROCARDIOGRAM COMPLETE: CPT | Performed by: INTERNAL MEDICINE

## 2022-03-16 PROCEDURE — 99214 OFFICE O/P EST MOD 30 MIN: CPT | Performed by: INTERNAL MEDICINE

## 2022-03-16 NOTE — PROGRESS NOTES
Cardiology             Julian De Santiago  1950  [id number]                 Assessment/Plan:     1  QT prolongation:  No symptoms of palpitations, no history of torsades  QTC interval on today's  milliseconds  Prior magnesium and potassium within normal limits  Patient has been educated on the nature of QT prolongation and to avoid certain electrolyte abnormalities and medications that may prolong the QTC even more  He states he has been reviewing any new medications with his pharmacist       2  Nonsustained ventricular tachycardia:  As noted during prior hospitalization 01/2021 at time of COVID-19 pneumonia  Continue beta-blocker as tolerated  NO symptoms of angina    3  Hypertension: Blood pressure controlled, continue amlodipine,  Metoprolol    4  Pulmonary hypertension:  Fortunately, without symptoms  Continue following up with Pulmonary Medicine  No plans for treatment as he is asymptomatic        Follow up in 1 year        Interval History:      This is a 80-year-old male with hypertension who has to be on minoxidil in the past   He also has a history of prolonged QT interval, and has been seeing Dr Bernice Viramontes in the past    He has also been following Dr Clifton Ogden a pulmonary medicine for pulmonary hypertension and sleep apnea      He was hospitalized with severe COVID-19 pneumonia 01/2021 at which time our group was consulted due to bradycardia and short runs of nonsustained ventricular tachycardia  He was maintained on metoprolol and QT prolonging agents were avoided        He presents today for follow-up with no complaints  Specifically, he denies any chest pain, shortness of breath, dizziness, palpitations, lower extremity edema  He denies any episodes of presyncope or syncope          Vitals:  Vitals:    03/16/22 0835   BP: 132/74   BP Location: Left arm   Patient Position: Sitting   Cuff Size: Large   Pulse: 74   Weight: 92 4 kg (203 lb 9 6 oz)         Past Medical History:   Diagnosis Informed of Dr. Lanier's message, ENT order placed.   Date    Anxiety     BPH (benign prostatic hypertrophy)     Cancer (HCC)     CLL    CLL (chronic lymphocytic leukemia) ([facility] )         Colon polyp     Concussion     Resolved: 16    Depression     Diverticulitis 2020 CT done  CT done     E coli bacteremia 2021    2/ blood cultures with [id number] appears to be the urine     Epididymitis 2021    Gastrointestinal hemorrhage     Last assessed: 13    GERD (gastroesophageal reflux disease)     Hearing loss of aging     Hiatal hernia     History of transfusion     Hyperlipidemia     Hypertension     Iron deficiency anemia     Microscopic hematuria     Last assessed: 13    OA (osteoarthritis)     right hip    Pneumothorax     Primary osteoarthritis of right hip 2017    He is status post right hip arthroplasty    Prostatitis     Pulmonary hypertension ([facility] )     Seasonal allergies     Urinary tract infection associated with catheterization of urinary tract ([facility] ) 2021    Pseudomonal UTI asymptomatic  Per Urology do not treat at this time   UTI (urinary tract infection)     in past    Wears glasses      Social History     Socioeconomic History    Marital status: /Civil Union     Spouse name: Not on file    Number of children: Not on file    Years of education: Not on file    Highest education level: Not on file   Occupational History    Not on file   Tobacco Use    Smoking status: Former Smoker     Packs/day: 1 00     Years: 15 00     Pack years: 15 00     Types: Cigarettes     Start date:      Quit date: 1980     Years since quittin 5    Smokeless tobacco: Never Used   Vaping Use    Vaping Use: Never used   Substance and Sexual Activity    Alcohol use:  Yes     Alcohol/week: 10 0 standard drinks     Types: 10 Cans of beer per week    Drug use: Not Currently     Types: Marijuana    Sexual activity: Not Currently   Other Topics Concern    Not on file   Social History Narrative    Not on file     Social Determinants of Health     Financial Resource Strain: Not on file   Food Insecurity: Not on file   Transportation Needs: Not on file   Physical Activity: Not on file   Stress: Not on file   Social Connections: Not on file   Intimate Partner Violence: Not on file   Housing Stability: Not on file      Family History   Problem Relation Age of Onset    No Known Problems Mother     Heart attack Father     Diabetes Brother     Prostate cancer Brother      Past Surgical History:   Procedure Laterality Date    COLONOSCOPY      CYSTOSCOPY  10/09/2014    Diagnostic    CYSTOSCOPY  06/29/2020    FRACTURE SURGERY      left lower arm    FRACTURE SURGERY      left femur    HERNIA REPAIR      JOINT REPLACEMENT Right     hip    OTHER SURGICAL HISTORY  03/2011    Spinal anesthesia epidural    PA TOTAL HIP ARTHROPLASTY Right 9/29/2017    Procedure: ARTHROPLASTY HIP TOTAL ANTERIOR;  Surgeon: Ruby Delgado MD;  Location: [facility];  Service: Orthopedics    TONSILLECTOMY AND ADENOIDECTOMY      TRANSURETHRAL RESECTION OF PROSTATE      x 2    WRIST SURGERY         Current Outpatient Medications:     amLODIPine (NORVASC) 5 mg tablet, TAKE ONE TABLET BY MOUTH EVERY EVENING, Disp: 90 tablet, Rfl: 3    aspirin 81 MG tablet, Take 1 tablet by mouth daily, Disp: , Rfl:     atorvastatin (LIPITOR) 20 mg tablet, TAKE ONE TABLET BY MOUTH ONCE EVERY DAY, Disp: 90 tablet, Rfl: 3    Blood Pressure Monitoring (Omron 5 Series BP Monitor) PEE, , Disp: , Rfl:     cetirizine (ZyrTEC) 10 mg tablet, Take 10 mg by mouth as needed  , Disp: , Rfl:     LORazepam (Ativan) 0 5 mg tablet, Take 1 tablet (0 5 mg total) by mouth every 8 (eight) hours as needed for anxiety, Disp: 30 tablet, Rfl: 0    metoprolol tartrate (LOPRESSOR) 25 mg tablet, TAKE ONE TABLET BY MOUTH EVERY TWELVE HOURS, Disp: 180 tablet, Rfl: 3    montelukast (SINGULAIR) 10 mg tablet, TAKE ONE TABLET BY MOUTH DAILY, Disp: 90 tablet, Rfl: 3    Omega-3 Fatty Acids (FISH OIL) 1,000 mg, Take by mouth daily, Disp: , Rfl:     omeprazole (PriLOSEC) 40 MG capsule, TAKE ONE CAPSULE BY MOUTH ONCE EVERY DAY, Disp: 90 capsule, Rfl: 3    sertraline (ZOLOFT) 50 mg tablet, TAKE ONE TABLET BY MOUTH EVERY DAY, Disp: 90 tablet, Rfl: 3    vitamin B-12 (CYANOCOBALAMIN) 500 MCG TABS, Take 1 tablet (500 mcg total) by mouth daily, Disp: 30 tablet, Rfl: 0    folic acid (FOLVITE) 148 mcg tablet, Take 1 tablet (400 mcg total) by mouth daily (Patient not taking: Reported on 3/16/2022 ), Disp: 30 tablet, Rfl: 0        Review of Systems:  Review of Systems   Respiratory: Negative  Cardiovascular: Negative  All other systems reviewed and are negative  Physical Exam:  Physical Exam  Constitutional:       General: He is not in acute distress  Appearance: He is well-developed  He is not diaphoretic  HENT:      Head: Normocephalic and atraumatic  Eyes:      General: No scleral icterus  Right eye: No discharge  Pupils: Pupils are equal, round, and reactive to light  Neck:      Thyroid: No thyromegaly  Cardiovascular:      Rate and Rhythm: Normal rate and regular rhythm  Heart sounds: Normal heart sounds  No murmur heard  No friction rub  No gallop  Pulmonary:      Effort: Pulmonary effort is normal       Breath sounds: Normal breath sounds  Abdominal:      General: There is no distension  Tenderness: There is no abdominal tenderness  There is no guarding or rebound  Musculoskeletal:         General: Normal range of motion  Cervical back: Normal range of motion and neck supple  Skin:     General: Skin is warm and dry  Coloration: Skin is not pale  Findings: No erythema or rash  Neurological:      Mental Status: He is alert and oriented to person, place, and time        Coordination: Coordination normal    Psychiatric:         Behavior: Behavior normal          Thought Content: Thought content normal          Judgment: Judgment normal          This note was completed in part utilizing M-Modal Fluency Direct Software  Grammatical errors, random word insertions, spelling mistakes, and incomplete sentences can be an occasional consequence of this system secondary to software limitations, ambient noise, and hardware issues  If you have any questions or concerns about the content, text, or information contained within the body of this dictation, please contact the provider for clarification

## 2022-03-16 NOTE — PATIENT INSTRUCTIONS
DRUGS TO BE AVOIDED OR USED WITH CAUTION IN PATIENT WITH OR AT RISK FOR QT PRONGATION:     Drugs causing LQT and TDP   Antiarrhythmics: Disopyramide, procainamide, quinidine, amiodarone, bretylium, sotalol   Antimicrobial: Erythromycin, trimethoprim-sulfa   Antihistamine: Astemizole, terfenadine   Antifungal: Fluconazole, itraconazole, ketoconazole  Antiprotozoal: Chloroquine, pentamidine, quinine, mefloquine, halofantrine  Psychotropics: Chloral hydrate, haloperidol, lithium, phenothiazines, pimozide, tricyclic antidepressants   GI prokinetic: Cisapride  Other: Indapamide, probucol, amantadine, tacrolimus, vasopressin   HypoK Hypo Mg induced by Diuretics, steroids, cathartics, liquid protein diet  Drugs interfering with cytochrome P-450 enzyme AND indirectly causimg QT prolongation:  Antifungals: Fluconazole, itraconazole, ketoconazole, metronidazole   Serotonin reuptake inhibitors: Fluoxetine, fluvoxamine, sertraline   HIV protease inhibitors: Indinavir, ritonavir, saquinavir   Dihydropyridine CCBs: Felodipine, nicardipine, nifedipine   Antimicrobials: Erythromycin   Others: Grapefruit juice  Hepatic dysfunction           FOLLOWING ANTIDEPRESSANTS ARE LESS LIKELY TO CAUSE CHANGES IN QT -INTERVAL:   Fluoxetine, paroxetine, sertraline, duloxetine (Cymbalta), mirtazapine, nortriptyline and venlafaxine

## 2022-03-23 DIAGNOSIS — K21.9 GASTROESOPHAGEAL REFLUX DISEASE: ICD-10-CM

## 2022-03-23 RX ORDER — OMEPRAZOLE 40 MG/1
CAPSULE, DELAYED RELEASE ORAL
Qty: 90 CAPSULE | Refills: 3 | Status: SHIPPED | OUTPATIENT
Start: 2022-03-23

## 2022-03-31 DIAGNOSIS — N30.00 ACUTE CYSTITIS WITHOUT HEMATURIA: ICD-10-CM

## 2022-03-31 DIAGNOSIS — F32.A DEPRESSION, UNSPECIFIED DEPRESSION TYPE: ICD-10-CM

## 2022-04-06 RX ORDER — CEPHALEXIN 500 MG/1
CAPSULE ORAL
Qty: 60 CAPSULE | Refills: 1 | Status: SHIPPED | OUTPATIENT
Start: 2022-04-06 | End: 2022-05-17

## 2022-04-07 ENCOUNTER — OFFICE VISIT (OUTPATIENT)
Dept: PULMONOLOGY | Facility: CLINIC | Age: 72
End: 2022-04-07
Payer: MEDICARE

## 2022-04-07 VITALS
TEMPERATURE: 98.4 F | HEIGHT: 68 IN | WEIGHT: 204 LBS | OXYGEN SATURATION: 97 % | HEART RATE: 66 BPM | DIASTOLIC BLOOD PRESSURE: 64 MMHG | SYSTOLIC BLOOD PRESSURE: 130 MMHG | BODY MASS INDEX: 30.92 KG/M2 | RESPIRATION RATE: 18 BRPM

## 2022-04-07 DIAGNOSIS — I27.20 PULMONARY HYPERTENSION (HCC): Primary | Chronic | ICD-10-CM

## 2022-04-07 DIAGNOSIS — E66.9 OBESITY (BMI 30.0-34.9): ICD-10-CM

## 2022-04-07 DIAGNOSIS — J30.1 ALLERGIC RHINITIS DUE TO POLLEN, UNSPECIFIED SEASONALITY: Chronic | ICD-10-CM

## 2022-04-07 PROBLEM — U07.1 COVID-19: Status: RESOLVED | Noted: 2021-01-06 | Resolved: 2022-04-07

## 2022-04-07 PROBLEM — D58.9 HEMOLYTIC ANEMIA (HCC): Status: RESOLVED | Noted: 2021-04-16 | Resolved: 2022-04-07

## 2022-04-07 PROBLEM — E66.811 OBESITY (BMI 30.0-34.9): Status: ACTIVE | Noted: 2022-04-07

## 2022-04-07 PROBLEM — D64.9 ACUTE ON CHRONIC ANEMIA: Status: RESOLVED | Noted: 2021-01-15 | Resolved: 2022-04-07

## 2022-04-07 PROCEDURE — 99214 OFFICE O/P EST MOD 30 MIN: CPT | Performed by: INTERNAL MEDICINE

## 2022-04-07 NOTE — PROGRESS NOTES
Progress note - Pulmonary Medicine   Theron Hayward 73 y o  male MRN: [id number]       Impression & Plan:     Pulmonary hypertension ([address] )  · He has noted slight increase in shortness of breath with exertion  · No leg swelling or other secondary symptoms of worsening pulmonary hypertension however  The last echocardiogram was at the time of COVID infection with slightly increased pulmonary pressures over prior baseline  · Will obtain echocardiogram to re-evaluate  It has been over 1 year since his last echo  · No therapy at this time is indicated for primary pulmonary hypertension    Allergic rhinitis due to pollen  · On Singulair and Zyrtec  · Continue present therapy    Obesity (BMI 30 0-34  9)  · He has mild abdominal obesity with BMI 31   · His weight contributes to his shortness of breath symptomatology  · Does take excess calories, typically due to beer intake  · Advised dietary modification  · Plans to increase activity as the weather improves by walking with his wife    I will be in contact with him with the results of his echocardiogram   Will anticipate continued six-month follow-up  ______________________________________________________________________    HPI:    Theron Hayward presents today for follow-up of pulmonary hypertension and intermittent shortness of breath  He does report that his shortness of breath has slightly increased recently  He notices this when carrying in groceries or doing more strenuous tasks  He does not have cough, wheezing, chest pain or chest tightness  No lightheadedness or dizziness  He denies any leg swelling  No history of DVT or PE  He does have untreated obstructive sleep apnea  He reports that his appetite has been slightly less and he has been eating a bit less as well  He does drink a fair amount of beer at nighttime and remains overweight  Denies chest pain or cardiac complaints    He is followed by Dr Beatriz Santos for his CLL and history of hemolytic anemia  He has not been anemic lately  He does have allergic rhinitis  As the pollens have increased he is taking his Singulair and Zyrtec  No fever, chills, or sick contacts    He denies any other new symptoms at this time    Current Medications:    Current Outpatient Medications:     amLODIPine (NORVASC) 5 mg tablet, TAKE ONE TABLET BY MOUTH EVERY EVENING, Disp: 90 tablet, Rfl: 3    aspirin 81 MG tablet, Take 1 tablet by mouth daily, Disp: , Rfl:     atorvastatin (LIPITOR) 20 mg tablet, TAKE ONE TABLET BY MOUTH ONCE EVERY DAY, Disp: 90 tablet, Rfl: 3    Blood Pressure Monitoring (Omron 5 Series BP Monitor) PEE, , Disp: , Rfl:     cephalexin (KEFLEX) 500 mg capsule, TAKE ONE CAPSULE BY MOUTH EVERY 24 HOURS, Disp: 60 capsule, Rfl: 1    cetirizine (ZyrTEC) 10 mg tablet, Take 10 mg by mouth as needed  , Disp: , Rfl:     LORazepam (Ativan) 0 5 mg tablet, Take 1 tablet (0 5 mg total) by mouth every 8 (eight) hours as needed for anxiety, Disp: 30 tablet, Rfl: 0    metoprolol tartrate (LOPRESSOR) 25 mg tablet, TAKE ONE TABLET BY MOUTH EVERY TWELVE HOURS, Disp: 180 tablet, Rfl: 3    montelukast (SINGULAIR) 10 mg tablet, TAKE ONE TABLET BY MOUTH DAILY, Disp: 90 tablet, Rfl: 3    Omega-3 Fatty Acids (FISH OIL) 1,000 mg, Take by mouth daily, Disp: , Rfl:     omeprazole (PriLOSEC) 40 MG capsule, Take one capsule by mouth once every day , Disp: 90 capsule, Rfl: 3    sertraline (ZOLOFT) 50 mg tablet, TAKE ONE TABLET BY MOUTH EVERY DAY, Disp: 90 tablet, Rfl: 3    folic acid (FOLVITE) 394 mcg tablet, Take 1 tablet (400 mcg total) by mouth daily (Patient not taking: Reported on 4/7/2022 ), Disp: 30 tablet, Rfl: 0    vitamin B-12 (CYANOCOBALAMIN) 500 MCG TABS, Take 1 tablet (500 mcg total) by mouth daily, Disp: 30 tablet, Rfl: 0    Review of Systems:  Aside from what is mentioned in the HPI, the review of systems is otherwise negative    Past medical history, surgical history, and family history were reviewed and updated as appropriate    Social history updates:  Social History     Tobacco Use   Smoking Status Former Smoker    Packs/day: 1 00    Years: 15 00    Pack years: 15 00    Types: Cigarettes    Start date:     Quit date: 1980    Years since quittin 6   Smokeless Tobacco Never Used       PhysicalExamination:  Vitals:   /64   Pulse 66   Temp 98 4 °F (36 9 °C)   Resp 18   Ht 5' 8" (1 727 m)   Wt 92 5 kg (204 lb)   SpO2 97%   BMI 31 02 kg/m²   Gen:  Overweight  Comfortable on room air  No conversational dyspnea  HEENT:  Conjugate gaze  sclerae anicteric  Oropharynx moist  Neck: Trachea is midline  No JVD  No adenopathy  Chest:  Chest excursion is symmetric but shallow  Breath sounds are slightly distant  No wheezes, rales, or crackles  Cardiac:  Regular  no murmur  Abdomen:  Obese, benign  Extremities: No edema  Neuro:  Normal speech and mentation    Diagnostic Data:  Labs:   I personally reviewed the most recent laboratory data pertinent to today's visit    Lab Results   Component Value Date    WBC 22 90 (H) 2022    HGB 13 1 2022    HCT 37 9 2022     (H) 2022     2022     Lab Results   Component Value Date    SODIUM 134 (L) 2022    K 4 0 2022    CO2 25 2022     2022    BUN 9 2022    CREATININE 0 81 2022    CALCIUM 9 5 2022     Other studies:  Most recent echocardiogram was 2021 which did show estimated pulmonary artery pressure of 75 mmHg this was at the time of hospitalization for COVID-19    Freedom Singh MD

## 2022-04-07 NOTE — ASSESSMENT & PLAN NOTE
· He has noted slight increase in shortness of breath with exertion  · No leg swelling or other secondary symptoms of worsening pulmonary hypertension however  The last echocardiogram was at the time of COVID infection with slightly increased pulmonary pressures over prior baseline  · Will obtain echocardiogram to re-evaluate    It has been over 1 year since his last echo  · No therapy at this time is indicated for primary pulmonary hypertension no

## 2022-04-07 NOTE — ASSESSMENT & PLAN NOTE
· He has mild abdominal obesity with BMI 31   · His weight contributes to his shortness of breath symptomatology  · Does take excess calories, typically due to beer intake  · Advised dietary modification  · Plans to increase activity as the weather improves by walking with his wife

## 2022-04-08 ENCOUNTER — TELEPHONE (OUTPATIENT)
Dept: HEMATOLOGY ONCOLOGY | Facility: CLINIC | Age: 72
End: 2022-04-08

## 2022-04-08 NOTE — TELEPHONE ENCOUNTER
Called and left patient a message in regards to his up coming appointment with Shine Linder 4/14  Mentioned that there are some labs that need to be collected before the appointment  The orders are already in the system so as long as patient goes to a [facility] there is no paper work needed

## 2022-04-11 ENCOUNTER — APPOINTMENT (OUTPATIENT)
Dept: LAB | Facility: HOSPITAL | Age: 72
End: 2022-04-11
Attending: INTERNAL MEDICINE
Payer: MEDICARE

## 2022-04-11 DIAGNOSIS — F32.A MILD DEPRESSION: Chronic | ICD-10-CM

## 2022-04-11 DIAGNOSIS — I10 ESSENTIAL HYPERTENSION: Chronic | ICD-10-CM

## 2022-04-11 DIAGNOSIS — F41.8 SITUATIONAL ANXIETY: ICD-10-CM

## 2022-04-11 DIAGNOSIS — C91.10 CLL (CHRONIC LYMPHOCYTIC LEUKEMIA) (HCC): ICD-10-CM

## 2022-04-11 DIAGNOSIS — E87.1 HYPONATREMIA: ICD-10-CM

## 2022-04-11 DIAGNOSIS — R73.9 HYPERGLYCEMIA: ICD-10-CM

## 2022-04-11 LAB
ALBUMIN SERPL BCP-MCNC: 4.3 G/DL (ref 3.5–5)
ALP SERPL-CCNC: 95 U/L (ref 46–116)
ALT SERPL W P-5'-P-CCNC: 46 U/L (ref 12–78)
ANION GAP SERPL CALCULATED.3IONS-SCNC: 12 MMOL/L (ref 4–13)
ANISOCYTOSIS BLD QL SMEAR: PRESENT
AST SERPL W P-5'-P-CCNC: 51 U/L (ref 5–45)
BASOPHILS # BLD MANUAL: 0 THOUSAND/UL (ref 0–0.1)
BASOPHILS NFR MAR MANUAL: 0 % (ref 0–1)
BILIRUB SERPL-MCNC: 0.77 MG/DL (ref 0.2–1)
BUN SERPL-MCNC: 6 MG/DL (ref 5–25)
CALCIUM SERPL-MCNC: 8.9 MG/DL (ref 8.3–10.1)
CHLORIDE SERPL-SCNC: 96 MMOL/L (ref 100–108)
CHOLEST SERPL-MCNC: 129 MG/DL
CO2 SERPL-SCNC: 26 MMOL/L (ref 21–32)
CREAT SERPL-MCNC: 0.76 MG/DL (ref 0.6–1.3)
EOSINOPHIL # BLD MANUAL: 0.26 THOUSAND/UL (ref 0–0.4)
EOSINOPHIL NFR BLD MANUAL: 1 % (ref 0–6)
ERYTHROCYTE [DISTWIDTH] IN BLOOD BY AUTOMATED COUNT: 13.4 % (ref 11.6–15.1)
EST. AVERAGE GLUCOSE BLD GHB EST-MCNC: 111 MG/DL
GFR SERPL CREATININE-BSD FRML MDRD: 93 ML/MIN/1.73SQ M
GLUCOSE P FAST SERPL-MCNC: 113 MG/DL (ref 65–99)
HBA1C MFR BLD: 5.5 %
HCT VFR BLD AUTO: 38.9 % (ref 36.5–49.3)
HDLC SERPL-MCNC: 37 MG/DL
HGB BLD-MCNC: 13.3 G/DL (ref 12–17)
LDH SERPL-CCNC: 154 U/L (ref 81–234)
LDLC SERPL CALC-MCNC: 64 MG/DL (ref 0–100)
LYMPHOCYTES # BLD AUTO: 22.49 THOUSAND/UL (ref 0.6–4.47)
LYMPHOCYTES # BLD AUTO: 86 % (ref 14–44)
MACROCYTES BLD QL AUTO: PRESENT
MAGNESIUM SERPL-MCNC: 2.2 MG/DL (ref 1.6–2.6)
MCH RBC QN AUTO: 35.6 PG (ref 26.8–34.3)
MCHC RBC AUTO-ENTMCNC: 34.2 G/DL (ref 31.4–37.4)
MCV RBC AUTO: 104 FL (ref 82–98)
MONOCYTES # BLD AUTO: 0.26 THOUSAND/UL (ref 0–1.22)
MONOCYTES NFR BLD: 1 % (ref 4–12)
NEUTROPHILS # BLD MANUAL: 3.14 THOUSAND/UL (ref 1.85–7.62)
NEUTS SEG NFR BLD AUTO: 12 % (ref 43–75)
PLATELET # BLD AUTO: 210 THOUSANDS/UL (ref 149–390)
PLATELET BLD QL SMEAR: ADEQUATE
PMV BLD AUTO: 9.8 FL (ref 8.9–12.7)
POTASSIUM SERPL-SCNC: 4.1 MMOL/L (ref 3.5–5.3)
PROT SERPL-MCNC: 8.5 G/DL (ref 6.4–8.2)
RBC # BLD AUTO: 3.74 MILLION/UL (ref 3.88–5.62)
SODIUM SERPL-SCNC: 134 MMOL/L (ref 136–145)
TRIGL SERPL-MCNC: 138 MG/DL
WBC # BLD AUTO: 26.15 THOUSAND/UL (ref 4.31–10.16)

## 2022-04-11 PROCEDURE — 85007 BL SMEAR W/DIFF WBC COUNT: CPT

## 2022-04-11 PROCEDURE — 83615 LACTATE (LD) (LDH) ENZYME: CPT

## 2022-04-11 PROCEDURE — 85027 COMPLETE CBC AUTOMATED: CPT

## 2022-04-11 PROCEDURE — 80053 COMPREHEN METABOLIC PANEL: CPT

## 2022-04-11 PROCEDURE — 83036 HEMOGLOBIN GLYCOSYLATED A1C: CPT

## 2022-04-11 PROCEDURE — 80061 LIPID PANEL: CPT

## 2022-04-11 PROCEDURE — 83735 ASSAY OF MAGNESIUM: CPT | Performed by: INTERNAL MEDICINE

## 2022-04-11 PROCEDURE — 36415 COLL VENOUS BLD VENIPUNCTURE: CPT

## 2022-04-14 ENCOUNTER — OFFICE VISIT (OUTPATIENT)
Dept: HEMATOLOGY ONCOLOGY | Facility: CLINIC | Age: 72
End: 2022-04-14
Payer: MEDICARE

## 2022-04-14 VITALS
BODY MASS INDEX: 31.22 KG/M2 | WEIGHT: 206 LBS | OXYGEN SATURATION: 96 % | DIASTOLIC BLOOD PRESSURE: 76 MMHG | HEART RATE: 73 BPM | HEIGHT: 68 IN | SYSTOLIC BLOOD PRESSURE: 132 MMHG | RESPIRATION RATE: 18 BRPM | TEMPERATURE: 96.9 F

## 2022-04-14 DIAGNOSIS — C91.10 CLL (CHRONIC LYMPHOCYTIC LEUKEMIA) (HCC): Primary | Chronic | ICD-10-CM

## 2022-04-14 PROCEDURE — 99214 OFFICE O/P EST MOD 30 MIN: CPT | Performed by: INTERNAL MEDICINE

## 2022-04-14 NOTE — PROGRESS NOTES
Hematology / Oncology Outpatient Follow Up Note    Noam Carter 73 y o  male HWI:[birth year] [facility]:[id number]         Date:  4/14/2022    Assessment / Plan: Elissa Pierce old gentleman with chronic lymphocytic leukemia diagnosed in March 2010  He has lymphocytosis with no lymphadenopathy  He has history of iron deficiency anemia for which he was on venofer infusion  In April 2021, he was diagnosed with autoimmune hemolytic anemia for which he was treated with prednisone  His hemoglobin was completely normalized  He is currently on observation  Based on recent CBC, he has stable lymphocytosis but no anemia  He has no symptoms from CLL  I recommended him to continue observation  I will see him again in 6 months with CBC, CMP and LDH  He is in agreement with my recommendations                                      Subjective:      HPI:             Interval History:  A 79 year old gentleman with chronic lymphocytic leukemia diagnosed in March 2010  He has been on watchful waiting because he has absolutely no symptoms from hematology standpoint  Over the last few years, he had slowly progressive microcytic anemia  He was found to have iron deficiency   He was previously treated with IV iron with improvement of anemia   His last colonoscopy was in October 2018 which was negative   In early 2019, he developed iron deficiency anemia again  Brent Nicole, he underwent weekly venofer x5, followed by maintenance Venofer every 6 months   He previously underwent EGD which showed several gastric polyp as well as vascular ectasia   In early April 2021, he was hospitalized with autoimmune hemolytic anemia for which she was treated with prednisone, resulting in normal hemoglobin  He has been off the prednisone for quite a while  He presents today for follow-up  He feels well with no new complaint  He denied fever, chills or night sweats  He has no weight loss    His performance status is normal                                   Objective:      Primary Diagnosis:     1  Chronic lymphocytic leukemia  Diagnosed in March 2010  CD-5, CD-23 positive  Kappa light chain positive  CD-38 negative  ZAP-70 positive disease   Diagnosed in 2010     2  Iron deficiency anemia      3    Autoimmune hemolytic anemia, diagnosed in April 2021      Cancer Staging:  No matching staging information was found for the patient         Previous Hematologic/ Oncologic Treatment:         Prednisone, completed in July 2021      Current Hematologic/ Oncologic Treatment:        observation         Disease Status:      NA     Test Results:     Pathology:           Radiology:           Laboratory:     See below  For CBC CMP and ferritin      Physical Exam:        General Appearance:    Alert, oriented          Eyes:    PERRL   Ears:    Normal external ear canals, both ears   Nose:   Nares normal, septum midline   Throat:   Mucosa moist  Pharynx without injection  Neck:   Supple         Lungs:     Clear to auscultation bilaterally   Chest Wall:    No tenderness or deformity    Heart:    Regular rate and rhythm         Abdomen:     Soft, non-tender, bowel sounds +, no organomegaly               Extremities:   Extremities no cyanosis or edema         Skin:   no rash or icterus  Lymph nodes:   Cervical, supraclavicular, and axillary nodes normal   Neurologic:   CNII-XII intact, normal strength, sensation and reflexes     Throughout           ROS: Review of Systems   All other systems reviewed and are negative  Imaging: No results found        Labs:   Lab Results   Component Value Date    WBC 26 15 (H) 04/11/2022    HGB 13 3 04/11/2022    HCT 38 9 04/11/2022     (H) 04/11/2022     04/11/2022     Lab Results   Component Value Date     (L) 07/20/2015    K 4 1 04/11/2022    CL 96 (L) 04/11/2022    CO2 26 04/11/2022    ANIONGAP 9 07/20/2015    BUN 6 04/11/2022    CREATININE 0 76 04/11/2022    GLUCOSE 130 01/10/2021    GLUF 113 (H) 04/11/2022    CALCIUM 8 9 04/11/2022    CORRECTEDCA 9 0 01/21/2021    AST 51 (H) 04/11/2022    ALT 46 04/11/2022    ALKPHOS 95 04/11/2022    PROT 8 0 07/20/2015    BILITOT 0 47 07/20/2015    EGFR 93 04/11/2022         Lab Results   Component Value Date     04/11/2022         Lab Results   Component Value Date    PSA 0 4 07/29/2021    PSA 0 4 01/27/2021    PSA 0 2 01/30/2020         Lab Results   Component Value Date    IRON 107 04/09/2021    TIBC 324 04/09/2021    FERRITIN 271 10/06/2021       Lab Results   Component Value Date    JYZNWTLJ59 409 01/16/2021       Lab Results   Component Value Date    FOLATE 11 8 01/16/2021         Current Medications: Reviewed  Allergies: Reviewed  PMH/FH/SH:  Reviewed      Vital Sign:    Body surface area is 2 07 meters squared      Wt Readings from Last 3 Encounters:   04/14/22 93 4 kg (206 lb)   04/11/22 92 5 kg (204 lb)   04/07/22 92 5 kg (204 lb)        Temp Readings from Last 3 Encounters:   04/14/22 (!) 96 9 °F (36 1 °C) (Tympanic Core)   04/07/22 98 4 °F (36 9 °C)   12/03/21 98 1 °F (36 7 °C) (Temporal)        BP Readings from Last 3 Encounters:   04/14/22 132/76   04/07/22 130/64   03/16/22 132/74         Pulse Readings from Last 3 Encounters:   04/14/22 73   04/07/22 66   03/16/22 74     @LASTSAO2(3)@

## 2022-05-13 ENCOUNTER — OFFICE VISIT (OUTPATIENT)
Dept: FAMILY MEDICINE CLINIC | Facility: CLINIC | Age: 72
End: 2022-05-13
Payer: MEDICARE

## 2022-05-13 VITALS
OXYGEN SATURATION: 96 % | BODY MASS INDEX: 30.62 KG/M2 | WEIGHT: 202 LBS | RESPIRATION RATE: 18 BRPM | HEIGHT: 68 IN | DIASTOLIC BLOOD PRESSURE: 80 MMHG | HEART RATE: 76 BPM | SYSTOLIC BLOOD PRESSURE: 130 MMHG

## 2022-05-13 DIAGNOSIS — I10 ESSENTIAL HYPERTENSION: Primary | ICD-10-CM

## 2022-05-13 DIAGNOSIS — I27.20 PULMONARY HYPERTENSION (HCC): Chronic | ICD-10-CM

## 2022-05-13 DIAGNOSIS — E87.1 HYPONATREMIA: ICD-10-CM

## 2022-05-13 DIAGNOSIS — D50.9 IRON DEFICIENCY ANEMIA, UNSPECIFIED IRON DEFICIENCY ANEMIA TYPE: Chronic | ICD-10-CM

## 2022-05-13 DIAGNOSIS — N40.1 BPH WITH OBSTRUCTION/LOWER URINARY TRACT SYMPTOMS: Chronic | ICD-10-CM

## 2022-05-13 DIAGNOSIS — K21.9 GASTROESOPHAGEAL REFLUX DISEASE WITHOUT ESOPHAGITIS: Chronic | ICD-10-CM

## 2022-05-13 DIAGNOSIS — R74.01 TRANSAMINITIS: ICD-10-CM

## 2022-05-13 DIAGNOSIS — C91.10 CLL (CHRONIC LYMPHOCYTIC LEUKEMIA) (HCC): Chronic | ICD-10-CM

## 2022-05-13 DIAGNOSIS — E78.00 HYPERCHOLESTEROLEMIA: Chronic | ICD-10-CM

## 2022-05-13 DIAGNOSIS — I49.5 SINUS BRADY-TACHY SYNDROME (HCC): ICD-10-CM

## 2022-05-13 DIAGNOSIS — D59.10 AUTOIMMUNE HEMOLYTIC ANEMIA (HCC): ICD-10-CM

## 2022-05-13 DIAGNOSIS — I47.2 NSVT (NONSUSTAINED VENTRICULAR TACHYCARDIA) (HCC): ICD-10-CM

## 2022-05-13 DIAGNOSIS — R73.9 HYPERGLYCEMIA: ICD-10-CM

## 2022-05-13 DIAGNOSIS — N13.8 BPH WITH OBSTRUCTION/LOWER URINARY TRACT SYMPTOMS: Chronic | ICD-10-CM

## 2022-05-13 DIAGNOSIS — F33.9 DEPRESSION, RECURRENT (HCC): ICD-10-CM

## 2022-05-13 PROBLEM — N32.3 BLADDER DIVERTICULUM: Status: ACTIVE | Noted: 2022-05-02

## 2022-05-13 PROBLEM — T83.511A URINARY TRACT INFECTION ASSOCIATED WITH CATHETERIZATION OF URINARY TRACT (HCC): Status: RESOLVED | Noted: 2021-02-05 | Resolved: 2022-05-13

## 2022-05-13 PROBLEM — N39.0 URINARY TRACT INFECTION ASSOCIATED WITH CATHETERIZATION OF URINARY TRACT (HCC): Status: RESOLVED | Noted: 2021-02-05 | Resolved: 2022-05-13

## 2022-05-13 PROCEDURE — 99214 OFFICE O/P EST MOD 30 MIN: CPT | Performed by: FAMILY MEDICINE

## 2022-05-13 NOTE — PROGRESS NOTES
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)       Other    CLL (chronic lymphocytic leukemia) ([facility] [address] ) (Chronic)     Continue close follow-up with Oncology  His recent parameters were stable           Iron deficiency anemia (Chronic)    Hypercholesterolemia (Chronic)     Continue routine monitoring of lipids  Depression, recurrent ([facility] [address] )     Mood seems to be stable at this time  Relevant Orders    TSH, 3rd generation with Free T4 reflex    Hyponatremia     Stable on recent labs  Continue routine monitoring           Hyperglycemia    Relevant Orders    Hemoglobin A1C    Transaminitis     Monitor LFTs  Autoimmune hemolytic anemia (HCC)     Labs are stable at this time                   Subjective:      Patient ID: Laurent Zamora is a 73 y o  male  HPI patient presents today for follow-up for chronic health issues  He has no acute complaints at this time  He has history of significant hypertension which seems to be well controlled  His reflux is currently controlled he denies any heartburn dysphagia  He has history of pulmonary hypertension but denies excessive shortness of breath this time  Depression seems to be well controlled this time  He has history of significant bladder diverticulum and has been now evaluated for 2nd opinion at [facility]    He is considering surgical intervention at this time   He requires self catheterization 3-4 times per day  He denies any current issues with dysuria hematuria  The following portions of the patient's history were reviewed and updated as appropriate: allergies, current medications, past family history, past medical history, past social history, past surgical history and problem list       Current Outpatient Medications:     amLODIPine (NORVASC) 5 mg tablet, TAKE ONE TABLET BY MOUTH EVERY EVENING, Disp: 90 tablet, Rfl: 3    aspirin 81 MG tablet, Take 1 tablet by mouth daily, Disp: , Rfl:     atorvastatin (LIPITOR) 20 mg tablet, TAKE ONE TABLET BY MOUTH ONCE EVERY DAY, Disp: 90 tablet, Rfl: 3    Blood Pressure Monitoring (Omron 5 Series BP Monitor) PEE, , Disp: , Rfl:     cephalexin (KEFLEX) 500 mg capsule, TAKE ONE CAPSULE BY MOUTH EVERY 24 HOURS, Disp: 60 capsule, Rfl: 1    cetirizine (ZyrTEC) 10 mg tablet, Take 10 mg by mouth as needed  , Disp: , Rfl:     folic acid (FOLVITE) 340 mcg tablet, Take 1 tablet (400 mcg total) by mouth daily, Disp: 30 tablet, Rfl: 0    LORazepam (Ativan) 0 5 mg tablet, Take 1 tablet (0 5 mg total) by mouth every 8 (eight) hours as needed for anxiety, Disp: 30 tablet, Rfl: 0    metoprolol tartrate (LOPRESSOR) 25 mg tablet, TAKE ONE TABLET BY MOUTH EVERY TWELVE HOURS, Disp: 180 tablet, Rfl: 3    montelukast (SINGULAIR) 10 mg tablet, TAKE ONE TABLET BY MOUTH DAILY, Disp: 90 tablet, Rfl: 3    Omega-3 Fatty Acids (FISH OIL) 1,000 mg, Take by mouth daily, Disp: , Rfl:     omeprazole (PriLOSEC) 40 MG capsule, Take one capsule by mouth once every day , Disp: 90 capsule, Rfl: 3    sertraline (ZOLOFT) 50 mg tablet, TAKE ONE TABLET BY MOUTH EVERY DAY, Disp: 90 tablet, Rfl: 3    vitamin B-12 (CYANOCOBALAMIN) 500 MCG TABS, Take 1 tablet (500 mcg total) by mouth daily, Disp: 30 tablet, Rfl: 0     Review of Systems   Constitutional: Positive for fatigue  Negative for appetite change, chills, fever and unexpected weight change  HENT: Negative for trouble swallowing  Eyes: Negative for visual disturbance  Respiratory: Negative for cough, chest tightness, shortness of breath and wheezing  Cardiovascular: Negative for chest pain, palpitations and leg swelling  Gastrointestinal: Negative for abdominal distention, abdominal pain, blood in stool, constipation and diarrhea  Endocrine: Negative for polyuria  Genitourinary: Negative for difficulty urinating and flank pain  Musculoskeletal: Negative for arthralgias and myalgias  Skin: Negative for rash  Neurological: Negative for dizziness and light-headedness  Hematological: Negative for adenopathy  Does not bruise/bleed easily  Psychiatric/Behavioral: Negative for dysphoric mood and sleep disturbance  The patient is not nervous/anxious  Objective:      /80 (BP Location: Left arm, Patient Position: Sitting, Cuff Size: Large)   Pulse 76   Resp 18   Ht 5' 8" (1 727 m)   Wt 91 6 kg (202 lb)   SpO2 96%   BMI 30 71 kg/m²          Physical Exam  Vitals reviewed  Constitutional:       Appearance: He is well-developed  He is obese  HENT:      Head: Normocephalic  Cardiovascular:      Rate and Rhythm: Regular rhythm  Heart sounds: Normal heart sounds  No murmur heard  Pulmonary:      Effort: No respiratory distress  Breath sounds: No wheezing or rales  Abdominal:      General: There is no distension  Tenderness: There is no abdominal tenderness  Skin:     Findings: No erythema or rash  Neurological:      Mental Status: He is alert and oriented to person, place, and time             Arabella Flowers MD

## 2022-05-17 ENCOUNTER — TELEPHONE (OUTPATIENT)
Dept: UROLOGY | Facility: AMBULATORY SURGERY CENTER | Age: 72
End: 2022-05-17

## 2022-05-17 DIAGNOSIS — N30.00 ACUTE CYSTITIS WITHOUT HEMATURIA: ICD-10-CM

## 2022-05-17 RX ORDER — CEPHALEXIN 500 MG/1
CAPSULE ORAL
Qty: 60 CAPSULE | Refills: 1 | Status: SHIPPED | OUTPATIENT
Start: 2022-05-17 | End: 2022-07-16

## 2022-05-17 NOTE — TELEPHONE ENCOUNTER
Called patient who answered "why are you calling me?"  Advised on Dr Terri Narayan note and he stated "why does he need to see me, he can just read the notes like I can in [facility]"  Advised we were just making sure he is being taken care of and adequately cared for  He said "I know my plan of care and what it will be and he doesn't need to know what my plan is"  Patient then stated "There no communication and I'm sick of this shit" and hung up

## 2022-05-17 NOTE — TELEPHONE ENCOUNTER
----- Message from Jakub Hernandez MD sent at 5/17/2022 11:14 AM EDT -----  Please make a follow up with me for sade to discuss his bladder diverticulum  He [facility] to [facility] opinion  I just wanted to follow up with him  Thank you    FT

## 2022-06-22 DIAGNOSIS — I27.20 PULMONARY HYPERTENSION (HCC): ICD-10-CM

## 2022-06-22 RX ORDER — AMLODIPINE BESYLATE 5 MG/1
5 TABLET ORAL EVERY EVENING
Qty: 90 TABLET | Refills: 3 | Status: SHIPPED | OUTPATIENT
Start: 2022-06-22

## 2022-06-23 ENCOUNTER — HOSPITAL ENCOUNTER (OUTPATIENT)
Dept: NON INVASIVE DIAGNOSTICS | Facility: HOSPITAL | Age: 72
Discharge: HOME | End: 2022-06-23
Attending: INTERNAL MEDICINE
Payer: MEDICARE

## 2022-06-23 VITALS
BODY MASS INDEX: 30.62 KG/M2 | SYSTOLIC BLOOD PRESSURE: 130 MMHG | HEIGHT: 68 IN | HEART RATE: 77 BPM | DIASTOLIC BLOOD PRESSURE: 77 MMHG | WEIGHT: 202 LBS

## 2022-06-23 DIAGNOSIS — I27.20 PULMONARY HYPERTENSION (HCC): Chronic | ICD-10-CM

## 2022-06-23 LAB
AORTIC ROOT: 3.9 CM
AORTIC VALVE MEAN VELOCITY: 8.9 M/S
APICAL FOUR CHAMBER EJECTION FRACTION: 59 %
ASCENDING AORTA: 4.2 CM
AV LVOT MEAN GRADIENT: 4 MMHG
AV LVOT PEAK GRADIENT: 7 MMHG
AV MEAN GRADIENT: 4 MMHG
AV PEAK GRADIENT: 9 MMHG
AV REGURGITATION PRESSURE HALF TIME: 358 MS
AV VELOCITY RATIO: 0.9
DOP CALC AO PEAK VEL: 1.5 M/S
DOP CALC AO VTI: 26.86 CM
DOP CALC LVOT PEAK VEL VTI: 25.12 CM
DOP CALC LVOT PEAK VEL: 1.35 M/S
FRACTIONAL SHORTENING: 24 % (ref 28–44)
INTERVENTRICULAR SEPTUM IN DIASTOLE (PARASTERNAL SHORT AXIS VIEW): 1.6 CM
INTERVENTRICULAR SEPTUM: 1.6 CM (ref 0.6–1.1)
LAAS-AP4: 22.3 CM2
LEFT ATRIUM SIZE: 4.5 CM
LEFT INTERNAL DIMENSION IN SYSTOLE: 2.5 CM (ref 2.1–4)
LEFT VENTRICULAR INTERNAL DIMENSION IN DIASTOLE: 3.3 CM (ref 3.5–6)
LEFT VENTRICULAR POSTERIOR WALL IN END DIASTOLE: 1.2 CM
LEFT VENTRICULAR STROKE VOLUME: 22 ML
LVSV (TEICH): 22 ML
RIGHT ATRIAL 2D VOLUME: 87 ML
RIGHT ATRIUM AREA SYSTOLE A4C: 25.5 CM2
RIGHT VENTRICLE ID DIMENSION: 4.7 CM
SL CV AV DECELERATION TIME RETROGRADE: 1235 MS
SL CV AV PEAK GRADIENT RETROGRADE: 72 MMHG
SL CV PED ECHO LEFT VENTRICLE DIASTOLIC VOLUME (MOD BIPLANE) 2D: 44 ML
SL CV PED ECHO LEFT VENTRICLE SYSTOLIC VOLUME (MOD BIPLANE) 2D: 22 ML
TR MAX PG: 52 MMHG
TR PEAK VELOCITY: 3.6 M/S
TRICUSPID VALVE PEAK REGURGITATION VELOCITY: 3.62 M/S

## 2022-06-23 PROCEDURE — 93306 TTE W/DOPPLER COMPLETE: CPT | Performed by: INTERNAL MEDICINE

## 2022-06-23 PROCEDURE — 93306 TTE W/DOPPLER COMPLETE: CPT

## 2022-07-13 DIAGNOSIS — J30.1 ALLERGIC RHINITIS DUE TO POLLEN, UNSPECIFIED SEASONALITY: ICD-10-CM

## 2022-07-13 RX ORDER — MONTELUKAST SODIUM 10 MG/1
TABLET ORAL
Qty: 90 TABLET | Refills: 3 | Status: SHIPPED | OUTPATIENT
Start: 2022-07-13

## 2022-07-19 ENCOUNTER — APPOINTMENT (OUTPATIENT)
Dept: LAB | Facility: HOSPITAL | Age: 72
End: 2022-07-19
Payer: MEDICARE

## 2022-07-19 DIAGNOSIS — N32.2 FISTULA, BLADDER: ICD-10-CM

## 2022-07-19 DIAGNOSIS — Z01.818 PRE-OP TESTING: ICD-10-CM

## 2022-07-19 LAB
BASOPHILS # BLD MANUAL: 0 THOUSAND/UL (ref 0–0.1)
BASOPHILS NFR MAR MANUAL: 0 % (ref 0–1)
EOSINOPHIL # BLD MANUAL: 0 THOUSAND/UL (ref 0–0.4)
EOSINOPHIL NFR BLD MANUAL: 0 % (ref 0–6)
ERYTHROCYTE [DISTWIDTH] IN BLOOD BY AUTOMATED COUNT: 13.3 % (ref 11.6–15.1)
HCT VFR BLD AUTO: 37.1 % (ref 36.5–49.3)
HGB BLD-MCNC: 12.6 G/DL (ref 12–17)
LYMPHOCYTES # BLD AUTO: 15.65 THOUSAND/UL (ref 0.6–4.47)
LYMPHOCYTES # BLD AUTO: 80 % (ref 14–44)
MACROCYTES BLD QL AUTO: PRESENT
MCH RBC QN AUTO: 35.7 PG (ref 26.8–34.3)
MCHC RBC AUTO-ENTMCNC: 34 G/DL (ref 31.4–37.4)
MCV RBC AUTO: 105 FL (ref 82–98)
MONOCYTES # BLD AUTO: 0.59 THOUSAND/UL (ref 0–1.22)
MONOCYTES NFR BLD: 3 % (ref 4–12)
NEUTROPHILS # BLD MANUAL: 3.33 THOUSAND/UL (ref 1.85–7.62)
NEUTS SEG NFR BLD AUTO: 17 % (ref 43–75)
PLATELET # BLD AUTO: 174 THOUSANDS/UL (ref 149–390)
PLATELET BLD QL SMEAR: ADEQUATE
PMV BLD AUTO: 9.9 FL (ref 8.9–12.7)
RBC # BLD AUTO: 3.53 MILLION/UL (ref 3.88–5.62)
SMUDGE CELLS BLD QL SMEAR: PRESENT
WBC # BLD AUTO: 19.56 THOUSAND/UL (ref 4.31–10.16)

## 2022-07-19 PROCEDURE — 85007 BL SMEAR W/DIFF WBC COUNT: CPT

## 2022-07-19 PROCEDURE — 36415 COLL VENOUS BLD VENIPUNCTURE: CPT

## 2022-07-19 PROCEDURE — 85027 COMPLETE CBC AUTOMATED: CPT

## 2022-07-25 ENCOUNTER — APPOINTMENT (OUTPATIENT)
Dept: LAB | Facility: MEDICAL CENTER | Age: 72
End: 2022-07-25
Payer: MEDICARE

## 2022-07-25 ENCOUNTER — TELEPHONE (OUTPATIENT)
Dept: FAMILY MEDICINE CLINIC | Facility: CLINIC | Age: 72
End: 2022-07-25

## 2022-07-25 ENCOUNTER — TELEPHONE (OUTPATIENT)
Dept: PULMONOLOGY | Facility: CLINIC | Age: 72
End: 2022-07-25

## 2022-07-25 DIAGNOSIS — Z01.818 PRE-OP TESTING: ICD-10-CM

## 2022-07-25 DIAGNOSIS — N32.2 FISTULA, BLADDER: ICD-10-CM

## 2022-07-25 DIAGNOSIS — N32.2 VESICAL FISTULA, NOT ELSEWHERE CLASSIFIED: ICD-10-CM

## 2022-07-25 LAB
ANION GAP SERPL CALCULATED.3IONS-SCNC: 7 MMOL/L (ref 4–13)
BUN SERPL-MCNC: 5 MG/DL (ref 5–25)
CALCIUM SERPL-MCNC: 9.1 MG/DL (ref 8.3–10.1)
CHLORIDE SERPL-SCNC: 100 MMOL/L (ref 96–108)
CO2 SERPL-SCNC: 26 MMOL/L (ref 21–32)
CREAT SERPL-MCNC: 0.76 MG/DL (ref 0.6–1.3)
GFR SERPL CREATININE-BSD FRML MDRD: 93 ML/MIN/1.73SQ M
GLUCOSE P FAST SERPL-MCNC: 115 MG/DL (ref 65–99)
POTASSIUM SERPL-SCNC: 3.8 MMOL/L (ref 3.5–5.3)
SODIUM SERPL-SCNC: 133 MMOL/L (ref 135–147)

## 2022-07-25 PROCEDURE — 80048 BASIC METABOLIC PNL TOTAL CA: CPT

## 2022-07-25 PROCEDURE — 87086 URINE CULTURE/COLONY COUNT: CPT

## 2022-07-25 PROCEDURE — 87186 SC STD MICRODIL/AGAR DIL: CPT

## 2022-07-25 PROCEDURE — 87077 CULTURE AEROBIC IDENTIFY: CPT

## 2022-07-25 PROCEDURE — 36415 COLL VENOUS BLD VENIPUNCTURE: CPT

## 2022-07-25 NOTE — TELEPHONE ENCOUNTER
Pt called in stating he needs to have an EKG done before his surgery on 8/3/22  He stated that Richard Segovia is suppose to send a script to our office for him to get this done    He also stated he's feeling very nervous about the upcoming surgery and is requesting a phone call from you when you have a chance [phone]

## 2022-07-25 NOTE — TELEPHONE ENCOUNTER
Lvm for pt to schedule a 6m f/u post echo @ our [address] office with Dr Roxann Phelps or CHANTALE in October

## 2022-07-26 ENCOUNTER — CLINICAL SUPPORT (OUTPATIENT)
Dept: FAMILY MEDICINE CLINIC | Facility: CLINIC | Age: 72
End: 2022-07-26

## 2022-07-26 DIAGNOSIS — Z01.818 PRE-OPERATIVE CLEARANCE: Primary | ICD-10-CM

## 2022-07-26 NOTE — TELEPHONE ENCOUNTER
Pts EKG was done today , he expects a call from you with results  I did advised him you will be in tomorrow to see pts   He states he is sure you are able to give him a call at anytime   EKG will be in your folder

## 2022-07-27 LAB — BACTERIA UR CULT: ABNORMAL

## 2022-07-28 ENCOUNTER — TELEPHONE (OUTPATIENT)
Dept: FAMILY MEDICINE CLINIC | Facility: CLINIC | Age: 72
End: 2022-07-28

## 2022-07-28 NOTE — TELEPHONE ENCOUNTER
Patient is looking for ECG results from Nurse Visit 7/26/22 (ECG in [facility])  Patient reports it is necessary for Surgery on 8/3/22 at [facility]  Patient is aware that PCP is out of office, has been seeing patients when in office, and we will work to provide a report on ECG via phone as soon as we receive consent from PCP  Patient understanding at call

## 2022-07-28 NOTE — TELEPHONE ENCOUNTER
Pt called around 10:20 AM requesting the results of his EKG and wanted to know why Dr Edgardo Berger hasn't called him yet  He requested to be transferred to [facility] to speak with her  Transfer made  Pt called back @ 10:25 AM to let me know that [facility] did not answer, so he left a nice message  He then requested an appt for a rapid test that is needed before his surgery on Wed

## 2022-07-29 ENCOUNTER — TELEPHONE (OUTPATIENT)
Dept: FAMILY MEDICINE CLINIC | Facility: CLINIC | Age: 72
End: 2022-07-29

## 2022-07-29 NOTE — TELEPHONE ENCOUNTER
Patient aware of Dr Santino Solis note below  Faxed ECG to [facility] PAT (f) [phone]

## 2022-08-01 ENCOUNTER — CLINICAL SUPPORT (OUTPATIENT)
Dept: FAMILY MEDICINE CLINIC | Facility: CLINIC | Age: 72
End: 2022-08-01
Payer: MEDICARE

## 2022-08-01 ENCOUNTER — TELEPHONE (OUTPATIENT)
Dept: FAMILY MEDICINE CLINIC | Facility: CLINIC | Age: 72
End: 2022-08-01

## 2022-08-01 DIAGNOSIS — Z01.818 PRE-OP EVALUATION: Primary | ICD-10-CM

## 2022-08-01 LAB
SARS-COV-2 AG UPPER RESP QL IA: NEGATIVE
VALID CONTROL: NORMAL

## 2022-08-01 PROCEDURE — 87811 SARS-COV-2 COVID19 W/OPTIC: CPT

## 2022-08-01 NOTE — TELEPHONE ENCOUNTER
I called [facility] around 9am this morning to see if Natasha Santana needed a clearance done for his surgery   I was told they will call me back and let me know if he needs a clearance  His surgery is Wednesday   Still no call back to see if he needs a clearance   I called again at 420pm , was transferred to the surgery coordinator and was disconnected  I tried calling back and no answer  I will refax lab work and ekg results

## 2022-08-05 ENCOUNTER — PATIENT MESSAGE (OUTPATIENT)
Dept: FAMILY MEDICINE CLINIC | Facility: CLINIC | Age: 72
End: 2022-08-05

## 2022-08-08 ENCOUNTER — TELEPHONE (OUTPATIENT)
Dept: FAMILY MEDICINE CLINIC | Facility: CLINIC | Age: 72
End: 2022-08-08

## 2022-08-08 NOTE — TELEPHONE ENCOUNTER
Patient called said he sent Dr Castaneda a message through my chart and has not heard a word from him  He would like the dr to call him to discuss labs  Patient also looking for a refill on atavan 5mg  To be sent to [facility]

## 2022-08-09 DIAGNOSIS — F41.8 SITUATIONAL ANXIETY: ICD-10-CM

## 2022-08-09 NOTE — TELEPHONE ENCOUNTER
Patient called to follow up on the [employer] message from August 5  He would like to hear from you from after his surgery

## 2022-08-10 ENCOUNTER — TELEPHONE (OUTPATIENT)
Dept: FAMILY MEDICINE CLINIC | Facility: CLINIC | Age: 72
End: 2022-08-10

## 2022-08-10 NOTE — TELEPHONE ENCOUNTER
Called patient for dr Verenice Nieves gave patient the name and phone number of the dr  For medical marijuana

## 2022-08-10 NOTE — TELEPHONE ENCOUNTER
Patient called again said Dr Mukul Briscoe never returned his call's , now patient needs the name of the person you referred him to for medical marijuana, he said you roomed with him at [facility]

## 2022-08-11 RX ORDER — LORAZEPAM 0.5 MG/1
0.5 TABLET ORAL EVERY 8 HOURS PRN
Qty: 30 TABLET | Refills: 0 | OUTPATIENT
Start: 2022-08-11

## 2022-08-12 ENCOUNTER — TELEPHONE (OUTPATIENT)
Dept: FAMILY MEDICINE CLINIC | Facility: CLINIC | Age: 72
End: 2022-08-12

## 2022-08-12 ENCOUNTER — TELEMEDICINE (OUTPATIENT)
Dept: FAMILY MEDICINE CLINIC | Facility: CLINIC | Age: 72
End: 2022-08-12
Payer: MEDICARE

## 2022-08-12 DIAGNOSIS — C91.10 CLL (CHRONIC LYMPHOCYTIC LEUKEMIA) (HCC): Chronic | ICD-10-CM

## 2022-08-12 DIAGNOSIS — N32.3 DIVERTICULUM OF BLADDER: ICD-10-CM

## 2022-08-12 DIAGNOSIS — R74.01 TRANSAMINITIS: Primary | ICD-10-CM

## 2022-08-12 DIAGNOSIS — D59.10 AUTOIMMUNE HEMOLYTIC ANEMIA (HCC): ICD-10-CM

## 2022-08-12 DIAGNOSIS — E87.1 HYPONATREMIA: ICD-10-CM

## 2022-08-12 DIAGNOSIS — I10 ESSENTIAL HYPERTENSION: Chronic | ICD-10-CM

## 2022-08-12 DIAGNOSIS — D50.9 IRON DEFICIENCY ANEMIA, UNSPECIFIED IRON DEFICIENCY ANEMIA TYPE: Chronic | ICD-10-CM

## 2022-08-12 DIAGNOSIS — F41.8 SITUATIONAL ANXIETY: ICD-10-CM

## 2022-08-12 PROCEDURE — 99214 OFFICE O/P EST MOD 30 MIN: CPT | Performed by: FAMILY MEDICINE

## 2022-08-12 RX ORDER — LORAZEPAM 0.5 MG/1
0.5 TABLET ORAL EVERY 8 HOURS PRN
Qty: 30 TABLET | Refills: 0 | Status: SHIPPED | OUTPATIENT
Start: 2022-08-12

## 2022-08-12 NOTE — PROGRESS NOTES
Virtual Regular Visit    Verification of patient location:    Patient is located in the following state in which I hold an active license [place]      Assessment/Plan:    Problem List Items Addressed This Visit        Cardiovascular and Mediastinum    Essential hypertension (Chronic)    Relevant Orders    CBC and differential    Comprehensive metabolic panel       Genitourinary    Diverticulum of bladder     Continue follow-up with his urologist at [place]  He still has his Hillman in place  Repeat CBC as he did have some postoperative anemia              Other    CLL (chronic lymphocytic leukemia) (HCC) (Chronic)    Relevant Orders    CBC and differential    Comprehensive metabolic panel    Iron deficiency anemia (Chronic)    Relevant Orders    CBC and differential    Hyponatremia    Relevant Orders    Comprehensive metabolic panel    Transaminitis - Primary    Relevant Orders    Comprehensive metabolic panel    Autoimmune hemolytic anemia ([facility] [address] )    Relevant Orders    CBC and differential    Comprehensive metabolic panel    Situational anxiety    Relevant Medications    LORazepam (Ativan) 0 5 mg tablet               Reason for visit is   Chief Complaint   Patient presents with    Virtual Regular Visit        Encounter provider Ritika Tamayo MD    Provider located at [address] [id number] [phone]      Recent Visits  Date Type Provider Dept   08/10/22 Telephone [address] Primary Care   08/08/22 Telephone [address] Primary Care   Showing recent visits within past 7 days and meeting all other requirements  Today's Visits  Date Type Provider Dept   08/12/22 Telephone Ritika Tamayo MD [place] [address] Primary Care   08/12/22 Telemedicine Ritika Tamayo MD [facility]   Showing today's visits and meeting all other requirements  Future Appointments  No visits were found meeting these conditions  Showing future appointments within next 150 days and meeting all other requirements       The patient was identified by name and date of birth  Swati Bruce was informed that this is a telemedicine visit and that the visit is being conducted through Telephone  My office door was closed  No one else was in the room  He acknowledged consent and understanding of privacy and security of the video platform  The patient has agreed to participate and understands they can discontinue the visit at any time  Patient is aware this is a billable service  Subjective  Swati Bruce is a 73 y o  male  HPI the patient was evaluated today over the telephone as he was unable to connect via televideo  He is doing pretty well postoperatively from his bladder diverticulum surgery  He still has a catheter in place  He is noting his urine is now clear  He did have some significant postoperative anemia and does have a history of CLL with hemolytic anemia  He admits to some fatigue but feels this is a normal postoperative fatigue  He denies any fever, chills      Past Medical History:   Diagnosis Date    Anxiety     BPH (benign prostatic hypertrophy)     Cancer (HCC)     CLL    [facility]     [facility]    Colon polyp     Concussion     Resolved: 08/22/16    Depression     Diverticulitis 1/13/2020 2014 CT done 11/19 12/19 CT done     E coli bacteremia 6/27/2021 2/2 blood cultures [id number] appears to be the urine     Epididymitis 5/19/2021 5/2021    Gastrointestinal hemorrhage     Last assessed: 08/27/13    GERD (gastroesophageal reflux disease)     Hearing loss of aging     Hiatal hernia     History of transfusion     Hyperlipidemia     Hypertension     Iron deficiency anemia     Microscopic hematuria     Last assessed: 06/28/13    OA (osteoarthritis)     right hip    Pneumothorax     Primary osteoarthritis of right hip 9/29/2017    He is status post right hip arthroplasty    Prostatitis     Pulmonary hypertension (HCC)     Seasonal allergies     Urinary tract infection associated with catheterization of urinary tract ([facility] [address] ) 2/5/2021    Pseudomonal UTI asymptomatic  Per Urology do not treat at this time   Urinary tract infection associated with catheterization of urinary tract ([facility] [address] ) 2/5/2021    Pseudomonal UTI asymptomatic  Per Urology do not treat at this time    He did have urosepsis from E coli 7/21    UTI (urinary tract infection)     in past    Wears glasses        Past Surgical History:   Procedure Laterality Date    COLONOSCOPY      CYSTOSCOPY  10/09/2014    Diagnostic    CYSTOSCOPY  06/29/2020    FRACTURE SURGERY      left lower arm    FRACTURE SURGERY      left femur    HERNIA REPAIR      JOINT REPLACEMENT Right     hip    OTHER SURGICAL HISTORY  03/2011    Spinal anesthesia epidural    LA TOTAL HIP ARTHROPLASTY Right 9/29/2017    Procedure: ARTHROPLASTY HIP TOTAL ANTERIOR;  Surgeon: Radha Jay MD;  Location: [facility];  Service: Orthopedics    TONSILLECTOMY AND ADENOIDECTOMY      TRANSURETHRAL RESECTION OF PROSTATE      x 2    WRIST SURGERY         Current Outpatient Medications   Medication Sig Dispense Refill    LORazepam (Ativan) 0 5 mg tablet Take 1 tablet (0 5 mg total) by mouth every 8 (eight) hours as needed for anxiety 30 tablet 0    amLODIPine (NORVASC) 5 mg tablet Take 1 tablet (5 mg total) by mouth every evening 90 tablet 3    aspirin 81 MG tablet Take 1 tablet by mouth daily      atorvastatin (LIPITOR) 20 mg tablet TAKE ONE TABLET BY MOUTH ONCE EVERY DAY 90 tablet 3    Blood Pressure Monitoring (Omron 5 Series BP Monitor) PEE       cetirizine (ZyrTEC) 10 mg tablet Take 10 mg by mouth as needed        folic acid (FOLVITE) 609 mcg tablet Take 1 tablet (400 mcg total) by mouth daily 30 tablet 0    metoprolol tartrate (LOPRESSOR) 25 mg tablet TAKE ONE TABLET BY MOUTH EVERY TWELVE HOURS 180 tablet 3    montelukast (SINGULAIR) 10 mg tablet TAKE ONE TABLET BY MOUTH DAILY 90 tablet 3    Omega-3 Fatty Acids (FISH OIL) 1,000 mg Take by mouth daily      omeprazole (PriLOSEC) 40 MG capsule Take one capsule by mouth once every day  90 capsule 3    sertraline (ZOLOFT) 50 mg tablet TAKE ONE TABLET BY MOUTH EVERY DAY 90 tablet 3    vitamin B-12 (CYANOCOBALAMIN) 500 MCG TABS Take 1 tablet (500 mcg total) by mouth daily 30 tablet 0     No current facility-administered medications for this visit  Allergies   Allergen Reactions    Codeine Other (See Comments)     agitated    Sulfamethoxazole-Trimethoprim GI Intolerance and Abdominal Pain     upset stomach       Review of Systems   Constitutional: Positive for fatigue  Negative for appetite change, chills, fever and unexpected weight change  HENT: Negative for trouble swallowing  Respiratory: Negative for chest tightness, shortness of breath and wheezing  Cardiovascular: Negative for chest pain, palpitations and leg swelling  Gastrointestinal: Negative for abdominal pain, constipation and diarrhea  Endocrine: Negative for polyuria  Genitourinary: Negative for difficulty urinating, flank pain, hematuria, penile swelling, scrotal swelling and testicular pain  Currently has a Hillman in place   Musculoskeletal: Negative for arthralgias and myalgias  Skin: Negative for rash  Psychiatric/Behavioral: Negative for sleep disturbance  Video Exam    There were no vitals filed for this visit      Physical Exam     I spent 20 minutes directly with the patient during this visit

## 2022-08-12 NOTE — ASSESSMENT & PLAN NOTE
Continue follow-up with his urologist at [facility]  He still has his Hillman in place  Repeat CBC as he did have some postoperative anemia

## 2022-08-12 NOTE — TELEPHONE ENCOUNTER
Pt called asking who the black girl is, not the nice one but the one who takes you back? I asked him if he was speaking about Nickie, he said yes  He asked if she passed the message on to  Dr Aguiar Delbarton  I told him Dr Aguiar Delbarton did receive his message about returning his phone call  He stated Leonel was to call him at 12:15 PM and now it is 12:30 PM  What is going on that he's not calling me? I told him Dr Aguiar Delbarton may be running behind with pt's  He said,"That doesn't surprise me!"  He said," Look Ashley Robertson, I'm sorry to bother you but I just want Arabellaorrjose to call me  I know I'm a pain in the ass  Then he stated if Leonel doesn't call me by 1:00 PM I'm calling back

## 2022-08-15 ENCOUNTER — APPOINTMENT (OUTPATIENT)
Dept: LAB | Facility: HOSPITAL | Age: 72
End: 2022-08-15
Payer: MEDICARE

## 2022-08-15 ENCOUNTER — TELEPHONE (OUTPATIENT)
Dept: HEMATOLOGY ONCOLOGY | Facility: CLINIC | Age: 72
End: 2022-08-15

## 2022-08-15 ENCOUNTER — HOSPITAL ENCOUNTER (OUTPATIENT)
Dept: RADIOLOGY | Facility: HOSPITAL | Age: 72
Discharge: HOME | End: 2022-08-15
Payer: MEDICARE

## 2022-08-15 DIAGNOSIS — D59.10 AUTOIMMUNE HEMOLYTIC ANEMIA (HCC): ICD-10-CM

## 2022-08-15 DIAGNOSIS — C91.10 CLL (CHRONIC LYMPHOCYTIC LEUKEMIA) (HCC): Primary | ICD-10-CM

## 2022-08-15 DIAGNOSIS — N32.3 DIVERTICULUM OF BLADDER: ICD-10-CM

## 2022-08-15 DIAGNOSIS — I10 ESSENTIAL HYPERTENSION: ICD-10-CM

## 2022-08-15 DIAGNOSIS — E87.1 HYPONATREMIA: ICD-10-CM

## 2022-08-15 DIAGNOSIS — R74.01 TRANSAMINITIS: ICD-10-CM

## 2022-08-15 DIAGNOSIS — C91.10 CLL (CHRONIC LYMPHOCYTIC LEUKEMIA) (HCC): ICD-10-CM

## 2022-08-15 DIAGNOSIS — D50.9 IRON DEFICIENCY ANEMIA, UNSPECIFIED IRON DEFICIENCY ANEMIA TYPE: ICD-10-CM

## 2022-08-15 LAB
ALBUMIN SERPL BCP-MCNC: 3.8 G/DL (ref 3.5–5)
ALP SERPL-CCNC: 129 U/L (ref 46–116)
ALT SERPL W P-5'-P-CCNC: 41 U/L (ref 12–78)
ANION GAP SERPL CALCULATED.3IONS-SCNC: 7 MMOL/L (ref 4–13)
AST SERPL W P-5'-P-CCNC: 30 U/L (ref 5–45)
BASOPHILS # BLD MANUAL: 0 THOUSAND/UL (ref 0–0.1)
BASOPHILS NFR MAR MANUAL: 0 % (ref 0–1)
BILIRUB SERPL-MCNC: 1 MG/DL (ref 0.2–1)
BUN SERPL-MCNC: 8 MG/DL (ref 5–25)
CALCIUM SERPL-MCNC: 9 MG/DL (ref 8.3–10.1)
CHLORIDE SERPL-SCNC: 96 MMOL/L (ref 96–108)
CO2 SERPL-SCNC: 29 MMOL/L (ref 21–32)
CREAT SERPL-MCNC: 0.88 MG/DL (ref 0.6–1.3)
EOSINOPHIL # BLD MANUAL: 0.45 THOUSAND/UL (ref 0–0.4)
EOSINOPHIL NFR BLD MANUAL: 1 % (ref 0–6)
ERYTHROCYTE [DISTWIDTH] IN BLOOD BY AUTOMATED COUNT: 14.4 % (ref 11.6–15.1)
GFR SERPL CREATININE-BSD FRML MDRD: 87 ML/MIN/1.73SQ M
GLUCOSE SERPL-MCNC: 112 MG/DL (ref 65–140)
HCT VFR BLD AUTO: 31.3 % (ref 36.5–49.3)
HGB BLD-MCNC: 9.8 G/DL (ref 12–17)
LYMPHOCYTES # BLD AUTO: 34.49 THOUSAND/UL (ref 0.6–4.47)
LYMPHOCYTES # BLD AUTO: 77 % (ref 14–44)
MACROCYTES BLD QL AUTO: PRESENT
MCH RBC QN AUTO: 33.9 PG (ref 26.8–34.3)
MCHC RBC AUTO-ENTMCNC: 31.3 G/DL (ref 31.4–37.4)
MCV RBC AUTO: 108 FL (ref 82–98)
MONOCYTES # BLD AUTO: 1.34 THOUSAND/UL (ref 0–1.22)
MONOCYTES NFR BLD: 3 % (ref 4–12)
NEUTROPHILS # BLD MANUAL: 8.51 THOUSAND/UL (ref 1.85–7.62)
NEUTS BAND NFR BLD MANUAL: 2 % (ref 0–8)
NEUTS SEG NFR BLD AUTO: 17 % (ref 43–75)
PLATELET # BLD AUTO: 347 THOUSANDS/UL (ref 149–390)
PLATELET BLD QL SMEAR: ADEQUATE
PMV BLD AUTO: 9.7 FL (ref 8.9–12.7)
POTASSIUM SERPL-SCNC: 4.4 MMOL/L (ref 3.5–5.3)
PROT SERPL-MCNC: 8.4 G/DL (ref 6.4–8.4)
RBC # BLD AUTO: 2.89 MILLION/UL (ref 3.88–5.62)
SODIUM SERPL-SCNC: 132 MMOL/L (ref 135–147)
WBC # BLD AUTO: 44.79 THOUSAND/UL (ref 4.31–10.16)

## 2022-08-15 PROCEDURE — 74430 CONTRAST X-RAY BLADDER: CPT

## 2022-08-15 PROCEDURE — 80053 COMPREHEN METABOLIC PANEL: CPT

## 2022-08-15 PROCEDURE — 36415 COLL VENOUS BLD VENIPUNCTURE: CPT

## 2022-08-15 PROCEDURE — 85007 BL SMEAR W/DIFF WBC COUNT: CPT

## 2022-08-15 PROCEDURE — 85027 COMPLETE CBC AUTOMATED: CPT

## 2022-08-15 RX ADMIN — IOTHALAMATE MEGLUMINE 150 ML: 172 INJECTION URETERAL at 13:30

## 2022-08-15 NOTE — TELEPHONE ENCOUNTER
Left VM for patient to make him aware that Dr Arlene Dailey ordered more labs for him to have completed  He did review the lab results that his PCP ordered

## 2022-08-15 NOTE — TELEPHONE ENCOUNTER
CALL RETURN FORM   Reason for patient call? Patient calling in to inform Dr Sheree Montez that due to his recent surgery his hemoglobin is at  9 5 and his white blood cell count has elevated  Patient would like the provider to look over his results from 8/3 to 8/12  and call back to discuss  Patient's primary oncologist?  Dr Sheree Montez   Name of person the patient was calling for? Donna     Any additional information to add, if applicable? Patient's best call back number is [phone]   Informed patient that the message will be forwarded to the team and someone will get back to them as soon as possible    Did you relay this information to the patient?  Yes

## 2022-08-16 ENCOUNTER — TELEPHONE (OUTPATIENT)
Dept: HEMATOLOGY ONCOLOGY | Facility: CLINIC | Age: 72
End: 2022-08-16

## 2022-08-16 ENCOUNTER — APPOINTMENT (OUTPATIENT)
Dept: LAB | Age: 72
End: 2022-08-16
Payer: MEDICARE

## 2022-08-16 DIAGNOSIS — C91.10 CLL (CHRONIC LYMPHOCYTIC LEUKEMIA) (HCC): ICD-10-CM

## 2022-08-16 DIAGNOSIS — D59.10 AUTOIMMUNE HEMOLYTIC ANEMIA (HCC): ICD-10-CM

## 2022-08-16 LAB
DAT POLY-SP REAG RBC QL: NEGATIVE
LDH SERPL-CCNC: 332 U/L (ref 81–234)
RETICS # AUTO: ABNORMAL 10*3/UL (ref 14356–105094)
RETICS # CALC: 5.32 % (ref 0.37–1.87)

## 2022-08-16 PROCEDURE — 85045 AUTOMATED RETICULOCYTE COUNT: CPT

## 2022-08-16 PROCEDURE — 83010 ASSAY OF HAPTOGLOBIN QUANT: CPT

## 2022-08-16 PROCEDURE — 36415 COLL VENOUS BLD VENIPUNCTURE: CPT

## 2022-08-16 PROCEDURE — 86880 COOMBS TEST DIRECT: CPT

## 2022-08-16 PROCEDURE — 83615 LACTATE (LD) (LDH) ENZYME: CPT

## 2022-08-16 NOTE — TELEPHONE ENCOUNTER
I spoke with Abigail Mallory regarding his recent CBC which showed hemoglobin 9 7  He told me that he recently had colon resection at [facility] for diverticulosis 2 weeks ago  This could be the surgery related anemia  He already did hemolysis workup which are pending  Once I obtain the pending laboratory, I will contact him  He is in agreement

## 2022-08-16 NOTE — TELEPHONE ENCOUNTER
Per Dr Nirmala Huff: What is his questions? He has anemia again for which he may need to start treatment  I needs more blood work to find out if this hemolytic anemia or CLL progression  Left VM for patient to make him aware of the above

## 2022-08-16 NOTE — TELEPHONE ENCOUNTER
Patient called and left following   He would like for you to call him back  "Hi, this is Tayla Loyd calling  Date of birth [birth year]  I'm a patient of Doctor Miguelina Alvarez and I would really love to talk to him  I see has me getting a whole bunch of tests today  I'd called yesterday later, like around 3:45, and he got back on my chart that fast  I guess he wants me to get some blood work, but I have some questions please  And please get back to me at [phone]   Thank you "

## 2022-08-16 NOTE — TELEPHONE ENCOUNTER
CALL RETURN FORM   Reason for patient call? Patient only stated he wanted a call from Dr Easton De Oliveira    Patient's primary oncologist? Dr Easton De Oliveira   Name of person the patient was calling for? Dr Easton De Oliveira   Any additional information to add, if applicable? Informed patient that the message will be forwarded to the team and someone will get back to them as soon as possible    Did you relay this information to the patient?  Yes

## 2022-08-17 ENCOUNTER — TELEPHONE (OUTPATIENT)
Dept: HEMATOLOGY ONCOLOGY | Facility: CLINIC | Age: 72
End: 2022-08-17

## 2022-08-17 DIAGNOSIS — C91.10 CLL (CHRONIC LYMPHOCYTIC LEUKEMIA) (HCC): Primary | ICD-10-CM

## 2022-08-17 DIAGNOSIS — D59.10 AUTOIMMUNE HEMOLYTIC ANEMIA (HCC): ICD-10-CM

## 2022-08-17 LAB — HAPTOGLOB SERPL-MCNC: <10 MG/DL (ref 32–363)

## 2022-08-17 NOTE — TELEPHONE ENCOUNTER
Patient called back and said that he had a major operation in [facility], which you weren't aware of  He was given keflex for that  He is taking tylenol for pain, but won't be taking that much longer since he foes feel better  He is also just about done taking keflex as well  He is getting his catheter out tomorrow in [facility]  He read that the procedure can mess with the hemolytic anemia/lab values  He wanted me to make you aware of the above

## 2022-08-17 NOTE — TELEPHONE ENCOUNTER
----- Message from Guevara Wilson MD sent at 8/17/2022 10:49 AM EDT -----  Let him know that I reviewed his labs  No immediate actions needed  Let him do CBC in 4 weeks to monitor anemia closely

## 2022-08-30 ENCOUNTER — TELEPHONE (OUTPATIENT)
Dept: HEMATOLOGY ONCOLOGY | Facility: CLINIC | Age: 72
End: 2022-08-30

## 2022-08-30 NOTE — TELEPHONE ENCOUNTER
Left VM for patient to make him aware to get labs on/around 9/17/22  He can get these drawn anytime the week of 9/11

## 2022-09-04 DIAGNOSIS — E78.00 PURE HYPERCHOLESTEROLEMIA, UNSPECIFIED: ICD-10-CM

## 2022-09-04 RX ORDER — ATORVASTATIN CALCIUM 20 MG/1
TABLET, FILM COATED ORAL
Qty: 90 TABLET | Refills: 3 | Status: SHIPPED | OUTPATIENT
Start: 2022-09-04

## 2022-09-08 ENCOUNTER — TELEPHONE (OUTPATIENT)
Dept: HEMATOLOGY ONCOLOGY | Facility: CLINIC | Age: 72
End: 2022-09-08

## 2022-09-08 NOTE — TELEPHONE ENCOUNTER
Left voicemail for patient about rescheduled appointment  Provided details of new appointment  Left our callback number in case this does not work for the patient

## 2022-09-14 ENCOUNTER — APPOINTMENT (OUTPATIENT)
Dept: LAB | Facility: MEDICAL CENTER | Age: 72
End: 2022-09-14
Payer: MEDICARE

## 2022-09-14 DIAGNOSIS — C91.10 CLL (CHRONIC LYMPHOCYTIC LEUKEMIA) (HCC): ICD-10-CM

## 2022-09-14 DIAGNOSIS — D59.10 AUTOIMMUNE HEMOLYTIC ANEMIA (HCC): ICD-10-CM

## 2022-09-14 LAB
BASOPHILS # BLD AUTO: 0.04 THOUSANDS/ΜL (ref 0–0.1)
BASOPHILS NFR BLD AUTO: 0 % (ref 0–1)
EOSINOPHIL # BLD AUTO: 0.09 THOUSAND/ΜL (ref 0–0.61)
EOSINOPHIL NFR BLD AUTO: 1 % (ref 0–6)
ERYTHROCYTE [DISTWIDTH] IN BLOOD BY AUTOMATED COUNT: 16.4 % (ref 11.6–15.1)
HCT VFR BLD AUTO: 36.4 % (ref 36.5–49.3)
HGB BLD-MCNC: 11.4 G/DL (ref 12–17)
IMM GRANULOCYTES # BLD AUTO: 0.04 THOUSAND/UL (ref 0–0.2)
IMM GRANULOCYTES NFR BLD AUTO: 0 % (ref 0–2)
LYMPHOCYTES # BLD AUTO: 15.65 THOUSANDS/ΜL (ref 0.6–4.47)
LYMPHOCYTES NFR BLD AUTO: 83 % (ref 14–44)
MCH RBC QN AUTO: 33.9 PG (ref 26.8–34.3)
MCHC RBC AUTO-ENTMCNC: 31.3 G/DL (ref 31.4–37.4)
MCV RBC AUTO: 108 FL (ref 82–98)
MONOCYTES # BLD AUTO: 0.21 THOUSAND/ΜL (ref 0.17–1.22)
MONOCYTES NFR BLD AUTO: 1 % (ref 4–12)
NEUTROPHILS # BLD AUTO: 2.85 THOUSANDS/ΜL (ref 1.85–7.62)
NEUTS SEG NFR BLD AUTO: 15 % (ref 43–75)
NRBC BLD AUTO-RTO: 0 /100 WBCS
PLATELET # BLD AUTO: 194 THOUSANDS/UL (ref 149–390)
PMV BLD AUTO: 10.2 FL (ref 8.9–12.7)
RBC # BLD AUTO: 3.36 MILLION/UL (ref 3.88–5.62)
WBC # BLD AUTO: 18.88 THOUSAND/UL (ref 4.31–10.16)

## 2022-09-14 PROCEDURE — 36415 COLL VENOUS BLD VENIPUNCTURE: CPT

## 2022-09-14 PROCEDURE — 85025 COMPLETE CBC W/AUTO DIFF WBC: CPT

## 2022-09-15 ENCOUNTER — APPOINTMENT (OUTPATIENT)
Dept: LAB | Facility: MEDICAL CENTER | Age: 72
End: 2022-09-15
Payer: MEDICARE

## 2022-09-15 DIAGNOSIS — N42.9 PROSTATE DISEASE: ICD-10-CM

## 2022-09-15 PROCEDURE — 87086 URINE CULTURE/COLONY COUNT: CPT

## 2022-09-15 PROCEDURE — 87186 SC STD MICRODIL/AGAR DIL: CPT

## 2022-09-15 PROCEDURE — 87077 CULTURE AEROBIC IDENTIFY: CPT

## 2022-09-17 LAB — BACTERIA UR CULT: ABNORMAL

## 2022-09-27 ENCOUNTER — APPOINTMENT (OUTPATIENT)
Dept: RADIOLOGY | Facility: HOSPITAL | Age: 72
End: 2022-09-27
Payer: MEDICARE

## 2022-09-27 ENCOUNTER — APPOINTMENT (EMERGENCY)
Dept: CT IMAGING | Facility: HOSPITAL | Age: 72
End: 2022-09-27
Payer: MEDICARE

## 2022-09-27 ENCOUNTER — HOSPITAL ENCOUNTER (OUTPATIENT)
Facility: HOSPITAL | Age: 72
Setting detail: OBSERVATION
Discharge: HOME | End: 2022-09-28
Attending: EMERGENCY MEDICINE | Admitting: INTERNAL MEDICINE
Payer: MEDICARE

## 2022-09-27 DIAGNOSIS — N30.01 ACUTE CYSTITIS WITH HEMATURIA: ICD-10-CM

## 2022-09-27 DIAGNOSIS — D50.9 IRON DEFICIENCY ANEMIA, UNSPECIFIED IRON DEFICIENCY ANEMIA TYPE: Chronic | ICD-10-CM

## 2022-09-27 DIAGNOSIS — N39.0 UTI (URINARY TRACT INFECTION): ICD-10-CM

## 2022-09-27 DIAGNOSIS — Z78.9 ALCOHOL USE: ICD-10-CM

## 2022-09-27 DIAGNOSIS — R56.9 SEIZURE-LIKE ACTIVITY (HCC): Primary | ICD-10-CM

## 2022-09-27 DIAGNOSIS — I10 ESSENTIAL HYPERTENSION: Chronic | ICD-10-CM

## 2022-09-27 DIAGNOSIS — I27.20 PULMONARY HYPERTENSION (HCC): Chronic | ICD-10-CM

## 2022-09-27 PROBLEM — F10.90 ALCOHOL USE: Status: ACTIVE | Noted: 2022-09-27

## 2022-09-27 LAB
2HR DELTA HS TROPONIN: 11 NG/L
4HR DELTA HS TROPONIN: 9 NG/L
ANION GAP SERPL CALCULATED.3IONS-SCNC: 11 MMOL/L (ref 4–13)
ANISOCYTOSIS BLD QL SMEAR: PRESENT
ATRIAL RATE: 116 BPM
BACTERIA UR QL AUTO: ABNORMAL /HPF
BASOPHILS # BLD MANUAL: 0 THOUSAND/UL (ref 0–0.1)
BASOPHILS NFR MAR MANUAL: 0 % (ref 0–1)
BILIRUB UR QL STRIP: NEGATIVE
BUN SERPL-MCNC: 10 MG/DL (ref 5–25)
CALCIUM SERPL-MCNC: 8.9 MG/DL (ref 8.3–10.1)
CARDIAC TROPONIN I PNL SERPL HS: 14 NG/L
CARDIAC TROPONIN I PNL SERPL HS: 23 NG/L
CARDIAC TROPONIN I PNL SERPL HS: 25 NG/L
CHLORIDE SERPL-SCNC: 95 MMOL/L (ref 96–108)
CLARITY UR: ABNORMAL
CO2 SERPL-SCNC: 26 MMOL/L (ref 21–32)
COLOR UR: ABNORMAL
CREAT SERPL-MCNC: 0.92 MG/DL (ref 0.6–1.3)
EOSINOPHIL # BLD MANUAL: 0 THOUSAND/UL (ref 0–0.4)
EOSINOPHIL NFR BLD MANUAL: 0 % (ref 0–6)
ERYTHROCYTE [DISTWIDTH] IN BLOOD BY AUTOMATED COUNT: 15.4 % (ref 11.6–15.1)
GFR SERPL CREATININE-BSD FRML MDRD: 83 ML/MIN/1.73SQ M
GLUCOSE SERPL-MCNC: 135 MG/DL (ref 65–140)
GLUCOSE UR STRIP-MCNC: NEGATIVE MG/DL
HCT VFR BLD AUTO: 34.8 % (ref 36.5–49.3)
HGB BLD-MCNC: 11.6 G/DL (ref 12–17)
HGB UR QL STRIP.AUTO: ABNORMAL
KETONES UR STRIP-MCNC: NEGATIVE MG/DL
LACTATE SERPL-SCNC: 1.3 MMOL/L (ref 0.5–2)
LEUKOCYTE ESTERASE UR QL STRIP: ABNORMAL
LYMPHOCYTES # BLD AUTO: 14.61 THOUSAND/UL (ref 0.6–4.47)
LYMPHOCYTES # BLD AUTO: 78 % (ref 14–44)
MACROCYTES BLD QL AUTO: PRESENT
MCH RBC QN AUTO: 34.5 PG (ref 26.8–34.3)
MCHC RBC AUTO-ENTMCNC: 33.3 G/DL (ref 31.4–37.4)
MCV RBC AUTO: 104 FL (ref 82–98)
MONOCYTES # BLD AUTO: 0.19 THOUSAND/UL (ref 0–1.22)
MONOCYTES NFR BLD: 1 % (ref 4–12)
NEUTROPHILS # BLD MANUAL: 3.93 THOUSAND/UL (ref 1.85–7.62)
NEUTS BAND NFR BLD MANUAL: 5 % (ref 0–8)
NEUTS SEG NFR BLD AUTO: 16 % (ref 43–75)
NITRITE UR QL STRIP: NEGATIVE
NON-SQ EPI CELLS URNS QL MICRO: ABNORMAL /HPF
OTHER STN SPEC: ABNORMAL
P AXIS: 34 DEGREES
PH UR STRIP.AUTO: 6 [PH]
PLATELET # BLD AUTO: 169 THOUSANDS/UL (ref 149–390)
PLATELET BLD QL SMEAR: ADEQUATE
PMV BLD AUTO: 9.7 FL (ref 8.9–12.7)
POTASSIUM SERPL-SCNC: 3.7 MMOL/L (ref 3.5–5.3)
PR INTERVAL: 248 MS
PROT UR STRIP-MCNC: >=300 MG/DL
QRS AXIS: 53 DEGREES
QRSD INTERVAL: 80 MS
QT INTERVAL: 292 MS
QTC INTERVAL: 405 MS
RBC # BLD AUTO: 3.36 MILLION/UL (ref 3.88–5.62)
RBC #/AREA URNS AUTO: ABNORMAL /HPF
SARS-COV-2 AG UPPER RESP QL IA: NORMAL
SMUDGE CELLS BLD QL SMEAR: PRESENT
SODIUM SERPL-SCNC: 132 MMOL/L (ref 135–147)
SP GR UR STRIP.AUTO: >=1.03 (ref 1–1.03)
T WAVE AXIS: 46 DEGREES
UROBILINOGEN UR QL STRIP.AUTO: 1 E.U./DL
VENTRICULAR RATE: 116 BPM
WBC # BLD AUTO: 18.73 THOUSAND/UL (ref 4.31–10.16)
WBC #/AREA URNS AUTO: ABNORMAL /HPF

## 2022-09-27 PROCEDURE — 83605 ASSAY OF LACTIC ACID: CPT | Performed by: EMERGENCY MEDICINE

## 2022-09-27 PROCEDURE — 87086 URINE CULTURE/COLONY COUNT: CPT | Performed by: EMERGENCY MEDICINE

## 2022-09-27 PROCEDURE — 93010 ELECTROCARDIOGRAM REPORT: CPT | Performed by: STUDENT IN AN ORGANIZED HEALTH CARE EDUCATION/TRAINING PROGRAM

## 2022-09-27 PROCEDURE — 85025 COMPLETE CBC W/AUTO DIFF WBC: CPT | Performed by: EMERGENCY MEDICINE

## 2022-09-27 PROCEDURE — G1004 CDSM NDSC: HCPCS

## 2022-09-27 PROCEDURE — 99220 PR INITIAL OBSERVATION CARE/DAY 70 MINUTES: CPT | Performed by: INTERNAL MEDICINE

## 2022-09-27 PROCEDURE — 36415 COLL VENOUS BLD VENIPUNCTURE: CPT | Performed by: EMERGENCY MEDICINE

## 2022-09-27 PROCEDURE — 84484 ASSAY OF TROPONIN QUANT: CPT | Performed by: EMERGENCY MEDICINE

## 2022-09-27 PROCEDURE — 99285 EMERGENCY DEPT VISIT HI MDM: CPT | Performed by: EMERGENCY MEDICINE

## 2022-09-27 PROCEDURE — 96374 THER/PROPH/DIAG INJ IV PUSH: CPT

## 2022-09-27 PROCEDURE — 84145 PROCALCITONIN (PCT): CPT | Performed by: INTERNAL MEDICINE

## 2022-09-27 PROCEDURE — 93005 ELECTROCARDIOGRAM TRACING: CPT

## 2022-09-27 PROCEDURE — 87636 SARSCOV2 & INF A&B AMP PRB: CPT | Performed by: INTERNAL MEDICINE

## 2022-09-27 PROCEDURE — 80048 BASIC METABOLIC PNL TOTAL CA: CPT | Performed by: EMERGENCY MEDICINE

## 2022-09-27 PROCEDURE — 70450 CT HEAD/BRAIN W/O DYE: CPT

## 2022-09-27 PROCEDURE — 81001 URINALYSIS AUTO W/SCOPE: CPT | Performed by: EMERGENCY MEDICINE

## 2022-09-27 PROCEDURE — 87040 BLOOD CULTURE FOR BACTERIA: CPT | Performed by: EMERGENCY MEDICINE

## 2022-09-27 PROCEDURE — 87077 CULTURE AEROBIC IDENTIFY: CPT | Performed by: EMERGENCY MEDICINE

## 2022-09-27 PROCEDURE — 96361 HYDRATE IV INFUSION ADD-ON: CPT

## 2022-09-27 PROCEDURE — 85007 BL SMEAR W/DIFF WBC COUNT: CPT | Performed by: EMERGENCY MEDICINE

## 2022-09-27 PROCEDURE — 87811 SARS-COV-2 COVID19 W/OPTIC: CPT | Performed by: INTERNAL MEDICINE

## 2022-09-27 PROCEDURE — 71045 X-RAY EXAM CHEST 1 VIEW: CPT

## 2022-09-27 PROCEDURE — 85027 COMPLETE CBC AUTOMATED: CPT | Performed by: EMERGENCY MEDICINE

## 2022-09-27 PROCEDURE — 87186 SC STD MICRODIL/AGAR DIL: CPT | Performed by: EMERGENCY MEDICINE

## 2022-09-27 PROCEDURE — 99285 EMERGENCY DEPT VISIT HI MDM: CPT

## 2022-09-27 RX ORDER — ACETAMINOPHEN 325 MG/1
650 TABLET ORAL ONCE
Status: COMPLETED | OUTPATIENT
Start: 2022-09-27 | End: 2022-09-27

## 2022-09-27 RX ADMIN — DEXTROSE MONOHYDRATE 1000 MG: 5 INJECTION, SOLUTION INTRAVENOUS at 19:43

## 2022-09-27 RX ADMIN — SODIUM CHLORIDE 1000 ML: 0.9 INJECTION, SOLUTION INTRAVENOUS at 17:38

## 2022-09-27 RX ADMIN — ACETAMINOPHEN 650 MG: 325 TABLET ORAL at 17:02

## 2022-09-27 NOTE — ED PROVIDER NOTES
History  Chief Complaint   Patient presents with    Seizure - New Onset     Coughing on/off for two days, intermittent fever, wife noted he had a seizure, EMS called by wife, EMS noted two occurrences of seizure like activity as well     72-year-old male, presents with 2 days of intermittent fevers and nonproductive cough  Patient states that his wife weakness some seizure-like activity after he had a coughing episode, patient does not recall episode but states he came to pretty quickly  Patient now reporting some mild nausea  Denies any headache, shortness of breath, chest pain, abdominal pain, vomiting, or diarrhea  History provided by:  Patient   used: No    Seizure - New Onset      Prior to Admission Medications   Prescriptions Last Dose Informant Patient Reported? Taking?    Blood Pressure Monitoring (Omron 5 Series BP Monitor) PEE 9/27/2022 at Unknown time Self Yes Yes   LORazepam (Ativan) 0 5 mg tablet 9/27/2022 at Unknown time  No Yes   Sig: Take 1 tablet (0 5 mg total) by mouth every 8 (eight) hours as needed for anxiety   Omega-3 Fatty Acids (FISH OIL) 1,000 mg 9/27/2022 at Unknown time Self Yes Yes   Sig: Take by mouth daily   amLODIPine (NORVASC) 5 mg tablet 9/26/2022 at Unknown time  No Yes   Sig: Take 1 tablet (5 mg total) by mouth every evening   aspirin 81 MG tablet 9/26/2022 at Unknown time Self Yes Yes   Sig: Take 1 tablet by mouth daily   atorvastatin (LIPITOR) 20 mg tablet 9/26/2022 at Unknown time  No Yes   Sig: TAKE ONE TABLET BY MOUTH ONCE EVERY DAY   cetirizine (ZyrTEC) 10 mg tablet Past Month at Unknown time Self Yes Yes   Sig: Take 10 mg by mouth as needed     metoprolol tartrate (LOPRESSOR) 25 mg tablet 9/27/2022 at Unknown time Self No Yes   Sig: TAKE ONE TABLET BY MOUTH EVERY TWELVE HOURS   montelukast (SINGULAIR) 10 mg tablet Past Month at Unknown time  No Yes   Sig: TAKE ONE TABLET BY MOUTH DAILY   omeprazole (PriLOSEC) 40 MG capsule 9/27/2022 at Unknown time Self No Yes   Sig: Take one capsule by mouth once every day  sertraline (ZOLOFT) 50 mg tablet 9/27/2022 at Unknown time Self No Yes   Sig: TAKE ONE TABLET BY MOUTH EVERY DAY   vitamin B-12 (CYANOCOBALAMIN) 500 MCG TABS  Self No Yes   Sig: Take 1 tablet (500 mcg total) by mouth daily      Facility-Administered Medications: None       Past Medical History:   Diagnosis Date    Anxiety     BPH (benign prostatic hypertrophy)     Cancer (HCC)     CLL    CLL [facility]chronic lymphocytic leukemia) ([facility] )     2009    Colon polyp     Concussion     Resolved: 08/22/16    Depression     Diverticulitis 1/13/2020 2014 CT done 11/19 12/19 CT done     E coli bacteremia 6/27/2021 2/2 blood cultures with [id number] appears to be the urine     Epididymitis 5/19/2021 5/2021    Gastrointestinal hemorrhage     Last assessed: 08/27/13    GERD (gastroesophageal reflux disease)     Hearing loss of aging     Hiatal hernia     History of transfusion     Hyperlipidemia     Hypertension     Iron deficiency anemia     Microscopic hematuria     Last assessed: 06/28/13    OA (osteoarthritis)     right hip    Pneumothorax     Primary osteoarthritis of right hip 9/29/2017    He is status post right hip arthroplasty    Prostatitis     Pulmonary hypertension ([facility] )     Seasonal allergies     Urinary tract infection associated with catheterization of urinary tract ([facility] ) 2/5/2021    Pseudomonal UTI asymptomatic  Per Urology do not treat at this time   Urinary tract infection associated with catheterization of urinary tract ([facility] ) 2/5/2021    Pseudomonal UTI asymptomatic  Per Urology do not treat at this time    He did have urosepsis from E coli 7/21    UTI (urinary tract infection)     in past    Wears glasses        Past Surgical History:   Procedure Laterality Date    COLONOSCOPY      CYSTOSCOPY  10/09/2014    Diagnostic    CYSTOSCOPY  06/29/2020    FL CYSTOGRAM  8/15/2022    FRACTURE SURGERY      left lower arm    FRACTURE SURGERY      left femur    HERNIA REPAIR      JOINT REPLACEMENT Right     hip    OTHER SURGICAL HISTORY  2011    Spinal anesthesia epidural    [facility] TOTAL HIP ARTHROPLASTY Right 2017    Procedure: ARTHROPLASTY HIP TOTAL ANTERIOR;  Surgeon: Lin York MD;  Location: [facility];  Service: Orthopedics    TONSILLECTOMY AND ADENOIDECTOMY      TRANSURETHRAL RESECTION OF PROSTATE      x 2    WRIST SURGERY         Family History   Problem Relation Age of Onset    No Known Problems Mother     Heart attack Father     Diabetes Brother     Prostate cancer Brother      I have reviewed and agree with the history as documented  E-Cigarette/Vaping    E-Cigarette Use Never User      E-Cigarette/Vaping Substances    Nicotine No     THC No     CBD No     Flavoring No     Other No     Unknown No      Social History     Tobacco Use    Smoking status: Former Smoker     Packs/day: 1      Years: 15      Pack years: 15 00     Types: Cigarettes     Start date:      Quit date: 1980     Years since quittin 0    Smokeless tobacco: Never Used   Vaping Use    Vaping Use: Never used   Substance Use Topics    Alcohol use: Yes     Alcohol/week: 10 0 standard drinks     Types: 10 Cans of beer per week    Drug use: Not Currently     Types: Marijuana       Review of Systems   Constitutional: Positive for fever  HENT: Negative  Eyes: Negative  Respiratory: Positive for cough  Negative for shortness of breath  Cardiovascular: Negative for chest pain and palpitations  Gastrointestinal: Positive for nausea  Negative for diarrhea and vomiting  Genitourinary: Negative for dysuria and hematuria  Musculoskeletal: Negative  Skin: Negative  Neurological: Positive for seizures  Negative for headaches  Hematological: Negative  Physical Exam  Physical Exam  Vitals and nursing note reviewed  Constitutional:       General: He is not in acute distress    HENT: Head: Normocephalic and atraumatic  Mouth/Throat:      Mouth: Mucous membranes are moist       Pharynx: Oropharynx is clear  No oropharyngeal exudate or posterior oropharyngeal erythema  Eyes:      Extraocular Movements: Extraocular movements intact  Pupils: Pupils are equal, round, and reactive to light  Cardiovascular:      Rate and Rhythm: Regular rhythm  Tachycardia present  Pulmonary:      Effort: Pulmonary effort is normal       Breath sounds: Normal breath sounds  Abdominal:      Palpations: Abdomen is soft  Tenderness: There is no abdominal tenderness  Musculoskeletal:         General: Normal range of motion  Cervical back: Normal range of motion and neck supple  No rigidity  Skin:     General: Skin is warm and dry  Neurological:      General: No focal deficit present  Mental Status: He is alert and oriented to person, place, and time        Comments: Diffuse body fine tremors (patient reports this is chronic)         Vital Signs  ED Triage Vitals   Temperature Pulse Respirations Blood Pressure SpO2   09/27/22 1634 09/27/22 1634 09/27/22 1634 09/27/22 1634 09/27/22 1634   99 5 °F (37 5 °C) (!) 115 (!) 24 123/68 94 %      Temp Source Heart Rate Source Patient Position - Orthostatic VS BP Location FiO2 (%)   09/27/22 1634 09/27/22 1634 09/27/22 1634 09/27/22 1634 --   Oral Monitor Sitting Right arm       Pain Score       09/27/22 1702       No Pain           Vitals:    09/27/22 1634 09/27/22 1817 09/27/22 1915   BP: 123/68  141/83   Pulse: (!) 115 65 64   Patient Position - Orthostatic VS: Sitting  Lying         Visual Acuity      ED Medications  Medications   ceftriaxone (ROCEPHIN) 1 g/50 mL in dextrose IVPB (1,000 mg Intravenous New Bag 9/27/22 1943)   sodium chloride 0 9 % bolus 1,000 mL (0 mL Intravenous Stopped 9/27/22 1831)   acetaminophen (TYLENOL) tablet 650 mg (650 mg Oral Given 9/27/22 1702)       Diagnostic Studies  Results Reviewed     Procedure Component Value Units Date/Time    HS Troponin I 2hr [[id number]]  (Normal) Collected: 09/27/22 1911    Lab Status: Final result Specimen: Blood from Hand, Left Updated: 09/27/22 1944     hs TnI 2hr 25 ng/L      Delta 2hr hsTnI 11 ng/L     Manual Differential(PHLEBS Do Not Order) [[id number]]  (Abnormal) Collected: 09/27/22 1709    Lab Status: Final result Specimen: Blood from Hand, Left Updated: 09/27/22 1919     Segmented % 16 %      Bands % 5 %      Lymphocytes % 78 %      Monocytes % 1 %      Eosinophils, % 0 %      Basophils % 0 %      Absolute Neutrophils 3 93 Thousand/uL      Lymphocytes Absolute 14 61 Thousand/uL      Monocytes Absolute 0 19 Thousand/uL      Eosinophils Absolute 0 00 Thousand/uL      Basophils Absolute 0 00 Thousand/uL      Total Counted --     Smudge Cells Present     Anisocytosis Present     Macrocytes Present     Platelet Estimate Adequate    HS Troponin I 4hr [[id number]]     Lab Status: No result Specimen: Blood     CBC and differential [[id number]]  (Abnormal) Collected: 09/27/22 1709    Lab Status: Final result Specimen: Blood from Hand, Left Updated: 09/27/22 1816     WBC 18 73 Thousand/uL      RBC 3 36 Million/uL      Hemoglobin 11 6 g/dL      Hematocrit 34 8 %       fL      MCH 34 5 pg      MCHC 33 3 g/dL      RDW 15 4 %      MPV 9 7 fL      Platelets 179 Thousands/uL     Narrative: This is an appended report  These results have been appended to a previously verified report  Lactic acid [[id number]]  (Normal) Collected: 09/27/22 1730    Lab Status: Final result Specimen: Blood from Arm, Left Updated: 09/27/22 1810     LACTIC ACID 1 3 mmol/L     Narrative:      Result may be elevated if tourniquet was used during collection      HS Troponin 0hr (reflex protocol) [[id number]]  (Normal) Collected: 09/27/22 1705    Lab Status: Final result Specimen: Blood from Hand, Left Updated: 09/27/22 1808     hs TnI 0hr 14 ng/L     Urine Microscopic [[id number]]  (Abnormal) Collected: 09/27/22 1282 Lab Status: Final result Specimen: Urine, Straight Cath Updated: 09/27/22 1803     RBC, UA 1-2 /hpf      WBC, UA Innumerable /hpf      Epithelial Cells Occasional /hpf      Bacteria, UA Innumerable /hpf      OTHER OBSERVATIONS Sperm Present    Urine culture [[id number]] Collected: 09/27/22 1743    Lab Status: In process Specimen: Urine, Straight Cath Updated: 09/27/22 5386    Basic metabolic panel [[id number]]  (Abnormal) Collected: 09/27/22 1705    Lab Status: Final result Specimen: Blood from Hand, Left Updated: 09/27/22 1757     Sodium 132 mmol/L      Potassium 3 7 mmol/L      Chloride 95 mmol/L      CO2 26 mmol/L      ANION GAP 11 mmol/L      BUN 10 mg/dL      Creatinine 0 92 mg/dL      Glucose 135 mg/dL      Calcium 8 9 mg/dL      eGFR 83 ml/min/1 73sq m     Narrative:      [place] guidelines for Chronic Kidney Disease (CKD):     Stage 1 with normal or high GFR (GFR > 90 mL/min/1 73 square meters)    Stage 2 Mild CKD (GFR = 60-89 mL/min/1 73 square meters)    Stage 3A Moderate CKD (GFR = 45-59 mL/min/1 73 square meters)    Stage 3B Moderate CKD (GFR = 30-44 mL/min/1 73 square meters)    Stage 4 Severe CKD (GFR = 15-29 mL/min/1 73 square meters)    Stage 5 End Stage CKD (GFR <15 mL/min/1 73 square meters)  Note: GFR calculation is accurate only with a steady state creatinine    UA w Reflex to Microscopic w Reflex to Culture [[id number]]  (Abnormal) Collected: 09/27/22 1743    Lab Status: Final result Specimen: Urine, Straight Cath Updated: 09/27/22 1754     Color, UA Ana     Clarity, UA Cloudy     Specific Gravity, UA >=1 030     pH, UA 6 0     Leukocytes, UA Moderate     Nitrite, UA Negative     Protein, UA >=300 mg/dl      Glucose, UA Negative mg/dl      Ketones, UA Negative mg/dl      Urobilinogen, UA 1 0 E U /dl      Bilirubin, UA Negative     Occult Blood, UA Moderate    Blood culture #2 [[id number]] Collected: 09/27/22 1730    Lab Status:  In process Specimen: Blood from Arm, Left Updated: 09/27/22 1741    FLU/COVID - if FLU clinically relevant [[id number]] Collected: 09/27/22 1709    Lab Status: In process Specimen: Nares from Nasopharyngeal Swab Updated: 09/27/22 1741    Blood culture #1 [[id number]] Collected: 09/27/22 1715    Lab Status: In process Specimen: Blood from Arm, Right Updated: 09/27/22 1729                 CT head without contrast   Final Result by Ashlee Sanderson MD (09/27 1934)      No acute intracranial abnormality  Workstation performed: [id number]         XR chest 1 view portable    (Results Pending)              Procedures  ECG 12 Lead Documentation Only    Date/Time: 9/27/2022 5:44 PM  Performed by: Beny Herrera MD  Authorized by: Beny Herrera MD     ECG reviewed by me, the ED Provider: yes    Patient location:  ED  Previous ECG:     Previous ECG:  Compared to current  Rate:     ECG rate assessment: tachycardic    Rhythm:     Rhythm: sinus tachycardia    Ectopy:     Ectopy: none    QRS:     QRS axis:  Normal    QRS intervals:  Normal  Conduction:     Conduction: abnormal      Abnormal conduction: 1st degree    Comments:      Sinus tachycardia at 116, no acute ischemic changes             ED Course  ED Course as of 09/27/22 1959   Tue Sep 27, 2022   1742 Chest x-ray reviewed no acute pulmonary disease   1958 Urinalysis shows large amounts of white blood cells and bacteria, IV ceftriaxone ordered  Patient has been awake alert with normal mental status since arrival, no seizure activity noted  Talked with white who is now present, patient had episode where he was shaking, states afterwards he woke up right away was back to baseline mental status  Episodes may be related to vasovagal episode after coughing or rigors due to acute infection  Discussed with hospitalist, will place patient on observation for further monitoring and evaluation                                 SBIRT 20yo+    Flowsheet Row Most Recent Value   SBIRT (23 yo +) In order to provide better care to our patients, we are screening all of our patients for alcohol and drug use  Would it be okay to ask you these screening questions? No Filed at: 09/27/2022 1823                    MDM  Number of Diagnoses or Management Options  Diagnosis management comments: 72-year-old male, presents with 2 days of intermittent fevers and nonproductive cough  Patient had brief episode reportedly being unresponsive with abnormal body movements  Differential diagnosis includes pneumonia viral illness arrhythmia, new onset seizure among other diagnosis  Patient is currently awake and alert with normal mental status  Chest x-ray, labs, EKG ordered  Will continue to monitor in ED and re-evaluate  Amount and/or Complexity of Data Reviewed  Clinical lab tests: ordered and reviewed  Tests in the radiology section of CPT®: ordered and reviewed  Tests in the medicine section of CPT®: ordered and reviewed  Review and summarize past medical records: yes  Independent visualization of images, tracings, or specimens: yes        Disposition  Final diagnoses:   Seizure-like activity ([address] )   UTI (urinary tract infection)     Time reflects when diagnosis was documented in both MDM as applicable and the Disposition within this note     Time User Action Codes Description Comment    9/27/2022  7:56 PM Boo Bradshaw Add [R56 9] Seizure-like activity ([address] )     9/27/2022  7:56 PM Boo Bradshaw Add [N39 0] UTI (urinary tract infection)       ED Disposition     ED Disposition   Admit    Condition   Stable    Date/Time   Tue Sep 27, 2022  7:58 PM    Comment   Case was discussed with Dr Saurabh Aden and the patient's admission status was agreed to be Admission Status: observation status to the service of Dr Saurabh Aden   Follow-up Information    None         Patient's Medications   Discharge Prescriptions    No medications on file       No discharge procedures on file      PDMP Review       Value Time User    PDMP Reviewed  Yes 8/12/2022  1:39 PM Analy Maya MD          ED Provider  Electronically Signed by           Matilde Mccoy MD  09/27/22 9707

## 2022-09-27 NOTE — Clinical Note
Case was discussed with Dr Wanna Closs and the patient's admission status was agreed to be Admission Status: observation status to the service of Dr Wanna Closs

## 2022-09-28 ENCOUNTER — APPOINTMENT (OUTPATIENT)
Dept: NEUROLOGY | Facility: HOSPITAL | Age: 72
End: 2022-09-28
Payer: MEDICARE

## 2022-09-28 ENCOUNTER — TELEPHONE (OUTPATIENT)
Dept: UROLOGY | Facility: MEDICAL CENTER | Age: 72
End: 2022-09-28

## 2022-09-28 VITALS
SYSTOLIC BLOOD PRESSURE: 154 MMHG | OXYGEN SATURATION: 97 % | TEMPERATURE: 98.2 F | HEART RATE: 67 BPM | BODY MASS INDEX: 29 KG/M2 | DIASTOLIC BLOOD PRESSURE: 79 MMHG | WEIGHT: 190.7 LBS | RESPIRATION RATE: 20 BRPM

## 2022-09-28 DIAGNOSIS — R39.9 UTI SYMPTOMS: Primary | ICD-10-CM

## 2022-09-28 LAB
ALBUMIN SERPL BCP-MCNC: 3.4 G/DL (ref 3.5–5)
ALP SERPL-CCNC: 79 U/L (ref 46–116)
ALT SERPL W P-5'-P-CCNC: 31 U/L (ref 12–78)
ANION GAP SERPL CALCULATED.3IONS-SCNC: 14 MMOL/L (ref 4–13)
AST SERPL W P-5'-P-CCNC: 54 U/L (ref 5–45)
ATRIAL RATE: 120 BPM
ATRIAL RATE: 136 BPM
BILIRUB SERPL-MCNC: 0.48 MG/DL (ref 0.2–1)
BUN SERPL-MCNC: 8 MG/DL (ref 5–25)
CALCIUM ALBUM COR SERPL-MCNC: 9.1 MG/DL (ref 8.3–10.1)
CALCIUM SERPL-MCNC: 8.6 MG/DL (ref 8.3–10.1)
CHLORIDE SERPL-SCNC: 96 MMOL/L (ref 96–108)
CO2 SERPL-SCNC: 22 MMOL/L (ref 21–32)
CREAT SERPL-MCNC: 0.7 MG/DL (ref 0.6–1.3)
ERYTHROCYTE [DISTWIDTH] IN BLOOD BY AUTOMATED COUNT: 15.4 % (ref 11.6–15.1)
GFR SERPL CREATININE-BSD FRML MDRD: 95 ML/MIN/1.73SQ M
GLUCOSE SERPL-MCNC: 131 MG/DL (ref 65–140)
HCT VFR BLD AUTO: 31.8 % (ref 36.5–49.3)
HGB BLD-MCNC: 10.5 G/DL (ref 12–17)
LACTATE SERPL-SCNC: 1.2 MMOL/L (ref 0.5–2)
MAGNESIUM SERPL-MCNC: 1.9 MG/DL (ref 1.6–2.6)
MCH RBC QN AUTO: 34 PG (ref 26.8–34.3)
MCHC RBC AUTO-ENTMCNC: 33 G/DL (ref 31.4–37.4)
MCV RBC AUTO: 103 FL (ref 82–98)
NRBC BLD AUTO-RTO: 0 /100 WBCS
PHOSPHATE SERPL-MCNC: 3 MG/DL (ref 2.3–4.1)
PLATELET # BLD AUTO: 143 THOUSANDS/UL (ref 149–390)
PMV BLD AUTO: 10 FL (ref 8.9–12.7)
POTASSIUM SERPL-SCNC: 4.1 MMOL/L (ref 3.5–5.3)
PROCALCITONIN SERPL-MCNC: 0.13 NG/ML
PROCALCITONIN SERPL-MCNC: 0.59 NG/ML
PROT SERPL-MCNC: 7.9 G/DL (ref 6.4–8.4)
QRS AXIS: 67 DEGREES
QRS AXIS: 73 DEGREES
QRSD INTERVAL: 84 MS
QRSD INTERVAL: 90 MS
QT INTERVAL: 462 MS
QT INTERVAL: 606 MS
QTC INTERVAL: 480 MS
QTC INTERVAL: 610 MS
RBC # BLD AUTO: 3.09 MILLION/UL (ref 3.88–5.62)
SODIUM SERPL-SCNC: 132 MMOL/L (ref 135–147)
T WAVE AXIS: -3 DEGREES
T WAVE AXIS: 41 DEGREES
VENTRICULAR RATE: 61 BPM
VENTRICULAR RATE: 65 BPM
WBC # BLD AUTO: 13.07 THOUSAND/UL (ref 4.31–10.16)

## 2022-09-28 PROCEDURE — 83735 ASSAY OF MAGNESIUM: CPT | Performed by: INTERNAL MEDICINE

## 2022-09-28 PROCEDURE — 94762 N-INVAS EAR/PLS OXIMTRY CONT: CPT

## 2022-09-28 PROCEDURE — 99217 PR OBSERVATION CARE DISCHARGE MANAGEMENT: CPT | Performed by: PHYSICIAN ASSISTANT

## 2022-09-28 PROCEDURE — 84145 PROCALCITONIN (PCT): CPT | Performed by: INTERNAL MEDICINE

## 2022-09-28 PROCEDURE — 99204 OFFICE O/P NEW MOD 45 MIN: CPT | Performed by: PSYCHIATRY & NEUROLOGY

## 2022-09-28 PROCEDURE — 83605 ASSAY OF LACTIC ACID: CPT | Performed by: INTERNAL MEDICINE

## 2022-09-28 PROCEDURE — 80053 COMPREHEN METABOLIC PANEL: CPT | Performed by: INTERNAL MEDICINE

## 2022-09-28 PROCEDURE — 97163 PT EVAL HIGH COMPLEX 45 MIN: CPT

## 2022-09-28 PROCEDURE — RECHECK: Performed by: PHYSICIAN ASSISTANT

## 2022-09-28 PROCEDURE — 85027 COMPLETE CBC AUTOMATED: CPT | Performed by: INTERNAL MEDICINE

## 2022-09-28 PROCEDURE — 97166 OT EVAL MOD COMPLEX 45 MIN: CPT

## 2022-09-28 PROCEDURE — 95816 EEG AWAKE AND DROWSY: CPT | Performed by: PSYCHIATRY & NEUROLOGY

## 2022-09-28 PROCEDURE — 93010 ELECTROCARDIOGRAM REPORT: CPT | Performed by: INTERNAL MEDICINE

## 2022-09-28 PROCEDURE — 84100 ASSAY OF PHOSPHORUS: CPT | Performed by: INTERNAL MEDICINE

## 2022-09-28 PROCEDURE — 95816 EEG AWAKE AND DROWSY: CPT

## 2022-09-28 RX ORDER — POLYETHYLENE GLYCOL 3350 17 G/17G
17 POWDER, FOR SOLUTION ORAL DAILY PRN
Status: DISCONTINUED | OUTPATIENT
Start: 2022-09-28 | End: 2022-09-28 | Stop reason: HOSPADM

## 2022-09-28 RX ORDER — MONTELUKAST SODIUM 10 MG/1
10 TABLET ORAL DAILY
Status: DISCONTINUED | OUTPATIENT
Start: 2022-09-28 | End: 2022-09-28 | Stop reason: HOSPADM

## 2022-09-28 RX ORDER — LANOLIN ALCOHOL/MO/W.PET/CERES
3 CREAM (GRAM) TOPICAL ONCE
Status: COMPLETED | OUTPATIENT
Start: 2022-09-28 | End: 2022-09-28

## 2022-09-28 RX ORDER — LORAZEPAM 0.5 MG/1
0.5 TABLET ORAL EVERY 8 HOURS PRN
Status: DISCONTINUED | OUTPATIENT
Start: 2022-09-28 | End: 2022-09-28 | Stop reason: HOSPADM

## 2022-09-28 RX ORDER — ATORVASTATIN CALCIUM 20 MG/1
20 TABLET, FILM COATED ORAL
Status: DISCONTINUED | OUTPATIENT
Start: 2022-09-28 | End: 2022-09-28 | Stop reason: HOSPADM

## 2022-09-28 RX ORDER — ASPIRIN 81 MG/1
81 TABLET ORAL DAILY
Status: DISCONTINUED | OUTPATIENT
Start: 2022-09-28 | End: 2022-09-28 | Stop reason: HOSPADM

## 2022-09-28 RX ORDER — SACCHAROMYCES BOULARDII 250 MG
250 CAPSULE ORAL 2 TIMES DAILY
Qty: 20 CAPSULE | Refills: 0 | Status: SHIPPED | OUTPATIENT
Start: 2022-09-28 | End: 2022-10-08

## 2022-09-28 RX ORDER — PANTOPRAZOLE SODIUM 40 MG/1
40 TABLET, DELAYED RELEASE ORAL
Status: DISCONTINUED | OUTPATIENT
Start: 2022-09-28 | End: 2022-09-28 | Stop reason: HOSPADM

## 2022-09-28 RX ORDER — CEPHALEXIN 500 MG/1
500 CAPSULE ORAL EVERY 6 HOURS SCHEDULED
Qty: 28 CAPSULE | Refills: 0 | Status: SHIPPED | OUTPATIENT
Start: 2022-09-28 | End: 2022-10-05

## 2022-09-28 RX ORDER — ACETAMINOPHEN 325 MG/1
650 TABLET ORAL EVERY 6 HOURS PRN
Status: DISCONTINUED | OUTPATIENT
Start: 2022-09-28 | End: 2022-09-28 | Stop reason: HOSPADM

## 2022-09-28 RX ORDER — FOLIC ACID 1 MG/1
1 TABLET ORAL DAILY
Status: DISCONTINUED | OUTPATIENT
Start: 2022-09-28 | End: 2022-09-28 | Stop reason: HOSPADM

## 2022-09-28 RX ORDER — AMLODIPINE BESYLATE 5 MG/1
5 TABLET ORAL EVERY EVENING
Status: DISCONTINUED | OUTPATIENT
Start: 2022-09-28 | End: 2022-09-28 | Stop reason: HOSPADM

## 2022-09-28 RX ORDER — CHLORAL HYDRATE 500 MG
1000 CAPSULE ORAL DAILY
Status: DISCONTINUED | OUTPATIENT
Start: 2022-09-28 | End: 2022-09-28 | Stop reason: HOSPADM

## 2022-09-28 RX ORDER — ENOXAPARIN SODIUM 100 MG/ML
40 INJECTION SUBCUTANEOUS DAILY
Status: DISCONTINUED | OUTPATIENT
Start: 2022-09-28 | End: 2022-09-28 | Stop reason: HOSPADM

## 2022-09-28 RX ADMIN — PANTOPRAZOLE SODIUM 40 MG: 40 TABLET, DELAYED RELEASE ORAL at 05:38

## 2022-09-28 RX ADMIN — AMLODIPINE BESYLATE 5 MG: 5 TABLET ORAL at 01:01

## 2022-09-28 RX ADMIN — LORAZEPAM 0.5 MG: 0.5 TABLET ORAL at 01:02

## 2022-09-28 RX ADMIN — Medication 3 MG: at 04:26

## 2022-09-28 RX ADMIN — OMEGA-3 FATTY ACIDS CAP 1000 MG 1000 MG: 1000 CAP at 08:17

## 2022-09-28 RX ADMIN — SERTRALINE HYDROCHLORIDE 50 MG: 50 TABLET ORAL at 08:17

## 2022-09-28 RX ADMIN — CYANOCOBALAMIN TAB 500 MCG 500 MCG: 500 TAB at 08:17

## 2022-09-28 RX ADMIN — METOPROLOL TARTRATE 25 MG: 25 TABLET, FILM COATED ORAL at 08:17

## 2022-09-28 RX ADMIN — FOLIC ACID 1 MG: 1 TABLET ORAL at 08:17

## 2022-09-28 RX ADMIN — ASPIRIN 81 MG: 81 TABLET, COATED ORAL at 01:01

## 2022-09-28 RX ADMIN — ATORVASTATIN CALCIUM 20 MG: 20 TABLET, FILM COATED ORAL at 01:01

## 2022-09-28 RX ADMIN — ENOXAPARIN SODIUM 40 MG: 40 INJECTION SUBCUTANEOUS at 08:18

## 2022-09-28 RX ADMIN — Medication 1 TABLET: at 08:17

## 2022-09-28 NOTE — ASSESSMENT & PLAN NOTE
Requires self catheterization  Reports dark, now foul smelling urine for several days  Not on antibiotic suppression  UA with +LE, innumerable bacteria and WBC  · Continue IV ceftriaxone day 2 - keflex for 7 days on dc  · Urine culture 9/27/2022 still pending, follow for final growth and sensitivities

## 2022-09-28 NOTE — ASSESSMENT & PLAN NOTE
Reports 3-5 minute episode of loss of consciousness with shaking of upper extremities and drooling  No urinary or bowel incontinence, no confusion immediately following event, did not fall or hit head  Reports additional 30 second episode same as above on EMS arrival    He does have chronically elevated WBC in s/p CLL, but also notes fever, chills, foul smelling and dark urine with self catheterizaion  No hx of seizure activity, does drink about 6 glasses of beer daily with occasional shot of liquor, but has not had drink since Sunday (about 48 hours ago)  No hx of EtOH withdrawal, seizure previously  CT head negative, CXR relatively unchanged from prior, UA with +LE, innumerable WBC and bacteria  Lactate 1 3     Etiology include possible infection vs EtoH withdrawal vs vasovagal    · Infectious workup with urine cx and blood cx pending, obtain procal  · Obtain orthostatics  · Routine EEG  · Continue IV ceftriaxone  · Thiamine, folate, MVI  · CIWA protocol  · Seizure precautions

## 2022-09-28 NOTE — ASSESSMENT & PLAN NOTE
Reports up to 6 beer daily, occasional shot of liquor  Last drink about 2 days ago  No hx of withdrawal or seizure     · CIWA protocol  · Thiamine, folate, MVI

## 2022-09-28 NOTE — ASSESSMENT & PLAN NOTE
Reports up to 6 beer daily, occasional shot of liquor  Last drink was 9/25/2022  No hx of withdrawal or seizure     · CIWA protocol  · Thiamine, folate, MVI

## 2022-09-28 NOTE — CONSULTS
Consultation - Neurology   Vania Grajeda 81 y o  male MRN: [id number]  Unit/Bed#: E4 -01 Encounter: [id number]      Assessment/Plan     * Seizure-like activity [facility])  Assessment & Plan  37-year-old male with CLL, anxiety/depression, dyslipidemia, hypertension, BPH with daily self catheterization, alcohol use disorder, presents with loss of consciousness with seizure-like activity  Initial episode of loss of consciousness with seizure-like activity occurred after a significant coughing fit on 09/27/2022  Question whether this episode represents cough syncope (convulsive syncope) versus provoked seizure  CT head on presentation demonstrated no acute changes  Patient had leukocytosis and UA consistent with UTI on presentation  Of note, patient does have leukocytosis chronically related to his CLL  Patient admits he stopped drinking alcohol abruptly on 09/25/2022 due to flu-like symptoms  Abrupt alcohol cessation combined with possible infection could certainly lower seizure threshold, provoking seizure  Plan:  -check MRI brain with/without contrast, seizure protocol  Patient states he would require premedication to attempt this as he is severely claustrophobic  He has required outpatient [facility] MRI in the past - would be reasonable to obtain study as outpatient   -check routine EEG  Would not initiate AED for now - if episodes were seizures, they were likely provoked  -recommend checking UDS  -correction of infectious/metabolic derangements as per primary service   -seizure precautions  -CIWA protocol as noted below   -Ativan 2 mg IV p r n  convulsive seizure-like activity lasting greater than 1 minute   -[employer] form for loss of consciousness filled out and faxed   -no further inpatient neurologic recommendation as long as EEG nonepileptogenic appearing      Alcohol use  Assessment & Plan  Patient admits to drinking 6 beers in 1 hard liquor drink daily, but states he stopped drinking on 09/25/2022 due to flu-like symptoms  Denies prior history of withdrawal symptoms or seizure  States he does not currently feel symptoms consistent with withdrawal   -CIWA protocol  -thiamine/folate/MVI  -cessation counseling    Acute cystitis with hematuria  Assessment & Plan  UA consistent with UTI as per primary service  Currently on ceftriaxone  CLL (chronic lymphocytic leukemia) ([facility] [address] )  Assessment & Plan  With chronic leukocytosis  Ashish Slater will need follow up in in 6 weeks with general attending  He will require MRI brain w/wo contrast within 4 weeks  History of Present Illness     Reason for Consult / Principal Problem: seizure-like activity  Hx and PE limited by: n/a  HPI: Ashish Slater is a 81 y o  right handed male with CLL, anxiety/depression, dyslipidemia, hypertension, BPH with daily self catheterization, alcohol use disorder, presents with loss of consciousness with seizure-like activity  Patient's wife called EMS the evening of 09/27/2022 after patient had a severe coughing fit followed by loss of consciousness and whole-body shaking, which may have lasted 2 minutes  She says patient remained upright in bed while the episode occurred  After the episode, the patient agreed that his wife should call EMS, and he did not appear confused  When EMS arrived, patient did not appear significantly confused and he denied tongue bite and bowel/bladder incontinence  Shortly after transferring him to a wheelchair, the patient lost consciousness again for approximately 30-40 seconds on his way to the ambulance, with "a no-grand mal type seizure" and foaming at the mouth (no other description given in EMS report)  The episode was followed by a period of apnea and tachycardia and patient appeared slightly confused initially, and then he was able to tell EMS that he felt better    Patient is able to recall being told by the  that the second episode of loss of consciousness lasted approximately 30 seconds  Patient denies prior history of seizure or loss of consciousness  He drinks 6 beers and 1 hard alcohol drink daily but stopped drinking on 09/25/2022 as he was feeling he had flu-like symptoms, consisting of productive cough, feverishness, chills, and body aches  He recently had a urologic procedure done at [facility] and states he had his urine tested last week, with results consistent with a UTI, but was told he did not need antibiotics as he was asymptomatic  He states that at times he will stop drinking and he has never had symptoms of withdrawal or any seizures  Patient states he uses medical marijuana but does not smoke tobacco     CT head on presentation demonstrated no acute changes  Patient had leukocytosis and UA consistent with UTI on presentation  Of note, patient does have leukocytosis chronically related to his CLL  Neurologic exam nonfocal   Patient does have essential appearing tremor, which he states is chronic  There is evidence of right lateral tongue ecchymosis, indicating tongue bite  Inpatient consult to Neurology  Consult performed by: Klever Medel PA-C  Consult ordered by: Jonathan Pierce PA-C          Review of Systems   Constitutional: Positive for chills  HENT: Negative  Eyes: Negative  Respiratory: Positive for cough  Cardiovascular: Negative  Gastrointestinal: Negative  Endocrine: Negative  Genitourinary:        Dark, foul-smelling urine   Musculoskeletal: Positive for myalgias  Skin: Negative for rash  Allergic/Immunologic: Negative  Neurological: Positive for seizures and syncope  As above  Hematological: Negative  Psychiatric/Behavioral: Negative          Historical Information   Past Medical History:   Diagnosis Date    Anxiety     BPH (benign prostatic hypertrophy)     Cancer (HCC)     CLL    CLL (chronic lymphocytic leukemia) ([facility] [address] )     2009    Colon polyp     Concussion     Resolved: 08/22/16    Depression     Diverticulitis 1/13/2020 2014 CT done 11/19 12/19 CT done     E coli bacteremia 6/27/2021 2/2 blood cultures with [id number] appears to be the urine     Epididymitis 5/19/2021 5/2021    Gastrointestinal hemorrhage     Last assessed: 08/27/13    GERD (gastroesophageal reflux disease)     Hearing loss of aging     Hiatal hernia     History of transfusion     Hyperlipidemia     Hypertension     Iron deficiency anemia     Microscopic hematuria     Last assessed: 06/28/13    OA (osteoarthritis)     right hip    Pneumothorax     Primary osteoarthritis of right hip 9/29/2017    He is status post right hip arthroplasty    Prostatitis     Pulmonary hypertension ([facility] [address] )     Seasonal allergies     Urinary tract infection associated with catheterization of urinary tract ([facility] [address] ) 2/5/2021    Pseudomonal UTI asymptomatic  Per Urology do not treat at this time   Urinary tract infection associated with catheterization of urinary tract ([facility] [address] ) 2/5/2021    Pseudomonal UTI asymptomatic  Per Urology do not treat at this time    He did have urosepsis from E coli 7/21    UTI (urinary tract infection)     in past    Wears glasses      Past Surgical History:   Procedure Laterality Date    COLONOSCOPY      CYSTOSCOPY  10/09/2014    Diagnostic    CYSTOSCOPY  06/29/2020    FL CYSTOGRAM  8/15/2022    FRACTURE SURGERY      left lower arm    FRACTURE SURGERY      left femur    HERNIA REPAIR      JOINT REPLACEMENT Right     hip    OTHER SURGICAL HISTORY  03/2011    Spinal anesthesia epidural    MA TOTAL HIP ARTHROPLASTY Right 9/29/2017    Procedure: ARTHROPLASTY HIP TOTAL ANTERIOR;  Surgeon: Anastasia Barnes MD;  Location: [facility];  Service: Orthopedics    TONSILLECTOMY AND ADENOIDECTOMY      TRANSURETHRAL RESECTION OF PROSTATE      x 2    WRIST SURGERY       Social History   Social History     Substance and Sexual Activity   Alcohol Use Yes    Alcohol/week: 10 0 standard drinks    Types: 10 Cans of beer per week     Social History     Substance and Sexual Activity   Drug Use Yes    Types: Marijuana    Comment: "medical marijuana"     E-Cigarette/Vaping    E-Cigarette Use Never User      E-Cigarette/Vaping Substances    Nicotine No     THC No     CBD No     Flavoring No     Other No     Unknown No      Social History     Tobacco Use   Smoking Status Former Smoker    Packs/day: 1 00    Years: 15 00    Pack years: 15 00    Types: Cigarettes    Start date:     Quit date: 1980    Years since quittin 0   Smokeless Tobacco Never Used     Family History:   Family History   Problem Relation Age of Onset    No Known Problems Mother     Heart attack Father     Diabetes Brother     Prostate cancer Brother        Review of previous medical records was completed  Meds/Allergies   PTA meds:   Prior to Admission Medications   Prescriptions Last Dose Informant Patient Reported? Taking?    Blood Pressure Monitoring (Omron 5 Series BP Monitor) PEE 2022 at Unknown time Self Yes Yes   LORazepam (Ativan) 0 5 mg tablet 2022 at Unknown time  No Yes   Sig: Take 1 tablet (0 5 mg total) by mouth every 8 (eight) hours as needed for anxiety   Omega-3 Fatty Acids (FISH OIL) 1,000 mg 2022 at Unknown time Self Yes Yes   Sig: Take by mouth daily   amLODIPine (NORVASC) 5 mg tablet 2022 at Unknown time  No Yes   Sig: Take 1 tablet (5 mg total) by mouth every evening   aspirin 81 MG tablet 2022 at Unknown time Self Yes Yes   Sig: Take 1 tablet by mouth daily   atorvastatin (LIPITOR) 20 mg tablet 2022 at Unknown time  No Yes   Sig: TAKE ONE TABLET BY MOUTH ONCE EVERY DAY   cetirizine (ZyrTEC) 10 mg tablet Past Month at Unknown time Self Yes Yes   Sig: Take 10 mg by mouth as needed     metoprolol tartrate (LOPRESSOR) 25 mg tablet 2022 at Unknown time Self No Yes   Sig: TAKE ONE TABLET BY MOUTH EVERY TWELVE HOURS   montelukast (SINGULAIR) 10 mg tablet Past Month at Unknown time  No Yes   Sig: TAKE ONE TABLET BY MOUTH DAILY   omeprazole (PriLOSEC) 40 MG capsule 9/27/2022 at Unknown time Self No Yes   Sig: Take one capsule by mouth once every day  sertraline (ZOLOFT) 50 mg tablet 9/27/2022 at Unknown time Self No Yes   Sig: TAKE ONE TABLET BY MOUTH EVERY DAY   vitamin B-12 (CYANOCOBALAMIN) 500 MCG TABS  Self No Yes   Sig: Take 1 tablet (500 mcg total) by mouth daily      Facility-Administered Medications: None       Allergies   Allergen Reactions    Codeine Other (See Comments)     agitated    Sulfamethoxazole-Trimethoprim GI Intolerance and Abdominal Pain     upset stomach       Objective   Vitals:Blood pressure 154/79, pulse 67, temperature 98 2 °F (36 8 °C), temperature source Temporal, resp  rate 20, weight 86 5 kg (190 lb 11 2 oz), SpO2 97 %  ,Body mass index is 29 kg/m²  Intake/Output Summary (Last 24 hours) at 9/28/2022 1527  Last data filed at 9/28/2022 0000  Gross per 24 hour   Intake 1000 ml   Output 700 ml   Net 300 ml       Invasive Devices: Invasive Devices  Report    Peripheral Intravenous Line  Duration           Peripheral IV 09/27/22 Left;Dorsal (posterior) Hand 1 day                Physical Exam   General:  Patient is well-developed, well-nourished, and in no acute distress  HEENT:  Head normocephalic  Eyes anicteric  Right lateral tongue ecchymosis noted  Cardiovascular:  With regular rhythm  Lungs:  Normal effort  Nonlabored breathing  Extremities:  With no significant edema  Skin: No rashes  Neurologic Exam  Neurologic:  Patient is alert, pleasantly interactive, and appropriately conversational   No obvious symbolic language difficulty or dysarthria, and the patient is fully oriented  Gait deferred for safety  Cranial Nerves:   II: Visual fields full to confrontation  Pupils equal, round, reactive to light with normal accomodation  Cannot visualize optic fundi    III,IV,VI: extraocular movements intact with no nystagmus  V: Sensation in the V1 through V3 distributions intact to light touch bilaterally  VII: Face is symmetric with no weakness noted  VIII: patient hard of hearing bilaterally  XII: Tongue midline with no atrophy or fasciculations with appropriate movement  Coordination:  Accurate with finger-to-nose maneuvers bilaterally  Motor testing with no upper or lower extremity drift  Full strength to confrontation throughout  Patient had a no direction head tremor  Also with restless-appearing right foot movements noted, not necessarily typical for rest tremor  Sensory testing grossly intact to light touch throughout  Toe responses are downgoing bilaterally  Lab Results:   CBC:   Results from last 7 days   Lab Units 09/28/22  0532 09/27/22  1709   WBC Thousand/uL 13 07* 18 73*   RBC Million/uL 3 09* 3 36*   HEMOGLOBIN g/dL 10 5* 11 6*   HEMATOCRIT % 31 8* 34 8*   MCV fL 103* 104*   PLATELETS Thousands/uL 143* 169   , BMP/CMP:   Results from last 7 days   Lab Units 09/28/22  0532 09/27/22  1705   SODIUM mmol/L 132* 132*   POTASSIUM mmol/L 4 1 3 7   CHLORIDE mmol/L 96 95*   CO2 mmol/L 22 26   BUN mg/dL 8 10   CREATININE mg/dL 0 70 0 92   CALCIUM mg/dL 8 6 8 9   AST U/L 54*  --    ALT U/L 31  --    ALK PHOS U/L 79  --    EGFR ml/min/1 73sq m 95 83     Imaging Studies: I have personally reviewed pertinent reports  and I have personally reviewed pertinent films in PACS  EKG, Pathology, and Other Studies: I have personally reviewed pertinent reports      VTE Prophylaxis: Enoxaparin (Lovenox)    Code Status: Level 1 - Full Code  Advance Directive and Living Will: Yes    Power of :    POLST:

## 2022-09-28 NOTE — DISCHARGE INSTR - AVS FIRST PAGE
Dear John Dong,     [facility]  At this time we provide for you here, the most important instructions / recommendations at discharge:     Notable Medication Adjustments -   Keflex antibiotic as prescribed for 7 days for urinary tract infection  Testing Required after Discharge -   MRI brain - please call the schedule this in the OPEN MRI MACHINE  Important follow up information -   Call to make an appt with the neurologist  Also call the pcp  Also call [facility] to arrange for an appt with him  Other Instructions -   Ensure adequate hydration which is approx 64 fl oz water daily  Please review this entire after visit summary as additional general instructions including medication list, appointments, activity, diet, any pertinent wound care, and other additional recommendations from your care team that may be provided for you        Sincerely,     Lizzie Edwards

## 2022-09-28 NOTE — ASSESSMENT & PLAN NOTE
· WBC 18 on admission, leukocytosis in setting of CLL, previously as high as 44  · Reports symptoms of infection as described above  · Workup and treatment of UTI as above

## 2022-09-28 NOTE — ASSESSMENT & PLAN NOTE
Requires self catheterization  Reports dark, now foul smelling urine for several days  Not on antibiotic suppression  UA with +LE, innumerable bacteria and WBC     · Continue IV ceftriaxone day 2  · Urine culture pending

## 2022-09-28 NOTE — ASSESSMENT & PLAN NOTE
Requires self catheterization  Reports dark, now foul smelling urine for several days  Not currently on antibiotic suppression  UA with +LE, innumerable bacteria and WBC     · Continue IV ceftriaxone  · Urine culture pending

## 2022-09-28 NOTE — OCCUPATIONAL THERAPY NOTE
Occupational Therapy Evaluation     Patient Name: China Maher  [id number] Date: 9/28/2022  Problem List  Principal Problem: Witnessed seizure-like activity ([facility] [address] )  Active Problems:    Essential hypertension    CLL (chronic lymphocytic leukemia) ([facility] [address] )    Hypercholesterolemia    Acute cystitis with hematuria    Alcohol use    Past Medical History  Past Medical History:   Diagnosis Date    Anxiety     BPH (benign prostatic hypertrophy)     Cancer (HCC)     CLL    CLL (chronic lymphocytic leukemia) ([facility] [address] )     2009    Colon polyp     Concussion     Resolved: 08/22/16    Depression     Diverticulitis 1/13/2020 2014 CT done 11/19 12/19 CT done     E coli bacteremia 6/27/2021 2/2 blood cultures with Rolando Jay appears to be the urine     Epididymitis 5/19/2021 5/2021    Gastrointestinal hemorrhage     Last assessed: 08/27/13    GERD (gastroesophageal reflux disease)     Hearing loss of aging     Hiatal hernia     History of transfusion     Hyperlipidemia     Hypertension     Iron deficiency anemia     Microscopic hematuria     Last assessed: 06/28/13    OA (osteoarthritis)     right hip    Pneumothorax     Primary osteoarthritis of right hip 9/29/2017    He is status post right hip arthroplasty    Prostatitis     Pulmonary hypertension (HCC)     Seasonal allergies     Urinary tract infection associated with catheterization of urinary tract ([facility] [address] ) 2/5/2021    Pseudomonal UTI asymptomatic  Per Urology do not treat at this time  Urinary tract infection associated with catheterization of urinary tract ([facility] ) 2/5/2021    Pseudomonal UTI asymptomatic  Per Urology do not treat at this time    He did have urosepsis from E coli 7/21    UTI (urinary tract infection)     in past    Wears glasses      Past Surgical History  Past Surgical History:   Procedure Laterality Date    COLONOSCOPY      CYSTOSCOPY  10/09/2014    Diagnostic    CYSTOSCOPY  06/29/2020    FL CYSTOGRAM  8/15/2022    FRACTURE SURGERY      left lower arm    FRACTURE SURGERY      left femur    HERNIA REPAIR      JOINT REPLACEMENT Right     hip    OTHER SURGICAL HISTORY  03/2011    Spinal anesthesia epidural    CA TOTAL HIP ARTHROPLASTY Right 9/29/2017    Procedure: ARTHROPLASTY HIP TOTAL ANTERIOR;  Surgeon: Yana Lopez MD;  Location: [facility];  Service: Orthopedics    TONSILLECTOMY AND ADENOIDECTOMY      TRANSURETHRAL RESECTION OF PROSTATE      x 2    WRIST SURGERY             09/28/22 0950   OT Last Visit   OT Visit Date 09/28/22   Note Type   Note type Evaluation   Restrictions/Precautions   Weight Bearing Precautions Per Order No   Other Precautions Multiple lines; Fall Risk;Seizure   Pain Assessment   Pain Assessment Tool 0-10   Pain Score No Pain   Home Living   Type of [address] One level;Stairs to enter with rails   Bathroom Shower/Tub Tub/shower unit   Bathroom Toilet Standard   Bathroom Equipment Grab bars in shower   [address] [place]   Prior Function   Level of [place] Independent with ADLs and functional mobility   Lives With Spouse   Receives Help From Family   ADL Assistance Independent   IADLs Needs assistance   Falls in the last 6 months 0   Vocational Retired   Comments Pt lives with wife in a single level apt  was I with all ADls PTA, wife and pt share IADLs   Lifestyle   Autonomy Pt lives with wife in a single level apt  was I with all ADls PTA, wife and pt share IADLs   Reciprocal Relationships wife   Service to Others retired    [employer] [address]) [address]   Patient [address] " I can walk and do everything for my self, I don't need you "   ADL   Where Assessed Edge of bed   Eating Assistance 7  Independent   Grooming Assistance 7  Independent   UB Bathing Assistance 7  Independent   LB Bathing Assistance 6  [address] Dressing Assistance 6  Modified independent   Toileting Assistance  7  Independent   Bed Mobility   Rolling R 7  Independent   Rolling L 7  Independent   Supine to Sit 6  Modified independent   Sit to Supine 6  Modified independent   Transfers   Sit to Stand 6  Modified independent   Stand to Sit 6  Modified independent   Balance   Static Sitting Normal   Dynamic Sitting Good   Static Standing Fair +   Dynamic Standing Fair   Ambulatory Fair   Activity Tolerance   Activity Tolerance Patient limited by fatigue   Medical Staff Made Aware Marysol PT   Nurse Made Aware Elidia GARCIA   RUE Assessment   RUE Assessment WFL   LUE Assessment   LUE Assessment WFL   Hand Function   Gross Motor Coordination Functional   Fine Motor Coordination Functional   Vision-Basic Assessment   Current Vision Wears glasses all the time   Vision - Complex Assessment   Acuity Able to read employee name badge without difficulty   Cognition   Overall Cognitive Status WFL   Orientation Level Oriented X4   Memory Within functional limits   Following Commands Follows one step commands without difficulty   Assessment   Prognosis Good   Assessment Pt is a 81year old male admitted to [facility] on [facility]/27/22 with a witnesses sezure like activity  Pt with PMH significant for BPH, CLL, concussion, depression  Pt with active OT orders and activity orders for up and OOB  Pt lives with wife in a single level apt  Walk in  shower with chair, standard toilet  Ambulates with no device but owns a RW  Pt was I with ADLS prior to admission  Shares IADLs with wife  Pt also I with self cath daily  Pt evaluated by skilled OT and is currently functioning at functional baseline demonstrating Modified Independent for mobility without device, transfers, and ADLs  Skilled OT not warranted at this time  recommend D/C home with [facility] PT/OT when medically cleared     Plan   OT Frequency Eval only   Recommendation   OT Discharge Recommendation Home with home health rehabilitation   Additional Comments  The patient's raw score on the AM-PAC Daily Activity inpatient short form is 24  , standardized score is 57 54  , greater than 39 4  Patients at this level are likely to benefit from discharge to home  Please refer to the recommendation of the Occupational Therapist for safe discharge planning     AM-PAC Daily Activity Inpatient   Lower Body Dressing 4   Bathing 4   Toileting 4   Upper Body Dressing 4   Grooming 4   Eating 4   Daily Activity Raw Score 24   Daily Activity Standardized Score (Calc for Raw Score >=11) 57 54   AM-[facility] Applied Cognition Inpatient   Following a Speech/Presentation 4   Understanding Ordinary Conversation 4   Taking Medications 4   Remembering Where Things Are Placed or Put Away 4   Remembering List of 4-5 Errands 4   Taking Care of Complicated Tasks 4   Applied Cognition Raw Score 24   Applied Cognition Standardized Score 62 21     Clayton Delgado, OT

## 2022-09-28 NOTE — ASSESSMENT & PLAN NOTE
Requires self catheterization  Reports dark, now foul smelling urine for several days  Not currently on antibiotic suppression  UA with +LE, innumerable bacteria and WBC     Continue IV ceftriaxone

## 2022-09-28 NOTE — PROGRESS NOTES
Anitha 50  Progress Note Melinda José Manuel 1950, 81 y o  male MRN: [id number]  Unit/Bed#: E4 -01 Encounter: [id number]  Primary Care Provider: Nevin Rocha MD   Date and time admitted to hospital: 9/27/2022  4:24 PM    * Witnessed seizure-like activity [facility])  Assessment & Plan  Reports 3-5 minute episode of loss of consciousness with shaking of upper extremities and drooling  No urinary or bowel incontinence, no confusion immediately following event, did not fall or hit head  Reports additional 30 second episode same as above on EMS arrival    · He does have chronically elevated WBC in s/p CLL, but also notes fever, chills, foul smelling and dark urine with self catheterizaion  No hx of seizure activity, does drink about 6 glasses of beer daily with occasional shot of liquor, but has not had drink since Sunday 9/25/2022  No hx of EtOH withdrawal, no seizure previously  · Neurology consulted  · CT head negative, CXR relatively unchanged from prior, UA with +LE, innumerable WBC and bacteria  Lactate 1 3    · Etiology of seizure include possible infection vs EtoH withdrawal vs vasovagal    · Routine EEG  · Continue IV ceftriaxone for UTI  · Thiamine, folate, MVI for EToH  · CIWA protocol  · Seizure precautions    CLL (chronic lymphocytic leukemia) (HCC)  Assessment & Plan  · WBC 18 on admission, leukocytosis in setting of CLL, previously as high as 44  · Reports symptoms of infection as described above  · Workup and treatment of UTI as above  Acute cystitis with hematuria  Assessment & Plan  Requires self catheterization  Reports dark, now foul smelling urine for several days  Not on antibiotic suppression  UA with +LE, innumerable bacteria and WBC  · Continue IV ceftriaxone day 2  · Urine culture pending    Alcohol use  Assessment & Plan  Reports up to 6 beer daily, occasional shot of liquor  Last drink was 9/25/2022  No hx of withdrawal or seizure  · CIWA protocol  · Thiamine, folate, MVI    Hypercholesterolemia  Assessment & Plan  · Continue atorvastatin    Essential hypertension  Assessment & Plan  · Continue amlodipine, metoprolol      VTE Pharmacologic Prophylaxis: VTE Score: 6 High Risk (Score >/= 5) - Pharmacological DVT Prophylaxis Ordered: enoxaparin (Lovenox)  Sequential Compression Devices Ordered  Patient Centered Rounds: I performed bedside rounds with nursing staff today  Discussions with Specialists or Other Care Team Provider: neuro    Education and Discussions with Family / Patient: Updated  (wife) via phone  Time Spent for Care: 30 minutes  More than 50% of total time spent on counseling and coordination of care as described above  Current Length of Stay: 0 day(s)  Current Patient Status: Observation   Certification Statement: The patient will continue to require additional inpatient hospital stay due to pending neuro eval, eeg, mri brain  Discharge Plan: Anticipate discharge later today or tomorrow to home  Code Status: Level 1 - Full Code    Subjective:   No acute complaints    Objective:     Vitals:   Temp (24hrs), Av 6 °F (37 °C), Min:97 7 °F (36 5 °C), Max:99 5 °F (37 5 °C)    Temp:  [97 7 °F (36 5 °C)-99 5 °F (37 5 °C)] 97 7 °F (36 5 °C)  HR:  [] 71  Resp:  [20-28] 20  BP: (123-153)/(68-91) 143/82  SpO2:  [94 %-100 %] 94 %  Body mass index is 29 kg/m²  Input and Output Summary (last 24 hours): Intake/Output Summary (Last 24 hours) at 2022 0743  Last data filed at 2022 0000  Gross per 24 hour   Intake 1000 ml   Output 700 ml   Net 300 ml       Physical Exam:   Physical Exam  Vitals and nursing note reviewed  Constitutional:       Appearance: He is obese  HENT:      Head: Normocephalic and atraumatic  Nose: Nose normal       Mouth/Throat:      Mouth: Mucous membranes are moist    Eyes:      Extraocular Movements: Extraocular movements intact        Conjunctiva/sclera: Conjunctivae normal    Cardiovascular:      Rate and Rhythm: Normal rate  Rhythm irregular  Pulses: Normal pulses  Heart sounds: Normal heart sounds  Pulmonary:      Effort: Pulmonary effort is normal       Breath sounds: Normal breath sounds  Abdominal:      General: Bowel sounds are normal       Palpations: Abdomen is soft  Hernia: A hernia is present  Musculoskeletal:         General: No swelling or tenderness  Normal range of motion  Cervical back: Normal range of motion  Skin:     General: Skin is dry  Capillary Refill: Capillary refill takes less than 2 seconds  Neurological:      General: No focal deficit present  Mental Status: He is alert  Cranial Nerves: Cranial nerves are intact  Sensory: Sensation is intact  Motor: Tremor present  No pronator drift  Coordination: Coordination is intact     Psychiatric:         Mood and Affect: Mood normal          Behavior: Behavior normal           Additional Data:     Labs:  Results from last 7 days   Lab Units 09/28/22  0532 09/27/22  1709   WBC Thousand/uL 13 07* 18 73*   HEMOGLOBIN g/dL 10 5* 11 6*   HEMATOCRIT % 31 8* 34 8*   PLATELETS Thousands/uL 143* 169   BANDS PCT %  --  5   LYMPHO PCT %  --  78*   MONO PCT %  --  1*   EOS PCT %  --  0     Results from last 7 days   Lab Units 09/27/22  1705   SODIUM mmol/L 132*   POTASSIUM mmol/L 3 7   CHLORIDE mmol/L 95*   CO2 mmol/L 26   BUN mg/dL 10   CREATININE mg/dL 0 92   ANION GAP mmol/L 11   CALCIUM mg/dL 8 9   GLUCOSE RANDOM mg/dL 135                 Results from last 7 days   Lab Units 09/28/22  0532 09/27/22  1730 09/27/22  1705   LACTIC ACID mmol/L 1 2 1 3  --    PROCALCITONIN ng/ml 0 59*  --  0 13       Lines/Drains:  Invasive Devices  Report    Peripheral Intravenous Line  Duration           Peripheral IV 09/27/22 Left;Dorsal (posterior) Hand 1 day                  Telemetry:  Telemetry Orders (From admission, onward)             48 Hour Telemetry Monitoring  Continuous x 48 hours        References:    Telemetry Guidelines   Question:  Reason for 48 Hour Telemetry  Answer:  Arrhythmias Requiring Medical Therapy (eg  SVT, Vtach/fib, Bradycardia, Uncontrolled A-fib)                 Telemetry Reviewed: Normal Sinus Rhythm with PVCs  Indication for Continued Telemetry Use: No indication for continued use  Will discontinue  Imaging: No pertinent imaging reviewed  Recent Cultures (last 7 days):   Results from last 7 days   Lab Units 09/27/22  1730 09/27/22  1715   BLOOD CULTURE  Received in Microbiology Lab  Culture in Progress  Received in Microbiology Lab  Culture in Progress         Last 24 Hours Medication List:   Current Facility-Administered Medications   Medication Dose Route Frequency Provider Last Rate    acetaminophen  650 mg Oral Q6H PRN Gabe Castillo MD      amLODIPine  5 mg Oral QPM Gabe Castillo MD      aspirin  81 mg Oral Daily Gabe Castillo MD      atorvastatin  20 mg Oral Daily With MD Sara      cefTRIAXone  1,000 mg Intravenous Q24H Gabe Castillo MD      vitamin B-12  500 mcg Oral Daily Gabe Castillo MD      enoxaparin  40 mg Subcutaneous Daily Gabe Castillo MD      fish oil  1,000 mg Oral Daily Gabe Castillo MD      folic acid  1 mg Oral Daily Gabe Castillo MD      LORazepam  0 5 mg Oral Q8H PRN Gabe Castillo MD      metoprolol tartrate  25 mg Oral Q12H Gabe Castillo MD      montelukast  10 mg Oral Daily Gabe Castillo MD      multivitamin-minerals  1 tablet Oral Daily Gabe Castillo MD      pantoprazole  40 mg Oral Early Morning Gabe Castillo MD      polyethylene glycol  17 g Oral Daily PRN Gabe Castillo MD      sertraline  50 mg Oral Daily aGbe Castillo MD          Today, Patient Was Seen By: Dorothea Carmona PA-C    **Please Note: This note may have been constructed using a voice recognition system  **

## 2022-09-28 NOTE — PLAN OF CARE
Problem: Potential for Falls  Goal: Patient will remain free of falls  Description: INTERVENTIONS:  - Educate patient/family on patient safety including physical limitations  - Instruct patient to call for assistance with activity   - Consult OT/PT to assist with strengthening/mobility   - Keep Call bell within reach  - Keep bed low and locked with side rails adjusted as appropriate  - Keep care items and personal belongings within reach  - Initiate and maintain comfort rounds  - Make Fall Risk Sign visible to staff  - Offer Toileting every 2 Hours, in advance of need  - Initiate/Maintain bed alarm  - Apply yellow socks and bracelet for high fall risk patients  - Consider moving patient to room near nurses station  Outcome: Progressing     Problem: MOBILITY - ADULT  Goal: Maintain or return to baseline ADL function  Description: INTERVENTIONS:  -  Assess patient's ability to carry out ADLs; assess patient's baseline for ADL function and identify physical deficits which impact ability to perform ADLs (bathing, care of mouth/teeth, toileting, grooming, dressing, etc )  - Assess/evaluate cause of self-care deficits   - Assess range of motion  - Assess patient's mobility; develop plan if impaired  - Assess patient's need for assistive devices and provide as appropriate  - Encourage maximum independence but intervene and supervise when necessary  - Involve family in performance of ADLs  - Assess for home care needs following discharge   - Consider OT consult to assist with ADL evaluation and planning for discharge  - Provide patient education as appropriate  Outcome: Progressing  Goal: Maintains/Returns to pre admission functional level  Description: INTERVENTIONS:  - Perform BMAT or MOVE assessment daily    - Set and communicate daily mobility goal to care team and patient/family/caregiver  - Collaborate with rehabilitation services on mobility goals if consulted  - Perform Range of Motion 4 times a day    - Reposition patient every 4 hours    - Dangle patient 4 times a day  - Stand patient 4 times a day  - Ambulate patient 4 times a day  - Out of bed to chair 4 times a day   - Out of bed for meals 4 times a day  - Out of bed for toileting  - Record patient progress and toleration of activity level   Outcome: Progressing     Problem: NEUROSENSORY - ADULT  Goal: Remains free of injury related to seizures activity  Description: INTERVENTIONS  - Maintain airway, patient safety  and administer oxygen as ordered  - Monitor patient for seizure activity, document and report duration and description of seizure to physician/advanced practitioner  - If seizure occurs,  ensure patient safety during seizure  - Reorient patient post seizure  - Seizure pads on all 4 side rails  - Instruct patient/family to notify RN of any seizure activity including if an aura is experienced  - Instruct patient/family to call for assistance with activity based on nursing assessment  - Administer anti-seizure medications if ordered    Outcome: Progressing     Problem: INFECTION - ADULT  Goal: Absence or prevention of progression during hospitalization  Description: INTERVENTIONS:  - Assess and monitor for signs and symptoms of infection  - Monitor lab/diagnostic results  - Monitor all insertion sites, i e  indwelling lines, tubes, and drains  - Monitor endotracheal if appropriate and nasal secretions for changes in amount and color  - [place] appropriate cooling/warming therapies per order  - Administer medications as ordered  - Instruct and encourage patient and family to use good hand hygiene technique  - Identify and instruct in appropriate isolation precautions for identified infection/condition  Outcome: Progressing  Goal: Absence of fever/infection during neutropenic period  Description: INTERVENTIONS:  - Monitor WBC    Outcome: Progressing

## 2022-09-28 NOTE — PHYSICAL THERAPY NOTE
PT EVALUATION    81 y o     [id number]    Seizure ([address] ) [R56 9]  UTI (urinary tract infection) [N39 0]  Seizure-like activity ([address] ) [R56 9]    Past Medical History:   Diagnosis Date    Anxiety     BPH (benign prostatic hypertrophy)     Cancer (HCC)     CLL    CLL (chronic lymphocytic leukemia) ([facility] )     2009    Colon polyp     Concussion     Resolved: 08/22/16    Depression     Diverticulitis 1/13/2020 2014 CT done 11/19 12/19 CT done     E coli bacteremia 6/27/2021 2/2 blood cultures with Donnell Honeycutt appears to be the urine     Epididymitis 5/19/2021 5/2021    Gastrointestinal hemorrhage     Last assessed: 08/27/13    GERD (gastroesophageal reflux disease)     Hearing loss of aging     Hiatal hernia     History of transfusion     Hyperlipidemia     Hypertension     Iron deficiency anemia     Microscopic hematuria     Last assessed: 06/28/13    OA (osteoarthritis)     right hip    Pneumothorax     Primary osteoarthritis of right hip 9/29/2017    He is status post right hip arthroplasty    Prostatitis     Pulmonary hypertension (HCC)     Seasonal allergies     Urinary tract infection associated with catheterization of urinary tract ([facility] ) 2/5/2021    Pseudomonal UTI asymptomatic  Per Urology do not treat at this time  Urinary tract infection associated with catheterization of urinary tract ([facility] ) 2/5/2021    Pseudomonal UTI asymptomatic  Per Urology do not treat at this time    He did have urosepsis from E coli 7/21    UTI (urinary tract infection)     in past    Wears glasses          Past Surgical History:   Procedure Laterality Date    COLONOSCOPY      CYSTOSCOPY  10/09/2014    Diagnostic    CYSTOSCOPY  06/29/2020    FL CYSTOGRAM  8/15/2022    FRACTURE SURGERY      left lower arm    FRACTURE SURGERY      left femur    HERNIA REPAIR      JOINT REPLACEMENT Right     hip    OTHER SURGICAL HISTORY  03/2011    Spinal anesthesia epidural    VA TOTAL HIP ARTHROPLASTY Right 9/29/2017    Procedure: ARTHROPLASTY HIP TOTAL ANTERIOR;  Surgeon: Shaina Moreno MD;  Location: [facility];  Service: Orthopedics    TONSILLECTOMY AND ADENOIDECTOMY      TRANSURETHRAL RESECTION OF PROSTATE      x 2    WRIST SURGERY          09/28/22 1005   PT Last Visit   PT Visit Date 09/28/22   Note Type   Note type Evaluation   Pain Assessment   Pain Assessment Tool 0-10   Pain Score No Pain   Restrictions/Precautions   Weight Bearing Precautions Per Order No   Other Precautions Seizure   Home Living   Type of [address] One level;Stairs to enter with rails  (3-4 [address])   Bathroom Shower/Tub Tub/shower unit   Bathroom Toilet Standard   Bathroom Equipment Grab bars in shower   [address] Walker;Cane   Prior Function   Level of [place] Independent with ADLs and functional mobility   Lives With Spouse   Receives Help From Family   ADL Assistance Independent   IADLs Needs assistance   Falls in the last 6 months 0   Vocational Retired   General   Family/Caregiver Present No   Cognition   Overall Cognitive Status WFL   Arousal/Participation Alert   Orientation Level Oriented X4   Memory Within functional limits   Following Commands Follows all commands and directions without difficulty   Subjective   Subjective Pt wants to go home   RLE Assessment   RLE Assessment WFL   LLE Assessment   LLE Assessment WFL   Vision-Basic Assessment   Current Vision Wears glasses all the time  (except reading)   Bed Mobility   Supine to Sit 6  Modified independent   Sit to Supine 6  Modified independent   Transfers   Sit to Stand 6  Modified independent   Stand to Sit 6  Modified independent   Ambulation/Elevation   Gait pattern Wide HERNAN; Decreased foot clearance; Excessively slow; Short stride;Decreased toe off  (inc lateral sway; no LOB)   Gait Assistance 5  Supervision   Assistive Device None   Distance 150'   Balance   Static Sitting Normal   Dynamic Sitting Good   Static Standing Fair +   Dynamic Standing Fair   Ambulatory Fair   Endurance Deficit   Endurance Deficit Yes   Endurance Deficit Description SMITH, but SaO2 95% on RA after ambulation   Activity Tolerance   Activity Tolerance Patient limited by fatigue   Medical Staff Selina Singer OT   Nurse Made Aware yes, Paulette Trejo RN   Assessment   Assessment Pt is an 81 y o  male admitted to [address]  on 9/27/22 s/p witness seizure-like activity  PT consulted with eval and treat and activity as tolerated orders  Pt lives with wife in an apartment, 3[address]  At baseline, pt is independent with ADLs and ambulation without AD  Upon evaluation, pt is mod I for bed mobility and transfers  Pt is at a supervision level for ambulation, but reports being at baseline function and declines any PT services at this time  The patient's AM-PAC Basic Mobility Inpatient Short Form Raw Score is 22  A Raw score of greater than 16 suggests the patient may benefit from discharge to home  Please also refer to the recommendation of the Physical Therapist for safe discharge planning  Will sign off  Barriers to Discharge None   Goals   Patient Goals to go home   Recommendation   PT Discharge Recommendation No rehabilitation needs  (Pt declines need for PT services  Pt is probably at baseline function  )   AM-PAC Basic Mobility Inpatient   Turning in Bed Without Bedrails 4   Lying on Back to Sitting on Edge of Flat Bed 4   Moving Bed to Chair 4   Standing Up From Chair 4   Walk in Room 3   Climb 3-5 Stairs 3   Basic Mobility Inpatient Raw Score 22   Basic Mobility Standardized Score 47 4   Highest Level Of Mobility   -HLM Goal 7: Walk 25 feet or more   JH-HLM Achieved 7: Walk 25 feet or more   End of Consult   Patient Position at End of Consult Supine; All needs within reach     Complexity: High  History: advanced age, stairs in home environment, co-morbidities (HTN, R DANIEL)  Examination: impairments in locomotion, balance, knowledge of safety precautions, posture (forward head, flexed posture), endurance  Presentation: unstable/ unpredictable (ongoing medical management, monitor VS)    [facility] Ro[facility] Page

## 2022-09-28 NOTE — ASSESSMENT & PLAN NOTE
Reports 3-5 minute episode of loss of consciousness with shaking of upper extremities and drooling  No urinary or bowel incontinence, no confusion immediately following event, did not fall or hit head  Reports additional 30 second episode same as above on EMS arrival    · He does have chronically elevated WBC in s/p CLL, but also notes fever, chills, foul smelling and dark urine with self catheterization  No hx of seizure activity, does drink about 6 glasses of beer daily with occasional shot of liquor, but has not had drink since Sunday 9/25/2022  No hx of EtOH withdrawal, no seizure previously  · Neurology consulted; suspect provoked seizure no need for antiepileptic medications on dc  · CT head negative, CXR relatively unchanged from prior, UA with +LE, innumerable WBC and bacteria  Lactate 1 3    · Etiology of seizure include possible infection vs EtoH withdrawal vs vasovagal    · Routine EEG - without acute seizure activity  · Continue IV ceftriaxone for UTI - will dc with Keflex the UC from 9/15/2022 shows E coli UTI which he was never treated for  He does have h/o Klebsiella and pseudomonas in the urine in 2021  I will of course follow the UC from 9/27/2022 for final growth & sensitivities and call him to adjust as necessary    · Thiamine, folate, MVI for EToH  · CIWA protocol  · Seizure precautions

## 2022-09-28 NOTE — TELEPHONE ENCOUNTER
Pt under care of: Dr Robert Costa     Last Seen: 10/20/2021    Reason for call: Patient called in wanting a 2nd opinion from [address] [place] [address]  He currently is being seen at [facility] but was having issues with them     Patient was in ED from 9/27-9/28 for seizure like activity  Patient then proceed to say he was getting another call and had to call back, then hung up on me  When patient calls back in please ask him what he needs a 2nd opinion on and make an appointment with Dr Robert Costa for him

## 2022-09-28 NOTE — ASSESSMENT & PLAN NOTE
Reports 3-5 minute episode of loss of consciousness with shaking of upper extremities and drooling  No urinary or bowel incontinence, no confusion immediately following event, did not fall or hit head  Reports additional 30 second episode same as above on EMS arrival    · He does have chronically elevated WBC in s/p CLL, but also notes fever, chills, foul smelling and dark urine with self catheterizaion  No hx of seizure activity, does drink about 6 glasses of beer daily with occasional shot of liquor, but has not had drink since Sunday 9/25/2022  No hx of EtOH withdrawal, no seizure previously  · Neurology consulted  · CT head negative, CXR relatively unchanged from prior, UA with +LE, innumerable WBC and bacteria   Lactate 1 3    · Etiology of seizure include possible infection vs EtoH withdrawal vs vasovagal    · Routine EEG  · Continue IV ceftriaxone for UTI  · Thiamine, folate, MVI for EToH  · CIWA protocol  · Seizure precautions

## 2022-09-28 NOTE — TELEPHONE ENCOUNTER
Pt called back that he would like to Set up an appt to See Dr Isac Turner to F/U because he is not happy with the care he is receiving at [facility]  When asked what the 2nd opinion was in reference too Pt stated " he can see all notes from [facility] and [facility] in [place]  I should have never left Dr Mark James care "    Pt is being D/C'd from hospital today and like a call back tomorrow to schedule an appt

## 2022-09-28 NOTE — ASSESSMENT & PLAN NOTE
56-year-old male with CLL, anxiety/depression, dyslipidemia, hypertension, BPH with daily self catheterization, alcohol use disorder, presents with loss of consciousness with seizure-like activity  Initial episode of loss of consciousness with seizure-like activity occurred after a significant coughing fit on 09/27/2022  Question whether this episode represents cough syncope (convulsive syncope) versus provoked seizure  CT head on presentation demonstrated no acute changes  Patient had leukocytosis and UA consistent with UTI on presentation  Of note, patient does have leukocytosis chronically related to his CLL  Patient admits he stopped drinking alcohol abruptly on 09/25/2022 due to flu-like symptoms  Abrupt alcohol cessation combined with possible infection could certainly lower seizure threshold, provoking seizure  Plan:  -check MRI brain with/without contrast, seizure protocol  Patient states he would require premedication to attempt this as he is severely claustrophobic  He has required outpatient [employer] MRI in the past - would be reasonable to obtain study as outpatient   -check routine EEG  Would not initiate AED for now - if episodes were seizures, they were likely provoked  -recommend checking UDS  -correction of infectious/metabolic derangements as per primary service   -seizure precautions  -CIWA protocol as noted below   -Ativan 2 mg IV p r n  convulsive seizure-like activity lasting greater than 1 minute   -[employer] form for loss of consciousness filled out and faxed   -no further inpatient neurologic recommendation as long as EEG nonepileptogenic appearing

## 2022-09-28 NOTE — H&P
[facility] Naresh 1950, 81 y o  male MRN: [id number]  Unit/Bed#: ED 19 Encounter: [id number]  Primary Care Provider: Parul Santizo MD   Date and time admitted to hospital: 9/27/2022  4:24 PM    * Witnessed seizure-like activity [facility])  Assessment & Plan  Reports 3-5 minute episode of loss of consciousness with shaking of upper extremities and drooling  No urinary or bowel incontinence, no confusion immediately following event, did not fall or hit head  Reports additional 30 second episode same as above on EMS arrival    He does have chronically elevated WBC in s/p CLL, but also notes fever, chills, foul smelling and dark urine with self catheterizaion  No hx of seizure activity, does drink about 6 glasses of beer daily with occasional shot of liquor, but has not had drink since Sunday (about 48 hours ago)  No hx of EtOH withdrawal, seizure previously  CT head negative, CXR relatively unchanged from prior, UA with +LE, innumerable WBC and bacteria  Lactate 1 3  Etiology include possible infection vs EtoH withdrawal vs vasovagal    · Infectious workup with urine cx and blood cx pending, obtain procal  · Obtain orthostatics  · Routine EEG  · Continue IV ceftriaxone  · Thiamine, folate, MVI  · CIWA protocol  · Seizure precautions    Acute cystitis with hematuria  Assessment & Plan  Requires self catheterization  Reports dark, now foul smelling urine for several days  Not currently on antibiotic suppression  UA with +LE, innumerable bacteria and WBC  · Continue IV ceftriaxone  · Urine culture pending    Alcohol use  Assessment & Plan  Reports up to 6 beer daily, occasional shot of liquor  Last drink about 2 days ago  No hx of withdrawal or seizure  · CIWA protocol  · Thiamine, folate, MVI    BPH with obstruction/lower urinary tract symptoms  Assessment & Plan  Requires self catheterization  Reports dark, now foul smelling urine for several days     Not currently on antibiotic suppression  UA with +LE, innumerable bacteria and WBC  Continue IV ceftriaxone    Hypercholesterolemia  Assessment & Plan  Continue atorvastatin    CLL (chronic lymphocytic leukemia) (HCC)  Assessment & Plan  WBC 18 on admission, leukocytosis in setting of CLL, previously as high as 44  Reports symptoms of infection as described above  Workup and treatment of UTI as above  Essential hypertension  Assessment & Plan  Continue amlodipine, metoprolol          VTE Prophylaxis: Enoxaparin (Lovenox)  / sequential compression device and foot pump applied   Code Status: Prior Level 1- Full code      Anticipated Length of Stay:  Patient will be admitted on an Observation basis with an anticipated length of stay of  Less than 2 midnights  Justification for Hospital Stay: Please see detailed plans noted above  Updated wife at bedside  Chief Complaint:     Seizure like activity    History of Present Illness:  Maximo Servin is a 81 y o  male who has past medical history significant for CLL, pulmonary hypertension, anxiety and urinary retention requiring self catheterization presenting after seizure like activity  Patient had 3-5 minutes of loss of consciousness, with shaking in upper extremities and drooling witnessed by wife  He had additional similar event for about 30 seconds after EMS arrival  No bowel or bladder incontinence, no fall or head trauma and denies confusion after awaking  He does not have history of seizures or prior seizure like activity  He otherwise reports several days of fever, chills, cough, and notes foul smelling and dark urine with self catheterization  Patient does also report daily EtoH use with up to 6 beer and occasional shot of liquor daily  His last drink was about 2 days ago  He denies hx of withdrawal or seizures in setting of EtOH use  In ED, initially tachycardic which improved after NS administration   Labs otherwise signifcant for UA consistent with UTI, WBC 18 (in setting of CLL)  CT Head negative for acute abnormality  Review of Systems:    Constitutional:  Reports fever, chills  Eyes:  Denies change in visual acuity   HENT:  Denies nasal congestion or sore throat   Respiratory: Reports cough, intermittent dyspnea  Cardiovascular:  Denies chest pain or edema   GI:  Denies abdominal pain or bloody stools  :  Self caths, reports dark, foul smelling urine  Musculoskeletal:  Denies back pain or joint pain   Integument:  Denies rash   Neurologic:  Report mild headache following episode as described above  Denies sensory changes   Endocrine:  Denies polyuria or polydipsia   Psychiatric:  Reports anxiety    Past Medical and Surgical History:   Past Medical History:   Diagnosis Date    Anxiety     BPH (benign prostatic hypertrophy)     Cancer (HCC)     CLL    CLL (chronic lymphocytic leukemia) ([facility] [address] )     2009    Colon polyp     Concussion     Resolved: 08/22/16    Depression     Diverticulitis 1/13/2020 2014 CT done 11/19 12/19 CT done     E coli bacteremia 6/27/2021 2/2 blood cultures with [id number] [facility] [address] appears to be the urine     Epididymitis 5/19/2021 5/2021    Gastrointestinal hemorrhage     Last assessed: 08/27/13    GERD (gastroesophageal reflux disease)     Hearing loss of aging     Hiatal hernia     History of transfusion     Hyperlipidemia     Hypertension     Iron deficiency anemia     Microscopic hematuria     Last assessed: 06/28/13    OA (osteoarthritis)     right hip    Pneumothorax     Primary osteoarthritis of right hip 9/29/2017    He is status post right hip arthroplasty    Prostatitis     Pulmonary hypertension ([facility] [address] )     Seasonal allergies     Urinary tract infection associated with catheterization of urinary tract ([facility] [address] ) 2/5/2021    Pseudomonal UTI asymptomatic  Per Urology do not treat at this time      Urinary tract infection associated with catheterization of urinary tract ([address] ) 2/5/2021    Pseudomonal UTI asymptomatic  Per Urology do not treat at this time  He did have urosepsis from E coli 7/21    UTI (urinary tract infection)     in past    Wears glasses      Past Surgical History:   Procedure Laterality Date    COLONOSCOPY      CYSTOSCOPY  10/09/2014    Diagnostic    CYSTOSCOPY  06/29/2020    FL CYSTOGRAM  8/15/2022    FRACTURE SURGERY      left lower arm    FRACTURE SURGERY      left femur    HERNIA REPAIR      JOINT REPLACEMENT Right     hip    OTHER SURGICAL HISTORY  03/2011    Spinal anesthesia epidural    PA TOTAL HIP ARTHROPLASTY Right 9/29/2017    Procedure: ARTHROPLASTY HIP TOTAL ANTERIOR;  Surgeon: Brianda Ramírez MD;  Location: [facility];  Service: Orthopedics    TONSILLECTOMY AND ADENOIDECTOMY      TRANSURETHRAL RESECTION OF PROSTATE      x 2    WRIST SURGERY         Meds/Allergies:  No current facility-administered medications on file prior to encounter  Current Outpatient Medications on File Prior to Encounter   Medication Sig Dispense Refill    amLODIPine (NORVASC) 5 mg tablet Take 1 tablet (5 mg total) by mouth every evening 90 tablet 3    aspirin 81 MG tablet Take 1 tablet by mouth daily      atorvastatin (LIPITOR) 20 mg tablet TAKE ONE TABLET BY MOUTH ONCE EVERY DAY 90 tablet 3    Blood Pressure Monitoring (Omron 5 Series BP Monitor) PEE       cetirizine (ZyrTEC) 10 mg tablet Take 10 mg by mouth as needed        LORazepam (Ativan) 0 5 mg tablet Take 1 tablet (0 5 mg total) by mouth every 8 (eight) hours as needed for anxiety 30 tablet 0    metoprolol tartrate (LOPRESSOR) 25 mg tablet TAKE ONE TABLET BY MOUTH EVERY TWELVE HOURS 180 tablet 3    montelukast (SINGULAIR) 10 mg tablet TAKE ONE TABLET BY MOUTH DAILY 90 tablet 3    Omega-3 Fatty Acids (FISH OIL) 1,000 mg Take by mouth daily      omeprazole (PriLOSEC) 40 MG capsule Take one capsule by mouth once every day   90 capsule 3    sertraline (ZOLOFT) 50 mg tablet TAKE ONE TABLET BY MOUTH EVERY DAY 90 tablet 3    vitamin B-12 (CYANOCOBALAMIN) 500 MCG TABS Take 1 tablet (500 mcg total) by mouth daily 30 tablet 0    [DISCONTINUED] folic acid (FOLVITE) 770 mcg tablet Take 1 tablet (400 mcg total) by mouth daily 30 tablet 0         Allergies: Allergies   Allergen Reactions    Codeine Other (See Comments)     agitated    Sulfamethoxazole-Trimethoprim GI Intolerance and Abdominal Pain     upset stomach       History:  Marital Status: /Civil Union     Substance Use History:   Social History     Substance and Sexual Activity   Alcohol Use Yes    Alcohol/week: 10 0 standard drinks    Types: 10 Cans of beer per week     Social History     Tobacco Use   Smoking Status Former Smoker    Packs/day: 1 00    Years: 15 00    Pack years: 15 00    Types: Cigarettes    Start date:     Quit date: 1980    Years since quittin 0   Smokeless Tobacco Never Used     Social History     Substance and Sexual Activity   Drug Use Not Currently    Types: Marijuana       Family History:  Family History   Problem Relation Age of Onset    No Known Problems Mother     Heart attack Father     Diabetes Brother     Prostate cancer Brother        Physical Exam:     Vitals:   Blood Pressure: 141/83 (22)  Pulse: 64 (22)  Temperature: 99 5 °F (37 5 °C) (22 1634)  Temp Source: Oral (22 1634)  Respirations: (!) 28 (22)  SpO2: 96 % (22)    Constitutional:  Non-toxic appearance, NAD  Eyes:  Normocephalic and atraumatic, PERRL, EOMI, No scleral icterus   HENT:   oropharynx moist, external ears normal, external nose normal   Respiratory:  No respiratory distress, no wheezing   Cardiovascular:  Normal rate, no murmurs   GI:  Soft, nondistended, no guarding    Musculoskeletal:  no tenderness, no deformities     Integument:  no jaundice, no rash   Neurologic:  Alert &awake, communicative, CN 2-12 normal,  no focal deficits noted   Psychiatric:  Speech and behavior appropriate       Lab Results: I have personally reviewed pertinent reports  Results from last 7 days   Lab Units 09/27/22  1709   WBC Thousand/uL 18 73*   HEMOGLOBIN g/dL 11 6*   HEMATOCRIT % 34 8*   PLATELETS Thousands/uL 169   LYMPHO PCT % 78*   MONO PCT % 1*   EOS PCT % 0     Results from last 7 days   Lab Units 09/27/22  1705   POTASSIUM mmol/L 3 7   CHLORIDE mmol/L 95*   CO2 mmol/L 26   BUN mg/dL 10   CREATININE mg/dL 0 92   CALCIUM mg/dL 8 9             Imaging: I have personally reviewed pertinent reports  and I have personally reviewed pertinent films in PACS    CT head without contrast    Result Date: 9/27/2022  Narrative: CT BRAIN - WITHOUT CONTRAST INDICATION: Seizure  COMPARISON:  3/26/2015  TECHNIQUE:  CT examination of the brain was performed  In addition to axial images, sagittal and coronal 2D reformatted images were created and submitted for interpretation  Radiation dose length product (DLP) for this visit:  845 mGy-cm   This examination, like all CT scans performed in the [facility], was performed utilizing techniques to minimize radiation dose exposure, including the use of iterative reconstruction and automated exposure control  IMAGE QUALITY:  Diagnostic  FINDINGS: PARENCHYMA:  Periventricular and subcortical hypoattenuating foci consistent with microangiopathic disease  No acute intracranial hemorrhage or mass effect  VENTRICLES AND EXTRA-AXIAL SPACES:  No hydrocephalus or extra-axial collection  VISUALIZED ORBITS AND PARANASAL SINUSES:  Intact globes and orbits  Clear paranasal sinuses  CALVARIUM AND EXTRACRANIAL SOFT TISSUES:  No lytic or blastic lesion or fracture  Impression: No acute intracranial abnormality  Workstation performed: [id number]       Total time for visit, including counseling/coordination of care: 45 minutes  Greater than 50% of this total time spent on direct patient counseling and coorination of care       Epic Records Reviewed as well as Records in [facility]    ** Please Note: Dragon 360 Dictation voice to text software was used in the creation of this document   **

## 2022-09-28 NOTE — ASSESSMENT & PLAN NOTE
Cardiology Note          HPI   Rajwinder Wynne is a 57 y.o. female presents for a new patient visit.  She was last seen in the office in January of 2018.  She reports her appointment today is for a cardiac check up.    She walks several miles a day with no symptoms however has been limited over the past several months due to vertigo.  She has had ongoing C-s-spine issues.    She walked 2 miles this morning, up hills/inclines, with no symptoms.    She has a history of cavernoma in right brain.  She follows with neurology and Dr. Trujillo in neurosurgery.  She denies chest distress, BRENNER palpitations, PND/orthopnea and pre-syncope/syncope.        Past Medical History:   Diagnosis Date   • Allergic rhinitis    • Cerebral cavernoma     parietal cavernoma   • Cervical myelopathy (CMS/HCC)    • Cervical radiculopathy    • Cervicogenic headache    • Closed fracture of sesamoid bone of foot    • Cricopharyngeal dysphagia    • Fibroids    • Food allergy    • GERD (gastroesophageal reflux disease)    • Lipid disorder    • Nephrolithiasis    • Osteoporosis      Past Surgical History:   Procedure Laterality Date   • ANTERIOR FUSION CERVICAL SPINE      c6-7   • BUNIONECTOMY     • CRICOPHARYNGEAL MYOTOMY     • KNEE SURGERY     • LUMBAR FUSION      L5-S1   • MYOMECTOMY     • THROAT SURGERY     • TIBIAL SESAMOID EXCISION       Social History     Socioeconomic History   • Marital status:      Spouse name: Not on file   • Number of children: Not on file   • Years of education: Not on file   • Highest education level: Not on file   Occupational History   • Not on file   Social Needs   • Financial resource strain: Not on file   • Food insecurity     Worry: Not on file     Inability: Not on file   • Transportation needs     Medical: Not on file     Non-medical: Not on file   Tobacco Use   • Smoking status: Never Smoker   • Smokeless tobacco: Never Used   Substance and Sexual Activity   • Alcohol use: No   • Drug use: No   •  WBC 18 on admission, leukocytosis in setting of CLL, previously as high as 44  Reports symptoms of infection as described above  Workup and treatment of UTI as above  Sexual activity: Not on file   Lifestyle   • Physical activity     Days per week: Not on file     Minutes per session: Not on file   • Stress: Not on file   Relationships   • Social connections     Talks on phone: Not on file     Gets together: Not on file     Attends Jainism service: Not on file     Active member of club or organization: Not on file     Attends meetings of clubs or organizations: Not on file     Relationship status: Not on file   • Intimate partner violence     Fear of current or ex partner: Not on file     Emotionally abused: Not on file     Physically abused: Not on file     Forced sexual activity: Not on file   Other Topics Concern   • Not on file   Social History Narrative   • Not on file     Family History   Problem Relation Age of Onset   • Hyperlipidemia Biological Mother    • Hypertension Biological Mother    • Pulmonary fibrosis Biological Father    • Heart attack Biological Father    • Colon cancer Maternal Grandfather    • Breast cancer Mother's Sister    • Ovarian cancer Father's Sister      Liriano flavor, Shellfish containing products, Adhesive, Xenia, Banana, Erythromycin, Erythromycin base, Iodine, Latex, Levonorgestrel-ethinyl estrad, Naprelan, Naproxen, Naproxen sodium, Penicillin g, Penicillin v potassium, Penicillins, Pineapple, Pumpkin seed, and Shellfish derived  Current Outpatient Medications   Medication Sig Dispense Refill   • b complex vitamins tablet Take 400 mg by mouth daily.     • cetirizine (ZyrTEC) 10 mg tablet Take 1 tablet by mouth daily.     • cholecalciferol, vitamin D3, (VITAMIN D3) 1,000 unit capsule take 1 tablet by oral route  every day     • denosumab (PROLIA) 60 mg/mL syringe Inject 60 mg under the skin once.     • fluticasone propionate (FLONASE) 50 mcg/actuation nasal spray SPRAY 2 SPRAYS INTO EACH NOSTRIL EVERY DAY 48 mL 1   • folic acid/multivit,iron, (CENTRUM ORAL) Take by mouth.     • gabapentin (NEURONTIN) 300 mg capsule Take 900 mg by mouth 3  (three) times a day.       • hydrochlorothiazide (HYDRODIURIL) 25 mg tablet TAKE 0.5 TABLETS (12.5 MG TOTAL) BY MOUTH DAILY. 45 tablet 1   • KLOR-CON M10 10 mEq CR tablet TAKE 1 TABLET BY MOUTH EVERY DAY 90 tablet 1   • meloxicam (MOBIC) 7.5 mg tablet Take 7.5 mg by mouth as needed.       • pantoprazole (PROTONIX) 40 mg EC tablet TAKE 1 TABLET BY MOUTH EVERY DAY (Patient taking differently: Take by mouth as needed.  ) 90 tablet 1   • rosuvastatin (CRESTOR) 5 mg tablet TAKE 1 TABLET BY MOUTH EVERY DAY 90 tablet 1     No current facility-administered medications for this visit.        Review of Systems   Constitution: Negative for diaphoresis, weight gain and weight loss.   Eyes: Negative for visual disturbance.   Cardiovascular: Negative for chest pain, claudication, dyspnea on exertion, leg swelling and palpitations.   Respiratory: Negative for cough, shortness of breath and snoring.    Skin: Negative for rash.   Musculoskeletal: Positive for arthritis and back pain. Negative for joint pain and muscle weakness.   Gastrointestinal: Positive for nausea. Negative for heartburn.   Neurological: Positive for dizziness.   Psychiatric/Behavioral: Negative for depression and memory loss.     Objective   Vitals:    04/05/21 1459   BP: 132/82   Pulse: 82   Resp: 14   Temp: 36.3 °C (97.3 °F)   SpO2: 99%   BP equal in both arms     Physical Exam   Constitutional: She is oriented to person, place, and time. She appears well-developed and well-nourished.   HENT:   Head: Normocephalic.   Eyes:   Fundoscopic exam:       The right eye shows no arteriolar narrowing and no AV nicking.        The left eye shows no arteriolar narrowing and no AV nicking.   Neck: No JVD present.   Cardiovascular: Normal rate and normal heart sounds.   Pulmonary/Chest: Breath sounds normal.   Abdominal: Soft. Bowel sounds are normal.   Musculoskeletal:         General: No edema.   Neurological: She is alert and oriented to person, place, and time.   Skin:  "No rash noted.   Psychiatric: Her behavior is normal.       Lab Results   Component Value Date    WBC 6.84 06/26/2020    HGB 13.8 06/26/2020     06/26/2020    CHOL 175 06/26/2020    TRIG 80 06/26/2020    HDL 73 06/26/2020    ALT 18 06/26/2020    AST 24 06/26/2020     06/26/2020    K 4.1 06/26/2020    CREATININE 0.7 06/26/2020    TSH 3.79 06/26/2020    INR 0.9 03/23/2019    HGBA1C 5.2 06/26/2020        ECG   Sinus, within normal limits          Wt Readings from Last 3 Encounters:   04/05/21 51.3 kg (113 lb)   03/31/21 50.8 kg (112 lb)   03/02/21 50.8 kg (112 lb)       Problem List Items Addressed This Visit        Unprioritized    Borderline hypertension - Primary     Her blood pressure will be elevated during times of emotional distress and high salt intake.    She reports her blood pressures have been stable.    She takes HCTZ for kidney stones.    Blood pressure mildly elevated at time of visit today. She admits to \"white coat syndrome\"  Given low salt handout.  She will monitor her b/p's and contact our office if registering >130's/80's on consistent basis.           Hyperlipidemia     , Trig 80, HDL 73 and LDL of 86 on 6/20 lipids.  She had been started on rosuvastatin 5 mg several months prior to labs.    She is to undergo fasting labs in the near future.    Coronary calcium score of 0 on 1/19 study.         Family history of atherosclerosis     Her father with a history of MI at age 79.    Coronary calcium score of 0 on 1/19 study.                  IMiriam CRNP, am scribing for, and in the presence of, Kevon Gaona MD.    4/5/2021 4:34 PM   I, Kevon Gaona MD,  personally performed the services described in this documentation as scribed by Miriam Girard in my presence, and it is both accurate and complete.    4/5/2021 4:35 PM    She will return in a couple years.   I spent 48 minutes on this date of service performing the following activities: obtaining history, performing " examination, entering orders, documenting, preparing for visit, obtaining / reviewing records, providing counseling and education, independently reviewing study/studies and communicating results.  Kevon Gaona MD  4/5/2021

## 2022-09-28 NOTE — PLAN OF CARE
Problem: Potential for Falls  Goal: Patient will remain free of falls  Description: INTERVENTIONS:  - Educate patient/family on patient safety including physical limitations  - Instruct patient to call for assistance with activity   - Consult OT/PT to assist with strengthening/mobility   - Keep Call bell within reach  - Keep bed low and locked with side rails adjusted as appropriate  - Keep care items and personal belongings within reach  - Initiate and maintain comfort rounds  - Make Fall Risk Sign visible to staff  - Offer Toileting every 2 Hours, in advance of need  - Initiate/Maintain bed alarm  - Obtain necessary fall risk management equipment:   - Apply yellow socks and bracelet for high fall risk patients  - Consider moving patient to room near nurses station  Outcome: Progressing     Problem: MOBILITY - ADULT  Goal: Maintain or return to baseline ADL function  Description: INTERVENTIONS:  -  Assess patient's ability to carry out ADLs; assess patient's baseline for ADL function and identify physical deficits which impact ability to perform ADLs (bathing, care of mouth/teeth, toileting, grooming, dressing, etc )  - Assess/evaluate cause of self-care deficits   - Assess range of motion  - Assess patient's mobility; develop plan if impaired  - Assess patient's need for assistive devices and provide as appropriate  - Encourage maximum independence but intervene and supervise when necessary  - Involve family in performance of ADLs  - Assess for home care needs following discharge   - Consider OT consult to assist with ADL evaluation and planning for discharge  - Provide patient education as appropriate  Outcome: Progressing     Problem: NEUROSENSORY - ADULT  Goal: Remains free of injury related to seizures activity  Description: INTERVENTIONS  - Maintain airway, patient safety  and administer oxygen as ordered  - Monitor patient for seizure activity, document and report duration and description of seizure to physician/advanced practitioner  - If seizure occurs,  ensure patient safety during seizure  - Reorient patient post seizure  - Seizure pads on all 4 side rails  - Instruct patient/family to notify RN of any seizure activity including if an aura is experienced  - Instruct patient/family to call for assistance with activity based on nursing assessment  - Administer anti-seizure medications if ordered  Outcome: Progressing     Problem: INFECTION - ADULT  Goal: Absence or prevention of progression during hospitalization  Description: INTERVENTIONS:  - Assess and monitor for signs and symptoms of infection  - Monitor lab/diagnostic results  - Monitor all insertion sites, i e  indwelling lines, tubes, and drains  - Monitor endotracheal if appropriate and nasal secretions for changes in amount and color  - [place] appropriate cooling/warming therapies per order  - Administer medications as ordered  - Instruct and encourage patient and family to use good hand hygiene technique  - Identify and instruct in appropriate isolation precautions for identified infection/condition  Outcome: Progressing  Goal: Absence of fever/infection during neutropenic period  Description: INTERVENTIONS:  - Monitor WBC  Outcome: Progressing

## 2022-09-28 NOTE — Clinical Note
Pt is a 81year old male admitted to [facility] on 9/27/22 with a witnesses sezure like activity  Pt with PMH significant for BPH, CLL, concussion, depression  Pt with active OT orders and activity orders for up and OOB  Pt lives with wife in a single level apt  Walk in  shower with chair, standard toilet  Ambulates with no device but owns a RW  Pt was I with ADLS prior to admission  Shares IADLs with wife  Pt also I with self cath daily  Pt evaluated by skilled OT and is currently functioning at functional baseline demonstrating Modified Independent for mobility without device, transfers, and ADLs  Skilled OT not warranted at this time  recommend D/C home with [place] PT/OT when medically cleared

## 2022-09-28 NOTE — ASSESSMENT & PLAN NOTE
Patient admits to drinking 6 beers in 1 hard liquor drink daily, but states he stopped drinking on 09/25/2022 due to flu-like symptoms  Denies prior history of withdrawal symptoms or seizure    States he does not currently feel symptoms consistent with withdrawal   -[facility] protocol  -thiamine/folate/MVI  -cessation counseling

## 2022-09-28 NOTE — DISCHARGE SUMMARY
[address] [facility]  Discharge- [facility] 1950, 81 y o  male MRN: [id number]  Unit/Bed#: E4 -01 Encounter: [id number]  Primary Care Provider: Chel Zepeda MD   Date and time admitted to hospital: 9/27/2022  4:24 PM    * Seizure-like activity [facility])  Assessment & Plan  Reports 3-5 minute episode of loss of consciousness with shaking of upper extremities and drooling  No urinary or bowel incontinence, no confusion immediately following event, did not fall or hit head  Reports additional 30 second episode same as above on EMS arrival    · He does have chronically elevated WBC in s/p CLL, but also notes fever, chills, foul smelling and dark urine with self catheterization  No hx of seizure activity, does drink about 6 glasses of beer daily with occasional shot of liquor, but has not had drink since Sunday 9/25/2022  No hx of EtOH withdrawal, no seizure previously  · Neurology consulted; suspect provoked seizure no need for antiepileptic medications on dc  · CT head negative, CXR relatively unchanged from prior, UA with +LE, innumerable WBC and bacteria  Lactate 1 3    · Etiology of seizure include possible infection vs EtoH withdrawal vs vasovagal    · Routine EEG - without acute seizure activity  · Continue IV ceftriaxone for UTI - will dc with Keflex the UC from 9/15/2022 shows E coli UTI which he was never treated for  He does have h/o Klebsiella and pseudomonas in the urine in 2021  I will of course follow the UC from 9/27/2022 for final growth & sensitivities and call him to adjust as necessary  · Thiamine, folate, MVI for EToH  · CIWA protocol  · Seizure precautions    CLL (chronic lymphocytic leukemia) (HCC)  Assessment & Plan  · WBC 18 on admission, leukocytosis in setting of CLL, previously as high as 44  · Reports symptoms of infection as described above  · Workup and treatment of UTI as above      Acute cystitis with hematuria  Assessment & Plan  Requires self catheterization  Reports dark, now foul smelling urine for several days  Not on antibiotic suppression  UA with +LE, innumerable bacteria and WBC  · Continue IV ceftriaxone day 2 - keflex for 7 days on dc  · Urine culture 9/27/2022 still pending, follow for final growth and sensitivities  Alcohol use  Assessment & Plan  Reports up to 6 beer daily, occasional shot of liquor  Last drink was 9/25/2022  No hx of withdrawal or seizure  · CIWA protocol  · Thiamine, folate, MVI    Hypercholesterolemia  Assessment & Plan  · Continue atorvastatin    Essential hypertension  Assessment & Plan  · Continue amlodipine, metoprolol      Medical Problems             Resolved Problems  Date Reviewed: 5/13/2022   None               Discharging Physician / Practitioner: Ely Ngo  PCP: Bernardino Rodriguez MD  Admission Date:   Admission Orders (From admission, onward)     Ordered        09/27/22 1958  Place in Observation  Once                      Discharge Date: 09/28/22    Consultations During Hospital Stay:  · neurology    Procedures Performed:   EEG - Interpretation: This is a normal 30 minutes awake and drowsy EEG  Significant Findings / Test Results:   CT head without contrast   Final Result by Maya Hood MD (09/27 1934)      No acute intracranial abnormality  Workstation performed: [id number]         XR chest 1 view portable   Final Result by Lionel Munoz MD (09/28 3080)      No acute cardiopulmonary disease        Stable exam            Workstation performed: [id number]         MRI brain seizure wo and w contrast    (Results Pending)     Results Reviewed     Procedure Component Value Units Date/Time    Procalcitonin [[id number]]  (Normal) Collected: 09/27/22 1705    Lab Status: Final result Specimen: Blood from Hand, Left Updated: 09/28/22 0146     Procalcitonin 0 13 ng/ml     Blood culture #1 [[id number]] Collected: 09/27/22 1715    Lab Status: Preliminary result Specimen: Blood from Arm, Right Updated: 09/28/22 0003     Blood Culture Received in Microbiology Lab  Culture in Progress  Blood culture #2 [[id number]] Collected: 09/27/22 1730    Lab Status: Preliminary result Specimen: Blood from Arm, Left Updated: 09/28/22 0003     Blood Culture Received in Microbiology Lab  Culture in Progress  HS Troponin I 4hr [[id number]]  (Normal) Collected: 09/27/22 2141    Lab Status: Final result Specimen: Blood from Arm, Left Updated: 09/27/22 2231     hs TnI 4hr 23 ng/L      Delta 4hr hsTnI 9 ng/L     COVID Rapid Antigen [[id number]]  (Normal) Collected: 09/27/22 2141    Lab Status: Final result Specimen: Nares from Nose Updated: 09/27/22 2226     SARS-CoV-2 Ag Negative Presumptive    Covid19 and INFLUENZA A/B PCR [[id number]] Collected: 09/27/22 2141    Lab Status:  In process Specimen: Nares from Nose Updated: 09/27/22 2226    HS Troponin I 2hr [[id number]]  (Normal) Collected: 09/27/22 1911    Lab Status: Final result Specimen: Blood from Hand, Left Updated: 09/27/22 1944     hs TnI 2hr 25 ng/L      Delta 2hr hsTnI 11 ng/L     Manual Differential(PHLEBS Do Not Order) [[id number]]  (Abnormal) Collected: 09/27/22 1709    Lab Status: Final result Specimen: Blood from Hand, Left Updated: 09/27/22 1919     Segmented % 16 %      Bands % 5 %      Lymphocytes % 78 %      Monocytes % 1 %      Eosinophils, % 0 %      Basophils % 0 %      Absolute Neutrophils 3 93 Thousand/uL      Lymphocytes Absolute 14 61 Thousand/uL      Monocytes Absolute 0 19 Thousand/uL      Eosinophils Absolute 0 00 Thousand/uL      Basophils Absolute 0 00 Thousand/uL      Total Counted --     Smudge Cells Present     Anisocytosis Present     Macrocytes Present     Platelet Estimate Adequate    CBC and differential [[id number]]  (Abnormal) Collected: 09/27/22 1709    Lab Status: Final result Specimen: Blood from Hand, Left Updated: 09/27/22 1816     WBC 18 73 Thousand/uL      RBC 3 36 Million/uL      Hemoglobin 11 6 g/dL Hematocrit 34 8 %       fL      MCH 34 5 pg      MCHC 33 3 g/dL      RDW 15 4 %      MPV 9 7 fL      Platelets 411 Thousands/uL     Narrative: This is an appended report  These results have been appended to a previously verified report  Lactic acid [[id number]]  (Normal) Collected: 09/27/22 1730    Lab Status: Final result Specimen: Blood from Arm, Left Updated: 09/27/22 1810     LACTIC ACID 1 3 mmol/L     Narrative:      Result may be elevated if tourniquet was used during collection  HS Troponin 0hr (reflex protocol) [[id number]]  (Normal) Collected: 09/27/22 1705    Lab Status: Final result Specimen: Blood from Hand, Left Updated: 09/27/22 1808     hs TnI 0hr 14 ng/L     Urine Microscopic [[id number]]  (Abnormal) Collected: 09/27/22 1743    Lab Status: Final result Specimen: Urine, Straight Cath Updated: 09/27/22 1803     RBC, UA 1-2 /hpf      WBC, UA Innumerable /hpf      Epithelial Cells Occasional /hpf      Bacteria, UA Innumerable /hpf      OTHER OBSERVATIONS Sperm Present    Urine culture [[id number]] Collected: 09/27/22 1743    Lab Status:  In process Specimen: Urine, Straight Cath Updated: 09/27/22 3999    Basic metabolic panel [[id number]]  (Abnormal) Collected: 09/27/22 1705    Lab Status: Final result Specimen: Blood from Hand, Left Updated: 09/27/22 1757     Sodium 132 mmol/L      Potassium 3 7 mmol/L      Chloride 95 mmol/L      CO2 26 mmol/L      ANION GAP 11 mmol/L      BUN 10 mg/dL      Creatinine 0 92 mg/dL      Glucose 135 mg/dL      Calcium 8 9 mg/dL      eGFR 83 ml/min/1 73sq m     Narrative:      [place] guidelines for Chronic Kidney Disease (CKD):     Stage 1 with normal or high GFR (GFR > 90 mL/min/1 73 square meters)    Stage 2 Mild CKD (GFR = 60-89 mL/min/1 73 square meters)    Stage 3A Moderate CKD (GFR = 45-59 mL/min/1 73 square meters)    Stage 3B Moderate CKD (GFR = 30-44 mL/min/1 73 square meters)    Stage 4 Severe CKD (GFR = 15-29 mL/min/1 73 square meters)    Stage 5 End Stage CKD (GFR <15 mL/min/1 73 square meters)  Note: GFR calculation is accurate only with a steady state creatinine    UA w Reflex to Microscopic w Reflex to Culture [[id number]]  (Abnormal) Collected: 09/27/22 1743    Lab Status: Final result Specimen: Urine, Straight Cath Updated: 09/27/22 1754     Color, UA Ana     Clarity, UA Cloudy     Specific Gravity, UA >=1 030     pH, UA 6 0     Leukocytes, UA Moderate     Nitrite, UA Negative     Protein, UA >=300 mg/dl      Glucose, UA Negative mg/dl      Ketones, UA Negative mg/dl      Urobilinogen, UA 1 0 E U /dl      Bilirubin, UA Negative     Occult Blood, UA Moderate    FLU/COVID - if FLU clinically relevant [[id number]] Collected: 09/27/22 1709    Lab Status: In process Specimen: Nares from Nasopharyngeal Swab Updated: 09/27/22 1741            Incidental Findings:   · None     Test Results Pending at Discharge (will require follow up):   · UC 9/27/2022 in process  · BC x 2 - in process     Outpatient Tests Requested:  · MRI brain OPEN MRI MACHINE    Complications:  None    Reason for Admission: seizure like activity    Hospital Course:   Ke Webb is a 81 y o  male patient with pmh alcohol use disorder, urinary retention from diverticular bladder requiring self catheterization and there are recurring UTI assc with this, allergies, Pulm HTN, ELI from GAVE, DDD, GERD, spinal stenosis of lumbar spine, HLD, HTN, CLL, h/o Vit D deficiency, Obesity who originally presented to the hospital on 9/27/2022 due to seizure like activity  Found to have UTI on admission treated with rocephin and IVF according to last UC which was obtained in the op setting on 9/15/2022 and grew E coli sensitive to cephalosporin  Also was treated on CIWA protocol for alcohol use and patient denies withdrawal symptoms  He had Ativan only 1 time for CIWA while IP   Patient was seen in consult by neurology and had EEG completed which was completely normal  They suspect this was a provoked seizure by the infection as well as the alcohol cessation  No need for antiepileptic medications on dc  They ordered MRI brain for [facility] in the op setting  Also needs neurology appt on follow up  Patient should follow up with his Urologist  Patient was monitored on telemetry overnight without events, he does have a history of NSVT in the past (during severe covid infection back in Jan 2021) which was investigated with cardiology consultation  He had recent echo June 2022 which showed new LVH  He should see cardiology again with this history, new changes in echocardiogram as well as now this loss of consciousness for which he was admitted  There was no VT on telemetry, no chest pain and Trops on admission were wnl so he doesn't need Cardiology as an IP but should see Dr Aubrey Stallings again which I will write on the discharge  Patient and his wife have had all of their questions answered and are amenable to dc to home today  He will get keflex 7 days on dc and I will adjust the antibiotics as necessary based on the UC drawn on 9/27/2022  Please see above list of diagnoses and related plan for additional information  Condition at Discharge: stable    Discharge Day Visit / Exam:   * Please refer to separate progress note for these details *    Discussion with Family: Updated  (wife) at bedside  Discharge instructions/Information to patient and family:   See after visit summary for information provided to patient and family  Provisions for Follow-Up Care:  See after visit summary for information related to follow-up care and any pertinent home health orders  Disposition:   Home    Planned Readmission: none     Discharge Statement:  I spent 45 minutes discharging the patient  This time was spent on the day of discharge  I had direct contact with the patient on the day of discharge   Greater than 50% of the total time was spent examining patient, answering all patient questions, arranging and discussing plan of care with patient as well as directly providing post-discharge instructions  Additional time then spent on discharge activities  Discharge Medications:  See after visit summary for reconciled discharge medications provided to patient and/or family        **Please Note: This note may have been constructed using a voice recognition system**

## 2022-09-29 ENCOUNTER — TRANSITIONAL CARE MANAGEMENT (OUTPATIENT)
Dept: FAMILY MEDICINE CLINIC | Facility: CLINIC | Age: 72
End: 2022-09-29

## 2022-09-29 LAB
BACTERIA UR CULT: ABNORMAL
FLUAV RNA RESP QL NAA+PROBE: NEGATIVE
FLUBV RNA RESP QL NAA+PROBE: NEGATIVE
SARS-COV-2 RNA RESP QL NAA+PROBE: NEGATIVE

## 2022-09-29 RX ORDER — PSEUDOEPHEDRINE HCL 30 MG
100 TABLET ORAL 2 TIMES DAILY
COMMUNITY
Start: 2022-08-04

## 2022-09-29 RX ORDER — AMOXICILLIN 500 MG/1
CAPSULE ORAL
COMMUNITY
Start: 2022-09-22

## 2022-09-29 RX ORDER — SENNA PLUS 8.6 MG/1
2 TABLET ORAL DAILY
COMMUNITY
Start: 2022-08-05

## 2022-09-29 RX ORDER — OXYCODONE HYDROCHLORIDE 5 MG/1
5 TABLET ORAL
COMMUNITY
Start: 2022-08-04 | End: 2022-09-30 | Stop reason: ALTCHOICE

## 2022-09-29 RX ORDER — PHENAZOPYRIDINE HYDROCHLORIDE 100 MG/1
100 TABLET, FILM COATED ORAL
COMMUNITY
Start: 2022-08-16

## 2022-09-29 NOTE — TELEPHONE ENCOUNTER
Call from patient expressing his dissatisfaction that he needs to wait until the end of November to see Dr Ismael Johnson and he would like a call back from the doctor or a nurse to explain why he is being scheduled out that far  Patient advised he was just discharged from the hospital with urinary problems and does not understand why his PCP was able to see him in a day but Dr Dottie Gomez is going to make him wait  Patient also expressed frustration with the phone system  Please call patient

## 2022-09-29 NOTE — TELEPHONE ENCOUNTER
Spoke with patient's spouse as patient was in the shower  Advised that first available is 11/29 at 11:30 and 3:30 in [address] with Dr Cherie Jacques

## 2022-09-30 ENCOUNTER — OFFICE VISIT (OUTPATIENT)
Dept: FAMILY MEDICINE CLINIC | Facility: CLINIC | Age: 72
End: 2022-09-30
Payer: MEDICARE

## 2022-09-30 VITALS
WEIGHT: 192.2 LBS | OXYGEN SATURATION: 98 % | HEIGHT: 68 IN | SYSTOLIC BLOOD PRESSURE: 130 MMHG | RESPIRATION RATE: 12 BRPM | HEART RATE: 85 BPM | BODY MASS INDEX: 29.13 KG/M2 | DIASTOLIC BLOOD PRESSURE: 84 MMHG

## 2022-09-30 DIAGNOSIS — D50.9 IRON DEFICIENCY ANEMIA, UNSPECIFIED IRON DEFICIENCY ANEMIA TYPE: Chronic | ICD-10-CM

## 2022-09-30 DIAGNOSIS — G47.33 OSA (OBSTRUCTIVE SLEEP APNEA): Chronic | ICD-10-CM

## 2022-09-30 DIAGNOSIS — N32.3 DIVERTICULUM OF BLADDER: ICD-10-CM

## 2022-09-30 DIAGNOSIS — R56.9 SEIZURE-LIKE ACTIVITY (HCC): Primary | ICD-10-CM

## 2022-09-30 DIAGNOSIS — I10 ESSENTIAL HYPERTENSION: Chronic | ICD-10-CM

## 2022-09-30 DIAGNOSIS — N30.01 ACUTE CYSTITIS WITH HEMATURIA: ICD-10-CM

## 2022-09-30 DIAGNOSIS — I27.20 PULMONARY HYPERTENSION (HCC): Chronic | ICD-10-CM

## 2022-09-30 DIAGNOSIS — K21.9 GASTROESOPHAGEAL REFLUX DISEASE WITHOUT ESOPHAGITIS: Chronic | ICD-10-CM

## 2022-09-30 DIAGNOSIS — E78.00 HYPERCHOLESTEROLEMIA: Chronic | ICD-10-CM

## 2022-09-30 PROCEDURE — 99496 TRANSJ CARE MGMT HIGH F2F 7D: CPT | Performed by: FAMILY MEDICINE

## 2022-09-30 NOTE — TELEPHONE ENCOUNTER
Spoke with patient  He was fine on the phone and is just unhappy with his care from [facility] and wishes he never left us  He was okay with 11/29 appointment  I did explain that he will need time for the doctor to thoroughly review his records and care plan from [facility] and he understood  He asked if he has issues, if he can call us for help in the meantime  Advised that he is still established with us so he can gladly call for any issues  He can also follow up with [facility] as well in the meantime until seen  Patient was appreciative for this  Also added to wait list in case of openings

## 2022-09-30 NOTE — PROGRESS NOTES
Assessment/Plan:       Problem List Items Addressed This Visit        Digestive    Esophageal reflux (Chronic)     Stable at this time  Respiratory    DIXIE not currently treated (Chronic)     He declines treatment  Cardiovascular and Mediastinum    Essential hypertension (Chronic)     Well controlled this time  Continue current regimen  Pulmonary hypertension (HCC) (Chronic)     Currently stable  Genitourinary    Diverticulum of bladder     Status post surgery at [facility]  He would like to continue care locally with our Urology team          Relevant Orders    Ambulatory referral to Urology    Acute cystitis with hematuria     He will finish up the cephalexin  Continue close follow-up with Urology  I will place a consult to continue care locally  Relevant Orders    Ambulatory referral to Urology       Other    Iron deficiency anemia (Chronic)    Hypercholesterolemia (Chronic)    Seizure-like activity ([address] ) - Primary     I did reach out to Neurology  They did submit a form to [place] [employer] for loss of consciousness  I would suggest he follows with Neurology  It is certainly reassuring that his EEG was negative  He also will be setting up an MRI at [facility] in the near future  I gave him a prescription for this today  Relevant Orders    MRI brain w wo contrast            Subjective:      Patient ID: Vania Grajeda is a 81 y o  male      HPI     TCM Call     Date and time call was made  9/29/2022  5:01 PM    Hospital care reviewed  Records reviewed    Patient was hospitialized at  [address]    Date of Admission  09/27/22    Date of discharge  09/28/22    Diagnosis  Seziure Like Activity    Disposition  Home    Were the patients medications reviewed and updated  Yes    Current Symptoms  None    Shortness of breath severity  Mild    Weakness severity  Mild    Fatigue severity  Moderate      TCM Call     Post hospital issues  None Should patient be enrolled in anticoag monitoring? Yes    Scheduled for follow up? Yes    Did you obtain your prescribed medications  Yes    Do you need help managing your prescriptions or medications  No    Is transportation to your appointment needed  No    I have advised the patient to call PCP with any new or worsening symptoms  ISMAEL Gonzalez    Living Arrangements  Family members    Are you recieving any outpatient services  Yes    What type of services  [address]    Are you recieving home care services  Yes    Types of home care services  Hospice; Nurse visit        Patient presents today for follow-up for recent hospitalization  Unfortunately, he had a severe UTI  He was admitted to the hospital after a probable syncopal episode at home  He did have some are movement and drooling and was evaluated for seizure  EEG was normal   He was evaluated by Neurology who felt seizure activity may have been preceded by his infection and did not feel he needed further treatment  They did submit a form to [place] [employer] for loss of consciousness  He notes he is feeling much better  He has had no fever or chills  He does continue to require self catheterization unfortunately  Blood pressure is well controlled  He denies any chest pain, shortness a breath or palpitations  He has not drank alcohol in several weeks since his admission  He was drinking up to 6 drinks per day chronically      The following portions of the patient's history were reviewed and updated as appropriate: allergies, current medications, past family history, past medical history, past social history, past surgical history and problem list       Current Outpatient Medications:     Acetaminophen 500 MG, Take 1,000 mg by mouth, Disp: , Rfl:     amLODIPine (NORVASC) 5 mg tablet, Take 1 tablet (5 mg total) by mouth every evening, Disp: 90 tablet, Rfl: 3    amoxicillin (AMOXIL) 500 mg capsule, TAKE 2 TABLETS BY MOUTH ONE hour prior TO dental heena, Disp: , Rfl:     ascorbic acid (VITAMIN C) 250 MG CHEW, Chew 1 tablet (250 mg total) daily, Disp: 30 tablet, Rfl: 0    ascorbic acid (VITAMIN C) 250 MG CHEW, Chew 250 mg 2 (two) times a day, Disp: , Rfl:     aspirin 81 MG tablet, Take 1 tablet by mouth daily, Disp: , Rfl:     atorvastatin (LIPITOR) 20 mg tablet, TAKE ONE TABLET BY MOUTH ONCE EVERY DAY, Disp: 90 tablet, Rfl: 3    Blood Pressure Monitoring (Omron 5 Series BP Monitor) PEE, , Disp: , Rfl:     cephalexin (KEFLEX) 500 mg capsule, Take 1 capsule (500 mg total) by mouth every 6 (six) hours for 7 days, Disp: 28 capsule, Rfl: 0    cetirizine (ZyrTEC) 10 mg tablet, Take 10 mg by mouth as needed  , Disp: , Rfl:     Docusate Sodium (DSS) 100 MG CAPS, Take 100 mg by mouth 2 (two) times a day, Disp: , Rfl:     LACTOBACILLUS BIFIDUS PO, Take 1 tablet by mouth daily with breakfast, Disp: , Rfl:     LORazepam (Ativan) 0 5 mg tablet, Take 1 tablet (0 5 mg total) by mouth every 8 (eight) hours as needed for anxiety, Disp: 30 tablet, Rfl: 0    metoprolol tartrate (LOPRESSOR) 25 mg tablet, TAKE ONE TABLET BY MOUTH EVERY TWELVE HOURS, Disp: 180 tablet, Rfl: 3    montelukast (SINGULAIR) 10 mg tablet, TAKE ONE TABLET BY MOUTH DAILY, Disp: 90 tablet, Rfl: 3    Omega-3 Fatty Acids (FISH OIL) 1,000 mg, Take by mouth daily, Disp: , Rfl:     omeprazole (PriLOSEC) 40 MG capsule, Take one capsule by mouth once every day , Disp: 90 capsule, Rfl: 3    phenazopyridine (PYRIDIUM) 100 mg tablet, Take 100 mg by mouth, Disp: , Rfl:     saccharomyces boulardii (FLORASTOR) 250 mg capsule, Take 1 capsule (250 mg total) by mouth 2 (two) times a day for 10 days, Disp: 20 capsule, Rfl: 0    senna (SENOKOT) 8 6 MG tablet, Take 2 tablets by mouth daily, Disp: , Rfl:     sertraline (ZOLOFT) 50 mg tablet, TAKE ONE TABLET BY MOUTH EVERY DAY, Disp: 90 tablet, Rfl: 3    vitamin B-12 (CYANOCOBALAMIN) 500 MCG TABS, Take 1 tablet (500 mcg total) by mouth daily, Disp: 30 tablet, Rfl: 0     Review of Systems      Objective:      /84 (BP Location: Left arm, Patient Position: Sitting, Cuff Size: Standard)   Pulse 85   Resp 12   Ht 5' 8" (1 727 m)   Wt 87 2 kg (192 lb 3 2 oz)   SpO2 98%   BMI 29 22 kg/m²          Physical Exam      Richard Pals

## 2022-09-30 NOTE — ASSESSMENT & PLAN NOTE
His white cell count was down quite a bit recently    Repeat CBC prior to his Hematology consultation

## 2022-09-30 NOTE — ASSESSMENT & PLAN NOTE
Status post surgery at [facility]    He would like to continue care locally with our Urology team

## 2022-09-30 NOTE — ASSESSMENT & PLAN NOTE
He will finish up the cephalexin  Continue close follow-up with Urology  I will place a consult to continue care locally

## 2022-09-30 NOTE — ASSESSMENT & PLAN NOTE
I did reach out to Neurology  They did submit a form to [employer] for loss of consciousness  I would suggest he follows with Neurology  It is certainly reassuring that his EEG was negative  He also will be setting up an MRI [facility] in the near future  I gave him a prescription for this today

## 2022-10-02 LAB
BACTERIA BLD CULT: NORMAL
BACTERIA BLD CULT: NORMAL

## 2022-10-06 ENCOUNTER — TELEPHONE (OUTPATIENT)
Dept: NEUROLOGY | Facility: CLINIC | Age: 72
End: 2022-10-06

## 2022-10-06 NOTE — TELEPHONE ENCOUNTER
Called patient to provide him w/an HFU I scheduled for him  Also wanted to make sure he scheduled his MRI w/in 4wks, as I do not see one on his appointment desk  Patient advised me he does not need to see neurology and his PCP can help him and read his MRI  Patient declined our services  I advised PCP may refer him back to us after MRI is done  Patient did not advise where/when his MRI is scheduled  I will remove him from the patient list at this time, and I did cancel the appointment I made for him at this time

## 2022-10-11 ENCOUNTER — APPOINTMENT (OUTPATIENT)
Dept: LAB | Age: 72
End: 2022-10-11
Payer: MEDICARE

## 2022-10-11 DIAGNOSIS — C91.10 CLL (CHRONIC LYMPHOCYTIC LEUKEMIA) (HCC): Chronic | ICD-10-CM

## 2022-10-11 DIAGNOSIS — F33.9 DEPRESSION, RECURRENT (HCC): ICD-10-CM

## 2022-10-11 DIAGNOSIS — I10 ESSENTIAL HYPERTENSION: ICD-10-CM

## 2022-10-11 DIAGNOSIS — R73.9 HYPERGLYCEMIA: ICD-10-CM

## 2022-10-11 LAB
ALBUMIN SERPL BCP-MCNC: 4.4 G/DL (ref 3.5–5)
ALP SERPL-CCNC: 109 U/L (ref 46–116)
ALT SERPL W P-5'-P-CCNC: 43 U/L (ref 12–78)
ANION GAP SERPL CALCULATED.3IONS-SCNC: 6 MMOL/L (ref 4–13)
AST SERPL W P-5'-P-CCNC: 28 U/L (ref 5–45)
BASOPHILS # BLD AUTO: 0.03 THOUSANDS/ΜL (ref 0–0.1)
BASOPHILS NFR BLD AUTO: 0 % (ref 0–1)
BILIRUB SERPL-MCNC: 0.69 MG/DL (ref 0.2–1)
BUN SERPL-MCNC: 6 MG/DL (ref 5–25)
CALCIUM SERPL-MCNC: 9.6 MG/DL (ref 8.3–10.1)
CHLORIDE SERPL-SCNC: 101 MMOL/L (ref 96–108)
CHOLEST SERPL-MCNC: 134 MG/DL
CO2 SERPL-SCNC: 27 MMOL/L (ref 21–32)
CREAT SERPL-MCNC: 0.8 MG/DL (ref 0.6–1.3)
EOSINOPHIL # BLD AUTO: 0.11 THOUSAND/ΜL (ref 0–0.61)
EOSINOPHIL NFR BLD AUTO: 1 % (ref 0–6)
ERYTHROCYTE [DISTWIDTH] IN BLOOD BY AUTOMATED COUNT: 14.8 % (ref 11.6–15.1)
EST. AVERAGE GLUCOSE BLD GHB EST-MCNC: 111 MG/DL
GFR SERPL CREATININE-BSD FRML MDRD: 90 ML/MIN/1.73SQ M
GLUCOSE P FAST SERPL-MCNC: 135 MG/DL (ref 65–99)
HBA1C MFR BLD: 5.5 %
HCT VFR BLD AUTO: 38.5 % (ref 36.5–49.3)
HDLC SERPL-MCNC: 37 MG/DL
HGB BLD-MCNC: 12.4 G/DL (ref 12–17)
IMM GRANULOCYTES # BLD AUTO: 0.05 THOUSAND/UL (ref 0–0.2)
IMM GRANULOCYTES NFR BLD AUTO: 0 % (ref 0–2)
LDH SERPL-CCNC: 183 U/L (ref 81–234)
LDLC SERPL CALC-MCNC: 75 MG/DL (ref 0–100)
LYMPHOCYTES # BLD AUTO: 15.13 THOUSANDS/ΜL (ref 0.6–4.47)
LYMPHOCYTES NFR BLD AUTO: 83 % (ref 14–44)
MCH RBC QN AUTO: 33.3 PG (ref 26.8–34.3)
MCHC RBC AUTO-ENTMCNC: 32.2 G/DL (ref 31.4–37.4)
MCV RBC AUTO: 104 FL (ref 82–98)
MONOCYTES # BLD AUTO: 0.23 THOUSAND/ΜL (ref 0.17–1.22)
MONOCYTES NFR BLD AUTO: 1 % (ref 4–12)
NEUTROPHILS # BLD AUTO: 2.65 THOUSANDS/ΜL (ref 1.85–7.62)
NEUTS SEG NFR BLD AUTO: 15 % (ref 43–75)
NRBC BLD AUTO-RTO: 0 /100 WBCS
PLATELET # BLD AUTO: 286 THOUSANDS/UL (ref 149–390)
PMV BLD AUTO: 10.4 FL (ref 8.9–12.7)
POTASSIUM SERPL-SCNC: 3.8 MMOL/L (ref 3.5–5.3)
PROT SERPL-MCNC: 8.6 G/DL (ref 6.4–8.4)
RBC # BLD AUTO: 3.72 MILLION/UL (ref 3.88–5.62)
SODIUM SERPL-SCNC: 134 MMOL/L (ref 135–147)
TRIGL SERPL-MCNC: 110 MG/DL
TSH SERPL DL<=0.05 MIU/L-ACNC: 1.08 UIU/ML (ref 0.45–4.5)
WBC # BLD AUTO: 18.2 THOUSAND/UL (ref 4.31–10.16)

## 2022-10-11 PROCEDURE — 80053 COMPREHEN METABOLIC PANEL: CPT

## 2022-10-11 PROCEDURE — 84443 ASSAY THYROID STIM HORMONE: CPT

## 2022-10-11 PROCEDURE — 36415 COLL VENOUS BLD VENIPUNCTURE: CPT

## 2022-10-11 PROCEDURE — 80061 LIPID PANEL: CPT

## 2022-10-11 PROCEDURE — 83036 HEMOGLOBIN GLYCOSYLATED A1C: CPT

## 2022-10-11 PROCEDURE — 85025 COMPLETE CBC W/AUTO DIFF WBC: CPT

## 2022-10-11 PROCEDURE — 83615 LACTATE (LD) (LDH) ENZYME: CPT

## 2022-10-12 ENCOUNTER — TELEPHONE (OUTPATIENT)
Dept: FAMILY MEDICINE CLINIC | Facility: CLINIC | Age: 72
End: 2022-10-12

## 2022-10-12 NOTE — TELEPHONE ENCOUNTER
Pt called into office , asking for the results for his MRI that was done recently  I informed him  The doctor has not reviewed it ye, once this is done someone will call  *he was very pleasant on the phone

## 2022-10-13 ENCOUNTER — TELEPHONE (OUTPATIENT)
Dept: FAMILY MEDICINE CLINIC | Facility: CLINIC | Age: 72
End: 2022-10-13

## 2022-10-13 NOTE — TELEPHONE ENCOUNTER
Spoke with patient regarding Dr Lobo Bwoers message  Patient was little confuse because Dr Lobo Bowers message to him was little different   Patient is coming on Monday 10/17/22 for AWV and I recommended that he keep this appointment and speak  with Dr Lobo Bowers directly to clarified  Patient agreed

## 2022-10-13 NOTE — TELEPHONE ENCOUNTER
----- Message from Olivia Roth MD sent at 9/30/2022  2:48 PM EDT -----  Please let the patient know that Neurology has submitted his paperwork as a loss of consciousness and not a seizure  We will wait to hear back from [facility]  They do really want him to follow-up in their office and will be reaching out  Although we discussed holding off on neurology consultation, I do think it would be helpful at this time  ----- Message -----  From: Marcelo Moffett PA-C  Sent: 9/30/2022   2:24 PM EDT  To: MD Aubrey Calloway Dr,    I submitted notification to [facility] that he had an episode of loss of consciousness, not that he had a seizure (I scanned the form into the media section of Epic which is available for review)  This is a requirement by [place] law for any loss of consciousness, and at this point, we are still not exactly sure what caused the episode  What [facility] does with this information is ultimately up to them  Thank you for assisting with obtaining his MRI  We would like for him to follow up in our office, who should be reaching out to schedule his appointment soon if they have not already  Please let me know if there is anything else I can assist with  Thank you,   Cordell Lobe    ----- Message -----  From: Olivia Roth MD  Sent: 9/30/2022   1:20 PM EDT  To: ALISHA Llanes,    I saw this patient today for his post hospital visit  Upon review, it seems questionable as to whether not he had a seizure  I was wondering if his license needed to be revoked for 6 months in light of the questionable seizure activity as well as a negative EEG  I will be scheduling his MRI      Thank you,    Naila Reed

## 2022-10-14 NOTE — TELEPHONE ENCOUNTER
Pt called stated he is being referred back to our practice from PCP and will need an appt asap due to being referred for Diverticulum of bladder, Acute cystitis with hematuriaDiverticulum of bladder, Acute cystitis with hematuria Pt will only want to see Dr Jones Friends and at either [place] or [address] [place] [address]    Pt call [phone] or [phone]

## 2022-10-17 ENCOUNTER — OFFICE VISIT (OUTPATIENT)
Dept: FAMILY MEDICINE CLINIC | Facility: CLINIC | Age: 72
End: 2022-10-17
Payer: MEDICARE

## 2022-10-17 VITALS
BODY MASS INDEX: 29.73 KG/M2 | OXYGEN SATURATION: 97 % | HEIGHT: 68 IN | SYSTOLIC BLOOD PRESSURE: 140 MMHG | HEART RATE: 84 BPM | WEIGHT: 196.2 LBS | DIASTOLIC BLOOD PRESSURE: 70 MMHG | RESPIRATION RATE: 12 BRPM

## 2022-10-17 DIAGNOSIS — Z00.00 MEDICARE ANNUAL WELLNESS VISIT, SUBSEQUENT: ICD-10-CM

## 2022-10-17 DIAGNOSIS — K21.9 GASTROESOPHAGEAL REFLUX DISEASE WITHOUT ESOPHAGITIS: Chronic | ICD-10-CM

## 2022-10-17 DIAGNOSIS — D59.10 AUTOIMMUNE HEMOLYTIC ANEMIA (HCC): ICD-10-CM

## 2022-10-17 DIAGNOSIS — I27.20 PULMONARY HYPERTENSION (HCC): Chronic | ICD-10-CM

## 2022-10-17 DIAGNOSIS — F52.21 ERECTILE DYSFUNCTION OF NON-ORGANIC ORIGIN: Chronic | ICD-10-CM

## 2022-10-17 DIAGNOSIS — R56.9 SEIZURE-LIKE ACTIVITY (HCC): ICD-10-CM

## 2022-10-17 DIAGNOSIS — E78.00 HYPERCHOLESTEROLEMIA: Chronic | ICD-10-CM

## 2022-10-17 DIAGNOSIS — Z86.39 H/O VITAMIN D DEFICIENCY: ICD-10-CM

## 2022-10-17 DIAGNOSIS — Z12.5 PROSTATE CANCER SCREENING: ICD-10-CM

## 2022-10-17 DIAGNOSIS — C91.10 CLL (CHRONIC LYMPHOCYTIC LEUKEMIA) (HCC): Chronic | ICD-10-CM

## 2022-10-17 DIAGNOSIS — R73.9 HYPERGLYCEMIA: ICD-10-CM

## 2022-10-17 DIAGNOSIS — Z23 NEED FOR INFLUENZA VACCINATION: Primary | ICD-10-CM

## 2022-10-17 DIAGNOSIS — Z78.9 ALCOHOL USE: ICD-10-CM

## 2022-10-17 DIAGNOSIS — M48.061 SPINAL STENOSIS OF LUMBAR REGION WITHOUT NEUROGENIC CLAUDICATION: Chronic | ICD-10-CM

## 2022-10-17 DIAGNOSIS — K31.819 GAVE (GASTRIC ANTRAL VASCULAR ECTASIA): ICD-10-CM

## 2022-10-17 DIAGNOSIS — I10 ESSENTIAL HYPERTENSION: Chronic | ICD-10-CM

## 2022-10-17 PROCEDURE — G0439 PPPS, SUBSEQ VISIT: HCPCS | Performed by: FAMILY MEDICINE

## 2022-10-17 PROCEDURE — G0008 ADMIN INFLUENZA VIRUS VAC: HCPCS

## 2022-10-17 PROCEDURE — 90662 IIV NO PRSV INCREASED AG IM: CPT

## 2022-10-17 PROCEDURE — 99214 OFFICE O/P EST MOD 30 MIN: CPT | Performed by: FAMILY MEDICINE

## 2022-10-17 NOTE — ASSESSMENT & PLAN NOTE
Please his workup including MRI, which showed some chronic changes, and EEG, which showed no seizure-like activity, does not support a seizure diagnosis  I would like a neurology consultation to finalize any concerns of seizure-like activity

## 2022-10-17 NOTE — PATIENT INSTRUCTIONS
Medicare Preventive Visit Patient Instructions  Thank you for completing your Welcome to Medicare Visit or Medicare Annual Wellness Visit today  Your next wellness visit will be due in one year (10/18/2023)  The screening/preventive services that you may require over the next 5-10 years are detailed below  Some tests may not apply to you based off risk factors and/or age  Screening tests ordered at today's visit but not completed yet may show as past due  Also, please note that scanned in results may not display below  Preventive Screenings:  Service Recommendations Previous Testing/Comments   Colorectal Cancer Screening  · Colonoscopy    · Fecal Occult Blood Test (FOBT)/Fecal Immunochemical Test (FIT)  · Fecal DNA/Cologuard Test  · Flexible Sigmoidoscopy Age: 39-70 years old   Colonoscopy: every 10 years (May be performed more frequently if at higher risk)  OR  FOBT/FIT: every 1 year  OR  Cologuard: every 3 years  OR  Sigmoidoscopy: every 5 years  Screening may be recommended earlier than age 39 if at higher risk for colorectal cancer  Also, an individualized decision between you and your healthcare provider will decide whether screening between the ages of 74-80 would be appropriate   Colonoscopy: 10/23/2018  FOBT/FIT: Not on file  Cologuard: Not on file  Sigmoidoscopy: Not on file    Screening Current     Prostate Cancer Screening Individualized decision between patient and health care provider in men between ages of 53-78   Medicare will cover every 12 months beginning on the day after your 50th birthday PSA: 0 4 ng/mL           Hepatitis C Screening Once for adults born between 1945 and 1965  More frequently in patients at high risk for Hepatitis C Hep C Antibody: 06/21/2019    Screening Current   Diabetes Screening 1-2 times per year if you're at risk for diabetes or have pre-diabetes Fasting glucose: 135 mg/dL (10/11/2022)  A1C: 5 5 % (10/11/2022)  Screening Current   Cholesterol Screening Once every 5 years if you don't have a lipid disorder  May order more often based on risk factors  Lipid panel: 10/11/2022  Screening Not Indicated  History Lipid Disorder      Other Preventive Screenings Covered by Medicare:  1  Abdominal Aortic Aneurysm (AAA) Screening: covered once if your at risk  You're considered to be at risk if you have a family history of AAA or a male between the age of 73-68 who smoking at least 100 cigarettes in your lifetime  2  Lung Cancer Screening: covers low dose CT scan once per year if you meet all of the following conditions: (1) Age 50-69; (2) No signs or symptoms of lung cancer; (3) Current smoker or have quit smoking within the last 15 years; (4) You have a tobacco smoking history of at least 20 pack years (packs per day x number of years you smoked); (5) You get a written order from a healthcare provider  3  Glaucoma Screening: covered annually if you're considered high risk: (1) You have diabetes OR (2) Family history of glaucoma OR (3)  aged 48 and older OR (3)  American aged 72 and older  3  Osteoporosis Screening: covered every 2 years if you meet one of the following conditions: (1) Have a vertebral abnormality; (2) On glucocorticoid therapy for more than 3 months; (3) Have primary hyperparathyroidism; (4) On osteoporosis medications and need to assess response to drug therapy  5  HIV Screening: covered annually if you're between the age of 12-76  Also covered annually if you are younger than 13 and older than 72 with risk factors for HIV infection  For pregnant patients, it is covered up to 3 times per pregnancy      Immunizations:  Immunization Recommendations   Influenza Vaccine Annual influenza vaccination during flu season is recommended for all persons aged >= 6 months who do not have contraindications   Pneumococcal Vaccine   * Pneumococcal conjugate vaccine = PCV13 (Prevnar 13), PCV15 (Vaxneuvance), PCV20 (Prevnar 20)  * Pneumococcal polysaccharide vaccine = PPSV23 (Pneumovax) Adults 2364 years old: 1-3 doses may be recommended based on certain risk factors  Adults 72 years old: 1-2 doses may be recommended based off what pneumonia vaccine you previously received   Hepatitis B Vaccine 3 dose series if at intermediate or high risk (ex: diabetes, end stage renal disease, liver disease)   Tetanus (Td) Vaccine - COST NOT COVERED BY MEDICARE PART B Following completion of primary series, a booster dose should be given every 10 years to maintain immunity against tetanus  Td may also be given as tetanus wound prophylaxis  Tdap Vaccine - COST NOT COVERED BY MEDICARE PART B Recommended at least once for all adults  For pregnant patients, recommended with each pregnancy  Shingles Vaccine (Shingrix) - COST NOT COVERED BY MEDICARE PART B  2 shot series recommended in those aged 48 and above     Health Maintenance Due:      Topic Date Due   • Colorectal Cancer Screening  10/23/2028   • Hepatitis C Screening  Completed     Immunizations Due:      Topic Date Due   • COVID-19 Vaccine (3 - Moderna risk series) 12/14/2021   • Influenza Vaccine (1) 09/01/2022     Advance Directives   What are advance directives? Advance directives are legal documents that state your wishes and plans for medical care  These plans are made ahead of time in case you lose your ability to make decisions for yourself  Advance directives can apply to any medical decision, such as the treatments you want, and if you want to donate organs  What are the types of advance directives? There are many types of advance directives, and each state has rules about how to use them  You may choose a combination of any of the following:  · Living will: This is a written record of the treatment you want  You can also choose which treatments you do not want, which to limit, and which to stop at a certain time  This includes surgery, medicine, IV fluid, and tube feedings     · Durable power of  for [facility]): This is a written record that states who you want to make healthcare choices for you when you are unable to make them for yourself  This person, called a proxy, is usually a family member or a friend  You may choose more than 1 proxy  · Do not resuscitate (DNR) order:  A DNR order is used in case your heart stops beating or you stop breathing  It is a request not to have certain forms of treatment, such as CPR  A DNR order may be included in other types of advance directives  · Medical directive: This covers the care that you want if you are in a coma, near death, or unable to make decisions for yourself  You can list the treatments you want for each condition  Treatment may include pain medicine, surgery, blood transfusions, dialysis, IV or tube feedings, and a ventilator (breathing machine)  · Values history: This document has questions about your views, beliefs, and how you feel and think about life  This information can help others choose the care that you would choose  Why are advance directives important? An advance directive helps you control your care  Although spoken wishes may be used, it is better to have your wishes written down  Spoken wishes can be misunderstood, or not followed  Treatments may be given even if you do not want them  An advance directive may make it easier for your family to make difficult choices about your care  Weight Management   Why it is important to manage your weight:  Being overweight increases your risk of health conditions such as heart disease, high blood pressure, type 2 diabetes, and certain types of cancer  It can also increase your risk for osteoarthritis, sleep apnea, and other respiratory problems  Aim for a slow, steady weight loss  Even a small amount of weight loss can lower your risk of health problems  How to lose weight safely:  A safe and healthy way to lose weight is to eat fewer calories and get regular exercise   You can lose up about 1 pound a week by decreasing the number of calories you eat by 500 calories each day  Healthy meal plan for weight management:  A healthy meal plan includes a variety of foods, contains fewer calories, and helps you stay healthy  A healthy meal plan includes the following:  · Eat whole-grain foods more often  A healthy meal plan should contain fiber  Fiber is the part of grains, fruits, and vegetables that is not broken down by your body  Whole-grain foods are healthy and provide extra fiber in your diet  Some examples of whole-grain foods are whole-wheat breads and pastas, oatmeal, brown rice, and bulgur  · Eat a variety of vegetables every day  Include dark, leafy greens such as spinach, kale, karsten greens, and mustard greens  Eat yellow and orange vegetables such as carrots, sweet potatoes, and winter squash  · Eat a variety of fruits every day  Choose fresh or canned fruit (canned in its own juice or light syrup) instead of juice  Fruit juice has very little or no fiber  · Eat low-fat dairy foods  Drink fat-free (skim) milk or 1% milk  Eat fat-free yogurt and low-fat cottage cheese  Try low-fat cheeses such as mozzarella and other reduced-fat cheeses  · Choose meat and other protein foods that are low in fat  Choose beans or other legumes such as split peas or lentils  Choose fish, skinless poultry (chicken or turkey), or lean cuts of red meat (beef or pork)  Before you cook meat or poultry, cut off any visible fat  · Use less fat and oil  Try baking foods instead of frying them  Add less fat, such as margarine, sour cream, regular salad dressing and mayonnaise to foods  Eat fewer high-fat foods  Some examples of high-fat foods include french fries, doughnuts, ice cream, and cakes  · Eat fewer sweets  Limit foods and drinks that are high in sugar  This includes candy, cookies, regular soda, and sweetened drinks  Exercise:  Exercise at least 30 minutes per day on most days of the week  Some examples of exercise include walking, biking, dancing, and swimming  You can also fit in more physical activity by taking the stairs instead of the elevator or parking farther away from stores  Ask your healthcare provider about the best exercise plan for you  © Copyright [employer] 2018 Information is for End User's use only and may not be sold, redistributed or otherwise used for commercial purposes   All illustrations and images included in CareNotes® are the copyrighted property of A D A M [employer]  or [address] [place] [address]

## 2022-10-17 NOTE — PROGRESS NOTES
Assessment and Plan:     Problem List Items Addressed This Visit        Digestive    Esophageal reflux (Chronic)     He is stable at this time  Continue omeprazole  GAVE (gastric antral vascular ectasia)     Continue to monitor CBC            Cardiovascular and Mediastinum    Essential hypertension (Chronic)     Blood pressure was little borderline today but he is continue to monitor at home  Pulmonary hypertension ([facility] ) (Chronic)     He remains clinically stable  Other    CLL (chronic lymphocytic leukemia) (HCC) (Chronic)     Continue close follow-up with Hematology         Relevant Orders    Comprehensive metabolic panel    Erectile dysfunction of non-organic origin (Chronic)    Spinal stenosis of lumbar region (Chronic)    Hypercholesterolemia (Chronic)    Relevant Orders    Comprehensive metabolic panel    Autoimmune hemolytic anemia ([facility] [address] )     Continue close follow-up with Hematology         H/O vitamin D deficiency    Seizure-like activity ([facility] [address] )     Please his workup including MRI, which showed some chronic changes, and EEG, which showed no seizure-like activity, does not support a seizure diagnosis  I would like a neurology consultation to finalize any concerns of seizure-like activity  Relevant Orders    Ambulatory Referral to Neurology    Alcohol use    Hyperglycemia    Relevant Orders    Comprehensive metabolic panel    Hemoglobin A1C      Other Visit Diagnoses     Need for influenza vaccination    -  Primary    Relevant Orders    influenza vaccine, high-dose, PF 0 7 mL (FLUZONE HIGH-DOSE) (Completed)    Medicare annual wellness visit, subsequent        Prostate cancer screening        Relevant Orders    PSA, Total Screen           Preventive health issues were discussed with patient, and age appropriate screening tests were ordered as noted in patient's After Visit Summary    Personalized health advice and appropriate referrals for health education or preventive services given if needed, as noted in patient's After Visit Summary  History of Present Illness:     Patient presents for a Medicare Wellness Visit    HPI patient presents today for Medicare physical as well as a follow-up for chronic health issues  He continues to be frustrated by his recent episode of sepsis with loss of consciousness  He does not feel he should lose his license  He also is very upset with [facility] for not initially placing him on antibiotics  Fortunately, he seems to be back at baseline  He is having no further fever or chills  He denies excessive fatigue  He is having no chest pain, shortness of breath or palpitations  He does continue to require self catheterization routinely  Patient Care Team:  Mari Reis MD as PCP - MD Luc Killian MD     Review of Systems:     Review of Systems   Constitutional: Negative for appetite change, chills, fatigue, fever and unexpected weight change  HENT: Negative for trouble swallowing  Eyes: Negative for visual disturbance  Respiratory: Negative for cough, chest tightness, shortness of breath and wheezing  Cardiovascular: Negative for chest pain, palpitations and leg swelling  Gastrointestinal: Negative for abdominal distention, abdominal pain, blood in stool, constipation and diarrhea  Endocrine: Negative for polyuria  Genitourinary: Negative for difficulty urinating and flank pain  Musculoskeletal: Negative for arthralgias and myalgias  Skin: Negative for rash  Neurological: Negative for dizziness, tremors, syncope, weakness and light-headedness  Hematological: Negative for adenopathy  Does not bruise/bleed easily  Psychiatric/Behavioral: Negative for dysphoric mood and sleep disturbance  The patient is not nervous/anxious           Problem List:     Patient Active Problem List   Diagnosis   • Essential hypertension   • CLL (chronic lymphocytic leukemia) ([facility] [address] )   • Allergic rhinitis due to pollen   • Erectile dysfunction of non-organic origin   • Esophageal reflux   • Pulmonary hypertension (HCC)   • Spinal stenosis of lumbar region   • Iron deficiency anemia   • DDD (degenerative disc disease), cervical   • DIXIE not currently treated   • Hypercholesterolemia   • BPH with obstruction/lower urinary tract symptoms   • GAVE (gastric antral vascular ectasia)   • History of right hip replacement   • Autoimmune hemolytic anemia (HCC)   • Encysted hydrocele   • H/O vitamin D deficiency   • Situational anxiety   • Obesity (BMI 30 0-34  9)   • Diverticulum of bladder   • Seizure-like activity (HCC)   • Acute cystitis with hematuria   • Alcohol use   • Hyperglycemia      Past Medical and Surgical History:     Past Medical History:   Diagnosis Date   • Anxiety    • BPH (benign prostatic hypertrophy)    • Cancer (HCC)     CLL   • CLL (chronic lymphocytic leukemia) ([facility] )     2009   • Colon polyp    • Concussion     Resolved: 08/22/16   • Depression    • Diverticulitis 1/13/2020 2014 CT done 11/19 12/19 CT done    • E coli bacteremia 6/27/2021 2/2 blood cultures with [facility]  Source appears to be the urine    • Epididymitis 5/19/2021 5/2021   • Gastrointestinal hemorrhage     Last assessed: 08/27/13   • GERD (gastroesophageal reflux disease)    • Hearing loss of aging    • Hiatal hernia    • History of transfusion    • Hyperlipidemia    • Hypertension    • Iron deficiency anemia    • Microscopic hematuria     Last assessed: 06/28/13   • OA (osteoarthritis)     right hip   • Pneumothorax    • Primary osteoarthritis of right hip 9/29/2017    He is status post right hip arthroplasty   • Prostatitis    • Pulmonary hypertension ([facility] )    • Seasonal allergies    • Urinary tract infection associated with catheterization of urinary tract ([facility] ) 2/5/2021    Pseudomonal UTI asymptomatic  Per Urology do not treat at this time     • Urinary tract infection associated with catheterization of urinary tract ([facility] [address] ) 2021    Pseudomonal UTI asymptomatic  Per Urology do not treat at this time  He did have urosepsis from E coli    • UTI (urinary tract infection)     in past   • Wears glasses      Past Surgical History:   Procedure Laterality Date   • COLONOSCOPY     • CYSTOSCOPY  10/09/2014    Diagnostic   • CYSTOSCOPY  2020   • FL CYSTOGRAM  8/15/2022   • FRACTURE SURGERY      left lower arm   • FRACTURE SURGERY      left femur   • HERNIA REPAIR     • JOINT REPLACEMENT Right     hip   • OTHER SURGICAL HISTORY  2011    Spinal anesthesia epidural   • ME TOTAL HIP ARTHROPLASTY Right 2017    Procedure: ARTHROPLASTY HIP TOTAL ANTERIOR;  Surgeon: Sanju Borrero MD;  Location: [facility];  Service: Orthopedics   • TONSILLECTOMY AND ADENOIDECTOMY     • TRANSURETHRAL RESECTION OF PROSTATE      x 2   • WRIST SURGERY        Family History:     Family History   Problem Relation Age of Onset   • No Known Problems Mother    • Heart attack Father    • Diabetes Brother    • Prostate cancer Brother       Social History:     Social History     Socioeconomic History   • Marital status: /Civil Union     Spouse name: None   • Number of children: None   • Years of education: None   • Highest education level: None   Occupational History   • None   Tobacco Use   • Smoking status: Former Smoker     Packs/day: 1 00     Years: 15 00     Pack years: 15 00     Types: Cigarettes     Start date:      Quit date: 1980     Years since quittin 1   • Smokeless tobacco: Never Used   Vaping Use   • Vaping Use: Never used   Substance and Sexual Activity   • Alcohol use:  Yes     Alcohol/week: 10 0 standard drinks     Types: 10 Cans of beer per week   • Drug use: Yes     Types: Marijuana     Comment: "medical marijuana"   • Sexual activity: Not Currently   Other Topics Concern   • None   Social History Narrative   • None     Social Determinants of Health     Financial Resource Strain: Low Risk    • Difficulty of Paying Living Expenses: Not hard at all   Food Insecurity: Not on file   Transportation Needs: No Transportation Needs   • Lack of Transportation (Medical): No   • Lack of Transportation (Non-Medical): No   Physical Activity: Not on file   Stress: Not on file   Social Connections: Not on file   Intimate Partner Violence: Not on file   Housing Stability: Not on file      Medications and Allergies:     Current Outpatient Medications   Medication Sig Dispense Refill   • Acetaminophen 500 MG Take 1,000 mg by mouth     • amLODIPine (NORVASC) 5 mg tablet Take 1 tablet (5 mg total) by mouth every evening 90 tablet 3   • amoxicillin (AMOXIL) 500 mg capsule TAKE 2 TABLETS BY MOUTH ONE hour prior TO dental vistit     • ascorbic acid (VITAMIN C) 250 MG CHEW Chew 250 mg 2 (two) times a day     • aspirin 81 MG tablet Take 1 tablet by mouth daily     • atorvastatin (LIPITOR) 20 mg tablet TAKE ONE TABLET BY MOUTH ONCE EVERY DAY 90 tablet 3   • Blood Pressure Monitoring (Omron 5 Series BP Monitor) PEE      • cetirizine (ZyrTEC) 10 mg tablet Take 10 mg by mouth as needed       • Docusate Sodium (DSS) 100 MG CAPS Take 100 mg by mouth 2 (two) times a day     • LACTOBACILLUS BIFIDUS PO Take 1 tablet by mouth daily with breakfast     • LORazepam (Ativan) 0 5 mg tablet Take 1 tablet (0 5 mg total) by mouth every 8 (eight) hours as needed for anxiety 30 tablet 0   • metoprolol tartrate (LOPRESSOR) 25 mg tablet TAKE ONE TABLET BY MOUTH EVERY TWELVE HOURS 180 tablet 3   • montelukast (SINGULAIR) 10 mg tablet TAKE ONE TABLET BY MOUTH DAILY 90 tablet 3   • Omega-3 Fatty Acids (FISH OIL) 1,000 mg Take by mouth daily     • omeprazole (PriLOSEC) 40 MG capsule Take one capsule by mouth once every day   90 capsule 3   • phenazopyridine (PYRIDIUM) 100 mg tablet Take 100 mg by mouth     • senna (SENOKOT) 8 6 MG tablet Take 2 tablets by mouth daily     • sertraline (ZOLOFT) 50 mg tablet TAKE ONE TABLET BY MOUTH EVERY DAY 90 tablet 3   • vitamin B-12 (CYANOCOBALAMIN) 500 MCG TABS Take 1 tablet (500 mcg total) by mouth daily 30 tablet 0     No current facility-administered medications for this visit  Allergies   Allergen Reactions   • Codeine Other (See Comments)     agitated   • Sulfamethoxazole-Trimethoprim GI Intolerance and Abdominal Pain     upset stomach      Immunizations:     Immunization History   Administered Date(s) Administered   • COVID-19 MODERNA VACC 0 25 ML IM BOOSTER 11/16/2021   • COVID-19 MODERNA VACC 0 5 ML IM 04/06/2021, 05/07/2021   • INFLUENZA 09/21/2015   • Influenza Quadrivalent Preservative Free 3 years and older IM 10/14/2016, 09/15/2017   • Influenza Split Preservative Free ID 09/10/2013   • Influenza, high dose seasonal 0 7 mL 10/01/2018, 10/15/2019, 09/18/2020, 09/29/2021, 10/17/2022   • Influenza, seasonal, injectable 08/31/2012, 09/23/2014   • Pneumococcal Conjugate 13-Valent 10/14/2016   • Pneumococcal Polysaccharide PPV23 09/10/2013, 08/30/2019   • Tdap 08/22/2016   • Zoster 04/29/2015, 08/20/2018   • Zoster Vaccine Recombinant 08/20/2018, 10/19/2018      Health Maintenance:         Topic Date Due   • Colorectal Cancer Screening  10/23/2028   • Hepatitis C Screening  Completed         Topic Date Due   • COVID-19 Vaccine (3 - Moderna risk series) 12/14/2021      Medicare Screening Tests and Risk Assessments:     Carlyle Petersen is here for his Subsequent Wellness visit  Health Risk Assessment:   Patient rates overall health as very good  Patient feels that their physical health rating is slightly better  Patient is satisfied with their life  Eyesight was rated as same  Hearing was rated as same  Patient feels that their emotional and mental health rating is same  Patients states they are sometimes angry  Patient states they are never, rarely unusually tired/fatigued  Pain experienced in the last 7 days has been none  Patient states that he has experienced no weight loss or gain in last 6 months       Depression Screening:   PHQ-9 Score: 0      Fall Risk Screening: In the past year, patient has experienced: no history of falling in past year      Home Safety:  Patient does not have trouble with stairs inside or outside of their home  Patient has working smoke alarms and has no working carbon monoxide detector  Home safety hazards include: none  Nutrition:   Current diet is Regular  Medications:   Patient is currently taking over-the-counter supplements  OTC medications include: see medication list  Patient is able to manage medications  Activities of Daily Living (ADLs)/Instrumental Activities of Daily Living (IADLs):   Walk and transfer into and out of bed and chair?: Yes  Dress and groom yourself?: Yes    Bathe or shower yourself?: Yes    Feed yourself? Yes  Do your laundry/housekeeping?: Yes  Manage your money, pay your bills and track your expenses?: Yes  Make your own meals?: Yes    Do your own shopping?: Yes    Previous Hospitalizations:   Any hospitalizations or ED visits within the last 12 months?: Yes    How many hospitalizations have you had in the last year?: 1-2    Advance Care Planning:   Living will: Yes    Durable POA for healthcare:  Yes    Advanced directive: Yes    End of Life Decisions reviewed with patient: Yes      Cognitive Screening:   Provider or family/friend/caregiver concerned regarding cognition?: No    PREVENTIVE SCREENINGS      Cardiovascular Screening:    General: Screening Not Indicated and History Lipid Disorder      Diabetes Screening:     General: Screening Current      Colorectal Cancer Screening:     General: Screening Current      Prostate Cancer Screening:    General: Risks and Benefits Discussed    Due for: PSA      Osteoporosis Screening:    General: Screening Not Indicated      Abdominal Aortic Aneurysm (AAA) Screening:    Risk factors include: age between 73-67 yo and tobacco use        Lung Cancer Screening:     General: Screening Not Indicated      Hepatitis C Screening:    General: Screening Current    Screening, Brief Intervention, and Referral to Treatment (SBIRT)    Screening  Typical number of drinks in a day: 2  Typical number of drinks in a week: 8  Interpretation: Low risk drinking behavior  Single Item Drug Screening:  How often have you used an illegal drug (including marijuana) or a prescription medication for non-medical reasons in the past year? never    Single Item Drug Screen Score: 0  Interpretation: Negative screen for possible drug use disorder    Brief Intervention  Healthy alcohol use/limits discussed  No exam data present     Physical Exam:     /70 (BP Location: Left arm, Patient Position: Sitting, Cuff Size: Standard)   Pulse 84   Resp 12   Ht 5' 8" (1 727 m)   Wt 89 kg (196 lb 3 2 oz)   SpO2 97%   BMI 29 83 kg/m²     Physical Exam  Constitutional:       General: He is not in acute distress  Appearance: Normal appearance  He is well-developed  He is obese  He is not diaphoretic  HENT:      Head: Normocephalic  Right Ear: External ear normal       Left Ear: External ear normal       Nose: Nose normal    Eyes:      General:         Right eye: No discharge  Left eye: No discharge  Conjunctiva/sclera: Conjunctivae normal       Pupils: Pupils are equal, round, and reactive to light  Neck:      Thyroid: No thyromegaly  Trachea: No tracheal deviation  Cardiovascular:      Rate and Rhythm: Normal rate and regular rhythm  Heart sounds: Normal heart sounds  No murmur heard  No friction rub  Pulmonary:      Effort: Pulmonary effort is normal  No respiratory distress  Breath sounds: Normal breath sounds  No wheezing  Chest:      Chest wall: No tenderness  Abdominal:      General: There is no distension  Palpations: There is no mass  Tenderness: There is no abdominal tenderness  There is no guarding or rebound  Hernia: No hernia is present  Musculoskeletal:         General: No swelling or deformity  Cervical back: Normal range of motion  Right lower leg: No edema  Left lower leg: No edema  Skin:     Findings: No erythema or rash  Neurological:      General: No focal deficit present  Mental Status: He is alert  Cranial Nerves: No cranial nerve deficit  Coordination: Coordination normal    Psychiatric:         Thought Content:  Thought content normal           Alvin Argueta MD

## 2022-10-17 NOTE — LETTER
October 17, 2022     Patient: Len Chaney  YOB: 1950  Date of Visit: 10/17/2022      To Whom it May Concern:    Pio Devine is under my professional care  Robert Galdamez was seen in my office on 10/17/2022  He was previously admitted for a questionable seizure in the setting of urosepsis  Subsequent workup has shown no acute issues on his MRI and his EEG is normal   His seizure appears to have been provoked by sepsis and he does not appear to be at significant risk for a seizure disorder  If you have any questions or concerns, please don't hesitate to call            Sincerely,          Golden Haddad MD        CC: No Recipients

## 2022-10-18 NOTE — TELEPHONE ENCOUNTER
Patient is taking Keflex once a day and continuing CIC 4-5 daily as he was doing when he saws him a year ago - He is requesting a script for urine culture and urinalysis

## 2022-10-31 ENCOUNTER — OFFICE VISIT (OUTPATIENT)
Dept: FAMILY MEDICINE CLINIC | Facility: CLINIC | Age: 72
End: 2022-10-31

## 2022-10-31 VITALS — OXYGEN SATURATION: 95 % | SYSTOLIC BLOOD PRESSURE: 132 MMHG | DIASTOLIC BLOOD PRESSURE: 76 MMHG | HEART RATE: 84 BPM

## 2022-10-31 DIAGNOSIS — I10 ESSENTIAL HYPERTENSION: Primary | Chronic | ICD-10-CM

## 2022-10-31 DIAGNOSIS — R56.9 SEIZURE-LIKE ACTIVITY (HCC): ICD-10-CM

## 2022-10-31 RX ORDER — CEPHALEXIN 500 MG/1
CAPSULE ORAL
COMMUNITY
Start: 2022-10-26

## 2022-10-31 NOTE — ASSESSMENT & PLAN NOTE
Blood pressure was initially elevated but came down throughout the visit  Continue routine follow-up continue current regimen

## 2022-10-31 NOTE — ASSESSMENT & PLAN NOTE
Fortunately, workup including EEG as well as CT and MRI of his brain did not reveal any significant issues  His questionable seizure-like activity did occur in the setting of significant urosepsis which seems to explain the event  I feel that paperwork that I believe he can safely drive

## 2022-10-31 NOTE — PROGRESS NOTES
Assessment/Plan:       Problem List Items Addressed This Visit        Cardiovascular and Mediastinum    Essential hypertension - Primary (Chronic)     Blood pressure was initially elevated but came down throughout the visit  Continue routine follow-up continue current regimen  Other    Seizure-like activity ([address] )     Fortunately, workup including EEG as well as CT and MRI of his brain did not reveal any significant issues  His questionable seizure-like activity did occur in the setting of significant urosepsis which seems to explain the event  I feel that paperwork that I believe he can safely drive  Subjective:      Patient ID: Ayde Silver is a 81 y o  male  HPI patient presents today for follow-up for chronic health issues  Fortunately, he continues do well  He really cut back on drinking alcohol over the past several years  He has had no recurrent seizure-type activity that occurred in the setting of sepsis  He does have paperwork to be able to return to driving  Fortunately, he has only ever had 1 syncopal episode in this was related to his recent sepsis  He denies any recurrent episodes of loss of consciousness or lightheadedness  He has history of hypertension and denies chest pain, shortness of breath, palpitations or lightheadedness      The following portions of the patient's history were reviewed and updated as appropriate: allergies, current medications, past family history, past medical history, past social history, past surgical history and problem list       Current Outpatient Medications:   •  Acetaminophen 500 MG, Take 1,000 mg by mouth, Disp: , Rfl:   •  amLODIPine (NORVASC) 5 mg tablet, Take 1 tablet (5 mg total) by mouth every evening, Disp: 90 tablet, Rfl: 3  •  amoxicillin (AMOXIL) 500 mg capsule, TAKE 2 TABLETS BY MOUTH ONE hour prior TO dental vistit, Disp: , Rfl:   •  ascorbic acid (VITAMIN C) 250 MG CHEW, Chew 250 mg 2 (two) times a day, Disp: , Rfl: •  aspirin 81 MG tablet, Take 1 tablet by mouth daily, Disp: , Rfl:   •  atorvastatin (LIPITOR) 20 mg tablet, TAKE ONE TABLET BY MOUTH ONCE EVERY DAY, Disp: 90 tablet, Rfl: 3  •  Blood Pressure Monitoring (Omron 5 Series BP Monitor) PEE, , Disp: , Rfl:   •  cephalexin (KEFLEX) 500 mg capsule, TAKE ONE CAPSULE BY MOUTH EVERY 24 HOURS, Disp: , Rfl:   •  cetirizine (ZyrTEC) 10 mg tablet, Take 10 mg by mouth as needed  , Disp: , Rfl:   •  Docusate Sodium (DSS) 100 MG CAPS, Take 100 mg by mouth 2 (two) times a day, Disp: , Rfl:   •  LACTOBACILLUS BIFIDUS PO, Take 1 tablet by mouth daily with breakfast, Disp: , Rfl:   •  LORazepam (Ativan) 0 5 mg tablet, Take 1 tablet (0 5 mg total) by mouth every 8 (eight) hours as needed for anxiety, Disp: 30 tablet, Rfl: 0  •  metoprolol tartrate (LOPRESSOR) 25 mg tablet, TAKE ONE TABLET BY MOUTH EVERY TWELVE HOURS, Disp: 180 tablet, Rfl: 3  •  montelukast (SINGULAIR) 10 mg tablet, TAKE ONE TABLET BY MOUTH DAILY, Disp: 90 tablet, Rfl: 3  •  Omega-3 Fatty Acids (FISH OIL) 1,000 mg, Take by mouth daily, Disp: , Rfl:   •  omeprazole (PriLOSEC) 40 MG capsule, Take one capsule by mouth once every day , Disp: 90 capsule, Rfl: 3  •  phenazopyridine (PYRIDIUM) 100 mg tablet, Take 100 mg by mouth, Disp: , Rfl:   •  senna (SENOKOT) 8 6 MG tablet, Take 2 tablets by mouth daily, Disp: , Rfl:   •  sertraline (ZOLOFT) 50 mg tablet, TAKE ONE TABLET BY MOUTH EVERY DAY, Disp: 90 tablet, Rfl: 3  •  vitamin B-12 (CYANOCOBALAMIN) 500 MCG TABS, Take 1 tablet (500 mcg total) by mouth daily, Disp: 30 tablet, Rfl: 0     Review of Systems   Constitutional: Negative for fatigue  HENT: Negative for trouble swallowing  Respiratory: Negative for chest tightness, shortness of breath and wheezing  Cardiovascular: Negative for chest pain, palpitations and leg swelling  Gastrointestinal: Negative for abdominal pain, constipation and diarrhea  Endocrine: Negative for polyuria     Genitourinary: Negative for difficulty urinating and flank pain  Musculoskeletal: Negative for arthralgias and myalgias  Skin: Negative for rash  Psychiatric/Behavioral: Negative for sleep disturbance  Objective:      /76 (BP Location: Left arm, Patient Position: Sitting, Cuff Size: Large)   Pulse 84   SpO2 95%          Physical Exam  Vitals reviewed  Constitutional:       Appearance: He is well-developed  He is obese  HENT:      Head: Normocephalic  Cardiovascular:      Rate and Rhythm: Regular rhythm  Heart sounds: Normal heart sounds  No murmur heard  Pulmonary:      Effort: No respiratory distress  Breath sounds: No wheezing or rales  Abdominal:      General: There is no distension  Tenderness: There is no abdominal tenderness  Skin:     Findings: No erythema or rash  Neurological:      Mental Status: He is alert and oriented to person, place, and time             [address]

## 2022-11-04 ENCOUNTER — OFFICE VISIT (OUTPATIENT)
Dept: HEMATOLOGY ONCOLOGY | Facility: CLINIC | Age: 72
End: 2022-11-04

## 2022-11-04 VITALS
BODY MASS INDEX: 30.16 KG/M2 | OXYGEN SATURATION: 96 % | SYSTOLIC BLOOD PRESSURE: 142 MMHG | HEART RATE: 67 BPM | HEIGHT: 68 IN | DIASTOLIC BLOOD PRESSURE: 78 MMHG | TEMPERATURE: 96.9 F | RESPIRATION RATE: 18 BRPM | WEIGHT: 199 LBS

## 2022-11-04 DIAGNOSIS — C91.10 CLL (CHRONIC LYMPHOCYTIC LEUKEMIA) (HCC): Primary | Chronic | ICD-10-CM

## 2022-11-04 NOTE — PROGRESS NOTES
Hematology / Oncology Outpatient Follow Up Note    Brandy Griggs 81 y o  male HBC:4/50/2105 ATU:[id number]         Date:  11/4/2022    Assessment / Plan: Hilario Floyd old gentleman with chronic lymphocytic leukemia diagnosed in March 2010  He has lymphocytosis with no lymphadenopathy   He has history of iron deficiency anemia for which he was on venofer infusion   In April 2021, he was diagnosed with autoimmune hemolytic anemia for which he was treated with prednisone   His hemoglobin was completely normalized  He is currently on observation  His recent CBC showed normal hemoglobin  There is no progressive lymphocytosis  He is quite stable from hematology standpoint  I recommended him to continue surveillance  I will see him again in 6 months with CBC  He is in agreement with my recommendations                                      Subjective:      HPI:             Interval History:  A 76 year old gentleman with chronic lymphocytic leukemia diagnosed in March 2010  He has been on watchful waiting because he has absolutely no symptoms from hematology standpoint  Over the last few years, he had slowly progressive microcytic anemia  He was found to have iron deficiency   His GI workup was negative  He was treated with IV iron resulting in normal hemoglobin   In early April 2021, he was hospitalized with autoimmune hemolytic anemia for which she was treated with prednisone, resulting in normal hemoglobin  He is currently off the prednisone  He presents today for routine follow-up  He underwent bladder diverticulosis surgery at [facility] in August 9119 which was complicated with infection  Therefore, he had temporary anemia  His recent CBC showed normal hemoglobin  He has quite stable lymphocytosis  His platelet count is normal   He is asymptomatic from hematology standpoint  He denied fever, chills or night sweats  He has no lymphadenopathy    His performance status is normal                                   Objective:      Primary Diagnosis:     1  Chronic lymphocytic leukemia  Diagnosed in March 2010  CD-5, CD-23 positive  Kappa light chain positive  CD-38 negative  ZAP-70 positive disease   Diagnosed in 2010     2  Iron deficiency anemia      3    Autoimmune hemolytic anemia, diagnosed in April 2021      Cancer Staging:  No matching staging information was found for the patient         Previous Hematologic/ Oncologic Treatment:         Prednisone, completed in July 2021      Current Hematologic/ Oncologic Treatment:        observation         Disease Status:      NA     Test Results:     Pathology:           Radiology:           Laboratory:     See below  For CBC CMP and ferritin      Physical Exam:        General Appearance:    Alert, oriented          Eyes:    PERRL   Ears:    Normal external ear canals, both ears   Nose:   Nares normal, septum midline   Throat:   Mucosa moist  Pharynx without injection  Neck:   Supple         Lungs:     Clear to auscultation bilaterally   Chest Wall:    No tenderness or deformity    Heart:    Regular rate and rhythm         Abdomen:     Soft, non-tender, bowel sounds +, no organomegaly               Extremities:   Extremities no cyanosis or edema         Skin:   no rash or icterus  Lymph nodes:   Cervical, supraclavicular, and axillary nodes normal   Neurologic:   CNII-XII intact, normal strength, sensation and reflexes     Throughout           ROS: Review of Systems   All other systems reviewed and are negative  Imaging: No results found        Labs:   Lab Results   Component Value Date    WBC 18 20 (H) 10/11/2022    HGB 12 4 10/11/2022    HCT 38 5 10/11/2022     (H) 10/11/2022     10/11/2022     Lab Results   Component Value Date     (L) 07/20/2015    K 3 8 10/11/2022     10/11/2022    CO2 27 10/11/2022    ANIONGAP 9 07/20/2015    BUN 6 10/11/2022    CREATININE 0 80 10/11/2022    GLUCOSE 130 01/10/2021 GLUF 135 (H) 10/11/2022    CALCIUM 9 6 10/11/2022    CORRECTEDCA 9 1 09/28/2022    AST 28 10/11/2022    ALT 43 10/11/2022    ALKPHOS 109 10/11/2022    PROT 8 0 07/20/2015    BILITOT 0 47 07/20/2015    EGFR 90 10/11/2022         Lab Results   Component Value Date     10/11/2022         Lab Results   Component Value Date    PSA 0 4 07/29/2021    PSA 0 4 01/27/2021    PSA 0 2 01/30/2020         Lab Results   Component Value Date    IRON 107 04/09/2021    TIBC 324 04/09/2021    FERRITIN 271 10/06/2021       Lab Results   Component Value Date    HEMGUEEH39 361 01/16/2021       Lab Results   Component Value Date    FOLATE 11 8 01/16/2021         Current Medications: Reviewed  Allergies: Reviewed  PMH/FH/SH:  Reviewed      Vital Sign:    Body surface area is 2 04 meters squared      Wt Readings from Last 3 Encounters:   11/04/22 90 3 kg (199 lb)   10/27/22 88 9 kg (196 lb)   10/17/22 89 kg (196 lb 3 2 oz)        Temp Readings from Last 3 Encounters:   11/04/22 (!) 96 9 °F (36 1 °C) (Tympanic)   09/28/22 98 2 °F (36 8 °C) (Temporal)   04/14/22 (!) 96 9 °F (36 1 °C) (Tympanic Core)        BP Readings from Last 3 Encounters:   11/04/22 142/78   10/31/22 132/76   10/17/22 140/70         Pulse Readings from Last 3 Encounters:   11/04/22 67   10/31/22 84   10/17/22 84     @LASTSAO2(3)@

## 2022-11-16 ENCOUNTER — TELEPHONE (OUTPATIENT)
Dept: OTHER | Facility: OTHER | Age: 72
End: 2022-11-16

## 2022-11-16 NOTE — TELEPHONE ENCOUNTER
Patient asking for Dr Francoise Marin to call him to discuss if he could need another colonoscopy  Last had one in 2018 with another provider; unsure of results or recommendations  Patient asking for Dr Brian Purvis to call him personally to discuss

## 2022-11-22 ENCOUNTER — TELEPHONE (OUTPATIENT)
Dept: GASTROENTEROLOGY | Facility: CLINIC | Age: 72
End: 2022-11-22

## 2022-11-22 NOTE — TELEPHONE ENCOUNTER
----- Message from Anabelle Campbell MD sent at 11/18/2022  9:14 AM EST -----  Regarding: Office visit  Please schedule him for an office visit with me in approx 3-4 months because of his history of colon polyps  Thanks!

## 2022-11-22 NOTE — TELEPHONE ENCOUNTER
I called the patient and scheduled a follow up visit with him for Dr Montesinos  The patient was agreeable and appt reminder wasn't sent in the mail and emailed

## 2022-11-27 PROBLEM — N30.01 ACUTE CYSTITIS WITH HEMATURIA: Status: RESOLVED | Noted: 2022-09-27 | Resolved: 2022-11-27

## 2022-11-29 ENCOUNTER — OFFICE VISIT (OUTPATIENT)
Dept: UROLOGY | Facility: CLINIC | Age: 72
End: 2022-11-29

## 2022-11-29 VITALS
OXYGEN SATURATION: 100 % | WEIGHT: 195 LBS | HEART RATE: 68 BPM | HEIGHT: 68 IN | SYSTOLIC BLOOD PRESSURE: 134 MMHG | DIASTOLIC BLOOD PRESSURE: 84 MMHG | BODY MASS INDEX: 29.55 KG/M2

## 2022-11-29 DIAGNOSIS — N32.3 DIVERTICULUM OF BLADDER: ICD-10-CM

## 2022-11-29 DIAGNOSIS — N30.01 ACUTE CYSTITIS WITH HEMATURIA: ICD-10-CM

## 2022-11-29 NOTE — PROGRESS NOTES
Telephone note- Pulmonary Medicine   Maribell Retana 78 y o  male MRN: [id number]    Reason for call:    Spoke to patient about concerns of shortness of breath  Mild increasing shortness of breath in comparison to last visit  He has gained a little bit of weight  He notices it with exertion  Denies chest pain, palpitations, fever chills, productive cough, or wheezing      Pertinent details:   at this point, he is coming in to allergy season and has taken Singulair in the past   I suggested that he resume his Singulair  He does have an albuterol inhaler at home and he will try the albuterol to see if this is beneficial     Otherwise will continue to observe symptoms    If steadily worsening, he will call me back but otherwise will anticipate seeing him for his regularly scheduled appointment in May    Mimi Roque MD Metronidazole Counseling:  I discussed with the patient the risks of metronidazole including but not limited to seizures, nausea/vomiting, a metallic taste in the mouth, nausea/vomiting and severe allergy.

## 2022-11-29 NOTE — PROGRESS NOTES
11/29/2022    Justice Hunger  1950  [id number]        Assessment  History BPH, status post TURP x2, status post robot assisted laparoscopic bladder diverticulectomy, neurogenic bladder requiring continued clean intermittent catheterization      Discussion   I had a lengthy discussion in the office today with cracking his wife regarding his bladder outlet as well as his recent surgery for robot assisted laparoscopic bladder diverticulectomy  Despite these measures he still continues to be in retention and requires clean intermittent catheterization multiple times per day  We did discuss discontinuing his daily dose suppression Keflex and return in 3 months time with a postvoid residual assessment and uroflow evaluation assuming that he is able to urinate  The patient is amenable with this plan and will call sooner if he has difficulty performing catheterization  History of Present Illness  81 y o  male with a history of BPH  He has undergone transurethral resection of the prostate x2  First was performed at [facility] and the 2nd in [place]  Both were performed more than 10 years ago  He has a known diverticulum of the bladder and was referred to [facility] for 2nd opinion  Ultimately he had urodynamics which showed adequate detrusor contraction and he ultimately underwent a robot assisted laparoscopic bladder diverticulectomy  He returns for routine follow-up today  He states that he continues to perform clean intermittent catheterization  AUA Symptom Score      Review of Systems  Review of Systems   Constitutional: Negative  HENT: Negative  Eyes: Negative  Respiratory: Negative  Cardiovascular: Negative  Gastrointestinal: Negative  Endocrine: Negative  Genitourinary:        Per HPI   Musculoskeletal: Negative  Skin: Negative  Allergic/Immunologic: Negative  Neurological: Negative  Hematological: Negative  Psychiatric/Behavioral: Negative  Past Medical History  Past Medical History:   Diagnosis Date   • Anxiety    • BPH (benign prostatic hypertrophy)    • Cancer (HCC)     CLL   • [facility] )     2009   • Colon polyp    • Concussion     Resolved: 08/22/16   • Depression    • Diverticulitis 1/13/2020 2014 CT done 11/19 12/19 CT done    • E coli bacteremia 6/27/2021    2/2 blood cultures with Elridge Darter appears to be the urine    • Epididymitis 5/19/2021 5/2021   • Gastrointestinal hemorrhage     Last assessed: 08/27/13   • GERD (gastroesophageal reflux disease)    • Hearing loss of aging    • Hiatal hernia    • History of transfusion    • Hyperlipidemia    • Hypertension    • Iron deficiency anemia    • Microscopic hematuria     Last assessed: 06/28/13   • OA (osteoarthritis)     right hip   • Pneumothorax    • Primary osteoarthritis of right hip 9/29/2017    He is status post right hip arthroplasty   • Prostatitis    • Pulmonary hypertension ([facility] )    • Seasonal allergies    • Urinary tract infection associated with catheterization of urinary tract ([place] [address] ) 2/5/2021    Pseudomonal UTI asymptomatic  Per Urology do not treat at this time  • Urinary tract infection associated with catheterization of urinary tract ([facility] ) 2/5/2021    Pseudomonal UTI asymptomatic  Per Urology do not treat at this time    He did have urosepsis from E coli 7/21   • UTI (urinary tract infection)     in past   • Wears glasses        Past Social History  Past Surgical History:   Procedure Laterality Date   • BLADDER SURGERY     • COLONOSCOPY     • CYSTOSCOPY  10/09/2014    Diagnostic   • CYSTOSCOPY  06/29/2020   • FL CYSTOGRAM  08/15/2022   • FRACTURE SURGERY      left lower arm   • FRACTURE SURGERY      left femur   • HERNIA REPAIR     • JOINT REPLACEMENT Right     hip   • OTHER SURGICAL HISTORY  03/2011    Spinal anesthesia epidural   • SD TOTAL HIP ARTHROPLASTY Right 09/29/2017    Procedure: ARTHROPLASTY HIP TOTAL ANTERIOR;  Surgeon: Lori Ross MD;  Location: [facility];  Service: Orthopedics   • TONSILLECTOMY AND ADENOIDECTOMY     • TRANSURETHRAL RESECTION OF PROSTATE      x 2   • WRIST SURGERY         Past Family History  Family History   Problem Relation Age of Onset   • No Known Problems Mother    • Heart attack Father    • Diabetes Brother    • Prostate cancer Brother        Past Social history  Social History     Socioeconomic History   • Marital status: /Civil Union     Spouse name: Not on file   • Number of children: Not on file   • Years of education: Not on file   • Highest education level: Not on file   Occupational History   • Not on file   Tobacco Use   • Smoking status: Former     Packs/day: 1 00     Years: 15      Pack years: 15      Types: Cigarettes     Start date:      Quit date: 1980     Years since quittin 2   • Smokeless tobacco: Never   Vaping Use   • Vaping Use: Never used   Substance and Sexual Activity   • Alcohol use: Yes     Alcohol/week: 10 0 standard drinks     Types: 10 Cans of beer per week   • Drug use: Yes     Types: Marijuana     Comment: "medical marijuana"   • Sexual activity: Not Currently   Other Topics Concern   • Not on file   Social History Narrative   • Not on file     Social Determinants of Health     Financial Resource Strain: Low Risk    • Difficulty of Paying Living Expenses: Not hard at all   Food Insecurity: Not on file   Transportation Needs: No Transportation Needs   • Lack of Transportation (Medical): No   • Lack of Transportation (Non-Medical):  No   Physical Activity: Not on file   Stress: Not on file   Social Connections: Not on file   Intimate Partner Violence: Not on file   Housing Stability: Not on file       Current Medications  Current Outpatient Medications   Medication Sig Dispense Refill   • Acetaminophen 500 MG Take 1,000 mg by mouth     • amLODIPine (NORVASC) 5 mg tablet Take 1 tablet (5 mg total) by mouth every evening 90 tablet 3   • amoxicillin (AMOXIL) 500 mg capsule TAKE 2 TABLETS BY MOUTH ONE hour prior TO dental vistit     • ascorbic acid (VITAMIN C) 250 MG CHEW Chew 250 mg 2 (two) times a day     • aspirin 81 MG tablet Take 1 tablet by mouth daily     • atorvastatin (LIPITOR) 20 mg tablet TAKE ONE TABLET BY MOUTH ONCE EVERY DAY 90 tablet 3   • Blood Pressure Monitoring (Omron 5 Series BP Monitor) PEE      • cephalexin (KEFLEX) 500 mg capsule TAKE ONE CAPSULE BY MOUTH EVERY 24 HOURS     • cetirizine (ZyrTEC) 10 mg tablet Take 10 mg by mouth as needed       • Docusate Sodium (DSS) 100 MG CAPS Take 100 mg by mouth 2 (two) times a day     • LACTOBACILLUS BIFIDUS PO Take 1 tablet by mouth daily with breakfast     • LORazepam (Ativan) 0 5 mg tablet Take 1 tablet (0 5 mg total) by mouth every 8 (eight) hours as needed for anxiety 30 tablet 0   • metoprolol tartrate (LOPRESSOR) 25 mg tablet TAKE ONE TABLET BY MOUTH EVERY TWELVE HOURS 180 tablet 3   • montelukast (SINGULAIR) 10 mg tablet TAKE ONE TABLET BY MOUTH DAILY 90 tablet 3   • Omega-3 Fatty Acids (FISH OIL) 1,000 mg Take by mouth daily     • omeprazole (PriLOSEC) 40 MG capsule Take one capsule by mouth once every day  90 capsule 3   • phenazopyridine (PYRIDIUM) 100 mg tablet Take 100 mg by mouth     • senna (SENOKOT) 8 6 MG tablet Take 2 tablets by mouth daily     • sertraline (ZOLOFT) 50 mg tablet TAKE ONE TABLET BY MOUTH EVERY DAY 90 tablet 3   • vitamin B-12 (CYANOCOBALAMIN) 500 MCG TABS Take 1 tablet (500 mcg total) by mouth daily 30 tablet 0     No current facility-administered medications for this visit  Allergies  Allergies   Allergen Reactions   • Codeine Other (See Comments)     agitated   • Sulfamethoxazole-Trimethoprim GI Intolerance and Abdominal Pain     upset stomach       Past Medical History, Social History, Family History, medications and allergies were reviewed      Vitals  Vitals:    11/29/22 1151   BP: 134/84   BP Location: Left arm   Patient Position: Sitting   Cuff Size: Adult Pulse: 68   SpO2: 100%   Weight: 88 5 kg (195 lb)   Height: 5' 8" (1 727 m)       Physical Exam  Physical Exam  On examination he is in no acute distress    Gait normal   Affect normal      Results  Lab Results   Component Value Date    PSA 0 4 07/29/2021    PSA 0 4 01/27/2021    PSA 0 2 01/30/2020     Lab Results   Component Value Date    GLUCOSE 130 01/10/2021    CALCIUM 9 6 10/11/2022     (L) 07/20/2015    K 3 8 10/11/2022    CO2 27 10/11/2022     10/11/2022    BUN 6 10/11/2022    CREATININE 0 80 10/11/2022     Lab Results   Component Value Date    WBC 18 20 (H) 10/11/2022    HGB 12 4 10/11/2022    HCT 38 5 10/11/2022     (H) 10/11/2022     10/11/2022         Office Urine Dip  No results found for this or any previous visit (from the past 1 hour(s)) ]

## 2023-01-17 ENCOUNTER — OFFICE VISIT (OUTPATIENT)
Dept: PULMONOLOGY | Facility: CLINIC | Age: 73
End: 2023-01-17

## 2023-01-17 VITALS
OXYGEN SATURATION: 97 % | HEART RATE: 63 BPM | SYSTOLIC BLOOD PRESSURE: 136 MMHG | WEIGHT: 196 LBS | HEIGHT: 68 IN | DIASTOLIC BLOOD PRESSURE: 72 MMHG | BODY MASS INDEX: 29.7 KG/M2

## 2023-01-17 DIAGNOSIS — I27.20 PULMONARY HYPERTENSION (HCC): Primary | Chronic | ICD-10-CM

## 2023-01-17 NOTE — PROGRESS NOTES
Progress note - Pulmonary Medicine   Brando Woodruff 81 y o  male MRN: [id number]       Impression & Plan:     Pulmonary hypertension ([address] )  · No new symptoms  · Does report occasional fatigue with exertion but no justo shortness of breath  No leg swelling or signs or symptoms of decompensated right heart failure  · Continue to observe symptoms  Continue  6-month follow-up  ______________________________________________________________________    HPI:    Brando Woodruff presents today for follow-up of pulmonary hypertension by echocardiography  This is likely multifactorial due to element of mild restriction as well as untreated sleep apnea  He has not had any significant symptoms  He denies any leg swelling  No abdominal bloating  No signs or symptoms of decompensated congestive heart failure  He does report some fatigue with exercise which is likely due to a combination of weight and cardiovascular deconditioning  He does not routinely exercise  He does not however report substantial shortness of breath either  No cough, wheezing, chest tightness, or palpitations  He did have bladder surgery in the early fall  This did not help with his need for straight catheterization which he was hoping it would      Current Medications:    Current Outpatient Medications:   •  Acetaminophen 500 MG, Take 1,000 mg by mouth prn, Disp: , Rfl:   •  amLODIPine (NORVASC) 5 mg tablet, Take 1 tablet (5 mg total) by mouth every evening, Disp: 90 tablet, Rfl: 3  •  ascorbic acid (VITAMIN C) 250 MG CHEW, Chew 250 mg 2 (two) times a day, Disp: , Rfl:   •  aspirin 81 MG tablet, Take 1 tablet by mouth daily, Disp: , Rfl:   •  atorvastatin (LIPITOR) 20 mg tablet, TAKE ONE TABLET BY MOUTH ONCE EVERY DAY, Disp: 90 tablet, Rfl: 3  •  Blood Pressure Monitoring (Omron 5 Series BP Monitor) PEE, , Disp: , Rfl:   •  cetirizine (ZyrTEC) 10 mg tablet, Take 10 mg by mouth as needed  , Disp: , Rfl:   •  LORazepam (Ativan) 0 5 mg tablet, Take 1 tablet (0 5 mg total) by mouth every 8 (eight) hours as needed for anxiety, Disp: 30 tablet, Rfl: 0  •  metoprolol tartrate (LOPRESSOR) 25 mg tablet, TAKE ONE TABLET BY MOUTH EVERY TWELVE HOURS, Disp: 180 tablet, Rfl: 3  •  montelukast (SINGULAIR) 10 mg tablet, TAKE ONE TABLET BY MOUTH DAILY, Disp: 90 tablet, Rfl: 3  •  Omega-3 Fatty Acids (FISH OIL) 1,000 mg, Take by mouth daily, Disp: , Rfl:   •  omeprazole (PriLOSEC) 40 MG capsule, Take one capsule by mouth once every day , Disp: 90 capsule, Rfl: 3  •  sertraline (ZOLOFT) 50 mg tablet, TAKE ONE TABLET BY MOUTH EVERY DAY, Disp: 90 tablet, Rfl: 3  •  vitamin B-12 (CYANOCOBALAMIN) 500 MCG TABS, Take 1 tablet (500 mcg total) by mouth daily, Disp: 30 tablet, Rfl: 0    Review of Systems:  Aside from what is mentioned in the HPI, the review of systems is otherwise negative    Past medical history, surgical history, and family history were reviewed and updated as appropriate    Social history updates:  Social History     Tobacco Use   Smoking Status Former   • Packs/day: 1 00   • Years: 15 00   • Pack years: 15 00   • Types: Cigarettes   • Start date: 67   • Quit date: 1980   • Years since quittin 3   Smokeless Tobacco Never       PhysicalExamination:  Vitals:   /72 (BP Location: Left arm, Patient Position: Sitting, Cuff Size: Large)   Pulse 63   Ht 5' 8" (1 727 m)   Wt 88 9 kg (196 lb)   SpO2 97%   BMI 29 80 kg/m²     Gen: Overweight  Comfortable on room air  No conversational dyspnea  HEENT:  Conjugate gaze  sclerae anicteric  Oropharynx moist  Neck: Trachea is midline  No JVD  No adenopathy  Chest: Mildly limited chest wall excursion but breath sounds are clear bilaterally  Cardiac: Regular  no murmur  Abdomen:  benign  Extremities: No edema  Neuro:  Normal speech and mentation    Diagnostic Data:  Labs:   I personally reviewed the most recent laboratory data pertinent to today's visit    Lab Results   Component Value Date    WBC 18 20 (H) 10/11/2022    HGB 12 4 10/11/2022    HCT 38 5 10/11/2022     (H) 10/11/2022     10/11/2022     Lab Results   Component Value Date    SODIUM 134 (L) 10/11/2022    K 3 8 10/11/2022    CO2 27 10/11/2022     10/11/2022    BUN 6 10/11/2022    CREATININE 0 80 10/11/2022    CALCIUM 9 6 10/11/2022       Echocardiogram:  Recent echocardiogram was in June 2022  This did demonstrate moderate pulmonary hypertension with an estimated pulmonary artery pressure 55 to 60 mmHg    He did have moderate aortic valve regurgitation and left ventricular hypertrophy with diastolic dysfunction and mild mitral regurgitation    Ml Ling MD

## 2023-01-17 NOTE — ASSESSMENT & PLAN NOTE
· No new symptoms  · Does report occasional fatigue with exertion but no justo shortness of breath  No leg swelling or signs or symptoms of decompensated right heart failure  · Continue to observe symptoms

## 2023-01-26 ENCOUNTER — TELEPHONE (OUTPATIENT)
Dept: UROLOGY | Facility: AMBULATORY SURGERY CENTER | Age: 73
End: 2023-01-26

## 2023-01-26 NOTE — TELEPHONE ENCOUNTER
Pt called the office stated that he does not want earlier appt he wants AM appt late in the month     Rescheduled to 4/7/23 at 8:30

## 2023-01-26 NOTE — TELEPHONE ENCOUNTER
Pt called and he is not happy that his appointment has been changed with Dr Juhi Castillo to 2:30 on 03/03/23 since no one called him and he doesn't like driving that late in the day  He would like to know if there is any other day earlier in time that is not out three months  He will keep the appointment for now but wants a different time if possible       Pt can be reached at [phone]

## 2023-02-14 ENCOUNTER — TELEPHONE (OUTPATIENT)
Dept: GASTROENTEROLOGY | Facility: CLINIC | Age: 73
End: 2023-02-14

## 2023-02-14 NOTE — TELEPHONE ENCOUNTER
----- Message from Lori Sierra MD sent at 2/13/2023  4:35 PM EST -----  Regarding: Reschedule office appt  Please reschedule his office appointment with me  He said he had to cancel last Friday  Please schedule him with me within the next 4 weeks  Ok to [employer] at [employer] or lunch time

## 2023-03-01 DIAGNOSIS — U07.1 COVID-19: ICD-10-CM

## 2023-03-01 DIAGNOSIS — K21.9 GASTROESOPHAGEAL REFLUX DISEASE: ICD-10-CM

## 2023-03-01 RX ORDER — OMEPRAZOLE 40 MG/1
CAPSULE, DELAYED RELEASE ORAL
Qty: 90 CAPSULE | Refills: 3 | Status: ON HOLD | OUTPATIENT
Start: 2023-03-01

## 2023-03-02 ENCOUNTER — OFFICE VISIT (OUTPATIENT)
Dept: GASTROENTEROLOGY | Facility: CLINIC | Age: 73
End: 2023-03-02

## 2023-03-02 VITALS
SYSTOLIC BLOOD PRESSURE: 140 MMHG | BODY MASS INDEX: 29.7 KG/M2 | WEIGHT: 196 LBS | TEMPERATURE: 99.7 F | DIASTOLIC BLOOD PRESSURE: 80 MMHG | HEIGHT: 68 IN

## 2023-03-02 DIAGNOSIS — D50.9 IRON DEFICIENCY ANEMIA, UNSPECIFIED IRON DEFICIENCY ANEMIA TYPE: Chronic | ICD-10-CM

## 2023-03-02 DIAGNOSIS — K63.5 POLYP OF COLON, UNSPECIFIED PART OF COLON, UNSPECIFIED TYPE: Primary | ICD-10-CM

## 2023-03-02 DIAGNOSIS — K31.819 GAVE (GASTRIC ANTRAL VASCULAR ECTASIA): ICD-10-CM

## 2023-03-02 DIAGNOSIS — K21.9 GASTROESOPHAGEAL REFLUX DISEASE WITHOUT ESOPHAGITIS: Chronic | ICD-10-CM

## 2023-03-02 NOTE — PROGRESS NOTES
[facility] - Outpatient Follow-up Note  Amy Herrera 81 y o  male MRN: [id number]  Encounter: [id number]          ASSESSMENT AND PLAN:      1  Polyp of colon, unspecified part of colon, unspecified type  He has a history of colon polyps and is due for surveillance colonoscopy so we will schedule this for him at the [facility] per his preference  2  Gastroesophageal reflux disease without esophagitis  He has a history of reflux and should continue the omeprazole daily as it is helping  3  GAVE (gastric antral vascular ectasia)  4  Iron deficiency anemia, unspecified iron deficiency anemia type  He has a history of gastric antral vascular ectasia causing his iron deficiency anemia  If his next set of labs in April show worsening of his iron or hemoglobin that we could consider repeat upper endoscopy to retreat his gastric antral vascular ectasia     ______________________________________________________________________    SUBJECTIVE: He presents for follow-up of his history of colon polyps, his reflux, and his gastric antral vascular ectasia with iron deficiency anemia  Fortunately his reflux has been well controlled with omeprazole daily  He has a history of colon polyps and is due for repeat surveillance colonoscopy now  He has not had any bleeding, diarrhea, constipation, abdominal pain, or weight loss  He has a history of gastric antral vascular ectasia which was felt to be the cause of his iron deficiency anemia  This was treated with argon plasma coagulation and his hemoglobin and iron studies have improved  REVIEW OF SYSTEMS IS OTHERWISE NEGATIVE        Historical Information   Past Medical History:   Diagnosis Date   • Anxiety    • BPH (benign prostatic hypertrophy)    • Cancer (HCC)     CLL   • CLL (chronic lymphocytic leukemia) ([facility] )     2009   • Colon polyp    • Concussion     Resolved: 08/22/16   • Depression    • Diverticulitis 1/13/2020 2014 CT done 11/19 12/19 CT done    • E coli bacteremia 6/27/2021    2/2 [facility] appears to be the urine    • Epididymitis 5/19/2021 5/2021   • Gastrointestinal hemorrhage     Last assessed: 08/27/13   • GERD (gastroesophageal reflux disease)    • Hearing loss of aging    • Hiatal hernia    • History of transfusion    • Hyperlipidemia    • Hypertension    • Iron deficiency anemia    • Microscopic hematuria     Last assessed: 06/28/13   • OA (osteoarthritis)     right hip   • Pneumothorax    • Primary osteoarthritis of right hip 9/29/2017    He is status post right hip arthroplasty   • Prostatitis    • Pulmonary hypertension (HCC)    • Seasonal allergies    • Urinary tract infection associated with catheterization of urinary tract ([facility] ) 2/5/2021    Pseudomonal UTI asymptomatic  Per Urology do not treat at this time  • Urinary tract infection associated with catheterization of urinary tract ([facility] ) 2/5/2021    Pseudomonal UTI asymptomatic  Per Urology do not treat at this time    He did have urosepsis from E coli 7/21   • UTI (urinary tract infection)     in past   • Wears glasses      Past Surgical History:   Procedure Laterality Date   • BLADDER SURGERY     • COLONOSCOPY     • CYSTOSCOPY  10/09/2014    Diagnostic   • CYSTOSCOPY  06/29/2020   • FL CYSTOGRAM  08/15/2022   • FRACTURE SURGERY      left lower arm   • FRACTURE SURGERY      left femur   • HERNIA REPAIR     • JOINT REPLACEMENT Right     hip   • OTHER SURGICAL HISTORY  03/2011    Spinal anesthesia epidural   • [place] ARTHRP ACETBLR/PROX FEM PROSTC AGRFT/ALGRFT Right 09/29/2017    Procedure: ARTHROPLASTY HIP TOTAL ANTERIOR;  Surgeon: Colletta Bouquet, MD;  Location: [facility];  Service: Orthopedics   • TONSILLECTOMY AND ADENOIDECTOMY     • TRANSURETHRAL RESECTION OF PROSTATE      x 2   • WRIST SURGERY       Social History   Social History     Substance and Sexual Activity   Alcohol Use Yes   • Alcohol/week: 10 0 standard drinks   • Types: 10 Cans of beer per week     Social History     Substance and Sexual Activity   Drug Use Yes   • Types: Marijuana    Comment: "medical marijuana"     Social History     Tobacco Use   Smoking Status Former   • Packs/day: 1 00   • Years: 15 00   • Pack years: 15 00   • Types: Cigarettes   • Start date: 67   • Quit date: 1980   • Years since quittin 5   Smokeless Tobacco Never     Family History   Problem Relation Age of Onset   • No Known Problems Mother    • Heart attack Father    • Diabetes Brother    • Prostate cancer Brother        Meds/Allergies       Current Outpatient Medications:   •  Acetaminophen 500 MG  •  amLODIPine (NORVASC) 5 mg tablet  •  ascorbic acid (VITAMIN C) 250 MG CHEW  •  aspirin 81 MG tablet  •  atorvastatin (LIPITOR) 20 mg tablet  •  Blood Pressure Monitoring (Omron 5 Series BP Monitor) PEE  •  cetirizine (ZyrTEC) 10 mg tablet  •  LORazepam (Ativan) 0 5 mg tablet  •  metoprolol tartrate (LOPRESSOR) 25 mg tablet  •  montelukast (SINGULAIR) 10 mg tablet  •  Omega-3 Fatty Acids (FISH OIL) 1,000 mg  •  omeprazole (PriLOSEC) 40 MG capsule  •  sertraline (ZOLOFT) 50 mg tablet  •  vitamin B-12 (CYANOCOBALAMIN) 500 MCG TABS    Allergies   Allergen Reactions   • Codeine Other (See Comments)     agitated   • Sulfamethoxazole-Trimethoprim GI Intolerance and Abdominal Pain     upset stomach           Objective     Blood pressure 140/80, temperature 99 7 °F (37 6 °C), temperature source Tympanic, height 5' 8" (1 727 m), weight 88 9 kg (196 lb)  Body mass index is 29 8 kg/m²  PHYSICAL EXAM:      General Appearance:   Alert, cooperative, no distress   HEENT:   Normocephalic, atraumatic, anicteric      Neck:  Supple, symmetrical, trachea midline   Lungs:   Clear to auscultation bilaterally; no rales, rhonchi or wheezing; respirations unlabored    Heart[de-identified]   Regular rate and rhythm; no murmur, rub, or gallop     Abdomen:   Soft, non-tender, non-distended; normal bowel sounds; no masses, no organomegaly    Genitalia:   Deferred    Rectal:   Deferred    Extremities:  No cyanosis, clubbing or edema    Pulses:  2+ and symmetric    Skin:  No jaundice, rashes, or lesions    Lymph nodes:  No palpable cervical lymphadenopathy        Lab Results:   No visits with results within 1 Day(s) from this visit     Latest known visit with results is:   Appointment on 10/11/2022   Component Date Value   • WBC 10/11/2022 18 20 (H)    • RBC 10/11/2022 3 72 (L)    • Hemoglobin 10/11/2022 12 4    • Hematocrit 10/11/2022 38 5    • MCV 10/11/2022 104 (H)    • MCH 10/11/2022 33 3    • MCHC 10/11/2022 32 2    • RDW 10/11/2022 14 8    • MPV 10/11/2022 10 4    • Platelets 18/31/2711 286    • nRBC 10/11/2022 0    • Neutrophils Relative 10/11/2022 15 (L)    • Immat GRANS % 10/11/2022 0    • Lymphocytes Relative 10/11/2022 83 (H)    • Monocytes Relative 10/11/2022 1 (L)    • Eosinophils Relative 10/11/2022 1    • Basophils Relative 10/11/2022 0    • Neutrophils Absolute 10/11/2022 2 65    • Immature Grans Absolute 10/11/2022 0 05    • Lymphocytes Absolute 10/11/2022 15 13 (H)    • Monocytes Absolute 10/11/2022 0 23    • Eosinophils Absolute 10/11/2022 0 11    • Basophils Absolute 10/11/2022 0 03    • Sodium 10/11/2022 134 (L)    • Potassium 10/11/2022 3 8    • Chloride 10/11/2022 101    • CO2 10/11/2022 27    • ANION GAP 10/11/2022 6    • BUN 10/11/2022 6    • Creatinine 10/11/2022 0 80    • Glucose, Fasting 10/11/2022 135 (H)    • Calcium 10/11/2022 9 6    • AST 10/11/2022 28    • ALT 10/11/2022 43    • Alkaline Phosphatase 10/11/2022 109    • Total Protein 10/11/2022 8 6 (H)    • Albumin 10/11/2022 4 4    • Total Bilirubin 10/11/2022 0 69    • eGFR 10/11/2022 90    • LD 10/11/2022 183    • Cholesterol 10/11/2022 134    • Triglycerides 10/11/2022 110    • HDL, Direct 10/11/2022 37 (L)    • LDL Calculated 10/11/2022 75    • Hemoglobin A1C 10/11/2022 5 5    • EAG 10/11/2022 111    • TSH 3RD GENERATON 10/11/2022 1 080 Radiology Results:   No results found

## 2023-03-02 NOTE — PATIENT INSTRUCTIONS
Scheduled date of colonoscopy (as of today): 04/12/2023  Physician performing colonoscopy: Dr Dykes Houlton   Location of colonoscopy: [facility]  Bowel prep reviewed with patient: Clenpiq  Instructions reviewed with patient by: Artis Weinberg   Clearances:  N/A

## 2023-03-06 ENCOUNTER — APPOINTMENT (EMERGENCY)
Dept: RADIOLOGY | Facility: HOSPITAL | Age: 73
End: 2023-03-06

## 2023-03-06 ENCOUNTER — HOSPITAL ENCOUNTER (INPATIENT)
Facility: HOSPITAL | Age: 73
LOS: 1 days | End: 2023-03-07
Attending: EMERGENCY MEDICINE | Admitting: INTERNAL MEDICINE

## 2023-03-06 ENCOUNTER — APPOINTMENT (EMERGENCY)
Dept: CT IMAGING | Facility: HOSPITAL | Age: 73
End: 2023-03-06

## 2023-03-06 DIAGNOSIS — R79.89 ELEVATED BRAIN NATRIURETIC PEPTIDE (BNP) LEVEL: ICD-10-CM

## 2023-03-06 DIAGNOSIS — D72.829 LEUKOCYTOSIS: ICD-10-CM

## 2023-03-06 DIAGNOSIS — R56.9 SEIZURE (HCC): ICD-10-CM

## 2023-03-06 DIAGNOSIS — R50.9 FEVER: Primary | ICD-10-CM

## 2023-03-06 DIAGNOSIS — N39.0 URINARY TRACT INFECTION: ICD-10-CM

## 2023-03-06 DIAGNOSIS — R79.89 ELEVATED LACTIC ACID LEVEL: ICD-10-CM

## 2023-03-06 DIAGNOSIS — I49.01 VENTRICULAR FIBRILLATION (HCC): ICD-10-CM

## 2023-03-06 PROBLEM — A41.9 SEPSIS (HCC): Status: ACTIVE | Noted: 2023-03-06

## 2023-03-06 PROBLEM — I49.9 ARRHYTHMIA: Status: ACTIVE | Noted: 2023-03-06

## 2023-03-06 PROBLEM — E87.1 HYPONATREMIA: Status: ACTIVE | Noted: 2023-03-06

## 2023-03-06 LAB
2HR DELTA HS TROPONIN: 59 NG/L
4HR DELTA HS TROPONIN: 154 NG/L
ALBUMIN SERPL BCP-MCNC: 4.9 G/DL (ref 3.5–5)
ALP SERPL-CCNC: 88 U/L (ref 34–104)
ALT SERPL W P-5'-P-CCNC: 39 U/L (ref 7–52)
AMMONIA PLAS-SCNC: 57 UMOL/L (ref 18–72)
AMPHETAMINES SERPL QL SCN: NEGATIVE
ANION GAP SERPL CALCULATED.3IONS-SCNC: 15 MMOL/L (ref 4–13)
APAP SERPL-MCNC: <10 UG/ML (ref 10–20)
APTT PPP: 26 SECONDS (ref 23–37)
AST SERPL W P-5'-P-CCNC: 49 U/L (ref 13–39)
ATRIAL RATE: 105 BPM
ATRIAL RATE: 147 BPM
ATRIAL RATE: 192 BPM
ATRIAL RATE: 197 BPM
ATRIAL RATE: 65 BPM
ATRIAL RATE: 66 BPM
BACTERIA UR QL AUTO: ABNORMAL /HPF
BARBITURATES UR QL: NEGATIVE
BASOPHILS # BLD MANUAL: 0 THOUSAND/UL (ref 0–0.1)
BASOPHILS NFR MAR MANUAL: 0 % (ref 0–1)
BENZODIAZ UR QL: NEGATIVE
BILIRUB SERPL-MCNC: 1.01 MG/DL (ref 0.2–1)
BILIRUB UR QL STRIP: NEGATIVE
BNP SERPL-MCNC: 565 PG/ML (ref 0–100)
BUN SERPL-MCNC: 10 MG/DL (ref 5–25)
CALCIUM SERPL-MCNC: 9.4 MG/DL (ref 8.4–10.2)
CARDIAC TROPONIN I PNL SERPL HS: 15 NG/L
CARDIAC TROPONIN I PNL SERPL HS: 169 NG/L
CARDIAC TROPONIN I PNL SERPL HS: 74 NG/L
CHLORIDE SERPL-SCNC: 98 MMOL/L (ref 96–108)
CK SERPL-CCNC: 38 U/L (ref 39–308)
CLARITY UR: ABNORMAL
CO2 SERPL-SCNC: 20 MMOL/L (ref 21–32)
COCAINE UR QL: NEGATIVE
COLOR UR: YELLOW
CREAT SERPL-MCNC: 0.96 MG/DL (ref 0.6–1.3)
EOSINOPHIL # BLD MANUAL: 0 THOUSAND/UL (ref 0–0.4)
EOSINOPHIL NFR BLD MANUAL: 0 % (ref 0–6)
ERYTHROCYTE [DISTWIDTH] IN BLOOD BY AUTOMATED COUNT: 13.9 % (ref 11.6–15.1)
ETHANOL SERPL-MCNC: <10 MG/DL
FLUAV RNA RESP QL NAA+PROBE: NEGATIVE
FLUBV RNA RESP QL NAA+PROBE: NEGATIVE
GFR SERPL CREATININE-BSD FRML MDRD: 79 ML/MIN/1.73SQ M
GLUCOSE SERPL-MCNC: 234 MG/DL (ref 65–140)
GLUCOSE UR STRIP-MCNC: NEGATIVE MG/DL
HCT VFR BLD AUTO: 38.6 % (ref 36.5–49.3)
HGB BLD-MCNC: 13.2 G/DL (ref 12–17)
HGB UR QL STRIP.AUTO: ABNORMAL
INR PPP: 1.25 (ref 0.84–1.19)
KETONES UR STRIP-MCNC: NEGATIVE MG/DL
LACTATE SERPL-SCNC: 3.5 MMOL/L (ref 0.5–2)
LACTATE SERPL-SCNC: 5.4 MMOL/L (ref 0.5–2)
LEUKOCYTE ESTERASE UR QL STRIP: ABNORMAL
LYMPHOCYTES # BLD AUTO: 33.78 THOUSAND/UL (ref 0.6–4.47)
LYMPHOCYTES # BLD AUTO: 68 % (ref 14–44)
MAGNESIUM SERPL-MCNC: 1.9 MG/DL (ref 1.9–2.7)
MCH RBC QN AUTO: 36.1 PG (ref 26.8–34.3)
MCHC RBC AUTO-ENTMCNC: 34.2 G/DL (ref 31.4–37.4)
MCV RBC AUTO: 106 FL (ref 82–98)
METHADONE UR QL: NEGATIVE
MONOCYTES # BLD AUTO: 0 THOUSAND/UL (ref 0–1.22)
MONOCYTES NFR BLD: 0 % (ref 4–12)
NEUTROPHILS # BLD MANUAL: 14.41 THOUSAND/UL (ref 1.85–7.62)
NEUTS BAND NFR BLD MANUAL: 8 % (ref 0–8)
NEUTS SEG NFR BLD AUTO: 21 % (ref 43–75)
NITRITE UR QL STRIP: NEGATIVE
NON-SQ EPI CELLS URNS QL MICRO: ABNORMAL /HPF
OPIATES UR QL SCN: NEGATIVE
OXYCODONE+OXYMORPHONE UR QL SCN: NEGATIVE
P AXIS: -53 DEGREES
P AXIS: -63 DEGREES
P AXIS: 86 DEGREES
PCP UR QL: NEGATIVE
PH UR STRIP.AUTO: 6 [PH]
PHOSPHATE SERPL-MCNC: 5.1 MG/DL (ref 2.3–4.1)
PLATELET # BLD AUTO: 228 THOUSANDS/UL (ref 149–390)
PLATELET BLD QL SMEAR: ADEQUATE
PMV BLD AUTO: 9.7 FL (ref 8.9–12.7)
POTASSIUM SERPL-SCNC: 4.1 MMOL/L (ref 3.5–5.3)
PR INTERVAL: 208 MS
PROCALCITONIN SERPL-MCNC: 0.05 NG/ML
PROT SERPL-MCNC: 8.3 G/DL (ref 6.4–8.4)
PROT UR STRIP-MCNC: ABNORMAL MG/DL
PROTHROMBIN TIME: 15.6 SECONDS (ref 11.6–14.5)
QRS AXIS: 65 DEGREES
QRS AXIS: 69 DEGREES
QRS AXIS: 73 DEGREES
QRS AXIS: 74 DEGREES
QRS AXIS: 78 DEGREES
QRS AXIS: 80 DEGREES
QRSD INTERVAL: 104 MS
QRSD INTERVAL: 86 MS
QRSD INTERVAL: 88 MS
QRSD INTERVAL: 94 MS
QRSD INTERVAL: 96 MS
QRSD INTERVAL: 96 MS
QT INTERVAL: 360 MS
QT INTERVAL: 416 MS
QT INTERVAL: 416 MS
QT INTERVAL: 480 MS
QT INTERVAL: 550 MS
QT INTERVAL: 563 MS
QTC INTERVAL: 411 MS
QTC INTERVAL: 416 MS
QTC INTERVAL: 453 MS
QTC INTERVAL: 523 MS
QTC INTERVAL: 532 MS
QTC INTERVAL: 586 MS
RBC # BLD AUTO: 3.66 MILLION/UL (ref 3.88–5.62)
RBC #/AREA URNS AUTO: ABNORMAL /HPF
RBC MORPH BLD: NORMAL
RSV RNA RESP QL NAA+PROBE: NEGATIVE
SALICYLATES SERPL-MCNC: <5 MG/DL (ref 3–20)
SARS-COV-2 RNA RESP QL NAA+PROBE: NEGATIVE
SODIUM SERPL-SCNC: 133 MMOL/L (ref 135–147)
SP GR UR STRIP.AUTO: 1.02 (ref 1–1.03)
T WAVE AXIS: -38 DEGREES
T WAVE AXIS: 119 DEGREES
T WAVE AXIS: 141 DEGREES
T WAVE AXIS: 144 DEGREES
T WAVE AXIS: 158 DEGREES
T WAVE AXIS: 82 DEGREES
THC UR QL: POSITIVE
TSH SERPL DL<=0.05 MIU/L-ACNC: 1.75 UIU/ML (ref 0.45–4.5)
UROBILINOGEN UR QL STRIP.AUTO: 0.2 E.U./DL
VARIANT LYMPHS # BLD AUTO: 3 %
VENTRICULAR RATE: 127 BPM
VENTRICULAR RATE: 41 BPM
VENTRICULAR RATE: 59 BPM
VENTRICULAR RATE: 60 BPM
VENTRICULAR RATE: 65 BPM
VENTRICULAR RATE: 74 BPM
WBC # BLD AUTO: 49.68 THOUSAND/UL (ref 4.31–10.16)
WBC #/AREA URNS AUTO: ABNORMAL /HPF

## 2023-03-06 PROCEDURE — B211YZZ FLUOROSCOPY OF MULTIPLE CORONARY ARTERIES USING OTHER CONTRAST: ICD-10-PCS | Performed by: INTERNAL MEDICINE

## 2023-03-06 PROCEDURE — 4A023N7 MEASUREMENT OF CARDIAC SAMPLING AND PRESSURE, LEFT HEART, PERCUTANEOUS APPROACH: ICD-10-PCS | Performed by: INTERNAL MEDICINE

## 2023-03-06 RX ORDER — ADENOSINE 3 MG/ML
6 INJECTION INTRAVENOUS ONCE
Status: COMPLETED | OUTPATIENT
Start: 2023-03-06 | End: 2023-03-06

## 2023-03-06 RX ORDER — LANOLIN ALCOHOL/MO/W.PET/CERES
6 CREAM (GRAM) TOPICAL
Status: DISCONTINUED | OUTPATIENT
Start: 2023-03-06 | End: 2023-03-07 | Stop reason: HOSPADM

## 2023-03-06 RX ORDER — FOLIC ACID 1 MG/1
1 TABLET ORAL DAILY
Status: DISCONTINUED | OUTPATIENT
Start: 2023-03-07 | End: 2023-03-07 | Stop reason: HOSPADM

## 2023-03-06 RX ORDER — LANOLIN ALCOHOL/MO/W.PET/CERES
100 CREAM (GRAM) TOPICAL DAILY
Status: DISCONTINUED | OUTPATIENT
Start: 2023-03-07 | End: 2023-03-07 | Stop reason: HOSPADM

## 2023-03-06 RX ORDER — ENOXAPARIN SODIUM 100 MG/ML
40 INJECTION SUBCUTANEOUS DAILY
Status: DISCONTINUED | OUTPATIENT
Start: 2023-03-07 | End: 2023-03-07 | Stop reason: HOSPADM

## 2023-03-06 RX ORDER — CEFTRIAXONE 1 G/50ML
1000 INJECTION, SOLUTION INTRAVENOUS ONCE
Status: COMPLETED | OUTPATIENT
Start: 2023-03-06 | End: 2023-03-06

## 2023-03-06 RX ORDER — SODIUM CHLORIDE, SODIUM GLUCONATE, SODIUM ACETATE, POTASSIUM CHLORIDE, MAGNESIUM CHLORIDE, SODIUM PHOSPHATE, DIBASIC, AND POTASSIUM PHOSPHATE .53; .5; .37; .037; .03; .012; .00082 G/100ML; G/100ML; G/100ML; G/100ML; G/100ML; G/100ML; G/100ML
1000 INJECTION, SOLUTION INTRAVENOUS ONCE
Status: COMPLETED | OUTPATIENT
Start: 2023-03-06 | End: 2023-03-06

## 2023-03-06 RX ORDER — ATORVASTATIN CALCIUM 20 MG/1
20 TABLET, FILM COATED ORAL
Status: DISCONTINUED | OUTPATIENT
Start: 2023-03-06 | End: 2023-03-07 | Stop reason: HOSPADM

## 2023-03-06 RX ORDER — ASPIRIN 81 MG/1
81 TABLET ORAL DAILY
Status: DISCONTINUED | OUTPATIENT
Start: 2023-03-07 | End: 2023-03-07

## 2023-03-06 RX ORDER — EPINEPHRINE 0.1 MG/ML
1 SYRINGE (ML) INJECTION ONCE
Status: COMPLETED | OUTPATIENT
Start: 2023-03-06 | End: 2023-03-06

## 2023-03-06 RX ORDER — LIDOCAINE HCL 20 MG/ML
1 SYRINGE (ML) INTRAVENOUS ONCE
Status: COMPLETED | OUTPATIENT
Start: 2023-03-06 | End: 2023-03-06

## 2023-03-06 RX ORDER — LORAZEPAM 2 MG/ML
2 INJECTION INTRAMUSCULAR ONCE
Status: COMPLETED | OUTPATIENT
Start: 2023-03-06 | End: 2023-03-06

## 2023-03-06 RX ORDER — MONTELUKAST SODIUM 10 MG/1
10 TABLET ORAL
Status: DISCONTINUED | OUTPATIENT
Start: 2023-03-06 | End: 2023-03-07 | Stop reason: HOSPADM

## 2023-03-06 RX ORDER — LORAZEPAM 2 MG/ML
INJECTION INTRAMUSCULAR
Status: COMPLETED
Start: 2023-03-06 | End: 2023-03-06

## 2023-03-06 RX ORDER — MAGNESIUM SULFATE HEPTAHYDRATE 40 MG/ML
INJECTION, SOLUTION INTRAVENOUS
Status: COMPLETED
Start: 2023-03-06 | End: 2023-03-06

## 2023-03-06 RX ORDER — PANTOPRAZOLE SODIUM 40 MG/1
40 TABLET, DELAYED RELEASE ORAL
Status: DISCONTINUED | OUTPATIENT
Start: 2023-03-07 | End: 2023-03-07 | Stop reason: HOSPADM

## 2023-03-06 RX ORDER — CEFTRIAXONE 1 G/50ML
1000 INJECTION, SOLUTION INTRAVENOUS EVERY 24 HOURS
Status: DISCONTINUED | OUTPATIENT
Start: 2023-03-07 | End: 2023-03-07 | Stop reason: HOSPADM

## 2023-03-06 RX ORDER — ACETAMINOPHEN 650 MG/1
650 SUPPOSITORY RECTAL ONCE
Status: COMPLETED | OUTPATIENT
Start: 2023-03-06 | End: 2023-03-06

## 2023-03-06 RX ORDER — KETOROLAC TROMETHAMINE 30 MG/ML
15 INJECTION, SOLUTION INTRAMUSCULAR; INTRAVENOUS ONCE
Status: COMPLETED | OUTPATIENT
Start: 2023-03-06 | End: 2023-03-06

## 2023-03-06 RX ORDER — MAGNESIUM SULFATE HEPTAHYDRATE 40 MG/ML
2 INJECTION, SOLUTION INTRAVENOUS ONCE
Status: COMPLETED | OUTPATIENT
Start: 2023-03-06 | End: 2023-03-06

## 2023-03-06 RX ORDER — ACETAMINOPHEN 325 MG/1
650 TABLET ORAL EVERY 6 HOURS PRN
Status: DISCONTINUED | OUTPATIENT
Start: 2023-03-06 | End: 2023-03-07 | Stop reason: HOSPADM

## 2023-03-06 RX ORDER — LORAZEPAM 2 MG/ML
1 INJECTION INTRAMUSCULAR EVERY 4 HOURS PRN
Status: DISCONTINUED | OUTPATIENT
Start: 2023-03-06 | End: 2023-03-07

## 2023-03-06 RX ADMIN — KETOROLAC TROMETHAMINE 15 MG: 30 INJECTION, SOLUTION INTRAMUSCULAR; INTRAVENOUS at 15:29

## 2023-03-06 RX ADMIN — SODIUM CHLORIDE, SODIUM GLUCONATE, SODIUM ACETATE, POTASSIUM CHLORIDE, MAGNESIUM CHLORIDE, SODIUM PHOSPHATE, DIBASIC, AND POTASSIUM PHOSPHATE 1000 ML: .53; .5; .37; .037; .03; .012; .00082 INJECTION, SOLUTION INTRAVENOUS at 14:26

## 2023-03-06 RX ADMIN — LORAZEPAM 2 MG: 2 INJECTION INTRAMUSCULAR; INTRAVENOUS at 13:52

## 2023-03-06 RX ADMIN — ACETAMINOPHEN 650 MG: 650 SUPPOSITORY RECTAL at 14:50

## 2023-03-06 RX ADMIN — CEFTRIAXONE 1000 MG: 1 INJECTION, SOLUTION INTRAVENOUS at 15:38

## 2023-03-06 RX ADMIN — METOPROLOL TARTRATE 25 MG: 25 TABLET, FILM COATED ORAL at 21:12

## 2023-03-06 RX ADMIN — SODIUM CHLORIDE, SODIUM GLUCONATE, SODIUM ACETATE, POTASSIUM CHLORIDE, MAGNESIUM CHLORIDE, SODIUM PHOSPHATE, DIBASIC, AND POTASSIUM PHOSPHATE 1000 ML: .53; .5; .37; .037; .03; .012; .00082 INJECTION, SOLUTION INTRAVENOUS at 15:29

## 2023-03-06 RX ADMIN — THIAMINE HYDROCHLORIDE 100 MG: 100 INJECTION, SOLUTION INTRAMUSCULAR; INTRAVENOUS at 14:41

## 2023-03-06 RX ADMIN — ADENOSINE 6 MG: 3 INJECTION, SOLUTION INTRAVENOUS at 17:38

## 2023-03-06 RX ADMIN — Medication 6 MG: at 23:42

## 2023-03-06 RX ADMIN — MAGNESIUM SULFATE HEPTAHYDRATE 2 G: 40 INJECTION, SOLUTION INTRAVENOUS at 13:56

## 2023-03-06 RX ADMIN — MONTELUKAST 10 MG: 10 TABLET, FILM COATED ORAL at 21:12

## 2023-03-06 RX ADMIN — LORAZEPAM 2 MG: 2 INJECTION INTRAMUSCULAR at 13:52

## 2023-03-06 RX ADMIN — LORAZEPAM 1 MG: 2 INJECTION INTRAMUSCULAR; INTRAVENOUS at 22:46

## 2023-03-06 RX ADMIN — ATORVASTATIN CALCIUM 20 MG: 20 TABLET, FILM COATED ORAL at 17:43

## 2023-03-06 RX ADMIN — LORAZEPAM 1 MG: 2 INJECTION INTRAMUSCULAR; INTRAVENOUS at 17:42

## 2023-03-06 NOTE — ASSESSMENT & PLAN NOTE
Patient with known CLL, follows with oncology outpatient  Leukocytosis increased above baseline, baseline appears to be 14-18  Suspect likely infection component

## 2023-03-06 NOTE — ASSESSMENT & PLAN NOTE
Patient initially found with loss of consciousness, found to be in V-fib status post shock by EMS  Repeat EKG showing A-fib with RVR  History of prolonged QT  Trend troponins, monitor on telemetry  Check 2D echo, magnesium  Cardiology consulted

## 2023-03-06 NOTE — H&P
[facility] Naresh 1950, 81 y o  male MRN: [id number]  Unit/Bed#: ED-29 Encounter: [id number]  Primary Care Provider: Felix Chatman MD   Date and time admitted to hospital: 3/6/2023  1:45 PM    * Sepsis [facility])  Assessment & Plan  82-year-old male presented with loss of consciousness, possible V-fib cardiac arrest status post shock, questionable seizure-like activity does meet sepsis criteria  Elevated leukocytosis, fevers with possible urinary source given history of straight cathing for neurogenic bladder  Continue Rocephin, status post sepsis fluids  Await blood cultures, UA and urine cultures    Ventricular fibrillation ([facility] )  Assessment & Plan  Patient initially found with loss of consciousness, found to be in V-fib status post shock by EMS  Repeat EKG showing A-fib with RVR  History of prolonged QT  Trend troponins, monitor on telemetry  Check 2D echo, magnesium  Cardiology consulted    Alcohol use  Assessment & Plan  Patient reportedly drinks at least 5 beers daily  Monitor on CIWA protocol, folate, thiamine    Seizure-like activity ([facility] )  Assessment & Plan  Patient with questionable loss of consciousness, concern for possible seizure-like activity  Previously had seizure-like symptoms back in September, suspected likely due to infection by neurology not on AED  CT head negative for acute process  Seizure cautions  Neurology consulted for further recommendation      CLL (chronic lymphocytic leukemia) ([facility] )  Assessment & Plan  Patient with known CLL, follows with oncology outpatient  Leukocytosis increased above baseline, baseline appears to be 14-18  Suspect likely infection component    Essential hypertension  Assessment & Plan  Continue home blood pressure medication amlodipine 5 mg daily and Lopressor 25 twice daily    VTE Prophylaxis: Enoxaparin (Lovenox)  / sequential compression device   Code Status: FC  POLST: There is no POLST form on file for this patient (pre-hospital)  Discussion with family: patient at bedside    Anticipated Length of Stay:  Patient will be admitted on an Inpatient basis with an anticipated length of stay of 2 midnights  Justification for Hospital Stay: IV abx, pending cardiac evaluation, neuro eval    Total Time for Visit, including Counseling / Coordination of Care: 60 minutes  Greater than 50% of this total time spent on direct patient counseling and coordination of care  Chief Complaint:   Loss of Consciousness    History of Present Illness:    Christina Mauricio is a 81 y o  male who presents with loss of consciousness  Per wife at bedside, reportedly they were watching movies earlier today and afternoon, he turned to her told her that she was not feeling well and immediately lost consciousness  EMS arrived found patient to be unresponsive, monitor showed V-fib and patient was shocked x1 and underwent CPR  Upon arrival to the ED noted to be breathing spontaneously, alert awake but lethargic  Patient had similar episodes back last year with concern of possible seizure activity  EKG shows A-fib with RVR  Patient denies any chest pain at this time, fevers, chills, nausea or vomiting  Review of Systems:    Review of Systems   Cardiovascular: Positive for chest pain  Neurological: Positive for weakness  All other systems reviewed and are negative        Past Medical and Surgical History:     Past Medical History:   Diagnosis Date   • Anxiety    • BPH (benign prostatic hypertrophy)    • Cancer ([facility] )     CLL   • [facility]ocytic [facility] )     2009   • Colon polyp    • Concussion     Resolved: 08/22/16   • Depression    • Diverticulitis 1/13/2020 2014 CT done 11/19 12/19 CT done    • E coli bacteremia 6/27/2021    2/2 blood cultures with [facility]  Source appears to be the urine    • Epididymitis 5/19/2021 5/2021   • Gastrointestinal hemorrhage     Last assessed: 08/27/13   • GERD (gastroesophageal reflux disease)    • Hearing loss of aging    • Hiatal hernia    • History of transfusion    • Hyperlipidemia    • Hypertension    • Iron deficiency anemia    • Microscopic hematuria     Last assessed: 06/28/13   • OA (osteoarthritis)     right hip   • Pneumothorax    • Primary osteoarthritis of right hip 9/29/2017    He is status post right hip arthroplasty   • Prostatitis    • Pulmonary hypertension (HCC)    • Seasonal allergies    • Urinary tract infection associated with catheterization of urinary tract ([facility] ) 2/5/2021    Pseudomonal UTI asymptomatic  Per Urology do not treat at this time  • Urinary tract infection associated with catheterization of urinary tract ([facility] ) 2/5/2021    Pseudomonal UTI asymptomatic  Per Urology do not treat at this time  He did have urosepsis from E coli 7/21   • UTI (urinary tract infection)     in past   • Wears glasses        Past Surgical History:   Procedure Laterality Date   • BLADDER SURGERY     • COLONOSCOPY     • CYSTOSCOPY  10/09/2014    Diagnostic   • CYSTOSCOPY  06/29/2020   • FL CYSTOGRAM  08/15/2022   • FRACTURE SURGERY      left lower arm   • FRACTURE SURGERY      left femur   • HERNIA REPAIR     • JOINT REPLACEMENT Right     hip   • OTHER SURGICAL HISTORY  03/2011    Spinal anesthesia epidural   • [place] ARTHRP ACETBLR/PROX FEM PROSTC AGRFT/ALGRFT Right 09/29/2017    Procedure: ARTHROPLASTY HIP TOTAL ANTERIOR;  Surgeon: Tangela Phillips MD;  Location: [facility];  Service: Orthopedics   • TONSILLECTOMY AND ADENOIDECTOMY     • TRANSURETHRAL RESECTION OF PROSTATE      x 2   • WRIST SURGERY         Meds/Allergies:    Prior to Admission medications    Medication Sig Start Date End Date Taking?  Authorizing Provider   Acetaminophen 500 MG Take 1,000 mg by mouth prn 8/4/22   Historical Provider, MD   amLODIPine (NORVASC) 5 mg tablet Take 1 tablet (5 mg total) by mouth every evening 6/22/22   Jose Carlos Petersen MD   ascorbic acid (VITAMIN C) 250 MG CHEW Chew 250 mg 2 (two) times a day    Historical Provider, MD   aspirin 81 MG tablet Take 1 tablet by mouth daily    Historical Provider, MD   atorvastatin (LIPITOR) 20 mg tablet TAKE ONE TABLET BY MOUTH ONCE EVERY DAY 9/4/22   Xuan Cheung MD   Blood Pressure Monitoring (Omron 5 Series BP Monitor) PEE  10/8/21   Historical Provider, MD   cetirizine (ZyrTEC) 10 mg tablet Take 10 mg by mouth as needed      Historical Provider, MD   LORazepam (Ativan) 0 5 mg tablet Take 1 tablet (0 5 mg total) by mouth every 8 (eight) hours as needed for anxiety 8/12/22   Xuan Cheung MD   metoprolol tartrate (LOPRESSOR) 25 mg tablet Take 1 tablet (25 mg total) by mouth every 12 (twelve) hours 3/1/23   Xuan Cheung MD   montelukast (SINGULAIR) 10 mg tablet TAKE ONE TABLET BY MOUTH DAILY 7/13/22   Xuan Cheung MD   Omega-3 Fatty Acids (FISH OIL) 1,000 mg Take by mouth daily    Historical Provider, MD   omeprazole (PriLOSEC) 40 MG capsule Take one capsule by mouth once every day  3/1/23   Xuan Cheung MD   sertraline (ZOLOFT) 50 mg tablet TAKE ONE TABLET BY MOUTH EVERY DAY 3/31/22   Xuan Cheung MD   vitamin B-12 (CYANOCOBALAMIN) 500 MCG TABS Take 1 tablet (500 mcg total) by mouth daily 4/12/21 1/17/23  Karie Echeverria MD     I have reviewed home medications with patient personally  Allergies:    Allergies   Allergen Reactions   • Codeine Other (See Comments)     agitated   • Sulfamethoxazole-Trimethoprim GI Intolerance and Abdominal Pain     upset stomach       Social History:     Marital Status: /Civil Union   Occupation: retired  Patient Pre-hospital Living Situation: home  Patient Pre-hospital Level of Mobility: ambulatory  Patient Pre-hospital Diet Restrictions: none  Substance Use History:   Social History     Substance and Sexual Activity   Alcohol Use Yes   • Alcohol/week: 10 0 standard drinks   • Types: 10 Cans of beer per week     Social History     Tobacco Use   Smoking Status Former   • Packs/day: 1 00   • Years: 15 00   • Pack years: 15 00   • Types: Cigarettes   • Start date: 67   • Quit date: 1980   • Years since quittin 5   Smokeless Tobacco Never     Social History     Substance and Sexual Activity   Drug Use Yes   • Types: Marijuana    Comment: "medical marijuana"       Family History:    Family History   Problem Relation Age of Onset   • No Known Problems Mother    • Heart attack Father    • Diabetes Brother    • Prostate cancer Brother        Physical Exam:     Vitals:   Blood Pressure: 139/90 (23 1509)  Pulse: (!) 130 (23 1509)  Temperature: 97 9 °F (36 6 °C) (23 1509)  Temp Source: Probe (23 1509)  Respirations: 20 (23 1509)  Weight - Scale: 95 7 kg (210 lb 15 7 oz) (23 1345)  SpO2: 97 % (23 1509)    Physical Exam  Vitals and nursing note reviewed  Constitutional:       General: He is not in acute distress  Appearance: He is not ill-appearing  Comments: lethargic   HENT:      Head: Normocephalic and atraumatic  Eyes:      General: No scleral icterus  Conjunctiva/sclera: Conjunctivae normal    Cardiovascular:      Rate and Rhythm: Normal rate  Rhythm irregular  Pulmonary:      Effort: Pulmonary effort is normal       Breath sounds: No wheezing or rhonchi  Abdominal:      General: Bowel sounds are normal  There is no distension  Tenderness: There is no abdominal tenderness  Genitourinary:     Comments: wolfe  Musculoskeletal:         General: No swelling  Right lower leg: No edema  Left lower leg: No edema  Skin:     General: Skin is warm and dry  Neurological:      General: No focal deficit present  Mental Status: Mental status is at baseline  Additional Data:     Lab Results: I have personally reviewed pertinent reports        Results from last 7 days   Lab Units 23  1357   WBC Thousand/uL 49 68*   HEMOGLOBIN g/dL 13 2   HEMATOCRIT % 38 6   PLATELETS Thousands/uL 228   BANDS PCT % 8   LYMPHO PCT % 68*   MONO PCT % 0*   EOS PCT % 0     Results from last 7 days   Lab Units 03/06/23  1357   SODIUM mmol/L 133*   POTASSIUM mmol/L 4 1   CHLORIDE mmol/L 98   CO2 mmol/L 20*   BUN mg/dL 10   CREATININE mg/dL 0 96   ANION GAP mmol/L 15*   CALCIUM mg/dL 9 4   ALBUMIN g/dL 4 9   TOTAL BILIRUBIN mg/dL 1 01*   ALK PHOS U/L 88   ALT U/L 39   AST U/L 49*   GLUCOSE RANDOM mg/dL 234*     Results from last 7 days   Lab Units 03/06/23  1357   INR  1 25*             Results from last 7 days   Lab Units 03/06/23  1541 03/06/23  1357   LACTIC ACID mmol/L 3 5* 5 4*   PROCALCITONIN ng/ml  --  0 05       Imaging: I have personally reviewed pertinent reports  CT head without contrast   Final Result by Alla Kaur MD (03/06 1514)      No acute intracranial abnormality  Nonemergent findings above  Workstation performed: [id number]         XR chest 1 view portable   Final Result by Alla Kaur MD (03/06 1514)   No acute cardiopulmonary findings  Workstation performed: [id number]             EKG, Pathology, and Other Studies Reviewed on Admission:   · EKG: Afib with RVR    Allscripts / Epic Records Reviewed: Yes     ** Please Note: This note has been constructed using a voice recognition system   **

## 2023-03-06 NOTE — ED NOTES
Patient transported to CT scan with RNs on cardiac monitor at this time        Nikki Singh RN  03/06/23 8012

## 2023-03-06 NOTE — CONSULTS
Cardiology Consult  03/06/23    Referring Physian: MD MILLI Hu    Chief Complain/Reason for Referal:     IMPRESSION/RECOMMENDATIONS/DISCUSSION:     1  Possible ventricular fibrillation status post shock by EMS and apparently immediate return of spontaneous circulation  2  Mental status change/confusion  3  Narrow complex tachycardia  4  Paroxysmal atrial fibrillation  5  Hypertension      · Rhythm strip showing ventricular fibrillation is not available from EMS  Initial ECG upon arrival showed probable atrial fibrillation with RVR with subsequent ECG revealing a regular narrow complex tachycardia suggesting SVT  I have reviewed these ECGs with Dr Kt Mina, and we have decided to give adenosine to hopefully break his narrow complex tachycardia or at least look at his underlying atrial rhythm  · Echocardiogram  · Trend balance of troponins  Initial level 15, then 74 at 2 hours  We will determine need for ischemic evaluation based on echocardiogram, trend of troponins, further telemetry findings, and symptoms  Continue telemetry monitoring  Magnesium and potassium levels unremarkable  We will reevaluate QTc interval once sinus rhythm restored  · Avoid QT prolonging agents  · Low-grade fever and elevated white blood cell count from baseline may suggest sepsis    Primary team currently working up        ======================================================    HPI:  I am seeing this patient in cardiology consultation for:      Nina English is a 81 y o  male with hypertension who has to be on minoxidil in the past  Junaid Jesu also has a history of prolonged QT interval, and has been seeing Dr Cecilia Ham in the past  [facility] has also been following Dr Rich Stewart a pulmonary medicine for pulmonary hypertension and sleep apnea      He was hospitalized with severe COVID-19 pneumonia 01/2021 at which time our group was consulted due to bradycardia and short runs of nonsustained ventricular tachycardia   He was maintained on metoprolol and QT prolonging agents were avoided       On this background, he was sitting in bed earlier today at around 1:45 PM his wife saw him unresponsive with his head and eyes rolled back  She called 911  He regained consciousness for a few minutes and then  became unresponsive again  Apparently EMS witnessed ventricular fibrillation and gave him 1 shock and started CPR  He was not intubated and presented to the ER lethargi and confused  Cardiology was consulted for his audible ventricular fibrillation  His wife states he has been feeling well and has had no complaints of exertional chest pain, shortness of breath, dizziness, palpitations, lower extremity edema  The patient himself does not know where he has does not know the month, but cannot tell me the year and the name of his wife  He himself cannot tell me if he has any additional symptoms  Chest discomfort at this time which seems to be reproducible with palpation, possibly due to CPR  CT head was unremarkable  Chest x-ray showed no acute disease  Initial high-sensitivity troponin was normal at 15 with a 2-hour level of 74          Past Medical History:   Diagnosis Date   • Anxiety    • BPH (benign prostatic hypertrophy)    • Cancer (HCC)     CLL   • CLL (chronic lymphocytic leukemia) ([facility] )     2009   • Colon polyp    • Concussion     Resolved: 08/22/16   • Depression    • Diverticulitis 1/13/2020 2014 CT done 11/19 12/19 CT done    • E coli bacteremia 6/27/2021 2/2 blood cultures with Madan Vega appears to be the urine    • Epididymitis 5/19/2021 5/2021   • Gastrointestinal hemorrhage     Last assessed: 08/27/13   • GERD (gastroesophageal reflux disease)    • Hearing loss of aging    • Hiatal hernia    • History of transfusion    • Hyperlipidemia    • Hypertension    • Iron deficiency anemia    • Microscopic hematuria     Last assessed: 06/28/13   • OA (osteoarthritis)     right hip   • Pneumothorax    • Primary osteoarthritis of right hip 9/29/2017    He is status post right hip arthroplasty   • Prostatitis    • Pulmonary hypertension (HCC)    • Seasonal allergies    • Urinary tract infection associated with catheterization of urinary tract ([facility] [address] ) 2/5/2021    Pseudomonal UTI asymptomatic  Per Urology do not treat at this time  • Urinary tract infection associated with catheterization of urinary tract ([facility] ) 2/5/2021    Pseudomonal UTI asymptomatic  Per Urology do not treat at this time  He did have urosepsis from E coli 7/21   • UTI (urinary tract infection)     in past   • Wears glasses          Scheduled Meds:  Current Facility-Administered Medications   Medication Dose Route Frequency Provider Last Rate   • acetaminophen  650 mg Oral Q6H PRN Tiffany Foster MD     • adenosine  6 mg Intravenous Once Carolin Rubio DO     • [START ON 3/7/2023] aspirin  81 mg Oral Daily Tiffany Foster MD     • atorvastatin  20 mg Oral Daily With Cristin Lam MD     • [START ON 3/7/2023] cefTRIAXone  1,000 mg Intravenous Q24H Tiffany Foster MD     • [START ON 3/7/2023] enoxaparin  40 mg Subcutaneous Daily Tiffany Foster MD     • [START ON 0/3/2425] folic acid  1 mg Oral Daily Tiffany Foster MD     • LORazepam  1 mg Intravenous Q4H PRN Tiffany Foster MD     • metoprolol tartrate  25 mg Oral Q12H [address] Laurent Hillman MD     • montelukast  10 mg Oral HS Tiffany Foster MD     • [START ON 3/7/2023] multivitamin-minerals  1 tablet Oral Daily Tiffany Foster MD     • [START ON 3/7/2023] pantoprazole  40 mg Oral Early Morning Tiffany Foster MD     • [START ON 3/7/2023] thiamine  100 mg Oral Daily Tiffany Foster MD       Continuous Infusions:   PRN Meds: •  acetaminophen  •  LORazepam  Allergies   Allergen Reactions   • Codeine Other (See Comments)     agitated   • Sulfamethoxazole-Trimethoprim GI Intolerance and Abdominal Pain     upset stomach     I reviewed the Home Medication list in the chart       Family History   Problem Relation Age of Onset   • No Known Problems Mother    • Heart attack Father    • Diabetes Brother    • Prostate cancer Brother        Social History     Socioeconomic History   • Marital status: /Civil Union     Spouse name: Not on file   • Number of children: Not on file   • Years of education: Not on file   • Highest education level: Not on file   Occupational History   • Not on file   Tobacco Use   • Smoking status: Former     Packs/day: 1 00     Years: 15 00     Pack years: 15 00     Types: Cigarettes     Start date: 67     Quit date: 1980     Years since quittin 5   • Smokeless tobacco: Never   Vaping Use   • Vaping Use: Never used   Substance and Sexual Activity   • Alcohol use: Yes     Alcohol/week: 10 0 standard drinks     Types: 10 Cans of beer per week   • Drug use: Yes     Types: Marijuana     Comment: "medical marijuana"   • Sexual activity: Not Currently   Other Topics Concern   • Not on file   Social History Narrative   • Not on file     Social Determinants of Health     Financial Resource Strain: Low Risk    • Difficulty of Paying Living Expenses: Not hard at all   Food Insecurity: Not on file   Transportation Needs: No Transportation Needs   • Lack of Transportation (Medical): No   • Lack of Transportation (Non-Medical):  No   Physical Activity: Not on file   Stress: Not on file   Social Connections: Not on file   Intimate Partner Violence: Not on file   Housing Stability: Not on file       Review of Systems - as per HPI, all others reviewed and negative  Vitals:    23 1724   BP: 130/76   Pulse: (!) 125   Resp: 20   Temp:    SpO2: 96%     I/O        0701  03/05 0700 03/05 0701  03/06 0700 03/06 0701  03/07 0700    I V  (mL/kg)    (20 9)    IV Piggyback   50    Total Intake(mL/kg)    (21 4)    Urine (mL/kg/hr)   250    Total Output   250    Net   +1800               Weight (last 2 days)     Date/Time Weight    23 1345 95 7 (210 98)          GEN: NAD, Alert, but disoriented to month  HEENT: Mucus membranes moist, pink conjunctivae  EYES: Pupils equal, sclera anicteric  NECK: No JVD/HJR, no carotid bruit  CARDIOVASCULAR: RR, tachycardic, No murmur, rub, gallops S1,S2, no parasternal heave/thrill  LUNGS: Clear To auscultation bilaterally  ABDOMEN: Soft, nondistended, no hepatic systolic pulsation  EXTREMITIES/VASCULAR: No edema  PSYCH: Normal Affect by limited examination  NEURO: Grossly intact by limited examination    HEME: No significant bleeding, bruising, petechia by limited examination  SKIN: No significant rashes by limited examination  MSK:  Normal upper extremity and trunk strengths by limited examination      EKG: Atrial fibrillation, SVT thereafter  TELE: Narrow complex regular tachycardia      Results from last 7 days   Lab Units 03/06/23  1357   WBC Thousand/uL 49 68*   HEMOGLOBIN g/dL 13 2   HEMATOCRIT % 38 6   PLATELETS Thousands/uL 228   MONO PCT % 0*     Results from last 7 days   Lab Units 03/06/23  1357   POTASSIUM mmol/L 4 1   CHLORIDE mmol/L 98   CO2 mmol/L 20*   BUN mg/dL 10   CREATININE mg/dL 0 96   CALCIUM mg/dL 9 4     Results from last 7 days   Lab Units 03/06/23  1357   POTASSIUM mmol/L 4 1   CHLORIDE mmol/L 98   CO2 mmol/L 20*   BUN mg/dL 10   CREATININE mg/dL 0 96   CALCIUM mg/dL 9 4   ALK PHOS U/L 88   ALT U/L 39   AST U/L 49*     No results found for: TROPONINT      Results from last 7 days   Lab Units 03/06/23  1357   CK TOTAL U/L 38*         Results from last 7 days   Lab Units 03/06/23  1357   INR  1 25*               I have personally reviewed the EKG, CXR and Telemetry images directly        Patient Active Problem List    Diagnosis Date Noted   • Sepsis ([facility] ) 03/06/2023   • Ventricular fibrillation ([facility] ) 03/06/2023   • Hyponatremia 03/06/2023   • Hyperglycemia 10/17/2022   • Seizure-like activity ([facility] ) 09/27/2022   • Alcohol use 09/27/2022   • Diverticulum of bladder 05/02/2022   • Obesity (BMI 30 0-34 9) 04/07/2022   • Situational anxiety 10/18/2021   • H/O vitamin D deficiency 07/07/2021   • Encysted hydrocele 05/19/2021   • Autoimmune hemolytic anemia ([facility] [address] ) 04/23/2021   • History of right hip replacement 04/19/2021   • GAVE (gastric antral vascular ectasia) 04/12/2021   • BPH with obstruction/lower urinary tract symptoms 01/28/2020   • Hypercholesterolemia 08/30/2019   • DIXIE not currently treated 10/17/2018   • DDD (degenerative disc disease), cervical 08/15/2018   • Essential hypertension 09/29/2017   • CLL (chronic lymphocytic leukemia) ([facility] [address] ) 09/29/2017   • Iron deficiency anemia 08/24/2017   • Pulmonary hypertension ([facility] ) 05/15/2017   • Spinal stenosis of lumbar region 05/15/2017   • Erectile dysfunction of non-organic origin 02/01/2013   • Esophageal reflux 08/16/2012   • Allergic rhinitis due to pollen 05/17/2012       Portions of the record may have been created with voice recognition software  Occasional wrong word or "sound a like" substitutions may have occurred due to the inherent limitations of voice recognition software  Read the chart carefully and recognize, using context, where substitutions have occurred

## 2023-03-06 NOTE — ASSESSMENT & PLAN NOTE
Patient with questionable loss of consciousness, concern for possible seizure-like activity  Previously had seizure-like symptoms back in September, suspected likely due to infection by neurology not on AED  CT head negative for acute process  Seizure cautions  Neurology consulted for further recommendation

## 2023-03-06 NOTE — ASSESSMENT & PLAN NOTE
72-year-old male presented with loss of consciousness, possible V-fib cardiac arrest status post shock, questionable seizure-like activity does meet sepsis criteria  Elevated leukocytosis, fevers with possible urinary source given history of straight cathing for neurogenic bladder  Continue Rocephin, status post sepsis fluids  Await blood cultures, UA and urine cultures

## 2023-03-06 NOTE — ED PROVIDER NOTES
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, recurrent UTIs, pulmonary hypertension, GAVE, GERD, hyperlipidemia, hypertension, CLL, who presents to the emergency department with ROSC after cardiac arrest   Per EMS, they were called to patient's home secondary to being unresponsive  On arrival they found patient with agonal respirations with a respiratory rate of around 4  While transporting monitor showed V-fib  Patient was subsequently shocked and underwent 1 round of CPR  No epinephrine was given  ROSC was subsequently achieved and patient was given a lidocaine bolus  He was brought to the emergency department for further management  On arrival patient breathing spontaneously eyes open and intermittently speaking  Head noting to be moving back and forth to the right and left  Localizes pain  No obvious seizure-like activity  Eventually patient stopped moving his head and was able to speak  Knows his name as well as his wife's name  Does admit to drinking alcohol  Unclear when his last drink was  Further history limited secondary to mental status  Chart reviewed  Patient with recent admission on 9/27/2022 due to seizure-like activity  Was found to have UTI on admission  He was treated with Rocephin and IV fluid  During his admission he was treated with CIWA protocol secondary to alcohol use  He had Ativan only 1 time during his hospitalization  He also underwent an EEG which was normal   Suspicion was for provoked seizure secondary to infection as well as alcohol cessation            Prior to Admission Medications   Prescriptions Last Dose Informant Patient Reported? Taking? Acetaminophen 500 MG   Yes No   Sig: Take 1,000 mg by mouth prn   Blood Pressure Monitoring (Omron 5 Series BP Monitor) PEE   Yes No   LORazepam (Ativan) 0 5 mg tablet   No No   Sig: Take 1 tablet (0 5 mg total) by mouth every 8 (eight) hours as needed for anxiety   Omega-3 Fatty Acids (FISH OIL) 1,000 mg   Yes No   Sig: Take by mouth daily   amLODIPine (NORVASC) 5 mg tablet   No No   Sig: Take 1 tablet (5 mg total) by mouth every evening   ascorbic acid (VITAMIN C) 250 MG CHEW   Yes No   Sig: Chew 250 mg 2 (two) times a day   aspirin 81 MG tablet   Yes No   Sig: Take 1 tablet by mouth daily   atorvastatin (LIPITOR) 20 mg tablet   No No   Sig: TAKE ONE TABLET BY MOUTH ONCE EVERY DAY   cetirizine (ZyrTEC) 10 mg tablet   Yes No   Sig: Take 10 mg by mouth as needed     metoprolol tartrate (LOPRESSOR) 25 mg tablet   No No   Sig: Take 1 tablet (25 mg total) by mouth every 12 (twelve) hours   montelukast (SINGULAIR) 10 mg tablet   No No   Sig: TAKE ONE TABLET BY MOUTH DAILY   omeprazole (PriLOSEC) 40 MG capsule   No No   Sig: Take one capsule by mouth once every day     sertraline (ZOLOFT) 50 mg tablet   No No   Sig: TAKE ONE TABLET BY MOUTH EVERY DAY   vitamin B-12 (CYANOCOBALAMIN) 500 MCG TABS   No No   Sig: Take 1 tablet (500 mcg total) by mouth daily      Facility-Administered Medications: None       Past Medical History:   Diagnosis Date   • Anxiety    • BPH (benign prostatic hypertrophy)    • Cancer (HCC)     CLL   • C[facility]ch[facility] )     2009   • Colon polyp    • Concussion     Resolved: 08/22/16   • Depression    • Diverticulitis 1/13/2020 2014 CT done 11/19 12/19 CT done    • E coli bacteremia 6/27/2021    2/2 blood cultures with Maribeth Ra appears to be the urine    • Epididymitis 5/19/2021 5/2021   • Gastrointestinal hemorrhage     Last assessed: 08/27/13   • GERD (gastroesophageal reflux disease)    • H/O vitamin D deficiency 7/7/2021   • Hearing loss of aging    • Hiatal hernia    • History of right hip replacement 4/19/2021   • History of transfusion    • Hyperlipidemia    • Hypertension    • Hyponatremia 3/6/2023   • Iron deficiency anemia    • Microscopic hematuria     Last assessed: 06/28/13   • OA (osteoarthritis)     right hip   • Pneumothorax    • Primary osteoarthritis of right hip 9/29/2017    He is status post right hip arthroplasty   • Prostatitis    • Pulmonary hypertension (HCC)    • Seasonal allergies    • Urinary tract infection associated with catheterization of urinary tract ([facility] ) 2/5/2021    Pseudomonal UTI asymptomatic  Per Urology do not treat at this time  • Urinary tract infection associated with catheterization of urinary tract ([facility] ) 2/5/2021    Pseudomonal UTI asymptomatic  Per Urology do not treat at this time  He did have urosepsis from E coli 7/21   • UTI (urinary tract infection)     in past   • Wears glasses        Past Surgical History:   Procedure Laterality Date   • BLADDER SURGERY     • COLONOSCOPY     • CYSTOSCOPY  10/09/2014    Diagnostic   • CYSTOSCOPY  06/29/2020   • FL CYSTOGRAM  08/15/2022   • FRACTURE SURGERY      left lower arm   • FRACTURE SURGERY      left femur   • HERNIA REPAIR     • JOINT REPLACEMENT Right     hip   • OTHER SURGICAL HISTORY  03/2011    Spinal anesthesia epidural   • AK ARTHRP ACETBLR/PROX FEM PROSTC AGRFT/ALGRFT Right 09/29/2017    Procedure: ARTHROPLASTY HIP TOTAL ANTERIOR;  Surgeon: Mirela Ayala MD;  Location: [facility];  Service: Orthopedics   • TONSILLECTOMY AND ADENOIDECTOMY     • TRANSURETHRAL RESECTION OF PROSTATE      x 2   • WRIST SURGERY         Family History   Problem Relation Age of Onset   • No Known Problems Mother    • Heart attack Father    • Diabetes Brother    • Prostate cancer Brother      I have reviewed and agree with the history as documented      E-Cigarette/Vaping   • E-Cigarette Use Never User      E-Cigarette/Vaping Substances   • Nicotine No    • THC No    • CBD No    • Flavoring No    • Other No    • Unknown No      Social History     Tobacco Use   • Smoking status: Former     Packs/day: 1 00     Years: 15 00     Pack years: 15 00     Types: Cigarettes     Start date: 67     Quit date: 1980     Years since quittin 5   • Smokeless tobacco: Never   Vaping Use   • Vaping Use: Never used   Substance Use Topics   • Alcohol use: Yes     Alcohol/week: 10 0 standard drinks     Types: 10 Cans of beer per week   • Drug use: Yes     Types: Marijuana     Comment: "medical marijuana"        Review of Systems   Unable to perform ROS: Mental status change       Physical Exam  ED Triage Vitals   Temperature Pulse Respirations Blood Pressure SpO2   23 1404 23 1345 23 1345 23 1345 23 1443   (!) 100 8 °F (38 2 °C) (!) 126 22 150/94 95 %      Temp Source Heart Rate Source Patient Position - Orthostatic VS BP Location FiO2 (%)   23 1404 23 1345 23 1443 23 1443 --   Rectal Monitor Lying Right arm       Pain Score       23 1443       No Pain             Orthostatic Vital Signs  Vitals:    23 0816 23 0915 23 1300 23 1700   BP: 118/66 118/66 108/67 134/83   Pulse: 62 57 (!) 54 88   Patient Position - Orthostatic VS:           Physical Exam  Vitals and nursing note reviewed  Constitutional:       General: He is not in acute distress  Appearance: He is well-developed  He is ill-appearing  He is not diaphoretic  HENT:      Head: Normocephalic and atraumatic  Right Ear: External ear normal       Left Ear: External ear normal       Nose: Nose normal    Eyes:      General: Lids are normal  No scleral icterus  Cardiovascular:      Rate and Rhythm: Regular rhythm  Tachycardia present  Heart sounds: Normal heart sounds  No murmur heard  No friction rub  No gallop  Pulmonary:      Effort: Pulmonary effort is normal  No respiratory distress  Breath sounds: Normal breath sounds  No wheezing or rales  Abdominal:      Palpations: Abdomen is soft  Tenderness: There is no abdominal tenderness  There is no guarding or rebound  Musculoskeletal:         General: No deformity  Normal range of motion  Cervical back: Normal range of motion and neck supple  Right lower leg: No edema  Left lower leg: No edema  Skin:     General: Skin is warm and dry  Neurological:      Mental Status: He is alert  GCS: GCS eye subscore is 4  GCS verbal subscore is 4  GCS motor subscore is 6  Cranial Nerves: No facial asymmetry  Motor: No tremor or seizure activity           ED Medications  Medications   LORazepam (ATIVAN) injection 2 mg (2 mg Intravenous Given 3/6/23 1352)   magnesium sulfate 2 g/50 mL IVPB (premix) 2 g (0 g Intravenous Stopped 3/6/23 1428)   thiamine (VITAMIN B1) 100 mg in sodium chloride 0 9 % 50 mL IVPB (0 mg Intravenous Stopped 3/6/23 1511)   multi-electrolyte (ISOLYTE-S PH 7 4) bolus 1,000 mL (0 mL Intravenous Stopped 3/6/23 1529)   acetaminophen (TYLENOL) rectal suppository 650 mg (650 mg Rectal Given 3/6/23 1450)   EPINEPHrine (FOR EMS ONLY) (ADRENALIN) injection 1 mg (0 mg Does not apply Given to EMS 3/6/23 1408)   lidocaine (cardiac) (FOR EMS ONLY) 20 mg/mL injection 100 mg (0 mg Does not apply Given to EMS 3/6/23 1408)   ketorolac (TORADOL) injection 15 mg (15 mg Intravenous Given 3/6/23 1529)   multi-electrolyte (ISOLYTE-S PH 7 4) bolus 1,000 mL (0 mL Intravenous Stopped 3/6/23 1646)   cefTRIAXone (ROCEPHIN) IVPB (premix in dextrose) 1,000 mg 50 mL (0 mg Intravenous Stopped 3/6/23 1646)   adenosine (ADENOCARD) injection 6 mg (6 mg Intravenous Given 3/6/23 1738)   magnesium sulfate 1-5 GM/100ML-% IVPB (premix) SOLN **ADS Override Pull** (0 g  Stopped 3/7/23 1045)   iohexol (OMNIPAQUE) 350 MG/ML injection (SINGLE-DOSE) 85 mL (85 mL Intravenous Given 3/7/23 1532)   LORazepam (ATIVAN) injection 2 mg (2 mg Intravenous Given 3/7/23 1713)       Diagnostic Studies  Results Reviewed     Procedure Component Value Units Date/Time    Urine culture [[id number]]  (Abnormal) Collected: 03/06/23 1424    Lab Status: Preliminary result Specimen: Urine, Indwelling Hillman Catheter Updated: 03/07/23 1917     Urine Culture >100,000 cfu/ml Gram Negative Simba Enteric Like    Hemoglobin A1C [[id number]] Collected: 03/07/23 0605    Lab Status: Final result Specimen: Blood from Arm, Left Updated: 03/07/23 1156     Hemoglobin A1C 5 4 %       mg/dl     Magnesium [[id number]]  (Normal) Collected: 03/07/23 0605    Lab Status: Final result Specimen: Blood from Arm, Left Updated: 03/07/23 0626     Magnesium 2 2 mg/dL     Lipid panel [[id number]] Collected: 03/07/23 0605    Lab Status: Final result Specimen: Blood from Arm, Left Updated: 03/07/23 0626     Cholesterol 107 mg/dL      Triglycerides 87 mg/dL      HDL, Direct 40 mg/dL      LDL Calculated 50 mg/dL      Non-HDL-Chol (CHOL-HDL) 67 mg/dl     Comprehensive metabolic panel [[id number]]  (Abnormal) Collected: 03/07/23 0605    Lab Status: Final result Specimen: Blood from Arm, Left Updated: 03/07/23 0626     Sodium 130 mmol/L      Potassium 4 0 mmol/L      Chloride 95 mmol/L      CO2 24 mmol/L      ANION GAP 11 mmol/L      BUN 14 mg/dL      Creatinine 0 91 mg/dL      Glucose 135 mg/dL      Calcium 8 4 mg/dL      AST 31 U/L      ALT 30 U/L      Alkaline Phosphatase 66 U/L      Total Protein 7 6 g/dL      Albumin 4 4 g/dL      Total Bilirubin 0 92 mg/dL      eGFR 85 ml/min/1 73sq m     Narrative:      [place] guidelines for Chronic Kidney Disease (CKD):   •  Stage 1 with normal or high GFR (GFR > 90 mL/min/1 73 square meters)  •  Stage 2 Mild CKD (GFR = 60-89 mL/min/1 73 square meters)  •  Stage 3A Moderate CKD (GFR = 45-59 mL/min/1 73 square meters)  •  Stage 3B Moderate CKD (GFR = 30-44 mL/min/1 73 square meters)  •  Stage 4 Severe CKD (GFR = 15-29 mL/min/1 73 square meters)  • Stage 5 End Stage CKD (GFR <15 mL/min/1 73 square meters)  Note: GFR calculation is accurate only with a steady state creatinine    CBC and differential [[id number]]  (Abnormal) Collected: 03/07/23 0605    Lab Status: Final result Specimen: Blood from Arm, Left Updated: 03/07/23 0623     WBC 26 79 Thousand/uL      RBC 2 95 Million/uL      Hemoglobin 10 8 g/dL      Hematocrit 31 2 %       fL      MCH 36 6 pg      MCHC 34 6 g/dL      RDW 13 9 %      MPV 9 8 fL      Platelets 496 Thousands/uL      nRBC 0 /100 WBCs     Narrative:      See Manual Diff Done Within 3 Days    Blood culture #1 [[id number]] Collected: 03/06/23 1400    Lab Status: Preliminary result Specimen: Blood from Hand, Left Updated: 03/06/23 2302     Blood Culture Received in Microbiology Lab  Culture in Progress  Blood culture #2 [[id number]] Collected: 03/06/23 1357    Lab Status: Preliminary result Specimen: Blood from Arm, Right Updated: 03/06/23 2302     Blood Culture Received in Microbiology Lab  Culture in Progress  HS Troponin I 4hr [[id number]]  (Abnormal) Collected: 03/06/23 1802    Lab Status: Final result Specimen: Blood from Arm, Right Updated: 03/06/23 1838     hs TnI 4hr 169 ng/L      Delta 4hr hsTnI 154 ng/L     FLU/RSV/COVID - if FLU/RSV clinically relevant [[id number]]  (Normal) Collected: 03/06/23 1622    Lab Status: Final result Specimen: Nares from Nose Updated: 03/06/23 1732     SARS-CoV-2 Negative     INFLUENZA A PCR Negative     INFLUENZA B PCR Negative     RSV PCR Negative    Narrative:      FOR PEDIATRIC PATIENTS - copy/paste COVID Guidelines URL to browser: [email or web address]    SARS-CoV-2 assay is a Nucleic Acid Amplification assay intended for the  qualitative detection of nucleic acid from SARS-CoV-2 in nasopharyngeal  swabs  Results are for the presumptive identification of SARS-CoV-2 RNA      Positive results are indicative of infection with SARS-CoV-2, the virus  causing COVID-19, but do not rule out bacterial infection or co-infection  with other viruses  Laboratories within the [place] and its  territories are required to report all positive results to the appropriate  public health authorities  Negative results do not preclude SARS-CoV-2  infection and should not be used as the sole basis for treatment or other  patient management decisions  Negative results must be combined with  clinical observations, patient history, and epidemiological information  This test has not been FDA cleared or approved  This test has been authorized by FDA under an Emergency Use Authorization  (EUA)  This test is only authorized for the duration of time the  declaration that circumstances exist justifying the authorization of the  emergency use of an in vitro diagnostic tests for detection of SARS-CoV-2  virus and/or diagnosis of COVID-19 infection under section 564(b)(1) of  the Act, 21 U  [place] C  628GMI-0(A)(7), unless the authorization is terminated  or revoked sooner  The test has been validated but independent review by FDA  and CLIA is pending  Test performed using [employer] [facility] GeneXpert: This RT-PCR assay targets N2,  a region unique to SARS-CoV-2  A conserved region in the E-gene was chosen  for pan-Sarbecovirus detection which includes SARS-CoV-2  According to CMS-2020-01-R, this platform meets the definition of high-throughput technology      Urine Microscopic [[id number]]  (Abnormal) Collected: 03/06/23 1424    Lab Status: Final result Specimen: Urine, Indwelling Hillman Catheter Updated: 03/06/23 1627     RBC, UA 0-1 /hpf      WBC, UA 10-20 /hpf      Epithelial Cells None Seen /hpf      Bacteria, UA Innumerable /hpf     HS Troponin I 2hr [[id number]]  (Abnormal) Collected: 03/06/23 1541    Lab Status: Final result Specimen: Blood from Arm, Right Updated: 03/06/23 1618     hs TnI 2hr 74 ng/L      Delta 2hr hsTnI 59 ng/L     Lactic acid 2 Hours [[id number]]  (Abnormal) Collected: 03/06/23 1541    Lab Status: Final result Specimen: Blood from Arm, Right Updated: 03/06/23 1612     LACTIC ACID 3 5 mmol/L     Narrative:      Result may be elevated if tourniquet was used during collection  Manual Differential(PHLEBS Do Not Order) [[id number]]  (Abnormal) Collected: 03/06/23 1357    Lab Status: Final result Specimen: Blood from Arm, Right Updated: 03/06/23 1529     Segmented % 21 %      Bands % 8 %      Lymphocytes % 68 %      Monocytes % 0 %      Eosinophils, % 0 %      Basophils % 0 %      Atypical Lymphocytes % 3 %      Absolute Neutrophils 14 41 Thousand/uL      Lymphocytes Absolute 33 78 Thousand/uL      Monocytes Absolute 0 00 Thousand/uL      Eosinophils Absolute 0 00 Thousand/uL      Basophils Absolute 0 00 Thousand/uL      Total Counted --     RBC Morphology Normal     Platelet Estimate Adequate    CBC and differential [[id number]]  (Abnormal) Collected: 03/06/23 1357    Lab Status: Final result Specimen: Blood from Arm, Right Updated: 03/06/23 1513     WBC 49 68 Thousand/uL      RBC 3 66 Million/uL      Hemoglobin 13 2 g/dL      Hematocrit 38 6 %       fL      MCH 36 1 pg      MCHC 34 2 g/dL      RDW 13 9 %      MPV 9 7 fL      Platelets 055 Thousands/uL     Narrative: This is an appended report  These results have been appended to a previously verified report  Procalcitonin [[id number]]  (Normal) Collected: 03/06/23 1357    Lab Status: Final result Specimen: Blood from Arm, Right Updated: 03/06/23 1512     Procalcitonin 0 05 ng/ml     Lactic acid [[id number]]  (Abnormal) Collected: 03/06/23 1357    Lab Status: Final result Specimen: Blood from Arm, Right Updated: 03/06/23 1500     LACTIC ACID 5 4 mmol/L     Narrative:      Result may be elevated if tourniquet was used during collection      Rapid drug screen, urine [[id number]]  (Abnormal) Collected: 03/06/23 1424    Lab Status: Final result Specimen: Urine, Catheter Updated: 03/06/23 1458     Amph/Meth UR Negative     Barbiturate Ur Negative     Benzodiazepine Urine Negative     Cocaine Urine Negative     Methadone Urine Negative     Opiate Urine Negative     PCP Ur Negative     THC Urine Positive     Oxycodone Urine Negative    Narrative:      Presumptive report  If requested, specimen will be sent to reference lab for confirmation  FOR MEDICAL PURPOSES ONLY  IF CONFIRMATION NEEDED PLEASE CONTACT THE LAB WITHIN 5 DAYS  Drug Screen Cutoff Levels:  AMPHETAMINE/METHAMPHETAMINES  1000 ng/mL  BARBITURATES     200 ng/mL  BENZODIAZEPINES     200 ng/mL  COCAINE      300 ng/mL  METHADONE      300 ng/mL  OPIATES      300 ng/mL  PHENCYCLIDINE     25 ng/mL  THC       50 ng/mL  OXYCODONE      100 ng/mL    TSH, 3rd generation with Free T4 reflex [[id number]]  (Normal) Collected: 03/06/23 1357    Lab Status: Final result Specimen: Blood from Arm, Right Updated: 03/06/23 1451     TSH 3RD GENERATON 1 754 uIU/mL     Narrative:      Patients undergoing fluorescein dye angiography may retain small amounts of fluorescein in the body for 48-72 hours post procedure  Samples containing fluorescein can produce falsely depressed TSH values  If the patient had this procedure,a specimen should be resubmitted post fluorescein clearance        Protime-INR [[id number]]  (Abnormal) Collected: 03/06/23 1357    Lab Status: Final result Specimen: Blood from Arm, Right Updated: 03/06/23 1447     Protime 15 6 seconds      INR 1 25    APTT [[id number]]  (Normal) Collected: 03/06/23 1357    Lab Status: Final result Specimen: Blood from Arm, Right Updated: 03/06/23 1447     PTT 26 seconds     UA w Reflex to Microscopic w Reflex to Culture [[id number]]  (Abnormal) Collected: 03/06/23 1424    Lab Status: Final result Specimen: Urine, Indwelling Hillman Catheter Updated: 03/06/23 1445     Color, UA Yellow     Clarity, UA Slightly Cloudy     Specific Gravity, UA 1 025     pH, UA 6 0     Leukocytes, UA Trace     Nitrite, UA Negative     Protein,  (2+) mg/dl      Glucose, UA Negative mg/dl      Ketones, UA Negative mg/dl      Urobilinogen, UA 0 2 E U /dl      Bilirubin, UA Negative     Occult Blood, UA Small    B-Type Natriuretic Peptide(BNP) [[id number]]  (Abnormal) Collected: 03/06/23 1357    Lab Status: Final result Specimen: Blood from Arm, Right Updated: 03/06/23 1442      pg/mL     HS Troponin 0hr (reflex protocol) [[id number]]  (Normal) Collected: 03/06/23 1357    Lab Status: Final result Specimen: Blood from Arm, Right Updated: 03/06/23 1442     hs TnI 0hr 15 ng/L     Ammonia [[id number]]  (Normal) Collected: 03/06/23 1357    Lab Status: Final result Specimen: Blood from Arm, Right Updated: 03/06/23 1441     Ammonia 57 umol/L     Ethanol [[id number]]  (Normal) Collected: 03/06/23 1350    Lab Status: Final result Specimen: Blood from Arm, Right Updated: 03/06/23 1435     Ethanol Lvl <10 mg/dL     Comprehensive metabolic panel [[id number]]  (Abnormal) Collected: 03/06/23 1357    Lab Status: Final result Specimen: Blood from Arm, Right Updated: 03/06/23 1434     Sodium 133 mmol/L      Potassium 4 1 mmol/L      Chloride 98 mmol/L      CO2 20 mmol/L      ANION GAP 15 mmol/L      BUN 10 mg/dL      Creatinine 0 96 mg/dL      Glucose 234 mg/dL      Calcium 9 4 mg/dL      AST 49 U/L      ALT 39 U/L      Alkaline Phosphatase 88 U/L      Total Protein 8 3 g/dL      Albumin 4 9 g/dL      Total Bilirubin 1 01 mg/dL      eGFR 79 ml/min/1 73sq m     Narrative:      [place] guidelines for Chronic Kidney Disease (CKD):   •  Stage 1 with normal or high GFR (GFR > 90 mL/min/1 73 square meters)  •  Stage 2 Mild CKD (GFR = 60-89 mL/min/1 73 square meters)  •  Stage 3A Moderate CKD (GFR = 45-59 mL/min/1 73 square meters)  •  Stage 3B Moderate CKD (GFR = 30-44 mL/min/1 73 square meters)  •  Stage 4 Severe CKD (GFR = 15-29 mL/min/1 73 square meters)  •  Stage 5 End Stage CKD (GFR <15 mL/min/1 73 square meters)  Note: GFR calculation is accurate only with a steady state creatinine    Magnesium [[id number]]  (Normal) Collected: 03/06/23 1357    Lab Status: Final result Specimen: Blood from Arm, Right Updated: 03/06/23 1434     Magnesium 1 9 mg/dL     Phosphorus [[id number]]  (Abnormal) Collected: 03/06/23 1357    Lab Status: Final result Specimen: Blood from Arm, Right Updated: 03/06/23 1434     Phosphorus 5 1 mg/dL     CK (with reflex to MB) [[id number]]  (Abnormal) Collected: 03/06/23 1357    Lab Status: Final result Specimen: Blood from Arm, Right Updated: 03/06/23 1434     Total CK 38 U/L     Salicylate level [[id number]]  (Normal) Collected: 03/06/23 1357    Lab Status: Final result Specimen: Blood from Arm, Right Updated: 88/76/18 1254     Salicylate Lvl <5 mg/dL     Acetaminophen level-If concentration is detectable, please discuss with medical  on call  [[id number]]  (Abnormal) Collected: 03/06/23 1357    Lab Status: Final result Specimen: Blood from Arm, Right Updated: 03/06/23 1434     Acetaminophen Level <10 ug/mL                  CTA head and neck w wo contrast   Final Result by Brando Pierre DO (03/07 1555)      CT brain: Stable mild chronic microangiopathic change with no acute intracranial abnormality identified  CT angiography: Mild atherosclerotic disease of the carotid bifurcations and intracranial vasculature without flow restrictive stenosis  No occlusion or thrombosis  Workstation performed: [id number]         CT head without contrast   Final Result by Tia Hawkins MD (03/06 1514)      No acute intracranial abnormality  Nonemergent findings above  Workstation performed: [id number]         XR chest 1 view portable   Final Result by Tia Hawkins MD (03/06 1514)   No acute cardiopulmonary findings                     Workstation performed: [id number]               Procedures  ECG 12 Lead Documentation Only    Date/Time: 3/7/2023 9:40 PM  Performed by: Bartholome Jeans, DO  Authorized by: Bridger Aviles DO     ECG reviewed by me, the ED Provider: yes    Patient location:  ED  Interpretation:     Interpretation: abnormal    Rate:     ECG rate:  127    ECG rate assessment: tachycardic    Rhythm:     Rhythm: atrial fibrillation    Ectopy:     Ectopy: none    QRS:     QRS axis:  Normal  Conduction:     Conduction: normal    ST segments:     ST segments:  Normal  T waves:     T waves: normal        Conscious Sedation Assessment    Flowsheet Row Classification Score   ASA Scale Assessment 4-Severe incapacitating disease process that is a constant threat to life filed at 03/07/2023 1403   Mallampati Classification Class II: soft palate, uvula, fauces visible - No Difficulty filed at 03/07/2023 1403          ED Course  ED Course as of 03/07/23 2153   Mon Mar 06, 2023   1402 Temperature(!): 100 8 °F (38 2 °C)  Tylenol ordered   1411 Family at bedside  Wife states that they were watching movie earlier today when patient started to say he did not feel well  He then suddenly became unresponsive  Wife then called 911  She states something similar happened several months ago but he did not experience a cardiac arrest at that time  She states the patient drinks alcohol daily  Unsure if he has been through alcohol withdrawal in the past    1435 Magnesium: 1 9   1436 Phosphorus(!): 5 1   1436 Total CK(!): 38   1436 Sodium(!): 133   1436 Potassium: 4 1   1436 MEDICAL ALCOHOL: <10  Withdrawal seizure? 1436 eGFR: 79   1444 BNP(!): 565   1444 hs TnI 0hr: 15   1444 Ammonia: 57   1445 Leukocytes, UA(!): Trace   1452 TSH 3RD GENERATON: 1 754   1511 LACTIC ACID(!!): 5 4  30cc/kg of IBW fluids ordered, abx ordered     1512 Procalcitonin: 0 05   1515 WBC(!!): 49 68   1515 CT head without contrast  No acute intracranial abnormality  Nonemergent findings above  1516 XR chest 1 view portable  No acute cardiopulmonary findings  1522 TT sent to [facility] for admission   1720 Received message from cardiology   He is questioning if most recent EKG shows SVT  Would like me to try adenosine  1745 Pt appears to have converted from SVT to sinus after administration of adenosine  Pt remains hemodynamically stable  Cardiology informed  Initial Sepsis Screening     Row Name 03/06/23 1511                Is the patient's history suggestive of a new or worsening infection? Yes (Proceed)  -CJ        Suspected source of infection suspect infection, source unknown  -CJ        Indicate SIRS criteria Hyperthemia > 38 3C (100 9F) OR Hypothermia <36C (96 8F); Tachycardia > 90 bpm  -CJ        Are two or more of the above signs & symptoms of infection both present and new to the patient? Yes (Proceed)  -CJ        Assess for evidence of organ dysfunction: Are any of the below criteria present within 6 hours of suspected infection and SIRS criteria that are NOT considered to be chronic conditions? Lactate >/equal 4 0  -CJ        Date of presentation of septic shock 03/06/23  -CJ        Time of presentation of septic shock 1511  -CJ        Fluid Resuscitation: 30 ml/kg IV fluid bolus will be given based on ideal body weight (use if BMI >30)  -CJ        Is the patient is persistently hypotensive in the hour after fluid bolus administration? If yes, patient meets criteria for vasopressor use  NO  -CJ        Sepsis Note: Click "NEXT" below (NOT "close") to generate sepsis note based on above information  YES (proceed by clicking "NEXT")  -              User Key  (r) = Recorded By, (t) = Taken By, (c) = Cosigned By    [address] Name Provider Type    Vee And Demarco Streets, DO Physician                SBIRT 20yo+    Flowsheet Row Most Recent Value   SBIRT (25 yo +)    In order to provide better care to our patients, we are screening all of our patients for alcohol and drug use  Would it be okay to ask you these screening questions?  Unable to answer at this time Filed at: 03/06/2023 2002 [facility]  Patient is a 81 y o  male who presents to the ED after an episode of unresponsiveness, questionable seizure, and after vfib arrest w/ subsequent ROSC  On arrival patient is ill appearing but non-toxic  Tachycardic and febrile but normotensive  Speaking and protecting airway  Confused  As he is protecting his airway and communicating, no indication for intubation  Given prior hx of something similar (minus the arrest) which was 2/2 a UTI, in addition to current fever, question if this episode is again 2/2 a UTI  Doubt intraabdominal or intrathoracic causes of fever given no other symptoms  Unclear cause of vfib arrest  Differential for this includes ACS, structural cardiac abnormality  Low suspicion for PE  Doubt intracranial abnormality  Plan: Labs, [facility], admission                 Portions of the record may have been created with voice recognition software  Occasional wrong word or "sound a like" substitutions may have occurred due to the inherent limitations of voice recognition software  Read the chart carefully and recognize, using context, where substitutions have occurred  Elevated brain natriuretic peptide (BNP) level: acute illness or injury  Elevated lactic acid level: acute illness or injury  Fever: acute illness or injury  Seizure [facility]): acute illness or injury  Urinary tract infection: acute illness or injury  Ventricular fibrillation [facility]): acute illness or injury  Amount and/or Complexity of Data Reviewed  External Data Reviewed: notes  Details: Details documeted in HPI  Labs: ordered  Decision-making details documented in ED Course  Radiology: ordered  Decision-making details documented in ED Course  ECG/medicine tests: ordered  Risk  OTC drugs  Prescription drug management  Decision regarding hospitalization              Disposition  Final diagnoses:   Fever   Ventricular fibrillation ([address] )   Seizure ([facility] [address] )   Urinary tract infection   Elevated lactic acid level Leukocytosis   Elevated brain natriuretic peptide (BNP) level     Time reflects when diagnosis was documented in both MDM as applicable and the Disposition within this note     Time User Action Codes Description Comment    3/6/2023  2:30 PM Yin Mates [R50 9] Fever     3/6/2023  3:32 PM Cruzito Iniguez Add [I49 01] Ventricular fibrillation ([address] )     3/6/2023  3:33 PM Cruzito Iniguez Add [R56 9] Seizure ([address] )     3/7/2023 10:42 AM Kike Stevens Modify [I49 01] Ventricular fibrillation ([address] )     3/7/2023  9:41 PM Cruzito Iniguez Add [N39 0] Urinary tract infection     3/7/2023  9:41 PM Cruzito Iniguez Add [R79 89] Elevated lactic acid level     3/7/2023  9:41 PM Cruzito Iniguez Add [X73 969] Leukocytosis     3/7/2023  9:41 PM Cruzito Iniguez Add [R79 89] Elevated brain natriuretic peptide (BNP) level       ED Disposition     ED Disposition   Admit    Condition   Stable    Date/Time   Tue Mar 7, 2023  9:41 PM    Comment   Case was discussed with Dr Weston Briscoe and the patient's admission status was agreed to be Admission Status: inpatient status to the service of Dr Weston Briscoe MD Documentation    Flowsheet Row Most Recent Value   Transported by (Company and Unit #) [facility]      RN Documentation    Flowsheet Row Most Recent Value   Transported by [employer] and Unit #) [facility]      Follow-up Information    None         Discharge Medication List as of 3/7/2023  5:46 PM      CONTINUE these medications which have NOT CHANGED    Details   Acetaminophen 500 MG Take 1,000 mg by mouth prn, Starting Thu 8/4/2022, Historical Med      amLODIPine (NORVASC) 5 mg tablet Take 1 tablet (5 mg total) by mouth every evening, Starting Wed 6/22/2022, Normal      ascorbic acid (VITAMIN C) 250 MG CHEW Chew 250 mg 2 (two) times a day, Historical Med      aspirin 81 MG tablet Take 1 tablet by mouth daily, Historical Med      atorvastatin (LIPITOR) 20 mg tablet TAKE ONE TABLET BY MOUTH ONCE EVERY DAY, Normal      Blood Pressure Monitoring (Omron 5 Series BP Monitor) PEE Starting Fri 10/8/2021, Historical Med      cetirizine (ZyrTEC) 10 mg tablet Take 10 mg by mouth as needed  , Historical Med      LORazepam (Ativan) 0 5 mg tablet Take 1 tablet (0 5 mg total) by mouth every 8 (eight) hours as needed for anxiety, Starting Fri 8/12/2022, Normal      metoprolol tartrate (LOPRESSOR) 25 mg tablet Take 1 tablet (25 mg total) by mouth every 12 (twelve) hours, Starting Wed 3/1/2023, Normal      montelukast (SINGULAIR) 10 mg tablet TAKE ONE TABLET BY MOUTH DAILY, Normal      Omega-3 Fatty Acids (FISH OIL) 1,000 mg Take by mouth daily, Historical Med      omeprazole (PriLOSEC) 40 MG capsule Take one capsule by mouth once every day , Normal      sertraline (ZOLOFT) 50 mg tablet TAKE ONE TABLET BY MOUTH EVERY DAY, Normal      vitamin B-12 (CYANOCOBALAMIN) 500 MCG TABS Take 1 tablet (500 mcg total) by mouth daily, Starting Mon 4/12/2021, Until Tue 1/17/2023, Normal           No discharge procedures on file  PDMP Review       Value Time User    PDMP Reviewed  Yes 9/30/2022  1:12 PM Brook Lopez MD           ED Provider  Attending physically available and evaluated Lesly Cartwright  MARTÍN managed the patient along with the ED Attending      Electronically Signed by         Bridger Aviles DO  03/07/23 2375

## 2023-03-06 NOTE — Clinical Note
Some bleeding rt groin, applying manual pressure to site until hemaostasis obtained  Rfv sheath sutured in place  Temp wire secured at 65cm at skin

## 2023-03-07 ENCOUNTER — APPOINTMENT (INPATIENT)
Dept: CT IMAGING | Facility: HOSPITAL | Age: 73
End: 2023-03-07

## 2023-03-07 ENCOUNTER — APPOINTMENT (INPATIENT)
Dept: NON INVASIVE DIAGNOSTICS | Facility: HOSPITAL | Age: 73
End: 2023-03-07

## 2023-03-07 ENCOUNTER — HOSPITAL ENCOUNTER (INPATIENT)
Facility: HOSPITAL | Age: 73
LOS: 6 days | Discharge: HOME | End: 2023-03-13
Attending: INTERNAL MEDICINE | Admitting: INTERNAL MEDICINE

## 2023-03-07 VITALS
BODY MASS INDEX: 29.7 KG/M2 | TEMPERATURE: 97.8 F | DIASTOLIC BLOOD PRESSURE: 83 MMHG | WEIGHT: 196 LBS | RESPIRATION RATE: 29 BRPM | HEIGHT: 68 IN | HEART RATE: 88 BPM | OXYGEN SATURATION: 95 % | SYSTOLIC BLOOD PRESSURE: 134 MMHG

## 2023-03-07 DIAGNOSIS — R94.31 PROLONGED QT INTERVAL: Primary | ICD-10-CM

## 2023-03-07 DIAGNOSIS — I47.1 NARROW COMPLEX TACHYCARDIA (HCC): ICD-10-CM

## 2023-03-07 DIAGNOSIS — I50.9 NEW ONSET OF CONGESTIVE HEART FAILURE (HCC): ICD-10-CM

## 2023-03-07 DIAGNOSIS — I49.01 VENTRICULAR FIBRILLATION (HCC): ICD-10-CM

## 2023-03-07 DIAGNOSIS — I51.81 TAKOTSUBO CARDIOMYOPATHY: ICD-10-CM

## 2023-03-07 PROBLEM — I48.0 PAROXYSMAL A-FIB (HCC): Status: ACTIVE | Noted: 2023-03-07

## 2023-03-07 PROBLEM — E87.1 HYPONATREMIA: Status: RESOLVED | Noted: 2023-03-06 | Resolved: 2023-03-07

## 2023-03-07 PROBLEM — I47.19 NARROW COMPLEX TACHYCARDIA: Status: ACTIVE | Noted: 2023-03-07

## 2023-03-07 PROBLEM — Z86.39 H/O VITAMIN D DEFICIENCY: Status: RESOLVED | Noted: 2021-07-07 | Resolved: 2023-03-07

## 2023-03-07 PROBLEM — Z96.641 HISTORY OF RIGHT HIP REPLACEMENT: Status: RESOLVED | Noted: 2021-04-19 | Resolved: 2023-03-07

## 2023-03-07 PROBLEM — F41.8 SITUATIONAL ANXIETY: Status: RESOLVED | Noted: 2021-10-18 | Resolved: 2023-03-07

## 2023-03-07 PROBLEM — R40.4 UNRESPONSIVE EPISODE: Status: ACTIVE | Noted: 2023-03-07

## 2023-03-07 PROBLEM — R41.89 UNRESPONSIVE EPISODE: Status: ACTIVE | Noted: 2023-03-07

## 2023-03-07 PROBLEM — R73.9 HYPERGLYCEMIA: Status: RESOLVED | Noted: 2022-10-17 | Resolved: 2023-03-07

## 2023-03-07 LAB
ALBUMIN SERPL BCP-MCNC: 4.4 G/DL (ref 3.5–5)
ALP SERPL-CCNC: 66 U/L (ref 34–104)
ALT SERPL W P-5'-P-CCNC: 30 U/L (ref 7–52)
ANION GAP SERPL CALCULATED.3IONS-SCNC: 11 MMOL/L (ref 4–13)
AORTIC ROOT: 3.5 CM
APICAL FOUR CHAMBER EJECTION FRACTION: 44 %
ASCENDING AORTA: 4.1 CM
AST SERPL W P-5'-P-CCNC: 31 U/L (ref 13–39)
ATRIAL RATE: 110 BPM
ATRIAL RATE: 55 BPM
ATRIAL RATE: 60 BPM
BILIRUB SERPL-MCNC: 0.92 MG/DL (ref 0.2–1)
BUN SERPL-MCNC: 14 MG/DL (ref 5–25)
CALCIUM SERPL-MCNC: 8.4 MG/DL (ref 8.4–10.2)
CHLORIDE SERPL-SCNC: 95 MMOL/L (ref 96–108)
CHOLEST SERPL-MCNC: 107 MG/DL
CO2 SERPL-SCNC: 24 MMOL/L (ref 21–32)
CREAT SERPL-MCNC: 0.91 MG/DL (ref 0.6–1.3)
DOP CALC LVOT AREA: 3.8 CM2
DOP CALC LVOT DIAMETER: 2.2 CM
E WAVE DECELERATION TIME: 114 MS
ERYTHROCYTE [DISTWIDTH] IN BLOOD BY AUTOMATED COUNT: 13.9 % (ref 11.6–15.1)
EST. AVERAGE GLUCOSE BLD GHB EST-MCNC: 108 MG/DL
FRACTIONAL SHORTENING: 27 % (ref 28–44)
GFR SERPL CREATININE-BSD FRML MDRD: 85 ML/MIN/1.73SQ M
GLUCOSE SERPL-MCNC: 135 MG/DL (ref 65–140)
HBA1C MFR BLD: 5.4 %
HCT VFR BLD AUTO: 31.2 % (ref 36.5–49.3)
HDLC SERPL-MCNC: 40 MG/DL
HGB BLD-MCNC: 10.8 G/DL (ref 12–17)
INTERVENTRICULAR SEPTUM IN DIASTOLE (PARASTERNAL SHORT AXIS VIEW): 1.5 CM
INTERVENTRICULAR SEPTUM: 1.5 CM (ref 0.6–1.1)
IVC: 3.7 MM
LAAS-AP2: 29.6 CM2
LAAS-AP4: 26.2 CM2
LDLC SERPL CALC-MCNC: 50 MG/DL (ref 0–100)
LEFT ATRIUM SIZE: 4.5 CM
LEFT INTERNAL DIMENSION IN SYSTOLE: 3.5 CM (ref 2.1–4)
LEFT VENTRICLE DIASTOLIC VOLUME (MOD BIPLANE): 197 ML
LEFT VENTRICLE SYSTOLIC VOLUME (MOD BIPLANE): 103 ML
LEFT VENTRICULAR INTERNAL DIMENSION IN DIASTOLE: 4.8 CM (ref 3.5–6)
LEFT VENTRICULAR POSTERIOR WALL IN END DIASTOLE: 1.4 CM
LEFT VENTRICULAR STROKE VOLUME: 58 ML
LV EF: 48 %
LVSV (TEICH): 58 ML
MAGNESIUM SERPL-MCNC: 2.2 MG/DL (ref 1.9–2.7)
MCH RBC QN AUTO: 36.6 PG (ref 26.8–34.3)
MCHC RBC AUTO-ENTMCNC: 34.6 G/DL (ref 31.4–37.4)
MCV RBC AUTO: 106 FL (ref 82–98)
MV PEAK A VEL: 0.4 M/S
MV PEAK E VEL: 60 CM/S
MV STENOSIS PRESSURE HALF TIME: 33 MS
MV VALVE AREA P 1/2 METHOD: 6.67 CM2
NONHDLC SERPL-MCNC: 67 MG/DL
NRBC BLD AUTO-RTO: 0 /100 WBCS
P AXIS: 80 DEGREES
P AXIS: 85 DEGREES
PLATELET # BLD AUTO: 163 THOUSANDS/UL (ref 149–390)
PMV BLD AUTO: 9.8 FL (ref 8.9–12.7)
POTASSIUM SERPL-SCNC: 4 MMOL/L (ref 3.5–5.3)
PR INTERVAL: 238 MS
PR INTERVAL: 242 MS
PROT SERPL-MCNC: 7.6 G/DL (ref 6.4–8.4)
QRS AXIS: 72 DEGREES
QRS AXIS: 72 DEGREES
QRS AXIS: 86 DEGREES
QRSD INTERVAL: 92 MS
QRSD INTERVAL: 92 MS
QRSD INTERVAL: 94 MS
QT INTERVAL: 414 MS
QT INTERVAL: 671 MS
QT INTERVAL: 746 MS
QTC INTERVAL: 577 MS
QTC INTERVAL: 671 MS
QTC INTERVAL: 714 MS
RA PRESSURE ESTIMATED: 15 MMHG
RBC # BLD AUTO: 2.95 MILLION/UL (ref 3.88–5.62)
RIGHT ATRIUM AREA SYSTOLE A4C: 21.4 CM2
RIGHT VENTRICLE ID DIMENSION: 5.2 CM
RV PSP: 73 MMHG
SL CV LEFT ATRIUM LENGTH A2C: 6.3 CM
SL CV LV EF: 40
SL CV PED ECHO LEFT VENTRICLE DIASTOLIC VOLUME (MOD BIPLANE) 2D: 110 ML
SL CV PED ECHO LEFT VENTRICLE SYSTOLIC VOLUME (MOD BIPLANE) 2D: 52 ML
SODIUM SERPL-SCNC: 130 MMOL/L (ref 135–147)
T WAVE AXIS: 124 DEGREES
T WAVE AXIS: 180 DEGREES
T WAVE AXIS: 212 DEGREES
TR MAX PG: 58 MMHG
TR PEAK VELOCITY: 3.8 M/S
TRICUSPID ANNULAR PLANE SYSTOLIC EXCURSION: 2.4 CM
TRICUSPID VALVE PEAK REGURGITATION VELOCITY: 3.79 M/S
TRIGL SERPL-MCNC: 87 MG/DL
VENTRICULAR RATE: 117 BPM
VENTRICULAR RATE: 55 BPM
VENTRICULAR RATE: 60 BPM
WBC # BLD AUTO: 26.79 THOUSAND/UL (ref 4.31–10.16)

## 2023-03-07 PROCEDURE — 05HC33Z INSERTION OF INFUSION DEVICE INTO LEFT BASILIC VEIN, PERCUTANEOUS APPROACH: ICD-10-PCS | Performed by: INTERNAL MEDICINE

## 2023-03-07 PROCEDURE — 5A1223Z PERFORMANCE OF CARDIAC PACING, CONTINUOUS: ICD-10-PCS | Performed by: INTERNAL MEDICINE

## 2023-03-07 DEVICE — PERCLOSE™ PROSTYLE™ SUTURE-MEDIATED CLOSURE AND REPAIR SYSTEM
Type: IMPLANTABLE DEVICE | Status: FUNCTIONAL
Brand: PERCLOSE™ PROSTYLE™

## 2023-03-07 DEVICE — ANGIO-SEAL VIP VASCULAR CLOSURE DEVICE
Type: IMPLANTABLE DEVICE | Status: FUNCTIONAL
Brand: ANGIO-SEAL

## 2023-03-07 RX ORDER — HEPARIN SODIUM 1000 [USP'U]/ML
INJECTION, SOLUTION INTRAVENOUS; SUBCUTANEOUS CODE/TRAUMA/SEDATION MEDICATION
Status: DISCONTINUED | OUTPATIENT
Start: 2023-03-07 | End: 2023-03-07 | Stop reason: HOSPADM

## 2023-03-07 RX ORDER — FOLIC ACID 1 MG/1
1 TABLET ORAL DAILY
Status: DISCONTINUED | OUTPATIENT
Start: 2023-03-08 | End: 2023-03-13 | Stop reason: HOSPADM

## 2023-03-07 RX ORDER — LANOLIN ALCOHOL/MO/W.PET/CERES
100 CREAM (GRAM) TOPICAL DAILY
Status: DISCONTINUED | OUTPATIENT
Start: 2023-03-08 | End: 2023-03-13 | Stop reason: HOSPADM

## 2023-03-07 RX ORDER — ASPIRIN 81 MG/1
81 TABLET, CHEWABLE ORAL DAILY
Status: DISCONTINUED | OUTPATIENT
Start: 2023-03-08 | End: 2023-03-07 | Stop reason: HOSPADM

## 2023-03-07 RX ORDER — MONTELUKAST SODIUM 10 MG/1
10 TABLET ORAL
Status: DISCONTINUED | OUTPATIENT
Start: 2023-03-07 | End: 2023-03-13 | Stop reason: HOSPADM

## 2023-03-07 RX ORDER — FENTANYL CITRATE 50 UG/ML
INJECTION, SOLUTION INTRAMUSCULAR; INTRAVENOUS CODE/TRAUMA/SEDATION MEDICATION
Status: DISCONTINUED | OUTPATIENT
Start: 2023-03-07 | End: 2023-03-07 | Stop reason: HOSPADM

## 2023-03-07 RX ORDER — ENOXAPARIN SODIUM 100 MG/ML
40 INJECTION SUBCUTANEOUS DAILY
Status: DISCONTINUED | OUTPATIENT
Start: 2023-03-08 | End: 2023-03-10

## 2023-03-07 RX ORDER — MIDAZOLAM HYDROCHLORIDE 2 MG/2ML
INJECTION, SOLUTION INTRAMUSCULAR; INTRAVENOUS CODE/TRAUMA/SEDATION MEDICATION
Status: DISCONTINUED | OUTPATIENT
Start: 2023-03-07 | End: 2023-03-07 | Stop reason: HOSPADM

## 2023-03-07 RX ORDER — PANTOPRAZOLE SODIUM 40 MG/1
40 TABLET, DELAYED RELEASE ORAL
Status: DISCONTINUED | OUTPATIENT
Start: 2023-03-08 | End: 2023-03-13 | Stop reason: HOSPADM

## 2023-03-07 RX ORDER — LIDOCAINE HYDROCHLORIDE 10 MG/ML
INJECTION, SOLUTION EPIDURAL; INFILTRATION; INTRACAUDAL; PERINEURAL CODE/TRAUMA/SEDATION MEDICATION
Status: DISCONTINUED | OUTPATIENT
Start: 2023-03-07 | End: 2023-03-07 | Stop reason: HOSPADM

## 2023-03-07 RX ORDER — FOLIC ACID 1 MG/1
1 TABLET ORAL DAILY
Status: CANCELLED | OUTPATIENT
Start: 2023-03-08

## 2023-03-07 RX ORDER — NITROGLYCERIN 20 MG/100ML
INJECTION INTRAVENOUS CODE/TRAUMA/SEDATION MEDICATION
Status: DISCONTINUED | OUTPATIENT
Start: 2023-03-07 | End: 2023-03-07 | Stop reason: HOSPADM

## 2023-03-07 RX ORDER — ENOXAPARIN SODIUM 100 MG/ML
40 INJECTION SUBCUTANEOUS DAILY
Status: CANCELLED | OUTPATIENT
Start: 2023-03-08

## 2023-03-07 RX ORDER — SODIUM CHLORIDE 9 MG/ML
100 INJECTION, SOLUTION INTRAVENOUS CONTINUOUS
Status: DISCONTINUED | OUTPATIENT
Start: 2023-03-07 | End: 2023-03-09

## 2023-03-07 RX ORDER — PANTOPRAZOLE SODIUM 40 MG/1
40 TABLET, DELAYED RELEASE ORAL
Status: CANCELLED | OUTPATIENT
Start: 2023-03-08

## 2023-03-07 RX ORDER — VERAPAMIL HCL 2.5 MG/ML
AMPUL (ML) INTRAVENOUS CODE/TRAUMA/SEDATION MEDICATION
Status: DISCONTINUED | OUTPATIENT
Start: 2023-03-07 | End: 2023-03-07 | Stop reason: HOSPADM

## 2023-03-07 RX ORDER — ATORVASTATIN CALCIUM 20 MG/1
20 TABLET, FILM COATED ORAL
Status: DISCONTINUED | OUTPATIENT
Start: 2023-03-08 | End: 2023-03-13 | Stop reason: HOSPADM

## 2023-03-07 RX ORDER — LIDOCAINE HYDROCHLORIDE ANHYDROUS AND DEXTROSE MONOHYDRATE .8; 5 G/100ML; G/100ML
1 INJECTION, SOLUTION INTRAVENOUS CONTINUOUS
Status: DISCONTINUED | OUTPATIENT
Start: 2023-03-07 | End: 2023-03-07 | Stop reason: HOSPADM

## 2023-03-07 RX ORDER — MAGNESIUM SULFATE 1 G/100ML
INJECTION INTRAVENOUS
Status: COMPLETED
Start: 2023-03-07 | End: 2023-03-07

## 2023-03-07 RX ORDER — LORAZEPAM 2 MG/ML
INJECTION INTRAMUSCULAR
Status: COMPLETED
Start: 2023-03-07 | End: 2023-03-07

## 2023-03-07 RX ORDER — LANOLIN ALCOHOL/MO/W.PET/CERES
100 CREAM (GRAM) TOPICAL DAILY
Status: CANCELLED | OUTPATIENT
Start: 2023-03-08

## 2023-03-07 RX ORDER — LIDOCAINE HYDROCHLORIDE ANHYDROUS AND DEXTROSE MONOHYDRATE .8; 5 G/100ML; G/100ML
1 INJECTION, SOLUTION INTRAVENOUS CONTINUOUS
Status: CANCELLED | OUTPATIENT
Start: 2023-03-07

## 2023-03-07 RX ORDER — CEFTRIAXONE 1 G/50ML
1000 INJECTION, SOLUTION INTRAVENOUS EVERY 24 HOURS
Status: CANCELLED | OUTPATIENT
Start: 2023-03-08

## 2023-03-07 RX ORDER — ASPIRIN 81 MG/1
81 TABLET, CHEWABLE ORAL DAILY
Status: CANCELLED | OUTPATIENT
Start: 2023-03-08

## 2023-03-07 RX ORDER — SODIUM CHLORIDE 9 MG/ML
100 INJECTION, SOLUTION INTRAVENOUS CONTINUOUS
Status: DISCONTINUED | OUTPATIENT
Start: 2023-03-07 | End: 2023-03-07 | Stop reason: HOSPADM

## 2023-03-07 RX ORDER — ATORVASTATIN CALCIUM 20 MG/1
20 TABLET, FILM COATED ORAL
Status: CANCELLED | OUTPATIENT
Start: 2023-03-08

## 2023-03-07 RX ORDER — DEXMEDETOMIDINE HYDROCHLORIDE 4 UG/ML
.1-1.3 INJECTION, SOLUTION INTRAVENOUS
Status: DISCONTINUED | OUTPATIENT
Start: 2023-03-07 | End: 2023-03-09

## 2023-03-07 RX ORDER — LANOLIN ALCOHOL/MO/W.PET/CERES
6 CREAM (GRAM) TOPICAL
Status: DISCONTINUED | OUTPATIENT
Start: 2023-03-07 | End: 2023-03-13 | Stop reason: HOSPADM

## 2023-03-07 RX ORDER — LANOLIN ALCOHOL/MO/W.PET/CERES
6 CREAM (GRAM) TOPICAL
Status: CANCELLED | OUTPATIENT
Start: 2023-03-07

## 2023-03-07 RX ORDER — MONTELUKAST SODIUM 10 MG/1
10 TABLET ORAL
Status: CANCELLED | OUTPATIENT
Start: 2023-03-07

## 2023-03-07 RX ORDER — ACETAMINOPHEN 325 MG/1
650 TABLET ORAL EVERY 6 HOURS PRN
Status: DISCONTINUED | OUTPATIENT
Start: 2023-03-07 | End: 2023-03-13 | Stop reason: HOSPADM

## 2023-03-07 RX ORDER — LORAZEPAM 2 MG/ML
2 INJECTION INTRAMUSCULAR ONCE
Status: COMPLETED | OUTPATIENT
Start: 2023-03-07 | End: 2023-03-07

## 2023-03-07 RX ORDER — LIDOCAINE HYDROCHLORIDE ANHYDROUS AND DEXTROSE MONOHYDRATE .8; 5 G/100ML; G/100ML
1 INJECTION, SOLUTION INTRAVENOUS CONTINUOUS
Status: DISCONTINUED | OUTPATIENT
Start: 2023-03-07 | End: 2023-03-09

## 2023-03-07 RX ORDER — ACETAMINOPHEN 325 MG/1
650 TABLET ORAL EVERY 6 HOURS PRN
Status: CANCELLED | OUTPATIENT
Start: 2023-03-07

## 2023-03-07 RX ORDER — ASPIRIN 81 MG/1
81 TABLET, CHEWABLE ORAL DAILY
Status: DISCONTINUED | OUTPATIENT
Start: 2023-03-08 | End: 2023-03-13

## 2023-03-07 RX ORDER — SODIUM CHLORIDE 9 MG/ML
100 INJECTION, SOLUTION INTRAVENOUS CONTINUOUS
Status: CANCELLED | OUTPATIENT
Start: 2023-03-07

## 2023-03-07 RX ADMIN — MELATONIN 6 MG: at 21:18

## 2023-03-07 RX ADMIN — DEXMEDETOMIDINE HYDROCHLORIDE 0.5 MCG/KG/HR: 400 INJECTION INTRAVENOUS at 19:23

## 2023-03-07 RX ADMIN — CEFTRIAXONE 1000 MG: 1 INJECTION, SOLUTION INTRAVENOUS at 17:01

## 2023-03-07 RX ADMIN — IOHEXOL 85 ML: 350 INJECTION, SOLUTION INTRAVENOUS at 15:32

## 2023-03-07 RX ADMIN — MAGNESIUM SULFATE HEPTAHYDRATE 1 G: 1 INJECTION, SOLUTION INTRAVENOUS at 10:03

## 2023-03-07 RX ADMIN — ENOXAPARIN SODIUM 40 MG: 40 INJECTION SUBCUTANEOUS at 08:16

## 2023-03-07 RX ADMIN — MULTIPLE VITAMINS W/ MINERALS TAB 1 TABLET: TAB ORAL at 08:17

## 2023-03-07 RX ADMIN — THIAMINE HCL TAB 100 MG 100 MG: 100 TAB at 08:16

## 2023-03-07 RX ADMIN — PANTOPRAZOLE SODIUM 40 MG: 40 TABLET, DELAYED RELEASE ORAL at 05:51

## 2023-03-07 RX ADMIN — LORAZEPAM 2 MG: 2 INJECTION INTRAMUSCULAR at 17:13

## 2023-03-07 RX ADMIN — FOLIC ACID 1 MG: 1 TABLET ORAL at 08:17

## 2023-03-07 RX ADMIN — MONTELUKAST 10 MG: 10 TABLET, FILM COATED ORAL at 21:18

## 2023-03-07 RX ADMIN — SODIUM CHLORIDE 100 ML/HR: 0.9 INJECTION, SOLUTION INTRAVENOUS at 18:21

## 2023-03-07 RX ADMIN — LORAZEPAM 2 MG: 2 INJECTION INTRAMUSCULAR; INTRAVENOUS at 17:13

## 2023-03-07 RX ADMIN — METOPROLOL TARTRATE 25 MG: 25 TABLET, FILM COATED ORAL at 08:16

## 2023-03-07 RX ADMIN — LIDOCAINE HYDROCHLORIDE ANHYDROUS AND DEXTROSE MONOHYDRATE 1 MG/MIN: .8; 5 INJECTION, SOLUTION INTRAVENOUS at 11:24

## 2023-03-07 RX ADMIN — LIDOCAINE HYDROCHLORIDE ANHYDROUS AND DEXTROSE MONOHYDRATE 1 MG/MIN: .8; 5 INJECTION, SOLUTION INTRAVENOUS at 18:21

## 2023-03-07 RX ADMIN — SODIUM CHLORIDE 100 ML/HR: 0.9 INJECTION, SOLUTION INTRAVENOUS at 17:00

## 2023-03-07 RX ADMIN — ASPIRIN 81 MG: 81 TABLET, COATED ORAL at 08:20

## 2023-03-07 NOTE — ASSESSMENT & PLAN NOTE
· This is a 82-year-old male presented elevated white blood cell count, fevers, with possible urinary source given history of straight cathing for genic bladder  · Started on Rocephin  · Monitor culture data

## 2023-03-07 NOTE — DISCHARGE SUMMARY
[address] [facility]  Discharge- Amauri Ricci 1950, 81 y o  male MRN: [id number]  Unit/Bed#: ICU 04 Encounter: [id number]  Primary Care Provider: Ginger Santoyo MD   Date and time admitted to hospital: 3/6/2023  1:45 PM    * Ventricular fibrillation [facility])  Assessment & Plan    · During echocardiogram today patient went into torsades/VT/V-fib  · QT on recent EKG was 671 ms  Repeat EKG at 1545 on March 7, 2023 reveals paced rhythm with QT/QTc of 446/543  · No amiodarone secondary to prolonged QT, lidocaine drip started  · No defibrillation needed as patient came out of the rhythm quickly  · Will need cardiac cath to evaluate for obstructive coronary artery disease/ischemia  · Will need ICD placement -answered to Mo  · Patient evaluated in the Cath Lab and no flow significant atherosclerotic lesions were identified  Temporary pacing wire placed for recurrent torsades and right Femoral Artery  · Immediately prior to the catheterization procedure the patient developed an arrhythmia that appeared to be torsades in nature  Unresponsive episode  Assessment & Plan  · This is likely secondary to above ventricular fibrillation/V  Tach/torsades  · Neurology on consult and reports no need for EEG that this is likely secondary to cardiac cause  No need for antiepileptic medications or benzodiazepines  Seizure-like activity ([facility] [address] )  Assessment & Plan  · No witnessed seizure activity at the time of arrhythmic event  · Neurology on consult  · EEG unremarkable for altered brainwave patterns as per neurology, neurology believes this is likely secondary to cardiac cause    · Neurology states that there is no indication for repeat EEG, no indication for antiepileptic medications, no indication for benzodiazepines  · Continue to monitor neurologic status closely  · Neurochecks with vital signs    Narrow complex tachycardia ([address] )  Assessment & Plan  · Secondary to above V-fib/V  tach/torsades event  · Cardiology on consult  · Cath completed with findings outlined above  · Temporary pacing wire placed in right femoral approach  · Patient transferred to [place] for ICD placement    Paroxysmal A-fib [facility])  Assessment & Plan  · No events of paroxysmal A-fib at this time  · Cardiology on consult  · Pending cardiac cath    Prolonged QT interval  Assessment & Plan  · Recent ECG QT was 671 ms, repeat EEG CG on 3/7/2023 at 1545 reveals QT/QTc of 446/543  Paced rhythm on EKG  · Avoid amiodarone  · Monitor cardiac status closely  · Cardiology on consult    Sepsis [facility])  Assessment & Plan  · This is a 82-year-old male presented elevated white blood cell count, fevers, with possible urinary source given history of straight cathing for neurogenic bladder  · Started on Rocephin  · Monitor culture data    Hypercholesterolemia  Assessment & Plan  · Continue atorvastatin    CLL (chronic lymphocytic leukemia) ([facility] [address] )  Assessment & Plan  · Patient with known CLL, follows with oncology as an outpatient  · Leukocytosis increased above baseline, baseline appears to be 14-18,000  · This is likely secondary to underlying infection-urinary source  Essential hypertension  Assessment & Plan  · Patient is on Norvasc 5 mg daily and Lopressor 25 mg twice daily at home  · Continue metoprolol per cardiology's recommendations    Pulmonary hypertension [facility])  Assessment & Plan  · January 2021 echocardiogram suggested pulmonary pressure 75 meters of mercury  · 10/3/2018 repeat echo cardiogram suggested pulmonary systolic pressure of 60 mmHg  · Repeat echocardiogram 3/7/2023 reveals EF of 40%  The echocardiogram was limited by the patient having a torsades event during the performance of the echocardiogram   Found to have hypokinetic basal, inferior septal, mid anterior septal, mid inferior septal, mid inferior, apical anterior, apical septal, and apical lateral segments      Alcohol use  Assessment & Plan  · Reports drinking 2 kathi robles day -CIWA protocol ordered            Medical Problems     Resolved Problems  Date Reviewed: 3/7/2023          Resolved    Hyponatremia 3/7/2023     Resolved by  MATHEW Chacon          Admission Date:   Admission Orders (From admission, onward)     Ordered        03/06/23 1547  INPATIENT ADMISSION  Once                        Admitting Diagnosis: Ventricular fibrillation ([address] ) [I49 01]  Seizure ([address] ) [R56 9]  Fever [R50 9]    HPI:   This is a 68-year-old male who presented to the emergency department with loss of consciousness  Per the wife as reported to the internal medicine group the patient was watching movies earlier in the day and he turned and told her he was not feeling well and immediately lost consciousness  EMS arrived and found the patient to be unresponsive, the cardiac monitor showed V-fib, and the patient was shocked x1 and underwent CPR  Upon arrival to the emergency department the patient was breathing spontaneously, awake, and lethargic  The patient had a similar episode approximately 1 year ago with concern for possible seizure-like activity  Initial EKG in the emergency department revealed A-fib with RVR  The patient denied any chest pain at that time  The patient was admitted to the internal medicine service as a stepdown level 2 with close observation and a cardiology consult  Procedures Performed:   Orders Placed This Encounter   Procedures   • Cardiac catheterization   • Midline Insertion       Summary of Hospital Course: On the morning of 3/7/2023 while the patient was under the care of the internal medicine service on the medical surgical stepdown 2 unit and while having an echocardiogram the patient developed a sudden onset of V  tach which quickly deteriorated to V-fib/torsades  The patient did not lose consciousness  The patient was diaphoretic with associated shortness of breath without chest pain    In the progress of obtaining ACLS equipment and 1 g of magnesium, the patient spontaneously converted to a sinus rhythm that quickly deteriorated to bigeminy, then a wide-complex bradycardia and then converted back to a sinus rhythm without medication being delivered  Cardiology arrived at bedside and 1 g of magnesium was administered  Lidocaine drip was started  The patient is now moved to the critical care service for ongoing critical care management  The patient underwent a cardiac cath and had a temporary pes planus or wire inserted via the right femoral approach  Really prior to the cardiac cath the patient had another event of torsades  During the cardiac cath procedure the patient's vessels did not show evidence of significant flow obstruction  The patient was transferred back to the ICU postcardiac cath and will be discharged and readmitted to [facility] for ICD placement  Complications: V-fib/V  tach/torsades    Condition at Discharge: serious         Discharge instructions/Information to patient and family:   See after visit summary for information provided to patient and family  Provisions for Follow-Up Care:  See after visit summary for information related to follow-up care and any pertinent home health orders  PCP: Tor Rapp MD    Disposition: Discharged and readmitted to [place] [id number]    Planned Readmission: Yes discharged and readmitted to [place] [address]    Discharge Statement   I spent 30 minutes discharging the patient  This time was spent on the day of discharge  I had direct contact with the patient on the day of discharge  Additional documentation is required if more than 30 minutes were spent on discharge  Discharge Medications:  See after visit summary for reconciled discharge medications provided to patient and family

## 2023-03-07 NOTE — ASSESSMENT & PLAN NOTE
· During echocardiogram today patient went into torsades/VT/V-fib  · QT on recent EKG was 671 ms  · No amiodarone secondary to prolonged QT, lidocaine drip started  · No defibrillation needed as patient came out of the rhythm quickly  · Will need cardiac cath to evaluate for obstructive coronary artery disease/ischemia  · Will need ICD placement

## 2023-03-07 NOTE — ASSESSMENT & PLAN NOTE
· This is likely secondary to above ventricular fibrillation/V  Tach/torsades  · Neurology on consult and reports no need for EEG that this is likely secondary to cardiac cause  No need for antiepileptic medications or benzodiazepines

## 2023-03-07 NOTE — ASSESSMENT & PLAN NOTE
· This is likely secondary to above ventricular fibrillation/V   Tach/torsades  · Neurology on consult

## 2023-03-07 NOTE — CONSULTS
Consultation - Neurology   Swati Rae 81 y o  male MRN: [id number]  Unit/Bed#: ICU 13 Encounter: [id number]      Assessment/Plan   Unresponsive episode  Assessment & Plan  81year old male with history of CLL, pulmonary HTN, DIXIE, HTN, HLD, and recent workup in September 2022 for seizure activity (unclear if convulsive syncope from coughing fit versus provoked seizure from UTI/ETOH withdrawal     Presented yesterday 3/6 after being found unresponsive at home by significant other; there was also question of seizure-like activity associated with this event  Upon EMS arrival he was noted to be in V-fib requiring brief CPR and ROSC achievement; admitted to ICU for close cardiac/hemodynamic management  Unlikely that true epileptic seizure activity noted yesterday; convulsive syncope/LOC in the setting of V-fib is high in differential; UA is abnormal with pending culture, thus provoked seizure in the setting of UTI also in differential  Neuro exam this morning non-focal with seizure activity recurrence  Plan:  -CT head negative for acute intracranial pathology  -cardiology following closely status post v-fib   -telemetry monitoring ongoing    -2D echo pending   -reportedly with recurrence of V-fib this morning (brief LOC this morning with diaphoresis per nursing notes)  -discussed with attending, would check CTA head/neck when able to exclude basilar/other critical vessel abnormalities that could lead to LOC/hypoperfusion and syncope     -recent neurodiagnostics:   -Routine EEG September 2022: normal   -MRI brain with/without contrast October 2022: unrevealing (via [facility], imaging not available for review)  -no need for repeat MRI brain nor EEG at this time  -no need for AED initiation  -metabolic/infectious derangement monitoring and correction:   -afebrile, trending vitals   -chronic leukocytosis from CLL   -trending troponins   -hyponatremia at 130, otherwise normal CMP this morning    -UA with trace leukocytes, 10-20 WBC's, innumerable bacteria    -culture pending   -ETOH <10 (patient admitted to drinking 4 beers daily recently, including 3 yesterday prior to admission)   -Flu/COVID/RSV negative   -USD positive for THC    -CXR negative    Discussed plan of care with attending neurologist      Recommendations for outpatient neurological follow up have yet to be determined  History of Present Illness     Reason for Consult / Principal Problem: Questionable seizure like activity, unresponsive state with Ventricular fibrillation    HPI: Marsha Calderón is a 81 y o  male with history as mentioned above in assessment who neurology is asked to evaluate in regards to the above  History largely obtained via chart review and discussion with patient this morning:    Patient notes that he was of recently in a normal state of health, although felt "crummy" and tired yesterday morning; at some point reportedly patient became unresponsive; (per notes, eyes rolled back during this unresponsive state as well), EMS was called  On EMS arrival, patient with agonal breathing, cardiac monitoring during transport revealed V-fib with subsequent resuscitation, underwent 1 round of CPR with ROSC, no epi given  On arrival to ED patient was conversing somewhat, eyes open, tracking  Repeat EKG revealed A-fib with RVR  CT head without acute findings  Was admitted for close monitoring and cardiology evaluation  Patient does have recollection of EMS coming into his home and being brought in the ambulance to the ED yesterday  He denies and tongue bite nor incontinence with yesterday's events  In discussing with patient, he has not had any occurrences of seizure like activity since September 2022; he was seen by the neuro-hospitalist team at that time  Per those notes:  Event occurred after a coughing fit,  he had a loss of consciousness and whole body shaking, without any clear postictal state    EMS was called after that event and then he subsequently had another event afterwards with generalized shaking  It was unclear if this episode represented true seizure versus convulsive syncope after a coughing fit  He was not started on any AED as he also had questionable UTI and alcohol withdrawal at that time  EEG was normal      MRI brain was performed in October 2022 through [facility] and unrevealing for any significant pathology  Per patient, uses recreational marijuana every other day; drinks daily leading up to admission (typically about 4 beers per day of recently; yesterday had 3 beers prior to presentation)  Otherwise no recent illness/infection, medication changes  Consult to neurology  Consult performed by: Patti Huynh PA-C  Consult ordered by: April Acosta MD          Review of Systems   Constitutional: Negative  HENT: Negative  Eyes: Negative  Respiratory: Negative  Cardiovascular: Negative  Gastrointestinal: Negative  Genitourinary:        Self-caths   Musculoskeletal: Negative  Skin: Negative  Neurological: Positive for tremors and seizures  Negative for dizziness, syncope, facial asymmetry, speech difficulty, weakness, light-headedness, numbness and headaches  All other ROS reviewed and negative       Historical Information   Past Medical History:   Diagnosis Date   • Anxiety    • BPH (benign prostatic hypertrophy)    • Cancer (HCC)     CLL   • CLL (chronic lymphocytic leukemia) ([facility] [address] )     2009   • Colon polyp    • Concussion     Resolved: 08/22/16   • Depression    • Diverticulitis 1/13/2020 2014 CT done 11/19 12/19 CT done    • E coli bacteremia 6/27/2021 2/2 blood cultures with Patty Briceño appears to be the urine    • Epididymitis 5/19/2021 5/2021   • Gastrointestinal hemorrhage     Last assessed: 08/27/13   • GERD (gastroesophageal reflux disease)    • Hearing loss of aging    • Hiatal hernia    • History of transfusion    • Hyperlipidemia    • Hypertension    • Iron deficiency anemia    • Microscopic hematuria     Last assessed: 06/28/13   • OA (osteoarthritis)     right hip   • Pneumothorax    • Primary osteoarthritis of right hip 9/29/2017    He is status post right hip arthroplasty   • Prostatitis    • Pulmonary hypertension (HCC)    • Seasonal allergies    • Urinary tract infection associated with catheterization of urinary tract ([facility] [address] ) 2/5/2021    Pseudomonal UTI asymptomatic  Per Urology do not treat at this time  • Urinary tract infection associated with catheterization of urinary tract ([facility] [address] ) 2/5/2021    Pseudomonal UTI asymptomatic  Per Urology do not treat at this time    He did have urosepsis from E coli 7/21   • UTI (urinary tract infection)     in past   • Wears glasses      Past Surgical History:   Procedure Laterality Date   • BLADDER SURGERY     • COLONOSCOPY     • CYSTOSCOPY  10/09/2014    Diagnostic   • CYSTOSCOPY  06/29/2020   • FL CYSTOGRAM  08/15/2022   • FRACTURE SURGERY      left lower arm   • FRACTURE SURGERY      left femur   • HERNIA REPAIR     • JOINT REPLACEMENT Right     hip   • OTHER SURGICAL HISTORY  03/2011    Spinal anesthesia epidural   • MA ARTHRP ACETBLR/PROX FEM PROSTC AGRFT/ALGRFT Right 09/29/2017    Procedure: ARTHROPLASTY HIP TOTAL ANTERIOR;  Surgeon: Colletta Bouquet, MD;  Location: [facility];  Service: Orthopedics   • TONSILLECTOMY AND ADENOIDECTOMY     • TRANSURETHRAL RESECTION OF PROSTATE      x 2   • WRIST SURGERY       Social History   Social History     Substance and Sexual Activity   Alcohol Use Yes   • Alcohol/week: 10 0 standard drinks   • Types: 10 Cans of beer per week     Social History     Substance and Sexual Activity   Drug Use Yes   • Types: Marijuana    Comment: "medical marijuana"     E-Cigarette/Vaping   • E-Cigarette Use Never User      E-Cigarette/Vaping Substances   • Nicotine No    • THC No    • CBD No    • Flavoring No    • Other No    • Unknown No      Social History     Tobacco Use   Smoking Status Former   • Packs/day: 1 00   • Years: 15    • Pack years: 15    • Types: Cigarettes   • Start date: 67   • Quit date: 1980   • Years since quittin 5   Smokeless Tobacco Never     Family History:   Family History   Problem Relation Age of Onset   • No Known Problems Mother    • Heart attack Father    • Diabetes Brother    • Prostate cancer Brother        Review of previous medical records was completed  Meds/Allergies   current meds:   Current Facility-Administered Medications   Medication Dose Route Frequency   • acetaminophen (TYLENOL) tablet 650 mg  650 mg Oral Q6H PRN   • aspirin (ECOTRIN LOW STRENGTH) EC tablet 81 mg  81 mg Oral Daily   • atorvastatin (LIPITOR) tablet 20 mg  20 mg Oral Daily With Dinner   • cefTRIAXone (ROCEPHIN) IVPB (premix in dextrose) 1,000 mg 50 mL  1,000 mg Intravenous Q24H   • enoxaparin (LOVENOX) subcutaneous injection 40 mg  40 mg Subcutaneous Daily   • folic acid (FOLVITE) tablet 1 mg  1 mg Oral Daily   • LORazepam (ATIVAN) injection 1 mg  1 mg Intravenous Q4H PRN   • melatonin tablet 6 mg  6 mg Oral HS   • metoprolol tartrate (LOPRESSOR) tablet 25 mg  25 mg Oral Q12H CHET   • montelukast (SINGULAIR) tablet 10 mg  10 mg Oral HS   • multivitamin-minerals (CENTRUM) tablet 1 tablet  1 tablet Oral Daily   • pantoprazole (PROTONIX) EC tablet 40 mg  40 mg Oral Early Morning   • thiamine tablet 100 mg  100 mg Oral Daily    and PTA meds:   Prior to Admission Medications   Prescriptions Last Dose Informant Patient Reported? Taking?    Acetaminophen 500 MG   Yes No   Sig: Take 1,000 mg by mouth prn   Blood Pressure Monitoring (Omron 5 Series BP Monitor) PEE   Yes No   LORazepam (Ativan) 0 5 mg tablet   No No   Sig: Take 1 tablet (0 5 mg total) by mouth every 8 (eight) hours as needed for anxiety   Omega-3 Fatty Acids (FISH OIL) 1,000 mg   Yes No   Sig: Take by mouth daily   amLODIPine (NORVASC) 5 mg tablet   No No   Sig: Take 1 tablet (5 mg total) by mouth every evening   ascorbic acid (VITAMIN C) 250 MG CHEW   Yes No   Sig: Chew 250 mg 2 (two) times a day   aspirin 81 MG tablet   Yes No   Sig: Take 1 tablet by mouth daily   atorvastatin (LIPITOR) 20 mg tablet   No No   Sig: TAKE ONE TABLET BY MOUTH ONCE EVERY DAY   cetirizine (ZyrTEC) 10 mg tablet   Yes No   Sig: Take 10 mg by mouth as needed     metoprolol tartrate (LOPRESSOR) 25 mg tablet   No No   Sig: Take 1 tablet (25 mg total) by mouth every 12 (twelve) hours   montelukast (SINGULAIR) 10 mg tablet   No No   Sig: TAKE ONE TABLET BY MOUTH DAILY   omeprazole (PriLOSEC) 40 MG capsule   No No   Sig: Take one capsule by mouth once every day  sertraline (ZOLOFT) 50 mg tablet   No No   Sig: TAKE ONE TABLET BY MOUTH EVERY DAY   vitamin B-12 (CYANOCOBALAMIN) 500 MCG TABS   No No   Sig: Take 1 tablet (500 mcg total) by mouth daily      Facility-Administered Medications: None       Allergies   Allergen Reactions   • Codeine Other (See Comments)     agitated   • Sulfamethoxazole-Trimethoprim GI Intolerance and Abdominal Pain     upset stomach       Objective   Vitals:Blood pressure 113/72, pulse 60, temperature 97 8 °F (36 6 °C), temperature source Oral, resp  rate 19, height 5' 8" (1 727 m), weight 88 9 kg (196 lb), SpO2 97 %  ,Body mass index is 29 8 kg/m²  Intake/Output Summary (Last 24 hours) at 3/7/2023 0734  Last data filed at 3/7/2023 0400  Gross per 24 hour   Intake 2050 ml   Output 1540 ml   Net 510 ml       Invasive Devices: Invasive Devices     Peripheral Intravenous Line  Duration           Peripheral IV 03/06/23 Dorsal (posterior); Left Hand 1 day    Peripheral IV 03/06/23 Right Antecubital 1 day          Drain  Duration           Urethral Catheter Temperature probe 16 Fr  <1 day                Physical Exam  Constitutional:       Appearance: Normal appearance  HENT:      Head: Normocephalic and atraumatic     Eyes:      Extraocular Movements: Extraocular movements intact and EOM normal       Conjunctiva/sclera: Conjunctivae normal  Pupils: Pupils are equal, round, and reactive to light  Cardiovascular:      Rate and Rhythm: Normal rate  Pulmonary:      Effort: Pulmonary effort is normal    Abdominal:      General: There is no distension  Musculoskeletal:         General: Normal range of motion  Cervical back: Normal range of motion and neck supple  Skin:     General: Skin is warm and dry  Neurological:      Mental Status: He is alert and oriented to person, place, and time  Motor: Motor strength is normal       Coordination: Finger-Nose-Finger Test and Heel to Shin Test normal        Neurologic Exam     Mental Status   Oriented to person, place, and time  Awake, alert, slightly anxious but pleasant in conversation  Fully oriented, following commands  Cranial Nerves     CN II   Visual fields full to confrontation  CN III, IV, VI   Pupils are equal, round, and reactive to light  Extraocular motions are normal      CN V   Facial sensation intact  CN VII   Facial expression full, symmetric  CN VIII   CN VIII normal      CN IX, X   CN IX normal    CN X normal      CN XI   CN XI normal      CN XII   CN XII normal      No tongue bite seen  Motor Exam   Muscle bulk: normal  Overall muscle tone: normal  Right arm pronator drift: absent  Left arm pronator drift: absent    Strength   Strength 5/5 throughout  Sensory Exam   Light touch normal      Gait, Coordination, and Reflexes     Coordination   Finger to nose coordination: normal  Heel to shin coordination: normal    Tremor   Resting tremor: absent  Intention tremor: absent    Reflexes   Right plantar: upgoing  Left plantar: upgoing  Right ankle clonus: absent  Left ankle clonus: absent  Head tremor during conversation during exam  Tremulous in extremities with intention/action as well during motor and coordination testing  No clinical seizure activity seen throughout exam      Gait deferred with seizure precautions/ongoing cardiac monitoring  Lab Results:   CBC:   Results from last 7 days   Lab Units 03/07/23  0605 03/06/23  1357   WBC Thousand/uL 26 79* 49 68*   RBC Million/uL 2 95* 3 66*   HEMOGLOBIN g/dL 10 8* 13 2   HEMATOCRIT % 31 2* 38 6   MCV fL 106* 106*   PLATELETS Thousands/uL 163 228   , BMP/CMP:   Results from last 7 days   Lab Units 03/07/23  0605 03/06/23  1357   SODIUM mmol/L 130* 133*   POTASSIUM mmol/L 4 0 4 1   CHLORIDE mmol/L 95* 98   CO2 mmol/L 24 20*   BUN mg/dL 14 10   CREATININE mg/dL 0 91 0 96   CALCIUM mg/dL 8 4 9 4   AST U/L 31 49*   ALT U/L 30 39   ALK PHOS U/L 66 88   EGFR ml/min/1 73sq m 85 79   , Vitamin B12:   , HgBA1C:   , TSH:   Results from last 7 days   Lab Units 03/06/23  1357   TSH 3RD GENERATON uIU/mL 1 754   , Coagulation:   Results from last 7 days   Lab Units 03/06/23  1357   INR  1 25*   , Lipid Profile:   Results from last 7 days   Lab Units 03/07/23  0605   HDL mg/dL 40   LDL CALC mg/dL 50   TRIGLYCERIDES mg/dL 87   , Ammonia:   Results from last 7 days   Lab Units 03/06/23  1357   AMMONIA umol/L 57   , Urinalysis:   Results from last 7 days   Lab Units 03/06/23  1424   COLOR UA  Yellow   CLARITY UA  Slightly Cloudy   SPEC GRAV UA  1 025   PH UA  6 0   LEUKOCYTES UA  Trace*   NITRITE UA  Negative   GLUCOSE UA mg/dl Negative   KETONES UA mg/dl Negative   BILIRUBIN UA  Negative   BLOOD UA  Small*   , Drug Screen:   Results from last 7 days   Lab Units 03/06/23  1424   BARBITURATE UR  Negative   BENZODIAZEPINE UR  Negative   THC UR  Positive*   COCAINE UR  Negative   METHADONE URINE  Negative   OPIATE UR  Negative   PCP UR  Negative   , Medication Drug Levels:       Invalid input(s): CARBAMAZEPINE,  PHENOBARB, LACOSAMIDE, OXCARBAZEPINE  Imaging Studies: I have personally reviewed pertinent films in PACS   CT head without contrast   Final Result by Jennifer Schmidt MD (03/06 6524)      No acute intracranial abnormality  Nonemergent findings above            Workstation performed: [id number]         XR chest 1 view portable   Final Result by Kalie Diego MD (03/06 1881)   No acute cardiopulmonary findings  Workstation performed: [id number]             EKG, Pathology, and Other Studies: I have personally reviewed pertinent reports  VTE Prophylaxis: Sequential compression device (Venodyne)  and Enoxaparin (Lovenox)    Code Status: Level 1 - Full Code     Total time spent today 45 minutes

## 2023-03-07 NOTE — CASE MANAGEMENT
Case Management Discharge Planning Note    Patient name Danae Villasenor ICU 04/ICU 04 MRN [id number]  : 1950 Date 3/7/2023       Current Admission Date: 3/6/2023  Current Admission Diagnosis:Ventricular fibrillation [facility])   Patient Active Problem List    Diagnosis Date Noted   • Unresponsive episode 2023   • Narrow complex tachycardia ([address] ) 2023   • Paroxysmal A-fib ([address] ) 2023   • Prolonged QT interval 2023   • Sepsis ([address] ) 2023   • Ventricular fibrillation ([address] ) 2023   • Seizure-like activity ([address] ) 2022   • Alcohol use 2022   • Diverticulum of bladder 2022   • Obesity (BMI 30 0-34 9) 2022   • Encysted hydrocele 2021   • Autoimmune hemolytic anemia ([address] ) 2021   • GAVE (gastric antral vascular ectasia) 2021   • BPH with obstruction/lower urinary tract symptoms 2020   • Hypercholesterolemia 2019   • DIXIE not currently treated 10/17/2018   • DDD (degenerative disc disease), cervical 08/15/2018   • Essential hypertension 2017   • CLL (chronic lymphocytic leukemia) ([facility] [address] ) 2017   • Iron deficiency anemia 2017   • Pulmonary hypertension ([address] ) 05/15/2017   • Spinal stenosis of lumbar region 05/15/2017   • Erectile dysfunction of non-organic origin 2013   • Esophageal reflux 2012   • Allergic rhinitis due to pollen 2012      LOS (days): 1  Geometric Mean LOS (GMLOS) (days): 5 00  Days to GMLOS:4     OBJECTIVE:  Risk of Unplanned Readmission Score: 18 29         Current admission status: Inpatient   Preferred Pharmacy:   [address], [address] [facility] [address] [place] [address]  Phone: [phone] Fax: [phone] [place] [id number] [facility] [address]  Phone: [phone] Fax: [phone]    Primary Care Provider: Alaina Patton MD    Primary Insurance: MEDICARE  Secondary Insurance: BLUE CROSS    DISCHARGE DETAILS:                                                    Treatment Team Recommendation: Acute Hospital Transfer  Discharge Destination Plan[de-identified] Acute Hospital Transfer  Transport at Discharge : [facility] Ambulance     Number/Name of Dispatcher: PACS ordered transport  Transported by [employer] and Unit #): [facility]  ETA of Transport (Date):  (TBD)                          Additional Comments: CM made aware patient will be transferring to [facility] for ICD,  date/time to be confirmed  CM filled out LMN and placed it in binder on ICU RN station  CM left date blank and requested that RN fill out date once transfer is confirmed

## 2023-03-07 NOTE — Clinical Note
The ICD GENERATOR [facility] MRI CRTD-ROCIO/DF4 device was inserted  The leads were placed into the connector and visually verified to be in correct position  Injury current obtained

## 2023-03-07 NOTE — ASSESSMENT & PLAN NOTE
· No witnessed seizure activity at the time of arrhythmic event  · Neurology on consult  · EEG unremarkable for altered brainwave patterns as per neurology, neurology believes this is likely secondary to cardiac cause    · Neurology states that there is no indication for repeat EEG, no indication for antiepileptic medications, no indication for benzodiazepines  · Continue to monitor neurologic status closely  · Neurochecks with vital signs

## 2023-03-07 NOTE — ASSESSMENT & PLAN NOTE
81year old male with history of CLL, pulmonary HTN, DIXIE, HTN, HLD, and recent workup in September 2022 for seizure activity (unclear if convulsive syncope from coughing fit versus provoked seizure from UTI/ETOH withdrawal     Presented yesterday 3/6 after being found unresponsive at home by significant other; there was also question of seizure-like activity associated with this event  Upon EMS arrival he was noted to be in V-fib requiring brief CPR and ROSC achievement; admitted to ICU for close cardiac/hemodynamic management  Unclear if true seizure activity noted yesterday; convulsive syncope in the setting of V-fib remains in differential; UA is abnormal with pending culture, thus provoked seizure in the setting of UTI also in differential  Neuro exam this morning non-focal with seizure activity recurrence       Plan:  -CT head negative for acute intracranial pathology  -cardiology following closely status post v-fib   -telemetry monitoring ongoing    -2D echo pending  -recent neurodiagnostics:   -Routine EEG September 2022: normal   -MRI brain with/without contrast October 2022: unrevealing (via [facility], imaging not available for review)  -metabolic/infectious derangement monitoring and correction:   -afebrile, trending vitals   -chronic leukocytosis from CLL   -trending troponins   -hyponatremia at 130, otherwise normal CMP this morning    -UA with trace leukocytes, 10-20 WBC's, innumerable bacteria    -culture pending   -ETOH <10 (patient admitted to drinking 4 beers daily recently, including 3 yesterday prior to admission)   -Flu/COVID/RSV negative   -USD positive for THC    -CXR negative

## 2023-03-07 NOTE — Clinical Note
Temporary wire removed by A  Kush, RN  Sheath removed and manual pressure held until hemostasis    Gauze and tegaderm applied

## 2023-03-07 NOTE — ASSESSMENT & PLAN NOTE
· No witnessed seizure activity at the time of arrhythmic event  · Neurology on consult  · EEG unremarkable for altered brainwave patterns as per neurology, neurology believes this is likely secondary to cardiac cause    · Neurology states that there is no indication for repeat EEG, no indication for AED, no indication for benzodiazepines  · Continue to monitor neurologic status closely  · Neurochecks with vital signs

## 2023-03-07 NOTE — ASSESSMENT & PLAN NOTE
· During echocardiogram today patient went into torsades/VT/V-fib  · QT on recent EKG was 671 ms  Repeat EKG at 1545 on March 7, 2023 reveals paced rhythm with QT/QTc of 446/543  · No amiodarone secondary to prolonged QT, lidocaine drip started  · No defibrillation needed as patient came out of the rhythm quickly  · Will need cardiac cath to evaluate for obstructive coronary artery disease/ischemia  · Will need ICD placement -answered to [facility]  · Patient evaluated in the Cath Lab and no flow significant atherosclerotic lesions were identified  Temporary pacing wire placed for recurrent torsades and right Femoral Artery  · Immediately prior to the catheterization procedure the patient developed an arrhythmia that appeared to be torsades in nature

## 2023-03-07 NOTE — PROCEDURES
Midline Insertion    Date/Time: 3/7/2023 10:00 AM  Performed by: Petty Dow RN  Authorized by: Bart Sanderson MD     Patient location:  ICU  Consent:     Consent obtained: per protocol no consent needed  Pre-procedure details:     Hand hygiene: Hand hygiene performed prior to insertion      Sterile barrier technique: All elements of maximal sterile technique followed      Skin preparation:  ChloraPrep    Skin preparation agent: Skin preparation agent completely dried prior to procedure    Indications:     Midline indications: new site    Anesthesia (see MAR for exact dosages): Anesthesia method:  None  Procedure details:     Location:  Left basilic    Laterality:  Left    Catheter size:  18 gauge    Landmarks identified: yes      Ultrasound guidance: yes      Sterile ultrasound techniques: Sterile gel and sterile probe covers were used      Number of attempts:  1    Successful placement: yes      Catheter length (cm):  10    Lot number:  [id number]  Post-procedure details:     Post-procedure:  Dressing applied    Patient tolerance of procedure:   Tolerated well, no immediate complications

## 2023-03-07 NOTE — QUICK NOTE
Called and updated son Estelita Damico patient's clinical status and transfer to [facility] for permanent ICD placement

## 2023-03-07 NOTE — ASSESSMENT & PLAN NOTE
· January 2021 echocardiogram suggested pulmonary pressure 75 meters of mercury  · 10/3/2018 repeat echo cardiogram suggested pulmonary systolic pressure of 60 mmHg  · Repeat echocardiogram today is pending

## 2023-03-07 NOTE — ASSESSMENT & PLAN NOTE
· This is a 63-year-old male presented elevated white blood cell count, fevers, with possible urinary source given history of straight cathing for neurogenic bladder  · Started on Rocephin  · Monitor culture data

## 2023-03-07 NOTE — ASSESSMENT & PLAN NOTE
· Recent ECG QT was 671 ms, repeat EEG CG on 3/7/2023 at 1545 reveals QT/QTc of 446/543  Paced rhythm on EKG    · Avoid amiodarone  · Monitor cardiac status closely  · Cardiology on consult

## 2023-03-07 NOTE — PROGRESS NOTES
[facility]  ICU Admit Note - Nina English 1950, 81 y o  male MRN: [id number]  Unit/Bed#: ICU 04 Encounter: [id number]  Primary Care Provider: Kendall Guy MD   Date and time admitted to hospital: 3/6/2023  1:45 PM    * Ventricular fibrillation [facility])  Assessment & Plan    · During echocardiogram today patient went into torsades/VT/V-fib  · QT on recent EKG was 671 ms  · No amiodarone secondary to prolonged QT, lidocaine drip started  · No defibrillation needed as patient came out of the rhythm quickly  · Will need cardiac cath to evaluate for obstructive coronary artery disease/ischemia  · Will need ICD placement    Unresponsive episode  Assessment & Plan  · This is likely secondary to above ventricular fibrillation/V  Tach/torsades  · Neurology on consult    Seizure-like activity [facility])  Assessment & Plan  · No witnessed seizure activity at the time of arrhythmic event  · Neurology on consult  · EEG unremarkable for altered brainwave patterns as per neurology, neurology believes this is likely secondary to cardiac cause    · Neurology states that there is no indication for repeat EEG, no indication for AED, no indication for benzodiazepines  · Continue to monitor neurologic status closely  · Neurochecks with vital signs    Narrow complex tachycardia (HCC)  Assessment & Plan  · Secondary to above V-fib/V  tach/torsades event  · Cardiology on consult  · Plan for cardiac cath today and ICD placement    Paroxysmal A-fib (HCC)  Assessment & Plan  · No events of paroxysmal A-fib at this time  · Cardiology on consult  · Pending cardiac cath    Prolonged QT interval  Assessment & Plan  · Recent ECG QT was 671 ms  · Avoid amiodarone  · Monitor cardiac status closely  · Cardiology on consult    Sepsis [facility])  Assessment & Plan  · This is a 21-year-old male presented elevated white blood cell count, fevers, with possible urinary source given history of straight cathing for genic bladder  · Started on Rocephin  · Monitor culture data    Hypercholesterolemia  Assessment & Plan  · Continue atorvastatin    CLL (chronic lymphocytic leukemia) ([facility] )  Assessment & Plan  · Patient with known CLL, follows with oncology as an outpatient  · Leukocytosis increased above baseline, baseline appears to be 14-18,000  · This is likely secondary to underlying infection-urinary source  Essential hypertension  Assessment & Plan  · Patient is on Norvasc 5 mg daily and Lopressor 25 mg twice daily at home  · Continue metoprolol per cardiology's recommendations    Pulmonary hypertension ([facility] )  Assessment & Plan  · January 2021 echocardiogram suggested pulmonary pressure 75 meters of mercury  · 10/3/2018 repeat echo cardiogram suggested pulmonary systolic pressure of 60 mmHg  · Repeat echocardiogram today is pending    Alcohol use  Assessment & Plan  · Reports drinking 2 beers a day    ----------------------------------------------------------------------------------------  HPI/24hr events: This is a 68-year-old male who presented to the emergency department with loss of consciousness  Per the wife as reported to the internal medicine group the patient was watching movies earlier in the day and he turned and told her he was not feeling well and immediately lost consciousness  EMS arrived and found the patient to be unresponsive, the cardiac monitor showed V-fib, and the patient was shocked x1 and underwent CPR  Upon arrival to the emergency department the patient was breathing spontaneously, awake, and lethargic  The patient had a similar episode approximately 1 year ago with concern for possible seizure-like activity  Initial EKG in the emergency department revealed A-fib with RVR  The patient denied any chest pain at that time  The patient was admitted to the internal medicine service as a stepdown level 2 with close observation and a cardiology consult    This morning while the patient was under the care of the internal medicine service on the medical surgical stepdown 2 unit and while having an echocardiogram the patient developed a sudden onset of V  tach which quickly deteriorated to V-fib/torsades  The patient did not lose consciousness  The patient was diaphoretic with associated shortness of breath without chest pain  In the progress of obtaining ACLS equipment and 1 g of magnesium, the patient spontaneously converted to a sinus rhythm that quickly deteriorated to bigeminy, then a wide-complex bradycardia and then converted back to a sinus rhythm without medication being delivered  Cardiology arrived at bedside and 1 g of magnesium was administered  Lidocaine drip was started  The patient is now moved to the critical care service for ongoing critical care management  Patient appropriate for transfer out of the ICU today?: No  Disposition: Admit to Critical Care   Code Status: Level 1 - Full Code  ---------------------------------------------------------------------------------------  SUBJECTIVE  The patient complains of shortness of breath and associated diaphoresis  No complaints of chest pain      Review of Systems  Review of systems was unable to be performed secondary to V-fib/V  tach/torsades  ---------------------------------------------------------------------------------------  OBJECTIVE    Vitals   Vitals:    23 0500 23 0600 23 0816 23 0915   BP:   118/66 118/66   BP Location:       Pulse: 56 60 62 57   Resp:  19     Temp:       TempSrc:       SpO2:  97%     Weight:    88 9 kg (196 lb)   Height:    5' 8" (1 727 m)     Temp (24hrs), Av 6 °F (37 °C), Min:97 8 °F (36 6 °C), Max:100 8 °F (38 2 °C)  Current: Temperature: 97 8 °F (36 6 °C)          Respiratory:  SpO2: SpO2: 97 %, SpO2 Activity: SpO2 Activity: At Rest, SpO2 Device: O2 Device: None (Room air)       Invasive/non-invasive ventilation settings   Respiratory    Lab Data (Last 4 hours)    None O2/Vent Data (Last 4 hours)    None                Physical Exam  Vitals and nursing note reviewed  Constitutional:       Appearance: He is ill-appearing  HENT:      Head: Normocephalic and atraumatic  Nose: Nose normal       Mouth/Throat:      Mouth: Mucous membranes are moist       Pharynx: No oropharyngeal exudate or posterior oropharyngeal erythema  Eyes:      Extraocular Movements: Extraocular movements intact  Pupils: Pupils are equal, round, and reactive to light  Cardiovascular:      Pulses: Normal pulses  Heart sounds: Normal heart sounds  Comments: V-fib/V  tach/torsades that has converted to sinus rhythm  Upon spontaneous conversion to sinus rhythm patient had no noted murmur/rub/gallop/click  Pulmonary:      Breath sounds: Normal breath sounds  Abdominal:      General: Bowel sounds are normal       Comments: Obese, nontender, protuberant   Genitourinary:     Comments: Deferred    Musculoskeletal:         General: No swelling or signs of injury  Cervical back: Normal range of motion and neck supple  Skin:     General: Skin is warm and dry  Coloration: Skin is pale  Findings: No bruising  Neurological:      General: No focal deficit present  Mental Status: He is alert and oriented to person, place, and time     Psychiatric:         Behavior: Behavior normal          Judgment: Judgment normal          Laboratory and Diagnostics:  Results from last 7 days   Lab Units 03/07/23  0605 03/06/23  1357   WBC Thousand/uL 26 79* 49 68*   HEMOGLOBIN g/dL 10 8* 13 2   HEMATOCRIT % 31 2* 38 6   PLATELETS Thousands/uL 163 228   BANDS PCT %  --  8   MONO PCT %  --  0*     Results from last 7 days   Lab Units 03/07/23  0605 03/06/23  1357   SODIUM mmol/L 130* 133*   POTASSIUM mmol/L 4 0 4 1   CHLORIDE mmol/L 95* 98   CO2 mmol/L 24 20*   ANION GAP mmol/L 11 15*   BUN mg/dL 14 10   CREATININE mg/dL 0 91 0 96   CALCIUM mg/dL 8 4 9 4   GLUCOSE RANDOM mg/dL 135 234* ALT U/L 30 39   AST U/L 31 49*   ALK PHOS U/L 66 88   ALBUMIN g/dL 4 4 4 9   TOTAL BILIRUBIN mg/dL 0 92 1 01*     Results from last 7 days   Lab Units 03/07/23  0605 03/06/23  1357   MAGNESIUM mg/dL 2 2 1 9   PHOSPHORUS mg/dL  --  5 1*      Results from last 7 days   Lab Units 03/06/23  1357   INR  1 25*   PTT seconds 26          Results from last 7 days   Lab Units 03/06/23  1541 03/06/23  1357   LACTIC ACID mmol/L 3 5* 5 4*     ABG:    VBG:    Results from last 7 days   Lab Units 03/06/23  1357   PROCALCITONIN ng/ml 0 05       Micro  Results from last 7 days   Lab Units 03/06/23  1400 03/06/23  1357   BLOOD CULTURE  Received in Microbiology Lab  Culture in Progress  Received in Microbiology Lab  Culture in Progress  EKG: 3/7/2023 EKG at 1225 reveals a QT interval of 746 with a QTc of 714  Imaging:   3/6/2023 CT scan of the head reveals no acute intracranial abnormality  3/6/2023 chest x-ray reveals no acute cardiopulmonary findings    Intake and Output  I/O       03/05 0701  03/06 0700 03/06 0701  03/07 0700 03/07 0701  03/08 0700    P  O    120    I V  (mL/kg)  2000 (22 5)     IV Piggyback  50     Total Intake(mL/kg)  2050 (23 1) 120 (1 3)    Urine (mL/kg/hr)  1540     Stool  0     Total Output  1540     Net  +510 +120           Unmeasured Stool Occurrence  1 x 1 x          Height and Weights   Height: 5' 8" (172 7 cm)  IBW (Ideal Body Weight): 68 4 kg  Body mass index is 29 8 kg/m²  Weight (last 2 days)     Date/Time Weight    03/07/23 0915 88 9 (196)    03/06/23 2000 88 9 (196)     Weight: unable to calculateon bed scale at 03/06/23 2000 03/06/23 1624 88 9 (196)    03/06/23 1345 95 7 (210 98)            Nutrition       Diet Orders   (From admission, onward)             Start     Ordered    03/08/23 0000  Diet NPO; Sips with meds  Diet effective midnight        References:    Nutrtion Support Algorithm Enteral vs  Parenteral   Question Answer Comment   Diet Type NPO    NPO Except:  Sips with meds 03/07/23 1200    03/07/23 1145  Diet NPO  Diet effective now        References:    Nutrtion Support Algorithm Enteral vs  Parenteral   Question Answer Comment   Diet Type NPO    RD to adjust diet per protocol? Yes        03/07/23 1144                  Active Medications  Scheduled Meds:  Current Facility-Administered Medications   Medication Dose Route Frequency Provider Last Rate   • acetaminophen  650 mg Oral Q6H PRN Maryam Patterson MD     • aspirin  81 mg Oral Daily Maryam Beverage, MD     • atorvastatin  20 mg Oral Daily With Gayle Paul MD     • cefTRIAXone  1,000 mg Intravenous Q24H Maryam Beverage, MD     • enoxaparin  40 mg Subcutaneous Daily Maryam Beverage, MD     • folic acid  1 mg Oral Daily Maryam Beverage, MD     • lidocaine in dextrose 5%  1 mg/min Intravenous Continuous Fredderick Alessandra, DO 1 mg/min (03/07/23 1124)   • LORazepam  1 mg Intravenous Q4H PRN Maryam Patterson MD     • melatonin  6 mg Oral HS Joel Puri PA-C     • metoprolol tartrate  25 mg Oral Q12H [address] Cyril Montes MD     • montelukast  10 mg Oral HS Maraym Patterson MD     • multivitamin-minerals  1 tablet Oral Daily Maryam Beverage, MD     • pantoprazole  40 mg Oral Early Morning Maryam Beverage, MD     • thiamine  100 mg Oral Daily Maryam Beverage, MD       Continuous Infusions:  lidocaine in dextrose 5%, 1 mg/min, Last Rate: 1 mg/min (03/07/23 1124)      PRN Meds:   acetaminophen, 650 mg, Q6H PRN  LORazepam, 1 mg, Q4H PRN        Invasive Devices Review  Invasive Devices     Peripheral Intravenous Line  Duration           Peripheral IV 03/06/23 Dorsal (posterior); Left Hand 1 day    Peripheral IV 03/06/23 Right Antecubital 1 day          Drain  Duration           Urethral Catheter Temperature probe 16 Fr  <1 day                MATHEW Hernandez      Portions of the record may have been created with voice recognition software    Occasional wrong word or "sound a like" substitutions may have occurred due to the inherent limitations of voice recognition software    Read the chart carefully and recognize, using context, where substitutions have occurred

## 2023-03-07 NOTE — PROGRESS NOTES
Progress Note - Cardiology   Austen Arroyo 81 y o  male MRN: [id number]  Unit/Bed#: ICU 13 Encounter: [id number]      Assessment/Recommendations/Discussion:   • V-fib arrest -torsades/VT/VF  • Paroxysmal atrial fibrillation  • Hypertension  • Long QT              PLAN  • During his echo today he went into torsades  • QT on most recent ECG was 671 ms  • We will avoid amiodarone  • Start lidocaine drip  • Fortunately he came out of the arrhythmia prior to defibrillation shock  • Echo does show apex hypokinesis which is new since prior echo  • Will need cath to eval for obstructive CAD/ischemia  • Given prolonged QT and now primary arrhythmia, will also need ICD  • Confirmed the plan with interventional cardiology Dr Curly Paz as well as electrophysiology Dr Fly So  • Plan for cardiac cath here with then transfer to [facility] for ICD    Subjective:   HPI  He states he is scared  He went into arrhythmia in the middle of echo and does not remember the event, felt like he fell asleep    Review of Systems: As noted in HPI  Rest of ROS is negative  Vitals:   /66   Pulse 62   Temp 97 8 °F (36 6 °C) (Oral)   Resp 19   Ht 5' 8" (1 727 m)   Wt 88 9 kg (196 lb) Comment: unable to calculateon bed scale  SpO2 97%   BMI 29 80 kg/m²   I/O       03/05 0701  03/06 0700 03/06 0701  03/07 0700 03/07 0701  03/08 0700    P  O    120    I V  (mL/kg)  2000 (22 5)     IV Piggyback  50     Total Intake(mL/kg)  2050 (23 1) 120 (1 3)    Urine (mL/kg/hr)  1540     Stool  0     Total Output  1540     Net  +510 +120           Unmeasured Stool Occurrence  1 x         Weight (last 2 days)     Date/Time Weight    03/06/23 2000 88 9 (196)     Weight: unable to calculateon bed scale at 03/06/23 2000 03/06/23 1624 88 9 (196)    03/06/23 1345 95 7 (210 98)          Physical Exam   Constitutional: awake, alert and oriented, in no acute distress, no obvious deformities, obese male  Head: Normocephalic, without obvious abnormality, atraumatic  Eyes: conjunctivae clear and moist  Sclera anicteric  No xanthelasmas  Pupils equal bilaterally  Extraocular motions are full  Ear nose mouth and throat: ears are symmetrical bilaterally, hearing appears to be equal bilaterally, no nasal discharge or epistaxis, oropharynx is clear with moist mucous membranes  Neck: Trachea is midline, neck is supple, no thyromegaly or significant lymphadenopathy, there is full range of motion  Lungs: clear to auscultation bilaterally, no wheezes, no rales, no rhonchi, no accessory muscle use, breathing is nonlabored  Heart: regular rate and rhythm, S1, S2 normal, no murmur, no click, no rub and no gallop, no lower extremity edema  Abdomen: Obese, soft, non-tender; bowel sounds normal; no masses, no organomegaly  Psychiatric: Patient is oriented to time, place, person, mood/affect is negative for depression, anxiety, agitation, appears to have appropriate insight  Skin: Skin is warm, dry, intact  No obvious rashes or lesions on exposed extremities  Nail beds are pink with no cyanosis or clubbing      TELEMETRY: Torsades, VT, VF earlier    Lab Results:  Results from last 7 days   Lab Units 03/07/23  0605   WBC Thousand/uL 26 79*   HEMOGLOBIN g/dL 10 8*   HEMATOCRIT % 31 2*   PLATELETS Thousands/uL 163     Results from last 7 days   Lab Units 03/07/23  0605   POTASSIUM mmol/L 4 0   CHLORIDE mmol/L 95*   CO2 mmol/L 24   BUN mg/dL 14   CREATININE mg/dL 0 91   CALCIUM mg/dL 8 4   ALK PHOS U/L 66   ALT U/L 30   AST U/L 31     Results from last 7 days   Lab Units 03/07/23  0605   POTASSIUM mmol/L 4 0   CHLORIDE mmol/L 95*   CO2 mmol/L 24   BUN mg/dL 14   CREATININE mg/dL 0 91   CALCIUM mg/dL 8 4           Medications:    Current Facility-Administered Medications:   •  acetaminophen (TYLENOL) tablet 650 mg, 650 mg, Oral, Q6H PRN, Earl Sanderson MD  •  aspirin (ECOTRIN LOW STRENGTH) EC tablet 81 mg, 81 mg, Oral, Daily, Earl Sanderson MD, 81 mg at 03/07/23 0820  • atorvastatin (LIPITOR) tablet 20 mg, 20 mg, Oral, Daily With Mariam Newton MD, 20 mg at 03/06/23 1743  •  cefTRIAXone (ROCEPHIN) IVPB (premix in dextrose) 1,000 mg 50 mL, 1,000 mg, Intravenous, Q24H, Maryam Patterson MD  •  enoxaparin (LOVENOX) subcutaneous injection 40 mg, 40 mg, Subcutaneous, Daily, Maryam Patterson MD, 40 mg at 58/75/80 5034  •  folic acid (FOLVITE) tablet 1 mg, 1 mg, Oral, Daily, Maryam Patterson MD, 1 mg at 03/07/23 9449  •  LORazepam (ATIVAN) injection 1 mg, 1 mg, Intravenous, Q4H PRN, Maryam Patterson MD, 1 mg at 03/06/23 2246  •  melatonin tablet 6 mg, 6 mg, Oral, HS, Joel Puri PA-C, 6 mg at 03/06/23 2342  •  metoprolol tartrate (LOPRESSOR) tablet 25 mg, 25 mg, Oral, Q12H [address], Maryam Patterson MD, 25 mg at 03/07/23 0816  •  montelukast (SINGULAIR) tablet 10 mg, 10 mg, Oral, HS, Maryam Patterson MD, 10 mg at 03/06/23 2112  •  multivitamin-minerals (CENTRUM) tablet 1 tablet, 1 tablet, Oral, Daily, Maryam Patterson MD, 1 tablet at 03/07/23 6688  •  pantoprazole (PROTONIX) EC tablet 40 mg, 40 mg, Oral, Early Morning, Maryam Patterson MD, 40 mg at 03/07/23 0286  •  thiamine tablet 100 mg, 100 mg, Oral, Daily, Maryam Patterson MD, 100 mg at 03/07/23 7558    Portions of the record may have been created with voice recognition software  Occasional wrong word or "sound a like" substitutions may have occurred due to the inherent limitations of voice recognition software  Read the chart carefully and recognize, using context, where substitutions have occurred      Cresencio Lopez DO, [facility]  3/7/2023 10:28 AM

## 2023-03-07 NOTE — ASSESSMENT & PLAN NOTE
· January 2021 echocardiogram suggested pulmonary pressure 75 meters of mercury  · 10/3/2018 repeat echo cardiogram suggested pulmonary systolic pressure of 60 mmHg  · Repeat echocardiogram 3/7/2023 reveals EF of 40%  The echocardiogram was limited by the patient having a torsades event during the performance of the echocardiogram   Found to have hypokinetic basal, inferior septal, mid anterior septal, mid inferior septal, mid inferior, apical anterior, apical septal, and apical lateral segments

## 2023-03-07 NOTE — ASSESSMENT & PLAN NOTE
· Secondary to above V-fib/V  tach/torsades event  · Cardiology on consult  · Cath completed with findings outlined above    · Temporary pacing wire placed in right femoral approach  · Patient transferred to [place] for ICD placement

## 2023-03-07 NOTE — TRANSPORTATION MEDICAL NECESSITY
Section I - General Information    Name of Patient: Bishop Bernal                 : 1950    Medicare #: [id number]  Transport Date:                    (PCS is valid for round trips on this date and for all repetitive trips in the 60-day range as noted below )  Origin: [address] [place] [address] Avenue: [facility]  Is the pt's stay covered under Medicare Part A (PPS/DRG)   [x]     Closest appropriate facility? If no, why is transport to more distant facility required? Yes  If hospice pt, is this transport related to pt's terminal illness? NA       Section II - Medical Necessity Questionnaire  Ambulance transportation is medically necessary only if other means of transport are contraindicated or would be potentially harmful to the patient  To meet this requirement, the patient must either be "bed confined" or suffer from a condition such that transport by means other than ambulance is contraindicated by the patient's condition  The following questions must be answered by the medical professional signing below for this form to be valid:    1)  Describe the MEDICAL CONDITION (physical and/or mental) of this patient AT [address] [place] [address] that requires the patient to be transported in an ambulance and why transport by other means is contraindicated by the patient's condition: Patient requires higher level of care not available at this facility - ICD placement    2) Is the patient "bed confined" as defined below? No  To be "be confined" the patient must satisfy all three of the following conditions: (1) unable to get up from bed without Assistance; AND (2) unable to ambulate; AND (3) unable to sit in a chair or wheelchair  3) Can this patient safely be transported by car or wheelchair van (i e , seated during transport without a medical attendant or monitoring)?    No    4) In addition to completing questions 1-3 above, please check any of the following conditions that apply*:   *Note: supporting documentation for any boxes checked must be maintained in the patient's medical records  If hosp-hosp transfer, describe services needed at 2nd facility not available at 1st facility? ICD placement  Patient is confused  IV meds/fluids required   Medical attendant required   Hemodynamic monitoring required en route  Cardiac monitoring required en route   Other(specify) External pacemaker on right groin - leg must remain straight      Section III - Signature of Physician or Healthcare Professional  I certify that the above information is true and correct based on my evaluation of this patient, and represent that the patient requires transport by ambulance and that other forms of transport are contraindicated  I understand that this information will be used by the Centers for Medicare and Medicaid Services (CMS) to support the determination of medical necessity for ambulance services, and I represent that I have personal knowledge of the patient's condition at time of transport  [x]  If this box is checked, I also certify that the patient is physically or mentally incapable of signing the ambulance service's claim and that the institution with which I am affiliated has furnished care, services, or assistance to the patient  My signature below is made on behalf of the patient pursuant to 42 CFR §424 36(b)(4)  In accordance with 42 CFR §424 37, the specific reason(s) that the patient is physically or mentally incapable of signing the claim form is as follows: patient is confused        Signature of Physician* or Healthcare Professional______________________________________________________________  Signature Date 03/07/23 (For scheduled repetitive transports, this form is not valid for transports performed more than 60 days after this date)    Printed Name & Credentials of Physician or Healthcare Professional (MD, DO, RN, etc )________________________________  *Form must be signed by patient's attending physician for scheduled, repetitive transports   For non-repetitive, unscheduled ambulance transports, if unable to obtain the signature of the attending physician, any of the following may sign (choose appropriate option below)  [] Physician Assistant []  Clinical Nurse Specialist []  Registered Nurse  []  Nurse Practitioner  [x] Discharge Planner

## 2023-03-07 NOTE — ASSESSMENT & PLAN NOTE
· Patient is on Norvasc 5 mg daily and Lopressor 25 mg twice daily at home  · Continue metoprolol per cardiology's recommendations

## 2023-03-07 NOTE — ASSESSMENT & PLAN NOTE
· Secondary to above V-fib/V  tach/torsades event  · Cardiology on consult  · Plan for cardiac cath today and ICD placement

## 2023-03-07 NOTE — ASSESSMENT & PLAN NOTE
· Patient with known CLL, follows with oncology as an outpatient  · Leukocytosis increased above baseline, baseline appears to be 14-18,000  · This is likely secondary to underlying infection-urinary source

## 2023-03-07 NOTE — ASSESSMENT & PLAN NOTE
53-year-old male presented with loss of consciousness, possible V-fib cardiac arrest status post shock, questionable seizure-like activity does meet sepsis criteria  Elevated leukocytosis, fevers with possible urinary source given history of straight cathing for neurogenic bladder  Continue Rocephin, status post sepsis fluids  Await blood cultures, UA and urine cultures

## 2023-03-07 NOTE — NURSING NOTE
Notified by echo tech, patient "passed out for a sec " Multiple PVCDs noted on unit  VFib on the monitor, patient appeared diaphoretic, eyes closing  Called out for medical team  Critical care team and Dr Beal at bed side

## 2023-03-07 NOTE — ASSESSMENT & PLAN NOTE
· Recent ECG QT was 671 ms  · Avoid amiodarone  · Monitor cardiac status closely  · Cardiology on consult

## 2023-03-07 NOTE — H&P
H&P Exam - [facility] [address] 81 y o  male MRN: [id number]  Unit/Bed#: MICU 05 Encounter: [id number]      -------------------------------------------------------------------------------------------------------------  Chief Complaint: Loss of consciousness     History of Present Illness     Danae Rodriguez is a 81 y o  male who presents with PMH of paroxysmal afib, HTN, long QT, alcohol use, marijuana use and CLL  Pt initially presented to [facility] ED 3/6 for loss of consciousness  Per the pt's wife, they were watching a movie when the pt told his wife that he was not feeling well and immediately lost consciousness  EMS arrived and found the patient to be unresponsive  The pt was found to be in V-fib and patient was shocked x1 and underwent CPR  ROSC achieved in the field  Upon arrival to the ED, the pt was found to be breathing spontaneously, alert, awake, but lethargic  Pt had similar episodes last year with concern of possible seizure activity  EKG showed A-fib with RVR  Pt denies chest pain, fevers, chills, nausea, or vomiting at this time  Cardiology was consulted and reviewed the ECG which revealed a regular narrow complex tachycardia suggesting SVT  Pt was then given adenosine  Pt was found to have elevated troponin, a low grade fever and elevated WBC showing evidence of sepsis  Urinalysis showed WBC and innumerable bacteria  Pt was started on ceftriaxone  Neurology was consulted for evaluation of his loss of consciousness and planned for CTA head/neck to exclude basilar/other critical vessel abnormalities that could lead to LOC/hypoperfusion and syncope  Otherwise neurology workup was unremarkable  Today the pt was receiving an echo when the pt developed v fib arrest and went into torsades with QT of 671 ms  Started on lidocaine drip  Pt came out of arrhythmia prior to defibrillation  The echo showed apex hypokinesis which is new prior to echo   Pt received cardiac cath in [facility], had temporary pacer placed for recurrent torsades and has been transferred to [facility] MICU with plans for ICD placement  History obtained from chart review and the patient   -------------------------------------------------------------------------------------------------------------  Assessment and Plan:    Neuro:   • Diagnosis: Syncope  o Plan:   o Likely due to arrhythmia torsades/ VT/ VFib   o Plan for ICD with EP   o Currently on lidocaine gtt and overdrive paced  o Avoid QT prolonging medications  • Diagnosis: Hx of Dementia  o Plan:   o Avoid QT prolonging meds  o Precedex to keep calm while temporary pacer in        CV:   • Diagnosis: Torsades with QT on ECG of 671 ms  o Plan:  o Continue lidocaine drip, avoid amiodarone   o Plan for ICD with EP, NPO at midnight  o Avoid QT prolonging medications  o EP/ cardiology consulted, appreciate recs  • Diagnosis: S/p cardiac cath   o Plan:    No flow-significant atherosclerotic lesions were identified  Temporary pacing wire placed for recurrent torsade, including an  episode immediately prior to the catheterization procedure  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    • Diagnosis: HTN  o Plan:  o Continue home norvasc  • Diagnosis: pAfib  o Plan:   o Continue metoprolol per cards recs   o Not currently anticoagulated        Pulm:  • Diagnosis: No acute issues  o Plan:   o Satting appropriately on RA  o Titrate O2 to maintain SpO2 > 92%      GI:   • Diagnosis: GERD  o Plan:   o Continue home protonix   • Diagnosis: Diet  o Plan:   o NPO at midnight for ICD placement      :   • Diagnosis: UTI  o Plan:   o Presented with SIRS criteria   o Urine culture: > 100,000 GNR  o Started on ceftriaxone    F/E/N:   • Plan:   • Fluids: n/a  • Electrolytes: Replete PRN  • Nutrition: NPO at midnight      Heme/Onc:   • Diagnosis: Hx of CLL  o Plan:   o Followed outpatient   o Baseline WBC 14,000-18,000   o Currently elevated, likely due to infection  • Diagnosis:   o Plan: o Trend blood counts  o Transfuse for Hgb >7      Endo:   • Diagnosis: HLD  o Plan:   o Continue home atorvastatin      ID:   • Diagnosis: Sepsis   o Plan:   o Leukocytosis, fevers, with possible urinary source given history of straight cathing for neurogenic bladder  o Continue Ceftriaxone   o Pending blood culture   • Diagnosis:   o Plan:   o Trend fever curve/ WBC      MSK/Skin:   • Diagnosis:   o Plan:   o Repositioning  o PT/OT when appropriate      Disposition: Admit to Critical Care   Code Status: Level 1 - Full Code  --------------------------------------------------------------------------------------------------------------  Review of Systems   Constitutional: Negative for chills and fever  HENT: Negative for sore throat  Respiratory: Negative for cough and shortness of breath  Cardiovascular: Negative for chest pain, palpitations and leg swelling  Gastrointestinal: Negative for abdominal pain, diarrhea, nausea and vomiting  Genitourinary: Negative for dysuria and frequency  Musculoskeletal: Negative for myalgias  Skin: Negative for rash and wound  Neurological: Negative for dizziness, weakness, light-headedness, numbness and headaches  Psychiatric/Behavioral: Positive for confusion  The patient is nervous/anxious  All other systems reviewed and are negative  A 12-point, complete review of systems was reviewed and negative except as stated above     Physical Exam  Vitals and nursing note reviewed  Constitutional:       General: He is not in acute distress  Appearance: Normal appearance  He is not ill-appearing or toxic-appearing  HENT:      Head: Normocephalic and atraumatic  Right Ear: External ear normal       Left Ear: External ear normal       Nose: Nose normal       Mouth/Throat:      Mouth: Mucous membranes are moist       Pharynx: Oropharynx is clear  Eyes:      General: No scleral icterus  Extraocular Movements: Extraocular movements intact  Cardiovascular:      Rate and Rhythm: Normal rate and regular rhythm  Pulses: Normal pulses  Pulmonary:      Effort: Pulmonary effort is normal  No respiratory distress  Breath sounds: Normal breath sounds  Abdominal:      Palpations: Abdomen is soft  Tenderness: There is no abdominal tenderness  Musculoskeletal:         General: Normal range of motion  Cervical back: Normal range of motion and neck supple  Comments: RLE in immobilizer  Pacer in R groin   Skin:     General: Skin is warm  Capillary Refill: Capillary refill takes less than 2 seconds  Neurological:      General: No focal deficit present  Mental Status: He is alert and oriented to person, place, and time  Comments: Pleasantly confused  --------------------------------------------------------------------------------------------------------------  Vitals:   Vitals:    03/07/23 1811 03/07/23 1819   BP: 126/89    Pulse: 88 88   Resp: 17 20   Temp:  98 6 °F (37 °C)   SpO2: 96% 96%   Weight: 86 2 kg (190 lb 0 6 oz)    Height: 5' 8" (1 727 m)      Temp  Min: 97 8 °F (36 6 °C)  Max: 100 8 °F (38 2 °C)  IBW (Ideal Body Weight): 68 4 kg  Height: 5' 8" (172 7 cm)  Body mass index is 28 89 kg/m²    N/A    Laboratory and Diagnostics:  Results from last 7 days   Lab Units 03/07/23  0605 03/06/23  1357   WBC Thousand/uL 26 79* 49 68*   HEMOGLOBIN g/dL 10 8* 13 2   HEMATOCRIT % 31 2* 38 6   PLATELETS Thousands/uL 163 228   BANDS PCT %  --  8   MONO PCT %  --  0*     Results from last 7 days   Lab Units 03/07/23  0605 03/06/23  1357   SODIUM mmol/L 130* 133*   POTASSIUM mmol/L 4 0 4 1   CHLORIDE mmol/L 95* 98   CO2 mmol/L 24 20*   ANION GAP mmol/L 11 15*   BUN mg/dL 14 10   CREATININE mg/dL 0 91 0 96   CALCIUM mg/dL 8 4 9 4   GLUCOSE RANDOM mg/dL 135 234*   ALT U/L 30 39   AST U/L 31 49*   ALK PHOS U/L 66 88   ALBUMIN g/dL 4 4 4 9   TOTAL BILIRUBIN mg/dL 0 92 1 01*     Results from last 7 days   Lab Units 03/07/23  0605 03/06/23  1357   MAGNESIUM mg/dL 2 2 1 9   PHOSPHORUS mg/dL  --  5 1*      Results from last 7 days   Lab Units 03/06/23  1357   INR  1 25*   PTT seconds 26          Results from last 7 days   Lab Units 03/06/23  1541 03/06/23  1357   LACTIC ACID mmol/L 3 5* 5 4*     ABG:    VBG:    Results from last 7 days   Lab Units 03/06/23  1357   PROCALCITONIN ng/ml 0 05       Micro:  Results from last 7 days   Lab Units 03/06/23  1424 03/06/23  1400 03/06/23  1357   BLOOD CULTURE   --  Received in Microbiology Lab  Culture in Progress  Received in Microbiology Lab  Culture in Progress  URINE CULTURE  >100,000 cfu/ml Gram Negative Simba Enteric Like*  --   --        EKG:   Sinus bradycardia with 1st degree A-V block  Cannot rule out Inferior infarct , age undetermined  ST & T wave abnormality, consider anterolateral ischemia  Severely  Prolonged QT  Abnormal ECG  Imaging: I have personally reviewed pertinent reports     and I have personally reviewed pertinent films in PACS    Historical Information   Past Medical History:   Diagnosis Date   • Anxiety    • BPH (benign prostatic hypertrophy)    • [facility]     CLL   • CLL (chronic lymphocytic leukemia) ([facility] )     2009   • Colon polyp    • Concussion     Resolved: 08/22/16   • Depression    • Diverticulitis 1/13/2020 2014 CT done 11/19 12/19 CT done    • E coli bacteremia 6/27/2021    2/2 blood cultures with Cam Brenda appears to be the urine    • Epididymitis 5/19/2021 5/2021   • Gastrointestinal hemorrhage     Last assessed: 08/27/13   • GERD (gastroesophageal reflux disease)    • H/O vitamin D deficiency 7/7/2021   • Hearing loss of aging    • Hiatal hernia    • History of right hip replacement 4/19/2021   • History of transfusion    • Hyperlipidemia    • Hypertension    • Hyponatremia 3/6/2023   • Iron deficiency anemia    • Microscopic hematuria     Last assessed: 06/28/13   • OA (osteoarthritis)     right hip   • Pneumothorax    • Primary osteoarthritis of right hip 2017    He is status post right hip arthroplasty   • Prostatitis    • Pulmonary hypertension (HCC)    • Seasonal allergies    • Urinary tract infection associated with catheterization of urinary tract ([facility] ) 2021    Pseudomonal UTI asymptomatic  Per Urology do not treat at this time  • Urinary tract infection associated with catheterization of urinary tract ([facility] ) 2021    Pseudomonal UTI asymptomatic  Per Urology do not treat at this time    He did have urosepsis from E coli    • UTI (urinary tract infection)     in past   • Wears glasses      Past Surgical History:   Procedure Laterality Date   • BLADDER SURGERY     • COLONOSCOPY     • CYSTOSCOPY  10/09/2014    Diagnostic   • CYSTOSCOPY  2020   • FL CYSTOGRAM  08/15/2022   • FRACTURE SURGERY      left lower arm   • FRACTURE SURGERY      left femur   • HERNIA REPAIR     • JOINT REPLACEMENT Right     hip   • OTHER SURGICAL HISTORY  2011    Spinal anesthesia epidural   • PA ARTHRP ACETBLR/PROX FEM PROSTC AGRFT/ALGRFT Right 2017    Procedure: ARTHROPLASTY HIP TOTAL ANTERIOR;  Surgeon: Katie Mathews MD;  Location: [facility];  Service: Orthopedics   • TONSILLECTOMY AND ADENOIDECTOMY     • TRANSURETHRAL RESECTION OF PROSTATE      x 2   • WRIST SURGERY       Social History   Social History     Substance and Sexual Activity   Alcohol Use Yes   • Alcohol/week: 10 0 standard drinks   • Types: 10 Cans of beer per week     Social History     Substance and Sexual Activity   Drug Use Yes   • Types: Marijuana    Comment: "medical marijuana"     Social History     Tobacco Use   Smoking Status Former   • Packs/day: 1 00   • Years: 15 00   • Pack years: 15 00   • Types: Cigarettes   • Start date: 67   • Quit date: 1980   • Years since quittin 5   Smokeless Tobacco Never     Exercise History: n/a  Family History:   Family History   Problem Relation Age of Onset   • No Known Problems Mother    • Heart attack Father    • Diabetes Brother    • Prostate cancer Brother      I have reviewed this patient's family history and commented on sigificant items within the HPI      Medications:  Current Facility-Administered Medications   Medication Dose Route Frequency   • acetaminophen (TYLENOL) tablet 650 mg  650 mg Oral Q6H PRN   • [START ON 3/8/2023] aspirin chewable tablet 81 mg  81 mg Oral Daily   • [START ON 3/8/2023] atorvastatin (LIPITOR) tablet 20 mg  20 mg Oral Daily With Dinner   • [START ON 3/8/2023] cefTRIAXone (ROCEPHIN) 1,000 mg in dextrose 5 % 50 mL IVPB  1,000 mg Intravenous Q24H   • dexmedeTOMIDine (Precedex) 400 mcg in sodium chloride 0 9% 100 mL  0 1-0 7 mcg/kg/hr Intravenous Titrated   • [START ON 3/8/2023] enoxaparin (LOVENOX) subcutaneous injection 40 mg  40 mg Subcutaneous Daily   • [START ON 8/1/2850] folic acid (FOLVITE) tablet 1 mg  1 mg Oral Daily   • lidocaine 2000 mg in 250 mL infusion (cardiac premix)- NOT FOR TREATMENT OF PAIN  1 mg/min Intravenous Continuous   • melatonin tablet 6 mg  6 mg Oral HS   • metoprolol tartrate (LOPRESSOR) tablet 25 mg  25 mg Oral Q12H [facility]   • montelukast (SINGULAIR) tablet 10 mg  10 mg Oral HS   • [START ON 3/8/2023] multivitamin-minerals (CENTRUM) tablet 1 tablet  1 tablet Oral Daily   • [START ON 3/8/2023] pantoprazole (PROTONIX) EC tablet 40 mg  40 mg Oral Early Morning   • sodium chloride 0 9 % infusion  100 mL/hr Intravenous Continuous   • [START ON 3/8/2023] thiamine tablet 100 mg  100 mg Oral Daily     Home medications:  Prior to Admission Medications   Prescriptions Last Dose Informant Patient Reported? Taking?    Acetaminophen 500 MG   Yes No   Sig: Take 1,000 mg by mouth prn   Blood Pressure Monitoring (Omron 5 Series BP Monitor) PEE   Yes No   LORazepam (Ativan) 0 5 mg tablet   No No   Sig: Take 1 tablet (0 5 mg total) by mouth every 8 (eight) hours as needed for anxiety   Omega-3 Fatty Acids (FISH OIL) 1,000 mg   Yes No   Sig: Take by mouth daily   amLODIPine (NORVASC) 5 mg tablet   No No   Sig: Take 1 tablet (5 mg total) by mouth every evening   ascorbic acid (VITAMIN C) 250 MG CHEW   Yes No   Sig: Chew 250 mg 2 (two) times a day   aspirin 81 MG tablet   Yes No   Sig: Take 1 tablet by mouth daily   atorvastatin (LIPITOR) 20 mg tablet   No No   Sig: TAKE ONE TABLET BY MOUTH ONCE EVERY DAY   cetirizine (ZyrTEC) 10 mg tablet   Yes No   Sig: Take 10 mg by mouth as needed     metoprolol tartrate (LOPRESSOR) 25 mg tablet   No No   Sig: Take 1 tablet (25 mg total) by mouth every 12 (twelve) hours   montelukast (SINGULAIR) 10 mg tablet   No No   Sig: TAKE ONE TABLET BY MOUTH DAILY   omeprazole (PriLOSEC) 40 MG capsule   No No   Sig: Take one capsule by mouth once every day  sertraline (ZOLOFT) 50 mg tablet   No No   Sig: TAKE ONE TABLET BY MOUTH EVERY DAY   vitamin B-12 (CYANOCOBALAMIN) 500 MCG TABS   No No   Sig: Take 1 tablet (500 mcg total) by mouth daily      Facility-Administered Medications: None     Allergies: Allergies   Allergen Reactions   • Codeine Other (See Comments)     agitated   • Sulfamethoxazole-Trimethoprim GI Intolerance and Abdominal Pain     upset stomach     ------------------------------------------------------------------------------------------------------------  Advance Directive and Living Will: Yes    Power of :    POLST:    ------------------------------------------------------------------------------------------------------------  Anticipated Length of Stay is > 2 midnights    Care Time Delivered:         Billy Lobo DO        Portions of the record may have been created with voice recognition software  Occasional wrong word or "sound a like" substitutions may have occurred due to the inherent limitations of voice recognition software    Read the chart carefully and recognize, using context, where substitutions have occurred

## 2023-03-07 NOTE — ED NOTES
Attempted to call report at this time with no answer   Will reattempt at [address] [facility], RN  03/06/23 1929

## 2023-03-07 NOTE — PROGRESS NOTES
[address] [place] [address]  Progress Note Hannah Maldonado 1950, 81 y o  male MRN: [id number]  Unit/Bed#: [facility] CATH LAB ROOM Encounter: [id number]  Primary Care Provider: Bessy Perry MD   Date and time admitted to hospital: 3/6/2023  1:45 PM    * Ventricular fibrillation [facility])  Assessment & Plan  · Patient initially found with loss of consciousness, found to be in V-fib status post shock by EMS  · Repeat EKG showing A-fib with RVR  · History of prolonged QT  · During echocardiogram today patient noted to have episode of torsades  · Etiology presented at bedside and initiated a lidocaine drip  · Cardiogram concerning for apex hypokinesis plan for cardiac catheterization  · Chapin with cardiology and critical care team will upgrade level of care to critical care team    Sepsis [facility])  Assessment & Plan  74-year-old male presented with loss of consciousness, possible V-fib cardiac arrest status post shock, questionable seizure-like activity does meet sepsis criteria  · Elevated leukocytosis, fevers with possible urinary source given history of straight cathing for neurogenic bladder  · Continue Rocephin, status post sepsis fluids  · Await blood cultures, UA and urine cultures    Alcohol use  Assessment & Plan  Patient reportedly drinks at least 5 beers daily  Monitor on CIWA protocol, folate, thiamine    Seiz[facility] ([facility] )  Assessment & Plan  · Patient with questionable loss of consciousness, concern for possible seizure-like activity  · Previously had seizure-like symptoms back in September, suspected likely due to infection by neurology not on AED  · CT head negative for acute process  · Seizure cautions  · Neurology consulted for further recommendation      CLL (chronic lymphocytic leukemia) ([facility] )  Assessment & Plan  Patient with known CLL, follows with oncology outpatient  Leukocytosis increased above baseline, baseline appears to be 14-18  Suspect likely infection component    Essential hypertension  Assessment & Plan  Continue home blood pressure medication amlodipine 5 mg daily and Lopressor 25 twice daily            VTE Pharmacologic Prophylaxis:   Pharmacologic: Lovenox    Patient Centered Rounds: I have performed bedside rounds with nursing staff today  Discussions with Specialists or Other Care Team Provider: Cardiologist, Dr Avinash Yadav, critical care team    Education and Discussions with Family / Patient: Updated family at bedside    Time Spent for Care: More than 50% of total time spent on counseling and coordination of care as described above  Current Length of Stay: 1 day(s)    Current Patient Status: Inpatient   Certification Statement: The patient will continue to require additional inpatient hospital stay due to Episode of V-fib, torsades, need for cardiac cath, sepsis    Discharge Plan / Estimated Discharge Date: TBD    Code Status: Level 1 - Full Code      Subjective:   Patient seen and examined at bedside, diaphoretic, dyspneic and tachypneic    Objective:     Vitals:   Temp (24hrs), Av 4 °F (36 9 °C), Min:97 8 °F (36 6 °C), Max:99 °F (37 2 °C)    Temp:  [97 8 °F (36 6 °C)-99 °F (37 2 °C)] 97 8 °F (36 6 °C)  HR:  [] 54  Resp:  [18-51] 22  BP: ()/() 108/67  SpO2:  [93 %-98 %] 93 %  Body mass index is 29 8 kg/m²  Input and Output Summary (last 24 hours): Intake/Output Summary (Last 24 hours) at 3/7/2023 1437  Last data filed at 3/7/2023 8631  Gross per 24 hour   Intake 2170 ml   Output 1290 ml   Net 880 ml       Physical Exam:    Constitutional: Patient is oriented to person, place and time, diaphoretic and tachypneic  HEENT:  Normocephalic, atraumatic  Cardiovascular: Normal S1S2, RRR, No murmurs/rubs/gallops appreciated  Pulmonary:  Bilateral air entry, No rhonchi/rales/wheezing appreciated  Abdominal: Soft, Bowel sounds present, Non-tender, Non-distended  Extremities:  No cyanosis, clubbing or edema     Neurological: Awake, alert  Skin:  Warm, dry    Additional Data:     Labs:    Results from last 7 days   Lab Units 03/07/23  0605 03/06/23  1357   WBC Thousand/uL 26 79* 49 68*   HEMOGLOBIN g/dL 10 8* 13 2   HEMATOCRIT % 31 2* 38 6   PLATELETS Thousands/uL 163 228   LYMPHO PCT %  --  68*   MONO PCT %  --  0*   EOS PCT %  --  0     Results from last 7 days   Lab Units 03/07/23  0605   POTASSIUM mmol/L 4 0   CHLORIDE mmol/L 95*   CO2 mmol/L 24   BUN mg/dL 14   CREATININE mg/dL 0 91   CALCIUM mg/dL 8 4   ALK PHOS U/L 66   ALT U/L 30   AST U/L 31     Results from last 7 days   Lab Units 03/06/23  1357   INR  1 25*        I Have Reviewed All Lab Data Listed Above  Invasive Devices     Peripheral Intravenous Line  Duration           Peripheral IV 03/06/23 Dorsal (posterior); Left Hand 1 day    Peripheral IV 03/06/23 Right Antecubital 1 day          Line  Duration           Venous Sheath 6 Fr  Right Femoral <1 day          Drain  Duration           Urethral Catheter Temperature probe 16 Fr  1 day                 Recent Cultures (last 7 days):     Results from last 7 days   Lab Units 03/06/23  1400 03/06/23  1357   BLOOD CULTURE  Received in Microbiology Lab  Culture in Progress  Received in Microbiology Lab  Culture in Progress         Last 24 Hours Medication List:   Current Facility-Administered Medications   Medication Dose Route Frequency Provider Last Rate   • acetaminophen  650 mg Oral Q6H PRN Verona Johnson MD     • aspirin  81 mg Oral Daily Verona Johnson MD     • atorvastatin  20 mg Oral Daily With Youlanda Boas, MD     • cefTRIAXone  1,000 mg Intravenous Q24H Verona Johnson MD     • enoxaparin  40 mg Subcutaneous Daily Verona Johnson MD     • folic acid  1 mg Oral Daily Verona Johnson MD     • heparin (porcine)   Intravenous Code/Trauma/Sedation Hitesh Truong MD     • lidocaine (PF)   Infiltration Code/Trauma/Sedation Hitesh Truong MD     • lidocaine in dextrose 5%  1 mg/min Intravenous Continuous Margarita Crow Ramesh DO 1 mg/min (03/07/23 1124)   • LORazepam  1 mg Intravenous Q4H PRN Jayson Hill MD     • melatonin  6 mg Oral HS Veena Lemus PA-C     • metoprolol tartrate  25 mg Oral Q12H [facility] Cyril Ahmadi MD     • montelukast  10 mg Oral HS Jayson Hill MD     • multivitamin-minerals  1 tablet Oral Daily Jayson Hill MD     • nitroGLYcerin   Intra-arterial Code/Trauma/Sedation Hitesh Shepherd MD     • pantoprazole  40 mg Oral Early Morning Jayson Hill MD     • thiamine  100 mg Oral Daily Jayson Hill MD     • verapamil   Intra-arterial Code/Trauma/Sedation Hitesh Shepherd MD          Today, Patient Was Seen By: Deneen Ramos MD

## 2023-03-08 ENCOUNTER — ANESTHESIA (INPATIENT)
Dept: NON INVASIVE DIAGNOSTICS | Facility: HOSPITAL | Age: 73
End: 2023-03-08

## 2023-03-08 ENCOUNTER — ANESTHESIA EVENT (INPATIENT)
Dept: NON INVASIVE DIAGNOSTICS | Facility: HOSPITAL | Age: 73
End: 2023-03-08

## 2023-03-08 ENCOUNTER — APPOINTMENT (OUTPATIENT)
Dept: RADIOLOGY | Facility: HOSPITAL | Age: 73
End: 2023-03-08

## 2023-03-08 LAB
2HR DELTA HS TROPONIN: -41 NG/L
ANION GAP SERPL CALCULATED.3IONS-SCNC: 5 MMOL/L (ref 4–13)
ANION GAP SERPL CALCULATED.3IONS-SCNC: 6 MMOL/L (ref 4–13)
ATRIAL RATE: 89 BPM
ATRIAL RATE: 97 BPM
BACTERIA UR CULT: ABNORMAL
BUN SERPL-MCNC: 12 MG/DL (ref 5–25)
BUN SERPL-MCNC: 13 MG/DL (ref 5–25)
CA-I BLD-SCNC: 1.04 MMOL/L (ref 1.12–1.32)
CALCIUM SERPL-MCNC: 7.7 MG/DL (ref 8.3–10.1)
CALCIUM SERPL-MCNC: 8 MG/DL (ref 8.3–10.1)
CARDIAC TROPONIN I PNL SERPL HS: 246 NG/L
CARDIAC TROPONIN I PNL SERPL HS: 287 NG/L
CHLORIDE SERPL-SCNC: 101 MMOL/L (ref 96–108)
CHLORIDE SERPL-SCNC: 102 MMOL/L (ref 96–108)
CO2 SERPL-SCNC: 26 MMOL/L (ref 21–32)
CO2 SERPL-SCNC: 26 MMOL/L (ref 21–32)
CREAT SERPL-MCNC: 0.67 MG/DL (ref 0.6–1.3)
CREAT SERPL-MCNC: 0.71 MG/DL (ref 0.6–1.3)
ERYTHROCYTE [DISTWIDTH] IN BLOOD BY AUTOMATED COUNT: 14 % (ref 11.6–15.1)
ERYTHROCYTE [DISTWIDTH] IN BLOOD BY AUTOMATED COUNT: 14.1 % (ref 11.6–15.1)
GFR SERPL CREATININE-BSD FRML MDRD: 95 ML/MIN/1.73SQ M
GFR SERPL CREATININE-BSD FRML MDRD: 97 ML/MIN/1.73SQ M
GLUCOSE SERPL-MCNC: 115 MG/DL (ref 65–140)
GLUCOSE SERPL-MCNC: 131 MG/DL (ref 65–140)
HCT VFR BLD AUTO: 27.4 % (ref 36.5–49.3)
HCT VFR BLD AUTO: 27.9 % (ref 36.5–49.3)
HGB BLD-MCNC: 9.1 G/DL (ref 12–17)
HGB BLD-MCNC: 9.3 G/DL (ref 12–17)
MAGNESIUM SERPL-MCNC: 2.5 MG/DL (ref 1.6–2.6)
MAGNESIUM SERPL-MCNC: 2.6 MG/DL (ref 1.6–2.6)
MCH RBC QN AUTO: 35.4 PG (ref 26.8–34.3)
MCH RBC QN AUTO: 35.5 PG (ref 26.8–34.3)
MCHC RBC AUTO-ENTMCNC: 33.2 G/DL (ref 31.4–37.4)
MCHC RBC AUTO-ENTMCNC: 33.3 G/DL (ref 31.4–37.4)
MCV RBC AUTO: 107 FL (ref 82–98)
MCV RBC AUTO: 107 FL (ref 82–98)
NRBC BLD AUTO-RTO: 0 /100 WBCS
NRBC BLD AUTO-RTO: 0 /100 WBCS
P AXIS: -87 DEGREES
PLATELET # BLD AUTO: 116 THOUSANDS/UL (ref 149–390)
PLATELET # BLD AUTO: 123 THOUSANDS/UL (ref 149–390)
PMV BLD AUTO: 10.1 FL (ref 8.9–12.7)
PMV BLD AUTO: 9.7 FL (ref 8.9–12.7)
POTASSIUM SERPL-SCNC: 3.5 MMOL/L (ref 3.5–5.3)
POTASSIUM SERPL-SCNC: 3.6 MMOL/L (ref 3.5–5.3)
PR INTERVAL: 0 MS
PR INTERVAL: 217 MS
QRS AXIS: -60 DEGREES
QRS AXIS: -64 DEGREES
QRS AXIS: -68 DEGREES
QRS AXIS: 81 DEGREES
QRSD INTERVAL: 146 MS
QRSD INTERVAL: 150 MS
QRSD INTERVAL: 171 MS
QRSD INTERVAL: 96 MS
QT INTERVAL: 363 MS
QT INTERVAL: 388 MS
QT INTERVAL: 446 MS
QT INTERVAL: 475 MS
QTC INTERVAL: 442 MS
QTC INTERVAL: 508 MS
QTC INTERVAL: 543 MS
QTC INTERVAL: 604 MS
RBC # BLD AUTO: 2.57 MILLION/UL (ref 3.88–5.62)
RBC # BLD AUTO: 2.62 MILLION/UL (ref 3.88–5.62)
SODIUM SERPL-SCNC: 133 MMOL/L (ref 135–147)
SODIUM SERPL-SCNC: 133 MMOL/L (ref 135–147)
T WAVE AXIS: 114 DEGREES
T WAVE AXIS: 130 DEGREES
T WAVE AXIS: 134 DEGREES
T WAVE AXIS: 196 DEGREES
VENTRICULAR RATE: 103 BPM
VENTRICULAR RATE: 89 BPM
VENTRICULAR RATE: 89 BPM
VENTRICULAR RATE: 97 BPM
WBC # BLD AUTO: 15.23 THOUSAND/UL (ref 4.31–10.16)
WBC # BLD AUTO: 15.53 THOUSAND/UL (ref 4.31–10.16)

## 2023-03-08 PROCEDURE — 3E0102A INTRODUCTION OF ANTI-INFECTIVE ENVELOPE INTO SUBCUTANEOUS TISSUE, OPEN APPROACH: ICD-10-PCS | Performed by: INTERNAL MEDICINE

## 2023-03-08 PROCEDURE — 02HL3KZ INSERTION OF DEFIBRILLATOR LEAD INTO LEFT VENTRICLE, PERCUTANEOUS APPROACH: ICD-10-PCS | Performed by: INTERNAL MEDICINE

## 2023-03-08 PROCEDURE — 0JH609Z INSERTION OF CARDIAC RESYNCHRONIZATION DEFIBRILLATOR PULSE GENERATOR INTO CHEST SUBCUTANEOUS TISSUE AND FASCIA, OPEN APPROACH: ICD-10-PCS | Performed by: INTERNAL MEDICINE

## 2023-03-08 PROCEDURE — 02HK3KZ INSERTION OF DEFIBRILLATOR LEAD INTO RIGHT VENTRICLE, PERCUTANEOUS APPROACH: ICD-10-PCS | Performed by: INTERNAL MEDICINE

## 2023-03-08 PROCEDURE — 02H63KZ INSERTION OF DEFIBRILLATOR LEAD INTO RIGHT ATRIUM, PERCUTANEOUS APPROACH: ICD-10-PCS | Performed by: INTERNAL MEDICINE

## 2023-03-08 DEVICE — LEAD 383069 MRI US
Type: IMPLANTABLE DEVICE | Site: HEART | Status: FUNCTIONAL
Brand: SELECTSECURE™ MRI SURESCAN™

## 2023-03-08 DEVICE — CRTD DTMA1D4 CLARIA MRI US DF-4
Type: IMPLANTABLE DEVICE | Site: CHEST  WALL | Status: FUNCTIONAL
Brand: CLARIA MRI™ CRT-D SURESCAN™

## 2023-03-08 DEVICE — ENVELOPE CMRM6133 ABSORB LRG MR
Type: IMPLANTABLE DEVICE | Site: CHEST  WALL | Status: FUNCTIONAL
Brand: TYRX™

## 2023-03-08 DEVICE — LEAD 6935M62 QUATTRO SECURE S MRI US
Type: IMPLANTABLE DEVICE | Site: HEART | Status: FUNCTIONAL
Brand: SPRINT QUATTRO SECURE S MRI™ SURESCAN™

## 2023-03-08 DEVICE — LEAD 457453 MRI US BI RCMCRD MVC
Type: IMPLANTABLE DEVICE | Site: HEART | Status: FUNCTIONAL
Brand: CAPSURE SENSE MRI™ SURESCAN™

## 2023-03-08 RX ORDER — LORAZEPAM 0.5 MG/1
0.5 TABLET ORAL ONCE
Status: COMPLETED | OUTPATIENT
Start: 2023-03-08 | End: 2023-03-08

## 2023-03-08 RX ORDER — CEFAZOLIN SODIUM 2 G/50ML
2000 SOLUTION INTRAVENOUS ONCE
Status: COMPLETED | OUTPATIENT
Start: 2023-03-08 | End: 2023-03-08

## 2023-03-08 RX ORDER — FENTANYL CITRATE 50 UG/ML
INJECTION, SOLUTION INTRAMUSCULAR; INTRAVENOUS AS NEEDED
Status: DISCONTINUED | OUTPATIENT
Start: 2023-03-08 | End: 2023-03-08

## 2023-03-08 RX ORDER — LIDOCAINE HYDROCHLORIDE 10 MG/ML
INJECTION, SOLUTION EPIDURAL; INFILTRATION; INTRACAUDAL; PERINEURAL CODE/TRAUMA/SEDATION MEDICATION
Status: DISCONTINUED | OUTPATIENT
Start: 2023-03-08 | End: 2023-03-08 | Stop reason: HOSPADM

## 2023-03-08 RX ORDER — LIDOCAINE 50 MG/G
1 PATCH TOPICAL DAILY
Status: DISCONTINUED | OUTPATIENT
Start: 2023-03-09 | End: 2023-03-13 | Stop reason: HOSPADM

## 2023-03-08 RX ORDER — POTASSIUM CHLORIDE 14.9 MG/ML
INJECTION INTRAVENOUS CONTINUOUS PRN
Status: DISCONTINUED | OUTPATIENT
Start: 2023-03-08 | End: 2023-03-08

## 2023-03-08 RX ORDER — OXYCODONE HCL 5 MG/5 ML
5 SOLUTION, ORAL ORAL EVERY 4 HOURS PRN
Status: DISCONTINUED | OUTPATIENT
Start: 2023-03-08 | End: 2023-03-13 | Stop reason: HOSPADM

## 2023-03-08 RX ORDER — HYDROMORPHONE HCL IN WATER/PF 6 MG/30 ML
0.2 PATIENT CONTROLLED ANALGESIA SYRINGE INTRAVENOUS EVERY 4 HOURS PRN
Status: DISCONTINUED | OUTPATIENT
Start: 2023-03-08 | End: 2023-03-11

## 2023-03-08 RX ORDER — PROPOFOL 10 MG/ML
INJECTION, EMULSION INTRAVENOUS AS NEEDED
Status: DISCONTINUED | OUTPATIENT
Start: 2023-03-08 | End: 2023-03-08

## 2023-03-08 RX ORDER — GABAPENTIN 100 MG/1
100 CAPSULE ORAL
Status: DISCONTINUED | OUTPATIENT
Start: 2023-03-08 | End: 2023-03-13 | Stop reason: HOSPADM

## 2023-03-08 RX ORDER — POTASSIUM CHLORIDE 14.9 MG/ML
20 INJECTION INTRAVENOUS ONCE
Status: COMPLETED | OUTPATIENT
Start: 2023-03-08 | End: 2023-03-08

## 2023-03-08 RX ORDER — MAGNESIUM SULFATE HEPTAHYDRATE 40 MG/ML
2 INJECTION, SOLUTION INTRAVENOUS ONCE
Status: COMPLETED | OUTPATIENT
Start: 2023-03-08 | End: 2023-03-08

## 2023-03-08 RX ORDER — NADOLOL 40 MG/1
80 TABLET ORAL
Status: DISCONTINUED | OUTPATIENT
Start: 2023-03-08 | End: 2023-03-13 | Stop reason: HOSPADM

## 2023-03-08 RX ORDER — CALCIUM GLUCONATE 20 MG/ML
2 INJECTION, SOLUTION INTRAVENOUS ONCE
Status: COMPLETED | OUTPATIENT
Start: 2023-03-08 | End: 2023-03-08

## 2023-03-08 RX ORDER — PROPOFOL 10 MG/ML
INJECTION, EMULSION INTRAVENOUS CONTINUOUS PRN
Status: DISCONTINUED | OUTPATIENT
Start: 2023-03-08 | End: 2023-03-08

## 2023-03-08 RX ORDER — GENTAMICIN SULFATE 40 MG/ML
INJECTION, SOLUTION INTRAMUSCULAR; INTRAVENOUS CODE/TRAUMA/SEDATION MEDICATION
Status: DISCONTINUED | OUTPATIENT
Start: 2023-03-08 | End: 2023-03-08 | Stop reason: HOSPADM

## 2023-03-08 RX ORDER — POTASSIUM CHLORIDE 20 MEQ/1
40 TABLET, EXTENDED RELEASE ORAL ONCE
Status: COMPLETED | OUTPATIENT
Start: 2023-03-08 | End: 2023-03-08

## 2023-03-08 RX ORDER — OXYCODONE HCL 5 MG/5 ML
2.5 SOLUTION, ORAL ORAL EVERY 4 HOURS PRN
Status: DISCONTINUED | OUTPATIENT
Start: 2023-03-08 | End: 2023-03-13 | Stop reason: HOSPADM

## 2023-03-08 RX ADMIN — PROPOFOL 20 MCG/KG/MIN: 10 INJECTION, EMULSION INTRAVENOUS at 14:04

## 2023-03-08 RX ADMIN — ENOXAPARIN SODIUM 40 MG: 40 INJECTION SUBCUTANEOUS at 08:04

## 2023-03-08 RX ADMIN — ASPIRIN 81 MG: 81 TABLET, CHEWABLE ORAL at 08:04

## 2023-03-08 RX ADMIN — FENTANYL CITRATE 25 MCG: 50 INJECTION INTRAMUSCULAR; INTRAVENOUS at 14:26

## 2023-03-08 RX ADMIN — THIAMINE HCL TAB 100 MG 100 MG: 100 TAB at 08:04

## 2023-03-08 RX ADMIN — PHENYLEPHRINE HYDROCHLORIDE 40 MCG/MIN: 10 INJECTION INTRAVENOUS at 14:12

## 2023-03-08 RX ADMIN — GABAPENTIN 100 MG: 100 CAPSULE ORAL at 22:19

## 2023-03-08 RX ADMIN — PROPOFOL 60 MCG/KG/MIN: 10 INJECTION, EMULSION INTRAVENOUS at 16:03

## 2023-03-08 RX ADMIN — NADOLOL 80 MG: 40 TABLET ORAL at 18:14

## 2023-03-08 RX ADMIN — POTASSIUM CHLORIDE 20 MEQ: 14.9 INJECTION, SOLUTION INTRAVENOUS at 05:37

## 2023-03-08 RX ADMIN — METOPROLOL TARTRATE 25 MG: 25 TABLET, FILM COATED ORAL at 08:04

## 2023-03-08 RX ADMIN — OXYCODONE HYDROCHLORIDE 5 MG: 5 SOLUTION ORAL at 18:01

## 2023-03-08 RX ADMIN — MAGNESIUM SULFATE HEPTAHYDRATE 2 G: 40 INJECTION, SOLUTION INTRAVENOUS at 10:00

## 2023-03-08 RX ADMIN — PANTOPRAZOLE SODIUM 40 MG: 40 TABLET, DELAYED RELEASE ORAL at 05:31

## 2023-03-08 RX ADMIN — POTASSIUM CHLORIDE 40 MEQ: 1500 TABLET, EXTENDED RELEASE ORAL at 05:31

## 2023-03-08 RX ADMIN — SODIUM CHLORIDE 100 ML/HR: 0.9 INJECTION, SOLUTION INTRAVENOUS at 02:00

## 2023-03-08 RX ADMIN — LIDOCAINE HYDROCHLORIDE ANHYDROUS AND DEXTROSE MONOHYDRATE 1 MG/MIN: .8; 5 INJECTION, SOLUTION INTRAVENOUS at 17:13

## 2023-03-08 RX ADMIN — CEFAZOLIN SODIUM 2000 MG: 2 SOLUTION INTRAVENOUS at 14:05

## 2023-03-08 RX ADMIN — DEXMEDETOMIDINE HYDROCHLORIDE 0.4 MCG/KG/HR: 400 INJECTION INTRAVENOUS at 05:49

## 2023-03-08 RX ADMIN — POTASSIUM CHLORIDE: 14.9 INJECTION, SOLUTION INTRAVENOUS at 14:39

## 2023-03-08 RX ADMIN — PROPOFOL 60 MCG/KG/MIN: 10 INJECTION, EMULSION INTRAVENOUS at 15:20

## 2023-03-08 RX ADMIN — OXYCODONE HYDROCHLORIDE 5 MG: 5 SOLUTION ORAL at 22:19

## 2023-03-08 RX ADMIN — FOLIC ACID 1 MG: 1 TABLET ORAL at 08:04

## 2023-03-08 RX ADMIN — HYDROMORPHONE HYDROCHLORIDE 0.2 MG: 0.2 INJECTION, SOLUTION INTRAMUSCULAR; INTRAVENOUS; SUBCUTANEOUS at 19:26

## 2023-03-08 RX ADMIN — Medication 1 TABLET: at 08:04

## 2023-03-08 RX ADMIN — FENTANYL CITRATE 25 MCG: 50 INJECTION INTRAMUSCULAR; INTRAVENOUS at 14:04

## 2023-03-08 RX ADMIN — CALCIUM GLUCONATE 2 G: 20 INJECTION, SOLUTION INTRAVENOUS at 05:41

## 2023-03-08 RX ADMIN — LORAZEPAM 0.5 MG: 0.5 TABLET ORAL at 22:19

## 2023-03-08 RX ADMIN — ATORVASTATIN CALCIUM 20 MG: 20 TABLET, FILM COATED ORAL at 18:00

## 2023-03-08 RX ADMIN — MELATONIN 6 MG: at 19:25

## 2023-03-08 RX ADMIN — PROPOFOL 60 MG: 10 INJECTION, EMULSION INTRAVENOUS at 14:15

## 2023-03-08 NOTE — ANESTHESIA PREPROCEDURE EVALUATION
P/w Torsades s/p cpr and DCCV  EF35-40%, tapse 2 4cm  Dilated RV, dilated LA, mod TV, RVSP 73    Precedex/Lido gtt    Procedure:  Cardiac icd implant (Chest)    Relevant Problems   ANESTHESIA (within normal limits)      CARDIO   (+) Essential hypertension   (+) Hypercholesterolemia   (+) Paroxysmal A-fib (HCC)   (+) Ventricular fibrillation (HCC)      GI/HEPATIC   (+) Esophageal reflux      /RENAL   (+) BPH with obstruction/lower urinary tract symptoms      HEMATOLOGY   (+) Autoimmune hemolytic anemia (HCC)   (+) Iron deficiency anemia      MUSCULOSKELETAL   (+) DDD (degenerative disc disease), cervical      PULMONARY   (+) DIXIE not currently treated      Other   (+) CLL (chronic lymphocytic leukemia) (HCC)        Physical Exam    Airway    Mallampati score: II  TM Distance: >3 FB  Neck ROM: full     Dental       Cardiovascular  Rate: normal,     Pulmonary  Pulmonary exam normal     Other Findings  Per pt denies anything remaining that is loose or removeable      Anesthesia Plan  ASA Score- 4     Anesthesia Type- IV sedation with anesthesia with ASA Monitors  Additional Monitors:   Airway Plan:     Comment: Per patient, appropriately NPO, denies active CP/SOB/wheezing/symptoms related to heartburn/nausea/vomiting    Plan Factors-Exercise tolerance (METS): <4 METS  Chart reviewed  Patient summary reviewed  Patient is a current smoker  Induction- intravenous  Postoperative Plan- Plan for postoperative opioid use  Informed Consent- Anesthetic plan and risks discussed with patient, spouse and son  I personally reviewed this patient with the CRNA  Discussed and agreed on the Anesthesia Plan with the CRNA  Yenny Jackson

## 2023-03-08 NOTE — ANESTHESIA POSTPROCEDURE EVALUATION
Post-Op Assessment Note    CV Status:  Stable  Pain Score: 0    Pain management: adequate     Mental Status:  Awake   Hydration Status:  Euvolemic   PONV Controlled:  Controlled   Airway Patency:  Patent      Post Op Vitals Reviewed: Yes      Staff: CRNA         No notable events documented      BP   101/81   Temp      Pulse  110   Resp  20   SpO2   97%

## 2023-03-08 NOTE — SEPSIS NOTE
Sepsis Note   Silviail Cluster 81 y o  male MRN: [id number]  Unit/Bed#: ICU 04 Encounter: [id number]       Initial Sepsis Screening     [address] Name 03/06/23 1511                Is the patient's history suggestive of a new or worsening infection? Yes (Proceed)  -CJ        Suspected source of infection suspect infection, source unknown  -CJ        Indicate SIRS criteria Hyperthemia > 38 3C (100 9F) OR Hypothermia <36C (96 8F); Tachycardia > 90 bpm  -CJ        Are two or more of the above signs & symptoms of infection both present and new to the patient? Yes (Proceed)  -CJ        Assess for evidence of organ dysfunction: Are any of the below criteria present within 6 hours of suspected infection and SIRS criteria that are NOT considered to be chronic conditions? Lactate >/equal 4 0  -CJ        Date of presentation of septic shock 03/06/23  -CJ        Time of presentation of septic shock 1511  -CJ        Fluid Resuscitation: 30 ml/kg IV fluid bolus will be given based on ideal body weight (use if BMI >30)  -CJ        Is the patient is persistently hypotensive in the hour after fluid bolus administration? If yes, patient meets criteria for vasopressor use  NO  -CJ        Sepsis Note: Click "NEXT" below (NOT "close") to generate sepsis note based on above information  YES (proceed by clicking "NEXT")  -              User Key  (r) = Recorded By, (t) = Taken By, (c) = Cosigned By    [address] Name Provider Type    Vee And Demarco Streets, DO Physician                    Body mass index is 29 8 kg/m²    Wt Readings from Last 1 Encounters:   03/07/23 86 2 kg (190 lb 0 6 oz)     IBW (Ideal Body Weight): 68 4 kg    Ideal body weight: 68 4 kg (150 lb 12 7 oz)  Adjusted ideal body weight: 76 6 kg (168 lb 14 oz)

## 2023-03-08 NOTE — PROGRESS NOTES
Daily Progress Note - [facility] TIFFANIE Willams 81 y o  male MRN: [id number]  Unit/Bed#: MICU 05 Encounter: [id number]        ----------------------------------------------------------------------------------------  HPI/24hr events: Lynne Orellana is a 81 y o  male who presents with PMH of paroxysmal afib, HTN, long QT, alcohol use, marijuana use, dementia and CLL  Pt initially presented to [facility] ED 3/6 for loss of consciousness  Per the pt's wife, they were watching a movie when the pt told his wife that he was not feeling well and immediately lost consciousness  EMS arrived and found the patient to be unresponsive  The pt was found to be in V-fib and patient was shocked x1 and underwent CPR  ROSC achieved in the field  Upon arrival to the ED, the pt was found to be breathing spontaneously, alert, awake, but lethargic  Pt had similar episodes last year with concern of possible seizure activity  EKG showed A-fib with RVR  Pt denies chest pain, fevers, chills, nausea, or vomiting at this time       Cardiology was consulted and reviewed the ECG which revealed a regular narrow complex tachycardia suggesting SVT  Pt was then given adenosine  Pt was found to have elevated troponin, a low grade fever and elevated WBC showing evidence of sepsis  Urinalysis showed WBC and innumerable bacteria  Pt was started on ceftriaxone  Neurology was consulted for evaluation of his loss of consciousness and planned for CTA head/neck to exclude basilar/other critical vessel abnormalities that could lead to LOC/hypoperfusion and syncope  Otherwise neurology workup was unremarkable       Yesterday the pt was receiving an echo when the pt developed v fib arrest and went into torsades with QT of 671 ms  Started on lidocaine drip  Pt came out of arrhythmia prior to defibrillation  The echo showed apex hypokinesis which is new prior to echo   Pt received cardiac cath in [facility], had temporary pacer placed for recurrent torsades and has been transferred to [facility] MICU with plans for ICD placement  Last night pt was disoriented and confused and required precedex to control his agitation and dementia  Cardiology is planning for ICD placement today      ---------------------------------------------------------------------------------------  SUBJECTIVE  Pt seen and examined  Lying in bed, resting comfortably  Pt was oriented x3 and pleasant during exam      Review of Systems   Constitutional: Negative for chills and diaphoresis  HENT: Negative for facial swelling, sinus pressure and sore throat  Eyes: Negative for pain and discharge  Respiratory: Negative for cough and shortness of breath  Cardiovascular: Negative for chest pain  Gastrointestinal: Negative for abdominal pain and nausea  Genitourinary: Negative for dysuria  Musculoskeletal: Negative for myalgias  Neurological: Negative for dizziness and headaches       Review of systems was reviewed and negative unless stated above in HPI/24-hour events   ---------------------------------------------------------------------------------------  Assessment and Plan:    Neuro:   • Diagnosis: Syncope   o Plan: Likely 2/2 arrhythmia; torsades/VT/Vfib  o Plan for ICD with EP   o Currently on liodcain gtt and overdrive paced   • Diagnosis: Hx of Dementia   o Plan: avoid QT prolonging peds   o Precedex to calm pt with temporay pacer       CV:   • Diagnosis: Torsades with QT on ECG of 671   o Plan: continue lidocaine drip, avoid amiodarone   o Plan for ICD with E  o Avoid QT prolonging meds   o EP/Cards consulted   • Diagnosis: S/P cardiac cath   o Plan: no flow significant atherosclerotic lesions were identified; temporary pacing wire placed for recurrent torsade most recently prior to cath procedure  o 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   • Diagnosis: HTN  o Continue home norvasc  • Diagnosis: Paroxysmal Afib  o Continue metoprolol per cards recs  o Not on [facility]   • Diagnosis: HLD o Continue home atorvastatin     Pulm:  • Diagnosis: no acute issues       GI:   • Diagnosis: GERD   o Plan: continue home protonix  o Currently NPO for ICD procedure      :   • Diagnosis: UTI   o Plan: SIRS criteria, culture >100k GNR  o Started on ceftriaxone       F/E/N:   • Fluids: n/a  • Electrolyte: replete PRN  • Nutrition: NPO since midnight      Heme/Onc:   • Diagnosis: Hx of CLL  o Plan: managed outpatient   o Baseline wbc of 14k-18k   o WBC of 15k currently       Endo:   • Diagnosis: no acute concerns       ID:   • Diagnosis: Sepsis   o Plan: leukocytosis, fevers, with possible urinary source given history of straight cathing for neurogenic bladder  o Continue ceftriaxone       MSK/Skin:   • Diagnosis: no acute concerns       Patient appropriate for transfer out of the ICU today?: No  Disposition: Continue Critical Care   Code Status: Level 1 - Full Code  ---------------------------------------------------------------------------------------  ICU CORE MEASURES    Prophylaxis   VTE Pharmacologic Prophylaxis: Enoxaparin (Lovenox)  VTE Mechanical Prophylaxis: sequential compression device  Stress Ulcer Prophylaxis: Pantoprazole PO    ABCDE Protocol (if indicated)  Plan to perform spontaneous awakening trial today? Not applicable  Plan to perform spontaneous breathing trial today? Not applicable  Obvious barriers to extubation? Not applicable  CAM-ICU: Negative    Invasive Devices Review  Invasive Devices     Peripheral Intravenous Line  Duration           Peripheral IV 03/06/23 Dorsal (posterior); Left Hand 2 days    Peripheral IV 03/06/23 Right Antecubital 2 days    Long-Dwell Peripheral IV (Midline) 03/07/23 Left <1 day          Line  Duration           Venous Sheath 6 Fr  Right Femoral <1 day          Drain  Duration           Urethral Catheter Temperature probe 16 Fr  1 day              Can any invasive devices be discontinued today?  Not applicable  ---------------------------------------------------------------------------------------  OBJECTIVE    Vitals   Vitals:    23 0400 23 0500 23 0547 23 0600   BP: 118/77 111/75  128/79   BP Location: Right arm      Pulse: 88 90  100   Resp: 18 18  20   Temp: 98 2 °F (36 8 °C) 98 2 °F (36 8 °C)  98 6 °F (37 °C)   TempSrc: Bladder      SpO2: 95% 98%  97%   Weight:   90 5 kg (199 lb 8 3 oz)    Height:         Temp (24hrs), Av 2 °F (36 8 °C), Min:97 5 °F (36 4 °C), Max:98 6 °F (37 °C)  Current: Temperature: 98 6 °F (37 °C)  HR: 88  BP: 118/77  RR: 18  SpO2: 95    Respiratory:  SpO2: SpO2: 98 %, SpO2 Activity: SpO2 Activity: At Rest, SpO2 Device: O2 Device: Nasal cannula, Capnography:    Nasal Cannula O2 Flow Rate (L/min): 2 L/min    Invasive/non-invasive ventilation settings   Respiratory    Lab Data (Last 4 hours)    None         O2/Vent Data (Last 4 hours)    None                Physical Exam  Vitals reviewed  Constitutional:       General: He is not in acute distress  Appearance: He is not ill-appearing  HENT:      Head: Normocephalic and atraumatic  Right Ear: External ear normal       Left Ear: External ear normal       Mouth/Throat:      Mouth: Mucous membranes are moist    Cardiovascular:      Rate and Rhythm: Normal rate and regular rhythm  Pulses: Normal pulses  Heart sounds: Normal heart sounds  Pulmonary:      Effort: Pulmonary effort is normal       Breath sounds: Normal breath sounds  Abdominal:      General: Abdomen is flat  Bowel sounds are normal       Palpations: Abdomen is soft  Tenderness: There is no abdominal tenderness  There is no guarding  Skin:     General: Skin is warm and dry  Neurological:      General: No focal deficit present  Mental Status: He is alert and oriented to person, place, and time  Psychiatric:         Mood and Affect: Mood normal          Behavior: Behavior normal          Thought Content:  Thought content normal          Judgment: Judgment normal                Laboratory and Diagnostics:  Results from last 7 days   Lab Units 03/08/23 0427 03/07/23 2326 03/07/23  0605 03/06/23  1357   WBC Thousand/uL 15 53* 15 23* 26 79* 49 68*   HEMOGLOBIN g/dL 9 3* 9 1* 10 8* 13 2   HEMATOCRIT % 27 9* 27 4* 31 2* 38 6   PLATELETS Thousands/uL 116* 123* 163 228   BANDS PCT %  --   --   --  8   MONO PCT %  --   --   --  0*     Results from last 7 days   Lab Units 03/08/23 0427 03/07/23 2326 03/07/23 0605 03/06/23  1357   SODIUM mmol/L 133* 133* 130* 133*   POTASSIUM mmol/L 3 5 3 6 4 0 4 1   CHLORIDE mmol/L 102 101 95* 98   CO2 mmol/L 26 26 24 20*   ANION GAP mmol/L 5 6 11 15*   BUN mg/dL 13 12 14 10   CREATININE mg/dL 0 67 0 71 0 91 0 96   CALCIUM mg/dL 8 0* 7 7* 8 4 9 4   GLUCOSE RANDOM mg/dL 115 131 135 234*   ALT U/L  --   --  30 39   AST U/L  --   --  31 49*   ALK PHOS U/L  --   --  66 88   ALBUMIN g/dL  --   --  4 4 4 9   TOTAL BILIRUBIN mg/dL  --   --  0 92 1 01*     Results from last 7 days   Lab Units 03/08/23 0427 03/07/23 2326 03/07/23 0605 03/06/23  1357   MAGNESIUM mg/dL 2 6 2 5 2 2 1 9   PHOSPHORUS mg/dL  --   --   --  5 1*      Results from last 7 days   Lab Units 03/06/23  1357   INR  1 25*   PTT seconds 26          Results from last 7 days   Lab Units 03/06/23  1541 03/06/23  1357   LACTIC ACID mmol/L 3 5* 5 4*     ABG:    VBG:    Results from last 7 days   Lab Units 03/06/23  1357   PROCALCITONIN ng/ml 0 05       Micro  Results from last 7 days   Lab Units 03/06/23  1424 03/06/23  1400 03/06/23  1357   BLOOD CULTURE   --  No Growth at 24 hrs  No Growth at 24 hrs  URINE CULTURE  >100,000 cfu/ml Escherichia coli*  --   --        EKG: 3/8: shows prolonged QT/QTc 475/604 ms   Imaging: 3/6 CXR no acute cardiopulmonary findings, CT head wo contrast: no acute intracranial abnormality   3/7 CTA head and neck wo contrast: CT brain shows stable midl chronic microangiopatchic change with no acute intracranial abnormality identified; CT angio: mild atherosclerotic disease of the carotid birfurcations and intracranial vasculature without flow restrictive stenosis  No occlusion or thrombosis I have personally reviewed pertinent reports  Intake and Output  I/O       03/06 0701 03/07 0700 03/07 0701 03/08 0700 03/08 0701 03/09 0700    I V  (mL/kg)  1321 7 (14 6)     IV Piggyback  100     Total Intake(mL/kg)  1421 7 (15 7)     Urine (mL/kg/hr)  445     Total Output  445     Net  +976 7                UOP: 470 ml     Height and Weights   Height: 5' 8" (172 7 cm)  IBW (Ideal Body Weight): 68 4 kg  Body mass index is 30 34 kg/m²  Weight (last 2 days)     Date/Time Weight    03/08/23 0547 90 5 (199 52)    03/07/23 1811 86 2 (190 04)            Nutrition       Diet Orders   (From admission, onward)             Start     Ordered    03/08/23 0001  Diet NPO; Sips with meds  Diet effective midnight        References:    Nutrtion Support Algorithm Enteral vs  Parenteral   Question Answer Comment   Diet Type NPO    NPO Except: Sips with meds    RD to adjust diet per protocol?  Yes        03/07/23 2034                    Active Medications  Scheduled Meds:  Current Facility-Administered Medications   Medication Dose Route Frequency Provider Last Rate   • acetaminophen  650 mg Oral Q6H PRN MATHEW Fry     • aspirin  81 mg Oral Daily MATHEW Fry     • atorvastatin  20 mg Oral Daily With [address] , [address] MATHEW Borges     • cefTRIAXone  1,000 mg Intravenous Q24H MATHEW Fry     • dexmedetomidine  0 1-1 3 mcg/kg/hr Intravenous Titrated Ankita Shillings, DO 0 4 mcg/kg/hr (03/08/23 0549)   • enoxaparin  40 mg Subcutaneous Daily MATHEW Fry     • folic acid  1 mg Oral Daily MATHEW Fry     • lidocaine in dextrose 5%  1 mg/min Intravenous Continuous MATHEW Fry 1 mg/min (03/07/23 1821)   • melatonin  6 mg Oral HS MATHEW Ortega • metoprolol tartrate  25 mg Oral Q12H [facility] Ord, CRNP     • montelukast  10 mg Oral HS Percilla Hoop Ord, CRNP     • multivitamin-minerals  1 tablet Oral Daily Percilla Hoop WAXUXE, CRNP     • pantoprazole  40 mg Oral Early Morning Percilla Hoop Sonday, CRNP     • potassium chloride  20 mEq Intravenous Once Supriya Nevkirstin, DO 20 mEq (03/08/23 0537)   • sodium chloride  100 mL/hr Intravenous Continuous Percilla Hoop Sonday, CRNP 100 mL/hr (03/08/23 0200)   • thiamine  100 mg Oral Daily Percilla Hoop Sonday, CRNP       Continuous Infusions:  dexmedetomidine, 0 1-1 3 mcg/kg/hr, Last Rate: 0 4 mcg/kg/hr (03/08/23 0549)  lidocaine in dextrose 5%, 1 mg/min, Last Rate: 1 mg/min (03/07/23 1821)  sodium chloride, 100 mL/hr, Last Rate: 100 mL/hr (03/08/23 0200)      PRN Meds:   acetaminophen, 650 mg, Q6H PRN        Allergies   Allergies   Allergen Reactions   • Codeine Other (See Comments)     agitated   • Sulfamethoxazole-Trimethoprim GI Intolerance and Abdominal Pain     upset stomach     ---------------------------------------------------------------------------------------  Advance Directive and Living Will: Yes    Power of :    POLST:    ---------------------------------------------------------------------------------------  Care Time Delivered:   45 min    Maru Singleton      Portions of the record may have been created with voice recognition software  Occasional wrong word or "sound a like" substitutions may have occurred due to the inherent limitations of voice recognition software    Read the chart carefully and recognize, using context, where substitutions have occurred

## 2023-03-08 NOTE — CONSULTS
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 in setting of History of Long QT (See Dr Yas Phelps note from 3/7/23 for telemetry images)  S/p Shock by EMS   --> Etiology, Multifactorial: Patient with underlying Long QT and recent genetically confirmed Long QT Syndrome Type 2 in family alexander (see down below) +/- recently treated for a UTI with antibiotics (unclear which one but potentially was a QT prolonging such as a Fluroquinolone as he had a cardiac arrest while at home)  Patient was also on Sertraline for depression which likely contributed to QT prolongation as well  --> Family History: Patients grandson (currently 14 months old, seen at [address]) was diagnosed with Long QT Syndrome Type 2 and his own son was also diagnosed with this same gene abnormality WITHOUT phenotype  2  Confusion Likely 2/2 Multiple Anoxic Insults from Cardiac Arrest vs  Less Likely Lidocaine induced (he has not been on it for long enough and dose is relatively low in setting of normal renal function)  3  Paroxsymal Atrial Fibrillation  4  Hypertension  5  Unlikely with Seizure Activity   --> This attributed diagnosis dates back to 09/2022 upon my chart review  As per documentation this "seizure activity" occurred in the setting of vigorous cough spells  This could have triggered VT/VF and as per documentation patient was only briefly out during these episodes before coming to   --> As per family, patient has been told in the past that he has seizure episodes and was on medication as a result  It is unlikely that he has this and more likely has been having brief episodes of VT/VF causing him to pass out mimicking seizures   In further support, patient has undergone EEG, CT, MRI for evaluation without any notable neurological etiology to explain "seizure-like activity"  6  LVEF 40% on recent Echo (3/7/23), presumably 2/2 Stunning from VT/VF and Defibrillation    Plan:  - Start Nadolol 80mg PO Daily  --> Long QT Type 2 is usually poorly responsive to BB however it is still worth a shot as some patients may respond  Down the road may consider Mexiletine as an AAD option   - Planning for ICD today, given confusion patients wife provided consent   - Extensive counseling of patients son Re Muniz) also provided  Brian Em follows with [address] [place] [address] Cardiology and we advised him that he should also follow EP there (suggested Dr Jose Joyner as a provider) to have discussion about his potential treatment plans going forward  - Avoid all QT prolonging meds going forward  - Repeat Echo prior to discharge or in outpatient setting to evaluate improvement in EF    Case discussed and reviewed with Dr Elizabeth العراقي pending attending addendum  Thank you for involving us in the care of your patient  Debra Rivers MD  Cardiology Fellow PGY-6    ==========================================================================================    History of Present Illness   Physician Requesting Consult: Altagracia Foster*  Reason for Consult / Principal Problem: Torsades de Pointes    HPI: Nevin Pena is a 81y o  year old male who presented with above mentioned history  Corroborative history obtained from wife and son at bedside given patients confusion  Wife states they were watching TV when he suddenly passed out and his eyes rolled back and was ultimately unresponsive  She called EMS, he soon regained consciousness briefly and then passed out again prior to their arrival  There is documentation that EMS witness VF and gave him 1 shock and started CPR       Prior to this episode, dating a few weeks to months back, wife and son both endorse that Mr Melodie Stringer is fully functional and able to perform his ADLs without any limitations  He also has no baseline mental abnormalities that preclude him from being independent  While at \[facility]", patient was undergoing an Echocardiogram upon which he suddenly passed out and rhythm was suggestive of TdP  QTc on ECG at the time ~670ms  Patient subsequently started on Lidocaine (avoided Amio 2/2 QTc)  He did not require defibrillation during this time  Echo at the time was notable for apical hypokinesis which was new from a prior echo  He underwent a LHC on 3/7/23 which was non-obstructive       EP consulted for device placement evaluation  Review of Systems  ROS as noted above in HPI  Historical Information   Past Medical History:   Diagnosis Date   • Anxiety    • BPH (benign prostatic hypertrophy)    • Cancer (HCC)     CLL   • CLL (chronic lymphocytic leukemia) ([facility] [address] )     2009   • Colon polyp    • Concussion     Resolved: 08/22/16   • Depression    • Diverticulitis 1/13/2020 2014 CT done 11/19 12/19 CT done    • E coli bacteremia 6/27/2021    2/2 blood cultures with Magalis Mcgowan appears to be the urine    • Epididymitis 5/19/2021 5/2021   • Gastrointestinal hemorrhage     Last assessed: 08/27/13   • GERD (gastroesophageal reflux disease)    • H/O vitamin D deficiency 7/7/2021   • Hearing loss of aging    • Hiatal hernia    • History of right hip replacement 4/19/2021   • History of transfusion    • Hyperlipidemia    • Hypertension    • Hyponatremia 3/6/2023   • Iron deficiency anemia    • Microscopic hematuria     Last assessed: 06/28/13   • OA (osteoarthritis)     right hip   • Pneumothorax    • Primary osteoarthritis of right hip 9/29/2017    He is status post right hip arthroplasty   • Prostatitis    • Pulmonary hypertension ([address] )    • Seasonal allergies    • Urinary tract infection associated with catheterization of urinary tract ([address] ) 2/5/2021    Pseudomonal UTI asymptomatic  Per Urology do not treat at this time  • Urinary tract infection associated with catheterization of urinary tract ([facility] [address] ) 2021    Pseudomonal UTI asymptomatic  Per Urology do not treat at this time    He did have urosepsis from E coli    • UTI (urinary tract infection)     in past   • Wears glasses      Past Surgical History:   Procedure Laterality Date   • BLADDER SURGERY     • COLONOSCOPY     • CYSTOSCOPY  10/09/2014    Diagnostic   • CYSTOSCOPY  2020   • FL CYSTOGRAM  08/15/2022   • FRACTURE SURGERY      left lower arm   • FRACTURE SURGERY      left femur   • HERNIA REPAIR     • JOINT REPLACEMENT Right     hip   • OTHER SURGICAL HISTORY  2011    Spinal anesthesia epidural   • MN ARTHRP ACETBLR/PROX FEM PROSTC AGRFT/ALGRFT Right 2017    Procedure: ARTHROPLASTY HIP TOTAL ANTERIOR;  Surgeon: La So MD;  Location: [facility];  Service: Orthopedics   • TONSILLECTOMY AND ADENOIDECTOMY     • TRANSURETHRAL RESECTION OF PROSTATE      x 2   • WRIST SURGERY       Social History     Substance and Sexual Activity   Alcohol Use Yes   • Alcohol/week: 10 0 standard drinks   • Types: 10 Cans of beer per week     Social History     Substance and Sexual Activity   Drug Use Yes   • Types: Marijuana    Comment: "medical marijuana"     Social History     Tobacco Use   Smoking Status Former   • Packs/day: 1 00   • Years: 15 00   • Pack years: 15 00   • Types: Cigarettes   • Start date: 67   • Quit date: 1980   • Years since quittin 5   Smokeless Tobacco Never     Family History:   Family History   Problem Relation Age of Onset   • No Known Problems Mother    • Heart attack Father    • Diabetes Brother    • Prostate cancer Brother        Meds/Allergies   Hospital Medications:   Current Facility-Administered Medications   Medication Dose Route Frequency   • acetaminophen (TYLENOL) tablet 650 mg  650 mg Oral Q6H PRN   • aspirin chewable tablet 81 mg  81 mg Oral Daily   • atorvastatin (LIPITOR) tablet 20 mg  20 mg Oral Daily With [employer] • ceFAZolin (ANCEF) IVPB (premix in dextrose) 2,000 mg 50 mL  2,000 mg Intravenous Once   • cefTRIAXone (ROCEPHIN) 1,000 mg in dextrose 5 % 50 mL IVPB  1,000 mg Intravenous Q24H   • dexmedeTOMIDine (Precedex) 400 mcg in sodium chloride 0 9% 100 mL  0 1-1 3 mcg/kg/hr Intravenous Titrated   • enoxaparin (LOVENOX) subcutaneous injection 40 mg  40 mg Subcutaneous Daily   • folic acid (FOLVITE) tablet 1 mg  1 mg Oral Daily   • lidocaine 2000 mg in 250 mL infusion (cardiac premix)- NOT FOR TREATMENT OF PAIN  1 mg/min Intravenous Continuous   • magnesium sulfate 2 g/50 mL IVPB (premix) 2 g  2 g Intravenous Once   • melatonin tablet 6 mg  6 mg Oral HS   • metoprolol tartrate (LOPRESSOR) tablet 25 mg  25 mg Oral Q12H [address]   • montelukast (SINGULAIR) tablet 10 mg  10 mg Oral HS   • multivitamin-minerals (CENTRUM) tablet 1 tablet  1 tablet Oral Daily   • pantoprazole (PROTONIX) EC tablet 40 mg  40 mg Oral Early Morning   • sodium chloride 0 9 % infusion  100 mL/hr Intravenous Continuous   • thiamine tablet 100 mg  100 mg Oral Daily     Home Medications:   Medications Prior to Admission   Medication   • Acetaminophen 500 MG   • amLODIPine (NORVASC) 5 mg tablet   • ascorbic acid (VITAMIN C) 250 MG CHEW   • aspirin 81 MG tablet   • atorvastatin (LIPITOR) 20 mg tablet   • Blood Pressure Monitoring (Omron 5 Series BP Monitor) PEE   • cetirizine (ZyrTEC) 10 mg tablet   • LORazepam (Ativan) 0 5 mg tablet   • metoprolol tartrate (LOPRESSOR) 25 mg tablet   • montelukast (SINGULAIR) 10 mg tablet   • Omega-3 Fatty Acids (FISH OIL) 1,000 mg   • omeprazole (PriLOSEC) 40 MG capsule   • sertraline (ZOLOFT) 50 mg tablet   • vitamin B-12 (CYANOCOBALAMIN) 500 MCG TABS       Allergies   Allergen Reactions   • Codeine Other (See Comments)     agitated   • Sulfamethoxazole-Trimethoprim GI Intolerance and Abdominal Pain     upset stomach       Objective   Vitals: Blood pressure 117/75, pulse 89, temperature 98 6 °F (37 °C), resp   rate 20, height 5' 8" (1 727 m), weight 90 5 kg (199 lb 8 3 oz), SpO2 98 %  Orthostatic Blood Pressures    Flowsheet Row Most Recent Value   Blood Pressure 117/75 filed at 03/08/2023 0804   Patient Position - Orthostatic VS Lying filed at 03/08/2023 0400            Intake/Output Summary (Last 24 hours) at 3/8/2023 1046  Last data filed at 3/8/2023 0800  Gross per 24 hour   Intake 1453 91 ml   Output 470 ml   Net 983 91 ml       Invasive Devices     Peripheral Intravenous Line  Duration           Peripheral IV 03/06/23 Dorsal (posterior); Left Hand 2 days    Peripheral IV 03/06/23 Right Antecubital 2 days    Long-Dwell Peripheral IV (Midline) 03/07/23 Left <1 day          Line  Duration           Venous Sheath 6 Fr  Right Femoral <1 day          Drain  Duration           Urethral Catheter Temperature probe 16 Fr  1 day                Physical Exam  GEN: Murphy Willams appears well, confused  HEENT:  Normocephalic, atraumatic, anicteric, moist mucous membranes  NECK: No JVD or carotid bruits   HEART: reg rhythm, reg rate, normal S1 and S2, no murmurs, clicks, gallops or rubs   LUNGS: Clear to auscultation bilaterally; no wheezes, rales, or rhonchi; respiration nonlabored   ABDOMEN:  Normoactive bowel sounds, soft, no tenderness, no distention  EXTREMITIES: peripheral pulses palpable; no edema  NEURO: no gross focal findings; cranial nerves grossly intact   SKIN:  Dry, intact, warm to touch    Lab Results: I have personally reviewed pertinent lab results      Results from last 7 days   Lab Units 03/08/23  0047 03/07/23 2326 03/06/23  1802   HS TNI 0HR ng/L  --  287*  --    HS TNI 2HR ng/L 246*  --   --    HS TNI 4HR ng/L  --   --  169*         Results from last 7 days   Lab Units 03/08/23  0427 03/07/23  2326 03/07/23  0605   POTASSIUM mmol/L 3 5 3 6 4 0   CO2 mmol/L 26 26 24   CHLORIDE mmol/L 102 101 95*   BUN mg/dL 13 12 14   CREATININE mg/dL 0 67 0 71 0 91     Results from last 7 days   Lab Units 03/08/23  0427 03/07/23 2326 23  0605   HEMOGLOBIN g/dL 9 3* 9 1* 10 8*   HEMATOCRIT % 27 9* 27 4* 31 2*   PLATELETS Thousands/uL 116* 123* 163     Results from last 7 days   Lab Units 23  0605   TRIGLYCERIDES mg/dL 87   HDL mg/dL 40   LDL CALC mg/dL 50   HEMOGLOBIN A1C % 5 4     Results from last 7 days   Lab Units 23  1357   INR  1 25*   PTT seconds 26       Imaging: I have personally reviewed pertinent reports  Previous STRESS TEST:  No results found for this or any previous visit  No results found for this or any previous visit  No results found for this or any previous visit  Previous Cath/PCI:  No results found for this or any previous visit  No results found for this or any previous visit  No results found for this or any previous visit  ECHO:  Results for orders placed during the hospital encounter of 01/10/21    Echo complete with contrast if indicated    Narrative  [place] [address], [address] [facility]  [phone]    Transthoracic Echocardiogram  2D, M-mode, Doppler, and Color Doppler    Study date:  15-Som-2021    Patient: Ceasar Munson  MR number: [id number]  Account number: [de-identified]  : 1950  Age: 78 years  Gender: Male  Status: Inpatient  Location: ICU  Height: 66 in  Weight: 195 lb  BP: 149/ 71 mmHg    Indications: New V-Tach, COVID-19  Diagnoses: I47 2 - Ventricular tachycardia    Sonographer:  HARJINDER Luong  Primary Physician:  Iram Mota MD  Referring Physician:  Atif Isaacs MD  Group:  [facility]  Interpreting Physician:  Sunita Cisneros DO    SUMMARY    SUMMARY:  Limited study  LEFT VENTRICLE:  Systolic function was normal  Ejection fraction was estimated to be 60 %  Although no diagnostic regional wall motion abnormality was identified, this possibility cannot be completely excluded on the basis of this study  RIGHT VENTRICLE:  The ventricle was mildly to moderately dilated    Systolic function was mildly reduced  Free wall hypokinesis  LEFT ATRIUM:  The atrium was dilated  MITRAL VALVE:  There was mild regurgitation  AORTIC VALVE:  There was mild to moderate regurgitation  TRICUSPID VALVE:  There was moderate regurgitation  Estimated peak PA pressure was 75 mmHg  IVC, HEPATIC VEINS:  The inferior vena cava was dilated, with poor inspiratory collapse, consistent with elevated right atrial pressure  HISTORY: PRIOR HISTORY: COVID-19 +, hypertension, PHTN, Luekemia, Alcohol Abuse, Former Smoker  PROCEDURE: The study was performed in the ICU  This was a routine study  The transthoracic approach was used  The study included complete 2D imaging, M-mode, complete spectral Doppler, and color Doppler  The heart rate was 47 bpm, at the  start of the study  Images were obtained from the parasternal, apical, subcostal, and suprasternal notch acoustic windows  Image quality was adequate  LEFT VENTRICLE: Size was normal  Systolic function was normal  Ejection fraction was estimated to be 60 %  Although no diagnostic regional wall motion abnormality was identified, this possibility cannot be completely excluded on the basis  of this study  RIGHT VENTRICLE: The ventricle was mildly to moderately dilated  Systolic function was mildly reduced  Free wall hypokinesis  Wall thickness was normal     LEFT ATRIUM: The atrium was dilated  RIGHT ATRIUM: Size was normal     MITRAL VALVE: Valve structure was normal  There was normal leaflet separation  DOPPLER: The transmitral velocity was within the normal range  There was no evidence for stenosis  There was mild regurgitation  AORTIC VALVE: The valve was trileaflet  Leaflets exhibited normal thickness and normal cuspal separation  DOPPLER: Transaortic velocity was within the normal range  There was no evidence for stenosis  There was mild to moderate  regurgitation      TRICUSPID VALVE: The valve structure was normal  There was normal leaflet separation  DOPPLER: The transtricuspid velocity was within the normal range  There was no evidence for stenosis  There was moderate regurgitation  Estimated peak PA  pressure was 75 mmHg  PULMONIC VALVE: Not well visualized  PERICARDIUM: There was no pericardial effusion  The pericardium was normal in appearance  AORTA: The root exhibited normal size  SYSTEMIC VEINS: IVC: The inferior vena cava was dilated, with poor inspiratory collapse, consistent with elevated right atrial pressure  SYSTEM MEASUREMENT TABLES    2D  %FS: 33 33 %  Ao Diam: 3 62 cm  EDV(Teich): 99 88 ml  EF(Teich): 62 02 %  ESV(Teich): 37 93 ml  IVSd: 1 15 cm  LA Area: 26 46 cm2  LA Diam: 4 59 cm  LVEDV MOD A4C: 166 8 ml  LVEF MOD A4C: 60 98 %  LVESV MOD A4C: 65 09 ml  LVIDd: 4 65 cm  LVIDs: 3 1 cm  LVLd A4C: 9 42 cm  LVLs A4C: 7 89 cm  LVPWd: 1 03 cm  RA Area: 16 54 cm2  RVIDd: 5 01 cm  SV MOD A4C: 101 71 ml  SV(Teich): 61 94 ml    CW  AR Dec Drew: 3 09 m/s2  AR Dec Time: 1445 59 ms  AR PHT: 419 22 ms  AR Vmax: 4 47 m/s  AR maxP 75 mmHg  AV Env  Ti: 304 47 ms  AV MaxP 7 mmHg  AV VTI: 33 72 cm  AV Vmax: 1 78 m/s  AV Vmean: 1 11 m/s  AV meanP 58 mmHg  MR VTI: 204 77 cm  MR Vmax: 4 56 m/s  MR Vmean: 3 84 m/s  MR maxP 27 mmHg  MR meanP 52 mmHg  TR MaxP 73 mmHg  TR Vmax: 3 96 m/s    PW  LVOT Env  Ti: 374 88 ms  LVOT VTI: 24 53 cm  LVOT Vmax: 1 04 m/s  LVOT Vmean: 0 65 m/s  LVOT maxP 42 mmHg  LVOT meanP 05 mmHg  MV A Adithya: 0 16 m/s  MV Dec Drew: 2 45 m/s2  MV DecT: 254 72 ms  MV E Adithya: 0 62 m/s  MV E/A Ratio: 3 99  MV PHT: 73 87 ms  MVA By PHT: 2 98 cm2    [employer] Accredited Echocardiography Laboratory    Prepared and electronically signed by    Brooke Charles DO  Signed 15-Som-2021 12:34:36    Results for orders placed during the hospital encounter of 23    Echo complete w/ contrast if indicated    Interpretation Summary  •  Left Ventricle: Left ventricular cavity size is normal  Wall thickness is moderately increased  The left ventricular ejection fraction is 40% by visual estimation  Systolic function is mildly reduced  Unable to assess diastolic function due to frequent ventricular ectopy  The patient went into torsades in the middle of the echocardiogram which was sustained, ultimately spontaneously converted into a idioventricular rhythm, then back to sinus rhythm  •  The following segments are akinetic: apical inferior and apex  •  The following segments are hypokinetic: basal inferoseptal, mid anteroseptal, mid inferoseptal, mid inferior, apical anterior, apical septal and apical lateral   •  All other segments are normal   •  Right Ventricle: Right ventricular cavity size is moderately dilated  •  Left Atrium: The atrium is severely dilated  •  Right Atrium: The atrium is mildly dilated  •  Aortic Valve: There is mild regurgitation  •  Tricuspid Valve: There is moderate regurgitation  The right ventricular systolic pressure is severely elevated  The estimated right ventricular systolic pressure is 94 80 mmHg  •  Pulmonic Valve: There is mild regurgitation  •  Aorta: The aortic root is normal in size  The ascending aorta is mildly dilated  The ascending aorta is 4 1 cm  •  IVC/SVC: The inferior vena cava size is 3 7 mm  The right atrial pressure is estimated at 15 0 mmHg  The inferior vena cava is dilated  Respirophasic changes were blunted (less than 50% variation)  Anticoagulation VTE Prophylaxis: Heparin     Counseling / Coordination of Care  Total floor / unit time spent today 1 hour minutes  Greater than 50% of total time was spent with the patient and / or family counseling and / or coordination of care  A description of the counseling / coordination of care:       Epic/ Allscripts/Care Everywhere records reviewed:     ** Please Note: Fluency DirectDictation voice to text software may have been used in the creation of this document   **

## 2023-03-08 NOTE — DISCHARGE INSTR - AVS FIRST PAGE
Please refer to post ICD implantation discharge instructions and restrictions below and your ICD booklet/temporary card  Keep incision dry for one week  Do not use lotions/powders/creams on incision  Leave outer bandage in place for 1 week - it is water proof, and as long as it is fully adhered to your skin you may shower with it  If it appears as though the bandage is coming off and/or there is any communication to the area of device incision, please then keep the whole area dry for the remaining week  After 1 week, please remove by pulling all edges away from the center of the bandage  No overhead reaching/pushing/pulling/lifting greater than 5-10lbs with left arm for six weeks  Please call the office if you notice redness, swelling, bleeding, or drainage from incision or if you develop fevers    Implantable Cardioverter Defibrillator      If you have any questions, please call [phone] to speak with a nurse (8:30am-4pm, or [phone] after hours)  For appointments, please call [phone]  WHAT YOU SHOULD KNOW:    An implantable cardioverter defibrillator (ICD) is a small device that monitors your heart rate and rhythm  It is commonly placed inside your upper chest region  It may be used if you have a ventricular arrhythmia, which is an irregular, dangerous rhythm from the bottom chamber of your heart  Some arrhythmias may cause your heart to suddenly stop beating  An ICD can give a shock to your heart to make it start beating again, or it can give pacing therapy (also known as pain-free therapy) to return your heart to normal rhythm  It is also a pacemaker, so it will pace your heart if needed to prevent it from beating too slowly  AFTER YOU LEAVE:      Follow up with your primary healthcare provider or cardiologist as directed: You will need to follow-up to have your ICD checked and make sure you are not having problems   Write down your questions so you remember to ask them during your visits  Driving: you are ok to drive 48 hours after device is implanted     Self-care:   Ask about activity: Ask if you need to avoid moving your shoulder or arm, and for how long  Ask if you should avoid lifting heavy objects  Do not play any contact sports, such as football or wrestling, until your primary healthcare provider [facility] or cardiologist tells you it is okay  You may only be able to drive for a certain amount of time per day, or during certain hours  Ask when you can return to work  Care for your skin over the ICD: see instructions above     When you get a shock from your ICD: A shock may feel like someone has hit you, or you may feel a thump in the chest  If someone is touching you when you get a shock, they may feel a tingling feeling  The first time you feel a shock, it may scare you  Sit or lie down and stay calm  Ask someone to stay with you if possible  Please either call your cardiologist or report to an emergency room  Safety instructions when you have an ICD:   Carry an ID card for the ICD: This card has important information about your ICD  Stay away from magnets or machines with electric fields: This includes MRI machines  Avoid leaning into a car engine or doing welding  These things can interfere with how your ICD works  Tell airport security you have an ICD: You may need to be searched by hand when you go through a security gate  The security gate or handheld wand could harm your ICD  Keep an ICD diary: Record when you get a shock and what you were doing before you got the shock  Keep track of how you felt before and after the shock, as well as how many shocks you received  Write down the day and time of each shock  Bring the diary with you when you see your PHP or cardiologist    Rishi Reyes medical alert identification: Wear medical alert jewelry or carry a card that says you have an ICD  Ask your PHP or cardiologist where to get these items      Contact your primary healthcare provider or cardiologist if:   You have a fever  You feel 1 or more shocks from your ICD and feel fine afterwards  Your feet or ankles swell  The area around your ICD is painful or tender after surgery  The skin around your stitches or staples is red, swollen, or draining pus or fluid  You have chills, a cough, and feel weak or achy  You are sad or anxious and find it hard to do your usual activities  You have questions or concerns about your condition or care  Seek care immediately or call 911 if:   Your stitches or staples come apart  Blood soaks through your bandage  You feel more than 3 shocks in a row from your ICD  You become weak, dizzy, or faint  You feel your heart skip beats or beat very fast or slow, but you do not feel a shock from your ICD  You have chest pain that does not go away with rest or medicine  © 2014 [employer] is for End User's use only and may not be sold, redistributed or otherwise used for commercial purposes  All illustrations and images included in CareNotes® are the copyrighted property of [employer]  or Maycol Vaughn  The above information is an  only  It is not intended as medical advice for individual conditions or treatments  Talk to your doctor, nurse or pharmacist before following any medical regimen to see if it is safe and effective for you

## 2023-03-08 NOTE — PLAN OF CARE
Problem: SAFETY,RESTRAINT: NV/NON-SELF DESTRUCTIVE BEHAVIOR  Goal: Remains free of harm/injury (restraint for non violent/non self-detsructive behavior)  Description: INTERVENTIONS:  - Instruct patient/family regarding restraint use   - Assess and monitor physiologic and psychological status   - Provide interventions and comfort measures to meet assessed patient needs   - Identify and implement measures to help patient regain control  - Assess readiness for release of restraint   Outcome: Progressing     Problem: MOBILITY - ADULT  Goal: Maintain or return to baseline ADL function  Description: INTERVENTIONS:  -  Assess patient's ability to carry out ADLs; assess patient's baseline for ADL function and identify physical deficits which impact ability to perform ADLs (bathing, care of mouth/teeth, toileting, grooming, dressing, etc )  - Assess/evaluate cause of self-care deficits   - Assess range of motion  - Assess patient's mobility; develop plan if impaired  - Assess patient's need for assistive devices and provide as appropriate  - Encourage maximum independence but intervene and supervise when necessary  - Involve family in performance of ADLs  - Assess for home care needs following discharge   - Consider OT consult to assist with ADL evaluation and planning for discharge  - Provide patient education as appropriate  Outcome: Progressing     Problem: Prexisting or High Potential for Compromised Skin Integrity  Goal: Skin integrity is maintained or improved  Description: INTERVENTIONS:  - Identify patients at risk for skin breakdown  - Assess and monitor skin integrity  - Assess and monitor nutrition and hydration status  - Monitor labs   - Assess for incontinence   - Turn and reposition patient  - Assist with mobility/ambulation  - Relieve pressure over bony prominences  - Avoid friction and shearing  - Provide appropriate hygiene as needed including keeping skin clean and dry  - Evaluate need for skin moisturizer/barrier cream  - Collaborate with interdisciplinary team   - Patient/family teaching  - Consider wound care consult   Outcome: Progressing

## 2023-03-09 ENCOUNTER — APPOINTMENT (OUTPATIENT)
Dept: RADIOLOGY | Facility: HOSPITAL | Age: 73
End: 2023-03-09

## 2023-03-09 ENCOUNTER — APPOINTMENT (INPATIENT)
Dept: NON INVASIVE DIAGNOSTICS | Facility: HOSPITAL | Age: 73
End: 2023-03-09

## 2023-03-09 LAB
ANION GAP SERPL CALCULATED.3IONS-SCNC: 6 MMOL/L (ref 4–13)
AORTIC ROOT: 3.2 CM
AORTIC VALVE MEAN VELOCITY: 7.3 M/S
APICAL FOUR CHAMBER EJECTION FRACTION: 42 %
ASCENDING AORTA: 4.2 CM
ATRIAL RATE: 119 BPM
ATRIAL RATE: 120 BPM
ATRIAL RATE: 120 BPM
AV LVOT MEAN GRADIENT: 2 MMHG
AV LVOT PEAK GRADIENT: 3 MMHG
AV MEAN GRADIENT: 2 MMHG
AV PEAK GRADIENT: 5 MMHG
AV VELOCITY RATIO: 0.81
BUN SERPL-MCNC: 14 MG/DL (ref 5–25)
CA-I BLD-SCNC: 1.1 MMOL/L (ref 1.12–1.32)
CALCIUM SERPL-MCNC: 8.4 MG/DL (ref 8.3–10.1)
CHLORIDE SERPL-SCNC: 104 MMOL/L (ref 96–108)
CO2 SERPL-SCNC: 22 MMOL/L (ref 21–32)
CREAT SERPL-MCNC: 0.71 MG/DL (ref 0.6–1.3)
DOP CALC AO PEAK VEL: 1.07 M/S
DOP CALC AO VTI: 14.98 CM
DOP CALC LVOT PEAK VEL VTI: 13.16 CM
DOP CALC LVOT PEAK VEL: 0.87 M/S
E WAVE DECELERATION TIME: 133 MS
ERYTHROCYTE [DISTWIDTH] IN BLOOD BY AUTOMATED COUNT: 14.5 % (ref 11.6–15.1)
GFR SERPL CREATININE-BSD FRML MDRD: 95 ML/MIN/1.73SQ M
GLUCOSE SERPL-MCNC: 117 MG/DL (ref 65–140)
HCT VFR BLD AUTO: 28.7 % (ref 36.5–49.3)
HGB BLD-MCNC: 9.5 G/DL (ref 12–17)
IVC: 34 MM
MAGNESIUM SERPL-MCNC: 2.4 MG/DL (ref 1.6–2.6)
MCH RBC QN AUTO: 36.1 PG (ref 26.8–34.3)
MCHC RBC AUTO-ENTMCNC: 33.1 G/DL (ref 31.4–37.4)
MCV RBC AUTO: 109 FL (ref 82–98)
MV E'TISSUE VEL-SEP: 4 CM/S
MV PEAK A VEL: 0.59 M/S
MV PEAK E VEL: 62 CM/S
MV STENOSIS PRESSURE HALF TIME: 39 MS
MV VALVE AREA P 1/2 METHOD: 5.64 CM2
PHOSPHATE SERPL-MCNC: 3.1 MG/DL (ref 2.3–4.1)
PLATELET # BLD AUTO: 168 THOUSANDS/UL (ref 149–390)
PMV BLD AUTO: 10 FL (ref 8.9–12.7)
POTASSIUM SERPL-SCNC: 4.4 MMOL/L (ref 3.5–5.3)
PR INTERVAL: 0 MS
QRS AXIS: -45 DEGREES
QRS AXIS: -62 DEGREES
QRS AXIS: -89 DEGREES
QRSD INTERVAL: 142 MS
QRSD INTERVAL: 146 MS
QRSD INTERVAL: 158 MS
QT INTERVAL: 400 MS
QT INTERVAL: 425 MS
QT INTERVAL: 442 MS
QTC INTERVAL: 566 MS
QTC INTERVAL: 601 MS
QTC INTERVAL: 622 MS
RA PRESSURE ESTIMATED: 20 MMHG
RBC # BLD AUTO: 2.63 MILLION/UL (ref 3.88–5.62)
RV PSP: 56 MMHG
SL CV LV EF: 30
SODIUM SERPL-SCNC: 132 MMOL/L (ref 135–147)
T WAVE AXIS: 103 DEGREES
T WAVE AXIS: 133 DEGREES
T WAVE AXIS: 158 DEGREES
TR MAX PG: 36 MMHG
TR PEAK VELOCITY: 3 M/S
TRICUSPID ANNULAR PLANE SYSTOLIC EXCURSION: 1.8 CM
TRICUSPID VALVE PEAK REGURGITATION VELOCITY: 3.61 M/S
VENTRICULAR RATE: 119 BPM
VENTRICULAR RATE: 120 BPM
VENTRICULAR RATE: 120 BPM
WBC # BLD AUTO: 33.53 THOUSAND/UL (ref 4.31–10.16)

## 2023-03-09 RX ORDER — LORAZEPAM 0.5 MG/1
0.5 TABLET ORAL ONCE
Status: DISCONTINUED | OUTPATIENT
Start: 2023-03-09 | End: 2023-03-11

## 2023-03-09 RX ORDER — LORAZEPAM 0.5 MG/1
0.5 TABLET ORAL EVERY 8 HOURS PRN
Status: DISCONTINUED | OUTPATIENT
Start: 2023-03-09 | End: 2023-03-09

## 2023-03-09 RX ORDER — AMOXICILLIN 250 MG
2 CAPSULE ORAL 2 TIMES DAILY
Status: DISCONTINUED | OUTPATIENT
Start: 2023-03-09 | End: 2023-03-13 | Stop reason: HOSPADM

## 2023-03-09 RX ORDER — POLYETHYLENE GLYCOL 3350 17 G/17G
17 POWDER, FOR SOLUTION ORAL DAILY
Status: DISCONTINUED | OUTPATIENT
Start: 2023-03-10 | End: 2023-03-09

## 2023-03-09 RX ORDER — POLYETHYLENE GLYCOL 3350 17 G/17G
17 POWDER, FOR SOLUTION ORAL 2 TIMES DAILY
Status: DISCONTINUED | OUTPATIENT
Start: 2023-03-09 | End: 2023-03-13 | Stop reason: HOSPADM

## 2023-03-09 RX ORDER — AMOXICILLIN 250 MG
1 CAPSULE ORAL
Status: DISCONTINUED | OUTPATIENT
Start: 2023-03-09 | End: 2023-03-09

## 2023-03-09 RX ORDER — LORAZEPAM 1 MG/1
1 TABLET ORAL EVERY 8 HOURS PRN
Status: DISCONTINUED | OUTPATIENT
Start: 2023-03-09 | End: 2023-03-10

## 2023-03-09 RX ORDER — POLYETHYLENE GLYCOL 3350 17 G/17G
17 POWDER, FOR SOLUTION ORAL DAILY PRN
Status: DISCONTINUED | OUTPATIENT
Start: 2023-03-09 | End: 2023-03-11

## 2023-03-09 RX ADMIN — GABAPENTIN 100 MG: 100 CAPSULE ORAL at 21:12

## 2023-03-09 RX ADMIN — MONTELUKAST 10 MG: 10 TABLET, FILM COATED ORAL at 21:12

## 2023-03-09 RX ADMIN — ASPIRIN 81 MG: 81 TABLET, CHEWABLE ORAL at 09:29

## 2023-03-09 RX ADMIN — HYDROMORPHONE HYDROCHLORIDE 0.2 MG: 0.2 INJECTION, SOLUTION INTRAMUSCULAR; INTRAVENOUS; SUBCUTANEOUS at 16:02

## 2023-03-09 RX ADMIN — ENOXAPARIN SODIUM 40 MG: 40 INJECTION SUBCUTANEOUS at 09:29

## 2023-03-09 RX ADMIN — NADOLOL 80 MG: 40 TABLET ORAL at 05:23

## 2023-03-09 RX ADMIN — LORAZEPAM 0.5 MG: 0.5 TABLET ORAL at 03:09

## 2023-03-09 RX ADMIN — SENNOSIDES AND DOCUSATE SODIUM 2 TABLET: 8.6; 5 TABLET ORAL at 17:42

## 2023-03-09 RX ADMIN — LORAZEPAM 0.5 MG: 0.5 TABLET ORAL at 11:35

## 2023-03-09 RX ADMIN — FOLIC ACID 1 MG: 1 TABLET ORAL at 09:29

## 2023-03-09 RX ADMIN — PANTOPRAZOLE SODIUM 40 MG: 40 TABLET, DELAYED RELEASE ORAL at 05:23

## 2023-03-09 RX ADMIN — MELATONIN 6 MG: at 21:12

## 2023-03-09 RX ADMIN — LORAZEPAM 1 MG: 1 TABLET ORAL at 21:12

## 2023-03-09 RX ADMIN — POLYETHYLENE GLYCOL 3350 17 G: 17 POWDER, FOR SOLUTION ORAL at 15:00

## 2023-03-09 RX ADMIN — SENNOSIDES AND DOCUSATE SODIUM 1 TABLET: 8.6; 5 TABLET ORAL at 11:35

## 2023-03-09 RX ADMIN — PERFLUTREN 0.8 ML/MIN: 6.52 INJECTION, SUSPENSION INTRAVENOUS at 13:50

## 2023-03-09 RX ADMIN — Medication 1 TABLET: at 09:29

## 2023-03-09 RX ADMIN — POLYETHYLENE GLYCOL 3350 17 G: 17 POWDER, FOR SOLUTION ORAL at 21:12

## 2023-03-09 RX ADMIN — THIAMINE HCL TAB 100 MG 100 MG: 100 TAB at 09:29

## 2023-03-09 RX ADMIN — OXYCODONE HYDROCHLORIDE 5 MG: 5 SOLUTION ORAL at 21:56

## 2023-03-09 RX ADMIN — OXYCODONE HYDROCHLORIDE 5 MG: 5 SOLUTION ORAL at 11:14

## 2023-03-09 RX ADMIN — LORAZEPAM 0.5 MG: 0.5 TABLET ORAL at 11:14

## 2023-03-09 RX ADMIN — HYDROMORPHONE HYDROCHLORIDE 0.2 MG: 0.2 INJECTION, SOLUTION INTRAMUSCULAR; INTRAVENOUS; SUBCUTANEOUS at 07:30

## 2023-03-09 RX ADMIN — ATORVASTATIN CALCIUM 20 MG: 20 TABLET, FILM COATED ORAL at 16:02

## 2023-03-09 NOTE — PROGRESS NOTES
Daily Progress Note - Critical Care   Shoaib Oviedo 81 y o  male MRN: [id number]  Unit/Bed#: MICU 05 Encounter: [id number]        ----------------------------------------------------------------------------------------  HPI/24hr events: Salazar Wren a 81 y  o  male who presents with PMH of paroxysmal afib, HTN, long QT, alcohol use, marijuana use, dementia and CLL  Pt initially presented to [facility] ED 3/6 for loss of consciousness  Per the pt's wife, they were watching a movie when the pt told his wife that he was not feeling well and immediately lost consciousness  EMS arrived and found the patient to be unresponsive  The pt was found to be in V-fib and patient was shocked x1 and underwent CPR  ROSC achieved in the field  Upon arrival to the ED, the pt was found to be breathing spontaneously, alert, awake, but lethargic  Pt had similar episodes last year with concern of possible seizure activity  EKG showed A-fib with RVR  Pt denies chest pain, fevers, chills, nausea, or vomiting at this time       Cardiology was consulted and reviewed the ECG which revealed a regular narrow complex tachycardia suggesting SVT  Pt was then given adenosine  Pt was found to have elevated troponin, a low grade fever and elevated WBC showing evidence of sepsis  Urinalysis showed WBC and innumerable bacteria  Pt was started on ceftriaxone  Neurology was consulted for evaluation of his loss of consciousness and planned for CTA head/neck to exclude basilar/other critical vessel abnormalities that could lead to LOC/hypoperfusion and syncope  Otherwise neurology workup was unremarkable       Yesterday the pt was receiving an echo when the pt developed v fib arrest and went into torsades with QT of 671 ms  Started on lidocaine drip  Pt came out of arrhythmia prior to defibrillation  The echo showed apex hypokinesis which is new prior to echo   Pt received cardiac cath in [facility], had temporary pacer placed for recurrent torsades and has been transferred to [facility] MICU with plans for ICD placement       Last night pt was disoriented and confused and required precedex to control his agitation and dementia  Cardiology is planning for ICD placement today      ---------------------------------------------------------------------------------------  SUBJECTIVE  Pt seen and examined  Lying in bed, resting comfortably  Pt was not oriented and seemed confused during exam      Review of Systems   Constitutional: Negative for chills, diaphoresis and fever  HENT: Negative for rhinorrhea, sinus pain and sore throat  Eyes: Negative for pain and redness  Respiratory: Negative for cough, chest tightness and shortness of breath  Cardiovascular: Negative for chest pain and leg swelling  Gastrointestinal: Negative for abdominal pain, constipation and diarrhea  Genitourinary: Negative for flank pain and frequency  Skin: Negative for rash  Neurological: Negative for light-headedness and headaches  Psychiatric/Behavioral:        Baseline dementia    All other systems reviewed and are negative  Review of systems was reviewed and negative unless stated above in HPI/24-hour events   ---------------------------------------------------------------------------------------  Assessment and Plan:    Neuro:   • Diagnosis: syncope   o Plan: likely 2/2/ arrhythmia; torsades/VT/afib  o Plan for IDC with EP   o Received ICD  o Currently on lidocaine gtt   • Diagnosis: Hx of dementia   o Plan: avoid QT prolonging meds      CV:   • Diagnosis: Torsades with QT on ECG of 671  o Plan: continue lidocaine drip, avoid amiodarone   o Received ICD today     o Nadolol per card recs   o Avoid QT prolonging meds  • Diagnosis: S/p cardiac cath- no flow significant atherosclerotic lesions were identified; temporary pacing wire placed for recurrent torsade during the porcedure  • Diagnosis: HTN  o Continue home norvasc  • Diagnosis: Paroxysmal afib  o Continue metoprolol per 09-Oct-2018 19:19 cards  • Diagnosis: HLD  o Continue home atorvastatin       Pulm:  • Diagnosis: no acute concerns       GI:   • Diagnosis: GERD   o Plan: continue home protonix     :   • Diagnosis: UTI   o Plan: SIRS criteria, culture >100k GNR  o Completed abx       F/E/N:   • Fluids: no concerns  • Electrolyte: replete PRN   • Nutrition: cardiac diet       Heme/Onc:   • Diagnosis: Hx of CLL  o Plan: managed outpatient   o Baseline WBC of 14k-18k   o 33k today   • Diagnosis: Macrocytic anemia   o Plan: MCV of 109       Endo:   • Diagnosis: no acute concerns      ID:   • Diagnosis: Sepsis   o Plan: leukocytosis, fevers, >100k e coli in urine  o Finished abx      MSK/Skin:   • Diagnosis: no acute concerns     Patient appropriate for transfer out of the ICU today?: Patient does not meet criteria for ICU Follow-up Clinic; referral has not been made  Disposition: Transfer to Med-Surg   Code Status: Level 1 - Full Code  ---------------------------------------------------------------------------------------  ICU CORE MEASURES    Prophylaxis   VTE Pharmacologic Prophylaxis: Enoxaparin (Lovenox)  VTE Mechanical Prophylaxis: sequential compression device  Stress Ulcer Prophylaxis: Pantoprazole PO    ABCDE Protocol (if indicated)  Plan to perform spontaneous awakening trial today? Not applicable  Plan to perform spontaneous breathing trial today? Not applicable  Obvious barriers to extubation? Not applicable  CAM-ICU: Negative    Invasive Devices Review  Invasive Devices     Peripheral Intravenous Line  Duration           Peripheral IV 03/06/23 Dorsal (posterior); Left Hand 3 days    Peripheral IV 03/06/23 Right Antecubital 3 days    Long-Dwell Peripheral IV (Midline) 03/07/23 Left 1 day          Drain  Duration           Urethral Catheter Temperature probe 16 Fr  2 days              Can any invasive devices be discontinued today? No  ---------------------------------------------------------------------------------------  OBJECTIVE    Vitals   Vitals:    23 0315 23 0415 23 0523 23 0805   BP: 112/76 119/81 130/91    BP Location:       Pulse: (!) 120 (!) 120 (!) 120    Resp: 22 (!) 23     Temp:  99 7 °F (37 6 °C)  99 3 °F (37 4 °C)   TempSrc:       SpO2: 97% 97%     Weight:       Height:         Temp (24hrs), Av 2 °F (37 3 °C), Min:97 9 °F (36 6 °C), Max:99 7 °F (37 6 °C)  Current: Temperature: 99 3 °F (37 4 °C)  HR: 120  BP: 112/76  RR: 22  SpO2: 97    Respiratory:  SpO2: SpO2: 97 %, SpO2 Activity: SpO2 Activity: At Rest, SpO2 Device: O2 Device: Nasal cannula, Capnography:    Nasal Cannula O2 Flow Rate (L/min): 2 L/min    Invasive/non-invasive ventilation settings   Respiratory    Lab Data (Last 4 hours)    None         O2/Vent Data (Last 4 hours)    None                Physical Exam          Laboratory and Diagnostics:  Results from last 7 days   Lab Units 23  0605 23  1357   WBC Thousand/uL 33 53* 15 53* 15 23* 26 79* 49 68*   HEMOGLOBIN g/dL 9 5* 9 3* 9 1* 10 8* 13 2   HEMATOCRIT % 28 7* 27 9* 27 4* 31 2* 38 6   PLATELETS Thousands/uL 168 116* 123* 163 228   BANDS PCT %  --   --   --   --  8   MONO PCT %  --   --   --   --  0*     Results from last 7 days   Lab Units 23  0523  0605 23  1357   SODIUM mmol/L 132* 133* 133* 130* 133*   POTASSIUM mmol/L 4 4 3 5 3 6 4 0 4 1   CHLORIDE mmol/L 104 102 101 95* 98   CO2 mmol/L 22 26 26 24 20*   ANION GAP mmol/L 6 5 6 11 15*   BUN mg/dL 14 13 12 14 10   CREATININE mg/dL 0 71 0 67 0 71 0 91 0 96   CALCIUM mg/dL 8 4 8 0* 7 7* 8 4 9 4   GLUCOSE RANDOM mg/dL 117 115 131 135 234*   ALT U/L  --   --   --  30 39   AST U/L  --   --   --  31 49*   ALK PHOS U/L  --   --   --  66 88   ALBUMIN g/dL  --   --   --  4 4 4 9   TOTAL BILIRUBIN mg/dL  --   --   --  0 92 1 01* Results from last 7 days   Lab Units 03/09/23  0516 03/08/23  0427 03/07/23  2326 03/07/23  0605 03/06/23  1357   MAGNESIUM mg/dL 2 4 2 6 2 5 2 2 1 9   PHOSPHORUS mg/dL 3 1  --   --   --  5 1*      Results from last 7 days   Lab Units 03/06/23  1357   INR  1 25*   PTT seconds 26          Results from last 7 days   Lab Units 03/06/23  1541 03/06/23  1357   LACTIC ACID mmol/L 3 5* 5 4*     ABG:    VBG:    Results from last 7 days   Lab Units 03/06/23  1357   PROCALCITONIN ng/ml 0 05       Micro  Results from last 7 days   Lab Units 03/06/23  1424 03/06/23  1400 03/06/23  1357   BLOOD CULTURE   --  No Growth at 48 hrs  No Growth at 48 hrs  URINE CULTURE  >100,000 cfu/ml Escherichia coli*  --   --        EKG: 3/9 EKG shows ventricular pacement   Imaging: 3/9 chest XR not yet read  I have personally reviewed pertinent reports  and I have personally reviewed pertinent films in PACS    Intake and Output  I/O       03/07 0701 03/08 0700 03/08 0701  03/09 0700 03/09 0701  03/10 0700    P  O    340    I V  (mL/kg) 1321 7 (14 6) 1096 5 (12 1)     IV Piggyback 100 100     Total Intake(mL/kg) 1421 7 (15 7) 1196 5 (13 2) 340 (3 8)    Urine (mL/kg/hr) 445 430 (0 2)     Blood  30     Total Output 445 460     Net +976 7 +736 5 +340               UOP: 460 ml    Height and Weights   Height: 5' 8" (172 7 cm)  IBW (Ideal Body Weight): 68 4 kg  Body mass index is 30 34 kg/m²  Weight (last 2 days)     Date/Time Weight    03/08/23 0547 90 5 (199 52)    03/07/23 1811 86 2 (190 04)            Nutrition       Diet Orders   (From admission, onward)             Start     Ordered    03/08/23 2050  Diet Cardiovascular; Cardiac  Diet effective midnight        References:    Nutrtion Support Algorithm Enteral vs  Parenteral   Question Answer Comment   Diet Type Cardiovascular    Cardiac Cardiac    RD to adjust diet per protocol?  Yes        03/08/23 2049                  Active Medications  Scheduled Meds:  Current Facility-Administered Medications   Medication Dose Route Frequency Provider Last Rate   • acetaminophen  650 mg Oral Q6H PRN Barnie Dominion Sonday, CRNP     • aspirin  81 mg Oral Daily Barnie Dominion Sonday, CRNP     • atorvastatin  20 mg Oral Daily With [address] , [address] Sonday, CRNP     • dexmedetomidine  0 1-1 3 mcg/kg/hr Intravenous Titrated Dolores Page,  Stopped (03/08/23 1712)   • enoxaparin  40 mg Subcutaneous Daily Jamalnie Dominion Sonday, CRNP     • folic acid  1 mg Oral Daily Barnie Dominion Sonday, CRNP     • gabapentin  100 mg Oral HS Kwame Paci, DO     • HYDROmorphone  0 2 mg Intravenous Q4H PRN Kwame Paci, DO     • lidocaine  1 patch Topical Daily Kwame Paci, DO     • lidocaine in dextrose 5%  1 mg/min Intravenous Continuous Barnie Dominion Sonday, CRNP 1 mg/min (03/09/23 0701)   • LORazepam  0 5 mg Oral Q8H PRN Dolores Page,      • melatonin  6 mg Oral HS Jamalnie Dominion Sonday, CRNP     • montelukast  10 mg Oral HS Jamalnie Dominion Sonday, CRNP     • multivitamin-minerals  1 tablet Oral Daily Yanicke Dominion Sonday, CRNP     • nadolol  80 mg Oral Early Morning Inessa Mcghee MD     • oxyCODONE  2 5 mg Oral Q4H PRN Kwame Paci, DO     • oxyCODONE  5 mg Oral Q4H PRN Kwame Paci, DO     • pantoprazole  40 mg Oral Early Morning Barnie Dominion Sonday, CRNP     • thiamine  100 mg Oral Daily Barnie Dominion Sonday, CRNP       Continuous Infusions:  dexmedetomidine, 0 1-1 3 mcg/kg/hr, Last Rate: Stopped (03/08/23 1712)  lidocaine in dextrose 5%, 1 mg/min, Last Rate: 1 mg/min (03/09/23 0701)      PRN Meds:   acetaminophen, 650 mg, Q6H PRN  HYDROmorphone, 0 2 mg, Q4H PRN  LORazepam, 0 5 mg, Q8H PRN  oxyCODONE, 2 5 mg, Q4H PRN  oxyCODONE, 5 mg, Q4H PRN        Allergies   Allergies   Allergen Reactions   • Codeine Other (See Comments)     agitated   • Sulfamethoxazole-Trimethoprim GI Intolerance and Abdominal Pain     upset stomach ---------------------------------------------------------------------------------------  Advance Directive and Living Will: Yes    Power of :    POLST:    ---------------------------------------------------------------------------------------  Care Time Delivered:   45 min    Sejal Kruse      Portions of the record may have been created with voice recognition software  Occasional wrong word or "sound a like" substitutions may have occurred due to the inherent limitations of voice recognition software    Read the chart carefully and recognize, using context, where substitutions have occurred

## 2023-03-09 NOTE — ED ATTENDING ATTESTATION
3/6/2023  ITigre MD, saw and evaluated the patient  I have discussed the patient with the resident/non-physician practitioner and agree with the resident's/non-physician practitioner's findings, Plan of Care, and MDM as documented in the resident's/non-physician practitioner's note, except where noted  All available labs and Radiology studies were reviewed  I was present for key portions of any procedure(s) performed by the resident/non-physician practitioner and I was immediately available to provide assistance  At this point I agree with the current assessment done in the Emergency Department  I have conducted an independent evaluation of this patient a history and physical is as follows:    ED Course         Critical Care Time  30 min  Procedures    Paramedics got a call for unresponsiveness , possible seizure originally, when they got there , he was unresponsive and they put the pads on him , they found him to be in vfib and shocked him once  He had chest compressions, with ROSC  Pt  Was awake and alert (slightly confused) when he arrived  80 y/o male with a hx of alcohol abuse  Last time he had seizure-like activity, he had a uti    PERRL, RRR, CTA b/l, abd   Nontender ,ext, no edema , neuro - moves all ext  , AAOx2

## 2023-03-09 NOTE — PROGRESS NOTES
Pastoral Care Progress Note    3/9/2023  Patient: Darvin Huertas : 1950  Admission Date & Time: 3/7/2023 1753  MRN: [id number] [facility]: [id number]          Pt a little snippy/impatient today with his wife, admitted that he's not quite himself  I reminded him that surgery/medications contribute to that, be gentle and patient with himself and others  Spend some time gently talking with him, prayed for both he and his wife   remains available

## 2023-03-09 NOTE — PROGRESS NOTES
Progress Note - Electrophysiology  Suyapa Mercado 81 y o  male MRN: [id number]  Unit/Bed#: MICU 05 Encounter: [id number]      Assessment:  1  [employer] "biventricular" ICD in situ  - underwent temp wire to permanent device 3/8  - was to undergo dual chamber device but developed complete heart block intraop  - LV lead attempted to be placed but no good targets  - LPF lead placed with narrow QRS complex, appearing wider today  2  Long QT II Syndrome  - family history of this (grandson diagnosed with LQT2 at [address]), has reported history of seizure disorder  - outpatient beta blocker of metoprolol changed to nadolol  3  Cardiac arrest x2    - out of hospital cardiac arrest (EMS saw VF and was shock with CPR)   - had further VF while having echo done  - likely precipitated by QT prolonging SSRI and antibiotics  - quieted down with lido drip and temp wire to overdrive pace  - EF of 65% per echo this admission  - no obstructive CAD seen on cath this admission  - now underwent ICD implantation for secondary prevention  4  Reported history of paroxysmal atrial fibrillation  5  Essential hypertension  6  Confusion   - had required Precedex yesterday - agitation and not adhering to bedrest given temp wire  - somewhat due to reported dementia, also likely due to anoxic insults from #3  7  CLL  8  Alcohol and marijuana use  9  UTI, being treated    Plan:  1  Device - Patient is doing well status post ICD implantation  His incision is clean, dry, and intact without evidence of hematoma or ecchymosis or signs of infection  Vital signs and labs were reviewed  Assessment of chest x-rays show proper lead placement without evidence of pneumothorax  Device interrogation showed appropriate device function with lead parameters such as sensing, threshold, and impedance being tested  Patient's low rate was sent to either 60 or 70 but he is sustaining rates around 120 (flat line on telemetry graphic trend) with ventricular pacing   He is likely in heart block still resulting in this ventricular pacing  Will change to MVP to see if his intrinsic does come back on its own  Given tachycardia, did order echo that did not show effusion  Will discuss takutsubo appearance with attending  2  Post care - Discharge instructions and restrictions were discussed with the patient in detail  He will also be provided with written instructions detailing what was discussed  Patient also requires a 2 week device and site check which has already been arranged and is in Epic  3  Medications - In terms of medications, lidocaine drip has been stopped, he is off precedex as well  He is tolerating nadolol 80mg daily and although still tachycardic, may be due to stress of last day and so will allow and not up-titrate beta blocker just yet  Subjective/Objective   Subjective: Vitor Almonte is a 81y o  year old male with past medical history as stated above  He is hospital stay day 4 and is status post permanent pacemaker implantation  He reports no issues overnight, denies chest pain, shortness of breath, palpitations, lightheadedness or dizziness  Still some confusion and soreness at the incision site  Telemetry shows ventricular pacing at 120 beats per minute      Objective:  Vitals: /84   Pulse (!) 120   Temp 99 3 °F (37 4 °C)   Resp (!) 25   Ht 5' 8" (1 727 m)   Wt 90 5 kg (199 lb 8 3 oz)   SpO2 98%   BMI 30 34 kg/m²     Vitals:    03/07/23 1811 03/08/23 0547   Weight: 86 2 kg (190 lb 0 6 oz) 90 5 kg (199 lb 8 3 oz)     Orthostatic Blood Pressures    Flowsheet Row Most Recent Value   Blood Pressure 121/84 filed at 03/09/2023 1200   Patient Position - Orthostatic VS Lying filed at 03/08/2023 0400            Intake/Output Summary (Last 24 hours) at 3/9/2023 1354  Last data filed at 3/9/2023 9748  Gross per 24 hour   Intake 1472 05 ml   Output 415 ml   Net 1057 05 ml       Invasive Devices     Peripheral Intravenous Line  Duration Peripheral IV 03/06/23 Dorsal (posterior); Left Hand 3 days    Peripheral IV 03/06/23 Right Antecubital 3 days    Long-Dwell Peripheral IV (Midline) 03/07/23 Left 1 day          Drain  Duration           Urethral Catheter Temperature probe 16 Fr  2 days                          Scheduled Meds:  Current Facility-Administered Medications   Medication Dose Route Frequency Provider Last Rate   • acetaminophen  650 mg Oral Q6H PRN Barnie Dominion Sonday, CRNP     • aspirin  81 mg Oral Daily Barnie Dominion Sonday, CRNP     • atorvastatin  20 mg Oral Daily With [address] , [address] Sonday, CRNP     • enoxaparin  40 mg Subcutaneous Daily Barnie Dominion Sonday, CRNP     • folic acid  1 mg Oral Daily Barnie Dominion Sonday, CRNP     • gabapentin  100 mg Oral HS Kwame Paci, DO     • HYDROmorphone  0 2 mg Intravenous Q4H PRN Kwame Paci, DO     • lidocaine  1 patch Topical Daily Kwame Paci, DO     • LORazepam  0 5 mg Oral Q8H PRN Dolores Page, DO     • LORazepam  0 5 mg Oral Once Cindy Greer MD     • melatonin  6 mg Oral HS Jamalnie Dominion Sonday, CRNP     • montelukast  10 mg Oral HS Jamalnie Dominion Sonday, CRNP     • multivitamin-minerals  1 tablet Oral Daily Yanicke Dominion Sonday, CRNP     • nadolol  80 mg Oral Early Morning Inessa Mcghee MD     • oxyCODONE  2 5 mg Oral Q4H PRN Kwame Paci, DO     • oxyCODONE  5 mg Oral Q4H PRN Kwame Paci, DO     • pantoprazole  40 mg Oral Early Morning Jamalnie Dominion Sonday, CRNP     • polyethylene glycol  17 g Oral Daily PRN Cindy Greer MD     • senna-docusate sodium  1 tablet Oral HS Cindy Greer MD     • thiamine  100 mg Oral Daily Barnie Dominion Sonday, CRNP       Continuous Infusions:   PRN Meds: •  acetaminophen  •  HYDROmorphone  •  LORazepam  •  oxyCODONE  •  oxyCODONE  •  polyethylene glycol    Review of Systems:  ROS as noted above, otherwise 12 point review of systems was performed and is negative  Lab Results: I have personally reviewed pertinent lab results  Results from last 7 days   Lab Units 23  0516 23  0427 23  2326   WBC Thousand/uL 33 53* 15 53* 15 23*   HEMOGLOBIN g/dL 9 5* 9 3* 9 1*   HEMATOCRIT % 28 7* 27 9* 27 4*   PLATELETS Thousands/uL 168 116* 123*     Results from last 7 days   Lab Units 23  0516 23  0427 23  2326   POTASSIUM mmol/L 4 4 3 5 3 6   CHLORIDE mmol/L 104 102 101   CO2 mmol/L 22 26 26   BUN mg/dL 14 13 12   CREATININE mg/dL 0 71 0 67 0 71   CALCIUM mg/dL 8 4 8 0* 7 7*     Results from last 7 days   Lab Units 23  1357   INR  1 25*   PTT seconds 26     Results from last 7 days   Lab Units 23  0516 23  0427 23  2326   MAGNESIUM mg/dL 2 4 2 6 2 5       Imaging: I have personally reviewed pertinent reports  Results for orders placed during the hospital encounter of 01/10/21    Echo complete with contrast if indicated    Narrative  [place] [address], [address] [place] [address]  [phone]    Transthoracic Echocardiogram  2D, M-mode, Doppler, and Color Doppler    Study date:  15-Som-2021    Patient: Martín Douglas  MR number: [id number]  Account number: [de-identified]  : 1950  Age: 78 years  Gender: Male  Status: Inpatient  Location: ICU  Height: 66 in  Weight: 195 lb  BP: 149/ 71 mmHg    Indications: New V-Tach, COVID-19  Diagnoses: I47 2 - Ventricular tachycardia    Sonographer:  HARJINDER Vera  Primary Physician:  Xiomara Brooke MD  Referring Physician:  Alexandra Avalos MD  Group:  [facility]  Interpreting Physician:  Fly Ramos DO    SUMMARY    SUMMARY:  Limited study  LEFT VENTRICLE:  Systolic function was normal  Ejection fraction was estimated to be 60 %  Although no diagnostic regional wall motion abnormality was identified, this possibility cannot be completely excluded on the basis of this study      RIGHT VENTRICLE:  The ventricle was mildly to moderately dilated  Systolic function was mildly reduced  Free wall hypokinesis  LEFT ATRIUM:  The atrium was dilated  MITRAL VALVE:  There was mild regurgitation  AORTIC VALVE:  There was mild to moderate regurgitation  TRICUSPID VALVE:  There was moderate regurgitation  Estimated peak PA pressure was 75 mmHg  IVC, HEPATIC VEINS:  The inferior vena cava was dilated, with poor inspiratory collapse, consistent with elevated right atrial pressure  HISTORY: PRIOR HISTORY: COVID-19 +, hypertension, PHTN, Luekemia, Alcohol Abuse, Former Smoker  PROCEDURE: The study was performed in the ICU  This was a routine study  The transthoracic approach was used  The study included complete 2D imaging, M-mode, complete spectral Doppler, and color Doppler  The heart rate was 47 bpm, at the  start of the study  Images were obtained from the parasternal, apical, subcostal, and suprasternal notch acoustic windows  Image quality was adequate  LEFT VENTRICLE: Size was normal  Systolic function was normal  Ejection fraction was estimated to be 60 %  Although no diagnostic regional wall motion abnormality was identified, this possibility cannot be completely excluded on the basis  of this study  RIGHT VENTRICLE: The ventricle was mildly to moderately dilated  Systolic function was mildly reduced  Free wall hypokinesis  Wall thickness was normal     LEFT ATRIUM: The atrium was dilated  RIGHT ATRIUM: Size was normal     MITRAL VALVE: Valve structure was normal  There was normal leaflet separation  DOPPLER: The transmitral velocity was within the normal range  There was no evidence for stenosis  There was mild regurgitation  AORTIC VALVE: The valve was trileaflet  Leaflets exhibited normal thickness and normal cuspal separation  DOPPLER: Transaortic velocity was within the normal range  There was no evidence for stenosis  There was mild to moderate  regurgitation      TRICUSPID VALVE: The valve structure was normal  There was normal leaflet separation  DOPPLER: The transtricuspid velocity was within the normal range  There was no evidence for stenosis  There was moderate regurgitation  Estimated peak PA  pressure was 75 mmHg  PULMONIC VALVE: Not well visualized  PERICARDIUM: There was no pericardial effusion  The pericardium was normal in appearance  AORTA: The root exhibited normal size  SYSTEMIC VEINS: IVC: The inferior vena cava was dilated, with poor inspiratory collapse, consistent with elevated right atrial pressure  SYSTEM MEASUREMENT TABLES    2D  %FS: 33 33 %  Ao Diam: 3 62 cm  EDV(Teich): 99 88 ml  EF(Teich): 62 02 %  ESV(Teich): 37 93 ml  IVSd: 1 15 cm  LA Area: 26 46 cm2  LA Diam: 4 59 cm  LVEDV MOD A4C: 166 8 ml  LVEF MOD A4C: 60 98 %  LVESV MOD A4C: 65 09 ml  LVIDd: 4 65 cm  LVIDs: 3 1 cm  LVLd A4C: 9 42 cm  LVLs A4C: 7 89 cm  LVPWd: 1 03 cm  RA Area: 16 54 cm2  RVIDd: 5 01 cm  SV MOD A4C: 101 71 ml  SV(Teich): 61 94 ml    CW  AR Dec Perkins: 3 09 m/s2  AR Dec Time: 1445 59 ms  AR PHT: 419 22 ms  AR Vmax: 4 47 m/s  AR maxP 75 mmHg  AV Env  Ti: 304 47 ms  AV MaxP 7 mmHg  AV VTI: 33 72 cm  AV Vmax: 1 78 m/s  AV Vmean: 1 11 m/s  AV meanP 58 mmHg  MR VTI: 204 77 cm  MR Vmax: 4 56 m/s  MR Vmean: 3 84 m/s  MR maxP 27 mmHg  MR meanP 52 mmHg  TR MaxP 73 mmHg  TR Vmax: 3 96 m/s    PW  LVOT Env  Ti: 374 88 ms  LVOT VTI: 24 53 cm  LVOT Vmax: 1 04 m/s  LVOT Vmean: 0 65 m/s  LVOT maxP 42 mmHg  LVOT meanP 05 mmHg  MV A Adithya: 0 16 m/s  MV Dec Perkins: 2 45 m/s2  MV DecT: 254 72 ms  MV E Adithya: 0 62 m/s  MV E/A Ratio: 3 99  MV PHT: 73 87 ms  MVA By PHT: 2 98 cm2    [address] Accredited [facility]    Prepared and electronically signed by    Ulysses Pradhan DO  Signed 15-Som-2021 12:34:36      VTE Pharmacologic Prophylaxis: Sequential compression device (Venodyne)   VTE Mechanical Prophylaxis: sequential compression device

## 2023-03-09 NOTE — PLAN OF CARE
Problem: SAFETY,RESTRAINT: NV/NON-SELF DESTRUCTIVE BEHAVIOR  Goal: Remains free of harm/injury (restraint for non violent/non self-detsructive behavior)  Description: INTERVENTIONS:  - Instruct patient/family regarding restraint use   - Assess and monitor physiologic and psychological status   - Provide interventions and comfort measures to meet assessed patient needs   - Identify and implement measures to help patient regain control  - Assess readiness for release of restraint   Outcome: Progressing  Goal: Returns to optimal restraint-free functioning  Description: INTERVENTIONS:  - Assess the patient's behavior and symptoms that indicate continued need for restraint  - Identify and implement measures to help patient regain control  - Assess readiness for release of restraint   Outcome: Progressing     Problem: MOBILITY - ADULT  Goal: Maintain or return to baseline ADL function  Description: INTERVENTIONS:  -  Assess patient's ability to carry out ADLs; assess patient's baseline for ADL function and identify physical deficits which impact ability to perform ADLs (bathing, care of mouth/teeth, toileting, grooming, dressing, etc )  - Assess/evaluate cause of self-care deficits   - Assess range of motion  - Assess patient's mobility; develop plan if impaired  - Assess patient's need for assistive devices and provide as appropriate  - Encourage maximum independence but intervene and supervise when necessary  - Involve family in performance of ADLs  - Assess for home care needs following discharge   - Consider OT consult to assist with ADL evaluation and planning for discharge  - Provide patient education as appropriate  Outcome: Progressing  Goal: Maintains/Returns to pre admission functional level  Description: INTERVENTIONS:  - Perform BMAT or MOVE assessment daily    - Set and communicate daily mobility goal to care team and patient/family/caregiver     - Collaborate with rehabilitation services on mobility goals if consulted  - Perform Range of Motion 12 times a day  - Reposition patient every 2 hours    - Dangle patient 3 times a day  - Stand patient 1 times a day  - Ambulate patient 2 times a day  - Out of bed to chair 3 times a day   - Out of bed for meals 3 times a day  - Out of bed for toileting  - Record patient progress and toleration of activity level   Outcome: Progressing     Problem: Prexisting or High Potential for Compromised Skin Integrity  Goal: Skin integrity is maintained or improved  Description: INTERVENTIONS:  - Identify patients at risk for skin breakdown  - Assess and monitor skin integrity  - Assess and monitor nutrition and hydration status  - Monitor labs   - Assess for incontinence   - Turn and reposition patient  - Assist with mobility/ambulation  - Relieve pressure over bony prominences  - Avoid friction and shearing  - Provide appropriate hygiene as needed including keeping skin clean and dry  - Evaluate need for skin moisturizer/barrier cream  - Collaborate with interdisciplinary team   - Patient/family teaching  - Consider wound care consult   Outcome: Progressing

## 2023-03-10 PROBLEM — R40.20 LOSS OF CONSCIOUSNESS (HCC): Status: ACTIVE | Noted: 2023-03-10

## 2023-03-10 PROBLEM — I45.81 LONG QT SYNDROME TYPE 2: Status: ACTIVE | Noted: 2023-03-10

## 2023-03-10 LAB
ANION GAP SERPL CALCULATED.3IONS-SCNC: 7 MMOL/L (ref 4–13)
ANISOCYTOSIS BLD QL SMEAR: PRESENT
ATRIAL RATE: 69 BPM
BASOPHILS # BLD MANUAL: 0 THOUSAND/UL (ref 0–0.1)
BASOPHILS NFR MAR MANUAL: 0 % (ref 0–1)
BUN SERPL-MCNC: 15 MG/DL (ref 5–25)
CALCIUM SERPL-MCNC: 8.6 MG/DL (ref 8.3–10.1)
CHLORIDE SERPL-SCNC: 100 MMOL/L (ref 96–108)
CO2 SERPL-SCNC: 25 MMOL/L (ref 21–32)
CREAT SERPL-MCNC: 0.81 MG/DL (ref 0.6–1.3)
EOSINOPHIL # BLD MANUAL: 0.29 THOUSAND/UL (ref 0–0.4)
EOSINOPHIL NFR BLD MANUAL: 1 % (ref 0–6)
ERYTHROCYTE [DISTWIDTH] IN BLOOD BY AUTOMATED COUNT: 14.7 % (ref 11.6–15.1)
GFR SERPL CREATININE-BSD FRML MDRD: 90 ML/MIN/1.73SQ M
GLUCOSE SERPL-MCNC: 115 MG/DL (ref 65–140)
HCT VFR BLD AUTO: 28.9 % (ref 36.5–49.3)
HGB BLD-MCNC: 9.4 G/DL (ref 12–17)
LACTATE SERPL-SCNC: 1.8 MMOL/L (ref 0.5–2)
LYMPHOCYTES # BLD AUTO: 23.12 THOUSAND/UL (ref 0.6–4.47)
LYMPHOCYTES # BLD AUTO: 80 % (ref 14–44)
MACROCYTES BLD QL AUTO: PRESENT
MAGNESIUM SERPL-MCNC: 2.4 MG/DL (ref 1.6–2.6)
MCH RBC QN AUTO: 35.2 PG (ref 26.8–34.3)
MCHC RBC AUTO-ENTMCNC: 32.5 G/DL (ref 31.4–37.4)
MCV RBC AUTO: 108 FL (ref 82–98)
MONOCYTES # BLD AUTO: 0.87 THOUSAND/UL (ref 0–1.22)
MONOCYTES NFR BLD: 3 % (ref 4–12)
NEUTROPHILS # BLD MANUAL: 4.62 THOUSAND/UL (ref 1.85–7.62)
NEUTS BAND NFR BLD MANUAL: 1 % (ref 0–8)
NEUTS SEG NFR BLD AUTO: 15 % (ref 43–75)
PHOSPHATE SERPL-MCNC: 2.9 MG/DL (ref 2.3–4.1)
PLATELET # BLD AUTO: 167 THOUSANDS/UL (ref 149–390)
PLATELET BLD QL SMEAR: ADEQUATE
PMV BLD AUTO: 10.4 FL (ref 8.9–12.7)
POLYCHROMASIA BLD QL SMEAR: PRESENT
POTASSIUM SERPL-SCNC: 4 MMOL/L (ref 3.5–5.3)
PR INTERVAL: 0 MS
QRS AXIS: -56 DEGREES
QRSD INTERVAL: 142 MS
QT INTERVAL: 550 MS
QTC INTERVAL: 590 MS
RBC # BLD AUTO: 2.67 MILLION/UL (ref 3.88–5.62)
RBC MORPH BLD: PRESENT
SMUDGE CELLS BLD QL SMEAR: PRESENT
SODIUM SERPL-SCNC: 132 MMOL/L (ref 135–147)
T WAVE AXIS: 196 DEGREES
VENTRICULAR RATE: 69 BPM
WBC # BLD AUTO: 28.9 THOUSAND/UL (ref 4.31–10.16)

## 2023-03-10 RX ORDER — LOSARTAN POTASSIUM 25 MG/1
25 TABLET ORAL DAILY
Status: DISCONTINUED | OUTPATIENT
Start: 2023-03-10 | End: 2023-03-10

## 2023-03-10 RX ORDER — LOSARTAN POTASSIUM 25 MG/1
12.5 TABLET ORAL DAILY
Status: DISCONTINUED | OUTPATIENT
Start: 2023-03-10 | End: 2023-03-11

## 2023-03-10 RX ORDER — LORAZEPAM 0.5 MG/1
0.5 TABLET ORAL EVERY 8 HOURS PRN
Status: DISCONTINUED | OUTPATIENT
Start: 2023-03-10 | End: 2023-03-10

## 2023-03-10 RX ORDER — SIMETHICONE 80 MG
80 TABLET,CHEWABLE ORAL EVERY 6 HOURS PRN
Status: DISCONTINUED | OUTPATIENT
Start: 2023-03-10 | End: 2023-03-13 | Stop reason: HOSPADM

## 2023-03-10 RX ADMIN — MONTELUKAST 10 MG: 10 TABLET, FILM COATED ORAL at 21:11

## 2023-03-10 RX ADMIN — FOLIC ACID 1 MG: 1 TABLET ORAL at 08:50

## 2023-03-10 RX ADMIN — LOSARTAN POTASSIUM 12.5 MG: 25 TABLET, FILM COATED ORAL at 14:29

## 2023-03-10 RX ADMIN — NADOLOL 80 MG: 40 TABLET ORAL at 05:37

## 2023-03-10 RX ADMIN — ENOXAPARIN SODIUM 40 MG: 40 INJECTION SUBCUTANEOUS at 08:51

## 2023-03-10 RX ADMIN — ASPIRIN 81 MG: 81 TABLET, CHEWABLE ORAL at 08:49

## 2023-03-10 RX ADMIN — SENNOSIDES AND DOCUSATE SODIUM 2 TABLET: 8.6; 5 TABLET ORAL at 08:52

## 2023-03-10 RX ADMIN — MELATONIN 6 MG: at 20:02

## 2023-03-10 RX ADMIN — ATORVASTATIN CALCIUM 20 MG: 20 TABLET, FILM COATED ORAL at 17:54

## 2023-03-10 RX ADMIN — ACETAMINOPHEN 650 MG: 325 TABLET ORAL at 11:07

## 2023-03-10 RX ADMIN — ACETAMINOPHEN 650 MG: 325 TABLET ORAL at 21:11

## 2023-03-10 RX ADMIN — GABAPENTIN 100 MG: 100 CAPSULE ORAL at 21:11

## 2023-03-10 RX ADMIN — OXYCODONE HYDROCHLORIDE 2.5 MG: 5 SOLUTION ORAL at 20:02

## 2023-03-10 RX ADMIN — SIMETHICONE 80 MG: 80 TABLET, CHEWABLE ORAL at 17:54

## 2023-03-10 RX ADMIN — PANTOPRAZOLE SODIUM 40 MG: 40 TABLET, DELAYED RELEASE ORAL at 05:37

## 2023-03-10 RX ADMIN — LORAZEPAM 0.5 MG: 0.5 TABLET ORAL at 08:49

## 2023-03-10 RX ADMIN — THIAMINE HCL TAB 100 MG 100 MG: 100 TAB at 08:58

## 2023-03-10 RX ADMIN — OXYCODONE HYDROCHLORIDE 2.5 MG: 5 SOLUTION ORAL at 05:39

## 2023-03-10 RX ADMIN — Medication 1 TABLET: at 08:50

## 2023-03-10 NOTE — ASSESSMENT & PLAN NOTE
Pt was found to be in vfib in the field and received shock x1 and CPR  ROSC was achieved in the field  In SLA pt had an episode of torsades during an echo procedure requiring a temporary pacer    Per EP likely precipitated by QT prolongation/SSRI and antibiotic  No obstructive CAD seen on cardiac cath 3/7  Status post ICD placement on 3/8  EP is following  Continue nadolol

## 2023-03-10 NOTE — PROGRESS NOTES
Code Status: Level 1 - Full Code  POA:    POLST:      Reason for ICU admission:   Pt was admitted for ICD placement and observation post ventricular arrhythmia and torsades  Active problems:   Active Problems:    Ventricular fibrillation (HCC)    Long QT syndrome type 2    Loss of consciousness (HCC)    Essential hypertension    CLL (chronic lymphocytic leukemia) (HCC)    Esophageal reflux    Hypercholesterolemia    Alcohol use    Sepsis (HCC)    Paroxysmal A-fib (HCC)    Prolonged QT interval  Resolved Problems:    * No resolved hospital problems  *    Loss of consciousness ([address] )  Assessment & Plan  Pt was watching a movie with his wife when he told his wife that he was not feeling well and immediately lost consciousness  EMS arrived and found the pt unresponsive  The pt was found to be in V-fib and the pt was shocked x1 and underwent CPR  ROSC achieved in the field  Upon arrival in the Ed, the pt was found to be breathing spontaneously, alert, awake, but lethargic  Pt was worked up by neurology and planned for a CT head and CTA head/neck to exclude critical vessel abnormalities that could lead to [facility]  Otherwise the neurology workup was unremarkable  CT head showed no acute intracranial abnormalities and CTA head/neck showed mild atherosclerotic disease of the carotid bifurcations and intracranial vasculature without flow restrictive stenosis  Pt did not have any focal neurologic deficits, strengths were 5/5 in upper and lower extremities bilaterally  No occlusion or thrombosis  With his recent cardiac history, LoC most likely a cardiac etiology  Long QT syndrome type 2  Assessment & Plan  Pt's grandson, son, brother, and himself tested positive for genetic long qt syndrome  Pt had an episode of torsades while receiving an echo while admitted to [facility]  Pt received a temporary pacemaker at [facility] and received an ICD 3/8 at [facility]  Nadolol 80 mg per Cardio's recommendation   Avoid QT prolonging agents  Ventricular fibrillation ([facility] [address] )  Assessment & Plan  Pt was found to be in vfib in the field and received shock x1 and CPR  ROSC was achieved in the field  In SLA pt had an episode of torsades during an echo procedure requiring a temporary pacer  Pt is currently managed with an ICD and nadolol  Avoid QT prolonging medication  Prolonged QT interval  Assessment & Plan  See assessment and plan for Long QT syndrome    Paroxysmal A-fib ([facility])  Assessment & Plan  History of paroxysmal A-fib    Continue home metoprolol     Sepsis ([facility] [address] )  Assessment & Plan  Pt presented with a low grade fever, leukocytosis, and >100k cfu of ecoli on urinalysis     Pt completed a course of ceftriaxone  Alcohol use  Assessment & Plan  Per pt's wife pt has a history of alcohol use  Pt drinks about 4 beers a day  Pt was found to have a macrocytosis and agitation  Pt received daily thiamine 100 mg and ativan  Pt is receiving scheduled dose of 0 5 mg daily and has required additional PRN doses as well  Pt is on CIWA protocol  Hypercholesterolemia  Assessment & Plan  Continue home atorvastatin     Esophageal reflux  Assessment & Plan  History of GERD     Continuing home protonix     CLL (chronic lymphocytic leukemia) ([facility])  Assessment & Plan  Pt has a history of CLL  Pt has a baseline of 14k-18k WBC  On admission his WBC was elevated at 49k  He is being followed by Dr Renata Hong outpatient every 6 months  Last visit on 11/2022  Currently under surveillance  Essential hypertension  Assessment & Plan  Continue home losartan  Holding home amlodipine 2/2 torsades  Consultants:   EP    History of Present Illness:   Michelle Plummer a 81 y  o  male who presents with PMH of paroxysmal afib, HTN, long QT, alcohol use, marijuana use, and CLL  Pt initially presented to [facility] ED 3/6 for loss of consciousness   Per the pt's wife, they were watching a movie when the pt told his wife that he was not feeling well and immediately lost consciousness  EMS arrived and found the patient to be unresponsive  The pt was found to be in V-fib and patient was shocked x1 and underwent CPR  ROSC achieved in the field  Upon arrival to the ED, the pt was found to be breathing spontaneously, alert, awake, but lethargic  Pt had similar episodes last year with concern of possible seizure activity  EKG showed A-fib with RVR  Pt denies chest pain, fevers, chills, nausea, or vomiting at this time       Cardiology was consulted and reviewed the ECG which revealed a regular narrow complex tachycardia suggesting SVT  Pt was then given adenosine  Pt was found to have elevated troponin, a low grade fever and elevated WBC showing evidence of sepsis  Urinalysis showed WBC and innumerable bacteria  Pt was started on ceftriaxone  Neurology was consulted for evaluation of his loss of consciousness and planned for CTA head/neck to exclude basilar/other critical vessel abnormalities that could lead to LOC/hypoperfusion and syncope  Otherwise neurology workup was unremarkable       On 3/7 pt was receiving an echo when the pt developed v fib arrest and went into torsades with QT of 671 ms  Started on lidocaine drip  Pt came out of arrhythmia prior to defibrillation  The echo showed apex hypokinesis which is new prior to echo  Pt received cardiac cath in [facility], had temporary pacer placed for recurrent torsades and has been transferred to [facility] MICU with plans for ICD placement      Summary of clinical course:   Pt was admitted on 3/7 on lidocaine gtt and ceftriaxone for his UTI  Received an ICD placement on 3/8, started on nadolol  Pt had persistent tachycardia which prompted another night of observation in the ICU  On 3/10 EP team changed the setting on the ICD and was able to stabilize the patient's tachycardia  Now the pt's hr is in the 76s  During the course of his hospitalization pt was found to be confused, agitated, and forgetful   ICU team was communicated that the pt had a history of dementia but according to the patient's son, this is a new finding at this time  Recent or scheduled procedures:   ICD placed on 3/8     Outstanding/pending diagnostics:   None     Cultures:   None        Mobilization Plan:   As tolerated     Nutrition Plan:   Cardiac diet     Invasive Devices Review  Invasive Devices     Peripheral Intravenous Line  Duration           Peripheral IV 03/06/23 Dorsal (posterior); Left Hand 4 days    Peripheral IV 03/06/23 Right Antecubital 4 days    Long-Dwell Peripheral IV (Midline) 03/07/23 Left 3 days          Drain  Duration           Urethral Catheter Temperature probe 16 Fr  4 days                Rationale for remaining devices: ICD     VTE Pharmacologic Prophylaxis: Rivaroxaban (Xarelto)  VTE Mechanical Prophylaxis: sequential compression device    Discharge Plan:   Patient should be ready for discharge to [address] [place] [address] on 3/10    Initial Physical Therapy Recommendations: not available  Initial Occupational Therapy Recommendations: not available   Initial /Plan: not available     Home medications that are not reordered and reason why:   None     Spoke with [facility] regarding transfer  Please contact critical care via [employer] with any questions or concerns  Portions of the record may have been created with voice recognition software  Occasional wrong word or "sound a like" substitutions may have occurred due to the inherent limitations of voice recognition software  Read the chart carefully and recognize, using context, where substitutions have occurred

## 2023-03-10 NOTE — ASSESSMENT & PLAN NOTE
Pt was watching a movie with his wife when he told his wife that he was not feeling well and immediately lost consciousness  EMS arrived and found the pt unresponsive  The pt was found to be in V-fib and the pt was shocked x1 and underwent CPR  ROSC achieved in the field  Upon arrival in the Ed, the pt was found to be breathing spontaneously, alert, awake, but lethargic  Pt was worked up by neurology, was unremarkable   Pt did not have any focal neurologic deficits, strengths were 5/5 in upper and lower extremities bilaterally  No occlusion or thrombosis       Likely precipitated by QT prolonging SSRI and antibiotics

## 2023-03-10 NOTE — ASSESSMENT & PLAN NOTE
Per pt's wife pt has a history of alcohol use  Pt drinks about 4 beers a day  Pt was found to have a macrocytosis and agitation  Pt received daily thiamine 100 mg and ativan  Pt is on CIWA protocol

## 2023-03-10 NOTE — CASE MANAGEMENT
Case Management Assessment & Discharge Planning Note    Patient name Daniel Arreola  Location MICU 05/MICU 05 MRN [id number]  : 1950 Date 3/9/2023       Current Admission Date: 3/7/2023  Current Admission Diagnosis:Ventricular fibrillation   Patient Active Problem List    Diagnosis Date Noted   • Unresponsive episode 2023   • Narrow complex tachycardia ([address] ) 2023   • Paroxysmal A-fib ([address] ) 2023   • Prolonged QT interval 2023   • Sepsis ([address] ) 2023   • Ventricular fibrillation ([facility] [address] ) 2023   • Seizure-like activity ([facility] [address] ) 2022   • Alcohol use 2022   • Diverticulum of bladder 2022   • Obesity (BMI 30 0-34 9) 2022   • Encysted hydrocele 2021   • Autoimmune hemolytic anemia ([facility] [address] [address] ) 2021   • GAVE (gastric antral vascular ectasia) 2021   • BPH with obstruction/lower urinary tract symptoms 2020   • Hypercholesterolemia 2019   • DIXIE not currently treated 10/17/2018   • DDD (degenerative disc disease), cervical 08/15/2018   • Essential hypertension 2017   • CLL (chronic lymphocytic leukemia) ([facility] [address] ) 2017   • Iron deficiency anemia 2017   • Pulmonary hypertension ([facility] [address] ) 05/15/2017   • Spinal stenosis of lumbar region 05/15/2017   • Erectile dysfunction of non-organic origin 2013   • Esophageal reflux 2012   • Allergic rhinitis due to pollen 2012      LOS (days): 2  Geometric Mean LOS (GMLOS) (days): 3 40  Days to GMLOS:1 3     OBJECTIVE:    Risk of Unplanned Readmission Score: 21 12         Current admission status: Inpatient       Preferred Pharmacy:   [address] [address]  Phone: [phone] Fax: 6 [address] [address]  Phone: [phone] Fax: [phone]    Primary Care Provider: Erinn Pablo MD    Primary Insurance: MEDICARE  Secondary Insurance: BLUE CROSS    ASSESSMENT:  Active Health Care Proxies    There are no active Health Care Proxies on file  Patient Information  Admitted from[de-identified] Facility (Transfer from [facility])  Mental Status: Alert  Assessment information provided by[de-identified] Spouse  Primary Caregiver: Self  Support Systems: Spouse/significant other, [address] of Residence: [address] [place] [address] do you live in?: [address] entry access options   Select all that apply : Stairs  Number of steps to enter home : 6 (4 steps down (without railing), then 2 steps up (with railing))  Type of Current Residence: Apartment  Floor Level: 1  Upon entering residence, is there a bedroom on the main floor (no further steps)?: Yes  Upon entering residence, is there a bathroom on the main floor (no further steps)?: Yes  In the last 12 months, was there a time when you were not able to pay the mortgage or rent on time?: No  In the last 12 months, how many places have you lived?: 1  In the last 12 months, was there a time when you did not have a steady place to sleep or slept in a shelter (including now)?: No  Homeless/housing insecurity resource given?: N/A  Living Arrangements: Lives w/ Spouse/significant other  Is patient a ?: No    Activities of Daily Living Prior to Admission  Functional Status: Independent  Completes ADLs independently?: Yes  Ambulates independently?: Yes  Does patient use assisted devices?: No  Does patient currently own DME?: Yes  What DME does the patient currently own?: [employer]  Does patient have a history of Outpatient Therapy (PT/OT)?: Yes (s/p hip replacement)  Does the patient have a history of Short-Term Rehab?: No  Does patient have a history of HHC?: Yes (s/p hip replacement, wife does not remember which agency was used )  Does patient currently have [facility]?: No         Patient Information Continued  Income Source: Pension/group home  Does patient have prescription coverage?: Yes  Within the past 12 months, you worried that your food would run out before you got the money to buy more : Never true  Within the past 12 months, the food you bought just didn't last and you didn't have money to get more : Never true  Food insecurity resource given?: N/A  Does patient receive dialysis treatments?: No  Does patient have a history of substance abuse?: No  Does patient have a history of Mental Health Diagnosis?: No         Means of Transportation  Means of Transport to Appts[de-identified] Drives Self  In the past 12 months, has lack of transportation kept you from medical appointments or from getting medications?: No  In the past 12 months, has lack of transportation kept you from meetings, work, or from getting things needed for daily living?: No  Was application for public transport provided?: N/A        DISCHARGE DETAILS:    Discharge planning discussed with[de-identified] Edi Gold (wife)  Freedom of Choice: Yes     CM contacted family/caregiver?: Yes  Were Treatment Team discharge recommendations reviewed with patient/caregiver?: No (TBD)          Contacts  Patient Contacts: Holly Lorenzo  Relationship to Patient[de-identified] Family  Contact Method: Phone  Phone Number: [phone]  Reason/Outcome: Continuity of Care, Discharge Planning, Emergency Contact                                                                        CM reviewed d/c planning process including the following: identifying help at home, patient preference for d/c planning needs, Discharge Lounge, [facility] Meds to Bed program, availability of treatment team to discuss questions or concerns patient and/or family may have regarding understanding medications and recognizing signs and symptoms once discharged  CM also encouraged patient to follow up with all recommended appointments after discharge  Patient advised of importance for patient and family to participate in managing patient’s medical well being

## 2023-03-10 NOTE — ASSESSMENT & PLAN NOTE
Pt has a history of CLL  Pt has a baseline of 14k-18k WBC  On admission his WBC was elevated at 49k  He is being followed by Dr Caroline Kelly outpatient every 6 months  Last visit on 11/2022  Currently under surveillance

## 2023-03-10 NOTE — PROGRESS NOTES
Progress Note - Electrophysiology  Jyoti Baez 81 y o  male MRN: [id number]  Unit/Bed#: MICU 05 Encounter: [id number]      Assessment:  1  [employer]onic "biventricular" ICD in situ  - underwent temp wire to permanent device 3/8  - was to undergo dual chamber device but developed complete heart block intraop  - LV lead attempted to be placed but no good targets  - LPF lead placed with narrow QRS complex, appearing wider POD1  - reprogrammed from MVP to DDI with delays adjusted, narrower complex  2  Long QT II Syndrome  - family history of this (grandson diagnosed with LQT2 at [address]), has reported history of seizure disorder  - outpatient beta blocker of metoprolol changed to nadolol  3  Cardiac arrest x2    - out of hospital cardiac arrest (EMS saw VF and was shock with CPR)   - had further VF while having echo done  - likely precipitated by QT prolonging SSRI and antibiotics  - quieted down with lido drip and temp wire to overdrive pace  - EF of 84% per echo this admission  - no obstructive CAD seen on cath this admission  - now underwent ICD implantation for secondary prevention  4  Paroxysmal atrial fibrillation/flutter  - atrial arrhythmia was tracked post procedure at 120bpm  - transition from Lovenox to Xarelto  5  Essential hypertension  6  Dementia/confusion   - had required Precedex yesterday - agitation and not adhering to bedrest given temp wire  - somewhat due to reported dementia, also likely due to anoxic insults from #3  7  CLL  8  Alcohol and marijuana use  9  UTI, being treated    Plan:  Patient was seen this morning and was still tachycardic at 120 and was tracking some atrial arrhythmia  Therefore, his device was changed to DDI and patient immediately came down into the 60s and 70s  Delays between RV ICD lead and LPF lead were changed with narrowing of QRS complex attempting to be achieved  Will see how his hemodynamics take not being tachycardic      As he does have some history of atrial arrhythmias and potentially atrial tachycardia or flutter that was being tracked at 120, would prefer to start anticoagulation of Xarelto  Greatly appreciate heart failure input as they will be starting losartan for GDMT  Subjective/Objective   Subjective: Patient is resting comfortably in bed, still asking many questions but not complaining of anything in particular  Objective:  Vitals: /81   Pulse (!) 118   Temp 97 5 °F (36 4 °C)   Resp 16   Ht 5' 8" (1 727 m)   Wt 90 3 kg (199 lb)   SpO2 95%   BMI 30 26 kg/m²     Vitals:    03/08/23 0547 03/09/23 1345   Weight: 90 5 kg (199 lb 8 3 oz) 90 3 kg (199 lb)     Orthostatic Blood Pressures    Flowsheet Row Most Recent Value   Blood Pressure 117/81 filed at 03/10/2023 0800   Patient Position - Orthostatic VS Lying filed at 03/08/2023 0400            Intake/Output Summary (Last 24 hours) at 3/10/2023 1008  Last data filed at 3/10/2023 0800  Gross per 24 hour   Intake 195 38 ml   Output 1475 ml   Net -1279 62 ml       Invasive Devices     Peripheral Intravenous Line  Duration           Peripheral IV 03/06/23 Dorsal (posterior); Left Hand 4 days    Peripheral IV 03/06/23 Right Antecubital 4 days    Long-Dwell Peripheral IV (Midline) 03/07/23 Left 2 days          Drain  Duration           Urethral Catheter Temperature probe 16 Fr  3 days                          Scheduled Meds:  Current Facility-Administered Medications   Medication Dose Route Frequency Provider Last Rate   • acetaminophen  650 mg Oral Q6H PRN MATHEW Grijalva     • aspirin  81 mg Oral Daily MATHEW Grijalva     • atorvastatin  20 mg Oral Daily With [address], CRNP     • folic acid  1 mg Oral Daily CHONG GrijalvaNP     • gabapentin  100 mg Oral HS Claudia Chavez, DO     • HYDROmorphone  0 2 mg Intravenous Q4H PRN Claudia Chavez DO     • lidocaine  1 patch Topical Daily Claudia Chavez, DO     • LORazepam  0 5 mg Oral Once Thurmon Douse Giles Beckford MD     • LORazepam  0 5 mg Oral Q8H PRN Nilesh Vieira MD     • melatonin  6 mg Oral HS Jose Molina Ord, CRNP     • montelukast  10 mg Oral HS Jose Molina Ord, CRNP     • multivitamin-minerals  1 tablet Oral Daily Joseandrew Overtondominick ITACDG, CRNP     • nadolol  80 mg Oral Early Morning Leandro Gosselin, MD     • oxyCODONE  2 5 mg Oral Q4H PRN Loral Hang, DO     • oxyCODONE  5 mg Oral Q4H PRN Loral Hang, DO     • pantoprazole  40 mg Oral Early Morning Jose Molina Sonday, CRNP     • polyethylene glycol  17 g Oral Daily PRN Nilesh Vieira MD     • polyethylene glycol  17 g Oral BID Loral Hang, DO     • [START ON 3/11/2023] rivaroxaban  20 mg Oral Daily With Breakfast Maranda Ni PA-C     • senna-docusate sodium  2 tablet Oral BID Loral Hang, DO     • thiamine  100 mg Oral Daily Jose Concepcionday, CRNP       Continuous Infusions:   PRN Meds: •  acetaminophen  •  HYDROmorphone  •  LORazepam  •  oxyCODONE  •  oxyCODONE  •  polyethylene glycol    Review of Systems:  ROS  ROS as noted above, otherwise 12 point review of systems was performed and is negative  Physical Exam:   Physical Exam              Lab Results: I have personally reviewed pertinent lab results      Results from last 7 days   Lab Units 03/10/23  0541 03/09/23  0516 03/08/23  0427   WBC Thousand/uL 28 90* 33 53* 15 53*   HEMOGLOBIN g/dL 9 4* 9 5* 9 3*   HEMATOCRIT % 28 9* 28 7* 27 9*   PLATELETS Thousands/uL 167 168 116*     Results from last 7 days   Lab Units 03/10/23  0541 03/09/23  0516 03/08/23  0427   POTASSIUM mmol/L 4 0 4 4 3 5   CHLORIDE mmol/L 100 104 102   CO2 mmol/L 25 22 26   BUN mg/dL 15 14 13   CREATININE mg/dL 0 81 0 71 0 67   CALCIUM mg/dL 8 6 8 4 8 0*     Results from last 7 days   Lab Units 03/06/23  1357   INR  1 25*   PTT seconds 26     Results from last 7 days   Lab Units 03/10/23  0541 03/09/23  0516 03/08/23  0427   MAGNESIUM mg/dL 2 4 2 4 2 6 Imaging: I have personally reviewed pertinent reports  Results for orders placed during the hospital encounter of 01/10/21    Echo complete with contrast if indicated    Narrative  [facility], [address] [facility]  [phone]    Transthoracic Echocardiogram  2D, M-mode, Doppler, and Color Doppler    Study date:  15-Som-2021    Patient: Daniel Evans  MR number: [id number]  Account number: [de-identified]  : 1950  Age: 78 years  Gender: Male  Status: Inpatient  Location: ICU  Height: 66 in  Weight: 195 lb  BP: 149/ 71 mmHg    Indications: New V-Tach, COVID-19  Diagnoses: I47 2 - Ventricular tachycardia    Sonographer:  HARJINDER Cline  Primary Physician:  Lizy Cha MD  Referring Physician:  Casey Stubbs MD  Group:  [facility]  Interpreting Physician:  Joan Cervantes DO    SUMMARY    SUMMARY:  Limited study  LEFT VENTRICLE:  Systolic function was normal  Ejection fraction was estimated to be 60 %  Although no diagnostic regional wall motion abnormality was identified, this possibility cannot be completely excluded on the basis of this study  RIGHT VENTRICLE:  The ventricle was mildly to moderately dilated  Systolic function was mildly reduced  Free wall hypokinesis  LEFT ATRIUM:  The atrium was dilated  MITRAL VALVE:  There was mild regurgitation  AORTIC VALVE:  There was mild to moderate regurgitation  TRICUSPID VALVE:  There was moderate regurgitation  Estimated peak PA pressure was 75 mmHg  IVC, HEPATIC VEINS:  The inferior vena cava was dilated, with poor inspiratory collapse, consistent with elevated right atrial pressure  HISTORY: PRIOR HISTORY: COVID-19 +, hypertension, PHTN, Luekemia, Alcohol Abuse, Former Smoker  PROCEDURE: The study was performed in the ICU  This was a routine study  The transthoracic approach was used   The study included complete 2D imaging, M-mode, complete spectral Doppler, and color Doppler  The heart rate was 47 bpm, at the  start of the study  Images were obtained from the parasternal, apical, subcostal, and suprasternal notch acoustic windows  Image quality was adequate  LEFT VENTRICLE: Size was normal  Systolic function was normal  Ejection fraction was estimated to be 60 %  Although no diagnostic regional wall motion abnormality was identified, this possibility cannot be completely excluded on the basis  of this study  RIGHT VENTRICLE: The ventricle was mildly to moderately dilated  Systolic function was mildly reduced  Free wall hypokinesis  Wall thickness was normal     LEFT ATRIUM: The atrium was dilated  RIGHT ATRIUM: Size was normal     MITRAL VALVE: Valve structure was normal  There was normal leaflet separation  DOPPLER: The transmitral velocity was within the normal range  There was no evidence for stenosis  There was mild regurgitation  AORTIC VALVE: The valve was trileaflet  Leaflets exhibited normal thickness and normal cuspal separation  DOPPLER: Transaortic velocity was within the normal range  There was no evidence for stenosis  There was mild to moderate  regurgitation  TRICUSPID VALVE: The valve structure was normal  There was normal leaflet separation  DOPPLER: The transtricuspid velocity was within the normal range  There was no evidence for stenosis  There was moderate regurgitation  Estimated peak PA  pressure was 75 mmHg  PULMONIC VALVE: Not well visualized  PERICARDIUM: There was no pericardial effusion  The pericardium was normal in appearance  AORTA: The root exhibited normal size  SYSTEMIC VEINS: IVC: The inferior vena cava was dilated, with poor inspiratory collapse, consistent with elevated right atrial pressure      SYSTEM MEASUREMENT TABLES    2D  %FS: 33 33 %  Ao Diam: 3 62 cm  EDV(Teich): 99 88 ml  EF(Teich): 62 02 %  ESV(Teich): 37 93 ml  IVSd: 1 15 cm  LA Area: 26 46 cm2  LA Diam: 4 59 cm  LVEDV MOD A4C: 166 8 ml  LVEF MOD A4C: 60 98 %  LVESV MOD A4C: 65 09 ml  LVIDd: 4 65 cm  LVIDs: 3 1 cm  LVLd A4C: 9 42 cm  LVLs A4C: 7 89 cm  LVPWd: 1 03 cm  RA Area: 16 54 cm2  RVIDd: 5 01 cm  SV MOD A4C: 101 71 ml  SV(Teich): 61 94 ml    CW  AR Dec [place]: 3 09 m/s2  AR Dec Time: 1445 59 ms  AR PHT: 419 22 ms  AR Vmax: 4 47 m/s  AR maxP 75 mmHg  AV Env  Ti: 304 47 ms  AV MaxP 7 mmHg  AV VTI: 33 72 cm  AV Vmax: 1 78 m/s  AV Vmean: 1 11 m/s  AV meanP 58 mmHg  MR VTI: 204 77 cm  MR Vmax: 4 56 m/s  MR Vmean: 3 84 m/s  MR maxP 27 mmHg  MR meanP 52 mmHg  TR MaxP 73 mmHg  TR Vmax: 3 96 m/s    PW  LVOT Env  Ti: 374 88 ms  LVOT VTI: 24 53 cm  LVOT Vmax: 1 04 m/s  LVOT Vmean: 0 65 m/s  LVOT maxP 42 mmHg  LVOT meanP 05 mmHg  MV A Adithya: 0 16 m/s  MV Dec [place]: 2 45 m/s2  MV DecT: 254 72 ms  MV E Adithya: 0 62 m/s  MV E/A Ratio: 3 99  MV PHT: 73 87 ms  MVA By PHT: 2 98 cm2    [address] Accredited Echocardiography Laboratory    Prepared and electronically signed by    Sara Fairbanks DO  Signed 15-Som-2021 12:34:36      VTE Pharmacologic Prophylaxis: Xarelto  VTE Mechanical Prophylaxis: sequential compression device

## 2023-03-10 NOTE — PLAN OF CARE
Problem: SAFETY,RESTRAINT: NV/NON-SELF DESTRUCTIVE BEHAVIOR  Goal: Remains free of harm/injury (restraint for non violent/non self-detsructive behavior)  Description: INTERVENTIONS:  - Instruct patient/family regarding restraint use   - Assess and monitor physiologic and psychological status   - Provide interventions and comfort measures to meet assessed patient needs   - Identify and implement measures to help patient regain control  - Assess readiness for release of restraint   Outcome: Progressing  Goal: Returns to optimal restraint-free functioning  Description: INTERVENTIONS:  - Assess the patient's behavior and symptoms that indicate continued need for restraint  - Identify and implement measures to help patient regain control  - Assess readiness for release of restraint   Outcome: Progressing     Problem: MOBILITY - ADULT  Goal: Maintain or return to baseline ADL function  Description: INTERVENTIONS:  -  Assess patient's ability to carry out ADLs; assess patient's baseline for ADL function and identify physical deficits which impact ability to perform ADLs (bathing, care of mouth/teeth, toileting, grooming, dressing, etc )  - Assess/evaluate cause of self-care deficits   - Assess range of motion  - Assess patient's mobility; develop plan if impaired  - Assess patient's need for assistive devices and provide as appropriate  - Encourage maximum independence but intervene and supervise when necessary  - Involve family in performance of ADLs  - Assess for home care needs following discharge   - Consider OT consult to assist with ADL evaluation and planning for discharge  - Provide patient education as appropriate  Outcome: Progressing  Goal: Maintains/Returns to pre admission functional level  Description: INTERVENTIONS:  - Perform BMAT or MOVE assessment daily    - Set and communicate daily mobility goal to care team and patient/family/caregiver     - Collaborate with rehabilitation services on mobility goals if consulted  - Perform Range of Motion 12 times a day  - Reposition patient every 2 hours    - Dangle patient 1 times a day  - Stand patient 3 times a day  - Ambulate patient 1 times a day  - Out of bed to chair 1 times a day   - Out of bed for meals 1 times a day  - Out of bed for toileting  - Record patient progress and toleration of activity level   Outcome: Progressing     Problem: Prexisting or High Potential for Compromised Skin Integrity  Goal: Skin integrity is maintained or improved  Description: INTERVENTIONS:  - Identify patients at risk for skin breakdown  - Assess and monitor skin integrity  - Assess and monitor nutrition and hydration status  - Monitor labs   - Assess for incontinence   - Turn and reposition patient  - Assist with mobility/ambulation  - Relieve pressure over bony prominences  - Avoid friction and shearing  - Provide appropriate hygiene as needed including keeping skin clean and dry  - Evaluate need for skin moisturizer/barrier cream  - Collaborate with interdisciplinary team   - Patient/family teaching  - Consider wound care consult   Outcome: Progressing

## 2023-03-10 NOTE — ASSESSMENT & PLAN NOTE
Pt's grandson, son, brother, and himself tested positive for genetic long qt syndrome  Pt had an episode of torsades while receiving an echo while admitted to [facility]  Pt received a temporary pacemaker at [facility] and received an ICD 3/8 at [facility]  Nadolol 80 mg per Cardio's recommendation   Avoid QT prolonging agents

## 2023-03-10 NOTE — PROGRESS NOTES
Daily Progress Note - Critical Care   Jori Cassidy 81 y o  male MRN: [id number]  Unit/Bed#: MICU 05 Encounter: [id number]        ----------------------------------------------------------------------------------------  HPI/24hr events: Tamy Henderson a 81 y  o  male who presents with PMH of paroxysmal afib, HTN, long QT, alcohol use, marijuana use, dementia and CLL  Pt initially presented to [facility] ED 3/6 for loss of consciousness  Per the pt's wife, they were watching a movie when the pt told his wife that he was not feeling well and immediately lost consciousness  EMS arrived and found the patient to be unresponsive  The pt was found to be in V-fib and patient was shocked x1 and underwent CPR  ROSC achieved in the field  Upon arrival to the ED, the pt was found to be breathing spontaneously, alert, awake, but lethargic  Pt had similar episodes last year with concern of possible seizure activity  EKG showed A-fib with RVR  Pt denies chest pain, fevers, chills, nausea, or vomiting at this time       Cardiology was consulted and reviewed the ECG which revealed a regular narrow complex tachycardia suggesting SVT  Pt was then given adenosine  Pt was found to have elevated troponin, a low grade fever and elevated WBC showing evidence of sepsis  Urinalysis showed WBC and innumerable bacteria  Pt was started on ceftriaxone  Neurology was consulted for evaluation of his loss of consciousness and planned for CTA head/neck to exclude basilar/other critical vessel abnormalities that could lead to LOC/hypoperfusion and syncope  Otherwise neurology workup was unremarkable       On 3/7 pt was receiving an echo when the pt developed v fib arrest and went into torsades with QT of 671 ms  Started on lidocaine drip  Pt came out of arrhythmia prior to defibrillation  The echo showed apex hypokinesis which is new prior to echo   Pt received cardiac cath in [facility], had temporary pacer placed for recurrent torsades and [facility] for [facility]       No acute events overnight  Pt remains tachycardic at around 130 bpm  Pt had a BM last night      ---------------------------------------------------------------------------------------  SUBJECTIVE  Pt seen and examined, lying in bed comfortably  Review of Systems   Constitutional: Negative for chills, diaphoresis and fever  HENT: Negative for sinus pressure, sinus pain and tinnitus  Eyes: Negative for photophobia and redness  Respiratory: Negative for cough, chest tightness and shortness of breath  Cardiovascular: Negative for chest pain  Gastrointestinal: Negative for abdominal distention and abdominal pain  Genitourinary: Negative for flank pain and hematuria  Skin: Negative for rash  Neurological: Negative for dizziness and headaches  All other systems reviewed and are negative      Review of systems was reviewed and negative unless stated above in HPI/24-hour events   ---------------------------------------------------------------------------------------  Assessment and Plan:    Neuro:   • Diagnosis: syncope   o Plan: likely 2/2 arrhythmia; torsades/VT/afib  o ICD placed by EP   o Currently on nadolol   • Diagnosis: Hx of dementia   o Plan: avoid qt prolonging meds      CV:   • Diagnosis: Torsades with QT on ECG of 671   o Plan: Continue nadolol  o Avoid QT prolonging meds   • Diagnosis: S/P cath- no flow significant atherosclerotic lesions were identified; temporary pacing wire placed for recurrent torsades during the procedure  • Diagnosis: HTN  o Continue home norvasc  • Diagnosis: Paroxysmal afib  o Continue metoprolol per cards  • Diagnosis: HLD  o Continue home atorvastatin       Pulm:  • Diagnosis: no acute concerns      GI:   • Diagnosis: GERD  o Plan: continue home protonix       :   • Diagnosis: UTI   o Plan: SIRS criteria,culture >100k GNR   o Completed abx       F/E/N:   • Fluids: no concerns  • Electrolyte: replete PRN  • Nutrition: cardiac diet       Heme/Onc:   • Diagnosis: Hx of CLL  o Plan: managed outpatient   o Baseline WBC of 14k-18k   o WBC of 28 90 from 33 53  • Diagnosis: macrocytic anemia- MCV of 108   o Plan: JIMWA       Endo:   • Diagnosis: no acute concerns      ID:   • Diagnosis: Sepsis  o Plan: Leukocytosis, fevers, >100k E Coli in urine   o Finished abx       MSK/Skin:   • Diagnosis: no acute concerns     Patient appropriate for transfer out of the ICU today?: Patient does not meet criteria for ICU Follow-up Clinic; referral has not been made  Disposition: Transfer to Med Surg with Telemetry   Code Status: Level 1 - Full Code  ---------------------------------------------------------------------------------------  ICU CORE MEASURES    Prophylaxis   VTE Pharmacologic Prophylaxis: Enoxaparin (Lovenox)  VTE Mechanical Prophylaxis: sequential compression device  Stress Ulcer Prophylaxis: Pantoprazole PO    ABCDE Protocol (if indicated)  Plan to perform spontaneous awakening trial today? Not applicable  Plan to perform spontaneous breathing trial today? Not applicable  Obvious barriers to extubation? Not applicable  CAM-ICU: Negative    Invasive Devices Review  Invasive Devices     Peripheral Intravenous Line  Duration           Peripheral IV 03/06/23 Dorsal (posterior); Left Hand 4 days    Peripheral IV 03/06/23 Right Antecubital 4 days    Long-Dwell Peripheral IV (Midline) 03/07/23 Left 2 days          Drain  Duration           Urethral Catheter Temperature probe 16 Fr  3 days              Can any invasive devices be discontinued today?  Not applicable  ---------------------------------------------------------------------------------------  OBJECTIVE    Vitals   Vitals:    03/10/23 0351 03/10/23 0537 03/10/23 0539 03/10/23 0600   BP: 114/76 126/85 126/85 124/80   Pulse: (!) 130 (!) 119 (!) 128 (!) 128   Resp: 17 22 17   Temp:   98 2 °F (36 8 °C) 98 6 °F (37 °C)   TempSrc:       SpO2: 93%  97% 94%   Weight: Height:         Temp (24hrs), Av 1 °F (37 3 °C), Min:98 2 °F (36 8 °C), Max:99 7 °F (37 6 °C)  Current: Temperature: 98 6 °F (37 °C)  HR: 120  BP: 114/76  RR: 17  SpO2: 93    Respiratory:  SpO2: SpO2: 95 %, SpO2 Activity: SpO2 Activity: At Rest, SpO2 Device: O2 Device: Nasal cannula, Capnography:    Nasal Cannula O2 Flow Rate (L/min): 2 L/min    Invasive/non-invasive ventilation settings   Respiratory    Lab Data (Last 4 hours)    None         O2/Vent Data (Last 4 hours)    None                Physical Exam  Vitals reviewed  Constitutional:       General: He is not in acute distress  Appearance: Normal appearance  He is normal weight  He is not toxic-appearing  HENT:      Head: Normocephalic and atraumatic  Right Ear: External ear normal       Left Ear: External ear normal    Eyes:      Extraocular Movements: Extraocular movements intact  Conjunctiva/sclera: Conjunctivae normal       Pupils: Pupils are equal, round, and reactive to light  Cardiovascular:      Rate and Rhythm: Regular rhythm  Tachycardia present  Pulses: Normal pulses  Heart sounds: Normal heart sounds  Pulmonary:      Effort: Pulmonary effort is normal       Breath sounds: Normal breath sounds  Abdominal:      General: Bowel sounds are normal  There is distension  Palpations: Abdomen is soft  Tenderness: There is no abdominal tenderness  There is no guarding  Skin:     General: Skin is warm and dry  Neurological:      General: No focal deficit present  Mental Status: He is alert        Comments: Baseline dementia but lucid during exam                Laboratory and Diagnostics:  Results from last 7 days   Lab Units 03/10/23  0541 23  0516 23  0427 23  2326 23  0605 23  1357   WBC Thousand/uL 28 90* 33 53* 15 53* 15 23* 26 79* 49 68*   HEMOGLOBIN g/dL 9 4* 9 5* 9 3* 9 1* 10 8* 13 2   HEMATOCRIT % 28 9* 28 7* 27 9* 27 4* 31 2* 38 6   PLATELETS Thousands/uL 167 168 116* 123* 163 228   BANDS PCT %  --   --   --   --   --  8   MONO PCT %  --   --   --   --   --  0*     Results from last 7 days   Lab Units 03/10/23  0541 03/09/23  0516 03/08/23 0427 03/07/23 2326 03/07/23  0605 03/06/23  1357   SODIUM mmol/L 132* 132* 133* 133* 130* 133*   POTASSIUM mmol/L 4 0 4 4 3 5 3 6 4 0 4 1   CHLORIDE mmol/L 100 104 102 101 95* 98   CO2 mmol/L 25 22 26 26 24 20*   ANION GAP mmol/L 7 6 5 6 11 15*   BUN mg/dL 15 14 13 12 14 10   CREATININE mg/dL 0 81 0 71 0 67 0 71 0 91 0 96   CALCIUM mg/dL 8 6 8 4 8 0* 7 7* 8 4 9 4   GLUCOSE RANDOM mg/dL 115 117 115 131 135 234*   ALT U/L  --   --   --   --  30 39   AST U/L  --   --   --   --  31 49*   ALK PHOS U/L  --   --   --   --  66 88   ALBUMIN g/dL  --   --   --   --  4 4 4 9   TOTAL BILIRUBIN mg/dL  --   --   --   --  0 92 1 01*     Results from last 7 days   Lab Units 03/10/23  0541 03/09/23  0516 03/08/23 0427 03/07/23 2326 03/07/23  0605 03/06/23  1357   MAGNESIUM mg/dL 2 4 2 4 2 6 2 5 2 2 1 9   PHOSPHORUS mg/dL 2 9 3 1  --   --   --  5 1*      Results from last 7 days   Lab Units 03/06/23  1357   INR  1 25*   PTT seconds 26          Results from last 7 days   Lab Units 03/06/23  1541 03/06/23  1357   LACTIC ACID mmol/L 3 5* 5 4*     ABG:    VBG:    Results from last 7 days   Lab Units 03/06/23  1357   PROCALCITONIN ng/ml 0 05       Micro  Results from last 7 days   Lab Units 03/06/23  1424 03/06/23  1400 03/06/23  1357   BLOOD CULTURE   --  No Growth at 72 hrs  No Growth at 72 hrs  URINE CULTURE  >100,000 cfu/ml Escherichia coli*  --   --        EKG: no new ekg  Imaging: echo done yesterday showing LV normal size, EF of 52%, systolic function reduced, diastolic function abnormal, no thrombus  IBS abnormal motion consistent with LBBB  LA mildly dilated, RA mildly dilated, AV mild regurge, TV mild regurge  RV systolic function reduced and abnormal wall motion      I have personally reviewed pertinent reports        Intake and Output  I/O 03/08 0701  03/09 0700 03/09 0701  03/10 0700    P  O   340    I V  (mL/kg) 1096 5 (12 1) 75 4 (0 8)    IV Piggyback 100     Total Intake(mL/kg) 1196 5 (13 2) 415 4 (4 6)    Urine (mL/kg/hr) 430 (0 2) 1350 (0 6)    Blood 30     Total Output 460 1350    Net +736 5 -934 6              UOP: 1350 ml    Height and Weights   Height: 5' 8" (172 7 cm)  IBW (Ideal Body Weight): 68 4 kg  Body mass index is 30 26 kg/m²  Weight (last 2 days)     Date/Time Weight    03/09/23 1345 90 3 (199)    03/08/23 0547 90 5 (199 52)            Nutrition       Diet Orders   (From admission, onward)             Start     Ordered    03/08/23 2050  Diet Cardiovascular; Cardiac  Diet effective midnight        References:    Nutrtion Support Algorithm Enteral vs  Parenteral   Question Answer Comment   Diet Type Cardiovascular    Cardiac Cardiac    RD to adjust diet per protocol?  Yes        03/08/23 2049                  Active Medications  Scheduled Meds:  Current Facility-Administered Medications   Medication Dose Route Frequency Provider Last Rate   • acetaminophen  650 mg Oral Q6H PRN Sim Bells Sonday, CRNP     • aspirin  81 mg Oral Daily Sim Bells Sonday, CRNP     • atorvastatin  20 mg Oral Daily With [address] Sonday, CRNP     • enoxaparin  40 mg Subcutaneous Daily Sim Bells Sonday, CRNP     • folic acid  1 mg Oral Daily Sim Bells Sonday, CRNP     • gabapentin  100 mg Oral HS Latrice Beckert, DO     • HYDROmorphone  0 2 mg Intravenous Q4H PRN Latrice Vasques, DO     • lidocaine  1 patch Topical Daily Latrice Beckert, DO     • LORazepam  0 5 mg Oral Once Maya Garner MD     • LORazepam  1 mg Oral Q8H PRN Marley Oates, DO     • melatonin  6 mg Oral HS Sim Bells Sonday, CRNP     • montelukast  10 mg Oral HS Sim Bells Ord, CRNP     • multivitamin-minerals  1 tablet Oral Daily Sim Bells Sonday, CRNP     • nadolol  80 mg Oral Early Morning Kayla Mckeon MD     • oxyCODONE  2 5 mg Oral Q4H PRN Latrice Vasques, DO     • oxyCODONE  5 mg Oral Q4H PRN Latrice Vasques, DO     • pantoprazole  40 mg Oral Early Morning MATHEW Del Cid     • polyethylene glycol  17 g Oral Daily PRN aMya Garner MD     • polyethylene glycol  17 g Oral BID Latrice Vasques, DO     • senna-docusate sodium  2 tablet Oral BID Latrice Vasques DO     • thiamine  100 mg Oral Daily MATHEW Del Cid       Continuous Infusions:     PRN Meds:   acetaminophen, 650 mg, Q6H PRN  HYDROmorphone, 0 2 mg, Q4H PRN  LORazepam, 1 mg, Q8H PRN  oxyCODONE, 2 5 mg, Q4H PRN  oxyCODONE, 5 mg, Q4H PRN  polyethylene glycol, 17 g, Daily PRN        Allergies   Allergies   Allergen Reactions   • Codeine Other (See Comments)     agitated   • Sulfamethoxazole-Trimethoprim GI Intolerance and Abdominal Pain     upset stomach     ---------------------------------------------------------------------------------------  Advance Directive and Living Will: Yes    Power of :    POLST:    ---------------------------------------------------------------------------------------  Care Time Delivered:   45 min    Florian Gonzales      Portions of the record may have been created with voice recognition software  Occasional wrong word or "sound a like" substitutions may have occurred due to the inherent limitations of voice recognition software    Read the chart carefully and recognize, using context, where substitutions have occurred

## 2023-03-10 NOTE — CONSULTS
Reason for Consult / Principal Problem:    Physician Requesting Consult:  Louise Romero*    Cardiologist: Dr Cayden Grace consult to Heart Failure Service     Date/Time 3/10/2023 12:49 PM     Performed by  Patricia Cox DO     Authorized by Sonya Garcia PA-C              Assessment and Plan      Current Problem List   Principal Problem:    Long QT syndrome type 2    Assessment/Plan:    1  Acute cardiomyopathy - after cardiac arrest with shock  Now S/P BiV ICD  Etiology most likely 2/2 acute stress cardiomyopathy  Previous echocardiograms reviewed  Patient with long standing history of dilated RV and elevated RVSP  Echo this admission showed decreased RV however RV appears to significantly improved from echo day prior  Patient is now hemodynamically stable  Off pressors for two days  [place] off since yesterday  Cath this admission with non obstructive CAD  TSH normal - would hold off on further CM work up at this time given echocardiographic appearance of [place] with negative cath  · Start Losartan 12 5 mg    · Patient on nadolol for LQT2 - unable to give metoprolol succinate  · I and O  · Daily weights  · Will need follow up with cardiology for resolution  · TSH negative  2   Long QT syndrome - longstanding family history S/P cardiac arrest  ·  Nadolol per EP  · S/P ICD  3  VF arrest  ·  As above  4  Elevated RVSP - thought to be secondary to untreated DIXIE - could consider outpatient RHC if aligns with patient's goals - although in absence of patient willingness to use CPAP would not likely  significantly  ·  Continue outpatient follow up  Subjective     CC: Cardiomyopathy       HPI: This is a 63-year-old male with a past medical history significant for BPH, autoimmune hemolytic anemia, chronic lymphocytic leukemia, hypertension, paroxysmal atrial fibrillation, pulmonary hypertension which is being followed by pulmonology etiology thought to be likely secondary to uncontrolled untreated sleep apnea and possible heart failure with preserved ejection fraction  No previous history of right heart catheterization  The patient presented to [address] [address] secondary to unresponsive episode  On arrival of EMS the patient was noted to lose consciousness in front of EMS, he had a witnessed ventricular fibrillation and received 1 shock with CPR  The patient underwent a cardiac catheterization which was notable for a proximal LAD to mid LAD was 40% stenosed  He separately was undergoing echocardiogram at [address] and had a cardiac arrest with rhythm suggestive of torsades  QTc on the EKG at this time was 670 ms  The patient was started on a lidocaine drip did not require defibrillation at this time  He underwent a temporary pacer  Of note, the patient has a family history of long QT syndrome with the patient's grandson diagnosed with this who is being seen at [facility]  He also has a son with the same gene abnormality without the phenotype  An echocardiogram was performed which was notable for EF 50% biatrial dilation, RVSP 56 mmHg, as well as mild to moderate reduced RV with severe hypokinesis of the mid to apical free wall  There is concern for biventricular stress cardiomyopathy  The patient did undergo a dual-chamber ICD  Currently, the patient is off lidocaine drip he is maintained on nadolol  He is a former smoker  He does currently smoke marijuana  he is currently V paced  He is seen at bedside he reports feeling better - he is still mildly confused         Outpatient meds:    Outpatient Medications  Acetaminophen 500 MG  amLODIPine (NORVASC) 5 mg tablet  ascorbic acid (VITAMIN C) 250 MG CHEW  aspirin 81 MG tablet  atorvastatin (LIPITOR) 20 mg tablet  Blood Pressure Monitoring (Omron 5 Series BP Monitor) PEE  cetirizine (ZyrTEC) 10 mg tablet  LORazepam (Ativan) 0 5 mg tablet  metoprolol tartrate (LOPRESSOR) 25 mg tablet  montelukast (SINGULAIR) 10 mg tablet  Omega-3 Fatty Acids (FISH OIL) 1,000 mg  omeprazole (PriLOSEC) 40 MG capsule  sertraline (ZOLOFT) 50 mg tablet  vitamin B-12 (CYANOCOBALAMIN) 500 MCG TABS ()      --------------------------------------------------------------------------------  ECHO:   Results for orders placed during the hospital encounter of 01/10/21    Echo complete with contrast if indicated    Narrative  [place] [address] [address] [place] [address]  [phone]    Transthoracic Echocardiogram  2D, M-mode, Doppler, and Color Doppler    Study date:  15-Som-2021    Patient: Niko Chisholm  MR number: [id number]  Account number: [de-identified]  : 1950  Age: 78 years  Gender: Male  Status: Inpatient  Location: ICU  Height: 66 in  Weight: 195 lb  BP: 149/ 71 mmHg    Indications: New V-Tach, COVID-19  Diagnoses: I47 2 - Ventricular tachycardia    Sonographer:  HARJINDER Pena  Primary Physician:  Olivier Hawk MD  Referring Physician:  Janace Halsted, MD  Group:  [facility]  Interpreting Physician:  Marek Narvaez DO    SUMMARY    SUMMARY:  Limited study  LEFT VENTRICLE:  Systolic function was normal  Ejection fraction was estimated to be 60 %  Although no diagnostic regional wall motion abnormality was identified, this possibility cannot be completely excluded on the basis of this study  RIGHT VENTRICLE:  The ventricle was mildly to moderately dilated  Systolic function was mildly reduced  Free wall hypokinesis  LEFT ATRIUM:  The atrium was dilated  MITRAL VALVE:  There was mild regurgitation  AORTIC VALVE:  There was mild to moderate regurgitation  TRICUSPID VALVE:  There was moderate regurgitation  Estimated peak PA pressure was 75 mmHg  IVC, HEPATIC VEINS:  The inferior vena cava was dilated, with poor inspiratory collapse, consistent with elevated right atrial pressure      HISTORY: PRIOR HISTORY: COVID-19 +, hypertension, PHTN, Luekemia, Alcohol Abuse, Former Smoker  PROCEDURE: The study was performed in the ICU  This was a routine study  The transthoracic approach was used  The study included complete 2D imaging, M-mode, complete spectral Doppler, and color Doppler  The heart rate was 47 bpm, at the  start of the study  Images were obtained from the parasternal, apical, subcostal, and suprasternal notch acoustic windows  Image quality was adequate  LEFT VENTRICLE: Size was normal  Systolic function was normal  Ejection fraction was estimated to be 60 %  Although no diagnostic regional wall motion abnormality was identified, this possibility cannot be completely excluded on the basis  of this study  RIGHT VENTRICLE: The ventricle was mildly to moderately dilated  Systolic function was mildly reduced  Free wall hypokinesis  Wall thickness was normal     LEFT ATRIUM: The atrium was dilated  RIGHT ATRIUM: Size was normal     MITRAL VALVE: Valve structure was normal  There was normal leaflet separation  DOPPLER: The transmitral velocity was within the normal range  There was no evidence for stenosis  There was mild regurgitation  AORTIC VALVE: The valve was trileaflet  Leaflets exhibited normal thickness and normal cuspal separation  DOPPLER: Transaortic velocity was within the normal range  There was no evidence for stenosis  There was mild to moderate  regurgitation  TRICUSPID VALVE: The valve structure was normal  There was normal leaflet separation  DOPPLER: The transtricuspid velocity was within the normal range  There was no evidence for stenosis  There was moderate regurgitation  Estimated peak PA  pressure was 75 mmHg  PULMONIC VALVE: Not well visualized  PERICARDIUM: There was no pericardial effusion  The pericardium was normal in appearance  AORTA: The root exhibited normal size      SYSTEMIC VEINS: IVC: The inferior vena cava was dilated, with poor inspiratory collapse, consistent with elevated right atrial pressure  SYSTEM MEASUREMENT TABLES    2D  %FS: 33 33 %  Ao Diam: 3 62 cm  EDV(Teich): 99 88 ml  EF(Teich): 62 02 %  ESV(Teich): 37 93 ml  IVSd: 1 15 cm  LA Area: 26 46 cm2  LA Diam: 4 59 cm  LVEDV MOD A4C: 166 8 ml  LVEF MOD A4C: 60 98 %  LVESV MOD A4C: 65 09 ml  LVIDd: 4 65 cm  LVIDs: 3 1 cm  LVLd A4C: 9 42 cm  LVLs A4C: 7 89 cm  LVPWd: 1 03 cm  RA Area: 16 54 cm2  RVIDd: 5 01 cm  SV MOD A4C: 101 71 ml  SV(Teich): 61 94 ml    CW  AR Dec Love: 3 09 m/s2  AR Dec Time: 1445 59 ms  AR PHT: 419 22 ms  AR Vmax: 4 47 m/s  AR maxP 75 mmHg  AV Env  Ti: 304 47 ms  AV MaxP 7 mmHg  AV VTI: 33 72 cm  AV Vmax: 1 78 m/s  AV Vmean: 1 11 m/s  AV meanP 58 mmHg  MR VTI: 204 77 cm  MR Vmax: 4 56 m/s  MR Vmean: 3 84 m/s  MR maxP 27 mmHg  MR meanP 52 mmHg  TR MaxP 73 mmHg  TR Vmax: 3 96 m/s    PW  LVOT Env  Ti: 374 88 ms  LVOT VTI: 24 53 cm  LVOT Vmax: 1 04 m/s  LVOT Vmean: 0 65 m/s  LVOT maxP 42 mmHg  LVOT meanP 05 mmHg  MV A Adithya: 0 16 m/s  MV Dec Love: 2 45 m/s2  MV DecT: 254 72 ms  MV E Adithya: 0 62 m/s  MV E/A Ratio: 3 99  MV PHT: 73 87 ms  MVA By PHT: 2 98 cm2    [address] [facility]    Prepared and electronically signed by    Bryanna Alvarado DO  Signed 15-Som-2021 12:34:36    No results found for this or any previous visit  Family History: Family history of Long QT syndrome     Historical Information   Past Medical History:   Diagnosis Date   • Anxiety    • BPH (benign prostatic hypertrophy)    • Cancer (HCC)     CLL   • CLL (chronic lymphocytic leukemia) ([facility] [address] )        • Colon polyp    • Concussion     Resolved: 16   • Depression    • Diverticulitis 2020 CT done  CT done    • E coli bacteremia 2021    2/ blood cultures with [facility]  Source appears to be the urine    • Epididymitis 2021   • Gastrointestinal hemorrhage     Last assessed: 13   • GERD (gastroesophageal reflux disease)    • H/O vitamin D deficiency 7/7/2021   • Hearing loss of aging    • Hiatal hernia    • History of right hip replacement 4/19/2021   • History of transfusion    • Hyperlipidemia    • Hypertension    • Hyponatremia 3/6/2023   • Iron deficiency anemia    • Microscopic hematuria     Last assessed: 06/28/13   • OA (osteoarthritis)     right hip   • Pneumothorax    • Primary osteoarthritis of right hip 9/29/2017    He is status post right hip arthroplasty   • Prostatitis    • Pulmonary hypertension (HCC)    • Seasonal allergies    • Urinary tract infection associated with catheterization of urinary tract ([facility] [address] ) 2/5/2021    Pseudomonal UTI asymptomatic  Per Urology do not treat at this time  • Urinary tract infection associated with catheterization of urinary tract ([facility] [address] ) 2/5/2021    Pseudomonal UTI asymptomatic  Per Urology do not treat at this time  He did have urosepsis from E coli 7/21   • UTI (urinary tract infection)     in past   • Wears glasses      Past Surgical History:   Procedure Laterality Date   • BLADDER SURGERY     • CARDIAC CATHETERIZATION Left 3/7/2023    Procedure: Cardiac Left Heart Cath;  Surgeon: Ania Pedraza MD;  Location: [facility] CARDIAC CATH LAB; Service: Cardiology   • CARDIAC CATHETERIZATION N/A 3/7/2023    Procedure: Cardiac temporary pacemaker;  Surgeon: Ania Pedraza MD;  Location: [facility]; Service: Cardiology   • CARDIAC ELECTROPHYSIOLOGY PROCEDURE N/A 3/8/2023    Procedure: Cardiac icd implant;  Surgeon: Maritza Hughes MD;  Location:  CARDIAC CATH LAB;   Service: Cardiology   • COLONOSCOPY     • CYSTOSCOPY  10/09/2014    Diagnostic   • CYSTOSCOPY  06/29/2020   • FL CYSTOGRAM  08/15/2022   • FRACTURE SURGERY      left lower arm   • FRACTURE SURGERY      left femur   • HERNIA REPAIR     • JOINT REPLACEMENT Right     hip   • OTHER SURGICAL HISTORY  03/2011    Spinal anesthesia epidural   • CA ARTHRP ACETBLR/PROX FEM PROSTC AGRFT/ALGRFT Right 2017    Procedure: ARTHROPLASTY HIP TOTAL ANTERIOR;  Surgeon: Dipti Cruz MD;  Location: [facility];  Service: Orthopedics   • TONSILLECTOMY AND ADENOIDECTOMY     • TRANSURETHRAL RESECTION OF PROSTATE      x 2   • WRIST SURGERY       Social History   Social History     Substance and Sexual Activity   Alcohol Use Yes   • Alcohol/week: 10 0 standard drinks   • Types: 10 Cans of beer per week     Social History     Substance and Sexual Activity   Drug Use Yes   • Types: Marijuana    Comment: "medical marijuana"     Social History     Tobacco Use   Smoking Status Former   • Packs/day: 1    • Years: 15 00   • Pack years: 15    • Types: Cigarettes   • Start date: 67   • Quit date: 1980   • Years since quittin 5   Smokeless Tobacco Never     Family History:   Family History   Problem Relation Age of Onset   • No Known Problems Mother    • Heart attack Father    • Diabetes Brother    • Prostate cancer Brother        Review of Systems:  Review of Systems   Unable to perform ROS: Other           Scheduled Meds:  Current Facility-Administered Medications   Medication Dose Route Frequency Provider Last Rate   • acetaminophen  650 mg Oral Q6H PRN MATHEW Evans     • aspirin  81 mg Oral Daily MATHEW Evans     • atorvastatin  20 mg Oral Daily With [address] CHONG BorgesNP     • folic acid  1 mg Oral Daily CHONG EvansNP     • gabapentin  100 mg Oral HS Cintia Aleah, DO     • HYDROmorphone  0 2 mg Intravenous Q4H PRN Cintia Aleah, DO     • lidocaine  1 patch Topical Daily Cintia Aleah, DO     • LORazepam  0 5 mg Oral Once Inderjit Casillas MD     • losartan  12 5 mg Oral Daily Nick Roldan, DO     • melatonin  6 mg Oral HS MATHEW Evans     • montelukast  10 mg Oral HS CHONG EvansNP     • multivitamin-minerals  1 tablet Oral Daily CHONG EvansNP     • nadolol  80 mg Oral Early Morning Noralyn Borders Tamra Wyman MD     • oxyCODONE  2 5 mg Oral Q4H PRN Ольга Diaz DO     • oxyCODONE  5 mg Oral Q4H PRN Ольга Diaz DO     • pantoprazole  40 mg Oral Early Morning MATHEW Ramirez     • polyethylene glycol  17 g Oral Daily PRN Callum Houser MD     • polyethylene glycol  17 g Oral BID Ольга Diaz DO     • [START ON 3/11/2023] rivaroxaban  20 mg Oral Daily With Breakfast Coolidge Landau, PA-C     • senna-docusate sodium  2 tablet Oral BID Ольгаmargaret Diaz, DO     • thiamine  100 mg Oral Daily MATHEW Ramirez       Continuous Infusions:   PRN Meds: •  acetaminophen  •  HYDROmorphone  •  oxyCODONE  •  oxyCODONE  •  polyethylene glycol  all current active meds have been reviewed    Allergies   Allergen Reactions   • Codeine Other (See Comments)     agitated   • Sulfamethoxazole-Trimethoprim GI Intolerance and Abdominal Pain     upset stomach       Objective   Vitals: Temp (24hrs), Av 9 °F (37 2 °C), Min:97 5 °F (36 4 °C), Max:99 7 °F (37 6 °C)  Current: Temperature: 99 °F (37 2 °C)  Patient Vitals for the past 24 hrs:   BP Temp Pulse Resp SpO2 Height Weight   03/10/23 1200 94/64 99 °F (37 2 °C) 68 18 95 % -- --   03/10/23 1100 109/73 99 3 °F (37 4 °C) 68 18 95 % -- --   03/10/23 1000 108/68 99 3 °F (37 4 °C) 68 (!) 27 96 % -- --   03/10/23 0900 101/84 98 6 °F (37 °C) (!) 118 22 94 % -- --   03/10/23 0800 117/81 97 5 °F (36 4 °C) (!) 118 16 95 % -- --   03/10/23 0600 124/80 98 6 °F (37 °C) (!) 128 17 94 % -- --   03/10/23 0539 126/85 98 2 °F (36 8 °C) (!) 128 22 97 % -- --   03/10/23 0537 126/85 -- (!) 119 -- -- -- --   03/10/23 0351 114/76 -- (!) 130 17 93 % -- --   03/10/23 0215 110/79 98 6 °F (37 °C) (!) 126 19 94 % -- --   23 2215 -- 99 °F (37 2 °C) (!) 126 19 94 % -- --   23 2130 110/85 -- (!) 126 (!) 24 93 % -- --   23 1819 112/83 -- (!) 120 19 92 % -- --   23 1604 132/83 99 7 °F (37 6 °C) (!) 122 20 97 % -- --   23 1600 -- 99 7 °F (37 6 °C) -- -- -- -- --   03/09/23 1345 121/84 -- (!) 122 -- -- 5' 8" (1 727 m) 90 3 kg (199 lb)    Body mass index is 30 26 kg/m²  Orthostatic Blood Pressures    Flowsheet Row Most Recent Value   Blood Pressure 94/64 filed at 03/10/2023 1200   Patient Position - Orthostatic VS Lying filed at 03/08/2023 0400              Invasive Devices     Peripheral Intravenous Line  Duration           Peripheral IV 03/06/23 Dorsal (posterior); Left Hand 4 days    Peripheral IV 03/06/23 Right Antecubital 4 days    Long-Dwell Peripheral IV (Midline) 03/07/23 Left 2 days          Drain  Duration           Urethral Catheter Temperature probe 16 Fr  3 days                Physical Exam:  Physical Exam  Constitutional:       Appearance: Normal appearance  Cardiovascular:      Rate and Rhythm: Normal rate and regular rhythm  Pulmonary:      Effort: Pulmonary effort is normal  No respiratory distress  Abdominal:      General: There is no distension  Musculoskeletal:      Right lower leg: No edema  Left lower leg: No edema  Skin:     General: Skin is warm  Neurological:      Mental Status: He is alert                 Lab Results:   Results from last 7 days   Lab Units 03/10/23  0541 03/09/23  0516 03/08/23 0427 03/07/23 2326 03/07/23  0605 03/06/23  1357   WBC Thousand/uL 28 90* 33 53* 15 53* 15 23* 26 79* 49 68*   HEMOGLOBIN g/dL 9 4* 9 5* 9 3* 9 1* 10 8* 13 2   HEMATOCRIT % 28 9* 28 7* 27 9* 27 4* 31 2* 38 6   PLATELETS Thousands/uL 167 168 116* 123* 163 228   MONO PCT % 3*  --   --   --   --  0*      Results from last 7 days   Lab Units 03/10/23  0541 03/09/23  0516 03/08/23 0427 03/07/23 2326 03/07/23  0605 03/06/23  1357   SODIUM mmol/L 132* 132* 133* 133* 130* 133*   POTASSIUM mmol/L 4 0 4 4 3 5 3 6 4 0 4 1   CHLORIDE mmol/L 100 104 102 101 95* 98   CO2 mmol/L 25 22 26 26 24 20*   BUN mg/dL 15 14 13 12 14 10   CREATININE mg/dL 0 81 0 71 0 67 0 71 0 91 0 96   CALCIUM mg/dL 8 6 8 4 8 0* 7 7* 8 4 9 4   ALK PHOS U/L  --   -- --   --  66 88   ALT U/L  --   --   --   --  30 39   AST U/L  --   --   --   --  31 49*   MAGNESIUM mg/dL 2 4 2 4 2 6 2 5 2 2 1 9   PHOSPHORUS mg/dL 2 9 3 1  --   --   --  5 1*   INR   --   --   --   --   --  1 25*   PTT seconds  --   --   --   --   --  26   EGFR ml/min/1 73sq m 90 95 97 95 85 79     Results from last 7 days   Lab Units 03/06/23  1357   INR  1 25*   PTT seconds 26     Results from last 7 days   Lab Units 03/06/23  1541   LACTIC ACID mmol/L 3 5*         No results found for: PHART, JCY2TSA, PO2ART, LMM2LGR, H9NODNSC, BEART, SOURCE  No components found for: HIV1X2  Lab Results   Component Value Date    HEPBIGM Non-reactive 01/10/2021    HEPBCAB Non-reactive 01/10/2021    HEPCAB Non-reactive 01/10/2021     No results found for: SPEP, UPEP   Lab Results   Component Value Date    HGBA1C 5 4 03/07/2023    HGBA1C 5 5 10/11/2022    HGBA1C 5 5 04/11/2022     Lab Results   Component Value Date    CHOL 144 01/20/2015    CHOL 146 07/01/2014    CHOL 151 01/10/2014      Lab Results   Component Value Date    HDL 40 03/07/2023    HDL 37 (L) 10/11/2022    HDL 37 (L) 04/11/2022      Lab Results   Component Value Date    LDLCALC 50 03/07/2023    LDLCALC 75 10/11/2022    LDLCALC 64 04/11/2022      Lab Results   Component Value Date    TRIG 87 03/07/2023    TRIG 110 10/11/2022    TRIG 138 04/11/2022     No components found for: PROCAL  Results from last 7 days   Lab Units 03/06/23  1357   BNP pg/mL 565*         Imaging: I have personally reviewed pertinent reports

## 2023-03-10 NOTE — ASSESSMENT & PLAN NOTE
Pt presented with a low grade fever, leukocytosis, and >100k cfu of ecoli on urine culture  Pt completed a course of ceftriaxone

## 2023-03-11 LAB
ANION GAP SERPL CALCULATED.3IONS-SCNC: 6 MMOL/L (ref 4–13)
BACTERIA BLD CULT: NORMAL
BACTERIA BLD CULT: NORMAL
BUN SERPL-MCNC: 10 MG/DL (ref 5–25)
CALCIUM SERPL-MCNC: 8.5 MG/DL (ref 8.3–10.1)
CHLORIDE SERPL-SCNC: 104 MMOL/L (ref 96–108)
CO2 SERPL-SCNC: 24 MMOL/L (ref 21–32)
CREAT SERPL-MCNC: 0.69 MG/DL (ref 0.6–1.3)
ERYTHROCYTE [DISTWIDTH] IN BLOOD BY AUTOMATED COUNT: 14.6 % (ref 11.6–15.1)
GFR SERPL CREATININE-BSD FRML MDRD: 96 ML/MIN/1.73SQ M
GLUCOSE SERPL-MCNC: 129 MG/DL (ref 65–140)
HCT VFR BLD AUTO: 27 % (ref 36.5–49.3)
HGB BLD-MCNC: 8.7 G/DL (ref 12–17)
MCH RBC QN AUTO: 35.4 PG (ref 26.8–34.3)
MCHC RBC AUTO-ENTMCNC: 32.2 G/DL (ref 31.4–37.4)
MCV RBC AUTO: 110 FL (ref 82–98)
NRBC BLD AUTO-RTO: 0 /100 WBCS
PLATELET # BLD AUTO: 130 THOUSANDS/UL (ref 149–390)
PMV BLD AUTO: 10.2 FL (ref 8.9–12.7)
POTASSIUM SERPL-SCNC: 3.9 MMOL/L (ref 3.5–5.3)
RBC # BLD AUTO: 2.46 MILLION/UL (ref 3.88–5.62)
SODIUM SERPL-SCNC: 134 MMOL/L (ref 135–147)
WBC # BLD AUTO: 22.48 THOUSAND/UL (ref 4.31–10.16)

## 2023-03-11 RX ORDER — HYDROXYZINE HYDROCHLORIDE 25 MG/1
25 TABLET, FILM COATED ORAL EVERY 6 HOURS PRN
Status: DISCONTINUED | OUTPATIENT
Start: 2023-03-11 | End: 2023-03-11

## 2023-03-11 RX ORDER — LOSARTAN POTASSIUM 25 MG/1
25 TABLET ORAL DAILY
Status: DISCONTINUED | OUTPATIENT
Start: 2023-03-11 | End: 2023-03-13 | Stop reason: HOSPADM

## 2023-03-11 RX ORDER — LORAZEPAM 0.5 MG/1
0.5 TABLET ORAL ONCE
Status: COMPLETED | OUTPATIENT
Start: 2023-03-11 | End: 2023-03-11

## 2023-03-11 RX ADMIN — LORAZEPAM 0.5 MG: 0.5 TABLET ORAL at 20:58

## 2023-03-11 RX ADMIN — GABAPENTIN 100 MG: 100 CAPSULE ORAL at 21:00

## 2023-03-11 RX ADMIN — OXYCODONE HYDROCHLORIDE 5 MG: 5 SOLUTION ORAL at 23:32

## 2023-03-11 RX ADMIN — FOLIC ACID 1 MG: 1 TABLET ORAL at 08:49

## 2023-03-11 RX ADMIN — MELATONIN 6 MG: at 20:58

## 2023-03-11 RX ADMIN — LOSARTAN POTASSIUM 25 MG: 25 TABLET, FILM COATED ORAL at 08:49

## 2023-03-11 RX ADMIN — SIMETHICONE 80 MG: 80 TABLET, CHEWABLE ORAL at 20:32

## 2023-03-11 RX ADMIN — Medication 1 TABLET: at 08:49

## 2023-03-11 RX ADMIN — RIVAROXABAN 20 MG: 20 TABLET, FILM COATED ORAL at 08:50

## 2023-03-11 RX ADMIN — ASPIRIN 81 MG: 81 TABLET, CHEWABLE ORAL at 08:49

## 2023-03-11 RX ADMIN — PANTOPRAZOLE SODIUM 40 MG: 40 TABLET, DELAYED RELEASE ORAL at 06:30

## 2023-03-11 RX ADMIN — POLYETHYLENE GLYCOL 3350 17 G: 17 POWDER, FOR SOLUTION ORAL at 20:32

## 2023-03-11 RX ADMIN — HYDROXYZINE HYDROCHLORIDE 25 MG: 25 TABLET, FILM COATED ORAL at 15:41

## 2023-03-11 RX ADMIN — NADOLOL 80 MG: 40 TABLET ORAL at 06:30

## 2023-03-11 RX ADMIN — THIAMINE HCL TAB 100 MG 100 MG: 100 TAB at 08:49

## 2023-03-11 RX ADMIN — ATORVASTATIN CALCIUM 20 MG: 20 TABLET, FILM COATED ORAL at 15:42

## 2023-03-11 NOTE — PROGRESS NOTES
Daily Progress Note - Critical Care   Marsha Calderón 81 y o  male MRN: [id number]  Unit/Bed#: MICU 05 Encounter: [id number]        ----------------------------------------------------------------------------------------  HPI: Felxi Armenta a 81 y  o  male who presents with PMH of paroxysmal afib, HTN, long QT, alcohol use, marijuana use, dementia and CLL  Pt initially presented to [facility] ED 3/6 for loss of consciousness  Per the pt's wife, they were watching a movie when the pt told his wife that he was not feeling well and immediately lost consciousness  EMS arrived and found the patient to be unresponsive  The pt was found to be in V-fib and patient was shocked x1 and underwent CPR  ROSC achieved in the field  Upon arrival to the ED, the pt was found to be breathing spontaneously, alert, awake, but lethargic  Pt had similar episodes last year with concern of possible seizure activity  EKG showed A-fib with RVR  Pt denies chest pain, fevers, chills, nausea, or vomiting at this time       Cardiology was consulted and reviewed the ECG which revealed a regular narrow complex tachycardia suggesting SVT  Pt was then given adenosine  Pt was found to have elevated troponin, a low grade fever and elevated WBC showing evidence of sepsis  Urinalysis showed WBC and innumerable bacteria  Pt was started on ceftriaxone  Neurology was consulted for evaluation of his loss of consciousness and planned for CTA head/neck to exclude basilar/other critical vessel abnormalities that could lead to LOC/hypoperfusion and syncope  Otherwise neurology workup was unremarkable       On 3/7 pt was receiving an echo when the pt developed v fib arrest and went into torsades with QT of 671 ms  Started on lidocaine drip  Pt came out of arrhythmia prior to defibrillation  The echo showed apex hypokinesis which is new prior to echo   Pt received cardiac cath in [facility], had temporary pacer placed for recurrent torsades and has been transferred to [facility] with plans for ICD placement       24hr events: No acute events overnight  Tolerating diet, BM yesterday       ---------------------------------------------------------------------------------------  SUBJECTIVE  Pt seen and examined, lying in bed comfortably  Review of Systems   Constitutional: Negative for chills, diaphoresis and fever  HENT: Negative for sinus pressure, sinus pain and tinnitus  Eyes: Negative for photophobia and redness  Respiratory: Negative for cough, chest tightness and shortness of breath  Cardiovascular: Negative for chest pain  Gastrointestinal: Negative for abdominal distention and abdominal pain  Genitourinary: Negative for flank pain and hematuria  Skin: Negative for rash  Neurological: Negative for dizziness and headaches  All other systems reviewed and are negative      Review of systems was reviewed and negative unless stated above in HPI/24-hour events   ---------------------------------------------------------------------------------------  Assessment and Plan:    Neuro:   • Diagnosis: syncope   o Plan: likely 2/2 arrhythmia; torsades/VT/afib  o Neuro w/u negative  o Head imaging unremarkable  • Diagnosis: Hx of dementia   o Plan:   - ICU delirium precautions  • Pain control  • Bowel maintenance  • Optimize sleep/wake cycle  • Frequent orientation      CV:   • Diagnosis: Torsades with QT on ECG of 671   o Plan: Continue nadolol  o Avoid QT prolonging meds   o Started xarelto 3/10  • Diagnosis: S/P cath  o no flow-significant atherosclerotic lesions were identified; temporary pacing wire placed for recurrent torsades during the procedure  - Continue ASA  • Diagnosis: HTN  o Added losartan 3/10  • Diagnosis: Paroxysmal afib  o Continue nadalol per cards  o S/p ICD  o EP adjusted pacer setting to avoid persistent tachycardia  • Diagnosis: HLD  o Continue home atorvastatin       Pulm:  • Diagnosis: no acute concerns  • Maintain sats >92%      GI:   • Diagnosis: GERD  o Plan: continue home protonix   • Tolerating regular diet  • Current bowel reg: miralax, senokot      :   • Diagnosis: UTI   o Plan: SIRS criteria,culture >100k GNR   o Completed abx   • Denies dysuria, frequency, urgency      F/E/N:   • Fluids: TKO  • Electrolyte: replete PRN  • Nutrition: cardiac diet       Heme/Onc:   • Diagnosis: Hx of CLL  o Plan: managed outpatient   o Baseline WBC of 14k-18k   • Diagnosis: macrocytic anemia- MCV of 108   o Continue to monitor  o Trend CBC as needed      Endo:   • Diagnosis: no acute concerns      ID:   • Diagnosis: Sepsis  o Plan:   - Short course of antibiotics completed  - Ceftriaxone x3 days      MSK/Skin:   • Diagnosis: no acute concerns   • PT/OT when able  • Monitor skin integrity    Patient appropriate for transfer out of the ICU today?: Patient does not meet criteria for ICU Follow-up Clinic; referral has not been made  Disposition: Transfer to Med Surg with Telemetry   Code Status: Level 1 - Full Code  ---------------------------------------------------------------------------------------  ICU CORE MEASURES    Prophylaxis   VTE Pharmacologic Prophylaxis: Enoxaparin (Lovenox)  VTE Mechanical Prophylaxis: sequential compression device  Stress Ulcer Prophylaxis: Pantoprazole PO    ABCDE Protocol (if indicated)  Plan to perform spontaneous awakening trial today? Not applicable  Plan to perform spontaneous breathing trial today? Not applicable  Obvious barriers to extubation? Not applicable  CAM-ICU: Negative    Invasive Devices Review  Invasive Devices     Peripheral Intravenous Line  Duration           Peripheral IV 03/06/23 Dorsal (posterior); Left Hand 5 days    Long-Dwell Peripheral IV (Midline) 03/07/23 Left 3 days          Drain  Duration           Urethral Catheter Temperature probe 16 Fr  4 days              Can any invasive devices be discontinued today?  Not applicable  ---------------------------------------------------------------------------------------  OBJECTIVE    Vitals   Vitals:    03/10/23 1800 03/10/23 2100 03/10/23 2200 03/10/23 2300   BP: 118/86 138/79 118/76 112/72   BP Location:       Pulse: 82 82 78 94   Resp:  17   Temp: 99 7 °F (37 6 °C) 98 6 °F (37 °C) 98 2 °F (36 8 °C) 97 5 °F (36 4 °C)   TempSrc:       SpO2: 96% 96% 94% 95%   Weight:       Height:         Temp (24hrs), Av 5 °F (36 9 °C), Min:97 5 °F (36 4 °C), Max:99 7 °F (37 6 °C)  Current: Temperature: 97 5 °F (36 4 °C)    Respiratory:  SpO2: SpO2: 95 %, SpO2 Activity: SpO2 Activity: At Rest, SpO2 Device: O2 Device: None (Room air), Capnography: Capnography: No  Nasal Cannula O2 Flow Rate (L/min): 2 L/min    Invasive/non-invasive ventilation settings   Respiratory    Lab Data (Last 4 hours)    None         O2/Vent Data (Last 4 hours)    None                Physical Exam  Vitals reviewed  Constitutional:       General: He is not in acute distress  Appearance: Normal appearance  He is normal weight  He is not toxic-appearing  HENT:      Head: Normocephalic and atraumatic  Right Ear: External ear normal       Left Ear: External ear normal       Nose: Nose normal  No rhinorrhea  Mouth/Throat:      Mouth: Mucous membranes are moist       Pharynx: Oropharynx is clear  No posterior oropharyngeal erythema  Eyes:      Extraocular Movements: Extraocular movements intact  Conjunctiva/sclera: Conjunctivae normal    Cardiovascular:      Rate and Rhythm: Normal rate and regular rhythm  Pulses: Normal pulses  Heart sounds: Normal heart sounds  Pulmonary:      Effort: Pulmonary effort is normal       Breath sounds: Normal breath sounds  Abdominal:      General: Bowel sounds are normal  There is distension  Palpations: Abdomen is soft  Tenderness: There is no abdominal tenderness  There is no guarding     Genitourinary:     Comments: Hillman in place  Musculoskeletal:      Right lower leg: No edema  Left lower leg: No edema  Skin:     General: Skin is warm and dry  Neurological:      General: No focal deficit present  Mental Status: He is alert and oriented to person, place, and time  Sensory: No sensory deficit  Motor: No weakness        Comments: Baseline dementia but lucid during exam    Psychiatric:         Mood and Affect: Mood normal          Behavior: Behavior normal                Laboratory and Diagnostics:  Results from last 7 days   Lab Units 03/10/23  0541 03/09/23  0516 03/08/23 0427 03/07/23 2326 03/07/23  0605 03/06/23  1357   WBC Thousand/uL 28 90* 33 53* 15 53* 15 23* 26 79* 49 68*   HEMOGLOBIN g/dL 9 4* 9 5* 9 3* 9 1* 10 8* 13 2   HEMATOCRIT % 28 9* 28 7* 27 9* 27 4* 31 2* 38 6   PLATELETS Thousands/uL 167 168 116* 123* 163 228   BANDS PCT % 1  --   --   --   --  8   MONO PCT % 3*  --   --   --   --  0*     Results from last 7 days   Lab Units 03/10/23  0541 03/09/23  0516 03/08/23 0427 03/07/23 2326 03/07/23  0605 03/06/23  1357   SODIUM mmol/L 132* 132* 133* 133* 130* 133*   POTASSIUM mmol/L 4 0 4 4 3 5 3 6 4 0 4 1   CHLORIDE mmol/L 100 104 102 101 95* 98   CO2 mmol/L 25 22 26 26 24 20*   ANION GAP mmol/L 7 6 5 6 11 15*   BUN mg/dL 15 14 13 12 14 10   CREATININE mg/dL 0 81 0 71 0 67 0 71 0 91 0 96   CALCIUM mg/dL 8 6 8 4 8 0* 7 7* 8 4 9 4   GLUCOSE RANDOM mg/dL 115 117 115 131 135 234*   ALT U/L  --   --   --   --  30 39   AST U/L  --   --   --   --  31 49*   ALK PHOS U/L  --   --   --   --  66 88   ALBUMIN g/dL  --   --   --   --  4 4 4 9   TOTAL BILIRUBIN mg/dL  --   --   --   --  0 92 1 01*     Results from last 7 days   Lab Units 03/10/23  0541 03/09/23  0516 03/08/23 0427 03/07/23  2326 03/07/23  0605 03/06/23  1357   MAGNESIUM mg/dL 2 4 2 4 2 6 2 5 2 2 1 9   PHOSPHORUS mg/dL 2 9 3 1  --   --   --  5 1*      Results from last 7 days   Lab Units 03/06/23  1357   INR  1 25*   PTT seconds 26 Results from last 7 days   Lab Units 03/10/23  1848 03/06/23  1541 03/06/23  1357   LACTIC ACID mmol/L 1 8 3 5* 5 4*     ABG:    VBG:    Results from last 7 days   Lab Units 03/06/23  1357   PROCALCITONIN ng/ml 0 05       Micro  Results from last 7 days   Lab Units 03/06/23  1424 03/06/23  1400 03/06/23  1357   BLOOD CULTURE   --  No Growth After 4 Days  No Growth After 4 Days  URINE CULTURE  >100,000 cfu/ml Escherichia coli*  --   --        EKG: no new ekg  Imaging:  I have personally reviewed pertinent reports  Intake and Output  I/O       03/08 0701 03/09 0700 03/09 0701  03/10 0700    P  O   340    I V  (mL/kg) 1096 5 (12 1) 75 4 (0 8)    IV Piggyback 100     Total Intake(mL/kg) 1196 5 (13 2) 415 4 (4 6)    Urine (mL/kg/hr) 430 (0 2) 1350 (0 6)    Blood 30     Total Output 460 1350    Net +736 5 -934 6                Height and Weights   Height: 5' 8" (172 7 cm)  IBW (Ideal Body Weight): 68 4 kg  Body mass index is 30 26 kg/m²  Weight (last 2 days)     Date/Time Weight    03/09/23 1345 90 3 (199)            Nutrition       Diet Orders   (From admission, onward)             Start     Ordered    03/08/23 2050  Diet Cardiovascular; Cardiac  Diet effective midnight        References:    Nutrtion Support Algorithm Enteral vs  Parenteral   Question Answer Comment   Diet Type Cardiovascular    Cardiac Cardiac    RD to adjust diet per protocol?  Yes        03/08/23 2049                  Active Medications  Scheduled Meds:  Current Facility-Administered Medications   Medication Dose Route Frequency Provider Last Rate   • acetaminophen  650 mg Oral Q6H PRN Nain Peels Jony, CRNP     • aspirin  81 mg Oral Daily Nain CHONG OwusuNP     • atorvastatin  20 mg Oral Daily With [address] Sonday, CRNP     • folic acid  1 mg Oral Daily Nain Perobert Borges, CRNP     • gabapentin  100 mg Oral HS Lennox Adams, DO     • HYDROmorphone  0 2 mg Intravenous Q4H PRN Lennox Adams, DO     • lidocaine 1 patch Topical Daily Loral Hang, DO     • LORazepam  0 5 mg Oral Once Nilesh Vieira MD     • losartan  12 5 mg Oral Daily Patricia Cox, DO     • melatonin  6 mg Oral HS Jose Brooklynner Jameyday, MATHEW     • montelukast  10 mg Oral HS Jose Stalker CHONG FamNP     • multivitamin-minerals  1 tablet Oral Daily Gerhard Stalker Sonday, CRNP     • nadolol  80 mg Oral Early Morning Leandro Gosselin, MD     • oxyCODONE  2 5 mg Oral Q4H PRN Loral Hang, DO     • oxyCODONE  5 mg Oral Q4H PRN Loral Hang, DO     • pantoprazole  40 mg Oral Early Morning Jose Stalker Sonday, CRNP     • polyethylene glycol  17 g Oral Daily PRN Nilesh Vieira MD     • polyethylene glycol  17 g Oral BID Loral Hang, DO     • rivaroxaban  20 mg Oral Daily With Breakfast Maranda Ni PA-C     • senna-docusate sodium  2 tablet Oral BID Loral Hang, DO     • simethicone  80 mg Oral Q6H PRN Loral Hang, DO     • thiamine  100 mg Oral Daily Gerhard Stalker Sonday, CRNP       Continuous Infusions:     PRN Meds:   acetaminophen, 650 mg, Q6H PRN  HYDROmorphone, 0 2 mg, Q4H PRN  oxyCODONE, 2 5 mg, Q4H PRN  oxyCODONE, 5 mg, Q4H PRN  polyethylene glycol, 17 g, Daily PRN  simethicone, 80 mg, Q6H PRN        Allergies   Allergies   Allergen Reactions   • Codeine Other (See Comments)     agitated   • Sulfamethoxazole-Trimethoprim GI Intolerance and Abdominal Pain     upset stomach     ---------------------------------------------------------------------------------------  Advance Directive and Living Will: Yes    Power of :    POLST:    ---------------------------------------------------------------------------------------    Nilesh Vieira MD

## 2023-03-11 NOTE — PROGRESS NOTES
Heart Failure/ Pulmonary Hypertension Progress Note - Conrado Cantu 81 y o  male MRN: [id number]    Unit/Bed#: MICU 05 Encounter: [id number]      Assessment:    Active Problems:    Essential hypertension    CLL (chronic lymphocytic leukemia) (HCC)    Esophageal reflux    Hypercholesterolemia    Alcohol use    Sepsis ([address] )    Ventricular fibrillation (HCC)    Paroxysmal A-fib (HCC)    Prolonged QT interval    Long QT syndrome type 2    Loss of consciousness (HCC)      Assessment/Plan:     New heart failure with reduced ejection fraction  Etiology: most likely biventricular stress-induced cardiomyopathy in setting of VF arrest, diffuse mid to apical hypokinesis on echo  No obstructive disease on [facility]      Studies- personally reviewed by me     Echocardiogram 3/9/23  LVEF: 30%, diffuse mid to apical hypokinesis  LVIDd:  RV: upper limits of normal size, mid to apical severe hypokinesis of the RV free wall  MR: trace  PASP: 56mmHg, mild TR, peak TR velocity 3m/sec, estimated RAP 20mmHg  RVOT: late notching  Other: mild AI, mildly dilated ascending aorta     LHC 3/7/23: proximal to mid LAD 40% stenosis, otherwise normal coronaries     TTE 3/7/23: LVEF 40%  TTE 6/23/22: LVEF 60%  Moderate concentric LVH  Mildly dilated RV, normal systolic function  Moderate AI  Trace MR  Moderate TR, estimated RVSP 55-60mmHg  Midly dilated ascending aorta at 4 2cm     Neurohormonal Blockade:  --Beta-Blocker: nadolol 80mg daily for VF  --ACEi, ARB or ARNi:  --Aldosterone Receptor Blocker:  --SGLT2 Inhibitor:  --Diuretic:     Sudden Cardiac Death Risk Reduction:  --ICD: MDT "BIV" ICD implant 3/8/23 (secondary prevention)     Electrical Resynchronization:  --Candidacy for BiV device:      Advanced Therapies (if appropriate):   --Inotrope:  --LVAD/Transplant Candidacy:      Long QT 2 syndrome  Family history of this (grandson diagnosed with LQT2 at [address]), has reported history of seizure disorder  Outpatient beta blocker of metoprolol changed to nadolol  Vfib arrest x 2  Likely precipitated by QT prolonging SSRI and antibiotics  S/p lidocaine and TVP for overdrive pacing  S/p ICD implant - was to undergo dual chamber device but developed complete heart block intraop; LV lead attempted to be placed but no good targets; LPF lead placed with narrow QRS complex, appearing wider POD1 - EP optimizing device settings  Paroxysmal atrial fibrillation/flutter  Rate: betablocker as above  AC: xarelto  Pulmonary hypertension  Likely group II disease with diastolic dysfunction and group III disease with mild restriction, untreated sleep apnea  Nonobstructive CAD  Dilated ascending aorta  CLL  Alcohol and marijuana use  UTI     Today's Plan:  Increase losartan to 25mg daily  To continue nadolol for long QT  Monitor volume status  Reassess EF after 3 months of GDMT       Subjective:   Patient seen and examined  No significant events overnight  Review of Systems   Constitutional: Negative for chills and fever  Respiratory: Negative for chest tightness and shortness of breath  Cardiovascular: Negative for chest pain, palpitations and leg swelling  Gastrointestinal: Negative for abdominal distention, abdominal pain, nausea and vomiting  Neurological: Negative for dizziness and light-headedness  Objective: Intake/ Output: 480/675  Weight: 199 lbs bedscale  Tele: v-paced      Vitals: Blood pressure 112/72, pulse 94, temperature 97 5 °F (36 4 °C), resp  rate 17, height 5' 8" (1 727 m), weight 90 3 kg (199 lb), SpO2 95 %  , Body mass index is 30 26 kg/m² , I/O last 3 completed shifts: In: 480 [P O :480]  Out: 1525 [Urine:1525]  No intake/output data recorded    Wt Readings from Last 3 Encounters:   03/09/23 90 3 kg (199 lb)   03/07/23 88 9 kg (196 lb)   03/02/23 88 9 kg (196 lb)       Intake/Output Summary (Last 24 hours) at 3/11/2023 0801  Last data filed at 3/10/2023 2101  Gross per 24 hour   Intake 360 ml   Output 550 ml   Net -190 ml     I/O last 3 completed shifts: In: 480 [P O :480]  Out: 1525 [Urine:1525]    No significant arrhythmias seen on telemetry review         Physical Exam:  Vitals:    03/10/23 1800 03/10/23 2100 03/10/23 2200 03/10/23 2300   BP: 118/86 138/79 118/76 112/72   BP Location:       Pulse: 82 82 78 94   Resp: 22 22 19 17   Temp: 99 7 °F (37 6 °C) 98 6 °F (37 °C) 98 2 °F (36 8 °C) 97 5 °F (36 4 °C)   TempSrc:       SpO2: 96% 96% 94% 95%   Weight:       Height:           GEN: Murphy Willams appears well, alert and oriented x 3, pleasant and cooperative   HEENT: NC/AT, moist mucosa, anicteric sclerae; extraocular muscles intact  NECK: supple, no carotid bruits   HEART: regular rhythm, normal S1 and S2, no murmurs, clicks, gallops or rubs, JVP is mildly elevated   LUNGS: clear to auscultation bilaterally; no wheezes, rales, or rhonchi   ABDOMEN: normal bowel sounds, soft, no tenderness, no distention  EXTREMITIES: peripheral pulses normal; no clubbing, cyanosis, or edema  NEURO: no focal findings   SKIN: normal without suspicious lesions on exposed skin      Current Facility-Administered Medications:   •  acetaminophen (TYLENOL) tablet 650 mg, 650 mg, Oral, Q6H PRN, Jose Borges, CRNP, 650 mg at 03/10/23 2111  •  aspirin chewable tablet 81 mg, 81 mg, Oral, Daily, Jose Borges, CRNP, 81 mg at 03/10/23 2140  •  atorvastatin (LIPITOR) tablet 20 mg, 20 mg, Oral, Daily With Jayshree Borges, CRNP, 20 mg at 85/03/78 2145  •  folic acid (FOLVITE) tablet 1 mg, 1 mg, Oral, Daily, Jose Borges, CRNP, 1 mg at 03/10/23 1812  •  gabapentin (NEURONTIN) capsule 100 mg, 100 mg, Oral, HS, Alan Doan, DO, 100 mg at 03/10/23 2111  •  lidocaine (LIDODERM) 5 % patch 1 patch, 1 patch, Topical, Daily, Loral Hang, DO  •  losartan (COZAAR) tablet 12 5 mg, 12 5 mg, Oral, Daily, Patricia Cox DO, 12 5 mg at 03/10/23 1429  •  melatonin tablet 6 mg, 6 mg, Oral, HS, MATHEW Galeas, 6 mg at 03/10/23 2002  •  montelukast (SINGULAIR) tablet 10 mg, 10 mg, Oral, HS, Vivian Borges, CRNP, 10 mg at 03/10/23 2111  •  multivitamin-minerals (CENTRUM) tablet 1 tablet, 1 tablet, Oral, Daily, Vivian Borges, CRNP, 1 tablet at 03/10/23 0850  •  nadolol (CORGARD) tablet 80 mg, 80 mg, Oral, Early Morning, Aidan Kincaid MD, 80 mg at 03/11/23 0630  •  oxyCODONE (ROXICODONE) oral solution 2 5 mg, 2 5 mg, Oral, Q4H PRN, Gerhardt Sella, DO, 2 5 mg at 03/10/23 2002  •  oxyCODONE (ROXICODONE) oral solution 5 mg, 5 mg, Oral, Q4H PRN, Gerhardt Sella, DO, 5 mg at 03/09/23 2156  •  pantoprazole (PROTONIX) EC tablet 40 mg, 40 mg, Oral, Early Morning, Vivian Borges, CRNP, 40 mg at 03/11/23 0630  •  polyethylene glycol (MIRALAX) packet 17 g, 17 g, Oral, BID, Alangael Doan, DO, 17 g at 03/09/23 2112  •  rivaroxaban (XARELTO) tablet 20 mg, 20 mg, Oral, Daily With Breakfast, Mendel Bye, PA-C  •  senna-docusate sodium (SENOKOT S) 8 6-50 mg per tablet 2 tablet, 2 tablet, Oral, BID, Gerhardt Sella, DO, 2 tablet at 03/10/23 1497  •  simethicone (MYLICON) chewable tablet 80 mg, 80 mg, Oral, Q6H PRN, Gerhardt Sella, DO, 80 mg at 03/10/23 1754  •  thiamine tablet 100 mg, 100 mg, Oral, Daily, Vivian Borges, CRNP, 100 mg at 03/10/23 0858      Labs & Results:    Results from last 7 days   Lab Units 03/06/23  1357   CK TOTAL U/L 38*     Results from last 7 days   Lab Units 03/11/23  0635 03/10/23  0541 03/09/23  0516   WBC Thousand/uL 22 48* 28 90* 33 53*   HEMOGLOBIN g/dL 8 7* 9 4* 9 5*   HEMATOCRIT % 27 0* 28 9* 28 7*   PLATELETS Thousands/uL 130* 167 168         Results from last 7 days   Lab Units 03/11/23  0635 03/10/23  0541 03/09/23  0516 03/07/23  2326 03/07/23  0605 03/06/23  1357   POTASSIUM mmol/L 3 9 4 0 4 4   < > 4 0 4 1   CHLORIDE mmol/L 104 100 104   < > 95* 98   CO2 mmol/L 24 25 22   < > 24 20*   BUN mg/dL 10 15 14   < > 14 10   CREATININE mg/dL 0 69 0 81 0 71   < > 0 91 0 96   CALCIUM mg/dL 8 5 8 6 8 4   < > 8 4 9 4   ALK PHOS U/L  --   --   --   --  66 88   ALT U/L  --   --   --   --  30 39   AST U/L  --   --   --   --  31 49*    < > = values in this interval not displayed       Results from last 7 days   Lab Units 03/06/23  1357   INR  1 25*           Ashleigh Rodriguez MD  Advanced Heart Failure and Mechanical Circulatory   [facility]

## 2023-03-12 PROBLEM — I50.9 NEW ONSET OF CONGESTIVE HEART FAILURE (HCC): Status: ACTIVE | Noted: 2023-03-12

## 2023-03-12 RX ORDER — SPIRONOLACTONE 25 MG/1
25 TABLET ORAL DAILY
Status: DISCONTINUED | OUTPATIENT
Start: 2023-03-12 | End: 2023-03-13 | Stop reason: HOSPADM

## 2023-03-12 RX ORDER — LORAZEPAM 1 MG/1
1 TABLET ORAL ONCE
Status: COMPLETED | OUTPATIENT
Start: 2023-03-12 | End: 2023-03-12

## 2023-03-12 RX ADMIN — POLYETHYLENE GLYCOL 3350 17 G: 17 POWDER, FOR SOLUTION ORAL at 08:19

## 2023-03-12 RX ADMIN — OXYCODONE HYDROCHLORIDE 5 MG: 5 SOLUTION ORAL at 20:37

## 2023-03-12 RX ADMIN — SIMETHICONE 80 MG: 80 TABLET, CHEWABLE ORAL at 22:37

## 2023-03-12 RX ADMIN — SENNOSIDES AND DOCUSATE SODIUM 2 TABLET: 8.6; 5 TABLET ORAL at 08:19

## 2023-03-12 RX ADMIN — ASPIRIN 81 MG: 81 TABLET, CHEWABLE ORAL at 08:19

## 2023-03-12 RX ADMIN — LOSARTAN POTASSIUM 25 MG: 25 TABLET, FILM COATED ORAL at 08:19

## 2023-03-12 RX ADMIN — THIAMINE HCL TAB 100 MG 100 MG: 100 TAB at 08:19

## 2023-03-12 RX ADMIN — MONTELUKAST 10 MG: 10 TABLET, FILM COATED ORAL at 21:45

## 2023-03-12 RX ADMIN — LORAZEPAM 1 MG: 1 TABLET ORAL at 22:18

## 2023-03-12 RX ADMIN — ATORVASTATIN CALCIUM 20 MG: 20 TABLET, FILM COATED ORAL at 17:14

## 2023-03-12 RX ADMIN — SPIRONOLACTONE 25 MG: 25 TABLET ORAL at 12:29

## 2023-03-12 RX ADMIN — FOLIC ACID 1 MG: 1 TABLET ORAL at 08:19

## 2023-03-12 RX ADMIN — RIVAROXABAN 20 MG: 20 TABLET, FILM COATED ORAL at 08:19

## 2023-03-12 RX ADMIN — NADOLOL 80 MG: 40 TABLET ORAL at 06:25

## 2023-03-12 RX ADMIN — MELATONIN 6 MG: at 20:37

## 2023-03-12 RX ADMIN — GABAPENTIN 100 MG: 100 CAPSULE ORAL at 21:45

## 2023-03-12 RX ADMIN — POLYETHYLENE GLYCOL 3350 17 G: 17 POWDER, FOR SOLUTION ORAL at 20:38

## 2023-03-12 RX ADMIN — SENNOSIDES AND DOCUSATE SODIUM 2 TABLET: 8.6; 5 TABLET ORAL at 17:14

## 2023-03-12 RX ADMIN — ACETAMINOPHEN 650 MG: 325 TABLET ORAL at 04:01

## 2023-03-12 RX ADMIN — Medication 1 TABLET: at 08:19

## 2023-03-12 RX ADMIN — PANTOPRAZOLE SODIUM 40 MG: 40 TABLET, DELAYED RELEASE ORAL at 06:25

## 2023-03-12 NOTE — PROGRESS NOTES
Patient currently is doing well  No significant complaints        Device site looks well  Bilateral air entry reduced  Has a Hillman in place  Awake and oriented      Telemetry  Patient is ventricular paced      PLAN  1) Cont nadolol  2) Start xarelto for afib/flutter  3) Avoid QT prolongating medications, keep K >4 0, Mag >2 0  4) Repeat echo in 3 months see if EF improved, maybe stunning post arrest  5) follow-up with EP as outpatient  6) Patient did not have genetic testing for his long QT and would be preferable to identify the subtype -Family has long QT type II  7) keep pacing rate around 70/min

## 2023-03-12 NOTE — RESTORATIVE TECHNICIAN NOTE
Restorative Technician Note      Patient Name: Amauri Wynne     Restorative Tech Visit Date: 03/12/23  Note Type: Mobility  Patient Position Upon Consult: Bedside chair  Activity Performed: Ambulated  Assistive Device: Roller walker; Other (Comment) (and a short distance without the roller walker)  Patient Position at End of Consult: All needs within reach;  Bedside chair

## 2023-03-12 NOTE — PROGRESS NOTES
[address] [facility]  Progress Note Chava Police 1950, 81 y o  male MRN: [id number]  Unit/Bed#: [facility] 421-01 Encounter: [id number]  Primary Care Provider: Jak Blount MD   Date and time admitted to hospital: 3/7/2023  5:53 PM    Loss of consciousness [facility])  Assessment & Plan  Pt was watching a movie with his wife when he told his wife that he was not feeling well and immediately lost consciousness  EMS arrived and found the pt unresponsive  The pt was found to be in V-fib and the pt was shocked x1 and underwent CPR  ROSC achieved in the field  Upon arrival in the Ed, the pt was found to be breathing spontaneously, alert, awake, but lethargic  Pt was worked up by neurology, was unremarkable   Pt did not have any focal neurologic deficits, strengths were 5/5 in upper and lower extremities bilaterally  No occlusion or thrombosis  Likely precipitated by QT prolonging SSRI and antibiotics     * Ventricular fibrillation ([facility] [address] )  Assessment & Plan  Pt was found to be in vfib in the field and received shock x1 and CPR  ROSC was achieved in the field  In SLA pt had an episode of torsades during an echo procedure requiring a temporary pacer  Per EP likely precipitated by QT prolongation/SSRI and antibiotic  No obstructive CAD seen on cardiac cath 3/7  Status post ICD placement on 3/8  EP is following  Continue nadolol    New onset of congestive heart failure ([address] )  Assessment & Plan  Wt Readings from Last 3 Encounters:   03/12/23 90 6 kg (199 lb 11 8 oz)   03/07/23 88 9 kg (196 lb)   03/02/23 88 9 kg (196 lb)     New heart failure with reduced EF 30%  Per cardiology likely biventricular stress-induced cardiomyopathy in the setting of V-fib arrest  No obstructive disease on cardiac cath  Heart failure is following        Long QT syndrome type 2  Assessment & Plan  Pt's grandson, son, brother, and himself tested positive for genetic long qt syndrome   Pt had an episode of torsades while receiving an echo while admitted to [address]  Pt received a temporary pacemaker at [facility] and received an ICD 3/8 at [facility]  Nadolol 80 mg per Cardio's recommendation   Avoid QT prolonging agents  Prolonged QT interval  Assessment & Plan  See assessment and plan for Long QT syndrome    Paroxysmal A-fib ([facility])  Assessment & Plan  History of paroxysmal A-fib  Started on Xarelto  Continue home nadolol    Sepsis ([address] )  Assessment & Plan  Pt presented with a low grade fever, leukocytosis, and >100k cfu of ecoli on urine culture  Pt completed a course of ceftriaxone  Alcohol use  Assessment & Plan  Per pt's wife pt has a history of alcohol use  Pt drinks about 4 beers a day  Pt was found to have a macrocytosis and agitation  Pt received daily thiamine 100 mg and ativan  Pt is on CIWA protocol  Hypercholesterolemia  Assessment & Plan  Continue home atorvastatin     Esophageal reflux  Assessment & Plan  History of GERD     Continuing home protonix     CLL (chronic lymphocytic leukemia) ([facility])  Assessment & Plan  Pt has a history of CLL  Pt has a baseline of 14k-18k WBC  On admission his WBC was elevated at 49k  He is being followed by Dr Rio Barron outpatient every 6 months  Last visit on 11/2022  Currently under surveillance  Essential hypertension  Assessment & Plan  Continue losartan        VTE Pharmacologic Prophylaxis: VTE Score: 3 Moderate Risk (Score 3-4) - Pharmacological DVT Prophylaxis Ordered: rivaroxaban (Xarelto)  Patient Centered Rounds: I performed bedside rounds with nursing staff today  Discussions with Specialists or Other Care Team Provider:     Education and Discussions with Family / Patient: Patient declined call to        Total Time Spent on Date of Encounter in care of patient: 55 minutes This time was spent on one or more of the following: performing physical exam; counseling and coordination of care; obtaining or reviewing history; documenting in the medical record; reviewing/ordering tests, medications or procedures; communicating with other healthcare professionals and discussing with patient's family/caregivers  Current Length of Stay: 5 day(s)  Current Patient Status: Inpatient   Certification Statement: The patient will continue to require additional inpatient hospital stay due to Ventricular fibrillation  Discharge Plan: Anticipate discharge tomorrow to home  Code Status: Level 1 - Full Code    Subjective:   Patient seen and examined  Comfortable sitting in chair  No event overnight  No chest pain or shortness of breath  No nausea vomiting or diarrhea  ICU chart reviewed    Objective:     Vitals:   Temp (24hrs), Av 9 °F (36 6 °C), Min:97 6 °F (36 4 °C), Max:98 4 °F (36 9 °C)    Temp:  [97 6 °F (36 4 °C)-98 4 °F (36 9 °C)] 97 6 °F (36 4 °C)  HR:  [68-72] 69  Resp:  [16-21] 16  BP: (132-153)/(78-92) 132/79  SpO2:  [95 %-98 %] 97 %  Body mass index is 30 37 kg/m²  Input and Output Summary (last 24 hours):      Intake/Output Summary (Last 24 hours) at 3/12/2023 0919  Last data filed at 3/12/2023 0857  Gross per 24 hour   Intake 298 ml   Output 2145 ml   Net -1847 ml       Physical Exam:   Physical Exam   Patient is awake alert oriented no acute distress  Lungs clear to station bilateral  Heart pulse S1-S2 no murmur  Abdomen soft nontender obese   Hillman catheter in place  Lower extremities no edema    Additional Data:     Labs:  Results from last 7 days   Lab Units 23  0635 03/10/23  0541   WBC Thousand/uL 22 48* 28 90*   HEMOGLOBIN g/dL 8 7* 9 4*   HEMATOCRIT % 27 0* 28 9*   PLATELETS Thousands/uL 130* 167   BANDS PCT %  --  1   LYMPHO PCT %  --  80*   MONO PCT %  --  3*   EOS PCT %  --  1     Results from last 7 days   Lab Units 23  0635 23  2326 23  0605   SODIUM mmol/L 134*   < > 130*   POTASSIUM mmol/L 3 9   < > 4 0   CHLORIDE mmol/L 104   < > 95*   CO2 mmol/L 24   < > 24   BUN mg/dL 10   < > 14   CREATININE mg/dL 0 69   < > 0  91   ANION GAP mmol/L 6   < > 11   CALCIUM mg/dL 8 5   < > 8 4   ALBUMIN g/dL  --   --  4 4   TOTAL BILIRUBIN mg/dL  --   --  0 92   ALK PHOS U/L  --   --  66   ALT U/L  --   --  30   AST U/L  --   --  31   GLUCOSE RANDOM mg/dL 129   < > 135    < > = values in this interval not displayed  Results from last 7 days   Lab Units 03/06/23  1357   INR  1 25*         Results from last 7 days   Lab Units 03/07/23  0605   HEMOGLOBIN A1C % 5 4     Results from last 7 days   Lab Units 03/10/23  1848 03/06/23  1541 03/06/23  1357   LACTIC ACID mmol/L 1 8 3 5* 5 4*   PROCALCITONIN ng/ml  --   --  0 05       Lines/Drains:  Invasive Devices     Peripheral Intravenous Line  Duration           Long-Dwell Peripheral IV (Midline) 03/07/23 Left 4 days          Drain  Duration           Urethral Catheter Temperature probe 16 Fr  5 days              Urinary Catheter:  Goal for removal: No longer needed  Will place order to discontinue           Telemetry:  Telemetry Orders (From admission, onward)             48 Hour Telemetry monitoring  Continuous x 48 hours        References:    Telemetry Guidelines   Question:  Reason for 48 Hour Telemetry  Answer:  Arrhythmias Requiring Medical Therapy (eg  SVT, Vtach/fib, Bradycardia, Uncontrolled A-fib)                 Telemetry Reviewed: yes  Indication for Continued Telemetry Use: Arrthymias requiring medical therapy             Imaging: No pertinent imaging reviewed  Recent Cultures (last 7 days):   Results from last 7 days   Lab Units 03/06/23  1424 03/06/23  1400 03/06/23  1357   BLOOD CULTURE   --  No Growth After 5 Days  No Growth After 5 Days     URINE CULTURE  >100,000 cfu/ml Escherichia coli*  --   --        Last 24 Hours Medication List:   Current Facility-Administered Medications   Medication Dose Route Frequency Provider Last Rate   • acetaminophen  650 mg Oral Q6H PRN Jenna Watson MD     • aspirin  81 mg Oral Daily Jenna Watson MD     • atorvastatin  20 mg Oral Daily With Kasandra Granados MD     • folic acid  1 mg Oral Daily Vivien Vides MD     • gabapentin  100 mg Oral HS Vivien Vides MD     • lidocaine  1 patch Topical Daily Vivien Vides MD     • losartan  25 mg Oral Daily Kushal Javed MD     • melatonin  6 mg Oral HS Vivien Vides MD     • montelukast  10 mg Oral HS Vivien Vides MD     • multivitamin-minerals  1 tablet Oral Daily Vivien Vides MD     • nadolol  80 mg Oral Early Morning Vivien Vdies MD     • oxyCODONE  2 5 mg Oral Q4H PRN Vivien Vides MD     • oxyCODONE  5 mg Oral Q4H PRN Vivien Vides MD     • pantoprazole  40 mg Oral Early Morning Vivien Vides MD     • polyethylene glycol  17 g Oral BID Vivien Vides MD     • rivaroxaban  20 mg Oral Daily With Breakfast Vivien Vides MD     • senna-docusate sodium  2 tablet Oral BID Vivien Vides MD     • simethicone  80 mg Oral Q6H PRN Vivien Vides MD     • thiamine  100 mg Oral Daily Vivien Vides MD          Today, Patient Was Seen By: Ezio Davison DO    **Please Note: This note may have been constructed using a voice recognition system  **

## 2023-03-12 NOTE — ASSESSMENT & PLAN NOTE
Wt Readings from Last 3 Encounters:   03/12/23 90 6 kg (199 lb 11 8 oz)   03/07/23 88 9 kg (196 lb)   03/02/23 88 9 kg (196 lb)     New heart failure with reduced EF 30%  Per cardiology likely biventricular stress-induced cardiomyopathy in the setting of V-fib arrest  No obstructive disease on cardiac cath  Heart failure is following

## 2023-03-12 NOTE — PROGRESS NOTES
Heart Failure/ Pulmonary Hypertension Progress Note - Arvil Cluster 81 y o  male MRN: [id number]    Unit/Bed#: [facility] 421-01 Encounter: [id number]      Assessment:    Principal Problem:    Ventricular fibrillation (HCC)  Active Problems:    Essential hypertension    CLL (chronic lymphocytic leukemia) (HCC)    Esophageal reflux    Hypercholesterolemia    Alcohol use    Sepsis (HCC)    Paroxysmal A-fib (HCC)    Long QT syndrome type 2    Loss of consciousness ([address] )    New onset of congestive heart failure (HCC)      Assessment/Plan:     New heart failure with reduced ejection fraction  Etiology: most likely biventricular stress-induced cardiomyopathy in setting of VF arrest, diffuse mid to apical hypokinesis on echo  No obstructive disease on [facility]      Studies- personally reviewed by me     Echocardiogram 3/9/23  LVEF: 30%, diffuse mid to apical hypokinesis  LVIDd:  RV: upper limits of normal size, mid to apical severe hypokinesis of the RV free wall  MR: trace  PASP: 56mmHg, mild TR, peak TR velocity 3m/sec, estimated RAP 20mmHg  RVOT: late notching  Other: mild AI, mildly dilated ascending aorta     LHC 3/7/23: proximal to mid LAD 40% stenosis, otherwise normal coronaries     TTE 3/7/23: LVEF 40%  TTE 6/23/22: LVEF 60%  Moderate concentric LVH  Mildly dilated RV, normal systolic function  Moderate AI  Trace MR  Moderate TR, estimated RVSP 55-60mmHg  Midly dilated ascending aorta at 4 2cm     Neurohormonal Blockade:  --Beta-Blocker: nadolol 80mg daily for long QT  --ACEi, ARB or ARNi: losartan 25mg daily  --Aldosterone Receptor Blocker:  --SGLT2 Inhibitor:  --Diuretic: none     Sudden Cardiac Death Risk Reduction:  --ICD: MDT "BIV" ICD implant 3/8/23 (secondary prevention)     Electrical Resynchronization:  --Candidacy for BiV device:      Advanced Therapies (if appropriate):   --We will continue to monitor     Long QT 2 syndrome  Family history of this (grandson diagnosed with LQT2 at [address]), has reported history of seizure disorder  Outpatient beta blocker of metoprolol changed to nadolol  Vfib arrest x 2  Likely precipitated by QT prolonging SSRI and antibiotics  S/p lidocaine and TVP for overdrive pacing  S/p ICD implant - was to undergo dual chamber device but developed complete heart block intraop; LV lead attempted to be placed but no good targets; LPF lead placed with narrow QRS complex, appearing wider POD1 - EP optimized device settings  Paroxysmal atrial fibrillation/flutter  Rate: betablocker as above  AC: xarelto  Pulmonary hypertension  Likely group II disease with diastolic dysfunction and group III disease with mild restriction, untreated sleep apnea  Nonobstructive CAD  Dilated ascending aorta  CLL  Alcohol and marijuana use  UTI     Today's Plan:  Start spironolactone 25mg daily  Continue nadolol and losartan  Outpatient optimization of GDMT  Reassess EF after 3 months of GDMT  Discharge planning     Subjective:   Patient seen and examined  No significant events overnight  Autodiuresing    Review of Systems   Constitutional: Negative for chills and fever  Respiratory: Negative for chest tightness and shortness of breath  Cardiovascular: Negative for chest pain, palpitations and leg swelling  Gastrointestinal: Negative for abdominal distention, abdominal pain, nausea and vomiting  Neurological: Negative for dizziness and light-headedness  Objective: Intake/ Output: ?/3195  Weight: 199 lbs   Tele: v-paced      Vitals: Blood pressure 132/79, pulse 69, temperature 97 6 °F (36 4 °C), resp  rate 16, height 5' 8" (1 727 m), weight 90 6 kg (199 lb 11 8 oz), SpO2 96 %  , Body mass index is 30 37 kg/m² , I/O last 3 completed shifts:   In: 360 [P O :360]  Out: 3370 [Urine:3370]  I/O this shift:  In: 298 [P O :298]  Out: 650 [Urine:650]  Wt Readings from Last 3 Encounters:   03/12/23 90 6 kg (199 lb 11 8 oz)   03/07/23 88 9 kg (196 lb)   03/02/23 88 9 kg (196 lb)       Intake/Output Summary (Last 24 hours) at 3/12/2023 1148  Last data filed at 3/12/2023 0857  Gross per 24 hour   Intake 298 ml   Output 2145 ml   Net -1847 ml     I/O last 3 completed shifts: In: 360 [P O :360]  Out: 0801 [Urine:3370]    No significant arrhythmias seen on telemetry review         Physical Exam:  Vitals:    03/12/23 0339 03/12/23 0600 03/12/23 0751 03/12/23 0800   BP: 133/78  132/79    BP Location:       Pulse: 71  69    Resp:   16    Temp: 98 °F (36 7 °C)  97 6 °F (36 4 °C)    TempSrc:       SpO2: 96%  97% 96%   Weight:  90 6 kg (199 lb 11 8 oz)     Height:           GEN: Murphy Willams appears well, alert and oriented x 3, pleasant and cooperative   HEENT: NC/AT, moist mucosa, anicteric sclerae; extraocular muscles intact  NECK: supple, no carotid bruits   HEART: regular rhythm, normal S1 and S2, no murmurs, clicks, gallops or rubs, JVP is nonelevated  LUNGS: clear to auscultation bilaterally; no wheezes, rales, or rhonchi   ABDOMEN: normal bowel sounds, soft, no tenderness, no distention  EXTREMITIES: peripheral pulses normal; no clubbing, cyanosis, or edema  NEURO: no focal findings   SKIN: normal without suspicious lesions on exposed skin      Current Facility-Administered Medications:   •  acetaminophen (TYLENOL) tablet 650 mg, 650 mg, Oral, Q6H PRN, Ludger Eisenmenger, MD, 650 mg at 03/12/23 0401  •  aspirin chewable tablet 81 mg, 81 mg, Oral, Daily, Ludger Eisenmenger, MD, 81 mg at 03/12/23 2586  •  atorvastatin (LIPITOR) tablet 20 mg, 20 mg, Oral, Daily With Hussain Chris MD, 20 mg at 97/30/90 4341  •  folic acid (FOLVITE) tablet 1 mg, 1 mg, Oral, Daily, Ludger Eisenmenger, MD, 1 mg at 03/12/23 2573  •  gabapentin (NEURONTIN) capsule 100 mg, 100 mg, Oral, HS, Ludger Eisenmenger, MD, 100 mg at 03/11/23 2100  •  lidocaine (LIDODERM) 5 % patch 1 patch, 1 patch, Topical, Daily, Ludger Eisenmenger, MD  •  losartan (COZAAR) tablet 25 mg, 25 mg, Oral, Daily, Sandor Finn MD, 25 mg at 03/12/23 0819  •  melatonin tablet 6 mg, 6 mg, Oral, HS, Misti Coto MD, 6 mg at 03/11/23 2058  •  montelukast (SINGULAIR) tablet 10 mg, 10 mg, Oral, HS, Misti Coto MD, 10 mg at 03/10/23 2111  •  multivitamin-minerals (CENTRUM) tablet 1 tablet, 1 tablet, Oral, Daily, Misti Coto MD, 1 tablet at 03/12/23 4037  •  nadolol (CORGARD) tablet 80 mg, 80 mg, Oral, Early Morning, Misti Coto MD, 80 mg at 03/12/23 1516  •  oxyCODONE (ROXICODONE) oral solution 2 5 mg, 2 5 mg, Oral, Q4H PRN, Misti Coto MD, 2 5 mg at 03/10/23 2002  •  oxyCODONE (ROXICODONE) oral solution 5 mg, 5 mg, Oral, Q4H PRN, Misti Coto MD, 5 mg at 03/11/23 2332  •  pantoprazole (PROTONIX) EC tablet 40 mg, 40 mg, Oral, Early Morning, Misti Coto MD, 40 mg at 03/12/23 1922  •  polyethylene glycol (MIRALAX) packet 17 g, 17 g, Oral, BID, Misti Coto MD, 17 g at 03/12/23 2571  •  rivaroxaban (XARELTO) tablet 20 mg, 20 mg, Oral, Daily With Breakfast, Misti Coto MD, 20 mg at 03/12/23 6967  •  senna-docusate sodium (SENOKOT S) 8 6-50 mg per tablet 2 tablet, 2 tablet, Oral, BID, Misti Coto MD, 2 tablet at 03/12/23 0828  •  simethicone (MYLICON) chewable tablet 80 mg, 80 mg, Oral, Q6H PRN, Misti Coto MD, 80 mg at 03/11/23 2032  •  spironolactone (ALDACTONE) tablet 25 mg, 25 mg, Oral, Daily, Dwight Garcia MD  •  thiamine tablet 100 mg, 100 mg, Oral, Daily, Misti Coto MD, 100 mg at 03/12/23 0819      Labs & Results:    Results from last 7 days   Lab Units 03/06/23  1357   CK TOTAL U/L 38*     Results from last 7 days   Lab Units 03/11/23  0635 03/10/23  0541 03/09/23  0516   WBC Thousand/uL 22 48* 28 90* 33 53*   HEMOGLOBIN g/dL 8 7* 9 4* 9 5*   HEMATOCRIT % 27 0* 28 9* 28 7*   PLATELETS Thousands/uL 130* 167 168         Results from last 7 days   Lab Units 03/11/23  0635 03/10/23  0541 03/09/23  0516 03/07/23  2326 03/07/23  0605 03/06/23  1357   POTASSIUM mmol/L 3 9 4 0 4 4   < > 4 0 4 1   CHLORIDE mmol/L 104 100 104   < > 95* 98   CO2 mmol/L 24 25 22   < > 24 20*   BUN mg/dL 10 15 14   < > 14 10   CREATININE mg/dL 0 69 0 81 0 71   < > 0 91 0 96   CALCIUM mg/dL 8 5 8 6 8 4   < > 8 4 9 4   ALK PHOS U/L  --   --   --   --  66 88   ALT U/L  --   --   --   --  30 39   AST U/L  --   --   --   --  31 49*    < > = values in this interval not displayed       Results from last 7 days   Lab Units 03/06/23  1357   INR  1 25*           Tripp Barreto MD  Advanced Heart Failure and Mechanical Circulatory Ul  [facility] [address]

## 2023-03-13 ENCOUNTER — TRANSITIONAL CARE MANAGEMENT (OUTPATIENT)
Dept: FAMILY MEDICINE CLINIC | Facility: CLINIC | Age: 73
End: 2023-03-13

## 2023-03-13 VITALS
SYSTOLIC BLOOD PRESSURE: 153 MMHG | HEIGHT: 68 IN | RESPIRATION RATE: 20 BRPM | BODY MASS INDEX: 30.57 KG/M2 | WEIGHT: 201.7 LBS | OXYGEN SATURATION: 96 % | TEMPERATURE: 97.8 F | DIASTOLIC BLOOD PRESSURE: 80 MMHG | HEART RATE: 75 BPM

## 2023-03-13 LAB
ANION GAP SERPL CALCULATED.3IONS-SCNC: 1 MMOL/L (ref 4–13)
BUN SERPL-MCNC: 10 MG/DL (ref 5–25)
CALCIUM SERPL-MCNC: 7.4 MG/DL (ref 8.3–10.1)
CHLORIDE SERPL-SCNC: 109 MMOL/L (ref 96–108)
CO2 SERPL-SCNC: 26 MMOL/L (ref 21–32)
CREAT SERPL-MCNC: 0.66 MG/DL (ref 0.6–1.3)
GFR SERPL CREATININE-BSD FRML MDRD: 97 ML/MIN/1.73SQ M
GLUCOSE SERPL-MCNC: 111 MG/DL (ref 65–140)
POTASSIUM SERPL-SCNC: 3.9 MMOL/L (ref 3.5–5.3)
SODIUM SERPL-SCNC: 136 MMOL/L (ref 135–147)

## 2023-03-13 RX ORDER — SPIRONOLACTONE 25 MG/1
25 TABLET ORAL DAILY
Qty: 30 TABLET | Refills: 0 | Status: SHIPPED | OUTPATIENT
Start: 2023-03-13

## 2023-03-13 RX ORDER — GABAPENTIN 100 MG/1
100 CAPSULE ORAL
Qty: 30 CAPSULE | Refills: 0 | Status: SHIPPED | OUTPATIENT
Start: 2023-03-13 | End: 2023-03-20 | Stop reason: ALTCHOICE

## 2023-03-13 RX ORDER — LOSARTAN POTASSIUM 50 MG/1
50 TABLET ORAL DAILY
Qty: 30 TABLET | Refills: 0 | Status: SHIPPED | OUTPATIENT
Start: 2023-03-13

## 2023-03-13 RX ORDER — LIDOCAINE 50 MG/G
1 PATCH TOPICAL DAILY
Qty: 30 PATCH | Refills: 0 | Status: SHIPPED | OUTPATIENT
Start: 2023-03-13

## 2023-03-13 RX ORDER — NADOLOL 80 MG/1
80 TABLET ORAL
Qty: 30 TABLET | Refills: 0 | Status: SHIPPED | OUTPATIENT
Start: 2023-03-14

## 2023-03-13 RX ORDER — LOSARTAN POTASSIUM 25 MG/1
25 TABLET ORAL DAILY
Qty: 30 TABLET | Refills: 0 | Status: SHIPPED | OUTPATIENT
Start: 2023-03-13 | End: 2023-03-13

## 2023-03-13 RX ORDER — AMOXICILLIN 250 MG
2 CAPSULE ORAL 2 TIMES DAILY
Refills: 0
Start: 2023-03-13

## 2023-03-13 RX ADMIN — SPIRONOLACTONE 25 MG: 25 TABLET ORAL at 09:25

## 2023-03-13 RX ADMIN — Medication 1 TABLET: at 09:25

## 2023-03-13 RX ADMIN — NADOLOL 80 MG: 40 TABLET ORAL at 05:55

## 2023-03-13 RX ADMIN — RIVAROXABAN 20 MG: 20 TABLET, FILM COATED ORAL at 09:27

## 2023-03-13 RX ADMIN — ASPIRIN 81 MG: 81 TABLET, CHEWABLE ORAL at 09:25

## 2023-03-13 RX ADMIN — THIAMINE HCL TAB 100 MG 100 MG: 100 TAB at 09:25

## 2023-03-13 RX ADMIN — LOSARTAN POTASSIUM 25 MG: 25 TABLET, FILM COATED ORAL at 09:25

## 2023-03-13 RX ADMIN — FOLIC ACID 1 MG: 1 TABLET ORAL at 09:25

## 2023-03-13 RX ADMIN — PANTOPRAZOLE SODIUM 40 MG: 40 TABLET, DELAYED RELEASE ORAL at 05:32

## 2023-03-13 NOTE — ASSESSMENT & PLAN NOTE
Pt was found to be in vfib in the field and received shock x1 and CPR  ROSC was achieved in the field  In SLA pt had an episode of torsades during an echo procedure requiring a temporary pacer    Per EP likely precipitated by QT prolongation/SSRI and antibiotic  No obstructive coronary arteries seen on cardiac cath 3/7  Status post ICD placement on 3/8  EP is following  Continue nadolol with the plan to repeat cardiac echo in 3 months

## 2023-03-13 NOTE — OCCUPATIONAL THERAPY NOTE
Occupational Therapy Evaluation      Vitor Almonte    3/13/2023    Principal Problem:    Ventricular fibrillation ([address] )  Active Problems:    Essential hypertension    CLL (chronic lymphocytic leukemia) (HCC)    Esophageal reflux    Hypercholesterolemia    Alcohol use    Sepsis ([facility] )    Paroxysmal A-fib (HCC)    Long QT syndrome type 2    Loss of consciousness ([facility] [address] )    New onset of congestive heart failure ([facility] )      Past Medical History:   Diagnosis Date    Anxiety     BPH (benign prostatic hypertrophy)     Cancer ([facility] [address] )     CLL    CLL (chronic lymphocytic leukemia) ([facility] [address] )     2009    Colon polyp     Concussion     Resolved: 08/22/16    Depression     Diverticulitis 1/13/2020 2014 CT done 11/19 12/19 CT done     E coli bacteremia 6/27/2021 2/2 blood cultures with [facility]  Source appears to be the urine     Epididymitis 5/19/2021 5/2021    Gastrointestinal hemorrhage     Last assessed: 08/27/13    GERD (gastroesophageal reflux disease)     H/O vitamin D deficiency 7/7/2021    Hearing loss of aging     Hiatal hernia     History of right hip replacement 4/19/2021    History of transfusion     Hyperlipidemia     Hypertension     Hyponatremia 3/6/2023    Iron deficiency anemia     Microscopic hematuria     Last assessed: 06/28/13    OA (osteoarthritis)     right hip    Pneumothorax     Primary osteoarthritis of right hip 9/29/2017    He is status post right hip arthroplasty    Prostatitis     Pulmonary hypertension (HCC)     Seasonal allergies     Urinary tract infection associated with catheterization of urinary tract ([address] ) 2/5/2021    Pseudomonal UTI asymptomatic  Per Urology do not treat at this time  Urinary tract infection associated with catheterization of urinary tract ([facility] [address] ) 2/5/2021    Pseudomonal UTI asymptomatic  Per Urology do not treat at this time    He did have urosepsis from E coli 7/21    UTI (urinary tract infection)     in past    Wears glasses        Past Surgical History:   Procedure Laterality Date    BLADDER SURGERY      CARDIAC CATHETERIZATION Left 3/7/2023    Procedure: Cardiac Left Heart Cath;  Surgeon: Yoli Guerrero MD;  [facility]; Service: Cardiology    CARDIAC CATHETERIZATION N/A 3/7/2023    Procedure: Cardiac temporary pacemaker;  Surgeon: Yoli Guerrero MD;  [facility]; Service: Cardiology    CARDIAC ELECTROPHYSIOLOGY PROCEDURE N/A 3/8/2023    Procedure: Cardiac icd implant;  Surgeon: Quentin Scanlon MD;  [facility]; Service: Cardiology    COLONOSCOPY      CYSTOSCOPY  10/09/2014    Diagnostic    CYSTOSCOPY  06/29/2020    FL CYSTOGRAM  08/15/2022    FRACTURE SURGERY      left lower arm    FRACTURE SURGERY      left femur    HERNIA REPAIR      JOINT REPLACEMENT Right     hip    OTHER SURGICAL HISTORY  03/2011    Spinal anesthesia epidural    MD ARTHRP ACETBLR/PROX FEM PROSTC AGRFT/ALGRFT Right 09/29/2017    Procedure: ARTHROPLASTY HIP TOTAL ANTERIOR;  Surgeon: Lorena Brooks MD;  [facility]: [facility];  Service: Orthopedics    TONSILLECTOMY AND ADENOIDECTOMY      TRANSURETHRAL RESECTION OF PROSTATE      x 2    WRIST SURGERY          03/13/23 0902   OT Last Visit   OT Visit Date 03/13/23   Note Type   Note type Evaluation   Pain Assessment   Pain Assessment Tool 0-10   Pain Score No Pain   Restrictions/Precautions   Weight Bearing Precautions Per Order No   Other Precautions Cardiac/sternal;Telemetry  (ICD placement 3/8)   Home Living   Type of Home Apartment   Home Layout One level   Bathroom Shower/Tub Tub/shower unit   Additional Comments reports having 3 wide steps to enter and that he uses he RW for the steps  then a few minutes later he reports not using a walker at all   Prior Function   Level of [place] Independent with ADLs; Independent with functional mobility   Lives With Spouse   Falls in the last 6 months 0   Comments pt denies having had any falls   "I am just like you, I don't need any help"   Lifestyle   Intrinsic Gratification watch TV  likes sports   General   Additional Pertinent History pt is somewhat defensive w answering questions  easily irriatated and annoyed  reports he doesnt understand why he has to go through this becuase there is nothing wrong w him  educated on reason for OT evaluation and reasoning for answering home set up questions  pt more cooperative after explanations   Subjective   Subjective "I'm just like you, I don't need any walker or anything"   ADL   Eating Assistance 7  [address]  Supervision/Setup   Additional Comments LB self care sitting down  educated on safety w LB self care such as sitting down for safety in re balance   Bed Mobility   Additional Comments pt OOB in recliner chair   Transfers   Sit to Stand 6  Modified independent   Stand to Sit 6  Modified independent   Stand pivot 6  Modified independent   Functional Mobility   Functional Mobility 7  Independent   Balance   Static Sitting Good   Dynamic Sitting Fair +   Static Standing Fair +   Dynamic Standing Fair   Activity Tolerance   Activity Tolerance Patient tolerated treatment well   Medical Staff Made Aware Co-eval with PT Matias due to medical complexity   Nurse Made Aware ok to see pr RN   RUE Assessment   RUE Assessment WFL   LUE Assessment   LUE Assessment WFL   Hand Function   Gross Motor Coordination Functional   Fine Motor Coordination Functional   Psychosocial   Psychosocial (WDL) X   Patient Behaviors/Mood Anxious;Irritable; Cooperative   Cognition   Overall Cognitive Status WFL   Arousal/Participation Alert   Attention Attends with cues to redirect   Orientation Level Oriented X4   Memory Within functional limits   Following Commands Follows one step commands without difficulty   Assessment   Assessment Pt is a 81 y o  male seen for OT evaluation s/p admit to [facility] on 3/7/2023 w/ Ventricular fibrillation ([address] )  Pt initially admitted after LOC  He underwent ICD placement on 3/8/23  Pt also with new heart failure w reduced ejection fraction  Comorbidities affecting pt's functional performance at time of assessment include: HTN, obesity and seizure like activity, DDD, spinal stenosis lumbar region, DIXIE, BPH, CLL  Personal factors affecting pt at time of IE include:behavioral pattern  Pt reportedly drinks 5 beer per day  Prior to admission, pt was living w his wife being independent w self care, mobility  He drives  Upon evaluation: Pt somewhat resistive to answering questions, he is easily irritable  He can do self care on his own, able to walk in his room/bathroom and hallway w S/Independent  No loss of balance noted  Attempted to educate pt on safety awareness such as slowing down, do LB self care while seated  Pt replied that he knows all this stuff  Based on findings from OT evaluation and functional performance deficits, pt has been identified as a  Moderate complexity evaluation  The patient's raw score on the AM-PAC Daily Activity inpatient short form is 22, standardized score is 47 1, greater than 39 4  Patients at this level are likely to benefit from discharge to home  From OT standpoint, recommendation at time of d/c would be home w family support  DC OT at this time     Goals   Patient Goals to get out of here   Plan   OT Frequency Eval only   Recommendation   OT Discharge Recommendation No rehabilitation needs   AM-PAC Daily Activity Inpatient   Lower Body Dressing 3   Bathing 3   Toileting 4   Upper Body Dressing 4   Grooming 4   Eating 4   Daily Activity Raw Score 22   Daily Activity Standardized Score (Calc for Raw Score >=11) 47 1

## 2023-03-13 NOTE — CASE MANAGEMENT
Outpatient Advanced Heart Failure LCSW present during HF team rounds this morning  Dr Toni Vasquez requested list of low salt foods for patient  LCSW suggested OP Nutrition referral   LCSW also provided patient and spouse with 'Guide to Living with Heart Failure' booklet  Reviewed weight chart in the booklet for daily weights and Heart Zone tool  Also pointed out the sodium education included in the booklet  Educated pt and spouse about Juan A Leyva on smart phone to search sodium and nutritional content of menu items from their favorite restaurants  Encouraged patient and spouse to discuss OP Nutrition referral with their Cardiologist at their follow-up appt

## 2023-03-13 NOTE — NURSING NOTE
Pt's Behavior became aggressive after I had received report and gone back to his room to ask how I may help him  He was angry and screaming out as he stood up placing his face in mine, swinging his fist to hit me  He screamed no one was helping him, and I assured him I was beginning my shift and was there to assist him in whatever needs he may have  The aide had been in the room at [address] [place] [address] and the day shift nurse answered his call bell at 1910 to bring him back a straight cath kit for his use  He screamed at me for another 15 minutes,kierraty statements yelled at me,  with 2 other nurses coming into the room to assist me  He stated " no one came to see him for over an hour"  I told him two people where there to see and assist him in the 45 minutes it took me to get to his room  I asked with the two nurses present what he needed  He asked for a soda, which a new one was on his table and he then screamed ''this is unacceptable to wait an hour"  He asked for the nursing supervisor and charge nurse  The charge nurse came in and he then apologized for his behavior  I called the nursing supervisor to alert her to his aggressive physical behavior towards me and others in the room  He said he was mad no one came in to help him  Again,  he was asked what we could assist him with? He asked to be washed up, new gown, and wanted to go back to bed, as he stated he was up for 10 hours and was tired  He asked for a beer, ativan and  snacks, and continued to call for items for the next two hours  After the charge nurse listened to him with me present, he continued to apologize to us, and he was much calmer  All his needs were met, and he remained stable and back to bed

## 2023-03-13 NOTE — ASSESSMENT & PLAN NOTE
Pt's grandson, son, brother, and himself tested positive for genetic long qt syndrome  Pt had an episode of torsades while receiving an echo while admitted to [facility]  Pt received a temporary pacemaker at [facility] and received an ICD 3/8 at \[facility]\""       Nadolol 80 mg per Cardio's recommendation   Avoid QT prolonging agents, discontinue Zoloft

## 2023-03-13 NOTE — PHYSICAL THERAPY NOTE
Physical Therapy Evaluation     Patient's Name: Elmira Louise    Admitting Diagnosis  Ventricular fibrillation [I49 01]    Problem List  Patient Active Problem List   Diagnosis    Essential hypertension    CLL (chronic lymphocytic leukemia) (HCC)    Allergic rhinitis due to pollen    Erectile dysfunction of non-organic origin    Esophageal reflux    Pulmonary hypertension ([facility] )    Spinal stenosis of lumbar region    Iron deficiency anemia    DDD (degenerative disc disease), cervical    DIXIE not currently treated    Hypercholesterolemia    BPH with obstruction/lower urinary tract symptoms    GAVE (gastric antral vascular ectasia)    Autoimmune hemolytic anemia (HCC)    Encysted hydrocele    Obesity (BMI 30 0-34  9)    Diverticulum of bladder    Seizure-like activity (HCC)    Alcohol use    Sepsis ([facility] )    Ventricular fibrillation (HCC)    Unresponsive episode    Narrow complex tachycardia (HCC)    Paroxysmal A-fib ([facility])    Prolonged QT interval    Long QT syndrome type 2    Loss of consciousness ([facility] )    New onset of congestive heart failure ([facility] )       Past Medical History  Past Medical History:   Diagnosis Date    Anxiety     BPH (benign prostatic hypertrophy)     Cancer (HCC)     CLL    CLL (chronic lymphocytic leukemia) ([facility] )     2009    Colon polyp     Concussion     Resolved: 08/22/16    Depression     Diverticulitis 1/13/2020 2014 CT done 11/19 12/19 CT done     E coli bacteremia 6/27/2021    2/2 blood cultures with [facility]  Source appears to be the urine     Epididymitis 5/19/2021 5/2021    Gastrointestinal hemorrhage     Last assessed: 08/27/13    GERD (gastroesophageal reflux disease)     H/O vitamin D deficiency 7/7/2021    Hearing loss of aging     Hiatal hernia     History of right hip replacement 4/19/2021    History of transfusion     Hyperlipidemia     Hypertension     Hyponatremia 3/6/2023    Iron deficiency anemia     Microscopic hematuria     Last assessed: 06/28/13    OA (osteoarthritis) right hip    Pneumothorax     Primary osteoarthritis of right hip 9/29/2017    He is status post right hip arthroplasty    Prostatitis     Pulmonary hypertension (HCC)     Seasonal allergies     Urinary tract infection associated with catheterization of urinary tract ([facility] ) 2/5/2021    Pseudomonal UTI asymptomatic  Per Urology do not treat at this time  Urinary tract infection associated with catheterization of urinary tract ([facility] [address] ) 2/5/2021    Pseudomonal UTI asymptomatic  Per Urology do not treat at this time  He did have urosepsis from E coli 7/21    UTI (urinary tract infection)     in past    Wears glasses        Past Surgical History  Past Surgical History:   Procedure Laterality Date    BLADDER SURGERY      CARDIAC CATHETERIZATION Left 3/7/2023    Procedure: Cardiac Left Heart Cath;  Surgeon: Louann Quiroz MD;  [facility]; Service: Cardiology    CARDIAC CATHETERIZATION N/A 3/7/2023    Procedure: Cardiac temporary pacemaker;  Surgeon: Louann Quiroz MD;  [facility]; Service: Cardiology    CARDIAC ELECTROPHYSIOLOGY PROCEDURE N/A 3/8/2023    Procedure: Cardiac icd implant;  Surgeon: Dean Alexander MD;  [facility];   Service: Cardiology    COLONOSCOPY      CYSTOSCOPY  10/09/2014    Diagnostic    CYSTOSCOPY  06/29/2020    FL CYSTOGRAM  08/15/2022    FRACTURE SURGERY      left lower arm    FRACTURE SURGERY      left femur    HERNIA REPAIR      JOINT REPLACEMENT Right     hip    OTHER SURGICAL HISTORY  03/2011    Spinal anesthesia epidural    [place] ARTHRP ACETBLR/PROX FEM PROSTC AGRFT/ALGRFT Right 09/29/2017    Procedure: ARTHROPLASTY HIP TOTAL ANTERIOR;  Surgeon: Katie Mathews MD;  Location: [facility];  Service: Orthopedics    TONSILLECTOMY AND ADENOIDECTOMY      TRANSURETHRAL RESECTION OF PROSTATE      x 2    WRIST SURGERY          03/13/23 0903   PT Last Visit   PT Visit Date 03/13/23   Note Type   Note type Evaluation   Pain Assessment   Pain Assessment Tool 0-10   Pain Score No Pain   Restrictions/Precautions   Braces or Orthoses   (denies)   Other Precautions Cardiac/sternal;Telemetry  ((L) UE post ICD placement)   Home Living   Type of [facility] One level  (4 CHRISTOPHER w/o hand rail (reports the steps are spread and he is able to place a walker on top of each step) and 2 CHRISTOPHER w/ hand rail)   Home Equipment Walker   Prior Function   Level of [place] Independent with functional mobility  (amb w/o AD; uses rw on the steps)   Lives With Spouse   General   Additional Pertinent History cleared for assessment by lima   Cognition   Overall Cognitive Status [facility]   Arousal/Participation Alert   Orientation Level Oriented to person;Oriented to place;Oriented to situation   Memory Within functional limits   Following Commands Follows one step commands without difficulty   Subjective   Subjective Alert; in the chair; responds to questions appropriately; occasionally becomes irritable during the initial interview to obtain info on premorbid functional status; agreeable to demonstrate mobility skills;   RUE Assessment   RUE Assessment WFL  (AROM)   LUE Assessment   LUE Assessment X  (sld not tested)   RLE Assessment   RLE Assessment WFL  (AROM)   Strength RLE   RLE Overall Strength   (fair +/ good - (grossly))   LLE Assessment   LLE Assessment WFL  (AROM)   Strength LLE   LLE Overall Strength   (fair +/ good - (grossly))   Transfers   Sit to Stand 6  Modified independent   Stand to Sit 6  Modified independent   Ambulation/Elevation   Gait pattern Short stride; Inconsistent jay; Wide HERNAN; Forward Flexion  (no overt LOB, knee buckling or excessive swaying)   Gait Assistance 6  Modified independent   Assistive Device None   Distance 50 ft   Stair Management Assistance Not tested  (pt adamently denies the need to practice steps prior to D/C and does not anticipate difficulty on the CHRISTOPHER at home)   Balance   Static Sitting Good   Dynamic Sitting Fair +   Static Standing Fair +   Dynamic Standing Fair   Ambulatory Fair   Activity Tolerance   Activity Tolerance Patient tolerated treatment well   Medical Staff Made Aware [place] performed w/ OTR due to complexity of medical status   Nurse Made Aware spoke to Rola Pinon RN   Assessment   Assessment Pt is 81 y o  male admitted with hx of LOC and Dx of Ventricular fibrillation; pt underwent ICD placement on 3/8  Pt 's comorbidities affecting POC include: Anxiety, CLL (chronic lymphocytic leukemia) ([facility] [address] ), Concussion, Depression, Hearing loss of aging, Hypertension, OA (osteoarthritis), and Pulmonary hypertension and personal factors of: CHRISTOPHER  Pt's clinical presentation is currently  evolving which is evident in ongoing telem monitoring and abn lab values  Pt presents w/ min generalized weakness, min deconditioning, and inconsistent gait patterns (no overt uncorrected LOB, gross knee buckling, or swaying observed) but w/ overall observed mobility status on level surfaces appearing to be at or near premorbid level; based on above, anticipate pt will return home w/ family support upon D/C from PT/mobility stand point and when medically cleared; no other immediate PT needs otherwise identified while in the hospital; pt denies the need for home PT; will otherwise sign off     Goals   Patient Goals to go home   PT Treatment Day 0   Plan   Treatment/Interventions Spoke to nursing;OT;Spoke to case management   PT Frequency Other (Comment)  (D/C PT)   Recommendation   PT Discharge Recommendation No rehabilitation needs   AM-PAC Basic Mobility Inpatient   Turning in Flat Bed Without Bedrails 4   Lying on Back to Sitting on Edge of Flat Bed Without Bedrails 4   Moving Bed to Chair 4   Standing Up From Chair Using Arms 4   Walk in Room 4   Climb 3-5 Stairs With Railing 3   Basic Mobility Inpatient Raw Score 23   Basic Mobility Standardized Score 50 88   Highest Level Of Mobility   JH-HLM Goal 7: Walk 25 feet or more   JH-HLM Achieved 7: Walk 25 feet or more   Modified [place] Scale   Modified [place] Scale 3   End of Consult   Patient Position at End of Consult Bedside chair; All needs within reach             [facility], PT

## 2023-03-13 NOTE — ASSESSMENT & PLAN NOTE
Pt has a history of CLL  Pt has a baseline of 14k-18k WBC  On admission his WBC was elevated at 49k  He is being followed by Dr Boaz Cox outpatient every 6 months  Last visit on 11/2022  Currently under surveillance

## 2023-03-13 NOTE — ASSESSMENT & PLAN NOTE
Pt was watching a movie with his wife when he told his wife that he was not feeling well and immediately lost consciousness  EMS arrived and found the pt unresponsive  The pt was found to be in V-fib and the pt was shocked x1 and underwent CPR  ROSC achieved in the field  Upon arrival in the Ed, the pt was found to be breathing spontaneously, alert, awake, but lethargic  Pt was worked up by neurology, was unremarkable   Pt did not have any focal neurologic deficits, strengths were 5/5 in upper and lower extremities bilaterally  No occlusion or thrombosis       Stable to discharge home today    Likely precipitated by QT prolonging SSRI and antibiotics

## 2023-03-13 NOTE — ASSESSMENT & PLAN NOTE
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

## 2023-03-13 NOTE — PLAN OF CARE
Problem: Prexisting or High Potential for Compromised Skin Integrity  Goal: Skin integrity is maintained or improved  Description: INTERVENTIONS:  - Identify patients at risk for skin breakdown  - Assess and monitor skin integrity  - Assess and monitor nutrition and hydration status  - Monitor labs   - Assess for incontinence   - Turn and reposition patient  - Assist with mobility/ambulation  - Relieve pressure over bony prominences  - Avoid friction and shearing  - Provide appropriate hygiene as needed including keeping skin clean and dry  - Evaluate need for skin moisturizer/barrier cream  - Collaborate with interdisciplinary team   - Patient/family teaching  - Consider wound care consult   Outcome: Completed

## 2023-03-13 NOTE — PROGRESS NOTES
Progress Note - Cardiology   Esther Handler 81 y o  male MRN: [id number]  Unit/Bed#: [facility] 421-01 Encounter: [id number]    Assessment/Plan:    Severe Biventricular failure:  Etiology: most likely biventricular stress-induced cardiomyopathy in setting of VF arrest, diffuse mid to apical hypokinesis on echo  No obstructive disease on [facility]  NYHA Functional Class ACC Stage  Volume Management:  ? Volume assessment (weights, NTproBNP, exam): euvolemic   ? Diuretic:    ? K+ supplementation and lab monitoring:     Neurohormonal Blockade / GDMT:  ? Beta blocker:   nadolol 80 mg daily   ? ACEi/ARB/ARNI:  losartan 25 mg daily   ? Aldosterone antagonist:  sprio 25 mg daily   ? SGLT2i:     ? Other pharmacotherapy (isordil/hydral, digoxin, ivabradine, vericiguat, omecamtiv):     Sudden cardiac death risk reduction:  ?  ICD candidacy:  S/p ICD implant - was to undergo dual chamber device but developed complete heart block intraop; LV lead attempted to be placed but no good targets; LPF lead placed with narrow QRS complex, appearing wider POD1 - EP optimized device settings    Other heart failure considerations:  ? Mitral clip, IV iron, DIXIE eval, clinical trial, cardiac rehab, palliative care, advanced therapies:    ? Cardiac rehab    Heart Failure Diet ( <2g Sodium & 1 5 L fluids daily)  Daily weights  Accurate I/O:  Telemetry  K>4 & Mg >2  Lines & Hemodynamic Monitoring:  HF labs  -repeat echo in 3 months     Vfib arrest x 2: Likely precipitated by QT prolonging SSRI and antibiotics  S/p lidocaine and TVP for overdrive pacing  S/p ICD implant - was to undergo dual chamber device but developed complete heart block intraop; LV lead attempted to be placed but no good targets; LPF lead placed with narrow QRS complex, appearing wider POD1 - EP optimized device settings  -Patient did not have genetic testing for his long QT and would be preferable to identify the subtype -Family has long QT type II  -nadolol as above     Paroxysmal atrial fibrillation/flutter  Rate: betablocker as above  AC: xarelto    Pulmonary hypertension  Likely group II disease with diastolic dysfunction and group III disease with mild restriction, untreated sleep apnea    Nonobstructive CAD  -Noted    Dilated ascending aorta  -Noted     CLL  -noted     Alcohol and marijuana use  -noted     Recommendations and plan today:   -Okay for discharge from a cardiac standpoint    Subjective/Objective   No events overnight no complaints  Vitals: /80   Pulse 75   Temp 97 8 °F (36 6 °C)   Resp 20   Ht 5' 8" (1 727 m)   Wt 91 5 kg (201 lb 11 2 oz)   SpO2 96%   BMI 30 67 kg/m²   Vitals:    03/12/23 0600 03/13/23 0532   Weight: 90 6 kg (199 lb 11 8 oz) 91 5 kg (201 lb 11 2 oz)     Orthostatic Blood Pressures    Flowsheet Row Most Recent Value   Blood Pressure 153/80 filed at 03/13/2023 0707   Patient Position - Orthostatic VS Lying filed at 03/13/2023 0300            Intake/Output Summary (Last 24 hours) at 3/13/2023 3728  Last data filed at 3/12/2023 2201  Gross per 24 hour   Intake 322 ml   Output 2040 ml   Net -1718 ml       Invasive Devices     Peripheral Intravenous Line  Duration           Long-Dwell Peripheral IV (Midline) 03/07/23 Left 5 days                Review of Systems: Negative     Physical Exam: GEN: Fritz Fatima appears well, alert and oriented x 3, pleasant and cooperative   HEENT: NC/AT, moist mucosa, anicteric sclerae; extraocular muscles intact  NECK: supple, no carotid bruits   HEART: regular rhythm, normal S1 and S2, no murmurs, clicks, gallops or rubs, JVP is nonelevated  LUNGS: clear to auscultation bilaterally; no wheezes, rales, or rhonchi   ABDOMEN: normal bowel sounds, soft, no tenderness, no distention  EXTREMITIES: peripheral pulses normal; no clubbing, cyanosis, or edema  NEURO: no focal findings   SKIN: normal without suspicious lesions on exposed skin  Lab Results: I have personally reviewed pertinent lab results  Imaging:  I have personally reviewed pertinent reports  EKG:   VTE Pharmacologic Prophylaxis: Sequential compression device (Venodyne)   VTE Mechanical Prophylaxis: sequential compression device    Counseling / Coordination of Care  Total Critical Care time spent 45 minutes excluding procedures, teaching and family updates

## 2023-03-13 NOTE — DISCHARGE SUMMARY
[address] [facility]  Discharge- Darvin Huertas 1950, 81 y o  male MRN: [id number]  Unit/Bed#: [facility] 421-01 Encounter: [id number]  Primary Care Provider: Ludwig Araya MD   Date and time admitted to hospital: 3/7/2023  5:53 PM    Loss of consciousness [facility])  Assessment & Plan  Pt was watching a movie with his wife when he told his wife that he was not feeling well and immediately lost consciousness  EMS arrived and found the pt unresponsive  The pt was found to be in V-fib and the pt was shocked x1 and underwent CPR  ROSC achieved in the field  Upon arrival in the Ed, the pt was found to be breathing spontaneously, alert, awake, but lethargic  Pt was worked up by neurology, was unremarkable   Pt did not have any focal neurologic deficits, strengths were 5/5 in upper and lower extremities bilaterally  No occlusion or thrombosis  Stable to discharge home today    Likely precipitated by QT prolonging SSRI and antibiotics     * Ventricular fibrillation ([address] )  Assessment & Plan  Pt was found to be in vfib in the field and received shock x1 and CPR  ROSC was achieved in the field  In [place] pt had an episode of torsades during an echo procedure requiring a temporary pacer    Per EP likely precipitated by QT prolongation/SSRI and antibiotic  No obstructive CAD seen on cardiac cath 3/7  Status post ICD placement on 3/8  EP is following  Continue nadolol with the plan to repeat cardiac echo in 3 months    New onset of congestive heart failure (HCC)  Assessment & Plan  Wt Readings from Last 3 Encounters:   03/13/23 91 5 kg (201 lb 11 2 oz)   03/07/23 88 9 kg (196 lb)   03/02/23 88 9 kg (196 lb)     New heart failure with reduced EF 30%  Per cardiology likely biventricular stress-induced cardiomyopathy in the setting of V-fib arrest  No obstructive disease on cardiac cath  Heart failure is following  Continue spironolactone and Cozaar  Repeat cardiac echo in 3 months  Outpatient follow-up        Long QT syndrome type 2  Assessment & Plan  Pt's grandson, son, brother, and himself tested positive for genetic long qt syndrome  Pt had an episode of torsades while receiving an echo while admitted to [facility]  Pt received a temporary pacemaker at [facility] and received an ICD 3/8 at [facility]  Nadolol 80 mg per Cardio's recommendation   Avoid QT prolonging agents, discontinue Zoloft    Paroxysmal A-fib ([facility])  Assessment & Plan  History of paroxysmal A-fib  Started on Xarelto  Continue home nadolol    Sepsis ([facility] [address] )  Assessment & Plan  Pt presented with a low grade fever, leukocytosis, and >100k cfu of ecoli on urine culture  Pt completed a course of ceftriaxone  Alcohol use  Assessment & Plan  Per pt's wife pt has a history of alcohol use  Pt drinks about 4 beers a day  Pt was found to have a macrocytosis and agitation  Pt received daily thiamine 100 mg and ativan  Pt is on CIWA protocol  Hypercholesterolemia  Assessment & Plan  Continue home atorvastatin     Esophageal reflux  Assessment & Plan  History of GERD     Continuing home protonix     CLL (chronic lymphocytic leukemia) ([facility])  Assessment & Plan  Pt has a history of CLL  Pt has a baseline of 14k-18k WBC  On admission his WBC was elevated at 49k  He is being followed by Dr Rio Barron outpatient every 6 months  Last visit on 11/2022  Currently under surveillance        Essential hypertension  Assessment & Plan  Continue losartan        Medical Problems     Resolved Problems  Date Reviewed: 3/13/2023   None       Discharging Physician / Practitioner: Lauree Gowers, DO  PCP: Cherelle Zimmer MD  Admission Date:   Admission Orders (From admission, onward)     Ordered        03/07/23 1838  Inpatient Admission  Once                      Discharge Date: 03/13/23    Consultations During Hospital Stay:  · EP  · Heart failure  · Neurology    Procedures Performed:   · ICD placed on 3/8  · Cardiac cath with no obstructive coronary arteries    Significant Findings / Test Results:   · As above    Incidental Findings:   None  Test Results Pending at Discharge (will require follow up):   · none     Outpatient Tests Requested:  · Cardiac echo in 3 months    Complications:   none    Reason for Admission:   [facility] [place] by [facility] Course:     Selene Bhat a 81 y  o  male who presents with PMH of paroxysmal afib, HTN, long QT, alcohol use, marijuana use, and CLL  Pt initially presented to [facility] ED 3/6 for loss of consciousness  Per the pt's wife, they were watching a movie when the pt told his wife that he was not feeling well and immediately lost consciousness  EMS arrived and found the patient to be unresponsive  The pt was found to be in V-fib and patient was shocked x1 and underwent CPR  ROSC achieved in the field  Upon arrival to the ED, the pt was found to be breathing spontaneously, alert, awake, but lethargic  Pt had similar episodes last year with concern of possible seizure activity  EKG showed A-fib with RVR  Pt denies chest pain, fevers, chills, nausea, or vomiting at this time       Cardiology was consulted and reviewed the ECG which revealed a regular narrow complex tachycardia suggesting SVT  Pt was then given adenosine  Pt was found to have elevated troponin, a low grade fever and elevated WBC showing evidence of sepsis  Urinalysis showed WBC and innumerable bacteria  Pt was started on ceftriaxone  Neurology was consulted for evaluation of his loss of consciousness and planned for CTA head/neck to exclude basilar/other critical vessel abnormalities that could lead to LOC/hypoperfusion and syncope  Otherwise neurology workup was unremarkable  On 3/7 pt was receiving an echo when the pt developed v fib arrest and went into torsades with QT of 671 ms  Started on lidocaine drip  Pt came out of arrhythmia prior to defibrillation  The echo showed apex hypokinesis which is new prior to echo   Pt received cardiac cath in [facility], had temporary pacer placed for recurrent torsades and has been transferred to [facility] MICU with plans for ICD placement         Pt was admitted on 3/7 on lidocaine gtt and ceftriaxone for his UTI  Received an ICD placement on 3/8, started on nadolol  Pt had persistent tachycardia which prompted another night of observation in the ICU  On 3/10 EP team changed the setting on the ICD and was able to stabilize the patient's tachycardia       During the course of his hospitalization pt was found to be confused, agitated, and forgetful  ICU team was communicated that the pt had a history of dementia but according to the patient's son, this is a new finding at this time  Currently patient is in stable condition, wants to go home  Not sure if patient will wait for PT OT evaluation    Please see above list of diagnoses and related plan for additional information  Condition at Discharge: stable    Discharge Day Visit / Exam:   Subjective:      Patient is comfortable in bed eating breakfast  No event overnight  Wants to go home  No complaints  Vitals: Blood Pressure: 153/80 (03/13/23 0707)  Pulse: 75 (03/13/23 0707)  Temperature: 97 8 °F (36 6 °C) (03/13/23 0707)  Temp Source: Oral (03/13/23 0300)  Respirations: 20 (03/13/23 0300)  Height: 5' 8" (172 7 cm) (03/09/23 1345)  Weight - Scale: 91 5 kg (201 lb 11 2 oz) (03/13/23 0532)  SpO2: 96 % (03/13/23 0707)  Exam:   Physical Exam     Patient is awake alert oriented no acute distress  Lungs clear to station bilateral  Heart pulse S1-S2 no murmur  Abdomen soft nontender obese   Hillman catheter in place  Lower extremities no edema    Discussion with Family: Updated  (son) via phone  Discharge instructions/Information to patient and family:   See after visit summary for information provided to patient and family  Provisions for Follow-Up Care:  See after visit summary for information related to follow-up care and any pertinent home health orders         Disposition:   Home    Planned Readmission: no     Discharge Statement:  I spent 45  minutes discharging the patient  This time was spent on the day of discharge  I had direct contact with the patient on the day of discharge  Greater than 50% of the total time was spent examining patient, answering all patient questions, arranging and discussing plan of care with patient as well as directly providing post-discharge instructions  Additional time then spent on discharge activities  Discharge Medications:  See after visit summary for reconciled discharge medications provided to patient and/or family        **Please Note: This note may have been constructed using a voice recognition system**

## 2023-03-15 ENCOUNTER — OFFICE VISIT (OUTPATIENT)
Dept: CARDIOLOGY CLINIC | Facility: CLINIC | Age: 73
End: 2023-03-15

## 2023-03-15 VITALS
DIASTOLIC BLOOD PRESSURE: 78 MMHG | BODY MASS INDEX: 30.41 KG/M2 | HEART RATE: 76 BPM | SYSTOLIC BLOOD PRESSURE: 138 MMHG | WEIGHT: 200 LBS

## 2023-03-15 DIAGNOSIS — I27.20 PULMONARY HYPERTENSION (HCC): Chronic | ICD-10-CM

## 2023-03-15 DIAGNOSIS — I42.8 NICM (NONISCHEMIC CARDIOMYOPATHY) (HCC): ICD-10-CM

## 2023-03-15 DIAGNOSIS — I48.91 ATRIAL FIBRILLATION, UNSPECIFIED TYPE (HCC): Primary | ICD-10-CM

## 2023-03-15 DIAGNOSIS — I50.9 NEW ONSET OF CONGESTIVE HEART FAILURE (HCC): ICD-10-CM

## 2023-03-15 DIAGNOSIS — I10 ESSENTIAL HYPERTENSION: Chronic | ICD-10-CM

## 2023-03-15 DIAGNOSIS — I45.81 LONG Q-T SYNDROME: ICD-10-CM

## 2023-03-15 NOTE — PROGRESS NOTES
Cardiology             Conrado Cantu  1950  [id number]              Assessment/Plan:    Long QT syndrome 2, now on nadolol, status post dual-chamber/biventricular AICD placed 3/8/2023  Paroxysmal atrial fibrillation/flutter  Paroxysmal SVT, status post adenosine 3/6/2023  Torsade de pointes and V-fib episode 3/6/2023  Nonischemic cardiomyopathy, ejection fraction 30%  Normal coronary angiography 3/7/2023  Mild ascending aortic aneurysm  CLL  History of heavy alcohol use  Dyslipidemia        ECG revealing ventricular paced rhythm, continue device interrogations as scheduled  He is requesting his pressure bandage to be removed and it has been 1 week  Removed pressure dressing  Wound site looks clean, dry, intact  Advised he keep it dry for now and avoid touching it  Follow-up with device clinic 3/22/2023 as scheduled  Continue nadolol  We will have patient follow-up with electrophysiology for surveillance  Continue Xarelto for stroke prevention in the setting of paroxysmal atrial fibrillation/flutter  No evidence of volume overload on examination  Okay to continue off loop diuretic for now  Continue spironolactone 25 mg daily, losartan 50 mg daily, nadolol 80 mg daily  We will plan to repeat echocardiogram in 3 months  He was drinking heavily on a daily basis prior to his presentation to the hospital   Strongly advised that he cut out alcohol and marijuana completely  We will check BMP/NT proBNP in 3 weeks and follow-up with him in 4 weeks  We will plan to repeat echocardiogram in 3 months after optimizing medications    Cardiac rehabilitation  Continue atorvastatin for dyslipidemia        Follow-up in 1 month after BMP, magnesium, NT proBNP      Total visit time estimated 45 minutes        Interval History:     Conrado Cantu is a 81 y o  male with hypertension who has to be on minoxidil in the past  [facility] also has a history of prolonged QT interval, and has been seeing Dr Anastasia Stroud in the past  Vimalclaudia Drummond has also been following Dr Ann Bragg a pulmonary medicine for pulmonary hypertension and sleep apnea      He was hospitalized with severe COVID-19 pneumonia 01/2021 at which time our group was consulted due to bradycardia and short runs of nonsustained ventricular tachycardia   He was maintained on metoprolol and QT prolonging agents were avoided       He presents to the hospital 3/6/2023 after an unresponsive episode at home  EMS apparently noticed ventricular fibrillation and gave him 1 shock and started CPR  He was not intubated and presented to the ER lethargic and confused  He was in a narrow complex regular tachycardia, likely SVT  He was given adenosine with resolution of his arrhythmia, back into sinus rhythm  QT interval was initially within normal limits, but subsequently started becoming progressively more prolonged  In the morning of 3/7/2023 he had torsades with resultant ventricular fibrillation  He spontaneously converted back to sinus rhythm and was then started on a lidocaine infusion  He underwent coronary angiography 3/7/2023 revealing no obstructive CAD  A temporary pacing wire was placed for recurrent torsades  He was transferred to [facility] and was thought to have LQT2, with possible torsades precipitated at home due to SSRI and antibiotic use  He was initiated on nadolol  He was noted to have paroxysmal atrial fibrillation/flutter and was placed on Xarelto anticoagulation  Echocardiogram 3/9/2023 revealed left ventricle ejection fraction 30%  This was thought to be a nonischemic cardiomyopathy possibly due to stress  On 3/8/2023, he underwent placement of a [employer] dual-chamber AICD with an RA lead, RV lead, and LV/his lead  He was evaluated by the heart failure service and initiated on losartan and spironolactone  He was maintained on nadolol with plans to repeat echocardiogram in 3 months  He presents today for follow-up    From a symptomatic standpoint he states he feels well  He still has some chest discomfort when he coughs  He denies shortness of breath, lower extreme edema, palpitations, lightheadedness, dizziness  He is requesting to remove his pacemaker pressure bandage today  Vitals:  Vitals:    03/15/23 0915   BP: 138/78   BP Location: Left arm   Patient Position: Sitting   Cuff Size: Large   Pulse: 76   Weight: 90 7 kg (200 lb)         Past Medical History:   Diagnosis Date   • Anxiety    • BPH (benign prostatic hypertrophy)    • Cancer (HCC)     CLL   • CLL (chronic lymphocytic leukemia) ([place] [address] )     2009   • Colon polyp    • Concussion     Resolved: 08/22/16   • Depression    • Diverticulitis 1/13/2020 2014 CT done 11/19 12/19 CT done    • E coli bacteremia 6/27/2021 2/2 blood cultures with Adwoa Justice appears to be the urine    • Epididymitis 5/19/2021 5/2021   • Gastrointestinal hemorrhage     Last assessed: 08/27/13   • GERD (gastroesophageal reflux disease)    • H/O vitamin D deficiency 7/7/2021   • Hearing loss of aging    • Hiatal hernia    • History of right hip replacement 4/19/2021   • History of transfusion    • Hyperlipidemia    • Hypertension    • Hyponatremia 3/6/2023   • Iron deficiency anemia    • Microscopic hematuria     Last assessed: 06/28/13   • OA (osteoarthritis)     right hip   • Pneumothorax    • Primary osteoarthritis of right hip 9/29/2017    He is status post right hip arthroplasty   • Prostatitis    • Pulmonary hypertension ([place] [address] )    • Seasonal allergies    • Urinary tract infection associated with catheterization of urinary tract ([place] [address] ) 2/5/2021    Pseudomonal UTI asymptomatic  Per Urology do not treat at this time  • Urinary tract infection associated with catheterization of urinary tract ([place] [address] ) 2/5/2021    Pseudomonal UTI asymptomatic  Per Urology do not treat at this time    He did have urosepsis from E coli 7/21   • UTI (urinary tract infection)     in past   • Wears glasses      Social History Socioeconomic History   • Marital status: /Civil Union     Spouse name: Not on file   • Number of children: Not on file   • Years of education: Not on file   • Highest education level: Not on file   Occupational History   • Not on file   Tobacco Use   • Smoking status: Former     Packs/day: 1 00     Years: 15 00     Pack years: 15 00     Types: Cigarettes     Start date: 67     Quit date: 1980     Years since quittin 5   • Smokeless tobacco: Never   Vaping Use   • Vaping Use: Never used   Substance and Sexual Activity   • Alcohol use: Yes     Alcohol/week: 10 0 standard drinks     Types: 10 Cans of beer per week   • Drug use: Yes     Types: Marijuana     Comment: "medical marijuana"   • Sexual activity: Not Currently   Other Topics Concern   • Not on file   Social History Narrative   • Not on file     Social Determinants of Health     Financial Resource Strain: Low Risk    • Difficulty of Paying Living Expenses: Not hard at all   Food Insecurity: No Food Insecurity   • Worried About Running Out of Food in the Last Year: Never true   • Ran Out of Food in the Last Year: Never true   Transportation Needs: No Transportation Needs   • Lack of Transportation (Medical): No   • Lack of Transportation (Non-Medical): No   Physical Activity: Not on file   Stress: Not on file   Social Connections: Not on file   Intimate Partner Violence: Not on file   Housing Stability: Low Risk    • Unable to Pay for Housing in the Last Year: No   • Number of Places Lived in the Last Year: 1   • Unstable Housing in the Last Year: No      Family History   Problem Relation Age of Onset   • No Known Problems Mother    • Heart attack Father    • Diabetes Brother    • Prostate cancer Brother      Past Surgical History:   Procedure Laterality Date   • BLADDER SURGERY     • CARDIAC CATHETERIZATION Left 3/7/2023    Procedure: Cardiac Left Heart Cath;  Surgeon: Allison Denise MD;  Location: [facility];   Service: Cardiology • CARDIAC CATHETERIZATION N/A 3/7/2023    Procedure: Cardiac temporary pacemaker;  Surgeon: Jane Red MD;  Location: [facility]; Service: Cardiology   • CARDIAC ELECTROPHYSIOLOGY PROCEDURE N/A 3/8/2023    Procedure: Cardiac icd implant;  Surgeon: Julius Saul MD;  Location:  CARDIAC CATH LAB;   Service: Cardiology   • COLONOSCOPY     • CYSTOSCOPY  10/09/2014    Diagnostic   • CYSTOSCOPY  06/29/2020   • FL CYSTOGRAM  08/15/2022   • FRACTURE SURGERY      left lower arm   • FRACTURE SURGERY      left femur   • HERNIA REPAIR     • JOINT REPLACEMENT Right     hip   • OTHER SURGICAL HISTORY  03/2011    Spinal anesthesia epidural   • WA ARTHRP ACETBLR/PROX FEM PROSTC AGRFT/ALGRFT Right 09/29/2017    Procedure: ARTHROPLASTY HIP TOTAL ANTERIOR;  Surgeon: Margareth Deleon MD;  Location: [facility];  Service: Orthopedics   • TONSILLECTOMY AND ADENOIDECTOMY     • TRANSURETHRAL RESECTION OF PROSTATE      x 2   • WRIST SURGERY         Current Outpatient Medications:   •  ascorbic acid (VITAMIN C) 250 MG CHEW, Chew 250 mg 2 (two) times a day, Disp: , Rfl:   •  atorvastatin (LIPITOR) 20 mg tablet, TAKE ONE TABLET BY MOUTH ONCE EVERY DAY, Disp: 90 tablet, Rfl: 3  •  Blood Pressure Monitoring (Omron 5 Series BP Monitor) PEE, , Disp: , Rfl:   •  cetirizine (ZyrTEC) 10 mg tablet, Take 10 mg by mouth as needed  , Disp: , Rfl:   •  gabapentin (NEURONTIN) 100 mg capsule, Take 1 capsule (100 mg total) by mouth daily at bedtime, Disp: 30 capsule, Rfl: 0  •  lidocaine (LIDODERM) 5 %, Apply 1 patch topically over 12 hours daily Remove & Discard patch within 12 hours or as directed by MD, Disp: 30 patch, Rfl: 0  •  LORazepam (Ativan) 0 5 mg tablet, Take 1 tablet (0 5 mg total) by mouth every 8 (eight) hours as needed for anxiety, Disp: 30 tablet, Rfl: 0  •  losartan (COZAAR) 50 mg tablet, Take 1 tablet (50 mg total) by mouth daily, Disp: 30 tablet, Rfl: 0  •  montelukast (SINGULAIR) 10 mg tablet, TAKE ONE TABLET BY MOUTH DAILY (Patient taking differently: Take 10 mg by mouth as needed), Disp: 90 tablet, Rfl: 3  •  nadolol (CORGARD) 80 MG tablet, Take 1 tablet (80 mg total) by mouth daily in the early morning Do not start before March 14, 2023 , Disp: 30 tablet, Rfl: 0  •  Omega-3 Fatty Acids (FISH OIL) 1,000 mg, Take by mouth daily, Disp: , Rfl:   •  omeprazole (PriLOSEC) 40 MG capsule, Take one capsule by mouth once every day , Disp: 90 capsule, Rfl: 3  •  rivaroxaban (XARELTO) 20 mg tablet, Take 1 tablet (20 mg total) by mouth daily with breakfast, Disp: 30 tablet, Rfl: 0  •  senna-docusate sodium (SENOKOT S) 8 6-50 mg per tablet, Take 2 tablets by mouth 2 (two) times a day (Patient taking differently: Take 2 tablets by mouth as needed), Disp: , Rfl: 0  •  spironolactone (ALDACTONE) 25 mg tablet, Take 1 tablet (25 mg total) by mouth daily, Disp: 30 tablet, Rfl: 0  •  vitamin B-12 (CYANOCOBALAMIN) 500 MCG TABS, Take 1 tablet (500 mcg total) by mouth daily, Disp: 30 tablet, Rfl: 0  •  Acetaminophen 500 MG, Take 1,000 mg by mouth prn, Disp: , Rfl:         Review of Systems:  Review of Systems   Respiratory: Negative  Cardiovascular: Positive for chest pain  All other systems reviewed and are negative  Physical Exam:  Physical Exam  Constitutional:       General: He is not in acute distress  Appearance: He is well-developed  He is not diaphoretic  HENT:      Head: Normocephalic and atraumatic  Eyes:      General: No scleral icterus  Right eye: No discharge  Pupils: Pupils are equal, round, and reactive to light  Neck:      Thyroid: No thyromegaly  Cardiovascular:      Rate and Rhythm: Normal rate and regular rhythm  Heart sounds: Normal heart sounds  No murmur heard  No friction rub  No gallop  Pulmonary:      Effort: Pulmonary effort is normal       Breath sounds: Normal breath sounds  Abdominal:      General: There is no distension  Tenderness: There is no abdominal tenderness   There is no guarding or rebound  Musculoskeletal:         General: Normal range of motion  Cervical back: Normal range of motion and neck supple  Skin:     General: Skin is warm and dry  Coloration: Skin is not pale  Findings: No erythema or rash  Comments: Pacemaker site appears clean, dry, intact   Neurological:      Mental Status: He is alert and oriented to person, place, and time  Coordination: Coordination normal    Psychiatric:         Behavior: Behavior normal          Thought Content: Thought content normal          Judgment: Judgment normal          This note was completed in part utilizing [employer] Software  Grammatical errors, random word insertions, spelling mistakes, and incomplete sentences can be an occasional consequence of this system secondary to software limitations, ambient noise, and hardware issues  If you have any questions or concerns about the content, text, or information contained within the body of this dictation, please contact the provider for clarification

## 2023-03-16 ENCOUNTER — TELEPHONE (OUTPATIENT)
Dept: CARDIOLOGY CLINIC | Facility: CLINIC | Age: 73
End: 2023-03-16

## 2023-03-16 NOTE — TELEPHONE ENCOUNTER
Pt calling states was seen yesterday by Dr Ramila Seymour  Pt states he feels like he is not progressing like he should  He states gets SOB going between rooms at home  Trouble sleeping feels may be due to pain from having CPR done  He states he may just be inpatient with all that occurred  Returned call to patient and he states they took away his Zoloft and is asking for some type of anxiety medication  He is seeing his PCP on Monday and will ask discuss with him  He also states is anxious about his device  He has device site check next week and told him they will review his remote checking device with him  He was happy for the return call and states he just wanted to go watch [employer] out   but knows it is too soon   told him would be best to watch from his home   he had a major occurrence and needs to give him self a chance to recover, he agreed  He will be going to rehab which I told will help him regain his strength too  ANJALI  Told him would review above with Dr Ramila Seymour

## 2023-03-17 ENCOUNTER — TELEPHONE (OUTPATIENT)
Dept: CARDIOLOGY CLINIC | Facility: CLINIC | Age: 73
End: 2023-03-17

## 2023-03-17 NOTE — TELEPHONE ENCOUNTER
Pt calling having questions about his remote monitor  He was in for OV on Wed, spoke with patient yesterday  He is very anxious about new device  He states his monitor is showing a message, he ask if he needs to call 911, he feels fine, reassured pt that I would have the device clinic give him a call  He states he thought last night maybe hs device went off/or he may have had a dream about it  Please call patient he has site check next week  Thank you

## 2023-03-17 NOTE — TELEPHONE ENCOUNTER
Patient called, multiple questions answered (very anxious, nervous and with little education ) regarding ICD function, living with an ICD etc reviewed with verbal understanding  Manual push transmission done with patient and results reviewed verbally - normal function, leads appear stable, no events  Again, reassurance given to patient (20+ mins conversation)  2 week site check scheduled next week @ [facility]

## 2023-03-18 NOTE — EMTALA/ACUTE CARE TRANSFER
[facility] EMERGENCY DEPARTMENT  [address] [address]  Dept: [phone]      EMTALA TRANSFER CONSENT    NAME Laurent Zamora                                         1950                              MRN [id number]    I have been informed of my rights regarding examination, treatment, and transfer   by Dr Gayle Jean DO    Benefits: Specialized equipment and/or services available at the receiving facility (Include comment)________________________    Risks: Potential for delay in receiving treatment, Potential deterioration of medical condition, Loss of IV, Increased discomfort during transfer, Possible worsening of condition or death during transfer      Consent for Transfer:  I acknowledge that my medical condition has been evaluated and explained to me by the emergency department physician or other qualified medical person and/or my attending physician, who has recommended that I be transferred to the service of  Accepting Physician: Dr Venkatesh Rivera at [address] [place] [address] Name, [address] : [address] [facility] [address] [facility]  The above potential benefits of such transfer, the potential risks associated with such transfer, and the probable risks of not being transferred have been explained to me, and I fully understand them  The doctor has explained that, in my case, the benefits of transfer outweigh the risks  I agree to be transferred  I authorize the performance of emergency medical procedures and treatments upon me in both transit and upon arrival at the receiving facility  Additionally, I authorize the release of any and all medical records to the receiving facility and request they be transported with me, if possible  I understand that the safest mode of transportation during a medical emergency is an ambulance and that the Hospital advocates the use of this mode of transport   Risks of traveling to the receiving facility by car, including absence of medical control, life sustaining equipment, such as oxygen, and medical personnel has been explained to me and I fully understand them  (MADONNA CORRECT BOX BELOW)  [  ]  I consent to the stated transfer and to be transported by ambulance/helicopter  [  ]  I consent to the stated transfer, but refuse transportation by ambulance and accept full responsibility for my transportation by car  I understand the risks of non-ambulance transfers and I exonerate the Hospital and its staff from any deterioration in my condition that results from this refusal     X___________________________________________    DATE  01/10/21  TIME________  Signature of patient or legally responsible individual signing on patient behalf           RELATIONSHIP TO PATIENT_________________________          Provider Certification    NAME Esau Casanova                                         1950                              MRN [id number]    A medical screening exam was performed on the above named patient  Based on the examination:    Condition Necessitating Transfer The primary encounter diagnosis was Pneumonia due to COVID-19 virus  Diagnoses of Hypoxia, Acute respiratory failure with hypoxemia ([facility] [address] ), Hyponatremia, and Leukocytosis were also pertinent to this visit      Patient Condition: The patient has been stabilized such that within reasonable medical probability, no material deterioration of the patient condition or the condition of the unborn child(sonja) is likely to result from the transfer    Reason for Transfer: Level of Care needed not available at this facility, Patient/Family request    Transfer Requirements: [address]  [facility]   · Space available and qualified personnel available for treatment as acknowledged by    · Agreed to accept transfer and to provide appropriate medical treatment as acknowledged by       Dr Carla Arellano  · Appropriate medical records of the examination and treatment of the patient are provided at the time of transfer   [address] _______  · Transfer will be performed by qualified personnel from    and appropriate transfer equipment as required, including the use of necessary and appropriate life support measures  Provider Certification: I have examined the patient and explained the following risks and benefits of being transferred/refusing transfer to the patient/family:  General risk, such as traffic hazards, adverse weather conditions, rough terrain or turbulence, possible failure of equipment (including vehicle or aircraft), or consequences of actions of persons outside the control of the transport personnel      Based on these reasonable risks and benefits to the patient and/or the unborn child(sonja), and based upon the information available at the time of the patients examination, I certify that the medical benefits reasonably to be expected from the provision of appropriate medical treatments at another medical facility outweigh the increasing risks, if any, to the individuals medical condition, and in the case of labor to the unborn child, from effecting the transfer      X____________________________________________ DATE 01/10/21        TIME_______      ORIGINAL - SEND TO MEDICAL RECORDS   COPY - SEND WITH PATIENT DURING TRANSFER show

## 2023-03-20 ENCOUNTER — OFFICE VISIT (OUTPATIENT)
Dept: FAMILY MEDICINE CLINIC | Facility: CLINIC | Age: 73
End: 2023-03-20

## 2023-03-20 ENCOUNTER — TELEPHONE (OUTPATIENT)
Dept: CARDIOLOGY CLINIC | Facility: CLINIC | Age: 73
End: 2023-03-20

## 2023-03-20 VITALS
WEIGHT: 194.6 LBS | DIASTOLIC BLOOD PRESSURE: 82 MMHG | SYSTOLIC BLOOD PRESSURE: 130 MMHG | BODY MASS INDEX: 29.59 KG/M2 | HEART RATE: 73 BPM | TEMPERATURE: 96.8 F | OXYGEN SATURATION: 100 %

## 2023-03-20 DIAGNOSIS — I27.20 PULMONARY HYPERTENSION (HCC): Chronic | ICD-10-CM

## 2023-03-20 DIAGNOSIS — K21.9 GASTROESOPHAGEAL REFLUX DISEASE WITHOUT ESOPHAGITIS: Chronic | ICD-10-CM

## 2023-03-20 DIAGNOSIS — F41.8 SITUATIONAL ANXIETY: ICD-10-CM

## 2023-03-20 DIAGNOSIS — I10 ESSENTIAL HYPERTENSION: Chronic | ICD-10-CM

## 2023-03-20 DIAGNOSIS — E78.00 HYPERCHOLESTEROLEMIA: Chronic | ICD-10-CM

## 2023-03-20 DIAGNOSIS — K31.819 GAVE (GASTRIC ANTRAL VASCULAR ECTASIA): ICD-10-CM

## 2023-03-20 DIAGNOSIS — I50.9 NEW ONSET OF CONGESTIVE HEART FAILURE (HCC): ICD-10-CM

## 2023-03-20 DIAGNOSIS — D50.9 IRON DEFICIENCY ANEMIA, UNSPECIFIED IRON DEFICIENCY ANEMIA TYPE: Chronic | ICD-10-CM

## 2023-03-20 DIAGNOSIS — R40.20 LOSS OF CONSCIOUSNESS (HCC): ICD-10-CM

## 2023-03-20 DIAGNOSIS — F33.9 DEPRESSION, RECURRENT (HCC): ICD-10-CM

## 2023-03-20 DIAGNOSIS — C91.10 CLL (CHRONIC LYMPHOCYTIC LEUKEMIA) (HCC): Chronic | ICD-10-CM

## 2023-03-20 DIAGNOSIS — I45.81 LONG QT SYNDROME TYPE 2: Primary | ICD-10-CM

## 2023-03-20 DIAGNOSIS — D59.10 AUTOIMMUNE HEMOLYTIC ANEMIA (HCC): ICD-10-CM

## 2023-03-20 DIAGNOSIS — I49.01 VENTRICULAR FIBRILLATION (HCC): ICD-10-CM

## 2023-03-20 DIAGNOSIS — I48.0 PAROXYSMAL A-FIB (HCC): ICD-10-CM

## 2023-03-20 DIAGNOSIS — Z12.5 PROSTATE CANCER SCREENING: ICD-10-CM

## 2023-03-20 DIAGNOSIS — I47.1 NARROW COMPLEX TACHYCARDIA (HCC): ICD-10-CM

## 2023-03-20 DIAGNOSIS — R56.9 SEIZURE-LIKE ACTIVITY (HCC): ICD-10-CM

## 2023-03-20 PROBLEM — R41.89 UNRESPONSIVE EPISODE: Status: RESOLVED | Noted: 2023-03-07 | Resolved: 2023-03-20

## 2023-03-20 PROBLEM — E66.811 OBESITY (BMI 30.0-34.9): Status: RESOLVED | Noted: 2022-04-07 | Resolved: 2023-03-20

## 2023-03-20 PROBLEM — E66.9 OBESITY (BMI 30.0-34.9): Status: RESOLVED | Noted: 2022-04-07 | Resolved: 2023-03-20

## 2023-03-20 PROBLEM — R40.4 UNRESPONSIVE EPISODE: Status: RESOLVED | Noted: 2023-03-07 | Resolved: 2023-03-20

## 2023-03-20 PROBLEM — A41.9 SEPSIS (HCC): Status: RESOLVED | Noted: 2023-03-06 | Resolved: 2023-03-20

## 2023-03-20 RX ORDER — LORAZEPAM 0.5 MG/1
0.5 TABLET ORAL EVERY 8 HOURS PRN
Qty: 30 TABLET | Refills: 0 | Status: SHIPPED | OUTPATIENT
Start: 2023-03-20

## 2023-03-20 NOTE — TELEPHONE ENCOUNTER
Patient called and questions answered with verbal understanding  Patient alert tone will be cleared at 2 week site check, 3/22/23

## 2023-03-20 NOTE — TELEPHONE ENCOUNTER
Hi, this is [facility]  I spoke with Jennifer Adhikari on Friday with this metronics machine  I just a newly recipient of a pacemaker and I'm not sure what Jennifer Adhikari had said on Friday, but I know she went through everything and the master  Everything is working properly  She said everything's good  However, I don't remember her saying that I would automatically be put with my pacemaker like every morning around 10:00 o'clock  I don't know if that's just showing that it's working because I have an appointment with you people around 1:00 o'clock on Wednesday  Over there on [address]  So I would like someone to please reach out to me and let me know why that's doing that or if that's a normal thing to transmit data to you people that everything's fine  So I'm just curious so you can, I can be reached, call my home phone please at [phone]  Thank you  Please get back to me today  Thank you   Bye

## 2023-03-20 NOTE — ASSESSMENT & PLAN NOTE
As of this time, he has stopped drinking alcohol altogether and is just having an occasional nonalcoholic beer

## 2023-03-20 NOTE — ASSESSMENT & PLAN NOTE
Continue close follow-up with cardiology  He does appear to be in sinus rhythm today  His new ICD/pacemaker site looks intact with no signs of significant hematoma

## 2023-03-20 NOTE — ASSESSMENT & PLAN NOTE
His most recent loss of consciousness seems to be related to his prolonged QT syndrome and V  tach  Continue close follow-up with cardiology

## 2023-03-20 NOTE — PROGRESS NOTES
Assessment/Plan:       Problem List Items Addressed This Visit        Digestive    Esophageal reflux (Chronic)     Stable on PPI  GAVE (gastric antral vascular ectasia)     Continue follow-up with GI  Cardiovascular and Mediastinum    Essential hypertension (Chronic)    Relevant Orders    Comprehensive metabolic panel    Pulmonary hypertension (HCC) (Chronic)     He sees pulmonary but is quite stable in this department  Relevant Orders    Comprehensive metabolic panel    Lipid Panel with Direct LDL reflex    Ventricular fibrillation ([facility] )     Continue close follow-up with cardiology  He does appear to be in sinus rhythm today  His new ICD/pacemaker site looks intact with no signs of significant hematoma  Relevant Orders    Lipid Panel with Direct LDL reflex    Paroxysmal A-fib ([facility] )     He is now on anticoagulation  He is status post pacemaker/ICD  Relevant Orders    Lipid Panel with Direct LDL reflex    TSH, 3rd generation with Free T4 reflex    Long QT syndrome type 2 - Primary    Relevant Orders    Comprehensive metabolic panel    Lipid Panel with Direct LDL reflex    New onset of congestive heart failure (HCC)    Relevant Orders    Comprehensive metabolic panel    TSH, 3rd generation with Free T4 reflex       Other    CLL (chronic lymphocytic leukemia) (HCC) (Chronic)     Continue close follow-up with cardiology  Relevant Orders    Comprehensive metabolic panel    Iron deficiency anemia (Chronic)     Continue close follow-up with oncology         Hypercholesterolemia (Chronic)    Relevant Orders    Lipid Panel with Direct LDL reflex    Depression, recurrent ([facility] [address] )     He is currently off of Zoloft and doing well  This was stopped while he was hospitalized for his prolonged QT 3/23  Relevant Orders    TSH, 3rd generation with Free T4 reflex    Autoimmune hemolytic anemia ([facility] )     Continue close follow-up with hematology           Seizure-like activity [facility])    Relevant Orders    Comprehensive metabolic panel    Narrow complex tachycardia ([facility] [address] )     He is in sinus rhythm today  Relevant Orders    Comprehensive metabolic panel    TSH, 3rd generation with Free T4 reflex    Loss of consciousness ([facility] [address] )     His most recent loss of consciousness seems to be related to his prolonged QT syndrome and V  tach  Continue close follow-up with cardiology  Other Visit Diagnoses     Prostate cancer screening        Relevant Orders    PSA, Total Screen            Subjective:      Patient ID: Batsheva Palacios is a 81 y o  male  HPI   TCM Call     Date and time call was made  3/13/2023  2:30 PM    Hospital care reviewed  Records reviewed    Patient was hospitialized at  [facility] - CLOSED    Date of Admission  03/07/23    Date of discharge  03/13/23    Diagnosis  Prolonged QT Interval    Disposition  Home    Were the patients medications reviewed and updated  Yes    Current Symptoms  None    Shortness of breath severity  Mild    Weakness severity  Mild    Fatigue severity  Moderate      TCM Call     Post hospital issues  None    Should patient be enrolled in anticoag monitoring? Yes    Scheduled for follow up? Yes  3 20 23 Dr Nelly Knowles    Did you obtain your prescribed medications  Yes    Do you need help managing your prescriptions or medications  No    Is transportation to your appointment needed  No    I have advised the patient to call PCP with any new or worsening symptoms  ISMAEL Hodgson    Living Arrangements  Alone    Are you recieving any outpatient services  Yes    What type of services  [address]    Are you recieving home care services  Yes    Types of home care services  Hospice; Nurse visit        Patient presents today for a TCM  He presented to the hospital after a loss of consciousness  EMS was called to the scene and he did receive some CPR and a shock on site  He was noted to have ventricular fibrillation as well as atrial fibrillation  He ultimately was diagnosed with long QT syndrome and now has a pacemaker intact  Sertraline was stopped in the hospital as they felt that may have contributed to prolonged QT syndrome   , His mood is stable at this time  He is following closely with oncology for his history of      The following portions of the patient's history were reviewed and updated as appropriate: allergies, current medications, past family history, past medical history, past social history, past surgical history and problem list       Current Outpatient Medications:   •  Acetaminophen 500 MG, Take 1,000 mg by mouth prn, Disp: , Rfl:   •  ascorbic acid (VITAMIN C) 250 MG CHEW, Chew 250 mg 2 (two) times a day, Disp: , Rfl:   •  atorvastatin (LIPITOR) 20 mg tablet, TAKE ONE TABLET BY MOUTH ONCE EVERY DAY, Disp: 90 tablet, Rfl: 3  •  Blood Pressure Monitoring (Omron 5 Series BP Monitor) PEE, , Disp: , Rfl:   •  cetirizine (ZyrTEC) 10 mg tablet, Take 10 mg by mouth as needed  , Disp: , Rfl:   •  lidocaine (LIDODERM) 5 %, Apply 1 patch topically over 12 hours daily Remove & Discard patch within 12 hours or as directed by MD, Disp: 30 patch, Rfl: 0  •  LORazepam (Ativan) 0 5 mg tablet, Take 1 tablet (0 5 mg total) by mouth every 8 (eight) hours as needed for anxiety, Disp: 30 tablet, Rfl: 0  •  losartan (COZAAR) 50 mg tablet, Take 1 tablet (50 mg total) by mouth daily, Disp: 30 tablet, Rfl: 0  •  montelukast (SINGULAIR) 10 mg tablet, TAKE ONE TABLET BY MOUTH DAILY (Patient taking differently: Take 10 mg by mouth as needed), Disp: 90 tablet, Rfl: 3  •  nadolol (CORGARD) 80 MG tablet, Take 1 tablet (80 mg total) by mouth daily in the early morning Do not start before March 14, 2023 , Disp: 30 tablet, Rfl: 0  •  Omega-3 Fatty Acids (FISH OIL) 1,000 mg, Take by mouth daily, Disp: , Rfl:   •  omeprazole (PriLOSEC) 40 MG capsule, Take one capsule by mouth once every day , Disp: 90 capsule, Rfl: 3  •  rivaroxaban (XARELTO) 20 mg tablet, Take 1 tablet (20 mg total) by mouth daily with breakfast, Disp: 30 tablet, Rfl: 0  •  senna-docusate sodium (SENOKOT S) 8 6-50 mg per tablet, Take 2 tablets by mouth 2 (two) times a day (Patient taking differently: Take 2 tablets by mouth as needed), Disp: , Rfl: 0  •  spironolactone (ALDACTONE) 25 mg tablet, Take 1 tablet (25 mg total) by mouth daily, Disp: 30 tablet, Rfl: 0  •  vitamin B-12 (CYANOCOBALAMIN) 500 MCG TABS, Take 1 tablet (500 mcg total) by mouth daily, Disp: 30 tablet, Rfl: 0     Review of Systems   Constitutional: Negative for appetite change, chills, fatigue, fever and unexpected weight change  HENT: Negative for trouble swallowing  Eyes: Negative for visual disturbance  Respiratory: Negative for cough, chest tightness, shortness of breath and wheezing  Cardiovascular: Negative for chest pain, palpitations and leg swelling  Gastrointestinal: Negative for abdominal distention, abdominal pain, blood in stool, constipation and diarrhea  Endocrine: Negative for polyuria  Genitourinary: Negative for difficulty urinating and flank pain  Musculoskeletal: Negative for arthralgias and myalgias  Skin: Negative for rash  Neurological: Negative for dizziness and light-headedness  Hematological: Negative for adenopathy  Does not bruise/bleed easily  Psychiatric/Behavioral: Negative for dysphoric mood and sleep disturbance  The patient is not nervous/anxious  Objective:      /82 (BP Location: Left arm, Patient Position: Sitting, Cuff Size: Adult)   Pulse 73   Temp (!) 96 8 °F (36 °C) (Tympanic)   Wt 88 3 kg (194 lb 9 6 oz)   SpO2 100%   BMI 29 59 kg/m²          Physical Exam  Vitals reviewed  Constitutional:       Appearance: Normal appearance  He is well-developed  Comments: pale   HENT:      Head: Normocephalic  Cardiovascular:      Rate and Rhythm: Regular rhythm  Heart sounds: Normal heart sounds  No murmur heard    Pulmonary:      Effort: No respiratory distress  Breath sounds: No wheezing or rales  Abdominal:      General: There is no distension  Tenderness: There is no abdominal tenderness  Skin:     Findings: No erythema or rash  Neurological:      Mental Status: He is alert and oriented to person, place, and time             Ritika Tamayo MD

## 2023-03-20 NOTE — ASSESSMENT & PLAN NOTE
He is currently off of Zoloft and doing well  This was stopped while he was hospitalized for his prolonged QT 3/23

## 2023-03-22 ENCOUNTER — TELEPHONE (OUTPATIENT)
Dept: FAMILY MEDICINE CLINIC | Facility: CLINIC | Age: 73
End: 2023-03-22

## 2023-03-22 ENCOUNTER — IN-CLINIC DEVICE VISIT (OUTPATIENT)
Dept: CARDIOLOGY CLINIC | Facility: CLINIC | Age: 73
End: 2023-03-22

## 2023-03-22 DIAGNOSIS — Z95.810 PRESENCE OF BIVENTRICULAR IMPLANTABLE CARDIOVERTER-DEFIBRILLATOR (ICD): Primary | ICD-10-CM

## 2023-03-22 NOTE — TELEPHONE ENCOUNTER
Pharmacy requesting that the cancellation of sertraline 50 mg either be taken back or that the pt is given a 25 mg refill to slowly get off of the medication as the pt no longer feels like himself

## 2023-03-22 NOTE — PROGRESS NOTES
Results for orders placed or performed in visit on 03/22/23   Cardiac EP device report    Narrative    MDT BI-V ICD (DDIR) [address] [place] [address]  INTERROGATED IN THE [facility] OFFICE  BATTERY VOLTAGE ADEQUATE (5 9 YRS)  AP 39 4% BVP 88 8% (VSRP 11 2%) ALL LEAD PARAMETERS WITHIN NORMAL LIMITS  ONE NEW AT/AF EPISODE WITH EGM SHOWING AFLUTTER LASTING 9 MINS  AT/AF BURDEN 6 8%  [address]  NO PROGRAMMING CHANGES MADE TO DEVICE PARAMETERS  OPTIVOL INITIALIZING  WOUND CHECK: INCISION CLEAN AND DRY WITH EDGES APPROXIMATED; WOUND CARE AND RESTRICTIONS REVIEWED WITH PATIENT  NORMAL DEVICE FUNCTION   AM/MACK

## 2023-03-23 NOTE — TELEPHONE ENCOUNTER
I called back the pharmacy and let them know that he would be well past any withdrawal from stopping the medication  The pharmacy was who contacted us yesterday with this concern so I wanted to relay this message to them as well to the pt

## 2023-03-28 ENCOUNTER — TELEPHONE (OUTPATIENT)
Dept: FAMILY MEDICINE CLINIC | Facility: CLINIC | Age: 73
End: 2023-03-28

## 2023-03-28 ENCOUNTER — APPOINTMENT (OUTPATIENT)
Dept: LAB | Facility: MEDICAL CENTER | Age: 73
End: 2023-03-28

## 2023-03-28 ENCOUNTER — TELEPHONE (OUTPATIENT)
Dept: CARDIAC REHAB | Age: 73
End: 2023-03-28

## 2023-03-28 ENCOUNTER — OFFICE VISIT (OUTPATIENT)
Dept: FAMILY MEDICINE CLINIC | Facility: CLINIC | Age: 73
End: 2023-03-28

## 2023-03-28 VITALS
DIASTOLIC BLOOD PRESSURE: 82 MMHG | HEART RATE: 91 BPM | TEMPERATURE: 99.3 F | SYSTOLIC BLOOD PRESSURE: 130 MMHG | BODY MASS INDEX: 29.28 KG/M2 | WEIGHT: 192.6 LBS | OXYGEN SATURATION: 98 %

## 2023-03-28 DIAGNOSIS — F32.A ANXIETY AND DEPRESSION: ICD-10-CM

## 2023-03-28 DIAGNOSIS — I48.0 PAROXYSMAL A-FIB (HCC): Primary | ICD-10-CM

## 2023-03-28 DIAGNOSIS — D64.9 ANEMIA, UNSPECIFIED TYPE: ICD-10-CM

## 2023-03-28 DIAGNOSIS — R21 RASH: ICD-10-CM

## 2023-03-28 DIAGNOSIS — F41.9 ANXIETY AND DEPRESSION: ICD-10-CM

## 2023-03-28 DIAGNOSIS — D59.10 AUTOIMMUNE HEMOLYTIC ANEMIA (HCC): ICD-10-CM

## 2023-03-28 LAB
ANISOCYTOSIS BLD QL SMEAR: PRESENT
BASOPHILS # BLD MANUAL: 0 THOUSAND/UL (ref 0–0.1)
BASOPHILS NFR MAR MANUAL: 0 % (ref 0–1)
EOSINOPHIL # BLD MANUAL: 0 THOUSAND/UL (ref 0–0.4)
EOSINOPHIL NFR BLD MANUAL: 0 % (ref 0–6)
ERYTHROCYTE [DISTWIDTH] IN BLOOD BY AUTOMATED COUNT: 14.3 % (ref 11.6–15.1)
HCT VFR BLD AUTO: 36.3 % (ref 36.5–49.3)
HGB BLD-MCNC: 11.8 G/DL (ref 12–17)
LYMPHOCYTES # BLD AUTO: 23.69 THOUSAND/UL (ref 0.6–4.47)
LYMPHOCYTES # BLD AUTO: 89 % (ref 14–44)
MCH RBC QN AUTO: 34.6 PG (ref 26.8–34.3)
MCHC RBC AUTO-ENTMCNC: 32.5 G/DL (ref 31.4–37.4)
MCV RBC AUTO: 107 FL (ref 82–98)
MONOCYTES # BLD AUTO: 0.8 THOUSAND/UL (ref 0–1.22)
MONOCYTES NFR BLD: 3 % (ref 4–12)
NEUTROPHILS # BLD MANUAL: 2.13 THOUSAND/UL (ref 1.85–7.62)
NEUTS SEG NFR BLD AUTO: 8 % (ref 43–75)
PLATELET # BLD AUTO: 242 THOUSANDS/UL (ref 149–390)
PLATELET BLD QL SMEAR: ADEQUATE
PMV BLD AUTO: 10.8 FL (ref 8.9–12.7)
POIKILOCYTOSIS BLD QL SMEAR: PRESENT
RBC # BLD AUTO: 3.41 MILLION/UL (ref 3.88–5.62)
RBC MORPH BLD: PRESENT
SMUDGE CELLS BLD QL SMEAR: PRESENT
WBC # BLD AUTO: 26.62 THOUSAND/UL (ref 4.31–10.16)

## 2023-03-28 RX ORDER — TRIAMCINOLONE ACETONIDE 1 MG/G
CREAM TOPICAL 2 TIMES DAILY
Qty: 30 G | Refills: 0 | Status: SHIPPED | OUTPATIENT
Start: 2023-03-28 | End: 2023-03-31 | Stop reason: SDUPTHER

## 2023-03-28 NOTE — ASSESSMENT & PLAN NOTE
Looks like he developed an allergic reaction in form of rash and pruritus most likely due to Xarelto, we will stop it, switch to Eliquis 5 mg twice daily  His rate is controlled  Continue current dose of nadolol    Continue follow-up with cardiology and electrophysiology

## 2023-03-28 NOTE — ASSESSMENT & PLAN NOTE
He is baseline hemoglobin overall was within a good range but recently it was dropping, last blood work from March revealed hemoglobin of 8 7  He is also on blood thinners right now which is new for him  We will recheck his CBC  Continue follow-up with hematology

## 2023-03-28 NOTE — ASSESSMENT & PLAN NOTE
He was on Zoloft 50 mg daily in the past which was helping with his anxiety and depression  It was discontinued recently due to QTc prolongation and cardiac arrest   We will continue to hold off on that  He may discuss it with electrophysiology team if it is safe or not to restart it  In the meantime we agreed with him that we will continue with lorazepam 0 5 mg daily  We discussed with him that it is okay to take it twice a day as needed, hopefully when he will continue to improve in terms of his current problems his anxiety will improve as well

## 2023-03-28 NOTE — PROGRESS NOTES
Assessment/Plan:    Paroxysmal A-fib ([facility] )  Looks like he developed an allergic reaction in form of rash and pruritus most likely due to Xarelto, we will stop it, switch to Eliquis 5 mg twice daily  His rate is controlled  Continue current dose of nadolol  Continue follow-up with cardiology and electrophysiology    Autoimmune hemolytic anemia ([facility] )  He is baseline hemoglobin overall was within a good range but recently it was dropping, last blood work from March revealed hemoglobin of 8 7  He is also on blood thinners right now which is new for him  We will recheck his CBC  Continue follow-up with hematology  Anxiety and depression  He was on Zoloft 50 mg daily in the past which was helping with his anxiety and depression  It was discontinued recently due to QTc prolongation and cardiac arrest   We will continue to hold off on that  He may discuss it with electrophysiology team if it is safe or not to restart it  In the meantime we agreed with him that we will continue with lorazepam 0 5 mg daily  We discussed with him that it is okay to take it twice a day as needed, hopefully when he will continue to improve in terms of his current problems his anxiety will improve as well  Diagnoses and all orders for this visit:    Paroxysmal A-fib (HCC)  -     apixaban (Eliquis) 5 mg; Take 1 tablet (5 mg total) by mouth 2 (two) times a day    Rash  -     triamcinolone (KENALOG) 0 1 % cream; Apply topically 2 (two) times a day    Anemia, unspecified type  -     CBC and differential; Future    Autoimmune hemolytic anemia (HCC)    Anxiety and depression          Subjective:      Patient ID: Silvia Gonzales is a 81 y o  male  Patient came today for follow-up on his chronic problems and with a new complaint of itching and the rash after he was started on Xarelto        The following portions of the patient's history were reviewed and updated as appropriate: allergies, current medications, past family history, past medical history, past social history, past surgical history, and problem list     Review of Systems   Constitutional: Negative for chills and fever  Respiratory: Negative for cough, shortness of breath and wheezing  Cardiovascular: Negative for chest pain, palpitations and leg swelling  Skin: Positive for rash           Objective:      /82 (BP Location: Left arm, Patient Position: Sitting, Cuff Size: Adult)   Pulse 91   Temp 99 3 °F (37 4 °C) (Tympanic)   Wt 87 4 kg (192 lb 9 6 oz)   SpO2 98%   BMI 29 28 kg/m²     Allergies   Allergen Reactions   • Codeine Other (See Comments)     agitated   • Sulfamethoxazole-Trimethoprim GI Intolerance and Abdominal Pain     upset stomach          Current Outpatient Medications:   •  Acetaminophen 500 MG, Take 1,000 mg by mouth prn, Disp: , Rfl:   •  apixaban (Eliquis) 5 mg, Take 1 tablet (5 mg total) by mouth 2 (two) times a day, Disp: 60 tablet, Rfl: 3  •  ascorbic acid (VITAMIN C) 250 MG CHEW, Chew 250 mg 2 (two) times a day, Disp: , Rfl:   •  atorvastatin (LIPITOR) 20 mg tablet, TAKE ONE TABLET BY MOUTH ONCE EVERY DAY, Disp: 90 tablet, Rfl: 3  •  Blood Pressure Monitoring (Omron 5 Series BP Monitor) PEE, , Disp: , Rfl:   •  cetirizine (ZyrTEC) 10 mg tablet, Take 10 mg by mouth as needed  , Disp: , Rfl:   •  lidocaine (LIDODERM) 5 %, Apply 1 patch topically over 12 hours daily Remove & Discard patch within 12 hours or as directed by MD, Disp: 30 patch, Rfl: 0  •  LORazepam (Ativan) 0 5 mg tablet, Take 1 tablet (0 5 mg total) by mouth every 8 (eight) hours as needed for anxiety, Disp: 30 tablet, Rfl: 0  •  losartan (COZAAR) 50 mg tablet, Take 1 tablet (50 mg total) by mouth daily, Disp: 30 tablet, Rfl: 0  •  montelukast (SINGULAIR) 10 mg tablet, TAKE ONE TABLET BY MOUTH DAILY (Patient taking differently: Take 10 mg by mouth as needed), Disp: 90 tablet, Rfl: 3  •  nadolol (CORGARD) 80 MG tablet, Take 1 tablet (80 mg total) by mouth daily in the early morning Do not start before March 14, 2023 , Disp: 30 tablet, Rfl: 0  •  Omega-3 Fatty Acids (FISH OIL) 1,000 mg, Take by mouth daily, Disp: , Rfl:   •  omeprazole (PriLOSEC) 40 MG capsule, Take one capsule by mouth once every day , Disp: 90 capsule, Rfl: 3  •  senna-docusate sodium (SENOKOT S) 8 6-50 mg per tablet, Take 2 tablets by mouth 2 (two) times a day (Patient taking differently: Take 2 tablets by mouth as needed), Disp: , Rfl: 0  •  spironolactone (ALDACTONE) 25 mg tablet, Take 1 tablet (25 mg total) by mouth daily, Disp: 30 tablet, Rfl: 0  •  triamcinolone (KENALOG) 0 1 % cream, Apply topically 2 (two) times a day, Disp: 30 g, Rfl: 0  •  vitamin B-12 (CYANOCOBALAMIN) 500 MCG TABS, Take 1 tablet (500 mcg total) by mouth daily, Disp: 30 tablet, Rfl: 0     Patient Instructions   Please discontinue yoga Xarelto due to side effect  Use Eliquis 5 mg twice a day starting from tomorrow  Use cream to affected areas for your itching and rash  Avoid alcohol as for now  Please send me a message in 4 to 5 days when you will be running out on your lorazepam   We agreed with you that it is okay to take it sparingly 2 pills daily at night until you will be restarted on your Zolof  For now continue to hold your Zoloft, discuss it again with your electrophysiology team           Physical Exam  Constitutional:       General: He is not in acute distress  Appearance: He is not toxic-appearing  Cardiovascular:      Rate and Rhythm: Normal rate and regular rhythm  Heart sounds: Murmur heard  No gallop  Pulmonary:      Effort: No respiratory distress  Breath sounds: No wheezing or rales  Skin:     Findings: Rash (Multiple excoriations on the lower extremities, pruritic) present

## 2023-03-28 NOTE — TELEPHONE ENCOUNTER
Karolina Leslie from [address] called and said that the pt can't afford the Eliquis and would like to know what he should do  Is there any generic brand he could use, the pharmacy asked or alternative  Please advise, thank you!

## 2023-03-28 NOTE — PATIENT INSTRUCTIONS
Please discontinue yoga Xarelto due to side effect  Use Eliquis 5 mg twice a day starting from tomorrow  Use cream to affected areas for your itching and rash  Avoid alcohol as for now  Please send me a message in 4 to 5 days when you will be running out on your lorazepam   We agreed with you that it is okay to take it sparingly 2 pills daily at night until you will be restarted on your Zolof    For now continue to hold your Zoloft, discuss it again with your electrophysiology team

## 2023-03-30 DIAGNOSIS — I48.0 PAROXYSMAL A-FIB (HCC): Primary | ICD-10-CM

## 2023-03-30 RX ORDER — DABIGATRAN ETEXILATE 150 MG/1
150 CAPSULE ORAL 2 TIMES DAILY
Qty: 60 CAPSULE | Refills: 0 | Status: SHIPPED | OUTPATIENT
Start: 2023-03-30 | End: 2023-04-07 | Stop reason: SDUPTHER

## 2023-03-31 DIAGNOSIS — R21 RASH: ICD-10-CM

## 2023-03-31 RX ORDER — TRIAMCINOLONE ACETONIDE 1 MG/G
CREAM TOPICAL 2 TIMES DAILY
Qty: 453.6 G | Refills: 0 | Status: SHIPPED | OUTPATIENT
Start: 2023-03-31

## 2023-04-03 ENCOUNTER — TELEPHONE (OUTPATIENT)
Dept: CARDIOLOGY CLINIC | Facility: CLINIC | Age: 73
End: 2023-04-03

## 2023-04-03 NOTE — TELEPHONE ENCOUNTER
"Pt calling stating \"needs to go back on his Zoloft\" was told by Dr Mario Sauceda (who is unavailable this week that he needs to discuss at his upcoming EP appt   the patient states he cant wait   he has mood swings and needs something  Please advise  Your imput would greatly be appreciated  Pt has appt with Yvrose Orozco 4/12 and last saw Mario Sauceda 3/15     "

## 2023-04-04 ENCOUNTER — TELEPHONE (OUTPATIENT)
Dept: CARDIOLOGY CLINIC | Facility: CLINIC | Age: 73
End: 2023-04-04

## 2023-04-04 NOTE — TELEPHONE ENCOUNTER
"Called patient with message from Dr Nathan Wiley   Transferred to clerical to schedule nurse visit for EKG only  Edda Luevano EKG to be reviewed by Dr Nathan Wiley  Pt is demanding and does not understand why his Zoloft was stopped tried to explain and he states the \"internet does not say it affects his heart\"    spoke with Dr Nathan Wiley  he will put note in chart     "

## 2023-04-05 ENCOUNTER — CLINICAL SUPPORT (OUTPATIENT)
Dept: CARDIOLOGY CLINIC | Facility: CLINIC | Age: 73
End: 2023-04-05

## 2023-04-05 VITALS
DIASTOLIC BLOOD PRESSURE: 70 MMHG | HEIGHT: 68 IN | SYSTOLIC BLOOD PRESSURE: 130 MMHG | HEART RATE: 74 BPM | WEIGHT: 192 LBS | BODY MASS INDEX: 29.1 KG/M2

## 2023-04-05 DIAGNOSIS — I48.91 ATRIAL FIBRILLATION, UNSPECIFIED TYPE (HCC): Primary | ICD-10-CM

## 2023-04-05 DIAGNOSIS — R94.31 PROLONGED Q-T INTERVAL ON ECG: ICD-10-CM

## 2023-04-05 DIAGNOSIS — F41.8 SITUATIONAL ANXIETY: ICD-10-CM

## 2023-04-05 NOTE — PROGRESS NOTES
Patient came in today for baseline EKG per Dr Chris Eric  Darion Willoughby has a BiV ICD in place  He wants to go back on Zoloft, which was one of his medications before cardiac arrest on 3/6/23  BP today 130/70 and heart rate 74  Dr Bonnie Kramer to read EKG in office  I will then forward to Dr Chris Eric for review  Results for orders placed or performed in visit on 04/05/23   POCT ECG    Narrative    There appears to be a supraventricular beat with a narrow QRS which may be atrial sensed or atrial paced resulting in a pseudofusion followed by a completely ventricular paced beat with a wide QRS which may also be atrial sensed  There appears to be significant T wave inversions in leads I, 2, 3, aVF and V2 through V6  The T wave inversion is deep and symmetrical but of uncertain significance because the beats are paced beats or pseudofusions  Abnormal EKG

## 2023-04-05 NOTE — TELEPHONE ENCOUNTER
I called Mr Chandana Landeros this afternoon and we spoke at length about the EKG and the Zoloft that he wants to resume  He was more understanding after the discussion about the QT interval on the EKG and the Zoloft  We reviewed his medications, and he agreed to stay off the Zoloft until his appointment with Dr Zuri Lorenzo  He is scheduled to see his psychologist soon, and they will discuss a new antidepressant that will not have an effect on the QT interval   He has agreed to call the office after he has his appointment, to advise Dr Zuri Lorenzo what was prescribed  His medications were reviewed, and I encouraged him to call the office at least 5-7 days prior to needing a refill  [facility] office phone number also provided to the patient  Patient verbalizes understanding

## 2023-04-06 RX ORDER — LORAZEPAM 0.5 MG/1
0.5 TABLET ORAL EVERY 8 HOURS PRN
Qty: 60 TABLET | Refills: 1 | Status: SHIPPED | OUTPATIENT
Start: 2023-04-06

## 2023-04-07 ENCOUNTER — TELEPHONE (OUTPATIENT)
Dept: CARDIOLOGY CLINIC | Facility: CLINIC | Age: 73
End: 2023-04-07

## 2023-04-07 ENCOUNTER — OFFICE VISIT (OUTPATIENT)
Dept: UROLOGY | Facility: CLINIC | Age: 73
End: 2023-04-07

## 2023-04-07 ENCOUNTER — TELEPHONE (OUTPATIENT)
Dept: FAMILY MEDICINE CLINIC | Facility: CLINIC | Age: 73
End: 2023-04-07

## 2023-04-07 VITALS
OXYGEN SATURATION: 98 % | BODY MASS INDEX: 29.25 KG/M2 | HEIGHT: 68 IN | SYSTOLIC BLOOD PRESSURE: 126 MMHG | HEART RATE: 96 BPM | WEIGHT: 193 LBS | DIASTOLIC BLOOD PRESSURE: 78 MMHG

## 2023-04-07 DIAGNOSIS — N32.3 DIVERTICULUM OF BLADDER: ICD-10-CM

## 2023-04-07 DIAGNOSIS — I48.0 PAROXYSMAL A-FIB (HCC): ICD-10-CM

## 2023-04-07 DIAGNOSIS — I50.9 NEW ONSET OF CONGESTIVE HEART FAILURE (HCC): ICD-10-CM

## 2023-04-07 DIAGNOSIS — N40.0 BENIGN PROSTATIC HYPERPLASIA, UNSPECIFIED WHETHER LOWER URINARY TRACT SYMPTOMS PRESENT: Primary | ICD-10-CM

## 2023-04-07 DIAGNOSIS — Z12.5 PROSTATE CANCER SCREENING: ICD-10-CM

## 2023-04-07 RX ORDER — SPIRONOLACTONE 25 MG/1
25 TABLET ORAL DAILY
Qty: 90 TABLET | Refills: 1 | Status: SHIPPED | OUTPATIENT
Start: 2023-04-07

## 2023-04-07 RX ORDER — DABIGATRAN ETEXILATE 150 MG/1
150 CAPSULE ORAL 2 TIMES DAILY
Qty: 180 CAPSULE | Refills: 1 | Status: SHIPPED | OUTPATIENT
Start: 2023-04-07

## 2023-04-07 RX ORDER — LOSARTAN POTASSIUM 50 MG/1
50 TABLET ORAL DAILY
Qty: 90 TABLET | Refills: 1 | Status: SHIPPED | OUTPATIENT
Start: 2023-04-07

## 2023-04-07 NOTE — TELEPHONE ENCOUNTER
"Spoke with patient  I did send renewals on losartan, spironolactone and Pradaxa for patient to DR Perez Ok  He is asking if ok to use Viagra  He would like an answer as he does not want to discuss with his wife present at [address] [facility] with you on Wednesday 4/13/23  He also wants to discuss his need for an antidepressant as you had d/c'd his Zoloft previously because of prolonged QT  He feels he needs to be on something as his Kurt Minion is through the roof  \"  "

## 2023-04-07 NOTE — PROGRESS NOTES
4/7/2023    Beverly Perezjordyn  1950  [id number]      Assessment  History of BPH, status post TURP x2, status post robot-assisted laparoscopic bladder diverticulectomy, persistent neurogenic bladder      Discussion  Alexander Arana continues to perform clean intermittent catheterization as he cannot urinate spontaneously  I recommend that he continue with catheterization  A repeat urinalysis and urine culture was sent from the office today  I recommend follow-up in the next 6 months with cystoscopy to reassess his bladder and bladder outlet  The patient is amenable with this plan  History of Present Illness  81 y o  male with a history of BPH with bladder outlet obstruction  He has undergone TURP x2  The first was performed at [facility] and then many years later he had a second performed in [place]  Most recently he had a robot-assisted laparoscopic bladder diverticulectomy performed  I had suggested that this be completed and the patient had his surgery performed at [facility]  Unfortunately he is unable to urinate  He continues with clean intermittent catheterization  He states that recently he had V-fib cardiac arrest   His wife called 911 and he was resuscitated  He now has a pacemaker in place  He states that over a month ago a spot culture while he was hospitalized showed E  coli, however, he was never treated and he states that he is completely asymptomatic  We discussed repeating a culture today  AUA Symptom Score      Review of Systems  Review of Systems   Constitutional: Negative  HENT: Negative  Eyes: Negative  Respiratory: Negative  Cardiovascular: Negative  Gastrointestinal: Negative  Endocrine: Negative  Genitourinary:        Per HPI   Musculoskeletal: Negative  Skin: Negative  Allergic/Immunologic: Negative  Neurological: Negative  Hematological: Negative  Psychiatric/Behavioral: Negative            Past Medical History  Past Medical History:   Diagnosis Date   • Anxiety    • BPH (benign prostatic hypertrophy)    • Cancer (HCC)     CLL   • CLL (chronic lymphocytic leukemia) ([facility] )     2009   • Colon polyp    • Concussion     Resolved: 08/22/16   • Depression    • Diverticulitis 1/13/2020 2014 CT done 11/19 12/19 CT done    • E coli bacteremia 6/27/2021    2/2 blood cultures with Della Hawkins appears to be the urine    • Epididymitis 5/19/2021 5/2021   • Gastrointestinal hemorrhage     Last assessed: 08/27/13   • GERD (gastroesophageal reflux disease)    • H/O vitamin D deficiency 7/7/2021   • Hearing loss of aging    • Hiatal hernia    • History of right hip replacement 4/19/2021   • History of transfusion    • Hyperlipidemia    • Hypertension    • Hyponatremia 3/6/2023   • Iron deficiency anemia    • Microscopic hematuria     Last assessed: 06/28/13   • OA (osteoarthritis)     right hip   • Obesity (BMI 30 0-34  9) 4/7/2022   • Pneumothorax    • Primary osteoarthritis of right hip 9/29/2017    He is status post right hip arthroplasty   • Prostatitis    • Pulmonary hypertension ([facility] )    • Seasonal allergies    • Sepsis ([facility] ) 3/6/2023   • Unresponsive episode 3/7/2023   • Urinary tract infection associated with catheterization of urinary tract ([facility] ) 2/5/2021    Pseudomonal UTI asymptomatic  Per Urology do not treat at this time  • Urinary tract infection associated with catheterization of urinary tract ([facility] ) 2/5/2021    Pseudomonal UTI asymptomatic  Per Urology do not treat at this time  He did have urosepsis from E coli 7/21   • UTI (urinary tract infection)     in past   • Wears glasses        Past Social History  Past Surgical History:   Procedure Laterality Date   • BLADDER SURGERY     • CARDIAC CATHETERIZATION Left 3/7/2023    Procedure: Cardiac Left Heart Cath;  Surgeon: Ginette Osorio MD;  Location: [facility] CARDIAC CATH [facility];   Service: Cardiology   • CARDIAC CATHETERIZATION N/A 3/7/2023    Procedure: Cardiac temporary pacemaker; "Surgeon: Keke Thakur MD;  Location: [facility]ARDI[facility]; Service: Cardiology   • CARDIAC ELECTROPHYSIOLOGY PROCEDURE N/A 3/8/2023    Procedure: Cardiac icd implant;  Surgeon: Christo Hickey MD;  Location:  CARDIAC CATH LAB;   Service: Cardiology   • COLONOSCOPY     • CYSTOSCOPY  10/09/2014    Diagnostic   • CYSTOSCOPY  2020   • FL CYSTOGRAM  08/15/2022   • FRACTURE SURGERY      left lower arm   • FRACTURE SURGERY      left femur   • HERNIA REPAIR     • JOINT REPLACEMENT Right     hip   • OTHER SURGICAL HISTORY  2011    Spinal anesthesia epidural   • OH ARTHRP ACETBLR/PROX FEM PROSTC AGRFT/ALGRFT Right 2017    Procedure: ARTHROPLASTY HIP TOTAL ANTERIOR;  Surgeon: Hieu Pena MD;  Location: [facility];  Service: Orthopedics   • TONSILLECTOMY AND ADENOIDECTOMY     • TRANSURETHRAL RESECTION OF PROSTATE      x 2   • WRIST SURGERY         Past Family History  Family History   Problem Relation Age of Onset   • No Known Problems Mother    • Heart attack Father    • Diabetes Brother    • Prostate cancer Brother        Past Social history  Social History     Socioeconomic History   • Marital status: /Civil Union     Spouse name: Not on file   • Number of children: Not on file   • Years of education: Not on file   • Highest education level: Not on file   Occupational History   • Not on file   Tobacco Use   • Smoking status: Former     Packs/day: 1 00     Years: 15 00     Pack years: 15 00     Types: Cigarettes     Start date:      Quit date: 1980     Years since quittin 6   • Smokeless tobacco: Never   Vaping Use   • Vaping Use: Never used   Substance and Sexual Activity   • Alcohol use: Not Currently     Alcohol/week: 10 0 standard drinks     Types: 10 Cans of beer per week     Comment: no more drinking   • Drug use: Not Currently     Types: Marijuana     Comment: \"medical marijuana\"   • Sexual activity: Not Currently   Other Topics Concern   • Not on file   Social History Narrative   • " Not on file     Social Determinants of Health     Financial Resource Strain: Low Risk    • Difficulty of Paying Living Expenses: Not hard at all   Food Insecurity: No Food Insecurity   • Worried About [address] in the Last Year: Never true   • Ran Out of Food in the Last Year: Never true   Transportation Needs: No Transportation Needs   • Lack of Transportation (Medical): No   • Lack of Transportation (Non-Medical): No   Physical Activity: Not on file   Stress: Not on file   Social Connections: Not on file   Intimate Partner Violence: Not on file   Housing Stability: Low Risk    • Unable to Pay for Housing in the Last Year: No   • Number of Places Lived in the Last Year: 1   • Unstable Housing in the Last Year: No       Current Medications  Current Outpatient Medications   Medication Sig Dispense Refill   • Acetaminophen 500 MG Take 1,000 mg by mouth prn     • ascorbic acid (VITAMIN C) 250 MG CHEW Chew 250 mg 2 (two) times a day     • atorvastatin (LIPITOR) 20 mg tablet TAKE ONE TABLET BY MOUTH ONCE EVERY DAY 90 tablet 3   • Blood Pressure Monitoring (Omron 5 Series BP Monitor) PEE      • cetirizine (ZyrTEC) 10 mg tablet Take 10 mg by mouth as needed       • dabigatran etexilate (PRADAXA) 150 mg capsu Take 1 capsule (150 mg total) by mouth 2 (two) times a day 60 capsule 0   • LORazepam (Ativan) 0 5 mg tablet Take 1 tablet (0 5 mg total) by mouth every 8 (eight) hours as needed for anxiety 60 tablet 1   • losartan (COZAAR) 50 mg tablet Take 1 tablet (50 mg total) by mouth daily 30 tablet 0   • montelukast (SINGULAIR) 10 mg tablet TAKE ONE TABLET BY MOUTH DAILY 90 tablet 3   • nadolol (CORGARD) 80 MG tablet Take 1 tablet (80 mg total) by mouth daily in the early morning Do not start before March 14, 2023  30 tablet 0   • Omega-3 Fatty Acids (FISH OIL) 1,000 mg Take by mouth daily     • omeprazole (PriLOSEC) 40 MG capsule Take one capsule by mouth once every day   90 capsule 3   • senna-docusate sodium "(SENOKOT S) 8 6-50 mg per tablet Take 2 tablets by mouth 2 (two) times a day (Patient taking differently: Take 2 tablets by mouth as needed)  0   • spironolactone (ALDACTONE) 25 mg tablet Take 1 tablet (25 mg total) by mouth daily 30 tablet 0   • triamcinolone (KENALOG) 0 1 % cream Apply topically 2 (two) times a day 453 6 g 0   • vitamin B-12 (CYANOCOBALAMIN) 500 MCG TABS Take 1 tablet (500 mcg total) by mouth daily 30 tablet 0   • lidocaine (LIDODERM) 5 % Apply 1 patch topically over 12 hours daily Remove & Discard patch within 12 hours or as directed by MD (Patient not taking: Reported on 4/7/2023) 30 patch 0     No current facility-administered medications for this visit  Allergies  Allergies   Allergen Reactions   • Codeine Other (See Comments)     agitated   • Sulfamethoxazole-Trimethoprim GI Intolerance and Abdominal Pain     upset stomach       Past Medical History, Social History, Family History, medications and allergies were reviewed  Vitals  Vitals:    04/07/23 0824   BP: 126/78   BP Location: Right arm   Patient Position: Sitting   Cuff Size: Adult   Pulse: 96   SpO2: 98%   Weight: 87 5 kg (193 lb)   Height: 5' 8\" (1 727 m)       Physical Exam  Physical Exam    On examination he is in no acute distress    Gait normal   Affect normal    Results  Lab Results   Component Value Date    PSA 0 4 07/29/2021    PSA 0 4 01/27/2021    PSA 0 2 01/30/2020     Lab Results   Component Value Date    GLUCOSE 130 01/10/2021    CALCIUM 7 4 (L) 03/13/2023     (L) 07/20/2015    K 3 9 03/13/2023    CO2 26 03/13/2023     (H) 03/13/2023    BUN 10 03/13/2023    CREATININE 0 66 03/13/2023     Lab Results   Component Value Date    WBC 26 62 (H) 03/28/2023    HGB 11 8 (L) 03/28/2023    HCT 36 3 (L) 03/28/2023     (H) 03/28/2023     03/28/2023         Office Urine Dip  No results found for this or any previous visit (from the past 1 hour(s)) ]        "

## 2023-04-07 NOTE — TELEPHONE ENCOUNTER
Hi, this is [facility]  Date of birth is [birth year]  I have an appointment with Doctor Alessandro Raygoza on Wednesday, coming Wednesday around 4:30 I think it is  I talked to [address] [place] [address] the other day, a nice conversation and I didn't realize I am going to be out of scripts that they prescribed me as of that day  I have enough for that day yet I have a call into the number Wilson Dodson gave me, [phone], but that person wasn't available  I left a message on that line  So please call me on my home phone at [phone]  Thank you

## 2023-04-09 LAB — BACTERIA UR CULT: ABNORMAL

## 2023-04-12 NOTE — TELEPHONE ENCOUNTER
Spoke with patient  He has an appointment today and will discuss all of his issues with you  His PCP started him on buspirone

## 2023-04-21 PROBLEM — I50.42 CHRONIC COMBINED SYSTOLIC AND DIASTOLIC CONGESTIVE HEART FAILURE (HCC): Status: ACTIVE | Noted: 2023-03-12

## 2023-04-21 PROBLEM — R94.31 PROLONGED QT INTERVAL: Status: RESOLVED | Noted: 2023-03-07 | Resolved: 2023-04-21

## 2023-04-21 PROBLEM — R40.20 LOSS OF CONSCIOUSNESS (HCC): Status: RESOLVED | Noted: 2023-03-10 | Resolved: 2023-04-21

## 2023-04-24 ENCOUNTER — PATIENT MESSAGE (OUTPATIENT)
Dept: FAMILY MEDICINE CLINIC | Facility: CLINIC | Age: 73
End: 2023-04-24

## 2023-04-25 ENCOUNTER — CLINICAL SUPPORT (OUTPATIENT)
Dept: CARDIAC REHAB | Age: 73
End: 2023-04-25

## 2023-04-25 DIAGNOSIS — I50.22 CHRONIC SYSTOLIC CONGESTIVE HEART FAILURE (HCC): ICD-10-CM

## 2023-04-25 DIAGNOSIS — I50.9 NEW ONSET OF CONGESTIVE HEART FAILURE (HCC): ICD-10-CM

## 2023-04-25 NOTE — PROGRESS NOTES
"Cardiac Rehabilitation Plan of Care   Initial Care Plan          Today's date: 2023   # of Exercise Sessions Completed: 1 initial eval   Patient name: Silvia Gonzales      : 1950  Age: 81 y o  MRN: [id number]  Referring Physician: Ab Morillo DO  Cardiologist: Dr Brooke Rubio  Provider: Alonzo Girard  Clinician: Army Van, MS, CEP    Dx:   Encounter Diagnoses   Name Primary? • Chronic systolic congestive heart failure ([facility] )    • New onset of congestive heart failure ([facility] )      Date of onset: 3/12/23      SUMMARY OF PROGRESS:  Today is Nitin Weinstein initial evaluation to begin Cardiac Rehab post CHF, EF 30%  Possible stress-induced CM in vfib arrest setting  Riamundo Schmidt experienced loss of consciousness while at home, watching a movie in bed  His wife contacted EMS, he was in Vfib and they administered CPR and shocked him 1x  During an echo at the hospital, he went into Vtach to then vfib/torsades  He was transferred to [facility] for dual chamber AICD placement on 3/8  Prior to his event, he said he \"was partying more than usual\"  He was drinking heavily and smoking MJ the night prior  Currently, he denies the use of marijuana and states he is drinking \"clear beer\" or non-alcoholic beers  The patient does not currently follow a formal exercise program at home  He has resumed all ADLs without limitations  Depression screening using the PHQ-9 interprets the patient's score of  8  as  5-9 = Mild Depression  ANNALSIE-7 screening tool for anxiety suggests 13  10-14 = Moderate anxiety  When addressed, the patient admits to having anxiety  He was recently taken off his Zoloft and currently is trying to find a replacement for his anti-anxiety medication  Recently started buspirone which he states seems to be helping  He states problems sleeping  Encouraged avoiding blue light at night, try meditation  He has been using Melotonin with success    Patient reports excellent social/emotional support from his psychologist, " cousin and a close friend  Information to utilize [employer] was provided as well as contact information for counseling through [employer]  PHQ-9 score will be reassessed in 30 days due to an initial score revealing concern for depression  They rate stress 6/10 with the following stressors: wife, worried about another cardiac arrest, misses drinking/smoking with his friends at the bar  Stress management will be reviewed  The patient is a former smoker (quit 43 years ago), marijuana user and has been abstaining from [address] currently  He was counseled on effects of smoking  Patient admits to 100% medication compliance  They report the following physical limitations: none  The patient completed an initial 6MWT  The patient walked  [phone] [id number] 66 feet/2  3METs  Resting  /60 with Normal response to exercise reaching 138/80  Blood Pressure will be monitored throughout the program and cardiologist will be notified of elevated trends  Patient reported symptoms with exercise including LBP - 1 standing rest break taken    Telemetry revealed Paced  Crason Aguilera was counseled on exercise guidelines to achieve a minimum of 150 mins/wk of moderate intensity (RPE 4-6) exercise and encouraged to add 1-2 days of exercise on opposite days of cardiac rehab as tolerated  We discussed current dietary habits and goals of heart healthy eating for lipid management and weight loss  Patient's personal goals include: Lose weight, regular exercise routine  The patient's CAD risk factors include:  inactivity, hypertension, hyperlipidemia and smoking  His education will focus on lifestyle modification/education specific to His needs  Patient will attend group education classes on heart healthy eating, reading food labels, stress management, risk factor reduction, understanding heart disease and common heart medications  Patient will attend 35 monitored exercise sessions, 3x/wk for 12-18 weeks beginning 5/1/23         Medication compliance: Yes   Comments: Pt reports to be compliant with medications  Fall Risk: Low   Comments: Ambulates with a steady gait with no assist device and Denies a fall in the past 6 months    EKG Interpretation: Paced      EXERCISE ASSESSMENT and PLAN    Exercise Prescription:      Frequency: 3 days/week   Supplement with home exercise 2+ days/wk as tolerated       Minutes: 30         METS: 2 0-3 0            HR: RHR+30   RPE: 4-6         Modalities: Treadmill, UBE, NuStep and Recumbent bike      30 Day Goals for Exercise Progression:    Frequency: 3 days/week of cardiac rehab       Supplement with home exercise 2+ days/wk as tolerated    Minutes: 35-40                              >150 mins/wk of moderate intensity exercise   METS: 2 5-4   HR: RHR=+30bpm    RPE: 4-6   Modalities: Treadmill, UBE, Lifecycle, NuStep and Recumbent bike    Strength training: Will be added following at least 8 weeks post surgery and 8-10 monitored sessions   Modalities: Leg Press, Chest Press, Pull Downs and Lateral Raise    Home Exercise: none    Goals: 10% improvement in functional capacity - based on max METs achieved in fitness assessment, improved DASI score by 10%, Resume ADLs with increased strength, Attend Rehab regularly and start a home exercise program    Progression Toward Goals:  Reviewed Pt goals and determined plan of care, Will continue to educate and progress as tolerated      Education: home exercise guidelines, AHA guidelines to achieve >150 mins/wk of moderate exercise, RPE scale and Long QT   Plan:education on home exercise guidelines and home exercise 30+ mins 2 days opposite CR  Readiness to change: Preparation:  (Getting ready to change)       NUTRITION ASSESSMENT AND PLAN    Weight control:    Starting weight: 191   Current weight:       Lipid management: Last lipid profile 4/18/23  Chol 118    HDL 31  LDL 63    Goals:Improved Rate Your Plate score  >93, choose lean meat (93-95%), reduce portion sizes of meat to 3oz or less, Eat 4-5 cups of fruits and vegetables daily, eliminate or choose low-fat sweets and weight loss    Measurable goals were based Rate Your Plate Dietary Self-Assessment  These are the areas in which the patient could score higher on the assessment  Goals include recommendations for a heart healthy diet based on American Heart Association  Progression Toward Goals: Reviewed Pt goals and determined plan of care, Will continue to educate and progress as tolerated  Education: heart healthy eating  low sodium diet  hydration  nutrition for  lipid management  wt  loss   portion control  Plan: Education class: Reading Food Labels, Education Class: Heart Healthy Eating, reduce portion sizes, eat fewer desserts and sweets, avoid processed foods, will replace sugar sweetened cereals with whole grain or oatmeal and abstain from alcohol  Readiness to change: Preparation:  (Getting ready to change)       PSYCHOSOCIAL ASSESSMENT AND PLAN    Emotional:  Depression assessment:  PHQ-9 = 8  5-9 = Mild Depression            Anxiety measure:  ANNALISE-7 = 13  10-14 = Moderate anxiety  Self-reported stress level:  6  Social support: Patient reports excellent emotional/social support from psychologist, cousin and Patient has friends available for support when needed     Goals:  Reduce perceived stress to 1-3/10, PHQ-9 - reduced severity by one level, follow up with therapist, Feelings in [place] Score < 3, Physical Fitness in [place] Score < 3, Daily Activity in [place] Score < 3, Social Activities in [place] Score < 3, Overall Health in [place] Score < 3, Quality of Life in [place] Score < 3 , improved sleep, improved positive thoughts of well being, increased energy, stop worrying, Feel less anxious and Handle anger/frustration better    Progression Toward Goals: Reviewed Pt goals and determined plan of care, Will continue to educate and progress as tolerated      Education: benefits of a positive support system and stress management techniques  Plan: Class: Stress and Your Health, Class: Relaxation, PHQ-9 >5 will refer to MD, Refer to Gualberto Lassiter, Exercise, Join a support group  and maintain visits with therapist, practice meditation and hang out with friends  Readiness to change: Preparation:  (Getting ready to change)       OTHER CORE COMPONENTS     Tobacco:   Social History     Tobacco Use   Smoking Status Former   • Packs/day: 1 00   • Years: 15    • Pack years: 15    • Types: Cigarettes   • Start date: 67   • Quit date: 1980   • Years since quittin 6   Smokeless Tobacco Never       Tobacco Use Intervention:   Pt quit many years ago   and has abstained    Anginal Symptoms:  None   NTG use: No prescription    Blood pressure:    Restin/60   Exercise: 138/80    Goals: moderate intensity exercise >150 mins/wk, medication compliance, Abstain from smoking and abstain from marijuana    Progression Toward Goals: Reviewed Pt goals and determined plan of care, Will continue to educate and progress as tolerated      Education:  low sodium diet and HTN, smoking and heart disease, relapse education, smoking cessation and effecs of alcohol/MJ   Plan: Class: Understanding Heart Disease, Class: Common Heart Medications, Complete abstention from smoking and engage in regular exercise  Readiness to change: Preparation:  (Getting ready to change)

## 2023-04-25 NOTE — PROGRESS NOTES
"CARDIAC REHAB ASSESSMENT    Today's date: 2023  Patient name: Jennifer Quiroz     : 1950       MRN: [id number]  PCP: Yessenia You MD  Referring Physician: Governor Samantha DO  Cardiologist: Dr Rina Pollock   Surgeon: Dr Nu Andino (cath) Dr Dee Kyle (ICD)  Dx:   Encounter Diagnoses   Name Primary? • New onset of congestive heart failure ([facility] )    • Chronic systolic congestive heart failure ([facility] )        Date of onset: 3/12/23  Cultural needs:     Weight    Wt Readings from Last 1 Encounters:   23 87 2 kg (192 lb 3 2 oz)      Height:   Ht Readings from Last 1 Encounters:   23 5' 8\" (1 727 m)     Medical History:   Past Medical History:   Diagnosis Date   • Anxiety    • BPH (benign prostatic hypertrophy)    • Cancer (HCC)     CLL   • CLL (chronic lymphocytic leukemia) ([facility] )        • Colon polyp    • Concussion     Resolved: 16   • Depression    • Diverticulitis 2020 CT done  CT done    • E coli bacteremia 2021    2/ blood cultures with Hermina Phalen appears to be the urine    • Epididymitis 2021   • Gastrointestinal hemorrhage     Last assessed: 13   • GERD (gastroesophageal reflux disease)    • H/O vitamin D deficiency 2021   • Hearing loss of aging    • Hiatal hernia    • History of right hip replacement 2021   • History of transfusion    • Hyperlipidemia    • Hypertension    • Hyponatremia 3/6/2023   • Iron deficiency anemia    • Microscopic hematuria     Last assessed: 13   • OA (osteoarthritis)     right hip   • Obesity (BMI 30 0-34  9) 2022   • Pneumothorax    • Primary osteoarthritis of right hip 2017    He is status post right hip arthroplasty   • Prostatitis    • Pulmonary hypertension ([facility] )    • Seasonal allergies    • Sepsis ([facility] ) 3/6/2023   • Unresponsive episode 3/7/2023   • Urinary tract infection associated with catheterization of urinary tract ([facility] ) 2021    Pseudomonal UTI asymptomatic    Per " Urology do not treat at this time  • Urinary tract infection associated with catheterization of urinary tract ([facility] ) 2/5/2021    Pseudomonal UTI asymptomatic  Per Urology do not treat at this time    He did have urosepsis from E coli 7/21   • UTI (urinary tract infection)     in past   • Wears glasses          Physical Limitations: nothing     Fall Risk: Low   Comments: Ambulates with a steady gait with no assist device and Denies a fall in the past 6 months    Anginal Equivalent: None/denies angina   NTG use: No prescription    Risk Factors   Cholesterol: Yes  Smoking: Former user - quit early [de-identified]  HTN: Yes  DM: No  Obesity: No   Inactivity: No  Stress:  perceived  stress: 6/10   Stressors: wife, cardiac arrest worries, misses smoking/drinking with buddies,    Goals for Stress Management:spend time with friends, [employer] med, enjoy rides in the car    Family History:  Family History   Problem Relation Age of Onset   • No Known Problems Mother    • Heart attack Father    • Diabetes Brother    • Prostate cancer Brother        Allergies: Codeine and Sulfamethoxazole-trimethoprim  ETOH:   Social History     Substance and Sexual Activity   Alcohol Use Not Currently   • Alcohol/week: 10 0 standard drinks   • Types: 10 Cans of beer per week    Comment: no more drinking         Current Medications:   Current Outpatient Medications   Medication Sig Dispense Refill   • Acetaminophen 500 MG Take 1,000 mg by mouth prn     • ascorbic acid (VITAMIN C) 250 MG CHEW Chew 250 mg 2 (two) times a day     • atorvastatin (LIPITOR) 20 mg tablet TAKE ONE TABLET BY MOUTH ONCE EVERY DAY 90 tablet 3   • Blood Pressure Monitoring (Omron 5 Series BP Monitor) PEE      • busPIRone (BUSPAR) 7 5 mg tablet 1 tablet in the morning and 2 tablets at night 270 tablet 1   • dabigatran etexilate (PRADAXA) 150 mg capsu Take 1 capsule (150 mg total) by mouth 2 (two) times a day 180 capsule 1   • LORazepam (Ativan) 0 5 mg tablet Take 1 tablet (0 5 mg total) by mouth every 8 (eight) hours as needed for anxiety 60 tablet 1   • losartan (COZAAR) 50 mg tablet Take 1 tablet (50 mg total) by mouth daily 90 tablet 1   • montelukast (SINGULAIR) 10 mg tablet TAKE ONE TABLET BY MOUTH DAILY 90 tablet 3   • nadolol (CORGARD) 80 MG tablet Take 1 tablet (80 mg total) by mouth daily in the early morning 90 tablet 3   • Omega-3 Fatty Acids (FISH OIL) 1,000 mg Take by mouth daily     • omeprazole (PriLOSEC) 40 MG capsule Take one capsule by mouth once every day  90 capsule 3   • senna-docusate sodium (SENOKOT S) 8 6-50 mg per tablet Take 2 tablets by mouth 2 (two) times a day (Patient taking differently: Take 2 tablets by mouth as needed)  0   • spironolactone (ALDACTONE) 25 mg tablet Take 1 tablet (25 mg total) by mouth daily 90 tablet 1   • vitamin B-12 (CYANOCOBALAMIN) 500 MCG TABS Take 1 tablet (500 mcg total) by mouth daily 30 tablet 0     No current facility-administered medications for this visit  Current Functional Status  Occupation: retired - [employer] 36 years  Recreation:  Go watch sports with friends down at the bar - going to concert  ADL’s:No limitations  [place]: No limitations  Exercise: none  Other:     Patient Specific Goals:  Lose weight, regular exercise routine,     Short Term Program Goals: dietary modifications increased strength improved energy/stamina with ADLs exercise 120-150 mins/wk    Long Term Goals: Improved functional capacity    Ability to reach goals/rehabilitation potential:  Excellent    Projected return to function: 12 weeks  Objective tests: 6 MWT      Nutritional   Reviewed details of Rate your Plate  Discussed key elements of heart healthy eating  Reviewed patient goals for dietary modifications and their clinical implications  Reviewed most recent lipid profile       Goals for dietary modification based on Rate Your Plate Dietary Assessment:  low fat ground meat and poultry  reduce portions of meat to 3 oz  increase fruits and vegetables  low sodium  reduce sweets/frozen desserts  Abstain from alcohol       Emotional/Social  Patient reports he/she is coping well with good social support and denies depression or anxiety  Psychologist talks to, his cousin a bit, a good friend  Patient has a history of anxiety   Patient reports feelings of anxiety - stress  Marital status:     Domestic Violence Screening: No    Comments: ICD placement/pacer, denies alcohol use, denies MJ use

## 2023-05-01 ENCOUNTER — CLINICAL SUPPORT (OUTPATIENT)
Dept: CARDIAC REHAB | Age: 73
End: 2023-05-01

## 2023-05-01 ENCOUNTER — OFFICE VISIT (OUTPATIENT)
Dept: CARDIOLOGY CLINIC | Facility: CLINIC | Age: 73
End: 2023-05-01

## 2023-05-01 VITALS
SYSTOLIC BLOOD PRESSURE: 120 MMHG | BODY MASS INDEX: 29.5 KG/M2 | HEART RATE: 80 BPM | WEIGHT: 194 LBS | DIASTOLIC BLOOD PRESSURE: 70 MMHG

## 2023-05-01 DIAGNOSIS — I42.8 NICM (NONISCHEMIC CARDIOMYOPATHY) (HCC): ICD-10-CM

## 2023-05-01 DIAGNOSIS — I50.22 CHRONIC SYSTOLIC (CONGESTIVE) HEART FAILURE (HCC): ICD-10-CM

## 2023-05-01 DIAGNOSIS — I48.0 PAROXYSMAL A-FIB (HCC): Primary | ICD-10-CM

## 2023-05-01 NOTE — PROGRESS NOTES
Cardiology             Jocy Mon  1950  [id number]              Assessment/Plan     Long QT syndrome 2, now on nadolol, status post dual-chamber/biventricular AICD placed 3/8/2023  Paroxysmal atrial fibrillation/flutter  Paroxysmal SVT, status post adenosine 3/6/2023  Torsade de pointes and V-fib episode 3/6/2023  Nonischemic cardiomyopathy, ejection fraction 30%  Normal coronary angiography 3/7/2023  Mild ascending aortic aneurysm  CLL  History of heavy alcohol use  Dyslipidemia           AV paced rhythm on ECG, continue nadolol  Continue Pradaxa anticoagulation for paroxysmal atrial fibrillation and flutter  Continue Adderall for the same  Blood pressure controlled, continue nadolol, spironolactone, losartan  Continue cardiomyopathy regimen of losartan, spironolactone, nadolol  We will plan to repeat echocardiogram before next visit in 4 months  Advised cutting down on alcohol use is much as able  He states he would still like to continue drinking several times a week, and the risks of continued alcohol use was reviewed with him  Continue atorvastatin for dyslipidemia, will defer routine lipid panel to PCP             Follow-up in 1 month after BMP, magnesium, NT proBNP            Total visit time estimated 45 minutes             Interval History:      Jose Willams is a 81 y  o  male with hypertension who has to be on minoxidil in the past  [facility] also has a history of prolonged QT interval, and has been seeing Dr Abran Torres in the past  [facility] has also been following Dr Tobias Strange a pulmonary medicine for pulmonary hypertension and sleep apnea      He was hospitalized with severe COVID-19 pneumonia 01/2021 at which time our group was consulted due to bradycardia and short runs of nonsustained ventricular tachycardia   He was maintained on metoprolol and QT prolonging agents were avoided        He presents to the hospital 3/6/2023 after an unresponsive episode at home    EMS apparently noticed ventricular fibrillation and gave him 1 shock and started CPR  He was not intubated and presented to the ER lethargic and confused  He was in a narrow complex regular tachycardia, likely SVT  He was given adenosine with resolution of his arrhythmia, back into sinus rhythm  QT interval was initially within normal limits, but subsequently started becoming progressively more prolonged  In the morning of 3/7/2023 he had torsades with resultant ventricular fibrillation  He spontaneously converted back to sinus rhythm and was then started on a lidocaine infusion  He underwent coronary angiography 3/7/2023 revealing no obstructive CAD  A temporary pacing wire was placed for recurrent torsades  He was transferred to [facility] and was thought to have LQT2, with possible torsades precipitated at home due to SSRI and antibiotic use  He was initiated on nadolol  He was noted to have paroxysmal atrial fibrillation/flutter and was placed on Xarelto anticoagulation  Echocardiogram 3/9/2023 revealed left ventricle ejection fraction 30%  This was thought to be a nonischemic cardiomyopathy possibly due to stress  On 3/8/2023, he underwent placement of a [employer] dual-chamber AICD with an RA lead, RV lead, and LV/his lead  He was evaluated by the heart failure service and initiated on losartan and spironolactone  He was maintained on nadolol with plans to repeat echocardiogram in 3 months  He presents today for follow-up  From a symptomatic standpoint he states he feels very well without exertional chest pain, shortness of breath, dizziness, palpitations, lower extremity edema  He presents with multiple questions about his and his cardiac history  He states he continues to have insomnia and that increased dosing of BuSpar has not been working            Vitals:  Vitals:    05/01/23 1119   BP: 120/70   BP Location: Right arm   Patient Position: Sitting   Cuff Size: Adult   Pulse: 80   Weight: 88 kg (194 lb) Past Medical History:   Diagnosis Date    Anxiety     BPH (benign prostatic hypertrophy)     Cancer (HCC)     CLL    CLL (chronic lymphocytic leukemia) ([facility] )     2009    Colon polyp     Concussion     Resolved: 08/22/16    Depression     Diverticulitis 1/13/2020 2014 CT done 11/19 12/19 CT done     E coli bacteremia 6/27/2021    2/2 blood cultures with [id number] [facility] [address] appears to be the urine     Epididymitis 5/19/2021 5/2021    Gastrointestinal hemorrhage     Last assessed: 08/27/13    GERD (gastroesophageal reflux disease)     H/O vitamin D deficiency 7/7/2021    Hearing loss of aging     Hiatal hernia     History of right hip replacement 4/19/2021    History of transfusion     Hyperlipidemia     Hypertension     Hyponatremia 3/6/2023    Iron deficiency anemia     Microscopic hematuria     Last assessed: 06/28/13    OA (osteoarthritis)     right hip    Obesity (BMI 30 0-34  9) 4/7/2022    Pneumothorax     Primary osteoarthritis of right hip 9/29/2017    He is status post right hip arthroplasty    Prostatitis     Pulmonary hypertension (HCC)     Seasonal allergies     Sepsis ([facility] ) 3/6/2023    Unresponsive episode 3/7/2023    Urinary tract infection associated with catheterization of urinary tract ([facility] ) 2/5/2021    Pseudomonal UTI asymptomatic  Per Urology do not treat at this time   Urinary tract infection associated with catheterization of urinary tract ([place] [address] ) 2/5/2021    Pseudomonal UTI asymptomatic  Per Urology do not treat at this time    He did have urosepsis from E coli 7/21    UTI (urinary tract infection)     in past    Wears glasses      Social History     Socioeconomic History    Marital status: /Civil Union     Spouse name: Not on file    Number of children: Not on file    Years of education: Not on file    Highest education level: Not on file   Occupational History    Not on file   Tobacco Use    Smoking status: Former     Packs/day: 1 00 "Years: 15 00     Pack years: 15 00     Types: Cigarettes     Start date: 67     Quit date: 1980     Years since quittin 6    Smokeless tobacco: Never   Vaping Use    Vaping Use: Never used   Substance and Sexual Activity    Alcohol use: Not Currently     Alcohol/week: 10 0 standard drinks     Types: 10 Cans of beer per week     Comment: no more drinking    Drug use: Not Currently     Types: Marijuana     Comment: \"medical marijuana\"    Sexual activity: Not Currently   Other Topics Concern    Not on file   Social History Narrative    Not on file     Social Determinants of Health     Financial Resource Strain: Low Risk     Difficulty of Paying Living Expenses: Not hard at all   Food Insecurity: No Food Insecurity    Worried About Running Out of Food in the Last Year: Never true    [place]ovani of Food in the Last Year: Never true   Transportation Needs: No Transportation Needs    Lack of Transportation (Medical): No    Lack of Transportation (Non-Medical): No   Physical Activity: Not on file   Stress: Not on file   Social Connections: Not on file   Intimate Partner Violence: Not on file   Housing Stability: Low Risk     Unable to Pay for Housing in the Last Year: No    Number of Places Lived in the Last Year: 1    Unstable Housing in the Last Year: No      Family History   Problem Relation Age of Onset    No Known Problems Mother     Heart attack Father     Diabetes Brother     Prostate cancer Brother      Past Surgical History:   Procedure Laterality Date    BLADDER SURGERY      CARDIAC CATHETERIZATION Left 3/7/2023    Procedure: Cardiac Left Heart Cath;  Surgeon: Karen Larson MD;  Location: [facility]; Service: Cardiology    CARDIAC CATHETERIZATION N/A 3/7/2023    Procedure: Cardiac temporary pacemaker;  Surgeon: Karen Larson MD;  Location: [facility];   Service: Cardiology    CARDIAC ELECTROPHYSIOLOGY PROCEDURE N/A 3/8/2023    Procedure: Cardiac icd implant;  Surgeon: " Lawrence Lion MD;  Location: [facility];   Service: Cardiology    COLONOSCOPY      CYSTOSCOPY  10/09/2014    Diagnostic    CYSTOSCOPY  06/29/2020    FL CYSTOGRAM  08/15/2022    FRACTURE SURGERY      left lower arm    FRACTURE SURGERY      left femur    HERNIA REPAIR      JOINT REPLACEMENT Right     hip    OTHER SURGICAL HISTORY  03/2011    Spinal anesthesia epidural    VT ARTHRP ACETBLR/PROX FEM PROSTC AGRFT/ALGRFT Right 09/29/2017    Procedure: ARTHROPLASTY HIP TOTAL ANTERIOR;  Surgeon: Lidia Hewitt MD;  Location: [facility];  Service: Orthopedics    TONSILLECTOMY AND ADENOIDECTOMY      TRANSURETHRAL RESECTION OF PROSTATE      x 2    WRIST SURGERY         Current Outpatient Medications:     Acetaminophen 500 MG, Take 1,000 mg by mouth prn, Disp: , Rfl:     ascorbic acid (VITAMIN C) 250 MG CHEW, Chew 250 mg 2 (two) times a day, Disp: , Rfl:     atorvastatin (LIPITOR) 20 mg tablet, TAKE ONE TABLET BY MOUTH ONCE EVERY DAY, Disp: 90 tablet, Rfl: 3    Blood Pressure Monitoring (Omron 5 Series BP Monitor) PEE, , Disp: , Rfl:     busPIRone (BUSPAR) 7 5 mg tablet, 1 tablet in the morning and 2 tablets at night, Disp: 270 tablet, Rfl: 1    dabigatran etexilate (PRADAXA) 150 mg capsu, Take 1 capsule (150 mg total) by mouth 2 (two) times a day, Disp: 180 capsule, Rfl: 1    Lactobacillus (PROBIOTIC ACIDOPHILUS PO), Take by mouth, Disp: , Rfl:     LORazepam (Ativan) 0 5 mg tablet, Take 1 tablet (0 5 mg total) by mouth every 8 (eight) hours as needed for anxiety, Disp: 60 tablet, Rfl: 1    losartan (COZAAR) 50 mg tablet, Take 1 tablet (50 mg total) by mouth daily, Disp: 90 tablet, Rfl: 1    montelukast (SINGULAIR) 10 mg tablet, TAKE ONE TABLET BY MOUTH DAILY, Disp: 90 tablet, Rfl: 3    nadolol (CORGARD) 80 MG tablet, Take 1 tablet (80 mg total) by mouth daily in the early morning, Disp: 90 tablet, Rfl: 3    Omega-3 Fatty Acids (FISH OIL) 1,000 mg, Take by mouth daily, Disp: , Rfl:    omeprazole (PriLOSEC) 40 MG capsule, Take one capsule by mouth once every day , Disp: 90 capsule, Rfl: 3    senna-docusate sodium (SENOKOT S) 8 6-50 mg per tablet, Take 2 tablets by mouth 2 (two) times a day (Patient taking differently: Take 2 tablets by mouth as needed), Disp: , Rfl: 0    spironolactone (ALDACTONE) 25 mg tablet, Take 1 tablet (25 mg total) by mouth daily, Disp: 90 tablet, Rfl: 1    vitamin B-12 (CYANOCOBALAMIN) 500 MCG TABS, Take 1 tablet (500 mcg total) by mouth daily, Disp: 30 tablet, Rfl: 0        Review of Systems:  Review of Systems   Respiratory: Negative  Cardiovascular: Negative  All other systems reviewed and are negative  Physical Exam:  Physical Exam  Constitutional:       General: He is not in acute distress  Appearance: He is well-developed  He is not diaphoretic  HENT:      Head: Normocephalic and atraumatic  Eyes:      General: No scleral icterus  Right eye: No discharge  Pupils: Pupils are equal, round, and reactive to light  Neck:      Thyroid: No thyromegaly  Cardiovascular:      Rate and Rhythm: Normal rate and regular rhythm  Heart sounds: Normal heart sounds  No murmur heard  No friction rub  No gallop  Pulmonary:      Effort: Pulmonary effort is normal       Breath sounds: Normal breath sounds  Abdominal:      General: There is no distension  Tenderness: There is no abdominal tenderness  There is no guarding or rebound  Musculoskeletal:         General: Normal range of motion  Cervical back: Normal range of motion and neck supple  Skin:     General: Skin is warm and dry  Coloration: Skin is not pale  Findings: No erythema or rash  Neurological:      Mental Status: He is alert and oriented to person, place, and time  Coordination: Coordination normal    Psychiatric:         Behavior: Behavior normal          Thought Content:  Thought content normal          Judgment: Judgment normal          This note was completed in part utilizing M-Modal Fluency Direct Software  Grammatical errors, random word insertions, spelling mistakes, and incomplete sentences can be an occasional consequence of this system secondary to software limitations, ambient noise, and hardware issues  If you have any questions or concerns about the content, text, or information contained within the body of this dictation, please contact the provider for clarification

## 2023-05-02 ENCOUNTER — CLINICAL SUPPORT (OUTPATIENT)
Dept: CARDIAC REHAB | Age: 73
End: 2023-05-02

## 2023-05-02 DIAGNOSIS — I50.22 CHRONIC SYSTOLIC (CONGESTIVE) HEART FAILURE (HCC): Primary | ICD-10-CM

## 2023-05-03 ENCOUNTER — CLINICAL SUPPORT (OUTPATIENT)
Dept: CARDIAC REHAB | Age: 73
End: 2023-05-03

## 2023-05-03 DIAGNOSIS — I50.22 CHRONIC SYSTOLIC (CONGESTIVE) HEART FAILURE (HCC): ICD-10-CM

## 2023-05-04 ENCOUNTER — TELEPHONE (OUTPATIENT)
Dept: FAMILY MEDICINE CLINIC | Facility: CLINIC | Age: 73
End: 2023-05-04

## 2023-05-04 ENCOUNTER — OFFICE VISIT (OUTPATIENT)
Dept: HEMATOLOGY ONCOLOGY | Facility: CLINIC | Age: 73
End: 2023-05-04

## 2023-05-04 VITALS
HEIGHT: 68 IN | RESPIRATION RATE: 18 BRPM | OXYGEN SATURATION: 99 % | WEIGHT: 194 LBS | DIASTOLIC BLOOD PRESSURE: 78 MMHG | BODY MASS INDEX: 29.4 KG/M2 | SYSTOLIC BLOOD PRESSURE: 116 MMHG | HEART RATE: 90 BPM | TEMPERATURE: 98.4 F

## 2023-05-04 DIAGNOSIS — C91.10 CLL (CHRONIC LYMPHOCYTIC LEUKEMIA) (HCC): Primary | Chronic | ICD-10-CM

## 2023-05-04 RX ORDER — AMOXICILLIN 500 MG/1
CAPSULE ORAL
COMMUNITY
Start: 2023-05-01

## 2023-05-04 NOTE — PROGRESS NOTES
Hematology / Oncology Outpatient Follow Up Note    Edin Suárez 81 y o  male TSM:[id number] SFL:[id number]         Date:  5/4/2023     Assessment / Plan:      1  CLL     Patient is a 76 year old gentleman with chronic lymphocytic leukemia diagnosed in March 2010  He has lymphocytosis with no lymphadenopathy   He has history of iron deficiency anemia for which he was on venofer infusion   In April 2021, he was diagnosed with autoimmune hemolytic anemia for which he was treated with prednisone   His hemoglobin has recovered to within normal limits  Total WBC count has remained stable (28k) with absolute lymphocyte count of 24k  Patient with no palpable lymphadenopathy on physical exam      Given stable CBC parameters and lack of clinical lymphadenopathy, there are no indications to initiate treatment with a BTK inhibitor at this time  Patient was recommended to continue surveillance  Patient was recommended to follow-up in office in 6 months with repeat CBC with differential                                      Subjective:     HPI:       A 81year old gentleman with chronic lymphocytic leukemia diagnosed in March 2010  He has been on watchful waiting because he has absolutely no symptoms from hematology standpoint  Over the last few years, he had slowly progressive microcytic anemia  He was found to have iron deficiency   His GI workup was negative  He was treated with IV iron resulting in normal hemoglobin   In early April 2021, he was hospitalized with autoimmune hemolytic anemia for which she was treated with prednisone, resulting in normal hemoglobin  He is currently off the prednisone  He presents today for routine follow-up  He underwent bladder diverticulosis surgery at [facility] in August 9720 which was complicated with infection  Therefore, he had temporary anemia  His recent CBC showed normal hemoglobin  He has quite stable lymphocytosis    His platelet count is normal   He is asymptomatic from hematology standpoint  He denied fever, chills or night sweats  He has no lymphadenopathy  His performance status is normal        Interval History:      Mr Ajith Sinclair is a 81 Y M who was dx with CLL initially in March 2010  He has maintained on surveillance with CBC monitoring every 6 months  His total WBC count has remained fairly stable  ROS negative for any lymphadenopathy or night sweats  Patient had a recent hospitalization in March for acute cardiac conditions (i e , new onset HFrEF, Vfib, prolonged QT interval/torsades de pointes)  He is now s/p ICD placement and now on new cardiac medications  He has been attending cardiac rehab with no acute complaints  His main concern was not being able to sleep well since March  Voiced frustration regarding not receiving a prescription medication that could potentially help him sleep  He has lost some weight from attending cardiac rehab and thru lifestyle medications, but otherwise denied any acute complaints                                  Objective:      Primary Diagnosis:     1  Chronic lymphocytic leukemia  Diagnosed in March 2010  CD-5, CD-23 positive  Kappa light chain positive  CD-38 negative  ZAP-70 positive disease   Diagnosed in 2010     2  Iron deficiency anemia      3    Autoimmune hemolytic anemia, diagnosed in April 2021      Cancer Staging:  No matching staging information was found for the patient         Previous Hematologic/ Oncologic Treatment:         Prednisone, completed in July 2021      Current Hematologic/ Oncologic Treatment:        observation         Disease Status:      NA     Test Results:     Pathology:           Radiology:           Laboratory:     See below  For CBC CMP and ferritin      Physical Exam:        General Appearance:    Alert, oriented          Eyes:    PERRL   Ears:    Normal external ear canals, both ears   Nose:   Nares normal, septum midline   Throat:   Mucosa moist  Pharynx without injection      Neck:   Supple       Lungs:     Clear to auscultation bilaterally   Chest Wall:    No tenderness or deformity    Heart:    Regular rate and rhythm         Abdomen:     Soft, non-tender, bowel sounds +, no organomegaly               Extremities:   Extremities no cyanosis or edema         Skin:   no rash or icterus  Lymph nodes:   Cervical, supraclavicular, and axillary nodes normal   Neurologic:   CNII-XII intact, normal strength, sensation and reflexes     Throughout           ROS: Review of Systems   Constitutional: Negative for chills and fever  HENT: Negative for ear pain, sore throat, trouble swallowing and voice change  Eyes: Negative for pain and visual disturbance  Respiratory: Negative for cough, chest tightness and shortness of breath  Cardiovascular: Negative for chest pain, palpitations and leg swelling  S/p recent ICD placement    Gastrointestinal: Negative for abdominal pain and vomiting  Genitourinary: Negative for dysuria and hematuria  Musculoskeletal: Negative for arthralgias and back pain  Skin: Negative for color change and rash  Neurological: Negative for dizziness, seizures, syncope, facial asymmetry, light-headedness and headaches  Hematological: Negative for adenopathy  Does not bruise/bleed easily  Psychiatric/Behavioral: Positive for sleep disturbance (difficulty sleeping since recent hospitalization)  Negative for agitation, behavioral problems and confusion  All other systems reviewed and are negative  Imaging: No results found        Labs:   Lab Results   Component Value Date    WBC 28 93 (H) 04/18/2023    HGB 12 3 04/18/2023    HCT 37 2 04/18/2023     (H) 04/18/2023     04/18/2023     Lab Results   Component Value Date     (L) 07/20/2015    K 4 9 04/18/2023     04/18/2023    CO2 27 04/18/2023    ANIONGAP 9 07/20/2015    BUN 12 04/18/2023    CREATININE 0 82 04/18/2023    GLUCOSE 130 01/10/2021    GLUF 122 (H) 04/18/2023    CALCIUM 9 0 04/18/2023 CORRECTEDCA 9 1 09/28/2022    AST 32 04/18/2023    ALT 53 04/18/2023    ALKPHOS 93 04/18/2023    PROT 8 0 07/20/2015    BILITOT 0 47 07/20/2015    EGFR 89 04/18/2023         Lab Results   Component Value Date     10/11/2022         Lab Results   Component Value Date    PSA 0 1 04/18/2023    PSA 0 4 07/29/2021    PSA 0 4 01/27/2021         Lab Results   Component Value Date    IRON 107 04/09/2021    TIBC 324 04/09/2021    FERRITIN 271 10/06/2021       Lab Results   Component Value Date    LXJDMOYJ24 036 01/16/2021       Lab Results   Component Value Date    FOLATE 11 8 01/16/2021         Current Medications: Reviewed  Allergies: Reviewed  PMH/FH/SH:  Reviewed      Vital Sign:    Body surface area is 2 02 meters squared      Wt Readings from Last 3 Encounters:   05/04/23 88 kg (194 lb)   05/01/23 88 kg (194 lb)   04/21/23 87 2 kg (192 lb 3 2 oz)        Temp Readings from Last 3 Encounters:   05/04/23 98 4 °F (36 9 °C) (Tympanic)   04/21/23 98 2 °F (36 8 °C) (Tympanic)   03/28/23 99 3 °F (37 4 °C) (Tympanic)        BP Readings from Last 3 Encounters:   05/04/23 116/78   05/01/23 120/70   04/21/23 124/80         Pulse Readings from Last 3 Encounters:   05/04/23 90   05/01/23 80   04/21/23 70     @LASTSAO2(3)@

## 2023-05-04 NOTE — TELEPHONE ENCOUNTER
"Dr Christin Chicas: Inocencio Thibodeaux responded yesterday  I told him to try a lorazepam at night  I have no obligation to send in a sleeping pill! Tell him he should also exercise a bit 2 hours before bed and try some Meditation  He had a very serious life event just recently and o want to hold off on a be med He can follow up with someone else in my absence for any further discussion\"  I S/w pt and he stated he will try the Lorazepam at bed time along with his Buspar to help him sleep  Pt stated if this does not help over the weekend her will call on Monday          "

## 2023-05-04 NOTE — TELEPHONE ENCOUNTER
Pt called me back and stated that lorazepam does not help  He stated to me that he would like the generic for Lunesta and that it is covered under his health plan   He stated if it can get sent to [facility]

## 2023-05-04 NOTE — TELEPHONE ENCOUNTER
Called pt L/M per Dr Anne Maldonado     Please check in with the patient  [facility] can try lorazepam at bedtime for sleep in the meantime   It does not appear that the medications he requested for sleep are covered by his insurance based on my inquiry

## 2023-05-08 ENCOUNTER — CLINICAL SUPPORT (OUTPATIENT)
Dept: CARDIAC REHAB | Age: 73
End: 2023-05-08

## 2023-05-08 ENCOUNTER — TELEPHONE (OUTPATIENT)
Dept: GASTROENTEROLOGY | Facility: CLINIC | Age: 73
End: 2023-05-08

## 2023-05-08 DIAGNOSIS — I50.22 CHRONIC SYSTOLIC (CONGESTIVE) HEART FAILURE (HCC): ICD-10-CM

## 2023-05-08 NOTE — TELEPHONE ENCOUNTER
Patients GI provider:  Dr Sonido Grant    Number to return call: [phone]     Reason for call: Pt called in requesting to speak with Dr Sonido Grant  Pt says he was scheduled for a procedure but recently had a cardiac arrest  He wishes to speak with Dr Sonido Grant about it       Scheduled procedure/appointment date if applicable: N/A

## 2023-05-10 ENCOUNTER — CLINICAL SUPPORT (OUTPATIENT)
Dept: CARDIAC REHAB | Age: 73
End: 2023-05-10

## 2023-05-10 DIAGNOSIS — I50.22 CHRONIC SYSTOLIC (CONGESTIVE) HEART FAILURE (HCC): ICD-10-CM

## 2023-05-11 ENCOUNTER — CLINICAL SUPPORT (OUTPATIENT)
Dept: CARDIAC REHAB | Age: 73
End: 2023-05-11

## 2023-05-11 DIAGNOSIS — I50.22 CHRONIC SYSTOLIC (CONGESTIVE) HEART FAILURE (HCC): Primary | ICD-10-CM

## 2023-05-11 NOTE — PROGRESS NOTES
See scanned exercise session detailed report Spiritual Care Assessment/Progress Note  [facility]      NAME: Octavio Booth      MRN: [id number]  AGE: 82 y.o. SEX: male  Evangelical Affiliation: Christian   Language: English     8/1/2021     Total Time (in minutes): 62     Spiritual Assessment begun in [address] [place] [address] DEPT through conversation with:         []Patient        [x] Family    [] Friend(s)        Reason for Consult: Code Blue/99     Spiritual beliefs: (Please include comment if needed)     [x] Identifies with a rosendo tradition:  Christian     [] Supported by a rosendo community:            [] Claims no spiritual orientation:           [] Seeking spiritual identity:                [] Adheres to an individual form of spirituality:           [] Not able to assess:                           Identified resources for coping:      [x] Prayer                               [x] Music                  [] Guided Imagery     [x] Family/friends                 [] Pet visits     [] Devotional reading                         [] Unknown     [] Other:                                               Interventions offered during this visit: (See comments for more details)    Patient Interventions: Initial visit, Other (comment) (Silent support and prayer)     Family/Friend(s):  Affirmation of emotions/emotional suffering, Affirmation of rosendo, Bridging, Catharsis/review of pertinent events in supportive environment, Coping skills reviewed/reinforced, Guidance concerning next steps/process to be expected, End of life issues discussed, Iconic (affirming the presence of God/Higher Power), Initial Assessment, Life review/legacy, Normalization of emotional/spiritual concerns, Prayer (assurance of), Reframing, Evangelical beliefs/image of God discussed, Other (comment) (Silent support)     Plan of Care:     [] Support spiritual and/or cultural needs    [] Support AMD and/or advance care planning process      [] Support grieving process   [] Coordinate Rites and/or Rituals    [] Coordination with community clergy   [] No spiritual needs identified at this time   [] Detailed Plan of Care below (See Comments)  [] Make referral to Music Therapy  [] Make referral to Pet Therapy     [] Make referral to Addiction services  [] Make referral to [facility]  [] Make referral to Spiritual Care Partner  [] No future visits requested        [x] Follow up upon further referrals     Comments:  Visited patient in the ED for Code blue per staff request.  Medical staff team were providing care for the patient during the visit. Initially there were no family members and his son Wilbur Arellano eventually came and  provided support. After Wilbur Arellano heard from physician about the death of the patient,  provided support. Wilbur Arellano engaged in life review and shared about being raised by the patient who was his stepfather since 5years old. He shared about their family dynamics, their common [address] He shared to difficulty of facing his father's death soon after his mother who  in May. He seemed to process his emotions verbally and tearfully.  listened with empathy and reflection while exploring his needs as well as facilitating storytelling. Normalized the difficulty of losing loved ones and provided grief support. [facility]  came to the room and gave them privacy, assured of prayer and advised of  availability. Chaplains will follow up if further referrals are requested. Chaplain Randall Pinto M.Div.    can be reached by calling the  at [facility]  [phone]

## 2023-05-15 ENCOUNTER — CLINICAL SUPPORT (OUTPATIENT)
Dept: CARDIAC REHAB | Age: 73
End: 2023-05-15

## 2023-05-15 DIAGNOSIS — I50.22 CHRONIC SYSTOLIC (CONGESTIVE) HEART FAILURE (HCC): ICD-10-CM

## 2023-05-16 ENCOUNTER — APPOINTMENT (OUTPATIENT)
Dept: CARDIAC REHAB | Age: 73
End: 2023-05-16
Payer: MEDICARE

## 2023-05-16 ENCOUNTER — PATIENT MESSAGE (OUTPATIENT)
Dept: FAMILY MEDICINE CLINIC | Facility: CLINIC | Age: 73
End: 2023-05-16

## 2023-05-16 ENCOUNTER — CLINICAL SUPPORT (OUTPATIENT)
Dept: CARDIAC REHAB | Age: 73
End: 2023-05-16

## 2023-05-16 DIAGNOSIS — I50.22 CHRONIC SYSTOLIC (CONGESTIVE) HEART FAILURE (HCC): Primary | ICD-10-CM

## 2023-05-16 DIAGNOSIS — F51.01 PRIMARY INSOMNIA: Primary | ICD-10-CM

## 2023-05-17 ENCOUNTER — CLINICAL SUPPORT (OUTPATIENT)
Dept: CARDIAC REHAB | Age: 73
End: 2023-05-17

## 2023-05-17 DIAGNOSIS — I50.22 CHRONIC SYSTOLIC (CONGESTIVE) HEART FAILURE (HCC): ICD-10-CM

## 2023-05-18 NOTE — PROGRESS NOTES
Cardiac Rehabilitation Plan of Care   30 Day Reassessment          Today's date: 2023   # of Exercise Sessions Completed: 10  Patient name: Kristopher Szymanski      : 1950  Age: 81 y o  MRN: [id number]  Referring Physician: Cindy Jiang DO  Cardiologist: Dr Serge Cruz  Provider: Astrid Son  Clinician: Husam Heredia, MS, CEP, CCRP      Dx:   Encounter Diagnosis   Name Primary? • Chronic systolic (congestive) heart failure ([facility] [address] )      Date of onset: 3/12/23      SUMMARY OF PROGRESS:  23:  Eleno Green is compliant attending cardiac rehab exercise sessions 3x/wk  He tolerates 40-45 mins at 2 1 - 2 9 METs  A light strength training component will be added in a future exercise session  He is tolerating progression of intensity levels to maintain RPE 4-6  Resting BP  110/70 - 130/76 with Normal/flat response to exercise:  120/60- 132/62  Paced rhythm on tele  RHR 72 - 83  with Normal response to exercise reaching 93 - 110  He has not added home exercise d/t walking limitation from hip pain  He reports increased strength and stamina after starting regular exercise at CR  No cardiac complaints  He is not progressing toward wt loss goals with a gain of 1 pounds  Eleno Green stopped daily weights at home since they were always steady  Eleno Green has been working on  dietary modifications with the goal of rare red/processed meats, low fat dairy, reduced added sugars and refined flours  He has abstained from alchohol and marijuana  He as been drinking NA beer and seltzer when out with friends  The patient is a former smoker (quit 43 years ago)  Depression was not reassessed  He admits to anxiety and sees a psychologist 2x/month as well as buspar  Patient reports excellent  social/emotional support from his cousin and close friend  Eleno Green rates their stress as 8/10  Stress management techniques were reinforced  Patient attends group educational classes on cardiac risk factor modification      His exercise "program will be progressed as tolerated to maintain RPE 4-6  The patient has the following personal goals he hopes to achieved by discharge: Lose weight, regular exercise routine  They will continue to be educated on lifestyle modification and encouraged to supplement with a home exercise program as tolerated to reach the following goals in the next 30 days: increased workloads, add strength training, attempt home walking as tolerated by back pain, focus on stress management and healthy eating for wt loss  4/25/23:  Today is Choco Acevedo initial evaluation to begin Cardiac Rehab post CHF, EF 30%  Possible stress-induced CM in vfib arrest setting  Faustina Garcia experienced loss of consciousness while at home, watching a movie in bed  His wife contacted EMS, he was in Vfib and they administered CPR and shocked him 1x  During an echo at the hospital, he went into Vtach to then vfib/torsades  He was transferred to [facility] for dual chamber AICD placement on 3/8  Prior to his event, he said he \"was partying more than usual\"  He was drinking heavily and smoking MJ the night prior  Currently, he denies the use of marijuana and states he is drinking \"clear beer\" or non-alcoholic beers  The patient does not currently follow a formal exercise program at home  He has resumed all ADLs without limitations  Depression screening using the PHQ-9 interprets the patient's score of  8  as  5-9 = Mild Depression  ANNALISE-7 screening tool for anxiety suggests 13  10-14 = Moderate anxiety  When addressed, the patient admits to having anxiety  He was recently taken off his Zoloft and currently is trying to find a replacement for his anti-anxiety medication  Recently started buspirone which he states seems to be helping  He states problems sleeping  Encouraged avoiding blue light at night, try meditation  He has been using Melotonin with success  Patient reports excellent social/emotional support from his psychologist, cousin and a close friend    " Information to utilize [employer] was provided as well as contact information for counseling through [facility]  PHQ-9 score will be reassessed in 30 days due to an initial score revealing concern for depression  They rate stress 6/10 with the following stressors: wife, worried about another cardiac arrest, misses drinking/smoking with his friends at the bar  Stress management will be reviewed  The patient is a former smoker (quit 43 years ago), marijuana user and has been abstaining from [address] currently  He was counseled on effects of smoking  Patient admits to 100% medication compliance  They report the following physical limitations: none  The patient completed an initial 6MWT  The patient walked  [phone] [id number] 66 feet/2  3METs  Resting  /60 with Normal response to exercise reaching 138/80  Blood Pressure will be monitored throughout the program and cardiologist will be notified of elevated trends  Patient reported symptoms with exercise including LBP - 1 standing rest break taken    Telemetry revealed Paced  Kavitha Salgado was counseled on exercise guidelines to achieve a minimum of 150 mins/wk of moderate intensity (RPE 4-6) exercise and encouraged to add 1-2 days of exercise on opposite days of cardiac rehab as tolerated  We discussed current dietary habits and goals of heart healthy eating for lipid management and weight loss  Patient's personal goals include: Lose weight, regular exercise routine  The patient's CAD risk factors include:  inactivity, hypertension, hyperlipidemia and smoking  His education will focus on lifestyle modification/education specific to His needs  Patient will attend group education classes on heart healthy eating, reading food labels, stress management, risk factor reduction, understanding heart disease and common heart medications  Patient will attend 35 monitored exercise sessions, 3x/wk for 12-18 weeks beginning 5/1/23         Medication compliance: Yes   Comments: Pt reports to be compliant with medications  Fall Risk: Low   Comments: Ambulates with a steady gait with no assist device and Denies a fall in the past 6 months    EKG Interpretation: Paced      EXERCISE ASSESSMENT and PLAN    Exercise Prescription:      Frequency: 3 days/week   Supplement with home exercise 2+ days/wk as tolerated       Minutes: 40        METS: 2 0-3 0            HR: RHR+30   RPE: 4-6         Modalities: Treadmill, UBE, NuStep and Recumbent bike      30 Day Goals for Exercise Progression:    Frequency: 3 days/week of cardiac rehab       Supplement with home exercise 2+ days/wk as tolerated    Minutes: 40                              >150 mins/wk of moderate intensity exercise   METS: 2 5-4   HR: RHR=+30bpm    RPE: 4-6   Modalities: Treadmill, UBE, Lifecycle, NuStep and Recumbent bike    Strength training:  will be added in a future session   Modalities: Leg Press, Chest Press, Pull Downs and Lateral Raise    Home Exercise: none    Goals: 10% improvement in functional capacity - based on max METs achieved in fitness assessment, improved DASI score by 10%, Resume ADLs with increased strength, Attend Rehab regularly and start a home exercise program    Progression Toward Goals:  Pt is progressing and showing improvement  toward the following goals:  10% improvement in functional capacity - based on max METs achieved in fitness assessment, improved DASI score by 10%, Resume ADLs with increased strength, Attend Rehab regularly and start a home exercise program   , Patient will add home walking as tolerated in the next 30 days    Education: home exercise guidelines, AHA guidelines to achieve >150 mins/wk of moderate exercise, RPE scale and Long QT   Plan:education on home exercise guidelines and home exercise 30+ mins 2 days opposite CR  Readiness to change: Preparation:  (Getting ready to change)       NUTRITION ASSESSMENT AND PLAN    Weight control:    Starting weight: 191   Current weight: 192    Lipid management: Last lipid profile 4/18/23  Chol 118    HDL 31  LDL 63    Goals:Improved Rate Your Plate score  >00, choose lean meat (93-95%), reduce portion sizes of meat to 3oz or less, Eat 4-5 cups of fruits and vegetables daily, eliminate or choose low-fat sweets and weight loss    Measurable goals were based Rate Your Plate Dietary Self-Assessment  These are the areas in which the patient could score higher on the assessment  Goals include recommendations for a heart healthy diet based on American Heart Association  Progression Toward Goals: Pt is progressing and showing improvement  toward the following goals:  Improved Rate Your Plate score  >24, choose lean meat (93-95%), reduce portion sizes of meat to 3oz or less, Eat 4-5 cups of fruits and vegetables daily, eliminate or choose low-fat sweets and weight loss    , Patient will eliminate soda, follow low sodium diet, increase fish intake in the next 30 days, he has abstained from alcohol and marijuana    Education: heart healthy eating  low sodium diet  hydration  nutrition for  lipid management  wt  loss   portion control  education class:  Label Reading  Plan: Education Class: Heart Healthy Eating, reduce portion sizes, eat fewer desserts and sweets, avoid processed foods, will replace sugar sweetened cereals with whole grain or oatmeal and abstain from alcohol  Readiness to change: Preparation:  (Getting ready to change)       PSYCHOSOCIAL ASSESSMENT AND PLAN    Emotional:  Depression assessment:  PHQ-9 = 8  5-9 = Mild Depression            Anxiety measure:  ANNALISE-7 = 13  10-14 = Moderate anxiety  Self-reported stress level:  6  Social support: Patient reports excellent emotional/social support from psychologist, cousin and Patient has friends available for support when needed     Goals:  Reduce perceived stress to 1-3/10, PHQ-9 - reduced severity by one level, follow up with therapist, Feelings in [facility] Score < 3, Physical Fitness in [place] Score < 3, Daily Activity in [place] Score < 3, Social Activities in [place] Score < 3, Overall Health in [place] Score < 3, Quality of Life in [place] Score < 3 , improved sleep, improved positive thoughts of well being, increased energy, stop worrying, Feel less anxious and Handle anger/frustration better    Progression Toward Goals: Pt has not made progress toward the following goals: Reduce perceived stress to 1-3/10, PHQ-9 - reduced severity by one level, follow up with therapist, Feelings in [place] Score < 3, Physical Fitness in [place] Score < 3, Daily Activity in [place] Score < 3, Social Activities in [place] Score < 3, Overall Health in [place] Score < 3, Quality of Life in [place] Score < 3 , improved sleep, improved positive thoughts of well being, increased energy, stop worrying, Feel less anxious and Handle anger/frustration better  , Patient will f/u with psychotherapist, take buspar as needed in the next 30 days, Will continue to educate and progress as tolerated  Education: benefits of a positive support system and stress management techniques  Plan: Class: Stress and Your Health, Class: Relaxation, PHQ-9 >5 will refer to MD, Refer to [employer], Exercise, Join a support group  and maintain visits with therapist, practice meditation and hang out with friends  Readiness to change: Preparation:  (Getting ready to change)       OTHER CORE COMPONENTS     Tobacco:   Social History     Tobacco Use   Smoking Status Former   • Packs/day: 1 00   • Years: 15    • Pack years: 15    • Types: Cigarettes   • Start date: 67   • Quit date: 1980   • Years since quittin 7   Smokeless Tobacco Never       Tobacco Use Intervention:   Pt quit many years ago   and has abstained    Anginal Symptoms:  None   NTG use: No prescription    Blood pressure:    Restin/70 - 130/76   Exercise: 120/60- 132/62      Goals: moderate intensity exercise >150 mins/wk, medication compliance, Abstain from smoking and abstain from marijuana    Progression Toward Goals: Pt is progressing and showing improvement  toward the following goals:  moderate intensity exercise >150 mins/wk, medication compliance, Abstain from smoking and abstain from marijuana   , Patient will avoid processed foods, add home exercise in the next 30 days, Will continue to educate and progress as tolerated      Education:  low sodium diet and HTN, smoking and heart disease, relapse education, smoking cessation and effecs of alcohol/MJ   Plan: Class: Understanding Heart Disease, Class: Common Heart Medications, Complete abstention from smoking and engage in regular exercise  Readiness to change: Preparation:  (Getting ready to change)

## 2023-05-19 RX ORDER — ZOLPIDEM TARTRATE 5 MG/1
TABLET ORAL
Qty: 30 TABLET | Refills: 0 | Status: SHIPPED | OUTPATIENT
Start: 2023-05-19

## 2023-05-22 ENCOUNTER — CLINICAL SUPPORT (OUTPATIENT)
Dept: CARDIAC REHAB | Age: 73
End: 2023-05-22

## 2023-05-22 DIAGNOSIS — I50.22 CHRONIC SYSTOLIC (CONGESTIVE) HEART FAILURE (HCC): ICD-10-CM

## 2023-05-23 ENCOUNTER — APPOINTMENT (OUTPATIENT)
Dept: CARDIAC REHAB | Age: 73
End: 2023-05-23
Payer: MEDICARE

## 2023-05-24 ENCOUNTER — APPOINTMENT (OUTPATIENT)
Dept: CARDIAC REHAB | Age: 73
End: 2023-05-24
Payer: MEDICARE

## 2023-05-30 ENCOUNTER — CLINICAL SUPPORT (OUTPATIENT)
Dept: CARDIAC REHAB | Age: 73
End: 2023-05-30

## 2023-05-30 DIAGNOSIS — I50.22 CHRONIC SYSTOLIC (CONGESTIVE) HEART FAILURE (HCC): Primary | ICD-10-CM

## 2023-05-31 ENCOUNTER — CLINICAL SUPPORT (OUTPATIENT)
Dept: CARDIAC REHAB | Age: 73
End: 2023-05-31

## 2023-05-31 DIAGNOSIS — I50.22 CHRONIC SYSTOLIC (CONGESTIVE) HEART FAILURE (HCC): ICD-10-CM

## 2023-06-01 ENCOUNTER — CLINICAL SUPPORT (OUTPATIENT)
Dept: CARDIAC REHAB | Age: 73
End: 2023-06-01

## 2023-06-01 DIAGNOSIS — I50.22 CHRONIC SYSTOLIC (CONGESTIVE) HEART FAILURE (HCC): Primary | ICD-10-CM

## 2023-06-05 ENCOUNTER — CLINICAL SUPPORT (OUTPATIENT)
Dept: CARDIAC REHAB | Age: 73
End: 2023-06-05
Payer: MEDICARE

## 2023-06-05 DIAGNOSIS — I50.22 CHRONIC SYSTOLIC HEART FAILURE (HCC): ICD-10-CM

## 2023-06-05 PROCEDURE — 93798 PHYS/QHP OP CAR RHAB W/ECG: CPT

## 2023-06-07 ENCOUNTER — CLINICAL SUPPORT (OUTPATIENT)
Dept: CARDIAC REHAB | Age: 73
End: 2023-06-07
Payer: MEDICARE

## 2023-06-07 DIAGNOSIS — I50.22 CHRONIC SYSTOLIC HEART FAILURE (HCC): ICD-10-CM

## 2023-06-07 PROCEDURE — 93798 PHYS/QHP OP CAR RHAB W/ECG: CPT

## 2023-06-08 ENCOUNTER — CLINICAL SUPPORT (OUTPATIENT)
Dept: CARDIAC REHAB | Age: 73
End: 2023-06-08
Payer: MEDICARE

## 2023-06-08 DIAGNOSIS — I50.22 CHRONIC SYSTOLIC HEART FAILURE (HCC): Primary | ICD-10-CM

## 2023-06-08 PROCEDURE — 93798 PHYS/QHP OP CAR RHAB W/ECG: CPT

## 2023-06-12 ENCOUNTER — TELEPHONE (OUTPATIENT)
Dept: PULMONOLOGY | Facility: MEDICAL CENTER | Age: 73
End: 2023-06-12

## 2023-06-12 ENCOUNTER — CLINICAL SUPPORT (OUTPATIENT)
Dept: CARDIAC REHAB | Age: 73
End: 2023-06-12
Payer: MEDICARE

## 2023-06-12 DIAGNOSIS — I50.22 CHRONIC SYSTOLIC HEART FAILURE (HCC): ICD-10-CM

## 2023-06-12 PROCEDURE — 93798 PHYS/QHP OP CAR RHAB W/ECG: CPT

## 2023-06-12 NOTE — TELEPHONE ENCOUNTER
Pt martinm stating that he was supposed to be schedule at the [facility] but is scheduled at [facility]  He would like a call back to verify appt location

## 2023-06-13 ENCOUNTER — IN-CLINIC DEVICE VISIT (OUTPATIENT)
Dept: CARDIOLOGY CLINIC | Facility: CLINIC | Age: 73
End: 2023-06-13
Payer: MEDICARE

## 2023-06-13 ENCOUNTER — CLINICAL SUPPORT (OUTPATIENT)
Dept: CARDIAC REHAB | Age: 73
End: 2023-06-13
Payer: MEDICARE

## 2023-06-13 DIAGNOSIS — I50.22 CHRONIC SYSTOLIC HEART FAILURE (HCC): Primary | ICD-10-CM

## 2023-06-13 DIAGNOSIS — Z95.810 PRESENCE OF AUTOMATIC CARDIOVERTER/DEFIBRILLATOR (AICD): Primary | ICD-10-CM

## 2023-06-13 PROCEDURE — 93284 PRGRMG EVAL IMPLANTABLE DFB: CPT | Performed by: INTERNAL MEDICINE

## 2023-06-13 PROCEDURE — 93798 PHYS/QHP OP CAR RHAB W/ECG: CPT

## 2023-06-13 NOTE — PROGRESS NOTES
Cardiac Rehabilitation Plan of Care   60 Day Reassessment          Today's date: 2023   # of Exercise Sessions Completed: 18  Patient name: Rosalia Montgomery      : 1950  Age: 81 y o  MRN: [id number]  Referring Physician: Alka Vincent DO  Cardiologist: Dr Ernie Silva  Provider: Johnnie Mann  Clinician: Silvina Vazquez, MS, CEP      Dx:   Encounter Diagnosis   Name Primary? • Chronic systolic heart failure ([address] ) Yes     Date of onset: 3/12/23      SUMMARY OF PROGRESS:    23:  Pt was not available for discussion at the time this note is being written  Holger Taveras is compliant attending cardiac rehab exercise sessions 3x/wk  He tolerates 40-45 mins at 3 0-3 3 METs  A light strength training component has been added to their exercise program    He is tolerating progression of intensity levels to maintain RPE 4-6  Resting BP  110//80 with Normal/flat response to exercise: 132//80  Paced rhythm on tele  RHR 72-83  with Normal response to exercise reaching 93 - 104  He has not added home exercise d/t walking limitation from hip pain  He reports increased strength and stamina after starting regular exercise at [facility]  No cardiac complaints  He is not progressing toward wt loss goals with a gain of 1 pounds  Holger Taveras stopped daily weights at home since they were always steady  Holger Taveras has been working on  dietary modifications with the goal of rare red/processed meats, low fat dairy, reduced added sugars and refined flours  He has abstained from alchohol and marijuana  He has returned to drinking alcohol in moderation  The patient is a former smoker (quit 43 years ago)  Depression was not reassessed  He admits to anxiety and sees a psychologist 2x/month as well as buspar  Patient reports excellent  social/emotional support from his cousin and close friend  Holger Taveras rates their stress as 8/10  Stress management techniques were reinforced    Patient attends group educational classes on cardiac risk factor modification  His exercise program will be progressed as tolerated to maintain RPE 4-6  The patient has the following personal goals he hopes to achieved by discharge: Lose weight, regular exercise routine  They will continue to be educated on lifestyle modification and encouraged to supplement with a home exercise program as tolerated to reach the following goals in the next 30 days: increased workloads, attempt home walking as tolerated by back pain, focus on stress management and healthy eating for wt loss        Medication compliance: Yes   Comments: Pt reports to be compliant with medications  Fall Risk: Low   Comments: Ambulates with a steady gait with no assist device and Denies a fall in the past 6 months    EKG Interpretation: Paced      EXERCISE ASSESSMENT and PLAN    Exercise Prescription:      Frequency: 3 days/week   Supplement with home exercise 2+ days/wk as tolerated       Minutes: 40        METS: 3 0-3 3           HR: RHR+30   RPE: 4-6         Modalities: Treadmill, UBE, NuStep and Recumbent bike      30 Day Goals for Exercise Progression:    Frequency: 3 days/week of cardiac rehab       Supplement with home exercise 2+ days/wk as tolerated    Minutes: 40                              >150 mins/wk of moderate intensity exercise   METS: 3 1-3 8   HR: RHR+30bpm    RPE: 4-6   Modalities: Treadmill, UBE, Lifecycle, NuStep and Recumbent bike    Strength trainin-3 days / week  12-15 repetitions  1-2 sets per modality    Modalities: Leg Press, Chest Press, Pull Downs and Lateral Raise    Home Exercise: none    Goals: 10% improvement in functional capacity - based on max METs achieved in fitness assessment, improved DASI score by 10%, Resume ADLs with increased strength, Attend Rehab regularly and start a home exercise program    Progression Toward Goals:  Pt is progressing and showing improvement  toward the following goals:  10% improvement in functional capacity - based on max METs achieved in fitness assessment, improved DASI score by 10%, Resume ADLs with increased strength, Attend Rehab regularly and start a home exercise program   , Pt has not made progress toward the following goals: home exercise  Education: home exercise guidelines, AHA guidelines to achieve >150 mins/wk of moderate exercise, RPE scale and Long QT   Plan:education on home exercise guidelines and home exercise 30+ mins 2 days opposite CR  Readiness to change: Contemplation:  (Acknowledging that there is a problem but not yet ready or sure of wanting to make a change) and Action:  (Changing behavior)      NUTRITION ASSESSMENT AND PLAN    Weight control:    Starting weight: 191   Current weight:   192    Lipid management: Last lipid profile 4/18/23  Chol 118    HDL 31  LDL 63    Goals:Improved Rate Your Plate score  >89, choose lean meat (93-95%), reduce portion sizes of meat to 3oz or less, Eat 4-5 cups of fruits and vegetables daily, eliminate or choose low-fat sweets and weight loss    Measurable goals were based Rate Your Plate Dietary Self-Assessment  These are the areas in which the patient could score higher on the assessment  Goals include recommendations for a heart healthy diet based on American Heart Association  Progression Toward Goals: Pt is progressing and showing improvement  toward the following goals:  Improved Rate Your Plate score  >06, choose lean meat (93-95%), reduce portion sizes of meat to 3oz or less, Eat 4-5 cups of fruits and vegetables daily, eliminate or choose low-fat sweets and weight loss  , Patient will eliminate soda, follow low sodium diet, increase fish intake in the next 30 days, he has abstained from marijuana and drinking in moderation      Education: heart healthy eating  low sodium diet  hydration  nutrition for  lipid management  wt  loss   portion control  education class:  Label Reading  Plan: Education Class: Heart Healthy Eating, reduce portion sizes, eat fewer desserts and sweets, avoid processed foods, will replace sugar sweetened cereals with whole grain or oatmeal and abstain from alcohol  Readiness to change: Preparation:  (Getting ready to change)       PSYCHOSOCIAL ASSESSMENT AND PLAN    Emotional:  Depression assessment:  PHQ-9 = 8  5-9 = Mild Depression            Anxiety measure:  ANNALISE-7 = 13  10-14 = Moderate anxiety  Self-reported stress level:  6  Social support: Patient reports excellent emotional/social support from psychologist, cousin and Patient has friends available for support when needed     Goals:  Reduce perceived stress to 1-3/10, PHQ-9 - reduced severity by one level, follow up with therapist, Feelings in [place] Score < 3, Physical Fitness in [place] Score < 3, Daily Activity in [place] Score < 3, Social Activities in [place] Score < 3, Overall Health in [place] Score < 3, Quality of Life in [place] Score < 3 , improved sleep, improved positive thoughts of well being, increased energy, stop worrying, Feel less anxious and Handle anger/frustration better    Progression Toward Goals: Pt has not made progress toward the following goals: Reduce perceived stress to 1-3/10, PHQ-9 - reduced severity by one level, follow up with therapist, Feelings in [place] Score < 3, Physical Fitness in [place] Score < 3, Daily Activity in [place] Score < 3, Social Activities in [place] Score < 3, Overall Health in [place] Score < 3, Quality of Life in [place] Score < 3 , improved sleep, improved positive thoughts of well being, increased energy, stop worrying, Feel less anxious and Handle anger/frustration better  , Patient will f/u with psychotherapist, take buspar as needed in the next 30 days, Will continue to educate and progress as tolerated , He socializes at the club       Education: benefits of a positive support system, stress management techniques, class:  Stress and Your Health  and class:  Relaxation  Plan: PHQ-9 >5 will refer to MD, Refer to [facility]ass[facility], Exercise, Join a support group  and maintain visits with therapist, practice meditation and hang out with friends  Readiness to change: Preparation:  (Getting ready to change)       OTHER CORE COMPONENTS     Tobacco:   Social History     Tobacco Use   Smoking Status Former   • Packs/day: 1 00   • Years: 15 00   • Total pack years: 15 00   • Types: Cigarettes   • Start date: 67   • Quit date: 1980   • Years since quittin 7   Smokeless Tobacco Never       Tobacco Use Intervention:   Pt quit many years ago   and has abstained    Anginal Symptoms:  None   NTG use: No prescription    Blood pressure:    Restin//80   Exercise: 132//80    Goals: moderate intensity exercise >150 mins/wk, medication compliance, Abstain from smoking and abstain from marijuana    Progression Toward Goals: Pt is progressing and showing improvement  toward the following goals:  moderate intensity exercise >150 mins/wk, medication compliance, Abstain from smoking and abstain from marijuana  , Pt has not made progress toward the following goals: home exercise  , Will continue to educate and progress as tolerated      Education:  low sodium diet and HTN, smoking and heart disease, relapse education, smoking cessation and effecs of alcohol/MJ   Plan: Class: Understanding Heart Disease, Class: Common Heart Medications, Complete abstention from smoking and engage in regular exercise  Readiness to change: Preparation:  (Getting ready to change)

## 2023-06-13 NOTE — TELEPHONE ENCOUNTER
Lvm for pt to call back, he is scheduled in [facility] but if he wants to reschedule that is fine as well

## 2023-06-13 NOTE — PROGRESS NOTES
Results for orders placed or performed in visit on 06/13/23   Cardiac EP device report    Narrative    MDT BI-V ICD (DDIR) [address] [place] [address]  INTERROGATED IN THE [facility] OFFICE  BATTERY VOLTAGE ADEQUATE  (6 7 YRS) AP 90%  97%  ALL LEAD PARAMETERS WITHIN NORMAL LIMITS  NO SIGNIFICANT HIGH RATE EPISODES  DECREASE MADE TO AMPLITUDE TO PROMOTE DEVICE LONGEVITY WHILE MAINTAINING AN APPROPRIATE SAFETY MARGIN  OPTI-VOL WITHIN NORMAL LIMITS  NORMAL DEVICE FUNCTION  ---MACK

## 2023-06-14 ENCOUNTER — CLINICAL SUPPORT (OUTPATIENT)
Dept: CARDIAC REHAB | Age: 73
End: 2023-06-14
Payer: MEDICARE

## 2023-06-14 DIAGNOSIS — I50.22 CHRONIC SYSTOLIC HEART FAILURE (HCC): ICD-10-CM

## 2023-06-14 PROCEDURE — 93798 PHYS/QHP OP CAR RHAB W/ECG: CPT

## 2023-06-15 ENCOUNTER — APPOINTMENT (OUTPATIENT)
Dept: CARDIAC REHAB | Age: 73
End: 2023-06-15
Payer: MEDICARE

## 2023-06-19 ENCOUNTER — APPOINTMENT (OUTPATIENT)
Dept: LAB | Age: 73
End: 2023-06-19
Payer: MEDICARE

## 2023-06-19 ENCOUNTER — CLINICAL SUPPORT (OUTPATIENT)
Dept: CARDIAC REHAB | Age: 73
End: 2023-06-19
Payer: MEDICARE

## 2023-06-19 DIAGNOSIS — R39.9 UTI SYMPTOMS: Primary | ICD-10-CM

## 2023-06-19 DIAGNOSIS — I49.01 VENTRICULAR FIBRILLATION (HCC): ICD-10-CM

## 2023-06-19 DIAGNOSIS — I10 ESSENTIAL HYPERTENSION: Chronic | ICD-10-CM

## 2023-06-19 DIAGNOSIS — F41.9 ANXIETY AND DEPRESSION: ICD-10-CM

## 2023-06-19 DIAGNOSIS — D59.10 AUTOIMMUNE HEMOLYTIC ANEMIA (HCC): ICD-10-CM

## 2023-06-19 DIAGNOSIS — C91.10 CLL (CHRONIC LYMPHOCYTIC LEUKEMIA) (HCC): Chronic | ICD-10-CM

## 2023-06-19 DIAGNOSIS — F32.A ANXIETY AND DEPRESSION: ICD-10-CM

## 2023-06-19 DIAGNOSIS — I50.22 CHRONIC SYSTOLIC HEART FAILURE (HCC): ICD-10-CM

## 2023-06-19 DIAGNOSIS — E87.1 HYPONATREMIA: ICD-10-CM

## 2023-06-19 DIAGNOSIS — E78.00 HYPERCHOLESTEROLEMIA: Chronic | ICD-10-CM

## 2023-06-19 DIAGNOSIS — D50.9 IRON DEFICIENCY ANEMIA, UNSPECIFIED IRON DEFICIENCY ANEMIA TYPE: Chronic | ICD-10-CM

## 2023-06-19 DIAGNOSIS — I48.0 PAROXYSMAL A-FIB (HCC): ICD-10-CM

## 2023-06-19 DIAGNOSIS — Z12.5 PROSTATE CANCER SCREENING: ICD-10-CM

## 2023-06-19 DIAGNOSIS — I45.81 LONG QT SYNDROME TYPE 2: ICD-10-CM

## 2023-06-19 DIAGNOSIS — I50.42 CHRONIC COMBINED SYSTOLIC AND DIASTOLIC CONGESTIVE HEART FAILURE (HCC): ICD-10-CM

## 2023-06-19 PROCEDURE — 87086 URINE CULTURE/COLONY COUNT: CPT

## 2023-06-19 PROCEDURE — 87147 CULTURE TYPE IMMUNOLOGIC: CPT

## 2023-06-19 PROCEDURE — 87077 CULTURE AEROBIC IDENTIFY: CPT

## 2023-06-19 PROCEDURE — 87186 SC STD MICRODIL/AGAR DIL: CPT

## 2023-06-19 PROCEDURE — 93798 PHYS/QHP OP CAR RHAB W/ECG: CPT

## 2023-06-20 ENCOUNTER — TELEPHONE (OUTPATIENT)
Dept: UROLOGY | Facility: AMBULATORY SURGERY CENTER | Age: 73
End: 2023-06-20

## 2023-06-20 ENCOUNTER — TELEPHONE (OUTPATIENT)
Dept: UROLOGY | Facility: CLINIC | Age: 73
End: 2023-06-20

## 2023-06-20 DIAGNOSIS — N30.00 ACUTE CYSTITIS WITHOUT HEMATURIA: Primary | ICD-10-CM

## 2023-06-20 DIAGNOSIS — N30.01 ACUTE CYSTITIS WITH HEMATURIA: Primary | ICD-10-CM

## 2023-06-20 RX ORDER — NITROFURANTOIN 25; 75 MG/1; MG/1
100 CAPSULE ORAL 2 TIMES DAILY
Qty: 10 CAPSULE | Refills: 0 | Status: SHIPPED | OUTPATIENT
Start: 2023-06-20 | End: 2023-06-25

## 2023-06-20 RX ORDER — CIPROFLOXACIN 500 MG/1
500 TABLET, FILM COATED ORAL 2 TIMES DAILY
Qty: 10 TABLET | Refills: 0 | Status: SHIPPED | OUTPATIENT
Start: 2023-06-20 | End: 2023-06-21

## 2023-06-20 NOTE — TELEPHONE ENCOUNTER
Left generic message per communication form advising patient to contact the office regarding Dr Lev Talley note  Office number provided  When patient calls back, please advise of Dr Lev Talley note

## 2023-06-20 NOTE — TELEPHONE ENCOUNTER
"Lavinia Murphy call in very upset as he said \"that Cipro will kill him as he has QT and he can't take Cipro it should be in his chart and the doctor should know this\" Told Him I will discuss with the doctor and will have appropriate antibiotic sent to [address], [address]  "

## 2023-06-20 NOTE — TELEPHONE ENCOUNTER
Please call Vee Johnson and tell him I have switched his Cipro to [address]  Oral Keflex is not the proper antibiotic as his most recent bacteria was resistant

## 2023-06-20 NOTE — TELEPHONE ENCOUNTER
Pt called and stated he CAN NOT take cipro he needs to be given keflex due to having QT  Pt is also asking for a standing order for urine culture due to pt having UTI frequently due to self cathing 4 times a day      Pt call back-[phone]

## 2023-06-20 NOTE — TELEPHONE ENCOUNTER
[address] called  Cipro was called in today and the pharmacy is calling to state that there is a high risk with this medication qt prolongation  They said last time he was on keflex      Please advise

## 2023-06-20 NOTE — TELEPHONE ENCOUNTER
Called Low Barron back and notified him that ABX was changed to [address] was sent to pharmacy - He stated again that he  should not have Cipro because he ahs  QT   Also requested a sooner Cysto appointment as he is not scheduled until October - checked [facility] and there are no sooner appointment for Cysto    Placed standing orders for urine studies   He is wondering if he should have a prophylactic ABX for  Him as he gets UTI from CIC 4x a day

## 2023-06-20 NOTE — TELEPHONE ENCOUNTER
Patient with recurrent gram-negative rods in the urine  Based on prior cultures I will prescribe Cipro 500 mg twice daily for 5-day course  Please call patient and tell him antibiotics were sent to his pharmacy  He should begin taking antibiotics today

## 2023-06-21 ENCOUNTER — CLINICAL SUPPORT (OUTPATIENT)
Dept: CARDIAC REHAB | Age: 73
End: 2023-06-21
Payer: MEDICARE

## 2023-06-21 DIAGNOSIS — I50.22 CHRONIC SYSTOLIC HEART FAILURE (HCC): ICD-10-CM

## 2023-06-21 PROCEDURE — 93798 PHYS/QHP OP CAR RHAB W/ECG: CPT

## 2023-06-22 ENCOUNTER — CLINICAL SUPPORT (OUTPATIENT)
Dept: CARDIAC REHAB | Age: 73
End: 2023-06-22
Payer: MEDICARE

## 2023-06-22 DIAGNOSIS — I50.22 CHRONIC SYSTOLIC HEART FAILURE (HCC): Primary | ICD-10-CM

## 2023-06-22 PROCEDURE — 93798 PHYS/QHP OP CAR RHAB W/ECG: CPT

## 2023-06-22 NOTE — TELEPHONE ENCOUNTER
"Called and informed patient that we adjusted his allergy list to include cipro as long QT syndrome not simply tachycardia as was listed previously  Changed rx to macrobid by FT  Standing orders for ucx placed  Patient stated \"he knew already and is waiting for Kash Giron to get back to him regarding a few things  \"   "

## 2023-06-23 LAB
BACTERIA UR CULT: ABNORMAL
BACTERIA UR CULT: ABNORMAL

## 2023-06-23 NOTE — TELEPHONE ENCOUNTER
Patient calling in requesting to speak with clinical staff regarding his UC results  Patient states he can see in his chart that the bacteria keeps changing and he is concerned he is not being treated with the right medication  Patient would like call back ASAP       CB: [phone]

## 2023-06-23 NOTE — TELEPHONE ENCOUNTER
"Called Arabella Meyer back and he yelled at me for 10 minutes about his medication and then about his CIC why he had to do it - said he has to urinate  - He is yelling at me why he can't have a prophollatic ABX- he went to [facility] and now he's back maybe he should change doctors  He could have sepsis - : you people do nothing\" He says his urine still dark  He says the Internet says that he should have another antibiotic  I tried to explain to him that the lab states the his ABX treats both strains - Asked him not to yell at me  He is question our knowledge  He thinks he needs a sooner Cysto because he does CIC and keeps getting uti's   He then said maybe he should go elsewhere and hung up  "

## 2023-06-26 ENCOUNTER — APPOINTMENT (OUTPATIENT)
Dept: LAB | Age: 73
End: 2023-06-26

## 2023-06-26 ENCOUNTER — CLINICAL SUPPORT (OUTPATIENT)
Dept: CARDIAC REHAB | Age: 73
End: 2023-06-26
Payer: MEDICARE

## 2023-06-26 DIAGNOSIS — I50.22 CHRONIC SYSTOLIC HEART FAILURE (HCC): ICD-10-CM

## 2023-06-26 PROCEDURE — 93798 PHYS/QHP OP CAR RHAB W/ECG: CPT

## 2023-06-27 ENCOUNTER — APPOINTMENT (OUTPATIENT)
Dept: LAB | Facility: MEDICAL CENTER | Age: 73
DRG: 690 | End: 2023-06-27
Payer: MEDICARE

## 2023-06-27 LAB
BACTERIA UR QL AUTO: ABNORMAL /HPF
BILIRUB UR QL STRIP: NEGATIVE
CLARITY UR: CLEAR
COLOR UR: ABNORMAL
GLUCOSE UR STRIP-MCNC: NEGATIVE MG/DL
HGB UR QL STRIP.AUTO: NEGATIVE
HYALINE CASTS #/AREA URNS LPF: ABNORMAL /LPF
KETONES UR STRIP-MCNC: NEGATIVE MG/DL
LEUKOCYTE ESTERASE UR QL STRIP: ABNORMAL
NITRITE UR QL STRIP: POSITIVE
NON-SQ EPI CELLS URNS QL MICRO: ABNORMAL /HPF
PH UR STRIP.AUTO: 6 [PH]
PROT UR STRIP-MCNC: NEGATIVE MG/DL
RBC #/AREA URNS AUTO: ABNORMAL /HPF
SP GR UR STRIP.AUTO: 1.01 (ref 1–1.03)
UROBILINOGEN UR STRIP-ACNC: <2 MG/DL
WBC #/AREA URNS AUTO: ABNORMAL /HPF

## 2023-06-27 PROCEDURE — 87186 SC STD MICRODIL/AGAR DIL: CPT | Performed by: UROLOGY

## 2023-06-27 PROCEDURE — 87086 URINE CULTURE/COLONY COUNT: CPT | Performed by: UROLOGY

## 2023-06-27 PROCEDURE — 81001 URINALYSIS AUTO W/SCOPE: CPT | Performed by: UROLOGY

## 2023-06-27 PROCEDURE — 87077 CULTURE AEROBIC IDENTIFY: CPT | Performed by: UROLOGY

## 2023-06-27 NOTE — TELEPHONE ENCOUNTER
Patient calling for recommendations  Patient is worried and does not feel right  He stated he having pain that is radiating now into his lower back and having burning   Patient has a history of sepsis and does not want to wait until his UA and urine culture comes back to be prescribed something    Patient has Long QT syndrome and can only be prescribed certain medications    Transferred patient to Triage nurse Diane Bruce

## 2023-06-27 NOTE — TELEPHONE ENCOUNTER
Call transferred by psr   Patient states he did go for urine testing this morning  He requesting to have an antibiotic for suspected uti symptoms  Urine is reported as dark in color, pain from groin radiating to left leg, no fever, chills  Reviewed recommendations with provider  TT provider

## 2023-06-27 NOTE — TELEPHONE ENCOUNTER
"[facility]   Patient states \" he very upset about the conversation he just had on previous call back  Patient state \" that he is concerned about his symptoms and possibly having an infections with his history of sepsis and hospitalizations, is requesting treatment until the UC is finalized   He is not sure at this point what he should do   He has been self catheterizing  for many years and is able to do that appropriately   He is not sure if he needs to just come down to the office and figure it out one way or another \"    Reached out to office , spoke with RN will notify provider with patients concerns, contact patient  "

## 2023-06-27 NOTE — TELEPHONE ENCOUNTER
Called Deidra Singh back and offered him an appointment 7/5 @ 145  He said he spoke with Melinda   and urine studies we completed today  He needs medication now for his frequent UTI's   Suggested maybe we could to a refresher on his CIC since he is getting UTI so often - He stated yelling and screaming at me that I have poor nursing skills and put a report in about me  - I have a witness in the office of my tone and words and told Him to stop yelling and that I was going to have call disconnected if he continuedelling  - which he did and call discontinued

## 2023-06-27 NOTE — TELEPHONE ENCOUNTER
Patient called back stating He spoke to the office and he spoke to the nurse and she did not addressed his issue   He is very upset and he knows he is getting a uti again and he wants to know how to proceed  He does not want to go sepsis waiting for results    Warm transfer to Triage Nurse [facility]    Patient can be reached at [phone]

## 2023-06-28 ENCOUNTER — CLINICAL SUPPORT (OUTPATIENT)
Dept: CARDIAC REHAB | Age: 73
End: 2023-06-28
Payer: MEDICARE

## 2023-06-28 DIAGNOSIS — I50.22 CHRONIC SYSTOLIC (CONGESTIVE) HEART FAILURE (HCC): ICD-10-CM

## 2023-06-28 PROCEDURE — 93798 PHYS/QHP OP CAR RHAB W/ECG: CPT

## 2023-06-29 ENCOUNTER — CLINICAL SUPPORT (OUTPATIENT)
Dept: CARDIAC REHAB | Age: 73
End: 2023-06-29
Payer: MEDICARE

## 2023-06-29 DIAGNOSIS — N30.01 ACUTE CYSTITIS WITH HEMATURIA: Primary | ICD-10-CM

## 2023-06-29 DIAGNOSIS — I50.22 CHRONIC SYSTOLIC (CONGESTIVE) HEART FAILURE (HCC): Primary | ICD-10-CM

## 2023-06-29 LAB
BACTERIA UR CULT: ABNORMAL
BACTERIA UR CULT: ABNORMAL

## 2023-06-29 PROCEDURE — 93798 PHYS/QHP OP CAR RHAB W/ECG: CPT

## 2023-06-29 RX ORDER — NITROFURANTOIN 25; 75 MG/1; MG/1
100 CAPSULE ORAL 2 TIMES DAILY
Qty: 14 CAPSULE | Refills: 0 | Status: SHIPPED | OUTPATIENT
Start: 2023-06-29 | End: 2023-07-02

## 2023-06-29 NOTE — TELEPHONE ENCOUNTER
Reached out to on call provider to request medication  Sri Odor was sent over to pharmacy  Reached out to patient to advised  Pt is upset that macrobid was sent in, pt yelling that this is what he had before and it did nothing  Did explain to patient due to other issues tht he has not many other options  Pt started screaming that we don't care and he is going to let Dr Lex Alexander know  I did advise patient it would be best to discuss with provider when he comes in for his appt  Advised I was following up to let him know  Pt screaming again and hung up the phone

## 2023-06-29 NOTE — TELEPHONE ENCOUNTER
Call transferred from [facility]  Pt reports that he saw his urine culture and he has infection  Pt is requesting medication be sent over  Pt is very upset and anxious, worried medication won't be sent and he will end up with sepsis  Advised patient that I will forward message right away to provider pool so he can start medication today  Pt very concerned with his OT issue that the right medication is sent over  Pt reassured, advised I will contact him once medication is sent  Pt states his pharmacy closes at 6

## 2023-06-29 NOTE — TELEPHONE ENCOUNTER
Patient called back and demanded I transferred him to a nurse in the office but I told him the office was closed  He started to curse and said some bad words and hung up

## 2023-06-29 NOTE — TELEPHONE ENCOUNTER
[address] called to see if the script has been sent it yet for the patient/  Pt is anxious

## 2023-06-29 NOTE — TELEPHONE ENCOUNTER
Patient called upset requesting to speak with a nurse, but Sinai Michaels to go over his urine culture   Call transferred to [facility]

## 2023-06-30 ENCOUNTER — HOSPITAL ENCOUNTER (INPATIENT)
Facility: HOSPITAL | Age: 73
LOS: 2 days | Discharge: HOME | DRG: 690 | End: 2023-07-02
Attending: EMERGENCY MEDICINE | Admitting: INTERNAL MEDICINE
Payer: MEDICARE

## 2023-06-30 DIAGNOSIS — N39.0 COMPLICATED UTI (URINARY TRACT INFECTION): Primary | ICD-10-CM

## 2023-06-30 DIAGNOSIS — K59.00 CONSTIPATION: ICD-10-CM

## 2023-06-30 DIAGNOSIS — Z78.9 FAILURE OF OUTPATIENT TREATMENT: ICD-10-CM

## 2023-06-30 LAB
ALBUMIN SERPL BCP-MCNC: 4.7 G/DL (ref 3.5–5)
ALP SERPL-CCNC: 89 U/L (ref 34–104)
ALT SERPL W P-5'-P-CCNC: 33 U/L (ref 7–52)
ANION GAP SERPL CALCULATED.3IONS-SCNC: 7 MMOL/L
AST SERPL W P-5'-P-CCNC: 27 U/L (ref 13–39)
BASOPHILS # BLD MANUAL: 0 THOUSAND/UL (ref 0–0.1)
BASOPHILS NFR MAR MANUAL: 0 % (ref 0–1)
BILIRUB SERPL-MCNC: 1.05 MG/DL (ref 0.2–1)
BUN SERPL-MCNC: 15 MG/DL (ref 5–25)
CALCIUM SERPL-MCNC: 9.2 MG/DL (ref 8.4–10.2)
CHLORIDE SERPL-SCNC: 100 MMOL/L (ref 96–108)
CO2 SERPL-SCNC: 25 MMOL/L (ref 21–32)
CREAT SERPL-MCNC: 0.84 MG/DL (ref 0.6–1.3)
EOSINOPHIL # BLD MANUAL: 0 THOUSAND/UL (ref 0–0.4)
EOSINOPHIL NFR BLD MANUAL: 0 % (ref 0–6)
ERYTHROCYTE [DISTWIDTH] IN BLOOD BY AUTOMATED COUNT: 14.3 % (ref 11.6–15.1)
GFR SERPL CREATININE-BSD FRML MDRD: 88 ML/MIN/1.73SQ M
GLUCOSE SERPL-MCNC: 111 MG/DL (ref 65–140)
HCT VFR BLD AUTO: 34.7 % (ref 36.5–49.3)
HGB BLD-MCNC: 11.8 G/DL (ref 12–17)
LIPASE SERPL-CCNC: 46 U/L (ref 11–82)
LYMPHOCYTES # BLD AUTO: 27.43 THOUSAND/UL (ref 0.6–4.47)
LYMPHOCYTES # BLD AUTO: 82 % (ref 14–44)
MCH RBC QN AUTO: 33.4 PG (ref 26.8–34.3)
MCHC RBC AUTO-ENTMCNC: 34 G/DL (ref 31.4–37.4)
MCV RBC AUTO: 98 FL (ref 82–98)
MONOCYTES # BLD AUTO: 0.67 THOUSAND/UL (ref 0–1.22)
MONOCYTES NFR BLD: 2 % (ref 4–12)
NEUTROPHILS # BLD MANUAL: 5.35 THOUSAND/UL (ref 1.85–7.62)
NEUTS SEG NFR BLD AUTO: 16 % (ref 43–75)
PLATELET # BLD AUTO: 177 THOUSANDS/UL (ref 149–390)
PLATELET BLD QL SMEAR: ADEQUATE
PMV BLD AUTO: 9.6 FL (ref 8.9–12.7)
POTASSIUM SERPL-SCNC: 5 MMOL/L (ref 3.5–5.3)
PROT SERPL-MCNC: 7.9 G/DL (ref 6.4–8.4)
RBC # BLD AUTO: 3.53 MILLION/UL (ref 3.88–5.62)
RBC MORPH BLD: NORMAL
SODIUM SERPL-SCNC: 132 MMOL/L (ref 135–147)
WBC # BLD AUTO: 33.45 THOUSAND/UL (ref 4.31–10.16)

## 2023-06-30 PROCEDURE — 83690 ASSAY OF LIPASE: CPT | Performed by: EMERGENCY MEDICINE

## 2023-06-30 PROCEDURE — 85027 COMPLETE CBC AUTOMATED: CPT | Performed by: EMERGENCY MEDICINE

## 2023-06-30 PROCEDURE — 99222 1ST HOSP IP/OBS MODERATE 55: CPT | Performed by: UROLOGY

## 2023-06-30 PROCEDURE — 96374 THER/PROPH/DIAG INJ IV PUSH: CPT

## 2023-06-30 PROCEDURE — 96375 TX/PRO/DX INJ NEW DRUG ADDON: CPT

## 2023-06-30 PROCEDURE — 80053 COMPREHEN METABOLIC PANEL: CPT | Performed by: EMERGENCY MEDICINE

## 2023-06-30 PROCEDURE — 99283 EMERGENCY DEPT VISIT LOW MDM: CPT

## 2023-06-30 PROCEDURE — 36415 COLL VENOUS BLD VENIPUNCTURE: CPT | Performed by: EMERGENCY MEDICINE

## 2023-06-30 PROCEDURE — 99285 EMERGENCY DEPT VISIT HI MDM: CPT | Performed by: EMERGENCY MEDICINE

## 2023-06-30 PROCEDURE — 99223 1ST HOSP IP/OBS HIGH 75: CPT | Performed by: INTERNAL MEDICINE

## 2023-06-30 PROCEDURE — 87040 BLOOD CULTURE FOR BACTERIA: CPT | Performed by: EMERGENCY MEDICINE

## 2023-06-30 PROCEDURE — 85007 BL SMEAR W/DIFF WBC COUNT: CPT | Performed by: EMERGENCY MEDICINE

## 2023-06-30 RX ORDER — SODIUM CHLORIDE 9 MG/ML
75 INJECTION, SOLUTION INTRAVENOUS CONTINUOUS
Status: DISPENSED | OUTPATIENT
Start: 2023-06-30 | End: 2023-07-01

## 2023-06-30 RX ORDER — METOCLOPRAMIDE HYDROCHLORIDE 5 MG/ML
10 INJECTION INTRAMUSCULAR; INTRAVENOUS ONCE
Status: COMPLETED | OUTPATIENT
Start: 2023-06-30 | End: 2023-06-30

## 2023-06-30 RX ORDER — ONDANSETRON 2 MG/ML
4 INJECTION INTRAMUSCULAR; INTRAVENOUS EVERY 4 HOURS PRN
Status: DISCONTINUED | OUTPATIENT
Start: 2023-06-30 | End: 2023-07-02 | Stop reason: HOSPADM

## 2023-06-30 RX ORDER — CHLORAL HYDRATE 500 MG
1000 CAPSULE ORAL DAILY
Status: DISCONTINUED | OUTPATIENT
Start: 2023-06-30 | End: 2023-07-02 | Stop reason: HOSPADM

## 2023-06-30 RX ORDER — MULTIVIT WITH MINERALS/LUTEIN
250 TABLET ORAL 2 TIMES DAILY
Status: DISCONTINUED | OUTPATIENT
Start: 2023-06-30 | End: 2023-07-02 | Stop reason: HOSPADM

## 2023-06-30 RX ORDER — ZOLPIDEM TARTRATE 5 MG/1
5 TABLET ORAL
Status: DISCONTINUED | OUTPATIENT
Start: 2023-06-30 | End: 2023-07-02 | Stop reason: HOSPADM

## 2023-06-30 RX ORDER — ACETAMINOPHEN 325 MG/1
650 TABLET ORAL EVERY 6 HOURS PRN
Status: DISCONTINUED | OUTPATIENT
Start: 2023-06-30 | End: 2023-06-30

## 2023-06-30 RX ORDER — LORAZEPAM 0.5 MG/1
0.5 TABLET ORAL EVERY 8 HOURS PRN
Status: DISCONTINUED | OUTPATIENT
Start: 2023-06-30 | End: 2023-07-02 | Stop reason: HOSPADM

## 2023-06-30 RX ORDER — LIDOCAINE 50 MG/G
1 PATCH TOPICAL DAILY
Status: DISCONTINUED | OUTPATIENT
Start: 2023-06-30 | End: 2023-07-02 | Stop reason: HOSPADM

## 2023-06-30 RX ORDER — PANTOPRAZOLE SODIUM 40 MG/1
40 TABLET, DELAYED RELEASE ORAL
Status: DISCONTINUED | OUTPATIENT
Start: 2023-07-01 | End: 2023-07-02 | Stop reason: HOSPADM

## 2023-06-30 RX ORDER — CEFTRIAXONE 1 G/50ML
1000 INJECTION, SOLUTION INTRAVENOUS EVERY 24 HOURS
Status: DISCONTINUED | OUTPATIENT
Start: 2023-07-01 | End: 2023-07-02 | Stop reason: HOSPADM

## 2023-06-30 RX ORDER — LIDOCAINE 50 MG/G
1 PATCH TOPICAL DAILY
Status: DISCONTINUED | OUTPATIENT
Start: 2023-07-01 | End: 2023-06-30

## 2023-06-30 RX ORDER — SPIRONOLACTONE 25 MG/1
25 TABLET ORAL DAILY
Status: DISCONTINUED | OUTPATIENT
Start: 2023-07-01 | End: 2023-07-02 | Stop reason: HOSPADM

## 2023-06-30 RX ORDER — NADOLOL 40 MG/1
80 TABLET ORAL
Status: DISCONTINUED | OUTPATIENT
Start: 2023-07-01 | End: 2023-07-02 | Stop reason: HOSPADM

## 2023-06-30 RX ORDER — DABIGATRAN ETEXILATE 150 MG/1
150 CAPSULE ORAL 2 TIMES DAILY
Status: DISCONTINUED | OUTPATIENT
Start: 2023-06-30 | End: 2023-07-02 | Stop reason: HOSPADM

## 2023-06-30 RX ORDER — BUSPIRONE HYDROCHLORIDE 5 MG/1
7.5 TABLET ORAL
Status: DISCONTINUED | OUTPATIENT
Start: 2023-06-30 | End: 2023-07-02 | Stop reason: HOSPADM

## 2023-06-30 RX ORDER — BUSPIRONE HYDROCHLORIDE 5 MG/1
7.5 TABLET ORAL DAILY
Status: DISCONTINUED | OUTPATIENT
Start: 2023-07-01 | End: 2023-06-30

## 2023-06-30 RX ORDER — LOSARTAN POTASSIUM 50 MG/1
50 TABLET ORAL DAILY
Status: DISCONTINUED | OUTPATIENT
Start: 2023-07-01 | End: 2023-07-02 | Stop reason: HOSPADM

## 2023-06-30 RX ORDER — CHLORAL HYDRATE 500 MG
1000 CAPSULE ORAL DAILY
Status: DISCONTINUED | OUTPATIENT
Start: 2023-07-01 | End: 2023-06-30

## 2023-06-30 RX ORDER — ATORVASTATIN CALCIUM 20 MG/1
20 TABLET, FILM COATED ORAL
Status: DISCONTINUED | OUTPATIENT
Start: 2023-06-30 | End: 2023-07-02 | Stop reason: HOSPADM

## 2023-06-30 RX ORDER — SIMETHICONE 20 MG/.3ML
40 EMULSION ORAL EVERY 6 HOURS PRN
Status: DISCONTINUED | OUTPATIENT
Start: 2023-06-30 | End: 2023-07-02 | Stop reason: HOSPADM

## 2023-06-30 RX ORDER — HYDROXYZINE HYDROCHLORIDE 10 MG/1
10 TABLET, FILM COATED ORAL EVERY 6 HOURS PRN
Status: DISCONTINUED | OUTPATIENT
Start: 2023-06-30 | End: 2023-06-30

## 2023-06-30 RX ORDER — BUSPIRONE HYDROCHLORIDE 5 MG/1
7.5 TABLET ORAL DAILY
Status: DISCONTINUED | OUTPATIENT
Start: 2023-07-01 | End: 2023-07-02 | Stop reason: HOSPADM

## 2023-06-30 RX ORDER — ONDANSETRON 2 MG/ML
4 INJECTION INTRAMUSCULAR; INTRAVENOUS ONCE
Status: DISCONTINUED | OUTPATIENT
Start: 2023-06-30 | End: 2023-06-30

## 2023-06-30 RX ORDER — ACETAMINOPHEN 325 MG/1
975 TABLET ORAL EVERY 8 HOURS PRN
Status: DISCONTINUED | OUTPATIENT
Start: 2023-06-30 | End: 2023-07-02 | Stop reason: HOSPADM

## 2023-06-30 RX ORDER — CEFTRIAXONE 1 G/50ML
1000 INJECTION, SOLUTION INTRAVENOUS ONCE
Status: COMPLETED | OUTPATIENT
Start: 2023-06-30 | End: 2023-06-30

## 2023-06-30 RX ORDER — ACETAMINOPHEN 325 MG/1
650 TABLET ORAL ONCE
Status: COMPLETED | OUTPATIENT
Start: 2023-06-30 | End: 2023-06-30

## 2023-06-30 RX ADMIN — CEFTRIAXONE 1000 MG: 1 INJECTION, SOLUTION INTRAVENOUS at 14:17

## 2023-06-30 RX ADMIN — OMEGA-3 FATTY ACIDS CAP 1000 MG 1000 MG: 1000 CAP at 21:01

## 2023-06-30 RX ADMIN — BUSPIRONE HYDROCHLORIDE 7.5 MG: 5 TABLET ORAL at 21:02

## 2023-06-30 RX ADMIN — LIDOCAINE 1 PATCH: 50 PATCH CUTANEOUS at 21:02

## 2023-06-30 RX ADMIN — CYANOCOBALAMIN TAB 500 MCG 500 MCG: 500 TAB at 21:01

## 2023-06-30 RX ADMIN — DABIGATRAN ETEXILATE MESYLATE 150 MG: 150 CAPSULE ORAL at 17:26

## 2023-06-30 RX ADMIN — AMPICILLIN SODIUM 2000 MG: 2 INJECTION, POWDER, FOR SOLUTION INTRAMUSCULAR; INTRAVENOUS at 17:26

## 2023-06-30 RX ADMIN — SIMETHICONE 40 MG: 20 EMULSION ORAL at 18:35

## 2023-06-30 RX ADMIN — METOCLOPRAMIDE 10 MG: 5 INJECTION, SOLUTION INTRAMUSCULAR; INTRAVENOUS at 13:33

## 2023-06-30 RX ADMIN — VANCOMYCIN HYDROCHLORIDE 2000 MG: 1 INJECTION, POWDER, LYOPHILIZED, FOR SOLUTION INTRAVENOUS at 14:57

## 2023-06-30 RX ADMIN — ATORVASTATIN CALCIUM 20 MG: 20 TABLET, FILM COATED ORAL at 17:26

## 2023-06-30 RX ADMIN — Medication 250 MG: at 17:26

## 2023-06-30 RX ADMIN — SODIUM CHLORIDE 75 ML/HR: 0.9 INJECTION, SOLUTION INTRAVENOUS at 16:42

## 2023-06-30 RX ADMIN — LORAZEPAM 0.5 MG: 0.5 TABLET ORAL at 21:01

## 2023-06-30 RX ADMIN — AMPICILLIN SODIUM 2000 MG: 2 INJECTION, POWDER, FOR SOLUTION INTRAMUSCULAR; INTRAVENOUS at 21:01

## 2023-06-30 RX ADMIN — ACETAMINOPHEN 325MG 650 MG: 325 TABLET ORAL at 18:35

## 2023-06-30 RX ADMIN — ZOLPIDEM TARTRATE 5 MG: 5 TABLET, COATED ORAL at 21:01

## 2023-06-30 RX ADMIN — ACETAMINOPHEN 325MG 975 MG: 325 TABLET ORAL at 21:11

## 2023-06-30 RX ADMIN — SODIUM CHLORIDE 500 ML: 0.9 INJECTION, SOLUTION INTRAVENOUS at 14:33

## 2023-06-30 NOTE — ASSESSMENT & PLAN NOTE
· Stable CLL follows with Dr. Medina Jiménez    Results from last 7 days   Lab Units 06/30/23  1322   WBC Thousand/uL 33.45*

## 2023-06-30 NOTE — CONSULTS
UROLOGY CONSULTATION NOTE     Patient Identifiers: Capo Brandon (MRN [id number])  Service Requesting Consultation: [facility]  Service Providing Consultation:  Urology, Don Gallardo MD    Date of Service: 6/30/2023    Reason for Consultation: BPH, urinary retention, recurrent urinary tract infections, status post robot-assisted laparoscopic bladder diverticulectomy    History of Present Illness:     Capo Brandon is a 81 y.o. with a history of TURP x2 secondary to BPH. His first was performed many years ago at [facility] and more recently in [place]. He then went to [facility] where he had a robot-assisted laparoscopic bladder diverticulectomy. He still is unable to urinate. He continues to perform clean intermittent self-catheterization multiple times a day. He now has a pacemaker in place. He has a history of V-fib cardiac arrest.  Recurrent urine cultures continue to show bacteria. His most recent urine culture was positive on June 27, 2023 for E. coli and Enterococcus. The E. coli is penicillin and cefazolin resistant. Temperature is 99. White blood cell count 33. The Enterococcus is susceptible to ampicillin. Urologic consultation is requested.     Past Medical, Past Surgical History:     Past Medical History:   Diagnosis Date   • Anxiety    • BPH (benign prostatic hypertrophy)    • Cancer (HCC)     CLL   • CLL (chronic lymphocytic leukemia) ([address])     2009   • Colon polyp    • Concussion     Resolved: 08/22/16   • Depression    • Diverticulitis 01/13/2020 2014 CT done 11/19 12/19 CT done    • E coli bacteremia 06/27/2021    2/2 blood cultures with Finley Colla appears to be the urine    • Epididymitis 05/19/2021 5/2021   • Gastrointestinal hemorrhage     Last assessed: 08/27/13   • GERD (gastroesophageal reflux disease)    • H/O vitamin D deficiency 07/07/2021   • Hearing loss of aging    • Hiatal hernia    • History of right hip replacement 04/19/2021   • History of transfusion    • Hyperlipidemia    • Hypertension    • Hyponatremia 03/06/2023   • Iron deficiency anemia    • Microscopic hematuria     Last assessed: 06/28/13   • OA (osteoarthritis)     right hip   • Obesity (BMI 30.0-34.9) 04/07/2022   • Pneumothorax    • Primary osteoarthritis of right hip 09/29/2017    He is status post right hip arthroplasty   • Prostatitis    • Pulmonary hypertension (HCC)    • QT prolongation    • Seasonal allergies    • Sepsis ([address]) 03/06/2023   • Unresponsive episode 03/07/2023   • Urinary tract infection associated with catheterization of urinary tract ([address]) 02/05/2021    Pseudomonal UTI asymptomatic. Per Urology do not treat at this time. • Urinary tract infection associated with catheterization of urinary tract ([address]) 02/05/2021    Pseudomonal UTI asymptomatic. Per Urology do not treat at this time. He did have urosepsis from E coli 7/21   • UTI (urinary tract infection)     in past   • Wears glasses      Past Surgical History:   Procedure Laterality Date   • BLADDER SURGERY     • CARDIAC CATHETERIZATION Left 3/7/2023    Procedure: Cardiac Left Heart Cath;  Surgeon: Sulema Boone MD;  Location: [facility] CARDIAC [facility]ATH [facility]; Service: Cardiology   • CARDIAC CATHETERIZATION N/A 3/7/2023    Procedure: Cardiac temporary pacemaker;  Surgeon: Sulema Boone MD;  Location: [facility] CARDIAC [facility]; Service: Cardiology   • CARDIAC ELECTROPHYSIOLOGY PROCEDURE N/A 3/8/2023    Procedure: Cardiac icd implant;  Surgeon: Octaviano Hills MD;  Location: [facility];   Service: Cardiology   • COLONOSCOPY     • CYSTOSCOPY  10/09/2014    Diagnostic   • CYSTOSCOPY  06/29/2020   • FL CYSTOGRAM  08/15/2022   • FRACTURE SURGERY      left lower arm   • FRACTURE SURGERY      left femur   • HERNIA REPAIR     • JOINT REPLACEMENT Right     hip   • OTHER SURGICAL HISTORY  03/2011    Spinal anesthesia epidural   • NV ARTHRP ACETBLR/PROX FEM PROSTC AGRFT/ALGRFT Right 09/29/2017    Procedure: ARTHROPLASTY HIP TOTAL ANTERIOR;  Surgeon: Checo Elaine MD;  Location: [facility];  Service: Orthopedics   • TONSILLECTOMY AND ADENOIDECTOMY     • TRANSURETHRAL RESECTION OF PROSTATE      x 2   • WRIST SURGERY        Medications, Allergies:   Scheduled Meds:  Current Facility-Administered Medications   Medication Dose Route Frequency Provider Last Rate   • acetaminophen  650 mg Oral Q6H PRN Appling Fitting, DO     • acetaminophen  650 mg Oral Once Celestino Maurilio, DO     • ampicillin  2,000 mg Intravenous Q4H Celestino Maurilio, DO 2,000 mg (06/30/23 1726)   • ascorbic acid  250 mg Oral BID Tamir Fitting, DO     • atorvastatin  20 mg Oral Daily With [employer], DO     • [START ON 7/1/2023] busPIRone  7.5 mg Oral Daily Appling Fitting, DO      And   • busPIRone  7.5 mg Oral HS Celestino Maurilio, DO     • [START ON 7/1/2023] cefTRIAXone  1,000 mg Intravenous Q24H Appling Fitting, DO     • vitamin B-12  500 mcg Oral Daily Appling Fitting, DO     • dabigatran etexilate  150 mg Oral BID Appling Fitting, DO     • fish oil  1,000 mg Oral Daily Celestino Maurilio, DO     • LORazepam  0.5 mg Oral Q8H PRN Appling Fitting, DO     • [START ON 7/1/2023] losartan  50 mg Oral Daily Celestino Maurilio, DO     • [START ON 7/1/2023] nadolol  80 mg Oral Early Morning Celestino Maurilio, DO     • ondansetron  4 mg Intravenous Q4H PRN Appling Fitting, DO     • [START ON 7/1/2023] pantoprazole  40 mg Oral Early Morning Celestino Maurilio, DO     • simethicone  40 mg Oral Q6H PRN Appling Fitting, DO     • sodium chloride  75 mL/hr Intravenous Continuous Celestino Maurilio, DO 75 mL/hr (06/30/23 1642)   • [START ON 7/1/2023] spironolactone  25 mg Oral Daily Celestino Maurilio, DO     • zolpidem  5 mg Oral HS PRN Appling Fitting, DO       Continuous Infusions:sodium chloride, 75 mL/hr, Last Rate: 75 mL/hr (06/30/23 1642)      PRN Meds:.•  acetaminophen  •  LORazepam  •  ondansetron  •  simethicone  •  zolpidem    Allergies:   Allergies   Allergen Reactions   • Ciprofloxacin Other (See Comments)     LONG QT syndrome   • Codeine Other (See Comments)     agitated   • Sulfamethoxazole-Trimethoprim GI Intolerance and Abdominal Pain     upset stomach   :    Social and Family History:   Social History:   Social History     Tobacco Use   • Smoking status: Former     Packs/day: 1.00     Years: 15.00     Total pack years: 15.00     Types: Cigarettes     Start date:      Quit date: 1980     Years since quittin.8   • Smokeless tobacco: Never   Vaping Use   • Vaping Use: Never used   Substance Use Topics   • Alcohol use: Not Currently     Alcohol/week: 10.0 standard drinks of alcohol     Types: 10 Cans of beer per week     Comment: no more drinking   • Drug use: Not Currently     Types: Marijuana     Comment: "medical marijuana"   . Social History     Tobacco Use   Smoking Status Former   • Packs/day: 1.00   • Years: 15.   • Total pack years: 15.00   • Types: Cigarettes   • Start date:    • Quit date: 1980   • Years since quittin.8   Smokeless Tobacco Never       Family History:  Family History   Problem Relation Age of Onset   • No Known Problems Mother    • Heart attack Father    • Diabetes Brother    • Prostate cancer Brother    :     Review of Systems:   He reports lower urinary tract symptoms and low back pain. He denies any chest pain or shortness of breath  All other systems queried were negative. Physical Exam:     Vitals:    23 1629   BP: 135/67   Pulse: 70   Resp: 20   Temp: 98.5 °F (36.9 °C)   SpO2: 97%       PE: On examination he is in no acute distress.   He is non-ill and nontoxic appearing  HEENT normocephalic atraumatic, sclera anicteric  Abdomen soft nontender nondistended  Respiratory no distress  Skin warm  Extremities without edema  Neurologic grossly intact and nonfocal    Labs:     Lab Results   Component Value Date    HGB 11.8 (L) 2023    HCT 34.7 (L) 2023    WBC 33.45 (HH) 2023     2023   ]    Lab Results   Component Value Date    NA 135 (L) 07/20/2015    K 5.0 06/30/2023     06/30/2023    CO2 25 06/30/2023    BUN 15 06/30/2023    CREATININE 0.84 06/30/2023    CALCIUM 9.2 06/30/2023    GLUCOSE 130 01/10/2021   ]    Imaging: There is no relevant urologic imaging on this hospitalization    ASSESSMENT:     81 y.o. old male with  history of BPH, status post TURP, status post robot-assisted laparoscopic bladder diverticulectomy, recurrent urinary tract infection  PLAN:     Continue ampicillin and ceftriaxone per the medical team.  These are appropriate antibiotics based on cultures. Continue to follow cultures. The patient is able to perform self clean intermittent catheterization with his own catheters. If the patient's white blood cell count and fever curve do not improve I would obtain a noncontrast CT scan of the abdomen and pelvis to ensure no evidence of calculi or hydronephrosis. Patient has outpatient follow-up arranged with me in the near future. Thank you for allowing me to participate in this patients’ care. Please do not hesitate to call with any additional questions.   Lorraine Andrade MD

## 2023-06-30 NOTE — TELEPHONE ENCOUNTER
Based on final sensitivity, Macrobid appropriately prescribed  Patient noted to have 2 organisms on urine culture  Due to documented allergy to Cipro, Levaquin not prescribed at this time

## 2023-06-30 NOTE — H&P
[address] [facility]  H&P  Name: Vidya Littlejohn 81 y.o. male I MRN: [id number]  Unit/Bed#: ED-15 I Date of Admission: 6/30/2023   Date of Service: 6/30/2023 I Hospital Day: 0      Assessment/Plan   * Complicated UTI (urinary tract infection)  Assessment & Plan  Background: History of TURP x2, CLL, P A-fib with NSVT s/p ICD with neurogenic bladder who presents with complicated UTI  · Treated with nitrofurantoin as an outpatient. Subsequently repeat cultures still demonstrates e. coli and enterococcus  · Remains symptomatic will follow-up on blood cultures. · Has CLL which may blunt the patient's immune response  · Continue ceftriaxone for e. coli but add ampicillin for enterococcus  · Follows with urology as an outpatient. Will consult    Anxiety and depression  Assessment & Plan  · Stable continue buspirone    Paroxysmal A-fib (HCC)  Assessment & Plan  · Paroxysmal atrial fibrillation with prolonged QT syndrome on nadolol  · Follows with Dr. Clive Avila and Dr. Vega Husbands  · Anticoagulation: Continue pradaxa  · ICD in place  · Avoiding QTc prolonging agents    BPH with obstruction/lower urinary tract symptoms  Assessment & Plan  · BPH with history of TURP x2 subsequent diverticulectomy  · Straight catheterizes several times daily. Patient does with sterile technique    CLL (chronic lymphocytic leukemia) ([address])  Assessment & Plan  · Stable CLL follows with Dr. ATKINS [facility]    Results from last 7 days   Lab Units 06/30/23  1322   WBC Thousand/uL 33.45*       Essential hypertension  Assessment & Plan  · Continue nadolol and losartan and spironolactone    VTE Pharmacologic Prophylaxis: VTE Score: 3 Moderate Risk (Score 3-4) - Pharmacological DVT Prophylaxis Ordered: dabigatran (Pradaxa). Code Status: Level 1 - Full Code  Discussion with family: Updated  (wife) at bedside.     Anticipated Length of Stay: Patient will be admitted on an inpatient basis with an anticipated length of stay of greater than 2 midnights secondary to complicated UTI. Total Time Spent on Date of Encounter in care of patient: This time was spent on one or more of the following: performing physical exam; counseling and coordination of care; obtaining or reviewing history; documenting in the medical record; reviewing/ordering tests, medications or procedures; communicating with other healthcare professionals and discussing with patient's family/caregivers. Chief Complaint:     Abnormal Lab (Pt placed on antibiotics about 1 week ago for UTI. Finished course and had another urine culture done and still shows UTI. Pt c/o nausea and chills)    History of Present Illness:    Marisa Howard is a 81 y.o. male with a past medical history of TURP x2 neurogenic bladder NSVT, prolonged QT syndrome, paroxysmal atrial fibrillation, and CLL who presents with worsening symptoms of UTI. As an outpatient he was treated with nitrofurantoin for UTI. He had subsequent cultures due to persistent symptoms which demonstrated persistent growth of E. coli/enterococcus. He was placed on another course of nitrofurantoin again. He came here to the emergency department due to failure of antibiotics he has been requested admission. He straight catheterizes himself several times daily with sterile technique. He has had subjective fevers and chills. No vomiting or diarrhea. Review of Systems:  Review of Systems   Constitutional: Positive for chills, fatigue and fever. HENT: Negative for facial swelling and trouble swallowing. Eyes: Negative for visual disturbance. Respiratory: Negative for shortness of breath. Cardiovascular: Negative for chest pain and palpitations. Gastrointestinal: Negative for abdominal distention, abdominal pain, diarrhea, nausea and vomiting. Genitourinary: Positive for dysuria. Negative for hematuria and urgency. Musculoskeletal: Negative for myalgias. Skin: Negative for rash.    Neurological: Negative for dizziness, seizures and speech difficulty. Psychiatric/Behavioral: The patient is not nervous/anxious. All other systems reviewed and are negative. Past Medical and Surgical History:   Past Medical History:   Diagnosis Date   • Anxiety    • BPH (benign prostatic hypertrophy)    • Cancer (HCC)     CLL   • CLL (chronic lymphocytic leukemia) ([address])     2009   • Colon polyp    • Concussion     Resolved: 08/22/16   • Depression    • Diverticulitis 01/13/2020 2014 CT done 11/19 12/19 CT done    • E coli bacteremia 06/27/2021 2/2 blood cultures with Andrew Olmedo appears to be the urine    • Epididymitis 05/19/2021 5/2021   • Gastrointestinal hemorrhage     Last assessed: 08/27/13   • GERD (gastroesophageal reflux disease)    • H/O vitamin D deficiency 07/07/2021   • Hearing loss of aging    • Hiatal hernia    • History of right hip replacement 04/19/2021   • History of transfusion    • Hyperlipidemia    • Hypertension    • Hyponatremia 03/06/2023   • Iron deficiency anemia    • Microscopic hematuria     Last assessed: 06/28/13   • OA (osteoarthritis)     right hip   • Obesity (BMI 30.0-34.9) 04/07/2022   • Pneumothorax    • Primary osteoarthritis of right hip 09/29/2017    He is status post right hip arthroplasty   • Prostatitis    • Pulmonary hypertension ([address])    • QT prolongation    • Seasonal allergies    • Sepsis ([address]) 03/06/2023   • Unresponsive episode 03/07/2023   • Urinary tract infection associated with catheterization of urinary tract ([address]) 02/05/2021    Pseudomonal UTI asymptomatic. Per Urology do not treat at this time. • Urinary tract infection associated with catheterization of urinary tract ([address]) 02/05/2021    Pseudomonal UTI asymptomatic. Per Urology do not treat at this time.   He did have urosepsis from E coli 7/21   • UTI (urinary tract infection)     in past   • Wears glasses      Past Surgical History:   Procedure Laterality Date   • BLADDER SURGERY     • CARDIAC CATHETERIZATION Left 3/7/2023    Procedure: Cardiac Left Heart Cath;  Surgeon: Darek Bowman MD;  Location: [facility]; Service: Cardiology   • CARDIAC CATHETERIZATION N/A 3/7/2023    Procedure: Cardiac temporary pacemaker;  Surgeon: Darek Bowman MD;  Location: [facility]; Service: Cardiology   • CARDIAC ELECTROPHYSIOLOGY PROCEDURE N/A 3/8/2023    Procedure: Cardiac icd implant;  Surgeon: Chilo Nicole MD;  Location: [facility]; Service: Cardiology   • COLONOSCOPY     • CYSTOSCOPY  10/09/2014    Diagnostic   • CYSTOSCOPY  06/29/2020   • FL CYSTOGRAM  08/15/2022   • FRACTURE SURGERY      left lower arm   • FRACTURE SURGERY      left femur   • HERNIA REPAIR     • JOINT REPLACEMENT Right     hip   • OTHER SURGICAL HISTORY  03/2011    Spinal anesthesia epidural   • [place] ARTHRP ACETBLR/PROX FEM PROSTC AGRFT/ALGRFT Right 09/29/2017    Procedure: ARTHROPLASTY HIP TOTAL ANTERIOR;  Surgeon: Swati Alvarado MD;  Location: [facility];  Service: Orthopedics   • TONSILLECTOMY AND ADENOIDECTOMY     • TRANSURETHRAL RESECTION OF PROSTATE      x 2   • WRIST SURGERY       Meds/Allergies: Allergies: Allergies   Allergen Reactions   • Ciprofloxacin Other (See Comments)     LONG QT syndrome   • Codeine Other (See Comments)     agitated   • Sulfamethoxazole-Trimethoprim GI Intolerance and Abdominal Pain     upset stomach     Prior to Admission Medications   Prescriptions Last Dose Informant Patient Reported? Taking?    Acetaminophen 500 MG  Self Yes No   Sig: Take 1,000 mg by mouth prn   Blood Pressure Monitoring (Omron 5 Series BP Monitor) PEE  Self Yes No   LORazepam (Ativan) 0.5 mg tablet  Self No No   Sig: Take 1 tablet (0.5 mg total) by mouth every 8 (eight) hours as needed for anxiety   Lactobacillus (PROBIOTIC ACIDOPHILUS PO)   Yes No   Sig: Take by mouth   Omega-3 Fatty Acids (FISH OIL) 1,000 mg  Self Yes No   Sig: Take by mouth daily   ascorbic acid (VITAMIN C) 250 MG CHEW  Self Yes No   Sig: Chew 250 mg 2 (two) times a day   atorvastatin (LIPITOR) 20 mg tablet  Self No No   Sig: TAKE ONE TABLET BY MOUTH ONCE EVERY DAY   busPIRone (BUSPAR) 7.5 mg tablet   No No   Si tablet in the morning and 2 tablets at night   dabigatran etexilate (PRADAXA) 150 mg capsu  Self No No   Sig: Take 1 capsule (150 mg total) by mouth 2 (two) times a day   losartan (COZAAR) 50 mg tablet  Self No No   Sig: Take 1 tablet (50 mg total) by mouth daily   montelukast (SINGULAIR) 10 mg tablet  Self No No   Sig: TAKE ONE TABLET BY MOUTH DAILY   nadolol (CORGARD) 80 MG tablet  Self No No   Sig: Take 1 tablet (80 mg total) by mouth daily in the early morning   nitrofurantoin (MACROBID) 100 mg capsule   No No   Sig: Take 1 capsule (100 mg total) by mouth 2 (two) times a day for 7 days   omeprazole (PriLOSEC) 40 MG capsule  Self No No   Sig: Take one capsule by mouth once every day. spironolactone (ALDACTONE) 25 mg tablet  Self No No   Sig: Take 1 tablet (25 mg total) by mouth daily   vitamin B-12 (CYANOCOBALAMIN) 500 MCG TABS  Self No No   Sig: Take 1 tablet (500 mcg total) by mouth daily   zolpidem (AMBIEN) 5 mg tablet   No No   Sig: Take 1/2-1 full tablet at bedtime as needed for sleep.   Do not take if drinking any alcohol or taking lorazepam      Facility-Administered Medications: None     Social History:     Social History     Socioeconomic History   • Marital status: /Civil Union     Spouse name: Not on file   • Number of children: Not on file   • Years of education: Not on file   • Highest education level: Not on file   Occupational History   • Not on file   Tobacco Use   • Smoking status: Former     Packs/day: 1.00     Years: 15.00     Total pack years: 15.00     Types: Cigarettes     Start date: 67     Quit date: 1980     Years since quittin.8   • Smokeless tobacco: Never   Vaping Use   • Vaping Use: Never used   Substance and Sexual Activity   • Alcohol use: Not Currently     Alcohol/week: 10.0 standard drinks of alcohol     Types: 10 Cans of beer per week     Comment: no more drinking   • Drug use: Not Currently     Types: Marijuana     Comment: "medical marijuana"   • Sexual activity: Not Currently   Other Topics Concern   • Not on file   Social History Narrative   • Not on file     Social Determinants of Health     Financial Resource Strain: Low Risk  (10/17/2022)    Overall Financial Resource Strain (CARDIA)    • Difficulty of Paying Living Expenses: Not hard at all   Food Insecurity: No Food Insecurity (3/9/2023)    Hunger Vital Sign    • Worried About Running Out of Food in the Last Year: Never true    • Ran Out of Food in the Last Year: Never true   Transportation Needs: No Transportation Needs (3/9/2023)    PRAPARE - Transportation    • Lack of Transportation (Medical): No    • Lack of Transportation (Non-Medical): No   Physical Activity: Not on file   Stress: Not on file   Social Connections: Not on file   Intimate Partner Violence: Not on file   Housing Stability: Low Risk  (3/9/2023)    Housing Stability Vital Sign    • Unable to Pay for Housing in the Last Year: No    • Number of Places Lived in the Last Year: 1    • Unstable Housing in the Last Year: No     Patient Pre-hospital Living Situation: Lives with spouse  Patient Pre-hospital Level of Mobility:   Patient Pre-hospital Diet Restrictions:     Family History:  Family History   Problem Relation Age of Onset   • No Known Problems Mother    • Heart attack Father    • Diabetes Brother    • Prostate cancer Brother      Physical Exam:   Vitals:   Blood Pressure: 122/60 (06/30/23 1531)  Pulse: 70 (06/30/23 1531)  Temperature: 98.6 °F (37 °C) (06/30/23 1531)  Temp Source: Oral (06/30/23 1531)  Respirations: 20 (06/30/23 1531)  SpO2: 97 % (06/30/23 1531)    Physical Exam  Vitals reviewed. Constitutional:       General: He is not in acute distress. Appearance: Normal appearance. HENT:      Head: Atraumatic.       Mouth/Throat:      Mouth: Mucous membranes are moist.   Eyes: Extraocular Movements: Extraocular movements intact. Cardiovascular:      Rate and Rhythm: Regular rhythm. Heart sounds: Murmur heard. Pulmonary:      Effort: Pulmonary effort is normal.      Breath sounds: Decreased breath sounds present. No wheezing. Abdominal:      General: Bowel sounds are normal.      Palpations: Abdomen is soft. Tenderness: There is no abdominal tenderness. There is no rebound. Musculoskeletal:         General: No swelling or tenderness. Cervical back: Normal range of motion. Skin:     General: Skin is warm. Neurological:      General: No focal deficit present. Mental Status: He is alert. Cranial Nerves: No cranial nerve deficit. Psychiatric:         Mood and Affect: Mood normal.       Lab Results: I have personally reviewed pertinent reports.     Results from last 7 days   Lab Units 06/30/23  1322   WBC Thousand/uL 33.45*   HEMOGLOBIN g/dL 11.8*   HEMATOCRIT % 34.7*   PLATELETS Thousands/uL 177   LYMPHO PCT % 82*   MONO PCT % 2*   EOS PCT % 0     Results from last 7 days   Lab Units 06/30/23  1322   SODIUM mmol/L 132*   POTASSIUM mmol/L 5.0   CHLORIDE mmol/L 100   CO2 mmol/L 25   ANION GAP mmol/L 7   BUN mg/dL 15   CREATININE mg/dL 0.84   CALCIUM mg/dL 9.2   ALBUMIN g/dL 4.7   TOTAL BILIRUBIN mg/dL 1.05*   ALK PHOS U/L 89   ALT U/L 33   AST U/L 27   EGFR ml/min/1.73sq m 88   GLUCOSE RANDOM mg/dL 111          Results from last 7 days   Lab Units 06/27/23  0647   COLOR UA  Light Yellow   CLARITY UA  Clear   SPEC GRAV UA  1.015   PH UA  6.0   LEUKOCYTES UA  Large*   NITRITE UA  Positive*   GLUCOSE UA mg/dl Negative   KETONES UA mg/dl Negative   BILIRUBIN UA  Negative   BLOOD UA  Negative      Results from last 7 days   Lab Units 06/27/23  0647   RBC UA /hpf 2-4*   WBC UA /hpf 10-20*   EPITHELIAL CELLS WET PREP /hpf None Seen   BACTERIA UA /hpf Occasional            Lines/Drains  Invasive Devices     Peripheral Intravenous Line  Duration           Long-Dwell Peripheral IV (Midline) 03/07/23 Left 114 days    Peripheral IV 06/30/23 Left Antecubital <1 day                Imaging:   No results found. EKG, Pathology, and Other Studies Reviewed on Admission:       ** Please Note: This note has been constructed using a voice recognition system.  **

## 2023-06-30 NOTE — ED PROVIDER NOTES
History  Chief Complaint   Patient presents with   • Abnormal Lab     Pt placed on antibiotics about 1 week ago for UTI. Finished course and had another urine culture done and still shows UTI. Pt c/o nausea and chills     74y M here with multiple symptoms. Pt w/ hx of neurogenic bladder status post TURP x2, status post robot-assisted laparoscopic bladder diverticulectomy, persistent neurogenic bladder who self clean caths at home here for evaluation of persistent UTI despite outpt abx along with worsening subjective fever, malaise, n/v, weakness. Completed a course of macrobid for UTI and had outpt urine done to document clearance, however, culture shows persistent infection positive for >100K E. Coli and 50-56K Enterococcus faecalis. Pt reports he was placed on macrobid again for the infection but came in today due to concerns for developing sepsis (hx of sepsis from COVID about a year ago). Pt reports subjective fever, chills, no sweats, +generalized malaise, +nausea, +dry heaves/vomting, no diarrhea. C/o diffuse low back pain w/o sig radiation of pain. Overall doesn't feel well and is very afraid of getting worse at home. Pt also doesn't understand why he was put on the same antibiotic that didn't work the first time         History provided by:  Patient and medical records   used: No        Prior to Admission Medications   Prescriptions Last Dose Informant Patient Reported? Taking?    Acetaminophen 500 MG Not Taking Self Yes No   Sig: Take 1,000 mg by mouth prn   Patient not taking: Reported on 6/30/2023   Blood Pressure Monitoring (Omron 5 Series BP Monitor) PEE  Self Yes No   LORazepam (Ativan) 0.5 mg tablet Not Taking Self No No   Sig: Take 1 tablet (0.5 mg total) by mouth every 8 (eight) hours as needed for anxiety   Patient not taking: Reported on 6/30/2023   Lactobacillus (PROBIOTIC ACIDOPHILUS PO) 6/29/2023  Yes Yes   Sig: Take by mouth   Omega-3 Fatty Acids (FISH OIL) 1,000 mg 2023 at 2100 Self Yes Yes   Sig: Take by mouth daily   ascorbic acid (VITAMIN C) 250 MG CHEW 2023 Self Yes Yes   Sig: Chew 250 mg 2 (two) times a day   atorvastatin (LIPITOR) 20 mg tablet 2023 Self No Yes   Sig: TAKE ONE TABLET BY MOUTH ONCE EVERY DAY   busPIRone (BUSPAR) 7.5 mg tablet 2023  No Yes   Si tablet in the morning and 2 tablets at night   dabigatran etexilate (PRADAXA) 150 mg capsu 2023 Self No Yes   Sig: Take 1 capsule (150 mg total) by mouth 2 (two) times a day   losartan (COZAAR) 50 mg tablet 2023 Self No Yes   Sig: Take 1 tablet (50 mg total) by mouth daily   montelukast (SINGULAIR) 10 mg tablet Not Taking Self No No   Sig: TAKE ONE TABLET BY MOUTH DAILY   Patient not taking: Reported on 2023   nadolol (CORGARD) 80 MG tablet Not Taking Self No No   Sig: Take 1 tablet (80 mg total) by mouth daily in the early morning   Patient not taking: Reported on 2023   nitrofurantoin (MACROBID) 100 mg capsule   No No   Sig: Take 1 capsule (100 mg total) by mouth 2 (two) times a day for 7 days   omeprazole (PriLOSEC) 40 MG capsule 2023 Self No Yes   Sig: Take one capsule by mouth once every day. spironolactone (ALDACTONE) 25 mg tablet 2023 Self No Yes   Sig: Take 1 tablet (25 mg total) by mouth daily   vitamin B-12 (CYANOCOBALAMIN) 500 MCG TABS  Self No No   Sig: Take 1 tablet (500 mcg total) by mouth daily   zolpidem (AMBIEN) 5 mg tablet Past Week  No Yes   Sig: Take 1/2-1 full tablet at bedtime as needed for sleep.   Do not take if drinking any alcohol or taking lorazepam      Facility-Administered Medications: None       Past Medical History:   Diagnosis Date   • Anxiety    • BPH (benign prostatic hypertrophy)    • Cancer (HCC)     CLL   • CLL (chronic lymphocytic leukemia) ([address])        • Colon polyp    • Concussion     Resolved: 16   • Depression    • Diverticulitis 2020 CT done  CT done    • E coli bacteremia 06/27/2021    2/2 blood cultures with [address] appears to be the urine    • Epididymitis 05/19/2021 5/2021   • Gastrointestinal hemorrhage     Last assessed: 08/27/13   • GERD (gastroesophageal reflux disease)    • H/O vitamin D deficiency 07/07/2021   • Hearing loss of aging    • Hiatal hernia    • History of right hip replacement 04/19/2021   • History of transfusion    • Hyperlipidemia    • Hypertension    • Hyponatremia 03/06/2023   • Iron deficiency anemia    • Microscopic hematuria     Last assessed: 06/28/13   • OA (osteoarthritis)     right hip   • Obesity (BMI 30.0-34.9) 04/07/2022   • Pneumothorax    • Primary osteoarthritis of right hip 09/29/2017    He is status post right hip arthroplasty   • Prostatitis    • Pulmonary hypertension (HCC)    • QT prolongation    • Seasonal allergies    • Sepsis ([address]) 03/06/2023   • Unresponsive episode 03/07/2023   • Urinary tract infection associated with catheterization of urinary tract ([address]) 02/05/2021    Pseudomonal UTI asymptomatic. Per Urology do not treat at this time. • Urinary tract infection associated with catheterization of urinary tract ([address]) 02/05/2021    Pseudomonal UTI asymptomatic. Per Urology do not treat at this time. He did have urosepsis from E coli 7/21   • UTI (urinary tract infection)     in past   • Wears glasses        Past Surgical History:   Procedure Laterality Date   • BLADDER SURGERY     • CARDIAC CATHETERIZATION Left 3/7/2023    Procedure: Cardiac Left Heart Cath;  Surgeon: Kerri Wilder MD;  Location: [facility]; Service: Cardiology   • CARDIAC CATHETERIZATION N/A 3/7/2023    Procedure: Cardiac temporary pacemaker;  Surgeon: Kerri Wilder MD;  Location: [facility]; Service: Cardiology   • CARDIAC ELECTROPHYSIOLOGY PROCEDURE N/A 3/8/2023    Procedure: Cardiac icd implant;  Surgeon: Morenita Rodney MD;  [facility]: [facility];   Service: Cardiology   • COLONOSCOPY     • CYSTOSCOPY  10/09/2014    Diagnostic • CYSTOSCOPY  2020   • FL CYSTOGRAM  08/15/2022   • FRACTURE SURGERY      left lower arm   • FRACTURE SURGERY      left femur   • HERNIA REPAIR     • JOINT REPLACEMENT Right     hip   • OTHER SURGICAL HISTORY  2011    Spinal anesthesia epidural   • RI ARTHRP ACETBLR/PROX FEM PROSTC AGRFT/ALGRFT Right 2017    Procedure: ARTHROPLASTY HIP TOTAL ANTERIOR;  Surgeon: Saritha Sky MD;  Location: [facility];  Service: Orthopedics   • TONSILLECTOMY AND ADENOIDECTOMY     • TRANSURETHRAL RESECTION OF PROSTATE      x 2   • WRIST SURGERY         Family History   Problem Relation Age of Onset   • No Known Problems Mother    • Heart attack Father    • Diabetes Brother    • Prostate cancer Brother      I have reviewed and agree with the history as documented. E-Cigarette/Vaping   • E-Cigarette Use Never User      E-Cigarette/Vaping Substances   • Nicotine No    • THC No    • CBD No    • Flavoring No    • Other No    • Unknown No      Social History     Tobacco Use   • Smoking status: Former     Packs/day: 1.00     Years: 15.00     Total pack years: 15.00     Types: Cigarettes     Start date:      Quit date: 1980     Years since quittin.8   • Smokeless tobacco: Never   Vaping Use   • Vaping Use: Never used   Substance Use Topics   • Alcohol use: Not Currently     Alcohol/week: 10.0 standard drinks of alcohol     Types: 10 Cans of beer per week     Comment: no more drinking   • Drug use: Not Currently     Types: Marijuana     Comment: "medical marijuana"       Review of Systems   All other systems reviewed and are negative. Physical Exam  Physical Exam  Vitals and nursing note reviewed. Constitutional:       General: He is not in acute distress. Appearance: Normal appearance. He is not ill-appearing, toxic-appearing or diaphoretic.    HENT:      Mouth/Throat:      Mouth: Mucous membranes are moist.   Eyes:      Conjunctiva/sclera: Conjunctivae normal.   Cardiovascular:      Rate and Rhythm: Normal rate. Pulmonary:      Effort: Pulmonary effort is normal.   Abdominal:      General: Bowel sounds are normal. There is no distension. Palpations: Abdomen is soft. Tenderness: There is no abdominal tenderness. There is no right CVA tenderness or left CVA tenderness. Musculoskeletal:         General: No swelling. Cervical back: Normal range of motion. Skin:     General: Skin is warm. Capillary Refill: Capillary refill takes less than 2 seconds. Neurological:      General: No focal deficit present. Mental Status: He is alert. Psychiatric:         Mood and Affect: Mood is anxious.          Vital Signs  ED Triage Vitals   Temperature Pulse Respirations Blood Pressure SpO2   06/30/23 1258 06/30/23 1258 06/30/23 1258 06/30/23 1258 06/30/23 1258   99.1 °F (37.3 °C) 72 17 123/60 96 %      Temp Source Heart Rate Source Patient Position - Orthostatic VS BP Location FiO2 (%)   06/30/23 1258 06/30/23 1258 06/30/23 1531 06/30/23 1258 --   Oral Monitor Lying Right arm       Pain Score       06/30/23 1654       No Pain           Vitals:    06/30/23 1629 06/30/23 2236 07/01/23 0534 07/01/23 0827   BP: 135/67 109/63 108/64 130/72   Pulse: 70 71 71 71   Patient Position - Orthostatic VS:    Lying         Visual Acuity      ED Medications  Medications   ascorbic acid (VITAMIN C) tablet 250 mg (250 mg Oral Given 7/1/23 0832)   dabigatran etexilate (PRADAXA) capsule 150 mg (150 mg Oral Given 7/1/23 0832)   LORazepam (ATIVAN) tablet 0.5 mg (0.5 mg Oral Given 6/30/23 2101)   losartan (COZAAR) tablet 50 mg (50 mg Oral Given 7/1/23 0832)   nadolol (CORGARD) tablet 80 mg (80 mg Oral Not Given 7/1/23 0534)   pantoprazole (PROTONIX) EC tablet 40 mg (40 mg Oral Given 7/1/23 0534)   zolpidem (AMBIEN) tablet 5 mg (5 mg Oral Given 6/30/23 2101)   atorvastatin (LIPITOR) tablet 20 mg (20 mg Oral Given 6/30/23 1726)   spironolactone (ALDACTONE) tablet 25 mg (25 mg Oral Given 7/1/23 8861)   sodium chloride 0.9 % infusion (0 mL/hr Intravenous Stopped 7/1/23 0739)   cefTRIAXone (ROCEPHIN) IVPB (premix in dextrose) 1,000 mg 50 mL (has no administration in time range)   ampicillin (OMNIPEN) 2,000 mg in sodium chloride 0.9 % 100 mL IVPB (2,000 mg Intravenous New Bag 7/1/23 1259)   ondansetron (ZOFRAN) injection 4 mg (has no administration in time range)   cyanocobalamin (VITAMIN B-12) tablet 500 mcg (500 mcg Oral Given 7/1/23 0832)   fish oil capsule 1,000 mg (1,000 mg Oral Given 7/1/23 0832)   busPIRone (BUSPAR) tablet 7.5 mg (7.5 mg Oral Given 7/1/23 0832)     And   busPIRone (BUSPAR) tablet 7.5 mg (7.5 mg Oral Given 6/30/23 2102)   simethicone (MYLICON) oral suspension 40 mg (40 mg Oral Given 7/1/23 0205)   acetaminophen (TYLENOL) tablet 975 mg (975 mg Oral Given 6/30/23 2111)   lidocaine (LIDODERM) 5 % patch 1 patch (1 patch Topical Patch Removed 7/1/23 0832)   docusate sodium (COLACE) capsule 100 mg (100 mg Oral Given 7/1/23 1259)   polyethylene glycol (MIRALAX) packet 17 g (17 g Oral Given 7/1/23 1259)   metoclopramide (REGLAN) injection 10 mg (10 mg Intravenous Given 6/30/23 1333)   cefTRIAXone (ROCEPHIN) IVPB (premix in dextrose) 1,000 mg 50 mL (0 mg Intravenous Stopped 6/30/23 1447)   vancomycin (VANCOCIN) 2,000 mg in sodium chloride 0.9 % 500 mL IVPB (2,000 mg Intravenous New Bag 6/30/23 1457)   sodium chloride 0.9 % bolus 500 mL (0 mL Intravenous Stopped 6/30/23 1545)   acetaminophen (TYLENOL) tablet 650 mg (650 mg Oral Given 6/30/23 1835)       Diagnostic Studies  Results Reviewed     Procedure Component Value Units Date/Time    Comprehensive metabolic panel [[id number]]  (Abnormal) Collected: 07/01/23 0541    Lab Status: Final result Specimen: Blood from Arm, Right Updated: 07/01/23 0711     Sodium 132 mmol/L      Potassium 3.8 mmol/L      Chloride 101 mmol/L      CO2 25 mmol/L      ANION GAP 6 mmol/L      BUN 10 mg/dL      Creatinine 0.73 mg/dL      Glucose 108 mg/dL      Calcium 8.1 mg/dL      AST 17 U/L      ALT 24 U/L      Alkaline Phosphatase 66 U/L      Total Protein 6.3 g/dL      Albumin 3.8 g/dL      Total Bilirubin 0.80 mg/dL      eGFR 93 ml/min/1.73sq m     Narrative:      [place] guidelines for Chronic Kidney Disease (CKD):   •  Stage 1 with normal or high GFR (GFR > 90 mL/min/1.73 square meters)  •  Stage 2 Mild CKD (GFR = 60-89 mL/min/1.73 square meters)  •  Stage 3A Moderate CKD (GFR = 45-59 mL/min/1.73 square meters)  •  Stage 3B Moderate CKD (GFR = 30-44 mL/min/1.73 square meters)  •  Stage 4 Severe CKD (GFR = 15-29 mL/min/1.73 square meters)  •  Stage 5 End Stage CKD (GFR <15 mL/min/1.73 square meters)  Note: GFR calculation is accurate only with a steady state creatinine    CBC (With Platelets) [[id number]]  (Abnormal) Collected: 07/01/23 0541    Lab Status: Final result Specimen: Blood from Arm, Right Updated: 07/01/23 0627     WBC 18.72 Thousand/uL      RBC 2.85 Million/uL      Hemoglobin 9.3 g/dL      Hematocrit 29.1 %       fL      MCH 32.6 pg      MCHC 32.0 g/dL      RDW 14.5 %      Platelets 923 Thousands/uL      MPV 10.1 fL     Blood culture #1 [[id number]] Collected: 06/30/23 1322    Lab Status: Preliminary result Specimen: Blood from Hand, Left Updated: 06/30/23 2201     Blood Culture Received in Microbiology Lab. Culture in Progress. Blood culture #2 [[id number]] Collected: 06/30/23 1322    Lab Status: Preliminary result Specimen: Blood from Arm, Left Updated: 06/30/23 2201     Blood Culture Received in Microbiology Lab. Culture in Progress.     Manual Differential(PHLEBS Do Not Order) [[id number]]  (Abnormal) Collected: 06/30/23 1322    Lab Status: Final result Specimen: Blood from Arm, Left Updated: 06/30/23 1553     Segmented % 16 %      Lymphocytes % 82 %      Monocytes % 2 %      Eosinophils, % 0 %      Basophils % 0 %      Absolute Neutrophils 5.35 Thousand/uL      Lymphocytes Absolute 27.43 Thousand/uL      Monocytes Absolute 0.67 Thousand/uL      Eosinophils Absolute 0.00 Thousand/uL      Basophils Absolute 0.00 Thousand/uL      Total Counted --     RBC Morphology Normal     Platelet Estimate Adequate    Comprehensive metabolic panel [[id number]]  (Abnormal) Collected: 06/30/23 1322    Lab Status: Final result Specimen: Blood from Arm, Left Updated: 06/30/23 1403     Sodium 132 mmol/L      Potassium 5.0 mmol/L      Chloride 100 mmol/L      CO2 25 mmol/L      ANION GAP 7 mmol/L      BUN 15 mg/dL      Creatinine 0.84 mg/dL      Glucose 111 mg/dL      Calcium 9.2 mg/dL      AST 27 U/L      ALT 33 U/L      Alkaline Phosphatase 89 U/L      Total Protein 7.9 g/dL      Albumin 4.7 g/dL      Total Bilirubin 1.05 mg/dL      eGFR 88 ml/min/1.73sq m     Narrative:      [place] guidelines for Chronic Kidney Disease (CKD):   •  Stage 1 with normal or high GFR (GFR > 90 mL/min/1.73 square meters)  •  Stage 2 Mild CKD (GFR = 60-89 mL/min/1.73 square meters)  •  Stage 3A Moderate CKD (GFR = 45-59 mL/min/1.73 square meters)  •  Stage 3B Moderate CKD (GFR = 30-44 mL/min/1.73 square meters)  •  Stage 4 Severe CKD (GFR = 15-29 mL/min/1.73 square meters)  •  Stage 5 End Stage CKD (GFR <15 mL/min/1.73 square meters)  Note: GFR calculation is accurate only with a steady state creatinine    Lipase [[id number]]  (Normal) Collected: 06/30/23 1322    Lab Status: Final result Specimen: Blood from Arm, Left Updated: 06/30/23 1403     Lipase 46 u/L     CBC and differential [[id number]]  (Abnormal) Collected: 06/30/23 1322    Lab Status: Final result Specimen: Blood from Arm, Left Updated: 06/30/23 1349     WBC 33.45 Thousand/uL      RBC 3.53 Million/uL      Hemoglobin 11.8 g/dL      Hematocrit 34.7 %      MCV 98 fL      MCH 33.4 pg      MCHC 34.0 g/dL      RDW 14.3 %      MPV 9.6 fL      Platelets 130 Thousands/uL                  No orders to display              Procedures  Procedures         ED Course  ED Course as of 07/01/23 1400 Fri Jun 30, 2023   1350 Will cover urine culture w/ ceftriaxone and vancomycin in the ED. Inpatient team will determine appropriate continued treatment for this complicated UTI w/ failure of outpt abx   1410 Creatinine: 0.84  baseline   1414 Labs reviewed. TT sent to [facility] for admission                                             Medical Decision Making  74y M w/ reported positive urine culture after completing course of abx. Care is complicated due to pt self cath 2/2 neurogenic bladder. Pt is considered a failure of outpt abx at this time. Will ck labs for any evidence of severe metabolic abnl, anemia or bandemia (could indicate impending worsening infection/course). Complicated UTI (urinary tract infection): acute illness or injury that poses a threat to life or bodily functions  Failure of outpatient treatment: acute illness or injury  Amount and/or Complexity of Data Reviewed  Independent Historian: spouse  External Data Reviewed: notes. Details: outpt urology notes reviewed  Labs: ordered. Decision-making details documented in ED Course. Discussion of management or test interpretation with external provider(s): Diego Col[facility] Crew for admission    Risk  Prescription drug management. Decision regarding hospitalization.           Disposition  Final diagnoses:   Complicated UTI (urinary tract infection)   Failure of outpatient treatment     Time reflects when diagnosis was documented in both MDM as applicable and the Disposition within this note     Time User Action Codes Description Comment    6/30/2023  2:11 PM Trudy Thompson Add [R03.6] Complicated UTI (urinary tract infection)     6/30/2023  2:11 PM Trudy Steele Add [Z78.9] Failure of outpatient treatment       ED Disposition     ED Disposition   Admit    Condition   Stable    Date/Time   Fri Jun 30, 2023  2:17 PM    Comment   Case was discussed with Dr. Jyoti Williamson and the patient's admission status was agreed to be inpatient to the service of Dr. Hardeep Elmore Information     Follow up With Specialties Details Why Contact Info Additional [address]  For Urology [facility] Go on 7/5/2023 1:45PM [address] [address] [facility] [place], [place], [address] [facility]          Current Discharge Medication List      CONTINUE these medications which have NOT CHANGED    Details   ascorbic acid (VITAMIN C) 250 MG CHEW Chew 250 mg 2 (two) times a day      atorvastatin (LIPITOR) 20 mg tablet TAKE ONE TABLET BY MOUTH ONCE EVERY DAY  Qty: 90 tablet, Refills: 3    Comments: This prescription was filled on 6/18/2022. Any refills authorized will be placed on file. Associated Diagnoses: Pure hypercholesterolemia, unspecified      busPIRone (BUSPAR) 7.5 mg tablet 1 tablet in the morning and 2 tablets at night  Qty: 270 tablet, Refills: 1    Associated Diagnoses: Anxiety and depression      dabigatran etexilate (PRADAXA) 150 mg capsu Take 1 capsule (150 mg total) by mouth 2 (two) times a day  Qty: 180 capsule, Refills: 1    Associated Diagnoses: Paroxysmal A-fib (HCC)      Lactobacillus (PROBIOTIC ACIDOPHILUS PO) Take by mouth      losartan (COZAAR) 50 mg tablet Take 1 tablet (50 mg total) by mouth daily  Qty: 90 tablet, Refills: 1    Associated Diagnoses: New onset of congestive heart failure (HCC)      Omega-3 Fatty Acids (FISH OIL) 1,000 mg Take by mouth daily      omeprazole (PriLOSEC) 40 MG capsule Take one capsule by mouth once every day. Qty: 90 capsule, Refills: 3    Associated Diagnoses: Gastroesophageal reflux disease      spironolactone (ALDACTONE) 25 mg tablet Take 1 tablet (25 mg total) by mouth daily  Qty: 90 tablet, Refills: 1    Associated Diagnoses: New onset of congestive heart failure (HCC)      zolpidem (AMBIEN) 5 mg tablet Take 1/2-1 full tablet at bedtime as needed for sleep.   Do not take if drinking any alcohol or taking lorazepam  Qty: 30 tablet, Refills: 0    Associated Diagnoses: Primary insomnia      Acetaminophen 500 MG Take 1,000 mg by mouth prn      Blood Pressure Monitoring (Omron 5 Series BP Monitor) PEE       LORazepam (Ativan) 0.5 mg tablet Take 1 tablet (0.5 mg total) by mouth every 8 (eight) hours as needed for anxiety  Qty: 60 tablet, Refills: 1    Associated Diagnoses: Situational anxiety      montelukast (SINGULAIR) 10 mg tablet TAKE ONE TABLET BY MOUTH DAILY  Qty: 90 tablet, Refills: 3    Associated Diagnoses: Allergic rhinitis due to pollen, unspecified seasonality      nadolol (CORGARD) 80 MG tablet Take 1 tablet (80 mg total) by mouth daily in the early morning  Qty: 90 tablet, Refills: 3    Associated Diagnoses: New onset of congestive heart failure (HCC)      nitrofurantoin (MACROBID) 100 mg capsule Take 1 capsule (100 mg total) by mouth 2 (two) times a day for 7 days  Qty: 14 capsule, Refills: 0    Associated Diagnoses: Acute cystitis with hematuria      vitamin B-12 (CYANOCOBALAMIN) 500 MCG TABS Take 1 tablet (500 mcg total) by mouth daily  Qty: 30 tablet, Refills: 0    Associated Diagnoses: Acute anemia             No discharge procedures on file.     PDMP Review       Value Time User    PDMP Reviewed  Yes 5/19/2023  1:06 PM Loida Wolfe MD          ED Provider  Electronically Signed by           Alida Felix DO  07/01/23 7440

## 2023-06-30 NOTE — TELEPHONE ENCOUNTER
Patient calling in stating he was on antibiotic finished Sunday  Had a [facility] UC on Tuesday came back yesterday  Is now on antibiotic again but not feeling well  Feels he has a fever, chills and is throwing up while on the line with me  Patient was transferred to urology office nursing staff

## 2023-06-30 NOTE — PLAN OF CARE
Problem: PAIN - ADULT  Goal: Verbalizes/displays adequate comfort level or baseline comfort level  Description: Interventions:  - Encourage patient to monitor pain and request assistance  - Assess pain using appropriate pain scale  - Administer analgesics based on type and severity of pain and evaluate response  - Implement non-pharmacological measures as appropriate and evaluate response  - Consider cultural and social influences on pain and pain management  - Notify physician/advanced practitioner if interventions unsuccessful or patient reports new pain  Outcome: Progressing     Problem: SAFETY ADULT  Goal: Patient will remain free of falls  Description: INTERVENTIONS:  - Educate patient/family on patient safety including physical limitations  - Instruct patient to call for assistance with activity   - Consult OT/PT to assist with strengthening/mobility   - Keep Call bell within reach  - Keep bed low and locked with side rails adjusted as appropriate  - Keep care items and personal belongings within reach  - Initiate and maintain comfort rounds  - Make Fall Risk Sign visible to staff  - Offer Toileting every  Hours, in advance of need  - Initiate/Maintain alarm  - Obtain necessary fall risk management equipment:   - Apply yellow socks and bracelet for high fall risk patients  - Consider moving patient to room near nurses station  Outcome: Progressing  Goal: Maintain or return to baseline ADL function  Description: INTERVENTIONS:  -  Assess patient's ability to carry out ADLs; assess patient's baseline for ADL function and identify physical deficits which impact ability to perform ADLs (bathing, care of mouth/teeth, toileting, grooming, dressing, etc.)  - Assess/evaluate cause of self-care deficits   - Assess range of motion  - Assess patient's mobility; develop plan if impaired  - Assess patient's need for assistive devices and provide as appropriate  - Encourage maximum independence but intervene and supervise when necessary  - Involve family in performance of ADLs  - Assess for home care needs following discharge   - Consider OT consult to assist with ADL evaluation and planning for discharge  - Provide patient education as appropriate  Outcome: Progressing  Goal: Maintains/Returns to pre admission functional level  Description: INTERVENTIONS:  - Perform BMAT or MOVE assessment daily.   - Set and communicate daily mobility goal to care team and patient/family/caregiver. - Collaborate with rehabilitation services on mobility goals if consulted  - Perform Range of Motion  times a day. - Reposition patient every  hours. - Dangle patient  times a day  - Stand patient  times a day  - Ambulate patient  times a day  - Out of bed to chair  times a day   - Out of bed for meals times a day  - Out of bed for toileting  - Record patient progress and toleration of activity level   Outcome: Progressing     Problem: DISCHARGE PLANNING  Goal: Discharge to home or other facility with appropriate resources  Description: INTERVENTIONS:  - Identify barriers to discharge w/patient and caregiver  - Arrange for needed discharge resources and transportation as appropriate  - Identify discharge learning needs (meds, wound care, etc.)  - Arrange for interpretive services to assist at discharge as needed  - Refer to Case Management Department for coordinating discharge planning if the patient needs post-hospital services based on physician/advanced practitioner order or complex needs related to functional status, cognitive ability, or social support system  Outcome: Progressing     Problem: Knowledge Deficit  Goal: Patient/family/caregiver demonstrates understanding of disease process, treatment plan, medications, and discharge instructions  Description: Complete learning assessment and assess knowledge base.   Interventions:  - Provide teaching at level of understanding  - Provide teaching via preferred learning methods  Outcome: Progressing

## 2023-06-30 NOTE — TELEPHONE ENCOUNTER
Received transferred call from [facility]  Patient reports Sherrilee Kind is not the appropriate antibiotic as he just completed a course of Macrobid and it was not effective  Advised that Sherrilee Kind was ordered based on most recent urine culture from 6/27 which showed 2 separate bacteria  Sherrilee Kind is the only oral option to treat both bacteria besides Levaquin and patient states he does not want Levaquin  Patient reports intermittent chills  He denies fevers and recently checked his temperature which he reports was 97 7  He does report an episode of emesis this morning and has not had any nausea or vomiting since  Patient performs CIC and understands he is most likely colonized with infection  Patients states he knows more than our office knows and there is going to be a big lawsuit if he ends up septic due to his current infection  Reviewed ER precautions with patient and he is aware he can certainly present to ER for additional testing at this time or if symptoms worsen

## 2023-06-30 NOTE — ASSESSMENT & PLAN NOTE
Background: History of TURP x2, CLL, P A-fib with NSVT s/p ICD with neurogenic bladder who presents with complicated UTI  · Treated with nitrofurantoin as an outpatient. Subsequently repeat cultures still demonstrates e. coli and enterococcus  · Remains symptomatic will follow-up on blood cultures. · Has CLL which may blunt the patient's immune response  · Continue ceftriaxone for e. coli but add ampicillin for enterococcus  · Follows with urology as an outpatient.   Will consult

## 2023-06-30 NOTE — Clinical Note
Case was discussed with  and the patient's admission status was agreed to be  to the service of Dr. Graham

## 2023-06-30 NOTE — ASSESSMENT & PLAN NOTE
· BPH with history of TURP x2 subsequent diverticulectomy  · Straight catheterizes several times daily.   Patient does with sterile technique

## 2023-06-30 NOTE — ASSESSMENT & PLAN NOTE
· Paroxysmal atrial fibrillation with prolonged QT syndrome on nadolol  · Follows with Dr. Yasmin Mccloud and Dr. Chacha Gipson  · Anticoagulation: Continue pradaxa  · ICD in place  · Avoiding QTc prolonging agents

## 2023-07-01 LAB
ALBUMIN SERPL BCP-MCNC: 3.8 G/DL (ref 3.5–5)
ALP SERPL-CCNC: 66 U/L (ref 34–104)
ALT SERPL W P-5'-P-CCNC: 24 U/L (ref 7–52)
ANION GAP SERPL CALCULATED.3IONS-SCNC: 6 MMOL/L
AST SERPL W P-5'-P-CCNC: 17 U/L (ref 13–39)
BILIRUB SERPL-MCNC: 0.8 MG/DL (ref 0.2–1)
BUN SERPL-MCNC: 10 MG/DL (ref 5–25)
CALCIUM SERPL-MCNC: 8.1 MG/DL (ref 8.4–10.2)
CHLORIDE SERPL-SCNC: 101 MMOL/L (ref 96–108)
CO2 SERPL-SCNC: 25 MMOL/L (ref 21–32)
CREAT SERPL-MCNC: 0.73 MG/DL (ref 0.6–1.3)
ERYTHROCYTE [DISTWIDTH] IN BLOOD BY AUTOMATED COUNT: 14.5 % (ref 11.6–15.1)
GFR SERPL CREATININE-BSD FRML MDRD: 93 ML/MIN/1.73SQ M
GLUCOSE SERPL-MCNC: 108 MG/DL (ref 65–140)
HCT VFR BLD AUTO: 29.1 % (ref 36.5–49.3)
HGB BLD-MCNC: 9.3 G/DL (ref 12–17)
MCH RBC QN AUTO: 32.6 PG (ref 26.8–34.3)
MCHC RBC AUTO-ENTMCNC: 32 G/DL (ref 31.4–37.4)
MCV RBC AUTO: 102 FL (ref 82–98)
PLATELET # BLD AUTO: 111 THOUSANDS/UL (ref 149–390)
PMV BLD AUTO: 10.1 FL (ref 8.9–12.7)
POTASSIUM SERPL-SCNC: 3.8 MMOL/L (ref 3.5–5.3)
PROCALCITONIN SERPL-MCNC: 0.22 NG/ML
PROT SERPL-MCNC: 6.3 G/DL (ref 6.4–8.4)
RBC # BLD AUTO: 2.85 MILLION/UL (ref 3.88–5.62)
SODIUM SERPL-SCNC: 132 MMOL/L (ref 135–147)
WBC # BLD AUTO: 18.72 THOUSAND/UL (ref 4.31–10.16)

## 2023-07-01 PROCEDURE — 84145 PROCALCITONIN (PCT): CPT

## 2023-07-01 PROCEDURE — 80053 COMPREHEN METABOLIC PANEL: CPT | Performed by: INTERNAL MEDICINE

## 2023-07-01 PROCEDURE — 99232 SBSQ HOSP IP/OBS MODERATE 35: CPT

## 2023-07-01 PROCEDURE — 85027 COMPLETE CBC AUTOMATED: CPT | Performed by: INTERNAL MEDICINE

## 2023-07-01 RX ORDER — POLYETHYLENE GLYCOL 3350 17 G/17G
17 POWDER, FOR SOLUTION ORAL DAILY
Status: DISCONTINUED | OUTPATIENT
Start: 2023-07-01 | End: 2023-07-02 | Stop reason: HOSPADM

## 2023-07-01 RX ORDER — DOCUSATE SODIUM 100 MG/1
100 CAPSULE, LIQUID FILLED ORAL 2 TIMES DAILY
Status: DISCONTINUED | OUTPATIENT
Start: 2023-07-01 | End: 2023-07-02 | Stop reason: HOSPADM

## 2023-07-01 RX ADMIN — SIMETHICONE 40 MG: 20 EMULSION ORAL at 02:05

## 2023-07-01 RX ADMIN — POLYETHYLENE GLYCOL 3350 17 G: 17 POWDER, FOR SOLUTION ORAL at 12:59

## 2023-07-01 RX ADMIN — DOCUSATE SODIUM 100 MG: 100 CAPSULE, LIQUID FILLED ORAL at 12:59

## 2023-07-01 RX ADMIN — BUSPIRONE HYDROCHLORIDE 7.5 MG: 5 TABLET ORAL at 20:47

## 2023-07-01 RX ADMIN — AMPICILLIN SODIUM 2000 MG: 2 INJECTION, POWDER, FOR SOLUTION INTRAMUSCULAR; INTRAVENOUS at 00:46

## 2023-07-01 RX ADMIN — DABIGATRAN ETEXILATE MESYLATE 150 MG: 150 CAPSULE ORAL at 17:39

## 2023-07-01 RX ADMIN — AMPICILLIN SODIUM 2000 MG: 2 INJECTION, POWDER, FOR SOLUTION INTRAMUSCULAR; INTRAVENOUS at 04:49

## 2023-07-01 RX ADMIN — SPIRONOLACTONE 25 MG: 25 TABLET, FILM COATED ORAL at 08:32

## 2023-07-01 RX ADMIN — DABIGATRAN ETEXILATE MESYLATE 150 MG: 150 CAPSULE ORAL at 08:32

## 2023-07-01 RX ADMIN — OMEGA-3 FATTY ACIDS CAP 1000 MG 1000 MG: 1000 CAP at 08:32

## 2023-07-01 RX ADMIN — LORAZEPAM 0.5 MG: 0.5 TABLET ORAL at 22:18

## 2023-07-01 RX ADMIN — AMPICILLIN SODIUM 2000 MG: 2 INJECTION, POWDER, FOR SOLUTION INTRAMUSCULAR; INTRAVENOUS at 20:49

## 2023-07-01 RX ADMIN — CYANOCOBALAMIN TAB 500 MCG 500 MCG: 500 TAB at 08:32

## 2023-07-01 RX ADMIN — ATORVASTATIN CALCIUM 20 MG: 20 TABLET, FILM COATED ORAL at 17:39

## 2023-07-01 RX ADMIN — LOSARTAN POTASSIUM 50 MG: 50 TABLET, FILM COATED ORAL at 08:32

## 2023-07-01 RX ADMIN — ZOLPIDEM TARTRATE 5 MG: 5 TABLET, COATED ORAL at 22:18

## 2023-07-01 RX ADMIN — PANTOPRAZOLE SODIUM 40 MG: 40 TABLET, DELAYED RELEASE ORAL at 05:34

## 2023-07-01 RX ADMIN — LIDOCAINE 1 PATCH: 50 PATCH CUTANEOUS at 20:48

## 2023-07-01 RX ADMIN — ACETAMINOPHEN 325MG 975 MG: 325 TABLET ORAL at 15:05

## 2023-07-01 RX ADMIN — Medication 250 MG: at 17:39

## 2023-07-01 RX ADMIN — AMPICILLIN SODIUM 2000 MG: 2 INJECTION, POWDER, FOR SOLUTION INTRAMUSCULAR; INTRAVENOUS at 17:39

## 2023-07-01 RX ADMIN — AMPICILLIN SODIUM 2000 MG: 2 INJECTION, POWDER, FOR SOLUTION INTRAMUSCULAR; INTRAVENOUS at 08:32

## 2023-07-01 RX ADMIN — Medication 250 MG: at 08:32

## 2023-07-01 RX ADMIN — BUSPIRONE HYDROCHLORIDE 7.5 MG: 5 TABLET ORAL at 08:32

## 2023-07-01 RX ADMIN — CEFTRIAXONE 1000 MG: 1 INJECTION, SOLUTION INTRAVENOUS at 14:59

## 2023-07-01 RX ADMIN — AMPICILLIN SODIUM 2000 MG: 2 INJECTION, POWDER, FOR SOLUTION INTRAMUSCULAR; INTRAVENOUS at 12:59

## 2023-07-01 RX ADMIN — DOCUSATE SODIUM 100 MG: 100 CAPSULE, LIQUID FILLED ORAL at 17:39

## 2023-07-01 NOTE — PLAN OF CARE
Problem: PAIN - ADULT  Goal: Verbalizes/displays adequate comfort level or baseline comfort level  Description: Interventions:  - Encourage patient to monitor pain and request assistance  - Assess pain using appropriate pain scale  - Administer analgesics based on type and severity of pain and evaluate response  - Implement non-pharmacological measures as appropriate and evaluate response  - Consider cultural and social influences on pain and pain management  - Notify physician/advanced practitioner if interventions unsuccessful or patient reports new pain  Outcome: Progressing     Problem: SAFETY ADULT  Goal: Patient will remain free of falls  Description: INTERVENTIONS:  - Educate patient/family on patient safety including physical limitations  - Instruct patient to call for assistance with activity   - Consult OT/PT to assist with strengthening/mobility   - Keep Call bell within reach  - Keep bed low and locked with side rails adjusted as appropriate  - Keep care items and personal belongings within reach  - Initiate and maintain comfort rounds  - Make Fall Risk Sign visible to staff  - Offer Toileting every 2 Hours, in advance of need  - Initiate/Maintain alarm  - Obtain necessary fall risk management equipment  - Apply yellow socks and bracelet for high fall risk patients  - Consider moving patient to room near nurses station  Outcome: Progressing  Goal: Maintain or return to baseline ADL function  Description: INTERVENTIONS:  -  Assess patient's ability to carry out ADLs; assess patient's baseline for ADL function and identify physical deficits which impact ability to perform ADLs (bathing, care of mouth/teeth, toileting, grooming, dressing, etc.)  - Assess/evaluate cause of self-care deficits   - Assess range of motion  - Assess patient's mobility; develop plan if impaired  - Assess patient's need for assistive devices and provide as appropriate  - Encourage maximum independence but intervene and supervise when necessary  - Involve family in performance of ADLs  - Assess for home care needs following discharge   - Consider OT consult to assist with ADL evaluation and planning for discharge  - Provide patient education as appropriate  Outcome: Progressing  Goal: Maintains/Returns to pre admission functional level  Description: INTERVENTIONS:  - Perform BMAT or MOVE assessment daily.   - Set and communicate daily mobility goal to care team and patient/family/caregiver. - Collaborate with rehabilitation services on mobility goals if consulted  - Perform Range of Motion 3 times a day. - Reposition patient every 2 hours. - Dangle patient 3 times a day  - Stand patient 3 times a day  - Ambulate patient 3 times a day  - Out of bed to chair 3 times a day   - Out of bed for meals 3 times a day  - Out of bed for toileting  - Record patient progress and toleration of activity level   Outcome: Progressing     Problem: DISCHARGE PLANNING  Goal: Discharge to home or other facility with appropriate resources  Description: INTERVENTIONS:  - Identify barriers to discharge w/patient and caregiver  - Arrange for needed discharge resources and transportation as appropriate  - Identify discharge learning needs (meds, wound care, etc.)  - Arrange for interpretive services to assist at discharge as needed  - Refer to Case Management Department for coordinating discharge planning if the patient needs post-hospital services based on physician/advanced practitioner order or complex needs related to functional status, cognitive ability, or social support system  Outcome: Progressing     Problem: Knowledge Deficit  Goal: Patient/family/caregiver demonstrates understanding of disease process, treatment plan, medications, and discharge instructions  Description: Complete learning assessment and assess knowledge base.   Interventions:  - Provide teaching at level of understanding  - Provide teaching via preferred learning methods  Outcome: Progressing     Problem: GENITOURINARY - ADULT  Goal: Maintains or returns to baseline urinary function  Description: INTERVENTIONS:  - Assess urinary function  - Encourage oral fluids to ensure adequate hydration if ordered  - Administer IV fluids as ordered to ensure adequate hydration  - Administer ordered medications as needed  - Offer frequent toileting  - Follow urinary retention protocol if ordered  Outcome: Progressing     Problem: INFECTION - ADULT  Goal: Absence or prevention of progression during hospitalization  Description: INTERVENTIONS:  - Assess and monitor for signs and symptoms of infection  - Monitor lab/diagnostic results  - Monitor all insertion sites, i.e. indwelling lines, tubes, and drains  - Monitor endotracheal if appropriate and nasal secretions for changes in amount and color  - [place] appropriate cooling/warming therapies per order  - Administer medications as ordered  - Instruct and encourage patient and family to use good hand hygiene technique  - Identify and instruct in appropriate isolation precautions for identified infection/condition  Outcome: Progressing

## 2023-07-01 NOTE — ASSESSMENT & PLAN NOTE
· Stable CLL follows with Dr. Gr Counts    Results from last 7 days   Lab Units 07/01/23  0541 06/30/23  1322   WBC Thousand/uL 18.72* 33.45*

## 2023-07-01 NOTE — ASSESSMENT & PLAN NOTE
Patient with hstory of TURP x2, CLL, P A-fib with NSVT s/p ICD with neurogenic bladder who presents with complicated UTI  · Treated with nitrofurantoin as an outpatient. Subsequently repeat cultures still demonstrates e. coli and enterococcus  · Asymptomatic  follow-up on blood cultures. · Has CLL which may blunt the patient's immune response  · Continue ceftriaxone for e.  Coli and ampicillin for enterococcus day 2  · Urology consulted agree with regimen

## 2023-07-01 NOTE — ASSESSMENT & PLAN NOTE
· Paroxysmal atrial fibrillation with prolonged QT syndrome on nadolol  · Follows with Dr. Milagros Hernandez and Dr. Kanika Reeder  · Anticoagulation: Continue pradaxa  · ICD in place  · Avoiding QTc prolonging agents

## 2023-07-01 NOTE — PROGRESS NOTES
[facility]  Progress Note  Name: [place]  MRN: [id number]  Unit/Bed#: [facility] -01 I Date of Admission: 6/30/2023   Date of Service: 7/1/2023 I Hospital Day: 1    Assessment/Plan   * Complicated UTI (urinary tract infection)  Assessment & Plan  Patient with hstory of TURP x2, CLL, P A-fib with NSVT s/p ICD with neurogenic bladder who presents with complicated UTI  · Treated with nitrofurantoin as an outpatient. Subsequently repeat cultures still demonstrates e. coli and enterococcus  · Asymptomatic  follow-up on blood cultures. · Has CLL which may blunt the patient's immune response  · Continue ceftriaxone for e. Coli and ampicillin for enterococcus day 2  · Urology consulted agree with regimen     Paroxysmal A-fib (HCC)  Assessment & Plan  · Paroxysmal atrial fibrillation with prolonged QT syndrome on nadolol  · Follows with Dr. Michael Collier and Dr. Adarsh Remy  · Anticoagulation: Continue pradaxa  · ICD in place  · Avoiding QTc prolonging agents    CLL (chronic lymphocytic leukemia) (HCC)  Assessment & Plan  · Stable CLL follows with Dr. Johnathan Poole    Results from last 7 days   Lab Units 07/01/23  0541 06/30/23  1322   WBC Thousand/uL 18.72* 33.45*       Essential hypertension  Assessment & Plan  · Continue nadolol and losartan and spironolactone    Anxiety and depression  Assessment & Plan  · Stable continue buspirone    BPH with obstruction/lower urinary tract symptoms  Assessment & Plan  · BPH with history of TURP x2 subsequent diverticulectomy  · Straight catheterizes several times daily. Patient does with sterile technique           VTE Pharmacologic Prophylaxis: VTE Score: 3 Moderate Risk (Score 3-4) - Pharmacological DVT Prophylaxis Ordered: dabigatran (Pradaxa). Patient Centered Rounds: I performed bedside rounds with nursing staff today.   Discussions with Specialists or Other Care Team Provider: none    Education and Discussions with Family / Patient: Patient declined call to . Total Time Spent on Date of Encounter in care of patient: 45 minutes This time was spent on one or more of the following: performing physical exam; counseling and coordination of care; obtaining or reviewing history; documenting in the medical record; reviewing/ordering tests, medications or procedures; communicating with other healthcare professionals and discussing with patient's family/caregivers. Current Length of Stay: 1 day(s)  Current Patient Status: Inpatient   Certification Statement: The patient will continue to require additional inpatient hospital stay due to complicated UTI requiring IV antibiotics  Discharge Plan: Anticipate discharge in 24-48 hrs to home. Code Status: Level 1 - Full Code    Subjective:   Patient was seen sitting in chair at bedside. He reports feeling well. He has been ambulating. He is very upset with urology over UTI treatment and expressed this to me. We discussed urine culture and treatment. He denies any acute complaints     Objective:     Vitals:   Temp (24hrs), Av.5 °F (36.9 °C), Min:98 °F (36.7 °C), Max:99.1 °F (37.3 °C)    Temp:  [98 °F (36.7 °C)-99.1 °F (37.3 °C)] 98 °F (36.7 °C)  HR:  [70-72] 71  Resp:  [17-21] 21  BP: (108-135)/(60-72) 130/72  SpO2:  [96 %-98 %] 96 %  Body mass index is 29.5 kg/m². Input and Output Summary (last 24 hours): Intake/Output Summary (Last 24 hours) at 2023 0942  Last data filed at 2023 1545  Gross per 24 hour   Intake 550 ml   Output --   Net 550 ml       Physical Exam:   Physical Exam  Vitals and nursing note reviewed. Constitutional:       Appearance: Normal appearance. HENT:      Head: Normocephalic and atraumatic. Eyes:      General: No scleral icterus. Cardiovascular:      Rate and Rhythm: Normal rate and regular rhythm. Heart sounds: Murmur heard.    Pulmonary:      Effort: Pulmonary effort is normal.      Comments: Diminished breath sounds  Abdominal:      General: Abdomen is flat. Bowel sounds are normal.      Palpations: Abdomen is soft. Musculoskeletal:      Right lower leg: No edema. Left lower leg: No edema. Skin:     General: Skin is warm and dry. Neurological:      Mental Status: He is alert. Mental status is at baseline. Psychiatric:         Mood and Affect: Mood normal.         Behavior: Behavior normal.          Additional Data:     Labs:  Results from last 7 days   Lab Units 07/01/23  0541 06/30/23  1322   WBC Thousand/uL 18.72* 33.45*   HEMOGLOBIN g/dL 9.3* 11.8*   HEMATOCRIT % 29.1* 34.7*   PLATELETS Thousands/uL 111* 177   LYMPHO PCT %  --  82*   MONO PCT %  --  2*   EOS PCT %  --  0     Results from last 7 days   Lab Units 07/01/23  0541   SODIUM mmol/L 132*   POTASSIUM mmol/L 3.8   CHLORIDE mmol/L 101   CO2 mmol/L 25   BUN mg/dL 10   CREATININE mg/dL 0.73   ANION GAP mmol/L 6   CALCIUM mg/dL 8.1*   ALBUMIN g/dL 3.8   TOTAL BILIRUBIN mg/dL 0.80   ALK PHOS U/L 66   ALT U/L 24   AST U/L 17   GLUCOSE RANDOM mg/dL 108                       Lines/Drains:  Invasive Devices     Peripheral Intravenous Line  Duration           Long-Dwell Peripheral IV (Midline) 03/07/23 Left 115 days    Peripheral IV 06/30/23 Left Antecubital <1 day                      Imaging: No pertinent imaging reviewed. Recent Cultures (last 7 days):   Results from last 7 days   Lab Units 06/30/23  1322 06/27/23  0647   BLOOD CULTURE  Received in Microbiology Lab. Culture in Progress. Received in Microbiology Lab. Culture in Progress.   --    URINE CULTURE   --  >100,000 cfu/ml Escherichia coli*  50,000-59,000 cfu/ml Enterococcus faecalis*       Last 24 Hours Medication List:   Current Facility-Administered Medications   Medication Dose Route Frequency Provider Last Rate   • acetaminophen  975 mg Oral Q8H PRN Sierra Hall PA-C     • ampicillin  2,000 mg Intravenous Q4H Celestino Thorpe DO 2,000 mg (07/01/23 1466)   • ascorbic acid  250 mg Oral BID Sara Cousin, DO     • atorvastatin  20 mg Oral Daily With [employer], DO     • busPIRone  7.5 mg Oral Daily Maria A Fuentes, DO      And   • busPIRone  7.5 mg Oral HS Celestino Thorpe, DO     • cefTRIAXone  1,000 mg Intravenous Q24H Maria A Fuentes, DO     • vitamin B-12  500 mcg Oral Daily Maria A Broteresa, DO     • dabigatran etexilate  150 mg Oral BID Maria A Broteresa, DO     • fish oil  1,000 mg Oral Daily Maria A Broteresa, DO     • lidocaine  1 patch Topical Daily Samara Handy PA-C     • LORazepam  0.5 mg Oral Q8H PRN Maria A Fuentes, DO     • losartan  50 mg Oral Daily Celestino Thorpe, DO     • nadolol  80 mg Oral Early Morning Celestino Thorpe, DO     • ondansetron  4 mg Intravenous Q4H PRN Maria A Fuentes, DO     • pantoprazole  40 mg Oral Early Morning Celestino Thorpe, DO     • simethicone  40 mg Oral Q6H PRN Maria A Fuentes, DO     • spironolactone  25 mg Oral Daily Maria A Fuentes, DO     • zolpidem  5 mg Oral HS PRN Maria A Fuentes, DO          Today, Patient Was Seen By: Osmel Reeec PA-C    **Please Note: This note may have been constructed using a voice recognition system. **

## 2023-07-02 VITALS
DIASTOLIC BLOOD PRESSURE: 82 MMHG | RESPIRATION RATE: 18 BRPM | WEIGHT: 194 LBS | OXYGEN SATURATION: 97 % | TEMPERATURE: 97.9 F | BODY MASS INDEX: 29.4 KG/M2 | HEIGHT: 68 IN | SYSTOLIC BLOOD PRESSURE: 138 MMHG | HEART RATE: 71 BPM

## 2023-07-02 LAB
ANION GAP SERPL CALCULATED.3IONS-SCNC: 7 MMOL/L
BUN SERPL-MCNC: 7 MG/DL (ref 5–25)
CALCIUM SERPL-MCNC: 8.2 MG/DL (ref 8.4–10.2)
CHLORIDE SERPL-SCNC: 103 MMOL/L (ref 96–108)
CO2 SERPL-SCNC: 27 MMOL/L (ref 21–32)
CREAT SERPL-MCNC: 0.7 MG/DL (ref 0.6–1.3)
ERYTHROCYTE [DISTWIDTH] IN BLOOD BY AUTOMATED COUNT: 14.6 % (ref 11.6–15.1)
GFR SERPL CREATININE-BSD FRML MDRD: 95 ML/MIN/1.73SQ M
GLUCOSE SERPL-MCNC: 109 MG/DL (ref 65–140)
HCT VFR BLD AUTO: 29.7 % (ref 36.5–49.3)
HGB BLD-MCNC: 9.5 G/DL (ref 12–17)
MCH RBC QN AUTO: 33.7 PG (ref 26.8–34.3)
MCHC RBC AUTO-ENTMCNC: 32 G/DL (ref 31.4–37.4)
MCV RBC AUTO: 105 FL (ref 82–98)
NRBC BLD AUTO-RTO: 0 /100 WBCS
PLATELET # BLD AUTO: 106 THOUSANDS/UL (ref 149–390)
PMV BLD AUTO: 10.3 FL (ref 8.9–12.7)
POTASSIUM SERPL-SCNC: 4 MMOL/L (ref 3.5–5.3)
RBC # BLD AUTO: 2.82 MILLION/UL (ref 3.88–5.62)
SODIUM SERPL-SCNC: 137 MMOL/L (ref 135–147)
WBC # BLD AUTO: 12.91 THOUSAND/UL (ref 4.31–10.16)

## 2023-07-02 PROCEDURE — 85027 COMPLETE CBC AUTOMATED: CPT

## 2023-07-02 PROCEDURE — 99239 HOSP IP/OBS DSCHRG MGMT >30: CPT

## 2023-07-02 PROCEDURE — 80048 BASIC METABOLIC PNL TOTAL CA: CPT

## 2023-07-02 RX ORDER — DOCUSATE SODIUM 100 MG/1
100 CAPSULE, LIQUID FILLED ORAL 2 TIMES DAILY
Qty: 60 CAPSULE | Refills: 0 | Status: SHIPPED | OUTPATIENT
Start: 2023-07-02 | End: 2023-08-01

## 2023-07-02 RX ORDER — AMOXICILLIN 500 MG/1
500 CAPSULE ORAL EVERY 8 HOURS SCHEDULED
Qty: 21 CAPSULE | Refills: 0 | Status: SHIPPED | OUTPATIENT
Start: 2023-07-02 | End: 2023-07-09

## 2023-07-02 RX ORDER — CEFDINIR 300 MG/1
300 CAPSULE ORAL EVERY 12 HOURS SCHEDULED
Qty: 14 CAPSULE | Refills: 0 | Status: SHIPPED | OUTPATIENT
Start: 2023-07-02 | End: 2023-07-09

## 2023-07-02 RX ADMIN — Medication 250 MG: at 09:19

## 2023-07-02 RX ADMIN — POLYETHYLENE GLYCOL 3350 17 G: 17 POWDER, FOR SOLUTION ORAL at 09:19

## 2023-07-02 RX ADMIN — AMPICILLIN SODIUM 2000 MG: 2 INJECTION, POWDER, FOR SOLUTION INTRAMUSCULAR; INTRAVENOUS at 05:15

## 2023-07-02 RX ADMIN — DOCUSATE SODIUM 100 MG: 100 CAPSULE, LIQUID FILLED ORAL at 09:19

## 2023-07-02 RX ADMIN — CYANOCOBALAMIN TAB 500 MCG 500 MCG: 500 TAB at 09:19

## 2023-07-02 RX ADMIN — LOSARTAN POTASSIUM 50 MG: 50 TABLET, FILM COATED ORAL at 09:19

## 2023-07-02 RX ADMIN — PANTOPRAZOLE SODIUM 40 MG: 40 TABLET, DELAYED RELEASE ORAL at 06:23

## 2023-07-02 RX ADMIN — BUSPIRONE HYDROCHLORIDE 7.5 MG: 5 TABLET ORAL at 09:19

## 2023-07-02 RX ADMIN — OMEGA-3 FATTY ACIDS CAP 1000 MG 1000 MG: 1000 CAP at 09:19

## 2023-07-02 RX ADMIN — NADOLOL 80 MG: 40 TABLET ORAL at 06:24

## 2023-07-02 RX ADMIN — DABIGATRAN ETEXILATE MESYLATE 150 MG: 150 CAPSULE ORAL at 09:19

## 2023-07-02 RX ADMIN — AMPICILLIN SODIUM 2000 MG: 2 INJECTION, POWDER, FOR SOLUTION INTRAMUSCULAR; INTRAVENOUS at 09:19

## 2023-07-02 RX ADMIN — AMPICILLIN SODIUM 2000 MG: 2 INJECTION, POWDER, FOR SOLUTION INTRAMUSCULAR; INTRAVENOUS at 00:53

## 2023-07-02 RX ADMIN — SPIRONOLACTONE 25 MG: 25 TABLET, FILM COATED ORAL at 09:19

## 2023-07-02 NOTE — ASSESSMENT & PLAN NOTE
Patient with hstory of TURP x2, CLL, P A-fib with NSVT s/p ICD with neurogenic bladder who presents with complicated UTI  · Treated with nitrofurantoin as an outpatient. Subsequently repeat cultures still demonstrates e. coli and enterococcus  · Blood cultures negative at 24 hours   · Has CLL which may blunt the patient's immune response  · Recieved ceftriaxone for e.  Coli and ampicillin for enterococcus for 3 days, continue on amoxicillin 500 mg three times a day and cefdinir 300 mg twice a day for an additional 7 days  · Urology consulted, follow up appointment scheduled on 7/5/23  · Follow up with family doctor

## 2023-07-02 NOTE — ASSESSMENT & PLAN NOTE
· Paroxysmal atrial fibrillation with prolonged QT syndrome on nadolol  · Follows with Dr. Clive Avila and Dr. Vega Husbands  · Anticoagulation: Continue pradaxa  · ICD in place  · Avoiding QTc prolonging agents

## 2023-07-02 NOTE — PLAN OF CARE
Problem: PAIN - ADULT  Goal: Verbalizes/displays adequate comfort level or baseline comfort level  Description: Interventions:  - Encourage patient to monitor pain and request assistance  - Assess pain using appropriate pain scale  - Administer analgesics based on type and severity of pain and evaluate response  - Implement non-pharmacological measures as appropriate and evaluate response  - Consider cultural and social influences on pain and pain management  - Notify physician/advanced practitioner if interventions unsuccessful or patient reports new pain  7/2/2023 0938 by Sharmaine Breaux RN  Outcome: Adequate for Discharge  7/2/2023 0938 by Sharmaine Breaux RN  Outcome: Progressing     Problem: SAFETY ADULT  Goal: Patient will remain free of falls  Description: INTERVENTIONS:  - Educate patient/family on patient safety including physical limitations  - Instruct patient to call for assistance with activity   - Consult OT/PT to assist with strengthening/mobility   - Keep Call bell within reach  - Keep bed low and locked with side rails adjusted as appropriate  - Keep care items and personal belongings within reach  - Initiate and maintain comfort rounds  - Make Fall Risk Sign visible to staff  - Offer Toileting every 2 Hours, in advance of need  - Initiate/Maintain alarm  - Obtain necessary fall risk management equipment  - Apply yellow socks and bracelet for high fall risk patients  - Consider moving patient to room near nurses station  7/2/2023 0938 by Sharmaine Breaux RN  Outcome: Adequate for Discharge  7/2/2023 0938 by Sharmaine Breaux RN  Outcome: Progressing  Goal: Maintain or return to baseline ADL function  Description: INTERVENTIONS:  -  Assess patient's ability to carry out ADLs; assess patient's baseline for ADL function and identify physical deficits which impact ability to perform ADLs (bathing, care of mouth/teeth, toileting, grooming, dressing, etc.)  - Assess/evaluate cause of self-care deficits   - Assess range of motion  - Assess patient's mobility; develop plan if impaired  - Assess patient's need for assistive devices and provide as appropriate  - Encourage maximum independence but intervene and supervise when necessary  - Involve family in performance of ADLs  - Assess for home care needs following discharge   - Consider OT consult to assist with ADL evaluation and planning for discharge  - Provide patient education as appropriate  7/2/2023 0938 by Leesa Ferrari RN  Outcome: Adequate for Discharge  7/2/2023 0938 by Leesa Ferrari RN  Outcome: Progressing  Goal: Maintains/Returns to pre admission functional level  Description: INTERVENTIONS:  - Perform BMAT or MOVE assessment daily.   - Set and communicate daily mobility goal to care team and patient/family/caregiver. - Collaborate with rehabilitation services on mobility goals if consulted  - Perform Range of Motion 3 times a day. - Reposition patient every 2 hours.   - Dangle patient 3 times a day  - Stand patient 3 times a day  - Ambulate patient 3 times a day  - Out of bed to chair 3 times a day   - Out of bed for meals 3 times a day  - Out of bed for toileting  - Record patient progress and toleration of activity level   7/2/2023 0938 by Leesa Ferrari RN  Outcome: Adequate for Discharge  7/2/2023 3028 by Leesa Ferrari RN  Outcome: Progressing     Problem: DISCHARGE PLANNING  Goal: Discharge to home or other facility with appropriate resources  Description: INTERVENTIONS:  - Identify barriers to discharge w/patient and caregiver  - Arrange for needed discharge resources and transportation as appropriate  - Identify discharge learning needs (meds, wound care, etc.)  - Arrange for interpretive services to assist at discharge as needed  - Refer to Case Management Department for coordinating discharge planning if the patient needs post-hospital services based on physician/advanced practitioner order or complex needs related to functional status, cognitive ability, or social support system  7/2/2023 0938 by Suresh Bermudez RN  Outcome: Adequate for Discharge  7/2/2023 3077 by Suresh Bermudez RN  Outcome: Progressing     Problem: Knowledge Deficit  Goal: Patient/family/caregiver demonstrates understanding of disease process, treatment plan, medications, and discharge instructions  Description: Complete learning assessment and assess knowledge base.   Interventions:  - Provide teaching at level of understanding  - Provide teaching via preferred learning methods  7/2/2023 0938 by Suresh Bermudez RN  Outcome: Adequate for Discharge  7/2/2023 0938 by Suresh Bermudez RN  Outcome: Progressing     Problem: GENITOURINARY - ADULT  Goal: Maintains or returns to baseline urinary function  Description: INTERVENTIONS:  - Assess urinary function  - Encourage oral fluids to ensure adequate hydration if ordered  - Administer IV fluids as ordered to ensure adequate hydration  - Administer ordered medications as needed  - Offer frequent toileting  - Follow urinary retention protocol if ordered  7/2/2023 0938 by Suresh Bermudez RN  Outcome: Adequate for Discharge  7/2/2023 0938 by Suresh Bermudez RN  Outcome: Progressing     Problem: INFECTION - ADULT  Goal: Absence or prevention of progression during hospitalization  Description: INTERVENTIONS:  - Assess and monitor for signs and symptoms of infection  - Monitor lab/diagnostic results  - Monitor all insertion sites, i.e. indwelling lines, tubes, and drains  - Monitor endotracheal if appropriate and nasal secretions for changes in amount and color  - [place] appropriate cooling/warming therapies per order  - Administer medications as ordered  - Instruct and encourage patient and family to use good hand hygiene technique  - Identify and instruct in appropriate isolation precautions for identified infection/condition  7/2/2023 0938 by Suresh Bermudez RN  Outcome: Adequate for Discharge  7/2/2023 0938 by Suresh Bermudez RN  Outcome: Progressing

## 2023-07-02 NOTE — DISCHARGE INSTR - AVS FIRST PAGE
Internal medicine discharge instructions  -follow up with urology outpatient appointment scheduled on the 5th of this month  -Continue on two different antibiotics at discharge for continued treatment of UTI.  Continue on amoxicillin 500 mg 3 times a day for an additional 7 days and cefdinir 300 mg twice a day for an additional 7 days  -follow up with your family doctor

## 2023-07-02 NOTE — PLAN OF CARE
Problem: PAIN - ADULT  Goal: Verbalizes/displays adequate comfort level or baseline comfort level  Description: Interventions:  - Encourage patient to monitor pain and request assistance  - Assess pain using appropriate pain scale  - Administer analgesics based on type and severity of pain and evaluate response  - Implement non-pharmacological measures as appropriate and evaluate response  - Consider cultural and social influences on pain and pain management  - Notify physician/advanced practitioner if interventions unsuccessful or patient reports new pain  Outcome: Progressing     Problem: SAFETY ADULT  Goal: Patient will remain free of falls  Description: INTERVENTIONS:  - Educate patient/family on patient safety including physical limitations  - Instruct patient to call for assistance with activity   - Consult OT/PT to assist with strengthening/mobility   - Keep Call bell within reach  - Keep bed low and locked with side rails adjusted as appropriate  - Keep care items and personal belongings within reach  - Initiate and maintain comfort rounds  - Make Fall Risk Sign visible to staff  - Offer Toileting every 2 Hours, in advance of need  - Initiate/Maintain alarm  - Obtain necessary fall risk management equipment  - Apply yellow socks and bracelet for high fall risk patients  - Consider moving patient to room near nurses station  Outcome: Progressing  Goal: Maintain or return to baseline ADL function  Description: INTERVENTIONS:  -  Assess patient's ability to carry out ADLs; assess patient's baseline for ADL function and identify physical deficits which impact ability to perform ADLs (bathing, care of mouth/teeth, toileting, grooming, dressing, etc.)  - Assess/evaluate cause of self-care deficits   - Assess range of motion  - Assess patient's mobility; develop plan if impaired  - Assess patient's need for assistive devices and provide as appropriate  - Encourage maximum independence but intervene and supervise when necessary  - Involve family in performance of ADLs  - Assess for home care needs following discharge   - Consider OT consult to assist with ADL evaluation and planning for discharge  - Provide patient education as appropriate  Outcome: Progressing  Goal: Maintains/Returns to pre admission functional level  Description: INTERVENTIONS:  - Perform BMAT or MOVE assessment daily.   - Set and communicate daily mobility goal to care team and patient/family/caregiver. - Collaborate with rehabilitation services on mobility goals if consulted  - Perform Range of Motion 3 times a day. - Reposition patient every 2 hours. - Dangle patient 3 times a day  - Stand patient 3 times a day  - Ambulate patient 3 times a day  - Out of bed to chair 3 times a day   - Out of bed for meals 3 times a day  - Out of bed for toileting  - Record patient progress and toleration of activity level   Outcome: Progressing     Problem: DISCHARGE PLANNING  Goal: Discharge to home or other facility with appropriate resources  Description: INTERVENTIONS:  - Identify barriers to discharge w/patient and caregiver  - Arrange for needed discharge resources and transportation as appropriate  - Identify discharge learning needs (meds, wound care, etc.)  - Arrange for interpretive services to assist at discharge as needed  - Refer to Case Management Department for coordinating discharge planning if the patient needs post-hospital services based on physician/advanced practitioner order or complex needs related to functional status, cognitive ability, or social support system  Outcome: Progressing     Problem: Knowledge Deficit  Goal: Patient/family/caregiver demonstrates understanding of disease process, treatment plan, medications, and discharge instructions  Description: Complete learning assessment and assess knowledge base.   Interventions:  - Provide teaching at level of understanding  - Provide teaching via preferred learning methods  Outcome: Progressing     Problem: GENITOURINARY - ADULT  Goal: Maintains or returns to baseline urinary function  Description: INTERVENTIONS:  - Assess urinary function  - Encourage oral fluids to ensure adequate hydration if ordered  - Administer IV fluids as ordered to ensure adequate hydration  - Administer ordered medications as needed  - Offer frequent toileting  - Follow urinary retention protocol if ordered  Outcome: Progressing     Problem: INFECTION - ADULT  Goal: Absence or prevention of progression during hospitalization  Description: INTERVENTIONS:  - Assess and monitor for signs and symptoms of infection  - Monitor lab/diagnostic results  - Monitor all insertion sites, i.e. indwelling lines, tubes, and drains  - Monitor endotracheal if appropriate and nasal secretions for changes in amount and color  - [place] appropriate cooling/warming therapies per order  - Administer medications as ordered  - Instruct and encourage patient and family to use good hand hygiene technique  - Identify and instruct in appropriate isolation precautions for identified infection/condition  Outcome: Progressing

## 2023-07-02 NOTE — ASSESSMENT & PLAN NOTE
· Stable CLL follows with Dr. Analisa Bergeron    Results from last 7 days   Lab Units 07/02/23  0520 07/01/23  0541 06/30/23  1322   WBC Thousand/uL 12.91* 18.72* 33.45*

## 2023-07-02 NOTE — PLAN OF CARE
Problem: PAIN - ADULT  Goal: Verbalizes/displays adequate comfort level or baseline comfort level  Description: Interventions:  - Encourage patient to monitor pain and request assistance  - Assess pain using appropriate pain scale  - Administer analgesics based on type and severity of pain and evaluate response  - Implement non-pharmacological measures as appropriate and evaluate response  - Consider cultural and social influences on pain and pain management  - Notify physician/advanced practitioner if interventions unsuccessful or patient reports new pain  Outcome: Progressing     Problem: SAFETY ADULT  Goal: Patient will remain free of falls  Description: INTERVENTIONS:  - Educate patient/family on patient safety including physical limitations  - Instruct patient to call for assistance with activity   - Consult OT/PT to assist with strengthening/mobility   - Keep Call bell within reach  - Keep bed low and locked with side rails adjusted as appropriate  - Keep care items and personal belongings within reach  - Initiate and maintain comfort rounds  - Make Fall Risk Sign visible to staff  - Offer Toileting every \ Hours, in advance of need  - Initiate/Maintain alarm  - Obtain necessary fall risk management equipment:   - Apply yellow socks and bracelet for high fall risk patients  - Consider moving patient to room near nurses station  Outcome: Progressing  Goal: Maintain or return to baseline ADL function  Description: INTERVENTIONS:  -  Assess patient's ability to carry out ADLs; assess patient's baseline for ADL function and identify physical deficits which impact ability to perform ADLs (bathing, care of mouth/teeth, toileting, grooming, dressing, etc.)  - Assess/evaluate cause of self-care deficits   - Assess range of motion  - Assess patient's mobility; develop plan if impaired  - Assess patient's need for assistive devices and provide as appropriate  - Encourage maximum independence but intervene and supervise when necessary  - Involve family in performance of ADLs  - Assess for home care needs following discharge   - Consider OT consult to assist with ADL evaluation and planning for discharge  - Provide patient education as appropriate  Outcome: Progressing  Goal: Maintains/Returns to pre admission functional level  Description: INTERVENTIONS:  - Perform BMAT or MOVE assessment daily.   - Set and communicate daily mobility goal to care team and patient/family/caregiver. - Collaborate with rehabilitation services on mobility goals if consulted  - Perform Range of Motion  times a day. - Reposition patient every  hours. - Dangle patient  times a day  - Stand patient  times a day  - Ambulate patient  times a day  - Out of bed to chair  times a day   - Out of bed for meals  times a day  - Out of bed for toileting  - Record patient progress and toleration of activity level   Outcome: Progressing     Problem: DISCHARGE PLANNING  Goal: Discharge to home or other facility with appropriate resources  Description: INTERVENTIONS:  - Identify barriers to discharge w/patient and caregiver  - Arrange for needed discharge resources and transportation as appropriate  - Identify discharge learning needs (meds, wound care, etc.)  - Arrange for interpretive services to assist at discharge as needed  - Refer to Case Management Department for coordinating discharge planning if the patient needs post-hospital services based on physician/advanced practitioner order or complex needs related to functional status, cognitive ability, or social support system  Outcome: Progressing     Problem: Knowledge Deficit  Goal: Patient/family/caregiver demonstrates understanding of disease process, treatment plan, medications, and discharge instructions  Description: Complete learning assessment and assess knowledge base.   Interventions:  - Provide teaching at level of understanding  - Provide teaching via preferred learning methods  Outcome: Progressing     Problem: INFECTION - ADULT  Goal: Absence or prevention of progression during hospitalization  Description: INTERVENTIONS:  - Assess and monitor for signs and symptoms of infection  - Monitor lab/diagnostic results  - Monitor all insertion sites, i.e. indwelling lines, tubes, and drains  - Monitor endotracheal if appropriate and nasal secretions for changes in amount and color  - [place] appropriate cooling/warming therapies per order  - Administer medications as ordered  - Instruct and encourage patient and family to use good hand hygiene technique  - Identify and instruct in appropriate isolation precautions for identified infection/condition  Outcome: Progressing     Problem: GENITOURINARY - ADULT  Goal: Maintains or returns to baseline urinary function  Description: INTERVENTIONS:  - Assess urinary function  - Encourage oral fluids to ensure adequate hydration if ordered  - Administer IV fluids as ordered to ensure adequate hydration  - Administer ordered medications as needed  - Offer frequent toileting  - Follow urinary retention protocol if ordered  Outcome: Progressing

## 2023-07-02 NOTE — NURSING NOTE
Reviewed discharge instructions with patient at bedside. Patient verbalized understanding of all discharge instructions. Discharge instructions given to patient at time of discharge. IV removed, roberto disconnected. VSS. All questions addressed, no further questions or concerns at this time. Patient discharged to home with family.

## 2023-07-02 NOTE — DISCHARGE SUMMARY
[facility]  Discharge- Vidya Littlejohn 1950, 81 y.o. male MRN: [id number]  Unit/Bed#: 1575 [address] [place] 209-01 Encounter: [id number]  Primary Care Provider: Cindy Blanco MD   Date and time admitted to hospital: 6/30/2023 02:55 PM    * Complicated UTI (urinary tract infection)  Assessment & Plan  Patient with hstory of TURP x2, CLL, P A-fib with NSVT s/p ICD with neurogenic bladder who presents with complicated UTI  · Treated with nitrofurantoin as an outpatient. Subsequently repeat cultures still demonstrates e. coli and enterococcus  · Blood cultures negative at 24 hours   · Has CLL which may blunt the patient's immune response  · Recieved ceftriaxone for e. Coli and ampicillin for enterococcus for 3 days, continue on amoxicillin 500 mg three times a day and cefdinir 300 mg twice a day for an additional 7 days  · Urology consulted, follow up appointment scheduled on 7/5/23  · Follow up with family doctor    Paroxysmal A-fib [facility])  Assessment & Plan  · Paroxysmal atrial fibrillation with prolonged QT syndrome on nadolol  · Follows with Dr. Clive Avila and Dr. Vega Husbands  · Anticoagulation: Continue pradaxa  · ICD in place  · Avoiding QTc prolonging agents    CLL (chronic lymphocytic leukemia) ([address])  Assessment & Plan  · Stable CLL follows with Dr. Jay Hebert    Results from last 7 days   Lab Units 07/02/23  0520 07/01/23  0541 06/30/23  1322   WBC Thousand/uL 12.91* 18.72* 33.45*       Essential hypertension  Assessment & Plan  · Continue nadolol and losartan and spironolactone    Anxiety and depression  Assessment & Plan  · Stable continue buspirone    BPH with obstruction/lower urinary tract symptoms  Assessment & Plan  · BPH with history of TURP x2 subsequent diverticulectomy  · Straight catheterizes several times daily.   Patient does with sterile technique      Medical Problems     Resolved Problems  Date Reviewed: 7/1/2023   None       Discharging Physician / Practitioner: Wilber Varela Sally Berry PA-C  PCP: Lori Matthews MD  Admission Date:   Admission Orders (From admission, onward)     Ordered        06/30/23 [address] [place] [address]  Once                      Discharge Date: 07/02/23    Consultations During Hospital Stay:  · urology    Procedures Performed:   · none    Significant Findings / Test Results:   · none    Incidental Findings:   · none    Test Results Pending at Discharge (will require follow up):   · none     Outpatient Tests Requested:  · none    Complications:  none    Reason for Admission: complicated UTI    Hospital Course:   Mary Lewis is a 81 y.o. male patient who originally presented to the hospital on 6/30/2023 due to subjective fevers and chills reported at home secondary to UTI. Patient was being treated for UTI outpatient with Link Berry however had worsening symptoms and therefore presented to the ED. Outpatient he routinely straight caths several times a day using sterile technique. Patient was admitted and started on IV ampicillin and ceftriaxone for treatment of E. coli and Enterococcus seen on urine culture. Urology was consulted and agreed with recommendations and scheduled outpatient follow-up appointment on 7/5/2023. Patient remained afebrile while inpatient and with blood cell count continued to trend down. Blood cultures came back negative at 24 hours. Patient is medically cleared for discharge today he will continue on cefdinir 300 mg twice a day for an additional 7 days and amoxicillin 500 mg 3 times a day for an additional 7 days. He will follow-up with urology at scheduled outpatient appointment. He will follow-up with his family doctor. Please see above list of diagnoses and related plan for additional information. Condition at Discharge: stable    Discharge Day Visit / Exam:   Subjective:  Patient was seen laying in bed today. He reports feeling well he remains asymptomatic he denies any acute complaints.    Vitals: Blood Pressure: 132/74 (07/02/23 6091)  Pulse: 72 (07/02/23 0624)  Temperature: 97.8 °F (36.6 °C) (07/01/23 2221)  Temp Source: Oral (07/01/23 1509)  Respirations: 18 (07/01/23 1509)  Height: 5' 8" (172.7 cm) (06/30/23 1753)  Weight - Scale: 88 kg (194 lb 0.1 oz) (06/30/23 1753)  SpO2: 97 % (07/01/23 2221)  Exam:   Physical Exam  Vitals and nursing note reviewed. Constitutional:       Appearance: Normal appearance. He is obese. HENT:      Head: Normocephalic and atraumatic. Eyes:      General: No scleral icterus. Cardiovascular:      Rate and Rhythm: Normal rate and regular rhythm. Pulmonary:      Effort: Pulmonary effort is normal.      Breath sounds: Normal breath sounds. Abdominal:      General: Abdomen is flat. Bowel sounds are normal.      Palpations: Abdomen is soft. Tenderness: There is no abdominal tenderness. Musculoskeletal:      Right lower leg: No edema. Left lower leg: No edema. Skin:     General: Skin is warm and dry. Neurological:      Mental Status: He is alert. Mental status is at baseline. Psychiatric:         Mood and Affect: Mood normal.         Behavior: Behavior normal.          Discussion with Family: Updated  (wife) via phone. syl    Discharge instructions/Information to patient and family:   See after visit summary for information provided to patient and family. Provisions for Follow-Up Care:  See after visit summary for information related to follow-up care and any pertinent home health orders. Disposition:   Home    Planned Readmission: none     Discharge Statement:  I spent 60 minutes discharging the patient. This time was spent on the day of discharge. I had direct contact with the patient on the day of discharge. Greater than 50% of the total time was spent examining patient, answering all patient questions, arranging and discussing plan of care with patient as well as directly providing post-discharge instructions.   Additional time then spent on discharge activities. Discharge Medications:  See after visit summary for reconciled discharge medications provided to patient and/or family.       **Please Note: This note may have been constructed using a voice recognition system** none

## 2023-07-03 ENCOUNTER — APPOINTMENT (OUTPATIENT)
Dept: CARDIAC REHAB | Age: 73
End: 2023-07-03
Payer: MEDICARE

## 2023-07-05 ENCOUNTER — TELEPHONE (OUTPATIENT)
Dept: CARDIOLOGY CLINIC | Facility: CLINIC | Age: 73
End: 2023-07-05

## 2023-07-05 ENCOUNTER — OFFICE VISIT (OUTPATIENT)
Dept: UROLOGY | Facility: AMBULATORY SURGERY CENTER | Age: 73
End: 2023-07-05
Payer: MEDICARE

## 2023-07-05 VITALS
SYSTOLIC BLOOD PRESSURE: 152 MMHG | BODY MASS INDEX: 29.55 KG/M2 | HEIGHT: 68 IN | DIASTOLIC BLOOD PRESSURE: 90 MMHG | WEIGHT: 195 LBS | HEART RATE: 92 BPM | OXYGEN SATURATION: 99 %

## 2023-07-05 DIAGNOSIS — N30.00 ACUTE CYSTITIS WITHOUT HEMATURIA: Primary | ICD-10-CM

## 2023-07-05 LAB
BACTERIA BLD CULT: NORMAL
BACTERIA BLD CULT: NORMAL

## 2023-07-05 PROCEDURE — 99214 OFFICE O/P EST MOD 30 MIN: CPT | Performed by: UROLOGY

## 2023-07-05 RX ORDER — NITROFURANTOIN 25; 75 MG/1; MG/1
100 CAPSULE ORAL DAILY
Qty: 90 CAPSULE | Refills: 2 | Status: SHIPPED | OUTPATIENT
Start: 2023-07-05 | End: 2024-03-31

## 2023-07-05 NOTE — LETTER
July 5, 2023     Andres Shields MD  [address]  [facility] [place] [address]    Patient: Shilpa Ballard   YOB: 1950   Date of Visit: 7/5/2023       Dear Dr. Steele Boards: Thank you for referring Damien Maya to me for evaluation. Below are my notes for this consultation. If you have questions, please do not hesitate to call me. I look forward to following your patient along with you. Sincerely,        Aliya Sinclair MD        CC: DO Aliya Loepz MD  7/5/2023  2:21 PM  Sign when Signing Visit  7/5/2023    Shilpa Ballard  1950  [id number]        Assessment  BPH, status post TURP x2, history of large bladder diverticulum status post robot-assisted laparoscopic bladder diverticulectomy [facility]), recurrent urinary tract infection, hypertension      Discussion  He will continue on the ampicillin as well as the cefdinir for his combined infection of E. coli and Enterococcus. When he completes antibiotics I have suggested that he go back onto Macrobid 100 mg daily for suppression. Both the E. coli and Enterococcus are sensitive to the Macrobid. He will continue to perform clean intermittent catheterization as many times as needed during the day. In the interim he is concerned about his blood pressure. Systolic today in the office was greater than 150. I have recommended that he contact his primary physician/cardiologist for a routine blood pressure check. His next scheduled follow-up with me will be in the fall 2023 at which time we were discussing a repeat cystoscopy. I have placed a referral to infectious disease for any recommendations regarding suppression as he likely has colonization and will always have bacteriuria. History of Present Illness  81 y.o. male with a history of BPH, status post TURP x2. He performs clean intermittent catheterization. He does not void spontaneously.   Prior cystoscopies have showed a relatively patent prostatic urethra. He was again hospitalized this past week for recurrent urinary tract infection. Blood cultures were negative. Urine culture showed both E. coli as well as Enterococcus. He is currently asymptomatic at this time. He was just discharged from [facility]. He continues on ampicillin and cefdinir for the combined E. coli and Enterococcus infection. He does not void spontaneously. AUA Symptom Score      Review of Systems  Review of Systems   Constitutional: Negative. HENT: Negative. Eyes: Negative. Respiratory: Negative. Cardiovascular: Negative. Gastrointestinal: Negative. Endocrine: Negative. Genitourinary:        Per HPI   Musculoskeletal: Negative. Skin: Negative. Allergic/Immunologic: Negative. Neurological: Negative. Hematological: Negative. Psychiatric/Behavioral: Negative.           Past Medical History  Past Medical History:   Diagnosis Date   • Anxiety    • BPH (benign prostatic hypertrophy)    • Cancer (HCC)     CLL   • CLL (chronic lymphocytic leukemia) ([address])     2009   • Colon polyp    • Concussion     Resolved: 08/22/16   • Depression    • Diverticulitis 01/13/2020 2014 CT done 11/19 12/19 CT done    • E coli bacteremia 06/27/2021 2/2 blood cultures with Tatyana Mills appears to be the urine    • Epididymitis 05/19/2021 5/2021   • Gastrointestinal hemorrhage     Last assessed: 08/27/13   • GERD (gastroesophageal reflux disease)    • H/O vitamin D deficiency 07/07/2021   • Hearing loss of aging    • Hiatal hernia    • History of right hip replacement 04/19/2021   • History of transfusion    • Hyperlipidemia    • Hypertension    • Hyponatremia 03/06/2023   • Iron deficiency anemia    • Microscopic hematuria     Last assessed: 06/28/13   • OA (osteoarthritis)     right hip   • Obesity (BMI 30.0-34.9) 04/07/2022   • Pneumothorax    • Primary osteoarthritis of right hip 09/29/2017 He is status post right hip arthroplasty   • Prostatitis    • Pulmonary hypertension (HCC)    • QT prolongation    • Seasonal allergies    • Sepsis ([address]) 03/06/2023   • Unresponsive episode 03/07/2023   • Urinary tract infection associated with catheterization of urinary tract ([address]) 02/05/2021    Pseudomonal UTI asymptomatic. Per Urology do not treat at this time. • Urinary tract infection associated with catheterization of urinary tract ([address]) 02/05/2021    Pseudomonal UTI asymptomatic. Per Urology do not treat at this time. He did have urosepsis from E coli 7/21   • UTI (urinary tract infection)     in past   • Wears glasses        Past Social History  Past Surgical History:   Procedure Laterality Date   • BLADDER SURGERY     • CARDIAC CATHETERIZATION Left 3/7/2023    Procedure: Cardiac Left Heart Cath;  Surgeon: Boston Escobedo MD;  [facility]; Service: Cardiology   • CARDIAC CATHETERIZATION N/A 3/7/2023    Procedure: Cardiac temporary pacemaker;  Surgeon: Boston Escobedo MD;  [facility]; Service: Cardiology   • CARDIAC ELECTROPHYSIOLOGY PROCEDURE N/A 3/8/2023    Procedure: Cardiac icd implant;  Surgeon: Olya Bridges MD;  [facility];   Service: Cardiology   • COLONOSCOPY     • CYSTOSCOPY  10/09/2014    Diagnostic   • CYSTOSCOPY  06/29/2020   • FL CYSTOGRAM  08/15/2022   • FRACTURE SURGERY      left lower arm   • FRACTURE SURGERY      left femur   • HERNIA REPAIR     • JOINT REPLACEMENT Right     hip   • OTHER SURGICAL HISTORY  03/2011    Spinal anesthesia epidural   • [place] ARTHRP ACETBLR/PROX FEM PROSTC AGRFT/ALGRFT Right 09/29/2017    Procedure: ARTHROPLASTY HIP TOTAL ANTERIOR;  Surgeon: Boni Padilla MD;  Location: [facility];  Service: Orthopedics   • TONSILLECTOMY AND ADENOIDECTOMY     • TRANSURETHRAL RESECTION OF PROSTATE      x 2   • WRIST SURGERY         Past Family History  Family History   Problem Relation Age of Onset   • No Known Problems Mother • Heart attack Father    • Diabetes Brother    • Prostate cancer Brother        Past Social history  Social History     Socioeconomic History   • Marital status: /Civil Union     Spouse name: Not on file   • Number of children: Not on file   • Years of education: Not on file   • Highest education level: Not on file   Occupational History   • Not on file   Tobacco Use   • Smoking status: Former     Packs/day: 1.00     Years: 15.00     Total pack years: 15.00     Types: Cigarettes     Start date: 67     Quit date: 1980     Years since quittin.8   • Smokeless tobacco: Never   Vaping Use   • Vaping Use: Never used   Substance and Sexual Activity   • Alcohol use: Not Currently     Alcohol/week: 10.0 standard drinks of alcohol     Types: 10 Cans of beer per week     Comment: no more drinking   • Drug use: Not Currently     Types: Marijuana     Comment: "medical marijuana"   • Sexual activity: Not Currently   Other Topics Concern   • Not on file   Social History Narrative   • Not on file     Social Determinants of Health     Financial Resource Strain: Low Risk  (10/17/2022)    Overall Financial Resource Strain (CARDIA)    • Difficulty of Paying Living Expenses: Not hard at all   Food Insecurity: No Food Insecurity (3/9/2023)    Hunger Vital Sign    • Worried About Running Out of Food in the Last Year: Never true    • Ran Out of Food in the Last Year: Never true   Transportation Needs: No Transportation Needs (3/9/2023)    PRAPARE - Transportation    • Lack of Transportation (Medical): No    • Lack of Transportation (Non-Medical):  No   Physical Activity: Not on file   Stress: Not on file   Social Connections: Not on file   Intimate Partner Violence: Not on file   Housing Stability: Low Risk  (3/9/2023)    Housing Stability Vital Sign    • Unable to Pay for Housing in the Last Year: No    • Number of Places Lived in the Last Year: 1    • Unstable Housing in the Last Year: No       Current Medications  Current Outpatient Medications   Medication Sig Dispense Refill   • Acetaminophen 500 MG Take 1,000 mg by mouth prn     • amoxicillin (AMOXIL) 500 mg capsule Take 1 capsule (500 mg total) by mouth every 8 (eight) hours for 7 days 21 capsule 0   • ascorbic acid (VITAMIN C) 250 MG CHEW Chew 250 mg 2 (two) times a day     • atorvastatin (LIPITOR) 20 mg tablet TAKE ONE TABLET BY MOUTH ONCE EVERY DAY 90 tablet 3   • Blood Pressure Monitoring (Omron 5 Series BP Monitor) PEE      • busPIRone (BUSPAR) 7.5 mg tablet 1 tablet in the morning and 2 tablets at night 270 tablet 1   • cefdinir (OMNICEF) 300 mg capsule Take 1 capsule (300 mg total) by mouth every 12 (twelve) hours for 7 days 14 capsule 0   • dabigatran etexilate (PRADAXA) 150 mg capsu Take 1 capsule (150 mg total) by mouth 2 (two) times a day 180 capsule 1   • docusate sodium (COLACE) 100 mg capsule Take 1 capsule (100 mg total) by mouth 2 (two) times a day 60 capsule 0   • Lactobacillus (PROBIOTIC ACIDOPHILUS PO) Take by mouth     • LORazepam (Ativan) 0.5 mg tablet Take 1 tablet (0.5 mg total) by mouth every 8 (eight) hours as needed for anxiety 60 tablet 1   • losartan (COZAAR) 50 mg tablet Take 1 tablet (50 mg total) by mouth daily 90 tablet 1   • montelukast (SINGULAIR) 10 mg tablet TAKE ONE TABLET BY MOUTH DAILY 90 tablet 3   • Omega-3 Fatty Acids (FISH OIL) 1,000 mg Take by mouth daily     • omeprazole (PriLOSEC) 40 MG capsule Take one capsule by mouth once every day. 90 capsule 3   • spironolactone (ALDACTONE) 25 mg tablet Take 1 tablet (25 mg total) by mouth daily 90 tablet 1   • vitamin B-12 (CYANOCOBALAMIN) 500 MCG TABS Take 1 tablet (500 mcg total) by mouth daily 30 tablet 0   • zolpidem (AMBIEN) 5 mg tablet Take 1/2-1 full tablet at bedtime as needed for sleep.   Do not take if drinking any alcohol or taking lorazepam 30 tablet 0   • nadolol (CORGARD) 80 MG tablet Take 1 tablet (80 mg total) by mouth daily in the early morning (Patient not taking: Reported on 6/30/2023) 90 tablet 3     No current facility-administered medications for this visit. Allergies  Allergies   Allergen Reactions   • Ciprofloxacin Other (See Comments)     LONG QT syndrome   • Codeine Other (See Comments)     agitated   • Sulfamethoxazole-Trimethoprim GI Intolerance and Abdominal Pain     upset stomach       Past Medical History, Social History, Family History, medications and allergies were reviewed. Vitals  Vitals:    07/05/23 1343   BP: 152/90   Pulse: 92   SpO2: 99%   Weight: 88.5 kg (195 lb)   Height: 5' 8" (1.727 m)       Physical Exam  Physical Exam    On examination he is in no acute distress.   Gait normal.  Affect normal  Results  Lab Results   Component Value Date    PSA 0.1 04/18/2023    PSA 0.4 07/29/2021    PSA 0.4 01/27/2021     Lab Results   Component Value Date    GLUCOSE 130 01/10/2021    CALCIUM 8.2 (L) 07/02/2023     (L) 07/20/2015    K 4.0 07/02/2023    CO2 27 07/02/2023     07/02/2023    BUN 7 07/02/2023    CREATININE 0.70 07/02/2023     Lab Results   Component Value Date    WBC 12.91 (H) 07/02/2023    HGB 9.5 (L) 07/02/2023    HCT 29.7 (L) 07/02/2023     (H) 07/02/2023     (L) 07/02/2023         Office Urine Dip  No results found for this or any previous visit (from the past 1 hour(s)).]

## 2023-07-05 NOTE — TELEPHONE ENCOUNTER
Called patient and gave him Dr Elizabet Carr instructions to monitor and call in five days. Patient still worried but agreed. I gave him instructions on how to take BP properly. He will also be having BP checks at cardiac rehab and keep log of those.

## 2023-07-05 NOTE — PROGRESS NOTES
7/5/2023    Lorena Piedra  1950  [id number]        Assessment  BPH, status post TURP x2, history of large bladder diverticulum status post robot-assisted laparoscopic bladder diverticulectomy [facility]), recurrent urinary tract infection, hypertension      Discussion  He will continue on the ampicillin as well as the cefdinir for his combined infection of E. coli and Enterococcus. When he completes antibiotics I have suggested that he go back onto Macrobid 100 mg daily for suppression. Both the E. coli and Enterococcus are sensitive to the Macrobid. He will continue to perform clean intermittent catheterization as many times as needed during the day. In the interim he is concerned about his blood pressure. Systolic today in the office was greater than 150. I have recommended that he contact his primary physician/cardiologist for a routine blood pressure check. His next scheduled follow-up with me will be in the fall 2023 at which time we were discussing a repeat cystoscopy. I have placed a referral to infectious disease for any recommendations regarding suppression as he likely has colonization and will always have bacteriuria. History of Present Illness  81 y.o. male with a history of BPH, status post TURP x2. He performs clean intermittent catheterization. He does not void spontaneously. Prior cystoscopies have showed a relatively patent prostatic urethra. He was again hospitalized this past week for recurrent urinary tract infection. Blood cultures were negative. Urine culture showed both E. coli as well as Enterococcus. He is currently asymptomatic at this time. He was just discharged from [address] [place] [address]. He continues on ampicillin and cefdinir for the combined E. coli and Enterococcus infection. He does not void spontaneously. AUA Symptom Score      Review of Systems  Review of Systems   Constitutional: Negative. HENT: Negative. Eyes: Negative. Respiratory: Negative. Cardiovascular: Negative. Gastrointestinal: Negative. Endocrine: Negative. Genitourinary:        Per HPI   Musculoskeletal: Negative. Skin: Negative. Allergic/Immunologic: Negative. Neurological: Negative. Hematological: Negative. Psychiatric/Behavioral: Negative. Past Medical History  Past Medical History:   Diagnosis Date   • Anxiety    • BPH (benign prostatic hypertrophy)    • Cancer (HCC)     CLL   • CLL (chronic lymphocytic leukemia) ([address])     2009   • Colon polyp    • Concussion     Resolved: 08/22/16   • Depression    • Diverticulitis 01/13/2020 2014 CT done 11/19 12/19 CT done    • E coli bacteremia 06/27/2021 2/2 blood cultures with Brenton Hoskins appears to be the urine    • Epididymitis 05/19/2021 5/2021   • Gastrointestinal hemorrhage     Last assessed: 08/27/13   • GERD (gastroesophageal reflux disease)    • H/O vitamin D deficiency 07/07/2021   • Hearing loss of aging    • Hiatal hernia    • History of right hip replacement 04/19/2021   • History of transfusion    • Hyperlipidemia    • Hypertension    • Hyponatremia 03/06/2023   • Iron deficiency anemia    • Microscopic hematuria     Last assessed: 06/28/13   • OA (osteoarthritis)     right hip   • Obesity (BMI 30.0-34.9) 04/07/2022   • Pneumothorax    • Primary osteoarthritis of right hip 09/29/2017    He is status post right hip arthroplasty   • Prostatitis    • Pulmonary hypertension (HCC)    • QT prolongation    • Seasonal allergies    • Sepsis ([address]) 03/06/2023   • Unresponsive episode 03/07/2023   • Urinary tract infection associated with catheterization of urinary tract ([address]) 02/05/2021    Pseudomonal UTI asymptomatic. Per Urology do not treat at this time. • Urinary tract infection associated with catheterization of urinary tract ([address]) 02/05/2021    Pseudomonal UTI asymptomatic. Per Urology do not treat at this time.   He did have urosepsis from E coli    • UTI (urinary tract infection)     in past   • Wears glasses        Past Social History  Past Surgical History:   Procedure Laterality Date   • BLADDER SURGERY     • CARDIAC CATHETERIZATION Left 3/7/2023    Procedure: Cardiac Left Heart Cath;  Surgeon: Jammie De Dios MD;  [facility]; Service: Cardiology   • CARDIAC CATHETERIZATION N/A 3/7/2023    Procedure: Cardiac temporary pacemaker;  Surgeon: Jammie De Dios MD;  [facility]; Service: Cardiology   • CARDIAC ELECTROPHYSIOLOGY PROCEDURE N/A 3/8/2023    Procedure: Cardiac icd implant;  Surgeon: Bob Buck MD;  [facility];   Service: Cardiology   • COLONOSCOPY     • CYSTOSCOPY  10/09/2014    Diagnostic   • CYSTOSCOPY  2020   • FL CYSTOGRAM  08/15/2022   • FRACTURE SURGERY      left lower arm   • FRACTURE SURGERY      left femur   • HERNIA REPAIR     • JOINT REPLACEMENT Right     hip   • OTHER SURGICAL HISTORY  2011    Spinal anesthesia epidural   • [place] ARTHRP ACETBLR/PROX FEM PROSTC AGRFT/ALGRFT Right 2017    Procedure: ARTHROPLASTY HIP TOTAL ANTERIOR;  Surgeon: Checo Elaine MD;  Location: [facility];  Service: Orthopedics   • TONSILLECTOMY AND ADENOIDECTOMY     • TRANSURETHRAL RESECTION OF PROSTATE      x 2   • WRIST SURGERY         Past Family History  Family History   Problem Relation Age of Onset   • No Known Problems Mother    • Heart attack Father    • Diabetes Brother    • Prostate cancer Brother        Past Social history  Social History     Socioeconomic History   • Marital status: /Civil Union     Spouse name: Not on file   • Number of children: Not on file   • Years of education: Not on file   • Highest education level: Not on file   Occupational History   • Not on file   Tobacco Use   • Smoking status: Former     Packs/day: 1.00     Years: 15.00     Total pack years: 15.00     Types: Cigarettes     Start date: 67     Quit date: 1980     Years since quittin.8   • Smokeless tobacco: Never   Vaping Use   • Vaping Use: Never used   Substance and Sexual Activity   • Alcohol use: Not Currently     Alcohol/week: 10.0 standard drinks of alcohol     Types: 10 Cans of beer per week     Comment: no more drinking   • Drug use: Not Currently     Types: Marijuana     Comment: "medical marijuana"   • Sexual activity: Not Currently   Other Topics Concern   • Not on file   Social History Narrative   • Not on file     Social Determinants of Health     Financial Resource Strain: Low Risk  (10/17/2022)    Overall Financial Resource Strain (CARDIA)    • Difficulty of Paying Living Expenses: Not hard at all   Food Insecurity: No Food Insecurity (3/9/2023)    Hunger Vital Sign    • Worried About Running Out of Food in the Last Year: Never true    • Ran Out of Food in the Last Year: Never true   Transportation Needs: No Transportation Needs (3/9/2023)    PRAPARE - Transportation    • Lack of Transportation (Medical): No    • Lack of Transportation (Non-Medical):  No   Physical Activity: Not on file   Stress: Not on file   Social Connections: Not on file   Intimate Partner Violence: Not on file   Housing Stability: Low Risk  (3/9/2023)    Housing Stability Vital Sign    • Unable to Pay for Housing in the Last Year: No    • Number of Places Lived in the Last Year: 1    • Unstable Housing in the Last Year: No       Current Medications  Current Outpatient Medications   Medication Sig Dispense Refill   • Acetaminophen 500 MG Take 1,000 mg by mouth prn     • amoxicillin (AMOXIL) 500 mg capsule Take 1 capsule (500 mg total) by mouth every 8 (eight) hours for 7 days 21 capsule 0   • ascorbic acid (VITAMIN C) 250 MG CHEW Chew 250 mg 2 (two) times a day     • atorvastatin (LIPITOR) 20 mg tablet TAKE ONE TABLET BY MOUTH ONCE EVERY DAY 90 tablet 3   • Blood Pressure Monitoring (Omron 5 Series BP Monitor) PEE      • busPIRone (BUSPAR) 7.5 mg tablet 1 tablet in the morning and 2 tablets at night 270 tablet 1 • cefdinir (OMNICEF) 300 mg capsule Take 1 capsule (300 mg total) by mouth every 12 (twelve) hours for 7 days 14 capsule 0   • dabigatran etexilate (PRADAXA) 150 mg capsu Take 1 capsule (150 mg total) by mouth 2 (two) times a day 180 capsule 1   • docusate sodium (COLACE) 100 mg capsule Take 1 capsule (100 mg total) by mouth 2 (two) times a day 60 capsule 0   • Lactobacillus (PROBIOTIC ACIDOPHILUS PO) Take by mouth     • LORazepam (Ativan) 0.5 mg tablet Take 1 tablet (0.5 mg total) by mouth every 8 (eight) hours as needed for anxiety 60 tablet 1   • losartan (COZAAR) 50 mg tablet Take 1 tablet (50 mg total) by mouth daily 90 tablet 1   • montelukast (SINGULAIR) 10 mg tablet TAKE ONE TABLET BY MOUTH DAILY 90 tablet 3   • Omega-3 Fatty Acids (FISH OIL) 1,000 mg Take by mouth daily     • omeprazole (PriLOSEC) 40 MG capsule Take one capsule by mouth once every day. 90 capsule 3   • spironolactone (ALDACTONE) 25 mg tablet Take 1 tablet (25 mg total) by mouth daily 90 tablet 1   • vitamin B-12 (CYANOCOBALAMIN) 500 MCG TABS Take 1 tablet (500 mcg total) by mouth daily 30 tablet 0   • zolpidem (AMBIEN) 5 mg tablet Take 1/2-1 full tablet at bedtime as needed for sleep. Do not take if drinking any alcohol or taking lorazepam 30 tablet 0   • nadolol (CORGARD) 80 MG tablet Take 1 tablet (80 mg total) by mouth daily in the early morning (Patient not taking: Reported on 6/30/2023) 90 tablet 3     No current facility-administered medications for this visit. Allergies  Allergies   Allergen Reactions   • Ciprofloxacin Other (See Comments)     LONG QT syndrome   • Codeine Other (See Comments)     agitated   • Sulfamethoxazole-Trimethoprim GI Intolerance and Abdominal Pain     upset stomach       Past Medical History, Social History, Family History, medications and allergies were reviewed.     Vitals  Vitals:    07/05/23 1343   BP: 152/90   Pulse: 92   SpO2: 99%   Weight: 88.5 kg (195 lb)   Height: 5' 8" (1.727 m) Physical Exam  Physical Exam    On examination he is in no acute distress.   Gait normal.  Affect normal  Results  Lab Results   Component Value Date    PSA 0.1 04/18/2023    PSA 0.4 07/29/2021    PSA 0.4 01/27/2021     Lab Results   Component Value Date    GLUCOSE 130 01/10/2021    CALCIUM 8.2 (L) 07/02/2023     (L) 07/20/2015    K 4.0 07/02/2023    CO2 27 07/02/2023     07/02/2023    BUN 7 07/02/2023    CREATININE 0.70 07/02/2023     Lab Results   Component Value Date    WBC 12.91 (H) 07/02/2023    HGB 9.5 (L) 07/02/2023    HCT 29.7 (L) 07/02/2023     (H) 07/02/2023     (L) 07/02/2023         Office Urine Dip  No results found for this or any previous visit (from the past 1 hour(s)).]

## 2023-07-05 NOTE — TELEPHONE ENCOUNTER
Please reassure him that 160/90 is not dangerously high, but certainly high enough for us to get more BP readings. Please advise BP checks over 5 days. If his SBP > 170, he may take an extra 1/2 tablet of losartan that day. Once we get some more readings we can make additional recommendations. Please review proper blood pressure monitoring techniques. He should sit for at least 5 minutes before checking his blood pressure with feet flat on the floor, back supported, with normal breathing throughout. He should check it twice a day for the next 3 to 5 days and then call us with his blood pressure readings.   Thank you

## 2023-07-05 NOTE — TELEPHONE ENCOUNTER
Patient called office very upset that his BP's have "gone very high" the past three days. He usually has very good BP readings but today 160/90 and the other days "were close to the same." He did not have numbers for me. He has UTI and Dr Pete Crawford put him on two new medications, he is not sure what, and ever since, his BP's spiked. Since he has had a cardiac arrest in the past, he is very concerned. Dr Pete Crawford is "going to contact Dr Delon So, about these BP readings". I asked him to keep track of BP's on log sheet, but he wants this problem addressed right away. When he is at cardiac rehab,"BP's are normal". He takes Losartan 50 mgs daily and has had no issues, until he started with these new medications. (Ampicillin, Cefdinir, and Macrobid)  Please advise.

## 2023-07-06 ENCOUNTER — APPOINTMENT (OUTPATIENT)
Dept: CARDIAC REHAB | Age: 73
End: 2023-07-06
Payer: MEDICARE

## 2023-07-07 ENCOUNTER — TRANSITIONAL CARE MANAGEMENT (OUTPATIENT)
Dept: FAMILY MEDICINE CLINIC | Facility: CLINIC | Age: 73
End: 2023-07-07

## 2023-07-07 DIAGNOSIS — F51.01 PRIMARY INSOMNIA: ICD-10-CM

## 2023-07-07 NOTE — TELEPHONE ENCOUNTER
Pt returned call. He does not want to complete the TCM because he had a urology issue. He f/u with the urologist Dr. Kt Ken as well as Dr. Shellie Arriaga. He will be seen by an infection specialist as he has build up of bacteria in urine. He does not see why he should be seeing his PCP since he is being taken care of at the specialist. Pt does want to continue with Ambien as he stated it is making him sleep well. Pt stated if PCP feels he should be seen sooner than his next visit in the end of the month he will reconsider but as of right now he does not want too.

## 2023-07-10 ENCOUNTER — TELEPHONE (OUTPATIENT)
Dept: FAMILY MEDICINE CLINIC | Facility: CLINIC | Age: 73
End: 2023-07-10

## 2023-07-10 ENCOUNTER — CLINICAL SUPPORT (OUTPATIENT)
Dept: CARDIAC REHAB | Age: 73
End: 2023-07-10
Payer: MEDICARE

## 2023-07-10 ENCOUNTER — TELEPHONE (OUTPATIENT)
Dept: CARDIOLOGY CLINIC | Facility: CLINIC | Age: 73
End: 2023-07-10

## 2023-07-10 DIAGNOSIS — I50.22 CHRONIC SYSTOLIC HEART FAILURE (HCC): ICD-10-CM

## 2023-07-10 PROCEDURE — 93798 PHYS/QHP OP CAR RHAB W/ECG: CPT

## 2023-07-10 RX ORDER — ZOLPIDEM TARTRATE 5 MG/1
TABLET ORAL
Qty: 30 TABLET | Refills: 0 | Status: SHIPPED | OUTPATIENT
Start: 2023-07-10

## 2023-07-10 NOTE — PROGRESS NOTES
Cardiac Rehabilitation Plan of Care   90 Day Reassessment          Today's date: 7/10/2023   # of Exercise Sessions Completed: 27  Patient name: Netta Klinefelter      : 1950  Age: 81 y.o. MRN: [id number]  Referring Physician: Tasia Rodriguez DO  Cardiologist: Dr. Miguel Ross  Provider: Herman Cervantes  Clinician: Chun Carson MS, CEP      Dx:   Encounter Diagnosis   Name Primary? • Chronic systolic heart failure [facility])      Date of onset: 3/12/23      SUMMARY OF PROGRESS:    7/10/23: Pt was not available for discussion at the time this note is being written. Brando Elliott is compliant attending cardiac rehab exercise sessions 3x/wk. He tolerates 40-45 mins at 3.0-3.3 METs. A light strength training component has been added to their exercise program.  He just returned from being absent for a 1 week d/t hospitalization for UTI. He is tolerating progression of intensity levels to maintain RPE 4-6. Resting BP  118//72 with Normal/flat response to exercise: 128/72. Paced rhythm on tele. RHR 75-87 with Normal response to exercise reaching . He has not added home exercise d/t walking limitation from hip pain. He reports increased strength and stamina after starting regular exercise at [facility]. He says he has more motivation for activity and feels good when he leaves. No cardiac complaints. He is progressing toward wt loss goals with a loss of 1 pounds. Brando Elliott stopped daily weights at home since they were always steady. Brando Elliott has been working on  dietary modifications with the goal of rare red/processed meats, low fat dairy, reduced added sugars and refined flours. He has returned to drinking alcohol in moderation. States he is abstaining from marijuana. The patient is a former smoker (quit 43 years ago). Depression was not reassessed. He admits to anxiety and sees a psychologist 2x/month as well as buspar. Patient reports excellent  social/emotional support from his cousin and close friend.   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 Stress management techniques were reinforced. Patient attends group educational classes on cardiac risk factor modification. His exercise program will be progressed as tolerated to maintain RPE 4-6. The patient has the following personal goals he hopes to achieved by discharge: Lose weight, regular exercise routine. They will continue to be educated on lifestyle modification and encouraged to supplement with a home exercise program as tolerated to reach the following goals in the next 30 days: increased workloads, attempt home walking as tolerated by back pain, focus on stress management and healthy eating for wt loss, find gym to join.       Medication compliance: Yes   Comments: Pt reports to be compliant with medications  Fall Risk: Low   Comments: Ambulates with a steady gait with no assist device and Denies a fall in the past 6 months    EKG Interpretation: Paced      EXERCISE ASSESSMENT and PLAN    Exercise Prescription:      Frequency: 3 days/week   Supplement with home exercise 2+ days/wk as tolerated       Minutes: 40        METS: 3.0-3.3           HR: RHR+30   RPE: 4-6         Modalities: Treadmill, UBE, NuStep and Recumbent bike      30 Day Goals for Exercise Progression:    Frequency: 3 days/week of cardiac rehab       Supplement with home exercise 2+ days/wk as tolerated    Minutes: 40                              >150 mins/wk of moderate intensity exercise   METS: 3.3-3.5   HR: RHR+30bpm    RPE: 4-6   Modalities: Treadmill, UBE, Lifecycle, NuStep and Recumbent bike    Strength trainin-3 days / week  12-15 repetitions  1-2 sets per modality    Modalities: Leg Press, Chest Press, Pull Downs and Lateral Raise    Home Exercise: none    Goals: 10% improvement in functional capacity - based on max METs achieved in fitness assessment, improved DASI score by 10%, Resume ADLs with increased strength, Attend Rehab regularly and start a home exercise program    Progression Toward Goals:  Pt is progressing and showing improvement  toward the following goals:  10% improvement in functional capacity - based on max METs achieved in fitness assessment, improved DASI score by 10%, Resume ADLs with increased strength, Attend Rehab regularly and start a home exercise program.  , Pt has not made progress toward the following goals: home exercise. Education: home exercise guidelines, AHA guidelines to achieve >150 mins/wk of moderate exercise, RPE scale, physical activity/exercise in extreme weather conditions and Long QT   Plan:education on home exercise guidelines and home exercise 30+ mins 2 days opposite CR  Readiness to change: Contemplation:  (Acknowledging that there is a problem but not yet ready or sure of wanting to make a change) and Action:  (Changing behavior)      NUTRITION ASSESSMENT AND PLAN    Weight control:    Starting weight: 191   Current weight:   191    Lipid management: Last lipid profile 4/18/23  Chol 118    HDL 31  LDL 63    Goals:Improved Rate Your Plate score  >09, choose lean meat (93-95%), reduce portion sizes of meat to 3oz or less, Eat 4-5 cups of fruits and vegetables daily, eliminate or choose low-fat sweets and weight loss    Measurable goals were based Rate Your Plate Dietary Self-Assessment. These are the areas in which the patient could score higher on the assessment. Goals include recommendations for a heart healthy diet based on American Heart Association. Progression Toward Goals: Pt is progressing and showing improvement  toward the following goals:  Improved Rate Your Plate score  >82, choose lean meat (93-95%), reduce portion sizes of meat to 3oz or less, Eat 4-5 cups of fruits and vegetables daily, eliminate or choose low-fat sweets and weight loss.   , Patient will eliminate soda, follow low sodium diet, increase fish intake in the next 30 days, he has abstained from marijuana and drinking in moderation. Education: heart healthy eating  low sodium diet  hydration  nutrition for  lipid management  wt. loss   portion control  education class:  Label Reading  Plan: Education Class: Heart Healthy Eating, reduce portion sizes, eat fewer desserts and sweets, avoid processed foods, will replace sugar sweetened cereals with whole grain or oatmeal and abstain from alcohol  Readiness to change: Preparation:  (Getting ready to change)       PSYCHOSOCIAL ASSESSMENT AND PLAN    Emotional:  Depression assessment:  PHQ-9 = 8  5-9 = Mild Depression            Anxiety measure:  ANNALISE-7 = 13  10-14 = Moderate anxiety  Self-reported stress level:  6  Social support: Patient reports excellent emotional/social support from psychologist, cousin and Patient has friends available for support when needed     Goals:  Reduce perceived stress to 1-3/10, PHQ-9 - reduced severity by one level, follow up with therapist, Feelings in [place] Score < 3, Physical Fitness in [place] Score < 3, Daily Activity in [place] Score < 3, Social Activities in [place] Score < 3, Overall Health in [place] Score < 3, Quality of Life in [place] Score < 3 , improved sleep, improved positive thoughts of well being, increased energy, stop worrying, Feel less anxious and Handle anger/frustration better    Progression Toward Goals: Pt has not made progress toward the following goals: Reduce perceived stress to 1-3/10, PHQ-9 - reduced severity by one level, follow up with therapist, Feelings in [place] Score < 3, Physical Fitness in [place] Score < 3, Daily Activity in [place] Score < 3, Social Activities in [place] Score < 3, Overall Health in [place] Score < 3, Quality of Life in [place] Score < 3 , improved sleep, improved positive thoughts of well being, increased energy, stop worrying, Feel less anxious and Handle anger/frustration better.  , Patient will f/u with psychotherapist, take buspar as needed in the next 30 days, Will continue to educate and progress as tolerated., He socializes at the club. Education: benefits of a positive support system, stress management techniques, class:  Stress and Your Health  and class:  Relaxation  Plan: PHQ-9 >5 will refer to MD, Refer to [employer], Exercise, Join a support group  and maintain visits with therapist, practice meditation and hang out with friends  Readiness to change: Preparation:  (Getting ready to change)  and Action:  (Changing behavior)      OTHER CORE COMPONENTS     Tobacco:   Social History     Tobacco Use   Smoking Status Former   • Packs/day: 1.00   • Years: 15.00   • Total pack years: 15.00   • Types: Cigarettes   • Start date: 67   • Quit date: 1980   • Years since quittin.8   Smokeless Tobacco Never       Tobacco Use Intervention:   Pt quit many years ago   and has abstained    Anginal Symptoms:  None   NTG use: No prescription    Blood pressure:    Restin//80   Exercise: 132//80    Goals: moderate intensity exercise >150 mins/wk, medication compliance, Abstain from smoking and abstain from marijuana    Progression Toward Goals: Pt is progressing and showing improvement  toward the following goals:  moderate intensity exercise >150 mins/wk, medication compliance, Abstain from smoking and abstain from marijuana. , Pt has not made progress toward the following goals: home exercise. , Will continue to educate and progress as tolerated.     Education:  low sodium diet and HTN, Education class: Understanding Heart Disease, smoking and heart disease, relapse education, smoking cessation and effecs of alcohol/MJ   Plan: Class: Common Heart Medications, Complete abstention from smoking and engage in regular exercise  Readiness to change: Action:  (Changing behavior)

## 2023-07-10 NOTE — TELEPHONE ENCOUNTER
Date/Time: 7-10-23 / 4:42 PM    Pt called in stating he left a message on Fri., July 7, 2023 about his Ambien. He is out of his medication. I see it is pending. Please fill if appropriate.

## 2023-07-10 NOTE — TELEPHONE ENCOUNTER
Phone call from patient. Blood pressure update   7/8/23   116/75  7/9/23    124/78 AM       111/71  PM  7/10/23   122/78    Bp readings at rehab also good. Questioned if antibiotics he was taking could have elevated blood pressure for the five days is was high.

## 2023-07-11 ENCOUNTER — APPOINTMENT (OUTPATIENT)
Dept: LAB | Age: 73
End: 2023-07-11
Payer: MEDICARE

## 2023-07-11 DIAGNOSIS — F51.01 PRIMARY INSOMNIA: ICD-10-CM

## 2023-07-11 DIAGNOSIS — N30.00 ACUTE CYSTITIS WITHOUT HEMATURIA: ICD-10-CM

## 2023-07-11 PROCEDURE — 87086 URINE CULTURE/COLONY COUNT: CPT

## 2023-07-12 ENCOUNTER — CLINICAL SUPPORT (OUTPATIENT)
Dept: CARDIAC REHAB | Age: 73
End: 2023-07-12
Payer: MEDICARE

## 2023-07-12 DIAGNOSIS — I50.22 CHRONIC SYSTOLIC (CONGESTIVE) HEART FAILURE (HCC): ICD-10-CM

## 2023-07-12 LAB — BACTERIA UR CULT: NORMAL

## 2023-07-12 PROCEDURE — 93798 PHYS/QHP OP CAR RHAB W/ECG: CPT

## 2023-07-13 ENCOUNTER — CLINICAL SUPPORT (OUTPATIENT)
Dept: CARDIAC REHAB | Age: 73
End: 2023-07-13
Payer: MEDICARE

## 2023-07-13 DIAGNOSIS — I50.22 CHRONIC SYSTOLIC (CONGESTIVE) HEART FAILURE (HCC): Primary | ICD-10-CM

## 2023-07-13 DIAGNOSIS — N30.00 ACUTE CYSTITIS WITHOUT HEMATURIA: Primary | ICD-10-CM

## 2023-07-13 DIAGNOSIS — N39.0 COMPLICATED UTI (URINARY TRACT INFECTION): Primary | ICD-10-CM

## 2023-07-13 PROCEDURE — 93798 PHYS/QHP OP CAR RHAB W/ECG: CPT

## 2023-07-13 RX ORDER — ZOLPIDEM TARTRATE 5 MG/1
TABLET ORAL
Qty: 30 TABLET | Refills: 0 | OUTPATIENT
Start: 2023-07-13

## 2023-07-13 NOTE — TELEPHONE ENCOUNTER
Phone call to patient. Per Dr. Guerita Hdez, antibiotics probably not cause of elevated BP. Continue to monitor and report any additional concerns. Patient understood and will call office if bp becomes elevated again.

## 2023-07-14 ENCOUNTER — TELEPHONE (OUTPATIENT)
Dept: INFECTIOUS DISEASES | Facility: CLINIC | Age: 73
End: 2023-07-14

## 2023-07-14 NOTE — TELEPHONE ENCOUNTER
Pt calls office and states that he has been waiting to receive a phone call so he can make an appt.      Pt is now schedule for 8/3 w/      Pt verbalizes understanding and gives thanks

## 2023-07-17 ENCOUNTER — CLINICAL SUPPORT (OUTPATIENT)
Dept: CARDIAC REHAB | Age: 73
End: 2023-07-17
Payer: MEDICARE

## 2023-07-17 ENCOUNTER — APPOINTMENT (OUTPATIENT)
Dept: CARDIAC REHAB | Age: 73
End: 2023-07-17
Payer: MEDICARE

## 2023-07-17 DIAGNOSIS — I50.22 CHRONIC SYSTOLIC (CONGESTIVE) HEART FAILURE (HCC): ICD-10-CM

## 2023-07-17 PROCEDURE — 93798 PHYS/QHP OP CAR RHAB W/ECG: CPT

## 2023-07-18 ENCOUNTER — CLINICAL SUPPORT (OUTPATIENT)
Dept: CARDIAC REHAB | Age: 73
End: 2023-07-18
Payer: MEDICARE

## 2023-07-18 DIAGNOSIS — I50.22 CHRONIC SYSTOLIC (CONGESTIVE) HEART FAILURE (HCC): Primary | ICD-10-CM

## 2023-07-18 PROCEDURE — 93798 PHYS/QHP OP CAR RHAB W/ECG: CPT

## 2023-07-19 ENCOUNTER — TELEPHONE (OUTPATIENT)
Dept: PULMONOLOGY | Facility: CLINIC | Age: 73
End: 2023-07-19

## 2023-07-19 ENCOUNTER — TELEPHONE (OUTPATIENT)
Age: 73
End: 2023-07-19

## 2023-07-19 NOTE — TELEPHONE ENCOUNTER
Spoke to patient to reschedule follow up appt with Dr Sarkis Chase in the [facility] office but was unable to do at the moment.  Will call back to reschedule     Dr. Sarkis Chase has Aug 23 and 24

## 2023-07-19 NOTE — TELEPHONE ENCOUNTER
Patients GI provider:  Dr. Shar Bill    Number to return call: [phone]    Reason for call: Pt calling to return a call from our office - no notes in the chart, not sure who called him. Please advise.      Scheduled procedure/appointment date if applicable: N/A

## 2023-07-20 ENCOUNTER — TELEPHONE (OUTPATIENT)
Dept: GASTROENTEROLOGY | Facility: CLINIC | Age: 73
End: 2023-07-20

## 2023-07-20 NOTE — TELEPHONE ENCOUNTER
I returned call to patient I left detailed message advising pt there is no documentation we called pt, I do see last note from Dr. Sandy Aviles 5-11-23        May 11, 2023  Daja Elizondo MD  to Lila Abebe RN        5/11/23  3:56 PM  Spoke with patient. He is going to ask his Cardiologist when it would be safe for him to reschedule and then reschedule his procedure.

## 2023-07-20 NOTE — TELEPHONE ENCOUNTER
Pt returned call in regards to the rescheduling of his appointment. We got him scheduled for 8/24 at 10am and was a bit unhappy as he said well this will probably just be cancelled again. I apologized for the inconvenience and thanked him for rescheduling.

## 2023-07-20 NOTE — TELEPHONE ENCOUNTER
Spoke to patient and clarified that the call he received was about his recall for Dr Ian Cr to f/u I did edit the recall that he will f/u after he has completed all his cardiac appt on 8/30 with Dr Destinee Gutierrez which at that point he will schedule his colonoscopy. He might get a letter in the mail from the call yesterday which we can disregard for now.

## 2023-07-21 ENCOUNTER — APPOINTMENT (OUTPATIENT)
Dept: LAB | Age: 73
End: 2023-07-21
Payer: MEDICARE

## 2023-07-21 ENCOUNTER — RA CDI HCC (OUTPATIENT)
Dept: OTHER | Facility: HOSPITAL | Age: 73
End: 2023-07-21

## 2023-07-21 DIAGNOSIS — N30.00 ACUTE CYSTITIS WITHOUT HEMATURIA: ICD-10-CM

## 2023-07-21 PROCEDURE — 87186 SC STD MICRODIL/AGAR DIL: CPT

## 2023-07-21 PROCEDURE — 87086 URINE CULTURE/COLONY COUNT: CPT

## 2023-07-21 PROCEDURE — 87077 CULTURE AEROBIC IDENTIFY: CPT

## 2023-07-21 NOTE — PROGRESS NOTES
[address] [facility] coding opportunities       Chart reviewed, no opportunity found: CHART REVIEWED, NO OPPORTUNITY FOUND        Patients Insurance

## 2023-07-23 ENCOUNTER — OFFICE VISIT (OUTPATIENT)
Dept: URGENT CARE | Facility: MEDICAL CENTER | Age: 73
End: 2023-07-23
Payer: MEDICARE

## 2023-07-23 VITALS
SYSTOLIC BLOOD PRESSURE: 161 MMHG | DIASTOLIC BLOOD PRESSURE: 76 MMHG | RESPIRATION RATE: 20 BRPM | WEIGHT: 195 LBS | HEIGHT: 68 IN | TEMPERATURE: 99.4 F | BODY MASS INDEX: 29.55 KG/M2 | OXYGEN SATURATION: 98 % | HEART RATE: 92 BPM

## 2023-07-23 DIAGNOSIS — N30.00 ACUTE CYSTITIS WITHOUT HEMATURIA: Primary | ICD-10-CM

## 2023-07-23 LAB — BACTERIA UR CULT: ABNORMAL

## 2023-07-23 PROCEDURE — 99213 OFFICE O/P EST LOW 20 MIN: CPT | Performed by: NURSE PRACTITIONER

## 2023-07-23 PROCEDURE — G0463 HOSPITAL OUTPT CLINIC VISIT: HCPCS | Performed by: NURSE PRACTITIONER

## 2023-07-23 RX ORDER — CEPHALEXIN 500 MG/1
500 CAPSULE ORAL EVERY 6 HOURS SCHEDULED
Qty: 28 CAPSULE | Refills: 0 | Status: SHIPPED | OUTPATIENT
Start: 2023-07-23 | End: 2023-07-30

## 2023-07-23 NOTE — PROGRESS NOTES
[facility]        NAME: Fernando Ybarra is a 81 y.o. male  : 1950    MRN: [id number]  DATE: 2023  TIME: 10:59 AM    Assessment and Plan   Acute cystitis without hematuria [N30.00]  1. Acute cystitis without hematuria  cephalexin (KEFLEX) 500 mg capsule        Reached out to oncall with urology - Isaias Meza for any recommended tx plan for Mr. Tremaine Ann. Saintclair Creighton deferred tx to me to tx based on culture results. Keflex sent to pharmacy -   Pt will f/u with pcp and urology and will get repeat culture after tx completed. Discussed consider eval by ID as he has been getting frequent infections. Patient Instructions     Follow up with PCP in 3-5 days. Proceed to  ER if symptoms worsen. Chief Complaint     Chief Complaint   Patient presents with   • Possible UTI     Patient was recently D/C from the hospital for UTI on . He was seen by his PCP and urine ordered. He received his culture results, and now his results are in and needs treatment. +Echoli positive in his urine. He self caths for urine         History of Present Illness   Fernando Ybarra presents to the clinic c/o    Pt presents to the office for treatment for a positive urine culture that was ordered by urology. States he was having increase in burning end of the week so went to get the culture collected  Pt states he has been through this before and knows he needs keflex and asking for a prescription to be called in. He did not call urology as it is a  and did not want to deal with their on call service. Denies any fever, chills, body aches, confusion. Of note -Possible UTI (Patient was recently D/C from the hospital for UTI on . He was seen by his urologist and urine ordered. He received his culture results, and now his results are in and needs treatment. +Echoli positive in his urine.   He self caths for urine)    Has an appt with his PCP on Friday      Review of Systems   Review of Systems   All other systems reviewed and are negative. Current Medications     Long-Term Medications   Medication Sig Dispense Refill   • ascorbic acid (VITAMIN C) 250 MG CHEW Chew 250 mg 2 (two) times a day     • atorvastatin (LIPITOR) 20 mg tablet TAKE ONE TABLET BY MOUTH ONCE EVERY DAY 90 tablet 3   • busPIRone (BUSPAR) 7.5 mg tablet 1 tablet in the morning and 2 tablets at night 270 tablet 1   • dabigatran etexilate (PRADAXA) 150 mg capsu Take 1 capsule (150 mg total) by mouth 2 (two) times a day 180 capsule 1   • docusate sodium (COLACE) 100 mg capsule Take 1 capsule (100 mg total) by mouth 2 (two) times a day 60 capsule 0   • LORazepam (Ativan) 0.5 mg tablet Take 1 tablet (0.5 mg total) by mouth every 8 (eight) hours as needed for anxiety 60 tablet 1   • losartan (COZAAR) 50 mg tablet Take 1 tablet (50 mg total) by mouth daily 90 tablet 1   • montelukast (SINGULAIR) 10 mg tablet TAKE ONE TABLET BY MOUTH DAILY 90 tablet 3   • nadolol (CORGARD) 80 MG tablet Take 1 tablet (80 mg total) by mouth daily in the early morning (Patient not taking: Reported on 6/30/2023) 90 tablet 3   • omeprazole (PriLOSEC) 40 MG capsule Take one capsule by mouth once every day. 90 capsule 3   • spironolactone (ALDACTONE) 25 mg tablet Take 1 tablet (25 mg total) by mouth daily 90 tablet 1   • vitamin B-12 (CYANOCOBALAMIN) 500 MCG TABS Take 1 tablet (500 mcg total) by mouth daily 30 tablet 0   • zolpidem (AMBIEN) 5 mg tablet Take 1/2-1 full tablet at bedtime as needed for sleep.   Do not take if drinking any alcohol or taking lorazepam 30 tablet 0       Current Allergies     Allergies as of 07/23/2023 - Reviewed 07/23/2023   Allergen Reaction Noted   • Ciprofloxacin Other (See Comments) 06/20/2023   • Codeine Other (See Comments) 11/11/2016   • Sulfamethoxazole-trimethoprim GI Intolerance and Abdominal Pain 11/11/2016            The following portions of the patient's history were reviewed and updated as appropriate: allergies, current medications, past family history, past medical history, past social history, past surgical history and problem list.    Objective   /76   Pulse 92   Temp 99.4 °F (37.4 °C)   Resp 20   Ht 5' 8" (1.727 m)   Wt 88.5 kg (195 lb)   SpO2 98%   BMI 29.65 kg/m²        Physical Exam     Physical Exam  Vitals and nursing note reviewed. Constitutional:       Appearance: Normal appearance. He is well-developed. HENT:      Head: Normocephalic and atraumatic. Eyes:      General: Lids are normal.      Conjunctiva/sclera: Conjunctivae normal.      Pupils: Pupils are equal, round, and reactive to light. Cardiovascular:      Rate and Rhythm: Normal rate and regular rhythm. Pulses: Normal pulses. Heart sounds: Normal heart sounds, S1 normal and S2 normal.   Pulmonary:      Effort: Pulmonary effort is normal.      Breath sounds: Normal breath sounds. Abdominal:      Tenderness: There is no abdominal tenderness. There is no right CVA tenderness. Musculoskeletal:      Cervical back: Normal range of motion and neck supple. Skin:     General: Skin is warm and dry. Neurological:      Mental Status: He is alert. Psychiatric:         Speech: Speech normal.         Behavior: Behavior normal.         Thought Content:  Thought content normal.         Judgment: Judgment normal.

## 2023-07-24 ENCOUNTER — APPOINTMENT (OUTPATIENT)
Dept: CARDIAC REHAB | Age: 73
End: 2023-07-24
Payer: MEDICARE

## 2023-07-25 ENCOUNTER — APPOINTMENT (OUTPATIENT)
Dept: CARDIAC REHAB | Age: 73
End: 2023-07-25
Payer: MEDICARE

## 2023-07-26 ENCOUNTER — TELEPHONE (OUTPATIENT)
Dept: FAMILY MEDICINE CLINIC | Facility: CLINIC | Age: 73
End: 2023-07-26

## 2023-07-26 ENCOUNTER — APPOINTMENT (OUTPATIENT)
Dept: LAB | Age: 73
End: 2023-07-26
Payer: MEDICARE

## 2023-07-26 DIAGNOSIS — N39.0 COMPLICATED UTI (URINARY TRACT INFECTION): Primary | ICD-10-CM

## 2023-07-26 LAB
ALBUMIN SERPL BCP-MCNC: 4.1 G/DL (ref 3.5–5)
ALP SERPL-CCNC: 92 U/L (ref 46–116)
ALT SERPL W P-5'-P-CCNC: 49 U/L (ref 12–78)
ANION GAP SERPL CALCULATED.3IONS-SCNC: 4 MMOL/L
ANISOCYTOSIS BLD QL SMEAR: PRESENT
AST SERPL W P-5'-P-CCNC: 37 U/L (ref 5–45)
BASOPHILS # BLD MANUAL: 0 THOUSAND/UL (ref 0–0.1)
BASOPHILS NFR MAR MANUAL: 0 % (ref 0–1)
BILIRUB SERPL-MCNC: 0.5 MG/DL (ref 0.2–1)
BUN SERPL-MCNC: 15 MG/DL (ref 5–25)
CALCIUM SERPL-MCNC: 9.4 MG/DL (ref 8.3–10.1)
CHLORIDE SERPL-SCNC: 100 MMOL/L (ref 96–108)
CO2 SERPL-SCNC: 29 MMOL/L (ref 21–32)
CREAT SERPL-MCNC: 0.66 MG/DL (ref 0.6–1.3)
EOSINOPHIL # BLD MANUAL: 0 THOUSAND/UL (ref 0–0.4)
EOSINOPHIL NFR BLD MANUAL: 0 % (ref 0–6)
ERYTHROCYTE [DISTWIDTH] IN BLOOD BY AUTOMATED COUNT: 14.5 % (ref 11.6–15.1)
GFR SERPL CREATININE-BSD FRML MDRD: 97 ML/MIN/1.73SQ M
GLUCOSE P FAST SERPL-MCNC: 102 MG/DL (ref 65–99)
HCT VFR BLD AUTO: 37.6 % (ref 36.5–49.3)
HGB BLD-MCNC: 12.5 G/DL (ref 12–17)
LG PLATELETS BLD QL SMEAR: PRESENT
LYMPHOCYTES # BLD AUTO: 32.09 THOUSAND/UL (ref 0.6–4.47)
LYMPHOCYTES # BLD AUTO: 82 % (ref 14–44)
MCH RBC QN AUTO: 33.1 PG (ref 26.8–34.3)
MCHC RBC AUTO-ENTMCNC: 33.2 G/DL (ref 31.4–37.4)
MCV RBC AUTO: 100 FL (ref 82–98)
MONOCYTES # BLD AUTO: 0 THOUSAND/UL (ref 0–1.22)
MONOCYTES NFR BLD: 0 % (ref 4–12)
NEUTROPHILS # BLD MANUAL: 7.04 THOUSAND/UL (ref 1.85–7.62)
NEUTS SEG NFR BLD AUTO: 18 % (ref 43–75)
OVALOCYTES BLD QL SMEAR: PRESENT
PLATELET # BLD AUTO: 183 THOUSANDS/UL (ref 149–390)
PLATELET BLD QL SMEAR: ADEQUATE
PMV BLD AUTO: 11.1 FL (ref 8.9–12.7)
POLYCHROMASIA BLD QL SMEAR: PRESENT
POTASSIUM SERPL-SCNC: 4.9 MMOL/L (ref 3.5–5.3)
PROT SERPL-MCNC: 8.6 G/DL (ref 6.4–8.4)
PSA SERPL-MCNC: 0.14 NG/ML (ref 0–4)
RBC # BLD AUTO: 3.78 MILLION/UL (ref 3.88–5.62)
SMUDGE CELLS BLD QL SMEAR: PRESENT
SODIUM SERPL-SCNC: 133 MMOL/L (ref 135–147)
WBC # BLD AUTO: 39.13 THOUSAND/UL (ref 4.31–10.16)

## 2023-07-26 PROCEDURE — 85007 BL SMEAR W/DIFF WBC COUNT: CPT

## 2023-07-26 PROCEDURE — G0103 PSA SCREENING: HCPCS

## 2023-07-26 PROCEDURE — 85027 COMPLETE CBC AUTOMATED: CPT

## 2023-07-26 PROCEDURE — 80053 COMPREHEN METABOLIC PANEL: CPT

## 2023-07-26 RX ORDER — CEPHALEXIN 500 MG/1
500 CAPSULE ORAL DAILY
Qty: 90 CAPSULE | Refills: 0 | Status: SHIPPED | OUTPATIENT
Start: 2023-07-26 | End: 2023-10-24

## 2023-07-26 NOTE — TELEPHONE ENCOUNTER
Lori Alvarez from [address] [facility] [place] lab called stating CBC is critical CBC.   Please review

## 2023-07-27 ENCOUNTER — TELEPHONE (OUTPATIENT)
Dept: HEMATOLOGY ONCOLOGY | Facility: CLINIC | Age: 73
End: 2023-07-27

## 2023-07-27 ENCOUNTER — APPOINTMENT (OUTPATIENT)
Dept: CARDIAC REHAB | Age: 73
End: 2023-07-27
Payer: MEDICARE

## 2023-07-27 NOTE — TELEPHONE ENCOUNTER
Reviewed labs with patient and Dr. Matti Rebolledo recommendation that no action is needed at this time. Patient aware and appreciative.

## 2023-07-27 NOTE — TELEPHONE ENCOUNTER
reveived vm: Hi, this is [employer]. Date of birth is [birth year]. I'm a patient of Doctor Malie Stone for many years who monitors my CLL. I had blood work taken yesterday for my family doctor, Doctor Ted Balbuena, And to me the results seem like within the realm that Doctor Maile Counts is always happy with. And some reason I saw in my chart that my family doctor reached out to oncology saying how that they're elevated. Well, first of all, I'm fighting a urinary infection. I'm on antibiotics. I don't know if they realize that or Doctor Gr Counts, but I'm a worry wart so I would like him to get back to me, please Or you. Or let me know that I have nothing to worry about. You can reach me at [phone].  Thank you

## 2023-07-28 ENCOUNTER — OFFICE VISIT (OUTPATIENT)
Dept: FAMILY MEDICINE CLINIC | Facility: CLINIC | Age: 73
End: 2023-07-28
Payer: MEDICARE

## 2023-07-28 VITALS
WEIGHT: 197.2 LBS | BODY MASS INDEX: 29.89 KG/M2 | TEMPERATURE: 97.9 F | HEART RATE: 82 BPM | OXYGEN SATURATION: 99 % | SYSTOLIC BLOOD PRESSURE: 134 MMHG | HEIGHT: 68 IN | DIASTOLIC BLOOD PRESSURE: 86 MMHG

## 2023-07-28 DIAGNOSIS — I48.0 PAROXYSMAL A-FIB (HCC): ICD-10-CM

## 2023-07-28 DIAGNOSIS — I45.81 LONG QT SYNDROME TYPE 2: ICD-10-CM

## 2023-07-28 DIAGNOSIS — I27.20 PULMONARY HYPERTENSION (HCC): Chronic | ICD-10-CM

## 2023-07-28 DIAGNOSIS — F33.9 DEPRESSION, RECURRENT (HCC): ICD-10-CM

## 2023-07-28 DIAGNOSIS — E78.00 HYPERCHOLESTEROLEMIA: Chronic | ICD-10-CM

## 2023-07-28 DIAGNOSIS — Z78.9 ALCOHOL USE: ICD-10-CM

## 2023-07-28 DIAGNOSIS — D59.10 AUTOIMMUNE HEMOLYTIC ANEMIA (HCC): ICD-10-CM

## 2023-07-28 DIAGNOSIS — I50.42 CHRONIC COMBINED SYSTOLIC AND DIASTOLIC CONGESTIVE HEART FAILURE (HCC): ICD-10-CM

## 2023-07-28 DIAGNOSIS — K21.9 GASTROESOPHAGEAL REFLUX DISEASE WITHOUT ESOPHAGITIS: Chronic | ICD-10-CM

## 2023-07-28 DIAGNOSIS — C91.10 CLL (CHRONIC LYMPHOCYTIC LEUKEMIA) (HCC): Chronic | ICD-10-CM

## 2023-07-28 DIAGNOSIS — F32.A ANXIETY AND DEPRESSION: ICD-10-CM

## 2023-07-28 DIAGNOSIS — I10 ESSENTIAL HYPERTENSION: Primary | Chronic | ICD-10-CM

## 2023-07-28 DIAGNOSIS — N39.0 COMPLICATED UTI (URINARY TRACT INFECTION): ICD-10-CM

## 2023-07-28 DIAGNOSIS — F41.9 ANXIETY AND DEPRESSION: ICD-10-CM

## 2023-07-28 DIAGNOSIS — I49.01 VENTRICULAR FIBRILLATION (HCC): ICD-10-CM

## 2023-07-28 DIAGNOSIS — D50.9 IRON DEFICIENCY ANEMIA, UNSPECIFIED IRON DEFICIENCY ANEMIA TYPE: Chronic | ICD-10-CM

## 2023-07-28 DIAGNOSIS — I47.1 NARROW COMPLEX TACHYCARDIA (HCC): ICD-10-CM

## 2023-07-28 PROCEDURE — 99214 OFFICE O/P EST MOD 30 MIN: CPT | Performed by: FAMILY MEDICINE

## 2023-07-28 RX ORDER — BUSPIRONE HYDROCHLORIDE 7.5 MG/1
TABLET ORAL
Qty: 270 TABLET | Refills: 1 | Status: SHIPPED
Start: 2023-07-28

## 2023-07-28 NOTE — ASSESSMENT & PLAN NOTE
He is having recurrent UTIs. This is likely secondary to self-catheterization. He is seeing infectious disease as per urology.

## 2023-07-28 NOTE — ASSESSMENT & PLAN NOTE
Wt Readings from Last 3 Encounters:   07/28/23 89.4 kg (197 lb 3.2 oz)   07/23/23 88.5 kg (195 lb)   07/17/23 88.5 kg (195 lb)   This appears to be stable.

## 2023-07-28 NOTE — ASSESSMENT & PLAN NOTE
Unfortunately, we cannot have him on SSRIs anymore with his QT prolongation. He does have some intermittent depression with anxiety and insomnia. Continue with buspirone at this time. He was understanding of my hesitance to treat him with any further sedating medications at bedtime.

## 2023-07-28 NOTE — PROGRESS NOTES
Assessment/Plan:       Problem List Items Addressed This Visit        Digestive    Gastroesophageal reflux disease without esophagitis     Reflux stable at this time            Cardiovascular and Mediastinum    Essential hypertension - Primary (Chronic)     Blood pressure is controlled today. Pulmonary hypertension (HCC) (Chronic)     Symptoms are stable. Ventricular fibrillation ([address])     He is in sinus rhythm today. Paroxysmal A-fib (HCC)     Continue current regimen including Pradaxa and rate limiting medications. Long QT syndrome type 2     Careful to dose medications appropriately with his history of QT syndrome. Chronic combined systolic and diastolic congestive heart failure (HCC)     Wt Readings from Last 3 Encounters:   07/28/23 89.4 kg (197 lb 3.2 oz)   07/23/23 88.5 kg (195 lb)   07/17/23 88.5 kg (195 lb)   This appears to be stable. Genitourinary    Complicated UTI (urinary tract infection)     He is having recurrent UTIs. This is likely secondary to self-catheterization. He is seeing infectious disease as per urology. Other    CLL (chronic lymphocytic leukemia) (HCC) (Chronic)     Recent white count did elevate this 38,000. Hematology felt this was okay at this time. Continue routine follow-up. Iron deficiency anemia (Chronic)    Hypercholesterolemia (Chronic)    Depression, recurrent (HCC)     Unfortunately, we cannot have him on SSRIs anymore with his QT prolongation. He does have some intermittent depression with anxiety and insomnia. Continue with buspirone at this time. He was understanding of my hesitance to treat him with any further sedating medications at bedtime. Relevant Medications    busPIRone (BUSPAR) 7.5 mg tablet    Autoimmune hemolytic anemia ([address])     Continue close follow-up with hematology.          Alcohol use    Narrow complex tachycardia (HCC)    RESOLVED: Anxiety and depression    Relevant Medications    busPIRone (BUSPAR) 7.5 mg tablet         Subjective:      Patient ID: Raj Zapata is a 81 y.o. male. Patient presents today for follow-up of chronic health issues. He seems to be doing relatively well status post his recent MI. He remains quite anxious about his overall health. He is having insomnia. He has been very demanding for sleep aids etc., not really understanding the risk involved in multiple sedating medications. He feels intermittently depressed. He has cut back very much on alcohol which he feels leads to some of his anxiety depression as he did enjoy going out with his friends for drinks pretty much daily prior to his MI. He has a history of significant urinary retention and required self-catheterization. He has a history of multiple UTIs and is now seeing infectious disease to help with a plan moving forward.       The following portions of the patient's history were reviewed and updated as appropriate: allergies, current medications, past family history, past medical history, past social history, past surgical history and problem list.      Current Outpatient Medications:   •  Acetaminophen 500 MG, Take 1,000 mg by mouth prn, Disp: , Rfl:   •  ascorbic acid (VITAMIN C) 250 MG CHEW, Chew 250 mg 2 (two) times a day, Disp: , Rfl:   •  atorvastatin (LIPITOR) 20 mg tablet, TAKE ONE TABLET BY MOUTH ONCE EVERY DAY, Disp: 90 tablet, Rfl: 3  •  Blood Pressure Monitoring (Omron 5 Series BP Monitor) PEE, , Disp: , Rfl:   •  busPIRone (BUSPAR) 7.5 mg tablet, 1 tablet in the morning and 1 at night, Disp: 270 tablet, Rfl: 1  •  cephalexin (KEFLEX) 500 mg capsule, Take 1 capsule (500 mg total) by mouth every 6 (six) hours for 7 days, Disp: 28 capsule, Rfl: 0  •  cephalexin (KEFLEX) 500 mg capsule, Take 1 capsule (500 mg total) by mouth in the morning, Disp: 90 capsule, Rfl: 0  •  dabigatran etexilate (PRADAXA) 150 mg capsu, Take 1 capsule (150 mg total) by mouth 2 (two) times a day, Disp: 180 capsule, Rfl: 1  •  docusate sodium (COLACE) 100 mg capsule, Take 1 capsule (100 mg total) by mouth 2 (two) times a day, Disp: 60 capsule, Rfl: 0  •  Lactobacillus (PROBIOTIC ACIDOPHILUS PO), Take by mouth, Disp: , Rfl:   •  LORazepam (Ativan) 0.5 mg tablet, Take 1 tablet (0.5 mg total) by mouth every 8 (eight) hours as needed for anxiety, Disp: 60 tablet, Rfl: 1  •  losartan (COZAAR) 50 mg tablet, Take 1 tablet (50 mg total) by mouth daily, Disp: 90 tablet, Rfl: 1  •  montelukast (SINGULAIR) 10 mg tablet, TAKE ONE TABLET BY MOUTH DAILY, Disp: 90 tablet, Rfl: 3  •  nadolol (CORGARD) 80 MG tablet, Take 1 tablet (80 mg total) by mouth daily in the early morning, Disp: 90 tablet, Rfl: 3  •  nitrofurantoin (MACROBID) 100 mg capsule, Take 1 capsule (100 mg total) by mouth in the morning, Disp: 90 capsule, Rfl: 2  •  Omega-3 Fatty Acids (FISH OIL) 1,000 mg, Take by mouth daily, Disp: , Rfl:   •  omeprazole (PriLOSEC) 40 MG capsule, Take one capsule by mouth once every day., Disp: 90 capsule, Rfl: 3  •  spironolactone (ALDACTONE) 25 mg tablet, Take 1 tablet (25 mg total) by mouth daily, Disp: 90 tablet, Rfl: 1  •  zolpidem (AMBIEN) 5 mg tablet, Take 1/2-1 full tablet at bedtime as needed for sleep. Do not take if drinking any alcohol or taking lorazepam, Disp: 30 tablet, Rfl: 0  •  vitamin B-12 (CYANOCOBALAMIN) 500 MCG TABS, Take 1 tablet (500 mcg total) by mouth daily, Disp: 30 tablet, Rfl: 0     Review of Systems   Constitutional: Negative for appetite change, chills, fatigue, fever and unexpected weight change. HENT: Negative for trouble swallowing. Eyes: Negative for visual disturbance. Respiratory: Negative for cough, chest tightness, shortness of breath and wheezing. Cardiovascular: Negative for chest pain, palpitations and leg swelling. Gastrointestinal: Negative for abdominal distention, abdominal pain, blood in stool, constipation and diarrhea. Endocrine: Negative for polyuria. Genitourinary: Negative for difficulty urinating and flank pain. Musculoskeletal: Negative for arthralgias and myalgias. Skin: Negative for rash. Neurological: Negative for dizziness and light-headedness. Hematological: Negative for adenopathy. Does not bruise/bleed easily. Psychiatric/Behavioral: Positive for sleep disturbance. Negative for dysphoric mood. The patient is nervous/anxious. Objective:      /86 (BP Location: Left arm, Patient Position: Sitting, Cuff Size: Standard)   Pulse 82   Temp 97.9 °F (36.6 °C) (Tympanic)   Ht 5' 8" (1.727 m)   Wt 89.4 kg (197 lb 3.2 oz)   SpO2 99%   BMI 29.98 kg/m²          Physical Exam  Vitals reviewed. Constitutional:       Appearance: He is well-developed. He is obese. HENT:      Head: Normocephalic. Cardiovascular:      Rate and Rhythm: Regular rhythm. Heart sounds: Normal heart sounds. No murmur heard. Pulmonary:      Effort: No respiratory distress. Breath sounds: No wheezing or rales. Abdominal:      General: There is no distension. Tenderness: There is no abdominal tenderness. Skin:     Findings: No erythema or rash. Neurological:      Mental Status: He is alert and oriented to person, place, and time.            Leia Madsen MD

## 2023-07-28 NOTE — ASSESSMENT & PLAN NOTE
Recent white count did elevate this 38,000. Hematology felt this was okay at this time. Continue routine follow-up.

## 2023-07-31 ENCOUNTER — APPOINTMENT (OUTPATIENT)
Dept: LAB | Age: 73
End: 2023-07-31
Payer: MEDICARE

## 2023-07-31 ENCOUNTER — CLINICAL SUPPORT (OUTPATIENT)
Dept: CARDIAC REHAB | Age: 73
End: 2023-07-31
Payer: MEDICARE

## 2023-07-31 DIAGNOSIS — I50.22 CHRONIC SYSTOLIC HEART FAILURE (HCC): ICD-10-CM

## 2023-07-31 DIAGNOSIS — N30.01 ACUTE CYSTITIS WITH HEMATURIA: ICD-10-CM

## 2023-07-31 DIAGNOSIS — N30.00 ACUTE CYSTITIS WITHOUT HEMATURIA: ICD-10-CM

## 2023-07-31 LAB

## 2023-07-31 PROCEDURE — 81001 URINALYSIS AUTO W/SCOPE: CPT

## 2023-07-31 PROCEDURE — 93798 PHYS/QHP OP CAR RHAB W/ECG: CPT

## 2023-07-31 PROCEDURE — 87086 URINE CULTURE/COLONY COUNT: CPT

## 2023-07-31 NOTE — PROGRESS NOTES
Cardiac Rehabilitation Plan of Care   120 Day Reassessment          Today's date: 2023   # of Exercise Sessions Completed: 32  Patient name: Rajan Gil      : 1950  Age: 81 y.o. MRN: [id number]  Referring Physician: Rhina Deutsch DO  Cardiologist: Dr. Eugene Damico  Provider: Serena Ortiz  Clinician: Christine Krishna, MS, CEP, CCRP        Dx:   Encounter Diagnosis   Name Primary? • Chronic systolic heart failure ([address])      Date of onset: 3/12/23      SUMMARY OF PROGRESS:  23:   Lux Moffett has missed a few exercise sessions in the past 30 days with an infection. He completed 5 sessions since his last care plan on July 10. He tolerates 40-45 mins at 3.0-3.3 METs. A light strength training component has been added to their exercise program.  He is tolerating progression of intensity levels to maintain RPE 4-6. Resting BP  110/78 - 120/70 with Normal/flat response to exercise: 112/70 - 120/70. Paced rhythm on tele. RHR 71-81 with Normal response to exercise reaching  92-65. He has not added home exercise d/t walking limitation from hip pain. He reports increased strength and stamina and doesn't get as winded. He says he has more motivation for activity and feels good when he leaves. No cardiac complaints. He is progressing toward wt loss goals with a loss of 3 pounds. Lux Moffett stopped daily weights at home since they were always steady. Lux Moffett has been working on  dietary modifications with the goal of rare red/processed meats, low fat dairy, reduced added sugars and refined flours. He has returned to drinking alcohol in moderation now limiting his intake to 2 beers or drinking NA beer. States he is abstaining from marijuana. The patient is a former smoker (quit 43 years ago). Depression was not reassessed. He admits to anxiety and sees a psychologist regularly as well as buspar. Patient reports excellent  social/emotional support from his cousin and close friend.   Lux Moffett rates their stress as 7/10 related to his wife's cognitive issues and serving as a caregiver for her. Stress management techniques were reinforced. He has been spending more time with friends and attended a concert. Patient attends group educational classes on cardiac risk factor modification. His exercise program will be progressed as tolerated to maintain RPE 4-6. The patient has the following personal goals he hopes to achieved by discharge: Lose weight, regular exercise routine. They will continue to be educated on lifestyle modification and encouraged to supplement with a home exercise program as tolerated to reach the following goals in the next 30 days: increased workloads, attempt home walking as tolerated by back pain, focus on stress management and healthy eating for wt loss. Ingris Alfaro has 4 monitored sessions remaining. He plans to join [employer].      7/10/23: Pt was not available for discussion at the time this note is being written. Ingris Alfaro is compliant attending cardiac rehab exercise sessions 3x/wk. He tolerates 40-45 mins at 3.0-3.3 METs. A light strength training component has been added to their exercise program.  He just returned from being absent for a 1 week d/t hospitalization for UTI. He is tolerating progression of intensity levels to maintain RPE 4-6. Resting BP  118//72 with Normal/flat response to exercise: 128/72. Paced rhythm on tele. RHR 75-87 with Normal response to exercise reaching . He has not added home exercise d/t walking limitation from hip pain. He reports increased strength and stamina after starting regular exercise at [facility]. He says he has more motivation for activity and feels good when he leaves. No cardiac complaints. He is progressing toward wt loss goals with a loss of 1 pounds. Ingris Alfaro stopped daily weights at home since they were always steady.  Ingris Alfaro has been working on  dietary modifications with the goal of rare red/processed meats, low fat dairy, reduced added sugars and refined flours. He has returned to drinking alcohol in moderation. States he is abstaining from marijuana. The patient is a former smoker (quit 43 years ago). Depression was not reassessed. He admits to anxiety and sees a psychologist 2x/month as well as buspar. Patient reports excellent  social/emotional support from his cousin and close friend. Rashid Wright rates their stress as 7/10 related to his wife's health issues and serving as a caregiver for her. Stress management techniques were reinforced. Patient attends group educational classes on cardiac risk factor modification. His exercise program will be progressed as tolerated to maintain RPE 4-6. The patient has the following personal goals he hopes to achieved by discharge: Lose weight, regular exercise routine. They will continue to be educated on lifestyle modification and encouraged to supplement with a home exercise program as tolerated to reach the following goals in the next 30 days: increased workloads, attempt home walking as tolerated by back pain, focus on stress management and healthy eating for wt loss, find gym to join.       Medication compliance: Yes   Comments: Pt reports to be compliant with medications  Fall Risk: Low   Comments: Ambulates with a steady gait with no assist device and Denies a fall in the past 6 months    EKG Interpretation: Paced      EXERCISE ASSESSMENT and PLAN    Exercise Prescription:      Frequency: 3 days/week   Supplement with home exercise 2+ days/wk as tolerated       Minutes: 40        METS: 3.0-3.3           HR: RHR+30   RPE: 4-6         Modalities: Treadmill, UBE, NuStep and Recumbent bike      30 Day Goals for Exercise Progression:    Frequency: 3 days/week of cardiac rehab       Supplement with home exercise 2+ days/wk as tolerated    Minutes: 40                              >150 mins/wk of moderate intensity exercise   METS: 3.3-3.5   HR: RHR+30bpm    RPE: 4-6   Modalities: Treadmill, UBE, [employer], NuStep and Recumbent bike    Strength trainin-3 days / week  12-15 repetitions  1-2 sets per modality    Modalities: Leg Press, Chest Press, Pull Downs and Lateral Raise    Home Exercise: none    Goals: 10% improvement in functional capacity - based on max METs achieved in fitness assessment, improved DASI score by 10%, Resume ADLs with increased strength, Attend Rehab regularly and start a home exercise program    Progression Toward Goals:  Pt is progressing and showing improvement  toward the following goals:  10% improvement in functional capacity - based on max METs achieved in fitness assessment, improved DASI score by 10%, Resume ADLs with increased strength, Attend Rehab regularly and start a home exercise program.  , Pt has not made progress toward the following goals: home exercise. , Lux Moffett has not added home exercise    Education: home exercise guidelines, AHA guidelines to achieve >150 mins/wk of moderate exercise, RPE scale, physical activity/exercise in extreme weather conditions and Long QT   Plan:education on home exercise guidelines and home exercise 30+ mins 2 days opposite CR  Readiness to change: Preparation:  (Getting ready to change)  and Action:  (Changing behavior)      NUTRITION ASSESSMENT AND PLAN    Weight control:    Starting weight: 191   Current weight:   188    Lipid management: Last lipid profile 23  Chol 118    HDL 31  LDL 63    Goals:Improved Rate Your Plate score  >61, choose lean meat (93-95%), reduce portion sizes of meat to 3oz or less, Eat 4-5 cups of fruits and vegetables daily, eliminate or choose low-fat sweets and weight loss    Measurable goals were based Rate Your Plate Dietary Self-Assessment. These are the areas in which the patient could score higher on the assessment. Goals include recommendations for a heart healthy diet based on American Heart Association.     Progression Toward Goals: Pt is progressing and showing improvement  toward the following goals:  Improved Rate Your Plate score  >17, choose lean meat (93-95%), reduce portion sizes of meat to 3oz or less, Eat 4-5 cups of fruits and vegetables daily, eliminate or choose low-fat sweets and weight loss.   , Patient will eliminate soda, follow low sodium diet, increase fish intake in the next 30 days, he has abstained from marijuana and drinking in moderation,  Ryan Torres has been using his air fryer and eating more fish, less processed meats    Education: heart healthy eating  low sodium diet  hydration  nutrition for  lipid management  wt. loss   portion control  education class:  Label Reading  Plan: Education Class: Heart Healthy Eating, reduce portion sizes, eat fewer desserts and sweets, avoid processed foods, will replace sugar sweetened cereals with whole grain or oatmeal and abstain from alcohol  Readiness to change: Action:  (Changing behavior)      PSYCHOSOCIAL ASSESSMENT AND PLAN    Emotional:  Depression assessment:  PHQ-9 = 8  5-9 = Mild Depression            Anxiety measure:  ANNALISE-7 = 13  10-14 = Moderate anxiety  Self-reported stress level:  6  Social support: Patient reports excellent emotional/social support from psychologist, cousin and Patient has friends available for support when needed     Goals:  Reduce perceived stress to 1-3/10, PHQ-9 - reduced severity by one level, follow up with therapist, Feelings in [place] Score < 3, Physical Fitness in [place] Score < 3, Daily Activity in [place] Score < 3, Social Activities in [place] Score < 3, Overall Health in [place] Score < 3, Quality of Life in [place] Score < 3 , improved sleep, improved positive thoughts of well being, increased energy, stop worrying, Feel less anxious and Handle anger/frustration better    Progression Toward Goals: Pt has not made progress toward the following goals: Reduce perceived stress to 1-3/10, PHQ-9 - reduced severity by one level, follow up with therapist, Feelings in [place] Score < 3, Physical Fitness in [facility] Score < 3, Daily Activity in [facility] Score < 3, Social Activities in [facility] Score < 3, Overall Health in [facility] Score < 3, Quality of Life in [facility] Score < 3 , improved sleep, improved positive thoughts of well being, increased energy, stop worrying, Feel less anxious and Handle anger/frustration better. , Patient will f/u with psychotherapist, take buspar as needed in the next 30 days, Will continue to educate and progress as tolerated., He socializes at the club. , will meet up with old friends for dinners once monthly    Education: benefits of a positive support system, stress management techniques, class:  Stress and Your Health  and class:  Relaxation  Plan: PHQ-9 >5 will refer to MD, Refer to [employer], Exercise, Join a support group  and maintain visits with therapist, practice meditation and hang out with friends  Readiness to change: Action:  (Changing behavior)      OTHER CORE COMPONENTS     Tobacco:   Social History     Tobacco Use   Smoking Status Former   • Packs/day: 1.00   • Years: 15.00   • Total pack years: 15.00   • Types: Cigarettes   • Start date: 67   • Quit date: 1980   • Years since quittin.9   Smokeless Tobacco Never       Tobacco Use Intervention:   Pt quit many years ago   and has abstained    Anginal Symptoms:  None   NTG use: No prescription    Blood pressure:    Restin/78 - 120/70   Exercise: 112/70 - 120/70    Goals: moderate intensity exercise >150 mins/wk, medication compliance, Abstain from smoking and abstain from marijuana    Progression Toward Goals: Pt is progressing and showing improvement  toward the following goals:  moderate intensity exercise >150 mins/wk, medication compliance, Abstain from smoking and abstain from marijuana. , Pt has not made progress toward the following goals: home exercise. , Will continue to educate and progress as tolerated.     Education:  low sodium diet and HTN, Education class: Understanding Heart Disease, smoking and heart disease, relapse education, smoking cessation and effecs of alcohol/MJ   Plan: Class: Common Heart Medications, Complete abstention from smoking and engage in regular exercise  Readiness to change: Action:  (Changing behavior)

## 2023-08-01 ENCOUNTER — TELEPHONE (OUTPATIENT)
Dept: UROLOGY | Facility: CLINIC | Age: 73
End: 2023-08-01

## 2023-08-01 LAB — BACTERIA UR CULT: NORMAL

## 2023-08-01 NOTE — TELEPHONE ENCOUNTER
----- Message from Wing Kishan MD sent at 7/26/2023  2:20 PM EDT -----  Agree with Keflex based on Ucx. Then would go to 500 mg Keflex daily. I am in the OR today with a robot. Thank you for working on this. FT    ----- Message -----  From: Estefanía Vazquez RN  Sent: 7/26/2023   9:28 AM EDT  To: Wing Kishan MD    FYI  ----- Message -----  From: Donovan Rodriguez PA-C  Sent: 7/25/2023  10:09 PM EDT  To: [facility]    FT patient seen at urgent care over the weekend. On keflex. Current e.coli sensitive to same. Defer to FT if anything else needed, saw him last week.  I think good for now

## 2023-08-03 ENCOUNTER — CLINICAL SUPPORT (OUTPATIENT)
Dept: CARDIAC REHAB | Age: 73
End: 2023-08-03
Payer: MEDICARE

## 2023-08-03 ENCOUNTER — CONSULT (OUTPATIENT)
Dept: INFECTIOUS DISEASES | Facility: CLINIC | Age: 73
End: 2023-08-03
Payer: MEDICARE

## 2023-08-03 VITALS
HEART RATE: 81 BPM | HEIGHT: 68 IN | OXYGEN SATURATION: 98 % | WEIGHT: 191 LBS | BODY MASS INDEX: 28.95 KG/M2 | RESPIRATION RATE: 17 BRPM | TEMPERATURE: 96.1 F | DIASTOLIC BLOOD PRESSURE: 68 MMHG | SYSTOLIC BLOOD PRESSURE: 112 MMHG

## 2023-08-03 DIAGNOSIS — N39.0 RECURRENT UTI: Primary | ICD-10-CM

## 2023-08-03 DIAGNOSIS — I50.22 CHRONIC SYSTOLIC HEART FAILURE (HCC): Primary | ICD-10-CM

## 2023-08-03 DIAGNOSIS — N40.0 BENIGN PROSTATIC HYPERPLASIA, UNSPECIFIED WHETHER LOWER URINARY TRACT SYMPTOMS PRESENT: ICD-10-CM

## 2023-08-03 DIAGNOSIS — R33.9 URINARY RETENTION: ICD-10-CM

## 2023-08-03 PROCEDURE — 93798 PHYS/QHP OP CAR RHAB W/ECG: CPT

## 2023-08-03 PROCEDURE — 99204 OFFICE O/P NEW MOD 45 MIN: CPT | Performed by: INTERNAL MEDICINE

## 2023-08-03 NOTE — PROGRESS NOTES
Consultation - Infectious Disease   Sandeep Zaragoza 81 y.o. male MRN: [id number]  Unit/Bed#:  Encounter: [id number]      IMPRESSION & RECOMMENDATIONS:   Impression/Recommendations: This is a 81 y.o. male, with history of BPH, status post TURP x2 and history of resection of bladder diverticula with urine retention requiring self-catheterization, has been treated with multiple antibiotic courses for presumptive recurrent UTI. 1.  Recurrent UTIs. It is unclear how many of these recurrent UTIs were true clinical UTI versus asymptomatic bacteriuria. Per discussion with patient, it appears that he had symptoms only with a few of these treatment courses. Most of the time, it is either due to positive urine culture or urine with odor. I discussed with the patient and his wife in great detail. We reviewed that UTI is a clinical diagnosis, usually with persistent urinary symptoms, suprapubic abdominal pain unexplained fever/chills. We also reviewed that bacteriuria, even with heavy growth, abnormal UA, abnormal odor or not signs of UTI, without clinical symptoms and findings above. Rather, they only represent bladder colonization. We also reviewed that due to urinary retention and catheterization, bladder colonization will be very common in patient. Another clue for bladder colonization is growth of more than 1 bacteria in urine culture. I recommend that patient not be treated for UTI unless he has clinical signs and symptoms of UTI. If findings are unclear for UTI, as long as patient is clinically well, it would be best to observe him for a day or 2 without treatment to see whether these findings worsen. If he indeed has early UTI, these findings should worsen over time without treatment. If patient has dysuria with catheterization, without any other symptoms, this symptom can also be observed since it can be secondary to trauma from catheterization rather than UTI.   This way, we can avoid unnecessary antibiotic courses, thus avoiding antibiotic toxicities and development resistance. At present, patient has no clinical signs of active UTI. No antibiotic indicated at this time. Recommend not checking urine culture unless patient has clinical signs of UTI. At this point, no indication for antibiotic suppression. 2.  Urinary retention, requiring self-catheterizations. Continue self-catheterization. Urology follow-up. 3.  BPH, status post TURP x2. Urology follow-up. 4.  CLL, with stable counts and not on treatment. Hematology/oncology following. 5.  Nonischemic CM and long QT syndrome, status post ICD placement. Cardiology follow-up. Outpatient records reviewed in detail. Discussed with patient and his wife in detail regarding the above plan. Follow-up with us as needed. Thank you for this consultation. HISTORY OF PRESENT ILLNESS:  Reason for Consult: Recurrent UTI.    HPI: Laverne Akhtar is a 81 y.o. male, with history of BPH, status post TURP x2, history of resection of bladder diverticula, urinary retention requiring self-catheterization and CLL, referred by his urologist and PCP for evaluation of recurrent UTI. Patient states that for the last 4 years, he has received multiple antibiotic courses for presumptive UTI. During the last 12 months, he has had 4 episodes. Patient states that he was given antibiotic for either positive urine culture or bad odor in urine. He reports 1 episode of admission for sepsis secondary to UTI. Patient had fever and chills with this episode of sepsis secondary to UTI. Patient was last treated with amoxicillin and cefdinir for urine culture growing E. coli and Enterococcus. Patient also reports intermittent dysuria with catheterization. Most of these dysuria episodes resolve spontaneously, with no further symptoms by the next day. Patient self caths for a few years.   Patient had been on Macrobid for suppression, without change in frequency of "UTI."    At present, patient feels well. He has no fever or chills. He has no abdominal discomfort. He has no dysuria. With regards to patient's CLL, his counts have been stable. He is being followed by hematology and not on treatment. REVIEW OF SYSTEMS:  A complete system-based review of systems was done. Except for what is noted in HPI above, ROS is otherwise negative. PAST MEDICAL HISTORY:  Past Medical History:   Diagnosis Date   • Anxiety    • BPH (benign prostatic hypertrophy)    • Cancer (HCC)     CLL   • CLL (chronic lymphocytic leukemia) ([address])     2009   • Colon polyp    • Concussion     Resolved: 08/22/16   • Depression    • Diverticulitis 01/13/2020 2014 CT done 11/19 12/19 CT done    • E coli bacteremia 06/27/2021 2/2 blood cultures with Chace Nails appears to be the urine    • Epididymitis 05/19/2021 5/2021   • Gastrointestinal hemorrhage     Last assessed: 08/27/13   • GERD (gastroesophageal reflux disease)    • H/O vitamin D deficiency 07/07/2021   • Hearing loss of aging    • Hiatal hernia    • History of right hip replacement 04/19/2021   • History of transfusion    • Hyperlipidemia    • Hypertension    • Hyponatremia 03/06/2023   • Iron deficiency anemia    • Microscopic hematuria     Last assessed: 06/28/13   • OA (osteoarthritis)     right hip   • Obesity (BMI 30.0-34.9) 04/07/2022   • Pneumothorax    • Primary osteoarthritis of right hip 09/29/2017    He is status post right hip arthroplasty   • Prostatitis    • Pulmonary hypertension ([address])    • QT prolongation    • Seasonal allergies    • Sepsis ([address]) 03/06/2023   • Unresponsive episode 03/07/2023   • Urinary tract infection associated with catheterization of urinary tract ([address]) 02/05/2021    Pseudomonal UTI asymptomatic. Per Urology do not treat at this time. • Urinary tract infection associated with catheterization of urinary tract ([address]) 02/05/2021    Pseudomonal UTI asymptomatic.   Per Urology do not treat at this time. He did have urosepsis from E coli    • UTI (urinary tract infection)     in past   • Wears glasses      Past Surgical History:   Procedure Laterality Date   • BLADDER SURGERY     • CARDIAC CATHETERIZATION Left 3/7/2023    Procedure: Cardiac Left Heart Cath;  Surgeon: Li Mehta MD;  Location: [facility]; Service: Cardiology   • CARDIAC CATHETERIZATION N/A 3/7/2023    Procedure: Cardiac temporary pacemaker;  Surgeon: Li Mehta MD;  Location: [facility]; Service: Cardiology   • CARDIAC ELECTROPHYSIOLOGY PROCEDURE N/A 3/8/2023    Procedure: Cardiac icd implant;  Surgeon: Avis Hendricks MD;  [facility]: [facility]; Service: Cardiology   • COLONOSCOPY     • CYSTOSCOPY  10/09/2014    Diagnostic   • CYSTOSCOPY  2020   • FL CYSTOGRAM  08/15/2022   • FRACTURE SURGERY      left lower arm   • FRACTURE SURGERY      left femur   • HERNIA REPAIR     • JOINT REPLACEMENT Right     hip   • OTHER SURGICAL HISTORY  2011    Spinal anesthesia epidural   • GA ARTHRP ACETBLR/PROX FEM PROSTC AGRFT/ALGRFT Right 2017    Procedure: ARTHROPLASTY HIP TOTAL ANTERIOR;  Surgeon: Kasandra Kilgore MD;  Location: [facility];  Service: Orthopedics   • TONSILLECTOMY AND ADENOIDECTOMY     • TRANSURETHRAL RESECTION OF PROSTATE      x 2   • WRIST SURGERY       Problem list reviewed.     FAMILY HISTORY:  Non-contributory    SOCIAL HISTORY:  Social History     Substance and Sexual Activity   Alcohol Use Not Currently   • Alcohol/week: 10.0 standard drinks of alcohol   • Types: 10 Cans of beer per week    Comment: no more drinking     Social History     Substance and Sexual Activity   Drug Use Not Currently   • Types: Marijuana    Comment: "medical marijuana"     Social History     Tobacco Use   Smoking Status Former   • Packs/day: 1.00   • Years: 15.00   • Total pack years: 15.00   • Types: Cigarettes   • Start date: 67   • Quit date: 1980   • Years since quittin.9   Smokeless Tobacco Never       ALLERGIES:  Allergies   Allergen Reactions   • Ciprofloxacin Other (See Comments)     LONG QT syndrome   • Codeine Other (See Comments)     agitated   • Sulfamethoxazole-Trimethoprim GI Intolerance and Abdominal Pain     upset stomach       MEDICATIONS:  All current active medications have been reviewed. Patient is currently not on any antibiotic. PHYSICAL EXAM:  Vitals:   Temperature: (!) 96.1 °F (35.6 °C)     Physical Exam:  General:  Well-nourished, well-developed, in no acute distress. Awake, alert and oriented x 3. Eyes:  Conjunctive clear with no hemorrhages or effusions  Oropharynx:  No ulcers, no lesions, pharynx benign, no tonsillitis  Neck:  Supple, no lymphadenopathy, no mass, nontender  Lungs:  Expansion symmetric, no rales, no wheezing, no accessory muscle use  Cardiac:  Regular rate and rhythm, normal S1, normal S2, no murmurs  Abdomen:  Soft, nondistended, non-tender, no HSM  Extremities:  No edema, no erythema, nontender. No ulcers  Skin:  No rashes, no ulcers  Neurological:  Moves all four extremities spontaneously, sensation grossly intact    LABS, IMAGING, & OTHER STUDIES:  Lab Results:  I have personally reviewed pertinent labs. Invalid input(s): "ALBUMIN"      Results from last 7 days   Lab Units 07/31/23  0840   URINE CULTURE  No Growth <1000 cfu/mL       Imaging Studies:   I have personally reviewed pertinent imaging study reports and images in PACS. EKG, Pathology, and Other Studies:   I have personally reviewed pertinent reports.

## 2023-08-07 ENCOUNTER — CLINICAL SUPPORT (OUTPATIENT)
Dept: CARDIAC REHAB | Age: 73
End: 2023-08-07
Payer: MEDICARE

## 2023-08-07 DIAGNOSIS — I50.22 CHRONIC SYSTOLIC HEART FAILURE (HCC): ICD-10-CM

## 2023-08-07 PROCEDURE — 93798 PHYS/QHP OP CAR RHAB W/ECG: CPT

## 2023-08-08 ENCOUNTER — APPOINTMENT (OUTPATIENT)
Dept: CARDIAC REHAB | Age: 73
End: 2023-08-08
Payer: MEDICARE

## 2023-08-10 ENCOUNTER — CLINICAL SUPPORT (OUTPATIENT)
Dept: CARDIAC REHAB | Age: 73
End: 2023-08-10
Payer: MEDICARE

## 2023-08-10 DIAGNOSIS — I50.22 CHRONIC SYSTOLIC HEART FAILURE (HCC): Primary | ICD-10-CM

## 2023-08-10 PROCEDURE — 93798 PHYS/QHP OP CAR RHAB W/ECG: CPT

## 2023-08-15 ENCOUNTER — CLINICAL SUPPORT (OUTPATIENT)
Dept: CARDIAC REHAB | Age: 73
End: 2023-08-15
Payer: MEDICARE

## 2023-08-15 DIAGNOSIS — I50.22 CHRONIC SYSTOLIC HEART FAILURE (HCC): Primary | ICD-10-CM

## 2023-08-15 PROCEDURE — 93798 PHYS/QHP OP CAR RHAB W/ECG: CPT

## 2023-08-15 NOTE — PROGRESS NOTES
Cardiac Rehabilitation Plan of Care   Discharge          Today's date: 8/15/2023   # of Exercise Sessions Completed: 36  Patient name: Riana Valencia      : 1950  Age: 81 y.o. MRN: [id number]  Referring Physician: Gus Coto DO  Cardiologist: Dr. Isaak Banerjee  Provider: [facility]  Clinician: Raciel Monet MS, CEP        Dx:   Encounter Diagnosis   Name Primary? • Chronic systolic heart failure ([address]) Yes     Date of onset: 3/12/23      SUMMARY OF PROGRESS:   8/15: Discharge note for [facility]. 6 MWT distance by 1.1% walking 880ft. His exercise tolerance (max METs in tolerated in cardiac rehab) increased by 14%. He had a 27% improvement in the [facility] activity estimated MET level with ADLs and physical activity. His [facility] QOL improved by 30%. PHQ-9 score decreased from 8 to 6. ANNALISE-7 decreased from 13 to 5. His weight increased by 1 pounds. Rate Your Plate score improved from 57 to 62. Jhony Hong has not been supplementing CR sessions with home exercise. He reports increased stamina, strength and reduced SOB with activity. Murphy tolerates 40 mins at 3.3 METs plus wt training. Paced on telemetry. RHR 70 - 74, ExHR 102 - 110. Resting BP 98/54 - 110/64 with appropriate response to exercise reaching 118/64. 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 They attend therapy together. He has not begun home exercise d/t ortho limitations of the hip. He states he is sleeping better, he has cut down on his drinking and is eating salads daily. He is abstaining from marijuana and nicotine. Discharge plans include joining [facility] fitness center or . Encouraged Pt to continue exercise. Frequency: 4-6 days/wk, Intensity: RPE 4-5, Time: 40-50 mins daily, 150-200 mins/wk. Pt was encouraged to continue eating heart healthy. The patient has the following personal goals he hopes to achieved by discharge: Lose weight, regular exercise routine.  He did not lose any weight. Pt was encouraged to remain compliant with medications and f/u with cardiologist with any cardiac symptoms, medication management and updated lipid profile. Medication compliance: Yes   Comments: Pt reports to be compliant with medications  Fall Risk: Low   Comments: Ambulates with a steady gait with no assist device and Denies a fall in the past 6 months    EKG Interpretation: Paced      EXERCISE ASSESSMENT and PLAN    Exercise Prescription:      Frequency: 3 days/week   Supplement with home exercise 2+ days/wk as tolerated       Minutes: 40        METS: 3.3           HR: RHR+30   RPE: 4-6         Modalities: Treadmill, UBE, NuStep and Recumbent bike      Strength trainin-3 days / week  12-15 repetitions  1-2 sets per modality    Modalities: Leg Press, Chest Press, Pull Downs and Lateral Raise    Home Exercise: none    Goals: 10% improvement in functional capacity - based on max METs achieved in fitness assessment, improved DASI score by 10%, Resume ADLs with increased strength, Attend Rehab regularly and start a home exercise program    Progression Toward Goals:  Goals not met: home exercise.  , Goals met: 10% improvement in functional capacity - based on max METs achieved in fitness assessment, improved DASI score by 10%, Resume ADLs with increased strength, Attend Rehab regularly. , Patient will be encouraged to focus on lifestyle modifications following discharge.     Education: home exercise guidelines, AHA guidelines to achieve >150 mins/wk of moderate exercise, RPE scale, physical activity/exercise in extreme weather conditions and Long QT   Plan:education on home exercise guidelines and home exercise 30+ mins 2 days opposite CR  Readiness to change: Preparation:  (Getting ready to change)  and Action:  (Changing behavior)      NUTRITION ASSESSMENT AND PLAN    Weight control:    Starting weight: 191   Current weight:   192    Lipid management: Last lipid profile 23  Chol 118   HDL 31  LDL 63    Goals:Improved Rate Your Plate score  >23, choose lean meat (93-95%), reduce portion sizes of meat to 3oz or less, Eat 4-5 cups of fruits and vegetables daily, eliminate or choose low-fat sweets and weight loss    Measurable goals were based Rate Your Plate Dietary Self-Assessment. These are the areas in which the patient could score higher on the assessment. Goals include recommendations for a heart healthy diet based on American Heart Association. Progression Toward Goals: Goals not met: weight loss.  , Goals met: Improved Rate Your Plate score  >74, choose lean meat (93-95%), reduce portion sizes of meat to 3oz or less, Eat 4-5 cups of fruits and vegetables daily, eliminate or choose low-fat sweets. , Patient will be encouraged to focus on lifestyle modifications following discharge. , he has abstained from marijuana and drinking in moderation,  Lux Moffett has been using his air fryer and eating more fish, less processed meats    Education: heart healthy eating  low sodium diet  hydration  nutrition for  lipid management  wt. loss   portion control  education class:  Label Reading  Plan: Education Class: Heart Healthy Eating, reduce portion sizes, eat fewer desserts and sweets, avoid processed foods, will replace sugar sweetened cereals with whole grain or oatmeal and abstain from alcohol  Readiness to change: Action:  (Changing behavior)      PSYCHOSOCIAL ASSESSMENT AND PLAN    Emotional:  Depression assessment:  PHQ-9 = 6  5-9 = Mild Depression            Anxiety measure:  ANNALISE-7 = 5  10-14 = Moderate anxiety  Self-reported stress level:  7  Social support: Patient reports excellent emotional/social support from psychologist, cousin and Patient has friends available for support when needed     Goals:  Reduce perceived stress to 1-3/10, PHQ-9 - reduced severity by one level, follow up with therapist, Feelings in [facility] Score < 3, Physical Fitness in [facility] Score < 3, Daily Activity in [place] Score < 3, Social Activities in [place] Score < 3, Overall Health in [place] Score < 3, Quality of Life in [facility] Score < 3 , improved sleep, improved positive thoughts of well being, increased energy, stop worrying, Feel less anxious and Handle anger/frustration better    Progression Toward Goals: Goals not met: reduced stress (is caregiver for his wife with neuro issues). , Goals met: PHQ-9 - reduced severity by one level, [place] score improved, increased energy for exercise., Patient will be encouraged to focus on lifestyle modifications following discharge., He socializes at the club. , will meet up with old friends for dinners once monthly    Education: benefits of a positive support system, stress management techniques, class:  Stress and Your Health  and class:  Relaxation  Plan: PHQ-9 >5 will refer to MD, Refer to [employer], Exercise, Join a support group  and maintain visits with therapist, practice meditation and hang out with friends  Readiness to change: Action:  (Changing behavior) and Maintenance: (Maintaining the behavior change)      OTHER CORE COMPONENTS     Tobacco:   Social History     Tobacco Use   Smoking Status Former   • Packs/day: 1.00   • Years: 15.00   • Total pack years: 15.00   • Types: Cigarettes   • Start date: 67   • Quit date: 1980   • Years since quittin.9   Smokeless Tobacco Never       Tobacco Use Intervention:   Pt quit many years ago   and has abstained    Anginal Symptoms:  None   NTG use: No prescription    Blood pressure:    Restin//64   Exercise: 118/64    Goals: moderate intensity exercise >150 mins/wk, medication compliance, Abstain from smoking and abstain from marijuana    Progression Toward Goals: Goals met: moderate intensity exercise >150 mins/wk, medication compliance, Abstain from smoking and abstain from marijuana., Patient will be encouraged to focus on lifestyle modifications following discharge.     Education:  low sodium diet and HTN, Education class: Understanding Heart Disease, smoking and heart disease, relapse education, smoking cessation and effecs of alcohol/MJ   Plan: Class: Common Heart Medications, Complete abstention from smoking and engage in regular exercise  Readiness to change: Action:  (Changing behavior) and Maintenance: (Maintaining the behavior change)

## 2023-08-16 ENCOUNTER — PATIENT MESSAGE (OUTPATIENT)
Dept: FAMILY MEDICINE CLINIC | Facility: CLINIC | Age: 73
End: 2023-08-16

## 2023-08-16 DIAGNOSIS — F33.9 DEPRESSION, RECURRENT (HCC): Primary | ICD-10-CM

## 2023-08-20 RX ORDER — ESCITALOPRAM OXALATE 10 MG/1
10 TABLET ORAL DAILY
Qty: 90 TABLET | Refills: 3 | Status: SHIPPED | OUTPATIENT
Start: 2023-08-20 | End: 2023-08-24 | Stop reason: ALTCHOICE

## 2023-08-21 ENCOUNTER — HOSPITAL ENCOUNTER (OUTPATIENT)
Dept: NON INVASIVE DIAGNOSTICS | Facility: HOSPITAL | Age: 73
Discharge: HOME | End: 2023-08-21
Attending: INTERNAL MEDICINE
Payer: MEDICARE

## 2023-08-21 VITALS
HEIGHT: 68 IN | HEART RATE: 73 BPM | WEIGHT: 191 LBS | BODY MASS INDEX: 28.95 KG/M2 | DIASTOLIC BLOOD PRESSURE: 68 MMHG | SYSTOLIC BLOOD PRESSURE: 112 MMHG

## 2023-08-21 DIAGNOSIS — I42.8 NICM (NONISCHEMIC CARDIOMYOPATHY) (HCC): ICD-10-CM

## 2023-08-21 LAB
AORTIC VALVE MEAN VELOCITY: 9.6 M/S
APICAL FOUR CHAMBER EJECTION FRACTION: 46 %
AV LVOT MEAN GRADIENT: 2 MMHG
AV LVOT PEAK GRADIENT: 3 MMHG
AV MEAN GRADIENT: 4 MMHG
AV PEAK GRADIENT: 8 MMHG
AV REGURGITATION PRESSURE HALF TIME: 531 MS
AV VELOCITY RATIO: 0.66
DOP CALC AO PEAK VEL: 1.4 M/S
DOP CALC AO VTI: 28.62 CM
DOP CALC LVOT PEAK VEL VTI: 19.06 CM
DOP CALC LVOT PEAK VEL: 0.92 M/S
LEFT VENTRICLE DIASTOLIC VOLUME (MOD BIPLANE): 236 ML
LEFT VENTRICLE SYSTOLIC VOLUME (MOD BIPLANE): 130 ML
LV EF: 45 %
SL CV AV DECELERATION TIME RETROGRADE: 1832 MS
SL CV AV PEAK GRADIENT RETROGRADE: 59 MMHG
SL CV LV EF: 45
TR MAX PG: 37 MMHG
TR PEAK VELOCITY: 3 M/S
TRICUSPID VALVE PEAK REGURGITATION VELOCITY: 3.02 M/S

## 2023-08-21 PROCEDURE — 93308 TTE F-UP OR LMTD: CPT

## 2023-08-21 PROCEDURE — 93308 TTE F-UP OR LMTD: CPT | Performed by: INTERNAL MEDICINE

## 2023-08-21 PROCEDURE — 93321 DOPPLER ECHO F-UP/LMTD STD: CPT | Performed by: INTERNAL MEDICINE

## 2023-08-21 PROCEDURE — 93325 DOPPLER ECHO COLOR FLOW MAPG: CPT | Performed by: INTERNAL MEDICINE

## 2023-08-24 ENCOUNTER — OFFICE VISIT (OUTPATIENT)
Dept: PULMONOLOGY | Facility: CLINIC | Age: 73
End: 2023-08-24
Payer: MEDICARE

## 2023-08-24 VITALS
SYSTOLIC BLOOD PRESSURE: 110 MMHG | DIASTOLIC BLOOD PRESSURE: 68 MMHG | HEART RATE: 91 BPM | OXYGEN SATURATION: 99 % | HEIGHT: 68 IN | WEIGHT: 195.8 LBS | RESPIRATION RATE: 18 BRPM | BODY MASS INDEX: 29.67 KG/M2

## 2023-08-24 DIAGNOSIS — I50.42 CHRONIC COMBINED SYSTOLIC AND DIASTOLIC CONGESTIVE HEART FAILURE (HCC): ICD-10-CM

## 2023-08-24 DIAGNOSIS — I27.20 PULMONARY HYPERTENSION (HCC): Primary | Chronic | ICD-10-CM

## 2023-08-24 PROCEDURE — 99214 OFFICE O/P EST MOD 30 MIN: CPT | Performed by: INTERNAL MEDICINE

## 2023-08-24 NOTE — PROGRESS NOTES
Progress note - Pulmonary Medicine   Titus Li 81 y.o. male MRN: [id number]       Impression & Plan:     Pulmonary hypertension ([address])  · Most recent echocardiogram does show reduced ejection fraction at 45%, improved from most recent hospitalization prior study  · Pulmonary pressure however is not particularly elevated on the current study    Chronic combined systolic and diastolic congestive heart failure (HCC)  Wt Readings from Last 3 Encounters:   08/24/23 88.8 kg (195 lb 12.8 oz)   08/21/23 86.6 kg (191 lb)   08/03/23 86.6 kg (191 lb)     · Recent echocardiogram with improved ejection fraction at 45%  · Has biventricular pacer/ICD  · Followed by cardiology Dr. Miley Olson and Dr. Tash Vila from   · Recent prolonged hospitalization after cardiac arrest from prolonged QT syndrome  · Currently performing cardiac rehab and reports that he is improving. He is intending to continue on a [facility] program [facility] are [facility] and he will follow-up with me in 6 months  ______________________________________________________________________    HPI:    Titus Li presents today for follow-up of pulmonary hypertension and obstructive sleep apnea which is chronically untreated. He was intolerant to CPAP. He is currently doing well but had a very significant event in March. He had cardiac arrest from prolonged QT syndrome and ultimately had placement of a biventricular ICD/pacemaker. He had significant impairment in systolic function as well. He seems to be recovering well. He is currently enrolled in cardiac rehabilitation. He has not had any signs or symptoms of worsening congestive heart failure or right ventricular failure. His breathing has been stable. No shortness of breath reported. No chest pains. No abdominal pain or GERD symptoms. He has lost weight during pulmonary rehab. He still does not sleep well.   He sometimes drinks beer, sometimes takes Ambien, sometimes takes melatonin but never really has prolonged/refreshing sleep.   The predominance of his sleep medications are managed by his primary care physician    Current Medications:    Current Outpatient Medications:   •  Acetaminophen 500 MG, Take 1,000 mg by mouth prn, Disp: , Rfl:   •  ascorbic acid (VITAMIN C) 250 MG CHEW, Chew 250 mg daily, Disp: , Rfl:   •  atorvastatin (LIPITOR) 20 mg tablet, TAKE ONE TABLET BY MOUTH ONCE EVERY DAY, Disp: 90 tablet, Rfl: 3  •  Blood Pressure Monitoring (Omron 5 Series BP Monitor) PEE, , Disp: , Rfl:   •  busPIRone (BUSPAR) 7.5 mg tablet, 1 tablet in the morning and 1 at night, Disp: 270 tablet, Rfl: 1  •  dabigatran etexilate (PRADAXA) 150 mg capsu, Take 1 capsule (150 mg total) by mouth 2 (two) times a day, Disp: 180 capsule, Rfl: 1  •  docusate sodium (COLACE) 100 mg capsule, Take 1 capsule (100 mg total) by mouth 2 (two) times a day, Disp: 60 capsule, Rfl: 0  •  Lactobacillus (PROBIOTIC ACIDOPHILUS PO), Take by mouth, Disp: , Rfl:   •  LORazepam (Ativan) 0.5 mg tablet, Take 1 tablet (0.5 mg total) by mouth every 8 (eight) hours as needed for anxiety, Disp: 60 tablet, Rfl: 1  •  losartan (COZAAR) 50 mg tablet, Take 1 tablet (50 mg total) by mouth daily, Disp: 90 tablet, Rfl: 1  •  montelukast (SINGULAIR) 10 mg tablet, TAKE ONE TABLET BY MOUTH DAILY, Disp: 90 tablet, Rfl: 3  •  nadolol (CORGARD) 80 MG tablet, Take 1 tablet (80 mg total) by mouth daily in the early morning, Disp: 90 tablet, Rfl: 3  •  Omega-3 Fatty Acids (FISH OIL) 1,000 mg, Take by mouth daily, Disp: , Rfl:   •  omeprazole (PriLOSEC) 40 MG capsule, Take one capsule by mouth once every day., Disp: 90 capsule, Rfl: 3  •  spironolactone (ALDACTONE) 25 mg tablet, Take 1 tablet (25 mg total) by mouth daily, Disp: 90 tablet, Rfl: 1  •  vitamin B-12 (CYANOCOBALAMIN) 500 MCG TABS, Take 1 tablet (500 mcg total) by mouth daily, Disp: 30 tablet, Rfl: 0  •  zolpidem (AMBIEN) 5 mg tablet, Take 1/2-1 full tablet at bedtime as needed for sleep. Do not take if drinking any alcohol or taking lorazepam, Disp: 30 tablet, Rfl: 0    Review of Systems:  Aside from what is mentioned in the HPI, the review of systems is otherwise negative    Past medical history, surgical history, and family history were reviewed and updated as appropriate    Social history updates:  Social History     Tobacco Use   Smoking Status Former   • Packs/day: 1.00   • Years: 15.00   • Total pack years: 15.00   • Types: Cigarettes   • Start date: 67   • Quit date: 1980   • Years since quittin.9   Smokeless Tobacco Never       PhysicalExamination:  Vitals:   /68 (BP Location: Left arm, Patient Position: Sitting, Cuff Size: Large)   Pulse 91   Resp 18   Ht 5' 8" (1.727 m)   Wt 88.8 kg (195 lb 12.8 oz)   SpO2 99%   BMI 29.77 kg/m²   Gen: Good spirits today. Comfortable on room air. No conversational dyspnea  HEENT:  Conjugate gaze. sclerae anicteric. Oropharynx moist  Neck: Trachea is midline. No JVD. No adenopathy  Chest: Symmetric but slightly limited chest wall excursion but breath sounds are clear bilaterally. No wheezes or crackles  Cardiac: Regular. no murmur  Abdomen:  benign  Extremities: No edema  Neuro:  Normal speech and mentation    Diagnostic Data:  Labs: I personally reviewed the most recent laboratory data pertinent to today's visit    Lab Results   Component Value Date    WBC 39.13 (HH) 2023    HGB 12.5 2023    HCT 37.6 2023     (H) 2023     2023     Lab Results   Component Value Date    SODIUM 133 (L) 2023    K 4.9 2023    CO2 29 2023     2023    BUN 15 2023    CREATININE 0.66 2023    GLUCOSE 130 01/10/2021    CALCIUM 9.4 2023         Imaging:  I personally reviewed the images on the [facility] system pertinent to today's visit  Recent chest x-ray was from March.   This still showed some residual edema/small effusions likely related to his cardiac arrest.    Other studies:  Current echocardiogram shows ejection fraction of 45% with no significant diastolic abnormality. No significant pulmonary hypertension identified.   Tricuspid regurgitant velocity was 3 m/s janene Charles MD

## 2023-08-24 NOTE — ASSESSMENT & PLAN NOTE
· Most recent echocardiogram does show reduced ejection fraction at 45%, improved from most recent hospitalization prior study  · Pulmonary pressure however is not particularly elevated on the current study

## 2023-08-24 NOTE — ASSESSMENT & PLAN NOTE
Wt Readings from Last 3 Encounters:   08/24/23 88.8 kg (195 lb 12.8 oz)   08/21/23 86.6 kg (191 lb)   08/03/23 86.6 kg (191 lb)     · Recent echocardiogram with improved ejection fraction at 45%  · Has biventricular pacer/ICD  · Followed by cardiology Dr. Tad Mcintyre and Dr. Jorge Escalante from   · Recent prolonged hospitalization after cardiac arrest from prolonged QT syndrome  · Currently performing cardiac rehab and reports that he is improving.   He is intending to continue on a exercise program at [facility]

## 2023-08-28 DIAGNOSIS — F32.A ANXIETY AND DEPRESSION: ICD-10-CM

## 2023-08-28 DIAGNOSIS — F41.9 ANXIETY AND DEPRESSION: ICD-10-CM

## 2023-08-28 NOTE — TELEPHONE ENCOUNTER
Patient l/m on Rx Line requesting:  busPIRone 7.5 mg tablet  The medication report on hand was not sent to the pharmacy. It was an update to the dosage/instructions.     Pharmacy: [facility] Appt: 7/28/2023  Future Appt: 10/23/2023  Opioid/Controlled Substance Contract: N/A for this request.

## 2023-08-29 ENCOUNTER — OFFICE VISIT (OUTPATIENT)
Dept: UROLOGY | Facility: AMBULATORY SURGERY CENTER | Age: 73
End: 2023-08-29
Payer: MEDICARE

## 2023-08-29 VITALS
DIASTOLIC BLOOD PRESSURE: 76 MMHG | HEIGHT: 68 IN | OXYGEN SATURATION: 99 % | BODY MASS INDEX: 29.55 KG/M2 | SYSTOLIC BLOOD PRESSURE: 118 MMHG | RESPIRATION RATE: 18 BRPM | HEART RATE: 88 BPM | WEIGHT: 195 LBS

## 2023-08-29 DIAGNOSIS — R33.8 BENIGN PROSTATIC HYPERPLASIA WITH URINARY RETENTION: Primary | ICD-10-CM

## 2023-08-29 DIAGNOSIS — N40.1 BENIGN PROSTATIC HYPERPLASIA WITH URINARY RETENTION: Primary | ICD-10-CM

## 2023-08-29 PROCEDURE — 99213 OFFICE O/P EST LOW 20 MIN: CPT | Performed by: UROLOGY

## 2023-08-29 RX ORDER — BUSPIRONE HYDROCHLORIDE 7.5 MG/1
TABLET ORAL
Qty: 180 TABLET | Refills: 3 | Status: SHIPPED | OUTPATIENT
Start: 2023-08-29

## 2023-08-29 NOTE — LETTER
August 29, 2023     Chloé Ortega, [address] [facility]    Patient: Jayce Iglesias   YOB: 1950   Date of Visit: 8/29/2023       Dear Dr. Ran Ramos:    Thank you for referring Jackie Ribera to me for evaluation. Below are my notes for this consultation. If you have questions, please do not hesitate to call me. I look forward to following your patient along with you. Sincerely,        Jose Vivas MD        CC: No Recipients    Jose Vivas MD  8/29/2023 11:54 AM  Sign when Signing Visit  8/29/2023    Jayce Iglesias  1950  [id number]        Assessment  History of BPH, status post TURP x2, history of large bladder diverticulum status post robot-assisted laparoscopic bladder diverticulectomy performed at [facility] AND [facility], recurrent urinary tract infection      Discussion  Ruby Henderson is doing very well at this time. He will continue clean intermittent catheterization. We discussed his recent urine culture which is negative for bacterial growth or even colonization. Per the recommendation of infectious disease we will continue to monitor him conservatively off of the suppression dose antibiotics. I see no indication to perform cystoscopy which was initially arranged for the follow-up 2023. Recommend follow-up in 6 months for a check-in and only obtain a urinalysis and urine culture if he is symptomatic with an infection. History of Present Illness  81 y.o. male with a history of BPH. He previously underwent TURP x2 to relieve his bladder outlet obstruction. He then underwent a robot-assisted laparoscopic bladder diverticulectomy performed at [facility]. AND [facility].  Despite this he  still continues to perform clean intermittent catheterization. He has a history of recurrent urinary tract infections although he is likely colonized with bacteria including both E. coli as well as Enterococcus.   Fortunately his most recent culture from August 2023 is negative for bacterial growth. He has a pacer in place. He previously had a history of cardiac arrest.        AUA Symptom Score      Review of Systems  Review of Systems   Constitutional: Negative. HENT: Negative. Eyes: Negative. Respiratory: Negative. Cardiovascular: Negative. Gastrointestinal: Negative. Endocrine: Negative. Genitourinary:        Per HPI   Musculoskeletal: Negative. Skin: Negative. Allergic/Immunologic: Negative. Neurological: Negative. Hematological: Negative. Psychiatric/Behavioral: Negative.           Past Medical History  Past Medical History:   Diagnosis Date   • Anxiety    • BPH (benign prostatic hypertrophy)    • Cancer (HCC)     CLL   • CLL (chronic lymphocytic leukemia) ([address])     2009   • Colon polyp    • Concussion     Resolved: 08/22/16   • Depression    • Diverticulitis 01/13/2020 2014 CT done 11/19 12/19 CT done    • E coli bacteremia 06/27/2021    2/2 blood cultures with Viv Betancourtn appears to be the urine    • Epididymitis 05/19/2021 5/2021   • Gastrointestinal hemorrhage     Last assessed: 08/27/13   • GERD (gastroesophageal reflux disease)    • H/O vitamin D deficiency 07/07/2021   • Hearing loss of aging    • Hiatal hernia    • History of right hip replacement 04/19/2021   • History of transfusion    • Hyperlipidemia    • Hypertension    • Hyponatremia 03/06/2023   • Iron deficiency anemia    • Microscopic hematuria     Last assessed: 06/28/13   • OA (osteoarthritis)     right hip   • Obesity (BMI 30.0-34.9) 04/07/2022   • Pneumothorax    • Primary osteoarthritis of right hip 09/29/2017    He is status post right hip arthroplasty   • Prostatitis    • Pulmonary hypertension (HCC)    • QT prolongation    • Seasonal allergies    • Sepsis ([address]) 03/06/2023   • Unresponsive episode 03/07/2023   • Urinary tract infection associated with catheterization of urinary tract ([address]) 02/05/2021    Pseudomonal UTI asymptomatic. Per Urology do not treat at this time. • Urinary tract infection associated with catheterization of urinary tract ([address]) 02/05/2021    Pseudomonal UTI asymptomatic. Per Urology do not treat at this time. He did have urosepsis from E coli 7/21   • UTI (urinary tract infection)     in past   • Wears glasses        Past Social History  Past Surgical History:   Procedure Laterality Date   • BLADDER SURGERY     • CARDIAC CATHETERIZATION Left 3/7/2023    Procedure: Cardiac Left Heart Cath;  Surgeon: Jose Singleton MD;  [facility]; Service: Cardiology   • CARDIAC CATHETERIZATION N/A 3/7/2023    Procedure: Cardiac temporary pacemaker;  Surgeon: Jose Singleton MD;  [facility]; Service: Cardiology   • CARDIAC ELECTROPHYSIOLOGY PROCEDURE N/A 3/8/2023    Procedure: Cardiac icd implant;  Surgeon: Christopher So MD;  [facility];   Service: Cardiology   • COLONOSCOPY     • CYSTOSCOPY  10/09/2014    Diagnostic   • CYSTOSCOPY  06/29/2020   • FL CYSTOGRAM  08/15/2022   • FRACTURE SURGERY      left lower arm   • FRACTURE SURGERY      left femur   • HERNIA REPAIR     • JOINT REPLACEMENT Right     hip   • OTHER SURGICAL HISTORY  03/2011    Spinal anesthesia epidural   • GA ARTHRP ACETBLR/PROX FEM PROSTC AGRFT/ALGRFT Right 09/29/2017    Procedure: ARTHROPLASTY HIP TOTAL ANTERIOR;  Surgeon: Sylwia Bass MD;  Location: [facility];  Service: Orthopedics   • TONSILLECTOMY AND ADENOIDECTOMY     • TRANSURETHRAL RESECTION OF PROSTATE      x 2   • WRIST SURGERY         Past Family History  Family History   Problem Relation Age of Onset   • No Known Problems Mother    • Heart attack Father    • Diabetes Brother    • Prostate cancer Brother        Past Social history  Social History     Socioeconomic History   • Marital status: /Civil Union     Spouse name: Not on file   • Number of children: Not on file   • Years of education: Not on file   • Highest education level: Not on file   Occupational History   • Not on file   Tobacco Use   • Smoking status: Former     Packs/day: 1.00     Years: 15.00     Total pack years: 15.00     Types: Cigarettes     Start date: 67     Quit date: 1980     Years since quittin.0   • Smokeless tobacco: Never   Vaping Use   • Vaping Use: Never used   Substance and Sexual Activity   • Alcohol use: Not Currently     Alcohol/week: 10.0 standard drinks of alcohol     Types: 10 Cans of beer per week     Comment: no more drinking   • Drug use: Not Currently     Types: Marijuana     Comment: "medical marijuana"   • Sexual activity: Not Currently   Other Topics Concern   • Not on file   Social History Narrative   • Not on file     Social Determinants of Health     Financial Resource Strain: Low Risk  (10/17/2022)    Overall Financial Resource Strain (CARDIA)    • Difficulty of Paying Living Expenses: Not hard at all   Food Insecurity: No Food Insecurity (3/9/2023)    Hunger Vital Sign    • Worried About Running Out of Food in the Last Year: Never true    • Ran Out of Food in the Last Year: Never true   Transportation Needs: No Transportation Needs (3/9/2023)    PRAPARE - Transportation    • Lack of Transportation (Medical): No    • Lack of Transportation (Non-Medical):  No   Physical Activity: Not on file   Stress: Not on file   Social Connections: Not on file   Intimate Partner Violence: Not on file   Housing Stability: Low Risk  (3/9/2023)    Housing Stability Vital Sign    • Unable to Pay for Housing in the Last Year: No    • Number of Places Lived in the Last Year: 1    • Unstable Housing in the Last Year: No       Current Medications  Current Outpatient Medications   Medication Sig Dispense Refill   • ascorbic acid (VITAMIN C) 250 MG CHEW Chew 250 mg daily     • atorvastatin (LIPITOR) 20 mg tablet TAKE ONE TABLET BY MOUTH ONCE EVERY DAY 90 tablet 3   • Blood Pressure Monitoring (Omron 5 Series BP Monitor) PEE      • busPIRone (BUSPAR) 7.5 mg tablet 1 tablet in the morning and 1 at night 270 tablet 1   • dabigatran etexilate (PRADAXA) 150 mg capsu Take 1 capsule (150 mg total) by mouth 2 (two) times a day 180 capsule 1   • Lactobacillus (PROBIOTIC ACIDOPHILUS PO) Take by mouth     • LORazepam (Ativan) 0.5 mg tablet Take 1 tablet (0.5 mg total) by mouth every 8 (eight) hours as needed for anxiety 60 tablet 1   • losartan (COZAAR) 50 mg tablet Take 1 tablet (50 mg total) by mouth daily 90 tablet 1   • montelukast (SINGULAIR) 10 mg tablet TAKE ONE TABLET BY MOUTH DAILY 90 tablet 3   • nadolol (CORGARD) 80 MG tablet Take 1 tablet (80 mg total) by mouth daily in the early morning 90 tablet 3   • Omega-3 Fatty Acids (FISH OIL) 1,000 mg Take by mouth daily     • omeprazole (PriLOSEC) 40 MG capsule Take one capsule by mouth once every day. 90 capsule 3   • spironolactone (ALDACTONE) 25 mg tablet Take 1 tablet (25 mg total) by mouth daily 90 tablet 1   • zolpidem (AMBIEN) 5 mg tablet Take 1/2-1 full tablet at bedtime as needed for sleep. Do not take if drinking any alcohol or taking lorazepam 30 tablet 0   • Acetaminophen 500 MG Take 1,000 mg by mouth prn (Patient not taking: Reported on 8/29/2023)     • docusate sodium (COLACE) 100 mg capsule Take 1 capsule (100 mg total) by mouth 2 (two) times a day 60 capsule 0   • vitamin B-12 (CYANOCOBALAMIN) 500 MCG TABS Take 1 tablet (500 mcg total) by mouth daily 30 tablet 0     No current facility-administered medications for this visit. Allergies  Allergies   Allergen Reactions   • Ciprofloxacin Other (See Comments)     LONG QT syndrome   • Codeine Other (See Comments)     agitated   • Sulfamethoxazole-Trimethoprim GI Intolerance, Abdominal Pain and Other (See Comments)     upset stomach       Past Medical History, Social History, Family History, medications and allergies were reviewed.     Vitals  Vitals:    08/29/23 1109   BP: 118/76   BP Location: Left arm   Patient Position: Sitting   Cuff Size: Standard Pulse: 88   Resp: 18   SpO2: 99%   Weight: 88.5 kg (195 lb)   Height: 5' 8" (1.727 m)       Physical Exam  Physical Exam  He is in no acute distress.   Gait normal.  Affect normal    Results  Lab Results   Component Value Date    PSA 0.14 07/26/2023    PSA 0.1 04/18/2023    PSA 0.4 07/29/2021     Lab Results   Component Value Date    GLUCOSE 130 01/10/2021    CALCIUM 9.4 07/26/2023     (L) 07/20/2015    K 4.9 07/26/2023    CO2 29 07/26/2023     07/26/2023    BUN 15 07/26/2023    CREATININE 0.66 07/26/2023     Lab Results   Component Value Date    WBC 39.13 (HH) 07/26/2023    HGB 12.5 07/26/2023    HCT 37.6 07/26/2023     (H) 07/26/2023     07/26/2023         Office Urine Dip  No results found for this or any previous visit (from the past 1 hour(s)).]

## 2023-08-29 NOTE — PROGRESS NOTES
8/29/2023    Lux Reina  1950  [id number]        Assessment  History of BPH, status post TURP x2, history of large bladder diverticulum status post robot-assisted laparoscopic bladder diverticulectomy performed at [facility]. AND [facility], recurrent urinary tract infection      Discussion  Patsy Espinoza is doing very well at this time. He will continue clean intermittent catheterization. We discussed his recent urine culture which is negative for bacterial growth or even colonization. Per the recommendation of infectious disease we will continue to monitor him conservatively off of the suppression dose antibiotics. I see no indication to perform cystoscopy which was initially arranged for the follow-up 2023. Recommend follow-up in 6 months for a check-in and only obtain a urinalysis and urine culture if he is symptomatic with an infection. History of Present Illness  81 y.o. male with a history of BPH. He previously underwent TURP x2 to relieve his bladder outlet obstruction. He then underwent a robot-assisted laparoscopic bladder diverticulectomy performed at [facility].  Despite this he  still continues to perform clean intermittent catheterization. He has a history of recurrent urinary tract infections although he is likely colonized with bacteria including both E. coli as well as Enterococcus. Fortunately his most recent culture from August 2023 is negative for bacterial growth. He has a pacer in place. He previously had a history of cardiac arrest.        AUA Symptom Score      Review of Systems  Review of Systems   Constitutional: Negative. HENT: Negative. Eyes: Negative. Respiratory: Negative. Cardiovascular: Negative. Gastrointestinal: Negative. Endocrine: Negative. Genitourinary:        Per HPI   Musculoskeletal: Negative. Skin: Negative. Allergic/Immunologic: Negative. Neurological: Negative. Hematological: Negative. Psychiatric/Behavioral: Negative. Past Medical History  Past Medical History:   Diagnosis Date   • Anxiety    • BPH (benign prostatic hypertrophy)    • Cancer (HCC)     CLL   • CLL (chronic lymphocytic leukemia) ([address])     2009   • Colon polyp    • Concussion     Resolved: 08/22/16   • Depression    • Diverticulitis 01/13/2020 2014 CT done 11/19 12/19 CT done    • E coli bacteremia 06/27/2021    2/2 blood cultures with Buffalo Manoje appears to be the urine    • Epididymitis 05/19/2021 5/2021   • Gastrointestinal hemorrhage     Last assessed: 08/27/13   • GERD (gastroesophageal reflux disease)    • H/O vitamin D deficiency 07/07/2021   • Hearing loss of aging    • Hiatal hernia    • History of right hip replacement 04/19/2021   • History of transfusion    • Hyperlipidemia    • Hypertension    • Hyponatremia 03/06/2023   • Iron deficiency anemia    • Microscopic hematuria     Last assessed: 06/28/13   • OA (osteoarthritis)     right hip   • Obesity (BMI 30.0-34.9) 04/07/2022   • Pneumothorax    • Primary osteoarthritis of right hip 09/29/2017    He is status post right hip arthroplasty   • Prostatitis    • Pulmonary hypertension (HCC)    • QT prolongation    • Seasonal allergies    • Sepsis ([address]) 03/06/2023   • Unresponsive episode 03/07/2023   • Urinary tract infection associated with catheterization of urinary tract ([address]) 02/05/2021    Pseudomonal UTI asymptomatic. Per Urology do not treat at this time. • Urinary tract infection associated with catheterization of urinary tract ([address]) 02/05/2021    Pseudomonal UTI asymptomatic. Per Urology do not treat at this time.   He did have urosepsis from E coli 7/21   • UTI (urinary tract infection)     in past   • Wears glasses        Past Social History  Past Surgical History:   Procedure Laterality Date   • BLADDER SURGERY     • CARDIAC CATHETERIZATION Left 3/7/2023    Procedure: Cardiac Left Heart Cath;  Surgeon: German Vidal MD;  Location: [facility]; Service: Cardiology   • CARDIAC CATHETERIZATION N/A 3/7/2023    Procedure: Cardiac temporary pacemaker;  Surgeon: Cheryl Moulton MD;  [facility]: [facility]; Service: Cardiology   • CARDIAC ELECTROPHYSIOLOGY PROCEDURE N/A 3/8/2023    Procedure: Cardiac icd implant;  Surgeon: Gibson Jean MD;  Location:  CARDIAC CATH LAB;   Service: Cardiology   • COLONOSCOPY     • CYSTOSCOPY  10/09/2014    Diagnostic   • CYSTOSCOPY  2020   • FL CYSTOGRAM  08/15/2022   • FRACTURE SURGERY      left lower arm   • FRACTURE SURGERY      left femur   • HERNIA REPAIR     • JOINT REPLACEMENT Right     hip   • OTHER SURGICAL HISTORY  2011    Spinal anesthesia epidural   • ID ARTHRP ACETBLR/PROX FEM PROSTC AGRFT/ALGRFT Right 2017    Procedure: ARTHROPLASTY HIP TOTAL ANTERIOR;  Surgeon: Jorge Knox MD;  Location: [facility];  Service: Orthopedics   • TONSILLECTOMY AND ADENOIDECTOMY     • TRANSURETHRAL RESECTION OF PROSTATE      x 2   • WRIST SURGERY         Past Family History  Family History   Problem Relation Age of Onset   • No Known Problems Mother    • Heart attack Father    • Diabetes Brother    • Prostate cancer Brother        Past Social history  Social History     Socioeconomic History   • Marital status: /Civil Union     Spouse name: Not on file   • Number of children: Not on file   • Years of education: Not on file   • Highest education level: Not on file   Occupational History   • Not on file   Tobacco Use   • Smoking status: Former     Packs/day: 1.00     Years: 15.00     Total pack years: 15.00     Types: Cigarettes     Start date: 67     Quit date: 1980     Years since quittin.0   • Smokeless tobacco: Never   Vaping Use   • Vaping Use: Never used   Substance and Sexual Activity   • Alcohol use: Not Currently     Alcohol/week: 10.0 standard drinks of alcohol     Types: 10 Cans of beer per week     Comment: no more drinking   • Drug use: Not Currently     Types: Marijuana Comment: "medical marijuana"   • Sexual activity: Not Currently   Other Topics Concern   • Not on file   Social History Narrative   • Not on file     Social Determinants of Health     Financial Resource Strain: Low Risk  (10/17/2022)    Overall Financial Resource Strain (CARDIA)    • Difficulty of Paying Living Expenses: Not hard at all   Food Insecurity: No Food Insecurity (3/9/2023)    Hunger Vital Sign    • Worried About Running Out of Food in the Last Year: Never true    • Ran Out of Food in the Last Year: Never true   Transportation Needs: No Transportation Needs (3/9/2023)    PRAPARE - Transportation    • Lack of Transportation (Medical): No    • Lack of Transportation (Non-Medical):  No   Physical Activity: Not on file   Stress: Not on file   Social Connections: Not on file   Intimate Partner Violence: Not on file   Housing Stability: Low Risk  (3/9/2023)    Housing Stability Vital Sign    • Unable to Pay for Housing in the Last Year: No    • Number of Places Lived in the Last Year: 1    • Unstable Housing in the Last Year: No       Current Medications  Current Outpatient Medications   Medication Sig Dispense Refill   • ascorbic acid (VITAMIN C) 250 MG CHEW Chew 250 mg daily     • atorvastatin (LIPITOR) 20 mg tablet TAKE ONE TABLET BY MOUTH ONCE EVERY DAY 90 tablet 3   • Blood Pressure Monitoring (Omron 5 Series BP Monitor) PEE      • busPIRone (BUSPAR) 7.5 mg tablet 1 tablet in the morning and 1 at night 270 tablet 1   • dabigatran etexilate (PRADAXA) 150 mg capsu Take 1 capsule (150 mg total) by mouth 2 (two) times a day 180 capsule 1   • Lactobacillus (PROBIOTIC ACIDOPHILUS PO) Take by mouth     • LORazepam (Ativan) 0.5 mg tablet Take 1 tablet (0.5 mg total) by mouth every 8 (eight) hours as needed for anxiety 60 tablet 1   • losartan (COZAAR) 50 mg tablet Take 1 tablet (50 mg total) by mouth daily 90 tablet 1   • montelukast (SINGULAIR) 10 mg tablet TAKE ONE TABLET BY MOUTH DAILY 90 tablet 3   • nadolol (CORGARD) 80 MG tablet Take 1 tablet (80 mg total) by mouth daily in the early morning 90 tablet 3   • Omega-3 Fatty Acids (FISH OIL) 1,000 mg Take by mouth daily     • omeprazole (PriLOSEC) 40 MG capsule Take one capsule by mouth once every day. 90 capsule 3   • spironolactone (ALDACTONE) 25 mg tablet Take 1 tablet (25 mg total) by mouth daily 90 tablet 1   • zolpidem (AMBIEN) 5 mg tablet Take 1/2-1 full tablet at bedtime as needed for sleep. Do not take if drinking any alcohol or taking lorazepam 30 tablet 0   • Acetaminophen 500 MG Take 1,000 mg by mouth prn (Patient not taking: Reported on 8/29/2023)     • docusate sodium (COLACE) 100 mg capsule Take 1 capsule (100 mg total) by mouth 2 (two) times a day 60 capsule 0   • vitamin B-12 (CYANOCOBALAMIN) 500 MCG TABS Take 1 tablet (500 mcg total) by mouth daily 30 tablet 0     No current facility-administered medications for this visit. Allergies  Allergies   Allergen Reactions   • Ciprofloxacin Other (See Comments)     LONG QT syndrome   • Codeine Other (See Comments)     agitated   • Sulfamethoxazole-Trimethoprim GI Intolerance, Abdominal Pain and Other (See Comments)     upset stomach       Past Medical History, Social History, Family History, medications and allergies were reviewed. Vitals  Vitals:    08/29/23 1109   BP: 118/76   BP Location: Left arm   Patient Position: Sitting   Cuff Size: Standard   Pulse: 88   Resp: 18   SpO2: 99%   Weight: 88.5 kg (195 lb)   Height: 5' 8" (1.727 m)       Physical Exam  Physical Exam  He is in no acute distress.   Gait normal.  Affect normal    Results  Lab Results   Component Value Date    PSA 0.14 07/26/2023    PSA 0.1 04/18/2023    PSA 0.4 07/29/2021     Lab Results   Component Value Date    GLUCOSE 130 01/10/2021    CALCIUM 9.4 07/26/2023     (L) 07/20/2015    K 4.9 07/26/2023    CO2 29 07/26/2023     07/26/2023    BUN 15 07/26/2023    CREATININE 0.66 07/26/2023     Lab Results   Component Value Date WBC 39.13 (HH) 07/26/2023    HGB 12.5 07/26/2023    HCT 37.6 07/26/2023     (H) 07/26/2023     07/26/2023         Office Urine Dip  No results found for this or any previous visit (from the past 1 hour(s)).]

## 2023-08-30 ENCOUNTER — OFFICE VISIT (OUTPATIENT)
Dept: CARDIOLOGY CLINIC | Facility: CLINIC | Age: 73
End: 2023-08-30
Payer: MEDICARE

## 2023-08-30 VITALS
BODY MASS INDEX: 29.62 KG/M2 | HEART RATE: 72 BPM | DIASTOLIC BLOOD PRESSURE: 78 MMHG | WEIGHT: 194.8 LBS | SYSTOLIC BLOOD PRESSURE: 134 MMHG

## 2023-08-30 DIAGNOSIS — I45.81 LONG Q-T SYNDROME: Primary | ICD-10-CM

## 2023-08-30 DIAGNOSIS — G47.00 INSOMNIA, UNSPECIFIED TYPE: ICD-10-CM

## 2023-08-30 PROBLEM — N39.0 COMPLICATED UTI (URINARY TRACT INFECTION): Status: RESOLVED | Noted: 2023-06-30 | Resolved: 2023-08-30

## 2023-08-30 PROCEDURE — 99214 OFFICE O/P EST MOD 30 MIN: CPT | Performed by: INTERNAL MEDICINE

## 2023-08-30 PROCEDURE — 93000 ELECTROCARDIOGRAM COMPLETE: CPT | Performed by: INTERNAL MEDICINE

## 2023-08-30 NOTE — PROGRESS NOTES
Cardiology             Francisco Munguia  1950  [id number]                 Assessment/Plan     Long QT syndrome 2, now on nadolol, status post dual-chamber/biventricular AICD placed 3/8/2023  Paroxysmal atrial fibrillation/flutter  Paroxysmal SVT, status post adenosine 3/6/2023  Torsade de pointes and V-fib episode 3/6/2023  Nonischemic cardiomyopathy, ejection fraction 30%.  Normal coronary angiography 3/7/2023  Mild ascending aortic aneurysm  CLL  History of heavy alcohol use  Dyslipidemia           AV paced rhythm on ECG today with stable QTc interval.  Continue nadolol. Continue following up with electrophysiology for long QT syndrome  Continue Pradaxa anticoagulation for paroxysmal atrial fibrillation/flutter  Fortunately, left ejection fraction improved to 45%. Prior coronary angiography normal 3/7/2023. Continue losartan, spironolactone, nadolol. Continue atorvastatin for dyslipidemia  With touch base with Dr. Viann Sicard to be sure patient will be okay with anesthesia administered at time of colonoscopy, which she wants to schedule              Follow-up in 6 months              Interval History:      Carlos Willams is a 81 y. o. male with hypertension who has to be on minoxidil in the past. Judy Gonsalez also has a history of prolonged QT interval, and has been seeing Dr. Ezequiel Degroot in the past. [facility] has also been following Dr. Faisal Brewster a pulmonary medicine for pulmonary hypertension and sleep apnea.     He was hospitalized with severe COVID-19 pneumonia 01/2021 at which time our group was consulted due to bradycardia and short runs of nonsustained ventricular tachycardia.  He was maintained on metoprolol and QT prolonging agents were avoided.       He presents to the hospital 3/6/2023 after an unresponsive episode at home. [facility] apparently noticed ventricular fibrillation and gave him 1 shock and started CPR. Judy Gonsalez was not intubated and presented to the ER lethargic and confused. Judy Gonsalez was in a narrow complex regular tachycardia, likely SVT.  He was given adenosine with resolution of his arrhythmia, back into sinus rhythm.  QT interval was initially within normal limits, but subsequently started becoming progressively more prolonged.  In the morning of 3/7/2023 he had torsades with resultant ventricular fibrillation.  He spontaneously converted back to sinus rhythm and was then started on a lidocaine infusion.  He underwent coronary angiography 3/7/2023 revealing no obstructive CAD.  A temporary pacing wire was placed for recurrent torsades. He was transferred to [facility] and was thought to [facility], with possible torsades precipitated at home due to SSRI and antibiotic use. [facility] was initiated on nadolol.      He was noted to have paroxysmal atrial fibrillation/flutter and was placed on Xarelto anticoagulation.     Echocardiogram 3/9/2023 revealed left ventricle ejection fraction 30%.  This was thought to be a nonischemic cardiomyopathy possibly due to stress. On 3/8/2023, he underwent placement of a Medtronic dual-chamber AICD with an RA lead, RV lead, and LV/his lead. [facility] was evaluated by the heart failure service and initiated on losartan and spironolactone.  He was maintained on nadolol with plans to repeat echocardiogram in 3 months. He presents today for follow-up. He states he would like to schedule a colonoscopy in the near future. He complains about insomnia and complains that his PCP is not "giving me what I want."  He denies exertional chest pain, shortness of breath, dizziness, palpitations, lower extremity edema.           Vitals:  Vitals:    08/30/23 0815   BP: 134/78   BP Location: Left arm   Patient Position: Sitting   Cuff Size: Adult   Pulse: 72   Weight: 88.4 kg (194 lb 12.8 oz)         Past Medical History:   Diagnosis Date   • Anxiety    • BPH (benign prostatic hypertrophy)    • Cancer (HCC)     CLL   • CLL (chronic lymphocytic leukemia) ([address])     2009   • Colon polyp    • Concussion Resolved: 16   • Depression    • Diverticulitis 2020 CT done  CT done    • E coli bacteremia 2021    2/2 blood cultures with [address] appears to be the urine    • Epididymitis 2021   • Gastrointestinal hemorrhage     Last assessed: 13   • GERD (gastroesophageal reflux disease)    • H/O vitamin D deficiency 2021   • Hearing loss of aging    • Hiatal hernia    • History of right hip replacement 2021   • History of transfusion    • Hyperlipidemia    • Hypertension    • Hyponatremia 2023   • Iron deficiency anemia    • Microscopic hematuria     Last assessed: 13   • OA (osteoarthritis)     right hip   • Obesity (BMI 30.0-34.9) 2022   • Pneumothorax    • Primary osteoarthritis of right hip 2017    He is status post right hip arthroplasty   • Prostatitis    • Pulmonary hypertension ([address])    • QT prolongation    • Seasonal allergies    • Sepsis ([address]) 2023   • Unresponsive episode 2023   • Urinary tract infection associated with catheterization of urinary tract ([address]) 2021    Pseudomonal UTI asymptomatic. Per Urology do not treat at this time. • Urinary tract infection associated with catheterization of urinary tract ([address]) 2021    Pseudomonal UTI asymptomatic. Per Urology do not treat at this time.   He did have urosepsis from E coli    • UTI (urinary tract infection)     in past   • Wears glasses      Social History     Socioeconomic History   • Marital status: /Civil Union     Spouse name: Not on file   • Number of children: Not on file   • Years of education: Not on file   • Highest education level: Not on file   Occupational History   • Not on file   Tobacco Use   • Smoking status: Former     Packs/day: 1.00     Years: 15.00     Total pack years: 15.00     Types: Cigarettes     Start date: 67     Quit date: 1980     Years since quittin.0   • Smokeless tobacco: Never   Vaping Use   • Vaping Use: Never used   Substance and Sexual Activity   • Alcohol use: Not Currently     Alcohol/week: 10.0 standard drinks of alcohol     Types: 10 Cans of beer per week     Comment: no more drinking   • Drug use: Not Currently     Types: Marijuana     Comment: "medical marijuana"   • Sexual activity: Not Currently   Other Topics Concern   • Not on file   Social History Narrative   • Not on file     Social Determinants of Health     Financial Resource Strain: Low Risk  (10/17/2022)    Overall Financial Resource Strain (CARDIA)    • Difficulty of Paying Living Expenses: Not hard at all   Food Insecurity: No Food Insecurity (3/9/2023)    Hunger Vital Sign    • Worried About Running Out of Food in the Last Year: Never true    • Ran Out of Food in the Last Year: Never true   Transportation Needs: No Transportation Needs (3/9/2023)    PRAPARE - Transportation    • Lack of Transportation (Medical): No    • Lack of Transportation (Non-Medical): No   Physical Activity: Not on file   Stress: Not on file   Social Connections: Not on file   Intimate Partner Violence: Not on file   Housing Stability: Low Risk  (3/9/2023)    Housing Stability Vital Sign    • Unable to Pay for Housing in the Last Year: No    • Number of Places Lived in the Last Year: 1    • Unstable Housing in the Last Year: No      Family History   Problem Relation Age of Onset   • No Known Problems Mother    • Heart attack Father    • Diabetes Brother    • Prostate cancer Brother      Past Surgical History:   Procedure Laterality Date   • BLADDER SURGERY     • CARDIAC CATHETERIZATION Left 3/7/2023    Procedure: Cardiac Left Heart Cath;  Surgeon: Zoila Smyth MD;  Location: [facility]; Service: Cardiology   • CARDIAC CATHETERIZATION N/A 3/7/2023    Procedure: Cardiac temporary pacemaker;  Surgeon: Zoila Smyth MD;  Location: [facility];   Service: Cardiology   • CARDIAC ELECTROPHYSIOLOGY PROCEDURE N/A 3/8/2023    Procedure: Cardiac icd implant;  Surgeon: Tristian Rosas MD;  Location:  CARDIAC CATH LAB;   Service: Cardiology   • COLONOSCOPY     • CYSTOSCOPY  10/09/2014    Diagnostic   • CYSTOSCOPY  06/29/2020   • FL CYSTOGRAM  08/15/2022   • FRACTURE SURGERY      left lower arm   • FRACTURE SURGERY      left femur   • HERNIA REPAIR     • JOINT REPLACEMENT Right     hip   • OTHER SURGICAL HISTORY  03/2011    Spinal anesthesia epidural   • NM ARTHRP ACETBLR/PROX FEM PROSTC AGRFT/ALGRFT Right 09/29/2017    Procedure: ARTHROPLASTY HIP TOTAL ANTERIOR;  Surgeon: Osiel Yarbrough MD;  Location: [facility];  Service: Orthopedics   • TONSILLECTOMY AND ADENOIDECTOMY     • TRANSURETHRAL RESECTION OF PROSTATE      x 2   • WRIST SURGERY         Current Outpatient Medications:   •  ascorbic acid (VITAMIN C) 250 MG CHEW, Chew 250 mg daily, Disp: , Rfl:   •  atorvastatin (LIPITOR) 20 mg tablet, TAKE ONE TABLET BY MOUTH ONCE EVERY DAY, Disp: 90 tablet, Rfl: 3  •  Blood Pressure Monitoring (Omron 5 Series BP Monitor) PEE, , Disp: , Rfl:   •  busPIRone (BUSPAR) 7.5 mg tablet, 1 tablet in the morning and 1 at night, Disp: 180 tablet, Rfl: 3  •  dabigatran etexilate (PRADAXA) 150 mg capsu, Take 1 capsule (150 mg total) by mouth 2 (two) times a day, Disp: 180 capsule, Rfl: 1  •  docusate sodium (COLACE) 100 mg capsule, Take 1 capsule (100 mg total) by mouth 2 (two) times a day, Disp: 60 capsule, Rfl: 0  •  Lactobacillus (PROBIOTIC ACIDOPHILUS PO), Take by mouth, Disp: , Rfl:   •  LORazepam (Ativan) 0.5 mg tablet, Take 1 tablet (0.5 mg total) by mouth every 8 (eight) hours as needed for anxiety, Disp: 60 tablet, Rfl: 1  •  losartan (COZAAR) 50 mg tablet, Take 1 tablet (50 mg total) by mouth daily, Disp: 90 tablet, Rfl: 1  •  montelukast (SINGULAIR) 10 mg tablet, TAKE ONE TABLET BY MOUTH DAILY, Disp: 90 tablet, Rfl: 3  •  nadolol (CORGARD) 80 MG tablet, Take 1 tablet (80 mg total) by mouth daily in the early morning, Disp: 90 tablet, Rfl: 3  •  Omega-3 Fatty Acids (FISH OIL) 1,000 mg, Take by mouth daily, Disp: , Rfl:   •  omeprazole (PriLOSEC) 40 MG capsule, Take one capsule by mouth once every day., Disp: 90 capsule, Rfl: 3  •  spironolactone (ALDACTONE) 25 mg tablet, Take 1 tablet (25 mg total) by mouth daily, Disp: 90 tablet, Rfl: 1  •  vitamin B-12 (CYANOCOBALAMIN) 500 MCG TABS, Take 1 tablet (500 mcg total) by mouth daily, Disp: 30 tablet, Rfl: 0  •  zolpidem (AMBIEN) 5 mg tablet, Take 1/2-1 full tablet at bedtime as needed for sleep. Do not take if drinking any alcohol or taking lorazepam, Disp: 30 tablet, Rfl: 0  •  Acetaminophen 500 MG, Take 1,000 mg by mouth prn (Patient not taking: Reported on 8/29/2023), Disp: , Rfl:         Review of Systems:  Review of Systems   Respiratory: Negative. Cardiovascular: Negative. All other systems reviewed and are negative. Physical Exam:  Physical Exam  Constitutional:       General: He is not in acute distress. Appearance: He is well-developed. He is not diaphoretic. HENT:      Head: Normocephalic and atraumatic. Eyes:      General: No scleral icterus. Right eye: No discharge. Pupils: Pupils are equal, round, and reactive to light. Neck:      Thyroid: No thyromegaly. Cardiovascular:      Rate and Rhythm: Normal rate and regular rhythm. Heart sounds: Normal heart sounds. No murmur heard. No friction rub. No gallop. Pulmonary:      Effort: Pulmonary effort is normal.      Breath sounds: Normal breath sounds. Abdominal:      General: There is no distension. Tenderness: There is no abdominal tenderness. There is no guarding or rebound. Musculoskeletal:         General: Normal range of motion. Cervical back: Normal range of motion and neck supple. Skin:     General: Skin is warm and dry. Coloration: Skin is not pale. Findings: No erythema or rash. Neurological:      Mental Status: He is alert and oriented to person, place, and time.       Coordination: Coordination normal.   Psychiatric:         Behavior: Behavior normal.         Thought Content: Thought content normal.         Judgment: Judgment normal.         This note was completed in part utilizing M-Modal Fluency Direct Software. Grammatical errors, random word insertions, spelling mistakes, and incomplete sentences can be an occasional consequence of this system secondary to software limitations, ambient noise, and hardware issues. If you have any questions or concerns about the content, text, or information contained within the body of this dictation, please contact the provider for clarification.

## 2023-09-11 ENCOUNTER — TELEPHONE (OUTPATIENT)
Dept: CARDIOLOGY CLINIC | Facility: CLINIC | Age: 73
End: 2023-09-11

## 2023-09-11 ENCOUNTER — TELEPHONE (OUTPATIENT)
Age: 73
End: 2023-09-11

## 2023-09-11 DIAGNOSIS — F51.01 PRIMARY INSOMNIA: ICD-10-CM

## 2023-09-11 NOTE — TELEPHONE ENCOUNTER
PC from patient who saw Dr Lynell Phalen on Aug 30 th and requested a clearance from Dr Lynell Phalen for Colonoscopy. Dr Lynell Phalen "needed to consult with Dr Raisa Pinon first". And so far he "has heard nothing." He wants a call from Dr Lynell Phalen personally at [phone] regarding if he can be set up or not.

## 2023-09-11 NOTE — TELEPHONE ENCOUNTER
Spoke with patient at length. I had touch base with Dr. Deven Krueger who has recommended cautiously monitoring his potassium and magnesium levels after his colonoscopy preparation to be sure he is not hypokalemic/hypomagnesemic. I have touched base with Dr. Hieu Lorenzo to request him to check his potassium and magnesium levels on the morning of his colonoscopy. If low, they must be repleted. His heart rhythm should be monitored during his colonoscopy. I will repeat his BMP and magnesium level 3 days after his procedure to ensure stability. The patient expressed understanding and is in agreement. Gayle Zepeda, since you told me he was quite "nasty" over the phone, I spoke with him about this. He became quite defensive and expressed that he was frustrated with doctors offices "not getting back to me," and other issues he has had with his PCPs office. I told him that I had spent time communicating with Dr. Deven Krueger, and as I was out on conference during the latter part of last week I was unable to get back to him. He expressed understanding. I reinforced the importance of expressing mutual respect while in communication and ensured him that his health was our #1 priority. I did tell him that continued disrespectful behavior phone with our staff may result in dismissal from the practice. He states he will "be nicer" from here on and will try to engage with our staff in a respectful way in the future.

## 2023-09-11 NOTE — TELEPHONE ENCOUNTER
Patient's pharmacy called because the medication escitalotran 10mg was prescribed to him. They state he does not take that medication. His wife does. This was just an error but this medication was filled under the [facility] chart and they wanted to report this information to the pcp. Note: the patient did not take this medication.

## 2023-09-11 NOTE — TELEPHONE ENCOUNTER
Patients GI provider:  Dr. Francis Reeder     Number to return call: [phone]    Reason for call: Pt calling requesting sooner appointment.     Scheduled procedure/appointment date if applicable: Appt. 63/93/13

## 2023-09-12 ENCOUNTER — TELEPHONE (OUTPATIENT)
Dept: GASTROENTEROLOGY | Facility: CLINIC | Age: 73
End: 2023-09-12

## 2023-09-12 DIAGNOSIS — F51.01 PRIMARY INSOMNIA: ICD-10-CM

## 2023-09-12 RX ORDER — ZOLPIDEM TARTRATE 5 MG/1
TABLET ORAL
Qty: 30 TABLET | Refills: 0 | OUTPATIENT
Start: 2023-09-12

## 2023-09-12 RX ORDER — ZOLPIDEM TARTRATE 5 MG/1
TABLET ORAL
Qty: 30 TABLET | Refills: 0 | Status: SHIPPED | OUTPATIENT
Start: 2023-09-12

## 2023-09-12 NOTE — TELEPHONE ENCOUNTER
Dr. Twan maharaj to [facility] 09/21/2023. I have msg'd him to confirm the time he would like the overbook entered. Awaiting his reply.

## 2023-09-12 NOTE — TELEPHONE ENCOUNTER
Scheduled date of colonoscopy (as of today): 10/05/2023  Physician performing colonoscopy: Dr. Chad Thompson   Location of colonoscopy: [facility]  Bowel prep reviewed with patient: Clenpiq, tracy mailed 09/12/2023  Instructions reviewed with patient by:  Danika Campos   Clearances: Per Cardiology, okay to procedure w/ stipulations - see notes between Thomas Douglas and [employer] [facility]. Pradaxa - clearance to hold requested 09/15/2023    Pt Spouse to have colonoscopy same day.  7:30 and 8 am.

## 2023-09-13 ENCOUNTER — TELEPHONE (OUTPATIENT)
Dept: GASTROENTEROLOGY | Facility: CLINIC | Age: 73
End: 2023-09-13

## 2023-09-13 DIAGNOSIS — I45.81 LONG QT SYNDROME TYPE 2: Primary | ICD-10-CM

## 2023-09-13 NOTE — TELEPHONE ENCOUNTER
Patient has been made aware but still argued the fact that he made his message "explicitly clear" that this medication was for his wife. I explained to him that yes, even though his message may have been clear, medication will not be distributed to the correct patient while doing it this way. I suggested he refill these on her mychart or call into the office to refill meds on her behalf.

## 2023-09-13 NOTE — TELEPHONE ENCOUNTER
----- Message from Bart Fishman MD sent at 9/12/2023 10:02 PM EDT -----  1pm is ok. Thanks!    ----- Message -----  From: Keyonna Vides  Sent: 9/12/2023   9:36 AM EDT  To: Bart Fishman MD    Hi Dr. Shana Walker,     Just a couple of updates. The msg below indicates a colonoscopy on 09/18/2023, the procedure is actually scheduled for 10/05/23. As for the overbooking you approved for  And Mrs. Mendez Fish, I  had asked about noon, there are already pts scheduled at 12:20 and 12:40. Is it okay if I scheduled them for 1? (Note: no one is scheduled for noon but there is only one spot available). Thanks, Ila Goodrich   ----- Message -----  From: Vickey Nair  Sent: 9/12/2023   8:01 AM EDT  To: #    Schedulers, this was sent to me , I believe this might be info for clearance ? If not Dr. Lona Haq was attached you may close out message  ----- Message -----  From: Vickey Nair  Sent: 9/12/2023   7:59 AM EDT  To: #      ----- Message -----  From: Ze Walters DO  Sent: 9/11/2023   1:52 PM EDT  To: Vickey Nair; Bart Fishman MD; James Stacy MD    Hey Dr. Shana Walker,     This pt has a colonoscopy on 9/18. He has long QT syndrome. Just a couple recommendations. 1) the prep may cause hypokalemia and hypomagnesemia and in turn cause higher QT intervals or torsades. Can you please check his magnesium and potassium levels the morning of his colonoscopy? If low, he would really need to be repleted before you start the colonoscopy. You can continue potassium/mag fluids during the procedure to keep him repleted. 2) I will repeat his BMP a few days after the procedure to make sure his potassium and magnesium stays stable. You do not have to do anything for that. He will call me to discuss results over the phone afterward. 3) anesthesia should not be an issue. He has an ICD, and anesthesia can watch him on telemetry at the time of his colonoscopy.       Please touch base with me or Dr. Tommy Palacios if you have any questions or concerns. Thank so much.     [facility]

## 2023-09-13 NOTE — TELEPHONE ENCOUNTER
JOVANNA for pt confirming appt time of 1pm.  He and his wife will have back to back appointments, okay per Dr. Pedro Guzman.

## 2023-09-14 ENCOUNTER — TELEPHONE (OUTPATIENT)
Dept: GASTROENTEROLOGY | Facility: CLINIC | Age: 73
End: 2023-09-14

## 2023-09-14 ENCOUNTER — REMOTE DEVICE CLINIC VISIT (OUTPATIENT)
Dept: CARDIOLOGY CLINIC | Facility: CLINIC | Age: 73
End: 2023-09-14
Payer: MEDICARE

## 2023-09-14 DIAGNOSIS — Z95.810 AICD (AUTOMATIC CARDIOVERTER/DEFIBRILLATOR) PRESENT: Primary | ICD-10-CM

## 2023-09-14 PROCEDURE — 93295 DEV INTERROG REMOTE 1/2/MLT: CPT | Performed by: INTERNAL MEDICINE

## 2023-09-14 PROCEDURE — 93296 REM INTERROG EVL PM/IDS: CPT | Performed by: INTERNAL MEDICINE

## 2023-09-14 NOTE — TELEPHONE ENCOUNTER
I called and lmom advising pt about blood work prior to procedures asked pt to return our call with any questions

## 2023-09-14 NOTE — PROGRESS NOTES
Results for orders placed or performed in visit on 09/14/23   Cardiac EP device report    Narrative    MDT BI-V ICD (DDIR) ACTIVE SYSTEM IS MRI CONDITIONAL  CARELINK TRANSMISSION: BATTERY STATUS "6 YRS." AP 99% CRT PACINIG (BIV) 100% (LV) 0%. ALL AVAILABLE LEAD PARAMETERS WITHIN NORMAL LIMITS. NO SIGNIFICANT HIGH RATE EPISODES. VENT SENSING MARKERS, PRESENT AS FUSION & PVCS. PT ON NADOLOL & PRADAXA. EF 45% (ECHO 2023). OPTI-VOL WITHIN NORMAL LIMITS. NORMAL DEVICE FUNCTION.  NC

## 2023-09-15 ENCOUNTER — TELEPHONE (OUTPATIENT)
Dept: GASTROENTEROLOGY | Facility: CLINIC | Age: 73
End: 2023-09-15

## 2023-09-15 DIAGNOSIS — K63.5 POLYP OF COLON, UNSPECIFIED PART OF COLON, UNSPECIFIED TYPE: Primary | ICD-10-CM

## 2023-09-15 NOTE — TELEPHONE ENCOUNTER
Our mutual patient is scheduled for procedure: Colonoscopy    On: _10___/_  05  _/_  21  _      With: Dr. Garcia________    He/She is taking the following blood thinner:   Pradaxa       Can this be stopped __2____ days prior to the procedure?       Physician Approving clearance: ________________________

## 2023-09-16 RX ORDER — SODIUM PICOSULFATE, MAGNESIUM OXIDE, AND ANHYDROUS CITRIC ACID 12; 3.5; 1 G/175ML; G/175ML; MG/175ML
LIQUID ORAL
Qty: 350 ML | Refills: 0 | Status: SHIPPED | OUTPATIENT
Start: 2023-09-16

## 2023-09-18 NOTE — TELEPHONE ENCOUNTER
Our mutual patient is scheduled for procedure: Colonoscopy     On: _10___/_  05  _/_  21  _       With: Dr. Garcia________     He/She is taking the following blood thinner:       Pradaxa                                       Can this be stopped __2____ days prior to the procedure?        Physician Approving clearance: __YES

## 2023-09-21 ENCOUNTER — OFFICE VISIT (OUTPATIENT)
Dept: GASTROENTEROLOGY | Facility: CLINIC | Age: 73
End: 2023-09-21
Payer: MEDICARE

## 2023-09-21 VITALS
WEIGHT: 196 LBS | TEMPERATURE: 98.1 F | HEIGHT: 68 IN | SYSTOLIC BLOOD PRESSURE: 116 MMHG | DIASTOLIC BLOOD PRESSURE: 52 MMHG | BODY MASS INDEX: 29.7 KG/M2

## 2023-09-21 DIAGNOSIS — D50.9 IRON DEFICIENCY ANEMIA, UNSPECIFIED IRON DEFICIENCY ANEMIA TYPE: Chronic | ICD-10-CM

## 2023-09-21 DIAGNOSIS — K21.9 GASTROESOPHAGEAL REFLUX DISEASE WITHOUT ESOPHAGITIS: ICD-10-CM

## 2023-09-21 DIAGNOSIS — K31.819 GAVE (GASTRIC ANTRAL VASCULAR ECTASIA): ICD-10-CM

## 2023-09-21 DIAGNOSIS — K63.5 POLYP OF COLON, UNSPECIFIED PART OF COLON, UNSPECIFIED TYPE: Primary | ICD-10-CM

## 2023-09-21 PROCEDURE — 99214 OFFICE O/P EST MOD 30 MIN: CPT | Performed by: INTERNAL MEDICINE

## 2023-09-21 NOTE — PROGRESS NOTES
[facility] - Outpatient Follow-up Note  Soni Mistry 72 y.o. male MRN: [id number]  Encounter: [id number]          ASSESSMENT AND PLAN:      1. Polyp of colon, unspecified part of colon, unspecified type  He has a history of colon polyps and is due for surveillance colonoscopy so we will schedule this for him at his earliest convenience. 2. Gastroesophageal reflux disease without esophagitis  He has a history of reflux we encouraged him to continue the omeprazole daily. 3. GAVE (gastric antral vascular ectasia)  4. Iron deficiency anemia, unspecified iron deficiency anemia type  He has a history of gastric antral vascular ectasia contributing to his iron deficiency anemia. If his anemia or iron deficiency worsen in the future, we will repeat his upper endoscopy to retreat his gastric antral vascular ectasia.    ______________________________________________________________________    SUBJECTIVE: He presents for follow-up of his history of colon polyps and his history of gastric antral vascular ectasia contributing to iron deficiency anemia. He is due for surveillance colonoscopy and denies any bleeding, abdominal pain, change in bowel habits, or weight loss. He has a history of reflux but this has been well controlled on omeprazole daily. His last upper endoscopy was notable for gastric antral vascular ectasia which was treated with argon plasma coagulation. His hemoglobin and iron panel have been stable since then. REVIEW OF SYSTEMS IS OTHERWISE NEGATIVE.       Historical Information   Past Medical History:   Diagnosis Date    Anxiety     BPH (benign prostatic hypertrophy)     Cancer (HCC)     CLL    CLL (chronic lymphocytic leukemia) ([address])     2009    Colon polyp     Concussion     Resolved: 08/22/16    Depression     Diverticulitis 01/13/2020 2014 CT done 11/19 12/19 CT done     E coli bacteremia 06/27/2021 2/2 blood cultures with [facility] [address] appears to be the urine     Epididymitis 05/19/2021 5/2021    Gastrointestinal hemorrhage     Last assessed: 08/27/13    GERD (gastroesophageal reflux disease)     H/O vitamin D deficiency 07/07/2021    Hearing loss of aging     Hiatal hernia     History of right hip replacement 04/19/2021    History of transfusion     Hyperlipidemia     Hypertension     Hyponatremia 03/06/2023    Iron deficiency anemia     Microscopic hematuria     Last assessed: 06/28/13    OA (osteoarthritis)     right hip    Obesity (BMI 30.0-34.9) 04/07/2022    Pneumothorax     Primary osteoarthritis of right hip 09/29/2017    He is status post right hip arthroplasty    Prostatitis     Pulmonary hypertension (HCC)     QT prolongation     Seasonal allergies     Sepsis ([address]) 03/06/2023    Unresponsive episode 03/07/2023    Urinary tract infection associated with catheterization of urinary tract ([address]) 02/05/2021    Pseudomonal UTI asymptomatic. Per Urology do not treat at this time. Urinary tract infection associated with catheterization of urinary tract ([address]) 02/05/2021    Pseudomonal UTI asymptomatic. Per Urology do not treat at this time. He did have urosepsis from E coli 7/21    UTI (urinary tract infection)     in past    Wears glasses      Past Surgical History:   Procedure Laterality Date    BLADDER SURGERY      CARDIAC CATHETERIZATION Left 3/7/2023    Procedure: Cardiac Left Heart Cath;  Surgeon: Brenda Plummer MD;  [facility]; Service: Cardiology    CARDIAC CATHETERIZATION N/A 3/7/2023    Procedure: Cardiac temporary pacemaker;  Surgeon: Brenda Plummer MD;  [facility]; Service: Cardiology    CARDIAC ELECTROPHYSIOLOGY PROCEDURE N/A 3/8/2023    Procedure: Cardiac icd implant;  Surgeon: Isabel Gomez MD;  [facility];   Service: Cardiology    COLONOSCOPY      CYSTOSCOPY  10/09/2014    Diagnostic    CYSTOSCOPY  06/29/2020    FL CYSTOGRAM  08/15/2022    FRACTURE SURGERY      left lower arm    FRACTURE SURGERY      left femur    HERNIA REPAIR      JOINT REPLACEMENT Right     hip    OTHER SURGICAL HISTORY  2011    Spinal anesthesia epidural    [place] ARTHRP ACETBLR/PROX FEM PROSTC AGRFT/ALGRFT Right 2017    Procedure: ARTHROPLASTY HIP TOTAL ANTERIOR;  Surgeon: Jonathan Quintero MD;  [facility];  Service: Orthopedics    TONSILLECTOMY AND ADENOIDECTOMY      TRANSURETHRAL RESECTION OF PROSTATE      x 2    WRIST SURGERY       Social History   Social History     Substance and Sexual Activity   Alcohol Use Not Currently    Alcohol/week: 10.0 standard drinks of alcohol    Types: 10 Cans of beer per week    Comment: no more drinking     Social History     Substance and Sexual Activity   Drug Use Not Currently    Types: Marijuana    Comment: "medical marijuana"     Social History     Tobacco Use   Smoking Status Former    Packs/day: 1.00    Years: 15.00    Total pack years: 15.00    Types: Cigarettes    Start date:     Quit date: 1980    Years since quittin.0   Smokeless Tobacco Never     Family History   Problem Relation Age of Onset    No Known Problems Mother     Heart attack Father     Diabetes Brother     Prostate cancer Brother        Meds/Allergies       Current Outpatient Medications:     Acetaminophen 500 MG    ascorbic acid (VITAMIN C) 250 MG CHEW    atorvastatin (LIPITOR) 20 mg tablet    Blood Pressure Monitoring (Omron 5 Series BP Monitor) PEE    busPIRone (BUSPAR) 7.5 mg tablet    dabigatran etexilate (PRADAXA) 150 mg capsu    docusate sodium (COLACE) 100 mg capsule    Lactobacillus (PROBIOTIC ACIDOPHILUS PO)    LORazepam (Ativan) 0.5 mg tablet    losartan (COZAAR) 50 mg tablet    montelukast (SINGULAIR) 10 mg tablet    nadolol (CORGARD) 80 MG tablet    Omega-3 Fatty Acids (FISH OIL) 1,000 mg    omeprazole (PriLOSEC) 40 MG capsule    sodium picosulfate, magnesium oxide, citric acid (Clenpiq) oral solution    spironolactone (ALDACTONE) 25 mg tablet    vitamin B-12 (CYANOCOBALAMIN) 500 MCG TABS    zolpidem (AMBIEN) 5 mg tablet    Allergies   Allergen Reactions    Ciprofloxacin Other (See Comments)     LONG QT syndrome    Codeine Other (See Comments)     agitated    Sulfamethoxazole-Trimethoprim GI Intolerance, Abdominal Pain and Other (See Comments)     upset stomach           Objective     Blood pressure 116/52, temperature 98.1 °F (36.7 °C), temperature source Tympanic, height 5' 8" (1.727 m), weight 88.9 kg (196 lb). Body mass index is 29.8 kg/m². PHYSICAL EXAM:      General Appearance:   Alert, cooperative, no distress   HEENT:   Normocephalic, atraumatic, anicteric. Neck:  Supple, symmetrical, trachea midline   Lungs:   Clear to auscultation bilaterally; no rales, rhonchi or wheezing; respirations unlabored    Heart[de-identified]   Regular rate and rhythm; no murmur, rub, or gallop. Abdomen:   Soft, non-tender, non-distended; normal bowel sounds; no masses, no organomegaly    Genitalia:   Deferred    Rectal:   Deferred    Extremities:  No cyanosis, clubbing or edema    Pulses:  2+ and symmetric    Skin:  No jaundice, rashes, or lesions    Lymph nodes:  No palpable cervical lymphadenopathy        Lab Results:   No visits with results within 1 Day(s) from this visit.    Latest known visit with results is:   Hospital Outpatient Visit on 08/21/2023   Component Date Value    A4C EF 08/21/2023 46     LV Diastolic Volume (BP) 18/07/6861 962     LV Systolic Volume (BP) 22/24/1594 130     EF 08/21/2023 45     AV LVOT peak gradient 08/21/2023 3     LVOT peak VTI 08/21/2023 19.06     LVOT peak adithya 08/21/2023 0.92     Aortic valve peak veloci* 08/21/2023 1.4     Ao VTI 08/21/2023 28.62     AV mean gradient 08/21/2023 4     LVOT mn grad 08/21/2023 2.0     AV peak gradient 08/21/2023 8     AV Deceleration Time 08/21/2023 1,832     AV regurgitation pressur* 08/21/2023 531     TR Peak Adithya 08/21/2023 3.0     Triscuspid Valve Regurgi* 08/21/2023 37.0     AV peak gradient 08/21/2023 59     Aortic valve mean veloci* 08/21/2023 9.60     Tricuspid valve peak reg* 08/21/2023 3.02     DVI 08/21/2023 0.66     LV EF 08/21/2023 45          Radiology Results:   Cardiac EP device report    Result Date: 9/14/2023  Narrative: MDT BI-V ICD (DDIR) ACTIVE SYSTEM IS MRI CONDITIONAL CARELINK TRANSMISSION: BATTERY STATUS "6 YRS." AP 99% CRT PACINIG (BIV) 100% (LV) 0%. ALL AVAILABLE LEAD PARAMETERS WITHIN NORMAL LIMITS. NO SIGNIFICANT HIGH RATE EPISODES. VENT SENSING MARKERS, PRESENT AS FUSION & PVCS. PT ON NADOLOL & PRADAXA. EF 45% (ECHO 2023). OPTI-VOL WITHIN NORMAL LIMITS. NORMAL DEVICE FUNCTION.  NC

## 2023-09-21 NOTE — H&P (VIEW-ONLY)
[facility] - Outpatient Follow-up Note  Nidia Vu 72 y.o. male MRN: [id number]  Encounter: [id number]          ASSESSMENT AND PLAN:      1. Polyp of colon, unspecified part of colon, unspecified type  He has a history of colon polyps and is due for surveillance colonoscopy so we will schedule this for him at his earliest convenience. 2. Gastroesophageal reflux disease without esophagitis  He has a history of reflux we encouraged him to continue the omeprazole daily. 3. GAVE (gastric antral vascular ectasia)  4. Iron deficiency anemia, unspecified iron deficiency anemia type  He has a history of gastric antral vascular ectasia contributing to his iron deficiency anemia. If his anemia or iron deficiency worsen in the future, we will repeat his upper endoscopy to retreat his gastric antral vascular ectasia.    ______________________________________________________________________    SUBJECTIVE: He presents for follow-up of his history of colon polyps and his history of gastric antral vascular ectasia contributing to iron deficiency anemia. He is due for surveillance colonoscopy and denies any bleeding, abdominal pain, change in bowel habits, or weight loss. He has a history of reflux but this has been well controlled on omeprazole daily. His last upper endoscopy was notable for gastric antral vascular ectasia which was treated with argon plasma coagulation. His hemoglobin and iron panel have been stable since then. REVIEW OF SYSTEMS IS OTHERWISE NEGATIVE.       Historical Information   Past Medical History:   Diagnosis Date    Anxiety     BPH (benign prostatic hypertrophy)     Cancer (HCC)     CLL    CLL (chronic lymphocytic leukemia) ([address])     2009    Colon polyp     Concussion     Resolved: 08/22/16    Depression     Diverticulitis 01/13/2020 2014 CT done 11/19 12/19 CT done     E coli bacteremia 06/27/2021 2/2 blood cultures with [address] appears to be the urine     Epididymitis 05/19/2021 5/2021    Gastrointestinal hemorrhage     Last assessed: 08/27/13    GERD (gastroesophageal reflux disease)     H/O vitamin D deficiency 07/07/2021    Hearing loss of aging     Hiatal hernia     History of right hip replacement 04/19/2021    History of transfusion     Hyperlipidemia     Hypertension     Hyponatremia 03/06/2023    Iron deficiency anemia     Microscopic hematuria     Last assessed: 06/28/13    OA (osteoarthritis)     right hip    Obesity (BMI 30.0-34.9) 04/07/2022    Pneumothorax     Primary osteoarthritis of right hip 09/29/2017    He is status post right hip arthroplasty    Prostatitis     Pulmonary hypertension (HCC)     QT prolongation     Seasonal allergies     Sepsis ([address]) 03/06/2023    Unresponsive episode 03/07/2023    Urinary tract infection associated with catheterization of urinary tract ([address]) 02/05/2021    Pseudomonal UTI asymptomatic. Per Urology do not treat at this time. Urinary tract infection associated with catheterization of urinary tract ([address]) 02/05/2021    Pseudomonal UTI asymptomatic. Per Urology do not treat at this time. He did have urosepsis from E coli 7/21    UTI (urinary tract infection)     in past    Wears glasses      Past Surgical History:   Procedure Laterality Date    BLADDER SURGERY      CARDIAC CATHETERIZATION Left 3/7/2023    Procedure: Cardiac Left Heart Cath;  Surgeon: Mihai Bhatt MD;  [facility]; Service: Cardiology    CARDIAC CATHETERIZATION N/A 3/7/2023    Procedure: Cardiac temporary pacemaker;  Surgeon: Mihai Bhatt MD;  [facility]; Service: Cardiology    CARDIAC ELECTROPHYSIOLOGY PROCEDURE N/A 3/8/2023    Procedure: Cardiac icd implant;  Surgeon: Maxim Ochoa MD;  [facility];   Service: Cardiology    COLONOSCOPY      CYSTOSCOPY  10/09/2014    Diagnostic    CYSTOSCOPY  06/29/2020    FL CYSTOGRAM  08/15/2022    FRACTURE SURGERY      left lower arm    FRACTURE SURGERY      left femur    HERNIA REPAIR      JOINT REPLACEMENT Right     hip    OTHER SURGICAL HISTORY  2011    Spinal anesthesia epidural    [place] ARTHRP ACETBLR/PROX FEM PROSTC AGRFT/ALGRFT Right 2017    Procedure: ARTHROPLASTY HIP TOTAL ANTERIOR;  Surgeon: Jorge Knox MD;  [facility];  Service: Orthopedics    TONSILLECTOMY AND ADENOIDECTOMY      TRANSURETHRAL RESECTION OF PROSTATE      x 2    WRIST SURGERY       Social History   Social History     Substance and Sexual Activity   Alcohol Use Not Currently    Alcohol/week: 10.0 standard drinks of alcohol    Types: 10 Cans of beer per week    Comment: no more drinking     Social History     Substance and Sexual Activity   Drug Use Not Currently    Types: Marijuana    Comment: "medical marijuana"     Social History     Tobacco Use   Smoking Status Former    Packs/day: 1.00    Years: 15.00    Total pack years: 15.00    Types: Cigarettes    Start date:     Quit date: 1980    Years since quittin.0   Smokeless Tobacco Never     Family History   Problem Relation Age of Onset    No Known Problems Mother     Heart attack Father     Diabetes Brother     Prostate cancer Brother        Meds/Allergies       Current Outpatient Medications:     Acetaminophen 500 MG    ascorbic acid (VITAMIN C) 250 MG CHEW    atorvastatin (LIPITOR) 20 mg tablet    Blood Pressure Monitoring (Omron 5 Series BP Monitor) PEE    busPIRone (BUSPAR) 7.5 mg tablet    dabigatran etexilate (PRADAXA) 150 mg capsu    docusate sodium (COLACE) 100 mg capsule    Lactobacillus (PROBIOTIC ACIDOPHILUS PO)    LORazepam (Ativan) 0.5 mg tablet    losartan (COZAAR) 50 mg tablet    montelukast (SINGULAIR) 10 mg tablet    nadolol (CORGARD) 80 MG tablet    Omega-3 Fatty Acids (FISH OIL) 1,000 mg    omeprazole (PriLOSEC) 40 MG capsule    sodium picosulfate, magnesium oxide, citric acid (Clenpiq) oral solution    spironolactone (ALDACTONE) 25 mg tablet    vitamin B-12 (CYANOCOBALAMIN) 500 MCG TABS    zolpidem (AMBIEN) 5 mg tablet    Allergies   Allergen Reactions    Ciprofloxacin Other (See Comments)     LONG QT syndrome    Codeine Other (See Comments)     agitated    Sulfamethoxazole-Trimethoprim GI Intolerance, Abdominal Pain and Other (See Comments)     upset stomach           Objective     Blood pressure 116/52, temperature 98.1 °F (36.7 °C), temperature source Tympanic, height 5' 8" (1.727 m), weight 88.9 kg (196 lb). Body mass index is 29.8 kg/m². PHYSICAL EXAM:      General Appearance:   Alert, cooperative, no distress   HEENT:   Normocephalic, atraumatic, anicteric. Neck:  Supple, symmetrical, trachea midline   Lungs:   Clear to auscultation bilaterally; no rales, rhonchi or wheezing; respirations unlabored    Heart[de-identified]   Regular rate and rhythm; no murmur, rub, or gallop. Abdomen:   Soft, non-tender, non-distended; normal bowel sounds; no masses, no organomegaly    Genitalia:   Deferred    Rectal:   Deferred    Extremities:  No cyanosis, clubbing or edema    Pulses:  2+ and symmetric    Skin:  No jaundice, rashes, or lesions    Lymph nodes:  No palpable cervical lymphadenopathy        Lab Results:   No visits with results within 1 Day(s) from this visit.    Latest known visit with results is:   Hospital Outpatient Visit on 08/21/2023   Component Date Value    A4C EF 08/21/2023 46     LV Diastolic Volume (BP) 39/03/4145 361     LV Systolic Volume (BP) 84/71/0680 130     EF 08/21/2023 45     AV LVOT peak gradient 08/21/2023 3     LVOT peak VTI 08/21/2023 19.06     LVOT peak adithya 08/21/2023 0.92     Aortic valve peak veloci* 08/21/2023 1.4     Ao VTI 08/21/2023 28.62     AV mean gradient 08/21/2023 4     LVOT mn grad 08/21/2023 2.0     AV peak gradient 08/21/2023 8     AV Deceleration Time 08/21/2023 1,832     AV regurgitation pressur* 08/21/2023 531     TR Peak Adithya 08/21/2023 3.0     Triscuspid Valve Regurgi* 08/21/2023 37.0     AV peak gradient 08/21/2023 59     Aortic valve mean veloci* 08/21/2023 9.60     Tricuspid valve peak reg* 08/21/2023 3.02     DVI 08/21/2023 0.66     LV EF 08/21/2023 45          Radiology Results:   Cardiac EP device report    Result Date: 9/14/2023  Narrative: MDT BI-V ICD (DDIR) ACTIVE SYSTEM IS MRI CONDITIONAL CARELINK TRANSMISSION: BATTERY STATUS "6 YRS." AP 99% CRT PACINIG (BIV) 100% (LV) 0%. ALL AVAILABLE LEAD PARAMETERS WITHIN NORMAL LIMITS. NO SIGNIFICANT HIGH RATE EPISODES. VENT SENSING MARKERS, PRESENT AS FUSION & PVCS. PT ON NADOLOL & PRADAXA. EF 45% (ECHO 2023). OPTI-VOL WITHIN NORMAL LIMITS. NORMAL DEVICE FUNCTION.  NC

## 2023-09-22 ENCOUNTER — TELEPHONE (OUTPATIENT)
Dept: HEMATOLOGY ONCOLOGY | Facility: CLINIC | Age: 73
End: 2023-09-22

## 2023-09-22 ENCOUNTER — TELEPHONE (OUTPATIENT)
Dept: FAMILY MEDICINE CLINIC | Facility: CLINIC | Age: 73
End: 2023-09-22

## 2023-09-22 NOTE — TELEPHONE ENCOUNTER
Patient Call    Who are you speaking with? self   If it is not the patient, are they listed on an active communication consent form? yes   What is the reason for this call? Please advise covid shots at [facility], get yes or no? Does this require a call back? yes   If a call back is required, please list best call back number [phone] or [phone]   If a call back is required, advise that a message will be forwarded to their care team and someone will return their call as soon as possible. Did you relay this information to the patient?  yes

## 2023-09-22 NOTE — TELEPHONE ENCOUNTER
Patient Call    Who are you speaking with? Patient    If it is not the patient, are they listed on an active communication consent form? N/A   What is the reason for this call? Patient is requesting a call back from Mnai Kimble is aware and stated that she will call the patient back later when she is available. Does this require a call back? Yes   If a call back is required, please list best call back number [phone]   If a call back is required, advise that a message will be forwarded to their care team and someone will return their call as soon as possible. Did you relay this information to the patient?  Yes

## 2023-09-22 NOTE — TELEPHONE ENCOUNTER
Mr. Cecilia Olvera call and would like for you to order him some Lab before his appointment 10/23/23 with you. The lab that he want is a Lipid Panel, A1C, and CBC.

## 2023-09-22 NOTE — TELEPHONE ENCOUNTER
Spoke with patient to address her concerns. Patient would like for Dr. Brandon Sapp to call him so that he can give his good wishes.

## 2023-09-22 NOTE — TELEPHONE ENCOUNTER
Appointment Change  Cancel, Reschedule, Change to Virtual      Who are you speaking with? self   If it is not the patient, is the caller listed on the communication consent form? self   Which provider is the appointment scheduled with? roseanne   When was the original appointment scheduled? Please list date and time 9/18   At which location is the appointment scheduled to take place? [place]   Was the appointment rescheduled? Was the appointment changed from an in person visit to a virtual visit? If so, please list the details of the change. 10/25 11:40am Proothi   What is the reason for the appointment change? NINOSKA       Was STAR transport scheduled? no   Does STAR transport need to be scheduled for the new visit (if applicable) no   Does the patient need an infusion appointment rescheduled? no   Does the patient have an upcoming infusion appointment scheduled? If so, when? no   Is the patient undergoing chemotherapy? no   For appointments cancelled with less than 24 hours:  Was the no-show policy reviewed?  yes

## 2023-09-24 DIAGNOSIS — I10 ESSENTIAL HYPERTENSION: Chronic | ICD-10-CM

## 2023-09-24 DIAGNOSIS — R73.09 ELEVATED GLUCOSE: Primary | ICD-10-CM

## 2023-09-24 DIAGNOSIS — E78.00 HYPERCHOLESTEROLEMIA: Chronic | ICD-10-CM

## 2023-09-25 ENCOUNTER — TELEPHONE (OUTPATIENT)
Dept: CARDIOLOGY CLINIC | Facility: CLINIC | Age: 73
End: 2023-09-25

## 2023-09-25 DIAGNOSIS — I45.81 LONG Q-T SYNDROME: Primary | ICD-10-CM

## 2023-09-25 DIAGNOSIS — E78.00 PURE HYPERCHOLESTEROLEMIA, UNSPECIFIED: ICD-10-CM

## 2023-09-25 RX ORDER — ATORVASTATIN CALCIUM 20 MG/1
TABLET, FILM COATED ORAL
Qty: 90 TABLET | Refills: 3 | Status: SHIPPED | OUTPATIENT
Start: 2023-09-25

## 2023-09-25 NOTE — PROGRESS NOTES
Labs placed for patient's upcoming visit.   Problem List Items Addressed This Visit        Cardiovascular and Mediastinum    Essential hypertension (Chronic)    Relevant Orders    CBC and differential    Comprehensive metabolic panel    Hemoglobin A1C       Other    Hypercholesterolemia (Chronic)    Relevant Orders    Hemoglobin A1C    Lipid Panel with Direct LDL reflex   Other Visit Diagnoses     Elevated glucose    -  Primary    Relevant Orders    Comprehensive metabolic panel    Hemoglobin A1C    Lipid Panel with Direct LDL reflex

## 2023-09-25 NOTE — TELEPHONE ENCOUNTER
Placed order, print out should come from [facility] printer. Can you keep it on my desk? I can give it to him tomorrow when he's here w/ his wife.   Uzair PORTER

## 2023-09-25 NOTE — TELEPHONE ENCOUNTER
PC from patient asking for the BMP you ordered on 9/11/23 needs to be marked as STAT due to colonoscopy. He is going to check with you on Weds when his wife is in to see Dr Jami Salazar that it was done.

## 2023-10-02 ENCOUNTER — TELEPHONE (OUTPATIENT)
Dept: GASTROENTEROLOGY | Facility: CLINIC | Age: 73
End: 2023-10-02

## 2023-10-02 DIAGNOSIS — I50.9 NEW ONSET OF CONGESTIVE HEART FAILURE (HCC): ICD-10-CM

## 2023-10-02 RX ORDER — LOSARTAN POTASSIUM 50 MG/1
50 TABLET ORAL DAILY
Qty: 90 TABLET | Refills: 1 | Status: SHIPPED | OUTPATIENT
Start: 2023-10-02

## 2023-10-02 NOTE — TELEPHONE ENCOUNTER
Called pt to discuss pradaxa hold. He is aware. Pt asked about lab work and requested it be drawn @ GI so he didn't have to go the outpatient location prior to procedure. I told him I would speak w/ the GI Lab and get back to him on Monday. Spoke with Maia Orozco, due to limitations, certain labs are unable to be drawn @ GI Lab, magnesium is one of them. Maia Orozco stated she would call the pt and review the protocol. Per Maia Orozco, she spoke w/ the pt and he is all set. Matter resolved.

## 2023-10-04 RX ORDER — SPIRONOLACTONE 25 MG/1
25 TABLET ORAL DAILY
Qty: 90 TABLET | Refills: 1 | Status: SHIPPED | OUTPATIENT
Start: 2023-10-04

## 2023-10-05 ENCOUNTER — ANESTHESIA EVENT (OUTPATIENT)
Dept: GASTROENTEROLOGY | Facility: HOSPITAL | Age: 73
End: 2023-10-05

## 2023-10-05 ENCOUNTER — ANESTHESIA (OUTPATIENT)
Dept: GASTROENTEROLOGY | Facility: HOSPITAL | Age: 73
End: 2023-10-05

## 2023-10-05 ENCOUNTER — HOSPITAL ENCOUNTER (OUTPATIENT)
Dept: GASTROENTEROLOGY | Facility: HOSPITAL | Age: 73
Setting detail: OUTPATIENT SURGERY
End: 2023-10-05
Attending: INTERNAL MEDICINE
Payer: MEDICARE

## 2023-10-05 VITALS
DIASTOLIC BLOOD PRESSURE: 65 MMHG | HEART RATE: 70 BPM | TEMPERATURE: 96.5 F | HEIGHT: 68 IN | SYSTOLIC BLOOD PRESSURE: 122 MMHG | BODY MASS INDEX: 28.79 KG/M2 | OXYGEN SATURATION: 98 % | WEIGHT: 190 LBS | RESPIRATION RATE: 17 BRPM

## 2023-10-05 DIAGNOSIS — K63.5 POLYP OF COLON, UNSPECIFIED PART OF COLON, UNSPECIFIED TYPE: ICD-10-CM

## 2023-10-05 DIAGNOSIS — D50.9 IRON DEFICIENCY ANEMIA, UNSPECIFIED IRON DEFICIENCY ANEMIA TYPE: ICD-10-CM

## 2023-10-05 LAB
ANION GAP SERPL CALCULATED.3IONS-SCNC: 8 MMOL/L
BUN SERPL-MCNC: 10 MG/DL (ref 5–25)
CALCIUM SERPL-MCNC: 9.4 MG/DL (ref 8.4–10.2)
CHLORIDE SERPL-SCNC: 98 MMOL/L (ref 96–108)
CO2 SERPL-SCNC: 29 MMOL/L (ref 21–32)
CREAT SERPL-MCNC: 0.8 MG/DL (ref 0.6–1.3)
GFR SERPL CREATININE-BSD FRML MDRD: 89 ML/MIN/1.73SQ M
GLUCOSE P FAST SERPL-MCNC: 109 MG/DL (ref 65–99)
GLUCOSE SERPL-MCNC: 109 MG/DL (ref 65–140)
MAGNESIUM SERPL-MCNC: 2.2 MG/DL (ref 1.9–2.7)
POTASSIUM SERPL-SCNC: 4.5 MMOL/L (ref 3.5–5.3)
SODIUM SERPL-SCNC: 135 MMOL/L (ref 135–147)

## 2023-10-05 PROCEDURE — 83735 ASSAY OF MAGNESIUM: CPT | Performed by: STUDENT IN AN ORGANIZED HEALTH CARE EDUCATION/TRAINING PROGRAM

## 2023-10-05 PROCEDURE — 88305 TISSUE EXAM BY PATHOLOGIST: CPT | Performed by: PATHOLOGY

## 2023-10-05 PROCEDURE — 80048 BASIC METABOLIC PNL TOTAL CA: CPT | Performed by: STUDENT IN AN ORGANIZED HEALTH CARE EDUCATION/TRAINING PROGRAM

## 2023-10-05 RX ORDER — PROPOFOL 10 MG/ML
INJECTION, EMULSION INTRAVENOUS AS NEEDED
Status: DISCONTINUED | OUTPATIENT
Start: 2023-10-05 | End: 2023-10-05

## 2023-10-05 RX ORDER — LIDOCAINE HYDROCHLORIDE 10 MG/ML
INJECTION, SOLUTION EPIDURAL; INFILTRATION; INTRACAUDAL; PERINEURAL AS NEEDED
Status: DISCONTINUED | OUTPATIENT
Start: 2023-10-05 | End: 2023-10-05

## 2023-10-05 RX ORDER — PROPOFOL 10 MG/ML
INJECTION, EMULSION INTRAVENOUS CONTINUOUS PRN
Status: DISCONTINUED | OUTPATIENT
Start: 2023-10-05 | End: 2023-10-05

## 2023-10-05 RX ORDER — SODIUM CHLORIDE 9 MG/ML
INJECTION, SOLUTION INTRAVENOUS CONTINUOUS PRN
Status: DISCONTINUED | OUTPATIENT
Start: 2023-10-05 | End: 2023-10-05

## 2023-10-05 RX ADMIN — PROPOFOL 20 MG: 10 INJECTION, EMULSION INTRAVENOUS at 08:17

## 2023-10-05 RX ADMIN — LIDOCAINE HYDROCHLORIDE 50 MG: 10 INJECTION, SOLUTION EPIDURAL; INFILTRATION; INTRACAUDAL; PERINEURAL at 08:16

## 2023-10-05 RX ADMIN — SODIUM CHLORIDE: 0.9 INJECTION, SOLUTION INTRAVENOUS at 08:07

## 2023-10-05 RX ADMIN — PROPOFOL 80 MG: 10 INJECTION, EMULSION INTRAVENOUS at 08:16

## 2023-10-05 RX ADMIN — PROPOFOL 100 MCG/KG/MIN: 10 INJECTION, EMULSION INTRAVENOUS at 08:16

## 2023-10-05 NOTE — ANESTHESIA PREPROCEDURE EVALUATION
Procedure:  COLONOSCOPY    Relevant Problems   CARDIO   (+) Chronic combined systolic and diastolic congestive heart failure (HCC)   (+) Essential hypertension   (+) Hypercholesterolemia   (+) Long QT syndrome type 2   (+) Paroxysmal A-fib (HCC)   (+) Ventricular fibrillation (HCC)      GI/HEPATIC   (+) Gastroesophageal reflux disease without esophagitis      /RENAL   (+) BPH with obstruction/lower urinary tract symptoms      HEMATOLOGY   (+) Autoimmune hemolytic anemia (HCC)   (+) Iron deficiency anemia      MUSCULOSKELETAL   (+) DDD (degenerative disc disease), cervical      NEURO/PSYCH   (+) Depression, recurrent (HCC)      PULMONARY   (+) DIXIE not currently treated      Other   (+) CLL (chronic lymphocytic leukemia) ([address])   medtronic BiV ICD     8/2023: EF 45, normal biventricular systolic function, mild AI, mild TR    3/2023: LHC: pLAD 40         Anesthesia Plan  ASA Score- 4     Anesthesia Type- IV sedation with anesthesia with ASA Monitors. Additional Monitors:   Airway Plan:     Comment: IV sedation,  standard ASA monitors. Risks and benefits discussed with patient; patient consented and agrees to proceed. I saw and evaluated the patient. If seen with CRNA, we have discussed the anesthetic plan and I am in agreement that the plan is appropriate for the patient. .       Plan Factors-    Chart reviewed. Existing labs reviewed. Induction- intravenous. Postoperative Plan-     Informed Consent- Anesthetic plan and risks discussed with patient. I personally reviewed this patient with the CRNA. Discussed and agreed on the Anesthesia Plan with the CRNA. Shelley Meehan

## 2023-10-05 NOTE — INTERVAL H&P NOTE
H&P reviewed. After examining the patient I find no changes in the patients condition since the H&P had been written.     Vitals:    10/05/23 0709   BP: 155/80   Pulse: 70   Resp: 18   Temp: (!) 97.2 °F (36.2 °C)   SpO2: 99%

## 2023-10-09 ENCOUNTER — APPOINTMENT (OUTPATIENT)
Dept: LAB | Facility: IMAGING CENTER | Age: 73
End: 2023-10-09
Payer: MEDICARE

## 2023-10-09 LAB
ANION GAP SERPL CALCULATED.3IONS-SCNC: 7 MMOL/L
BUN SERPL-MCNC: 13 MG/DL (ref 5–25)
CALCIUM SERPL-MCNC: 9.3 MG/DL (ref 8.4–10.2)
CHLORIDE SERPL-SCNC: 100 MMOL/L (ref 96–108)
CO2 SERPL-SCNC: 29 MMOL/L (ref 21–32)
CREAT SERPL-MCNC: 0.83 MG/DL (ref 0.6–1.3)
GFR SERPL CREATININE-BSD FRML MDRD: 88 ML/MIN/1.73SQ M
GLUCOSE SERPL-MCNC: 99 MG/DL (ref 65–140)
MAGNESIUM SERPL-MCNC: 2 MG/DL (ref 1.9–2.7)
POTASSIUM SERPL-SCNC: 4.5 MMOL/L (ref 3.5–5.3)
SODIUM SERPL-SCNC: 136 MMOL/L (ref 135–147)

## 2023-10-09 PROCEDURE — 88305 TISSUE EXAM BY PATHOLOGIST: CPT | Performed by: PATHOLOGY

## 2023-10-09 NOTE — RESULT ENCOUNTER NOTE
Polyps were adenomas (precancerous but no cancer) so repeat colonoscopy in 3 years. This was communicated via [address] [place] [address].

## 2023-10-10 ENCOUNTER — TELEPHONE (OUTPATIENT)
Dept: CARDIOLOGY CLINIC | Facility: CLINIC | Age: 73
End: 2023-10-10

## 2023-10-10 NOTE — TELEPHONE ENCOUNTER
Charlee Benito your msg and he was telling me that since his colonoscopy last week his blood pressures have been over 145/80. He is very worried. I told him to take his blood pressures in the morning after his pills and if they are consistently over 145/80 for a week to call us back and let us know.

## 2023-10-10 NOTE — TELEPHONE ENCOUNTER
----- Message from Lurdes Osuna DO sent at 10/10/2023  1:56 PM EDT -----  Please let pt know labs done yesterday looked good, specifically the potassium and magnesium levels that we were watching.   Thanks    RP  ----- Message -----  From: Lab, Background User  Sent: 10/9/2023   5:03 PM EDT  To: Lurdes Osuna DO

## 2023-10-19 ENCOUNTER — TELEPHONE (OUTPATIENT)
Dept: HEMATOLOGY ONCOLOGY | Facility: CLINIC | Age: 73
End: 2023-10-19

## 2023-10-19 ENCOUNTER — APPOINTMENT (OUTPATIENT)
Dept: LAB | Age: 73
End: 2023-10-19
Payer: MEDICARE

## 2023-10-19 DIAGNOSIS — E78.00 HYPERCHOLESTEROLEMIA: Chronic | ICD-10-CM

## 2023-10-19 DIAGNOSIS — C91.10 CLL (CHRONIC LYMPHOCYTIC LEUKEMIA) (HCC): ICD-10-CM

## 2023-10-19 DIAGNOSIS — I10 ESSENTIAL HYPERTENSION: Chronic | ICD-10-CM

## 2023-10-19 DIAGNOSIS — R73.09 ELEVATED GLUCOSE: ICD-10-CM

## 2023-10-19 LAB
ALBUMIN SERPL BCP-MCNC: 4.5 G/DL (ref 3.5–5)
ALP SERPL-CCNC: 90 U/L (ref 34–104)
ALT SERPL W P-5'-P-CCNC: 24 U/L (ref 7–52)
ANION GAP SERPL CALCULATED.3IONS-SCNC: 7 MMOL/L
ANISOCYTOSIS BLD QL SMEAR: PRESENT
AST SERPL W P-5'-P-CCNC: 21 U/L (ref 13–39)
BASOPHILS # BLD MANUAL: 0 THOUSAND/UL (ref 0–0.1)
BASOPHILS NFR MAR MANUAL: 0 % (ref 0–1)
BILIRUB SERPL-MCNC: 0.62 MG/DL (ref 0.2–1)
BUN SERPL-MCNC: 15 MG/DL (ref 5–25)
CALCIUM SERPL-MCNC: 9.4 MG/DL (ref 8.4–10.2)
CHLORIDE SERPL-SCNC: 101 MMOL/L (ref 96–108)
CHOLEST SERPL-MCNC: 123 MG/DL
CO2 SERPL-SCNC: 28 MMOL/L (ref 21–32)
CREAT SERPL-MCNC: 0.91 MG/DL (ref 0.6–1.3)
EOSINOPHIL # BLD MANUAL: 0 THOUSAND/UL (ref 0–0.4)
EOSINOPHIL NFR BLD MANUAL: 0 % (ref 0–6)
ERYTHROCYTE [DISTWIDTH] IN BLOOD BY AUTOMATED COUNT: 14.6 % (ref 11.6–15.1)
EST. AVERAGE GLUCOSE BLD GHB EST-MCNC: 120 MG/DL
GFR SERPL CREATININE-BSD FRML MDRD: 84 ML/MIN/1.73SQ M
GLUCOSE P FAST SERPL-MCNC: 111 MG/DL (ref 65–99)
HBA1C MFR BLD: 5.8 %
HCT VFR BLD AUTO: 39.8 % (ref 36.5–49.3)
HDLC SERPL-MCNC: 29 MG/DL
HGB BLD-MCNC: 12.6 G/DL (ref 12–17)
LDLC SERPL CALC-MCNC: 70 MG/DL (ref 0–100)
LG PLATELETS BLD QL SMEAR: PRESENT
LYMPHOCYTES # BLD AUTO: 44.3 THOUSAND/UL (ref 0.6–4.47)
LYMPHOCYTES # BLD AUTO: 92 % (ref 14–44)
MCH RBC QN AUTO: 32.1 PG (ref 26.8–34.3)
MCHC RBC AUTO-ENTMCNC: 31.7 G/DL (ref 31.4–37.4)
MCV RBC AUTO: 101 FL (ref 82–98)
MONOCYTES # BLD AUTO: 0.48 THOUSAND/UL (ref 0–1.22)
MONOCYTES NFR BLD: 1 % (ref 4–12)
NEUTROPHILS # BLD MANUAL: 3.37 THOUSAND/UL (ref 1.85–7.62)
NEUTS SEG NFR BLD AUTO: 7 % (ref 43–75)
OVALOCYTES BLD QL SMEAR: PRESENT
PLATELET # BLD AUTO: 226 THOUSANDS/UL (ref 149–390)
PLATELET BLD QL SMEAR: ADEQUATE
PMV BLD AUTO: 10.7 FL (ref 8.9–12.7)
POLYCHROMASIA BLD QL SMEAR: PRESENT
POTASSIUM SERPL-SCNC: 5.1 MMOL/L (ref 3.5–5.3)
PROT SERPL-MCNC: 7.7 G/DL (ref 6.4–8.4)
RBC # BLD AUTO: 3.93 MILLION/UL (ref 3.88–5.62)
SMUDGE CELLS BLD QL SMEAR: PRESENT
SODIUM SERPL-SCNC: 136 MMOL/L (ref 135–147)
TRIGL SERPL-MCNC: 120 MG/DL
WBC # BLD AUTO: 48.15 THOUSAND/UL (ref 4.31–10.16)

## 2023-10-19 PROCEDURE — 36415 COLL VENOUS BLD VENIPUNCTURE: CPT

## 2023-10-19 PROCEDURE — 83036 HEMOGLOBIN GLYCOSYLATED A1C: CPT

## 2023-10-19 PROCEDURE — 85007 BL SMEAR W/DIFF WBC COUNT: CPT

## 2023-10-19 PROCEDURE — 80053 COMPREHEN METABOLIC PANEL: CPT

## 2023-10-19 PROCEDURE — 80061 LIPID PANEL: CPT

## 2023-10-19 PROCEDURE — 85027 COMPLETE CBC AUTOMATED: CPT

## 2023-10-20 NOTE — PROGRESS NOTES
Progress Note - Electrophysiology-Cardiology (EP)   Rose Dixon 72 y.o. male MRN: [id number]  Unit/Bed#:  Encounter: [id number]           1. Paroxysmal A-fib (HCC)  POCT ECG      2. DIXIE not currently treated        3. Essential hypertension        4. Pulmonary hypertension ([address])        5. Ventricular fibrillation ([address])        6. Long QT syndrome type 2        7. CLL (chronic lymphocytic leukemia) ([address])        8. Hypercholesterolemia        9. Autoimmune hemolytic anemia (HCC)        10.  Alcohol use            Summary of my recommendations   EF improved to 45%  VF and post ICD - Device optimization if can have narrower QRS, QTc and RBBB morphology - min QRS is 150 ms, LBBB  Continue with dabigatran for PAF  Continue nadolol has Long QT 2  Avoid other QT prolonging meds           Clinical conditions  Cardiac arrest necessitating external resuscitation and shock  Long QT type II  Bradycardia at baseline  Intrinsic ventricular rate 50/min, progress to 2-1 heart block, progressed to complete heart block during the procedure  Post BiV ICD     Cardiomyopathy with LVEF 30% currently - EF 60% in June 2022     QT prolonging medication  Paroxysmal A-fib  Alcohol use  Obesity  Alzheimer's dementia  History of CLL  Hypertension  UTI            Assessment/Plan     Cardiac arrest necessitating external resuscitation and shock  Long QT type II  Bradycardia at baseline  Intrinsic ventricular rate 50/min, progress to 2-1 heart block, progressed to complete heart block during the procedure  Post BiV ICD    Patient currently on nadolol although this has limited effect and long QT type II  Patient should be evaluated for QT genotype as we think he is type II based on his son being positive for type II  Patient does have BiV ICD in place  Avoid all QT prolonging conditions-hypokalemia, medications, bradycardia          Cardiomyopathy with LVEF 30% post arrest  - EF 60% in June 2022 - currently improved to 45% oct 2023  Patient is on optimal therapy for heart failure  Repeat an echocardiogram in 2 to 3 months time  Medications can be titrated after that as needed by primary cardiologist           QT prolonging medication  Avoid all QT prolonging medication          Paroxysmal A-fib  Long-term anticoagulation  Currently we are going with rate control and anticoagulation strategy  Rate control nadolol  Anticoagulation-dabigatran      Alcohol use  Advised to avoid alcohol  Prior history of significant alcohol use      Obesity  BMI 30  Diet is the most important way to lose weight      Alzheimer's dementia  At baseline      History of CLL  Follow-up with primary      Hypertension  126/78  Currently on losartan, nadolol      UTI  Self catheterizes      Mixed hyperlipidemia  On atorvastatin  No myositis or myalgia            History of Present Illness   HPI: Jayce Iglesias is a 72y.o. year old male following up post VF arrest and BiV ICD placement     Slowly improving in activity and EF is up to 45%    The patient has significant medical illnesses which include  Cardiac arrest necessitating external resuscitation and shock  Long QT type II  Bradycardia at baseline  Intrinsic ventricular rate 50/min, progress to 2-1 heart block, progressed to complete heart block during the procedure  Post BiV ICD  Cardiomyopathy with LVEF 30% currently - EF 60% in June 2022  QT prolonging medication  Paroxysmal A-fib  Alcohol use  Obesity  Alzheimer's dementia  History of CLL  Hypertension  UTI    Patient does have a long history of multiple diseases  Has undergone prior diverticular surgery, TURP, self catheterizes for urinary retention    He has slowly started moving around  Not complaining of angina, worsening orthopnea or PND or leg swelling  No further complaints of presyncope or syncope  Activities are limited and has exertional intolerance      Patient does have a history of snoring, morning fatigue and daytime sleepiness      Historical Information   Past Medical History:   Diagnosis Date    Anxiety     BPH (benign prostatic hypertrophy)     Cancer (HCC)     CLL    CLL (chronic lymphocytic leukemia) ([address])     2009    Colon polyp     Concussion     Resolved: 08/22/16    Depression     Diverticulitis 01/13/2020 2014 CT done 11/19 12/19 CT done     E coli bacteremia 06/27/2021    2/2 blood cultures with Jolanta Bumps appears to be the urine     Epididymitis 05/19/2021 5/2021    Gastrointestinal hemorrhage     Last assessed: 08/27/13    GERD (gastroesophageal reflux disease)     H/O vitamin D deficiency 07/07/2021    Hearing loss of aging     Hiatal hernia     History of right hip replacement 04/19/2021    History of transfusion     Hyperlipidemia     Hypertension     Hyponatremia 03/06/2023    Iron deficiency anemia     Microscopic hematuria     Last assessed: 06/28/13    OA (osteoarthritis)     right hip    Obesity (BMI 30.0-34.9) 04/07/2022    Pneumothorax     Primary osteoarthritis of right hip 09/29/2017    He is status post right hip arthroplasty    Prostatitis     Pulmonary hypertension (HCC)     QT prolongation     Seasonal allergies     Sepsis ([address]) 03/06/2023    Unresponsive episode 03/07/2023    Urinary tract infection associated with catheterization of urinary tract  02/05/2021    Pseudomonal UTI asymptomatic. Per Urology do not treat at this time. Urinary tract infection associated with catheterization of urinary tract  02/05/2021    Pseudomonal UTI asymptomatic. Per Urology do not treat at this time. He did have urosepsis from E coli 7/21    UTI (urinary tract infection)     in past    Wears glasses      Past Surgical History:   Procedure Laterality Date    BLADDER SURGERY      CARDIAC CATHETERIZATION Left 3/7/2023    Procedure: Cardiac Left Heart Cath;  Surgeon: Bucky Harris MD;  Location: [facility];   Service: Cardiology    CARDIAC CATHETERIZATION N/A 3/7/2023    Procedure: Cardiac temporary pacemaker;  Surgeon: Bucky Harris MD; Location: [facility]; Service: Cardiology    CARDIAC ELECTROPHYSIOLOGY PROCEDURE N/A 3/8/2023    Procedure: Cardiac icd implant;  Surgeon: Torey Duval MD;  Location: [facility];   Service: Cardiology    COLONOSCOPY      CYSTOSCOPY  10/09/2014    Diagnostic    CYSTOSCOPY  2020    FL CYSTOGRAM  08/15/2022    FRACTURE SURGERY      left lower arm    FRACTURE SURGERY      left femur    HERNIA REPAIR      JOINT REPLACEMENT Right     hip    OTHER SURGICAL HISTORY  2011    Spinal anesthesia epidural    OK ARTHRP ACETBLR/PROX FEM PROSTC AGRFT/ALGRFT Right 2017    Procedure: ARTHROPLASTY HIP TOTAL ANTERIOR;  Surgeon: Chapito Tate MD;  Location: [facility];  Service: Orthopedics    TONSILLECTOMY AND ADENOIDECTOMY      TRANSURETHRAL RESECTION OF PROSTATE      x 2    WRIST SURGERY       Social History     Substance and Sexual Activity   Alcohol Use Yes    Alcohol/week: 10.0 standard drinks of alcohol    Types: 10 Cans of beer per week    Comment: socially     Social History     Substance and Sexual Activity   Drug Use Not Currently    Types: Marijuana    Comment: "medical marijuana"     Social History     Tobacco Use   Smoking Status Former    Packs/day: 1.00    Years: 15.00    Total pack years: 15.00    Types: Cigarettes    Start date:     Quit date: 1980    Years since quittin.1    Passive exposure: Past   Smokeless Tobacco Never     Social History     Socioeconomic History    Marital status: /Civil Union     Spouse name: Not on file    Number of children: Not on file    Years of education: Not on file    Highest education level: Not on file   Occupational History    Not on file   Tobacco Use    Smoking status: Former     Packs/day: 1.00     Years: 15.00     Total pack years: 15.00     Types: Cigarettes     Start date:      Quit date: 1980     Years since quittin.1     Passive exposure: Past    Smokeless tobacco: Never   Vaping Use    Vaping Use: Never used Substance and Sexual Activity    Alcohol use: Yes     Alcohol/week: 10.0 standard drinks of alcohol     Types: 10 Cans of beer per week     Comment: socially    Drug use: Not Currently     Types: Marijuana     Comment: "medical marijuana"    Sexual activity: Not Currently   Other Topics Concern    Not on file   Social History Narrative    Not on file     Social Determinants of Health     Financial Resource Strain: Low Risk  (10/23/2023)    Overall Financial Resource Strain (CARDIA)     Difficulty of Paying Living Expenses: Not hard at all   Food Insecurity: No Food Insecurity (3/9/2023)    Hunger Vital Sign     Worried About Running Out of Food in the Last Year: Never true     Ran Out of Food in the Last Year: Never true   Transportation Needs: No Transportation Needs (10/23/2023)    PRAPARE - Transportation     Lack of Transportation (Medical): No     Lack of Transportation (Non-Medical): No   Physical Activity: Not on file   Stress: Not on file   Social Connections: Not on file   Intimate Partner Violence: Not on file   Housing Stability: Low Risk  (3/9/2023)    Housing Stability Vital Sign     Unable to Pay for Housing in the Last Year: No     Number of Places Lived in the Last Year: 1     Unstable Housing in the Last Year: No     .  Family History:  Family History   Problem Relation Age of Onset    No Known Problems Mother     Heart attack Father     Diabetes Brother     Prostate cancer Brother          Meds/Allergies      No current facility-administered medications for this visit.         (Not in a hospital admission)      Allergies   Allergen Reactions    Ciprofloxacin Other (See Comments)     LONG QT syndrome    Codeine Other (See Comments)     agitated    Sulfamethoxazole-Trimethoprim GI Intolerance, Abdominal Pain and Other (See Comments)     upset stomach           Objective   Vitals:   Visit Vitals  /74 (BP Location: Right arm, Patient Position: Sitting, Cuff Size: Standard)   Pulse 71   Ht 5' 8" (1.727 m)   Wt 91.1 kg (200 lb 12.8 oz)   BMI 30.53 kg/m²   Smoking Status Former   BSA 2.05 m²        Invasive Devices       None                     ROS  Review of Systems   All other systems reviewed and are negative. As described in my history of present illness        PHYSICAL EXAM  Physical Exam  Constitutional:       General: He is not in acute distress. Appearance: Normal appearance. He is obese. He is not ill-appearing. HENT:      Head: Normocephalic and atraumatic. Right Ear: External ear normal.      Left Ear: External ear normal.      Nose: Nose normal.      Mouth/Throat:      Comments: Posterior pharynx is crowded  Eyes:      General: No scleral icterus. Extraocular Movements: Extraocular movements intact. Conjunctiva/sclera: Conjunctivae normal.      Pupils: Pupils are equal, round, and reactive to light. Neck:      Comments: Short thick neck  Cardiovascular:      Rate and Rhythm: Normal rate and regular rhythm. Heart sounds: Normal heart sounds. No murmur heard. Comments: paced  Pulmonary:      Effort: No respiratory distress. Breath sounds: Examination of the right-middle field reveals decreased breath sounds. Examination of the left-middle field reveals decreased breath sounds. Examination of the right-lower field reveals decreased breath sounds. Examination of the left-lower field reveals decreased breath sounds. Decreased breath sounds present. Abdominal:      Palpations: Abdomen is soft. Comments: Central obesity present   Musculoskeletal:         General: No swelling or deformity. Skin:     Coloration: Skin is not jaundiced. Findings: No bruising or lesion. Neurological:      Mental Status: He is alert and oriented to person, place, and time. Mental status is at baseline. Psychiatric:         Mood and Affect: Mood normal.         Behavior: Behavior normal.         Thought Content:  Thought content normal.         Judgment: Judgment normal. LAB RESULTS:      CBC:  Results from Last 12 Months   Lab Units 10/19/23  0921 07/26/23  0724 07/02/23  0520 07/01/23  0541 06/30/23  1322 04/18/23  0840 03/28/23  1214 03/11/23  0635 03/09/23  0516 03/08/23  0427 03/07/23  2326   WBC Thousand/uL 48.15* 39.13* 12.91* 18.72* 33.45* 28.93*   < > 22.48*   < > 15.53* 15.23*   HEMOGLOBIN g/dL 12.6 12.5 9.5* 9.3* 11.8* 12.3   < > 8.7*   < > 9.3* 9.1*   HEMATOCRIT % 39.8 37.6 29.7* 29.1* 34.7* 37.2   < > 27.0*   < > 27.9* 27.4*   MCV fL 101* 100* 105* 102* 98 104*   < > 110*   < > 107* 107*   PLATELETS Thousands/uL 226 183 106* 111* 177 206   < > 130*   < > 116* 123*   RBC Million/uL 3.93 3.78* 2.82* 2.85* 3.53* 3.59*   < > 2.46*   < > 2.62* 2.57*   MCH pg 32.1 33.1 33.7 32.6 33.4 34.3   < > 35.4*   < > 35.5* 35.4*   MCHC g/dL 31.7 33.2 32.0 32.0 34.0 33.1   < > 32.2   < > 33.3 33.2   RDW % 14.6 14.5 14.6 14.5 14.3 13.6   < > 14.6   < > 14.0 14.1   MPV fL 10.7 11.1 10.3 10.1 9.6 10.7   < > 10.2   < > 9.7 10.1   NRBC AUTO /100 WBCs  --   --  0  --   --  0  --  0  --  0 0    < > = values in this interval not displayed.       CMP:  Results from Last 12 Months   Lab Units 10/19/23  0921 10/09/23  1035 10/05/23  0743 07/26/23  0724 07/02/23  0520 07/01/23  0541 06/30/23  1322 04/18/23  0840   POTASSIUM mmol/L 5.1 4.5 4.5 4.9 4.0 3.8 5.0 4.9   CHLORIDE mmol/L 101 100 98 100 103 101 100 101   CO2 mmol/L 28 29 29 29 27 25 25 27   BUN mg/dL 15 13 10 15 7 10 15 12   CREATININE mg/dL 0.91 0.83 0.80 0.66 0.70 0.73 0.84 0.82   CALCIUM mg/dL 9.4 9.3 9.4 9.4 8.2* 8.1* 9.2 9.0   AST U/L 21  --   --  37  --  17 27 32   ALT U/L 24  --   --  49  --  24 33 53   ALK PHOS U/L 90  --   --  92  --  66 89 93   EGFR ml/min/1.73sq m 84 88 89 97 95 93 88 89      Magnesium:   Results from Last 12 Months   Lab Units 10/09/23  1035 10/05/23  0743 04/18/23  0840 03/10/23  0541 03/09/23  0516   MAGNESIUM mg/dL 2.0 2.2 2.3 2.4 2.4     A1C:  Results from Last 12 Months   Lab Units 10/19/23  4000 23  0605   HEMOGLOBIN A1C % 5.8* 5.4      TSH:  Component Ref Range & Units 23  8:40 AM 3/6/23  1:57 PM 10/11/22  7:45 AM 22  8:01 AM 10/6/21  8:39 AM 21  8:33 AM 1/15/21  3:53 PM   TSH 3RD GENERATON 0.450 - 4.500 uIU/mL 1.450 1.754 CM 1.080 CM 1.200 R 0.984 R 0.961 R 0.325 Low  R, CM          PT/INR:  Results from Last 12 Months   Lab Units 23  1357   PROTIME seconds 15.6*   INR  1.25*     Lipid Panel:  Results from Last 12 Months   Lab Units 10/19/23  0921 23  0840 23  0605   CHOLESTEROL mg/dL 123 118 107   TRIGLYCERIDES mg/dL 120 118 87   HDL mg/dL 29* 31* 40   NON-HDL-CHOL (CHOL-HDL) mg/dl  --   --  67        Imaging:      EKG  2023          Echocardiogram:    Echo Follow Up/Limited  2023    Interpretation Summary      Left Ventricle: Left ventricular cavity size is normal. Wall thickness is normal. The left ventricular ejection fraction is 45%. Systolic function is mildly reduced. There is mild global hypokinesis with akinesis of the basal inferior myocardial wall segment. Left Atrium: The atrium is mildly dilated. Right Atrium: The atrium is mildly dilated. Aortic Valve: There is mild regurgitation. Mitral Valve: There is mild annular calcification. Tricuspid Valve: There is mild regurgitation. Echo complete with contrast if indicated    Results for orders placed during the hospital encounter of 01/10/21    [address], [address] [facility]  [phone]    Transthoracic Echocardiogram  2D, M-mode, Doppler, and Color Doppler    Study date:  15-Som-2021    Patient: Alonza Councilman  MR number: [id number]  Account number: [de-identified]  : 1950  Age: 78 years  Gender: Male  Status: Inpatient  Location: ICU  Height: 66 in  Weight: 195 lb  BP: 149/ 71 mmHg    Indications: New V-Tach, COVID-19.     Diagnoses: I47.2 - Ventricular tachycardia    Sonographer:  HARJINDER Amado  Primary Physician:  Radha Dickson Tammy Humphreys MD  Referring Physician:  Sean Albarran MD  Group:  [facility]  Interpreting Physician:  Mame Ritchie DO    SUMMARY    SUMMARY:  Limited study. LEFT VENTRICLE:  Systolic function was normal. Ejection fraction was estimated to be 60 %. Although no diagnostic regional wall motion abnormality was identified, this possibility cannot be completely excluded on the basis of this study. RIGHT VENTRICLE:  The ventricle was mildly to moderately dilated. Systolic function was mildly reduced. Free wall hypokinesis. LEFT ATRIUM:  The atrium was dilated. MITRAL VALVE:  There was mild regurgitation. AORTIC VALVE:  There was mild to moderate regurgitation. TRICUSPID VALVE:  There was moderate regurgitation. Estimated peak PA pressure was 75 mmHg. IVC, HEPATIC VEINS:  The inferior vena cava was dilated, with poor inspiratory collapse, consistent with elevated right atrial pressure. HISTORY: PRIOR HISTORY: COVID-19 +, hypertension, PHTN, Luekemia, Alcohol Abuse, Former Smoker. PROCEDURE: The study was performed in the ICU. This was a routine study. The transthoracic approach was used. The study included complete 2D imaging, M-mode, complete spectral Doppler, and color Doppler. The heart rate was 47 bpm, at the  start of the study. Images were obtained from the parasternal, apical, subcostal, and suprasternal notch acoustic windows. Image quality was adequate. LEFT VENTRICLE: Size was normal. Systolic function was normal. Ejection fraction was estimated to be 60 %. Although no diagnostic regional wall motion abnormality was identified, this possibility cannot be completely excluded on the basis  of this study. RIGHT VENTRICLE: The ventricle was mildly to moderately dilated. Systolic function was mildly reduced. Free wall hypokinesis. Wall thickness was normal.    LEFT ATRIUM: The atrium was dilated.     RIGHT ATRIUM: Size was normal.    MITRAL VALVE: Valve structure was normal. There was normal leaflet separation. DOPPLER: The transmitral velocity was within the normal range. There was no evidence for stenosis. There was mild regurgitation. AORTIC VALVE: The valve was trileaflet. Leaflets exhibited normal thickness and normal cuspal separation. DOPPLER: Transaortic velocity was within the normal range. There was no evidence for stenosis. There was mild to moderate  regurgitation. TRICUSPID VALVE: The valve structure was normal. There was normal leaflet separation. DOPPLER: The transtricuspid velocity was within the normal range. There was no evidence for stenosis. There was moderate regurgitation. Estimated peak PA  pressure was 75 mmHg. PULMONIC VALVE: Not well visualized. PERICARDIUM: There was no pericardial effusion. The pericardium was normal in appearance. AORTA: The root exhibited normal size. SYSTEMIC VEINS: IVC: The inferior vena cava was dilated, with poor inspiratory collapse, consistent with elevated right atrial pressure. SYSTEM MEASUREMENT TABLES    2D  %FS: 33.33 %  Ao Diam: 3.62 cm  EDV(Teich): 99.88 ml  EF(Teich): 62.02 %  ESV(Teich): 37.93 ml  IVSd: 1.15 cm  LA Area: 26.46 cm2  LA Diam: 4.59 cm  LVEDV MOD A4C: 166.8 ml  LVEF MOD A4C: 60.98 %  LVESV MOD A4C: 65.09 ml  LVIDd: 4.65 cm  LVIDs: 3.1 cm  LVLd A4C: 9.42 cm  LVLs A4C: 7.89 cm  LVPWd: 1.03 cm  RA Area: 16.54 cm2  RVIDd: 5.01 cm  SV MOD A4C: 101.71 ml  SV(Teich): 61.94 ml    CW  AR Dec Bladen: 3.09 m/s2  AR Dec Time: 1445.59 ms  AR PHT: 419.22 ms  AR Vmax: 4.47 m/s  AR maxP.75 mmHg  AV Env. Ti: 304.47 ms  AV MaxP.7 mmHg  AV VTI: 33.72 cm  AV Vmax: 1.78 m/s  AV Vmean: 1.11 m/s  AV meanP.58 mmHg  MR VTI: 204.77 cm  MR Vmax: 4.56 m/s  MR Vmean: 3.84 m/s  MR maxP.27 mmHg  MR meanP.52 mmHg  TR MaxP.73 mmHg  TR Vmax: 3.96 m/s    PW  LVOT Env. Ti: 374.88 ms  LVOT VTI: 24.53 cm  LVOT Vmax: 1.04 m/s  LVOT Vmean: 0.65 m/s  LVOT maxP.42 mmHg  LVOT meanPG: 2.05 mmHg  MV A Adithya: 0.16 m/s  MV Dec Geary: 2.45 m/s2  MV DecT: 254.72 ms  MV E Adithya: 0.62 m/s  MV E/A Ratio: 3.99  MV PHT: 73.87 ms  MVA By PHT: 2.98 cm2    [facility] Accredited Echocardiography Laboratory    Prepared and electronically signed by    Dimitrios VogelonDO  Signed 15-Som-2021 12:34:36      Stress Test:   No results found for this or any previous visit. Cardiac Catheterization:    Cardiac Left Heart Cath, Cardiac Temporary Pacemaker  03/07/2023    Coronary Findings    Diagnostic  Dominance: Right    Left Main   The vessel was visualized by angiography, is moderate in size and is angiographically normal.      Left Anterior Descending   The vessel is moderate in size. Prox LAD to Mid LAD lesion is 40% stenosed. YAMILETH flow is 3. Left Circumflex   The vessel is large and is angiographically normal.      Right Coronary Artery   The vessel is moderate in size, is angiographically normal and dominant. DEVICE CHECK:    Results for orders placed or performed in visit on 09/14/23   Cardiac EP device report    Narrative    MDT BI-V ICD (DDIR) ACTIVE SYSTEM IS MRI CONDITIONAL  CARELINK TRANSMISSION: BATTERY STATUS "6 YRS." AP 99% CRT PACINIG (BIV) 100% (LV) 0%. ALL AVAILABLE LEAD PARAMETERS WITHIN NORMAL LIMITS. NO SIGNIFICANT HIGH RATE EPISODES. VENT SENSING MARKERS, PRESENT AS FUSION & PVCS. PT ON NADOLOL & PRADAXA. EF 45% (ECHO 2023). OPTI-VOL WITHIN NORMAL LIMITS. NORMAL DEVICE FUNCTION.  NC            Code Status: [unfilled]  Advance Directive and Living Will: Yes    Power of :    POLST:      Counseling / Coordination of Care  Detailed discussion done with regards to following QT      Adela Fine MD

## 2023-10-23 ENCOUNTER — OFFICE VISIT (OUTPATIENT)
Dept: FAMILY MEDICINE CLINIC | Facility: CLINIC | Age: 73
End: 2023-10-23
Payer: MEDICARE

## 2023-10-23 VITALS
OXYGEN SATURATION: 99 % | HEART RATE: 72 BPM | TEMPERATURE: 99.2 F | DIASTOLIC BLOOD PRESSURE: 74 MMHG | BODY MASS INDEX: 30.31 KG/M2 | WEIGHT: 200 LBS | RESPIRATION RATE: 18 BRPM | SYSTOLIC BLOOD PRESSURE: 130 MMHG | HEIGHT: 68 IN

## 2023-10-23 DIAGNOSIS — M48.061 SPINAL STENOSIS OF LUMBAR REGION WITHOUT NEUROGENIC CLAUDICATION: Chronic | ICD-10-CM

## 2023-10-23 DIAGNOSIS — I10 ESSENTIAL HYPERTENSION: Chronic | ICD-10-CM

## 2023-10-23 DIAGNOSIS — C91.10 CLL (CHRONIC LYMPHOCYTIC LEUKEMIA) (HCC): Chronic | ICD-10-CM

## 2023-10-23 DIAGNOSIS — L98.9 SKIN LESION OF CHEST WALL: ICD-10-CM

## 2023-10-23 DIAGNOSIS — I48.0 PAROXYSMAL A-FIB (HCC): ICD-10-CM

## 2023-10-23 DIAGNOSIS — I27.20 PULMONARY HYPERTENSION (HCC): Chronic | ICD-10-CM

## 2023-10-23 DIAGNOSIS — I49.01 VENTRICULAR FIBRILLATION (HCC): ICD-10-CM

## 2023-10-23 DIAGNOSIS — F33.9 DEPRESSION, RECURRENT (HCC): ICD-10-CM

## 2023-10-23 DIAGNOSIS — D50.9 IRON DEFICIENCY ANEMIA, UNSPECIFIED IRON DEFICIENCY ANEMIA TYPE: Chronic | ICD-10-CM

## 2023-10-23 DIAGNOSIS — I45.81 LONG QT SYNDROME TYPE 2: ICD-10-CM

## 2023-10-23 DIAGNOSIS — K31.819 GAVE (GASTRIC ANTRAL VASCULAR ECTASIA): ICD-10-CM

## 2023-10-23 DIAGNOSIS — K21.9 GASTROESOPHAGEAL REFLUX DISEASE WITHOUT ESOPHAGITIS: Primary | ICD-10-CM

## 2023-10-23 DIAGNOSIS — I50.42 CHRONIC COMBINED SYSTOLIC AND DIASTOLIC CONGESTIVE HEART FAILURE (HCC): ICD-10-CM

## 2023-10-23 DIAGNOSIS — R56.9 SEIZURE-LIKE ACTIVITY (HCC): ICD-10-CM

## 2023-10-23 DIAGNOSIS — G47.33 OSA (OBSTRUCTIVE SLEEP APNEA): Chronic | ICD-10-CM

## 2023-10-23 DIAGNOSIS — D59.10 AUTOIMMUNE HEMOLYTIC ANEMIA (HCC): ICD-10-CM

## 2023-10-23 DIAGNOSIS — R73.9 HYPERGLYCEMIA: ICD-10-CM

## 2023-10-23 PROCEDURE — G0439 PPPS, SUBSEQ VISIT: HCPCS | Performed by: FAMILY MEDICINE

## 2023-10-23 PROCEDURE — 99214 OFFICE O/P EST MOD 30 MIN: CPT | Performed by: FAMILY MEDICINE

## 2023-10-23 NOTE — PROGRESS NOTES
Assessment and Plan:     Problem List Items Addressed This Visit        Digestive    Gastroesophageal reflux disease without esophagitis - Primary    GAVE (gastric antral vascular ectasia)       Respiratory    DIXIE not currently treated (Chronic)       Cardiovascular and Mediastinum    Essential hypertension (Chronic)    Relevant Orders    Lipid Panel with Direct LDL reflex    TSH, 3rd generation with Free T4 reflex    Pulmonary hypertension (HCC) (Chronic)    Relevant Orders    Comprehensive metabolic panel    Ventricular fibrillation (HCC)    Paroxysmal A-fib (HCC)    Relevant Orders    Comprehensive metabolic panel    TSH, 3rd generation with Free T4 reflex    Long QT syndrome type 2    Chronic combined systolic and diastolic congestive heart failure (HCC)    Relevant Orders    Comprehensive metabolic panel       Musculoskeletal and Integument    Skin lesion of chest wall    Relevant Orders    Ambulatory referral to Dermatology       Other    CLL (chronic lymphocytic leukemia) (HCC) (Chronic)    Relevant Orders    Comprehensive metabolic panel    Spinal stenosis of lumbar region (Chronic)    Iron deficiency anemia (Chronic)    Depression, recurrent (HCC)    Relevant Orders    Comprehensive metabolic panel    TSH, 3rd generation with Free T4 reflex    Autoimmune hemolytic anemia (HCC)    Seizure-like activity (HCC)   Other Visit Diagnoses     Hyperglycemia        Relevant Orders    Comprehensive metabolic panel    Hemoglobin A1C    Lipid Panel with Direct LDL reflex           Preventive health issues were discussed with patient, and age appropriate screening tests were ordered as noted in patient's After Visit Summary. Personalized health advice and appropriate referrals for health education or preventive services given if needed, as noted in patient's After Visit Summary.      History of Present Illness:     Patient presents for a Medicare Wellness Visit    Patient presents today for follow-up of chronic health issues as well as wellness visit. He recently had his COVID-vaccine as well as RSV and flu. He has no acute complaints today. He does continue to have some issues with sleep and requests an Ambien prescription for 90 days to help save some money. He has really cut back on alcohol these days and never takes Ambien or lorazepam if he has an alcoholic beverage. He does admit to stress regarding his ongoing medical issues and feels buspirone is somewhat helpful for those. He will be seeing hematology this week. He denies night sweats or unintentional weight loss. He has been exercising pretty routinely without cardiovascular imitations. He denies chest pain. Does get short of breath with significant exertion. He notes a nonhealing lesion on his left chest wall that has been present for at least 2 months. Patient Care Team:  Dashawn Jenkins MD as PCP - MD Adrianna Barger MD     Review of Systems:     Review of Systems   Constitutional:  Negative for appetite change, chills, fatigue, fever and unexpected weight change. HENT:  Negative for trouble swallowing. Eyes:  Negative for visual disturbance. Respiratory:  Negative for cough, chest tightness, shortness of breath and wheezing. Cardiovascular:  Negative for chest pain, palpitations and leg swelling. Gastrointestinal:  Negative for abdominal distention, abdominal pain, blood in stool, constipation and diarrhea. Endocrine: Negative for polyuria. Genitourinary:  Negative for difficulty urinating and flank pain. Musculoskeletal:  Negative for arthralgias and myalgias. Skin:  Negative for rash. Neurological:  Negative for dizziness and light-headedness. Hematological:  Negative for adenopathy. Does not bruise/bleed easily. Psychiatric/Behavioral:  Positive for sleep disturbance. Negative for dysphoric mood. The patient is nervous/anxious.          Problem List:     Patient Active Problem List Diagnosis   • Essential hypertension   • CLL (chronic lymphocytic leukemia) (HCC)   • Allergic rhinitis due to pollen   • Erectile dysfunction of non-organic origin   • Gastroesophageal reflux disease without esophagitis   • Pulmonary hypertension (HCC)   • Spinal stenosis of lumbar region   • Iron deficiency anemia   • DDD (degenerative disc disease), cervical   • DIXIE not currently treated   • Hypercholesterolemia   • Depression, recurrent (HCC)   • BPH with obstruction/lower urinary tract symptoms   • GAVE (gastric antral vascular ectasia)   • Autoimmune hemolytic anemia (HCC)   • Encysted hydrocele   • Diverticulum of bladder   • Seizure-like activity (HCC)   • Alcohol use   • Ventricular fibrillation (HCC)   • Narrow complex tachycardia   • Paroxysmal A-fib (HCC)   • Long QT syndrome type 2   • Chronic combined systolic and diastolic congestive heart failure (HCC)   • Polyp of colon   • Skin lesion of chest wall      Past Medical and Surgical History:     Past Medical History:   Diagnosis Date   • Anxiety    • BPH (benign prostatic hypertrophy)    • Cancer (HCC)     CLL   • CLL (chronic lymphocytic leukemia) ([address])     2009   • Colon polyp    • Concussion     Resolved: 08/22/16   • Depression    • Diverticulitis 01/13/2020 2014 CT done 11/19 12/19 CT done    • E coli bacteremia 06/27/2021 2/2 blood cultures with [facility]  Source appears to be the urine    • Epididymitis 05/19/2021 5/2021   • Gastrointestinal hemorrhage     Last assessed: 08/27/13   • GERD (gastroesophageal reflux disease)    • H/O vitamin D deficiency 07/07/2021   • Hearing loss of aging    • Hiatal hernia    • History of right hip replacement 04/19/2021   • History of transfusion    • Hyperlipidemia    • Hypertension    • Hyponatremia 03/06/2023   • Iron deficiency anemia    • Microscopic hematuria     Last assessed: 06/28/13   • OA (osteoarthritis)     right hip   • Obesity (BMI 30.0-34.9) 04/07/2022   • Pneumothorax    • Primary osteoarthritis of right hip 09/29/2017    He is status post right hip arthroplasty   • Prostatitis    • Pulmonary hypertension (HCC)    • QT prolongation    • Seasonal allergies    • Sepsis ([address]) 03/06/2023   • Unresponsive episode 03/07/2023   • Urinary tract infection associated with catheterization of urinary tract  02/05/2021    Pseudomonal UTI asymptomatic. Per Urology do not treat at this time. • Urinary tract infection associated with catheterization of urinary tract  02/05/2021    Pseudomonal UTI asymptomatic. Per Urology do not treat at this time. He did have urosepsis from E coli 7/21   • UTI (urinary tract infection)     in past   • Wears glasses      Past Surgical History:   Procedure Laterality Date   • BLADDER SURGERY     • CARDIAC CATHETERIZATION Left 3/7/2023    Procedure: Cardiac Left Heart Cath;  Surgeon: Krystal Sousa MD;  Location: [facility]; Service: Cardiology   • CARDIAC CATHETERIZATION N/A 3/7/2023    Procedure: Cardiac temporary pacemaker;  Surgeon: Krystal Sousa MD;  Location: [facility]; Service: Cardiology   • CARDIAC ELECTROPHYSIOLOGY PROCEDURE N/A 3/8/2023    Procedure: Cardiac icd implant;  Surgeon: Sophie Schultz MD;  Location: [facility];   Service: Cardiology   • COLONOSCOPY     • CYSTOSCOPY  10/09/2014    Diagnostic   • CYSTOSCOPY  06/29/2020   • FL CYSTOGRAM  08/15/2022   • FRACTURE SURGERY      left lower arm   • FRACTURE SURGERY      left femur   • HERNIA REPAIR     • JOINT REPLACEMENT Right     hip   • OTHER SURGICAL HISTORY  03/2011    Spinal anesthesia epidural   • NE ARTHRP ACETBLR/PROX FEM PROSTC AGRFT/ALGRFT Right 09/29/2017    Procedure: ARTHROPLASTY HIP TOTAL ANTERIOR;  Surgeon: Diandra Burnham MD;  Location: [facility];  Service: Orthopedics   • TONSILLECTOMY AND ADENOIDECTOMY     • TRANSURETHRAL RESECTION OF PROSTATE      x 2   • WRIST SURGERY        Family History:     Family History   Problem Relation Age of Onset   • No Known Problems Mother    • Heart attack Father    • Diabetes Brother    • Prostate cancer Brother       Social History:     Social History     Socioeconomic History   • Marital status: /Civil Union     Spouse name: None   • Number of children: None   • Years of education: None   • Highest education level: None   Occupational History   • None   Tobacco Use   • Smoking status: Former     Packs/day: 1.00     Years: 15.00     Total pack years: 15.00     Types: Cigarettes     Start date: 67     Quit date: 1980     Years since quittin.1   • Smokeless tobacco: Never   Vaping Use   • Vaping Use: Never used   Substance and Sexual Activity   • Alcohol use: Yes     Alcohol/week: 10.0 standard drinks of alcohol     Types: 10 Cans of beer per week     Comment: socially   • Drug use: Not Currently     Types: Marijuana     Comment: "medical marijuana"   • Sexual activity: Not Currently   Other Topics Concern   • None   Social History Narrative   • None     Social Determinants of Health     Financial Resource Strain: Low Risk  (10/23/2023)    Overall Financial Resource Strain (CARDIA)    • Difficulty of Paying Living Expenses: Not hard at all   Food Insecurity: No Food Insecurity (3/9/2023)    Hunger Vital Sign    • Worried About Running Out of Food in the Last Year: Never true    • Ran Out of Food in the Last Year: Never true   Transportation Needs: No Transportation Needs (10/23/2023)    PRAPARE - Transportation    • Lack of Transportation (Medical): No    • Lack of Transportation (Non-Medical):  No   Physical Activity: Not on file   Stress: Not on file   Social Connections: Not on file   Intimate Partner Violence: Not on file   Housing Stability: Low Risk  (3/9/2023)    Housing Stability Vital Sign    • Unable to Pay for Housing in the Last Year: No    • Number of Places Lived in the Last Year: 1    • Unstable Housing in the Last Year: No      Medications and Allergies:     Current Outpatient Medications   Medication Sig Dispense Refill   • Acetaminophen 500 MG Take 1,000 mg by mouth prn     • ascorbic acid (VITAMIN C) 250 MG CHEW Chew 250 mg daily     • atorvastatin (LIPITOR) 20 mg tablet TAKE ONE TABLET BY MOUTH ONCE EVERY DAY 90 tablet 3   • Blood Pressure Monitoring (Omron 5 Series BP Monitor) PEE      • busPIRone (BUSPAR) 7.5 mg tablet 1 tablet in the morning and 1 at night 180 tablet 3   • dabigatran etexilate (PRADAXA) 150 mg capsu Take 1 capsule (150 mg total) by mouth 2 (two) times a day 180 capsule 1   • Lactobacillus (PROBIOTIC ACIDOPHILUS PO) Take by mouth     • LORazepam (Ativan) 0.5 mg tablet Take 1 tablet (0.5 mg total) by mouth every 8 (eight) hours as needed for anxiety 60 tablet 1   • losartan (COZAAR) 50 mg tablet Take 1 tablet (50 mg total) by mouth daily 90 tablet 1   • montelukast (SINGULAIR) 10 mg tablet TAKE ONE TABLET BY MOUTH DAILY 90 tablet 3   • nadolol (CORGARD) 80 MG tablet Take 1 tablet (80 mg total) by mouth daily in the early morning 90 tablet 3   • Omega-3 Fatty Acids (FISH OIL) 1,000 mg Take by mouth daily     • omeprazole (PriLOSEC) 40 MG capsule Take one capsule by mouth once every day. 90 capsule 3   • spironolactone (ALDACTONE) 25 mg tablet Take 1 tablet (25 mg total) by mouth daily 90 tablet 1   • zolpidem (AMBIEN) 5 mg tablet Take 1/2-1 full tablet at bedtime as needed for sleep. Do not take if drinking any alcohol or taking lorazepam 30 tablet 0   • docusate sodium (COLACE) 100 mg capsule Take 1 capsule (100 mg total) by mouth 2 (two) times a day 60 capsule 0   • vitamin B-12 (CYANOCOBALAMIN) 500 MCG TABS Take 1 tablet (500 mcg total) by mouth daily 30 tablet 0     No current facility-administered medications for this visit.      Allergies   Allergen Reactions   • Ciprofloxacin Other (See Comments)     LONG QT syndrome   • Codeine Other (See Comments)     agitated   • Sulfamethoxazole-Trimethoprim GI Intolerance, Abdominal Pain and Other (See Comments)     upset stomach      Immunizations: Immunization History   Administered Date(s) Administered   • COVID-19 MODERNA VACC 0.25 ML IM BOOSTER 11/16/2021   • COVID-19 MODERNA VACC 0.5 ML IM 04/06/2021, 05/07/2021   • INFLUENZA 09/21/2015, 10/14/2016, 10/17/2022   • Influenza Quadrivalent Preservative Free 3 years and older IM 10/14/2016, 09/15/2017   • Influenza Split Preservative Free ID 09/10/2013   • Influenza, high dose seasonal 0.7 mL 10/01/2018, 10/15/2019, 09/18/2020, 09/29/2021, 10/17/2022   • Influenza, seasonal, injectable 08/31/2012, 09/23/2014   • Pneumococcal Conjugate 13-Valent 10/14/2016   • Pneumococcal Polysaccharide PPV23 09/10/2013, 08/30/2019   • Tdap 08/22/2016   • Zoster 04/29/2015, 08/20/2018   • Zoster Vaccine Recombinant 08/20/2018, 10/19/2018      Health Maintenance:         Topic Date Due   • Colorectal Cancer Screening  10/04/2026   • Hepatitis C Screening  Completed         Topic Date Due   • Hepatitis A Vaccine (1 of 2 - Risk 2-dose series) Never done   • Hepatitis B Vaccine (1 of 3 - Risk 3-dose series) Never done   • COVID-19 Vaccine (3 - Moderna risk series) 12/14/2021   • Influenza Vaccine (1) 09/01/2023      Medicare Screening Tests and Risk Assessments:     Sheila Sanchez is here for his Subsequent Wellness visit. Health Risk Assessment:   Patient rates overall health as good. Patient feels that their physical health rating is slightly better. Patient is satisfied with their life. Eyesight was rated as same. Hearing was rated as same. Patient feels that their emotional and mental health rating is same. Patient states they are sometimes unusually tired/fatigued. Pain experienced in the last 7 days has been some. Patient's pain rating has been 1/10. Patient states that he has experienced no weight loss or gain in last 6 months. Depression Screening:   PHQ-9 Score: 0      Fall Risk Screening:    In the past year, patient has experienced: no history of falling in past year      Home Safety:  Patient does not have trouble with stairs inside or outside of their home. Patient has working smoke alarms and has no working carbon monoxide detector. Home safety hazards include: none. Nutrition:   Current diet is Regular and No Added Salt. Medications:   Patient is currently taking over-the-counter supplements. OTC medications include: see medication list. Patient is able to manage medications. Activities of Daily Living (ADLs)/Instrumental Activities of Daily Living (IADLs):   Walk and transfer into and out of bed and chair?: Yes  Dress and groom yourself?: Yes    Bathe or shower yourself?: Yes    Feed yourself? Yes  Do your laundry/housekeeping?: Yes  Manage your money, pay your bills and track your expenses?: Yes  Make your own meals?: Yes    Do your own shopping?: Yes    Previous Hospitalizations:   Any hospitalizations or ED visits within the last 12 months?: Yes    How many hospitalizations have you had in the last year?: 1-2    Advance Care Planning:   Living will: Yes    Durable POA for healthcare:  Yes    Advanced directive: Yes    End of Life Decisions reviewed with patient: Yes      Cognitive Screening:   Provider or family/friend/caregiver concerned regarding cognition?: No    PREVENTIVE SCREENINGS      Cardiovascular Screening:    General: Screening Not Indicated and History Lipid Disorder      Diabetes Screening:     General: Screening Current      Colorectal Cancer Screening:     General: Screening Current      Prostate Cancer Screening:    General: Screening Current      Osteoporosis Screening:    General: Screening Current      Abdominal Aortic Aneurysm (AAA) Screening:    Risk factors include: age between 70-75 yo and tobacco use        Lung Cancer Screening:     General: Screening Not Indicated      Hepatitis C Screening:    General: Screening Current    Screening, Brief Intervention, and Referral to Treatment (SBIRT)    Screening  Typical number of drinks in a day: 1  Typical number of drinks in a week: 6  Interpretation: Low risk drinking behavior. AUDIT-C Screenin) How often did you have a drink containing alcohol in the past year? 2 to 3 times a week  2) How many drinks did you have on a typical day when you were drinking in the past year? 3 to 4  3) How often did you have 6 or more drinks on one occasion in the past year? less than monthly    AUDIT-C Score: 4  Interpretation: Score 4-12 (male): POSITIVE screen for alcohol misuse    AUDIT Screenin) How often during the last year have you found that you were not able to stop drinking once you had started? 0 - never  5) How often during the last year have you failed to do what was normally expected from you because of drinking? 0 - never  6) How often during the last year have you needed a first drink in the morning to get yourself going after a heavy drinking session? 0 - never  7) How often during the last year have you had a feeling of guilt or remorse after drinking? 0 - never  8) How often during the last year have you been unable to remember what happened the night before because you had been drinking? 0 - never  9) Have you or someone else been injured as a result of your drinking? 0 - no  10) Has a relative or friend or a doctor or another health worker been concerned about your drinking or suggested you cut down? 0 - no    AUDIT Score: 4  Interpretation: Low risk alcohol consumption    Single Item Drug Screening:  How often have you used an illegal drug (including marijuana) or a prescription medication for non-medical reasons in the past year? never    Single Item Drug Screen Score: 0  Interpretation: Negative screen for possible drug use disorder    Brief Intervention  Alcohol & drug use screenings were reviewed. No concerns regarding substance use disorder identified. Other Counseling Topics:   Regular weightbearing exercise. No results found.      Physical Exam:     /74 (BP Location: Left arm, Patient Position: Sitting, Cuff Size: Standard)   Pulse 72   Temp 99.2 °F (37.3 °C) (Tympanic)   Resp 18   Ht 5' 8" (1.727 m)   Wt 90.7 kg (200 lb)   SpO2 99%   BMI 30.41 kg/m²     Physical Exam  Constitutional:       General: He is not in acute distress. Appearance: Normal appearance. He is well-developed. He is obese. He is not diaphoretic. HENT:      Head: Normocephalic. Right Ear: External ear normal.      Left Ear: External ear normal.      Nose: Nose normal.   Eyes:      General:         Right eye: No discharge. Left eye: No discharge. Conjunctiva/sclera: Conjunctivae normal.      Pupils: Pupils are equal, round, and reactive to light. Neck:      Thyroid: No thyromegaly. Trachea: No tracheal deviation. Cardiovascular:      Rate and Rhythm: Normal rate and regular rhythm. Heart sounds: Normal heart sounds. No murmur heard. No friction rub. Pulmonary:      Effort: Pulmonary effort is normal. No respiratory distress. Breath sounds: Normal breath sounds. No wheezing. Chest:      Chest wall: No tenderness. Abdominal:      General: There is no distension. Palpations: There is no mass. Tenderness: There is no abdominal tenderness. There is no guarding or rebound. Hernia: No hernia is present. Musculoskeletal:         General: No swelling or deformity. Cervical back: Normal range of motion. Right lower leg: No edema. Left lower leg: No edema. Skin:     Findings: Lesion (See photo) present. No erythema or rash. Comments: 1x1 cm raised erythematous lesion left anterior chest wall   Neurological:      General: No focal deficit present. Mental Status: He is alert. Cranial Nerves: No cranial nerve deficit. Coordination: Coordination normal.   Psychiatric:         Thought Content:  Thought content normal.            Lawrence Morris MD

## 2023-10-25 ENCOUNTER — OFFICE VISIT (OUTPATIENT)
Dept: CARDIOLOGY CLINIC | Facility: CLINIC | Age: 73
End: 2023-10-25
Payer: MEDICARE

## 2023-10-25 ENCOUNTER — OFFICE VISIT (OUTPATIENT)
Dept: HEMATOLOGY ONCOLOGY | Facility: CLINIC | Age: 73
End: 2023-10-25
Payer: MEDICARE

## 2023-10-25 VITALS
BODY MASS INDEX: 30.43 KG/M2 | WEIGHT: 200.8 LBS | HEART RATE: 71 BPM | DIASTOLIC BLOOD PRESSURE: 74 MMHG | SYSTOLIC BLOOD PRESSURE: 130 MMHG | HEIGHT: 68 IN

## 2023-10-25 VITALS
OXYGEN SATURATION: 98 % | HEART RATE: 83 BPM | SYSTOLIC BLOOD PRESSURE: 130 MMHG | DIASTOLIC BLOOD PRESSURE: 74 MMHG | BODY MASS INDEX: 30.31 KG/M2 | HEIGHT: 68 IN | WEIGHT: 200 LBS | TEMPERATURE: 97.6 F | RESPIRATION RATE: 16 BRPM

## 2023-10-25 DIAGNOSIS — Z78.9 ALCOHOL USE: ICD-10-CM

## 2023-10-25 DIAGNOSIS — I27.20 PULMONARY HYPERTENSION (HCC): Chronic | ICD-10-CM

## 2023-10-25 DIAGNOSIS — N13.8 BPH WITH OBSTRUCTION/LOWER URINARY TRACT SYMPTOMS: Chronic | ICD-10-CM

## 2023-10-25 DIAGNOSIS — M19.90 ARTHRITIS: ICD-10-CM

## 2023-10-25 DIAGNOSIS — I49.01 VENTRICULAR FIBRILLATION (HCC): ICD-10-CM

## 2023-10-25 DIAGNOSIS — D59.10 AUTOIMMUNE HEMOLYTIC ANEMIA (HCC): ICD-10-CM

## 2023-10-25 DIAGNOSIS — N40.1 BPH WITH OBSTRUCTION/LOWER URINARY TRACT SYMPTOMS: Chronic | ICD-10-CM

## 2023-10-25 DIAGNOSIS — F51.01 PRIMARY INSOMNIA: ICD-10-CM

## 2023-10-25 DIAGNOSIS — I45.81 LONG QT SYNDROME TYPE 2: ICD-10-CM

## 2023-10-25 DIAGNOSIS — E78.00 HYPERCHOLESTEROLEMIA: Chronic | ICD-10-CM

## 2023-10-25 DIAGNOSIS — I48.0 PAROXYSMAL A-FIB (HCC): Primary | ICD-10-CM

## 2023-10-25 DIAGNOSIS — I10 ESSENTIAL HYPERTENSION: Chronic | ICD-10-CM

## 2023-10-25 DIAGNOSIS — G47.33 OSA (OBSTRUCTIVE SLEEP APNEA): Chronic | ICD-10-CM

## 2023-10-25 DIAGNOSIS — C91.10 CLL (CHRONIC LYMPHOCYTIC LEUKEMIA) (HCC): Primary | ICD-10-CM

## 2023-10-25 DIAGNOSIS — I50.42 CHRONIC COMBINED SYSTOLIC AND DIASTOLIC CONGESTIVE HEART FAILURE (HCC): ICD-10-CM

## 2023-10-25 DIAGNOSIS — I48.0 PAROXYSMAL A-FIB (HCC): ICD-10-CM

## 2023-10-25 DIAGNOSIS — C91.10 CLL (CHRONIC LYMPHOCYTIC LEUKEMIA) (HCC): Chronic | ICD-10-CM

## 2023-10-25 DIAGNOSIS — E53.8 VITAMIN B 12 DEFICIENCY: ICD-10-CM

## 2023-10-25 PROCEDURE — 99214 OFFICE O/P EST MOD 30 MIN: CPT | Performed by: INTERNAL MEDICINE

## 2023-10-25 PROCEDURE — 93000 ELECTROCARDIOGRAM COMPLETE: CPT | Performed by: INTERNAL MEDICINE

## 2023-10-25 RX ORDER — ZOLPIDEM TARTRATE 5 MG/1
TABLET ORAL
Qty: 30 TABLET | Refills: 0 | Status: SHIPPED | OUTPATIENT
Start: 2023-10-25

## 2023-10-25 NOTE — PATIENT INSTRUCTIONS
1) Complete limited echo in 1 year prior to next appointment; test ordered   - Call central scheduling at [phone] to schedule

## 2023-10-25 NOTE — LETTER
October 25, 2023     Zack Thompson MD  [address]  [facility] [id number]    Patient: Toribio Gan   YOB: 1950   Date of Visit: 10/25/2023       Dear Dr. Ruben Mcgowan: Thank you for referring Mariah Sandoval to me for evaluation. Below are my notes for this consultation. If you have questions, please do not hesitate to call me. I look forward to following your patient along with you. Sincerely,        Simone Salguero MD        CC: Yolanda Warren MD  10/25/2023  9:55 AM  Sign when Signing Visit  Progress Note - Electrophysiology-Cardiology (EP)   Toribio Gan 72 y.o. male MRN: [id number]  Unit/Bed#:  Encounter: [id number]           1. Paroxysmal A-fib (HCC)  POCT ECG      2. DIXIE not currently treated        3. Essential hypertension        4. Pulmonary hypertension ([address])        5. Ventricular fibrillation ([address])        6. Long QT syndrome type 2        7. CLL (chronic lymphocytic leukemia) ([address])        8. Hypercholesterolemia        9. Autoimmune hemolytic anemia (HCC)        10.  Alcohol use            Summary of my recommendations   EF improved to 45%  VF and post ICD - Device optimization if can have narrower QRS, QTc and RBBB morphology  Continue with dabigatran for PAF  Continue nadolol has Long QT 2  Avoid other QT prolonging meds           Clinical conditions  Cardiac arrest necessitating external resuscitation and shock  Long QT type II  Bradycardia at baseline  Intrinsic ventricular rate 50/min, progress to 2-1 heart block, progressed to complete heart block during the procedure  Post BiV ICD     Cardiomyopathy with LVEF 30% currently - EF 60% in June 2022     QT prolonging medication  Paroxysmal A-fib  Alcohol use  Obesity  Alzheimer's dementia  History of CLL  Hypertension  UTI            Assessment/Plan    Cardiac arrest necessitating external resuscitation and shock  Long QT type II  Bradycardia at baseline  Intrinsic ventricular rate 50/min, progress to 2-1 heart block, progressed to complete heart block during the procedure  Post BiV ICD    Patient currently on nadolol although this has limited effect and long QT type II  Patient should be evaluated for QT genotype as we think he is type II based on his son being positive for type II  Patient does have BiV ICD in place  Avoid all QT prolonging conditions-hypokalemia, medications, bradycardia          Cardiomyopathy with LVEF 30% post arrest  - EF 60% in June 2022 - currently improved to 45% oct 2023  Patient is on optimal therapy for heart failure  Repeat an echocardiogram in 2 to 3 months time  Medications can be titrated after that as needed by primary cardiologist           QT prolonging medication  Avoid all QT prolonging medication          Paroxysmal A-fib  Long-term anticoagulation  Currently we are going with rate control and anticoagulation strategy  Rate control nadolol  Anticoagulation-dabigatran      Alcohol use  Advised to avoid alcohol  Prior history of significant alcohol use      Obesity  BMI 30  Diet is the most important way to lose weight      Alzheimer's dementia  At baseline      History of CLL  Follow-up with primary      Hypertension  126/78  Currently on losartan, nadolol      UTI  Self catheterizes      Mixed hyperlipidemia  On atorvastatin  No myositis or myalgia            History of Present Illness  HPI: Talia Victoria is a 72y.o. year old male following up post VF arrest and BiV ICD placement     Slowly improving in activity and EF is up to 45%    The patient has significant medical illnesses which include  Cardiac arrest necessitating external resuscitation and shock  Long QT type II  Bradycardia at baseline  Intrinsic ventricular rate 50/min, progress to 2-1 heart block, progressed to complete heart block during the procedure  Post BiV ICD  Cardiomyopathy with LVEF 30% currently - EF 60% in June 2022  QT prolonging medication  Paroxysmal A-fib  Alcohol use  Obesity  Alzheimer's dementia  History of CLL  Hypertension  UTI    Patient does have a long history of multiple diseases  Has undergone prior diverticular surgery, TURP, self catheterizes for urinary retention    He has slowly started moving around  Not complaining of angina, worsening orthopnea or PND or leg swelling  No further complaints of presyncope or syncope  Activities are limited and has exertional intolerance      Patient does have a history of snoring, morning fatigue and daytime sleepiness      Historical Information  Past Medical History:   Diagnosis Date   • Anxiety    • BPH (benign prostatic hypertrophy)    • Cancer ([address])     CLL   • CLL (chronic lymphocytic leukemia) ([address])     2009   • Colon polyp    • Concussion     Resolved: 08/22/16   • Depression    • Diverticulitis 01/13/2020 2014 CT done 11/19 12/19 CT done    • E coli bacteremia 06/27/2021 2/2 blood cultures with Ermelinda Roxanne appears to be the urine    • Epididymitis 05/19/2021 5/2021   • Gastrointestinal hemorrhage     Last assessed: 08/27/13   • GERD (gastroesophageal reflux disease)    • H/O vitamin D deficiency 07/07/2021   • Hearing loss of aging    • Hiatal hernia    • History of right hip replacement 04/19/2021   • History of transfusion    • Hyperlipidemia    • Hypertension    • Hyponatremia 03/06/2023   • Iron deficiency anemia    • Microscopic hematuria     Last assessed: 06/28/13   • OA (osteoarthritis)     right hip   • Obesity (BMI 30.0-34.9) 04/07/2022   • Pneumothorax    • Primary osteoarthritis of right hip 09/29/2017    He is status post right hip arthroplasty   • Prostatitis    • Pulmonary hypertension ([address])    • QT prolongation    • Seasonal allergies    • Sepsis ([address]) 03/06/2023   • Unresponsive episode 03/07/2023   • Urinary tract infection associated with catheterization of urinary tract  02/05/2021    Pseudomonal UTI asymptomatic. Per Urology do not treat at this time.    • Urinary tract infection associated with catheterization of urinary tract  02/05/2021    Pseudomonal UTI asymptomatic. Per Urology do not treat at this time. He did have urosepsis from E coli 7/21   • UTI (urinary tract infection)     in past   • Wears glasses      Past Surgical History:   Procedure Laterality Date   • BLADDER SURGERY     • CARDIAC CATHETERIZATION Left 3/7/2023    Procedure: Cardiac Left Heart Cath;  Surgeon: Qi Young MD;  Location: [facility]; Service: Cardiology   • CARDIAC CATHETERIZATION N/A 3/7/2023    Procedure: Cardiac temporary pacemaker;  Surgeon: Qi Young MD;  Location: [facility]; Service: Cardiology   • CARDIAC ELECTROPHYSIOLOGY PROCEDURE N/A 3/8/2023    Procedure: Cardiac icd implant;  Surgeon: Cleve Olguin MD;  Location: [facility];   Service: Cardiology   • COLONOSCOPY     • CYSTOSCOPY  10/09/2014    Diagnostic   • CYSTOSCOPY  06/29/2020   • FL CYSTOGRAM  08/15/2022   • FRACTURE SURGERY      left lower arm   • FRACTURE SURGERY      left femur   • HERNIA REPAIR     • JOINT REPLACEMENT Right     hip   • OTHER SURGICAL HISTORY  03/2011    Spinal anesthesia epidural   • SD ARTHRP ACETBLR/PROX FEM PROSTC AGRFT/ALGRFT Right 09/29/2017    Procedure: ARTHROPLASTY HIP TOTAL ANTERIOR;  Surgeon: Derrek Bush MD;  Location: [facility];  Service: Orthopedics   • TONSILLECTOMY AND ADENOIDECTOMY     • TRANSURETHRAL RESECTION OF PROSTATE      x 2   • WRIST SURGERY       Social History     Substance and Sexual Activity   Alcohol Use Yes   • Alcohol/week: 10.0 standard drinks of alcohol   • Types: 10 Cans of beer per week    Comment: socially     Social History     Substance and Sexual Activity   Drug Use Not Currently   • Types: Marijuana    Comment: "medical marijuana"     Social History     Tobacco Use   Smoking Status Former   • Packs/day: 1.00   • Years: 15.00   • Total pack years: 15.00   • Types: Cigarettes   • Start date: 67   • Quit date: 9/5/1980   • Years since quittin.1   • Passive exposure: Past   Smokeless Tobacco Never     Social History     Socioeconomic History   • Marital status: /Civil Union     Spouse name: Not on file   • Number of children: Not on file   • Years of education: Not on file   • Highest education level: Not on file   Occupational History   • Not on file   Tobacco Use   • Smoking status: Former     Packs/day: 1.00     Years: 15.00     Total pack years: 15.00     Types: Cigarettes     Start date: 67     Quit date: 1980     Years since quittin.1     Passive exposure: Past   • Smokeless tobacco: Never   Vaping Use   • Vaping Use: Never used   Substance and Sexual Activity   • Alcohol use: Yes     Alcohol/week: 10.0 standard drinks of alcohol     Types: 10 Cans of beer per week     Comment: socially   • Drug use: Not Currently     Types: Marijuana     Comment: "medical marijuana"   • Sexual activity: Not Currently   Other Topics Concern   • Not on file   Social History Narrative   • Not on file     Social Determinants of Health     Financial Resource Strain: Low Risk  (10/23/2023)    Overall Financial Resource Strain (CARDIA)    • Difficulty of Paying Living Expenses: Not hard at all   Food Insecurity: No Food Insecurity (3/9/2023)    Hunger Vital Sign    • Worried About Running Out of Food in the Last Year: Never true    • Ran Out of Food in the Last Year: Never true   Transportation Needs: No Transportation Needs (10/23/2023)    PRAPARE - Transportation    • Lack of Transportation (Medical): No    • Lack of Transportation (Non-Medical):  No   Physical Activity: Not on file   Stress: Not on file   Social Connections: Not on file   Intimate Partner Violence: Not on file   Housing Stability: Low Risk  (3/9/2023)    Housing Stability Vital Sign    • Unable to Pay for Housing in the Last Year: No    • Number of Places Lived in the Last Year: 1    • Unstable Housing in the Last Year: No     .  Family History:  Family History   Problem Relation Age of Onset   • No Known Problems Mother    • Heart attack Father    • Diabetes Brother    • Prostate cancer Brother          Meds/Allergies     No current facility-administered medications for this visit. (Not in a hospital admission)      Allergies   Allergen Reactions   • Ciprofloxacin Other (See Comments)     LONG QT syndrome   • Codeine Other (See Comments)     agitated   • Sulfamethoxazole-Trimethoprim GI Intolerance, Abdominal Pain and Other (See Comments)     upset stomach           Objective  Vitals:   Visit Vitals  /74 (BP Location: Right arm, Patient Position: Sitting, Cuff Size: Standard)   Pulse 71   Ht 5' 8" (1.727 m)   Wt 91.1 kg (200 lb 12.8 oz)   BMI 30.53 kg/m²   Smoking Status Former   BSA 2.05 m²        Invasive Devices       None                     ROS  Review of Systems   All other systems reviewed and are negative. As described in my history of present illness        PHYSICAL EXAM  Physical Exam  Constitutional:       General: He is not in acute distress. Appearance: Normal appearance. He is obese. He is not ill-appearing. HENT:      Head: Normocephalic and atraumatic. Right Ear: External ear normal.      Left Ear: External ear normal.      Nose: Nose normal.      Mouth/Throat:      Comments: Posterior pharynx is crowded  Eyes:      General: No scleral icterus. Extraocular Movements: Extraocular movements intact. Conjunctiva/sclera: Conjunctivae normal.      Pupils: Pupils are equal, round, and reactive to light. Neck:      Comments: Short thick neck  Cardiovascular:      Rate and Rhythm: Normal rate and regular rhythm. Heart sounds: Normal heart sounds. No murmur heard. Comments: paced  Pulmonary:      Effort: No respiratory distress. Breath sounds: Examination of the right-middle field reveals decreased breath sounds. Examination of the left-middle field reveals decreased breath sounds.  Examination of the right-lower field reveals decreased breath sounds. Examination of the left-lower field reveals decreased breath sounds. Decreased breath sounds present. Abdominal:      Palpations: Abdomen is soft. Comments: Central obesity present   Musculoskeletal:         General: No swelling or deformity. Skin:     Coloration: Skin is not jaundiced. Findings: No bruising or lesion. Neurological:      Mental Status: He is alert and oriented to person, place, and time. Mental status is at baseline. Psychiatric:         Mood and Affect: Mood normal.         Behavior: Behavior normal.         Thought Content: Thought content normal.         Judgment: Judgment normal.         LAB RESULTS:      CBC:  Results from Last 12 Months   Lab Units 10/19/23  0921 07/26/23  0724 07/02/23  0520 07/01/23  0541 06/30/23  1322 04/18/23  0840 03/28/23  1214 03/11/23  0635 03/09/23  0516 03/08/23  0427 03/07/23  2326   WBC Thousand/uL 48.15* 39.13* 12.91* 18.72* 33.45* 28.93*   < > 22.48*   < > 15.53* 15.23*   HEMOGLOBIN g/dL 12.6 12.5 9.5* 9.3* 11.8* 12.3   < > 8.7*   < > 9.3* 9.1*   HEMATOCRIT % 39.8 37.6 29.7* 29.1* 34.7* 37.2   < > 27.0*   < > 27.9* 27.4*   MCV fL 101* 100* 105* 102* 98 104*   < > 110*   < > 107* 107*   PLATELETS Thousands/uL 226 183 106* 111* 177 206   < > 130*   < > 116* 123*   RBC Million/uL 3.93 3.78* 2.82* 2.85* 3.53* 3.59*   < > 2.46*   < > 2.62* 2.57*   MCH pg 32.1 33.1 33.7 32.6 33.4 34.3   < > 35.4*   < > 35.5* 35.4*   MCHC g/dL 31.7 33.2 32.0 32.0 34.0 33.1   < > 32.2   < > 33.3 33.2   RDW % 14.6 14.5 14.6 14.5 14.3 13.6   < > 14.6   < > 14.0 14.1   MPV fL 10.7 11.1 10.3 10.1 9.6 10.7   < > 10.2   < > 9.7 10.1   NRBC AUTO /100 WBCs  --   --  0  --   --  0  --  0  --  0 0    < > = values in this interval not displayed.       CMP:  Results from Last 12 Months   Lab Units 10/19/23  0921 10/09/23  1035 10/05/23  0743 07/26/23  0724 07/02/23  0520 07/01/23  0541 06/30/23  1322 04/18/23  0840   POTASSIUM mmol/L 5.1 4.5 4.5 4.9 4.0 3.8 5.0 4.9   CHLORIDE mmol/L 101 100 98 100 103 101 100 101   CO2 mmol/L 28 29 29 29 27 25 25 27   BUN mg/dL 15 13 10 15 7 10 15 12   CREATININE mg/dL 0.91 0.83 0.80 0.66 0.70 0.73 0.84 0.82   CALCIUM mg/dL 9.4 9.3 9.4 9.4 8.2* 8.1* 9.2 9.0   AST U/L 21  --   --  37  --  17 27 32   ALT U/L 24  --   --  49  --  24 33 53   ALK PHOS U/L 90  --   --  92  --  66 89 93   EGFR ml/min/1.73sq m 84 88 89 97 95 93 88 89      Magnesium:   Results from Last 12 Months   Lab Units 10/09/23  1035 10/05/23  0743 04/18/23  0840 03/10/23  0541 03/09/23  0516   MAGNESIUM mg/dL 2.0 2.2 2.3 2.4 2.4     A1C:  Results from Last 12 Months   Lab Units 10/19/23  0921 03/07/23  0605   HEMOGLOBIN A1C % 5.8* 5.4      TSH:  Component Ref Range & Units 4/18/23  8:40 AM 3/6/23  1:57 PM 10/11/22  7:45 AM 1/5/22  8:01 AM 10/6/21  8:39 AM 7/29/21  8:33 AM 1/15/21  3:53 PM   TSH 3RD GENERATON 0.450 - 4.500 uIU/mL 1.450 1.754 CM 1.080 CM 1.200 R 0.984 R 0.961 R 0.325 Low  R, CM          PT/INR:  Results from Last 12 Months   Lab Units 03/06/23  1357   PROTIME seconds 15.6*   INR  1.25*     Lipid Panel:  Results from Last 12 Months   Lab Units 10/19/23  0921 04/18/23  0840 03/07/23  0605   CHOLESTEROL mg/dL 123 118 107   TRIGLYCERIDES mg/dL 120 118 87   HDL mg/dL 29* 31* 40   NON-HDL-CHOL (CHOL-HDL) mg/dl  --   --  67        Imaging:      EKG  08/30/2023          Echocardiogram:    Echo Follow Up/Limited  08/21/2023    Interpretation Summary    •  Left Ventricle: Left ventricular cavity size is normal. Wall thickness is normal. The left ventricular ejection fraction is 45%. Systolic function is mildly reduced. There is mild global hypokinesis with akinesis of the basal inferior myocardial wall segment. •  Left Atrium: The atrium is mildly dilated. •  Right Atrium: The atrium is mildly dilated. •  Aortic Valve: There is mild regurgitation. •  Mitral Valve: There is mild annular calcification. •  Tricuspid Valve: There is mild regurgitation.      Echo complete with contrast if indicated    Results for orders placed during the hospital encounter of 01/10/21    [address], [address] [facility]  [phone]    Transthoracic Echocardiogram  2D, M-mode, Doppler, and Color Doppler    Study date:  15-Som-2021    Patient: Evert Man  MR number: [id number]  Account number: [de-identified]  : 1950  Age: 78 years  Gender: Male  Status: Inpatient  Location: ICU  Height: 66 in  Weight: 195 lb  BP: 149/ 71 mmHg    Indications: New V-Tach, COVID-19. Diagnoses: I47.2 - Ventricular tachycardia    Sonographer:  HARJINDER Garcia  Primary Physician:  Alyx Metzger MD  Referring Physician:  Alysia Haynes MD  Group:  [facility]  Interpreting Physician:  Gilberto Neves DO    SUMMARY    SUMMARY:  Limited study. LEFT VENTRICLE:  Systolic function was normal. Ejection fraction was estimated to be 60 %. Although no diagnostic regional wall motion abnormality was identified, this possibility cannot be completely excluded on the basis of this study. RIGHT VENTRICLE:  The ventricle was mildly to moderately dilated. Systolic function was mildly reduced. Free wall hypokinesis. LEFT ATRIUM:  The atrium was dilated. MITRAL VALVE:  There was mild regurgitation. AORTIC VALVE:  There was mild to moderate regurgitation. TRICUSPID VALVE:  There was moderate regurgitation. Estimated peak PA pressure was 75 mmHg. IVC, HEPATIC VEINS:  The inferior vena cava was dilated, with poor inspiratory collapse, consistent with elevated right atrial pressure. HISTORY: PRIOR HISTORY: COVID-19 +, hypertension, PHTN, Luekemia, Alcohol Abuse, Former Smoker. PROCEDURE: The study was performed in the ICU. This was a routine study. The transthoracic approach was used. The study included complete 2D imaging, M-mode, complete spectral Doppler, and color Doppler.  The heart rate was 47 bpm, at the  start of the study. Images were obtained from the parasternal, apical, subcostal, and suprasternal notch acoustic windows. Image quality was adequate. LEFT VENTRICLE: Size was normal. Systolic function was normal. Ejection fraction was estimated to be 60 %. Although no diagnostic regional wall motion abnormality was identified, this possibility cannot be completely excluded on the basis  of this study. RIGHT VENTRICLE: The ventricle was mildly to moderately dilated. Systolic function was mildly reduced. Free wall hypokinesis. Wall thickness was normal.    LEFT ATRIUM: The atrium was dilated. RIGHT ATRIUM: Size was normal.    MITRAL VALVE: Valve structure was normal. There was normal leaflet separation. DOPPLER: The transmitral velocity was within the normal range. There was no evidence for stenosis. There was mild regurgitation. AORTIC VALVE: The valve was trileaflet. Leaflets exhibited normal thickness and normal cuspal separation. DOPPLER: Transaortic velocity was within the normal range. There was no evidence for stenosis. There was mild to moderate  regurgitation. TRICUSPID VALVE: The valve structure was normal. There was normal leaflet separation. DOPPLER: The transtricuspid velocity was within the normal range. There was no evidence for stenosis. There was moderate regurgitation. Estimated peak PA  pressure was 75 mmHg. PULMONIC VALVE: Not well visualized. PERICARDIUM: There was no pericardial effusion. The pericardium was normal in appearance. AORTA: The root exhibited normal size. SYSTEMIC VEINS: IVC: The inferior vena cava was dilated, with poor inspiratory collapse, consistent with elevated right atrial pressure.     SYSTEM MEASUREMENT TABLES    2D  %FS: 33.33 %  Ao Diam: 3.62 cm  EDV(Teich): 99.88 ml  EF(Teich): 62.02 %  ESV(Teich): 37.93 ml  IVSd: 1.15 cm  LA Area: 26.46 cm2  LA Diam: 4.59 cm  LVEDV MOD A4C: 166.8 ml  LVEF MOD A4C: 60.98 %  LVESV MOD A4C: 65.09 ml  LVIDd: 4.65 cm  LVIDs: 3.1 cm  LVLd A4C: 9.42 cm  LVLs A4C: 7.89 cm  LVPWd: 1.03 cm  RA Area: 16.54 cm2  RVIDd: 5.01 cm  SV MOD A4C: 101.71 ml  SV(Teich): 61.94 ml    CW  AR Dec [place]: 3.09 m/s2  AR Dec Time: 1445.59 ms  AR PHT: 419.22 ms  AR Vmax: 4.47 m/s  AR maxP.75 mmHg  AV Env. Ti: 304.47 ms  AV MaxP.7 mmHg  AV VTI: 33.72 cm  AV Vmax: 1.78 m/s  AV Vmean: 1.11 m/s  AV meanP.58 mmHg  MR VTI: 204.77 cm  MR Vmax: 4.56 m/s  MR Vmean: 3.84 m/s  MR maxP.27 mmHg  MR meanP.52 mmHg  TR MaxP.73 mmHg  TR Vmax: 3.96 m/s    PW  LVOT Env. Ti: 374.88 ms  LVOT VTI: 24.53 cm  LVOT Vmax: 1.04 m/s  LVOT Vmean: 0.65 m/s  LVOT maxP.42 mmHg  LVOT meanP.05 mmHg  MV A Adithya: 0.16 m/s  MV Dec [place]: 2.45 m/s2  MV DecT: 254.72 ms  MV E Adithya: 0.62 m/s  MV E/A Ratio: 3.99  MV PHT: 73.87 ms  MVA By PHT: 2.98 cm2    [facility] Accredited Echocardiography Laboratory    Prepared and electronically signed by    Kanwal Wan DO  Signed 15-Som-2021 12:34:36      Stress Test:   No results found for this or any previous visit. Cardiac Catheterization:    Cardiac Left Heart Cath, Cardiac Temporary Pacemaker  2023    Coronary Findings    Diagnostic  Dominance: Right    Left Main   The vessel was visualized by angiography, is moderate in size and is angiographically normal.      Left Anterior Descending   The vessel is moderate in size. Prox LAD to Mid LAD lesion is 40% stenosed. YAMILETH flow is 3. Left Circumflex   The vessel is large and is angiographically normal.      Right Coronary Artery   The vessel is moderate in size, is angiographically normal and dominant. DEVICE CHECK:    Results for orders placed or performed in visit on 23   Cardiac EP device report    Narrative    MDT BI-V ICD (DDIR) ACTIVE SYSTEM IS MRI CONDITIONAL  CARELINK TRANSMISSION: BATTERY STATUS "6 YRS." AP 99% CRT PACINIG (BIV) 100% (LV) 0%. ALL AVAILABLE LEAD PARAMETERS WITHIN NORMAL LIMITS.  NO SIGNIFICANT HIGH RATE EPISODES. VENT SENSING MARKERS, PRESENT AS FUSION & PVCS. PT ON NADOLOL & PRADAXA. EF 45% (ECHO 2023). OPTI-VOL WITHIN NORMAL LIMITS. NORMAL DEVICE FUNCTION.  NC            Code Status: [unfilled]  Advance Directive and Living Will: Yes    Power of :    POLST:      Counseling / Coordination of Care  Detailed discussion done with regards to following QT      Gabe Smith MD

## 2023-10-25 NOTE — PROGRESS NOTES
HPI:   Pradeep Avelar is a 72 y.o. male. Patient is here with his wife. He has a history of CLL. He was being followed by Dr. Gilda Roe and was on surveillance. He is not symptomatic from CLL at this time. There has been increase in WBC and lymphocyte count. He usually comes to the office once every 6 months and blood counts before his visit in 6 months but this time he has agreed to come in 4 months. CLL was diagnosed in March 2010. He had iron deficiency also previously and had GI evaluation and had Venofer. He follows with his gastroenterologist.  In May 2021 he was diagnosed to have autoimmune hemolytic anemia with a profound drop in hemoglobin and he was treated with prednisone. He does not have active hemolysis at present and hemoglobin is stable. For enlarged prostate he follows with a urologist.  He has history of QT prolongation and has a pacemaker/defibrillator and he follows with his cardiologist and electrophysiologist.  He also follows with his lung specialist.  Patient states he has been feeling reasonably well. Has some tiredness and minimal exertional dyspnea. Has arthritis and low back discomfort. He gives history of B12 deficiency and has been taking B12 orally.             Current Outpatient Medications:     Acetaminophen 500 MG, Take 1,000 mg by mouth prn, Disp: , Rfl:     ascorbic acid (VITAMIN C) 250 MG CHEW, Chew 250 mg daily, Disp: , Rfl:     atorvastatin (LIPITOR) 20 mg tablet, TAKE ONE TABLET BY MOUTH ONCE EVERY DAY, Disp: 90 tablet, Rfl: 3    Blood Pressure Monitoring (Omron 5 Series BP Monitor) PEE, , Disp: , Rfl:     busPIRone (BUSPAR) 7.5 mg tablet, 1 tablet in the morning and 1 at night, Disp: 180 tablet, Rfl: 3    dabigatran etexilate (PRADAXA) 150 mg capsu, Take 1 capsule (150 mg total) by mouth 2 (two) times a day, Disp: 180 capsule, Rfl: 1    Lactobacillus (PROBIOTIC ACIDOPHILUS PO), Take by mouth, Disp: , Rfl:     LORazepam (Ativan) 0.5 mg tablet, Take 1 tablet (0.5 mg total) by mouth every 8 (eight) hours as needed for anxiety, Disp: 60 tablet, Rfl: 1    losartan (COZAAR) 50 mg tablet, Take 1 tablet (50 mg total) by mouth daily, Disp: 90 tablet, Rfl: 1    montelukast (SINGULAIR) 10 mg tablet, TAKE ONE TABLET BY MOUTH DAILY, Disp: 90 tablet, Rfl: 3    nadolol (CORGARD) 80 MG tablet, Take 1 tablet (80 mg total) by mouth daily in the early morning, Disp: 90 tablet, Rfl: 3    Omega-3 Fatty Acids (FISH OIL) 1,000 mg, Take by mouth daily, Disp: , Rfl:     omeprazole (PriLOSEC) 40 MG capsule, Take one capsule by mouth once every day., Disp: 90 capsule, Rfl: 3    spironolactone (ALDACTONE) 25 mg tablet, Take 1 tablet (25 mg total) by mouth daily, Disp: 90 tablet, Rfl: 1    vitamin B-12 (CYANOCOBALAMIN) 500 MCG TABS, Take 1 tablet (500 mcg total) by mouth daily, Disp: 30 tablet, Rfl: 0    zolpidem (AMBIEN) 5 mg tablet, Take 1/2-1 full tablet at bedtime as needed for sleep. Do not take if drinking any alcohol or taking lorazepam, Disp: 30 tablet, Rfl: 0    docusate sodium (COLACE) 100 mg capsule, Take 1 capsule (100 mg total) by mouth 2 (two) times a day, Disp: 60 capsule, Rfl: 0    Allergies   Allergen Reactions    Ciprofloxacin Other (See Comments)     LONG QT syndrome    Codeine Other (See Comments)     agitated    Sulfamethoxazole-Trimethoprim GI Intolerance, Abdominal Pain and Other (See Comments)     upset stomach       Oncology History    No history exists. ROS:  10/25/23 Reviewed 12 systems: See symptoms in HPI  Presently no other neurological, cardiac, pulmonary, GI and  symptoms other than mentioned in HPI. Other symptoms are in HPI. No fever, chills, bleeding, bone pains, skin rash, weight loss , night sweats,  weakness, numbness,  claudication and gait problem. No frequent infections. Not unusually sensitive to heat or cold. No swelling of the ankles. No swollen glands. Patient is anxious.        /74 (BP Location: Right arm, Patient Position: Sitting, Cuff Size: Adult)   Pulse 83   Temp 97.6 °F (36.4 °C) (Temporal)   Resp 16   Ht 5' 8" (1.727 m)   Wt 90.7 kg (200 lb)   SpO2 98%   BMI 30.41 kg/m²     Physical Exam:  Alert, oriented, not in distress, vitals are above, no icterus, no oral thrush, no palpable neck mass, clear lung fields, regular heart rate, pacemaker/defibrillator, abdomen  soft and non tender, no palpable abdominal mass, no ascites, no edema of ankles, no calf tenderness, no focal neurological deficit, no skin rash, no palpable lymphadenopathy in the neck and axillary areas, no clubbing. Patient is anxious. Performance status 1. IMAGING:  No orders to display       LABS:    Results for orders placed or performed in visit on 10/19/23   CBC and differential   Result Value Ref Range    WBC 48.15 (HH) 4.31 - 10.16 Thousand/uL    RBC 3.93 3.88 - 5.62 Million/uL    Hemoglobin 12.6 12.0 - 17.0 g/dL    Hematocrit 39.8 36.5 - 49.3 %     (H) 82 - 98 fL    MCH 32.1 26.8 - 34.3 pg    MCHC 31.7 31.4 - 37.4 g/dL    RDW 14.6 11.6 - 15.1 %    MPV 10.7 8.9 - 12.7 fL    Platelets 775 151 - 349 Thousands/uL   Comprehensive metabolic panel   Result Value Ref Range    Sodium 136 135 - 147 mmol/L    Potassium 5.1 3.5 - 5.3 mmol/L    Chloride 101 96 - 108 mmol/L    CO2 28 21 - 32 mmol/L    ANION GAP 7 mmol/L    BUN 15 5 - 25 mg/dL    Creatinine 0.91 0.60 - 1.30 mg/dL    Glucose, Fasting 111 (H) 65 - 99 mg/dL    Calcium 9.4 8.4 - 10.2 mg/dL    AST 21 13 - 39 U/L    ALT 24 7 - 52 U/L    Alkaline Phosphatase 90 34 - 104 U/L    Total Protein 7.7 6.4 - 8.4 g/dL    Albumin 4.5 3.5 - 5.0 g/dL    Total Bilirubin 0.62 0.20 - 1.00 mg/dL    eGFR 84 ml/min/1.73sq m   Hemoglobin A1C   Result Value Ref Range    Hemoglobin A1C 5.8 (H) Normal 4.0-5.6%; PreDiabetic 5.7-6.4%;  Diabetic >=6.5%; Glycemic control for adults with diabetes <7.0% %     mg/dl   Lipid Panel with Direct LDL reflex   Result Value Ref Range    Cholesterol 123 See Comment mg/dL Triglycerides 120 See Comment mg/dL    HDL, Direct 29 (L) >=40 mg/dL    LDL Calculated 70 0 - 100 mg/dL   Manual Differential(PHLEBS Do Not Order)   Result Value Ref Range    Segmented % 7 (L) 43 - 75 %    Lymphocytes % 92 (H) 14 - 44 %    Monocytes % 1 (L) 4 - 12 %    Eosinophils, % 0 0 - 6 %    Basophils % 0 0 - 1 %    Absolute Neutrophils 3.37 1.85 - 7.62 Thousand/uL    Lymphocytes Absolute 44.30 (H) 0.60 - 4.47 Thousand/uL    Monocytes Absolute 0.48 0.00 - 1.22 Thousand/uL    Eosinophils Absolute 0.00 0.00 - 0.40 Thousand/uL    Basophils Absolute 0.00 0.00 - 0.10 Thousand/uL    Total Counted      Smudge Cells Present     Platelet Estimate Adequate Adequate    Large Platelet Present     Anisocytosis Present     Ovalocytes Present     Polychromasia Present      *Note: Due to a large number of results and/or encounters for the requested time period, some results have not been displayed. A complete set of results can be found in Results Review. Labs, Imaging, & Other studies:   All pertinent labs and imaging studies were personally reviewed    Lab Results   Component Value Date     (L) 07/20/2015    K 5.1 10/19/2023     10/19/2023    CO2 28 10/19/2023    ANIONGAP 9 07/20/2015    BUN 15 10/19/2023    CREATININE 0.91 10/19/2023    GLUCOSE 130 01/10/2021    GLUF 111 (H) 10/19/2023    CALCIUM 9.4 10/19/2023    CORRECTEDCA 9.1 09/28/2022    AST 21 10/19/2023    ALT 24 10/19/2023    ALKPHOS 90 10/19/2023    PROT 8.0 07/20/2015    BILITOT 0.47 07/20/2015    EGFR 84 10/19/2023     Lab Results   Component Value Date    WBC 48.15 (HH) 10/19/2023    HGB 12.6 10/19/2023    HCT 39.8 10/19/2023     (H) 10/19/2023     10/19/2023   Lymphocyte 44,300. Harris 3370. Reviewed and discussed with patient. Patient is here with his wife. He has a history of CLL. He was being followed by Dr. Christina Meier and was on surveillance. He is not symptomatic from CLL at this time.   There has been increase in WBC and lymphocyte count. He usually comes to the office once every 6 months and blood counts before his visit in 6 months but this time he has agreed to come in 4 months. CLL was diagnosed in March 2010. He had iron deficiency also previously and had GI evaluation and had Venofer. He follows with his gastroenterologist.  In May 2021 he was diagnosed to have autoimmune hemolytic anemia with a profound drop in hemoglobin and he was treated with prednisone. He does not have active hemolysis at present and hemoglobin is stable. For enlarged prostate he follows with a urologist.  He has history of QT prolongation and has a pacemaker/defibrillator and he follows with his cardiologist and electrophysiologist.  He also follows with his lung specialist.  Patient states he has been feeling reasonably well. Has some tiredness and minimal exertional dyspnea. Has arthritis and low back discomfort. He gives history of B12 deficiency and has been taking B12 orally. Physical examination and test results are as recorded and discussed in detail. CLL remains under surveillance. As mentioned above he has agreed  to come back in 4 months instead of 6 months. Plan is surveillance for CLL. Goal is to monitor patient's condition and CLL. Patient is capable of self-care. Diet and activities per patient's primary physician and other consultants especially cardiologists. All discussed in detail. Questions answered. Discussed health screening test.  Assessment and plan: See diagnoses, orders and instructions below. 1. CLL (chronic lymphocytic leukemia) (HCC)    - Beta 2 microglobulin, serum; Future  - CBC and differential; Future  - Comprehensive metabolic panel; Future  - IgG, IgA, IgM; Future  - LD,Blood; Future  - Uric acid; Future  - Protein electrophoresis, serum; Future    2. Autoimmune hemolytic anemia (HCC)      3. Paroxysmal A-fib (HCC)      4. Pulmonary hypertension ([address])      5.  Chronic combined systolic and diastolic congestive heart failure ([address])      6. BPH with obstruction/lower urinary tract symptoms      7. Arthritis      8. Vitamin B 12 deficiency        Blood work prior to next visit in 4 months. .  Patient will continue to follow with  primary physician and other consultants. Patient  voiced understanding and agrees      Disclaimer: This document was prepared using a dictation device. If a word or phrase is confusing, or does not make sense, this is likely due to recognition error which was not discovered during the providers review. If you believe an error has occurred, please Contact me through [employer] service for servando? cation. Counseling / Coordination of Care   .   Provided counseling and support

## 2023-11-02 ENCOUNTER — TELEPHONE (OUTPATIENT)
Dept: CARDIOLOGY CLINIC | Facility: CLINIC | Age: 73
End: 2023-11-02

## 2023-11-02 DIAGNOSIS — I48.0 PAROXYSMAL A-FIB (HCC): ICD-10-CM

## 2023-11-02 DIAGNOSIS — I48.0 PAROXYSMAL A-FIB (HCC): Primary | ICD-10-CM

## 2023-11-02 RX ORDER — DABIGATRAN ETEXILATE 150 MG/1
150 CAPSULE ORAL 2 TIMES DAILY
Qty: 180 CAPSULE | Refills: 1 | Status: SHIPPED | OUTPATIENT
Start: 2023-11-02

## 2023-11-02 RX ORDER — DABIGATRAN ETEXILATE 150 MG/1
150 CAPSULE ORAL 2 TIMES DAILY
Qty: 8 CAPSULE | Refills: 0 | Status: SHIPPED | OUTPATIENT
Start: 2023-11-02

## 2023-11-02 NOTE — TELEPHONE ENCOUNTER
Yes, It's Donald Corbin calling again. Date of birth is [birth year]. I called about 2 1/2 hours ago, left a message. I see you called in the script  for my Pradaxa to the pharmacy, but I wanted to talk to someone personally. I had a few questions. I would appreciate a phone call. I've been waiting here. I realize you may be busy, but I am concerned and I can be reached on [phone] and please call as soon as you can.  Thank you

## 2023-11-03 ENCOUNTER — APPOINTMENT (OUTPATIENT)
Dept: RADIOLOGY | Age: 73
End: 2023-11-03
Payer: MEDICARE

## 2023-11-03 DIAGNOSIS — M79.672 LEFT FOOT PAIN: ICD-10-CM

## 2023-11-03 DIAGNOSIS — R26.2 CANNOT WALK: ICD-10-CM

## 2023-11-03 PROCEDURE — 73630 X-RAY EXAM OF FOOT: CPT

## 2023-11-09 ENCOUNTER — TELEPHONE (OUTPATIENT)
Dept: CARDIOLOGY CLINIC | Facility: CLINIC | Age: 73
End: 2023-11-09

## 2023-11-09 NOTE — TELEPHONE ENCOUNTER
Patient called, states he sees a podiatrist, and feels he has neuropathy, and would like to start him on Gabapentin. Patient has QT prolongation and would like to know if it is safe for him to take?     Thanks

## 2023-11-10 NOTE — TELEPHONE ENCOUNTER
Spoke with patient, advised above. States he will not be taking it at this time, but on an add needed basis. He will call if he starts it and to schedule EKG.

## 2023-11-10 NOTE — TELEPHONE ENCOUNTER
Gabapentin should be ok, without QT prolongation. He may come into our office 1 week after starting medication to get ECG to be sure, and be thorough. Please pass him onto clerical team if he agrees.   Uzair PORTER

## 2023-11-22 ENCOUNTER — TELEPHONE (OUTPATIENT)
Dept: DERMATOLOGY | Facility: CLINIC | Age: 73
End: 2023-11-22

## 2023-11-22 ENCOUNTER — OFFICE VISIT (OUTPATIENT)
Dept: DERMATOLOGY | Facility: CLINIC | Age: 73
End: 2023-11-22
Payer: MEDICARE

## 2023-11-22 VITALS — HEIGHT: 68 IN | BODY MASS INDEX: 30.28 KG/M2 | TEMPERATURE: 97.2 F | WEIGHT: 199.8 LBS

## 2023-11-22 DIAGNOSIS — R21 RASH AND NONSPECIFIC SKIN ERUPTION: Primary | ICD-10-CM

## 2023-11-22 PROCEDURE — 99204 OFFICE O/P NEW MOD 45 MIN: CPT | Performed by: STUDENT IN AN ORGANIZED HEALTH CARE EDUCATION/TRAINING PROGRAM

## 2023-11-22 RX ORDER — TRIAMCINOLONE ACETONIDE 1 MG/G
CREAM TOPICAL 2 TIMES DAILY
Qty: 15 G | Refills: 0 | Status: CANCELLED | OUTPATIENT
Start: 2023-11-22

## 2023-11-22 RX ORDER — TRIAMCINOLONE ACETONIDE 1 MG/G
OINTMENT TOPICAL
Qty: 15 G | Refills: 0 | Status: SHIPPED | OUTPATIENT
Start: 2023-11-22

## 2023-11-22 NOTE — PATIENT INSTRUCTIONS
ATOPIC DERMATITIS POSSIBLE ECZEMA PATCH       Assessment and Plan:  Based on a thorough discussion of this condition and the management approach to it (including a comprehensive discussion of the known risks, side effects and potential benefits of treatment), the patient (family) agrees to implement the following specific plan:  Stop OTC anti itch cream   Prescribed steroid cream to use instead   Recommends cutting nails to not scratch in sleep. Follow up scheduled in one month follow up on 1/4/24 at 10:50.    Picture taken in office to monitor

## 2023-11-22 NOTE — PROGRESS NOTES
[facility] Note     Patient Name: Driss Quiñones  Encounter Date: 11/22/23     Have you been cared for by a [facility]ologist in the last 3 years and, if so, which description applies to you? NO. I am considered a "new" patient and must complete all patient intake questions. I am MALE/not capable of bearing children. REVIEW OF SYSTEMS:  Have you recently had or currently have any of the following? Recent fever or chills? No  Any non-healing wound? No   PAST MEDICAL HISTORY:  Have you personally ever had or currently have any of the following? If "YES," then please provide more detail. Skin cancer (such as Melanoma, Basal Cell Carcinoma, Squamous Cell Carcinoma? No  Tuberculosis, HIV/AIDS, Hepatitis B or C: No  Radiation Treatment No   HISTORY OF IMMUNOSUPPRESSION:   Do you have a history of any of the following:  Systemic Immunosuppression such as Diabetes, Biologic or Immunotherapy, Chemotherapy, Organ Transplantation, Bone Marrow Transplantation? No     Answering "YES" requires the addition of the dotphrase "IMMUNOSUPPRESSED" as the first diagnosis of the patient's visit. FAMILY HISTORY:  Any "first degree relatives" (parent, brother, sister, or child) with the following? Skin Cancer, Pancreatic or Other Cancer? No   PATIENT EXPERIENCE:    Do you want the Dermatologist to perform a COMPLETE skin exam today including a clinical examination under the "bra and underwear" areas? NO  If necessary, do we have your permission to call and leave a detailed message on your Preferred Phone number that includes your specific medical information?   Yes      Allergies   Allergen Reactions    Ciprofloxacin Other (See Comments)     LONG QT syndrome    Codeine Other (See Comments)     agitated    Sulfamethoxazole-Trimethoprim GI Intolerance, Abdominal Pain and Other (See Comments)     upset stomach      Current Outpatient Medications:     Acetaminophen 500 MG, Take 1,000 mg by mouth prn, Disp: , Rfl:     ascorbic acid (VITAMIN C) 250 MG CHEW, Chew 250 mg daily, Disp: , Rfl:     atorvastatin (LIPITOR) 20 mg tablet, TAKE ONE TABLET BY MOUTH ONCE EVERY DAY, Disp: 90 tablet, Rfl: 3    Blood Pressure Monitoring (Omron 5 Series BP Monitor) PEE, , Disp: , Rfl:     busPIRone (BUSPAR) 7.5 mg tablet, 1 tablet in the morning and 1 at night, Disp: 180 tablet, Rfl: 3    dabigatran etexilate (PRADAXA) 150 mg capsu, Take 1 capsule (150 mg total) by mouth 2 (two) times a day, Disp: 180 capsule, Rfl: 1    dabigatran etexilate (PRADAXA) 150 mg capsu, Take 1 capsule (150 mg total) by mouth 2 (two) times a day, Disp: 8 capsule, Rfl: 0    docusate sodium (COLACE) 100 mg capsule, Take 1 capsule (100 mg total) by mouth 2 (two) times a day, Disp: 60 capsule, Rfl: 0    Lactobacillus (PROBIOTIC ACIDOPHILUS PO), Take by mouth, Disp: , Rfl:     LORazepam (Ativan) 0.5 mg tablet, Take 1 tablet (0.5 mg total) by mouth every 8 (eight) hours as needed for anxiety, Disp: 60 tablet, Rfl: 1    losartan (COZAAR) 50 mg tablet, Take 1 tablet (50 mg total) by mouth daily, Disp: 90 tablet, Rfl: 1    montelukast (SINGULAIR) 10 mg tablet, TAKE ONE TABLET BY MOUTH DAILY, Disp: 90 tablet, Rfl: 3    nadolol (CORGARD) 80 MG tablet, Take 1 tablet (80 mg total) by mouth daily in the early morning, Disp: 90 tablet, Rfl: 3    Omega-3 Fatty Acids (FISH OIL) 1,000 mg, Take by mouth daily, Disp: , Rfl:     omeprazole (PriLOSEC) 40 MG capsule, Take one capsule by mouth once every day., Disp: 90 capsule, Rfl: 3    spironolactone (ALDACTONE) 25 mg tablet, Take 1 tablet (25 mg total) by mouth daily, Disp: 90 tablet, Rfl: 1    vitamin B-12 (CYANOCOBALAMIN) 500 MCG TABS, Take 1 tablet (500 mcg total) by mouth daily, Disp: 30 tablet, Rfl: 0    zolpidem (AMBIEN) 5 mg tablet, Take 1/2-1 full tablet at bedtime as needed for sleep.   Do not take if drinking any alcohol or taking lorazepam, Disp: 30 tablet, Rfl: 0          Whom besides the patient is providing clinical information about today's encounter? NO ADDITIONAL HISTORIAN (patient alone provided history)    Physical Exam and Assessment/Plan by Diagnosis:      *On blood thinners and has pacemaker     RASH/NON-SPECIFIC SKIN ERUPTION  Physical Exam:  Anatomic Location Affected:  Overlying right clavicle    Morphological Description:  1.4 X 0.7 cm  pink excoriated plaque with fine white scale and without concerning features on dermatoscope  Pertinent Positives:  Pertinent Negatives: Additional History of Present Condition:  Non healing neck/chest area lesion - has had for less than 6 months - itches. Was treating with generic neosporin with no help. Warns CLL increases risk for skin cancer. Assessment and Plan:  - Discussed biopsy versus monitoring with empiric treatment. Decision to monitor as below. Based on a thorough discussion of this condition and the management approach to it (including a comprehensive discussion of the known risks, side effects and potential benefits of treatment), the patient (family) agrees to implement the following specific plan:  Stop OTC anti itch cream   Prescribed Triamcinolone steroid cream to use instead . Educated on side effects of steroids and appropriate use. Recommends cutting nails and wear white cotton gloves (available on [employer]) to not scratch in sleep at night. Follow up scheduled in one month follow up on 1/4/24 at 10:50. Picture taken in office to monitor. If not improving/still present, will likely biopsy at that time.        Scribe Attestation      I,:  Easton Victor am acting as a scribe while in the presence of the attending physician.:       I,:  Elana Hauser MD personally performed the services described in this documentation    as scribed in my presence.:

## 2023-11-22 NOTE — TELEPHONE ENCOUNTER
Please assist with prior authorization for triamcinolone. I was not able to find an alternative covered by insurance.

## 2023-12-11 ENCOUNTER — TELEPHONE (OUTPATIENT)
Dept: DERMATOLOGY | Facility: CLINIC | Age: 73
End: 2023-12-11

## 2023-12-11 NOTE — TELEPHONE ENCOUNTER
Left v/m in regards to needing to reschedule 1/4/24 appointment. Gave number [phone] to call back.

## 2023-12-11 NOTE — TELEPHONE ENCOUNTER
Pt returned call regarding scheduling. Will need to discuss options with Dr. Patty Francisco before finalizing reschedule, said will call back tomorrow regarding new appointment date.

## 2023-12-12 NOTE — TELEPHONE ENCOUNTER
Patient called very upset stating he is returning a call     He stated  that he can not sit around all day waiting for a call back. He expressed that he doesn't understand why its like red tape to speak with the doctors anymore. He would like a call back.

## 2023-12-13 ENCOUNTER — TELEPHONE (OUTPATIENT)
Dept: DERMATOLOGY | Facility: CLINIC | Age: 73
End: 2023-12-13

## 2023-12-13 NOTE — TELEPHONE ENCOUNTER
Patient called upset bc he was given a direct number to call and can't leave a voicemail.   While on the phone he received another call and hung up

## 2023-12-13 NOTE — TELEPHONE ENCOUNTER
Successfully rescheduled Patient for ambassador 1/9/24 at 11 am with Dr. Reji Rausch per Dr. Reji Rausch' ok.

## 2023-12-14 ENCOUNTER — REMOTE DEVICE CLINIC VISIT (OUTPATIENT)
Dept: CARDIOLOGY CLINIC | Facility: CLINIC | Age: 73
End: 2023-12-14
Payer: MEDICARE

## 2023-12-14 DIAGNOSIS — Z95.810 AICD (AUTOMATIC CARDIOVERTER/DEFIBRILLATOR) PRESENT: Primary | ICD-10-CM

## 2023-12-14 PROCEDURE — 93295 DEV INTERROG REMOTE 1/2/MLT: CPT | Performed by: STUDENT IN AN ORGANIZED HEALTH CARE EDUCATION/TRAINING PROGRAM

## 2023-12-14 PROCEDURE — 93296 REM INTERROG EVL PM/IDS: CPT | Performed by: STUDENT IN AN ORGANIZED HEALTH CARE EDUCATION/TRAINING PROGRAM

## 2023-12-14 NOTE — PROGRESS NOTES
Results for orders placed or performed in visit on 12/14/23   Cardiac EP device report    Narrative    MDT BI-V ICD (DDIR) ACTIVE SYSTEM IS MRI CONDITIONAL  CARELINK TRANSMISSION: BATTERY STATUS "6.5 YRS." % CRT PACING (BIV) 100% (LV) 0%. ALL AVAILABLE LEAD PARAMETERS WITHIN NORMAL LIMITS. NO SIGNIFICANT HIGH RATE EPISODES. OPTI-VOL WITHIN NORMAL LIMITS. NORMAL DEVICE FUNCTION.  NC

## 2023-12-26 ENCOUNTER — TELEMEDICINE (OUTPATIENT)
Dept: FAMILY MEDICINE CLINIC | Facility: CLINIC | Age: 73
End: 2023-12-26
Payer: MEDICARE

## 2023-12-26 DIAGNOSIS — J06.9 VIRAL UPPER RESPIRATORY TRACT INFECTION: Primary | ICD-10-CM

## 2023-12-26 PROCEDURE — 99214 OFFICE O/P EST MOD 30 MIN: CPT | Performed by: FAMILY MEDICINE

## 2023-12-26 NOTE — PROGRESS NOTES
Virtual Regular Visit    Verification of patient location:    Patient is located at Home in the following state in which I hold an active license [place]      Assessment/Plan:  1. Viral upper respiratory tract infection  Pt appears well with minimal symptoms  Advise ongoing supportive care with rest and plenty of fluids  Minimize contact with wife who is covid (+) - altho she is now at the tail end  Should pt's symptoms worsen - recommend repeat testing and initiating anti-viral if appropriate.  Given history of severe illness in past - would advise ER if symptoms severe .  Avoid over the counter cold medications given pt's h/o Qtc prolongation      Problem List Items Addressed This Visit    None           Reason for visit is   Chief Complaint   Patient presents with    Virtual Regular Visit          Encounter provider Shauna Figueroa DO    Provider located at [place] [address] [facility] [address]  [facility] [address] [phone]      Recent Visits  No visits were found meeting these conditions.  Showing recent visits within past 7 days and meeting all other requirements  Today's Visits  Date Type Provider Dept   12/26/23 Telemedicine Shauna Figueroa DO [facility]   Showing today's visits and meeting all other requirements  Future Appointments  No visits were found meeting these conditions.  Showing future appointments within next 150 days and meeting all other requirements       The patient was identified by name and date of birth. Murphy Willams was informed that this is a telemedicine visit and that the visit is being conducted through the [employer] platform. He agrees to proceed..  My office door was closed. No one else was in the room.  He acknowledged consent and understanding of privacy and security of the video platform. The patient has agreed to participate and understands they can discontinue the visit at any time.    Patient  is aware this is a billable service.     Subjective  Murphy Willams is a 73 y.o. male           HPI   Pt presents today for acute visit  Started not feeling well X 3 days ago  Does not feel that bad overall  Pt with h/o cardiac arrest and prolonged Qtc interval.  His wife was just dx with covid today but with very minimal symptoms.  Going to [employer]  Using the [employer] pot and was taking plain mucinex - but stopped taking due to not needing          Past Medical History:   Diagnosis Date    Anxiety     BPH (benign prostatic hypertrophy)     Cancer ([facility])     CLL    CLL (chronic lymphocytic leukemia) ([facility])     2009    Colon polyp     Concussion     Resolved: 08/22/16    Depression     Diverticulitis 01/13/2020 2014 CT done 11/19 12/19 CT done     E coli bacteremia 06/27/2021 2/2 blood cultures with Ecoli  Source appears to be the urine     Epididymitis 05/19/2021 5/2021    Gastrointestinal hemorrhage     Last assessed: 08/27/13    GERD (gastroesophageal reflux disease)     H/O vitamin D deficiency 07/07/2021    Hearing loss of aging     Hiatal hernia     History of right hip replacement 04/19/2021    History of transfusion     Hyperlipidemia     Hypertension     Hyponatremia 03/06/2023    Iron deficiency anemia     Microscopic hematuria     Last assessed: 06/28/13    OA (osteoarthritis)     right hip    Obesity (BMI 30.0-34.9) 04/07/2022    Pneumothorax     Primary osteoarthritis of right hip 09/29/2017    He is status post right hip arthroplasty    Prostatitis     Pulmonary hypertension (HCC)     QT prolongation     Seasonal allergies     Sepsis ([facility]) 03/06/2023    Unresponsive episode 03/07/2023    Urinary tract infection associated with catheterization of urinary tract  02/05/2021    Pseudomonal UTI asymptomatic.  Per Urology do not treat at this time.    Urinary tract infection associated with catheterization of urinary tract  02/05/2021    Pseudomonal UTI asymptomatic.  Per Urology do not treat at  this time.  He did have urosepsis from E coli 7/21    UTI (urinary tract infection)     in past    Wears glasses        Past Surgical History:   Procedure Laterality Date    BLADDER SURGERY      CARDIAC CATHETERIZATION Left 3/7/2023    Procedure: Cardiac Left Heart Cath;  Surgeon: Rich Ramirez MD;  Location: [facility];  Service: Cardiology    CARDIAC CATHETERIZATION N/A 3/7/2023    Procedure: Cardiac temporary pacemaker;  Surgeon: Rich Ramirez MD;  Location: [facility];  Service: Cardiology    CARDIAC ELECTROPHYSIOLOGY PROCEDURE N/A 3/8/2023    Procedure: Cardiac icd implant;  Surgeon: Filiberto Olsen MD;  Location: [facility];  Service: Cardiology    COLONOSCOPY      CYSTOSCOPY  10/09/2014    Diagnostic    CYSTOSCOPY  06/29/2020    FL CYSTOGRAM  08/15/2022    FRACTURE SURGERY      left lower arm    FRACTURE SURGERY      left femur    HERNIA REPAIR      JOINT REPLACEMENT Right     hip    OTHER SURGICAL HISTORY  03/2011    Spinal anesthesia epidural    NE ARTHRP ACETBLR/PROX FEM PROSTC AGRFT/ALGRFT Right 09/29/2017    Procedure: ARTHROPLASTY HIP TOTAL ANTERIOR;  Surgeon: Roly Liu MD;  Location: [facility];  Service: Orthopedics    TONSILLECTOMY AND ADENOIDECTOMY      TRANSURETHRAL RESECTION OF PROSTATE      x 2    WRIST SURGERY         Current Outpatient Medications   Medication Sig Dispense Refill    Acetaminophen 500 MG Take 1,000 mg by mouth prn      ascorbic acid (VITAMIN C) 250 MG CHEW Chew 250 mg daily      atorvastatin (LIPITOR) 20 mg tablet TAKE ONE TABLET BY MOUTH ONCE EVERY DAY 90 tablet 3    Blood Pressure Monitoring (Omron 5 Series BP Monitor) PEE       busPIRone (BUSPAR) 7.5 mg tablet 1 tablet in the morning and 1 at night 180 tablet 3    dabigatran etexilate (PRADAXA) 150 mg capsu Take 1 capsule (150 mg total) by mouth 2 (two) times a day 180 capsule 1    dabigatran etexilate (PRADAXA) 150 mg capsu Take 1 capsule (150 mg total) by mouth 2 (two) times a day 8 capsule 0     docusate sodium (COLACE) 100 mg capsule Take 1 capsule (100 mg total) by mouth 2 (two) times a day 60 capsule 0    Lactobacillus (PROBIOTIC ACIDOPHILUS PO) Take by mouth      LORazepam (Ativan) 0.5 mg tablet Take 1 tablet (0.5 mg total) by mouth every 8 (eight) hours as needed for anxiety 60 tablet 1    losartan (COZAAR) 50 mg tablet Take 1 tablet (50 mg total) by mouth daily 90 tablet 1    montelukast (SINGULAIR) 10 mg tablet TAKE ONE TABLET BY MOUTH DAILY 90 tablet 3    nadolol (CORGARD) 80 MG tablet Take 1 tablet (80 mg total) by mouth daily in the early morning 90 tablet 3    Omega-3 Fatty Acids (FISH OIL) 1,000 mg Take by mouth daily      omeprazole (PriLOSEC) 40 MG capsule Take one capsule by mouth once every day. 90 capsule 3    spironolactone (ALDACTONE) 25 mg tablet Take 1 tablet (25 mg total) by mouth daily 90 tablet 1    triamcinolone (KENALOG) 0.1 % ointment Apply twice daily to spot on clavicle until next visit. May stop if resolved prior to that time 15 g 0    vitamin B-12 (CYANOCOBALAMIN) 500 MCG TABS Take 1 tablet (500 mcg total) by mouth daily 30 tablet 0    zolpidem (AMBIEN) 5 mg tablet Take 1/2-1 full tablet at bedtime as needed for sleep.  Do not take if drinking any alcohol or taking lorazepam 30 tablet 0     No current facility-administered medications for this visit.        Allergies   Allergen Reactions    Ciprofloxacin Other (See Comments)     LONG QT syndrome    Codeine Other (See Comments)     agitated    Sulfamethoxazole-Trimethoprim GI Intolerance, Abdominal Pain and Other (See Comments)     upset stomach       Review of Systems   Constitutional:  Negative for chills, diaphoresis, fatigue and fever.        Has been exercising 3x/week with a      HENT:  Positive for rhinorrhea. Negative for congestion, sinus pressure, sinus pain and sore throat.         Slight runny nose     Eyes:         Eyes watery     Respiratory:  Negative for cough, shortness of breath and wheezing.     Cardiovascular:  Negative for chest pain.   Gastrointestinal:  Negative for abdominal pain, diarrhea, nausea and vomiting.   Genitourinary:         Pt is drinking plenty of fluids.         Video Exam    There were no vitals filed for this visit.    Physical Exam  Constitutional:       General: He is not in acute distress.     Appearance: He is not ill-appearing or toxic-appearing.      Comments: Pt looks good on video  No distress  Does not sound congested  Is not coughing   Pulmonary:      Effort: Pulmonary effort is normal. No respiratory distress.      Comments: Conversing normally without shortness of breath or coughing        Skin:     Coloration: Skin is not jaundiced.      Comments: Good facial coloring     Neurological:      Mental Status: He is alert.   Psychiatric:         Mood and Affect: Mood normal.         Behavior: Behavior normal.         Thought Content: Thought content normal.         Judgment: Judgment normal.          Visit Time  Total Visit Duration: 15 min

## 2023-12-28 ENCOUNTER — TELEPHONE (OUTPATIENT)
Dept: FAMILY MEDICINE CLINIC | Facility: CLINIC | Age: 73
End: 2023-12-28

## 2023-12-28 NOTE — TELEPHONE ENCOUNTER
Pt called reproting anxiety, burning/running nose and fatigue. His wife had tested positive a few days ago and he had been worried and fearing that he may catch something again whereas he tested negative a few days ago. I told him to take deep breaths and relax a little to help ease his anxiety. After speaking with PCP he said it was okay for him to come in tomorrow at the end of the day. Pt scheduled.

## 2023-12-29 ENCOUNTER — OFFICE VISIT (OUTPATIENT)
Dept: FAMILY MEDICINE CLINIC | Facility: CLINIC | Age: 73
End: 2023-12-29
Payer: MEDICARE

## 2023-12-29 VITALS
OXYGEN SATURATION: 98 % | RESPIRATION RATE: 12 BRPM | WEIGHT: 198.4 LBS | HEIGHT: 68 IN | HEART RATE: 67 BPM | SYSTOLIC BLOOD PRESSURE: 132 MMHG | BODY MASS INDEX: 30.07 KG/M2 | DIASTOLIC BLOOD PRESSURE: 84 MMHG

## 2023-12-29 DIAGNOSIS — R09.82 POST-NASAL DRIP: Primary | ICD-10-CM

## 2023-12-29 DIAGNOSIS — F41.8 SITUATIONAL ANXIETY: ICD-10-CM

## 2023-12-29 DIAGNOSIS — C91.10 CLL (CHRONIC LYMPHOCYTIC LEUKEMIA) (HCC): Chronic | ICD-10-CM

## 2023-12-29 DIAGNOSIS — D59.10 AUTOIMMUNE HEMOLYTIC ANEMIA (HCC): ICD-10-CM

## 2023-12-29 DIAGNOSIS — U07.1 COVID-19: ICD-10-CM

## 2023-12-29 LAB
SARS-COV-2 AG UPPER RESP QL IA: POSITIVE
VALID CONTROL: ABNORMAL

## 2023-12-29 PROCEDURE — 87811 SARS-COV-2 COVID19 W/OPTIC: CPT | Performed by: FAMILY MEDICINE

## 2023-12-29 PROCEDURE — 99214 OFFICE O/P EST MOD 30 MIN: CPT | Performed by: FAMILY MEDICINE

## 2023-12-29 RX ORDER — MOLNUPIRAVIR 200 MG/1
800 CAPSULE ORAL EVERY 12 HOURS
Qty: 40 CAPSULE | Refills: 0 | Status: SHIPPED | OUTPATIENT
Start: 2023-12-29 | End: 2023-12-29 | Stop reason: ALTCHOICE

## 2023-12-29 RX ORDER — LORAZEPAM 0.5 MG/1
0.5 TABLET ORAL DAILY PRN
Qty: 60 TABLET | Refills: 1 | Status: SHIPPED | OUTPATIENT
Start: 2023-12-29

## 2023-12-29 RX ORDER — MOLNUPIRAVIR 200 MG/1
800 CAPSULE ORAL EVERY 12 HOURS
Qty: 40 CAPSULE | Refills: 0 | Status: SHIPPED | OUTPATIENT
Start: 2023-12-29 | End: 2024-01-03

## 2023-12-29 NOTE — PROGRESS NOTES
Assessment/Plan:       Problem List Items Addressed This Visit        Other    CLL (chronic lymphocytic leukemia) (HCC) (Chronic)     Continue oncology follow-up         Autoimmune hemolytic anemia (HCC)    COVID-19     Patient has recurrent case of COVID.  His last episode of COVID was disastrous requiring intubation and prolonged hospitalization.  Unfortunately, he has contraindications to Paxlovid and even remdesivir with his prolonged QT.  We called multiple pharmacies and ultimately were able to call in molnupiravir for him.  I did speak to his cardiologist and this seems to be the lower risk modality of treatment.  The patient agrees he would like to be treated at this time.         Relevant Medications    molnupiravir (Lagevrio) 200 mg capsule   Other Visit Diagnoses     Post-nasal drip    -  Primary    Relevant Orders    POCT Rapid Covid Ag (Completed)    Situational anxiety        Relevant Medications    LORazepam (Ativan) 0.5 mg tablet            Subjective:      Patient ID: Murphy Willams is a 73 y.o. male.    Patient presents today after his wife tested positive for COVID.  He tested himself yesterday and was negative.  He has some nasal congestion as well as a mild cough.  He denies excessive shortness of breath.  He denies chest pain or palpitations.  He did have an episode of lightheadedness today while coughing.  He is quite concerned as he became extremely ill with COVID previously having been intubated.    Anxiety  Patient reports no shortness of breath.           The following portions of the patient's history were reviewed and updated as appropriate: allergies, current medications, past family history, past medical history, past social history, past surgical history and problem list.      Current Outpatient Medications:   •  LORazepam (Ativan) 0.5 mg tablet, Take 1 tablet (0.5 mg total) by mouth daily as needed for anxiety, Disp: 60 tablet, Rfl: 1  •  molnupiravir (Lagevrio) 200 mg capsule, Take  4 capsules (800 mg total) by mouth every 12 (twelve) hours for 5 days, Disp: 40 capsule, Rfl: 0  •  Acetaminophen 500 MG, Take 1,000 mg by mouth prn, Disp: , Rfl:   •  ascorbic acid (VITAMIN C) 250 MG CHEW, Chew 250 mg daily, Disp: , Rfl:   •  atorvastatin (LIPITOR) 20 mg tablet, TAKE ONE TABLET BY MOUTH ONCE EVERY DAY, Disp: 90 tablet, Rfl: 3  •  Blood Pressure Monitoring (Omron 5 Series BP Monitor) PEE, , Disp: , Rfl:   •  busPIRone (BUSPAR) 7.5 mg tablet, 1 tablet in the morning and 1 at night, Disp: 180 tablet, Rfl: 3  •  dabigatran etexilate (PRADAXA) 150 mg capsu, Take 1 capsule (150 mg total) by mouth 2 (two) times a day, Disp: 180 capsule, Rfl: 1  •  dabigatran etexilate (PRADAXA) 150 mg capsu, Take 1 capsule (150 mg total) by mouth 2 (two) times a day, Disp: 8 capsule, Rfl: 0  •  docusate sodium (COLACE) 100 mg capsule, Take 1 capsule (100 mg total) by mouth 2 (two) times a day, Disp: 60 capsule, Rfl: 0  •  Lactobacillus (PROBIOTIC ACIDOPHILUS PO), Take by mouth, Disp: , Rfl:   •  losartan (COZAAR) 50 mg tablet, Take 1 tablet (50 mg total) by mouth daily, Disp: 90 tablet, Rfl: 1  •  montelukast (SINGULAIR) 10 mg tablet, TAKE ONE TABLET BY MOUTH DAILY, Disp: 90 tablet, Rfl: 3  •  nadolol (CORGARD) 80 MG tablet, Take 1 tablet (80 mg total) by mouth daily in the early morning, Disp: 90 tablet, Rfl: 3  •  Omega-3 Fatty Acids (FISH OIL) 1,000 mg, Take by mouth daily, Disp: , Rfl:   •  omeprazole (PriLOSEC) 40 MG capsule, Take one capsule by mouth once every day., Disp: 90 capsule, Rfl: 3  •  spironolactone (ALDACTONE) 25 mg tablet, Take 1 tablet (25 mg total) by mouth daily, Disp: 90 tablet, Rfl: 1  •  triamcinolone (KENALOG) 0.1 % ointment, Apply twice daily to spot on clavicle until next visit. May stop if resolved prior to that time, Disp: 15 g, Rfl: 0  •  vitamin B-12 (CYANOCOBALAMIN) 500 MCG TABS, Take 1 tablet (500 mcg total) by mouth daily, Disp: 30 tablet, Rfl: 0  •  zolpidem (AMBIEN) 5 mg tablet, Take  "1/2-1 full tablet at bedtime as needed for sleep.  Do not take if drinking any alcohol or taking lorazepam, Disp: 30 tablet, Rfl: 0     Review of Systems   Constitutional:  Positive for fatigue. Negative for appetite change, chills and fever.   HENT:  Positive for sore throat. Negative for ear pain and postnasal drip.    Respiratory:  Positive for cough. Negative for shortness of breath and wheezing.    Musculoskeletal:  Negative for arthralgias, myalgias and neck pain.         Objective:      /84 (BP Location: Left arm, Patient Position: Sitting, Cuff Size: Standard)   Pulse 67   Resp 12   Ht 5' 8\" (1.727 m)   Wt 90 kg (198 lb 6.4 oz)   SpO2 98%   BMI 30.17 kg/m²          Physical Exam  Vitals reviewed.   Constitutional:       Appearance: He is well-developed. He is obese.   HENT:      Head: Normocephalic.   Cardiovascular:      Rate and Rhythm: Regular rhythm.      Heart sounds: Normal heart sounds. No murmur heard.  Pulmonary:      Effort: No respiratory distress.      Breath sounds: No wheezing or rales.   Abdominal:      General: There is no distension.      Tenderness: There is no abdominal tenderness.   Musculoskeletal:      Right lower leg: No edema.      Left lower leg: No edema.   Skin:     Findings: No erythema or rash.   Neurological:      Mental Status: He is alert and oriented to person, place, and time.   Psychiatric:         Mood and Affect: Mood normal.           Pravin Ortega Jr, MD    "

## 2023-12-29 NOTE — ASSESSMENT & PLAN NOTE
Patient has recurrent case of COVID.  His last episode of COVID was disastrous requiring intubation and prolonged hospitalization.  Unfortunately, he has contraindications to Paxlovid and even remdesivir with his prolonged QT.  We called multiple pharmacies and ultimately were able to call in molnupiravir for him.  I did speak to his cardiologist and this seems to be the lower risk modality of treatment.  The patient agrees he would like to be treated at this time.

## 2024-01-03 ENCOUNTER — TELEPHONE (OUTPATIENT)
Dept: CARDIOLOGY CLINIC | Facility: CLINIC | Age: 74
End: 2024-01-03

## 2024-01-03 DIAGNOSIS — I48.0 PAROXYSMAL A-FIB (HCC): ICD-10-CM

## 2024-01-03 RX ORDER — DABIGATRAN ETEXILATE 150 MG/1
150 CAPSULE ORAL 2 TIMES DAILY
Qty: 180 CAPSULE | Refills: 2 | Status: SHIPPED | OUTPATIENT
Start: 2024-01-03

## 2024-01-03 NOTE — TELEPHONE ENCOUNTER
. I would like a nurse to call me in regards to my Pradaxa, the generic dabigatran 150 milligrams and also I'm gonna need renewals, but it's too complicated to explain what I would like done. So I would appreciate a phone call at [phone] or if you can't reach me there, please call my cell at [phone]. Thank you.  You received a voice mail from [facility].

## 2024-01-09 ENCOUNTER — OFFICE VISIT (OUTPATIENT)
Dept: DERMATOLOGY | Facility: CLINIC | Age: 74
End: 2024-01-09
Payer: MEDICARE

## 2024-01-09 VITALS — WEIGHT: 199 LBS | BODY MASS INDEX: 30.26 KG/M2 | TEMPERATURE: 98.4 F

## 2024-01-09 DIAGNOSIS — D48.5 NEOPLASM OF UNCERTAIN BEHAVIOR OF SKIN: Primary | ICD-10-CM

## 2024-01-09 DIAGNOSIS — F32.A ANXIETY AND DEPRESSION: ICD-10-CM

## 2024-01-09 DIAGNOSIS — F41.9 ANXIETY AND DEPRESSION: ICD-10-CM

## 2024-01-09 PROCEDURE — 11102 TANGNTL BX SKIN SINGLE LES: CPT | Performed by: STUDENT IN AN ORGANIZED HEALTH CARE EDUCATION/TRAINING PROGRAM

## 2024-01-09 PROCEDURE — 99214 OFFICE O/P EST MOD 30 MIN: CPT | Performed by: STUDENT IN AN ORGANIZED HEALTH CARE EDUCATION/TRAINING PROGRAM

## 2024-01-09 PROCEDURE — 88305 TISSUE EXAM BY PATHOLOGIST: CPT | Performed by: STUDENT IN AN ORGANIZED HEALTH CARE EDUCATION/TRAINING PROGRAM

## 2024-01-09 NOTE — PROGRESS NOTES
"[facility]atology [facility] Note     Patient Name: Murphy Willams  Encounter Date: 1/9/2024     Have you been cared for by a [facility] Dermatologist in the last 3 years and, if so, which description applies to you?    Yes.  I have been here within the last 3 years, and my medical history has NOT changed since that time.  I am MALE/not capable of bearing children.    REVIEW OF SYSTEMS:  Have you recently had or currently have any of the following? No changes in my recent health.   PAST MEDICAL HISTORY:  Have you personally ever had or currently have any of the following?  If \"YES,\" then please provide more detail. No changes in my medical history.   HISTORY OF IMMUNOSUPPRESSION: Do you have a history of any of the following:  Systemic Immunosuppression such as Diabetes, Biologic or Immunotherapy, Chemotherapy, Organ Transplantation, Bone Marrow Transplantation?  No     Answering \"YES\" requires the addition of the dotphrase \"IMMUNOSUPPRESSED\" as the first diagnosis of the patient's visit.   FAMILY HISTORY:  Any \"first degree relatives\" (parent, brother, sister, or child) with the following?    No changes in my family's known health.   PATIENT EXPERIENCE:    Do you want the Dermatologist to perform a COMPLETE skin exam today including a clinical examination under the \"bra and underwear\" areas?  NO  If necessary, do we have your permission to call and leave a detailed message on your Preferred Phone number that includes your specific medical information?  Yes      Allergies   Allergen Reactions    Ciprofloxacin Other (See Comments)     LONG QT syndrome    Codeine Other (See Comments)     agitated    Sulfamethoxazole-Trimethoprim GI Intolerance, Abdominal Pain and Other (See Comments)     upset stomach      Current Outpatient Medications:     Acetaminophen 500 MG, Take 1,000 mg by mouth prn, Disp: , Rfl:     ascorbic acid (VITAMIN C) 250 MG CHEW, Chew 250 mg daily, Disp: , Rfl:     atorvastatin (LIPITOR) 20 mg tablet, " TAKE ONE TABLET BY MOUTH ONCE EVERY DAY, Disp: 90 tablet, Rfl: 3    Blood Pressure Monitoring (Omron 5 Series BP Monitor) PEE, , Disp: , Rfl:     busPIRone (BUSPAR) 7.5 mg tablet, 1 tablet in the morning and 1 at night, Disp: 180 tablet, Rfl: 3    dabigatran etexilate (PRADAXA) 150 mg capsu, Take 1 capsule (150 mg total) by mouth 2 (two) times a day, Disp: 8 capsule, Rfl: 0    dabigatran etexilate (PRADAXA) 150 mg capsu, Take 1 capsule (150 mg total) by mouth 2 (two) times a day, Disp: 180 capsule, Rfl: 2    docusate sodium (COLACE) 100 mg capsule, Take 1 capsule (100 mg total) by mouth 2 (two) times a day, Disp: 60 capsule, Rfl: 0    Lactobacillus (PROBIOTIC ACIDOPHILUS PO), Take by mouth, Disp: , Rfl:     LORazepam (Ativan) 0.5 mg tablet, Take 1 tablet (0.5 mg total) by mouth daily as needed for anxiety, Disp: 60 tablet, Rfl: 1    losartan (COZAAR) 50 mg tablet, Take 1 tablet (50 mg total) by mouth daily, Disp: 90 tablet, Rfl: 1    montelukast (SINGULAIR) 10 mg tablet, TAKE ONE TABLET BY MOUTH DAILY, Disp: 90 tablet, Rfl: 3    nadolol (CORGARD) 80 MG tablet, Take 1 tablet (80 mg total) by mouth daily in the early morning, Disp: 90 tablet, Rfl: 3    Omega-3 Fatty Acids (FISH OIL) 1,000 mg, Take by mouth daily, Disp: , Rfl:     omeprazole (PriLOSEC) 40 MG capsule, Take one capsule by mouth once every day., Disp: 90 capsule, Rfl: 3    spironolactone (ALDACTONE) 25 mg tablet, Take 1 tablet (25 mg total) by mouth daily, Disp: 90 tablet, Rfl: 1    triamcinolone (KENALOG) 0.1 % ointment, Apply twice daily to spot on clavicle until next visit. May stop if resolved prior to that time, Disp: 15 g, Rfl: 0    vitamin B-12 (CYANOCOBALAMIN) 500 MCG TABS, Take 1 tablet (500 mcg total) by mouth daily, Disp: 30 tablet, Rfl: 0    zolpidem (AMBIEN) 5 mg tablet, Take 1/2-1 full tablet at bedtime as needed for sleep.  Do not take if drinking any alcohol or taking lorazepam, Disp: 30 tablet, Rfl: 0        Whom besides the patient is  "providing clinical information about today's encounter?   NO ADDITIONAL HISTORIAN (patient alone provided history)    Physical Exam and Assessment/Plan by Diagnosis:    NEOPLASM OF UNCERTAIN BEHAVIOR OF SKIN    Physical Exam:  (Anatomic Location); (Size and Morphological Description); (Differential Diagnosis):  Left clavicle; 1.0 cm x 0.5 cm scaly plaque; ddx: trauma vs SCC vs AK          Additional History of Present Condition: Patient here on 11/22/2023 for complaint of non-specific NUB on left clavicle. He was given Triamcinolone. He was instructed to follow up to assess for improvement and will likely need a biopsy if little to no improvement found. Today, he has very little improvement.     Assessment and Plan:  I have discussed with the patient that a sample of skin via a \"skin biopsy” would be potentially helpful to further make a specific diagnosis under the microscope.  Based on a thorough discussion of this condition and the management approach to it (including a comprehensive discussion of the known risks, side effects and potential benefits of treatment), the patient (family) agrees to implement the following specific plan:     Procedure:  Skin Biopsy.  After a thorough discussion of treatment options and risk/benefits/alternatives (including but not limited to local pain, scarring, dyspigmentation, blistering, possible superinfection, and inability to confirm a diagnosis via histopathology), verbal and written consent were obtained and portion of the rash was biopsied for tissue sample.  See below for consent that was obtained from patient and subsequent Procedure Note.    PROCEDURE TANGENTIAL (SHAVE) BIOPSY NOTE:    Performing Physician: MATHEW Thornton   Anatomic Location; Clinical Description with size (cm); Pre-Op Diagnosis:   SPECIMEN A: Left clavicle; 1.0 cm x 0.5 cm scaly plaque; ddx trauma vs SCC vs AK    Post-op diagnosis: Same     Local anesthesia: 1% xylocaine with epi      Topical " "anesthesia: None    Hemostasis: Aluminum chloride       After obtaining informed consent  at which time there was a discussion about the purpose of biopsy  and low risks of infection and bleeding.  The area was prepped and draped in the usual fashion. Anesthesia was obtained with 1% lidocaine with epinephrine. A shave biopsy to an appropriate sampling depth was obtained by Shave (Dermablade or 15 blade) The resulting wound was covered with surgical ointment and bandaged appropriately.     The patient tolerated the procedure well without complications and was without signs of functional compromise.      Specimen has been sent for review by Dermatopathology.    Standard post-procedure care has been explained and has been included in written form within the patient's copy of Informed Consent.    INFORMED CONSENT DISCUSSION AND POST-OPERATIVE INSTRUCTIONS FOR PATIENT    I.  RATIONALE FOR PROCEDURE  I understand that a skin biopsy allows the Dermatologist to test a lesion or rash under the microscope to obtain a diagnosis.  It usually involves numbing the area with numbing medication and removing a small piece of skin; sometimes the area will be closed with sutures. In this specific procedure, sutures are not usually needed.  If any sutures are placed, then they are usually need to be removed in 2 weeks or less.    I understand that my Dermatologist recommends that a skin \"shave\" biopsy be performed today.  A local anesthetic, similar to the kind that a dentist uses when filling a cavity, will be injected with a very small needle into the skin area to be sampled.  The injected skin and tissue underneath \"will go to sleep” and become numb so no pain should be felt afterwards.  An instrument shaped like a tiny \"razor blade\" (shave biopsy instrument) will be used to cut a small piece of tissue and skin from the area so that a sample of tissue can be taken and examined more closely under the microscope.  A slight amount of " "bleeding will occur, but it will be stopped with direct pressure and a pressure bandage and any other appropriate methods.  I understands that a scar will form where the wound was created.  Surgical ointment will be applied to help protect the wound.  Sutures are not usually needed.    II.  RISKS AND POTENTIAL COMPLICATIONS   I understand the risks and potential complications of a skin biopsy include but are not limited to the following:  Bleeding  Infection  Pain  Scar/keloid  Skin discoloration  Incomplete Removal  Recurrence  Nerve Damage/Numbness/Loss of Function  Allergic Reaction to Anesthesia  Biopsies are diagnostic procedures and based on findings additional treatment or evaluation may be required  Loss or destruction of specimen resulting in no additional findings    My Dermatologist has explained to me the nature of the condition, the nature of the procedure, and the benefits to be reasonably expected compared with alternative approaches.  My Dermatologist has discussed the likelihood of major risks or complications of this procedure including the specific risks listed above, such as bleeding, infection, and scarring/keloid.  I understand that a scar is expected after this procedure.  I understand that my physician cannot predict if the scar will form a \"keloid,\" which extends beyond the borders of the wound that is created.  A keloid is a thick, painful, and bumpy scar.  A keloid can be difficult to treat, as it does not always respond well to therapy, which includes injecting cortisone directly into the keloid every few weeks.  While this usually reduces the pain and size of the scar, it does not eliminate it.      I understand that photographs may be taken before and after the procedure.  These will be maintained as part of the medical providers confidential records and may not be made available to me.  I further authorize the medical provider to use the photographs for teaching purposes or to " "illustrate scientific papers, books, or lectures if in his/her judgment, medical research, education, or science may benefit from its use.    I have had an opportunity to fully inquire about the risks and benefits of this procedure and its alternatives.   I have been given ample time and opportunity to ask questions and to seek a second opinion if I wished to do so.  I acknowledge that there have specifically been no guarantees as to the cosmetic results from the procedure.  I am aware that with any procedure there is always the possibility of an unexpected complication.    III. POST-PROCEDURAL CARE (WHAT YOU WILL NEED TO DO \"AFTER THE BIOPSY\" TO OPTIMIZE HEALING)    Keep the area clean and dry.  Try NOT to remove the bandage or get it wet for the first 24 hours.    Gently clean the area and apply surgical ointment (such as Vaseline petrolatum ointment, which is available \"over the counter\" and not a prescription) to the biopsy site for up to 2 weeks straight.  This acts to protect the wound from the outside world.      Sutures are not usually placed in this procedure.  If any sutures were placed, return for suture removal as instructed (generally 1 week for the face, 2 weeks for the body).      Take Acetaminophen (Tylenol) for discomfort, if no contraindications.  Ibuprofen or aspirin could make bleeding worse.    Call our office immediately for signs of infection: fever, chills, increased redness, warmth, tenderness, discomfort/pain, or pus or foul smell coming from the wound.    WHAT TO DO IF THERE IS ANY BLEEDING?  If a small amount of bleeding is noticed, place a clean cloth over the area and apply firm pressure for ten minutes.  Check the wound after 10 minutes of direct pressure.  If bleeding persists, try one more time for an additional 10 minutes of direct pressure on the area.  If the bleeding becomes heavier or does not stop after the second attempt, or if you have any other questions about this " procedure, then please call your [facility] Dermatologist by calling [phone] (SKIN).     I hereby acknowledge that I have reviewed and verified the site with my Dermatologist and have requested and authorized my Dermatologist to proceed with the procedure.            Scribe Attestation      I,:  Devorah العلي am acting as a scribe while in the presence of the attending physician.:       I,:  Coleman Hawthorne MD personally performed the services described in this documentation    as scribed in my presence.:             I interviewed, took the history and examined the patient. I discussed the case with Dr. Hawthorne.     Sudhakar Nascimento, MATHEW 01/09/24

## 2024-01-09 NOTE — PATIENT INSTRUCTIONS
"NEOPLASM OF UNCERTAIN BEHAVIOR OF SKIN    Assessment and Plan:  I have discussed with the patient that a sample of skin via a \"skin biopsy” would be potentially helpful to further make a specific diagnosis under the microscope.  Based on a thorough discussion of this condition and the management approach to it (including a comprehensive discussion of the known risks, side effects and potential benefits of treatment), the patient (family) agrees to implement the following specific plan:    Procedure:  Skin Biopsy.  After a thorough discussion of treatment options and risk/benefits/alternatives (including but not limited to local pain, scarring, dyspigmentation, blistering, possible superinfection, and inability to confirm a diagnosis via histopathology), verbal and written consent were obtained and portion of the rash was biopsied for tissue sample.  See below for consent that was obtained from patient and subsequent Procedure Note.    PROCEDURE TANGENTIAL (SHAVE) BIOPSY NOTE:    After obtaining informed consent  at which time there was a discussion about the purpose of biopsy  and low risks of infection and bleeding.  The area was prepped and draped in the usual fashion. Anesthesia was obtained with 1% lidocaine with epinephrine. A shave biopsy to an appropriate sampling depth was obtained by Shave (Dermablade or 15 blade) The resulting wound was covered with surgical ointment and bandaged appropriately.     The patient tolerated the procedure well without complications and was without signs of functional compromise.      Specimen has been sent for review by Dermatopathology.    Standard post-procedure care has been explained and has been included in written form within the patient's copy of Informed Consent.    INFORMED CONSENT DISCUSSION AND POST-OPERATIVE INSTRUCTIONS FOR PATIENT    I.  RATIONALE FOR PROCEDURE  I understand that a skin biopsy allows the Dermatologist to test a lesion or rash under the microscope " "to obtain a diagnosis.  It usually involves numbing the area with numbing medication and removing a small piece of skin; sometimes the area will be closed with sutures. In this specific procedure, sutures are not usually needed.  If any sutures are placed, then they are usually need to be removed in 2 weeks or less.    I understand that my Dermatologist recommends that a skin \"shave\" biopsy be performed today.  A local anesthetic, similar to the kind that a dentist uses when filling a cavity, will be injected with a very small needle into the skin area to be sampled.  The injected skin and tissue underneath \"will go to sleep” and become numb so no pain should be felt afterwards.  An instrument shaped like a tiny \"razor blade\" (shave biopsy instrument) will be used to cut a small piece of tissue and skin from the area so that a sample of tissue can be taken and examined more closely under the microscope.  A slight amount of bleeding will occur, but it will be stopped with direct pressure and a pressure bandage and any other appropriate methods.  I understands that a scar will form where the wound was created.  Surgical ointment will be applied to help protect the wound.  Sutures are not usually needed.    II.  RISKS AND POTENTIAL COMPLICATIONS   I understand the risks and potential complications of a skin biopsy include but are not limited to the following:  Bleeding  Infection  Pain  Scar/keloid  Skin discoloration  Incomplete Removal  Recurrence  Nerve Damage/Numbness/Loss of Function  Allergic Reaction to Anesthesia  Biopsies are diagnostic procedures and based on findings additional treatment or evaluation may be required  Loss or destruction of specimen resulting in no additional findings    My Dermatologist has explained to me the nature of the condition, the nature of the procedure, and the benefits to be reasonably expected compared with alternative approaches.  My Dermatologist has discussed the likelihood " "of major risks or complications of this procedure including the specific risks listed above, such as bleeding, infection, and scarring/keloid.  I understand that a scar is expected after this procedure.  I understand that my physician cannot predict if the scar will form a \"keloid,\" which extends beyond the borders of the wound that is created.  A keloid is a thick, painful, and bumpy scar.  A keloid can be difficult to treat, as it does not always respond well to therapy, which includes injecting cortisone directly into the keloid every few weeks.  While this usually reduces the pain and size of the scar, it does not eliminate it.      I understand that photographs may be taken before and after the procedure.  These will be maintained as part of the medical providers confidential records and may not be made available to me.  I further authorize the medical provider to use the photographs for teaching purposes or to illustrate scientific papers, books, or lectures if in his/her judgment, medical research, education, or science may benefit from its use.    I have had an opportunity to fully inquire about the risks and benefits of this procedure and its alternatives.   I have been given ample time and opportunity to ask questions and to seek a second opinion if I wished to do so.  I acknowledge that there have specifically been no guarantees as to the cosmetic results from the procedure.  I am aware that with any procedure there is always the possibility of an unexpected complication.    III. POST-PROCEDURAL CARE (WHAT YOU WILL NEED TO DO \"AFTER THE BIOPSY\" TO OPTIMIZE HEALING)    Keep the area clean and dry.  Try NOT to remove the bandage or get it wet for the first 24 hours.    Gently clean the area and apply surgical ointment (such as Vaseline petrolatum ointment, which is available \"over the counter\" and not a prescription) to the biopsy site for up to 2 weeks straight.  This acts to protect the wound from the " outside world.      Sutures are not usually placed in this procedure.  If any sutures were placed, return for suture removal as instructed (generally 1 week for the face, 2 weeks for the body).      Take Acetaminophen (Tylenol) for discomfort, if no contraindications.  Ibuprofen or aspirin could make bleeding worse.    Call our office immediately for signs of infection: fever, chills, increased redness, warmth, tenderness, discomfort/pain, or pus or foul smell coming from the wound.    WHAT TO DO IF THERE IS ANY BLEEDING?  If a small amount of bleeding is noticed, place a clean cloth over the area and apply firm pressure for ten minutes.  Check the wound after 10 minutes of direct pressure.  If bleeding persists, try one more time for an additional 10 minutes of direct pressure on the area.  If the bleeding becomes heavier or does not stop after the second attempt, or if you have any other questions about this procedure, then please call your [facility]'s Dermatologist by calling [phone] (SKIN).     I hereby acknowledge that I have reviewed and verified the site with my Dermatologist and have requested and authorized my Dermatologist to proceed with the procedure.

## 2024-01-12 ENCOUNTER — TELEPHONE (OUTPATIENT)
Age: 74
End: 2024-01-12

## 2024-01-12 RX ORDER — BUSPIRONE HYDROCHLORIDE 7.5 MG/1
TABLET ORAL
Qty: 180 TABLET | Refills: 0 | Status: SHIPPED | OUTPATIENT
Start: 2024-01-12

## 2024-01-13 PROCEDURE — 88305 TISSUE EXAM BY PATHOLOGIST: CPT | Performed by: STUDENT IN AN ORGANIZED HEALTH CARE EDUCATION/TRAINING PROGRAM

## 2024-01-15 ENCOUNTER — TELEPHONE (OUTPATIENT)
Age: 74
End: 2024-01-15

## 2024-01-15 NOTE — TELEPHONE ENCOUNTER
1/15 12:15pm: Per PT: the Buspar medication not really helping me so I’m going to wean myself off of it. I’m going to take half a pill a day for 5 days and then stop. I will also let him know this when I see him on 2/23/24.

## 2024-01-15 NOTE — TELEPHONE ENCOUNTER
Please call patient to go over biopsy results. He has received them via my chart and is wondering whether or not he will need MOHS. Patient was advised that this would be up to the provider, patient confirmed that the contact numbers on file are correct. If he can not be reache don his cell, he ask that we try his home number.

## 2024-01-16 ENCOUNTER — TELEPHONE (OUTPATIENT)
Dept: PULMONOLOGY | Facility: CLINIC | Age: 74
End: 2024-01-16

## 2024-01-16 NOTE — RESULT ENCOUNTER NOTE
DERMATOPATHOLOGY RESULT NOTE    Results reviewed by ordering physician.  Discussed results with patient:     A. Skin, left clavicle, shave biopsy:    At least SQUAMOUS CELL CARCINOMA IN SITU; transected.    Instructions for Clinical Derm Team:   (remember to route Result Note to appropriate staff):    Call patient and schedule for EXCISION IN [place] WITH DR. MAGALLON.     Result & Plan by Specimen:    Specimen A: malignant  Plan: excision     [id number]  Order: [id number]  Status: Final result      Visible to patient: Yes (seen)      Dx: Neoplasm of uncertain behavior of skin    0 Result Notes     Component   Case Report  Surgical Pathology Report                         Case: [id number]                                  Authorizing Provider:  MATHEW Morrison Collected:           01/09/2024 1159              Ordering Location:     [facility]      Received:            01/09/2024 [address] [place] [address]                                                                      Pathologist:           Franca Kamara MD                                                          Specimen:    Skin, Other, Specimen A: left clavicle                                                  Final Diagnosis  A. Skin, left clavicle, shave biopsy:    At least SQUAMOUS CELL CARCINOMA IN SITU; transected.      Electronically signed by Franca Kamara MD on 1/13/2024 at 12:53 PM  Additional Information   All reported additional testing was performed with appropriately reactive controls.  These tests were developed and their performance characteristics determined by [facility] or appropriate performing facility, though some tests may be performed on tissues which have not been validated for performance characteristics (such as staining performed on alcohol exposed cell blocks and decalcified tissues).  Results should be interpreted with caution and in the context of the patients'  "clinical condition. These tests may not be cleared or approved by the U.S. Food and Drug Administration, though the FDA has determined that such clearance or approval is not necessary. These tests are used for clinical purposes and they should not be regarded as investigational or for research. This laboratory has been approved by CLIA 88, designated as a high-complexity laboratory and is qualified to perform these tests.  .  Gross Description   A. The specimen is received in formalin, labeled with the patient's name and hospital number, and is designated \" skin left clavicle\".  It consists of a 0.7 x 0.6 x 0.1 cm skin shave.  The epidermis displays a 0.6 x 0.6 cm tan-pink scaly plaque at the skin margin.  The resection margin is inked green and the specimen is bisected.  Entirely submitted between sponges in cassette A1.    Note: The estimated total formalin fixation time based upon information provided by the submitting clinician and the standard processing schedule is under 72 hours.  [facility]  Clinical Information   ATTENTION:  DERMPATH GROUP    SPECIMEN A; 73 y.o. year old male; Skin; Shave biopsy; Left clavicle; 1.0 cm x 0.5 cm scaly plaque  Differential Diagnosis: Trauma vs Squamous cell carcinoma vs Actinic Keratosis  Resulting Agency [facility]    Specimen Collected: 01/09/24 11:59 AM Last Resulted: 01/13/24 12:53 PM            "

## 2024-01-16 NOTE — TELEPHONE ENCOUNTER
Dr. Lore melendrez on 3/14, [facility] for pt to call back and reschedule f/u

## 2024-01-19 ENCOUNTER — TELEPHONE (OUTPATIENT)
Age: 74
End: 2024-01-19

## 2024-01-19 NOTE — TELEPHONE ENCOUNTER
My templates are incorrect and I have no procedure days (since November until March). Management is aware but the appointment slots were filled with general medical dermatology appointments so were not rescheduled. This should ideally be addressed prior to that point since we were monitoring the lesion prior to biopsy. He will have to be placed in another providers clinic given that I am without any procedure days until March. Thank you for checking

## 2024-01-19 NOTE — TELEPHONE ENCOUNTER
Pt is disgusted with the network and would like someone to call him back right away regarding his excision. He was told by maurisio someone would call him back by the end of the week and he has not heard anything. Please call pt back

## 2024-01-19 NOTE — TELEPHONE ENCOUNTER
A. Skin, left clavicle, shave biopsy:  At least SQUAMOUS CELL CARCINOMA IN SITU; transected.    PLAN: excision     Please schedule patient for excision with Dr. Hawthorne in [place].

## 2024-01-19 NOTE — TELEPHONE ENCOUNTER
Call had dropped as soon as I pulled his chart to verify his identifiers (due to [employer] down tome at 10:30am for 5min). I called back but not able to connect call.    He was not reached as of yet to schedule procedure for [facility].     Dr. Hawthorne you don't have any procedure slots. Patient is frustrated that no one called him back to schedule. Please advise with who we can schedule.    Thank you.

## 2024-01-19 NOTE — TELEPHONE ENCOUNTER
Called and spoke with pt at length. He really preferred to have the procedure done with Dr. Hawthorne. Made him aware the procedure slots with Dr. Hawthorne was not available. Patient scheduled with dr. Yang for 2/7 in [place]. Also added him to wait list and provided him with office number to call and check prior in case something opens up with Dr. Hawthorne in [place]

## 2024-01-19 NOTE — TELEPHONE ENCOUNTER
Received call from patient his is looking to speak with management in regards to this matter.     He states he wants to stat with Dr Hawthorne and is disgusted with this situation    Email was sent to Dimple asking her to call patient

## 2024-01-23 NOTE — TELEPHONE ENCOUNTER
Rec'd call from patient upset that he hasn't yet had response from Dimple - practice manager.    I let patient vent - displeasure with the schedules/system/etc.    He's accepted that Dr. Yang will be doing his procedure and he's okay with it; but he states that since his own doctor couldn't do the procedure due to a template error - that was absurd.    I informed patient that message was sent to Dimple end of day of Friday, Mondays are unfortunately busy with meetings/administrative/staffing issues, etc. I told him that I would send another message for management to return his call.    (He's aware that really nothing can be done regarding schedules and/or lack thereof, he just needs someone to listen. I listened to him and he was very nice and calm.)    Message sent to Dimple to return call to patient.

## 2024-01-30 NOTE — ANESTHESIA POSTPROCEDURE EVALUATION
----- Message from Romy Clement sent at 1/30/2024 10:37 AM CST -----  Contact: pt  Pt is calling rg having issues with her hearing aid and wants to speak with Ms Jaqueline esquivel it and can be reached at  [phone]//thanks/dbw      Post-Op Assessment Note    CV Status:  Stable  Pain Score: 0    Pain management: adequate     Mental Status:  Alert and awake   Hydration Status:  Euvolemic   PONV Controlled:  Controlled   Airway Patency:  Patent      Post Op Vitals Reviewed: Yes      Staff: CRNA         There were no known notable events for this encounter.     BP      Temp     Pulse     Resp      SpO2

## 2024-01-31 DIAGNOSIS — F51.01 PRIMARY INSOMNIA: ICD-10-CM

## 2024-01-31 RX ORDER — ZOLPIDEM TARTRATE 5 MG/1
TABLET ORAL
Qty: 30 TABLET | Refills: 0 | Status: SHIPPED | OUTPATIENT
Start: 2024-01-31

## 2024-02-07 ENCOUNTER — PROCEDURE VISIT (OUTPATIENT)
Dept: DERMATOLOGY | Facility: CLINIC | Age: 74
End: 2024-02-07
Payer: MEDICARE

## 2024-02-07 DIAGNOSIS — D09.9 SQUAMOUS CELL CARCINOMA IN SITU: Primary | ICD-10-CM

## 2024-02-07 PROCEDURE — 12032 INTMD RPR S/A/T/EXT 2.6-7.5: CPT | Performed by: DERMATOLOGY

## 2024-02-07 PROCEDURE — 11602 EXC TR-EXT MAL+MARG 1.1-2 CM: CPT | Performed by: DERMATOLOGY

## 2024-02-07 NOTE — PROGRESS NOTES
"[facility] Dermatology Clinic Note     Patient Name: Murphy Willams  Encounter Date: 2/7/24     Have you been cared for by a [facility] Dermatologist in the last 3 years and, if so, which description applies to you?    Yes.  I have been here within the last 3 years, and my medical history has NOT changed since that time.  I am MALE/not capable of bearing children.    REVIEW OF SYSTEMS:  Have you recently had or currently have any of the following? No changes in my recent health.   PAST MEDICAL HISTORY:  Have you personally ever had or currently have any of the following?  If \"YES,\" then please provide more detail. No changes in my medical history.   HISTORY OF IMMUNOSUPPRESSION: Do you have a history of any of the following:  Systemic Immunosuppression such as Diabetes, Biologic or Immunotherapy, Chemotherapy, Organ Transplantation, Bone Marrow Transplantation?  No     Answering \"YES\" requires the addition of the dotphrase \"IMMUNOSUPPRESSED\" as the first diagnosis of the patient's visit.   FAMILY HISTORY:  Any \"first degree relatives\" (parent, brother, sister, or child) with the following?    No changes in my family's known health.   PATIENT EXPERIENCE:    Do you want the Dermatologist to perform a COMPLETE skin exam today including a clinical examination under the \"bra and underwear\" areas?  NO  If necessary, do we have your permission to call and leave a detailed message on your Preferred Phone number that includes your specific medical information?  Yes      Allergies   Allergen Reactions   • Ciprofloxacin Other (See Comments)     LONG QT syndrome   • Codeine Other (See Comments)     agitated   • Sulfamethoxazole-Trimethoprim GI Intolerance, Abdominal Pain and Other (See Comments)     upset stomach      Current Outpatient Medications:   •  Acetaminophen 500 MG, Take 1,000 mg by mouth prn, Disp: , Rfl:   •  ascorbic acid (VITAMIN C) 250 MG CHEW, Chew 250 mg daily, Disp: , Rfl:   •  atorvastatin (LIPITOR) 20 mg " tablet, TAKE ONE TABLET BY MOUTH ONCE EVERY DAY, Disp: 90 tablet, Rfl: 3  •  Blood Pressure Monitoring (Omron 5 Series BP Monitor) PEE, , Disp: , Rfl:   •  busPIRone (BUSPAR) 7.5 mg tablet, 1 tablet in the morning and 1 at night, Disp: 180 tablet, Rfl: 0  •  dabigatran etexilate (PRADAXA) 150 mg capsu, Take 1 capsule (150 mg total) by mouth 2 (two) times a day, Disp: 8 capsule, Rfl: 0  •  dabigatran etexilate (PRADAXA) 150 mg capsu, Take 1 capsule (150 mg total) by mouth 2 (two) times a day, Disp: 180 capsule, Rfl: 2  •  docusate sodium (COLACE) 100 mg capsule, Take 1 capsule (100 mg total) by mouth 2 (two) times a day, Disp: 60 capsule, Rfl: 0  •  Lactobacillus (PROBIOTIC ACIDOPHILUS PO), Take by mouth, Disp: , Rfl:   •  LORazepam (Ativan) 0.5 mg tablet, Take 1 tablet (0.5 mg total) by mouth daily as needed for anxiety, Disp: 60 tablet, Rfl: 1  •  montelukast (SINGULAIR) 10 mg tablet, TAKE ONE TABLET BY MOUTH DAILY, Disp: 90 tablet, Rfl: 3  •  nadolol (CORGARD) 80 MG tablet, Take 1 tablet (80 mg total) by mouth daily in the early morning, Disp: 90 tablet, Rfl: 3  •  omeprazole (PriLOSEC) 40 MG capsule, Take one capsule by mouth once every day., Disp: 90 capsule, Rfl: 3  •  triamcinolone (KENALOG) 0.1 % ointment, Apply twice daily to spot on clavicle until next visit. May stop if resolved prior to that time, Disp: 15 g, Rfl: 0  •  vitamin B-12 (CYANOCOBALAMIN) 500 MCG TABS, Take 1 tablet (500 mcg total) by mouth daily, Disp: 30 tablet, Rfl: 0  •  zolpidem (AMBIEN) 5 mg tablet, Take 1/2-1 full tablet at bedtime as needed for sleep.  Do not take if drinking any alcohol or taking lorazepam, Disp: 30 tablet, Rfl: 0  •  losartan (COZAAR) 50 mg tablet, Take 1 tablet (50 mg total) by mouth daily, Disp: 90 tablet, Rfl: 1  •  Omega-3 Fatty Acids (FISH OIL) 1,000 mg, Take by mouth daily, Disp: , Rfl:   •  spironolactone (ALDACTONE) 25 mg tablet, Take 1 tablet (25 mg total) by mouth daily, Disp: 90 tablet, Rfl: 1           Whom besides the patient is providing clinical information about today's encounter?   NO ADDITIONAL HISTORIAN (patient alone provided history)    Physical Exam and Assessment/Plan by Diagnosis:      PROCEDURE:  EXCISION WITH INTERMEDIATE LAYERED CLOSURE     Attending:  and Dr King  Assistant:  TARA Batista    Pre-Op Diagnosis: Squamous cell carcinoma in situ  Post-Op Diagnosis: Same       Lesion Anatomic Location: Left clavicle (Previous Accession Number: [id number])  Pre-op size: 0.9 cm x 0.9 cm   Size of defect:  1.5 cm x 1.5 cm (with 0.3 centimeter margins)  Final repaired wound length:  6.2 cm    Written and verbal, witnessed informed consent was obtained. I discussed that excision is a method of removing lesions both benign and malignant lesions.  A portion of normal skin is often taken to ensure completeness of removal.  I reviewed that procedure will include numbing the area,  cutting around and under defect, undermining tissue, and closing the wound with sutures both inside and out.  These sutures are usually removed in 7 to 14 days. Risks (bleeding, pain, infection, scarring, recurrence) and benefits discussed. It was discussed with patient that every effort is made to minimize scar, but scarring is influenced also by extrinsic factor such as location, age and genetics.     Time Out: performed:  yes  Correct patient: yes.   Correct site per Clinic Report: yes  Correct site per Patient Report: yes    LOCAL ANESTHESIA: 3:1 1% xylocaine with epi and 1-100,000 buffered    DESCRIPTION OF PROCEDURE: The patient was brought back into the procedure room, anesthetized locally, prepped and draped in the usual fashion. Using a #15 blade with a scalpel, the lesion was excised in elliptical fashion. The wound was  undermined in the  fascial plane. Hemostasis was achieved with light electrocoagulation. Purpose of undermining was to decrease wound tension and facilitate closure.    The wound was  closed with subcutaneous sutures as follows:    Deep suture:4-0 Vicryl      Epidermal edge closure was accomplished with superficial sutures as follows:    Superficial suture: 4-0 Prolene  Superficial suture type: interrupted    Estimated blood loss less than 3ml.    The patient tolerated the procedure well without any complications. The wound was cleaned with sterile saline, dried off, surgical vaseline ointment was applied, and the wound was covered. A pressure dressing was applied for stabilization and light pressure. The patient was given detailed oral and written instructions on postoperative care as detailed in consent. The patient left in good medical condition.    POSTOP DISCUSSION DISCUSSION AND INSTRUCTIONS FOR PATIENT      Rationale for Procedure  A skin excision allows the dermatologist to remove a lesion. The procedure involves a local numbing medication and removing the entire lesion. Typically, the lesion is being removed because it is atypical, traumatized, or for significant appearance reasons.  The area will be open like a brush burn and allowed to heal.   There will be no sutures.Tissue is sent to pathologist who will reconfirm diagnosis and verify completeness of lesion removal.    Description of Procedure  We would like to perform a skin excision today.  A local anesthetic, similar to the kind that a dentist uses when filling a cavity, will be injected with a very small needle into the skin area to be sampled.  The injected skin and tissue underneath should “go to sleep” and become numbed so that no further pain should be felt.  A scalpel will be used to cut around the lesion and tissue will be submitted to pathology for examination.  Depending on the diagnosis the lesion will be excised with a certain amount of normal skin to help assure completeness of lesion removal.  The physician will discuss in advance the anticipated size and extent of removal.   Bleeding will occur, but it will stopped  with direct pressure, sutures, and electrocautery.    Surgical “Vaseline-type” ointment will also applied after the procedure to help create a barrier between the wound and the outside world.      Risks and Potential Complications  The advantage of a skin excision is that it allows us to remove a problem lesion quickly.  Although this usually permits the lesion to heal as soon as possible with the least scarring, there are some risks and potential complications that include but are not limited to the following:  Some bleeding is normal at time of procedure and some bleeding on gauze is normal  the first few days after surgery.  Profuse bleeding and bleeding with swelling and pain should be reported as detailed  below  Infection is uncommon in skin surgery.  Infection should be reported and is indicated by pain, redness, and discharge of purulent material.  Some dull to at time sharp pain could occur initially the day after surgery.  Persistent pain or increasing pain days after surgery is not expected and should be reported.  Every effort is made to minimize scar, but location, size, and genetics do play a role in scar appearance.  A surgical wound does not achieve its optimal appearance until 6 months.  There are several treatments available if scarring would be problematic including scar creams, silicone pad, laser and scar revision.  Skin discoloration can occur especially in people of color.  Its important to avoid sun on wound in first 6 months after surgery.  Treatment is available if pigment is problematic.  Incomplete removal of the lesion or recurrence of lesion can occur and this would then require further treatment and more surgery.  Nerve Damage/Numbness/Loss of Function is very rare, but is most likely to occur if lesion is large or if it is in a high risk location  Allergic Reaction to lidocaine is rare.  More commonly,  epinephrine is used  with the lidocaine.  Occasionally, epinephrine (aka  adrenalin) may cause a brief  feeling of anxiety or jitteriness.  The person at the microscope  (pathologist) may provide additional information that was unexpected. This unexpected finding could provoke the need for additional treatment or evaluation.    What You Will Need to Do After the Procedure  Keep the area clean and dry the first day. Try NOT to remove the bandage for the first day.  Gently clean the area with soap and water and apply Vaseline ointment (this is over the counter and not a prescription) to the excision  site for up to 2 weeks.    Apply a clean appropriately sized bandage to area.  Gauze and paper tape are recommended for sensitive skin.  Return for suture removal as instructed (generally 1 week for the face, 2 weeks for the body).  Take Acetaminophen (Tylenol) for discomfort, if no contraindications.  Do NOT take Ibuprofen or aspirin unless specifically told to do so by your Dermatologist because these medications can make bleeding worse.  Call our office immediately for signs of infection: fever, chills, increased redness, warmth, tenderness, discomfort/pain, or pus or foul smell coming from the wound.    If bleeding is noticed, place a clean cloth over the area and apply firm pressure for thirty minutes.  Check the wound ONLY after 30 minutes of direct pressure; do not cheat and sneak a peak, as that does not count.  If bleeding persists after 30 minutes of legitimate direct pressure, then try one more round of direct pressure for an additional 10 minutes to the area.  Should the bleeding become heavier or not stop after the second attempt, call [facility] Dermatology directly at [phone] (SKIN) or, if after hours, go to your local Emergency Room/Emergency Department.       Scribe Attestation    I,:  Haydee Mccloud MA am acting as a scribe while in the presence of the attending physician.:       I,:  Minor Yang MD personally performed the services described in this documentation     as scribed in my presence.:

## 2024-02-07 NOTE — PATIENT INSTRUCTIONS
POSTOP DISCUSSION DISCUSSION AND INSTRUCTIONS FOR PATIENT      Rationale for Procedure  A skin excision allows the dermatologist to remove a lesion. The procedure involves a local numbing medication and removing the entire lesion. Typically, the lesion is being removed because it is atypical, traumatized, or for significant appearance reasons.  The area will be open like a brush burn and allowed to heal.   There will be no sutures.Tissue is sent to pathologist who will reconfirm diagnosis and verify completeness of lesion removal.    Description of Procedure  We would like to perform a skin excision today.  A local anesthetic, similar to the kind that a dentist uses when filling a cavity, will be injected with a very small needle into the skin area to be sampled.  The injected skin and tissue underneath should “go to sleep” and become numbed so that no further pain should be felt.  A scalpel will be used to cut around the lesion and tissue will be submitted to pathology for examination.  Depending on the diagnosis the lesion will be excised with a certain amount of normal skin to help assure completeness of lesion removal.  The physician will discuss in advance the anticipated size and extent of removal.   Bleeding will occur, but it will stopped with direct pressure, sutures, and electrocautery.    Surgical “Vaseline-type” ointment will also applied after the procedure to help create a barrier between the wound and the outside world.      Risks and Potential Complications  The advantage of a skin excision is that it allows us to remove a problem lesion quickly.  Although this usually permits the lesion to heal as soon as possible with the least scarring, there are some risks and potential complications that include but are not limited to the following:  Some bleeding is normal at time of procedure and some bleeding on gauze is normal  the first few days after surgery.  Profuse bleeding and bleeding with swelling  and pain should be reported as detailed  below  Infection is uncommon in skin surgery.  Infection should be reported and is indicated by pain, redness, and discharge of purulent material.  Some dull to at time sharp pain could occur initially the day after surgery.  Persistent pain or increasing pain days after surgery is not expected and should be reported.  Every effort is made to minimize scar, but location, size, and genetics do play a role in scar appearance.  A surgical wound does not achieve its optimal appearance until 6 months.  There are several treatments available if scarring would be problematic including scar creams, silicone pad, laser and scar revision.  Skin discoloration can occur especially in people of color.  Its important to avoid sun on wound in first 6 months after surgery.  Treatment is available if pigment is problematic.  Incomplete removal of the lesion or recurrence of lesion can occur and this would then require further treatment and more surgery.  Nerve Damage/Numbness/Loss of Function is very rare, but is most likely to occur if lesion is large or if it is in a high risk location  Allergic Reaction to lidocaine is rare.  More commonly,  epinephrine is used  with the lidocaine.  Occasionally, epinephrine (aka adrenalin) may cause a brief  feeling of anxiety or jitteriness.  The person at the microscope  (pathologist) may provide additional information that was unexpected. This unexpected finding could provoke the need for additional treatment or evaluation.    What You Will Need to Do After the Procedure  Keep the area clean and dry the first day. Try NOT to remove the bandage for the first day.  Gently clean the area with soap and water and apply Vaseline ointment (this is over the counter and not a prescription) to the excision  site for up to 2 weeks.    Apply a clean appropriately sized bandage to area.  Gauze and paper tape are recommended for sensitive skin.  Return for suture  removal as instructed (generally 1 week for the face, 2 weeks for the body).  Take Acetaminophen (Tylenol) for discomfort, if no contraindications.  Do NOT take Ibuprofen or aspirin unless specifically told to do so by your Dermatologist because these medications can make bleeding worse.  Call our office immediately for signs of infection: fever, chills, increased redness, warmth, tenderness, discomfort/pain, or pus or foul smell coming from the wound.    If bleeding is noticed, place a clean cloth over the area and apply firm pressure for thirty minutes.  Check the wound ONLY after 30 minutes of direct pressure; do not cheat and sneak a peak, as that does not count.  If bleeding persists after 30 minutes of legitimate direct pressure, then try one more round of direct pressure for an additional 10 minutes to the area.  Should the bleeding become heavier or not stop after the second attempt, call [facility] Dermatology directly at [phone] (SKIN) or, if after hours, go to your local Emergency Room/Emergency Department.

## 2024-02-08 ENCOUNTER — TELEPHONE (OUTPATIENT)
Dept: DERMATOLOGY | Facility: CLINIC | Age: 74
End: 2024-02-08

## 2024-02-08 ENCOUNTER — TELEPHONE (OUTPATIENT)
Age: 74
End: 2024-02-08

## 2024-02-08 NOTE — TELEPHONE ENCOUNTER
Patient called to ask a clinical question about his incision site. He was frustrated that I wasn't a nurse from the [facility] and was not happy with the new phone system. I informed him I am a clinical person who can advise if he was interested in my help if not I could try my best to reach someone else. I asked him what was his concern to help.      He removed bandages from yesterday's procedure and will be taking a shower today. His question was if he could apply a band aid that was not completley big enough to cover the sutures.   I advised yes you can if that's what you have at the moment to keep it covered as long as he is maintaining the wound clean with mild soap and water and applying Vaseline it will be fine. Now if he could purchase cotton gauze to cover the complete surface and apply medical tape that would be better.    I also advised that we recommend to keep it cover ideally the first 24-48 hours incase it's bleeding. Patient denies any bleeding after removing bandage. I also made him aware that it's okay to let it air out as well if he notes the band aid is irritating his skin as long as it's cleaned daily and Vaseline is applied it should be fine.    Patient understands.

## 2024-02-14 ENCOUNTER — OFFICE VISIT (OUTPATIENT)
Dept: DERMATOLOGY | Facility: CLINIC | Age: 74
End: 2024-02-14
Payer: MEDICARE

## 2024-02-14 DIAGNOSIS — D22.61 MULTIPLE BENIGN MELANOCYTIC NEVI OF UPPER AND LOWER EXTREMITIES AND TRUNK: Primary | ICD-10-CM

## 2024-02-14 DIAGNOSIS — D22.71 MULTIPLE BENIGN MELANOCYTIC NEVI OF UPPER AND LOWER EXTREMITIES AND TRUNK: Primary | ICD-10-CM

## 2024-02-14 DIAGNOSIS — D22.5 MULTIPLE BENIGN MELANOCYTIC NEVI OF UPPER AND LOWER EXTREMITIES AND TRUNK: Primary | ICD-10-CM

## 2024-02-14 DIAGNOSIS — D22.72 MULTIPLE BENIGN MELANOCYTIC NEVI OF UPPER AND LOWER EXTREMITIES AND TRUNK: Primary | ICD-10-CM

## 2024-02-14 DIAGNOSIS — L57.8 OTHER SKIN CHANGES DUE TO CHRONIC EXPOSURE TO NONIONIZING RADIATION: ICD-10-CM

## 2024-02-14 DIAGNOSIS — L82.1 SEBORRHEIC KERATOSIS: ICD-10-CM

## 2024-02-14 DIAGNOSIS — D22.62 MULTIPLE BENIGN MELANOCYTIC NEVI OF UPPER AND LOWER EXTREMITIES AND TRUNK: Primary | ICD-10-CM

## 2024-02-14 DIAGNOSIS — L81.4 SOLAR LENTIGO: ICD-10-CM

## 2024-02-14 DIAGNOSIS — D18.01 CHERRY ANGIOMA: ICD-10-CM

## 2024-02-14 PROCEDURE — 99213 OFFICE O/P EST LOW 20 MIN: CPT | Performed by: DERMATOLOGY

## 2024-02-14 NOTE — PROGRESS NOTES
"[facility] Note     Patient Name: Murphy Willams  Encounter Date: 2/14/24     Have you been cared for by a [facility] in the last 3 years and, if so, which description applies to you?    Yes.  I have been here within the last 3 years, and my medical history has NOT changed since that time.  I am MALE/not capable of bearing children.    REVIEW OF SYSTEMS:  Have you recently had or currently have any of the following? No changes in my recent health.   PAST MEDICAL HISTORY:  Have you personally ever had or currently have any of the following?  If \"YES,\" then please provide more detail. No changes in my medical history.   HISTORY OF IMMUNOSUPPRESSION: Do you have a history of any of the following:  Systemic Immunosuppression such as Diabetes, Biologic or Immunotherapy, Chemotherapy, Organ Transplantation, Bone Marrow Transplantation?  No     Answering \"YES\" requires the addition of the dotphrase \"IMMUNOSUPPRESSED\" as the first diagnosis of the patient's visit.   FAMILY HISTORY:  Any \"first degree relatives\" (parent, brother, sister, or child) with the following?    No changes in my family's known health.   PATIENT EXPERIENCE:    Do you want the Dermatologist to perform a COMPLETE skin exam today including a clinical examination under the \"bra and underwear\" areas?  Yes  If necessary, do we have your permission to call and leave a detailed message on your Preferred Phone number that includes your specific medical information?  Yes      Allergies   Allergen Reactions    Ciprofloxacin Other (See Comments)     LONG QT syndrome    Codeine Other (See Comments)     agitated    Sulfamethoxazole-Trimethoprim GI Intolerance, Abdominal Pain and Other (See Comments)     upset stomach      Current Outpatient Medications:     Acetaminophen 500 MG, Take 1,000 mg by mouth prn, Disp: , Rfl:     ascorbic acid (VITAMIN C) 250 MG CHEW, Chew 250 mg daily, Disp: , Rfl:     atorvastatin (LIPITOR) 20 mg tablet, " TAKE ONE TABLET BY MOUTH ONCE EVERY DAY, Disp: 90 tablet, Rfl: 3    Blood Pressure Monitoring (Omron 5 Series BP Monitor) PEE, , Disp: , Rfl:     busPIRone (BUSPAR) 7.5 mg tablet, 1 tablet in the morning and 1 at night, Disp: 180 tablet, Rfl: 0    dabigatran etexilate (PRADAXA) 150 mg capsu, Take 1 capsule (150 mg total) by mouth 2 (two) times a day, Disp: 8 capsule, Rfl: 0    dabigatran etexilate (PRADAXA) 150 mg capsu, Take 1 capsule (150 mg total) by mouth 2 (two) times a day, Disp: 180 capsule, Rfl: 2    Lactobacillus (PROBIOTIC ACIDOPHILUS PO), Take by mouth, Disp: , Rfl:     LORazepam (Ativan) 0.5 mg tablet, Take 1 tablet (0.5 mg total) by mouth daily as needed for anxiety, Disp: 60 tablet, Rfl: 1    losartan (COZAAR) 50 mg tablet, Take 1 tablet (50 mg total) by mouth daily, Disp: 90 tablet, Rfl: 1    montelukast (SINGULAIR) 10 mg tablet, TAKE ONE TABLET BY MOUTH DAILY, Disp: 90 tablet, Rfl: 3    nadolol (CORGARD) 80 MG tablet, Take 1 tablet (80 mg total) by mouth daily in the early morning, Disp: 90 tablet, Rfl: 3    Omega-3 Fatty Acids (FISH OIL) 1,000 mg, Take by mouth daily, Disp: , Rfl:     omeprazole (PriLOSEC) 40 MG capsule, Take one capsule by mouth once every day., Disp: 90 capsule, Rfl: 3    spironolactone (ALDACTONE) 25 mg tablet, Take 1 tablet (25 mg total) by mouth daily, Disp: 90 tablet, Rfl: 1    triamcinolone (KENALOG) 0.1 % ointment, Apply twice daily to spot on clavicle until next visit. May stop if resolved prior to that time, Disp: 15 g, Rfl: 0    zolpidem (AMBIEN) 5 mg tablet, Take 1/2-1 full tablet at bedtime as needed for sleep.  Do not take if drinking any alcohol or taking lorazepam, Disp: 30 tablet, Rfl: 0    docusate sodium (COLACE) 100 mg capsule, Take 1 capsule (100 mg total) by mouth 2 (two) times a day, Disp: 60 capsule, Rfl: 0    vitamin B-12 (CYANOCOBALAMIN) 500 MCG TABS, Take 1 tablet (500 mcg total) by mouth daily, Disp: 30 tablet, Rfl: 0          Whom besides the patient is  providing clinical information about today's encounter?   NO ADDITIONAL HISTORIAN (patient alone provided history)    Physical Exam and Assessment/Plan by Diagnosis:    HISTORY OF SQUAMOUS CELL CARCINOMA     Physical Exam:  Anatomic Location Affected:  Left Clavicle  Morphological Description of Scar:  Well healing Stitched scar  Suspected Recurrence: no  Regional adenopathy: no    Additional History of Present Condition:  Patient had a SCC removed on 2/7/24. His scar is stitched and healing well.     Assessment and Plan:  Based on a thorough discussion of this condition and the management approach to it (including a comprehensive discussion of the known risks, side effects and potential benefits of treatment), the patient (family) agrees to implement the following specific plan:  Instructed patient on keeping healing scar covered when doing anything that may make it dirty.   Patient will return in 1 week to get his stitches out.     How can SCC be prevented?  The most important way to prevent SCC is to avoid sunburn. This is especially important in childhood and early life. Fair skinned individuals and those with a personal or family history of BCC should protect their skin from sun exposure daily, year-round and lifelong.  Stay indoors or under the shade in the middle of the day   Wear covering clothing   Apply high protection factor SPF50+ broad-spectrum sunscreens generously to exposed skin if outdoors   Avoid indoor tanning (sun beds, solaria)      What is the outlook for SCC?  Most SCC are cured by treatment. Cure is most likely if treatment is undertaken when the lesion is small. A small percent of SCC's can spread to lymph  nodes and long term monitoring is indicated.   They are also at increased risk of other skin cancers, especially melanoma. Regular self-skin examinations and long-term annual skin checks by an experienced health professional are recommended.        GREEN ANGIOMAS     Physical  "Exam:  Anatomic Location Affected:  Trunk and extremities  Morphological Description:  Scattered cherry red papules  Denies pain, itch, bleeding. No treatments tried. Present for years. Present constantly; no modifying factors which make it worse or better.     Assessment and Plan:  Based on a thorough discussion of this condition and the management approach to it (including a comprehensive discussion of the known risks, side effects and potential benefits of treatment), the patient (family) agrees to implement the following specific plan:  Reassure benign        SEBORRHEIC KERATOSIS; NON-INFLAMED     Physical Exam:  Anatomic Location Affected:  Trunk and extremities  Morphological Description:  Waxy, smooth to warty textured, yellow to brownish-grey to dark brown to blackish, discrete, \"stuck-on\" appearing papules.  Present for years. Denies pain, itch, bleeding.      Additional History of Present Condition:  Present constantly; no modifying factors which make it worse or better. No prior treatment.       Assessment and Plan:  Based on a thorough discussion of this condition and the management approach to it (including a comprehensive discussion of the known risks, side effects and potential benefits of treatment), the patient (family) agrees to implement the following specific plan:  Reassure benign  Use sun protection.  Apply SPF 30 or higher at least three times a day.  Wear sun protecting clothing and hats.        SOLAR LENTIGINES   OTHER SKIN CHANGES DUE TO CHRONIC EXPOSURE TO NONIONIZING RADIATION     Physical Exam:  Anatomic Location Affected:  Sun exposed areas of back, chest, arms, legs  Morphological Description:  Multiple scattered brown to tan evenly pigmented macules   Denies pain, itch, bleeding. No treatments tried. Present for months - years. Reports getting newer lesions with sun exposure.         Assessment and Plan:  Based on a thorough discussion of this condition and the management approach to " "it (including a comprehensive discussion of the known risks, side effects and potential benefits of treatment), the patient (family) agrees to implement the following specific plan:  Reassure benign  Use sun protection.  Apply SPF 30 or higher at least three times a day.  Wear sun protecting clothing and hats.         MULTIPLE MELANOCYTIC NEVI (\"Moles\")     Physical Exam:  Anatomic Location Affected: Trunk and extremities  Morphological Description:  Scattered, round to ovoid, symmetrical-appearing, even bordered, skin colored to dark brown macules/papules  Denies pain, itch, bleeding. No treatments tried. Present for years. Present constantly; no modifying factors which make it worse or better. Denies actively changing or growing moles.      Assessment and Plan:  Based on a thorough discussion of this condition and the management approach to it (including a comprehensive discussion of the known risks, side effects and potential benefits of treatment), the patient (family) agrees to implement the following specific plan:  Reassure benign  Monitor for changes  Use sun protection.  Apply SPF 30 or higher at least three times a day.  Wear sun protecting clothing and hats.       Worrisome signs of skin malignancy discussed, questions answered. Regular self-skin check discussed. Advised to call or return to office if patient notices any spots of concern, rapidly growing/changing lesions, bleeding lesions, non-healing lesions. Advised regular SPF use.     Scribe Attestation      I,:  Colin Hassan am acting as a scribe while in the presence of the attending physician.:       I,:  Dov Chacko MD personally performed the services described in this documentation    as scribed in my presence.:             "

## 2024-02-16 ENCOUNTER — RA CDI HCC (OUTPATIENT)
Dept: OTHER | Facility: HOSPITAL | Age: 74
End: 2024-02-16

## 2024-02-20 ENCOUNTER — APPOINTMENT (OUTPATIENT)
Dept: LAB | Facility: CLINIC | Age: 74
End: 2024-02-20
Payer: MEDICARE

## 2024-02-20 ENCOUNTER — TELEPHONE (OUTPATIENT)
Age: 74
End: 2024-02-20

## 2024-02-20 DIAGNOSIS — I50.42 CHRONIC COMBINED SYSTOLIC AND DIASTOLIC CONGESTIVE HEART FAILURE (HCC): ICD-10-CM

## 2024-02-20 DIAGNOSIS — I10 ESSENTIAL HYPERTENSION: Chronic | ICD-10-CM

## 2024-02-20 DIAGNOSIS — R73.9 HYPERGLYCEMIA: ICD-10-CM

## 2024-02-20 DIAGNOSIS — I27.20 PULMONARY HYPERTENSION (HCC): Chronic | ICD-10-CM

## 2024-02-20 DIAGNOSIS — F33.9 DEPRESSION, RECURRENT (HCC): ICD-10-CM

## 2024-02-20 DIAGNOSIS — C91.10 CLL (CHRONIC LYMPHOCYTIC LEUKEMIA) (HCC): ICD-10-CM

## 2024-02-20 DIAGNOSIS — I48.0 PAROXYSMAL A-FIB (HCC): ICD-10-CM

## 2024-02-20 LAB
ALBUMIN SERPL BCP-MCNC: 4.7 G/DL (ref 3.5–5)
ALP SERPL-CCNC: 85 U/L (ref 34–104)
ALT SERPL W P-5'-P-CCNC: 22 U/L (ref 7–52)
ANION GAP SERPL CALCULATED.3IONS-SCNC: 6 MMOL/L
ANISOCYTOSIS BLD QL SMEAR: PRESENT
AST SERPL W P-5'-P-CCNC: 20 U/L (ref 13–39)
BASOPHILS # BLD MANUAL: 0 THOUSAND/UL (ref 0–0.1)
BASOPHILS NFR MAR MANUAL: 0 % (ref 0–1)
BILIRUB SERPL-MCNC: 0.69 MG/DL (ref 0.2–1)
BUN SERPL-MCNC: 15 MG/DL (ref 5–25)
CALCIUM SERPL-MCNC: 9.7 MG/DL (ref 8.4–10.2)
CHLORIDE SERPL-SCNC: 102 MMOL/L (ref 96–108)
CHOLEST SERPL-MCNC: 104 MG/DL
CO2 SERPL-SCNC: 28 MMOL/L (ref 21–32)
CREAT SERPL-MCNC: 1.05 MG/DL (ref 0.6–1.3)
DIFFERENTIAL COMMENT: ABNORMAL
EOSINOPHIL # BLD MANUAL: 0 THOUSAND/UL (ref 0–0.4)
EOSINOPHIL NFR BLD MANUAL: 0 % (ref 0–6)
ERYTHROCYTE [DISTWIDTH] IN BLOOD BY AUTOMATED COUNT: 14.5 % (ref 11.6–15.1)
EST. AVERAGE GLUCOSE BLD GHB EST-MCNC: 120 MG/DL
GFR SERPL CREATININE-BSD FRML MDRD: 71 ML/MIN/1.73SQ M
GLUCOSE P FAST SERPL-MCNC: 101 MG/DL (ref 65–99)
HBA1C MFR BLD: 5.8 %
HCT VFR BLD AUTO: 37.4 % (ref 36.5–49.3)
HDLC SERPL-MCNC: 28 MG/DL
HGB BLD-MCNC: 12 G/DL (ref 12–17)
IGA SERPL-MCNC: 502 MG/DL (ref 66–433)
IGG SERPL-MCNC: 1138 MG/DL (ref 635–1741)
IGM SERPL-MCNC: 77 MG/DL (ref 45–281)
LDH SERPL-CCNC: 162 U/L (ref 140–271)
LDLC SERPL CALC-MCNC: 53 MG/DL (ref 0–100)
LYMPHOCYTES # BLD AUTO: 36.26 THOUSAND/UL (ref 0.6–4.47)
LYMPHOCYTES # BLD AUTO: 90 % (ref 14–44)
MACROCYTES BLD QL AUTO: PRESENT
MCH RBC QN AUTO: 33.1 PG (ref 26.8–34.3)
MCHC RBC AUTO-ENTMCNC: 32.1 G/DL (ref 31.4–37.4)
MCV RBC AUTO: 103 FL (ref 82–98)
MONOCYTES # BLD AUTO: 0 THOUSAND/UL (ref 0–1.22)
MONOCYTES NFR BLD: 0 % (ref 4–12)
NEUTROPHILS # BLD MANUAL: 2.73 THOUSAND/UL (ref 1.85–7.62)
NEUTS SEG NFR BLD AUTO: 7 % (ref 43–75)
PLATELET # BLD AUTO: 191 THOUSANDS/UL (ref 149–390)
PLATELET BLD QL SMEAR: ADEQUATE
PMV BLD AUTO: 10.8 FL (ref 8.9–12.7)
POLYCHROMASIA BLD QL SMEAR: PRESENT
POTASSIUM SERPL-SCNC: 5.3 MMOL/L (ref 3.5–5.3)
PROT SERPL-MCNC: 7.9 G/DL (ref 6.4–8.4)
RBC # BLD AUTO: 3.63 MILLION/UL (ref 3.88–5.62)
RBC MORPH BLD: PRESENT
SMUDGE CELLS BLD QL SMEAR: PRESENT
SODIUM SERPL-SCNC: 136 MMOL/L (ref 135–147)
TRIGL SERPL-MCNC: 116 MG/DL
TSH SERPL DL<=0.05 MIU/L-ACNC: 1.55 UIU/ML (ref 0.45–4.5)
URATE SERPL-MCNC: 6.7 MG/DL (ref 3.5–8.5)
VARIANT LYMPHS # BLD AUTO: 3 %
WBC # BLD AUTO: 38.99 THOUSAND/UL (ref 4.31–10.16)

## 2024-02-20 PROCEDURE — 84165 PROTEIN E-PHORESIS SERUM: CPT

## 2024-02-20 PROCEDURE — 82784 ASSAY IGA/IGD/IGG/IGM EACH: CPT

## 2024-02-20 PROCEDURE — 84443 ASSAY THYROID STIM HORMONE: CPT

## 2024-02-20 PROCEDURE — 80053 COMPREHEN METABOLIC PANEL: CPT

## 2024-02-20 PROCEDURE — 84550 ASSAY OF BLOOD/URIC ACID: CPT

## 2024-02-20 PROCEDURE — 36415 COLL VENOUS BLD VENIPUNCTURE: CPT

## 2024-02-20 PROCEDURE — 85007 BL SMEAR W/DIFF WBC COUNT: CPT

## 2024-02-20 PROCEDURE — 82232 ASSAY OF BETA-2 PROTEIN: CPT

## 2024-02-20 PROCEDURE — 80061 LIPID PANEL: CPT

## 2024-02-20 PROCEDURE — 85027 COMPLETE CBC AUTOMATED: CPT

## 2024-02-20 PROCEDURE — 83036 HEMOGLOBIN GLYCOSYLATED A1C: CPT

## 2024-02-20 PROCEDURE — 83615 LACTATE (LD) (LDH) ENZYME: CPT

## 2024-02-20 NOTE — TELEPHONE ENCOUNTER
Patient called stating he had an excision on 2/7/24 was wondering why the pathology results were back yet I said it could take up to 2 weeks and the lab could be backed up, left patient know the results will come through his mychart and someone will be calling him with them as well

## 2024-02-22 ENCOUNTER — OFFICE VISIT (OUTPATIENT)
Dept: DERMATOLOGY | Facility: CLINIC | Age: 74
End: 2024-02-22

## 2024-02-22 DIAGNOSIS — Z48.02 VISIT FOR SUTURE REMOVAL: Primary | ICD-10-CM

## 2024-02-22 LAB
ALBUMIN SERPL ELPH-MCNC: 4.3 G/DL (ref 3.2–5.1)
ALBUMIN SERPL ELPH-MCNC: 56.6 % (ref 48–70)
ALPHA1 GLOB SERPL ELPH-MCNC: 0.3 G/DL (ref 0.15–0.47)
ALPHA1 GLOB SERPL ELPH-MCNC: 3.9 % (ref 1.8–7)
ALPHA2 GLOB SERPL ELPH-MCNC: 0.78 G/DL (ref 0.42–1.04)
ALPHA2 GLOB SERPL ELPH-MCNC: 10.2 % (ref 5.9–14.9)
BETA GLOB ABNORMAL SERPL ELPH-MCNC: 0.49 G/DL (ref 0.31–0.57)
BETA1 GLOB SERPL ELPH-MCNC: 6.5 % (ref 4.7–7.7)
BETA2 GLOB SERPL ELPH-MCNC: 7 % (ref 3.1–7.9)
BETA2+GAMMA GLOB SERPL ELPH-MCNC: 0.53 G/DL (ref 0.2–0.58)
GAMMA GLOB ABNORMAL SERPL ELPH-MCNC: 1.2 G/DL (ref 0.4–1.66)
GAMMA GLOB SERPL ELPH-MCNC: 15.8 % (ref 6.9–22.3)
IGG/ALB SER: 1.3 {RATIO} (ref 1.1–1.8)
PROT PATTERN SERPL ELPH-IMP: NORMAL
PROT SERPL-MCNC: 7.6 G/DL (ref 6.4–8.2)

## 2024-02-22 PROCEDURE — RECHECK: Performed by: DERMATOLOGY

## 2024-02-22 PROCEDURE — 84165 PROTEIN E-PHORESIS SERUM: CPT | Performed by: STUDENT IN AN ORGANIZED HEALTH CARE EDUCATION/TRAINING PROGRAM

## 2024-02-22 NOTE — PROGRESS NOTES
"Suture removal    Date/Time: 2/22/2024 11:30 AM    Performed by: Lanny Chen  Authorized by: Clarice Madrid MD  Universal Protocol:  Consent: Verbal consent obtained. Written consent not obtained.  Risks and benefits: risks, benefits and alternatives were discussed  Consent given by: patient  Time out: Immediately prior to procedure a \"time out\" was called to verify the correct patient, procedure, equipment, support staff and site/side marked as required.  Timeout called at: 2/22/2024 11:36 AM.  Patient understanding: patient states understanding of the procedure being performed  Patient consent: the patient's understanding of the procedure matches consent given  Procedure consent: procedure consent matches procedure scheduled  Relevant documents: relevant documents present and verified  Test results: test results not available  Site marked: the operative site was not marked  Radiology Images displayed and confirmed. If images not available, report reviewed: imaging studies not available  Patient identity confirmed: verbally with patient      Patient location:  Clinic  Location:     Laterality:  Left    Location:  Other (comment) (clavicle)  Procedure details:     Tools used:  Suture removal kit    Wound appearance:  No sign(s) of infection, good wound healing, pink and clean    Number of sutures removed:  14  Post-procedure details:     Post-removal:  Band-Aid applied (with Vaseline)    Patient tolerance of procedure:  Tolerated well, no immediate complications             "

## 2024-02-23 ENCOUNTER — PATIENT MESSAGE (OUTPATIENT)
Dept: FAMILY MEDICINE CLINIC | Facility: CLINIC | Age: 74
End: 2024-02-23

## 2024-02-23 ENCOUNTER — OFFICE VISIT (OUTPATIENT)
Dept: FAMILY MEDICINE CLINIC | Facility: CLINIC | Age: 74
End: 2024-02-23
Payer: MEDICARE

## 2024-02-23 VITALS
RESPIRATION RATE: 16 BRPM | DIASTOLIC BLOOD PRESSURE: 78 MMHG | BODY MASS INDEX: 29.83 KG/M2 | WEIGHT: 196.2 LBS | HEART RATE: 86 BPM | SYSTOLIC BLOOD PRESSURE: 138 MMHG | OXYGEN SATURATION: 97 % | TEMPERATURE: 98 F

## 2024-02-23 DIAGNOSIS — E53.8 VITAMIN B 12 DEFICIENCY: ICD-10-CM

## 2024-02-23 DIAGNOSIS — R56.9 SEIZURE-LIKE ACTIVITY (HCC): ICD-10-CM

## 2024-02-23 DIAGNOSIS — I27.20 PULMONARY HYPERTENSION (HCC): ICD-10-CM

## 2024-02-23 DIAGNOSIS — I48.0 PAROXYSMAL A-FIB (HCC): ICD-10-CM

## 2024-02-23 DIAGNOSIS — R73.9 HYPERGLYCEMIA: ICD-10-CM

## 2024-02-23 DIAGNOSIS — N13.8 BPH WITH OBSTRUCTION/LOWER URINARY TRACT SYMPTOMS: Chronic | ICD-10-CM

## 2024-02-23 DIAGNOSIS — D59.10 AUTOIMMUNE HEMOLYTIC ANEMIA (HCC): ICD-10-CM

## 2024-02-23 DIAGNOSIS — C91.10 CLL (CHRONIC LYMPHOCYTIC LEUKEMIA) (HCC): ICD-10-CM

## 2024-02-23 DIAGNOSIS — F33.9 DEPRESSION, RECURRENT (HCC): ICD-10-CM

## 2024-02-23 DIAGNOSIS — K21.9 GASTROESOPHAGEAL REFLUX DISEASE WITHOUT ESOPHAGITIS: ICD-10-CM

## 2024-02-23 DIAGNOSIS — D50.9 IRON DEFICIENCY ANEMIA, UNSPECIFIED IRON DEFICIENCY ANEMIA TYPE: Chronic | ICD-10-CM

## 2024-02-23 DIAGNOSIS — C44.529 SQUAMOUS CELL CARCINOMA OF TRUNK: ICD-10-CM

## 2024-02-23 DIAGNOSIS — N40.1 BPH WITH OBSTRUCTION/LOWER URINARY TRACT SYMPTOMS: Chronic | ICD-10-CM

## 2024-02-23 DIAGNOSIS — E78.00 HYPERCHOLESTEROLEMIA: Chronic | ICD-10-CM

## 2024-02-23 DIAGNOSIS — I49.01 VENTRICULAR FIBRILLATION (HCC): ICD-10-CM

## 2024-02-23 DIAGNOSIS — I20.89 ANGINA OF EFFORT: ICD-10-CM

## 2024-02-23 DIAGNOSIS — I71.21 ANEURYSM OF THE ASCENDING AORTA, WITHOUT RUPTURE (HCC): Primary | ICD-10-CM

## 2024-02-23 PROCEDURE — 99214 OFFICE O/P EST MOD 30 MIN: CPT | Performed by: FAMILY MEDICINE

## 2024-02-23 NOTE — ASSESSMENT & PLAN NOTE
Readings look excellent.  Blood pressure today was mildly elevated but came down throughout the visit.  Continue current regimen.

## 2024-02-23 NOTE — ASSESSMENT & PLAN NOTE
Wt Readings from Last 3 Encounters:   02/23/24 89 kg (196 lb 3.2 oz)   01/22/24 90.3 kg (199 lb)   01/09/24 90.3 kg (199 lb)       He appears clinically stable today.

## 2024-02-23 NOTE — PROGRESS NOTES
Assessment/Plan:       Problem List Items Addressed This Visit        Digestive    Gastroesophageal reflux disease without esophagitis     Reflux stable at this time.  He does follow with GI intermittently.            Cardiovascular and Mediastinum    Pulmonary hypertension (HCC) (Chronic)     He remains clinically stable.         Relevant Orders    Comprehensive metabolic panel    Ventricular fibrillation (HCC)     He is in sinus rhythm today.  He is status post ICD.  He had his cardiac event back in March 2023         Relevant Orders    Comprehensive metabolic panel    Paroxysmal A-fib (HCC)     Continue on current  management including anticoagulation         Relevant Orders    TSH, 3rd generation with Free T4 reflex    Aneurysm of the ascending aorta, without rupture (HCC) - Primary    Relevant Orders    Lipid Panel with Direct LDL reflex    RESOLVED: Angina of effort    Relevant Orders    Lipid Panel with Direct LDL reflex    TSH, 3rd generation with Free T4 reflex       Musculoskeletal and Integument    Squamous cell carcinoma of trunk     Continue to follow with Derm.            Genitourinary    BPH with obstruction/lower urinary tract symptoms (Chronic)       Other    CLL (chronic lymphocytic leukemia) (HCC) (Chronic)    Relevant Orders    Comprehensive metabolic panel    Iron deficiency anemia (Chronic)    Hypercholesterolemia (Chronic)    Relevant Orders    Comprehensive metabolic panel    Lipid Panel with Direct LDL reflex    Depression, recurrent (HCC)    Relevant Orders    Comprehensive metabolic panel    TSH, 3rd generation with Free T4 reflex    Autoimmune hemolytic anemia (HCC)    Relevant Orders    Comprehensive metabolic panel    Vitamin B 12 deficiency    RESOLVED: Seizure-like activity (HCC)   Other Visit Diagnoses     Hyperglycemia        Relevant Orders    Hemoglobin A1C            Subjective:      Patient ID: Murphy Willams is a 73 y.o. male.    Patient presents today for follow-up of chronic  health issues.  Home blood pressures are reviewed and look pretty good overall.  He is feeling pretty well.  He denies any current problems with angina but has had chest pain in the past with exertion.  He denies excessive shortness of breath.  He said no syncopal episodes.  It has been a year since his cardiac event back in March 2023.  His depression seems pretty stable.  He stopped buspirone for anxiety as he felt it was not helping.  He has really cut back on alcohol which I believe has been very helpful.        The following portions of the patient's history were reviewed and updated as appropriate: allergies, current medications, past family history, past medical history, past social history, past surgical history and problem list.      Current Outpatient Medications:   •  Acetaminophen 500 MG, Take 1,000 mg by mouth prn, Disp: , Rfl:   •  ascorbic acid (VITAMIN C) 250 MG CHEW, Chew 250 mg daily, Disp: , Rfl:   •  atorvastatin (LIPITOR) 20 mg tablet, TAKE ONE TABLET BY MOUTH ONCE EVERY DAY, Disp: 90 tablet, Rfl: 3  •  Blood Pressure Monitoring (Omron 5 Series BP Monitor) PEE, , Disp: , Rfl:   •  busPIRone (BUSPAR) 7.5 mg tablet, 1 tablet in the morning and 1 at night, Disp: 180 tablet, Rfl: 0  •  dabigatran etexilate (PRADAXA) 150 mg capsu, Take 1 capsule (150 mg total) by mouth 2 (two) times a day, Disp: 180 capsule, Rfl: 2  •  Lactobacillus (PROBIOTIC ACIDOPHILUS PO), Take by mouth, Disp: , Rfl:   •  LORazepam (Ativan) 0.5 mg tablet, Take 1 tablet (0.5 mg total) by mouth daily as needed for anxiety, Disp: 60 tablet, Rfl: 1  •  losartan (COZAAR) 50 mg tablet, Take 1 tablet (50 mg total) by mouth daily, Disp: 90 tablet, Rfl: 1  •  montelukast (SINGULAIR) 10 mg tablet, TAKE ONE TABLET BY MOUTH DAILY, Disp: 90 tablet, Rfl: 3  •  nadolol (CORGARD) 80 MG tablet, Take 1 tablet (80 mg total) by mouth daily in the early morning, Disp: 90 tablet, Rfl: 3  •  Omega-3 Fatty Acids (FISH OIL) 1,000 mg, Take by mouth  daily, Disp: , Rfl:   •  omeprazole (PriLOSEC) 40 MG capsule, Take one capsule by mouth once every day., Disp: 90 capsule, Rfl: 3  •  spironolactone (ALDACTONE) 25 mg tablet, Take 1 tablet (25 mg total) by mouth daily, Disp: 90 tablet, Rfl: 1  •  triamcinolone (KENALOG) 0.1 % ointment, Apply twice daily to spot on clavicle until next visit. May stop if resolved prior to that time, Disp: 15 g, Rfl: 0  •  zolpidem (AMBIEN) 5 mg tablet, Take 1/2-1 full tablet at bedtime as needed for sleep.  Do not take if drinking any alcohol or taking lorazepam, Disp: 30 tablet, Rfl: 0  •  docusate sodium (COLACE) 100 mg capsule, Take 1 capsule (100 mg total) by mouth 2 (two) times a day, Disp: 60 capsule, Rfl: 0  •  vitamin B-12 (CYANOCOBALAMIN) 500 MCG TABS, Take 1 tablet (500 mcg total) by mouth daily, Disp: 30 tablet, Rfl: 0     Review of Systems   Constitutional:  Negative for appetite change, chills, fatigue, fever and unexpected weight change.   HENT:  Negative for trouble swallowing.    Eyes:  Negative for visual disturbance.   Respiratory:  Positive for shortness of breath. Negative for cough, chest tightness and wheezing.    Cardiovascular:  Negative for chest pain, palpitations and leg swelling.   Gastrointestinal:  Negative for abdominal distention, abdominal pain, blood in stool, constipation and diarrhea.   Endocrine: Negative for polyuria.   Genitourinary:  Negative for difficulty urinating and flank pain.   Musculoskeletal:  Negative for arthralgias and myalgias.   Skin:  Negative for rash.   Neurological:  Negative for dizziness and light-headedness.   Hematological:  Negative for adenopathy. Does not bruise/bleed easily.   Psychiatric/Behavioral:  Negative for dysphoric mood and sleep disturbance. The patient is not nervous/anxious.          Objective:      /78 (BP Location: Left arm, Patient Position: Sitting, Cuff Size: Large)   Pulse 86   Temp 98 °F (36.7 °C) (Tympanic)   Resp 16   Wt 89 kg (196 lb 3.2  oz)   SpO2 97%   BMI 29.83 kg/m²          Physical Exam  Vitals reviewed.   Constitutional:       General: He is not in acute distress.     Appearance: He is well-developed. He is obese. He is not diaphoretic.   HENT:      Head: Normocephalic.   Eyes:      General:         Right eye: No discharge.         Left eye: No discharge.      Pupils: Pupils are equal, round, and reactive to light.   Neck:      Thyroid: No thyromegaly.      Trachea: No tracheal deviation.   Cardiovascular:      Rate and Rhythm: Normal rate and regular rhythm.      Heart sounds: Normal heart sounds. No murmur heard.  Pulmonary:      Effort: Pulmonary effort is normal. No respiratory distress.      Breath sounds: No wheezing or rales.   Abdominal:      General: There is no distension.      Palpations: Abdomen is soft.      Tenderness: There is no abdominal tenderness.   Musculoskeletal:         General: Normal range of motion.      Right lower leg: No edema.      Left lower leg: No edema.   Lymphadenopathy:      Cervical: No cervical adenopathy.   Skin:     General: Skin is warm.      Findings: No erythema.   Neurological:      General: No focal deficit present.      Mental Status: He is alert and oriented to person, place, and time.      Gait: Gait normal.   Psychiatric:         Thought Content: Thought content normal.         Judgment: Judgment normal.           Pravin Ortega Jr, MD

## 2024-02-26 ENCOUNTER — TELEPHONE (OUTPATIENT)
Dept: FAMILY MEDICINE CLINIC | Facility: CLINIC | Age: 74
End: 2024-02-26

## 2024-02-26 DIAGNOSIS — Z00.8 PODIATRY VISIT, ROUTINE: Primary | ICD-10-CM

## 2024-02-26 NOTE — PATIENT COMMUNICATION
Received call from patient asking to speak with Dr. Castaneda regarding ascending aortic aneurysm. He is very anxious to hear back from MD to discuss this.  Please advise.

## 2024-02-27 ENCOUNTER — OFFICE VISIT (OUTPATIENT)
Dept: HEMATOLOGY ONCOLOGY | Facility: CLINIC | Age: 74
End: 2024-02-27
Payer: MEDICARE

## 2024-02-27 VITALS
DIASTOLIC BLOOD PRESSURE: 68 MMHG | BODY MASS INDEX: 29.86 KG/M2 | SYSTOLIC BLOOD PRESSURE: 122 MMHG | OXYGEN SATURATION: 99 % | HEART RATE: 88 BPM | RESPIRATION RATE: 16 BRPM | WEIGHT: 197 LBS | HEIGHT: 68 IN | TEMPERATURE: 98.8 F

## 2024-02-27 DIAGNOSIS — M19.90 ARTHRITIS: ICD-10-CM

## 2024-02-27 DIAGNOSIS — C91.10 CLL (CHRONIC LYMPHOCYTIC LEUKEMIA) (HCC): Primary | ICD-10-CM

## 2024-02-27 DIAGNOSIS — N40.1 BPH WITH OBSTRUCTION/LOWER URINARY TRACT SYMPTOMS: ICD-10-CM

## 2024-02-27 DIAGNOSIS — N13.8 BPH WITH OBSTRUCTION/LOWER URINARY TRACT SYMPTOMS: ICD-10-CM

## 2024-02-27 DIAGNOSIS — I27.20 PULMONARY HYPERTENSION (HCC): ICD-10-CM

## 2024-02-27 DIAGNOSIS — D59.10 AUTOIMMUNE HEMOLYTIC ANEMIA (HCC): ICD-10-CM

## 2024-02-27 DIAGNOSIS — I48.0 PAROXYSMAL A-FIB (HCC): ICD-10-CM

## 2024-02-27 DIAGNOSIS — I50.42 CHRONIC COMBINED SYSTOLIC AND DIASTOLIC CONGESTIVE HEART FAILURE (HCC): ICD-10-CM

## 2024-02-27 DIAGNOSIS — E53.8 VITAMIN B 12 DEFICIENCY: ICD-10-CM

## 2024-02-27 PROBLEM — I71.21 ANEURYSM OF THE ASCENDING AORTA, WITHOUT RUPTURE (HCC): Status: RESOLVED | Noted: 2024-02-23 | Resolved: 2024-02-27

## 2024-02-27 PROCEDURE — 99214 OFFICE O/P EST MOD 30 MIN: CPT | Performed by: INTERNAL MEDICINE

## 2024-02-27 PROCEDURE — G2211 COMPLEX E/M VISIT ADD ON: HCPCS | Performed by: INTERNAL MEDICINE

## 2024-02-27 NOTE — PROGRESS NOTES
HPI: Continuation of care.  Patient is here with his wife.  In 2010 patient was diagnosed to have CLL.  He also had iron deficiency and had GI evaluation and had intravenous Venofer.  He follows with his gastroenterologist.   In May 2021 he was found to have profound autoimmune hemolytic anemia with significant drop in hemoglobin and he was treated with prednisone.  No more hemolysis.  Hemoglobin has been stable.  He has enlarged prostate and nocturia and he follows with his urologist.    He has history of QT prolongation and has a pacemaker/defibrillator and he follows with his cardiologist and electrophysiologist.    He also follows with his lung specialist but exertional dyspnea.    Has tiredness and arthritis and low back discomfort.  He gives history of B12 deficiency and has been taking B12 orally.            Current Outpatient Medications:   •  Acetaminophen 500 MG, Take 1,000 mg by mouth prn, Disp: , Rfl:   •  ascorbic acid (VITAMIN C) 250 MG CHEW, Chew 250 mg daily, Disp: , Rfl:   •  atorvastatin (LIPITOR) 20 mg tablet, TAKE ONE TABLET BY MOUTH ONCE EVERY DAY, Disp: 90 tablet, Rfl: 3  •  Blood Pressure Monitoring (Omron 5 Series BP Monitor) PEE, , Disp: , Rfl:   •  busPIRone (BUSPAR) 7.5 mg tablet, 1 tablet in the morning and 1 at night, Disp: 180 tablet, Rfl: 0  •  dabigatran etexilate (PRADAXA) 150 mg capsu, Take 1 capsule (150 mg total) by mouth 2 (two) times a day, Disp: 180 capsule, Rfl: 2  •  Lactobacillus (PROBIOTIC ACIDOPHILUS PO), Take by mouth, Disp: , Rfl:   •  LORazepam (Ativan) 0.5 mg tablet, Take 1 tablet (0.5 mg total) by mouth daily as needed for anxiety, Disp: 60 tablet, Rfl: 1  •  losartan (COZAAR) 50 mg tablet, Take 1 tablet (50 mg total) by mouth daily, Disp: 90 tablet, Rfl: 1  •  montelukast (SINGULAIR) 10 mg tablet, TAKE ONE TABLET BY MOUTH DAILY, Disp: 90 tablet, Rfl: 3  •  nadolol (CORGARD) 80 MG tablet, Take 1 tablet (80 mg total) by mouth daily in the early morning, Disp: 90  "tablet, Rfl: 3  •  Omega-3 Fatty Acids (FISH OIL) 1,000 mg, Take by mouth daily, Disp: , Rfl:   •  omeprazole (PriLOSEC) 40 MG capsule, Take one capsule by mouth once every day., Disp: 90 capsule, Rfl: 3  •  spironolactone (ALDACTONE) 25 mg tablet, Take 1 tablet (25 mg total) by mouth daily, Disp: 90 tablet, Rfl: 1  •  triamcinolone (KENALOG) 0.1 % ointment, Apply twice daily to spot on clavicle until next visit. May stop if resolved prior to that time, Disp: 15 g, Rfl: 0  •  zolpidem (AMBIEN) 5 mg tablet, Take 1/2-1 full tablet at bedtime as needed for sleep.  Do not take if drinking any alcohol or taking lorazepam, Disp: 30 tablet, Rfl: 0  •  docusate sodium (COLACE) 100 mg capsule, Take 1 capsule (100 mg total) by mouth 2 (two) times a day, Disp: 60 capsule, Rfl: 0  •  vitamin B-12 (CYANOCOBALAMIN) 500 MCG TABS, Take 1 tablet (500 mcg total) by mouth daily, Disp: 30 tablet, Rfl: 0    Allergies   Allergen Reactions   • Ciprofloxacin Other (See Comments)     LONG QT syndrome   • Codeine Other (See Comments)     agitated   • Sulfamethoxazole-Trimethoprim GI Intolerance, Abdominal Pain and Other (See Comments)     upset stomach       Oncology History    No history exists.       ROS:  02/29/24 Reviewed 12 systems: See symptoms in HPI  Presently no other neurological, cardiac, pulmonary, GI and  symptoms other than mentioned in HPI.  Other symptoms are in HPI. No symptoms like fever, chills, bleeding, bone pains, skin rash, weight loss , night sweats,  weakness, numbness,  claudication and gait problem. No frequent infections.  Not unusually sensitive to heat or cold. No swelling of the ankles. No swollen glands.  Patient is anxious.       /68 (BP Location: Left arm, Patient Position: Sitting, Cuff Size: Adult)   Pulse 88   Temp 98.8 °F (37.1 °C) (Temporal)   Resp 16   Ht 5' 8\" (1.727 m)   Wt 89.4 kg (197 lb)   SpO2 99%   BMI 29.95 kg/m²     Physical Exam:  Patient is alert and oriented.  Patient not in " distress.  Vital signs are above.  There is no icterus.  There is no oral thrush.  There is no palpable neck mass.  Lung fields are clear to percussion and auscultation.  Heart rate is regular.  He has a pacemaker/defibrillator.  Liver and spleen are not palpably enlarged.  No other palpable abdominal mass.  No ascites.  There is no edema of the ankles.  There is no calf tenderness.  There is no focal neurological deficit, no skin rash, no palpable lymphadenopathy in the neck and axillary areas, no clubbing.    Patient is anxious.  Performance status 1.    IMAGING:  No orders to display       LABS:    Results for orders placed or performed in visit on 02/20/24   Comprehensive metabolic panel   Result Value Ref Range    Sodium 136 135 - 147 mmol/L    Potassium 5.3 3.5 - 5.3 mmol/L    Chloride 102 96 - 108 mmol/L    CO2 28 21 - 32 mmol/L    ANION GAP 6 mmol/L    BUN 15 5 - 25 mg/dL    Creatinine 1.05 0.60 - 1.30 mg/dL    Glucose, Fasting 101 (H) 65 - 99 mg/dL    Calcium 9.7 8.4 - 10.2 mg/dL    AST 20 13 - 39 U/L    ALT 22 7 - 52 U/L    Alkaline Phosphatase 85 34 - 104 U/L    Total Protein 7.9 6.4 - 8.4 g/dL    Albumin 4.7 3.5 - 5.0 g/dL    Total Bilirubin 0.69 0.20 - 1.00 mg/dL    eGFR 71 ml/min/1.73sq m   Hemoglobin A1C   Result Value Ref Range    Hemoglobin A1C 5.8 (H) Normal 4.0-5.6%; PreDiabetic 5.7-6.4%; Diabetic >=6.5%; Glycemic control for adults with diabetes <7.0% %     mg/dl   Lipid Panel with Direct LDL reflex   Result Value Ref Range    Cholesterol 104 See Comment mg/dL    Triglycerides 116 See Comment mg/dL    HDL, Direct 28 (L) >=40 mg/dL    LDL Calculated 53 0 - 100 mg/dL   TSH, 3rd generation with Free T4 reflex   Result Value Ref Range    TSH 3RD GENERATON 1.547 0.450 - 4.500 uIU/mL   CBC and differential   Result Value Ref Range    WBC 38.99 (H) 4.31 - 10.16 Thousand/uL    RBC 3.63 (L) 3.88 - 5.62 Million/uL    Hemoglobin 12.0 12.0 - 17.0 g/dL    Hematocrit 37.4 36.5 - 49.3 %     (H) 82  - 98 fL    MCH 33.1 26.8 - 34.3 pg    MCHC 32.1 31.4 - 37.4 g/dL    RDW 14.5 11.6 - 15.1 %    MPV 10.8 8.9 - 12.7 fL    Platelets 191 149 - 390 Thousands/uL   IgG, IgA, IgM   Result Value Ref Range     (H) 66 - 433 mg/dL    IGG 1,138 635 - 1,741 mg/dL    IGM 77 45 - 281 mg/dL   LD,Blood   Result Value Ref Range     140 - 271 U/L   Uric acid   Result Value Ref Range    Uric Acid 6.7 3.5 - 8.5 mg/dL   Protein electrophoresis, serum   Result Value Ref Range    A/G Ratio 1.30 1.10 - 1.80    Albumin Electrophoresis 56.6 48.0 - 70.0 %    Albumin CONC 4.30 3.20 - 5.10 g/dl    Alpha 1 3.9 1.8 - 7.0 %    ALPHA 1 CONC 0.30 0.15 - 0.47 g/dL    Alpha 2 10.2 5.9 - 14.9 %    ALPHA 2 CONC 0.78 0.42 - 1.04 g/dL    Beta-1 6.5 4.7 - 7.7 %    BETA 1 CONC 0.49 0.31 - 0.57 g/dL    Beta-2 7.0 3.1 - 7.9 %    BETA 2 CONC 0.53 0.20 - 0.58 g/dL    Gamma Globulin 15.8 6.9 - 22.3 %    GAMMA CONC 1.20 0.40 - 1.66 g/dL    Total Protein 7.6 6.4 - 8.2 g/dL    SPEP Interpretation See Comment    Manual Differential(PHLEBS Do Not Order)   Result Value Ref Range    Segmented % 7 (L) 43 - 75 %    Lymphocytes % 90 (H) 14 - 44 %    Monocytes % 0 (L) 4 - 12 %    Eosinophils, % 0 0 - 6 %    Basophils % 0 0 - 1 %    Atypical Lymphocytes % 3 (H) <=0 %    Absolute Neutrophils 2.73 1.85 - 7.62 Thousand/uL    Lymphocytes Absolute 36.26 (H) 0.60 - 4.47 Thousand/uL    Monocytes Absolute 0.00 0.00 - 1.22 Thousand/uL    Eosinophils Absolute 0.00 0.00 - 0.40 Thousand/uL    Basophils Absolute 0.00 0.00 - 0.10 Thousand/uL    Total Counted      Smudge Cells Present     RBC Morphology Present     Platelet Estimate Adequate Adequate    Differential Comment see note     Anisocytosis Present     Macrocytes Present     Polychromasia Present      *Note: Due to a large number of results and/or encounters for the requested time period, some results have not been displayed. A complete set of results can be found in Results Review.     Labs, Imaging, & Other  studies:   All pertinent labs and imaging studies were personally reviewed    Lab Results   Component Value Date     (L) 07/20/2015    K 5.3 02/20/2024     02/20/2024    CO2 28 02/20/2024    ANIONGAP 9 07/20/2015    BUN 15 02/20/2024    CREATININE 1.05 02/20/2024    GLUCOSE 130 01/10/2021    GLUF 101 (H) 02/20/2024    CALCIUM 9.7 02/20/2024    CORRECTEDCA 9.1 09/28/2022    AST 20 02/20/2024    ALT 22 02/20/2024    ALKPHOS 85 02/20/2024    PROT 8.0 07/20/2015    BILITOT 0.47 07/20/2015    EGFR 71 02/20/2024     Lab Results   Component Value Date    WBC 38.99 (H) 02/20/2024    HGB 12.0 02/20/2024    HCT 37.4 02/20/2024     (H) 02/20/2024     02/20/2024   Lymphocyte 44,300.  ANC 3370.    Reviewed and discussed with patient.  Continuation of care.  Patient is here with his wife.  In 2010 patient was diagnosed to have CLL.  He also had iron deficiency and had GI evaluation and had intravenous Venofer.  He follows with his gastroenterologist.   In May 2021 he was found to have profound autoimmune hemolytic anemia with significant drop in hemoglobin and he was treated with prednisone.  No more hemolysis.  Hemoglobin has been stable.  He has enlarged prostate and nocturia and he follows with his urologist.    He has history of QT prolongation and has a pacemaker/defibrillator and he follows with his cardiologist and electrophysiologist.    He also follows with his lung specialist but exertional dyspnea.    Has tiredness and arthritis and low back discomfort.  He gives history of B12 deficiency and has been taking B12 orally.  Physical examination and test results are as recorded and discussed in detail.  No significant change in CLL parameters.   Plan is surveillance for CLL.  Goal is to monitor patient's condition and CLL.  Patient is capable of self-care.  Diet and activities per patient's primary physician and other consultants especially cardiologists.  All discussed in detail.  Questions  answered.  Discussed health screening tests.  Assessment and plan: See diagnoses, orders and instructions below.  1. CLL (chronic lymphocytic leukemia) (HCC)    - CBC and differential; Future  - Comprehensive metabolic panel; Future  - IgG, IgA, IgM; Future  - LD,Blood; Future  - Uric acid; Future    2. Autoimmune hemolytic anemia (HCC)      3. Paroxysmal A-fib (HCC)      4. Chronic combined systolic and diastolic congestive heart failure (HCC)      5. BPH with obstruction/lower urinary tract symptoms      6. Pulmonary hypertension (HCC)      7. Arthritis      8. Vitamin B 12 deficiency    Blood work prior to next visit in 4 months     .  Patient will continue to follow with  primary physician and other consultants.  Patient  voiced understanding and agrees      Disclaimer: This document was prepared using a dictation device.  If a word or phrase is confusing, or does not make sense, this is likely due to recognition error which was not discovered during the providers review. If you believe an error has occurred, please Contact me through [facility] [facility] Line service for servando?cation.    Counseling / Coordination of Care   .  Provided counseling and support

## 2024-02-28 ENCOUNTER — TELEPHONE (OUTPATIENT)
Age: 74
End: 2024-02-28

## 2024-02-28 NOTE — ASSESSMENT & PLAN NOTE
Patient reportedly drinks at least 5 beers daily  Monitor on CIWA protocol, folate, thiamine [[id number]]

## 2024-02-28 NOTE — TELEPHONE ENCOUNTER
Patient called back and is upset he still hasn't head anything about the diagnosis that was in his chart for Aneurysm of the ascending aorta. He would like a call back to advise.

## 2024-02-29 ENCOUNTER — OFFICE VISIT (OUTPATIENT)
Dept: UROLOGY | Facility: AMBULATORY SURGERY CENTER | Age: 74
End: 2024-02-29
Payer: MEDICARE

## 2024-02-29 ENCOUNTER — TELEPHONE (OUTPATIENT)
Age: 74
End: 2024-02-29

## 2024-02-29 VITALS
OXYGEN SATURATION: 95 % | RESPIRATION RATE: 18 BRPM | HEART RATE: 92 BPM | HEIGHT: 68 IN | WEIGHT: 197 LBS | DIASTOLIC BLOOD PRESSURE: 80 MMHG | BODY MASS INDEX: 29.86 KG/M2 | SYSTOLIC BLOOD PRESSURE: 128 MMHG

## 2024-02-29 DIAGNOSIS — R33.8 BENIGN PROSTATIC HYPERPLASIA WITH URINARY RETENTION: Primary | ICD-10-CM

## 2024-02-29 DIAGNOSIS — N40.1 BENIGN PROSTATIC HYPERPLASIA WITH URINARY RETENTION: Primary | ICD-10-CM

## 2024-02-29 PROCEDURE — 99213 OFFICE O/P EST LOW 20 MIN: CPT | Performed by: UROLOGY

## 2024-02-29 NOTE — PROGRESS NOTES
2/29/2024    Murphy Willams  1950  [id number]        Assessment  History of BPH, status post TURP x 2, status post robot-assisted laparoscopic bladder diverticulectomy, neurogenic bladder, urinary retention, self CIC      Discussion  Today Murphy and I discussed that his kidney function remains normal and that he continues to catheterize without difficulty.  I previously sent him to see infectious disease to provide reassurance that with clean intermittent catheterization he is likely to be continuously colonized and that he does not require antibiotic intervention unless he were to have abdominal pain or fever.  Recommend obtaining a renal and bladder ultrasound to assess his upper tracts.  Assuming that this is within normal limits follow-up can be in 1 year.  His most recent PSA level from July 2023 is 0.15 and we discussed that this can be repeated in 2025 and then potentially discontinued altogether based on AUA guidelines.        History of Present Illness  73 y.o. male with a history of asymptomatic bacteriuria.  He previously underwent TURP x 2.  He then went to [facility] where he underwent a robot-assisted laparoscopic bladder diverticulectomy.  He returns for routine follow-up today.  His creatinine is normal.  His PSA level is 0.15.  He is without complaints.  He states that he is passing his catheters without difficulty.  He presents today for routine follow-up.          AUA Symptom Score      Review of Systems  Review of Systems   Constitutional: Negative.    HENT: Negative.     Eyes: Negative.    Respiratory: Negative.     Cardiovascular: Negative.    Gastrointestinal: Negative.    Endocrine: Negative.    Genitourinary:         Per HPI   Musculoskeletal: Negative.    Skin: Negative.    Allergic/Immunologic: Negative.    Neurological: Negative.    Hematological: Negative.    Psychiatric/Behavioral: Negative.           Past Medical History  Past Medical History:   Diagnosis  Date    Anxiety     BPH (benign prostatic hypertrophy)     Cancer ([facility])     CLL    CLL (chronic lymphocytic leukemia) ([facility])     2009    Colon polyp     Concussion     Resolved: 08/22/16    Depression     Diverticulitis 01/13/2020 2014 CT done 11/19 12/19 CT done     E coli bacteremia 06/27/2021    2/2 blood cultures with Ecoli  Source appears to be the urine     Epididymitis 05/19/2021 5/2021    Gastrointestinal hemorrhage     Last assessed: 08/27/13    GERD (gastroesophageal reflux disease)     H/O vitamin D deficiency 07/07/2021    Hearing loss of aging     Hiatal hernia     History of right hip replacement 04/19/2021    History of transfusion     Hyperlipidemia     Hypertension     Hyponatremia 03/06/2023    Iron deficiency anemia     Microscopic hematuria     Last assessed: 06/28/13    OA (osteoarthritis)     right hip    Obesity (BMI 30.0-34.9) 04/07/2022    Pneumothorax     Primary osteoarthritis of right hip 09/29/2017    He is status post right hip arthroplasty    Prostatitis     Pulmonary hypertension ([facility])     QT prolongation     Seasonal allergies     Sepsis ([facility]) 03/06/2023    Unresponsive episode 03/07/2023    Urinary tract infection associated with catheterization of urinary tract  02/05/2021    Pseudomonal UTI asymptomatic.  Per Urology do not treat at this time.    Urinary tract infection associated with catheterization of urinary tract  02/05/2021    Pseudomonal UTI asymptomatic.  Per Urology do not treat at this time.  He did have urosepsis from E coli 7/21    UTI (urinary tract infection)     in past    Wears glasses        Past Social History  Past Surgical History:   Procedure Laterality Date    ADENOIDECTOMY      BLADDER SURGERY      CARDIAC CATHETERIZATION Left 03/07/2023    Procedure: Cardiac Left Heart Cath;  Surgeon: Rich Ramirez MD;  Location: [facility];  Service: Cardiology    CARDIAC CATHETERIZATION N/A 03/07/2023    Procedure: Cardiac temporary pacemaker;  Surgeon: Rich  "MD James;  Location: [facility];  Service: Cardiology    CARDIAC ELECTROPHYSIOLOGY PROCEDURE N/A 2023    Procedure: Cardiac icd implant;  Surgeon: Filiberto Olsen MD;  Location:  CARDIAC CATH LAB;  Service: Cardiology    COLONOSCOPY      CYSTOSCOPY  10/09/2014    Diagnostic    CYSTOSCOPY  2020    FL CYSTOGRAM  08/15/2022    FRACTURE SURGERY      left lower arm    FRACTURE SURGERY      left femur    HERNIA REPAIR      JOINT REPLACEMENT Right     hip    OTHER SURGICAL HISTORY  2011    Spinal anesthesia epidural    CA ARTHRP ACETBLR/PROX FEM PROSTC AGRFT/ALGRFT Right 2017    Procedure: ARTHROPLASTY HIP TOTAL ANTERIOR;  Surgeon: Roly Liu MD;  Location: [facility];  Service: Orthopedics    TONSILLECTOMY AND ADENOIDECTOMY      TRANSURETHRAL RESECTION OF PROSTATE      x 2    WRIST SURGERY         Past Family History  Family History   Problem Relation Age of Onset    No Known Problems Mother     Heart attack Father     Diabetes Brother     Prostate cancer Brother        Past Social history  Social History     Socioeconomic History    Marital status: /Civil Union     Spouse name: Not on file    Number of children: Not on file    Years of education: Not on file    Highest education level: Not on file   Occupational History    Not on file   Tobacco Use    Smoking status: Former     Current packs/day: 0.00     Types: Cigarettes     Quit date: 1980     Years since quittin.5     Passive exposure: Past    Smokeless tobacco: Never   Vaping Use    Vaping status: Never Used   Substance and Sexual Activity    Alcohol use: Yes     Alcohol/week: 10.0 standard drinks of alcohol     Types: 10 Cans of beer per week     Comment: socially    Drug use: Not Currently     Types: Marijuana     Comment: \"medical marijuana\"    Sexual activity: Not Currently   Other Topics Concern    Not on file   Social History Narrative    Not on file     Social Determinants of Health     Financial Resource " Strain: Low Risk  (10/23/2023)    Overall Financial Resource Strain (CARDIA)     Difficulty of Paying Living Expenses: Not hard at all   Food Insecurity: No Food Insecurity (3/9/2023)    Hunger Vital Sign     Worried About Running Out of Food in the Last Year: Never true     Ran Out of Food in the Last Year: Never true   Transportation Needs: No Transportation Needs (10/23/2023)    PRAPARE - Transportation     Lack of Transportation (Medical): No     Lack of Transportation (Non-Medical): No   Physical Activity: Not on file   Stress: Not on file   Social Connections: Not on file   Intimate Partner Violence: Not on file   Housing Stability: Low Risk  (3/9/2023)    Housing Stability Vital Sign     Unable to Pay for Housing in the Last Year: No     Number of Places Lived in the Last Year: 1     Unstable Housing in the Last Year: No       Current Medications  Current Outpatient Medications   Medication Sig Dispense Refill    Acetaminophen 500 MG Take 1,000 mg by mouth prn      ascorbic acid (VITAMIN C) 250 MG CHEW Chew 250 mg daily      atorvastatin (LIPITOR) 20 mg tablet TAKE ONE TABLET BY MOUTH ONCE EVERY DAY 90 tablet 3    Blood Pressure Monitoring (Omron 5 Series BP Monitor) PEE       busPIRone (BUSPAR) 7.5 mg tablet 1 tablet in the morning and 1 at night 180 tablet 0    dabigatran etexilate (PRADAXA) 150 mg capsu Take 1 capsule (150 mg total) by mouth 2 (two) times a day 180 capsule 2    Lactobacillus (PROBIOTIC ACIDOPHILUS PO) Take by mouth      LORazepam (Ativan) 0.5 mg tablet Take 1 tablet (0.5 mg total) by mouth daily as needed for anxiety 60 tablet 1    losartan (COZAAR) 50 mg tablet Take 1 tablet (50 mg total) by mouth daily 90 tablet 1    montelukast (SINGULAIR) 10 mg tablet TAKE ONE TABLET BY MOUTH DAILY 90 tablet 3    nadolol (CORGARD) 80 MG tablet Take 1 tablet (80 mg total) by mouth daily in the early morning 90 tablet 3    Omega-3 Fatty Acids (FISH OIL) 1,000 mg Take by mouth daily      omeprazole  "(PriLOSEC) 40 MG capsule Take one capsule by mouth once every day. 90 capsule 3    spironolactone (ALDACTONE) 25 mg tablet Take 1 tablet (25 mg total) by mouth daily 90 tablet 1    triamcinolone (KENALOG) 0.1 % ointment Apply twice daily to spot on clavicle until next visit. May stop if resolved prior to that time 15 g 0    zolpidem (AMBIEN) 5 mg tablet Take 1/2-1 full tablet at bedtime as needed for sleep.  Do not take if drinking any alcohol or taking lorazepam 30 tablet 0    docusate sodium (COLACE) 100 mg capsule Take 1 capsule (100 mg total) by mouth 2 (two) times a day 60 capsule 0    vitamin B-12 (CYANOCOBALAMIN) 500 MCG TABS Take 1 tablet (500 mcg total) by mouth daily 30 tablet 0     No current facility-administered medications for this visit.       Allergies  Allergies   Allergen Reactions    Ciprofloxacin Other (See Comments)     LONG QT syndrome    Codeine Other (See Comments)     agitated    Sulfamethoxazole-Trimethoprim GI Intolerance, Abdominal Pain and Other (See Comments)     upset stomach       Past Medical History, Social History, Family History, medications and allergies were reviewed.    Vitals  Vitals:    02/29/24 1106   BP: 128/80   BP Location: Left arm   Patient Position: Sitting   Cuff Size: Standard   Pulse: 92   Resp: 18   SpO2: 95%   Weight: 89.4 kg (197 lb)   Height: 5' 8\" (1.727 m)       Physical Exam  Physical Exam    On examination he is in no acute distress.  Gait normal.  Affect normal    Results  Lab Results   Component Value Date    PSA 0.14 07/26/2023    PSA 0.1 04/18/2023    PSA 0.4 07/29/2021     Lab Results   Component Value Date    GLUCOSE 130 01/10/2021    CALCIUM 9.7 02/20/2024     (L) 07/20/2015    K 5.3 02/20/2024    CO2 28 02/20/2024     02/20/2024    BUN 15 02/20/2024    CREATININE 1.05 02/20/2024     Lab Results   Component Value Date    WBC 38.99 (H) 02/20/2024    HGB 12.0 02/20/2024    HCT 37.4 02/20/2024     (H) 02/20/2024     02/20/2024 "         Office Urine Dip  No results found for this or any previous visit (from the past 1 hour(s)).]    \

## 2024-02-29 NOTE — TELEPHONE ENCOUNTER
Received call from patient asking if Dr Castaneda can call him to discuss the fact that a diagnosis was in his chart for Aneurysm of the ascending aorta and he would like to know where this came from as he has never had this discussed with him previously.   Please advise.

## 2024-03-01 ENCOUNTER — TELEPHONE (OUTPATIENT)
Dept: FAMILY MEDICINE CLINIC | Facility: CLINIC | Age: 74
End: 2024-03-01

## 2024-03-01 ENCOUNTER — TELEPHONE (OUTPATIENT)
Dept: HEMATOLOGY ONCOLOGY | Facility: CLINIC | Age: 74
End: 2024-03-01

## 2024-03-01 NOTE — TELEPHONE ENCOUNTER
Spoke with patient. Reviewed that the lab is not needed.   He vented about the processes of communication. Validated his complaints.

## 2024-03-01 NOTE — TELEPHONE ENCOUNTER
Patient Call    Who are you speaking with? Patient    If it is not the patient, are they listed on an active communication consent form? N/A   What is the reason for this call? Patient calling to speak with Dr. Hanley regarding the     Beta 2 microglobulin, serum   He received a call asking if he wanted to take the lab over because of the results.   Does this require a call back? yes   If a call back is required, please list best call back number [phone]   If a call back is required, advise that a message will be forwarded to their care team and someone will return their call as soon as possible.   Did you relay this information to the patient? Yes

## 2024-03-01 NOTE — TELEPHONE ENCOUNTER
Pt calling in regards to Aneurysm of the ascending aorta, without rupture. Advised this was a diagnosis attached to his labs so it might have been automatically selected as there was no prior history of this problem. Informed PCP was out of office and is currently in meeting. Once I have an updated I or someone else will give him a call to clarify. Pt reports he is worried. His cariology and hematology Providers stated this is not something he has.

## 2024-03-05 ENCOUNTER — TELEPHONE (OUTPATIENT)
Age: 74
End: 2024-03-05

## 2024-03-05 NOTE — TELEPHONE ENCOUNTER
The fax has been received yesterday.  The form was placed in Dr. Castaneda's folder for him to fill out when he returns to the office on 3-6-24.  As soon as Dr. Castaneda completes the form, it'll be faxed back along with pt's last office visit notes.

## 2024-03-05 NOTE — TELEPHONE ENCOUNTER
Kendal from [facility] stating she has faxed over a form for the doctor to sign for this patient in regards to compression stockings.  She will need this form faxed back along with a copy of the last visit notes.  If there are any questions, please contact Kendal at [phone] (direct line).

## 2024-03-06 ENCOUNTER — OFFICE VISIT (OUTPATIENT)
Dept: CARDIOLOGY CLINIC | Facility: CLINIC | Age: 74
End: 2024-03-06
Payer: MEDICARE

## 2024-03-06 VITALS
SYSTOLIC BLOOD PRESSURE: 134 MMHG | HEART RATE: 80 BPM | BODY MASS INDEX: 30.32 KG/M2 | DIASTOLIC BLOOD PRESSURE: 70 MMHG | WEIGHT: 199.4 LBS

## 2024-03-06 DIAGNOSIS — I48.0 PAROXYSMAL A-FIB (HCC): Primary | ICD-10-CM

## 2024-03-06 PROCEDURE — 99214 OFFICE O/P EST MOD 30 MIN: CPT | Performed by: INTERNAL MEDICINE

## 2024-03-06 PROCEDURE — 93000 ELECTROCARDIOGRAM COMPLETE: CPT | Performed by: INTERNAL MEDICINE

## 2024-03-06 NOTE — PROGRESS NOTES
Cardiology             Murphy Willams  1950  [id number]                 Assessment/Plan     Long QT syndrome 2, now on nadolol, status post dual-chamber/biventricular AICD placed 3/8/2023  Paroxysmal atrial fibrillation/flutter  Paroxysmal SVT, status post adenosine 3/6/2023  Torsade de pointes and V-fib episode 3/6/2023  Nonischemic cardiomyopathy, ejection fraction 30%.  Normal coronary angiography 3/7/2023  CLL  History of heavy alcohol use  Dyslipidemia           AV paced rhythm on today's ECG, stable QTc interval, continue nadolol.  Continue following up with electrophysiology for long QT syndrome  Continue Pradaxa anticoagulation  Ejection fraction 45% with prior angiography normal 3/7/2023.  Will continue losartan, spironolactone, nadolol  Continue atorvastatin for dyslipidemia          Follow-up in 6 months          Interval History:      Murphy Willams is a 73 y.o. male with hypertension who has to be on minoxidil in the past.  He also has a history of prolonged QT interval, and has been seeing Dr. Garrison in the past.   He has also been following Dr. Lore robles pulmonary medicine for pulmonary hypertension and sleep apnea.     He was hospitalized with severe COVID-19 pneumonia 01/2021 at which time our group was consulted due to bradycardia and short runs of nonsustained ventricular tachycardia.  He was maintained on metoprolol and QT prolonging agents were avoided.       He presents to the hospital 3/6/2023 after an unresponsive episode at home.  EMS apparently noticed ventricular fibrillation and gave him 1 shock and started CPR.  He was not intubated and presented to the ER lethargic and confused.  He was in a narrow complex regular tachycardia, likely SVT.  He was given adenosine with resolution of his arrhythmia, back into sinus rhythm.  QT interval was initially within normal limits, but subsequently started becoming progressively more prolonged.  In the morning of 3/7/2023 he had torsades with  resultant ventricular fibrillation.  He spontaneously converted back to sinus rhythm and was then started on a lidocaine infusion.  He underwent coronary angiography 3/7/2023 revealing no obstructive CAD.  A temporary pacing wire was placed for recurrent torsades.  He was transferred to [facility] and was thought to have LQT2, with possible torsades precipitated at home due to SSRI and antibiotic use.  He was initiated on nadolol.      He was noted to have paroxysmal atrial fibrillation/flutter and was placed on Xarelto anticoagulation.     Echocardiogram 3/9/2023 revealed left ventricle ejection fraction 30%.  This was thought to be a nonischemic cardiomyopathy possibly due to stress.    On 3/8/2023, he underwent placement of a Medtronic dual-chamber AICD with an RA lead, RV lead, and LV/his lead.  He was evaluated by the heart failure service and initiated on losartan and spironolactone.  He was maintained on nadolol with plans to repeat echocardiogram in 3 months.    Repeat echocardiogram 8/21/2023 demonstrate left ejection fraction 45% with mild biatrial dilatation and mild aortic regurgitation.    He presents today for follow-up with no complaints.  He states he exercises for about 50 minutes daily and feels well.        Vitals:  Vitals:    03/06/24 0818   BP: 134/70   BP Location: Left arm   Patient Position: Sitting   Cuff Size: Adult   Pulse: 80   Weight: 90.4 kg (199 lb 6.4 oz)         Past Medical History:   Diagnosis Date   • Anxiety    • BPH (benign prostatic hypertrophy)    • Cancer (HCC)     CLL   • CLL (chronic lymphocytic leukemia) (HCC)     2009   • Colon polyp    • Concussion     Resolved: 08/22/16   • Depression    • Diverticulitis 01/13/2020 2014 CT done 11/19 12/19 CT done    • E coli bacteremia 06/27/2021 2/2 blood cultures with Ecoli  Source appears to be the urine    • Epididymitis 05/19/2021 5/2021   • Gastrointestinal hemorrhage     Last assessed: 08/27/13   • GERD  "(gastroesophageal reflux disease)    • H/O vitamin D deficiency 2021   • Hearing loss of aging    • Hiatal hernia    • History of right hip replacement 2021   • History of transfusion    • Hyperlipidemia    • Hypertension    • Hyponatremia 2023   • Iron deficiency anemia    • Microscopic hematuria     Last assessed: 13   • OA (osteoarthritis)     right hip   • Obesity (BMI 30.0-34.9) 2022   • Pneumothorax    • Primary osteoarthritis of right hip 2017    He is status post right hip arthroplasty   • Prostatitis    • Pulmonary hypertension (HCC)    • QT prolongation    • Seasonal allergies    • Sepsis (HCC) 2023   • Unresponsive episode 2023   • Urinary tract infection associated with catheterization of urinary tract  2021    Pseudomonal UTI asymptomatic.  Per Urology do not treat at this time.   • Urinary tract infection associated with catheterization of urinary tract  2021    Pseudomonal UTI asymptomatic.  Per Urology do not treat at this time.  He did have urosepsis from E coli    • UTI (urinary tract infection)     in past   • Wears glasses      Social History     Socioeconomic History   • Marital status: /Civil Union     Spouse name: Not on file   • Number of children: Not on file   • Years of education: Not on file   • Highest education level: Not on file   Occupational History   • Not on file   Tobacco Use   • Smoking status: Former     Current packs/day: 0.00     Types: Cigarettes     Quit date: 1980     Years since quittin.5     Passive exposure: Past   • Smokeless tobacco: Never   Vaping Use   • Vaping status: Never Used   Substance and Sexual Activity   • Alcohol use: Yes     Alcohol/week: 10.0 standard drinks of alcohol     Types: 10 Cans of beer per week     Comment: socially   • Drug use: Not Currently     Types: Marijuana     Comment: \"medical marijuana\"   • Sexual activity: Not Currently   Other Topics Concern   • Not on " file   Social History Narrative   • Not on file     Social Determinants of Health     Financial Resource Strain: Low Risk  (10/23/2023)    Overall Financial Resource Strain (CARDIA)    • Difficulty of Paying Living Expenses: Not hard at all   Food Insecurity: No Food Insecurity (3/9/2023)    Hunger Vital Sign    • Worried About Running Out of Food in the Last Year: Never true    • Ran Out of Food in the Last Year: Never true   Transportation Needs: No Transportation Needs (10/23/2023)    PRAPARE - Transportation    • Lack of Transportation (Medical): No    • Lack of Transportation (Non-Medical): No   Physical Activity: Not on file   Stress: Not on file   Social Connections: Not on file   Intimate Partner Violence: Not on file   Housing Stability: Low Risk  (3/9/2023)    Housing Stability Vital Sign    • Unable to Pay for Housing in the Last Year: No    • Number of Places Lived in the Last Year: 1    • Unstable Housing in the Last Year: No      Family History   Problem Relation Age of Onset   • No Known Problems Mother    • Heart attack Father    • Diabetes Brother    • Prostate cancer Brother      Past Surgical History:   Procedure Laterality Date   • ADENOIDECTOMY     • BLADDER SURGERY     • CARDIAC CATHETERIZATION Left 03/07/2023    Procedure: Cardiac Left Heart Cath;  Surgeon: Rich Ramirez MD;  Location: [facility];  Service: Cardiology   • CARDIAC CATHETERIZATION N/A 03/07/2023    Procedure: Cardiac temporary pacemaker;  Surgeon: Rich Ramirez MD;  Location: [facility];  Service: Cardiology   • CARDIAC ELECTROPHYSIOLOGY PROCEDURE N/A 03/08/2023    Procedure: Cardiac icd implant;  Surgeon: Filiberto Olsen MD;  Location: [facility];  Service: Cardiology   • COLONOSCOPY     • CYSTOSCOPY  10/09/2014    Diagnostic   • CYSTOSCOPY  06/29/2020   • FL CYSTOGRAM  08/15/2022   • FRACTURE SURGERY      left lower arm   • FRACTURE SURGERY      left femur   • HERNIA REPAIR     • JOINT REPLACEMENT Right      hip   • OTHER SURGICAL HISTORY  03/2011    Spinal anesthesia epidural   • PA ARTHRP ACETBLR/PROX FEM PROSTC AGRFT/ALGRFT Right 09/29/2017    Procedure: ARTHROPLASTY HIP TOTAL ANTERIOR;  Surgeon: Roly Liu MD;  Location: [facility];  Service: Orthopedics   • TONSILLECTOMY AND ADENOIDECTOMY     • TRANSURETHRAL RESECTION OF PROSTATE      x 2   • WRIST SURGERY         Current Outpatient Medications:   •  Acetaminophen 500 MG, Take 1,000 mg by mouth prn, Disp: , Rfl:   •  ascorbic acid (VITAMIN C) 250 MG CHEW, Chew 250 mg daily, Disp: , Rfl:   •  atorvastatin (LIPITOR) 20 mg tablet, TAKE ONE TABLET BY MOUTH ONCE EVERY DAY, Disp: 90 tablet, Rfl: 3  •  Blood Pressure Monitoring (Omron 5 Series BP Monitor) PEE, , Disp: , Rfl:   •  busPIRone (BUSPAR) 7.5 mg tablet, 1 tablet in the morning and 1 at night, Disp: 180 tablet, Rfl: 0  •  dabigatran etexilate (PRADAXA) 150 mg capsu, Take 1 capsule (150 mg total) by mouth 2 (two) times a day, Disp: 180 capsule, Rfl: 2  •  Lactobacillus (PROBIOTIC ACIDOPHILUS PO), Take by mouth, Disp: , Rfl:   •  LORazepam (Ativan) 0.5 mg tablet, Take 1 tablet (0.5 mg total) by mouth daily as needed for anxiety, Disp: 60 tablet, Rfl: 1  •  losartan (COZAAR) 50 mg tablet, Take 1 tablet (50 mg total) by mouth daily, Disp: 90 tablet, Rfl: 1  •  montelukast (SINGULAIR) 10 mg tablet, TAKE ONE TABLET BY MOUTH DAILY, Disp: 90 tablet, Rfl: 3  •  nadolol (CORGARD) 80 MG tablet, Take 1 tablet (80 mg total) by mouth daily in the early morning, Disp: 90 tablet, Rfl: 3  •  Omega-3 Fatty Acids (FISH OIL) 1,000 mg, Take by mouth daily, Disp: , Rfl:   •  omeprazole (PriLOSEC) 40 MG capsule, Take one capsule by mouth once every day., Disp: 90 capsule, Rfl: 3  •  spironolactone (ALDACTONE) 25 mg tablet, Take 1 tablet (25 mg total) by mouth daily, Disp: 90 tablet, Rfl: 1  •  triamcinolone (KENALOG) 0.1 % ointment, Apply twice daily to spot on clavicle until next visit. May stop if resolved prior to that time,  Disp: 15 g, Rfl: 0  •  vitamin B-12 (CYANOCOBALAMIN) 500 MCG TABS, Take 1 tablet (500 mcg total) by mouth daily, Disp: 30 tablet, Rfl: 0  •  zolpidem (AMBIEN) 5 mg tablet, Take 1/2-1 full tablet at bedtime as needed for sleep.  Do not take if drinking any alcohol or taking lorazepam, Disp: 30 tablet, Rfl: 0  •  docusate sodium (COLACE) 100 mg capsule, Take 1 capsule (100 mg total) by mouth 2 (two) times a day, Disp: 60 capsule, Rfl: 0        Review of Systems:  Review of Systems   Respiratory: Negative.     Cardiovascular: Negative.    All other systems reviewed and are negative.        Physical Exam:  Physical Exam  Constitutional:       General: He is not in acute distress.     Appearance: He is well-developed. He is not diaphoretic.   HENT:      Head: Normocephalic and atraumatic.   Eyes:      General: No scleral icterus.        Right eye: No discharge.      Pupils: Pupils are equal, round, and reactive to light.   Neck:      Thyroid: No thyromegaly.   Cardiovascular:      Rate and Rhythm: Normal rate and regular rhythm.      Heart sounds: Normal heart sounds. No murmur heard.     No friction rub. No gallop.   Pulmonary:      Effort: Pulmonary effort is normal.      Breath sounds: Normal breath sounds.   Abdominal:      General: There is no distension.      Tenderness: There is no abdominal tenderness. There is no guarding or rebound.   Musculoskeletal:         General: Normal range of motion.      Cervical back: Normal range of motion and neck supple.      Right lower leg: No edema.      Left lower leg: No edema.   Skin:     General: Skin is warm and dry.      Coloration: Skin is not pale.      Findings: No erythema or rash.   Neurological:      Mental Status: He is alert and oriented to person, place, and time.      Coordination: Coordination normal.   Psychiatric:         Behavior: Behavior normal.         Thought Content: Thought content normal.         Judgment: Judgment normal.         This note was  completed in part utilizing [employer].  Grammatical errors, random word insertions, spelling mistakes, and incomplete sentences can be an occasional consequence of this system secondary to software limitations, ambient noise, and hardware issues.  If you have any questions or concerns about the content, text, or information contained within the body of this dictation, please contact the provider for clarification.

## 2024-03-11 ENCOUNTER — TELEPHONE (OUTPATIENT)
Dept: FAMILY MEDICINE CLINIC | Facility: CLINIC | Age: 74
End: 2024-03-11

## 2024-03-11 NOTE — TELEPHONE ENCOUNTER
Received the completed paperwork for compression socks from Dr. Castandea.  It has been faxed along with the last office visit notes [employer] at [phone].    Received fax confirmation back 3-11-24 at 11:39 AM

## 2024-03-11 NOTE — TELEPHONE ENCOUNTER
Kendal from [facility] again called in today to check the status of compression stockings form completion from Dr. Castaneda once completed should requested it to be faxed to her along with last office visit note please do help. Thanks

## 2024-03-15 ENCOUNTER — REMOTE DEVICE CLINIC VISIT (OUTPATIENT)
Dept: CARDIOLOGY CLINIC | Facility: CLINIC | Age: 74
End: 2024-03-15
Payer: MEDICARE

## 2024-03-15 DIAGNOSIS — Z95.810 AICD (AUTOMATIC CARDIOVERTER/DEFIBRILLATOR) PRESENT: Primary | ICD-10-CM

## 2024-03-15 PROCEDURE — 93296 REM INTERROG EVL PM/IDS: CPT | Performed by: INTERNAL MEDICINE

## 2024-03-15 PROCEDURE — 93295 DEV INTERROG REMOTE 1/2/MLT: CPT | Performed by: INTERNAL MEDICINE

## 2024-03-15 NOTE — PROGRESS NOTES
"Results for orders placed or performed in visit on 03/15/24   Cardiac EP device report    Narrative    MDT BI-V ICD (DDIR) ACTIVE SYSTEM IS MRI CONDITIONAL  CARELINK TRANSMISSION: BATTERY STATUS \"6 YRS.\" % CRT PACING (BIV) 100% (LV) 0%. ALL AVAILABLE LEAD PARAMETERS WITHIN NORMAL LIMITS. NO SIGNIFICANT HIGH RATE EPISODES. VENT SENSING MARKERS NOTED. OPTI-VOL WITHIN NORMAL LIMITS. NORMAL DEVICE FUNCTION. NC         "
[facility]

## 2024-03-18 DIAGNOSIS — K21.9 GASTROESOPHAGEAL REFLUX DISEASE: ICD-10-CM

## 2024-03-19 RX ORDER — OMEPRAZOLE 40 MG/1
CAPSULE, DELAYED RELEASE ORAL
Qty: 90 CAPSULE | Refills: 1 | Status: SHIPPED | OUTPATIENT
Start: 2024-03-19

## 2024-03-20 DIAGNOSIS — I50.9 NEW ONSET OF CONGESTIVE HEART FAILURE (HCC): ICD-10-CM

## 2024-03-20 RX ORDER — LOSARTAN POTASSIUM 50 MG/1
50 TABLET ORAL DAILY
Qty: 90 TABLET | Refills: 1 | Status: SHIPPED | OUTPATIENT
Start: 2024-03-20

## 2024-03-20 RX ORDER — SPIRONOLACTONE 25 MG/1
25 TABLET ORAL DAILY
Qty: 90 TABLET | Refills: 1 | Status: SHIPPED | OUTPATIENT
Start: 2024-03-20

## 2024-03-21 ENCOUNTER — HOSPITAL ENCOUNTER (OUTPATIENT)
Dept: ULTRASOUND IMAGING | Facility: HOSPITAL | Age: 74
Discharge: HOME | End: 2024-03-21
Attending: UROLOGY
Payer: MEDICARE

## 2024-03-21 DIAGNOSIS — N40.1 BENIGN PROSTATIC HYPERPLASIA WITH URINARY RETENTION: ICD-10-CM

## 2024-03-21 DIAGNOSIS — R33.8 BENIGN PROSTATIC HYPERPLASIA WITH URINARY RETENTION: ICD-10-CM

## 2024-03-21 PROCEDURE — 76775 US EXAM ABDO BACK WALL LIM: CPT

## 2024-03-25 DIAGNOSIS — I50.9 NEW ONSET OF CONGESTIVE HEART FAILURE (HCC): ICD-10-CM

## 2024-03-25 RX ORDER — NADOLOL 80 MG/1
80 TABLET ORAL
Qty: 90 TABLET | Refills: 3 | Status: SHIPPED | OUTPATIENT
Start: 2024-03-25

## 2024-03-26 ENCOUNTER — OFFICE VISIT (OUTPATIENT)
Dept: PULMONOLOGY | Facility: CLINIC | Age: 74
End: 2024-03-26
Payer: MEDICARE

## 2024-03-26 VITALS
HEIGHT: 68 IN | OXYGEN SATURATION: 98 % | WEIGHT: 194.6 LBS | SYSTOLIC BLOOD PRESSURE: 138 MMHG | HEART RATE: 93 BPM | DIASTOLIC BLOOD PRESSURE: 80 MMHG | BODY MASS INDEX: 29.49 KG/M2

## 2024-03-26 DIAGNOSIS — I27.20 PULMONARY HYPERTENSION (HCC): Primary | Chronic | ICD-10-CM

## 2024-03-26 PROBLEM — U07.1 COVID-19: Status: RESOLVED | Noted: 2023-12-29 | Resolved: 2024-03-26

## 2024-03-26 PROCEDURE — 99213 OFFICE O/P EST LOW 20 MIN: CPT | Performed by: INTERNAL MEDICINE

## 2024-03-26 NOTE — PROGRESS NOTES
Progress note - Pulmonary Medicine   Murphy Willams 73 y.o. male MRN: [id number]       Impression & Plan:     Pulmonary hypertension (HCC)  Continues to be stable from a symptom standpoint  Has echocardiogram pending for cardiology but does not have any signs or symptoms of decompensated pulmonary hypertension  He has increased his activity, he is going to the gym regularly, he is trying to maintain his cardiovascular conditioning    Return in about 6 months (around 9/26/2024).      ______________________________________________________________________    HPI:    Murphy Willams presents today for follow-up of pulmonary hypertension.  He continues to do well from a respiratory standpoint.  Reports no increase in shortness of breath.  No worsening leg swelling or signs or symptoms of decompensated heart failure.  He is also followed by Dr. Bueno of cardiology and Dr. Olsen for electrophysiology.  He has an ICD in place.    Denies any other new pulmonary complaints.  No chronic cough, no wheezing.  No progressive shortness of breath.  Denies fever or chills.  No substantial change in weight or appetite.  BMI is 30.  He is exercising regularly at the gym    Current Medications:    Current Outpatient Medications:     Acetaminophen 500 MG, Take 1,000 mg by mouth prn, Disp: , Rfl:     ascorbic acid (VITAMIN C) 250 MG CHEW, Chew 250 mg daily, Disp: , Rfl:     atorvastatin (LIPITOR) 20 mg tablet, TAKE ONE TABLET BY MOUTH ONCE EVERY DAY, Disp: 90 tablet, Rfl: 3    Blood Pressure Monitoring (Omron 5 Series BP Monitor) PEE, , Disp: , Rfl:     busPIRone (BUSPAR) 7.5 mg tablet, 1 tablet in the morning and 1 at night, Disp: 180 tablet, Rfl: 0    dabigatran etexilate (PRADAXA) 150 mg capsu, Take 1 capsule (150 mg total) by mouth 2 (two) times a day, Disp: 180 capsule, Rfl: 2    Lactobacillus (PROBIOTIC ACIDOPHILUS PO), Take by mouth, Disp: , Rfl:     LORazepam (Ativan) 0.5 mg tablet, Take 1 tablet (0.5 mg total) by mouth daily  "as needed for anxiety, Disp: 60 tablet, Rfl: 1    losartan (COZAAR) 50 mg tablet, Take 1 tablet (50 mg total) by mouth daily, Disp: 90 tablet, Rfl: 1    montelukast (SINGULAIR) 10 mg tablet, TAKE ONE TABLET BY MOUTH DAILY, Disp: 90 tablet, Rfl: 3    nadolol (CORGARD) 80 MG tablet, Take 1 tablet (80 mg total) by mouth daily in the early morning, Disp: 90 tablet, Rfl: 3    Omega-3 Fatty Acids (FISH OIL) 1,000 mg, Take by mouth daily, Disp: , Rfl:     omeprazole (PriLOSEC) 40 MG capsule, TAKE ONE CAPSULE BY MOUTH ONCE EVERY DAY, Disp: 90 capsule, Rfl: 1    spironolactone (ALDACTONE) 25 mg tablet, Take 1 tablet (25 mg total) by mouth daily, Disp: 90 tablet, Rfl: 1    triamcinolone (KENALOG) 0.1 % ointment, Apply twice daily to spot on clavicle until next visit. May stop if resolved prior to that time, Disp: 15 g, Rfl: 0    zolpidem (AMBIEN) 5 mg tablet, Take 1/2-1 full tablet at bedtime as needed for sleep.  Do not take if drinking any alcohol or taking lorazepam, Disp: 30 tablet, Rfl: 0    docusate sodium (COLACE) 100 mg capsule, Take 1 capsule (100 mg total) by mouth 2 (two) times a day, Disp: 60 capsule, Rfl: 0    vitamin B-12 (CYANOCOBALAMIN) 500 MCG TABS, Take 1 tablet (500 mcg total) by mouth daily, Disp: 30 tablet, Rfl: 0    Review of Systems:    Aside from what is mentioned in the HPI, the review of systems is otherwise negative    Past medical history, surgical history, and family history were reviewed and updated as appropriate    Social history updates:  Social History     Tobacco Use   Smoking Status Former    Current packs/day: 0.00    Types: Cigarettes    Quit date: 1980    Years since quittin.5    Passive exposure: Past   Smokeless Tobacco Never       PhysicalExamination:  Vitals:   /80 (BP Location: Left arm, Patient Position: Sitting, Cuff Size: Large)   Pulse 93   Ht 5' 8\" (1.727 m)   Wt 88.3 kg (194 lb 9.6 oz)   SpO2 98%   BMI 29.59 kg/m²     Gen:  Comfortable on room air.  No " conversational dyspnea  HEENT:  Conjugate gaze.  sclerae anicteric.  Oropharynx moist  Neck: Trachea is midline. No JVD. No adenopathy  Chest: Symmetric chest wall excursion.  Breath sounds clear bilaterally  Cardiac: Regular. no murmur  Abdomen: Overweight.  Benign  Extremities: Trace edema  Neuro:  Normal speech and mentation    Diagnostic Data:    No new pertinent diagnostic testing    Darrell Torrez MD

## 2024-03-26 NOTE — ASSESSMENT & PLAN NOTE
Continues to be stable from a symptom standpoint  Has echocardiogram pending for cardiology but does not have any signs or symptoms of decompensated pulmonary hypertension  He has increased his activity, he is going to the gym regularly, he is trying to maintain his cardiovascular conditioning

## 2024-04-01 ENCOUNTER — NURSE TRIAGE (OUTPATIENT)
Age: 74
End: 2024-04-01

## 2024-04-01 DIAGNOSIS — R39.9 UTI SYMPTOMS: Primary | ICD-10-CM

## 2024-04-01 NOTE — TELEPHONE ENCOUNTER
Recommend patient get urine testing done.  Last positive urine culture was in July 2023.  We will monitor the results closely and treat accordingly.  I do not recommend ordering antibiotics until we get the urine culture back as he has been resistant to some antibiotics in the past. As far as the flaking on his penis it could either be dry skin or an infection. He could call his PCP to be evaluated or make an appt with us.

## 2024-04-01 NOTE — TELEPHONE ENCOUNTER
"Patient called in because he states his penis \"doesn't feel right\". According to patient and per notes from last office visit he has colonized bacteria. Patient saw infectious disease in the past. Patient denies fever and abdominal pain. Patient isn't sure what to do as he feels a little symptomatic w/ burning in urethra, but states that could be from CIC. Patient would like to discuss with a provider.  Patient also states he feels like the skin on his penis is flaking and isn't sure if that is related.     Patient requesting a call back from a provider.     Best # [phone]  "

## 2024-04-02 ENCOUNTER — APPOINTMENT (OUTPATIENT)
Dept: LAB | Facility: CLINIC | Age: 74
End: 2024-04-02
Payer: MEDICARE

## 2024-04-02 DIAGNOSIS — R39.9 UTI SYMPTOMS: ICD-10-CM

## 2024-04-02 DIAGNOSIS — I50.9 NEW ONSET OF CONGESTIVE HEART FAILURE (HCC): ICD-10-CM

## 2024-04-02 RX ORDER — SPIRONOLACTONE 25 MG/1
25 TABLET ORAL DAILY
Qty: 90 TABLET | Refills: 3 | Status: SHIPPED | OUTPATIENT
Start: 2024-04-02

## 2024-04-02 RX ORDER — LOSARTAN POTASSIUM 50 MG/1
50 TABLET ORAL DAILY
Qty: 90 TABLET | Refills: 3 | Status: SHIPPED | OUTPATIENT
Start: 2024-04-02

## 2024-04-02 NOTE — TELEPHONE ENCOUNTER
Patient called from car, he was walking into office now and wants to be seen. Patient disconnected call.     Office notified.

## 2024-04-02 NOTE — TELEPHONE ENCOUNTER
Patient called in states the nurse explained to him that there are available appointments with the urology provider at [facility]. I checked the schedule, but do not see any availability for this week. Patient states they will check with PCP to see if they can see him sooner, but would also like for the nurse to call him back regarding the appointments she saw available.

## 2024-04-02 NOTE — TELEPHONE ENCOUNTER
"Contacted and spoke with the patient's wife, Mary, letting her know our provider suggested for Murphy to see his PCP for the skin on the penis.    Mary states her  is \"working out right now\" but she will pass the message along.    Urine testing in process.  "

## 2024-04-02 NOTE — TELEPHONE ENCOUNTER
Called Murphy back and he states he wants to see  a nurse today _ I told him that Margaux had an appointment today and he said he couldn't make that appointment,  He wants to stop by for nurse to just look at his penis. Patient can't not have Cipro - he reiterated  it numerous times. He told me that Dr Ren should be the one evaluating him and explained how our office worked. And he told me not to run to Dr Ren.

## 2024-04-02 NOTE — TELEPHONE ENCOUNTER
Pt returned call and is frustrated. States he would like to speak with the office nurse and is requesting to get his penis looked at by us. He doesn't want to go to PCP as he believes we are the ones who should be looking at his penis. Pt states he is busy working out right now and can't speak with anyone. States he will go to the office in hopes someone can take a look at it. Informed pt that there are no openings.

## 2024-04-03 ENCOUNTER — TELEPHONE (OUTPATIENT)
Dept: FAMILY MEDICINE CLINIC | Facility: CLINIC | Age: 74
End: 2024-04-03

## 2024-04-03 NOTE — TELEPHONE ENCOUNTER
Patient called to report he has dryness on penis. Advised patient to apply aquaphor, if problem persists to PCP.    Urine micro results in chart, culture still pending.       Patient will call if further assistance is needed.

## 2024-04-04 ENCOUNTER — OFFICE VISIT (OUTPATIENT)
Dept: FAMILY MEDICINE CLINIC | Facility: CLINIC | Age: 74
End: 2024-04-04
Payer: MEDICARE

## 2024-04-04 ENCOUNTER — TELEPHONE (OUTPATIENT)
Dept: UROLOGY | Facility: AMBULATORY SURGERY CENTER | Age: 74
End: 2024-04-04

## 2024-04-04 VITALS
WEIGHT: 198.6 LBS | HEART RATE: 90 BPM | RESPIRATION RATE: 12 BRPM | BODY MASS INDEX: 30.2 KG/M2 | SYSTOLIC BLOOD PRESSURE: 140 MMHG | OXYGEN SATURATION: 96 % | DIASTOLIC BLOOD PRESSURE: 82 MMHG | TEMPERATURE: 98.5 F

## 2024-04-04 DIAGNOSIS — N39.0 COMPLICATED UTI (URINARY TRACT INFECTION): Primary | ICD-10-CM

## 2024-04-04 DIAGNOSIS — L30.9 DERMATITIS: ICD-10-CM

## 2024-04-04 DIAGNOSIS — M79.672 PAIN IN BOTH FEET: ICD-10-CM

## 2024-04-04 DIAGNOSIS — M79.671 PAIN IN BOTH FEET: ICD-10-CM

## 2024-04-04 PROCEDURE — 99214 OFFICE O/P EST MOD 30 MIN: CPT | Performed by: FAMILY MEDICINE

## 2024-04-04 PROCEDURE — G2211 COMPLEX E/M VISIT ADD ON: HCPCS | Performed by: FAMILY MEDICINE

## 2024-04-04 RX ORDER — NITROFURANTOIN 25; 75 MG/1; MG/1
100 CAPSULE ORAL 2 TIMES DAILY
Qty: 14 CAPSULE | Refills: 0 | Status: SHIPPED | OUTPATIENT
Start: 2024-04-04 | End: 2024-04-11

## 2024-04-04 RX ORDER — CEPHALEXIN 500 MG/1
500 CAPSULE ORAL EVERY 6 HOURS SCHEDULED
Qty: 28 CAPSULE | Refills: 0 | Status: SHIPPED | OUTPATIENT
Start: 2024-04-04 | End: 2024-04-04 | Stop reason: CLARIF

## 2024-04-04 NOTE — PATIENT INSTRUCTIONS
Consider NOORO foot pad for neuropathy - look on amaz[employer]  Can look for other brands as well     No soap or washcloths  Just warm water for bathing  Aquaphor to moisturize  Call if not improving

## 2024-04-04 NOTE — TELEPHONE ENCOUNTER
Called Murphy and let him know that  his abx for his UTI was changed. The sensitivity resulted and it showed that he is resistant to Keflex abx.and that it was sent to [facility].        He state he is NOT to have ABX per Dr Ren unless he has a fever since he does CIC. He wanted to know who prescribed medication as Dr Ren is the only  one who should be prescribing medication. He is at his PCP now as we wouldn't see him at our office- even though he didn't show for [place] on 4/2 in the [place] office. He called paula to cancel ABX

## 2024-04-04 NOTE — PROGRESS NOTES
FAMILY PRACTICE OFFICE VISIT    NAME: Murphy Willams    AGE: 73 y.o.   SEX: male  : 1950   MRN: [id number]    DATE: 2024  TIME: 10:31 AM    Assessment and Plan   1. Pain in both feet  Monofilament testing normal.  Seeing podiatry    Could consider medication - however, caution with # of other meds and h/o Qtc prolongation.        2. Dermatitis  Scrotum  No signs of infection  Suspect secondary to irritants.    Patient Instructions   Consider NOORO foot pad for neuropathy - look on amazon  Can look for other brands as well     No soap or washcloths  Just warm water for bathing  Aquaphor to moisturize  Call if not improving            Chief Complaint     Chief Complaint   Patient presents with    Foot Pain     Bilateral foot pain states like sx of neuropathy burning and tingling. Periodically for a long time this has been going on         History of Present Illness   Murphy Willams is a 73 y.o.-year-old male who presents today for acute concern  - pain in both feet  Ongoing for  a 'long time'   But then the other night pt had a lot of pain at night.    No h/o DM    No new meds.    Drinks plenty of water      Review of Systems   Review of Systems   Respiratory:  Negative for cough, shortness of breath and wheezing.    Cardiovascular:  Negative for chest pain, palpitations and leg swelling.   Musculoskeletal:  Negative for back pain.   Neurological:         Gets tingling and burning sensation in both feet (tops) - noticed at night.  Uses a topical lidocaine cream  Sees podiatry    No known chemical or heavy metal exposure in past     No weakness or numbness in legs    Pt exercises at [employer].    Denies restless legs.         Active Problem List     Patient Active Problem List   Diagnosis    Essential hypertension    CLL (chronic lymphocytic leukemia) (HCC)    Allergic rhinitis due to pollen    Erectile dysfunction of non-organic origin    Gastroesophageal reflux disease without esophagitis    Pulmonary  hypertension (HCC)    Spinal stenosis of lumbar region    Iron deficiency anemia    DDD (degenerative disc disease), cervical    DIXIE not currently treated    Hypercholesterolemia    Depression, recurrent (HCC)    BPH with obstruction/lower urinary tract symptoms    GAVE (gastric antral vascular ectasia)    Autoimmune hemolytic anemia (HCC)    Encysted hydrocele    Diverticulum of bladder    Alcohol use    Ventricular fibrillation (HCC)    Narrow complex tachycardia    Paroxysmal A-fib (HCC)    Long QT syndrome type 2    Chronic combined systolic and diastolic congestive heart failure (HCC)    Polyp of colon    Squamous cell carcinoma of trunk    Arthritis    Vitamin B 12 deficiency         Past Medical History:  Past Medical History:   Diagnosis Date    Anxiety     BPH (benign prostatic hypertrophy)     Cancer (HCC)     CLL    CLL (chronic lymphocytic leukemia) (HCC)     2009    Colon polyp     Concussion     Resolved: 08/22/16    COVID-19 12/29/2023    Depression     Diverticulitis 01/13/2020 2014 CT done 11/19 12/19 CT done     E coli bacteremia 06/27/2021 2/2 blood cultures with Ecoli  Source appears to be the urine     Epididymitis 05/19/2021 5/2021    Gastrointestinal hemorrhage     Last assessed: 08/27/13    GERD (gastroesophageal reflux disease)     H/O vitamin D deficiency 07/07/2021    Hearing loss of aging     Hiatal hernia     History of right hip replacement 04/19/2021    History of transfusion     Hyperlipidemia     Hypertension     Hyponatremia 03/06/2023    Iron deficiency anemia     Microscopic hematuria     Last assessed: 06/28/13    OA (osteoarthritis)     right hip    Obesity (BMI 30.0-34.9) 04/07/2022    Pneumothorax     Primary osteoarthritis of right hip 09/29/2017    He is status post right hip arthroplasty    Prostatitis     Pulmonary hypertension (HCC)     QT prolongation     Seasonal allergies     Sepsis (HCC) 03/06/2023    Unresponsive episode 03/07/2023    Urinary tract infection  associated with catheterization of urinary tract  (HCC) 02/05/2021    Pseudomonal UTI asymptomatic.  Per Urology do not treat at this time.    Urinary tract infection associated with catheterization of urinary tract  ([facility]) 02/05/2021    Pseudomonal UTI asymptomatic.  Per Urology do not treat at this time.  He did have urosepsis from E coli 7/21    UTI (urinary tract infection)     in past    Wears glasses        Past Surgical History:  Past Surgical History:   Procedure Laterality Date    ADENOIDECTOMY      BLADDER SURGERY      CARDIAC CATHETERIZATION Left 03/07/2023    Procedure: Cardiac Left Heart Cath;  Surgeon: Rich Ramirez MD;  Location: [facility];  Service: Cardiology    CARDIAC CATHETERIZATION N/A 03/07/2023    Procedure: Cardiac temporary pacemaker;  Surgeon: Rich Ramirez MD;  Location: [facility];  Service: Cardiology    CARDIAC ELECTROPHYSIOLOGY PROCEDURE N/A 03/08/2023    Procedure: Cardiac icd implant;  Surgeon: Filiberto Olsen MD;  Location: [facility];  Service: Cardiology    COLONOSCOPY      CYSTOSCOPY  10/09/2014    Diagnostic    CYSTOSCOPY  06/29/2020    FL CYSTOGRAM  08/15/2022    FRACTURE SURGERY      left lower arm    FRACTURE SURGERY      left femur    HERNIA REPAIR      JOINT REPLACEMENT Right     hip    OTHER SURGICAL HISTORY  03/2011    Spinal anesthesia epidural    CO ARTHRP ACETBLR/PROX FEM PROSTC AGRFT/ALGRFT Right 09/29/2017    Procedure: ARTHROPLASTY HIP TOTAL ANTERIOR;  Surgeon: Roly Liu MD;  Location: [facility];  Service: Orthopedics    TONSILLECTOMY AND ADENOIDECTOMY      TRANSURETHRAL RESECTION OF PROSTATE      x 2    WRIST SURGERY         Family History:  Family History   Problem Relation Age of Onset    No Known Problems Mother     Heart attack Father     Diabetes Brother     Prostate cancer Brother        Social History:  Social History     Socioeconomic History    Marital status: /Civil Union     Spouse name: Not on file    Number of  "children: Not on file    Years of education: Not on file    Highest education level: Not on file   Occupational History    Not on file   Tobacco Use    Smoking status: Former     Current packs/day: 0.00     Types: Cigarettes     Quit date: 1980     Years since quittin.6     Passive exposure: Past    Smokeless tobacco: Never   Vaping Use    Vaping status: Never Used   Substance and Sexual Activity    Alcohol use: Yes     Alcohol/week: 10.0 standard drinks of alcohol     Types: 10 Cans of beer per week     Comment: socially    Drug use: Not Currently     Types: Marijuana     Comment: \"medical marijuana\"    Sexual activity: Not Currently   Other Topics Concern    Not on file   Social History Narrative    Not on file     Social Determinants of Health     Financial Resource Strain: Low Risk  (10/23/2023)    Overall Financial Resource Strain (CARDIA)     Difficulty of Paying Living Expenses: Not hard at all   Food Insecurity: No Food Insecurity (3/9/2023)    Hunger Vital Sign     Worried About Running Out of Food in the Last Year: Never true     Ran Out of Food in the Last Year: Never true   Transportation Needs: No Transportation Needs (10/23/2023)    PRAPARE - Transportation     Lack of Transportation (Medical): No     Lack of Transportation (Non-Medical): No   Physical Activity: Not on file   Stress: Not on file   Social Connections: Not on file   Intimate Partner Violence: Not on file   Housing Stability: Low Risk  (3/9/2023)    Housing Stability Vital Sign     Unable to Pay for Housing in the Last Year: No     Number of Places Lived in the Last Year: 1     Unstable Housing in the Last Year: No       Objective     Vitals:    24 1016   BP: 140/82   Pulse: 90   Resp: 12   Temp: 98.5 °F (36.9 °C)   SpO2: 96%     Wt Readings from Last 3 Encounters:   24 90.1 kg (198 lb 9.6 oz)   24 88.3 kg (194 lb 9.6 oz)   24 90.4 kg (199 lb 6.4 oz)       Physical Exam  Vitals and nursing note reviewed. "   Constitutional:       General: He is not in acute distress.     Appearance: Normal appearance. He is not ill-appearing or toxic-appearing.   Cardiovascular:      Rate and Rhythm: Normal rate and regular rhythm.      Pulses: Normal pulses. no weak pulses.      Heart sounds: Normal heart sounds. No murmur heard.  Pulmonary:      Effort: Pulmonary effort is normal. No respiratory distress.      Breath sounds: Normal breath sounds. No wheezing, rhonchi or rales.   Genitourinary:     Comments: Increased scrotal erythema and dryness  No skin breakdown  No penile shaft involvement or discharge    Musculoskeletal:      Right lower leg: No edema.      Left lower leg: No edema.   Feet:      Right foot:      Skin integrity: No ulcer, skin breakdown, erythema, warmth, callus or dry skin.      Left foot:      Skin integrity: No ulcer, skin breakdown, erythema, warmth, callus or dry skin.   Neurological:      Mental Status: He is alert.   Psychiatric:         Mood and Affect: Mood normal.         Behavior: Behavior normal.         Thought Content: Thought content normal.         Judgment: Judgment normal.     Diabetic Foot Exam    Patient's shoes and socks removed.    Right Foot/Ankle   Right Foot Inspection  Skin Exam: skin normal and skin intact. No dry skin, no warmth, no callus, no erythema, no maceration, no abnormal color, no pre-ulcer, no ulcer and no callus.     Toe Exam: No swelling, no tenderness, erythema and  no right toe deformity    Sensory   Vibration: intact  Monofilament testing: intact    Left Foot/Ankle  Left Foot Inspection  Skin Exam: skin normal and skin intact. No dry skin, no warmth, no erythema, no maceration, normal color, no pre-ulcer, no ulcer and no callus.     Toe Exam: No swelling, no tenderness, no erythema and no left toe deformity.     Sensory   Vibration: intact  Monofilament testing: intact    Assign Risk Category  No deformity present  No loss of protective sensation  No weak pulses  Risk:  "0        Pertinent Laboratory/Diagnostic Studies:  Lab Results   Component Value Date    GLUCOSE 130 01/10/2021    BUN 15 02/20/2024    CREATININE 1.05 02/20/2024    CALCIUM 9.7 02/20/2024     (L) 07/20/2015    K 5.3 02/20/2024    CO2 28 02/20/2024     02/20/2024     Lab Results   Component Value Date    ALT 22 02/20/2024    AST 20 02/20/2024    ALKPHOS 85 02/20/2024    BILITOT 0.47 07/20/2015       Lab Results   Component Value Date    WBC 38.99 (H) 02/20/2024    HGB 12.0 02/20/2024    HCT 37.4 02/20/2024     (H) 02/20/2024     02/20/2024       No results found for: \"TSH\"    Lab Results   Component Value Date    CHOL 144 01/20/2015     Lab Results   Component Value Date    TRIG 116 02/20/2024     Lab Results   Component Value Date    HDL 28 (L) 02/20/2024     Lab Results   Component Value Date    LDLCALC 53 02/20/2024     Lab Results   Component Value Date    HGBA1C 5.8 (H) 02/20/2024       Results for orders placed or performed in visit on 02/20/24   Comprehensive metabolic panel   Result Value Ref Range    Sodium 136 135 - 147 mmol/L    Potassium 5.3 3.5 - 5.3 mmol/L    Chloride 102 96 - 108 mmol/L    CO2 28 21 - 32 mmol/L    ANION GAP 6 mmol/L    BUN 15 5 - 25 mg/dL    Creatinine 1.05 0.60 - 1.30 mg/dL    Glucose, Fasting 101 (H) 65 - 99 mg/dL    Calcium 9.7 8.4 - 10.2 mg/dL    AST 20 13 - 39 U/L    ALT 22 7 - 52 U/L    Alkaline Phosphatase 85 34 - 104 U/L    Total Protein 7.9 6.4 - 8.4 g/dL    Albumin 4.7 3.5 - 5.0 g/dL    Total Bilirubin 0.69 0.20 - 1.00 mg/dL    eGFR 71 ml/min/1.73sq m   Hemoglobin A1C   Result Value Ref Range    Hemoglobin A1C 5.8 (H) Normal 4.0-5.6%; PreDiabetic 5.7-6.4%; Diabetic >=6.5%; Glycemic control for adults with diabetes <7.0% %     mg/dl   Lipid Panel with Direct LDL reflex   Result Value Ref Range    Cholesterol 104 See Comment mg/dL    Triglycerides 116 See Comment mg/dL    HDL, Direct 28 (L) >=40 mg/dL    LDL Calculated 53 0 - 100 mg/dL   TSH, " 3rd generation with Free T4 reflex   Result Value Ref Range    TSH 3RD GENERATON 1.547 0.450 - 4.500 uIU/mL   CBC and differential   Result Value Ref Range    WBC 38.99 (H) 4.31 - 10.16 Thousand/uL    RBC 3.63 (L) 3.88 - 5.62 Million/uL    Hemoglobin 12.0 12.0 - 17.0 g/dL    Hematocrit 37.4 36.5 - 49.3 %     (H) 82 - 98 fL    MCH 33.1 26.8 - 34.3 pg    MCHC 32.1 31.4 - 37.4 g/dL    RDW 14.5 11.6 - 15.1 %    MPV 10.8 8.9 - 12.7 fL    Platelets 191 149 - 390 Thousands/uL   IgG, IgA, IgM   Result Value Ref Range     (H) 66 - 433 mg/dL    IGG 1,138 635 - 1,741 mg/dL    IGM 77 45 - 281 mg/dL   LD,Blood   Result Value Ref Range     140 - 271 U/L   Uric acid   Result Value Ref Range    Uric Acid 6.7 3.5 - 8.5 mg/dL   Protein electrophoresis, serum   Result Value Ref Range    A/G Ratio 1.30 1.10 - 1.80    Albumin Electrophoresis 56.6 48.0 - 70.0 %    Albumin CONC 4.30 3.20 - 5.10 g/dl    Alpha 1 3.9 1.8 - 7.0 %    ALPHA 1 CONC 0.30 0.15 - 0.47 g/dL    Alpha 2 10.2 5.9 - 14.9 %    ALPHA 2 CONC 0.78 0.42 - 1.04 g/dL    Beta-1 6.5 4.7 - 7.7 %    BETA 1 CONC 0.49 0.31 - 0.57 g/dL    Beta-2 7.0 3.1 - 7.9 %    BETA 2 CONC 0.53 0.20 - 0.58 g/dL    Gamma Globulin 15.8 6.9 - 22.3 %    GAMMA CONC 1.20 0.40 - 1.66 g/dL    Total Protein 7.6 6.4 - 8.2 g/dL    SPEP Interpretation See Comment    Manual Differential(PHLEBS Do Not Order)   Result Value Ref Range    Segmented % 7 (L) 43 - 75 %    Lymphocytes % 90 (H) 14 - 44 %    Monocytes % 0 (L) 4 - 12 %    Eosinophils % 0 0 - 6 %    Basophils % 0 0 - 1 %    Atypical Lymphocytes % 3 (H) <=0 %    Absolute Neutrophils 2.73 1.85 - 7.62 Thousand/uL    Absolute Lymphocytes 36.26 (H) 0.60 - 4.47 Thousand/uL    Absolute Monocytes 0.00 0.00 - 1.22 Thousand/uL    Absolute Eosinophils 0.00 0.00 - 0.40 Thousand/uL    Absolute Basophils 0.00 0.00 - 0.10 Thousand/uL    Total Counted      Smudge Cells Present     RBC Morphology Present     Platelet Estimate Adequate Adequate     Differential Comment see note     Anisocytosis Present     Macrocytes Present     Polychromasia Present      *Note: Due to a large number of results and/or encounters for the requested time period, some results have not been displayed. A complete set of results can be found in Results Review.       No orders of the defined types were placed in this encounter.      ALLERGIES:  Allergies   Allergen Reactions    Ciprofloxacin Other (See Comments)     LONG QT syndrome    Codeine Other (See Comments)     agitated    Sulfamethoxazole-Trimethoprim GI Intolerance, Abdominal Pain and Other (See Comments)     upset stomach       Current Medications     Current Outpatient Medications   Medication Sig Dispense Refill    Acetaminophen 500 MG Take 1,000 mg by mouth prn      ascorbic acid (VITAMIN C) 250 MG CHEW Chew 250 mg daily      atorvastatin (LIPITOR) 20 mg tablet TAKE ONE TABLET BY MOUTH ONCE EVERY DAY 90 tablet 3    Blood Pressure Monitoring (Omron 5 Series BP Monitor) PEE       busPIRone (BUSPAR) 7.5 mg tablet 1 tablet in the morning and 1 at night 180 tablet 0    dabigatran etexilate (PRADAXA) 150 mg capsu Take 1 capsule (150 mg total) by mouth 2 (two) times a day 180 capsule 2    docusate sodium (COLACE) 100 mg capsule Take 1 capsule (100 mg total) by mouth 2 (two) times a day 60 capsule 0    Lactobacillus (PROBIOTIC ACIDOPHILUS PO) Take by mouth      LORazepam (Ativan) 0.5 mg tablet Take 1 tablet (0.5 mg total) by mouth daily as needed for anxiety 60 tablet 1    losartan (COZAAR) 50 mg tablet Take 1 tablet (50 mg total) by mouth daily 90 tablet 3    montelukast (SINGULAIR) 10 mg tablet TAKE ONE TABLET BY MOUTH DAILY 90 tablet 3    nadolol (CORGARD) 80 MG tablet Take 1 tablet (80 mg total) by mouth daily in the early morning 90 tablet 3    nitrofurantoin (MACROBID) 100 mg capsule Take 1 capsule (100 mg total) by mouth 2 (two) times a day for 7 days 14 capsule 0    Omega-3 Fatty Acids (FISH OIL) 1,000 mg Take by mouth  daily      omeprazole (PriLOSEC) 40 MG capsule TAKE ONE CAPSULE BY MOUTH ONCE EVERY DAY 90 capsule 1    spironolactone (ALDACTONE) 25 mg tablet Take 1 tablet (25 mg total) by mouth daily 90 tablet 3    triamcinolone (KENALOG) 0.1 % ointment Apply twice daily to spot on clavicle until next visit. May stop if resolved prior to that time 15 g 0    vitamin B-12 (CYANOCOBALAMIN) 500 MCG TABS Take 1 tablet (500 mcg total) by mouth daily 30 tablet 0    zolpidem (AMBIEN) 5 mg tablet Take 1/2-1 full tablet at bedtime as needed for sleep.  Do not take if drinking any alcohol or taking lorazepam 30 tablet 0     No current facility-administered medications for this visit.         Health Maintenance     Health Maintenance   Topic Date Due    COVID-19 Vaccine (5 - 2023-24 season) 12/04/2023    Fall Risk  10/23/2024    Depression Screening  10/23/2024    Medicare Annual Wellness Visit (AWV)  10/23/2024    Colorectal Cancer Screening  10/04/2026    Hepatitis C Screening  Completed    Zoster Vaccine  Completed    Pneumococcal Vaccine: 65+ Years  Completed    Influenza Vaccine  Completed    HIB Vaccine  Aged Out    IPV Vaccine  Aged Out    Hepatitis A Vaccine  Aged Out    Meningococcal ACWY Vaccine  Aged Out    HPV Vaccine  Aged Out     Immunization History   Administered Date(s) Administered    COVID-19 MODERNA VACC 0.25 ML IM BOOSTER 11/16/2021    COVID-19 MODERNA VACC 0.5 ML IM 04/06/2021, 05/07/2021, 05/31/2022    COVID-19 Moderna Vac BIVALENT 12 Yr+ IM 0.5 ML 11/08/2022    COVID-19 Moderna mRNA Vaccine 12 Yr+ 50 mcg/0.5 mL (Spikevax) 10/09/2023    INFLUENZA 09/21/2015, 10/14/2016, 10/17/2022, 10/09/2023    Influenza Quadrivalent Preservative Free 3 years and older IM 10/14/2016, 09/15/2017    Influenza Split Preservative Free ID 09/10/2013    Influenza, high dose seasonal 0.7 mL 10/01/2018, 10/15/2019, 09/18/2020, 09/29/2021, 10/17/2022    Influenza, seasonal, injectable 08/31/2012, 09/23/2014    Pneumococcal Conjugate 13-Valent  10/14/2016    Pneumococcal Polysaccharide PPV23 09/10/2013, 08/30/2019    Respiratory Syncytial Virus Vaccine (Recombinant) 09/22/2023    Tdap 08/22/2016    Zoster 04/29/2015, 08/20/2018    Zoster Vaccine Recombinant 08/20/2018, 10/19/2018          Shauna Figueroa DO

## 2024-04-04 NOTE — TELEPHONE ENCOUNTER
JOVANNA Velasco and let him know that his UC came back positive and that an abx to his [facility]'s pharmacy for seven days. He is to call back if he has questions

## 2024-04-04 NOTE — TELEPHONE ENCOUNTER
Please call Murphy and let him know that I had to change his abx for his UTI. The sensitivity resulted and it showed that he is resistant to Keflex abx. I sent the new abx to [facility].

## 2024-04-04 NOTE — TELEPHONE ENCOUNTER
Please call Murphy and let him know that his UC came back positive and I have sent an abx to his [facility]'s pharmacy for seven days.

## 2024-04-05 DIAGNOSIS — R39.9 UTI SYMPTOMS: Primary | ICD-10-CM

## 2024-04-05 RX ORDER — CEFUROXIME AXETIL 250 MG/1
250 TABLET ORAL EVERY 12 HOURS SCHEDULED
Qty: 10 TABLET | Refills: 0 | Status: SHIPPED | OUTPATIENT
Start: 2024-04-05 | End: 2024-04-10

## 2024-04-05 NOTE — TELEPHONE ENCOUNTER
Called and spoke to Murphy he states that he wanted to start a different ABX. I He states that he was feeling better - but took the Macrobid  and now he has fatigue,tired,-dizziness He states that he had issues before with Macrobid and he wants it to be put on his list not to use. He is very paranoid about different medications.Since its the weekend - he would like a different medication sent to [facility]'s.  He states he may not use but wants to have in case any episode before for positive infection and had to get hospitalized (June 2023) since he had many UTI's

## 2024-04-05 NOTE — TELEPHONE ENCOUNTER
Germania from [facility]'s pharmacy called to inform us that patient called them to report since starting the Macrobid, he is having nausea, dizziness and chills. Germania inquiring if another antibiotic can be sent over for patient. If any questions please call [facility]'s at [phone] and ask for either Germania or Daisy

## 2024-04-08 DIAGNOSIS — F51.01 PRIMARY INSOMNIA: ICD-10-CM

## 2024-04-08 RX ORDER — ZOLPIDEM TARTRATE 5 MG/1
TABLET ORAL
Qty: 30 TABLET | Refills: 0 | Status: SHIPPED | OUTPATIENT
Start: 2024-04-08

## 2024-04-09 NOTE — TELEPHONE ENCOUNTER
"Patient has one day of antibiotics left and says \"he's just not feeling well down there\". Wants to only see Dr. Ren. He asked to be called after 11am because he was on his way out to the gym.    CB: [phone]  "

## 2024-04-10 ENCOUNTER — TELEPHONE (OUTPATIENT)
Age: 74
End: 2024-04-10

## 2024-04-10 NOTE — TELEPHONE ENCOUNTER
Patient called asking what soap he can use because his skin is dry , I advised to use dove soap (the classic one ) and use 2-3 times a day OTC  cerave , cetaphil or eucerin and frangance free products   Aptirnt does not have more questions for now

## 2024-04-12 ENCOUNTER — TELEPHONE (OUTPATIENT)
Age: 74
End: 2024-04-12

## 2024-04-12 NOTE — TELEPHONE ENCOUNTER
Patient called, said he accidentally took an extra dose of nadolol 80 mg last night.  He said he first called the pharmacist to discuss and pharmacist wasn't concerned. He wants to make sure it's ok to resume the Nadolol today as regularly scheduled.    His HR is 80 today, he did not feel lightheaded or dizzy last night with the extra dose.  He feels fine today.  He does have a [employer] device.      I advised him to resume taking the Nadolol today as he usually does.  He planned to do so and just wanted confirmation.

## 2024-04-22 NOTE — PROGRESS NOTES
4/24/2024    Assessment and Plan    73 y.o. male managed by Dr. Ren     1. BPH  S/p TURP x2  CIC passing well - emptying completely  No new issues, continue to monitor     2. Neurogenic Bladder   3. Urinary Retention   Currently CIC 4-5x daily   Ultrasound kidney/bladder 3/21/24 -- unremarkable   Patients urine cultures are chronically colonized - no need to treat unless new or worsening symptoms from his baseline   Feels well - continue to monitor     4. Prostate Cancer Screening  Most recent PSA from July 2023 was 0.15  Lab Results   Component Value Date    PSA 0.14 07/26/2023    PSA 0.1 04/18/2023    PSA 0.4 07/29/2021     Repeat PSA in 2025, if it remains stable potentially discontinue altogether based on AUA guidelines    5. Testicular Pain/Burning   Noticed testicular pain/burning + dry flaky skin on the scrotum and shaft of penis a few weeks ago   Clean the area with sensitive skin plus fragrance free soap daily  Apply Aquaphor to the area  Return to office with any new onset of symptoms/concerns    History of Present Illness  Murphy Willams is a 73 y.o. male here for evaluation of BPH, neurogenic bladder, urinary retention, and recent testicular pain/burning.  Patient has been followed by Dr. Ren for multiple years.  He has undergone 2 TURPs in the past for ongoing BPH leading to urinary retention.  Patient also underwent robotic diverticulectomy of the bladder on 8/3/2022 -- with the hopes of correcting his neurogenic bladder. Unfortunately the patient denies any changes in his urinary pattern postoperatively.  Patient continues to CIC 4-5 times daily.  Recent ultrasound of the kidneys and bladder on 3/21/2024 was unremarkable.  Patient reports that due to CIC his urine cultures are chronically colonized.  Most recent urine culture from a few weeks ago came back positive for E. coli.  He has met with both Dr. Ren and infectious disease on multiple cases, they are in agreement that he should  "not be placed on daily suppressive antibiotics at this time.  Patient currently denies symptoms of UTI including dysuria, hematuria, urinary frequency/urgency, fever/chills, or pain in the bladder/bilateral flanks.  Patient educated on signs of infection with chronic colonization, he vocalizes understanding.     Patient states he recently noticed dry skin on both the shaft of his penis and on his scrotum.  His scrotum was itchy and there was white flaky material coming off of it.  Patient educated to utilize fragrance free hypoallergenic soap on the area daily.  Apply Vaseline/Aquaphor to the area for increased moisture.  He reports using Vaseline for the past few days and has already noticed improvement.  On physical exam both the penis and the scrotum are unremarkable.  Some erythema is noted on the scrotum from recent scratching, but there is no signs of bacterial/fungal infection at this time.  No need for further intervention at this time, follow-up with Dr. Domingo annually as scheduled.      Review of Systems   Constitutional:  Negative for activity change, chills, fatigue and fever.   Respiratory:  Negative for apnea, cough and shortness of breath.    Cardiovascular:  Negative for chest pain and leg swelling.   Gastrointestinal:  Negative for abdominal distention, abdominal pain, constipation, diarrhea, nausea and vomiting.   Genitourinary:  Positive for difficulty urinating (CIC 4-5x daily) and testicular pain (burning/itching). Negative for decreased urine volume, dysuria, flank pain, frequency, hematuria, penile pain and urgency.   Neurological:  Negative for dizziness and headaches.   Psychiatric/Behavioral: Negative.       Vitals  Vitals:    04/24/24 1032   BP: 120/70   BP Location: Left arm   Patient Position: Sitting   Cuff Size: Standard   Pulse: 76   SpO2: 98%   Weight: 88.9 kg (196 lb)   Height: 5' 8\" (1.727 m)       Physical Exam  Vitals and nursing note reviewed.   Constitutional:       " Appearance: Normal appearance.   HENT:      Head: Normocephalic and atraumatic.      Mouth/Throat:      Pharynx: Oropharynx is clear.   Eyes:      Extraocular Movements: Extraocular movements intact.      Conjunctiva/sclera: Conjunctivae normal.   Cardiovascular:      Rate and Rhythm: Normal rate.   Pulmonary:      Effort: Pulmonary effort is normal.   Abdominal:      General: Abdomen is flat. There is no distension.      Palpations: Abdomen is soft.      Tenderness: There is no abdominal tenderness. There is no right CVA tenderness or left CVA tenderness.   Genitourinary:     Penis: Normal.       Testes: Normal.      Comments: Circumcised penis, normal phallus, orthotopic patent meatus.  Testes smooth descended bilaterally into the scrotum nontender with no palpable mass or swelling.  Mild erythema noted on scrotum secondary to itching. No erythema or discharge noted.       Musculoskeletal:         General: Normal range of motion.      Cervical back: Normal range of motion.   Skin:     General: Skin is warm and dry.   Neurological:      General: No focal deficit present.      Mental Status: He is alert and oriented to person, place, and time.   Psychiatric:         Mood and Affect: Mood normal.         Behavior: Behavior normal.           Past History  Past Medical History:   Diagnosis Date    Anxiety     BPH (benign prostatic hypertrophy)     Cancer (HCC)     CLL    CLL (chronic lymphocytic leukemia) ([facility])     2009    Colon polyp     Concussion     Resolved: 08/22/16    COVID-19 12/29/2023    Depression     Diverticulitis 01/13/2020 2014 CT done 11/19 12/19 CT done     E coli bacteremia 06/27/2021    2/2 blood cultures with Ecoli  Source appears to be the urine     Epididymitis 05/19/2021 5/2021    Gastrointestinal hemorrhage     Last assessed: 08/27/13    GERD (gastroesophageal reflux disease)     H/O vitamin D deficiency 07/07/2021    Hearing loss of aging     Hiatal hernia     History of right hip  "replacement 2021    History of transfusion     Hyperlipidemia     Hypertension     Hyponatremia 2023    Iron deficiency anemia     Microscopic hematuria     Last assessed: 13    OA (osteoarthritis)     right hip    Obesity (BMI 30.0-34.9) 2022    Pneumothorax     Primary osteoarthritis of right hip 2017    He is status post right hip arthroplasty    Prostatitis     Pulmonary hypertension (HCC)     QT prolongation     Seasonal allergies     Sepsis ([facility]) 2023    Unresponsive episode 2023    Urinary tract infection associated with catheterization of urinary tract  ([facility]) 2021    Pseudomonal UTI asymptomatic.  Per Urology do not treat at this time.    Urinary tract infection associated with catheterization of urinary tract  ([facility]) 2021    Pseudomonal UTI asymptomatic.  Per Urology do not treat at this time.  He did have urosepsis from E coli     UTI (urinary tract infection)     in past    Wears glasses      Social History     Socioeconomic History    Marital status: /Civil Union     Spouse name: None    Number of children: None    Years of education: None    Highest education level: None   Occupational History    None   Tobacco Use    Smoking status: Former     Current packs/day: 0.00     Types: Cigarettes     Quit date: 1980     Years since quittin.6     Passive exposure: Past    Smokeless tobacco: Never   Vaping Use    Vaping status: Never Used   Substance and Sexual Activity    Alcohol use: Yes     Alcohol/week: 10.0 standard drinks of alcohol     Types: 10 Cans of beer per week     Comment: socially    Drug use: Not Currently     Types: Marijuana     Comment: \"medical marijuana\"    Sexual activity: Not Currently   Other Topics Concern    None   Social History Narrative    None     Social Determinants of Health     Financial Resource Strain: Low Risk  (10/23/2023)    Overall Financial Resource Strain (CARDIA)     Difficulty of Paying Living " Expenses: Not hard at all   Food Insecurity: No Food Insecurity (3/9/2023)    Hunger Vital Sign     Worried About Running Out of Food in the Last Year: Never true     Ran Out of Food in the Last Year: Never true   Transportation Needs: No Transportation Needs (10/23/2023)    PRAPARE - Transportation     Lack of Transportation (Medical): No     Lack of Transportation (Non-Medical): No   Physical Activity: Not on file   Stress: Not on file   Social Connections: Not on file   Intimate Partner Violence: Not on file   Housing Stability: Low Risk  (3/9/2023)    Housing Stability Vital Sign     Unable to Pay for Housing in the Last Year: No     Number of Places Lived in the Last Year: 1     Unstable Housing in the Last Year: No     Social History     Tobacco Use   Smoking Status Former    Current packs/day: 0.00    Types: Cigarettes    Quit date: 1980    Years since quittin.6    Passive exposure: Past   Smokeless Tobacco Never     Family History   Problem Relation Age of Onset    No Known Problems Mother     Heart attack Father     Diabetes Brother     Prostate cancer Brother        The following portions of the patient's history were reviewed and updated as appropriate: allergies, current medications, past medical history, past social history, past surgical history and problem list.    Results  No results found for this or any previous visit (from the past 1 hour(s)).]  Lab Results   Component Value Date    PSA 0.14 2023    PSA 0.1 2023    PSA 0.4 2021    PSA 0.4 2021     Lab Results   Component Value Date    GLUCOSE 130 01/10/2021    CALCIUM 9.7 2024     (L) 2015    K 5.3 2024    CO2 28 2024     2024    BUN 15 2024    CREATININE 1.05 2024     Lab Results   Component Value Date    WBC 38.99 (H) 2024    HGB 12.0 2024    HCT 37.4 2024     (H) 2024     2024       Rebecca Urias PA-C

## 2024-04-24 ENCOUNTER — OFFICE VISIT (OUTPATIENT)
Dept: UROLOGY | Facility: MEDICAL CENTER | Age: 74
End: 2024-04-24
Payer: MEDICARE

## 2024-04-24 VITALS
WEIGHT: 196 LBS | SYSTOLIC BLOOD PRESSURE: 120 MMHG | OXYGEN SATURATION: 98 % | DIASTOLIC BLOOD PRESSURE: 70 MMHG | HEART RATE: 76 BPM | HEIGHT: 68 IN | BODY MASS INDEX: 29.7 KG/M2

## 2024-04-24 DIAGNOSIS — N40.1 BENIGN PROSTATIC HYPERPLASIA WITH URINARY RETENTION: Primary | ICD-10-CM

## 2024-04-24 DIAGNOSIS — R33.8 BENIGN PROSTATIC HYPERPLASIA WITH URINARY RETENTION: Primary | ICD-10-CM

## 2024-04-24 DIAGNOSIS — Z12.5 PROSTATE CANCER SCREENING: ICD-10-CM

## 2024-04-24 PROCEDURE — 99214 OFFICE O/P EST MOD 30 MIN: CPT

## 2024-05-03 ENCOUNTER — TELEPHONE (OUTPATIENT)
Age: 74
End: 2024-05-03

## 2024-05-03 NOTE — TELEPHONE ENCOUNTER
Patient called for moisturizer recommendations for dry itchy skin. Patient says he's switched soaps from [employer] to Dove and detergents from [employer]. We discussed moisturizers such as Cetaphil and Vanicream. Advised that anything in a tub versus a pump will probably benefit his skin better as tub moisturizers are known to be thicker in consistency.     If no improvement, patient was instructed to bring up concerns during his next visit to further evaluate and treat.     Patient very grateful and pleased with recommendations.

## 2024-05-14 ENCOUNTER — TELEPHONE (OUTPATIENT)
Dept: PULMONOLOGY | Facility: CLINIC | Age: 74
End: 2024-05-14

## 2024-05-28 ENCOUNTER — TELEPHONE (OUTPATIENT)
Dept: HEMATOLOGY ONCOLOGY | Facility: CLINIC | Age: 74
End: 2024-05-28

## 2024-05-28 NOTE — TELEPHONE ENCOUNTER
Patient Call    Who are you speaking with? Patient    If it is not the patient, are they listed on an active communication consent form? N/A   What is the reason for this call? Pt will get labs this Thursday. He asked if it was too soon   Does this require a call back? Yes   If a call back is required, please list best call back number [phone]    If a call back is required, advise that a message will be forwarded to their care team and someone will return their call as soon as possible.   Did you relay this information to the patient? Yes

## 2024-05-29 ENCOUNTER — RA CDI HCC (OUTPATIENT)
Dept: OTHER | Facility: HOSPITAL | Age: 74
End: 2024-05-29

## 2024-05-29 ENCOUNTER — TELEPHONE (OUTPATIENT)
Dept: HEMATOLOGY ONCOLOGY | Facility: CLINIC | Age: 74
End: 2024-05-29

## 2024-05-29 NOTE — TELEPHONE ENCOUNTER
Spoke to pt and informed him that it is ok for him to have bloodwork done on Thursday 5/30. Pt then stated he will be going Friday 5/31 instead.

## 2024-05-31 ENCOUNTER — TELEPHONE (OUTPATIENT)
Dept: HEMATOLOGY ONCOLOGY | Facility: CLINIC | Age: 74
End: 2024-05-31

## 2024-05-31 ENCOUNTER — APPOINTMENT (OUTPATIENT)
Dept: LAB | Facility: MEDICAL CENTER | Age: 74
End: 2024-05-31
Payer: MEDICARE

## 2024-05-31 DIAGNOSIS — R73.9 HYPERGLYCEMIA: ICD-10-CM

## 2024-05-31 DIAGNOSIS — C91.10 CLL (CHRONIC LYMPHOCYTIC LEUKEMIA) (HCC): ICD-10-CM

## 2024-05-31 DIAGNOSIS — I20.89 ANGINA OF EFFORT: ICD-10-CM

## 2024-05-31 DIAGNOSIS — E78.00 HYPERCHOLESTEROLEMIA: Chronic | ICD-10-CM

## 2024-05-31 DIAGNOSIS — D59.10 AUTOIMMUNE HEMOLYTIC ANEMIA (HCC): ICD-10-CM

## 2024-05-31 DIAGNOSIS — I71.21 ANEURYSM OF THE ASCENDING AORTA, WITHOUT RUPTURE (HCC): ICD-10-CM

## 2024-05-31 DIAGNOSIS — F33.9 DEPRESSION, RECURRENT (HCC): ICD-10-CM

## 2024-05-31 DIAGNOSIS — I49.01 VENTRICULAR FIBRILLATION (HCC): ICD-10-CM

## 2024-05-31 DIAGNOSIS — I27.20 PULMONARY HYPERTENSION (HCC): ICD-10-CM

## 2024-05-31 DIAGNOSIS — I48.0 PAROXYSMAL A-FIB (HCC): ICD-10-CM

## 2024-05-31 DIAGNOSIS — C91.10 CLL (CHRONIC LYMPHOCYTIC LEUKEMIA) (HCC): Primary | ICD-10-CM

## 2024-05-31 LAB
ALBUMIN SERPL BCP-MCNC: 4.7 G/DL (ref 3.5–5)
ALP SERPL-CCNC: 72 U/L (ref 34–104)
ALT SERPL W P-5'-P-CCNC: 20 U/L (ref 7–52)
ANION GAP SERPL CALCULATED.3IONS-SCNC: 10 MMOL/L (ref 4–13)
ANISOCYTOSIS BLD QL SMEAR: PRESENT
AST SERPL W P-5'-P-CCNC: 19 U/L (ref 13–39)
BASOPHILS # BLD MANUAL: 0 THOUSAND/UL (ref 0–0.1)
BASOPHILS NFR MAR MANUAL: 0 % (ref 0–1)
BILIRUB SERPL-MCNC: 0.61 MG/DL (ref 0.2–1)
BUN SERPL-MCNC: 13 MG/DL (ref 5–25)
CALCIUM SERPL-MCNC: 9.2 MG/DL (ref 8.4–10.2)
CHLORIDE SERPL-SCNC: 98 MMOL/L (ref 96–108)
CHOLEST SERPL-MCNC: 105 MG/DL
CO2 SERPL-SCNC: 26 MMOL/L (ref 21–32)
CREAT SERPL-MCNC: 0.88 MG/DL (ref 0.6–1.3)
DIFFERENTIAL COMMENT: ABNORMAL
EOSINOPHIL # BLD MANUAL: 0.51 THOUSAND/UL (ref 0–0.4)
EOSINOPHIL NFR BLD MANUAL: 1 % (ref 0–6)
ERYTHROCYTE [DISTWIDTH] IN BLOOD BY AUTOMATED COUNT: 14.9 % (ref 11.6–15.1)
EST. AVERAGE GLUCOSE BLD GHB EST-MCNC: 111 MG/DL
GFR SERPL CREATININE-BSD FRML MDRD: 86 ML/MIN/1.73SQ M
GLUCOSE P FAST SERPL-MCNC: 110 MG/DL (ref 65–99)
HBA1C MFR BLD: 5.5 %
HCT VFR BLD AUTO: 37.9 % (ref 36.5–49.3)
HDLC SERPL-MCNC: 29 MG/DL
HGB BLD-MCNC: 12.2 G/DL (ref 12–17)
IGA SERPL-MCNC: 422 MG/DL (ref 66–433)
IGG SERPL-MCNC: 1025 MG/DL (ref 635–1741)
IGM SERPL-MCNC: 46 MG/DL (ref 45–281)
LDH SERPL-CCNC: 173 U/L (ref 140–271)
LDLC SERPL CALC-MCNC: 54 MG/DL (ref 0–100)
LYMPHOCYTES # BLD AUTO: 14 % (ref 14–44)
LYMPHOCYTES # BLD AUTO: 43.04 THOUSAND/UL (ref 0.6–4.47)
MACROCYTES BLD QL AUTO: PRESENT
MCH RBC QN AUTO: 33.5 PG (ref 26.8–34.3)
MCHC RBC AUTO-ENTMCNC: 32.2 G/DL (ref 31.4–37.4)
MCV RBC AUTO: 104 FL (ref 82–98)
MONOCYTES # BLD AUTO: 0 THOUSAND/UL (ref 0–1.22)
MONOCYTES NFR BLD: 0 % (ref 4–12)
NEUTROPHILS # BLD MANUAL: 7.09 THOUSAND/UL (ref 1.85–7.62)
NEUTS SEG NFR BLD AUTO: 14 % (ref 43–75)
OVALOCYTES BLD QL SMEAR: PRESENT
PLATELET # BLD AUTO: 187 THOUSANDS/UL (ref 149–390)
PLATELET BLD QL SMEAR: ADEQUATE
PMV BLD AUTO: 10.7 FL (ref 8.9–12.7)
POIKILOCYTOSIS BLD QL SMEAR: PRESENT
POLYCHROMASIA BLD QL SMEAR: PRESENT
POTASSIUM SERPL-SCNC: 5 MMOL/L (ref 3.5–5.3)
PROT SERPL-MCNC: 7.8 G/DL (ref 6.4–8.4)
RBC # BLD AUTO: 3.64 MILLION/UL (ref 3.88–5.62)
RBC MORPH BLD: PRESENT
SMUDGE CELLS BLD QL SMEAR: PRESENT
SODIUM SERPL-SCNC: 134 MMOL/L (ref 135–147)
TOTAL CELLS COUNTED SPEC: 100
TRIGL SERPL-MCNC: 111 MG/DL
TSH SERPL DL<=0.05 MIU/L-ACNC: 1.59 UIU/ML (ref 0.45–4.5)
URATE SERPL-MCNC: 5.9 MG/DL (ref 3.5–8.5)
VARIANT LYMPHS # BLD AUTO: 71 %
WBC # BLD AUTO: 50.64 THOUSAND/UL (ref 4.31–10.16)

## 2024-05-31 PROCEDURE — 80053 COMPREHEN METABOLIC PANEL: CPT

## 2024-05-31 PROCEDURE — 85007 BL SMEAR W/DIFF WBC COUNT: CPT

## 2024-05-31 PROCEDURE — 36415 COLL VENOUS BLD VENIPUNCTURE: CPT

## 2024-05-31 PROCEDURE — 82784 ASSAY IGA/IGD/IGG/IGM EACH: CPT

## 2024-05-31 PROCEDURE — 80061 LIPID PANEL: CPT

## 2024-05-31 PROCEDURE — 83036 HEMOGLOBIN GLYCOSYLATED A1C: CPT

## 2024-05-31 PROCEDURE — 85027 COMPLETE CBC AUTOMATED: CPT

## 2024-05-31 PROCEDURE — 84550 ASSAY OF BLOOD/URIC ACID: CPT

## 2024-05-31 PROCEDURE — 84443 ASSAY THYROID STIM HORMONE: CPT

## 2024-05-31 PROCEDURE — 83615 LACTATE (LD) (LDH) ENZYME: CPT

## 2024-05-31 NOTE — TELEPHONE ENCOUNTER
Lab Inquiry   Who are you speaking with? Patient     If it is not the patient, are they listed on an active communication consent form? N/A   Name of ordering provider Dr. Hanley   What is being requested? Lab orders need to be entered   Lab draw location [facility]   What is the best call back number? [phone]   If patient at the lab, Was a live attempts to contact the team made? Yes                               Patient calling in regards to lab orders he has from Dr Hanley.  Patient was at the lab to have labwork done this morning and there is an expectant date on the lab orders.  Patient requested for the lab to do his lab work.  I spoke with the lab and the lab requested for new lab orders to be entered for patient with no expected date on the orders.  Patient would like a call back once the lab orders are entered.  Patient is concerned that he will be charged for this lab work and was very upset that there was an issue with his lab orders and an expected date on the lab orders. [phone]

## 2024-05-31 NOTE — TELEPHONE ENCOUNTER
Lab Inquiry   Who are you speaking with? Patient     If it is not the patient, are they listed on an active communication consent form? N/A   Name of ordering provider Dr. Hanley   What is being requested? Lab orders need to be entered   Lab draw location [facility]   What is the best call back number? [phone]   If patient at the lab, Was a live attempts to contact the team made? Yes

## 2024-06-05 ENCOUNTER — OFFICE VISIT (OUTPATIENT)
Dept: FAMILY MEDICINE CLINIC | Facility: CLINIC | Age: 74
End: 2024-06-05
Payer: MEDICARE

## 2024-06-05 VITALS
SYSTOLIC BLOOD PRESSURE: 134 MMHG | BODY MASS INDEX: 29.62 KG/M2 | DIASTOLIC BLOOD PRESSURE: 82 MMHG | WEIGHT: 194.8 LBS | OXYGEN SATURATION: 96 % | HEART RATE: 81 BPM

## 2024-06-05 DIAGNOSIS — N40.1 BPH WITH OBSTRUCTION/LOWER URINARY TRACT SYMPTOMS: Chronic | ICD-10-CM

## 2024-06-05 DIAGNOSIS — R73.9 HYPERGLYCEMIA: ICD-10-CM

## 2024-06-05 DIAGNOSIS — I48.0 PAROXYSMAL A-FIB (HCC): ICD-10-CM

## 2024-06-05 DIAGNOSIS — I10 ESSENTIAL HYPERTENSION: Chronic | ICD-10-CM

## 2024-06-05 DIAGNOSIS — F51.01 PRIMARY INSOMNIA: ICD-10-CM

## 2024-06-05 DIAGNOSIS — D59.10 AUTOIMMUNE HEMOLYTIC ANEMIA (HCC): ICD-10-CM

## 2024-06-05 DIAGNOSIS — Z12.5 PROSTATE CANCER SCREENING: ICD-10-CM

## 2024-06-05 DIAGNOSIS — N13.8 BPH WITH OBSTRUCTION/LOWER URINARY TRACT SYMPTOMS: Chronic | ICD-10-CM

## 2024-06-05 DIAGNOSIS — F33.9 DEPRESSION, RECURRENT (HCC): ICD-10-CM

## 2024-06-05 DIAGNOSIS — E78.00 HYPERCHOLESTEROLEMIA: Chronic | ICD-10-CM

## 2024-06-05 DIAGNOSIS — K31.819 GAVE (GASTRIC ANTRAL VASCULAR ECTASIA): ICD-10-CM

## 2024-06-05 DIAGNOSIS — C91.10 CLL (CHRONIC LYMPHOCYTIC LEUKEMIA) (HCC): Primary | Chronic | ICD-10-CM

## 2024-06-05 DIAGNOSIS — I50.42 CHRONIC COMBINED SYSTOLIC AND DIASTOLIC CONGESTIVE HEART FAILURE (HCC): ICD-10-CM

## 2024-06-05 DIAGNOSIS — I45.81 LONG QT SYNDROME TYPE 2: ICD-10-CM

## 2024-06-05 DIAGNOSIS — G47.33 OSA (OBSTRUCTIVE SLEEP APNEA): Chronic | ICD-10-CM

## 2024-06-05 DIAGNOSIS — I27.20 PULMONARY HYPERTENSION (HCC): Chronic | ICD-10-CM

## 2024-06-05 PROBLEM — I49.01 VENTRICULAR FIBRILLATION (HCC): Status: RESOLVED | Noted: 2023-03-06 | Resolved: 2024-06-05

## 2024-06-05 PROCEDURE — 99214 OFFICE O/P EST MOD 30 MIN: CPT | Performed by: FAMILY MEDICINE

## 2024-06-05 PROCEDURE — G2211 COMPLEX E/M VISIT ADD ON: HCPCS | Performed by: FAMILY MEDICINE

## 2024-06-05 RX ORDER — ZOLPIDEM TARTRATE 5 MG/1
TABLET ORAL
Qty: 90 TABLET | Refills: 0 | Status: SHIPPED | OUTPATIENT
Start: 2024-06-05

## 2024-06-05 NOTE — ASSESSMENT & PLAN NOTE
White count has trended upwards but he is having no secondary symptoms.  Continue close follow-up with oncology

## 2024-06-05 NOTE — PROGRESS NOTES
Ambulatory Visit  Name: Murphy Willams      : 1950      MRN: [id number]  Encounter Provider: Pravin Ortega Jr, MD  Encounter Date: 2024   Encounter department: [facility]    Assessment & Plan   1. CLL (chronic lymphocytic leukemia) (HCC)  -     Comprehensive metabolic panel; Future  2. Primary insomnia  -     zolpidem (AMBIEN) 5 mg tablet; Take 1/2-1 full tablet at bedtime as needed for sleep.  Do not take if drinking any alcohol or taking lorazepam  3. Long QT syndrome type 2  -     Comprehensive metabolic panel; Future  -     TSH, 3rd generation with Free T4 reflex; Future  4. Paroxysmal A-fib (HCC)  -     Comprehensive metabolic panel; Future  -     TSH, 3rd generation with Free T4 reflex; Future  5. Chronic combined systolic and diastolic congestive heart failure (HCC)  6. Essential hypertension  -     Comprehensive metabolic panel; Future  -     TSH, 3rd generation with Free T4 reflex; Future  7. Depression, recurrent (HCC)  -     Comprehensive metabolic panel; Future  -     TSH, 3rd generation with Free T4 reflex; Future  8. Autoimmune hemolytic anemia (HCC)  -     Comprehensive metabolic panel; Future  9. Pulmonary hypertension (HCC)  -     Comprehensive metabolic panel; Future  10. Hypercholesterolemia  -     Comprehensive metabolic panel; Future  11. Hyperglycemia  -     Hemoglobin A1C; Future  12. Prostate cancer screening  -     PSA, Total Screen; Future       History of Present Illness     Patient presents today for follow-up of chronic health issues.  He has been experiencing some anxiety which is led to his insomnia.  He has a history of some intermittent depressed mood but we are staying away from SSRIs in light of his history of QT prolongation.  He has noticed some very significant improvement in insomnia with Ambien and this seems to help his overall mood.  He really has cut back on drinking and promises that he does not take Ambien if he has been  drinking.  He denies any further problems with palpitations or lightheadedness.  He had no syncope.    He has a history of CLL and follows with oncology.  White count did trend up a bit.  He denies any unintentional weight loss or night sweats.  He has no notable lymphadenopathy.        Review of Systems    Objective     /82 (BP Location: Left arm, Patient Position: Sitting, Cuff Size: Large)   Pulse 81   Wt 88.4 kg (194 lb 12.8 oz)   SpO2 96%   BMI 29.62 kg/m²     Physical Exam  Constitutional:       General: He is not in acute distress.     Appearance: Normal appearance. He is well-developed. He is obese. He is not diaphoretic.   HENT:      Head: Normocephalic.      Right Ear: External ear normal.      Left Ear: External ear normal.      Nose: Nose normal.   Eyes:      General:         Right eye: No discharge.         Left eye: No discharge.      Conjunctiva/sclera: Conjunctivae normal.      Pupils: Pupils are equal, round, and reactive to light.   Neck:      Thyroid: No thyromegaly.      Trachea: No tracheal deviation.   Cardiovascular:      Rate and Rhythm: Normal rate and regular rhythm.      Heart sounds: Normal heart sounds. No murmur heard.     No friction rub.   Pulmonary:      Effort: Pulmonary effort is normal. No respiratory distress.      Breath sounds: Normal breath sounds. No wheezing.   Chest:      Chest wall: No tenderness.   Abdominal:      General: There is no distension.      Palpations: There is no mass.      Tenderness: There is no abdominal tenderness. There is no guarding or rebound.      Hernia: No hernia is present.   Musculoskeletal:         General: No swelling or deformity.      Cervical back: Normal range of motion.      Right lower leg: No edema.      Left lower leg: No edema.   Skin:     Findings: No erythema or rash.   Neurological:      General: No focal deficit present.      Mental Status: He is alert.      Cranial Nerves: No cranial nerve deficit.      Coordination:  Coordination normal.   Psychiatric:         Thought Content: Thought content normal.     Administrative Statements

## 2024-06-05 NOTE — ASSESSMENT & PLAN NOTE
Wt Readings from Last 3 Encounters:   06/05/24 88.4 kg (194 lb 12.8 oz)   04/24/24 88.9 kg (196 lb)   04/04/24 90.1 kg (198 lb 9.6 oz)     No signs of fluid overload.         No

## 2024-06-05 NOTE — ASSESSMENT & PLAN NOTE
He seems to have an element of PTSD since his V-fib episode.  He mostly feels insomnia is a big part of his anxiety and depression and feels this is much improved with Ambien so we decided to continue this.  He also has cut back on alcohol which I believe will be helpful.

## 2024-06-05 NOTE — ASSESSMENT & PLAN NOTE
He has a history of long QT syndrome status post ventricular fibrillation episode.  He is quite aware of this syndrome and is very careful to discuss this whenever medications are being considered.

## 2024-06-14 ENCOUNTER — IN-CLINIC DEVICE VISIT (OUTPATIENT)
Dept: CARDIOLOGY CLINIC | Facility: CLINIC | Age: 74
End: 2024-06-14
Payer: MEDICARE

## 2024-06-14 DIAGNOSIS — Z95.810 AICD (AUTOMATIC CARDIOVERTER/DEFIBRILLATOR) PRESENT: Primary | ICD-10-CM

## 2024-06-14 PROCEDURE — 93284 PRGRMG EVAL IMPLANTABLE DFB: CPT | Performed by: INTERNAL MEDICINE

## 2024-06-14 NOTE — PROGRESS NOTES
Results for orders placed or performed in visit on 06/14/24   Cardiac EP device report    Narrative    MDT BI-V ICD (DDIR) ACTIVE SYSTEM IS MRI CONDITIONAL  DEVICE INTERROGATED IN THE [place] OFFICE: BATTERY VOLTAGE ADEQUATE-6 YRS. % CRT PACING (BIV) 100% (LV) 0%. ALL AVAILABLE LEAD PARAMETERS WITHIN NORMAL LIMITS. NO SIGNIFICANT HIGH RATE EPISODES. OPTI-VOL WITHIN NORMAL LIMITS. NO PROGRAMMING CHANGES MADE TO DEVICE PARAMETERS. NORMAL DEVICE FUNCTION. NC

## 2024-07-02 ENCOUNTER — OFFICE VISIT (OUTPATIENT)
Dept: HEMATOLOGY ONCOLOGY | Facility: CLINIC | Age: 74
End: 2024-07-02
Payer: MEDICARE

## 2024-07-02 VITALS
RESPIRATION RATE: 18 BRPM | TEMPERATURE: 98 F | OXYGEN SATURATION: 99 % | HEIGHT: 68 IN | SYSTOLIC BLOOD PRESSURE: 130 MMHG | DIASTOLIC BLOOD PRESSURE: 80 MMHG | WEIGHT: 191.5 LBS | BODY MASS INDEX: 29.02 KG/M2 | HEART RATE: 90 BPM

## 2024-07-02 DIAGNOSIS — I48.0 PAROXYSMAL A-FIB (HCC): ICD-10-CM

## 2024-07-02 DIAGNOSIS — N40.1 BPH WITH OBSTRUCTION/LOWER URINARY TRACT SYMPTOMS: ICD-10-CM

## 2024-07-02 DIAGNOSIS — I27.20 PULMONARY HYPERTENSION (HCC): ICD-10-CM

## 2024-07-02 DIAGNOSIS — I50.42 CHRONIC COMBINED SYSTOLIC AND DIASTOLIC CONGESTIVE HEART FAILURE (HCC): ICD-10-CM

## 2024-07-02 DIAGNOSIS — C91.10 CLL (CHRONIC LYMPHOCYTIC LEUKEMIA) (HCC): Primary | ICD-10-CM

## 2024-07-02 DIAGNOSIS — N13.8 BPH WITH OBSTRUCTION/LOWER URINARY TRACT SYMPTOMS: ICD-10-CM

## 2024-07-02 DIAGNOSIS — K70.30 ALCOHOLIC CIRRHOSIS OF LIVER WITHOUT ASCITES (HCC): ICD-10-CM

## 2024-07-02 DIAGNOSIS — D59.10 AUTOIMMUNE HEMOLYTIC ANEMIA (HCC): ICD-10-CM

## 2024-07-02 DIAGNOSIS — E53.8 VITAMIN B 12 DEFICIENCY: ICD-10-CM

## 2024-07-02 DIAGNOSIS — M19.90 ARTHRITIS: ICD-10-CM

## 2024-07-02 DIAGNOSIS — R16.2 HEPATOSPLENOMEGALY: ICD-10-CM

## 2024-07-02 PROCEDURE — 99214 OFFICE O/P EST MOD 30 MIN: CPT | Performed by: INTERNAL MEDICINE

## 2024-07-02 PROCEDURE — G2211 COMPLEX E/M VISIT ADD ON: HCPCS | Performed by: INTERNAL MEDICINE

## 2024-07-02 NOTE — PROGRESS NOTES
HPI: Patient is here with his wife.  This is continuation of care for CLL that was diagnosed in 2010 and patient has not required therapy for that.  CLL parameters are being monitored and there has not been significant change.    Patient had iron deficiency and had GI evaluation and had intravenous Venofer.  He follows with his gastroenterologist.   In May 2021 he was found to have profound autoimmune hemolytic anemia with significant drop in hemoglobin and he was treated with prednisone.  No more hemolysis.  Hemoglobin has been stable.  He has enlarged prostate and nocturia and he follows with his urologist.    He has history of QT prolongation and has a pacemaker/defibrillator and he follows with his cardiologist and electrophysiologist.    He also follows with his lung specialist for exertional dyspnea.    Has tiredness and arthritis and low back discomfort.  He gives history of B12 deficiency and has been taking B12 orally.            Current Outpatient Medications:   •  Acetaminophen 500 MG, Take 1,000 mg by mouth prn, Disp: , Rfl:   •  ascorbic acid (VITAMIN C) 250 MG CHEW, Chew 250 mg daily, Disp: , Rfl:   •  atorvastatin (LIPITOR) 20 mg tablet, TAKE ONE TABLET BY MOUTH ONCE EVERY DAY, Disp: 90 tablet, Rfl: 3  •  Blood Pressure Monitoring (Omron 5 Series BP Monitor) PEE, , Disp: , Rfl:   •  dabigatran etexilate (PRADAXA) 150 mg capsu, Take 1 capsule (150 mg total) by mouth 2 (two) times a day, Disp: 180 capsule, Rfl: 2  •  docusate sodium (COLACE) 100 mg capsule, Take 1 capsule (100 mg total) by mouth 2 (two) times a day, Disp: 60 capsule, Rfl: 0  •  Lactobacillus (PROBIOTIC ACIDOPHILUS PO), Take by mouth, Disp: , Rfl:   •  LORazepam (Ativan) 0.5 mg tablet, Take 1 tablet (0.5 mg total) by mouth daily as needed for anxiety, Disp: 60 tablet, Rfl: 1  •  losartan (COZAAR) 50 mg tablet, Take 1 tablet (50 mg total) by mouth daily, Disp: 90 tablet, Rfl: 3  •  montelukast (SINGULAIR) 10 mg tablet, TAKE ONE TABLET  "BY MOUTH DAILY, Disp: 90 tablet, Rfl: 3  •  nadolol (CORGARD) 80 MG tablet, Take 1 tablet (80 mg total) by mouth daily in the early morning, Disp: 90 tablet, Rfl: 3  •  Omega-3 Fatty Acids (FISH OIL) 1,000 mg, Take by mouth daily, Disp: , Rfl:   •  omeprazole (PriLOSEC) 40 MG capsule, TAKE ONE CAPSULE BY MOUTH ONCE EVERY DAY, Disp: 90 capsule, Rfl: 1  •  spironolactone (ALDACTONE) 25 mg tablet, Take 1 tablet (25 mg total) by mouth daily, Disp: 90 tablet, Rfl: 3  •  triamcinolone (KENALOG) 0.1 % ointment, Apply twice daily to spot on clavicle until next visit. May stop if resolved prior to that time, Disp: 15 g, Rfl: 0  •  vitamin B-12 (CYANOCOBALAMIN) 500 MCG TABS, Take 1 tablet (500 mcg total) by mouth daily, Disp: 30 tablet, Rfl: 0  •  zolpidem (AMBIEN) 5 mg tablet, Take 1/2-1 full tablet at bedtime as needed for sleep.  Do not take if drinking any alcohol or taking lorazepam, Disp: 90 tablet, Rfl: 0    Allergies   Allergen Reactions   • Ciprofloxacin Other (See Comments)     LONG QT syndrome   • Codeine Other (See Comments)     agitated   • Sulfamethoxazole-Trimethoprim GI Intolerance, Abdominal Pain and Other (See Comments)     upset stomach   • Macrobid [Nitrofurantoin] Dizziness, Headache and Fatigue     Per Patient       Oncology History    No history exists.       ROS:  07/04/24 Reviewed 12 systems: See symptoms in HPI  Presently no other neurological, cardiac, pulmonary, GI and  symptoms other than mentioned in HPI.  Other symptoms are in HPI. No  fever, chills, bleeding, bone pains, skin rash, weight loss , night sweats,  weakness, numbness,  claudication and gait problem. No frequent infections.  Not unusually sensitive to heat or cold. No swelling of the ankles. No swollen glands.  Patient is anxious.       /80 (BP Location: Left arm, Patient Position: Sitting, Cuff Size: Adult)   Pulse 90   Temp 98 °F (36.7 °C)   Resp 18   Ht 5' 8\" (1.727 m)   Wt 86.9 kg (191 lb 8 oz)   SpO2 99%   BMI " 29.12 kg/m²     Physical Exam:  Alert and oriented and not in distress.  Vitals are above.  No icterus.  No oral thrush.  No palpable neck mass.  Clear lung fields.  Regular heart rate.  Pacemaker/defibrillator.  Soft and nontender abdomen.  No palpable abdominal mass.  No ascites.  No edema of the ankles.  No calf tenderness.  No focal neurological deficit, no skin rash, no palpable lymphadenopathy in the neck and axillary areas, no clubbing.    Patient is anxious.  Performance status 1.    IMAGING:  US abdomen complete    (Results Pending)       LABS:    Results for orders placed or performed in visit on 05/31/24   TSH, 3rd generation with Free T4 reflex   Result Value Ref Range    TSH 3RD GENERATON 1.594 0.450 - 4.500 uIU/mL   Hemoglobin A1C   Result Value Ref Range    Hemoglobin A1C 5.5 Normal 4.0-5.6%; PreDiabetic 5.7-6.4%; Diabetic >=6.5%; Glycemic control for adults with diabetes <7.0% %     mg/dl   Lipid Panel with Direct LDL reflex   Result Value Ref Range    Cholesterol 105 See Comment mg/dL    Triglycerides 111 See Comment mg/dL    HDL, Direct 29 (L) >=40 mg/dL    LDL Calculated 54 0 - 100 mg/dL   CBC and differential   Result Value Ref Range    WBC 50.64 (H) 4.31 - 10.16 Thousand/uL    RBC 3.64 (L) 3.88 - 5.62 Million/uL    Hemoglobin 12.2 12.0 - 17.0 g/dL    Hematocrit 37.9 36.5 - 49.3 %     (H) 82 - 98 fL    MCH 33.5 26.8 - 34.3 pg    MCHC 32.2 31.4 - 37.4 g/dL    RDW 14.9 11.6 - 15.1 %    MPV 10.7 8.9 - 12.7 fL    Platelets 187 149 - 390 Thousands/uL   Uric acid   Result Value Ref Range    Uric Acid 5.9 3.5 - 8.5 mg/dL   LD,Blood   Result Value Ref Range     140 - 271 U/L   IgG, IgA, IgM   Result Value Ref Range     66 - 433 mg/dL    IGG 1,025 635 - 1,741 mg/dL    IGM 46 45 - 281 mg/dL   Comprehensive metabolic panel   Result Value Ref Range    Sodium 134 (L) 135 - 147 mmol/L    Potassium 5.0 3.5 - 5.3 mmol/L    Chloride 98 96 - 108 mmol/L    CO2 26 21 - 32 mmol/L    ANION  GAP 10 4 - 13 mmol/L    BUN 13 5 - 25 mg/dL    Creatinine 0.88 0.60 - 1.30 mg/dL    Glucose, Fasting 110 (H) 65 - 99 mg/dL    Calcium 9.2 8.4 - 10.2 mg/dL    AST 19 13 - 39 U/L    ALT 20 7 - 52 U/L    Alkaline Phosphatase 72 34 - 104 U/L    Total Protein 7.8 6.4 - 8.4 g/dL    Albumin 4.7 3.5 - 5.0 g/dL    Total Bilirubin 0.61 0.20 - 1.00 mg/dL    eGFR 86 ml/min/1.73sq m   Manual Differential(PHLEBS Do Not Order)   Result Value Ref Range    Segmented % 14 (L) 43 - 75 %    Lymphocytes % 14 14 - 44 %    Monocytes % 0 (L) 4 - 12 %    Eosinophils % 1 0 - 6 %    Basophils % 0 0 - 1 %    Atypical Lymphocytes % 71 (H) <=0 %    Absolute Neutrophils 7.09 1.85 - 7.62 Thousand/uL    Absolute Lymphocytes 43.04 (H) 0.60 - 4.47 Thousand/uL    Absolute Monocytes 0.00 0.00 - 1.22 Thousand/uL    Absolute Eosinophils 0.51 (H) 0.00 - 0.40 Thousand/uL    Absolute Basophils 0.00 0.00 - 0.10 Thousand/uL    Total Counted 100     Smudge Cells Present     RBC Morphology Present     Platelet Estimate Adequate Adequate    Differential Comment see note     Anisocytosis Present     Macrocytes Present     Ovalocytes Present     Poikilocytes Present     Polychromasia Present      *Note: Due to a large number of results and/or encounters for the requested time period, some results have not been displayed. A complete set of results can be found in Results Review.               Component  Ref Range & Units 5/31/24  8:13 AM 2/20/24  8:04 AM 10/19/23  9:21 AM 7/26/23  7:24 AM 7/2/23  5:20 AM 7/1/23  5:41 AM 6/30/23  1:22 PM   WBC  4.31 - 10.16 Thousand/uL 50.64 High  38.99 High  48.15 High Panic  39.13 High Panic  12.91 High  18.72 High  33.45 High Panic    RBC  3.88 - 5.62 Million/uL 3.64 Low  3.63 Low  3.93 3.78 Low  2.82 Low  2.85 Low  3.53 Low    Hemoglobin  12.0 - 17.0 g/dL 12.2 12.0 12.6 12.5 9.5 Low  9.3 Low  11.8 Low    Hematocrit  36.5 - 49.3 % 37.9 37.4 39.8 37.6 29.7 Low  29.1 Low  34.7 Low    MCV  82 - 98 fL 104 High  103 High  101 High   100 High  105 High  102 High  98   MCH  26.8 - 34.3 pg 33.5 33.1 32.1 33.1 33.7 32.6 33.4   MCHC  31.4 - 37.4 g/dL 32.2 32.1 31.7 33.2 32.0 32.0 34.0   RDW  11.6 - 15.1 % 14.9 14.5 14.6 14.5 14.6 14.5 14.3   MPV  8.9 - 12.7 fL 10.7 10.8 10.7 11.1 10.3 10.1 9.6   Platelets  149 - 390 Thousands/uL 187 191 226 183 106 Low  111 Low  177   nRBC     0 R, CM                Narrative    This is an appended report.  These results have been appended to a previously verified report.           Labs, Imaging, & Other studies:   All pertinent labs and imaging studies were personally reviewed        Reviewed and discussed with patient.  Patient is here with his wife.  This is continuation of care for CLL that was diagnosed in 2010 and patient has not required therapy for that.  CLL parameters are being monitored and there has not been significant change.    Patient had iron deficiency and had GI evaluation and had intravenous Venofer.  He follows with his gastroenterologist.   In May 2021 he was found to have profound autoimmune hemolytic anemia with significant drop in hemoglobin and he was treated with prednisone.  No more hemolysis.  Hemoglobin has been stable.  He has enlarged prostate and nocturia and he follows with his urologist.    He has history of QT prolongation and has a pacemaker/defibrillator and he follows with his cardiologist and electrophysiologist.    He also follows with his lung specialist for exertional dyspnea.    Has tiredness and arthritis and low back discomfort.  He gives history of B12 deficiency and has been taking B12 orally.  He gives history of B12 deficiency and has been taking B12 orally.  Physical examination and test results are as recorded and discussed in detail.  There is no increase in WBC and lymphocyte count but other parameters for CLL remains stable.  Once again discussed the treatment indications for CLL and he does not have the indication to treat CLL at this time.  Plan is  surveillance for CLL.  Goal is to monitor patient's condition and CLL.  Patient is capable of self-care.  Diet and activities per patient's primary physician and other consultants especially cardiologists.  All discussed in detail.  Questions answered.  Discussed health screening tests.  Assessment and plan: See diagnoses, orders and instructions below.   1. CLL (chronic lymphocytic leukemia) (HCC)    - Beta 2 microglobulin, serum; Future  - CBC and differential; Future  - Comprehensive metabolic panel; Future  - IgG, IgA, IgM; Future  - LD,Blood; Future  - Uric acid; Future  - Direct antiglobulin test; Future  - Reticulocytes; Future  - US abdomen complete; Future    2. Autoimmune hemolytic anemia (HCC)    - LD,Blood; Future  - Direct antiglobulin test; Future  - Reticulocytes; Future    3. Alcoholic cirrhosis of liver without ascites (HCC)    - US abdomen complete; Future    4. Hepatosplenomegaly      5. Paroxysmal A-fib (HCC)      6. Chronic combined systolic and diastolic congestive heart failure (HCC)      7. BPH with obstruction/lower urinary tract symptoms      8. Pulmonary hypertension (HCC)      9. Arthritis      10. Vitamin B 12 deficiency      Blood work and ultrasound of abdomen prior to next visit in 4 months.     .  Patient will continue to follow with  primary physician and other consultants.  Patient  voiced understanding and agrees      Disclaimer: This document was prepared using a dictation device.  If a word or phrase is confusing, or does not make sense, this is likely due to recognition error which was not discovered during the providers review. If you believe an error has occurred, please Contact me through [employer] [facility] Line service for servando?cation.    Counseling / Coordination of Care   .  Provided counseling and support

## 2024-08-19 ENCOUNTER — TELEPHONE (OUTPATIENT)
Age: 74
End: 2024-08-19

## 2024-08-19 NOTE — TELEPHONE ENCOUNTER
Patient called in stating that his brother just past away and he is very nervous lately and having trouble sleeping and has a slight on and off cough, he works out 3 times a week and gets SOB at times and went up a flight of steps and was fatigue. Patient is unsure what to do or what's going on, patient does not want to see any provider other then  and if he can be fix into the schedule. Please advise

## 2024-08-20 ENCOUNTER — APPOINTMENT (OUTPATIENT)
Dept: RADIOLOGY | Facility: MEDICAL CENTER | Age: 74
End: 2024-08-20
Payer: MEDICARE

## 2024-08-20 ENCOUNTER — APPOINTMENT (OUTPATIENT)
Dept: LAB | Facility: MEDICAL CENTER | Age: 74
End: 2024-08-20
Payer: MEDICARE

## 2024-08-20 ENCOUNTER — OFFICE VISIT (OUTPATIENT)
Dept: FAMILY MEDICINE CLINIC | Facility: CLINIC | Age: 74
End: 2024-08-20
Payer: MEDICARE

## 2024-08-20 ENCOUNTER — NURSE TRIAGE (OUTPATIENT)
Dept: OTHER | Facility: OTHER | Age: 74
End: 2024-08-20

## 2024-08-20 VITALS
RESPIRATION RATE: 20 BRPM | HEART RATE: 87 BPM | BODY MASS INDEX: 29.07 KG/M2 | HEIGHT: 68 IN | OXYGEN SATURATION: 98 % | TEMPERATURE: 98.6 F | SYSTOLIC BLOOD PRESSURE: 136 MMHG | DIASTOLIC BLOOD PRESSURE: 72 MMHG | WEIGHT: 191.8 LBS

## 2024-08-20 DIAGNOSIS — R53.82 CHRONIC FATIGUE: ICD-10-CM

## 2024-08-20 DIAGNOSIS — R06.02 SOB (SHORTNESS OF BREATH): ICD-10-CM

## 2024-08-20 DIAGNOSIS — F33.9 DEPRESSION, RECURRENT (HCC): ICD-10-CM

## 2024-08-20 DIAGNOSIS — D59.10 AUTOIMMUNE HEMOLYTIC ANEMIA (HCC): ICD-10-CM

## 2024-08-20 DIAGNOSIS — I27.20 PULMONARY HYPERTENSION (HCC): Chronic | ICD-10-CM

## 2024-08-20 DIAGNOSIS — R73.9 HYPERGLYCEMIA: ICD-10-CM

## 2024-08-20 DIAGNOSIS — I27.20 PULMONARY HYPERTENSION (HCC): ICD-10-CM

## 2024-08-20 DIAGNOSIS — Z78.9 ALCOHOL USE: ICD-10-CM

## 2024-08-20 DIAGNOSIS — E53.8 VITAMIN B 12 DEFICIENCY: ICD-10-CM

## 2024-08-20 DIAGNOSIS — I10 ESSENTIAL HYPERTENSION: Chronic | ICD-10-CM

## 2024-08-20 DIAGNOSIS — I45.81 LONG QT SYNDROME TYPE 2: ICD-10-CM

## 2024-08-20 DIAGNOSIS — I50.42 CHRONIC COMBINED SYSTOLIC AND DIASTOLIC CONGESTIVE HEART FAILURE (HCC): ICD-10-CM

## 2024-08-20 DIAGNOSIS — C91.10 CLL (CHRONIC LYMPHOCYTIC LEUKEMIA) (HCC): Chronic | ICD-10-CM

## 2024-08-20 DIAGNOSIS — I10 ESSENTIAL HYPERTENSION: ICD-10-CM

## 2024-08-20 DIAGNOSIS — I48.0 PAROXYSMAL A-FIB (HCC): ICD-10-CM

## 2024-08-20 DIAGNOSIS — I50.42 CHRONIC COMBINED SYSTOLIC AND DIASTOLIC CONGESTIVE HEART FAILURE (HCC): Primary | ICD-10-CM

## 2024-08-20 DIAGNOSIS — J18.9 PNEUMONIA OF RIGHT MIDDLE LOBE DUE TO INFECTIOUS ORGANISM: ICD-10-CM

## 2024-08-20 DIAGNOSIS — E78.00 HYPERCHOLESTEROLEMIA: Chronic | ICD-10-CM

## 2024-08-20 PROBLEM — D50.9 IRON DEFICIENCY ANEMIA: Chronic | Status: RESOLVED | Noted: 2017-08-24 | Resolved: 2024-08-20

## 2024-08-20 LAB
ALBUMIN SERPL BCG-MCNC: 4.8 G/DL (ref 3.5–5)
ALP SERPL-CCNC: 110 U/L (ref 34–104)
ALT SERPL W P-5'-P-CCNC: 26 U/L (ref 7–52)
ANION GAP SERPL CALCULATED.3IONS-SCNC: 10 MMOL/L (ref 4–13)
ANISOCYTOSIS BLD QL SMEAR: PRESENT
AST SERPL W P-5'-P-CCNC: 20 U/L (ref 13–39)
BASOPHILS # BLD MANUAL: 0 THOUSAND/UL (ref 0–0.1)
BASOPHILS NFR MAR MANUAL: 0 % (ref 0–1)
BILIRUB SERPL-MCNC: 0.75 MG/DL (ref 0.2–1)
BNP SERPL-MCNC: 385 PG/ML (ref 0–100)
BUN SERPL-MCNC: 12 MG/DL (ref 5–25)
CALCIUM SERPL-MCNC: 9.6 MG/DL (ref 8.4–10.2)
CHLORIDE SERPL-SCNC: 94 MMOL/L (ref 96–108)
CO2 SERPL-SCNC: 27 MMOL/L (ref 21–32)
CREAT SERPL-MCNC: 0.89 MG/DL (ref 0.6–1.3)
EOSINOPHIL # BLD MANUAL: 0 THOUSAND/UL (ref 0–0.4)
EOSINOPHIL NFR BLD MANUAL: 0 % (ref 0–6)
ERYTHROCYTE [DISTWIDTH] IN BLOOD BY AUTOMATED COUNT: 14.6 % (ref 11.6–15.1)
EST. AVERAGE GLUCOSE BLD GHB EST-MCNC: 114 MG/DL
GFR SERPL CREATININE-BSD FRML MDRD: 86 ML/MIN/1.73SQ M
GLUCOSE SERPL-MCNC: 101 MG/DL (ref 65–140)
HBA1C MFR BLD: 5.6 %
HCT VFR BLD AUTO: 36.4 % (ref 36.5–49.3)
HGB BLD-MCNC: 11.8 G/DL (ref 12–17)
LYMPHOCYTES # BLD AUTO: 63.34 THOUSAND/UL (ref 0.6–4.47)
LYMPHOCYTES # BLD AUTO: 93 % (ref 14–44)
MACROCYTES BLD QL AUTO: PRESENT
MAGNESIUM SERPL-MCNC: 2.3 MG/DL (ref 1.9–2.7)
MCH RBC QN AUTO: 33.4 PG (ref 26.8–34.3)
MCHC RBC AUTO-ENTMCNC: 32.4 G/DL (ref 31.4–37.4)
MCV RBC AUTO: 103 FL (ref 82–98)
MONOCYTES # BLD AUTO: 0 THOUSAND/UL (ref 0–1.22)
MONOCYTES NFR BLD: 0 % (ref 4–12)
NEUTROPHILS # BLD MANUAL: 4.77 THOUSAND/UL (ref 1.85–7.62)
NEUTS SEG NFR BLD AUTO: 7 % (ref 43–75)
PLATELET # BLD AUTO: 225 THOUSANDS/UL (ref 149–390)
PLATELET BLD QL SMEAR: ADEQUATE
PMV BLD AUTO: 10.8 FL (ref 8.9–12.7)
POTASSIUM SERPL-SCNC: 4.8 MMOL/L (ref 3.5–5.3)
PROT SERPL-MCNC: 8.3 G/DL (ref 6.4–8.4)
RBC # BLD AUTO: 3.53 MILLION/UL (ref 3.88–5.62)
RBC MORPH BLD: PRESENT
SMUDGE CELLS BLD QL SMEAR: PRESENT
SODIUM SERPL-SCNC: 131 MMOL/L (ref 135–147)
TSH SERPL DL<=0.05 MIU/L-ACNC: 1.02 UIU/ML (ref 0.45–4.5)
VIT B12 SERPL-MCNC: 1089 PG/ML (ref 180–914)
WBC # BLD AUTO: 68.11 THOUSAND/UL (ref 4.31–10.16)

## 2024-08-20 PROCEDURE — 85007 BL SMEAR W/DIFF WBC COUNT: CPT

## 2024-08-20 PROCEDURE — 83735 ASSAY OF MAGNESIUM: CPT

## 2024-08-20 PROCEDURE — 36415 COLL VENOUS BLD VENIPUNCTURE: CPT

## 2024-08-20 PROCEDURE — 84443 ASSAY THYROID STIM HORMONE: CPT

## 2024-08-20 PROCEDURE — 80053 COMPREHEN METABOLIC PANEL: CPT

## 2024-08-20 PROCEDURE — 83036 HEMOGLOBIN GLYCOSYLATED A1C: CPT

## 2024-08-20 PROCEDURE — 83880 ASSAY OF NATRIURETIC PEPTIDE: CPT

## 2024-08-20 PROCEDURE — 85027 COMPLETE CBC AUTOMATED: CPT

## 2024-08-20 PROCEDURE — 99215 OFFICE O/P EST HI 40 MIN: CPT | Performed by: FAMILY MEDICINE

## 2024-08-20 PROCEDURE — G2211 COMPLEX E/M VISIT ADD ON: HCPCS | Performed by: FAMILY MEDICINE

## 2024-08-20 PROCEDURE — 71046 X-RAY EXAM CHEST 2 VIEWS: CPT

## 2024-08-20 PROCEDURE — 82607 VITAMIN B-12: CPT

## 2024-08-20 NOTE — PROGRESS NOTES
Ambulatory Visit  Name: Murphy Willams      : 1950      MRN: [id number]  Encounter Provider: Pravin Ortega Jr, MD  Encounter Date: 2024   Encounter department: [facility]      Assessment & Plan  Chronic combined systolic and diastolic congestive heart failure (HCC)  Wt Readings from Last 3 Encounters:   24 87 kg (191 lb 12.8 oz)   24 86.9 kg (191 lb 8 oz)   24 88.4 kg (194 lb 12.8 oz)     He does not appear fluid overloaded today but is having some exertional shortness of breath.  Check chest x-ray as well as BNP.          Orders:    CBC and differential; Future    Comprehensive metabolic panel; Future    B-Type Natriuretic Peptide(BNP); Future    Pneumonia of right middle lobe due to infectious organism    Orders:    amoxicillin-clavulanate (AUGMENTIN) 875-125 mg per tablet; Take 1 tablet by mouth every 12 (twelve) hours for 7 days    XR chest pa & lateral; Future    Essential hypertension  Blood pressure is well-controlled at this time.  Orders:    CBC and differential; Future    Comprehensive metabolic panel; Future    Paroxysmal A-fib (HCC)  A-fib is well-controlled.  Orders:    CBC and differential; Future    Comprehensive metabolic panel; Future    TSH, 3rd generation with Free T4 reflex; Future    Pulmonary hypertension (HCC)    Orders:    CBC and differential; Future    Comprehensive metabolic panel; Future    Long QT syndrome type 2  He is well aware of medications to avoid and is very aware of his QT syndrome.       Depression, recurrent (HCC)  He does have a slight increase in his depressed mood in light of his brother's recent passing.  He cannot take SSRIs.  He is on lorazepam which is very helpful for his anxiety and depressed mood.  He is aware to not drink alcohol whenever taking this.  He takes Ambien on a rare basis as well.  Orders:    CBC and differential; Future    Comprehensive metabolic panel; Future    TSH, 3rd generation with  Free T4 reflex; Future    Alcohol use  He has really minimizes alcohol intake at this point and just has a couple beers per week.  Avoids alcohol when he takes lorazepam or Ambien       Autoimmune hemolytic anemia (HCC)  Continue close follow-up with oncology       CLL (chronic lymphocytic leukemia) (HCC)  Check CBC today in light of his increasing fatigue.  Continue close follow-up with oncology.       Hypercholesterolemia  Continue statin therapy.       Hyperglycemia  Check A1c.  Orders:    Comprehensive metabolic panel; Future    TSH, 3rd generation with Free T4 reflex; Future    Hemoglobin A1C; Future    Vitamin B 12 deficiency    Orders:    Vitamin B12; Future    SOB (shortness of breath)  He is having some increasing shortness of breath.  I am going to send him for labs and a chest x-ray immediately.  He knows to report to the ER if he is having any increasing shortness of breath, chest pain etc.  He does have a history of known coronary disease with CHF.  I am not noting fluid overload on exam today.    Addendum-chest x-ray came back positive for right middle lobe pneumonia.  Augmentin sent in and discussed with the patient.      Orders:    XR chest pa & lateral; Future    B-Type Natriuretic Peptide(BNP); Future    Chronic fatigue    Orders:    CBC and differential; Future    Comprehensive metabolic panel; Future    TSH, 3rd generation with Free T4 reflex; Future    Magnesium; Future         History of Present Illness patient presents today concerned that he has not been feeling well over the past week or 2.  He is upset about a potential diagnosis of cirrhosis noted on a CT previously.  He has been referred for an ultrasound by his oncologist.  He used to drink very significantly but now is cut back to just a few beers per week.  He has been having a lot of anxiety since his brother passed away just recently from what sounds like sudden cardiac death.  He is tearful when mentioning this and does admit to  "some increased depressed mood.  He has a history of QT syndrome and is very careful about medications.  He has a cough that is been present for about 2 weeks.  It is dry cough.  He denies any fever or chills.  He denies any nausea or vomiting.  He has some mild increase in exertional shortness of breath.  He denies orthopnea.    Fatigue  Associated symptoms include coughing and fatigue. Pertinent negatives include no abdominal pain, arthralgias, chest pain, myalgias or rash.   Cough  Associated symptoms include shortness of breath. Pertinent negatives include no chest pain, myalgias, rash or wheezing.     Review of Systems   Constitutional:  Positive for fatigue.   HENT:  Negative for trouble swallowing.    Respiratory:  Positive for cough and shortness of breath. Negative for chest tightness and wheezing.    Cardiovascular:  Negative for chest pain, palpitations and leg swelling.   Gastrointestinal:  Negative for abdominal pain, constipation and diarrhea.   Endocrine: Negative for polyuria.   Genitourinary:  Negative for difficulty urinating and flank pain.   Musculoskeletal:  Negative for arthralgias and myalgias.   Skin:  Negative for rash.   Psychiatric/Behavioral:  Positive for dysphoric mood. Negative for sleep disturbance. The patient is nervous/anxious.      Objective     /72 (BP Location: Right arm, Patient Position: Sitting, Cuff Size: Standard)   Pulse 87   Temp 98.6 °F (37 °C) (Tympanic)   Resp 20   Ht 5' 8\" (1.727 m)   Wt 87 kg (191 lb 12.8 oz)   SpO2 98%   BMI 29.16 kg/m²     Physical Exam  Constitutional:       General: He is not in acute distress.     Appearance: Normal appearance. He is well-developed. He is obese. He is not diaphoretic.   HENT:      Head: Normocephalic.      Right Ear: External ear normal.      Left Ear: External ear normal.      Nose: Nose normal.   Eyes:      General:         Right eye: No discharge.         Left eye: No discharge.      Conjunctiva/sclera: " Conjunctivae normal.      Pupils: Pupils are equal, round, and reactive to light.   Neck:      Thyroid: No thyromegaly.      Trachea: No tracheal deviation.   Cardiovascular:      Rate and Rhythm: Normal rate and regular rhythm.      Heart sounds: Normal heart sounds. No murmur heard.     No friction rub.   Pulmonary:      Effort: Pulmonary effort is normal. No respiratory distress.      Breath sounds: Normal breath sounds. No wheezing.   Chest:      Chest wall: No tenderness.   Abdominal:      General: There is no distension.      Palpations: There is no mass.      Tenderness: There is no abdominal tenderness. There is no guarding or rebound.      Hernia: No hernia is present.   Musculoskeletal:         General: No swelling or deformity.      Cervical back: Normal range of motion.      Right lower leg: No edema.      Left lower leg: No edema.   Lymphadenopathy:      Cervical: No cervical adenopathy.   Skin:     Findings: No erythema or rash.   Neurological:      General: No focal deficit present.      Mental Status: He is alert.      Cranial Nerves: No cranial nerve deficit.      Coordination: Coordination normal.   Psychiatric:         Thought Content: Thought content normal.         Pravin Ortega Jr, MD

## 2024-08-20 NOTE — ASSESSMENT & PLAN NOTE
He has really minimizes alcohol intake at this point and just has a couple beers per week.  Avoids alcohol when he takes lorazepam or Ambien

## 2024-08-20 NOTE — TELEPHONE ENCOUNTER
"Reason for Disposition  • [1] Follow-up call to recent contact AND [2] information only call, no triage required    Answer Assessment - Initial Assessment Questions  1. REASON FOR CALL or QUESTION: \"What is your reason for calling today?\" or \"How can I best help you?\" or \"What question do you have that I can help answer?\"      While waiting for triage return call... Patient received phone call from Dr. Castaneda    Protocols used: Information Only Call - No Triage-ADULT-    "

## 2024-08-20 NOTE — ASSESSMENT & PLAN NOTE
Orders:    CBC and differential; Future    Comprehensive metabolic panel; Future     Problem: Patient Care Overview  Goal: Plan of Care/Patient Progress Review  Outcome: No Change  VSS. Monitor remains sinus rhythm. Pt. Denies pain. Heparin and NTG drips continue per order. Plan for angiogram tomorrow.

## 2024-08-20 NOTE — ASSESSMENT & PLAN NOTE
Wt Readings from Last 3 Encounters:   08/20/24 87 kg (191 lb 12.8 oz)   07/02/24 86.9 kg (191 lb 8 oz)   06/05/24 88.4 kg (194 lb 12.8 oz)     He does not appear fluid overloaded today but is having some exertional shortness of breath.  Check chest x-ray as well as BNP.          Orders:    CBC and differential; Future    Comprehensive metabolic panel; Future    B-Type Natriuretic Peptide(BNP); Future

## 2024-08-20 NOTE — ASSESSMENT & PLAN NOTE
Orders:    amoxicillin-clavulanate (AUGMENTIN) 875-125 mg per tablet; Take 1 tablet by mouth every 12 (twelve) hours for 7 days    XR chest pa & lateral; Future

## 2024-08-20 NOTE — ASSESSMENT & PLAN NOTE
Blood pressure is well-controlled at this time.  Orders:    CBC and differential; Future    Comprehensive metabolic panel; Future

## 2024-08-20 NOTE — ASSESSMENT & PLAN NOTE
A-fib is well-controlled.  Orders:    CBC and differential; Future    Comprehensive metabolic panel; Future    TSH, 3rd generation with Free T4 reflex; Future

## 2024-08-20 NOTE — TELEPHONE ENCOUNTER
"Regarding: question and concerns about  chest xray results  ----- Message from Micaela MCKINLEY sent at 8/20/2024  7:00 PM EDT -----  \"I went for a  chest xray and my results just came through and I have  question and concerns\"    "

## 2024-08-20 NOTE — ASSESSMENT & PLAN NOTE
Check A1c.  Orders:    Comprehensive metabolic panel; Future    TSH, 3rd generation with Free T4 reflex; Future    Hemoglobin A1C; Future

## 2024-08-20 NOTE — ASSESSMENT & PLAN NOTE
He does have a slight increase in his depressed mood in light of his brother's recent passing.  He cannot take SSRIs.  He is on lorazepam which is very helpful for his anxiety and depressed mood.  He is aware to not drink alcohol whenever taking this.  He takes Ambien on a rare basis as well.  Orders:    CBC and differential; Future    Comprehensive metabolic panel; Future    TSH, 3rd generation with Free T4 reflex; Future

## 2024-08-22 DIAGNOSIS — E87.1 HYPONATREMIA: Primary | ICD-10-CM

## 2024-08-28 ENCOUNTER — TELEPHONE (OUTPATIENT)
Age: 74
End: 2024-08-28

## 2024-08-28 ENCOUNTER — OFFICE VISIT (OUTPATIENT)
Dept: PULMONOLOGY | Facility: CLINIC | Age: 74
End: 2024-08-28
Payer: MEDICARE

## 2024-08-28 VITALS
HEIGHT: 68 IN | OXYGEN SATURATION: 95 % | TEMPERATURE: 98.4 F | WEIGHT: 191 LBS | BODY MASS INDEX: 28.95 KG/M2 | SYSTOLIC BLOOD PRESSURE: 102 MMHG | DIASTOLIC BLOOD PRESSURE: 62 MMHG | HEART RATE: 93 BPM

## 2024-08-28 DIAGNOSIS — J18.9 PNEUMONIA OF RIGHT MIDDLE LOBE DUE TO INFECTIOUS ORGANISM: Primary | ICD-10-CM

## 2024-08-28 DIAGNOSIS — R23.1 LIVEDO RETICULARIS: ICD-10-CM

## 2024-08-28 DIAGNOSIS — I27.20 PULMONARY HYPERTENSION (HCC): Chronic | ICD-10-CM

## 2024-08-28 PROCEDURE — 99214 OFFICE O/P EST MOD 30 MIN: CPT | Performed by: INTERNAL MEDICINE

## 2024-08-28 PROCEDURE — G2211 COMPLEX E/M VISIT ADD ON: HCPCS | Performed by: INTERNAL MEDICINE

## 2024-08-28 RX ORDER — BENZONATATE 200 MG/1
200 CAPSULE ORAL 3 TIMES DAILY PRN
Qty: 30 CAPSULE | Refills: 1 | Status: SHIPPED | OUTPATIENT
Start: 2024-08-28

## 2024-08-28 NOTE — ASSESSMENT & PLAN NOTE
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  He has completed the 14-day course  Although not entirely clear whether this is atypical reaction to Augmentin, Augmentin was added to allergy list as contraindication  I did reach out to his primary care physician.  They will arrange for follow-up to monitor the rash

## 2024-08-28 NOTE — ASSESSMENT & PLAN NOTE
Chest x-ray does show slight increase in right middle lobe opacity.  This is not as apparent on the lateral film  He has x-ray ordered for follow-up, if nonresolving, would have low threshold for CT scan  Gradually feeling better after completing course of Augmentin  I did tell him that he could safely return to reintroducing his exercise regimen

## 2024-08-28 NOTE — TELEPHONE ENCOUNTER
Patient calling. Informed of script to pharmacy   
Pt calling- was just in to see Dr. Torrez today. Forgot to ask if there is something he can prescribe for the lingering cough. States he has just been using cough drops which are not helpful. He has coughing fits at time and would really like something to help control this.    Please send any scripts to [facility] on [address].    Please update pt on HOME phone.   
Yes

## 2024-08-28 NOTE — PROGRESS NOTES
Progress note - Pulmonary Medicine   Murphy Willams 73 y.o. male MRN: [id number]       Impression & Plan:     Pneumonia of right middle lobe due to infectious organism  Chest x-ray does show slight increase in right middle lobe opacity.  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  He has completed the 14-day course  Although not entirely clear whether this is atypical reaction to Augmentin, Augmentin was added to allergy list as contraindication  I did reach out to his primary care physician.  They will arrange for follow-up to monitor the rash    Pulmonary hypertension (HCC)  He has an upcoming echocardiogram to be performed by his cardiologist  He has not had any signs or symptoms of pulmonary hypertension.  No shortness of breath, no leg swelling, no abdominal bloating.  Will review echo once available      Return in about 6 weeks (around 10/8/2024).  Will review x-ray and consider CT imaging if appropriate      ______________________________________________________________________    HPI:    Murphy Willams presents today for follow-up of pulmonary hypertension.  He was also recently diagnosed with pneumonia.  He had felt some general malaise and had a cough.  Chest x-ray did show right middle lobe infiltrate and he was placed on Augmentin by his primary care physician.  He tolerated this well but noticed at the tail end of therapy that he developed a rash on his legs.  He had completed therapy but the rash has persisted.    No fever or chills.  No phlegm production.  Denies any chest pain or pleurisy.  He has follow-up x-ray ordered.  No cardiac symptomatology.  No leg swelling.  No abdominal  bloating.  No shortness of breath.    Laboratory data did show increase in his white blood cell count consistent with his CLL and acute infection.  He follows now with Dr. Hanley, previously Dr. Jackson.  He is very concerned about an upcoming ultrasound due to old imaging that suggested that he may have a component of cirrhosis with nodular contour to the liver.  Ultrasound back then however did not suggest cirrhosis        Current Medications:    Current Outpatient Medications:     Acetaminophen 500 MG, Take 1,000 mg by mouth prn, Disp: , Rfl:     ascorbic acid (VITAMIN C) 250 MG CHEW, Chew 250 mg daily, Disp: , Rfl:     atorvastatin (LIPITOR) 20 mg tablet, TAKE ONE TABLET BY MOUTH ONCE EVERY DAY, Disp: 90 tablet, Rfl: 3    Blood Pressure Monitoring (Omron 5 Series BP Monitor) PEE, , Disp: , Rfl:     dabigatran etexilate (PRADAXA) 150 mg capsu, Take 1 capsule (150 mg total) by mouth 2 (two) times a day, Disp: 180 capsule, Rfl: 2    docusate sodium (COLACE) 100 mg capsule, Take 1 capsule (100 mg total) by mouth 2 (two) times a day, Disp: 60 capsule, Rfl: 0    Lactobacillus (PROBIOTIC ACIDOPHILUS PO), Take by mouth, Disp: , Rfl:     LORazepam (Ativan) 0.5 mg tablet, Take 1 tablet (0.5 mg total) by mouth daily as needed for anxiety, Disp: 60 tablet, Rfl: 1    losartan (COZAAR) 50 mg tablet, Take 1 tablet (50 mg total) by mouth daily, Disp: 90 tablet, Rfl: 3    montelukast (SINGULAIR) 10 mg tablet, TAKE ONE TABLET BY MOUTH DAILY, Disp: 90 tablet, Rfl: 3    nadolol (CORGARD) 80 MG tablet, Take 1 tablet (80 mg total) by mouth daily in the early morning, Disp: 90 tablet, Rfl: 3    Omega-3 Fatty Acids (FISH OIL) 1,000 mg, Take by mouth daily, Disp: , Rfl:     omeprazole (PriLOSEC) 40 MG capsule, TAKE ONE CAPSULE BY MOUTH ONCE EVERY DAY, Disp: 90 capsule, Rfl: 1    spironolactone (ALDACTONE) 25 mg tablet, Take 1 tablet (25 mg total) by mouth daily, Disp: 90 tablet, Rfl: 3    triamcinolone (KENALOG) 0.1 % ointment, Apply  "twice daily to spot on clavicle until next visit. May stop if resolved prior to that time, Disp: 15 g, Rfl: 0    vitamin B-12 (CYANOCOBALAMIN) 500 MCG TABS, Take 1 tablet (500 mcg total) by mouth daily, Disp: 30 tablet, Rfl: 0    zolpidem (AMBIEN) 5 mg tablet, Take 1/2-1 full tablet at bedtime as needed for sleep.  Do not take if drinking any alcohol or taking lorazepam, Disp: 90 tablet, Rfl: 0    Review of Systems:    Aside from what is mentioned in the HPI, the review of systems is otherwise negative    Past medical history, surgical history, and family history were reviewed and updated as appropriate    Social history updates:  Social History     Tobacco Use   Smoking Status Former    Current packs/day: 0.00    Types: Cigarettes    Quit date: 1980    Years since quittin.0    Passive exposure: Past   Smokeless Tobacco Never       PhysicalExamination:  Vitals:   /62 (BP Location: Left arm, Patient Position: Sitting, Cuff Size: Large)   Pulse 93   Temp 98.4 °F (36.9 °C) (Tympanic)   Ht 5' 8\" (1.727 m)   Wt 86.6 kg (191 lb)   SpO2 95%   BMI 29.04 kg/m²     Gen:  Comfortable on room air.  No conversational dyspnea  HEENT:  Conjugate gaze.  sclerae anicteric.  Oropharynx moist  Neck: Trachea is midline. No JVD. No adenopathy  Chest: His excursion is limited but clear to auscultation.  I did not appreciate wheezes or crackles.  Cardiac: Regular. no murmur  Abdomen:  benign  Extremities: No edema  Neuro:  Normal speech and mentation  Skin: Lower extremity rash consistent with livedo reticularis        Diagnostic Data:  Labs:  I personally reviewed the most recent laboratory data pertinent to today's visit    Lab Results   Component Value Date    WBC 68.11 (H) 2024    HGB 11.8 (L) 2024    HCT 36.4 (L) 2024     (H) 2024     2024     Lab Results   Component Value Date    SODIUM 131 (L) 2024    K 4.8 2024    CO2 27 2024    CL 94 (L) 2024 "    BUN 12 08/20/2024    CREATININE 0.89 08/20/2024    GLUCOSE 130 01/10/2021    CALCIUM 9.6 08/20/2024       Imaging:  I personally reviewed the images on the PAC system pertinent to today's visit  This x-ray from August 20 was reviewed and shows right middle lobe opacity which is most notable on the frontal projection.  This is not as well appreciated on the lateral    Darrell Torrez MD

## 2024-08-28 NOTE — ASSESSMENT & PLAN NOTE
EXAMINATION: Right foot 3 views



HISTORY: Chronic osteomyelitis.



COMPARISON: 01/11/2023



FINDINGS: 



There has been a transmetatarsal amputation of the right small

toe. There is erosion of the residual proximal fifth metatarsal.

Remaining bones show no erosion or fracture. There is moderate

second metatarsophalangeal osteoarthritis. There is soft tissue

swelling about the right foot.



IMPRESSION:



1. Prior transmetatarsal amputation of the right fifth toe with

erosions of the residual proximal fifth metatarsal in keeping

with continued osteomyelitis.



Dictated by: 



  Dictated on workstation # [id number] He has an upcoming echocardiogram to be performed by his cardiologist  He has not had any signs or symptoms of pulmonary hypertension.  No shortness of breath, no leg swelling, no abdominal bloating.  Will review echo once available

## 2024-09-02 ENCOUNTER — HOSPITAL ENCOUNTER (INPATIENT)
Facility: HOSPITAL | Age: 74
LOS: 10 days | Discharge: HOME | DRG: 841 | End: 2024-09-12
Attending: EMERGENCY MEDICINE | Admitting: INTERNAL MEDICINE
Payer: MEDICARE

## 2024-09-02 ENCOUNTER — APPOINTMENT (EMERGENCY)
Dept: CT IMAGING | Facility: HOSPITAL | Age: 74
DRG: 841 | End: 2024-09-02
Payer: MEDICARE

## 2024-09-02 DIAGNOSIS — C91.10 CLL (CHRONIC LYMPHOCYTIC LEUKEMIA) (HCC): Primary | ICD-10-CM

## 2024-09-02 DIAGNOSIS — R05.9 COUGH: ICD-10-CM

## 2024-09-02 DIAGNOSIS — E87.1 HYPONATREMIA: ICD-10-CM

## 2024-09-02 DIAGNOSIS — M19.90 ARTHRITIS: ICD-10-CM

## 2024-09-02 DIAGNOSIS — D51.9 ANEMIA DUE TO VITAMIN B12 DEFICIENCY, UNSPECIFIED B12 DEFICIENCY TYPE: ICD-10-CM

## 2024-09-02 DIAGNOSIS — K21.9 GASTROESOPHAGEAL REFLUX DISEASE: ICD-10-CM

## 2024-09-02 DIAGNOSIS — D64.9 ANEMIA, UNSPECIFIED TYPE: ICD-10-CM

## 2024-09-02 DIAGNOSIS — I50.42 CHRONIC COMBINED SYSTOLIC AND DIASTOLIC CONGESTIVE HEART FAILURE (HCC): ICD-10-CM

## 2024-09-02 DIAGNOSIS — J18.9 PNEUMONIA: ICD-10-CM

## 2024-09-02 DIAGNOSIS — E87.5 HYPERKALEMIA: ICD-10-CM

## 2024-09-02 PROBLEM — R21 RASH: Status: ACTIVE | Noted: 2024-09-02

## 2024-09-02 LAB
2HR DELTA HS TROPONIN: 1 NG/L
4HR DELTA HS TROPONIN: 0 NG/L
ALBUMIN SERPL BCG-MCNC: 4.4 G/DL (ref 3.5–5)
ALP SERPL-CCNC: 105 U/L (ref 34–104)
ALT SERPL W P-5'-P-CCNC: 21 U/L (ref 7–52)
ANION GAP SERPL CALCULATED.3IONS-SCNC: 6 MMOL/L (ref 4–13)
ANISOCYTOSIS BLD QL SMEAR: PRESENT
AST SERPL W P-5'-P-CCNC: 16 U/L (ref 13–39)
BASOPHILS # BLD MANUAL: 0 THOUSAND/UL (ref 0–0.1)
BASOPHILS NFR MAR MANUAL: 0 % (ref 0–1)
BILIRUB SERPL-MCNC: 1.54 MG/DL (ref 0.2–1)
BNP SERPL-MCNC: 385 PG/ML (ref 0–100)
BUN SERPL-MCNC: 14 MG/DL (ref 5–25)
CALCIUM SERPL-MCNC: 8.9 MG/DL (ref 8.4–10.2)
CARDIAC TROPONIN I PNL SERPL HS: 8 NG/L
CARDIAC TROPONIN I PNL SERPL HS: 8 NG/L
CARDIAC TROPONIN I PNL SERPL HS: 9 NG/L
CHLORIDE SERPL-SCNC: 98 MMOL/L (ref 96–108)
CO2 SERPL-SCNC: 25 MMOL/L (ref 21–32)
CREAT SERPL-MCNC: 0.83 MG/DL (ref 0.6–1.3)
EOSINOPHIL # BLD MANUAL: 0 THOUSAND/UL (ref 0–0.4)
EOSINOPHIL NFR BLD MANUAL: 0 % (ref 0–6)
GFR SERPL CREATININE-BSD FRML MDRD: 88 ML/MIN/1.73SQ M
GLUCOSE SERPL-MCNC: 128 MG/DL (ref 65–140)
HCT VFR BLD AUTO: 24.1 % (ref 36.5–49.3)
HGB BLD-MCNC: 8.7 G/DL (ref 12–17)
L PNEUMO1 AG UR QL IA.RAPID: NEGATIVE
LYMPHOCYTES # BLD AUTO: 104.16 THOUSAND/UL (ref 0.6–4.47)
LYMPHOCYTES # BLD AUTO: 78 % (ref 14–44)
MACROCYTES BLD QL AUTO: PRESENT
MCH RBC QN AUTO: 38.7 PG (ref 26.8–34.3)
MCHC RBC AUTO-ENTMCNC: 36.1 G/DL (ref 31.4–37.4)
MCV RBC AUTO: 107 FL (ref 82–98)
MONOCYTES # BLD AUTO: 0 THOUSAND/UL (ref 0–1.22)
MONOCYTES NFR BLD: 0 % (ref 4–12)
MYELOCYTE ABSOLUTE CT: 1.13 THOUSAND/UL (ref 0–0.1)
MYELOCYTES NFR BLD MANUAL: 1 % (ref 0–1)
NEUTROPHILS # BLD MANUAL: 7.93 THOUSAND/UL (ref 1.85–7.62)
NEUTS BAND NFR BLD MANUAL: 2 % (ref 0–8)
NEUTS SEG NFR BLD AUTO: 5 % (ref 43–75)
PATHOLOGY REVIEW: YES
PLATELET # BLD AUTO: 347 THOUSANDS/UL (ref 149–390)
PLATELET BLD QL SMEAR: ADEQUATE
PMV BLD AUTO: 9.7 FL (ref 8.9–12.7)
POLYCHROMASIA BLD QL SMEAR: PRESENT
POTASSIUM SERPL-SCNC: 4.9 MMOL/L (ref 3.5–5.3)
PROCALCITONIN SERPL-MCNC: 0.06 NG/ML
PROT SERPL-MCNC: 7.7 G/DL (ref 6.4–8.4)
RBC # BLD AUTO: 2.25 MILLION/UL (ref 3.88–5.62)
S PNEUM AG UR QL: NEGATIVE
SMUDGE CELLS BLD QL SMEAR: PRESENT
SODIUM SERPL-SCNC: 129 MMOL/L (ref 135–147)
VARIANT LYMPHS # BLD AUTO: 14 %
WBC # BLD AUTO: 113.22 THOUSAND/UL (ref 4.31–10.16)

## 2024-09-02 PROCEDURE — 87081 CULTURE SCREEN ONLY: CPT

## 2024-09-02 PROCEDURE — 99223 1ST HOSP IP/OBS HIGH 75: CPT | Performed by: INTERNAL MEDICINE

## 2024-09-02 PROCEDURE — 83880 ASSAY OF NATRIURETIC PEPTIDE: CPT | Performed by: EMERGENCY MEDICINE

## 2024-09-02 PROCEDURE — 85027 COMPLETE CBC AUTOMATED: CPT | Performed by: EMERGENCY MEDICINE

## 2024-09-02 PROCEDURE — 36415 COLL VENOUS BLD VENIPUNCTURE: CPT | Performed by: EMERGENCY MEDICINE

## 2024-09-02 PROCEDURE — 99285 EMERGENCY DEPT VISIT HI MDM: CPT

## 2024-09-02 PROCEDURE — 99285 EMERGENCY DEPT VISIT HI MDM: CPT | Performed by: EMERGENCY MEDICINE

## 2024-09-02 PROCEDURE — 84484 ASSAY OF TROPONIN QUANT: CPT | Performed by: EMERGENCY MEDICINE

## 2024-09-02 PROCEDURE — 71275 CT ANGIOGRAPHY CHEST: CPT

## 2024-09-02 PROCEDURE — 99223 1ST HOSP IP/OBS HIGH 75: CPT | Performed by: STUDENT IN AN ORGANIZED HEALTH CARE EDUCATION/TRAINING PROGRAM

## 2024-09-02 PROCEDURE — 84145 PROCALCITONIN (PCT): CPT | Performed by: STUDENT IN AN ORGANIZED HEALTH CARE EDUCATION/TRAINING PROGRAM

## 2024-09-02 PROCEDURE — 80053 COMPREHEN METABOLIC PANEL: CPT | Performed by: EMERGENCY MEDICINE

## 2024-09-02 PROCEDURE — 93005 ELECTROCARDIOGRAM TRACING: CPT

## 2024-09-02 PROCEDURE — 87449 NOS EACH ORGANISM AG IA: CPT | Performed by: STUDENT IN AN ORGANIZED HEALTH CARE EDUCATION/TRAINING PROGRAM

## 2024-09-02 PROCEDURE — 87070 CULTURE OTHR SPECIMN AEROBIC: CPT | Performed by: STUDENT IN AN ORGANIZED HEALTH CARE EDUCATION/TRAINING PROGRAM

## 2024-09-02 PROCEDURE — 87205 SMEAR GRAM STAIN: CPT | Performed by: STUDENT IN AN ORGANIZED HEALTH CARE EDUCATION/TRAINING PROGRAM

## 2024-09-02 PROCEDURE — 96374 THER/PROPH/DIAG INJ IV PUSH: CPT

## 2024-09-02 PROCEDURE — 85007 BL SMEAR W/DIFF WBC COUNT: CPT | Performed by: EMERGENCY MEDICINE

## 2024-09-02 RX ORDER — MONTELUKAST SODIUM 10 MG/1
10 TABLET ORAL DAILY
Status: DISCONTINUED | OUTPATIENT
Start: 2024-09-02 | End: 2024-09-12 | Stop reason: HOSPADM

## 2024-09-02 RX ORDER — ALBUTEROL SULFATE 90 UG/1
2 AEROSOL, METERED RESPIRATORY (INHALATION) EVERY 4 HOURS PRN
Status: DISCONTINUED | OUTPATIENT
Start: 2024-09-02 | End: 2024-09-12 | Stop reason: HOSPADM

## 2024-09-02 RX ORDER — SPIRONOLACTONE 25 MG/1
25 TABLET ORAL DAILY
Status: DISCONTINUED | OUTPATIENT
Start: 2024-09-02 | End: 2024-09-03

## 2024-09-02 RX ORDER — DABIGATRAN ETEXILATE 150 MG/1
150 CAPSULE ORAL 2 TIMES DAILY
Status: DISCONTINUED | OUTPATIENT
Start: 2024-09-02 | End: 2024-09-05

## 2024-09-02 RX ORDER — NADOLOL 20 MG/1
80 TABLET ORAL
Status: DISCONTINUED | OUTPATIENT
Start: 2024-09-02 | End: 2024-09-05

## 2024-09-02 RX ORDER — LOSARTAN POTASSIUM 50 MG/1
50 TABLET ORAL DAILY
Status: DISCONTINUED | OUTPATIENT
Start: 2024-09-02 | End: 2024-09-06

## 2024-09-02 RX ORDER — BENZONATATE 100 MG/1
100 CAPSULE ORAL 3 TIMES DAILY PRN
Status: DISCONTINUED | OUTPATIENT
Start: 2024-09-02 | End: 2024-09-12 | Stop reason: HOSPADM

## 2024-09-02 RX ORDER — ZOLPIDEM TARTRATE 5 MG/1
5 TABLET ORAL
Status: DISCONTINUED | OUTPATIENT
Start: 2024-09-02 | End: 2024-09-12 | Stop reason: HOSPADM

## 2024-09-02 RX ORDER — ATORVASTATIN CALCIUM 20 MG/1
20 TABLET, FILM COATED ORAL
Status: DISCONTINUED | OUTPATIENT
Start: 2024-09-02 | End: 2024-09-12 | Stop reason: HOSPADM

## 2024-09-02 RX ORDER — PANTOPRAZOLE SODIUM 40 MG/1
40 TABLET, DELAYED RELEASE ORAL
Status: DISCONTINUED | OUTPATIENT
Start: 2024-09-02 | End: 2024-09-12 | Stop reason: HOSPADM

## 2024-09-02 RX ORDER — LORAZEPAM 0.5 MG/1
0.5 TABLET ORAL
Status: DISCONTINUED | OUTPATIENT
Start: 2024-09-02 | End: 2024-09-06

## 2024-09-02 RX ORDER — MAGNESIUM HYDROXIDE/ALUMINUM HYDROXICE/SIMETHICONE 120; 1200; 1200 MG/30ML; MG/30ML; MG/30ML
30 SUSPENSION ORAL EVERY 6 HOURS PRN
Status: DISCONTINUED | OUTPATIENT
Start: 2024-09-02 | End: 2024-09-12 | Stop reason: HOSPADM

## 2024-09-02 RX ORDER — GUAIFENESIN 600 MG/1
600 TABLET, EXTENDED RELEASE ORAL 2 TIMES DAILY
Status: DISCONTINUED | OUTPATIENT
Start: 2024-09-02 | End: 2024-09-12 | Stop reason: HOSPADM

## 2024-09-02 RX ORDER — SODIUM CHLORIDE 9 MG/ML
100 INJECTION, SOLUTION INTRAVENOUS CONTINUOUS
Status: DISCONTINUED | OUTPATIENT
Start: 2024-09-02 | End: 2024-09-03

## 2024-09-02 RX ORDER — VANCOMYCIN HYDROCHLORIDE 1 G/200ML
1000 INJECTION, SOLUTION INTRAVENOUS EVERY 12 HOURS
Status: DISCONTINUED | OUTPATIENT
Start: 2024-09-03 | End: 2024-09-02

## 2024-09-02 RX ORDER — ACETAMINOPHEN 325 MG/1
650 TABLET ORAL EVERY 6 HOURS PRN
Status: DISCONTINUED | OUTPATIENT
Start: 2024-09-02 | End: 2024-09-12 | Stop reason: HOSPADM

## 2024-09-02 RX ADMIN — TRIMETHOBENZAMIDE HYDROCHLORIDE 200 MG: 100 INJECTION INTRAMUSCULAR at 18:06

## 2024-09-02 RX ADMIN — BENZONATATE 100 MG: 100 CAPSULE ORAL at 14:25

## 2024-09-02 RX ADMIN — IOHEXOL 85 ML: 350 INJECTION, SOLUTION INTRAVENOUS at 08:10

## 2024-09-02 RX ADMIN — CEFEPIME 2000 MG: 2 INJECTION, POWDER, FOR SOLUTION INTRAVENOUS at 10:08

## 2024-09-02 RX ADMIN — ZOLPIDEM TARTRATE 5 MG: 5 TABLET, COATED ORAL at 21:20

## 2024-09-02 RX ADMIN — BENZONATATE 100 MG: 100 CAPSULE ORAL at 20:07

## 2024-09-02 RX ADMIN — ATORVASTATIN CALCIUM 20 MG: 20 TABLET, FILM COATED ORAL at 17:08

## 2024-09-02 RX ADMIN — LORAZEPAM 0.5 MG: 0.5 TABLET ORAL at 21:20

## 2024-09-02 RX ADMIN — GUAIFENESIN 600 MG: 600 TABLET, EXTENDED RELEASE ORAL at 20:07

## 2024-09-02 RX ADMIN — SODIUM CHLORIDE 100 ML/HR: 0.9 INJECTION, SOLUTION INTRAVENOUS at 12:38

## 2024-09-02 RX ADMIN — DABIGATRAN ETEXILATE MESYLATE 150 MG: 150 CAPSULE ORAL at 17:08

## 2024-09-02 RX ADMIN — GUAIFENESIN 600 MG: 600 TABLET, EXTENDED RELEASE ORAL at 14:25

## 2024-09-02 NOTE — ED PROVIDER NOTES
History  Chief Complaint   Patient presents with    Cough     Patient reports cough. Got chest x-ray done through PCP office, just finished Augmentin for pneumonia. Patient reports rash on legs. Patient reports coughing spells are severe and worse at night, very concerned, worried that his brother just .     HPI  Patient is a 73-year-old male with history of CLL, QT prolongation, V-fib arrest status post ICD presenting with persistent cough.  Patient was recently told that he had right-sided pneumonia based on x-ray and was started on Augmentin.  Patient states that he was on it for couple weeks where he had brief resolution but the coughing returned.  Patient reports no fever, chills, nausea, vomiting, diarrhea.  Due to the persistent coughing spell patient decided come to the emergency department for evaluation.  Reports mild shortness of breath however denies any chest pain.    Prior to Admission Medications   Prescriptions Last Dose Informant Patient Reported? Taking?   Acetaminophen 500 MG  Self Yes No   Sig: Take 1,000 mg by mouth prn   Blood Pressure Monitoring (Omron 5 Series BP Monitor) PEE  Self Yes No   LORazepam (Ativan) 0.5 mg tablet  Self No No   Sig: Take 1 tablet (0.5 mg total) by mouth daily as needed for anxiety   Lactobacillus (PROBIOTIC ACIDOPHILUS PO)  Self Yes No   Sig: Take by mouth   Omega-3 Fatty Acids (FISH OIL) 1,000 mg  Self Yes No   Sig: Take by mouth daily   ascorbic acid (VITAMIN C) 250 MG CHEW  Self Yes No   Sig: Chew 250 mg daily   atorvastatin (LIPITOR) 20 mg tablet  Self No No   Sig: TAKE ONE TABLET BY MOUTH ONCE EVERY DAY   benzonatate (TESSALON) 200 MG capsule   No No   Sig: Take 1 capsule (200 mg total) by mouth 3 (three) times a day as needed for cough   dabigatran etexilate (PRADAXA) 150 mg capsu  Self No No   Sig: Take 1 capsule (150 mg total) by mouth 2 (two) times a day   docusate sodium (COLACE) 100 mg capsule  Self No No   Sig: Take 1 capsule (100 mg total) by mouth  2 (two) times a day   losartan (COZAAR) 50 mg tablet  Self No No   Sig: Take 1 tablet (50 mg total) by mouth daily   montelukast (SINGULAIR) 10 mg tablet  Self No No   Sig: TAKE ONE TABLET BY MOUTH DAILY   nadolol (CORGARD) 80 MG tablet  Self No No   Sig: Take 1 tablet (80 mg total) by mouth daily in the early morning   omeprazole (PriLOSEC) 40 MG capsule  Self No No   Sig: TAKE ONE CAPSULE BY MOUTH ONCE EVERY DAY   spironolactone (ALDACTONE) 25 mg tablet  Self No No   Sig: Take 1 tablet (25 mg total) by mouth daily   triamcinolone (KENALOG) 0.1 % ointment  Self No No   Sig: Apply twice daily to spot on clavicle until next visit. May stop if resolved prior to that time   vitamin B-12 (CYANOCOBALAMIN) 500 MCG TABS  Self No No   Sig: Take 1 tablet (500 mcg total) by mouth daily   zolpidem (AMBIEN) 5 mg tablet   No No   Sig: Take 1/2-1 full tablet at bedtime as needed for sleep.  Do not take if drinking any alcohol or taking lorazepam      Facility-Administered Medications: None       Past Medical History:   Diagnosis Date    Anxiety     BPH (benign prostatic hypertrophy)     Cancer (HCC)     CLL    CLL (chronic lymphocytic leukemia) (HCC)     2009    Colon polyp     Concussion     Resolved: 08/22/16    COVID-19 12/29/2023    Depression     Diverticulitis 01/13/2020 2014 CT done 11/19 12/19 CT done     E coli bacteremia 06/27/2021    2/2 blood cultures with Ecoli  Source appears to be the urine     Epididymitis 05/19/2021 5/2021    Gastrointestinal hemorrhage     Last assessed: 08/27/13    GERD (gastroesophageal reflux disease)     H/O vitamin D deficiency 07/07/2021    Hearing loss of aging     Hiatal hernia     History of right hip replacement 04/19/2021    History of transfusion     Hyperlipidemia     Hypertension     Hyponatremia 03/06/2023    Iron deficiency anemia     Microscopic hematuria     Last assessed: 06/28/13    OA (osteoarthritis)     right hip    Obesity (BMI 30.0-34.9) 04/07/2022    Pneumothorax      Primary osteoarthritis of right hip 09/29/2017    He is status post right hip arthroplasty    Prostatitis     Pulmonary hypertension ([facility])     QT prolongation     Seasonal allergies     Sepsis ([facility]) 03/06/2023    Unresponsive episode 03/07/2023    Urinary tract infection associated with catheterization of urinary tract  ([facility]) 02/05/2021    Pseudomonal UTI asymptomatic.  Per Urology do not treat at this time.    Urinary tract infection associated with catheterization of urinary tract  ([facility]) 02/05/2021    Pseudomonal UTI asymptomatic.  Per Urology do not treat at this time.  He did have urosepsis from E coli 7/21    UTI (urinary tract infection)     in past    Ventricular fibrillation ([facility]) 03/06/2023    Noted after unresponsive episode      Wears glasses        Past Surgical History:   Procedure Laterality Date    ADENOIDECTOMY      BLADDER SURGERY      CARDIAC CATHETERIZATION Left 03/07/2023    Procedure: Cardiac Left Heart Cath;  Surgeon: Rich Ramirez MD;  Location: [facility];  Service: Cardiology    CARDIAC CATHETERIZATION N/A 03/07/2023    Procedure: Cardiac temporary pacemaker;  Surgeon: Rich Ramirez MD;  Location: [facility];  Service: Cardiology    CARDIAC ELECTROPHYSIOLOGY PROCEDURE N/A 03/08/2023    Procedure: Cardiac icd implant;  Surgeon: Filiberto Olsen MD;  Location: [facility];  Service: Cardiology    COLONOSCOPY      CYSTOSCOPY  10/09/2014    Diagnostic    CYSTOSCOPY  06/29/2020    FL CYSTOGRAM  08/15/2022    FRACTURE SURGERY      left lower arm    FRACTURE SURGERY      left femur    HERNIA REPAIR      JOINT REPLACEMENT Right     hip    OTHER SURGICAL HISTORY  03/2011    Spinal anesthesia epidural    WA ARTHRP ACETBLR/PROX FEM PROSTC AGRFT/ALGRFT Right 09/29/2017    Procedure: ARTHROPLASTY HIP TOTAL ANTERIOR;  Surgeon: Roly Liu MD;  Location: [facility];  Service: Orthopedics    TONSILLECTOMY AND ADENOIDECTOMY      TRANSURETHRAL RESECTION OF PROSTATE      x 2    WRIST  "SURGERY         Family History   Problem Relation Age of Onset    No Known Problems Mother     Heart attack Father     Diabetes Brother     Prostate cancer Brother      I have reviewed and agree with the history as documented.    E-Cigarette/Vaping    E-Cigarette Use Never User      E-Cigarette/Vaping Substances    Nicotine No     THC No     CBD No     Flavoring No     Other No     Unknown No      Social History     Tobacco Use    Smoking status: Former     Current packs/day: 0.00     Types: Cigarettes     Quit date: 1980     Years since quittin.0     Passive exposure: Past    Smokeless tobacco: Never   Vaping Use    Vaping status: Never Used   Substance Use Topics    Alcohol use: Yes     Alcohol/week: 10.0 standard drinks of alcohol     Types: 10 Cans of beer per week     Comment: socially    Drug use: Not Currently     Types: Marijuana     Comment: \"medical marijuana\"       Review of Systems   Constitutional:  Negative for chills, diaphoresis, fever and unexpected weight change.   HENT:  Negative for ear pain and sore throat.    Eyes:  Negative for visual disturbance.   Respiratory:  Positive for cough and shortness of breath. Negative for chest tightness.    Cardiovascular:  Negative for chest pain and leg swelling.   Gastrointestinal:  Negative for abdominal distention, abdominal pain, constipation, diarrhea, nausea and vomiting.   Endocrine: Negative.    Genitourinary:  Negative for difficulty urinating and dysuria.   Musculoskeletal: Negative.    Skin: Negative.    Allergic/Immunologic: Negative.    Neurological: Negative.    Hematological: Negative.    Psychiatric/Behavioral: Negative.     All other systems reviewed and are negative.      Physical Exam  Physical Exam  Vitals and nursing note reviewed.   Constitutional:       General: He is not in acute distress.     Appearance: Normal appearance. He is not ill-appearing.   HENT:      Head: Normocephalic and atraumatic.      Right Ear: External ear " normal.      Left Ear: External ear normal.      Nose: Nose normal.      Mouth/Throat:      Mouth: Mucous membranes are moist.      Pharynx: Oropharynx is clear.   Eyes:      General: No scleral icterus.        Right eye: No discharge.         Left eye: No discharge.      Extraocular Movements: Extraocular movements intact.      Conjunctiva/sclera: Conjunctivae normal.      Pupils: Pupils are equal, round, and reactive to light.   Cardiovascular:      Rate and Rhythm: Normal rate and regular rhythm.      Pulses: Normal pulses.      Heart sounds: Normal heart sounds.   Pulmonary:      Effort: Pulmonary effort is normal.      Breath sounds: Normal breath sounds.   Abdominal:      General: Abdomen is flat. Bowel sounds are normal. There is no distension.      Palpations: Abdomen is soft.      Tenderness: There is no abdominal tenderness. There is no guarding or rebound.   Musculoskeletal:         General: Normal range of motion.      Cervical back: Normal range of motion and neck supple.   Skin:     General: Skin is warm and dry.      Capillary Refill: Capillary refill takes less than 2 seconds.   Neurological:      General: No focal deficit present.      Mental Status: He is alert and oriented to person, place, and time. Mental status is at baseline.   Psychiatric:         Mood and Affect: Mood normal.         Behavior: Behavior normal.         Thought Content: Thought content normal.         Judgment: Judgment normal.         Vital Signs  ED Triage Vitals [09/02/24 0714]   Temperature Pulse Respirations Blood Pressure SpO2   98.6 °F (37 °C) 78 20 151/71 99 %      Temp Source Heart Rate Source Patient Position - Orthostatic VS BP Location FiO2 (%)   Oral Monitor Sitting Right arm --      Pain Score       No Pain           Vitals:    09/04/24 0750 09/04/24 1541 09/04/24 1907 09/04/24 2115   BP: 97/50 115/54 121/58 129/64   Pulse: 70 70 70 71   Patient Position - Orthostatic VS: Lying Lying Lying          Visual  Acuity      ED Medications  Medications   acetaminophen (TYLENOL) tablet 650 mg (650 mg Oral Given 9/3/24 2128)   aluminum-magnesium hydroxide-simethicone (MAALOX) oral suspension 30 mL (30 mL Oral Given 9/3/24 1924)   atorvastatin (LIPITOR) tablet 20 mg (20 mg Oral Given 9/4/24 1736)   dabigatran etexilate (PRADAXA) capsule 150 mg (0 mg Oral Hold 9/4/24 1736)   LORazepam (ATIVAN) tablet 0.5 mg (0.5 mg Oral Given 9/4/24 2019)   losartan (COZAAR) tablet 50 mg (0 mg Oral Hold 9/4/24 1030)   nadolol (CORGARD) tablet 80 mg (80 mg Oral Not Given 9/4/24 0551)   montelukast (SINGULAIR) tablet 10 mg (10 mg Oral Given 9/4/24 1030)   pantoprazole (PROTONIX) EC tablet 40 mg (40 mg Oral Given 9/4/24 0554)   zolpidem (AMBIEN) tablet 5 mg (5 mg Oral Given 9/3/24 2230)   albuterol (PROVENTIL HFA,VENTOLIN HFA) inhaler 2 puff (has no administration in time range)   guaiFENesin (MUCINEX) 12 hr tablet 600 mg (600 mg Oral Given 9/4/24 1029)   benzonatate (TESSALON PERLES) capsule 100 mg (100 mg Oral Given 9/4/24 1259)   trimethobenzamide (TIGAN) IM injection 200 mg (200 mg Intramuscular Given 9/2/24 1806)   ascorbic acid (VITAMIN C) tablet 250 mg (250 mg Oral Given 9/4/24 1030)   saccharomyces boulardii (FLORASTOR) capsule 250 mg (250 mg Oral Given 9/4/24 1736)   fish oil capsule 1,000 mg (1,000 mg Oral Given 9/4/24 1030)   cyanocobalamin (VITAMIN B-12) tablet 500 mcg (500 mcg Oral Given 9/4/24 1029)   methylPREDNISolone sodium succinate (Solu-MEDROL) injection 40 mg (40 mg Intravenous Given 9/4/24 1258)   predniSONE tablet 60 mg (has no administration in time range)     Followed by   predniSONE tablet 40 mg (has no administration in time range)     Followed by   predniSONE tablet 20 mg (has no administration in time range)     Followed by   predniSONE tablet 10 mg (has no administration in time range)   iohexol (OMNIPAQUE) 350 MG/ML injection (SINGLE-DOSE) 85 mL (85 mL Intravenous Given 9/2/24 0810)   cefepime (MAXIPIME) 2 g/50 mL  dextrose IVPB (0 mg Intravenous Stopped 9/2/24 1100)   sodium chloride tablet 2 g (2 g Oral Given 9/4/24 1029)   iohexol (OMNIPAQUE) 350 MG/ML injection (SINGLE-DOSE) 100 mL (100 mL Intravenous Given 9/4/24 1501)       Diagnostic Studies  Results Reviewed       Procedure Component Value Units Date/Time    HS Troponin I 4hr [[id number]]  (Normal) Collected: 09/02/24 1308    Lab Status: Final result Specimen: Blood from Arm, Right Updated: 09/02/24 1340     hs TnI 4hr 8 ng/L      Delta 4hr hsTnI 0 ng/L     Procalcitonin [[id number]]  (Normal) Collected: 09/02/24 0733    Lab Status: Final result Specimen: Blood from Arm, Left Updated: 09/02/24 1301     Procalcitonin 0.06 ng/ml     HS Troponin I 2hr [[id number]]  (Normal) Collected: 09/02/24 0931    Lab Status: Final result Specimen: Blood from Arm, Left Updated: 09/02/24 1001     hs TnI 2hr 9 ng/L      Delta 2hr hsTnI 1 ng/L     Manual Differential(PHLEBS Do Not Order) [[id number]]  (Abnormal) Collected: 09/02/24 0733    Lab Status: Final result Specimen: Blood from Arm, Left Updated: 09/02/24 0854     Segmented % 5 %      Bands % 2 %      Lymphocytes % 78 %      Monocytes % 0 %      Eosinophils % 0 %      Basophils % 0 %      Myelocytes % 1 %      Atypical Lymphocytes % 14 %      Absolute Neutrophils 7.93 Thousand/uL      Absolute Lymphocytes 104.16 Thousand/uL      Absolute Monocytes 0.00 Thousand/uL      Absolute Eosinophils 0.00 Thousand/uL      Absolute Basophils 0.00 Thousand/uL      Absolute Myelocytes 1.13 Thousand/uL      Total Counted --     Smudge Cells Present     Platelet Estimate Adequate     Pathology Review Yes     Anisocytosis Present     Macrocytes Present     Polychromasia Present    CBC and differential [[id number]]  (Abnormal) Collected: 09/02/24 0733    Lab Status: Final result Specimen: Blood from Arm, Left Updated: 09/02/24 0844     .22 Thousand/uL      RBC 2.25 Million/uL      Hemoglobin 8.7 g/dL      Hematocrit 24.1 %       fL       MCH 38.7 pg      MCHC 36.1 g/dL      MPV 9.7 fL      Platelets 347 Thousands/uL     Narrative:      This is an appended report.  These results have been appended to a previously verified report.    HS Troponin 0hr (reflex protocol) [[id number]]  (Normal) Collected: 09/02/24 0733    Lab Status: Final result Specimen: Blood from Arm, Left Updated: 09/02/24 0806     hs TnI 0hr 8 ng/L     B-Type Natriuretic Peptide(BNP) [[id number]]  (Abnormal) Collected: 09/02/24 0733    Lab Status: Final result Specimen: Blood from Arm, Left Updated: 09/02/24 0805      pg/mL     Comprehensive metabolic panel [[id number]]  (Abnormal) Collected: 09/02/24 0733    Lab Status: Final result Specimen: Blood from Arm, Left Updated: 09/02/24 0759     Sodium 129 mmol/L      Potassium 4.9 mmol/L      Chloride 98 mmol/L      CO2 25 mmol/L      ANION GAP 6 mmol/L      BUN 14 mg/dL      Creatinine 0.83 mg/dL      Glucose 128 mg/dL      Calcium 8.9 mg/dL      AST 16 U/L      ALT 21 U/L      Alkaline Phosphatase 105 U/L      Total Protein 7.7 g/dL      Albumin 4.4 g/dL      Total Bilirubin 1.54 mg/dL      eGFR 88 ml/min/1.73sq m     Narrative:      National Kid[employer] Foundation guidelines for Chronic Kidney Disease (CKD):     Stage 1 with normal or high GFR (GFR > 90 mL/min/1.73 square meters)    Stage 2 Mild CKD (GFR = 60-89 mL/min/1.73 square meters)    Stage 3A Moderate CKD (GFR = 45-59 mL/min/1.73 square meters)    Stage 3B Moderate CKD (GFR = 30-44 mL/min/1.73 square meters)    Stage 4 Severe CKD (GFR = 15-29 mL/min/1.73 square meters)    Stage 5 End Stage CKD (GFR <15 mL/min/1.73 square meters)  Note: GFR calculation is accurate only with a steady state creatinine                   CT cardiac w pulmonary vein mapping   Final Result by Walker Hensley MD (09/04 1548)      No thrombus in the left atrial appendage.            Workstation performed: [id number]         US abdomen complete   Final Result by Emir Krueger MD  (09/04 1122)      Mild hepatomegaly.. Possible 2 mm hemangioma.   Moderate splenomegaly.   Gallstone in the gallbladder.               Workstation performed: [id number]         CTA ED chest PE study   Final Result by Katia Patel MD (09/02 0841)      No pulmonary embolus.      Right middle lobe pneumonia.      Lack of opacification of the left atrial appendage. It cannot be determined if this is due to nonopacified blood in the nondependent portion of the left atrium or left atrial appendage thrombus.      Mild splenomegaly and minimally enlarged right axillary node, likely due to the patient's history of CLL.      The study was marked in EPIC for immediate notification.            Workstation performed: [id number]                    Procedures  Procedures         ED Course                                 SBIRT 22yo+      Flowsheet Row Most Recent Value   Initial Alcohol Screen: US AUDIT-C     1. How often do you have a drink containing alcohol? 0 Filed at: 09/02/2024 0818   2. How many drinks containing alcohol do you have on a typical day you are drinking?  0 Filed at: 09/02/2024 0818   3a. Male UNDER 65: How often do you have five or more drinks on one occasion? 0 Filed at: 09/02/2024 0818   3b. FEMALE Any Age, or MALE 65+: How often do you have 4 or more drinks on one occassion? 0 Filed at: 09/02/2024 0818   Audit-C Score 0 Filed at: 09/02/2024 0818   ROSALIA: How many times in the past year have you...    Used an illegal drug or used a prescription medication for non-medical reasons? Never Filed at: 09/02/2024 0818            Wells' Criteria for PE      Flowsheet Row Most Recent Value   Wells' Criteria for PE    Clinical signs and symptoms of DVT 0 Filed at: 09/02/2024 0722   PE is primary diagnosis or equally likely 0 Filed at: 09/02/2024 0722   HR >100 0 Filed at: 09/02/2024 0722   Immobilization at least 3 days or Surgery in the previous 4 weeks 0 Filed at: 09/02/2024 0722   Previous, objectively  diagnosed PE or DVT 0 Filed at: 09/02/2024 0722   Hemoptysis 0 Filed at: 09/02/2024 0722   Malignancy with treatment within 6 months or palliative 0 Filed at: 09/02/2024 0722   Wells' Criteria Total 0 Filed at: 09/02/2024 0722                  Medical Decision Making  73 year old male with cough  Patient with recent pna   Due to hx of CLL and risk factors infectious workup and CT obtained for possible PE due to sob   Confirmed pna   Patient admitted to medicine for failed outpatient tx     Problems Addressed:  Cough: acute illness or injury  Pneumonia: acute illness or injury    Amount and/or Complexity of Data Reviewed  Labs: ordered.  Radiology: ordered.    Risk  Prescription drug management.  Decision regarding hospitalization.                 Disposition  Final diagnoses:   Pneumonia   Cough     Time reflects when diagnosis was documented in both MDM as applicable and the Disposition within this note       Time User Action Codes Description Comment    9/2/2024 10:11 AM Regis Kaiser [J18.9] Pneumonia     9/2/2024 10:11 AM Regis Kaiser [R05.9] Cough     9/3/2024 12:20 PM Imelda Reno Add [C91.10] CLL (chronic lymphocytic leukemia) (HCC)     9/3/2024 12:20 PM Imelda Reno Add [D64.9] Anemia, unspecified type     9/4/2024 10:16 AM Imelda Reno Add [I50.42] Chronic combined systolic and diastolic congestive heart failure (HCC)           ED Disposition       ED Disposition   Admit    Condition   Stable    Date/Time   Mon Sep 2, 2024 1011    Comment   Case was discussed with Dr. Nick and the patient's admission status was agreed to be Admission Status: inpatient status to the service of Dr. Nick .               Follow-up Information    None         Current Discharge Medication List        CONTINUE these medications which have NOT CHANGED    Details   Acetaminophen 500 MG Take 1,000 mg by mouth prn      ascorbic acid (VITAMIN C) 250 MG CHEW Chew 250 mg daily      atorvastatin (LIPITOR) 20 mg tablet TAKE  ONE TABLET BY MOUTH ONCE EVERY DAY  Qty: 90 tablet, Refills: 3    Associated Diagnoses: Pure hypercholesterolemia, unspecified      benzonatate (TESSALON) 200 MG capsule Take 1 capsule (200 mg total) by mouth 3 (three) times a day as needed for cough  Qty: 30 capsule, Refills: 1    Associated Diagnoses: Pneumonia of right middle lobe due to infectious organism      Blood Pressure Monitoring (Omron 5 Series BP Monitor) PEE       dabigatran etexilate (PRADAXA) 150 mg capsu Take 1 capsule (150 mg total) by mouth 2 (two) times a day  Qty: 180 capsule, Refills: 2    Associated Diagnoses: Paroxysmal A-fib (HCC)      docusate sodium (COLACE) 100 mg capsule Take 1 capsule (100 mg total) by mouth 2 (two) times a day  Qty: 60 capsule, Refills: 0    Associated Diagnoses: Constipation      Lactobacillus (PROBIOTIC ACIDOPHILUS PO) Take by mouth      LORazepam (Ativan) 0.5 mg tablet Take 1 tablet (0.5 mg total) by mouth daily as needed for anxiety  Qty: 60 tablet, Refills: 1    Associated Diagnoses: Situational anxiety      losartan (COZAAR) 50 mg tablet Take 1 tablet (50 mg total) by mouth daily  Qty: 90 tablet, Refills: 3    Associated Diagnoses: New onset of congestive heart failure (HCC)      montelukast (SINGULAIR) 10 mg tablet TAKE ONE TABLET BY MOUTH DAILY  Qty: 90 tablet, Refills: 3    Associated Diagnoses: Allergic rhinitis due to pollen, unspecified seasonality      nadolol (CORGARD) 80 MG tablet Take 1 tablet (80 mg total) by mouth daily in the early morning  Qty: 90 tablet, Refills: 3    Associated Diagnoses: New onset of congestive heart failure (HCC)      Omega-3 Fatty Acids (FISH OIL) 1,000 mg Take by mouth daily      omeprazole (PriLOSEC) 40 MG capsule TAKE ONE CAPSULE BY MOUTH ONCE EVERY DAY  Qty: 90 capsule, Refills: 1    Associated Diagnoses: Gastroesophageal reflux disease      spironolactone (ALDACTONE) 25 mg tablet Take 1 tablet (25 mg total) by mouth daily  Qty: 90 tablet, Refills: 3    Associated  Diagnoses: New onset of congestive heart failure (HCC)      triamcinolone (KENALOG) 0.1 % ointment Apply twice daily to spot on clavicle until next visit. May stop if resolved prior to that time  Qty: 15 g, Refills: 0    Associated Diagnoses: Rash and nonspecific skin eruption      vitamin B-12 (CYANOCOBALAMIN) 500 MCG TABS Take 1 tablet (500 mcg total) by mouth daily  Qty: 30 tablet, Refills: 0    Associated Diagnoses: Acute anemia      zolpidem (AMBIEN) 5 mg tablet Take 1/2-1 full tablet at bedtime as needed for sleep.  Do not take if drinking any alcohol or taking lorazepam  Qty: 90 tablet, Refills: 0    Associated Diagnoses: Primary insomnia             No discharge procedures on file.    PDMP Review         Value Time User    PDMP Reviewed  Yes 6/5/2024  7:17 PM Pravin Castaneda Jr., MD            ED Provider  Electronically Signed by             Regis Kaiser MD  09/04/24 7932

## 2024-09-02 NOTE — PLAN OF CARE
Problem: PAIN - ADULT  Goal: Verbalizes/displays adequate comfort level or baseline comfort level  Description: Interventions:  - Encourage patient to monitor pain and request assistance  - Assess pain using appropriate pain scale  - Administer analgesics based on type and severity of pain and evaluate response  - Implement non-pharmacological measures as appropriate and evaluate response  - Consider cultural and social influences on pain and pain management  - Notify physician/advanced practitioner if interventions unsuccessful or patient reports new pain  Outcome: Progressing     Problem: INFECTION - ADULT  Goal: Absence or prevention of progression during hospitalization  Description: INTERVENTIONS:  - Assess and monitor for signs and symptoms of infection  - Monitor lab/diagnostic results  - Monitor all insertion sites, i.e. indwelling lines, tubes, and drains  - Monitor endotracheal if appropriate and nasal secretions for changes in amount and color  - [place] appropriate cooling/warming therapies per order  - Administer medications as ordered  - Instruct and encourage patient and family to use good hand hygiene technique  - Identify and instruct in appropriate isolation precautions for identified infection/condition  Outcome: Progressing     Problem: SAFETY ADULT  Goal: Patient will remain free of falls  Description: INTERVENTIONS:  - Educate patient/family on patient safety including physical limitations  - Instruct patient to call for assistance with activity   - Consult OT/PT to assist with strengthening/mobility   - Keep Call bell within reach  - Keep bed low and locked with side rails adjusted as appropriate  - Keep care items and personal belongings within reach  - Initiate and maintain comfort rounds  - Make Fall Risk Sign visible to staff  - Offer Toileting every 2 Hours, in advance of need  - Initiate/Maintain alarm  - Obtain necessary fall risk management equipment  - Apply yellow socks and  bracelet for high fall risk patients  - Consider moving patient to room near nurses station  Outcome: Progressing

## 2024-09-02 NOTE — H&P
[facility]  H&P  Name: Murphy Willams 73 y.o. male I MRN: [id number]  Unit/Bed#: [place] [address] -[facility] I Date of Admission: 9/2/2024   Date of Service: 9/2/2024 I Hospital Day: 0      Assessment & Plan   Rash  Assessment & Plan  Developed a rash shortly after completing Augmentin therapy  Continue to monitor with supportive care time    Long QT syndrome type 2  Assessment & Plan  Avoid QT prolonging agents    Paroxysmal A-fib (HCC)  Assessment & Plan  Continue nadolol and pradaxa    CLL (chronic lymphocytic leukemia) ([facility])  Assessment & Plan  Currently follow with Dr. Hanley, not on any current medications  H&H of 8.7 today, previous baseline of 10-11  Continue to monitor at this time while on pradaxa without evidence of bleeding    Essential hypertension  Assessment & Plan  BP controlled  Continue home meds nadolol, losartan and aldactone    * Pneumonia  Assessment & Plan  73-year-old male with PMH of CLL, A-fib, and HTN presented with cough  Recently diagnosed with pneumonia with his PCP and completed course of antibiotics with Augmentin.  Follow-up with pulmonology, given Tessalon Perles with no significant improvement  Presented to the ED with worsening cough, with CT imaging revealing right middle lobe pneumonia  Concern for possible failure with recent antibiotics Augmentin, though has been afebrile.  Does have significantly elevated white count with known history of CLL  Continue empiric antibiotics with cefepime, consult pulmonology           VTE Prophylaxis: Dabigatran (Pradaxa)  / sequential compression device   Code Status: FC  POLST: There is no POLST form on file for this patient (pre-hospital)  Discussion with family: wife bedside    Anticipated Length of Stay:  Patient will be admitted on an Inpatient basis with an anticipated length of stay of  2 midnights.   Justification for Hospital Stay: IV abx, pulm evaluation    Chief Complaint:   Cough    History of Present  Illness:    Murphy Willams is a 73 y.o. male who presents with cough.  Had previously seen his PCP about 2 weeks prior, has been diagnosed with pneumonia and completed antibiotic course with Augmentin.  He continues to have a cough and had seen pulmonology outpatient which he was prescribed Tessalon Perles.  With no significant improvement he presented to the ED today.  CT imaging did show right middle lobe consolidation.  He continues to have cough with minimal sputum production.  Denies any fevers, chest pain, chills, nausea or vomiting.  He does have an elevated white count with known history of CLL currently following with hematology.  He also reports developing a rash after completing his Augmentin antibiotic course.    Review of Systems:    Review of Systems   Respiratory:  Positive for cough and shortness of breath.    All other systems reviewed and are negative.      Past Medical and Surgical History:     Past Medical History:   Diagnosis Date    Anxiety     BPH (benign prostatic hypertrophy)     Cancer (HCC)     CLL    CLL (chronic lymphocytic leukemia) (HCC)     2009    Colon polyp     Concussion     Resolved: 08/22/16    COVID-19 12/29/2023    Depression     Diverticulitis 01/13/2020 2014 CT done 11/19 12/19 CT done     E coli bacteremia 06/27/2021 2/2 blood cultures with Ecoli  Source appears to be the urine     Epididymitis 05/19/2021 5/2021    Gastrointestinal hemorrhage     Last assessed: 08/27/13    GERD (gastroesophageal reflux disease)     H/O vitamin D deficiency 07/07/2021    Hearing loss of aging     Hiatal hernia     History of right hip replacement 04/19/2021    History of transfusion     Hyperlipidemia     Hypertension     Hyponatremia 03/06/2023    Iron deficiency anemia     Microscopic hematuria     Last assessed: 06/28/13    OA (osteoarthritis)     right hip    Obesity (BMI 30.0-34.9) 04/07/2022    Pneumothorax     Primary osteoarthritis of right hip 09/29/2017    He is status  post right hip arthroplasty    Prostatitis     Pulmonary hypertension (HCC)     QT prolongation     Seasonal allergies     Sepsis ([facility]) 03/06/2023    Unresponsive episode 03/07/2023    Urinary tract infection associated with catheterization of urinary tract  ([facility]) 02/05/2021    Pseudomonal UTI asymptomatic.  Per Urology do not treat at this time.    Urinary tract infection associated with catheterization of urinary tract  ([facility]) 02/05/2021    Pseudomonal UTI asymptomatic.  Per Urology do not treat at this time.  He did have urosepsis from E coli 7/21    UTI (urinary tract infection)     in past    Ventricular fibrillation ([facility]) 03/06/2023    Noted after unresponsive episode      Wears glasses        Past Surgical History:   Procedure Laterality Date    ADENOIDECTOMY      BLADDER SURGERY      CARDIAC CATHETERIZATION Left 03/07/2023    Procedure: Cardiac Left Heart Cath;  Surgeon: Rich Ramirez MD;  Location: [facility];  Service: Cardiology    CARDIAC CATHETERIZATION N/A 03/07/2023    Procedure: Cardiac temporary pacemaker;  Surgeon: Rich Ramirez MD;  Location: [facility];  Service: Cardiology    CARDIAC ELECTROPHYSIOLOGY PROCEDURE N/A 03/08/2023    Procedure: Cardiac icd implant;  Surgeon: Filiberto Olsen MD;  Location: [facility];  Service: Cardiology    COLONOSCOPY      CYSTOSCOPY  10/09/2014    Diagnostic    CYSTOSCOPY  06/29/2020    FL CYSTOGRAM  08/15/2022    FRACTURE SURGERY      left lower arm    FRACTURE SURGERY      left femur    HERNIA REPAIR      JOINT REPLACEMENT Right     hip    OTHER SURGICAL HISTORY  03/2011    Spinal anesthesia epidural    IL ARTHRP ACETBLR/PROX FEM PROSTC AGRFT/ALGRFT Right 09/29/2017    Procedure: ARTHROPLASTY HIP TOTAL ANTERIOR;  Surgeon: Roly Liu MD;  Location: [facility];  Service: Orthopedics    TONSILLECTOMY AND ADENOIDECTOMY      TRANSURETHRAL RESECTION OF PROSTATE      x 2    WRIST SURGERY         Meds/Allergies:    Prior to Admission medications     Medication Sig Start Date End Date Taking? Authorizing Provider   Acetaminophen 500 MG Take 1,000 mg by mouth prn 8/4/22   Historical Provider, MD   ascorbic acid (VITAMIN C) 250 MG CHEW Chew 250 mg daily    Historical Provider, MD   atorvastatin (LIPITOR) 20 mg tablet TAKE ONE TABLET BY MOUTH ONCE EVERY DAY 9/25/23   Pravin Castaneda Jr., MD   benzonatate (TESSALON) 200 MG capsule Take 1 capsule (200 mg total) by mouth 3 (three) times a day as needed for cough 8/28/24   Darrell Torrez MD   Blood Pressure Monitoring (Omron 5 Series BP Monitor) PEE  10/8/21   Historical Provider, MD   dabigatran etexilate (PRADAXA) 150 mg capsu Take 1 capsule (150 mg total) by mouth 2 (two) times a day 1/3/24   Filiberto Olsen MD   docusate sodium (COLACE) 100 mg capsule Take 1 capsule (100 mg total) by mouth 2 (two) times a day 7/2/23 8/28/24  Alessandra Floyd PA-C   Lactobacillus (PROBIOTIC ACIDOPHILUS PO) Take by mouth    Historical Provider, MD   LORazepam (Ativan) 0.5 mg tablet Take 1 tablet (0.5 mg total) by mouth daily as needed for anxiety 12/29/23   Pravin Castaneda Jr., MD   losartan (COZAAR) 50 mg tablet Take 1 tablet (50 mg total) by mouth daily 4/2/24   Eric Bueno DO   montelukast (SINGULAIR) 10 mg tablet TAKE ONE TABLET BY MOUTH DAILY 7/13/22   Pravin Castaneda Jr., MD   nadolol (CORGARD) 80 MG tablet Take 1 tablet (80 mg total) by mouth daily in the early morning 3/25/24   Filiberto Olsen MD   Omega-3 Fatty Acids (FISH OIL) 1,000 mg Take by mouth daily    Historical Provider, MD   omeprazole (PriLOSEC) 40 MG capsule TAKE ONE CAPSULE BY MOUTH ONCE EVERY DAY 3/19/24   Pravin Castaneda Jr., MD   spironolactone (ALDACTONE) 25 mg tablet Take 1 tablet (25 mg total) by mouth daily 4/2/24   Eric Bueno DO   triamcinolone (KENALOG) 0.1 % ointment Apply twice daily to spot on clavicle until next visit. May stop if resolved prior to that time 11/22/23   Coleman Hawthorne MD   vitamin B-12 (CYANOCOBALAMIN) 500 MCG  "TABS Take 1 tablet (500 mcg total) by mouth daily 21  Randal Lomas MD   zolpidem (AMBIEN) 5 mg tablet Take 1/2-1 full tablet at bedtime as needed for sleep.  Do not take if drinking any alcohol or taking lorazepam 24   Pravin Castaneda Jr., MD     I have reviewed home medications with patient personally.    Allergies:   Allergies   Allergen Reactions    Ciprofloxacin Other (See Comments)     LONG QT syndrome    Codeine Other (See Comments)     agitated    Sulfamethoxazole-Trimethoprim GI Intolerance, Abdominal Pain and Other (See Comments)     upset stomach    Augmentin [Amoxicillin-Pot Clavulanate] Rash     Livedo reticularis    Macrobid [Nitrofurantoin] Dizziness, Headache and Fatigue     Per Patient       Social History:     Marital Status: /Civil Union   Occupation: retired  Patient Pre-hospital Living Situation: home  Patient Pre-hospital Level of Mobility: amb  Patient Pre-hospital Diet Restrictions: none  Substance Use History:   Social History     Substance and Sexual Activity   Alcohol Use Yes    Alcohol/week: 10.0 standard drinks of alcohol    Types: 10 Cans of beer per week    Comment: socially     Social History     Tobacco Use   Smoking Status Former    Current packs/day: 0.00    Types: Cigarettes    Quit date: 1980    Years since quittin.0    Passive exposure: Past   Smokeless Tobacco Never     Social History     Substance and Sexual Activity   Drug Use Not Currently    Types: Marijuana    Comment: \"medical marijuana\"       Family History:    Family History   Problem Relation Age of Onset    No Known Problems Mother     Heart attack Father     Diabetes Brother     Prostate cancer Brother        Physical Exam:     Vitals:   Blood Pressure: 128/68 (24 1123)  Pulse: 71 (24 1123)  Temperature: 97.9 °F (36.6 °C) (24 1123)  Temp Source: Oral (2414)  Respirations: 18 (24 1123)  Weight - Scale: 86.6 kg (190 lb 14.7 oz) (24 07)  SpO2: " 97 % (09/02/24 1123)    Physical Exam  Vitals and nursing note reviewed.   Constitutional:       Appearance: Normal appearance.   HENT:      Head: Normocephalic.   Eyes:      Conjunctiva/sclera: Conjunctivae normal.   Cardiovascular:      Rate and Rhythm: Normal rate.   Pulmonary:      Effort: Pulmonary effort is normal.      Breath sounds: Rhonchi present. No wheezing.   Abdominal:      General: Bowel sounds are normal. There is no distension.      Palpations: Abdomen is soft.   Musculoskeletal:         General: No swelling.   Skin:     General: Skin is warm and dry.   Neurological:      General: No focal deficit present.      Mental Status: He is alert. Mental status is at baseline.         Additional Data:     Lab Results: I have personally reviewed pertinent reports.      Results from last 7 days   Lab Units 09/02/24  0733   WBC Thousand/uL 113.22*   HEMOGLOBIN g/dL 8.7*   HEMATOCRIT % 24.1*   PLATELETS Thousands/uL 347   BANDS PCT % 2   LYMPHO PCT % 78*   MONO PCT % 0*   EOS PCT % 0     Results from last 7 days   Lab Units 09/02/24  0733   SODIUM mmol/L 129*   POTASSIUM mmol/L 4.9   CHLORIDE mmol/L 98   CO2 mmol/L 25   BUN mg/dL 14   CREATININE mg/dL 0.83   ANION GAP mmol/L 6   CALCIUM mg/dL 8.9   ALBUMIN g/dL 4.4   TOTAL BILIRUBIN mg/dL 1.54*   ALK PHOS U/L 105*   ALT U/L 21   AST U/L 16   GLUCOSE RANDOM mg/dL 128                       Imaging: I have personally reviewed pertinent reports.      CTA ED chest PE study   Final Result by Katia Patel MD (09/02 0841)      No pulmonary embolus.      Right middle lobe pneumonia.      Lack of opacification of the left atrial appendage. It cannot be determined if this is due to nonopacified blood in the nondependent portion of the left atrium or left atrial appendage thrombus.      Mild splenomegaly and minimally enlarged right axillary node, likely due to the patient's history of CLL.      The study was marked in EPIC for immediate notification.             Workstation performed: [id number]             EKG, Pathology, and Other Studies Reviewed on Admission:   EKG: Widen QRS    Allscripts / Epic Records Reviewed: Yes     ** Please Note: This note has been constructed using a voice recognition system. **

## 2024-09-02 NOTE — PLAN OF CARE
Problem: PAIN - ADULT  Goal: Verbalizes/displays adequate comfort level or baseline comfort level  Description: Interventions:  - Encourage patient to monitor pain and request assistance  - Assess pain using appropriate pain scale  - Administer analgesics based on type and severity of pain and evaluate response  - Implement non-pharmacological measures as appropriate and evaluate response  - Consider cultural and social influences on pain and pain management  - Notify physician/advanced practitioner if interventions unsuccessful or patient reports new pain  Outcome: Progressing     Problem: INFECTION - ADULT  Goal: Absence or prevention of progression during hospitalization  Description: INTERVENTIONS:  - Assess and monitor for signs and symptoms of infection  - Monitor lab/diagnostic results  - Monitor all insertion sites, i.e. indwelling lines, tubes, and drains  - Monitor endotracheal if appropriate and nasal secretions for changes in amount and color  - [place] appropriate cooling/warming therapies per order  - Administer medications as ordered  - Instruct and encourage patient and family to use good hand hygiene technique  - Identify and instruct in appropriate isolation precautions for identified infection/condition  Outcome: Progressing

## 2024-09-02 NOTE — CONSULTS
"Consultation - Pulmonary Medicine   Murphy Willams 73 y.o. male MRN: [id number]  Unit/Bed#: [place] [address] -01 Encounter: [id number]      Assessment/Plan:    RML pneumonia  Chronic lymphocytic leukemia  Prolonged QT syndrome  Afib, on pradaxa     Patient is stable on room air  CTA chest reviewed with patchy consolidation of the right middle lobe  Recommend increasing IV cefepime to 2g q8h and adding IV vanco for pseudomonas coverage  Patient will need bronchoscopy, tomorrow if possible. Will make NPO overnight and discuss arrangement with team  Check MRSA and sputum culture. Follow up urinary antigens  Follows with Dr Hanley as outpatient    Plan of care discussed with patients wife who was present at the bedside    History of Present Illness   Physician Requesting Consult: Cyril Nick MD  Reason for Consult / Principal Problem: pneumonia  Chief Complaint: \"I'm still coughing\"  HPI: Murphy Willams is a 73 y.o.  male who presented to [facility] with complaints of ongoing cough. He was seen by his PCP 2 weeks ago for pneumonia and treated with 14 day course of Augmentin. He did develop a rash, likely from the Augmentin, which Dr Torrez and his PCP are aware.    His primary pulmnologist is Dr Torrez. He was seen in the office Wednesday 8/28 and at that point feeling improved. However over the weekend chest tightness and cough worsened. He reports a productive cough bringing up mostly clear sputum. He also reports some worsening dyspnea on exertion. He denies any fevers, chills, or hemoptysis. He does endorse some pleuritic chest discomfort worsened with coughing.       Inpatient consult to Pulmonology  Consult performed by: MATHEW Humphrey  Consult ordered by: Cyril Nick MD          Review of Systems   Constitutional:  Negative for chills and fever.   HENT:  Negative for congestion, ear pain, rhinorrhea and sore throat.    Eyes:  Negative for pain and visual disturbance.   Respiratory:  " Positive for cough, chest tightness and shortness of breath. Negative for wheezing.    Cardiovascular:  Positive for chest pain. Negative for palpitations.   Gastrointestinal:  Negative for abdominal pain and vomiting.   Musculoskeletal:  Negative for arthralgias and back pain.   Skin:  Positive for rash. Negative for color change.   Neurological:  Negative for syncope and light-headedness.   All other systems reviewed and are negative.      Historical Information   Past Medical History:   Diagnosis Date    Anxiety     BPH (benign prostatic hypertrophy)     Cancer ([facility])     CLL    CLL (chronic lymphocytic leukemia) ([facility])     2009    Colon polyp     Concussion     Resolved: 08/22/16    COVID-19 12/29/2023    Depression     Diverticulitis 01/13/2020 2014 CT done 11/19 12/19 CT done     E coli bacteremia 06/27/2021 2/2 blood cultures with Ecoli  Source appears to be the urine     Epididymitis 05/19/2021 5/2021    Gastrointestinal hemorrhage     Last assessed: 08/27/13    GERD (gastroesophageal reflux disease)     H/O vitamin D deficiency 07/07/2021    Hearing loss of aging     Hiatal hernia     History of right hip replacement 04/19/2021    History of transfusion     Hyperlipidemia     Hypertension     Hyponatremia 03/06/2023    Iron deficiency anemia     Microscopic hematuria     Last assessed: 06/28/13    OA (osteoarthritis)     right hip    Obesity (BMI 30.0-34.9) 04/07/2022    Pneumothorax     Primary osteoarthritis of right hip 09/29/2017    He is status post right hip arthroplasty    Prostatitis     Pulmonary hypertension ([facility])     QT prolongation     Seasonal allergies     Sepsis ([facility]) 03/06/2023    Unresponsive episode 03/07/2023    Urinary tract infection associated with catheterization of urinary tract  ([facility]) 02/05/2021    Pseudomonal UTI asymptomatic.  Per Urology do not treat at this time.    Urinary tract infection associated with catheterization of urinary tract  ([facility]) 02/05/2021    Pseudomonal  "UTI asymptomatic.  Per Urology do not treat at this time.  He did have urosepsis from E coli     UTI (urinary tract infection)     in past    Ventricular fibrillation (HCC) 2023    Noted after unresponsive episode      Wears glasses      Past Surgical History:   Procedure Laterality Date    ADENOIDECTOMY      BLADDER SURGERY      CARDIAC CATHETERIZATION Left 2023    Procedure: Cardiac Left Heart Cath;  Surgeon: Rich Ramirez MD;  Location: [facility];  Service: Cardiology    CARDIAC CATHETERIZATION N/A 2023    Procedure: Cardiac temporary pacemaker;  Surgeon: Rich Ramirez MD;  Location: [facility];  Service: Cardiology    CARDIAC ELECTROPHYSIOLOGY PROCEDURE N/A 2023    Procedure: Cardiac icd implant;  Surgeon: Filiberto Olsen MD;  Location: [facility];  Service: Cardiology    COLONOSCOPY      CYSTOSCOPY  10/09/2014    Diagnostic    CYSTOSCOPY  2020    FL CYSTOGRAM  08/15/2022    FRACTURE SURGERY      left lower arm    FRACTURE SURGERY      left femur    HERNIA REPAIR      JOINT REPLACEMENT Right     hip    OTHER SURGICAL HISTORY  2011    Spinal anesthesia epidural    [place] ARTHRP ACETBLR/PROX FEM PROSTC AGRFT/ALGRFT Right 2017    Procedure: ARTHROPLASTY HIP TOTAL ANTERIOR;  Surgeon: Roly Liu MD;  Location: [facility];  Service: Orthopedics    TONSILLECTOMY AND ADENOIDECTOMY      TRANSURETHRAL RESECTION OF PROSTATE      x 2    WRIST SURGERY       Social History   Social History     Substance and Sexual Activity   Alcohol Use Yes    Alcohol/week: 10.0 standard drinks of alcohol    Types: 10 Cans of beer per week    Comment: socially     Social History     Substance and Sexual Activity   Drug Use Not Currently    Types: Marijuana    Comment: \"medical marijuana\"     Social History     Tobacco Use   Smoking Status Former    Current packs/day: 0.00    Types: Cigarettes    Quit date: 1980    Years since quittin.0    Passive exposure: Past "   Smokeless Tobacco Never     E-Cigarette/Vaping    E-Cigarette Use Never User      E-Cigarette/Vaping Substances    Nicotine No     THC No     CBD No     Flavoring No     Other No     Unknown No        Meds/Allergies   all current active meds have been reviewed and current meds:   Current Facility-Administered Medications   Medication Dose Route Frequency    acetaminophen (TYLENOL) tablet 650 mg  650 mg Oral Q6H PRN    albuterol (PROVENTIL HFA,VENTOLIN HFA) inhaler 2 puff  2 puff Inhalation Q4H PRN    aluminum-magnesium hydroxide-simethicone (MAALOX) oral suspension 30 mL  30 mL Oral Q6H PRN    atorvastatin (LIPITOR) tablet 20 mg  20 mg Oral Daily With Dinner    benzonatate (TESSALON PERLES) capsule 100 mg  100 mg Oral TID PRN    [START ON 9/3/2024] cefepime (MAXIPIME) 1,000 mg in dextrose 5 % 50 mL IVPB  1,000 mg Intravenous Q8H    dabigatran etexilate (PRADAXA) capsule 150 mg  150 mg Oral BID    guaiFENesin (MUCINEX) 12 hr tablet 600 mg  600 mg Oral BID    LORazepam (ATIVAN) tablet 0.5 mg  0.5 mg Oral HS PRN    losartan (COZAAR) tablet 50 mg  50 mg Oral Daily    montelukast (SINGULAIR) tablet 10 mg  10 mg Oral Daily    nadolol (CORGARD) tablet 80 mg  80 mg Oral Early Morning    pantoprazole (PROTONIX) EC tablet 40 mg  40 mg Oral Early Morning    sodium chloride 0.9 % infusion  100 mL/hr Intravenous Continuous    spironolactone (ALDACTONE) tablet 25 mg  25 mg Oral Daily    zolpidem (AMBIEN) tablet 5 mg  5 mg Oral HS       Allergies   Allergen Reactions    Ciprofloxacin Other (See Comments)     LONG QT syndrome    Codeine Other (See Comments)     agitated    Sulfamethoxazole-Trimethoprim GI Intolerance, Abdominal Pain and Other (See Comments)     upset stomach    Augmentin [Amoxicillin-Pot Clavulanate] Rash     Livedo reticularis    Macrobid [Nitrofurantoin] Dizziness, Headache and Fatigue     Per Patient       Objective   Vitals: Blood pressure 128/68, pulse 71, temperature 97.9 °F (36.6 °C), resp. rate 18, height  "5' 8\" (1.727 m), weight 86.6 kg (190 lb 14.7 oz), SpO2 97%.RA,Body mass index is 29.03 kg/m².    Intake/Output Summary (Last 24 hours) at 9/2/2024 1504  Last data filed at 9/2/2024 1248  Gross per 24 hour   Intake 50 ml   Output 550 ml   Net -500 ml     Invasive Devices       Peripheral Intravenous Line  Duration             Peripheral IV 09/02/24 Left Forearm <1 day                    Physical Exam  Vitals and nursing note reviewed.   Constitutional:       General: He is not in acute distress.     Appearance: Normal appearance.   HENT:      Head: Normocephalic and atraumatic.      Nose: Nose normal.      Mouth/Throat:      Mouth: Mucous membranes are moist.   Eyes:      Conjunctiva/sclera: Conjunctivae normal.   Cardiovascular:      Rate and Rhythm: Normal rate.      Heart sounds: Normal heart sounds.   Pulmonary:      Effort: Pulmonary effort is normal. No respiratory distress.      Breath sounds: Decreased breath sounds (right mid and lower) and rhonchi present. No wheezing.   Abdominal:      Palpations: Abdomen is soft.   Musculoskeletal:         General: No swelling. Normal range of motion.      Cervical back: Normal range of motion and neck supple.   Skin:     General: Skin is warm and dry.      Capillary Refill: Capillary refill takes less than 2 seconds.   Neurological:      General: No focal deficit present.      Mental Status: He is alert and oriented to person, place, and time.   Psychiatric:         Mood and Affect: Mood normal.         Lab Results: I have personally reviewed pertinent lab results., BNP:   Lab Results   Component Value Date     (H) 09/02/2024   , CBC:   Lab Results   Component Value Date    .22 (H) 09/02/2024    HGB 8.7 (L) 09/02/2024    HCT 24.1 (L) 09/02/2024     (H) 09/02/2024     09/02/2024    RBC 2.25 (L) 09/02/2024    MCH 38.7 (H) 09/02/2024    MCHC 36.1 09/02/2024    MPV 9.7 09/02/2024   , CMP:   Lab Results   Component Value Date    SODIUM 129 (L) " "09/02/2024    K 4.9 09/02/2024    CL 98 09/02/2024    CO2 25 09/02/2024    BUN 14 09/02/2024    CREATININE 0.83 09/02/2024    CALCIUM 8.9 09/02/2024    AST 16 09/02/2024    ALT 21 09/02/2024    ALKPHOS 105 (H) 09/02/2024    EGFR 88 09/02/2024     Procalcitonin: 0.06    Culture Data: Send sputum culture    Urinary antigens: Pending      Imaging Studies: I have personally reviewed pertinent reports.   and I have personally reviewed pertinent films in PACS     CT chest pe study, 9/2/2024  Patchy consolidation in the right middle lobe due to pneumonia. Benign calcified granulomas.       Code Status: Level 1 - Full Code      Maritza Boo MSN RN FNP-BC  Nurse Practitioner  [facility]       Portions of the record may have been created with voice recognition software.  Occasional wrong word or \"sound a like\" substitutions may have occurred due to the inherent limitations of voice recognition software.  Read the chart carefully and recognize, using context, where substitutions have occurred.  "

## 2024-09-02 NOTE — ASSESSMENT & PLAN NOTE
Currently follow with Dr. Hanley, not on any current medications  H&H of 8.7 today, previous baseline of 10-11  Continue to monitor at this time while on pradaxa without evidence of bleeding

## 2024-09-02 NOTE — ASSESSMENT & PLAN NOTE
73-year-old male with PMH of CLL, A-fib, and HTN presented with cough  Recently diagnosed with pneumonia with his PCP and completed course of antibiotics with Augmentin.  Follow-up with pulmonology, given Tessalon Perles with no significant improvement  Presented to the ED with worsening cough, with CT imaging revealing right middle lobe pneumonia  Concern for possible failure with recent antibiotics Augmentin, though has been afebrile.  Does have significantly elevated white count with known history of CLL  Continue empiric antibiotics with cefepime, consult pulmonology

## 2024-09-02 NOTE — ASSESSMENT & PLAN NOTE
Developed a rash shortly after completing Augmentin therapy  Continue to monitor with supportive care time

## 2024-09-03 ENCOUNTER — APPOINTMENT (INPATIENT)
Dept: NON INVASIVE DIAGNOSTICS | Facility: HOSPITAL | Age: 74
DRG: 841 | End: 2024-09-03
Payer: MEDICARE

## 2024-09-03 PROBLEM — D64.9 ANEMIA: Status: ACTIVE | Noted: 2024-09-03

## 2024-09-03 LAB
ABO GROUP BLD: NORMAL
ALBUMIN SERPL BCG-MCNC: 3.8 G/DL (ref 3.5–5)
ALP SERPL-CCNC: 80 U/L (ref 34–104)
ALT SERPL W P-5'-P-CCNC: 20 U/L (ref 7–52)
ANION GAP SERPL CALCULATED.3IONS-SCNC: 8 MMOL/L (ref 4–13)
AORTIC ROOT: 3.7 CM
AORTIC VALVE MEAN VELOCITY: 10.5 M/S
APICAL FOUR CHAMBER EJECTION FRACTION: 60 %
AST SERPL W P-5'-P-CCNC: 29 U/L (ref 13–39)
ATRIAL RATE: 53 BPM
ATRIAL RATE: 67 BPM
ATRIAL RATE: 75 BPM
AV AREA BY CONTINUOUS VTI: 2.2 CM2
AV AREA PEAK VELOCITY: 2.4 CM2
AV LVOT MEAN GRADIENT: 2 MMHG
AV LVOT PEAK GRADIENT: 5 MMHG
AV MEAN GRADIENT: 5 MMHG
AV PEAK GRADIENT: 10 MMHG
AV REGURGITATION PRESSURE HALF TIME: 416 MS
AV VALVE AREA: 2.22 CM2
AV VELOCITY RATIO: 0.69
BASOPHILS # BLD AUTO: 0.43 THOUSANDS/ÂΜL (ref 0–0.1)
BASOPHILS NFR BLD AUTO: 0 % (ref 0–1)
BILIRUB DIRECT SERPL-MCNC: 0.43 MG/DL (ref 0–0.2)
BILIRUB SERPL-MCNC: 2.29 MG/DL (ref 0.2–1)
BLD GP AB SCN SERPL QL: POSITIVE
BLD SMEAR INTERP: NORMAL
BSA FOR ECHO PROCEDURE: 2 M2
BUN SERPL-MCNC: 17 MG/DL (ref 5–25)
CALCIUM SERPL-MCNC: 8.4 MG/DL (ref 8.4–10.2)
CHLORIDE SERPL-SCNC: 96 MMOL/L (ref 96–108)
CO2 SERPL-SCNC: 21 MMOL/L (ref 21–32)
CREAT SERPL-MCNC: 0.84 MG/DL (ref 0.6–1.3)
DOP CALC AO PEAK VEL: 1.59 M/S
DOP CALC AO VTI: 33.18 CM
DOP CALC LVOT AREA: 3.46 CM2
DOP CALC LVOT CARDIAC INDEX: 2.63 L/MIN/M2
DOP CALC LVOT CARDIAC OUTPUT: 5.26 L/MIN
DOP CALC LVOT DIAMETER: 2.1 CM
DOP CALC LVOT PEAK VEL VTI: 21.3 CM
DOP CALC LVOT PEAK VEL: 1.09 M/S
DOP CALC LVOT STROKE INDEX: 38 ML/M2
DOP CALC LVOT STROKE VOLUME: 73.74
EOSINOPHIL # BLD AUTO: 0.07 THOUSAND/ÂΜL (ref 0–0.61)
EOSINOPHIL NFR BLD AUTO: 0 % (ref 0–6)
FERRITIN SERPL-MCNC: 302 NG/ML (ref 24–336)
FOLATE SERPL-MCNC: 16.3 NG/ML
FRACTIONAL SHORTENING: 17 (ref 28–44)
GFR SERPL CREATININE-BSD FRML MDRD: 88 ML/MIN/1.73SQ M
GLUCOSE SERPL-MCNC: 92 MG/DL (ref 65–140)
HCT VFR BLD AUTO: 16.4 % (ref 36.5–49.3)
HGB BLD-MCNC: 5.7 G/DL (ref 12–17)
IMM GRANULOCYTES # BLD AUTO: 0.2 THOUSAND/UL (ref 0–0.2)
IMM GRANULOCYTES NFR BLD AUTO: 0 % (ref 0–2)
INTERVENTRICULAR SEPTUM IN DIASTOLE (PARASTERNAL SHORT AXIS VIEW): 1.4 CM
INTERVENTRICULAR SEPTUM: 1.4 CM (ref 0.6–1.1)
IRON SATN MFR SERPL: 15 % (ref 15–50)
IRON SATN MFR SERPL: 17 % (ref 15–50)
IRON SERPL-MCNC: 46 UG/DL (ref 50–212)
IRON SERPL-MCNC: 50 UG/DL (ref 50–212)
LAAS-AP2: 28.8 CM2
LAAS-AP4: 27.9 CM2
LDH SERPL-CCNC: 254 U/L (ref 140–271)
LEFT ATRIUM SIZE: 4.4 CM
LEFT ATRIUM VOLUME (MOD BIPLANE): 99 ML
LEFT ATRIUM VOLUME INDEX (MOD BIPLANE): 49.5 ML/M2
LEFT INTERNAL DIMENSION IN SYSTOLE: 3.9 CM (ref 2.1–4)
LEFT VENTRICULAR INTERNAL DIMENSION IN DIASTOLE: 4.7 CM (ref 3.5–6)
LEFT VENTRICULAR POSTERIOR WALL IN END DIASTOLE: 1.4 CM
LEFT VENTRICULAR STROKE VOLUME: 39 ML
LVSV (TEICH): 39 ML
LYMPHOCYTES # BLD AUTO: >100 THOUSANDS/ÂΜL (ref 0.6–4.47)
LYMPHOCYTES NFR BLD AUTO: 93 % (ref 14–44)
MCH RBC QN AUTO: 39.6 PG (ref 26.8–34.3)
MCHC RBC AUTO-ENTMCNC: 34.8 G/DL (ref 31.4–37.4)
MCV RBC AUTO: 114 FL (ref 82–98)
MONOCYTES # BLD AUTO: 1.38 THOUSAND/ÂΜL (ref 0.17–1.22)
MONOCYTES NFR BLD AUTO: 1 % (ref 4–12)
NEUTROPHILS # BLD AUTO: 7.35 THOUSANDS/ÂΜL (ref 1.85–7.62)
NEUTS SEG NFR BLD AUTO: 6 % (ref 43–75)
NRBC BLD AUTO-RTO: 1 /100 WBCS
PLATELET # BLD AUTO: 304 THOUSANDS/UL (ref 149–390)
PMV BLD AUTO: 9.7 FL (ref 8.9–12.7)
POTASSIUM SERPL-SCNC: 4.6 MMOL/L (ref 3.5–5.3)
PR INTERVAL: 154 MS
PROCALCITONIN SERPL-MCNC: 0.19 NG/ML
PROT SERPL-MCNC: 6.8 G/DL (ref 6.4–8.4)
QRS AXIS: -3 DEGREES
QRS AXIS: -31 DEGREES
QRS AXIS: -5 DEGREES
QRSD INTERVAL: 122 MS
QRSD INTERVAL: 150 MS
QRSD INTERVAL: 156 MS
QT INTERVAL: 464 MS
QT INTERVAL: 518 MS
QT INTERVAL: 518 MS
QTC INTERVAL: 528 MS
QTC INTERVAL: 559 MS
QTC INTERVAL: 562 MS
RBC # BLD AUTO: 1.44 MILLION/UL (ref 3.88–5.62)
RH BLD: POSITIVE
RIGHT ATRIUM AREA SYSTOLE A4C: 21 CM2
RIGHT VENTRICLE ID DIMENSION: 4.4 CM
SL CV AV DECELERATION TIME RETROGRADE: 1436 MS
SL CV AV PEAK GRADIENT RETROGRADE: 48 MMHG
SL CV LEFT ATRIUM LENGTH A2C: 6.6 CM
SL CV PED ECHO LEFT VENTRICLE DIASTOLIC VOLUME (MOD BIPLANE) 2D: 104 ML
SL CV PED ECHO LEFT VENTRICLE SYSTOLIC VOLUME (MOD BIPLANE) 2D: 65 ML
SODIUM SERPL-SCNC: 125 MMOL/L (ref 135–147)
SODIUM SERPL-SCNC: 128 MMOL/L (ref 135–147)
SPECIMEN EXPIRATION DATE: NORMAL
T WAVE AXIS: 136 DEGREES
T WAVE AXIS: 155 DEGREES
T WAVE AXIS: 204 DEGREES
TIBC SERPL-MCNC: 299 UG/DL (ref 250–450)
TIBC SERPL-MCNC: 301 UG/DL (ref 250–450)
TR MAX PG: 77 MMHG
TR PEAK VELOCITY: 4.4 M/S
TRICUSPID ANNULAR PLANE SYSTOLIC EXCURSION: 2.2 CM
TRICUSPID VALVE PEAK REGURGITATION VELOCITY: 4.39 M/S
UIBC SERPL-MCNC: 249 UG/DL (ref 155–355)
UIBC SERPL-MCNC: 255 UG/DL (ref 155–355)
VENTRICULAR RATE: 70 BPM
VENTRICULAR RATE: 71 BPM
VENTRICULAR RATE: 78 BPM
VIT B12 SERPL-MCNC: 1021 PG/ML (ref 180–914)
VIT B12 SERPL-MCNC: 1299 PG/ML (ref 180–914)
WBC # BLD AUTO: 125.72 THOUSAND/UL (ref 4.31–10.16)

## 2024-09-03 PROCEDURE — 83550 IRON BINDING TEST: CPT | Performed by: INTERNAL MEDICINE

## 2024-09-03 PROCEDURE — 93005 ELECTROCARDIOGRAM TRACING: CPT

## 2024-09-03 PROCEDURE — 99232 SBSQ HOSP IP/OBS MODERATE 35: CPT | Performed by: INTERNAL MEDICINE

## 2024-09-03 PROCEDURE — 85025 COMPLETE CBC W/AUTO DIFF WBC: CPT | Performed by: PHYSICIAN ASSISTANT

## 2024-09-03 PROCEDURE — 84295 ASSAY OF SERUM SODIUM: CPT | Performed by: PHYSICIAN ASSISTANT

## 2024-09-03 PROCEDURE — 82607 VITAMIN B-12: CPT | Performed by: INTERNAL MEDICINE

## 2024-09-03 PROCEDURE — 86921 COMPATIBILITY TEST INCUBATE: CPT

## 2024-09-03 PROCEDURE — 99233 SBSQ HOSP IP/OBS HIGH 50: CPT | Performed by: PHYSICIAN ASSISTANT

## 2024-09-03 PROCEDURE — 85060 BLOOD SMEAR INTERPRETATION: CPT | Performed by: PATHOLOGY

## 2024-09-03 PROCEDURE — 80076 HEPATIC FUNCTION PANEL: CPT | Performed by: PHYSICIAN ASSISTANT

## 2024-09-03 PROCEDURE — 84145 PROCALCITONIN (PCT): CPT | Performed by: STUDENT IN AN ORGANIZED HEALTH CARE EDUCATION/TRAINING PROGRAM

## 2024-09-03 PROCEDURE — 93306 TTE W/DOPPLER COMPLETE: CPT | Performed by: INTERNAL MEDICINE

## 2024-09-03 PROCEDURE — 82746 ASSAY OF FOLIC ACID SERUM: CPT | Performed by: INTERNAL MEDICINE

## 2024-09-03 PROCEDURE — 86900 BLOOD TYPING SEROLOGIC ABO: CPT | Performed by: PHYSICIAN ASSISTANT

## 2024-09-03 PROCEDURE — 83615 LACTATE (LD) (LDH) ENZYME: CPT | Performed by: PHYSICIAN ASSISTANT

## 2024-09-03 PROCEDURE — 86850 RBC ANTIBODY SCREEN: CPT | Performed by: PHYSICIAN ASSISTANT

## 2024-09-03 PROCEDURE — 86901 BLOOD TYPING SEROLOGIC RH(D): CPT | Performed by: PHYSICIAN ASSISTANT

## 2024-09-03 PROCEDURE — 86880 COOMBS TEST DIRECT: CPT | Performed by: INTERNAL MEDICINE

## 2024-09-03 PROCEDURE — 83540 ASSAY OF IRON: CPT | Performed by: PHYSICIAN ASSISTANT

## 2024-09-03 PROCEDURE — 93010 ELECTROCARDIOGRAM REPORT: CPT | Performed by: INTERNAL MEDICINE

## 2024-09-03 PROCEDURE — 86922 COMPATIBILITY TEST ANTIGLOB: CPT

## 2024-09-03 PROCEDURE — 80048 BASIC METABOLIC PNL TOTAL CA: CPT | Performed by: STUDENT IN AN ORGANIZED HEALTH CARE EDUCATION/TRAINING PROGRAM

## 2024-09-03 PROCEDURE — 82607 VITAMIN B-12: CPT | Performed by: PHYSICIAN ASSISTANT

## 2024-09-03 PROCEDURE — 82728 ASSAY OF FERRITIN: CPT | Performed by: PHYSICIAN ASSISTANT

## 2024-09-03 PROCEDURE — 83550 IRON BINDING TEST: CPT | Performed by: PHYSICIAN ASSISTANT

## 2024-09-03 PROCEDURE — 86870 RBC ANTIBODY IDENTIFICATION: CPT | Performed by: PHYSICIAN ASSISTANT

## 2024-09-03 PROCEDURE — 83540 ASSAY OF IRON: CPT | Performed by: INTERNAL MEDICINE

## 2024-09-03 PROCEDURE — 83010 ASSAY OF HAPTOGLOBIN QUANT: CPT | Performed by: INTERNAL MEDICINE

## 2024-09-03 PROCEDURE — 93306 TTE W/DOPPLER COMPLETE: CPT

## 2024-09-03 PROCEDURE — 99451 NTRPROF PH1/NTRNET/EHR 5/>: CPT | Performed by: INTERNAL MEDICINE

## 2024-09-03 RX ORDER — SODIUM CHLORIDE 1 G/1
2 TABLET ORAL
Status: COMPLETED | OUTPATIENT
Start: 2024-09-03 | End: 2024-09-04

## 2024-09-03 RX ORDER — SODIUM CHLORIDE 9 MG/ML
125 INJECTION, SOLUTION INTRAVENOUS CONTINUOUS
Status: CANCELLED | OUTPATIENT
Start: 2024-09-03

## 2024-09-03 RX ORDER — ONDANSETRON 2 MG/ML
4 INJECTION INTRAMUSCULAR; INTRAVENOUS ONCE AS NEEDED
Status: CANCELLED | OUTPATIENT
Start: 2024-09-03

## 2024-09-03 RX ORDER — SACCHAROMYCES BOULARDII 250 MG
250 CAPSULE ORAL 2 TIMES DAILY
Status: DISCONTINUED | OUTPATIENT
Start: 2024-09-03 | End: 2024-09-12 | Stop reason: HOSPADM

## 2024-09-03 RX ORDER — CHLORAL HYDRATE 500 MG
1000 CAPSULE ORAL DAILY
Status: DISCONTINUED | OUTPATIENT
Start: 2024-09-03 | End: 2024-09-12 | Stop reason: HOSPADM

## 2024-09-03 RX ORDER — ASCORBIC ACID 500 MG
250 TABLET ORAL DAILY
Status: DISCONTINUED | OUTPATIENT
Start: 2024-09-03 | End: 2024-09-12 | Stop reason: HOSPADM

## 2024-09-03 RX ADMIN — ACETAMINOPHEN 650 MG: 325 TABLET ORAL at 00:41

## 2024-09-03 RX ADMIN — Medication 250 MG: at 10:30

## 2024-09-03 RX ADMIN — SODIUM CHLORIDE 2 G: 1 TABLET ORAL at 17:52

## 2024-09-03 RX ADMIN — OMEGA-3 FATTY ACIDS CAP 1000 MG 1000 MG: 1000 CAP at 10:30

## 2024-09-03 RX ADMIN — CYANOCOBALAMIN TAB 500 MCG 500 MCG: 500 TAB at 10:30

## 2024-09-03 RX ADMIN — ALUMINUM HYDROXIDE, MAGNESIUM HYDROXIDE, AND DIMETHICONE 30 ML: 200; 20; 200 SUSPENSION ORAL at 19:24

## 2024-09-03 RX ADMIN — LORAZEPAM 0.5 MG: 0.5 TABLET ORAL at 21:28

## 2024-09-03 RX ADMIN — PANTOPRAZOLE SODIUM 40 MG: 40 TABLET, DELAYED RELEASE ORAL at 05:36

## 2024-09-03 RX ADMIN — ACETAMINOPHEN 650 MG: 325 TABLET ORAL at 21:28

## 2024-09-03 RX ADMIN — SPIRONOLACTONE 25 MG: 25 TABLET, FILM COATED ORAL at 08:44

## 2024-09-03 RX ADMIN — SODIUM CHLORIDE 2 G: 1 TABLET ORAL at 10:29

## 2024-09-03 RX ADMIN — BENZONATATE 100 MG: 100 CAPSULE ORAL at 17:56

## 2024-09-03 RX ADMIN — GUAIFENESIN 600 MG: 600 TABLET, EXTENDED RELEASE ORAL at 21:28

## 2024-09-03 RX ADMIN — ATORVASTATIN CALCIUM 20 MG: 20 TABLET, FILM COATED ORAL at 17:52

## 2024-09-03 RX ADMIN — LOSARTAN POTASSIUM 50 MG: 50 TABLET, FILM COATED ORAL at 08:44

## 2024-09-03 RX ADMIN — ZOLPIDEM TARTRATE 5 MG: 5 TABLET, COATED ORAL at 22:30

## 2024-09-03 RX ADMIN — Medication 250 MG: at 17:52

## 2024-09-03 RX ADMIN — GUAIFENESIN 600 MG: 600 TABLET, EXTENDED RELEASE ORAL at 08:45

## 2024-09-03 RX ADMIN — BENZONATATE 100 MG: 100 CAPSULE ORAL at 08:47

## 2024-09-03 RX ADMIN — DABIGATRAN ETEXILATE MESYLATE 150 MG: 150 CAPSULE ORAL at 08:45

## 2024-09-03 RX ADMIN — NADOLOL 80 MG: 20 TABLET ORAL at 05:37

## 2024-09-03 NOTE — PROGRESS NOTES
"Pt states he feels unwell. Pt will only specify that he feels \"dizzy on and off\" which pt states has been ongoing since admission. Vitals stable. Pt concerned about hemoglobin levels, but pt made aware CBC still pending. Imelda MORLEY made aware of all above. Will continue to monitor.     "

## 2024-09-03 NOTE — ASSESSMENT & PLAN NOTE
Currently follow with Dr. Hanley, not on any current medications  H&H of 8.7 today, previous baseline of 10-11  Continue to monitor at this time while on pradaxa without evidence of bleeding  Follow-up a.m. labs  Evidence of splenomegaly on CT, Check abdominal ultrasound

## 2024-09-03 NOTE — PROGRESS NOTES
"Progress Note - Pulmonary   Murphy Willams 73 y.o. male MRN: [id number]  Unit/Bed#: [place] [address] -01 Encounter: [id number]    Assessment/Plan:    1.  Acute pulmonary insufficiency likely multifaceted as listed below        -Currently on room air 99%, not wear home O2        -Continue sats greater than 88%        -Pulmonary toileting: Deep breathing cough out of bed as tolerated incentive spirometry    2.  Abnormal chest CT        -RML infiltrate        -Differential: Drug-resistant PNA vs lymphocytic infiltrate        -s/p 7 days of Augmentin, 2 days of cefepime        -Procalcitonin negative x 2        -Monitor off antibiotics for now        -Sputum pending        -Repeat chest CT 4 to 6 weeks-if infiltrate is persistent will need bronchoscopy    3. CLL- 2010       -Follows with Dr. Hanley       -Never required therapy       -Surveillance for CLL    4.  Chronic systolic/diastolic CHF w/ A-fib        -8/21/2023-EF 45%           -Maintained on Pradaxa        -Repeat echo pending      Pulmonary will sign off  Our office will call with scheduled follow-up    Chief Complaint:    \"I just dont feel great\"    Subjective:    Demetrio is comfortably sitting in his bed.  No significant overnight events reported.  Currently denying any fevers, chills, night sweats, pleuritic chest pain, or palpitations.    Objective:    Vitals: Blood pressure 115/56, pulse 70, temperature 98.1 °F (36.7 °C), temperature source Oral, resp. rate 18, height 5' 8\" (1.727 m), weight 86.6 kg (190 lb 14.7 oz), SpO2 99%.,Body mass index is 29.03 kg/m².      Intake/Output Summary (Last 24 hours) at 9/3/2024 0941  Last data filed at 9/3/2024 0851  Gross per 24 hour   Intake 658 ml   Output 850 ml   Net -192 ml       Invasive Devices       Peripheral Intravenous Line  Duration             Peripheral IV 09/02/24 Left Forearm 1 day                    Physical Exam:     Physical Exam  Constitutional:       General: He is not in acute distress.     " "Appearance: Normal appearance. He is normal weight. He is not ill-appearing.   HENT:      Head: Normocephalic and atraumatic.      Nose: Nose normal. No congestion or rhinorrhea.   Cardiovascular:      Rate and Rhythm: Normal rate and regular rhythm.      Pulses: Normal pulses.      Heart sounds: Normal heart sounds. No murmur heard.     No friction rub. No gallop.   Pulmonary:      Effort: Pulmonary effort is normal. No respiratory distress.      Breath sounds: No stridor. No wheezing, rhonchi or rales.      Comments: Diminished aeration at bases  Chest:      Chest wall: No tenderness.   Abdominal:      General: Abdomen is flat. Bowel sounds are normal. There is no distension.      Palpations: Abdomen is soft. There is no mass.   Musculoskeletal:         General: No swelling or tenderness. Normal range of motion.      Cervical back: Normal range of motion. No rigidity or tenderness.   Skin:     General: Skin is warm and dry.      Coloration: Skin is not jaundiced or pale.   Neurological:      General: No focal deficit present.      Mental Status: He is alert and oriented to person, place, and time. Mental status is at baseline.         Labs: I have personally reviewed pertinent lab results., ABG: No results found for: \"PHART\", \"BOY5SMC\", \"PO2ART\", \"DYN7YCA\", \"E6PNYOQQ\", \"BEART\", \"SOURCE\", BNP: No results found for: \"BNP\", CBC: No results found for: \"WBC\", \"HGB\", \"HCT\", \"MCV\", \"PLT\", \"ADJUSTEDWBC\", \"RBC\", \"MCH\", \"MCHC\", \"RDW\", \"MPV\", \"NRBC\", CMP:   Lab Results   Component Value Date    SODIUM 125 (L) 09/03/2024    K 4.6 09/03/2024    CL 96 09/03/2024    CO2 21 09/03/2024    BUN 17 09/03/2024    CREATININE 0.84 09/03/2024    CALCIUM 8.4 09/03/2024    EGFR 88 09/03/2024   , PT/INR: No results found for: \"PT\", \"INR\"      Imaging and other studies: I have personally reviewed pertinent films in PACS    CTA chest right middle lobe pneumonia  "

## 2024-09-03 NOTE — QUICK NOTE
Hemoglobin 5.7  Does have history of CLL diagnosed in 2010 currently undergoing surveillance outpatient with Dr. Hanley.   History of severe microcytic anemia in April 2021 thought to be due to autoimmune hemolytic anemia. At that time he was treated with high dose steroids and blood transfusions.     Consent obtained, type and screen ordered. Will order 2 units PRBCs now, unfortunately has had antibodies in the past which may delay blood delivery. Continue supportive measures in the meantime.     Discussed with hematology, Dr. Gannon, will continue transfusing to a hemoglobin >7 pending further work up. Hemolytic labs, B12 and iron panel pending.

## 2024-09-03 NOTE — ASSESSMENT & PLAN NOTE
73-year-old male with PMH of CLL, A-fib, and HTN presented with cough  Recently diagnosed with pneumonia with his PCP and completed course of antibiotics with Augmentin.  Follow-up with pulmonology, given Tessalon Perles with no significant improvement  Presented to the ED with worsening cough, with CT imaging revealing right middle lobe pneumonia  Concern for possible failure with recent antibiotics Augmentin, though has been afebrile.  Does have significantly elevated white count with known history of CLL  Received empiric IV cefepime, per pulmonology will hold off on further doses of antibiotics  Procalcitonin negative x 2  Repeat CBC this morning  Plan for outpatient repeat CT scan in 4 to 6 weeks, may require bronchoscopy if no improvement

## 2024-09-03 NOTE — PROGRESS NOTES
RN spoke to Imelda with [facility] regarding Pradaxa. Plan to hold this evening's dose in light of low hemoglobin. Will continue to monitor.

## 2024-09-03 NOTE — PLAN OF CARE
Problem: PAIN - ADULT  Goal: Verbalizes/displays adequate comfort level or baseline comfort level  Description: Interventions:  - Encourage patient to monitor pain and request assistance  - Assess pain using appropriate pain scale  - Administer analgesics based on type and severity of pain and evaluate response  - Implement non-pharmacological measures as appropriate and evaluate response  - Consider cultural and social influences on pain and pain management  - Notify physician/advanced practitioner if interventions unsuccessful or patient reports new pain  Outcome: Progressing     Problem: INFECTION - ADULT  Goal: Absence or prevention of progression during hospitalization  Description: INTERVENTIONS:  - Assess and monitor for signs and symptoms of infection  - Monitor lab/diagnostic results  - Monitor all insertion sites, i.e. indwelling lines, tubes, and drains  - Monitor endotracheal if appropriate and nasal secretions for changes in amount and color  - [place] appropriate cooling/warming therapies per order  - Administer medications as ordered  - Instruct and encourage patient and family to use good hand hygiene technique  - Identify and instruct in appropriate isolation precautions for identified infection/condition  Outcome: Progressing  Goal: Absence of fever/infection during neutropenic period  Description: INTERVENTIONS:  - Monitor WBC    Outcome: Progressing     Problem: SAFETY ADULT  Goal: Patient will remain free of falls  Description: INTERVENTIONS:  - Educate patient/family on patient safety including physical limitations  - Instruct patient to call for assistance with activity   - Consult OT/PT to assist with strengthening/mobility   - Keep Call bell within reach  - Keep bed low and locked with side rails adjusted as appropriate  - Keep care items and personal belongings within reach  - Initiate and maintain comfort rounds  - Make Fall Risk Sign visible to staff  - Offer Toileting every 2 Hours,  in advance of need  - Apply yellow socks and bracelet for high fall risk patients  - Consider moving patient to room near nurses station  Outcome: Progressing  Goal: Maintain or return to baseline ADL function  Description: INTERVENTIONS:  -  Assess patient's ability to carry out ADLs; assess patient's baseline for ADL function and identify physical deficits which impact ability to perform ADLs (bathing, care of mouth/teeth, toileting, grooming, dressing, etc.)  - Assess/evaluate cause of self-care deficits   - Assess range of motion  - Assess patient's mobility; develop plan if impaired  - Assess patient's need for assistive devices and provide as appropriate  - Encourage maximum independence but intervene and supervise when necessary  - Involve family in performance of ADLs  - Assess for home care needs following discharge   - Consider OT consult to assist with ADL evaluation and planning for discharge  - Provide patient education as appropriate  Outcome: Progressing  Goal: Maintains/Returns to pre admission functional level  Description: INTERVENTIONS:  - Perform AM-PAC 6 Click Basic Mobility/ Daily Activity assessment daily.  - Set and communicate daily mobility goal to care team and patient/family/caregiver.   - Collaborate with rehabilitation services on mobility goals if consulted  - Perform Range of Motion 3 times a day.  - Reposition patient every 2 hours.  - Out of bed for toileting  - Record patient progress and toleration of activity level   Outcome: Progressing     Problem: DISCHARGE PLANNING  Goal: Discharge to home or other facility with appropriate resources  Description: INTERVENTIONS:  - Identify barriers to discharge w/patient and caregiver  - Arrange for needed discharge resources and transportation as appropriate  - Identify discharge learning needs (meds, wound care, etc.)  - Arrange for interpretive services to assist at discharge as needed  - Refer to Case Management Department for  coordinating discharge planning if the patient needs post-hospital services based on physician/advanced practitioner order or complex needs related to functional status, cognitive ability, or social support system  Outcome: Progressing     Problem: Knowledge Deficit  Goal: Patient/family/caregiver demonstrates understanding of disease process, treatment plan, medications, and discharge instructions  Description: Complete learning assessment and assess knowledge base.  Interventions:  - Provide teaching at level of understanding  - Provide teaching via preferred learning methods  Outcome: Progressing

## 2024-09-03 NOTE — ASSESSMENT & PLAN NOTE
Continue nadolol and pradaxa  CTA showing lack of opacification of the left atrial appendage, cannot r/o thrombus-check echo  Continue anticoagulation

## 2024-09-03 NOTE — ASSESSMENT & PLAN NOTE
Hemoglobin 8.7 from baseline 9-12   Recheck this AM   Consider transfusion vs hematology consult pending AM blood work   Continue B12 supplement   Check iron panel

## 2024-09-03 NOTE — ASSESSMENT & PLAN NOTE
Sodium 125 this morning  Appears acute on chronic  Question component of SIADH in the setting of pneumonia and antibiotic use  Start salt tabs  Recheck in a.m.

## 2024-09-03 NOTE — ASSESSMENT & PLAN NOTE
BP controlled  Home meds nadolol, losartan and aldactone  Hold Aldactone for now given hyponatremia

## 2024-09-03 NOTE — CONSULTS
e-Consult ([facility])   Murphy Willams 73 y.o. male MRN: [id number]  Unit/Bed#: [place] [address] -01 Encounter: [id number]      Reason for Consult / Principal Problem: CLL  Inpatient consult to Hematology  Consult performed by: Kalen Gannon MD  Consult ordered by: Imelda Reno PA-C        09/03/24      ASSESSMENT:  This is a 73-year-old male with multiple comorbid conditions including depression, diverticulitis, hyperlipidemia and CLL.  His diagnosis of CLL was apparently made around 2010.  He has been under the supervision of Dr. Hanley who has been monitoring his CLL without treatment since there was no indication for treatment.  He did have profound autoimmune hemolytic anemia around 2021 was treated with steroids.  The patient apparently was admitted to the hospital after he had what is seems to be allergic reaction to Augmentin.  He was treated by his PCP about 2 weeks ago prior to admission for the diagnosis of pneumonia.  His white cell count is usually around 30-40k.  However during the last month or so it did go up to above 100,000.  Blood work from yesterday showed white cell count of 113 with hemoglobin of 8.7 and a platelet count of 347.   The white cell differential showed mild increase of the absolute neutrophil count.  The absolute lymphocyte count is 104,000.    RECOMMENDATIONS:  1.  CLL: This is a well-established diagnosis since 2010.  The quick doubling time of the lymphocytes within a month is concerning for progression of his CLL.  However, reactive etiology needs to be also considered.  The patient should be treated for the current medical issues during this hospital stay and then he needs to follow-up with his primary oncologist, Dr. Hanley for close monitoring of his CLL and white cell count.  No indication for treatment at this point.    2.  Macrocytic anemia: He did have a history of autoimmune hemolytic anemia in the past.  I think it would be appropriate to order the hemolysis workup for  completeness sake.  Vitamin B12 and folic acid deficiency needs to be ruled out as well.     21-30 minutes, >50% of the total time devoted to medical consultative verbal/EMR discussion between providers. Written report will be generated in the EMR.    Kalen Gannon MD

## 2024-09-03 NOTE — PROGRESS NOTES
[facility]  Progress Note  Name: Murphy iWllams I  MRN: [id number]  Unit/Bed#: [place] [address] -01 I Date of Admission: 9/2/2024   Date of Service: 9/3/2024 I Hospital Day: 1    Assessment & Plan   * Pneumonia  Assessment & Plan  73-year-old male with PMH of CLL, A-fib, and HTN presented with cough  Recently diagnosed with pneumonia with his PCP and completed course of antibiotics with Augmentin.  Follow-up with pulmonology, given Tessalon Perles with no significant improvement  Presented to the ED with worsening cough, with CT imaging revealing right middle lobe pneumonia  Concern for possible failure with recent antibiotics Augmentin, though has been afebrile.  Does have significantly elevated white count with known history of CLL  Received empiric IV cefepime, per pulmonology will hold off on further doses of antibiotics  Procalcitonin negative x 2  Repeat CBC this morning  Plan for outpatient repeat CT scan in 4 to 6 weeks, may require bronchoscopy if no improvement    Anemia  Assessment & Plan  Hemoglobin 8.7 from baseline 9-12   Recheck this AM   Consider transfusion vs hematology consult pending AM blood work   Continue B12 supplement   Check iron panel    Rash  Assessment & Plan  Developed a rash shortly after completing Augmentin therapy  Continue to monitor with supportive care time    Vitamin B 12 deficiency  Assessment & Plan  Restart B12 supplement    Long QT syndrome type 2  Assessment & Plan  Avoid QT prolonging agents    Paroxysmal A-fib (HCC)  Assessment & Plan  Continue nadolol and pradaxa  CTA showing lack of opacification of the left atrial appendage, cannot r/o thrombus-check echo  Continue anticoagulation      Hyponatremia  Assessment & Plan  Sodium 125 this morning  Appears acute on chronic  Question component of SIADH in the setting of pneumonia and antibiotic use  Start salt tabs  Recheck in a.m.    CLL (chronic lymphocytic leukemia) (HCC)  Assessment &  Plan  Currently follow with Dr. Hanley, not on any current medications  H&H of 8.7 today, previous baseline of 10-11  Continue to monitor at this time while on pradaxa without evidence of bleeding  Follow-up a.m. labs  Evidence of splenomegaly on CT, Check abdominal ultrasound    Essential hypertension  Assessment & Plan  BP controlled  Home meds nadolol, losartan and aldactone  Hold Aldactone for now given hyponatremia             VTE Pharmacologic Prophylaxis:   High Risk (Score >/= 5) - Pharmacological DVT Prophylaxis Ordered: dabigatran (Pradaxa). Sequential Compression Devices Ordered.    Mobility:   Basic Mobility Inpatient Raw Score: 18  JH-HLM Goal: 6: Walk 10 steps or more  JH-HLM Achieved: 7: Walk 25 feet or more  JH-HLM Goal achieved. Continue to encourage appropriate mobility.    Patient Centered Rounds: I performed bedside rounds with nursing staff today.   Discussions with Specialists or Other Care Team Provider: pulm    Education and Discussions with Family / Patient: Updated  (wife) at bedside.    Total Time Spent on Date of Encounter in care of patient:  This time was spent on one or more of the following: performing physical exam; counseling and coordination of care; obtaining or reviewing history; documenting in the medical record; reviewing/ordering tests, medications or procedures; communicating with other healthcare professionals and discussing with patient's family/caregivers.    Current Length of Stay: 1 day(s)  Current Patient Status: Inpatient   Certification Statement: The patient will continue to require additional inpatient hospital stay due to hyponatremia, anemia  Discharge Plan: Anticipate discharge in 48-72 hrs to home.    Code Status: Level 1 - Full Code    Subjective:   Patient notes he is not feeling well today. Still with intermittent coughing fits. Also notes some SMITH which he did notice last time he was anemic. Complains of some dizziness/lightheadednesss.      Objective:     Vitals:   Temp (24hrs), Av.4 °F (36.9 °C), Min:97.9 °F (36.6 °C), Max:99.1 °F (37.3 °C)    Temp:  [97.9 °F (36.6 °C)-99.1 °F (37.3 °C)] 98.1 °F (36.7 °C)  HR:  [69-75] 70  Resp:  [18] 18  BP: (104-128)/(56-68) 115/56  SpO2:  [96 %-99 %] 99 %  Body mass index is 29.03 kg/m².     Input and Output Summary (last 24 hours):     Intake/Output Summary (Last 24 hours) at 9/3/2024 1010  Last data filed at 9/3/2024 0851  Gross per 24 hour   Intake 658 ml   Output 850 ml   Net -192 ml       Physical Exam:   Physical Exam  Vitals reviewed.   Constitutional:       General: He is not in acute distress.  HENT:      Head: Normocephalic and atraumatic.   Eyes:      General: No scleral icterus.     Conjunctiva/sclera: Conjunctivae normal.   Cardiovascular:      Rate and Rhythm: Normal rate and regular rhythm.      Heart sounds: No murmur heard.  Pulmonary:      Effort: Pulmonary effort is normal.      Breath sounds: Normal breath sounds.   Abdominal:      General: Bowel sounds are normal. There is no distension.      Palpations: Abdomen is soft.      Tenderness: There is no abdominal tenderness.   Musculoskeletal:      Cervical back: Neck supple.      Right lower leg: No edema.      Left lower leg: No edema.   Skin:     General: Skin is warm and dry.      Coloration: Skin is pale.   Neurological:      Mental Status: He is alert and oriented to person, place, and time.   Psychiatric:         Mood and Affect: Mood normal.         Behavior: Behavior normal.          Additional Data:     Labs:  Results from last 7 days   Lab Units 24  0733   WBC Thousand/uL 113.22*   HEMOGLOBIN g/dL 8.7*   HEMATOCRIT % 24.1*   PLATELETS Thousands/uL 347   BANDS PCT % 2   LYMPHO PCT % 78*   MONO PCT % 0*   EOS PCT % 0     Results from last 7 days   Lab Units 24  0425 24  0733   SODIUM mmol/L 125* 129*   POTASSIUM mmol/L 4.6 4.9   CHLORIDE mmol/L 96 98   CO2 mmol/L 21 25   BUN mg/dL 17 14   CREATININE mg/dL 0.84  0.83   ANION GAP mmol/L 8 6   CALCIUM mg/dL 8.4 8.9   ALBUMIN g/dL  --  4.4   TOTAL BILIRUBIN mg/dL  --  1.54*   ALK PHOS U/L  --  105*   ALT U/L  --  21   AST U/L  --  16   GLUCOSE RANDOM mg/dL 92 128                 Results from last 7 days   Lab Units 09/03/24  0425 09/02/24  0733   PROCALCITONIN ng/ml 0.19 0.06       Lines/Drains:  Invasive Devices       Peripheral Intravenous Line  Duration             Peripheral IV 09/02/24 Left Forearm 1 day                          Imaging: Reviewed radiology reports from this admission including: chest CT scan    Recent Cultures (last 7 days):   Results from last 7 days   Lab Units 09/02/24  1655 09/02/24  1259   GRAM STAIN RESULT  1+ Epithelial cells per low power field*  2+ Gram positive cocci in pairs*  2+ Gram variable rods*  No polys seen*  --    LEGIONELLA URINARY ANTIGEN   --  Negative       Last 24 Hours Medication List:   Current Facility-Administered Medications   Medication Dose Route Frequency Provider Last Rate    acetaminophen  650 mg Oral Q6H PRN Cyril Nick MD      albuterol  2 puff Inhalation Q4H PRN Cyril Nick MD      aluminum-magnesium hydroxide-simethicone  30 mL Oral Q6H PRN Cyril Nick MD      ascorbic acid  250 mg Oral Daily Imelda Reno PA-C      atorvastatin  20 mg Oral Daily With Dinner Cyril Nick MD      benzonatate  100 mg Oral TID PRN Cyril Nick MD      vitamin B-12  500 mcg Oral Daily Imelda Reno PA-C      dabigatran etexilate  150 mg Oral BID Cyril Nick MD      fish oil  1,000 mg Oral Daily Imelda Reno PA-C      guaiFENesin  600 mg Oral BID Cyril Nick MD      LORazepam  0.5 mg Oral HS PRN Cyril Nick MD      losartan  50 mg Oral Daily Cyril Nick MD      montelukast  10 mg Oral Daily Cyril Nick MD      nadolol  80 mg Oral Early Morning Cyril Nick MD      pantoprazole  40 mg Oral Early Morning Cyril Nick MD      saccharomyces boulardii  250 mg Oral BID Imelda Reno  ALISHA      sodium chloride  2 g Oral TID With Meals Imelda Reno PA-C      trimethobenzamide  200 mg Intramuscular Q6H PRN Cyril Nick MD      zolpidem  5 mg Oral HS Cyril Nick MD          Today, Patient Was Seen By: Imelda Reno PA-C    **Please Note: This note may have been constructed using a voice recognition system.**

## 2024-09-04 ENCOUNTER — APPOINTMENT (INPATIENT)
Dept: CT IMAGING | Facility: HOSPITAL | Age: 74
DRG: 841 | End: 2024-09-04
Payer: MEDICARE

## 2024-09-04 ENCOUNTER — APPOINTMENT (INPATIENT)
Dept: ULTRASOUND IMAGING | Facility: HOSPITAL | Age: 74
DRG: 841 | End: 2024-09-04
Payer: MEDICARE

## 2024-09-04 LAB
ANION GAP SERPL CALCULATED.3IONS-SCNC: 8 MMOL/L (ref 4–13)
ATRIAL RATE: 58 BPM
BACTERIA SPT RESP CULT: ABNORMAL
BLOOD GROUP ANTIBODIES SERPL: NORMAL
BLOOD GROUP ANTIBODIES SERPL: NORMAL
BUN SERPL-MCNC: 20 MG/DL (ref 5–25)
CALCIUM SERPL-MCNC: 8.5 MG/DL (ref 8.4–10.2)
CHLORIDE SERPL-SCNC: 100 MMOL/L (ref 96–108)
CO2 SERPL-SCNC: 24 MMOL/L (ref 21–32)
CREAT SERPL-MCNC: 0.87 MG/DL (ref 0.6–1.3)
DAT POLY-SP REAG RBC QL: NORMAL
GFR SERPL CREATININE-BSD FRML MDRD: 86 ML/MIN/1.73SQ M
GLUCOSE SERPL-MCNC: 111 MG/DL (ref 65–140)
GRAM STN SPEC: ABNORMAL
HAPTOGLOB SERPL-MCNC: 83 MG/DL (ref 34–355)
HCT VFR BLD AUTO: 18.9 % (ref 36.5–49.3)
HGB BLD-MCNC: 6.4 G/DL (ref 12–17)
LDH SERPL-CCNC: 234 U/L (ref 140–271)
MCH RBC QN AUTO: 40.8 PG (ref 26.8–34.3)
MCHC RBC AUTO-ENTMCNC: 33.9 G/DL (ref 31.4–37.4)
MCV RBC AUTO: 120 FL (ref 82–98)
MRSA NOSE QL CULT: NORMAL
PLATELET # BLD AUTO: 312 THOUSANDS/UL (ref 149–390)
PMV BLD AUTO: 9.6 FL (ref 8.9–12.7)
POTASSIUM SERPL-SCNC: 4.9 MMOL/L (ref 3.5–5.3)
QRS AXIS: -32 DEGREES
QRSD INTERVAL: 160 MS
QT INTERVAL: 540 MS
QTC INTERVAL: 583 MS
RBC # BLD AUTO: 1.57 MILLION/UL (ref 3.88–5.62)
SCAN RESULT: NORMAL
SODIUM SERPL-SCNC: 132 MMOL/L (ref 135–147)
T WAVE AXIS: 118 DEGREES
VENTRICULAR RATE: 70 BPM
WBC # BLD AUTO: 128.05 THOUSAND/UL (ref 4.31–10.16)

## 2024-09-04 PROCEDURE — 85027 COMPLETE CBC AUTOMATED: CPT | Performed by: PHYSICIAN ASSISTANT

## 2024-09-04 PROCEDURE — 99233 SBSQ HOSP IP/OBS HIGH 50: CPT | Performed by: INTERNAL MEDICINE

## 2024-09-04 PROCEDURE — 99222 1ST HOSP IP/OBS MODERATE 55: CPT | Performed by: INTERNAL MEDICINE

## 2024-09-04 PROCEDURE — 75572 CT HRT W/3D IMAGE: CPT

## 2024-09-04 PROCEDURE — 80048 BASIC METABOLIC PNL TOTAL CA: CPT | Performed by: PHYSICIAN ASSISTANT

## 2024-09-04 PROCEDURE — 93010 ELECTROCARDIOGRAM REPORT: CPT | Performed by: STUDENT IN AN ORGANIZED HEALTH CARE EDUCATION/TRAINING PROGRAM

## 2024-09-04 PROCEDURE — 76700 US EXAM ABDOM COMPLETE: CPT

## 2024-09-04 PROCEDURE — 93005 ELECTROCARDIOGRAM TRACING: CPT

## 2024-09-04 PROCEDURE — P9040 RBC LEUKOREDUCED IRRADIATED: HCPCS

## 2024-09-04 PROCEDURE — 83615 LACTATE (LD) (LDH) ENZYME: CPT | Performed by: INTERNAL MEDICINE

## 2024-09-04 RX ORDER — PREDNISONE 20 MG/1
20 TABLET ORAL DAILY
Status: DISCONTINUED | OUTPATIENT
Start: 2024-09-21 | End: 2024-09-11

## 2024-09-04 RX ORDER — PREDNISONE 20 MG/1
60 TABLET ORAL DAILY
Status: DISCONTINUED | OUTPATIENT
Start: 2024-09-07 | End: 2024-09-11

## 2024-09-04 RX ORDER — PREDNISONE 20 MG/1
40 TABLET ORAL DAILY
Status: DISCONTINUED | OUTPATIENT
Start: 2024-09-14 | End: 2024-09-11

## 2024-09-04 RX ORDER — PREDNISONE 10 MG/1
10 TABLET ORAL DAILY
Status: DISCONTINUED | OUTPATIENT
Start: 2024-09-28 | End: 2024-09-11

## 2024-09-04 RX ORDER — METHYLPREDNISOLONE SODIUM SUCCINATE 40 MG/ML
40 INJECTION, POWDER, LYOPHILIZED, FOR SOLUTION INTRAMUSCULAR; INTRAVENOUS EVERY 12 HOURS SCHEDULED
Status: COMPLETED | OUTPATIENT
Start: 2024-09-04 | End: 2024-09-06

## 2024-09-04 RX ADMIN — PANTOPRAZOLE SODIUM 40 MG: 40 TABLET, DELAYED RELEASE ORAL at 05:54

## 2024-09-04 RX ADMIN — METHYLPREDNISOLONE SODIUM SUCCINATE 40 MG: 40 INJECTION, POWDER, FOR SOLUTION INTRAMUSCULAR; INTRAVENOUS at 22:38

## 2024-09-04 RX ADMIN — SODIUM CHLORIDE 2 G: 1 TABLET ORAL at 10:29

## 2024-09-04 RX ADMIN — Medication 250 MG: at 17:36

## 2024-09-04 RX ADMIN — MONTELUKAST 10 MG: 10 TABLET, FILM COATED ORAL at 10:30

## 2024-09-04 RX ADMIN — Medication 250 MG: at 10:29

## 2024-09-04 RX ADMIN — BENZONATATE 100 MG: 100 CAPSULE ORAL at 22:50

## 2024-09-04 RX ADMIN — BENZONATATE 100 MG: 100 CAPSULE ORAL at 12:59

## 2024-09-04 RX ADMIN — ATORVASTATIN CALCIUM 20 MG: 20 TABLET, FILM COATED ORAL at 17:36

## 2024-09-04 RX ADMIN — LORAZEPAM 0.5 MG: 0.5 TABLET ORAL at 20:19

## 2024-09-04 RX ADMIN — OMEGA-3 FATTY ACIDS CAP 1000 MG 1000 MG: 1000 CAP at 10:30

## 2024-09-04 RX ADMIN — GUAIFENESIN 600 MG: 600 TABLET, EXTENDED RELEASE ORAL at 10:29

## 2024-09-04 RX ADMIN — CYANOCOBALAMIN TAB 500 MCG 500 MCG: 500 TAB at 10:29

## 2024-09-04 RX ADMIN — GUAIFENESIN 600 MG: 600 TABLET, EXTENDED RELEASE ORAL at 22:38

## 2024-09-04 RX ADMIN — ZOLPIDEM TARTRATE 5 MG: 5 TABLET, COATED ORAL at 22:38

## 2024-09-04 RX ADMIN — Medication 250 MG: at 10:30

## 2024-09-04 RX ADMIN — IOHEXOL 100 ML: 350 INJECTION, SOLUTION INTRAVENOUS at 15:01

## 2024-09-04 RX ADMIN — METHYLPREDNISOLONE SODIUM SUCCINATE 40 MG: 40 INJECTION, POWDER, FOR SOLUTION INTRAMUSCULAR; INTRAVENOUS at 12:58

## 2024-09-04 NOTE — ASSESSMENT & PLAN NOTE
Currently follow with Dr. Hanley, not on any current medications  WBCs elevated at 128 today  Continue to monitor at this time while on pradaxa without evidence of bleeding  Abdominal ultrasound confirming splenomegaly

## 2024-09-04 NOTE — ASSESSMENT & PLAN NOTE
Severe pulmonary hypertension and tricuspid regurgitation noted on echocardiogram  Cardiology following

## 2024-09-04 NOTE — CONSULTS
Consultation - General Cardiology  Murphy Willams 73 y.o. male MRN: [id number]  Unit/Bed#: [place] [address] -[id number] Encounter: [id number]          Inpatient consult to Cardiology     Date/Time  9/4/2024 1:27 PM     Performed by  Rox Quach DO   Authorized by  Imelda Reno PA-C           PCP: Pravin Ortega Jr, MD   Outpatient Cardiologist: Dr. Bueno    History of Present Illness   Physician Requesting Consult: Titus Portillo, *  Reason for Consult / Principal Problem: New echo findings     HPI: Murphy Willams is a 73 y.o. year old male with a history of long QT syndrome 2, paroxysmal A-fib/flutter, paroxysmal SVT, torsades and V-fib episode in 2023, nonischemic cardiomyopathy with EF of 30%, CLL, history of heavy alcohol use, hyperlipidemia who presented to the emergency department due to persistent cough.  Patient had been evaluated by his PCP approximately 2 weeks ago and was diagnosed with pneumonia.  He has completed his antibiotic course with Augmentin.  He had a persistent cough and was evaluated by pulmonology as an outpatient and prescribed Tessalon Perles.  Unfortunately this did not improve his cough and patient presented to the emergency department.      On arrival to the emergency department patient was afebrile, heart rate 78, respiratory rate 20, /71 and saturating 99% on room air.  CT PE study demonstrated right middle lobe pneumonia, no PE, lack of opacification of left atrial appendage cannot be determined if it stated nonopacification blood in the nondependent portion of the left atrium or left atrial appendage thrombus.  Initial labs demonstrated sodium of 129, alk phos 105, T. bili 1.54, troponin 8, 9.  BNP was 385, procalcitonin 0.06.  CBC demonstrated white count of 113.22, hemoglobin 8.7, .  Repeat hemoglobin the next day was 5.7.  Hematology oncology was consulted and recommended hemolytic workup, transfuse for hemoglobin less than 7, B12 and iron panel.   Transthoracic echo was performed and demonstrated EF of 55%, mildly dilated RV, severe biatrial enlargement, mild AI, mild MR, severe TR with estimated RVSP 74 mmHg.     At this time he denies any chest pain, shortness of breath, lower extremity edema.  He admits to a cough.    Review of Systems  ROS as noted above.       Historical Information   Past Medical History:   Diagnosis Date    Anxiety     BPH (benign prostatic hypertrophy)     Cancer (HCC)     CLL    CLL (chronic lymphocytic leukemia) ([facility])     2009    Colon polyp     Concussion     Resolved: 08/22/16    COVID-19 12/29/2023    Depression     Diverticulitis 01/13/2020 2014 CT done 11/19 12/19 CT done     E coli bacteremia 06/27/2021 2/2 blood cultures with Ecoli  Source appears to be the urine     Epididymitis 05/19/2021 5/2021    Gastrointestinal hemorrhage     Last assessed: 08/27/13    GERD (gastroesophageal reflux disease)     H/O vitamin D deficiency 07/07/2021    Hearing loss of aging     Hiatal hernia     History of right hip replacement 04/19/2021    History of transfusion     Hyperlipidemia     Hypertension     Hyponatremia 03/06/2023    Iron deficiency anemia     Microscopic hematuria     Last assessed: 06/28/13    OA (osteoarthritis)     right hip    Obesity (BMI 30.0-34.9) 04/07/2022    Pneumothorax     Primary osteoarthritis of right hip 09/29/2017    He is status post right hip arthroplasty    Prostatitis     Pulmonary hypertension ([facility])     QT prolongation     Seasonal allergies     Sepsis ([facility]) 03/06/2023    Unresponsive episode 03/07/2023    Urinary tract infection associated with catheterization of urinary tract  ([facility]) 02/05/2021    Pseudomonal UTI asymptomatic.  Per Urology do not treat at this time.    Urinary tract infection associated with catheterization of urinary tract  ([facility]) 02/05/2021    Pseudomonal UTI asymptomatic.  Per Urology do not treat at this time.  He did have urosepsis from E coli 7/21    UTI (urinary tract  "infection)     in past    Ventricular fibrillation (HCC) 2023    Noted after unresponsive episode      Wears glasses      Past Surgical History:   Procedure Laterality Date    ADENOIDECTOMY      BLADDER SURGERY      CARDIAC CATHETERIZATION Left 2023    Procedure: Cardiac Left Heart Cath;  Surgeon: Rich Ramirez MD;  Location: [facility];  Service: Cardiology    CARDIAC CATHETERIZATION N/A 2023    Procedure: Cardiac temporary pacemaker;  Surgeon: Rich Ramirez MD;  Location: [facility];  Service: Cardiology    CARDIAC ELECTROPHYSIOLOGY PROCEDURE N/A 2023    Procedure: Cardiac icd implant;  Surgeon: Filiberto Olsen MD;  Location: [facility];  Service: Cardiology    COLONOSCOPY      CYSTOSCOPY  10/09/2014    Diagnostic    CYSTOSCOPY  2020    FL CYSTOGRAM  08/15/2022    FRACTURE SURGERY      left lower arm    FRACTURE SURGERY      left femur    HERNIA REPAIR      JOINT REPLACEMENT Right     hip    OTHER SURGICAL HISTORY  2011    Spinal anesthesia epidural    SC ARTHRP ACETBLR/PROX FEM PROSTC AGRFT/ALGRFT Right 2017    Procedure: ARTHROPLASTY HIP TOTAL ANTERIOR;  Surgeon: Roly Liu MD;  Location: [facility];  Service: Orthopedics    TONSILLECTOMY AND ADENOIDECTOMY      TRANSURETHRAL RESECTION OF PROSTATE      x 2    WRIST SURGERY       Social History     Substance and Sexual Activity   Alcohol Use Yes    Alcohol/week: 10.0 standard drinks of alcohol    Types: 10 Cans of beer per week    Comment: socially     Social History     Substance and Sexual Activity   Drug Use Not Currently    Types: Marijuana    Comment: \"medical marijuana\"     Social History     Tobacco Use   Smoking Status Former    Current packs/day: 0.00    Types: Cigarettes    Quit date: 1980    Years since quittin.0    Passive exposure: Past   Smokeless Tobacco Never     Family History:   Family History   Problem Relation Age of Onset    No Known Problems Mother     Heart attack " Father     Diabetes Brother     Prostate cancer Brother        Meds/Allergies   [facility] Medications:   Current Facility-Administered Medications   Medication Dose Route Frequency    acetaminophen (TYLENOL) tablet 650 mg  650 mg Oral Q6H PRN    albuterol (PROVENTIL HFA,VENTOLIN HFA) inhaler 2 puff  2 puff Inhalation Q4H PRN    aluminum-magnesium hydroxide-simethicone (MAALOX) oral suspension 30 mL  30 mL Oral Q6H PRN    ascorbic acid (VITAMIN C) tablet 250 mg  250 mg Oral Daily    atorvastatin (LIPITOR) tablet 20 mg  20 mg Oral Daily With Dinner    benzonatate (TESSALON PERLES) capsule 100 mg  100 mg Oral TID PRN    cyanocobalamin (VITAMIN B-12) tablet 500 mcg  500 mcg Oral Daily    dabigatran etexilate (PRADAXA) capsule 150 mg  150 mg Oral BID    fish oil capsule 1,000 mg  1,000 mg Oral Daily    guaiFENesin (MUCINEX) 12 hr tablet 600 mg  600 mg Oral BID    LORazepam (ATIVAN) tablet 0.5 mg  0.5 mg Oral HS PRN    losartan (COZAAR) tablet 50 mg  50 mg Oral Daily    montelukast (SINGULAIR) tablet 10 mg  10 mg Oral Daily    nadolol (CORGARD) tablet 80 mg  80 mg Oral Early Morning    pantoprazole (PROTONIX) EC tablet 40 mg  40 mg Oral Early Morning    saccharomyces boulardii (FLORASTOR) capsule 250 mg  250 mg Oral BID    trimethobenzamide (TIGAN) IM injection 200 mg  200 mg Intramuscular Q6H PRN    zolpidem (AMBIEN) tablet 5 mg  5 mg Oral HS     Home Medications:   Medications Prior to Admission:     Acetaminophen 500 MG    ascorbic acid (VITAMIN C) 250 MG CHEW    atorvastatin (LIPITOR) 20 mg tablet    benzonatate (TESSALON) 200 MG capsule    Blood Pressure Monitoring (Omron 5 Series BP Monitor) PEE    dabigatran etexilate (PRADAXA) 150 mg capsu    docusate sodium (COLACE) 100 mg capsule    Lactobacillus (PROBIOTIC ACIDOPHILUS PO)    LORazepam (Ativan) 0.5 mg tablet    losartan (COZAAR) 50 mg tablet    montelukast (SINGULAIR) 10 mg tablet    nadolol (CORGARD) 80 MG tablet    Omega-3 Fatty Acids (FISH OIL) 1,000 mg     "omeprazole (PriLOSEC) 40 MG capsule    spironolactone (ALDACTONE) 25 mg tablet    triamcinolone (KENALOG) 0.1 % ointment    vitamin B-12 (CYANOCOBALAMIN) 500 MCG TABS    zolpidem (AMBIEN) 5 mg tablet    Allergies   Allergen Reactions    Ciprofloxacin Other (See Comments)     LONG QT syndrome    Codeine Other (See Comments)     agitated    Sulfamethoxazole-Trimethoprim GI Intolerance, Abdominal Pain and Other (See Comments)     upset stomach    Augmentin [Amoxicillin-Pot Clavulanate] Rash     Livedo reticularis    Macrobid [Nitrofurantoin] Dizziness, Headache and Fatigue     Per Patient       Objective   Vitals: Blood pressure 97/50, pulse 70, temperature 97.9 °F (36.6 °C), temperature source Oral, resp. rate 17, height 5' 8\" (1.727 m), weight 86.6 kg (190 lb 14.7 oz), SpO2 96%.  Orthostatic Blood Pressures      Flowsheet Row Most Recent Value   Blood Pressure 97/50 filed at 09/04/2024 0750   Patient Position - Orthostatic VS Lying filed at 09/04/2024 0750              Invasive Devices       Peripheral Intravenous Line  Duration             Peripheral IV 09/02/24 Left Forearm 2 days                    Physical Exam    GEN: Murphy Willams appears well, alert and oriented x 3, pleasant and cooperative   HEENT:  Normocephalic, atraumatic, anicteric, moist mucous membranes  NECK: No JVD or carotid bruits   HEART: Regular rhythm, normal rate, normal S1 and S2, no murmurs, clicks, gallops or rubs   LUNGS: Clear to auscultation bilaterally; no wheezes, rales, or rhonchi; respiration nonlabored   ABDOMEN:  Normoactive bowel sounds, soft, no tenderness, no distention  EXTREMITIES: peripheral pulses palpable; no edema  NEURO: no gross focal findings; cranial nerves grossly intact   SKIN:  Dry, intact, warm to touch    Lab Results: I have personally reviewed pertinent lab results.    CBC with diff:   Results from last 7 days   Lab Units 09/04/24  0438   WBC Thousand/uL 128.05*   RBC Million/uL 1.57*   HEMOGLOBIN g/dL 6.4* "   HEMATOCRIT % 18.9*   MCV fL 120*   MCH pg 40.8*   MCHC g/dL 33.9   MPV fL 9.6   PLATELETS Thousands/uL 312     CMP:   Results from last 7 days   Lab Units 09/04/24 0438 09/03/24 1007 09/03/24  0425   SODIUM mmol/L 132*   < > 125*   CHLORIDE mmol/L 100  --  96   CO2 mmol/L 24  --  21   BUN mg/dL 20  --  17   CREATININE mg/dL 0.87  --  0.84   CALCIUM mg/dL 8.5  --  8.4   AST U/L  --   --  29   ALT U/L  --   --  20   ALK PHOS U/L  --   --  80   EGFR ml/min/1.73sq m 86  --  88    < > = values in this interval not displayed.     HS Troponin:   0   Lab Value Date/Time    HSTNI0 8 09/02/2024 0733    HSTNI2 9 09/02/2024 0931    HSTNI4 8 09/02/2024 1308    HSTNI4 169 (H) 03/06/2023 1802    HSTNI4 23 09/27/2022 2141     BNP:   Results from last 7 days   Lab Units 09/04/24  0438   POTASSIUM mmol/L 4.9   CHLORIDE mmol/L 100   CO2 mmol/L 24   BUN mg/dL 20   CREATININE mg/dL 0.87   CALCIUM mg/dL 8.5   EGFR ml/min/1.73sq m 86     Coags:     TSH:     Magnesium:     Lipid Profile:         Results from last 7 days   Lab Units 09/04/24  0438 09/03/24  0425 09/02/24  0733   POTASSIUM mmol/L 4.9 4.6 4.9   CO2 mmol/L 24 21 25   CHLORIDE mmol/L 100 96 98   BUN mg/dL 20 17 14   CREATININE mg/dL 0.87 0.84 0.83     Results from last 7 days   Lab Units 09/04/24 0438 09/03/24 1007 09/02/24  0733   HEMOGLOBIN g/dL 6.4* 5.7* 8.7*   HEMATOCRIT % 18.9* 16.4* 24.1*   PLATELETS Thousands/uL 312 304 347                 Imaging: I have personally reviewed pertinent reports.   and I have personally reviewed pertinent films in PACS    Telemetry:   off    EKG:   Date: 9/3/2024  Interpretation: AV paced rhythm, QTc 563      Previous STRESS TEST:  No results found for this or any previous visit.     No results found for this or any previous visit.    No results found for this or any previous visit.      Previous Cath/PCI:  Left heart cath 3/7/2023  Impression         Prox LAD to Mid LAD lesion is 40% stenosed.     No flow-significant atherosclerotic  lesions were identified.  Temporary pacing wire placed for recurrent torsade, including an episode immediately prior to the catheterization procedure.  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  Plan: ICD    ECHO:  Complete transthoracic echo 9/3/2024    Interpretation Summary  Show Result ComparisonTechnically fair quality transthoracic echocardiogram study.     Mild to moderate concentric left ventricular potential, normal left ventricular systolic function with regional wall motion variability.  Ejection fraction is estimated as around 55%.  Mild dilated right ventricle cavity, normal right ventricular systolic function.  Severe left atrial and moderate right atrial cavity enlargement.  Aortic valve leaflet thickening and sclerosis, mild aortic valve regurgitation.  Mitral valve leaflet sclerosis, mild mitral valve regurgitation.  Severe tricuspid valve regurgitation and mild pulmonic valve regurgitation.  Severe pulmonary hypertension.  Estimated RVSP/PASP is greater than 74 mmHg.  No pericardial effusion.     Compared to report of previous echocardiogram from 8/21/2023 severe tricuspid valve regurgitation and pulmonary hypertension are new.  Left ventricular ejection fraction appears to be slightly better.     Findings    Left Ventricle Normal left ventricular size, mild to moderate concentric ventricular hypertrophy, normal left ventricular systolic function.  There is akinesis of the basal inferior wall.  There is hypokinesis of the mid and distal inferolateral wall.  It abnormal septal motion consistent with paced rhythm.  Left ventricular ejection fraction is estimated as around 55%.  Grade 3 diastolic dysfunction, restricted left ventricular filling pattern.  Estimated left ventricular filling pressure is increased.   Right Ventricle Mild atrophy to cavity, normal right ventricular systolic function.  Estimated right ventricular systolic function is severely decreased, 84 mmHg.  Severe  pulmonary hypertension.   Left Atrium Severe left atrial cavity enlargement.   Right Atrium Moderate right atrial cavity enlargement.   Aortic Valve Normal, trileaflet aortic valve, normal leaflet thickness mild leaflet sclerosis with fibrocalcific changes, adequate cuspal suppression.  No aortic valve stenosis.  Mild aortic valve regurgitation.   Mitral Valve Normal mitral valve structure, mild mitral valve leaflet thickening and sclerosis, adequate leaflet mobility.  Mild mitral valve regurgitation.   Tricuspid Valve Normal tricuspid valve structure and leaflet mobility.  Severe tricuspid valve regurgitation with multiple regurgitation jets.   Pulmonic Valve Grossly normal pulmonic valve structure.  Normal leaflet mobility.  Mild pulmonic valve regurgitation.   Ascending Aorta Dilated aortic root.   IVC/SVC The inferior vena cava is not well visualized.   Pericardium Pericardium is normal in appearance. There is no pericardial effusion.     Left Ventricle Measurements    Function/Volumes   A4C EF 60 %         LVOT stroke volume 73.74         LVOT stroke volume index 38 ml/m2         Left ventricular stroke volume (2D) 39 mL         LVOT Cardiac Output 5.26 l/min         LVOT Cardiac Index 2.63 l/min/m2         Dimensions   LVIDd 4.7 cm         LVIDS 3.9 cm         IVSd 1.4 cm         LVPWd 1.4 cm         LVOT area 3.46 cm2         FS 17         Diastolic Filling   LA Volume Index (BP) 49.5 mL/m2          Report Measurements   AV LVOT peak gradient 5 mmHg              Interventricular Septum Measurements    Shunt Ratio   LVOT peak VTI 21.3 cm         LVOT peak so 1.09 m/s              Right Ventricle Measurements    Dimensions   RVID d 4.4 cm         Tricuspid annular plane systolic excursion 2.2 cm               Left Atrium Measurements    Dimensions   LA size 4.4 cm         LA length (A2C) 6.6 cm         Volumes   LA volume (BP) 99 mL         LA Volume Index (BP) 49.5 mL/m2               Right Atrium  Measurements    Dimensions   RAA A4C 21 cm2               Atrial Septum Measurements    Shunt Ratio   LVOT peak VTI 21.3 cm         LVOT peak adithya 1.09 m/s               Aortic Valve Measurements    Stenosis   Aortic valve peak velocity 1.59 m/s         LVOT peak adithya 1.09 m/s         Ao VTI 33.18 cm         LVOT peak VTI 21.3 cm         AV mean gradient 5 mmHg         LVOT mn grad 2 mmHg         AV peak gradient 10 mmHg         AV LVOT peak gradient 5 mmHg         Regurgitation   AV peak gradient 48 mmHg         AV Deceleration Time 1,436 ms         AV regurgitation pressure 1/2 time 416 ms         Area/Dimensions   DVI 0.69         AV valve area 2.22 cm2         AV area by cont VTI 2.2 cm2         AV area peak adithya 2.4 cm2         LVOT diameter 2.1 cm         LVOT area 3.46 cm2               Tricuspid Valve Measurements    RVSP Parameters   TR Peak Adithya 4.4 m/s         Triscuspid Valve Regurgitation Peak Gradient 77 mmHg               Aorta Measurements    Aortic Dimensions   Ao root 3.7 cm              Limited follow-up transthoracic echo 8/21/2023    Interpretation Summary  Show Result Comparison     Left Ventricle: Left ventricular cavity size is normal. Wall thickness is normal. The left ventricular ejection fraction is 45%. Systolic function is mildly reduced. There is mild global hypokinesis with akinesis of the basal inferior myocardial wall segment.    Left Atrium: The atrium is mildly dilated.    Right Atrium: The atrium is mildly dilated.    Aortic Valve: There is mild regurgitation.    Mitral Valve: There is mild annular calcification.    Tricuspid Valve: There is mild regurgitation.     Findings    Left Ventricle Left ventricular cavity size is normal. Wall thickness is normal. The left ventricular ejection fraction is 45%. Systolic function is mildly reduced.  There is mild global hypokinesis with akinesis of the basal inferior myocardial wall segment. Unable to assess diastolic function.   Right  Ventricle Right ventricular cavity size is normal. Systolic function is normal. Wall thickness is normal.  A pacer wire was noted.   Left Atrium The atrium is mildly dilated.   Right Atrium The atrium is mildly dilated.   Aortic Valve The aortic valve is trileaflet. The leaflets are mildly thickened. The leaflets are mildly calcified. There is mild regurgitation. The aortic valve has no significant stenosis.   Mitral Valve The leaflets exhibit normal mobility. There is mild annular calcification. There is no evidence of regurgitation. There is no evidence of stenosis. The valve has normal function.   Tricuspid Valve Tricuspid valve structure is normal. There is mild regurgitation. There is no evidence of stenosis.   Pulmonic Valve Pulmonic valve structure is normal. There is no evidence of regurgitation. There is no evidence of stenosis.   Ascending Aorta The aortic root is normal in size.   IVC/SVC The inferior vena cava is normal in size.   Pericardium There is no pericardial effusion. The pericardium is normal in appearance.     Left Ventricle Measurements    Function/Volumes   A4C EF 46 %         LV Diastolic Volume (BP) 236 mL         LV Systolic Volume (BP) 130 mL         EF 45 %          Report Measurements   AV LVOT peak gradient 3 mmHg              Interventricular Septum Measurements    Shunt Ratio   LVOT peak VTI 19.06 cm         LVOT peak so 0.92 m/s              Atrial Septum Measurements    Shunt Ratio   LVOT peak VTI 19.06 cm         LVOT peak so 0.92 m/s               Aortic Valve Measurements    Stenosis   Aortic valve peak velocity 1.4 m/s         LVOT peak so 0.92 m/s         Ao VTI 28.62 cm         LVOT peak VTI 19.06 cm         AV mean gradient 4 mmHg         LVOT mn grad 2 mmHg         AV peak gradient 8 mmHg         AV LVOT peak gradient 3 mmHg         Regurgitation   AV peak gradient 59 mmHg         AV Deceleration Time 1,832 ms         AV regurgitation pressure 1/2 time 531 ms          Area/Dimensions   DVI 0.66               Tricuspid Valve Measurements    RVSP Parameters   TR Peak Adithya 3 m/s         Triscuspid Valve Regurgitation Peak Gradient   37 mmHg                  HOLTER  No results found for this or any previous visit.        Assessment & Plan     Assessment:    Abnormal CT chest revealing possible left atrial appendage thrombus  Community-acquired pneumonia with residual cough  Severe pulm hypertension, multifactorial  Long QT syndrome 2, on nadolol, dual-chamber/biventricular ICD placed 3/8/2023  Paroxysmal atrial fibrillation/flutter  Nonischemic cardiomyopathy, resolved  CLL with severe anemia        Plan:  Cardiology consulted for possible left atrial appendage thrombus on CT chest.  Spoke with radiology, Dr. Patel--this could be mixing artifact although thrombus cannot be completely ruled out.  Spoke with Dr. Walker Hensley was recommended CT pulmonary vein mapping, and radiology technologist will plan on appropriately gated CUONG scan.  Patient high risk for SOILA given significant anemia, recent pneumonia with residual cough.  Pradaxa on hold secondary to severe anemia.  Stool occult blood and hemolytic anemia workup pending.  Will defer recommendations to hematology.            Thank you for involving us in the care of your patient.  ==========================================================================================      ** Please Note: Fluency DirectDictation voice to text software may have been used in the creation of this document. **

## 2024-09-04 NOTE — PLAN OF CARE
Problem: PAIN - ADULT  Goal: Verbalizes/displays adequate comfort level or baseline comfort level  Description: Interventions:  - Encourage patient to monitor pain and request assistance  - Assess pain using appropriate pain scale  - Administer analgesics based on type and severity of pain and evaluate response  - Implement non-pharmacological measures as appropriate and evaluate response  - Consider cultural and social influences on pain and pain management  - Notify physician/advanced practitioner if interventions unsuccessful or patient reports new pain  Outcome: Progressing     Problem: INFECTION - ADULT  Goal: Absence or prevention of progression during hospitalization  Description: INTERVENTIONS:  - Assess and monitor for signs and symptoms of infection  - Monitor lab/diagnostic results  - Monitor all insertion sites, i.e. indwelling lines, tubes, and drains  - Monitor endotracheal if appropriate and nasal secretions for changes in amount and color  - [place] appropriate cooling/warming therapies per order  - Administer medications as ordered  - Instruct and encourage patient and family to use good hand hygiene technique  - Identify and instruct in appropriate isolation precautions for identified infection/condition  Outcome: Progressing  Goal: Absence of fever/infection during neutropenic period  Description: INTERVENTIONS:  - Monitor WBC    Outcome: Progressing     Problem: SAFETY ADULT  Goal: Patient will remain free of falls  Description: INTERVENTIONS:  - Educate patient/family on patient safety including physical limitations  - Instruct patient to call for assistance with activity   - Consult OT/PT to assist with strengthening/mobility   - Keep Call bell within reach  - Keep bed low and locked with side rails adjusted as appropriate  - Keep care items and personal belongings within reach  - Initiate and maintain comfort rounds  - Make Fall Risk Sign visible to staff  - Apply yellow socks and bracelet  for high fall risk patients  - Consider moving patient to room near nurses station  Outcome: Progressing  Goal: Maintain or return to baseline ADL function  Description: INTERVENTIONS:  -  Assess patient's ability to carry out ADLs; assess patient's baseline for ADL function and identify physical deficits which impact ability to perform ADLs (bathing, care of mouth/teeth, toileting, grooming, dressing, etc.)  - Assess/evaluate cause of self-care deficits   - Assess range of motion  - Assess patient's mobility; develop plan if impaired  - Assess patient's need for assistive devices and provide as appropriate  - Encourage maximum independence but intervene and supervise when necessary  - Involve family in performance of ADLs  - Assess for home care needs following discharge   - Consider OT consult to assist with ADL evaluation and planning for discharge  - Provide patient education as appropriate  Outcome: Progressing  Goal: Maintains/Returns to pre admission functional level  Description: INTERVENTIONS:  - Perform AM-PAC 6 Click Basic Mobility/ Daily Activity assessment daily.  - Set and communicate daily mobility goal to care team and patient/family/caregiver.   - Collaborate with rehabilitation services on mobility goals if consulted  - Out of bed for toileting  - Record patient progress and toleration of activity level   Outcome: Progressing     Problem: DISCHARGE PLANNING  Goal: Discharge to home or other facility with appropriate resources  Description: INTERVENTIONS:  - Identify barriers to discharge w/patient and caregiver  - Arrange for needed discharge resources and transportation as appropriate  - Identify discharge learning needs (meds, wound care, etc.)  - Arrange for interpretive services to assist at discharge as needed  - Refer to Case Management Department for coordinating discharge planning if the patient needs post-hospital services based on physician/advanced practitioner order or complex needs  related to functional status, cognitive ability, or social support system  Outcome: Progressing     Problem: Knowledge Deficit  Goal: Patient/family/caregiver demonstrates understanding of disease process, treatment plan, medications, and discharge instructions  Description: Complete learning assessment and assess knowledge base.  Interventions:  - Provide teaching at level of understanding  - Provide teaching via preferred learning methods  Outcome: Progressing

## 2024-09-04 NOTE — ASSESSMENT & PLAN NOTE
73-year-old male with PMH of CLL, A-fib, and HTN presented with cough  Recently diagnosed with pneumonia with his PCP and completed course of antibiotics with Augmentin.  Follow-up with pulmonology, given Tessalon Perles with no significant improvement  Presented to the ED with worsening cough, with CT imaging revealing right middle lobe pneumonia  Concern for possible failure with recent antibiotics Augmentin, though has been afebrile.  Does have significantly elevated white count with known history of CLL  Procalcitonin negative x 2, antibiotics discontinued  Plan for outpatient repeat CT scan in 4 to 6 weeks, may require bronchoscopy if no improvement

## 2024-09-04 NOTE — QUICK NOTE
I was able to call the patient on his cell phone to discuss his concerns regarding his anemia and rising white cell count.  The hemolysis workup does not suggest active hemolysis at this point even though the Narciso test is positive.  He should be receiving packed red blood cell transfusion x 2 units anytime soon which was most likely going to improve his fatigue and other symptoms.  The rising lymphocyte count could be reactive.  However, progression of his CLL is also likely.  The patient needs to be seen by his primary oncologist, Dr. Hanley in the office once he gets discharged to discuss what the next Is going to be including close monitoring.  I think a trial of tapering dose of prednisone, if there is no contraindication, is not a bad idea.  I would start him on prednisone 60 mg daily for a week, 40 mg for a week, 30 mg for a week, 20 mg for a week then stay on 10 mg.  He should be also on PPIs while on steroids.

## 2024-09-04 NOTE — ASSESSMENT & PLAN NOTE
Continue nadolol and pradaxa  CTA showing lack of opacification of the left atrial appendage, cannot r/o thrombus-repeat echocardiogram completed  Plan for CT cardiac with pulm vein mapping  Pradaxa on hold given significant anemia

## 2024-09-04 NOTE — ASSESSMENT & PLAN NOTE
Patient developed worsening anemia with hemoglobin 5.7 from baseline around 12  Transfused 2 units PRBCs, awaiting transfusion due to multiple antibodies  Hemolysis workup ongoing-haptoglobin 83,  negative hemolysis smear  Does have elevated T. bili 2.29  Continue trending labs  Hematology following  Additionally, patient does have livedo reticularis appearing rash-autoimmune?  Given concern for autoimmune hemolytic anemia, started on steroids by hematology

## 2024-09-04 NOTE — PLAN OF CARE
Problem: PAIN - ADULT  Goal: Verbalizes/displays adequate comfort level or baseline comfort level  Description: Interventions:  - Encourage patient to monitor pain and request assistance  - Assess pain using appropriate pain scale  - Administer analgesics based on type and severity of pain and evaluate response  - Implement non-pharmacological measures as appropriate and evaluate response  - Consider cultural and social influences on pain and pain management  - Notify physician/advanced practitioner if interventions unsuccessful or patient reports new pain  Outcome: Progressing     Problem: INFECTION - ADULT  Goal: Absence or prevention of progression during hospitalization  Description: INTERVENTIONS:  - Assess and monitor for signs and symptoms of infection  - Monitor lab/diagnostic results  - Monitor all insertion sites, i.e. indwelling lines, tubes, and drains  - Monitor endotracheal if appropriate and nasal secretions for changes in amount and color  - [place] appropriate cooling/warming therapies per order  - Administer medications as ordered  - Instruct and encourage patient and family to use good hand hygiene technique  - Identify and instruct in appropriate isolation precautions for identified infection/condition  Outcome: Progressing  Goal: Absence of fever/infection during neutropenic period  Description: INTERVENTIONS:  - Monitor WBC    Outcome: Progressing     Problem: SAFETY ADULT  Goal: Patient will remain free of falls  Description: INTERVENTIONS:  - Educate patient/family on patient safety including physical limitations  - Instruct patient to call for assistance with activity   - Consult OT/PT to assist with strengthening/mobility   - Keep Call bell within reach  - Keep bed low and locked with side rails adjusted as appropriate  - Keep care items and personal belongings within reach  - Initiate and maintain comfort rounds  - Make Fall Risk Sign visible to staff  - Offer Toileting every 2 Hours,  in advance of need  - Initiate/Maintain bed alarm  - Obtain necessary fall risk management equipment: alarms  - Apply yellow socks and bracelet for high fall risk patients  - Consider moving patient to room near nurses station  Outcome: Progressing  Goal: Maintain or return to baseline ADL function  Description: INTERVENTIONS:  -  Assess patient's ability to carry out ADLs; assess patient's baseline for ADL function and identify physical deficits which impact ability to perform ADLs (bathing, care of mouth/teeth, toileting, grooming, dressing, etc.)  - Assess/evaluate cause of self-care deficits   - Assess range of motion  - Assess patient's mobility; develop plan if impaired  - Assess patient's need for assistive devices and provide as appropriate  - Encourage maximum independence but intervene and supervise when necessary  - Involve family in performance of ADLs  - Assess for home care needs following discharge   - Consider OT consult to assist with ADL evaluation and planning for discharge  - Provide patient education as appropriate  Outcome: Progressing  Goal: Maintains/Returns to pre admission functional level  Description: INTERVENTIONS:  - Perform AM-PAC 6 Click Basic Mobility/ Daily Activity assessment daily.  - Set and communicate daily mobility goal to care team and patient/family/caregiver.   - Collaborate with rehabilitation services on mobility goals if consulted  - Perform Range of Motion 4 times a day.  - Reposition patient every 2 hours.  - Dangle patient 4 times a day  - Stand patient 4 times a day  - Ambulate patient 4 times a day  - Out of bed to chair 4 times a day   - Out of bed for meals 3 times a day  - Out of bed for toileting  - Record patient progress and toleration of activity level   Outcome: Progressing     Problem: DISCHARGE PLANNING  Goal: Discharge to home or other facility with appropriate resources  Description: INTERVENTIONS:  - Identify barriers to discharge w/patient and  caregiver  - Arrange for needed discharge resources and transportation as appropriate  - Identify discharge learning needs (meds, wound care, etc.)  - Arrange for interpretive services to assist at discharge as needed  - Refer to Case Management Department for coordinating discharge planning if the patient needs post-hospital services based on physician/advanced practitioner order or complex needs related to functional status, cognitive ability, or social support system  Outcome: Progressing     Problem: Knowledge Deficit  Goal: Patient/family/caregiver demonstrates understanding of disease process, treatment plan, medications, and discharge instructions  Description: Complete learning assessment and assess knowledge base.  Interventions:  - Provide teaching at level of understanding  - Provide teaching via preferred learning methods  Outcome: Progressing

## 2024-09-04 NOTE — ASSESSMENT & PLAN NOTE
Developed a rash shortly after completing Augmentin therapy  Question autoimmune component given history of CLL with autoimmune hemolytic anemia  Continue to monitor with supportive care time  Monitor with improvement in hemoglobin and steroids

## 2024-09-05 LAB
ABO GROUP BLD BPU: NORMAL
ABO GROUP BLD BPU: NORMAL
ALBUMIN SERPL BCG-MCNC: 3.9 G/DL (ref 3.5–5)
ALP SERPL-CCNC: 92 U/L (ref 34–104)
ALT SERPL W P-5'-P-CCNC: 27 U/L (ref 7–52)
ANION GAP SERPL CALCULATED.3IONS-SCNC: 7 MMOL/L (ref 4–13)
AST SERPL W P-5'-P-CCNC: 25 U/L (ref 13–39)
ATRIAL RATE: 73 BPM
BILIRUB SERPL-MCNC: 1.57 MG/DL (ref 0.2–1)
BPU ID: NORMAL
BPU ID: NORMAL
BUN SERPL-MCNC: 17 MG/DL (ref 5–25)
CALCIUM SERPL-MCNC: 8.4 MG/DL (ref 8.4–10.2)
CHLORIDE SERPL-SCNC: 98 MMOL/L (ref 96–108)
CO2 SERPL-SCNC: 23 MMOL/L (ref 21–32)
CREAT SERPL-MCNC: 0.77 MG/DL (ref 0.6–1.3)
CROSSMATCH: NORMAL
CROSSMATCH: NORMAL
GFR SERPL CREATININE-BSD FRML MDRD: 91 ML/MIN/1.73SQ M
GLUCOSE SERPL-MCNC: 159 MG/DL (ref 65–140)
HCT VFR BLD AUTO: 19.1 % (ref 36.5–49.3)
HGB BLD-MCNC: 6.3 G/DL (ref 12–17)
MCH RBC QN AUTO: 35.8 PG (ref 26.8–34.3)
MCHC RBC AUTO-ENTMCNC: 33 G/DL (ref 31.4–37.4)
MCV RBC AUTO: 109 FL (ref 82–98)
NRBC BLD AUTO-RTO: 0 /100 WBCS
PLATELET # BLD AUTO: 265 THOUSANDS/UL (ref 149–390)
PMV BLD AUTO: 9.6 FL (ref 8.9–12.7)
POTASSIUM SERPL-SCNC: 5.2 MMOL/L (ref 3.5–5.3)
PR INTERVAL: 124 MS
PROT SERPL-MCNC: 7 G/DL (ref 6.4–8.4)
QRS AXIS: -31 DEGREES
QRSD INTERVAL: 158 MS
QT INTERVAL: 542 MS
QTC INTERVAL: 588 MS
RBC # BLD AUTO: 1.76 MILLION/UL (ref 3.88–5.62)
SODIUM 24H UR-SCNC: 18 MOL/L
SODIUM SERPL-SCNC: 128 MMOL/L (ref 135–147)
T WAVE AXIS: 179 DEGREES
UNIT DISPENSE STATUS: NORMAL
UNIT DISPENSE STATUS: NORMAL
UNIT PRODUCT CODE: NORMAL
UNIT PRODUCT CODE: NORMAL
UNIT PRODUCT VOLUME: 350 ML
UNIT PRODUCT VOLUME: 350 ML
UNIT RH: NORMAL
UNIT RH: NORMAL
VENTRICULAR RATE: 71 BPM
WBC # BLD AUTO: 125.54 THOUSAND/UL (ref 4.31–10.16)

## 2024-09-05 PROCEDURE — 80053 COMPREHEN METABOLIC PANEL: CPT | Performed by: PHYSICIAN ASSISTANT

## 2024-09-05 PROCEDURE — 99232 SBSQ HOSP IP/OBS MODERATE 35: CPT | Performed by: INTERNAL MEDICINE

## 2024-09-05 PROCEDURE — P9040 RBC LEUKOREDUCED IRRADIATED: HCPCS

## 2024-09-05 PROCEDURE — 99223 1ST HOSP IP/OBS HIGH 75: CPT | Performed by: INTERNAL MEDICINE

## 2024-09-05 PROCEDURE — 83935 ASSAY OF URINE OSMOLALITY: CPT | Performed by: INTERNAL MEDICINE

## 2024-09-05 PROCEDURE — 84300 ASSAY OF URINE SODIUM: CPT | Performed by: INTERNAL MEDICINE

## 2024-09-05 PROCEDURE — 93010 ELECTROCARDIOGRAM REPORT: CPT

## 2024-09-05 PROCEDURE — 93005 ELECTROCARDIOGRAM TRACING: CPT

## 2024-09-05 PROCEDURE — 85027 COMPLETE CBC AUTOMATED: CPT | Performed by: PHYSICIAN ASSISTANT

## 2024-09-05 RX ORDER — NADOLOL 20 MG/1
80 TABLET ORAL
Status: DISCONTINUED | OUTPATIENT
Start: 2024-09-05 | End: 2024-09-12 | Stop reason: HOSPADM

## 2024-09-05 RX ORDER — POLYETHYLENE GLYCOL 3350 17 G/17G
17 POWDER, FOR SOLUTION ORAL ONCE
Status: COMPLETED | OUTPATIENT
Start: 2024-09-05 | End: 2024-09-05

## 2024-09-05 RX ORDER — LIDOCAINE 50 MG/G
1 PATCH TOPICAL DAILY
Status: DISCONTINUED | OUTPATIENT
Start: 2024-09-05 | End: 2024-09-12

## 2024-09-05 RX ADMIN — METHYLPREDNISOLONE SODIUM SUCCINATE 40 MG: 40 INJECTION, POWDER, FOR SOLUTION INTRAMUSCULAR; INTRAVENOUS at 21:18

## 2024-09-05 RX ADMIN — BENZONATATE 100 MG: 100 CAPSULE ORAL at 09:41

## 2024-09-05 RX ADMIN — METHYLPREDNISOLONE SODIUM SUCCINATE 40 MG: 40 INJECTION, POWDER, FOR SOLUTION INTRAMUSCULAR; INTRAVENOUS at 09:12

## 2024-09-05 RX ADMIN — LOSARTAN POTASSIUM 50 MG: 50 TABLET, FILM COATED ORAL at 09:10

## 2024-09-05 RX ADMIN — LORAZEPAM 0.5 MG: 0.5 TABLET ORAL at 21:19

## 2024-09-05 RX ADMIN — ZOLPIDEM TARTRATE 5 MG: 5 TABLET, COATED ORAL at 22:06

## 2024-09-05 RX ADMIN — GUAIFENESIN 600 MG: 600 TABLET, EXTENDED RELEASE ORAL at 21:18

## 2024-09-05 RX ADMIN — LIDOCAINE 1 PATCH: 700 PATCH TOPICAL at 22:58

## 2024-09-05 RX ADMIN — OMEGA-3 FATTY ACIDS CAP 1000 MG 1000 MG: 1000 CAP at 09:08

## 2024-09-05 RX ADMIN — POLYETHYLENE GLYCOL 3350 17 G: 17 POWDER, FOR SOLUTION ORAL at 18:46

## 2024-09-05 RX ADMIN — Medication 250 MG: at 09:11

## 2024-09-05 RX ADMIN — Medication 250 MG: at 09:08

## 2024-09-05 RX ADMIN — ATORVASTATIN CALCIUM 20 MG: 20 TABLET, FILM COATED ORAL at 17:54

## 2024-09-05 RX ADMIN — CYANOCOBALAMIN TAB 500 MCG 500 MCG: 500 TAB at 09:10

## 2024-09-05 RX ADMIN — MONTELUKAST 10 MG: 10 TABLET, FILM COATED ORAL at 09:10

## 2024-09-05 RX ADMIN — NADOLOL 80 MG: 20 TABLET ORAL at 11:14

## 2024-09-05 RX ADMIN — ACETAMINOPHEN 650 MG: 325 TABLET ORAL at 22:58

## 2024-09-05 RX ADMIN — Medication 250 MG: at 17:56

## 2024-09-05 RX ADMIN — GUAIFENESIN 600 MG: 600 TABLET, EXTENDED RELEASE ORAL at 09:10

## 2024-09-05 RX ADMIN — PANTOPRAZOLE SODIUM 40 MG: 40 TABLET, DELAYED RELEASE ORAL at 06:10

## 2024-09-05 NOTE — ASSESSMENT & PLAN NOTE
Currently follow with Dr. Hanley, not on any current medications  WBC has been significantly elevated as high as 138,000 this hospitalization  Could be reactive in the setting of recent pneumonia, however cannot rule out progression of CLL    WBC is down to 120,000 today.  Follow-up flow cytometry  IR consulted for bone marrow biopsy  Monitor daily CBC  Follow-up any additional oncology recommendations  Outpatient follow-up with Dr. Hanley

## 2024-09-05 NOTE — CONSULTS
Progress Note -  [facility]  Murphy Willams 73 y.o. male MRN: [id number]  Unit/Bed#: [place] [address] -01 Encounter: [id number]      ASSESSMENT AND PLAN:       73-year-old male with past medical history of A-fib on Pradaxa, CLL, hypertension, pulmonary hypertension, who presented for cough and shortness of breath treated for pneumonia consulted for acute anemia.    Anemia  Presented with hemoglobin of 8.7 baseline around 12  Hemoglobin downtrending to 5.7 received 2 PRBCs with improvement to 6.4  Labs revealed initial T. bili 1.5 increased to 2.29(direct at the time of 0.43) ; LDH of 234, haptoglobin 83, smear negative, Narciso test positive; ferritin of 302, B12 70405, folate 16 iron sat 15  Patient has had a previous episode of acute drop in hemoglobin in 2021 evaluated with EGD which showed mild GAVE status post ablation at that time thought to be secondary to autoimmune hemolytic anemia started on steroids with improvement  Colonoscopy October 2023 showed 6 subcentimeter polyps, scattered diverticulosis in transverse and sigmoid  In discussion with hematology started on steroids for autoimmune hemolytic anemia  Acute anemia does not appear to be secondary to acute GI bleed and likely secondary to hemolytic anemia  Continue plan per hematology with steroid course  Continue home PPI especially while on steroids  Continue to trend CBC and monitor for signs of GI bleeding      Rest of care per primary team.    ______________________________________________________________________    Subjective:    The patient is 73-year-old male with past medical history of A-fib on Pradaxa, CLL, hypertension, pulmonary hypertension, who presented for cough and shortness of breath that treated Augmentin outpatient.  CT showed right middle lobe consolidation started on antibiotics which is since improved.  Consulted for worsening anemia while inpatient.     Patient states that he complained of fatigue on presentation  that worsen when his hemoglobin dropped to 5.7.  He was transfused 2 PRBCs with improvement of hemoglobin to 6.4.  Patient reports 7 pound weight yesterday that was well-formed without any melena or hematochezia.  He denies any previous history of GI bleed.  His last colonoscopy was October 2023 which showed 6 subcentimeter polyps and scattered diverticulosis in the transverse and sigmoid.  EGD in 2021 performed for similar acute anemia episode showed mild GAVE that was ablated although not thought to be because of acute anemia at the time.  He follows with hematology outpatient who prescribed a course of steroids for concern for autoimmune hemolytic anemia.    At time of examination patient reports feeling some shortness of breath and fatigue that has improved since the transfusion.  He denies any fevers, chills, nausea, vomiting, diarrhea, constipation.        REVIEW OF SYSTEMS:    Review of Systems   Constitutional:  Negative for chills, fatigue and fever.   Respiratory:  Positive for shortness of breath. Negative for cough and stridor.    Cardiovascular:  Negative for chest pain and leg swelling.   Gastrointestinal:  Negative for abdominal distention, abdominal pain, constipation, diarrhea, nausea and vomiting.   Genitourinary:  Negative for difficulty urinating.   Neurological:  Negative for dizziness.          Historical Information   Past Medical History:   Diagnosis Date    Anxiety     BPH (benign prostatic hypertrophy)     Cancer (HCC)     CLL    CLL (chronic lymphocytic leukemia) (HCC)     2009    Colon polyp     Concussion     Resolved: 08/22/16    COVID-19 12/29/2023    Depression     Diverticulitis 01/13/2020 2014 CT done 11/19 12/19 CT done     E coli bacteremia 06/27/2021    2/2 blood cultures with Ecoli  Source appears to be the urine     Epididymitis 05/19/2021 5/2021    Gastrointestinal hemorrhage     Last assessed: 08/27/13    GERD (gastroesophageal reflux disease)     H/O vitamin D  deficiency 07/07/2021    Hearing loss of aging     Hiatal hernia     History of right hip replacement 04/19/2021    History of transfusion     Hyperlipidemia     Hypertension     Hyponatremia 03/06/2023    Iron deficiency anemia     Microscopic hematuria     Last assessed: 06/28/13    OA (osteoarthritis)     right hip    Obesity (BMI 30.0-34.9) 04/07/2022    Pneumothorax     Primary osteoarthritis of right hip 09/29/2017    He is status post right hip arthroplasty    Prostatitis     Pulmonary hypertension ([facility])     QT prolongation     Seasonal allergies     Sepsis ([facility]) 03/06/2023    Unresponsive episode 03/07/2023    Urinary tract infection associated with catheterization of urinary tract  ([facility]) 02/05/2021    Pseudomonal UTI asymptomatic.  Per Urology do not treat at this time.    Urinary tract infection associated with catheterization of urinary tract  ([facility]) 02/05/2021    Pseudomonal UTI asymptomatic.  Per Urology do not treat at this time.  He did have urosepsis from E coli 7/21    UTI (urinary tract infection)     in past    Ventricular fibrillation ([facility]) 03/06/2023    Noted after unresponsive episode      Wears glasses      Past Surgical History:   Procedure Laterality Date    ADENOIDECTOMY      BLADDER SURGERY      CARDIAC CATHETERIZATION Left 03/07/2023    Procedure: Cardiac Left Heart Cath;  Surgeon: Rich Ramirez MD;  Location: [facility];  Service: Cardiology    CARDIAC CATHETERIZATION N/A 03/07/2023    Procedure: Cardiac temporary pacemaker;  Surgeon: Rich Ramirez MD;  Location: [facility];  Service: Cardiology    CARDIAC ELECTROPHYSIOLOGY PROCEDURE N/A 03/08/2023    Procedure: Cardiac icd implant;  Surgeon: Filiberto Olsen MD;  Location: [facility];  Service: Cardiology    COLONOSCOPY      CYSTOSCOPY  10/09/2014    Diagnostic    CYSTOSCOPY  06/29/2020    FL CYSTOGRAM  08/15/2022    FRACTURE SURGERY      left lower arm    FRACTURE SURGERY      left femur    HERNIA REPAIR      JOINT  "REPLACEMENT Right     hip    OTHER SURGICAL HISTORY  2011    Spinal anesthesia epidural    OR ARTHRP ACETBLR/PROX FEM PROSTC AGRFT/ALGRFT Right 2017    Procedure: ARTHROPLASTY HIP TOTAL ANTERIOR;  Surgeon: Roly Liu MD;  Location: [facility];  Service: Orthopedics    TONSILLECTOMY AND ADENOIDECTOMY      TRANSURETHRAL RESECTION OF PROSTATE      x 2    WRIST SURGERY       Social History   Social History     Substance and Sexual Activity   Alcohol Use Yes    Alcohol/week: 10.0 standard drinks of alcohol    Types: 10 Cans of beer per week    Comment: socially     Social History     Substance and Sexual Activity   Drug Use Not Currently    Types: Marijuana    Comment: \"medical marijuana\"     Social History     Tobacco Use   Smoking Status Former    Current packs/day: 0.00    Types: Cigarettes    Quit date: 1980    Years since quittin.0    Passive exposure: Past   Smokeless Tobacco Never     Family History   Problem Relation Age of Onset    No Known Problems Mother     Heart attack Father     Diabetes Brother     Prostate cancer Brother        Meds/Allergies       Medications Prior to Admission:     Acetaminophen 500 MG    ascorbic acid (VITAMIN C) 250 MG CHEW    atorvastatin (LIPITOR) 20 mg tablet    benzonatate (TESSALON) 200 MG capsule    Blood Pressure Monitoring (Omron 5 Series BP Monitor) PEE    dabigatran etexilate (PRADAXA) 150 mg capsu    docusate sodium (COLACE) 100 mg capsule    Lactobacillus (PROBIOTIC ACIDOPHILUS PO)    LORazepam (Ativan) 0.5 mg tablet    losartan (COZAAR) 50 mg tablet    montelukast (SINGULAIR) 10 mg tablet    nadolol (CORGARD) 80 MG tablet    Omega-3 Fatty Acids (FISH OIL) 1,000 mg    omeprazole (PriLOSEC) 40 MG capsule    spironolactone (ALDACTONE) 25 mg tablet    triamcinolone (KENALOG) 0.1 % ointment    vitamin B-12 (CYANOCOBALAMIN) 500 MCG TABS    zolpidem (AMBIEN) 5 mg tablet  Current Facility-Administered Medications   Medication Dose Route Frequency    " acetaminophen (TYLENOL) tablet 650 mg  650 mg Oral Q6H PRN    albuterol (PROVENTIL HFA,VENTOLIN HFA) inhaler 2 puff  2 puff Inhalation Q4H PRN    aluminum-magnesium hydroxide-simethicone (MAALOX) oral suspension 30 mL  30 mL Oral Q6H PRN    ascorbic acid (VITAMIN C) tablet 250 mg  250 mg Oral Daily    atorvastatin (LIPITOR) tablet 20 mg  20 mg Oral Daily With Dinner    benzonatate (TESSALON PERLES) capsule 100 mg  100 mg Oral TID PRN    cyanocobalamin (VITAMIN B-12) tablet 500 mcg  500 mcg Oral Daily    fish oil capsule 1,000 mg  1,000 mg Oral Daily    guaiFENesin (MUCINEX) 12 hr tablet 600 mg  600 mg Oral BID    LORazepam (ATIVAN) tablet 0.5 mg  0.5 mg Oral HS PRN    losartan (COZAAR) tablet 50 mg  50 mg Oral Daily    methylPREDNISolone sodium succinate (Solu-MEDROL) injection 40 mg  40 mg Intravenous Q12H CHET    montelukast (SINGULAIR) tablet 10 mg  10 mg Oral Daily    nadolol (CORGARD) tablet 80 mg  80 mg Oral Early Morning    pantoprazole (PROTONIX) EC tablet 40 mg  40 mg Oral Early Morning    [START ON 9/7/2024] predniSONE tablet 60 mg  60 mg Oral Daily    Followed by    [START ON 9/14/2024] predniSONE tablet 40 mg  40 mg Oral Daily    Followed by    [START ON 9/21/2024] predniSONE tablet 20 mg  20 mg Oral Daily    Followed by    [START ON 9/28/2024] predniSONE tablet 10 mg  10 mg Oral Daily    saccharomyces boulardii (FLORASTOR) capsule 250 mg  250 mg Oral BID    trimethobenzamide (TIGAN) IM injection 200 mg  200 mg Intramuscular Q6H PRN    zolpidem (AMBIEN) tablet 5 mg  5 mg Oral HS       Allergies   Allergen Reactions    Ciprofloxacin Other (See Comments)     LONG QT syndrome    Codeine Other (See Comments)     agitated    Sulfamethoxazole-Trimethoprim GI Intolerance, Abdominal Pain and Other (See Comments)     upset stomach    Augmentin [Amoxicillin-Pot Clavulanate] Rash     Livedo reticularis    Macrobid [Nitrofurantoin] Dizziness, Headache and Fatigue     Per Patient           Objective     Blood pressure  "115/62, pulse 70, temperature 97.9 °F (36.6 °C), resp. rate 18, height 5' 8\" (1.727 m), weight 86.6 kg (190 lb 14.7 oz), SpO2 96%. Body mass index is 29.03 kg/m².      Intake/Output Summary (Last 24 hours) at 9/5/2024 1459  Last data filed at 9/5/2024 1329  Gross per 24 hour   Intake 2010 ml   Output 750 ml   Net 1260 ml         PHYSICAL EXAM:      Physical Exam  Constitutional:       General: He is not in acute distress.     Appearance: Normal appearance. He is not ill-appearing or toxic-appearing.   HENT:      Head: Normocephalic and atraumatic.   Cardiovascular:      Rate and Rhythm: Normal rate and regular rhythm.      Heart sounds: No murmur heard.     No gallop.   Pulmonary:      Effort: Pulmonary effort is normal. No respiratory distress.      Breath sounds: No wheezing or rales.   Abdominal:      General: Abdomen is flat. There is no distension.      Tenderness: There is no abdominal tenderness. There is no guarding.   Musculoskeletal:      Right lower leg: No edema.      Left lower leg: No edema.   Neurological:      Mental Status: He is alert and oriented to person, place, and time.   Psychiatric:         Mood and Affect: Mood normal.         Behavior: Behavior normal.          Lab Results:   No results displayed because visit has over 200 results.          Imaging Studies: I have personally reviewed pertinent imaging studies.    Jesus Manuel Bueno DO  [facility] Internal Medicine PGY-3  "

## 2024-09-05 NOTE — ASSESSMENT & PLAN NOTE
Lacy rash over bilateral lower extremities from the thighs to the ankle.  Mildly improved since admission though still prominent.  Developed a rash shortly after completing Augmentin therapy  Question autoimmune component given history of CLL with autoimmune hemolytic anemia  Continue to monitor with supportive care time  Monitor with improvement in hemoglobin and steroids

## 2024-09-05 NOTE — PROGRESS NOTES
[facility]  Progress Note  Name: Murphy Willams I  MRN: [id number]  Unit/Bed#: [place] [address] -01 I Date of Admission: 9/2/2024   Date of Service: 9/5/2024 I Hospital Day: 3    Assessment & Plan   * Anemia  Assessment & Plan  Patient presented to the hospital with worsening cough and shortness of breath.  He is recently treated for pneumonia.  In addition was found to have acute anemia with at 8.7 on admission, and 5.7 the following day.  Has a distant history of autoimmune hemolytic anemia    Appreciate hematology/oncology recommendations  hemolysis workup generally unremarkable despite positive Narciso test  Recommending treating empirically with IV steroids followed by prednisone taper given his history  Transfused 1 unit PRBCs yesterday evening; will trasnfuse to an additional unit today  Continue to monitor and transfuse as necessary her for hemoglobin less than 7.0 g  Will need close outpatient follow-up with hematology/oncology; currently follows with Dr. Hanley and has an appointment scheduled for 2 weeks from now    CLL (chronic lymphocytic leukemia) (HCC)  Assessment & Plan  Currently follow with Dr. Hanley, not on any current medications  WBC significantly elevated from prior blood work  Could be reactive in the setting of recent pneumonia, however cannot rule out progression of CLL    Will continue to monitor while inpatient but will need close outpatient follow-up with Dr. Hanley.  Outpatient appointment is already scheduled for 2 weeks from now    Rash  Assessment & Plan  Developed a rash shortly after completing Augmentin therapy  Question autoimmune component given history of CLL with autoimmune hemolytic anemia  Continue to monitor with supportive care time  Monitor with improvement in hemoglobin and steroids    Pneumonia  Assessment & Plan  73-year-old male with PMH of CLL, A-fib, and HTN presented with cough  Completed full course of Augmentin as an outpatient  Seen by  pulmonology while inpatient who did not recommend any ongoing antibiotic therapy  Relatively asymptomatic other than a lingering cough  Stable on room air  Continue to monitor clinically    Vitamin B 12 deficiency  Assessment & Plan  Restart B12 supplement    Long QT syndrome type 2  Assessment & Plan  Avoid QT prolonging agents  Continue home nadolol    Paroxysmal A-fib (HCC)  Assessment & Plan  Continue nadolol   CTA showing lack of opacification of the left atrial appendage, cannot r/o thrombus-repeat echocardiogram completed  Plan for CT cardiac with pulm vein mapping  Pradaxa on hold given significant anemia    Hyponatremia  Assessment & Plan  Continues to be low  Currently on salt tabs  Will check urine studies for further evaluation    Pulmonary hypertension (HCC)  Assessment & Plan  Severe pulmonary hypertension and tricuspid regurgitation noted on echocardiogram  Cardiology following    Essential hypertension  Assessment & Plan  BP controlled  Home meds nadolol, losartan and aldactone  Hold Aldactone for now given hyponatremia           VTE Pharmacologic Prophylaxis:   Moderate Risk (Score 3-4) - Pharmacological DVT Prophylaxis Contraindicated. Sequential Compression Devices Ordered.    Mobility:   Basic Mobility Inpatient Raw Score: 18  JH-HLM Goal: 6: Walk 10 steps or more  JH-HLM Achieved: 7: Walk 25 feet or more  JH-HLM Goal achieved. Continue to encourage appropriate mobility.    Patient Centered Rounds: I performed bedside rounds with nursing staff today.   Discussions with Specialists or Other Care Team Provider: LOUIS.    Education and Discussions with Family / Patient: Updated  (wife and friend) at bedside.    Total Time Spent on Date of Encounter in care of patient: 40 mins. This time was spent on one or more of the following: performing physical exam; counseling and coordination of care; obtaining or reviewing history; documenting in the medical record; reviewing/ordering tests,  medications or procedures; communicating with other healthcare professionals and discussing with patient's family/caregivers.    Current Length of Stay: 3 day(s)  Current Patient Status: Inpatient   Certification Statement: The patient will continue to require additional inpatient hospital stay due to blood transfusion, serial lab monitoring, medication titration, dispo planning  Discharge Plan: Anticipate discharge in 24-48 hrs to home.    Code Status: Level 1 - Full Code    Subjective:   Patient seen and examined.  Tolerated last night's blood transfusion without any obvious reaction.  Otherwise he is feeling okay today.  No acute complaints.  Still has lingering cough.  No events reported overnight.    Objective:     Vitals:   Temp (24hrs), Av °F (36.7 °C), Min:97.4 °F (36.3 °C), Max:98.3 °F (36.8 °C)    Temp:  [97.4 °F (36.3 °C)-98.3 °F (36.8 °C)] 97.6 °F (36.4 °C)  HR:  [69-77] 73  Resp:  [16-20] 18  BP: (108-130)/(54-73) 129/65  SpO2:  [95 %-99 %] 98 %  Body mass index is 29.03 kg/m².     Input and Output Summary (last 24 hours):     Intake/Output Summary (Last 24 hours) at 2024 1202  Last data filed at 2024 0852  Gross per 24 hour   Intake 1710 ml   Output 750 ml   Net 960 ml       PHYSICAL EXAM:    Vitals signs reviewed  Constitutional   Awake and cooperative. NAD.   Head/Neck   Normocephalic. Atraumatic.   HEENT   No scleral icterus. EOMI.   Heart   Regular rate and rhythm. No murmers.   Lungs   Clear to auscultation bilaterally. Respirations unlaboured.   Abdomen   Soft. Nontender. Nondistended.    Skin   Skin color normal.    Extremities   No deformities. No peripheral edema.  Unchanged lacy appearing rash on his bilateral lower extremities   Neuro   Alert and oriented. No new deficits.   Psych   Mood stable. Affect normal.       Additional Data:     Labs:  Results from last 7 days   Lab Units 24  0400 24  0438 24  1007 24  0733   WBC Thousand/uL 125.54*   < > 125.72*  113.22*   HEMOGLOBIN g/dL 6.3*   < > 5.7* 8.7*   HEMATOCRIT % 19.1*   < > 16.4* 24.1*   PLATELETS Thousands/uL 265   < > 304 347   BANDS PCT %  --   --   --  2   SEGS PCT %  --   --  6*  --    LYMPHO PCT %  --   --  93* 78*   MONO PCT %  --   --  1* 0*   EOS PCT %  --   --  0 0    < > = values in this interval not displayed.     Results from last 7 days   Lab Units 09/05/24  0400   SODIUM mmol/L 128*   POTASSIUM mmol/L 5.2   CHLORIDE mmol/L 98   CO2 mmol/L 23   BUN mg/dL 17   CREATININE mg/dL 0.77   ANION GAP mmol/L 7   CALCIUM mg/dL 8.4   ALBUMIN g/dL 3.9   TOTAL BILIRUBIN mg/dL 1.57*   ALK PHOS U/L 92   ALT U/L 27   AST U/L 25   GLUCOSE RANDOM mg/dL 159*                 Results from last 7 days   Lab Units 09/03/24  0425 09/02/24  0733   PROCALCITONIN ng/ml 0.19 0.06       Lines/Drains:  Invasive Devices       Peripheral Intravenous Line  Duration             Peripheral IV 09/04/24 Distal;Right;Upper;Ventral (anterior) Arm 1 day                          Imaging: Reviewed radiology reports from this admission including: ultrasound(s) and ECHO    Recent Cultures (last 7 days):   Results from last 7 days   Lab Units 09/02/24  1655 09/02/24  1259   SPUTUM CULTURE  2+ Growth of  --    GRAM STAIN RESULT  1+ Epithelial cells per low power field*  2+ Gram positive cocci in pairs*  2+ Gram variable rods*  No polys seen*  --    LEGIONELLA URINARY ANTIGEN   --  Negative       Last 24 Hours Medication List:   Current Facility-Administered Medications   Medication Dose Route Frequency Provider Last Rate    acetaminophen  650 mg Oral Q6H PRN Cyril Nick MD      albuterol  2 puff Inhalation Q4H PRN Cyril Nick MD      aluminum-magnesium hydroxide-simethicone  30 mL Oral Q6H PRN Cyril Nick MD      ascorbic acid  250 mg Oral Daily Imelda Reno PA-C      atorvastatin  20 mg Oral Daily With Dinner Cyril Nick MD      benzonatate  100 mg Oral TID PRN Cyril Nick MD      vitamin B-12  500 mcg Oral Daily  Imelda Reno PA-C      fish oil  1,000 mg Oral Daily Imelda Reno PA-C      guaiFENesin  600 mg Oral BID Cyril Nick MD      LORazepam  0.5 mg Oral HS PRN Cyril Nick MD      losartan  50 mg Oral Daily Cyril Nick MD      methylPREDNISolone sodium succinate  40 mg Intravenous Q12H [facility] Evelia Florez MD      montelukast  10 mg Oral Daily Cyril Nick MD      nadolol  80 mg Oral Early Morning Titus Portillo DO      pantoprazole  40 mg Oral Early Morning Cyril Nick MD      [START ON 9/7/2024] predniSONE  60 mg Oral Daily Imelda Reno PA-C      Followed by    [START ON 9/14/2024] predniSONE  40 mg Oral Daily Imelda Reno PA-C      Followed by    [START ON 9/21/2024] predniSONE  20 mg Oral Daily Imelda Reno PA-C      Followed by    [START ON 9/28/2024] predniSONE  10 mg Oral Daily Imelda Reno PA-C      saccharomyces boulardii  250 mg Oral BID Imelda Reno PA-C      trimethobenzamide  200 mg Intramuscular Q6H PRN Cyril Nick MD      zolpidem  5 mg Oral HS Cyril Nick MD          Today, Patient Was Seen By: Titus Portillo DO    **Please Note: This note may have been constructed using a voice recognition system.**

## 2024-09-05 NOTE — PLAN OF CARE
Problem: PAIN - ADULT  Goal: Verbalizes/displays adequate comfort level or baseline comfort level  Description: Interventions:  - Encourage patient to monitor pain and request assistance  - Assess pain using appropriate pain scale  - Administer analgesics based on type and severity of pain and evaluate response  - Implement non-pharmacological measures as appropriate and evaluate response  - Consider cultural and social influences on pain and pain management  - Notify physician/advanced practitioner if interventions unsuccessful or patient reports new pain  Outcome: Progressing     Problem: INFECTION - ADULT  Goal: Absence or prevention of progression during hospitalization  Description: INTERVENTIONS:  - Assess and monitor for signs and symptoms of infection  - Monitor lab/diagnostic results  - Monitor all insertion sites, i.e. indwelling lines, tubes, and drains  - Monitor endotracheal if appropriate and nasal secretions for changes in amount and color  - Calvin appropriate cooling/warming therapies per order  - Administer medications as ordered  - Instruct and encourage patient and family to use good hand hygiene technique  - Identify and instruct in appropriate isolation precautions for identified infection/condition  Outcome: Progressing  Goal: Absence of fever/infection during neutropenic period  Description: INTERVENTIONS:  - Monitor WBC    Outcome: Progressing     Problem: SAFETY ADULT  Goal: Patient will remain free of falls  Description: INTERVENTIONS:  - Educate patient/family on patient safety including physical limitations  - Instruct patient to call for assistance with activity   - Consult OT/PT to assist with strengthening/mobility   - Keep Call bell within reach  - Keep bed low and locked with side rails adjusted as appropriate  - Keep care items and personal belongings within reach  - Initiate and maintain comfort rounds  - Make Fall Risk Sign visible to staff  - Apply yellow socks and bracelet  for high fall risk patients  - Consider moving patient to room near nurses station  Outcome: Progressing  Goal: Maintain or return to baseline ADL function  Description: INTERVENTIONS:  -  Assess patient's ability to carry out ADLs; assess patient's baseline for ADL function and identify physical deficits which impact ability to perform ADLs (bathing, care of mouth/teeth, toileting, grooming, dressing, etc.)  - Assess/evaluate cause of self-care deficits   - Assess range of motion  - Assess patient's mobility; develop plan if impaired  - Assess patient's need for assistive devices and provide as appropriate  - Encourage maximum independence but intervene and supervise when necessary  - Involve family in performance of ADLs  - Assess for home care needs following discharge   - Consider OT consult to assist with ADL evaluation and planning for discharge  - Provide patient education as appropriate  Outcome: Progressing  Goal: Maintains/Returns to pre admission functional level  Description: INTERVENTIONS:  - Perform AM-PAC 6 Click Basic Mobility/ Daily Activity assessment daily.  - Set and communicate daily mobility goal to care team and patient/family/caregiver.   - Collaborate with rehabilitation services on mobility goals if consulted  - Out of bed for toileting  - Record patient progress and toleration of activity level   Outcome: Progressing     Problem: DISCHARGE PLANNING  Goal: Discharge to home or other facility with appropriate resources  Description: INTERVENTIONS:  - Identify barriers to discharge w/patient and caregiver  - Arrange for needed discharge resources and transportation as appropriate  - Identify discharge learning needs (meds, wound care, etc.)  - Arrange for interpretive services to assist at discharge as needed  - Refer to Case Management Department for coordinating discharge planning if the patient needs post-hospital services based on physician/advanced practitioner order or complex needs  related to functional status, cognitive ability, or social support system  Outcome: Progressing     Problem: Knowledge Deficit  Goal: Patient/family/caregiver demonstrates understanding of disease process, treatment plan, medications, and discharge instructions  Description: Complete learning assessment and assess knowledge base.  Interventions:  - Provide teaching at level of understanding  - Provide teaching via preferred learning methods  Outcome: Progressing

## 2024-09-05 NOTE — ASSESSMENT & PLAN NOTE
Patient presented to the hospital with worsening cough and shortness of breath.  He is recently treated for pneumonia.  In addition was found to have acute anemia with at 8.7 on admission, and 5.7 the following day.  Has a distant history of autoimmune hemolytic anemia    Appreciate hematology/oncology recommendations  hemolysis workup generally unremarkable despite positive Narciso test  Given prior history of hemolytic anemia, hematology has recommended treating with steroids.  He is currently on prednisone taper.  Transfused total of 3 units PRBCs this hospitalization and hemoglobin now at 7.3 g  He has no signs or symptoms of significant bleeding and has been restarted on his home Pradaxa: Currently on hold for bone marrow biopsy.  The patient's hemoglobin has improved today.  Unclear on there including etiology though with concomitant rise in WBC concerns for active leukemia.  Flow cytometry pending  Bone marrow biopsy done today.

## 2024-09-05 NOTE — PROGRESS NOTES
"  Cardiology         Progress Note - Cardiology   Murphy Willams 73 y.o. male MRN: [id number]  Unit/Bed#: [place] [address] -01 Encounter: [id number]          Assessment/Recommendations/Discussion:   Abnormal CT chest revealing possible left atrial appendage thrombus  Community-acquired pneumonia with residual cough  Severe pulm hypertension, multifactorial  Long QT syndrome 2, on nadolol, dual-chamber/biventricular ICD placed 3/8/2023  Paroxysmal atrial fibrillation/flutter  Nonischemic cardiomyopathy, resolved  CLL with severe anemia        Cardiac CTA negative for left atrial appendage thrombus.  Appreciate radiology support  Cardiac status stable, cardiology will sign off.  D/W SLIM provider        Subjective: Patient seen and examined, feels well                Physical Exam:  GEN:  NAD  HEENT:  MMM, NCAT, pink conjunctiva, EOMI, nonicteric sclera  CV:  NO JVD/HJR, RR, NO M/R/G, +S1/S2, NO PARASTERNAL HEAVE/THRILL, NO LE EDEMA, NO HEPATIC SYSTOLIC PULSATION, WARM EXTREMITIES  RESP:  CTAB/L  ABD:  SOFT, NT, NO GROSS ORGANOMEGALY        Vitals:   /65 (BP Location: Right arm)   Pulse 73   Temp 97.6 °F (36.4 °C) (Oral)   Resp 18   Ht 5' 8\" (1.727 m)   Wt 86.6 kg (190 lb 14.7 oz)   SpO2 98%   BMI 29.03 kg/m²   Vitals:    09/02/24 1130 09/03/24 1500   Weight: 86.6 kg (190 lb 14.7 oz) 86.6 kg (190 lb 14.7 oz)       Intake/Output Summary (Last 24 hours) at 9/5/2024 1449  Last data filed at 9/5/2024 1329  Gross per 24 hour   Intake 2010 ml   Output 750 ml   Net 1260 ml       TELEMETRY: Off telemetry  Lab Results:  Results from last 7 days   Lab Units 09/05/24  0400   WBC Thousand/uL 125.54*   HEMOGLOBIN g/dL 6.3*   HEMATOCRIT % 19.1*   PLATELETS Thousands/uL 265     Results from last 7 days   Lab Units 09/05/24  0400   POTASSIUM mmol/L 5.2   CHLORIDE mmol/L 98   CO2 mmol/L 23   BUN mg/dL 17   CREATININE mg/dL 0.77   CALCIUM mg/dL 8.4   ALK PHOS U/L 92   ALT U/L 27   AST U/L 25     Results from last 7 days   Lab " Units 09/05/24  0400   POTASSIUM mmol/L 5.2   CHLORIDE mmol/L 98   CO2 mmol/L 23   BUN mg/dL 17   CREATININE mg/dL 0.77   CALCIUM mg/dL 8.4           Medications:    Current Facility-Administered Medications:     acetaminophen (TYLENOL) tablet 650 mg, 650 mg, Oral, Q6H PRN, Cyril Nick MD, 650 mg at 09/03/24 2128    albuterol (PROVENTIL HFA,VENTOLIN HFA) inhaler 2 puff, 2 puff, Inhalation, Q4H PRN, Cyril Nick MD    aluminum-magnesium hydroxide-simethicone (MAALOX) oral suspension 30 mL, 30 mL, Oral, Q6H PRN, Cyril Nick MD, 30 mL at 09/03/24 1924    ascorbic acid (VITAMIN C) tablet 250 mg, 250 mg, Oral, Daily, Imelda Reno PA-C, 250 mg at 09/05/24 0911    atorvastatin (LIPITOR) tablet 20 mg, 20 mg, Oral, Daily With Dinner, Cyril Nick MD, 20 mg at 09/04/24 1736    benzonatate (TESSALON PERLES) capsule 100 mg, 100 mg, Oral, TID PRN, Cyril Nick MD, 100 mg at 09/05/24 0941    cyanocobalamin (VITAMIN B-12) tablet 500 mcg, 500 mcg, Oral, Daily, Imelda Reno PA-C, 500 mcg at 09/05/24 0910    fish oil capsule 1,000 mg, 1,000 mg, Oral, Daily, Imelda Reno PA-C, 1,000 mg at 09/05/24 0908    guaiFENesin (MUCINEX) 12 hr tablet 600 mg, 600 mg, Oral, BID, Cyril Nick MD, 600 mg at 09/05/24 0910    LORazepam (ATIVAN) tablet 0.5 mg, 0.5 mg, Oral, HS PRN, Cyril Nick MD, 0.5 mg at 09/04/24 2019    losartan (COZAAR) tablet 50 mg, 50 mg, Oral, Daily, Cyril Nick MD, 50 mg at 09/05/24 0910    methylPREDNISolone sodium succinate (Solu-MEDROL) injection 40 mg, 40 mg, Intravenous, Q12H CHET, Evelia Florez MD, 40 mg at 09/05/24 0912    montelukast (SINGULAIR) tablet 10 mg, 10 mg, Oral, Daily, Cyril Nick MD, 10 mg at 09/05/24 0910    nadolol (CORGARD) tablet 80 mg, 80 mg, Oral, Early Morning, Titus Portillo DO, 80 mg at 09/05/24 1114    pantoprazole (PROTONIX) EC tablet 40 mg, 40 mg, Oral, Early Morning, Cyril Nick MD, 40 mg at 09/05/24 0610    [START ON 9/7/2024]  predniSONE tablet 60 mg, 60 mg, Oral, Daily **FOLLOWED BY** [START ON 9/14/2024] predniSONE tablet 40 mg, 40 mg, Oral, Daily **FOLLOWED BY** [START ON 9/21/2024] predniSONE tablet 20 mg, 20 mg, Oral, Daily **FOLLOWED BY** [START ON 9/28/2024] predniSONE tablet 10 mg, 10 mg, Oral, Daily, Imelda Reno PA-C    saccharomyces boulardii (FLORASTOR) capsule 250 mg, 250 mg, Oral, BID, Imelda Reno PA-C, 250 mg at 09/05/24 0908    trimethobenzamide (TIGAN) IM injection 200 mg, 200 mg, Intramuscular, Q6H PRN, Cyril Nick MD, 200 mg at 09/02/24 1806    zolpidem (AMBIEN) tablet 5 mg, 5 mg, Oral, HS, Cyril Nick MD, 5 mg at 09/04/24 2238    This note was completed in part utilizing [employer] Direct Software.  Grammatical errors, random word insertions, spelling mistakes, and incomplete sentences may be an occasional consequence of this system secondary to software limitations, ambient noise, and hardware issues.  If you have any questions or concerns about the content, text, or information contained within the body of this dictation, please contact the provider for clarification.

## 2024-09-05 NOTE — ASSESSMENT & PLAN NOTE
02-Mar-2020 08:00 Severe pulmonary hypertension and tricuspid regurgitation noted on echocardiogram  Cardiology following

## 2024-09-05 NOTE — CASE MANAGEMENT
Case Management Assessment & Discharge Planning Note    Patient name Murphy Willams  Location South 2 /South 2 M* MRN [id number]  : 1950 Date 2024       Current Admission Date: 2024  Current Admission Diagnosis:Anemia   Patient Active Problem List    Diagnosis Date Noted Date Diagnosed    Anemia 2024     Rash 2024     Livedo reticularis 2024     Pneumonia 2024     Arthritis 10/25/2023     Vitamin B 12 deficiency 10/25/2023     Squamous cell carcinoma of trunk 10/23/2023     Polyp of colon 2023     Chronic combined systolic and diastolic congestive heart failure (HCC) 2023     Long QT syndrome type 2 03/10/2023     Narrow complex tachycardia 2023     Paroxysmal A-fib (HCC) 2023     Hyponatremia 2023     Hyperglycemia 10/17/2022     Alcohol use 2022     Diverticulum of bladder 2022     Encysted hydrocele 2021     Autoimmune hemolytic anemia (HCC) 2021     GAVE (gastric antral vascular ectasia) 2021     BPH with obstruction/lower urinary tract symptoms 2020     Hypercholesterolemia 2019     Depression, recurrent ([facility]) 2019     DIXIE not currently treated 10/17/2018     DDD (degenerative disc disease), cervical 08/15/2018     Essential hypertension 2017     CLL (chronic lymphocytic leukemia) ([facility]) 2017     Pulmonary hypertension ([facility]) 05/15/2017     Spinal stenosis of lumbar region 05/15/2017     Erectile dysfunction of non-organic origin 2013     Gastroesophageal reflux disease without esophagitis 2012     Allergic rhinitis due to pollen 2012       LOS (days): 3  Geometric Mean LOS (GMLOS) (days): 4.1  Days to GMLOS:0.9     OBJECTIVE:    Risk of Unplanned Readmission Score: 19.32         Current admission status: Inpatient       Preferred Pharmacy:   [facility] [place] [address]  [address]  [facility] [address]  Phone:  [phone] Fax: [phone]    [id number] - [place] - [address]  [address] [facility] [address]  Phone: [phone] Fax: [phone]    [facility] [place] - [address]  [facility],  [address]  [facility]  [address] [place]  [place] [address]  Phone: [phone] Fax: [phone]    [id number] [place] - [address] [facility]  [address] [facility] [address]  Phone: [phone] Fax: [phone]    Primary Care Provider: Pravin Ortega Jr, MD    Primary Insurance: MEDICARE  Secondary Insurance: BLUE CROSS    ASSESSMENT:  Active Health Care Proxies    There are no active Health Care Proxies on file.       Advance Directives  Does patient have a Health Care POA?: Yes (Patient's spouse, Mary)  Does patient have Advance Directives?: Yes  Advance Directives: Living will  Primary Contact: Tangela Willams (Mary) (Spouse)  [phone] (Home Phone)    Patient Information  Admitted from:: Home  Mental Status: Alert  During Assessment patient was accompanied by: Spouse, Friend  Assessment information provided by:: Patient  Primary Caregiver: Self  Support Systems: Spouse/significant other, Self  County of Residence: [place]  [place] [facility] do you live in?: [place]  Home entry access options. Select all that apply.: No steps to enter home, Stairs  Number of steps to enter home.: One Flight  Type of Current Residence: Apartment  Living Arrangements: Lives w/ Spouse/significant other  Is patient a ?: No    Activities of Daily Living Prior to Admission  Functional Status: Independent  Completes ADLs independently?: Yes  Ambulates independently?: Yes  Does patient use assisted devices?: No  Does patient currently own DME?: No  Does patient have a history of Outpatient Therapy (PT/OT)?: No  Does the patient have a history of Short-Term Rehab?: No  Does patient have a history of HHC?: No  Does patient currently have HHC?: No    Patient  Information Continued  Income Source: Pension/detention (+ spouse SS/ pension)  Does patient have prescription coverage?: Yes  Does patient receive dialysis treatments?: No  Does patient have a history of substance abuse?: No  Does patient have a history of Mental Health Diagnosis?: No    Social Determinants of Health (SDOH)      Flowsheet Row Most Recent Value   Housing Stability    In the last 12 months, was there a time when you were not able to pay the mortgage or rent on time? N   In the past 12 months, how many times have you moved where you were living? 0   At any time in the past 12 months, were you homeless or living in a shelter (including now)? N   Transportation Needs    In the past 12 months, has lack of transportation kept you from medical appointments or from getting medications? no   In the past 12 months, has lack of transportation kept you from meetings, work, or from getting things needed for daily living? No   Food Insecurity    Within the past 12 months, you worried that your food would run out before you got the money to buy more. Never true   Within the past 12 months, the food you bought just didn't last and you didn't have money to get more. Never true   Utilities    In the past 12 months has the electric, gas, oil, or water company threatened to shut off services in your home? No            DISCHARGE DETAILS:    Discharge planning discussed with:: Patient        CM contacted family/caregiver?: No- see comments (Patient alert)  Were Treatment Team discharge recommendations reviewed with patient/caregiver?: Yes  Did patient/caregiver verbalize understanding of patient care needs?: N/A- going to facility  Were patient/caregiver advised of the risks associated with not following Treatment Team discharge recommendations?: Yes    Contacts  Patient Contacts: Mary Willams  Relationship to Patient:: Family  Contact Method: Phone  Phone Number: [phone]  Reason/Outcome: Continuity of Care,  Emergency Contact, Discharge Planning    Requested Home Health Care         Is the patient interested in HHC at discharge?: No    DME Referral Provided  Referral made for DME?: No    Treatment Team Recommendation: Home  Discharge Destination Plan:: Home  Transport at Discharge : Family       Additional Comments: CM met with patient at bedside to complete assessment; patient alert, patient's spouse at bedside. Patient confirmed POA, spouse Mary and has LW. Patient confirmed lives in an apartment with significant other. Patient confirmed drives self for transportation. Patient declines use of DME or oxygen. Patient denies MH diagnosis. Patient denies use of D+A. Patient confirmed history of STR (2017). Patient requesting follow up on blood cultures; CM sent follow up message to bedside nurse and attending. CM to follow for further discharge planning.    Marjorie Worrell,

## 2024-09-05 NOTE — PLAN OF CARE
Problem: PAIN - ADULT  Goal: Verbalizes/displays adequate comfort level or baseline comfort level  Description: Interventions:  - Encourage patient to monitor pain and request assistance  - Assess pain using appropriate pain scale  - Administer analgesics based on type and severity of pain and evaluate response  - Implement non-pharmacological measures as appropriate and evaluate response  - Consider cultural and social influences on pain and pain management  - Notify physician/advanced practitioner if interventions unsuccessful or patient reports new pain  Outcome: Progressing     Problem: INFECTION - ADULT  Goal: Absence or prevention of progression during hospitalization  Description: INTERVENTIONS:  - Assess and monitor for signs and symptoms of infection  - Monitor lab/diagnostic results  - Monitor all insertion sites, i.e. indwelling lines, tubes, and drains  - Monitor endotracheal if appropriate and nasal secretions for changes in amount and color  - [place] appropriate cooling/warming therapies per order  - Administer medications as ordered  - Instruct and encourage patient and family to use good hand hygiene technique  - Identify and instruct in appropriate isolation precautions for identified infection/condition  Outcome: Progressing  Goal: Absence of fever/infection during neutropenic period  Description: INTERVENTIONS:  - Monitor WBC    Outcome: Progressing     Problem: SAFETY ADULT  Goal: Patient will remain free of falls  Description: INTERVENTIONS:  - Educate patient/family on patient safety including physical limitations  - Instruct patient to call for assistance with activity   - Consult OT/PT to assist with strengthening/mobility   - Keep Call bell within reach  - Keep bed low and locked with side rails adjusted as appropriate  - Keep care items and personal belongings within reach  - Initiate and maintain comfort rounds  - Make Fall Risk Sign visible to staff  - Offer Toileting every 2 Hours,  in advance of need  - Initiate/Maintain bed alarm  - Obtain necessary fall risk management equipment: bed alarm  - Apply yellow socks and bracelet for high fall risk patients  - Consider moving patient to room near nurses station  Outcome: Progressing  Goal: Maintain or return to baseline ADL function  Description: INTERVENTIONS:  -  Assess patient's ability to carry out ADLs; assess patient's baseline for ADL function and identify physical deficits which impact ability to perform ADLs (bathing, care of mouth/teeth, toileting, grooming, dressing, etc.)  - Assess/evaluate cause of self-care deficits   - Assess range of motion  - Assess patient's mobility; develop plan if impaired  - Assess patient's need for assistive devices and provide as appropriate  - Encourage maximum independence but intervene and supervise when necessary  - Involve family in performance of ADLs  - Assess for home care needs following discharge   - Consider OT consult to assist with ADL evaluation and planning for discharge  - Provide patient education as appropriate  Outcome: Progressing  Goal: Maintains/Returns to pre admission functional level  Description: INTERVENTIONS:  - Perform AM-PAC 6 Click Basic Mobility/ Daily Activity assessment daily.  - Set and communicate daily mobility goal to care team and patient/family/caregiver.   - Collaborate with rehabilitation services on mobility goals if consulted  - Perform Range of Motion 3 times a day.  - Reposition patient every 2 hours.  - Dangle patient 3 times a day  - Stand patient 3 times a day  - Ambulate patient 3 times a day  - Out of bed to chair 3 times a day   - Out of bed for meals 3 times a day  - Out of bed for toileting  - Record patient progress and toleration of activity level   Outcome: Progressing     Problem: DISCHARGE PLANNING  Goal: Discharge to home or other facility with appropriate resources  Description: INTERVENTIONS:  - Identify barriers to discharge w/patient and  caregiver  - Arrange for needed discharge resources and transportation as appropriate  - Identify discharge learning needs (meds, wound care, etc.)  - Arrange for interpretive services to assist at discharge as needed  - Refer to Case Management Department for coordinating discharge planning if the patient needs post-hospital services based on physician/advanced practitioner order or complex needs related to functional status, cognitive ability, or social support system  Outcome: Progressing     Problem: Knowledge Deficit  Goal: Patient/family/caregiver demonstrates understanding of disease process, treatment plan, medications, and discharge instructions  Description: Complete learning assessment and assess knowledge base.  Interventions:  - Provide teaching at level of understanding  - Provide teaching via preferred learning methods  Outcome: Progressing

## 2024-09-05 NOTE — ASSESSMENT & PLAN NOTE
Patient presented to the hospital with worsening cough and shortness of breath.  He is recently treated for pneumonia.  In addition was found to have acute anemia with at 8.7 on admission, and 5.7 the following day.  Has a distant history of autoimmune hemolytic anemia    Appreciate hematology/oncology recommendations  hemolysis workup generally unremarkable despite positive Narciso test  Recommending treating empirically with IV steroids followed by prednisone taper given his history  Transfuse 1 unit PRBCs yesterday evening; will transition to an additional unit today  Continue to monitor and transfuse as necessary her for hemoglobin less than 7.0 g  Will need close outpatient follow-up with hematology/oncology; currently follows with Dr. Hanley and has an appointment scheduled for 2 weeks from now

## 2024-09-05 NOTE — ASSESSMENT & PLAN NOTE
Currently follow with Dr. Hanley, not on any current medications  WBC significantly elevated from prior blood work  Could be reactive in the setting of recent pneumonia, however cannot rule out progression of CLL    Will continue to monitor while inpatient but will need close outpatient follow-up with Dr. Hanley.  Outpatient appointment is already scheduled for 2 weeks from now

## 2024-09-05 NOTE — ASSESSMENT & PLAN NOTE
73-year-old male with PMH of CLL, A-fib, and HTN presented with cough  Completed full course of Augmentin as an outpatient  Seen by pulmonology while inpatient who did not recommend any ongoing antibiotic therapy  Relatively asymptomatic other than a lingering cough  Stable on room air  Continue to monitor clinically

## 2024-09-05 NOTE — ASSESSMENT & PLAN NOTE
Continue nadolol   CTA showing lack of opacification of the left atrial appendage, cannot r/o thrombus  Repeat echocardiogram again atrial scanning performed: No evidence of thrombus  On Pradaxa.

## 2024-09-05 NOTE — ASSESSMENT & PLAN NOTE
BP controlled  Home meds nadolol, losartan and aldactone  Aldactone and losartan on hold given electrolyte issues

## 2024-09-06 ENCOUNTER — TELEPHONE (OUTPATIENT)
Age: 74
End: 2024-09-06

## 2024-09-06 PROBLEM — E87.5 HYPERKALEMIA: Status: ACTIVE | Noted: 2024-09-06

## 2024-09-06 LAB
ABO GROUP BLD BPU: NORMAL
ALBUMIN SERPL BCG-MCNC: 3.8 G/DL (ref 3.5–5)
ALP SERPL-CCNC: 79 U/L (ref 34–104)
ALT SERPL W P-5'-P-CCNC: 51 U/L (ref 7–52)
ANION GAP SERPL CALCULATED.3IONS-SCNC: 2 MMOL/L (ref 4–13)
ANION GAP SERPL CALCULATED.3IONS-SCNC: 4 MMOL/L (ref 4–13)
ANISOCYTOSIS BLD QL SMEAR: PRESENT
AST SERPL W P-5'-P-CCNC: 37 U/L (ref 13–39)
ATRIAL RATE: 71 BPM
BASOPHILS # BLD MANUAL: 0 THOUSAND/UL (ref 0–0.1)
BASOPHILS NFR MAR MANUAL: 0 % (ref 0–1)
BILIRUB SERPL-MCNC: 1.22 MG/DL (ref 0.2–1)
BPU ID: NORMAL
BUN SERPL-MCNC: 21 MG/DL (ref 5–25)
BUN SERPL-MCNC: 24 MG/DL (ref 5–25)
CALCIUM SERPL-MCNC: 8.4 MG/DL (ref 8.4–10.2)
CALCIUM SERPL-MCNC: 8.8 MG/DL (ref 8.4–10.2)
CHLORIDE SERPL-SCNC: 98 MMOL/L (ref 96–108)
CHLORIDE SERPL-SCNC: 99 MMOL/L (ref 96–108)
CO2 SERPL-SCNC: 26 MMOL/L (ref 21–32)
CO2 SERPL-SCNC: 26 MMOL/L (ref 21–32)
CREAT SERPL-MCNC: 0.75 MG/DL (ref 0.6–1.3)
CREAT SERPL-MCNC: 0.78 MG/DL (ref 0.6–1.3)
CROSSMATCH: NORMAL
EOSINOPHIL # BLD MANUAL: 0 THOUSAND/UL (ref 0–0.4)
EOSINOPHIL NFR BLD MANUAL: 0 % (ref 0–6)
GFR SERPL CREATININE-BSD FRML MDRD: 90 ML/MIN/1.73SQ M
GFR SERPL CREATININE-BSD FRML MDRD: 92 ML/MIN/1.73SQ M
GLUCOSE SERPL-MCNC: 136 MG/DL (ref 65–140)
GLUCOSE SERPL-MCNC: 153 MG/DL (ref 65–140)
GLUCOSE SERPL-MCNC: 153 MG/DL (ref 65–140)
GLUCOSE SERPL-MCNC: 182 MG/DL (ref 65–140)
HCT VFR BLD AUTO: 22.6 % (ref 36.5–49.3)
HGB BLD-MCNC: 7.3 G/DL (ref 12–17)
LYMPHOCYTES # BLD AUTO: 128.94 THOUSAND/UL (ref 0.6–4.47)
LYMPHOCYTES # BLD AUTO: 83 % (ref 14–44)
MACROCYTES BLD QL AUTO: PRESENT
MCH RBC QN AUTO: 37.2 PG (ref 26.8–34.3)
MCHC RBC AUTO-ENTMCNC: 32.3 G/DL (ref 31.4–37.4)
MCV RBC AUTO: 115 FL (ref 82–98)
MONOCYTES # BLD AUTO: 1.39 THOUSAND/UL (ref 0–1.22)
MONOCYTES NFR BLD: 1 % (ref 4–12)
NEUTROPHILS # BLD MANUAL: 8.32 THOUSAND/UL (ref 1.85–7.62)
NEUTS SEG NFR BLD AUTO: 6 % (ref 43–75)
NRBC BLD AUTO-RTO: 1 /100 WBC (ref 0–2)
OSMOLALITY UR/SERPL-RTO: 281 MMOL/KG (ref 282–298)
OSMOLALITY UR: 457 MMOL/KG
PLATELET # BLD AUTO: 255 THOUSANDS/UL (ref 149–390)
PLATELET BLD QL SMEAR: ADEQUATE
PMV BLD AUTO: 9.9 FL (ref 8.9–12.7)
POLYCHROMASIA BLD QL SMEAR: PRESENT
POTASSIUM SERPL-SCNC: 5.6 MMOL/L (ref 3.5–5.3)
POTASSIUM SERPL-SCNC: 6.3 MMOL/L (ref 3.5–5.3)
POTASSIUM SERPL-SCNC: 6.6 MMOL/L (ref 3.5–5.3)
PR INTERVAL: 124 MS
PROT SERPL-MCNC: 6.6 G/DL (ref 6.4–8.4)
QRS AXIS: 6 DEGREES
QRSD INTERVAL: 170 MS
QT INTERVAL: 538 MS
QTC INTERVAL: 581 MS
RBC # BLD AUTO: 1.96 MILLION/UL (ref 3.88–5.62)
RBC MORPH BLD: PRESENT
SMUDGE CELLS BLD QL SMEAR: PRESENT
SODIUM SERPL-SCNC: 127 MMOL/L (ref 135–147)
SODIUM SERPL-SCNC: 128 MMOL/L (ref 135–147)
T WAVE AXIS: 169 DEGREES
UNIT DISPENSE STATUS: NORMAL
UNIT PRODUCT CODE: NORMAL
UNIT PRODUCT VOLUME: 350 ML
UNIT RH: NORMAL
VARIANT LYMPHS # BLD AUTO: 10 %
VENTRICULAR RATE: 70 BPM
WBC # BLD AUTO: 138.64 THOUSAND/UL (ref 4.31–10.16)

## 2024-09-06 PROCEDURE — 93010 ELECTROCARDIOGRAM REPORT: CPT | Performed by: INTERNAL MEDICINE

## 2024-09-06 PROCEDURE — 94760 N-INVAS EAR/PLS OXIMETRY 1: CPT

## 2024-09-06 PROCEDURE — 99233 SBSQ HOSP IP/OBS HIGH 50: CPT | Performed by: INTERNAL MEDICINE

## 2024-09-06 PROCEDURE — 84132 ASSAY OF SERUM POTASSIUM: CPT

## 2024-09-06 PROCEDURE — 85007 BL SMEAR W/DIFF WBC COUNT: CPT | Performed by: INTERNAL MEDICINE

## 2024-09-06 PROCEDURE — 80053 COMPREHEN METABOLIC PANEL: CPT | Performed by: INTERNAL MEDICINE

## 2024-09-06 PROCEDURE — 80048 BASIC METABOLIC PNL TOTAL CA: CPT | Performed by: INTERNAL MEDICINE

## 2024-09-06 PROCEDURE — 99222 1ST HOSP IP/OBS MODERATE 55: CPT | Performed by: INTERNAL MEDICINE

## 2024-09-06 PROCEDURE — 85027 COMPLETE CBC AUTOMATED: CPT | Performed by: INTERNAL MEDICINE

## 2024-09-06 PROCEDURE — 82948 REAGENT STRIP/BLOOD GLUCOSE: CPT

## 2024-09-06 PROCEDURE — 83930 ASSAY OF BLOOD OSMOLALITY: CPT | Performed by: INTERNAL MEDICINE

## 2024-09-06 PROCEDURE — 93005 ELECTROCARDIOGRAM TRACING: CPT

## 2024-09-06 PROCEDURE — 94644 CONT INHLJ TX 1ST HOUR: CPT

## 2024-09-06 PROCEDURE — 94640 AIRWAY INHALATION TREATMENT: CPT

## 2024-09-06 PROCEDURE — 94664 DEMO&/EVAL PT USE INHALER: CPT

## 2024-09-06 RX ORDER — DABIGATRAN ETEXILATE 150 MG/1
150 CAPSULE ORAL EVERY 12 HOURS SCHEDULED
Status: DISCONTINUED | OUTPATIENT
Start: 2024-09-06 | End: 2024-09-08

## 2024-09-06 RX ORDER — CALCIUM GLUCONATE 20 MG/ML
1 INJECTION, SOLUTION INTRAVENOUS ONCE
Status: COMPLETED | OUTPATIENT
Start: 2024-09-06 | End: 2024-09-07

## 2024-09-06 RX ORDER — DEXTROSE MONOHYDRATE 25 G/50ML
25 INJECTION, SOLUTION INTRAVENOUS ONCE
Status: COMPLETED | OUTPATIENT
Start: 2024-09-06 | End: 2024-09-06

## 2024-09-06 RX ORDER — SODIUM CHLORIDE FOR INHALATION 0.9 %
VIAL, NEBULIZER (ML) INHALATION
Status: COMPLETED
Start: 2024-09-06 | End: 2024-09-06

## 2024-09-06 RX ORDER — FUROSEMIDE 20 MG
20 TABLET ORAL DAILY
Status: DISCONTINUED | OUTPATIENT
Start: 2024-09-06 | End: 2024-09-12 | Stop reason: HOSPADM

## 2024-09-06 RX ORDER — POLYETHYLENE GLYCOL 3350 17 G/17G
17 POWDER, FOR SOLUTION ORAL DAILY PRN
Status: DISCONTINUED | OUTPATIENT
Start: 2024-09-06 | End: 2024-09-12 | Stop reason: HOSPADM

## 2024-09-06 RX ORDER — ALBUTEROL SULFATE 5 MG/ML
10 SOLUTION RESPIRATORY (INHALATION) ONCE
Status: COMPLETED | OUTPATIENT
Start: 2024-09-06 | End: 2024-09-06

## 2024-09-06 RX ORDER — LORAZEPAM 0.5 MG/1
0.5 TABLET ORAL EVERY 8 HOURS PRN
Status: DISCONTINUED | OUTPATIENT
Start: 2024-09-06 | End: 2024-09-12 | Stop reason: HOSPADM

## 2024-09-06 RX ADMIN — CALCIUM GLUCONATE 1 G: 20 INJECTION, SOLUTION INTRAVENOUS at 06:36

## 2024-09-06 RX ADMIN — GUAIFENESIN 600 MG: 600 TABLET, EXTENDED RELEASE ORAL at 08:37

## 2024-09-06 RX ADMIN — LOSARTAN POTASSIUM 50 MG: 50 TABLET, FILM COATED ORAL at 08:37

## 2024-09-06 RX ADMIN — SODIUM CHLORIDE 10 UNITS: 9 INJECTION, SOLUTION INTRAMUSCULAR; INTRAVENOUS; SUBCUTANEOUS at 14:48

## 2024-09-06 RX ADMIN — ATORVASTATIN CALCIUM 20 MG: 20 TABLET, FILM COATED ORAL at 17:30

## 2024-09-06 RX ADMIN — LORAZEPAM 0.5 MG: 0.5 TABLET ORAL at 21:41

## 2024-09-06 RX ADMIN — DEXTROSE MONOHYDRATE 25 ML: 25 INJECTION, SOLUTION INTRAVENOUS at 14:48

## 2024-09-06 RX ADMIN — ALBUTEROL SULFATE 10 MG: 2.5 SOLUTION RESPIRATORY (INHALATION) at 10:04

## 2024-09-06 RX ADMIN — Medication 250 MG: at 17:30

## 2024-09-06 RX ADMIN — SODIUM ZIRCONIUM CYCLOSILICATE 10 G: 10 POWDER, FOR SUSPENSION ORAL at 08:37

## 2024-09-06 RX ADMIN — METHYLPREDNISOLONE SODIUM SUCCINATE 40 MG: 40 INJECTION, POWDER, FOR SOLUTION INTRAMUSCULAR; INTRAVENOUS at 08:37

## 2024-09-06 RX ADMIN — CALCIUM GLUCONATE 1 G: 20 INJECTION, SOLUTION INTRAVENOUS at 14:55

## 2024-09-06 RX ADMIN — MONTELUKAST 10 MG: 10 TABLET, FILM COATED ORAL at 08:37

## 2024-09-06 RX ADMIN — DABIGATRAN ETEXILATE MESYLATE 150 MG: 150 CAPSULE ORAL at 21:41

## 2024-09-06 RX ADMIN — SODIUM CHLORIDE 10 UNITS: 9 INJECTION, SOLUTION INTRAVENOUS at 06:36

## 2024-09-06 RX ADMIN — DABIGATRAN ETEXILATE MESYLATE 150 MG: 150 CAPSULE ORAL at 14:48

## 2024-09-06 RX ADMIN — FUROSEMIDE 20 MG: 20 TABLET ORAL at 14:48

## 2024-09-06 RX ADMIN — SODIUM BICARBONATE 50 MEQ: 84 INJECTION INTRAVENOUS at 14:47

## 2024-09-06 RX ADMIN — METHYLPREDNISOLONE SODIUM SUCCINATE 40 MG: 40 INJECTION, POWDER, FOR SOLUTION INTRAMUSCULAR; INTRAVENOUS at 21:42

## 2024-09-06 RX ADMIN — NADOLOL 80 MG: 20 TABLET ORAL at 06:24

## 2024-09-06 RX ADMIN — PANTOPRAZOLE SODIUM 40 MG: 40 TABLET, DELAYED RELEASE ORAL at 06:24

## 2024-09-06 RX ADMIN — GUAIFENESIN 600 MG: 600 TABLET, EXTENDED RELEASE ORAL at 21:41

## 2024-09-06 RX ADMIN — DEXTROSE MONOHYDRATE 25 ML: 25 INJECTION, SOLUTION INTRAVENOUS at 06:35

## 2024-09-06 RX ADMIN — LIDOCAINE 1 PATCH: 700 PATCH TOPICAL at 08:40

## 2024-09-06 RX ADMIN — Medication 250 MG: at 08:37

## 2024-09-06 RX ADMIN — Medication 12 ML: at 10:05

## 2024-09-06 RX ADMIN — LORAZEPAM 0.5 MG: 0.5 TABLET ORAL at 08:37

## 2024-09-06 RX ADMIN — CYANOCOBALAMIN TAB 500 MCG 500 MCG: 500 TAB at 08:37

## 2024-09-06 RX ADMIN — OMEGA-3 FATTY ACIDS CAP 1000 MG 1000 MG: 1000 CAP at 08:37

## 2024-09-06 NOTE — CONSULTS
Consultation - Nephrology   Murphy TIFFANIE Willams 73 y.o. male MRN: [id number]  Unit/Bed#: [place] [address] -[id number] Encounter: [id number]    ASSESSMENT and PLAN:  #1 hyperkalemia, most recent potassium 6.3.  Noted patient is on ARB and previously on spironolactone, also concern for hemolytic anemia, CLL  Noted previous recent potassium were normal, kidney function normal  Status post medical treatment.  Recommend to follow low potassium diet, patient received Lokelma.  Plan to repeat BMP at noon without using tourniquet or making a fist  IF Hyperkalemia persists, consider discontinue ARB also, noted he received a dose this morning.  Spironolactone currently on hold.  If potassium remains elevated consider adding low-dose loop diuretic to furosemide 20 mg daily.    2 acute on chronic hyponatremia, serum sodium fluctuating in the high 120s to low 130s on and off back since 2014  Recommend fluid restriction 1.8 L daily.  Follow repeat labs, consider adding low-dose diuretics as above    3 anemia, CLL with concern for progression, hematology oncology on board.  Initial concern for autoimmune hemolytic anemia that he has had in the past, hemolytic workup was unremarkable but patient was started empirically on prednisone taper per hematology.  Status post blood transfusion, monitor H&H    #4 abnormal CT scan of the chest with right middle lobe infiltrates, pulmonology on board, patient was previously treated with 14 days of Augmentin, currently off antibiotics, plan to repeat CAT scan in 4 to 6 weeks    5 Long QT syndrome on nadolol, dual-chamber biventricular ICD, paroxysmal atrial fibrillation, flutter, nonischemic cardiomyopathy, severe pulmonary hypertension  Abnormal CT scan of the chest revealing possible left atrial appendage thrombus, cardiac CTA negative for left atrial appendage thrombus  Cardiology on board      SUMMARY OF RECOMMENDATIONS:  Follow a low K diet  Start fluid restriction 1.8 L daily  Status post medical Rx  including Lokelma  Follow repeat BMP in 4 hours without using tourniquet or making a fist  Hold ARB and spironolactone for now  If K still elevated start low dose loop diuretic as furosemide 20 mg daily and give another medical rx  Discussed with IM attending, he agreed with the plan       HISTORY OF PRESENT ILLNESS:  Requesting Physician: Titus Portillo, *  Reason for Consult: Hyperkalemia, acute on chronic hyponatremia    Murphy Willams is a 73 y.o. male who was admitted to [facility] after presenting with cough and developing rash after completing Augmentin antibiotic course. A renal consultation is requested today for assistance in the management of hyperkalemia, acute on chronic hyponatremia.  Patient with history of CLL, anemia, recently treated with Augmentin for pneumonia as an outpatient, who presents to the hospital with cough and rash, also found to have worsening anemia, hematology oncology, cardiology and pulmonology were consulted.  Patient received blood transfusion.  He was started empirically on a steroid tapering dose due to initial concern for autoimmune hemolytic anemia.  This morning showed hyperkalemia, received medical treatment, nephrology was consulted for further recommendation.  During my evaluation patient reported feeling very anxious and upset, currently denies any chest pain, continue with some shortness of breath with exertion, denies any abdominal pain, reports no constipation, no nausea, no vomiting, no diarrhea, no urinary problems    PAST MEDICAL HISTORY:  Past Medical History:   Diagnosis Date    Anxiety     BPH (benign prostatic hypertrophy)     Cancer (HCC)     CLL    CLL (chronic lymphocytic leukemia) (HCC)     2009    Colon polyp     Concussion     Resolved: 08/22/16    COVID-19 12/29/2023    Depression     Diverticulitis 01/13/2020 2014 CT done 11/19 12/19 CT done     E coli bacteremia 06/27/2021 2/2 blood cultures with Ecoli  Source appears to  be the urine     Epididymitis 05/19/2021 5/2021    Gastrointestinal hemorrhage     Last assessed: 08/27/13    GERD (gastroesophageal reflux disease)     H/O vitamin D deficiency 07/07/2021    Hearing loss of aging     Hiatal hernia     History of right hip replacement 04/19/2021    History of transfusion     Hyperlipidemia     Hypertension     Hyponatremia 03/06/2023    Iron deficiency anemia     Microscopic hematuria     Last assessed: 06/28/13    OA (osteoarthritis)     right hip    Obesity (BMI 30.0-34.9) 04/07/2022    Pneumothorax     Primary osteoarthritis of right hip 09/29/2017    He is status post right hip arthroplasty    Prostatitis     Pulmonary hypertension ([facility])     QT prolongation     Seasonal allergies     Sepsis ([facility]) 03/06/2023    Unresponsive episode 03/07/2023    Urinary tract infection associated with catheterization of urinary tract  ([facility]) 02/05/2021    Pseudomonal UTI asymptomatic.  Per Urology do not treat at this time.    Urinary tract infection associated with catheterization of urinary tract  ([facility]) 02/05/2021    Pseudomonal UTI asymptomatic.  Per Urology do not treat at this time.  He did have urosepsis from E coli 7/21    UTI (urinary tract infection)     in past    Ventricular fibrillation ([facility]) 03/06/2023    Noted after unresponsive episode      Wears glasses        PAST SURGICAL HISTORY:  Past Surgical History:   Procedure Laterality Date    ADENOIDECTOMY      BLADDER SURGERY      CARDIAC CATHETERIZATION Left 03/07/2023    Procedure: Cardiac Left Heart Cath;  Surgeon: Rich Ramirez MD;  Location: [facility];  Service: Cardiology    CARDIAC CATHETERIZATION N/A 03/07/2023    Procedure: Cardiac temporary pacemaker;  Surgeon: Rich Ramirez MD;  Location: [facility];  Service: Cardiology    CARDIAC ELECTROPHYSIOLOGY PROCEDURE N/A 03/08/2023    Procedure: Cardiac icd implant;  Surgeon: Filiberto Olsen MD;  [facility]: [facility];  Service: Cardiology    COLONOSCOPY       "CYSTOSCOPY  10/09/2014    Diagnostic    CYSTOSCOPY  2020    FL CYSTOGRAM  08/15/2022    FRACTURE SURGERY      left lower arm    FRACTURE SURGERY      left femur    HERNIA REPAIR      JOINT REPLACEMENT Right     hip    OTHER SURGICAL HISTORY  2011    Spinal anesthesia epidural    TX ARTHRP ACETBLR/PROX FEM PROSTC AGRFT/ALGRFT Right 2017    Procedure: ARTHROPLASTY HIP TOTAL ANTERIOR;  Surgeon: Roly Liu MD;  Location: [facility];  Service: Orthopedics    TONSILLECTOMY AND ADENOIDECTOMY      TRANSURETHRAL RESECTION OF PROSTATE      x 2    WRIST SURGERY         SOCIAL HISTORY:  Social History     Substance and Sexual Activity   Alcohol Use Yes    Alcohol/week: 10.0 standard drinks of alcohol    Types: 10 Cans of beer per week    Comment: socially     Social History     Substance and Sexual Activity   Drug Use Not Currently    Types: Marijuana    Comment: \"medical marijuana\"     Social History     Tobacco Use   Smoking Status Former    Current packs/day: 0.00    Types: Cigarettes    Quit date: 1980    Years since quittin.0    Passive exposure: Past   Smokeless Tobacco Never       FAMILY HISTORY:  Family History   Problem Relation Age of Onset    No Known Problems Mother     Heart attack Father     Diabetes Brother     Prostate cancer Brother        ALLERGIES:  Allergies   Allergen Reactions    Ciprofloxacin Other (See Comments)     LONG QT syndrome    Codeine Other (See Comments)     agitated    Sulfamethoxazole-Trimethoprim GI Intolerance, Abdominal Pain and Other (See Comments)     upset stomach    Augmentin [Amoxicillin-Pot Clavulanate] Rash     Livedo reticularis    Macrobid [Nitrofurantoin] Dizziness, Headache and Fatigue     Per Patient       MEDICATIONS:    Current Facility-Administered Medications:     acetaminophen (TYLENOL) tablet 650 mg, 650 mg, Oral, Q6H PRN, Cyril Nick MD, 650 mg at 24 3390    albuterol (PROVENTIL HFA,VENTOLIN HFA) inhaler 2 puff, 2 puff, Inhalation, " Q4H PRN, Cyril Nick MD    albuterol inhalation solution 10 mg, 10 mg, Nebulization, Once, Fritz Cross PA-C    aluminum-magnesium hydroxide-simethicone (MAALOX) oral suspension 30 mL, 30 mL, Oral, Q6H PRN, Cyril Nick MD, 30 mL at 09/03/24 1924    ascorbic acid (VITAMIN C) tablet 250 mg, 250 mg, Oral, Daily, Imelda Reno PA-C, 250 mg at 09/06/24 0837    atorvastatin (LIPITOR) tablet 20 mg, 20 mg, Oral, Daily With Dinner, Cyril Nick MD, 20 mg at 09/05/24 1754    benzonatate (TESSALON PERLES) capsule 100 mg, 100 mg, Oral, TID PRN, Cyril Nick MD, 100 mg at 09/05/24 0941    cyanocobalamin (VITAMIN B-12) tablet 500 mcg, 500 mcg, Oral, Daily, Imelda Reno PA-C, 500 mcg at 09/06/24 0837    fish oil capsule 1,000 mg, 1,000 mg, Oral, Daily, Imelda Reno PA-C, 1,000 mg at 09/06/24 0837    guaiFENesin (MUCINEX) 12 hr tablet 600 mg, 600 mg, Oral, BID, Cyril Nick MD, 600 mg at 09/06/24 0837    lidocaine (LIDODERM) 5 % patch 1 patch, 1 patch, Topical, Daily, Fritz Cross PA-C, 1 patch at 09/06/24 0840    LORazepam (ATIVAN) tablet 0.5 mg, 0.5 mg, Oral, Q8H PRN, Titus Portillo DO, 0.5 mg at 09/06/24 0837    methylPREDNISolone sodium succinate (Solu-MEDROL) injection 40 mg, 40 mg, Intravenous, Q12H CHET, Evelia Florez MD, 40 mg at 09/06/24 0837    montelukast (SINGULAIR) tablet 10 mg, 10 mg, Oral, Daily, Cyril Nick MD, 10 mg at 09/06/24 0837    nadolol (CORGARD) tablet 80 mg, 80 mg, Oral, Early Morning, Titus Portillo DO, 80 mg at 09/06/24 0624    pantoprazole (PROTONIX) EC tablet 40 mg, 40 mg, Oral, Early Morning, Cyril Nick MD, 40 mg at 09/06/24 0624    [START ON 9/7/2024] predniSONE tablet 60 mg, 60 mg, Oral, Daily **FOLLOWED BY** [START ON 9/14/2024] predniSONE tablet 40 mg, 40 mg, Oral, Daily **FOLLOWED BY** [START ON 9/21/2024] predniSONE tablet 20 mg, 20 mg, Oral, Daily **FOLLOWED BY** [START ON 9/28/2024] predniSONE tablet 10 mg, 10 mg, Oral, Daily,  Imelda Reno PA-C    saccharomyces boulardii (FLORASTOR) capsule 250 mg, 250 mg, Oral, BID, Imelda Reno PA-C, 250 mg at 09/06/24 0837    trimethobenzamide (TIGAN) IM injection 200 mg, 200 mg, Intramuscular, Q6H PRN, Cyril Nick MD, 200 mg at 09/02/24 1806    zolpidem (AMBIEN) tablet 5 mg, 5 mg, Oral, HS, Cyril Nick MD, 5 mg at 09/05/24 2206    REVIEW OF SYSTEMS:  All the systems were reviewed and were negative except as documented on the HPI.    PHYSICAL EXAM:  Current Weight: Weight - Scale: 86.6 kg (190 lb 14.7 oz)  First Weight: Weight - Scale: 86.6 kg (190 lb 14.7 oz)  Vitals:    09/05/24 2051 09/06/24 0624 09/06/24 0625 09/06/24 0714   BP: 123/72 142/79 142/79 143/76   BP Location: Left arm      Pulse: 77 76 70 70   Resp: 18   18   Temp: 98 °F (36.7 °C)   98 °F (36.7 °C)   TempSrc: Oral      SpO2: 94%  97% 97%   Weight:       Height:           Intake/Output Summary (Last 24 hours) at 9/6/2024 0942  Last data filed at 9/6/2024 0801  Gross per 24 hour   Intake 1351.25 ml   Output 1250 ml   Net 101.25 ml       General: conscious, cooperative, in not acute distress  Eyes: conjunctivae pink, anicteric sclerae  ENT: lips and mucous membranes moist, oxygen via nasal cannula  Neck: supple, no JVD  Chest: clear breath sounds bilateral, no crackles, ronchus or wheezings  CVS: distinct S1 & S2, normal rate, regular rhythm  Abdomen: soft, non-tender, non-distended, normoactive bowel sounds  Extremities: no significant edema of both legs  Skin: bilateral lower extremity rash  Neuro: awake, alert, oriented      Invasive Devices:        Lab Results:   Results from last 7 days   Lab Units 09/06/24  0617 09/06/24  0455 09/05/24  0400 09/04/24  0438 09/03/24  1007 09/03/24  0425   WBC Thousand/uL  --   --  125.54* 128.05* 125.72*  --    HEMOGLOBIN g/dL  --   --  6.3* 6.4* 5.7*  --    HEMATOCRIT %  --   --  19.1* 18.9* 16.4*  --    PLATELETS Thousands/uL  --   --  265 312 304  --    SODIUM mmol/L  --  127*  "128* 132* 128* 125*   POTASSIUM mmol/L 6.3* 6.6* 5.2 4.9  --  4.6   CHLORIDE mmol/L  --  99 98 100  --  96   CO2 mmol/L  --  26 23 24  --  21   BUN mg/dL  --  21 17 20  --  17   CREATININE mg/dL  --  0.75 0.77 0.87  --  0.84   CALCIUM mg/dL  --  8.4 8.4 8.5  --  8.4   ALK PHOS U/L  --  79 92  --   --  80   ALT U/L  --  51 27  --   --  20   AST U/L  --  37 25  --   --  29             Portions of the record may have been created with voice recognition software. Occasional wrong word or \"sound a like\" substitutions may have occurred due to the inherent limitations of voice recognition software. Read the chart carefully and recognize, using context, where substitutions have occurred.If you have any questions, please contact the dictating provider.  "

## 2024-09-06 NOTE — TELEPHONE ENCOUNTER
Pt called in frustrated with his care at the hospital. He stated he has seen every team besides oncology. He stated he is to have an appt with Dr. Hanley on 9/17 but he would really like to see someone from the team to go over his results. He did mentioned his frustration of so many things occurring with with his health and he is concerned that he doesn't have the right team really looking in to his blood results since being admitted. He also wanted to know is there an oncologist in the hospital or PA there which I did not have that information. Pt can be reached at [phone]. Thank you.

## 2024-09-06 NOTE — PLAN OF CARE
Problem: PAIN - ADULT  Goal: Verbalizes/displays adequate comfort level or baseline comfort level  Description: Interventions:  - Encourage patient to monitor pain and request assistance  - Assess pain using appropriate pain scale  - Administer analgesics based on type and severity of pain and evaluate response  - Implement non-pharmacological measures as appropriate and evaluate response  - Consider cultural and social influences on pain and pain management  - Notify physician/advanced practitioner if interventions unsuccessful or patient reports new pain  Outcome: Progressing     Problem: INFECTION - ADULT  Goal: Absence or prevention of progression during hospitalization  Description: INTERVENTIONS:  - Assess and monitor for signs and symptoms of infection  - Monitor lab/diagnostic results  - Monitor all insertion sites, i.e. indwelling lines, tubes, and drains  - Monitor endotracheal if appropriate and nasal secretions for changes in amount and color  - [place] appropriate cooling/warming therapies per order  - Administer medications as ordered  - Instruct and encourage patient and family to use good hand hygiene technique  - Identify and instruct in appropriate isolation precautions for identified infection/condition  Outcome: Progressing  Goal: Absence of fever/infection during neutropenic period  Description: INTERVENTIONS:  - Monitor WBC    Outcome: Progressing     Problem: SAFETY ADULT  Goal: Patient will remain free of falls  Description: INTERVENTIONS:  - Educate patient/family on patient safety including physical limitations  - Instruct patient to call for assistance with activity   - Consult OT/PT to assist with strengthening/mobility   - Keep Call bell within reach  - Keep bed low and locked with side rails adjusted as appropriate  - Keep care items and personal belongings within reach  - Initiate and maintain comfort rounds  - Make Fall Risk Sign visible to staff  - Offer Toileting every 2 Hours,  in advance of need  - Initiate/Maintain bed alarm  - Obtain necessary fall risk management equipment: bed alarm  - Apply yellow socks and bracelet for high fall risk patients  - Consider moving patient to room near nurses station  Outcome: Progressing  Goal: Maintain or return to baseline ADL function  Description: INTERVENTIONS:  -  Assess patient's ability to carry out ADLs; assess patient's baseline for ADL function and identify physical deficits which impact ability to perform ADLs (bathing, care of mouth/teeth, toileting, grooming, dressing, etc.)  - Assess/evaluate cause of self-care deficits   - Assess range of motion  - Assess patient's mobility; develop plan if impaired  - Assess patient's need for assistive devices and provide as appropriate  - Encourage maximum independence but intervene and supervise when necessary  - Involve family in performance of ADLs  - Assess for home care needs following discharge   - Consider OT consult to assist with ADL evaluation and planning for discharge  - Provide patient education as appropriate  Outcome: Progressing  Goal: Maintains/Returns to pre admission functional level  Description: INTERVENTIONS:  - Perform AM-PAC 6 Click Basic Mobility/ Daily Activity assessment daily.  - Set and communicate daily mobility goal to care team and patient/family/caregiver.   - Collaborate with rehabilitation services on mobility goals if consulted  - Perform Range of Motion 3 times a day.  - Reposition patient every 2 hours.  - Dangle patient 3 times a day  - Stand patient 3 times a day  - Ambulate patient 3 times a day  - Out of bed to chair 3 times a day   - Out of bed for meals 3 times a day  - Out of bed for toileting  - Record patient progress and toleration of activity level   Outcome: Progressing     Problem: DISCHARGE PLANNING  Goal: Discharge to home or other facility with appropriate resources  Description: INTERVENTIONS:  - Identify barriers to discharge w/patient and  caregiver  - Arrange for needed discharge resources and transportation as appropriate  - Identify discharge learning needs (meds, wound care, etc.)  - Arrange for interpretive services to assist at discharge as needed  - Refer to Case Management Department for coordinating discharge planning if the patient needs post-hospital services based on physician/advanced practitioner order or complex needs related to functional status, cognitive ability, or social support system  Outcome: Progressing     Problem: Knowledge Deficit  Goal: Patient/family/caregiver demonstrates understanding of disease process, treatment plan, medications, and discharge instructions  Description: Complete learning assessment and assess knowledge base.  Interventions:  - Provide teaching at level of understanding  - Provide teaching via preferred learning methods  Outcome: Progressing

## 2024-09-06 NOTE — QUICK NOTE
Patient with K 6.6 this morning.  STAT repeat K ordered  EKG demonstrating no acute changes, QT continues to be prolonged at 581 this AM, but similar to previous days    PLAN  Monitor on telemetry  Calcium gluconate, insulin/dextrose and albuterol ordered

## 2024-09-06 NOTE — TELEPHONE ENCOUNTER
Patient would like to be seen in person inpatient by someone from the oncology team to discuss his labs, including hemoglobin and potassium and other concerning values. He feels as though the oncology team should be addressing his concerns and that the providers that have been seeing him are not fully aware of his case.     Advised patient that Dr. Gannon saw him in the hospital on 9/3 and called him to follow-up on 9/4. Patient states Dr. Gannon never saw him inpatient, only called him. Advised patient to request from his primary inpatient RN that she place a new order for the oncology team to consult and see him while inpatient. Patient requested that I explain this to her.    I spoke to Joanne, primary RN for patient at this time and requested that she place a new order to consult oncology so the patient may be seen in person while in the hospital. RN agreed.    Patient verbalized satisfaction with plan.

## 2024-09-06 NOTE — PLAN OF CARE
Problem: PAIN - ADULT  Goal: Verbalizes/displays adequate comfort level or baseline comfort level  Description: Interventions:  - Encourage patient to monitor pain and request assistance  - Assess pain using appropriate pain scale  - Administer analgesics based on type and severity of pain and evaluate response  - Implement non-pharmacological measures as appropriate and evaluate response  - Consider cultural and social influences on pain and pain management  - Notify physician/advanced practitioner if interventions unsuccessful or patient reports new pain  Outcome: Progressing     Problem: INFECTION - ADULT  Goal: Absence or prevention of progression during hospitalization  Description: INTERVENTIONS:  - Assess and monitor for signs and symptoms of infection  - Monitor lab/diagnostic results  - Monitor all insertion sites, i.e. indwelling lines, tubes, and drains  - Monitor endotracheal if appropriate and nasal secretions for changes in amount and color  - [place] appropriate cooling/warming therapies per order  - Administer medications as ordered  - Instruct and encourage patient and family to use good hand hygiene technique  - Identify and instruct in appropriate isolation precautions for identified infection/condition  Outcome: Progressing  Goal: Absence of fever/infection during neutropenic period  Description: INTERVENTIONS:  - Monitor WBC    Outcome: Progressing     Problem: SAFETY ADULT  Goal: Patient will remain free of falls  Description: INTERVENTIONS:  - Educate patient/family on patient safety including physical limitations  - Instruct patient to call for assistance with activity   - Consult OT/PT to assist with strengthening/mobility   - Keep Call bell within reach  - Keep bed low and locked with side rails adjusted as appropriate  - Keep care items and personal belongings within reach  - Initiate and maintain comfort rounds  - Make Fall Risk Sign visible to staff  - Offer Toileting every 2 Hours,  in advance of need  - Initiate/Maintain bed alarm  - Obtain necessary fall risk management equipment:   - Apply yellow socks and bracelet for high fall risk patients  - Consider moving patient to room near nurses station  Outcome: Progressing  Goal: Maintain or return to baseline ADL function  Description: INTERVENTIONS:  -  Assess patient's ability to carry out ADLs; assess patient's baseline for ADL function and identify physical deficits which impact ability to perform ADLs (bathing, care of mouth/teeth, toileting, grooming, dressing, etc.)  - Assess/evaluate cause of self-care deficits   - Assess range of motion  - Assess patient's mobility; develop plan if impaired  - Assess patient's need for assistive devices and provide as appropriate  - Encourage maximum independence but intervene and supervise when necessary  - Involve family in performance of ADLs  - Assess for home care needs following discharge   - Consider OT consult to assist with ADL evaluation and planning for discharge  - Provide patient education as appropriate  Outcome: Progressing  Goal: Maintains/Returns to pre admission functional level  Description: INTERVENTIONS:  - Perform AM-PAC 6 Click Basic Mobility/ Daily Activity assessment daily.  - Set and communicate daily mobility goal to care team and patient/family/caregiver.   - Collaborate with rehabilitation services on mobility goals if consulted  - Perform Range of Motion 4 times a day.  - Reposition patient every 2 hours.  - Dangle patient 3 times a day  - Stand patient 3 times a day  - Ambulate patient 3 times a day  - Out of bed to chair 3 times a day   - Out of bed for meals 3 times a day  - Out of bed for toileting  - Record patient progress and toleration of activity level   Outcome: Progressing     Problem: DISCHARGE PLANNING  Goal: Discharge to home or other facility with appropriate resources  Description: INTERVENTIONS:  - Identify barriers to discharge w/patient and caregiver  -  Arrange for needed discharge resources and transportation as appropriate  - Identify discharge learning needs (meds, wound care, etc.)  - Arrange for interpretive services to assist at discharge as needed  - Refer to Case Management Department for coordinating discharge planning if the patient needs post-hospital services based on physician/advanced practitioner order or complex needs related to functional status, cognitive ability, or social support system  Outcome: Progressing     Problem: Knowledge Deficit  Goal: Patient/family/caregiver demonstrates understanding of disease process, treatment plan, medications, and discharge instructions  Description: Complete learning assessment and assess knowledge base.  Interventions:  - Provide teaching at level of understanding  - Provide teaching via preferred learning methods  Outcome: Progressing

## 2024-09-06 NOTE — ASSESSMENT & PLAN NOTE
Acute development of hyperkalemia on morning of 9/6 at 6.6  Has since been treated with insulin/dextrose/calcium/bicarb x 2    Continue on Lokelma 10 mg daily  Continue on p.o. Lasix 20 mg daily  Holding home losartan and spironolactone  Low potassium diet  Appreciate nephrology recommendations  Monitor daily BMP

## 2024-09-06 NOTE — PROGRESS NOTES
[facility]  Progress Note  Name: Murphy Willams I  MRN: [id number]  Unit/Bed#: [place] [address] -01 I Date of Admission: 9/2/2024   Date of Service: 9/6/2024 I Hospital Day: 4    Assessment & Plan   * Anemia  Assessment & Plan  Patient presented to the hospital with worsening cough and shortness of breath.  He is recently treated for pneumonia.  In addition was found to have acute anemia with at 8.7 on admission, and 5.7 the following day.  Has a distant history of autoimmune hemolytic anemia    Appreciate hematology/oncology recommendations  hemolysis workup generally unremarkable despite positive Narciso test  Recommending treating empirically with IV steroids followed by prednisone taper given his history  Transfused total of 2 units PRBCs this hospitalization and hemoglobin now at 7.3 g  No signs or symptoms to suggest bleeding; will restart Pradaxa  Continue to monitor and transfuse as necessary her for hemoglobin less than 7.0 g  Will need close outpatient follow-up with hematology/oncology; currently follows with Dr. Hanley and has an appointment scheduled for 2 weeks from now    Hyperkalemia  Assessment & Plan  Acute development of hyperkalemia on morning of 9/6 at 6.6  Received insulin, dextrose, calcium and Lokelma x 1  Repeat potassium this afternoon 5.6    Will repeat insulin and dextrose, calcium, bicarb  Start Lokelma daily tomorrow  Holding home losartan and spironolactone  Started on p.o. Lasix 20 mg daily  Will monitor on telemetry    CLL (chronic lymphocytic leukemia) (HCC)  Assessment & Plan  Currently follow with Dr. Hanley, not on any current medications  WBC has been significantly elevated as high as 138,000 this hospitalization  Could be reactive in the setting of recent pneumonia, however cannot rule out progression of CLL    Will continue to monitor while inpatient but will need close outpatient follow-up with Dr. Hanley.  Outpatient appointment is already  scheduled for 2 weeks from now    Rash  Assessment & Plan  Developed a rash shortly after completing Augmentin therapy  Question autoimmune component given history of CLL with autoimmune hemolytic anemia  Continue to monitor with supportive care time  Monitor with improvement in hemoglobin and steroids    Pneumonia  Assessment & Plan  73-year-old male with PMH of CLL, A-fib, and HTN presented with cough  Completed full course of Augmentin as an outpatient  Seen by pulmonology while inpatient who did not recommend any ongoing antibiotic therapy  Relatively asymptomatic other than a lingering cough  Stable on room air  Continue to monitor clinically    Vitamin B 12 deficiency  Assessment & Plan  Restart B12 supplement    Long QT syndrome type 2  Assessment & Plan  Avoid QT prolonging agents  Continue home nadolol    Paroxysmal A-fib (HCC)  Assessment & Plan  Continue nadolol   CTA showing lack of opacification of the left atrial appendage, cannot r/o thrombus  Repeat echocardiogram again atrial scanning performed: No evidence of thrombus  Will restart Pradaxa    Hyponatremia  Assessment & Plan  Acute on chronic  Should improve with p.o. Lasix  Recheck in the morning  Appreciate nephrology recommendations    Pulmonary hypertension (HCC)  Assessment & Plan  Severe pulmonary hypertension and tricuspid regurgitation noted on echocardiogram  Cardiology following    Essential hypertension  Assessment & Plan  BP controlled  Home meds nadolol, losartan and aldactone  Hold Aldactone for now given hyponatremia           VTE Pharmacologic Prophylaxis:   Moderate Risk (Score 3-4) - Pharmacological DVT Prophylaxis Contraindicated. Sequential Compression Devices Ordered.    Mobility:   Basic Mobility Inpatient Raw Score: 18  JH-HLM Goal: 6: Walk 10 steps or more  JH-HLM Achieved: 6: Walk 10 steps or more  JH-HLM Goal achieved. Continue to encourage appropriate mobility.    Patient Centered Rounds: I performed bedside rounds with  nursing staff today.   Discussions with Specialists or Other Care Team Provider: BROOKS  Nephrology.    Education and Discussions with Family / Patient: Updated  (wife) at bedside.    Total Time Spent on Date of Encounter in care of patient: 45 mins. This time was spent on one or more of the following: performing physical exam; counseling and coordination of care; obtaining or reviewing history; documenting in the medical record; reviewing/ordering tests, medications or procedures; communicating with other healthcare professionals and discussing with patient's family/caregivers.    Current Length of Stay: 4 day(s)  Current Patient Status: Inpatient   Certification Statement: The patient will continue to require additional inpatient hospital stay due to blood transfusion, serial lab monitoring, medication titration, dispo planning  Discharge Plan: Anticipate discharge in 24-48 hrs to home.    Code Status: Level 1 - Full Code    Subjective:   Patient seen and examined.  Doing okay today though had significant hyperkalemia on a.m. labs.  Still working on correcting this.  Between the high potassium, high WBC, and low hemoglobin patient is quite anxious.  He is afebrile.  Stable on room air.    Objective:     Vitals:   Temp (24hrs), Av °F (36.7 °C), Min:97.8 °F (36.6 °C), Max:98.2 °F (36.8 °C)    Temp:  [97.8 °F (36.6 °C)-98.2 °F (36.8 °C)] 98.2 °F (36.8 °C)  HR:  [69-79] 70  Resp:  [16-18] 18  BP: (115-145)/(56-93) 136/77  SpO2:  [94 %-98 %] 98 %  Body mass index is 29.03 kg/m².     Input and Output Summary (last 24 hours):     Intake/Output Summary (Last 24 hours) at 2024 1411  Last data filed at 2024 1300  Gross per 24 hour   Intake 1291.25 ml   Output 1250 ml   Net 41.25 ml       PHYSICAL EXAM:    Vitals signs reviewed  Constitutional   Awake and cooperative. NAD.   Head/Neck   Normocephalic. Atraumatic.   HEENT   No scleral icterus. EOMI.   Heart   Regular rate and rhythm. No murmers.   Lungs    Clear to auscultation bilaterally. Respirations unlaboured.   Abdomen   Soft. Nontender. Nondistended.    Skin   Skin color normal.    Extremities   No deformities. No peripheral edema.  Unchanged lacy appearing rash on his bilateral lower extremities   Neuro   Alert and oriented. No new deficits.   Psych   Mood stable. Affect normal.       Additional Data:     Labs:  Results from last 7 days   Lab Units 09/06/24  0455 09/04/24  0438 09/03/24  1007 09/02/24  0733   WBC Thousand/uL 138.64*   < > 125.72* 113.22*   HEMOGLOBIN g/dL 7.3*   < > 5.7* 8.7*   HEMATOCRIT % 22.6*   < > 16.4* 24.1*   PLATELETS Thousands/uL 255   < > 304 347   BANDS PCT %  --   --   --  2   SEGS PCT %  --   --  6*  --    LYMPHO PCT % 83*  --  93* 78*   MONO PCT % 1*  --  1* 0*   EOS PCT % 0  --  0 0    < > = values in this interval not displayed.     Results from last 7 days   Lab Units 09/06/24  1218 09/06/24  0617 09/06/24  0455   SODIUM mmol/L 128*  --  127*   POTASSIUM mmol/L 5.6*   < > 6.6*   CHLORIDE mmol/L 98  --  99   CO2 mmol/L 26  --  26   BUN mg/dL 24  --  21   CREATININE mg/dL 0.78  --  0.75   ANION GAP mmol/L 4  --  2*   CALCIUM mg/dL 8.8  --  8.4   ALBUMIN g/dL  --   --  3.8   TOTAL BILIRUBIN mg/dL  --   --  1.22*   ALK PHOS U/L  --   --  79   ALT U/L  --   --  51   AST U/L  --   --  37   GLUCOSE RANDOM mg/dL 136  --  153*    < > = values in this interval not displayed.         Results from last 7 days   Lab Units 09/06/24  0620   POC GLUCOSE mg/dl 153*         Results from last 7 days   Lab Units 09/03/24  0425 09/02/24  0733   PROCALCITONIN ng/ml 0.19 0.06       Lines/Drains:  Invasive Devices       Peripheral Intravenous Line  Duration             Peripheral IV 09/04/24 Distal;Right;Upper;Ventral (anterior) Arm 2 days                          Imaging: Reviewed radiology reports from this admission including: ultrasound(s) and ECHO    Recent Cultures (last 7 days):   Results from last 7 days   Lab Units 09/02/24  9839  09/02/24  1259   SPUTUM CULTURE  2+ Growth of  --    GRAM STAIN RESULT  1+ Epithelial cells per low power field*  2+ Gram positive cocci in pairs*  2+ Gram variable rods*  No polys seen*  --    LEGIONELLA URINARY ANTIGEN   --  Negative       Last 24 Hours Medication List:   Current Facility-Administered Medications   Medication Dose Route Frequency Provider Last Rate    acetaminophen  650 mg Oral Q6H PRN Cyril Nick MD      albuterol  2 puff Inhalation Q4H PRN Cyril Nick MD      aluminum-magnesium hydroxide-simethicone  30 mL Oral Q6H PRN Cyril Nick MD      ascorbic acid  250 mg Oral Daily GIAN RenteriaC      atorvastatin  20 mg Oral Daily With Dinner Cyril Nick MD      benzonatate  100 mg Oral TID PRN Cyril Nick MD      calcium gluconate  1 g Intravenous Once Titus Portillo, DO      vitamin B-12  500 mcg Oral Daily GIAN RenteriaC      dabigatran etexilate  150 mg Oral Q12H [facility] Titus Portillo, DO      dextrose  25 mL Intravenous Once Titus Portillo, DO      fish oil  1,000 mg Oral Daily Imelda Reno PA-C      furosemide  20 mg Oral Daily Titus Portillo, DO      guaiFENesin  600 mg Oral BID Cyril Nick MD      insulin regular (HumuLIN R,NovoLIN R) 10 Units in sodium chloride 0.9 % 3 mL IV syringe  10 Units Intravenous Once Titus Portillo, DO      lidocaine  1 patch Topical Daily GIAN VidalC      LORazepam  0.5 mg Oral Q8H PRN Titus Portillo DO      methylPREDNISolone sodium succinate  40 mg Intravenous Q12H [facility] Evelia Florez MD      montelukast  10 mg Oral Daily Cyril Nick MD      nadolol  80 mg Oral Early Morning Titus Portillo DO      pantoprazole  40 mg Oral Early Morning Cyril Nick MD      [START ON 9/7/2024] predniSONE  60 mg Oral Daily Imelda Reno PA-C      Followed by    [START ON 9/14/2024] predniSONE  40 mg Oral Daily Imelda Reno PA-C      Followed by    [START ON  9/21/2024] predniSONE  20 mg Oral Daily Imelda Reno PA-C      Followed by    [START ON 9/28/2024] predniSONE  10 mg Oral Daily Imelda Reno PA-C      saccharomyces boulardii  250 mg Oral BID Imelda Rneo PA-C      sodium bicarbonate  50 mEq Intravenous Once Titus Portillo DO      [START ON 9/7/2024] Sodium Zirconium Cyclosilicate  10 g Oral Daily Titus Portillo DO      trimethobenzamide  200 mg Intramuscular Q6H PRN Cyril Nick MD      zolpidem  5 mg Oral HS Cyril Nick MD          Today, Patient Was Seen By: Titus Portillo DO    **Please Note: This note may have been constructed using a voice recognition system.**

## 2024-09-07 LAB
ABO GROUP BLD: NORMAL
ANION GAP SERPL CALCULATED.3IONS-SCNC: 4 MMOL/L (ref 4–13)
BASOPHILS # BLD MANUAL: 0 THOUSAND/UL (ref 0–0.1)
BASOPHILS NFR MAR MANUAL: 0 % (ref 0–1)
BLD GP AB SCN SERPL QL: POSITIVE
BUN SERPL-MCNC: 24 MG/DL (ref 5–25)
CALCIUM SERPL-MCNC: 8.7 MG/DL (ref 8.4–10.2)
CHLORIDE SERPL-SCNC: 98 MMOL/L (ref 96–108)
CO2 SERPL-SCNC: 28 MMOL/L (ref 21–32)
CREAT SERPL-MCNC: 0.86 MG/DL (ref 0.6–1.3)
EOSINOPHIL # BLD MANUAL: 0 THOUSAND/UL (ref 0–0.4)
EOSINOPHIL NFR BLD MANUAL: 0 % (ref 0–6)
GFR SERPL CREATININE-BSD FRML MDRD: 87 ML/MIN/1.73SQ M
GLUCOSE SERPL-MCNC: 136 MG/DL (ref 65–140)
HCT VFR BLD AUTO: 22 % (ref 36.5–49.3)
HGB BLD-MCNC: 6.5 G/DL (ref 12–17)
LYMPHOCYTES # BLD AUTO: 157.78 THOUSAND/UL (ref 0.6–4.47)
LYMPHOCYTES # BLD AUTO: 90 % (ref 14–44)
MACROCYTES BLD QL AUTO: PRESENT
MCH RBC QN AUTO: 33.3 PG (ref 26.8–34.3)
MCHC RBC AUTO-ENTMCNC: 29.5 G/DL (ref 31.4–37.4)
MCV RBC AUTO: 113 FL (ref 82–98)
MONOCYTES # BLD AUTO: 1.72 THOUSAND/UL (ref 0–1.22)
MONOCYTES NFR BLD: 1 % (ref 4–12)
NEUTROPHILS # BLD MANUAL: 12.01 THOUSAND/UL (ref 1.85–7.62)
NEUTS SEG NFR BLD AUTO: 7 % (ref 43–75)
NRBC BLD AUTO-RTO: 1 /100 WBC (ref 0–2)
OVALOCYTES BLD QL SMEAR: PRESENT
PLATELET # BLD AUTO: 205 THOUSANDS/UL (ref 149–390)
PLATELET BLD QL SMEAR: ADEQUATE
PMV BLD AUTO: 10.3 FL (ref 8.9–12.7)
POLYCHROMASIA BLD QL SMEAR: PRESENT
POTASSIUM SERPL-SCNC: 5.2 MMOL/L (ref 3.5–5.3)
RBC # BLD AUTO: 1.95 MILLION/UL (ref 3.88–5.62)
RBC MORPH BLD: PRESENT
RH BLD: POSITIVE
SMUDGE CELLS BLD QL SMEAR: PRESENT
SODIUM SERPL-SCNC: 130 MMOL/L (ref 135–147)
SPECIMEN EXPIRATION DATE: NORMAL
VARIANT LYMPHS # BLD AUTO: 2 %
WBC # BLD AUTO: 171.5 THOUSAND/UL (ref 4.31–10.16)

## 2024-09-07 PROCEDURE — 99232 SBSQ HOSP IP/OBS MODERATE 35: CPT | Performed by: INTERNAL MEDICINE

## 2024-09-07 PROCEDURE — 86922 COMPATIBILITY TEST ANTIGLOB: CPT

## 2024-09-07 PROCEDURE — 86901 BLOOD TYPING SEROLOGIC RH(D): CPT | Performed by: INTERNAL MEDICINE

## 2024-09-07 PROCEDURE — 86850 RBC ANTIBODY SCREEN: CPT | Performed by: INTERNAL MEDICINE

## 2024-09-07 PROCEDURE — 99233 SBSQ HOSP IP/OBS HIGH 50: CPT | Performed by: INTERNAL MEDICINE

## 2024-09-07 PROCEDURE — 86921 COMPATIBILITY TEST INCUBATE: CPT

## 2024-09-07 PROCEDURE — 80048 BASIC METABOLIC PNL TOTAL CA: CPT | Performed by: INTERNAL MEDICINE

## 2024-09-07 PROCEDURE — 85007 BL SMEAR W/DIFF WBC COUNT: CPT | Performed by: INTERNAL MEDICINE

## 2024-09-07 PROCEDURE — 86900 BLOOD TYPING SEROLOGIC ABO: CPT | Performed by: INTERNAL MEDICINE

## 2024-09-07 PROCEDURE — P9040 RBC LEUKOREDUCED IRRADIATED: HCPCS

## 2024-09-07 PROCEDURE — 93005 ELECTROCARDIOGRAM TRACING: CPT

## 2024-09-07 PROCEDURE — 88185 FLOWCYTOMETRY/TC ADD-ON: CPT

## 2024-09-07 PROCEDURE — 85027 COMPLETE CBC AUTOMATED: CPT | Performed by: INTERNAL MEDICINE

## 2024-09-07 PROCEDURE — 88184 FLOWCYTOMETRY/ TC 1 MARKER: CPT | Performed by: INTERNAL MEDICINE

## 2024-09-07 RX ORDER — POLYETHYLENE GLYCOL 3350 17 G/17G
17 POWDER, FOR SOLUTION ORAL DAILY
Status: DISCONTINUED | OUTPATIENT
Start: 2024-09-08 | End: 2024-09-12 | Stop reason: HOSPADM

## 2024-09-07 RX ORDER — SENNOSIDES 8.6 MG
1 TABLET ORAL
Status: DISCONTINUED | OUTPATIENT
Start: 2024-09-07 | End: 2024-09-12 | Stop reason: HOSPADM

## 2024-09-07 RX ADMIN — ZOLPIDEM TARTRATE 5 MG: 5 TABLET, COATED ORAL at 23:00

## 2024-09-07 RX ADMIN — SENNOSIDES 8.6 MG: 8.6 TABLET, FILM COATED ORAL at 21:05

## 2024-09-07 RX ADMIN — ATORVASTATIN CALCIUM 20 MG: 20 TABLET, FILM COATED ORAL at 17:43

## 2024-09-07 RX ADMIN — DABIGATRAN ETEXILATE MESYLATE 150 MG: 150 CAPSULE ORAL at 21:05

## 2024-09-07 RX ADMIN — FUROSEMIDE 20 MG: 20 TABLET ORAL at 09:37

## 2024-09-07 RX ADMIN — DABIGATRAN ETEXILATE MESYLATE 150 MG: 150 CAPSULE ORAL at 09:30

## 2024-09-07 RX ADMIN — PANTOPRAZOLE SODIUM 40 MG: 40 TABLET, DELAYED RELEASE ORAL at 06:02

## 2024-09-07 RX ADMIN — NADOLOL 80 MG: 20 TABLET ORAL at 06:02

## 2024-09-07 RX ADMIN — LORAZEPAM 0.5 MG: 0.5 TABLET ORAL at 21:05

## 2024-09-07 RX ADMIN — GUAIFENESIN 600 MG: 600 TABLET, EXTENDED RELEASE ORAL at 21:05

## 2024-09-07 RX ADMIN — GUAIFENESIN 600 MG: 600 TABLET, EXTENDED RELEASE ORAL at 09:30

## 2024-09-07 RX ADMIN — LIDOCAINE 1 PATCH: 700 PATCH TOPICAL at 09:31

## 2024-09-07 RX ADMIN — CYANOCOBALAMIN TAB 500 MCG 500 MCG: 500 TAB at 09:30

## 2024-09-07 RX ADMIN — POLYETHYLENE GLYCOL 3350 17 G: 17 POWDER, FOR SOLUTION ORAL at 00:37

## 2024-09-07 RX ADMIN — OMEGA-3 FATTY ACIDS CAP 1000 MG 1000 MG: 1000 CAP at 09:30

## 2024-09-07 RX ADMIN — BENZONATATE 100 MG: 100 CAPSULE ORAL at 21:05

## 2024-09-07 RX ADMIN — POLYETHYLENE GLYCOL 3350 17 G: 17 POWDER, FOR SOLUTION ORAL at 21:17

## 2024-09-07 RX ADMIN — Medication 250 MG: at 09:30

## 2024-09-07 RX ADMIN — LORAZEPAM 0.5 MG: 0.5 TABLET ORAL at 14:04

## 2024-09-07 RX ADMIN — ZOLPIDEM TARTRATE 5 MG: 5 TABLET, COATED ORAL at 00:37

## 2024-09-07 RX ADMIN — SODIUM ZIRCONIUM CYCLOSILICATE 10 G: 10 POWDER, FOR SUSPENSION ORAL at 09:31

## 2024-09-07 RX ADMIN — PREDNISONE 60 MG: 20 TABLET ORAL at 09:30

## 2024-09-07 RX ADMIN — MONTELUKAST 10 MG: 10 TABLET, FILM COATED ORAL at 09:30

## 2024-09-07 RX ADMIN — Medication 250 MG: at 17:43

## 2024-09-07 RX ADMIN — Medication 250 MG: at 09:31

## 2024-09-07 NOTE — PROGRESS NOTES
[facility]  Progress Note  Name: Murphy Willams I  MRN: [id number]  Unit/Bed#: [place] [address] -01 I Date of Admission: 9/2/2024   Date of Service: 9/7/2024 I Hospital Day: 5    Assessment & Plan   * Anemia  Assessment & Plan  Patient presented to the hospital with worsening cough and shortness of breath.  He is recently treated for pneumonia.  In addition was found to have acute anemia with at 8.7 on admission, and 5.7 the following day.  Has a distant history of autoimmune hemolytic anemia    Appreciate hematology/oncology recommendations  hemolysis workup generally unremarkable despite positive Narciso test  Recommending treating empirically with IV steroids followed by prednisone taper given his history  Transfused total of 2 units PRBCs this hospitalization and hemoglobin now at 7.3 g  He has no signs or symptoms of significant bleeding and has been restarted on his home Pradaxa  9/7: Hemoglobin again 6.5.  Will transfuse an additional 1 unit PRBCs  Hematology to evaluate  Outpatient follow-up with hematology scheduled for after discharge already    Hyperkalemia  Assessment & Plan  Acute development of hyperkalemia on morning of 9/6 at 6.6  Has since been treated with insulin/dextrose/calcium/bicarb x 2    Continue on Lokelma 10 mg daily  Continue on p.o. Lasix 20 mg daily  Holding home losartan and spironolactone  Low potassium diet  Appreciate nephrology recommendations: Would plan to continue to hold losartan and spironolactone on discharge.  Continue Lokelma/Lasix.  They will follow-up as an outpatient with repeat blood work and outpatient follow-up.    CLL (chronic lymphocytic leukemia) (HCC)  Assessment & Plan  Currently follow with Dr. Hanley, not on any current medications  WBC has been significantly elevated as high as 138,000 this hospitalization  Could be reactive in the setting of recent pneumonia, however cannot rule out progression of CLL    WBC continues to rise and is  currently at 171,000  Hematology to evaluate today  Follow-up flow cytometry  Outpatient follow-up with Dr. Hanley    Rash  Assessment & Plan  Developed a rash shortly after completing Augmentin therapy  Question autoimmune component given history of CLL with autoimmune hemolytic anemia  Continue to monitor with supportive care time  Monitor with improvement in hemoglobin and steroids    Pneumonia  Assessment & Plan  73-year-old male with PMH of CLL, A-fib, and HTN presented with cough  Completed full course of Augmentin as an outpatient  Seen by pulmonology while inpatient who did not recommend any ongoing antibiotic therapy  Relatively asymptomatic other than a lingering cough  Stable on room air  Continue to monitor clinically    Vitamin B 12 deficiency  Assessment & Plan  Restart B12 supplement    Long QT syndrome type 2  Assessment & Plan  Avoid QT prolonging agents  Continue home nadolol    Paroxysmal A-fib (HCC)  Assessment & Plan  Continue nadolol   CTA showing lack of opacification of the left atrial appendage, cannot r/o thrombus  Repeat echocardiogram again atrial scanning performed: No evidence of thrombus  Will restart Pradaxa    Hyponatremia  Assessment & Plan  Acute on chronic  Improving with fluid restriction and p.o. Lasix  Continue to monitor daily BMP    Pulmonary hypertension (HCC)  Assessment & Plan  Severe pulmonary hypertension and tricuspid regurgitation noted on echocardiogram  Cardiology following    Essential hypertension  Assessment & Plan  BP controlled  Home meds nadolol, losartan and aldactone  Aldactone and losartan on hold given electrolyte issues           VTE Pharmacologic Prophylaxis:   Moderate Risk (Score 3-4) - Pharmacological DVT Prophylaxis Contraindicated. Sequential Compression Devices Ordered.    Mobility:   Basic Mobility Inpatient Raw Score: 18  JH-HLM Goal: 6: Walk 10 steps or more  JH-HLM Achieved: 7: Walk 25 feet or more  JH-HLM Goal achieved. Continue to encourage  appropriate mobility.    Patient Centered Rounds: I performed bedside rounds with nursing staff today.   Discussions with Specialists or Other Care Team Provider: BROOKS  Nephrology.    Education and Discussions with Family / Patient: Updated  (wife) at bedside.    Total Time Spent on Date of Encounter in care of patient: 52 mins. This time was spent on one or more of the following: performing physical exam; counseling and coordination of care; obtaining or reviewing history; documenting in the medical record; reviewing/ordering tests, medications or procedures; communicating with other healthcare professionals and discussing with patient's family/caregivers.    Current Length of Stay: 5 day(s)  Current Patient Status: Inpatient   Certification Statement: The patient will continue to require additional inpatient hospital stay due to blood transfusion, serial lab monitoring, medication titration, dispo planning  Discharge Plan: Anticipate discharge in 24-48 hrs to home.    Code Status: Level 1 - Full Code    Subjective:   Patient seen and examined.  Hemoglobin again low this morning.  Fortunately potassium has corrected.  Due to his ongoing issues patient is becoming more anxious.  He was visibly upset during my evaluation.     Objective:     Vitals:   Temp (24hrs), Av.8 °F (36.6 °C), Min:97.4 °F (36.3 °C), Max:98.2 °F (36.8 °C)    Temp:  [97.4 °F (36.3 °C)-98.2 °F (36.8 °C)] 97.4 °F (36.3 °C)  HR:  [70-76] 70  Resp:  [16-18] 16  BP: ()/(47-73) 132/67  SpO2:  [92 %-96 %] 95 %  Body mass index is 29.03 kg/m².     Input and Output Summary (last 24 hours):     Intake/Output Summary (Last 24 hours) at 2024 1501  Last data filed at 2024 1256  Gross per 24 hour   Intake 1440 ml   Output 600 ml   Net 840 ml       PHYSICAL EXAM:    Vitals signs reviewed  Constitutional   Awake and cooperative. NAD.   Head/Neck   Normocephalic. Atraumatic.   HEENT   No scleral icterus. EOMI.   Heart   Regular rate  and rhythm. No murmers.   Lungs   Clear to auscultation bilaterally. Respirations unlaboured.   Abdomen   Soft. Nontender. Nondistended.    Skin   Skin color normal.    Extremities   No deformities. No peripheral edema.  Unchanged lacy appearing rash on his bilateral lower extremities   Neuro   Alert and oriented. No new deficits.   Psych   Mood stable. Affect normal.       Additional Data:     Labs:  Results from last 7 days   Lab Units 09/07/24  0920 09/04/24  0438 09/03/24  1007 09/02/24  0733   WBC Thousand/uL 171.50*   < > 125.72* 113.22*   HEMOGLOBIN g/dL 6.5*   < > 5.7* 8.7*   HEMATOCRIT % 22.0*   < > 16.4* 24.1*   PLATELETS Thousands/uL 205   < > 304 347   BANDS PCT %  --   --   --  2   SEGS PCT %  --   --  6*  --    LYMPHO PCT % 90*   < > 93* 78*   MONO PCT % 1*   < > 1* 0*   EOS PCT % 0   < > 0 0    < > = values in this interval not displayed.     Results from last 7 days   Lab Units 09/07/24  0539 09/06/24  0617 09/06/24  0455   SODIUM mmol/L 130*   < > 127*   POTASSIUM mmol/L 5.2   < > 6.6*   CHLORIDE mmol/L 98   < > 99   CO2 mmol/L 28   < > 26   BUN mg/dL 24   < > 21   CREATININE mg/dL 0.86   < > 0.75   ANION GAP mmol/L 4   < > 2*   CALCIUM mg/dL 8.7   < > 8.4   ALBUMIN g/dL  --   --  3.8   TOTAL BILIRUBIN mg/dL  --   --  1.22*   ALK PHOS U/L  --   --  79   ALT U/L  --   --  51   AST U/L  --   --  37   GLUCOSE RANDOM mg/dL 136   < > 153*    < > = values in this interval not displayed.         Results from last 7 days   Lab Units 09/06/24  1423 09/06/24  0620   POC GLUCOSE mg/dl 182* 153*         Results from last 7 days   Lab Units 09/03/24  0425 09/02/24  0733   PROCALCITONIN ng/ml 0.19 0.06       Lines/Drains:  Invasive Devices       Peripheral Intravenous Line  Duration             Peripheral IV 09/04/24 Distal;Right;Upper;Ventral (anterior) Arm 3 days                          Imaging: Reviewed radiology reports from this admission including: ultrasound(s) and ECHO    Recent Cultures (last 7 days):    Results from last 7 days   Lab Units 09/02/24  1655 09/02/24  1259   SPUTUM CULTURE  2+ Growth of  --    GRAM STAIN RESULT  1+ Epithelial cells per low power field*  2+ Gram positive cocci in pairs*  2+ Gram variable rods*  No polys seen*  --    LEGIONELLA URINARY ANTIGEN   --  Negative       Last 24 Hours Medication List:   Current Facility-Administered Medications   Medication Dose Route Frequency Provider Last Rate    acetaminophen  650 mg Oral Q6H PRN Cyril Nick MD      albuterol  2 puff Inhalation Q4H PRN Cyril Nick MD      aluminum-magnesium hydroxide-simethicone  30 mL Oral Q6H PRN Cyril Nick MD      ascorbic acid  250 mg Oral Daily Imelda Reno PA-C      atorvastatin  20 mg Oral Daily With Dinner Cyril Nick MD      benzonatate  100 mg Oral TID PRN Cyril Nick MD      vitamin B-12  500 mcg Oral Daily GIAN RenteriaC      dabigatran etexilate  150 mg Oral Q12H [facility] Titus Portillo, DO      fish oil  1,000 mg Oral Daily Imelda Reno PA-C      furosemide  20 mg Oral Daily Titus Portillo, DO      guaiFENesin  600 mg Oral BID Cyril Nick MD      lidocaine  1 patch Topical Daily Fritz Cross PA-C      LORazepam  0.5 mg Oral Q8H PRN Titus Portillo, DO      montelukast  10 mg Oral Daily Cyril Nick MD      nadolol  80 mg Oral Early Morning Titus Portillo, DO      pantoprazole  40 mg Oral Early Morning Cyril Nick MD      polyethylene glycol  17 g Oral Daily PRN Missy Guevara PA-C      predniSONE  60 mg Oral Daily Imelda Reno PA-C      Followed by    [START ON 9/14/2024] predniSONE  40 mg Oral Daily Imelda Reno PA-C      Followed by    [START ON 9/21/2024] predniSONE  20 mg Oral Daily Imelda Reno PA-C      Followed by    [START ON 9/28/2024] predniSONE  10 mg Oral Daily Imelda Reno PA-C      saccharomyces boulardii  250 mg Oral BID Imelda Reno PA-C      Sodium Zirconium Cyclosilicate  10 g  Oral Daily Titus Portillo DO      trimethobenzamide  200 mg Intramuscular Q6H PRN Cyril Nick MD      zolpidem  5 mg Oral HS Cyril Nick MD          Today, Patient Was Seen By: Titus Portillo DO    **Please Note: This note may have been constructed using a voice recognition system.**

## 2024-09-07 NOTE — PLAN OF CARE
Problem: PAIN - ADULT  Goal: Verbalizes/displays adequate comfort level or baseline comfort level  Description: Interventions:  - Encourage patient to monitor pain and request assistance  - Assess pain using appropriate pain scale  - Administer analgesics based on type and severity of pain and evaluate response  - Implement non-pharmacological measures as appropriate and evaluate response  - Consider cultural and social influences on pain and pain management  - Notify physician/advanced practitioner if interventions unsuccessful or patient reports new pain  Outcome: Progressing     Problem: INFECTION - ADULT  Goal: Absence or prevention of progression during hospitalization  Description: INTERVENTIONS:  - Assess and monitor for signs and symptoms of infection  - Monitor lab/diagnostic results  - Monitor all insertion sites, i.e. indwelling lines, tubes, and drains  - Monitor endotracheal if appropriate and nasal secretions for changes in amount and color  - [place] appropriate cooling/warming therapies per order  - Administer medications as ordered  - Instruct and encourage patient and family to use good hand hygiene technique  - Identify and instruct in appropriate isolation precautions for identified infection/condition  Outcome: Progressing  Goal: Absence of fever/infection during neutropenic period  Description: INTERVENTIONS:  - Monitor WBC    Outcome: Progressing     Problem: SAFETY ADULT  Goal: Patient will remain free of falls  Description: INTERVENTIONS:  - Educate patient/family on patient safety including physical limitations  - Instruct patient to call for assistance with activity   - Consult OT/PT to assist with strengthening/mobility   - Keep Call bell within reach  - Keep bed low and locked with side rails adjusted as appropriate  - Keep care items and personal belongings within reach  - Initiate and maintain comfort rounds  - Make Fall Risk Sign visible to staff  - Apply yellow socks and bracelet  for high fall risk patients  - Consider moving patient to room near nurses station  Outcome: Progressing  Goal: Maintain or return to baseline ADL function  Description: INTERVENTIONS:  -  Assess patient's ability to carry out ADLs; assess patient's baseline for ADL function and identify physical deficits which impact ability to perform ADLs (bathing, care of mouth/teeth, toileting, grooming, dressing, etc.)  - Assess/evaluate cause of self-care deficits   - Assess range of motion  - Assess patient's mobility; develop plan if impaired  - Assess patient's need for assistive devices and provide as appropriate  - Encourage maximum independence but intervene and supervise when necessary  - Involve family in performance of ADLs  - Assess for home care needs following discharge   - Consider OT consult to assist with ADL evaluation and planning for discharge  - Provide patient education as appropriate  Outcome: Progressing  Goal: Maintains/Returns to pre admission functional level  Description: INTERVENTIONS:  - Perform AM-PAC 6 Click Basic Mobility/ Daily Activity assessment daily.  - Set and communicate daily mobility goal to care team and patient/family/caregiver.   - Collaborate with rehabilitation services on mobility goals if consulted  - Perform Range of Motion 4 times a day.  - Reposition patient every 2 hours.  - Dangle patient 4 times a day  - Stand patient 4 times a day  - Ambulate patient 4 times a day  - Out of bed to chair 4 times a day   - Out of bed for meals 3 times a day  - Out of bed for toileting  - Record patient progress and toleration of activity level   Outcome: Progressing     Problem: DISCHARGE PLANNING  Goal: Discharge to home or other facility with appropriate resources  Description: INTERVENTIONS:  - Identify barriers to discharge w/patient and caregiver  - Arrange for needed discharge resources and transportation as appropriate  - Identify discharge learning needs (meds, wound care, etc.)  -  Arrange for interpretive services to assist at discharge as needed  - Refer to Case Management Department for coordinating discharge planning if the patient needs post-hospital services based on physician/advanced practitioner order or complex needs related to functional status, cognitive ability, or social support system  Outcome: Progressing     Problem: Knowledge Deficit  Goal: Patient/family/caregiver demonstrates understanding of disease process, treatment plan, medications, and discharge instructions  Description: Complete learning assessment and assess knowledge base.  Interventions:  - Provide teaching at level of understanding  - Provide teaching via preferred learning methods  Outcome: Progressing

## 2024-09-07 NOTE — PLAN OF CARE
Problem: PAIN - ADULT  Goal: Verbalizes/displays adequate comfort level or baseline comfort level  Description: Interventions:  - Encourage patient to monitor pain and request assistance  - Assess pain using appropriate pain scale  - Administer analgesics based on type and severity of pain and evaluate response  - Implement non-pharmacological measures as appropriate and evaluate response  - Consider cultural and social influences on pain and pain management  - Notify physician/advanced practitioner if interventions unsuccessful or patient reports new pain  Outcome: Progressing     Problem: INFECTION - ADULT  Goal: Absence or prevention of progression during hospitalization  Description: INTERVENTIONS:  - Assess and monitor for signs and symptoms of infection  - Monitor lab/diagnostic results  - Monitor all insertion sites, i.e. indwelling lines, tubes, and drains  - Monitor endotracheal if appropriate and nasal secretions for changes in amount and color  - [place] appropriate cooling/warming therapies per order  - Administer medications as ordered  - Instruct and encourage patient and family to use good hand hygiene technique  - Identify and instruct in appropriate isolation precautions for identified infection/condition  Outcome: Progressing  Goal: Absence of fever/infection during neutropenic period  Description: INTERVENTIONS:  - Monitor WBC    Outcome: Progressing     Problem: SAFETY ADULT  Goal: Patient will remain free of falls  Description: INTERVENTIONS:  - Educate patient/family on patient safety including physical limitations  - Instruct patient to call for assistance with activity   - Consult OT/PT to assist with strengthening/mobility   - Keep Call bell within reach  - Keep bed low and locked with side rails adjusted as appropriate  - Keep care items and personal belongings within reach  - Initiate and maintain comfort rounds  - Make Fall Risk Sign visible to staff  - Offer Toileting every 2 Hours,  in advance of need  - Initiate/Maintain bed alarm  - Obtain necessary fall risk management equipment:   - Apply yellow socks and bracelet for high fall risk patients  - Consider moving patient to room near nurses station  Outcome: Progressing  Goal: Maintain or return to baseline ADL function  Description: INTERVENTIONS:  -  Assess patient's ability to carry out ADLs; assess patient's baseline for ADL function and identify physical deficits which impact ability to perform ADLs (bathing, care of mouth/teeth, toileting, grooming, dressing, etc.)  - Assess/evaluate cause of self-care deficits   - Assess range of motion  - Assess patient's mobility; develop plan if impaired  - Assess patient's need for assistive devices and provide as appropriate  - Encourage maximum independence but intervene and supervise when necessary  - Involve family in performance of ADLs  - Assess for home care needs following discharge   - Consider OT consult to assist with ADL evaluation and planning for discharge  - Provide patient education as appropriate  Outcome: Progressing  Goal: Maintains/Returns to pre admission functional level  Description: INTERVENTIONS:  - Perform AM-PAC 6 Click Basic Mobility/ Daily Activity assessment daily.  - Set and communicate daily mobility goal to care team and patient/family/caregiver.   - Collaborate with rehabilitation services on mobility goals if consulted  - Perform Range of Motion 3 times a day.  - Reposition patient every 2 hours.  - Dangle patient 3 times a day  - Stand patient 3 times a day  - Ambulate patient 3 times a day  - Out of bed to chair 3 times a day   - Out of bed for meals 3 times a day  - Out of bed for toileting  - Record patient progress and toleration of activity level   Outcome: Progressing

## 2024-09-07 NOTE — PROGRESS NOTES
"Progress Note - Murphy Willams 73 y.o. male MRN: [id number]    Unit/Bed#: [place] [address] -01 Encounter: [id number]      Assessment:  Mr. Willams is a 73-year-old male with history of CLL and recently treated for PNA with Augmentin presenting with cough.   On admission elevated .22, low Hgb 8.7, and platelets wnl.    Plan:  1.  CLL  Diagnosed in 2010 and was followed up by Dr. Jackson, now Dr. Hanley.  He has had elevation of his WBCs doubling his normal elevated level.  Had developed significant drop in hemoglobin concerning for hemolytic anemia and treated with steroids, and WBC is higher now.  Differential includes reactionary versus progression of his CLL versus due to steroids, but can also be multifactorial.    Given worsening anemia and nonresponse to steroids, sent off leukemia lymphoma flow cytometry.  Discussed that may need to perform bone marrow biopsy for further evaluation.      Explained that I would discuss with Dr. Hanley on Monday for ongoing evaluation and monitoring and additional testing needed.    Plan for follow-up with Dr. Hanley in the outpatient setting once he is discharged from the hospital.      2.  Macrocytic anemia  Hemolysis workup was done and although mainly negative, Narciso was positive.  Had been started on IV Solu-Medrol and then oral prednisone.  Requiring continued transfusion with some stability although again 6.5 today.  Will get another unit of packed red blood cells.    Will continue to monitor his CBC while is here and consider potential need for bone marrow biopsy if not improved.        Subjective:   He is doing well.  Concerned about ongoing issues with his white blood cells and his dropping hemoglobin.  No other complaints at this time.  Does describe the rash on his lower extremity after antibiotic.  It is not itchy or painful.    Objective:     Vitals: Blood pressure 132/67, pulse 70, temperature (!) 97.4 °F (36.3 °C), resp. rate 16, height 5' 8\" (1.727 m), " weight 86.6 kg (190 lb 14.7 oz), SpO2 95%.,Body mass index is 29.03 kg/m².      Intake/Output Summary (Last 24 hours) at 9/7/2024 1741  Last data filed at 9/7/2024 1256  Gross per 24 hour   Intake 960 ml   Output 600 ml   Net 360 ml       Physical Exam:     Awake and alert.  No acute distress.  Head/neck normocephalic and atraumatic  No scleral icterus and extra ocular muscles intact  No labored breathing.  No wheezing.  Abdomen is nondistended  No concerning skin lesions.  Reticular rash on bilateral lower extremities  Alert and oriented x 3.  No neurodeficits.  Mood is stable.  Behavioral normal.    Invasive Devices       Peripheral Intravenous Line  Duration             Peripheral IV 09/04/24 Distal;Right;Upper;Ventral (anterior) Arm 3 days                    Lab, Imaging and other studies: I have personally reviewed pertinent reports.      VTE Mechanical Prophylaxis: sequential compression device       Aspen Garcia MD, PhD

## 2024-09-07 NOTE — PROGRESS NOTES
NEPHROLOGY PROGRESS NOTE   Murphy Willams 73 y.o. male MRN: [id number]  Unit/Bed#: [place] [address] -01 Encounter: [id number]  Reason for Consult: Hyperkalemia    ASSESSMENT/PLAN:  Hyperkalemia: Suspect medication related on Aldactone and ARB as well as possible hemolytic anemia, CLL.  -Per chart review, previous potassium levels were at normal range.  -Status post medical management and Lokelma.  -Monitoring on low potassium diet.  -Holding losartan and Aldactone.  -Started on Lasix 20 mg daily and Lokelma 10 g po daily. Continue at discharge.  -Repeat BMP in AM.    Acute on chronic hyponatremia:   -Baseline sodium high 120s to low 130s since 2014.  -Monitoring on 1.8 L fluid restriction.  -Started on lasix 20 mg po daily.   -Serum osmolality minimally low, urine sodium 18, urine osmolality 457.    Anemia/CLL:  -Hematology/oncology following.  Concern for hemolytic anemia however workup unremarkable.  Empirically placed on IV steroids followed by prednisone taper.  -Receiving blood transfusion as needed.  -Checking occult stools.    Other: A-fib, pneumonia, rash.    Disposition: Okay to discharge from renal.  Would continue to hold losartan and Aldactone at discharge.  Continue Lasix 20 mg daily and fluid restriction.  Recommend repeat BMP in 1 week.    SUBJECTIVE:  Resting in his chair.  Denies chest pain or shortness of breath.  Denies nausea, vomiting, diarrhea.  Upset about low potassium and fluid restricted diet.    OBJECTIVE:  Current Weight: Weight - Scale: 86.6 kg (190 lb 14.7 oz)  Vitals:    09/07/24 0334 09/07/24 0601 09/07/24 0731 09/07/24 1135   BP: 122/66 123/73 127/69 132/67   BP Location: Right arm      Pulse: 71 76 70 70   Resp: 18 18 16    Temp: 97.7 °F (36.5 °C) 97.9 °F (36.6 °C) 98.2 °F (36.8 °C) (!) 97.4 °F (36.3 °C)   TempSrc: Oral  Oral    SpO2: 94% 92% 94% 95%   Weight:       Height:           Intake/Output Summary (Last 24 hours) at 9/7/2024 1243  Last data filed at 9/7/2024 0334  Gross per 24  hour   Intake 1440 ml   Output 1250 ml   Net 190 ml     General: NAD  Skin: warm, dry  HEENT: Moist mucous membranes, sclera anicteric, normocephalic, atraumatic  Neck: No apparent JVD appreciated  Chest: lung sounds clear B/L  CVS: Regular rate and rhythm  Abdomen: Soft, round, non-tender, +BS  Extremities: No B/L LE edema present  Neuro: alert and oriented  Psych: appropriate mood and affect     Medications:    Current Facility-Administered Medications:     acetaminophen (TYLENOL) tablet 650 mg, 650 mg, Oral, Q6H PRN, Cyril Nick MD, 650 mg at 09/05/24 2258    albuterol (PROVENTIL HFA,VENTOLIN HFA) inhaler 2 puff, 2 puff, Inhalation, Q4H PRN, Cyril Nick MD    aluminum-magnesium hydroxide-simethicone (MAALOX) oral suspension 30 mL, 30 mL, Oral, Q6H PRN, Cyril Nick MD, 30 mL at 09/03/24 1924    ascorbic acid (VITAMIN C) tablet 250 mg, 250 mg, Oral, Daily, Imelda Reno PA-C, 250 mg at 09/07/24 0930    atorvastatin (LIPITOR) tablet 20 mg, 20 mg, Oral, Daily With Dinner, Cyril Nick MD, 20 mg at 09/06/24 1730    benzonatate (TESSALON PERLES) capsule 100 mg, 100 mg, Oral, TID PRN, Cyril Nick MD, 100 mg at 09/05/24 0941    cyanocobalamin (VITAMIN B-12) tablet 500 mcg, 500 mcg, Oral, Daily, Imelda Reno PA-C, 500 mcg at 09/07/24 0930    dabigatran etexilate (PRADAXA) capsule 150 mg, 150 mg, Oral, Q12H CHET, Titus Portillo DO, 150 mg at 09/07/24 0930    fish oil capsule 1,000 mg, 1,000 mg, Oral, Daily, Imelda Reno PA-C, 1,000 mg at 09/07/24 0930    furosemide (LASIX) tablet 20 mg, 20 mg, Oral, Daily, Titus Portillo DO, 20 mg at 09/07/24 0937    guaiFENesin (MUCINEX) 12 hr tablet 600 mg, 600 mg, Oral, BID, Cyril Nick MD, 600 mg at 09/07/24 0930    lidocaine (LIDODERM) 5 % patch 1 patch, 1 patch, Topical, Daily, Fritz Cross PA-C, 1 patch at 09/07/24 0931    LORazepam (ATIVAN) tablet 0.5 mg, 0.5 mg, Oral, Q8H PRN, Titus Portillo DO, 0.5 mg at 09/06/24  2141    montelukast (SINGULAIR) tablet 10 mg, 10 mg, Oral, Daily, Cyril Nick MD, 10 mg at 09/07/24 0930    nadolol (CORGARD) tablet 80 mg, 80 mg, Oral, Early Morning, Titus Portillo DO, 80 mg at 09/07/24 0602    pantoprazole (PROTONIX) EC tablet 40 mg, 40 mg, Oral, Early Morning, Cyril Nick MD, 40 mg at 09/07/24 0602    polyethylene glycol (MIRALAX) packet 17 g, 17 g, Oral, Daily PRN, Missy Guevara PA-C, 17 g at 09/07/24 0037    predniSONE tablet 60 mg, 60 mg, Oral, Daily, 60 mg at 09/07/24 0930 **FOLLOWED BY** [START ON 9/14/2024] predniSONE tablet 40 mg, 40 mg, Oral, Daily **FOLLOWED BY** [START ON 9/21/2024] predniSONE tablet 20 mg, 20 mg, Oral, Daily **FOLLOWED BY** [START ON 9/28/2024] predniSONE tablet 10 mg, 10 mg, Oral, Daily, Imelda Reno PA-C    saccharomyces boulardii (FLORASTOR) capsule 250 mg, 250 mg, Oral, BID, Imelda Reno PA-C, 250 mg at 09/07/24 0931    Sodium Zirconium Cyclosilicate (Lokelma) 10 g, 10 g, Oral, Daily, Titus Portillo DO, 10 g at 09/07/24 0931    trimethobenzamide (TIGAN) IM injection 200 mg, 200 mg, Intramuscular, Q6H PRN, Cyril Nick MD, 200 mg at 09/02/24 1806    zolpidem (AMBIEN) tablet 5 mg, 5 mg, Oral, HS, Cyril Nick MD, 5 mg at 09/07/24 0037    Laboratory Results:  Results from last 7 days   Lab Units 09/07/24  0539 09/06/24  1218 09/06/24  0617 09/06/24  0455 09/05/24  0400 09/04/24  0438 09/03/24  1007 09/03/24  0425   WBC Thousand/uL  --   --   --  138.64* 125.54* 128.05*   < >  --    HEMOGLOBIN g/dL  --   --   --  7.3* 6.3* 6.4*   < >  --    HEMATOCRIT %  --   --   --  22.6* 19.1* 18.9*   < >  --    PLATELETS Thousands/uL  --   --   --  255 265 312   < >  --    SODIUM mmol/L 130* 128*  --  127* 128* 132*   < > 125*   POTASSIUM mmol/L 5.2 5.6* 6.3* 6.6* 5.2 4.9  --  4.6   CHLORIDE mmol/L 98 98  --  99 98 100  --  96   CO2 mmol/L 28 26  --  26 23 24  --  21   BUN mg/dL 24 24  --  21 17 20  --  17   CREATININE mg/dL 0.86 0.78   --  0.75 0.77 0.87  --  0.84   CALCIUM mg/dL 8.7 8.8  --  8.4 8.4 8.5  --  8.4   ALK PHOS U/L  --   --   --  79 92  --   --  80   ALT U/L  --   --   --  51 27  --   --  20   AST U/L  --   --   --  37 25  --   --  29    < > = values in this interval not displayed.

## 2024-09-08 LAB
ABO GROUP BLD BPU: NORMAL
ANION GAP SERPL CALCULATED.3IONS-SCNC: 5 MMOL/L (ref 4–13)
BPU ID: NORMAL
BUN SERPL-MCNC: 22 MG/DL (ref 5–25)
CALCIUM SERPL-MCNC: 7.9 MG/DL (ref 8.4–10.2)
CHLORIDE SERPL-SCNC: 99 MMOL/L (ref 96–108)
CO2 SERPL-SCNC: 29 MMOL/L (ref 21–32)
CREAT SERPL-MCNC: 0.77 MG/DL (ref 0.6–1.3)
CROSSMATCH: NORMAL
GFR SERPL CREATININE-BSD FRML MDRD: 91 ML/MIN/1.73SQ M
GLUCOSE SERPL-MCNC: 102 MG/DL (ref 65–140)
HCT VFR BLD AUTO: 22 % (ref 36.5–49.3)
HCT VFR BLD AUTO: 30 % (ref 36.5–49.3)
HGB BLD-MCNC: 11 G/DL (ref 12–17)
HGB BLD-MCNC: 6.5 G/DL (ref 12–17)
HGB RETIC QN AUTO: 39.4 PG (ref 30–38.3)
IMM RETICS NFR: 41.6 % (ref 0–14)
MCH RBC QN AUTO: 31.7 PG (ref 26.8–34.3)
MCH RBC QN AUTO: 43.1 PG (ref 26.8–34.3)
MCHC RBC AUTO-ENTMCNC: 29.5 G/DL (ref 31.4–37.4)
MCHC RBC AUTO-ENTMCNC: 36.7 G/DL (ref 31.4–37.4)
MCV RBC AUTO: 107 FL (ref 82–98)
MCV RBC AUTO: 118 FL (ref 82–98)
NRBC BLD AUTO-RTO: 0 /100 WBCS
NRBC BLD AUTO-RTO: 0 /100 WBCS
PLATELET # BLD AUTO: 265 THOUSANDS/UL (ref 149–390)
PLATELET # BLD AUTO: 340 THOUSANDS/UL (ref 149–390)
PMV BLD AUTO: 10.5 FL (ref 8.9–12.7)
PMV BLD AUTO: 9.7 FL (ref 8.9–12.7)
POTASSIUM SERPL-SCNC: 4.6 MMOL/L (ref 3.5–5.3)
RBC # BLD AUTO: 2.05 MILLION/UL (ref 3.88–5.62)
RBC # BLD AUTO: 2.55 MILLION/UL (ref 3.88–5.62)
RETICS # AUTO: ABNORMAL 10*3/UL (ref 14356–105094)
RETICS # CALC: 6.17 % (ref 0.37–1.87)
SODIUM SERPL-SCNC: 133 MMOL/L (ref 135–147)
UNIT DISPENSE STATUS: NORMAL
UNIT PRODUCT CODE: NORMAL
UNIT PRODUCT VOLUME: 350 ML
UNIT RH: NORMAL
WBC # BLD AUTO: 232.3 THOUSAND/UL (ref 4.31–10.16)
WBC # BLD AUTO: 240.15 THOUSAND/UL (ref 4.31–10.16)

## 2024-09-08 PROCEDURE — P9040 RBC LEUKOREDUCED IRRADIATED: HCPCS

## 2024-09-08 PROCEDURE — 99232 SBSQ HOSP IP/OBS MODERATE 35: CPT | Performed by: INTERNAL MEDICINE

## 2024-09-08 PROCEDURE — 99233 SBSQ HOSP IP/OBS HIGH 50: CPT | Performed by: INTERNAL MEDICINE

## 2024-09-08 PROCEDURE — 85027 COMPLETE CBC AUTOMATED: CPT | Performed by: INTERNAL MEDICINE

## 2024-09-08 PROCEDURE — 80048 BASIC METABOLIC PNL TOTAL CA: CPT | Performed by: INTERNAL MEDICINE

## 2024-09-08 PROCEDURE — 30233N1 TRANSFUSION OF NONAUTOLOGOUS RED BLOOD CELLS INTO PERIPHERAL VEIN, PERCUTANEOUS APPROACH: ICD-10-PCS | Performed by: INTERNAL MEDICINE

## 2024-09-08 PROCEDURE — 85046 RETICYTE/HGB CONCENTRATE: CPT | Performed by: INTERNAL MEDICINE

## 2024-09-08 RX ADMIN — OMEGA-3 FATTY ACIDS CAP 1000 MG 1000 MG: 1000 CAP at 09:10

## 2024-09-08 RX ADMIN — MONTELUKAST 10 MG: 10 TABLET, FILM COATED ORAL at 09:10

## 2024-09-08 RX ADMIN — LORAZEPAM 0.5 MG: 0.5 TABLET ORAL at 21:22

## 2024-09-08 RX ADMIN — NADOLOL 80 MG: 20 TABLET ORAL at 05:04

## 2024-09-08 RX ADMIN — Medication 250 MG: at 16:31

## 2024-09-08 RX ADMIN — GUAIFENESIN 600 MG: 600 TABLET, EXTENDED RELEASE ORAL at 21:22

## 2024-09-08 RX ADMIN — PANTOPRAZOLE SODIUM 40 MG: 40 TABLET, DELAYED RELEASE ORAL at 05:04

## 2024-09-08 RX ADMIN — ATORVASTATIN CALCIUM 20 MG: 20 TABLET, FILM COATED ORAL at 16:31

## 2024-09-08 RX ADMIN — POLYETHYLENE GLYCOL 3350 17 G: 17 POWDER, FOR SOLUTION ORAL at 09:11

## 2024-09-08 RX ADMIN — LORAZEPAM 0.5 MG: 0.5 TABLET ORAL at 09:10

## 2024-09-08 RX ADMIN — CYANOCOBALAMIN TAB 500 MCG 500 MCG: 500 TAB at 09:10

## 2024-09-08 RX ADMIN — ZOLPIDEM TARTRATE 5 MG: 5 TABLET, COATED ORAL at 21:22

## 2024-09-08 RX ADMIN — FUROSEMIDE 20 MG: 20 TABLET ORAL at 09:10

## 2024-09-08 RX ADMIN — BENZONATATE 100 MG: 100 CAPSULE ORAL at 21:22

## 2024-09-08 RX ADMIN — Medication 250 MG: at 09:10

## 2024-09-08 RX ADMIN — GUAIFENESIN 600 MG: 600 TABLET, EXTENDED RELEASE ORAL at 09:10

## 2024-09-08 RX ADMIN — SODIUM ZIRCONIUM CYCLOSILICATE 10 G: 10 POWDER, FOR SUSPENSION ORAL at 09:11

## 2024-09-08 RX ADMIN — SENNOSIDES 8.6 MG: 8.6 TABLET, FILM COATED ORAL at 21:22

## 2024-09-08 RX ADMIN — LIDOCAINE 1 PATCH: 700 PATCH TOPICAL at 09:11

## 2024-09-08 RX ADMIN — PREDNISONE 60 MG: 20 TABLET ORAL at 09:10

## 2024-09-08 NOTE — PROGRESS NOTES
NEPHROLOGY PROGRESS NOTE   Murphy Willams 73 y.o. male MRN: [id number]  Unit/Bed#: [place] [address] -01 Encounter: [id number]  Reason for Consult: Hyperkalemia    ASSESSMENT/PLAN:  Hyperkalemia: Suspect medication related on Aldactone and ARB as well as possible hemolytic anemia, CLL.  -Per chart review, previous potassium levels were at normal range.  -Status post medical management and Lokelma.  -Repeat potassium level at normal range.  -Monitoring on low potassium diet.  -Holding losartan and Aldactone.  -Continues on Lasix 20 mg daily and Lokelma 10 g po daily.  -Repeat BMP in AM.     Acute on chronic hyponatremia:   -Baseline sodium high 120s to low 130s since 2014.  -Sodium level improved to baseline.  -Monitoring on 1.8 L fluid restriction + lasix 20 mg po daily.   -Serum osmolality minimally low, urine sodium 18, urine osmolality 457.     Anemia/CLL:  -Hematology/oncology following.  Concern for hemolytic anemia however workup unremarkable.  Empirically placed on IV steroids followed by prednisone taper.  May need bone marrow biopsy.  -Receiving blood transfusion as needed.  -Checking occult stools.     Other: A-fib, pneumonia, rash.    Disposition: Stable for discharge from nephrology standpoint once medically cleared.    SUBJECTIVE:  Resting in bed.  Denies chest pain or shortness of breath.  Denies nausea, vomiting, diarrhea.    OBJECTIVE:  Current Weight: Weight - Scale: 86.6 kg (190 lb 14.7 oz)  Vitals:    09/07/24 2036 09/07/24 2038 09/08/24 0504 09/08/24 0744   BP: 142/77  123/67 119/67   BP Location:       Pulse: 74  72 76   Resp:  17     Temp: 97.9 °F (36.6 °C)   97.9 °F (36.6 °C)   TempSrc:       SpO2: 93%  95% 94%   Weight:       Height:           Intake/Output Summary (Last 24 hours) at 9/8/2024 0903  Last data filed at 9/8/2024 0601  Gross per 24 hour   Intake 1294.5 ml   Output 1400 ml   Net -105.5 ml     General: NAD  Skin: warm, dry, rash B/L LE  HEENT: Moist mucous membranes, sclera anicteric,  normocephalic, atraumatic  Neck: No apparent JVD appreciated  Chest: lung sounds clear B/L  CVS: Regular rate and rhythm  Abdomen: Soft, round, non-tender, +BS  Extremities: No B/L LE edema present  Neuro: alert and oriented  Psych: appropriate mood and affect     Medications:    Current Facility-Administered Medications:     acetaminophen (TYLENOL) tablet 650 mg, 650 mg, Oral, Q6H PRN, Cyril Nick MD, 650 mg at 09/05/24 2258    albuterol (PROVENTIL HFA,VENTOLIN HFA) inhaler 2 puff, 2 puff, Inhalation, Q4H PRN, Cyril Nick MD    aluminum-magnesium hydroxide-simethicone (MAALOX) oral suspension 30 mL, 30 mL, Oral, Q6H PRN, Cyril Nick MD, 30 mL at 09/03/24 1924    ascorbic acid (VITAMIN C) tablet 250 mg, 250 mg, Oral, Daily, Imelda Reno PA-C, 250 mg at 09/07/24 0930    atorvastatin (LIPITOR) tablet 20 mg, 20 mg, Oral, Daily With Dinner, Cyril Nick MD, 20 mg at 09/07/24 1743    benzonatate (TESSALON PERLES) capsule 100 mg, 100 mg, Oral, TID PRN, Cyril Nick MD, 100 mg at 09/07/24 2105    cyanocobalamin (VITAMIN B-12) tablet 500 mcg, 500 mcg, Oral, Daily, Imelda Reno PA-C, 500 mcg at 09/07/24 0930    fish oil capsule 1,000 mg, 1,000 mg, Oral, Daily, Imelda Reno PA-C, 1,000 mg at 09/07/24 0930    furosemide (LASIX) tablet 20 mg, 20 mg, Oral, Daily, Titus Portillo DO, 20 mg at 09/07/24 0937    guaiFENesin (MUCINEX) 12 hr tablet 600 mg, 600 mg, Oral, BID, Cyril Nick MD, 600 mg at 09/07/24 2105    lidocaine (LIDODERM) 5 % patch 1 patch, 1 patch, Topical, Daily, Fritz Cross PA-C, 1 patch at 09/07/24 0931    LORazepam (ATIVAN) tablet 0.5 mg, 0.5 mg, Oral, Q8H PRN, Titus Portillo DO, 0.5 mg at 09/07/24 2105    montelukast (SINGULAIR) tablet 10 mg, 10 mg, Oral, Daily, Cyril Nick MD, 10 mg at 09/07/24 0930    nadolol (CORGARD) tablet 80 mg, 80 mg, Oral, Early Morning, Titus Portillo DO, 80 mg at 09/08/24 0504    pantoprazole (PROTONIX) EC tablet  40 mg, 40 mg, Oral, Early Morning, Cyril Nick MD, 40 mg at 09/08/24 0504    polyethylene glycol (MIRALAX) packet 17 g, 17 g, Oral, Daily PRN, Missy Guevara PA-C, 17 g at 09/07/24 2117    polyethylene glycol (MIRALAX) packet 17 g, 17 g, Oral, Daily, Titus Portillo DO    predniSONE tablet 60 mg, 60 mg, Oral, Daily, 60 mg at 09/07/24 0930 **FOLLOWED BY** [START ON 9/14/2024] predniSONE tablet 40 mg, 40 mg, Oral, Daily **FOLLOWED BY** [START ON 9/21/2024] predniSONE tablet 20 mg, 20 mg, Oral, Daily **FOLLOWED BY** [START ON 9/28/2024] predniSONE tablet 10 mg, 10 mg, Oral, Daily, Imelda Reno PA-C    saccharomyces boulardii (FLORASTOR) capsule 250 mg, 250 mg, Oral, BID, Imelda Reno PA-C, 250 mg at 09/07/24 1743    senna (SENOKOT) tablet 8.6 mg, 1 tablet, Oral, HS, Titus Portillo DO, 8.6 mg at 09/07/24 2105    Sodium Zirconium Cyclosilicate (Lokelma) 10 g, 10 g, Oral, Daily, Titus Portillo DO, 10 g at 09/07/24 0931    trimethobenzamide (TIGAN) IM injection 200 mg, 200 mg, Intramuscular, Q6H PRN, Cyril Nick MD, 200 mg at 09/02/24 1806    zolpidem (AMBIEN) tablet 5 mg, 5 mg, Oral, HS, Cyril Nick MD, 5 mg at 09/07/24 2300    Laboratory Results:  Results from last 7 days   Lab Units 09/08/24  0455 09/07/24  0920 09/07/24  0539 09/06/24  1218 09/06/24  0617 09/06/24  0455 09/05/24  0400 09/03/24  1007 09/03/24  0425   WBC Thousand/uL 232.30* 171.50*  --   --   --  138.64* 125.54*   < >  --    HEMOGLOBIN g/dL 11.0* 6.5*  --   --   --  7.3* 6.3*   < >  --    HEMATOCRIT % 30.0* 22.0*  --   --   --  22.6* 19.1*   < >  --    PLATELETS Thousands/uL 340 205  --   --   --  255 265   < >  --    SODIUM mmol/L 133*  --  130* 128*  --  127* 128*   < > 125*   POTASSIUM mmol/L 4.6  --  5.2 5.6*   < > 6.6* 5.2   < > 4.6   CHLORIDE mmol/L 99  --  98 98  --  99 98   < > 96   CO2 mmol/L 29  --  28 26  --  26 23   < > 21   BUN mg/dL 22  --  24 24  --  21 17   < > 17   CREATININE mg/dL  0.77  --  0.86 0.78  --  0.75 0.77   < > 0.84   CALCIUM mg/dL 7.9*  --  8.7 8.8  --  8.4 8.4   < > 8.4   ALK PHOS U/L  --   --   --   --   --  79 92  --  80   ALT U/L  --   --   --   --   --  51 27  --  20   AST U/L  --   --   --   --   --  37 25  --  29    < > = values in this interval not displayed.

## 2024-09-08 NOTE — PLAN OF CARE
Problem: PAIN - ADULT  Goal: Verbalizes/displays adequate comfort level or baseline comfort level  Description: Interventions:  - Encourage patient to monitor pain and request assistance  - Assess pain using appropriate pain scale  - Administer analgesics based on type and severity of pain and evaluate response  - Implement non-pharmacological measures as appropriate and evaluate response  - Consider cultural and social influences on pain and pain management  - Notify physician/advanced practitioner if interventions unsuccessful or patient reports new pain  Outcome: Progressing     Problem: INFECTION - ADULT  Goal: Absence or prevention of progression during hospitalization  Description: INTERVENTIONS:  - Assess and monitor for signs and symptoms of infection  - Monitor lab/diagnostic results  - Monitor all insertion sites, i.e. indwelling lines, tubes, and drains  - Monitor endotracheal if appropriate and nasal secretions for changes in amount and color  - [place] appropriate cooling/warming therapies per order  - Administer medications as ordered  - Instruct and encourage patient and family to use good hand hygiene technique  - Identify and instruct in appropriate isolation precautions for identified infection/condition  Outcome: Progressing  Goal: Absence of fever/infection during neutropenic period  Description: INTERVENTIONS:  - Monitor WBC    Outcome: Progressing     Problem: SAFETY ADULT  Goal: Patient will remain free of falls  Description: INTERVENTIONS:  - Educate patient/family on patient safety including physical limitations  - Instruct patient to call for assistance with activity   - Consult OT/PT to assist with strengthening/mobility   - Keep Call bell within reach  - Keep bed low and locked with side rails adjusted as appropriate  - Keep care items and personal belongings within reach  - Initiate and maintain comfort rounds  - Make Fall Risk Sign visible to staff  - Offer Toileting every  Hours,  in advance of need  - Initiate/Maintain alarm  - Obtain necessary fall risk management equipment:   - Apply yellow socks and bracelet for high fall risk patients  - Consider moving patient to room near nurses station  Outcome: Progressing  Goal: Maintain or return to baseline ADL function  Description: INTERVENTIONS:  -  Assess patient's ability to carry out ADLs; assess patient's baseline for ADL function and identify physical deficits which impact ability to perform ADLs (bathing, care of mouth/teeth, toileting, grooming, dressing, etc.)  - Assess/evaluate cause of self-care deficits   - Assess range of motion  - Assess patient's mobility; develop plan if impaired  - Assess patient's need for assistive devices and provide as appropriate  - Encourage maximum independence but intervene and supervise when necessary  - Involve family in performance of ADLs  - Assess for home care needs following discharge   - Consider OT consult to assist with ADL evaluation and planning for discharge  - Provide patient education as appropriate  Outcome: Progressing  Goal: Maintains/Returns to pre admission functional level  Description: INTERVENTIONS:  - Perform AM-PAC 6 Click Basic Mobility/ Daily Activity assessment daily.  - Set and communicate daily mobility goal to care team and patient/family/caregiver.   - Collaborate with rehabilitation services on mobility goals if consulted  - Perform Range of Motion  times a day.  - Reposition patient every  hours.  - Dangle patient  times a day  - Stand patient  times a day  - Ambulate patient  times a day  - Out of bed to chair  times a day   - Out of bed for meals  times a day  - Out of bed for toileting  - Record patient progress and toleration of activity level   Outcome: Progressing     Problem: DISCHARGE PLANNING  Goal: Discharge to home or other facility with appropriate resources  Description: INTERVENTIONS:  - Identify barriers to discharge w/patient and caregiver  - Arrange for  needed discharge resources and transportation as appropriate  - Identify discharge learning needs (meds, wound care, etc.)  - Arrange for interpretive services to assist at discharge as needed  - Refer to Case Management Department for coordinating discharge planning if the patient needs post-hospital services based on physician/advanced practitioner order or complex needs related to functional status, cognitive ability, or social support system  Outcome: Progressing     Problem: Knowledge Deficit  Goal: Patient/family/caregiver demonstrates understanding of disease process, treatment plan, medications, and discharge instructions  Description: Complete learning assessment and assess knowledge base.  Interventions:  - Provide teaching at level of understanding  - Provide teaching via preferred learning methods  Outcome: Progressing

## 2024-09-08 NOTE — PROGRESS NOTES
[facility]  Progress Note  Name: Murphy Willams I  MRN: [id number]  Unit/Bed#: [place] [address] -01 I Date of Admission: 9/2/2024   Date of Service: 9/8/2024 I Hospital Day: 6    Assessment & Plan   * Anemia  Assessment & Plan  Patient presented to the hospital with worsening cough and shortness of breath.  He is recently treated for pneumonia.  In addition was found to have acute anemia with at 8.7 on admission, and 5.7 the following day.  Has a distant history of autoimmune hemolytic anemia    Appreciate hematology/oncology recommendations  hemolysis workup generally unremarkable despite positive Narciso test  Given prior history of hemolytic anemia, hematology has recommended treating with steroids.  He is currently on prednisone taper.  Transfused total of 3 units PRBCs this hospitalization and hemoglobin now at 7.3 g  He has no signs or symptoms of significant bleeding and has been restarted on his home Pradaxa: Currently on hold for bone marrow biopsy.  9/8: Hemoglobin again 6.5 despite transfusion yesterday.  Will transfuse an additional 1 unit PRBCs.  Unclear on there including etiology though with concomitant rise in WBC concerns for active leukemia.  Flow cytometry pending  Bone marrow biopsy ordered for tomorrow 9/9    Hyperkalemia  Assessment & Plan  Acute development of hyperkalemia on morning of 9/6 at 6.6  Has since been treated with insulin/dextrose/calcium/bicarb x 2    Continue on Lokelma 10 mg daily  Continue on p.o. Lasix 20 mg daily  Holding home losartan and spironolactone  Low potassium diet  Appreciate nephrology recommendations  Monitor daily BMP    CLL (chronic lymphocytic leukemia) (HCC)  Assessment & Plan  Currently follow with Dr. Hanley, not on any current medications  WBC has been significantly elevated as high as 138,000 this hospitalization  Could be reactive in the setting of recent pneumonia, however cannot rule out progression of CLL    WBC continues to  rise and is currently at 240,000  Follow-up flow cytometry  IR consulted for bone marrow biopsy  Monitor daily CBC  Follow-up any additional oncology recommendations  Outpatient follow-up with Dr. Hanley    Rash  Assessment & Plan  Lacy rash over bilateral lower extremities from the thighs to the ankle.  Mildly improved since admission though still prominent.  Developed a rash shortly after completing Augmentin therapy  Question autoimmune component given history of CLL with autoimmune hemolytic anemia  Continue to monitor with supportive care time  Monitor with improvement in hemoglobin and steroids    Pneumonia  Assessment & Plan  73-year-old male with PMH of CLL, A-fib, and HTN presented with cough  Completed full course of Augmentin as an outpatient  Seen by pulmonology while inpatient who did not recommend any ongoing antibiotic therapy  Relatively asymptomatic other than a lingering cough  Stable on room air  Continue to monitor clinically    Vitamin B 12 deficiency  Assessment & Plan  Restart B12 supplement    Long QT syndrome type 2  Assessment & Plan  Avoid QT prolonging agents  Continue home nadolol    Paroxysmal A-fib (HCC)  Assessment & Plan  Continue nadolol   CTA showing lack of opacification of the left atrial appendage, cannot r/o thrombus  Repeat echocardiogram again atrial scanning performed: No evidence of thrombus  Holding Pradaxa due to acute anemia and for bone marrow biopsy    Hyponatremia  Assessment & Plan  Acute on chronic  Improving with fluid restriction and p.o. Lasix  Continue to monitor daily BMP    Pulmonary hypertension (HCC)  Assessment & Plan  Severe pulmonary hypertension and tricuspid regurgitation noted on echocardiogram  Cardiology following    Essential hypertension  Assessment & Plan  BP controlled  Home meds nadolol, losartan and aldactone  Aldactone and losartan on hold given electrolyte issues           VTE Pharmacologic Prophylaxis:   Moderate Risk (Score 3-4) -  Pharmacological DVT Prophylaxis Contraindicated. Sequential Compression Devices Ordered.    Mobility:   Basic Mobility Inpatient Raw Score: 18  JH-HLM Goal: 6: Walk 10 steps or more  JH-HLM Achieved: 6: Walk 10 steps or more  JH-HLM Goal achieved. Continue to encourage appropriate mobility.    Patient Centered Rounds: I performed bedside rounds with nursing staff today.   Discussions with Specialists or Other Care Team Provider: LOUIS.  Nephrology.  Hematology oncology.    Education and Discussions with Family / Patient:  Wife at bedside, phone call with patient's son Shahid.     Total Time Spent on Date of Encounter in care of patient: 55 mins. This time was spent on one or more of the following: performing physical exam; counseling and coordination of care; obtaining or reviewing history; documenting in the medical record; reviewing/ordering tests, medications or procedures; communicating with other healthcare professionals and discussing with patient's family/caregivers.    Current Length of Stay: 6 day(s)  Current Patient Status: Inpatient   Certification Statement: The patient will continue to require additional inpatient hospital stay due to blood transfusion, serial lab monitoring, medication titration, dispo planning  Discharge Plan: Anticipate discharge in 24-48 hrs to home.    Code Status: Level 1 - Full Code    Subjective:   Patient seen and examined.  No new complaints today.  Is not happy about the low potassium diet.  Otherwise potassium now stable.  Hemoglobin again low.  WBC continues to rise.  Patient is anxious.    Objective:     Vitals:   Temp (24hrs), Av.8 °F (36.6 °C), Min:97.4 °F (36.3 °C), Max:98 °F (36.7 °C)    Temp:  [97.4 °F (36.3 °C)-98 °F (36.7 °C)] 97.9 °F (36.6 °C)  HR:  [70-76] 70  Resp:  [17-20] 18  BP: (119-152)/(63-77) 120/63  SpO2:  [93 %-95 %] 95 %  Body mass index is 29.03 kg/m².     Input and Output Summary (last 24 hours):     Intake/Output Summary (Last 24 hours) at 2024  1434  Last data filed at 9/8/2024 1321  Gross per 24 hour   Intake 1534.5 ml   Output 1400 ml   Net 134.5 ml       PHYSICAL EXAM:    Vitals signs reviewed  Constitutional   Awake and cooperative. NAD.   Head/Neck   Normocephalic. Atraumatic.   HEENT   No scleral icterus. EOMI.   Heart   Regular rate and rhythm. No murmers.   Lungs   Clear to auscultation bilaterally. Respirations unlaboured.   Abdomen   Soft. Nontender. Nondistended.    Skin   Skin color normal.    Extremities   No deformities. No peripheral edema.  Unchanged lacy appearing rash on his bilateral lower extremities   Neuro   Alert and oriented. No new deficits.   Psych   Mood stable. Affect normal.       Additional Data:     Labs:  Results from last 7 days   Lab Units 09/08/24  0851 09/08/24  0455 09/07/24  0920 09/04/24  0438 09/03/24  1007 09/02/24  0733   WBC Thousand/uL 240.15*   < > 171.50*   < > 125.72* 113.22*   HEMOGLOBIN g/dL 6.5*   < > 6.5*   < > 5.7* 8.7*   HEMATOCRIT % 22.0*   < > 22.0*   < > 16.4* 24.1*   PLATELETS Thousands/uL 265   < > 205   < > 304 347   BANDS PCT %  --   --   --   --   --  2   SEGS PCT %  --   --   --   --  6*  --    LYMPHO PCT %  --   --  90*   < > 93* 78*   MONO PCT %  --   --  1*   < > 1* 0*   EOS PCT %  --   --  0   < > 0 0    < > = values in this interval not displayed.     Results from last 7 days   Lab Units 09/08/24  0455 09/06/24  0617 09/06/24  0455   SODIUM mmol/L 133*   < > 127*   POTASSIUM mmol/L 4.6   < > 6.6*   CHLORIDE mmol/L 99   < > 99   CO2 mmol/L 29   < > 26   BUN mg/dL 22   < > 21   CREATININE mg/dL 0.77   < > 0.75   ANION GAP mmol/L 5   < > 2*   CALCIUM mg/dL 7.9*   < > 8.4   ALBUMIN g/dL  --   --  3.8   TOTAL BILIRUBIN mg/dL  --   --  1.22*   ALK PHOS U/L  --   --  79   ALT U/L  --   --  51   AST U/L  --   --  37   GLUCOSE RANDOM mg/dL 102   < > 153*    < > = values in this interval not displayed.         Results from last 7 days   Lab Units 09/06/24  1423 09/06/24  0620   POC GLUCOSE mg/dl 182*  153*         Results from last 7 days   Lab Units 09/03/24  0425 09/02/24  0733   PROCALCITONIN ng/ml 0.19 0.06       Lines/Drains:  Invasive Devices       Peripheral Intravenous Line  Duration             Peripheral IV 09/08/24 Left Antecubital <1 day                          Imaging: Reviewed radiology reports from this admission including: ultrasound(s) and ECHO    Recent Cultures (last 7 days):   Results from last 7 days   Lab Units 09/02/24  1655 09/02/24  1259   SPUTUM CULTURE  2+ Growth of  --    GRAM STAIN RESULT  1+ Epithelial cells per low power field*  2+ Gram positive cocci in pairs*  2+ Gram variable rods*  No polys seen*  --    LEGIONELLA URINARY ANTIGEN   --  Negative       Last 24 Hours Medication List:   Current Facility-Administered Medications   Medication Dose Route Frequency Provider Last Rate    acetaminophen  650 mg Oral Q6H PRN Cyril Nick MD      albuterol  2 puff Inhalation Q4H PRN Cyril Nick MD      aluminum-magnesium hydroxide-simethicone  30 mL Oral Q6H PRN Cyril Nick MD      ascorbic acid  250 mg Oral Daily GIAN RenteriaC      atorvastatin  20 mg Oral Daily With Dinner Cyril Nick MD      benzonatate  100 mg Oral TID PRN Cyril Nick MD      vitamin B-12  500 mcg Oral Daily Imelda Reno PA-C      fish oil  1,000 mg Oral Daily JESUSITA Renteria-MARKOS      furosemide  20 mg Oral Daily Titus Portillo, DO      guaiFENesin  600 mg Oral BID Cyril Nick MD      lidocaine  1 patch Topical Daily Fritz Cross PA-C      LORazepam  0.5 mg Oral Q8H PRN Titus Portillo, DO      montelukast  10 mg Oral Daily Cyril Nick MD      nadolol  80 mg Oral Early Morning Titus Portillo, DO      pantoprazole  40 mg Oral Early Morning Cyril Nick MD      polyethylene glycol  17 g Oral Daily PRN Missy Guevara PA-C      polyethylene glycol  17 g Oral Daily Titus Portillo, DO      predniSONE  60 mg Oral Daily Imelda Reno  PA-C      Followed by    [START ON 9/14/2024] predniSONE  40 mg Oral Daily Imelda Reno, PA-C      Followed by    [START ON 9/21/2024] predniSONE  20 mg Oral Daily Imelda Reno, PA-C      Followed by    [START ON 9/28/2024] predniSONE  10 mg Oral Daily Imelda Reno, PA-C      saccharomyces boulardii  250 mg Oral BID Imelda Reno, PA-C      senna  1 tablet Oral HS Titus Portillo DO      [START ON 9/9/2024] Sodium Zirconium Cyclosilicate  10 g Oral Every Other Day Hernando Blancas DO      trimethobenzamide  200 mg Intramuscular Q6H PRN Cyril Nick MD      zolpidem  5 mg Oral HS Cyril Nick MD          Today, Patient Was Seen By: Titus Portillo DO    **Please Note: This note may have been constructed using a voice recognition system.**

## 2024-09-08 NOTE — TELEMEDICINE
e-Consult (IPC)  - Interventional Radiology  Murphy Willams 73 y.o. male MRN: [id number]  Unit/Bed#: [place] [address] -01 Encounter: [id number]          Interventional Radiology has been consulted to evaluate Murphy Willams    We were consulted by medicine service concerning this patient with CLL.    Inpatient Consult to IR  Consult performed by: Tereso Gibbs MD  Consult ordered by: Titus Portillo,         09/08/24    Assessment/Recommendation:   73 year-old male with history of CLL now with increasing WBC now with concern for progression of CLL with worsening anemia, now with request for bone marrow biopsy.    We will plan for bone marrow biopsy early this week Mon vs Tues, provided IR and pathology scheduling availability. Please make patient NPO and contact IR tomorrow morning to confirm scheduling availability.    >5 min, >50% time spent reviewing/analyzing record, written report will be generated in the EMR.     Thank you for allowing Interventional Radiology to participate in the care of Murphy Willams. Please don't hesitate to call or TigerText us with any questions.     Tereso Gibbs MD

## 2024-09-09 ENCOUNTER — APPOINTMENT (INPATIENT)
Dept: RADIOLOGY | Facility: HOSPITAL | Age: 74
DRG: 841 | End: 2024-09-09
Attending: INTERNAL MEDICINE
Payer: MEDICARE

## 2024-09-09 DIAGNOSIS — K21.9 GASTROESOPHAGEAL REFLUX DISEASE: ICD-10-CM

## 2024-09-09 LAB
ABO GROUP BLD BPU: NORMAL
ANION GAP SERPL CALCULATED.3IONS-SCNC: 5 MMOL/L (ref 4–13)
ATRIAL RATE: 340 BPM
ATRIAL RATE: 69 BPM
ATRIAL RATE: 75 BPM
BASOPHILS # BLD AUTO: 0.06 THOUSANDS/ÂΜL (ref 0–0.1)
BASOPHILS NFR BLD AUTO: 0 % (ref 0–1)
BPU ID: NORMAL
BUN SERPL-MCNC: 20 MG/DL (ref 5–25)
CALCIUM SERPL-MCNC: 8.1 MG/DL (ref 8.4–10.2)
CHLORIDE SERPL-SCNC: 97 MMOL/L (ref 96–108)
CO2 SERPL-SCNC: 31 MMOL/L (ref 21–32)
CREAT SERPL-MCNC: 0.72 MG/DL (ref 0.6–1.3)
CROSSMATCH: NORMAL
EOSINOPHIL # BLD AUTO: 0 THOUSAND/ÂΜL (ref 0–0.61)
EOSINOPHIL NFR BLD AUTO: 0 % (ref 0–6)
GFR SERPL CREATININE-BSD FRML MDRD: 93 ML/MIN/1.73SQ M
GLUCOSE SERPL-MCNC: 113 MG/DL (ref 65–140)
HCT VFR BLD AUTO: 22.5 % (ref 36.5–49.3)
HGB BLD-MCNC: 7.6 G/DL (ref 12–17)
IMM GRANULOCYTES # BLD AUTO: 0.27 THOUSAND/UL (ref 0–0.2)
IMM GRANULOCYTES NFR BLD AUTO: 0 % (ref 0–2)
LYMPHOCYTES # BLD AUTO: >100 THOUSANDS/ÂΜL (ref 0.6–4.47)
LYMPHOCYTES NFR BLD AUTO: 94 % (ref 14–44)
MCH RBC QN AUTO: 35.7 PG (ref 26.8–34.3)
MCHC RBC AUTO-ENTMCNC: 33.8 G/DL (ref 31.4–37.4)
MCV RBC AUTO: 106 FL (ref 82–98)
MONOCYTES # BLD AUTO: 0.66 THOUSAND/ÂΜL (ref 0.17–1.22)
MONOCYTES NFR BLD AUTO: 1 % (ref 4–12)
NEUTROPHILS # BLD AUTO: 5.89 THOUSANDS/ÂΜL (ref 1.85–7.62)
NEUTS SEG NFR BLD AUTO: 5 % (ref 43–75)
NRBC BLD AUTO-RTO: 0 /100 WBCS
P AXIS: 110 DEGREES
P AXIS: 14 DEGREES
PLATELET # BLD AUTO: 176 THOUSANDS/UL (ref 149–390)
PMV BLD AUTO: 10.2 FL (ref 8.9–12.7)
POTASSIUM SERPL-SCNC: 4.7 MMOL/L (ref 3.5–5.3)
PR INTERVAL: 122 MS
PR INTERVAL: 122 MS
PR INTERVAL: 124 MS
QRS AXIS: -28 DEGREES
QRS AXIS: -30 DEGREES
QRS AXIS: -33 DEGREES
QRSD INTERVAL: 152 MS
QRSD INTERVAL: 154 MS
QRSD INTERVAL: 166 MS
QT INTERVAL: 510 MS
QT INTERVAL: 534 MS
QT INTERVAL: 540 MS
QTC INTERVAL: 569 MS
QTC INTERVAL: 578 MS
QTC INTERVAL: 580 MS
RBC # BLD AUTO: 2.13 MILLION/UL (ref 3.88–5.62)
SODIUM SERPL-SCNC: 133 MMOL/L (ref 135–147)
T WAVE AXIS: 118 DEGREES
T WAVE AXIS: 125 DEGREES
T WAVE AXIS: 84 DEGREES
UNIT DISPENSE STATUS: NORMAL
UNIT PRODUCT CODE: NORMAL
UNIT PRODUCT VOLUME: 350 ML
UNIT RH: NORMAL
VENTRICULAR RATE: 69 BPM
VENTRICULAR RATE: 71 BPM
VENTRICULAR RATE: 75 BPM
WBC # BLD AUTO: 132.1 THOUSAND/UL (ref 4.31–10.16)

## 2024-09-09 PROCEDURE — C1830 POWER BONE MARROW BX NEEDLE: HCPCS

## 2024-09-09 PROCEDURE — 93005 ELECTROCARDIOGRAM TRACING: CPT

## 2024-09-09 PROCEDURE — 88374 M/PHMTRC ALYS ISHQUANT/SEMIQ: CPT | Performed by: INTERNAL MEDICINE

## 2024-09-09 PROCEDURE — 99153 MOD SED SAME PHYS/QHP EA: CPT

## 2024-09-09 PROCEDURE — 85027 COMPLETE CBC AUTOMATED: CPT | Performed by: INTERNAL MEDICINE

## 2024-09-09 PROCEDURE — 99152 MOD SED SAME PHYS/QHP 5/>YRS: CPT

## 2024-09-09 PROCEDURE — 99232 SBSQ HOSP IP/OBS MODERATE 35: CPT | Performed by: INTERNAL MEDICINE

## 2024-09-09 PROCEDURE — 88185 FLOWCYTOMETRY/TC ADD-ON: CPT

## 2024-09-09 PROCEDURE — 07DR3ZX EXTRACTION OF ILIAC BONE MARROW, PERCUTANEOUS APPROACH, DIAGNOSTIC: ICD-10-PCS | Performed by: RADIOLOGY

## 2024-09-09 PROCEDURE — 93010 ELECTROCARDIOGRAM REPORT: CPT | Performed by: INTERNAL MEDICINE

## 2024-09-09 PROCEDURE — 88264 CHROMOSOME ANALYSIS 20-25: CPT | Performed by: INTERNAL MEDICINE

## 2024-09-09 PROCEDURE — 88184 FLOWCYTOMETRY/ TC 1 MARKER: CPT | Performed by: INTERNAL MEDICINE

## 2024-09-09 PROCEDURE — 77002 NEEDLE LOCALIZATION BY XRAY: CPT

## 2024-09-09 PROCEDURE — 38220 DX BONE MARROW ASPIRATIONS: CPT

## 2024-09-09 PROCEDURE — 88237 TISSUE CULTURE BONE MARROW: CPT | Performed by: INTERNAL MEDICINE

## 2024-09-09 PROCEDURE — 80048 BASIC METABOLIC PNL TOTAL CA: CPT | Performed by: INTERNAL MEDICINE

## 2024-09-09 RX ORDER — OMEPRAZOLE 40 MG/1
CAPSULE, DELAYED RELEASE ORAL
Qty: 90 CAPSULE | Refills: 1 | Status: SHIPPED | OUTPATIENT
Start: 2024-09-09

## 2024-09-09 RX ORDER — FENTANYL CITRATE 50 UG/ML
INJECTION, SOLUTION INTRAMUSCULAR; INTRAVENOUS AS NEEDED
Status: COMPLETED | OUTPATIENT
Start: 2024-09-09 | End: 2024-09-09

## 2024-09-09 RX ORDER — LIDOCAINE WITH 8.4% SOD BICARB 0.9%(10ML)
SYRINGE (ML) INJECTION AS NEEDED
Status: COMPLETED | OUTPATIENT
Start: 2024-09-09 | End: 2024-09-09

## 2024-09-09 RX ORDER — DABIGATRAN ETEXILATE 150 MG/1
150 CAPSULE ORAL EVERY 12 HOURS SCHEDULED
Status: DISCONTINUED | OUTPATIENT
Start: 2024-09-09 | End: 2024-09-12 | Stop reason: HOSPADM

## 2024-09-09 RX ORDER — MIDAZOLAM HYDROCHLORIDE 2 MG/2ML
INJECTION, SOLUTION INTRAMUSCULAR; INTRAVENOUS AS NEEDED
Status: COMPLETED | OUTPATIENT
Start: 2024-09-09 | End: 2024-09-09

## 2024-09-09 RX ADMIN — LORAZEPAM 0.5 MG: 0.5 TABLET ORAL at 19:32

## 2024-09-09 RX ADMIN — NADOLOL 80 MG: 20 TABLET ORAL at 05:03

## 2024-09-09 RX ADMIN — PANTOPRAZOLE SODIUM 40 MG: 40 TABLET, DELAYED RELEASE ORAL at 05:03

## 2024-09-09 RX ADMIN — FENTANYL CITRATE 25 MCG: 50 INJECTION INTRAMUSCULAR; INTRAVENOUS at 13:47

## 2024-09-09 RX ADMIN — SENNOSIDES 8.6 MG: 8.6 TABLET, FILM COATED ORAL at 21:30

## 2024-09-09 RX ADMIN — LIDOCAINE 1 PATCH: 700 PATCH TOPICAL at 10:00

## 2024-09-09 RX ADMIN — BENZONATATE 100 MG: 100 CAPSULE ORAL at 04:52

## 2024-09-09 RX ADMIN — CYANOCOBALAMIN TAB 500 MCG 500 MCG: 500 TAB at 09:50

## 2024-09-09 RX ADMIN — Medication 250 MG: at 17:17

## 2024-09-09 RX ADMIN — GUAIFENESIN 600 MG: 600 TABLET, EXTENDED RELEASE ORAL at 09:50

## 2024-09-09 RX ADMIN — SODIUM ZIRCONIUM CYCLOSILICATE 10 G: 10 POWDER, FOR SUSPENSION ORAL at 10:46

## 2024-09-09 RX ADMIN — ACETAMINOPHEN 650 MG: 325 TABLET ORAL at 04:52

## 2024-09-09 RX ADMIN — FENTANYL CITRATE 25 MCG: 50 INJECTION INTRAMUSCULAR; INTRAVENOUS at 13:52

## 2024-09-09 RX ADMIN — PREDNISONE 60 MG: 20 TABLET ORAL at 09:49

## 2024-09-09 RX ADMIN — OMEGA-3 FATTY ACIDS CAP 1000 MG 1000 MG: 1000 CAP at 09:49

## 2024-09-09 RX ADMIN — MONTELUKAST 10 MG: 10 TABLET, FILM COATED ORAL at 09:50

## 2024-09-09 RX ADMIN — BENZONATATE 100 MG: 100 CAPSULE ORAL at 21:35

## 2024-09-09 RX ADMIN — Medication 10 ML: at 13:54

## 2024-09-09 RX ADMIN — DABIGATRAN ETEXILATE MESYLATE 150 MG: 150 CAPSULE ORAL at 21:30

## 2024-09-09 RX ADMIN — MIDAZOLAM 0.5 MG: 1 INJECTION INTRAMUSCULAR; INTRAVENOUS at 13:52

## 2024-09-09 RX ADMIN — ATORVASTATIN CALCIUM 20 MG: 20 TABLET, FILM COATED ORAL at 17:16

## 2024-09-09 RX ADMIN — Medication 250 MG: at 09:49

## 2024-09-09 RX ADMIN — FENTANYL CITRATE 25 MCG: 50 INJECTION INTRAMUSCULAR; INTRAVENOUS at 13:44

## 2024-09-09 RX ADMIN — ZOLPIDEM TARTRATE 5 MG: 5 TABLET, COATED ORAL at 21:30

## 2024-09-09 RX ADMIN — GUAIFENESIN 600 MG: 600 TABLET, EXTENDED RELEASE ORAL at 21:30

## 2024-09-09 RX ADMIN — MIDAZOLAM 0.5 MG: 1 INJECTION INTRAMUSCULAR; INTRAVENOUS at 13:47

## 2024-09-09 RX ADMIN — MIDAZOLAM 0.5 MG: 1 INJECTION INTRAMUSCULAR; INTRAVENOUS at 13:43

## 2024-09-09 RX ADMIN — FUROSEMIDE 20 MG: 20 TABLET ORAL at 09:49

## 2024-09-09 NOTE — PROGRESS NOTES
[facility]  Progress Note  Name: Murphy Willams I  MRN: [id number]  Unit/Bed#: [place] [address] -01 I Date of Admission: 9/2/2024   Date of Service: 9/9/2024 I Hospital Day: 7    Assessment & Plan   Hyperkalemia  Assessment & Plan  Acute development of hyperkalemia on morning of 9/6 at 6.6  Has since been treated with insulin/dextrose/calcium/bicarb x 2    Continue on Lokelma 10 mg daily  Continue on p.o. Lasix 20 mg daily  Holding home losartan and spironolactone  Low potassium diet  Appreciate nephrology recommendations  Monitor daily BMP    Rash  Assessment & Plan  Lacy rash over bilateral lower extremities from the thighs to the ankle.  Mildly improved since admission though still prominent.  Developed a rash shortly after completing Augmentin therapy  Question autoimmune component given history of CLL with autoimmune hemolytic anemia  Continue to monitor with supportive care time  Monitor with improvement in hemoglobin and steroids    Pneumonia  Assessment & Plan  73-year-old male with PMH of CLL, A-fib, and HTN presented with cough  Completed full course of Augmentin as an outpatient  Seen by pulmonology while inpatient who did not recommend any ongoing antibiotic therapy  Relatively asymptomatic other than a lingering cough  Stable on room air  Continue to monitor clinically    Vitamin B 12 deficiency  Assessment & Plan  Restart B12 supplement    Long QT syndrome type 2  Assessment & Plan  Avoid QT prolonging agents  Continue home nadolol    Paroxysmal A-fib (HCC)  Assessment & Plan  Continue nadolol   CTA showing lack of opacification of the left atrial appendage, cannot r/o thrombus  Repeat echocardiogram again atrial scanning performed: No evidence of thrombus  On Pradaxa.    Hyponatremia  Assessment & Plan  Acute on chronic  Improving with fluid restriction and p.o. Lasix  Continue to monitor daily BMP    Pulmonary hypertension (HCC)  Assessment & Plan  Severe pulmonary  hypertension and tricuspid regurgitation noted on echocardiogram  Cardiology following    CLL (chronic lymphocytic leukemia) (HCC)  Assessment & Plan  Currently follow with Dr. Hanley, not on any current medications  WBC has been significantly elevated as high as 138,000 this hospitalization  Could be reactive in the setting of recent pneumonia, however cannot rule out progression of CLL    WBC is down to 120,000 today.  Follow-up flow cytometry  IR consulted for bone marrow biopsy  Monitor daily CBC  Follow-up any additional oncology recommendations  Outpatient follow-up with Dr. Hanley    Essential hypertension  Assessment & Plan  BP controlled  Home meds nadolol, losartan and aldactone  Aldactone and losartan on hold given electrolyte issues    * Anemia  Assessment & Plan  Patient presented to the hospital with worsening cough and shortness of breath.  He is recently treated for pneumonia.  In addition was found to have acute anemia with at 8.7 on admission, and 5.7 the following day.  Has a distant history of autoimmune hemolytic anemia    Appreciate hematology/oncology recommendations  hemolysis workup generally unremarkable despite positive Narciso test  Given prior history of hemolytic anemia, hematology has recommended treating with steroids.  He is currently on prednisone taper.  Transfused total of 3 units PRBCs this hospitalization and hemoglobin now at 7.3 g  He has no signs or symptoms of significant bleeding and has been restarted on his home Pradaxa: Currently on hold for bone marrow biopsy.  The patient's hemoglobin has improved today.  Unclear on there including etiology though with concomitant rise in WBC concerns for active leukemia.  Flow cytometry pending  Bone marrow biopsy done today.               Subjective:  Patient feels somewhat better overall.  He does have a cough and is bringing up some discolored sputum at the moment.  He has no shortness of breath or chest pain.  He has no abdominal  pain, nausea, or vomiting.  He is getting a bone marrow biopsy today.    Physical Exam:   Temp:  [97.3 °F (36.3 °C)-98.4 °F (36.9 °C)] 97.3 °F (36.3 °C)  HR:  [70-79] 72  Resp:  [16-26] 16  BP: (125-185)/(63-98) 135/69    Gen: Developed, well-nourished, in no distress.  Neck: Supple.  No lymphadenopathy, goiter, or bruit.  Heart: Regular rhythm.  I heard no murmur or gallop.  Lungs: Clear to auscultation and percussion.  No wheezing or rhonchi.  Abd: Soft with active bowel sounds.  No mass or tenderness.  Extremities: No clubbing, cyanosis, or edema.  No calf tenderness.  Neuro: Alert and oriented.  No focal sign.  Skin: Warm and dry.      LABS:   CBC:   Lab Results   Component Value Date    .10 (H) 09/09/2024    HGB 7.6 (L) 09/09/2024    HCT 22.5 (L) 09/09/2024     (H) 09/09/2024     09/09/2024    RBC 2.13 (L) 09/09/2024    MCH 35.7 (H) 09/09/2024    MCHC 33.8 09/09/2024    MPV 10.2 09/09/2024    NRBC 0 09/09/2024   , CMP:   Lab Results   Component Value Date    SODIUM 133 (L) 09/09/2024    K 4.7 09/09/2024    CL 97 09/09/2024    CO2 31 09/09/2024    BUN 20 09/09/2024    CREATININE 0.72 09/09/2024    CALCIUM 8.1 (L) 09/09/2024    EGFR 93 09/09/2024             VTE Pharmacologic Prophylaxis: Pradaxa  VTE Mechanical Prophylaxis: reason for no mechanical VTE prophylaxis therapeutically anticoagulated

## 2024-09-09 NOTE — PLAN OF CARE
Problem: PAIN - ADULT  Goal: Verbalizes/displays adequate comfort level or baseline comfort level  Description: Interventions:  - Encourage patient to monitor pain and request assistance  - Assess pain using appropriate pain scale  - Administer analgesics based on type and severity of pain and evaluate response  - Implement non-pharmacological measures as appropriate and evaluate response  - Consider cultural and social influences on pain and pain management  - Notify physician/advanced practitioner if interventions unsuccessful or patient reports new pain  Outcome: Progressing     Problem: INFECTION - ADULT  Goal: Absence or prevention of progression during hospitalization  Description: INTERVENTIONS:  - Assess and monitor for signs and symptoms of infection  - Monitor lab/diagnostic results  - Monitor all insertion sites, i.e. indwelling lines, tubes, and drains  - Monitor endotracheal if appropriate and nasal secretions for changes in amount and color  - [place] appropriate cooling/warming therapies per order  - Administer medications as ordered  - Instruct and encourage patient and family to use good hand hygiene technique  - Identify and instruct in appropriate isolation precautions for identified infection/condition  Outcome: Progressing     Problem: INFECTION - ADULT  Goal: Absence of fever/infection during neutropenic period  Description: INTERVENTIONS:  - Monitor WBC    Outcome: Progressing

## 2024-09-09 NOTE — PROGRESS NOTES
NEPHROLOGY PROGRESS NOTE   Murphy Willams 73 y.o. male MRN: [id number]  Unit/Bed#: [place] [address] -01 Encounter: [id number]  Reason for Consult: hyperkalemia    ASSESSMENT AND PLAN:  Initial hyperkalemia  -Suspected in the setting of taking ARB, spironolactone, also concern for hemolytic anemia, CLL  -Continue to remain off offending medications including ARB, spironolactone.  -Potassium level has much improved and now remains stable 4.7 today  -Currently on Lokelma 10 g every other day, Lasix 20 mg daily.  -Recommend potassium restricted diet when allowed, currently remains n.p.o. in anticipation of procedure.  -Repeat BMP in AM.    Hyponatremia  -Suspected some element of hypervolemia given cardiomyopathy, severe TR  -Serum sodium slowly improving 133 today.  -Currently remains n.p.o., consider fluid restriction 1.2 L/day when allowed to have diet  -Monitor with current diuretics.    Volume status, echo shows EF 55%, severe pulmonary hypertension, severe TR, IVC not well-visualized.  -Currently remains on room air.  -Continue Lasix 20 mg daily.    CLL with severe anemia, mild leukocytosis, hematology following.  Plan noted for bone marrow biopsy by IR today.    Severe anemia, initially suspected hemolytic anemia, currently on steroids as per hematology.  -Transfuse as needed for hemoglobin less than 7.  -Hemoglobin improving 7.6 today.    Discussed above plan in detail with primary team regarding monitoring BMP, potassium and fluid restriction as above, continue Lokelma, Lasix and they agree with above recommendations.      SUBJECTIVE:  Patient seen and examined at bedside.  Patient concerned of having yellowish sputum production.  Denies any worsening shortness of breath.  Currently remains on room air    OBJECTIVE:  Current Weight: Weight - Scale: 86.6 kg (190 lb 14.7 oz)  Vitals:    09/09/24 0710   BP: 127/66   Pulse: 71   Resp: (!) 26   Temp: 97.7 °F (36.5 °C)   SpO2: 92%       Intake/Output Summary (Last 24  hours) at 9/9/2024 0913  Last data filed at 9/9/2024 0524  Gross per 24 hour   Intake 1430 ml   Output 600 ml   Net 830 ml     Wt Readings from Last 3 Encounters:   09/03/24 86.6 kg (190 lb 14.7 oz)   08/28/24 86.6 kg (191 lb)   08/20/24 87 kg (191 lb 12.8 oz)     Temp Readings from Last 3 Encounters:   09/09/24 97.7 °F (36.5 °C) (Oral)   08/28/24 98.4 °F (36.9 °C) (Tympanic)   08/20/24 98.6 °F (37 °C) (Tympanic)     BP Readings from Last 3 Encounters:   09/09/24 127/66   08/28/24 102/62   08/20/24 136/72     Pulse Readings from Last 3 Encounters:   09/09/24 71   08/28/24 93   08/20/24 87        Physical Examination:  Eyes: Mild conjunctival pallor present  Respiratory: Bilateral air entry present, coarse breath sounds with occasional rhonchi present  CVS: No significant edema  GI: Soft, nondistended  CNS: Active, alert, follows commands  Skin: No new rash  Musculoskeletal: No obvious new gross deformity noted    Medications:    Current Facility-Administered Medications:     acetaminophen (TYLENOL) tablet 650 mg, 650 mg, Oral, Q6H PRN, Cyril Nick MD, 650 mg at 09/09/24 0452    albuterol (PROVENTIL HFA,VENTOLIN HFA) inhaler 2 puff, 2 puff, Inhalation, Q4H PRN, Cyril Nick MD    aluminum-magnesium hydroxide-simethicone (MAALOX) oral suspension 30 mL, 30 mL, Oral, Q6H PRN, Cyril Nick MD, 30 mL at 09/03/24 1924    ascorbic acid (VITAMIN C) tablet 250 mg, 250 mg, Oral, Daily, Imelda Reno PA-C, 250 mg at 09/08/24 0910    atorvastatin (LIPITOR) tablet 20 mg, 20 mg, Oral, Daily With Dinner, Cyril Nick MD, 20 mg at 09/08/24 1631    benzonatate (TESSALON PERLES) capsule 100 mg, 100 mg, Oral, TID PRN, Cyril Nick MD, 100 mg at 09/09/24 0452    cyanocobalamin (VITAMIN B-12) tablet 500 mcg, 500 mcg, Oral, Daily, Imelda Reno PA-C, 500 mcg at 09/08/24 0910    fish oil capsule 1,000 mg, 1,000 mg, Oral, Daily, Imelda Reno PA-C, 1,000 mg at 09/08/24 0910    furosemide (LASIX) tablet 20 mg, 20  mg, Oral, Daily, Titus Portillo DO, 20 mg at 09/08/24 0910    guaiFENesin (MUCINEX) 12 hr tablet 600 mg, 600 mg, Oral, BID, Cyril Nick MD, 600 mg at 09/08/24 2122    lidocaine (LIDODERM) 5 % patch 1 patch, 1 patch, Topical, Daily, Fritz Cross PA-C, 1 patch at 09/08/24 0911    LORazepam (ATIVAN) tablet 0.5 mg, 0.5 mg, Oral, Q8H PRN, Titus Portillo DO, 0.5 mg at 09/08/24 2122    montelukast (SINGULAIR) tablet 10 mg, 10 mg, Oral, Daily, Cyril Nick MD, 10 mg at 09/08/24 0910    nadolol (CORGARD) tablet 80 mg, 80 mg, Oral, Early Morning, Titus Portillo DO, 80 mg at 09/09/24 0503    pantoprazole (PROTONIX) EC tablet 40 mg, 40 mg, Oral, Early Morning, Cyril Nick MD, 40 mg at 09/09/24 0503    polyethylene glycol (MIRALAX) packet 17 g, 17 g, Oral, Daily PRN, Missy Guevara PA-C, 17 g at 09/07/24 2117    polyethylene glycol (MIRALAX) packet 17 g, 17 g, Oral, Daily, Titus Portillo DO, 17 g at 09/08/24 0911    predniSONE tablet 60 mg, 60 mg, Oral, Daily, 60 mg at 09/08/24 0910 **FOLLOWED BY** [START ON 9/14/2024] predniSONE tablet 40 mg, 40 mg, Oral, Daily **FOLLOWED BY** [START ON 9/21/2024] predniSONE tablet 20 mg, 20 mg, Oral, Daily **FOLLOWED BY** [START ON 9/28/2024] predniSONE tablet 10 mg, 10 mg, Oral, Daily, Imelda Reno PA-C    saccharomyces boulardii (FLORASTOR) capsule 250 mg, 250 mg, Oral, BID, Imelda Reno PA-C, 250 mg at 09/08/24 1631    senna (SENOKOT) tablet 8.6 mg, 1 tablet, Oral, HS, Titus Portillo, DO, 8.6 mg at 09/08/24 2122    Sodium Zirconium Cyclosilicate (Lokelma) 10 g, 10 g, Oral, Every Other Day, Hernando lBancas DO    trimethobenzamide (TIGAN) IM injection 200 mg, 200 mg, Intramuscular, Q6H PRN, Cyril Nick MD, 200 mg at 09/02/24 1806    zolpidem (AMBIEN) tablet 5 mg, 5 mg, Oral, HS, Cyril Nick MD, 5 mg at 09/08/24 2122    Laboratory Results:  Results from last 7 days   Lab Units 09/09/24  0421 09/08/24  0851  09/08/24  0455 09/07/24  0920 09/07/24  0539 09/06/24  1218 09/06/24  0617 09/06/24  0455 09/05/24  0400 09/04/24  0438   WBC Thousand/uL 132.10* 240.15* 232.30* 171.50*  --   --   --  138.64* 125.54* 128.05*   HEMOGLOBIN g/dL 7.6* 6.5* 11.0* 6.5*  --   --   --  7.3* 6.3* 6.4*   HEMATOCRIT % 22.5* 22.0* 30.0* 22.0*  --   --   --  22.6* 19.1* 18.9*   PLATELETS Thousands/uL 176 265 340 205  --   --   --  255 265 312   SODIUM mmol/L 133*  --  133*  --  130* 128*  --  127* 128* 132*   POTASSIUM mmol/L 4.7  --  4.6  --  5.2 5.6* 6.3* 6.6* 5.2 4.9   CHLORIDE mmol/L 97  --  99  --  98 98  --  99 98 100   CO2 mmol/L 31  --  29  --  28 26  --  26 23 24   BUN mg/dL 20  --  22  --  24 24  --  21 17 20   CREATININE mg/dL 0.72  --  0.77  --  0.86 0.78  --  0.75 0.77 0.87   CALCIUM mg/dL 8.1*  --  7.9*  --  8.7 8.8  --  8.4 8.4 8.5       CT cardiac w pulmonary vein mapping   Final Result by Walker Hensley MD (09/04 1548)      No thrombus in the left atrial appendage.            Workstation performed: [id number]         US abdomen complete   Final Result by Emir Krueger MD (09/04 1122)      Mild hepatomegaly.. Possible 2 mm hemangioma.   Moderate splenomegaly.   Gallstone in the gallbladder.               Workstation performed: [id number]         CTA ED chest PE study   Final Result by Katia Patel MD (09/02 0841)      No pulmonary embolus.      Right middle lobe pneumonia.      Lack of opacification of the left atrial appendage. It cannot be determined if this is due to nonopacified blood in the nondependent portion of the left atrium or left atrial appendage thrombus.      Mild splenomegaly and minimally enlarged right axillary node, likely due to the patient's history of CLL.      The study was marked in EPIC for immediate notification.            Workstation performed: [id number]         IR biopsy bone marrow    (Results Pending)       Portions of the record may have been created with voice recognition  "software. Occasional wrong word or \"sound a like\" substitutions may have occurred due to the inherent limitations of voice recognition software. Read the chart carefully and recognize, using context, where substitutions have occurred.    "

## 2024-09-09 NOTE — PLAN OF CARE
Problem: PAIN - ADULT  Goal: Verbalizes/displays adequate comfort level or baseline comfort level  Description: Interventions:  - Encourage patient to monitor pain and request assistance  - Assess pain using appropriate pain scale  - Administer analgesics based on type and severity of pain and evaluate response  - Implement non-pharmacological measures as appropriate and evaluate response  - Consider cultural and social influences on pain and pain management  - Notify physician/advanced practitioner if interventions unsuccessful or patient reports new pain  Outcome: Progressing     Problem: INFECTION - ADULT  Goal: Absence or prevention of progression during hospitalization  Description: INTERVENTIONS:  - Assess and monitor for signs and symptoms of infection  - Monitor lab/diagnostic results  - Monitor all insertion sites, i.e. indwelling lines, tubes, and drains  - Monitor endotracheal if appropriate and nasal secretions for changes in amount and color  - [place] appropriate cooling/warming therapies per order  - Administer medications as ordered  - Instruct and encourage patient and family to use good hand hygiene technique  - Identify and instruct in appropriate isolation precautions for identified infection/condition  Outcome: Progressing  Goal: Absence of fever/infection during neutropenic period  Description: INTERVENTIONS:  - Monitor WBC    Outcome: Progressing     Problem: SAFETY ADULT  Goal: Patient will remain free of falls  Description: INTERVENTIONS:  - Educate patient/family on patient safety including physical limitations  - Instruct patient to call for assistance with activity   - Consult OT/PT to assist with strengthening/mobility   - Keep Call bell within reach  - Keep bed low and locked with side rails adjusted as appropriate  - Keep care items and personal belongings within reach  - Initiate and maintain comfort rounds  - Make Fall Risk Sign visible to staff  - Apply yellow socks and bracelet  for high fall risk patients  - Consider moving patient to room near nurses station  Outcome: Progressing  Goal: Maintain or return to baseline ADL function  Description: INTERVENTIONS:  -  Assess patient's ability to carry out ADLs; assess patient's baseline for ADL function and identify physical deficits which impact ability to perform ADLs (bathing, care of mouth/teeth, toileting, grooming, dressing, etc.)  - Assess/evaluate cause of self-care deficits   - Assess range of motion  - Assess patient's mobility; develop plan if impaired  - Assess patient's need for assistive devices and provide as appropriate  - Encourage maximum independence but intervene and supervise when necessary  - Involve family in performance of ADLs  - Assess for home care needs following discharge   - Consider OT consult to assist with ADL evaluation and planning for discharge  - Provide patient education as appropriate  Outcome: Progressing  Goal: Maintains/Returns to pre admission functional level  Description: INTERVENTIONS:  - Perform AM-PAC 6 Click Basic Mobility/ Daily Activity assessment daily.  - Set and communicate daily mobility goal to care team and patient/family/caregiver.   - Collaborate with rehabilitation services on mobility goals if consulted  - Perform Range of Motion 3 times a day.  - Reposition patient every 2 hours.  - Dangle patient 3 times a day  - Stand patient 3 times a day  - Ambulate patient 3 times a day  - Out of bed to chair 3 times a day   - Out of bed for meals 3 times a day  - Out of bed for toileting  - Record patient progress and toleration of activity level   Outcome: Progressing     Problem: DISCHARGE PLANNING  Goal: Discharge to home or other facility with appropriate resources  Description: INTERVENTIONS:  - Identify barriers to discharge w/patient and caregiver  - Arrange for needed discharge resources and transportation as appropriate  - Identify discharge learning needs (meds, wound care, etc.)  -  Arrange for interpretive services to assist at discharge as needed  - Refer to Case Management Department for coordinating discharge planning if the patient needs post-hospital services based on physician/advanced practitioner order or complex needs related to functional status, cognitive ability, or social support system  Outcome: Progressing     Problem: Knowledge Deficit  Goal: Patient/family/caregiver demonstrates understanding of disease process, treatment plan, medications, and discharge instructions  Description: Complete learning assessment and assess knowledge base.  Interventions:  - Provide teaching at level of understanding  - Provide teaching via preferred learning methods  Outcome: Progressing

## 2024-09-10 ENCOUNTER — TELEPHONE (OUTPATIENT)
Age: 74
End: 2024-09-10

## 2024-09-10 LAB
ANION GAP SERPL CALCULATED.3IONS-SCNC: 5 MMOL/L (ref 4–13)
ANISOCYTOSIS BLD QL SMEAR: PRESENT
BASOPHILS # BLD MANUAL: 0 THOUSAND/UL (ref 0–0.1)
BASOPHILS NFR MAR MANUAL: 0 % (ref 0–1)
BUN SERPL-MCNC: 21 MG/DL (ref 5–25)
CALCIUM SERPL-MCNC: 7.8 MG/DL (ref 8.4–10.2)
CHLORIDE SERPL-SCNC: 98 MMOL/L (ref 96–108)
CO2 SERPL-SCNC: 29 MMOL/L (ref 21–32)
CREAT SERPL-MCNC: 0.63 MG/DL (ref 0.6–1.3)
EOSINOPHIL # BLD MANUAL: 0 THOUSAND/UL (ref 0–0.4)
EOSINOPHIL NFR BLD MANUAL: 0 % (ref 0–6)
ERYTHROCYTE [DISTWIDTH] IN BLOOD BY AUTOMATED COUNT: 23.2 % (ref 11.6–15.1)
GFR SERPL CREATININE-BSD FRML MDRD: 99 ML/MIN/1.73SQ M
GLUCOSE SERPL-MCNC: 130 MG/DL (ref 65–140)
HBV CORE AB SER QL: NORMAL
HBV SURFACE AB SER-ACNC: 9.7 MIU/ML
HBV SURFACE AG SER QL: NORMAL
HCT VFR BLD AUTO: 26.9 % (ref 36.5–49.3)
HGB BLD-MCNC: 8.7 G/DL (ref 12–17)
LYMPHOCYTES # BLD AUTO: 157.09 THOUSAND/UL (ref 0.6–4.47)
LYMPHOCYTES # BLD AUTO: 95 % (ref 14–44)
MACROCYTES BLD QL AUTO: PRESENT
MCH RBC QN AUTO: 32.8 PG (ref 26.8–34.3)
MCHC RBC AUTO-ENTMCNC: 32.3 G/DL (ref 31.4–37.4)
MCV RBC AUTO: 102 FL (ref 82–98)
MONOCYTES # BLD AUTO: 0 THOUSAND/UL (ref 0–1.22)
MONOCYTES NFR BLD: 0 % (ref 4–12)
NEUTROPHILS # BLD MANUAL: 8.27 THOUSAND/UL (ref 1.85–7.62)
NEUTS SEG NFR BLD AUTO: 5 % (ref 43–75)
PLATELET # BLD AUTO: 239 THOUSANDS/UL (ref 149–390)
PLATELET BLD QL SMEAR: ADEQUATE
PMV BLD AUTO: 10 FL (ref 8.9–12.7)
POTASSIUM SERPL-SCNC: 4.1 MMOL/L (ref 3.5–5.3)
RBC # BLD AUTO: 2.65 MILLION/UL (ref 3.88–5.62)
RBC MORPH BLD: PRESENT
SODIUM SERPL-SCNC: 132 MMOL/L (ref 135–147)
WBC # BLD AUTO: 165.36 THOUSAND/UL (ref 4.31–10.16)

## 2024-09-10 PROCEDURE — 99232 SBSQ HOSP IP/OBS MODERATE 35: CPT | Performed by: INTERNAL MEDICINE

## 2024-09-10 PROCEDURE — 86706 HEP B SURFACE ANTIBODY: CPT | Performed by: INTERNAL MEDICINE

## 2024-09-10 PROCEDURE — 87340 HEPATITIS B SURFACE AG IA: CPT | Performed by: INTERNAL MEDICINE

## 2024-09-10 PROCEDURE — 85027 COMPLETE CBC AUTOMATED: CPT | Performed by: INTERNAL MEDICINE

## 2024-09-10 PROCEDURE — 80048 BASIC METABOLIC PNL TOTAL CA: CPT | Performed by: INTERNAL MEDICINE

## 2024-09-10 PROCEDURE — 85007 BL SMEAR W/DIFF WBC COUNT: CPT | Performed by: INTERNAL MEDICINE

## 2024-09-10 PROCEDURE — 86704 HEP B CORE ANTIBODY TOTAL: CPT | Performed by: INTERNAL MEDICINE

## 2024-09-10 RX ORDER — ACETAMINOPHEN 325 MG/1
650 TABLET ORAL ONCE
OUTPATIENT
Start: 2024-09-26

## 2024-09-10 RX ORDER — SODIUM CHLORIDE 9 MG/ML
20 INJECTION, SOLUTION INTRAVENOUS ONCE
OUTPATIENT
Start: 2024-09-26

## 2024-09-10 RX ORDER — LORATADINE 10 MG/1
10 TABLET ORAL DAILY PRN
Status: DISCONTINUED | OUTPATIENT
Start: 2024-09-10 | End: 2024-09-11

## 2024-09-10 RX ORDER — ACETAMINOPHEN 325 MG/1
650 TABLET ORAL ONCE
Status: CANCELLED | OUTPATIENT
Start: 2024-09-18

## 2024-09-10 RX ORDER — ACETAMINOPHEN 325 MG/1
650 TABLET ORAL ONCE
Status: COMPLETED | OUTPATIENT
Start: 2024-09-10 | End: 2024-09-10

## 2024-09-10 RX ORDER — SODIUM CHLORIDE 9 MG/ML
20 INJECTION, SOLUTION INTRAVENOUS ONCE
Status: COMPLETED | OUTPATIENT
Start: 2024-09-10 | End: 2024-09-11

## 2024-09-10 RX ORDER — LIDOCAINE 50 MG/G
1 PATCH TOPICAL DAILY
Status: DISCONTINUED | OUTPATIENT
Start: 2024-09-10 | End: 2024-09-10 | Stop reason: SDUPTHER

## 2024-09-10 RX ORDER — SODIUM CHLORIDE 9 MG/ML
20 INJECTION, SOLUTION INTRAVENOUS ONCE
Status: CANCELLED | OUTPATIENT
Start: 2024-09-18

## 2024-09-10 RX ADMIN — DABIGATRAN ETEXILATE MESYLATE 150 MG: 150 CAPSULE ORAL at 08:49

## 2024-09-10 RX ADMIN — SODIUM CHLORIDE 20 ML/HR: 0.9 INJECTION, SOLUTION INTRAVENOUS at 23:57

## 2024-09-10 RX ADMIN — NADOLOL 80 MG: 20 TABLET ORAL at 05:20

## 2024-09-10 RX ADMIN — GUAIFENESIN 600 MG: 600 TABLET, EXTENDED RELEASE ORAL at 22:28

## 2024-09-10 RX ADMIN — OMEGA-3 FATTY ACIDS CAP 1000 MG 1000 MG: 1000 CAP at 08:49

## 2024-09-10 RX ADMIN — DABIGATRAN ETEXILATE MESYLATE 150 MG: 150 CAPSULE ORAL at 22:28

## 2024-09-10 RX ADMIN — Medication 250 MG: at 17:54

## 2024-09-10 RX ADMIN — ACETAMINOPHEN 650 MG: 325 TABLET ORAL at 23:52

## 2024-09-10 RX ADMIN — SENNOSIDES 8.6 MG: 8.6 TABLET, FILM COATED ORAL at 22:27

## 2024-09-10 RX ADMIN — FUROSEMIDE 20 MG: 20 TABLET ORAL at 08:49

## 2024-09-10 RX ADMIN — POLYETHYLENE GLYCOL 3350 17 G: 17 POWDER, FOR SOLUTION ORAL at 08:50

## 2024-09-10 RX ADMIN — PREDNISONE 60 MG: 20 TABLET ORAL at 08:49

## 2024-09-10 RX ADMIN — LIDOCAINE 1 PATCH: 50 PATCH CUTANEOUS at 22:31

## 2024-09-10 RX ADMIN — GUAIFENESIN 600 MG: 600 TABLET, EXTENDED RELEASE ORAL at 08:49

## 2024-09-10 RX ADMIN — LORATADINE 10 MG: 10 TABLET ORAL at 23:52

## 2024-09-10 RX ADMIN — ATORVASTATIN CALCIUM 20 MG: 20 TABLET, FILM COATED ORAL at 16:28

## 2024-09-10 RX ADMIN — Medication 250 MG: at 08:49

## 2024-09-10 RX ADMIN — CYANOCOBALAMIN TAB 500 MCG 500 MCG: 500 TAB at 08:49

## 2024-09-10 RX ADMIN — PANTOPRAZOLE SODIUM 40 MG: 40 TABLET, DELAYED RELEASE ORAL at 05:20

## 2024-09-10 RX ADMIN — LIDOCAINE 1 PATCH: 700 PATCH TOPICAL at 08:49

## 2024-09-10 RX ADMIN — LORAZEPAM 0.5 MG: 0.5 TABLET ORAL at 22:28

## 2024-09-10 RX ADMIN — MONTELUKAST 10 MG: 10 TABLET, FILM COATED ORAL at 08:49

## 2024-09-10 NOTE — TELEPHONE ENCOUNTER
Spoke with Dr. Garcia  Email sent: Can you send an email asking for inpt chemo clearance for obinitumumab in the inpt setting at [facility] to start ASAP. I can put beacon order in once I'm at [facility]. Driving now

## 2024-09-10 NOTE — PROGRESS NOTES
NEPHROLOGY PROGRESS NOTE   Murphy Willams 73 y.o. male MRN: [id number]  Unit/Bed#: [place] [address] -01 Encounter: [id number]  Reason for Consult: hyperkalemia    ASSESSMENT AND PLAN:  Assessment & Plan  Hyperkalemia  -Suspected in the setting of taking ARB, spironolactone, also concern for hemolytic anemia, CLL  -Continue to remain off offending medications including ARB, spironolactone.  -Potassium level has much improved and now remains stable 4.1 today.  -Currently on Lokelma 10 g every other day, Lasix 20 mg daily.  -Discussed potassium restricted diet although he does not want to follow restricted diet.  -Repeat BMP in AM.  Hyponatremia  -Suspected some element of hypervolemia given cardiomyopathy, severe TR  -Serum sodium relatively stable 132 today.  -Patient refuses to follow fluid restriction and wants to only continue regular diet.  Again recommended to try to follow fluid restriction which he says he will try without having order for fluid restriction.  -Monitor with current diuretics.  CLL (chronic lymphocytic leukemia) (HCC)  CLL with severe anemia, mild leukocytosis, hematology following.  Status post bone marrow biopsy on 9/9/2024, results to follow.  Pulmonary hypertension (HCC)  Volume status, echo shows EF 55%, severe pulmonary hypertension, severe TR, IVC not well-visualized.  -Currently remains on room air.  -Continue Lasix 20 mg daily.  Anemia  Severe anemia, initially suspected hemolytic anemia, currently on steroids as per hematology.  -Transfuse as needed for hemoglobin less than 7.  -Hemoglobin improving 7.6 yesterday.  Continue to monitor    Discussed above plan in detail with primary team regarding monitoring BMP, sodium, potassium, continue current diuretics and they agree with above recommendations.      SUBJECTIVE:  Patient seen and examined at bedside.  Denies any worsening shortness of breath, nausea or vomiting.    OBJECTIVE:  Current Weight: Weight - Scale: 86.6 kg (190 lb 14.7  oz)  Vitals:    09/10/24 0730   BP: 132/70   Pulse: 72   Resp: 18   Temp: 97.7 °F (36.5 °C)   SpO2: 94%       Intake/Output Summary (Last 24 hours) at 9/10/2024 0918  Last data filed at 9/10/2024 0711  Gross per 24 hour   Intake 1560 ml   Output 225 ml   Net 1335 ml     Wt Readings from Last 3 Encounters:   09/03/24 86.6 kg (190 lb 14.7 oz)   08/28/24 86.6 kg (191 lb)   08/20/24 87 kg (191 lb 12.8 oz)     Temp Readings from Last 3 Encounters:   09/10/24 97.7 °F (36.5 °C) (Oral)   08/28/24 98.4 °F (36.9 °C) (Tympanic)   08/20/24 98.6 °F (37 °C) (Tympanic)     BP Readings from Last 3 Encounters:   09/10/24 132/70   08/28/24 102/62   08/20/24 136/72     Pulse Readings from Last 3 Encounters:   09/10/24 72   08/28/24 93   08/20/24 87        Physical Examination:  Eyes: Mild conjunctival pallor present  Respiratory: Bilateral air entry present  CVS: No significant edema  GI: Soft, nondistended  CNS: Active, alert, follows commands  Skin: No new rash  Musculoskeletal: No obvious new gross deformity noted    Medications:    Current Facility-Administered Medications:     acetaminophen (TYLENOL) tablet 650 mg, 650 mg, Oral, Q6H PRN, Cyril Nick MD, 650 mg at 09/09/24 0452    albuterol (PROVENTIL HFA,VENTOLIN HFA) inhaler 2 puff, 2 puff, Inhalation, Q4H PRN, Cyril Nick MD    aluminum-magnesium hydroxide-simethicone (MAALOX) oral suspension 30 mL, 30 mL, Oral, Q6H PRN, Cyril Nick MD, 30 mL at 09/03/24 1924    ascorbic acid (VITAMIN C) tablet 250 mg, 250 mg, Oral, Daily, Imelda Reno PA-C, 250 mg at 09/10/24 0849    atorvastatin (LIPITOR) tablet 20 mg, 20 mg, Oral, Daily With Dinner, Cyril Nick MD, 20 mg at 09/09/24 1716    benzonatate (TESSALON PERLES) capsule 100 mg, 100 mg, Oral, TID PRN, Cyril Nick MD, 100 mg at 09/09/24 2135    cyanocobalamin (VITAMIN B-12) tablet 500 mcg, 500 mcg, Oral, Daily, Imelda Reno PA-C, 500 mcg at 09/10/24 0849    dabigatran etexilate (PRADAXA) capsule 150 mg,  150 mg, Oral, Q12H CHET, Bhavesh Jarvis MD, 150 mg at 09/10/24 0849    fish oil capsule 1,000 mg, 1,000 mg, Oral, Daily, Imelda Reno PA-C, 1,000 mg at 09/10/24 0849    furosemide (LASIX) tablet 20 mg, 20 mg, Oral, Daily, Titus Portillo DO, 20 mg at 09/10/24 0849    guaiFENesin (MUCINEX) 12 hr tablet 600 mg, 600 mg, Oral, BID, Cyril Nick MD, 600 mg at 09/10/24 0849    lidocaine (LIDODERM) 5 % patch 1 patch, 1 patch, Topical, Daily, Fritz Cross PA-C, 1 patch at 09/10/24 0849    LORazepam (ATIVAN) tablet 0.5 mg, 0.5 mg, Oral, Q8H PRN, Titus Portillo DO, 0.5 mg at 09/09/24 1932    montelukast (SINGULAIR) tablet 10 mg, 10 mg, Oral, Daily, Cyril Nick MD, 10 mg at 09/10/24 0849    nadolol (CORGARD) tablet 80 mg, 80 mg, Oral, Early Morning, Titus Portillo DO, 80 mg at 09/10/24 0520    pantoprazole (PROTONIX) EC tablet 40 mg, 40 mg, Oral, Early Morning, Cyril Nick MD, 40 mg at 09/10/24 0520    polyethylene glycol (MIRALAX) packet 17 g, 17 g, Oral, Daily PRN, Missy Guevara PA-C, 17 g at 09/07/24 2117    polyethylene glycol (MIRALAX) packet 17 g, 17 g, Oral, Daily, Titus Portillo DO, 17 g at 09/10/24 0850    predniSONE tablet 60 mg, 60 mg, Oral, Daily, 60 mg at 09/10/24 0849 **FOLLOWED BY** [START ON 9/14/2024] predniSONE tablet 40 mg, 40 mg, Oral, Daily **FOLLOWED BY** [START ON 9/21/2024] predniSONE tablet 20 mg, 20 mg, Oral, Daily **FOLLOWED BY** [START ON 9/28/2024] predniSONE tablet 10 mg, 10 mg, Oral, Daily, Imelda Reno PA-C    saccharomyces boulardii (FLORASTOR) capsule 250 mg, 250 mg, Oral, BID, Imelda Reno PA-C, 250 mg at 09/10/24 0849    senna (SENOKOT) tablet 8.6 mg, 1 tablet, Oral, HS, Titus Portillo DO, 8.6 mg at 09/09/24 2130    Sodium Zirconium Cyclosilicate (Lokelma) 10 g, 10 g, Oral, Every Other Day, Hernando Blancas, DO, 10 g at 09/09/24 1046    trimethobenzamide (TIGAN) IM injection 200 mg, 200 mg, Intramuscular, Q6H  PRN, Cyril Nick MD, 200 mg at 09/02/24 1806    zolpidem (AMBIEN) tablet 5 mg, 5 mg, Oral, HS, Cyril Nick MD, 5 mg at 09/09/24 2130    Laboratory Results:  Results from last 7 days   Lab Units 09/10/24  0447 09/09/24  0421 09/08/24  0851 09/08/24  0455 09/07/24  0920 09/07/24  0539 09/06/24  1218 09/06/24  0617 09/06/24  0455 09/05/24  0400 09/04/24  0438   WBC Thousand/uL  --  132.10* 240.15* 232.30* 171.50*  --   --   --  138.64* 125.54* 128.05*   HEMOGLOBIN g/dL  --  7.6* 6.5* 11.0* 6.5*  --   --   --  7.3* 6.3* 6.4*   HEMATOCRIT %  --  22.5* 22.0* 30.0* 22.0*  --   --   --  22.6* 19.1* 18.9*   PLATELETS Thousands/uL  --  176 265 340 205  --   --   --  255 265 312   SODIUM mmol/L 132* 133*  --  133*  --  130* 128*  --  127* 128* 132*   POTASSIUM mmol/L 4.1 4.7  --  4.6  --  5.2 5.6* 6.3* 6.6* 5.2 4.9   CHLORIDE mmol/L 98 97  --  99  --  98 98  --  99 98 100   CO2 mmol/L 29 31  --  29  --  28 26  --  26 23 24   BUN mg/dL 21 20  --  22  --  24 24  --  21 17 20   CREATININE mg/dL 0.63 0.72  --  0.77  --  0.86 0.78  --  0.75 0.77 0.87   CALCIUM mg/dL 7.8* 8.1*  --  7.9*  --  8.7 8.8  --  8.4 8.4 8.5       IR biopsy bone marrow   Final Result by Raul Bueno MD (09/09 1435)   Impression:      Technically successful image guided bone marrow aspiration and core biopsy         Workstation performed: [id number]         CT cardiac w pulmonary vein mapping   Final Result by Walker Hensley MD (09/04 5728)      No thrombus in the left atrial appendage.            Workstation performed: [id number]         US abdomen complete   Final Result by Emir Krueger MD (09/04 1122)      Mild hepatomegaly.. Possible 2 mm hemangioma.   Moderate splenomegaly.   Gallstone in the gallbladder.               Workstation performed: [id number]         CTA ED chest PE study   Final Result by Katia Patel MD (09/02 0841)      No pulmonary embolus.      Right middle lobe pneumonia.      Lack of opacification of  "the left atrial appendage. It cannot be determined if this is due to nonopacified blood in the nondependent portion of the left atrium or left atrial appendage thrombus.      Mild splenomegaly and minimally enlarged right axillary node, likely due to the patient's history of CLL.      The study was marked in EPIC for immediate notification.            Workstation performed: [id number]             Portions of the record may have been created with voice recognition software. Occasional wrong word or \"sound a like\" substitutions may have occurred due to the inherent limitations of voice recognition software. Read the chart carefully and recognize, using context, where substitutions have occurred.    "

## 2024-09-10 NOTE — ASSESSMENT & PLAN NOTE
Severe anemia, initially suspected hemolytic anemia, currently on steroids as per hematology.  -Transfuse as needed for hemoglobin less than 7.  -Hemoglobin improving 7.6 yesterday.  Continue to monitor

## 2024-09-10 NOTE — ASSESSMENT & PLAN NOTE
Volume status, echo shows EF 55%, severe pulmonary hypertension, severe TR, IVC not well-visualized.  -Currently remains on room air.  -Continue Lasix 20 mg daily.

## 2024-09-10 NOTE — ASSESSMENT & PLAN NOTE
Currently follow with Dr. Hanley, not on any current medications  WBC has been significantly elevated   Could be reactive in the setting of recent pneumonia, however cannot rule out progression of CLL  Follow-up flow cytometry  Bone marrow biopsy pending

## 2024-09-10 NOTE — PROGRESS NOTES
Progress Note - Hospitalist   Name: Murphy Willams 73 y.o. male I MRN: [id number]  Unit/Bed#: [place] [address] -01 I Date of Admission: 9/2/2024   Date of Service: 9/10/2024 I Hospital Day: 8    Assessment & Plan  Anemia  Patient presented to the hospital with worsening cough and shortness of breath.  He is recently treated for pneumonia.  In addition was found to have acute anemia with at 8.7 on admission, and 5.7 the following day.  Has a distant history of autoimmune hemolytic anemia    Appreciate hematology/oncology recommendations  hemolysis workup generally unremarkable despite positive Narciso test  Given prior history of hemolytic anemia, hematology has recommended treating with steroids.  He is currently on prednisone taper.  Transfused total of 3 units PRBCs this hospitalization  The patient's hemoglobin is improving.  He has no signs or symptoms of significant bleeding and has been restarted on his home Pradaxa  Unclear on there including etiology though with concomitant rise in WBC concerns for active leukemia.  Flow cytometry pending  Bone marrow biopsy pending  Essential hypertension  BP controlled  Home meds nadolol, losartan and aldactone  Aldactone and losartan on hold given electrolyte issues  CLL (chronic lymphocytic leukemia) (HCC)  Currently follow with Dr. Hanley, not on any current medications  WBC has been significantly elevated   Could be reactive in the setting of recent pneumonia, however cannot rule out progression of CLL  Follow-up flow cytometry  Bone marrow biopsy pending  Pulmonary hypertension (HCC)  Severe pulmonary hypertension and tricuspid regurgitation noted on echocardiogram  Cardiology following  Hyponatremia  Acute on chronic  Improving with fluid restriction and p.o. Lasix  Continue to monitor daily BMP  Paroxysmal A-fib (HCC)  Continue nadolol   CTA showing lack of opacification of the left atrial appendage, cannot r/o thrombus  Repeat echocardiogram again atrial scanning  performed: No evidence of thrombus  On Pradaxa.  Long QT syndrome type 2  Avoid QT prolonging agents  Continue home nadolol  Vitamin B 12 deficiency  Restart B12 supplement  Pneumonia  This has been treated, apparently successfully  Rash  Lacy rash over bilateral lower extremities from the thighs to the ankle.  Mildly improved since admission though still prominent.  Developed a rash shortly after completing Augmentin therapy  Question autoimmune component given history of CLL with autoimmune hemolytic anemia  Continue to monitor with supportive care time  Monitor with improvement in hemoglobin and steroids  Hyperkalemia  Acute development of hyperkalemia on morning of 9/6 at 6.6  Has since been treated with insulin/dextrose/calcium/bicarb x 2    Continue on Lokelma 10 mg daily  Continue on p.o. Lasix 20 mg daily  Holding home losartan and spironolactone  Low potassium diet  Appreciate nephrology recommendations  Monitor daily BMP      Subjective:  The patient feels generally better.  He has less cough today.  He denies chest pain or shortness of breath.  He is eating very well.  He has no abdominal pain, nausea, or vomiting.  He says that Dr. Garcia plans to give him chemotherapy tomorrow.    Physical Exam:   Temp:  [97.7 °F (36.5 °C)-98 °F (36.7 °C)] 97.7 °F (36.5 °C)  HR:  [72-74] 72  Resp:  [18-20] 18  BP: (129-134)/(69-70) 134/70    Gen: Well-developed, well-nourished, in no distress.  Neck: Supple.  No lymphadenopathy or goiter.  Heart: Regular rhythm.  I heard no murmur, gallop, or rub.  Lungs: Clear to auscultation and percussion.  I heard no wheezing, rales, or rhonchi.  Abd: Soft with active bowel sounds.  No mass or tenderness.  Extremities: No clubbing, cyanosis, or edema.  No calf tenderness.  Neuro: Alert and oriented.  No focal sign.  Skin: Warm and dry.  Lower extremity rash is unchanged.      LABS:   CBC:   Lab Results   Component Value Date    .36 (H) 09/10/2024    HGB 8.7 (L) 09/10/2024     HCT 26.9 (L) 09/10/2024     (H) 09/10/2024     09/10/2024    RBC 2.65 (L) 09/10/2024    MCH 32.8 09/10/2024    MCHC 32.3 09/10/2024    RDW 23.2 (H) 09/10/2024    MPV 10.0 09/10/2024   , CMP:   Lab Results   Component Value Date    SODIUM 132 (L) 09/10/2024    K 4.1 09/10/2024    CL 98 09/10/2024    CO2 29 09/10/2024    BUN 21 09/10/2024    CREATININE 0.63 09/10/2024    CALCIUM 7.8 (L) 09/10/2024    EGFR 99 09/10/2024             VTE Pharmacologic Prophylaxis: Pradaxa  VTE Mechanical Prophylaxis: reason for no mechanical VTE prophylaxis therapeutically anticoagulated

## 2024-09-10 NOTE — ASSESSMENT & PLAN NOTE
CLL with severe anemia, mild leukocytosis, hematology following.  Status post bone marrow biopsy on 9/9/2024, results to follow.

## 2024-09-10 NOTE — CASE MANAGEMENT
Case Management Discharge Planning Note    Patient name Murphy Willams  [facility] M* MRN [id number]  : 1950 Date 9/10/2024       Current Admission Date: 2024  Current Admission Diagnosis:Anemia   Patient Active Problem List    Diagnosis Date Noted Date Diagnosed    Hyperkalemia 2024     Anemia 2024     Rash 2024     Livedo reticularis 2024     Pneumonia 2024     Arthritis 10/25/2023     Vitamin B 12 deficiency 10/25/2023     Squamous cell carcinoma of trunk 10/23/2023     Polyp of colon 2023     Chronic combined systolic and diastolic congestive heart failure (HCC) 2023     Long QT syndrome type 2 03/10/2023     Narrow complex tachycardia 2023     Paroxysmal A-fib (HCC) 2023     Hyponatremia 2023     Hyperglycemia 10/17/2022     Alcohol use 2022     Diverticulum of bladder 2022     Encysted hydrocele 2021     Autoimmune hemolytic anemia (HCC) 2021     GAVE (gastric antral vascular ectasia) 2021     BPH with obstruction/lower urinary tract symptoms 2020     Hypercholesterolemia 2019     Depression, recurrent (HCC) 2019     DIXIE not currently treated 10/17/2018     DDD (degenerative disc disease), cervical 08/15/2018     Essential hypertension 2017     CLL (chronic lymphocytic leukemia) ([facility]) 2017     Pulmonary hypertension ([facility]) 05/15/2017     Spinal stenosis of lumbar region 05/15/2017     Erectile dysfunction of non-organic origin 2013     Gastroesophageal reflux disease without esophagitis 2012     Allergic rhinitis due to pollen 2012       LOS (days): 8  Geometric Mean LOS (GMLOS) (days): 4.1  Days to GMLOS:-3.9     OBJECTIVE:  Risk of Unplanned Readmission Score: 25.14         Current admission status: Inpatient   Preferred Pharmacy:   [email or web address] [place] - [address]  [address]  [facility]  [address]  Phone: [phone] Fax: [phone]    [id number] - [place] - [address]  [address] [facility]  [place] [address]  Phone: [phone] Fax: [phone]    [facility] [place] [place] [address]  [facility],  [address]  [facility],  First [address] [facility] [place] [address]  Phone: [phone] Fax: [phone]    [id number] [place] [facility] [address] [facility]  [address] [facility] [place] [address]  Phone: [phone] Fax: [phone]    Primary Care Provider: Pravin Ortega Jr, MD    Primary Insurance: MEDICARE  Secondary Insurance: BLUE CROSS    DISCHARGE DETAILS:                                                                                        IMM Given (Date):: 09/10/24  IMM Given to:: Patient (Original placed in scan bin. Copy handed to pt's wife, as pt was on the phone.)

## 2024-09-10 NOTE — TELEPHONE ENCOUNTER
Pt called in expressing how frustrated and upset he is about what occurred with Dr. Hanley office. Pt stated that his wife who is disabled was taken to Dr. Hanley office in [place] to  a special cancer pill for him. When she got there they decide that he can't take the pill because of the medication he is currently on in the hospital because it can cause severe bleeding.     He is very upset and questioned why didn't Dr. Hanley read his chart prior to making his wife go to the office for a pill he can't take. He stated he saw Dr. Aspen Garcia this morning who told him that he can call in to speak with her. Pt stated he wants to speak immediately with her and that it urgent. Pt can be reached at [phone]. Thank you.

## 2024-09-10 NOTE — ASSESSMENT & PLAN NOTE
Patient presented to the hospital with worsening cough and shortness of breath.  He is recently treated for pneumonia.  In addition was found to have acute anemia with at 8.7 on admission, and 5.7 the following day.  Has a distant history of autoimmune hemolytic anemia    Appreciate hematology/oncology recommendations  hemolysis workup generally unremarkable despite positive Narciso test  Given prior history of hemolytic anemia, hematology has recommended treating with steroids.  He is currently on prednisone taper.  Transfused total of 3 units PRBCs this hospitalization  The patient's hemoglobin is improving.  He has no signs or symptoms of significant bleeding and has been restarted on his home Pradaxa  Unclear on there including etiology though with concomitant rise in WBC concerns for active leukemia.  Flow cytometry pending  Bone marrow biopsy pending

## 2024-09-10 NOTE — PLAN OF CARE
Problem: PAIN - ADULT  Goal: Verbalizes/displays adequate comfort level or baseline comfort level  Description: Interventions:  - Encourage patient to monitor pain and request assistance  - Assess pain using appropriate pain scale  - Administer analgesics based on type and severity of pain and evaluate response  - Implement non-pharmacological measures as appropriate and evaluate response  - Consider cultural and social influences on pain and pain management  - Notify physician/advanced practitioner if interventions unsuccessful or patient reports new pain  Outcome: Progressing     Problem: INFECTION - ADULT  Goal: Absence or prevention of progression during hospitalization  Description: INTERVENTIONS:  - Assess and monitor for signs and symptoms of infection  - Monitor lab/diagnostic results  - Monitor all insertion sites, i.e. indwelling lines, tubes, and drains  - Monitor endotracheal if appropriate and nasal secretions for changes in amount and color  - [place] appropriate cooling/warming therapies per order  - Administer medications as ordered  - Instruct and encourage patient and family to use good hand hygiene technique  - Identify and instruct in appropriate isolation precautions for identified infection/condition  Outcome: Progressing

## 2024-09-10 NOTE — PROGRESS NOTES
Progress Note - Oncology-Medical   Name: Murphy Willams 73 y.o. male I MRN: [id number]  Unit/Bed#: [place] [address] [place] [id number] I Date of Admission: 9/2/2024   Date of Service: 9/10/2024 I Hospital Day: 8     Assessment and Plan:    Mr. Willams is a 73-year-old gentleman with underlying CLL and initially concerns for hemolytic anemia although now more concerning for progressive CLL resulting in anemia.        1.  CLL  Progressive resulting in anemia.  He is status post bone marrow biopsy yesterday for close evaluation.  Given that is causing symptoms, we will initiate treatment.  We will plan on beginning treatment for CLL.  Current regimen includes obinutuzumab plus or minus venetoclax.  We will start treatment with obinutuzumab today in the hospital.  Labs reviewed and ok to treat.    Consent obtained.  Orders put into [facility].  Plan converted.  All required labs already drawn and ordered hepatitis B screening as well.        History of Present Illness     Subjective : He was initially happy to hear that we were going to be able to give him oral medication to treat his CLL which we think is resulting in his anemia.  However it needed to be changed as there were some contraindications to current medications with the planned medication.  Ultimately selling on obinutuzumab plus or minus venetoclax.  He is feeling otherwise okay.  Emotional about all of this.  Hoping to feel better and get home soon.    Objective      Temp:  [97.7 °F (36.5 °C)-98 °F (36.7 °C)] 97.7 °F (36.5 °C)  HR:  [72-74] 72  Resp:  [18-20] 18  BP: (129-134)/(69-70) 134/70  O2 Device: None (Room air)          I/O last 24 hours:  In: 1560 [P.O.:1560]  Out: 225 [Urine:225]  Lines/Drains/Airways       Active Status       None                  Physical Exam  Constitutional:       General: He is in acute distress (upset about treatment).      Appearance: Normal appearance. He is not ill-appearing.   HENT:      Head: Normocephalic and atraumatic.      Right Ear:  External ear normal.      Left Ear: External ear normal.      Nose: Nose normal. No congestion.      Mouth/Throat:      Mouth: Mucous membranes are moist.      Pharynx: Oropharynx is clear. No oropharyngeal exudate.   Eyes:      General: No scleral icterus.        Right eye: No discharge.         Left eye: No discharge.      Conjunctiva/sclera: Conjunctivae normal.   Pulmonary:      Effort: Pulmonary effort is normal. No respiratory distress.      Breath sounds: No wheezing.   Abdominal:      General: There is no distension.   Musculoskeletal:         General: Normal range of motion.      Cervical back: Normal range of motion. No rigidity.      Right lower leg: No edema.      Left lower leg: No edema.   Skin:     General: Skin is warm.      Coloration: Skin is not jaundiced.      Findings: No lesion or rash.   Neurological:      General: No focal deficit present.      Mental Status: He is alert and oriented to person, place, and time.      Cranial Nerves: No cranial nerve deficit.   Psychiatric:         Thought Content: Thought content normal.                Lab Results: I have reviewed the following results: CBC/BMP:   .     09/10/24  0447 09/10/24  0814   WBC  --  165.36*   HGB  --  8.7*   HCT  --  26.9*   PLT  --  239   SODIUM 132*  --    K 4.1  --    CL 98  --    CO2 29  --    BUN 21  --    CREATININE 0.63  --    GLUC 130  --       Imaging Review: No pertinent imaging studies reviewed.  Other Studies: No additional pertinent studies reviewed.    VTE Pharmacologic Prophylaxis: pradashailesh Garcia MD, PhD

## 2024-09-10 NOTE — ASSESSMENT & PLAN NOTE
-Suspected in the setting of taking ARB, spironolactone, also concern for hemolytic anemia, CLL  -Continue to remain off offending medications including ARB, spironolactone.  -Potassium level has much improved and now remains stable 4.1 today.  -Currently on Lokelma 10 g every other day, Lasix 20 mg daily.  -Discussed potassium restricted diet although he does not want to follow restricted diet.  -Repeat BMP in AM.

## 2024-09-10 NOTE — ASSESSMENT & PLAN NOTE
-Suspected some element of hypervolemia given cardiomyopathy, severe TR  -Serum sodium relatively stable 132 today.  -Patient refuses to follow fluid restriction and wants to only continue regular diet.  Again recommended to try to follow fluid restriction which he says he will try without having order for fluid restriction.  -Monitor with current diuretics.

## 2024-09-11 LAB
ANION GAP SERPL CALCULATED.3IONS-SCNC: 6 MMOL/L (ref 4–13)
ANISOCYTOSIS BLD QL SMEAR: PRESENT
BASOPHILS # BLD MANUAL: 0 THOUSAND/UL (ref 0–0.1)
BASOPHILS NFR MAR MANUAL: 0 % (ref 0–1)
BUN SERPL-MCNC: 20 MG/DL (ref 5–25)
CALCIUM SERPL-MCNC: 7.9 MG/DL (ref 8.4–10.2)
CHLORIDE SERPL-SCNC: 99 MMOL/L (ref 96–108)
CO2 SERPL-SCNC: 27 MMOL/L (ref 21–32)
CREAT SERPL-MCNC: 0.64 MG/DL (ref 0.6–1.3)
EOSINOPHIL # BLD MANUAL: 0 THOUSAND/UL (ref 0–0.4)
EOSINOPHIL NFR BLD MANUAL: 0 % (ref 0–6)
ERYTHROCYTE [DISTWIDTH] IN BLOOD BY AUTOMATED COUNT: 20.9 % (ref 11.6–15.1)
GFR SERPL CREATININE-BSD FRML MDRD: 98 ML/MIN/1.73SQ M
GLUCOSE SERPL-MCNC: 157 MG/DL (ref 65–140)
HCT VFR BLD AUTO: 26.5 % (ref 36.5–49.3)
HGB BLD-MCNC: 8.6 G/DL (ref 12–17)
LDH SERPL-CCNC: 280 U/L (ref 140–271)
LYMPHOCYTES # BLD AUTO: 63.14 THOUSAND/UL (ref 0.6–4.47)
LYMPHOCYTES # BLD AUTO: 89 % (ref 14–44)
MACROCYTES BLD QL AUTO: PRESENT
MCH RBC QN AUTO: 33.1 PG (ref 26.8–34.3)
MCHC RBC AUTO-ENTMCNC: 32.5 G/DL (ref 31.4–37.4)
MCV RBC AUTO: 102 FL (ref 82–98)
MONOCYTES # BLD AUTO: 0 THOUSAND/UL (ref 0–1.22)
MONOCYTES NFR BLD: 0 % (ref 4–12)
NEUTROPHILS # BLD MANUAL: 7.8 THOUSAND/UL (ref 1.85–7.62)
NEUTS BAND NFR BLD MANUAL: 1 % (ref 0–8)
NEUTS SEG NFR BLD AUTO: 10 % (ref 43–75)
PLATELET # BLD AUTO: 183 THOUSANDS/UL (ref 149–390)
PLATELET BLD QL SMEAR: ADEQUATE
PMV BLD AUTO: 9.9 FL (ref 8.9–12.7)
POTASSIUM SERPL-SCNC: 4.2 MMOL/L (ref 3.5–5.3)
RBC # BLD AUTO: 2.6 MILLION/UL (ref 3.88–5.62)
RBC MORPH BLD: PRESENT
SODIUM SERPL-SCNC: 132 MMOL/L (ref 135–147)
URATE SERPL-MCNC: 7.4 MG/DL (ref 3.5–8.5)
WBC # BLD AUTO: 70.94 THOUSAND/UL (ref 4.31–10.16)

## 2024-09-11 PROCEDURE — 85027 COMPLETE CBC AUTOMATED: CPT | Performed by: INTERNAL MEDICINE

## 2024-09-11 PROCEDURE — 84550 ASSAY OF BLOOD/URIC ACID: CPT | Performed by: INTERNAL MEDICINE

## 2024-09-11 PROCEDURE — 99232 SBSQ HOSP IP/OBS MODERATE 35: CPT | Performed by: INTERNAL MEDICINE

## 2024-09-11 PROCEDURE — 83615 LACTATE (LD) (LDH) ENZYME: CPT | Performed by: INTERNAL MEDICINE

## 2024-09-11 PROCEDURE — 93005 ELECTROCARDIOGRAM TRACING: CPT

## 2024-09-11 PROCEDURE — 80048 BASIC METABOLIC PNL TOTAL CA: CPT | Performed by: INTERNAL MEDICINE

## 2024-09-11 PROCEDURE — 85007 BL SMEAR W/DIFF WBC COUNT: CPT | Performed by: INTERNAL MEDICINE

## 2024-09-11 RX ORDER — PREDNISONE 20 MG/1
40 TABLET ORAL DAILY
Status: DISCONTINUED | OUTPATIENT
Start: 2024-09-11 | End: 2024-09-12 | Stop reason: HOSPADM

## 2024-09-11 RX ORDER — LORATADINE 10 MG/1
10 TABLET ORAL DAILY PRN
Status: DISCONTINUED | OUTPATIENT
Start: 2024-09-11 | End: 2024-09-12 | Stop reason: HOSPADM

## 2024-09-11 RX ORDER — ALBUTEROL SULFATE 0.83 MG/ML
2.5 SOLUTION RESPIRATORY (INHALATION) ONCE
Status: COMPLETED | OUTPATIENT
Start: 2024-09-11 | End: 2024-09-11

## 2024-09-11 RX ORDER — DIPHENHYDRAMINE HYDROCHLORIDE 50 MG/ML
50 INJECTION INTRAMUSCULAR; INTRAVENOUS ONCE
Status: COMPLETED | OUTPATIENT
Start: 2024-09-11 | End: 2024-09-11

## 2024-09-11 RX ORDER — ALLOPURINOL 300 MG/1
300 TABLET ORAL DAILY
Status: DISCONTINUED | OUTPATIENT
Start: 2024-09-11 | End: 2024-09-12 | Stop reason: HOSPADM

## 2024-09-11 RX ORDER — PREDNISONE 20 MG/1
20 TABLET ORAL DAILY
Status: DISCONTINUED | OUTPATIENT
Start: 2024-09-15 | End: 2024-09-12 | Stop reason: HOSPADM

## 2024-09-11 RX ORDER — FAMOTIDINE 10 MG/ML
20 INJECTION, SOLUTION INTRAVENOUS ONCE
Status: COMPLETED | OUTPATIENT
Start: 2024-09-11 | End: 2024-09-11

## 2024-09-11 RX ORDER — PREDNISONE 10 MG/1
10 TABLET ORAL DAILY
Status: DISCONTINUED | OUTPATIENT
Start: 2024-09-19 | End: 2024-09-12 | Stop reason: HOSPADM

## 2024-09-11 RX ORDER — SODIUM CHLORIDE 9 MG/ML
20 INJECTION, SOLUTION INTRAVENOUS ONCE
Status: COMPLETED | OUTPATIENT
Start: 2024-09-11 | End: 2024-09-12

## 2024-09-11 RX ORDER — FAMOTIDINE 10 MG/ML
INJECTION, SOLUTION INTRAVENOUS
Status: COMPLETED
Start: 2024-09-11 | End: 2024-09-11

## 2024-09-11 RX ORDER — ACETAMINOPHEN 325 MG/1
650 TABLET ORAL ONCE
Status: COMPLETED | OUTPATIENT
Start: 2024-09-11 | End: 2024-09-11

## 2024-09-11 RX ORDER — SODIUM CHLORIDE 9 MG/ML
50 INJECTION, SOLUTION INTRAVENOUS CONTINUOUS
Status: DISCONTINUED | OUTPATIENT
Start: 2024-09-11 | End: 2024-09-12

## 2024-09-11 RX ADMIN — DIPHENHYDRAMINE HYDROCHLORIDE 50 MG: 50 INJECTION, SOLUTION INTRAMUSCULAR; INTRAVENOUS at 03:52

## 2024-09-11 RX ADMIN — OBINUTUZUMAB 900 MG: 1000 INJECTION, SOLUTION, CONCENTRATE INTRAVENOUS at 21:46

## 2024-09-11 RX ADMIN — NADOLOL 80 MG: 20 TABLET ORAL at 05:34

## 2024-09-11 RX ADMIN — FUROSEMIDE 20 MG: 20 TABLET ORAL at 09:46

## 2024-09-11 RX ADMIN — SODIUM ZIRCONIUM CYCLOSILICATE 10 G: 10 POWDER, FOR SUSPENSION ORAL at 09:42

## 2024-09-11 RX ADMIN — ALBUTEROL SULFATE 2.5 MG: 2.5 SOLUTION RESPIRATORY (INHALATION) at 04:05

## 2024-09-11 RX ADMIN — DEXAMETHASONE SODIUM PHOSPHATE 20 MG: 10 INJECTION, SOLUTION INTRAMUSCULAR; INTRAVENOUS at 20:35

## 2024-09-11 RX ADMIN — HYDROCORTISONE SODIUM SUCCINATE 100 MG: 100 INJECTION, POWDER, FOR SOLUTION INTRAMUSCULAR; INTRAVENOUS at 03:52

## 2024-09-11 RX ADMIN — OMEGA-3 FATTY ACIDS CAP 1000 MG 1000 MG: 1000 CAP at 09:46

## 2024-09-11 RX ADMIN — DABIGATRAN ETEXILATE MESYLATE 150 MG: 150 CAPSULE ORAL at 09:46

## 2024-09-11 RX ADMIN — GUAIFENESIN 600 MG: 600 TABLET, EXTENDED RELEASE ORAL at 22:12

## 2024-09-11 RX ADMIN — Medication 250 MG: at 17:00

## 2024-09-11 RX ADMIN — LIDOCAINE 1 PATCH: 700 PATCH TOPICAL at 10:36

## 2024-09-11 RX ADMIN — ALLOPURINOL 300 MG: 300 TABLET ORAL at 16:59

## 2024-09-11 RX ADMIN — PANTOPRAZOLE SODIUM 40 MG: 40 TABLET, DELAYED RELEASE ORAL at 05:34

## 2024-09-11 RX ADMIN — PREDNISONE 60 MG: 20 TABLET ORAL at 09:45

## 2024-09-11 RX ADMIN — Medication 250 MG: at 09:45

## 2024-09-11 RX ADMIN — DEXAMETHASONE SODIUM PHOSPHATE 20 MG: 10 INJECTION, SOLUTION INTRAMUSCULAR; INTRAVENOUS at 01:00

## 2024-09-11 RX ADMIN — SODIUM CHLORIDE 50 ML/HR: 0.9 INJECTION, SOLUTION INTRAVENOUS at 18:41

## 2024-09-11 RX ADMIN — GUAIFENESIN 600 MG: 600 TABLET, EXTENDED RELEASE ORAL at 09:46

## 2024-09-11 RX ADMIN — ATORVASTATIN CALCIUM 20 MG: 20 TABLET, FILM COATED ORAL at 16:56

## 2024-09-11 RX ADMIN — DIPHENHYDRAMINE HYDROCHLORIDE 25 MG: 50 INJECTION, SOLUTION INTRAMUSCULAR; INTRAVENOUS at 21:21

## 2024-09-11 RX ADMIN — DABIGATRAN ETEXILATE MESYLATE 150 MG: 150 CAPSULE ORAL at 22:12

## 2024-09-11 RX ADMIN — ACETAMINOPHEN 650 MG: 325 TABLET ORAL at 20:41

## 2024-09-11 RX ADMIN — SODIUM CHLORIDE 20 ML/HR: 0.9 INJECTION, SOLUTION INTRAVENOUS at 20:30

## 2024-09-11 RX ADMIN — SENNOSIDES 8.6 MG: 8.6 TABLET, FILM COATED ORAL at 22:12

## 2024-09-11 RX ADMIN — OBINUTUZUMAB 100 MG: 1000 INJECTION, SOLUTION, CONCENTRATE INTRAVENOUS at 02:30

## 2024-09-11 RX ADMIN — LORAZEPAM 0.5 MG: 0.5 TABLET ORAL at 20:08

## 2024-09-11 RX ADMIN — FAMOTIDINE 20 MG: 10 INJECTION, SOLUTION INTRAVENOUS at 03:56

## 2024-09-11 RX ADMIN — MONTELUKAST 10 MG: 10 TABLET, FILM COATED ORAL at 09:45

## 2024-09-11 RX ADMIN — ZOLPIDEM TARTRATE 5 MG: 5 TABLET, COATED ORAL at 00:48

## 2024-09-11 RX ADMIN — CYANOCOBALAMIN TAB 500 MCG 500 MCG: 500 TAB at 09:45

## 2024-09-11 NOTE — ASSESSMENT & PLAN NOTE
Currently follow with Dr. Hanley  Immunotherapy has been started.  Follow-up flow cytometry  Bone marrow biopsy pending

## 2024-09-11 NOTE — PROGRESS NOTES
Progress Note - Nephrology   Name: Murphy Willams 73 y.o. male I MRN: [id number]  Unit/Bed#: E2 -01 I Date of Admission: 9/2/2024   Date of Service: 9/11/2024 I Hospital Day: 9     Assessment & Plan  Hyperkalemia  -Suspected in the setting of taking ARB, spironolactone, also concern for hemolytic anemia, CLL  -Continue to remain off offending medications including ARB, spironolactone.  -Potassium level has much improved and remained stable 4.2 today.  -Currently on Lokelma 10 g every other day, Lasix 20 mg daily.  -Again discussed potassium restricted diet although he does not want to follow restricted diet.  -Repeat BMP in AM.  Hyponatremia  -Suspected some element of hypervolemia given cardiomyopathy, severe TR  -Serum sodium relatively stable 132 today.  -Patient refuses to follow fluid restriction and wants to only continue regular diet.  Again recommended to try to follow fluid restriction but he is not interested as he has chronic hyponatremia for many years and this seems to be his baseline..  -Monitor with current diuretics.  CLL (chronic lymphocytic leukemia) (HCC)  CLL with severe anemia, mild leukocytosis, hematology following.  Status post bone marrow biopsy on 9/9/2024, results to follow.  -Patient is started on chemo as per hematology.  Pulmonary hypertension (HCC)  Volume status, echo shows EF 55%, severe pulmonary hypertension, severe TR, IVC not well-visualized.  -Currently remains on room air.  -Continue Lasix 20 mg daily.  Anemia  Severe anemia, initially suspected hemolytic anemia, currently on steroids as per hematology.  -Transfuse as needed for hemoglobin less than 7.  -Hemoglobin improving 8.7 yesterday.  Continue to monitor    Blood pressure somewhat fluctuating with occasional high readings noted.  Continue to monitor for now.  Steroid contributing as well.  Currently remains on nadolol, diuretics.    Discussed above plan in detail with primary team regarding monitoring BMP and they  agree with above recommendations.      SUBJECTIVE:  Patient seen and examined at bedside.  Denies any chest pain or worsening shortness of breath.    OBJECTIVE:  Current Weight: Weight - Scale: 86.6 kg (190 lb 14.7 oz)  Vitals:    09/11/24 0534   BP: 142/72   Pulse: 69   Resp:    Temp:    SpO2: 96%       Intake/Output Summary (Last 24 hours) at 9/11/2024 0743  Last data filed at 9/11/2024 0003  Gross per 24 hour   Intake --   Output 400 ml   Net -400 ml     Wt Readings from Last 3 Encounters:   09/03/24 86.6 kg (190 lb 14.7 oz)   08/28/24 86.6 kg (191 lb)   08/20/24 87 kg (191 lb 12.8 oz)     Temp Readings from Last 3 Encounters:   09/11/24 (!) 97.3 °F (36.3 °C)   08/28/24 98.4 °F (36.9 °C) (Tympanic)   08/20/24 98.6 °F (37 °C) (Tympanic)     BP Readings from Last 3 Encounters:   09/11/24 142/72   08/28/24 102/62   08/20/24 136/72     Pulse Readings from Last 3 Encounters:   09/11/24 69   08/28/24 93   08/20/24 87        Physical Examination:  Eyes: Mild conjunctival pallor present  Neck: No obvious lymphadenopathy appreciated  Respiratory: Bilateral air entry present  CVS: No significant edema  GI: Soft, nondistended  CNS: Active, alert, follows commands  Skin: No new rash  Musculoskeletal: No obvious new gross deformity noted    Medications:    Current Facility-Administered Medications:     acetaminophen (TYLENOL) tablet 650 mg, 650 mg, Oral, Q6H PRN, Cyril Nick MD, 650 mg at 09/09/24 0452    albuterol (PROVENTIL HFA,VENTOLIN HFA) inhaler 2 puff, 2 puff, Inhalation, Q4H PRN, Cyril Nick MD    alteplase (CATHFLO) injection 2 mg, 2 mg, Intracatheter, Q1MIN PRN, Aspen Garcia MD    aluminum-magnesium hydroxide-simethicone (MAALOX) oral suspension 30 mL, 30 mL, Oral, Q6H PRN, Cyril Nick MD, 30 mL at 09/03/24 1924    ascorbic acid (VITAMIN C) tablet 250 mg, 250 mg, Oral, Daily, Imelda Reno PA-C, 250 mg at 09/10/24 0849    atorvastatin (LIPITOR) tablet 20 mg, 20 mg, Oral, Daily With Dinner,  Cyril Nick MD, 20 mg at 09/10/24 1628    benzonatate (TESSALON PERLES) capsule 100 mg, 100 mg, Oral, TID PRN, Cyril Nick MD, 100 mg at 09/09/24 2135    cyanocobalamin (VITAMIN B-12) tablet 500 mcg, 500 mcg, Oral, Daily, Imelda Reno PA-C, 500 mcg at 09/10/24 0849    dabigatran etexilate (PRADAXA) capsule 150 mg, 150 mg, Oral, Q12H CHET, Bhavesh Jarvis MD, 150 mg at 09/10/24 2228    diphenhydrAMINE (BENADRYL) 25 mg in sodium chloride 0.9 % 50 mL IVPB, 25 mg, Intravenous, Once, Aspen Garcia MD    fish oil capsule 1,000 mg, 1,000 mg, Oral, Daily, Imelda Reno PA-C, 1,000 mg at 09/10/24 0849    furosemide (LASIX) tablet 20 mg, 20 mg, Oral, Daily, Titus Portillo DO, 20 mg at 09/10/24 0849    guaiFENesin (MUCINEX) 12 hr tablet 600 mg, 600 mg, Oral, BID, Cyril Nick MD, 600 mg at 09/10/24 2228    lidocaine (LIDODERM) 5 % patch 1 patch, 1 patch, Topical, Daily, Fritz Cross PA-C, 1 patch at 09/10/24 0849    loratadine (CLARITIN) tablet 10 mg, 10 mg, Oral, Daily PRN, Rustam Barlow DO, 10 mg at 09/10/24 2352    LORazepam (ATIVAN) tablet 0.5 mg, 0.5 mg, Oral, Q8H PRN, Titus Portillo DO, 0.5 mg at 09/10/24 2228    montelukast (SINGULAIR) tablet 10 mg, 10 mg, Oral, Daily, Cyril Nick MD, 10 mg at 09/10/24 0849    nadolol (CORGARD) tablet 80 mg, 80 mg, Oral, Early Morning, Titus Portillo DO, 80 mg at 09/11/24 0534    pantoprazole (PROTONIX) EC tablet 40 mg, 40 mg, Oral, Early Morning, Cyril Nick MD, 40 mg at 09/11/24 0534    polyethylene glycol (MIRALAX) packet 17 g, 17 g, Oral, Daily PRN, Missy Guevara PA-C, 17 g at 09/07/24 2117    polyethylene glycol (MIRALAX) packet 17 g, 17 g, Oral, Daily, Titus Portillo DO, 17 g at 09/10/24 0850    predniSONE tablet 60 mg, 60 mg, Oral, Daily, 60 mg at 09/10/24 0849 **FOLLOWED BY** [START ON 9/14/2024] predniSONE tablet 40 mg, 40 mg, Oral, Daily **FOLLOWED BY** [START ON 9/21/2024] predniSONE tablet 20 mg, 20  mg, Oral, Daily **FOLLOWED BY** [START ON 9/28/2024] predniSONE tablet 10 mg, 10 mg, Oral, Daily, Imelda Reno PA-C    saccharomyces boulardii (FLORASTOR) capsule 250 mg, 250 mg, Oral, BID, Imelda Reno PA-C, 250 mg at 09/10/24 1754    senna (SENOKOT) tablet 8.6 mg, 1 tablet, Oral, HS, Titus Portillo DO, 8.6 mg at 09/10/24 2227    Sodium Zirconium Cyclosilicate (Lokelma) 10 g, 10 g, Oral, Every Other Day, Hernando Blnacas DO, 10 g at 09/09/24 1046    trimethobenzamide (TIGAN) IM injection 200 mg, 200 mg, Intramuscular, Q6H PRN, Cyril Nick MD, 200 mg at 09/02/24 1806    zolpidem (AMBIEN) tablet 5 mg, 5 mg, Oral, HS, Cyril Nick MD, 5 mg at 09/11/24 0048    Laboratory Results:  Results from last 7 days   Lab Units 09/11/24  0508 09/10/24  0814 09/10/24  0447 09/09/24  0421 09/08/24  0851 09/08/24  0455 09/07/24  0920 09/07/24  0539 09/06/24  1218 09/06/24  0617 09/06/24  0455 09/05/24  0400   WBC Thousand/uL  --  165.36*  --  132.10* 240.15* 232.30* 171.50*  --   --   --  138.64* 125.54*   HEMOGLOBIN g/dL  --  8.7*  --  7.6* 6.5* 11.0* 6.5*  --   --   --  7.3* 6.3*   HEMATOCRIT %  --  26.9*  --  22.5* 22.0* 30.0* 22.0*  --   --   --  22.6* 19.1*   PLATELETS Thousands/uL  --  239  --  176 265 340 205  --   --   --  255 265   SODIUM mmol/L 132*  --  132* 133*  --  133*  --  130* 128*  --  127* 128*   POTASSIUM mmol/L 4.2  --  4.1 4.7  --  4.6  --  5.2 5.6* 6.3* 6.6* 5.2   CHLORIDE mmol/L 99  --  98 97  --  99  --  98 98  --  99 98   CO2 mmol/L 27  --  29 31  --  29  --  28 26  --  26 23   BUN mg/dL 20  --  21 20  --  22  --  24 24  --  21 17   CREATININE mg/dL 0.64  --  0.63 0.72  --  0.77  --  0.86 0.78  --  0.75 0.77   CALCIUM mg/dL 7.9*  --  7.8* 8.1*  --  7.9*  --  8.7 8.8  --  8.4 8.4       IR biopsy bone marrow   Final Result by Raul Buneo MD (09/09 5148)   Impression:      Technically successful image guided bone marrow aspiration and core biopsy         Workstation  "performed: [id number]         CT cardiac w pulmonary vein mapping   Final Result by Walker Hensley MD (09/04 1548)      No thrombus in the left atrial appendage.            Workstation performed: [id number]         US abdomen complete   Final Result by Emir Krueger MD (09/04 1122)      Mild hepatomegaly.. Possible 2 mm hemangioma.   Moderate splenomegaly.   Gallstone in the gallbladder.               Workstation performed: [id number]         CTA ED chest PE study   Final Result by Katia Patel MD (09/02 0841)      No pulmonary embolus.      Right middle lobe pneumonia.      Lack of opacification of the left atrial appendage. It cannot be determined if this is due to nonopacified blood in the nondependent portion of the left atrium or left atrial appendage thrombus.      Mild splenomegaly and minimally enlarged right axillary node, likely due to the patient's history of CLL.      The study was marked in EPIC for immediate notification.            Workstation performed: [id number]             Portions of the record may have been created with voice recognition software. Occasional wrong word or \"sound a like\" substitutions may have occurred due to the inherent limitations of voice recognition software. Read the chart carefully and recognize, using context, where substitutions have occurred.    "

## 2024-09-11 NOTE — ASSESSMENT & PLAN NOTE
-Suspected some element of hypervolemia given cardiomyopathy, severe TR  -Serum sodium relatively stable 132 today.  -Patient refuses to follow fluid restriction and wants to only continue regular diet.  Again recommended to try to follow fluid restriction but he is not interested as he has chronic hyponatremia for many years and this seems to be his baseline..  -Monitor with current diuretics.

## 2024-09-11 NOTE — ASSESSMENT & PLAN NOTE
Patient presented to the hospital with worsening cough and shortness of breath.  He is recently treated for pneumonia.  In addition was found to have acute anemia with at 8.7 on admission, and 5.7 the following day.  Has a distant history of autoimmune hemolytic anemia    Appreciate hematology/oncology recommendations  hemolysis workup generally unremarkable despite positive Narciso test  Given prior history of hemolytic anemia, hematology has recommended treating with steroids.  He is currently on prednisone taper.  Transfused total of 3 units PRBCs this hospitalization  The patient's hemoglobin is improving.  It is thought likely that the patient's progressive anemia is related to his underlying CLL.  Immunotherapy was started last evening.  Flow cytometry pending  Bone marrow biopsy pending

## 2024-09-11 NOTE — ASSESSMENT & PLAN NOTE
Severe anemia, initially suspected hemolytic anemia, currently on steroids as per hematology.  -Transfuse as needed for hemoglobin less than 7.  -Hemoglobin improving 8.7 yesterday.  Continue to monitor

## 2024-09-11 NOTE — PROGRESS NOTES
Progress Note - Oncology-Medical   Name: Murphy Willams 73 y.o. male I MRN: [id number]  Unit/Bed#: E2 -01 I Date of Admission: 9/2/2024   Date of Service: 9/11/2024 I Hospital Day: 9       Assessment and Plan:  Mr. Willams is a 73-year-old gentleman with CLL now with progressive disease resulting in anemia who began treatment with obinituzumab yesterday and will receive second dose tonight.  Labs reviewed and white blood cell count has decreased and hemoglobin 8.7.  Uric acid is increased from baseline, even though within normal limits.    Add allopurinol and gentle hydration.   Will continue to monitor closely but please reach out with immediate issues.    He originally was on prednisone for thoughts of hemolytic anemia, which we now think anemia related to progressive CLL.  Is stable at this time.  I have changed his prednisone taper for a quicker taper off.  Having him start with 40 mg at his next dose for 4 days, then 20 mg for 4 days, then 10 mg for 4 days and then stop.      Patient is stable, would recommend discharge according to primary team.  Has follow-up with Dr. Hanley in the outpatient setting next week.        Subjective :   He tolerated treatment with mild reaction stating he did not just feel right and could not get comfortable bed, and was also coughing and sounding hoarse with some shortness of breath.  Responded nicely to hypersensitivity protocol.  Following that resume treatment with no issues.    Dates he is doing fine and thinks he was just anxious about getting treated.    Objective      Temp:  [97 °F (36.1 °C)-98.2 °F (36.8 °C)] 98.2 °F (36.8 °C)  HR:  [69-81] 70  Resp:  [16-20] 20  BP: (120-165)/(70-84) 143/79  O2 Device: None (Room air)          I/O last 24 hours:  In: 634 [P.O.:480; IV Piggyback:154]  Out: 400 [Urine:400]  Lines/Drains/Airways       Active Status       None                  Physical Exam  Constitutional:       General: He is not in acute distress.     Appearance:  Normal appearance. He is not ill-appearing or toxic-appearing.   HENT:      Head: Normocephalic and atraumatic.      Right Ear: External ear normal.      Left Ear: External ear normal.      Nose: Nose normal.      Mouth/Throat:      Mouth: Mucous membranes are moist.      Pharynx: Oropharynx is clear.   Eyes:      General: No scleral icterus.        Right eye: No discharge.         Left eye: No discharge.      Conjunctiva/sclera: Conjunctivae normal.   Cardiovascular:      Rate and Rhythm: Normal rate and regular rhythm.      Pulses: Normal pulses.      Heart sounds: No murmur heard.     No friction rub. No gallop.   Pulmonary:      Effort: Pulmonary effort is normal. No respiratory distress.      Breath sounds: Normal breath sounds. No wheezing or rales.   Abdominal:      General: Bowel sounds are normal. There is no distension.      Palpations: There is no mass.      Tenderness: There is no abdominal tenderness. There is no rebound.   Musculoskeletal:         General: No swelling or tenderness.      Cervical back: Normal range of motion. No rigidity.      Right lower leg: No edema.      Left lower leg: No edema.   Skin:     General: Skin is warm.      Coloration: Skin is not jaundiced.      Findings: Rash (lacy rash on legs started after augemtin, improving) present. No lesion.      Comments: No concerning skin lesions   Neurological:      General: No focal deficit present.      Mental Status: He is alert and oriented to person, place, and time.      Cranial Nerves: No cranial nerve deficit.      Motor: No weakness.      Gait: Gait normal.   Psychiatric:         Mood and Affect: Mood normal.         Behavior: Behavior normal.         Thought Content: Thought content normal.         Judgment: Judgment normal.          Lab Results: I have reviewed the following results: CBC/BMP:   .     09/11/24  0508   WBC 70.94*   HGB 8.6*   HCT 26.5*      BANDSPCT 1   SODIUM 132*   K 4.2   CL 99   CO2 27   BUN 20    CREATININE 0.64   GLUC 157*      Imaging Review: No pertinent imaging studies reviewed.  Other Studies: No additional pertinent studies reviewed.    VTE Pharmacologic Prophylaxis: magdalene Garcia MD, PhD

## 2024-09-11 NOTE — QUICK NOTE
Received notification from the answering service that the patient's hepatitis B lab has not returned.  This lab was ordered, it has been drawn, but it has not resulted and is in transit to the specialty lab.  This lab is recommended to be verified prior to starting his planned obinutuzumab.  I advised to hold this medication until his hepatitis panel is back.    Also, patient advised that his cardiologist advised him he could not have Benadryl as a pre-medication 2/2 QT-prolongation.  While not ideal, I ordered loratadine as a replacement.    Rustam Barlow DO, PGY4  Hematology/Oncology Fellow

## 2024-09-11 NOTE — ASSESSMENT & PLAN NOTE
CLL with severe anemia, mild leukocytosis, hematology following.  Status post bone marrow biopsy on 9/9/2024, results to follow.  -Patient is started on chemo as per hematology.

## 2024-09-11 NOTE — QUICK NOTE
Hepatitis B panel was negative for active or remote infection.  Patient is okay to begin immunotherapy.    Rustam Barlow DO, PGY4  Hematology/Oncology Fellow

## 2024-09-11 NOTE — NURSING NOTE
Pharmacy contacted nursing regarding obinutuzumab and the fact that we did not have the results of his hep b testing. Spoke w/ on call doctor (Rustam Barlow D.O) who instructed us to hold the immunotherapy medication until we have the results. Instructed to call the speciality lab and request the lab be run stat which was done. Awaiting results to appear in system.    Pt refusing to be premedicated w/ diphenhydramine as it is on his list of medication to not take due to prolonged QT. Dr. Barlow ordered loratadine. Pt agreeable to taking diphenhydramine if he has an infusion reaction.

## 2024-09-11 NOTE — PLAN OF CARE
Problem: PAIN - ADULT  Goal: Verbalizes/displays adequate comfort level or baseline comfort level  Description: Interventions:  - Encourage patient to monitor pain and request assistance  - Assess pain using appropriate pain scale  - Administer analgesics based on type and severity of pain and evaluate response  - Implement non-pharmacological measures as appropriate and evaluate response  - Consider cultural and social influences on pain and pain management  - Notify physician/advanced practitioner if interventions unsuccessful or patient reports new pain  Outcome: Progressing     Problem: INFECTION - ADULT  Goal: Absence or prevention of progression during hospitalization  Description: INTERVENTIONS:  - Assess and monitor for signs and symptoms of infection  - Monitor lab/diagnostic results  - Monitor all insertion sites, i.e. indwelling lines, tubes, and drains  - Monitor endotracheal if appropriate and nasal secretions for changes in amount and color  - Stewart appropriate cooling/warming therapies per order  - Administer medications as ordered  - Instruct and encourage patient and family to use good hand hygiene technique  - Identify and instruct in appropriate isolation precautions for identified infection/condition  Outcome: Progressing  Goal: Absence of fever/infection during neutropenic period  Description: INTERVENTIONS:  - Monitor WBC    Outcome: Progressing     Problem: SAFETY ADULT  Goal: Patient will remain free of falls  Description: INTERVENTIONS:  - Educate patient/family on patient safety including physical limitations  - Instruct patient to call for assistance with activity   - Consult OT/PT to assist with strengthening/mobility   - Keep Call bell within reach  - Keep bed low and locked with side rails adjusted as appropriate  - Keep care items and personal belongings within reach  - Initiate and maintain comfort rounds  - Make Fall Risk Sign visible to staff  - Offer Toileting every 2 Hours,  in advance of need  - Initiate/Maintain bed alarm  - Obtain necessary fall risk management equipment: yellow socks, yellow bracelet, bed alarm  - Apply yellow socks and bracelet for high fall risk patients  - Consider moving patient to room near nurses station  Outcome: Progressing  Goal: Maintain or return to baseline ADL function  Description: INTERVENTIONS:  -  Assess patient's ability to carry out ADLs; assess patient's baseline for ADL function and identify physical deficits which impact ability to perform ADLs (bathing, care of mouth/teeth, toileting, grooming, dressing, etc.)  - Assess/evaluate cause of self-care deficits   - Assess range of motion  - Assess patient's mobility; develop plan if impaired  - Assess patient's need for assistive devices and provide as appropriate  - Encourage maximum independence but intervene and supervise when necessary  - Involve family in performance of ADLs  - Assess for home care needs following discharge   - Consider OT consult to assist with ADL evaluation and planning for discharge  - Provide patient education as appropriate  Outcome: Progressing  Goal: Maintains/Returns to pre admission functional level  Description: INTERVENTIONS:  - Perform AM-PAC 6 Click Basic Mobility/ Daily Activity assessment daily.  - Set and communicate daily mobility goal to care team and patient/family/caregiver.   - Collaborate with rehabilitation services on mobility goals if consulted  - Perform Range of Motion 3 times a day.  - Reposition patient every 2 hours.  - Dangle patient 3 times a day  - Stand patient 3 times a day  - Ambulate patient 3 times a day  - Out of bed to chair 3 times a day   - Out of bed for meals 3 times a day  - Out of bed for toileting  - Record patient progress and toleration of activity level   Outcome: Progressing     Problem: DISCHARGE PLANNING  Goal: Discharge to home or other facility with appropriate resources  Description: INTERVENTIONS:  - Identify barriers  to discharge w/patient and caregiver  - Arrange for needed discharge resources and transportation as appropriate  - Identify discharge learning needs (meds, wound care, etc.)  - Arrange for interpretive services to assist at discharge as needed  - Refer to Case Management Department for coordinating discharge planning if the patient needs post-hospital services based on physician/advanced practitioner order or complex needs related to functional status, cognitive ability, or social support system  Outcome: Progressing     Problem: Knowledge Deficit  Goal: Patient/family/caregiver demonstrates understanding of disease process, treatment plan, medications, and discharge instructions  Description: Complete learning assessment and assess knowledge base.  Interventions:  - Provide teaching at level of understanding  - Provide teaching via preferred learning methods  Outcome: Progressing

## 2024-09-11 NOTE — PLAN OF CARE
Problem: PAIN - ADULT  Goal: Verbalizes/displays adequate comfort level or baseline comfort level  Description: Interventions:  - Encourage patient to monitor pain and request assistance  - Assess pain using appropriate pain scale  - Administer analgesics based on type and severity of pain and evaluate response  - Implement non-pharmacological measures as appropriate and evaluate response  - Consider cultural and social influences on pain and pain management  - Notify physician/advanced practitioner if interventions unsuccessful or patient reports new pain  Outcome: Progressing     Problem: SAFETY ADULT  Goal: Patient will remain free of falls  Description: INTERVENTIONS:  - Educate patient/family on patient safety including physical limitations  - Instruct patient to call for assistance with activity   - Consult OT/PT to assist with strengthening/mobility   - Keep Call bell within reach  - Keep bed low and locked with side rails adjusted as appropriate  - Keep care items and personal belongings within reach  - Initiate and maintain comfort rounds  - Make Fall Risk Sign visible to staff  - Offer Toileting every 2 Hours, in advance of need  - Initiate/Maintain bed alarm  - Obtain necessary fall risk management equipment: yellow socks, yellow bracelet, bed alarm  - Apply yellow socks and bracelet for high fall risk patients  - Consider moving patient to room near nurses station  Outcome: Progressing  Goal: Maintain or return to baseline ADL function  Description: INTERVENTIONS:  -  Assess patient's ability to carry out ADLs; assess patient's baseline for ADL function and identify physical deficits which impact ability to perform ADLs (bathing, care of mouth/teeth, toileting, grooming, dressing, etc.)  - Assess/evaluate cause of self-care deficits   - Assess range of motion  - Assess patient's mobility; develop plan if impaired  - Assess patient's need for assistive devices and provide as appropriate  - Encourage  maximum independence but intervene and supervise when necessary  - Involve family in performance of ADLs  - Assess for home care needs following discharge   - Consider OT consult to assist with ADL evaluation and planning for discharge  - Provide patient education as appropriate  Outcome: Progressing  Goal: Maintains/Returns to pre admission functional level  Description: INTERVENTIONS:  - Perform AM-PAC 6 Click Basic Mobility/ Daily Activity assessment daily.  - Set and communicate daily mobility goal to care team and patient/family/caregiver.   - Collaborate with rehabilitation services on mobility goals if consulted  - Perform Range of Motion 3 times a day.  - Reposition patient every 2 hours.  - Dangle patient 3 times a day  - Stand patient 3 times a day  - Ambulate patient 3 times a day  - Out of bed to chair 3 times a day   - Out of bed for meals 3 times a day  - Out of bed for toileting  - Record patient progress and toleration of activity level   Outcome: Progressing     Problem: DISCHARGE PLANNING  Goal: Discharge to home or other facility with appropriate resources  Description: INTERVENTIONS:  - Identify barriers to discharge w/patient and caregiver  - Arrange for needed discharge resources and transportation as appropriate  - Identify discharge learning needs (meds, wound care, etc.)  - Arrange for interpretive services to assist at discharge as needed  - Refer to Case Management Department for coordinating discharge planning if the patient needs post-hospital services based on physician/advanced practitioner order or complex needs related to functional status, cognitive ability, or social support system  Outcome: Progressing     Problem: Knowledge Deficit  Goal: Patient/family/caregiver demonstrates understanding of disease process, treatment plan, medications, and discharge instructions  Description: Complete learning assessment and assess knowledge base.  Interventions:  - Provide teaching at level of  understanding  - Provide teaching via preferred learning methods  Outcome: Progressing

## 2024-09-11 NOTE — PROGRESS NOTES
Progress Note - Hospitalist   Name: Murphy Willams 73 y.o. male I MRN: [id number]  Unit/Bed#: E2 -01 I Date of Admission: 9/2/2024   Date of Service: 9/11/2024 I Hospital Day: 9    Assessment & Plan  Anemia  Patient presented to the hospital with worsening cough and shortness of breath.  He is recently treated for pneumonia.  In addition was found to have acute anemia with at 8.7 on admission, and 5.7 the following day.  Has a distant history of autoimmune hemolytic anemia    Appreciate hematology/oncology recommendations  hemolysis workup generally unremarkable despite positive Narciso test  Given prior history of hemolytic anemia, hematology has recommended treating with steroids.  He is currently on prednisone taper.  Transfused total of 3 units PRBCs this hospitalization  The patient's hemoglobin is improving.  It is thought likely that the patient's progressive anemia is related to his underlying CLL.  Immunotherapy was started last evening.  Flow cytometry pending  Bone marrow biopsy pending  Essential hypertension  BP controlled  Home meds nadolol, losartan and aldactone  Aldactone and losartan on hold given electrolyte issues  CLL (chronic lymphocytic leukemia) (HCC)  Currently follow with Dr. Hanley  Immunotherapy has been started.  Follow-up flow cytometry  Bone marrow biopsy pending  Pulmonary hypertension (HCC)  Severe pulmonary hypertension and tricuspid regurgitation noted on echocardiogram  Cardiology following  Hyponatremia  Acute on chronic  Improving with fluid restriction and p.o. Lasix  Continue to monitor daily BMP  Paroxysmal A-fib (HCC)  Continue nadolol   CTA showing lack of opacification of the left atrial appendage, cannot r/o thrombus  Repeat echocardiogram again atrial scanning performed: No evidence of thrombus  On Pradaxa.  Long QT syndrome type 2  Avoid QT prolonging agents  Continue home nadolol  Vitamin B 12 deficiency  Restart B12 supplement  Pneumonia  This has been treated,  apparently successfully  Rash  Lacy rash over bilateral lower extremities from the thighs to the ankle.  Improved today.  Developed a rash shortly after completing Augmentin therapy  Question autoimmune component given history of CLL with autoimmune hemolytic anemia    Hyperkalemia  Acute development of hyperkalemia on morning of 9/6 at 6.6  Has since been treated with insulin/dextrose/calcium/bicarb x 2    Continue on Lokelma 10 mg daily  Continue on p.o. Lasix 20 mg daily  Holding home losartan and spironolactone  Low potassium diet  Appreciate nephrology recommendations  Monitor daily BMP    Subjective:  The patient is feeling reasonably well.  He did not sleep too well because of medication administration.  He denies any chest pain or shortness of breath.  He ate well for breakfast.  He has no abdominal pain, nausea, or vomiting.    Physical Exam:   Temp:  [97 °F (36.1 °C)-98.2 °F (36.8 °C)] 98.2 °F (36.8 °C)  HR:  [69-81] 70  Resp:  [16-20] 20  BP: (120-165)/(70-84) 143/79    Gen: Well-developed, well-nourished, in no distress.  Neck: Supple.  No lymphadenopathy or goiter.  Heart: Regular rhythm.  I heard no murmur, gallop, or rub.  Lungs: Clear to auscultation and percussion.  No wheezing, rales, or rhonchi.  Abd: Soft with active bowel sounds.  No mass, tenderness, organomegaly.  Extremities: No clubbing, cyanosis, or edema.  No calf tenderness.  Neuro: Alert and oriented.  No focal sign.  Skin: Warm and dry.      LABS:   CBC:   Lab Results   Component Value Date    WBC 70.94 (H) 09/11/2024    HGB 8.6 (L) 09/11/2024    HCT 26.5 (L) 09/11/2024     (H) 09/11/2024     09/11/2024    RBC 2.60 (L) 09/11/2024    MCH 33.1 09/11/2024    MCHC 32.5 09/11/2024    RDW 20.9 (H) 09/11/2024    MPV 9.9 09/11/2024   , CMP:   Lab Results   Component Value Date    SODIUM 132 (L) 09/11/2024    K 4.2 09/11/2024    CL 99 09/11/2024    CO2 27 09/11/2024    BUN 20 09/11/2024    CREATININE 0.64 09/11/2024    CALCIUM 7.9  (L) 09/11/2024    EGFR 98 09/11/2024             VTE Pharmacologic Prophylaxis: Pradaxa  VTE Mechanical Prophylaxis: reason for no mechanical VTE prophylaxis therapeutically anticoagulated.

## 2024-09-11 NOTE — ASSESSMENT & PLAN NOTE
-Suspected in the setting of taking ARB, spironolactone, also concern for hemolytic anemia, CLL  -Continue to remain off offending medications including ARB, spironolactone.  -Potassium level has much improved and remained stable 4.2 today.  -Currently on Lokelma 10 g every other day, Lasix 20 mg daily.  -Again discussed potassium restricted diet although he does not want to follow restricted diet.  -Repeat BMP in AM.

## 2024-09-11 NOTE — PROGRESS NOTES
"1.5 hours into his 4 hour immunotherapy infusion pt said he \"just didn't feel right\", couldn't get comfortable in bed, and was coughing and sounding hoarse, was short of breath and anxious. infusion stopped and pt treated per the hypersensitivity protocol for mild SOB. He has no wheezing and was 95% on RA, put on 2L 02 for shortness of breath and pt comfort. Other vital signs are fine. After the benadryl, hydrocortisone, pepcid, and albuterol neb, pt says he feels better and wants to continue the infusion. He thinks he was just anxious about the infusion, couldn't get comfortable, and was sleep deprived. He was treated for pneumonia 3 weeks ago and said his cough always gets worse at night.  He's still coughing more than he was earlier in the day, but lungs are clear and he's calm. Reached out to oncology on call for orders regarding the immunotherapy infusion.   "

## 2024-09-11 NOTE — NURSING NOTE
Received instruction from Dr. Barlow to resume infusion at 50% rate and contact him with any additional hypersensitivity symptoms.

## 2024-09-11 NOTE — ASSESSMENT & PLAN NOTE
Lacy rash over bilateral lower extremities from the thighs to the ankle.  Improved today.  Developed a rash shortly after completing Augmentin therapy  Question autoimmune component given history of CLL with autoimmune hemolytic anemia

## 2024-09-12 ENCOUNTER — TELEPHONE (OUTPATIENT)
Age: 74
End: 2024-09-12

## 2024-09-12 VITALS
SYSTOLIC BLOOD PRESSURE: 125 MMHG | RESPIRATION RATE: 16 BRPM | BODY MASS INDEX: 28.94 KG/M2 | OXYGEN SATURATION: 98 % | HEART RATE: 69 BPM | HEIGHT: 68 IN | DIASTOLIC BLOOD PRESSURE: 81 MMHG | TEMPERATURE: 97.6 F | WEIGHT: 190.92 LBS

## 2024-09-12 LAB
ABO GROUP BLD: NORMAL
ANION GAP SERPL CALCULATED.3IONS-SCNC: 5 MMOL/L (ref 4–13)
ANISOCYTOSIS BLD QL SMEAR: PRESENT
ATRIAL RATE: 70 BPM
ATRIAL RATE: 70 BPM
BASOPHILS # BLD MANUAL: 0 THOUSAND/UL (ref 0–0.1)
BASOPHILS NFR MAR MANUAL: 0 % (ref 0–1)
BLD GP AB SCN SERPL QL: NEGATIVE
BUN SERPL-MCNC: 16 MG/DL (ref 5–25)
CALCIUM SERPL-MCNC: 7.9 MG/DL (ref 8.4–10.2)
CHLORIDE SERPL-SCNC: 99 MMOL/L (ref 96–108)
CO2 SERPL-SCNC: 29 MMOL/L (ref 21–32)
CREAT SERPL-MCNC: 0.65 MG/DL (ref 0.6–1.3)
EOSINOPHIL # BLD MANUAL: 0 THOUSAND/UL (ref 0–0.4)
EOSINOPHIL NFR BLD MANUAL: 0 % (ref 0–6)
ERYTHROCYTE [DISTWIDTH] IN BLOOD BY AUTOMATED COUNT: 22.6 % (ref 11.6–15.1)
GFR SERPL CREATININE-BSD FRML MDRD: 97 ML/MIN/1.73SQ M
GLUCOSE SERPL-MCNC: 178 MG/DL (ref 65–140)
HCT VFR BLD AUTO: 19.9 % (ref 36.5–49.3)
HGB BLD-MCNC: 6.7 G/DL (ref 12–17)
LDH SERPL-CCNC: 234 U/L (ref 140–271)
LYMPHOCYTES # BLD AUTO: 77.24 THOUSAND/UL (ref 0.6–4.47)
LYMPHOCYTES # BLD AUTO: 88 % (ref 14–44)
MCH RBC QN AUTO: 33.2 PG (ref 26.8–34.3)
MCHC RBC AUTO-ENTMCNC: 33.7 G/DL (ref 31.4–37.4)
MCV RBC AUTO: 99 FL (ref 82–98)
MONOCYTES # BLD AUTO: 0 THOUSAND/UL (ref 0–1.22)
MONOCYTES NFR BLD: 0 % (ref 4–12)
NEUTROPHILS # BLD MANUAL: 9.55 THOUSAND/UL (ref 1.85–7.62)
NEUTS SEG NFR BLD AUTO: 11 % (ref 43–75)
P AXIS: 70 DEGREES
PLATELET # BLD AUTO: 226 THOUSANDS/UL (ref 149–390)
PLATELET BLD QL SMEAR: ADEQUATE
PMV BLD AUTO: 9.9 FL (ref 8.9–12.7)
POLYCHROMASIA BLD QL SMEAR: PRESENT
POTASSIUM SERPL-SCNC: 4.2 MMOL/L (ref 3.5–5.3)
PR INTERVAL: 124 MS
PR INTERVAL: 124 MS
QRS AXIS: -14 DEGREES
QRS AXIS: -29 DEGREES
QRSD INTERVAL: 148 MS
QRSD INTERVAL: 166 MS
QT INTERVAL: 534 MS
QT INTERVAL: 542 MS
QTC INTERVAL: 576 MS
QTC INTERVAL: 585 MS
RBC # BLD AUTO: 2.02 MILLION/UL (ref 3.88–5.62)
RBC MORPH BLD: PRESENT
RH BLD: POSITIVE
SMUDGE CELLS BLD QL SMEAR: PRESENT
SODIUM SERPL-SCNC: 133 MMOL/L (ref 135–147)
SPECIMEN EXPIRATION DATE: NORMAL
T WAVE AXIS: 144 DEGREES
T WAVE AXIS: 98 DEGREES
URATE SERPL-MCNC: 5.1 MG/DL (ref 3.5–8.5)
VARIANT LYMPHS # BLD AUTO: 1 %
VENTRICULAR RATE: 70 BPM
VENTRICULAR RATE: 70 BPM
WBC # BLD AUTO: 86.79 THOUSAND/UL (ref 4.31–10.16)

## 2024-09-12 PROCEDURE — 99232 SBSQ HOSP IP/OBS MODERATE 35: CPT | Performed by: INTERNAL MEDICINE

## 2024-09-12 PROCEDURE — 93005 ELECTROCARDIOGRAM TRACING: CPT

## 2024-09-12 PROCEDURE — 99239 HOSP IP/OBS DSCHRG MGMT >30: CPT | Performed by: INTERNAL MEDICINE

## 2024-09-12 PROCEDURE — 93010 ELECTROCARDIOGRAM REPORT: CPT

## 2024-09-12 PROCEDURE — 86900 BLOOD TYPING SEROLOGIC ABO: CPT | Performed by: INTERNAL MEDICINE

## 2024-09-12 PROCEDURE — 86850 RBC ANTIBODY SCREEN: CPT | Performed by: INTERNAL MEDICINE

## 2024-09-12 PROCEDURE — 83615 LACTATE (LD) (LDH) ENZYME: CPT | Performed by: INTERNAL MEDICINE

## 2024-09-12 PROCEDURE — 86901 BLOOD TYPING SEROLOGIC RH(D): CPT | Performed by: INTERNAL MEDICINE

## 2024-09-12 PROCEDURE — 86922 COMPATIBILITY TEST ANTIGLOB: CPT

## 2024-09-12 PROCEDURE — 93010 ELECTROCARDIOGRAM REPORT: CPT | Performed by: INTERNAL MEDICINE

## 2024-09-12 PROCEDURE — 85027 COMPLETE CBC AUTOMATED: CPT | Performed by: INTERNAL MEDICINE

## 2024-09-12 PROCEDURE — 80048 BASIC METABOLIC PNL TOTAL CA: CPT | Performed by: INTERNAL MEDICINE

## 2024-09-12 PROCEDURE — 84550 ASSAY OF BLOOD/URIC ACID: CPT | Performed by: INTERNAL MEDICINE

## 2024-09-12 PROCEDURE — P9040 RBC LEUKOREDUCED IRRADIATED: HCPCS

## 2024-09-12 PROCEDURE — 86921 COMPATIBILITY TEST INCUBATE: CPT

## 2024-09-12 PROCEDURE — 30233N1 TRANSFUSION OF NONAUTOLOGOUS RED BLOOD CELLS INTO PERIPHERAL VEIN, PERCUTANEOUS APPROACH: ICD-10-PCS | Performed by: INTERNAL MEDICINE

## 2024-09-12 PROCEDURE — 85007 BL SMEAR W/DIFF WBC COUNT: CPT | Performed by: INTERNAL MEDICINE

## 2024-09-12 RX ORDER — ALLOPURINOL 300 MG/1
300 TABLET ORAL DAILY
Qty: 30 TABLET | Refills: 0 | Status: SHIPPED | OUTPATIENT
Start: 2024-09-13

## 2024-09-12 RX ORDER — PREDNISONE 10 MG/1
TABLET ORAL
Qty: 28 TABLET | Refills: 0 | Status: SHIPPED | OUTPATIENT
Start: 2024-09-19

## 2024-09-12 RX ORDER — OMEPRAZOLE 40 MG/1
CAPSULE, DELAYED RELEASE ORAL
Qty: 90 CAPSULE | Refills: 0 | Status: SHIPPED | OUTPATIENT
Start: 2024-09-12 | End: 2024-09-12

## 2024-09-12 RX ORDER — ACETAMINOPHEN 325 MG/1
650 TABLET ORAL EVERY 6 HOURS PRN
Start: 2024-09-12

## 2024-09-12 RX ORDER — LIDOCAINE 50 MG/G
1 PATCH TOPICAL DAILY
Status: DISCONTINUED | OUTPATIENT
Start: 2024-09-12 | End: 2024-09-12 | Stop reason: HOSPADM

## 2024-09-12 RX ORDER — FUROSEMIDE 20 MG
20 TABLET ORAL DAILY
Qty: 30 TABLET | Refills: 0 | Status: SHIPPED | OUTPATIENT
Start: 2024-09-13

## 2024-09-12 RX ADMIN — ALLOPURINOL 300 MG: 300 TABLET ORAL at 08:55

## 2024-09-12 RX ADMIN — NADOLOL 80 MG: 20 TABLET ORAL at 05:39

## 2024-09-12 RX ADMIN — ZOLPIDEM TARTRATE 5 MG: 5 TABLET, COATED ORAL at 02:07

## 2024-09-12 RX ADMIN — Medication 250 MG: at 08:55

## 2024-09-12 RX ADMIN — POLYETHYLENE GLYCOL 3350 17 G: 17 POWDER, FOR SOLUTION ORAL at 08:55

## 2024-09-12 RX ADMIN — ATORVASTATIN CALCIUM 20 MG: 20 TABLET, FILM COATED ORAL at 16:56

## 2024-09-12 RX ADMIN — PREDNISONE 40 MG: 20 TABLET ORAL at 08:55

## 2024-09-12 RX ADMIN — Medication 250 MG: at 08:54

## 2024-09-12 RX ADMIN — CYANOCOBALAMIN TAB 500 MCG 500 MCG: 500 TAB at 08:54

## 2024-09-12 RX ADMIN — LORAZEPAM 0.5 MG: 0.5 TABLET ORAL at 14:08

## 2024-09-12 RX ADMIN — PANTOPRAZOLE SODIUM 40 MG: 40 TABLET, DELAYED RELEASE ORAL at 05:39

## 2024-09-12 RX ADMIN — FUROSEMIDE 20 MG: 20 TABLET ORAL at 08:55

## 2024-09-12 RX ADMIN — OMEGA-3 FATTY ACIDS CAP 1000 MG 1000 MG: 1000 CAP at 08:55

## 2024-09-12 RX ADMIN — DABIGATRAN ETEXILATE MESYLATE 150 MG: 150 CAPSULE ORAL at 08:54

## 2024-09-12 RX ADMIN — LIDOCAINE 1 PATCH: 50 PATCH CUTANEOUS at 05:44

## 2024-09-12 RX ADMIN — MONTELUKAST 10 MG: 10 TABLET, FILM COATED ORAL at 08:55

## 2024-09-12 RX ADMIN — GUAIFENESIN 600 MG: 600 TABLET, EXTENDED RELEASE ORAL at 08:55

## 2024-09-12 NOTE — RESTORATIVE TECHNICIAN NOTE
Restorative Technician Note      Patient Name: Murphy Willams     Restorative Tech Visit Date: 09/12/24  Note Type: Mobility  Patient Position Upon Consult: Bedside chair  Activity Performed: Ambulated; Range of motion  Patient Position at End of Consult: All needs within reach; Bedside chair

## 2024-09-12 NOTE — DISCHARGE SUMMARY
Discharge Summary - Murphy Willams, 1950, [id number]        Admission Date: 9/2/2024  Discharge Date: 9/12/2024      Discharge Diagnosis:   1.  Anemia, most likely related to progressive CLL  2.  Chronic lymphocytic leukemia  3.  Recent pneumonia  4.  Hyperkalemia  5.  Paroxysmal atrial fibrillation  6.  Long QT syndrome type II  7.  Livido reticularis  8.  B12 deficiency  9.  Pulmonary hypertension  10.  Primary hypertension    Consulting Physicians:  1.  Dr. Matias, pulmonary medicine  2.  Dr. Gannon, hematology oncology  3.  Dr. Eric Bueno, cardiology  4.  Dr. Gomez, nephrology  5.  Dr. Johanny Bueno, gastroenterology,    Procedures Performed:   Bone marrow biopsy    HPI: The patient is a 73-year-old man who was recently treated with Augmentin for pneumonia.  He came to the emergency room because he continued to cough.  He had no fever, chest pain, chills, or sputum production.  He is known to have CLL and had a markedly elevated white count.  He also noted a rash on his legs.  He was found to be more anemic.  He was admitted for further care.    Hospital Course: The patient was admitted to the hospital.  CT of the chest revealed a right middle lobe infiltrate.  The patient was treated briefly with antibiotics because it was not clear if this was residual from his previous pneumonia or if it was a new infection.  However, the patient demonstrated no clinical signs of infection and so antibiotics were stopped.    The patient was noted to be progressively anemic.  He required several units of blood.  He was seen by hematology.  The possibility of hemolytic anemia was raised but laboratory evaluation did not support this.  Ultimately, it was concluded that this is more likely related to ration of his CLL.  A bone marrow biopsy was done the results of which are pending.  The patient was treated with high-dose steroids.  This did seem to be somewhat helpful in improving his counts.  Thereafter, he was started  on immunotherapy with Gazyva and Decadron.  The patient tolerated this therapy pretty well.  On the day of discharge his hemoglobin was only 6.6 and he therefore received 1 additional unit of blood.  He will be followed carefully by hematology as an outpatient.    The patient had a rash at the time of admission.  This appeared to be livido reticularis.  It was not clear if this was related to the patient's Augmentin therapy or to his CLL.  In either case it improved substantially during his hospitalization.    The patient has a history of paroxysmal atrial fibrillation.  He is on nadolol and Pradaxa for this.  He has hypertension which was well-controlled on nadolol.    The patient did have hyperkalemia.  This required withdrawal of spironolactone and losartan.  The patient was treated with lokima during his stay.  He was started on furosemide.  With these measures his potassium stabilized.  He was discharged off of spironolactone and losartan.  Careful monitoring of his potassium will be necessary.    At the time of discharge the patient was feeling well.  Vital signs were stable.  Lungs were clear.  Cardiac exam revealed a regular rhythm.  The abdomen was soft and nontender.  There was no edema.    Disposition: The patient was discharged home on September 12.  Diet and activity will be as tolerated.  He has arranged follow-up with his personal physician, Dr. Pravin Ortega.  He will also be followed carefully by [facility] hematology.    Discharge instructions/Information to patient and family:   See after visit summary for information provided to patient and family.      Provisions for Follow-Up Care:  See after visit summary for information related to follow-up care and any pertinent home health orders.      Planned Readmission: No    Discharge Statement   I spent 45 minutes discharging the patient. This time was spent on the day of discharge. I had direct contact with the patient on the day of discharge.      Discharge Medications:  See after visit summary for reconciled discharge medications provided to patient and family.

## 2024-09-12 NOTE — ASSESSMENT & PLAN NOTE
-Suspected in the setting of taking ARB, spironolactone, also concern for CLL  -Continue to holding ARB, spironolactone now and on discharge.  -Potassium level remains stable 4.2 today.    -Currently on Lokelma 10 g every other day, Lasix 20 mg daily.  Continue Lasix on discharge, monitor off Lokelma on discharge with repeat BMP in 1 week.  -Again discussed potassium restricted diet

## 2024-09-12 NOTE — QUICK NOTE
Nursing reached out that patient with concerns over possibly receiving IV benadryl secondary to his history of prolonged QT  Per chart review, pt with dual-chamber/biventricular ICD placed 3/8/2023 secondary to prolonged QT, maintained on nadolol  Pt reports benadryl is on his home medication list given to him by Dr. Bueno as a medication he should try to avoid secondary to this  Of note, patient did receive one time dose of IV benadryl yesterday with no reported issue  New EKG obtained, QTc currently 576, baseline appears to be 580  Discussed this with patient at the bedside  Pt agreeable to received one time dose of IV benadryl   Daily EKG added for close monitoring of QTc

## 2024-09-12 NOTE — PROGRESS NOTES
RN confirmed chemotherapy consent signed. Chemotherapy checklist completed and co-signed.  Premedications administered (Decadron, Diphenhydramine and Acetaminophen).  Pt agreeable to start Obinutuzamab infusion.  Infusion started at 2146 at a rate of 25 mg/hour and to titrate dose every 30 minutes by increasing rate by 50 mg/hr.  Vital signs stable.  Patient appears comfortable, watching television and eating a snack.

## 2024-09-12 NOTE — ASSESSMENT & PLAN NOTE
Lacy rash over bilateral lower extremities from the thighs to the ankle.  Improved today.  Developed a rash shortly after completing Augmentin therapy

## 2024-09-12 NOTE — TELEPHONE ENCOUNTER
Pt calling from the Hospital.    He is VERY upset, crying and begging that Dr. Hanley call him personably.    He wants to know if he will be receiving any additional  obinutuzumab today.     He states no one is telling him the plan of care.     He is to rcv one more unit of blood today for a hgb of 6.7    If Dr. Hanley could call this pt, it would be very helpful.

## 2024-09-12 NOTE — PROGRESS NOTES
Progress Note - Hospitalist   Name: Murphy Willams 73 y.o. male I MRN: [id number]  Unit/Bed#: E2 -01 I Date of Admission: 9/2/2024   Date of Service: 9/12/2024 I Hospital Day: 10    Assessment & Plan  Anemia  It is thought likely that the patient's progressive anemia is related to his underlying CLL.  Immunotherapy was started   Flow cytometry pending  Bone marrow biopsy pending  Patient's hemoglobin is 6 today.  He will receive 1 additional unit of blood.  Essential hypertension  BP controlled  Home meds nadolol, losartan and aldactone  Aldactone and losartan on hold given electrolyte issues  These will remain on hold at the time of discharge.  CLL (chronic lymphocytic leukemia) (HCC)  Currently follow with Dr. Hanley  Immunotherapy has been started.  Follow-up flow cytometry  Bone marrow biopsy pending  Pulmonary hypertension (HCC)  Severe pulmonary hypertension and tricuspid regurgitation noted on echocardiogram  Cardiology following  Hyponatremia  Acute on chronic  Improving with fluid restriction and p.o. Lasix  Continue to monitor daily BMP  Paroxysmal A-fib (HCC)  Continue nadolol   CTA showing lack of opacification of the left atrial appendage, cannot r/o thrombus  Repeat echocardiogram again atrial scanning performed: No evidence of thrombus  On Pradaxa.  Long QT syndrome type 2  Avoid QT prolonging agents  Continue home nadolol  Vitamin B 12 deficiency  Restart B12 supplement  Pneumonia  This has been treated, apparently successfully  Rash  Lacy rash over bilateral lower extremities from the thighs to the ankle.  Improved today.  Developed a rash shortly after completing Augmentin therapy    Hyperkalemia  Acute development of hyperkalemia on morning of 9/6 at 6.6  Has since been treated with insulin/dextrose/calcium/bicarb x 2    Continue on Lokelma 10 mg daily  Continue on p.o. Lasix 20 mg daily  Holding home losartan and spironolactone  Low potassium diet  Appreciate nephrology  recommendations  Monitor daily BMP        Subjective:  The patient feels pretty well.  He says that he is depressed because his hemoglobin dropped.  He was hoping to go home today.  He has no chest pain, shortness of breath, abdominal pain, nausea, or vomiting.    Physical Exam:   Temp:  [97 °F (36.1 °C)-97.8 °F (36.6 °C)] 97.8 °F (36.6 °C)  HR:  [69-71] 71  Resp:  [16-20] 18  BP: (125-157)/(59-88) 157/88    Gen: Well-developed, well-nourished, in no distress.  Neck: Supple.  No lymphadenopathy, goiter, or bruit.  Heart: Regular rhythm.  I heard no murmur, gallop, or rub.  Lungs: Clear to auscultation and percussion.  No wheezing, rales, or rhonchi.  Abd: Soft with active bowel sounds.  No mass, tenderness, organomegaly.  Extremities: No clubbing, cyanosis, or edema.  No calf tenderness.  Neuro: Alert and oriented.  No focal sign.  Skin: Warm and dry.  Pale.      LABS:   CBC:   Lab Results   Component Value Date    WBC 86.79 (H) 09/12/2024    HGB 6.7 (L) 09/12/2024    HCT 19.9 (L) 09/12/2024    MCV 99 (H) 09/12/2024     09/12/2024    RBC 2.02 (L) 09/12/2024    MCH 33.2 09/12/2024    MCHC 33.7 09/12/2024    RDW 22.6 (H) 09/12/2024    MPV 9.9 09/12/2024   , CMP:   Lab Results   Component Value Date    SODIUM 133 (L) 09/12/2024    K 4.2 09/12/2024    CL 99 09/12/2024    CO2 29 09/12/2024    BUN 16 09/12/2024    CREATININE 0.65 09/12/2024    CALCIUM 7.9 (L) 09/12/2024    EGFR 97 09/12/2024             VTE Pharmacologic Prophylaxis: Pradaxa  VTE Mechanical Prophylaxis: reason for no mechanical VTE prophylaxis therapeutically anticoagulated

## 2024-09-12 NOTE — PROGRESS NOTES
Progress Note - Nephrology   Name: Murphy Willams 73 y.o. male I MRN: [id number]  Unit/Bed#: E2 -01 I Date of Admission: 9/2/2024   Date of Service: 9/12/2024 I Hospital Day: 10     Assessment & Plan  Hyperkalemia  -Suspected in the setting of taking ARB, spironolactone, also concern for CLL  -Continue to holding ARB, spironolactone now and on discharge.  -Potassium level remains stable 4.2 today.    -Currently on Lokelma 10 g every other day, Lasix 20 mg daily.  Continue Lasix on discharge, monitor off Lokelma on discharge with repeat BMP in 1 week.  -Again discussed potassium restricted diet  Hyponatremia  -Suspected some element of hypervolemia given cardiomyopathy, severe TR  -Serum sodium relatively stable 133 today.  -Patient refuses to follow fluid restriction and insist to be on regular diet without fluid restriction.   -Monitor with current diuretics.  CLL (chronic lymphocytic leukemia) (HCC)  CLL with severe anemia, mild leukocytosis, hematology following.  Status post bone marrow biopsy on 9/9/2024, results to follow.  -Patient is started on chemo as per hematology.  Pulmonary hypertension (HCC)  Volume status, echo shows EF 55%, severe pulmonary hypertension, severe TR, IVC not well-visualized.  -Currently remains on room air.  -Continue Lasix 20 mg daily.  Anemia  Severe anemia, initially suspected to be hemolytic anemia although seems to be more likely secondary to progressive CLL as per hematology.  On repeat steroid taper as per hematology..  -Transfuse as needed for hemoglobin less than 7.  -Hemoglobin remains low, blood transfusion as per primary team today. continue to monitor    Discussed above plan detail with primary team attending regarding continue to avoid ARB, spironolactone on discharge, consider repeat BMP in 1 week after discharge.  Overall stable from renal standpoint.  We will sign off.  Please call with any questions.  They agree with above  recommendations.    SUBJECTIVE:  Patient seen and examined at bedside.  Denies any nausea or vomiting.  Denies any worsening shortness of breath.    OBJECTIVE:  Current Weight: Weight - Scale: 86.6 kg (190 lb 14.7 oz)  Vitals:    09/12/24 0724   BP: 157/88   Pulse: 71   Resp: 18   Temp: 97.8 °F (36.6 °C)   SpO2: 98%       Intake/Output Summary (Last 24 hours) at 9/12/2024 1111  Last data filed at 9/12/2024 0215  Gross per 24 hour   Intake 728 ml   Output --   Net 728 ml     Wt Readings from Last 3 Encounters:   09/11/24 86.6 kg (190 lb 14.7 oz)   08/28/24 86.6 kg (191 lb)   08/20/24 87 kg (191 lb 12.8 oz)     Temp Readings from Last 3 Encounters:   09/12/24 97.8 °F (36.6 °C) (Temporal)   08/28/24 98.4 °F (36.9 °C) (Tympanic)   08/20/24 98.6 °F (37 °C) (Tympanic)     BP Readings from Last 3 Encounters:   09/12/24 157/88   08/28/24 102/62   08/20/24 136/72     Pulse Readings from Last 3 Encounters:   09/12/24 71   08/28/24 93   08/20/24 87        Physical Examination:  Eyes: Mild conjunctival pallor present  Neck: No obvious lymphadenopathy appreciated  Respiratory: Bilateral air entry present  CVS: No significant edema  GI: Soft, nondistended  CNS: Active, alert, follows commands  Skin: No new rash  Musculoskeletal: No obvious new gross deformity noted    Medications:    Current Facility-Administered Medications:     acetaminophen (TYLENOL) tablet 650 mg, 650 mg, Oral, Q6H PRN, Cyril Nick MD, 650 mg at 09/09/24 0452    albuterol (PROVENTIL HFA,VENTOLIN HFA) inhaler 2 puff, 2 puff, Inhalation, Q4H PRN, Cyril Nick MD    allopurinol (ZYLOPRIM) tablet 300 mg, 300 mg, Oral, Daily, Aspen Garcia MD, 300 mg at 09/12/24 0855    alteplase (CATHFLO) injection 2 mg, 2 mg, Intracatheter, Q1MIN PRN, Aspen Garcia MD    alteplase (CATHFLO) injection 2 mg, 2 mg, Intracatheter, Q1MIN PRN, Aspen Garcia MD    aluminum-magnesium hydroxide-simethicone (MAALOX) oral suspension 30 mL, 30 mL, Oral, Q6H PRN, Cyril  Maye Nick MD, 30 mL at 09/03/24 1924    ascorbic acid (VITAMIN C) tablet 250 mg, 250 mg, Oral, Daily, Imelda Reno PA-C, 250 mg at 09/12/24 0854    atorvastatin (LIPITOR) tablet 20 mg, 20 mg, Oral, Daily With Dinner, Cyril Nick MD, 20 mg at 09/11/24 1656    benzonatate (TESSALON PERLES) capsule 100 mg, 100 mg, Oral, TID PRN, Cyril Nick MD, 100 mg at 09/09/24 2135    cyanocobalamin (VITAMIN B-12) tablet 500 mcg, 500 mcg, Oral, Daily, Imelda Reno PA-C, 500 mcg at 09/12/24 0854    dabigatran etexilate (PRADAXA) capsule 150 mg, 150 mg, Oral, Q12H CHET, Bhavesh Jarvis MD, 150 mg at 09/12/24 0854    diphenhydrAMINE (BENADRYL) 25 mg in sodium chloride 0.9 % 50 mL IVPB, 25 mg, Intravenous, Once, Aspen Garcia MD    fish oil capsule 1,000 mg, 1,000 mg, Oral, Daily, Imelda Reno PA-C, 1,000 mg at 09/12/24 0855    furosemide (LASIX) tablet 20 mg, 20 mg, Oral, Daily, Titus Portillo DO, 20 mg at 09/12/24 0855    guaiFENesin (MUCINEX) 12 hr tablet 600 mg, 600 mg, Oral, BID, Cyril Nick MD, 600 mg at 09/12/24 0855    lidocaine (LIDODERM) 5 % patch 1 patch, 1 patch, Topical, Daily, Fritz Cross PA-C, 1 patch at 09/12/24 0544    loratadine (CLARITIN) tablet 10 mg, 10 mg, Oral, Daily PRN, Rustam Barlow DO    LORazepam (ATIVAN) tablet 0.5 mg, 0.5 mg, Oral, Q8H PRN, Titus Portillo DO, 0.5 mg at 09/11/24 2008    montelukast (SINGULAIR) tablet 10 mg, 10 mg, Oral, Daily, Cyril Nick MD, 10 mg at 09/12/24 0855    nadolol (CORGARD) tablet 80 mg, 80 mg, Oral, Early Morning, Titus Portillo DO, 80 mg at 09/12/24 0539    pantoprazole (PROTONIX) EC tablet 40 mg, 40 mg, Oral, Early Morning, Cyril Nick MD, 40 mg at 09/12/24 0539    polyethylene glycol (MIRALAX) packet 17 g, 17 g, Oral, Daily PRN, Missy Guevara PA-C, 17 g at 09/07/24 2117    polyethylene glycol (MIRALAX) packet 17 g, 17 g, Oral, Daily, Titus Portillo DO, 17 g at 09/12/24 0855    [START ON  9/19/2024] predniSONE tablet 10 mg, 10 mg, Oral, Daily, Aspen Garcia MD    [START ON 9/15/2024] predniSONE tablet 20 mg, 20 mg, Oral, Daily, Aspen Garcia MD    predniSONE tablet 40 mg, 40 mg, Oral, Daily, Aspen Garcia MD, 40 mg at 09/12/24 0855    saccharomyces boulardii (FLORASTOR) capsule 250 mg, 250 mg, Oral, BID, Imelda Reno PA-C, 250 mg at 09/12/24 0855    senna (SENOKOT) tablet 8.6 mg, 1 tablet, Oral, HS, Titus Portillo DO, 8.6 mg at 09/11/24 2212    sodium chloride 0.9 % infusion, 50 mL/hr, Intravenous, Continuous, Aspen Garcia MD, Stopped at 09/12/24 0903    Sodium Zirconium Cyclosilicate (Lokelma) 10 g, 10 g, Oral, Every Other Day, Hernando Blancas DO, 10 g at 09/11/24 0942    trimethobenzamide (TIGAN) IM injection 200 mg, 200 mg, Intramuscular, Q6H PRN, Cyril Nick MD, 200 mg at 09/02/24 1806    zolpidem (AMBIEN) tablet 5 mg, 5 mg, Oral, HS, Cyril Nick MD, 5 mg at 09/12/24 0207    Laboratory Results:  Results from last 7 days   Lab Units 09/12/24  0515 09/11/24  0508 09/10/24  0814 09/10/24  0447 09/09/24  0421 09/08/24  0851 09/08/24  0455 09/07/24  0920 09/07/24  0539 09/06/24  1218   WBC Thousand/uL 86.79* 70.94* 165.36*  --  132.10* 240.15* 232.30* 171.50*  --   --    HEMOGLOBIN g/dL 6.7* 8.6* 8.7*  --  7.6* 6.5* 11.0* 6.5*  --   --    HEMATOCRIT % 19.9* 26.5* 26.9*  --  22.5* 22.0* 30.0* 22.0*  --   --    PLATELETS Thousands/uL 226 183 239  --  176 265 340 205  --   --    SODIUM mmol/L 133* 132*  --  132* 133*  --  133*  --  130* 128*   POTASSIUM mmol/L 4.2 4.2  --  4.1 4.7  --  4.6  --  5.2 5.6*   CHLORIDE mmol/L 99 99  --  98 97  --  99  --  98 98   CO2 mmol/L 29 27  --  29 31  --  29  --  28 26   BUN mg/dL 16 20  --  21 20  --  22  --  24 24   CREATININE mg/dL 0.65 0.64  --  0.63 0.72  --  0.77  --  0.86 0.78   CALCIUM mg/dL 7.9* 7.9*  --  7.8* 8.1*  --  7.9*  --  8.7 8.8       IR biopsy bone marrow   Final Result by Raul Bueno MD (09/09 9321)  "  Impression:      Technically successful image guided bone marrow aspiration and core biopsy         Workstation performed: [id number]         CT cardiac w pulmonary vein mapping   Final Result by Walker Hensley MD (09/04 1548)      No thrombus in the left atrial appendage.            Workstation performed: [id number]         US abdomen complete   Final Result by Emir Krueger MD (09/04 1122)      Mild hepatomegaly.. Possible 2 mm hemangioma.   Moderate splenomegaly.   Gallstone in the gallbladder.               Workstation performed: [id number]         CTA ED chest PE study   Final Result by Katia Patel MD (09/02 0841)      No pulmonary embolus.      Right middle lobe pneumonia.      Lack of opacification of the left atrial appendage. It cannot be determined if this is due to nonopacified blood in the nondependent portion of the left atrium or left atrial appendage thrombus.      Mild splenomegaly and minimally enlarged right axillary node, likely due to the patient's history of CLL.      The study was marked in EPIC for immediate notification.            Workstation performed: [id number]             Portions of the record may have been created with voice recognition software. Occasional wrong word or \"sound a like\" substitutions may have occurred due to the inherent limitations of voice recognition software. Read the chart carefully and recognize, using context, where substitutions have occurred.    "

## 2024-09-12 NOTE — TELEPHONE ENCOUNTER
"Good afternoon Dr. Hanley, this patient requested that I get in touch with you about his obinutuzumab treatments.  Apparently he needed Benadryl due to a reaction after his first treatment and expressed concern of potential cardiac risks of Benadryl as he does have long QT syndrome.  Benadryl poses a \"conditional risk\" meaning that it could be associated prolongation of the QTc interval under certain conditions such as hypokalemia etc.  He wanted me to reach out to to discuss his case, to see if you have any cardiac concerns about his medications or treatments.  I believe you are seeing him in the near future and then I am seeing him as well.  Thank you so much.  "

## 2024-09-12 NOTE — ASSESSMENT & PLAN NOTE
It is thought likely that the patient's progressive anemia is related to his underlying CLL.  Immunotherapy was started   Flow cytometry pending  Bone marrow biopsy pending  Patient's hemoglobin is 6 today.  He will receive 1 additional unit of blood.

## 2024-09-12 NOTE — ASSESSMENT & PLAN NOTE
BP controlled  Home meds nadolol, losartan and aldactone  Aldactone and losartan on hold given electrolyte issues  These will remain on hold at the time of discharge.

## 2024-09-12 NOTE — ASSESSMENT & PLAN NOTE
Severe anemia, initially suspected to be hemolytic anemia although seems to be more likely secondary to progressive CLL as per hematology.  On repeat steroid taper as per hematology..  -Transfuse as needed for hemoglobin less than 7.  -Hemoglobin remains low, blood transfusion as per primary team today. continue to monitor

## 2024-09-12 NOTE — TELEPHONE ENCOUNTER
Pt called asking to speak with Dr Bueno  Pt has received two obinituzumab treatments ordered by Dr Hanley  Had one Tues and Wed night    Pt had an reaction to the first treatment and needed benadryl which was very effective when given prior to the second treatment.   Pt concerned due to the cardiac risks of Benadryl.  An EKG was done last night and was advised its less risky develop prolonged QT, than reaction to the treatment.     Pt unsure how many more treatments he will need and asked Dr Bueno to reach out to the [facility].  Pt has an appt on Tuesday with oncology Dr Hanley   And Dr Bueno Thursday. I asked pt to discuss in office, but he asked me to reach out to Dr Bueno now.     Pt expecting to be d/c today

## 2024-09-12 NOTE — ASSESSMENT & PLAN NOTE
-Suspected some element of hypervolemia given cardiomyopathy, severe TR  -Serum sodium relatively stable 133 today.  -Patient refuses to follow fluid restriction and insist to be on regular diet without fluid restriction.   -Monitor with current diuretics.

## 2024-09-13 ENCOUNTER — DOCUMENTATION (OUTPATIENT)
Dept: CARDIOLOGY CLINIC | Facility: CLINIC | Age: 74
End: 2024-09-13

## 2024-09-13 ENCOUNTER — RA CDI HCC (OUTPATIENT)
Dept: OTHER | Facility: HOSPITAL | Age: 74
End: 2024-09-13

## 2024-09-13 DIAGNOSIS — C91.10 CLL (CHRONIC LYMPHOCYTIC LEUKEMIA) (HCC): Primary | ICD-10-CM

## 2024-09-13 LAB
ABO GROUP BLD BPU: NORMAL
BPU ID: NORMAL
CROSSMATCH: NORMAL
SCAN RESULT: NORMAL
UNIT DISPENSE STATUS: NORMAL
UNIT PRODUCT CODE: NORMAL
UNIT PRODUCT VOLUME: 350 ML
UNIT RH: NORMAL

## 2024-09-13 NOTE — PROGRESS NOTES
MD Eric Duque, DO Dieter Reyes,    I was seeing this patient while currently admitted and he was very insistent that I reach out to you to keep you in loop.  He has progressive CLL and hematology has started him on chemo this admission.  Has severe anemia, also had severe hyperkalemia and due to same we had to hold his ARB, spironolactone.  I advised primary team to keep holding ARB and spironolactone on discharge which you can eventually start as outpatient.  Currently he is on Lokelma and Lasix to maintain potassium level.  Lokelma will be discontinued and will continue Lasix 20 mg daily on discharge.  ABIDA Vasquez

## 2024-09-13 NOTE — TELEPHONE ENCOUNTER
Received another call from pt very worried about his care plan that was set for him while being in the hospital versus his care plan outpatient. He stated he is worried to get another infusion without taking the benadryl and he stated he does not understand why the hospital would give him the benadryl if it was not safe for him to take with his cardiac hx. He stated he is also prescribed prednisone to start taking on 9/19 and is not sure how this will react with his infusion treatments. Pt is very confused about his care plan and would like a call from either Dr. Bueno or Dr. Hanley to clarify.    He also stated while being in the hospital, he was taken off his losartan and spironolactone and was placed on lasix and allopurinol. Pt would like to know if this is okay.     Please review and advise.

## 2024-09-13 NOTE — TELEPHONE ENCOUNTER
Placed call to patient. The first time they used a steroid medication and NO benadryl in which he had a reaction so the second time they used benadryl and he did not have a reaction. He states he will discuss further with Dr. Hanley on Tuesday 9/17 when he sees him.

## 2024-09-13 NOTE — TELEPHONE ENCOUNTER
Please let patient know I did not call him back yesterday as I was waiting for Dr. Hanley to reply.  It is okay to take Benadryl from cardiac standpoint, but Dr. Hanley does not want to use Benadryl and would rather use a steroid before prepping the patient along with Pepcid and Tylenol on the day of his medication infusion.  Okay to take Lasix and allopurinol.  He may have the hospital doctors call Dr. Elizabeth hand me if there are any questions or concerns.  Dr. Hanley will be in touch with the rounding hematologist.  Thank you.

## 2024-09-16 ENCOUNTER — TRANSITIONAL CARE MANAGEMENT (OUTPATIENT)
Dept: FAMILY MEDICINE CLINIC | Facility: CLINIC | Age: 74
End: 2024-09-16

## 2024-09-16 ENCOUNTER — APPOINTMENT (OUTPATIENT)
Dept: LAB | Age: 74
End: 2024-09-16
Payer: MEDICARE

## 2024-09-16 DIAGNOSIS — D59.10 AUTOIMMUNE HEMOLYTIC ANEMIA (HCC): ICD-10-CM

## 2024-09-16 DIAGNOSIS — C91.10 CLL (CHRONIC LYMPHOCYTIC LEUKEMIA) (HCC): ICD-10-CM

## 2024-09-16 LAB
ALBUMIN SERPL BCG-MCNC: 4 G/DL (ref 3.5–5)
ALP SERPL-CCNC: 99 U/L (ref 34–104)
ALT SERPL W P-5'-P-CCNC: 72 U/L (ref 7–52)
ANION GAP SERPL CALCULATED.3IONS-SCNC: 8 MMOL/L (ref 4–13)
ANISOCYTOSIS BLD QL SMEAR: PRESENT
AST SERPL W P-5'-P-CCNC: 34 U/L (ref 13–39)
BASOPHILS # BLD MANUAL: 0 THOUSAND/UL (ref 0–0.1)
BASOPHILS NFR MAR MANUAL: 0 % (ref 0–1)
BILIRUB SERPL-MCNC: 3.01 MG/DL (ref 0.2–1)
BUN SERPL-MCNC: 14 MG/DL (ref 5–25)
CALCIUM SERPL-MCNC: 8.9 MG/DL (ref 8.4–10.2)
CHLORIDE SERPL-SCNC: 96 MMOL/L (ref 96–108)
CO2 SERPL-SCNC: 27 MMOL/L (ref 21–32)
CREAT SERPL-MCNC: 0.65 MG/DL (ref 0.6–1.3)
DAT POLY-SP REAG RBC QL: NEGATIVE
EOSINOPHIL # BLD MANUAL: 0 THOUSAND/UL (ref 0–0.4)
EOSINOPHIL NFR BLD MANUAL: 0 % (ref 0–6)
ERYTHROCYTE [DISTWIDTH] IN BLOOD BY AUTOMATED COUNT: 25.4 % (ref 11.6–15.1)
GFR SERPL CREATININE-BSD FRML MDRD: 97 ML/MIN/1.73SQ M
GLUCOSE P FAST SERPL-MCNC: 111 MG/DL (ref 65–99)
HCT VFR BLD AUTO: 26 % (ref 36.5–49.3)
HGB BLD-MCNC: 9.5 G/DL (ref 12–17)
IGA SERPL-MCNC: 387 MG/DL (ref 66–433)
IGG SERPL-MCNC: 965 MG/DL (ref 635–1741)
IGM SERPL-MCNC: 41 MG/DL (ref 45–281)
LDH SERPL-CCNC: 368 U/L (ref 140–271)
LYMPHOCYTES # BLD AUTO: 11.03 THOUSAND/UL (ref 0.6–4.47)
LYMPHOCYTES # BLD AUTO: 54 % (ref 14–44)
MCH RBC QN AUTO: 38.2 PG (ref 26.8–34.3)
MCHC RBC AUTO-ENTMCNC: 36.5 G/DL (ref 31.4–37.4)
MCV RBC AUTO: 104 FL (ref 82–98)
MICROCYTES BLD QL AUTO: PRESENT
MONOCYTES # BLD AUTO: 0.4 THOUSAND/UL (ref 0–1.22)
MONOCYTES NFR BLD: 2 % (ref 4–12)
NEUTROPHILS # BLD MANUAL: 8.62 THOUSAND/UL (ref 1.85–7.62)
NEUTS BAND NFR BLD MANUAL: 2 % (ref 0–8)
NEUTS SEG NFR BLD AUTO: 41 % (ref 43–75)
PLATELET # BLD AUTO: 260 THOUSANDS/UL (ref 149–390)
PLATELET BLD QL SMEAR: ADEQUATE
PMV BLD AUTO: 10.5 FL (ref 8.9–12.7)
POLYCHROMASIA BLD QL SMEAR: PRESENT
POTASSIUM SERPL-SCNC: 4.3 MMOL/L (ref 3.5–5.3)
PROT SERPL-MCNC: 7.2 G/DL (ref 6.4–8.4)
RBC # BLD AUTO: 2.49 MILLION/UL (ref 3.88–5.62)
RBC MORPH BLD: PRESENT
RETICS # AUTO: ABNORMAL 10*3/UL (ref 14356–105094)
RETICS # CALC: 6.92 % (ref 0.37–1.87)
SMUDGE CELLS BLD QL SMEAR: PRESENT
SODIUM SERPL-SCNC: 131 MMOL/L (ref 135–147)
URATE SERPL-MCNC: 4.2 MG/DL (ref 3.5–8.5)
VARIANT LYMPHS # BLD AUTO: 1 %
WBC # BLD AUTO: 20.05 THOUSAND/UL (ref 4.31–10.16)

## 2024-09-16 PROCEDURE — 86880 COOMBS TEST DIRECT: CPT

## 2024-09-16 PROCEDURE — 82232 ASSAY OF BETA-2 PROTEIN: CPT

## 2024-09-16 PROCEDURE — 85007 BL SMEAR W/DIFF WBC COUNT: CPT

## 2024-09-16 PROCEDURE — 85045 AUTOMATED RETICULOCYTE COUNT: CPT

## 2024-09-16 PROCEDURE — 36415 COLL VENOUS BLD VENIPUNCTURE: CPT

## 2024-09-16 PROCEDURE — 85027 COMPLETE CBC AUTOMATED: CPT

## 2024-09-17 ENCOUNTER — TELEPHONE (OUTPATIENT)
Dept: HEMATOLOGY ONCOLOGY | Facility: CLINIC | Age: 74
End: 2024-09-17

## 2024-09-17 ENCOUNTER — OFFICE VISIT (OUTPATIENT)
Dept: HEMATOLOGY ONCOLOGY | Facility: CLINIC | Age: 74
End: 2024-09-17
Payer: MEDICARE

## 2024-09-17 ENCOUNTER — TELEPHONE (OUTPATIENT)
Dept: FAMILY MEDICINE CLINIC | Facility: CLINIC | Age: 74
End: 2024-09-17

## 2024-09-17 VITALS
DIASTOLIC BLOOD PRESSURE: 78 MMHG | WEIGHT: 187 LBS | HEIGHT: 68 IN | OXYGEN SATURATION: 97 % | BODY MASS INDEX: 28.34 KG/M2 | RESPIRATION RATE: 18 BRPM | TEMPERATURE: 98 F | SYSTOLIC BLOOD PRESSURE: 126 MMHG | HEART RATE: 94 BPM

## 2024-09-17 DIAGNOSIS — D64.9 ANEMIA, UNSPECIFIED TYPE: ICD-10-CM

## 2024-09-17 DIAGNOSIS — F51.01 PRIMARY INSOMNIA: ICD-10-CM

## 2024-09-17 DIAGNOSIS — E80.6 HYPERBILIRUBINEMIA: Primary | ICD-10-CM

## 2024-09-17 DIAGNOSIS — I27.20 PULMONARY HYPERTENSION (HCC): ICD-10-CM

## 2024-09-17 DIAGNOSIS — N40.1 BPH WITH OBSTRUCTION/LOWER URINARY TRACT SYMPTOMS: ICD-10-CM

## 2024-09-17 DIAGNOSIS — M19.90 ARTHRITIS: ICD-10-CM

## 2024-09-17 DIAGNOSIS — N13.8 BPH WITH OBSTRUCTION/LOWER URINARY TRACT SYMPTOMS: ICD-10-CM

## 2024-09-17 DIAGNOSIS — Z95.0 PACEMAKER: ICD-10-CM

## 2024-09-17 DIAGNOSIS — R16.2 HEPATOSPLENOMEGALY: ICD-10-CM

## 2024-09-17 DIAGNOSIS — I48.0 PAROXYSMAL A-FIB (HCC): ICD-10-CM

## 2024-09-17 DIAGNOSIS — C91.10 CLL (CHRONIC LYMPHOCYTIC LEUKEMIA) (HCC): Primary | ICD-10-CM

## 2024-09-17 DIAGNOSIS — I50.42 CHRONIC COMBINED SYSTOLIC AND DIASTOLIC CONGESTIVE HEART FAILURE (HCC): ICD-10-CM

## 2024-09-17 PROCEDURE — 99215 OFFICE O/P EST HI 40 MIN: CPT | Performed by: INTERNAL MEDICINE

## 2024-09-17 PROCEDURE — G2211 COMPLEX E/M VISIT ADD ON: HCPCS | Performed by: INTERNAL MEDICINE

## 2024-09-17 RX ORDER — ZOLPIDEM TARTRATE 5 MG/1
TABLET ORAL
Qty: 90 TABLET | Refills: 0 | OUTPATIENT
Start: 2024-09-17

## 2024-09-17 RX ORDER — ZOLPIDEM TARTRATE 5 MG/1
TABLET ORAL
Qty: 90 TABLET | Refills: 0 | Status: CANCELLED | OUTPATIENT
Start: 2024-09-17

## 2024-09-17 RX ORDER — ZOLPIDEM TARTRATE 5 MG/1
TABLET ORAL
Qty: 90 TABLET | Refills: 0 | Status: SHIPPED | OUTPATIENT
Start: 2024-09-17 | End: 2024-09-19 | Stop reason: SDUPTHER

## 2024-09-17 NOTE — PATIENT INSTRUCTIONS
Weekly blood work 1 or 2 days prior to Gazyva.  He will have Gazyva once a week for 3 weeks and once a month after that.  Patient has signed informed consent for Gazyva plus venetoclax that he initialed  today.  Follow-up in 2 weeks.  Patient will have EKG at his cardiologist's office on Thursday

## 2024-09-17 NOTE — TELEPHONE ENCOUNTER
Reason for call:   [x] Refill   [] Prior Auth  [] Other:     Office:   [x] PCP/Provider -   [] Specialty/Provider -     Medication: Zolpidem    Dose/Frequency: 5 mg tablet. Take 1/2-1 full tablet at bedtime PRN for sleep.     Quantity: 90    Pharmacy: [address] [phone]     Does the patient have enough for 3 days?   [] Yes   [x] No - Send as HP to POD

## 2024-09-17 NOTE — TELEPHONE ENCOUNTER
Please schedule patient for treatment  It is to be weekly for 4 and then monthly    He is due this week

## 2024-09-17 NOTE — TELEPHONE ENCOUNTER
Patient called the RX Refill Line. Message is being forwarded to the office.     Patient called earlier about refill of Ambien. States that he handed a form to the doctor two months ago. Would like someone to call him and let him know that it was sent to [facility]. Not to happy that it is taking so long to get his medication filled.     Please contact patient at [phone] to let him know.

## 2024-09-17 NOTE — TELEPHONE ENCOUNTER
Patient called in to refill his Zolpidem after just being in the hospital for 11 days. He states he had come into the office prior and handed a prior authorization form to Dr. Castaneda himself about doing a PA for his Zolpidem so that it is covered. Please review and notify patient on if this was taken care of or not.    I submitted the refill request to the [facility] as high priority to be refilled awhile.

## 2024-09-18 ENCOUNTER — HOSPITAL ENCOUNTER (OUTPATIENT)
Dept: INFUSION CENTER | Facility: CLINIC | Age: 74
Discharge: HOME | End: 2024-09-18
Payer: MEDICARE

## 2024-09-18 ENCOUNTER — TELEPHONE (OUTPATIENT)
Age: 74
End: 2024-09-18

## 2024-09-18 VITALS
HEART RATE: 96 BPM | WEIGHT: 185.63 LBS | DIASTOLIC BLOOD PRESSURE: 80 MMHG | TEMPERATURE: 97.1 F | SYSTOLIC BLOOD PRESSURE: 139 MMHG | RESPIRATION RATE: 18 BRPM | BODY MASS INDEX: 28.22 KG/M2

## 2024-09-18 DIAGNOSIS — C91.10 CLL (CHRONIC LYMPHOCYTIC LEUKEMIA) (HCC): Primary | ICD-10-CM

## 2024-09-18 LAB
B2 MICROGLOB SERPL-MCNC: 3.7 MG/L (ref 0.6–2.4)
SCAN RESULT: NORMAL

## 2024-09-18 PROCEDURE — 96415 CHEMO IV INFUSION ADDL HR: CPT

## 2024-09-18 PROCEDURE — 96367 TX/PROPH/DG ADDL SEQ IV INF: CPT

## 2024-09-18 PROCEDURE — 96375 TX/PRO/DX INJ NEW DRUG ADDON: CPT

## 2024-09-18 PROCEDURE — 96413 CHEMO IV INFUSION 1 HR: CPT

## 2024-09-18 RX ORDER — ACETAMINOPHEN 325 MG/1
650 TABLET ORAL ONCE
Status: COMPLETED | OUTPATIENT
Start: 2024-09-18 | End: 2024-09-18

## 2024-09-18 RX ORDER — SODIUM CHLORIDE 9 MG/ML
20 INJECTION, SOLUTION INTRAVENOUS ONCE
Status: COMPLETED | OUTPATIENT
Start: 2024-09-18 | End: 2024-09-18

## 2024-09-18 RX ADMIN — FAMOTIDINE 20 MG: 10 INJECTION INTRAVENOUS at 10:30

## 2024-09-18 RX ADMIN — OBINUTUZUMAB 1000 MG: 1000 INJECTION, SOLUTION, CONCENTRATE INTRAVENOUS at 11:13

## 2024-09-18 RX ADMIN — ACETAMINOPHEN 650 MG: 325 TABLET ORAL at 09:38

## 2024-09-18 RX ADMIN — DIPHENHYDRAMINE HYDROCHLORIDE 25 MG: 50 INJECTION, SOLUTION INTRAMUSCULAR; INTRAVENOUS at 10:07

## 2024-09-18 RX ADMIN — DEXAMETHASONE SODIUM PHOSPHATE 20 MG: 10 INJECTION, SOLUTION INTRAMUSCULAR; INTRAVENOUS at 09:46

## 2024-09-18 RX ADMIN — SODIUM CHLORIDE 20 ML/HR: 9 INJECTION, SOLUTION INTRAVENOUS at 09:32

## 2024-09-18 NOTE — TELEPHONE ENCOUNTER
PA for Zolpidem Tartrate 5MG Tablet SUBMITTED     via    []CMM-KEY:   [x]Milady-Case ID # [id number]   []Faxed to plan   []Other website   []Phone call Case ID #     Office notes sent, clinical questions answered. Awaiting determination    Turnaround time for your insurance to make a decision on your Prior Authorization can take 7-21 business days.

## 2024-09-18 NOTE — TELEPHONE ENCOUNTER
PA for Zolpidem Tartrate 5MG Tablet DENIED    Reason:    Message sent to office clinical pool Yes    Denial letter scanned into Media Yes    Appeal started No (Provider will need to decide if appeal is warranted and send clinical documentation to Prior Authorization Team for initiation.)    **Please follow up with your patient regarding denial and next steps**

## 2024-09-18 NOTE — TELEPHONE ENCOUNTER
Informed Pt med is not covered and I will ask PCP if he wants him to try the alternative and call it in or if we can use a Good Rx for the Ambien. Awaiting for PCP response than, will call Pt back.

## 2024-09-18 NOTE — PROGRESS NOTES
Pt presents for gazyva. No new meds or concerns. Pt tolerated treatment without adverse reaction. Future appointments discussed, confirmed with patient for 9/26/2024 0900. AVS declined.

## 2024-09-18 NOTE — TELEPHONE ENCOUNTER
Patient is extremely frustrated that his medication was denied in June and no one even contacted him.   Patient states that he has been going through infusion treatments for his CLL and really needs this medication to help him sleep.   Patient is asking if someone could please look into this and try to get this approved for him asap.   Patient would like a call back .Cell [phone]    Reason for call:   [] Refill   [] Prior Auth  [] Other:     Office:   [] PCP/Provider -   [] Specialty/Provider -     Medication: zolpidem (AMBIEN) 5 mg tablet    Dose/Frequency: Take 1/2-1 full tablet at bedtime as needed for sleep. Do not take if drinking any alcohol or taking lorazepam,     Quantity: 90 tablet    Pharmacy: [facility] [place] [place] - [address] [place] [address]     Does the patient have enough for 3 days?   [] Yes   [x] No - Send as HP to POD\

## 2024-09-18 NOTE — TELEPHONE ENCOUNTER
Pt returned call looking for Pranay as he is waiting for an med update from her. Mike transferred the call to practice clinical was answered by Flaquita and told she left for the day and said no update from PCP if any she will call back patient relayed the same message to patient.Thanks

## 2024-09-19 ENCOUNTER — TELEPHONE (OUTPATIENT)
Dept: CARDIOLOGY CLINIC | Facility: CLINIC | Age: 74
End: 2024-09-19

## 2024-09-19 ENCOUNTER — REMOTE DEVICE CLINIC VISIT (OUTPATIENT)
Dept: CARDIOLOGY CLINIC | Facility: CLINIC | Age: 74
End: 2024-09-19
Payer: MEDICARE

## 2024-09-19 ENCOUNTER — OFFICE VISIT (OUTPATIENT)
Dept: CARDIOLOGY CLINIC | Facility: CLINIC | Age: 74
End: 2024-09-19
Payer: MEDICARE

## 2024-09-19 VITALS
SYSTOLIC BLOOD PRESSURE: 130 MMHG | WEIGHT: 185.6 LBS | HEART RATE: 73 BPM | DIASTOLIC BLOOD PRESSURE: 60 MMHG | BODY MASS INDEX: 28.22 KG/M2

## 2024-09-19 DIAGNOSIS — I45.81 LONG QT SYNDROME TYPE 2: Primary | ICD-10-CM

## 2024-09-19 DIAGNOSIS — F51.01 PRIMARY INSOMNIA: ICD-10-CM

## 2024-09-19 DIAGNOSIS — Z95.810 AICD (AUTOMATIC CARDIOVERTER/DEFIBRILLATOR) PRESENT: Primary | ICD-10-CM

## 2024-09-19 DIAGNOSIS — I48.0 PAROXYSMAL A-FIB (HCC): ICD-10-CM

## 2024-09-19 LAB
SCAN RESULT: NORMAL
SCAN RESULT: NORMAL

## 2024-09-19 PROCEDURE — 93000 ELECTROCARDIOGRAM COMPLETE: CPT | Performed by: INTERNAL MEDICINE

## 2024-09-19 PROCEDURE — 93296 REM INTERROG EVL PM/IDS: CPT | Performed by: INTERNAL MEDICINE

## 2024-09-19 PROCEDURE — 99214 OFFICE O/P EST MOD 30 MIN: CPT | Performed by: INTERNAL MEDICINE

## 2024-09-19 PROCEDURE — 93295 DEV INTERROG REMOTE 1/2/MLT: CPT | Performed by: INTERNAL MEDICINE

## 2024-09-19 RX ORDER — SODIUM FLUORIDE 6 MG/ML
PASTE, DENTIFRICE DENTAL
COMMUNITY
Start: 2024-09-17

## 2024-09-19 RX ORDER — ZOLPIDEM TARTRATE 5 MG/1
TABLET ORAL
Qty: 90 TABLET | Refills: 0 | Status: SHIPPED | OUTPATIENT
Start: 2024-09-19

## 2024-09-19 NOTE — PATIENT INSTRUCTIONS
DRUGS TO BE AVOIDED OR USED WITH CAUTION IN PATIENT WITH OR AT RISK FOR QT PRONGATION:     Drugs causing LQT and TDP   Antiarrhythmics: Disopyramide, procainamide, quinidine, amiodarone, bretylium, sotalol   Antimicrobial: Erythromycin, trimethoprim-sulfa   Antihistamine: Astemizole, terfenadine   Antifungal: Fluconazole, itraconazole, ketoconazole.   Antiprotozoal: Chloroquine, pentamidine, quinine, mefloquine, halofantrine.  Psychotropics: Chloral hydrate, haloperidol, lithium, phenothiazines, pimozide, tricyclic antidepressants   GI prokinetic: Cisapride.   Other: Indapamide, probucol, amantadine, tacrolimus, vasopressin   HypoK Hypo Mg induced by Diuretics, steroids, cathartics, liquid protein diet.     Drugs interfering with cytochrome P-450 enzyme AND indirectly causimg QT prolongation:.   Antifungals: Fluconazole, itraconazole, ketoconazole, metronidazole   Serotonin reuptake inhibitors: Fluoxetine, fluvoxamine, sertraline   HIV protease inhibitors: Indinavir, ritonavir, saquinavir   Dihydropyridine CCBs: Felodipine, nicardipine, nifedipine   Antimicrobials: Erythromycin   Others: Grapefruit juice. Hepatic dysfunction         FOLLOWING ANTIDEPRESSANTS ARE LESS LIKELY TO CAUSE CHANGES IN QT -INTERVAL:   Fluoxetine, paroxetine, sertraline, duloxetine (Cymbalta), mirtazapine, nortriptyline and venlafaxine.

## 2024-09-19 NOTE — TELEPHONE ENCOUNTER
Patient called the RX Refill Line. Message is being forwarded to the office.     Patient is requesting a call back from Pranay Directly to advise what is going on with th Zolpidem.  Patient is asking if the doctor is going to try and appeal the Prior Authorization or is the prescription going to be sent to [facility] for Patient to use Good RX.  Patient is currently out of medication.      Please contact patient at [phone]

## 2024-09-19 NOTE — PROGRESS NOTES
"Results for orders placed or performed in visit on 09/19/24   Cardiac EP device report    Narrative    MDT BI-V ICD (DDIR) ACTIVE SYSTEM IS MRI CONDITIONAL  CARELINK TRANSMISSION: BATTERY STATUS \"6 YRS.\" AP 98% CRT PACING (BIV) 100% (LV) 0%. ALL AVAILABLE LEAD PARAMETERS WITHIN NORMAL LIMITS. NO SIGNIFICANT HIGH RATE EPISODES. VENT SENSING MARKERS NOTED. OPTI-VOL FLUID THRESHOLD CROSSED. HF/RN MADE AWARE. NORMAL DEVICE FUNCTION. NC         "

## 2024-09-19 NOTE — TELEPHONE ENCOUNTER
Dr Bellorry I would like to appeal with insurance the Denial of Zolpidem.  Is there a contraindication to non-HRM (High Risk Medication) alternative drug doxepin 3mg or 6mg?

## 2024-09-19 NOTE — TELEPHONE ENCOUNTER
Patient called in frustrated due to not hearing anything yet. He barely got any sleep last night and was supposed to receive a call from Pranay in the office after she talked to Dr. Casatneda and he still hasn't heard anything. He states he is about to go into the office himself if it doesn't get taken care of today    I let him know I would send a message to Pranay to in the office give him a call back in regards to sending in a generic for Zolpidem and using a coupon to get it.    He would like it sent in to [facility] as of today and if he doesn't hear anything within an hour he is going to call in again and I told him he could ask for me that way he doesn't have to keep explaining the situation.

## 2024-09-19 NOTE — TELEPHONE ENCOUNTER
Patient called refill line again very frustrated about his medication. Patient was requesting to speak with Dianna however she was on her lunch break. Patient then proceded to explain everything that was going on and I did inform him that his medication was sent to the [facility] pharmacy for him. Patient then proceeded to ask to be transferred right to [facility] in the office. I was able to transfer him over to her.

## 2024-09-19 NOTE — TELEPHONE ENCOUNTER
Pt called in to inform he picked up the script from [facility] at a discounted rate as they do not accept Good Rx. Pt was informed we are working on the appeal to see why his medication got denied. Pt has f/u with PCP next Tuesday.

## 2024-09-19 NOTE — TELEPHONE ENCOUNTER
Please call pt to see if he is having any wt gain or edema, CP, sob, etc.  If he is then send this to the provider.    Thank you

## 2024-09-19 NOTE — PROGRESS NOTES
Cardiology             Murphy Willams  1950  [id number]                 Assessment/Plan     Long QT syndrome 2, now on nadolol, status post dual-chamber/biventricular AICD placed 3/8/2023  Paroxysmal atrial fibrillation/flutter  Paroxysmal SVT, status post adenosine 3/6/2023  Torsade de pointes and V-fib episode 3/6/2023  Nonischemic cardiomyopathy, ejection fraction 30%.  Normal coronary angiography 3/7/2023  CLL  History of heavy alcohol use  Dyslipidemia           Ventricular paced rhythm, stable QTc interval, continue nadolol  Patient given list of QT prolonging agents to avoid  Continue Pradaxa anticoagulation  Continue atorvastatin for dyslipidemia          Follow-up in 6 months          Interval History:      Murphy Willams is a 74 y.o. male with hypertension who has to be on minoxidil in the past.  He also has a history of prolonged QT interval, and has been seeing Dr. Garrison in the past.   He has also been following Dr. Lore robles pulmonary medicine for pulmonary hypertension and sleep apnea.     He was hospitalized with severe COVID-19 pneumonia 01/2021 at which time our group was consulted due to bradycardia and short runs of nonsustained ventricular tachycardia.  He was maintained on metoprolol and QT prolonging agents were avoided.       He presents to the hospital 3/6/2023 after an unresponsive episode at home.  EMS apparently noticed ventricular fibrillation and gave him 1 shock and started CPR.  He was not intubated and presented to the ER lethargic and confused.  He was in a narrow complex regular tachycardia, likely SVT.  He was given adenosine with resolution of his arrhythmia, back into sinus rhythm.  QT interval was initially within normal limits, but subsequently started becoming progressively more prolonged.  In the morning of 3/7/2023 he had torsades with resultant ventricular fibrillation.  He spontaneously converted back to sinus rhythm and was then started on a lidocaine infusion.   He underwent coronary angiography 3/7/2023 revealing no obstructive CAD.  A temporary pacing wire was placed for recurrent torsades.  He was transferred to [facility] and was thought to have LQT2, with possible torsades precipitated at home due to SSRI and antibiotic use.  He was initiated on nadolol.      He was noted to have paroxysmal atrial fibrillation/flutter and was placed on Xarelto anticoagulation.     Echocardiogram 3/9/2023 revealed left ventricle ejection fraction 30%.  This was thought to be a nonischemic cardiomyopathy possibly due to stress.    On 3/8/2023, he underwent placement of a [employer] dual-chamber AICD with an RA lead, RV lead, and LV/his lead.  He was evaluated by the heart failure service and initiated on losartan and spironolactone.  He was maintained on nadolol with plans to repeat echocardiogram in 3 months.    Repeat echocardiogram 8/21/2023 demonstrate left ejection fraction 45% with mild biatrial dilatation and mild aortic regurgitation.    He was hospitalized 9/2024 with significant cough with CT chest demonstrating right middle lobe pneumonia.  Cardiac CTA ruled out left atrial appendage thrombus which was suspected on prior CT.  He was significantly anemic thought to be due to progressive CLL.  A bone marrow biopsy was performed and he was started on immunotherapy with Engasser and Decadron.  On the day of discharge his hemoglobin was 6.6 and he received 1 unit of blood with instructions to follow-up with hematology after discharge.    He presents today for cardiology reevaluation.  Device interrogation today revealed 90% CRT pacing with no significant high rate episodes.  From a symptomatic standpoint he feels well without chest pain, shortness of breath, lightheadedness, lower extremity edema.      Vitals:  Vitals:    09/19/24 0911   BP: 130/60   BP Location: Right arm   Patient Position: Sitting   Cuff Size: Adult   Pulse: 73   Weight: 84.2 kg (185 lb 9.6 oz)          Past Medical History:   Diagnosis Date    Anxiety     BPH (benign prostatic hypertrophy)     Cancer (Formerly [facility])     CLL    CLL (chronic lymphocytic leukemia) (Formerly [facility])     2009    Colon polyp     Concussion     Resolved: 08/22/16    COVID-19 12/29/2023    Depression     Diverticulitis 01/13/2020 2014 CT done 11/19 12/19 CT done     E coli bacteremia 06/27/2021    2/2 blood cultures with Ecoli  Source appears to be the urine     Epididymitis 05/19/2021 5/2021    Gastrointestinal hemorrhage     Last assessed: 08/27/13    GERD (gastroesophageal reflux disease)     H/O vitamin D deficiency 07/07/2021    Hearing loss of aging     Hiatal hernia     History of right hip replacement 04/19/2021    History of transfusion     Hyperlipidemia     Hypertension     Hyponatremia 03/06/2023    Iron deficiency anemia     Microscopic hematuria     Last assessed: 06/28/13    OA (osteoarthritis)     right hip    Obesity (BMI 30.0-34.9) 04/07/2022    Pneumothorax     Primary osteoarthritis of right hip 09/29/2017    He is status post right hip arthroplasty    Prostatitis     Pulmonary hypertension (Former[facility])     QT prolongation     Seasonal allergies     Sepsis ([facility]) 03/06/2023    Unresponsive episode 03/07/2023    Urinary tract infection associated with catheterization of urinary tract  ([facility]) 02/05/2021    Pseudomonal UTI asymptomatic.  Per Urology do not treat at this time.    Urinary tract infection associated with catheterization of urinary tract  ([facility]) 02/05/2021    Pseudomonal UTI asymptomatic.  Per Urology do not treat at this time.  He did have urosepsis from E coli 7/21    UTI (urinary tract infection)     in past    Ventricular fibrillation ([facility]) 03/06/2023    Noted after unresponsive episode      Wears glasses      Social History     Socioeconomic History    Marital status: /Civil Union     Spouse name: Not on file    Number of children: Not on file    Years of education: Not on file    Highest education level: Not on  "file   Occupational History    Not on file   Tobacco Use    Smoking status: Former     Current packs/day: 0.00     Types: Cigarettes     Quit date: 1980     Years since quittin.0     Passive exposure: Past    Smokeless tobacco: Never   Vaping Use    Vaping status: Never Used   Substance and Sexual Activity    Alcohol use: Yes     Alcohol/week: 10.0 standard drinks of alcohol     Types: 10 Cans of beer per week     Comment: socially    Drug use: Not Currently     Types: Marijuana     Comment: \"medical marijuana\"    Sexual activity: Not Currently   Other Topics Concern    Not on file   Social History Narrative    Not on file     Social Determinants of Health     Financial Resource Strain: Low Risk  (10/23/2023)    Overall Financial Resource Strain (CARDIA)     Difficulty of Paying Living Expenses: Not hard at all   Food Insecurity: No Food Insecurity (2024)    Hunger Vital Sign     Worried About Running Out of Food in the Last Year: Never true     Ran Out of Food in the Last Year: Never true   Transportation Needs: No Transportation Needs (2024)    PRAPARE - Transportation     Lack of Transportation (Medical): No     Lack of Transportation (Non-Medical): No   Physical Activity: Not on file   Stress: Not on file   Social Connections: Not on file   Intimate Partner Violence: Not on file   Housing Stability: Low Risk  (2024)    Housing Stability Vital Sign     Unable to Pay for Housing in the Last Year: No     Number of Times Moved in the Last Year: 0     Homeless in the Last Year: No      Family History   Problem Relation Age of Onset    No Known Problems Mother     Heart attack Father     Diabetes Brother     Prostate cancer Brother      Past Surgical History:   Procedure Laterality Date    ADENOIDECTOMY      BLADDER SURGERY      CARDIAC CATHETERIZATION Left 2023    Procedure: Cardiac Left Heart Cath;  Surgeon: Rich Ramirez MD;  Location: [facility];  Service: Cardiology    CARDIAC " CATHETERIZATION N/A 03/07/2023    Procedure: Cardiac temporary pacemaker;  Surgeon: Rich Ramirez MD;  Location: [facility];  Service: Cardiology    CARDIAC ELECTROPHYSIOLOGY PROCEDURE N/A 03/08/2023    Procedure: Cardiac icd implant;  Surgeon: Filiberto Olsen MD;  Location: [facility];  Service: Cardiology    COLONOSCOPY      CYSTOSCOPY  10/09/2014    Diagnostic    CYSTOSCOPY  06/29/2020    FL CYSTOGRAM  08/15/2022    FRACTURE SURGERY      left lower arm    FRACTURE SURGERY      left femur    HERNIA REPAIR      IR BIOPSY BONE MARROW  9/9/2024    JOINT REPLACEMENT Right     hip    OTHER SURGICAL HISTORY  03/2011    Spinal anesthesia epidural    NH ARTHRP ACETBLR/PROX FEM PROSTC AGRFT/ALGRFT Right 09/29/2017    Procedure: ARTHROPLASTY HIP TOTAL ANTERIOR;  Surgeon: Roly Liu MD;  Location: [facility];  Service: Orthopedics    TONSILLECTOMY AND ADENOIDECTOMY      TRANSURETHRAL RESECTION OF PROSTATE      x 2    WRIST SURGERY         Current Outpatient Medications:     acetaminophen (TYLENOL) 325 mg tablet, Take 2 tablets (650 mg total) by mouth every 6 (six) hours as needed for mild pain, Disp: , Rfl:     allopurinol (ZYLOPRIM) 300 mg tablet, Take 1 tablet (300 mg total) by mouth daily, Disp: 30 tablet, Rfl: 0    ascorbic acid (VITAMIN C) 250 MG CHEW, Chew 250 mg daily, Disp: , Rfl:     atorvastatin (LIPITOR) 20 mg tablet, TAKE ONE TABLET BY MOUTH ONCE EVERY DAY, Disp: 90 tablet, Rfl: 3    benzonatate (TESSALON) 200 MG capsule, Take 1 capsule (200 mg total) by mouth 3 (three) times a day as needed for cough, Disp: 30 capsule, Rfl: 1    Blood Pressure Monitoring (Omron 5 Series BP Monitor) PEE, , Disp: , Rfl:     dabigatran etexilate (PRADAXA) 150 mg capsu, Take 1 capsule (150 mg total) by mouth 2 (two) times a day, Disp: 180 capsule, Rfl: 2    furosemide (LASIX) 20 mg tablet, Take 1 tablet (20 mg total) by mouth daily, Disp: 30 tablet, Rfl: 0    Lactobacillus (PROBIOTIC ACIDOPHILUS PO), Take by mouth,  Disp: , Rfl:     LORazepam (Ativan) 0.5 mg tablet, Take 1 tablet (0.5 mg total) by mouth daily as needed for anxiety, Disp: 60 tablet, Rfl: 1    montelukast (SINGULAIR) 10 mg tablet, TAKE ONE TABLET BY MOUTH DAILY, Disp: 90 tablet, Rfl: 3    nadolol (CORGARD) 80 MG tablet, Take 1 tablet (80 mg total) by mouth daily in the early morning, Disp: 90 tablet, Rfl: 3    Omega-3 Fatty Acids (FISH OIL) 1,000 mg, Take by mouth daily, Disp: , Rfl:     omeprazole (PriLOSEC) 40 MG capsule, TAKE ONE CAPSULE BY MOUTH ONCE EVERY DAY, Disp: 90 capsule, Rfl: 1    predniSONE 10 mg tablet, 4 for 4 days,2 for 4 days,1 for 4 days Do not start before September 19, 2024., Disp: 28 tablet, Rfl: 0    Sodium Fluoride 5000 PPM 1.1 % PSTE, USE A PEA-SIZED AMOUNT AND BRUSH FOR 2 MINUTES ONCE DAILY. NO FOOD OR RINSING FOR 30 MINUTES., Disp: , Rfl:     vitamin B-12 (CYANOCOBALAMIN) 500 MCG TABS, Take 1 tablet (500 mcg total) by mouth daily, Disp: 30 tablet, Rfl: 0    zolpidem (AMBIEN) 5 mg tablet, Take 1/2-1 full tablet at bedtime as needed for sleep.  Do not take if drinking any alcohol or taking lorazepam, Disp: 90 tablet, Rfl: 0  No current facility-administered medications for this visit.    Facility-Administered Medications Ordered in Other Visits:     alteplase (CATHFLO) injection 2 mg, 2 mg, Intracatheter, Q1MIN PRN, Aspen Garcia MD    Famotidine (PF) (PEPCID) 20 mg in sodium chloride 0.9 % 50 mL IVPB, 20 mg, Intravenous, Q12H [facility], Stopped at 09/18/24 1045        Review of Systems:  Review of Systems   Respiratory: Negative.     Cardiovascular: Negative.    All other systems reviewed and are negative.        Physical Exam:  Physical Exam  Constitutional:       General: He is not in acute distress.     Appearance: He is well-developed. He is not diaphoretic.   HENT:      Head: Normocephalic and atraumatic.   Eyes:      General: No scleral icterus.        Right eye: No discharge.      Pupils: Pupils are equal, round,  and reactive to light.   Neck:      Thyroid: No thyromegaly.   Cardiovascular:      Rate and Rhythm: Normal rate and regular rhythm.      Heart sounds: Normal heart sounds. No murmur heard.     No friction rub. No gallop.   Pulmonary:      Effort: Pulmonary effort is normal.      Breath sounds: Normal breath sounds.   Abdominal:      General: There is no distension.      Tenderness: There is no abdominal tenderness. There is no guarding or rebound.   Musculoskeletal:         General: Normal range of motion.      Cervical back: Normal range of motion and neck supple.      Right lower leg: No edema.      Left lower leg: No edema.   Skin:     General: Skin is warm and dry.      Coloration: Skin is not pale.      Findings: No erythema or rash.   Neurological:      Mental Status: He is alert and oriented to person, place, and time.      Coordination: Coordination normal.   Psychiatric:         Behavior: Behavior normal.         Thought Content: Thought content normal.         Judgment: Judgment normal.       This note was completed in part utilizing [employer] Software.  Grammatical errors, random word insertions, spelling mistakes, and incomplete sentences can be an occasional consequence of this system secondary to software limitations, ambient noise, and hardware issues.  If you have any questions or concerns about the content, text, or information contained within the body of this dictation, please contact the provider for clarification.

## 2024-09-20 ENCOUNTER — TELEPHONE (OUTPATIENT)
Dept: HEMATOLOGY ONCOLOGY | Facility: CLINIC | Age: 74
End: 2024-09-20

## 2024-09-20 NOTE — PROGRESS NOTES
HPI: Patient is here with his wife.  This is continuation of care for CLL.  Patient was recently hospitalized earlier this month with pneumonia and was found to have profound anemia and there was no evidence of bleeding.  Some evidence of hemolysis, increased reticulocyte and increased bilirubin and that was primarily indirect bilirubin.  Patient received blood transfusions in the hospital.  He was not a good candidate for BTK inhibitors because of paroxysmal atrial fibrillation and Xarelto.  Xarelto was recently changed to Pradaxa.  Patient received day 1 and day 2 treatment with Gazyva and that will be continued and to that venetoclax will be added or Gazyva will be replaced by venetoclax.  He was running more than 200,000 WBC, primarily lymphocytes and WBC count is coming down.  Patient has tiredness and weakness and exertional dyspnea.  He has prolonged QT interval and has dual-chamber/biventricular AICD that was placed in March 2023.  Question came up of Benadryl in the premedication and his cardiologist had sent me a message and okayed the use of Benadryl.  Patient would like to have Benadryl.  He states when he received Benadryl he did not have reaction to Gazyva.   He has generalized skin rash and he thinks that is from Augmentin.Rash started after he was started on Augmentin and was there before he was started on allopurinol.  CLL was diagnosed in 2010 and patient had not required therapy for that until now.      Patient had iron deficiency and had GI evaluation and had intravenous Venofer.  He follows with his gastroenterologist.   In May 2021 he was found to have profound autoimmune hemolytic anemia with significant drop in hemoglobin and he was treated with prednisone.    He has enlarged prostate and nocturia and he follows with his urologist.    He has history of QT prolongation and has a pacemaker/defibrillator and he follows with his cardiologist and electrophysiologist.    He also follows with his  lung specialist for exertional dyspnea and COPD.  History of cirrhosis of liver.  He gives history of B12 deficiency and has been taking B12 orally.  He had bone marrow test in the hospital and the report is pending.          Current Outpatient Medications:     acetaminophen (TYLENOL) 325 mg tablet, Take 2 tablets (650 mg total) by mouth every 6 (six) hours as needed for mild pain, Disp: , Rfl:     allopurinol (ZYLOPRIM) 300 mg tablet, Take 1 tablet (300 mg total) by mouth daily, Disp: 30 tablet, Rfl: 0    ascorbic acid (VITAMIN C) 250 MG CHEW, Chew 250 mg daily, Disp: , Rfl:     atorvastatin (LIPITOR) 20 mg tablet, TAKE ONE TABLET BY MOUTH ONCE EVERY DAY, Disp: 90 tablet, Rfl: 3    benzonatate (TESSALON) 200 MG capsule, Take 1 capsule (200 mg total) by mouth 3 (three) times a day as needed for cough, Disp: 30 capsule, Rfl: 1    Blood Pressure Monitoring (Omron 5 Series BP Monitor) PEE, , Disp: , Rfl:     dabigatran etexilate (PRADAXA) 150 mg capsu, Take 1 capsule (150 mg total) by mouth 2 (two) times a day, Disp: 180 capsule, Rfl: 2    furosemide (LASIX) 20 mg tablet, Take 1 tablet (20 mg total) by mouth daily, Disp: 30 tablet, Rfl: 0    Lactobacillus (PROBIOTIC ACIDOPHILUS PO), Take by mouth, Disp: , Rfl:     LORazepam (Ativan) 0.5 mg tablet, Take 1 tablet (0.5 mg total) by mouth daily as needed for anxiety, Disp: 60 tablet, Rfl: 1    montelukast (SINGULAIR) 10 mg tablet, TAKE ONE TABLET BY MOUTH DAILY, Disp: 90 tablet, Rfl: 3    nadolol (CORGARD) 80 MG tablet, Take 1 tablet (80 mg total) by mouth daily in the early morning, Disp: 90 tablet, Rfl: 3    Omega-3 Fatty Acids (FISH OIL) 1,000 mg, Take by mouth daily, Disp: , Rfl:     omeprazole (PriLOSEC) 40 MG capsule, TAKE ONE CAPSULE BY MOUTH ONCE EVERY DAY, Disp: 90 capsule, Rfl: 1    predniSONE 10 mg tablet, 4 for 4 days,2 for 4 days,1 for 4 days Do not start before September 19, 2024., Disp: 28 tablet, Rfl: 0    Sodium Fluoride 5000 PPM 1.1 % PSTE, USE A  PEA-SIZED AMOUNT AND BRUSH FOR 2 MINUTES ONCE DAILY. NO FOOD OR RINSING FOR 30 MINUTES., Disp: , Rfl:     vitamin B-12 (CYANOCOBALAMIN) 500 MCG TABS, Take 1 tablet (500 mcg total) by mouth daily, Disp: 30 tablet, Rfl: 0    zolpidem (AMBIEN) 5 mg tablet, Take 1/2-1 full tablet at bedtime as needed for sleep.  Do not take if drinking any alcohol or taking lorazepam, Disp: 90 tablet, Rfl: 0  No current facility-administered medications for this visit.    Facility-Administered Medications Ordered in Other Visits:     alteplase (CATHFLO) injection 2 mg, 2 mg, Intracatheter, Q1MIN PRN, Aspen Garcia MD    Famotidine (PF) (PEPCID) 20 mg in sodium chloride 0.9 % 50 mL IVPB, 20 mg, Intravenous, Q12H CHET, David MD Dayan, Stopped at 09/18/24 1045    Allergies   Allergen Reactions    Ciprofloxacin Other (See Comments)     LONG QT syndrome    Codeine Other (See Comments)     agitated    Sulfamethoxazole-Trimethoprim GI Intolerance, Abdominal Pain and Other (See Comments)     upset stomach    Augmentin [Amoxicillin-Pot Clavulanate] Rash     Livedo reticularis    Macrobid [Nitrofurantoin] Dizziness, Headache and Fatigue     Per Patient       Oncology History   CLL (chronic lymphocytic leukemia) ([facility])   9/29/2017 Initial Diagnosis    CLL (chronic lymphocytic leukemia) ([facility])     9/10/2024 -  Chemotherapy    alteplase (CATHFLO), 2 mg, Intracatheter, Every 1 Minute as needed, 1 of 6 cycles  obinutuzumab (GAZYVA) day 1 IVPB, 100 mg, Intravenous, Once, 1 of 1 cycle  Administration: 100 mg (9/11/2024)  obinutuzumab (GAZYVA) day 2 titrated infusion, 900 mg, Intravenous, Once, 1 of 1 cycle  Administration: 900 mg (9/11/2024)  obinutuzumab (GAZYVA) subsequent titrated infusion, 1,000 mg, Intravenous, Once, 1 of 6 cycles  Administration: 1,000 mg (9/18/2024)         ROS:  09/20/24 Reviewed 12 systems: See symptoms in HPI  Presently no other neurological, cardiac, pulmonary, GI and  symptoms other than mentioned in HPI.  Other  "symptoms are in HPI. No  fever, chills, bleeding, bone pains,  night sweats,   numbness,  claudication and gait problem. No frequent infections except recent pneumonia.  Not unusually sensitive to heat or cold. No swelling of the ankles. No swollen glands.  Patient is anxious.       /78 (BP Location: Left arm)   Pulse 94   Temp 98 °F (36.7 °C) (Tympanic)   Resp 18   Ht 5' 8\" (1.727 m)   Wt 84.8 kg (187 lb)   SpO2 97%   BMI 28.43 kg/m²     Physical Exam:  Patient is alert and oriented.  Patient is not in distress.  Vital signs are above.  Not sure of slight icterus?  There is no oral thrush.  There is no palpable neck mass.  Prolonged expiration.  Irregular heart rate.  AICD.  Abdomen is soft and nontender.  There is no palpable abdominal mass.  There is no ascites.  There is no edema of the ankles.  There is no calf tenderness.  He has generalized weakness.  No focal neurological deficit.  Generalized skin rash, more so on the legs.  There is no palpable lymphadenopathy in the neck and axillary areas, no clubbing.    Patient is anxious.  Performance status 2.    IMAGING:  MPRESSION:     Mild hepatomegaly.. Possible 2 mm hemangioma.  Moderate splenomegaly.  Gallstone in the gallbladder.              Workstation performed: [id number]        Imaging    US abdomen complete (Order: [id number]) - 9/3/2024    IMPRESSION:     No pulmonary embolus.     Right middle lobe pneumonia.     Lack of opacification of the left atrial appendage. It cannot be determined if this is due to nonopacified blood in the nondependent portion of the left atrium or left atrial appendage thrombus.     Mild splenomegaly and minimally enlarged right axillary node, likely due to the patient's history of CLL.     The study was marked in EPIC for immediate notification.           Workstation performed: [id number]        Imaging    CTA ED chest PE study (Order: [id number]) - 9/2/2024  LABS:    Results for orders placed or performed in visit on " 09/16/24   Beta 2 microglobulin, serum   Result Value Ref Range    Beta-2 Microglobulin 3.7 (H) 0.6 - 2.4 mg/L   CBC and differential   Result Value Ref Range    WBC 20.05 (H) 4.31 - 10.16 Thousand/uL    RBC 2.49 (L) 3.88 - 5.62 Million/uL    Hemoglobin 9.5 (L) 12.0 - 17.0 g/dL    Hematocrit 26.0 (L) 36.5 - 49.3 %     (H) 82 - 98 fL    MCH 38.2 (H) 26.8 - 34.3 pg    MCHC 36.5 31.4 - 37.4 g/dL    RDW 25.4 (H) 11.6 - 15.1 %    MPV 10.5 8.9 - 12.7 fL    Platelets 260 149 - 390 Thousands/uL   Reticulocytes   Result Value Ref Range    Retic Ct Abs 172,300 (H) 14,356 - 105,094    Retic Ct Pct 6.92 (H) 0.37 - 1.87 %   Direct antiglobulin test   Result Value Ref Range    DIRECT BEBETO Negative    Manual Differential(PHLEBS Do Not Order)   Result Value Ref Range    Segmented % 41 (L) 43 - 75 %    Bands % 2 0 - 8 %    Lymphocytes % 54 (H) 14 - 44 %    Monocytes % 2 (L) 4 - 12 %    Eosinophils % 0 0 - 6 %    Basophils % 0 0 - 1 %    Atypical Lymphocytes % 1 (H) <=0 %    Absolute Neutrophils 8.62 (H) 1.85 - 7.62 Thousand/uL    Absolute Lymphocytes 11.03 (H) 0.60 - 4.47 Thousand/uL    Absolute Monocytes 0.40 0.00 - 1.22 Thousand/uL    Absolute Eosinophils 0.00 0.00 - 0.40 Thousand/uL    Absolute Basophils 0.00 0.00 - 0.10 Thousand/uL    Total Counted      Smudge Cells Present     RBC Morphology Present     Platelet Estimate Adequate Adequate    Anisocytosis Present     Microcytes Present     Polychromasia Present      *Note: Due to a large number of results and/or encounters for the requested time period, some results have not been displayed. A complete set of results can be found in Results Review.         Component 9/16/24  8:36 AM   DIRECT BEBETO Negative              Specimen Collected: 09/16/24  8:36 AM Last Resulted: 09/16/24  2:42 PM               Component  Ref Range & Units 9/16/24  8:36 AM   Beta-2 Microglobulin  0.6 - 2.4 mg/L 3.7 High                 Component  Ref Range & Units 9/16/24  8:36 AM 9/12/24  5:15  AM 9/11/24  5:08 AM 5/31/24  8:13 AM 2/20/24  8:04 AM   Uric Acid  3.5 - 8.5 mg/dL 4.2 5.1 CM 7.4 CM 5.9 CM 6.7 CM   Comment: Specimen collection should occur prior to Metamizole administration due to the potential for falsely depressed results.                  Component  Ref Range & Units 9/16/24  8:36 AM 9/12/24  5:15 AM 9/11/24  5:08 AM 9/4/24  4:38 AM 9/3/24  4:25 AM 5/31/24  8:13 AM 2/20/24  8:04 AM   LD  140 - 271 U/L 368 High  234 280 High  234 254 173 162                         Component  Ref Range & Units 9/16/24  8:36 AM 5/31/24  8:13 AM 2/20/24  8:04 AM   IGA  66 - 433 mg/dL 387 422 502 High    IGG  635 - 1,741 mg/dL 965 1,025 1,138   IGM  45 - 281 mg/dL 41 Low  46 77                 Comprehensive metabolic panel  Status: Final result      Contains abnormal data Comprehensive metabolic panel  Order: [id number]   Status: Final result       Visible to patient: Yes (seen)       Next appt: 09/23/2024 at 02:40 PM in Dermatology (Coleman Hawthorne MD)       Dx: Ventricular fibrillation (HCC); Hyper...    1 Result Note       1 Patient Communication            Component  Ref Range & Units 9/16/24  8:36 AM 9/12/24  5:15 AM 9/11/24  5:08 AM 9/10/24  4:47 AM 9/9/24  4:21 AM 9/8/24  4:55 AM 9/7/24  5:39 AM   Sodium  135 - 147 mmol/L 131 Low  133 Low  132 Low  132 Low  133 Low  133 Low  130 Low    Potassium  3.5 - 5.3 mmol/L 4.3 4.2 4.2 4.1 4.7 4.6 5.2   Chloride  96 - 108 mmol/L 96 99 99 98 97 99 98   CO2  21 - 32 mmol/L 27 29 27 29 31 29 28   ANION GAP  4 - 13 mmol/L 8 5 6 5 5 5 4   BUN  5 - 25 mg/dL 14 16 20 21 20 22 24   Creatinine  0.60 - 1.30 mg/dL 0.65 0.65 CM 0.64 CM 0.63 CM 0.72 CM 0.77 CM 0.86 CM   Comment: Standardized to IDMS reference method   Glucose, Fasting  65 - 99 mg/dL 111 High          Calcium  8.4 - 10.2 mg/dL 8.9 7.9 Low  7.9 Low  7.8 Low  8.1 Low  7.9 Low  8.7   AST  13 - 39 U/L 34         ALT  7 - 52 U/L 72 High          Comment: Specimen collection should occur prior to Sulfasalazine  administration due to the potential for falsely depressed results.   Alkaline Phosphatase  34 - 104 U/L 99         Total Protein  6.4 - 8.4 g/dL 7.2         Albumin  3.5 - 5.0 g/dL 4.0         Total Bilirubin  0.20 - 1.00 mg/dL 3.01 High          Comment: Use of this assay is not recommended for patients undergoing treatment with eltrombopag due to the potential for falsely elevated results.  N-acetyl-p-benzoquinone imine (metabolite of Acetaminophen) will generate erroneously low results in samples for patients that have taken an overdose of Acetaminophen.   eGFR  ml/min/1.73sq m 97 97 98 99 93 91 87                               Labs, Imaging, & Other studies:   All pertinent labs and imaging studies were personally reviewed        Reviewed hospital records and discussed with patient.  Patient is here with his wife.  This is continuation of care for CLL.  Patient was recently hospitalized earlier this month with pneumonia and was found to have profound anemia and there was no evidence of bleeding.  Some evidence of hemolysis, increased reticulocyte and increased bilirubin and that was primarily indirect bilirubin.  Patient received blood transfusions in the hospital.  He was not a good candidate for BTK inhibitors because of paroxysmal atrial fibrillation and Xarelto.  Xarelto was recently changed to Pradaxa.  Patient received day 1 and day 2 treatment with Gazyva and that will be continued and to that venetoclax will be added or Gazyva will be replaced by venetoclax.  He was running more than 200,000 WBC, primarily lymphocytes and WBC count is coming down.  Patient has tiredness and weakness and exertional dyspnea.  He has prolonged QT interval and has dual-chamber/biventricular AICD that was placed in March 2023.  Question came up of Benadryl in the premedication and his cardiologist had sent me a message and okayed the use of Benadryl.  Patient would like to have Benadryl.  He states when he received Benadryl  he did not have reaction to Gazyva.   He has generalized skin rash and he thinks that is from Augmentin.  Rash started after he was started on Augmentin and was there before he was started on allopurinol.  CLL was diagnosed in 2010 and patient had not required therapy for that until now.      Patient had iron deficiency and had GI evaluation and had intravenous Venofer.  He follows with his gastroenterologist.   In May 2021 he was found to have profound autoimmune hemolytic anemia with significant drop in hemoglobin and he was treated with prednisone.    He has enlarged prostate and nocturia and he follows with his urologist.    He has history of QT prolongation and has a pacemaker/defibrillator and he follows with his cardiologist and electrophysiologist.    He also follows with his lung specialist for exertional dyspnea and COPD.  History of cirrhosis of liver.  He gives history of B12 deficiency and has been taking B12 orally.  He had bone marrow test in the hospital and the report is pending.  Physical examination and test results are as recorded and discussed in detail.  Reviewed hospital records.  Continuing on Gazyva and to that venetoclax will be added or Gazyva will be replaced by venetoclax.  Patient had signed for these 2 drugs in the hospital and initialed again today.  When the supply came to the house for venetoclax he will not start taking it because he will be hospitalized for 2 or 3 days to start venetoclax because of risk of tumor lysis syndrome.  I explained this to patient and his wife very clearly.  Goal is to manage CLL with profound anemia and prolongation of survival.  Patient is capable of self-care.  Diet and activities per patient's primary physician and other consultants especially cardiologists.  Patient to avoid falls and trauma.  All discussed in detail.  Questions answered.  Discussed health screening tests later on.  Assessment and plan: See diagnoses, orders and instructions  below.  1. CLL (chronic lymphocytic leukemia) (HCC)    - CBC and differential; Standing  - Comprehensive metabolic panel; Standing  - Uric acid; Standing  - LD,Blood; Standing    2. Hepatosplenomegaly      3. Paroxysmal A-fib (HCC)      4. Chronic combined systolic and diastolic congestive heart failure (HCC)      5. BPH with obstruction/lower urinary tract symptoms      6. Pulmonary hypertension (HCC)      7. Arthritis      8. Pacemaker      9. Anemia, unspecified type    - CBC and differential; Standing  - Ferritin; Future  - Reticulocytes; Future  10.  Autoimmune hemolytic anemia    Weekly blood work 1 or 2 days prior to Gazyva.  He will have Gazyva once a week for 3 weeks and once a month after that.  Patient has signed informed consent for Gazyva plus venetoclax that he initialed  today.  Follow-up in 2 weeks.  Patient will have EKG at his cardiologist's office on Thursday     .  Patient will continue to follow with  primary physician and other consultants.  Patient  voiced understanding and agrees      Disclaimer: This document was prepared using a dictation device.  If a word or phrase is confusing, or does not make sense, this is likely due to recognition error which was not discovered during the providers review. If you believe an error has occurred, please Contact me through [employer] [facility] Line service for servando?cation.    Counseling / Coordination of Care   .  Provided counseling and support

## 2024-09-21 LAB — SCAN RESULT: NORMAL

## 2024-09-23 ENCOUNTER — TELEPHONE (OUTPATIENT)
Age: 74
End: 2024-09-23

## 2024-09-23 ENCOUNTER — TELEPHONE (OUTPATIENT)
Dept: HEMATOLOGY ONCOLOGY | Facility: CLINIC | Age: 74
End: 2024-09-23

## 2024-09-23 ENCOUNTER — OFFICE VISIT (OUTPATIENT)
Dept: DERMATOLOGY | Facility: CLINIC | Age: 74
End: 2024-09-23
Payer: MEDICARE

## 2024-09-23 VITALS — TEMPERATURE: 97.4 F | WEIGHT: 187.7 LBS | BODY MASS INDEX: 28.45 KG/M2 | HEIGHT: 68 IN

## 2024-09-23 DIAGNOSIS — L81.4 LENTIGINES: ICD-10-CM

## 2024-09-23 DIAGNOSIS — D84.9 IMMUNOSUPPRESSED STATUS (HCC): Primary | ICD-10-CM

## 2024-09-23 DIAGNOSIS — D18.01 CHERRY ANGIOMA: ICD-10-CM

## 2024-09-23 DIAGNOSIS — Z85.89 HISTORY OF SQUAMOUS CELL CARCINOMA: ICD-10-CM

## 2024-09-23 DIAGNOSIS — R21 RASH: ICD-10-CM

## 2024-09-23 DIAGNOSIS — D22.9 MULTIPLE MELANOCYTIC NEVI: ICD-10-CM

## 2024-09-23 DIAGNOSIS — L82.1 SEBORRHEIC KERATOSES: ICD-10-CM

## 2024-09-23 DIAGNOSIS — R23.1 LIVEDO RETICULARIS: ICD-10-CM

## 2024-09-23 DIAGNOSIS — L29.9 PRURITUS: ICD-10-CM

## 2024-09-23 LAB
ANISOCYTOSIS BLD QL SMEAR: PRESENT
BASOPHILS # BLD MANUAL: 0 THOUSAND/UL (ref 0–0.1)
BASOPHILS NFR MAR MANUAL: 0 % (ref 0–1)
EOSINOPHIL # BLD MANUAL: 0 THOUSAND/UL (ref 0–0.4)
EOSINOPHIL NFR BLD MANUAL: 0 % (ref 0–6)
LYMPHOCYTES # BLD AUTO: 104.16 THOUSAND/UL (ref 0.6–4.47)
LYMPHOCYTES # BLD AUTO: 8 % (ref 14–44)
MACROCYTES BLD QL AUTO: PRESENT
MISCELLANEOUS LAB TEST RESULT: NORMAL
MONOCYTES # BLD AUTO: 0 THOUSAND/UL (ref 0–1.22)
MONOCYTES NFR BLD: 0 % (ref 4–12)
MYELOCYTE ABSOLUTE CT: 1.13 THOUSAND/UL (ref 0–0.1)
NEUTROPHILS # BLD MANUAL: 7.93 THOUSAND/UL (ref 1.85–7.62)
NEUTS BAND NFR BLD MANUAL: 2 % (ref 0–8)
NEUTS SEG NFR BLD AUTO: 11 % (ref 43–75)
PATHOLOGY REVIEW: YES
PLATELET BLD QL SMEAR: ADEQUATE
POLYCHROMASIA BLD QL SMEAR: PRESENT
SMUDGE CELLS BLD QL SMEAR: PRESENT
TOTAL CELLS COUNTED SPEC: 100
VARIANT LYMPHS # BLD AUTO: 81 %

## 2024-09-23 PROCEDURE — 88346 IMFLUOR 1ST 1ANTB STAIN PX: CPT | Performed by: STUDENT IN AN ORGANIZED HEALTH CARE EDUCATION/TRAINING PROGRAM

## 2024-09-23 PROCEDURE — 88313 SPECIAL STAINS GROUP 2: CPT | Performed by: STUDENT IN AN ORGANIZED HEALTH CARE EDUCATION/TRAINING PROGRAM

## 2024-09-23 PROCEDURE — 11104 PUNCH BX SKIN SINGLE LESION: CPT | Performed by: STUDENT IN AN ORGANIZED HEALTH CARE EDUCATION/TRAINING PROGRAM

## 2024-09-23 PROCEDURE — 88350 IMFLUOR EA ADDL 1ANTB STN PX: CPT | Performed by: STUDENT IN AN ORGANIZED HEALTH CARE EDUCATION/TRAINING PROGRAM

## 2024-09-23 PROCEDURE — 99214 OFFICE O/P EST MOD 30 MIN: CPT | Performed by: STUDENT IN AN ORGANIZED HEALTH CARE EDUCATION/TRAINING PROGRAM

## 2024-09-23 PROCEDURE — 88305 TISSUE EXAM BY PATHOLOGIST: CPT | Performed by: STUDENT IN AN ORGANIZED HEALTH CARE EDUCATION/TRAINING PROGRAM

## 2024-09-23 PROCEDURE — 11105 PUNCH BX SKIN EA SEP/ADDL: CPT | Performed by: STUDENT IN AN ORGANIZED HEALTH CARE EDUCATION/TRAINING PROGRAM

## 2024-09-23 NOTE — TELEPHONE ENCOUNTER
Pt called to report concerns about venetoclax . Pt reported that it was never discussed with him during the office visit with Dr. Hanley that he was supposed to be hospitalized when starting this medication for 2-3 days and also was upset to hear that the co pay is $60. Pt is concerned about starting the medication and has follow up questions. Pt also asked how would his wife come visit him in the hospital because she is unable to drive.  Pt would like to receive a call at [phone] to discuss. Pt will start driving to a appt around 2 pm and is unsure what time he will be back home but reports to call his cell today if possible.

## 2024-09-23 NOTE — TELEPHONE ENCOUNTER
Spoke with pt regarding request from office to start Venetoclax. Copay $60 per month is acceptable per pt, however he was unaware of the need to start this med. Pt agreed to discuss further at 10/1 appt with Dr. Hanley, and [facility] will hold off on contacting pt until after that.

## 2024-09-23 NOTE — PROGRESS NOTES
"[facility] Note     Patient Name: Murphy Willams  Encounter Date: 09/23/2024     Have you been cared for by a [facility] in the last 3 years and, if so, which description applies to you?    Yes.  I have been here within the last 3 years, and my medical history has NOT changed since that time.  I am MALE/not capable of bearing children.    REVIEW OF SYSTEMS:  Have you recently had or currently have any of the following? No changes in my recent health.   PAST MEDICAL HISTORY:  Have you personally ever had or currently have any of the following?  If \"YES,\" then please provide more detail. No changes in my medical history.   HISTORY OF IMMUNOSUPPRESSION: Do you have a history of any of the following:  Systemic Immunosuppression such as Diabetes, Biologic or Immunotherapy, Chemotherapy, Organ Transplantation, Bone Marrow Transplantation or Prednisone?  YES, Infusion     Answering \"YES\" requires the addition of the dotphrase \"IMMUNOSUPPRESSED\" as the first diagnosis of the patient's visit.   FAMILY HISTORY:  Any \"first degree relatives\" (parent, brother, sister, or child) with the following?    No changes in my family's known health.   PATIENT EXPERIENCE:    Do you want the Dermatologist to perform a COMPLETE skin exam today including a clinical examination under the \"bra and underwear\" areas?  Yes  If necessary, do we have your permission to call and leave a detailed message on your Preferred Phone number that includes your specific medical information?  Yes      Allergies   Allergen Reactions    Ciprofloxacin Other (See Comments)     LONG QT syndrome    Codeine Other (See Comments)     agitated    Sulfamethoxazole-Trimethoprim GI Intolerance, Abdominal Pain and Other (See Comments)     upset stomach    Augmentin [Amoxicillin-Pot Clavulanate] Rash     Livedo reticularis    Macrobid [Nitrofurantoin] Dizziness, Headache and Fatigue     Per Patient      Current Outpatient Medications:     " acetaminophen (TYLENOL) 325 mg tablet, Take 2 tablets (650 mg total) by mouth every 6 (six) hours as needed for mild pain, Disp: , Rfl:     allopurinol (ZYLOPRIM) 300 mg tablet, Take 1 tablet (300 mg total) by mouth daily, Disp: 30 tablet, Rfl: 0    ascorbic acid (VITAMIN C) 250 MG CHEW, Chew 250 mg daily, Disp: , Rfl:     atorvastatin (LIPITOR) 20 mg tablet, TAKE ONE TABLET BY MOUTH ONCE EVERY DAY, Disp: 90 tablet, Rfl: 3    benzonatate (TESSALON) 200 MG capsule, Take 1 capsule (200 mg total) by mouth 3 (three) times a day as needed for cough, Disp: 30 capsule, Rfl: 1    Blood Pressure Monitoring (Omron 5 Series BP Monitor) PEE, , Disp: , Rfl:     dabigatran etexilate (PRADAXA) 150 mg capsu, Take 1 capsule (150 mg total) by mouth 2 (two) times a day, Disp: 180 capsule, Rfl: 2    furosemide (LASIX) 20 mg tablet, Take 1 tablet (20 mg total) by mouth daily, Disp: 30 tablet, Rfl: 0    Lactobacillus (PROBIOTIC ACIDOPHILUS PO), Take by mouth, Disp: , Rfl:     LORazepam (Ativan) 0.5 mg tablet, Take 1 tablet (0.5 mg total) by mouth daily as needed for anxiety, Disp: 60 tablet, Rfl: 1    montelukast (SINGULAIR) 10 mg tablet, TAKE ONE TABLET BY MOUTH DAILY, Disp: 90 tablet, Rfl: 3    nadolol (CORGARD) 80 MG tablet, Take 1 tablet (80 mg total) by mouth daily in the early morning, Disp: 90 tablet, Rfl: 3    Omega-3 Fatty Acids (FISH OIL) 1,000 mg, Take by mouth daily, Disp: , Rfl:     omeprazole (PriLOSEC) 40 MG capsule, TAKE ONE CAPSULE BY MOUTH ONCE EVERY DAY, Disp: 90 capsule, Rfl: 1    predniSONE 10 mg tablet, 4 for 4 days,2 for 4 days,1 for 4 days Do not start before September 19, 2024., Disp: 28 tablet, Rfl: 0    Sodium Fluoride 5000 PPM 1.1 % PSTE, USE A PEA-SIZED AMOUNT AND BRUSH FOR 2 MINUTES ONCE DAILY. NO FOOD OR RINSING FOR 30 MINUTES., Disp: , Rfl:     Venetoclax 10 & 50 & 100 MG TBPK, Will start in hospital  Take 20 mg by mouth daily for 7 days, then 50 mg daily for 7 days, then 100 mg daily for 7 days, then 200  "mg daily thereafter, Disp: 42 each, Rfl: 0    Venetoclax 100 MG TABS, Take 2 tablets (200 mg total) by mouth daily, Disp: 60 tablet, Rfl: 5    vitamin B-12 (CYANOCOBALAMIN) 500 MCG TABS, Take 1 tablet (500 mcg total) by mouth daily, Disp: 30 tablet, Rfl: 0    zolpidem (AMBIEN) 5 mg tablet, Take 1/2-1 full tablet at bedtime as needed for sleep.  Do not take if drinking any alcohol or taking lorazepam, Disp: 90 tablet, Rfl: 0          Whom besides the patient is providing clinical information about today's encounter?   NO ADDITIONAL HISTORIAN (patient alone provided history)    Physical Exam and Assessment/Plan by Diagnosis:    Immunosuppressed    Main Reason for Immunosuppression:    Chemotherapy, CLL    Additional History of Immunosuppression (please provide details):    Infusion    Plan:  This diagnosis of \"Immunosuppression (HCC) [ID:  [id number]\" should be added to the patient's permanent PROBLEM LIST  Patient requires full skin exams every year with Dr/NP/PA       HISTORY OF SQUAMOUS CELL CARCINOMA     Physical Exam:  Anatomic Location Affected:  left clavicle with well healed scar      Additional History of Present Condition:  History of squamous cell carcinoma with no signs of recurrence.    Case Report   Surgical Pathology Report                         Case: [id number]                                   Authorizing Provider:  MATHEW Morrison Collected:           01/09/2024 1159               Ordering Location:     [facility]      Received:            01/09/2024 [address] [place] [address]                                                                       Pathologist:           Franca Kamara MD                                                           Specimen:    Skin, Other, Specimen A: left clavicle                                                    Final Diagnosis   A. Skin, left clavicle, shave biopsy:     At least SQUAMOUS CELL CARCINOMA IN SITU; transected.   "       Electronically signed by Franca Kamara MD on 1/13/2024 at 12:53 PM     Assessment and Plan:  Based on a thorough discussion of this condition and the management approach to it (including a comprehensive discussion of the known risks, side effects and potential benefits of treatment), the patient (family) agrees to implement the following specific plan:  Monitor for change or recurrence    How can SCC be prevented?  The most important way to prevent SCC is to avoid sunburn. This is especially important in childhood and early life. Fair skinned individuals and those with a personal or family history of BCC should protect their skin from sun exposure daily, year-round and lifelong.  Stay indoors or under the shade in the middle of the day   Wear covering clothing   Apply high protection factor SPF50+ broad-spectrum sunscreens generously to exposed skin if outdoors   Avoid indoor tanning (sun beds, solaria)      What is the outlook for SCC?  Most SCC are cured by treatment. Cure is most likely if treatment is undertaken when the lesion is small. A small percent of SCC's can spread to lymph  nodes and long term monitoring is indicated.   They are also at increased risk of other skin cancers, especially melanoma. Regular self-skin examinations and long-term annual skin checks by an experienced health professional are recommended.    SEBORRHEIC KERATOSES  - Relevant exam: Scattered over the trunk/extremities are waxy brown to black plaques and papules with stuck on appearance  - Exam and clinical history consistent with seborrheic keratoses  - Counseled that these are benign growths that do not require treatment  - Counseled that removal of lesions is considered cosmetic and so would incur a fee should patient elect to move forward.   - Patient to hold on treatments for now but will inform us should they desire additional treatments    MELANOCYTIC NEVI  -Relevant exam: Scattered over the trunk/extremities are  homogenously pigmented brown macules and papules. ELM performed and without concerning findings.  - Exam and clinical history consistent with melanocytic nevi  - Educated on the ABCDE's of melanoma; handout provided  - Counseled to return to clinic prior to scheduled appointment should any of these lesions change or should any new lesions of concern arise  - Counseled on use of sun protection daily. Reviewed latest FDA sunscreen guidelines, including use of broad spectrum (UVA and UVB blocking) sunscreen or sun protective clothing with SPF 30-50 every 2-3 hours and reapplied after exposure to water; use of photoprotective clothing, including a broad brim hat and UPF rated clothing if outdoors for several hours; avoid use of tanning beds as these pose significant risk for melanoma and skin cancer.    LENTIGINES  OTHER SKIN CHANGES DUE TO CHRONIC EXPOSURE TO NONIONIZING RADIATION  - Relevant exam: Over sun exposed areas are brown macules. ELM performed and without concerning findings.  - Exam and clinical history consistent with lentigines.  - Educated that these are indicative of prior sun exposure.   - Counseled to return to clinic prior to scheduled appointment should any of these lesions change or should any new lesions of concern arise.  - Recommended use of sunscreen as above and below.  - Counseled on use of sun protection daily. Reviewed latest FDA sunscreen guidelines, including use of broad spectrum (UVA and UVB blocking) sunscreen or sun protective clothing with SPF 30-50 every 2-3 hours and reapplied after exposure to water; use of photoprotective clothing, including a broad brim hat and UPF rated clothing if outdoors for several hours; avoid use of tanning beds as these pose significant risk for melanoma and skin cancer.    CHERRY ANGIOMAS  - Relevant exam: Scattered over the trunk/extremities are red papules  - Exam and clinical history consistent with cherry angiomas  - Educated that these are benign  -  Educated that removal is considered aesthetic and would incur a fee.  - Patient does not wish to pursue removal at this time but will contact us should this change.    RASH    Physical Exam:  (Anatomic Location); (Size and Morphological Description); (Differential Diagnosis):  A: Left proximal thigh; net like vascular eruption; rule out livedo reticularis: H&E  B: Left distal thigh; net like vascular eruption; rule out livedo reticularis: Diff  Pertinent Positives:        Pertinent Negatives:    Additional History of Present Condition:  Rash for 4 weeks. Occurred after augmentin treatment. Patient with recent change in CLL status with need for now chemotherapy.     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 Medium to large vessel vasculitis can also present with similar findings. As such, biopsies for H&E and DIF performed today to confirm diagnosis. Will send labwork for coagulation studies as patient is at increased risk for clots in the setting of malignancy and it would be important to know if there is another reason why he could be at further increased risk (which this rash may suggest).   Based on a thorough discussion of this condition and the management approach to it (including a comprehensive discussion of the known risks, side effects and potential benefits of treatment), the patient (family) agrees to implement the following specific plan:  Punch biopsy x2  performed  Labs: Coagulation studies sent (of note, RF sent as a means of testing for cryoglobulinemia)    PROCEDURE NOTE:  PUNCH BIOPSY      Performing Physician:     Anatomic Location; Clinical Description with size (cm); Pre-Op Diagnosis:   A: Left proximal thigh; net like vascular eruption; rule out livedo reticularis: H&E  B: Left distal thigh; net like vascular eruption; rule out livedo reticularis:  Diff       Anesthesia: 2% Xylocaine with epi      Topical anesthesia: None       Indications: To indicate diagnosis and management plan.    Procedure Details     Patient informed of the risks (including bleeding,scaring and infection) and benefits of the procedure explained. Verbal and written informed consent obtained. The area was prepped and draped in the usual fashion. Anesthesia was obtained with 1% lidocaine with epinephrine. The skin was then stretched perpendicular to the skin tension lines and a punch biopsy to an appropriate sampling depth was obtained with a 4 mm punch with a forceps and iris scissors.     Hemostasis was obtained with 3-0 Prolene x 2 sutures 1 each.     Complications:  None      Specimen has been sent for review by Dermatopathology.      Plan:  1. Instructed to keep the wound dry and covered for 24-48h and clean thereafter.  2. Warning signs of infection were reviewed.    3. Recommended that the patient use acetaminophen as needed for pain  4. Sutures if any should be removed in 14 days      Standard post-procedure care has been explained and has been included in written form within the patient's copy of Informed Consent.    PRURITUS    Physical Exam:  Anatomic Location Affected:  diffuse   Morphological Description:  thin xerotic plaques  Pertinent Positives:  Pertinent Negatives:    Additional History of Present Condition:  Reported by patient.    Assessment and Plan:  Based on a thorough discussion of this condition and the management approach to it (including a comprehensive discussion of the known risks, side effects and potential benefits of treatment), the patient (family) agrees to implement the following specific plan:  Start daily emollient to moist skin.  Take luke warm showers (not hot)  Avoid scratching.  Transition to fragrance free, non-irritating skin care as possible.  Tip for relief: If itchy or uncomfortable, placing emollient (like vaseline) in the fridge is a great  trick as the cool sensation is soothing.        What is pruritus?  Pruritus is the medical term for itch. Itch is an unpleasant sensation on the skin that provokes the desire to rub or scratch the area to obtain relief. Itch can cause discomfort and frustration; in severe cases it can lead to disturbed sleep, anxiety and depression. Constant scratching to obtain relief can damage the skin (excoriation, lichenification) and reduce its effectiveness as a major protective barrier.     Pruritus is often a symptom of an underlying disease process such as a skin problem, a systemic disease, or abnormal nerve impulses.    What are skin signs of pruritus?   There are no specific skin signs associated with pruritus, apart from scratch marks (excoriations) and signs of the underlying condition.   Persistent scratching over a period of time may lead to:  Lichenification (thickened skin, lichen simplex)   Prurigo papules and nodules.    Who gets pruritus?  The epidemiology of pruritus depends on its underlying cause or causes. However, in general, the incidence of chronic pruritus increases with age, it is more common in women, and in those of  background.     Mechanisms underlying pruritus  Itch, like pain, can originate anywhere along the neural itch pathway, from the central nervous system (brain and spinal cord) to the peripheral nervous system and the skin.Mechanisms underlying pruritus are complex.  The itch signal is transmitted mainly through small, itch-selective C-fibers in the skin in addition to histamine-triggered and non-histaminergic neurons.   These connect with secondary neurons which cross the opposite side of the spinothalamic tract and ascend to parts of the brain involved in sensation, emotion, reward and memory. These areas overlap with those activated by pain.   Patients with chronic pruritus usually have both peripheral and central hypersensitization (heightened reaction) which means they tend to  overreact to noxious stimuli which normally inhibit itch (such as heat and scratching) and also misinterpret non-noxious stimuli as an itch (eg, light touch)    The way scratching stops itching has been explained by an interaction with pain pathways within the dorsal horn of the spinal cord.    Localized pruritus  Localized pruritus is pruritus that is confined to a certain part of the body. It can occur in association with a primary rash (eg dermatitis) or may occur because of hypersensitive nerves in the skin (neuropathic pruritus). Neuropathic pruritus is due to compression or degeneration of nerves in the skin, on route to the spine or in the spine itself. Neuropathic itch is sometimes associated with reduced or absent sweating in the affected area of skin.  Typical causes of localized itchy rashes  Scalp: seborrhoeic dermatitis, head lice   Back: Lopeno disease   Hands: pompholyx, irritant and/or allergic contact dermatitis   Genitals: vulvovaginal Candida albicans infection, lichen sclerosus   Legs: venous eczema   Feet: tinea pedis    Neuropathic causes of localized pruritus without primary rash  Face: trigeminal trophic syndrome   Hand: cheiralgia paraesthetica   Arm: brachioradial pruritus   Back: notalgia paraesthetica/topics/brachioradial-pruritus/   Genital: pruritus vulvae, pruritus ani   Dermatomal: herpes zoster (shingles) during recovery phase  Scratching a localised itch may lead to lichen simplex, prurigo or prurigo nodularis.    Systemic causes of pruritus  Sytemic diseases may cause generalised pruritus. This is sometimes called metabolic itch. There is nothing wrong with the skin itself, at least until it's been scratched.  Metabolic disorders include chronic renal failure (dialysis) and liver disease (with or without cholestasis).  Uraemic pruritus arises in patients undergoing dialysis is due to a combination of xerosis (dry skin), secondary hyperparathyroidism, peripheral neuropathy (nerve  changes) and inflammation.   Secondary hyperparathyroidism which also occurs in dialysis patients leads to microprecipitation (deposition) of calcium and magnesium salts in the skin, triggering mast cell degeneration, releasing serotonin and histamine.   Once chronic pruritus has occurred, there may be secondary changes in the nerves in the skin and central nervous system which heighten the sensation of itch.    Hepatogenic pruritus is more common in intrahepatic than extrahepatic cholestasis. Examples of intrahepatic cholestasis is associated with chronic viral hepatitis, primary biliary cirrhosis, pregnancy-related cholestasis. Extra-hepatic cholestasis is associated with pressure on the bile ducts, eg from pancreatic tumours or pseudocysts.   Cholestasis is thought to release toxic substances from the liver, which stimulates neural itch fibres in the skin.   Characteristically, cholestatic pruritus is most severe at night; it tends to affect the hands, feet and areas where clothes are rubbing on the skin.    Haematological disorders include iron deficiency anaemia and polycythaemia vera.  Generalized pruritus along with glossitis (tongue inflammation) and angular cheilitis (inflammation of mouth corners) are seen in iron deficiency anaemia.   In polycythaemia vera, itch is usually precipitated by contact with water (aquagenic pruritus), eg after a shower. This is thought to be mediated by the effect of platelets, serotonin and prostaglandins.    Endocrine disorders include thyroid disease and diabetes mellitus.  In Graves' disease (thyrotoxicosis), increased blood flow, skin temperature and decreased itch threshold mediated by the increase in thyroid hormones, lead to the itch. Pruritus associated with myxoedema and hypothyroidism is rare, and if present, is more likely the result of xerosis (dry skin).   In diabetes mellitus, localized itch tends to occur in the perianal/genital region usually due to Candida  albicans or dermatophyte infections. It is unclear if metabolic abnormalities such as renal impairment, autonomic failure or diabetic neuropathy contribute to this.    Paraneoplastic itch is associated with lymphoma, especially Hodgkin lymphoma, leukemia or a solid organ tumor (eg lung, colon, brain).  In Hodgkin lymphoma, pruritus is thought to be caused by histamine release, which may be related to eosinophilia.    Infections causing itch include human immunodeficiency virus infection (HIV) and hepatitis C virus.  Patients with HIV commonly complain of itch. This may be associated with skin infections/infestations, dry skin, drug reactions, hyperoesinophilia (increased eosinophil levels) and cutaneous T cell lymphoma. There is a possible correlation between intractable pruritus and increased HIV viral load.   In chronic hepatitis C infection, the mechanisms responsible for itch remain unclear. In the absence of cholestasis, pruritus may be related to antiviral therapy; it has been noted to occur in patients treated with combination therapy (interferon pool and ribavirin).    Pruritic skin diseases  Pruritus is often a symptom of many skin diseases. Some of these are included in the following list.  Allergic contact dermatitis   Dry skin   Urticaria   Psoriasis   Atopic dermatitis   Folliculitis   Dermatitis herpetiformis   Lichen simplex   Lichen planus   Bullous pemphigoid   Lice   Scabies   Miliaria   Sunburn   Pityriasis rosea   Mycosis fungoides    Exposure-related pruritus  Pruritus may arise as a result of exposure to certain external factors.  Allergens or irritants   Cold, which can cause 'winter itch'   A physical urticaria, such as dermographism   Aquagenic pruritus (itch on exposure to water)   Insects and infestations, eg scabies   Medications (topical or systemic) eg opioids, aspirin    Hormonal reasons for pruritus  About 2% of pregnant women have pruritus without any obvious dermatological cause. In  some cases the itch is due to cholestasis (pooling of bile in the gall bladder and liver). It usually occurs in the 3rd trimester and is relieved after giving birth.  Generalized itch is also a common symptom of menopause.    How is pruritus diagnosed?  The first steps of evaluation of an itchy patient are medical history and examination.  A thorough history can identify constitutional symptoms that may point towards an underlying systemic disease. Drug triggers such as opioids may be identified, especially if the commencement of the drug relates to the itch.    A careful examination can identify dermatological causes for the itch (eg scabies, lichen simplex, pemphigoid) or evidence of chronic skin changes related to the itch. In dermatological causes of pruritus, primary skin lesions will usually suggest the diagnosis. Patients without primary skin lesions and little evidence of chronic scratching should be investigated for systemic, neuropathic and psychogenic causes.  The panel of investigations could include:  Full/complete blood count   Creatinine and renal function tests   Liver function tests   Thyroid function tests   Erythrocyte sedimentation rate   Chest radiography   HIV serology    What treatment is available for itch?  The management of pruritus relies on establishing the cause and then either removing or treating the cause to prevent further itching. In many cases, tests are necessary to determine the cause; while these are in progress, treatment to provide symptomatic relief of pruritus may be given.    Topical treatments  In addition to specific therapy for any underlying skin or internal disease, topical treatment may include:  Wet dressings or tepid shower to cool the skin   Calamine lotion (contains phenol, which cools the skin): avoid on dry skin and limit use to a few days   Menthol/camphor lotion: gives a chilling sensation   Local anesthetics, such as pramoxine (also called pramocaine),  applied to small itchy spots such as insect bites   Regular use of emollients, especially if skin is dry   Mild topical corticosteroids for short periods   Topical calcineurin inhibitors are also used to reduce itch associated with inflammatory skin conditions   Topical doxepin, a tricyclic antidepressant and antihistamine, is an antipruritic used in eczema.    Other measures that can be useful in preventing pruritus include avoiding precipitating factors such as rough clothing or fabrics, overheating, and vasodilators if they provoke itching (eg, caffeine, alcohol, spices). Fingernails should be kept short and clean. If the urge to scratch is irresistible then rub the area with your palm.  Topical antihistamines should not be used for chronic itch, as they may sensitize the skin and result in allergic contact dermatitis.    Systemic therapy  If pruritus is severe and sleep is disturbed, then treatment with oral medication may be necessary. Some drugs may help to relieve the itch whilst others are given solely for their sedative effects.  Antihistamines are most useful in urticaria, in which histamine is released. Use for other pruritic conditions is not supported by randomized control trials. Sedating antihistamines may be used for their sedative effects.   Doxepin and amitriptyline are tricyclic antidepressants have antipruritic action and act on the central and peripheral nervous systems.   Tetracyclic antidepressants such as mirtazepine and selective serotonin reuptake inhibitors (paroxetine, sertraline, fluoxetine) may also help some patients with severe itch including when it is caused by cholestasis, T-cell lymphoma, malignancy or a neuropathic condition.   Anti-epileptic drugs such as sodium valproate, gabapentin and pregabalin may also be of benefit to some patients, e.g., those with itch associated with renal failure or neuropathic itch. The mechanism of action is uncertain.   Opioid antagonists such as  butorphanol intranasal spray, naltrexone tablets, and naloxone have been effective in patients suffering from intractable pruritus in association with liver disease, atopic eczema and chronic urticaria. Nalfurafine, which is a kappa-opioid agonist has also been studied and shown to reduce itch associated with chronic renal impairment, however it is not widely available.   Aspirin is sometimes effective if pruritus is mediated by kinins or prostaglandins and is noted to be effective in patients with pruritus due to polycythaemia vera. Note: aspirin may cause or aggravate itch in some patients.   Thalidomide has been successful in treating nodular prurigo and chronic pruritus of various kinds but is rarely used because of serious adverse effects and expense.   Rifampicin is effective for patients with pruritus associated with cholestasis (some forms of liver disease).     Isolated case reports in severe itch associated with malignancy have reported success with the NKR1 antagonist, aprepitant (normally used short-term for postoperative or chemotherapy-induced nausea). This is under investigation for neuropathic itch and nodular prurigo.    Phototherapy  Broadband ultraviolet B or narrow-band UVB phototherapy alone, or in conjunction with UVA, has been shown to be helpful for pruritus associated with chronic kidney disease, psoriasis, atopic eczema and cutaneous T-cell lymphoma.    Behavioral therapy  Behavioral therapy may be used in conjunction with pharmacotherapy to modify behaviours such as coping mechanisms and stress reduction, which help interrupt the itch-scratch cycle. One randomized controlled trial showed short-term benefits in reduction in itch frequency and scratching as well as improvement in coping mechanisms.    What is the outcome for pruritus?  The management of chronic severe itch is difficult and often requires the use of combination therapy over a long period of time. Identification and treatment  of underlying conditions causing pruritus may help in this process. The symptom may quickly disappear or persist for long periods of time.      Scribe Attestation      I,:  Anahy Godinez MA am acting as a scribe while in the presence of the attending physician.:       I,:  Coleman Hawthorne MD personally performed the services described in this documentation    as scribed in my presence.:

## 2024-09-24 ENCOUNTER — OFFICE VISIT (OUTPATIENT)
Dept: FAMILY MEDICINE CLINIC | Facility: CLINIC | Age: 74
End: 2024-09-24
Payer: MEDICARE

## 2024-09-24 ENCOUNTER — NURSE TRIAGE (OUTPATIENT)
Age: 74
End: 2024-09-24

## 2024-09-24 ENCOUNTER — APPOINTMENT (OUTPATIENT)
Dept: LAB | Facility: HOSPITAL | Age: 74
End: 2024-09-24
Payer: MEDICARE

## 2024-09-24 VITALS
BODY MASS INDEX: 28.28 KG/M2 | SYSTOLIC BLOOD PRESSURE: 122 MMHG | WEIGHT: 186 LBS | OXYGEN SATURATION: 98 % | DIASTOLIC BLOOD PRESSURE: 70 MMHG | HEART RATE: 85 BPM

## 2024-09-24 DIAGNOSIS — K31.819 GAVE (GASTRIC ANTRAL VASCULAR ECTASIA): ICD-10-CM

## 2024-09-24 DIAGNOSIS — I50.42 CHRONIC COMBINED SYSTOLIC AND DIASTOLIC CONGESTIVE HEART FAILURE (HCC): Primary | ICD-10-CM

## 2024-09-24 DIAGNOSIS — R23.1 LIVEDO RETICULARIS: ICD-10-CM

## 2024-09-24 DIAGNOSIS — D59.10 AUTOIMMUNE HEMOLYTIC ANEMIA (HCC): ICD-10-CM

## 2024-09-24 DIAGNOSIS — J18.9 PNEUMONIA OF RIGHT MIDDLE LOBE DUE TO INFECTIOUS ORGANISM: ICD-10-CM

## 2024-09-24 DIAGNOSIS — C91.10 CLL (CHRONIC LYMPHOCYTIC LEUKEMIA) (HCC): ICD-10-CM

## 2024-09-24 DIAGNOSIS — E80.6 HYPERBILIRUBINEMIA: ICD-10-CM

## 2024-09-24 DIAGNOSIS — D64.9 ANEMIA, UNSPECIFIED TYPE: ICD-10-CM

## 2024-09-24 DIAGNOSIS — I27.20 PULMONARY HYPERTENSION (HCC): Chronic | ICD-10-CM

## 2024-09-24 DIAGNOSIS — I45.81 LONG QT SYNDROME TYPE 2: ICD-10-CM

## 2024-09-24 DIAGNOSIS — D51.9 ANEMIA DUE TO VITAMIN B12 DEFICIENCY, UNSPECIFIED B12 DEFICIENCY TYPE: ICD-10-CM

## 2024-09-24 DIAGNOSIS — C91.10 CLL (CHRONIC LYMPHOCYTIC LEUKEMIA) (HCC): Chronic | ICD-10-CM

## 2024-09-24 DIAGNOSIS — F33.9 DEPRESSION, RECURRENT (HCC): ICD-10-CM

## 2024-09-24 DIAGNOSIS — K21.9 GASTROESOPHAGEAL REFLUX DISEASE: ICD-10-CM

## 2024-09-24 DIAGNOSIS — K21.9 GASTROESOPHAGEAL REFLUX DISEASE WITHOUT ESOPHAGITIS: ICD-10-CM

## 2024-09-24 DIAGNOSIS — I48.0 PAROXYSMAL A-FIB (HCC): ICD-10-CM

## 2024-09-24 DIAGNOSIS — E78.00 PURE HYPERCHOLESTEROLEMIA, UNSPECIFIED: ICD-10-CM

## 2024-09-24 DIAGNOSIS — I10 ESSENTIAL HYPERTENSION: Chronic | ICD-10-CM

## 2024-09-24 PROBLEM — R21 RASH: Status: RESOLVED | Noted: 2024-09-02 | Resolved: 2024-09-24

## 2024-09-24 LAB
ALBUMIN SERPL BCG-MCNC: 4.2 G/DL (ref 3.5–5)
ALP SERPL-CCNC: 98 U/L (ref 34–104)
ALT SERPL W P-5'-P-CCNC: 38 U/L (ref 7–52)
ANION GAP SERPL CALCULATED.3IONS-SCNC: 6 MMOL/L (ref 4–13)
ANISOCYTOSIS BLD QL SMEAR: PRESENT
AST SERPL W P-5'-P-CCNC: 20 U/L (ref 13–39)
BASOPHILS # BLD MANUAL: 0.08 THOUSAND/UL (ref 0–0.1)
BASOPHILS NFR MAR MANUAL: 1 % (ref 0–1)
BILIRUB DIRECT SERPL-MCNC: 0.52 MG/DL (ref 0–0.2)
BILIRUB SERPL-MCNC: 2.14 MG/DL (ref 0.2–1)
BUN SERPL-MCNC: 12 MG/DL (ref 5–25)
CALCIUM SERPL-MCNC: 9.3 MG/DL (ref 8.4–10.2)
CHLORIDE SERPL-SCNC: 97 MMOL/L (ref 96–108)
CO2 SERPL-SCNC: 31 MMOL/L (ref 21–32)
CREAT SERPL-MCNC: 0.73 MG/DL (ref 0.6–1.3)
DEPRECATED AT III PPP: 124 % OF NORMAL (ref 92–136)
EOSINOPHIL # BLD MANUAL: 0.08 THOUSAND/UL (ref 0–0.4)
EOSINOPHIL NFR BLD MANUAL: 1 % (ref 0–6)
ERYTHROCYTE [DISTWIDTH] IN BLOOD BY AUTOMATED COUNT: 25.8 % (ref 11.6–15.1)
FERRITIN SERPL-MCNC: 651 NG/ML (ref 24–336)
GFR SERPL CREATININE-BSD FRML MDRD: 92 ML/MIN/1.73SQ M
GLUCOSE P FAST SERPL-MCNC: 117 MG/DL (ref 65–99)
HCT VFR BLD AUTO: 26.2 % (ref 36.5–49.3)
HGB BLD-MCNC: 9 G/DL (ref 12–17)
LDH SERPL-CCNC: 240 U/L (ref 140–271)
LYMPHOCYTES # BLD AUTO: 4.93 THOUSAND/UL (ref 0.6–4.47)
LYMPHOCYTES # BLD AUTO: 60 % (ref 14–44)
MACROCYTES BLD QL AUTO: PRESENT
MCH RBC QN AUTO: 34.9 PG (ref 26.8–34.3)
MCHC RBC AUTO-ENTMCNC: 34.4 G/DL (ref 31.4–37.4)
MCV RBC AUTO: 102 FL (ref 82–98)
MONOCYTES # BLD AUTO: 0.24 THOUSAND/UL (ref 0–1.22)
MONOCYTES NFR BLD: 3 % (ref 4–12)
MYELOCYTE ABSOLUTE CT: 0.08 THOUSAND/UL (ref 0–0.1)
MYELOCYTES NFR BLD MANUAL: 1 % (ref 0–1)
NEUTROPHILS # BLD MANUAL: 2.67 THOUSAND/UL (ref 1.85–7.62)
NEUTS SEG NFR BLD AUTO: 33 % (ref 43–75)
NRBC BLD AUTO-RTO: 2 /100 WBC (ref 0–2)
PLATELET # BLD AUTO: 223 THOUSANDS/UL (ref 149–390)
PLATELET BLD QL SMEAR: ADEQUATE
PMV BLD AUTO: 9.9 FL (ref 8.9–12.7)
POLYCHROMASIA BLD QL SMEAR: PRESENT
POTASSIUM SERPL-SCNC: 4.2 MMOL/L (ref 3.5–5.3)
PROT SERPL-MCNC: 7.1 G/DL (ref 6.4–8.4)
RBC # BLD AUTO: 2.58 MILLION/UL (ref 3.88–5.62)
RBC MORPH BLD: PRESENT
RETICS # AUTO: ABNORMAL 10*3/UL (ref 14356–105094)
RETICS # CALC: 11.23 % (ref 0.37–1.87)
SODIUM SERPL-SCNC: 134 MMOL/L (ref 135–147)
URATE SERPL-MCNC: 5.1 MG/DL (ref 3.5–8.5)
VARIANT LYMPHS # BLD AUTO: 1 %
WBC # BLD AUTO: 8.08 THOUSAND/UL (ref 4.31–10.16)

## 2024-09-24 PROCEDURE — 85305 CLOT INHIBIT PROT S TOTAL: CPT

## 2024-09-24 PROCEDURE — 85302 CLOT INHIBIT PROT C ANTIGEN: CPT

## 2024-09-24 PROCEDURE — 85045 AUTOMATED RETICULOCYTE COUNT: CPT

## 2024-09-24 PROCEDURE — 85705 THROMBOPLASTIN INHIBITION: CPT

## 2024-09-24 PROCEDURE — 83615 LACTATE (LD) (LDH) ENZYME: CPT

## 2024-09-24 PROCEDURE — 84550 ASSAY OF BLOOD/URIC ACID: CPT

## 2024-09-24 PROCEDURE — 85027 COMPLETE CBC AUTOMATED: CPT

## 2024-09-24 PROCEDURE — 99495 TRANSJ CARE MGMT MOD F2F 14D: CPT | Performed by: FAMILY MEDICINE

## 2024-09-24 PROCEDURE — 85007 BL SMEAR W/DIFF WBC COUNT: CPT

## 2024-09-24 PROCEDURE — 85598 HEXAGNAL PHOSPH PLTLT NEUTRL: CPT

## 2024-09-24 PROCEDURE — 85732 THROMBOPLASTIN TIME PARTIAL: CPT

## 2024-09-24 PROCEDURE — 36415 COLL VENOUS BLD VENIPUNCTURE: CPT

## 2024-09-24 PROCEDURE — 85613 RUSSELL VIPER VENOM DILUTED: CPT

## 2024-09-24 PROCEDURE — 80053 COMPREHEN METABOLIC PANEL: CPT

## 2024-09-24 PROCEDURE — 82248 BILIRUBIN DIRECT: CPT

## 2024-09-24 PROCEDURE — 85306 CLOT INHIBIT PROT S FREE: CPT

## 2024-09-24 PROCEDURE — 85670 THROMBIN TIME PLASMA: CPT

## 2024-09-24 PROCEDURE — 82728 ASSAY OF FERRITIN: CPT

## 2024-09-24 PROCEDURE — 85300 ANTITHROMBIN III ACTIVITY: CPT

## 2024-09-24 RX ORDER — OMEPRAZOLE 40 MG/1
CAPSULE, DELAYED RELEASE ORAL
Qty: 100 CAPSULE | Refills: 3 | Status: SHIPPED | OUTPATIENT
Start: 2024-09-24

## 2024-09-24 RX ORDER — ATORVASTATIN CALCIUM 20 MG/1
20 TABLET, FILM COATED ORAL DAILY
Qty: 100 TABLET | Refills: 3 | Status: SHIPPED | OUTPATIENT
Start: 2024-09-24

## 2024-09-24 RX ORDER — ATORVASTATIN CALCIUM 20 MG/1
TABLET, FILM COATED ORAL
Qty: 90 TABLET | Refills: 3 | Status: CANCELLED | OUTPATIENT
Start: 2024-09-24

## 2024-09-24 NOTE — ASSESSMENT & PLAN NOTE
Wt Readings from Last 3 Encounters:   09/24/24 84.4 kg (186 lb)   09/23/24 85.1 kg (187 lb 11.2 oz)   09/19/24 84.2 kg (185 lb 9.6 oz)     CHF looks to be stable at this time.

## 2024-09-24 NOTE — ADDENDUM NOTE
Encounter addended by: Adrianne Caldera, Pharmacist on: 9/24/2024 9:07 AM   Actions taken: [employer]ent created or edited

## 2024-09-24 NOTE — ASSESSMENT & PLAN NOTE
he notes he is very stressed since his last hospitalization but feels he is stable at this time.

## 2024-09-24 NOTE — PROGRESS NOTES
Transition of Care Visit  Name: Murphy Willams      : 1950      MRN: [id number]  Encounter Provider: Pravin Ortega Jr, MD  Encounter Date: 2024   [facility]    Assessment & Plan  CLL (chronic lymphocytic leukemia) (HCC)  Continue close follow-up with oncology.       Autoimmune hemolytic anemia (HCC)  Fortunately, his numbers have stabilized.  Continue close follow-up with heme-onc       Pneumonia of right middle lobe due to infectious organism  He will have his chest x-ray repeated prior to following with pulmonary in early October.       Pure hypercholesterolemia, unspecified  Continue atorvastatin.  Orders:    atorvastatin (LIPITOR) 20 mg tablet; Take 1 tablet (20 mg total) by mouth daily    Gastroesophageal reflux disease  Continue omeprazole.  Orders:    omeprazole (PriLOSEC) 40 MG capsule; Take one capsule by mouth once every day.    Chronic combined systolic and diastolic congestive heart failure (HCC)  Wt Readings from Last 3 Encounters:   24 84.4 kg (186 lb)   24 85.1 kg (187 lb 11.2 oz)   24 84.2 kg (185 lb 9.6 oz)     CHF looks to be stable at this time.               Anemia due to vitamin B12 deficiency, unspecified B12 deficiency type  Continue close monitoring of anemia which is due to multiple sources       Depression, recurrent (HCC)   he notes he is very stressed since his last hospitalization but feels he is stable at this time.         Livedo reticularis  Rash does seem to be stable and not spreading.       Long QT syndrome type 2  Care should be taken with any prescriptions       Pulmonary hypertension (HCC)  His chronic shortness of breath is stable       Paroxysmal A-fib (HCC)  Continue current regimen as well as close cardiology follow-up.       Essential hypertension  Blood pressure is well-controlled at this time.  Continue current regimen.       GAVE (gastric antral vascular ectasia)  Fortunately, this  "seems to be stable.  Continue monitoring of CBC.       Gastroesophageal reflux disease without esophagitis  Is having no heartburn at this time            History of Present Illness     Transitional Care Management Review:   Murphy Willams is a 74 y.o. male here for TCM follow up.     During the TCM phone call patient stated:  TCM Call       Date and time call was made  9/16/2024  2:40 PM    Hospital care reviewed  Records reviewed    Patient was hospitial[facility] at  [facility]    Date of Admission  09/02/24    Date of discharge  09/12/24    Diagnosis  CLL    Disposition  Home    Were the patients medications reviewed and updated  No    Current Symptoms  None    Shortness of breath severity  Mild    Weakness severity  Mild    Fatigue severity  Moderate          TCM Call       Post hospital issues  None    Should patient be enrolled in anticoag monitoring?  No    Scheduled for follow up?  Yes  [place] 9.24.24    Did you obtain your prescribed medications  Yes    Do you need help managing your prescriptions or medications  No    Is transportation to your appointment needed  No    I have advised the patient to call PCP with any new or worsening symptoms  Thea W, RMA    Living Arrangements  Alone    Are you recieving any outpatient services  Yes    What type of services  Home Healt Care    Are you recieving home care services  Yes    Types of home care services  Hospice; Nurse visit          HPI patient follows today for TCM.  He was hospitalized from 9/2 - 9/12/2024.  He was followed closely by heme-onc for significant elevation of white count as well as profound anemia.  Treatment went as follows per oncology:    \"hospitalized earlier this month with pneumonia and was found to have profound anemia and there was no evidence of bleeding.  Some evidence of hemolysis, increased reticulocyte and increased bilirubin and that was primarily indirect bilirubin.  Patient received blood transfusions in the hospital.  He " "was not a good candidate for BTK inhibitors because of paroxysmal atrial fibrillation and Xarelto.  Xarelto was recently changed to Pradaxa.  Patient received day 1 and day 2 treatment with Gazyva and that will be continued and to that venetoclax will be added or Gazyva will be replaced by venetoclax.  He was running more than 200,000 WBC, primarily lymphocytes and WBC count is coming down.  Patient has tiredness and weakness and exertional dyspnea.  He has prolonged QT interval and has dual-chamber/biventricular AICD that was placed in March 2023.  Question came up of Benadryl in the premedication and his cardiologist had sent me a message and okayed the use of Benadryl.  Patient would like to have Benadryl.  He states when he received Benadryl he did not have reaction to Gazyva.   He has generalized skin rash and he thinks that is from Augmentin.Rash started after he was started on Augmentin and was there before he was started on allopurinol.  CLL was diagnosed in 2010 and patient had not required therapy for that until now.      Patient had iron deficiency and had GI evaluation and had intravenous Venofer.  He follows with his gastroenterologist.   In May 2021 he was found to have profound autoimmune hemolytic anemia with significant drop in hemoglobin and he was treated with prednisone.\"    Presents today noting that he still feels somewhat tired but feels he is really improving.  He denies excessive lower extremity swelling, chest pain or chest tightness.  He has some ongoing fatigue.  He remains very anxious about his medical issues.  Review of Systems   Constitutional:  Positive for fatigue. Negative for appetite change, chills, fever and unexpected weight change.   HENT:  Negative for trouble swallowing.    Eyes:  Negative for visual disturbance.   Respiratory:  Positive for cough and shortness of breath. Negative for chest tightness and wheezing.    Cardiovascular:  Negative for chest pain, palpitations and " leg swelling.   Gastrointestinal:  Negative for abdominal distention, abdominal pain, blood in stool, constipation and diarrhea.   Endocrine: Negative for polyuria.   Genitourinary:  Negative for difficulty urinating and flank pain.   Musculoskeletal:  Negative for arthralgias and myalgias.   Skin:  Negative for rash.   Neurological:  Negative for dizziness and light-headedness.   Hematological:  Negative for adenopathy. Does not bruise/bleed easily.   Psychiatric/Behavioral:  Negative for dysphoric mood and sleep disturbance. The patient is nervous/anxious.      Objective     /70   Pulse 85   Wt 84.4 kg (186 lb)   SpO2 98%   BMI 28.28 kg/m²     Physical Exam  Constitutional:       General: He is not in acute distress.     Appearance: Normal appearance. He is well-developed. He is ill-appearing. He is not diaphoretic.   HENT:      Head: Normocephalic.      Right Ear: External ear normal.      Left Ear: External ear normal.      Nose: Nose normal.   Eyes:      General:         Right eye: No discharge.         Left eye: No discharge.      Conjunctiva/sclera: Conjunctivae normal.      Pupils: Pupils are equal, round, and reactive to light.   Neck:      Thyroid: No thyromegaly.      Trachea: No tracheal deviation.   Cardiovascular:      Rate and Rhythm: Normal rate and regular rhythm.      Heart sounds: Normal heart sounds. No murmur heard.     No friction rub.   Pulmonary:      Effort: Pulmonary effort is normal. No respiratory distress.      Breath sounds: Normal breath sounds. No wheezing.   Chest:      Chest wall: No tenderness.   Abdominal:      General: There is no distension.      Palpations: There is no mass.      Tenderness: There is no abdominal tenderness. There is no guarding or rebound.      Hernia: No hernia is present.   Musculoskeletal:         General: No swelling or deformity.      Cervical back: Normal range of motion.      Right lower leg: No edema.      Left lower leg: No edema.   Skin:      Findings: No erythema or rash.   Neurological:      General: No focal deficit present.      Mental Status: He is alert.      Cranial Nerves: No cranial nerve deficit.      Coordination: Coordination normal.   Psychiatric:         Thought Content: Thought content normal.     Medications have been reviewed by provider in current encounter    Administrative Statements

## 2024-09-24 NOTE — TELEPHONE ENCOUNTER
Received message including:    THESE ARE THE COVERED MEDICARE CODES FOR THE JAK2      I HAVE TO SUBMIT THE AUTHORIZATION FOR HIS SECONDARY, BUT YOU HAVE TO CHANGE THE DIAGNOSIS - ALSO THE GENETIC TESTING ALWAYS HAS TO BE SCHEDULED IT IS NEVER ALLOWED TO BE A WALK IN TEST.     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 achieved remission   C92.22 Atypical chronic myeloid leukemia, BCR/ABL-negative, in relapse   C93.10 Chronic myelomonocytic leukemia not having achieved remission   C93.12 Chronic myelomonocytic leukemia, in relapse   C93.Z0 Other monocytic leukemia, not having achieved remission   C93.Z2 Other monocytic leukemia, in relapse   C93.90 Monocytic leukemia, unspecified, not having achieved remission   C93.92 Monocytic leukemia, unspecified in relapse   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   C94.41 Acute panmyelosis with myelofibrosis, in remission   C94.42 Acute panmyelosis with myelofibrosis, in relapse   C94.6 Myelodysplastic disease, not elsewhere classified   C95.10 Chronic leukemia of unspecified cell type not having achieved remission   C95.12 Chronic leukemia of unspecified cell type, in relapse   C96.Z Other specified malignant neoplasms of lymphoid, hematopoietic and related tissue   D45 Polycythemia vera   D47.1 Chronic myeloproliferative disease   D47.3 Essential (hemorrhagic) thrombocythemia   D47.4 Osteomyelofibrosis   D47.Z9 Other specified neoplasms of uncertain behavior of lymphoid, hematopoietic and related tissue   D47.9 Neoplasm of uncertain behavior of lymphoid, hematopoietic and related tissue, unspecified   D72.821 Monocytosis (symptomatic)   D72.828 Other elevated white blood cell count   D72.829 Elevated white blood cell count, unspecified   D72.89  Other specified disorders of white blood cells   D72.9 Disorder of white blood cells, unspecified   D75.1 Secondary polycythemia   D75.81 Myelofibrosis   D75.89 Other specified diseases of blood and blood-forming organs   D75.9 Disease of blood and blood-forming organs, unspecified   D77 Other disorders of blood and blood-forming organs in diseases classified elsewhere   R16.1 Splenomegaly, not elsewhere classified   R16.2 Hepatomegaly with splenomegaly, not elsewhere classified     Routing for review.

## 2024-09-24 NOTE — TELEPHONE ENCOUNTER
Casandra West,   I've had this issue before and Kimberly helped me out so I'm going to the source! It's often a PA through us.   Kimberly any advice?

## 2024-09-24 NOTE — TELEPHONE ENCOUNTER
"Pt calling in re labs, states went to lab today and they advised that \"labs were put in wrong\" and would not be covered. Pt aware office mentioned about potential pa for labs. Pt very concerned with having additional oop charges, wants to ensure ordered correctly. Noted will route for review.   "

## 2024-09-25 NOTE — TELEPHONE ENCOUNTER
GRZEGORZ West,   This is in reference to a different test. I wrote back to Kimberly and Sunitha you and Dr. Martin who will be taking over patient care tasks for me.   Regards,   Coleman

## 2024-09-26 ENCOUNTER — TELEPHONE (OUTPATIENT)
Age: 74
End: 2024-09-26

## 2024-09-26 ENCOUNTER — HOSPITAL ENCOUNTER (OUTPATIENT)
Dept: INFUSION CENTER | Facility: CLINIC | Age: 74
Discharge: HOME | End: 2024-09-26
Payer: MEDICARE

## 2024-09-26 VITALS
HEART RATE: 75 BPM | SYSTOLIC BLOOD PRESSURE: 136 MMHG | RESPIRATION RATE: 18 BRPM | TEMPERATURE: 98.5 F | DIASTOLIC BLOOD PRESSURE: 66 MMHG

## 2024-09-26 DIAGNOSIS — C91.10 CLL (CHRONIC LYMPHOCYTIC LEUKEMIA) (HCC): Primary | ICD-10-CM

## 2024-09-26 LAB
PROT C AG PPP IA-ACNC: 97 % (ref 60–150)
PROT S ACT/NOR PPP: 106 % (ref 61–136)
PROT S PPP-ACNC: 69 % (ref 60–150)
SCAN RESULT: NORMAL

## 2024-09-26 PROCEDURE — 96415 CHEMO IV INFUSION ADDL HR: CPT

## 2024-09-26 PROCEDURE — 96413 CHEMO IV INFUSION 1 HR: CPT

## 2024-09-26 PROCEDURE — 96367 TX/PROPH/DG ADDL SEQ IV INF: CPT

## 2024-09-26 RX ORDER — SODIUM CHLORIDE 9 MG/ML
20 INJECTION, SOLUTION INTRAVENOUS ONCE
Status: COMPLETED | OUTPATIENT
Start: 2024-09-26 | End: 2024-09-26

## 2024-09-26 RX ORDER — ACETAMINOPHEN 325 MG/1
650 TABLET ORAL ONCE
Status: COMPLETED | OUTPATIENT
Start: 2024-09-26 | End: 2024-09-26

## 2024-09-26 RX ADMIN — DEXAMETHASONE SODIUM PHOSPHATE 20 MG: 10 INJECTION, SOLUTION INTRAMUSCULAR; INTRAVENOUS at 09:37

## 2024-09-26 RX ADMIN — ACETAMINOPHEN 650 MG: 325 TABLET ORAL at 09:35

## 2024-09-26 RX ADMIN — SODIUM CHLORIDE 20 ML/HR: 0.9 INJECTION, SOLUTION INTRAVENOUS at 09:37

## 2024-09-26 RX ADMIN — OBINUTUZUMAB 1000 MG: 1000 INJECTION, SOLUTION, CONCENTRATE INTRAVENOUS at 10:58

## 2024-09-26 RX ADMIN — DIPHENHYDRAMINE HYDROCHLORIDE 25 MG: 50 INJECTION, SOLUTION INTRAMUSCULAR; INTRAVENOUS at 09:55

## 2024-09-26 NOTE — PROGRESS NOTES
Pt arrived to unit without complaint, tolerated  Gazyva treatment without incident. Pt declined AVS, aware of future appts, next scheduled for 10/9/24 0930

## 2024-09-26 NOTE — TELEPHONE ENCOUNTER
Received a phone call from patient.  Patient stated that he is to start Venetoclax but has not received from pharmacy.  Patient stated that he was expecting it to be mailed to him and is inquiring if able to track it.  Please advise and call patient with update.

## 2024-09-26 NOTE — PLAN OF CARE
Problem: Potential for Falls  Goal: Patient will remain free of falls  Description: INTERVENTIONS:  - Educate patient/family on patient safety including physical limitations  - Instruct patient to call for assistance with activity   - Consult OT/PT to assist with strengthening/mobility   - Keep Call bell within reach  - Keep bed low and locked with side rails adjusted as appropriate  - Keep care items and personal belongings within reach  - Initiate and maintain comfort rounds  - Make Fall Risk Sign visible to staff  - Of  - Apply yellow socks and bracelet for high fall risk patients  - Consider moving patient to room near nurses station  Outcome: Progressing

## 2024-09-27 LAB
APTT HEX PL PPP: 5 SEC (ref 0–11)
APTT SCREEN TO CONFIRM RATIO: 1.13 RATIO (ref 0–1.34)
APTT-LA IMM 4:1 NP PPP: 45.7 SEC (ref 0–40.5)
CONFIRM APTT/NORMAL: 72.9 SEC (ref 0–47.6)
DRVVT IMM 1:2 NP PPP: 51 SEC (ref 0–40.4)
DRVVT SCREEN TO CONFIRM RATIO: 0.9 RATIO (ref 0.8–1.2)
LA PPP-IMP: ABNORMAL
SCREEN APTT: 49.5 SEC (ref 0–43.5)
SCREEN DRVVT: 63.7 SEC (ref 0–47)
THROMBIN TIME: 58.7 SEC (ref 0–23)
TT IMM NP PPP: 44.4 SEC (ref 0–23)
TT P HPASE PPP: 69.1 SEC (ref 0–23)

## 2024-09-27 NOTE — TELEPHONE ENCOUNTER
Spoke with pt and explained Dr. Hanley is out today, and will know what to recommend regarding the interacction. Tried to calm him and explain it is not an emergency. Per [facility] patient was yelling at the tech on the phone. I asked that he please try to be patient, as this med is not needed right away and Dr. Hanley will review with him at his appointment and answer any questions then. Patient ended the call say he doesn't want anyone thinking he was angry and was going to lay down and take an Ativan.

## 2024-09-27 NOTE — TELEPHONE ENCOUNTER
Received a phone call from patient.  Patient stated that he called [facility] and was told that medication has not been mailed d/t contraindications with patient also taking Pradaxa.  Patient verbalized frustration that this was not brought to his attention nor was it addressed by Dr. Hanley.  Pharmacy told patient that they notified Dr. Hanley a week ago.  Patient inquiring if he will be receiving this medication now or switch to a different one.  Patient requesting a call back ASAP.  Patient stated that he will wait two hours and if no call, he will be calling back.

## 2024-09-29 PROCEDURE — 88313 SPECIAL STAINS GROUP 2: CPT | Performed by: STUDENT IN AN ORGANIZED HEALTH CARE EDUCATION/TRAINING PROGRAM

## 2024-09-29 PROCEDURE — 88346 IMFLUOR 1ST 1ANTB STAIN PX: CPT | Performed by: STUDENT IN AN ORGANIZED HEALTH CARE EDUCATION/TRAINING PROGRAM

## 2024-09-29 PROCEDURE — 88305 TISSUE EXAM BY PATHOLOGIST: CPT | Performed by: STUDENT IN AN ORGANIZED HEALTH CARE EDUCATION/TRAINING PROGRAM

## 2024-09-29 PROCEDURE — 88350 IMFLUOR EA ADDL 1ANTB STN PX: CPT | Performed by: STUDENT IN AN ORGANIZED HEALTH CARE EDUCATION/TRAINING PROGRAM

## 2024-09-30 LAB
ANISOCYTOSIS BLD QL SMEAR: PRESENT
LYMPHOCYTES # BLD AUTO: 121.53 THOUSAND/UL (ref 0.6–4.47)
LYMPHOCYTES # BLD AUTO: 5 %
MACROCYTES BLD QL AUTO: PRESENT
MONOCYTES # BLD AUTO: 1.32 THOUSAND/UL (ref 0–1.22)
MONOCYTES NFR BLD AUTO: 1 % (ref 4–12)
NEUTS BAND NFR BLD MANUAL: 1 % (ref 0–8)
NEUTS SEG # BLD: 9.25 THOUSAND/UL (ref 1.81–6.82)
NEUTS SEG NFR BLD AUTO: 6 %
OVALOCYTES BLD QL SMEAR: PRESENT
PATHOLOGY REVIEW: YES
PLATELET BLD QL SMEAR: ADEQUATE
POLYCHROMASIA BLD QL SMEAR: PRESENT
SMUDGE CELLS BLD QL SMEAR: PRESENT
TOTAL CELLS COUNTED SPEC: 100
VARIANT LYMPHS # BLD AUTO: 87 % (ref 0–0)

## 2024-10-01 ENCOUNTER — OFFICE VISIT (OUTPATIENT)
Dept: HEMATOLOGY ONCOLOGY | Facility: CLINIC | Age: 74
End: 2024-10-01
Payer: MEDICARE

## 2024-10-01 VITALS
DIASTOLIC BLOOD PRESSURE: 70 MMHG | SYSTOLIC BLOOD PRESSURE: 126 MMHG | RESPIRATION RATE: 16 BRPM | WEIGHT: 187 LBS | TEMPERATURE: 97.8 F | HEIGHT: 68 IN | OXYGEN SATURATION: 100 % | BODY MASS INDEX: 28.34 KG/M2 | HEART RATE: 88 BPM

## 2024-10-01 DIAGNOSIS — N40.1 BPH WITH OBSTRUCTION/LOWER URINARY TRACT SYMPTOMS: ICD-10-CM

## 2024-10-01 DIAGNOSIS — I48.0 PAROXYSMAL A-FIB (HCC): ICD-10-CM

## 2024-10-01 DIAGNOSIS — D59.10 AUTOIMMUNE HEMOLYTIC ANEMIA (HCC): ICD-10-CM

## 2024-10-01 DIAGNOSIS — R16.2 HEPATOSPLENOMEGALY: ICD-10-CM

## 2024-10-01 DIAGNOSIS — I27.20 PULMONARY HYPERTENSION (HCC): ICD-10-CM

## 2024-10-01 DIAGNOSIS — E87.5 HYPERKALEMIA: ICD-10-CM

## 2024-10-01 DIAGNOSIS — C91.10 CLL (CHRONIC LYMPHOCYTIC LEUKEMIA) (HCC): ICD-10-CM

## 2024-10-01 DIAGNOSIS — N13.8 BPH WITH OBSTRUCTION/LOWER URINARY TRACT SYMPTOMS: ICD-10-CM

## 2024-10-01 DIAGNOSIS — I50.42 CHRONIC COMBINED SYSTOLIC AND DIASTOLIC CONGESTIVE HEART FAILURE (HCC): ICD-10-CM

## 2024-10-01 DIAGNOSIS — Z95.0 PACEMAKER: ICD-10-CM

## 2024-10-01 DIAGNOSIS — C91.10 CLL (CHRONIC LYMPHOCYTIC LEUKEMIA) (HCC): Primary | ICD-10-CM

## 2024-10-01 PROCEDURE — G2211 COMPLEX E/M VISIT ADD ON: HCPCS | Performed by: INTERNAL MEDICINE

## 2024-10-01 PROCEDURE — 99214 OFFICE O/P EST MOD 30 MIN: CPT | Performed by: INTERNAL MEDICINE

## 2024-10-01 RX ORDER — FUROSEMIDE 20 MG
20 TABLET ORAL DAILY
Qty: 30 TABLET | Refills: 5 | OUTPATIENT
Start: 2024-10-01

## 2024-10-01 RX ORDER — FUROSEMIDE 20 MG
20 TABLET ORAL DAILY
Qty: 90 TABLET | Refills: 5 | Status: SHIPPED | OUTPATIENT
Start: 2024-10-01

## 2024-10-01 RX ORDER — ALLOPURINOL 300 MG/1
300 TABLET ORAL DAILY
Qty: 30 TABLET | Refills: 5 | OUTPATIENT
Start: 2024-10-01

## 2024-10-01 NOTE — TELEPHONE ENCOUNTER
Scripts were originally filled from the hospital. Would like to get a 90 day supply.    Reason for call:   [x] Refill   [] Prior Auth  [] Other:     Office:   [] PCP/Provider -   [x] Specialty/Provider - Cardio    Medication:     - allopurinol (ZYLOPRIM) 300 mg tablet. Take 1 tablet (300 mg total) by mouth daily.  Qty: 30    - furosemide (LASIX) 20 mg tablet. Take 1 tablet (20 mg total) by mouth daily.  Qty: 30    Pharmacy: [place], [place] - [address]     Does the patient have enough for 3 days?   [] Yes   [x] No - Send as HP to POD

## 2024-10-01 NOTE — PROGRESS NOTES
Hematology/Oncology Outpatient Follow-up  Murphy Willams 74 y.o. male 1950 [id number]    Date:  10/1/2024      Assessment and Plan:  Patient is 74-year-old male diagnosed with CLL in 2017, has been on observation September of this year was admitted to hospital for pneumonia acute hemolytic anemia.  Bone marrow with CLL/SLL, FISH  FISH with chromosome 17 and 13 deletion, T p53.  Patient not candidate for BTK inhibitors due to Afib.     1. CLL (chronic lymphocytic leukemia) (HCC)  2. Hepatosplenomegaly  Patient is s/p cycle 1 that was given on day 1, day 2 in hospital and day 8 and 15 post discharge, completed cycle 1 on 9/26 and scheduled for cycle 2 on 10/9.  Patient has venetoclax approved, discussed with pharmacy medicines will be delivered home, but patient was strongly encouraged not to start venetoclax until hospitalized due to high risk for tumor lysis syndrome but will need close monitoring.  There was also concerns for interaction with Pradaxa, verified on up to date and Web MD drug interaction calculator, no interactions found.  Per pharmacy Pradaxa and venetoclax has to be spaced out at least 6 hours apart.  Of note planning for small dose of venetoclax not the regular 400 mg on him. Continue allopurinol.   Will monitor CBC, reticulocyte count and LDH weekly.  Follow-up in 4 weeks.  He knows to call office if he develops any symptoms.    - CBC and differential; Standing  - Reticulocytes; Standing  - LD,Blood; Standing  - Comprehensive metabolic panel; Future      3. Paroxysmal A-fib (HCC)  On Pradaxa, recommended to take Pradaxa and venetoclax at least 6 hours apart to avoid interaction. Should be taking care even during hospitalization.    4. Autoimmune hemolytic anemia (HCC)  Found to have profound autoimmune hemolytic anemia with significant drop in hemoglobin in May 2021 when he was hospitalized due to COVID.  Treated with prednisone.    5. Chronic combined systolic and diastolic congestive heart  failure (HCC)  Follows with Cardiology.     6. BPH with obstruction/lower urinary tract symptoms  No symptoms at this time.     7. Pulmonary hypertension (HCC)  8. Pacemaker  Hx noted.      HPI:  Patient is a 74-year-old male diagnosed with CLL in 2017.  He was recently admitted to hospital and found to have profound anemia with no evidence of bleeding.  Workup remarkable for increased reticulocyte count and increased bilirubin that was primarily indirect, some evidence of hemolysis.  He received blood transfusions in the hospital.  Had bone marrow biopsy on 9/9/2024 that was remarkable for CLL, FISH with chromosome 17 and 13 deletion, T p53. Patient not good candidate for BTK inhibitor due to A-fib and was on Xarelto that is recently switched to Pradaxa.  Patient received day 1 and day 2 Gazyva in hospital, day 8 and 15 on 9/18/24 and 9/26/24 respectively. Cycle 2 scheduled on 10/9/24.  Venetoclax will be added to the regimen however will need to be hospitalized for 2 to 3 days to monitor for tumor lysis syndrome given high risk, and highly encouraged not to start venetoclax at home.  There was also concern for Pradaxa and venetoclax infection, checked on up-to-date no infection has been found.  Per pharmacy it has to be spaced out at least 6 hours, instruction to be made when he is in hospital.  Also not planning for full dose venetoclax, will be 200 or 100 mg.    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    Oncology History   CLL (chronic lymphocytic leukemia) ([facility])   9/29/2017 Initial Diagnosis    CLL (chronic lymphocytic leukemia) ([facility])     9/10/2024 -  Chemotherapy    alteplase (CATHFLO), 2 mg, Intracatheter, Every 1 Minute as needed, 1 of 6  cycles  obinutuzumab (GAZYVA) day 1 IVPB, 100 mg, Intravenous, Once, 1 of 1 cycle  Administration: 100 mg (9/11/2024)  obinutuzumab (GAZYVA) day 2 titrated infusion, 900 mg, Intravenous, Once, 1 of 1 cycle  Administration: 900 mg (9/11/2024)  obinutuzumab (GAZYVA) subsequent titrated infusion, 1,000 mg, Intravenous, Once, 1 of 6 cycles  Administration: 1,000 mg (9/18/2024)         Interval history:  Patient is here for follow-up of CLL, recently started on Gazyva.  Accompanied by wife.  He is frustrated on getting frequent lab work and need for Gazyva.  He also was confused about venetoclax as he heard from pharmacy regarding interaction.  Checked same on up-to-date, no interaction found.  Discussed with pharmacy recommendations are given patient is on Pradaxa need to space out both medications at least 6 hours which is manageable.  He will also not be started on full dose and planning to do either 100 or 200 mg of venetoclax.  Emphasized and encouraged repeatedly not to start venetoclax until he goes to hospital and is will need close monitoring for tumor lysis syndrome.    ROS: Review of Systems   Constitutional:  Positive for fatigue. Negative for chills, diaphoresis, fever and unexpected weight change.   Respiratory:  Negative for chest tightness and shortness of breath.    Cardiovascular:  Negative for chest pain and palpitations.   Gastrointestinal:  Negative for abdominal pain and blood in stool.   Musculoskeletal:  Positive for arthralgias. Negative for joint swelling.   Skin:  Positive for rash.   All other systems reviewed and are negative.      Past Medical History:   Diagnosis Date    Anxiety     BPH (benign prostatic hypertrophy)     Cancer (HCC)     CLL    CLL (chronic lymphocytic leukemia) (HCC)     2009    Colon polyp     Concussion     Resolved: 08/22/16    COVID-19 12/29/2023    Depression     Diverticulitis 01/13/2020 2014 CT done 11/19 12/19 CT done     E coli bacteremia 06/27/2021 2/2  blood cultures with Ecoli  Source appears to be the urine     Epididymitis 05/19/2021 5/2021    Gastrointestinal hemorrhage     Last assessed: 08/27/13    GERD (gastroesophageal reflux disease)     H/O vitamin D deficiency 07/07/2021    Hearing loss of aging     Hiatal hernia     History of right hip replacement 04/19/2021    History of transfusion     Hyperlipidemia     Hypertension     Hyponatremia 03/06/2023    Iron deficiency anemia     Livedo reticularis 09/02/2024    Microscopic hematuria     Last assessed: 06/28/13    OA (osteoarthritis)     right hip    Obesity (BMI 30.0-34.9) 04/07/2022    Pneumothorax     Primary osteoarthritis of right hip 09/29/2017    He is status post right hip arthroplasty    Prostatitis     Pulmonary hypertension ([facility])     QT prolongation     Seasonal allergies     Sepsis ([facility]) 03/06/2023    Unresponsive episode 03/07/2023    Urinary tract infection associated with catheterization of urinary tract  ([facility]) 02/05/2021    Pseudomonal UTI asymptomatic.  Per Urology do not treat at this time.    Urinary tract infection associated with catheterization of urinary tract  ([facility]) 02/05/2021    Pseudomonal UTI asymptomatic.  Per Urology do not treat at this time.  He did have urosepsis from E coli 7/21    UTI (urinary tract infection)     in past    Ventricular fibrillation ([facility]) 03/06/2023    Noted after unresponsive episode      Wears glasses        Past Surgical History:   Procedure Laterality Date    ADENOIDECTOMY      BLADDER SURGERY      CARDIAC CATHETERIZATION Left 03/07/2023    Procedure: Cardiac Left Heart Cath;  Surgeon: Rich Ramirez MD;  Location: [facility];  Service: Cardiology    CARDIAC CATHETERIZATION N/A 03/07/2023    Procedure: Cardiac temporary pacemaker;  Surgeon: Rich Ramirez MD;  Location: [facility];  Service: Cardiology    CARDIAC ELECTROPHYSIOLOGY PROCEDURE N/A 03/08/2023    Procedure: Cardiac icd implant;  Surgeon: Filiberto Olsen MD;  Location: [facility]  "CARDIAC CATH LAB;  Service: Cardiology    COLONOSCOPY      CYSTOSCOPY  10/09/2014    Diagnostic    CYSTOSCOPY  2020    FL CYSTOGRAM  08/15/2022    FRACTURE SURGERY      left lower arm    FRACTURE SURGERY      left femur    HERNIA REPAIR      IR BIOPSY BONE MARROW  2024    JOINT REPLACEMENT Right     hip    OTHER SURGICAL HISTORY  2011    Spinal anesthesia epidural    PA ARTHRP ACETBLR/PROX FEM PROSTC AGRFT/ALGRFT Right 2017    Procedure: ARTHROPLASTY HIP TOTAL ANTERIOR;  Surgeon: Roly Liu MD;  Location: [facility];  Service: Orthopedics    TONSILLECTOMY AND ADENOIDECTOMY      TRANSURETHRAL RESECTION OF PROSTATE      x 2    WRIST SURGERY         Social History     Socioeconomic History    Marital status: /Civil Union     Spouse name: None    Number of children: None    Years of education: None    Highest education level: None   Occupational History    None   Tobacco Use    Smoking status: Former     Current packs/day: 0.00     Types: Cigarettes     Quit date: 1980     Years since quittin.1     Passive exposure: Past    Smokeless tobacco: Never   Vaping Use    Vaping status: Never Used   Substance and Sexual Activity    Alcohol use: Yes     Alcohol/week: 10.0 standard drinks of alcohol     Types: 10 Cans of beer per week     Comment: socially    Drug use: Not Currently     Types: Marijuana     Comment: \"medical marijuana\"    Sexual activity: Not Currently   Other Topics Concern    None   Social History Narrative    None     Social Determinants of Health     Financial Resource Strain: Low Risk  (10/23/2023)    Overall Financial Resource Strain (CARDIA)     Difficulty of Paying Living Expenses: Not hard at all   Food Insecurity: No Food Insecurity (2024)    Hunger Vital Sign     Worried About Running Out of Food in the Last Year: Never true     Ran Out of Food in the Last Year: Never true   Transportation Needs: No Transportation Needs (2024)    PRAPARE - Transportation    "  Lack of Transportation (Medical): No     Lack of Transportation (Non-Medical): No   Physical Activity: Not on file   Stress: Not on file   Social Connections: Not on file   Intimate Partner Violence: Not on file   Housing Stability: Low Risk  (9/5/2024)    Housing Stability Vital Sign     Unable to Pay for Housing in the Last Year: No     Number of Times Moved in the Last Year: 0     Homeless in the Last Year: No       Family History   Problem Relation Age of Onset    No Known Problems Mother     Heart attack Father     Diabetes Brother     Prostate cancer Brother        Allergies   Allergen Reactions    Ciprofloxacin Other (See Comments)     LONG QT syndrome    Codeine Other (See Comments)     agitated    Sulfamethoxazole-Trimethoprim GI Intolerance, Abdominal Pain and Other (See Comments)     upset stomach    Augmentin [Amoxicillin-Pot Clavulanate] Rash     Livedo reticularis    Macrobid [Nitrofurantoin] Dizziness, Headache and Fatigue     Per Patient         Current Outpatient Medications:     acetaminophen (TYLENOL) 325 mg tablet, Take 2 tablets (650 mg total) by mouth every 6 (six) hours as needed for mild pain, Disp: , Rfl:     allopurinol (ZYLOPRIM) 300 mg tablet, Take 1 tablet (300 mg total) by mouth daily, Disp: 30 tablet, Rfl: 0    ascorbic acid (VITAMIN C) 250 MG CHEW, Chew 250 mg daily, Disp: , Rfl:     atorvastatin (LIPITOR) 20 mg tablet, Take 1 tablet (20 mg total) by mouth daily, Disp: 100 tablet, Rfl: 3    benzonatate (TESSALON) 200 MG capsule, Take 1 capsule (200 mg total) by mouth 3 (three) times a day as needed for cough, Disp: 30 capsule, Rfl: 1    Blood Pressure Monitoring (Omron 5 Series BP Monitor) PEE, , Disp: , Rfl:     dabigatran etexilate (PRADAXA) 150 mg capsu, Take 1 capsule (150 mg total) by mouth 2 (two) times a day, Disp: 180 capsule, Rfl: 2    furosemide (LASIX) 20 mg tablet, Take 1 tablet (20 mg total) by mouth daily, Disp: 30 tablet, Rfl: 0    Lactobacillus (PROBIOTIC ACIDOPHILUS  "PO), Take by mouth, Disp: , Rfl:     LORazepam (Ativan) 0.5 mg tablet, Take 1 tablet (0.5 mg total) by mouth daily as needed for anxiety, Disp: 60 tablet, Rfl: 1    montelukast (SINGULAIR) 10 mg tablet, TAKE ONE TABLET BY MOUTH DAILY, Disp: 90 tablet, Rfl: 3    nadolol (CORGARD) 80 MG tablet, Take 1 tablet (80 mg total) by mouth daily in the early morning, Disp: 90 tablet, Rfl: 3    Omega-3 Fatty Acids (FISH OIL) 1,000 mg, Take by mouth daily, Disp: , Rfl:     omeprazole (PriLOSEC) 40 MG capsule, Take one capsule by mouth once every day., Disp: 100 capsule, Rfl: 3    predniSONE 10 mg tablet, 4 for 4 days,2 for 4 days,1 for 4 days Do not start before September 19, 2024., Disp: 28 tablet, Rfl: 0    Sodium Fluoride 5000 PPM 1.1 % PSTE, USE A PEA-SIZED AMOUNT AND BRUSH FOR 2 MINUTES ONCE DAILY. NO FOOD OR RINSING FOR 30 MINUTES., Disp: , Rfl:     Venetoclax 10 & 50 & 100 MG TBPK, Will start in hospital  Take 20 mg by mouth daily for 7 days, then 50 mg daily for 7 days, then 100 mg daily for 7 days, then 200 mg daily thereafter, Disp: 42 each, Rfl: 0    Venetoclax 100 MG TABS, Take 2 tablets (200 mg total) by mouth daily, Disp: 60 tablet, Rfl: 5    zolpidem (AMBIEN) 5 mg tablet, Take 1/2-1 full tablet at bedtime as needed for sleep.  Do not take if drinking any alcohol or taking lorazepam, Disp: 90 tablet, Rfl: 0    vitamin B-12 (CYANOCOBALAMIN) 500 MCG TABS, Take 1 tablet (500 mcg total) by mouth daily, Disp: 30 tablet, Rfl: 0      Physical Exam:  /70 (BP Location: Left arm, Patient Position: Sitting, Cuff Size: Adult)   Pulse 88   Temp 97.8 °F (36.6 °C)   Resp 16   Ht 5' 8\" (1.727 m)   Wt 84.8 kg (187 lb)   SpO2 100%   BMI 28.43 kg/m²     Physical Exam  Constitutional:       Appearance: Normal appearance.   HENT:      Head: Normocephalic and atraumatic.      Mouth/Throat:      Mouth: Mucous membranes are moist.      Pharynx: Oropharynx is clear.   Cardiovascular:      Rate and Rhythm: Normal rate and " regular rhythm.   Pulmonary:      Effort: Pulmonary effort is normal.      Breath sounds: Normal breath sounds.   Abdominal:      General: Abdomen is flat. Bowel sounds are normal.   Skin:     Findings: Rash present.   Neurological:      General: No focal deficit present.      Mental Status: He is alert.         Labs:   CBC and differential  Order: [id number]   Status: Final result       Visible to patient: Yes (seen)       Next appt: 10/08/2024 at 10:00 AM in Dermatology ([facility])       Dx: CLL (chronic lymphocytic leukemia) (H...    0 Result Notes            Component  Ref Range & Units 9/24/24  7:25 AM 9/16/24  8:36 AM 9/12/24  5:15 AM 9/11/24  5:08 AM 9/10/24  8:14 AM 9/9/24  4:21 AM 9/8/24  8:51 AM   WBC  4.31 - 10.16 Thousand/uL 8.08 20.05 High  86.79 High  70.94 High  165.36 High  132.10 High  240.15 High    RBC  3.88 - 5.62 Million/uL 2.58 Low  2.49 Low  CM 2.02 Low  CM 2.60 Low  CM 2.65 Low  CM 2.13 Low  CM 2.05 Low  CM   Comment: Corrected for cold agglutinin   Hemoglobin  12.0 - 17.0 g/dL 9.0 Low  9.5 Low  6.7 Low  8.6 Low  8.7 Low  7.6 Low  6.5 Low  CM   Hematocrit  36.5 - 49.3 % 26.2 Low  26.0 Low  19.9 Low  26.5 Low  26.9 Low  22.5 Low  22.0 Low    MCV  82 - 98 fL 102 High  104 High  99 High  102 High  102 High  106 High  107 High    MCH  26.8 - 34.3 pg 34.9 High  38.2 High  33.2 33.1 32.8 35.7 High  31.7   MCHC  31.4 - 37.4 g/dL 34.4 36.5 33.7 32.5 32.3 33.8 29.5 Low    RDW  11.6 - 15.1 % 25.8 High  25.4 High  22.6 High  20.9 High  23.2 High      MPV  8.9 - 12.7 fL 9.9 10.5 9.9 9.9 10.0 10.2 10.5   Platelets  149 - 390 Thousands/uL 223 260 226 183 239 176 265   nRBC      0 R 0 R   Absolute Lymphocytes      >100.00 High  R    Absolute Monocytes      0.66 R    Eosinophils Absolute      0.00 R    Basophils Absolute      0.06 R    Segmented %      5 Low  R    Immature Grans %      0 R    Lymphocytes %      94 High  R    Monocytes %      1 Low  R    Eosinophils Relative      0 R    Basophils  Relative      0 R    Absolute Neutrophils      5.89 R    Absolute Immature Grans      0.27 High  R                    Comprehensive metabolic panel  Order: [id number]   Status: Final result       Visible to patient: Yes (seen)       Next appt: 10/08/2024 at 10:00 AM in Dermatology ([facility])       Dx: CLL (chronic lymphocytic leukemia) (HCC)    0 Result Notes            Component  Ref Range & Units 9/24/24  7:25 AM 9/16/24  8:36 AM 9/12/24  5:15 AM 9/11/24  5:08 AM 9/10/24  4:47 AM 9/9/24  4:21 AM 9/8/24  4:55 AM   Sodium  135 - 147 mmol/L 134 Low  131 Low  133 Low  132 Low  132 Low  133 Low  133 Low    Potassium  3.5 - 5.3 mmol/L 4.2 4.3 4.2 4.2 4.1 4.7 4.6   Chloride  96 - 108 mmol/L 97 96 99 99 98 97 99   CO2  21 - 32 mmol/L 31 27 29 27 29 31 29   ANION GAP  4 - 13 mmol/L 6 8 5 6 5 5 5   BUN  5 - 25 mg/dL 12 14 16 20 21 20 22   Creatinine  0.60 - 1.30 mg/dL 0.73 0.65 CM 0.65 CM 0.64 CM 0.63 CM 0.72 CM 0.77 CM   Comment: Standardized to IDMS reference method   Glucose, Fasting  65 - 99 mg/dL 117 High  111 High         Calcium  8.4 - 10.2 mg/dL 9.3 8.9 7.9 Low  7.9 Low  7.8 Low  8.1 Low  7.9 Low    AST  13 - 39 U/L 20 34        ALT  7 - 52 U/L 38 72 High  CM        Comment: Specimen collection should occur prior to Sulfasalazine administration due to the potential for falsely depressed results.   Alkaline Phosphatase  34 - 104 U/L 98 99        Total Protein  6.4 - 8.4 g/dL 7.1 7.2        Albumin  3.5 - 5.0 g/dL 4.2 4.0        Total Bilirubin  0.20 - 1.00 mg/dL 2.14 High  3.01 High  CM        Comment: Use of this assay is not recommended for patients undergoing treatment with eltrombopag due to the potential for falsely elevated results.  N-acetyl-p-benzoquinone imine (metabolite of Acetaminophen) will generate erroneously low results in samples for patients that have taken an overdose of Acetaminophen.   eGFR  ml/min/1.73sq m 92 97 97 98 99 93 91              [facility]    National Kidney Disease  Foundation guidelines for Chronic Kidney Disease (CKD):    Stage 1 with normal or high GFR (GFR > 90 mL/min/1.73 square meters)    Stage 2 Mild CKD (GFR = 60-89 mL/min/1.73 square meters)    Stage 3A Moderate CKD (GFR = 45-59 mL/min/1.73 square meters)    Stage 3B Moderate CKD (GFR = 30-44 mL/min/1.73 square meters)    Stage 4 Severe CKD (GFR = 15-29 mL/min/1.73 square meters)    Stage 5 End Stage CKD (GFR <15 mL/min/1.73 square meters)  Note: GFR calculation is accurate only with a steady state creatinine      Specimen Collected: 09/24/24  7:25 AM Last Resulted: 09/24/24  8:35 AM       View All Conversations on this Encounter        CM=Additional comments        Related Results     Uric acid Final result 9/24/2024                     LD,Blood Final result 9/24/2024                     Bilirubin, direct Final result 9/24/2024                                       Patient voiced understanding and agreement in the above discussion. Aware to contact our office with questions/symptoms in the interim.     This note has been generated by voice recognition software system.  Therefore, there may be spelling, grammar, and or syntax errors. Please contact if questions arise.

## 2024-10-02 ENCOUNTER — TELEPHONE (OUTPATIENT)
Age: 74
End: 2024-10-02

## 2024-10-02 DIAGNOSIS — C91.10 CLL (CHRONIC LYMPHOCYTIC LEUKEMIA) (HCC): ICD-10-CM

## 2024-10-02 RX ORDER — ALLOPURINOL 300 MG/1
300 TABLET ORAL DAILY
Qty: 90 TABLET | Refills: 1 | Status: SHIPPED | OUTPATIENT
Start: 2024-10-02

## 2024-10-02 NOTE — TELEPHONE ENCOUNTER
Patient contacted the office this morning with a couple of questions/concerns. States that he was seeing Dr. Coleman Hawthorne who has since left Dermatology. Completed multiple labs 09/24/24 for her. He stated that he received a phone call yesterday from someone within [facility] that he should have genetic lab testing completed as well and he is asking if pcp feels it is necessary at this point. His main concern is his CLL at this time. States that he did review his lab results, and found online with research because he is on blood thinners this can result in inaccurate (false negative reports). Requesting if he can receive a call back with pcp response. (Would prefer if Pranay could contact him back) I did relay I would forward information at this time. Patient very appreciative and thankful over phone.

## 2024-10-02 NOTE — TELEPHONE ENCOUNTER
Reason for call:   [x] Refill   [] Prior Auth  [x] Other: medication was given in the hospital- sent to cardiologist and they are stating that PCP should fell medication.     Office:   [x] PCP/Provider - [place]  [] Specialty/Provider -     Medication: allopurinol (ZYLOPRIM) 300 mg tablet     Dose/Frequency: Take 1 tablet (300 mg total) by mouth daily     Quantity: 30 tablet     Pharmacy: [facility] [place] [place] - [facility] [address]     Does the patient have enough for 3 days?   [] Yes   [x] No - Send as HP to POD

## 2024-10-03 NOTE — TELEPHONE ENCOUNTER
I reached out to the patient via [employer].  I did send off a request to the chair of dermatology to review his labs in the context of his current rash.

## 2024-10-05 NOTE — RESULT ENCOUNTER NOTE
DERMATOPATHOLOGY RESULT NOTE    Results reviewed by ordering physician.  Called patient to personally discuss results. Discussed results with patient.     Instructions for Clinical Derm Team:   (remember to route Result Note to appropriate staff):    None    Result & Plan by Specimen:    Specimen A: consistent with livedo reticularis   Plan: Extensively discussed with patient that LR can be due to several underlying conditions including vasculopathy, vasculitis, coagulopathy, autoimmune connective tissue disorders, medications (most common been amantadine), blood malignancies. LR can be seen in Raynaud phenomenon, chilblains, acrocyanosis and in individuals with poor vascular flow. Discussed that other antibiotics such as Bactrim are known to cause LR however reviewing the literature there was a paucity of evidence that Augmentin causes LR. Discussed that given history of CLL which is currently active and the fact that blood malignancies can be the etiology of LR it is likely his LR is due to his underlying CLL.   Discussed that been that he's on an anticoagulation therapy it is difficult to ascertain the veracity of his blood coagulation studies as anticoagulants can interfere with the results of such tests. Patient does not understand why we ordered those tests given the fact that he's been cared for by a hematologist and thinks his hematologist should have been involved in making that decision. He reports that one of the coagulation studies is very expensive and he wouldn't do it. Discussed other tests to rule out other etiologies including GHASSAN, ANCA, paraproteins, cryoglobulins, however patient doesn't want to have any more tests with regards to his rash. He reports rash is not constant but rather intermittent. He has noticed that it is more prominent after he takes showers which is usually with warm water. He denies associated symptoms including pruritus, tenderness, tingling and or burning sensation.  Advised  to reach out to us if rash spreads to involve other areas of the skin, becomes symptomatic.       Final Diagnosis  A. Skin, left proximal thigh, punch biopsy:    Vascular ectasia and sparse superficial perivascular/periadnexal lymphocytic infiltrate (see note).    Note: The histopathologic findings are non-specific; in the appropriate clinical context, the histopathologic findings are compatible with livedo reticularis. Clinical pathologic correlation is advised. Multiple levels examined. Pathogenic microorganisms are not seen with PAS stain. If the rash were to progress/persist despite therapy, additional sampling should be sought to exclude development of a more significant disease.    Off Note: Office visit and biopsy done by Dr. Hawthorne.     B. Skin, left distal thigh, immunofluorescence:     IgG: negative  IgM: negative  IgA: negative  C3: negative  Fibrinogen: negative      Impression: The direct immunofluorescence findings are negative/non-diagnostic.      Comments:  This is an appended report. These results have been appended to a previously preliminary verified report.    Electronically signed by Franca Kamara MD on 9/29/2024 at  6:25 PM     0

## 2024-10-07 ENCOUNTER — HOSPITAL ENCOUNTER (OUTPATIENT)
Dept: RADIOLOGY | Facility: HOSPITAL | Age: 74
Discharge: HOME | End: 2024-10-07
Payer: MEDICARE

## 2024-10-07 ENCOUNTER — APPOINTMENT (OUTPATIENT)
Dept: LAB | Facility: HOSPITAL | Age: 74
End: 2024-10-07
Payer: MEDICARE

## 2024-10-07 DIAGNOSIS — D64.9 ANEMIA, UNSPECIFIED TYPE: ICD-10-CM

## 2024-10-07 DIAGNOSIS — J18.9 PNEUMONIA OF RIGHT MIDDLE LOBE DUE TO INFECTIOUS ORGANISM: ICD-10-CM

## 2024-10-07 DIAGNOSIS — C91.10 CLL (CHRONIC LYMPHOCYTIC LEUKEMIA) (HCC): ICD-10-CM

## 2024-10-07 LAB
ALBUMIN SERPL BCG-MCNC: 4.4 G/DL (ref 3.5–5)
ALP SERPL-CCNC: 78 U/L (ref 34–104)
ALT SERPL W P-5'-P-CCNC: 18 U/L (ref 7–52)
ANION GAP SERPL CALCULATED.3IONS-SCNC: 5 MMOL/L (ref 4–13)
ANISOCYTOSIS BLD QL SMEAR: PRESENT
AST SERPL W P-5'-P-CCNC: 18 U/L (ref 13–39)
BASOPHILS # BLD MANUAL: 0 THOUSAND/UL (ref 0–0.1)
BASOPHILS NFR MAR MANUAL: 0 % (ref 0–1)
BILIRUB SERPL-MCNC: 1.81 MG/DL (ref 0.2–1)
BUN SERPL-MCNC: 14 MG/DL (ref 5–25)
CALCIUM SERPL-MCNC: 9.5 MG/DL (ref 8.4–10.2)
CHLORIDE SERPL-SCNC: 100 MMOL/L (ref 96–108)
CO2 SERPL-SCNC: 30 MMOL/L (ref 21–32)
CREAT SERPL-MCNC: 0.78 MG/DL (ref 0.6–1.3)
EOSINOPHIL # BLD MANUAL: 0 THOUSAND/UL (ref 0–0.4)
EOSINOPHIL NFR BLD MANUAL: 0 % (ref 0–6)
GFR SERPL CREATININE-BSD FRML MDRD: 90 ML/MIN/1.73SQ M
GLUCOSE P FAST SERPL-MCNC: 115 MG/DL (ref 65–99)
HCT VFR BLD AUTO: 26.6 % (ref 36.5–49.3)
HGB BLD-MCNC: 9.4 G/DL (ref 12–17)
LDH SERPL-CCNC: 237 U/L (ref 140–271)
LYMPHOCYTES # BLD AUTO: 2.12 THOUSAND/UL (ref 0.6–4.47)
LYMPHOCYTES # BLD AUTO: 27 % (ref 14–44)
MACROCYTES BLD QL AUTO: PRESENT
MCH RBC QN AUTO: 38.8 PG (ref 26.8–34.3)
MCHC RBC AUTO-ENTMCNC: 35.3 G/DL (ref 31.4–37.4)
MCV RBC AUTO: 110 FL (ref 82–98)
METAMYELOCYTE ABSOLUTE CT: 0.08 THOUSAND/UL (ref 0–0.1)
METAMYELOCYTES NFR BLD MANUAL: 1 % (ref 0–1)
MONOCYTES # BLD AUTO: 0.39 THOUSAND/UL (ref 0–1.22)
MONOCYTES NFR BLD: 5 % (ref 4–12)
NEUTROPHILS # BLD MANUAL: 5.26 THOUSAND/UL (ref 1.85–7.62)
NEUTS BAND NFR BLD MANUAL: 4 % (ref 0–8)
NEUTS SEG NFR BLD AUTO: 63 % (ref 43–75)
PLATELET # BLD AUTO: 284 THOUSANDS/UL (ref 149–390)
PLATELET BLD QL SMEAR: ADEQUATE
PMV BLD AUTO: 9.4 FL (ref 8.9–12.7)
POLYCHROMASIA BLD QL SMEAR: PRESENT
POTASSIUM SERPL-SCNC: 4.6 MMOL/L (ref 3.5–5.3)
PROT SERPL-MCNC: 7.2 G/DL (ref 6.4–8.4)
RBC # BLD AUTO: 2.42 MILLION/UL (ref 3.88–5.62)
RBC MORPH BLD: PRESENT
RETICS # AUTO: ABNORMAL 10*3/UL (ref 14356–105094)
RETICS # CALC: 11.2 % (ref 0.37–1.87)
SODIUM SERPL-SCNC: 135 MMOL/L (ref 135–147)
URATE SERPL-MCNC: 5.1 MG/DL (ref 3.5–8.5)
WBC # BLD AUTO: 7.85 THOUSAND/UL (ref 4.31–10.16)

## 2024-10-07 PROCEDURE — 83615 LACTATE (LD) (LDH) ENZYME: CPT

## 2024-10-07 PROCEDURE — 71046 X-RAY EXAM CHEST 2 VIEWS: CPT

## 2024-10-07 PROCEDURE — 80053 COMPREHEN METABOLIC PANEL: CPT

## 2024-10-07 PROCEDURE — 85045 AUTOMATED RETICULOCYTE COUNT: CPT

## 2024-10-07 PROCEDURE — 84550 ASSAY OF BLOOD/URIC ACID: CPT

## 2024-10-07 PROCEDURE — 85007 BL SMEAR W/DIFF WBC COUNT: CPT

## 2024-10-07 PROCEDURE — 85027 COMPLETE CBC AUTOMATED: CPT

## 2024-10-08 ENCOUNTER — PREP FOR PROCEDURE (OUTPATIENT)
Dept: PULMONOLOGY | Facility: CLINIC | Age: 74
End: 2024-10-08

## 2024-10-08 ENCOUNTER — OFFICE VISIT (OUTPATIENT)
Dept: PULMONOLOGY | Facility: CLINIC | Age: 74
End: 2024-10-08
Payer: MEDICARE

## 2024-10-08 ENCOUNTER — TELEPHONE (OUTPATIENT)
Age: 74
End: 2024-10-08

## 2024-10-08 ENCOUNTER — CLINICAL SUPPORT (OUTPATIENT)
Dept: DERMATOLOGY | Facility: CLINIC | Age: 74
End: 2024-10-08

## 2024-10-08 VITALS
HEIGHT: 68 IN | HEART RATE: 92 BPM | BODY MASS INDEX: 28.95 KG/M2 | OXYGEN SATURATION: 97 % | WEIGHT: 191 LBS | DIASTOLIC BLOOD PRESSURE: 70 MMHG | SYSTOLIC BLOOD PRESSURE: 130 MMHG | TEMPERATURE: 99.3 F

## 2024-10-08 DIAGNOSIS — Z48.02 ENCOUNTER FOR REMOVAL OF SUTURES: Primary | ICD-10-CM

## 2024-10-08 DIAGNOSIS — J18.9 PNEUMONIA OF RIGHT MIDDLE LOBE DUE TO INFECTIOUS ORGANISM: Primary | ICD-10-CM

## 2024-10-08 DIAGNOSIS — I27.20 PULMONARY HYPERTENSION (HCC): Chronic | ICD-10-CM

## 2024-10-08 PROBLEM — Z78.9 ALCOHOL USE: Status: RESOLVED | Noted: 2022-09-27 | Resolved: 2024-10-08

## 2024-10-08 PROBLEM — F10.90 ALCOHOL USE: Status: RESOLVED | Noted: 2022-09-27 | Resolved: 2024-10-08

## 2024-10-08 PROCEDURE — 99214 OFFICE O/P EST MOD 30 MIN: CPT | Performed by: INTERNAL MEDICINE

## 2024-10-08 PROCEDURE — G2211 COMPLEX E/M VISIT ADD ON: HCPCS | Performed by: INTERNAL MEDICINE

## 2024-10-08 PROCEDURE — RECHECK: Performed by: REGISTERED NURSE

## 2024-10-08 RX ORDER — SODIUM CHLORIDE 9 MG/ML
20 INJECTION, SOLUTION INTRAVENOUS ONCE
Status: CANCELLED | OUTPATIENT
Start: 2024-10-23

## 2024-10-08 RX ORDER — ACETAMINOPHEN 325 MG/1
650 TABLET ORAL ONCE
Status: CANCELLED | OUTPATIENT
Start: 2024-10-23

## 2024-10-08 RX ORDER — BENZONATATE 200 MG/1
200 CAPSULE ORAL 3 TIMES DAILY PRN
Qty: 30 CAPSULE | Refills: 1 | Status: SHIPPED | OUTPATIENT
Start: 2024-10-08

## 2024-10-08 NOTE — ASSESSMENT & PLAN NOTE
Recent inpatient echocardiogram suggests pulmonary hypertension pressures again close to 70  These pressures have varied between mid 30s and up to 75.  He has never had symptoms of pulmonary hypertension.  No overt signs of cor pulmonale with abdominal bloating, leg swelling, or other signs of decompensated right heart failure.  Continue observation at this point.  This will be deferred until after bronchoscopy and additional workup.  May eventually benefit from repeat echocardiogram and possible right heart catheterization.

## 2024-10-08 NOTE — PROGRESS NOTES
Progress note - Pulmonary Medicine   Murphy Willams 74 y.o. male MRN: [id number]       Impression & Plan:     Pneumonia of right middle lobe due to infectious organism  Nonresolving pneumonia by imaging  September CAT scan and now most recent follow-up x-ray show persistence of infiltrate in the right middle lobe  I have advised that we proceed with bronchoscopy.  I would anticipate lavages and likely brushes.  Given his pulmonary hypertension, Pradaxa use, and CLL with significant anemia more recently, would likely avoid transbronchial biopsy  At this point, would still favor inflammatory/infectious process.  Malignancy however is not excluded.  This was discussed with the patient and his wife today.  They are agreeable to proceeding with bronchoscopy which has been scheduled for Tuesday 10/15    Pulmonary hypertension (HCC)  Recent inpatient echocardiogram suggests pulmonary hypertension pressures again close to 70  These pressures have varied between mid 30s and up to 75.  He has never had symptoms of pulmonary hypertension.  No overt signs of cor pulmonale with abdominal bloating, leg swelling, or other signs of decompensated right heart failure.  Continue observation at this point.  This will be deferred until after bronchoscopy and additional workup.  May eventually benefit from repeat echocardiogram and possible right heart catheterization.    Return for follow-up will be based on testing.    ______________________________________________________________________    HPI:    Murphy Willams presents today for follow-up of abnormal chest x-ray and pneumonia.  He currently is relatively asymptomatic aside from a lingering dry cough.  After the last visit with me he did was not feeling well and went to the hospital.  He was found to be severely anemic.  At this time however this did not appear to be hemolytic anemia.  He was seen by heme-onc and was felt to have more of a process related to his underlying CLL.   His medications have been adjusted.  His white count has improved.  He was transfused and his hemoglobin is now in the mid 9 range.    He did have an echocardiogram during the hospitalization which showed pulmonary hypertension again.  This however is discordant with his symptomatology.  He is not reporting shortness of breath with exertion.  No leg swelling, abdominal bloating, or other signs or symptoms of cor pulmonale.  He is on anticoagulation and has no signs or symptoms to suggest pulmonary emboli.    No fever or chills.  Cough is not productive.  No appetite change or weight change.  Denies chest pain or cardiac complaints.  He is anxious about his medical conditions.  He has also had some issues with constipation.    Current Medications:    Current Outpatient Medications:     acetaminophen (TYLENOL) 325 mg tablet, Take 2 tablets (650 mg total) by mouth every 6 (six) hours as needed for mild pain, Disp: , Rfl:     allopurinol (ZYLOPRIM) 300 mg tablet, Take 1 tablet (300 mg total) by mouth daily, Disp: 90 tablet, Rfl: 1    ascorbic acid (VITAMIN C) 250 MG CHEW, Chew 250 mg daily, Disp: , Rfl:     atorvastatin (LIPITOR) 20 mg tablet, Take 1 tablet (20 mg total) by mouth daily, Disp: 100 tablet, Rfl: 3    benzonatate (TESSALON) 200 MG capsule, Take 1 capsule (200 mg total) by mouth 3 (three) times a day as needed for cough, Disp: 30 capsule, Rfl: 1    Blood Pressure Monitoring (Omron 5 Series BP Monitor) PEE, , Disp: , Rfl:     dabigatran etexilate (PRADAXA) 150 mg capsu, Take 1 capsule (150 mg total) by mouth 2 (two) times a day, Disp: 180 capsule, Rfl: 2    furosemide (LASIX) 20 mg tablet, Take 1 tablet (20 mg total) by mouth daily, Disp: 90 tablet, Rfl: 5    Lactobacillus (PROBIOTIC ACIDOPHILUS PO), Take by mouth, Disp: , Rfl:     LORazepam (Ativan) 0.5 mg tablet, Take 1 tablet (0.5 mg total) by mouth daily as needed for anxiety, Disp: 60 tablet, Rfl: 1    montelukast (SINGULAIR) 10 mg tablet, TAKE ONE  "TABLET BY MOUTH DAILY, Disp: 90 tablet, Rfl: 3    nadolol (CORGARD) 80 MG tablet, Take 1 tablet (80 mg total) by mouth daily in the early morning, Disp: 90 tablet, Rfl: 3    Omega-3 Fatty Acids (FISH OIL) 1,000 mg, Take by mouth daily, Disp: , Rfl:     omeprazole (PriLOSEC) 40 MG capsule, Take one capsule by mouth once every day., Disp: 100 capsule, Rfl: 3    predniSONE 10 mg tablet, 4 for 4 days,2 for 4 days,1 for 4 days Do not start before 2024., Disp: 28 tablet, Rfl: 0    Sodium Fluoride 5000 PPM 1.1 % PSTE, USE A PEA-SIZED AMOUNT AND BRUSH FOR 2 MINUTES ONCE DAILY. NO FOOD OR RINSING FOR 30 MINUTES., Disp: , Rfl:     Venetoclax 10 & 50 & 100 MG TBPK, Will start in hospital  Take 20 mg by mouth daily for 7 days, then 50 mg daily for 7 days, then 100 mg daily for 7 days, then 200 mg daily thereafter, Disp: 42 each, Rfl: 0    Venetoclax 100 MG TABS, Take 2 tablets (200 mg total) by mouth daily, Disp: 60 tablet, Rfl: 5    zolpidem (AMBIEN) 5 mg tablet, Take 1/2-1 full tablet at bedtime as needed for sleep.  Do not take if drinking any alcohol or taking lorazepam, Disp: 90 tablet, Rfl: 0    vitamin B-12 (CYANOCOBALAMIN) 500 MCG TABS, Take 1 tablet (500 mcg total) by mouth daily, Disp: 30 tablet, Rfl: 0    Review of Systems:    Aside from what is mentioned in the HPI, the review of systems is otherwise negative    Past medical history, surgical history, and family history were reviewed and updated as appropriate    Social history updates:  Social History     Tobacco Use   Smoking Status Former    Current packs/day: 0.00    Types: Cigarettes    Quit date: 1980    Years since quittin.1    Passive exposure: Past   Smokeless Tobacco Never       PhysicalExamination:  Vitals:   /70 (BP Location: Left arm, Patient Position: Sitting, Cuff Size: Large)   Pulse 92   Temp 99.3 °F (37.4 °C) (Tympanic)   Ht 5' 8\" (1.727 m)   Wt 86.6 kg (191 lb)   SpO2 97%   BMI 29.04 kg/m²     Gen: Mild pallor.  " Comfortable on room air.  No conversational dyspnea  HEENT:  Conjugate gaze.  sclerae anicteric.  Oropharynx moist  Neck: Trachea is midline. No JVD. No adenopathy  Chest: Excursion is symmetric.  Lung fields are clear.  No wheezes or crackles  Cardiac: Regular, no murmur appreciated  Abdomen:  benign  Extremities: No edema  Neuro:  Normal speech and mentation    Diagnostic Data:  Labs:  I personally reviewed the most recent laboratory data pertinent to today's visit    Lab Results   Component Value Date    WBC 7.85 10/07/2024    HGB 9.4 (L) 10/07/2024    HCT 26.6 (L) 10/07/2024     (H) 10/07/2024     10/07/2024     Lab Results   Component Value Date    SODIUM 135 10/07/2024    K 4.6 10/07/2024    CO2 30 10/07/2024     10/07/2024    BUN 14 10/07/2024    CREATININE 0.78 10/07/2024    GLUCOSE 130 01/10/2021    CALCIUM 9.5 10/07/2024       Imaging:  I personally reviewed the images on the PAC system pertinent to today's visit  Reviewed chest x-ray from this week which shows persistent right middle lobe infiltrate.  CAT scan from hospitalization in September was also reviewed and does show posterior right middle lobe peribronchovascular infiltrate with some peripheral reticulation.    Darrell Torrez MD

## 2024-10-08 NOTE — TELEPHONE ENCOUNTER
Call back    Patient would like a call back from  [facility] at [facility] [place].    Please call [phone]

## 2024-10-08 NOTE — ASSESSMENT & PLAN NOTE
Nonresolving pneumonia by imaging  September CAT scan and now most recent follow-up x-ray show persistence of infiltrate in the right middle lobe  I have advised that we proceed with bronchoscopy.  I would anticipate lavages and likely brushes.  Given his pulmonary hypertension, Pradaxa use, and CLL with significant anemia more recently, would likely avoid transbronchial biopsy  At this point, would still favor inflammatory/infectious process.  Malignancy however is not excluded.  This was discussed with the patient and his wife today.  They are agreeable to proceeding with bronchoscopy which has been scheduled for Tuesday 10/15

## 2024-10-08 NOTE — PROGRESS NOTES
"Suture removal    Date/Time: 10/8/2024 10:00 AM    Performed by: Latoya Potter RN  Authorized by: Minor Martin MD  [place] Protocol:  procedure performed by consultantConsent: Verbal consent obtained. Written consent not obtained.  Risks and benefits: risks, benefits and alternatives were discussed  Consent given by: patient  Time out: Immediately prior to procedure a \"time out\" was called to verify the correct patient, procedure, equipment, support staff and site/side marked as required.  Timeout called at: 10/8/2024 10:01 AM.  Patient understanding: patient states understanding of the procedure being performed  Patient consent: the patient's understanding of the procedure matches consent given  Procedure consent: procedure consent matches procedure scheduled  Relevant documents: relevant documents not present or verified  Test results: test results not available  Site marked: the operative site was not marked  Radiology Images displayed and confirmed. If images not available, report reviewed: imaging studies not available  Patient identity confirmed: verbally with patient      Patient location:  Clinic  Location:     Laterality:  Left    Location:  Lower extremity    Lower extremity location: proximal thigh.  Procedure details:     Tools used:  Suture removal kit    Wound appearance:  No sign(s) of infection, good wound healing, clean, moist and pink    Number of sutures removed:  1  Post-procedure details:     Post-removal:  Band-Aid applied (Vaseline applied)    Patient tolerance of procedure:  Tolerated well, no immediate complications  Comments:      Patient advised to use Vaseline and a band aid for 1-2 more weeks to aid in the wound healing process. Patient educated on signs of infection to monitor for such as increased pain, redness, drainage, fever or chills.     Suture removal    Date/Time: 10/8/2024 10:00 AM    Performed by: Latoya Potter RN  Authorized by: MD Cliff Oliveros " "Protocol:  procedure performed by consultantConsent: Verbal consent obtained. Written consent not obtained.  Risks and benefits: risks, benefits and alternatives were discussed  Consent given by: patient  Time out: Immediately prior to procedure a \"time out\" was called to verify the correct patient, procedure, equipment, support staff and site/side marked as required.  Timeout called at: 10/8/2024 10:02 AM.  Patient understanding: patient states understanding of the procedure being performed  Patient consent: the patient's understanding of the procedure matches consent given  Procedure consent: procedure consent matches procedure scheduled  Relevant documents: relevant documents not present or verified  Test results: test results not available  Site marked: the operative site was not marked  Radiology Images displayed and confirmed. If images not available, report reviewed: imaging studies not available  Patient identity confirmed: verbally with patient      Patient location:  Clinic  Location:     Laterality:  Left    Location:  Lower extremity    Lower extremity location: distal thigh.  Procedure details:     Tools used:  Suture removal kit    Wound appearance:  No sign(s) of infection, good wound healing, clean, pink and moist    Number of sutures removed:  1  Post-procedure details:     Post-removal:  Band-Aid applied (Vaseline applied)    Patient tolerance of procedure:  Tolerated well, no immediate complications  Comments:      Patient advised to use Vaseline and a band aid for 1-2 more weeks to aid in the wound healing process. Patient educated on signs of infection to monitor for such as increased pain, redness, drainage, fever or chills.         Before suture removal     After suture removal   "

## 2024-10-09 ENCOUNTER — HOSPITAL ENCOUNTER (OUTPATIENT)
Dept: INFUSION CENTER | Facility: CLINIC | Age: 74
Discharge: HOME | End: 2024-10-09

## 2024-10-09 ENCOUNTER — TELEPHONE (OUTPATIENT)
Dept: PULMONOLOGY | Facility: CLINIC | Age: 74
End: 2024-10-09

## 2024-10-09 VITALS — BODY MASS INDEX: 29.05 KG/M2 | HEIGHT: 68 IN

## 2024-10-09 DIAGNOSIS — C91.10 CLL (CHRONIC LYMPHOCYTIC LEUKEMIA) (HCC): Primary | ICD-10-CM

## 2024-10-09 DIAGNOSIS — C91.10 CLL (CHRONIC LYMPHOCYTIC LEUKEMIA) (HCC): ICD-10-CM

## 2024-10-09 NOTE — TELEPHONE ENCOUNTER
Dr. LIGHT will be performing a BRONCHOSCOPY on Murphy Willams on 10/15/2024     LOCATION: [facility]    ANTICOAGULATION INSTRUCTIONS: PRADAXA HOLD FOR 48 HOURS    OTHER MEDICATION INSTRUCTIONS: NONE    LABS: (CBC/PT/PTT in last 90 days): 10/07/2024   (If no, labs ordered / to be done at least one day prior to procedure)    LAST OFFICE NOTE/H&P within 30 days of procedure: 10/08/2024    INSTRUCTIONS GIVEN: Spoke with patient advised of procedure date and location. Patient aware he is to be NPO after midnight the night prior and will need a . Patient advised he will receive a call the day before with time of arrival.      Thank You     Anu Selby

## 2024-10-09 NOTE — PROGRESS NOTES
Pt. Arrived to infusion offering no complaints.  Pneumonia of Right middle lobe noted per Pulmonary note with Bronchoscopy ordered 10/15/24.  Per Dr. Hanley, will defer 2 weeks. Explanation given to patient. Pt. Verbalized understanding. Future appointments scheduled. AVS declined.

## 2024-10-14 ENCOUNTER — APPOINTMENT (OUTPATIENT)
Age: 74
End: 2024-10-14
Payer: MEDICARE

## 2024-10-14 DIAGNOSIS — R73.9 HYPERGLYCEMIA: ICD-10-CM

## 2024-10-14 DIAGNOSIS — F33.9 DEPRESSION, RECURRENT (HCC): ICD-10-CM

## 2024-10-14 DIAGNOSIS — I10 ESSENTIAL HYPERTENSION: Chronic | ICD-10-CM

## 2024-10-14 DIAGNOSIS — D64.9 ANEMIA, UNSPECIFIED TYPE: ICD-10-CM

## 2024-10-14 DIAGNOSIS — C91.10 CLL (CHRONIC LYMPHOCYTIC LEUKEMIA) (HCC): ICD-10-CM

## 2024-10-14 DIAGNOSIS — E87.1 HYPONATREMIA: ICD-10-CM

## 2024-10-14 DIAGNOSIS — Z12.5 PROSTATE CANCER SCREENING: ICD-10-CM

## 2024-10-14 DIAGNOSIS — I48.0 PAROXYSMAL A-FIB (HCC): ICD-10-CM

## 2024-10-14 DIAGNOSIS — E78.00 HYPERCHOLESTEROLEMIA: Chronic | ICD-10-CM

## 2024-10-14 DIAGNOSIS — I27.20 PULMONARY HYPERTENSION (HCC): Chronic | ICD-10-CM

## 2024-10-14 DIAGNOSIS — I45.81 LONG QT SYNDROME TYPE 2: ICD-10-CM

## 2024-10-14 DIAGNOSIS — D59.10 AUTOIMMUNE HEMOLYTIC ANEMIA (HCC): ICD-10-CM

## 2024-10-14 LAB
ALBUMIN SERPL BCG-MCNC: 4.2 G/DL (ref 3.5–5)
ALP SERPL-CCNC: 78 U/L (ref 34–104)
ALT SERPL W P-5'-P-CCNC: 16 U/L (ref 7–52)
ANION GAP SERPL CALCULATED.3IONS-SCNC: 9 MMOL/L (ref 4–13)
AST SERPL W P-5'-P-CCNC: 16 U/L (ref 13–39)
BASOPHILS # BLD AUTO: 0.04 THOUSANDS/ΜL (ref 0–0.1)
BASOPHILS NFR BLD AUTO: 1 % (ref 0–1)
BILIRUB SERPL-MCNC: 1.39 MG/DL (ref 0.2–1)
BUN SERPL-MCNC: 12 MG/DL (ref 5–25)
CALCIUM SERPL-MCNC: 9.1 MG/DL (ref 8.4–10.2)
CHLORIDE SERPL-SCNC: 100 MMOL/L (ref 96–108)
CO2 SERPL-SCNC: 29 MMOL/L (ref 21–32)
CREAT SERPL-MCNC: 0.74 MG/DL (ref 0.6–1.3)
EOSINOPHIL # BLD AUTO: 0.03 THOUSAND/ΜL (ref 0–0.61)
EOSINOPHIL NFR BLD AUTO: 1 % (ref 0–6)
ERYTHROCYTE [DISTWIDTH] IN BLOOD BY AUTOMATED COUNT: 24.9 % (ref 11.6–15.1)
GFR SERPL CREATININE-BSD FRML MDRD: 92 ML/MIN/1.73SQ M
GLUCOSE P FAST SERPL-MCNC: 107 MG/DL (ref 65–99)
HCT VFR BLD AUTO: 25.6 % (ref 36.5–49.3)
HGB BLD-MCNC: 9.2 G/DL (ref 12–17)
IMM GRANULOCYTES # BLD AUTO: 0.09 THOUSAND/UL (ref 0–0.2)
IMM GRANULOCYTES NFR BLD AUTO: 2 % (ref 0–2)
LDH SERPL-CCNC: 256 U/L (ref 140–271)
LYMPHOCYTES # BLD AUTO: 1.37 THOUSANDS/ΜL (ref 0.6–4.47)
LYMPHOCYTES NFR BLD AUTO: 23 % (ref 14–44)
MCH RBC QN AUTO: 39.7 PG (ref 26.8–34.3)
MCHC RBC AUTO-ENTMCNC: 35.9 G/DL (ref 31.4–37.4)
MCV RBC AUTO: 110 FL (ref 82–98)
MONOCYTES # BLD AUTO: 0.56 THOUSAND/ΜL (ref 0.17–1.22)
MONOCYTES NFR BLD AUTO: 9 % (ref 4–12)
NEUTROPHILS # BLD AUTO: 3.86 THOUSANDS/ΜL (ref 1.85–7.62)
NEUTS SEG NFR BLD AUTO: 64 % (ref 43–75)
NRBC BLD AUTO-RTO: 0 /100 WBCS
PLATELET # BLD AUTO: 230 THOUSANDS/UL (ref 149–390)
PMV BLD AUTO: 9.6 FL (ref 8.9–12.7)
POTASSIUM SERPL-SCNC: 4.4 MMOL/L (ref 3.5–5.3)
PROT SERPL-MCNC: 6.7 G/DL (ref 6.4–8.4)
PSA SERPL-MCNC: 0.21 NG/ML (ref 0–4)
RBC # BLD AUTO: 2.32 MILLION/UL (ref 3.88–5.62)
RETICS # AUTO: ABNORMAL 10*3/UL (ref 14356–105094)
RETICS # CALC: 10.74 % (ref 0.37–1.87)
SODIUM SERPL-SCNC: 138 MMOL/L (ref 135–147)
URATE SERPL-MCNC: 4.9 MG/DL (ref 3.5–8.5)
WBC # BLD AUTO: 5.95 THOUSAND/UL (ref 4.31–10.16)

## 2024-10-14 PROCEDURE — G0103 PSA SCREENING: HCPCS

## 2024-10-14 PROCEDURE — 36415 COLL VENOUS BLD VENIPUNCTURE: CPT

## 2024-10-14 PROCEDURE — 83615 LACTATE (LD) (LDH) ENZYME: CPT

## 2024-10-14 PROCEDURE — 85025 COMPLETE CBC W/AUTO DIFF WBC: CPT

## 2024-10-14 PROCEDURE — 80053 COMPREHEN METABOLIC PANEL: CPT

## 2024-10-14 PROCEDURE — 85045 AUTOMATED RETICULOCYTE COUNT: CPT

## 2024-10-14 PROCEDURE — 83036 HEMOGLOBIN GLYCOSYLATED A1C: CPT

## 2024-10-14 PROCEDURE — 84550 ASSAY OF BLOOD/URIC ACID: CPT

## 2024-10-14 RX ORDER — SODIUM CHLORIDE 9 MG/ML
125 INJECTION, SOLUTION INTRAVENOUS CONTINUOUS
Status: CANCELLED | OUTPATIENT
Start: 2024-10-14

## 2024-10-15 ENCOUNTER — HOSPITAL ENCOUNTER (OUTPATIENT)
Dept: GASTROENTEROLOGY | Facility: HOSPITAL | Age: 74
Setting detail: OUTPATIENT SURGERY
Discharge: HOME | End: 2024-10-15
Attending: INTERNAL MEDICINE
Payer: MEDICARE

## 2024-10-15 ENCOUNTER — ANESTHESIA EVENT (OUTPATIENT)
Dept: GASTROENTEROLOGY | Facility: HOSPITAL | Age: 74
End: 2024-10-15
Payer: MEDICARE

## 2024-10-15 ENCOUNTER — ANESTHESIA (OUTPATIENT)
Dept: GASTROENTEROLOGY | Facility: HOSPITAL | Age: 74
End: 2024-10-15
Payer: MEDICARE

## 2024-10-15 VITALS
TEMPERATURE: 98.6 F | OXYGEN SATURATION: 96 % | HEART RATE: 69 BPM | HEIGHT: 67 IN | SYSTOLIC BLOOD PRESSURE: 109 MMHG | RESPIRATION RATE: 18 BRPM | DIASTOLIC BLOOD PRESSURE: 58 MMHG | WEIGHT: 191 LBS | BODY MASS INDEX: 29.98 KG/M2

## 2024-10-15 DIAGNOSIS — J18.9 PNEUMONIA OF RIGHT MIDDLE LOBE DUE TO INFECTIOUS ORGANISM: ICD-10-CM

## 2024-10-15 LAB
BASOPHILS NFR FLD: 0 %
EOSINOPHIL NFR FLD MANUAL: 0 %
LYMPHOCYTES NFR BLD AUTO: 0 %
MACROPHAGES NFR FLD: 0 %
NEUTS SEG NFR BLD AUTO: 0 %
TOTAL CELLS COUNTED SPEC: 0
UNIDENT CELLS # BLD: 0 % (ref 0–0)

## 2024-10-15 PROCEDURE — 87206 SMEAR FLUORESCENT/ACID STAI: CPT | Performed by: INTERNAL MEDICINE

## 2024-10-15 PROCEDURE — 87798 DETECT AGENT NOS DNA AMP: CPT | Performed by: INTERNAL MEDICINE

## 2024-10-15 PROCEDURE — 87070 CULTURE OTHR SPECIMN AEROBIC: CPT | Performed by: INTERNAL MEDICINE

## 2024-10-15 PROCEDURE — 87116 MYCOBACTERIA CULTURE: CPT | Performed by: INTERNAL MEDICINE

## 2024-10-15 PROCEDURE — 89051 BODY FLUID CELL COUNT: CPT | Performed by: INTERNAL MEDICINE

## 2024-10-15 PROCEDURE — 87102 FUNGUS ISOLATION CULTURE: CPT | Performed by: INTERNAL MEDICINE

## 2024-10-15 PROCEDURE — 88112 CYTOPATH CELL ENHANCE TECH: CPT | Performed by: PATHOLOGY

## 2024-10-15 PROCEDURE — 87205 SMEAR GRAM STAIN: CPT | Performed by: INTERNAL MEDICINE

## 2024-10-15 RX ORDER — SODIUM CHLORIDE 9 MG/ML
125 INJECTION, SOLUTION INTRAVENOUS CONTINUOUS
Status: DISCONTINUED | OUTPATIENT
Start: 2024-10-15 | End: 2024-10-19 | Stop reason: HOSPADM

## 2024-10-15 RX ORDER — LIDOCAINE HYDROCHLORIDE 20 MG/ML
INJECTION, SOLUTION EPIDURAL; INFILTRATION; INTRACAUDAL; PERINEURAL AS NEEDED
Status: DISCONTINUED | OUTPATIENT
Start: 2024-10-15 | End: 2024-10-15

## 2024-10-15 RX ORDER — FENTANYL CITRATE 50 UG/ML
INJECTION, SOLUTION INTRAMUSCULAR; INTRAVENOUS AS NEEDED
Status: DISCONTINUED | OUTPATIENT
Start: 2024-10-15 | End: 2024-10-15

## 2024-10-15 RX ORDER — PROPOFOL 10 MG/ML
INJECTION, EMULSION INTRAVENOUS AS NEEDED
Status: DISCONTINUED | OUTPATIENT
Start: 2024-10-15 | End: 2024-10-15

## 2024-10-15 RX ORDER — DEXAMETHASONE SODIUM PHOSPHATE 10 MG/ML
INJECTION, SOLUTION INTRAMUSCULAR; INTRAVENOUS AS NEEDED
Status: DISCONTINUED | OUTPATIENT
Start: 2024-10-15 | End: 2024-10-15

## 2024-10-15 RX ORDER — PHENYLEPHRINE HCL IN 0.9% NACL 1 MG/10 ML
SYRINGE (ML) INTRAVENOUS AS NEEDED
Status: DISCONTINUED | OUTPATIENT
Start: 2024-10-15 | End: 2024-10-15

## 2024-10-15 RX ORDER — SODIUM CHLORIDE, SODIUM LACTATE, POTASSIUM CHLORIDE, CALCIUM CHLORIDE 600; 310; 30; 20 MG/100ML; MG/100ML; MG/100ML; MG/100ML
INJECTION, SOLUTION INTRAVENOUS CONTINUOUS PRN
Status: DISCONTINUED | OUTPATIENT
Start: 2024-10-15 | End: 2024-10-15

## 2024-10-15 RX ADMIN — PROPOFOL 120 MCG/KG/MIN: 10 INJECTION, EMULSION INTRAVENOUS at 12:01

## 2024-10-15 RX ADMIN — FENTANYL CITRATE 25 MCG: 50 INJECTION INTRAMUSCULAR; INTRAVENOUS at 12:08

## 2024-10-15 RX ADMIN — PROPOFOL 50 MG: 10 INJECTION, EMULSION INTRAVENOUS at 12:00

## 2024-10-15 RX ADMIN — LIDOCAINE HYDROCHLORIDE 100 MG: 20 INJECTION, SOLUTION EPIDURAL; INFILTRATION; INTRACAUDAL at 11:59

## 2024-10-15 RX ADMIN — Medication 100 MCG: at 12:16

## 2024-10-15 RX ADMIN — SODIUM CHLORIDE, SODIUM LACTATE, POTASSIUM CHLORIDE, AND CALCIUM CHLORIDE: .6; .31; .03; .02 INJECTION, SOLUTION INTRAVENOUS at 12:11

## 2024-10-15 RX ADMIN — FENTANYL CITRATE 25 MCG: 50 INJECTION INTRAMUSCULAR; INTRAVENOUS at 12:04

## 2024-10-15 RX ADMIN — Medication 100 MCG: at 12:11

## 2024-10-15 RX ADMIN — FENTANYL CITRATE 25 MCG: 50 INJECTION INTRAMUSCULAR; INTRAVENOUS at 12:06

## 2024-10-15 RX ADMIN — Medication 100 MCG: at 12:06

## 2024-10-15 RX ADMIN — DEXAMETHASONE SODIUM PHOSPHATE 10 MG: 10 INJECTION INTRAMUSCULAR; INTRAVENOUS at 12:00

## 2024-10-15 RX ADMIN — FENTANYL CITRATE 25 MCG: 50 INJECTION INTRAMUSCULAR; INTRAVENOUS at 11:59

## 2024-10-15 RX ADMIN — SODIUM CHLORIDE, SODIUM LACTATE, POTASSIUM CHLORIDE, AND CALCIUM CHLORIDE: .6; .31; .03; .02 INJECTION, SOLUTION INTRAVENOUS at 11:40

## 2024-10-15 RX ADMIN — PROPOFOL 150 MG: 10 INJECTION, EMULSION INTRAVENOUS at 11:59

## 2024-10-15 RX ADMIN — SODIUM CHLORIDE 125 ML/HR: 0.9 INJECTION, SOLUTION INTRAVENOUS at 11:27

## 2024-10-15 RX ADMIN — Medication 100 MCG: at 12:08

## 2024-10-15 NOTE — DISCHARGE INSTRUCTIONS
Flexible Bronchoscopy  Discharge Instructions      Call Dr. Torrez at [phone] for the results of the bronchoscopy on 10/21/2024.      It will be normal for you to have a slight fever for 24 hours up to 102. It is OK to take tylenol to reduce the fever. Please avoid ibuprophen.    It will also be normal to have some coughing up of blood in small amounts for 24-48 hours, particularly if your procedure included biopsies. If this is increasing or is a large volume of blood call the office immediately during normal business hours or call 911 to go to the emergency room if significant bleeding.     OK to resume Pradaxa (blood thinner) 10/16/2024    WHAT YOU NEED TO KNOW:   A flexible bronchoscopy is a procedure to look inside your respiratory system. Healthcare providers use a bronchoscope, which is a soft, bendable tube with a light and tiny camera on the end. Pictures of your respiratory system appear on a monitor during the procedure.   DISCHARGE INSTRUCTIONS:   Medicines:   Call the pulmonary office if there are any medication questions related to the procedure or any medications prescribed after your procedure    Take your medicine as directed.  Bring your medication list or the pill bottles to follow-up visits. Carry your medicine list with you in case of an emergency.      Follow up with your healthcare provider as directed:  Ask when you should expect the results of your procedure. Write down your questions so you remember to ask them during your visits.  Self-care:   Try not to cough or aggressively clear your throat for a few days. This will help prevent bleeding.  Do not smoke. Smoking can cause severe damage to your airway. Ask  for information if you need help quitting.  Contact your healthcare provider if:     You have new symptoms or your symptoms are worse than before.    You have questions or concerns about your condition or care.    Seek care immediately or call 911 if:   You cannot swallow.    You  cough up unexpected or a large amount of blood (see above).    You are confused and dizzy or feel like you are going to faint.    You have  increasing shortness of breath.    Your lips or nails turn blue.    You have chest pain or feel like your heart is beating faster than normal.  © 2016 [employer]. Information is for End User's use only and may not be sold, redistributed or otherwise used for commercial purposes. All illustrations and images included in CareNotes® are the copyrighted property of [employer] or [employer].  The above information is an  only. It is not intended as medical advice for individual conditions or treatments. Talk to your doctor, nurse or pharmacist before following any medical regimen to see if it is safe and effective for you.

## 2024-10-15 NOTE — ANESTHESIA POSTPROCEDURE EVALUATION
Post-Op Assessment Note    CV Status:  Stable  Pain Score: 0    Pain management: adequate       Mental Status:  Awake and sleepy   Hydration Status:  Euvolemic   PONV Controlled:  Controlled   Airway Patency:  Patent and adequate  Two or more mitigation strategies used for obstructive sleep apnea   Post Op Vitals Reviewed: Yes    No anethesia notable event occurred.    Staff: CRNA, Anesthesiologist           Last Filed PACU Vitals:  Vitals Value Taken Time   Temp 98.6 °F (37 °C) 10/15/24 1223   Pulse 69 10/15/24 1223   /52 10/15/24 1223   Resp 18 10/15/24 1223   SpO2 100 % 10/15/24 1223       Modified Chandrakant:  Activity: 2 (10/15/2024 12:23 PM)  Respiration: 2 (10/15/2024 12:23 PM)  Circulation: 1 (10/15/2024 12:23 PM)  Consciousness: 1 (10/15/2024 12:23 PM)  Oxygen Saturation: 1 (10/15/2024 12:23 PM)  Modified Chandrakant Score: 7 (10/15/2024 12:23 PM)

## 2024-10-15 NOTE — ANESTHESIA PREPROCEDURE EVALUATION
Procedure:  BRONCHOSCOPY    Relevant Problems   ANESTHESIA (within normal limits)      CARDIO   (+) Chronic combined systolic and diastolic congestive heart failure (HCC)   (+) Essential hypertension   (+) Hypercholesterolemia   (+) Long QT syndrome type 2   (+) Paroxysmal A-fib (HCC)      GI/HEPATIC   (+) Gastroesophageal reflux disease without esophagitis      /RENAL   (+) BPH with obstruction/lower urinary tract symptoms      HEMATOLOGY   (+) Anemia   (+) Autoimmune hemolytic anemia (HCC)      MUSCULOSKELETAL   (+) Arthritis   (+) DDD (degenerative disc disease), cervical      NEURO/PSYCH  THC use   (+) Depression, recurrent (HCC)      PULMONARY   (+) DIXIE not currently treated   (+) Pneumonia of right middle lobe due to infectious organism      Behavioral Health   (+) Alcohol use (Resolved)      Orthopedic/Musculoskeletal   (+) Spinal stenosis of lumbar region      Other   (+) CLL (chronic lymphocytic leukemia) (HCC)   (+) Narrow complex tachycardia (HCC)        Physical Exam    Airway    Mallampati score: III  TM Distance: >3 FB  Neck ROM: full     Dental   No notable dental hx     Cardiovascular  Rhythm: regular, Rate: normal, Cardiovascular exam normal    Pulmonary   Rhonchi    Other Findings        Anesthesia Plan  ASA Score- 3     Anesthesia Type- general with ASA Monitors.         Additional Monitors:     Airway Plan: LMA.           Plan Factors-Exercise tolerance (METS): >4 METS.Exercise comment: Prior to dx with CLL.    Chart reviewed. EKG reviewed.  Existing labs reviewed. Patient summary reviewed.    Patient is not a current smoker.              Induction- intravenous.    Postoperative Plan-         Informed Consent- Anesthetic plan and risks discussed with patient.  I personally reviewed this patient with the CRNA. Discussed and agreed on the Anesthesia Plan with the CRNA..

## 2024-10-16 LAB
RHODAMINE-AURAMINE STN SPEC: NORMAL
RHODAMINE-AURAMINE STN SPEC: NORMAL

## 2024-10-17 LAB
BACTERIA BRONCH AEROBE CULT: NO GROWTH
EST. AVERAGE GLUCOSE BLD GHB EST-MCNC: <74 MG/DL
GRAM STN SPEC: NORMAL
HBA1C MFR BLD: <4.2 %
PNEUMOCYSTIS PCR: NEGATIVE

## 2024-10-17 RX ORDER — ACETAMINOPHEN 325 MG/1
650 TABLET ORAL ONCE
Status: CANCELLED | OUTPATIENT
Start: 2024-10-24

## 2024-10-17 RX ORDER — SODIUM CHLORIDE 9 MG/ML
20 INJECTION, SOLUTION INTRAVENOUS ONCE
Status: CANCELLED | OUTPATIENT
Start: 2024-10-24

## 2024-10-18 DIAGNOSIS — J18.9 PNEUMONIA OF RIGHT MIDDLE LOBE DUE TO INFECTIOUS ORGANISM: Primary | ICD-10-CM

## 2024-10-18 LAB
BACTERIA BRONCH AEROBE CULT: NORMAL
BACTERIA BRONCH AEROBE CULT: NORMAL
GRAM STN SPEC: NORMAL
MYCOBACTERIUM TUBERCULOSIS COMPLEX PCR: NORMAL
SCAN RESULT: NORMAL

## 2024-10-18 PROCEDURE — 88112 CYTOPATH CELL ENHANCE TECH: CPT | Performed by: PATHOLOGY

## 2024-10-19 LAB
LEFT EYE DIABETIC RETINOPATHY: NORMAL
RIGHT EYE DIABETIC RETINOPATHY: NORMAL
SEVERITY (EYE EXAM): NORMAL

## 2024-10-21 ENCOUNTER — NURSE TRIAGE (OUTPATIENT)
Age: 74
End: 2024-10-21

## 2024-10-21 ENCOUNTER — APPOINTMENT (OUTPATIENT)
Dept: LAB | Facility: HOSPITAL | Age: 74
End: 2024-10-21
Payer: MEDICARE

## 2024-10-21 DIAGNOSIS — D64.9 ANEMIA, UNSPECIFIED TYPE: ICD-10-CM

## 2024-10-21 DIAGNOSIS — C91.10 CLL (CHRONIC LYMPHOCYTIC LEUKEMIA) (HCC): ICD-10-CM

## 2024-10-21 DIAGNOSIS — F41.8 SITUATIONAL ANXIETY: ICD-10-CM

## 2024-10-21 LAB
ALBUMIN SERPL BCG-MCNC: 4.4 G/DL (ref 3.5–5)
ALP SERPL-CCNC: 90 U/L (ref 34–104)
ALT SERPL W P-5'-P-CCNC: 24 U/L (ref 7–52)
ANION GAP SERPL CALCULATED.3IONS-SCNC: 4 MMOL/L (ref 4–13)
AST SERPL W P-5'-P-CCNC: 20 U/L (ref 13–39)
BASOPHILS # BLD AUTO: 0.04 THOUSANDS/ΜL (ref 0–0.1)
BASOPHILS NFR BLD AUTO: 0 % (ref 0–1)
BILIRUB SERPL-MCNC: 1.9 MG/DL (ref 0.2–1)
BUN SERPL-MCNC: 11 MG/DL (ref 5–25)
CALCIUM SERPL-MCNC: 9.6 MG/DL (ref 8.4–10.2)
CHLORIDE SERPL-SCNC: 98 MMOL/L (ref 96–108)
CO2 SERPL-SCNC: 32 MMOL/L (ref 21–32)
CREAT SERPL-MCNC: 0.72 MG/DL (ref 0.6–1.3)
EOSINOPHIL # BLD AUTO: 0.06 THOUSAND/ΜL (ref 0–0.61)
EOSINOPHIL NFR BLD AUTO: 1 % (ref 0–6)
ERYTHROCYTE [DISTWIDTH] IN BLOOD BY AUTOMATED COUNT: 19 % (ref 11.6–15.1)
FUNGUS SPEC CULT: NORMAL
GFR SERPL CREATININE-BSD FRML MDRD: 93 ML/MIN/1.73SQ M
GLUCOSE P FAST SERPL-MCNC: 112 MG/DL (ref 65–99)
HCT VFR BLD AUTO: 29.4 % (ref 36.5–49.3)
HGB BLD-MCNC: 10.1 G/DL (ref 12–17)
IMM GRANULOCYTES # BLD AUTO: 0.13 THOUSAND/UL (ref 0–0.2)
IMM GRANULOCYTES NFR BLD AUTO: 1 % (ref 0–2)
LDH SERPL-CCNC: 230 U/L (ref 140–271)
LYMPHOCYTES # BLD AUTO: 1.47 THOUSANDS/ΜL (ref 0.6–4.47)
LYMPHOCYTES NFR BLD AUTO: 16 % (ref 14–44)
MCH RBC QN AUTO: 36.5 PG (ref 26.8–34.3)
MCHC RBC AUTO-ENTMCNC: 34.4 G/DL (ref 31.4–37.4)
MCV RBC AUTO: 106 FL (ref 82–98)
MONOCYTES # BLD AUTO: 0.9 THOUSAND/ΜL (ref 0.17–1.22)
MONOCYTES NFR BLD AUTO: 10 % (ref 4–12)
NEUTROPHILS # BLD AUTO: 6.48 THOUSANDS/ΜL (ref 1.85–7.62)
NEUTS SEG NFR BLD AUTO: 72 % (ref 43–75)
NRBC BLD AUTO-RTO: 0 /100 WBCS
PLATELET # BLD AUTO: 308 THOUSANDS/UL (ref 149–390)
PMV BLD AUTO: 9.4 FL (ref 8.9–12.7)
POTASSIUM SERPL-SCNC: 4.2 MMOL/L (ref 3.5–5.3)
PROT SERPL-MCNC: 7.2 G/DL (ref 6.4–8.4)
RBC # BLD AUTO: 2.77 MILLION/UL (ref 3.88–5.62)
RETICS # AUTO: ABNORMAL 10*3/UL (ref 14356–105094)
RETICS # CALC: 8.57 % (ref 0.37–1.87)
SODIUM SERPL-SCNC: 134 MMOL/L (ref 135–147)
URATE SERPL-MCNC: 4.9 MG/DL (ref 3.5–8.5)
WBC # BLD AUTO: 9.08 THOUSAND/UL (ref 4.31–10.16)

## 2024-10-21 PROCEDURE — 83615 LACTATE (LD) (LDH) ENZYME: CPT

## 2024-10-21 PROCEDURE — 85045 AUTOMATED RETICULOCYTE COUNT: CPT

## 2024-10-21 PROCEDURE — 80053 COMPREHEN METABOLIC PANEL: CPT

## 2024-10-21 PROCEDURE — 36415 COLL VENOUS BLD VENIPUNCTURE: CPT

## 2024-10-21 PROCEDURE — 84550 ASSAY OF BLOOD/URIC ACID: CPT

## 2024-10-21 PROCEDURE — 85025 COMPLETE CBC W/AUTO DIFF WBC: CPT

## 2024-10-21 NOTE — TELEPHONE ENCOUNTER
Regarding: cough  ----- Message from Katy GARCIA sent at 10/21/2024  9:31 AM EDT -----  Pt states that he recently had bronchoscopy and he is having coughing spells. Last night was the worst and was wondering if this is something he should expect. Please advise

## 2024-10-21 NOTE — TELEPHONE ENCOUNTER
"  Patient call:  Pt stated provider: Dr. Torrez    Actionable item: Provider review    What is the reason for the call/chief complaint?    Reports coughing since bronchoscopy last Tuesday. Clear mucous. Denies hemoptysis or any other symptoms.     Relief with Ricola cough drops and Tessalon perles.     Would like to know if this is to be expected.      Dispo: Routing to provider for review.   [facility]  Agrees with plan.   All questions answered.     Reason for Disposition   Cough with no complications    Answer Assessment - Initial Assessment Questions  1. ONSET: \"When did the cough begin?\"       Last Tuesday  2. SEVERITY: \"How bad is the cough today?\"       Persistent severity although a little better right now  3. SPUTUM: \"Describe the color of your sputum\" (none, dry cough; clear, white, yellow, green)      clear  4. HEMOPTYSIS: \"Are you coughing up any blood?\" If so ask: \"How much?\" (flecks, streaks, tablespoons, etc.)      denies  5. DIFFICULTY BREATHING: \"Are you having difficulty breathing?\" If Yes, ask: \"How bad is it?\" (e.g., mild, moderate, severe)     - MILD: No SOB at rest, mild SOB with walking, speaks normally in sentences, can lay down, no retractions, pulse < 100.     - MODERATE: SOB at rest, SOB with minimal exertion and prefers to sit, cannot lie down flat, speaks in phrases, mild retractions, audible wheezing, pulse 100-120.     - SEVERE: Very SOB at rest, speaks in single words, struggling to breathe, sitting hunched forward, retractions, pulse > 120       denies  6. FEVER: \"Do you have a fever?\" If Yes, ask: \"What is your temperature, how was it measured, and when did it start?\"      denies  7. CARDIAC HISTORY: \"Do you have any history of heart disease?\" (e.g., heart attack, congestive heart failure)       Please see chart  8. LUNG HISTORY: \"Do you have any history of lung disease?\"  (e.g., pulmonary embolus, asthma, emphysema)      Please see chart  9. PE " "RISK FACTORS: \"Do you have a history of blood clots?\" (or: recent major surgery, recent prolonged travel, bedridden)      =  10. OTHER SYMPTOMS: \"Do you have any other symptoms?\" (e.g., runny nose, wheezing, chest pain)       Denies    Protocols used: Cough-ADULT-[place]    "

## 2024-10-22 LAB
MYCOBACTERIUM SPEC CULT: NORMAL
MYCOBACTERIUM SPEC CULT: NORMAL
RHODAMINE-AURAMINE STN SPEC: NORMAL
RHODAMINE-AURAMINE STN SPEC: NORMAL

## 2024-10-22 RX ORDER — LORAZEPAM 0.5 MG/1
TABLET ORAL
Qty: 60 TABLET | Refills: 1 | Status: SHIPPED | OUTPATIENT
Start: 2024-10-22

## 2024-10-22 NOTE — TELEPHONE ENCOUNTER
Patient called to request a refill for their Ativan advised a refill was requested on 10/21/2024 and is pending approval. Patient verbalized understanding and is in agreement.      Patient only has 3 pills left

## 2024-10-24 ENCOUNTER — HOSPITAL ENCOUNTER (OUTPATIENT)
Dept: INFUSION CENTER | Facility: CLINIC | Age: 74
Discharge: HOME | End: 2024-10-24
Payer: MEDICARE

## 2024-10-24 VITALS
DIASTOLIC BLOOD PRESSURE: 76 MMHG | WEIGHT: 194.45 LBS | BODY MASS INDEX: 30.52 KG/M2 | HEIGHT: 67 IN | TEMPERATURE: 98.1 F | HEART RATE: 76 BPM | RESPIRATION RATE: 18 BRPM | SYSTOLIC BLOOD PRESSURE: 154 MMHG

## 2024-10-24 DIAGNOSIS — C91.10 CLL (CHRONIC LYMPHOCYTIC LEUKEMIA) (HCC): Primary | ICD-10-CM

## 2024-10-24 PROBLEM — J18.9 PNEUMONIA OF RIGHT MIDDLE LOBE DUE TO INFECTIOUS ORGANISM: Status: RESOLVED | Noted: 2024-08-20 | Resolved: 2024-10-24

## 2024-10-24 LAB
BACTERIA BRONCH AEROBE CULT: NORMAL
BACTERIA BRONCH AEROBE CULT: NORMAL
GRAM STN SPEC: NORMAL

## 2024-10-24 PROCEDURE — 96367 TX/PROPH/DG ADDL SEQ IV INF: CPT

## 2024-10-24 PROCEDURE — 96413 CHEMO IV INFUSION 1 HR: CPT

## 2024-10-24 PROCEDURE — 96415 CHEMO IV INFUSION ADDL HR: CPT

## 2024-10-24 RX ORDER — SODIUM CHLORIDE 9 MG/ML
20 INJECTION, SOLUTION INTRAVENOUS ONCE
Status: COMPLETED | OUTPATIENT
Start: 2024-10-24 | End: 2024-10-24

## 2024-10-24 RX ORDER — ACETAMINOPHEN 325 MG/1
650 TABLET ORAL ONCE
Status: COMPLETED | OUTPATIENT
Start: 2024-10-24 | End: 2024-10-24

## 2024-10-24 RX ADMIN — ACETAMINOPHEN 650 MG: 325 TABLET ORAL at 09:33

## 2024-10-24 RX ADMIN — DEXAMETHASONE SODIUM PHOSPHATE 20 MG: 10 INJECTION, SOLUTION INTRAMUSCULAR; INTRAVENOUS at 09:33

## 2024-10-24 RX ADMIN — DIPHENHYDRAMINE HYDROCHLORIDE 25 MG: 50 INJECTION, SOLUTION INTRAMUSCULAR; INTRAVENOUS at 10:00

## 2024-10-24 RX ADMIN — OBINUTUZUMAB 1000 MG: 1000 INJECTION, SOLUTION, CONCENTRATE INTRAVENOUS at 10:50

## 2024-10-24 RX ADMIN — SODIUM CHLORIDE 20 ML/HR: 0.9 INJECTION, SOLUTION INTRAVENOUS at 09:31

## 2024-10-24 NOTE — PROGRESS NOTES
Pt tolerated treatment today without incident.  Pt declined AVS, but is aware of his next appt on Nov. 21 at 0930

## 2024-10-28 ENCOUNTER — APPOINTMENT (OUTPATIENT)
Dept: LAB | Facility: HOSPITAL | Age: 74
End: 2024-10-28
Payer: MEDICARE

## 2024-10-28 DIAGNOSIS — C91.10 CLL (CHRONIC LYMPHOCYTIC LEUKEMIA) (HCC): ICD-10-CM

## 2024-10-28 LAB
BASOPHILS # BLD AUTO: 0.04 THOUSANDS/ΜL (ref 0–0.1)
BASOPHILS NFR BLD AUTO: 0 % (ref 0–1)
EOSINOPHIL # BLD AUTO: 0.11 THOUSAND/ΜL (ref 0–0.61)
EOSINOPHIL NFR BLD AUTO: 1 % (ref 0–6)
ERYTHROCYTE [DISTWIDTH] IN BLOOD BY AUTOMATED COUNT: 19.7 % (ref 11.6–15.1)
FUNGUS SPEC CULT: NORMAL
HCT VFR BLD AUTO: 30.9 % (ref 36.5–49.3)
HGB BLD-MCNC: 10.5 G/DL (ref 12–17)
IMM GRANULOCYTES # BLD AUTO: 0.19 THOUSAND/UL (ref 0–0.2)
IMM GRANULOCYTES NFR BLD AUTO: 2 % (ref 0–2)
LDH SERPL-CCNC: 310 U/L (ref 140–271)
LYMPHOCYTES # BLD AUTO: 1.72 THOUSANDS/ΜL (ref 0.6–4.47)
LYMPHOCYTES NFR BLD AUTO: 17 % (ref 14–44)
MCH RBC QN AUTO: 36.8 PG (ref 26.8–34.3)
MCHC RBC AUTO-ENTMCNC: 34 G/DL (ref 31.4–37.4)
MCV RBC AUTO: 108 FL (ref 82–98)
MONOCYTES # BLD AUTO: 0.76 THOUSAND/ΜL (ref 0.17–1.22)
MONOCYTES NFR BLD AUTO: 8 % (ref 4–12)
NEUTROPHILS # BLD AUTO: 7.24 THOUSANDS/ΜL (ref 1.85–7.62)
NEUTS SEG NFR BLD AUTO: 72 % (ref 43–75)
NRBC BLD AUTO-RTO: 0 /100 WBCS
PLATELET # BLD AUTO: 327 THOUSANDS/UL (ref 149–390)
PMV BLD AUTO: 8.9 FL (ref 8.9–12.7)
RBC # BLD AUTO: 2.85 MILLION/UL (ref 3.88–5.62)
RETICS # AUTO: ABNORMAL 10*3/UL (ref 14356–105094)
RETICS # CALC: 10.39 % (ref 0.37–1.87)
WBC # BLD AUTO: 10.06 THOUSAND/UL (ref 4.31–10.16)

## 2024-10-28 PROCEDURE — 85045 AUTOMATED RETICULOCYTE COUNT: CPT

## 2024-10-28 PROCEDURE — 83615 LACTATE (LD) (LDH) ENZYME: CPT

## 2024-10-28 PROCEDURE — 85025 COMPLETE CBC W/AUTO DIFF WBC: CPT

## 2024-10-28 PROCEDURE — 36415 COLL VENOUS BLD VENIPUNCTURE: CPT

## 2024-10-29 ENCOUNTER — OFFICE VISIT (OUTPATIENT)
Dept: HEMATOLOGY ONCOLOGY | Facility: CLINIC | Age: 74
End: 2024-10-29
Payer: MEDICARE

## 2024-10-29 VITALS
BODY MASS INDEX: 30.37 KG/M2 | DIASTOLIC BLOOD PRESSURE: 70 MMHG | OXYGEN SATURATION: 99 % | HEART RATE: 85 BPM | SYSTOLIC BLOOD PRESSURE: 124 MMHG | TEMPERATURE: 97 F | HEIGHT: 67 IN | WEIGHT: 193.5 LBS | RESPIRATION RATE: 18 BRPM

## 2024-10-29 DIAGNOSIS — E80.6 HYPERBILIRUBINEMIA: ICD-10-CM

## 2024-10-29 DIAGNOSIS — I48.0 PAROXYSMAL A-FIB (HCC): ICD-10-CM

## 2024-10-29 DIAGNOSIS — N40.1 BPH WITH OBSTRUCTION/LOWER URINARY TRACT SYMPTOMS: ICD-10-CM

## 2024-10-29 DIAGNOSIS — I27.20 PULMONARY HYPERTENSION (HCC): ICD-10-CM

## 2024-10-29 DIAGNOSIS — D59.10 AUTOIMMUNE HEMOLYTIC ANEMIA (HCC): ICD-10-CM

## 2024-10-29 DIAGNOSIS — D64.9 ANEMIA, UNSPECIFIED TYPE: ICD-10-CM

## 2024-10-29 DIAGNOSIS — C91.10 CLL (CHRONIC LYMPHOCYTIC LEUKEMIA) (HCC): Primary | ICD-10-CM

## 2024-10-29 DIAGNOSIS — N13.8 BPH WITH OBSTRUCTION/LOWER URINARY TRACT SYMPTOMS: ICD-10-CM

## 2024-10-29 DIAGNOSIS — E53.8 VITAMIN B 12 DEFICIENCY: ICD-10-CM

## 2024-10-29 DIAGNOSIS — K70.30 ALCOHOLIC CIRRHOSIS OF LIVER WITHOUT ASCITES (HCC): ICD-10-CM

## 2024-10-29 DIAGNOSIS — R16.2 HEPATOSPLENOMEGALY: ICD-10-CM

## 2024-10-29 DIAGNOSIS — M19.90 ARTHRITIS: ICD-10-CM

## 2024-10-29 DIAGNOSIS — I50.42 CHRONIC COMBINED SYSTOLIC AND DIASTOLIC CONGESTIVE HEART FAILURE (HCC): ICD-10-CM

## 2024-10-29 DIAGNOSIS — Z95.0 PACEMAKER: ICD-10-CM

## 2024-10-29 PROCEDURE — G2211 COMPLEX E/M VISIT ADD ON: HCPCS | Performed by: INTERNAL MEDICINE

## 2024-10-29 PROCEDURE — 99214 OFFICE O/P EST MOD 30 MIN: CPT | Performed by: INTERNAL MEDICINE

## 2024-10-29 NOTE — PATIENT INSTRUCTIONS
Admission to [facility] on 11/6/2024 under [facility] service for venetoclax for CLL and risk of tumor lysis syndrome.  Patient will bring venetoclax from home.  Blood work every week.  Follow-up in 3 weeks..

## 2024-10-29 NOTE — PROGRESS NOTES
Progress Note - Electrophysiology-Cardiology (EP)   Murphy Willams 74 y.o. male MRN: [id number]  Unit/Bed#:  Encounter: [id number]           1. Long QT syndrome type 2        2. Chronic combined systolic and diastolic congestive heart failure (HCC)        3. Paroxysmal A-fib (HCC)  POCT ECG    dabigatran etexilate (PRADAXA) 150 mg capsu    POCT ECG      4. Essential hypertension        5. Pulmonary hypertension (HCC)        6. Alcoholic cirrhosis of liver without ascites (HCC)        7. Autoimmune hemolytic anemia (HCC)        8. CLL (chronic lymphocytic leukemia) (HCC)        9. Pacemaker        10. Narrow complex tachycardia (HCC)              Summary of my recommendations   EF improved to 55%  VF and post ICD - Device optimization if can have narrower QRS, QTc and RBBB morphology - min QRS is 140 ms, LBBB  Continue with dabigatran for PAF  Continue nadolol has Long QT 2  Avoid other QT prolonging meds     Patient has CLL- Intermittent flareups  New antineoplastic agents being used  There is a small but definite chance of developing thrombocytopenia down the line  Prefer to have evaluation for possible Watchman-Dr. Charles/Dr. Saavedra          Clinical conditions  Cardiac arrest necessitating external resuscitation and shock  Long QT type II  Bradycardia at baseline  Intrinsic ventricular rate 50/min, progress to 2-1 heart block, progressed to complete heart block during the procedure  Post BiV ICD     Cardiomyopathy with LVEF 30% currently - EF 60% in June 2022     QT prolonging medication  Paroxysmal A-fib  Alcohol use  Obesity  Alzheimer's dementia  History of CLL  Hypertension  UTI            Assessment & Plan     Cardiac arrest necessitating external resuscitation and shock  Long QT type II  Bradycardia at baseline  Intrinsic ventricular rate 50/min, progress to 2-1 heart block, progressed to complete heart block during the procedure  Post BiV ICD    Patient currently on nadolol although this has limited effect  and long QT type II  Patient should be evaluated for QT genotype as we think he is type II based on his son being positive for type II  Patient does have BiV ICD in place  Avoid all QT prolonging conditions-hypokalemia, medications, bradycardia          Cardiomyopathy with LVEF 30% post arrest  - EF 60% in June 2022 - currently improved to 45% oct 2023, 55% in 2024  Patient is on optimal therapy for heart failure  Medications can be titrated after that as needed by primary cardiologist           QT prolonging medication  Avoid all QT prolonging medication          Paroxysmal A-fib  Long-term anticoagulation  Currently we are going with rate control and anticoagulation strategy  Rate control nadolol  Anticoagulation-dabigatran    Patient having flareup of CLL, new antineoplastic medications being used  Down the line he may develop lower platelets which make use of oral anticoagulants risky  Prefer to have the patient evaluated for Watchman          Alcohol use  Advised to avoid alcohol  Prior history of significant alcohol use      Obesity  BMI 30  Diet is the most important way to lose weight      Alzheimer's dementia  At baseline      History of CLL  Follow-up with primary  Recent flareup      Hypertension  110/60  Currently on losartan, nadolol      UTI  Self catheterizes      Mixed hyperlipidemia  On atorvastatin  No myositis or myalgia            History of Present Illness   HPI: Murphy Willams is a 74 y.o. year old male following up post VF arrest and BiV ICD placement     Slowly improving in activity and EF is up to 55%  As far as cardiac symptoms he is doing well    He has some flareup of CLL  He is being started on antineoplastic agents    The patient has significant medical illnesses which include  Cardiac arrest necessitating external resuscitation and shock  Long QT type II  Bradycardia at baseline  Intrinsic ventricular rate 50/min, progress to 2-1 heart block, progressed to complete heart block during  the procedure  Post BiV ICD  Cardiomyopathy with LVEF 30% currently - EF 60% in June 2022  QT prolonging medication  Paroxysmal A-fib  Alcohol use  Obesity  Alzheimer's dementia  History of CLL  Hypertension  UTI    Patient does have a long history of multiple diseases  Has undergone prior diverticular surgery, TURP, self catheterizes for urinary retention    He has slowly started moving around  Not complaining of angina, worsening orthopnea or PND or leg swelling  No further complaints of presyncope or syncope  Activities are limited and has exertional intolerance      Patient does have a history of snoring, morning fatigue and daytime sleepiness      Historical Information   Past Medical History:   Diagnosis Date    Anxiety     BPH (benign prostatic hypertrophy)     Cancer ([facility])     CLL    CLL (chronic lymphocytic leukemia) ([facility])     2009    Colon polyp     Concussion     Resolved: 08/22/16    COVID-19 12/29/2023    Depression     Diverticulitis 01/13/2020 2014 CT done 11/19 12/19 CT done     E coli bacteremia 06/27/2021 2/2 blood cultures with Ecoli  Source appears to be the urine     Epididymitis 05/19/2021 5/2021    Gastrointestinal hemorrhage     Last assessed: 08/27/13    GERD (gastroesophageal reflux disease)     H/O vitamin D deficiency 07/07/2021    Hearing loss of aging     Hiatal hernia     History of right hip replacement 04/19/2021    History of transfusion     Hyperlipidemia     Hypertension     Hyponatremia 03/06/2023    Iron deficiency anemia     Livedo reticularis 09/02/2024    Microscopic hematuria     Last assessed: 06/28/13    OA (osteoarthritis)     right hip    Obesity (BMI 30.0-34.9) 04/07/2022    Pneumothorax     Primary osteoarthritis of right hip 09/29/2017    He is status post right hip arthroplasty    Prostatitis     Pulmonary hypertension (HCC)     QT prolongation     Seasonal allergies     Sepsis (HCC) 03/06/2023    Unresponsive episode 03/07/2023    Urinary tract infection  associated with catheterization of urinary tract  ([facility]) 02/05/2021    Pseudomonal UTI asymptomatic.  Per Urology do not treat at this time.    Urinary tract infection associated with catheterization of urinary tract  ([facility]) 02/05/2021    Pseudomonal UTI asymptomatic.  Per Urology do not treat at this time.  He did have urosepsis from E coli 7/21    UTI (urinary tract infection)     in past    Ventricular fibrillation ([facility]) 03/06/2023    Noted after unresponsive episode      Wears glasses      Past Surgical History:   Procedure Laterality Date    ADENOIDECTOMY      BLADDER SURGERY      CARDIAC CATHETERIZATION Left 03/07/2023    Procedure: Cardiac Left Heart Cath;  Surgeon: Rich Ramirez MD;  Location: [facility];  Service: Cardiology    CARDIAC CATHETERIZATION N/A 03/07/2023    Procedure: Cardiac temporary pacemaker;  Surgeon: Rich Ramirez MD;  Location: [facility];  Service: Cardiology    CARDIAC ELECTROPHYSIOLOGY PROCEDURE N/A 03/08/2023    Procedure: Cardiac icd implant;  Surgeon: Filiberto Olsen MD;  Location: [facility];  Service: Cardiology    COLONOSCOPY      CYSTOSCOPY  10/09/2014    Diagnostic    CYSTOSCOPY  06/29/2020    FL CYSTOGRAM  08/15/2022    FRACTURE SURGERY      left lower arm    FRACTURE SURGERY      left femur    HERNIA REPAIR      IR BIOPSY BONE MARROW  9/9/2024    JOINT REPLACEMENT Right     hip    OTHER SURGICAL HISTORY  03/2011    Spinal anesthesia epidural    AZ ARTHRP ACETBLR/PROX FEM PROSTC AGRFT/ALGRFT Right 09/29/2017    Procedure: ARTHROPLASTY HIP TOTAL ANTERIOR;  Surgeon: Roly Liu MD;  Location: [facility];  Service: Orthopedics    TONSILLECTOMY AND ADENOIDECTOMY      TRANSURETHRAL RESECTION OF PROSTATE      x 2    WRIST SURGERY       Social History     Substance and Sexual Activity   Alcohol Use Yes    Alcohol/week: 2.0 - 4.0 standard drinks of alcohol    Types: 2 - 4 Cans of beer per week     Social History     Substance and Sexual Activity   Drug Use Not  "Currently    Types: Marijuana    Comment: \"medical marijuana\"     Social History     Tobacco Use   Smoking Status Former    Current packs/day: 0.00    Types: Cigarettes    Quit date: 1980    Years since quittin.1    Passive exposure: Past   Smokeless Tobacco Never     Social History     Socioeconomic History    Marital status: /Civil Union     Spouse name: Not on file    Number of children: Not on file    Years of education: Not on file    Highest education level: Not on file   Occupational History    Not on file   Tobacco Use    Smoking status: Former     Current packs/day: 0.00     Types: Cigarettes     Quit date: 1980     Years since quittin.1     Passive exposure: Past    Smokeless tobacco: Never   Vaping Use    Vaping status: Never Used   Substance and Sexual Activity    Alcohol use: Yes     Alcohol/week: 2.0 - 4.0 standard drinks of alcohol     Types: 2 - 4 Cans of beer per week    Drug use: Not Currently     Types: Marijuana     Comment: \"medical marijuana\"    Sexual activity: Not Currently   Other Topics Concern    Not on file   Social History Narrative    Not on file     Social Determinants of Health     Financial Resource Strain: Low Risk  (10/23/2023)    Overall Financial Resource Strain (CARDIA)     Difficulty of Paying Living Expenses: Not hard at all   Food Insecurity: No Food Insecurity (2024)    Nursing - Inadequate Food Risk Classification     Worried About Running Out of Food in the Last Year: Never true     Ran Out of Food in the Last Year: Never true     Ran Out of Food in the Last Year: Not on file   Transportation Needs: No Transportation Needs (2024)    PRAPARE - Transportation     Lack of Transportation (Medical): No     Lack of Transportation (Non-Medical): No   Physical Activity: Not on file   Stress: Not on file   Social Connections: Not on file   Intimate Partner Violence: Not on file   Housing Stability: Low Risk  (2024)    Housing Stability Vital " "Sign     Unable to Pay for Housing in the Last Year: No     Number of Times Moved in the Last Year: 0     Homeless in the Last Year: No     .  Family History:  Family History   Problem Relation Age of Onset    No Known Problems Mother     Heart attack Father     Diabetes Brother     Prostate cancer Brother          Meds/Allergies      No current facility-administered medications for this visit.       Not in a hospital admission.      Allergies   Allergen Reactions    Ciprofloxacin Other (See Comments)     LONG QT syndrome    Codeine Other (See Comments)     agitated    Sulfamethoxazole-Trimethoprim GI Intolerance, Abdominal Pain and Other (See Comments)     upset stomach    Augmentin [Amoxicillin-Pot Clavulanate] Rash     Livedo reticularis    Macrobid [Nitrofurantoin] Dizziness, Headache and Fatigue     Per Patient           Objective   Vitals:   Visit Vitals  /60   Pulse 70   Ht 5' 8\" (1.727 m)   Wt 87.6 kg (193 lb 3.2 oz)   BMI 29.38 kg/m²   Smoking Status Former   BSA 2.01 m²        Invasive Devices       None                     ROS  Review of Systems   All other systems reviewed and are negative.  As described in my history of present illness        PHYSICAL EXAM  Physical Exam  Constitutional:       General: He is not in acute distress.     Appearance: Normal appearance. He is obese. He is not ill-appearing.   HENT:      Head: Normocephalic and atraumatic.      Right Ear: External ear normal.      Left Ear: External ear normal.      Nose: Nose normal.      Mouth/Throat:      Comments: Posterior pharynx is crowded  Eyes:      General: No scleral icterus.     Extraocular Movements: Extraocular movements intact.      Conjunctiva/sclera: Conjunctivae normal.      Pupils: Pupils are equal, round, and reactive to light.   Neck:      Comments: Short thick neck  Cardiovascular:      Rate and Rhythm: Normal rate and regular rhythm.      Heart sounds: Normal heart sounds. No murmur heard.     Comments: " paced  Pulmonary:      Effort: No respiratory distress.      Breath sounds: Examination of the right-middle field reveals decreased breath sounds. Examination of the left-middle field reveals decreased breath sounds. Examination of the right-lower field reveals decreased breath sounds. Examination of the left-lower field reveals decreased breath sounds. Decreased breath sounds present.   Abdominal:      Palpations: Abdomen is soft.      Comments: Central obesity present   Musculoskeletal:         General: No swelling or deformity.   Skin:     Coloration: Skin is not jaundiced.      Findings: No bruising or lesion.   Neurological:      Mental Status: He is alert and oriented to person, place, and time. Mental status is at baseline.   Psychiatric:         Mood and Affect: Mood normal.         Behavior: Behavior normal.         Thought Content: Thought content normal.         Judgment: Judgment normal.         LAB RESULTS:    CBC:  WBC   Date Value Ref Range Status   10/28/2024 10.06 4.31 - 10.16 Thousand/uL Final   07/20/2015 22.68 (H) 4.31 - 10.16 Thousand/uL Final     Hemoglobin   Date Value Ref Range Status   10/28/2024 10.5 (L) 12.0 - 17.0 g/dL Final   07/20/2015 10.7 (L) 12.0 - 17.0 g/dL Final     Hematocrit   Date Value Ref Range Status   10/28/2024 30.9 (L) 36.5 - 49.3 % Final   07/20/2015 33.0 (L) 36.5 - 49.3 % Final     MCV   Date Value Ref Range Status   10/28/2024 108 (H) 82 - 98 fL Final   07/20/2015 80 (L) 82 - 98 fL Final     Platelets   Date Value Ref Range Status   10/28/2024 327 149 - 390 Thousands/uL Final   07/20/2015 206 149 - 390 Thousand/uL Final     RBC   Date Value Ref Range Status   10/28/2024 2.85 (L) 3.88 - 5.62 Million/uL Final   07/20/2015 4.14 3.88 - 5.62 Million/uL Final     MCH   Date Value Ref Range Status   10/28/2024 36.8 (H) 26.8 - 34.3 pg Final   07/20/2015 25.8 (L) 26.8 - 34.3 pg Final     MCHC   Date Value Ref Range Status   10/28/2024 34.0 31.4 - 37.4 g/dL Final   07/20/2015  32.4 31.4 - 37.4 g/dL Final     RDW   Date Value Ref Range Status   10/28/2024 19.7 (H) 11.6 - 15.1 % Final   07/20/2015 15.9 (H) 11.6 - 15.1 % Final     MPV   Date Value Ref Range Status   10/28/2024 8.9 8.9 - 12.7 fL Final   07/20/2015 9.9 8.9 - 12.7 fL Final     nRBC   Date Value Ref Range Status   10/28/2024 0 /100 WBCs Final       CMP:  Sodium   Date Value Ref Range Status   07/20/2015 135 (L) 136 - 145 mmol/L Final     Potassium   Date Value Ref Range Status   10/21/2024 4.2 3.5 - 5.3 mmol/L Final   07/20/2015 4.3 3.5 - 5.3 mmol/L Final     Chloride   Date Value Ref Range Status   10/21/2024 98 96 - 108 mmol/L Final   07/20/2015 99 (L) 100 - 108 mmol/L Final     CO2   Date Value Ref Range Status   10/21/2024 32 21 - 32 mmol/L Final     CO2, i-STAT   Date Value Ref Range Status   01/10/2021 25 21 - 32 mmol/L Final     Anion Gap   Date Value Ref Range Status   07/20/2015 9 4 - 13 mmol/L Final     BUN   Date Value Ref Range Status   10/21/2024 11 5 - 25 mg/dL Final   07/20/2015 11 5 - 25 mg/dL Final     Creatinine   Date Value Ref Range Status   10/21/2024 0.72 0.60 - 1.30 mg/dL Final     Comment:     Standardized to IDMS reference method   07/20/2015 0.86 0.60 - 1.30 mg/dL Final     Comment:     Standardized to IDMS reference method     Glucose   Date Value Ref Range Status   07/20/2015 119 65 - 140 mg/dL Final     Comment:     If patient is fasting, the ADA then defines impaired fasting glucose as  >100 mg/dl and diabetes as  >or equal to 126 mg/dl.       Glucose, i-STAT   Date Value Ref Range Status   01/10/2021 130 65 - 140 mg/dl Final     Calcium   Date Value Ref Range Status   10/21/2024 9.6 8.4 - 10.2 mg/dL Final   07/20/2015 8.9 8.3 - 10.1 mg/dL Final     AST   Date Value Ref Range Status   10/21/2024 20 13 - 39 U/L Final   07/20/2015 29 0 - 45 U/L Final     ALT   Date Value Ref Range Status   10/21/2024 24 7 - 52 U/L Final     Comment:     Specimen collection should occur prior to Sulfasalazine  administration due to the potential for falsely depressed results.    07/20/2015 48 16 - 63 U/L Final     Alkaline Phosphatase   Date Value Ref Range Status   10/21/2024 90 34 - 104 U/L Final   07/20/2015 78 46 - 116 U/L Final     Total Protein   Date Value Ref Range Status   07/20/2015 8.0 6.4 - 8.2 g/dL Final     Total Bilirubin   Date Value Ref Range Status   07/20/2015 0.47 0.20 - 1.00 mg/dL Final     eGFR   Date Value Ref Range Status   10/21/2024 93 ml/min/1.73sq m Final        Magnesium:   Magnesium   Date Value Ref Range Status   08/20/2024 2.3 1.9 - 2.7 mg/dL Final        A1C:  Hemoglobin A1C   Date Value Ref Range Status   10/14/2024 <4.2 Normal 4.0-5.6%; PreDiabetic 5.7-6.4%; Diabetic >=6.5%; Glycemic control for adults with diabetes <7.0% % Corrected     Comment:     This is a corrected result. Previous result was 6.7 % on 10/15/2024 at 1358 EDT  This is an appended report.  These results have been appended to a previously preliminary verified report.        TSH:  TSH 3RD GENERATON   Date Value Ref Range Status   08/20/2024 1.023 0.450 - 4.500 uIU/mL Final     Comment:     Adult TSH (3rd generation) reference range follows the recommended guidelines of the [employer] Thy[employer], January, 2020.        PT/INR:  Protime   Date Value Ref Range Status   03/06/2023 15.6 (H) 11.6 - 14.5 seconds Final     INR   Date Value Ref Range Status   03/06/2023 1.25 (H) 0.84 - 1.19 Final       Lipid Panel:  Cholesterol   Date Value Ref Range Status   05/31/2024 105 See Comment mg/dL Final     Comment:     Cholesterol:         Pediatric <18 Years        Desirable          <170 mg/dL      Borderline High    170-199 mg/dL      High               >=200 mg/dL        Adult >=18 Years            Desirable        <200 mg/dL      Borderline High  200-239 mg/dL      High             >239 mg/dL       Triglycerides   Date Value Ref Range Status   05/31/2024 111 See Comment mg/dL Final     Comment:     Triglyceride:     0-9Y             <75mg/dL     10Y-17Y         <90 mg/dL       >=18Y     Normal          <150 mg/dL     Borderline High 150-199 mg/dL     High            200-499 mg/dL        Very High       >499 mg/dL    Specimen collection should occur prior to Metamizole administration due to the potential for falsely depressed results.   2015 79 mg/dL Final     Comment:     TRIGLYCERIDE:       Normal                 <150 mg/dl       Borderline High       150-199 mg/dl       High                  200-499 mg/dl       Very High             >499 mg/dl  _______________________________________       HDL   Date Value Ref Range Status   2015 59 mg/dL Final     Comment:     HDL:       High       >59 mg/dl       Low        <41 mg/dl  ______________________________       HDL, Direct   Date Value Ref Range Status   2024 29 (L) >=40 mg/dL Final     Non-HDL-Chol (CHOL-HDL)   Date Value Ref Range Status   2023 67 mg/dl Final       Troponin:  Troponin I   Date Value Ref Range Status   2021 <0.02 <=0.04 ng/mL Final     Comment:     [employer] Chemistry analyzer 99% cutoff is > 0.04 ng/mL in network labs     o cTnI 99% cutoff is useful only when applied to patients in the clinical setting of myocardial ischemia   o cTnI 99% cutoff should be interpreted in the context of clinical history, ECG findings and possibly cardiac imaging to establish correct diagnosis.   o cTnI 99% cutoff may be suggestive but clearly not indicative of a coronary event without the clinical setting of myocardial ischemia.           Imaging:    [id number]        SOILA:  No results found for this or any previous visit.      Echocardiogram:  Results for orders placed during the hospital encounter of 24    Echo complete w/ contrast if indicated    Interpretation Summary  Technically fair quality transthoracic echocardiogram study.    Mild to moderate concentric left ventricular potential, normal left ventricular systolic function with regional wall  motion variability.  Ejection fraction is estimated as around 55%.  Mild dilated right ventricle cavity, normal right ventricular systolic function.  Severe left atrial and moderate right atrial cavity enlargement.  Aortic valve leaflet thickening and sclerosis, mild aortic valve regurgitation.  Mitral valve leaflet sclerosis, mild mitral valve regurgitation.  Severe tricuspid valve regurgitation and mild pulmonic valve regurgitation.  Severe pulmonary hypertension.  Estimated RVSP/PASP is greater than 74 mmHg.  No pericardial effusion.    Compared to report of previous echocardiogram from 8/21/2023 severe tricuspid valve regurgitation and pulmonary hypertension are new.  Left ventricular ejection fraction appears to be slightly better.    Results for orders placed during the hospital encounter of 08/21/23    Echo follow up/limited w/ contrast if indicated    Interpretation Summary    Left Ventricle: Left ventricular cavity size is normal. Wall thickness is normal. The left ventricular ejection fraction is 45%. Systolic function is mildly reduced. There is mild global hypokinesis with akinesis of the basal inferior myocardial wall segment.    Left Atrium: The atrium is mildly dilated.    Right Atrium: The atrium is mildly dilated.    Aortic Valve: There is mild regurgitation.    Mitral Valve: There is mild annular calcification.    Tricuspid Valve: There is mild regurgitation.      Stress Test:   No results found for this or any previous visit.      Cardiac Catheterization:  No results found for this or any previous visit.      HOLTER MONITOR: 24 HOUR/48 HOUR MONITORS  No results found for this or any previous visit.      AMB Extended Holter Monitor: Zio XT/AT or BioTel  No results found for this or any previous visit.      Cardiac CT:    CT Cardiac w Pulmonary Vein Mapping  09/04/2024    FINDINGS:     LEFT ATRIAL APPENDAGE: Left atrial appendage is normal in size and morphology. There is layering of contrast in the  "left atrial appendage on early phase imaging which corresponds to the appearance on recent PE study. Axial 3-minute delayed images on the   current study through the left atrial appendage confirm absence of thrombus.     PULMONARY VEIN ANATOMY: Typical with two right and two left pulmonary veins.     CARDIAC STRUCTURES: Extensive coronary artery calcification noted. Pacer leads noted in the right heart.     GREAT VESSELS: Visualized thoracic aorta and central pulmonary arterial tree are within normal limits for the patient's age.     EXTRACARDIAC FINDINGS: Consolidative density in the right middle lobe consistent with reidentified pneumonia, partly imaged.     IMPRESSION:     No thrombus in the left atrial appendage.          DEVICE CHECK:     Results for orders placed or performed in visit on 09/19/24   Cardiac EP device report    Narrative    MDT BI-V ICD (DDIR) ACTIVE SYSTEM IS MRI CONDITIONAL  CARELINK TRANSMISSION: BATTERY STATUS \"6 YRS.\" AP 98% CRT PACING (BIV) 100% (LV) 0%. ALL AVAILABLE LEAD PARAMETERS WITHIN NORMAL LIMITS. NO SIGNIFICANT HIGH RATE EPISODES. VENT SENSING MARKERS NOTED. OPTI-VOL FLUID THRESHOLD CROSSED. HF/RN MADE AWARE. NORMAL DEVICE FUNCTION. NC               Code Status: [unfilled]  Advance Directive and Living Will: Yes    Power of :    POLST:      Counseling / Coordination of Care  Detailed discussion done with regards to following QT, evaluation for Watchtracy Olsen MD    "

## 2024-10-30 NOTE — PROGRESS NOTES
HPI: Patient is here with his wife.  Hematological follow-up for CLL with anemia and patient is responding to Gazyva and to that venetoclax is going to be added and he will be admitted for that to observe for tumor lysis.  His risk of tumor lysis is very low right now because Gazyva has brought lymphocyte count low.  He would like to admitted on 11/6/2024 or soon after that.  He would like to be admitted at [facility].    He was running lymphocyte count of more than 200,000 when he was recently hospitalized with pneumonia and hemoglobin was very low that he required transfusions.  Reticulocyte count is high but no evidence of acute bleed and maybe he has hemolysis.  Overall he has been feeling much better.  He had bronchoscopy recently and there was no malignancy.   He is not a good candidate for BTK inhibitors because of paroxysmal atrial fibrillation and Xarelto.  Xarelto was recently changed to Pradaxa.      Patient has tiredness and weakness and exertional dyspnea.  He has prolonged QT interval and has dual-chamber/biventricular AICD that was placed in March 2023.  Question came up of Benadryl in the premedication and his cardiologist had sent me a message and okayed the use of Benadryl.  Patient would like to have Benadryl.  He states when he received Benadryl he did not have reaction to Gazyva.   He had generalized skin rash and he thought that was from Augmentin.Rash started after he was started on Augmentin and was there before he was started on allopurinol.  Much improvement in the rash.  That showed  CLL was diagnosed in 2010 and patient had not required therapy for that until now.      Patient had iron deficiency and had GI evaluation and had intravenous Venofer.  He follows with his gastroenterologist.   In May 2021 he was found to have profound autoimmune hemolytic anemia with significant drop in hemoglobin and he was treated with prednisone.    He has enlarged prostate and nocturia and he  follows with his urologist.    He has history of QT prolongation and has a pacemaker/defibrillator and he follows with his cardiologist and electrophysiologist.    He also follows with his lung specialist for exertional dyspnea and COPD.  History of cirrhosis of liver.  He gives history of B12 deficiency and has been taking B12 orally.  He had bone marrow test in the hospital and that showed 50% infiltration with CLL.          Current Outpatient Medications:     acetaminophen (TYLENOL) 325 mg tablet, Take 2 tablets (650 mg total) by mouth every 6 (six) hours as needed for mild pain, Disp: , Rfl:     allopurinol (ZYLOPRIM) 300 mg tablet, Take 1 tablet (300 mg total) by mouth daily, Disp: 90 tablet, Rfl: 1    ascorbic acid (VITAMIN C) 250 MG CHEW, Chew 250 mg daily, Disp: , Rfl:     atorvastatin (LIPITOR) 20 mg tablet, Take 1 tablet (20 mg total) by mouth daily, Disp: 100 tablet, Rfl: 3    benzonatate (TESSALON) 200 MG capsule, Take 1 capsule (200 mg total) by mouth 3 (three) times a day as needed for cough, Disp: 30 capsule, Rfl: 1    Blood Pressure Monitoring (Omron 5 Series BP Monitor) PEE, , Disp: , Rfl:     dabigatran etexilate (PRADAXA) 150 mg capsu, Take 1 capsule (150 mg total) by mouth 2 (two) times a day, Disp: 180 capsule, Rfl: 2    furosemide (LASIX) 20 mg tablet, Take 1 tablet (20 mg total) by mouth daily, Disp: 90 tablet, Rfl: 5    Lactobacillus (PROBIOTIC ACIDOPHILUS PO), Take by mouth, Disp: , Rfl:     LORazepam (ATIVAN) 0.5 mg tablet, TAKE ONE TABLET BY MOUTH DAILY AS NEEDED for anxiety, Disp: 60 tablet, Rfl: 1    montelukast (SINGULAIR) 10 mg tablet, TAKE ONE TABLET BY MOUTH DAILY, Disp: 90 tablet, Rfl: 3    nadolol (CORGARD) 80 MG tablet, Take 1 tablet (80 mg total) by mouth daily in the early morning, Disp: 90 tablet, Rfl: 3    Omega-3 Fatty Acids (FISH OIL) 1,000 mg, Take by mouth daily, Disp: , Rfl:     omeprazole (PriLOSEC) 40 MG capsule, Take one capsule by mouth once every day., Disp: 100  capsule, Rfl: 3    predniSONE 10 mg tablet, 4 for 4 days,2 for 4 days,1 for 4 days Do not start before September 19, 2024., Disp: 28 tablet, Rfl: 0    Sodium Fluoride 5000 PPM 1.1 % PSTE, USE A PEA-SIZED AMOUNT AND BRUSH FOR 2 MINUTES ONCE DAILY. NO FOOD OR RINSING FOR 30 MINUTES., Disp: , Rfl:     Venetoclax 10 & 50 & 100 MG TBPK, Will start in hospital  Take 20 mg by mouth daily for 7 days, then 50 mg daily for 7 days, then 100 mg daily for 7 days, then 200 mg daily thereafter, Disp: 42 each, Rfl: 0    Venetoclax 100 MG TABS, Take 2 tablets (200 mg total) by mouth daily, Disp: 60 tablet, Rfl: 5    zolpidem (AMBIEN) 5 mg tablet, Take 1/2-1 full tablet at bedtime as needed for sleep.  Do not take if drinking any alcohol or taking lorazepam, Disp: 90 tablet, Rfl: 0    vitamin B-12 (CYANOCOBALAMIN) 500 MCG TABS, Take 1 tablet (500 mcg total) by mouth daily, Disp: 30 tablet, Rfl: 0    Allergies   Allergen Reactions    Ciprofloxacin Other (See Comments)     LONG QT syndrome    Codeine Other (See Comments)     agitated    Sulfamethoxazole-Trimethoprim GI Intolerance, Abdominal Pain and Other (See Comments)     upset stomach    Augmentin [Amoxicillin-Pot Clavulanate] Rash     Livedo reticularis    Macrobid [Nitrofurantoin] Dizziness, Headache and Fatigue     Per Patient       Oncology History   CLL (chronic lymphocytic leukemia) (Formerly [facility])   9/29/2017 Initial Diagnosis    CLL (chronic lymphocytic leukemia) (Formerly [facility])     9/10/2024 -  Chemotherapy    alteplase (CATHFLO), 2 mg, Intracatheter, Every 1 Minute as needed, 2 of 6 cycles  obinutuzumab (GAZYVA) day 1 IVPB, 100 mg, Intravenous, Once, 1 of 1 cycle  Administration: 100 mg (9/11/2024)  obinutuzumab (GAZYVA) day 2 titrated infusion, 900 mg, Intravenous, Once, 1 of 1 cycle  Administration: 900 mg (9/11/2024)  obinutuzumab (GAZYVA) subsequent titrated infusion, 1,000 mg, Intravenous, Once, 2 of 6 cycles  Administration: 1,000 mg (9/18/2024), 1,000 mg (9/26/2024), 1,000 mg  "(10/24/2024)         ROS:  10/30/24 Reviewed 12 systems: See symptoms in HPI  Presently no other neurological, cardiac, pulmonary, GI and  symptoms other than mentioned in HPI.  Other symptoms are in HPI. No symptoms like fever, chills, bleeding, bone pains,  night sweats,   numbness,  claudication and gait problem. No frequent infections except recent pneumonia.  Not unusually sensitive to heat or cold. No swelling of the ankles. No swollen glands.  Patient is anxious.       /70 (BP Location: Right arm, Patient Position: Sitting, Cuff Size: Adult)   Pulse 85   Temp (!) 97 °F (36.1 °C) (Temporal)   Resp 18   Ht 5' 7\" (1.702 m)   Wt 87.8 kg (193 lb 8 oz)   SpO2 99%   BMI 30.31 kg/m²     Physical Exam:  Alert and oriented and not in distress.  Vitals are above.  No icterus.  No oral thrush.  No palpable neck mass.  Clear lung fields.  Prolonged expiration.  Irregular heart rate.  AICD.  No palpable abdominal mass.  Soft and nontender abdomen.  No ascites.  No edema of ankles.  No calf tenderness.  Patient  has generalized weakness.  No focal neurological deficit.  Much improved skin rash that is mostly on the legs.  There is no palpable lymphadenopathy in the neck and axillary areas, no clubbing.    Patient is anxious.  Performance status 2.    IMAGING:  MPRESSION:     Mild hepatomegaly.. Possible 2 mm hemangioma.  Moderate splenomegaly.  Gallstone in the gallbladder.              Workstation performed: [id number]        Imaging    US abdomen complete (Order: [id number]) - 9/3/2024    IMPRESSION:     No pulmonary embolus.     Right middle lobe pneumonia.     Lack of opacification of the left atrial appendage. It cannot be determined if this is due to nonopacified blood in the nondependent portion of the left atrium or left atrial appendage thrombus.     Mild splenomegaly and minimally enlarged right axillary node, likely due to the patient's history of CLL.     The study was marked in EPIC for immediate " notification.           Workstation performed: [id number]        Imaging    CTA ED chest PE study (Order: [id number]) - 9/2/2024  LABS:    Results for orders placed or performed in visit on 10/28/24   CBC and differential    Collection Time: 10/28/24  7:55 AM   Result Value Ref Range    WBC 10.06 4.31 - 10.16 Thousand/uL    RBC 2.85 (L) 3.88 - 5.62 Million/uL    Hemoglobin 10.5 (L) 12.0 - 17.0 g/dL    Hematocrit 30.9 (L) 36.5 - 49.3 %     (H) 82 - 98 fL    MCH 36.8 (H) 26.8 - 34.3 pg    MCHC 34.0 31.4 - 37.4 g/dL    RDW 19.7 (H) 11.6 - 15.1 %    MPV 8.9 8.9 - 12.7 fL    Platelets 327 149 - 390 Thousands/uL    nRBC 0 /100 WBCs    Segmented % 72 43 - 75 %    Immature Grans % 2 0 - 2 %    Lymphocytes % 17 14 - 44 %    Monocytes % 8 4 - 12 %    Eosinophils Relative 1 0 - 6 %    Basophils Relative 0 0 - 1 %    Absolute Neutrophils 7.24 1.85 - 7.62 Thousands/µL    Absolute Immature Grans 0.19 0.00 - 0.20 Thousand/uL    Absolute Lymphocytes 1.72 0.60 - 4.47 Thousands/µL    Absolute Monocytes 0.76 0.17 - 1.22 Thousand/µL    Eosinophils Absolute 0.11 0.00 - 0.61 Thousand/µL    Basophils Absolute 0.04 0.00 - 0.10 Thousands/µL   Reticulocytes    Collection Time: 10/28/24  7:55 AM   Result Value Ref Range    Retic Ct Abs 296,100 (H) 14,356 - 105,094    Retic Ct Pct 10.39 (H) 0.37 - 1.87 %   LD,Blood    Collection Time: 10/28/24  7:55 AM   Result Value Ref Range     (H) 140 - 271 U/L     *Note: Due to a large number of results and/or encounters for the requested time period, some results have not been displayed. A complete set of results can be found in Results Review.         Component 9/16/24  8:36 AM   DIRECT BEBETO Negative              Specimen Collected: 09/16/24  8:36 AM Last Resulted: 09/16/24  2:42 PM               Component  Ref Range & Units 9/16/24  8:36 AM   Beta-2 Microglobulin  0.6 - 2.4 mg/L 3.7 High                 Component  Ref Range & Units 9/16/24  8:36 AM 9/12/24  5:15 AM 9/11/24  5:08 AM 5/31/24   8:13 AM 2/20/24  8:04 AM   Uric Acid  3.5 - 8.5 mg/dL 4.2 5.1 CM 7.4 CM 5.9 CM 6.7 CM   Comment: Specimen collection should occur prior to Metamizole administration due to the potential for falsely depressed results.                  Component  Ref Range & Units 9/16/24  8:36 AM 9/12/24  5:15 AM 9/11/24  5:08 AM 9/4/24  4:38 AM 9/3/24  4:25 AM 5/31/24  8:13 AM 2/20/24  8:04 AM   LD  140 - 271 U/L 368 High  234 280 High  234 254 173 162                         Component  Ref Range & Units 9/16/24  8:36 AM 5/31/24  8:13 AM 2/20/24  8:04 AM   IGA  66 - 433 mg/dL 387 422 502 High    IGG  635 - 1,741 mg/dL 965 1,025 1,138   IGM  45 - 281 mg/dL 41 Low  46 77                 Comprehensive metabolic panel  Status: Final result      Contains abnormal data Comprehensive metabolic panel  Order: [id number]   Status: Final result       Visible to patient: Yes (seen)       Next appt: 09/23/2024 at 02:40 PM in Dermatology (Coleman Hawthorne MD)       Dx: Ventricular fibrillation (HCC); Hyper...    1 Result Note       1 Patient Communication            Component  Ref Range & Units 9/16/24  8:36 AM 9/12/24  5:15 AM 9/11/24  5:08 AM 9/10/24  4:47 AM 9/9/24  4:21 AM 9/8/24  4:55 AM 9/7/24  5:39 AM   Sodium  135 - 147 mmol/L 131 Low  133 Low  132 Low  132 Low  133 Low  133 Low  130 Low    Potassium  3.5 - 5.3 mmol/L 4.3 4.2 4.2 4.1 4.7 4.6 5.2   Chloride  96 - 108 mmol/L 96 99 99 98 97 99 98   CO2  21 - 32 mmol/L 27 29 27 29 31 29 28   ANION GAP  4 - 13 mmol/L 8 5 6 5 5 5 4   BUN  5 - 25 mg/dL 14 16 20 21 20 22 24   Creatinine  0.60 - 1.30 mg/dL 0.65 0.65 CM 0.64 CM 0.63 CM 0.72 CM 0.77 CM 0.86 CM   Comment: Standardized to IDMS reference method   Glucose, Fasting  65 - 99 mg/dL 111 High          Calcium  8.4 - 10.2 mg/dL 8.9 7.9 Low  7.9 Low  7.8 Low  8.1 Low  7.9 Low  8.7   AST  13 - 39 U/L 34         ALT  7 - 52 U/L 72 High          Comment: Specimen collection should occur prior to Sulfasalazine administration due to the  potential for falsely depressed results.   Alkaline Phosphatase  34 - 104 U/L 99         Total Protein  6.4 - 8.4 g/dL 7.2         Albumin  3.5 - 5.0 g/dL 4.0         Total Bilirubin  0.20 - 1.00 mg/dL 3.01 High          Comment: Use of this assay is not recommended for patients undergoing treatment with eltrombopag due to the potential for falsely elevated results.  N-acetyl-p-benzoquinone imine (metabolite of Acetaminophen) will generate erroneously low results in samples for patients that have taken an overdose of Acetaminophen.   eGFR  ml/min/1.73sq m 97 97 98 99 93 91 87                               Labs, Imaging, & Other studies:   All pertinent labs and imaging studies were personally reviewed        Reviewed hospital records and discussed with patient.  Assessment and plan: See diagnoses, orders and instructions below.  Patient is here with his wife.  Hematological follow-up for CLL with anemia and patient is responding to Gazyva and to that venetoclax is going to be added and he will be admitted for that to observe for tumor lysis.  His risk of tumor lysis is very low right now because Gazyva has brought lymphocyte count low.  He would like to admitted on 11/6/2024 or soon after that.  He would like to be admitted at [facility].    He was running lymphocyte count of more than 200,000 when he was recently hospitalized with pneumonia and hemoglobin was very low that he required transfusions.  Reticulocyte count is high but no evidence of acute bleed and maybe he has hemolysis.  Overall he has been feeling much better.  He had bronchoscopy recently and there was no malignancy.   He is not a good candidate for BTK inhibitors because of paroxysmal atrial fibrillation and Xarelto.  Xarelto was recently changed to Pradaxa.      Patient has tiredness and weakness and exertional dyspnea.  He has prolonged QT interval and has dual-chamber/biventricular AICD that was placed in March 2023.  Question came up of  Benadryl in the premedication and his cardiologist had sent me a message and okayed the use of Benadryl.  Patient would like to have Benadryl.  He states when he received Benadryl he did not have reaction to Gazyva.   He had generalized skin rash and he thought that was from Augmentin.Rash started after he was started on Augmentin and was there before he was started on allopurinol.  Much improvement in the rash.  That showed  CLL was diagnosed in 2010 and patient had not required therapy for that until now.      Patient had iron deficiency and had GI evaluation and had intravenous Venofer.  He follows with his gastroenterologist.   In May 2021 he was found to have profound autoimmune hemolytic anemia with significant drop in hemoglobin and he was treated with prednisone.    He has enlarged prostate and nocturia and he follows with his urologist.    He has history of QT prolongation and has a pacemaker/defibrillator and he follows with his cardiologist and electrophysiologist.    He also follows with his lung specialist for exertional dyspnea and COPD.  History of cirrhosis of liver.  He gives history of B12 deficiency and has been taking B12 orally.  He had bone marrow test in the hospital and that showed 50% infiltration with CLL    Physical examination and test results are as recorded and discussed in detail.    Continuing on Gazyva and to that venetoclax will be added .  Patient had signed informed consent for these 2 drugs.  He has venetoclax at home and he will bring with him to the hospital.  Starting dose will be 20 mg daily and maximum dose will be 200 mg daily and it is a ramp-up program.  Patient will need close monitoring for tumor lysis.  He will have nephrology consultation.  Goal is to manage CLL with  anemia and prolongation of survival.  Patient is capable of self-care.  Diet and activities per patient's primary physician and other consultants especially cardiologists.  Patient to avoid falls and  trauma.  All discussed in detail.  Questions answered.  Discussed health screening tests later on.    1. CLL (chronic lymphocytic leukemia) (HCC)    - CBC and differential; Standing  - Comprehensive metabolic panel; Standing  - Uric acid; Standing  - LD,Blood; Standing    2. Hepatosplenomegaly      3. Paroxysmal A-fib (HCC)      4. Autoimmune hemolytic anemia (HCC)      5. Chronic combined systolic and diastolic congestive heart failure (HCC)      6. BPH with obstruction/lower urinary tract symptoms      7. Pulmonary hypertension (HCC)      8. Pacemaker      9. Hyperbilirubinemia      10. Arthritis      11. Anemia, unspecified type      12. Alcoholic cirrhosis of liver without ascites (HCC)      13. Vitamin B 12 deficiency    Ad[facility] to [facility] under [facility] service for venetoclax for CLL and risk of tumor lysis syndrome.  Patient will bring venetoclax from home.  Blood work every week.  Follow-up in 3 weeks.    I went over all the details with the patient about the treatment program.     .  Patient will continue to follow with  primary physician and other consultants.  Patient  voiced understanding and agrees      Disclaimer: This document was prepared using a dictation device.  If a word or phrase is confusing, or does not make sense, this is likely due to recognition error which was not discovered during the providers review. If you believe an error has occurred, please Contact me through [facility] service for servando?cation.    Counseling / Coordination of Care   .  Provided counseling and support

## 2024-10-31 ENCOUNTER — OFFICE VISIT (OUTPATIENT)
Dept: CARDIOLOGY CLINIC | Facility: CLINIC | Age: 74
End: 2024-10-31
Payer: MEDICARE

## 2024-10-31 VITALS
BODY MASS INDEX: 29.28 KG/M2 | HEIGHT: 68 IN | WEIGHT: 193.2 LBS | HEART RATE: 70 BPM | DIASTOLIC BLOOD PRESSURE: 60 MMHG | SYSTOLIC BLOOD PRESSURE: 110 MMHG

## 2024-10-31 DIAGNOSIS — I10 ESSENTIAL HYPERTENSION: Chronic | ICD-10-CM

## 2024-10-31 DIAGNOSIS — C91.10 CLL (CHRONIC LYMPHOCYTIC LEUKEMIA) (HCC): Chronic | ICD-10-CM

## 2024-10-31 DIAGNOSIS — I50.42 CHRONIC COMBINED SYSTOLIC AND DIASTOLIC CONGESTIVE HEART FAILURE (HCC): ICD-10-CM

## 2024-10-31 DIAGNOSIS — K70.30 ALCOHOLIC CIRRHOSIS OF LIVER WITHOUT ASCITES (HCC): ICD-10-CM

## 2024-10-31 DIAGNOSIS — D59.10 AUTOIMMUNE HEMOLYTIC ANEMIA (HCC): ICD-10-CM

## 2024-10-31 DIAGNOSIS — Z95.0 PACEMAKER: ICD-10-CM

## 2024-10-31 DIAGNOSIS — I45.81 LONG QT SYNDROME TYPE 2: ICD-10-CM

## 2024-10-31 DIAGNOSIS — I47.19 NARROW COMPLEX TACHYCARDIA (HCC): ICD-10-CM

## 2024-10-31 DIAGNOSIS — I27.20 PULMONARY HYPERTENSION (HCC): Chronic | ICD-10-CM

## 2024-10-31 DIAGNOSIS — I48.0 PAROXYSMAL A-FIB (HCC): ICD-10-CM

## 2024-10-31 LAB
BACTERIA BRONCH AEROBE CULT: NORMAL
BACTERIA BRONCH AEROBE CULT: NORMAL
GRAM STN SPEC: NORMAL

## 2024-10-31 PROCEDURE — 93000 ELECTROCARDIOGRAM COMPLETE: CPT | Performed by: INTERNAL MEDICINE

## 2024-10-31 PROCEDURE — 99214 OFFICE O/P EST MOD 30 MIN: CPT | Performed by: INTERNAL MEDICINE

## 2024-10-31 RX ORDER — DABIGATRAN ETEXILATE 150 MG/1
150 CAPSULE ORAL 2 TIMES DAILY
Qty: 180 CAPSULE | Refills: 3 | Status: SHIPPED | OUTPATIENT
Start: 2024-10-31

## 2024-11-01 ENCOUNTER — OFFICE VISIT (OUTPATIENT)
Dept: FAMILY MEDICINE CLINIC | Facility: CLINIC | Age: 74
End: 2024-11-01
Payer: MEDICARE

## 2024-11-01 VITALS
BODY MASS INDEX: 29.4 KG/M2 | DIASTOLIC BLOOD PRESSURE: 70 MMHG | RESPIRATION RATE: 12 BRPM | SYSTOLIC BLOOD PRESSURE: 122 MMHG | HEIGHT: 68 IN | WEIGHT: 194 LBS | HEART RATE: 70 BPM

## 2024-11-01 DIAGNOSIS — Z23 ENCOUNTER FOR IMMUNIZATION: ICD-10-CM

## 2024-11-01 DIAGNOSIS — D59.10 AUTOIMMUNE HEMOLYTIC ANEMIA (HCC): ICD-10-CM

## 2024-11-01 DIAGNOSIS — R73.9 HYPERGLYCEMIA: ICD-10-CM

## 2024-11-01 DIAGNOSIS — Z23 NEED FOR COVID-19 VACCINE: ICD-10-CM

## 2024-11-01 DIAGNOSIS — I47.19 NARROW COMPLEX TACHYCARDIA (HCC): ICD-10-CM

## 2024-11-01 DIAGNOSIS — I10 ESSENTIAL HYPERTENSION: Chronic | ICD-10-CM

## 2024-11-01 DIAGNOSIS — Z00.00 MEDICARE ANNUAL WELLNESS VISIT, SUBSEQUENT: Primary | ICD-10-CM

## 2024-11-01 DIAGNOSIS — I27.20 PULMONARY HYPERTENSION (HCC): Chronic | ICD-10-CM

## 2024-11-01 DIAGNOSIS — E87.1 HYPONATREMIA: ICD-10-CM

## 2024-11-01 DIAGNOSIS — F33.9 DEPRESSION, RECURRENT (HCC): ICD-10-CM

## 2024-11-01 DIAGNOSIS — I48.0 PAROXYSMAL A-FIB (HCC): ICD-10-CM

## 2024-11-01 DIAGNOSIS — K21.9 GASTROESOPHAGEAL REFLUX DISEASE WITHOUT ESOPHAGITIS: ICD-10-CM

## 2024-11-01 DIAGNOSIS — R23.1 LIVEDO RETICULARIS: ICD-10-CM

## 2024-11-01 DIAGNOSIS — I50.42 CHRONIC COMBINED SYSTOLIC AND DIASTOLIC CONGESTIVE HEART FAILURE (HCC): ICD-10-CM

## 2024-11-01 DIAGNOSIS — E78.00 HYPERCHOLESTEROLEMIA: Chronic | ICD-10-CM

## 2024-11-01 DIAGNOSIS — E80.6 HYPERBILIRUBINEMIA: ICD-10-CM

## 2024-11-01 PROBLEM — K70.30 ALCOHOLIC CIRRHOSIS OF LIVER WITHOUT ASCITES (HCC): Status: RESOLVED | Noted: 2024-10-29 | Resolved: 2024-11-01

## 2024-11-01 PROCEDURE — 99214 OFFICE O/P EST MOD 30 MIN: CPT | Performed by: FAMILY MEDICINE

## 2024-11-01 PROCEDURE — G0008 ADMIN INFLUENZA VIRUS VAC: HCPCS | Performed by: FAMILY MEDICINE

## 2024-11-01 PROCEDURE — G0439 PPPS, SUBSEQ VISIT: HCPCS | Performed by: FAMILY MEDICINE

## 2024-11-01 PROCEDURE — 90662 IIV NO PRSV INCREASED AG IM: CPT | Performed by: FAMILY MEDICINE

## 2024-11-01 PROCEDURE — 90480 ADMN SARSCOV2 VAC 1/ONLY CMP: CPT | Performed by: FAMILY MEDICINE

## 2024-11-01 PROCEDURE — 91320 SARSCV2 VAC 30MCG TRS-SUC IM: CPT | Performed by: FAMILY MEDICINE

## 2024-11-01 NOTE — PROGRESS NOTES
Ambulatory Visit  Name: Murphy Willams      : 1950      MRN: [id number]  Encounter Provider: Pravin Ortega Jr, MD  Encounter Date: 2024   Encounter department: [facility]IMARY [facility]    Assessment & Plan  Medicare annual wellness visit, subsequent  He requested COVID-vaccines today which were given.       Chronic combined systolic and diastolic congestive heart failure (HCC)  Wt Readings from Last 3 Encounters:   24 88 kg (194 lb)   10/31/24 87.6 kg (193 lb 3.2 oz)   10/29/24 87.8 kg (193 lb 8 oz)     He appears to be doing quite well with current regimen.  He does not show any signs of fluid overload.  Continue close follow-up with myself and cardiology.               Gastroesophageal reflux disease without esophagitis  Reflux stable at this time.       Encounter for immunization    Orders:    influenza vaccine, high-dose, PF 0.5 mL (Fluzone High Dose)    Need for COVID-19 vaccine    Orders:    COVID-19 Pfizer mRNA vaccine 12 yr and older (Comirnaty pre-filled syringe)    Essential hypertension  Blood pressure is well-controlled.  Orders:    Lipid Panel with Direct LDL reflex; Future    Livedo reticularis  Fortunately, his rash does seem to be calming down.  He is debating getting another dermatologist as Dr. Hawthorne has moved out of the area.  At this time, I do not believe anything is urgent as he is extremely comfortable.       Depression, recurrent (HCC)  Depression Screening Follow-up Plan: Patient's depression screening was positive with a PHQ-9 score of 7. Patient with underlying depression and was advised to continue current medications as prescribed.  He is doing well at this time.  He is taking lorazepam on occasion.       Autoimmune hemolytic anemia (HCC)  Continue close follow-up with hematology       Hyperglycemia  Monitor A1c routinely  Orders:    Lipid Panel with Direct LDL reflex; Future    Hyperbilirubinemia  Continue routine monitoring of CMP  Orders:     Lipid Panel with Direct LDL reflex; Future    Hypercholesterolemia  Check lipids  Orders:    Lipid Panel with Direct LDL reflex; Future    Hyponatremia  Monitor labs.  Hematology already has placed labs and I will monitor.       Narrow complex tachycardia (HCC)  Continue close follow-up with cardiology.  He is on Pradaxa as well.       Paroxysmal A-fib (HCC)  He does appear to be in sinus rhythm today.       Pulmonary hypertension (HCC)  He remains clinically stable.          Preventive health issues were discussed with patient, and age appropriate screening tests were ordered as noted in patient's After Visit Summary. Personalized health advice and appropriate referrals for health education or preventive services given if needed, as noted in patient's After Visit Summary.    History of Present Illness     HPI patient presents today for follow-up of chronic health issues.  Overall, he seems to be doing pretty well.  He has significant cardiac disease but remains stable.  He denies any current probs of chest pain, shortness of breath or palpitations.  He said no lower extremity swelling or orthopnea.  He does remain relatively active but is not exercising routinely.  Reflux is stable.  He has a history of alcohol abuse but rarely drinks at this point.  He is following closely with heme-onc for his CLL which appears to be doing quite well based on recent labs.  Patient Care Team:  Pravin Castaneda Jr., MD as PCP - General  MD David Ortega MD as Medical Oncologist (Hematology and Oncology)    Review of Systems   Constitutional:  Positive for fatigue. Negative for appetite change, chills, fever and unexpected weight change.   HENT:  Negative for trouble swallowing.    Eyes:  Negative for visual disturbance.   Respiratory:  Negative for cough, chest tightness, shortness of breath and wheezing.    Cardiovascular:  Negative for chest pain, palpitations and leg swelling.   Gastrointestinal:  Negative for  abdominal distention, abdominal pain, blood in stool, constipation and diarrhea.   Endocrine: Negative for polyuria.   Genitourinary:  Negative for difficulty urinating and flank pain.   Musculoskeletal:  Positive for arthralgias. Negative for myalgias.   Skin:  Negative for rash.   Neurological:  Negative for dizziness and light-headedness.   Hematological:  Negative for adenopathy. Does not bruise/bleed easily.   Psychiatric/Behavioral:  Negative for dysphoric mood and sleep disturbance. The patient is not nervous/anxious.      Medical History Reviewed by provider this encounter:  Tobacco  Allergies  Meds  Problems  Med Hx  Surg Hx  Fam Hx       Annual Wellness Visit Questionnaire   Murphy is here for his Subsequent Wellness visit.     Health Risk Assessment:   Patient rates overall health as fair. Patient feels that their physical health rating is slightly worse. Patient is satisfied with their life. Eyesight was rated as same. Hearing was rated as same. Patient feels that their emotional and mental health rating is same. Patients states they are sometimes angry. Patient states they are always unusually tired/fatigued. Pain experienced in the last 7 days has been none. Patient states that he has experienced no weight loss or gain in last 6 months.     Depression Screening:   PHQ-9 Score: 7      Fall Risk Screening:   In the past year, patient has experienced: no history of falling in past year      Home Safety:  Patient does not have trouble with stairs inside or outside of their home. Patient has working smoke alarms and has working carbon monoxide detector. Home safety hazards include: none.     Nutrition:   Current diet is Regular.     Medications:   Patient is currently taking over-the-counter supplements. OTC medications include: see medication list. Patient is able to manage medications.     Activities of Daily Living (ADLs)/Instrumental Activities of Daily Living (IADLs):   Walk and transfer into and  out of bed and chair?: Yes  Dress and groom yourself?: Yes    Bathe or shower yourself?: Yes    Feed yourself? Yes  Do your laundry/housekeeping?: Yes  Manage your money, pay your bills and track your expenses?: Yes  Make your own meals?: Yes    Do your own shopping?: Yes    Previous Hospitalizations:   Any hospitalizations or ED visits within the last 12 months?: Yes      Advance Care Planning:   Living will: Yes    Durable POA for healthcare: Yes    Advanced directive: Yes    Advanced directive counseling given: Yes    End of Life Decisions reviewed with patient: Yes      Cognitive Screening:   Provider or family/friend/caregiver concerned regarding cognition?: No    PREVENTIVE SCREENINGS      Cardiovascular Screening:    General: Screening Not Indicated and History Lipid Disorder      Diabetes Screening:     General: Screening Current      Colorectal Cancer Screening:     General: Screening Current      Prostate Cancer Screening:    General: Screening Current      Abdominal Aortic Aneurysm (AAA) Screening:    Risk factors include: age between 65-74 yo and tobacco use        Lung Cancer Screening:     General: Screening Not Indicated      Hepatitis C Screening:    General: Screening Current    Screening, Brief Intervention, and Referral to Treatment (SBIRT)    Screening  Typical number of drinks in a day: 3  Typical number of drinks in a week: 3  Interpretation: Low risk drinking behavior.    Single Item Drug Screening:  How often have you used an illegal drug (including marijuana) or a prescription medication for non-medical reasons in the past year? never    Single Item Drug Screen Score: 0  Interpretation: Negative screen for possible drug use disorder    Social Determinants of Health     Financial Resource Strain: Low Risk  (10/23/2023)    Overall Financial Resource Strain (CARDIA)     Difficulty of Paying Living Expenses: Not hard at all   Food Insecurity: No Food Insecurity (11/1/2024)    Hunger Vital Sign  "    Worried About Running Out of Food in the Last Year: Never true     Ran Out of Food in the Last Year: Never true   Transportation Needs: No Transportation Needs (11/1/2024)    PRAPARE - Transportation     Lack of Transportation (Medical): No     Lack of Transportation (Non-Medical): No   Housing Stability: Low Risk  (11/1/2024)    Housing Stability Vital Sign     Unable to Pay for Housing in the Last Year: No     Number of Times Moved in the Last Year: 0     Homeless in the Last Year: No   Utilities: Not At Risk (11/1/2024)    [facility] Utilities     Threatened with loss of utilities: No     No results found.    Objective     /70 (BP Location: Left arm, Patient Position: Sitting, Cuff Size: Standard)   Resp 12   Ht 5' 8\" (1.727 m)   Wt 88 kg (194 lb)   BMI 29.50 kg/m²     Physical Exam  Constitutional:       General: He is not in acute distress.     Appearance: Normal appearance. He is well-developed. He is obese. He is not diaphoretic.   HENT:      Head: Normocephalic.      Right Ear: External ear normal.      Left Ear: External ear normal.      Nose: Nose normal.   Eyes:      General:         Right eye: No discharge.         Left eye: No discharge.      Conjunctiva/sclera: Conjunctivae normal.      Pupils: Pupils are equal, round, and reactive to light.   Neck:      Thyroid: No thyromegaly.      Trachea: No tracheal deviation.   Cardiovascular:      Rate and Rhythm: Normal rate and regular rhythm.      Heart sounds: Normal heart sounds. No murmur heard.     No friction rub.   Pulmonary:      Effort: Pulmonary effort is normal. No respiratory distress.      Breath sounds: Normal breath sounds. No wheezing.   Chest:      Chest wall: No tenderness.   Abdominal:      General: There is no distension.      Palpations: There is no mass.      Tenderness: There is no abdominal tenderness. There is no guarding or rebound.      Hernia: No hernia is present.   Musculoskeletal:         General: No swelling or deformity. "      Cervical back: Normal range of motion.      Right lower leg: No edema.      Left lower leg: No edema.   Skin:     Findings: No erythema or rash.   Neurological:      General: No focal deficit present.      Mental Status: He is alert.      Cranial Nerves: No cranial nerve deficit.      Coordination: Coordination normal.   Psychiatric:         Thought Content: Thought content normal.

## 2024-11-01 NOTE — ASSESSMENT & PLAN NOTE
Wt Readings from Last 3 Encounters:   11/01/24 88 kg (194 lb)   10/31/24 87.6 kg (193 lb 3.2 oz)   10/29/24 87.8 kg (193 lb 8 oz)     He appears to be doing quite well with current regimen.  He does not show any signs of fluid overload.  Continue close follow-up with myself and cardiology.

## 2024-11-01 NOTE — ASSESSMENT & PLAN NOTE
Fortunately, his rash does seem to be calming down.  He is debating getting another dermatologist as Dr. Hawthorne has moved out of the area.  At this time, I do not believe anything is urgent as he is extremely comfortable.

## 2024-11-01 NOTE — ASSESSMENT & PLAN NOTE
Depression Screening Follow-up Plan: Patient's depression screening was positive with a PHQ-9 score of 7. Patient with underlying depression and was advised to continue current medications as prescribed.  He is doing well at this time.  He is taking lorazepam on occasion.

## 2024-11-04 ENCOUNTER — APPOINTMENT (OUTPATIENT)
Dept: LAB | Facility: MEDICAL CENTER | Age: 74
DRG: 847 | End: 2024-11-04
Payer: MEDICARE

## 2024-11-04 ENCOUNTER — TELEPHONE (OUTPATIENT)
Dept: ADMINISTRATIVE | Facility: OTHER | Age: 74
End: 2024-11-04

## 2024-11-04 DIAGNOSIS — I45.81 LONG QT SYNDROME TYPE 2: ICD-10-CM

## 2024-11-04 DIAGNOSIS — E80.6 HYPERBILIRUBINEMIA: ICD-10-CM

## 2024-11-04 DIAGNOSIS — R73.9 HYPERGLYCEMIA: ICD-10-CM

## 2024-11-04 DIAGNOSIS — I48.0 PAROXYSMAL A-FIB (HCC): ICD-10-CM

## 2024-11-04 DIAGNOSIS — F33.9 DEPRESSION, RECURRENT (HCC): ICD-10-CM

## 2024-11-04 DIAGNOSIS — E78.00 HYPERCHOLESTEROLEMIA: Chronic | ICD-10-CM

## 2024-11-04 DIAGNOSIS — I10 ESSENTIAL HYPERTENSION: Chronic | ICD-10-CM

## 2024-11-04 DIAGNOSIS — C91.10 CLL (CHRONIC LYMPHOCYTIC LEUKEMIA) (HCC): ICD-10-CM

## 2024-11-04 LAB
ALBUMIN SERPL BCG-MCNC: 4.5 G/DL (ref 3.5–5)
ALP SERPL-CCNC: 97 U/L (ref 34–104)
ALT SERPL W P-5'-P-CCNC: 21 U/L (ref 7–52)
ANION GAP SERPL CALCULATED.3IONS-SCNC: 10 MMOL/L (ref 4–13)
AST SERPL W P-5'-P-CCNC: 26 U/L (ref 13–39)
BASOPHILS # BLD AUTO: 0.04 THOUSANDS/ÂΜL (ref 0–0.1)
BASOPHILS NFR BLD AUTO: 1 % (ref 0–1)
BILIRUB SERPL-MCNC: 1.42 MG/DL (ref 0.2–1)
BUN SERPL-MCNC: 12 MG/DL (ref 5–25)
CALCIUM SERPL-MCNC: 9.2 MG/DL (ref 8.4–10.2)
CHLORIDE SERPL-SCNC: 99 MMOL/L (ref 96–108)
CHOLEST SERPL-MCNC: 126 MG/DL
CO2 SERPL-SCNC: 29 MMOL/L (ref 21–32)
CREAT SERPL-MCNC: 0.73 MG/DL (ref 0.6–1.3)
EOSINOPHIL # BLD AUTO: 0.14 THOUSAND/ÂΜL (ref 0–0.61)
EOSINOPHIL NFR BLD AUTO: 2 % (ref 0–6)
ERYTHROCYTE [DISTWIDTH] IN BLOOD BY AUTOMATED COUNT: 18.6 % (ref 11.6–15.1)
FUNGUS SPEC CULT: NORMAL
GFR SERPL CREATININE-BSD FRML MDRD: 92 ML/MIN/1.73SQ M
GLUCOSE P FAST SERPL-MCNC: 113 MG/DL (ref 65–99)
HCT VFR BLD AUTO: 31.8 % (ref 36.5–49.3)
HDLC SERPL-MCNC: 35 MG/DL
HGB BLD-MCNC: 10.9 G/DL (ref 12–17)
IMM GRANULOCYTES # BLD AUTO: 0.08 THOUSAND/UL (ref 0–0.2)
IMM GRANULOCYTES NFR BLD AUTO: 1 % (ref 0–2)
LDH SERPL-CCNC: 319 U/L (ref 140–271)
LDLC SERPL CALC-MCNC: 66 MG/DL (ref 0–100)
LYMPHOCYTES # BLD AUTO: 1.07 THOUSANDS/ÂΜL (ref 0.6–4.47)
LYMPHOCYTES NFR BLD AUTO: 13 % (ref 14–44)
MCH RBC QN AUTO: 35.9 PG (ref 26.8–34.3)
MCHC RBC AUTO-ENTMCNC: 34.3 G/DL (ref 31.4–37.4)
MCV RBC AUTO: 105 FL (ref 82–98)
MONOCYTES # BLD AUTO: 1 THOUSAND/ÂΜL (ref 0.17–1.22)
MONOCYTES NFR BLD AUTO: 13 % (ref 4–12)
NEUTROPHILS # BLD AUTO: 5.67 THOUSANDS/ÂΜL (ref 1.85–7.62)
NEUTS SEG NFR BLD AUTO: 70 % (ref 43–75)
NRBC BLD AUTO-RTO: 0 /100 WBCS
PLATELET # BLD AUTO: 243 THOUSANDS/UL (ref 149–390)
PMV BLD AUTO: 9.3 FL (ref 8.9–12.7)
POTASSIUM SERPL-SCNC: 4.4 MMOL/L (ref 3.5–5.3)
PROT SERPL-MCNC: 7.4 G/DL (ref 6.4–8.4)
RBC # BLD AUTO: 3.04 MILLION/UL (ref 3.88–5.62)
SODIUM SERPL-SCNC: 138 MMOL/L (ref 135–147)
TRIGL SERPL-MCNC: 123 MG/DL
URATE SERPL-MCNC: 4.3 MG/DL (ref 3.5–8.5)
WBC # BLD AUTO: 8 THOUSAND/UL (ref 4.31–10.16)

## 2024-11-04 PROCEDURE — 83615 LACTATE (LD) (LDH) ENZYME: CPT

## 2024-11-04 PROCEDURE — 85025 COMPLETE CBC W/AUTO DIFF WBC: CPT

## 2024-11-04 PROCEDURE — 84550 ASSAY OF BLOOD/URIC ACID: CPT

## 2024-11-04 PROCEDURE — 80053 COMPREHEN METABOLIC PANEL: CPT

## 2024-11-04 PROCEDURE — 36415 COLL VENOUS BLD VENIPUNCTURE: CPT

## 2024-11-04 PROCEDURE — 80061 LIPID PANEL: CPT

## 2024-11-04 NOTE — TELEPHONE ENCOUNTER
Upon review of the In Basket request and the patient's chart, initial outreach has been made via fax to facility. Please see Contacts section for details.     Thank you  Lexii Luong MA

## 2024-11-04 NOTE — LETTER
Diabetic Eye Exam Form    Date Requested: 24  Patient: Murphy Willams  Patient : 1950   Referring Provider: Pravin Ortega Jr, MD      DIABETIC Eye Exam Date _______________________________      Type of Exam MUST be documented for Diabetic Eye Exams. Please CHECK ONE.     Retinal Exam       Dilated Retinal Exam       OCT       Optomap-Iris Exam      Fundus Photography       Left Eye - Please check Retinopathy or No Retinopathy        Exam did show retinopathy    Exam did not show retinopathy       Right Eye - Please check Retinopathy or No Retinopathy       Exam did show retinopathy    Exam did not show retinopathy       Comments __________________________________________________________    Practice Providing Exam ______________________________________________    Exam Performed By (print name) _______________________________________      Provider Signature ___________________________________________________      These reports are needed for  compliance.  Please fax this completed form and a copy of the Diabetic Eye Exam report to our office located at [address] [place] [address] as soon as possible via Fax [phone] attention Lexii: Phone [phone]  We thank you for your assistance in treating our mutual patient.

## 2024-11-05 ENCOUNTER — TELEPHONE (OUTPATIENT)
Dept: HEMATOLOGY ONCOLOGY | Facility: CLINIC | Age: 74
End: 2024-11-05

## 2024-11-05 NOTE — TELEPHONE ENCOUNTER
Received a phone call from Murphy, stating that he would like transportation set up both to the hospital tomorrow and for when he is discharged. He said he was cautioned by Dr. Hanley that it would be a good idea for him not to drive. He also wants to know what time he needs to be at the hospital for admission. He is requesting a phone call with the arrangements on his cell phone as soon as the arrangements are made.

## 2024-11-05 NOTE — TELEPHONE ENCOUNTER
Left VM for patient to make him aware that he would be able to drive to and from the hospital, as long as he is stable at d/c from the inpatient [facility]. He will be in the hospital for at least 2 over nights.

## 2024-11-06 ENCOUNTER — HOSPITAL ENCOUNTER (INPATIENT)
Facility: HOSPITAL | Age: 74
LOS: 1 days | Discharge: HOME | DRG: 847 | End: 2024-11-07
Attending: INTERNAL MEDICINE | Admitting: INTERNAL MEDICINE
Payer: MEDICARE

## 2024-11-06 DIAGNOSIS — C91.10 CLL (CHRONIC LYMPHOCYTIC LEUKEMIA) (HCC): Primary | ICD-10-CM

## 2024-11-06 PROBLEM — G47.00 INSOMNIA: Status: ACTIVE | Noted: 2024-11-06

## 2024-11-06 LAB
ANION GAP SERPL CALCULATED.3IONS-SCNC: 7 MMOL/L (ref 4–13)
BUN SERPL-MCNC: 11 MG/DL (ref 5–25)
CALCIUM SERPL-MCNC: 8.6 MG/DL (ref 8.4–10.2)
CHLORIDE SERPL-SCNC: 100 MMOL/L (ref 96–108)
CO2 SERPL-SCNC: 28 MMOL/L (ref 21–32)
CREAT SERPL-MCNC: 0.71 MG/DL (ref 0.6–1.3)
GFR SERPL CREATININE-BSD FRML MDRD: 93 ML/MIN/1.73SQ M
GLUCOSE SERPL-MCNC: 132 MG/DL (ref 65–140)
PHOSPHATE SERPL-MCNC: 3.2 MG/DL (ref 2.3–4.1)
POTASSIUM SERPL-SCNC: 3.6 MMOL/L (ref 3.5–5.3)
SODIUM SERPL-SCNC: 135 MMOL/L (ref 135–147)
URATE SERPL-MCNC: 4.6 MG/DL (ref 3.5–8.5)

## 2024-11-06 PROCEDURE — 84100 ASSAY OF PHOSPHORUS: CPT | Performed by: STUDENT IN AN ORGANIZED HEALTH CARE EDUCATION/TRAINING PROGRAM

## 2024-11-06 PROCEDURE — 99223 1ST HOSP IP/OBS HIGH 75: CPT | Performed by: INTERNAL MEDICINE

## 2024-11-06 PROCEDURE — 80048 BASIC METABOLIC PNL TOTAL CA: CPT | Performed by: STUDENT IN AN ORGANIZED HEALTH CARE EDUCATION/TRAINING PROGRAM

## 2024-11-06 PROCEDURE — 84550 ASSAY OF BLOOD/URIC ACID: CPT | Performed by: STUDENT IN AN ORGANIZED HEALTH CARE EDUCATION/TRAINING PROGRAM

## 2024-11-06 RX ORDER — PANTOPRAZOLE SODIUM 40 MG/1
40 TABLET, DELAYED RELEASE ORAL
Status: DISCONTINUED | OUTPATIENT
Start: 2024-11-07 | End: 2024-11-07 | Stop reason: HOSPADM

## 2024-11-06 RX ORDER — ATORVASTATIN CALCIUM 20 MG/1
20 TABLET, FILM COATED ORAL DAILY
Status: DISCONTINUED | OUTPATIENT
Start: 2024-11-06 | End: 2024-11-07 | Stop reason: HOSPADM

## 2024-11-06 RX ORDER — FUROSEMIDE 20 MG/1
20 TABLET ORAL DAILY
Status: DISCONTINUED | OUTPATIENT
Start: 2024-11-07 | End: 2024-11-07 | Stop reason: HOSPADM

## 2024-11-06 RX ORDER — DABIGATRAN ETEXILATE 150 MG/1
150 CAPSULE ORAL 2 TIMES DAILY
Status: DISCONTINUED | OUTPATIENT
Start: 2024-11-06 | End: 2024-11-07 | Stop reason: HOSPADM

## 2024-11-06 RX ORDER — SODIUM CHLORIDE 9 MG/ML
100 INJECTION, SOLUTION INTRAVENOUS CONTINUOUS
Status: DISCONTINUED | OUTPATIENT
Start: 2024-11-06 | End: 2024-11-07

## 2024-11-06 RX ORDER — CHLORAL HYDRATE 500 MG
1000 CAPSULE ORAL DAILY
Status: DISCONTINUED | OUTPATIENT
Start: 2024-11-06 | End: 2024-11-07 | Stop reason: HOSPADM

## 2024-11-06 RX ORDER — LACTOBACILLUS ACIDOPHILUS / LACTOBACILLUS BULGARICUS 100 MILLION CFU STRENGTH
1 GRANULES ORAL
Status: DISCONTINUED | OUTPATIENT
Start: 2024-11-06 | End: 2024-11-07 | Stop reason: HOSPADM

## 2024-11-06 RX ORDER — ZOLPIDEM TARTRATE 5 MG/1
5 TABLET ORAL
Status: DISCONTINUED | OUTPATIENT
Start: 2024-11-06 | End: 2024-11-06

## 2024-11-06 RX ORDER — NADOLOL 40 MG/1
80 TABLET ORAL
Status: DISCONTINUED | OUTPATIENT
Start: 2024-11-07 | End: 2024-11-07 | Stop reason: HOSPADM

## 2024-11-06 RX ORDER — LORAZEPAM 0.5 MG/1
0.5 TABLET ORAL
Status: DISCONTINUED | OUTPATIENT
Start: 2024-11-06 | End: 2024-11-07 | Stop reason: HOSPADM

## 2024-11-06 RX ORDER — ASCORBIC ACID 500 MG
500 TABLET ORAL DAILY
Status: DISCONTINUED | OUTPATIENT
Start: 2024-11-06 | End: 2024-11-07 | Stop reason: HOSPADM

## 2024-11-06 RX ORDER — ALLOPURINOL 300 MG/1
300 TABLET ORAL DAILY
Status: DISCONTINUED | OUTPATIENT
Start: 2024-11-07 | End: 2024-11-07 | Stop reason: HOSPADM

## 2024-11-06 RX ORDER — SODIUM CHLORIDE 9 MG/ML
125 INJECTION, SOLUTION INTRAVENOUS CONTINUOUS
Status: DISCONTINUED | OUTPATIENT
Start: 2024-11-06 | End: 2024-11-06

## 2024-11-06 RX ADMIN — LACTOBACILLUS ACIDOPHILUS / LACTOBACILLUS BULGARICUS 1 PACKET: 100 MILLION CFU STRENGTH GRANULES at 20:33

## 2024-11-06 RX ADMIN — ATORVASTATIN CALCIUM 20 MG: 20 TABLET, FILM COATED ORAL at 20:33

## 2024-11-06 RX ADMIN — SODIUM CHLORIDE 100 ML/HR: 0.9 INJECTION, SOLUTION INTRAVENOUS at 12:42

## 2024-11-06 RX ADMIN — LORAZEPAM 0.5 MG: 0.5 TABLET ORAL at 22:26

## 2024-11-06 RX ADMIN — VENETOCLAX 20 MG: KIT at 13:35

## 2024-11-06 RX ADMIN — OMEGA-3 FATTY ACIDS CAP 1000 MG 1000 MG: 1000 CAP at 20:33

## 2024-11-06 RX ADMIN — DABIGATRAN ETEXILATE MESYLATE 150 MG: 150 CAPSULE ORAL at 20:33

## 2024-11-06 RX ADMIN — OXYCODONE HYDROCHLORIDE AND ACETAMINOPHEN 500 MG: 500 TABLET ORAL at 20:33

## 2024-11-06 RX ADMIN — SODIUM CHLORIDE 100 ML/HR: 0.9 INJECTION, SOLUTION INTRAVENOUS at 22:23

## 2024-11-06 RX ADMIN — CYANOCOBALAMIN TAB 500 MCG 500 MCG: 500 TAB at 20:33

## 2024-11-06 NOTE — ASSESSMENT & PLAN NOTE
Wt Readings from Last 3 Encounters:   11/01/24 88 kg (194 lb)   10/31/24 87.6 kg (193 lb 3.2 oz)   10/29/24 87.8 kg (193 lb 8 oz)     September 2024 echo notes LVEF 55%, severe pulmonary hypertension, G3 DD, severely decreased right ventricular systolic function.    Plan:  Strict I's and O's.  Daily standing weights.  Continue home nadolol, furosemide.

## 2024-11-06 NOTE — ASSESSMENT & PLAN NOTE
Lab Results   Component Value Date/Time    HGB 10.9 (L) 11/04/2024 09:30 AM    HGB 10.5 (L) 10/28/2024 07:55 AM    HGB 10.7 (L) 07/20/2015 08:49 AM    HGB 10.6 (L) 01/20/2015 11:28 AM     Baseline 10-11.    Plan:  Monitor CBC.  Transfuse if hemoglobin less than 7.

## 2024-11-06 NOTE — CONSULTS
Medical Oncology/Hematology Consult Note  Murphy Willams, male, 74 y.o., 1950,  S /S -01, [id number]     Assessment and Plan    1. CLL  2. On venetoclax, at risk for TLS     Mr. Willams is a 74 year old male with unfavorable CLL (17p deletion) who is admitted today for initiation of venetoclax, for close monitoring of TLS parameters. Patient was already started on obinutuzumab in September with excellent response of CBC parameters. S/p C2 dose of obinutuzumab on 10/24/2024.     Treatment regimen specifics as below:      Venetoclax ramp up dose: The 5-week ramp up schedule is designed to gradually reduce tumor burden and the risk of TLS.     Week 1: Oral: 20 mg once daily.  Week 2: Oral: 50 mg once daily.  Week 3: Oral: 100 mg once daily.  Week 4: Oral: 200 mg once daily  Week 5: Oral: 400 mg once daily.     Patient has been started on gentle IV hydration with 100 ccs/hr. Continue home dose of allopurinol (300 mg daily). BMP, uric acid, and phosphorus levels to be checked every 12 hrs. CBC monitoring every day.     If patient is clinically stable with no signs of TLS over the next 48 hours, we plan to discharge the patient home on Friday.    Summary of recommendations:    Start venetoclax at 20 mg once daily dosing today.  20 mg daily dose to continue for 7 days, which will be followed by the ramp-up dose as specified above.  Gentle IV hydration.  Patient was also counseled on the importance of supplementing IV hydration with oral hydration.  Continue home dose of allopurinol, 300 mg daily  BMP, uric acid, phosphorus to be checked every 12 hours starting at 6 PM today.  CBC monitoring on a daily basis  After discharge, patient to continue monitoring of labs (CMP, CBC, uric acid, and LD) on a weekly basis   Follow-up with Dr. Hanley as an outpatient as specified below        Outpatient follow up plan: OP f/u has been scheduled with Dr. Hanley on 11/19/24 at 2:20P [facility].      Communication  with patient/family:  Patient and wife were updated regarding the above recommendations at bedside     Communication with team:  Primary team provider was updated regarding the above recommendations     Case was discussed with hematology/oncology attending, Dr. Florez        Reason for consultation: CLL, admission for venetoclax      History of present illness:  Murphy Willams is a 74 y.o. male with CLL, alcohol induced liver cirrhosis, paroxysmal atrial fibrillation (on Pradaxa), prolonged QTc interval (s/p AICD placement), HTN, and HLD. He is being admitted to [facility] for close monitoring of TLS parameters in the setting of venetoclax initiation.     Patient had excellent response to first 2 doses of obinutuzumab. Patient's ALC within normal range. Hgb stable around 10. Plt count within normal range.     Most recent CBC parameters from 11/4/2024 is stable.  Patient and wife are seen at bedside this morning.  Patient denied any acute complaints.     CLL history as below:     - Originally dx in 2000. Was recommended surveillance in setting of no constitutional symptoms or concerning cytopenias.    - May 2021: developed AIHA wth significant drop in Hgb; treated with prednisone     - September 2024: patient was noted to have  progressive anemia with Hgb as low as 5.7. Lymphocytosis was also progressing rapidly (ALC was ~157k). BM bx was pursued on 9/9/24, which showed marrow involvement by CLL. FISH panel was notable for 17p deletion (adverse prognostic marker) and 13q deletion (standard risk factor). Patient's worsening anemia was favored to be secondary to progressive bone marrow involvement by CLL. Patient was recommended treatment with combination of obinutuzumab and venetoclax.     Patient has gotten 4 doses of obinutuzumab thus far:  C1D1 starting on 9/11/24  C1D8 on 9/18  C1D3 on 9/26  C2D1 on 10/24/24       Review of Systems:    Review of Systems   Constitutional:  Negative for chills and fever.    HENT:  Negative for ear pain and sore throat.    Eyes:  Negative for pain and visual disturbance.   Respiratory:  Negative for cough and shortness of breath.    Cardiovascular:  Negative for chest pain and palpitations.   Gastrointestinal:  Negative for abdominal pain and vomiting.   Genitourinary:  Negative for dysuria and hematuria.   Musculoskeletal:  Negative for arthralgias and back pain.   Skin:  Negative for color change and rash.   Neurological:  Negative for dizziness, seizures, syncope, facial asymmetry, light-headedness and headaches.   Hematological:  Negative for adenopathy. Does not bruise/bleed easily.   Psychiatric/Behavioral:  Negative for agitation, behavioral problems and confusion.    All other systems reviewed and are negative.      Oncology History:   Cancer Staging   No matching staging information was found for the patient.    Oncology History   CLL (chronic lymphocytic leukemia) (HCC)   9/29/2017 Initial Diagnosis    CLL (chronic lymphocytic leukemia) (HCC)     9/10/2024 -  Chemotherapy    alteplase (CATHFLO), 2 mg, Intracatheter, Every 1 Minute as needed, 2 of 6 cycles  obinutuzumab (GAZYVA) day 1 IVPB, 100 mg, Intravenous, Once, 1 of 1 cycle  Administration: 100 mg (9/11/2024)  obinutuzumab (GAZYVA) day 2 titrated infusion, 900 mg, Intravenous, Once, 1 of 1 cycle  Administration: 900 mg (9/11/2024)  obinutuzumab (GAZYVA) subsequent titrated infusion, 1,000 mg, Intravenous, Once, 2 of 6 cycles  Administration: 1,000 mg (9/18/2024), 1,000 mg (9/26/2024), 1,000 mg (10/24/2024)         Past Medical History:   Past Medical History:   Diagnosis Date    Alcoholic cirrhosis of liver without ascites (HCC) 10/29/2024    Anxiety     BPH (benign prostatic hypertrophy)     Cancer (HCC)     CLL    CLL (chronic lymphocytic leukemia) (HCC)     2009    Colon polyp     Concussion     Resolved: 08/22/16    COVID-19 12/29/2023    Depression     Diverticulitis 01/13/2020 2014 CT done 11/19 12/19 CT done      E coli bacteremia 06/27/2021    2/2 blood cultures with Ecoli  Source appears to be the urine     Epididymitis 05/19/2021 5/2021    Gastrointestinal hemorrhage     Last assessed: 08/27/13    GERD (gastroesophageal reflux disease)     H/O vitamin D deficiency 07/07/2021    Hearing loss of aging     Hiatal hernia     History of right hip replacement 04/19/2021    History of transfusion     Hyperlipidemia     Hypertension     Hyponatremia 03/06/2023    Iron deficiency anemia     Livedo reticularis 09/02/2024    Microscopic hematuria     Last assessed: 06/28/13    OA (osteoarthritis)     right hip    Obesity (BMI 30.0-34.9) 04/07/2022    Pneumothorax     Primary osteoarthritis of right hip 09/29/2017    He is status post right hip arthroplasty    Prostatitis     Pulmonary hypertension ([facility])     QT prolongation     Seasonal allergies     Sepsis ([facility]) 03/06/2023    Unresponsive episode 03/07/2023    Urinary tract infection associated with catheterization of urinary tract  ([facility]) 02/05/2021    Pseudomonal UTI asymptomatic.  Per Urology do not treat at this time.    Urinary tract infection associated with catheterization of urinary tract  ([facility]) 02/05/2021    Pseudomonal UTI asymptomatic.  Per Urology do not treat at this time.  He did have urosepsis from E coli 7/21    UTI (urinary tract infection)     in past    Ventricular fibrillation ([facility]) 03/06/2023    Noted after unresponsive episode      Wears glasses        Past Surgical History:   Procedure Laterality Date    ADENOIDECTOMY      BLADDER SURGERY      CARDIAC CATHETERIZATION Left 03/07/2023    Procedure: Cardiac Left Heart Cath;  Surgeon: Rich Ramirez MD;  Location: [facility] CARDIAC CATH [facility];  Service: Cardiology    CARDIAC CATHETERIZATION N/A 03/07/2023    Procedure: Cardiac temporary pacemaker;  Surgeon: Rich Ramirez MD;  Location: [facility]ARDIAC C[facility];  Service: Cardiology    CARDIAC ELECTROPHYSIOLOGY PROCEDURE N/A 03/08/2023    Procedure: Cardiac icd implant;   "Surgeon: Filiberto Olsen MD;  Location:  CARDIAC CATH LAB;  Service: Cardiology    COLONOSCOPY      CYSTOSCOPY  10/09/2014    Diagnostic    CYSTOSCOPY  2020    FL CYSTOGRAM  08/15/2022    FRACTURE SURGERY      left lower arm    FRACTURE SURGERY      left femur    HERNIA REPAIR      IR BIOPSY BONE MARROW  2024    JOINT REPLACEMENT Right     hip    OTHER SURGICAL HISTORY  2011    Spinal anesthesia epidural    MS ARTHRP ACETBLR/PROX FEM PROSTC AGRFT/ALGRFT Right 2017    Procedure: ARTHROPLASTY HIP TOTAL ANTERIOR;  Surgeon: Roly Liu MD;  Location: [facility];  Service: Orthopedics    TONSILLECTOMY AND ADENOIDECTOMY      TRANSURETHRAL RESECTION OF PROSTATE      x 2    WRIST SURGERY         Family History   Problem Relation Age of Onset    No Known Problems Mother     Heart attack Father     Diabetes Brother     Prostate cancer Brother        Social History     Socioeconomic History    Marital status: /Civil Union     Spouse name: Not on file    Number of children: Not on file    Years of education: Not on file    Highest education level: Not on file   Occupational History    Not on file   Tobacco Use    Smoking status: Former     Current packs/day: 0.00     Types: Cigarettes     Quit date: 1980     Years since quittin.2     Passive exposure: Past    Smokeless tobacco: Never   Vaping Use    Vaping status: Never Used   Substance and Sexual Activity    Alcohol use: Yes     Alcohol/week: 2.0 - 4.0 standard drinks of alcohol     Types: 2 - 4 Cans of beer per week    Drug use: Not Currently     Types: Marijuana     Comment: \"medical marijuana\"    Sexual activity: Not Currently   Other Topics Concern    Not on file   Social History Narrative    Not on file     Social Determinants of Health     Financial Resource Strain: Low Risk  (10/23/2023)    Overall Financial Resource Strain (CARDIA)     Difficulty of Paying Living Expenses: Not hard at all   Food Insecurity: No Food Insecurity " (11/1/2024)    Hunger Vital Sign     Worried About Running Out of Food in the Last Year: Never true     Ran Out of Food in the Last Year: Never true   Transportation Needs: No Transportation Needs (11/1/2024)    PRAPARE - Transportation     Lack of Transportation (Medical): No     Lack of Transportation (Non-Medical): No   Physical Activity: Not on file   Stress: Not on file   Social Connections: Not on file   Intimate Partner Violence: Not on file   Housing Stability: Low Risk  (11/1/2024)    Housing Stability Vital Sign     Unable to Pay for Housing in the Last Year: No     Number of Times Moved in the Last Year: 1     Homeless in the Last Year: No         Current Facility-Administered Medications:     [START ON 11/7/2024] allopurinol (ZYLOPRIM) tablet 300 mg, 300 mg, Oral, Daily, Hernando Barfield MD    ascorbic acid (VITAMIN C) tablet 500 mg, 500 mg, Oral, Daily, Hernando Barfield MD    atorvastatin (LIPITOR) tablet 20 mg, 20 mg, Oral, Daily, Hernando Barfield MD    cyanocobalamin (VITAMIN B-12) tablet 500 mcg, 500 mcg, Oral, Daily, Hernando Barfield MD    dabigatran etexilate (PRADAXA) capsule 150 mg, 150 mg, Oral, BID, Hernando Barfield MD    fish oil capsule 1,000 mg, 1,000 mg, Oral, Daily, Hernando Barfield MD    [START ON 11/7/2024] furosemide (LASIX) tablet 20 mg, 20 mg, Oral, Daily, Hernando Barfield MD    LORazepam (ATIVAN) tablet 0.5 mg, 0.5 mg, Oral, HS PRN, Hernando Barfield MD    [START ON 11/7/2024] multivitamin-minerals (CENTRUM) tablet 1 tablet, 1 tablet, Oral, Daily, Hernando Barfield MD    [START ON 11/7/2024] nadolol (CORGARD) tablet 80 mg, 80 mg, Oral, Early Morning, Hernando Barfield MD    [START ON 11/7/2024] pantoprazole (PROTONIX) EC tablet 40 mg, 40 mg, Oral, Early Morning, Hernando Barfield MD    sodium chloride 0.9 % infusion, 100 mL/hr, Intravenous, Continuous, Suhas Boyer DO    Venetoclax TBPK 20 mg, 20 mg, Oral, Daily, Keerthy Gricelda Merlin, DO      Medications Prior to Admission:     allopurinol (ZYLOPRIM) 300 mg tablet    ascorbic acid  (VITAMIN C) 250 MG CHEW    atorvastatin (LIPITOR) 20 mg tablet    dabigatran etexilate (PRADAXA) 150 mg capsu    furosemide (LASIX) 20 mg tablet    Lactobacillus (PROBIOTIC ACIDOPHILUS PO)    LORazepam (ATIVAN) 0.5 mg tablet    nadolol (CORGARD) 80 MG tablet    Omega-3 Fatty Acids (FISH OIL) 1,000 mg    omeprazole (PriLOSEC) 40 MG capsule    zolpidem (AMBIEN) 5 mg tablet    acetaminophen (TYLENOL) 325 mg tablet    benzonatate (TESSALON) 200 MG capsule    Blood Pressure Monitoring (Omron 5 Series BP Monitor) PEE    montelukast (SINGULAIR) 10 mg tablet    Venetoclax 10 & 50 & 100 MG TBPK    Venetoclax 100 MG TABS    vitamin B-12 (CYANOCOBALAMIN) 500 MCG TABS    Allergies   Allergen Reactions    Ciprofloxacin Other (See Comments)     LONG QT syndrome    Codeine Other (See Comments)     agitated    Sulfamethoxazole-Trimethoprim GI Intolerance, Abdominal Pain and Other (See Comments)     upset stomach    Augmentin [Amoxicillin-Pot Clavulanate] Rash     Livedo reticularis    Macrobid [Nitrofurantoin] Dizziness, Headache and Fatigue     Per Patient         Physical Exam:     /62 (BP Location: Left arm)   Pulse 70   Temp 97.7 °F (36.5 °C) (Oral)   Resp 16   SpO2 97%     Physical Exam  Vitals reviewed.   Constitutional:       General: He is not in acute distress.     Appearance: He is not ill-appearing, toxic-appearing or diaphoretic.   HENT:      Head: Normocephalic and atraumatic.      Mouth/Throat:      Mouth: Mucous membranes are moist.      Pharynx: No oropharyngeal exudate or posterior oropharyngeal erythema.   Eyes:      General: No scleral icterus.     Extraocular Movements: Extraocular movements intact.      Conjunctiva/sclera: Conjunctivae normal.   Cardiovascular:      Rate and Rhythm: Normal rate and regular rhythm.      Heart sounds: No murmur heard.     No gallop.   Pulmonary:      Effort: No respiratory distress.      Breath sounds: Normal breath sounds. No wheezing, rhonchi or rales.   Abdominal:       General: Bowel sounds are normal. There is no distension.      Palpations: Abdomen is soft. There is no mass.      Tenderness: There is no abdominal tenderness.   Musculoskeletal:         General: No swelling, tenderness or deformity.      Cervical back: Normal range of motion. No rigidity.      Right lower leg: No edema.      Left lower leg: No edema.   Lymphadenopathy:      Cervical: No cervical adenopathy.   Skin:     General: Skin is warm and dry.      Findings: No bruising, erythema, lesion or rash.   Neurological:      General: No focal deficit present.      Mental Status: He is alert and oriented to person, place, and time.   Psychiatric:         Mood and Affect: Mood normal.         Judgment: Judgment normal.         Bronchoscopy    Result Date: 10/15/2024  Narrative: Table formatting from the original result was not included. [facility] [address] [facility] [id number] [phone] DATE OF SERVICE: 10/15/24 PHYSICIAN(S): Attending: Darrell Torrez MD INDICATION: Pneumonia of right middle lobe due to infectious organism POST-OP DIAGNOSIS: See the impression below. PREPROCEDURE: Standard airway preparation completed per respiratory therapy protocol.  Informed consent was obtained. Images reviewed prior to the procedure.  A Time Out was performed. No suspicion or identified risk for TB or other airborne infectious disease; bronchoscopy procedure being performed for diagnostic purposes. PROCEDURE: Bronchoscopy with bronchioalveolarlavage and brushing x 2 DETAILS OF PROCEDURE: Patient was taken to the procedure room where a time out was performed to confirm correct patient and correct procedure. The patient underwent general anesthesia, which was administered by an anesthesia professional. The patient's blood pressure, heart rate, level of consciousness, oxygen, ETCO2, ECG and respirations were monitored throughout the procedure. The patient's estimated blood loss was  minimal (<5 mL). The scope was introduced through the laryngeal mask airway. The procedure was not difficult. The patient tolerated the procedure well. There were no apparent adverse events. ANESTHESIA INFORMATION: ASA: III Anesthesia Type: General FINDINGS: Deformity in the upper trachea. Tortuous upper trachea anatomy The middle trachea, lower trachea, main mar, left lung and right lung appeared normal. Minimal, thin and clear secretions present in all observed locations, including the pharynx, larynx, vocal cords, trachea, main mar, left lung and right lung; secretions were easily removed; performed washing using 40 mL of saline with a total return of 10 mL, sent sample for microbiology analysis Bronchoalveolar lavage was performed x3 in the right middle lobar bronchus with 180 mL of saline instilled and a total return of 35 mL. The fluid appeared clear. Performed 2 brushings with cytology brush in the right middle lobar bronchus SPECIMENS: ID Type Source Tests Collected by Time Destination 1 :  Brushing Lung, Right Middle Lobe Bronchial Brushing PULMONARY CYTOLOGY Darrell Torrez MD 10/15/2024 12:21 PM  2 :  Lavage Lung, Right Middle Lobe Bronchoalveolar Lavage PULMONARY CYTOLOGY Darrell Torrez MD 10/15/2024 12:28 PM  A :  Bronchial Lung, Right Middle Lobe Bronchial Washing BRONCHIAL CULTURE AND GRAM STAIN, AFB CULTURE WITH STAIN Darrell Torrez MD 10/15/2024 12:24 PM  B :  Bronchial Lung, Right Middle Lobe Bronchoalveolar Lavage FUNGAL CULTURE, BRONCHIAL CULTURE AND GRAM STAIN, AFB CULTURE WITH STAIN, PNEUMOCYSTIS PCR Darrell Torrez MD 10/15/2024 12:30 PM  C :  Lavage Lung, Right Middle Lobe Bronchoalveolar Lavage BRONCHOALVEOLAR LAVAGE (BAL) DIFFERENTIAL Darrell Torrez MD 10/15/2024 12:31 PM  D :  Lavage Lung, Right Middle Lobe Bronchoalveolar Lavage DIRECT RESPIRATORY TB PCR ([place] [facility] [facility]) Darrell Torrez MD 10/15/2024 12:32 PM       Impression: Deformity in the upper trachea.  Tortuous upper trachea anatomy The middle trachea, lower trachea, main mar, left lung and right lung appeared normal. Minimal, thin and clear secretions present in all observed locations, including the pharynx, larynx, vocal cords, trachea, main mar, left lung and right lung; performed washing, sent sample for microbiology analysis Bronchoalveolar lavage was performed x1 in the right middle lobar bronchus Performed brushings with cytology brush in the right middle lobar bronchus RECOMMENDATION: Follow up with me in clinic Await microbiology Await cytopathology  Darrell Torrez MD       Labs and pertinent reports reviewed.

## 2024-11-06 NOTE — ASSESSMENT & PLAN NOTE
Patient does endorse taking lorazepam and zolpidem at home for insomnia as needed.    Plan:  Continue lorazepam as needed.  Consider zolpidem as needed for insomnia not resolved by lorazepam.

## 2024-11-06 NOTE — ASSESSMENT & PLAN NOTE
74-year-old male with a PMH significant for CLL, TURP x 2 and self catheterizes, long QT syndrome status post biventricular ICD, AIHA, CHF, paroxysmal A-fib on dabigatran being admitted for close monitoring for TLS with the initiation of venetoclax.  Patient follows with heme-onc outpatient Dr. Hanley, and is currently on obinutuzumab since September 2024.    Plan:  Heme-onc following, managing venetoclax.  BMP, phosphorus, uric acid every 12.  CBC and magnesium daily a.m.  IV normal saline 100 mL/h.  Continue home allopurinol.

## 2024-11-06 NOTE — PLAN OF CARE
Problem: PAIN - ADULT  Goal: Verbalizes/displays adequate comfort level or baseline comfort level  Description: Interventions:  - Encourage patient to monitor pain and request assistance  - Assess pain using appropriate pain scale  - Administer analgesics based on type and severity of pain and evaluate response  - Implement non-pharmacological measures as appropriate and evaluate response  - Consider cultural and social influences on pain and pain management  - Notify physician/advanced practitioner if interventions unsuccessful or patient reports new pain  Outcome: Progressing     Problem: INFECTION - ADULT  Goal: Absence or prevention of progression during hospitalization  Description: INTERVENTIONS:  - Assess and monitor for signs and symptoms of infection  - Monitor lab/diagnostic results  - Monitor all insertion sites, i.e. indwelling lines, tubes, and drains  - Monitor endotracheal if appropriate and nasal secretions for changes in amount and color  - [place] appropriate cooling/warming therapies per order  - Administer medications as ordered  - Instruct and encourage patient and family to use good hand hygiene technique  - Identify and instruct in appropriate isolation precautions for identified infection/condition  Outcome: Progressing  Goal: Absence of fever/infection during neutropenic period  Description: INTERVENTIONS:  - Monitor WBC    Outcome: Progressing     Problem: SAFETY ADULT  Goal: Patient will remain free of falls  Description: INTERVENTIONS:  - Educate patient/family on patient safety including physical limitations  - Instruct patient to call for assistance with activity   - Consult OT/PT to assist with strengthening/mobility   - Keep Call bell within reach  - Keep bed low and locked with side rails adjusted as appropriate  - Keep care items and personal belongings within reach  - Initiate and maintain comfort rounds  - Make Fall Risk Sign visible to staff  - Offer Toileting every  Hours,  in advance of need  - Initiate/Maintain alarm  - Obtain necessary fall risk management equipment:   - Apply yellow socks and bracelet for high fall risk patients  - Consider moving patient to room near nurses station  Outcome: Progressing  Goal: Maintain or return to baseline ADL function  Description: INTERVENTIONS:  -  Assess patient's ability to carry out ADLs; assess patient's baseline for ADL function and identify physical deficits which impact ability to perform ADLs (bathing, care of mouth/teeth, toileting, grooming, dressing, etc.)  - Assess/evaluate cause of self-care deficits   - Assess range of motion  - Assess patient's mobility; develop plan if impaired  - Assess patient's need for assistive devices and provide as appropriate  - Encourage maximum independence but intervene and supervise when necessary  - Involve family in performance of ADLs  - Assess for home care needs following discharge   - Consider OT consult to assist with ADL evaluation and planning for discharge  - Provide patient education as appropriate  Outcome: Progressing  Goal: Maintains/Returns to pre admission functional level  Description: INTERVENTIONS:  - Perform AM-PAC 6 Click Basic Mobility/ Daily Activity assessment daily.  - Set and communicate daily mobility goal to care team and patient/family/caregiver.   - Collaborate with rehabilitation services on mobility goals if consulted  - Perform Range of Motion  times a day.  - Reposition patient every  hours.  - Dangle patient  times a day  - Stand patient  times a day  - Ambulate patient  times a day  - Out of bed to chair  times a day   - Out of bed for meals times a day  - Out of bed for toileting  - Record patient progress and toleration of activity level   Outcome: Progressing     Problem: DISCHARGE PLANNING  Goal: Discharge to home or other facility with appropriate resources  Description: INTERVENTIONS:  - Identify barriers to discharge w/patient and caregiver  - Arrange for  needed discharge resources and transportation as appropriate  - Identify discharge learning needs (meds, wound care, etc.)  - Arrange for interpretive services to assist at discharge as needed  - Refer to Case Management Department for coordinating discharge planning if the patient needs post-hospital services based on physician/advanced practitioner order or complex needs related to functional status, cognitive ability, or social support system  Outcome: Progressing     Problem: Knowledge Deficit  Goal: Patient/family/caregiver demonstrates understanding of disease process, treatment plan, medications, and discharge instructions  Description: Complete learning assessment and assess knowledge base.  Interventions:  - Provide teaching at level of understanding  - Provide teaching via preferred learning methods  Outcome: Progressing

## 2024-11-06 NOTE — H&P
H&P - Hospitalist   Name: Murphy Willams 74 y.o. male I MRN: [id number]  Unit/Bed#: S -01 I Date of Admission: 11/6/2024   Date of Service: 11/6/2024 I Hospital Day: 0     Assessment & Plan  CLL (chronic lymphocytic leukemia) (HCC)  74-year-old male with a PMH significant for CLL, TURP x 2 and self catheterizes, long QT syndrome status post biventricular ICD, AIHA, CHF, paroxysmal A-fib on dabigatran being admitted for close monitoring for TLS with the initiation of venetoclax.  Patient follows with heme-onc outpatient Dr. Hanley, and is currently on obinutuzumab since September 2024.    Plan:  Heme-onc following, managing venetoclax.  BMP, phosphorus, uric acid every 12.  CBC and magnesium daily a.m.  IV normal saline 100 mL/h.  Continue home allopurinol.  Chronic combined systolic and diastolic congestive heart failure (HCC)  Wt Readings from Last 3 Encounters:   11/01/24 88 kg (194 lb)   10/31/24 87.6 kg (193 lb 3.2 oz)   10/29/24 87.8 kg (193 lb 8 oz)     September 2024 echo notes LVEF 55%, severe pulmonary hypertension, G3 DD, severely decreased right ventricular systolic function.    Plan:  Strict I's and O's.  Daily standing weights.  Continue home nadolol, furosemide.  Insomnia  Patient does endorse taking lorazepam and zolpidem at home for insomnia as needed.    Plan:  Continue lorazepam as needed.  Consider zolpidem as needed for insomnia not resolved by lorazepam.  Essential hypertension  Continue home nadolol and furosemide.  Paroxysmal A-fib (HCC)  Continue home dabigatran twice daily.  Long QT syndrome type 2  S/p biventricular ICD.  Continue home sotalol.  Anemia  Lab Results   Component Value Date/Time    HGB 10.9 (L) 11/04/2024 09:30 AM    HGB 10.5 (L) 10/28/2024 07:55 AM    HGB 10.7 (L) 07/20/2015 08:49 AM    HGB 10.6 (L) 01/20/2015 11:28 AM     Baseline 10-11.    Plan:  Monitor CBC.  Transfuse if hemoglobin less than 7.  Gastroesophageal reflux disease without esophagitis  Continue home  omeprazole.  Hypercholesterolemia  Continue home atorvastatin.      VTE Pharmacologic Prophylaxis: VTE Score: 5 High Risk (Score >/= 5) - Pharmacological DVT Prophylaxis Contraindicated. Sequential Compression Devices Ordered.  Code Status: Prior   Discussion with family: Updated  (wife) at bedside.    Anticipated Length of Stay: Patient will be admitted on an inpatient basis with an anticipated length of stay of greater than 2 midnights secondary to CLL.    History of Present Illness   Chief Complaint: CLL admission for TLS monitoring    Murphy Willams is a 74 y.o. male with a PMH of CLL, TURP x 2 for neurogenic bladder, Long QT syndrome s/p biventricular ICD, AIHA, CHF, paroxysmal afib, essential HTN who is being admitted for close monitoring of TLS due to initiation of venetoclax. Venetoclax will have a 5-week ramp up schedule to reduce risk of TLS. Patient has been receiving obinutuzumab since 9/11/24, and follows with heme-onc outpatient Dr. Hanley.    Patient states that he has been diagnosed with CLL since 2010 and did not require treatment until September 2024.  In September, patient was hospitalized for pneumonia with significant leukocytosis to 240k and anemia, and patient was started on obinutuzumab with excellent response.    Patient not endorsing any headache, lightheadedness/dizziness, shortness of breath, chest pain, abdominal pain, dysuria, urinary frequency, change in BM.    Review of Systems   All other systems reviewed and are negative.      Historical Information   Past Medical History:   Diagnosis Date    Alcoholic cirrhosis of liver without ascites (HCC) 10/29/2024    Anxiety     BPH (benign prostatic hypertrophy)     Cancer (HCC)     CLL    CLL (chronic lymphocytic leukemia) (HCC)     2009    Colon polyp     Concussion     Resolved: 08/22/16    COVID-19 12/29/2023    Depression     Diverticulitis 01/13/2020 2014 CT done 11/19 12/19 CT done     E coli bacteremia 06/27/2021     2/2 blood cultures with Ecoli  Source appears to be the urine     Epididymitis 05/19/2021 5/2021    Gastrointestinal hemorrhage     Last assessed: 08/27/13    GERD (gastroesophageal reflux disease)     H/O vitamin D deficiency 07/07/2021    Hearing loss of aging     Hiatal hernia     History of right hip replacement 04/19/2021    History of transfusion     Hyperlipidemia     Hypertension     Hyponatremia 03/06/2023    Iron deficiency anemia     Livedo reticularis 09/02/2024    Microscopic hematuria     Last assessed: 06/28/13    OA (osteoarthritis)     right hip    Obesity (BMI 30.0-34.9) 04/07/2022    Pneumothorax     Primary osteoarthritis of right hip 09/29/2017    He is status post right hip arthroplasty    Prostatitis     Pulmonary hypertension ([facility])     QT prolongation     Seasonal allergies     Sepsis ([facility]) 03/06/2023    Unresponsive episode 03/07/2023    Urinary tract infection associated with catheterization of urinary tract  ([facility]) 02/05/2021    Pseudomonal UTI asymptomatic.  Per Urology do not treat at this time.    Urinary tract infection associated with catheterization of urinary tract  ([facility]) 02/05/2021    Pseudomonal UTI asymptomatic.  Per Urology do not treat at this time.  He did have urosepsis from E coli 7/21    UTI (urinary tract infection)     in past    Ventricular fibrillation ([facility]) 03/06/2023    Noted after unresponsive episode      Wears glasses      Past Surgical History:   Procedure Laterality Date    ADENOIDECTOMY      BLADDER SURGERY      CARDIAC CATHETERIZATION Left 03/07/2023    Procedure: Cardiac Left Heart Cath;  Surgeon: Rich Ramirez MD;  Location: [facility];  Service: Cardiology    CARDIAC CATHETERIZATION N/A 03/07/2023    Procedure: Cardiac temporary pacemaker;  Surgeon: Rich Ramirez MD;  Location: [facility];  Service: Cardiology    CARDIAC ELECTROPHYSIOLOGY PROCEDURE N/A 03/08/2023    Procedure: Cardiac icd implant;  Surgeon: Filiberto Olsen MD;  Location:   "CARDIAC CATH LAB;  Service: Cardiology    COLONOSCOPY      CYSTOSCOPY  10/09/2014    Diagnostic    CYSTOSCOPY  2020    FL CYSTOGRAM  08/15/2022    FRACTURE SURGERY      left lower arm    FRACTURE SURGERY      left femur    HERNIA REPAIR      IR BIOPSY BONE MARROW  2024    JOINT REPLACEMENT Right     hip    OTHER SURGICAL HISTORY  2011    Spinal anesthesia epidural    CA ARTHRP ACETBLR/PROX FEM PROSTC AGRFT/ALGRFT Right 2017    Procedure: ARTHROPLASTY HIP TOTAL ANTERIOR;  Surgeon: Roly Liu MD;  Location: [facility];  Service: Orthopedics    TONSILLECTOMY AND ADENOIDECTOMY      TRANSURETHRAL RESECTION OF PROSTATE      x 2    WRIST SURGERY       Social History     Tobacco Use    Smoking status: Former     Current packs/day: 0.00     Types: Cigarettes     Quit date: 1980     Years since quittin.2     Passive exposure: Past    Smokeless tobacco: Never   Vaping Use    Vaping status: Never Used   Substance and Sexual Activity    Alcohol use: Yes     Alcohol/week: 2.0 - 4.0 standard drinks of alcohol     Types: 2 - 4 Cans of beer per week    Drug use: Not Currently     Types: Marijuana     Comment: \"medical marijuana\"    Sexual activity: Not Currently     E-Cigarette/Vaping    E-Cigarette Use Never User      E-Cigarette/Vaping Substances    Nicotine No     THC No     CBD No     Flavoring No     Other No     Unknown No      Social History:  Marital Status: /Civil Union   Occupation: Retired  Patient Pre-hospital Living Situation: Home  Patient Pre-hospital Level of Mobility: walks  Patient Pre-hospital Diet Restrictions: None    Meds/Allergies   I have reviewed home medications with patient personally.  Prior to Admission medications    Medication Sig Start Date End Date Taking? Authorizing Provider   acetaminophen (TYLENOL) 325 mg tablet Take 2 tablets (650 mg total) by mouth every 6 (six) hours as needed for mild pain 24   Bhavesh Jarvis MD   allopurinol (ZYLOPRIM) 300 mg tablet " Take 1 tablet (300 mg total) by mouth daily 10/2/24   Pravin Castaneda Jr., MD   ascorbic acid (VITAMIN C) 250 MG CHEW Chew 250 mg daily    Historical Provider, MD   atorvastatin (LIPITOR) 20 mg tablet Take 1 tablet (20 mg total) by mouth daily 9/24/24   Pravin Castaneda Jr., MD   benzonatate (TESSALON) 200 MG capsule Take 1 capsule (200 mg total) by mouth 3 (three) times a day as needed for cough 10/8/24   Darrell Torrez MD   Blood Pressure Monitoring (Omron 5 Series BP Monitor) PEE  10/8/21   Historical Provider, MD   dabigatran etexilate (PRADAXA) 150 mg capsu Take 1 capsule (150 mg total) by mouth 2 (two) times a day 10/31/24   Filiberto Olsen MD   furosemide (LASIX) 20 mg tablet Take 1 tablet (20 mg total) by mouth daily 10/1/24   Eric Bueno DO   Lactobacillus (PROBIOTIC ACIDOPHILUS PO) Take by mouth    Historical Provider, MD   LORazepam (ATIVAN) 0.5 mg tablet TAKE ONE TABLET BY MOUTH DAILY AS NEEDED for anxiety 10/22/24   Pravin Castaneda Jr., MD   montelukast (SINGULAIR) 10 mg tablet TAKE ONE TABLET BY MOUTH DAILY 7/13/22   Pravin Castaneda Jr., MD   nadolol (CORGARD) 80 MG tablet Take 1 tablet (80 mg total) by mouth daily in the early morning 3/25/24   Filiberto Olsen MD   Omega-3 Fatty Acids (FISH OIL) 1,000 mg Take by mouth daily    Historical Provider, MD   omeprazole (PriLOSEC) 40 MG capsule Take one capsule by mouth once every day. 9/24/24   Pravin Castaneda Jr., MD   Venetoclax 10 & 50 & 100 MG TBPK Will start in hospital  Take 20 mg by mouth daily for 7 days, then 50 mg daily for 7 days, then 100 mg daily for 7 days, then 200 mg daily thereafter 9/20/24   David Hanley MD   Venetoclax 100 MG TABS Take 2 tablets (200 mg total) by mouth daily 9/20/24   David Hanley MD   vitamin B-12 (CYANOCOBALAMIN) 500 MCG TABS Take 1 tablet (500 mcg total) by mouth daily 4/12/21 10/31/24  Randal Lomas MD   zolpidem (AMBIEN) 5 mg tablet Take 1/2-1 full tablet at bedtime as needed for sleep.  Do not take if  drinking any alcohol or taking lorazepam 9/19/24   Pravin Castaneda Jr., MD     Allergies   Allergen Reactions    Ciprofloxacin Other (See Comments)     LONG QT syndrome    Codeine Other (See Comments)     agitated    Sulfamethoxazole-Trimethoprim GI Intolerance, Abdominal Pain and Other (See Comments)     upset stomach    Augmentin [Amoxicillin-Pot Clavulanate] Rash     Livedo reticularis    Macrobid [Nitrofurantoin] Dizziness, Headache and Fatigue     Per Patient       Objective :  Temp:  [97.7 °F (36.5 °C)] 97.7 °F (36.5 °C)  HR:  [70] 70  BP: (109)/(62) 109/62  Resp:  [16] 16  SpO2:  [97 %] 97 %  O2 Device: None (Room air)    Physical Exam  Vitals and nursing note reviewed.   Constitutional:       General: He is not in acute distress.     Appearance: He is well-developed.   HENT:      Head: Normocephalic and atraumatic.      Mouth/Throat:      Mouth: Mucous membranes are moist.   Eyes:      Conjunctiva/sclera: Conjunctivae normal.   Cardiovascular:      Rate and Rhythm: Normal rate and regular rhythm.      Pulses: Normal pulses.      Heart sounds: Normal heart sounds. No murmur heard.  Pulmonary:      Effort: Pulmonary effort is normal. No respiratory distress.      Breath sounds: Normal breath sounds.   Abdominal:      General: Bowel sounds are normal. There is no distension.      Palpations: Abdomen is soft. There is no mass.      Tenderness: There is no abdominal tenderness. There is no guarding or rebound.   Musculoskeletal:         General: No swelling.      Cervical back: Neck supple.      Right lower leg: No edema.      Left lower leg: No edema.   Skin:     General: Skin is warm and dry.      Capillary Refill: Capillary refill takes less than 2 seconds.      Comments: Livedo reticularis of lower extremities bilaterally, no pruritus or warmth.   Neurological:      General: No focal deficit present.      Mental Status: He is alert and oriented to person, place, and time. Mental status is at baseline.    Psychiatric:         Mood and Affect: Mood normal.          Lines/Drains:            Lab Results: I have reviewed the following results:  Results from last 7 days   Lab Units 11/04/24  0930   WBC Thousand/uL 8.00   HEMOGLOBIN g/dL 10.9*   HEMATOCRIT % 31.8*   PLATELETS Thousands/uL 243   SEGS PCT % 70   LYMPHO PCT % 13*   MONO PCT % 13*   EOS PCT % 2     Results from last 7 days   Lab Units 11/04/24  0930   SODIUM mmol/L 138   POTASSIUM mmol/L 4.4   CHLORIDE mmol/L 99   CO2 mmol/L 29   BUN mg/dL 12   CREATININE mg/dL 0.73   ANION GAP mmol/L 10   CALCIUM mg/dL 9.2   ALBUMIN g/dL 4.5   TOTAL BILIRUBIN mg/dL 1.42*   ALK PHOS U/L 97   ALT U/L 21   AST U/L 26             Lab Results   Component Value Date    HGBA1C <4.2 10/14/2024    HGBA1C 5.6 08/20/2024    HGBA1C 5.5 05/31/2024           Imaging Results Review: I reviewed radiology reports from this admission including: chest xray.  Other Study Results Review: EKG was reviewed.     Administrative Statements     ** Please Note: This note has been constructed using a voice recognition system. **

## 2024-11-06 NOTE — PROCEDURES
Venous Access Line Insertion    Date/Time: 11/6/2024 12:38 PM    Performed by: Khushbu Rodriguez RN  Authorized by: Kyle Clifford DO    Patient location:  Bedside  Consent:     Consent obtained:  Verbal    Consent given by:  Patient  Pre-procedure details:     Hand hygiene: Hand hygiene performed prior to insertion      Sterile barrier technique: All elements of maximal sterile technique followed      Skin preparation:  ChloraPrep    Skin preparation agent: Skin preparation agent completely dried prior to procedure    Procedure details:     Complex Venous Access Line Type: US Guided Peripheral IV      Orientation:  Left    Location:  Forearm    Catheter size:  20 gauge    Sterile ultrasound techniques: Sterile gel and sterile probe covers were used      Number of attempts:  1    Successful placement: yes    Anesthesia (see MAR for exact dosages):     Anesthesia method:  None  Post-procedure details:     Post-procedure:  Dressing applied    Assessment:  Blood return through all ports    Post-procedure complications: none      Patient tolerance of procedure:  Tolerated well, no immediate complications

## 2024-11-07 ENCOUNTER — TELEPHONE (OUTPATIENT)
Age: 74
End: 2024-11-07

## 2024-11-07 ENCOUNTER — TRANSITIONAL CARE MANAGEMENT (OUTPATIENT)
Dept: FAMILY MEDICINE CLINIC | Facility: CLINIC | Age: 74
End: 2024-11-07

## 2024-11-07 VITALS
RESPIRATION RATE: 18 BRPM | WEIGHT: 196.2 LBS | DIASTOLIC BLOOD PRESSURE: 73 MMHG | SYSTOLIC BLOOD PRESSURE: 132 MMHG | OXYGEN SATURATION: 97 % | HEART RATE: 74 BPM | BODY MASS INDEX: 29.83 KG/M2 | TEMPERATURE: 98 F

## 2024-11-07 LAB
ALBUMIN SERPL BCG-MCNC: 4.2 G/DL (ref 3.5–5)
ALP SERPL-CCNC: 87 U/L (ref 34–104)
ALT SERPL W P-5'-P-CCNC: 17 U/L (ref 7–52)
ANION GAP SERPL CALCULATED.3IONS-SCNC: 7 MMOL/L (ref 4–13)
AST SERPL W P-5'-P-CCNC: 16 U/L (ref 13–39)
BASOPHILS # BLD AUTO: 0.05 THOUSANDS/ÂΜL (ref 0–0.1)
BASOPHILS NFR BLD AUTO: 1 % (ref 0–1)
BILIRUB SERPL-MCNC: 0.85 MG/DL (ref 0.2–1)
BUN SERPL-MCNC: 9 MG/DL (ref 5–25)
CALCIUM SERPL-MCNC: 8.8 MG/DL (ref 8.4–10.2)
CHLORIDE SERPL-SCNC: 104 MMOL/L (ref 96–108)
CO2 SERPL-SCNC: 27 MMOL/L (ref 21–32)
CREAT SERPL-MCNC: 0.66 MG/DL (ref 0.6–1.3)
EOSINOPHIL # BLD AUTO: 0.17 THOUSAND/ÂΜL (ref 0–0.61)
EOSINOPHIL NFR BLD AUTO: 2 % (ref 0–6)
ERYTHROCYTE [DISTWIDTH] IN BLOOD BY AUTOMATED COUNT: 19.9 % (ref 11.6–15.1)
GFR SERPL CREATININE-BSD FRML MDRD: 96 ML/MIN/1.73SQ M
GLUCOSE SERPL-MCNC: 117 MG/DL (ref 65–140)
HCT VFR BLD AUTO: 28.6 % (ref 36.5–49.3)
HGB BLD-MCNC: 9.8 G/DL (ref 12–17)
IMM GRANULOCYTES # BLD AUTO: 0.07 THOUSAND/UL (ref 0–0.2)
IMM GRANULOCYTES NFR BLD AUTO: 1 % (ref 0–2)
LYMPHOCYTES # BLD AUTO: 1.31 THOUSANDS/ÂΜL (ref 0.6–4.47)
LYMPHOCYTES NFR BLD AUTO: 16 % (ref 14–44)
MAGNESIUM SERPL-MCNC: 1.9 MG/DL (ref 1.9–2.7)
MCH RBC QN AUTO: 36.2 PG (ref 26.8–34.3)
MCHC RBC AUTO-ENTMCNC: 34.3 G/DL (ref 31.4–37.4)
MCV RBC AUTO: 106 FL (ref 82–98)
MONOCYTES # BLD AUTO: 0.97 THOUSAND/ÂΜL (ref 0.17–1.22)
MONOCYTES NFR BLD AUTO: 12 % (ref 4–12)
NEUTROPHILS # BLD AUTO: 5.68 THOUSANDS/ÂΜL (ref 1.85–7.62)
NEUTS SEG NFR BLD AUTO: 68 % (ref 43–75)
NRBC BLD AUTO-RTO: 0 /100 WBCS
PHOSPHATE SERPL-MCNC: 3.6 MG/DL (ref 2.3–4.1)
PLATELET # BLD AUTO: 232 THOUSANDS/UL (ref 149–390)
PMV BLD AUTO: 9.2 FL (ref 8.9–12.7)
POTASSIUM SERPL-SCNC: 3.9 MMOL/L (ref 3.5–5.3)
PROT SERPL-MCNC: 6.7 G/DL (ref 6.4–8.4)
RBC # BLD AUTO: 2.71 MILLION/UL (ref 3.88–5.62)
SODIUM SERPL-SCNC: 138 MMOL/L (ref 135–147)
URATE SERPL-MCNC: 4.7 MG/DL (ref 3.5–8.5)
WBC # BLD AUTO: 8.25 THOUSAND/UL (ref 4.31–10.16)

## 2024-11-07 PROCEDURE — 80053 COMPREHEN METABOLIC PANEL: CPT

## 2024-11-07 PROCEDURE — NC001 PR NO CHARGE: Performed by: INTERNAL MEDICINE

## 2024-11-07 PROCEDURE — 99239 HOSP IP/OBS DSCHRG MGMT >30: CPT | Performed by: INTERNAL MEDICINE

## 2024-11-07 PROCEDURE — 85025 COMPLETE CBC W/AUTO DIFF WBC: CPT

## 2024-11-07 PROCEDURE — 84100 ASSAY OF PHOSPHORUS: CPT | Performed by: STUDENT IN AN ORGANIZED HEALTH CARE EDUCATION/TRAINING PROGRAM

## 2024-11-07 PROCEDURE — 84550 ASSAY OF BLOOD/URIC ACID: CPT | Performed by: STUDENT IN AN ORGANIZED HEALTH CARE EDUCATION/TRAINING PROGRAM

## 2024-11-07 PROCEDURE — 83735 ASSAY OF MAGNESIUM: CPT

## 2024-11-07 RX ADMIN — ALLOPURINOL 300 MG: 300 TABLET ORAL at 08:04

## 2024-11-07 RX ADMIN — FUROSEMIDE 20 MG: 20 TABLET ORAL at 08:04

## 2024-11-07 RX ADMIN — MULTIPLE VITAMINS W/ MINERALS TAB 1 TABLET: TAB ORAL at 08:04

## 2024-11-07 RX ADMIN — NADOLOL 80 MG: 40 TABLET ORAL at 08:05

## 2024-11-07 RX ADMIN — PANTOPRAZOLE SODIUM 40 MG: 40 TABLET, DELAYED RELEASE ORAL at 08:04

## 2024-11-07 RX ADMIN — VENETOCLAX 20 MG: KIT at 09:32

## 2024-11-07 RX ADMIN — DABIGATRAN ETEXILATE MESYLATE 150 MG: 150 CAPSULE ORAL at 08:04

## 2024-11-07 NOTE — ASSESSMENT & PLAN NOTE
Wt Readings from Last 3 Encounters:   11/07/24 89 kg (196 lb 3.2 oz)   11/01/24 88 kg (194 lb)   10/31/24 87.6 kg (193 lb 3.2 oz)     September 2024 echo notes LVEF 55%, severe pulmonary hypertension, G3 DD, severely decreased right ventricular systolic function.    Plan:  Continue nadolol, furosemide.

## 2024-11-07 NOTE — TELEPHONE ENCOUNTER
Upon review of the In Basket request we were able to locate, review, and update the patient chart as requested for Diabetic Eye Exam.    Any additional questions or concerns should be emailed to the Practice Liaisons via the appropriate education email address, please do not reply via In Basket.    Thank you  Lexii Luong MA   [facility]

## 2024-11-07 NOTE — DISCHARGE SUMMARY
Discharge Summary - Hospitalist   Name: Murphy Willams 74 y.o. male I MRN: [id number]  Unit/Bed#: S -01 I Date of Admission: 11/6/2024   Date of Service: 11/7/2024 I Hospital Day: 1     Assessment & Plan  CLL (chronic lymphocytic leukemia) (HCC)  74-year-old male with a PMH significant for CLL, TURP x 2 and self catheterizes, long QT syndrome status post biventricular ICD, AIHA, CHF, paroxysmal A-fib on dabigatran being admitted for close monitoring for TLS with the initiation of venetoclax.  Patient follows with heme-onc outpatient Dr. Hanley, and is currently on obinutuzumab since September 2024.    Plan:  Continue Venetoclax ramp up dose as discussed with Oncologist  Week 1: Oral: 20 mg once daily.  Week 2: Oral: 50 mg once daily.  Week 3: Oral: 100 mg once daily.  Week 4: Oral: 200 mg once daily  Week 5: Oral: 400 mg once daily  Chronic combined systolic and diastolic congestive heart failure (HCC)  Wt Readings from Last 3 Encounters:   11/07/24 89 kg (196 lb 3.2 oz)   11/01/24 88 kg (194 lb)   10/31/24 87.6 kg (193 lb 3.2 oz)     September 2024 echo notes LVEF 55%, severe pulmonary hypertension, G3 DD, severely decreased right ventricular systolic function.    Plan:  Continue nadolol, furosemide.  Insomnia  Patient does endorse taking lorazepam and zolpidem at home for insomnia as needed.    Plan:  Continue lorazepam as needed.  Consider zolpidem as needed for insomnia not resolved by lorazepam.  Essential hypertension  Continue nadolol and furosemide.  Paroxysmal A-fib (HCC)  Continue dabigatran twice daily.  Long QT syndrome type 2  S/p biventricular ICD.  Continue sotalol.  Anemia  Lab Results   Component Value Date/Time    HGB 9.8 (L) 11/07/2024 05:12 AM    HGB 10.9 (L) 11/04/2024 09:30 AM    HGB 10.7 (L) 07/20/2015 08:49 AM    HGB 10.6 (L) 01/20/2015 11:28 AM     Baseline 10-11.  Gastroesophageal reflux disease without esophagitis  Continue omeprazole.  Hypercholesterolemia  Continue atorvastatin.      Medical Problems       Resolved Problems  Date Reviewed: 11/1/2024   None       Discharging Physician / Practitioner: Clarice Tomlin MD  PCP: Pravin Ortega Jr, MD  Admission Date:   Admission Orders (From admission, onward)       Ordered        11/06/24 0929  INPATIENT ADMISSION  Once                          Discharge Date: 11/07/24    Consultations During Hospital Stay:  Hematology-Oncology    Procedures Performed:   None    Significant Findings / Test Results:   None    Incidental Findings:   None     Test Results Pending at Discharge (will require follow up):   None     Outpatient Tests Requested:  Weekly CBC, CMP, LD, uric acid, phosphorus per Oncologist's recommendations    Complications:  None    Reason for Admission: CLL initiation of Venetoclax    Hospital Course:   Murphy Willams is a 74 y.o. male patient who originally presented to the hospital on 11/6/2024 due to initiation of venetoclax. Venetoclax will have a 5-week ramp up schedule to reduce risk of TLS. Patient has been receiving obinutuzumab since 9/11/24, and follows with heme-onc outpatient Dr. Hanley.     Patient states that he has been diagnosed with CLL since 2010 and did not require treatment until September 2024.  In September, patient was hospitalized for pneumonia with significant leukocytosis to 240k and anemia, and patient was started on obinutuzumab with excellent response.     Patient was monitored overnight under SLIM service. Two sets of labs were obtained. No signs of tumor lysis syndrome were noted on both sets of lab collected. Patient remained stable overnight. No symptoms reported. He was cleared for discharge by hematology-oncology team with continued ramp up schedule of Venetoclax as discussed.    Please see above list of diagnoses and related plan for additional information.     Condition at Discharge: fair    Discharge Day Visit / Exam:   Subjective: Patient has no acute events overnight.  Patient was sitting  on recliner chair on my evaluation, no acute distress. He denies any acute concerns. No lightheadedness, chest pain, SOB, GI/ symptoms.    Vitals: Blood Pressure: 132/73 (11/07/24 0708)  Pulse: 74 (11/07/24 0708)  Temperature: 98 °F (36.7 °C) (11/07/24 0708)  Temp Source: Oral (11/06/24 0919)  Respirations: 18 (11/07/24 0708)  Weight - Scale: 89 kg (196 lb 3.2 oz) (11/07/24 0807)  SpO2: 97 % (11/07/24 0708)  Physical Exam  Vitals reviewed.   Constitutional:       General: He is not in acute distress.     Appearance: He is well-developed.   HENT:      Head: Normocephalic and atraumatic.      Mouth/Throat:      Mouth: Mucous membranes are moist.   Eyes:      Conjunctiva/sclera: Conjunctivae normal.   Cardiovascular:      Rate and Rhythm: Normal rate and regular rhythm.      Pulses: Normal pulses.      Heart sounds: Normal heart sounds. No murmur heard.  Pulmonary:      Effort: Pulmonary effort is normal. No respiratory distress.      Breath sounds: Normal breath sounds.   Abdominal:      Palpations: Abdomen is soft.      Tenderness: There is no abdominal tenderness.   Musculoskeletal:         General: No swelling or tenderness.      Cervical back: Neck supple.   Skin:     General: Skin is warm and dry.      Capillary Refill: Capillary refill takes less than 2 seconds.   Neurological:      General: No focal deficit present.      Mental Status: He is alert and oriented to person, place, and time.   Psychiatric:         Mood and Affect: Mood normal.          Discussion with Family: Updated  (wife) at bedside.    Discharge instructions/Information to patient and family:   See after visit summary for information provided to patient and family.      Provisions for Follow-Up Care:  See after visit summary for information related to follow-up care and any pertinent home health orders.      Mobility at time of Discharge:   Basic Mobility Inpatient Raw Score: 24  JH-HLM Goal: 8: Walk 250 feet or more  JH-HLM  Achieved: 7: Walk 25 feet or more  HLM Goal NOT achieved. Continue to encourage mobility in post discharge setting.     Disposition:   Home    Planned Readmission: No    Discharge Medications:  See after visit summary for reconciled discharge medications provided to patient and/or family.      Administrative Statements   Discharge Statement:  I have spent a total time of >30 minutes in caring for this patient on the day of the visit/encounter. .    **Please Note: This note may have been constructed using a voice recognition system**

## 2024-11-07 NOTE — ASSESSMENT & PLAN NOTE
Lab Results   Component Value Date/Time    HGB 9.8 (L) 11/07/2024 05:12 AM    HGB 10.9 (L) 11/04/2024 09:30 AM    HGB 10.7 (L) 07/20/2015 08:49 AM    HGB 10.6 (L) 01/20/2015 11:28 AM     Baseline 10-11.

## 2024-11-07 NOTE — ASSESSMENT & PLAN NOTE
74-year-old male with a PMH significant for CLL, TURP x 2 and self catheterizes, long QT syndrome status post biventricular ICD, AIHA, CHF, paroxysmal A-fib on dabigatran being admitted for close monitoring for TLS with the initiation of venetoclax.  Patient follows with heme-onc outpatient Dr. Hanley, and is currently on obinutuzumab since September 2024.    Plan:  Continue Venetoclax ramp up dose as discussed with Oncologist  Week 1: Oral: 20 mg once daily.  Week 2: Oral: 50 mg once daily.  Week 3: Oral: 100 mg once daily.  Week 4: Oral: 200 mg once daily  Week 5: Oral: 400 mg once daily

## 2024-11-07 NOTE — PROGRESS NOTES
Medical Oncology/Hematology Progress Note  Murphy Willams, male, 74 y.o., 1950,  S /S -01, [id number]     Assessment and Plan    1. CLL  2. On venetoclax, at risk for TLS     Mr. Willams is a 74 year old male with unfavorable CLL (17p deletion) who was admitted on 11/6/24 for initiation of venetoclax, for close monitoring of TLS parameters. Patient was already started on obinutuzumab in September with excellent response of CBC parameters. S/p C2 dose of obinutuzumab on 10/24/2024.    Patient tolerated first dose of venetoclax without any acute issues. TLS labs from yesterday evening and this morning are all within normal range. IVF can be discontinued. Patient can be discharged home today from med/onc standpoint with weekly monitoring of TLS parameters. He is aware of the weekly ramp up dosing of venetoclax and he has an OP follow-up with Dr. Hanley as noted below.        Outpatient follow up plan: OP f/u has been scheduled with Dr. Hanley on 11/19/24 at 2:20P [facility].      Communication with patient/family:  Patient and wife were updated regarding the above recommendations at bedside     Communication with team:  Primary team resident  was updated regarding the above recommendations     Case was discussed with hematology/oncology attending, Dr. Florez     Subjective:     Patient was seen at bedside this morning. No signs of TLS on the 2 sets of labs collect post venetoclax initiation. Patient denied any acute complaints.        Review of Systems:    Review of Systems   Constitutional:  Negative for chills and fever.   HENT:  Negative for ear pain and sore throat.    Eyes:  Negative for pain and visual disturbance.   Respiratory:  Negative for cough and shortness of breath.    Cardiovascular:  Negative for chest pain and palpitations.   Gastrointestinal:  Negative for abdominal pain and vomiting.   Genitourinary:  Negative for dysuria and hematuria.   Musculoskeletal:  Negative for  arthralgias and back pain.   Skin:  Negative for color change and rash.   Neurological:  Negative for dizziness, seizures, syncope, facial asymmetry, light-headedness and headaches.   Hematological:  Negative for adenopathy. Does not bruise/bleed easily.   Psychiatric/Behavioral:  Negative for agitation, behavioral problems and confusion.    All other systems reviewed and are negative.      Oncology History:   Cancer Staging   No matching staging information was found for the patient.    Oncology History   CLL (chronic lymphocytic leukemia) (HCC)   9/29/2017 Initial Diagnosis    CLL (chronic lymphocytic leukemia) (HCC)     9/10/2024 -  Chemotherapy    alteplase (CATHFLO), 2 mg, Intracatheter, Every 1 Minute as needed, 2 of 6 cycles  obinutuzumab (GAZYVA) day 1 IVPB, 100 mg, Intravenous, Once, 1 of 1 cycle  Administration: 100 mg (9/11/2024)  obinutuzumab (GAZYVA) day 2 titrated infusion, 900 mg, Intravenous, Once, 1 of 1 cycle  Administration: 900 mg (9/11/2024)  obinutuzumab (GAZYVA) subsequent titrated infusion, 1,000 mg, Intravenous, Once, 2 of 6 cycles  Administration: 1,000 mg (9/18/2024), 1,000 mg (9/26/2024), 1,000 mg (10/24/2024)         Past Medical History:   Past Medical History:   Diagnosis Date    Alcoholic cirrhosis of liver without ascites (HCC) 10/29/2024    Anxiety     BPH (benign prostatic hypertrophy)     Cancer (HCC)     CLL    CLL (chronic lymphocytic leukemia) (HCC)     2009    Colon polyp     Concussion     Resolved: 08/22/16    COVID-19 12/29/2023    Depression     Diverticulitis 01/13/2020 2014 CT done 11/19 12/19 CT done     E coli bacteremia 06/27/2021 2/2 blood cultures with Ecoli  Source appears to be the urine     Epididymitis 05/19/2021 5/2021    Gastrointestinal hemorrhage     Last assessed: 08/27/13    GERD (gastroesophageal reflux disease)     H/O vitamin D deficiency 07/07/2021    Hearing loss of aging     Hiatal hernia     History of right hip replacement 04/19/2021     History of transfusion     Hyperlipidemia     Hypertension     Hyponatremia 03/06/2023    Iron deficiency anemia     Livedo reticularis 09/02/2024    Microscopic hematuria     Last assessed: 06/28/13    OA (osteoarthritis)     right hip    Obesity (BMI 30.0-34.9) 04/07/2022    Pneumothorax     Primary osteoarthritis of right hip 09/29/2017    He is status post right hip arthroplasty    Prostatitis     Pulmonary hypertension (HCC)     QT prolongation     Seasonal allergies     Sepsis ([facility]) 03/06/2023    Unresponsive episode 03/07/2023    Urinary tract infection associated with catheterization of urinary tract  ([facility]) 02/05/2021    Pseudomonal UTI asymptomatic.  Per Urology do not treat at this time.    Urinary tract infection associated with catheterization of urinary tract  ([facility]) 02/05/2021    Pseudomonal UTI asymptomatic.  Per Urology do not treat at this time.  He did have urosepsis from E coli 7/21    UTI (urinary tract infection)     in past    Ventricular fibrillation ([facility]) 03/06/2023    Noted after unresponsive episode      Wears glasses        Past Surgical History:   Procedure Laterality Date    ADENOIDECTOMY      BLADDER SURGERY      CARDIAC CATHETERIZATION Left 03/07/2023    Procedure: Cardiac Left Heart Cath;  Surgeon: Rich Ramirez MD;  Location: [facility];  Service: Cardiology    CARDIAC CATHETERIZATION N/A 03/07/2023    Procedure: Cardiac temporary pacemaker;  Surgeon: Rich Ramirez MD;  Location: [facility];  Service: Cardiology    CARDIAC ELECTROPHYSIOLOGY PROCEDURE N/A 03/08/2023    Procedure: Cardiac icd implant;  Surgeon: Filiberto Olsen MD;  Location: [facility];  Service: Cardiology    COLONOSCOPY      CYSTOSCOPY  10/09/2014    Diagnostic    CYSTOSCOPY  06/29/2020    FL CYSTOGRAM  08/15/2022    FRACTURE SURGERY      left lower arm    FRACTURE SURGERY      left femur    HERNIA REPAIR      IR BIOPSY BONE MARROW  9/9/2024    JOINT REPLACEMENT Right     hip    OTHER SURGICAL  "HISTORY  2011    Spinal anesthesia epidural    NE ARTHRP ACETBLR/PROX FEM PROSTC AGRFT/ALGRFT Right 2017    Procedure: ARTHROPLASTY HIP TOTAL ANTERIOR;  Surgeon: Roly Liu MD;  Location: [facility];  Service: Orthopedics    TONSILLECTOMY AND ADENOIDECTOMY      TRANSURETHRAL RESECTION OF PROSTATE      x 2    WRIST SURGERY         Family History   Problem Relation Age of Onset    No Known Problems Mother     Heart attack Father     Diabetes Brother     Prostate cancer Brother        Social History     Socioeconomic History    Marital status: /Civil Union     Spouse name: Not on file    Number of children: Not on file    Years of education: Not on file    Highest education level: Not on file   Occupational History    Not on file   Tobacco Use    Smoking status: Former     Current packs/day: 0.00     Types: Cigarettes     Quit date: 1980     Years since quittin.2     Passive exposure: Past    Smokeless tobacco: Never   Vaping Use    Vaping status: Never Used   Substance and Sexual Activity    Alcohol use: Yes     Alcohol/week: 2.0 - 4.0 standard drinks of alcohol     Types: 2 - 4 Cans of beer per week    Drug use: Not Currently     Types: Marijuana     Comment: \"medical marijuana\"    Sexual activity: Not Currently   Other Topics Concern    Not on file   Social History Narrative    Not on file     Social Determinants of Health     Financial Resource Strain: Low Risk  (10/23/2023)    Overall Financial Resource Strain (CARDIA)     Difficulty of Paying Living Expenses: Not hard at all   Food Insecurity: No Food Insecurity (2024)    Hunger Vital Sign     Worried About Running Out of Food in the Last Year: Never true     Ran Out of Food in the Last Year: Never true   Transportation Needs: No Transportation Needs (2024)    PRAPARE - Transportation     Lack of Transportation (Medical): No     Lack of Transportation (Non-Medical): No   Physical Activity: Not on file   Stress: Not on file "   Social Connections: Not on file   Intimate Partner Violence: Not on file   Housing Stability: Low Risk  (11/1/2024)    Housing Stability Vital Sign     Unable to Pay for Housing in the Last Year: No     Number of Times Moved in the Last Year: 1     Homeless in the Last Year: No         Current Facility-Administered Medications:     allopurinol (ZYLOPRIM) tablet 300 mg, 300 mg, Oral, Daily, eHrnando Barfield MD, 300 mg at 11/07/24 0804    ascorbic acid (VITAMIN C) tablet 500 mg, 500 mg, Oral, Daily, Hernando Barfield MD, 500 mg at 11/06/24 2033    atorvastatin (LIPITOR) tablet 20 mg, 20 mg, Oral, Daily, Hernando Barfield MD, 20 mg at 11/06/24 2033    cyanocobalamin (VITAMIN B-12) tablet 500 mcg, 500 mcg, Oral, Daily, Hernando Barfield MD, 500 mcg at 11/06/24 2033    dabigatran etexilate (PRADAXA) capsule 150 mg, 150 mg, Oral, BID, Hernando Barfield MD, 150 mg at 11/07/24 0804    fish oil capsule 1,000 mg, 1,000 mg, Oral, Daily, Hernando Barfield MD, 1,000 mg at 11/06/24 2033    furosemide (LASIX) tablet 20 mg, 20 mg, Oral, Daily, Hernando Barfield MD, 20 mg at 11/07/24 0804    lactobacillus acidophilus-bulgaricus (FLORANEX) packet 1 packet, 1 packet, Oral, Daily With Dinner, Hernando Barfield MD, 1 packet at 11/06/24 2033    LORazepam (ATIVAN) tablet 0.5 mg, 0.5 mg, Oral, HS PRN, Hernando Barfield MD, 0.5 mg at 11/06/24 2226    multivitamin-minerals (CENTRUM) tablet 1 tablet, 1 tablet, Oral, Daily, Hernando Barfield MD, 1 tablet at 11/07/24 0804    nadolol (CORGARD) tablet 80 mg, 80 mg, Oral, Early Morning, Hernando Barfield MD, 80 mg at 11/07/24 0805    pantoprazole (PROTONIX) EC tablet 40 mg, 40 mg, Oral, Early Morning, Hernando Barfield MD, 40 mg at 11/07/24 0804    Venetoclax TBPK 20 mg, 20 mg, Oral, Daily, Suhas Boyer DO, 20 mg at 11/07/24 0932      Medications Prior to Admission:     allopurinol (ZYLOPRIM) 300 mg tablet    ascorbic acid (VITAMIN C) 250 MG CHEW    atorvastatin (LIPITOR) 20 mg tablet    dabigatran etexilate (PRADAXA) 150 mg capsu    furosemide (LASIX) 20  mg tablet    Lactobacillus (PROBIOTIC ACIDOPHILUS PO)    LORazepam (ATIVAN) 0.5 mg tablet    nadolol (CORGARD) 80 MG tablet    Omega-3 Fatty Acids (FISH OIL) 1,000 mg    omeprazole (PriLOSEC) 40 MG capsule    zolpidem (AMBIEN) 5 mg tablet    acetaminophen (TYLENOL) 325 mg tablet    benzonatate (TESSALON) 200 MG capsule    Blood Pressure Monitoring (Omron 5 Series BP Monitor) PEE    montelukast (SINGULAIR) 10 mg tablet    Venetoclax 10 & 50 & 100 MG TBPK    Venetoclax 100 MG TABS    vitamin B-12 (CYANOCOBALAMIN) 500 MCG TABS    Allergies   Allergen Reactions    Ciprofloxacin Other (See Comments)     LONG QT syndrome    Codeine Other (See Comments)     agitated    Sulfamethoxazole-Trimethoprim GI Intolerance, Abdominal Pain and Other (See Comments)     upset stomach    Augmentin [Amoxicillin-Pot Clavulanate] Rash     Livedo reticularis    Macrobid [Nitrofurantoin] Dizziness, Headache and Fatigue     Per Patient         Physical Exam:     /73   Pulse 74   Temp 98 °F (36.7 °C)   Resp 18   Wt 89 kg (196 lb 3.2 oz)   SpO2 97%   BMI 29.83 kg/m²     Physical Exam  Vitals reviewed.   Constitutional:       General: He is not in acute distress.     Appearance: He is not ill-appearing, toxic-appearing or diaphoretic.   HENT:      Head: Normocephalic and atraumatic.      Mouth/Throat:      Mouth: Mucous membranes are moist.      Pharynx: No oropharyngeal exudate or posterior oropharyngeal erythema.   Eyes:      General: No scleral icterus.     Extraocular Movements: Extraocular movements intact.      Conjunctiva/sclera: Conjunctivae normal.   Cardiovascular:      Rate and Rhythm: Normal rate and regular rhythm.      Heart sounds: No murmur heard.     No gallop.   Pulmonary:      Effort: No respiratory distress.      Breath sounds: Normal breath sounds. No wheezing, rhonchi or rales.   Abdominal:      General: Bowel sounds are normal. There is no distension.      Palpations: Abdomen is soft. There is no mass.       Tenderness: There is no abdominal tenderness.   Musculoskeletal:         General: No swelling, tenderness or deformity.      Cervical back: Normal range of motion. No rigidity.      Right lower leg: No edema.      Left lower leg: No edema.   Lymphadenopathy:      Cervical: No cervical adenopathy.   Skin:     General: Skin is warm and dry.      Findings: No bruising, erythema, lesion or rash.   Neurological:      General: No focal deficit present.      Mental Status: He is alert and oriented to person, place, and time.   Psychiatric:         Mood and Affect: Mood normal.         Judgment: Judgment normal.         Bronchoscopy    Result Date: 10/15/2024  Narrative: Table formatting from the original result was not included. [facility] [address] [facility] [address] [phone] DATE OF SERVICE: 10/15/24 PHYSICIAN(S): Attending: Darrell Torrez MD INDICATION: Pneumonia of right middle lobe due to infectious organism POST-OP DIAGNOSIS: See the impression below. PREPROCEDURE: Standard airway preparation completed per respiratory therapy protocol.  Informed consent was obtained. Images reviewed prior to the procedure.  A Time Out was performed. No suspicion or identified risk for TB or other airborne infectious disease; bronchoscopy procedure being performed for diagnostic purposes. PROCEDURE: Bronchoscopy with bronchioalveolarlavage and brushing x 2 DETAILS OF PROCEDURE: Patient was taken to the procedure room where a time out was performed to confirm correct patient and correct procedure. The patient underwent general anesthesia, which was administered by an anesthesia professional. The patient's blood pressure, heart rate, level of consciousness, oxygen, ETCO2, ECG and respirations were monitored throughout the procedure. The patient's estimated blood loss was minimal (<5 mL). The scope was introduced through the laryngeal mask airway. The procedure was not difficult. The patient  tolerated the procedure well. There were no apparent adverse events. ANESTHESIA INFORMATION: ASA: III Anesthesia Type: General FINDINGS: Deformity in the upper trachea. Tortuous upper trachea anatomy The middle trachea, lower trachea, main mar, left lung and right lung appeared normal. Minimal, thin and clear secretions present in all observed locations, including the pharynx, larynx, vocal cords, trachea, main mar, left lung and right lung; secretions were easily removed; performed washing using 40 mL of saline with a total return of 10 mL, sent sample for microbiology analysis Bronchoalveolar lavage was performed x3 in the right middle lobar bronchus with 180 mL of saline instilled and a total return of 35 mL. The fluid appeared clear. Performed 2 brushings with cytology brush in the right middle lobar bronchus SPECIMENS: ID Type Source Tests Collected by Time Destination 1 :  Brushing Lung, Right Middle Lobe Bronchial Brushing PULMONARY CYTOLOGY Darrell Torrez MD 10/15/2024 12:21 PM  2 :  Lavage Lung, Right Middle Lobe Bronchoalveolar Lavage PULMONARY CYTOLOGY Darrell Torrez MD 10/15/2024 12:28 PM  A :  Bronchial Lung, Right Middle Lobe Bronchial Washing BRONCHIAL CULTURE AND GRAM STAIN, AFB CULTURE WITH STAIN Darrell Torrez MD 10/15/2024 12:24 PM  B :  Bronchial Lung, Right Middle Lobe Bronchoalveolar Lavage FUNGAL CULTURE, BRONCHIAL CULTURE AND GRAM STAIN, AFB CULTURE WITH STAIN, PNEUMOCYSTIS PCR Darrell Torrez MD 10/15/2024 12:30 PM  C :  Lavage Lung, Right Middle Lobe Bronchoalveolar Lavage BRONCHOALVEOLAR LAVAGE (BAL) DIFFERENTIAL Darrell Torrez MD 10/15/2024 12:31 PM  D :  Lavage Lung, Right Middle Lobe Bronchoalveolar Lavage DIRECT RESPIRATORY TB PCR ([place] [facility]) Darrell Torrez MD 10/15/2024 12:32 PM       Impression: Deformity in the upper trachea. Tortuous upper trachea anatomy The middle trachea, lower trachea, main mar, left lung and right lung appeared normal.  Minimal, thin and clear secretions present in all observed locations, including the pharynx, larynx, vocal cords, trachea, main mar, left lung and right lung; performed washing, sent sample for microbiology analysis Bronchoalveolar lavage was performed x1 in the right middle lobar bronchus Performed brushings with cytology brush in the right middle lobar bronchus RECOMMENDATION: Follow up with me in clinic Await microbiology Await cytopathology  Darrell Torrez MD       Labs and pertinent reports reviewed.

## 2024-11-07 NOTE — PLAN OF CARE
Problem: PAIN - ADULT  Goal: Verbalizes/displays adequate comfort level or baseline comfort level  Description: Interventions:  - Encourage patient to monitor pain and request assistance  - Assess pain using appropriate pain scale  - Administer analgesics based on type and severity of pain and evaluate response  - Implement non-pharmacological measures as appropriate and evaluate response  - Consider cultural and social influences on pain and pain management  - Notify physician/advanced practitioner if interventions unsuccessful or patient reports new pain  Outcome: Progressing     Problem: INFECTION - ADULT  Goal: Absence or prevention of progression during hospitalization  Description: INTERVENTIONS:  - Assess and monitor for signs and symptoms of infection  - Monitor lab/diagnostic results  - Monitor all insertion sites, i.e. indwelling lines, tubes, and drains  - Monitor endotracheal if appropriate and nasal secretions for changes in amount and color  - [place] appropriate cooling/warming therapies per order  - Administer medications as ordered  - Instruct and encourage patient and family to use good hand hygiene technique  - Identify and instruct in appropriate isolation precautions for identified infection/condition  Outcome: Progressing  Goal: Absence of fever/infection during neutropenic period  Description: INTERVENTIONS:  - Monitor WBC    Outcome: Progressing     Problem: SAFETY ADULT  Goal: Patient will remain free of falls  Description: INTERVENTIONS:  - Educate patient/family on patient safety including physical limitations  - Instruct patient to call for assistance with activity   - Consult OT/PT to assist with strengthening/mobility   - Keep Call bell within reach  - Keep bed low and locked with side rails adjusted as appropriate  - Keep care items and personal belongings within reach  - Initiate and maintain comfort rounds  - Make Fall Risk Sign visible to staff  - Offer Toileting every 2 Hours,  in advance of need  - Initiate/Maintain bed/chair alarm  - Obtain necessary fall risk management equipment  - Apply yellow socks and bracelet for high fall risk patients  - Consider moving patient to room near nurses station  Outcome: Progressing  Goal: Maintain or return to baseline ADL function  Description: INTERVENTIONS:  -  Assess patient's ability to carry out ADLs; assess patient's baseline for ADL function and identify physical deficits which impact ability to perform ADLs (bathing, care of mouth/teeth, toileting, grooming, dressing, etc.)  - Assess/evaluate cause of self-care deficits   - Assess range of motion  - Assess patient's mobility; develop plan if impaired  - Assess patient's need for assistive devices and provide as appropriate  - Encourage maximum independence but intervene and supervise when necessary  - Involve family in performance of ADLs  - Assess for home care needs following discharge   - Consider OT consult to assist with ADL evaluation and planning for discharge  - Provide patient education as appropriate  Outcome: Progressing  Goal: Maintains/Returns to pre admission functional level  Description: INTERVENTIONS:  - Perform AM-PAC 6 Click Basic Mobility/ Daily Activity assessment daily.  - Set and communicate daily mobility goal to care team and patient/family/caregiver.   - Collaborate with rehabilitation services on mobility goals if consulted  - Perform Range of Motion 3 times a day.  - Reposition patient every 2 hours.  - Dangle patient 3 times a day  - Stand patient 3 times a day  - Ambulate patient 3 times a day  - Out of bed to chair 3 times a day   - Out of bed for meals 3 times a day  - Out of bed for toileting  - Record patient progress and toleration of activity level   Outcome: Progressing     Problem: DISCHARGE PLANNING  Goal: Discharge to home or other facility with appropriate resources  Description: INTERVENTIONS:  - Identify barriers to discharge w/patient and  caregiver  - Arrange for needed discharge resources and transportation as appropriate  - Identify discharge learning needs (meds, wound care, etc.)  - Arrange for interpretive services to assist at discharge as needed  - Refer to Case Management Department for coordinating discharge planning if the patient needs post-hospital services based on physician/advanced practitioner order or complex needs related to functional status, cognitive ability, or social support system  Outcome: Progressing     Problem: Knowledge Deficit  Goal: Patient/family/caregiver demonstrates understanding of disease process, treatment plan, medications, and discharge instructions  Description: Complete learning assessment and assess knowledge base.  Interventions:  - Provide teaching at level of understanding  - Provide teaching via preferred learning methods  Outcome: Progressing

## 2024-11-07 NOTE — CASE MANAGEMENT
Case Management Discharge Planning Note    Patient name Murphy Willams  Location S /S -01 MRN [id number]  : 1950 Date 2024       Current Admission Date: 2024  Current Admission Diagnosis:CLL (chronic lymphocytic leukemia) (HCC)   Patient Active Problem List    Diagnosis Date Noted Date Diagnosed    Insomnia 2024     Hepatosplenomegaly 10/29/2024     Hyperbilirubinemia 10/29/2024     Hyperkalemia 2024     Anemia 2024     Livedo reticularis 2024     Arthritis 10/25/2023     Vitamin B 12 deficiency 10/25/2023     Squamous cell carcinoma of trunk 10/23/2023     Polyp of colon 2023     Chronic combined systolic and diastolic congestive heart failure (HCC) 2023     Long QT syndrome type 2 03/10/2023     Narrow complex tachycardia (HCC) 2023     Paroxysmal A-fib (HCC) 2023     Hyponatremia 2023     Hyperglycemia 10/17/2022     Diverticulum of bladder 2022     Encysted hydrocele 2021     Autoimmune hemolytic anemia (HCC) 2021     GAVE (gastric antral vascular ectasia) 2021     BPH with obstruction/lower urinary tract symptoms 2020     Hypercholesterolemia 2019     Depression, recurrent (HCC) 2019     DIXIE not currently treated 10/17/2018     DDD (degenerative disc disease), cervical 08/15/2018     Essential hypertension 2017     CLL (chronic lymphocytic leukemia) (HCC) 2017     Pulmonary hypertension (HCC) 05/15/2017     Spinal stenosis of lumbar region 05/15/2017     Erectile dysfunction of non-organic origin 2013     Gastroesophageal reflux disease without esophagitis 2012     Allergic rhinitis due to pollen 2012       LOS (days): 1  Geometric Mean LOS (GMLOS) (days): 3.9  Days to GMLOS:2.8     OBJECTIVE:  Risk of Unplanned Readmission Score: 21.77         Current admission status: Inpatient   Preferred Pharmacy:   [facility] [place] - [address]  [address]  [place] [address]  Phone: [phone] Fax: [phone]    [id number] - [place] - [address]  [address]  [place] [address]  Phone: [phone] Fax: [phone]    [facility] - [place] [place] - [address]  [address]  [address]  [place] [address]  Phone: [phone] Fax: [phone]    Primary Care Provider: Pravin Ortega Jr, MD    Primary Insurance: MEDICARE  Secondary Insurance: BLUE CROSS    DISCHARGE DETAILS:    Discharge planning discussed with:: Patient  Freedom of Choice: Yes  Comments - Freedom of Choice: Return home  CM contacted family/caregiver?: Yes (Spouse present at bedside)  Were Treatment Team discharge recommendations reviewed with patient/caregiver?: Yes  Did patient/caregiver verbalize understanding of patient care needs?: Yes       Contacts  Patient Contacts: Patient, spouse  Contact Method: In Person  Reason/Outcome: Continuity of Care, Emergency Contact, Discharge Planning    Requested Home Health Care         Is the patient interested in HHC at discharge?: No    DME Referral Provided  Referral made for DME?: No    Other Referral/Resources/Interventions Provided:  Interventions: Transportation, Other (Specify)  Referral Comments: CM notified that pt requires a Lyft transport home today. CM requested an 11:30am Lyft via [employer]. CM spoke with pt at bedside to notify of same. CM obtained [employer] waiver signature and placed form in scan bin on unit to be uploaded to chart. Pt aware CM requested   pt at hospital main entrance. CM notified provider and primary nurse of same.    Treatment Team Recommendation: Home  Discharge Destination Plan:: Home  Transport at Discharge : Ride Share     Number/Name of Dispatcher: [employer] [phone]  Transported by (Company and Unit #): [employer]  ETA of Transport (Date): 11/07/24  ETA of Transport (Time): 1130

## 2024-11-07 NOTE — TELEPHONE ENCOUNTER
Pt calls in and states that he had Bronchoscopy 3 weeks ago and he has this tickle feeling that makes him want to cough he is wondering if this can be normal at this time. Please advise

## 2024-11-07 NOTE — PLAN OF CARE
Problem: PAIN - ADULT  Goal: Verbalizes/displays adequate comfort level or baseline comfort level  Description: Interventions:  - Encourage patient to monitor pain and request assistance  - Assess pain using appropriate pain scale  - Administer analgesics based on type and severity of pain and evaluate response  - Implement non-pharmacological measures as appropriate and evaluate response  - Consider cultural and social influences on pain and pain management  - Notify physician/advanced practitioner if interventions unsuccessful or patient reports new pain  Outcome: Completed     Problem: INFECTION - ADULT  Goal: Absence or prevention of progression during hospitalization  Description: INTERVENTIONS:  - Assess and monitor for signs and symptoms of infection  - Monitor lab/diagnostic results  - Monitor all insertion sites, i.e. indwelling lines, tubes, and drains  - Monitor endotracheal if appropriate and nasal secretions for changes in amount and color  - [place] appropriate cooling/warming therapies per order  - Administer medications as ordered  - Instruct and encourage patient and family to use good hand hygiene technique  - Identify and instruct in appropriate isolation precautions for identified infection/condition  Outcome: Completed  Goal: Absence of fever/infection during neutropenic period  Description: INTERVENTIONS:  - Monitor WBC    Outcome: Completed     Problem: SAFETY ADULT  Goal: Patient will remain free of falls  Description: INTERVENTIONS:  - Educate patient/family on patient safety including physical limitations  - Instruct patient to call for assistance with activity   - Consult OT/PT to assist with strengthening/mobility   - Keep Call bell within reach  - Keep bed low and locked with side rails adjusted as appropriate  - Keep care items and personal belongings within reach  - Initiate and maintain comfort rounds  - Make Fall Risk Sign visible to staff  - Offer Toileting every  Hours, in  advance of need  - Initiate/Maintain alarm  - Obtain necessary fall risk management equipment:   - Apply yellow socks and bracelet for high fall risk patients  - Consider moving patient to room near nurses station  Outcome: Completed  Goal: Maintain or return to baseline ADL function  Description: INTERVENTIONS:  -  Assess patient's ability to carry out ADLs; assess patient's baseline for ADL function and identify physical deficits which impact ability to perform ADLs (bathing, care of mouth/teeth, toileting, grooming, dressing, etc.)  - Assess/evaluate cause of self-care deficits   - Assess range of motion  - Assess patient's mobility; develop plan if impaired  - Assess patient's need for assistive devices and provide as appropriate  - Encourage maximum independence but intervene and supervise when necessary  - Involve family in performance of ADLs  - Assess for home care needs following discharge   - Consider OT consult to assist with ADL evaluation and planning for discharge  - Provide patient education as appropriate  Outcome: Completed  Goal: Maintains/Returns to pre admission functional level  Description: INTERVENTIONS:  - Perform AM-PAC 6 Click Basic Mobility/ Daily Activity assessment daily.  - Set and communicate daily mobility goal to care team and patient/family/caregiver.   - Collaborate with rehabilitation services on mobility goals if consulted  - Perform Range of Motion  times a day.  - Reposition patient every  hours.  - Dangle patient  times a day  - Stand patient  times a day  - Ambulate patient  times a day  - Out of bed to chair  times a day   - Out of bed for meals times a day  - Out of bed for toileting  - Record patient progress and toleration of activity level   Outcome: Completed     Problem: DISCHARGE PLANNING  Goal: Discharge to home or other facility with appropriate resources  Description: INTERVENTIONS:  - Identify barriers to discharge w/patient and caregiver  - Arrange for needed  discharge resources and transportation as appropriate  - Identify discharge learning needs (meds, wound care, etc.)  - Arrange for interpretive services to assist at discharge as needed  - Refer to Case Management Department for coordinating discharge planning if the patient needs post-hospital services based on physician/advanced practitioner order or complex needs related to functional status, cognitive ability, or social support system  Outcome: Completed     Problem: Knowledge Deficit  Goal: Patient/family/caregiver demonstrates understanding of disease process, treatment plan, medications, and discharge instructions  Description: Complete learning assessment and assess knowledge base.  Interventions:  - Provide teaching at level of understanding  - Provide teaching via preferred learning methods  Outcome: Completed

## 2024-11-07 NOTE — DISCHARGE INSTR - AVS FIRST PAGE
Dear Murphy Willams,     It was our pleasure to care for you here at [facility].  It is our hope that we were always able to exceed the expected standards for your care during your stay.  You were hospitalized due to initiation of Venetoclax.  You were cared for on the fourth floor by Clarice Tomlin MD under the service of Gladis Dumont* with the [facility] who covers for your primary care physician (PCP), Pravin Ortega Jr, MD, while you were hospitalized.  If you have any questions or concerns related to this hospitalization, you may contact us at .  For follow up as well as any medication refills, we recommend that you follow up with your primary care physician.  A registered nurse will reach out to you by phone within a few days after your discharge to answer any additional questions that you may have after going home.  However, at this time we provide for you here, the most important instructions / recommendations at discharge:     Notable Medication Adjustments -   Continue taking the rest of your medications as prescribed.  Continue taking Venetoclax as prescribed.  Week 1: Oral: 20 mg once daily.  Week 2: Oral: 50 mg once daily.  Week 3: Oral: 100 mg once daily.  Week 4: Oral: 200 mg once daily  Week 5: Oral: 400 mg once daily.  Testing Required after Discharge -   Weekly Complete Blood Count, Complete Metabolic Panel, LDH, Uric Elia, and Phosphorus   ** Please contact your Oncologist's office to request testing orders for any of the testing recommended here **  Important follow up information -   Please follow-up with your Oncologist, Dr. Hanley, as scheduled  Please follow-up with your primary provider at the earliest appointment for continuation of care    Please review this entire after visit summary as additional general instructions including medication list, appointments, activity, diet, any pertinent  wound care, and other additional recommendations from your care team that may be provided for you.      Sincerely,     Clarice Tomlin MD

## 2024-11-08 LAB
BACTERIA BRONCH AEROBE CULT: NORMAL
GRAM STN SPEC: NORMAL

## 2024-11-11 ENCOUNTER — APPOINTMENT (OUTPATIENT)
Dept: LAB | Age: 74
End: 2024-11-11
Payer: MEDICARE

## 2024-11-11 DIAGNOSIS — C91.10 CLL (CHRONIC LYMPHOCYTIC LEUKEMIA) (HCC): ICD-10-CM

## 2024-11-11 LAB
ALBUMIN SERPL BCG-MCNC: 4.3 G/DL (ref 3.5–5)
ALP SERPL-CCNC: 97 U/L (ref 34–104)
ALT SERPL W P-5'-P-CCNC: 17 U/L (ref 7–52)
ANION GAP SERPL CALCULATED.3IONS-SCNC: 10 MMOL/L (ref 4–13)
AST SERPL W P-5'-P-CCNC: 20 U/L (ref 13–39)
BASOPHILS # BLD AUTO: 0.04 THOUSANDS/ÂΜL (ref 0–0.1)
BASOPHILS NFR BLD AUTO: 1 % (ref 0–1)
BILIRUB SERPL-MCNC: 0.98 MG/DL (ref 0.2–1)
BUN SERPL-MCNC: 9 MG/DL (ref 5–25)
CALCIUM SERPL-MCNC: 9.1 MG/DL (ref 8.4–10.2)
CHLORIDE SERPL-SCNC: 99 MMOL/L (ref 96–108)
CO2 SERPL-SCNC: 28 MMOL/L (ref 21–32)
CREAT SERPL-MCNC: 0.73 MG/DL (ref 0.6–1.3)
EOSINOPHIL # BLD AUTO: 0.14 THOUSAND/ÂΜL (ref 0–0.61)
EOSINOPHIL NFR BLD AUTO: 2 % (ref 0–6)
ERYTHROCYTE [DISTWIDTH] IN BLOOD BY AUTOMATED COUNT: 18.5 % (ref 11.6–15.1)
FUNGUS SPEC CULT: NORMAL
GFR SERPL CREATININE-BSD FRML MDRD: 92 ML/MIN/1.73SQ M
GLUCOSE P FAST SERPL-MCNC: 115 MG/DL (ref 65–99)
HCT VFR BLD AUTO: 29.2 % (ref 36.5–49.3)
HGB BLD-MCNC: 9.9 G/DL (ref 12–17)
IMM GRANULOCYTES # BLD AUTO: 0.06 THOUSAND/UL (ref 0–0.2)
IMM GRANULOCYTES NFR BLD AUTO: 1 % (ref 0–2)
LDH SERPL-CCNC: 327 U/L (ref 140–271)
LYMPHOCYTES # BLD AUTO: 1.28 THOUSANDS/ÂΜL (ref 0.6–4.47)
LYMPHOCYTES NFR BLD AUTO: 17 % (ref 14–44)
MCH RBC QN AUTO: 35.1 PG (ref 26.8–34.3)
MCHC RBC AUTO-ENTMCNC: 33.9 G/DL (ref 31.4–37.4)
MCV RBC AUTO: 104 FL (ref 82–98)
MONOCYTES # BLD AUTO: 0.62 THOUSAND/ÂΜL (ref 0.17–1.22)
MONOCYTES NFR BLD AUTO: 8 % (ref 4–12)
MYCOBACTERIUM SPEC CULT: NORMAL
MYCOBACTERIUM SPEC CULT: NORMAL
NEUTROPHILS # BLD AUTO: 5.64 THOUSANDS/ÂΜL (ref 1.85–7.62)
NEUTS SEG NFR BLD AUTO: 71 % (ref 43–75)
NRBC BLD AUTO-RTO: 0 /100 WBCS
PLATELET # BLD AUTO: 278 THOUSANDS/UL (ref 149–390)
PMV BLD AUTO: 9.7 FL (ref 8.9–12.7)
POTASSIUM SERPL-SCNC: 4.3 MMOL/L (ref 3.5–5.3)
PROT SERPL-MCNC: 7.2 G/DL (ref 6.4–8.4)
RBC # BLD AUTO: 2.82 MILLION/UL (ref 3.88–5.62)
RHODAMINE-AURAMINE STN SPEC: NORMAL
RHODAMINE-AURAMINE STN SPEC: NORMAL
SODIUM SERPL-SCNC: 137 MMOL/L (ref 135–147)
TSH SERPL DL<=0.05 MIU/L-ACNC: 1.61 UIU/ML (ref 0.45–4.5)
URATE SERPL-MCNC: 5.2 MG/DL (ref 3.5–8.5)
WBC # BLD AUTO: 7.78 THOUSAND/UL (ref 4.31–10.16)

## 2024-11-11 PROCEDURE — 85025 COMPLETE CBC W/AUTO DIFF WBC: CPT

## 2024-11-11 PROCEDURE — 80053 COMPREHEN METABOLIC PANEL: CPT

## 2024-11-11 PROCEDURE — 84550 ASSAY OF BLOOD/URIC ACID: CPT

## 2024-11-11 PROCEDURE — 83615 LACTATE (LD) (LDH) ENZYME: CPT

## 2024-11-11 PROCEDURE — 36415 COLL VENOUS BLD VENIPUNCTURE: CPT

## 2024-11-11 PROCEDURE — 84443 ASSAY THYROID STIM HORMONE: CPT

## 2024-11-11 RX ORDER — SODIUM CHLORIDE 9 MG/ML
20 INJECTION, SOLUTION INTRAVENOUS ONCE
Status: CANCELLED | OUTPATIENT
Start: 2024-11-21

## 2024-11-11 RX ORDER — ACETAMINOPHEN 325 MG/1
650 TABLET ORAL ONCE
Status: CANCELLED | OUTPATIENT
Start: 2024-11-21

## 2024-11-12 ENCOUNTER — OFFICE VISIT (OUTPATIENT)
Dept: FAMILY MEDICINE CLINIC | Facility: CLINIC | Age: 74
End: 2024-11-12
Payer: MEDICARE

## 2024-11-12 VITALS
WEIGHT: 195.4 LBS | OXYGEN SATURATION: 96 % | HEART RATE: 81 BPM | DIASTOLIC BLOOD PRESSURE: 72 MMHG | BODY MASS INDEX: 29.71 KG/M2 | SYSTOLIC BLOOD PRESSURE: 128 MMHG

## 2024-11-12 DIAGNOSIS — I10 ESSENTIAL HYPERTENSION: Chronic | ICD-10-CM

## 2024-11-12 DIAGNOSIS — C91.10 CLL (CHRONIC LYMPHOCYTIC LEUKEMIA) (HCC): Primary | Chronic | ICD-10-CM

## 2024-11-12 DIAGNOSIS — I48.0 PAROXYSMAL A-FIB (HCC): ICD-10-CM

## 2024-11-12 DIAGNOSIS — E87.1 HYPONATREMIA: ICD-10-CM

## 2024-11-12 DIAGNOSIS — D59.10 AUTOIMMUNE HEMOLYTIC ANEMIA (HCC): ICD-10-CM

## 2024-11-12 DIAGNOSIS — R73.9 HYPERGLYCEMIA: ICD-10-CM

## 2024-11-12 DIAGNOSIS — I50.42 CHRONIC COMBINED SYSTOLIC AND DIASTOLIC CONGESTIVE HEART FAILURE (HCC): ICD-10-CM

## 2024-11-12 DIAGNOSIS — R16.2 HEPATOSPLENOMEGALY: ICD-10-CM

## 2024-11-12 DIAGNOSIS — I27.20 PULMONARY HYPERTENSION (HCC): Chronic | ICD-10-CM

## 2024-11-12 DIAGNOSIS — E87.5 HYPERKALEMIA: ICD-10-CM

## 2024-11-12 DIAGNOSIS — F33.9 DEPRESSION, RECURRENT (HCC): ICD-10-CM

## 2024-11-12 DIAGNOSIS — J30.1 ALLERGIC RHINITIS DUE TO POLLEN, UNSPECIFIED SEASONALITY: ICD-10-CM

## 2024-11-12 DIAGNOSIS — I45.81 LONG QT SYNDROME TYPE 2: ICD-10-CM

## 2024-11-12 PROCEDURE — 99496 TRANSJ CARE MGMT HIGH F2F 7D: CPT | Performed by: FAMILY MEDICINE

## 2024-11-12 RX ORDER — MONTELUKAST SODIUM 10 MG/1
10 TABLET ORAL DAILY
Qty: 90 TABLET | Refills: 3 | Status: SHIPPED | OUTPATIENT
Start: 2024-11-12

## 2024-11-12 NOTE — PROGRESS NOTES
Transition of Care Visit  Name: Murphy Willams      : 1950      MRN: [id number]  Encounter Provider: Pravin Ortega Jr, MD  Encounter Date: 2024   Encounter department: [facility]    Assessment & Plan  CLL (chronic lymphocytic leukemia) (HCC)  Continue close follow-up with oncology.  Fortunately, he is doing quite well status post recent hospitalization for induction of venetoclax.       Allergic rhinitis due to pollen, unspecified seasonality  Refilled montelukast at patient's request.  Orders:    montelukast (SINGULAIR) 10 mg tablet; Take 1 tablet (10 mg total) by mouth daily    Chronic combined systolic and diastolic congestive heart failure (HCC)  Wt Readings from Last 3 Encounters:   24 88.6 kg (195 lb 6.4 oz)   24 89 kg (196 lb 3.2 oz)   24 88 kg (194 lb)     He seems quite stable from a cardiology perspective.               Autoimmune hemolytic anemia (HCC)  Continue close follow-up with heme-onc for this as well as CLL.       Hyperglycemia  Continue routine monitoring of A1c.  Recent labs look excellent in the hospital.       Depression, recurrent (HCC)  He has had a lot of stress lately but seems to be managing pretty well.         Hyperkalemia  He has labs pending for oncology       Hyponatremia  Labs are pending per oncology       Paroxysmal A-fib (HCC)  He does appear to be in sinus rhythm today.  Continue close follow-up with myself as well as cardiology.       Pulmonary hypertension (HCC)  Symptoms are stable at this time.       Hepatosplenomegaly  He relates cut back quite a bit on drinking.  Main risk factor is CLL at this point.       Long QT syndrome type 2  Stable clinically.  Patient is aware to mention with any prescriptions offered.       Essential hypertension  Well-controlled this time.            History of Present Illness     Transitional Care Management Review:   Murphy Willams is a 74 y.o. male here for TCM follow up.      During the TCM phone call patient stated:  TCM Call       Date and time call was made  11/7/2024  2:48 PM    Hospital care reviewed  Records reviewed    Patient was hospitial[facility] at  [facility]    Date of Admission  11/06/24    Date of discharge  11/07/24    Diagnosis  chronic lymphocytic leukemia    Disposition  Home    Were the patients medications reviewed and updated  No    Current Symptoms  None    Shortness of breath severity  Mild    Weakness severity  Mild    Fatigue severity  Moderate          TCM Call       Post hospital issues  None    Should patient be enrolled in anticoag monitoring?  No    Scheduled for follow up?  Yes  Dr Ortega 11.12.24    Did you obtain your prescribed medications  Yes    Do you need help managing your prescriptions or medications  No    Is transportation to your appointment needed  No    I have advised the patient to call PCP with any new or worsening symptoms  Thea W, RMA    Living Arrangements  Alone    Are you recieving any outpatient services  Yes    What type of services  Home Healt Care    Are you recieving home care services  Yes    Types of home care services  Hospice; Nurse visit          HPI patient presents today for TCM.  He was recent admitted for initiation of venetoclax for his CLL.  He was monitored closely for tumor lysis syndrome on the days above.  Fortunately, he did well and is not tolerating this medication.  He had a very good result overall from the medication.  He notes he is feeling pretty well today.  He has some fatigue.  He denies any night sweats or unintentional weight loss.  He has a history of cardiac disease and significant hypertension.  He denies any recent problems with palpitations or lightheadedness.  He has been very sedentary recently due to significant fatigue but is starting to advance his activity without cardiovascular concerns.  Review of Systems   Constitutional:  Positive for fatigue. Negative for appetite change,  chills, fever and unexpected weight change.   HENT:  Negative for trouble swallowing.    Eyes:  Negative for visual disturbance.   Respiratory:  Positive for cough. Negative for chest tightness, shortness of breath and wheezing.    Cardiovascular:  Negative for chest pain, palpitations and leg swelling.   Gastrointestinal:  Negative for abdominal distention, abdominal pain, blood in stool, constipation and diarrhea.   Endocrine: Negative for polyuria.   Genitourinary:  Negative for difficulty urinating and flank pain.   Musculoskeletal:  Negative for arthralgias and myalgias.   Skin:  Negative for rash.   Neurological:  Negative for dizziness and light-headedness.   Hematological:  Negative for adenopathy. Does not bruise/bleed easily.   Psychiatric/Behavioral:  Negative for dysphoric mood and sleep disturbance. The patient is not nervous/anxious.      Objective     /72   Pulse 81   Wt 88.6 kg (195 lb 6.4 oz)   SpO2 96%   BMI 29.71 kg/m²     Physical Exam  Constitutional:       General: He is not in acute distress.     Appearance: Normal appearance. He is well-developed. He is obese. He is not diaphoretic.   HENT:      Head: Normocephalic.      Right Ear: External ear normal.      Left Ear: External ear normal.      Nose: Nose normal.   Eyes:      General:         Right eye: No discharge.         Left eye: No discharge.      Conjunctiva/sclera: Conjunctivae normal.      Pupils: Pupils are equal, round, and reactive to light.   Neck:      Thyroid: No thyromegaly.      Trachea: No tracheal deviation.   Cardiovascular:      Rate and Rhythm: Normal rate and regular rhythm.      Heart sounds: Normal heart sounds. No murmur heard.     No friction rub.   Pulmonary:      Effort: Pulmonary effort is normal. No respiratory distress.      Breath sounds: Normal breath sounds. No wheezing.   Chest:      Chest wall: No tenderness.   Abdominal:      General: There is no distension.      Palpations: There is no mass.       Tenderness: There is no abdominal tenderness. There is no guarding or rebound.      Hernia: No hernia is present.   Musculoskeletal:         General: No swelling or deformity.      Cervical back: Normal range of motion.      Right lower leg: No edema.      Left lower leg: No edema.   Skin:     Findings: No erythema or rash.   Neurological:      General: No focal deficit present.      Mental Status: He is alert.      Cranial Nerves: No cranial nerve deficit.      Coordination: Coordination normal.   Psychiatric:         Thought Content: Thought content normal.       Medications have been reviewed by provider in current encounter

## 2024-11-13 ENCOUNTER — TELEPHONE (OUTPATIENT)
Age: 74
End: 2024-11-13

## 2024-11-13 NOTE — TELEPHONE ENCOUNTER
Called the patient and reviewed the need for hydration when increasing dose. Reviewed the PI below. Patient verbalized understanding.     Drink plenty of water during treatment with VENCLEXTA to help reduce your risk of getting TLS. Drink 6 to 8 glasses (about 56 ounces total) of water each day, starting 2 days before your first dose,  on the day of your first dose of VENCLEXTA, and each time your dose is increased.

## 2024-11-13 NOTE — TELEPHONE ENCOUNTER
Received a phone call from Aubrie who had called patient for a f/u since discharge from hospital.  Patient inquiring what the recommended fluid intake is while on Venetoclax.  Please call patient with update.

## 2024-11-14 NOTE — TELEPHONE ENCOUNTER
Pt calls in and states that results came through on Vinobo for a Bronchoscopy from a month ago. Wondering if anything new has came up. Pt has many complaints in regards to  Vinobo it has been sending him results weeks later. Pt would like to speak to manager in Mychart department. Wondering if we can get his message to our manager.

## 2024-11-15 ENCOUNTER — TELEPHONE (OUTPATIENT)
Dept: FAMILY MEDICINE CLINIC | Facility: CLINIC | Age: 74
End: 2024-11-15

## 2024-11-15 NOTE — TELEPHONE ENCOUNTER
Called pt gave him instruction of furosemide From Dr Castaneda and he was understanding of instruction.       ----- Message from Pravin Castaneda MD sent at 11/14/2024  6:12 PM EST -----  Staff, please let the patient know that I heard from cardiology.  He can stop the furosemide on a daily basis but should take a tablet if he gains 3 pounds in any given day or 5 pounds in any given week.  He should continue the furosemide until he returns to his current weight.  ----- Message -----  From: Eric Bueno DO  Sent: 11/14/2024  10:09 AM EST  To: Pravin Castaneda Jr., MD    Sure, I'm ok w/ PRN furosemide.  Thanks.      Eric  ----- Message -----  From: Pravin Castaneda Jr., MD  Sent: 11/12/2024   4:16 PM EST  To: Eric Bueno,     Good afternoon.  I hope all is well.  I saw Murphy today.  He was recently admitted for induction of CLL medication.  He has been doing quite well from a cardiac perspective.  He does note with furosemide he has to cath quite often which he finds very annoying.  I was wondering if you felt we could go with the as needed furosemide approach that if he gains 3 pounds he would take a dose.  Otherwise, he is certainly willing to proceed if we think that would be a bad idea.    Have a great rest of your week.    Faisal

## 2024-11-18 ENCOUNTER — APPOINTMENT (OUTPATIENT)
Dept: LAB | Facility: HOSPITAL | Age: 74
End: 2024-11-18
Payer: MEDICARE

## 2024-11-18 DIAGNOSIS — C91.10 CLL (CHRONIC LYMPHOCYTIC LEUKEMIA) (HCC): ICD-10-CM

## 2024-11-18 LAB
ALBUMIN SERPL BCG-MCNC: 4.4 G/DL (ref 3.5–5)
ALP SERPL-CCNC: 96 U/L (ref 34–104)
ALT SERPL W P-5'-P-CCNC: 14 U/L (ref 7–52)
ANION GAP SERPL CALCULATED.3IONS-SCNC: 5 MMOL/L (ref 4–13)
AST SERPL W P-5'-P-CCNC: 14 U/L (ref 13–39)
BASOPHILS # BLD AUTO: 0.02 THOUSANDS/ÂΜL (ref 0–0.1)
BASOPHILS NFR BLD AUTO: 0 % (ref 0–1)
BILIRUB SERPL-MCNC: 0.99 MG/DL (ref 0.2–1)
BUN SERPL-MCNC: 14 MG/DL (ref 5–25)
CALCIUM SERPL-MCNC: 9.3 MG/DL (ref 8.4–10.2)
CHLORIDE SERPL-SCNC: 101 MMOL/L (ref 96–108)
CO2 SERPL-SCNC: 29 MMOL/L (ref 21–32)
CREAT SERPL-MCNC: 0.77 MG/DL (ref 0.6–1.3)
EOSINOPHIL # BLD AUTO: 0.06 THOUSAND/ÂΜL (ref 0–0.61)
EOSINOPHIL NFR BLD AUTO: 1 % (ref 0–6)
ERYTHROCYTE [DISTWIDTH] IN BLOOD BY AUTOMATED COUNT: 18.3 % (ref 11.6–15.1)
FUNGUS SPEC CULT: NORMAL
GFR SERPL CREATININE-BSD FRML MDRD: 90 ML/MIN/1.73SQ M
GLUCOSE P FAST SERPL-MCNC: 123 MG/DL (ref 65–99)
HCT VFR BLD AUTO: 30.8 % (ref 36.5–49.3)
HGB BLD-MCNC: 10.9 G/DL (ref 12–17)
IMM GRANULOCYTES # BLD AUTO: 0.05 THOUSAND/UL (ref 0–0.2)
IMM GRANULOCYTES NFR BLD AUTO: 1 % (ref 0–2)
LDH SERPL-CCNC: 208 U/L (ref 140–271)
LYMPHOCYTES # BLD AUTO: 1.14 THOUSANDS/ÂΜL (ref 0.6–4.47)
LYMPHOCYTES NFR BLD AUTO: 17 % (ref 14–44)
MCH RBC QN AUTO: 36.5 PG (ref 26.8–34.3)
MCHC RBC AUTO-ENTMCNC: 35.4 G/DL (ref 31.4–37.4)
MCV RBC AUTO: 103 FL (ref 82–98)
MONOCYTES # BLD AUTO: 0.73 THOUSAND/ÂΜL (ref 0.17–1.22)
MONOCYTES NFR BLD AUTO: 11 % (ref 4–12)
NEUTROPHILS # BLD AUTO: 4.56 THOUSANDS/ÂΜL (ref 1.85–7.62)
NEUTS SEG NFR BLD AUTO: 70 % (ref 43–75)
NRBC BLD AUTO-RTO: 0 /100 WBCS
PHOSPHATE SERPL-MCNC: 3.7 MG/DL (ref 2.3–4.1)
PLATELET # BLD AUTO: 250 THOUSANDS/UL (ref 149–390)
PMV BLD AUTO: 8.6 FL (ref 8.9–12.7)
POTASSIUM SERPL-SCNC: 4.4 MMOL/L (ref 3.5–5.3)
PROT SERPL-MCNC: 7 G/DL (ref 6.4–8.4)
RBC # BLD AUTO: 2.99 MILLION/UL (ref 3.88–5.62)
SODIUM SERPL-SCNC: 135 MMOL/L (ref 135–147)
URATE SERPL-MCNC: 4.8 MG/DL (ref 3.5–8.5)
WBC # BLD AUTO: 6.56 THOUSAND/UL (ref 4.31–10.16)

## 2024-11-18 PROCEDURE — 84100 ASSAY OF PHOSPHORUS: CPT

## 2024-11-18 PROCEDURE — 80053 COMPREHEN METABOLIC PANEL: CPT

## 2024-11-18 PROCEDURE — 84550 ASSAY OF BLOOD/URIC ACID: CPT

## 2024-11-18 PROCEDURE — 36415 COLL VENOUS BLD VENIPUNCTURE: CPT

## 2024-11-18 PROCEDURE — 83615 LACTATE (LD) (LDH) ENZYME: CPT

## 2024-11-18 PROCEDURE — 85025 COMPLETE CBC W/AUTO DIFF WBC: CPT

## 2024-11-19 ENCOUNTER — OFFICE VISIT (OUTPATIENT)
Dept: HEMATOLOGY ONCOLOGY | Facility: CLINIC | Age: 74
End: 2024-11-19
Payer: MEDICARE

## 2024-11-19 VITALS
OXYGEN SATURATION: 96 % | TEMPERATURE: 97.3 F | HEIGHT: 68 IN | RESPIRATION RATE: 16 BRPM | HEART RATE: 94 BPM | BODY MASS INDEX: 29.55 KG/M2 | DIASTOLIC BLOOD PRESSURE: 80 MMHG | SYSTOLIC BLOOD PRESSURE: 128 MMHG | WEIGHT: 195 LBS

## 2024-11-19 DIAGNOSIS — E53.8 VITAMIN B 12 DEFICIENCY: ICD-10-CM

## 2024-11-19 DIAGNOSIS — Z95.0 PACEMAKER: ICD-10-CM

## 2024-11-19 DIAGNOSIS — M19.90 ARTHRITIS: ICD-10-CM

## 2024-11-19 DIAGNOSIS — C91.10 CLL (CHRONIC LYMPHOCYTIC LEUKEMIA) (HCC): Primary | ICD-10-CM

## 2024-11-19 DIAGNOSIS — I50.42 CHRONIC COMBINED SYSTOLIC AND DIASTOLIC CONGESTIVE HEART FAILURE (HCC): ICD-10-CM

## 2024-11-19 DIAGNOSIS — D59.10 AUTOIMMUNE HEMOLYTIC ANEMIA (HCC): ICD-10-CM

## 2024-11-19 DIAGNOSIS — I48.0 PAROXYSMAL A-FIB (HCC): ICD-10-CM

## 2024-11-19 DIAGNOSIS — E80.6 HYPERBILIRUBINEMIA: ICD-10-CM

## 2024-11-19 DIAGNOSIS — R16.2 HEPATOSPLENOMEGALY: ICD-10-CM

## 2024-11-19 DIAGNOSIS — I27.20 PULMONARY HYPERTENSION (HCC): ICD-10-CM

## 2024-11-19 PROCEDURE — G2211 COMPLEX E/M VISIT ADD ON: HCPCS | Performed by: INTERNAL MEDICINE

## 2024-11-19 PROCEDURE — 99214 OFFICE O/P EST MOD 30 MIN: CPT | Performed by: INTERNAL MEDICINE

## 2024-11-19 NOTE — PATIENT INSTRUCTIONS
Patient has standing orders for blood work.  He has instructions about venetoclax.  He receives Gazyva and no change there.  Follow-up in 2 months.

## 2024-11-19 NOTE — PROGRESS NOTES
HPI: Patient is here with his wife.  This is continuation of care  for CLL with anemia and patient was responding to Gazyva and to that venetoclax was added on 11/6/2024.  Patient continues to respond.  Tomorrow he was starting 100 mg dose of venetoclax and he will not go above 200 mg daily dose.  He is also on allopurinol.  He is not having problem tolerating Gazyva and venetoclax.  Counts are improving.  He has much less weakness and tiredness and exertional dyspnea.  No more swelling of the legs.  He states he has instructions to take Lasix only if he gains more than 3 pounds weight in a day and he weighs himself every morning before breakfast.  He did not have tumor lysis problem when started on venetoclax inpatient.     In September 2024 before starting Gazyva he was hospitalized with pneumonia and WBC count and lymphocyte count was more than 200,000 and he had profound anemia and there was question of GI bleeding but that was not proven.  He had received blood transfusions.  History of atrial fibrillation and he has been on Pradaxa.  He was not a good candidate for BTK inhibitor because of atrial fibrillation and Pradaxa.   He has chronically prolonged QT interval and has dual-chamber/biventricular AICD that was placed in March 2023.   He had generalized skin rash and he thought that was from Augmentin.Rash started after he was started on Augmentin and was there before he was started on allopurinol.  Much improvement in the rash.  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    CLL was diagnosed in 2010 and patient had not required therapy for that until now other than an episode of acute autoimmune profound hemolytic anemia in 2021 that was treated with prednisone and folic acid.      Patient had iron deficiency previously and had GI evaluation and had intravenous Venofer.  He follows with his gastroenterologist.   He has enlarged prostate and nocturia and he follows with  his urologist.      He also follows with his lung specialist for exertional dyspnea and COPD.  History of cirrhosis of liver.  He gives history of B12 deficiency and has been taking B12 orally.  He had bone marrow test in the hospital and that showed 50% infiltration with CLL in September 2024.          Current Outpatient Medications:     acetaminophen (TYLENOL) 325 mg tablet, Take 2 tablets (650 mg total) by mouth every 6 (six) hours as needed for mild pain, Disp: , Rfl:     allopurinol (ZYLOPRIM) 300 mg tablet, Take 1 tablet (300 mg total) by mouth daily, Disp: 90 tablet, Rfl: 1    ascorbic acid (VITAMIN C) 250 MG CHEW, Chew 250 mg daily, Disp: , Rfl:     atorvastatin (LIPITOR) 20 mg tablet, Take 1 tablet (20 mg total) by mouth daily, Disp: 100 tablet, Rfl: 3    Blood Pressure Monitoring (Omron 5 Series BP Monitor) PEE, , Disp: , Rfl:     dabigatran etexilate (PRADAXA) 150 mg capsu, Take 1 capsule (150 mg total) by mouth 2 (two) times a day, Disp: 180 capsule, Rfl: 3    furosemide (LASIX) 20 mg tablet, Take 1 tablet (20 mg total) by mouth daily, Disp: 90 tablet, Rfl: 5    Lactobacillus (PROBIOTIC ACIDOPHILUS PO), Take by mouth, Disp: , Rfl:     LORazepam (ATIVAN) 0.5 mg tablet, TAKE ONE TABLET BY MOUTH DAILY AS NEEDED for anxiety, Disp: 60 tablet, Rfl: 1    montelukast (SINGULAIR) 10 mg tablet, Take 1 tablet (10 mg total) by mouth daily, Disp: 90 tablet, Rfl: 3    nadolol (CORGARD) 80 MG tablet, Take 1 tablet (80 mg total) by mouth daily in the early morning, Disp: 90 tablet, Rfl: 3    Omega-3 Fatty Acids (FISH OIL) 1,000 mg, Take by mouth daily, Disp: , Rfl:     omeprazole (PriLOSEC) 40 MG capsule, Take one capsule by mouth once every day., Disp: 100 capsule, Rfl: 3    Venetoclax 10 & 50 & 100 MG TBPK, Will start in hospital  Take 20 mg by mouth daily for 7 days, then 50 mg daily for 7 days, then 100 mg daily for 7 days, then 200 mg daily thereafter, Disp: 42 each, Rfl: 0    Venetoclax 100 MG TABS, Take 2  tablets (200 mg total) by mouth daily, Disp: 60 tablet, Rfl: 5    zolpidem (AMBIEN) 5 mg tablet, Take 1/2-1 full tablet at bedtime as needed for sleep.  Do not take if drinking any alcohol or taking lorazepam, Disp: 90 tablet, Rfl: 0    vitamin B-12 (CYANOCOBALAMIN) 500 MCG TABS, Take 1 tablet (500 mcg total) by mouth daily, Disp: 30 tablet, Rfl: 0    Allergies   Allergen Reactions    Ciprofloxacin Other (See Comments)     LONG QT syndrome    Codeine Other (See Comments)     agitated    Sulfamethoxazole-Trimethoprim GI Intolerance, Abdominal Pain and Other (See Comments)     upset stomach    Augmentin [Amoxicillin-Pot Clavulanate] Rash     Livedo reticularis    Macrobid [Nitrofurantoin] Dizziness, Headache and Fatigue     Per Patient       Oncology History   CLL (chronic lymphocytic leukemia) ([facility])   9/29/2017 Initial Diagnosis    CLL (chronic lymphocytic leukemia) ([facility])     9/10/2024 -  Chemotherapy    alteplase (CATHFLO), 2 mg, Intracatheter, Every 1 Minute as needed, 2 of 6 cycles  obinutuzumab (GAZYVA) day 1 IVPB, 100 mg, Intravenous, Once, 1 of 1 cycle  Administration: 100 mg (9/11/2024)  obinutuzumab (GAZYVA) day 2 titrated infusion, 900 mg, Intravenous, Once, 1 of 1 cycle  Administration: 900 mg (9/11/2024)  obinutuzumab (GAZYVA) subsequent titrated infusion, 1,000 mg, Intravenous, Once, 2 of 6 cycles  Administration: 1,000 mg (9/18/2024), 1,000 mg (9/26/2024), 1,000 mg (10/24/2024)         ROS:  11/19/24 Reviewed 12 systems: See symptoms in HPI  Presently no other neurological, cardiac, pulmonary, GI and  symptoms other than mentioned in HPI.  Other symptoms are in HPI. No fever, chills, bleeding, bone pains,  night sweats,   numbness,  claudication and gait problem. No frequent infections except recent pneumonia.  Not unusually sensitive to heat or cold. No swelling of the ankles. No swollen glands.  Patient is anxious.       /80 (BP Location: Right arm, Patient Position: Sitting, Cuff Size: Adult)   " Pulse 94   Temp (!) 97.3 °F (36.3 °C) (Temporal)   Resp 16   Ht 5' 8\" (1.727 m)   Wt 88.5 kg (195 lb)   SpO2 96%   BMI 29.65 kg/m²     Physical Exam:  Patient is alert and oriented.  Patient is not in distress.  Vital signs are above.  There is no icterus.  There is no oral thrush.  There is no palpable neck mass.  Lung fields are clear to percussion and auscultation.  Heart rate is irregular.  He has AICD.  There is no palpable abdominal mass.  Abdomen is soft and nontender.  There is no ascites.  There is no edema of ankles.  No calf tenderness. Patient  has generalized weakness.  No focal neurological deficit.  Much improved skin rash that was mostly on the legs.  There is no palpable lymphadenopathy in the neck and axillary areas, no clubbing.    Patient is anxious.  Performance status 2.    IMAGING:  MPRESSION:     Mild hepatomegaly.. Possible 2 mm hemangioma.  Moderate splenomegaly.  Gallstone in the gallbladder.              Workstation performed: [id number]        Imaging    US abdomen complete (Order: [id number]) - 9/3/2024    IMPRESSION:     No pulmonary embolus.     Right middle lobe pneumonia.     Lack of opacification of the left atrial appendage. It cannot be determined if this is due to nonopacified blood in the nondependent portion of the left atrium or left atrial appendage thrombus.     Mild splenomegaly and minimally enlarged right axillary node, likely due to the patient's history of CLL.     The study was marked in EPIC for immediate notification.           Workstation performed: [id number]        Imaging    CTA ED chest PE study (Order: [id number]) - 9/2/2024  LABS:    Results for orders placed or performed in visit on 11/18/24   CBC and differential    Collection Time: 11/18/24  7:20 AM   Result Value Ref Range    WBC 6.56 4.31 - 10.16 Thousand/uL    RBC 2.99 (L) 3.88 - 5.62 Million/uL    Hemoglobin 10.9 (L) 12.0 - 17.0 g/dL    Hematocrit 30.8 (L) 36.5 - 49.3 %     (H) 82 - 98 fL    " MCH 36.5 (H) 26.8 - 34.3 pg    MCHC 35.4 31.4 - 37.4 g/dL    RDW 18.3 (H) 11.6 - 15.1 %    MPV 8.6 (L) 8.9 - 12.7 fL    Platelets 250 149 - 390 Thousands/uL    nRBC 0 /100 WBCs    Segmented % 70 43 - 75 %    Immature Grans % 1 0 - 2 %    Lymphocytes % 17 14 - 44 %    Monocytes % 11 4 - 12 %    Eosinophils Relative 1 0 - 6 %    Basophils Relative 0 0 - 1 %    Absolute Neutrophils 4.56 1.85 - 7.62 Thousands/µL    Absolute Immature Grans 0.05 0.00 - 0.20 Thousand/uL    Absolute Lymphocytes 1.14 0.60 - 4.47 Thousands/µL    Absolute Monocytes 0.73 0.17 - 1.22 Thousand/µL    Eosinophils Absolute 0.06 0.00 - 0.61 Thousand/µL    Basophils Absolute 0.02 0.00 - 0.10 Thousands/µL   Comprehensive metabolic panel    Collection Time: 11/18/24  7:20 AM   Result Value Ref Range    Sodium 135 135 - 147 mmol/L    Potassium 4.4 3.5 - 5.3 mmol/L    Chloride 101 96 - 108 mmol/L    CO2 29 21 - 32 mmol/L    ANION GAP 5 4 - 13 mmol/L    BUN 14 5 - 25 mg/dL    Creatinine 0.77 0.60 - 1.30 mg/dL    Glucose, Fasting 123 (H) 65 - 99 mg/dL    Calcium 9.3 8.4 - 10.2 mg/dL    AST 14 13 - 39 U/L    ALT 14 7 - 52 U/L    Alkaline Phosphatase 96 34 - 104 U/L    Total Protein 7.0 6.4 - 8.4 g/dL    Albumin 4.4 3.5 - 5.0 g/dL    Total Bilirubin 0.99 0.20 - 1.00 mg/dL    eGFR 90 ml/min/1.73sq m   Uric acid    Collection Time: 11/18/24  7:20 AM   Result Value Ref Range    Uric Acid 4.8 3.5 - 8.5 mg/dL   LD,Blood    Collection Time: 11/18/24  7:20 AM   Result Value Ref Range     140 - 271 U/L   Phosphorus    Collection Time: 11/18/24  7:20 AM   Result Value Ref Range    Phosphorus 3.7 2.3 - 4.1 mg/dL     *Note: Due to a large number of results and/or encounters for the requested time period, some results have not been displayed. A complete set of results can be found in Results Review.                       Labs, Imaging, & Other studies:   All pertinent labs and imaging studies were personally reviewed        Reviewed hospital records and discussed  with patient.  Assessment and plan: See diagnoses, orders and instructions below.  Patient is here with his wife.  This is continuation of care  for CLL with anemia and patient was responding to Gazyva and to that venetoclax was added on 11/6/2024.  Patient continues to respond.  Tomorrow he was starting 100 mg dose of venetoclax and he will not go above 200 mg daily dose.  He is also on allopurinol.  He is not having problem tolerating Gazyva and venetoclax.  Counts are improving.  He has much less weakness and tiredness and exertional dyspnea.  No more swelling of the legs.  He states he has instructions to take Lasix only if he gains more than 3 pounds weight in a day and he weighs himself every morning before breakfast.  He did not have tumor lysis problem when started on venetoclax inpatient.     In September 2024 before starting Gazyva he was hospitalized with pneumonia and WBC count and lymphocyte count was more than 200,000 and he had profound anemia and there was question of GI bleeding but that was not proven.  He had received blood transfusions.  History of atrial fibrillation and he has been on Pradaxa.  He was not a good candidate for BTK inhibitor because of atrial fibrillation and Pradaxa.   He has chronically prolonged QT interval and has dual-chamber/biventricular AICD that was placed in March 2023.   He had generalized skin rash and he thought that was from Augmentin.Rash started after he was started on Augmentin and was there before he was started on allopurinol.  Much improvement in the rash.  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    CLL was diagnosed in 2010 and patient had not required therapy for that until now other than an episode of acute autoimmune profound hemolytic anemia in 2021 that was treated with prednisone and folic acid.      Patient had iron deficiency previously and had GI evaluation and had intravenous Venofer.  He follows with his  gastroenterologist.   He has enlarged prostate and nocturia and he follows with his urologist.      He also follows with his lung specialist for exertional dyspnea and COPD.  History of cirrhosis of liver.  He gives history of B12 deficiency and has been taking B12 orally.  He had bone marrow test in the hospital and that showed 50% infiltration with CLL in September 2024.    Physical examination and test results are as recorded and discussed in detail.  Patient is being continued on Gazyva and venetoclax.  Venetoclax dose will be topped at 200 mg/day . Goal is to manage CLL with  anemia and prolongation of survival.  Patient is capable of self-care.  Diet and activities per patient's primary physician and other consultants especially cardiologists.  Patient to avoid falls and trauma.  All discussed in detail.  Questions answered.  Discussed health screening tests later on.    1. CLL (chronic lymphocytic leukemia) (HCC) (Primary)    - CBC and differential; Standing  - Comprehensive metabolic panel; Standing  - LD,Blood; Standing  - Uric acid; Standing  - IgG; Standing    2. Hepatosplenomegaly      3. Paroxysmal A-fib (HCC)      4. Autoimmune hemolytic anemia (HCC)      5. Chronic combined systolic and diastolic congestive heart failure (HCC)      6. Pulmonary hypertension (HCC)      7. Pacemaker      8. Hyperbilirubinemia    9. Arthritis      10. Vitamin B 12 deficiency        Patient has standing orders for blood work.  He has instructions about venetoclax.  He receives Gazyva and no change there.  Follow-up in 2 months.      I went over all the details with the patient about the treatment program.     .  Patient will continue to follow with  primary physician and other consultants.  Patient  voiced understanding and agrees      Disclaimer: This document was prepared using a dictation device.  If a word or phrase is confusing, or does not make sense, this is likely due to recognition error which was not discovered  during the providers review. If you believe an error has occurred, please Contact me through [employer] [facility] Line service for servando?cation.    Counseling / Coordination of Care   .  Provided counseling and support

## 2024-11-21 ENCOUNTER — HOSPITAL ENCOUNTER (OUTPATIENT)
Dept: INFUSION CENTER | Facility: CLINIC | Age: 74
Discharge: HOME | End: 2024-11-21
Payer: MEDICARE

## 2024-11-21 VITALS — BODY MASS INDEX: 29.66 KG/M2 | HEIGHT: 68 IN

## 2024-11-21 DIAGNOSIS — C91.10 CLL (CHRONIC LYMPHOCYTIC LEUKEMIA) (HCC): Primary | ICD-10-CM

## 2024-11-21 PROCEDURE — 96415 CHEMO IV INFUSION ADDL HR: CPT

## 2024-11-21 PROCEDURE — 96413 CHEMO IV INFUSION 1 HR: CPT

## 2024-11-21 PROCEDURE — 96367 TX/PROPH/DG ADDL SEQ IV INF: CPT

## 2024-11-21 RX ORDER — SODIUM CHLORIDE 9 MG/ML
20 INJECTION, SOLUTION INTRAVENOUS ONCE
Status: COMPLETED | OUTPATIENT
Start: 2024-11-21 | End: 2024-11-21

## 2024-11-21 RX ORDER — PREDNISONE 20 MG/1
40 TABLET ORAL DAILY
COMMUNITY

## 2024-11-21 RX ORDER — ACETAMINOPHEN 325 MG/1
650 TABLET ORAL ONCE
Status: COMPLETED | OUTPATIENT
Start: 2024-11-21 | End: 2024-11-21

## 2024-11-21 RX ADMIN — SODIUM CHLORIDE 20 ML/HR: 0.9 INJECTION, SOLUTION INTRAVENOUS at 09:39

## 2024-11-21 RX ADMIN — DIPHENHYDRAMINE HYDROCHLORIDE 25 MG: 50 INJECTION, SOLUTION INTRAMUSCULAR; INTRAVENOUS at 10:03

## 2024-11-21 RX ADMIN — OBINUTUZUMAB 1000 MG: 1000 INJECTION, SOLUTION, CONCENTRATE INTRAVENOUS at 10:31

## 2024-11-21 RX ADMIN — ACETAMINOPHEN 650 MG: 325 TABLET ORAL at 09:41

## 2024-11-21 RX ADMIN — DEXAMETHASONE SODIUM PHOSPHATE 20 MG: 10 INJECTION, SOLUTION INTRAMUSCULAR; INTRAVENOUS at 09:42

## 2024-11-21 NOTE — PROGRESS NOTES
Pt arrived to unit without complaint, tolerated treatment without incident. Pt declined AVS, aware of future appts, next one scheduled 12/19 0930

## 2024-11-21 NOTE — PLAN OF CARE
Problem: Potential for Falls  Goal: Patient will remain free of falls  Description: INTERVENTIONS:  - Assess patient frequently for physical needs  -  Identify cognitive and physical deficits and behaviors that affect risk of falls.  -  [place] fall precautions as indicated by assessment.  - Educate patient/family on patient safety including physical limitations  - Instruct patient to call for assistance with activity based on assessment  - Modify environment to reduce risk of injury  - Consider OT/PT consult to assist with strengthening/mobility  Outcome: Progressing

## 2024-11-24 ENCOUNTER — NURSE TRIAGE (OUTPATIENT)
Dept: OTHER | Facility: OTHER | Age: 74
End: 2024-11-24

## 2024-11-24 NOTE — TELEPHONE ENCOUNTER
"Regarding: Congestion/ Fatigue  ----- Message from Karuna ANDERSON sent at 11/24/2024  8:00 AM EST -----  Pt stated, \" I think I have an infection, I am congested and I am very tired.    "

## 2024-11-24 NOTE — TELEPHONE ENCOUNTER
"Reason for Disposition   Lots of coughing    Answer Assessment - Initial Assessment Questions  1. LOCATION: \"Where does it hurt?\"       Denies    2. ONSET: \"When did the sinus pain start?\"  (e.g., hours, days)       Yesterday    3. SEVERITY: \"How bad is the pain?\"   (Scale 1-10; mild, moderate or severe)      Denies    4. RECURRENT SYMPTOM: \"Have you ever had sinus problems before?\" If Yes, ask: \"When was the last time?\" and \"What happened that time?\"       Denies    5. NASAL CONGESTION: \"Is the nose blocked?\" If Yes, ask: \"Can you open it or must you breathe through your mouth?\"      Denies    6. NASAL DISCHARGE: \"Do you have discharge from your nose?\" If so ask, \"What color?\"      Denies    7. FEVER: \"Do you have a fever?\" If Yes, ask: \"What is it, how was it measured, and when did it start?\"       Denies    8. OTHER SYMPTOMS: \"Do you have any other symptoms?\" (e.g., sore throat, cough, earache, difficulty breathing)  Covid - test yesterday      Hoarseness of voice  Cough - productive; yellow-green      O2 95-96%  Higher than usually BP - SBP in 140's    Protocols used: Sinus Pain or Congestion-Adult-      Appointment booked for Monday AM viral v bacterial infection d/t patient's significant PMH.  Reviewed home care for interim and reasons to call back.  Patient verbalized understanding.  "

## 2024-11-25 ENCOUNTER — TELEPHONE (OUTPATIENT)
Age: 74
End: 2024-11-25

## 2024-11-25 ENCOUNTER — APPOINTMENT (OUTPATIENT)
Dept: RADIOLOGY | Facility: MEDICAL CENTER | Age: 74
End: 2024-11-25
Payer: MEDICARE

## 2024-11-25 ENCOUNTER — APPOINTMENT (OUTPATIENT)
Dept: LAB | Facility: MEDICAL CENTER | Age: 74
End: 2024-11-25
Payer: MEDICARE

## 2024-11-25 ENCOUNTER — OFFICE VISIT (OUTPATIENT)
Dept: FAMILY MEDICINE CLINIC | Facility: CLINIC | Age: 74
End: 2024-11-25
Payer: MEDICARE

## 2024-11-25 VITALS
WEIGHT: 196.4 LBS | DIASTOLIC BLOOD PRESSURE: 70 MMHG | SYSTOLIC BLOOD PRESSURE: 130 MMHG | OXYGEN SATURATION: 97 % | BODY MASS INDEX: 29.77 KG/M2 | RESPIRATION RATE: 14 BRPM | HEIGHT: 68 IN | HEART RATE: 86 BPM

## 2024-11-25 DIAGNOSIS — J06.9 VIRAL UPPER RESPIRATORY TRACT INFECTION: ICD-10-CM

## 2024-11-25 DIAGNOSIS — C91.10 CLL (CHRONIC LYMPHOCYTIC LEUKEMIA) (HCC): ICD-10-CM

## 2024-11-25 DIAGNOSIS — I50.42 CHRONIC COMBINED SYSTOLIC AND DIASTOLIC CONGESTIVE HEART FAILURE (HCC): ICD-10-CM

## 2024-11-25 DIAGNOSIS — K21.9 GASTROESOPHAGEAL REFLUX DISEASE WITHOUT ESOPHAGITIS: ICD-10-CM

## 2024-11-25 DIAGNOSIS — C91.10 CLL (CHRONIC LYMPHOCYTIC LEUKEMIA) (HCC): Chronic | ICD-10-CM

## 2024-11-25 DIAGNOSIS — R05.8 OTHER COUGH: Primary | ICD-10-CM

## 2024-11-25 DIAGNOSIS — R05.8 OTHER COUGH: ICD-10-CM

## 2024-11-25 DIAGNOSIS — E80.6 HYPERBILIRUBINEMIA: ICD-10-CM

## 2024-11-25 DIAGNOSIS — E87.1 HYPONATREMIA: ICD-10-CM

## 2024-11-25 DIAGNOSIS — I10 ESSENTIAL HYPERTENSION: Chronic | ICD-10-CM

## 2024-11-25 LAB
ALBUMIN SERPL BCG-MCNC: 4.5 G/DL (ref 3.5–5)
ALP SERPL-CCNC: 96 U/L (ref 34–104)
ALT SERPL W P-5'-P-CCNC: 22 U/L (ref 7–52)
ANION GAP SERPL CALCULATED.3IONS-SCNC: 10 MMOL/L (ref 4–13)
AST SERPL W P-5'-P-CCNC: 19 U/L (ref 13–39)
BASOPHILS # BLD AUTO: 0.01 THOUSANDS/ΜL (ref 0–0.1)
BASOPHILS NFR BLD AUTO: 0 % (ref 0–1)
BILIRUB SERPL-MCNC: 1.73 MG/DL (ref 0.2–1)
BUN SERPL-MCNC: 10 MG/DL (ref 5–25)
CALCIUM SERPL-MCNC: 9.5 MG/DL (ref 8.4–10.2)
CHLORIDE SERPL-SCNC: 97 MMOL/L (ref 96–108)
CO2 SERPL-SCNC: 30 MMOL/L (ref 21–32)
CREAT SERPL-MCNC: 0.73 MG/DL (ref 0.6–1.3)
EOSINOPHIL # BLD AUTO: 0.01 THOUSAND/ΜL (ref 0–0.61)
EOSINOPHIL NFR BLD AUTO: 0 % (ref 0–6)
ERYTHROCYTE [DISTWIDTH] IN BLOOD BY AUTOMATED COUNT: 17.3 % (ref 11.6–15.1)
GFR SERPL CREATININE-BSD FRML MDRD: 92 ML/MIN/1.73SQ M
GLUCOSE P FAST SERPL-MCNC: 112 MG/DL (ref 65–99)
HCT VFR BLD AUTO: 32.2 % (ref 36.5–49.3)
HGB BLD-MCNC: 11 G/DL (ref 12–17)
IGG SERPL-MCNC: 843 MG/DL (ref 635–1741)
IMM GRANULOCYTES # BLD AUTO: 0.06 THOUSAND/UL (ref 0–0.2)
IMM GRANULOCYTES NFR BLD AUTO: 1 % (ref 0–2)
LDH SERPL-CCNC: 229 U/L (ref 140–271)
LYMPHOCYTES # BLD AUTO: 0.8 THOUSANDS/ΜL (ref 0.6–4.47)
LYMPHOCYTES NFR BLD AUTO: 7 % (ref 14–44)
MCH RBC QN AUTO: 34.2 PG (ref 26.8–34.3)
MCHC RBC AUTO-ENTMCNC: 34.2 G/DL (ref 31.4–37.4)
MCV RBC AUTO: 100 FL (ref 82–98)
MONOCYTES # BLD AUTO: 1.2 THOUSAND/ΜL (ref 0.17–1.22)
MONOCYTES NFR BLD AUTO: 11 % (ref 4–12)
NEUTROPHILS # BLD AUTO: 8.83 THOUSANDS/ΜL (ref 1.85–7.62)
NEUTS SEG NFR BLD AUTO: 81 % (ref 43–75)
NRBC BLD AUTO-RTO: 0 /100 WBCS
PLATELET # BLD AUTO: 280 THOUSANDS/UL (ref 149–390)
PMV BLD AUTO: 9.5 FL (ref 8.9–12.7)
POTASSIUM SERPL-SCNC: 4.5 MMOL/L (ref 3.5–5.3)
PROT SERPL-MCNC: 7.4 G/DL (ref 6.4–8.4)
RBC # BLD AUTO: 3.22 MILLION/UL (ref 3.88–5.62)
SARS-COV-2 AG UPPER RESP QL IA: NEGATIVE
SL AMB POCT RAPID FLU A: NORMAL
SL AMB POCT RAPID FLU B: NORMAL
SODIUM SERPL-SCNC: 137 MMOL/L (ref 135–147)
URATE SERPL-MCNC: 4.8 MG/DL (ref 3.5–8.5)
VALID CONTROL: NORMAL
WBC # BLD AUTO: 10.91 THOUSAND/UL (ref 4.31–10.16)

## 2024-11-25 PROCEDURE — 84550 ASSAY OF BLOOD/URIC ACID: CPT

## 2024-11-25 PROCEDURE — 83615 LACTATE (LD) (LDH) ENZYME: CPT

## 2024-11-25 PROCEDURE — 87804 INFLUENZA ASSAY W/OPTIC: CPT | Performed by: FAMILY MEDICINE

## 2024-11-25 PROCEDURE — 82784 ASSAY IGA/IGD/IGG/IGM EACH: CPT

## 2024-11-25 PROCEDURE — G2211 COMPLEX E/M VISIT ADD ON: HCPCS | Performed by: FAMILY MEDICINE

## 2024-11-25 PROCEDURE — 85025 COMPLETE CBC W/AUTO DIFF WBC: CPT

## 2024-11-25 PROCEDURE — 36415 COLL VENOUS BLD VENIPUNCTURE: CPT

## 2024-11-25 PROCEDURE — 71046 X-RAY EXAM CHEST 2 VIEWS: CPT

## 2024-11-25 PROCEDURE — 87811 SARS-COV-2 COVID19 W/OPTIC: CPT | Performed by: FAMILY MEDICINE

## 2024-11-25 PROCEDURE — 99214 OFFICE O/P EST MOD 30 MIN: CPT | Performed by: FAMILY MEDICINE

## 2024-11-25 PROCEDURE — 80053 COMPREHEN METABOLIC PANEL: CPT

## 2024-11-25 NOTE — ASSESSMENT & PLAN NOTE
Wt Readings from Last 3 Encounters:   11/25/24 89.1 kg (196 lb 6.4 oz)   11/19/24 88.5 kg (195 lb)   11/12/24 88.6 kg (195 lb 6.4 oz)     He looks to be quite stable from a congestive heart failure perspective.  He is not taking furosemide daily at this point.  He was annoyed by chronic polyuria.  He is taking it just as needed.

## 2024-11-25 NOTE — TELEPHONE ENCOUNTER
Patient is calling in to see if he's able to take his venetoclax around lunch time instead of in the morning. Patient is requesting this due to having more food in his stomach and hoping that it would irritate his stomach less if he takes it around 12 noon daily.  Pt would like a call back, thank you.

## 2024-11-25 NOTE — ASSESSMENT & PLAN NOTE
Continue close follow-up with hematology.  He did have multiple questions about his current treatment regimen which I we will have to defer to hematology.  He is comfortable reaching out to them.

## 2024-11-25 NOTE — PROGRESS NOTES
Name: Murphy Willams      : 1950      MRN: [id number]  Encounter Provider: Pravin Ortega Jr, MD  Encounter Date: 2024   [facility]  :  Assessment & Plan  Viral upper respiratory tract infection  Fortunately, he is negative for COVID or flu today.  X-ray looks quite well.  Lungs are clear on exam.  He is very concerned about potential pneumonia etc. so I am going to send him for chest x-ray.  Orders:    XR chest pa and lateral; Future    Other cough    Orders:    POCT Rapid Covid Ag    POCT rapid flu A and B    XR chest pa and lateral; Future    CLL (chronic lymphocytic leukemia) (HCC)  Continue close follow-up with hematology.  He did have multiple questions about his current treatment regimen which I we will have to defer to hematology.  He is comfortable reaching out to them.       Chronic combined systolic and diastolic congestive heart failure (HCC)  Wt Readings from Last 3 Encounters:   24 89.1 kg (196 lb 6.4 oz)   24 88.5 kg (195 lb)   24 88.6 kg (195 lb 6.4 oz)     He looks to be quite stable from a congestive heart failure perspective.  He is not taking furosemide daily at this point.  He was annoyed by chronic polyuria.  He is taking it just as needed.               Essential hypertension  Well-controlled at this time.       Gastroesophageal reflux disease without esophagitis  Reflux stable       Hyponatremia  He has labs already ordered.       Hyperbilirubinemia  Check labs as ordered.              History of Present Illness     HPI patient presents today concern regarding a cough that he said for the last 7 or 8 days.  He notes he is bringing up yellow phlegm intermittently.  He has some mild shortness of breath especially when coughing and does note some chest tightness.  He has some increased fatigue.  He denies orthopnea and has noted no lower extremity swelling.  Review of Systems   Constitutional:  Positive for  "fatigue. Negative for appetite change, chills, fever and unexpected weight change.   HENT:  Negative for trouble swallowing.    Eyes:  Negative for visual disturbance.   Respiratory:  Positive for cough, chest tightness (When he coughs) and shortness of breath. Negative for wheezing.    Cardiovascular:  Negative for chest pain, palpitations and leg swelling.   Gastrointestinal:  Negative for abdominal distention, abdominal pain, blood in stool, constipation and diarrhea.   Endocrine: Negative for polyuria.   Genitourinary:  Negative for difficulty urinating and flank pain.   Musculoskeletal:  Negative for arthralgias and myalgias.   Skin:  Negative for rash.   Neurological:  Negative for dizziness and light-headedness.   Hematological:  Negative for adenopathy. Does not bruise/bleed easily.   Psychiatric/Behavioral:  Negative for dysphoric mood and sleep disturbance. The patient is not nervous/anxious.           Objective   /70 (BP Location: Left arm, Patient Position: Sitting, Cuff Size: Standard)   Pulse 86   Resp 14   Ht 5' 7.99\" (1.727 m)   Wt 89.1 kg (196 lb 6.4 oz)   SpO2 97%   BMI 29.87 kg/m²      Physical Exam  Constitutional:       General: He is not in acute distress.     Appearance: Normal appearance. He is well-developed. He is not diaphoretic.   HENT:      Head: Normocephalic.      Right Ear: External ear normal.      Left Ear: External ear normal.      Nose: Nose normal.   Eyes:      General:         Right eye: No discharge.         Left eye: No discharge.      Conjunctiva/sclera: Conjunctivae normal.      Pupils: Pupils are equal, round, and reactive to light.   Neck:      Thyroid: No thyromegaly.      Trachea: No tracheal deviation.   Cardiovascular:      Rate and Rhythm: Normal rate and regular rhythm.      Heart sounds: Normal heart sounds. No murmur heard.     No friction rub.   Pulmonary:      Effort: Pulmonary effort is normal. No respiratory distress.      Breath sounds: Normal " breath sounds. No wheezing.   Chest:      Chest wall: No tenderness.   Abdominal:      General: There is no distension.      Palpations: There is no mass.      Tenderness: There is no abdominal tenderness. There is no guarding or rebound.      Hernia: No hernia is present.   Musculoskeletal:         General: No swelling or deformity.      Cervical back: Normal range of motion.      Right lower leg: No edema.      Left lower leg: No edema.   Skin:     Findings: No erythema or rash.   Neurological:      General: No focal deficit present.      Mental Status: He is alert.      Cranial Nerves: No cranial nerve deficit.      Coordination: Coordination normal.   Psychiatric:         Thought Content: Thought content normal.

## 2024-11-25 NOTE — TELEPHONE ENCOUNTER
Spoke with patient. All questions answered. He also mentioned he has some congestion and cough but saw the PCP today and was told he has a cold. He had a CXR and labs which are pending. States the PCP listened and his lungs sounded clear and he was negative for COVID and Flu. Denies fever. States he bumped up his venetoclax this week up to 200mg on Wednesday. States if Dr. Hanley wants to see his labs or CXR he can review them in Epic when they are resulted. States he will call us if symptoms worsen or fever occurs.

## 2024-11-29 ENCOUNTER — TELEPHONE (OUTPATIENT)
Age: 74
End: 2024-11-29

## 2024-11-29 NOTE — TELEPHONE ENCOUNTER
Patient contacted the office this morning states that he was seen for an OV 11/25/24. Has been getting into coughing spells still, wet/dry cough, brings up yellow and clear mucus at times. Drinking hot tea with honey, using ricolas, using neti pot, tessalon perles. Wants to know if pcp feels it would be okay to try Mucinex DM OTC. Also, is asking if he should be calling Dr. Hanley's office to provide update as he was unsure.    Please contact patient back with an update today. Thank you.

## 2024-12-02 ENCOUNTER — TELEPHONE (OUTPATIENT)
Dept: FAMILY MEDICINE CLINIC | Facility: CLINIC | Age: 74
End: 2024-12-02

## 2024-12-02 ENCOUNTER — NURSE TRIAGE (OUTPATIENT)
Age: 74
End: 2024-12-02

## 2024-12-02 DIAGNOSIS — R05.2 SUBACUTE COUGH: Primary | ICD-10-CM

## 2024-12-02 RX ORDER — BENZONATATE 200 MG/1
200 CAPSULE ORAL 3 TIMES DAILY PRN
Qty: 30 CAPSULE | Refills: 1 | Status: SHIPPED | OUTPATIENT
Start: 2024-12-02

## 2024-12-02 NOTE — ASSESSMENT & PLAN NOTE
She does not need to keep apt on 12/5   Sodium fell to 125 yesterday, improved to 132 today with salt tabs  Appears acute on chronic  Continue to encourage oral intake  Monitor daily labs

## 2024-12-02 NOTE — TELEPHONE ENCOUNTER
Reason for call:   [x] Refill   [] Prior Auth  [x] Other:Not on active med list but cough is no better pt is requesting this to be sent in      Office:   [x] PCP/Provider -   [] Specialty/Provider -     Medication:   benzonatate (TESSALON) 200 MG 1 capsule 3 times daily     Pharmacy: [facility]     Does the patient have enough for 3 days?   [] Yes   [x] No - Send as HP to POD

## 2024-12-02 NOTE — TELEPHONE ENCOUNTER
"Patient was seen in the office last week for a cough. Patient states the cough is not any better. Is having frequent coughing spells/spasms. Is coughing up yellow mucous.  Advised patient to be seen in the office morlaes but he does not want to see anyone but Dr. Castaneda and his first available is on Wednesday. Patient asked to send this message to Dr. Castaneda and if he feels he needs to have something done before his Wednesday appt, please call him. Patient states he will not go to the ED unless he can't breathe. Please follow up with patient. OV scheduled for Wednesday.               Reason for Disposition   Continuous (nonstop) coughing interferes with work or school and no improvement using cough treatment per Care Advice    Answer Assessment - Initial Assessment Questions  1. ONSET: \"When did the cough begin?\"       Saw the Dr last week for URI  2. SEVERITY: \"How bad is the cough today?\"       States his cough is still severe  3. SPUTUM: \"Describe the color of your sputum\" (e.g., none, dry cough; clear, white, yellow, green)      Clear, sometimes yellow  4. HEMOPTYSIS: \"Are you coughing up any blood?\" If Yes, ask: \"How much?\" (e.g., flecks, streaks, tablespoons, etc.)      no  5. DIFFICULTY BREATHING: \"Are you having difficulty breathing?\" If Yes, ask: \"How bad is it?\" (e.g., mild, moderate, severe)       mild  6. FEVER: \"Do you have a fever?\" If Yes, ask: \"What is your temperature, how was it measured, and when did it start?\"      no  9. PE RISK FACTORS: \"Do you have a history of blood clots?\" (or: recent major surgery, recent prolonged travel, bedridden)      no  10. OTHER SYMPTOMS: \"Do you have any other symptoms?\" (e.g., runny nose, wheezing, chest pain)        no    Protocols used: Cough-Adult-[place]    "

## 2024-12-03 NOTE — TELEPHONE ENCOUNTER
Provider:  Dr. Hanley    Patient calling in reporting that he has an upper respiratory infection and follows up with his PCP tomorrow, to further assess.  He is inquiring if he is ok to continue taking the venetoclax, with having an infection or does it need to be held. He is not currently on an antibiotic, but if he does happen to go on one, would he need to hold the venetoclax then. I confirmed with patient we would discuss with the provider and return his phone call. He verbalized understanding and has no additional questions or concerns at this moment.     Patient is taking Mucinex and tessalon pearles. Denies fevers and SOB.

## 2024-12-03 NOTE — TELEPHONE ENCOUNTER
Called patient to make him aware. He verbalized understanding and will continue to take the venetoclax, he has no other questions or concerns at this moment.

## 2024-12-04 ENCOUNTER — OFFICE VISIT (OUTPATIENT)
Dept: FAMILY MEDICINE CLINIC | Facility: CLINIC | Age: 74
End: 2024-12-04
Payer: MEDICARE

## 2024-12-04 VITALS
HEART RATE: 96 BPM | DIASTOLIC BLOOD PRESSURE: 80 MMHG | BODY MASS INDEX: 29.16 KG/M2 | OXYGEN SATURATION: 98 % | HEIGHT: 68 IN | SYSTOLIC BLOOD PRESSURE: 138 MMHG | WEIGHT: 192.4 LBS | RESPIRATION RATE: 12 BRPM | TEMPERATURE: 98.7 F

## 2024-12-04 DIAGNOSIS — C91.10 CLL (CHRONIC LYMPHOCYTIC LEUKEMIA) (HCC): Chronic | ICD-10-CM

## 2024-12-04 DIAGNOSIS — I10 ESSENTIAL HYPERTENSION: Chronic | ICD-10-CM

## 2024-12-04 DIAGNOSIS — R10.31 RLQ ABDOMINAL PAIN: ICD-10-CM

## 2024-12-04 DIAGNOSIS — E87.5 HYPERKALEMIA: ICD-10-CM

## 2024-12-04 DIAGNOSIS — I50.42 CHRONIC COMBINED SYSTOLIC AND DIASTOLIC CONGESTIVE HEART FAILURE (HCC): ICD-10-CM

## 2024-12-04 DIAGNOSIS — R91.8 PULMONARY INFILTRATE IN RIGHT LUNG ON CHEST X-RAY: ICD-10-CM

## 2024-12-04 DIAGNOSIS — I27.20 PULMONARY HYPERTENSION (HCC): Chronic | ICD-10-CM

## 2024-12-04 DIAGNOSIS — R05.3 CHRONIC COUGH: Primary | ICD-10-CM

## 2024-12-04 PROCEDURE — 99214 OFFICE O/P EST MOD 30 MIN: CPT | Performed by: FAMILY MEDICINE

## 2024-12-04 PROCEDURE — G2211 COMPLEX E/M VISIT ADD ON: HCPCS | Performed by: FAMILY MEDICINE

## 2024-12-04 RX ORDER — FLUTICASONE PROPIONATE AND SALMETEROL 500; 50 UG/1; UG/1
1 POWDER RESPIRATORY (INHALATION) 2 TIMES DAILY
Qty: 60 BLISTER | Refills: 1 | Status: SHIPPED | OUTPATIENT
Start: 2024-12-04

## 2024-12-04 RX ORDER — FUROSEMIDE 20 MG/1
20 TABLET ORAL DAILY PRN
Qty: 90 TABLET | Refills: 5 | Status: SHIPPED | OUTPATIENT
Start: 2024-12-04

## 2024-12-04 RX ORDER — HYDROCHLOROTHIAZIDE 12.5 MG/1
12.5 TABLET ORAL DAILY
Qty: 100 TABLET | Refills: 3 | Status: SHIPPED | OUTPATIENT
Start: 2024-12-04

## 2024-12-04 NOTE — PROGRESS NOTES
Name: Murphy Willams      : 1950      MRN: [id number]  Encounter Provider: Pravin Ortega Jr, MD  Encounter Date: 2024   Encounter department: [facility] PRIMARY CARE  :  Assessment & Plan  Chronic combined systolic and diastolic congestive heart failure (HCC)  Wt Readings from Last 3 Encounters:   24 87.3 kg (192 lb 6.4 oz)   24 89.1 kg (196 lb 6.4 oz)   24 88.5 kg (195 lb)     The patient has known significant congestive heart failure.  Fortunately, he does not appear to fluid overloaded at this time.  He is having some persistent coughing and chest discomfort that probably is related to his right middle lobe process.  He has a CT coming up next week as well as pulmonary follow-up.          Orders:    hydroCHLOROthiazide 12.5 mg tablet; Take 1 tablet (12.5 mg total) by mouth daily    CT chest wo contrast; Future    Pulmonary hypertension (HCC)  He is chronically short of breath.  Continue pulmonary follow-up.       Chronic cough  He is having persistent cough with some ongoing right middle lobe infiltrate.  He did have a bronchoscopy as per pulmonary and will be seeing them next week.  I am going to try to move up his chest CT as I will be getting an abdominal CT today for his right lower quadrant pain  Orders:    Fluticasone-Salmeterol (Advair Diskus) 500-50 mcg/dose inhaler; Inhale 1 puff 2 (two) times a day Rinse mouth after use.    CT chest wo contrast; Future    Essential hypertension  Add hydrochlorothiazide 12.5 mg.  We had stopped his furosemide due to stability of his CHF and concerns regarding excessive need for self-catheterization on furosemide.  Blood pressure has been creeping up  Orders:    hydroCHLOROthiazide 12.5 mg tablet; Take 1 tablet (12.5 mg total) by mouth daily    Hyperkalemia  Monitor labs.  He has routine labs ordered through hematology which I will review.  Orders:    furosemide (LASIX) 20 mg tablet; Take 1 tablet (20 mg total) by mouth  daily as needed (Increased lower extremity swelling)    RLQ abdominal pain  He had some right lower quadrant pain this morning that was quite transient and resolved after a bowel movement.  Exam is benign at this point.  He does still have his appendix.  He is encouraged to reach out to me immediately with any worsening pain.  CBC will be unreliable in light of his CLL and he would need a CT with contrast to further assess this pain if it becomes more persistent.    As an addendum to this note, the patient reached out to today's-12/5/2024 and is having worsening pain.  Will check CT of his abdomen stat.  I will also try to move up his chest CT    Orders:    CT abdomen pelvis w contrast; Future           History of Present Illness     HPI patient presents today concern regarding persistent coughing and some chest tightness with taking a deep breath.  He was treated with Augmentin for a right middle lobe pneumonia confirmed on chest x-ray on August 20.  Since that time, he has been noting some persistent coughing, some intermittent fatigue as well as shortness of breath.  He was hospitalized in September for CLL with severe anemia requiring blood transfusion.  He was initiated on venetoclax for his CLL and again admitted on November 6.  Several imaging studies have showed stability of his right middle lobe lesion.  He is scheduled for CT next week as well as pulmonary follow-up.  He also notes that he is having some right lower quadrant pain over the past 24 hours.  He has had some mild nausea but denies fever or chills.    Review of Systems   Constitutional:  Positive for fatigue. Negative for appetite change, chills, fever and unexpected weight change.   HENT:  Negative for trouble swallowing.    Eyes:  Negative for visual disturbance.   Respiratory:  Positive for cough, chest tightness and shortness of breath. Negative for choking and wheezing.    Cardiovascular:  Negative for chest pain, palpitations and leg  "swelling.   Gastrointestinal:  Positive for abdominal pain. Negative for abdominal distention, blood in stool, constipation and diarrhea.   Endocrine: Negative for polyuria.   Genitourinary:  Negative for difficulty urinating and flank pain.   Musculoskeletal:  Positive for arthralgias. Negative for myalgias.   Skin:  Negative for rash.   Neurological:  Negative for dizziness and light-headedness.   Hematological:  Negative for adenopathy. Does not bruise/bleed easily.   Psychiatric/Behavioral:  Negative for dysphoric mood and sleep disturbance. The patient is not nervous/anxious.           Objective   /80 (BP Location: Left arm, Patient Position: Sitting, Cuff Size: Standard)   Pulse 96   Temp 98.7 °F (37.1 °C)   Resp 12   Ht 5' 7.99\" (1.727 m)   Wt 87.3 kg (192 lb 6.4 oz)   SpO2 98%   BMI 29.26 kg/m²      Physical Exam  Constitutional:       General: He is not in acute distress.     Appearance: Normal appearance. He is well-developed. He is ill-appearing. He is not diaphoretic.   HENT:      Head: Normocephalic.      Right Ear: External ear normal.      Left Ear: External ear normal.      Nose: Nose normal.   Eyes:      General:         Right eye: No discharge.         Left eye: No discharge.      Conjunctiva/sclera: Conjunctivae normal.      Pupils: Pupils are equal, round, and reactive to light.   Neck:      Thyroid: No thyromegaly.      Trachea: No tracheal deviation.   Cardiovascular:      Rate and Rhythm: Normal rate and regular rhythm.      Heart sounds: Normal heart sounds. No murmur heard.     No friction rub.   Pulmonary:      Effort: Pulmonary effort is normal. No respiratory distress.      Breath sounds: Normal breath sounds. No wheezing.   Chest:      Chest wall: No tenderness.   Abdominal:      General: There is no distension.      Palpations: There is no mass.      Tenderness: There is no abdominal tenderness. There is no guarding or rebound.      Hernia: No hernia is present. "   Musculoskeletal:         General: No swelling or deformity.      Cervical back: Normal range of motion.      Right lower leg: No edema.      Left lower leg: No edema.   Lymphadenopathy:      Cervical: No cervical adenopathy.   Skin:     Findings: No erythema or rash.   Neurological:      General: No focal deficit present.      Mental Status: He is alert.      Cranial Nerves: No cranial nerve deficit.      Coordination: Coordination normal.   Psychiatric:         Thought Content: Thought content normal.

## 2024-12-04 NOTE — ASSESSMENT & PLAN NOTE
Wt Readings from Last 3 Encounters:   12/04/24 87.3 kg (192 lb 6.4 oz)   11/25/24 89.1 kg (196 lb 6.4 oz)   11/19/24 88.5 kg (195 lb)     The patient has known significant congestive heart failure.  Fortunately, he does not appear to fluid overloaded at this time.  He is having some persistent coughing and chest discomfort that probably is related to his right middle lobe process.  He has a CT coming up next week as well as pulmonary follow-up.          Orders:    hydroCHLOROthiazide 12.5 mg tablet; Take 1 tablet (12.5 mg total) by mouth daily    CT chest wo contrast; Future

## 2024-12-04 NOTE — ASSESSMENT & PLAN NOTE
Monitor labs.  He has routine labs ordered through hematology which I will review.  Orders:    furosemide (LASIX) 20 mg tablet; Take 1 tablet (20 mg total) by mouth daily as needed (Increased lower extremity swelling)

## 2024-12-04 NOTE — ASSESSMENT & PLAN NOTE
He had some right lower quadrant pain this morning that was quite transient and resolved after a bowel movement.  Exam is benign at this point.  He does still have his appendix.  He is encouraged to reach out to me immediately with any worsening pain.  CBC will be unreliable in light of his CLL and he would need a CT with contrast to further assess this pain if it becomes more persistent.    As an addendum to this note, the patient reached out to today's-12/5/2024 and is having worsening pain.  Will check CT of his abdomen stat.  I will also try to move up his chest CT    Orders:    CT abdomen pelvis w contrast; Future

## 2024-12-04 NOTE — ASSESSMENT & PLAN NOTE
He is having persistent cough with some ongoing right middle lobe infiltrate.  He did have a bronchoscopy as per pulmonary and will be seeing them next week.  I am going to try to move up his chest CT as I will be getting an abdominal CT today for his right lower quadrant pain  Orders:    Fluticasone-Salmeterol (Advair Diskus) 500-50 mcg/dose inhaler; Inhale 1 puff 2 (two) times a day Rinse mouth after use.    CT chest wo contrast; Future

## 2024-12-04 NOTE — TELEPHONE ENCOUNTER
Benzonatate was sent yesterday.  If he is not improving, he will need to see one of our clinicians.  I am sorry, but I cannot guarantee that he will see me every time he would like to especially as my schedule has gotten very overbooked.  He will need to become more comfortable seeing other clinicians within the practice.

## 2024-12-04 NOTE — ASSESSMENT & PLAN NOTE
Add hydrochlorothiazide 12.5 mg.  We had stopped his furosemide due to stability of his CHF and concerns regarding excessive need for self-catheterization on furosemide.  Blood pressure has been creeping up  Orders:    hydroCHLOROthiazide 12.5 mg tablet; Take 1 tablet (12.5 mg total) by mouth daily

## 2024-12-05 ENCOUNTER — TELEPHONE (OUTPATIENT)
Dept: FAMILY MEDICINE CLINIC | Facility: CLINIC | Age: 74
End: 2024-12-05

## 2024-12-05 ENCOUNTER — TELEPHONE (OUTPATIENT)
Age: 74
End: 2024-12-05

## 2024-12-05 ENCOUNTER — TELEPHONE (OUTPATIENT)
Dept: INFUSION CENTER | Facility: CLINIC | Age: 74
End: 2024-12-05

## 2024-12-05 ENCOUNTER — HOSPITAL ENCOUNTER (OUTPATIENT)
Dept: CT IMAGING | Facility: HOSPITAL | Age: 74
Discharge: HOME | End: 2024-12-05
Payer: MEDICARE

## 2024-12-05 ENCOUNTER — RESULTS FOLLOW-UP (OUTPATIENT)
Dept: FAMILY MEDICINE CLINIC | Facility: CLINIC | Age: 74
End: 2024-12-05

## 2024-12-05 DIAGNOSIS — R91.8 PULMONARY INFILTRATE IN RIGHT LUNG ON CHEST X-RAY: ICD-10-CM

## 2024-12-05 DIAGNOSIS — R05.3 CHRONIC COUGH: ICD-10-CM

## 2024-12-05 DIAGNOSIS — R10.31 RLQ ABDOMINAL PAIN: ICD-10-CM

## 2024-12-05 DIAGNOSIS — C91.10 CLL (CHRONIC LYMPHOCYTIC LEUKEMIA) (HCC): Chronic | ICD-10-CM

## 2024-12-05 DIAGNOSIS — I50.42 CHRONIC COMBINED SYSTOLIC AND DIASTOLIC CONGESTIVE HEART FAILURE (HCC): ICD-10-CM

## 2024-12-05 PROCEDURE — 74177 CT ABD & PELVIS W/CONTRAST: CPT

## 2024-12-05 PROCEDURE — 71260 CT THORAX DX C+: CPT

## 2024-12-05 RX ADMIN — IOHEXOL 100 ML: 350 INJECTION, SOLUTION INTRAVENOUS at 15:50

## 2024-12-05 NOTE — TELEPHONE ENCOUNTER
Received voicemail from patient requesting to change appt on 1/16 to 1/15 due to a conflicting appt. Attempted to call back number provided but got busy signal x3 attempts. Appt changed to 1/15 at 9:30am.

## 2024-12-05 NOTE — TELEPHONE ENCOUNTER
Patient was seen for an OV yesterday, was advised to contact the office back if pain would worsen. States it was a sharp stabbing pain last night and knows something isn't right. Was advised to reach out to office this morning if worsening and pcp would order  CT with contrast to further assess this pain if it becomes more persistent. Asking if this can be ordered, would like to go to [address] for imaging. If this would be ordered stat asking if office can schedule and let him know. Please contact back with an update [phone], thank you.

## 2024-12-06 ENCOUNTER — TELEPHONE (OUTPATIENT)
Age: 74
End: 2024-12-06

## 2024-12-06 LAB
MYCOBACTERIUM TUBERCULOSIS COMPLEX PCR: NORMAL
SCAN RESULT: NORMAL

## 2024-12-09 ENCOUNTER — APPOINTMENT (OUTPATIENT)
Dept: LAB | Age: 74
End: 2024-12-09
Payer: MEDICARE

## 2024-12-09 DIAGNOSIS — C91.10 CLL (CHRONIC LYMPHOCYTIC LEUKEMIA) (HCC): ICD-10-CM

## 2024-12-09 LAB
ALBUMIN SERPL BCG-MCNC: 4.6 G/DL (ref 3.5–5)
ALP SERPL-CCNC: 101 U/L (ref 34–104)
ALT SERPL W P-5'-P-CCNC: 14 U/L (ref 7–52)
ANION GAP SERPL CALCULATED.3IONS-SCNC: 10 MMOL/L (ref 4–13)
AST SERPL W P-5'-P-CCNC: 17 U/L (ref 13–39)
BASOPHILS # BLD AUTO: 0.01 THOUSANDS/ÂΜL (ref 0–0.1)
BASOPHILS NFR BLD AUTO: 0 % (ref 0–1)
BILIRUB SERPL-MCNC: 1.09 MG/DL (ref 0.2–1)
BUN SERPL-MCNC: 10 MG/DL (ref 5–25)
CALCIUM SERPL-MCNC: 9.2 MG/DL (ref 8.4–10.2)
CHLORIDE SERPL-SCNC: 94 MMOL/L (ref 96–108)
CO2 SERPL-SCNC: 29 MMOL/L (ref 21–32)
CREAT SERPL-MCNC: 0.67 MG/DL (ref 0.6–1.3)
EOSINOPHIL # BLD AUTO: 0.01 THOUSAND/ÂΜL (ref 0–0.61)
EOSINOPHIL NFR BLD AUTO: 0 % (ref 0–6)
ERYTHROCYTE [DISTWIDTH] IN BLOOD BY AUTOMATED COUNT: 16.3 % (ref 11.6–15.1)
GFR SERPL CREATININE-BSD FRML MDRD: 95 ML/MIN/1.73SQ M
GLUCOSE P FAST SERPL-MCNC: 111 MG/DL (ref 65–99)
HCT VFR BLD AUTO: 32.2 % (ref 36.5–49.3)
HGB BLD-MCNC: 11.2 G/DL (ref 12–17)
IMM GRANULOCYTES # BLD AUTO: 0.03 THOUSAND/UL (ref 0–0.2)
IMM GRANULOCYTES NFR BLD AUTO: 1 % (ref 0–2)
LDH SERPL-CCNC: 193 U/L (ref 140–271)
LYMPHOCYTES # BLD AUTO: 0.76 THOUSANDS/ÂΜL (ref 0.6–4.47)
LYMPHOCYTES NFR BLD AUTO: 13 % (ref 14–44)
MCH RBC QN AUTO: 32.8 PG (ref 26.8–34.3)
MCHC RBC AUTO-ENTMCNC: 34.8 G/DL (ref 31.4–37.4)
MCV RBC AUTO: 94 FL (ref 82–98)
MONOCYTES # BLD AUTO: 0.86 THOUSAND/ÂΜL (ref 0.17–1.22)
MONOCYTES NFR BLD AUTO: 14 % (ref 4–12)
NEUTROPHILS # BLD AUTO: 4.39 THOUSANDS/ÂΜL (ref 1.85–7.62)
NEUTS SEG NFR BLD AUTO: 72 % (ref 43–75)
NRBC BLD AUTO-RTO: 0 /100 WBCS
PHOSPHATE SERPL-MCNC: 3.6 MG/DL (ref 2.3–4.1)
PLATELET # BLD AUTO: 261 THOUSANDS/UL (ref 149–390)
PMV BLD AUTO: 9.1 FL (ref 8.9–12.7)
POTASSIUM SERPL-SCNC: 4.2 MMOL/L (ref 3.5–5.3)
PROT SERPL-MCNC: 7 G/DL (ref 6.4–8.4)
RBC # BLD AUTO: 3.41 MILLION/UL (ref 3.88–5.62)
SODIUM SERPL-SCNC: 133 MMOL/L (ref 135–147)
URATE SERPL-MCNC: 5.1 MG/DL (ref 3.5–8.5)
WBC # BLD AUTO: 6.06 THOUSAND/UL (ref 4.31–10.16)

## 2024-12-09 PROCEDURE — 36415 COLL VENOUS BLD VENIPUNCTURE: CPT

## 2024-12-09 PROCEDURE — 84100 ASSAY OF PHOSPHORUS: CPT

## 2024-12-09 PROCEDURE — 80053 COMPREHEN METABOLIC PANEL: CPT

## 2024-12-09 PROCEDURE — 85025 COMPLETE CBC W/AUTO DIFF WBC: CPT

## 2024-12-09 PROCEDURE — 84550 ASSAY OF BLOOD/URIC ACID: CPT

## 2024-12-09 PROCEDURE — 83615 LACTATE (LD) (LDH) ENZYME: CPT

## 2024-12-13 NOTE — TELEPHONE ENCOUNTER
Patient called in and was very nice and pleasant and wanted to follow up on his message. Please advise

## 2024-12-15 RX ORDER — ACETAMINOPHEN 325 MG/1
650 TABLET ORAL ONCE
Status: CANCELLED | OUTPATIENT
Start: 2024-12-19

## 2024-12-15 RX ORDER — SODIUM CHLORIDE 9 MG/ML
20 INJECTION, SOLUTION INTRAVENOUS ONCE
Status: CANCELLED | OUTPATIENT
Start: 2024-12-19

## 2024-12-17 ENCOUNTER — OFFICE VISIT (OUTPATIENT)
Dept: PULMONOLOGY | Facility: CLINIC | Age: 74
End: 2024-12-17
Payer: MEDICARE

## 2024-12-17 VITALS
SYSTOLIC BLOOD PRESSURE: 128 MMHG | DIASTOLIC BLOOD PRESSURE: 72 MMHG | WEIGHT: 194 LBS | HEIGHT: 68 IN | BODY MASS INDEX: 29.4 KG/M2 | OXYGEN SATURATION: 99 % | TEMPERATURE: 98.5 F | HEART RATE: 94 BPM

## 2024-12-17 DIAGNOSIS — C91.10 CLL (CHRONIC LYMPHOCYTIC LEUKEMIA) (HCC): Chronic | ICD-10-CM

## 2024-12-17 DIAGNOSIS — J30.1 ALLERGIC RHINITIS DUE TO POLLEN, UNSPECIFIED SEASONALITY: Chronic | ICD-10-CM

## 2024-12-17 DIAGNOSIS — R05.3 CHRONIC COUGH: Primary | ICD-10-CM

## 2024-12-17 DIAGNOSIS — I27.20 PULMONARY HYPERTENSION (HCC): Chronic | ICD-10-CM

## 2024-12-17 DIAGNOSIS — R93.89 ABNORMAL CT OF THE CHEST: ICD-10-CM

## 2024-12-17 PROCEDURE — 99214 OFFICE O/P EST MOD 30 MIN: CPT | Performed by: INTERNAL MEDICINE

## 2024-12-17 PROCEDURE — G2211 COMPLEX E/M VISIT ADD ON: HCPCS | Performed by: INTERNAL MEDICINE

## 2024-12-17 RX ORDER — IPRATROPIUM BROMIDE 21 UG/1
2 SPRAY, METERED NASAL 2 TIMES DAILY
Qty: 30 ML | Refills: 3 | Status: SHIPPED | OUTPATIENT
Start: 2024-12-17

## 2024-12-17 NOTE — ASSESSMENT & PLAN NOTE
Patient has tried high-dose Advair 500/50 without benefit  Tessalon Perles of limited benefit.  Over-the-counter cough medicines not helpful  Current radiographic finding not substantial enough to be responsible for chronic cough  Combination of his medications for his CLL have up to 35% incidence of chronic cough - I suspect that his persistent symptomatology may be related to the combination of agents for his CLL.  Advised to discuss further with Dr. Hanley as is unclear if he has some alternate treatment options.  Given excellent control of his CLL with the current regimen, consideration could be given to continuing the regimen and tolerating/accepting the cough

## 2024-12-17 NOTE — ASSESSMENT & PLAN NOTE
Echo does continue to show significant elevation in pulmonary pressures  No leg swelling or signs or symptoms of decompensated right heart failure  He is maintained on diuretics by cardiology

## 2024-12-17 NOTE — PROGRESS NOTES
Progress note - Pulmonary Medicine   Murphy Willams 74 y.o. male MRN: [id number]       Impression & Plan:     Chronic cough  Patient has tried high-dose Advair 500/50 without benefit  Tessalon Perles of limited benefit.  Over-the-counter cough medicines not helpful  Current radiographic finding not substantial enough to be responsible for chronic cough  Combination of his medications for his CLL have up to 35% incidence of chronic cough - I suspect that his persistent symptomatology may be related to the combination of agents for his CLL.  Advised to discuss further with Dr. Hanley as is unclear if he has some alternate treatment options.  Given excellent control of his CLL with the current regimen, consideration could be given to continuing the regimen and tolerating/accepting the cough    Pulmonary hypertension (HCC)  Echo does continue to show significant elevation in pulmonary pressures  No leg swelling or signs or symptoms of decompensated right heart failure  He is maintained on diuretics by cardiology    Allergic rhinitis due to pollen  Continues on ipratropium nasal spray, advised to increase to 3 times daily  Using montelukast but has not noticed much benefit  Also taking over-the-counter loratadine    Abnormal CT of the chest  Prior pneumonia has resolved only some mild linear scarring remains.  Small collection of fluid in the fissure consistent with pseudotumor.  Cause is unclear.  No need for follow-up imaging for pneumonia.  Continue to monitor symptoms.  Consider repeat imaging in 6 to 12 months if persistent symptomatology    Return in about 4 months (around 4/17/2025).    ______________________________________________________________________    HPI:    Murphy Willams presents today for follow-up of abnormal CAT scan of the chest with persistent pneumonia that was quite slow to resolve.  His most recent imaging however does show improvement but also a small pseudotumor that has developed in the  fissure.  He continues to have a cough.  His cough has been bothersome.  He feels like he may have strained some abdominal muscles from coughing.  He has not had much relief from the cough despite steroids, Tessalon, and high-dose Advair 500/50.  He has also developed hoarseness secondary to the inhaler despite rinsing regularly    He does not report any chest pain.  No lightheadedness or dizziness.  He has not had any leg swelling.  No abdominal bloating.  No GERD.  He tells me that he has been abstinent from alcohol and his liver numbers have been stable.    Weight and appetite remain unchanged.  No fever, chills, or other infectious symptoms.  CLL has been controlled on the current regimen and he follows closely with Dr. Hanley.  He otherwise is doing well    Current Medications:    Current Outpatient Medications:     acetaminophen (TYLENOL) 325 mg tablet, Take 2 tablets (650 mg total) by mouth every 6 (six) hours as needed for mild pain, Disp: , Rfl:     allopurinol (ZYLOPRIM) 300 mg tablet, Take 1 tablet (300 mg total) by mouth daily, Disp: 90 tablet, Rfl: 1    ascorbic acid (VITAMIN C) 250 MG CHEW, Chew 250 mg daily, Disp: , Rfl:     atorvastatin (LIPITOR) 20 mg tablet, Take 1 tablet (20 mg total) by mouth daily, Disp: 100 tablet, Rfl: 3    Blood Pressure Monitoring (Omron 5 Series BP Monitor) PEE, , Disp: , Rfl:     dabigatran etexilate (PRADAXA) 150 mg capsu, Take 1 capsule (150 mg total) by mouth 2 (two) times a day, Disp: 180 capsule, Rfl: 3    furosemide (LASIX) 20 mg tablet, Take 1 tablet (20 mg total) by mouth daily as needed (Increased lower extremity swelling), Disp: 90 tablet, Rfl: 5    hydroCHLOROthiazide 12.5 mg tablet, Take 1 tablet (12.5 mg total) by mouth daily, Disp: 100 tablet, Rfl: 3    ipratropium (ATROVENT) 0.03 % nasal spray, 2 sprays into each nostril 2 (two) times a day, Disp: 30 mL, Rfl: 3    Lactobacillus (PROBIOTIC ACIDOPHILUS PO), Take by mouth, Disp: , Rfl:     LORazepam (ATIVAN)  "0.5 mg tablet, TAKE ONE TABLET BY MOUTH DAILY AS NEEDED for anxiety, Disp: 60 tablet, Rfl: 1    montelukast (SINGULAIR) 10 mg tablet, Take 1 tablet (10 mg total) by mouth daily, Disp: 90 tablet, Rfl: 3    nadolol (CORGARD) 80 MG tablet, Take 1 tablet (80 mg total) by mouth daily in the early morning, Disp: 90 tablet, Rfl: 3    Omega-3 Fatty Acids (FISH OIL) 1,000 mg, Take by mouth daily, Disp: , Rfl:     omeprazole (PriLOSEC) 40 MG capsule, Take one capsule by mouth once every day., Disp: 100 capsule, Rfl: 3    predniSONE 20 mg tablet, Take 40 mg by mouth daily Day before each Gazyva treatment, Disp: , Rfl:     Venetoclax 10 & 50 & 100 MG TBPK, Will start in hospital  Take 20 mg by mouth daily for 7 days, then 50 mg daily for 7 days, then 100 mg daily for 7 days, then 200 mg daily thereafter, Disp: 42 each, Rfl: 0    Venetoclax 100 MG TABS, Take 2 tablets (200 mg total) by mouth daily, Disp: 60 tablet, Rfl: 5    zolpidem (AMBIEN) 5 mg tablet, Take 1/2-1 full tablet at bedtime as needed for sleep.  Do not take if drinking any alcohol or taking lorazepam, Disp: 90 tablet, Rfl: 0    vitamin B-12 (CYANOCOBALAMIN) 500 MCG TABS, Take 1 tablet (500 mcg total) by mouth daily, Disp: 30 tablet, Rfl: 0    Review of Systems:    Aside from what is mentioned in the HPI, the review of systems is otherwise negative    Past medical history, surgical history, and family history were reviewed and updated as appropriate    Social history updates:  Social History     Tobacco Use   Smoking Status Former    Current packs/day: 0.00    Types: Cigarettes    Quit date: 1980    Years since quittin.3    Passive exposure: Past   Smokeless Tobacco Never       PhysicalExamination:  Vitals:   /72 (BP Location: Left arm, Patient Position: Sitting, Cuff Size: Large)   Pulse 94   Temp 98.5 °F (36.9 °C) (Tympanic)   Ht 5' 7.99\" (1.727 m)   Wt 88 kg (194 lb)   SpO2 99%   BMI 29.51 kg/m²     Gen:  Comfortable on room air.  No " conversational dyspnea  HEENT:  Conjugate gaze.  sclerae anicteric.  Oropharynx moist  Neck: Trachea is midline. No JVD. No adenopathy  Chest: Excursion is symmetric.  There are no wheezes.  No crackles.  Cardiac: Regular. no murmur  Abdomen:  benign  Extremities: No edema  Neuro:  Normal speech and mentation    Diagnostic Data:  Labs:  I personally reviewed the most recent laboratory data pertinent to today's visit    Lab Results   Component Value Date    WBC 6.06 12/09/2024    HGB 11.2 (L) 12/09/2024    HCT 32.2 (L) 12/09/2024    MCV 94 12/09/2024     12/09/2024     Lab Results   Component Value Date    SODIUM 133 (L) 12/09/2024    K 4.2 12/09/2024    CO2 29 12/09/2024    CL 94 (L) 12/09/2024    BUN 10 12/09/2024    CREATININE 0.67 12/09/2024    GLUCOSE 130 01/10/2021    CALCIUM 9.2 12/09/2024       Imaging:  I personally reviewed the images on the PAC system pertinent to today's visit  CT scan of the chest was viewed on the PACS system.  Study was done sooner than anticipated on 12/5.  This included the abdomen and pelvis.  No intra-abdominal pathology was noted.  Lungs show near complete resolution of the infiltrate and has developed a  small pseudotumor in the fissure of questionable clinical significance        Darrell Torrez MD

## 2024-12-17 NOTE — ASSESSMENT & PLAN NOTE
Continues on ipratropium nasal spray, advised to increase to 3 times daily  Using montelukast but has not noticed much benefit  Also taking over-the-counter loratadine

## 2024-12-17 NOTE — Clinical Note
Murphy's persistent cough I suspect may be related to the combination of chemo agents for CLL. In combination seems cough prevalence is about 35%.   I advised him to discuss whether other treatment options may be available vs tolerating the symptoms long term given his excellent response to the treatment regimen from the CLL perspective. He intends to d/w Dr Hanley at next followup in February Chris

## 2024-12-17 NOTE — ASSESSMENT & PLAN NOTE
Prior pneumonia has resolved only some mild linear scarring remains.  Small collection of fluid in the fissure consistent with pseudotumor.  Cause is unclear.  No need for follow-up imaging for pneumonia.  Continue to monitor symptoms.  Consider repeat imaging in 6 to 12 months if persistent symptomatology

## 2024-12-19 ENCOUNTER — HOSPITAL ENCOUNTER (OUTPATIENT)
Dept: INFUSION CENTER | Facility: CLINIC | Age: 74
Discharge: HOME | End: 2024-12-19
Payer: MEDICARE

## 2024-12-19 ENCOUNTER — TELEPHONE (OUTPATIENT)
Age: 74
End: 2024-12-19

## 2024-12-19 ENCOUNTER — REMOTE DEVICE CLINIC VISIT (OUTPATIENT)
Dept: CARDIOLOGY CLINIC | Facility: CLINIC | Age: 74
End: 2024-12-19
Payer: MEDICARE

## 2024-12-19 VITALS
WEIGHT: 195.33 LBS | TEMPERATURE: 98.4 F | HEIGHT: 68 IN | BODY MASS INDEX: 29.6 KG/M2 | DIASTOLIC BLOOD PRESSURE: 85 MMHG | SYSTOLIC BLOOD PRESSURE: 143 MMHG | HEART RATE: 106 BPM | RESPIRATION RATE: 16 BRPM

## 2024-12-19 DIAGNOSIS — I47.20 VENTRICULAR TACHYCARDIA (HCC): Primary | ICD-10-CM

## 2024-12-19 DIAGNOSIS — C91.10 CLL (CHRONIC LYMPHOCYTIC LEUKEMIA) (HCC): Primary | ICD-10-CM

## 2024-12-19 DIAGNOSIS — I46.9 CARDIAC ARREST (HCC): ICD-10-CM

## 2024-12-19 LAB
ALBUMIN SERPL BCG-MCNC: 4.7 G/DL (ref 3.5–5)
ALP SERPL-CCNC: 95 U/L (ref 34–104)
ALT SERPL W P-5'-P-CCNC: 23 U/L (ref 7–52)
ANION GAP SERPL CALCULATED.3IONS-SCNC: 8 MMOL/L (ref 4–13)
AST SERPL W P-5'-P-CCNC: 19 U/L (ref 13–39)
BASOPHILS # BLD AUTO: 0 THOUSANDS/ÂΜL (ref 0–0.1)
BASOPHILS NFR BLD AUTO: 0 % (ref 0–1)
BILIRUB SERPL-MCNC: 1.24 MG/DL (ref 0.2–1)
BUN SERPL-MCNC: 11 MG/DL (ref 5–25)
CALCIUM SERPL-MCNC: 9.5 MG/DL (ref 8.4–10.2)
CHLORIDE SERPL-SCNC: 96 MMOL/L (ref 96–108)
CO2 SERPL-SCNC: 29 MMOL/L (ref 21–32)
CREAT SERPL-MCNC: 0.64 MG/DL (ref 0.6–1.3)
EOSINOPHIL # BLD AUTO: 0 THOUSAND/ÂΜL (ref 0–0.61)
EOSINOPHIL NFR BLD AUTO: 0 % (ref 0–6)
ERYTHROCYTE [DISTWIDTH] IN BLOOD BY AUTOMATED COUNT: 16.7 % (ref 11.6–15.1)
GFR SERPL CREATININE-BSD FRML MDRD: 97 ML/MIN/1.73SQ M
GLUCOSE SERPL-MCNC: 164 MG/DL (ref 65–140)
HCT VFR BLD AUTO: 32.4 % (ref 36.5–49.3)
HGB BLD-MCNC: 11.4 G/DL (ref 12–17)
IMM GRANULOCYTES # BLD AUTO: 0.05 THOUSAND/UL (ref 0–0.2)
IMM GRANULOCYTES NFR BLD AUTO: 1 % (ref 0–2)
LDH SERPL-CCNC: 165 U/L (ref 140–271)
LYMPHOCYTES # BLD AUTO: 0.45 THOUSANDS/ÂΜL (ref 0.6–4.47)
LYMPHOCYTES NFR BLD AUTO: 8 % (ref 14–44)
MCH RBC QN AUTO: 32.7 PG (ref 26.8–34.3)
MCHC RBC AUTO-ENTMCNC: 35.2 G/DL (ref 31.4–37.4)
MCV RBC AUTO: 93 FL (ref 82–98)
MONOCYTES # BLD AUTO: 0.49 THOUSAND/ÂΜL (ref 0.17–1.22)
MONOCYTES NFR BLD AUTO: 9 % (ref 4–12)
NEUTROPHILS # BLD AUTO: 4.61 THOUSANDS/ÂΜL (ref 1.85–7.62)
NEUTS SEG NFR BLD AUTO: 82 % (ref 43–75)
NRBC BLD AUTO-RTO: 0 /100 WBCS
PLATELET # BLD AUTO: 226 THOUSANDS/UL (ref 149–390)
PMV BLD AUTO: 8.8 FL (ref 8.9–12.7)
POTASSIUM SERPL-SCNC: 4.2 MMOL/L (ref 3.5–5.3)
PROT SERPL-MCNC: 7.5 G/DL (ref 6.4–8.4)
RBC # BLD AUTO: 3.49 MILLION/UL (ref 3.88–5.62)
SODIUM SERPL-SCNC: 133 MMOL/L (ref 135–147)
URATE SERPL-MCNC: 4.4 MG/DL (ref 3.5–8.5)
WBC # BLD AUTO: 5.6 THOUSAND/UL (ref 4.31–10.16)

## 2024-12-19 PROCEDURE — 93295 DEV INTERROG REMOTE 1/2/MLT: CPT | Performed by: INTERNAL MEDICINE

## 2024-12-19 PROCEDURE — 85025 COMPLETE CBC W/AUTO DIFF WBC: CPT | Performed by: INTERNAL MEDICINE

## 2024-12-19 PROCEDURE — 93296 REM INTERROG EVL PM/IDS: CPT | Performed by: INTERNAL MEDICINE

## 2024-12-19 PROCEDURE — 96413 CHEMO IV INFUSION 1 HR: CPT

## 2024-12-19 PROCEDURE — 96415 CHEMO IV INFUSION ADDL HR: CPT

## 2024-12-19 PROCEDURE — 80053 COMPREHEN METABOLIC PANEL: CPT | Performed by: INTERNAL MEDICINE

## 2024-12-19 PROCEDURE — 83615 LACTATE (LD) (LDH) ENZYME: CPT | Performed by: INTERNAL MEDICINE

## 2024-12-19 PROCEDURE — 84550 ASSAY OF BLOOD/URIC ACID: CPT | Performed by: INTERNAL MEDICINE

## 2024-12-19 PROCEDURE — 96367 TX/PROPH/DG ADDL SEQ IV INF: CPT

## 2024-12-19 RX ORDER — ACETAMINOPHEN 325 MG/1
650 TABLET ORAL ONCE
Status: COMPLETED | OUTPATIENT
Start: 2024-12-19 | End: 2024-12-19

## 2024-12-19 RX ORDER — SODIUM CHLORIDE 9 MG/ML
20 INJECTION, SOLUTION INTRAVENOUS ONCE
Status: COMPLETED | OUTPATIENT
Start: 2024-12-19 | End: 2024-12-19

## 2024-12-19 RX ADMIN — ACETAMINOPHEN 650 MG: 325 TABLET ORAL at 10:07

## 2024-12-19 RX ADMIN — DEXAMETHASONE SODIUM PHOSPHATE 20 MG: 10 INJECTION, SOLUTION INTRAMUSCULAR; INTRAVENOUS at 10:07

## 2024-12-19 RX ADMIN — OBINUTUZUMAB 1000 MG: 1000 INJECTION, SOLUTION, CONCENTRATE INTRAVENOUS at 11:35

## 2024-12-19 RX ADMIN — DIPHENHYDRAMINE HYDROCHLORIDE 25 MG: 50 INJECTION, SOLUTION INTRAMUSCULAR; INTRAVENOUS at 10:29

## 2024-12-19 RX ADMIN — SODIUM CHLORIDE 20 ML/HR: 0.9 INJECTION, SOLUTION INTRAVENOUS at 10:04

## 2024-12-19 NOTE — TELEPHONE ENCOUNTER
Patient of Rebecca BECKHAM, last seen 4/24/24    Patient states that [facility] informed him that they sent us a fax on 12/17/24    Patient will receive an emergency supply but he is asking if we can complete what they need asap    Call placed to [facility] medical, They need a valid medicare OV notes for his catheter supply,     Medicare wont accept ranges so please addend OV notes from 4/24/24 to state CIC 5 not 4-5 with dx of chronic urinary retention. Patient request it states 5    Addended note needs be by Rebecca BECKHAM,and addended on the OV Note from 4/24/24 and dated     Once completed please fax the updated OV notes to [phone]    Patient request a call back tomorrow once this is completed  #[phone]

## 2024-12-19 NOTE — TELEPHONE ENCOUNTER
Patient called in expressing concern and frustration that he had sent a message via the portal and has not yet heard back from PCP.     Patient had additional concerns regarding imaging results. He stated that he has since seen Pulmonary and got his questions answered but stated he wanted to call in and make us aware that his PCP provider did not respond to him in timely manner.    Patient was pleasant and genuinely concerned that his questions were not being answered.     Provided emotional support.   No further questions or concerns at this time

## 2024-12-19 NOTE — PROGRESS NOTES
Results for orders placed or performed in visit on 12/19/24   Cardiac EP device report    Narrative    MDT BI-V ICD (DDIR) ACTIVE SYSTEM IS MRI CONDITIONAL  CARELINK TRANSMISSION: BATTERY VOLTAGE ADEQUATE (5.8 YRS). AP: 95.6%. : 99.9% + VSRP: <0.1%. ALL AVAILABLE LEAD PARAMETERS WITHIN NORMAL LIMITS. NO SIGNIFICANT HIGH RATE EPISODES. 35 V-SENSE EPISODES W/ AVAIL MARKERS SHOWING 16 BEATS @ 115 BPM & FUSION. PT TAKES PRADAXA, CORGARD. EF: 55% (ECHO 9/3/24). OPTI-VOL WITHIN NORMAL LIMITS. NORMAL DEVICE FUNCTION. CH

## 2024-12-19 NOTE — PROGRESS NOTES
Pt offers no new complaints today, had labs 12/9, per dr. Chaudhry, labs need to be redrawn today, pt given calendar with when to get his labs every 2 weeks, tolerated gazyva without complications, confirmed appt back on 1-15-25 0930, appt calendar provided to pt.

## 2024-12-20 ENCOUNTER — RESULTS FOLLOW-UP (OUTPATIENT)
Dept: CARDIOLOGY CLINIC | Facility: CLINIC | Age: 74
End: 2024-12-20

## 2024-12-20 NOTE — TELEPHONE ENCOUNTER
Mily from [facility] calling in regards to script for catheters.     [facility] stating they are unable to accept note that was sent over as they need face to face OV note with correct CIC and requirements for Medicare. Please review.     Fax #- [phone]

## 2024-12-20 NOTE — TELEPHONE ENCOUNTER
Please notify the patient I have made adjustments so that he can receive supplies to CIC 5 x daily. We will fax the new script to [facility] asap.

## 2024-12-20 NOTE — TELEPHONE ENCOUNTER
[facility] requires an office note stating the patient CIC 5 times a day and permanent retention, sign and dated. She states what was faxed was a conversation and medicare will not accept that. Please advise and fax updated note to [phone].

## 2024-12-20 NOTE — TELEPHONE ENCOUNTER
Patient was personally evaluated by me on 4/24/24 for known neurogenic bladder and urinary retention.    He is currently using CIC five (5) times daily, 150/month, 360/90 day supply. He uses 16F straight tip. Please fax a new scrip to [facility] ASAP. Thank you!      ** previous office note cannot be addended as it was form last spring **

## 2024-12-20 NOTE — TELEPHONE ENCOUNTER
Patient calling and very upset, he just got off the phone with [facility]. Patient is crying and expresses that he is stressed out over this. Encouraged patient that he does not  have to call [facility] we will take care of this for him.    He spoke with a Rep named Lore, she told patient that he needed a new office visit.    This Nurse called [facility] and spoke with Katina, she states that Lore is new. She informed that the fax was not appropriate and needed the letter printed and hand signed and date by Rebecca    Informed her that Rebecca will be in on Monday an take care of this    Returned call to patient to inform that he does not need an OV and that Rebecca will print out the note, sign, date and fax on Monday, verbalized understanding

## 2024-12-20 NOTE — TELEPHONE ENCOUNTER
Faxed addended notes above to [facility]    Call placed to patient , no answer, left a detailed message that notes were update and faxed to [facility] for him.

## 2024-12-23 ENCOUNTER — TELEPHONE (OUTPATIENT)
Age: 74
End: 2024-12-23

## 2024-12-23 NOTE — TELEPHONE ENCOUNTER
Patient called in stating [facility] called him stating he needs to be seen in the office as it has been too long since his last office appointment. Patient requesting to speak with Jaimie. Warm transfer to Jaimie.

## 2024-12-23 NOTE — TELEPHONE ENCOUNTER
As per previous telephone note -- Patient was personally evaluated by me on 4/24/24 for known neurogenic bladder and urinary retention.     He is currently using CIC five (5) times daily, 150/month, 360/90 day supply. He uses 16F straight tip. Please fax a new scrip to [facility] ASAP. Thank you!       ** previous office note cannot be addended as it was form last spring **

## 2024-12-23 NOTE — TELEPHONE ENCOUNTER
Phone call to patient.  States he did not want to go back to taking Lasix.  He self caths and it becomes too much to constantly self cath.  He states his bp range 125-140's/76-80's.  He has become sedentary since having cancer treatments.  He used to be able to work out and bp's were more stable  Discussed with Dr. Butt in Dr. Bueno absence.  He agrees bp would have been better when he was able to work out.  Bp now are not too concernin.  He should continue to monitor,  if start to elevate closer to 150's consistently he should contact the office.      Discussed with patient.  He is in agreement with Dr. Butt instructions.

## 2024-12-23 NOTE — TELEPHONE ENCOUNTER
Repeat note placed to [facility] today 12/23 with correct catheter orders. Isabelle GARCIA notified to repeat fax ASAP, no need for repeat visit at this time.

## 2024-12-23 NOTE — TELEPHONE ENCOUNTER
Patient calling regarding his BP being elevated lately, ranging around 140s systolic and 80s diastolic. He wants to know if Dr. Bueno thinks it would be a good idea to start taking Lasix again. Please advise.

## 2024-12-23 NOTE — TELEPHONE ENCOUNTER
"Patient calling and states that [facility] called him and he was told by them that he needs an updated OV. He is unsure whom he spoke with.    This Nurse placed a call to [facility], spoke with Halima. They received the addendum, the new attached note says at the top \"telephone encounter\" they wont accept this. It has to be a note that is addened in the 4/2024 note, if it can't be addend has to have an appointment with updated OV notes. If this is not completed patient will get a bill for his supplies.     This Rep from [facility] states that there are many changes with Medicare within the last few months, requested they send us information so we can all become more acclimated with what they need/want so we can avoid our patients becoming so unnecessarily frustrated     Scheduled patient 12/26/24, confirmed date, time and location       [phone]   "

## 2024-12-26 ENCOUNTER — OFFICE VISIT (OUTPATIENT)
Dept: UROLOGY | Facility: MEDICAL CENTER | Age: 74
End: 2024-12-26
Payer: MEDICARE

## 2024-12-26 VITALS
DIASTOLIC BLOOD PRESSURE: 74 MMHG | WEIGHT: 194 LBS | OXYGEN SATURATION: 99 % | HEART RATE: 98 BPM | HEIGHT: 67 IN | SYSTOLIC BLOOD PRESSURE: 146 MMHG | BODY MASS INDEX: 30.45 KG/M2

## 2024-12-26 DIAGNOSIS — N40.1 BPH WITH OBSTRUCTION/LOWER URINARY TRACT SYMPTOMS: Chronic | ICD-10-CM

## 2024-12-26 DIAGNOSIS — Z12.5 SCREENING FOR PROSTATE CANCER: ICD-10-CM

## 2024-12-26 DIAGNOSIS — R33.9 URINARY RETENTION: Primary | ICD-10-CM

## 2024-12-26 DIAGNOSIS — N13.8 BPH WITH OBSTRUCTION/LOWER URINARY TRACT SYMPTOMS: Chronic | ICD-10-CM

## 2024-12-26 PROCEDURE — 99213 OFFICE O/P EST LOW 20 MIN: CPT

## 2024-12-26 NOTE — ASSESSMENT & PLAN NOTE
Status post TURP x 2 for treatment of BPH  Unfortunately, the patient's TURP procedures did not treat his chronic urinary retention and the patient requires to perform CIC 6 times daily for treatment of this.  Patient is able to perform CIC without complications and the catheters pass easily.

## 2024-12-26 NOTE — PROGRESS NOTES
12/26/2024      Assessment and Plan    74 y.o. male managed by Dr. Ren    Urinary retention  Patient performs CIC 6 times daily for treatment of his chronic urinary retention  CT chest abdomen and pelvis with contrast performed 12/5/2024 noted symmetric nephrographic phase enhancement of the kidneys with no obstructive uropathy and mild circumferential bladder wall thickening  Patients urine cultures are chronically colonized - no need to treat unless new or worsening symptoms from his baseline   Patient will continue to perform CIC 6 time a day for treatment of his chronic urinary retention    BPH with obstruction/lower urinary tract symptoms  Status post TURP x 2 for treatment of BPH  Unfortunately, the patient's TURP procedures did not treat his chronic urinary retention and the patient requires to perform CIC 6 times daily for treatment of this.  Patient is able to perform CIC without complications and the catheters pass easily.    Screening for prostate cancer  Patient's last PSA was performed 10/14/2024 and found to be 0.207.  Refer to PSA trend below.  We discussed the patient's PSA is overall low and stable near his baseline.  Patient can plan to repeat PSA in 1 year from his last with follow-up in the office at that time.    Lab Results   Component Value Date    PSA 0.207 10/14/2024    PSA 0.14 07/26/2023    PSA 0.1 04/18/2023            History of Present Illness  Murphy Willams is a 74 y.o. male here for evaluation of history of BPH status post TURP performed twice, neurogenic bladder, and chronic urinary retention requiring CIC 6 times daily.  Patient was last seen in the office 4/24/2024.  Patient has underwent 2 prior TURPs in the past for ongoing BPH leading to urinary retention.  Patient also underwent robotic diverticuli to me of the bladder on 8/3/2022 with the hopes of correcting his neurogenic bladder.  Unfortunately, the patient denied any changes in his voiding pattern postoperatively.   "Patient continues to perform CIC 6 times daily for treatment of his chronic urinary retention.  Patient underwent ultrasound of the kidney and bladder on 3/21/2024 which was unremarkable.  Due to the patient's frequent CIC, his urine cultures are chronically colonized.  It was discussed with Dr. Ren as well as the infectious disease team, that he should not be placed on daily suppressive antibiotics.  Patient's last PSA was performed 10/14/2024 and found to be 0.207.  Patient underwent CT chest abdomen pelvis with contrast on 12/5/2024 which noted symmetric nephrographic phase enhancement of the kidneys with no obstructive uropathy and mild circumferential bladder wall thickening sequelae of chronic distal outlet obstruction.  Today, the patient offers no new lower urinary tract complaints.  Patient is frustrated as he is unable to obtain his catheter supplies.  Patient states that he was diagnosed with CLL and follows with Dr. Hanley for treatment.        Review of Systems   Constitutional:  Negative for chills and fever.   HENT:  Negative for ear pain and sore throat.    Eyes:  Negative for pain and visual disturbance.   Respiratory:  Negative for cough and shortness of breath.    Cardiovascular:  Negative for chest pain and palpitations.   Gastrointestinal:  Negative for abdominal pain and vomiting.   Genitourinary:  Positive for difficulty urinating. Negative for decreased urine volume, dysuria, flank pain, frequency, hematuria, penile pain and urgency.   Musculoskeletal:  Negative for arthralgias and back pain.   Skin:  Negative for color change and rash.   Neurological:  Negative for seizures and syncope.   All other systems reviewed and are negative.               Vitals  Vitals:    12/26/24 0754   BP: 146/74   BP Location: Left arm   Patient Position: Sitting   Cuff Size: Standard   Pulse: 98   SpO2: 99%   Weight: 88 kg (194 lb)   Height: 5' 7\" (1.702 m)       Physical Exam  Vitals reviewed. "   Constitutional:       General: He is not in acute distress.     Appearance: Normal appearance. He is not ill-appearing.   HENT:      Head: Normocephalic and atraumatic.      Nose: Nose normal.   Eyes:      General: No scleral icterus.  Pulmonary:      Effort: No respiratory distress.   Abdominal:      General: Abdomen is flat. There is no distension.      Palpations: Abdomen is soft.      Tenderness: There is no abdominal tenderness.   Musculoskeletal:         General: Normal range of motion.      Cervical back: Normal range of motion.   Skin:     General: Skin is warm.      Coloration: Skin is not jaundiced.   Neurological:      Mental Status: He is alert and oriented to person, place, and time.      Gait: Gait normal.   Psychiatric:         Mood and Affect: Mood normal.         Behavior: Behavior normal.           Past History  Past Medical History:   Diagnosis Date    Alcoholic cirrhosis of liver without ascites (HCC) 10/29/2024    Anxiety     BPH (benign prostatic hypertrophy)     Cancer (HCC)     CLL    CLL (chronic lymphocytic leukemia) (HCC)     2009    Colon polyp     Concussion     Resolved: 08/22/16    COVID-19 12/29/2023    Depression     Diverticulitis 01/13/2020 2014 CT done 11/19 12/19 CT done     E coli bacteremia 06/27/2021 2/2 blood cultures with Ecoli  Source appears to be the urine     Epididymitis 05/19/2021 5/2021    Gastrointestinal hemorrhage     Last assessed: 08/27/13    GERD (gastroesophageal reflux disease)     H/O vitamin D deficiency 07/07/2021    Hearing loss of aging     Hiatal hernia     History of right hip replacement 04/19/2021    History of transfusion     Hyperlipidemia     Hypertension     Hyponatremia 03/06/2023    Iron deficiency anemia     Livedo reticularis 09/02/2024    Microscopic hematuria     Last assessed: 06/28/13    OA (osteoarthritis)     right hip    Obesity (BMI 30.0-34.9) 04/07/2022    Pneumothorax     Primary osteoarthritis of right hip 09/29/2017     "He is status post right hip arthroplasty    Prostatitis     Pulmonary hypertension ([facility])     QT prolongation     Seasonal allergies     Sepsis ([facility]) 2023    Unresponsive episode 2023    Urinary tract infection associated with catheterization of urinary tract  ([facility]) 2021    Pseudomonal UTI asymptomatic.  Per Urology do not treat at this time.    Urinary tract infection associated with catheterization of urinary tract  ([facility]) 2021    Pseudomonal UTI asymptomatic.  Per Urology do not treat at this time.  He did have urosepsis from E coli     UTI (urinary tract infection)     in past    Ventricular fibrillation ([facility]) 2023    Noted after unresponsive episode      Wears glasses      Social History     Socioeconomic History    Marital status: /Civil Union     Spouse name: None    Number of children: None    Years of education: None    Highest education level: None   Occupational History    None   Tobacco Use    Smoking status: Former     Current packs/day: 0.00     Types: Cigarettes     Quit date: 1980     Years since quittin.3     Passive exposure: Past    Smokeless tobacco: Never   Vaping Use    Vaping status: Never Used   Substance and Sexual Activity    Alcohol use: Yes     Alcohol/week: 2.0 - 4.0 standard drinks of alcohol     Types: 2 - 4 Cans of beer per week     Comment: rare    Drug use: Not Currently     Types: Marijuana     Comment: \"medical marijuana\"    Sexual activity: Not Currently   Other Topics Concern    None   Social History Narrative    None     Social Drivers of Health     Financial Resource Strain: Low Risk  (10/23/2023)    Overall Financial Resource Strain (CARDIA)     Difficulty of Paying Living Expenses: Not hard at all   Food Insecurity: No Food Insecurity (2024)    Hunger Vital Sign     Worried About Running Out of Food in the Last Year: Never true     Ran Out of Food in the Last Year: Never true   Transportation Needs: No Transportation Needs " (2024)    PRAPARE - Transportation     Lack of Transportation (Medical): No     Lack of Transportation (Non-Medical): No   Physical Activity: Not on file   Stress: Not on file   Social Connections: Not on file   Intimate Partner Violence: Not on file   Housing Stability: Low Risk  (2024)    Housing Stability Vital Sign     Unable to Pay for Housing in the Last Year: No     Number of Times Moved in the Last Year: 1     Homeless in the Last Year: No     Social History     Tobacco Use   Smoking Status Former    Current packs/day: 0.00    Types: Cigarettes    Quit date: 1980    Years since quittin.3    Passive exposure: Past   Smokeless Tobacco Never     Family History   Problem Relation Age of Onset    No Known Problems Mother     Heart attack Father     Diabetes Brother     Prostate cancer Brother        The following portions of the patient's history were reviewed and updated as appropriate: allergies, current medications, past medical history, past social history, past surgical history and problem list.    Results  No results found for this or any previous visit (from the past hour).]  Lab Results   Component Value Date    PSA 0.207 10/14/2024    PSA 0.14 2023    PSA 0.1 2023    PSA 0.4 2021     Lab Results   Component Value Date    GLUCOSE 130 01/10/2021    CALCIUM 9.5 2024     (L) 2015    K 4.2 2024    CO2 29 2024    CL 96 2024    BUN 11 2024    CREATININE 0.64 2024     Lab Results   Component Value Date    WBC 5.60 2024    HGB 11.4 (L) 2024    HCT 32.4 (L) 2024    MCV 93 2024     2024

## 2024-12-26 NOTE — ASSESSMENT & PLAN NOTE
Patient performs CIC 6 times daily for treatment of his chronic urinary retention  CT chest abdomen and pelvis with contrast performed 12/5/2024 noted symmetric nephrographic phase enhancement of the kidneys with no obstructive uropathy and mild circumferential bladder wall thickening  Patients urine cultures are chronically colonized - no need to treat unless new or worsening symptoms from his baseline   Patient will continue to perform CIC 6 time a day for treatment of his chronic urinary retention

## 2024-12-26 NOTE — ASSESSMENT & PLAN NOTE
Patient's last PSA was performed 10/14/2024 and found to be 0.207.  Refer to PSA trend below.  We discussed the patient's PSA is overall low and stable near his baseline.  Patient can plan to repeat PSA in 1 year from his last with follow-up in the office at that time.    Lab Results   Component Value Date    PSA 0.207 10/14/2024    PSA 0.14 07/26/2023    PSA 0.1 04/18/2023

## 2024-12-26 NOTE — TELEPHONE ENCOUNTER
Dieter Etienne, I just signed my note stating that Murphy performs CIC 6 times daily for treatment of his chronic urinary retention.

## 2024-12-27 ENCOUNTER — TELEPHONE (OUTPATIENT)
Age: 74
End: 2024-12-27

## 2024-12-27 NOTE — TELEPHONE ENCOUNTER
Patient called in requesting to speak with Jaimie, I was able to fax over the office note from yesterdays appointment and Rebecca's form from 12/23. Warm transfer to Jaimie.

## 2024-12-27 NOTE — TELEPHONE ENCOUNTER
"Provider: Dr. Hanley    Patient called in to provide update on symptoms.     In 11/25 telephone encounter, patient reported a cough and thought he might have a cold.     CXR was completed and came back normal, per patient. Patient wanted to report that he still has a cough that is not constant and occasionally brings up clear mucus.      He is inquiring if the cough could be because of the Venetoclax. Patient is also experiencing increased fatigue and dry mouth, though, he states he had dry mouth prior to starting Venetoclax. I made patient aware that we would inquire about the cough, but that fatigue is one of the side effects of the medication; I did confirm that dry mouth is not and provided some education surrounding how to help with dry mouth. I also instructed patient to take frequent rest periods, to assist with combating the fatigue. I inquired if patient is able to tolerate the symptoms he is experiencing and he reports he is, he just wants to make sure they are \"normal\".    He denies SOB, chest pain, bleeding, interruption of ADLs, nausea/vomiting, diarrhea/constipation.   Patient was previously taking mucinex and tessalon pearles, but has since stopped those things, reporting they weren't helping at all.    Patient has no additional questions or concerns at this moment.           "

## 2024-12-30 ENCOUNTER — APPOINTMENT (OUTPATIENT)
Dept: LAB | Facility: HOSPITAL | Age: 74
End: 2024-12-30
Payer: MEDICARE

## 2024-12-30 DIAGNOSIS — C91.10 CLL (CHRONIC LYMPHOCYTIC LEUKEMIA) (HCC): ICD-10-CM

## 2024-12-30 LAB
ALBUMIN SERPL BCG-MCNC: 4.5 G/DL (ref 3.5–5)
ALP SERPL-CCNC: 115 U/L (ref 34–104)
ALT SERPL W P-5'-P-CCNC: 33 U/L (ref 7–52)
ANION GAP SERPL CALCULATED.3IONS-SCNC: 7 MMOL/L (ref 4–13)
AST SERPL W P-5'-P-CCNC: 23 U/L (ref 13–39)
BASOPHILS # BLD MANUAL: 0 THOUSAND/UL (ref 0–0.1)
BASOPHILS NFR MAR MANUAL: 0 % (ref 0–1)
BILIRUB SERPL-MCNC: 1.5 MG/DL (ref 0.2–1)
BUN SERPL-MCNC: 14 MG/DL (ref 5–25)
CALCIUM SERPL-MCNC: 9.3 MG/DL (ref 8.4–10.2)
CHLORIDE SERPL-SCNC: 96 MMOL/L (ref 96–108)
CO2 SERPL-SCNC: 30 MMOL/L (ref 21–32)
CREAT SERPL-MCNC: 0.75 MG/DL (ref 0.6–1.3)
EOSINOPHIL # BLD MANUAL: 0 THOUSAND/UL (ref 0–0.4)
EOSINOPHIL NFR BLD MANUAL: 0 % (ref 0–6)
ERYTHROCYTE [DISTWIDTH] IN BLOOD BY AUTOMATED COUNT: 16.8 % (ref 11.6–15.1)
GFR SERPL CREATININE-BSD FRML MDRD: 91 ML/MIN/1.73SQ M
GLUCOSE SERPL-MCNC: 116 MG/DL (ref 65–140)
HCT VFR BLD AUTO: 34 % (ref 36.5–49.3)
HGB BLD-MCNC: 11.9 G/DL (ref 12–17)
LDH SERPL-CCNC: 251 U/L (ref 140–271)
LYMPHOCYTES # BLD AUTO: 0.52 THOUSAND/UL (ref 0.6–4.47)
LYMPHOCYTES # BLD AUTO: 8 % (ref 14–44)
MCH RBC QN AUTO: 32.4 PG (ref 26.8–34.3)
MCHC RBC AUTO-ENTMCNC: 35 G/DL (ref 31.4–37.4)
MCV RBC AUTO: 93 FL (ref 82–98)
MONOCYTES # BLD AUTO: 1.05 THOUSAND/UL (ref 0–1.22)
MONOCYTES NFR BLD: 16 % (ref 4–12)
NEUTROPHILS # BLD MANUAL: 4.98 THOUSAND/UL (ref 1.85–7.62)
NEUTS BAND NFR BLD MANUAL: 1 % (ref 0–8)
NEUTS SEG NFR BLD AUTO: 75 % (ref 43–75)
PHOSPHATE SERPL-MCNC: 3.2 MG/DL (ref 2.3–4.1)
PLATELET # BLD AUTO: 227 THOUSANDS/UL (ref 149–390)
PLATELET BLD QL SMEAR: ADEQUATE
PMV BLD AUTO: 8.7 FL (ref 8.9–12.7)
POTASSIUM SERPL-SCNC: 4.1 MMOL/L (ref 3.5–5.3)
PROT SERPL-MCNC: 7.5 G/DL (ref 6.4–8.4)
RBC # BLD AUTO: 3.67 MILLION/UL (ref 3.88–5.62)
RBC MORPH BLD: NORMAL
SODIUM SERPL-SCNC: 133 MMOL/L (ref 135–147)
URATE SERPL-MCNC: 5.3 MG/DL (ref 3.5–8.5)
WBC # BLD AUTO: 6.55 THOUSAND/UL (ref 4.31–10.16)

## 2024-12-30 PROCEDURE — 36415 COLL VENOUS BLD VENIPUNCTURE: CPT

## 2024-12-30 PROCEDURE — 83615 LACTATE (LD) (LDH) ENZYME: CPT

## 2024-12-30 PROCEDURE — 80053 COMPREHEN METABOLIC PANEL: CPT

## 2024-12-30 PROCEDURE — 85007 BL SMEAR W/DIFF WBC COUNT: CPT

## 2024-12-30 PROCEDURE — 85027 COMPLETE CBC AUTOMATED: CPT

## 2024-12-30 PROCEDURE — 84100 ASSAY OF PHOSPHORUS: CPT

## 2024-12-30 PROCEDURE — 84550 ASSAY OF BLOOD/URIC ACID: CPT

## 2024-12-30 NOTE — TELEPHONE ENCOUNTER
Returned phone call to patient to relay the message.     Patient reports he was told by his pulmonologist that his cough is likely from Venetoclax and is upset to hear from us that we state it should not be.    Murphy reports he hardly has the cough, but has no idea where it is from, since PCP and pulmonologist report he has no active infections.      Patient also reports that his fatigue is improving and is happy to see his lab work is improving as well.    He is going to get a second opinion from [facility], just to make sure he is on the right track for treatment and states he does not believe the communication between providers and with him and [facility] is very good.      He has no additional questions or concerns at this moment and thanked me for my time.

## 2025-01-06 ENCOUNTER — TELEPHONE (OUTPATIENT)
Age: 75
End: 2025-01-06

## 2025-01-06 NOTE — TELEPHONE ENCOUNTER
Patient called office requesting his medical records to be sent to [facility]. I informed patient we cannot release any information without his authorization and a signed consent form. I informed her I could help with sending the forms or he can request this through the medical records department.      Patient was very rude and very insulting. I provided the medical record phone number to see if they can help with processing his medical record release. (He feels that he shouldn't have to sign anything to get his records sent to another office.)      He also requested to cancel his up coming appointment in March with Dr. Peter García. I confirmed I would cancel appointment as requested by patient.

## 2025-01-11 RX ORDER — SODIUM CHLORIDE 9 MG/ML
20 INJECTION, SOLUTION INTRAVENOUS ONCE
Status: CANCELLED | OUTPATIENT
Start: 2025-01-15

## 2025-01-11 RX ORDER — ACETAMINOPHEN 325 MG/1
650 TABLET ORAL ONCE
Status: CANCELLED | OUTPATIENT
Start: 2025-01-15

## 2025-01-13 ENCOUNTER — APPOINTMENT (OUTPATIENT)
Dept: LAB | Facility: HOSPITAL | Age: 75
End: 2025-01-13
Payer: MEDICARE

## 2025-01-13 DIAGNOSIS — C91.10 CLL (CHRONIC LYMPHOCYTIC LEUKEMIA) (HCC): ICD-10-CM

## 2025-01-13 LAB
ALBUMIN SERPL BCG-MCNC: 4.6 G/DL (ref 3.5–5)
ALP SERPL-CCNC: 114 U/L (ref 34–104)
ALT SERPL W P-5'-P-CCNC: 24 U/L (ref 7–52)
ANION GAP SERPL CALCULATED.3IONS-SCNC: 6 MMOL/L (ref 4–13)
AST SERPL W P-5'-P-CCNC: 27 U/L (ref 13–39)
BASOPHILS # BLD AUTO: 0.02 THOUSANDS/ΜL (ref 0–0.1)
BASOPHILS NFR BLD AUTO: 0 % (ref 0–1)
BILIRUB SERPL-MCNC: 1.04 MG/DL (ref 0.2–1)
BUN SERPL-MCNC: 10 MG/DL (ref 5–25)
CALCIUM SERPL-MCNC: 9.4 MG/DL (ref 8.4–10.2)
CHLORIDE SERPL-SCNC: 97 MMOL/L (ref 96–108)
CO2 SERPL-SCNC: 31 MMOL/L (ref 21–32)
CREAT SERPL-MCNC: 0.72 MG/DL (ref 0.6–1.3)
EOSINOPHIL # BLD AUTO: 0 THOUSAND/ΜL (ref 0–0.61)
EOSINOPHIL NFR BLD AUTO: 0 % (ref 0–6)
ERYTHROCYTE [DISTWIDTH] IN BLOOD BY AUTOMATED COUNT: 17.7 % (ref 11.6–15.1)
GFR SERPL CREATININE-BSD FRML MDRD: 93 ML/MIN/1.73SQ M
GLUCOSE P FAST SERPL-MCNC: 114 MG/DL (ref 65–99)
HCT VFR BLD AUTO: 35.7 % (ref 36.5–49.3)
HGB BLD-MCNC: 12.2 G/DL (ref 12–17)
IGG SERPL-MCNC: 847 MG/DL (ref 635–1741)
IMM GRANULOCYTES # BLD AUTO: 0.39 THOUSAND/UL (ref 0–0.2)
IMM GRANULOCYTES NFR BLD AUTO: 8 % (ref 0–2)
LDH SERPL-CCNC: 275 U/L (ref 140–271)
LYMPHOCYTES # BLD AUTO: 0.71 THOUSANDS/ΜL (ref 0.6–4.47)
LYMPHOCYTES NFR BLD AUTO: 14 % (ref 14–44)
MCH RBC QN AUTO: 31.9 PG (ref 26.8–34.3)
MCHC RBC AUTO-ENTMCNC: 34.2 G/DL (ref 31.4–37.4)
MCV RBC AUTO: 93 FL (ref 82–98)
MONOCYTES # BLD AUTO: 0.74 THOUSAND/ΜL (ref 0.17–1.22)
MONOCYTES NFR BLD AUTO: 15 % (ref 4–12)
NEUTROPHILS # BLD AUTO: 3.18 THOUSANDS/ΜL (ref 1.85–7.62)
NEUTS SEG NFR BLD AUTO: 63 % (ref 43–75)
NRBC BLD AUTO-RTO: 0 /100 WBCS
PLATELET # BLD AUTO: 238 THOUSANDS/UL (ref 149–390)
PMV BLD AUTO: 9.2 FL (ref 8.9–12.7)
POTASSIUM SERPL-SCNC: 4.7 MMOL/L (ref 3.5–5.3)
PROT SERPL-MCNC: 7.5 G/DL (ref 6.4–8.4)
RBC # BLD AUTO: 3.83 MILLION/UL (ref 3.88–5.62)
SODIUM SERPL-SCNC: 134 MMOL/L (ref 135–147)
URATE SERPL-MCNC: 4.8 MG/DL (ref 3.5–8.5)
WBC # BLD AUTO: 5.04 THOUSAND/UL (ref 4.31–10.16)

## 2025-01-13 PROCEDURE — 36415 COLL VENOUS BLD VENIPUNCTURE: CPT

## 2025-01-13 PROCEDURE — 85025 COMPLETE CBC W/AUTO DIFF WBC: CPT

## 2025-01-13 PROCEDURE — 84550 ASSAY OF BLOOD/URIC ACID: CPT

## 2025-01-13 PROCEDURE — 83615 LACTATE (LD) (LDH) ENZYME: CPT

## 2025-01-13 PROCEDURE — 80053 COMPREHEN METABOLIC PANEL: CPT

## 2025-01-13 PROCEDURE — 82784 ASSAY IGA/IGD/IGG/IGM EACH: CPT

## 2025-01-15 ENCOUNTER — HOSPITAL ENCOUNTER (OUTPATIENT)
Dept: INFUSION CENTER | Facility: CLINIC | Age: 75
Discharge: HOME | End: 2025-01-15
Payer: MEDICARE

## 2025-01-15 VITALS
TEMPERATURE: 98.8 F | RESPIRATION RATE: 16 BRPM | BODY MASS INDEX: 29.78 KG/M2 | DIASTOLIC BLOOD PRESSURE: 74 MMHG | HEART RATE: 71 BPM | WEIGHT: 196.5 LBS | HEIGHT: 68 IN | SYSTOLIC BLOOD PRESSURE: 160 MMHG

## 2025-01-15 DIAGNOSIS — F51.01 PRIMARY INSOMNIA: ICD-10-CM

## 2025-01-15 DIAGNOSIS — C91.10 CLL (CHRONIC LYMPHOCYTIC LEUKEMIA) (HCC): Primary | ICD-10-CM

## 2025-01-15 RX ORDER — ZOLPIDEM TARTRATE 5 MG/1
TABLET ORAL
Qty: 90 TABLET | Refills: 0 | Status: SHIPPED | OUTPATIENT
Start: 2025-01-15 | End: 2025-01-16 | Stop reason: SDUPTHER

## 2025-01-15 RX ORDER — ACETAMINOPHEN 325 MG/1
650 TABLET ORAL ONCE
Status: COMPLETED | OUTPATIENT
Start: 2025-01-15 | End: 2025-01-15

## 2025-01-15 RX ORDER — SODIUM CHLORIDE 9 MG/ML
20 INJECTION, SOLUTION INTRAVENOUS ONCE
Status: COMPLETED | OUTPATIENT
Start: 2025-01-15 | End: 2025-01-15

## 2025-01-15 RX ADMIN — OBINUTUZUMAB 1000 MG: 1000 INJECTION, SOLUTION, CONCENTRATE INTRAVENOUS at 10:53

## 2025-01-15 RX ADMIN — DIPHENHYDRAMINE HYDROCHLORIDE 25 MG: 50 INJECTION, SOLUTION INTRAMUSCULAR; INTRAVENOUS at 10:24

## 2025-01-15 RX ADMIN — SODIUM CHLORIDE 20 ML/HR: 0.9 INJECTION, SOLUTION INTRAVENOUS at 09:45

## 2025-01-15 RX ADMIN — ACETAMINOPHEN 650 MG: 325 TABLET ORAL at 09:52

## 2025-01-15 RX ADMIN — DEXAMETHASONE SODIUM PHOSPHATE 20 MG: 10 INJECTION, SOLUTION INTRAMUSCULAR; INTRAVENOUS at 09:52

## 2025-01-15 NOTE — PLAN OF CARE
Problem: INFECTION - ADULT  Goal: Absence or prevention of progression during hospitalization  Description: INTERVENTIONS:  - Assess and monitor for signs and symptoms of infection  - Monitor lab/diagnostic results  - Monitor all insertion sites, i.e. indwelling lines, tubes, and drains  - Monitor endotracheal if appropriate and nasal secretions for changes in amount and color  - Ramer appropriate cooling/warming therapies per order  - Administer medications as ordered  - Instruct and encourage patient and family to use good hand hygiene technique  - Identify and instruct in appropriate isolation precautions for identified infection/condition  Outcome: Progressing  Goal: Absence of fever/infection during neutropenic period  Description: INTERVENTIONS:  - Monitor WBC    Outcome: Progressing     Problem: Knowledge Deficit  Goal: Patient/family/caregiver demonstrates understanding of disease process, treatment plan, medications, and discharge instructions  Description: Complete learning assessment and assess knowledge base.  Interventions:  - Provide teaching at level of understanding  - Provide teaching via preferred learning methods  Outcome: Progressing

## 2025-01-15 NOTE — TELEPHONE ENCOUNTER
Patient would like this expedited. He has a few tablets but he knows it will require a prior authorization. Please send to pharmacy so we can start a prior auth.     Reason for call:   [x] Refill   [] Prior Auth  [] Other:     Office:   [x] [place]CP/Provider - [facility]   [] Specialty/Provider -     Medication: Zolpidem 5mg    Dose/Frequency: 1/2 to 1 tablet hs     Quantity: 90    Pharmacy: [address] [phone]     Does the patient have enough for 3 days?   [] Yes   [x] No - Send as HP to POD

## 2025-01-15 NOTE — PROGRESS NOTES
Pt. Denied new symptoms or concerns today. Labs reviewed. Parameters met.  Pt. Tolerated Gayzva without adverse event. Pt. Will return 2/13/25 at 0900. AVS declined.

## 2025-01-16 ENCOUNTER — NURSE TRIAGE (OUTPATIENT)
Age: 75
End: 2025-01-16

## 2025-01-16 ENCOUNTER — OFFICE VISIT (OUTPATIENT)
Dept: CARDIOLOGY CLINIC | Facility: CLINIC | Age: 75
End: 2025-01-16
Payer: MEDICARE

## 2025-01-16 ENCOUNTER — TELEPHONE (OUTPATIENT)
Dept: FAMILY MEDICINE CLINIC | Facility: CLINIC | Age: 75
End: 2025-01-16

## 2025-01-16 ENCOUNTER — TELEPHONE (OUTPATIENT)
Age: 75
End: 2025-01-16

## 2025-01-16 VITALS
WEIGHT: 196.5 LBS | DIASTOLIC BLOOD PRESSURE: 62 MMHG | HEART RATE: 85 BPM | HEIGHT: 68 IN | SYSTOLIC BLOOD PRESSURE: 118 MMHG | BODY MASS INDEX: 29.78 KG/M2

## 2025-01-16 DIAGNOSIS — I48.0 PAROXYSMAL A-FIB (HCC): Primary | ICD-10-CM

## 2025-01-16 DIAGNOSIS — F51.01 PRIMARY INSOMNIA: ICD-10-CM

## 2025-01-16 PROCEDURE — 99214 OFFICE O/P EST MOD 30 MIN: CPT | Performed by: INTERNAL MEDICINE

## 2025-01-16 PROCEDURE — 93000 ELECTROCARDIOGRAM COMPLETE: CPT | Performed by: INTERNAL MEDICINE

## 2025-01-16 RX ORDER — ZOLPIDEM TARTRATE 5 MG/1
TABLET ORAL
Qty: 90 TABLET | Refills: 0 | Status: SHIPPED | OUTPATIENT
Start: 2025-01-16

## 2025-01-16 NOTE — TELEPHONE ENCOUNTER
"[facility] pharmacy relayed they received a prescription for Ambien from pcp, unable to transfer to [facility] because of this being a controlled substance. Please resend.     Reason for Disposition   Prescription prescribed recently is not at pharmacy and triager has access to patient's EMR and prescription is recorded in the EMR    Answer Assessment - Initial Assessment Questions  1. NAME of MEDICINE: \"What medicine(s) are you calling about?\"      zolpidem (AMBIEN) 5 mg tablet   2. QUESTION: \"What is your question?\" (e.g., double dose of medicine, side effect)      Medication sent to [facility] pharmacy, unable to transfer as it is a controlled substance, asking for this is to be sent to [facility]  3. PRESCRIBER: \"Who prescribed the medicine?\" Reason: if prescribed by specialist, call should be referred to that group.      Pravin Castaneda Jr., MD    Protocols used: Medication Question Call-Adult-[place]    "

## 2025-01-16 NOTE — TELEPHONE ENCOUNTER
Received call from pt asking to update his pharmacy in Psychiatric. He stated his preferred pharmacy is [facility] in [place]. Informed pt this pharmacy info is already in pt's chart. Informed pt [facility] in [place] is also in his chart which he would like to keep.     Towards the end of the call, he mentioned he has been having higher BP's since 12/7. When asking pt for his BP log, he stated he had to go to his appt with Dr. Charles today at 11:30am and does not have time to provide the info. Informed pt to bring the BP log to the office to have them be reordered. He voiced understanding. No further questions.

## 2025-01-16 NOTE — TELEPHONE ENCOUNTER
Incoming call:    Murphy requesting a pharmacy change from [facility] to [facility] @ [address] [place] [address] [phone]    Active in preferred pharmacies tab.

## 2025-01-16 NOTE — PROGRESS NOTES
HEART AND VASCULAR  CARDIAC ELECTROPHYSIOLOGY   HEART RHYTHM CENTER  [facility]    Outpatient   Follow-up  Today's Date: 01/16/25        Patient name: Murphy Willams  YOB: 1950  Sex: male         Chief Complaint: f/u referred for Watchman       ASSESSMENT:  Problem List Items Addressed This Visit          Cardiovascular and Mediastinum    Paroxysmal A-fib (HCC) - Primary    Relevant Orders    POCT ECG     73 yo male  1) Paroxysmal afib was in  setting of cardiac arrest March 2023- I do not see any afib episodeds on his BIV ICD checks since then  (APBIV pace 99%)  He is on Prfadaxa and Nadolol  2) Long QT2 on nadolol, grandson hadd LQT2 confirmed at [facility]  3) EF normal after recovered from cardiac arrest also CRT  4) CLL he is on Ventoclax which I looked up and not risk of thrombocytopenia. He was on another agent he has completed as well. Platelets are normal        I checked BIV ICD, no afib episodes, no VT    PLAN:  Do not recommend Watchman at this time, no significant bleeding, platelets are normal and his CLL med not risk of thrombocytopenia from what researched.   Also he doest seem to have afib since his cardiac arrest 2023. Perhaps doesn't need to remain on anticoagulation at all.  Will defer to DR Olsen and Myles for that.    Orders Placed This Encounter   Procedures    POCT ECG     There are no discontinued medications.      .............................................................................................    HPI/Subjective:     73 yo male h/o LQT cardiac arrest, afib in hospital at that time, BIV ICD referred from Dr Olsen for Watchman since new dx of CLL and concern could get thrombocytopenic on his regimen for this. Follows Dr Colón.    Please note HPI is listed by problem with with update following it, it is copied again in the assessment above and reflects medical decision making as well.   .           Past Medical History:   Diagnosis Date    Alcoholic  cirrhosis of liver without ascites (HCC) 10/29/2024    Anxiety     BPH (benign prostatic hypertrophy)     Cancer (HCC)     CLL    CLL (chronic lymphocytic leukemia) (HCC)     2009    Colon polyp     Concussion     Resolved: 08/22/16    COVID-19 12/29/2023    Depression     Diverticulitis 01/13/2020 2014 CT done 11/19 12/19 CT done     E coli bacteremia 06/27/2021    2/2 blood cultures with Ecoli  Source appears to be the urine     Epididymitis 05/19/2021 5/2021    Gastrointestinal hemorrhage     Last assessed: 08/27/13    GERD (gastroesophageal reflux disease)     H/O vitamin D deficiency 07/07/2021    Hearing loss of aging     Hiatal hernia     History of right hip replacement 04/19/2021    History of transfusion     Hyperlipidemia     Hypertension     Hyponatremia 03/06/2023    Iron deficiency anemia     Livedo reticularis 09/02/2024    Microscopic hematuria     Last assessed: 06/28/13    OA (osteoarthritis)     right hip    Obesity (BMI 30.0-34.9) 04/07/2022    Pneumothorax     Primary osteoarthritis of right hip 09/29/2017    He is status post right hip arthroplasty    Prostatitis     Pulmonary hypertension ([facility])     QT prolongation     Seasonal allergies     Sepsis ([facility]) 03/06/2023    Unresponsive episode 03/07/2023    Urinary tract infection associated with catheterization of urinary tract  ([facility]) 02/05/2021    Pseudomonal UTI asymptomatic.  Per Urology do not treat at this time.    Urinary tract infection associated with catheterization of urinary tract  ([facility]) 02/05/2021    Pseudomonal UTI asymptomatic.  Per Urology do not treat at this time.  He did have urosepsis from E coli 7/21    UTI (urinary tract infection)     in past    Ventricular fibrillation ([facility]) 03/06/2023    Noted after unresponsive episode      Wears glasses        Allergies   Allergen Reactions    Ciprofloxacin Other (See Comments)     LONG QT syndrome    Codeine Other (See Comments)     agitated    Sulfamethoxazole-Trimethoprim GI  Intolerance, Abdominal Pain and Other (See Comments)     upset stomach    Augmentin [Amoxicillin-Pot Clavulanate] Rash     Livedo reticularis    Macrobid [Nitrofurantoin] Dizziness, Headache and Fatigue     Per Patient     I reviewed the Home Medication list and Allergies in the chart.   Scheduled Meds:  Current Outpatient Medications   Medication Sig Dispense Refill    acetaminophen (TYLENOL) 325 mg tablet Take 2 tablets (650 mg total) by mouth every 6 (six) hours as needed for mild pain      allopurinol (ZYLOPRIM) 300 mg tablet Take 1 tablet (300 mg total) by mouth daily 90 tablet 1    ascorbic acid (VITAMIN C) 250 MG CHEW Chew 250 mg daily      atorvastatin (LIPITOR) 20 mg tablet Take 1 tablet (20 mg total) by mouth daily 100 tablet 3    Blood Pressure Monitoring (Omron 5 Series BP Monitor) PEE       dabigatran etexilate (PRADAXA) 150 mg capsu Take 1 capsule (150 mg total) by mouth 2 (two) times a day 180 capsule 3    hydroCHLOROthiazide 12.5 mg tablet Take 1 tablet (12.5 mg total) by mouth daily 100 tablet 3    Lactobacillus (PROBIOTIC ACIDOPHILUS PO) Take by mouth      LORazepam (ATIVAN) 0.5 mg tablet TAKE ONE TABLET BY MOUTH DAILY AS NEEDED for anxiety 60 tablet 1    montelukast (SINGULAIR) 10 mg tablet Take 1 tablet (10 mg total) by mouth daily (Patient taking differently: Take 10 mg by mouth as needed) 90 tablet 3    nadolol (CORGARD) 80 MG tablet Take 1 tablet (80 mg total) by mouth daily in the early morning 90 tablet 3    Omega-3 Fatty Acids (FISH OIL) 1,000 mg Take by mouth daily      omeprazole (PriLOSEC) 40 MG capsule Take one capsule by mouth once every day. 100 capsule 3    predniSONE 20 mg tablet Take 40 mg by mouth daily Day before each Gazyva treatment      Venetoclax 100 MG TABS Take 2 tablets (200 mg total) by mouth daily 60 tablet 5    vitamin B-12 (CYANOCOBALAMIN) 500 MCG TABS Take 1 tablet (500 mcg total) by mouth daily 30 tablet 0    zolpidem (AMBIEN) 5 mg tablet Take 1/2-1 full tablet at  "bedtime as needed for sleep.  Do not take if drinking any alcohol or taking lorazepam 90 tablet 0    furosemide (LASIX) 20 mg tablet Take 1 tablet (20 mg total) by mouth daily as needed (Increased lower extremity swelling) (Patient not taking: Reported on 2025) 90 tablet 5    ipratropium (ATROVENT) 0.03 % nasal spray 2 sprays into each nostril 2 (two) times a day (Patient not taking: Reported on 2025) 30 mL 3    Venetoclax 10 & 50 & 100 MG TBPK Will start in hospital  Take 20 mg by mouth daily for 7 days, then 50 mg daily for 7 days, then 100 mg daily for 7 days, then 200 mg daily thereafter (Patient not taking: Reported on 2025) 42 each 0     No current facility-administered medications for this visit.     Facility-Administered Medications Ordered in Other Visits   Medication Dose Route Frequency Provider Last Rate Last Admin    alteplase (CATHFLO) injection 2 mg  2 mg Intracatheter Q1MIN PRN David Hanley MD         PRN Meds:.        Family History   Problem Relation Age of Onset    No Known Problems Mother     Heart attack Father     Diabetes Brother     Prostate cancer Brother        Social History     Socioeconomic History    Marital status: /Civil Union     Spouse name: Not on file    Number of children: Not on file    Years of education: Not on file    Highest education level: Not on file   Occupational History    Not on file   Tobacco Use    Smoking status: Former     Current packs/day: 0.00     Types: Cigarettes     Quit date: 1980     Years since quittin.3     Passive exposure: Past    Smokeless tobacco: Never   Vaping Use    Vaping status: Never Used   Substance and Sexual Activity    Alcohol use: Yes     Alcohol/week: 2.0 - 4.0 standard drinks of alcohol     Types: 2 - 4 Cans of beer per week     Comment: rare    Drug use: Not Currently     Types: Marijuana     Comment: \"medical marijuana\"    Sexual activity: Not Currently   Other Topics Concern    Not on file   Social " "History Narrative    Not on file     Social Drivers of Health     Financial Resource Strain: Low Risk  (10/23/2023)    Overall Financial Resource Strain (CARDIA)     Difficulty of Paying Living Expenses: Not hard at all   Food Insecurity: No Food Insecurity (11/1/2024)    Hunger Vital Sign     Worried About Running Out of Food in the Last Year: Never true     Ran Out of Food in the Last Year: Never true   Transportation Needs: No Transportation Needs (11/1/2024)    PRAPARE - Transportation     Lack of Transportation (Medical): No     Lack of Transportation (Non-Medical): No   Physical Activity: Not on file   Stress: Not on file   Social Connections: Not on file   Intimate Partner Violence: Not on file   Housing Stability: Low Risk  (11/1/2024)    Housing Stability Vital Sign     Unable to Pay for Housing in the Last Year: No     Number of Times Moved in the Last Year: 1     Homeless in the Last Year: No         OBJECTIVE:    /62 (BP Location: Left arm, Patient Position: Sitting, Cuff Size: Large)   Pulse 85   Ht 5' 8.07\" (1.729 m)   Wt 89.1 kg (196 lb 8 oz)   BMI 29.82 kg/m²   Vitals:    01/16/25 1124   Weight: 89.1 kg (196 lb 8 oz)     GEN: No acute distress, Alert and oriented, well appearing  HEENT:External ears normal, oral pharynx clear, mucous membranes moist  EYES: Pupils equal, sclera anicteric, midline, normal conjuctiva  NECK: No JVD, supple, no obvious masses or thryomegaly or goiter  CARDIOVASCULAR:  RRR, No murmur, rub, gallops S1,S2  LUNGS: Clear To auscultation bilaterally, normal effort, no rales, rhonchi, crackles   ABDOMEN:  nondistended,  without obvious organomegaly or ascites  EXTREMITIES/VASCULAR:  No edema. warm an well perfused.  PSYCH: Normal Affect,  linear speech pattern without evidence of psychosis.   NEURO: Grossly intact, moving all extremiteis equal, face symmetric, alert and responsive, no obvious focal defecits   GAIT:  Ambulates normally without difficulty  HEME: No " "bleeding, bruising, petechia, purpura   SKIN: No significant rashes on visibile skin, warm, no diaphoresis or pallor.     Lab Results:       LABS:      Chemistry        Component Value Date/Time     (L) 07/20/2015 0849    K 4.7 01/13/2025 0731    K 4.3 07/20/2015 0849    CL 97 01/13/2025 0731    CL 99 (L) 07/20/2015 0849    CO2 31 01/13/2025 0731    CO2 25 01/10/2021 0931    BUN 10 01/13/2025 0731    BUN 11 07/20/2015 0849    CREATININE 0.72 01/13/2025 0731    CREATININE 0.86 07/20/2015 0849        Component Value Date/Time    CALCIUM 9.4 01/13/2025 0731    CALCIUM 8.9 07/20/2015 0849    ALKPHOS 114 (H) 01/13/2025 0731    ALKPHOS 78 07/20/2015 0849    AST 27 01/13/2025 0731    AST 29 07/20/2015 0849    ALT 24 01/13/2025 0731    ALT 48 07/20/2015 0849    BILITOT 0.47 07/20/2015 0849            Lab Results   Component Value Date    CHOL 144 01/20/2015    CHOL 146 07/01/2014    CHOL 151 01/10/2014     Lab Results   Component Value Date    HDL 35 (L) 11/04/2024    HDL 29 (L) 05/31/2024    HDL 28 (L) 02/20/2024     Lab Results   Component Value Date    LDLCALC 66 11/04/2024    LDLCALC 54 05/31/2024    LDLCALC 53 02/20/2024     Lab Results   Component Value Date    TRIG 123 11/04/2024    TRIG 111 05/31/2024    TRIG 116 02/20/2024     No results found for: \"CHOLHDL\"    IMAGING: Cardiac EP device report  Result Date: 12/19/2024  Narrative: MDT BI-V ICD (DDIR) ACTIVE SYSTEM IS MRI CONDITIONAL CARELINK TRANSMISSION: BATTERY VOLTAGE ADEQUATE (5.8 YRS). AP: 95.6%. : 99.9% + VSRP: <0.1%. ALL AVAILABLE LEAD PARAMETERS WITHIN NORMAL LIMITS. NO SIGNIFICANT HIGH RATE EPISODES. 35 V-SENSE EPISODES W/ AVAIL MARKERS SHOWING 16 BEATS @ 115 BPM & FUSION. PT TAKES PRADAXA, CORGARD. EF: 55% (ECHO 9/3/24). OPTI-VOL WITHIN NORMAL LIMITS. NORMAL DEVICE FUNCTION.         Cardiac testing:   Results for orders placed during the hospital encounter of 01/10/21    Echo complete with contrast if indicated    Narrative  [facility] " [facility]  [address] [place] [address]  [phone]    Transthoracic Echocardiogram  2D, M-mode, Doppler, and Color Doppler    Study date:  15-Som-2021    Patient: VANNA LEAL  MR number: [id number]  Account number: [id number]  : 1950  Age: 70 years  Gender: Male  Status: Inpatient  Location: ICU  Height: 66 in  Weight: 195 lb  BP: 149/ 71 mmHg    Indications: New V-Tach, COVID-19.    Diagnoses: I47.2 - Ventricular tachycardia    Sonographer:  HARJINDER Carrillo  Primary Physician:  Pravin Castaneda MD  Referring Physician:  Sandor Oswald MD  Group:  [facility]  Interpreting Physician:  Darrell Carrasco DO    SUMMARY    SUMMARY:  Limited study.    LEFT VENTRICLE:  Systolic function was normal. Ejection fraction was estimated to be 60 %.  Although no diagnostic regional wall motion abnormality was identified, this possibility cannot be completely excluded on the basis of this study.    RIGHT VENTRICLE:  The ventricle was mildly to moderately dilated.  Systolic function was mildly reduced. Free wall hypokinesis.    LEFT ATRIUM:  The atrium was dilated.    MITRAL VALVE:  There was mild regurgitation.    AORTIC VALVE:  There was mild to moderate regurgitation.    TRICUSPID VALVE:  There was moderate regurgitation.  Estimated peak PA pressure was 75 mmHg.    IVC, HEPATIC VEINS:  The inferior vena cava was dilated, with poor inspiratory collapse, consistent with elevated right atrial pressure.    HISTORY: PRIOR HISTORY: COVID-19 +, hypertension, PHTN, Luekemia, Alcohol Abuse, Former Smoker.    PROCEDURE: The study was performed in the ICU. This was a routine study. The transthoracic approach was used. The study included complete 2D imaging, M-mode, complete spectral Doppler, and color Doppler. The heart rate was 47 bpm, at the  start of the study. Images were obtained from the parasternal, apical, subcostal, and suprasternal notch acoustic windows. Image quality  was adequate.    LEFT VENTRICLE: Size was normal. Systolic function was normal. Ejection fraction was estimated to be 60 %. Although no diagnostic regional wall motion abnormality was identified, this possibility cannot be completely excluded on the basis  of this study.    RIGHT VENTRICLE: The ventricle was mildly to moderately dilated. Systolic function was mildly reduced. Free wall hypokinesis. Wall thickness was normal.    LEFT ATRIUM: The atrium was dilated.    RIGHT ATRIUM: Size was normal.    MITRAL VALVE: Valve structure was normal. There was normal leaflet separation. DOPPLER: The transmitral velocity was within the normal range. There was no evidence for stenosis. There was mild regurgitation.    AORTIC VALVE: The valve was trileaflet. Leaflets exhibited normal thickness and normal cuspal separation. DOPPLER: Transaortic velocity was within the normal range. There was no evidence for stenosis. There was mild to moderate  regurgitation.    TRICUSPID VALVE: The valve structure was normal. There was normal leaflet separation. DOPPLER: The transtricuspid velocity was within the normal range. There was no evidence for stenosis. There was moderate regurgitation. Estimated peak PA  pressure was 75 mmHg.    PULMONIC VALVE: Not well visualized.    PERICARDIUM: There was no pericardial effusion. The pericardium was normal in appearance.    AORTA: The root exhibited normal size.    SYSTEMIC VEINS: IVC: The inferior vena cava was dilated, with poor inspiratory collapse, consistent with elevated right atrial pressure.    SYSTEM MEASUREMENT TABLES    2D  %FS: 33.33 %  Ao Diam: 3.62 cm  EDV(Teich): 99.88 ml  EF(Teich): 62.02 %  ESV(Teich): 37.93 ml  IVSd: 1.15 cm  LA Area: 26.46 cm2  LA Diam: 4.59 cm  LVEDV MOD A4C: 166.8 ml  LVEF MOD A4C: 60.98 %  LVESV MOD A4C: 65.09 ml  LVIDd: 4.65 cm  LVIDs: 3.1 cm  LVLd A4C: 9.42 cm  LVLs A4C: 7.89 cm  LVPWd: 1.03 cm  RA Area: 16.54 cm2  RVIDd: 5.01 cm  SV MOD A4C: 101.71  ml  SV(Teich): 61.94 ml    CW  AR Dec Nolan: 3.09 m/s2  AR Dec Time: 1445.59 ms  AR PHT: 419.22 ms  AR Vmax: 4.47 m/s  AR maxP.75 mmHg  AV Env.Ti: 304.47 ms  AV MaxP.7 mmHg  AV VTI: 33.72 cm  AV Vmax: 1.78 m/s  AV Vmean: 1.11 m/s  AV meanP.58 mmHg  MR VTI: 204.77 cm  MR Vmax: 4.56 m/s  MR Vmean: 3.84 m/s  MR maxP.27 mmHg  MR meanP.52 mmHg  TR MaxP.73 mmHg  TR Vmax: 3.96 m/s    PW  LVOT Env.Ti: 374.88 ms  LVOT VTI: 24.53 cm  LVOT Vmax: 1.04 m/s  LVOT Vmean: 0.65 m/s  LVOT maxP.42 mmHg  LVOT meanP.05 mmHg  MV A Adithya: 0.16 m/s  MV Dec Nolan: 2.45 m/s2  MV DecT: 254.72 ms  MV E Adithya: 0.62 m/s  MV E/A Ratio: 3.99  MV PHT: 73.87 ms  MVA By PHT: 2.98 cm2    [employer] Accredited Echocardiography Laboratory    Prepared and electronically signed by    Darrell Carrasco DO  Signed 15-Som-2021 12:34:36    No results found for this or any previous visit.    No results found for this or any previous visit.    No results found for this or any previous visit.          I reviewed and interpreted the following LABS/EKG/TELE/IMAGING and below is summary of my interpretation (if data available):    LABS: platelets normal    Current EKG and Rhythm Strip: NSR with ectopy BIV pacing

## 2025-01-25 PROBLEM — Z12.5 SCREENING FOR PROSTATE CANCER: Status: RESOLVED | Noted: 2024-12-26 | Resolved: 2025-01-25

## 2025-01-27 ENCOUNTER — APPOINTMENT (OUTPATIENT)
Age: 75
End: 2025-01-27
Payer: MEDICARE

## 2025-01-27 DIAGNOSIS — C91.10 CLL (CHRONIC LYMPHOCYTIC LEUKEMIA) (HCC): ICD-10-CM

## 2025-01-27 LAB
ALBUMIN SERPL BCG-MCNC: 4.6 G/DL (ref 3.5–5)
ALP SERPL-CCNC: 122 U/L (ref 34–104)
ALT SERPL W P-5'-P-CCNC: 30 U/L (ref 7–52)
ANION GAP SERPL CALCULATED.3IONS-SCNC: 9 MMOL/L (ref 4–13)
AST SERPL W P-5'-P-CCNC: 26 U/L (ref 13–39)
BASOPHILS # BLD AUTO: 0.02 THOUSANDS/ΜL (ref 0–0.1)
BASOPHILS NFR BLD AUTO: 0 % (ref 0–1)
BILIRUB SERPL-MCNC: 0.92 MG/DL (ref 0.2–1)
BUN SERPL-MCNC: 11 MG/DL (ref 5–25)
CALCIUM SERPL-MCNC: 9.2 MG/DL (ref 8.4–10.2)
CHLORIDE SERPL-SCNC: 96 MMOL/L (ref 96–108)
CO2 SERPL-SCNC: 30 MMOL/L (ref 21–32)
CREAT SERPL-MCNC: 0.62 MG/DL (ref 0.6–1.3)
EOSINOPHIL # BLD AUTO: 0 THOUSAND/ΜL (ref 0–0.61)
EOSINOPHIL NFR BLD AUTO: 0 % (ref 0–6)
ERYTHROCYTE [DISTWIDTH] IN BLOOD BY AUTOMATED COUNT: 17 % (ref 11.6–15.1)
GFR SERPL CREATININE-BSD FRML MDRD: 99 ML/MIN/1.73SQ M
GLUCOSE SERPL-MCNC: 129 MG/DL (ref 65–140)
HCT VFR BLD AUTO: 36 % (ref 36.5–49.3)
HGB BLD-MCNC: 12.1 G/DL (ref 12–17)
IMM GRANULOCYTES # BLD AUTO: 0.15 THOUSAND/UL (ref 0–0.2)
IMM GRANULOCYTES NFR BLD AUTO: 2 % (ref 0–2)
LDH SERPL-CCNC: 275 U/L (ref 140–271)
LYMPHOCYTES # BLD AUTO: 0.58 THOUSANDS/ΜL (ref 0.6–4.47)
LYMPHOCYTES NFR BLD AUTO: 9 % (ref 14–44)
MCH RBC QN AUTO: 31.7 PG (ref 26.8–34.3)
MCHC RBC AUTO-ENTMCNC: 33.6 G/DL (ref 31.4–37.4)
MCV RBC AUTO: 94 FL (ref 82–98)
MONOCYTES # BLD AUTO: 0.75 THOUSAND/ΜL (ref 0.17–1.22)
MONOCYTES NFR BLD AUTO: 11 % (ref 4–12)
NEUTROPHILS # BLD AUTO: 5.15 THOUSANDS/ΜL (ref 1.85–7.62)
NEUTS SEG NFR BLD AUTO: 78 % (ref 43–75)
NRBC BLD AUTO-RTO: 0 /100 WBCS
PLATELET # BLD AUTO: 197 THOUSANDS/UL (ref 149–390)
PMV BLD AUTO: 9.6 FL (ref 8.9–12.7)
POTASSIUM SERPL-SCNC: 4.4 MMOL/L (ref 3.5–5.3)
PROT SERPL-MCNC: 7.1 G/DL (ref 6.4–8.4)
RBC # BLD AUTO: 3.82 MILLION/UL (ref 3.88–5.62)
SODIUM SERPL-SCNC: 135 MMOL/L (ref 135–147)
URATE SERPL-MCNC: 4.5 MG/DL (ref 3.5–8.5)
WBC # BLD AUTO: 6.65 THOUSAND/UL (ref 4.31–10.16)

## 2025-01-27 PROCEDURE — 36415 COLL VENOUS BLD VENIPUNCTURE: CPT

## 2025-01-27 PROCEDURE — 83615 LACTATE (LD) (LDH) ENZYME: CPT

## 2025-01-27 PROCEDURE — 84550 ASSAY OF BLOOD/URIC ACID: CPT

## 2025-01-27 PROCEDURE — 80053 COMPREHEN METABOLIC PANEL: CPT

## 2025-01-27 PROCEDURE — 85025 COMPLETE CBC W/AUTO DIFF WBC: CPT

## 2025-01-28 NOTE — ASSESSMENT & PLAN NOTE
Continue close follow-up with heme-onc for this as well as CLL.       
Continue close follow-up with oncology.  Fortunately, he is doing quite well status post recent hospitalization for induction of venetoclax.       
Continue routine monitoring of A1c.  Recent labs look excellent in the hospital.       
He does appear to be in sinus rhythm today.  Continue close follow-up with myself as well as cardiology.       
He has had a lot of stress lately but seems to be managing pretty well.         
He has labs pending for oncology       
He relates cut back quite a bit on drinking.  Main risk factor is CLL at this point.       
Labs are pending per oncology       
Refilled montelukast at patient's request.  Orders:    montelukast (SINGULAIR) 10 mg tablet; Take 1 tablet (10 mg total) by mouth daily    
Stable clinically.  Patient is aware to mention with any prescriptions offered.       
Symptoms are stable at this time.       
Well-controlled this time.       
Wt Readings from Last 3 Encounters:   11/12/24 88.6 kg (195 lb 6.4 oz)   11/07/24 89 kg (196 lb 3.2 oz)   11/01/24 88 kg (194 lb)     He seems quite stable from a cardiology perspective.               
Home

## 2025-01-29 DIAGNOSIS — Z00.6 ENCOUNTER FOR EXAMINATION FOR NORMAL COMPARISON OR CONTROL IN CLINICAL RESEARCH PROGRAM: ICD-10-CM

## 2025-02-04 ENCOUNTER — OFFICE VISIT (OUTPATIENT)
Dept: HEMATOLOGY ONCOLOGY | Facility: CLINIC | Age: 75
End: 2025-02-04
Payer: MEDICARE

## 2025-02-04 VITALS
RESPIRATION RATE: 18 BRPM | TEMPERATURE: 97.6 F | WEIGHT: 195 LBS | HEIGHT: 68 IN | OXYGEN SATURATION: 98 % | SYSTOLIC BLOOD PRESSURE: 128 MMHG | HEART RATE: 93 BPM | DIASTOLIC BLOOD PRESSURE: 74 MMHG | BODY MASS INDEX: 29.55 KG/M2

## 2025-02-04 DIAGNOSIS — I48.0 PAROXYSMAL A-FIB (HCC): ICD-10-CM

## 2025-02-04 DIAGNOSIS — I27.20 PULMONARY HYPERTENSION (HCC): ICD-10-CM

## 2025-02-04 DIAGNOSIS — C91.10 CLL (CHRONIC LYMPHOCYTIC LEUKEMIA) (HCC): Primary | ICD-10-CM

## 2025-02-04 DIAGNOSIS — D59.10 AUTOIMMUNE HEMOLYTIC ANEMIA (HCC): ICD-10-CM

## 2025-02-04 PROCEDURE — 99214 OFFICE O/P EST MOD 30 MIN: CPT | Performed by: INTERNAL MEDICINE

## 2025-02-04 PROCEDURE — G2211 COMPLEX E/M VISIT ADD ON: HCPCS | Performed by: INTERNAL MEDICINE

## 2025-02-04 RX ORDER — PREDNISONE 10 MG/1
40 TABLET ORAL DAILY
Qty: 4 TABLET | Refills: 0 | Status: SHIPPED | OUTPATIENT
Start: 2025-02-04 | End: 2025-02-05

## 2025-02-04 NOTE — PROGRESS NOTES
Name: Murphy Willams      : 1950      MRN: [id number]  Encounter Provider: David Hanley MD  Encounter Date: 2025   Encounter department: [facility]AT[facility]  :  Assessment & Plan  CLL (chronic lymphocytic leukemia) (HCC)  CLL was diagnosed and , had not required treatment until  when he had episode of acute autoimmune hemolytic anemia that was treated with prednisone and folic acid.    Patient started on Gazyva in 2024, venetoclax was added on 2024.  Labs reviewed, patient continues to respond to about treatment.  He is on venetoclax 200 mg, and continues to be on allopurinol.   Given history of A-fib and being on Pradaxa he was not a good candidate for BTK inhibitor.  Patient got second opinion at [facility], Dr. Barba agreed with Gazyva for 6 cycles and Venetoclax.  Patient wants to continue his care with [facility].  Monitor CBC, CMP, and uric acid monthly after his sixth cycle that is scheduled for .    Orders:    predniSONE 10 mg tablet; Take 4 tablets (40 mg total) by mouth daily for 1 day Day before each Gazyva treatment. Take it with food.    Autoimmune hemolytic anemia (HCC)  Found to have profound autoimmune hemolytic anemia with significant drop in hemoglobin in May 2021 when he was hospitalized due to COVID. Treated with prednisone.   Hemoglobin within normal limit on recent lab work.       Paroxysmal A-fib (HCC)  Managed by cardiology.  He also has pacemaker placed.       Pulmonary hypertension (HCC)  History noted, managed by pulmonary.       History of Present Illness   Chief Complaint   Patient presents with    Follow-up     Patient is a 74-year-old male diagnosed with CLL in .  He was recently admitted to hospital and found to have profound anemia with no evidence of bleeding.  Workup remarkable for increased reticulocyte count and increased bilirubin that was primarily indirect, some  evidence of hemolysis.  He received blood transfusions in the hospital.  Had bone marrow biopsy on 9/9/2024 that was remarkable for CLL, FISH with chromosome 17 and 13 deletion, T p53. Patient not good candidate for BTK inhibitor due to A-fib and was on Xarelto that is recently switched to Pradaxa.  Patient received day 1 and day 2 Gazyva in hospital, day 8 and 15 on 9/18/24 and 9/26/24 respectively. Cycle 2 scheduled on 10/9/24.  Venetoclax will be added to the regimen however will need to be hospitalized for 2 to 3 days to monitor for tumor lysis syndrome given high risk, and highly encouraged not to start venetoclax at home.  There was also concern for Pradaxa and venetoclax interection, checked on up-to-date no interaction has been found.  Per pharmacy it has to be spaced out at least 6 hours, instruction to be made when he is in hospital.  Also not planning for full dose venetoclax, will be 200 mg.     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    Oncology History   Cancer Staging   No matching staging information was found for the patient.  Oncology History   CLL (chronic lymphocytic leukemia) ([facility])   9/29/2017 Initial Diagnosis    CLL (chronic lymphocytic leukemia) ([facility])     9/10/2024 -  Chemotherapy    alteplase (CATHFLO), 2 mg, Intracatheter, Every 1 Minute as needed, 5 of 6 cycles  obinutuzumab (GAZYVA) day 1 IVPB, 100 mg, Intravenous, Once, 1 of 1 cycle  Administration: 100 mg (9/11/2024)  obinutuzumab (GAZYVA) day 2 titrated infusion, 900 mg, Intravenous, Once, 1 of 1 cycle  Administration: 900 mg (9/11/2024)  obinutuzumab (GAZYVA) subsequent titrated infusion, 1,000 mg, Intravenous, Once, 5 of 6 cycles  Administration: 1,000 mg (9/18/2024), 1,000  "mg (2024), 1,000 mg (10/24/2024), 1,000 mg (2024), 1,000 mg (2024)        Pertinent Medical History   25: Patient is seen in office accompanied by his wife.  Since last seen in office he got second opinion at [facility] with Dr. Barba who agreed with current regimen.  Patient has been tolerating venetoclax well except occasional constipation that is manageable with MiraLAX.  He denies any other active symptoms.    Review of Systems   Constitutional:  Negative for activity change, diaphoresis and unexpected weight change.   Respiratory:  Negative for cough, chest tightness and wheezing.    Cardiovascular:  Negative for chest pain and palpitations.   Gastrointestinal:  Negative for abdominal distention and blood in stool.   All other systems reviewed and are negative.    Social History     Tobacco Use    Smoking status: Former     Current packs/day: 0.00     Types: Cigarettes     Quit date: 1980     Years since quittin.4     Passive exposure: Past    Smokeless tobacco: Never   Vaping Use    Vaping status: Never Used   Substance and Sexual Activity    Alcohol use: Yes     Alcohol/week: 2.0 - 4.0 standard drinks of alcohol     Types: 2 - 4 Cans of beer per week     Comment: rare    Drug use: Not Currently     Types: Marijuana     Comment: \"medical marijuana\"    Sexual activity: Not Currently         Objective   /74 (BP Location: Left arm, Patient Position: Sitting, Cuff Size: Adult)   Pulse 93   Temp 97.6 °F (36.4 °C) (Temporal)   Resp 18   Ht 5' 8\" (1.727 m)   Wt 88.5 kg (195 lb)   SpO2 98%   BMI 29.65 kg/m²     Pain Screening:  Pain Score: 0-No pain  ECOG   1  Physical Exam  Constitutional:       Appearance: Normal appearance.   HENT:      Head: Normocephalic and atraumatic.   Eyes:      Conjunctiva/sclera: Conjunctivae normal.      Pupils: Pupils are equal, round, and reactive to light.   Cardiovascular:      Rate and Rhythm: Normal rate and regular rhythm.      " Pulses: Normal pulses.      Heart sounds: Normal heart sounds.   Pulmonary:      Effort: Pulmonary effort is normal.      Breath sounds: Normal breath sounds.   Abdominal:      General: Abdomen is flat. Bowel sounds are normal.   Musculoskeletal:      Right lower leg: No edema.      Left lower leg: No edema.   Skin:     General: Skin is warm and dry.   Neurological:      Mental Status: He is alert and oriented to person, place, and time. Mental status is at baseline.         Labs: I have reviewed the following labs:  Lab Results   Component Value Date/Time    WBC 6.65 01/27/2025 08:14 AM    RBC 3.82 (L) 01/27/2025 08:14 AM    Hemoglobin 12.1 01/27/2025 08:14 AM    Hematocrit 36.0 (L) 01/27/2025 08:14 AM    MCV 94 01/27/2025 08:14 AM    MCH 31.7 01/27/2025 08:14 AM    RDW 17.0 (H) 01/27/2025 08:14 AM    Platelets 197 01/27/2025 08:14 AM    Segmented % 78 (H) 01/27/2025 08:14 AM    Lymphocytes % 9 (L) 01/27/2025 08:14 AM    Monocytes % 11 01/27/2025 08:14 AM    Eosinophils Relative 0 01/27/2025 08:14 AM    Basophils Relative 0 01/27/2025 08:14 AM    Immature Grans % 2 01/27/2025 08:14 AM    Absolute Neutrophils 5.15 01/27/2025 08:14 AM     Lab Results   Component Value Date/Time    Potassium 4.4 01/27/2025 08:14 AM    Chloride 96 01/27/2025 08:14 AM    CO2 30 01/27/2025 08:14 AM    BUN 11 01/27/2025 08:14 AM    Creatinine 0.62 01/27/2025 08:14 AM    Glucose, Fasting 114 (H) 01/13/2025 07:31 AM    Calcium 9.2 01/27/2025 08:14 AM    AST 26 01/27/2025 08:14 AM    ALT 30 01/27/2025 08:14 AM    Alkaline Phosphatase 122 (H) 01/27/2025 08:14 AM    Total Protein 7.1 01/27/2025 08:14 AM    Albumin 4.6 01/27/2025 08:14 AM    Total Bilirubin 0.92 01/27/2025 08:14 AM    eGFR 99 01/27/2025 08:14 AM     Lab Results   Component Value Date/Time    Magnesium 1.9 11/07/2024 05:12 AM    TSH 3RD GENERATON 1.614 11/11/2024 07:16 AM      CT Chest Abdomen Pelvis 12/5/24:  IMPRESSION:        1. Interval resolution of right middle lobe  pneumonia. Residual scarring and atelectasis. Small focus of loculated fluid in the adjacent major fissure.  2. No acute intra-abdominal pelvic process.

## 2025-02-04 NOTE — ASSESSMENT & PLAN NOTE
CLL was diagnosed and 2010, had not required treatment until 2021 when he had episode of acute autoimmune hemolytic anemia that was treated with prednisone and folic acid.    Patient started on Gazyva in September 2024, venetoclax was added on 11/6/2024.  Labs reviewed, patient continues to respond to about treatment.  He is on venetoclax 200 mg, and continues to be on allopurinol.   Given history of A-fib and being on Pradaxa he was not a good candidate for BTK inhibitor.  Patient got second opinion at [facility], Dr. Barba agreed with Gazyva for 6 cycles and Venetoclax.  Patient wants to continue his care with [facility].  Monitor CBC, CMP, and uric acid monthly after his sixth cycle that is scheduled for February 13.    Orders:    predniSONE 10 mg tablet; Take 4 tablets (40 mg total) by mouth daily for 1 day Day before each Gazyva treatment. Take it with food.

## 2025-02-04 NOTE — ASSESSMENT & PLAN NOTE
Found to have profound autoimmune hemolytic anemia with significant drop in hemoglobin in May 2021 when he was hospitalized due to COVID. Treated with prednisone.   Hemoglobin within normal limit on recent lab work.

## 2025-02-06 RX ORDER — SODIUM CHLORIDE 9 MG/ML
20 INJECTION, SOLUTION INTRAVENOUS ONCE
Status: CANCELLED | OUTPATIENT
Start: 2025-02-13

## 2025-02-06 RX ORDER — ACETAMINOPHEN 325 MG/1
650 TABLET ORAL ONCE
Status: CANCELLED | OUTPATIENT
Start: 2025-02-13

## 2025-02-11 ENCOUNTER — APPOINTMENT (OUTPATIENT)
Dept: LAB | Facility: HOSPITAL | Age: 75
End: 2025-02-11
Payer: MEDICARE

## 2025-02-11 ENCOUNTER — TELEPHONE (OUTPATIENT)
Age: 75
End: 2025-02-11

## 2025-02-11 DIAGNOSIS — Z00.6 ENCOUNTER FOR EXAMINATION FOR NORMAL COMPARISON OR CONTROL IN CLINICAL RESEARCH PROGRAM: ICD-10-CM

## 2025-02-11 DIAGNOSIS — C91.10 CLL (CHRONIC LYMPHOCYTIC LEUKEMIA) (HCC): ICD-10-CM

## 2025-02-11 LAB
ALBUMIN SERPL BCG-MCNC: 4.7 G/DL (ref 3.5–5)
ALP SERPL-CCNC: 129 U/L (ref 34–104)
ALT SERPL W P-5'-P-CCNC: 28 U/L (ref 7–52)
ANION GAP SERPL CALCULATED.3IONS-SCNC: 6 MMOL/L (ref 4–13)
ANISOCYTOSIS BLD QL SMEAR: PRESENT
AST SERPL W P-5'-P-CCNC: 24 U/L (ref 13–39)
BASOPHILS # BLD MANUAL: 0 THOUSAND/UL (ref 0–0.1)
BASOPHILS NFR MAR MANUAL: 0 % (ref 0–1)
BILIRUB SERPL-MCNC: 1.14 MG/DL (ref 0.2–1)
BUN SERPL-MCNC: 10 MG/DL (ref 5–25)
CALCIUM SERPL-MCNC: 9.4 MG/DL (ref 8.4–10.2)
CHLORIDE SERPL-SCNC: 97 MMOL/L (ref 96–108)
CO2 SERPL-SCNC: 30 MMOL/L (ref 21–32)
CREAT SERPL-MCNC: 0.76 MG/DL (ref 0.6–1.3)
DOHLE BOD BLD QL SMEAR: PRESENT
EOSINOPHIL # BLD MANUAL: 0 THOUSAND/UL (ref 0–0.4)
EOSINOPHIL NFR BLD MANUAL: 0 % (ref 0–6)
ERYTHROCYTE [DISTWIDTH] IN BLOOD BY AUTOMATED COUNT: 17.3 % (ref 11.6–15.1)
GFR SERPL CREATININE-BSD FRML MDRD: 91 ML/MIN/1.73SQ M
GLUCOSE P FAST SERPL-MCNC: 112 MG/DL (ref 65–99)
HCT VFR BLD AUTO: 36.5 % (ref 36.5–49.3)
HGB BLD-MCNC: 12.4 G/DL (ref 12–17)
LDH SERPL-CCNC: 212 U/L (ref 140–271)
LYMPHOCYTES # BLD AUTO: 0.75 THOUSAND/UL (ref 0.6–4.47)
LYMPHOCYTES # BLD AUTO: 15 % (ref 14–44)
MCH RBC QN AUTO: 32.3 PG (ref 26.8–34.3)
MCHC RBC AUTO-ENTMCNC: 34 G/DL (ref 31.4–37.4)
MCV RBC AUTO: 95 FL (ref 82–98)
METAMYELOCYTE ABSOLUTE CT: 0.05 THOUSAND/UL (ref 0–0.1)
METAMYELOCYTES NFR BLD MANUAL: 1 % (ref 0–1)
MICROCYTES BLD QL AUTO: PRESENT
MONOCYTES # BLD AUTO: 0.75 THOUSAND/UL (ref 0–1.22)
MONOCYTES NFR BLD: 16 % (ref 4–12)
MYELOCYTE ABSOLUTE CT: 0.05 THOUSAND/UL (ref 0–0.1)
MYELOCYTES NFR BLD MANUAL: 1 % (ref 0–1)
NEUTROPHILS # BLD MANUAL: 3.08 THOUSAND/UL (ref 1.85–7.62)
NEUTS BAND NFR BLD MANUAL: 5 % (ref 0–8)
NEUTS SEG NFR BLD AUTO: 61 % (ref 43–75)
OVALOCYTES BLD QL SMEAR: PRESENT
PLATELET # BLD AUTO: 209 THOUSANDS/UL (ref 149–390)
PLATELET BLD QL SMEAR: ADEQUATE
PLATELET CLUMP BLD QL SMEAR: PRESENT
PMV BLD AUTO: 9.1 FL (ref 8.9–12.7)
POLYCHROMASIA BLD QL SMEAR: PRESENT
POTASSIUM SERPL-SCNC: 4 MMOL/L (ref 3.5–5.3)
PROT SERPL-MCNC: 7.4 G/DL (ref 6.4–8.4)
RBC # BLD AUTO: 3.84 MILLION/UL (ref 3.88–5.62)
RBC MORPH BLD: PRESENT
SODIUM SERPL-SCNC: 133 MMOL/L (ref 135–147)
URATE SERPL-MCNC: 4.8 MG/DL (ref 3.5–8.5)
VARIANT LYMPHS # BLD AUTO: 1 %
WBC # BLD AUTO: 4.66 THOUSAND/UL (ref 4.31–10.16)

## 2025-02-11 PROCEDURE — 85007 BL SMEAR W/DIFF WBC COUNT: CPT

## 2025-02-11 PROCEDURE — 84550 ASSAY OF BLOOD/URIC ACID: CPT

## 2025-02-11 PROCEDURE — 83615 LACTATE (LD) (LDH) ENZYME: CPT

## 2025-02-11 PROCEDURE — 80053 COMPREHEN METABOLIC PANEL: CPT

## 2025-02-11 PROCEDURE — 85027 COMPLETE CBC AUTOMATED: CPT

## 2025-02-11 PROCEDURE — 36415 COLL VENOUS BLD VENIPUNCTURE: CPT

## 2025-02-11 NOTE — TELEPHONE ENCOUNTER
Pt had called in regarding standing order questions, he had previously spoken with someone from the department, and the call got disconnected, pt was upset that no one had called him back as he was not the one who had disconnected the call. Answered all questions pt had, pt thanked me for the time and answering his questions.

## 2025-02-13 ENCOUNTER — HOSPITAL ENCOUNTER (OUTPATIENT)
Dept: INFUSION CENTER | Facility: CLINIC | Age: 75
Discharge: HOME | End: 2025-02-13
Payer: MEDICARE

## 2025-02-13 VITALS
SYSTOLIC BLOOD PRESSURE: 167 MMHG | HEART RATE: 97 BPM | WEIGHT: 201.5 LBS | DIASTOLIC BLOOD PRESSURE: 88 MMHG | RESPIRATION RATE: 18 BRPM | TEMPERATURE: 97 F | HEIGHT: 68 IN | BODY MASS INDEX: 30.54 KG/M2

## 2025-02-13 DIAGNOSIS — C91.10 CLL (CHRONIC LYMPHOCYTIC LEUKEMIA) (HCC): Primary | ICD-10-CM

## 2025-02-13 PROCEDURE — 96367 TX/PROPH/DG ADDL SEQ IV INF: CPT

## 2025-02-13 PROCEDURE — 96413 CHEMO IV INFUSION 1 HR: CPT

## 2025-02-13 PROCEDURE — 96415 CHEMO IV INFUSION ADDL HR: CPT

## 2025-02-13 RX ORDER — SODIUM CHLORIDE 9 MG/ML
20 INJECTION, SOLUTION INTRAVENOUS ONCE
Status: COMPLETED | OUTPATIENT
Start: 2025-02-13 | End: 2025-02-13

## 2025-02-13 RX ORDER — ACETAMINOPHEN 325 MG/1
650 TABLET ORAL ONCE
Status: COMPLETED | OUTPATIENT
Start: 2025-02-13 | End: 2025-02-13

## 2025-02-13 RX ADMIN — DIPHENHYDRAMINE HYDROCHLORIDE 25 MG: 50 INJECTION, SOLUTION INTRAMUSCULAR; INTRAVENOUS at 09:46

## 2025-02-13 RX ADMIN — SODIUM CHLORIDE 20 ML/HR: 0.9 INJECTION, SOLUTION INTRAVENOUS at 09:27

## 2025-02-13 RX ADMIN — OBINUTUZUMAB 1000 MG: 1000 INJECTION, SOLUTION, CONCENTRATE INTRAVENOUS at 10:49

## 2025-02-13 RX ADMIN — ACETAMINOPHEN 650 MG: 325 TABLET ORAL at 09:26

## 2025-02-13 RX ADMIN — DEXAMETHASONE SODIUM PHOSPHATE 20 MG: 10 INJECTION, SOLUTION INTRAMUSCULAR; INTRAVENOUS at 09:26

## 2025-02-13 NOTE — PROGRESS NOTES
Murphy Willams  tolerated treatment well with no complications.      Murphy Willams is aware of future appt on *** at ***.     AVS printed and given to Murphy Willams:  Yes ***  No (Declined by Murphy Willams) ***

## 2025-02-14 ENCOUNTER — TELEPHONE (OUTPATIENT)
Age: 75
End: 2025-02-14

## 2025-02-14 ENCOUNTER — DOCUMENTATION (OUTPATIENT)
Dept: HEMATOLOGY ONCOLOGY | Facility: CLINIC | Age: 75
End: 2025-02-14

## 2025-02-14 NOTE — TELEPHONE ENCOUNTER
Patient calling in to get some clarification because he noticed in his after visit summary that his Pradaxa and Venetoclax should be  by 6 hours. He spoke to a pharmacist who told him the same and he is wondering why. He states he has been taking his medications as follows for the past four months with no issues:    Pradaxa twice a day, morning and night with food.  Venetoclax either 1-2 hours after his morning dose of Pradaxa or at lunchtime, with food.    He has never  the medications by six hours and states his labs have always been good. He states he was told there could be slight interaction between the two medications that could effect his platelets, but he states his platelets have been good with his labs he's had drawn.    He would like clarification as to whether or not these two medications can be taken around the same time, as he has been taking them or if it is truly necessary to take them six hours apart. He would also like to know from Dr. Hanley and/or Dr. Olsen (ordering provider for Pradaxa) why they are now telling him not to take them unless it is six hours apart. He states changing the schedule to accommodate this will be difficult for him.    Patient requests call back at [phone] to clarify.

## 2025-02-14 NOTE — TELEPHONE ENCOUNTER
"Called patient and made him aware that per KONG Loya PA-C, \"The risk is low.   He can continue to take it as he has been.\"    Patient verbalized understanding.  "

## 2025-02-14 NOTE — PROGRESS NOTES
PT obtained BRITTANY reveles to help with his Venclexta medication. Details below                    Mo Davidson  Phone:[phone]  Email:[email or web address]

## 2025-02-19 ENCOUNTER — RA CDI HCC (OUTPATIENT)
Dept: OTHER | Facility: HOSPITAL | Age: 75
End: 2025-02-19

## 2025-02-22 LAB
APOB+LDLR+PCSK9 GENE MUT ANL BLD/T: NOT DETECTED
BRCA1+BRCA2 DEL+DUP + FULL MUT ANL BLD/T: NOT DETECTED
MLH1+MSH2+MSH6+PMS2 GN DEL+DUP+FUL M: NOT DETECTED

## 2025-02-26 ENCOUNTER — OFFICE VISIT (OUTPATIENT)
Dept: FAMILY MEDICINE CLINIC | Facility: CLINIC | Age: 75
End: 2025-02-26
Payer: MEDICARE

## 2025-02-26 ENCOUNTER — OFFICE VISIT (OUTPATIENT)
Dept: UROLOGY | Facility: CLINIC | Age: 75
End: 2025-02-26
Payer: MEDICARE

## 2025-02-26 VITALS
HEIGHT: 68 IN | OXYGEN SATURATION: 98 % | SYSTOLIC BLOOD PRESSURE: 136 MMHG | BODY MASS INDEX: 30.62 KG/M2 | HEART RATE: 92 BPM | DIASTOLIC BLOOD PRESSURE: 82 MMHG | RESPIRATION RATE: 12 BRPM | WEIGHT: 202 LBS

## 2025-02-26 VITALS
HEIGHT: 68 IN | WEIGHT: 202 LBS | OXYGEN SATURATION: 98 % | DIASTOLIC BLOOD PRESSURE: 90 MMHG | HEART RATE: 88 BPM | SYSTOLIC BLOOD PRESSURE: 164 MMHG | BODY MASS INDEX: 30.62 KG/M2

## 2025-02-26 DIAGNOSIS — D59.10 AUTOIMMUNE HEMOLYTIC ANEMIA (HCC): ICD-10-CM

## 2025-02-26 DIAGNOSIS — K21.9 GASTROESOPHAGEAL REFLUX DISEASE WITHOUT ESOPHAGITIS: ICD-10-CM

## 2025-02-26 DIAGNOSIS — I10 ESSENTIAL HYPERTENSION: Chronic | ICD-10-CM

## 2025-02-26 DIAGNOSIS — I45.81 LONG QT SYNDROME TYPE 2: ICD-10-CM

## 2025-02-26 DIAGNOSIS — F33.9 DEPRESSION, RECURRENT (HCC): ICD-10-CM

## 2025-02-26 DIAGNOSIS — E78.00 HYPERCHOLESTEROLEMIA: Chronic | ICD-10-CM

## 2025-02-26 DIAGNOSIS — I27.20 PULMONARY HYPERTENSION (HCC): Chronic | ICD-10-CM

## 2025-02-26 DIAGNOSIS — E87.1 HYPONATREMIA: ICD-10-CM

## 2025-02-26 DIAGNOSIS — I48.0 PAROXYSMAL A-FIB (HCC): Primary | ICD-10-CM

## 2025-02-26 DIAGNOSIS — R33.9 URINARY RETENTION: ICD-10-CM

## 2025-02-26 DIAGNOSIS — R73.9 HYPERGLYCEMIA: ICD-10-CM

## 2025-02-26 DIAGNOSIS — I50.42 CHRONIC COMBINED SYSTOLIC AND DIASTOLIC CONGESTIVE HEART FAILURE (HCC): ICD-10-CM

## 2025-02-26 DIAGNOSIS — N31.9 NEUROGENIC BLADDER: Primary | ICD-10-CM

## 2025-02-26 DIAGNOSIS — C91.10 CLL (CHRONIC LYMPHOCYTIC LEUKEMIA) (HCC): Chronic | ICD-10-CM

## 2025-02-26 PROCEDURE — 99214 OFFICE O/P EST MOD 30 MIN: CPT | Performed by: FAMILY MEDICINE

## 2025-02-26 PROCEDURE — 99213 OFFICE O/P EST LOW 20 MIN: CPT | Performed by: UROLOGY

## 2025-02-26 PROCEDURE — G2211 COMPLEX E/M VISIT ADD ON: HCPCS | Performed by: FAMILY MEDICINE

## 2025-02-26 RX ORDER — HYDROCHLOROTHIAZIDE 25 MG/1
25 TABLET ORAL DAILY
Qty: 100 TABLET | Refills: 3 | Status: SHIPPED | OUTPATIENT
Start: 2025-02-26 | End: 2025-02-28 | Stop reason: SDUPTHER

## 2025-02-26 NOTE — ASSESSMENT & PLAN NOTE
Home blood pressures have showed some elevated readings.  Increase hydrochlorothiazide to 25 mg daily.  Orders:    hydroCHLOROthiazide 25 mg tablet; Take 1 tablet (25 mg total) by mouth daily    Basic metabolic panel; Future

## 2025-02-26 NOTE — PROGRESS NOTES
"Name: Murphy Willams      : 1950      MRN: [id number]  Encounter Provider: Jeronimo Ren MD  Encounter Date: 2025   Encounter department: [facility] UROLOGY [place] [facility]  :  Assessment & Plan  Neurogenic bladder         Urinary retention         Impression: History of BPH, urinary retention, status post TURP, history of CLL with recurrence    Plan: I recommend that he continue to catheterize 5-6 times per day with a 14 [place] straight prelubricated catheter.  Follow-up will be in 1 year or sooner if needed.    History of Present Illness   Murphy Willams is a 74 y.o. male who presents in follow-up for his history of urinary retention.  He is performing clean intermittent catheterization multiple times per day.  He has a prior history of BPH and previously underwent TURP x 2, however, he has been unable to urinate.  His most recent PSA level is noted to be 0.207 from the follow-up .  He states that he is catheterizing 5-6 times per day using a 14 [place] straight prelubricated catheter.  He does not have any issues catheterizing.  He states that more recently had recurrence of his CLL and is under treatment by medical oncology.    Review of Systems       Objective   /90 (BP Location: Left arm, Patient Position: Sitting, Cuff Size: Adult)   Pulse 88   Ht 5' 7.99\" (1.727 m)   Wt 91.6 kg (202 lb)   SpO2 98%   BMI 30.72 kg/m²     Physical Exam on examination he is in no acute distress.  Gait normal.  Affect normal    Results   Lab Results   Component Value Date    PSA 0.207 10/14/2024    PSA 0.14 2023    PSA 0.1 2023     Lab Results   Component Value Date    GLUCOSE 130 01/10/2021    CALCIUM 9.4 2025     (L) 2015    K 4.0 2025    CO2 30 2025    CL 97 2025    BUN 10 2025    CREATININE 0.76 2025     Lab Results   Component Value Date    WBC 4.66 2025    HGB 12.4 2025    HCT 36.5 2025    MCV 95 2025 "     02/11/2025       Office Urine Dip  No results found for this or any previous visit (from the past hour).

## 2025-02-26 NOTE — ASSESSMENT & PLAN NOTE
He has a history of mild hyponatremia.  I am bumping up his hydrochlorothiazide today so I would like to repeat a BMP in 1 month  Orders:    Basic metabolic panel; Future

## 2025-02-26 NOTE — ASSESSMENT & PLAN NOTE
Mood is stable.  He had a lot of situational depressed mood and anxiety regarding his health issues over the last several years.  He does continue to take lorazepam just as needed.

## 2025-02-26 NOTE — ASSESSMENT & PLAN NOTE
He is going to follow-up in about 3 months for blood pressure check.  I will check labs after that.

## 2025-02-26 NOTE — ASSESSMENT & PLAN NOTE
Wt Readings from Last 3 Encounters:   02/26/25 91.6 kg (202 lb)   02/26/25 91.6 kg (202 lb)   02/13/25 91.4 kg (201 lb 8 oz)     Weight is up a little bit but he does not really have signs of fluid overload.  He is off furosemide altogether due to polyuria.  I did bump up his hydrochlorothiazide due to some elevated home blood pressure readings.  Check BMP in about a month and then continue with routine labs ordered through oncology.          Orders:    hydroCHLOROthiazide 25 mg tablet; Take 1 tablet (25 mg total) by mouth daily

## 2025-02-26 NOTE — ASSESSMENT & PLAN NOTE
Continue cardiology follow-up.  He is rate controlled today and seems to be in sinus rhythm.  He is clinically quite stable.

## 2025-02-28 DIAGNOSIS — I50.42 CHRONIC COMBINED SYSTOLIC AND DIASTOLIC CONGESTIVE HEART FAILURE (HCC): ICD-10-CM

## 2025-02-28 DIAGNOSIS — I10 ESSENTIAL HYPERTENSION: Chronic | ICD-10-CM

## 2025-02-28 RX ORDER — HYDROCHLOROTHIAZIDE 25 MG/1
25 TABLET ORAL DAILY
Qty: 90 TABLET | Refills: 1 | Status: SHIPPED | OUTPATIENT
Start: 2025-02-28

## 2025-02-28 NOTE — TELEPHONE ENCOUNTER
Reason for call:   [x] Refill   [] Prior Auth  [x] Other: Not a Duplicate - Patient states insurance will only cover 90 tablets not 100 tablets. Please send as originally prescribed     Office:   [x] PCP/Provider - [facility] - Pravin Castaneda Jr., MD   [] Specialty/Provider -     Medication: hydroCHLOROthiazide 25 mg tablet - Pravin Castaneda Jr., MD     Dose/Frequency: Take 1 tablet (25 mg total) by mouth daily     Quantity: 90 tablets    Pharmacy: [address] [phone]    Does the patient have enough for 3 days?   [x] Yes   [] No - Send as HP to POD

## 2025-03-03 ENCOUNTER — TELEPHONE (OUTPATIENT)
Age: 75
End: 2025-03-03

## 2025-03-03 NOTE — TELEPHONE ENCOUNTER
Called [facility] asking to speak with someone who handles cathter supplies for patients.  [facility] will call the office back.

## 2025-03-03 NOTE — TELEPHONE ENCOUNTER
Patient request Last OV notes and a prescription for CIC supplies sent to [facility] pharmacy    14 F straight pre lubricated catheters    Faxed last OV notes to them at [phone]

## 2025-03-04 NOTE — TELEPHONE ENCOUNTER
Called and spoke with the patient  regarding his catheter supplies from [facility].  Patient states the pharmacy can provide him  the catheters he needs for CIC.  Patient will call the office if he has an issues with [facility]  Pharmacy.

## 2025-03-10 NOTE — TELEPHONE ENCOUNTER
Patient called the RX Refill Line. Message is being forwarded to the office.     Patient is requesting - [facility] pharmacy called to f/u on request for Pt's catheter supplies. Tech requested for the order to please be faxed to [phone]. Please review, thank you.

## 2025-03-10 NOTE — TELEPHONE ENCOUNTER
Please see Rx request from [facility] scanned into Media    Please fax [facility] Rx for his CIC supplies at fax #[phone]

## 2025-03-11 ENCOUNTER — TELEPHONE (OUTPATIENT)
Age: 75
End: 2025-03-11

## 2025-03-11 ENCOUNTER — APPOINTMENT (OUTPATIENT)
Dept: LAB | Facility: MEDICAL CENTER | Age: 75
End: 2025-03-11
Payer: MEDICARE

## 2025-03-11 DIAGNOSIS — C91.10 CLL (CHRONIC LYMPHOCYTIC LEUKEMIA) (HCC): ICD-10-CM

## 2025-03-11 DIAGNOSIS — C91.10 CLL (CHRONIC LYMPHOCYTIC LEUKEMIA) (HCC): Primary | ICD-10-CM

## 2025-03-11 DIAGNOSIS — E87.1 HYPONATREMIA: ICD-10-CM

## 2025-03-11 DIAGNOSIS — I10 ESSENTIAL HYPERTENSION: Chronic | ICD-10-CM

## 2025-03-11 LAB
ALBUMIN SERPL BCG-MCNC: 5 G/DL (ref 3.5–5)
ALP SERPL-CCNC: 129 U/L (ref 34–104)
ALT SERPL W P-5'-P-CCNC: 48 U/L (ref 7–52)
ANION GAP SERPL CALCULATED.3IONS-SCNC: 7 MMOL/L (ref 4–13)
ANISOCYTOSIS BLD QL SMEAR: PRESENT
AST SERPL W P-5'-P-CCNC: 41 U/L (ref 13–39)
BASOPHILS # BLD MANUAL: 0 THOUSAND/UL (ref 0–0.1)
BASOPHILS NFR MAR MANUAL: 0 % (ref 0–1)
BILIRUB SERPL-MCNC: 0.88 MG/DL (ref 0.2–1)
BUN SERPL-MCNC: 13 MG/DL (ref 5–25)
CALCIUM SERPL-MCNC: 9.6 MG/DL (ref 8.4–10.2)
CHLORIDE SERPL-SCNC: 95 MMOL/L (ref 96–108)
CO2 SERPL-SCNC: 33 MMOL/L (ref 21–32)
CREAT SERPL-MCNC: 0.77 MG/DL (ref 0.6–1.3)
EOSINOPHIL # BLD MANUAL: 0 THOUSAND/UL (ref 0–0.4)
EOSINOPHIL NFR BLD MANUAL: 0 % (ref 0–6)
ERYTHROCYTE [DISTWIDTH] IN BLOOD BY AUTOMATED COUNT: 16.1 % (ref 11.6–15.1)
GFR SERPL CREATININE-BSD FRML MDRD: 90 ML/MIN/1.73SQ M
GIANT PLATELETS BLD QL SMEAR: PRESENT
GLUCOSE P FAST SERPL-MCNC: 117 MG/DL (ref 65–99)
HCT VFR BLD AUTO: 40 % (ref 36.5–49.3)
HGB BLD-MCNC: 13.4 G/DL (ref 12–17)
LDH SERPL-CCNC: 246 U/L (ref 140–271)
LYMPHOCYTES # BLD AUTO: 0.65 THOUSAND/UL (ref 0.6–4.47)
LYMPHOCYTES # BLD AUTO: 8 % (ref 14–44)
MACROCYTES BLD QL AUTO: PRESENT
MCH RBC QN AUTO: 32.9 PG (ref 26.8–34.3)
MCHC RBC AUTO-ENTMCNC: 33.5 G/DL (ref 31.4–37.4)
MCV RBC AUTO: 98 FL (ref 82–98)
METAMYELOCYTE ABSOLUTE CT: 0.06 THOUSAND/UL (ref 0–0.1)
METAMYELOCYTES NFR BLD MANUAL: 1 % (ref 0–1)
MONOCYTES # BLD AUTO: 0.47 THOUSAND/UL (ref 0–1.22)
MONOCYTES NFR BLD: 8 % (ref 4–12)
MYELOCYTE ABSOLUTE CT: 0.06 THOUSAND/UL (ref 0–0.1)
MYELOCYTES NFR BLD MANUAL: 1 % (ref 0–1)
NEUTROPHILS # BLD MANUAL: 4.67 THOUSAND/UL (ref 1.85–7.62)
NEUTS BAND NFR BLD MANUAL: 8 % (ref 0–8)
NEUTS SEG NFR BLD AUTO: 71 % (ref 43–75)
OVALOCYTES BLD QL SMEAR: PRESENT
PLATELET # BLD AUTO: 220 THOUSANDS/UL (ref 149–390)
PLATELET BLD QL SMEAR: ADEQUATE
PMV BLD AUTO: 10 FL (ref 8.9–12.7)
POTASSIUM SERPL-SCNC: 4.2 MMOL/L (ref 3.5–5.3)
PROT SERPL-MCNC: 7.5 G/DL (ref 6.4–8.4)
RBC # BLD AUTO: 4.07 MILLION/UL (ref 3.88–5.62)
RBC MORPH BLD: PRESENT
SODIUM SERPL-SCNC: 135 MMOL/L (ref 135–147)
URATE SERPL-MCNC: 5 MG/DL (ref 3.5–8.5)
VARIANT LYMPHS # BLD AUTO: 3 %
WBC # BLD AUTO: 5.91 THOUSAND/UL (ref 4.31–10.16)

## 2025-03-11 PROCEDURE — 85007 BL SMEAR W/DIFF WBC COUNT: CPT

## 2025-03-11 PROCEDURE — 84550 ASSAY OF BLOOD/URIC ACID: CPT

## 2025-03-11 PROCEDURE — 85027 COMPLETE CBC AUTOMATED: CPT

## 2025-03-11 PROCEDURE — 83615 LACTATE (LD) (LDH) ENZYME: CPT

## 2025-03-11 PROCEDURE — 80053 COMPREHEN METABOLIC PANEL: CPT

## 2025-03-11 PROCEDURE — 36415 COLL VENOUS BLD VENIPUNCTURE: CPT

## 2025-03-11 NOTE — TELEPHONE ENCOUNTER
Patient called stating he typically goes for his labs every 4 weeks and then every 8 weeks he has his IgG drawn. Today when he went there were not orders for IgG but he had the other labs drawn. He would like to know if new orders can be placed for IgG and if he can wait for his next lab draw in 4 weeks to have the IgG drawn together? Otherwise, he will go have the IgG drawn if Dr. Hanley doesn't want him to wait that long. He is also inquiring if it is okay for him to have labs a few days sooner than the 4 week christofer on 4/4 so that his results are back for his 4/7 office visit with Dr. Hanley.

## 2025-03-11 NOTE — TELEPHONE ENCOUNTER
"Rx for patient's intermittent straight catheters 14fr 14\" pre lubricated  540 supply  4 refills  Fax to [phone]. Confirmation received and scanned into the patient's charts.  "

## 2025-03-20 ENCOUNTER — REMOTE DEVICE CLINIC VISIT (OUTPATIENT)
Dept: CARDIOLOGY CLINIC | Facility: CLINIC | Age: 75
End: 2025-03-20
Payer: MEDICARE

## 2025-03-20 DIAGNOSIS — Z95.810 PRESENCE OF AUTOMATIC CARDIOVERTER/DEFIBRILLATOR (AICD): Primary | ICD-10-CM

## 2025-03-20 PROCEDURE — 93296 REM INTERROG EVL PM/IDS: CPT | Performed by: INTERNAL MEDICINE

## 2025-03-20 PROCEDURE — 93295 DEV INTERROG REMOTE 1/2/MLT: CPT | Performed by: INTERNAL MEDICINE

## 2025-03-20 NOTE — PROGRESS NOTES
Results for orders placed or performed in visit on 03/20/25   Cardiac EP device report    Narrative    MDT BI-V ICD (DDIR) ACTIVE SYSTEM IS MRI CONDITIONAL  CARELINK TRANSMISSION: BATTERY VOLTAGE ADEQUATE (5.3 YR). AP 99.6%  99.9% + VSR PACE <0.1%. ALL AVAILABLE LEAD PARAMETERS WITHIN NORMAL LIMITS. NO SIGNIFICANT HIGH RATE EPISODES. 15 V SENSE EPISODES (MARKERS) -FUSION, PVC'S, SLOW VT. EF 55% (9/3/2024 ECHO). PATIENT TAKING PRADAXA, NADOLOL. OPTI-VOL WITHIN NORMAL LIMITS. NORMAL DEVICE FUNCTION.   ES

## 2025-03-21 ENCOUNTER — OFFICE VISIT (OUTPATIENT)
Dept: CARDIOLOGY CLINIC | Facility: CLINIC | Age: 75
End: 2025-03-21
Payer: MEDICARE

## 2025-03-21 VITALS
DIASTOLIC BLOOD PRESSURE: 78 MMHG | WEIGHT: 199 LBS | SYSTOLIC BLOOD PRESSURE: 120 MMHG | HEART RATE: 94 BPM | HEIGHT: 68 IN | BODY MASS INDEX: 30.16 KG/M2

## 2025-03-21 DIAGNOSIS — I50.42 CHRONIC COMBINED SYSTOLIC AND DIASTOLIC CONGESTIVE HEART FAILURE (HCC): ICD-10-CM

## 2025-03-21 DIAGNOSIS — I10 ESSENTIAL HYPERTENSION: Chronic | ICD-10-CM

## 2025-03-21 DIAGNOSIS — I42.8 NICM (NONISCHEMIC CARDIOMYOPATHY) (HCC): Primary | ICD-10-CM

## 2025-03-21 PROCEDURE — 99214 OFFICE O/P EST MOD 30 MIN: CPT | Performed by: INTERNAL MEDICINE

## 2025-03-21 NOTE — PROGRESS NOTES
Cardiology             Murphy Lunas  1950  [id number]                 Assessment/Plan     Long QT syndrome 2, now on nadolol, status post dual-chamber/biventricular AICD placed 3/8/2023  Paroxysmal atrial fibrillation/flutter, occurring immediately post torsades in 2023  Paroxysmal SVT, status post adenosine 3/6/2023  Torsade de pointes and V-fib episode 3/6/2023  Nonischemic cardiomyopathy, ejection fraction 30%.  Normal coronary angiography 3/7/2023  CLL  History of heavy alcohol use  Dyslipidemia  Severe tricuspid regurgitation           I had not planned discussion with Dr. Charles and Dr. Olsen and it was our feeling that the patient would be low risk to discontinue Pradaxa, given that he has had no recurrence of atrial fibrillation since device implantation in 2023, and that his atrial fibrillation episode occurred immediately after torsades in 2023.  I reviewed the option of discontinue Pradaxa with the patient and he is in agreement.  He understands that there may be a small risk of recurrent atrial fibrillation/flutter and his risk of thromboembolic complications may be slightly increased, although he is acceptable of this.  Will continue device interrogations and restart Pradaxa if there is any recurrent atrial fibrillation or flutter on device monitoring.  Continue nadolol, continue following up with electrophysiology for history of torsades and long QT syndrome to  Continue atorvastatin for dyslipidemia  Blood pressure log reviewed from home, averaging in the 130s, continue HCTZ.  Patient recently started going to the gym with a .  I have recommended heartedly diet as well and some weight loss which will help with his blood pressure  Patient aware of recent echocardiogram findings from 9/3/2024 including severe tricuspid regurgitation.  Estimated pulmonary systolic pressure was severely elevated as well.  He will continue following with pulmonary medicine.  No signs or symptoms of  volume overload or CHF on examination.  He continues on HCTZ.  May consider changing furosemide at some point in the future if any lower extremity edema occurs.  Patient aware to look for symptoms of lower extremity edema and volume overload.            Follow-up in 6 months          Interval History:      Murphy Willams is a 74 y.o. male with hypertension who has to be on minoxidil in the past.  He also has a history of prolonged QT interval, and has been seeing Dr. Garrison in the past.   He has also been following Dr. Lore robles pulmonary medicine for pulmonary hypertension and sleep apnea.     He was hospitalized with severe COVID-19 pneumonia 01/2021 at which time our group was consulted due to bradycardia and short runs of nonsustained ventricular tachycardia.  He was maintained on metoprolol and QT prolonging agents were avoided.       He presents to the hospital 3/6/2023 after an unresponsive episode at home.  EMS apparently noticed ventricular fibrillation and gave him 1 shock and started CPR.  He was not intubated and presented to the ER lethargic and confused.  He was in a narrow complex regular tachycardia, likely SVT.  He was given adenosine with resolution of his arrhythmia, back into sinus rhythm.  QT interval was initially within normal limits, but subsequently started becoming progressively more prolonged.  In the morning of 3/7/2023 he had torsades with resultant ventricular fibrillation.  He spontaneously converted back to sinus rhythm and was then started on a lidocaine infusion.  He underwent coronary angiography 3/7/2023 revealing no obstructive CAD.  A temporary pacing wire was placed for recurrent torsades.    He was transferred to [facility] and was thought to have LQT2, with possible torsades precipitated at home due to SSRI and antibiotic use.  He was initiated on nadolol.      He was noted to have paroxysmal atrial fibrillation/flutter and was placed on Xarelto anticoagulation.      Echocardiogram 3/9/2023 revealed left ventricle ejection fraction 30%.  This was thought to be a nonischemic cardiomyopathy possibly due to stress.    On 3/8/2023, he underwent placement of a [employer] dual-chamber AICD with an RA lead, RV lead, and LV/his lead.  He was evaluated by the heart failure service and initiated on losartan and spironolactone.  He was maintained on nadolol with plans to repeat echocardiogram in 3 months.    Repeat echocardiogram 8/21/2023 demonstrate left ejection fraction 45% with mild biatrial dilatation and mild aortic regurgitation.    He was hospitalized 9/2024 with significant cough with CT chest demonstrating right middle lobe pneumonia.  Cardiac CTA ruled out left atrial appendage thrombus which was suspected on prior CT.  He was significantly anemic thought to be due to progressive CLL.  A bone marrow biopsy was performed and he was started on immunotherapy with Engasser and Decadron.  On the day of discharge his hemoglobin was 6.6 and he received 1 unit of blood with instructions to follow-up with hematology after discharge.    Echocardiogram on 9/3/2024 demonstrated left ejection fraction 55% with mild right ventricular dilatation, severe left atrial dilatation and severe tricuspid regurgitation.    I received a message from Dr. Charles after the patient was evaluated by him for Watchman device placement.  He was recommended to consider discontinuation of Pradaxa, given his atrial fibrillation occurred immediately after his torsades.  Device interrogations have not revealed any recurrent atrial fibrillation since implantation in 2023.  Watchman device placement was not recommended.    He presents today for cardiology reevaluation.  Device interrogation yesterday showed 99.6% atrial pacing with 99.9% ventricular pacing and no significant high rate episodes.  OptiVol was within normal limits.  From a symptomatic standpoint he feels well without exertional chest pain, shortness  "of breath, dizziness, palpitations, lower extremity edema.            Vitals:  Vitals:    03/21/25 0908   BP: 120/78   BP Location: Right arm   Patient Position: Sitting   Cuff Size: Large   Pulse: 94   Weight: 90.3 kg (199 lb)   Height: 5' 7.99\" (1.727 m)         Past Medical History:   Diagnosis Date   • Alcoholic cirrhosis of liver without ascites (HCC) 10/29/2024   • Anxiety    • BPH (benign prostatic hypertrophy)    • Cancer (HCC)     CLL   • CLL (chronic lymphocytic leukemia) (HCC)     2009   • Colon polyp    • Concussion     Resolved: 08/22/16   • COVID-19 12/29/2023   • Depression    • Diverticulitis 01/13/2020 2014 CT done 11/19 12/19 CT done    • E coli bacteremia 06/27/2021 2/2 blood cultures with Ecoli  Source appears to be the urine    • Epididymitis 05/19/2021 5/2021   • Gastrointestinal hemorrhage     Last assessed: 08/27/13   • GERD (gastroesophageal reflux disease)    • H/O vitamin D deficiency 07/07/2021   • Hearing loss of aging    • Hiatal hernia    • History of right hip replacement 04/19/2021   • History of transfusion    • Hyperlipidemia    • Hypertension    • Hyponatremia 03/06/2023   • Iron deficiency anemia    • Livedo reticularis 09/02/2024   • Microscopic hematuria     Last assessed: 06/28/13   • OA (osteoarthritis)     right hip   • Obesity (BMI 30.0-34.9) 04/07/2022   • Pneumothorax    • Primary osteoarthritis of right hip 09/29/2017    He is status post right hip arthroplasty   • Prostatitis    • Pulmonary hypertension (HCC)    • QT prolongation    • Seasonal allergies    • Sepsis (HCC) 03/06/2023   • Unresponsive episode 03/07/2023   • Urinary tract infection associated with catheterization of urinary tract  (HCC) 02/05/2021    Pseudomonal UTI asymptomatic.  Per Urology do not treat at this time.   • Urinary tract infection associated with catheterization of urinary tract  (HCC) 02/05/2021    Pseudomonal UTI asymptomatic.  Per Urology do not treat at this time.  He did have " "urosepsis from E coli    • UTI (urinary tract infection)     in past   • Ventricular fibrillation (HCC) 2023    Noted after unresponsive episode     • Wears glasses      Social History     Socioeconomic History   • Marital status: /Civil Union     Spouse name: Not on file   • Number of children: Not on file   • Years of education: Not on file   • Highest education level: Not on file   Occupational History   • Not on file   Tobacco Use   • Smoking status: Former     Current packs/day: 0.00     Types: Cigarettes     Quit date: 1980     Years since quittin.5     Passive exposure: Past   • Smokeless tobacco: Never   Vaping Use   • Vaping status: Never Used   Substance and Sexual Activity   • Alcohol use: Yes     Alcohol/week: 2.0 - 4.0 standard drinks of alcohol     Types: 2 - 4 Cans of beer per week     Comment: rare   • Drug use: Not Currently     Types: Marijuana     Comment: \"medical marijuana\"   • Sexual activity: Not Currently   Other Topics Concern   • Not on file   Social History Narrative   • Not on file     Social Drivers of Health     Financial Resource Strain: Low Risk  (10/23/2023)    Overall Financial Resource Strain (CARDIA)    • Difficulty of Paying Living Expenses: Not hard at all   Food Insecurity: No Food Insecurity (2024)    Hunger Vital Sign    • Worried About Running Out of Food in the Last Year: Never true    • Ran Out of Food in the Last Year: Never true   Transportation Needs: No Transportation Needs (2024)    PRAPARE - Transportation    • Lack of Transportation (Medical): No    • Lack of Transportation (Non-Medical): No   Physical Activity: Not on file   Stress: Not on file   Social Connections: Not on file   Intimate Partner Violence: Not on file   Housing Stability: Low Risk  (2024)    Housing Stability Vital Sign    • Unable to Pay for Housing in the Last Year: No    • Number of Times Moved in the Last Year: 1    • Homeless in the Last Year: No    "   Family History   Problem Relation Age of Onset   • No Known Problems Mother    • Heart attack Father    • Diabetes Brother    • Prostate cancer Brother      Past Surgical History:   Procedure Laterality Date   • ADENOIDECTOMY     • BLADDER SURGERY     • CARDIAC CATHETERIZATION Left 03/07/2023    Procedure: Cardiac Left Heart Cath;  Surgeon: Rich Ramirez MD;  Location: [facility];  Service: Cardiology   • CARDIAC CATHETERIZATION N/A 03/07/2023    Procedure: Cardiac temporary pacemaker;  Surgeon: Rich Ramirez MD;  Location: [facility];  Service: Cardiology   • CARDIAC ELECTROPHYSIOLOGY PROCEDURE N/A 03/08/2023    Procedure: Cardiac icd implant;  Surgeon: Filiberto Olsen MD;  Location: [facility];  Service: Cardiology   • COLONOSCOPY     • CYSTOSCOPY  10/09/2014    Diagnostic   • CYSTOSCOPY  06/29/2020   • FL CYSTOGRAM  08/15/2022   • FRACTURE SURGERY      left lower arm   • FRACTURE SURGERY      left femur   • HERNIA REPAIR     • IR BIOPSY BONE MARROW  9/9/2024   • JOINT REPLACEMENT Right     hip   • OTHER SURGICAL HISTORY  03/2011    Spinal anesthesia epidural   • AK ARTHRP ACETBLR/PROX FEM PROSTC AGRFT/ALGRFT Right 09/29/2017    Procedure: ARTHROPLASTY HIP TOTAL ANTERIOR;  Surgeon: Roly Liu MD;  Location: [facility];  Service: Orthopedics   • TONSILLECTOMY AND ADENOIDECTOMY     • TRANSURETHRAL RESECTION OF PROSTATE      x 2   • WRIST SURGERY         Current Outpatient Medications:   •  acetaminophen (TYLENOL) 325 mg tablet, Take 2 tablets (650 mg total) by mouth every 6 (six) hours as needed for mild pain, Disp: , Rfl:   •  allopurinol (ZYLOPRIM) 300 mg tablet, Take 1 tablet (300 mg total) by mouth daily, Disp: 90 tablet, Rfl: 1  •  ascorbic acid (VITAMIN C) 250 MG CHEW, Chew 250 mg daily, Disp: , Rfl:   •  atorvastatin (LIPITOR) 20 mg tablet, Take 1 tablet (20 mg total) by mouth daily, Disp: 100 tablet, Rfl: 3  •  Blood Pressure Monitoring (Omron 5 Series BP Monitor) PEE, , Disp: ,  Rfl:   •  hydroCHLOROthiazide 25 mg tablet, Take 1 tablet (25 mg total) by mouth daily, Disp: 90 tablet, Rfl: 1  •  Lactobacillus (PROBIOTIC ACIDOPHILUS PO), Take by mouth, Disp: , Rfl:   •  LORazepam (ATIVAN) 0.5 mg tablet, TAKE ONE TABLET BY MOUTH DAILY AS NEEDED for anxiety, Disp: 60 tablet, Rfl: 1  •  montelukast (SINGULAIR) 10 mg tablet, Take 1 tablet (10 mg total) by mouth daily, Disp: 90 tablet, Rfl: 3  •  nadolol (CORGARD) 80 MG tablet, Take 1 tablet (80 mg total) by mouth daily in the early morning, Disp: 90 tablet, Rfl: 3  •  Omega-3 Fatty Acids (FISH OIL) 1,000 mg, Take by mouth daily, Disp: , Rfl:   •  omeprazole (PriLOSEC) 40 MG capsule, Take one capsule by mouth once every day., Disp: 100 capsule, Rfl: 3  •  Venetoclax 100 MG TABS, Take 2 tablets (200 mg total) by mouth daily, Disp: 60 tablet, Rfl: 5  •  vitamin B-12 (CYANOCOBALAMIN) 500 MCG TABS, Take 1 tablet (500 mcg total) by mouth daily, Disp: 30 tablet, Rfl: 0  •  zolpidem (AMBIEN) 5 mg tablet, Take 1/2-1 full tablet at bedtime as needed for sleep.  Do not take if drinking any alcohol or taking lorazepam, Disp: 90 tablet, Rfl: 0  •  ipratropium (ATROVENT) 0.03 % nasal spray, 2 sprays into each nostril 2 (two) times a day (Patient not taking: Reported on 1/16/2025), Disp: 30 mL, Rfl: 3  •  predniSONE 10 mg tablet, Take 4 tablets (40 mg total) by mouth daily for 1 day Day before each Gazyva treatment. Take it with food., Disp: 4 tablet, Rfl: 0        Review of Systems:  Review of Systems   Respiratory: Negative.  Negative for chest tightness and shortness of breath.    Cardiovascular: Negative.  Negative for chest pain.   All other systems reviewed and are negative.        Physical Exam:  Physical Exam  Constitutional:       General: He is not in acute distress.     Appearance: He is well-developed. He is not diaphoretic.   HENT:      Head: Normocephalic and atraumatic.   Eyes:      General: No scleral icterus.        Right eye: No discharge.       Pupils: Pupils are equal, round, and reactive to light.   Neck:      Thyroid: No thyromegaly.   Cardiovascular:      Rate and Rhythm: Normal rate and regular rhythm.      Heart sounds: Normal heart sounds. No murmur heard.     No friction rub. No gallop.   Pulmonary:      Effort: Pulmonary effort is normal.      Breath sounds: Normal breath sounds. No wheezing.   Abdominal:      General: There is no distension.      Tenderness: There is no abdominal tenderness. There is no guarding or rebound.   Musculoskeletal:         General: Normal range of motion.      Cervical back: Normal range of motion and neck supple.      Right lower leg: No edema.      Left lower leg: No edema.   Skin:     General: Skin is warm and dry.      Coloration: Skin is not pale.      Findings: No erythema or rash.   Neurological:      Mental Status: He is alert and oriented to person, place, and time.      Coordination: Coordination normal.   Psychiatric:         Behavior: Behavior normal.         Thought Content: Thought content normal.         Judgment: Judgment normal.         This note was completed in part utilizing M-Modal Fluency Direct Software.  Grammatical errors, random word insertions, spelling mistakes, and incomplete sentences can be an occasional consequence of this system secondary to software limitations, ambient noise, and hardware issues.  If you have any questions or concerns about the content, text, or information contained within the body of this dictation, please contact the provider for clarification.

## 2025-03-22 ENCOUNTER — RESULTS FOLLOW-UP (OUTPATIENT)
Dept: NON INVASIVE DIAGNOSTICS | Facility: HOSPITAL | Age: 75
End: 2025-03-22

## 2025-04-01 DIAGNOSIS — C91.10 CLL (CHRONIC LYMPHOCYTIC LEUKEMIA) (HCC): ICD-10-CM

## 2025-04-01 DIAGNOSIS — I50.9 NEW ONSET OF CONGESTIVE HEART FAILURE (HCC): ICD-10-CM

## 2025-04-01 RX ORDER — ALLOPURINOL 300 MG/1
300 TABLET ORAL DAILY
Qty: 90 TABLET | Refills: 1 | Status: SHIPPED | OUTPATIENT
Start: 2025-04-01

## 2025-04-01 NOTE — TELEPHONE ENCOUNTER
Reason for call:   [x] Refill   [] Prior Auth  [] Other:     Office:   [x] PCP/Provider -  Pravin Castaneda  / sally dominguez   [] Specialty/Provider -     Medication: allopurinol (ZYLOPRIM) 300 mg tablet     Dose/Frequency: Take 1 tablet (300 mg total) by mouth daily,     Quantity: 90    Pharmacy: [address], [place] [facility]   Does the patient have enough for 3 days?   [] Yes   [x] No - Send as HP to POD

## 2025-04-01 NOTE — TELEPHONE ENCOUNTER
Reason for call:   [x] Refill   [] Prior Auth  [] Other:     Office:   [] PCP/Provider -   [x] Specialty/Provider - Cardio    Medication: nadolol (CORGARD) 80 MG     Dose/Frequency: Take 1 tablet (80 mg total) by mouth daily in the early morning     Quantity: 90    Ph[address]   [address]   Does the patient have enough for 3 days?   [x] Yes   [] No - Send as HP to POD    Mail Away Pharmacy   Does the patient have enough for 10 days?   [] Yes   [] No - Send as HP to POD

## 2025-04-02 RX ORDER — NADOLOL 80 MG/1
80 TABLET ORAL
Qty: 90 TABLET | Refills: 1 | Status: SHIPPED | OUTPATIENT
Start: 2025-04-02

## 2025-04-04 ENCOUNTER — APPOINTMENT (OUTPATIENT)
Dept: LAB | Facility: HOSPITAL | Age: 75
End: 2025-04-04
Payer: MEDICARE

## 2025-04-04 DIAGNOSIS — C91.10 CLL (CHRONIC LYMPHOCYTIC LEUKEMIA) (HCC): ICD-10-CM

## 2025-04-04 LAB
ALBUMIN SERPL BCG-MCNC: 4.7 G/DL (ref 3.5–5)
ALP SERPL-CCNC: 110 U/L (ref 34–104)
ALT SERPL W P-5'-P-CCNC: 52 U/L (ref 7–52)
ANION GAP SERPL CALCULATED.3IONS-SCNC: 9 MMOL/L (ref 4–13)
AST SERPL W P-5'-P-CCNC: 40 U/L (ref 13–39)
BASOPHILS # BLD AUTO: 0.01 THOUSANDS/ÂΜL (ref 0–0.1)
BASOPHILS NFR BLD AUTO: 0 % (ref 0–1)
BILIRUB SERPL-MCNC: 1.12 MG/DL (ref 0.2–1)
BUN SERPL-MCNC: 16 MG/DL (ref 5–25)
CALCIUM SERPL-MCNC: 9.3 MG/DL (ref 8.4–10.2)
CHLORIDE SERPL-SCNC: 97 MMOL/L (ref 96–108)
CO2 SERPL-SCNC: 29 MMOL/L (ref 21–32)
CREAT SERPL-MCNC: 0.8 MG/DL (ref 0.6–1.3)
EOSINOPHIL # BLD AUTO: 0 THOUSAND/ÂΜL (ref 0–0.61)
EOSINOPHIL NFR BLD AUTO: 0 % (ref 0–6)
ERYTHROCYTE [DISTWIDTH] IN BLOOD BY AUTOMATED COUNT: 15.4 % (ref 11.6–15.1)
GFR SERPL CREATININE-BSD FRML MDRD: 89 ML/MIN/1.73SQ M
GLUCOSE P FAST SERPL-MCNC: 113 MG/DL (ref 65–99)
HCT VFR BLD AUTO: 35.6 % (ref 36.5–49.3)
HGB BLD-MCNC: 12.2 G/DL (ref 12–17)
IGG SERPL-MCNC: 766 MG/DL (ref 635–1741)
IMM GRANULOCYTES # BLD AUTO: 0.06 THOUSAND/UL (ref 0–0.2)
IMM GRANULOCYTES NFR BLD AUTO: 2 % (ref 0–2)
LDH SERPL-CCNC: 198 U/L (ref 140–271)
LYMPHOCYTES # BLD AUTO: 0.57 THOUSANDS/ÂΜL (ref 0.6–4.47)
LYMPHOCYTES NFR BLD AUTO: 14 % (ref 14–44)
MCH RBC QN AUTO: 32.8 PG (ref 26.8–34.3)
MCHC RBC AUTO-ENTMCNC: 34.3 G/DL (ref 31.4–37.4)
MCV RBC AUTO: 96 FL (ref 82–98)
MONOCYTES # BLD AUTO: 0.74 THOUSAND/ÂΜL (ref 0.17–1.22)
MONOCYTES NFR BLD AUTO: 18 % (ref 4–12)
NEUTROPHILS # BLD AUTO: 2.7 THOUSANDS/ÂΜL (ref 1.85–7.62)
NEUTS SEG NFR BLD AUTO: 66 % (ref 43–75)
NRBC BLD AUTO-RTO: 0 /100 WBCS
PLATELET # BLD AUTO: 171 THOUSANDS/UL (ref 149–390)
PMV BLD AUTO: 9.4 FL (ref 8.9–12.7)
POTASSIUM SERPL-SCNC: 4 MMOL/L (ref 3.5–5.3)
PROT SERPL-MCNC: 7.3 G/DL (ref 6.4–8.4)
RBC # BLD AUTO: 3.72 MILLION/UL (ref 3.88–5.62)
SODIUM SERPL-SCNC: 135 MMOL/L (ref 135–147)
URATE SERPL-MCNC: 5.3 MG/DL (ref 3.5–8.5)
WBC # BLD AUTO: 4.08 THOUSAND/UL (ref 4.31–10.16)

## 2025-04-04 PROCEDURE — 83615 LACTATE (LD) (LDH) ENZYME: CPT

## 2025-04-04 PROCEDURE — 36415 COLL VENOUS BLD VENIPUNCTURE: CPT

## 2025-04-04 PROCEDURE — 80053 COMPREHEN METABOLIC PANEL: CPT

## 2025-04-04 PROCEDURE — 85025 COMPLETE CBC W/AUTO DIFF WBC: CPT

## 2025-04-04 PROCEDURE — 84550 ASSAY OF BLOOD/URIC ACID: CPT

## 2025-04-04 PROCEDURE — 82784 ASSAY IGA/IGD/IGG/IGM EACH: CPT

## 2025-04-07 ENCOUNTER — TELEPHONE (OUTPATIENT)
Age: 75
End: 2025-04-07

## 2025-04-07 ENCOUNTER — OFFICE VISIT (OUTPATIENT)
Dept: HEMATOLOGY ONCOLOGY | Facility: CLINIC | Age: 75
End: 2025-04-07
Payer: MEDICARE

## 2025-04-07 VITALS
HEIGHT: 68 IN | RESPIRATION RATE: 18 BRPM | WEIGHT: 201 LBS | HEART RATE: 93 BPM | DIASTOLIC BLOOD PRESSURE: 74 MMHG | SYSTOLIC BLOOD PRESSURE: 128 MMHG | BODY MASS INDEX: 30.46 KG/M2 | TEMPERATURE: 97.9 F | OXYGEN SATURATION: 97 %

## 2025-04-07 DIAGNOSIS — I50.42 CHRONIC COMBINED SYSTOLIC AND DIASTOLIC CONGESTIVE HEART FAILURE (HCC): ICD-10-CM

## 2025-04-07 DIAGNOSIS — C91.10 CLL (CHRONIC LYMPHOCYTIC LEUKEMIA) (HCC): Primary | ICD-10-CM

## 2025-04-07 DIAGNOSIS — R16.2 HEPATOSPLENOMEGALY: ICD-10-CM

## 2025-04-07 DIAGNOSIS — I48.0 PAROXYSMAL A-FIB (HCC): ICD-10-CM

## 2025-04-07 DIAGNOSIS — D59.10 AUTOIMMUNE HEMOLYTIC ANEMIA (HCC): ICD-10-CM

## 2025-04-07 DIAGNOSIS — Z95.0 PACEMAKER: ICD-10-CM

## 2025-04-07 DIAGNOSIS — I27.20 PULMONARY HYPERTENSION (HCC): ICD-10-CM

## 2025-04-07 DIAGNOSIS — M19.90 ARTHRITIS: ICD-10-CM

## 2025-04-07 DIAGNOSIS — K70.30 ALCOHOLIC CIRRHOSIS OF LIVER WITHOUT ASCITES (HCC): ICD-10-CM

## 2025-04-07 DIAGNOSIS — E80.6 HYPERBILIRUBINEMIA: ICD-10-CM

## 2025-04-07 PROCEDURE — G2211 COMPLEX E/M VISIT ADD ON: HCPCS | Performed by: INTERNAL MEDICINE

## 2025-04-07 PROCEDURE — 99214 OFFICE O/P EST MOD 30 MIN: CPT | Performed by: INTERNAL MEDICINE

## 2025-04-07 NOTE — ASSESSMENT & PLAN NOTE
CLL appears to be remission and he is being continued on venetoclax.  Orders:    CBC and differential; Standing    Comprehensive metabolic panel; Standing    LD,Blood; Standing    IgG; Standing

## 2025-04-07 NOTE — TELEPHONE ENCOUNTER
Call received by Murphy.     Patient seen  this morning and has a few questions on medication and labs.     Per patient when he seen his AVS he noticed uric acid was not ordered by provider and normally he gets this checked due to being on Allopurinol.     Patient will like a call back from nurse to discuss concerns.     Tried to transfer to [facility] but all were assisting patients.     Patient will like to be reached at [phone].       Thanks!

## 2025-04-07 NOTE — ASSESSMENT & PLAN NOTE
Wt Readings from Last 3 Encounters:   04/07/25 91.2 kg (201 lb)   03/21/25 90.3 kg (199 lb)   02/26/25 91.6 kg (202 lb)   He follows with his cardiologist and PCP

## 2025-04-07 NOTE — TELEPHONE ENCOUNTER
Returned phone call to patient to relay message. He verbalized understanding and has no additional questions or concerns at this moment.

## 2025-04-07 NOTE — ASSESSMENT & PLAN NOTE
.  History of cirrhosis of liver secondary to alcoholism.  He states he has stopped drinking alcohol.

## 2025-04-07 NOTE — PATIENT INSTRUCTIONS
.  Please stop allopurinol.  Ordered blood work every 4 weeks except IgG every 8 weeks.  Follow-up in 2 months in [place].  No change in venetoclax

## 2025-04-07 NOTE — PROGRESS NOTES
Name: Murphy Willams      : 1950      MRN: [id number]  Encounter Provider: David Hanley MD  Encounter Date: 2025   Encounter department: [facility] [place]  :  Assessment & Plan  CLL (chronic lymphocytic leukemia) (HCC)  CLL appears to be remission and he is being continued on venetoclax.  Orders:    CBC and differential; Standing    Comprehensive metabolic panel; Standing    LD,Blood; Standing    IgG; Standing    Autoimmune hemolytic anemia (HCC)  No evidence of hemolysis at present       Paroxysmal A-fib (HCC)  He follows with his cardiologist and PCP       Pulmonary hypertension (HCC)  He follows with his lung specialist.       Hepatosplenomegaly  .  History of cirrhosis of liver secondary to alcoholism.  He states he has stopped drinking alcohol.       Chronic combined systolic and diastolic congestive heart failure (HCC)  Wt Readings from Last 3 Encounters:   25 91.2 kg (201 lb)   25 90.3 kg (199 lb)   25 91.6 kg (202 lb)   He follows with his cardiologist and PCP                 Pacemaker  Follows with cardiologist and PCP       Hyperbilirubinemia  History of cirrhosis of liver.       Arthritis  Minor arthritic symptoms       Alcoholic cirrhosis of liver without ascites (HCC)  Patient states he has stopped drinking alcohol.  Abnormal LFTs and they are being monitored.  He will stop allopurinol.          Please stop allopurinol.  Ordered blood work every 4 weeks except IgG every 8 weeks.  Follow-up in 2 months in [place].  No change in venetoclax.  He had Gazyva between 2024 and 2025.  How long to keep him on venetoclax and usually for 1 year but because he is on 50% of the full dose maybe he should stay longer.  He wants to stay on present dose of venetoclax 200 mg daily and does not want dose to be increased because he is doing well on present dose and appears to be in remission from CLL.  Goal is to keep CLL remission  and prolong survival.  Patient appears to be capable of self-care.  I suggested eating healthy foods, activities as tolerated and approved by PCP and cardiologist.  Patient to avoid falls and trauma.  Suggested health screening tests. Patient to continue to follow-up with primary physician and other consultants.  Provided counseling and support.  I used a dictation device to dictate this note and there could be mistakes in my note and for that patient may contact my office.        History of Present Illness   Chief Complaint   Patient presents with    Follow-up   Patient is here with his wife.  Follow-up visit for CLL.CLL was diagnosed in 2010 and patient had not required therapy for that until now other than an episode of acute autoimmune profound hemolytic anemia in 2021 that was treated with prednisone and folic acid.   In September 2024  he was hospitalized with pneumonia and WBC count and lymphocyte count was more than 200,000 and he had profound anemia and there was question of GI bleeding but that was not proven.  He had received blood transfusions.  History of atrial fibrillation and he has been on Pradaxa.  He was not a good candidate for BTK inhibitor because of atrial fibrillation and Pradaxa.    He was started on Gazyva in September 2024.  Venetoclax was added in November 2024.  Last dose of Gazyva was in February 2025.  He is being continued on venetoclax 200 mg daily.  He has shown very good response and CLL appears to be in remission.  He has other medical problems primarily cardiac and pulmonary and also has history of alcoholic liver cirrhosis.  Chronically prolonged QT interval and he has dual-chamber/biventricular AICD that was placed in March 2023.  History of rash secondary to drug?  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  Patient had iron deficiency previously and had GI evaluation and had intravenous Venofer.  He follows with his gastroenterologist.   He  "has enlarged prostate and nocturia and he follows with his urologist.      He also follows with his lung specialist for exertional dyspnea and COPD.  History of cirrhosis of liver.  He gives history of B12 deficiency and has been taking B12 orally.  He had bone marrow test in the hospital and that showed 50% infiltration with CLL in September 2024.  He has much less weakness and tiredness and exertional dyspnea.  Overall he has been feeling better.  Pertinent Medical History   See details in HPI.  04/07/25:      Review of Systems  Reviewed 12 systems.  Symptoms are as in HPI.  No fevers, chills, bleeding, bone pains, skin rash at present, weight loss, night sweats and no swelling of the ankles and no swollen glands.  No other neurological, cardiac, pulmonary, GI and  symptoms other than listed in HPI.      Objective   /74 (BP Location: Left arm, Patient Position: Sitting, Cuff Size: Adult)   Pulse 93   Temp 97.9 °F (36.6 °C) (Temporal)   Resp 18   Ht 5' 7.99\" (1.727 m)   Wt 91.2 kg (201 lb)   SpO2 97%   BMI 30.57 kg/m²     Physical Exam  Vitals reviewed.   Constitutional:       General: He is not in acute distress.     Appearance: Normal appearance. He is not ill-appearing.   HENT:      Head: Normocephalic and atraumatic.      Mouth/Throat:      Comments: No thrush.  Eyes:      General: No scleral icterus.     Conjunctiva/sclera: Conjunctivae normal.   Cardiovascular:      Rate and Rhythm: Normal rate and regular rhythm.      Heart sounds: Murmur (Systolic murmur) heard.      Comments: Pacemaker  Pulmonary:      Effort: Pulmonary effort is normal. No respiratory distress.      Breath sounds: Normal breath sounds. No rhonchi or rales.   Abdominal:      General: Abdomen is flat. There is no distension.      Palpations: Abdomen is soft. There is no mass.      Tenderness: There is no abdominal tenderness.      Comments: No ascites.    Musculoskeletal:         General: No swelling or tenderness. Normal " range of motion.      Cervical back: Normal range of motion. No rigidity or tenderness.      Right lower leg: No edema.      Left lower leg: No edema.      Comments: No calf tenderness.   Lymphadenopathy:      Cervical: No cervical adenopathy.      Upper Body:      Right upper body: No supraclavicular or axillary adenopathy.      Left upper body: No supraclavicular or axillary adenopathy.   Skin:     General: Skin is warm.      Coloration: Skin is not jaundiced or pale.      Findings: No bruising or rash.      Nails: There is no clubbing.   Neurological:      General: No focal deficit present.      Mental Status: He is alert and oriented to person, place, and time.      Motor: No weakness.      Coordination: Coordination normal.      Gait: Gait normal.   Psychiatric:         Mood and Affect: Mood normal.         Behavior: Behavior normal.         Thought Content: Thought content normal.     ECOG 2    Labs: I have reviewed the following labs:  Results for orders placed or performed in visit on 04/04/25   CBC and differential   Result Value Ref Range    WBC 4.08 (L) 4.31 - 10.16 Thousand/uL    RBC 3.72 (L) 3.88 - 5.62 Million/uL    Hemoglobin 12.2 12.0 - 17.0 g/dL    Hematocrit 35.6 (L) 36.5 - 49.3 %    MCV 96 82 - 98 fL    MCH 32.8 26.8 - 34.3 pg    MCHC 34.3 31.4 - 37.4 g/dL    RDW 15.4 (H) 11.6 - 15.1 %    MPV 9.4 8.9 - 12.7 fL    Platelets 171 149 - 390 Thousands/uL    nRBC 0 /100 WBCs    Segmented % 66 43 - 75 %    Immature Grans % 2 0 - 2 %    Lymphocytes % 14 14 - 44 %    Monocytes % 18 (H) 4 - 12 %    Eosinophils Relative 0 0 - 6 %    Basophils Relative 0 0 - 1 %    Absolute Neutrophils 2.70 1.85 - 7.62 Thousands/µL    Absolute Immature Grans 0.06 0.00 - 0.20 Thousand/uL    Absolute Lymphocytes 0.57 (L) 0.60 - 4.47 Thousands/µL    Absolute Monocytes 0.74 0.17 - 1.22 Thousand/µL    Eosinophils Absolute 0.00 0.00 - 0.61 Thousand/µL    Basophils Absolute 0.01 0.00 - 0.10 Thousands/µL   Comprehensive metabolic  panel   Result Value Ref Range    Sodium 135 135 - 147 mmol/L    Potassium 4.0 3.5 - 5.3 mmol/L    Chloride 97 96 - 108 mmol/L    CO2 29 21 - 32 mmol/L    ANION GAP 9 4 - 13 mmol/L    BUN 16 5 - 25 mg/dL    Creatinine 0.80 0.60 - 1.30 mg/dL    Glucose, Fasting 113 (H) 65 - 99 mg/dL    Calcium 9.3 8.4 - 10.2 mg/dL    AST 40 (H) 13 - 39 U/L    ALT 52 7 - 52 U/L    Alkaline Phosphatase 110 (H) 34 - 104 U/L    Total Protein 7.3 6.4 - 8.4 g/dL    Albumin 4.7 3.5 - 5.0 g/dL    Total Bilirubin 1.12 (H) 0.20 - 1.00 mg/dL    eGFR 89 ml/min/1.73sq m   LD,Blood   Result Value Ref Range     140 - 271 U/L   Result Value Ref Range    Uric Acid 5.3 3.5 - 8.5 mg/dL   Result Value Ref Range     635 - 1,741 mg/dL     *Note: Due to a large number of results and/or encounters for the requested time period, some results have not been displayed. A complete set of results can be found in Results Review.     CT chest abdomen pelvis w contrast  Status: Final result     PACS Images - GE     Show images for CT chest abdomen pelvis w contrast  PACS Images - Sectra     Show images for CT chest abdomen pelvis w contrast    CT chest abdomen pelvis w contrast: Result Notes     Pravin Castaneda Jr., MD  12/5/2024  5:59 PM EST       CT shows pneumonia has resolved - great news.  Abdomen looks OK as well.            Study Result    Narrative & Impression   CT CHEST, ABDOMEN AND PELVIS WITH IV CONTRAST     INDICATION: R10.31: Right lower quadrant pain.     COMPARISON: 9/2/2024 and 9/4/2024        TECHNIQUE: CT examination of the chest, abdomen and pelvis was performed. Multiplanar 2D reformatted images were created from the source data.     This examination, like all CT scans performed in the [facility], was performed utilizing techniques to minimize radiation dose exposure, including the use of iterative reconstruction and automated exposure control. Radiation dose length   product (DLP) for this visit: 1240 mGy-cm      IV Contrast: 100 mL of iohexol (OMNIPAQUE)  Enteric Contrast: Administered.     FINDINGS:     CHEST     LUNGS: Minimal subsegmental atelectasis and possible mild scarring in the dependent portion of the right middle lobe. Interval resolution of airspace disease. No tracheal or endobronchial lesion.     PLEURA: Small amount of loculated fluid in the right major fissure.     HEART/GREAT VESSELS: Stable cardiomegaly. No thoracic aortic aneurysm or dissection.     MEDIASTINUM AND FREDDIE: No lymphadenopathy.     CHEST WALL AND LOWER NECK: Unremarkable.     ABDOMEN     LIVER/BILIARY TREE: Unremarkable.     GALLBLADDER: No calcified gallstones. No pericholecystic inflammatory change.     SPLEEN: Unremarkable.     PANCREAS: Unremarkable.     ADRENAL GLANDS: Unremarkable.     KIDNEYS/URETERS: Symmetric nephrographic phase enhancement of the kidneys. No obstructive uropathy.     STOMACH AND BOWEL: Periumbilical fat-containing 3 cm hernia. Contrast present in the stomach, small bowel and right colon. No evidence of bowel obstruction. Diverticulosis without evidence of diverticulitis or colitis.     APPENDIX: Normal appendix.     ABDOMINOPELVIC CAVITY: No ascites. No pneumoperitoneum. No lymphadenopathy.     VESSELS: No abdominal aortic aneurysm.     PELVIS     REPRODUCTIVE ORGANS: Prior prostatectomy.     URINARY BLADDER: Mild circumferential bladder wall thickening could represent sequela of chronic distal outlet obstruction.     ABDOMINAL WALL/INGUINAL REGIONS: Unremarkable.     BONES: No acute fracture, lytic or blastic lesion.     IMPRESSION:        1. Interval resolution of right middle lobe pneumonia. Residual scarring and atelectasis. Small focus of loculated fluid in the adjacent major fissure.  2. No acute intra-abdominal pelvic process.              Workstation performed: [id number]        Imaging    CT chest abdomen pelvis w contrast (Order: [id number]) - 12/5/2024

## 2025-04-08 ENCOUNTER — TELEPHONE (OUTPATIENT)
Age: 75
End: 2025-04-08

## 2025-04-08 NOTE — TELEPHONE ENCOUNTER
Pt calling in about repeat CT. Informed him order is placed he states he will complete prior to up coming visit. No further questions or concerns

## 2025-04-15 ENCOUNTER — HOSPITAL ENCOUNTER (OUTPATIENT)
Dept: RADIOLOGY | Age: 75
Discharge: HOME | End: 2025-04-15
Payer: MEDICARE

## 2025-04-15 DIAGNOSIS — J18.9 PNEUMONIA OF RIGHT MIDDLE LOBE DUE TO INFECTIOUS ORGANISM: ICD-10-CM

## 2025-04-15 PROCEDURE — 71250 CT THORAX DX C-: CPT

## 2025-04-22 ENCOUNTER — OFFICE VISIT (OUTPATIENT)
Dept: PULMONOLOGY | Facility: CLINIC | Age: 75
End: 2025-04-22
Payer: MEDICARE

## 2025-04-22 ENCOUNTER — TELEPHONE (OUTPATIENT)
Age: 75
End: 2025-04-22

## 2025-04-22 VITALS
OXYGEN SATURATION: 96 % | SYSTOLIC BLOOD PRESSURE: 112 MMHG | DIASTOLIC BLOOD PRESSURE: 64 MMHG | TEMPERATURE: 98.6 F | HEIGHT: 68 IN | WEIGHT: 197 LBS | BODY MASS INDEX: 29.86 KG/M2 | HEART RATE: 96 BPM

## 2025-04-22 DIAGNOSIS — I10 ESSENTIAL HYPERTENSION: Primary | Chronic | ICD-10-CM

## 2025-04-22 DIAGNOSIS — I27.20 PULMONARY HYPERTENSION (HCC): Chronic | ICD-10-CM

## 2025-04-22 DIAGNOSIS — J30.1 ALLERGIC RHINITIS DUE TO POLLEN, UNSPECIFIED SEASONALITY: Chronic | ICD-10-CM

## 2025-04-22 DIAGNOSIS — R93.89 ABNORMAL CT OF THE CHEST: Primary | ICD-10-CM

## 2025-04-22 PROBLEM — R10.31 RLQ ABDOMINAL PAIN: Status: RESOLVED | Noted: 2024-12-04 | Resolved: 2025-04-22

## 2025-04-22 PROBLEM — R05.3 CHRONIC COUGH: Status: RESOLVED | Noted: 2024-12-04 | Resolved: 2025-04-22

## 2025-04-22 PROCEDURE — G2211 COMPLEX E/M VISIT ADD ON: HCPCS | Performed by: INTERNAL MEDICINE

## 2025-04-22 PROCEDURE — 99214 OFFICE O/P EST MOD 30 MIN: CPT | Performed by: INTERNAL MEDICINE

## 2025-04-22 NOTE — ASSESSMENT & PLAN NOTE
Significant improvement in fluid.  Minor inflammatory change in the left upper lobe  No urgent follow-up required, asymptomatic

## 2025-04-22 NOTE — TELEPHONE ENCOUNTER
Patient called the office requesting for the provider to place in an order for him to get a lipid panel done prior to his next appointment. Please review and  advise

## 2025-04-22 NOTE — PROGRESS NOTES
Follow-up  Visit - Pulmonary Medicine   Name: Murphy Willams      : 1950      MRN: [id number]  Encounter Provider: Darrell Torrez MD  Encounter Date: 2025   Encounter department: [facility] [place]  :  Assessment & Plan  Abnormal CT of the chest  Significant improvement in fluid.  Minor inflammatory change in the left upper lobe  No urgent follow-up required, asymptomatic           Pulmonary hypertension (HCC)  Last echocardiogram demonstrated estimated pulmonary systolic pressure of 75 mmHg  Remains asymptomatic, no signs of volume overload or RV dysfunction clinically  Has follow-up echo scheduled in September for Dr. Bueno.  Will reevaluate at that time or if development of symptoms in the interim         Allergic rhinitis due to pollen, unspecified seasonality  Recently has had increased allergy symptoms  He is taking fluticasone nasal spray, Claritin, and Singulair  Still with some nasal drip, encouraged to use the ipratropium as needed           Return in about 6 months (around 10/22/2025).    History of Present Illness   Murphy Willams is a 74 y.o. male who presents for follow-up of abnormal CAT scan of the chest with loculated pleural fluid, inflammatory change, and history of pulmonary hypertension which is asymptomatic.  He also tells me that he has been bothered by allergy symptoms recently.  No chest pain.  No abdominal pain.  He has been tolerating his CLL treatments and doing well.  He did get a second opinion and remains on same therapy.    He denies any other new symptoms.  He has been concerned about his liver enzymes although these are quite normal.  He does not have any new risk factors.  He continues to drink alcohol but has cut down substantially.    Review of systems:  Aside from what is mentioned in the HPI, ROS is otherwise negative         Medical History Reviewed by provider this encounter:  Tobacco  Allergies  Meds  Problems  Med Hx  Surg Hx   "Fam Hx     .    Objective   /64 (BP Location: Left arm, Patient Position: Sitting, Cuff Size: Large)   Pulse 96   Temp 98.6 °F (37 °C) (Tympanic)   Ht 5' 7.99\" (1.727 m)   Wt 89.4 kg (197 lb)   SpO2 96%   BMI 29.96 kg/m²   Physical Exam:   Gen:  Comfortable on room air.  No conversational dyspnea  HEENT:  Conjugate gaze.  sclerae anicteric.  Oropharynx moist  Neck: Trachea is midline. No JVD. No adenopathy  Chest: Excursion is symmetric, lungs clear  Cardiac: Regular. no murmur  Abdomen:  benign  Extremities: No edema, livido reticularis rash seems to have resolved  Neuro:  Normal speech and mentation      Diagnostic Data:  Labs: I personally reviewed the most recent laboratory data pertinent to today's visit.  Liver function was reviewed.  Slight chronic elevation in bilirubin  CBC with slightly low white blood cell count but otherwise stable     Radiology results:    4/15/2025 CAT scan of the chest was viewed on the PACS system and demonstrates minor inflammatory changes in the left upper lobe/lingula and persistent loculated fluid in the fissure which has in the interim significantly reduced    Darrell Torrez MD      "

## 2025-04-22 NOTE — ASSESSMENT & PLAN NOTE
Recently has had increased allergy symptoms  He is taking fluticasone nasal spray, Claritin, and Singulair  Still with some nasal drip, encouraged to use the ipratropium as needed

## 2025-04-22 NOTE — ASSESSMENT & PLAN NOTE
Last echocardiogram demonstrated estimated pulmonary systolic pressure of 75 mmHg  Remains asymptomatic, no signs of volume overload or RV dysfunction clinically  Has follow-up echo scheduled in September for Dr. Bueno.  Will reevaluate at that time or if development of symptoms in the interim

## 2025-04-29 ENCOUNTER — RA CDI HCC (OUTPATIENT)
Dept: OTHER | Facility: HOSPITAL | Age: 75
End: 2025-04-29

## 2025-05-05 ENCOUNTER — APPOINTMENT (OUTPATIENT)
Dept: LAB | Facility: HOSPITAL | Age: 75
End: 2025-05-05
Payer: MEDICARE

## 2025-05-05 DIAGNOSIS — I10 ESSENTIAL HYPERTENSION: Chronic | ICD-10-CM

## 2025-05-05 DIAGNOSIS — C91.10 CLL (CHRONIC LYMPHOCYTIC LEUKEMIA) (HCC): ICD-10-CM

## 2025-05-05 LAB
ALBUMIN SERPL BCG-MCNC: 4.7 G/DL (ref 3.5–5)
ALP SERPL-CCNC: 108 U/L (ref 34–104)
ALT SERPL W P-5'-P-CCNC: 52 U/L (ref 7–52)
ANION GAP SERPL CALCULATED.3IONS-SCNC: 6 MMOL/L (ref 4–13)
AST SERPL W P-5'-P-CCNC: 35 U/L (ref 13–39)
BASOPHILS # BLD AUTO: 0.01 THOUSANDS/ÂΜL (ref 0–0.1)
BASOPHILS NFR BLD AUTO: 0 % (ref 0–1)
BILIRUB SERPL-MCNC: 0.94 MG/DL (ref 0.2–1)
BUN SERPL-MCNC: 12 MG/DL (ref 5–25)
CALCIUM SERPL-MCNC: 9.2 MG/DL (ref 8.4–10.2)
CHLORIDE SERPL-SCNC: 94 MMOL/L (ref 96–108)
CHOLEST SERPL-MCNC: 101 MG/DL (ref ?–200)
CO2 SERPL-SCNC: 32 MMOL/L (ref 21–32)
CREAT SERPL-MCNC: 0.77 MG/DL (ref 0.6–1.3)
EOSINOPHIL # BLD AUTO: 0 THOUSAND/ÂΜL (ref 0–0.61)
EOSINOPHIL NFR BLD AUTO: 0 % (ref 0–6)
ERYTHROCYTE [DISTWIDTH] IN BLOOD BY AUTOMATED COUNT: 14.2 % (ref 11.6–15.1)
GFR SERPL CREATININE-BSD FRML MDRD: 90 ML/MIN/1.73SQ M
GLUCOSE P FAST SERPL-MCNC: 112 MG/DL (ref 65–99)
HCT VFR BLD AUTO: 37.1 % (ref 36.5–49.3)
HDLC SERPL-MCNC: 31 MG/DL
HGB BLD-MCNC: 12.9 G/DL (ref 12–17)
IMM GRANULOCYTES # BLD AUTO: 0.05 THOUSAND/UL (ref 0–0.2)
IMM GRANULOCYTES NFR BLD AUTO: 1 % (ref 0–2)
LDH SERPL-CCNC: 181 U/L (ref 140–271)
LDLC SERPL CALC-MCNC: 51 MG/DL (ref 0–100)
LYMPHOCYTES # BLD AUTO: 0.66 THOUSANDS/ÂΜL (ref 0.6–4.47)
LYMPHOCYTES NFR BLD AUTO: 14 % (ref 14–44)
MCH RBC QN AUTO: 33.4 PG (ref 26.8–34.3)
MCHC RBC AUTO-ENTMCNC: 34.8 G/DL (ref 31.4–37.4)
MCV RBC AUTO: 96 FL (ref 82–98)
MONOCYTES # BLD AUTO: 0.73 THOUSAND/ÂΜL (ref 0.17–1.22)
MONOCYTES NFR BLD AUTO: 16 % (ref 4–12)
NEUTROPHILS # BLD AUTO: 3.12 THOUSANDS/ÂΜL (ref 1.85–7.62)
NEUTS SEG NFR BLD AUTO: 69 % (ref 43–75)
NRBC BLD AUTO-RTO: 0 /100 WBCS
PLATELET # BLD AUTO: 173 THOUSANDS/UL (ref 149–390)
PMV BLD AUTO: 9.8 FL (ref 8.9–12.7)
POTASSIUM SERPL-SCNC: 3.8 MMOL/L (ref 3.5–5.3)
PROT SERPL-MCNC: 7.4 G/DL (ref 6.4–8.4)
RBC # BLD AUTO: 3.86 MILLION/UL (ref 3.88–5.62)
SODIUM SERPL-SCNC: 132 MMOL/L (ref 135–147)
TRIGL SERPL-MCNC: 95 MG/DL (ref ?–150)
URATE SERPL-MCNC: 7.7 MG/DL (ref 3.5–8.5)
WBC # BLD AUTO: 4.57 THOUSAND/UL (ref 4.31–10.16)

## 2025-05-05 PROCEDURE — 84550 ASSAY OF BLOOD/URIC ACID: CPT

## 2025-05-05 PROCEDURE — 36415 COLL VENOUS BLD VENIPUNCTURE: CPT

## 2025-05-05 PROCEDURE — 83615 LACTATE (LD) (LDH) ENZYME: CPT

## 2025-05-05 PROCEDURE — 80053 COMPREHEN METABOLIC PANEL: CPT

## 2025-05-05 PROCEDURE — 80061 LIPID PANEL: CPT

## 2025-05-05 PROCEDURE — 85025 COMPLETE CBC W/AUTO DIFF WBC: CPT

## 2025-05-07 ENCOUNTER — OFFICE VISIT (OUTPATIENT)
Dept: FAMILY MEDICINE CLINIC | Facility: CLINIC | Age: 75
End: 2025-05-07
Payer: MEDICARE

## 2025-05-07 VITALS — HEART RATE: 89 BPM | DIASTOLIC BLOOD PRESSURE: 80 MMHG | OXYGEN SATURATION: 99 % | SYSTOLIC BLOOD PRESSURE: 132 MMHG

## 2025-05-07 DIAGNOSIS — C91.10 CLL (CHRONIC LYMPHOCYTIC LEUKEMIA) (HCC): Chronic | ICD-10-CM

## 2025-05-07 DIAGNOSIS — R73.9 HYPERGLYCEMIA: ICD-10-CM

## 2025-05-07 DIAGNOSIS — D59.10 AUTOIMMUNE HEMOLYTIC ANEMIA (HCC): ICD-10-CM

## 2025-05-07 DIAGNOSIS — I27.20 PULMONARY HYPERTENSION (HCC): Chronic | ICD-10-CM

## 2025-05-07 DIAGNOSIS — I48.0 PAROXYSMAL A-FIB (HCC): Primary | ICD-10-CM

## 2025-05-07 DIAGNOSIS — I50.42 CHRONIC COMBINED SYSTOLIC AND DIASTOLIC CONGESTIVE HEART FAILURE (HCC): ICD-10-CM

## 2025-05-07 DIAGNOSIS — I10 ESSENTIAL HYPERTENSION: Chronic | ICD-10-CM

## 2025-05-07 DIAGNOSIS — F33.9 DEPRESSION, RECURRENT (HCC): ICD-10-CM

## 2025-05-07 DIAGNOSIS — E53.8 VITAMIN B 12 DEFICIENCY: ICD-10-CM

## 2025-05-07 DIAGNOSIS — E78.00 HYPERCHOLESTEROLEMIA: Chronic | ICD-10-CM

## 2025-05-07 DIAGNOSIS — E87.1 HYPONATREMIA: ICD-10-CM

## 2025-05-07 PROCEDURE — G2211 COMPLEX E/M VISIT ADD ON: HCPCS | Performed by: FAMILY MEDICINE

## 2025-05-07 PROCEDURE — 99214 OFFICE O/P EST MOD 30 MIN: CPT | Performed by: FAMILY MEDICINE

## 2025-05-07 NOTE — PROGRESS NOTES
Name: Murphy Willams      : 1950      MRN: [id number]  Encounter Provider: Pravin Ortega Jr, MD  Encounter Date: 2025   Encounter department: [facility]  :  Assessment & Plan  Paroxysmal A-fib (HCC)  He is in sinus rhythm today.  He is now following with Dr. Bueno.  He had a discussion with the patient's EP doctors and they decided to discontinue Pradaxa since he has had no notable recurrence of atrial fibrillation.  Continue close follow-up with cardiology.  Continue with device monitoring.       Essential hypertension  Blood pressure is well-controlled this time.  Continue current regimen.       Chronic combined systolic and diastolic congestive heart failure (HCC)  Wt Readings from Last 3 Encounters:   25 89.4 kg (197 lb)   25 91.2 kg (201 lb)   25 90.3 kg (199 lb)   Fortune, there is no signs of fluid overload at this time.                 Depression, recurrent (HCC)  This does seem to be stable at this time.  He is a bit concerned about his wife and some of her memory issues but seems to be stable.         Hyperglycemia  Monitor A1c prior to follow-up  Orders:    Hemoglobin A1C; Future    Hyponatremia  He has a mild hyponatremia likely secondary to his increase in hydrochlorothiazide.  He will be having labs done through hematology and I will follow them closely.  Continue routine lab monitoring       Vitamin B 12 deficiency         Pulmonary hypertension (HCC)  Stable at this time.  He is following with pulmonary.       CLL (chronic lymphocytic leukemia) (HCC)  Continue follow-up with oncology.       Autoimmune hemolytic anemia (HCC)    Continue follow-up with oncology.         Hypercholesterolemia  Continue statin therapy.  Lipids looked great last time they were checked.              History of Present Illness   Patient presents today for follow-up of chronic health issues.  He notes he is actually feeling pretty well.  He denies any current  issues with chest pain, shortness of breath or palpitations.  He is having no lightheadedness.  He does admit to some fatigue on occasion but feels that this is pretty stable as well.  He follows closely with cardiology and they recently stopped his Pradaxa.      Review of Systems   Constitutional:  Negative for appetite change, chills, fatigue, fever and unexpected weight change.   HENT:  Negative for trouble swallowing.    Eyes:  Negative for visual disturbance.   Respiratory:  Negative for cough, chest tightness, shortness of breath and wheezing.    Cardiovascular:  Negative for chest pain, palpitations and leg swelling.   Gastrointestinal:  Negative for abdominal distention, abdominal pain, blood in stool, constipation and diarrhea.   Endocrine: Negative for polyuria.   Genitourinary:  Negative for difficulty urinating and flank pain.   Musculoskeletal:  Negative for arthralgias and myalgias.   Skin:  Negative for rash.   Neurological:  Negative for dizziness and light-headedness.   Hematological:  Negative for adenopathy. Does not bruise/bleed easily.   Psychiatric/Behavioral:  Negative for dysphoric mood and sleep disturbance. The patient is not nervous/anxious.        Objective   /80   Pulse 89   SpO2 99%      Physical Exam  Constitutional:       General: He is not in acute distress.     Appearance: Normal appearance. He is well-developed. He is obese. He is not diaphoretic.   HENT:      Head: Normocephalic.      Right Ear: External ear normal.      Left Ear: External ear normal.      Nose: Nose normal.   Eyes:      General:         Right eye: No discharge.         Left eye: No discharge.      Conjunctiva/sclera: Conjunctivae normal.      Pupils: Pupils are equal, round, and reactive to light.   Neck:      Thyroid: No thyromegaly.      Trachea: No tracheal deviation.   Cardiovascular:      Rate and Rhythm: Normal rate and regular rhythm.      Heart sounds: Normal heart sounds. No murmur heard.     No  friction rub.   Pulmonary:      Effort: Pulmonary effort is normal. No respiratory distress.      Breath sounds: Normal breath sounds. No wheezing.   Chest:      Chest wall: No tenderness.   Abdominal:      General: There is no distension.      Palpations: There is no mass.      Tenderness: There is no abdominal tenderness. There is no guarding or rebound.      Hernia: No hernia is present.   Musculoskeletal:         General: No swelling or deformity.      Cervical back: Normal range of motion.      Right lower leg: No edema.      Left lower leg: No edema.   Skin:     Findings: No erythema or rash.   Neurological:      General: No focal deficit present.      Mental Status: He is alert.      Cranial Nerves: No cranial nerve deficit.      Coordination: Coordination normal.   Psychiatric:         Thought Content: Thought content normal.

## 2025-05-07 NOTE — ASSESSMENT & PLAN NOTE
He is in sinus rhythm today.  He is now following with Dr. Buneo.  He had a discussion with the patient's EP doctors and they decided to discontinue Pradaxa since he has had no notable recurrence of atrial fibrillation.  Continue close follow-up with cardiology.  Continue with device monitoring.

## 2025-05-07 NOTE — ASSESSMENT & PLAN NOTE
Wt Readings from Last 3 Encounters:   04/22/25 89.4 kg (197 lb)   04/07/25 91.2 kg (201 lb)   03/21/25 90.3 kg (199 lb)   Johnson, there is no signs of fluid overload at this time.

## 2025-05-07 NOTE — ASSESSMENT & PLAN NOTE
This does seem to be stable at this time.  He is a bit concerned about his wife and some of her memory issues but seems to be stable.

## 2025-05-07 NOTE — ASSESSMENT & PLAN NOTE
He has a mild hyponatremia likely secondary to his increase in hydrochlorothiazide.  He will be having labs done through hematology and I will follow them closely.  Continue routine lab monitoring

## 2025-05-08 DIAGNOSIS — C91.10 CLL (CHRONIC LYMPHOCYTIC LEUKEMIA) (HCC): ICD-10-CM

## 2025-05-09 ENCOUNTER — TELEPHONE (OUTPATIENT)
Age: 75
End: 2025-05-09

## 2025-05-09 DIAGNOSIS — M48.061 SPINAL STENOSIS OF LUMBAR REGION WITHOUT NEUROGENIC CLAUDICATION: Primary | Chronic | ICD-10-CM

## 2025-05-09 NOTE — TELEPHONE ENCOUNTER
Patient called in regards to wanting to know if provider can recommend him to a provider for Spine & Pain management, patient would like a call back with information.

## 2025-05-09 NOTE — PROGRESS NOTES
Patient forgot to mention that low back pain is getting worse when he was here at his visit.  I placed a referral to pain management.  Problem List Items Addressed This Visit          Orthopedic/Musculoskeletal    Spinal stenosis of lumbar region - Primary (Chronic)    Relevant Orders    Ambulatory referral to Spine & Pain Management

## 2025-05-12 ENCOUNTER — NURSE TRIAGE (OUTPATIENT)
Age: 75
End: 2025-05-12

## 2025-05-12 NOTE — TELEPHONE ENCOUNTER
"FOLLOW UP: Please advise     REASON FOR CONVERSATION: diarrhea/med question    SYMPTOMS: Diarrhea- loose little blobs\" since 5/8/25, gassy    OTHER: Pt is taking Gas X and Miralax 1 capful daily- took it twice/day on 5/10/25 only.  Pt feels he may not be emptying completely, concerned he is not going enough.     Pt states he is under increased stress due to caring for wife who has brain damage from an accident in the 90s.  Her symptoms are worsening and she may have dementia.  He is also under treatment for leukemia  that relapsed last August. Pt states he feels like he \"worries about every little pain anymore.\"      He is taking Venclexta and is unsure if symptoms could be a side effect from the medication or IBS.  States Dr. Diaz told him that he had it 30 yrs ago, but would like to know if there is a way to \"diagnose\" it.     DISPOSITION: Discuss with Provider and Call Back Patient (overriding Home Care)      Reason for Disposition   MILD constipation    Answer Assessment - Initial Assessment Questions  1. STOOL PATTERN OR FREQUENCY: \"How often do you have a bowel movement (BM)?\"  (Normal range: 3 times a day to every 3 days)  \"When was your last BM?\"        Every couple of days with alternating loose small stool and constipation  2. STRAINING: \"Do you have to strain to have a BM?\"       No   3. ONSET: \"When did the constipation begin?\"      5/8/25  4. RECTAL PAIN: \"Does your rectum hurt when the stool comes out?\" If Yes, ask: \"Do you have hemorrhoids? How bad is the pain?\"  (Scale 1-10; or mild, moderate, severe)      No   5. BM COMPOSITION: \"Are the stools hard?\"       Loose little blobs of stool  6. BLOOD ON STOOLS: \"Has there been any blood on the toilet tissue or on the surface of the BM?\" If Yes, ask: \"When was the last time?\"      No   7. CHRONIC CONSTIPATION: \"Is this a new problem for you?\"  If No, ask: \"How long have you had this problem?\" (days, weeks, months)       No   8. CHANGES IN DIET OR " "HYDRATION: \"Have there been any recent changes in your diet?\" \"How much fluids are you drinking on a daily basis?\"  \"How much have you had to drink today?\"      No  9. MEDICINES: \"Have you been taking any new medicines?\" \"Are you taking any narcotic pain medicines?\" (e.g., Dilaudid, morphine, Percocet, Vicodin)      Taking Venclexta for CLL x 6 months  10. LAXATIVES: \"Have you been using any stool softeners, laxatives, or enemas?\"  If Yes, ask \"What, how often, and when was the last time?\"        Miralax 1 capful daily, tried taking twice a day on 5/10/25 only  11. ACTIVITY:  \"How much walking do you do every day?\"  \"Has your activity level decreased in the past week?\"         No concerns   12. CAUSE: \"What do you think is causing the constipation?\"         Unsure if due to IBS or Venclexta  13. OTHER SYMPTOMS: \"Do you have any other symptoms?\" (e.g., abdomen pain, bloating, fever, vomiting)      Increased gas - taking Gas X which is effective.    Protocols used: Constipation-Adult-OH    "

## 2025-05-12 NOTE — TELEPHONE ENCOUNTER
Patient called and stated he will be contacting the pain management office located at [place]. Patient stated however once pcp responds with recommendation to please return his call to notify.

## 2025-05-13 NOTE — TELEPHONE ENCOUNTER
Patient calling back since has not heard anything regarding call yesterday.  Patient is very anxious and would like to speak to provider regarding symptoms noted yesterday.      Taking miralax daily.  Will increase to twice daily.  Concerned with the pencil like stools or blobs that he is having.

## 2025-05-21 ENCOUNTER — TELEPHONE (OUTPATIENT)
Age: 75
End: 2025-05-21

## 2025-05-21 NOTE — TELEPHONE ENCOUNTER
Patient called in d/t he is hoping to switch supply order to [facility]. Patient had previously in the past used coloplast catheters which worked well for him but when he had to change pharmacies to [facility] those type were unavailable and he was given lubricated catheters. Patient stating his current catheters are causing a lot of irritation and is hopeful to switch back to coloplast through [facility]. He spoke with [facility] and they advised they would need an Rx and documentation as to why he needs coloplast vs the ones he has now. Patient is hopeful we can send Rx to [facility] with updated documentation of why the switch.

## 2025-05-22 NOTE — TELEPHONE ENCOUNTER
Disregard previous message about [facility]    Patient wants to switch back to [facility], Please send new script for Speedi cath 14 FR, 6 times daily

## 2025-05-23 NOTE — TELEPHONE ENCOUNTER
Physician order for 14fr straight intermittent catheters 6x/day.  Fax to [facility]. Confirmation received and scanned into the patient's chart.

## 2025-06-02 ENCOUNTER — APPOINTMENT (OUTPATIENT)
Dept: LAB | Facility: HOSPITAL | Age: 75
End: 2025-06-02
Payer: MEDICARE

## 2025-06-02 DIAGNOSIS — C91.10 CLL (CHRONIC LYMPHOCYTIC LEUKEMIA) (HCC): ICD-10-CM

## 2025-06-02 LAB
ALBUMIN SERPL BCG-MCNC: 4.7 G/DL (ref 3.5–5)
ALP SERPL-CCNC: 107 U/L (ref 34–104)
ALT SERPL W P-5'-P-CCNC: 49 U/L (ref 7–52)
ANION GAP SERPL CALCULATED.3IONS-SCNC: 7 MMOL/L (ref 4–13)
ANISOCYTOSIS BLD QL SMEAR: PRESENT
AST SERPL W P-5'-P-CCNC: 39 U/L (ref 13–39)
BASOPHILS # BLD MANUAL: 0 THOUSAND/UL (ref 0–0.1)
BASOPHILS NFR MAR MANUAL: 0 % (ref 0–1)
BILIRUB SERPL-MCNC: 1.15 MG/DL (ref 0.2–1)
BUN SERPL-MCNC: 11 MG/DL (ref 5–25)
CALCIUM SERPL-MCNC: 9.3 MG/DL (ref 8.4–10.2)
CHLORIDE SERPL-SCNC: 97 MMOL/L (ref 96–108)
CO2 SERPL-SCNC: 29 MMOL/L (ref 21–32)
CREAT SERPL-MCNC: 0.67 MG/DL (ref 0.6–1.3)
EOSINOPHIL # BLD MANUAL: 0 THOUSAND/UL (ref 0–0.4)
EOSINOPHIL NFR BLD MANUAL: 0 % (ref 0–6)
ERYTHROCYTE [DISTWIDTH] IN BLOOD BY AUTOMATED COUNT: 15.1 % (ref 11.6–15.1)
GFR SERPL CREATININE-BSD FRML MDRD: 95 ML/MIN/1.73SQ M
GLUCOSE P FAST SERPL-MCNC: 114 MG/DL (ref 65–99)
HCT VFR BLD AUTO: 35.2 % (ref 36.5–49.3)
HGB BLD-MCNC: 12.3 G/DL (ref 12–17)
IGG SERPL-MCNC: 712 MG/DL (ref 635–1741)
LDH SERPL-CCNC: 195 U/L (ref 140–271)
LYMPHOCYTES # BLD AUTO: 0.37 THOUSAND/UL (ref 0.6–4.47)
LYMPHOCYTES # BLD AUTO: 8 % (ref 14–44)
MCH RBC QN AUTO: 33.4 PG (ref 26.8–34.3)
MCHC RBC AUTO-ENTMCNC: 34.9 G/DL (ref 31.4–37.4)
MCV RBC AUTO: 96 FL (ref 82–98)
METAMYELOCYTE ABSOLUTE CT: 0.08 THOUSAND/UL (ref 0–0.1)
METAMYELOCYTES NFR BLD MANUAL: 2 % (ref 0–1)
MONOCYTES # BLD AUTO: 0.37 THOUSAND/UL (ref 0–1.22)
MONOCYTES NFR BLD: 9 % (ref 4–12)
MYELOCYTE ABSOLUTE CT: 0.08 THOUSAND/UL (ref 0–0.1)
MYELOCYTES NFR BLD MANUAL: 2 % (ref 0–1)
NEUTROPHILS # BLD MANUAL: 3.23 THOUSAND/UL (ref 1.85–7.62)
NEUTS BAND NFR BLD MANUAL: 9 % (ref 0–8)
NEUTS SEG NFR BLD AUTO: 69 % (ref 43–75)
PLATELET # BLD AUTO: 168 THOUSANDS/UL (ref 149–390)
PLATELET BLD QL SMEAR: ADEQUATE
PMV BLD AUTO: 9.4 FL (ref 8.9–12.7)
POLYCHROMASIA BLD QL SMEAR: PRESENT
POTASSIUM SERPL-SCNC: 3.8 MMOL/L (ref 3.5–5.3)
PROT SERPL-MCNC: 7.2 G/DL (ref 6.4–8.4)
RBC # BLD AUTO: 3.68 MILLION/UL (ref 3.88–5.62)
RBC MORPH BLD: PRESENT
SODIUM SERPL-SCNC: 133 MMOL/L (ref 135–147)
URATE SERPL-MCNC: 6.8 MG/DL (ref 3.5–8.5)
VARIANT LYMPHS # BLD AUTO: 1 %
WBC # BLD AUTO: 4.14 THOUSAND/UL (ref 4.31–10.16)

## 2025-06-02 PROCEDURE — 84550 ASSAY OF BLOOD/URIC ACID: CPT

## 2025-06-02 PROCEDURE — 82784 ASSAY IGA/IGD/IGG/IGM EACH: CPT

## 2025-06-02 PROCEDURE — 85027 COMPLETE CBC AUTOMATED: CPT

## 2025-06-02 PROCEDURE — 83615 LACTATE (LD) (LDH) ENZYME: CPT

## 2025-06-02 PROCEDURE — 85007 BL SMEAR W/DIFF WBC COUNT: CPT

## 2025-06-02 PROCEDURE — 36415 COLL VENOUS BLD VENIPUNCTURE: CPT

## 2025-06-02 PROCEDURE — 80053 COMPREHEN METABOLIC PANEL: CPT

## 2025-06-02 NOTE — TELEPHONE ENCOUNTER
Pt called refill line. He does not want refills but wanted to let us know that he is updating his pharmacy to [facility]. No further action needed at this time.

## 2025-06-03 ENCOUNTER — OFFICE VISIT (OUTPATIENT)
Dept: HEMATOLOGY ONCOLOGY | Facility: CLINIC | Age: 75
End: 2025-06-03
Payer: MEDICARE

## 2025-06-03 VITALS
HEART RATE: 92 BPM | TEMPERATURE: 97.8 F | OXYGEN SATURATION: 98 % | RESPIRATION RATE: 18 BRPM | WEIGHT: 196 LBS | BODY MASS INDEX: 29.7 KG/M2 | HEIGHT: 68 IN | SYSTOLIC BLOOD PRESSURE: 122 MMHG | DIASTOLIC BLOOD PRESSURE: 70 MMHG

## 2025-06-03 DIAGNOSIS — I48.0 PAROXYSMAL A-FIB (HCC): ICD-10-CM

## 2025-06-03 DIAGNOSIS — R16.2 HEPATOSPLENOMEGALY: ICD-10-CM

## 2025-06-03 DIAGNOSIS — C91.10 CLL (CHRONIC LYMPHOCYTIC LEUKEMIA) (HCC): Primary | ICD-10-CM

## 2025-06-03 DIAGNOSIS — M19.90 ARTHRITIS: ICD-10-CM

## 2025-06-03 DIAGNOSIS — I50.42 CHRONIC COMBINED SYSTOLIC AND DIASTOLIC CONGESTIVE HEART FAILURE (HCC): ICD-10-CM

## 2025-06-03 DIAGNOSIS — I27.20 PULMONARY HYPERTENSION (HCC): ICD-10-CM

## 2025-06-03 DIAGNOSIS — K70.30 ALCOHOLIC CIRRHOSIS OF LIVER WITHOUT ASCITES (HCC): ICD-10-CM

## 2025-06-03 DIAGNOSIS — D59.10 AUTOIMMUNE HEMOLYTIC ANEMIA (HCC): ICD-10-CM

## 2025-06-03 DIAGNOSIS — E80.6 HYPERBILIRUBINEMIA: ICD-10-CM

## 2025-06-03 DIAGNOSIS — Z95.0 PACEMAKER: ICD-10-CM

## 2025-06-03 PROCEDURE — 99214 OFFICE O/P EST MOD 30 MIN: CPT | Performed by: INTERNAL MEDICINE

## 2025-06-03 PROCEDURE — G2211 COMPLEX E/M VISIT ADD ON: HCPCS | Performed by: INTERNAL MEDICINE

## 2025-06-03 NOTE — ASSESSMENT & PLAN NOTE
Wt Readings from Last 3 Encounters:   06/03/25 88.9 kg (196 lb)   04/22/25 89.4 kg (197 lb)   04/07/25 91.2 kg (201 lb)   Managed by his cardiologist

## 2025-06-03 NOTE — PROGRESS NOTES
Name: Murphy Willams      : 1950      MRN: [id number]  Encounter Provider: David Hanley MD  Encounter Date: 6/3/2025   Encounter department: [facility]  :  Assessment & Plan  CLL (chronic lymphocytic leukemia) (HCC)  Patient has been on venetoclax 200 mg daily.  He is done with Gazyva.  CLL remains in remission.  He has been tolerating venetoclax without much problem       Autoimmune hemolytic anemia (HCC)  No active hemolysis at present       Paroxysmal A-fib (HCC)  Managed by his cardiologist       Pulmonary hypertension (HCC)  Managed by his cardiologist and lung specialist       Hepatosplenomegaly  Patient has history of cirrhosis of liver secondary to alcoholism       Chronic combined systolic and diastolic congestive heart failure (HCC)  Wt Readings from Last 3 Encounters:   25 88.9 kg (196 lb)   25 89.4 kg (197 lb)   25 91.2 kg (201 lb)   Managed by his cardiologist                 Pacemaker  Managed by his cardiologist       Hyperbilirubinemia  Patient has history of cirrhosis of liver       Arthritis  Has minor arthritic symptoms       Alcoholic cirrhosis of liver without ascites (HCC)  Patient has stopped drinking alcohol.       No change in therapy.  Blood work every 4 weeks as before.  Visit in 2 months.  See diagnoses, orders and instructions above and I have explained this to patient and his wife in detail.  Questions answered.  Goal is prolonged remission from CLL.  Patient is capable of self-care at this time.  I suggested eating healthy foods, staying active but to avoid falls and trauma.  Suggested health screening tests. Patient to continue to follow-up with primary physician and other consultants.  Provided counseling and support.  I used a dictation device to dictate this note and there could be mistakes in my note and for that patient may contact my office.        History of Present Illness   Chief Complaint   Patient  presents with    Follow-up   Patient is here with his wife.  In 2010 patient was diagnosed to have CLL and after a long.  Surveillance he developed acute autoimmune hemolytic anemia with profound anemia and that was in 2021 and was treated with prednisone and folic acid.  He did well until September 2024 when he was hospitalized with pneumonia and there was significant change in his CLL hematological picture.  WBC count has increased to more than 200,000. He had bone marrow test in the hospital and that showed 50% infiltration with CLL in September 2024.  He had profound anemia but there was no active hemolysis and he was thought to have GI bleeding at that time but that was not proven.  He had received blood transfusions.  History of atrial fibrillation and he has been on Pradaxa.  He was not a good candidate for BTK inhibitor because of atrial fibrillation and Pradaxa.    He was started on Gazyva in September 2024.  Venetoclax was added in November 2024.  Last dose of Gazyva was in February 2025.  He is being continued on venetoclax 200 mg daily.  He has shown very good response and CLL remains in remission.  He has other medical problems primarily cardiac and pulmonary and also has history of alcoholic liver cirrhosis.  Chronically prolonged QT interval and he has dual-chamber/biventricular AICD that was placed in March 2023.  History of rash secondary to drug?  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  Patient had iron deficiency previously and had GI evaluation and had intravenous Venofer.  He follows with his gastroenterologist.   He has enlarged prostate and nocturia and he follows with his urologist.      He also follows with his lung specialist for exertional dyspnea and COPD.  History of cirrhosis of liver.  He gives history of B12 deficiency and has been taking B12 orally.  Presently he has been feeling much better.  He goes to gym.  Has much less weakness and  "tiredness and exertional dyspnea.  Minor arthritic symptoms.  Pertinent Medical History   See details in HPI.  06/03/25:      Review of Systems  Reviewed 12 systems.  Symptoms are in HPI.  No other neurological, cardiac, pulmonary, GI or  symptoms other than listed in HPI.  No fever, chills, bleeding, bone pains, skin rash, weight loss, night sweats, and no swelling of the ankles and no swelling of lymph glands.      Objective   /70 (BP Location: Right arm, Patient Position: Sitting, Cuff Size: Large)   Pulse 92   Temp 97.8 °F (36.6 °C) (Temporal)   Resp 18   Ht 5' 7.99\" (1.727 m)   Wt 88.9 kg (196 lb)   SpO2 98%   BMI 29.81 kg/m²     Physical Exam  Vitals reviewed.   Constitutional:       General: He is not in acute distress.     Appearance: Normal appearance. He is not ill-appearing.   HENT:      Head: Normocephalic and atraumatic.      Mouth/Throat:      Comments: No thrush.    Eyes:      General: No scleral icterus.        Right eye: No discharge.         Left eye: No discharge.      Conjunctiva/sclera: Conjunctivae normal.      Pupils: Pupils are equal, round, and reactive to light.       Cardiovascular:      Rate and Rhythm: Normal rate. Rhythm irregular.      Heart sounds: Murmur heard.   Pulmonary:      Effort: Pulmonary effort is normal. No respiratory distress.      Breath sounds: Normal breath sounds. No wheezing, rhonchi or rales.   Abdominal:      General: Abdomen is flat. There is no distension.      Palpations: Abdomen is soft. There is no mass.      Tenderness: There is no abdominal tenderness.      Comments: No ascites.      Musculoskeletal:         General: No swelling or tenderness. Normal range of motion.      Cervical back: Normal range of motion. No rigidity or tenderness.      Right lower leg: No edema.      Left lower leg: No edema.      Comments: No calf tenderness.   Lymphadenopathy:      Cervical: No cervical adenopathy.      Upper Body:      Right upper body: No " supraclavicular or axillary adenopathy.      Left upper body: No supraclavicular or axillary adenopathy.     Skin:     General: Skin is warm.      Coloration: Skin is not jaundiced or pale.      Findings: No bruising or rash.      Nails: There is no clubbing.     Neurological:      General: No focal deficit present.      Mental Status: He is alert and oriented to person, place, and time.      Motor: Weakness present.      Coordination: Coordination normal.      Gait: Gait normal.     Psychiatric:         Mood and Affect: Mood normal.         Behavior: Behavior normal.         Thought Content: Thought content normal.     ECOG 1    Labs: I have reviewed the following labs:  Results for orders placed or performed in visit on 06/02/25   CBC and differential   Result Value Ref Range    WBC 4.14 (L) 4.31 - 10.16 Thousand/uL    RBC 3.68 (L) 3.88 - 5.62 Million/uL    Hemoglobin 12.3 12.0 - 17.0 g/dL    Hematocrit 35.2 (L) 36.5 - 49.3 %    MCV 96 82 - 98 fL    MCH 33.4 26.8 - 34.3 pg    MCHC 34.9 31.4 - 37.4 g/dL    RDW 15.1 11.6 - 15.1 %    MPV 9.4 8.9 - 12.7 fL    Platelets 168 149 - 390 Thousands/uL   Comprehensive metabolic panel   Result Value Ref Range    Sodium 133 (L) 135 - 147 mmol/L    Potassium 3.8 3.5 - 5.3 mmol/L    Chloride 97 96 - 108 mmol/L    CO2 29 21 - 32 mmol/L    ANION GAP 7 4 - 13 mmol/L    BUN 11 5 - 25 mg/dL    Creatinine 0.67 0.60 - 1.30 mg/dL    Glucose, Fasting 114 (H) 65 - 99 mg/dL    Calcium 9.3 8.4 - 10.2 mg/dL    AST 39 13 - 39 U/L    ALT 49 7 - 52 U/L    Alkaline Phosphatase 107 (H) 34 - 104 U/L    Total Protein 7.2 6.4 - 8.4 g/dL    Albumin 4.7 3.5 - 5.0 g/dL    Total Bilirubin 1.15 (H) 0.20 - 1.00 mg/dL    eGFR 95 ml/min/1.73sq m   LD,Blood   Result Value Ref Range     140 - 271 U/L   Result Value Ref Range    Uric Acid 6.8 3.5 - 8.5 mg/dL   Result Value Ref Range     635 - 1,741 mg/dL   Manual Differential(PHLEBS Do Not Order)   Result Value Ref Range    Segmented % 69 43 - 75  %    Bands % 9 (H) 0 - 8 %    Lymphocytes % 8 (L) 14 - 44 %    Monocytes % 9 4 - 12 %    Eosinophils % 0 0 - 6 %    Basophils % 0 0 - 1 %    Metamyelocytes % 2 (H) 0 - 1 %    Myelocytes % 2 (H) 0 - 1 %    Atypical Lymphocytes % 1 (H) <=0 %    Absolute Neutrophils 3.23 1.85 - 7.62 Thousand/uL    Absolute Lymphocytes 0.37 (L) 0.60 - 4.47 Thousand/uL    Absolute Monocytes 0.37 0.00 - 1.22 Thousand/uL    Absolute Eosinophils 0.00 0.00 - 0.40 Thousand/uL    Absolute Basophils 0.00 0.00 - 0.10 Thousand/uL    Absolute Metamyelocytes 0.08 0.00 - 0.10 Thousand/uL    Absolute Myelocytes 0.08 0.00 - 0.10 Thousand/uL    Total Counted      RBC Morphology Present     Platelet Estimate Adequate Adequate    Anisocytosis Present     Polychromasia Present      *Note: Due to a large number of results and/or encounters for the requested time period, some results have not been displayed. A complete set of results can be found in Results Review.     CT chest without contrast  Status: Final result     PACS Images - GE     Show images for CT chest without contrast  PACS Images - [facility]     Show images for CT chest without contrast  Study Result    Narrative & Impression   CT CHEST WITHOUT IV CONTRAST     INDICATION: J18.9: Pneumonia, unspecified organism.     COMPARISON: 9/2/2024, 12/5/2024     TECHNIQUE: CT examination of the chest was performed without intravenous contrast. Multiplanar 2D reformatted images were created from the source data.     This examination, like all CT scans performed in the [facility], was performed utilizing techniques to minimize radiation dose exposure, including the use of iterative reconstruction and automated exposure control. Radiation dose length   product (DLP) for this visit: 599 mGy-cm     FINDINGS:     LUNGS: Few ill-defined opacities most prominently seen within the left upper lobe and to a lesser extent in the right upper lobe, right middle lobe and lingula. Few scattered linear  bands.     PLEURA: Loculated fluid in the right fissure now measuring 1.6 x 2 cm. Previously measured 2.8 x 3.3 cm.     HEART/GREAT VESSELS: Cardiac size is enlarged. Coronary calcifications without significant pericardial effusion. Right cardiac chamber pacemaker leads. Fusiform ectasia of the ascending thoracic aorta measuring up to 43 mm. Recommendation is for   follow-up low radiation dose chest CT in one year.     MEDIASTINUM AND FREDDIE: No mediastinal lymphadenopathy.     CHEST WALL AND LOWER NECK: Pacemaker in the left anterior chest wall.     VISUALIZED STRUCTURES IN THE UPPER ABDOMEN: Unremarkable.     OSSEOUS STRUCTURES: Spinal degenerative changes are noted. No acute fracture or destructive osseous lesion.     IMPRESSION:     1.  Few ill-defined opacities predominantly seen in the left upper lobe could be due to an infectious or inflammatory process. Follow-up as clinically warranted.  2.  Areas of linear opacities are nonspecific and may be due to scarring or atelectasis.  3.  Decrease in size of the loculated fluid within the right fissure when compared to the previous exam.  4.  Fusiform ectasia of the ascending aorta measuring 43 mm. Consider annual surveillance imaging.              Workstation performed: [id number]        Imaging    CT chest without contrast (Order: [id number]) - 4/15/2025

## 2025-06-03 NOTE — ASSESSMENT & PLAN NOTE
Patient has been on venetoclax 200 mg daily.  He is done with Gazyva.  CLL remains in remission.  He has been tolerating venetoclax without much problem

## 2025-06-10 ENCOUNTER — PATIENT MESSAGE (OUTPATIENT)
Dept: FAMILY MEDICINE CLINIC | Facility: CLINIC | Age: 75
End: 2025-06-10

## 2025-06-11 NOTE — TELEPHONE ENCOUNTER
----- Message from Mary Varner sent at 9/14/2023  7:28 AM EDT -----    ----- Message -----  From: Krysten Roper MD  Sent: 9/13/2023  11:16 PM EDT  To: Mary Varner; Shiv Retana MD; Dortha Severs, DO    Thanks Rujul and Filiberto. I ordered his Mg and BMP. Destinee, can you please let him know we ordered a lab test for the morning of his procedure since he is at risk for torsades de pointe.    ----- Message -----  From: Shiv Retana MD  Sent: 9/11/2023  10:46 PM EDT  To: Mary Varner; Krysten Roper MD; Dortha Severs, DO    Agree with dr reeves's recommendations         ----- Message -----  From: Dortha Severs, DO  Sent: 9/11/2023   1:52 PM EDT  To: Mary Varner; Krysten Roper MD; Shiv Retana MD    Hey Dr. David Levy,     This pt has a colonoscopy on 9/18. He has long QT syndrome. Just a couple recommendations. 1) the prep may cause hypokalemia and hypomagnesemia and in turn cause higher QT intervals or torsades. Can you please check his magnesium and potassium levels the morning of his colonoscopy? If low, he would really need to be repleted before you start the colonoscopy. You can continue potassium/mag fluids during the procedure to keep him repleted. 2) I will repeat his BMP a few days after the procedure to make sure his potassium and magnesium stays stable. You do not have to do anything for that. He will call me to discuss results over the phone afterward. 3) anesthesia should not be an issue. He has an ICD, and anesthesia can watch him on telemetry at the time of his colonoscopy. Please touch base with me or Dr. Lizz lForez if you have any questions or concerns. Thank so much.     Dortha Severs  Cardiology Yes

## 2025-06-20 ENCOUNTER — RESULTS FOLLOW-UP (OUTPATIENT)
Dept: CARDIOLOGY CLINIC | Facility: CLINIC | Age: 75
End: 2025-06-20

## 2025-06-20 ENCOUNTER — IN-CLINIC DEVICE VISIT (OUTPATIENT)
Dept: CARDIOLOGY CLINIC | Facility: CLINIC | Age: 75
End: 2025-06-20
Payer: MEDICARE

## 2025-06-20 DIAGNOSIS — Z95.810 AICD (AUTOMATIC CARDIOVERTER/DEFIBRILLATOR) PRESENT: Primary | ICD-10-CM

## 2025-06-20 PROCEDURE — 93284 PRGRMG EVAL IMPLANTABLE DFB: CPT | Performed by: INTERNAL MEDICINE

## 2025-06-20 NOTE — PROGRESS NOTES
Results for orders placed or performed in visit on 06/20/25   Cardiac EP device report    Narrative    MDT BI-V ICD (DDIR) ACTIVE SYSTEM IS MRI CONDITIONAL  DEVICE INTERROGATED IN THE [place] OFFICE: PRESENTING EGRAM AP BVP@ 80 BPM. BATTERY VOLTAGE ADEQUATE-5 YRS. AP 97% CRT PACING (BIV) 100% (LV) 0%. ALL AVAILABLE LEAD PARAMETERS WITHIN NORMAL LIMITS. NO SIGNIFICANT HIGH RATE EPISODES. OPTI-VOL WITHIN NORMAL LIMITS. NO PROGRAMMING CHANGES MADE TO DEVICE PARAMETERS. NORMAL DEVICE FUNCTION. NC

## 2025-06-25 ENCOUNTER — TELEPHONE (OUTPATIENT)
Dept: HEMATOLOGY ONCOLOGY | Facility: CLINIC | Age: 75
End: 2025-06-25

## 2025-06-25 NOTE — TELEPHONE ENCOUNTER
Spoke with patient regarding need to reschedule upcoming appointment with Dr. Hanley from 8/5 to 8/8 at 9:40am at the [place] office due to provider availability.  Patient verbalized understanding and was extremely unhappy about needing to reschedule appointment.

## 2025-06-25 NOTE — TELEPHONE ENCOUNTER
Returned call from patient confirming that he indeed is accepting of rescheduled appointment with Dr. Hanley on 8/8 at 9:40am

## 2025-06-27 NOTE — TELEPHONE ENCOUNTER
Orders:    CBC and Auto Differential    Sedimentation Rate    C-Reactive Protein    Sees rheumatology     Cannot get T spot  - its a Friday    Patient called he is still having a sharp pain in his side and lingering cough. He completed a CT scan on 12/5 and saw his PCP to review results. He was advised his Pneumonia had cleared up which was good but still is dealing with the side pain and cough. He was asking if  could please take a look over these results and advise of next steps. Patient has an appointment on 12/17/24 with  but was hoping to be seen sooner. Please advise      Call Back #[phone]

## 2025-06-30 ENCOUNTER — APPOINTMENT (OUTPATIENT)
Dept: LAB | Facility: HOSPITAL | Age: 75
End: 2025-06-30
Payer: MEDICARE

## 2025-06-30 DIAGNOSIS — C91.10 CLL (CHRONIC LYMPHOCYTIC LEUKEMIA) (HCC): ICD-10-CM

## 2025-06-30 LAB
ALBUMIN SERPL BCG-MCNC: 4.5 G/DL (ref 3.5–5)
ALP SERPL-CCNC: 112 U/L (ref 34–104)
ALT SERPL W P-5'-P-CCNC: 52 U/L (ref 7–52)
ANION GAP SERPL CALCULATED.3IONS-SCNC: 6 MMOL/L (ref 4–13)
AST SERPL W P-5'-P-CCNC: 34 U/L (ref 13–39)
BASOPHILS # BLD AUTO: 0.01 THOUSANDS/ÂΜL (ref 0–0.1)
BASOPHILS NFR BLD AUTO: 0 % (ref 0–1)
BILIRUB SERPL-MCNC: 1.09 MG/DL (ref 0.2–1)
BUN SERPL-MCNC: 9 MG/DL (ref 5–25)
CALCIUM SERPL-MCNC: 9 MG/DL (ref 8.4–10.2)
CHLORIDE SERPL-SCNC: 97 MMOL/L (ref 96–108)
CO2 SERPL-SCNC: 31 MMOL/L (ref 21–32)
CREAT SERPL-MCNC: 0.73 MG/DL (ref 0.6–1.3)
EOSINOPHIL # BLD AUTO: 0 THOUSAND/ÂΜL (ref 0–0.61)
EOSINOPHIL NFR BLD AUTO: 0 % (ref 0–6)
ERYTHROCYTE [DISTWIDTH] IN BLOOD BY AUTOMATED COUNT: 15.5 % (ref 11.6–15.1)
GFR SERPL CREATININE-BSD FRML MDRD: 92 ML/MIN/1.73SQ M
GLUCOSE P FAST SERPL-MCNC: 104 MG/DL (ref 65–99)
HCT VFR BLD AUTO: 33.3 % (ref 36.5–49.3)
HGB BLD-MCNC: 11.8 G/DL (ref 12–17)
IMM GRANULOCYTES # BLD AUTO: 0.05 THOUSAND/UL (ref 0–0.2)
IMM GRANULOCYTES NFR BLD AUTO: 1 % (ref 0–2)
LDH SERPL-CCNC: 190 U/L (ref 140–271)
LYMPHOCYTES # BLD AUTO: 0.59 THOUSANDS/ÂΜL (ref 0.6–4.47)
LYMPHOCYTES NFR BLD AUTO: 14 % (ref 14–44)
MCH RBC QN AUTO: 34.1 PG (ref 26.8–34.3)
MCHC RBC AUTO-ENTMCNC: 35.4 G/DL (ref 31.4–37.4)
MCV RBC AUTO: 96 FL (ref 82–98)
MONOCYTES # BLD AUTO: 0.62 THOUSAND/ÂΜL (ref 0.17–1.22)
MONOCYTES NFR BLD AUTO: 15 % (ref 4–12)
NEUTROPHILS # BLD AUTO: 3.01 THOUSANDS/ÂΜL (ref 1.85–7.62)
NEUTS SEG NFR BLD AUTO: 70 % (ref 43–75)
NRBC BLD AUTO-RTO: 0 /100 WBCS
PLATELET # BLD AUTO: 139 THOUSANDS/UL (ref 149–390)
PMV BLD AUTO: 9.3 FL (ref 8.9–12.7)
POTASSIUM SERPL-SCNC: 3.9 MMOL/L (ref 3.5–5.3)
PROT SERPL-MCNC: 7 G/DL (ref 6.4–8.4)
RBC # BLD AUTO: 3.46 MILLION/UL (ref 3.88–5.62)
SODIUM SERPL-SCNC: 134 MMOL/L (ref 135–147)
URATE SERPL-MCNC: 5.9 MG/DL (ref 3.5–8.5)
WBC # BLD AUTO: 4.28 THOUSAND/UL (ref 4.31–10.16)

## 2025-06-30 PROCEDURE — 36415 COLL VENOUS BLD VENIPUNCTURE: CPT

## 2025-06-30 PROCEDURE — 84550 ASSAY OF BLOOD/URIC ACID: CPT

## 2025-06-30 PROCEDURE — 85025 COMPLETE CBC W/AUTO DIFF WBC: CPT

## 2025-06-30 PROCEDURE — 80053 COMPREHEN METABOLIC PANEL: CPT

## 2025-06-30 PROCEDURE — 83615 LACTATE (LD) (LDH) ENZYME: CPT

## 2025-07-08 ENCOUNTER — TELEPHONE (OUTPATIENT)
Dept: GASTROENTEROLOGY | Facility: CLINIC | Age: 75
End: 2025-07-08

## 2025-07-08 ENCOUNTER — TELEPHONE (OUTPATIENT)
Age: 75
End: 2025-07-08

## 2025-07-08 NOTE — TELEPHONE ENCOUNTER
Germania from [facility]'s pharmacy called asking if patient could have a new prescription for catheter supplies.  He is requesting the supply to be for 3 month supply. The order sent from 6/13/25 was for 1 month supply.    Please advise and fax new order to #[phone]

## 2025-07-08 NOTE — TELEPHONE ENCOUNTER
Patient called in regards to his 6 month follow up. I advised patient that he us not due for an appointment until 1022/25 and that  schedule is not available for October as of yet. Patient understood and will call back. Thank You

## 2025-07-09 NOTE — TELEPHONE ENCOUNTER
New script faxed to [facility] for 3 month supply of catheters.  Confirmation received and scanned into the patient's chart.

## 2025-07-30 NOTE — PROGRESS NOTES
Virtual Regular Visit      Assessment/Plan:      He has low-grade fever and chills along with fatigue but no other illness symptoms in the setting of CLL and autoimmune hemolytic anemia  His CBC yesterday showed improvement of the white count and stable hemoglobin of 11 1  We discussed doing another COVID swab, but do not feel this is necessary since he had severe Covid illness in January, then received monoclonal antibody infusion followed by 2 doses of vaccine  His 2nd dose of vaccine was 5/7/21  I recommended continued hydration and light diet along with Tylenol up to the maximum dosing  He needs to call with change in symptoms or high fever  Problem List Items Addressed This Visit     None      Visit Diagnoses     Fever, unspecified fever cause    -  Primary               Reason for visit is   Chief Complaint   Patient presents with    COVID-19     bodyaches, chills, and fatigue, started yesterday        Encounter provider Robyn Chao MD    Provider located at [address] [place] [address]  [phone]      Recent Visits  Date Type Provider Dept   06/18/21 Telephone Amos Berry, [address] [place] [address] Primary Care   Showing recent visits within past 7 days and meeting all other requirements  Today's Visits  Date Type Provider Dept   06/22/21 Razia Tang MD [place] [address] Primary Care   Showing today's visits and meeting all other requirements  Future Appointments  No visits were found meeting these conditions  Showing future appointments within next 150 days and meeting all other requirements       The patient was identified by name and date of birth  China Lux was informed that this is a telemedicine visit and that the visit is being conducted through [address] [place] [address] Now and patient was informed that this is a secure, HIPAA-compliant platform  He agrees to proceed     My office door was closed   No one else was in the room  He acknowledged consent and understanding of privacy and security of the video platform  The patient has agreed to participate and understands they can discontinue the visit at any time  Patient is aware this is a billable service  Subjective  Mario Eduardo is a 78 y o  male    He is having a virtual visit to discuss symptoms  Yesterday he developed fever and chills along with fatigue  No other sx  He was very sick with Covid in January April he was diagnosed with autoimmune hemolytic anemia in addition to  CLL and Hgb dropped to 4 6  He is seeing Dr Roberta Baltazar monthly  Tapering prednisone down to 10 mg daily    Temp was 99 3 this AM   No other illness sx  Taking Tylenol  Staying hydrated with Gatorade Zero  Not hungry but no nausea  No rash    CBC yesterday revealed white count of 28 8 which was improved from 30/6  Also hemoglobin is stable at 11 1  Past Medical History:   Diagnosis Date    Anxiety     BPH (benign prostatic hypertrophy)     Cancer (HCC)     CLL    CLL [facility]chronic lymphocytic leukemia) ([facility] [address] )     2009    Colon polyp     Concussion     Resolved: 08/22/16    Depression     Gastrointestinal hemorrhage     Last assessed: 08/27/13    GERD (gastroesophageal reflux disease)     Hearing loss of aging     Hiatal hernia     History of transfusion     Hyperlipidemia     Hypertension     Iron deficiency anemia     Microscopic hematuria     Last assessed: 06/28/13    OA (osteoarthritis)     right hip    Pneumothorax     Primary osteoarthritis of right hip 9/29/2017    He is status post right hip arthroplasty    Prostatitis     Pulmonary hypertension ([address] )     Seasonal allergies     Urinary tract infection associated with catheterization of urinary tract ([address] ) 2/5/2021    Pseudomonal UTI asymptomatic  Per Urology do not treat at this time      UTI (urinary tract infection)     in past    Wears glasses        Past Surgical History: Procedure Laterality Date    COLONOSCOPY      CYSTOSCOPY  10/09/2014    Diagnostic    CYSTOSCOPY  06/29/2020    FRACTURE SURGERY      left lower arm    FRACTURE SURGERY      left femur    HERNIA REPAIR      JOINT REPLACEMENT Right     hip    OTHER SURGICAL HISTORY  03/2011    Spinal anesthesia epidural    OK TOTAL HIP ARTHROPLASTY Right 9/29/2017    Procedure: ARTHROPLASTY HIP TOTAL ANTERIOR;  Surgeon: Hernandez Perales MD;  Location: [facility];  Service: Orthopedics    TONSILLECTOMY AND ADENOIDECTOMY      TRANSURETHRAL RESECTION OF PROSTATE      x 2    WRIST SURGERY         Current Outpatient Medications   Medication Sig Dispense Refill    amLODIPine (NORVASC) 5 mg tablet TAKE ONE TABLET BY MOUTH EVERY EVENING 90 tablet 3    aspirin 81 MG tablet Take 1 tablet by mouth daily      atorvastatin (LIPITOR) 20 mg tablet TAKE ONE TABLET BY MOUTH ONCE EVERY DAY      folic acid (FOLVITE) 667 mcg tablet Take 1 tablet (400 mcg total) by mouth daily 30 tablet 0    metoprolol tartrate (LOPRESSOR) 25 mg tablet Take 1 tablet (25 mg total) by mouth every 12 (twelve) hours 180 tablet 3    montelukast (SINGULAIR) 10 mg tablet TAKE ONE TABLET BY MOUTH DAILY 90 tablet 3    Multiple Vitamin (multivitamin) tablet Take 1 tablet by mouth daily 90 tablet 3    Omega-3 Fatty Acids (FISH OIL) 1,000 mg Take by mouth daily      omeprazole (PriLOSEC) 40 MG capsule TAKE ONE CAPSULE BY MOUTH ONCE EVERY DAY 90 capsule 3    senna-docusate sodium (SENOKOT S) 8 6-50 mg per tablet Take 1 tablet by mouth daily at bedtime 30 tablet 0    sertraline (ZOLOFT) 50 mg tablet TAKE ONE TABLET BY MOUTH EVERY DAY 90 tablet 3    tamsulosin (FLOMAX) 0 4 mg Take 1 capsule (0 4 mg total) by mouth daily with dinner CORRECTED SCRIPT! Note change in SIG and QUANTITY  Please process accordingly    Thank you! (9/1/20) 90 capsule 1    vitamin B-12 (CYANOCOBALAMIN) 500 MCG TABS Take 1 tablet (500 mcg total) by mouth daily 30 tablet 0     No current facility-administered medications for this visit  Allergies   Allergen Reactions    Codeine Other (See Comments)     agitated    Sulfamethoxazole-Trimethoprim GI Intolerance and Abdominal Pain     upset stomach       Review of Systems   Constitutional: Positive for chills, fatigue and fever  Negative for activity change  HENT: Negative for congestion  Respiratory: Negative for cough  Cardiovascular: Negative for chest pain  Gastrointestinal: Negative for abdominal pain, diarrhea, nausea and vomiting  Musculoskeletal: Negative for arthralgias  Neurological: Negative for dizziness and headaches  Psychiatric/Behavioral: Negative for dysphoric mood  The patient is not nervous/anxious  Video Exam    Vitals:    06/22/21 1317   BP: 159/83   Pulse: 66   Temp: 99 3 °F (37 4 °C)   TempSrc: Tympanic   SpO2: 93%       Physical Exam  Constitutional:       Appearance: He is obese  HENT:      Head: Normocephalic and atraumatic  Eyes:      General: No scleral icterus  Pulmonary:      Effort: No respiratory distress  Neurological:      Mental Status: He is alert  Psychiatric:         Mood and Affect: Mood normal          Behavior: Behavior normal           I spent 17 minutes directly with the patient during this visit      VIRTUAL VISIT [address] acknowledges that he has consented to an online visit or consultation  He understands that the online visit is based solely on information provided by him, and that, in the absence of a face-to-face physical evaluation by the physician, the diagnosis he receives is both limited and provisional in terms of accuracy and completeness  This is not intended to replace a full medical face-to-face evaluation by the physician  Helene Vyas understands and accepts these terms  Attending Attestation (For Attendings USE Only)...

## 2025-08-01 ENCOUNTER — TELEPHONE (OUTPATIENT)
Age: 75
End: 2025-08-01

## 2025-08-04 ENCOUNTER — APPOINTMENT (OUTPATIENT)
Dept: LAB | Facility: CLINIC | Age: 75
End: 2025-08-04
Payer: MEDICARE

## 2025-08-04 DIAGNOSIS — C91.10 CLL (CHRONIC LYMPHOCYTIC LEUKEMIA) (HCC): ICD-10-CM

## 2025-08-04 LAB
ALBUMIN SERPL BCG-MCNC: 4.7 G/DL (ref 3.5–5)
ALP SERPL-CCNC: 113 U/L (ref 34–104)
ALT SERPL W P-5'-P-CCNC: 78 U/L (ref 7–52)
ANION GAP SERPL CALCULATED.3IONS-SCNC: 9 MMOL/L (ref 4–13)
AST SERPL W P-5'-P-CCNC: 54 U/L (ref 13–39)
BASOPHILS # BLD AUTO: 0.01 THOUSANDS/ÂΜL (ref 0–0.1)
BASOPHILS NFR BLD AUTO: 0 % (ref 0–1)
BILIRUB SERPL-MCNC: 1.13 MG/DL (ref 0.2–1)
BUN SERPL-MCNC: 13 MG/DL (ref 5–25)
CALCIUM SERPL-MCNC: 9.3 MG/DL (ref 8.4–10.2)
CHLORIDE SERPL-SCNC: 96 MMOL/L (ref 96–108)
CO2 SERPL-SCNC: 30 MMOL/L (ref 21–32)
CREAT SERPL-MCNC: 0.75 MG/DL (ref 0.6–1.3)
EOSINOPHIL # BLD AUTO: 0 THOUSAND/ÂΜL (ref 0–0.61)
EOSINOPHIL NFR BLD AUTO: 0 % (ref 0–6)
ERYTHROCYTE [DISTWIDTH] IN BLOOD BY AUTOMATED COUNT: 15.9 % (ref 11.6–15.1)
GFR SERPL CREATININE-BSD FRML MDRD: 91 ML/MIN/1.73SQ M
GLUCOSE P FAST SERPL-MCNC: 107 MG/DL (ref 65–99)
HCT VFR BLD AUTO: 36.3 % (ref 36.5–49.3)
HGB BLD-MCNC: 12.4 G/DL (ref 12–17)
IGG SERPL-MCNC: 793 MG/DL (ref 635–1741)
IMM GRANULOCYTES # BLD AUTO: 0.04 THOUSAND/UL (ref 0–0.2)
IMM GRANULOCYTES NFR BLD AUTO: 1 % (ref 0–2)
LDH SERPL-CCNC: 257 U/L (ref 140–271)
LYMPHOCYTES # BLD AUTO: 0.65 THOUSANDS/ÂΜL (ref 0.6–4.47)
LYMPHOCYTES NFR BLD AUTO: 12 % (ref 14–44)
MCH RBC QN AUTO: 35.1 PG (ref 26.8–34.3)
MCHC RBC AUTO-ENTMCNC: 34.2 G/DL (ref 31.4–37.4)
MCV RBC AUTO: 103 FL (ref 82–98)
MONOCYTES # BLD AUTO: 0.88 THOUSAND/ÂΜL (ref 0.17–1.22)
MONOCYTES NFR BLD AUTO: 16 % (ref 4–12)
NEUTROPHILS # BLD AUTO: 4.03 THOUSANDS/ÂΜL (ref 1.85–7.62)
NEUTS SEG NFR BLD AUTO: 71 % (ref 43–75)
NRBC BLD AUTO-RTO: 0 /100 WBCS
PLATELET # BLD AUTO: 176 THOUSANDS/UL (ref 149–390)
PMV BLD AUTO: 10.1 FL (ref 8.9–12.7)
POTASSIUM SERPL-SCNC: 4.2 MMOL/L (ref 3.5–5.3)
PROT SERPL-MCNC: 7.3 G/DL (ref 6.4–8.4)
RBC # BLD AUTO: 3.53 MILLION/UL (ref 3.88–5.62)
SODIUM SERPL-SCNC: 135 MMOL/L (ref 135–147)
URATE SERPL-MCNC: 7 MG/DL (ref 3.5–8.5)
WBC # BLD AUTO: 5.61 THOUSAND/UL (ref 4.31–10.16)

## 2025-08-04 PROCEDURE — 83615 LACTATE (LD) (LDH) ENZYME: CPT

## 2025-08-04 PROCEDURE — 84550 ASSAY OF BLOOD/URIC ACID: CPT

## 2025-08-04 PROCEDURE — 80053 COMPREHEN METABOLIC PANEL: CPT

## 2025-08-04 PROCEDURE — 85025 COMPLETE CBC W/AUTO DIFF WBC: CPT

## 2025-08-04 PROCEDURE — 82784 ASSAY IGA/IGD/IGG/IGM EACH: CPT

## 2025-08-04 PROCEDURE — 36415 COLL VENOUS BLD VENIPUNCTURE: CPT

## 2025-08-05 ENCOUNTER — TELEPHONE (OUTPATIENT)
Age: 75
End: 2025-08-05

## 2025-08-06 ENCOUNTER — TELEPHONE (OUTPATIENT)
Dept: UROLOGY | Facility: CLINIC | Age: 75
End: 2025-08-06

## 2025-08-06 DIAGNOSIS — F41.8 SITUATIONAL ANXIETY: ICD-10-CM

## 2025-08-06 DIAGNOSIS — E78.00 PURE HYPERCHOLESTEROLEMIA, UNSPECIFIED: ICD-10-CM

## 2025-08-06 DIAGNOSIS — F51.01 PRIMARY INSOMNIA: ICD-10-CM

## 2025-08-06 RX ORDER — ATORVASTATIN CALCIUM 20 MG/1
20 TABLET, FILM COATED ORAL DAILY
Qty: 100 TABLET | Refills: 1 | Status: SHIPPED | OUTPATIENT
Start: 2025-08-06 | End: 2025-08-11 | Stop reason: SDUPTHER

## 2025-08-07 ENCOUNTER — OFFICE VISIT (OUTPATIENT)
Dept: UROLOGY | Facility: CLINIC | Age: 75
End: 2025-08-07
Payer: MEDICARE

## 2025-08-07 VITALS
BODY MASS INDEX: 30.61 KG/M2 | HEIGHT: 67 IN | WEIGHT: 195 LBS | DIASTOLIC BLOOD PRESSURE: 80 MMHG | SYSTOLIC BLOOD PRESSURE: 140 MMHG | OXYGEN SATURATION: 96 % | HEART RATE: 77 BPM

## 2025-08-07 DIAGNOSIS — R33.9 URINARY RETENTION: Primary | ICD-10-CM

## 2025-08-07 LAB — POST-VOID RESIDUAL VOLUME, ML POC: 70 ML

## 2025-08-07 PROCEDURE — 51798 US URINE CAPACITY MEASURE: CPT

## 2025-08-07 PROCEDURE — 99213 OFFICE O/P EST LOW 20 MIN: CPT

## 2025-08-07 RX ORDER — HYDROCHLOROTHIAZIDE 12.5 MG/1
1 TABLET ORAL DAILY
COMMUNITY
Start: 2025-05-27

## 2025-08-08 ENCOUNTER — OFFICE VISIT (OUTPATIENT)
Dept: HEMATOLOGY ONCOLOGY | Facility: CLINIC | Age: 75
End: 2025-08-08
Payer: MEDICARE

## 2025-08-08 RX ORDER — LORAZEPAM 0.5 MG/1
0.5 TABLET ORAL DAILY PRN
Qty: 30 TABLET | Refills: 0 | Status: SHIPPED | OUTPATIENT
Start: 2025-08-08 | End: 2025-08-11 | Stop reason: SDUPTHER

## 2025-08-08 RX ORDER — ZOLPIDEM TARTRATE 5 MG/1
TABLET ORAL
Qty: 90 TABLET | Refills: 0 | Status: SHIPPED | OUTPATIENT
Start: 2025-08-08 | End: 2025-08-11 | Stop reason: SDUPTHER

## 2025-08-11 ENCOUNTER — TELEPHONE (OUTPATIENT)
Age: 75
End: 2025-08-11

## (undated) DEVICE — HOOD: Brand: FLYTE

## (undated) DEVICE — HEX-LOCKING BLADE ELECTRODE: Brand: EDGE

## (undated) DEVICE — REM POLYHESIVE ADULT PATIENT RETURN ELECTRODE: Brand: VALLEYLAB

## (undated) DEVICE — INSTRUMENT POUCH: Brand: CONVERTORS

## (undated) DEVICE — GAMMEX® NON-LATEX SENSITIVE SIZE 7.5, STERILE NEOPRENE POWDER-FREE SURGICAL GLOVE: Brand: GAMMEX

## (undated) DEVICE — GLIDESHEATH SLENDER STAINLESS STEEL KIT: Brand: GLIDESHEATH SLENDER

## (undated) DEVICE — AVITENE MICROFIBRILLAR COLLAGEN HEMOSTAT NON-WOVEN WEB: Brand: AVITENE NON-WOVEN WEB

## (undated) DEVICE — TIBURON SPLIT SHEET: Brand: CONVERTORS

## (undated) DEVICE — INTRO SHEATH PEEL AWAY 7FR

## (undated) DEVICE — TR BAND RADIAL ARTERY COMPRESSION DEVICE: Brand: TR BAND

## (undated) DEVICE — STRL ALLENTOWN HIP SHOULDER PK: Brand: CARDINAL HEALTH

## (undated) DEVICE — INTENDED FOR TISSUE SEPARATION, AND OTHER PROCEDURES THAT REQUIRE A SHARP SURGICAL BLADE TO PUNCTURE OR CUT.: Brand: BARD-PARKER ® CARBON RIB-BACK BLADES

## (undated) DEVICE — SYRINGE 30ML LL

## (undated) DEVICE — SILVER-COATED ANTIMICROBIAL BARRIER DRESSING: Brand: ACTICOAT FLEX7 4" X 5"

## (undated) DEVICE — SLITTER ADJUSTABLE

## (undated) DEVICE — GUIDEWIRE WHOLEY HI TORQUE INTERM MOD J .035 145CM

## (undated) DEVICE — GLOVE SRG BIOGEL 8.5

## (undated) DEVICE — ELECTRODE ELECTROSURGICAL EDGE PTFE STANDARD L6.5 IN OD3/32 TEFLON COATED

## (undated) DEVICE — SAW BLADE OSCILLATING BRAZOL 167

## (undated) DEVICE — GLOVE INDICATOR PI UNDERGLOVE SZ 8.5 BLUE

## (undated) DEVICE — RADIFOCUS OPTITORQUE ANGIOGRAPHIC CATHETER: Brand: OPTITORQUE

## (undated) DEVICE — DRAPE C-ARM X-RAY

## (undated) DEVICE — INTRO SHEATH PEEL AWAY 9 FR

## (undated) DEVICE — PROXIMATE SKIN STAPLERS (35 WIDE) CONTAINS 35 STAINLESS STEEL STAPLES (FIXED HEAD): Brand: PROXIMATE

## (undated) DEVICE — VEST SURGEON DISPOSABLE

## (undated) DEVICE — 4.0 MM X 2.35 MM X 70.0 MM OVAL CARBIDE BUR, 8 FLUTE

## (undated) DEVICE — GUIDE SHEATH 7FR 90 D ATTAIN SELECT II SUREVALVE

## (undated) DEVICE — VIOLET BRAIDED (POLYGLACTIN 910), SYNTHETIC ABSORBABLE SUTURE: Brand: COATED VICRYL

## (undated) DEVICE — RUNTHROUGH NS EXTRA FLOPPY PTCA GUIDEWIRE: Brand: RUNTHROUGH

## (undated) DEVICE — SCD SEQUENTIAL COMPRESSION COMFORT SLEEVE MEDIUM KNEE LENGTH: Brand: KENDALL SCD

## (undated) DEVICE — DRAPE SHEET X-LG

## (undated) DEVICE — MAYO STAND COVER: Brand: CONVERTORS

## (undated) DEVICE — LEAD 459888 MRI S-TIP US
Type: IMPLANTABLE DEVICE | Site: HEART | Status: NON-FUNCTIONAL
Brand: ATTAIN PERFORMA™ S MRI SURESCAN™
Removed: 2023-03-08

## (undated) DEVICE — GUIDE SHEATH 9FR ATTAIN COMMAND 9FR EH CURVE

## (undated) DEVICE — CATH DIAG 5FR IMPULSE 100CM FR4

## (undated) DEVICE — WEBRIL 6 IN UNSTERILE

## (undated) DEVICE — NEEDLE 18 G X 1 1/2

## (undated) DEVICE — ARTHROSCOPY FLOOR MAT

## (undated) DEVICE — 10FR FRAZIER SUCTION HANDLE: Brand: CARDINAL HEALTH

## (undated) DEVICE — ANGIO-SEAL VIP VASCULAR CLOSURE DEVICE
Type: IMPLANTABLE DEVICE | Status: NON-FUNCTIONAL
Brand: ANGIO-SEAL

## (undated) DEVICE — SUT VICRYL 2-0 CT-1 36 IN J945H

## (undated) DEVICE — PINNACLE INTRODUCER SHEATH: Brand: PINNACLE

## (undated) DEVICE — MICROPUNCTURE INTRODUCER SET SILHOUETTE TRANSITIONLESS PUSH-PLUS DESIGN WITH NITINOL WIRE GUIDE: Brand: MICROPUNCTURE

## (undated) DEVICE — Device

## (undated) DEVICE — Device: Brand: WEBSTER

## (undated) DEVICE — GLOVE SRG BIOGEL ORTHOPEDIC 7.5

## (undated) DEVICE — INTENDED FOR TISSUE SEPARATION, AND OTHER PROCEDURES THAT REQUIRE A SHARP SURGICAL BLADE TO PUNCTURE OR CUT.: Brand: BARD-PARKER SAFETY BLADES SIZE 10, STERILE

## (undated) DEVICE — CHLORAPREP HI-LITE 26ML ORANGE

## (undated) DEVICE — 3M™ TEGADERM™ TRANSPARENT FILM DRESSING FRAME STYLE, 1627, 4 IN X 10 IN (10 CM X 25 CM), 20/CT 4CT/CASE: Brand: 3M™ TEGADERM™

## (undated) DEVICE — ADHESIVE SKN CLSR HISTOACRYL FLEX 0.5ML LF

## (undated) DEVICE — GAUZE SPONGES,16 PLY: Brand: CURITY

## (undated) DEVICE — SWAN-GANZ BIPOLAR PACING CATHETER: Brand: SWAN-GANZ

## (undated) DEVICE — DGW .035 FC J3MM 150CM TEF: Brand: EMERALD

## (undated) DEVICE — POSITIONER HANA TABLE PACK

## (undated) DEVICE — GLOVE INDICATOR PI UNDERGLOVE SZ 8 BLUE

## (undated) DEVICE — 3000CC GUARDIAN II: Brand: GUARDIAN

## (undated) DEVICE — SKIN MARKER DUAL TIP WITH RULER CAP, FLEXIBLE RULER AND LABELS: Brand: DEVON

## (undated) DEVICE — COBAN 6 IN STERILE

## (undated) DEVICE — BIPOLAR SEALER 23-113-1 AQM 2.3: Brand: AQUAMANTYS™

## (undated) DEVICE — CAPIT HIP COP -CERAMIC ON POLY

## (undated) DEVICE — HEAVY DUTY TABLE COVER: Brand: CONVERTORS

## (undated) DEVICE — GLOVE SRG BIOGEL 7.5

## (undated) DEVICE — SURGICAL GOWN, XL SMARTSLEEVE: Brand: CONVERTORS

## (undated) DEVICE — WET SKIN PREP TRAY: Brand: MEDLINE INDUSTRIES, INC.

## (undated) DEVICE — CATH DIAG 5FR IMPULSE 100CM FL4

## (undated) DEVICE — TRAY FOLEY 16FR URIMETER SURESTEP

## (undated) DEVICE — SWAN-GANZ DOUBLE LUMEN MONITORING CATHETER: Brand: SWAN-GANZ

## (undated) DEVICE — CATH GUIDING FIXED SHAPE 43CM

## (undated) DEVICE — THE SIMPULSE SOLO SYSTEM WITH ULTREX RETRACTABLE SPLASH SHIELD TIP: Brand: SIMPULSE SOLO

## (undated) DEVICE — PREP SURGICAL PURPREP 26ML

## (undated) DEVICE — GLOVE SRG BIOGEL 8

## (undated) DEVICE — SMARTGOWN SURGICAL GOWN, 3XL, LONG: Brand: CONVERTORS